# Patient Record
Sex: MALE | Race: WHITE | NOT HISPANIC OR LATINO | Employment: OTHER | URBAN - METROPOLITAN AREA
[De-identification: names, ages, dates, MRNs, and addresses within clinical notes are randomized per-mention and may not be internally consistent; named-entity substitution may affect disease eponyms.]

---

## 2017-01-03 ENCOUNTER — MYC MEDICAL ADVICE (OUTPATIENT)
Dept: OTOLARYNGOLOGY | Facility: CLINIC | Age: 57
End: 2017-01-03

## 2017-01-03 DIAGNOSIS — J32.9 CHRONIC SINUSITIS, UNSPECIFIED LOCATION: Primary | ICD-10-CM

## 2017-01-04 RX ORDER — CLINDAMYCIN HCL 150 MG
CAPSULE ORAL
Qty: 10 CAPSULE | Refills: 3 | Status: SHIPPED | OUTPATIENT
Start: 2017-01-04 | End: 2017-04-19

## 2017-01-06 ENCOUNTER — MYC MEDICAL ADVICE (OUTPATIENT)
Dept: FAMILY MEDICINE | Facility: CLINIC | Age: 57
End: 2017-01-06

## 2017-01-06 NOTE — TELEPHONE ENCOUNTER
Routing to PCP.    Dr. Wegener-Please review and may close encounter.    Thank you!  FLORIN AvalosN, RN

## 2017-01-23 ENCOUNTER — TELEPHONE (OUTPATIENT)
Dept: FAMILY MEDICINE | Facility: CLINIC | Age: 57
End: 2017-01-23

## 2017-01-23 ENCOUNTER — MYC MEDICAL ADVICE (OUTPATIENT)
Dept: FAMILY MEDICINE | Facility: CLINIC | Age: 57
End: 2017-01-23

## 2017-01-23 ENCOUNTER — OFFICE VISIT (OUTPATIENT)
Dept: FAMILY MEDICINE | Facility: CLINIC | Age: 57
End: 2017-01-23
Payer: MEDICARE

## 2017-01-23 VITALS
WEIGHT: 222 LBS | DIASTOLIC BLOOD PRESSURE: 78 MMHG | BODY MASS INDEX: 31.78 KG/M2 | TEMPERATURE: 98.7 F | OXYGEN SATURATION: 98 % | HEART RATE: 59 BPM | HEIGHT: 70 IN | RESPIRATION RATE: 12 BRPM | SYSTOLIC BLOOD PRESSURE: 159 MMHG

## 2017-01-23 DIAGNOSIS — F41.1 GENERALIZED ANXIETY DISORDER: ICD-10-CM

## 2017-01-23 DIAGNOSIS — M62.838 MUSCLE SPASMS OF BOTH LOWER EXTREMITIES: ICD-10-CM

## 2017-01-23 DIAGNOSIS — R11.0 NAUSEA WITHOUT VOMITING: ICD-10-CM

## 2017-01-23 DIAGNOSIS — Z79.4 TYPE 2 DIABETES MELLITUS WITHOUT COMPLICATION, WITH LONG-TERM CURRENT USE OF INSULIN (H): Primary | ICD-10-CM

## 2017-01-23 DIAGNOSIS — I10 HYPERTENSION GOAL BP (BLOOD PRESSURE) < 140/90: Primary | ICD-10-CM

## 2017-01-23 DIAGNOSIS — L89.92 PRESSURE ULCER, STAGE II (H): ICD-10-CM

## 2017-01-23 DIAGNOSIS — E11.9 TYPE 2 DIABETES MELLITUS WITHOUT COMPLICATION, WITH LONG-TERM CURRENT USE OF INSULIN (H): Primary | ICD-10-CM

## 2017-01-23 PROCEDURE — 99214 OFFICE O/P EST MOD 30 MIN: CPT | Performed by: FAMILY MEDICINE

## 2017-01-23 RX ORDER — SERTRALINE HYDROCHLORIDE 100 MG/1
200 TABLET, FILM COATED ORAL DAILY
Qty: 180 TABLET | Refills: 0 | Status: SHIPPED | OUTPATIENT
Start: 2017-01-23 | End: 2017-01-24

## 2017-01-23 RX ORDER — LORAZEPAM 0.5 MG/1
TABLET ORAL
Qty: 30 TABLET | Refills: 5 | Status: SHIPPED | OUTPATIENT
Start: 2017-01-23 | End: 2017-06-14

## 2017-01-23 RX ORDER — PROMETHAZINE HYDROCHLORIDE 25 MG/1
25 TABLET ORAL EVERY 6 HOURS PRN
Qty: 40 TABLET | Refills: 11 | Status: SHIPPED | OUTPATIENT
Start: 2017-01-23 | End: 2017-01-24

## 2017-01-23 NOTE — PATIENT INSTRUCTIONS
ASSESSMENT AND PLAN  1. Type 2 diabetes mellitus without complication, with long-term current use of insulin (H)  Decrease lantus to 28 units daily since a1c very low.       - insulin glargine (LANTUS SOLOSTAR) 100 UNIT/ML injection; Inject 28 units under the skin daily.  Dispense: 15 mL; Refill: 11    2. Muscle spasms of both lower extremities  Add lorazepam 1-2 tablets daily as needed to baclofen.     - LORazepam (ATIVAN) 0.5 MG tablet; 1-2 daily for muscle spasm as needed. #30/month  Dispense: 30 tablet; Refill: 5    3. Pressure ulcer, stage II  Referral given for additional suggestions.  At least stable and does not appear to be infected.     - WOUND CARE REFERRAL        MYCHART SIGNUP FOR E-VISITS AND EASIER COMMUNICATION:  [email or web address]     Zipnosis:  [email or web address]  Sign up for e-visits for common illnesses!     RADIOLOGY:   [facility]:  [phone] to schedule any radiology tests at [facility]: [phone]    Mammograms/colonoscopies:  [phone]    CONSUMER PRICE LINE for estimates of test costs:  [phone]

## 2017-01-23 NOTE — PROGRESS NOTES
SUBJECTIVE:                                                    Erwin Aguilar is a 56 year old male who presents to clinic today for the following health issues:      Rash/Pressure Ulcer      Duration: over a year and a half    Description  Location: edge of the butt crack/anus  Itching: no    Intensity:  Does not feel pain below the waist    Accompanying signs and symptoms: bleeding, sensitive     History (similar episodes/previous evaluation): None    Precipitating or alleviating factors:  New exposures:  None  Recent travel: no      Therapies tried and outcome: dawna honey, effective           Additional history/life update:     Pressure ulcer, stage II:  Located on right buttock near anus, still present. Not painful because patient lacks sensation below the waist 2/2 complication of back surgery in the past. He has noted bleeding and oozing from the ulcer. Cleans ulcer daily with alcohol or saline and applies Medihoney. Has not been able to use a foam donut to protect it due to the location of the ulcer.     Type 2 diabetes mellitus without complication, with long-term use of insulin (H):  Patient reports that blood glucose levels have dropped intermittently with these events being associated with fatigue. He would like to lower his dose of Lantus as his A1C is currently low.     Muscle spasms of both lower extremities: patient endorses worsening muscle spasms despite his large dose of baclofen. He is concerned that he is becoming desensitized to the baclofen and would like alternative treatments to address this. Muscles spasms occur randomly and have been waking him up at night which is significantly impacting his sleep.    Social History: Endorses stress with finances and recent changes in medical insurance. Is also experiencing stress due to medical illness in his family. He reports that he is trying to remain upbeat about these recent life experiences.       Problem list, Medication list, Allergies, and  Medical/Social/Surgical histories reviewed in [facility] and updated as appropriate.  Labs reviewed in EPIC  BP Readings from Last 3 Encounters:   01/23/17 159/78   12/19/16 132/70   10/10/16 130/70    Wt Readings from Last 3 Encounters:   01/23/17 222 lb (100.699 kg)   12/19/16 225 lb (102.059 kg)   10/10/16 226 lb (102.513 kg)                  Patient Active Problem List   Diagnosis     Generalized anxiety disorder     Type 2 diabetes, HbA1c goal < 7% (H)     Hyperlipidemia LDL goal <100     Hypertension goal BP (blood pressure) < 140/90     Major depressive disorder, recurrent episode, mild (H)     Chronic pain syndrome     GERD (gastroesophageal reflux disease)     Abnormal liver function tests     Health Care Home     Abnormal EMG     Hernia     Prolonged QT interval     Diastasis recti     Hypokalemia     Cauda equina syndrome with neurogenic bladder (H)     Wound infection     Cellulitis     Nausea without vomiting     Atopic dermatitis     Muscle spasms of Upper extremity     Edema of left lower extremity     Esophageal varices in cirrhosis (H)     Cirrhosis of liver (H)     Anemia due to blood loss, acute     Skin infection     Superficial thrombophlebitis of arm     Cellulitis of hand     Ganglion cyst     Dry skin dermatitis     Moderate persistent asthma     Open wound of right heel     Fall     Closed fracture of right patella     Right knee pain     Alcoholic cirrhosis of liver without ascites (H)     Lumbosacral radiculopathy     Calculus of gallbladder without cholecystitis without obstruction     Type 2 diabetes mellitus without complication, with long-term current use of insulin (H)     Past Surgical History   Procedure Laterality Date     Surgical history of -   1/02     abcess tooth     Surgical history of -   1999     L4-5 laminectomy, cauda equina syndrome     C appendectomy  1974     Hcl squamous cell carcinoma ag  10/07     left forearm     Surgical history of -   +     herniated disk repair      Neuro stimulator  2010     Herniorrhaphy umbilical  11/8/2012     Procedure: HERNIORRHAPHY UMBILICAL;  Open Umbilical Hernia Repair With Mesh ;  Surgeon: Chase Nicholson MD;  Location: [facility] OR     Endoscopy upper, colonoscopy, combined  10/19/2011     Procedure:COMBINED ENDOSCOPY UPPER, COLONOSCOPY; Upper Endoscopy, Colonoscopy with Polypectomy x4; Surgeon:AMBAR RODRÍGUEZ; Location:[facility] OR     Transposition ulnar nerve (elbow)       right     Esophagoscopy, gastroscopy, duodenoscopy (egd), combined  3/28/2014     Procedure: COMBINED ESOPHAGOSCOPY, GASTROSCOPY, DUODENOSCOPY (EGD);  EGD, Gastric Biopsies, Esophageal Banding;  Surgeon: Reyna Tovar MD;  Location: [facility] OR     Esophagoscopy, gastroscopy, duodenoscopy (egd), combined  6/9/2014     Procedure: COMBINED ESOPHAGOSCOPY, GASTROSCOPY, DUODENOSCOPY (EGD);  Surgeon: Curtis Mendez MD;  Location: [facility] GI     Esophagoscopy, gastroscopy, duodenoscopy (egd), combined  7/24/2014     Procedure: COMBINED ESOPHAGOSCOPY, GASTROSCOPY, DUODENOSCOPY (EGD);  Surgeon: Gerard Samaniego MD;  Location: [facility] OR     Esophagoscopy, gastroscopy, duodenoscopy (egd), combined N/A 10/31/2014     Procedure: COMBINED ESOPHAGOSCOPY, GASTROSCOPY, DUODENOSCOPY (EGD);  Surgeon: Gerard Samaniego MD;  Location: [facility] OR     Colonoscopy N/A 5/12/2016     Procedure: COMBINED COLONOSCOPY, SINGLE OR MULTIPLE BIOPSY/POLYPECTOMY BY BIOPSY;  Surgeon: Ana Paula Urbina MD;  Location: [facility] GI     Esophagoscopy, gastroscopy, duodenoscopy (egd), combined N/A 5/12/2016     Procedure: COMBINED ESOPHAGOSCOPY, GASTROSCOPY, DUODENOSCOPY (EGD);  Surgeon: Ana Paula Urbina MD;  Location: [facility] GI     Tonsillectomy  10 1964     Ent surgery  1-2016     Ongoing since then       Social History   Substance Use Topics     Smoking status: Former Smoker -- 0.50 packs/day for 1 years     Types: Cigarettes     Start date: 10/13/1983     Quit date: 06/09/1984     Smokeless tobacco: Never  Used     Alcohol Use: No      Comment: a pint of alcohol / day - last drink was 3/28/14     Family History   Problem Relation Age of Onset     DIABETES Brother      amputation, Type 1     Cancer - colorectal No family hx of      CANCER Mother      lung     CANCER Father      esophogeal, GERD     DIABETES Brother          Current Outpatient Prescriptions   Medication Sig Dispense Refill     LORazepam (ATIVAN) 0.5 MG tablet 1-2 daily for muscle spasm as needed. #30/month 30 tablet 5     insulin glargine (LANTUS SOLOSTAR) 100 UNIT/ML injection Inject 28 units under the skin daily. 15 mL 11     clindamycin (CLEOCIN) 150 MG capsule Open 1 cap and place in 1 litre of normal saline & mix. Irrigate with 20cc each nostril QID. 10 capsule 3     hydrochlorothiazide (HYDRODIURIL) 25 MG tablet Take 1 tablet (25 mg) by mouth daily 30 tablet 0     order for DME Equipment being ordered: one donut for sitting 1 Device 0     magnesium oxide (MAG-OX) 400 (241.3 MG) MG tablet Take 1 tablet (400 mg) by mouth daily 90 tablet 3     promethazine (PHENERGAN) 25 MG tablet Take 1 tablet (25 mg) by mouth every 6 hours as needed for nausea 40 tablet 3     clindamycin (CLEOCIN) 150 MG capsule TK 1 C PO QID TILL GONE  0     sodium chloride 0.9%, bottle, 0.9 % irrigation IRRIGATE 20 ML  IN EACH NOSTRIL QID  3     clindamycin (CLEOCIN) 150 MG capsule Open one capsule and place in 1 Litre of Normal Saline and mix. Irrigate with 20 cc each nostril QID. 10 capsule 3     sodium chloride 0.9 % SOLN Irrigate 20 cc each nostril QID X 2 month supply 50595 mL 3     propranolol (INDERAL) 20 MG tablet Take 1 tablet (20 mg) by mouth 2 times daily 180 tablet 3     blood glucose monitoring (NO BRAND SPECIFIED) test strip Use to test blood sugars one time daily or as directed    Accucheck Richards plus test strips 1 Box 5     albuterol (PROAIR HFA, PROVENTIL HFA, VENTOLIN HFA) 108 (90 BASE) MCG/ACT inhaler Inhale 2 puffs into the lungs every 6 hours as needed for  shortness of breath / dyspnea or wheezing Ventolin or other covered brand 3 Inhaler 3     insulin pen needle (BD ROSE U/F) 32G X 4 MM Use one daily as directed.    Bd ultra fine pen needles rose 90 each 3     Syringe, Disposable, 25 ML MISC Syringe to be used for nasal irrigation 1 each 3     sodium chloride 0.9 % SOLN Irrigate 20 cc each nostril QID X 2 month supply 26555 mL 3     baclofen (LIORESAL) 10 MG tablet Take 2 tablets (20 mg) by mouth 3 times daily 180 tablet 12     potassium chloride SA (POTASSIUM CHLORIDE) 20 MEQ tablet Take 3 tablets (60 mEq) by mouth daily 180 tablet 3     omeprazole (PRILOSEC) 40 MG capsule Take 1 capsule (40 mg) by mouth daily Take 30-60 minutes before a meal. 90 capsule 3     sertraline (ZOLOFT) 100 MG tablet Take 2 tablets (200 mg) by mouth daily 180 tablet 3     Alcohol Swabs (ALCOHOL PADS) 70 % PADS 1 Box 4 times daily 100 each 3     triamcinolone (KENALOG) 0.1 % ointment Apply sparingly to affected area three times daily for 14 days. 30 g 3     COMPRESSION STOCKINGS Knee high compression socks 30-40-mm 2 each 1     blood glucose monitoring (FREESTYLE) lancets Use to test blood sugars 2 times daily or as directed. 1 Box 3     Blood Glucose Monitoring Suppl (ACURA BLOOD GLUCOSE METER) W/DEVICE KIT 1 Dose 2 times daily 1 kit 1     ORDER FOR DME Equipment being ordered: BP cuff 1 Device 0     ACE/ARB NOT PRESCRIBED, INTENTIONAL, ACE & ARB not prescribed due to Allergy       [DISCONTINUED] insulin glargine (LANTUS SOLOSTAR) 100 UNIT/ML PEN Inject 32 units under the skin daily. 15 mL 11     Allergies   Allergen Reactions     Lisinopril Anaphylaxis     Swollen tongue; inability to swallow; drooling; hives; swollen face, neck, angioedema     Acetaminophen      Hx of cirrhosis      Amlodipine      Swelling, hives, possible angioedema       Morphine      Hypotension     Bactrim [Sulfamethoxazole W/Trimethoprim] Rash     Levaquin Swelling and Rash     Swelling in lip and tongue felt thick  "    Recent Labs   Lab Test  10/10/16   0907  10/10/16   0800  08/23/16   0652   05/10/16   0843   12/11/15   0854   10/05/15   0923  08/04/15   0750   07/03/15   0910   02/06/15   0955   04/26/13   1127   A1C  5.5   --    --    --   6.4*   --    --    --    --   5.7   --    --    --   5.8   < >   --    LDL   --    --    --    --    --    --   123*   --    --    --    --   166*   --   160*   < >   --    HDL   --    --    --    --    --    --   42   --    --    --    --   33*   --   35*   < >   --    TRIG   --    --    --    --    --    --   74   --    --    --    --   105   --   98   < >   --    ALT   --   20  20   --   30   < >  28   < >  24   --    < >  27   < >   --    < >   --    CR  0.75  0.76  0.76   < >  0.67   < >   --    < >  0.78   --    < >  0.67   < >   --    < >   --    GFRESTIMATED  >90  Non  GFR Calc    >90  Non  GFR Calc    >90  Non  GFR Calc     < >  >90  Non  GFR Calc     < >   --    < >  >90  Non  GFR Calc     --    < >  >90  Non  GFR Calc     < >   --    < >   --    GFRESTBLACK  >90   GFR Calc    >90   GFR Calc    >90   GFR Calc     < >  >90   GFR Calc     < >   --    < >  >90   GFR Calc     --    < >  >90   GFR Calc     < >   --    < >   --    POTASSIUM  3.5  3.5  3.1*   < >  3.2*   < >  3.9   < >  3.3*   --    < >  3.2*   < >   --    < >   --    TSH   --    --    --    --    --    --    --    --   1.08   --    --    --    --    --    --   0.69    < > = values in this interval not displayed.        REVIEW OF SYSTEMS FOR GENERAL, RESPIRATORY, CV, GI AND PSYCHIATRIC NEGATIVE EXCEPT AS NOTED IN HPI.         OBJECTIVE:  /78 mmHg  Pulse 59  Temp(Src) 98.7  F (37.1  C) (Oral)  Resp 12  Ht 5' 10\" (1.778 m)  Wt 222 lb (100.699 kg)  BMI 31.85 kg/m2  SpO2 98%    EXAM:  GENERAL APPEARANCE: overweight " gentleman, alert and no distress, talkative and pleasant  RESP: lungs clear to auscultation - No acute breathing difficulty or wheezing noted.  CV: extremities well perfused. No edema noted.   SKIN:  Stage II ulcer present on the right buttock in the intergluteal cleft. Measures about 2x3cm. Well-cleaned with no signs of acute infection. Stable from previously.       ASSESSMENT AND PLAN  Patient Instructions   ASSESSMENT AND PLAN  1. Type 2 diabetes mellitus without complication, with long-term current use of insulin (H)  Decrease lantus from 32 to 28 units daily since a1c very low.       - insulin glargine (LANTUS SOLOSTAR) 100 UNIT/ML injection; Inject 28 units under the skin daily.  Dispense: 15 mL; Refill: 11    2. Muscle spasms of both lower extremities  Add lorazepam 1-2 tablets daily as needed to baclofen.     - LORazepam (ATIVAN) 0.5 MG tablet; 1-2 daily for muscle spasm as needed. #30/month  Dispense: 30 tablet; Refill: 5    3. Pressure ulcer, stage II  Referral given for additional suggestions.  At least stable and does not appear to be infected.     - WOUND CARE REFERRAL        MYCHART SIGNUP FOR E-VISITS AND EASIER COMMUNICATION:  [email or web address]     Zipnosis:  [email or web address].  Sign up for e-visits for common illnesses!     RADIOLOGY:   [facility]:  [phone] to schedule any radiology tests at [facility]: [phone]    Mammograms/colonoscopies:  [phone]    CONSUMER PRICE LINE for estimates of test costs:  [phone]                Scribed by Myron Meyers, Medical Student for Joel Wegener, MD based on the provider s statements to me.        I have reviewed and edited the medical student s documentation acting as scribe and verify that the history, exam and medical decision making reflect the history, exam and decision making performed by myself today.      Joel Wegener,MD

## 2017-01-23 NOTE — TELEPHONE ENCOUNTER
Sent in leftover refill for zoloft.  Sent in this refill for anxiety.  RODERICK Gu       Last PHQ-9 score on record=   PHQ-9 SCORE 10/10/2016   Total Score -   Total Score 1                   hctz     bp is high. Routing to pcp.  RODERICK Gu    Last Written Prescription Date: 12/28/16  Last Fill Quantity: 30, # refills: 0  Last Office Visit with [facility], [facility] or [facility] prescribing provider: 1/23/17       POTASSIUM   Date Value Ref Range Status   10/10/2016 3.5 3.4 - 5.3 mmol/L Final     CREATININE   Date Value Ref Range Status   10/10/2016 0.75 0.66 - 1.25 mg/dL Final     BP Readings from Last 3 Encounters:   01/23/17 159/78   12/19/16 132/70   10/10/16 130/70     phenergen    Prescription approved per [facility] Refill Protocol.  RODERICK Gu    Last Written Prescription Date: 10/20/16  Last Fill Quantity: 40,  # refills: 3   Last Office Visit with [facility], [facility] or [facility] prescribing provider: 1/23/17

## 2017-01-23 NOTE — TELEPHONE ENCOUNTER
Reason for Call:  Medication or medication refill:    Do you use a [facility]?  Name of the pharmacy and phone number for the current request: [facility] at [address] [place] [address]    Name of the medication requested: Zoloft, hydrochlorothiazide (HYDRODIURIL) 25 MG tablet and  promethazine (PHENERGAN) 25 MG tablet .    Other request:    Can we leave a detailed message on this number? YES    Phone number patient can be reached at: Home number on file [phone] (home)    Best Time: any time    Call taken on 1/23/2017 at 5:18 PM by Christine Becerril

## 2017-01-23 NOTE — NURSING NOTE
"Chief Complaint   Patient presents with     Ulcer     follow up       Initial /78 mmHg  Pulse 59  Temp(Src) 98.7  F (37.1  C) (Oral)  Resp 12  Ht 5' 10\" (1.778 m)  Wt 222 lb (100.699 kg)  BMI 31.85 kg/m2  SpO2 98% Estimated body mass index is 31.85 kg/(m^2) as calculated from the following:    Height as of this encounter: 5' 10\" (1.778 m).    Weight as of this encounter: 222 lb (100.699 kg).  BP completed using cuff size: SMA Perla    "

## 2017-01-23 NOTE — MR AVS SNAPSHOT
After Visit Summary   1/23/2017    Erwin Aguilar    MRN: [id number]           Patient Information     Date Of Birth          1960        Visit Information        Provider Department      1/23/2017 2:00 PM Wegener, Joel Daniel Irwin, MD [facility]        Today's Diagnoses     Type 2 diabetes mellitus without complication, with long-term current use of insulin (H)    -  1     Muscle spasms of both lower extremities         Pressure ulcer, stage II           Care Instructions    ASSESSMENT AND PLAN  1. Type 2 diabetes mellitus without complication, with long-term current use of insulin (H)  Decrease lantus to 28 units daily since a1c very low.       - insulin glargine (LANTUS SOLOSTAR) 100 UNIT/ML injection; Inject 28 units under the skin daily.  Dispense: 15 mL; Refill: 11    2. Muscle spasms of both lower extremities  Add lorazepam 1-2 tablets daily as needed to baclofen.     - LORazepam (ATIVAN) 0.5 MG tablet; 1-2 daily for muscle spasm as needed. #30/month  Dispense: 30 tablet; Refill: 5    3. Pressure ulcer, stage II  Referral given for additional suggestions.  At least stable and does not appear to be infected.     - WOUND CARE REFERRAL        MYCHART SIGNUP FOR E-VISITS AND EASIER COMMUNICATION:  [email or web address]     Zipnosis:  [email or web address]  Sign up for e-visits for common illnesses!     RADIOLOGY:   [facility]:  [phone] to schedule any radiology tests at [facility]: [phone]    Mammograms/colonoscopies:  [phone]    CONSUMER PRICE LINE for estimates of test costs:  [phone]               Follow-ups after your visit        Additional Services     WOUND CARE REFERRAL       Your provider has referred you to: [facility] Wound Ostomy - [place] [phone]   [email or web address]    Reason for referral: Wound care      1. WOC Wound Consult appointment is related to  what kind of wound: Pressure Ulcer    2. Location of wound: Buttocks    3. Reason for referral: Assess and treat as indicated    4. Desired treatment if any: Per [facility] nurse     Please be aware that coverage of these services is subject to the terms and limitations of your health insurance plan.  Call member services at your health plan with any benefit or coverage questions.      Please bring the following with you to your appointment:    (1) Any X-Rays, CTs or MRIs which have been performed.  Contact the facility where they were done to arrange for  prior to your scheduled appointment.    (2) List of current medications   (3) This referral request   (4) Any documents/labs given to you for this referral                  Your next 10 appointments already scheduled     Feb 02, 2017  7:45 AM   (Arrive by 7:30 AM)   Return Visit with Antonio Chávez MD   [facility] Ear Nose and Throat ([facility])    [address] [place] [address]  4th [facility] [id number] [phone]            Apr 11, 2017  8:20 AM   (Arrive by 8:05 AM)   Return General Liver with Kimberly Ramirez MD   [facility] Hepatology ([facility])    [address] [place] [id number] [phone]              Who to contact     If you have questions or need follow up information about today's clinic visit or your schedule please contact [facility] directly at [phone].  Normal or non-critical lab and imaging results will be communicated to you by MyChart, letter or phone within 4 business days after the clinic has received the results. If you do not hear from us within 7 days, please contact the clinic through MyChart or phone. If you have a critical or abnormal lab result, we will notify you by phone as soon as possible.  Submit refill requests through [employer] or call your pharmacy and they will forward the refill request to us. Please  "allow 3 business days for your refill to be completed.          Additional Information About Your Visit        [employer] Information     [employer] gives you secure access to your electronic health record. If you see a primary care provider, you can also send messages to your care team and make appointments. If you have questions, please call your primary care clinic.  If you do not have a primary care provider, please call [phone] and they will assist you.        Care EveryWhere ID     This is your Care EveryWhere ID. This could be used by other organizations to access your [place] medical records  [id number]        Your Vitals Were     Pulse Temperature Respirations Height BMI (Body Mass Index) Pulse Oximetry    59 98.7  F (37.1  C) (Oral) 12 5' 10\" (1.778 m) 31.85 kg/m2 98%       Blood Pressure from Last 3 Encounters:   01/23/17 159/78   12/19/16 132/70   10/10/16 130/70    Weight from Last 3 Encounters:   01/23/17 222 lb (100.699 kg)   12/19/16 225 lb (102.059 kg)   10/10/16 226 lb (102.513 kg)              We Performed the Following     WOUND CARE REFERRAL          Today's Medication Changes          These changes are accurate as of: 1/23/17  3:18 PM.  If you have any questions, ask your nurse or doctor.               Start taking these medicines.        Dose/Directions    LORazepam 0.5 MG tablet   Commonly known as:  ATIVAN   Used for:  Muscle spasms of both lower extremities   Started by:  Wegener, Joel Daniel Irwin, MD        1-2 daily for muscle spasm as needed. #30/month   Quantity:  30 tablet   Refills:  5         These medicines have changed or have updated prescriptions.        Dose/Directions    LANTUS SOLOSTAR 100 UNIT/ML injection   This may have changed:  additional instructions   Used for:  Type 2 diabetes mellitus without complication, with long-term current use of insulin (H)   Generic drug:  insulin glargine   Changed by:  Wegener, Joel Daniel Irwin, MD        Inject 28 units under the skin " daily.   Quantity:  15 mL   Refills:  11            Where to get your medicines      Some of these will need a paper prescription and others can be bought over the counter.  Ask your nurse if you have questions.     Bring a paper prescription for each of these medications    - LORazepam 0.5 MG tablet             Primary Care Provider Office Phone # Fax #    Joel Daniel Irwin Wegener, [phone]       [facility] [address] [place] [address]        Thank you!     Thank you for choosing [facility]  for your care. Our goal is always to provide you with excellent care. Hearing back from our patients is one way we can continue to improve our services. Please take a few minutes to complete the written survey that you may receive in the mail after your visit with us. Thank you!             Your Updated Medication List - Protect others around you: Learn how to safely use, store and throw away your medicines at www.disposemymeds.org.          This list is accurate as of: 1/23/17  3:18 PM.  Always use your most recent med list.                   Brand Name Dispense Instructions for use    * ACE/ARB NOT PRESCRIBED (INTENTIONAL)      ACE & ARB not prescribed due to Allergy       ACURA BLOOD GLUCOSE METER W/DEVICE Kit     1 kit    1 Dose 2 times daily       albuterol 108 (90 BASE) MCG/ACT Inhaler    PROAIR HFA/PROVENTIL HFA/VENTOLIN HFA    3 Inhaler    Inhale 2 puffs into the lungs every 6 hours as needed for shortness of breath / dyspnea or wheezing Ventolin or other covered brand       Alcohol Pads 70 % Pads     100 each    1 Box 4 times daily       baclofen 10 MG tablet    LIORESAL    180 tablet    Take 2 tablets (20 mg) by mouth 3 times daily       blood glucose monitoring lancets     1 Box    Use to test blood sugars 2 times daily or as directed.       blood glucose monitoring test strip    no brand specified    1 Box    Use to test blood sugars one time daily or as directed   Accucheck Richards plus test strips       * clindamycin 150 MG capsule    CLEOCIN     TK 1 C PO QID TILL GONE       * clindamycin 150 MG capsule    CLEOCIN    10 capsule    Open one capsule and place in 1 Litre of Normal Saline and mix. Irrigate with 20 cc each nostril QID.       * clindamycin 150 MG capsule    CLEOCIN    10 capsule    Open 1 cap and place in 1 litre of normal saline & mix. Irrigate with 20cc each nostril QID.       COMPRESSION STOCKINGS     2 each    Knee high compression socks 30-40-mm       hydrochlorothiazide 25 MG tablet    HYDRODIURIL    30 tablet    Take 1 tablet (25 mg) by mouth daily       insulin pen needle 32G X 4 MM    BD ROSE U/F    90 each    Use one daily as directed.  Bd ultra fine pen needles rose       LANTUS SOLOSTAR 100 UNIT/ML injection   Generic drug:  insulin glargine     15 mL    Inject 28 units under the skin daily.       LORazepam 0.5 MG tablet    ATIVAN    30 tablet    1-2 daily for muscle spasm as needed. #30/month       magnesium oxide 400 (241.3 MG) MG tablet    MAG-OX    90 tablet    Take 1 tablet (400 mg) by mouth daily       omeprazole 40 MG capsule    priLOSEC    90 capsule    Take 1 capsule (40 mg) by mouth daily Take 30-60 minutes before a meal.       * order for DME     1 Device    Equipment being ordered: BP cuff       order for DME     1 Device    Equipment being ordered: one donut for sitting       potassium chloride SA 20 MEQ CR tablet    potassium chloride    180 tablet    Take 3 tablets (60 mEq) by mouth daily       promethazine 25 MG tablet    PHENERGAN    40 tablet    Take 1 tablet (25 mg) by mouth every 6 hours as needed for nausea       propranolol 20 MG tablet    INDERAL    180 tablet    Take 1 tablet (20 mg) by mouth 2 times daily       sertraline 100 MG tablet    ZOLOFT    180 tablet    Take 2 tablets (200 mg) by mouth daily       * sodium chloride 0.9 % Soln     28636 mL    Irrigate 20 cc each nostril QID X 2 month supply       * sodium chloride 0.9 %  Soln     98706 mL    Irrigate 20 cc each nostril QID X 2 month supply       sodium chloride 0.9% (bottle) 0.9 % irrigation      IRRIGATE 20 ML  IN EACH NOSTRIL QID       Syringe (Disposable) 25 ML Misc     1 each    Syringe to be used for nasal irrigation       triamcinolone 0.1 % ointment    KENALOG    30 g    Apply sparingly to affected area three times daily for 14 days.       * Notice:  This list has 7 medication(s) that are the same as other medications prescribed for you. Read the directions carefully, and ask your doctor or other care provider to review them with you.

## 2017-01-24 RX ORDER — HYDROCHLOROTHIAZIDE 25 MG/1
25 TABLET ORAL DAILY
Qty: 90 TABLET | Refills: 3 | Status: SHIPPED | OUTPATIENT
Start: 2017-01-24 | End: 2017-10-03

## 2017-01-24 RX ORDER — PROMETHAZINE HYDROCHLORIDE 25 MG/1
25 TABLET ORAL EVERY 6 HOURS PRN
Qty: 40 TABLET | Refills: 11 | Status: SHIPPED | OUTPATIENT
Start: 2017-01-24 | End: 2018-01-29

## 2017-01-24 RX ORDER — SERTRALINE HYDROCHLORIDE 100 MG/1
200 TABLET, FILM COATED ORAL DAILY
Qty: 180 TABLET | Refills: 3 | Status: SHIPPED | OUTPATIENT
Start: 2017-01-24 | End: 2017-03-27

## 2017-02-16 ENCOUNTER — OFFICE VISIT (OUTPATIENT)
Dept: OTOLARYNGOLOGY | Facility: CLINIC | Age: 57
End: 2017-02-16

## 2017-02-16 VITALS — WEIGHT: 223 LBS | BODY MASS INDEX: 31.92 KG/M2 | HEIGHT: 70 IN

## 2017-02-16 DIAGNOSIS — J34.89 NASAL LESION: Primary | ICD-10-CM

## 2017-02-16 DIAGNOSIS — L03.211 FACIAL CELLULITIS: ICD-10-CM

## 2017-02-16 DIAGNOSIS — J34.89 NASAL VESTIBULITIS: ICD-10-CM

## 2017-02-16 PROCEDURE — 87186 SC STD MICRODIL/AGAR DIL: CPT | Performed by: OTOLARYNGOLOGY

## 2017-02-16 PROCEDURE — 87077 CULTURE AEROBIC IDENTIFY: CPT | Performed by: OTOLARYNGOLOGY

## 2017-02-16 PROCEDURE — 87070 CULTURE OTHR SPECIMN AEROBIC: CPT | Performed by: OTOLARYNGOLOGY

## 2017-02-16 RX ORDER — MUPIROCIN 20 MG/G
OINTMENT TOPICAL
Qty: 2 G | Refills: 3 | Status: SHIPPED | OUTPATIENT
Start: 2017-02-16 | End: 2020-11-09

## 2017-02-16 ASSESSMENT — PAIN SCALES - GENERAL: PAINLEVEL: NO PAIN (0)

## 2017-02-16 NOTE — PATIENT INSTRUCTIONS
1.  Right nostril culture taken.   2.  Patient to begin Augmentin & Bactroban.  Use as directed.  3.  Patient to follow up in the ENT clinic in 1 week.  4.  Patient to call our office sooner with questions or concerns: [phone], option #3.     The patient presents with a history of chronic sinusitis, oral infections and abscesses and events of cellulitis elsewhere on the body. His sinuses have been doing well with the use of Cleocin Nasal Rinses, but the patient is having difficulty since yesterday with a right nasal vestibulitis and a right facial skin infection.

## 2017-02-16 NOTE — MR AVS SNAPSHOT
After Visit Summary   2/16/2017    Erwin Aguilar    MRN: [id number]           Patient Information     Date Of Birth          1960        Visit Information        Provider Department      2/16/2017 8:45 AM Antonio Chávez MD [facility] Ear Nose and Throat        Today's Diagnoses     Nasal lesion    -  1    Nasal vestibulitis        Facial cellulitis          Care Instructions    1.  Right nostril culture taken.   2.  Patient to begin Augmentin & Bactroban.  Use as directed.  3.  Patient to follow up in the ENT clinic in 1 week.  4.  Patient to call our office sooner with questions or concerns: [phone], option #3.     The patient presents with a history of chronic sinusitis, oral infections and abscesses and events of cellulitis elsewhere on the body. His sinuses have been doing well with the use of Cleocin Nasal Rinses, but the patient is having difficulty since yesterday with a right nasal vestibulitis and a right facial skin infection.         Follow-ups after your visit        Your next 10 appointments already scheduled     Feb 23, 2017  8:45 AM CST   (Arrive by 8:30 AM)   Return Visit with Antonio Chávez MD   [facility] Ear Nose and Throat ([facility])    [address] [address] [phone]            Feb 23, 2017  9:30 AM CST   NEW OSTOMY NURSE VISIT with Breanna Sahni RN   [facility] Wound Ostomy ([facility])    [address] [address] [phone]            Apr 11, 2017  8:20 AM CDT   (Arrive by 8:05 AM)   Return General Liver with Kimberly Ramirez MD   [facility] Hepatology ([facility])    [address] [address] [phone]              Who to contact     Please call your clinic at [phone] to:    Ask questions about your health    Make or cancel  "appointments    Discuss your medicines    Learn about your test results    Speak to your doctor   If you have compliments or concerns about an experience at your clinic, or if you wish to file a complaint, please contact [facility] Patient Relations at [phone] or email us at [email or web address]         Additional Information About Your Visit        [employer] Information     [employer] gives you secure access to your electronic health record. If you see a primary care provider, you can also send messages to your care team and make appointments. If you have questions, please call your primary care clinic.  If you do not have a primary care provider, please call [phone] and they will assist you.      [employer] is an electronic gateway that provides easy, online access to your medical records. With [employer], you can request a clinic appointment, read your test results, renew a prescription or communicate with your care team.     To access your existing account, please contact your [facility] or call [phone] for assistance.        Care EveryWhere ID     This is your Care EveryWhere ID. This could be used by other organizations to access your [place] medical records  [id number]        Your Vitals Were     Height BMI (Body Mass Index)                1.77 m (5' 9.69\") 32.29 kg/m2           Blood Pressure from Last 3 Encounters:   01/23/17 159/78   12/19/16 132/70   10/10/16 130/70    Weight from Last 3 Encounters:   02/16/17 101.2 kg (223 lb)   01/23/17 100.7 kg (222 lb)   12/19/16 102.1 kg (225 lb)              We Performed the Following     Nose Culture Aerobic Bacterial     Nose Culture Aerobic Bacterial          Today's Medication Changes          These changes are accurate as of: 2/16/17  9:13 AM.  If you have any questions, ask your nurse or doctor.               Start taking these medicines.        Dose/Directions    " amoxicillin-clavulanate 875-125 MG per tablet   Commonly known as:  AUGMENTIN   Used for:  Nasal lesion, Nasal vestibulitis, Facial cellulitis   Started by:  Antonio Chávez MD        Dose:  1 tablet   Take 1 tablet by mouth 2 times daily for 10 days   Quantity:  20 tablet   Refills:  0       mupirocin 2 % ointment   Commonly known as:  BACTROBAN   Used for:  Nasal lesion, Nasal vestibulitis   Started by:  Antonio Chávez MD        Apply to anterior nares BID X 2 month supply   Quantity:  2 g   Refills:  3            Where to get your medicines      These medications were sent to [facility]/pharmacy #[id number] - [place], [place] - [address]  [address], [place] [address]     Phone:  [phone]     amoxicillin-clavulanate 875-125 MG per tablet    mupirocin 2 % ointment                Primary Care Provider Office Phone # Fax #    Joel Daniel Irwin Wegener, [phone]       [facility] [address] [place] [address]        Thank you!     Thank you for choosing [facility] EAR NOSE AND THROAT  for your care. Our goal is always to provide you with excellent care. Hearing back from our patients is one way we can continue to improve our services. Please take a few minutes to complete the written survey that you may receive in the mail after your visit with us. Thank you!             Your Updated Medication List - Protect others around you: Learn how to safely use, store and throw away your medicines at www.disposemymeds.org.          This list is accurate as of: 2/16/17  9:13 AM.  Always use your most recent med list.                   Brand Name Dispense Instructions for use    * ACE/ARB NOT PRESCRIBED (INTENTIONAL)      ACE & ARB not prescribed due to Allergy       ACURA BLOOD GLUCOSE METER W/DEVICE Kit     1 kit    1 Dose 2 times daily       albuterol 108 (90 BASE) MCG/ACT Inhaler    PROAIR HFA/PROVENTIL HFA/VENTOLIN HFA    3 Inhaler    Inhale 2 puffs into the lungs  every 6 hours as needed for shortness of breath / dyspnea or wheezing Ventolin or other covered brand       Alcohol Pads 70 % Pads     100 each    1 Box 4 times daily       amoxicillin-clavulanate 875-125 MG per tablet    AUGMENTIN    20 tablet    Take 1 tablet by mouth 2 times daily for 10 days       baclofen 10 MG tablet    LIORESAL    180 tablet    Take 2 tablets (20 mg) by mouth 3 times daily       blood glucose monitoring lancets     1 Box    Use to test blood sugars 2 times daily or as directed.       blood glucose monitoring test strip    no brand specified    1 Box    Use to test blood sugars one time daily or as directed  Accucheck Richards plus test strips       * clindamycin 150 MG capsule    CLEOCIN     TK 1 C PO QID TILL GONE       * clindamycin 150 MG capsule    CLEOCIN    10 capsule    Open one capsule and place in 1 Litre of Normal Saline and mix. Irrigate with 20 cc each nostril QID.       * clindamycin 150 MG capsule    CLEOCIN    10 capsule    Open 1 cap and place in 1 litre of normal saline & mix. Irrigate with 20cc each nostril QID.       COMPRESSION STOCKINGS     2 each    Knee high compression socks 30-40-mm       hydrochlorothiazide 25 MG tablet    HYDRODIURIL    90 tablet    Take 1 tablet (25 mg) by mouth daily       insulin pen needle 32G X 4 MM    BD ROSE U/F    90 each    Use one daily as directed.  Bd ultra fine pen needles rose       LANTUS SOLOSTAR 100 UNIT/ML injection   Generic drug:  insulin glargine     15 mL    Inject 28 units under the skin daily.       LORazepam 0.5 MG tablet    ATIVAN    30 tablet    1-2 daily for muscle spasm as needed. #30/month       magnesium oxide 400 (241.3 MG) MG tablet    MAG-OX    90 tablet    Take 1 tablet (400 mg) by mouth daily       mupirocin 2 % ointment    BACTROBAN    2 g    Apply to anterior nares BID X 2 month supply       omeprazole 40 MG capsule    priLOSEC    90 capsule    Take 1 capsule (40 mg) by mouth daily Take 30-60 minutes before a meal.        * order for DME     1 Device    Equipment being ordered: BP cuff       order for DME     1 Device    Equipment being ordered: one donut for sitting       potassium chloride SA 20 MEQ CR tablet    potassium chloride    180 tablet    Take 3 tablets (60 mEq) by mouth daily       promethazine 25 MG tablet    PHENERGAN    40 tablet    Take 1 tablet (25 mg) by mouth every 6 hours as needed for nausea       propranolol 20 MG tablet    INDERAL    180 tablet    Take 1 tablet (20 mg) by mouth 2 times daily       sertraline 100 MG tablet    ZOLOFT    180 tablet    Take 2 tablets (200 mg) by mouth daily       * sodium chloride 0.9 % Soln     87644 mL    Irrigate 20 cc each nostril QID X 2 month supply       * sodium chloride 0.9 % Soln     09109 mL    Irrigate 20 cc each nostril QID X 2 month supply       sodium chloride 0.9% (bottle) 0.9 % irrigation      IRRIGATE 20 ML  IN EACH NOSTRIL QID       Syringe (Disposable) 25 ML Misc     1 each    Syringe to be used for nasal irrigation       triamcinolone 0.1 % ointment    KENALOG    30 g    Apply sparingly to affected area three times daily for 14 days.       * Notice:  This list has 7 medication(s) that are the same as other medications prescribed for you. Read the directions carefully, and ask your doctor or other care provider to review them with you.

## 2017-02-16 NOTE — PROGRESS NOTES
The patient presents with a history of chronic sinusitis and recurrent staph infections of the nose, mouth and elsewhere on the body. The patient reports significant improvement of his nasal symptoms with the use of Cleocin Nasal Rinses. However, yesterday, he developed a lesion on the right nasal vestibule and another on the right malar area.       All other systems were reviewed and they are either negative or they are not directly pertinent to this Otolaryngology examination.      Past Medical History:    Past Medical History   Diagnosis Date     Actinic keratosis      aldara     Anxiety      Arrhythmia      prolonged QT     Asthma      Bleeding disorder (H) 3-27-16     Cancer (H)      squamous cell skin CA     Cauda equina spinal cord injury (H)      Chronic pain      right leg     Chronic pain syndrome      Chronic sinusitis 5-1-16     Diastasis recti      Esophageal reflux      Esophageal varices in cirrhosis (H) 4/1/2014     Hospitalized for UGI blee 3/28/14, endoscopy revealed bleeding varices.     Essential hypertension, benign      Generalized anxiety disorder      H/O dental abscess      Hernia      Liver disease 3-     MEDICAL HISTORY OF -      right leg numbness due to back injury     MEDICAL HISTORY OF -      alcoholism     MEDICAL HISTORY OF -      squamous cell     Mild depression      Mixed hyperlipidemia      Nasal polyps 5-1-16     Other chronic pain      Seasonal allergic conjunctivitis      Type II or unspecified type diabetes mellitus without mention of complication, not stated as uncontrolled      Ulcer (H)      R foot     Umbilical hernia without mention of obstruction or gangrene 10/2012     Unspecified site of spinal cord injury without evidence of spinal bone injury        Past Surgical History:    Past Surgical History   Procedure Laterality Date     Surgical history of -   1/02     abcess tooth     Surgical history of -   1999     L4-5 laminectomy, cauda equina syndrome      C appendectomy  1974     Hcl squamous cell carcinoma ag  10/07     left forearm     Surgical history of -   +     herniated disk repair     Neuro stimulator  2010     Herniorrhaphy umbilical  11/8/2012     Procedure: HERNIORRHAPHY UMBILICAL;  Open Umbilical Hernia Repair With Mesh ;  Surgeon: Chase Nicholson MD;  [facility]     Endoscopy upper, colonoscopy, combined  10/19/2011     Procedure:COMBINED ENDOSCOPY UPPER, COLONOSCOPY; Upper Endoscopy, Colonoscopy with Polypectomy x4; Surgeon:AMBAR RODRÍGUEZIQ; [facility]     Transposition ulnar nerve (elbow)       right     Esophagoscopy, gastroscopy, duodenoscopy (egd), combined  3/28/2014     Procedure: COMBINED ESOPHAGOSCOPY, GASTROSCOPY, DUODENOSCOPY (EGD);  EGD, Gastric Biopsies, Esophageal Banding;  Surgeon: Reyna Tovar MD;  [facility]     Esophagoscopy, gastroscopy, duodenoscopy (egd), combined  6/9/2014     Procedure: COMBINED ESOPHAGOSCOPY, GASTROSCOPY, DUODENOSCOPY (EGD);  Surgeon: Curtis Mendez MD;  [facility]     Esophagoscopy, gastroscopy, duodenoscopy (egd), combined  7/24/2014     Procedure: COMBINED ESOPHAGOSCOPY, GASTROSCOPY, DUODENOSCOPY (EGD);  Surgeon: Gerard Samaniego MD;  [facility]     Esophagoscopy, gastroscopy, duodenoscopy (egd), combined N/A 10/31/2014     Procedure: COMBINED ESOPHAGOSCOPY, GASTROSCOPY, DUODENOSCOPY (EGD);  Surgeon: Gerard Samaniego MD;  [facility]     Colonoscopy N/A 5/12/2016     Procedure: COMBINED COLONOSCOPY, SINGLE OR MULTIPLE BIOPSY/POLYPECTOMY BY BIOPSY;  Surgeon: Ana Paula Urbina MD;  [facility]     Esophagoscopy, gastroscopy, duodenoscopy (egd), combined N/A 5/12/2016     Procedure: COMBINED ESOPHAGOSCOPY, GASTROSCOPY, DUODENOSCOPY (EGD);  Surgeon: Ana Paula Urbina MD;  [facility]     Tonsillectomy  10 1964     Ent surgery  1-2016     Ongoing since then       Medications:      Current Outpatient Prescriptions:       hydrochlorothiazide (HYDRODIURIL) 25 MG tablet, Take 1 tablet (25 mg) by mouth daily, Disp: 90 tablet, Rfl: 3     promethazine (PHENERGAN) 25 MG tablet, Take 1 tablet (25 mg) by mouth every 6 hours as needed for nausea, Disp: 40 tablet, Rfl: 11     sertraline (ZOLOFT) 100 MG tablet, Take 2 tablets (200 mg) by mouth daily, Disp: 180 tablet, Rfl: 3     LORazepam (ATIVAN) 0.5 MG tablet, 1-2 daily for muscle spasm as needed. #30/month, Disp: 30 tablet, Rfl: 5     insulin glargine (LANTUS SOLOSTAR) 100 UNIT/ML injection, Inject 28 units under the skin daily., Disp: 15 mL, Rfl: 11     clindamycin (CLEOCIN) 150 MG capsule, Open 1 cap and place in 1 litre of normal saline & mix. Irrigate with 20cc each nostril QID., Disp: 10 capsule, Rfl: 3     order for DME, Equipment being ordered: one donut for sitting, Disp: 1 Device, Rfl: 0     magnesium oxide (MAG-OX) 400 (241.3 MG) MG tablet, Take 1 tablet (400 mg) by mouth daily, Disp: 90 tablet, Rfl: 3     clindamycin (CLEOCIN) 150 MG capsule, TK 1 C PO QID TILL GONE, Disp: , Rfl: 0     sodium chloride 0.9%, bottle, 0.9 % irrigation, IRRIGATE 20 ML  IN EACH NOSTRIL QID, Disp: , Rfl: 3     clindamycin (CLEOCIN) 150 MG capsule, Open one capsule and place in 1 Litre of Normal Saline and mix. Irrigate with 20 cc each nostril QID., Disp: 10 capsule, Rfl: 3     sodium chloride 0.9 % SOLN, Irrigate 20 cc each nostril QID X 2 month supply, Disp: 08992 mL, Rfl: 3     propranolol (INDERAL) 20 MG tablet, Take 1 tablet (20 mg) by mouth 2 times daily, Disp: 180 tablet, Rfl: 3     blood glucose monitoring (NO BRAND SPECIFIED) test strip, Use to test blood sugars one time daily or as directed  Accucheck Richards plus test strips, Disp: 1 Box, Rfl: 5     albuterol (PROAIR HFA, PROVENTIL HFA, VENTOLIN HFA) 108 (90 BASE) MCG/ACT inhaler, Inhale 2 puffs into the lungs every 6 hours as needed for shortness of breath / dyspnea or wheezing Ventolin or other covered brand, Disp: 3 Inhaler, Rfl: 3      insulin pen needle (BD ROSE U/F) 32G X 4 MM, Use one daily as directed.  Bd ultra fine pen needles rose, Disp: 90 each, Rfl: 3     Syringe, Disposable, 25 ML MISC, Syringe to be used for nasal irrigation, Disp: 1 each, Rfl: 3     sodium chloride 0.9 % SOLN, Irrigate 20 cc each nostril QID X 2 month supply, Disp: 80000 mL, Rfl: 3     baclofen (LIORESAL) 10 MG tablet, Take 2 tablets (20 mg) by mouth 3 times daily, Disp: 180 tablet, Rfl: 12     potassium chloride SA (POTASSIUM CHLORIDE) 20 MEQ tablet, Take 3 tablets (60 mEq) by mouth daily, Disp: 180 tablet, Rfl: 3     omeprazole (PRILOSEC) 40 MG capsule, Take 1 capsule (40 mg) by mouth daily Take 30-60 minutes before a meal., Disp: 90 capsule, Rfl: 3     Alcohol Swabs (ALCOHOL PADS) 70 % PADS, 1 Box 4 times daily, Disp: 100 each, Rfl: 3     triamcinolone (KENALOG) 0.1 % ointment, Apply sparingly to affected area three times daily for 14 days., Disp: 30 g, Rfl: 3     COMPRESSION STOCKINGS, Knee high compression socks 30-40-mm, Disp: 2 each, Rfl: 1     blood glucose monitoring (FREESTYLE) lancets, Use to test blood sugars 2 times daily or as directed., Disp: 1 Box, Rfl: 3     Blood Glucose Monitoring Suppl (ACURA BLOOD GLUCOSE METER) W/DEVICE KIT, 1 Dose 2 times daily, Disp: 1 kit, Rfl: 1     ORDER FOR DME, Equipment being ordered: BP cuff, Disp: 1 Device, Rfl: 0     ACE/ARB NOT PRESCRIBED, INTENTIONAL,, ACE & ARB not prescribed due to Allergy, Disp: , Rfl:     Allergies:    Lisinopril; Acetaminophen; Amlodipine; Morphine; Bactrim [sulfamethoxazole w/trimethoprim]; and Levaquin    Physical Examination:    The patient is a well developed, well nourished male in no apparent distress.  He is normocephalic, atraumatic with pupils equally round and reactive to light.    Oral Cavity Examination:  Normal mucosa with no masses or lesions. Very poor dentition  Nasal Examination:  normal nasal turbinates and a deviated septum.  There is a vestibulitis on right nasal vestibule.  Cultures are collected from this infection site.  Neurological Examination: Facial nerve function intact and symmetric  Integumentary Examination: cellulitis on the right malar area.     Assessment and Plan:    The patient presents with a history of chronic sinusitis, oral infections and abscesses and events of cellulitis elsewhere on the body. His sinuses have been doing well with the use of Cleocin Nasal Rinses, but the patient is having difficulty since yesterday with a right nasal vestibulitis and a right facial skin infection.

## 2017-02-16 NOTE — NURSING NOTE
Chief Complaint   Patient presents with     RECHECK     follow up facial cellulitis     Jone Schneider LPN

## 2017-02-16 NOTE — LETTER
2/16/2017       RE: Erwin Aguilar  [address] [place] [facility] [address]     Dear Colleague,    Thank you for referring your patient, Erwin Aguilar, to the [facility] EAR NOSE AND THROAT at [facility]. Please see a copy of my visit note below.    The patient presents with a history of chronic sinusitis and recurrent staph infections of the nose, mouth and elsewhere on the body. The patient reports significant improvement of his nasal symptoms with the use of Cleocin Nasal Rinses. However, yesterday, he developed a lesion on the right nasal vestibule and another on the right malar area.       All other systems were reviewed and they are either negative or they are not directly pertinent to this Otolaryngology examination.      Past Medical History:    Past Medical History   Diagnosis Date     Actinic keratosis      aldara     Anxiety      Arrhythmia      prolonged QT     Asthma      Bleeding disorder (H) 3-27-16     Cancer (H)      squamous cell skin CA     Cauda equina spinal cord injury (H)      Chronic pain      right leg     Chronic pain syndrome      Chronic sinusitis 5-1-16     Diastasis recti      Esophageal reflux      Esophageal varices in cirrhosis (H) 4/1/2014     Hospitalized for UGI blee 3/28/14, endoscopy revealed bleeding varices.     Essential hypertension, benign      Generalized anxiety disorder      H/O dental abscess      Hernia      Liver disease 3-     MEDICAL HISTORY OF -      right leg numbness due to back injury     MEDICAL HISTORY OF -      alcoholism     MEDICAL HISTORY OF -      squamous cell     Mild depression      Mixed hyperlipidemia      Nasal polyps 5-1-16     Other chronic pain      Seasonal allergic conjunctivitis      Type II or unspecified type diabetes mellitus without mention of complication, not stated as uncontrolled      Ulcer (H)      R foot     Umbilical hernia without mention of obstruction or gangrene 10/2012      Unspecified site of spinal cord injury without evidence of spinal bone injury        Past Surgical History:    Past Surgical History   Procedure Laterality Date     Surgical history of -   1/02     abcess tooth     Surgical history of -   1999     L4-5 laminectomy, cauda equina syndrome     C appendectomy  1974     Hcl squamous cell carcinoma ag  10/07     left forearm     Surgical history of -   +     herniated disk repair     Neuro stimulator  2010     Herniorrhaphy umbilical  11/8/2012     Procedure: HERNIORRHAPHY UMBILICAL;  Open Umbilical Hernia Repair With Mesh ;  Surgeon: Chase Nicholson MD;  [facility]     Endoscopy upper, colonoscopy, combined  10/19/2011     Procedure:COMBINED ENDOSCOPY UPPER, COLONOSCOPY; Upper Endoscopy, Colonoscopy with Polypectomy x4; Surgeon:AMBAR RODRÍGUEZIQ; [facility]     Transposition ulnar nerve (elbow)       right     Esophagoscopy, gastroscopy, duodenoscopy (egd), combined  3/28/2014     Procedure: COMBINED ESOPHAGOSCOPY, GASTROSCOPY, DUODENOSCOPY (EGD);  EGD, Gastric Biopsies, Esophageal Banding;  Surgeon: Reyna Tovar MD;  [facility]     Esophagoscopy, gastroscopy, duodenoscopy (egd), combined  6/9/2014     Procedure: COMBINED ESOPHAGOSCOPY, GASTROSCOPY, DUODENOSCOPY (EGD);  Surgeon: Curtis Mendez MD;  [facility]     Esophagoscopy, gastroscopy, duodenoscopy (egd), combined  7/24/2014     Procedure: COMBINED ESOPHAGOSCOPY, GASTROSCOPY, DUODENOSCOPY (EGD);  Surgeon: Gerard Samaniego MD;  [facility]     Esophagoscopy, gastroscopy, duodenoscopy (egd), combined N/A 10/31/2014     Procedure: COMBINED ESOPHAGOSCOPY, GASTROSCOPY, DUODENOSCOPY (EGD);  Surgeon: Gerard Samaniego MD;  [facility]     Colonoscopy N/A 5/12/2016     Procedure: COMBINED COLONOSCOPY, SINGLE OR MULTIPLE BIOPSY/POLYPECTOMY BY BIOPSY;  Surgeon: Ana Paula Urbina MD;  [facility]     Esophagoscopy, gastroscopy, duodenoscopy (egd), combined N/A  5/12/2016     Procedure: COMBINED ESOPHAGOSCOPY, GASTROSCOPY, DUODENOSCOPY (EGD);  Surgeon: Ana Paula Urbina MD;  Location:  GI     Tonsillectomy  10 1964     Ent surgery  1-2016     Ongoing since then       Medications:      Current Outpatient Prescriptions:      hydrochlorothiazide (HYDRODIURIL) 25 MG tablet, Take 1 tablet (25 mg) by mouth daily, Disp: 90 tablet, Rfl: 3     promethazine (PHENERGAN) 25 MG tablet, Take 1 tablet (25 mg) by mouth every 6 hours as needed for nausea, Disp: 40 tablet, Rfl: 11     sertraline (ZOLOFT) 100 MG tablet, Take 2 tablets (200 mg) by mouth daily, Disp: 180 tablet, Rfl: 3     LORazepam (ATIVAN) 0.5 MG tablet, 1-2 daily for muscle spasm as needed. #30/month, Disp: 30 tablet, Rfl: 5     insulin glargine (LANTUS SOLOSTAR) 100 UNIT/ML injection, Inject 28 units under the skin daily., Disp: 15 mL, Rfl: 11     clindamycin (CLEOCIN) 150 MG capsule, Open 1 cap and place in 1 litre of normal saline & mix. Irrigate with 20cc each nostril QID., Disp: 10 capsule, Rfl: 3     order for DME, Equipment being ordered: one donut for sitting, Disp: 1 Device, Rfl: 0     magnesium oxide (MAG-OX) 400 (241.3 MG) MG tablet, Take 1 tablet (400 mg) by mouth daily, Disp: 90 tablet, Rfl: 3     clindamycin (CLEOCIN) 150 MG capsule, TK 1 C PO QID TILL GONE, Disp: , Rfl: 0     sodium chloride 0.9%, bottle, 0.9 % irrigation, IRRIGATE 20 ML  IN EACH NOSTRIL QID, Disp: , Rfl: 3     clindamycin (CLEOCIN) 150 MG capsule, Open one capsule and place in 1 Litre of Normal Saline and mix. Irrigate with 20 cc each nostril QID., Disp: 10 capsule, Rfl: 3     sodium chloride 0.9 % SOLN, Irrigate 20 cc each nostril QID X 2 month supply, Disp: 83338 mL, Rfl: 3     propranolol (INDERAL) 20 MG tablet, Take 1 tablet (20 mg) by mouth 2 times daily, Disp: 180 tablet, Rfl: 3     blood glucose monitoring (NO BRAND SPECIFIED) test strip, Use to test blood sugars one time daily or as directed  Accucheck Richards plus test  strips, Disp: 1 Box, Rfl: 5     albuterol (PROAIR HFA, PROVENTIL HFA, VENTOLIN HFA) 108 (90 BASE) MCG/ACT inhaler, Inhale 2 puffs into the lungs every 6 hours as needed for shortness of breath / dyspnea or wheezing Ventolin or other covered brand, Disp: 3 Inhaler, Rfl: 3     insulin pen needle (BD ROSE U/F) 32G X 4 MM, Use one daily as directed.  Bd ultra fine pen needles rose, Disp: 90 each, Rfl: 3     Syringe, Disposable, 25 ML MISC, Syringe to be used for nasal irrigation, Disp: 1 each, Rfl: 3     sodium chloride 0.9 % SOLN, Irrigate 20 cc each nostril QID X 2 month supply, Disp: 86940 mL, Rfl: 3     baclofen (LIORESAL) 10 MG tablet, Take 2 tablets (20 mg) by mouth 3 times daily, Disp: 180 tablet, Rfl: 12     potassium chloride SA (POTASSIUM CHLORIDE) 20 MEQ tablet, Take 3 tablets (60 mEq) by mouth daily, Disp: 180 tablet, Rfl: 3     omeprazole (PRILOSEC) 40 MG capsule, Take 1 capsule (40 mg) by mouth daily Take 30-60 minutes before a meal., Disp: 90 capsule, Rfl: 3     Alcohol Swabs (ALCOHOL PADS) 70 % PADS, 1 Box 4 times daily, Disp: 100 each, Rfl: 3     triamcinolone (KENALOG) 0.1 % ointment, Apply sparingly to affected area three times daily for 14 days., Disp: 30 g, Rfl: 3     COMPRESSION STOCKINGS, Knee high compression socks 30-40-mm, Disp: 2 each, Rfl: 1     blood glucose monitoring (FREESTYLE) lancets, Use to test blood sugars 2 times daily or as directed., Disp: 1 Box, Rfl: 3     Blood Glucose Monitoring Suppl (ACURA BLOOD GLUCOSE METER) W/DEVICE KIT, 1 Dose 2 times daily, Disp: 1 kit, Rfl: 1     ORDER FOR DME, Equipment being ordered: BP cuff, Disp: 1 Device, Rfl: 0     ACE/ARB NOT PRESCRIBED, INTENTIONAL,, ACE & ARB not prescribed due to Allergy, Disp: , Rfl:     Allergies:    Lisinopril; Acetaminophen; Amlodipine; Morphine; Bactrim [sulfamethoxazole w/trimethoprim]; and Levaquin    Physical Examination:    The patient is a well developed, well nourished male in no apparent distress.  He is  normocephalic, atraumatic with pupils equally round and reactive to light.    Oral Cavity Examination:  Normal mucosa with no masses or lesions. Very poor dentition  Nasal Examination:  normal nasal turbinates and a deviated septum.  There is a vestibulitis on right nasal vestibule. Cultures are collected from this infection site.  Neurological Examination: Facial nerve function intact and symmetric  Integumentary Examination: cellulitis on the right malar area.     Assessment and Plan:    The patient presents with a history of chronic sinusitis, oral infections and abscesses and events of cellulitis elsewhere on the body. His sinuses have been doing well with the use of Cleocin Nasal Rinses, but the patient is having difficulty since yesterday with a right nasal vestibulitis and a right facial skin infection.     Again, thank you for allowing me to participate in the care of your patient.      Sincerely,    Antonio Chávez MD

## 2017-02-19 LAB
BACTERIA SPEC CULT: NORMAL
MICRO REPORT STATUS: NORMAL
SPECIMEN SOURCE: NORMAL

## 2017-02-20 RX ORDER — DOXYCYCLINE 100 MG/1
100 CAPSULE ORAL 2 TIMES DAILY
Qty: 20 CAPSULE | Refills: 0 | Status: SHIPPED | OUTPATIENT
Start: 2017-02-20 | End: 2017-03-02

## 2017-02-21 ENCOUNTER — TELEPHONE (OUTPATIENT)
Dept: OTOLARYNGOLOGY | Facility: CLINIC | Age: 57
End: 2017-02-21

## 2017-02-21 LAB
BACTERIA SPEC CULT: ABNORMAL
MICRO REPORT STATUS: ABNORMAL
MICROORGANISM SPEC CULT: ABNORMAL
SPECIMEN SOURCE: ABNORMAL

## 2017-02-21 NOTE — TELEPHONE ENCOUNTER
Patient returned call. He states his symptoms have improved. He has five days left on the Augmentin. He will finish the ABX as directed and let us know if anything changes.    Annette Monsivais, RN Care Coordinator  [facility] Otolaryngology/Head & Neck Surgery  Direct contact: [phone]

## 2017-02-21 NOTE — TELEPHONE ENCOUNTER
Annette,     Based upon his cultures, I change his antibiotic to doxycycline. If he is improving with the Augmentin, then don't switch. But if his clinic condition is not improving, then he should switch.     Dr. Antonio Chávez     Left detailed msg on patient's cell with above Provider's recommendations. My direct line given to return my call.  Annette Monsivais RN Care Coordinator  [facility] Otolaryngology/Head & Neck Surgery  Direct contact: [phone]

## 2017-02-23 ENCOUNTER — OFFICE VISIT (OUTPATIENT)
Dept: WOUND CARE | Facility: CLINIC | Age: 57
End: 2017-02-23

## 2017-02-23 DIAGNOSIS — Z79.4 TYPE 2 DIABETES MELLITUS WITHOUT COMPLICATION, WITH LONG-TERM CURRENT USE OF INSULIN (H): Primary | ICD-10-CM

## 2017-02-23 DIAGNOSIS — S31.819A WOUND, OPEN, BUTTOCK, RIGHT, INITIAL ENCOUNTER: ICD-10-CM

## 2017-02-23 DIAGNOSIS — L84 CALLUS OF FOOT: ICD-10-CM

## 2017-02-23 DIAGNOSIS — E11.9 TYPE 2 DIABETES MELLITUS WITHOUT COMPLICATION, WITH LONG-TERM CURRENT USE OF INSULIN (H): Primary | ICD-10-CM

## 2017-02-23 NOTE — MR AVS SNAPSHOT
After Visit Summary   2/23/2017    Erwin Aguilar    MRN: [id number]           Patient Information     Date Of Birth          1960        Visit Information        Provider Department      2/23/2017 9:30 AM Breanna Sahni RN [facility]        Today's Diagnoses     Type 2 diabetes mellitus without complication, with long-term current use of insulin (H)    -  1    Wound, open, buttock, right, initial encounter        Callus of foot           Follow-ups after your visit        Your next 10 appointments already scheduled     Mar 09, 2017  8:30 AM CST   RETURN WOUND NURSE VISIT with Breanna Sahni RN    [facility] ([facility])    [address]  4th Floor  [facility] [address] [phone]            Apr 11, 2017  8:20 AM CDT   (Arrive by 8:05 AM)   Return General Liver with Kimberly Ramirez MD   [facility] Hepatology ([facility])    [address]  3rd Floor  [facility] [address] [phone]              Who to contact     Please call your clinic at [phone] to:    Ask questions about your health    Make or cancel appointments    Discuss your medicines    Learn about your test results    Speak to your doctor   If you have compliments or concerns about an experience at your clinic, or if you wish to file a complaint, please contact [facility] Patient Relations at [phone] or email us at [email or web address]         Additional Information About Your Visit        MyChart Information     [employer] gives you secure access to your electronic health record. If you see a primary care provider, you can also send messages to your care team and make appointments. If you have questions, please call your primary care clinic.  If you do not have a primary care provider, please call [phone] and they will assist you.      [email or web address] is an electronic gateway  that provides easy, online access to your medical records. With Glide, you can request a clinic appointment, read your test results, renew a prescription or communicate with your care team.     To access your existing account, please contact your [facility] or call [phone] for assistance.        Care EveryWhere ID     This is your Care EveryWhere ID. This could be used by other organizations to access your [place] medical records  [id number]         Blood Pressure from Last 3 Encounters:   01/23/17 159/78   12/19/16 132/70   10/10/16 130/70    Weight from Last 3 Encounters:   02/16/17 101.2 kg (223 lb)   01/23/17 100.7 kg (222 lb)   12/19/16 102.1 kg (225 lb)              Today, you had the following     No orders found for display       Primary Care Provider Office Phone # Fax #    Joel Daniel Irwin Wegener, [phone]       [address] [address]        Thank you!     Thank you for choosing [facility] WOUND OSTOM[facility]  for your care. Our goal is always to provide you with excellent care. Hearing back from our patients is one way we can continue to improve our services. Please take a few minutes to complete the written survey that you may receive in the mail after your visit with us. Thank you!             Your Updated Medication List - Protect others around you: Learn how to safely use, store and throw away your medicines at www.disposemymeds.org.          This list is accurate as of: 2/23/17 10:22 AM.  Always use your most recent med list.                   Brand Name Dispense Instructions for use    * ACE/ARB NOT PRESCRIBED (INTENTIONAL)      ACE & ARB not prescribed due to Allergy       ACURA BLOOD GLUCOSE METER W/DEVICE Kit     1 kit    1 Dose 2 times daily       albuterol 108 (90 BASE) MCG/ACT Inhaler    PROAIR HFA/PROVENTIL HFA/VENTOLIN HFA    3 Inhaler    Inhale 2 puffs into the lungs every 6 hours as needed for shortness  of breath / dyspnea or wheezing Ventolin or other covered brand       Alcohol Pads 70 % Pads     100 each    1 Box 4 times daily       amoxicillin-clavulanate 875-125 MG per tablet    AUGMENTIN    20 tablet    Take 1 tablet by mouth 2 times daily for 10 days       baclofen 10 MG tablet    LIORESAL    180 tablet    Take 2 tablets (20 mg) by mouth 3 times daily       blood glucose monitoring lancets     1 Box    Use to test blood sugars 2 times daily or as directed.       blood glucose monitoring test strip    no brand specified    1 Box    Use to test blood sugars one time daily or as directed  Accucheck Richards plus test strips       * clindamycin 150 MG capsule    CLEOCIN     TK 1 C PO QID TILL GONE       * clindamycin 150 MG capsule    CLEOCIN    10 capsule    Open one capsule and place in 1 Litre of Normal Saline and mix. Irrigate with 20 cc each nostril QID.       * clindamycin 150 MG capsule    CLEOCIN    10 capsule    Open 1 cap and place in 1 litre of normal saline & mix. Irrigate with 20cc each nostril QID.       COMPRESSION STOCKINGS     2 each    Knee high compression socks 30-40-mm       doxycycline Monohydrate 100 MG Caps     20 capsule    Take 1 capsule (100 mg) by mouth 2 times daily for 10 days       hydrochlorothiazide 25 MG tablet    HYDRODIURIL    90 tablet    Take 1 tablet (25 mg) by mouth daily       insulin pen needle 32G X 4 MM    BD ROSE U/F    90 each    Use one daily as directed.  Bd ultra fine pen needles rose       LANTUS SOLOSTAR 100 UNIT/ML injection   Generic drug:  insulin glargine     15 mL    Inject 28 units under the skin daily.       LORazepam 0.5 MG tablet    ATIVAN    30 tablet    1-2 daily for muscle spasm as needed. #30/month       magnesium oxide 400 (241.3 MG) MG tablet    MAG-OX    90 tablet    Take 1 tablet (400 mg) by mouth daily       mupirocin 2 % ointment    BACTROBAN    2 g    Apply to anterior nares BID X 2 month supply       omeprazole 40 MG capsule    priLOSEC    90  capsule    Take 1 capsule (40 mg) by mouth daily Take 30-60 minutes before a meal.       * order for DME     1 Device    Equipment being ordered: BP cuff       order for DME     1 Device    Equipment being ordered: one donut for sitting       potassium chloride SA 20 MEQ CR tablet    potassium chloride    180 tablet    Take 3 tablets (60 mEq) by mouth daily       promethazine 25 MG tablet    PHENERGAN    40 tablet    Take 1 tablet (25 mg) by mouth every 6 hours as needed for nausea       propranolol 20 MG tablet    INDERAL    180 tablet    Take 1 tablet (20 mg) by mouth 2 times daily       sertraline 100 MG tablet    ZOLOFT    180 tablet    Take 2 tablets (200 mg) by mouth daily       * sodium chloride 0.9 % Soln     83980 mL    Irrigate 20 cc each nostril QID X 2 month supply       * sodium chloride 0.9 % Soln     90339 mL    Irrigate 20 cc each nostril QID X 2 month supply       sodium chloride 0.9% (bottle) 0.9 % irrigation      IRRIGATE 20 ML  IN EACH NOSTRIL QID       Syringe (Disposable) 25 ML Misc     1 each    Syringe to be used for nasal irrigation       triamcinolone 0.1 % ointment    KENALOG    30 g    Apply sparingly to affected area three times daily for 14 days.       * Notice:  This list has 7 medication(s) that are the same as other medications prescribed for you. Read the directions carefully, and ask your doctor or other care provider to review them with you.

## 2017-02-23 NOTE — PROGRESS NOTES
Patient comes to wound clinic for wound assessment per request of dr. Licona. He has history of a ulcer on the right fleshy buttock due to Neuropathic Ulcer: Pressure ulcer, stage II:  Located on right buttock near anus, still present. Not painful because patient lacks sensation below the waist 2/2 complication of back surgery in the past. He has noted bleeding and oozing from the ulcer.  Clean gloves are donned and current dressing removed. Previous treatment includes: none  This is treatment week:    Objective findings:      Location: right buttock near anus non-tracking    Wound measured.: L 2.5 cm x, W 1.5 cm x, D 0.2  cm, Fullthickness.    Wound description: no sign of infection    Tenderness:no    Erythema surrounding the wound worrisome: no erythemia    Odor: none     Not inflamed.    Drainage is light and moderate, serosanguinous     Tunneling:  N/A      Undermining: N/A    Wound Base: moist, granulation, non-granulation and slough dermis    Palpation of the wound bed:  normal    Slough appearance:  adherent, dry and tan   15  %    Eschar appearance:  none        Periwound Skin: intact,      Color: normal and consistent with surrounding tissue    Also evalauted right heel wound which wasn't a wound but just a deep callus, Will refer to Dr. Anaya for treatment          Subjective finding:     Pt states: I have had this wound for months    Patient is assessed for discomfort which is: absent    Today's status of the wound: initial assessment    Treatment: Removal of existing dressing  Visual inspection  Cleansing with scrubcare solution  Irrigation with saline solution  Wound packing with Polymem and dry gauze  Application of clean dressing, ,Non-excisional debridement (no cutting was done), however removal of debris was performed with swabs, gauze and/or wet to dry dressings.    Reiterated not to use alcohol for cleaning    Treated and follow-up appointment made    Pt received the following instructions:   Cleansing with PCMX Soap.Irrigate with Normal Saline Primary Dressing polymem. Secondary Dressing: dry gauze  Secure with: tape. Frequency:Once daily.Signs and symptoms of infection taught.  Patient needs to be seen 2 weeks. Dr. Anaya was available for supervision of care if needed or if questions should arise and regarding plan of care. Breanna Sahni RN

## 2017-03-09 ENCOUNTER — OFFICE VISIT (OUTPATIENT)
Dept: WOUND CARE | Facility: CLINIC | Age: 57
End: 2017-03-09

## 2017-03-09 DIAGNOSIS — S31.819A WOUND, OPEN, BUTTOCK, RIGHT, INITIAL ENCOUNTER: Primary | ICD-10-CM

## 2017-03-09 NOTE — MR AVS SNAPSHOT
After Visit Summary   3/9/2017    Erwni Aguilar    MRN: [id number]           Patient Information     Date Of Birth          1960        Visit Information        Provider Department      3/9/2017 8:30 AM Bre[facility]ni RN [facility]        Today's Diagnoses     Wound, open, buttock, right, initial encounter    -  1       Follow-ups after your visit        Follow-up notes from your care team     Return in about 2 weeks (around 3/23/2017).      Your next 10 appointments already scheduled     Mar 23, 2017  8:30 AM CDT   (Arrive by 8:15 AM)   New Patient Visit with Victor M Anaya DPM   [facility] Wound Care ([facility])    [address]  [address] [facility] [address] [phone]            Mar 23, 2017  9:30 AM CDT   RETURN WOUND NURSE VISIT with Breanna Sahni RN   [facility] Wound Ostomy ([facility])    [address]  [address] [facility] [address] [phone]            Apr 11, 2017  8:20 AM CDT   (Arrive by 8:05 AM)   Return General Liver with Kimberly Ramirez MD   [facility] Hepatology ([facility])    [address] [place] [address]  [address] [facility] [address] [phone]              Who to contact     Please call your clinic at [phone] to:    Ask questions about your health    Make or cancel appointments    Discuss your medicines    Learn about your test results    Speak to your doctor   If you have compliments or concerns about an experience at your clinic, or if you wish to file a complaint, please contact [facility] Patient Relations at [phone] or email us at [email or web address]         Additional Information About Your Visit        MyChart Information     "[employer]"hart gives you secure access to your electronic health record. If you see a primary care provider, you can also send messages to your care team  and make appointments. If you have questions, please call your primary care clinic.  If you do not have a primary care provider, please call [phone] and they will assist you.      eCaring is an electronic gateway that provides easy, online access to your medical records. With eCaring, you can request a clinic appointment, read your test results, renew a prescription or communicate with your care team.     To access your existing account, please contact your [facility] or call [phone] for assistance.        Care EveryWhere ID     This is your Care EveryWhere ID. This could be used by other organizations to access your [place] medical records  [id number]         Blood Pressure from Last 3 Encounters:   01/23/17 159/78   12/19/16 132/70   10/10/16 130/70    Weight from Last 3 Encounters:   02/16/17 101.2 kg (223 lb)   01/23/17 100.7 kg (222 lb)   12/19/16 102.1 kg (225 lb)              Today, you had the following     No orders found for display       Primary Care Provider Office Phone # Fax #    Joel Daniel Irwin Wegener, [phone]       [address] [address]        Thank you!     Thank you for choosing [facility] WOUND OSTOMY  for your care. Our goal is always to provide you with excellent care. Hearing back from our patients is one way we can continue to improve our services. Please take a few minutes to complete the written survey that you may receive in the mail after your visit with us. Thank you!             Your Updated Medication List - Protect others around you: Learn how to safely use, store and throw away your medicines at www.disposemymeds.org.          This list is accurate as of: 3/9/17  9:03 AM.  Always use your most recent med list.                   Brand Name Dispense Instructions for use    * ACE/ARB NOT PRESCRIBED (INTENTIONAL)      ACE & ARB not prescribed due to Allergy       ACURA BLOOD GLUCOSE METER  W/DEVICE Kit     1 kit    1 Dose 2 times daily       albuterol 108 (90 BASE) MCG/ACT Inhaler    PROAIR HFA/PROVENTIL HFA/VENTOLIN HFA    3 Inhaler    Inhale 2 puffs into the lungs every 6 hours as needed for shortness of breath / dyspnea or wheezing Ventolin or other covered brand       Alcohol Pads 70 % Pads     100 each    1 Box 4 times daily       baclofen 10 MG tablet    LIORESAL    180 tablet    Take 2 tablets (20 mg) by mouth 3 times daily       blood glucose monitoring lancets     1 Box    Use to test blood sugars 2 times daily or as directed.       blood glucose monitoring test strip    no brand specified    1 Box    Use to test blood sugars one time daily or as directed  Accucheck Richards plus test strips       * clindamycin 150 MG capsule    CLEOCIN     TK 1 C PO QID TILL GONE       * clindamycin 150 MG capsule    CLEOCIN    10 capsule    Open one capsule and place in 1 Litre of Normal Saline and mix. Irrigate with 20 cc each nostril QID.       * clindamycin 150 MG capsule    CLEOCIN    10 capsule    Open 1 cap and place in 1 litre of normal saline & mix. Irrigate with 20cc each nostril QID.       COMPRESSION STOCKINGS     2 each    Knee high compression socks 30-40-mm       hydrochlorothiazide 25 MG tablet    HYDRODIURIL    90 tablet    Take 1 tablet (25 mg) by mouth daily       insulin pen needle 32G X 4 MM    BD ROSE U/F    90 each    Use one daily as directed.  Bd ultra fine pen needles rose       LANTUS SOLOSTAR 100 UNIT/ML injection   Generic drug:  insulin glargine     15 mL    Inject 28 units under the skin daily.       LORazepam 0.5 MG tablet    ATIVAN    30 tablet    1-2 daily for muscle spasm as needed. #30/month       magnesium oxide 400 (241.3 MG) MG tablet    MAG-OX    90 tablet    Take 1 tablet (400 mg) by mouth daily       mupirocin 2 % ointment    BACTROBAN    2 g    Apply to anterior nares BID X 2 month supply       omeprazole 40 MG capsule    priLOSEC    90 capsule    Take 1 capsule (40  mg) by mouth daily Take 30-60 minutes before a meal.       * order for DME     1 Device    Equipment being ordered: BP cuff       order for DME     1 Device    Equipment being ordered: one donut for sitting       potassium chloride SA 20 MEQ CR tablet    potassium chloride    180 tablet    Take 3 tablets (60 mEq) by mouth daily       promethazine 25 MG tablet    PHENERGAN    40 tablet    Take 1 tablet (25 mg) by mouth every 6 hours as needed for nausea       propranolol 20 MG tablet    INDERAL    180 tablet    Take 1 tablet (20 mg) by mouth 2 times daily       sertraline 100 MG tablet    ZOLOFT    180 tablet    Take 2 tablets (200 mg) by mouth daily       * sodium chloride 0.9 % Soln     95734 mL    Irrigate 20 cc each nostril QID X 2 month supply       * sodium chloride 0.9 % Soln     34307 mL    Irrigate 20 cc each nostril QID X 2 month supply       sodium chloride 0.9% (bottle) 0.9 % irrigation      IRRIGATE 20 ML  IN EACH NOSTRIL QID       Syringe (Disposable) 25 ML Misc     1 each    Syringe to be used for nasal irrigation       triamcinolone 0.1 % ointment    KENALOG    30 g    Apply sparingly to affected area three times daily for 14 days.       * Notice:  This list has 7 medication(s) that are the same as other medications prescribed for you. Read the directions carefully, and ask your doctor or other care provider to review them with you.

## 2017-03-20 ENCOUNTER — MYC MEDICAL ADVICE (OUTPATIENT)
Dept: FAMILY MEDICINE | Facility: CLINIC | Age: 57
End: 2017-03-20

## 2017-03-23 ENCOUNTER — MYC MEDICAL ADVICE (OUTPATIENT)
Dept: WOUND CARE | Facility: CLINIC | Age: 57
End: 2017-03-23

## 2017-03-23 ENCOUNTER — OFFICE VISIT (OUTPATIENT)
Dept: WOUND CARE | Facility: CLINIC | Age: 57
End: 2017-03-23

## 2017-03-23 VITALS
HEIGHT: 70 IN | WEIGHT: 225.6 LBS | SYSTOLIC BLOOD PRESSURE: 144 MMHG | OXYGEN SATURATION: 100 % | BODY MASS INDEX: 32.3 KG/M2 | HEART RATE: 74 BPM | TEMPERATURE: 98.3 F | DIASTOLIC BLOOD PRESSURE: 83 MMHG

## 2017-03-23 DIAGNOSIS — M20.42 HAMMER TOES OF BOTH FEET: ICD-10-CM

## 2017-03-23 DIAGNOSIS — L84 TYLOMA: ICD-10-CM

## 2017-03-23 DIAGNOSIS — M21.371 FOOT DROP, RIGHT FOOT: ICD-10-CM

## 2017-03-23 DIAGNOSIS — S31.819A WOUND, OPEN, BUTTOCK, RIGHT, INITIAL ENCOUNTER: Primary | ICD-10-CM

## 2017-03-23 DIAGNOSIS — M20.31 HALLUX HAMMERTOE, RIGHT: ICD-10-CM

## 2017-03-23 DIAGNOSIS — I87.2 VENOUS STASIS DERMATITIS OF BOTH LOWER EXTREMITIES: ICD-10-CM

## 2017-03-23 DIAGNOSIS — I73.9 PVD (PERIPHERAL VASCULAR DISEASE) (H): Primary | ICD-10-CM

## 2017-03-23 DIAGNOSIS — M20.41 HAMMER TOES OF BOTH FEET: ICD-10-CM

## 2017-03-23 DIAGNOSIS — E11.42 TYPE 2 DIABETES MELLITUS WITH DIABETIC POLYNEUROPATHY, WITHOUT LONG-TERM CURRENT USE OF INSULIN (H): ICD-10-CM

## 2017-03-23 RX ORDER — UREA 200 MG/G
CREAM TOPICAL DAILY
Qty: 480 G | Refills: 1 | Status: SHIPPED | OUTPATIENT
Start: 2017-03-23 | End: 2017-10-03

## 2017-03-23 ASSESSMENT — PAIN SCALES - GENERAL: PAINLEVEL: SEVERE PAIN (6)

## 2017-03-23 NOTE — NURSING NOTE
"Chief Complaint   Patient presents with     Wound Check     heel callous consult       Vitals:    03/23/17 0839   BP: 144/83   BP Location: Left arm   Patient Position: Chair   Cuff Size: Adult Regular   Pulse: 74   Temp: 98.3  F (36.8  C)   TempSrc: Oral   SpO2: 100%   Weight: 225 lb 9.6 oz   Height: 5' 10\"       Body mass index is 32.37 kg/(m^2).      Heidi Solorzano"

## 2017-03-23 NOTE — PROGRESS NOTES
Date of Service: 3/23/2017    Chief Complaint:   Chief Complaint   Patient presents with     Wound Check     heel callous consult        HPI: Erwin is a 56 year old male with PMH of DM II who presents today for foot care. Erwin underwent spinal surgery for a L4 - S1 condition in 2009, which unfortunately resulted in the complication of cauda equina syndrome. Since then, he has had chronic pain in his lower back and extremities, especially on the right side. He has a medtronic implanted stimulator devices which helps. He has completely lost sensation in his right leg and foot, while his left has retained some. Because of this, he has drop foot and notices that he drags his toes when he walks. Before his patella fracture in about 2015, he wore a white plastic brace on the right side (foot to knee) which helped correct his gait. He is interested in getting a new brace or inserts made. He is also wondering why his toes are curling and rubbing up against his shoes. He also reports that he has a history of ulcerations and cellulitis x 3-4 on the right leg. He has never seen a vascular specialist. He controls his blood sugars very well with insulin, saying his last A1C was 5.5%. He wears compression stockings 30-40 mmhg daily. Denies open sores, tingling, burning and foot pain at time of visit.    PMH:   Past Medical History:   Diagnosis Date     Actinic keratosis     aldara     Anxiety      Arrhythmia     prolonged QT     Asthma      Bleeding disorder (H) 3-27-16     Cancer (H)     squamous cell skin CA     Cauda equina spinal cord injury (H)      Chronic pain     right leg     Chronic pain syndrome      Chronic sinusitis 5-1-16     Diastasis recti      Esophageal reflux      Esophageal varices in cirrhosis (H) 4/1/2014    Hospitalized for UGI blee 3/28/14, endoscopy revealed bleeding varices.     Essential hypertension, benign      Generalized anxiety disorder      H/O dental abscess      Hernia      Liver disease  3-     MEDICAL HISTORY OF -     right leg numbness due to back injury     MEDICAL HISTORY OF -     alcoholism     MEDICAL HISTORY OF -     squamous cell     Mild depression      Mixed hyperlipidemia      Nasal polyps 5-1-16     Other chronic pain      Seasonal allergic conjunctivitis      Type II or unspecified type diabetes mellitus without mention of complication, not stated as uncontrolled      Ulcer (H)     R foot     Umbilical hernia without mention of obstruction or gangrene 10/2012     Unspecified site of spinal cord injury without evidence of spinal bone injury        PSxH:   Past Surgical History:   Procedure Laterality Date     C APPENDECTOMY  1974     COLONOSCOPY N/A 5/12/2016    Procedure: COMBINED COLONOSCOPY, SINGLE OR MULTIPLE BIOPSY/POLYPECTOMY BY BIOPSY;  Surgeon: Ana Paula Urbina MD;  Location:  GI     ENDOSCOPY UPPER, COLONOSCOPY, COMBINED  10/19/2011    Procedure:COMBINED ENDOSCOPY UPPER, COLONOSCOPY; Upper Endoscopy, Colonoscopy with Polypectomy x4; Surgeon:AMBAR RODRÍGUEZIQ; Location: OR     ENT SURGERY  1-2016    Ongoing since then     ESOPHAGOSCOPY, GASTROSCOPY, DUODENOSCOPY (EGD), COMBINED  3/28/2014    Procedure: COMBINED ESOPHAGOSCOPY, GASTROSCOPY, DUODENOSCOPY (EGD);  EGD, Gastric Biopsies, Esophageal Banding;  Surgeon: Reyna Tovar MD;  Location:  OR     ESOPHAGOSCOPY, GASTROSCOPY, DUODENOSCOPY (EGD), COMBINED  6/9/2014    Procedure: COMBINED ESOPHAGOSCOPY, GASTROSCOPY, DUODENOSCOPY (EGD);  Surgeon: Curtis Mendez MD;  Location:  GI     ESOPHAGOSCOPY, GASTROSCOPY, DUODENOSCOPY (EGD), COMBINED  7/24/2014    Procedure: COMBINED ESOPHAGOSCOPY, GASTROSCOPY, DUODENOSCOPY (EGD);  Surgeon: Gerard Samaniego MD;  Location:  OR     ESOPHAGOSCOPY, GASTROSCOPY, DUODENOSCOPY (EGD), COMBINED N/A 10/31/2014    Procedure: COMBINED ESOPHAGOSCOPY, GASTROSCOPY, DUODENOSCOPY (EGD);  Surgeon: Gerard Samaniego MD;  Location:  OR     ESOPHAGOSCOPY, GASTROSCOPY,  "DUODENOSCOPY (EGD), COMBINED N/A 5/12/2016    Procedure: COMBINED ESOPHAGOSCOPY, GASTROSCOPY, DUODENOSCOPY (EGD);  Surgeon: Ana Paula Uribna MD;  Location: [facility]     HCL SQUAMOUS CELL CARCINOMA AG  10/07    left forearm     HERNIORRHAPHY UMBILICAL  11/8/2012    Procedure: HERNIORRHAPHY UMBILICAL;  Open Umbilical Hernia Repair With Mesh ;  Surgeon: Chase Nicholson MD;  Location: [facility]     neuro stimulator  2010     SURGICAL HISTORY OF -   1/02    abcess tooth     SURGICAL HISTORY OF -   1999    L4-5 laminectomy, cauda equina syndrome     SURGICAL HISTORY OF -   +    herniated disk repair     TONSILLECTOMY  10 1964     TRANSPOSITION ULNAR NERVE (ELBOW)      right       Allergies: Lisinopril; Acetaminophen; Amlodipine; Morphine; Bactrim [sulfamethoxazole w/trimethoprim]; and Levaquin    SH:   Social History     Social History     Marital status: Single     Spouse name: N/A     Number of children: 0     Years of education: N/A     Occupational History     sales Unemployed     Social History Main Topics     Smoking status: Former Smoker     Packs/day: 0.50     Years: 1.00     Types: Cigarettes     Start date: 10/13/1983     Quit date: 6/9/1984     Smokeless tobacco: Never Used     Alcohol use No      Comment: a pint of alcohol / day - last drink was 3/28/14     Drug use: 2.00 per week     Special: Marijuana      Comment: marijuana - occasional     Sexual activity: Not Currently     Partners: Female     Other Topics Concern     Parent/Sibling W/ Cabg, Mi Or Angioplasty Before 65f 55m? No     Social History Narrative       FH:   Family History   Problem Relation Age of Onset     DIABETES Brother      amputation, Type 1     Cancer - colorectal No family hx of      CANCER Mother      lung     CANCER Father      esophogeal, GERD     DIABETES Brother        Objective:  98.3 74 Data Unavailable 144/83 5' 10\" 225 lbs 9.6 oz    PT and DP pulses are 2/4 left and 1/4 right. CRT is <3 seconds. Diminished pedal " hair.   Sharp/dull is absent in Right foot and severely decreased in Left foot with 5.07 Conway-Tacho monofilament.  Equinus present bilaterally, R>L. No pain with active or passive ROM of the ankle, MTJ, 1st ray, or halluces bilaterally. Decreased joint motion to ankle, subtalar, MTJ, first ray and halluces. Strength: dorsiflexion and plantar flexion 3/5 bilaterally. Adduction and abduction 4/5 bilaterally. Dorsally contracted digits 1-5 right foot, 2-5 left foot.   Nails normal bilaterally. Dried blood found at distal nail of third digits No open lesions are noted. Scabbed lesion overlying hard papule/vein on superior lateral right leg. Hyperkeratotic lesion noted to lateral heel with underlying ecchymosis. Pre-ulcerative lesion noted to dorsal surface of right hallux. Venous stasis discoloration to LE bilaterally. Mild edema bilaterally.    Lab Results   Component Value Date    A1C 5.5 10/10/2016    A1C 6.4 05/10/2016    A1C 5.7 08/04/2015     Assessment:   - Drop foot, right 2/2 cauda equina syndrome  - Hammertoes  - Tyloma  - Peripheral vascular disease   - DM type 2, controlled  - Peripheral neuropathy    Plan:  - Pt seen and evaluated. Diagnosis and treatment options discussed.   - Callus debrided x 1 upon consent without complication  - Mepilex border applied to scabbed area. Likely overlying a superficial varicose vein.  - Orders written for ABIs/PVRs and venous competency US BL.   - Referred patient to [facility] lab to be fitted for a R brace and/or diabetic shoes and inserts.  - Suggested digit sleeves to wear on toes during the day to prevent sores on dorsal surfaces  - Prescribed Urea cream to be applied to callused areas   - RTC 3 months or sooner if concerns.

## 2017-03-23 NOTE — MR AVS SNAPSHOT
After Visit Summary   3/23/2017    Erwin Aguilar    MRN: [id number]           Patient Information     Date Of Birth          1960        Visit Information        Provider Department      3/23/2017 8:30 AM Victor M Anaya DPM [facility]        Today's Diagnoses     PVD (peripheral vascular disease) (H)    -  1    Venous stasis dermatitis of both lower extremities        Foot drop, right foot        Type 2 diabetes mellitus with diabetic polyneuropathy, without long-term current use of insulin (H)        Hallux hammertoe, right        Hammer toes of both feet        Tyloma           Follow-ups after your visit        Additional Services     ORTHOTICS REFERRAL (Foot and Ankle)       Please be aware that coverage of these services is subject to the terms and limitations of your health insurance plan.  Call member services at your health plan with any benefit or coverage questions.      Please bring the following to your appointment:    >>   Any x-rays, CTs or MRIs which have been performed.  Contact the facility where they were done to arrange for  prior to your scheduled appointment.  Any new CT, MRI or other procedures ordered by your specialist must be performed at a [facility] facility or coordinated by your clinic's referral office.    >>   List of current medications   >>   This referral request   >>   Any documents/labs given to you for this referral    ==This Referral PRINTS in the [facility] (ORTHOTICS & PROSTHETICS) Central scheduling office ==     The [facility] Central Scheduling staff will contact patient to arrange appointments. Central Scheduling Phone #:  [place], [place]  [phone]     Diabetic shoe on the left and a [place] brace on the right with a shoe for dropfoot. Please evaluate and treat accordingly.      FOOT AND ANKLE ORTHOTIC PRESCRIPTION:  Full Length Foot Orthotic:  Foam: Left  AFO:  Right                  Your next 10  appointments already scheduled     Mar 30, 2017  7:30 AM CDT   US LIZZY DOPPLER NO EXERCISE with UCUSV1   [facility] ([facility])    [address] [id number] [phone]           Please bring a list of your medicines (including vitamins, minerals and over-the-counter drugs). Also, tell your doctor about any allergies you may have. Wear comfortable clothes and leave your valuables at home.  No caffeine or tobacco for 1 hour prior to exam.  Please call the Imaging Department at your exam site with any questions.            Mar 30, 2017  8:00 AM CDT   US LOWER EXTREMITY VENOUS COMPETENCY BILATERAL with UCUSV1   [facility] ([facility])    [address] [place] [id number] [phone]           Please bring a list of your medicines (including vitamins, minerals and over-the-counter drugs). Also, tell your doctor about any allergies you may have. Wear comfortable clothes and leave your valuables at home.  You do not need to do anything special to prepare for your exam.  Please call the Imaging Department at your exam site with any questions.            Mar 31, 2017  8:30 AM CDT   RETURN WOUND NURSE VISIT with Breanna Sahni RN   [facility] ([facility])    [address] [place] [id number] [phone]            Apr 11, 2017  8:20 AM CDT   (Arrive by 8:05 AM)   Return General Liver with Kimberly Ramirez MD   [facility] ([facility])    [address] [place] [id number] [phone]            Jun 22, 2017  8:30 AM CDT   (Arrive by 8:15 AM)   Return Visit with Victor M Anaya DPM   [facility] ([facility])    [address] [place] [id number] [phone]              Future  "tests that were ordered for you today     Open Future Orders        Priority Expected Expires Ordered    US LIZZY Doppler No Exercise Routine  3/23/2018 3/23/2017    US Low Ext Venous Competency Bilateral (vascular lab) Routine  3/23/2018 3/23/2017            Who to contact     Please call your clinic at [phone] to:    Ask questions about your health    Make or cancel appointments    Discuss your medicines    Learn about your test results    Speak to your doctor   If you have compliments or concerns about an experience at your clinic, or if you wish to file a complaint, please contact [facility] Patient Relations at [phone] or email us at [email or web address]         Additional Information About Your Visit        [employer] Information     [employer] gives you secure access to your electronic health record. If you see a primary care provider, you can also send messages to your care team and make appointments. If you have questions, please call your primary care clinic.  If you do not have a primary care provider, please call [phone] and they will assist you.      [employer] is an electronic gateway that provides easy, online access to your medical records. With [employer], you can request a clinic appointment, read your test results, renew a prescription or communicate with your care team.     To access your existing account, please contact your [facility] or call [phone] for assistance.        Care EveryWhere ID     This is your Care EveryWhere ID. This could be used by other organizations to access your [facility] medical records  [id number]        Your Vitals Were     Pulse Temperature Height Pulse Oximetry BMI (Body Mass Index)       74 98.3  F (36.8  C) (Oral) 5' 10\" 100% 32.37 kg/m2        Blood Pressure from Last 3 Encounters:   03/23/17 144/83   01/23/17 159/78   12/19/16 132/70    Weight from Last 3 Encounters:   03/23/17 225 lb 9.6 " oz   02/16/17 223 lb   01/23/17 222 lb              We Performed the Following     ORTHOTICS REFERRAL (Foot and Ankle)     TRIM HYPERKERATOTIC SKIN LESION, ONE          Today's Medication Changes          These changes are accurate as of: 3/23/17 10:16 AM.  If you have any questions, ask your nurse or doctor.               Start taking these medicines.        Dose/Directions    Urea 20 % Crea   Used for:  Tyloma   Started by:  Victor M Anaya DPM        Externally apply topically daily   Quantity:  480 g   Refills:  1         These medicines have changed or have updated prescriptions.        Dose/Directions    * clindamycin 150 MG capsule   Commonly known as:  CLEOCIN   This may have changed:  Another medication with the same name was removed. Continue taking this medication, and follow the directions you see here.   Used for:  Chronic maxillary sinusitis   Changed by:  Antonio Chávez MD        Open one capsule and place in 1 Litre of Normal Saline and mix. Irrigate with 20 cc each nostril QID.   Quantity:  10 capsule   Refills:  3       * clindamycin 150 MG capsule   Commonly known as:  CLEOCIN   This may have changed:  Another medication with the same name was removed. Continue taking this medication, and follow the directions you see here.   Used for:  Chronic sinusitis, unspecified location   Changed by:  Antonio Chávez MD        Open 1 cap and place in 1 litre of normal saline & mix. Irrigate with 20cc each nostril QID.   Quantity:  10 capsule   Refills:  3       * Notice:  This list has 2 medication(s) that are the same as other medications prescribed for you. Read the directions carefully, and ask your doctor or other care provider to review them with you.         Where to get your medicines      These medications were sent to [facility]/pharmacy #[id number] - [place], [place] - [address]  [address], [place] [address]     Phone:  [phone]     Urea 20 % Crea                 Primary Care Provider Office Phone # Fax #    Joel Daniel Irwin Wegener, [phone]       [address] [address]        Thank you!     Thank you for choosing [facility]  for your care. Our goal is always to provide you with excellent care. Hearing back from our patients is one way we can continue to improve our services. Please take a few minutes to complete the written survey that you may receive in the mail after your visit with us. Thank you!             Your Updated Medication List - Protect others around you: Learn how to safely use, store and throw away your medicines at www.disposemymeds.org.          This list is accurate as of: 3/23/17 10:16 AM.  Always use your most recent med list.                   Brand Name Dispense Instructions for use    * ACE/ARB NOT PRESCRIBED (INTENTIONAL)      ACE & ARB not prescribed due to Allergy       ACURA BLOOD GLUCOSE METER W/DEVICE Kit     1 kit    1 Dose 2 times daily       albuterol 108 (90 BASE) MCG/ACT Inhaler    PROAIR HFA/PROVENTIL HFA/VENTOLIN HFA    3 Inhaler    Inhale 2 puffs into the lungs every 6 hours as needed for shortness of breath / dyspnea or wheezing Ventolin or other covered brand       Alcohol Pads 70 % Pads     100 each    1 Box 4 times daily       baclofen 10 MG tablet    LIORESAL    180 tablet    Take 2 tablets (20 mg) by mouth 3 times daily       blood glucose monitoring lancets     1 Box    Use to test blood sugars 2 times daily or as directed.       blood glucose monitoring test strip    no brand specified    1 Box    Use to test blood sugars one time daily or as directed  Accucheck Richards plus test strips       * clindamycin 150 MG capsule    CLEOCIN    10 capsule    Open one capsule and place in 1 Litre of Normal Saline and mix. Irrigate with 20 cc each nostril QID.       * clindamycin 150 MG capsule    CLEOCIN    10 capsule    Open 1 cap and place in 1 litre of normal saline & mix.  Irrigate with 20cc each nostril QID.       COMPRESSION STOCKINGS     2 each    Knee high compression socks 30-40-mm       hydrochlorothiazide 25 MG tablet    HYDRODIURIL    90 tablet    Take 1 tablet (25 mg) by mouth daily       insulin pen needle 32G X 4 MM    BD ROSE U/F    90 each    Use one daily as directed.  Bd ultra fine pen needles rose       LANTUS SOLOSTAR 100 UNIT/ML injection   Generic drug:  insulin glargine     15 mL    Inject 28 units under the skin daily.       LORazepam 0.5 MG tablet    ATIVAN    30 tablet    1-2 daily for muscle spasm as needed. #30/month       mupirocin 2 % ointment    BACTROBAN    2 g    Apply to anterior nares BID X 2 month supply       omeprazole 40 MG capsule    priLOSEC    90 capsule    Take 1 capsule (40 mg) by mouth daily Take 30-60 minutes before a meal.       * order for DME     1 Device    Equipment being ordered: BP cuff       order for DME     1 Device    Equipment being ordered: one donut for sitting       potassium chloride SA 20 MEQ CR tablet    potassium chloride    180 tablet    Take 3 tablets (60 mEq) by mouth daily       promethazine 25 MG tablet    PHENERGAN    40 tablet    Take 1 tablet (25 mg) by mouth every 6 hours as needed for nausea       propranolol 20 MG tablet    INDERAL    180 tablet    Take 1 tablet (20 mg) by mouth 2 times daily       sertraline 100 MG tablet    ZOLOFT    180 tablet    Take 2 tablets (200 mg) by mouth daily       * sodium chloride 0.9 % Soln     21268 mL    Irrigate 20 cc each nostril QID X 2 month supply       * sodium chloride 0.9 % Soln     86091 mL    Irrigate 20 cc each nostril QID X 2 month supply       sodium chloride 0.9% (bottle) 0.9 % irrigation      IRRIGATE 20 ML  IN EACH NOSTRIL QID       Syringe (Disposable) 25 ML Misc     1 each    Syringe to be used for nasal irrigation       triamcinolone 0.1 % ointment    KENALOG    30 g    Apply sparingly to affected area three times daily for 14 days.       Urea 20 % Crea     480  g    Externally apply topically daily       * Notice:  This list has 6 medication(s) that are the same as other medications prescribed for you. Read the directions carefully, and ask your doctor or other care provider to review them with you.

## 2017-03-23 NOTE — MR AVS SNAPSHOT
After Visit Summary   3/23/2017    Erwin Aguilar    MRN: [id number]           Patient Information     Date Of Birth          1960        Visit Information        Provider Department      3/23/2017 9:30 AM Breanna Sahni RN  [facility]        Today's Diagnoses     Wound, open, buttock, right, initial encounter    -  1       Follow-ups after your visit        Your next 10 appointments already scheduled     Mar 31, 2017  8:30 AM CDT   RETURN WOUND NURSE VISIT with Breanna Sahni RN   [facility]omy ([facility])    [address] [place] [address]  4th [facility] [id number] [phone]            Apr 11, 2017  8:20 AM CDT   (Arrive by 8:05 AM)   Return General Liver with Kimberly Ramirez MD   [facility] ([facility])    [address] [place] [address]  3rd [facility] [id number] [phone]            Jun 22, 2017  8:30 AM CDT   (Arrive by 8:15 AM)   Return Visit with Victor M Anaya DPM    [facility] ([facility])    [address] [place] [address]  4th [facility] [id number] [phone]              Future tests that were ordered for you today     Open Future Orders        Priority Expected Expires Ordered    US LIZZY Doppler No Exercise Routine  3/23/2018 3/23/2017    US Low Ext Venous Competency Bilateral (vascular lab) Routine  3/23/2018 3/23/2017            Who to contact     Please call your clinic at [phone] to:    Ask questions about your health    Make or cancel appointments    Discuss your medicines    Learn about your test results    Speak to your doctor   If you have compliments or concerns about an experience at your clinic, or if you wish to file a complaint, please contact [facility] Patient Relations at [phone] or email us at [email or web address]         Additional Information About Your Visit         [employer] Information     [email or web address] gives you secure access to your electronic health record. If you see a primary care provider, you can also send messages to your care team and make appointments. If you have questions, please call your primary care clinic.  If you do not have a primary care provider, please call [phone] and they will assist you.      [email or web address] is an electronic gateway that provides easy, online access to your medical records. With [email or web address], you can request a clinic appointment, read your test results, renew a prescription or communicate with your care team.     To access your existing account, please contact your [facility] or call [phone] for assistance.        Care EveryWhere ID     This is your Care EveryWhere ID. This could be used by other organizations to access your [facility] medical records  [id number]         Blood Pressure from Last 3 Encounters:   03/23/17 144/83   01/23/17 159/78   12/19/16 132/70    Weight from Last 3 Encounters:   03/23/17 102.3 kg (225 lb 9.6 oz)   02/16/17 101.2 kg (223 lb)   01/23/17 100.7 kg (222 lb)              Today, you had the following     No orders found for display         Today's Medication Changes          These changes are accurate as of: 3/23/17  9:46 AM.  If you have any questions, ask your nurse or doctor.               Start taking these medicines.        Dose/Directions    Urea 20 % Crea   Used for:  Tyloma   Started by:  Victor M Anaya DPM        Externally apply topically daily   Quantity:  480 g   Refills:  1         These medicines have changed or have updated prescriptions.        Dose/Directions    * clindamycin 150 MG capsule   Commonly known as:  CLEOCIN   This may have changed:  Another medication with the same name was removed. Continue taking this medication, and follow the directions you see here.   Used for:  Chronic maxillary sinusitis   Changed by:  Antonio Chávez MD         Open one capsule and place in 1 Litre of Normal Saline and mix. Irrigate with 20 cc each nostril QID.   Quantity:  10 capsule   Refills:  3       * clindamycin 150 MG capsule   Commonly known as:  CLEOCIN   This may have changed:  Another medication with the same name was removed. Continue taking this medication, and follow the directions you see here.   Used for:  Chronic sinusitis, unspecified location   Changed by:  Antonio Chávez MD        Open 1 cap and place in 1 litre of normal saline & mix. Irrigate with 20cc each nostril QID.   Quantity:  10 capsule   Refills:  3       * Notice:  This list has 2 medication(s) that are the same as other medications prescribed for you. Read the directions carefully, and ask your doctor or other care provider to review them with you.         Where to get your medicines      These medications were sent to [facility]/pharmacy #[id number] - [place], [place] - [address]  [address], [place] [address]     Phone:  [phone]     Urea 20 % Crea                Primary Care Provider Office Phone # Fax #    Joel Daniel Irwin Wegener, [phone]       [facility] [address] [facility] [address]        Thank you!     Thank you for choosing [facility] WOUND [facility]ST[facility]Y  for your care. Our goal is always to provide you with excellent care. Hearing back from our patients is one way we can continue to improve our services. Please take a few minutes to complete the written survey that you may receive in the mail after your visit with us. Thank you!             Your Updated Medication List - Protect others around you: Learn how to safely use, store and throw away your medicines at www.disposemymeds.org.          This list is accurate as of: 3/23/17  9:46 AM.  Always use your most recent med list.                   Brand Name Dispense Instructions for use    * ACE/ARB NOT PRESCRIBED (INTENTIONAL)      ACE & ARB not prescribed due to Allergy       ACURA BLOOD  GLUCOSE METER W/DEVICE Kit     1 kit    1 Dose 2 times daily       albuterol 108 (90 BASE) MCG/ACT Inhaler    PROAIR HFA/PROVENTIL HFA/VENTOLIN HFA    3 Inhaler    Inhale 2 puffs into the lungs every 6 hours as needed for shortness of breath / dyspnea or wheezing Ventolin or other covered brand       Alcohol Pads 70 % Pads     100 each    1 Box 4 times daily       baclofen 10 MG tablet    LIORESAL    180 tablet    Take 2 tablets (20 mg) by mouth 3 times daily       blood glucose monitoring lancets     1 Box    Use to test blood sugars 2 times daily or as directed.       blood glucose monitoring test strip    no brand specified    1 Box    Use to test blood sugars one time daily or as directed  Accucheck Richards plus test strips       * clindamycin 150 MG capsule    CLEOCIN    10 capsule    Open one capsule and place in 1 Litre of Normal Saline and mix. Irrigate with 20 cc each nostril QID.       * clindamycin 150 MG capsule    CLEOCIN    10 capsule    Open 1 cap and place in 1 litre of normal saline & mix. Irrigate with 20cc each nostril QID.       COMPRESSION STOCKINGS     2 each    Knee high compression socks 30-40-mm       hydrochlorothiazide 25 MG tablet    HYDRODIURIL    90 tablet    Take 1 tablet (25 mg) by mouth daily       insulin pen needle 32G X 4 MM    BD ROSE U/F    90 each    Use one daily as directed.  Bd ultra fine pen needles rose       LANTUS SOLOSTAR 100 UNIT/ML injection   Generic drug:  insulin glargine     15 mL    Inject 28 units under the skin daily.       LORazepam 0.5 MG tablet    ATIVAN    30 tablet    1-2 daily for muscle spasm as needed. #30/month       mupirocin 2 % ointment    BACTROBAN    2 g    Apply to anterior nares BID X 2 month supply       omeprazole 40 MG capsule    priLOSEC    90 capsule    Take 1 capsule (40 mg) by mouth daily Take 30-60 minutes before a meal.       * order for DME     1 Device    Equipment being ordered: BP cuff       order for DME     1 Device    Equipment  being ordered: one donut for sitting       potassium chloride SA 20 MEQ CR tablet    potassium chloride    180 tablet    Take 3 tablets (60 mEq) by mouth daily       promethazine 25 MG tablet    PHENERGAN    40 tablet    Take 1 tablet (25 mg) by mouth every 6 hours as needed for nausea       propranolol 20 MG tablet    INDERAL    180 tablet    Take 1 tablet (20 mg) by mouth 2 times daily       sertraline 100 MG tablet    ZOLOFT    180 tablet    Take 2 tablets (200 mg) by mouth daily       * sodium chloride 0.9 % Soln     81970 mL    Irrigate 20 cc each nostril QID X 2 month supply       * sodium chloride 0.9 % Soln     70225 mL    Irrigate 20 cc each nostril QID X 2 month supply       sodium chloride 0.9% (bottle) 0.9 % irrigation      IRRIGATE 20 ML  IN EACH NOSTRIL QID       Syringe (Disposable) 25 ML Misc     1 each    Syringe to be used for nasal irrigation       triamcinolone 0.1 % ointment    KENALOG    30 g    Apply sparingly to affected area three times daily for 14 days.       Urea 20 % Crea     480 g    Externally apply topically daily       * Notice:  This list has 6 medication(s) that are the same as other medications prescribed for you. Read the directions carefully, and ask your doctor or other care provider to review them with you.

## 2017-03-23 NOTE — PROGRESS NOTES
Patient comes to wound clinic for wound assessment per request of dr. Licona. He has history of a ulcer on the right fleshy buttock due to Neuropathic Ulcer: Pressure ulcer, stage II:  Located on right buttock near anus, still present. Not painful because patient lacks sensation below the waist 2/2 complication of back surgery in the past. He has noted bleeding and oozing from the ulcer.  Clean gloves are donned and current dressing removed. Previous treatment includes: none  This is treatment week:     Objective findings: slight improvement but maceration medially is new       Location: right buttock near anus non-tracking    Wound measured.: L 2 cm x, W 1.0 cm x, D 0.1 cm, Fullthickness.    Wound description: no sign of infection    Tenderness:no    Erythema surrounding the wound worrisome: no erythemia    Odor: none    Not inflamed.    Drainage is light and moderate, serosanguinous     Wound Base: moist, granulation, non-granulation and slough dermis    Palpation of the wound bed: normal    Slough appearance: 0 %    Eschar appearance: none      Periwound Skin: intact, fibrous scarring maceration medially    Color: normal and consistent with surrounding tissue        Subjective finding:     Pt states: much improved    Patient is assessed for discomfort which is: absent     Today's status of the wound: healing     Treatment: Removal of existing dressing, Visual inspection, Wound packing with Polymem and dry gauze, Application of clean dressing, Treated and follow-up appointment made. Continue with current treatment but be carefull to place the dresing near the medial aspect of the wound.    Pt received the following instructions: Cleansing with PCMX Soap.Irrigate with Normal Saline Primary Dressing polymem. Secondary Dressing: dry gauze Secure with: tape. Frequency:Once daily.Signs and symptoms of infection taught. Patient needs to be seen 1 weeks. Dr. Genao was available for supervision of care if needed or if  questions should arise and regarding plan of care. Breanna Sahni RN

## 2017-03-23 NOTE — LETTER
3/23/2017       RE: Erwin Aguilar  [address]  [place] [address]     Dear Colleague,    Thank you for referring your patient, Erwin Aguilar, to the [facility] at [facility]. Please see a copy of my visit note below.    Date of Service: 3/23/2017    Chief Complaint:   Chief Complaint   Patient presents with     Wound Check     heel callous consult        HPI: Erwin is a 56 year old male with PMH of DM II who presents today for foot care. Erwin underwent spinal surgery for a L4 - S1 condition in 2009, which unfortunately resulted in the complication of cauda equina syndrome. Since then, he has had chronic pain in his lower back and extremities, especially on the right side. He has a medtronic implanted stimulator devices which helps. He has completely lost sensation in his right leg and foot, while his left has retained some. Because of this, he has drop foot and notices that he drags his toes when he walks. Before his patella fracture in about 2015, he wore a white plastic brace on the right side (foot to knee) which helped correct his gait. He is interested in getting a new brace or inserts made. He is also wondering why his toes are curling and rubbing up against his shoes. He also reports that he has a history of ulcerations and cellulitis x 3-4 on the right leg. He has never seen a vascular specialist. He controls his blood sugars very well with insulin, saying his last A1C was 5.5%. He wears compression stockings 30-40 mmhg daily. Denies open sores, tingling, burning and foot pain at time of visit.    PMH:   Past Medical History:   Diagnosis Date     Actinic keratosis     aldara     Anxiety      Arrhythmia     prolonged QT     Asthma      Bleeding disorder (H) 3-27-16     Cancer (H)     squamous cell skin CA     Cauda equina spinal cord injury (H)      Chronic pain     right leg     Chronic pain syndrome      Chronic sinusitis 5-1-16      Diastasis recti      Esophageal reflux      Esophageal varices in cirrhosis (H) 4/1/2014    Hospitalized for UGI blee 3/28/14, endoscopy revealed bleeding varices.     Essential hypertension, benign      Generalized anxiety disorder      H/O dental abscess      Hernia      Liver disease 3-     MEDICAL HISTORY OF -     right leg numbness due to back injury     MEDICAL HISTORY OF -     alcoholism     MEDICAL HISTORY OF -     squamous cell     Mild depression      Mixed hyperlipidemia      Nasal polyps 5-1-16     Other chronic pain      Seasonal allergic conjunctivitis      Type II or unspecified type diabetes mellitus without mention of complication, not stated as uncontrolled      Ulcer (H)     R foot     Umbilical hernia without mention of obstruction or gangrene 10/2012     Unspecified site of spinal cord injury without evidence of spinal bone injury        PSxH:   Past Surgical History:   Procedure Laterality Date     C APPENDECTOMY  1974     COLONOSCOPY N/A 5/12/2016    Procedure: COMBINED COLONOSCOPY, SINGLE OR MULTIPLE BIOPSY/POLYPECTOMY BY BIOPSY;  Surgeon: Ana Paula Urbina MD;  Location:  GI     ENDOSCOPY UPPER, COLONOSCOPY, COMBINED  10/19/2011    Procedure:COMBINED ENDOSCOPY UPPER, COLONOSCOPY; Upper Endoscopy, Colonoscopy with Polypectomy x4; Surgeon:AMBAR RODRÍGUEZIQ; Location:[facility] OR     ENT SURGERY  1-2016    Ongoing since then     ESOPHAGOSCOPY, GASTROSCOPY, DUODENOSCOPY (EGD), COMBINED  3/28/2014    Procedure: COMBINED ESOPHAGOSCOPY, GASTROSCOPY, DUODENOSCOPY (EGD);  EGD, Gastric Biopsies, Esophageal Banding;  Surgeon: Reyna Tovar MD;  Location:  OR     ESOPHAGOSCOPY, GASTROSCOPY, DUODENOSCOPY (EGD), COMBINED  6/9/2014    Procedure: COMBINED ESOPHAGOSCOPY, GASTROSCOPY, DUODENOSCOPY (EGD);  Surgeon: Curtis Mendez MD;  Location:  GI     ESOPHAGOSCOPY, GASTROSCOPY, DUODENOSCOPY (EGD), COMBINED  7/24/2014    Procedure: COMBINED ESOPHAGOSCOPY, GASTROSCOPY, DUODENOSCOPY  (EGD);  Surgeon: Gerard Samaniego MD;  [facility]     ESOPHAGOSCOPY, GASTROSCOPY, DUODENOSCOPY (EGD), COMBINED N/A 10/31/2014    Procedure: COMBINED ESOPHAGOSCOPY, GASTROSCOPY, DUODENOSCOPY (EGD);  Surgeon: Gerard Samaniego MD;  [facility]     ESOPHAGOSCOPY, GASTROSCOPY, DUODENOSCOPY (EGD), COMBINED N/A 5/12/2016    Procedure: COMBINED ESOPHAGOSCOPY, GASTROSCOPY, DUODENOSCOPY (EGD);  Surgeon: Ana Paula Urbina MD;  [facility] GI     HCL SQUAMOUS CELL CARCINOMA AG  10/07    left forearm     HERNIORRHAPHY UMBILICAL  11/8/2012    Procedure: HERNIORRHAPHY UMBILICAL;  Open Umbilical Hernia Repair With Mesh ;  Surgeon: Chase Nicholson MD;  Location: [facility] OR     neuro stimulator  2010     SURGICAL HISTORY OF -   1/02    abcess tooth     SURGICAL HISTORY OF -   1999    L4-5 laminectomy, cauda equina syndrome     SURGICAL HISTORY OF -   +    herniated disk repair     TONSILLECTOMY  10 1964     TRANSPOSITION ULNAR NERVE (ELBOW)      right       Allergies: Lisinopril; Acetaminophen; Amlodipine; Morphine; Bactrim [sulfamethoxazole w/trimethoprim]; and Levaquin    SH:   Social History     Social History     Marital status: Single     Spouse name: N/A     Number of children: 0     Years of education: N/A     Occupational History     sales Unemployed     Social History Main Topics     Smoking status: Former Smoker     Packs/day: 0.50     Years: 1.00     Types: Cigarettes     Start date: 10/13/1983     Quit date: 6/9/1984     Smokeless tobacco: Never Used     Alcohol use No      Comment: a pint of alcohol / day - last drink was 3/28/14     Drug use: 2.00 per week     Special: Marijuana      Comment: marijuana - occasional     Sexual activity: Not Currently     Partners: Female     Other Topics Concern     Parent/Sibling W/ Cabg, Mi Or Angioplasty Before 65f 55m? No     Social History Narrative       FH:   Family History   Problem Relation Age of Onset     DIABETES Brother      amputation, Type  "1     Cancer - colorectal No family hx of      CANCER Mother      lung     CANCER Father      wallace, GERD     DIABETES Brother        Objective:  98.3 74 Data Unavailable 144/83 5' 10\" 225 lbs 9.6 oz    PT and DP pulses are 2/4 left and 1/4 right. CRT is <3 seconds. Diminished pedal hair.   Sharp/dull is absent in Right foot and severely decreased in Left foot with 5.07 Mckenna-Tacho monofilament.  Equinus present bilaterally, R>L. No pain with active or passive ROM of the ankle, MTJ, 1st ray, or halluces bilaterally. Decreased joint motion to ankle, subtalar, MTJ, first ray and halluces. Strength: dorsiflexion and plantar flexion 3/5 bilaterally. Adduction and abduction 4/5 bilaterally. Dorsally contracted digits 1-5 right foot, 2-5 left foot.   Nails normal bilaterally. Dried blood found at distal nail of third digits No open lesions are noted. Scabbed lesion overlying hard papule/vein on superior lateral right leg. Hyperkeratotic lesion noted to lateral heel with underlying ecchymosis. Pre-ulcerative lesion noted to dorsal surface of right hallux. Venous stasis discoloration to LE bilaterally. Mild edema bilaterally.    Lab Results   Component Value Date    A1C 5.5 10/10/2016    A1C 6.4 05/10/2016    A1C 5.7 08/04/2015     Assessment:   - Drop foot, right 2/2 cauda equina syndrome  - Hammertoes  - Tyloma  - Peripheral vascular disease   - DM type 2, controlled  - Peripheral neuropathy    Plan:  - Pt seen and evaluated. Diagnosis and treatment options discussed.   - Callus debrided x 1 upon consent without complication  - Mepilex border applied to scabbed area. Likely overlying a superficial varicose vein.  - Orders written for ABIs/PVRs and venous competency US BL.   - Referred patient to [facility] lab to be fitted for a R brace and/or diabetic shoes and inserts.  - Suggested digit sleeves to wear on toes during the day to prevent sores on dorsal surfaces  - Prescribed Urea cream to be applied to " callused areas   - RTC 3 months or sooner if concerns.    Again, thank you for allowing me to participate in the care of your patient.      Sincerely,  Victor M Anaya DPM

## 2017-03-27 DIAGNOSIS — F41.1 GENERALIZED ANXIETY DISORDER: ICD-10-CM

## 2017-03-27 NOTE — TELEPHONE ENCOUNTER
Medication Detail      Disp Refills Start End PAT   sertraline (ZOLOFT) 100 MG tablet 180 tablet 3 1/24/2017  No   Sig: Take 2 tablets (200 mg) by mouth daily       Last Office Visit with [facility] primary care provider:  1/23/17        Last PHQ-9 score on record=   PHQ-9 SCORE 10/10/2016   Total Score -   Total Score 1

## 2017-03-28 RX ORDER — SERTRALINE HYDROCHLORIDE 100 MG/1
200 TABLET, FILM COATED ORAL DAILY
Qty: 180 TABLET | Refills: 1 | Status: SHIPPED | OUTPATIENT
Start: 2017-03-28 | End: 2019-01-04

## 2017-03-30 DIAGNOSIS — K70.30 ALCOHOLIC CIRRHOSIS OF LIVER WITHOUT ASCITES (H): Primary | ICD-10-CM

## 2017-04-04 ENCOUNTER — OFFICE VISIT (OUTPATIENT)
Dept: WOUND CARE | Facility: CLINIC | Age: 57
End: 2017-04-04

## 2017-04-04 DIAGNOSIS — S31.819A WOUND, OPEN, BUTTOCK, RIGHT, INITIAL ENCOUNTER: Primary | ICD-10-CM

## 2017-04-04 NOTE — MR AVS SNAPSHOT
After Visit Summary   4/4/2017    Erwin Aguilar    MRN: [id number]           Patient Information     Date Of Birth          1960        Visit Information        Provider Department      4/4/2017 9:30 AM Breanna Sahni RN [facility]        Today's Diagnoses     Wound, open, buttock, right, initial encounter    -  1       Follow-ups after your visit        Your next 10 appointments already scheduled     Apr 07, 2017 12:30 PM CDT   RETURN WOUND NURSE VISIT with Breanna Sahni RN   [facility] Wound Ostomy ([facility])    [address] [place] [address]  4th [facility] [id number] [phone]            Apr 11, 2017  8:20 AM CDT   (Arrive by 8:05 AM)   Return General Liver with Kimberly Ramirez MD   [facility] Hepatology ([facility])    [address] [place] [address]  3rd [facility] [id number] [phone]            Jun 22, 2017  8:30 AM CDT   (Arrive by 8:15 AM)   Return Visit with Vcitor M Anaya DPM    [facility] ([facility])    [address] [place] [id number] [phone]              Who to contact     Please call your clinic at [phone] to:    Ask questions about your health    Make or cancel appointments    Discuss your medicines    Learn about your test results    Speak to your doctor   If you have compliments or concerns about an experience at your clinic, or if you wish to file a complaint, please contact [facility] Patient Relations at [phone] or email us at [email or web address]         Additional Information About Your Visit        MyChart Information     Plibberhart gives you secure access to your electronic health record. If you see a primary care provider, you can also send messages to your care team and make appointments. If you have questions, please call your primary care clinic.  If you do  not have a primary care provider, please call [phone] and they will assist you.      Voxel (Internap) is an electronic gateway that provides easy, online access to your medical records. With Voxel (Internap), you can request a clinic appointment, read your test results, renew a prescription or communicate with your care team.     To access your existing account, please contact your [facility] or call [phone] for assistance.        Care EveryWhere ID     This is your Care EveryWhere ID. This could be used by other organizations to access your [place] medical records  [id number]         Blood Pressure from Last 3 Encounters:   03/23/17 144/83   01/23/17 159/78   12/19/16 132/70    Weight from Last 3 Encounters:   03/23/17 102.3 kg (225 lb 9.6 oz)   02/16/17 101.2 kg (223 lb)   01/23/17 100.7 kg (222 lb)              Today, you had the following     No orders found for display       Primary Care Provider Office Phone # Fax #    Demarioel Daniel Irwin Wegener, [phone]       [facility] [address] [place] [id number]        Thank you!     Thank you for choosing [facility] O[facility]  for your care. Our goal is always to provide you with excellent care. Hearing back from our patients is one way we can continue to improve our services. Please take a few minutes to complete the written survey that you may receive in the mail after your visit with us. Thank you!             Your Updated Medication List - Protect others around you: Learn how to safely use, store and throw away your medicines at www.disposemymeds.org.          This list is accurate as of: 4/4/17  3:46 PM.  Always use your most recent med list.                   Brand Name Dispense Instructions for use    * ACE/ARB NOT PRESCRIBED (INTENTIONAL)      ACE & ARB not prescribed due to Allergy       ACURA BLOOD GLUCOSE METER W/DEVICE Kit     1 kit    1 Dose 2 times daily       albuterol 108 (90 BASE) MCG/ACT  Inhaler    PROAIR HFA/PROVENTIL HFA/VENTOLIN HFA    3 Inhaler    Inhale 2 puffs into the lungs every 6 hours as needed for shortness of breath / dyspnea or wheezing Ventolin or other covered brand       Alcohol Pads 70 % Pads     100 each    1 Box 4 times daily       baclofen 10 MG tablet    LIORESAL    180 tablet    Take 2 tablets (20 mg) by mouth 3 times daily       blood glucose monitoring lancets     1 Box    Use to test blood sugars 2 times daily or as directed.       blood glucose monitoring test strip    no brand specified    1 Box    Use to test blood sugars one time daily or as directed  Accucheck Richards plus test strips       * clindamycin 150 MG capsule    CLEOCIN    10 capsule    Open one capsule and place in 1 Litre of Normal Saline and mix. Irrigate with 20 cc each nostril QID.       * clindamycin 150 MG capsule    CLEOCIN    10 capsule    Open 1 cap and place in 1 litre of normal saline & mix. Irrigate with 20cc each nostril QID.       COMPRESSION STOCKINGS     2 each    Knee high compression socks 30-40-mm       hydrochlorothiazide 25 MG tablet    HYDRODIURIL    90 tablet    Take 1 tablet (25 mg) by mouth daily       insulin pen needle 32G X 4 MM    BD ROSE U/F    90 each    Use one daily as directed.  Bd ultra fine pen needles rose       LANTUS SOLOSTAR 100 UNIT/ML injection   Generic drug:  insulin glargine     15 mL    Inject 28 units under the skin daily.       LORazepam 0.5 MG tablet    ATIVAN    30 tablet    1-2 daily for muscle spasm as needed. #30/month       mupirocin 2 % ointment    BACTROBAN    2 g    Apply to anterior nares BID X 2 month supply       omeprazole 40 MG capsule    priLOSEC    90 capsule    Take 1 capsule (40 mg) by mouth daily Take 30-60 minutes before a meal.       * order for DME     1 Device    Equipment being ordered: BP cuff       order for DME     1 Device    Equipment being ordered: one donut for sitting       potassium chloride SA 20 MEQ CR tablet    potassium  chloride    180 tablet    Take 3 tablets (60 mEq) by mouth daily       promethazine 25 MG tablet    PHENERGAN    40 tablet    Take 1 tablet (25 mg) by mouth every 6 hours as needed for nausea       propranolol 20 MG tablet    INDERAL    180 tablet    Take 1 tablet (20 mg) by mouth 2 times daily       sertraline 100 MG tablet    ZOLOFT    180 tablet    Take 2 tablets (200 mg) by mouth daily       * sodium chloride 0.9 % Soln     00096 mL    Irrigate 20 cc each nostril QID X 2 month supply       * sodium chloride 0.9 % Soln     70017 mL    Irrigate 20 cc each nostril QID X 2 month supply       sodium chloride 0.9% (bottle) 0.9 % irrigation      IRRIGATE 20 ML  IN EACH NOSTRIL QID       Syringe (Disposable) 25 ML Misc     1 each    Syringe to be used for nasal irrigation       triamcinolone 0.1 % ointment    KENALOG    30 g    Apply sparingly to affected area three times daily for 14 days.       Urea 20 % Crea     480 g    Externally apply topically daily       * Notice:  This list has 6 medication(s) that are the same as other medications prescribed for you. Read the directions carefully, and ask your doctor or other care provider to review them with you.

## 2017-04-04 NOTE — PROGRESS NOTES
Patient comes to wound clinic for wound assessment per request of dr. Licona. He has history of a ulcer on the right fleshy buttock due to Neuropathic Ulcer: Pressure ulcer, stage II:  Located on right buttock near anus, still present. Not painful because patient lacks sensation below the waist 2/2 complication of back surgery in the past. He has noted bleeding and oozing from the ulcer, however it is now minimally draining.  Clean gloves are donned and current dressing removed. Previous treatment includes: moist Polymem  This is treatment week:5    :             Objective findings: slight improvement but maceration medially is new       Location: right buttock near anus non-tracking    Wound measured.: L 2 cm x, W 1.0 cm x, D 0.1 cm, Fullthickness.    Wound description: no sign of infection    Tenderness:no    Erythema surrounding the wound worrisome: no erythemia    Odor: none    Not inflamed.    Drainage is light, serosanguinous     Wound Base: Dry pale wound bed     Slough appearance: 0 %    Eschar appearance: none     Periwound Skin: intact, fibrous scarring maceration medially    Color: normal and consistent with surrounding tissue  Scabbed area noted on Coccyx recommended moisturizer. For now wait, watch and observe        Subjective finding:     Pt states: no improvement since last visit    Patient is assessed for discomfort which is: absent     Today's status of the wound: healing     Treatment: Removal of existing dressing, Visual inspection, Wound cleansed with dermal wound cleanser, Hycolloid dressing applied. Treated and follow-up appointment made for 4 days. Continue with current treatment but be carefull to place the dresing near the medial aspect of the wound.    Pt received the following instructions: Keep dressing on until next apt. Dr. Cunningham was available for supervision of care if needed or if questions should arise and regarding plan of care.Roland Chapin RN, CWON

## 2017-04-07 ENCOUNTER — OFFICE VISIT (OUTPATIENT)
Dept: WOUND CARE | Facility: CLINIC | Age: 57
End: 2017-04-07

## 2017-04-07 DIAGNOSIS — E87.6 HYPOKALEMIA: ICD-10-CM

## 2017-04-07 DIAGNOSIS — K70.30 ALCOHOLIC CIRRHOSIS OF LIVER WITHOUT ASCITES (H): ICD-10-CM

## 2017-04-07 DIAGNOSIS — S31.819A WOUND, OPEN, BUTTOCK, RIGHT, INITIAL ENCOUNTER: Primary | ICD-10-CM

## 2017-04-07 LAB
ALBUMIN SERPL-MCNC: 3.8 G/DL (ref 3.4–5)
ALP SERPL-CCNC: 124 U/L (ref 40–150)
ALT SERPL W P-5'-P-CCNC: 24 U/L (ref 0–70)
ANION GAP SERPL CALCULATED.3IONS-SCNC: 8 MMOL/L (ref 3–14)
AST SERPL W P-5'-P-CCNC: 19 U/L (ref 0–45)
BILIRUB DIRECT SERPL-MCNC: <0.1 MG/DL (ref 0–0.2)
BILIRUB SERPL-MCNC: 0.5 MG/DL (ref 0.2–1.3)
BUN SERPL-MCNC: 6 MG/DL (ref 7–30)
CALCIUM SERPL-MCNC: 8.8 MG/DL (ref 8.5–10.1)
CHLORIDE SERPL-SCNC: 105 MMOL/L (ref 94–109)
CO2 SERPL-SCNC: 28 MMOL/L (ref 20–32)
CREAT SERPL-MCNC: 0.69 MG/DL (ref 0.66–1.25)
ERYTHROCYTE [DISTWIDTH] IN BLOOD BY AUTOMATED COUNT: 15 % (ref 10–15)
GFR SERPL CREATININE-BSD FRML MDRD: ABNORMAL ML/MIN/1.7M2
GLUCOSE SERPL-MCNC: 91 MG/DL (ref 70–99)
HCT VFR BLD AUTO: 39.4 % (ref 40–53)
HGB BLD-MCNC: 12.7 G/DL (ref 13.3–17.7)
INR PPP: 1.11 (ref 0.86–1.14)
MCH RBC QN AUTO: 27.4 PG (ref 26.5–33)
MCHC RBC AUTO-ENTMCNC: 32.2 G/DL (ref 31.5–36.5)
MCV RBC AUTO: 85 FL (ref 78–100)
PLATELET # BLD AUTO: 207 10E9/L (ref 150–450)
POTASSIUM SERPL-SCNC: 3.5 MMOL/L (ref 3.4–5.3)
PROT SERPL-MCNC: 8.2 G/DL (ref 6.8–8.8)
RBC # BLD AUTO: 4.64 10E12/L (ref 4.4–5.9)
SODIUM SERPL-SCNC: 141 MMOL/L (ref 133–144)
WBC # BLD AUTO: 7.6 10E9/L (ref 4–11)

## 2017-04-07 NOTE — MR AVS SNAPSHOT
After Visit Summary   4/7/2017    Erwin Aguilar    MRN: [id number]           Patient Information     Date Of Birth          1960        Visit Information        Provider Department      4/7/2017 12:30 PM [facility] RN [facility]        Today's Diagnoses     Wound, open, buttock, right    -  1       Follow-ups after your visit        Your next 10 appointments already scheduled     Apr 11, 2017  8:20 AM CDT   (Arrive by 8:05 AM)   Return General Liver with Kimberly Ramirez MD    [facility] ([facility])    [address] [place] [address]  3rd [facility] [id number] [phone]            Apr 11, 2017  9:00 AM CDT   RETURN WOUND NURSE VISIT with Breanna Sahni RN   [facility] Wound Ostomy ([facility])    [address] [place] [address]  4th [facility] [id number] [phone]            Jun 22, 2017  8:30 AM CDT   (Arrive by 8:15 AM)   Return Visit with Victor M Anaya DPM   [facility] Wound Care ([facility])    [address] [place] [id number] [phone]              Who to contact     Please call your clinic at [phone] to:    Ask questions about your health    Make or cancel appointments    Discuss your medicines    Learn about your test results    Speak to your doctor   If you have compliments or concerns about an experience at your clinic, or if you wish to file a complaint, please contact [facility] Patient Relations at [phone] or email us at [email or web address]         Additional Information About Your Visit        MyChart Information     SAFCellhart gives you secure access to your electronic health record. If you see a primary care provider, you can also send messages to your care team and make appointments. If you have questions, please call your primary care clinic.  If you do not have a  primary care provider, please call [phone] and they will assist you.      Nutricate is an electronic gateway that provides easy, online access to your medical records. With Nut[employer]ate, you can request a clinic appointment, read your test results, renew a prescription or communicate with your care team.     To access your existing account, please contact your [facility] or call [phone] for assistance.        Care EveryWhere ID     This is your Care EveryWhere ID. This could be used by other organizations to access your [place] medical records  [id number]         Blood Pressure from Last 3 Encounters:   03/23/17 144/83   01/23/17 159/78   12/19/16 132/70    Weight from Last 3 Encounters:   03/23/17 102.3 kg (225 lb 9.6 oz)   02/16/17 101.2 kg (223 lb)   01/23/17 100.7 kg (222 lb)              Today, you had the following     No orders found for display       Primary Care Provider Office Phone # Fax #    Joel Daniel Irwin Wegener, [phone]       [facility] [address] [place] [address]        Thank you!     Thank you for choosing [facility] WOUND OSTOMY  for your care. Our goal is always to provide you with excellent care. Hearing back from our patients is one way we can continue to improve our services. Please take a few minutes to complete the written survey that you may receive in the mail after your visit with us. Thank you!             Your Updated Medication List - Protect others around you: Learn how to safely use, store and throw away your medicines at www.disposemymeds.org.          This list is accurate as of: 4/7/17  1:36 PM.  Always use your most recent med list.                   Brand Name Dispense Instructions for use    * ACE/ARB NOT PRESCRIBED (INTENTIONAL)      ACE & ARB not prescribed due to Allergy       ACURA BLOOD GLUCOSE METER W/DEVICE Kit     1 kit    1 Dose 2 times daily       albuterol 108 (90 BASE) MCG/ACT Inhaler     PROAIR HFA/PROVENTIL HFA/VENTOLIN HFA    3 Inhaler    Inhale 2 puffs into the lungs every 6 hours as needed for shortness of breath / dyspnea or wheezing Ventolin or other covered brand       Alcohol Pads 70 % Pads     100 each    1 Box 4 times daily       baclofen 10 MG tablet    LIORESAL    180 tablet    Take 2 tablets (20 mg) by mouth 3 times daily       blood glucose monitoring lancets     1 Box    Use to test blood sugars 2 times daily or as directed.       blood glucose monitoring test strip    no brand specified    1 Box    Use to test blood sugars one time daily or as directed  Accucheck Richards plus test strips       * clindamycin 150 MG capsule    CLEOCIN    10 capsule    Open one capsule and place in 1 Litre of Normal Saline and mix. Irrigate with 20 cc each nostril QID.       * clindamycin 150 MG capsule    CLEOCIN    10 capsule    Open 1 cap and place in 1 litre of normal saline & mix. Irrigate with 20cc each nostril QID.       COMPRESSION STOCKINGS     2 each    Knee high compression socks 30-40-mm       hydrochlorothiazide 25 MG tablet    HYDRODIURIL    90 tablet    Take 1 tablet (25 mg) by mouth daily       insulin pen needle 32G X 4 MM    BD ROSE U/F    90 each    Use one daily as directed.  Bd ultra fine pen needles rose       LANTUS SOLOSTAR 100 UNIT/ML injection   Generic drug:  insulin glargine     15 mL    Inject 28 units under the skin daily.       LORazepam 0.5 MG tablet    ATIVAN    30 tablet    1-2 daily for muscle spasm as needed. #30/month       mupirocin 2 % ointment    BACTROBAN    2 g    Apply to anterior nares BID X 2 month supply       omeprazole 40 MG capsule    priLOSEC    90 capsule    Take 1 capsule (40 mg) by mouth daily Take 30-60 minutes before a meal.       * order for DME     1 Device    Equipment being ordered: BP cuff       order for DME     1 Device    Equipment being ordered: one donut for sitting       potassium chloride SA 20 MEQ CR tablet    potassium chloride    180  tablet    Take 3 tablets (60 mEq) by mouth daily       promethazine 25 MG tablet    PHENERGAN    40 tablet    Take 1 tablet (25 mg) by mouth every 6 hours as needed for nausea       propranolol 20 MG tablet    INDERAL    180 tablet    Take 1 tablet (20 mg) by mouth 2 times daily       sertraline 100 MG tablet    ZOLOFT    180 tablet    Take 2 tablets (200 mg) by mouth daily       * sodium chloride 0.9 % Soln     82783 mL    Irrigate 20 cc each nostril QID X 2 month supply       * sodium chloride 0.9 % Soln     55345 mL    Irrigate 20 cc each nostril QID X 2 month supply       sodium chloride 0.9% (bottle) 0.9 % irrigation      IRRIGATE 20 ML  IN EACH NOSTRIL QID       Syringe (Disposable) 25 ML Misc     1 each    Syringe to be used for nasal irrigation       triamcinolone 0.1 % ointment    KENALOG    30 g    Apply sparingly to affected area three times daily for 14 days.       Urea 20 % Crea     480 g    Externally apply topically daily       * Notice:  This list has 6 medication(s) that are the same as other medications prescribed for you. Read the directions carefully, and ask your doctor or other care provider to review them with you.

## 2017-04-07 NOTE — PROGRESS NOTES
Patient comes to wound clinic for wound assessment per request of dr. Licona. He has history of a ulcer on the right fleshy buttock due to Neuropathic Ulcer: Pressure ulcer, stage II:Located on right buttock near anus, still present. Not painful because patient lacks sensation below the waist 2/2 complication of back surgery in the past. He has noted bleeding and oozing from the ulcer, however it is now minimally draining.Clean gloves are donned and current dressing removed. Previous treatment includes: moist Polymem    This is treatment week:5            Objective findings: slight improvement but maceration medially is new       Location: right buttock near anus non-tracking    Wound measured.: L 2 cm x, W 1.0 cm x, D 0.1 cm, Fullthickness.    Wound description: no sign of infection    Tenderness:no    Erythema surrounding the wound worrisome: no erythemia    Odor: none    Not inflamed.    Drainage is light, serosanguinous     Wound Base: Dry pale wound bed     Slough appearance: 0 %    Eschar appearance: none     Periwound Skin: intact, fibrous scarring maceration medially    Color: normal and consistent with surrounding tissue  Scabbed area noted on Coccyx recommended moisturizer. For now wait, watch and observe        Subjective finding:     Pt states: dressing came off shortly after getting home from last visit, went back to using former dressing.    Patient is assessed for discomfort which is: absent     Today's status of the wound: slight improvement     Treatment: Removal of existing dressing, Visual inspection, Wound and surround ing skin cleansed with Scrubcare, rinsed with NS, hydrocolloid dressing applied. Treated and follow-up appointment made for 4 days. Continue with current treatment but be carefull the dressing near the medial aspect of the wound.      Pt received the following instructions: Keep dressing on until next apt. Dr. Pressley was available for supervision of care if needed or if questions  should arise and regarding plan of care.Roland Chapin RN, CWON

## 2017-04-11 ENCOUNTER — OFFICE VISIT (OUTPATIENT)
Dept: GASTROENTEROLOGY | Facility: CLINIC | Age: 57
End: 2017-04-11
Attending: INTERNAL MEDICINE
Payer: MEDICARE

## 2017-04-11 VITALS
WEIGHT: 222 LBS | DIASTOLIC BLOOD PRESSURE: 83 MMHG | TEMPERATURE: 98.3 F | OXYGEN SATURATION: 99 % | HEIGHT: 70 IN | RESPIRATION RATE: 18 BRPM | SYSTOLIC BLOOD PRESSURE: 167 MMHG | HEART RATE: 65 BPM | BODY MASS INDEX: 31.78 KG/M2

## 2017-04-11 DIAGNOSIS — K70.30 ALCOHOLIC CIRRHOSIS OF LIVER WITHOUT ASCITES (H): Primary | ICD-10-CM

## 2017-04-11 PROCEDURE — 99213 OFFICE O/P EST LOW 20 MIN: CPT | Mod: ZF

## 2017-04-11 ASSESSMENT — PAIN SCALES - GENERAL: PAINLEVEL: NO PAIN (0)

## 2017-04-11 NOTE — NURSING NOTE
"Chief Complaint   Patient presents with     RECHECK     alcohol induced cirrhosis       Initial /83 (BP Location: Right arm, Patient Position: Chair, Cuff Size: Adult Large)  Pulse 65  Temp 98.3  F (36.8  C) (Oral)  Resp 18  Ht 1.778 m (5' 10\")  Wt 100.7 kg (222 lb)  SpO2 99%  BMI 31.85 kg/m2 Estimated body mass index is 31.85 kg/(m^2) as calculated from the following:    Height as of this encounter: 1.778 m (5' 10\").    Weight as of this encounter: 100.7 kg (222 lb).  Medication Reconciliation: complete    "

## 2017-04-11 NOTE — LETTER
4/11/2017      RE: Erwin Aguilar  [address]  [place] [address]       CHIEF COMPLAINT:  Reason for the visit is alcoholic cirrhosis.      HISTORY OF PRESENT ILLNESS:  Mr. Aguilar is  a 56-year-old  male whom we have seen here for alcoholic liver disease.  He also has problems with cauda equina syndrome and had back surgery.  He also had neurological bladder and bowel problems.  He also has diabetes.  When he was initially seen here for hematemesis and came to the ER, he did get an endoscopy where he was found to have esophageal varices that were banded.  Now as he has abstained from alcoholic beverages, he is doing quite well.  He does not have any significant edema or abdominal distention.  He has no confusion or memory issues.  He is moving his bowels well.  He is very careful with salt. He did not have any GI bleeds.  His weight has remained stable.  Now he is alcohol free over 3 years.  Mr. Aguilar also had in 05/2016 an upper endoscopy, and this did not show any esophageal varices.      Current Outpatient Prescriptions   Medication     sertraline (ZOLOFT) 100 MG tablet     Urea 20 % CREA     mupirocin (BACTROBAN) 2 % ointment     hydrochlorothiazide (HYDRODIURIL) 25 MG tablet     promethazine (PHENERGAN) 25 MG tablet     LORazepam (ATIVAN) 0.5 MG tablet     insulin glargine (LANTUS SOLOSTAR) 100 UNIT/ML injection     clindamycin (CLEOCIN) 150 MG capsule     order for DME     sodium chloride 0.9 % SOLN     propranolol (INDERAL) 20 MG tablet     blood glucose monitoring (NO BRAND SPECIFIED) test strip     albuterol (PROAIR HFA, PROVENTIL HFA, VENTOLIN HFA) 108 (90 BASE) MCG/ACT inhaler     insulin pen needle (BD ROSE U/F) 32G X 4 MM     Syringe, Disposable, 25 ML MISC     baclofen (LIORESAL) 10 MG tablet     potassium chloride SA (POTASSIUM CHLORIDE) 20 MEQ tablet     omeprazole (PRILOSEC) 40 MG capsule     Alcohol Swabs (ALCOHOL PADS) 70 % PADS     COMPRESSION STOCKINGS     blood glucose  monitoring (FREESTYLE) lancets     Blood Glucose Monitoring Suppl (ACURA BLOOD GLUCOSE METER) W/DEVICE KIT     ORDER FOR DME     ACE/ARB NOT PRESCRIBED, INTENTIONAL,     No current facility-administered medications for this visit.        PHYSICAL EXAMINATION:   VITAL SIGNS:  As of today, 04/11/2017, blood pressure is 167/83, temperature is 98.3, pulse is 65, respirations 18, height is 1.778 meters, weight is 100.7 kg.  BMI is 31.92.   HEENT:  Eyes are not icteric.  Mucosa moist.   NECK:  Supple, no lymphadenopathy.   CHEST:  Clear to auscultation.   ABDOMEN:  Obese but no shifting dullness noted.   EXTREMITIES:  No edema.   CENTRAL NERVOUS SYSTEM:  Alert and oriented to place, person and time.  No asterixis noted.      IMPRESSION AND PLAN:  Mr. Aguilar has alcoholic liver disease.  He had previously significant portal hypertension.  In fact, he presented with an esophageal bleed.  He did abstain from alcoholic beverages and did quite well since then.  He has clinically stabilized.  Plan will be to continue doing surveillance for HCC.  He will have also his upper endoscopy done on a yearly basis, and we will get his MELD labs regularly.  He is doing quite well.  His labs are quasi normal, as I have said in my letter to him, and he will be seen here and followed within the coming 6 months at which time he will have another image.      This was a 25-minute visit of which more than 50% was spent in explaining to the patient what our plan of care was.  We answered all his questions.      Kimberly Ramirez MD    cc:   Estrella Bowman NP

## 2017-04-11 NOTE — PROGRESS NOTES
CHIEF COMPLAINT:  Reason for the visit is alcoholic cirrhosis.      HISTORY OF PRESENT ILLNESS:  Mr. Aguilar is  a 56-year-old  male whom we have seen here for alcoholic liver disease.  He also has problems with cauda equina syndrome and had back surgery.  He also had neurological bladder and bowel problems.  He also has diabetes.  When he was initially seen here for hematemesis and came to the ER, he did get an endoscopy where he was found to have esophageal varices that were banded.  Now as he has abstained from alcoholic beverages, he is doing quite well.  He does not have any significant edema or abdominal distention.  He has no confusion or memory issues.  He is moving his bowels well.  He is very careful with salt. He did not have any GI bleeds.  His weight has remained stable.  Now he is alcohol free over 3 years.  Mr. Aguilar also had in 05/2016 an upper endoscopy, and this did not show any esophageal varices.      Current Outpatient Prescriptions   Medication     sertraline (ZOLOFT) 100 MG tablet     Urea 20 % CREA     mupirocin (BACTROBAN) 2 % ointment     hydrochlorothiazide (HYDRODIURIL) 25 MG tablet     promethazine (PHENERGAN) 25 MG tablet     LORazepam (ATIVAN) 0.5 MG tablet     insulin glargine (LANTUS SOLOSTAR) 100 UNIT/ML injection     clindamycin (CLEOCIN) 150 MG capsule     order for DME     sodium chloride 0.9 % SOLN     propranolol (INDERAL) 20 MG tablet     blood glucose monitoring (NO BRAND SPECIFIED) test strip     albuterol (PROAIR HFA, PROVENTIL HFA, VENTOLIN HFA) 108 (90 BASE) MCG/ACT inhaler     insulin pen needle (BD ROSE U/F) 32G X 4 MM     Syringe, Disposable, 25 ML MISC     baclofen (LIORESAL) 10 MG tablet     potassium chloride SA (POTASSIUM CHLORIDE) 20 MEQ tablet     omeprazole (PRILOSEC) 40 MG capsule     Alcohol Swabs (ALCOHOL PADS) 70 % PADS     COMPRESSION STOCKINGS     blood glucose monitoring (FREESTYLE) lancets     Blood Glucose Monitoring Suppl (ACURA BLOOD GLUCOSE  METER) W/DEVICE KIT     ORDER FOR DME     ACE/ARB NOT PRESCRIBED, INTENTIONAL,     No current facility-administered medications for this visit.        PHYSICAL EXAMINATION:   VITAL SIGNS:  As of today, 04/11/2017, blood pressure is 167/83, temperature is 98.3, pulse is 65, respirations 18, height is 1.778 meters, weight is 100.7 kg.  BMI is 31.92.   HEENT:  Eyes are not icteric.  Mucosa moist.   NECK:  Supple, no lymphadenopathy.   CHEST:  Clear to auscultation.   ABDOMEN:  Obese but no shifting dullness noted.   EXTREMITIES:  No edema.   CENTRAL NERVOUS SYSTEM:  Alert and oriented to place, person and time.  No asterixis noted.      IMPRESSION AND PLAN:  Mr. Aguilar has alcoholic liver disease.  He had previously significant portal hypertension.  In fact, he presented with an esophageal bleed.  He did abstain from alcoholic beverages and did quite well since then.  He has clinically stabilized.  Plan will be to continue doing surveillance for HCC.  He will have also his upper endoscopy done on a yearly basis, and we will get his MELD labs regularly.  He is doing quite well.  His labs are quasi normal, as I have said in my letter to him, and he will be seen here and followed within the coming 6 months at which time he will have another image.      This was a 25-minute visit of which more than 50% was spent in explaining to the patient what our plan of care was.  We answered all his questions.      cc:   Estrella Bowman NP

## 2017-04-11 NOTE — MR AVS SNAPSHOT
After Visit Summary   4/11/2017    Erwin Aguilar    MRN: [id number]           Patient Information     Date Of Birth          1960        Visit Information        Provider Department      4/11/2017 8:20 AM Kimberly Ramirez MD [facility] Hepatology        Today's Diagnoses     Alcoholic cirrhosis of liver without ascites (H)    -  1       Follow-ups after your visit        Follow-up notes from your care team     Return in about 6 months (around 10/11/2017).      Your next 10 appointments already scheduled     Apr 19, 2017  8:30 AM CDT   RETURN WOUND NURSE VISIT with  Wound Nurse, RN   [facility] Wound Ostomy ([facility])    [address] [place] [id number] [phone]            Jun 22, 2017  8:30 AM CDT   (Arrive by 8:15 AM)   Return Visit with Victor M Anaya DPM   [facility] Wound Care ([facility])    [address] [place] [id number] [phone]              Who to contact     If you have questions or need follow up information about today's clinic visit or your schedule please contact [facility] HEPATOLOGY directly at [phone].  Normal or non-critical lab and imaging results will be communicated to you by MyChart, letter or phone within 4 business days after the clinic has received the results. If you do not hear from us within 7 days, please contact the clinic through Medityplushart or phone. If you have a critical or abnormal lab result, we will notify you by phone as soon as possible.  Submit refill requests through Ge[employer] or call your pharmacy and they will forward the refill request to us. Please allow 3 business days for your refill to be completed.          Additional Information About Your Visit        Medityplushart Information     [employer] gives you secure access to your electronic health record. If you see a primary care provider, you can also send messages to your care  "team and make appointments. If you have questions, please call your primary care clinic.  If you do not have a primary care provider, please call [phone] and they will assist you.        Care EveryWhere ID     This is your Care EveryWhere ID. This could be used by other organizations to access your [place] medical records  [id number]        Your Vitals Were     Pulse Temperature Respirations Height Pulse Oximetry BMI (Body Mass Index)    65 98.3  F (36.8  C) (Oral) 18 1.778 m (5' 10\") 99% 31.85 kg/m2       Blood Pressure from Last 3 Encounters:   04/11/17 167/83   03/23/17 144/83   01/23/17 159/78    Weight from Last 3 Encounters:   04/11/17 100.7 kg (222 lb)   03/23/17 102.3 kg (225 lb 9.6 oz)   02/16/17 101.2 kg (223 lb)              Today, you had the following     No orders found for display         Today's Medication Changes          These changes are accurate as of: 4/11/17 11:59 PM.  If you have any questions, ask your nurse or doctor.               These medicines have changed or have updated prescriptions.        Dose/Directions    potassium chloride SA 20 MEQ CR tablet   Commonly known as:  potassium chloride   This may have changed:  how much to take   Used for:  Hypokalemia        Dose:  60 mEq   Take 3 tablets (60 mEq) by mouth daily   Quantity:  180 tablet   Refills:  3         Stop taking these medicines if you haven't already. Please contact your care team if you have questions.     triamcinolone 0.1 % ointment   Commonly known as:  KENALOG   Stopped by:  Kimberly Ramirez MD                    Primary Care Provider Office Phone # Fax #    Joel Daniel Irwin Wegener, [phone]       [facility] [address] [place] [id number]        Thank you!     Thank you for choosing [facility] HEPATOLOGY  for your care. Our goal is always to provide you with excellent care. Hearing back from our patients is one way we can continue to improve our services. Please take " a few minutes to complete the written survey that you may receive in the mail after your visit with us. Thank you!             Your Updated Medication List - Protect others around you: Learn how to safely use, store and throw away your medicines at www.disposemymeds.org.          This list is accurate as of: 4/11/17 11:59 PM.  Always use your most recent med list.                   Brand Name Dispense Instructions for use    * ACE/ARB NOT PRESCRIBED (INTENTIONAL)      ACE & ARB not prescribed due to Allergy       ACURA BLOOD GLUCOSE METER W/DEVICE Kit     1 kit    1 Dose 2 times daily       albuterol 108 (90 BASE) MCG/ACT Inhaler    PROAIR HFA/PROVENTIL HFA/VENTOLIN HFA    3 Inhaler    Inhale 2 puffs into the lungs every 6 hours as needed for shortness of breath / dyspnea or wheezing Ventolin or other covered brand       Alcohol Pads 70 % Pads     100 each    1 Box 4 times daily       baclofen 10 MG tablet    LIORESAL    180 tablet    Take 2 tablets (20 mg) by mouth 3 times daily       blood glucose monitoring lancets     1 Box    Use to test blood sugars 2 times daily or as directed.       blood glucose monitoring test strip    no brand specified    1 Box    Use to test blood sugars one time daily or as directed  Accucheck Richards plus test strips       clindamycin 150 MG capsule    CLEOCIN    10 capsule    Open 1 cap and place in 1 litre of normal saline & mix. Irrigate with 20cc each nostril QID.       COMPRESSION STOCKINGS     2 each    Knee high compression socks 30-40-mm       hydrochlorothiazide 25 MG tablet    HYDRODIURIL    90 tablet    Take 1 tablet (25 mg) by mouth daily       insulin pen needle 32G X 4 MM    BD ROSE U/F    90 each    Use one daily as directed.  Bd ultra fine pen needles rose       LANTUS SOLOSTAR 100 UNIT/ML injection   Generic drug:  insulin glargine     15 mL    Inject 28 units under the skin daily.       LORazepam 0.5 MG tablet    ATIVAN    30 tablet    1-2 daily for muscle spasm as  needed. #30/month       mupirocin 2 % ointment    BACTROBAN    2 g    Apply to anterior nares BID X 2 month supply       omeprazole 40 MG capsule    priLOSEC    90 capsule    Take 1 capsule (40 mg) by mouth daily Take 30-60 minutes before a meal.       * order for DME     1 Device    Equipment being ordered: BP cuff       order for DME     1 Device    Equipment being ordered: one donut for sitting       potassium chloride SA 20 MEQ CR tablet    potassium chloride    180 tablet    Take 3 tablets (60 mEq) by mouth daily       promethazine 25 MG tablet    PHENERGAN    40 tablet    Take 1 tablet (25 mg) by mouth every 6 hours as needed for nausea       propranolol 20 MG tablet    INDERAL    180 tablet    Take 1 tablet (20 mg) by mouth 2 times daily       sertraline 100 MG tablet    ZOLOFT    180 tablet    Take 2 tablets (200 mg) by mouth daily       sodium chloride 0.9 % Soln     41068 mL    Irrigate 20 cc each nostril QID X 2 month supply       Syringe (Disposable) 25 ML Misc     1 each    Syringe to be used for nasal irrigation       Urea 20 % Crea     480 g    Externally apply topically daily       * Notice:  This list has 2 medication(s) that are the same as other medications prescribed for you. Read the directions carefully, and ask your doctor or other care provider to review them with you.

## 2017-04-18 ENCOUNTER — TELEPHONE (OUTPATIENT)
Dept: FAMILY MEDICINE | Facility: CLINIC | Age: 57
End: 2017-04-18

## 2017-04-18 NOTE — TELEPHONE ENCOUNTER
I have attempted to contact this patient by phone with the following results: left message to return my call on answering machine.    Please have Pt make an appointment to follow up with diabetes and hypertension.    Per-Dr. Wegener's  Diabetes list     Ginger Epstein MA

## 2017-04-19 DIAGNOSIS — J32.9 CHRONIC SINUSITIS, UNSPECIFIED LOCATION: ICD-10-CM

## 2017-04-20 ENCOUNTER — OFFICE VISIT (OUTPATIENT)
Dept: WOUND CARE | Facility: CLINIC | Age: 57
End: 2017-04-20

## 2017-04-20 DIAGNOSIS — S31.819A WOUND, OPEN, BUTTOCK, RIGHT, INITIAL ENCOUNTER: Primary | ICD-10-CM

## 2017-04-20 NOTE — PROGRESS NOTES
"  Patient comes to wound clinic for wound assessment per request of dr. Licona. He has history of a ulcer on the right fleshy buttock due to Neuropathic Ulcer: Pressure ulcer, stage II:Located on right buttock near anus, still present. Not painful because patient lacks sensation below the waist 2/2 complication of back surgery in the past. He has noted bleeding and oozing from the ulcer, however it is now minimally draining.    Clean gloves are donned and current dressing removed. Previous treatment includes: Hydrocolloid  This is treatment week:7      Objective findings: slight improvement but new wound at coccyx       Location: right buttock near anus non-tracking now just appears to be a fissure    Previous Scabbed area noted on Coccyx recommended moisturizer now a new wound: 1 cm x 1.5 cm x 0.2 cm Appears to be Possible Pressure ulcer stg 2 but pt is not bed bound so I\"m not conviced it is this    Wound: Full thickness,     Wound description: no sign of infection    Tenderness: no    Drainage is light, serosanguinous     Wound Base: Dry pale wound bed     Periwound Skin: intact, fibrous scarring maceration medially    Color: normal and consistent with surrounding tissue                 Subjective finding:     Pt states: Dressing stayed on for two day, then he replaced it.     Patient is assessed for discomfort which is: absent     Today's status of the wound: one is healing the other deteriorating possible Stg to PU vs blister that is unroofed?     Treatment: Removal of existing dressing, Visual inspection, Wound cleansed with dermal wound cleanser, Hycolloid dressing applied to both wounds. Treated and follow-up appointment made for 1 week. Continue with current treatment but be carefull to place the dresing near the medial aspect of the wound.      Pt received the following instructions: Keep dressing on until next apt. Encouraged decreased sitting. Pt should lay on his sides or be up Dr. Anaya was " available for supervision of care if needed or if questions should arise and regarding plan of care. Breanna Sahni   RN, CWON

## 2017-04-20 NOTE — MR AVS SNAPSHOT
After Visit Summary   4/20/2017    Erwin Aguilar    MRN: [id number]           Patient Information     Date Of Birth          1960        Visit Information        Provider Department      4/20/2017 9:00 AM Nurse, Brian Wound, RN [facility] Wound Ostomy        Today's Diagnoses     Wound, open, buttock, right    -  1       Follow-ups after your visit        Your next 10 appointments already scheduled     Apr 27, 2017  9:30 AM CDT   RETURN WOUND NURSE VISIT with Breanna Sahni RN   [facility] Wound Ostomy ([facility])    [address] [place] [id number] [phone]            Jun 22, 2017  8:30 AM CDT   (Arrive by 8:15 AM)   Return Visit with Victor M Anaya DPM   [facility] Wound Care ([facility])    [address] [place] [id number] [phone]            Oct 03, 2017  8:15 AM CDT   Lab with BRIAN LAB    Health Lab ([facility])    [address] [place] [id number] [phone]            Oct 03, 2017  9:20 AM CDT   (Arrive by 9:05 AM)   Return General Liver with Kimberly Ramirez MD   [facility] Hepatology ([facility])    [address] [place] [id number] [phone]              Who to contact     Please call your clinic at [phone] to:    Ask questions about your health    Make or cancel appointments    Discuss your medicines    Learn about your test results    Speak to your doctor   If you have compliments or concerns about an experience at your clinic, or if you wish to file a complaint, please contact [facility] Patient Relations at [phone] or email us at [email or web address]         Additional Information About Your Visit        MyChart Information     MyChart gives you secure access to your electronic health record. If you see  a primary care provider, you can also send messages to your care team and make appointments. If you have questions, please call your primary care clinic.  If you do not have a primary care provider, please call [phone] and they will assist you.      [employer] is an electronic gateway that provides easy, online access to your medical records. With [employer], you can request a clinic appointment, read your test results, renew a prescription or communicate with your care team.     To access your existing account, please contact your [facility] or call [phone] for assistance.        Care EveryWhere ID     This is your Care EveryWhere ID. This could be used by other organizations to access your [place] medical records  [id number]         Blood Pressure from Last 3 Encounters:   04/11/17 167/83   03/23/17 144/83   01/23/17 159/78    Weight from Last 3 Encounters:   04/11/17 100.7 kg (222 lb)   03/23/17 102.3 kg (225 lb 9.6 oz)   02/16/17 101.2 kg (223 lb)              Today, you had the following     No orders found for display         Today's Medication Changes          These changes are accurate as of: 4/20/17  9:24 AM.  If you have any questions, ask your nurse or doctor.               These medicines have changed or have updated prescriptions.        Dose/Directions    potassium chloride SA 20 MEQ CR tablet   Commonly known as:  potassium chloride   This may have changed:  how much to take   Used for:  Hypokalemia        Dose:  60 mEq   Take 3 tablets (60 mEq) by mouth daily   Quantity:  180 tablet   Refills:  3                Primary Care Provider Office Phone # Fax #    Joel Daniel Irwin Wegener, [phone]       [facility] [address] [place] [id number]        Thank you!     Thank you for choosing [facility] WOUND OSTOMY  for your care. Our goal is always to provide you with excellent care. Hearing back from our patients is one way we can continue  to improve our services. Please take a few minutes to complete the written survey that you may receive in the mail after your visit with us. Thank you!             Your Updated Medication List - Protect others around you: Learn how to safely use, store and throw away your medicines at www.disposemymeds.org.          This list is accurate as of: 4/20/17  9:24 AM.  Always use your most recent med list.                   Brand Name Dispense Instructions for use    * ACE/ARB NOT PRESCRIBED (INTENTIONAL)      ACE & ARB not prescribed due to Allergy       ACURA BLOOD GLUCOSE METER W/DEVICE Kit     1 kit    1 Dose 2 times daily       albuterol 108 (90 BASE) MCG/ACT Inhaler    PROAIR HFA/PROVENTIL HFA/VENTOLIN HFA    3 Inhaler    Inhale 2 puffs into the lungs every 6 hours as needed for shortness of breath / dyspnea or wheezing Ventolin or other covered brand       Alcohol Pads 70 % Pads     100 each    1 Box 4 times daily       baclofen 10 MG tablet    LIORESAL    180 tablet    Take 2 tablets (20 mg) by mouth 3 times daily       blood glucose monitoring lancets     1 Box    Use to test blood sugars 2 times daily or as directed.       blood glucose monitoring test strip    no brand specified    1 Box    Use to test blood sugars one time daily or as directed  Accucheck Richards plus test strips       clindamycin 150 MG capsule    CLEOCIN    10 capsule    Open 1 cap and place in 1 litre of normal saline & mix. Irrigate with 20cc each nostril QID.       COMPRESSION STOCKINGS     2 each    Knee high compression socks 30-40-mm       hydrochlorothiazide 25 MG tablet    HYDRODIURIL    90 tablet    Take 1 tablet (25 mg) by mouth daily       insulin pen needle 32G X 4 MM    BD ROSE U/F    90 each    Use one daily as directed.  Bd ultra fine pen needles rose       LANTUS SOLOSTAR 100 UNIT/ML injection   Generic drug:  insulin glargine     15 mL    Inject 28 units under the skin daily.       LORazepam 0.5 MG tablet    ATIVAN    30 tablet     1-2 daily for muscle spasm as needed. #30/month       mupirocin 2 % ointment    BACTROBAN    2 g    Apply to anterior nares BID X 2 month supply       omeprazole 40 MG capsule    priLOSEC    90 capsule    Take 1 capsule (40 mg) by mouth daily Take 30-60 minutes before a meal.       * order for DME     1 Device    Equipment being ordered: BP cuff       order for DME     1 Device    Equipment being ordered: one donut for sitting       potassium chloride SA 20 MEQ CR tablet    potassium chloride    180 tablet    Take 3 tablets (60 mEq) by mouth daily       promethazine 25 MG tablet    PHENERGAN    40 tablet    Take 1 tablet (25 mg) by mouth every 6 hours as needed for nausea       propranolol 20 MG tablet    INDERAL    180 tablet    Take 1 tablet (20 mg) by mouth 2 times daily       sertraline 100 MG tablet    ZOLOFT    180 tablet    Take 2 tablets (200 mg) by mouth daily       sodium chloride 0.9 % Soln     93613 mL    Irrigate 20 cc each nostril QID X 2 month supply       Syringe (Disposable) 25 ML Misc     1 each    Syringe to be used for nasal irrigation       Urea 20 % Crea cream     480 g    Externally apply topically daily       * Notice:  This list has 2 medication(s) that are the same as other medications prescribed for you. Read the directions carefully, and ask your doctor or other care provider to review them with you.

## 2017-04-21 RX ORDER — CLINDAMYCIN HCL 150 MG
CAPSULE ORAL
Qty: 10 CAPSULE | Refills: 2 | Status: SHIPPED | OUTPATIENT
Start: 2017-04-21 | End: 2017-10-03

## 2017-04-27 ENCOUNTER — OFFICE VISIT (OUTPATIENT)
Dept: WOUND CARE | Facility: CLINIC | Age: 57
End: 2017-04-27

## 2017-04-27 DIAGNOSIS — S31.819A WOUND, OPEN, BUTTOCK, RIGHT, INITIAL ENCOUNTER: Primary | ICD-10-CM

## 2017-04-27 NOTE — PROGRESS NOTES
Patient comes to wound clinic for wound assessment per request of dr. Licona. He has history of a ulcer on the right fleshy buttock due to Neuropathic Ulcer: Pressure ulcer, stage II:Located on right buttock near anus, still present. Not painful because patient lacks sensation below the waist 2/2 complication of back surgery in the past. He has noted bleeding and oozing from the ulcer, however it is now minimally draining.    Clean gloves are donned and current dressing removed. Previous treatment includes: Hydrocolloid  This is treatment week:8      Objective findings:        Location: right buttock near anus non-tracking looks like a  fissure    Previous Scabbed area noted on Coccyx recommended moisturizer now a new wound: 1 cm x 1.5 cm x 0.2 cm Unlikely to be a  Pressure ulcer stg 2 but pt is not bed bound so etiology is unknown    Wound: Full thickness,     Wound description: no sign of infection    Tenderness: no    Drainage is light, serosanguinous     Wound Base: Dry pale wound bed     Periwound Skin: intact, fibrous scarring maceration medially    Color: normal and consistent with surrounding tissue             Subjective finding:     Pt states: Comfeel  Falls of in 8 hours    Patient is assessed for discomfort which is: absent     Today's status of the wound: stable but stalled wounds     Treatment: Removal of existing dressing, Visual inspection, Wound cleansed with dermal wound cleanser. Ronny in treatment plan: Polymem with Normogel applied to both wounds. Treated and follow-up appointment made for 2 week. Continue with current treatment but be carefull to place the dresing near the medial aspect of the wound.      Pt received the following instructions: Keep dressing on until next apt. Encouraged decreased sitting. Pt should lay on his sides or be up Dr. Anaya was available for supervision of care if needed or if questions should arise and regarding plan of care. Breanna Sahni RN, CWON

## 2017-04-27 NOTE — MR AVS SNAPSHOT
After Visit Summary   4/27/2017    Erwin Aguilar    MRN: [id number]           Patient Information     Date Of Birth          1960        Visit Information        Provider Department      4/27/2017 9:30 AM Breanna Sahni RN  Health Wound Ostomy        Today's Diagnoses     Wound, open, buttock, right    -  1       Follow-ups after your visit        Your next 10 appointments already scheduled     May 11, 2017  8:30 AM CDT   RETURN WOUND NURSE VISIT with Breanna Sahni RN   [facility] ([facility])    [address] [place] [address]  4th [facility] [id number] [phone]            Jun 22, 2017  8:30 AM CDT   (Arrive by 8:15 AM)   Return Visit with Victor M Anaya DPM   [facility] ([facility])    [address] [place] [id number] [phone]            Oct 03, 2017  8:15 AM CDT   Lab with  LAB   [facility] ([facility])    [address] [place] [id number] [phone]            Oct 03, 2017  9:20 AM CDT   (Arrive by 9:05 AM)   Return General Liver with Kimberly Ramirez MD   [facility] ([facility])    [address] [place] [id number] [phone]              Who to contact     Please call your clinic at [phone] to:    Ask questions about your health    Make or cancel appointments    Discuss your medicines    Learn about your test results    Speak to your doctor   If you have compliments or concerns about an experience at your clinic, or if you wish to file a complaint, please contact [facility] Patient Relations at [phone] or email us at [email or web address]         Additional Information About Your Visit        MyChart Information     MyChart gives you secure access to your electronic health record. If you  see a primary care provider, you can also send messages to your care team and make appointments. If you have questions, please call your primary care clinic.  If you do not have a primary care provider, please call [phone] and they will assist you.      [employer] is an electronic gateway that provides easy, online access to your medical records. With [employer], you can request a clinic appointment, read your test results, renew a prescription or communicate with your care team.     To access your existing account, please contact your [facility] or call [phone] for assistance.        Care EveryWhere ID     This is your Care EveryWhere ID. This could be used by other organizations to access your [place] medical records  [id number]         Blood Pressure from Last 3 Encounters:   04/11/17 167/83   03/23/17 144/83   01/23/17 159/78    Weight from Last 3 Encounters:   04/11/17 100.7 kg (222 lb)   03/23/17 102.3 kg (225 lb 9.6 oz)   02/16/17 101.2 kg (223 lb)              Today, you had the following     No orders found for display         Today's Medication Changes          These changes are accurate as of: 4/27/17  9:55 AM.  If you have any questions, ask your nurse or doctor.               These medicines have changed or have updated prescriptions.        Dose/Directions    potassium chloride SA 20 MEQ CR tablet   Commonly known as:  potassium chloride   This may have changed:  how much to take   Used for:  Hypokalemia        Dose:  60 mEq   Take 3 tablets (60 mEq) by mouth daily   Quantity:  180 tablet   Refills:  3                Primary Care Provider Office Phone # Fax #    Joel Daniel Irwin Wegener, [phone]       [facility] [address] [place] [address]        Thank you!     Thank you for choosing [facility] WOUND OSTOMY  for your care. Our goal is always to provide you with excellent care. Hearing back from our patients is one way we can  continue to improve our services. Please take a few minutes to complete the written survey that you may receive in the mail after your visit with us. Thank you!             Your Updated Medication List - Protect others around you: Learn how to safely use, store and throw away your medicines at www.disposemymeds.org.          This list is accurate as of: 4/27/17  9:55 AM.  Always use your most recent med list.                   Brand Name Dispense Instructions for use    * ACE/ARB NOT PRESCRIBED (INTENTIONAL)      ACE & ARB not prescribed due to Allergy       ACURA BLOOD GLUCOSE METER W/DEVICE Kit     1 kit    1 Dose 2 times daily       albuterol 108 (90 BASE) MCG/ACT Inhaler    PROAIR HFA/PROVENTIL HFA/VENTOLIN HFA    3 Inhaler    Inhale 2 puffs into the lungs every 6 hours as needed for shortness of breath / dyspnea or wheezing Ventolin or other covered brand       Alcohol Pads 70 % Pads     100 each    1 Box 4 times daily       baclofen 10 MG tablet    LIORESAL    180 tablet    Take 2 tablets (20 mg) by mouth 3 times daily       blood glucose monitoring lancets     1 Box    Use to test blood sugars 2 times daily or as directed.       blood glucose monitoring test strip    no brand specified    1 Box    Use to test blood sugars one time daily or as directed  Accucheck Richards plus test strips       clindamycin 150 MG capsule    CLEOCIN    10 capsule    OPEN 1 CAP AND PLACE IN 1 LITER OF NORMAL SALINE & MIX. IRRIGATE WITH 20ML EACH NOSTRIL 4 TIMES/DAY       COMPRESSION STOCKINGS     2 each    Knee high compression socks 30-40-mm       hydrochlorothiazide 25 MG tablet    HYDRODIURIL    90 tablet    Take 1 tablet (25 mg) by mouth daily       insulin pen needle 32G X 4 MM    BD ROSE U/F    90 each    Use one daily as directed.  Bd ultra fine pen needles rose       LANTUS SOLOSTAR 100 UNIT/ML injection   Generic drug:  insulin glargine     15 mL    Inject 28 units under the skin daily.       LORazepam 0.5 MG tablet     ATIVAN    30 tablet    1-2 daily for muscle spasm as needed. #30/month       mupirocin 2 % ointment    BACTROBAN    2 g    Apply to anterior nares BID X 2 month supply       omeprazole 40 MG capsule    priLOSEC    90 capsule    Take 1 capsule (40 mg) by mouth daily Take 30-60 minutes before a meal.       * order for DME     1 Device    Equipment being ordered: BP cuff       order for DME     1 Device    Equipment being ordered: one donut for sitting       potassium chloride SA 20 MEQ CR tablet    potassium chloride    180 tablet    Take 3 tablets (60 mEq) by mouth daily       promethazine 25 MG tablet    PHENERGAN    40 tablet    Take 1 tablet (25 mg) by mouth every 6 hours as needed for nausea       propranolol 20 MG tablet    INDERAL    180 tablet    Take 1 tablet (20 mg) by mouth 2 times daily       sertraline 100 MG tablet    ZOLOFT    180 tablet    Take 2 tablets (200 mg) by mouth daily       sodium chloride 0.9 % Soln     57665 mL    Irrigate 20 cc each nostril QID X 2 month supply       Syringe (Disposable) 25 ML Misc     1 each    Syringe to be used for nasal irrigation       Urea 20 % Crea cream     480 g    Externally apply topically daily       * Notice:  This list has 2 medication(s) that are the same as other medications prescribed for you. Read the directions carefully, and ask your doctor or other care provider to review them with you.

## 2017-05-09 ENCOUNTER — MYC MEDICAL ADVICE (OUTPATIENT)
Dept: FAMILY MEDICINE | Facility: CLINIC | Age: 57
End: 2017-05-09

## 2017-05-09 ENCOUNTER — OFFICE VISIT (OUTPATIENT)
Dept: FAMILY MEDICINE | Facility: CLINIC | Age: 57
End: 2017-05-09
Payer: MEDICARE

## 2017-05-09 VITALS
OXYGEN SATURATION: 97 % | HEART RATE: 69 BPM | TEMPERATURE: 97.3 F | BODY MASS INDEX: 34.15 KG/M2 | SYSTOLIC BLOOD PRESSURE: 136 MMHG | DIASTOLIC BLOOD PRESSURE: 78 MMHG | WEIGHT: 238 LBS

## 2017-05-09 DIAGNOSIS — I85.10 ESOPHAGEAL VARICES IN CIRRHOSIS (H): ICD-10-CM

## 2017-05-09 DIAGNOSIS — Z79.4 TYPE 2 DIABETES MELLITUS WITH DIABETIC POLYNEUROPATHY, WITH LONG-TERM CURRENT USE OF INSULIN (H): ICD-10-CM

## 2017-05-09 DIAGNOSIS — I10 HYPERTENSION GOAL BP (BLOOD PRESSURE) < 140/90: Primary | ICD-10-CM

## 2017-05-09 DIAGNOSIS — E78.5 HYPERLIPIDEMIA LDL GOAL <100: ICD-10-CM

## 2017-05-09 DIAGNOSIS — K74.60 ESOPHAGEAL VARICES IN CIRRHOSIS (H): ICD-10-CM

## 2017-05-09 DIAGNOSIS — E11.42 TYPE 2 DIABETES MELLITUS WITH DIABETIC POLYNEUROPATHY, WITH LONG-TERM CURRENT USE OF INSULIN (H): ICD-10-CM

## 2017-05-09 DIAGNOSIS — G89.4 CHRONIC PAIN SYNDROME: ICD-10-CM

## 2017-05-09 DIAGNOSIS — R79.89 ABNORMAL LIVER FUNCTION TESTS: ICD-10-CM

## 2017-05-09 DIAGNOSIS — S31.819A WOUND, OPEN, BUTTOCK, RIGHT, INITIAL ENCOUNTER: ICD-10-CM

## 2017-05-09 PROCEDURE — 99213 OFFICE O/P EST LOW 20 MIN: CPT | Performed by: FAMILY MEDICINE

## 2017-05-09 RX ORDER — PROPRANOLOL HCL 60 MG
60 CAPSULE, EXTENDED RELEASE 24HR ORAL DAILY
Qty: 90 CAPSULE | Refills: 3 | Status: SHIPPED | OUTPATIENT
Start: 2017-05-09 | End: 2017-05-09

## 2017-05-09 RX ORDER — PROPRANOLOL HCL 60 MG
60 CAPSULE, EXTENDED RELEASE 24HR ORAL DAILY
Qty: 90 CAPSULE | Refills: 3 | Status: SHIPPED | OUTPATIENT
Start: 2017-05-09 | End: 2017-05-10

## 2017-05-09 NOTE — LETTER
[place] [id number] [place] [id number] [phone]        November 15, 2017    Erwin Aguilar  [address]  [place] [address]              Dear Erwin Aguilar    This is to remind you that your non-fasting lab (urine) is due.    You may call our office at [phone] to schedule an appointment.    Please disregard this notice if you have already had your labs drawn or made an appointment.        Sincerely,        Joel Daniel Irwin Wegener MD

## 2017-05-09 NOTE — NURSING NOTE
"Chief Complaint   Patient presents with     Blood Draw     A1C     Hypertension       Initial /82 (BP Location: Left arm, Patient Position: Chair, Cuff Size: Adult Regular)  Pulse 69  Temp 97.3  F (36.3  C) (Tympanic)  Wt 238 lb (108 kg)  SpO2 97%  BMI 34.15 kg/m2 Estimated body mass index is 34.15 kg/(m^2) as calculated from the following:    Height as of 4/11/17: 5' 10\" (1.778 m).    Weight as of this encounter: 238 lb (108 kg).  Medication Reconciliation: complete Ginger Epstein MA      "

## 2017-05-10 RX ORDER — PROPRANOLOL HYDROCHLORIDE 20 MG/1
20 TABLET ORAL 3 TIMES DAILY
Qty: 270 TABLET | Refills: 3 | COMMUNITY
Start: 2017-05-10 | End: 2017-06-08

## 2017-05-10 ASSESSMENT — PATIENT HEALTH QUESTIONNAIRE - PHQ9: SUM OF ALL RESPONSES TO PHQ QUESTIONS 1-9: 0

## 2017-05-10 NOTE — TELEPHONE ENCOUNTER
Routing to provider - Wegener - please review and advise as appropriate  1. Mychart response regarding starting statin  2. FLP is pending at this time    Thank you,  Klaudia Kearney RN

## 2017-05-10 NOTE — TELEPHONE ENCOUNTER
Does the inderal need to be updated on med list?Sounds like LA needs to be changed.  RODERICK Gu

## 2017-05-11 ENCOUNTER — OFFICE VISIT (OUTPATIENT)
Dept: WOUND CARE | Facility: CLINIC | Age: 57
End: 2017-05-11

## 2017-05-11 DIAGNOSIS — S31.819A WOUND, OPEN, BUTTOCK, RIGHT, INITIAL ENCOUNTER: Primary | ICD-10-CM

## 2017-05-11 NOTE — MR AVS SNAPSHOT
After Visit Summary   5/11/2017    Erwin Aguilar    MRN: [id number]           Patient Information     Date Of Birth          1960        Visit Information        Provider Department      5/11/2017 8:30 AM Breanna Sahni RN  Health Wound Ostomy        Today's Diagnoses     Wound, open, buttock, right    -  1       Follow-ups after your visit        Your next 10 appointments already scheduled     May 15, 2017  8:00 AM CDT   RETURN WOUND NURSE VISIT with Breanna Sahni RN   [facility] Wound Ostomy ([facility])    [address] [place] [address]  4th [facility] [id number] [phone]            Jun 22, 2017  8:30 AM CDT   (Arrive by 8:15 AM)   Return Visit with Victor M Anaya DPM   [facility] Wound Care ([facility])    [address] [place] [id number] [phone]            Oct 03, 2017  8:15 AM CDT   Lab with  LAB   [facility] Lab ([facility])    [address] [place] [id number] [phone]            Oct 03, 2017  9:20 AM CDT   (Arrive by 9:05 AM)   Return General Liver with Kimberly Ramirez MD   [facility] Hepatology ([facility])    [address] [place] [id number] [phone]              Who to contact     Please call your clinic at [phone] to:    Ask questions about your health    Make or cancel appointments    Discuss your medicines    Learn about your test results    Speak to your doctor   If you have compliments or concerns about an experience at your clinic, or if you wish to file a complaint, please contact [facility] Patient Relations at [phone] or email us at [email or web address]         Additional Information About Your Visit        MyChart Information     MyChart gives you secure access to your electronic health record. If you  see a primary care provider, you can also send messages to your care team and make appointments. If you have questions, please call your primary care clinic.  If you do not have a primary care provider, please call [phone] and they will assist you.      [employer] is an electronic gateway that provides easy, online access to your medical records. With [employer], you can request a clinic appointment, read your test results, renew a prescription or communicate with your care team.     To access your existing account, please contact your [facility] or call [phone] for assistance.        Care EveryWhere ID     This is your Care EveryWhere ID. This could be used by other organizations to access your [place] medical records  [id number]         Blood Pressure from Last 3 Encounters:   05/09/17 136/78   04/11/17 167/83   03/23/17 144/83    Weight from Last 3 Encounters:   05/09/17 108 kg (238 lb)   04/11/17 100.7 kg (222 lb)   03/23/17 102.3 kg (225 lb 9.6 oz)              Today, you had the following     No orders found for display         Today's Medication Changes          These changes are accurate as of: 5/11/17  9:06 AM.  If you have any questions, ask your nurse or doctor.               These medicines have changed or have updated prescriptions.        Dose/Directions    potassium chloride SA 20 MEQ CR tablet   Commonly known as:  potassium chloride   This may have changed:  how much to take   Used for:  Hypokalemia        Dose:  60 mEq   Take 3 tablets (60 mEq) by mouth daily   Quantity:  180 tablet   Refills:  3                Primary Care Provider Office Phone # Fax #    Joel Daniel Irwin Wegener, [phone]       [facility] [address] [facility] [address]        Thank you!     Thank you for choosing [facility] WOUND OSTOMY  for your care. Our goal is always to provide you with excellent care. Hearing back from our patients is one way we can  continue to improve our services. Please take a few minutes to complete the written survey that you may receive in the mail after your visit with us. Thank you!             Your Updated Medication List - Protect others around you: Learn how to safely use, store and throw away your medicines at www.disposemymeds.org.          This list is accurate as of: 5/11/17  9:06 AM.  Always use your most recent med list.                   Brand Name Dispense Instructions for use    * ACE/ARB NOT PRESCRIBED (INTENTIONAL)      ACE & ARB not prescribed due to Allergy       ACURA BLOOD GLUCOSE METER W/DEVICE Kit     1 kit    1 Dose 2 times daily       albuterol 108 (90 BASE) MCG/ACT Inhaler    PROAIR HFA/PROVENTIL HFA/VENTOLIN HFA    3 Inhaler    Inhale 2 puffs into the lungs every 6 hours as needed for shortness of breath / dyspnea or wheezing Ventolin or other covered brand       Alcohol Pads 70 % Pads     100 each    1 Box 4 times daily       baclofen 10 MG tablet    LIORESAL    180 tablet    Take 2 tablets (20 mg) by mouth 3 times daily       blood glucose monitoring lancets     1 Box    Use to test blood sugars 2 times daily or as directed.       blood glucose monitoring test strip    no brand specified    1 Box    Use to test blood sugars one time daily or as directed  Accucheck Richards plus test strips       clindamycin 150 MG capsule    CLEOCIN    10 capsule    OPEN 1 CAP AND PLACE IN 1 LITER OF NORMAL SALINE & MIX. IRRIGATE WITH 20ML EACH NOSTRIL 4 TIMES/DAY       COMPRESSION STOCKINGS     2 each    Knee high compression socks 30-40-mm       hydrochlorothiazide 25 MG tablet    HYDRODIURIL    90 tablet    Take 1 tablet (25 mg) by mouth daily       insulin pen needle 32G X 4 MM    BD ROSE U/F    90 each    Use one daily as directed.  Bd ultra fine pen needles rose       LANTUS SOLOSTAR 100 UNIT/ML injection   Generic drug:  insulin glargine     15 mL    Inject 28 units under the skin daily.       LORazepam 0.5 MG tablet     ATIVAN    30 tablet    1-2 daily for muscle spasm as needed. #30/month       mupirocin 2 % ointment    BACTROBAN    2 g    Apply to anterior nares BID X 2 month supply       omeprazole 40 MG capsule    priLOSEC    90 capsule    Take 1 capsule (40 mg) by mouth daily Take 30-60 minutes before a meal.       * order for DME     1 Device    Equipment being ordered: BP cuff       order for DME     1 Device    Equipment being ordered: one donut for sitting       potassium chloride SA 20 MEQ CR tablet    potassium chloride    180 tablet    Take 3 tablets (60 mEq) by mouth daily       promethazine 25 MG tablet    PHENERGAN    40 tablet    Take 1 tablet (25 mg) by mouth every 6 hours as needed for nausea       propranolol 20 MG tablet    INDERAL    270 tablet    Take 1 tablet (20 mg) by mouth 3 times daily       sertraline 100 MG tablet    ZOLOFT    180 tablet    Take 2 tablets (200 mg) by mouth daily       sodium chloride 0.9 % Soln     17652 mL    Irrigate 20 cc each nostril QID X 2 month supply       Syringe (Disposable) 25 ML Misc     1 each    Syringe to be used for nasal irrigation       Urea 20 % Crea cream     480 g    Externally apply topically daily       * Notice:  This list has 2 medication(s) that are the same as other medications prescribed for you. Read the directions carefully, and ask your doctor or other care provider to review them with you.

## 2017-05-11 NOTE — PROGRESS NOTES
Patient comes to wound clinic for wound assessment per request of dr. Licona. He has history of a ulcer on the right fleshy buttock due to Neuropathic Ulcer: Pressure ulcer, stage II:Located on right buttock near anus, still present. Not painful because patient lacks sensation below the waist 2/2 complication of back surgery in the past. He has noted bleeding and oozing from the ulcer, however it is now minimally draining.    Clean gloves are donned and current dressing removed. Previous treatment includes: polymem  This is treatment week:9  Objective findings: no change dry wound       Location: right buttock near anus non-tracking looks like a  fissure    Previous Scabbed area noted on Coccyx recommended moisturizer now a new wound: 1 cm x 1.5 cm x 0.2 cm Unlikely to be a  Pressure ulcer stg 2 but pt is not bed bound so etiology is unknown    Wound: Full thickness,     Wound description: no sign of infection    Tenderness: no    Drainage is light, serosanguinous     Wound Base: Dry pale wound bed     Periwound Skin: intact, fibrous scarring maceration medially    Color: normal and consistent with surrounding tissue            Subjective finding:     Pt states: Comfeel  Falls of in 8 hours    Patient is assessed for discomfort which is: absent     Today's status of the wound: stable, dry but stalled wounds     Treatment: Removal of existing dressing, Visual inspection, Wound cleansed with dermal wound cleanser. Change in treatment plan: Jazmin to both wound, aquacel for moisture and Normogel on Jazmin, covered with bandaid, Keep in place until Saturday then just moisturize with petroleum jelly daily. Treated and follow-up appointment made for 2 week. Continue with current treatment but be carefull to place the dresing near the medial aspect of the wound.      Pt received the following instructions: Keep dressing on until next apt. Encouraged decreased sitting. Pt should lay on his sides or be up Dr. Anaya was  available for supervision of care if needed or if questions should arise and regarding plan of care. Breanna Sahni RN, CWON

## 2017-05-15 ENCOUNTER — OFFICE VISIT (OUTPATIENT)
Dept: WOUND CARE | Facility: CLINIC | Age: 57
End: 2017-05-15

## 2017-05-15 ENCOUNTER — MYC MEDICAL ADVICE (OUTPATIENT)
Dept: FAMILY MEDICINE | Facility: CLINIC | Age: 57
End: 2017-05-15

## 2017-05-15 DIAGNOSIS — S31.819A WOUND, OPEN, BUTTOCK, RIGHT, INITIAL ENCOUNTER: Primary | ICD-10-CM

## 2017-05-15 DIAGNOSIS — E11.42 TYPE 2 DIABETES MELLITUS WITH DIABETIC POLYNEUROPATHY, WITH LONG-TERM CURRENT USE OF INSULIN (H): ICD-10-CM

## 2017-05-15 DIAGNOSIS — E78.5 HYPERLIPIDEMIA LDL GOAL <100: ICD-10-CM

## 2017-05-15 DIAGNOSIS — Z79.4 TYPE 2 DIABETES MELLITUS WITH DIABETIC POLYNEUROPATHY, WITH LONG-TERM CURRENT USE OF INSULIN (H): ICD-10-CM

## 2017-05-15 LAB
CHOLEST SERPL-MCNC: 238 MG/DL
HBA1C MFR BLD: 5.8 % (ref 4.3–6)
HDLC SERPL-MCNC: 43 MG/DL
LDLC SERPL CALC-MCNC: 173 MG/DL
NONHDLC SERPL-MCNC: 195 MG/DL
TRIGL SERPL-MCNC: 109 MG/DL

## 2017-05-15 PROCEDURE — 83036 HEMOGLOBIN GLYCOSYLATED A1C: CPT | Performed by: FAMILY MEDICINE

## 2017-05-15 PROCEDURE — 36415 COLL VENOUS BLD VENIPUNCTURE: CPT | Performed by: FAMILY MEDICINE

## 2017-05-15 PROCEDURE — 80061 LIPID PANEL: CPT | Performed by: FAMILY MEDICINE

## 2017-05-15 NOTE — MR AVS SNAPSHOT
After Visit Summary   5/15/2017    Erwin Aguilar    MRN: [id number]           Patient Information     Date Of Birth          1960        Visit Information        Provider Department      5/15/2017 8:00 AM Breanna Sahni RN [facility] Wound Ostomy        Today's Diagnoses     Wound, open, buttock, right    -  1       Follow-ups after your visit        Your next 10 appointments already scheduled     May 23, 2017  8:30 AM CDT   (Arrive by 8:15 AM)   Return Plastic Surgery with Delilah Molina MD   [facility] Plastic and Reconstructive Surgery ([facility])    [address] [place] [id number] [phone]           Do not wear perfume.            May 26, 2017  8:00 AM CDT   RETURN WOUND NURSE VISIT with Roland Chapin RN   [facility] Wound Ostomy ([facility])    [address] [place] [id number] [phone]            Jun 22, 2017  8:30 AM CDT   (Arrive by 8:15 AM)   Return Visit with Victor M Anaya DPM   [facility] Wound Care ([facility])    [address] [place] [id number] [phone]            Oct 03, 2017  8:15 AM CDT   Lab with  LAB   [facility] Lab ([facility])    [address] [place] [id number] [phone]            Oct 03, 2017  9:20 AM CDT   (Arrive by 9:05 AM)   Return General Liver with Kimberly Ramirez MD   [facility] Hepatology ([facility])    [address] [place] [id number] [phone]              Who to contact     Please call your clinic at [phone] to:    Ask questions about your health    Make or cancel appointments    Discuss your medicines    Learn about your test results    Speak to your doctor   If you have compliments or concerns about an experience at your clinic, or if you wish to  file a complaint, please contact [facility] Patient Relations at [phone] or email us at [email or web address]         Additional Information About Your Visit        "[employer]hart Information     [employer] gives you secure access to your electronic health record. If you see a primary care provider, you can also send messages to your care team and make appointments. If you have questions, please call your primary care clinic.  If you do not have a primary care provider, please call [phone] and they will assist you.      [employer] is an electronic gateway that provides easy, online access to your medical records. With [employer], you can request a clinic appointment, read your test results, renew a prescription or communicate with your care team.     To access your existing account, please contact your [facility] Clinic or call [phone] for assistance.        Care EveryWhere ID     This is your Care EveryWhere ID. This could be used by other organizations to access your [facility] medical records  [id number]         Blood Pressure from Last 3 Encounters:   05/09/17 136/78   04/11/17 167/83   03/23/17 144/83    Weight from Last 3 Encounters:   05/09/17 108 kg (238 lb)   04/11/17 100.7 kg (222 lb)   03/23/17 102.3 kg (225 lb 9.6 oz)              Today, you had the following     No orders found for display         Today's Medication Changes          These changes are accurate as of: 5/15/17  9:57 AM.  If you have any questions, ask your nurse or doctor.               These medicines have changed or have updated prescriptions.        Dose/Directions    potassium chloride SA 20 MEQ CR tablet   Commonly known as:  potassium chloride   This may have changed:  how much to take   Used for:  Hypokalemia        Dose:  60 mEq   Take 3 tablets (60 mEq) by mouth daily   Quantity:  180 tablet   Refills:  3                Primary Care Provider Office Phone # Fax #    Demario  Daniel Irwin Wegener, [phone]       [facility] [address] [facility] [address]        Thank you!     Thank you for choosing [facility] WOUND OSTOMY  for your care. Our goal is always to provide you with excellent care. Hearing back from our patients is one way we can continue to improve our services. Please take a few minutes to complete the written survey that you may receive in the mail after your visit with us. Thank you!             Your Updated Medication List - Protect others around you: Learn how to safely use, store and throw away your medicines at www.disposemymeds.org.          This list is accurate as of: 5/15/17  9:57 AM.  Always use your most recent med list.                   Brand Name Dispense Instructions for use    * ACE/ARB NOT PRESCRIBED (INTENTIONAL)      ACE & ARB not prescribed due to Allergy       ACURA BLOOD GLUCOSE METER W/DEVICE Kit     1 kit    1 Dose 2 times daily       albuterol 108 (90 BASE) MCG/ACT Inhaler    PROAIR HFA/PROVENTIL HFA/VENTOLIN HFA    3 Inhaler    Inhale 2 puffs into the lungs every 6 hours as needed for shortness of breath / dyspnea or wheezing Ventolin or other covered brand       Alcohol Pads 70 % Pads     100 each    1 Box 4 times daily       baclofen 10 MG tablet    LIORESAL    180 tablet    Take 2 tablets (20 mg) by mouth 3 times daily       blood glucose monitoring lancets     1 Box    Use to test blood sugars 2 times daily or as directed.       blood glucose monitoring test strip    no brand specified    1 Box    Use to test blood sugars one time daily or as directed  Accucheck Richards plus test strips       clindamycin 150 MG capsule    CLEOCIN    10 capsule    OPEN 1 CAP AND PLACE IN 1 LITER OF NORMAL SALINE & MIX. IRRIGATE WITH 20ML EACH NOSTRIL 4 TIMES/DAY       COMPRESSION STOCKINGS     2 each    Knee high compression socks 30-40-mm       hydrochlorothiazide 25 MG tablet    HYDRODIURIL    90 tablet    Take 1 tablet (25 mg) by  mouth daily       insulin pen needle 32G X 4 MM    BD ROSE U/F    90 each    Use one daily as directed.  Bd ultra fine pen needles rose       LANTUS SOLOSTAR 100 UNIT/ML injection   Generic drug:  insulin glargine     15 mL    Inject 28 units under the skin daily.       LORazepam 0.5 MG tablet    ATIVAN    30 tablet    1-2 daily for muscle spasm as needed. #30/month       mupirocin 2 % ointment    BACTROBAN    2 g    Apply to anterior nares BID X 2 month supply       omeprazole 40 MG capsule    priLOSEC    90 capsule    Take 1 capsule (40 mg) by mouth daily Take 30-60 minutes before a meal.       * order for DME     1 Device    Equipment being ordered: BP cuff       order for DME     1 Device    Equipment being ordered: one donut for sitting       potassium chloride SA 20 MEQ CR tablet    potassium chloride    180 tablet    Take 3 tablets (60 mEq) by mouth daily       promethazine 25 MG tablet    PHENERGAN    40 tablet    Take 1 tablet (25 mg) by mouth every 6 hours as needed for nausea       propranolol 20 MG tablet    INDERAL    270 tablet    Take 1 tablet (20 mg) by mouth 3 times daily       sertraline 100 MG tablet    ZOLOFT    180 tablet    Take 2 tablets (200 mg) by mouth daily       sodium chloride 0.9 % Soln     77146 mL    Irrigate 20 cc each nostril QID X 2 month supply       Syringe (Disposable) 25 ML Misc     1 each    Syringe to be used for nasal irrigation       Urea 20 % Crea cream     480 g    Externally apply topically daily       * Notice:  This list has 2 medication(s) that are the same as other medications prescribed for you. Read the directions carefully, and ask your doctor or other care provider to review them with you.

## 2017-05-15 NOTE — PROGRESS NOTES
Patient comes to wound clinic for wound assessment per request of dr. Licona. He has history of a ulcer on the right fleshy buttock due to Neuropathic Ulcer: Pressure ulcer, stage II:Located on right buttock near anus, still present. Not painful because patient lacks sensation below the waist 2/2 complication of back surgery in the past. He has noted bleeding and oozing from the ulcer, however it is now minimally draining.    Clean gloves are donned and current dressing removed. Previous treatment includes: Jazmin  This is treatment week:10  Objective findings: no change dry wounds       Location: right buttock near anus non-tracking looks like a  fissure    Previous Scabbed area noted near Coccyx recommended moisturizer now a new wound: 1 cm x 1.5 cm x 0.2 cm Unlikely to be a  pressure ulcer stg 2 but pt is not bed bound so etiology is unknown    Wound: Full thickness,     Wound description: no sign of infection    Tenderness: no    Drainage is light, serosanguinous     Wound Base: Dry pale wound bed     Periwound Skin: intact, fibrous scarring maceration near fissure    Color: normal and consistent with surrounding tissue        Subjective finding:     Pt states: Jazmin stayed in place for 3 days.    Patient is assessed for discomfort which is: absent     Today's status of the wound: stable, dry but stalled wounds     Treatment: Removal of existing dressing, Visual inspection, Wound cleansed with dermal wound cleanser. Change in treatment plan: endoform to both wounds, aquacel for moisture and Normogel on Endoform covered with bandaid, Keep in place until 2 days and replace. Treated and follow-up appointment made for 1 week with Dr Molina. Continue with current treatment but be carefull to place the dresing near the medial aspect of the wound.      Pt received the following instructions: Keep dressing on until next apt. Encouraged decreased sitting. Pt should lay on his sides or be up Dr. Nicholson was available  for supervision of care if needed or if questions should arise and regarding plan of care. Breanna Sahni RN, CWON

## 2017-05-15 NOTE — TELEPHONE ENCOUNTER
Routing to provider - Wegener - please review and advise as appropriate  1. Medication update: LORazepam (ATIVAN) 0.5 MG tablet  2. Sent patient triage Risk Ident message  3. Last office visit 5/9/2017  4. Do you want to advise any alternatives at this time?    Thank you,  Klaudia Kearney RN

## 2017-05-30 ENCOUNTER — OFFICE VISIT (OUTPATIENT)
Dept: PLASTIC SURGERY | Facility: CLINIC | Age: 57
End: 2017-05-30

## 2017-05-30 DIAGNOSIS — S31.819A: Primary | ICD-10-CM

## 2017-05-30 NOTE — LETTER
5/30/2017       RE: Erwin Aguilar  [address]  [place] [address]     Dear Colleague,    Thank you for referring your patient, Erwin Aguilar, to the [facility] PLASTIC AND RECONSTRUCTIVE SURGERY at [facility]. Please see a copy of my visit note below.    PLASTICS WOUND   Dictation on: 05/30/2017  2:40 PM by: CHAN RUFF [[id number]]         Again, thank you for allowing me to participate in the care of your patient.      Sincerely,    Delilah Ruff MD

## 2017-05-30 NOTE — PROGRESS NOTES
PLASTICS WOUND  This is a 56-year-old patient of our wound care nurse who has been seeing her for a superficial stage II kind of wound in the right pericoccyx area and also perianal area.  He has a significant history that includes cauda equina syndrome with decreased sensation in the lower truncal area.  He walks with a cane.  They have been trying Jazmin on his wound and he states that he ran out last Thursday.  Otherwise, he has just been leaving it dry after cleaning it.  He does his own dressings.  Of note, his healing ability is probably altered by the fact that he has a history of alcoholic cirrhosis and esophageal aneurysm but reportedly has normalized his liver function tests since he has been sober.  He is on a Medtronic pain pump for chronic pain from his spinal surgeries.  Essentially the wounds today appear to be relatively clean.  There was a little bit of slough on the perianal wound, but otherwise, it looks as though the skin edges are advancing albeit very slowly.  There is a little bit of a callus around the perianal wound as opposed to the coccygeal wound.  I discussed some various options including not doing anything to the wounds, but I felt like he can probably benefit from something to keep things more moist and antimicrobial and a little bit of debriding so we chose Medihoney.  He has used that on other wounds of his and they have responded nicely, so hopefully it will respond in kind for these wounds.  Today that was applied and then covered with a Mepilex border.  Interestingly, because of his cauda equina syndrome, he goes to the bathroom 20-40 times a day to empty his bladder and move his bowels just in an attempt to avoid having any accidents.  He does not wear any Depends.  He does not always know what is going to happen if anything so he usually needs to sit on the toilet for either urinating or defecating.  He does not sit for very long so I do not think any of these wounds are due to  any kind of pressure but rather neuropathic in nature.  We did have a short discussion about the possibility of referring him to someone who could talk about either urinary or stool diversion to perhaps ease his lifestyle a little bit from always being in the bathroom.  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  Also, he went through a very, very serious illness several years ago with his liver and feels like he is actually very blessed and is not to the point where he needs to do some more surgery or have something else done to help him with his toileting.  Certainly, if he is ever interested, we can certainly refer him.  I think he will follow up again with Suma in the next few weeks as needed.  He does not have a scheduled appointment with her as of yet.  He will let us know if there are any other issues or concerns.       Total time was spent with the patient, greater than half of which was spent discussing possible care plan and wound cares.

## 2017-05-30 NOTE — LETTER
5/30/2017      RE: Erwin Aguilar  [address] [place] [address]       PLASTICS WOUND  This is a 56-year-old patient of our wound care nurse who has been seeing her for a superficial stage II kind of wound in the right pericoccyx area and also perianal area.  He has a significant history that includes cauda equina syndrome with decreased sensation in the lower truncal area.  He walks with a cane.  They have been trying Jazmin on his wound and he states that he ran out last Thursday.  Otherwise, he has just been leaving it dry after cleaning it.  He does his own dressings.  Of note, his healing ability is probably altered by the fact that he has a history of alcoholic cirrhosis and esophageal aneurysm but reportedly has normalized his liver function tests since he has been sober.  He is on a Medtronic pain pump for chronic pain from his spinal surgeries.  Essentially the wounds today appear to be relatively clean.  There was a little bit of slough on the perianal wound, but otherwise, it looks as though the skin edges are advancing albeit very slowly.  There is a little bit of a callus around the perianal wound as opposed to the coccygeal wound.  I discussed some various options including not doing anything to the wounds, but I felt like he can probably benefit from something to keep things more moist and antimicrobial and a little bit of debriding so we chose Medihoney.  He has used that on other wounds of his and they have responded nicely, so hopefully it will respond in kind for these wounds.  Today that was applied and then covered with a Mepilex border.  Interestingly, because of his cauda equina syndrome, he goes to the bathroom 20-40 times a day to empty his bladder and move his bowels just in an attempt to avoid having any accidents.  He does not wear any Depends.  He does not always know what is going to happen if anything so he usually needs to sit on the toilet for either urinating or  defecating.  He does not sit for very long so I do not think any of these wounds are due to any kind of pressure but rather neuropathic in nature.  We did have a short discussion about the possibility of referring him to someone who could talk about either urinary or stool diversion to perhaps ease his lifestyle a little bit from always being in the bathroom.  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  Also, he went through a very, very serious illness several years ago with his liver and feels like he is actually very blessed and is not to the point where he needs to do some more surgery or have something else done to help him with his toileting.  Certainly, if he is ever interested, we can certainly refer him.  I think he will follow up again with Suma in the next few weeks as needed.  He does not have a scheduled appointment with her as of yet.  He will let us know if there are any other issues or concerns.       Total time was spent with the patient, greater than half of which was spent discussing possible care plan and wound cares.   Delilah Molina MD

## 2017-06-08 ENCOUNTER — MYC MEDICAL ADVICE (OUTPATIENT)
Dept: FAMILY MEDICINE | Facility: CLINIC | Age: 57
End: 2017-06-08

## 2017-06-08 DIAGNOSIS — K74.60 ESOPHAGEAL VARICES IN CIRRHOSIS (H): ICD-10-CM

## 2017-06-08 DIAGNOSIS — I85.10 ESOPHAGEAL VARICES IN CIRRHOSIS (H): ICD-10-CM

## 2017-06-08 RX ORDER — PROPRANOLOL HYDROCHLORIDE 20 MG/1
20 TABLET ORAL 3 TIMES DAILY
Qty: 270 TABLET | Refills: 3 | Status: SHIPPED | OUTPATIENT
Start: 2017-06-08 | End: 2018-05-27

## 2017-06-14 ENCOUNTER — OFFICE VISIT (OUTPATIENT)
Dept: ORTHOPEDICS | Facility: CLINIC | Age: 57
End: 2017-06-14

## 2017-06-14 DIAGNOSIS — G83.4 CAUDA EQUINA SYNDROME WITH NEUROGENIC BLADDER (H): ICD-10-CM

## 2017-06-14 DIAGNOSIS — Z79.4 TYPE 2 DIABETES MELLITUS WITH DIABETIC POLYNEUROPATHY, WITH LONG-TERM CURRENT USE OF INSULIN (H): ICD-10-CM

## 2017-06-14 DIAGNOSIS — L60.0 INGROWN NAIL: ICD-10-CM

## 2017-06-14 DIAGNOSIS — E11.42 TYPE 2 DIABETES MELLITUS WITH DIABETIC POLYNEUROPATHY, WITH LONG-TERM CURRENT USE OF INSULIN (H): ICD-10-CM

## 2017-06-14 DIAGNOSIS — L84 TYLOMA: ICD-10-CM

## 2017-06-14 DIAGNOSIS — B35.1 ONYCHOMYCOSIS: Primary | ICD-10-CM

## 2017-06-14 NOTE — PROGRESS NOTES
Chief Complaint:   Chief Complaint   Patient presents with     Musculoskeletal Problem     Follow up right foot pain, has ingrown toe nails bilaterally. Got new shows and brace for leg right side which has been effective.           Allergies   Allergen Reactions     Lisinopril Anaphylaxis     Swollen tongue; inability to swallow; drooling; hives; swollen face, neck, angioedema     Acetaminophen      Hx of cirrhosis      Amlodipine      Swelling, hives, possible angioedema       Morphine      Hypotension     Bactrim [Sulfamethoxazole W/Trimethoprim] Rash     Levaquin Swelling and Rash     Swelling in lip and tongue felt thick       Subjective: Erwin is a 56 year old male who presents to the clinic today for diabetic foot care. He would like his toenails clipped and callus trimmed. He has had ingrown toenails on both big toes for years. He usually clips these back himself, because he doesn't have much feeling. He recently did this to both nails (lateral side on L, medial side on R) - they bled a lot, but pain is mostly relieved. Still having pain on the medial side of R big toenail, thinks there is a piece of nail still stuck in there. Reports that he got the leg brace and custom shoes made - they have helped reduce his pain with walking immensely. No other concerns today.     Objective  DP and PT pulses non-palpable on Right, DP and PT pulses 2/4 Left. CRT <3 seconds. Normal pedal hair.   Gross sensation decreased bilaterally.   Nails are thickened and discolored to varying degrees bilaterally. Spicule of nail is ingrown into R medial hallux nail border, without edema, erythema or purulent drainage. Mild pain upon palpation. No open lesions noted. Skin normal except for hyperkeratotic tissue build up on the lateral heel of R foot.    Assessment:   - Onychomycosis  - Tyloma  - Ingrown toenails  - DM type 2, controlled  - Peripheral neuropathy secondary to cauda equina syndrome    Plan:   - Pt seen and evaluated  -  Nails debrided upon consent. Removed spicule of R hallux medial border using a freer and nail aster. Ethyl chloride spray was used for pain relief, as patient is neuropathic. Applied bacitracin to nail bed and covered with a gauze dressing. Patient can remove this dressing tomorrow and clean as normal. Apply bacitracin to area for about 3 more days with a bandaid.  - Callus of R heel trimmed upon consent without complication.  - Teach patient Arai taping technique at next appointment if needed  - Pt to return to clinic next week for check and possible permanent nail avulsions, otherwise in 3 months for diabetic foot care.

## 2017-06-14 NOTE — MR AVS SNAPSHOT
After Visit Summary   6/14/2017    Erwin Aguilar    MRN: [id number]           Patient Information     Date Of Birth          1960        Visit Information        Provider Department      6/14/2017 10:40 AM Victor M Anaya DPM [facility]        Today's Diagnoses     Onychomycosis    -  1    Ingrown nail        Tyloma        Type 2 diabetes mellitus with diabetic polyneuropathy, with long-term current use of insulin (H)        Cauda equina syndrome with neurogenic bladder (H)           Follow-ups after your visit        Your next 10 appointments already scheduled     Jun 21, 2017  7:40 AM CDT   (Arrive by 7:25 AM)   RETURN FOOT/ANKLE with Victor M Anaya DPM   [facility] ([facility])    [address]  4th Floor  [facility] [address] [phone]            Oct 03, 2017  8:15 AM CDT   Lab with  LAB   [facility] ([facility])    [address]  1st Floor  [facility] [id number] [phone]            Oct 03, 2017  9:20 AM CDT   (Arrive by 9:05 AM)   Return General Liver with Kimberly Ramirez MD   [facility] ([facility])    [address]  3rd Floor  [facility] [id number] [phone]              Who to contact     Please call your clinic at [phone] to:    Ask questions about your health    Make or cancel appointments    Discuss your medicines    Learn about your test results    Speak to your doctor   If you have compliments or concerns about an experience at your clinic, or if you wish to file a complaint, please contact [facility] Patient Relations at [phone] or email us at [email or web address]         Additional Information About Your Visit        MyChart Information     [facility] gives you secure access to your electronic health record. If you see a primary care  provider, you can also send messages to your care team and make appointments. If you have questions, please call your primary care clinic.  If you do not have a primary care provider, please call [phone] and they will assist you.      [employer] is an electronic gateway that provides easy, online access to your medical records. With CE[employer], you can request a clinic appointment, read your test results, renew a prescription or communicate with your care team.     To access your existing account, please contact your [facility] or call [phone] for assistance.        Care EveryWhere ID     This is your Care EveryWhere ID. This could be used by other organizations to access your [place] medical records  [id number]         Blood Pressure from Last 3 Encounters:   05/09/17 136/78   04/11/17 167/83   03/23/17 144/83    Weight from Last 3 Encounters:   05/09/17 108 kg (238 lb)   04/11/17 100.7 kg (222 lb)   03/23/17 102.3 kg (225 lb 9.6 oz)              We Performed the Following     DEBRIDEMENT OF NAILS, 6 OR MORE     TRIM HYPERKERATOTIC SKIN LESION, ONE          Today's Medication Changes          These changes are accurate as of: 6/14/17 11:25 AM.  If you have any questions, ask your nurse or doctor.               These medicines have changed or have updated prescriptions.        Dose/Directions    potassium chloride SA 20 MEQ CR tablet   Commonly known as:  potassium chloride   This may have changed:  how much to take   Used for:  Hypokalemia        Dose:  60 mEq   Take 3 tablets (60 mEq) by mouth daily   Quantity:  180 tablet   Refills:  3         Stop taking these medicines if you haven't already. Please contact your care team if you have questions.     LORazepam 0.5 MG tablet   Commonly known as:  ATIVAN                    Primary Care Provider Office Phone # Fax #    Joel Daniel Irwin Wegener, [phone]       [address]  [address]        Thank you!     Thank you for choosing [facility]  for your care. Our goal is always to provide you with excellent care. Hearing back from our patients is one way we can continue to improve our services. Please take a few minutes to complete the written survey that you may receive in the mail after your visit with us. Thank you!             Your Updated Medication List - Protect others around you: Learn how to safely use, store and throw away your medicines at www.disposemymeds.org.          This list is accurate as of: 6/14/17 11:25 AM.  Always use your most recent med list.                   Brand Name Dispense Instructions for use    * ACE/ARB NOT PRESCRIBED (INTENTIONAL)      ACE & ARB not prescribed due to Allergy       ACURA BLOOD GLUCOSE METER W/DEVICE Kit     1 kit    1 Dose 2 times daily       albuterol 108 (90 BASE) MCG/ACT Inhaler    PROAIR HFA/PROVENTIL HFA/VENTOLIN HFA    3 Inhaler    Inhale 2 puffs into the lungs every 6 hours as needed for shortness of breath / dyspnea or wheezing Ventolin or other covered brand       Alcohol Pads 70 % Pads     100 each    1 Box 4 times daily       baclofen 10 MG tablet    LIORESAL    180 tablet    Take 2 tablets (20 mg) by mouth 3 times daily       blood glucose monitoring lancets     1 Box    Use to test blood sugars 2 times daily or as directed.       blood glucose monitoring test strip    no brand specified    1 Box    Use to test blood sugars one time daily or as directed  Accucheck Richards plus test strips       clindamycin 150 MG capsule    CLEOCIN    10 capsule    OPEN 1 CAP AND PLACE IN 1 LITER OF NORMAL SALINE & MIX. IRRIGATE WITH 20ML EACH NOSTRIL 4 TIMES/DAY       COMPRESSION STOCKINGS     2 each    Knee high compression socks 30-40-mm       hydrochlorothiazide 25 MG tablet    HYDRODIURIL    90 tablet    Take 1 tablet (25 mg) by mouth daily       insulin pen needle 32G X 4 MM    BD ROSE U/F    90 each    Use one daily as  directed.  Bd ultra fine pen needles ubaldo       LANTUS SOLOSTAR 100 UNIT/ML injection   Generic drug:  insulin glargine     15 mL    Inject 28 units under the skin daily.       mupirocin 2 % ointment    BACTROBAN    2 g    Apply to anterior nares BID X 2 month supply       omeprazole 40 MG capsule    priLOSEC    90 capsule    Take 1 capsule (40 mg) by mouth daily Take 30-60 minutes before a meal.       * order for DME     1 Device    Equipment being ordered: BP cuff       order for DME     1 Device    Equipment being ordered: one donut for sitting       potassium chloride SA 20 MEQ CR tablet    potassium chloride    180 tablet    Take 3 tablets (60 mEq) by mouth daily       promethazine 25 MG tablet    PHENERGAN    40 tablet    Take 1 tablet (25 mg) by mouth every 6 hours as needed for nausea       propranolol 20 MG tablet    INDERAL    270 tablet    Take 1 tablet (20 mg) by mouth 3 times daily       sertraline 100 MG tablet    ZOLOFT    180 tablet    Take 2 tablets (200 mg) by mouth daily       sodium chloride 0.9 % Soln     31226 mL    Irrigate 20 cc each nostril QID X 2 month supply       Syringe (Disposable) 25 ML Misc     1 each    Syringe to be used for nasal irrigation       Urea 20 % Crea cream     480 g    Externally apply topically daily       * Notice:  This list has 2 medication(s) that are the same as other medications prescribed for you. Read the directions carefully, and ask your doctor or other care provider to review them with you.

## 2017-06-14 NOTE — LETTER
6/14/2017      RE: Erwin Aguilar  [address]  [address]  [place] [address]       Chief Complaint:   Chief Complaint   Patient presents with     Musculoskeletal Problem     Follow up right foot pain, has ingrown toe nails bilaterally. Got new shows and brace for leg right side which has been effective.           Allergies   Allergen Reactions     Lisinopril Anaphylaxis     Swollen tongue; inability to swallow; drooling; hives; swollen face, neck, angioedema     Acetaminophen      Hx of cirrhosis      Amlodipine      Swelling, hives, possible angioedema       Morphine      Hypotension     Bactrim [Sulfamethoxazole W/Trimethoprim] Rash     Levaquin Swelling and Rash     Swelling in lip and tongue felt thick       Subjective: Erwin is a 56 year old male who presents to the clinic today for diabetic foot care. He would like his toenails clipped and callus trimmed. He has had ingrown toenails on both big toes for years. He usually clips these back himself, because he doesn't have much feeling. He recently did this to both nails (lateral side on L, medial side on R) - they bled a lot, but pain is mostly relieved. Still having pain on the medial side of R big toenail, thinks there is a piece of nail still stuck in there. Reports that he got the leg brace and custom shoes made - they have helped reduce his pain with walking immensely. No other concerns today.     Objective  DP and PT pulses non-palpable on Right, DP and PT pulses 2/4 Left. CRT <3 seconds. Normal pedal hair.   Gross sensation decreased bilaterally.   Nails are thickened and discolored to varying degrees bilaterally. Spicule of nail is ingrown into R medial hallux nail border, without edema, erythema or purulent drainage. Mild pain upon palpation. No open lesions noted. Skin normal except for hyperkeratotic tissue build up on the lateral heel of R foot.    Assessment:   - Onychomycosis  - Tyloma  - Ingrown toenails  - DM type 2, controlled  -  Peripheral neuropathy secondary to cauda equina syndrome    Plan:   - Pt seen and evaluated  - Nails debrided upon consent. Removed spicule of R hallux medial border using a freer and nail aster. Ethyl chloride spray was used for pain relief, as patient is neuropathic. Applied bacitracin to nail bed and covered with a gauze dressing. Patient can remove this dressing tomorrow and clean as normal. Apply bacitracin to area for about 3 more days with a bandaid.  - Callus of R heel trimmed upon consent without complication.  - Teach patient Arai taping technique at next appointment if needed  - Pt to return to clinic next week for check and possible permanent nail avulsions, otherwise in 3 months for diabetic foot care.      Victor M Anaya DPM

## 2017-06-14 NOTE — LETTER
6/14/2017       RE: Erwin Aguilar  [address]  [address]  [place] [address]     Dear Colleague,    Thank you for referring your patient, Erwin Aguilar, to the [facility] at [facility]. Please see a copy of my visit note below.    Chief Complaint:   Chief Complaint   Patient presents with     Musculoskeletal Problem     Follow up right foot pain, has ingrown toe nails bilaterally. Got new shows and brace for leg right side which has been effective.           Allergies   Allergen Reactions     Lisinopril Anaphylaxis     Swollen tongue; inability to swallow; drooling; hives; swollen face, neck, angioedema     Acetaminophen      Hx of cirrhosis      Amlodipine      Swelling, hives, possible angioedema       Morphine      Hypotension     Bactrim [Sulfamethoxazole W/Trimethoprim] Rash     Levaquin Swelling and Rash     Swelling in lip and tongue felt thick       Subjective: Erwin is a 56 year old male who presents to the clinic today for diabetic foot care. He would like his toenails clipped and callus trimmed. He has had ingrown toenails on both big toes for years. He usually clips these back himself, because he doesn't have much feeling. He recently did this to both nails (lateral side on L, medial side on R) - they bled a lot, but pain is mostly relieved. Still having pain on the medial side of R big toenail, thinks there is a piece of nail still stuck in there. Reports that he got the leg brace and custom shoes made - they have helped reduce his pain with walking immensely. No other concerns today.     Objective  DP and PT pulses non-palpable on Right, DP and PT pulses 2/4 Left. CRT <3 seconds. Normal pedal hair.   Gross sensation decreased bilaterally.   Nails are thickened and discolored to varying degrees bilaterally. Spicule of nail is ingrown into R medial hallux nail border, without edema, erythema or purulent drainage. Mild pain upon palpation. No  open lesions noted. Skin normal except for hyperkeratotic tissue build up on the lateral heel of R foot.    Assessment:   - Onychomycosis  - Tyloma  - Ingrown toenails  - DM type 2, controlled  - Peripheral neuropathy secondary to cauda equina syndrome    Plan:   - Pt seen and evaluated  - Nails debrided upon consent. Removed spicule of R hallux medial border using a freer and nail aster. Ethyl chloride spray was used for pain relief, as patient is neuropathic. Applied bacitracin to nail bed and covered with a gauze dressing. Patient can remove this dressing tomorrow and clean as normal. Apply bacitracin to area for about 3 more days with a bandaid.  - Callus of R heel trimmed upon consent without complication.  - Teach patient Arai taping technique at next appointment if needed  - Pt to return to clinic next week for check and possible permanent nail avulsions, otherwise in 3 months for diabetic foot care.      Again, thank you for allowing me to participate in the care of your patient.      Sincerely,    Victor M Anaya DPM

## 2017-06-14 NOTE — NURSING NOTE
Reason For Visit:   Chief Complaint   Patient presents with     Musculoskeletal Problem     Follow up right foot pain, has ingrown toe nails bilaterally. Got new shows and brace for leg right side which has been effective.          Pain Assessment  Patient Currently in Pain: Yes  0-10 Pain Scale: 7  Primary Pain Location: Foot  Pain Orientation: Right, Left  Other Pain Locations:  (Right great toe and left great toe. )  Aggravating Factors:  (touching feet. )

## 2017-06-20 ENCOUNTER — TELEPHONE (OUTPATIENT)
Dept: FAMILY MEDICINE | Facility: CLINIC | Age: 57
End: 2017-06-20

## 2017-06-20 ENCOUNTER — E-VISIT (OUTPATIENT)
Dept: FAMILY MEDICINE | Facility: CLINIC | Age: 57
End: 2017-06-20
Payer: MEDICARE

## 2017-06-20 DIAGNOSIS — K04.7 TOOTH ABSCESS: Primary | ICD-10-CM

## 2017-06-20 DIAGNOSIS — E11.9 TYPE 2 DIABETES MELLITUS WITHOUT COMPLICATION, WITH LONG-TERM CURRENT USE OF INSULIN (H): ICD-10-CM

## 2017-06-20 DIAGNOSIS — Z79.4 TYPE 2 DIABETES MELLITUS WITHOUT COMPLICATION, WITH LONG-TERM CURRENT USE OF INSULIN (H): ICD-10-CM

## 2017-06-20 PROCEDURE — 99444 ZZC PHYSICIAN ONLINE EVALUATION & MANAGEMENT SERVICE: CPT | Performed by: FAMILY MEDICINE

## 2017-06-20 RX ORDER — CLINDAMYCIN HCL 300 MG
300 CAPSULE ORAL 4 TIMES DAILY
Qty: 56 CAPSULE | Refills: 0 | Status: SHIPPED | OUTPATIENT
Start: 2017-06-20 | End: 2017-10-03

## 2017-06-20 NOTE — TELEPHONE ENCOUNTER
Tell pt we will fill at 28 units daily so he doesn't run out in case we need to increase but can continue 24 for now if blood sugars good.     Joel Wegener,MD

## 2017-06-20 NOTE — TELEPHONE ENCOUNTER
Reason for Call:  Other prescription    Detailed comments: Patient let pharmacy know that for his insulin glargine (LANTUS SOLOSTAR) 100 UNIT/ML injection he is only doing 24 units under the skin. What they have in their system is 32 units. Please clarify, thank you!  [facility] pharmacy, [place]- [phone]    Phone Number Patient can be reached at: Home number on file [phone] (home)    Best Time: anytime    Can we leave a detailed message on this number? Not Applicable    Call taken on 6/20/2017 at 4:31 PM by Autumn Borja

## 2017-06-20 NOTE — TELEPHONE ENCOUNTER
Patient states he is injecting 24 units of Lantus, medication record we have says 28 units LAntus.  Pharmacy has prescription on record saying 32 units.  Please advise.  Harriett Garcia RN

## 2017-06-21 ENCOUNTER — OFFICE VISIT (OUTPATIENT)
Dept: ORTHOPEDICS | Facility: CLINIC | Age: 57
End: 2017-06-21

## 2017-06-21 DIAGNOSIS — M79.674 PAIN IN TOES OF BOTH FEET: ICD-10-CM

## 2017-06-21 DIAGNOSIS — L60.0 INGROWN NAIL: Primary | ICD-10-CM

## 2017-06-21 DIAGNOSIS — M79.675 PAIN IN TOES OF BOTH FEET: ICD-10-CM

## 2017-06-21 NOTE — PROGRESS NOTES
Chief Complaint:   Chief Complaint   Patient presents with     RECHECK     Pt stated that he has chronic ingrown toenails and that he is here for his toenails.      Allergies   Allergen Reactions     Lisinopril Anaphylaxis     Swollen tongue; inability to swallow; drooling; hives; swollen face, neck, angioedema     Acetaminophen      Hx of cirrhosis      Amlodipine      Swelling, hives, possible angioedema       Morphine      Hypotension     Bactrim [Sulfamethoxazole W/Trimethoprim] Rash     Levaquin Swelling and Rash     Swelling in lip and tongue felt thick     Subjective: Erwin is a 56 year old male who presents to the clinic today for a follow up of bilateral ingrown nails. He relates that his right toe was swollen for a few days after the removal of the medial nail spicule, but has since resolved. He still would like the ingrown nails removed because they have been intermittently painful for many years. The most painful sites are the Left medial hallux nail border and the Right lateral hallux nail border. Denies erythema, edema or purulent drainage to the nail borders currently.     Objective  Right hallux noted to have no s/s of infection. The medial and lateral borders are incurvated and painful with palpation. No purulence noted. Surrounding skin is erythematous from chronic nail irritation.   Left hallux medial border is also ingrown and painful. No s/s of infection to this digit noted.   Palpable DP and PT pulses BL feet.   Decreased protective sensation as demonstrated with the [place] Touch Test.    Assessment: Chronically ingrown nails    Plan:   - Pt seen and evaluated  - After discussion with the patient of the procedure, risks, benefits, complications, and expected outcome, a right total  nail avulsion was decided as the course of treatment. Consent was signed. After skin prep with EtOH, a local block was administered into the right  first toe consisting of  3cc's of  1% lidocaine. After anesthesia  was achieved, a tourniquet was applied for hemostasis. The digit was prepped and draped in sterile technique. A freer was used to separate the nail from the underlying bed and from the eponychium. The offending nail was then removed. The gutter was inspected for any remaining nail spicules, of which none were found. The area was flushed with saline and the tourniquet removed. The digit was covered with Bacitracin, Adaptic and a dry, sterile dressing. Pt tolerated the procedure and anesthesia well with no complications.     A left partial medial border nail avulsion was also performed. Consent was signed. After skin prep with EtOH, a local block was administered into the left  first toe consisting of  3cc's of  1% lidocaine. After anesthesia was achieved, a tourniquet was applied for hemostasis. The digit was prepped and draped in sterile technique. A freer was used to separate the nail from the underlying bed and from the eponychium. An English nail anvil was then used to cut the medial border(s) of the first toe to the eponychium. The offending nail border was then removed. The gutter was inspected for any remaining nail spicules, of which none were found. The area was flushed with saline and the tourniquet removed. The digit was covered with Bacitracin, Adaptic and a dry, sterile dressing. Pt tolerated the procedure and anesthesia well with no complications.   - Educated patient on dressing change instructions - Starting tomorrow, begin soaking the affected foot in a warm Epsom salt bath for 10 minutes. Dry and cover the toe with antibiotic ointment like Bacitracin and a bandaid. Do this for about 1 week or until the nail bed is healed (hardened, scab like skin).  - Pt to return to clinic in 2 weeks

## 2017-06-21 NOTE — NURSING NOTE
[facility]  [address] [place] [id number]  Dept: [phone]  ______________________________________________________________________________    Patient: Erwin Aguilar   : 1960   MRN: [id number]   2017    INVASIVE PROCEDURE SAFETY CHECKLIST    Date: 2017   Procedure: Right hallux temporary total nail avulsion. Left hallux temporary partial nail avulsion.   Patient Name: Erwin Aguilar  MRN: [id number]  YOB: 1960    Action: Complete sections as appropriate. Any discrepancy results in a HARD COPY until resolved.     PRE PROCEDURE:  Patient ID verified with 2 identifiers (name and  or MRN): Yes  Procedure and site verified with patient/designee (when able): Yes  Accurate consent documentation in medical record: Yes  H&P (or appropriate assessment) documented in medical record: Yes  H&P must be up to 20 days prior to procedure and updates within 24 hours of procedure as applicable: NA  Relevant diagnostic and radiology test results appropriately labeled and displayed as applicable: Yes  Procedure site(s) marked with provider initials: NA    TIMEOUT:  Time-Out performed immediately prior to starting procedure, including verbal and active participation of all team members addressing the following:Yes  * Correct patient identify  * Confirmed that the correct side and site are marked  * An accurate procedure consent form  * Agreement on the procedure to be done  * Correct patient position  * Relevant images and results are properly labeled and appropriately displayed  * The need to administer antibiotics or fluids for irrigation purposes during the procedure as applicable   * Safety precautions based on patient history or medication use    DURING PROCEDURE: Verification of correct person, site, and procedures any time the responsibility for care of the patient is transferred to another member of the care team.       The following  medications were given:     MEDICATION:  Lidocaine 1%  ROUTE: SQ  SITE: Right and left hallux.  DOSE: 3ml(left), 3ml(right)  LOT #: [id number]  : [employer]  EXPIRATION DATE: 3/21  NDC#: 27318-907-47   Was there drug waste? Yes  Amount of drug waste (mL): 14.  Reason for waste:  Single use vial        Heidi Kwan LPN  June 21, 2017

## 2017-06-21 NOTE — TELEPHONE ENCOUNTER
New rx was sent to pharmacy for the 28 units. This should inform them.    Pt reports that he takes 26 units daily. He has a stockpile of lantus at home. That is why he has not filled the rx in over one month.  RODERICK Gu

## 2017-06-21 NOTE — MR AVS SNAPSHOT
After Visit Summary   6/21/2017    Erwin Aguilar    MRN: [id number]           Patient Information     Date Of Birth          1960        Visit Information        Provider Department      6/21/2017 7:40 AM Victor M Anaya DPM [facility]        Today's Diagnoses     Ingrown nail    -  1    Pain in toes of both feet          Care Instructions    Dressing change instructions - Starting tomorrow, begin soaking the affected foot in a warm Epsom salt bath for 10 minutes. Dry and cover the toe with antibiotic ointment like Bacitracin and a bandaid. Do this for about 1 week or until the nail bed is healed (hardened, scab like skin).    Follow up in 2 weeks. Send a [employer] message or call our clinic with concerns [phone]          Follow-ups after your visit        Follow-up notes from your care team     Return in about 2 weeks (around 7/5/2017).      Your next 10 appointments already scheduled     Oct 03, 2017  8:15 AM CDT   Lab with  LAB   [facility] ([facility])    [address] [place] [id number] [phone]            Oct 03, 2017  9:20 AM CDT   (Arrive by 9:05 AM)   Return General Liver with Kimberly Ramirez MD   [facility] ([facility])    [address] [place] [address] [phone]              Who to contact     Please call your clinic at [phone] to:    Ask questions about your health    Make or cancel appointments    Discuss your medicines    Learn about your test results    Speak to your doctor   If you have compliments or concerns about an experience at your clinic, or if you wish to file a complaint, please contact [facility] Patient Relations at [phone] or email us at [email or web address]         Additional Information About Your Visit        MyChart Information     [employer] gives you  secure access to your electronic health record. If you see a primary care provider, you can also send messages to your care team and make appointments. If you have questions, please call your primary care clinic.  If you do not have a primary care provider, please call [phone] and they will assist you.      [employer] is an electronic gateway that provides easy, online access to your medical records. With [employer], you can request a clinic appointment, read your test results, renew a prescription or communicate with your care team.     To access your existing account, please contact your [facility] or call [phone] for assistance.        Care EveryWhere ID     This is your Care EveryWhere ID. This could be used by other organizations to access your [facility] medical records  [id number]         Blood Pressure from Last 3 Encounters:   05/09/17 136/78   04/11/17 167/83   03/23/17 144/83    Weight from Last 3 Encounters:   05/09/17 108 kg (238 lb)   04/11/17 100.7 kg (222 lb)   03/23/17 102.3 kg (225 lb 9.6 oz)              We Performed the Following     REMOVAL NAIL/NAIL BED, PARTIAL OR COMPLETE     REMOVAL OF NAIL PLATE EA ADDTL          Today's Medication Changes          These changes are accurate as of: 6/21/17  8:20 AM.  If you have any questions, ask your nurse or doctor.               These medicines have changed or have updated prescriptions.        Dose/Directions    potassium chloride SA 20 MEQ CR tablet   Commonly known as:  potassium chloride   This may have changed:  how much to take   Used for:  Hypokalemia        Dose:  60 mEq   Take 3 tablets (60 mEq) by mouth daily   Quantity:  180 tablet   Refills:  3                Primary Care Provider Office Phone # Fax #    Joel Daniel Irwin Wegener, [phone]       [facility] [address] [address]        Equal Access to Services     LI GENAO AH: sonia Snider  arcadiojair donis davila. So [facility] [phone].    ATENCIÓN: Si mariela fairbanks, tiene a camp disposición servicios gratuitos de asistencia lingüística. Neftaly al [phone].    We comply with applicable federal civil rights laws and [place] laws. We do not discriminate on the basis of race, color, national origin, age, disability sex, sexual orientation or gender identity.            Thank you!     Thank you for choosing [facility]  for your care. Our goal is always to provide you with excellent care. Hearing back from our patients is one way we can continue to improve our services. Please take a few minutes to complete the written survey that you may receive in the mail after your visit with us. Thank you!             Your Updated Medication List - Protect others around you: Learn how to safely use, store and throw away your medicines at www.disposemymeds.org.          This list is accurate as of: 6/21/17  8:20 AM.  Always use your most recent med list.                   Brand Name Dispense Instructions for use Diagnosis    * ACE/ARB NOT PRESCRIBED (INTENTIONAL)      ACE & ARB not prescribed due to Allergy        ACURA BLOOD GLUCOSE METER W/DEVICE Kit     1 kit    1 Dose 2 times daily    Type 2 diabetes, HbA1c goal < 7% (H)       albuterol 108 (90 BASE) MCG/ACT Inhaler    PROAIR HFA/PROVENTIL HFA/VENTOLIN HFA    3 Inhaler    Inhale 2 puffs into the lungs every 6 hours as needed for shortness of breath / dyspnea or wheezing Ventolin or other covered brand    Moderate persistent asthma without complication       Alcohol Pads 70 % Pads     100 each    1 Box 4 times daily    Type 2 diabetes, HbA1c goal < 7% (H)       baclofen 10 MG tablet    LIORESAL    180 tablet    Take 2 tablets (20 mg) by mouth 3 times daily    Muscle spasms of both lower extremities, Back muscle spasm       blood glucose monitoring lancets     1 Box    Use to test blood sugars  2 times daily or as directed.    Type 2 diabetes, HbA1c goal < 7% (H)       blood glucose monitoring test strip    no brand specified    1 Box    Use to test blood sugars one time daily or as directed  Accucheck Richards plus test strips    Type 2 diabetes, HbA1c goal < 7% (H)       * clindamycin 150 MG capsule    CLEOCIN    10 capsule    OPEN 1 CAP AND PLACE IN 1 LITER OF NORMAL SALINE & MIX. IRRIGATE WITH 20ML EACH NOSTRIL 4 TIMES/DAY    Chronic sinusitis, unspecified location       * clindamycin 300 MG capsule    CLEOCIN    56 capsule    Take 1 capsule (300 mg) by mouth 4 times daily    Tooth abscess       COMPRESSION STOCKINGS     2 each    Knee high compression socks 30-40-mm    Lymphedema of both lower extremities       hydrochlorothiazide 25 MG tablet    HYDRODIURIL    90 tablet    Take 1 tablet (25 mg) by mouth daily    Hypertension goal BP (blood pressure) < 140/90       insulin glargine 100 UNIT/ML injection    LANTUS SOLOSTAR    15 mL    Inject 28 units under the skin daily.    Type 2 diabetes mellitus without complication, with long-term current use of insulin (H)       insulin pen needle 32G X 4 MM    BD ROSE U/F    90 each    Use one daily as directed.  Bd ultra fine pen needles rose    Type 2 diabetes mellitus without complication (H)       mupirocin 2 % ointment    BACTROBAN    2 g    Apply to anterior nares BID X 2 month supply    Nasal lesion, Nasal vestibulitis       omeprazole 40 MG capsule    priLOSEC    90 capsule    Take 1 capsule (40 mg) by mouth daily Take 30-60 minutes before a meal.    Gastroesophageal reflux disease without esophagitis       * order for DME     1 Device    Equipment being ordered: BP cuff    Hypertension goal BP (blood pressure) < 140/90       order for DME     1 Device    Equipment being ordered: one donut for sitting    Pressure ulcer, stage II       potassium chloride SA 20 MEQ CR tablet    potassium chloride    180 tablet    Take 3 tablets (60 mEq) by mouth daily     Hypokalemia       promethazine 25 MG tablet    PHENERGAN    40 tablet    Take 1 tablet (25 mg) by mouth every 6 hours as needed for nausea    Nausea without vomiting       propranolol 20 MG tablet    INDERAL    270 tablet    Take 1 tablet (20 mg) by mouth 3 times daily    Esophageal varices in cirrhosis (H)       sertraline 100 MG tablet    ZOLOFT    180 tablet    Take 2 tablets (200 mg) by mouth daily    Generalized anxiety disorder       sodium chloride 0.9 % Soln     99106 mL    Irrigate 20 cc each nostril QID X 2 month supply    Chronic maxillary sinusitis       Syringe (Disposable) 25 ML Misc     1 each    Syringe to be used for nasal irrigation    Chronic maxillary sinusitis       Urea 20 % Crea cream     480 g    Externally apply topically daily    Tyloma       * Notice:  This list has 4 medication(s) that are the same as other medications prescribed for you. Read the directions carefully, and ask your doctor or other care provider to review them with you.

## 2017-06-21 NOTE — PATIENT INSTRUCTIONS
Dressing change instructions - Starting tomorrow, begin soaking the affected foot in a warm Epsom salt bath for 10 minutes. Dry and cover the toe with antibiotic ointment like Bacitracin and a bandaid. Do this for about 1 week or until the nail bed is healed (hardened, scab like skin).    Follow up in 2 weeks. Send a [employer] message or call our clinic with concerns [phone]

## 2017-06-21 NOTE — LETTER
6/21/2017       RE: Erwin Aguilar  [address]  [address]  [place] [address]     Dear Colleague,    Thank you for referring your patient, Erwin Aguilar, to the [facility] at [facility]. Please see a copy of my visit note below.    Chief Complaint:   Chief Complaint   Patient presents with     RECHECK     Pt stated that he has chronic ingrown toenails and that he is here for his toenails.      Allergies   Allergen Reactions     Lisinopril Anaphylaxis     Swollen tongue; inability to swallow; drooling; hives; swollen face, neck, angioedema     Acetaminophen      Hx of cirrhosis      Amlodipine      Swelling, hives, possible angioedema       Morphine      Hypotension     Bactrim [Sulfamethoxazole W/Trimethoprim] Rash     Levaquin Swelling and Rash     Swelling in lip and tongue felt thick     Subjective: Erwin is a 56 year old male who presents to the clinic today for a follow up of bilateral ingrown nails. He relates that his right toe was swollen for a few days after the removal of the medial nail spicule, but has since resolved. He still would like the ingrown nails removed because they have been intermittently painful for many years. The most painful sites are the Left medial hallux nail border and the Right lateral hallux nail border. Denies erythema, edema or purulent drainage to the nail borders currently.     Objective  Right hallux noted to have no s/s of infection. The medial and lateral borders are incurvated and painful with palpation. No purulence noted. Surrounding skin is erythematous from chronic nail irritation.   Left hallux medial border is also ingrown and painful. No s/s of infection to this digit noted.   Palpable DP and PT pulses BL feet.   Decreased protective sensation as demonstrated with the [place] Touch Test.    Assessment: Chronically ingrown nails    Plan:   - Pt seen and evaluated  - After discussion with the patient of the  procedure, risks, benefits, complications, and expected outcome, a right total  nail avulsion was decided as the course of treatment. Consent was signed. After skin prep with EtOH, a local block was administered into the right  first toe consisting of  3cc's of  1% lidocaine. After anesthesia was achieved, a tourniquet was applied for hemostasis. The digit was prepped and draped in sterile technique. A freer was used to separate the nail from the underlying bed and from the eponychium. The offending nail was then removed. The gutter was inspected for any remaining nail spicules, of which none were found. The area was flushed with saline and the tourniquet removed. The digit was covered with Bacitracin, Adaptic and a dry, sterile dressing. Pt tolerated the procedure and anesthesia well with no complications.     A left partial medial border nail avulsion was also performed. Consent was signed. After skin prep with EtOH, a local block was administered into the left  first toe consisting of  3cc's of  1% lidocaine. After anesthesia was achieved, a tourniquet was applied for hemostasis. The digit was prepped and draped in sterile technique. A freer was used to separate the nail from the underlying bed and from the eponychium. An English nail anvil was then used to cut the medial border(s) of the first toe to the eponychium. The offending nail border was then removed. The gutter was inspected for any remaining nail spicules, of which none were found. The area was flushed with saline and the tourniquet removed. The digit was covered with Bacitracin, Adaptic and a dry, sterile dressing. Pt tolerated the procedure and anesthesia well with no complications.   - Educated patient on dressing change instructions - Starting tomorrow, begin soaking the affected foot in a warm Epsom salt bath for 10 minutes. Dry and cover the toe with antibiotic ointment like Bacitracin and a bandaid. Do this for about 1 week or until the nail bed  is healed (hardened, scab like skin).  - Pt to return to clinic in 2 weeks    Again, thank you for allowing me to participate in the care of your patient.      Sincerely,    Victor M Anaya DPM

## 2017-06-21 NOTE — NURSING NOTE
Reason For Visit:   Chief Complaint   Patient presents with     RECHECK     Pt stated that he has chronic ingrown toenails and that he is here for his toenails.        Pain Assessment  Patient Currently in Pain: Denies           Current Outpatient Prescriptions   Medication Sig Dispense Refill     clindamycin (CLEOCIN) 300 MG capsule Take 1 capsule (300 mg) by mouth 4 times daily 56 capsule 0     insulin glargine (LANTUS SOLOSTAR) 100 UNIT/ML injection Inject 28 units under the skin daily. 15 mL 11     propranolol (INDERAL) 20 MG tablet Take 1 tablet (20 mg) by mouth 3 times daily 270 tablet 3     clindamycin (CLEOCIN) 150 MG capsule OPEN 1 CAP AND PLACE IN 1 LITER OF NORMAL SALINE & MIX. IRRIGATE WITH 20ML EACH NOSTRIL 4 TIMES/DAY 10 capsule 2     sertraline (ZOLOFT) 100 MG tablet Take 2 tablets (200 mg) by mouth daily 180 tablet 1     Urea 20 % CREA Externally apply topically daily 480 g 1     mupirocin (BACTROBAN) 2 % ointment Apply to anterior nares BID X 2 month supply 2 g 3     hydrochlorothiazide (HYDRODIURIL) 25 MG tablet Take 1 tablet (25 mg) by mouth daily 90 tablet 3     promethazine (PHENERGAN) 25 MG tablet Take 1 tablet (25 mg) by mouth every 6 hours as needed for nausea 40 tablet 11     order for DME Equipment being ordered: one donut for sitting 1 Device 0     sodium chloride 0.9 % SOLN Irrigate 20 cc each nostril QID X 2 month supply 39847 mL 3     blood glucose monitoring (NO BRAND SPECIFIED) test strip Use to test blood sugars one time daily or as directed    Accucheck Richards plus test strips 1 Box 5     albuterol (PROAIR HFA, PROVENTIL HFA, VENTOLIN HFA) 108 (90 BASE) MCG/ACT inhaler Inhale 2 puffs into the lungs every 6 hours as needed for shortness of breath / dyspnea or wheezing Ventolin or other covered brand 3 Inhaler 3     insulin pen needle (BD ROSE U/F) 32G X 4 MM Use one daily as directed.    Bd ultra fine pen needles rose 90 each 3     Syringe, Disposable, 25 ML MISC Syringe to be used  for nasal irrigation 1 each 3     baclofen (LIORESAL) 10 MG tablet Take 2 tablets (20 mg) by mouth 3 times daily 180 tablet 12     potassium chloride SA (POTASSIUM CHLORIDE) 20 MEQ tablet Take 3 tablets (60 mEq) by mouth daily (Patient taking differently: Take 40 mEq by mouth daily ) 180 tablet 3     omeprazole (PRILOSEC) 40 MG capsule Take 1 capsule (40 mg) by mouth daily Take 30-60 minutes before a meal. 90 capsule 3     Alcohol Swabs (ALCOHOL PADS) 70 % PADS 1 Box 4 times daily 100 each 3     COMPRESSION STOCKINGS Knee high compression socks 30-40-mm 2 each 1     blood glucose monitoring (FREESTYLE) lancets Use to test blood sugars 2 times daily or as directed. 1 Box 3     Blood Glucose Monitoring Suppl (ACURA BLOOD GLUCOSE METER) W/DEVICE KIT 1 Dose 2 times daily 1 kit 1     ORDER FOR DME Equipment being ordered: BP cuff 1 Device 0     ACE/ARB NOT PRESCRIBED, INTENTIONAL, ACE & ARB not prescribed due to Allergy            Allergies   Allergen Reactions     Lisinopril Anaphylaxis     Swollen tongue; inability to swallow; drooling; hives; swollen face, neck, angioedema     Acetaminophen      Hx of cirrhosis      Amlodipine      Swelling, hives, possible angioedema       Morphine      Hypotension     Bactrim [Sulfamethoxazole W/Trimethoprim] Rash     Levaquin Swelling and Rash     Swelling in lip and tongue felt thick

## 2017-07-17 ENCOUNTER — OFFICE VISIT (OUTPATIENT)
Dept: ORTHOPEDICS | Facility: CLINIC | Age: 57
End: 2017-07-17

## 2017-07-17 DIAGNOSIS — M79.675 PAIN OF TOE OF LEFT FOOT: ICD-10-CM

## 2017-07-17 DIAGNOSIS — L60.0 ONYCHOCRYPTOSIS: Primary | ICD-10-CM

## 2017-07-17 NOTE — LETTER
7/17/2017       RE: Erwin Aguilar  [address]  [place] [address]     Dear Colleague,    Thank you for referring your patient, Erwin Aguilar, to the [facility] at [facility]. Please see a copy of my visit note below.    Chief Complaint:   Chief Complaint   Patient presents with     RECHECK     Follow up. Right hallux temporary total nail avulsion. Left hallux temporary partial nail avulison. Pt stated that the left one is still digging into his foot and thinks that it needs to go.           Allergies   Allergen Reactions     Lisinopril Anaphylaxis     Swollen tongue; inability to swallow; drooling; hives; swollen face, neck, angioedema     Acetaminophen      Hx of cirrhosis      Amlodipine      Swelling, hives, possible angioedema       Morphine      Hypotension     Bactrim [Sulfamethoxazole W/Trimethoprim] Rash     Levaquin Swelling and Rash     Swelling in lip and tongue felt thick         Subjective: Erwin is a 56 year old male who presents to the clinic today for a follow up of right total nail avulsion and left partial nail avulsion. He relates to me today that the right foot is feeling well and did not have any signs or symptoms of infection. He is having no pain today in that big toe of the right foot. He tells me that on the left foot where the nail avulsion was, that is pain-free today. However, he relates to me that he is having pain in the lateral border of the left big toe. He did not have this area previously avulsed. He relates to me that he does not think it's infected but it is causing him pain.    Objective    Right nailbed is completely healed with no s/s of infection noted today.  The left digital partial nail avulsion is healed well with no s/s of infection at the medial border. The lateral border is painful to the touch at the eponychium without s/s of infection noted to the area.   Palpable DP and PT pulses BL.        Assessment: Healed right total hallux nail avulsion and medial left hallux nail avulsion sites. New pain in the left lateral border - onychocryptosis.     Plan:   - Pt seen and evaluated  - After discussion with the patient of the procedure, risks, benefits, complications, and expected outcome, a left partial lateral border nail avulsion was decided as the course of treatment. Consent was signed. After skin prep with EtOH, a local block was administered into the left  first toe consisting of  3cc's of  1% lidocaine, without epi. After anesthesia was achieved, a tourniquet was applied for hemostasis. The digit was prepped and draped in sterile technique. A freer was used to separate the nail from the underlying bed and from the eponychium. An English nail anvil was then used to cut the lateral border(s) of the first toe to the eponychium. The offending nail border was then removed. The gutter was inspected for any remaining nail spicules, of which none were found. The area was flushed with saline and the tourniquet removed. The digit was covered with Bacitracin, Adaptic and a dry, sterile dressing. Pt tolerated the procedure and anesthesia well with no complications. Pt to change the dressing tomorrow with antibiotic ointment and a bandaid, after Epsom salt bath. Will return to clinic in 10 to 14 days.     Again, thank you for allowing me to participate in the care of your patient.      Sincerely,    Victor M Anaya DPM

## 2017-07-17 NOTE — PROGRESS NOTES
Chief Complaint:   Chief Complaint   Patient presents with     RECHECK     Follow up. Right hallux temporary total nail avulsion. Left hallux temporary partial nail avulison. Pt stated that the left one is still digging into his foot and thinks that it needs to go.           Allergies   Allergen Reactions     Lisinopril Anaphylaxis     Swollen tongue; inability to swallow; drooling; hives; swollen face, neck, angioedema     Acetaminophen      Hx of cirrhosis      Amlodipine      Swelling, hives, possible angioedema       Morphine      Hypotension     Bactrim [Sulfamethoxazole W/Trimethoprim] Rash     Levaquin Swelling and Rash     Swelling in lip and tongue felt thick         Subjective: Erwin is a 56 year old male who presents to the clinic today for a follow up of right total nail avulsion and left partial nail avulsion. He relates to me today that the right foot is feeling well and did not have any signs or symptoms of infection. He is having no pain today in that big toe of the right foot. He tells me that on the left foot where the nail avulsion was, that is pain-free today. However, he relates to me that he is having pain in the lateral border of the left big toe. He did not have this area previously avulsed. He relates to me that he does not think it's infected but it is causing him pain.    Objective    Right nailbed is completely healed with no s/s of infection noted today.  The left digital partial nail avulsion is healed well with no s/s of infection at the medial border. The lateral border is painful to the touch at the eponychium without s/s of infection noted to the area.   Palpable DP and PT pulses BL.       Assessment: Healed right total hallux nail avulsion and medial left hallux nail avulsion sites. New pain in the left lateral border - onychocryptosis.     Plan:   - Pt seen and evaluated  - After discussion with the patient of the procedure, risks, benefits, complications, and expected outcome, a  left partial lateral border nail avulsion was decided as the course of treatment. Consent was signed. After skin prep with EtOH, a local block was administered into the left  first toe consisting of  3cc's of  1% lidocaine, without epi. After anesthesia was achieved, a tourniquet was applied for hemostasis. The digit was prepped and draped in sterile technique. A freer was used to separate the nail from the underlying bed and from the eponychium. An English nail anvil was then used to cut the lateral border(s) of the first toe to the eponychium. The offending nail border was then removed. The gutter was inspected for any remaining nail spicules, of which none were found. The area was flushed with saline and the tourniquet removed. The digit was covered with Bacitracin, Adaptic and a dry, sterile dressing. Pt tolerated the procedure and anesthesia well with no complications. Pt to change the dressing tomorrow with antibiotic ointment and a bandaid, after Epsom salt bath. Will return to clinic in 10 to 14 days.

## 2017-07-17 NOTE — MR AVS SNAPSHOT
After Visit Summary   7/17/2017    Erwin Aguilar    MRN: [id number]           Patient Information     Date Of Birth          1960        Visit Information        Provider Department      7/17/2017 9:00 AM Victor M Anaya DPM [facility]        Today's Diagnoses     Onychocryptosis    -  1    Pain of toe of left foot           Follow-ups after your visit        Your next 10 appointments already scheduled     Jul 31, 2017  9:40 AM CDT   (Arrive by 9:25 AM)   RETURN FOOT/ANKLE with Victor M Anaya DPM   [facility] ([facility])    [address]  4th Floor  [facility] [id number] [phone]            Oct 03, 2017  8:15 AM CDT   Lab with  LAB   [facility] ([facility])    [address]  1st Floor  [facility] [id number] [phone]            Oct 03, 2017  9:20 AM CDT   (Arrive by 9:05 AM)   Return General Liver with Kimberly Ramirez MD   [facility] ([facility])    [address] [place] [address]  3rd Floor  [facility] [id number] [phone]              Who to contact     Please call your clinic at [phone] to:    Ask questions about your health    Make or cancel appointments    Discuss your medicines    Learn about your test results    Speak to your doctor   If you have compliments or concerns about an experience at your clinic, or if you wish to file a complaint, please contact [facility] Patient Relations at [phone] or email us at [email or web address]         Additional Information About Your Visit        MyChart Information     [employer] gives you secure access to your electronic health record. If you see a primary care provider, you can also send messages to your care team and make appointments. If you have questions, please call your primary care clinic.  If you do not have a  primary care provider, please call [phone] and they will assist you.      [employer] (formerly [employer]) is an electronic gateway that provides easy, online access to your medical records. With [employer] (formerly [employer]), you can request a clinic appointment, read your test results, renew a prescription or communicate with your care team.     To access your existing account, please contact your [facility] or call [phone] for assistance.        Care EveryWhere ID     This is your Care EveryWhere ID. This could be used by other organizations to access your [place] medical records  [id number]         Blood Pressure from Last 3 Encounters:   05/09/17 136/78   04/11/17 167/83   03/23/17 144/83    Weight from Last 3 Encounters:   05/09/17 108 kg (238 lb)   04/11/17 100.7 kg (222 lb)   03/23/17 102.3 kg (225 lb 9.6 oz)              We Performed the Following     REMOVAL NAIL/NAIL BED, PARTIAL OR COMPLETE          Today's Medication Changes          These changes are accurate as of: 7/17/17  9:34 AM.  If you have any questions, ask your nurse or doctor.               These medicines have changed or have updated prescriptions.        Dose/Directions    potassium chloride SA 20 MEQ CR tablet   Commonly known as:  potassium chloride   This may have changed:  how much to take   Used for:  Hypokalemia        Dose:  60 mEq   Take 3 tablets (60 mEq) by mouth daily   Quantity:  180 tablet   Refills:  3                Primary Care Provider Office Phone # Fax #    Joel Daniel Irwin Wegener, [phone]       [place] [address]        Equal Access to Services     LI GENAO : Hadii mary chatterjee Solissett, waaxda luqadaha, qaybta kaalmada donis brock. So [facility] [phone].    ATENCIÓN: Si habla español, tiene a camp disposición servicios gratuitos de asistencia lingüística. Llame al [phone].    We comply with applicable federal civil  rights laws and [place] laws. We do not discriminate on the basis of race, color, national origin, age, disability sex, sexual orientation or gender identity.            Thank you!     Thank you for choosing [facility]  for your care. Our goal is always to provide you with excellent care. Hearing back from our patients is one way we can continue to improve our services. Please take a few minutes to complete the written survey that you may receive in the mail after your visit with us. Thank you!             Your Updated Medication List - Protect others around you: Learn how to safely use, store and throw away your medicines at www.disposemymeds.org.          This list is accurate as of: 7/17/17  9:34 AM.  Always use your most recent med list.                   Brand Name Dispense Instructions for use Diagnosis    * ACE/ARB NOT PRESCRIBED (INTENTIONAL)      ACE & ARB not prescribed due to Allergy        ACURA BLOOD GLUCOSE METER W/DEVICE Kit     1 kit    1 Dose 2 times daily    Type 2 diabetes, HbA1c goal < 7% (H)       albuterol 108 (90 BASE) MCG/ACT Inhaler    PROAIR HFA/PROVENTIL HFA/VENTOLIN HFA    3 Inhaler    Inhale 2 puffs into the lungs every 6 hours as needed for shortness of breath / dyspnea or wheezing Ventolin or other covered brand    Moderate persistent asthma without complication       Alcohol Pads 70 % Pads     100 each    1 Box 4 times daily    Type 2 diabetes, HbA1c goal < 7% (H)       baclofen 10 MG tablet    LIORESAL    180 tablet    Take 2 tablets (20 mg) by mouth 3 times daily    Muscle spasms of both lower extremities, Back muscle spasm       blood glucose monitoring lancets     1 Box    Use to test blood sugars 2 times daily or as directed.    Type 2 diabetes, HbA1c goal < 7% (H)       blood glucose monitoring test strip    no brand specified    1 Box    Use to test blood sugars one time daily or as directed  Accucheck Richards plus test strips    Type 2 diabetes, HbA1c goal <  7% (H)       * clindamycin 150 MG capsule    CLEOCIN    10 capsule    OPEN 1 CAP AND PLACE IN 1 LITER OF NORMAL SALINE & MIX. IRRIGATE WITH 20ML EACH NOSTRIL 4 TIMES/DAY    Chronic sinusitis, unspecified location       * clindamycin 300 MG capsule    CLEOCIN    56 capsule    Take 1 capsule (300 mg) by mouth 4 times daily    Tooth abscess       COMPRESSION STOCKINGS     2 each    Knee high compression socks 30-40-mm    Lymphedema of both lower extremities       hydrochlorothiazide 25 MG tablet    HYDRODIURIL    90 tablet    Take 1 tablet (25 mg) by mouth daily    Hypertension goal BP (blood pressure) < 140/90       insulin glargine 100 UNIT/ML injection    LANTUS SOLOSTAR    15 mL    Inject 28 units under the skin daily.    Type 2 diabetes mellitus without complication, with long-term current use of insulin (H)       insulin pen needle 32G X 4 MM    BD ROSE U/F    90 each    Use one daily as directed.  Bd ultra fine pen needles rose    Type 2 diabetes mellitus without complication (H)       mupirocin 2 % ointment    BACTROBAN    2 g    Apply to anterior nares BID X 2 month supply    Nasal lesion, Nasal vestibulitis       omeprazole 40 MG capsule    priLOSEC    90 capsule    Take 1 capsule (40 mg) by mouth daily Take 30-60 minutes before a meal.    Gastroesophageal reflux disease without esophagitis       * order for DME     1 Device    Equipment being ordered: BP cuff    Hypertension goal BP (blood pressure) < 140/90       order for DME     1 Device    Equipment being ordered: one donut for sitting    Pressure ulcer, stage II       potassium chloride SA 20 MEQ CR tablet    potassium chloride    180 tablet    Take 3 tablets (60 mEq) by mouth daily    Hypokalemia       promethazine 25 MG tablet    PHENERGAN    40 tablet    Take 1 tablet (25 mg) by mouth every 6 hours as needed for nausea    Nausea without vomiting       propranolol 20 MG tablet    INDERAL    270 tablet    Take 1 tablet (20 mg) by mouth 3 times daily     Esophageal varices in cirrhosis (H)       sertraline 100 MG tablet    ZOLOFT    180 tablet    Take 2 tablets (200 mg) by mouth daily    Generalized anxiety disorder       sodium chloride 0.9 % Soln     94171 mL    Irrigate 20 cc each nostril QID X 2 month supply    Chronic maxillary sinusitis       Syringe (Disposable) 25 ML Misc     1 each    Syringe to be used for nasal irrigation    Chronic maxillary sinusitis       Urea 20 % Crea cream     480 g    Externally apply topically daily    Tyloma       * Notice:  This list has 4 medication(s) that are the same as other medications prescribed for you. Read the directions carefully, and ask your doctor or other care provider to review them with you.

## 2017-07-17 NOTE — NURSING NOTE
Reason For Visit:   Chief Complaint   Patient presents with     RECHECK     Follow up. Right hallux temporary total nail avulsion. Left hallux temporary partial nail avulison. Pt stated that the left one is still digging into his foot and thinks that it needs to go.        Pain Assessment  Patient Currently in Pain: Denies               Current Outpatient Prescriptions   Medication Sig Dispense Refill     clindamycin (CLEOCIN) 300 MG capsule Take 1 capsule (300 mg) by mouth 4 times daily 56 capsule 0     insulin glargine (LANTUS SOLOSTAR) 100 UNIT/ML injection Inject 28 units under the skin daily. 15 mL 11     propranolol (INDERAL) 20 MG tablet Take 1 tablet (20 mg) by mouth 3 times daily 270 tablet 3     clindamycin (CLEOCIN) 150 MG capsule OPEN 1 CAP AND PLACE IN 1 LITER OF NORMAL SALINE & MIX. IRRIGATE WITH 20ML EACH NOSTRIL 4 TIMES/DAY 10 capsule 2     sertraline (ZOLOFT) 100 MG tablet Take 2 tablets (200 mg) by mouth daily 180 tablet 1     Urea 20 % CREA Externally apply topically daily 480 g 1     mupirocin (BACTROBAN) 2 % ointment Apply to anterior nares BID X 2 month supply 2 g 3     hydrochlorothiazide (HYDRODIURIL) 25 MG tablet Take 1 tablet (25 mg) by mouth daily 90 tablet 3     promethazine (PHENERGAN) 25 MG tablet Take 1 tablet (25 mg) by mouth every 6 hours as needed for nausea 40 tablet 11     order for DME Equipment being ordered: one donut for sitting 1 Device 0     sodium chloride 0.9 % SOLN Irrigate 20 cc each nostril QID X 2 month supply 18123 mL 3     blood glucose monitoring (NO BRAND SPECIFIED) test strip Use to test blood sugars one time daily or as directed    Accucheck Richards plus test strips 1 Box 5     albuterol (PROAIR HFA, PROVENTIL HFA, VENTOLIN HFA) 108 (90 BASE) MCG/ACT inhaler Inhale 2 puffs into the lungs every 6 hours as needed for shortness of breath / dyspnea or wheezing Ventolin or other covered brand 3 Inhaler 3     insulin pen needle (BD ROSE U/F) 32G X 4 MM Use one daily as  directed.    Bd ultra fine pen needles ubaldo 90 each 3     Syringe, Disposable, 25 ML MISC Syringe to be used for nasal irrigation 1 each 3     baclofen (LIORESAL) 10 MG tablet Take 2 tablets (20 mg) by mouth 3 times daily 180 tablet 12     potassium chloride SA (POTASSIUM CHLORIDE) 20 MEQ tablet Take 3 tablets (60 mEq) by mouth daily (Patient taking differently: Take 40 mEq by mouth daily ) 180 tablet 3     omeprazole (PRILOSEC) 40 MG capsule Take 1 capsule (40 mg) by mouth daily Take 30-60 minutes before a meal. 90 capsule 3     Alcohol Swabs (ALCOHOL PADS) 70 % PADS 1 Box 4 times daily 100 each 3     COMPRESSION STOCKINGS Knee high compression socks 30-40-mm 2 each 1     blood glucose monitoring (FREESTYLE) lancets Use to test blood sugars 2 times daily or as directed. 1 Box 3     Blood Glucose Monitoring Suppl (ACURA BLOOD GLUCOSE METER) W/DEVICE KIT 1 Dose 2 times daily 1 kit 1     ORDER FOR DME Equipment being ordered: BP cuff 1 Device 0     ACE/ARB NOT PRESCRIBED, INTENTIONAL, ACE & ARB not prescribed due to Allergy            Allergies   Allergen Reactions     Lisinopril Anaphylaxis     Swollen tongue; inability to swallow; drooling; hives; swollen face, neck, angioedema     Acetaminophen      Hx of cirrhosis      Amlodipine      Swelling, hives, possible angioedema       Morphine      Hypotension     Bactrim [Sulfamethoxazole W/Trimethoprim] Rash     Levaquin Swelling and Rash     Swelling in lip and tongue felt thick

## 2017-07-17 NOTE — NURSING NOTE
[facility]  [address] [place] [id number]  Dept: [phone]  ______________________________________________________________________________    Patient: Erwin Aguilar   : 1960   MRN: [id number]   2017    INVASIVE PROCEDURE SAFETY CHECKLIST    Date: 2017   Procedure: Left Hallux latral border partial, temporary nail avulsion.   Patient Name: Erwin Aguilar  MRN: [id number]  YOB: 1960    Action: Complete sections as appropriate. Any discrepancy results in a HARD COPY until resolved.     PRE PROCEDURE:  Patient ID verified with 2 identifiers (name and  or MRN): Yes  Procedure and site verified with patient/designee (when able): Yes  Accurate consent documentation in medical record: Yes  H&P (or appropriate assessment) documented in medical record: Yes  H&P must be up to 20 days prior to procedure and updates within 24 hours of procedure as applicable: NA  Relevant diagnostic and radiology test results appropriately labeled and displayed as applicable: Yes  Procedure site(s) marked with provider initials: NA    TIMEOUT:  Time-Out performed immediately prior to starting procedure, including verbal and active participation of all team members addressing the following:Yes  * Correct patient identify  * Confirmed that the correct side and site are marked  * An accurate procedure consent form  * Agreement on the procedure to be done  * Correct patient position  * Relevant images and results are properly labeled and appropriately displayed  * The need to administer antibiotics or fluids for irrigation purposes during the procedure as applicable   * Safety precautions based on patient history or medication use    DURING PROCEDURE: Verification of correct person, site, and procedures any time the responsibility for care of the patient is transferred to another member of the care team.       The following medications were given:      MEDICATION:  Lidocaine 1%  ROUTE: SQ  SITE: Left 1st toe  DOSE: 200 mg per 20 ml (3ml used)  LOT #: [id number]  : [employer]  EXPIRATION DATE: 03/21  NDC#: 32818-383-17   Was there drug waste? Yes  Amount of drug waste (mL): 17.  Reason for waste:  Single use vial          Heidi Kwan LPN  July 17, 2017

## 2017-07-25 DIAGNOSIS — E11.9 TYPE 2 DIABETES MELLITUS WITHOUT COMPLICATION (H): ICD-10-CM

## 2017-07-26 ENCOUNTER — NURSE TRIAGE (OUTPATIENT)
Dept: NURSING | Facility: CLINIC | Age: 57
End: 2017-07-26

## 2017-07-26 NOTE — TELEPHONE ENCOUNTER
Erwin is needing a prescription for pen needles for insulin.  No triage.  FNA warm transferred through to [facility] direct.

## 2017-07-26 NOTE — TELEPHONE ENCOUNTER
Refused Prescriptions:                       Disp   Refills    insulin pen needle (BD UBALDO U/F) 32G X 4 MM0.1 ea*0        Sig: DUPLICATE.  insulin pen needle (BD UBALDO U/F) 32G X 4           MM, 90 each, 3 refills 9/9/2016  Refused By: NAFISA ALLRED  Reason for Refusal: Patient has requested refill too soon  Reason for Refusal Comment: insulin pen ndl (BD UBALDO U/F) 32G X 4 MM, 90 each, 3 refills 9/9/2016    insulin pen needle (BD UBALDO U/F) 32G X 4 MM      Last Written Prescription Date: 9/9/2016  Last Fill Quantity: 90,  # refills: 3   Last Office Visit with [facility], [facility] or [facility] prescribing provider: 5/9/2017     Patient states he transferred all refills to [facility] from [facility] - has 2 pen needles left     Prescription approved per [facility] Refill Protocol.    Signed Prescriptions:                        Disp   Refills    insulin pen needle (BD UBALDO U/F) 32G X 4 MM90 each2        Sig: Use one daily as directed.    Bd ultra fine pen           needles ubaldo  Authorizing Provider: WEGENER, JOEL DANIEL IRWIN  Ordering User: KLAUDIA ARRIAZA    Refused Prescriptions:                       Disp   Refills    insulin pen needle (BD UBALDO U/F) 32G X 4 MM0.1 ea*0        Sig: DUPLICATE.  insulin pen needle (BD UBALDO U/F) 32G X 4           MM, 90 each, 3 refills 9/9/2016  Refused By: NAFISA ALLRED  Reason for Refusal: Patient has requested refill too soon  Reason for Refusal Comment: insulin pen ndl (BD UBALDO U/F) 32G X 4 MM, 90 each, 3 refills 9/9/2016      Closing encounter - no further actions needed at this time    Klaudia Arriaza RN

## 2017-07-27 ENCOUNTER — MYC MEDICAL ADVICE (OUTPATIENT)
Dept: FAMILY MEDICINE | Facility: CLINIC | Age: 57
End: 2017-07-27

## 2017-07-27 DIAGNOSIS — E11.9 TYPE 2 DIABETES MELLITUS WITHOUT COMPLICATION (H): ICD-10-CM

## 2017-07-31 ENCOUNTER — OFFICE VISIT (OUTPATIENT)
Dept: WOUND CARE | Facility: CLINIC | Age: 57
End: 2017-07-31

## 2017-07-31 DIAGNOSIS — S31.819A WOUND, OPEN, BUTTOCK, RIGHT, INITIAL ENCOUNTER: Primary | ICD-10-CM

## 2017-07-31 NOTE — MR AVS SNAPSHOT
After Visit Summary   7/31/2017    Erwin Aguilar    MRN: [id number]           Patient Information     Date Of Birth          1960        Visit Information        Provider Department      7/31/2017 8:30 AM Roland Chapin RN [facility] Wound Ostomy        Today's Diagnoses     Wound, open, buttock, right    -  1       Follow-ups after your visit        Your next 10 appointments already scheduled     Oct 03, 2017  8:15 AM CDT   Lab with  LAB   [facility] Lab ([facility])    [address] [place] [address] [phone]            Oct 03, 2017  9:20 AM CDT   (Arrive by 9:05 AM)   Return General Liver with Kimberly Ramirez MD   [facility] Hepatology ([facility])    [address] [place] [address] [phone]              Who to contact     Please call your clinic at [phone] to:    Ask questions about your health    Make or cancel appointments    Discuss your medicines    Learn about your test results    Speak to your doctor   If you have compliments or concerns about an experience at your clinic, or if you wish to file a complaint, please contact [facility] Patient Relations at [phone] or email us at [email or web address]         Additional Information About Your Visit        MyChart Information     [employer] gives you secure access to your electronic health record. If you see a primary care provider, you can also send messages to your care team and make appointments. If you have questions, please call your primary care clinic.  If you do not have a primary care provider, please call [phone] and they will assist you.      [employer] is an electronic gateway that provides easy, online access to your medical records. With [employer], you can request a clinic appointment, read your test results, renew a prescription or communicate with your  care team.     To access your existing account, please contact your [facility] or call [phone] for assistance.        Care EveryWhere ID     This is your Care EveryWhere ID. This could be used by other organizations to access your [facility] medical records  [id number]         Blood Pressure from Last 3 Encounters:   05/09/17 136/78   04/11/17 167/83   03/23/17 144/83    Weight from Last 3 Encounters:   05/09/17 108 kg (238 lb)   04/11/17 100.7 kg (222 lb)   03/23/17 102.3 kg (225 lb 9.6 oz)              Today, you had the following     No orders found for display         Today's Medication Changes          These changes are accurate as of: 7/31/17  9:04 AM.  If you have any questions, ask your nurse or doctor.               These medicines have changed or have updated prescriptions.        Dose/Directions    potassium chloride SA 20 MEQ CR tablet   Commonly known as:  potassium chloride   This may have changed:  how much to take   Used for:  Hypokalemia        Dose:  60 mEq   Take 3 tablets (60 mEq) by mouth daily   Quantity:  180 tablet   Refills:  3                Primary Care Provider Office Phone # Fax #    Joel Daniel Irwin Wegener, [phone]       [place] [address]        Equal Access to Services     LI GENAO AH: Hadii mary garcia hadasho Soomaali, waaxda luqadaha, qaybta kaalmada adeegyada, donis fallon. So [facility] [phone].    ATENCIÓN: Si habla español, tiene a camp disposición servicios gratuitos de asistencia lingüística. Llame al [phone].    We comply with applicable federal civil rights laws and [place] laws. We do not discriminate on the basis of race, color, national origin, age, disability sex, sexual orientation or gender identity.            Thank you!     Thank you for choosing [facility] WOUND OSTOMY  for your care. Our goal is always to provide you with excellent care.  Hearing back from our patients is one way we can continue to improve our services. Please take a few minutes to complete the written survey that you may receive in the mail after your visit with us. Thank you!             Your Updated Medication List - Protect others around you: Learn how to safely use, store and throw away your medicines at www.disposemymeds.org.          This list is accurate as of: 7/31/17  9:04 AM.  Always use your most recent med list.                   Brand Name Dispense Instructions for use Diagnosis    * ACE/ARB NOT PRESCRIBED (INTENTIONAL)      ACE & ARB not prescribed due to Allergy        ACURA BLOOD GLUCOSE METER W/DEVICE Kit     1 kit    1 Dose 2 times daily    Type 2 diabetes, HbA1c goal < 7% (H)       albuterol 108 (90 BASE) MCG/ACT Inhaler    PROAIR HFA/PROVENTIL HFA/VENTOLIN HFA    3 Inhaler    Inhale 2 puffs into the lungs every 6 hours as needed for shortness of breath / dyspnea or wheezing Ventolin or other covered brand    Moderate persistent asthma without complication       Alcohol Pads 70 % Pads     100 each    1 Box 4 times daily    Type 2 diabetes, HbA1c goal < 7% (H)       baclofen 10 MG tablet    LIORESAL    180 tablet    Take 2 tablets (20 mg) by mouth 3 times daily    Muscle spasms of both lower extremities, Back muscle spasm       blood glucose monitoring lancets     1 Box    Use to test blood sugars 2 times daily or as directed.    Type 2 diabetes, HbA1c goal < 7% (H)       blood glucose monitoring test strip    no brand specified    1 Box    Use to test blood sugars one time daily or as directed  Accucheck Richards plus test strips    Type 2 diabetes, HbA1c goal < 7% (H)       * clindamycin 150 MG capsule    CLEOCIN    10 capsule    OPEN 1 CAP AND PLACE IN 1 LITER OF NORMAL SALINE & MIX. IRRIGATE WITH 20ML EACH NOSTRIL 4 TIMES/DAY    Chronic sinusitis, unspecified location       * clindamycin 300 MG capsule    CLEOCIN    56 capsule    Take 1 capsule (300 mg) by mouth 4  times daily    Tooth abscess       COMPRESSION STOCKINGS     2 each    Knee high compression socks 30-40-mm    Lymphedema of both lower extremities       hydrochlorothiazide 25 MG tablet    HYDRODIURIL    90 tablet    Take 1 tablet (25 mg) by mouth daily    Hypertension goal BP (blood pressure) < 140/90       insulin glargine 100 UNIT/ML injection    LANTUS SOLOSTAR    15 mL    Inject 28 units under the skin daily.    Type 2 diabetes mellitus without complication, with long-term current use of insulin (H)       insulin pen needle 32G X 4 MM    BD ORSE U/F    90 each    Use one daily as directed.    Type 2 diabetes mellitus without complication (H)       mupirocin 2 % ointment    BACTROBAN    2 g    Apply to anterior nares BID X 2 month supply    Nasal lesion, Nasal vestibulitis       omeprazole 40 MG capsule    priLOSEC    90 capsule    Take 1 capsule (40 mg) by mouth daily Take 30-60 minutes before a meal.    Gastroesophageal reflux disease without esophagitis       * order for DME     1 Device    Equipment being ordered: BP cuff    Hypertension goal BP (blood pressure) < 140/90       order for DME     1 Device    Equipment being ordered: one donut for sitting    Pressure ulcer, stage II       potassium chloride SA 20 MEQ CR tablet    potassium chloride    180 tablet    Take 3 tablets (60 mEq) by mouth daily    Hypokalemia       promethazine 25 MG tablet    PHENERGAN    40 tablet    Take 1 tablet (25 mg) by mouth every 6 hours as needed for nausea    Nausea without vomiting       propranolol 20 MG tablet    INDERAL    270 tablet    Take 1 tablet (20 mg) by mouth 3 times daily    Esophageal varices in cirrhosis (H)       sertraline 100 MG tablet    ZOLOFT    180 tablet    Take 2 tablets (200 mg) by mouth daily    Generalized anxiety disorder       sodium chloride 0.9 % Soln     85796 mL    Irrigate 20 cc each nostril QID X 2 month supply    Chronic maxillary sinusitis       Syringe (Disposable) 25 ML Misc     1 each     Syringe to be used for nasal irrigation    Chronic maxillary sinusitis       Urea 20 % Crea cream     480 g    Externally apply topically daily    Tyloma       * Notice:  This list has 4 medication(s) that are the same as other medications prescribed for you. Read the directions carefully, and ask your doctor or other care provider to review them with you.

## 2017-07-31 NOTE — PROGRESS NOTES
Patient comes to wound clinic for wound assessment per request of Dr. Licona. He has history of a ulcer on the right fleshy buttock due to Neuropathic Ulcer: Pressure ulcer, stage II:Located on right buttock near anus, still present. Not painful because patient lacks sensation below the waist 2/2 complication of back surgery in the past. He has noted bleeding and oozing from the ulcer, however it is now minimally draining.    Clean gloves are donned and current dressing removed. Previous treatment includes: Jazmin  This is treatment week:  Objective findings: no change dry wounds       Location: right buttock near anus non-tracking looks like a  fissure    Previous Scabbed area noted near Coccyx recommended moisturizer now a new wound: 2 cm x 1.6 cm x 0.2 cm with area of epithelialization through the middle of it. Unlikely to be a  pressure ulcer but likely due to friction from sliding down in chair while sitting.    Wound: Full thickness     Wound description: no sign of infection    Tenderness: no    Drainage is light, serosanguinous     Wound Base: Dry pale wound bed     Periwound Skin: intact, fibrous scarring with some maceration of tissue    Color of lety-wound skin: pink          Pt also stated that he has a very superficial open area of right leg that he has had for a couple of months that he thinks started out as a bug bit of some sort. This is a very superficial partial thickness open area that is 1.5 x 1 cm with pink wound bed, no drainage noted with flesh tone lety-wound skin. No s/s of infection. Tx: scrubbed with scrubcare, rinsed with NS, covered with hydrocolloid dressing to stay on area for 3 days and patient with change this at home.  Subjective finding:     Patient is assessed for discomfort which is: minimal     Today's status of the wound: stable, dry but stalled wounds     Treatment: There was no dressing covering the wound, Visual inspection, Wound cleansed with scrubcare and rinsed with  NS, Keep in place until 3 days and replace. Treated and follow-up appointment made for 1 week. Continue with current treatment but be carefull to place the dresing near the medial aspect of the wound.      Pt received the following instructions: Treatment as above. Encouraged no sliding down while sitting in chair. Dr. Nicholson was available for supervision of care if needed or if questions should arise and regarding plan of care. Roland Chapin RN, CWON

## 2017-08-17 ENCOUNTER — OFFICE VISIT (OUTPATIENT)
Dept: WOUND CARE | Facility: CLINIC | Age: 57
End: 2017-08-17

## 2017-08-17 DIAGNOSIS — S31.819A WOUND, OPEN, BUTTOCK, RIGHT, INITIAL ENCOUNTER: Primary | ICD-10-CM

## 2017-08-17 NOTE — MR AVS SNAPSHOT
After Visit Summary   8/17/2017    Erwin Aguilar    MRN: [id number]           Patient Information     Date Of Birth          1960        Visit Information        Provider Department      8/17/2017 8:30 AM Breanna Sahni RN [facility] Wound Ostomy        Today's Diagnoses     Wound, open, buttock, right    -  1       Follow-ups after your visit        Your next 10 appointments already scheduled     Aug 31, 2017  9:30 AM CDT   RETURN WOUND NURSE VISIT with Breanna Sahni RN   [facility] Wound Ostomy ([facility])    [address]  4th Floor  [facility] [id number] [phone]            Oct 03, 2017  8:15 AM CDT   Lab with  LAB   [facility] Lab ([facility])    [address]  1st Floor  [facility] [address] [phone]            Oct 03, 2017  9:20 AM CDT   (Arrive by 9:05 AM)   Return General Liver with Kimberly Ramirez MD   [facility] Hepatology ([facility])    [address] [place] [address]  3rd Floor  [facility] [address] [phone]              Who to contact     Please call your clinic at [phone] to:    Ask questions about your health    Make or cancel appointments    Discuss your medicines    Learn about your test results    Speak to your doctor   If you have compliments or concerns about an experience at your clinic, or if you wish to file a complaint, please contact [facility] Patient Relations at [phone] or email us at [email or web address]         Additional Information About Your Visit        MyChart Information     [employer] gives you secure access to your electronic health record. If you see a primary care provider, you can also send messages to your care team and make appointments. If you have questions, please call your primary care clinic.  If you do not have a primary care provider, please call [phone] and they will  assist you.      [employer] is an electronic gateway that provides easy, online access to your medical records. With [employer], you can request a clinic appointment, read your test results, renew a prescription or communicate with your care team.     To access your existing account, please contact your [facility] or call [phone] for assistance.        Care EveryWhere ID     This is your Care EveryWhere ID. This could be used by other organizations to access your [place] medical records  [id number]         Blood Pressure from Last 3 Encounters:   05/09/17 136/78   04/11/17 167/83   03/23/17 144/83    Weight from Last 3 Encounters:   05/09/17 108 kg (238 lb)   04/11/17 100.7 kg (222 lb)   03/23/17 102.3 kg (225 lb 9.6 oz)              Today, you had the following     No orders found for display         Today's Medication Changes          These changes are accurate as of: 8/17/17 11:59 PM.  If you have any questions, ask your nurse or doctor.               These medicines have changed or have updated prescriptions.        Dose/Directions    * order for DME   This may have changed:  Another medication with the same name was added. Make sure you understand how and when to take each.   Used for:  Pressure ulcer, stage II        Equipment being ordered: one donut for sitting   Quantity:  1 Device   Refills:  0       * order for DME   This may have changed:  You were already taking a medication with the same name, and this prescription was added. Make sure you understand how and when to take each.   Used for:  Wound, open, buttock, right, initial encounter        ?Location:sacral ulcer unknown etiology possible sheer ?measurement: 2.5 cm x 2 cm x 0.1 cm Unlikely to be a  pressure ulcer but if it is it's a stg 2,  partial thickness, low eexudate  Wound supplies : 5 each Comfeel? Plus [id number] x 2 3/4''  5x7 cm  [facility]   wound care orders: cleans wound, dry thoroughly  and replace  Comfeel. Leave in place for 7 days for 5 weeks   Quantity:  5 each   Refills:  3       potassium chloride SA 20 MEQ CR tablet   Commonly known as:  potassium chloride   This may have changed:  how much to take   Used for:  Hypokalemia        Dose:  60 mEq   Take 3 tablets (60 mEq) by mouth daily   Quantity:  180 tablet   Refills:  3       * Notice:  This list has 2 medication(s) that are the same as other medications prescribed for you. Read the directions carefully, and ask your doctor or other care provider to review them with you.         Where to get your medicines      Some of these will need a paper prescription and others can be bought over the counter.  Ask your nurse if you have questions.     Bring a paper prescription for each of these medications     order for DME                Primary Care Provider Office Phone # Fax #    Joel Daniel Irwin Wegener, [phone] [address]  [facility] [address]        Equal Access to Services     [facility]: Hadii mary garcia hadasho Soomaali, waaxda luqadaha, qaybta kaalmada adeegyada, donis nicholas hayisidro allison . So [facility] [phone].    ATENCIÓN: Si habla español, tiene a camp disposición servicios gratuitos de asistencia lingüística. Llame al [phone].    We comply with applicable federal civil rights laws and [place] laws. We do not discriminate on the basis of race, color, national origin, age, disability sex, sexual orientation or gender identity.            Thank you!     Thank you for choosing [facility] WOUND OSTOMY  for your care. Our goal is always to provide you with excellent care. Hearing back from our patients is one way we can continue to improve our services. Please take a few minutes to complete the written survey that you may receive in the mail after your visit with us. Thank you!             Your Updated Medication List - Protect others around you: Learn how to safely use, store and throw away your medicines  at www.disposemymeds.org.          This list is accurate as of: 8/17/17 11:59 PM.  Always use your most recent med list.                   Brand Name Dispense Instructions for use Diagnosis    * ACE/ARB NOT PRESCRIBED (INTENTIONAL)      ACE & ARB not prescribed due to Allergy        ACURA BLOOD GLUCOSE METER W/DEVICE Kit     1 kit    1 Dose 2 times daily    Type 2 diabetes, HbA1c goal < 7% (H)       albuterol 108 (90 BASE) MCG/ACT Inhaler    PROAIR HFA/PROVENTIL HFA/VENTOLIN HFA    3 Inhaler    Inhale 2 puffs into the lungs every 6 hours as needed for shortness of breath / dyspnea or wheezing Ventolin or other covered brand    Moderate persistent asthma without complication       Alcohol Pads 70 % Pads     100 each    1 Box 4 times daily    Type 2 diabetes, HbA1c goal < 7% (H)       baclofen 10 MG tablet    LIORESAL    180 tablet    Take 2 tablets (20 mg) by mouth 3 times daily    Muscle spasms of both lower extremities, Back muscle spasm       blood glucose monitoring lancets     1 Box    Use to test blood sugars 2 times daily or as directed.    Type 2 diabetes, HbA1c goal < 7% (H)       blood glucose monitoring test strip    no brand specified    1 Box    Use to test blood sugars one time daily or as directed  Accucheck Richards plus test strips    Type 2 diabetes, HbA1c goal < 7% (H)       * clindamycin 150 MG capsule    CLEOCIN    10 capsule    OPEN 1 CAP AND PLACE IN 1 LITER OF NORMAL SALINE & MIX. IRRIGATE WITH 20ML EACH NOSTRIL 4 TIMES/DAY    Chronic sinusitis, unspecified location       * clindamycin 300 MG capsule    CLEOCIN    56 capsule    Take 1 capsule (300 mg) by mouth 4 times daily    Tooth abscess       COMPRESSION STOCKINGS     2 each    Knee high compression socks 30-40-mm    Lymphedema of both lower extremities       hydrochlorothiazide 25 MG tablet    HYDRODIURIL    90 tablet    Take 1 tablet (25 mg) by mouth daily    Hypertension goal BP (blood pressure) < 140/90       insulin glargine 100 UNIT/ML  injection    LANTUS SOLOSTAR    15 mL    Inject 28 units under the skin daily.    Type 2 diabetes mellitus without complication, with long-term current use of insulin (H)       insulin pen needle 32G X 4 MM    BD ROSE U/F    90 each    Use one daily as directed.    Type 2 diabetes mellitus without complication (H)       mupirocin 2 % ointment    BACTROBAN    2 g    Apply to anterior nares BID X 2 month supply    Nasal lesion, Nasal vestibulitis       omeprazole 40 MG capsule    priLOSEC    90 capsule    Take 1 capsule (40 mg) by mouth daily Take 30-60 minutes before a meal.    Gastroesophageal reflux disease without esophagitis       * order for DME     1 Device    Equipment being ordered: BP cuff    Hypertension goal BP (blood pressure) < 140/90       * order for DME     1 Device    Equipment being ordered: one donut for sitting    Pressure ulcer, stage II       * order for DME     5 each    ?Location:sacral ulcer unknown etiology possible sheer ?measurement: 2.5 cm x 2 cm x 0.1 cm Unlikely to be a  pressure ulcer but if it is it's a stg 2,  partial thickness, low eexudate  Wound supplies : 5 each Comfeel? Plus [id number] x 2 3/4''  5x7 cm  formerly [facility]   wound care orders: cleans wound, dry thoroughly  and replace Comfeel. Leave in place for 7 days for 5 weeks    Wound, open, buttock, right, initial encounter       potassium chloride SA 20 MEQ CR tablet    potassium chloride    180 tablet    Take 3 tablets (60 mEq) by mouth daily    Hypokalemia       promethazine 25 MG tablet    PHENERGAN    40 tablet    Take 1 tablet (25 mg) by mouth every 6 hours as needed for nausea    Nausea without vomiting       propranolol 20 MG tablet    INDERAL    270 tablet    Take 1 tablet (20 mg) by mouth 3 times daily    Esophageal varices in cirrhosis (H)       sertraline 100 MG tablet    ZOLOFT    180 tablet    Take 2 tablets (200 mg) by mouth daily    Generalized anxiety disorder       sodium chloride 0.9 % Soln     83707 mL     Irrigate 20 cc each nostril QID X 2 month supply    Chronic maxillary sinusitis       Syringe (Disposable) 25 ML Misc     1 each    Syringe to be used for nasal irrigation    Chronic maxillary sinusitis       Urea 20 % Crea cream     480 g    Externally apply topically daily    Tyloma       * Notice:  This list has 6 medication(s) that are the same as other medications prescribed for you. Read the directions carefully, and ask your doctor or other care provider to review them with you.

## 2017-08-17 NOTE — PROGRESS NOTES
Patient comes to wound clinic for wound assessment per request of dr. Licona. He has history of a ulcer on the right fleshy buttock due to Neuropathic Ulcer: Pressure ulcer, stage II:Located on right buttock near anus,has resolved,Sacral ulcer not reducing in size, .    Clean gloves are donned and current dressing removed. Previous treatment includes: Jazmin  This is treatment week:10  Objective findings: wound near anus has resolved but sacral wound still present and dry       Location:sacral ulcer unknown etiology possible sheer    measurement: 2.5 cm x 2 cm x 0.1 cm Unlikely to be a  pressure ulcer stg 2 but pt is not bed bound so etiology is unknown    Wound: Full thickness,     Wound description: no sign of infection    Tenderness: yes which is new    Drainage is light, serosanguinous     Wound Base: Dry pale wound bed     Periwound Skin: intact, fibrous scarring     Color: normal and consistent with surrounding tissue        Subjective finding:     Pt states: wound has become sore    Patient is assessed for discomfort which is: moderate     Today's status of the wound: stable, dry but stalled wound. Wound near anus resolved     Treatment: Wound cleansed with Microklenz spray Cover with hydrocolloid and leave in place as long as possible up to 1 week or when pain increases    Pt also stated that he has a very superficial open area of right leg that he has had for a couple of months that he thinks started out as a bug bit of some sort. This is a very superficial partial thickness open area that is 1.5 x 1 cm which is mostuly dry with induration no drainage noted with flesh tone lety-wound skin. No s/s of infection. Tx: covered with hydrocolloid dressing to stay on area for 3 days and patient with change this at home.    Pt received the following instructions:  Encouraged decreased sitting. Pt should lay on his sides or be up Dr. Nicholson was available for supervision of care if needed or if questions should  arise and regarding plan of care. Breanna Sahni RN, CWON

## 2017-08-25 DIAGNOSIS — J32.9 CHRONIC SINUSITIS, UNSPECIFIED LOCATION: Primary | ICD-10-CM

## 2017-08-25 RX ORDER — CLINDAMYCIN HCL 150 MG
CAPSULE ORAL
Qty: 10 CAPSULE | Refills: 3 | Status: SHIPPED | OUTPATIENT
Start: 2017-08-25 | End: 2018-06-18

## 2017-09-01 ENCOUNTER — OFFICE VISIT (OUTPATIENT)
Dept: WOUND CARE | Facility: CLINIC | Age: 57
End: 2017-09-01

## 2017-09-01 DIAGNOSIS — S31.819A WOUND, OPEN, BUTTOCK, RIGHT, INITIAL ENCOUNTER: Primary | ICD-10-CM

## 2017-09-01 NOTE — PROGRESS NOTES
Patient comes to wound clinic for wound assessment per request of dr. Licona. He has history of a ulcer on the right fleshy buttock due to Neuropathic Ulcer: Pressure ulcer, stage II:Located on right buttock near anus,has resolved,Sacral ulcer not reducing in size, .    Clean gloves are donned and current dressing removed. Previous treatment includes: colloid  This is treatment week:10  Objective findings: wound near anus has resolved but sacral wound is deteriorating       Location:sacral ulcer unknown etiology possible sheer    measurement: 3 cm x 3 cm x 0.1 cm Unlikely to be a  pressure ulcer stg 2 but pt is not bed bound so etiology is unknown    Wound: Full thickness,     Wound description: no sign of infection    Tenderness: yes which is new    Drainage is light, serosanguinous     Wound Base: Dry pale wound bed, hypertrophic     Periwound Skin: intact, fibrous scarring   Color: normal and consistent with surrounding tissue          Subjective finding:     Pt states: wound has become sore    Patient is assessed for discomfort which is: moderate     Today's status of the wound: stable, dry but stalled wound. Wound near anus resolved     Treatment: Wound cleansed with Microklenz spray, Endoform ( might have contributed with healing of other ulcer in May)  Cover with hydrocolloid and leave in place for 4 day then replace. RTC next week. Called Derm resident because of the odd appearance of the wound. Should be seen before December. Resident will contact  to get pt on.     Pt received the following instructions:  Encouraged decreased sitting. Pt should lay on his sides or be up Dr. Hansen was available for supervision of care if needed or if questions should arise and regarding plan of care. Breanna Sahni RN, CWON

## 2017-09-01 NOTE — MR AVS SNAPSHOT
After Visit Summary   9/1/2017    Erwin Aguilar    MRN: [id number]           Patient Information     Date Of Birth          1960        Visit Information        Provider Department      9/1/2017 10:30 AM Breanna Sahni RN [facility]        Today's Diagnoses     Wound, open, buttock, right    -  1       Follow-ups after your visit        Additional Services     DERMATOLOGY REFERRAL       Your provider has referred you to: [facility]: Dermatology Clinic [facility] [phone]   [email or web address]    Please be aware that coverage of these services is subject to the terms and limitations of your health insurance plan.  Call member services at your health plan with any benefit or coverage questions.      Please bring the following with you to your appointment:    (1) Any X-Rays, CTs or MRIs which have been performed.  Contact the facility where they were done to arrange for  prior to your scheduled appointment.    (2) List of current medications  (3) This referral request   (4) Any documents/labs given to you for this referral                  Your next 10 appointments already scheduled     Sep 14, 2017  8:30 AM CDT   RETURN WOUND NURSE VISIT with R[facility] ([facility])    [address]  4th Floor  [place] [facility] [address] [phone]            Oct 03, 2017  8:15 AM CDT   Lab with  LAB    [facility] ([facility])    [address]  1st Floor  [facility] [id number] [phone]            Oct 03, 2017  9:20 AM CDT   (Arrive by 9:05 AM)   Return General Liver with Kimberly Ramirez MD   [facility] ([facility])    [address]  3rd Floor  [facility] [place] [id number] [phone]              Who to contact     Please call your clinic at [phone] to:    Ask questions about your  health    Make or cancel appointments    Discuss your medicines    Learn about your test results    Speak to your doctor   If you have compliments or concerns about an experience at your clinic, or if you wish to file a complaint, please contact [facility] Patient Relations at [phone] or email us at [email or web address]         Additional Information About Your Visit        MyChart Information     [employer] gives you secure access to your electronic health record. If you see a primary care provider, you can also send messages to your care team and make appointments. If you have questions, please call your primary care clinic.  If you do not have a primary care provider, please call [phone] and they will assist you.      [employer] is an electronic gateway that provides easy, online access to your medical records. With [employer], you can request a clinic appointment, read your test results, renew a prescription or communicate with your care team.     To access your existing account, please contact your [facility] or call [phone] for assistance.        Care EveryWhere ID     This is your Care EveryWhere ID. This could be used by other organizations to access your [facility] medical records  [id number]         Blood Pressure from Last 3 Encounters:   05/09/17 136/78   04/11/17 167/83   03/23/17 144/83    Weight from Last 3 Encounters:   05/09/17 108 kg (238 lb)   04/11/17 100.7 kg (222 lb)   03/23/17 102.3 kg (225 lb 9.6 oz)              We Performed the Following     DERMATOLOGY REFERRAL          Today's Medication Changes          These changes are accurate as of: 9/1/17  1:59 PM.  If you have any questions, ask your nurse or doctor.               These medicines have changed or have updated prescriptions.        Dose/Directions    potassium chloride SA 20 MEQ CR tablet   Commonly known as:  potassium chloride   This may have changed:  how  much to take   Used for:  Hypokalemia        Dose:  60 mEq   Take 3 tablets (60 mEq) by mouth daily   Quantity:  180 tablet   Refills:  3                Primary Care Provider Office Phone # Fax #    Joel Daniel Irwin Wegener, [phone] [address]  [facility] [address]        Equal Access to Services     RENATO GENAO : Hadii aad ku hadasho Soomaali, waaxda luqadaha, qaybta kaalmada adeegyada, waxay idiin hayaan adeeg kharash la'aan . So [facility] [phone].    ATENCIÓN: Si habla español, tiene a camp disposición servicios gratuitos de asistencia lingüística. Llame al [phone].    We comply with applicable federal civil rights laws and [place] laws. We do not discriminate on the basis of race, color, national origin, age, disability sex, sexual orientation or gender identity.            Thank you!     Thank you for choosing [facility] WOUND OSTOMY  for your care. Our goal is always to provide you with excellent care. Hearing back from our patients is one way we can continue to improve our services. Please take a few minutes to complete the written survey that you may receive in the mail after your visit with us. Thank you!             Your Updated Medication List - Protect others around you: Learn how to safely use, store and throw away your medicines at www.disposemymeds.org.          This list is accurate as of: 9/1/17  1:59 PM.  Always use your most recent med list.                   Brand Name Dispense Instructions for use Diagnosis    * ACE/ARB NOT PRESCRIBED (INTENTIONAL)      ACE & ARB not prescribed due to Allergy        ACURA BLOOD GLUCOSE METER W/DEVICE Kit     1 kit    1 Dose 2 times daily    Type 2 diabetes, HbA1c goal < 7% (H)       albuterol 108 (90 BASE) MCG/ACT Inhaler    PROAIR HFA/PROVENTIL HFA/VENTOLIN HFA    3 Inhaler    Inhale 2 puffs into the lungs every 6 hours as needed for shortness of breath / dyspnea or wheezing Ventolin or other covered brand    Moderate  persistent asthma without complication       Alcohol Pads 70 % Pads     100 each    1 Box 4 times daily    Type 2 diabetes, HbA1c goal < 7% (H)       baclofen 10 MG tablet    LIORESAL    180 tablet    Take 2 tablets (20 mg) by mouth 3 times daily    Muscle spasms of both lower extremities, Back muscle spasm       blood glucose monitoring lancets     1 Box    Use to test blood sugars 2 times daily or as directed.    Type 2 diabetes, HbA1c goal < 7% (H)       blood glucose monitoring test strip    no brand specified    1 Box    Use to test blood sugars one time daily or as directed  Accucheck Richards plus test strips    Type 2 diabetes, HbA1c goal < 7% (H)       * clindamycin 150 MG capsule    CLEOCIN    10 capsule    OPEN 1 CAP AND PLACE IN 1 LITER OF NORMAL SALINE & MIX. IRRIGATE WITH 20ML EACH NOSTRIL 4 TIMES/DAY    Chronic sinusitis, unspecified location       * clindamycin 300 MG capsule    CLEOCIN    56 capsule    Take 1 capsule (300 mg) by mouth 4 times daily    Tooth abscess       * clindamycin 150 MG capsule    CLEOCIN    10 capsule    OPEN 1 CAP AND PLACE IN 1 LITER OF NORMAL SALINE & MIX. IRRIGATE WITH 20 ML EACH NOSTRIL 4 TIMES PER DAY    Chronic sinusitis, unspecified location       COMPRESSION STOCKINGS     2 each    Knee high compression socks 30-40-mm    Lymphedema of both lower extremities       hydrochlorothiazide 25 MG tablet    HYDRODIURIL    90 tablet    Take 1 tablet (25 mg) by mouth daily    Hypertension goal BP (blood pressure) < 140/90       insulin glargine 100 UNIT/ML injection    LANTUS SOLOSTAR    15 mL    Inject 28 units under the skin daily.    Type 2 diabetes mellitus without complication, with long-term current use of insulin (H)       insulin pen needle 32G X 4 MM    BD ROSE U/F    90 each    Use one daily as directed.    Type 2 diabetes mellitus without complication (H)       mupirocin 2 % ointment    BACTROBAN    2 g    Apply to anterior nares BID X 2 month supply    Nasal lesion,  Nasal vestibulitis       omeprazole 40 MG capsule    priLOSEC    90 capsule    Take 1 capsule (40 mg) by mouth daily Take 30-60 minutes before a meal.    Gastroesophageal reflux disease without esophagitis       * order for DME     1 Device    Equipment being ordered: BP cuff    Hypertension goal BP (blood pressure) < 140/90       * order for DME     1 Device    Equipment being ordered: one donut for sitting    Pressure ulcer, stage II       * order for DME     5 each    ?Location:sacral ulcer unknown etiology possible sheer ?measurement: 2.5 cm x 2 cm x 0.1 cm Unlikely to be a  pressure ulcer but if it is it's a stg 2,  partial thickness, low eexudate  Wound supplies : 5 each Comfeel? Plus [id number] x 2 3/4''  5x7 cm  [facility]   wound care orders: cleans wound, dry thoroughly  and replace Comfeel. Leave in place for 7 days for 5 weeks    Wound, open, buttock, right, initial encounter       potassium chloride SA 20 MEQ CR tablet    potassium chloride    180 tablet    Take 3 tablets (60 mEq) by mouth daily    Hypokalemia       promethazine 25 MG tablet    PHENERGAN    40 tablet    Take 1 tablet (25 mg) by mouth every 6 hours as needed for nausea    Nausea without vomiting       propranolol 20 MG tablet    INDERAL    270 tablet    Take 1 tablet (20 mg) by mouth 3 times daily    Esophageal varices in cirrhosis (H)       sertraline 100 MG tablet    ZOLOFT    180 tablet    Take 2 tablets (200 mg) by mouth daily    Generalized anxiety disorder       sodium chloride 0.9 % Soln     77081 mL    Irrigate 20 cc each nostril QID X 2 month supply    Chronic maxillary sinusitis       Syringe (Disposable) 25 ML Misc     1 each    Syringe to be used for nasal irrigation    Chronic maxillary sinusitis       Urea 20 % Crea cream     480 g    Externally apply topically daily    Tyloma       * Notice:  This list has 7 medication(s) that are the same as other medications prescribed for you. Read the directions carefully, and  ask your doctor or other care provider to review them with you.

## 2017-09-07 ENCOUNTER — TELEPHONE (OUTPATIENT)
Dept: FAMILY MEDICINE | Facility: CLINIC | Age: 57
End: 2017-09-07

## 2017-09-07 DIAGNOSIS — S31.819A WOUND, OPEN, BUTTOCK, RIGHT, INITIAL ENCOUNTER: Primary | ICD-10-CM

## 2017-09-07 NOTE — TELEPHONE ENCOUNTER
Reason for Call: Request for an order or referral:    Order or referral being requested: Dermatology    Date needed: as soon as possible    Has the patient been seen by the PCP for this problem? YES    Additional comments: Wound care provider recommended dermatology for wound on buttock.    Phone number Patient can be reached at:  Home number on file [phone] (home)    Best Time:      Can we leave a detailed message on this number?  YES    Call taken on 9/7/2017 at 2:44 PM by Miriam Vang

## 2017-09-07 NOTE — TELEPHONE ENCOUNTER
I placed referral and sent via [employer].  I called Erwin and told him I sent it to him via [employer].    Isela Sánchez RN

## 2017-09-15 DIAGNOSIS — K70.30 ALCOHOLIC CIRRHOSIS OF LIVER WITHOUT ASCITES (H): Primary | ICD-10-CM

## 2017-09-15 DIAGNOSIS — M62.830 BACK MUSCLE SPASM: ICD-10-CM

## 2017-09-15 DIAGNOSIS — M62.838 MUSCLE SPASMS OF BOTH LOWER EXTREMITIES: ICD-10-CM

## 2017-09-15 NOTE — TELEPHONE ENCOUNTER
Medication Detail      Disp Refills Start End PAT   baclofen (LIORESAL) 10 MG tablet 180 tablet 12 6/7/2016  No   Sig: Take 2 tablets (20 mg) by mouth 3 times daily   Class: E-Prescribe   Notes to Pharmacy: Please disregard earlier prescriptions, I forgot to add refills.   Route: Oral   Order: [id number]       [place] with [email or web address] or [facility] prescribing provider: 5/9/2017  Future Office visit:       Routing refill request to provider for review/approval because:  Drug not on the [facility], [email or web address]] or [facility] refill protocol or controlled substance

## 2017-09-19 RX ORDER — BACLOFEN 10 MG/1
TABLET ORAL
Qty: 180 TABLET | Refills: 8 | Status: SHIPPED | OUTPATIENT
Start: 2017-09-19 | End: 2018-05-16

## 2017-09-22 ENCOUNTER — MYC MEDICAL ADVICE (OUTPATIENT)
Dept: FAMILY MEDICINE | Facility: CLINIC | Age: 57
End: 2017-09-22

## 2017-09-22 DIAGNOSIS — M54.17 LUMBOSACRAL RADICULOPATHY: Primary | ICD-10-CM

## 2017-10-03 ENCOUNTER — OFFICE VISIT (OUTPATIENT)
Dept: GASTROENTEROLOGY | Facility: CLINIC | Age: 57
End: 2017-10-03
Attending: INTERNAL MEDICINE
Payer: MEDICARE

## 2017-10-03 VITALS
SYSTOLIC BLOOD PRESSURE: 191 MMHG | RESPIRATION RATE: 18 BRPM | WEIGHT: 223 LBS | HEART RATE: 61 BPM | BODY MASS INDEX: 31.92 KG/M2 | TEMPERATURE: 98.4 F | OXYGEN SATURATION: 98 % | HEIGHT: 70 IN | DIASTOLIC BLOOD PRESSURE: 100 MMHG

## 2017-10-03 DIAGNOSIS — K74.60 ESOPHAGEAL VARICES IN CIRRHOSIS (H): ICD-10-CM

## 2017-10-03 DIAGNOSIS — K70.30 ALCOHOLIC CIRRHOSIS OF LIVER WITHOUT ASCITES (H): ICD-10-CM

## 2017-10-03 DIAGNOSIS — I85.10 ESOPHAGEAL VARICES IN CIRRHOSIS (H): ICD-10-CM

## 2017-10-03 DIAGNOSIS — K70.30 ALCOHOLIC CIRRHOSIS OF LIVER WITHOUT ASCITES (H): Primary | ICD-10-CM

## 2017-10-03 LAB
ALBUMIN SERPL-MCNC: 4 G/DL (ref 3.4–5)
ALP SERPL-CCNC: 136 U/L (ref 40–150)
ALT SERPL W P-5'-P-CCNC: 30 U/L (ref 0–70)
ANION GAP SERPL CALCULATED.3IONS-SCNC: 6 MMOL/L (ref 3–14)
AST SERPL W P-5'-P-CCNC: 19 U/L (ref 0–45)
BILIRUB DIRECT SERPL-MCNC: 0.1 MG/DL (ref 0–0.2)
BILIRUB SERPL-MCNC: 0.7 MG/DL (ref 0.2–1.3)
BUN SERPL-MCNC: 6 MG/DL (ref 7–30)
CALCIUM SERPL-MCNC: 8.2 MG/DL (ref 8.5–10.1)
CHLORIDE SERPL-SCNC: 106 MMOL/L (ref 94–109)
CO2 SERPL-SCNC: 29 MMOL/L (ref 20–32)
CREAT SERPL-MCNC: 0.64 MG/DL (ref 0.66–1.25)
ERYTHROCYTE [DISTWIDTH] IN BLOOD BY AUTOMATED COUNT: 14.6 % (ref 10–15)
GFR SERPL CREATININE-BSD FRML MDRD: >90 ML/MIN/1.7M2
GLUCOSE SERPL-MCNC: 138 MG/DL (ref 70–99)
HCT VFR BLD AUTO: 40.2 % (ref 40–53)
HGB BLD-MCNC: 13.4 G/DL (ref 13.3–17.7)
INR PPP: 1.09 (ref 0.86–1.14)
MCH RBC QN AUTO: 27.8 PG (ref 26.5–33)
MCHC RBC AUTO-ENTMCNC: 33.3 G/DL (ref 31.5–36.5)
MCV RBC AUTO: 83 FL (ref 78–100)
PLATELET # BLD AUTO: 194 10E9/L (ref 150–450)
POTASSIUM SERPL-SCNC: 3.8 MMOL/L (ref 3.4–5.3)
PROT SERPL-MCNC: 8.4 G/DL (ref 6.8–8.8)
RBC # BLD AUTO: 4.82 10E12/L (ref 4.4–5.9)
SODIUM SERPL-SCNC: 141 MMOL/L (ref 133–144)
WBC # BLD AUTO: 8.1 10E9/L (ref 4–11)

## 2017-10-03 PROCEDURE — 85610 PROTHROMBIN TIME: CPT | Performed by: INTERNAL MEDICINE

## 2017-10-03 PROCEDURE — 80076 HEPATIC FUNCTION PANEL: CPT | Performed by: INTERNAL MEDICINE

## 2017-10-03 PROCEDURE — 80048 BASIC METABOLIC PNL TOTAL CA: CPT | Performed by: INTERNAL MEDICINE

## 2017-10-03 PROCEDURE — 99212 OFFICE O/P EST SF 10 MIN: CPT | Mod: ZF

## 2017-10-03 PROCEDURE — 36415 COLL VENOUS BLD VENIPUNCTURE: CPT | Performed by: INTERNAL MEDICINE

## 2017-10-03 PROCEDURE — 85027 COMPLETE CBC AUTOMATED: CPT | Performed by: INTERNAL MEDICINE

## 2017-10-03 ASSESSMENT — PAIN SCALES - GENERAL: PAINLEVEL: NO PAIN (0)

## 2017-10-03 NOTE — LETTER
10/3/2017      RE: Erwin Aguilar  [address]  [place] [address]       [facility]    Hepatology follow-up    Follow-up visit for  alcoholic cirrhosis.    Subjective:  Mr. Aguilar is a 56-year-old  male who we have followed here for alcoholic liver disease.  He also has other medical issues which include cauda equina  syndrome and had back surgeries.  He also had neurogenic bladder and bowel problems.  He carries also a diagnosis of diabetes mellitus.  He did present initially with hemetemesis and came to the emergency room.  He did get an EGD and was found to have esophageal varices that were banded.  Since then, he did not have any further bleed.  He had his last endoscopy in 05/2016.  He did get off, on his own, lactulose as he states he cannot swallow because it bothers him and makes him nauseated.  He is not on rifaximin either.  Denies lethargy and  confusion.  Appetite is good.  No nausea or vomiting as of now.  His MELD score is 7 on his latest labs of 10/03/2017.  Patient denies jaundice, lower extremity edema and  abdominal distension. No  fevers, sweats or chills.  Weight stable.      Medical hx Surgical hx   Past Medical History:   Diagnosis Date     Actinic keratosis      Anxiety      Arrhythmia      Asthma      Bleeding disorder (H) 3-27-16     Cancer (H)      Cauda equina spinal cord injury (H)      Chronic pain      Chronic pain syndrome      Chronic sinusitis 5-1-16     Diastasis recti      Esophageal reflux      Esophageal varices in cirrhosis (H) 4/1/2014     Essential hypertension, benign      Generalized anxiety disorder      H/O dental abscess      Hernia      Liver disease 3-     MEDICAL HISTORY OF -      MEDICAL HISTORY OF -      MEDICAL HISTORY OF -      Mild depression (H)      Mixed hyperlipidemia      Nasal polyps 5-1-16     Other chronic pain      Seasonal allergic conjunctivitis      Type II or unspecified type diabetes  mellitus without mention of complication, not stated as uncontrolled      Ulcer (H)      Umbilical hernia without mention of obstruction or gangrene 10/2012     Unspecified site of spinal cord injury without evidence of spinal bone injury       Past Surgical History:   Procedure Laterality Date     C APPENDECTOMY  1974     COLONOSCOPY N/A 5/12/2016    Procedure: COMBINED COLONOSCOPY, SINGLE OR MULTIPLE BIOPSY/POLYPECTOMY BY BIOPSY;  Surgeon: Ana Paula Urbina MD;  Location:  GI     ENDOSCOPY UPPER, COLONOSCOPY, COMBINED  10/19/2011    Procedure:COMBINED ENDOSCOPY UPPER, COLONOSCOPY; Upper Endoscopy, Colonoscopy with Polypectomy x4; Surgeon:AMBAR RODRÍGUEZ; Location: OR     ENT SURGERY  1-2016    Ongoing since then     ESOPHAGOSCOPY, GASTROSCOPY, DUODENOSCOPY (EGD), COMBINED  3/28/2014    Procedure: COMBINED ESOPHAGOSCOPY, GASTROSCOPY, DUODENOSCOPY (EGD);  EGD, Gastric Biopsies, Esophageal Banding;  Surgeon: Reyna Tovar MD;  Location:  OR     ESOPHAGOSCOPY, GASTROSCOPY, DUODENOSCOPY (EGD), COMBINED  6/9/2014    Procedure: COMBINED ESOPHAGOSCOPY, GASTROSCOPY, DUODENOSCOPY (EGD);  Surgeon: Curtis Mendez MD;  Location:  GI     ESOPHAGOSCOPY, GASTROSCOPY, DUODENOSCOPY (EGD), COMBINED  7/24/2014    Procedure: COMBINED ESOPHAGOSCOPY, GASTROSCOPY, DUODENOSCOPY (EGD);  Surgeon: Gerard Samaniego MD;  Location:  OR     ESOPHAGOSCOPY, GASTROSCOPY, DUODENOSCOPY (EGD), COMBINED N/A 10/31/2014    Procedure: COMBINED ESOPHAGOSCOPY, GASTROSCOPY, DUODENOSCOPY (EGD);  Surgeon: Gerard Samaniego MD;  [facility] OR     ESOPHAGOSCOPY, GASTROSCOPY, DUODENOSCOPY (EGD), COMBINED N/A 5/12/2016    Procedure: COMBINED ESOPHAGOSCOPY, GASTROSCOPY, DUODENOSCOPY (EGD);  Surgeon: Ana Paula Urbina MD;  Location:  GI     HCL SQUAMOUS CELL CARCINOMA AG  10/07    left forearm     HERNIORRHAPHY UMBILICAL  11/8/2012    Procedure: HERNIORRHAPHY UMBILICAL;  Open Umbilical Hernia Repair With Mesh ;   Surgeon: Chase Nicholson MD;  Location: [facility] OR     neuro stimulator  2010     SURGICAL HISTORY OF -   1/02    abcess tooth     SURGICAL HISTORY OF -   1999    L4-5 laminectomy, cauda equina syndrome     SURGICAL HISTORY OF -   +    herniated disk repair     TONSILLECTOMY  10 1964     TRANSPOSITION ULNAR NERVE (ELBOW)      right          Medications  Prior to Admission medications    Medication Sig Start Date End Date Taking? Authorizing Provider   baclofen (LIORESAL) 10 MG tablet TAKE 2 TABLETS BY MOUTH THREE TIMES DAILY 9/19/17  Yes Wegener, Joel Daniel Irwin, MD   clindamycin (CLEOCIN) 150 MG capsule OPEN 1 CAP AND PLACE IN 1 LITER OF NORMAL SALINE & MIX. IRRIGATE WITH 20 ML EACH NOSTRIL 4 TIMES PER DAY  Patient taking differently: 4 times daily as needed OPEN 1 CAP AND PLACE IN 1 LITER OF NORMAL SALINE & MIX. IRRIGATE WITH 20 ML EACH NOSTRIL 4 TIMES PER DAY 8/25/17  Yes Antonio Chávez MD   order for DME   Location:sacral ulcer unknown etiology possible sheer    measurement: 2.5 cm x 2 cm x 0.1 cm Unlikely to be a  pressure ulcer but if it is it's a stg 2,  partial thickness, low eexudate    Wound supplies :  5 each Comfeel  Plus Transparent 3530 2 x 2 3/4''  5x7 cm  [facility]     wound care orders: cleans wound, dry thoroughly  and replace Comfeel. Leave in place for 7 days for 5 weeks 8/17/17  Yes Delilah Molina MD   insulin pen needle (BD ROSE U/F) 32G X 4 MM Use one daily as directed. 7/27/17  Yes Wegener, Joel Daniel Irwin, MD   insulin glargine (LANTUS SOLOSTAR) 100 UNIT/ML injection Inject 28 units under the skin daily.  Patient taking differently: Inject 26 units under the skin daily. 6/20/17  Yes Wegener, Joel Daniel Irwin, MD   propranolol (INDERAL) 20 MG tablet Take 1 tablet (20 mg) by mouth 3 times daily 6/8/17  Yes Wegener, Joel Daniel Irwin, MD   sertraline (ZOLOFT) 100 MG tablet Take 2 tablets (200 mg) by mouth daily 3/28/17  Yes Wegener, Joel Daniel Irwin, MD   mupirocin  (BACTROBAN) 2 % ointment Apply to anterior nares BID X 2 month supply 2/16/17  Yes Antonio Chávez MD   promethazine (PHENERGAN) 25 MG tablet Take 1 tablet (25 mg) by mouth every 6 hours as needed for nausea 1/24/17  Yes Wegener, Joel Daniel Irwin, MD   sodium chloride 0.9 % SOLN Irrigate 20 cc each nostril QID X 2 month supply 10/20/16  Yes Antonio Chávez MD   blood glucose monitoring (NO BRAND SPECIFIED) test strip Use to test blood sugars one time daily or as directed    Accucheck Richards plus test strips 10/6/16  Yes Wegener, Joel Daniel Irwin, MD   albuterol (PROAIR HFA, PROVENTIL HFA, VENTOLIN HFA) 108 (90 BASE) MCG/ACT inhaler Inhale 2 puffs into the lungs every 6 hours as needed for shortness of breath / dyspnea or wheezing Ventolin or other covered brand 9/28/16  Yes Wegener, Joel Daniel Irwin, MD   Syringe, Disposable, 25 ML MISC Syringe to be used for nasal irrigation 8/19/16  Yes Antonio Chávez MD   omeprazole (PRILOSEC) 40 MG capsule Take 1 capsule (40 mg) by mouth daily Take 30-60 minutes before a meal.  Patient taking differently: Take 40 mg by mouth daily as needed Take 30-60 minutes before a meal. 5/19/16  Yes Wegener, Joel Daniel Irwin, MD   Alcohol Swabs (ALCOHOL PADS) 70 % PADS 1 Box 4 times daily 3/1/16  Yes Wegener, Joel Daniel Irwin, MD   COMPRESSION STOCKINGS Knee high compression socks 30-40-mm 11/6/15  Yes Wegener, Joel Daniel Irwin, MD   blood glucose monitoring (FREESTYLE) lancets Use to test blood sugars 2 times daily or as directed. 4/7/15  Yes Curtis Ward MD   Blood Glucose Monitoring Suppl (ACURA BLOOD GLUCOSE METER) W/DEVICE KIT 1 Dose 2 times daily 4/14/14  Yes Estrella Bowman NP   ORDER FOR DME Equipment being ordered: BP cuff 1/13/14  Yes Estrella Bowman NP   order for DME Equipment being ordered: one donut for sitting 12/19/16   Wegener, Joel Daniel Irwin, MD   potassium chloride SA (POTASSIUM CHLORIDE) 20 MEQ tablet Take 3 tablets (60  "mEq) by mouth daily  Patient not taking: Reported on 10/3/2017 5/19/16   Wegener, Joel Daniel Irwin, MD   ACE/ARB NOT PRESCRIBED, INTENTIONAL, ACE & ARB not prescribed due to Allergy 8/2/12   Ksenia Bean APRN CNP       Allergies  Allergies   Allergen Reactions     Lisinopril Anaphylaxis     Swollen tongue; inability to swallow; drooling; hives; swollen face, neck, angioedema     Acetaminophen      Hx of cirrhosis      Amlodipine      Swelling, hives, possible angioedema       Morphine      Hypotension     Bactrim [Sulfamethoxazole W/Trimethoprim] Rash     Levaquin Swelling and Rash     Swelling in lip and tongue felt thick       Review of systems  A 10-point review of systems was negative    Examination  BP (!) 191/100 (BP Location: Left arm, Patient Position: Sitting, Cuff Size: Adult Regular)  Pulse 61  Temp 98.4  F (36.9  C) (Oral)  Resp 18  Ht 1.778 m (5' 10\")  Wt 101.2 kg (223 lb)  SpO2 98%  BMI 32 kg/m2  Body mass index is 32 kg/(m^2).    Gen- well, NAD, A+Ox3, normal color  Lym- no palpable LAD  CVS- RRR  RS- CTA  Abd- mildly obese. No hepatosplenomegaly.  Extr- hands normal, no EVA  Skin- no rash or jaundice  Neuro- no asterixis  Psych- normal mood    Laboratory  Lab Results   Component Value Date     10/03/2017    POTASSIUM 3.8 10/03/2017    CHLORIDE 106 10/03/2017    CO2 29 10/03/2017    BUN 6 10/03/2017    CR 0.64 10/03/2017       Lab Results   Component Value Date    BILITOTAL 0.7 10/03/2017    ALT 30 10/03/2017    AST 19 10/03/2017    ALKPHOS 136 10/03/2017       Lab Results   Component Value Date    ALBUMIN 4.0 10/03/2017    PROTTOTAL 8.4 10/03/2017        Lab Results   Component Value Date    WBC 8.1 10/03/2017    HGB 13.4 10/03/2017    MCV 83 10/03/2017     10/03/2017       Lab Results   Component Value Date    INR 1.09 10/03/2017       Radiology    Assessment and plan:  Alcoholic liver disease.  Mr. Aguilar has alcoholic liver disease.  He did abstain from alcoholic " beverages and he stabilized.  He does not have any significant fluid retention.  He did not show any signs of encephalopathy, although he is on no medications.  His MELD score is, above all, very low at 7.  We did explain to him that to be eligible for transplant, this needs to be above 15.  He understands that.  If he needs to go on lactulose again, he is not tolerating it, so rifaximin might be an option, but he appears to be very lucid and clear.  His last upper endoscopy was done 05/12/2016 and at that time, it was read that he did not have any residual esophageal varices.  We will continue doing surveillance for HCC.  He will need his labs done every 6 months.  He will follow for his other medical issues with his primary care physician.      This was a 25-minute visit, of which more than 50% was spent in explaining to the patient what our plan of care was.  We answered all his questions.      cc:  Primary care physician       Kimberly Ramirez MD  Hepatology  [facility]    Kimberly Ramirez MD

## 2017-10-03 NOTE — PROGRESS NOTES
[facility]    Hepatology follow-up    Follow-up visit for  alcoholic cirrhosis.    Subjective:  Mr. Aguilar is a 56-year-old  male who we have followed here for alcoholic liver disease.  He also has other medical issues which include cauda equina  syndrome and had back surgeries.  He also had neurogenic bladder and bowel problems.  He carries also a diagnosis of diabetes mellitus.  He did present initially with hemetemesis and came to the emergency room.  He did get an EGD and was found to have esophageal varices that were banded.  Since then, he did not have any further bleed.  He had his last endoscopy in 05/2016.  He did get off, on his own, lactulose as he states he cannot swallow because it bothers him and makes him nauseated.  He is not on rifaximin either.  Denies lethargy and  confusion.  Appetite is good.  No nausea or vomiting as of now.  His MELD score is 7 on his latest labs of 10/03/2017.  Patient denies jaundice, lower extremity edema and  abdominal distension. No  fevers, sweats or chills.  Weight stable.      Medical hx Surgical hx   Past Medical History:   Diagnosis Date     Actinic keratosis      Anxiety      Arrhythmia      Asthma      Bleeding disorder (H) 3-27-16     Cancer (H)      Cauda equina spinal cord injury (H)      Chronic pain      Chronic pain syndrome      Chronic sinusitis 5-1-16     Diastasis recti      Esophageal reflux      Esophageal varices in cirrhosis (H) 4/1/2014     Essential hypertension, benign      Generalized anxiety disorder      H/O dental abscess      Hernia      Liver disease 3-     MEDICAL HISTORY OF -      MEDICAL HISTORY OF -      MEDICAL HISTORY OF -      Mild depression (H)      Mixed hyperlipidemia      Nasal polyps 5-1-16     Other chronic pain      Seasonal allergic conjunctivitis      Type II or unspecified type diabetes mellitus without mention of complication, not stated as uncontrolled      Ulcer (H)       Umbilical hernia without mention of obstruction or gangrene 10/2012     Unspecified site of spinal cord injury without evidence of spinal bone injury       Past Surgical History:   Procedure Laterality Date     C APPENDECTOMY  1974     COLONOSCOPY N/A 5/12/2016    Procedure: COMBINED COLONOSCOPY, SINGLE OR MULTIPLE BIOPSY/POLYPECTOMY BY BIOPSY;  Surgeon: Ana Paula Urbina MD;  Location:  GI     ENDOSCOPY UPPER, COLONOSCOPY, COMBINED  10/19/2011    Procedure:COMBINED ENDOSCOPY UPPER, COLONOSCOPY; Upper Endoscopy, Colonoscopy with Polypectomy x4; Surgeon:AMBAR RODRÍGUEZ; Location: OR     ENT SURGERY  1-2016    Ongoing since then     ESOPHAGOSCOPY, GASTROSCOPY, DUODENOSCOPY (EGD), COMBINED  3/28/2014    Procedure: COMBINED ESOPHAGOSCOPY, GASTROSCOPY, DUODENOSCOPY (EGD);  EGD, Gastric Biopsies, Esophageal Banding;  Surgeon: Reyna Tovar MD;  Location:  OR     ESOPHAGOSCOPY, GASTROSCOPY, DUODENOSCOPY (EGD), COMBINED  6/9/2014    Procedure: COMBINED ESOPHAGOSCOPY, GASTROSCOPY, DUODENOSCOPY (EGD);  Surgeon: Curtis Mendez MD;  Location:  GI     ESOPHAGOSCOPY, GASTROSCOPY, DUODENOSCOPY (EGD), COMBINED  7/24/2014    Procedure: COMBINED ESOPHAGOSCOPY, GASTROSCOPY, DUODENOSCOPY (EGD);  Surgeon: Gerard Samaniego MD;  Location:  OR     ESOPHAGOSCOPY, GASTROSCOPY, DUODENOSCOPY (EGD), COMBINED N/A 10/31/2014    Procedure: COMBINED ESOPHAGOSCOPY, GASTROSCOPY, DUODENOSCOPY (EGD);  Surgeon: Gerard Samaniego MD;  Location:  OR     ESOPHAGOSCOPY, GASTROSCOPY, DUODENOSCOPY (EGD), COMBINED N/A 5/12/2016    Procedure: COMBINED ESOPHAGOSCOPY, GASTROSCOPY, DUODENOSCOPY (EGD);  Surgeon: Ana Paula Urbina MD;  Location:  GI     HCL SQUAMOUS CELL CARCINOMA AG  10/07    left forearm     HERNIORRHAPHY UMBILICAL  11/8/2012    Procedure: HERNIORRHAPHY UMBILICAL;  Open Umbilical Hernia Repair With Mesh ;  Surgeon: Chase Nicholson MD;  Location:  OR     neuro stimulator  2010      SURGICAL HISTORY OF -   1/02    abcess tooth     SURGICAL HISTORY OF -   1999    L4-5 laminectomy, cauda equina syndrome     SURGICAL HISTORY OF -   +    herniated disk repair     TONSILLECTOMY  10 1964     TRANSPOSITION ULNAR NERVE (ELBOW)      right          Medications  Prior to Admission medications    Medication Sig Start Date End Date Taking? Authorizing Provider   baclofen (LIORESAL) 10 MG tablet TAKE 2 TABLETS BY MOUTH THREE TIMES DAILY 9/19/17  Yes Wegener, Joel Daniel Irwin, MD   clindamycin (CLEOCIN) 150 MG capsule OPEN 1 CAP AND PLACE IN 1 LITER OF NORMAL SALINE & MIX. IRRIGATE WITH 20 ML EACH NOSTRIL 4 TIMES PER DAY  Patient taking differently: 4 times daily as needed OPEN 1 CAP AND PLACE IN 1 LITER OF NORMAL SALINE & MIX. IRRIGATE WITH 20 ML EACH NOSTRIL 4 TIMES PER DAY 8/25/17  Yes Antonio Chávez MD   order for DME   Location:sacral ulcer unknown etiology possible sheer    measurement: 2.5 cm x 2 cm x 0.1 cm Unlikely to be a  pressure ulcer but if it is it's a stg 2,  partial thickness, low eexudate    Wound supplies :  5 each Comfeel  Plus Transparent 3530 2 x 2 3/4''  5x7 cm  [facility]     wound care orders: cleans wound, dry thoroughly  and replace Comfeel. Leave in place for 7 days for 5 weeks 8/17/17  Yes Delilah Molina MD   insulin pen needle (BD ROSE U/F) 32G X 4 MM Use one daily as directed. 7/27/17  Yes Wegener, Joel Daniel Irwin, MD   insulin glargine (LANTUS SOLOSTAR) 100 UNIT/ML injection Inject 28 units under the skin daily.  Patient taking differently: Inject 26 units under the skin daily. 6/20/17  Yes Wegener, Joel Daniel Irwin, MD   propranolol (INDERAL) 20 MG tablet Take 1 tablet (20 mg) by mouth 3 times daily 6/8/17  Yes Wegener, Joel Daniel Irwin, MD   sertraline (ZOLOFT) 100 MG tablet Take 2 tablets (200 mg) by mouth daily 3/28/17  Yes Wegener, Joel Daniel Irwin, MD   mupirocin (BACTROBAN) 2 % ointment Apply to anterior nares BID X 2 month supply 2/16/17  Yes  Antonio Chávez MD   promethazine (PHENERGAN) 25 MG tablet Take 1 tablet (25 mg) by mouth every 6 hours as needed for nausea 1/24/17  Yes Wegener, Joel Daniel Irwin, MD   sodium chloride 0.9 % SOLN Irrigate 20 cc each nostril QID X 2 month supply 10/20/16  Yes Antonio Chávez MD   blood glucose monitoring (NO BRAND SPECIFIED) test strip Use to test blood sugars one time daily or as directed    Accucheck Richards plus test strips 10/6/16  Yes Wegener, Joel Daniel Irwin, MD   albuterol (PROAIR HFA, PROVENTIL HFA, VENTOLIN HFA) 108 (90 BASE) MCG/ACT inhaler Inhale 2 puffs into the lungs every 6 hours as needed for shortness of breath / dyspnea or wheezing Ventolin or other covered brand 9/28/16  Yes Wegener, Joel Daniel Irwin, MD   Syringe, Disposable, 25 ML MISC Syringe to be used for nasal irrigation 8/19/16  Yes Antonio Chávez MD   omeprazole (PRILOSEC) 40 MG capsule Take 1 capsule (40 mg) by mouth daily Take 30-60 minutes before a meal.  Patient taking differently: Take 40 mg by mouth daily as needed Take 30-60 minutes before a meal. 5/19/16  Yes Wegener, Joel Daniel Irwin, MD   Alcohol Swabs (ALCOHOL PADS) 70 % PADS 1 Box 4 times daily 3/1/16  Yes Wegener, Joel Daniel Irwin, MD   COMPRESSION STOCKINGS Knee high compression socks 30-40-mm 11/6/15  Yes Wegener, Joel Daniel Irwin, MD   blood glucose monitoring (FREESTYLE) lancets Use to test blood sugars 2 times daily or as directed. 4/7/15  Yes Curtis Ward MD   Blood Glucose Monitoring Suppl (ACURA BLOOD GLUCOSE METER) W/DEVICE KIT 1 Dose 2 times daily 4/14/14  Yes Estrella Bowman NP   ORDER FOR DME Equipment being ordered: BP cuff 1/13/14  Yes Estrella Bowman NP   order for DME Equipment being ordered: one donut for sitting 12/19/16   Wegener, Joel Daniel Irwin, MD   potassium chloride SA (POTASSIUM CHLORIDE) 20 MEQ tablet Take 3 tablets (60 mEq) by mouth daily  Patient not taking: Reported on 10/3/2017 5/19/16   Wegener,  "Demario Hammonds MD   ACE/ARB NOT PRESCRIBED, INTENTIONAL, ACE & ARB not prescribed due to Allergy 8/2/12   Ksenia Bean APRN CNP       Allergies  Allergies   Allergen Reactions     Lisinopril Anaphylaxis     Swollen tongue; inability to swallow; drooling; hives; swollen face, neck, angioedema     Acetaminophen      Hx of cirrhosis      Amlodipine      Swelling, hives, possible angioedema       Morphine      Hypotension     Bactrim [Sulfamethoxazole W/Trimethoprim] Rash     Levaquin Swelling and Rash     Swelling in lip and tongue felt thick       Review of systems  A 10-point review of systems was negative    Examination  BP (!) 191/100 (BP Location: Left arm, Patient Position: Sitting, Cuff Size: Adult Regular)  Pulse 61  Temp 98.4  F (36.9  C) (Oral)  Resp 18  Ht 1.778 m (5' 10\")  Wt 101.2 kg (223 lb)  SpO2 98%  BMI 32 kg/m2  Body mass index is 32 kg/(m^2).    Gen- well, NAD, A+Ox3, normal color  Lym- no palpable LAD  CVS- RRR  RS- CTA  Abd- mildly obese. No hepatosplenomegaly.  Extr- hands normal, no EVA  Skin- no rash or jaundice  Neuro- no asterixis  Psych- normal mood    Laboratory  Lab Results   Component Value Date     10/03/2017    POTASSIUM 3.8 10/03/2017    CHLORIDE 106 10/03/2017    CO2 29 10/03/2017    BUN 6 10/03/2017    CR 0.64 10/03/2017       Lab Results   Component Value Date    BILITOTAL 0.7 10/03/2017    ALT 30 10/03/2017    AST 19 10/03/2017    ALKPHOS 136 10/03/2017       Lab Results   Component Value Date    ALBUMIN 4.0 10/03/2017    PROTTOTAL 8.4 10/03/2017        Lab Results   Component Value Date    WBC 8.1 10/03/2017    HGB 13.4 10/03/2017    MCV 83 10/03/2017     10/03/2017       Lab Results   Component Value Date    INR 1.09 10/03/2017       Radiology    Assessment and plan:  Alcoholic liver disease.  Mr. Aguilar has alcoholic liver disease.  He did abstain from alcoholic beverages and he stabilized.  He does not have any significant fluid retention.  He " did not show any signs of encephalopathy, although he is on no medications.  His MELD score is, above all, very low at 7.  We did explain to him that to be eligible for transplant, this needs to be above 15.  He understands that.  If he needs to go on lactulose again, he is not tolerating it, so rifaximin might be an option, but he appears to be very lucid and clear.  His last upper endoscopy was done 05/12/2016 and at that time, it was read that he did not have any residual esophageal varices.  We will continue doing surveillance for HCC.  He will need his labs done every 6 months.  He will follow for his other medical issues with his primary care physician.      This was a 25-minute visit, of which more than 50% was spent in explaining to the patient what our plan of care was.  We answered all his questions.      cc:  Primary care physician       Kimberly Ramirez MD  Hepatology  [facility]

## 2017-10-03 NOTE — NURSING NOTE
"Chief Complaint   Patient presents with     RECHECK     alcohol induced cirrhosis       Initial BP (!) 191/100 (BP Location: Left arm, Patient Position: Sitting, Cuff Size: Adult Regular)  Pulse 61  Temp 98.4  F (36.9  C) (Oral)  Resp 18  Ht 1.778 m (5' 10\")  Wt 101.2 kg (223 lb)  SpO2 98%  BMI 32 kg/m2 Estimated body mass index is 32 kg/(m^2) as calculated from the following:    Height as of this encounter: 1.778 m (5' 10\").    Weight as of this encounter: 101.2 kg (223 lb).  Medication Reconciliation: complete     Mildred Zhao [facility]  10/3/2017 9:24 AM        "

## 2017-10-03 NOTE — MR AVS SNAPSHOT
After Visit Summary   10/3/2017    Erwin Aguilar    MRN: [id number]           Patient Information     Date Of Birth          1960        Visit Information        Provider Department      10/3/2017 9:20 AM Kimberly Ramirez MD [facility] Hepatology        Today's Diagnoses     Alcoholic cirrhosis of liver without ascites (H)    -  1    Esophageal varices in cirrhosis (H)           Follow-ups after your visit        Follow-up notes from your care team     Return in about 6 months (around 4/3/2018).      Your next 10 appointments already scheduled     Oct 09, 2017  2:30 PM CDT   RETURN WOUND NURSE VISIT with Breanna Sahni RN   [facility] Wound Ostomy ([facility] [place])    [address]  [address] [facility] [address] [phone]              Future tests that were ordered for you today     Open Future Orders        Priority Expected Expires Ordered    Basic metabolic panel Routine 4/3/2018 10/3/2018 10/3/2017    INR Routine 4/3/2018 10/3/2018 10/3/2017    US Abdomen Complete Routine 4/3/2018 10/3/2018 10/3/2017    Hepatic panel Routine 4/3/2018 10/3/2018 10/3/2017    CBC with platelets Routine 4/3/2018 10/3/2018 10/3/2017            Who to contact     If you have questions or need follow up information about today's clinic visit or your schedule please contact [facility] directly at [phone].  Normal or non-critical lab and imaging results will be communicated to you by MyChart, letter or phone within 4 business days after the clinic has received the results. If you do not hear from us within 7 days, please contact the clinic through MyChart or phone. If you have a critical or abnormal lab result, we will notify you by phone as soon as possible.  Submit refill requests through [employer] or call your pharmacy and they will forward the refill request to us. Please allow 3 business days for your refill to be completed.          Additional  "Information About Your Visit        MyChart Information     [employer] gives you secure access to your electronic health record. If you see a primary care provider, you can also send messages to your care team and make appointments. If you have questions, please call your primary care clinic.  If you do not have a primary care provider, please call [phone] and they will assist you.        Care EveryWhere ID     This is your Care EveryWhere ID. This could be used by other organizations to access your [place] medical records  [id number]        Your Vitals Were     Pulse Temperature Respirations Height Pulse Oximetry BMI (Body Mass Index)    61 98.4  F (36.9  C) (Oral) 18 1.778 m (5' 10\") 98% 32 kg/m2       Blood Pressure from Last 3 Encounters:   10/03/17 (!) 191/100   05/09/17 136/78   04/11/17 167/83    Weight from Last 3 Encounters:   10/03/17 101.2 kg (223 lb)   05/09/17 108 kg (238 lb)   04/11/17 100.7 kg (222 lb)              Today, you had the following     No orders found for display         Today's Medication Changes          These changes are accurate as of: 10/3/17 11:59 PM.  If you have any questions, ask your nurse or doctor.               These medicines have changed or have updated prescriptions.        Dose/Directions    clindamycin 150 MG capsule   Commonly known as:  CLEOCIN   This may have changed:    - when to take this  - reasons to take this  - additional instructions   Used for:  Chronic sinusitis, unspecified location        OPEN 1 CAP AND PLACE IN 1 LITER OF NORMAL SALINE & MIX. IRRIGATE WITH 20 ML EACH NOSTRIL 4 TIMES PER DAY   Quantity:  10 capsule   Refills:  3       insulin glargine 100 UNIT/ML injection   Commonly known as:  LANTUS SOLOSTAR   This may have changed:  additional instructions   Used for:  Type 2 diabetes mellitus without complication, with long-term current use of insulin (H)        Inject 28 units under the skin daily.   Quantity:  15 mL   Refills:  11       omeprazole " 40 MG capsule   Commonly known as:  priLOSEC   This may have changed:    - when to take this  - reasons to take this  - additional instructions   Used for:  Gastroesophageal reflux disease without esophagitis        Dose:  40 mg   Take 1 capsule (40 mg) by mouth daily Take 30-60 minutes before a meal.   Quantity:  90 capsule   Refills:  3                Primary Care Provider Office Phone # Fax #    Joel Daniel Irwin Wegener, [phone]       [address] [address]        Equal Access to Services     LI GENAO : Hadii aad ku hadasho Soomaali, waaxda luqadaha, qaybta kaalmada adeegyada, waxay idiin hayaan adeeg kholayinka allison . So [facility] [phone].    ATENCIÓN: Si habla español, tiene a camp disposición servicios gratuitos de asistencia lingüística. [facility] [phone].    We comply with applicable federal civil rights laws and [place] laws. We do not discriminate on the basis of race, color, national origin, age, disability, sex, sexual orientation, or gender identity.            Thank you!     Thank you for choosing [facility]  for your care. Our goal is always to provide you with excellent care. Hearing back from our patients is one way we can continue to improve our services. Please take a few minutes to complete the written survey that you may receive in the mail after your visit with us. Thank you!             Your Updated Medication List - Protect others around you: Learn how to safely use, store and throw away your medicines at www.disposemymeds.org.          This list is accurate as of: 10/3/17 11:59 PM.  Always use your most recent med list.                   Brand Name Dispense Instructions for use Diagnosis    * ACE/ARB NOT PRESCRIBED (INTENTIONAL)      ACE & ARB not prescribed due to Allergy        ACURA BLOOD GLUCOSE METER W/DEVICE Kit     1 kit    1 Dose 2 times daily    Type 2 diabetes, HbA1c goal < 7% (H)       albuterol 108 (90 BASE) MCG/ACT Inhaler     PROAIR HFA/PROVENTIL HFA/VENTOLIN HFA    3 Inhaler    Inhale 2 puffs into the lungs every 6 hours as needed for shortness of breath / dyspnea or wheezing Ventolin or other covered brand    Moderate persistent asthma without complication       Alcohol Pads 70 % Pads     100 each    1 Box 4 times daily    Type 2 diabetes, HbA1c goal < 7% (H)       baclofen 10 MG tablet    LIORESAL    180 tablet    TAKE 2 TABLETS BY MOUTH THREE TIMES DAILY    Muscle spasms of both lower extremities, Back muscle spasm       blood glucose monitoring lancets     1 Box    Use to test blood sugars 2 times daily or as directed.    Type 2 diabetes, HbA1c goal < 7% (H)       blood glucose monitoring test strip    no brand specified    1 Box    Use to test blood sugars one time daily or as directed  Accucheck Richards plus test strips    Type 2 diabetes, HbA1c goal < 7% (H)       clindamycin 150 MG capsule    CLEOCIN    10 capsule    OPEN 1 CAP AND PLACE IN 1 LITER OF NORMAL SALINE & MIX. IRRIGATE WITH 20 ML EACH NOSTRIL 4 TIMES PER DAY    Chronic sinusitis, unspecified location       COMPRESSION STOCKINGS     2 each    Knee high compression socks 30-40-mm    Lymphedema of both lower extremities       insulin glargine 100 UNIT/ML injection    LANTUS SOLOSTAR    15 mL    Inject 28 units under the skin daily.    Type 2 diabetes mellitus without complication, with long-term current use of insulin (H)       insulin pen needle 32G X 4 MM    BD ROSE U/F    90 each    Use one daily as directed.    Type 2 diabetes mellitus without complication (H)       mupirocin 2 % ointment    BACTROBAN    2 g    Apply to anterior nares BID X 2 month supply    Nasal lesion, Nasal vestibulitis       omeprazole 40 MG capsule    priLOSEC    90 capsule    Take 1 capsule (40 mg) by mouth daily Take 30-60 minutes before a meal.    Gastroesophageal reflux disease without esophagitis       * order for DME     1 Device    Equipment being ordered: BP cuff    Hypertension goal BP  (blood pressure) < 140/90       * order for DME     1 Device    Equipment being ordered: one donut for sitting    Pressure ulcer, stage II       * order for DME     5 each    ?Location:sacral ulcer unknown etiology possible sheer ?measurement: 2.5 cm x 2 cm x 0.1 cm Unlikely to be a  pressure ulcer but if it is it's a stg 2,  partial thickness, low eexudate  Wound supplies : 5 each Comfeel? Plus [id number] x 2 3/4''  5x7 cm  [facility]   wound care orders: cleans wound, dry thoroughly  and replace Comfeel. Leave in place for 7 days for 5 weeks    Wound, open, buttock, right, initial encounter       potassium chloride SA 20 MEQ CR tablet    KLOR-CON    180 tablet    Take 3 tablets (60 mEq) by mouth daily    Hypokalemia       promethazine 25 MG tablet    PHENERGAN    40 tablet    Take 1 tablet (25 mg) by mouth every 6 hours as needed for nausea    Nausea without vomiting       propranolol 20 MG tablet    INDERAL    270 tablet    Take 1 tablet (20 mg) by mouth 3 times daily    Esophageal varices in cirrhosis (H)       sertraline 100 MG tablet    ZOLOFT    180 tablet    Take 2 tablets (200 mg) by mouth daily    Generalized anxiety disorder       sodium chloride 0.9 % Soln     14087 mL    Irrigate 20 cc each nostril QID X 2 month supply    Chronic maxillary sinusitis       Syringe (Disposable) 25 ML Misc     1 each    Syringe to be used for nasal irrigation    Chronic maxillary sinusitis       * Notice:  This list has 4 medication(s) that are the same as other medications prescribed for you. Read the directions carefully, and ask your doctor or other care provider to review them with you.

## 2017-10-17 DIAGNOSIS — E11.9 TYPE 2 DIABETES MELLITUS WITHOUT COMPLICATION (H): ICD-10-CM

## 2017-10-17 NOTE — TELEPHONE ENCOUNTER
"APPT INFORMATION    Date /Time: 10/20/17 8:20AM    Reason for Appt: Lumbosacral radiculopathy, has device implanted, would like to discuss getting a new device.    Ref Provider/Clinic: Wegener MD    Patient Contact (Y/N) & Call Details: No, pt was referred.    Action: None      RECORDS CLINIC NAME  (\"None\" if no records ) RECEIVED RECS & IMG? Y/N   (may include other helpful notes)   Internal Clinics:  Clinics Hiawatha Wegener MD (referring)  Recs & referral in [facility] Carlo Fontenot MD  Recs are in [facility] Neuro[facility] Johnny [place]  Previous recs from 3/2012    [facility] [place] NP  Recs are in [facility]    External Clinics: [facility] Faxed cover sheet to attempt to obtain outside recs      "

## 2017-10-18 NOTE — TELEPHONE ENCOUNTER
Records Received From:   [facility]     DATE/EXAM/LOCATION  (specify location if different)   Office Notes:  8/30/17, 3/15/11, 12/23/10, 10/20/10, 10/29/10, 8/9/10  Spinal cord stimulator reprogram: 1/27/11   Operative Notes: -Implantation of two thoracolumbar epidural spinal cord stimulation 10/13/10  - lumbar epidural steroid injection 8/17/10

## 2017-10-19 ENCOUNTER — TELEPHONE (OUTPATIENT)
Dept: ANESTHESIOLOGY | Facility: CLINIC | Age: 57
End: 2017-10-19

## 2017-10-19 RX ORDER — PEN NEEDLE, DIABETIC 32GX 5/32"
NEEDLE, DISPOSABLE MISCELLANEOUS
Qty: 90 EACH | Refills: 0 | Status: SHIPPED | OUTPATIENT
Start: 2017-10-19 | End: 2018-01-07

## 2017-10-19 NOTE — TELEPHONE ENCOUNTER
Call back to pt regarding questions about treatment.  Pt advised that treatment recommendations will be discussed during his clinic visit on 10/20/17.  Pt agreeable to this.  Pt encouraged to contact clinic if needed any at time.     Paula Vega RN, BSN

## 2017-10-19 NOTE — TELEPHONE ENCOUNTER
----- Message from Omega Coley sent at 10/16/2017  3:47 PM CDT -----  Regarding: New pt would like to discuss issues with you.  Contact: [phone]  Pt called regarding referral.    Scheduled appt for 10/20/17.     Would like a call from you to discuss the device that he has implanted, and wants to discuss similar devices so that he can continue to manage his pain.    Please call him at [phone].    Thank You.    [facility]  Call Center    Please DO NOT send this message and/or reply back to sender. Call Center Representatives DO NOT respond to messages.

## 2017-10-19 NOTE — TELEPHONE ENCOUNTER
Prescription approved per [facility] Refill Protocol.  FLORIN AvalosN, RN        insulin pen needle (BD ROSE U/F) 32G X 4 MM      Last Written Prescription Date: 7/27/17  Last Fill Quantity: 90,  # refills: 0   Last Office Visit with [facility], [facility] or [facility] prescribing provider: 5/9/17 with Dr. Wegener

## 2017-10-20 ENCOUNTER — OFFICE VISIT (OUTPATIENT)
Dept: ANESTHESIOLOGY | Facility: CLINIC | Age: 57
End: 2017-10-20

## 2017-10-20 ENCOUNTER — PRE VISIT (OUTPATIENT)
Dept: ANESTHESIOLOGY | Facility: CLINIC | Age: 57
End: 2017-10-20

## 2017-10-20 ENCOUNTER — OFFICE VISIT (OUTPATIENT)
Dept: WOUND CARE | Facility: CLINIC | Age: 57
End: 2017-10-20

## 2017-10-20 VITALS
BODY MASS INDEX: 31.36 KG/M2 | WEIGHT: 224 LBS | OXYGEN SATURATION: 98 % | HEART RATE: 71 BPM | SYSTOLIC BLOOD PRESSURE: 174 MMHG | HEIGHT: 71 IN | DIASTOLIC BLOOD PRESSURE: 89 MMHG

## 2017-10-20 DIAGNOSIS — M96.1 FAILED BACK SURGICAL SYNDROME: Primary | ICD-10-CM

## 2017-10-20 DIAGNOSIS — M54.16 LUMBAR RADICULOPATHY: ICD-10-CM

## 2017-10-20 DIAGNOSIS — S31.819A WOUND, OPEN, BUTTOCK, RIGHT, INITIAL ENCOUNTER: Primary | ICD-10-CM

## 2017-10-20 DIAGNOSIS — Z45.42 BATTERY END OF LIFE OF SPINAL CORD STIMULATOR: ICD-10-CM

## 2017-10-20 ASSESSMENT — PAIN SCALES - GENERAL: PAINLEVEL: MODERATE PAIN (5)

## 2017-10-20 NOTE — NURSING NOTE
AVS given and reviewed with pt.  Pt verbalized understanding and declined any questions.     Paula Vega, RN, BSN

## 2017-10-20 NOTE — PATIENT INSTRUCTIONS
1. Spinal cord stimulator generator replacement recommended. We will run a prior authorization through your insurance first.     2. Please call financial services at [phone] to inquire about out of pocket costs.     Follow up: Contact the clinic if you want to move forward with the replacement      To speak with a nurse, schedule/reschedule/cancel a clinic appointment, or request a medication refill call: [phone]     You can also reach us by [email or web address]: [email or web address]    For refills, please call on Monday, 1 week before your medication runs out. The doctors are not always in clinic, so this gives us time to get your prescriptions ready.  Please let us know the name of the medication you are requesting a refill of.

## 2017-10-20 NOTE — MR AVS SNAPSHOT
After Visit Summary   10/20/2017    Erwin Aguilar    MRN: [id number]           Patient Information     Date Of Birth          1960        Visit Information        Provider Department      10/20/2017 10:30 AM Breanna Sahni RN [facility] Wound Ostomy        Today's Diagnoses     Wound, open, buttock, right    -  1       Follow-ups after your visit        Your next 10 appointments already scheduled     Mar 06, 2018  9:00 AM CST   Lab with  LAB   [facility] ([facility])    [address] [place] [address] [phone]            Mar 06, 2018 10:00 AM CST   (Arrive by 9:45 AM)   Return General Liver with Kimberly Ramirez MD   [facility] Hepatology ([facility])    [address] [place] [address] [phone]              Who to contact     Please call your clinic at [phone] to:    Ask questions about your health    Make or cancel appointments    Discuss your medicines    Learn about your test results    Speak to your doctor   If you have compliments or concerns about an experience at your clinic, or if you wish to file a complaint, please contact [facility] Patient Relations at [phone] or email us at [email or web address]         Additional Information About Your Visit        MyChart Information     [employer] gives you secure access to your electronic health record. If you see a primary care provider, you can also send messages to your care team and make appointments. If you have questions, please call your primary care clinic.  If you do not have a primary care provider, please call [phone] and they will assist you.      [employer] is an electronic gateway that provides easy, online access to your medical records. With [employer], you can request a clinic appointment, read your test results, renew a prescription or communicate with  your care team.     To access your existing account, please contact your [facility] or call [phone] for assistance.        Care EveryWhere ID     This is your Care EveryWhere ID. This could be used by other organizations to access your [place] medical records  [id number]         Blood Pressure from Last 3 Encounters:   10/20/17 174/89   10/03/17 (!) 191/100   05/09/17 136/78    Weight from Last 3 Encounters:   10/20/17 101.6 kg (224 lb)   10/03/17 101.2 kg (223 lb)   05/09/17 108 kg (238 lb)              Today, you had the following     No orders found for display         Today's Medication Changes          These changes are accurate as of: 10/20/17  5:31 PM.  If you have any questions, ask your nurse or doctor.               These medicines have changed or have updated prescriptions.        Dose/Directions    clindamycin 150 MG capsule   Commonly known as:  CLEOCIN   This may have changed:    - when to take this  - reasons to take this  - additional instructions   Used for:  Chronic sinusitis, unspecified location        OPEN 1 CAP AND PLACE IN 1 LITER OF NORMAL SALINE & MIX. IRRIGATE WITH 20 ML EACH NOSTRIL 4 TIMES PER DAY   Quantity:  10 capsule   Refills:  3       insulin glargine 100 UNIT/ML injection   Commonly known as:  LANTUS SOLOSTAR   This may have changed:  additional instructions   Used for:  Type 2 diabetes mellitus without complication, with long-term current use of insulin (H)        Inject 28 units under the skin daily.   Quantity:  15 mL   Refills:  11       omeprazole 40 MG capsule   Commonly known as:  priLOSEC   This may have changed:    - when to take this  - reasons to take this  - additional instructions   Used for:  Gastroesophageal reflux disease without esophagitis        Dose:  40 mg   Take 1 capsule (40 mg) by mouth daily Take 30-60 minutes before a meal.   Quantity:  90 capsule   Refills:  3                Primary Care Provider Office Phone # Fax #     Joel Daniel Irwin Wegener, [phone]       [address]  [facility] [address]        Equal Access to Services     DEANGELORENATO BIRGIT : Hadii aad ku yousifo Somigdaliaali, waaxda luqadaha, qaybta kaalmada delmy, donis fallon. So [facility] [phone].    ATENCIÓN: Si habla español, tiene a camp disposición servicios gratuitos de asistencia lingüística. Llame al [phone].    We comply with applicable federal civil rights laws and [place] laws. We do not discriminate on the basis of race, color, national origin, age, disability, sex, sexual orientation, or gender identity.            Thank you!     Thank you for choosing [facility]  for your care. Our goal is always to provide you with excellent care. Hearing back from our patients is one way we can continue to improve our services. Please take a few minutes to complete the written survey that you may receive in the mail after your visit with us. Thank you!             Your Updated Medication List - Protect others around you: Learn how to safely use, store and throw away your medicines at www.disposemymeds.org.          This list is accurate as of: 10/20/17  5:31 PM.  Always use your most recent med list.                   Brand Name Dispense Instructions for use Diagnosis    * ACE/ARB/ARNI NOT PRESCRIBED (INTENTIONAL)      ACE & ARB not prescribed due to Allergy        ACURA BLOOD GLUCOSE METER W/DEVICE Kit     1 kit    1 Dose 2 times daily    Type 2 diabetes, HbA1c goal < 7% (H)       albuterol 108 (90 BASE) MCG/ACT Inhaler    PROAIR HFA/PROVENTIL HFA/VENTOLIN HFA    3 Inhaler    Inhale 2 puffs into the lungs every 6 hours as needed for shortness of breath / dyspnea or wheezing Ventolin or other covered brand    Moderate persistent asthma without complication       Alcohol Pads 70 % Pads     100 each    1 Box 4 times daily    Type 2 diabetes, HbA1c goal < 7% (H)       baclofen 10 MG tablet    LIORESAL    180  tablet    TAKE 2 TABLETS BY MOUTH THREE TIMES DAILY    Muscle spasms of both lower extremities, Back muscle spasm       BD ROSE U/F 32G X 4 MM   Generic drug:  insulin pen needle     90 each    USE ONE DAILY AS DIRECTED.    Type 2 diabetes mellitus without complication (H)       blood glucose monitoring lancets     1 Box    Use to test blood sugars 2 times daily or as directed.    Type 2 diabetes, HbA1c goal < 7% (H)       blood glucose monitoring test strip    no brand specified    1 Box    Use to test blood sugars one time daily or as directed  Accucheck Richards plus test strips    Type 2 diabetes, HbA1c goal < 7% (H)       clindamycin 150 MG capsule    CLEOCIN    10 capsule    OPEN 1 CAP AND PLACE IN 1 LITER OF NORMAL SALINE & MIX. IRRIGATE WITH 20 ML EACH NOSTRIL 4 TIMES PER DAY    Chronic sinusitis, unspecified location       COMPRESSION STOCKINGS     2 each    Knee high compression socks 30-40-mm    Lymphedema of both lower extremities       insulin glargine 100 UNIT/ML injection    LANTUS SOLOSTAR    15 mL    Inject 28 units under the skin daily.    Type 2 diabetes mellitus without complication, with long-term current use of insulin (H)       mupirocin 2 % ointment    BACTROBAN    2 g    Apply to anterior nares BID X 2 month supply    Nasal lesion, Nasal vestibulitis       omeprazole 40 MG capsule    priLOSEC    90 capsule    Take 1 capsule (40 mg) by mouth daily Take 30-60 minutes before a meal.    Gastroesophageal reflux disease without esophagitis       * order for DME     1 Device    Equipment being ordered: BP cuff    Hypertension goal BP (blood pressure) < 140/90       * order for DME     1 Device    Equipment being ordered: one donut for sitting    Pressure ulcer, stage II       * order for DME     5 each    ?Location:sacral ulcer unknown etiology possible sheer ?measurement: 2.5 cm x 2 cm x 0.1 cm Unlikely to be a  pressure ulcer but if it is it's a stg 2,  partial thickness, low eexudate  Wound  supplies : 5 each Comfeel? Plus [id number] x 2 3/4''  5x7 cm  [facility]   wound care orders: cleans wound, dry thoroughly  and replace Comfeel. Leave in place for 7 days for 5 weeks    Wound, open, buttock, right, initial encounter       potassium chloride SA 20 MEQ CR tablet    KLOR-CON    180 tablet    Take 3 tablets (60 mEq) by mouth daily    Hypokalemia       promethazine 25 MG tablet    PHENERGAN    40 tablet    Take 1 tablet (25 mg) by mouth every 6 hours as needed for nausea    Nausea without vomiting       propranolol 20 MG tablet    INDERAL    270 tablet    Take 1 tablet (20 mg) by mouth 3 times daily    Esophageal varices in cirrhosis (H)       sertraline 100 MG tablet    ZOLOFT    180 tablet    Take 2 tablets (200 mg) by mouth daily    Generalized anxiety disorder       sodium chloride 0.9 % Soln     71912 mL    Irrigate 20 cc each nostril QID X 2 month supply    Chronic maxillary sinusitis       Syringe (Disposable) 25 ML Misc     1 each    Syringe to be used for nasal irrigation    Chronic maxillary sinusitis       * Notice:  This list has 4 medication(s) that are the same as other medications prescribed for you. Read the directions carefully, and ask your doctor or other care provider to review them with you.

## 2017-10-20 NOTE — LETTER
10/20/2017       RE: Erwin Aguilar  [address]  [place] [address]     Dear Colleague,    Thank you for referring your patient, Erwin Aguilar, to the [facility]ENSIVE [facility]NAGEMENT at [facility]. Please see a copy of my visit note below.    I had the pleasure of meeting Mr. Erwin Aguilar on 10/20/2017 in the outpatient pain clinic in consult for Dr. Wegener with regards to his lumbar radiculopathy and current spinal cord stimulator placement.   HPI:  Patient presents with past medical history significant for cauda equina syndrome with neurogenic bladder secondary to failed back surgery (discectomy in ), type II diabetes, hypertension, hyperlipidemia, alcoholic cirrhosis of liver (sober x 4 years); he is here today for evaluation of lumbar radiculopathy and new SCS generator. Patient had a [employer]tronic spinal cord stimulator, MyStim, placed seven years ago by [facility]. He was able to increase his functioning and discontinue all opioid use with spinal cord stimulation. Due to a left patellar fracture in , he had device turned off periodically. His generator battery  and was eventually recharged by [employer] representative to restore function. Patient continues to receive stimulation in necessary previous locations, but was told he will soon require a new device due to expectant longevity of MyStim functionality. He is very interested in the new technology of the Medtronic device. Patient consulted with [facility], expressed his concern with his responsibility cost, and was not provided with a clear answer. Therefore, he is here today questioning the process and cost of a new pulse generator.   His pain is located along right posterior thigh, with anterolateral radiation to right lower extremity, along lateral side of right foot. He experiences numbness and tingling of left foot in sock-like pattern. He denies lumbar or SI joint pain; he  states his buttocks are numb bilaterally.          ?  Past Medical History:   Diagnosis Date     Actinic keratosis     aldara     Anxiety      Arrhythmia     prolonged QT     Asthma      Bleeding disorder (H) 3-27-16     Cancer (H)     squamous cell skin CA     Cauda equina spinal cord injury (H)      Chronic pain     right leg     Chronic pain syndrome      Chronic sinusitis 5-1-16     Diastasis recti      Esophageal reflux      Esophageal varices in cirrhosis (H) 4/1/2014    Hospitalized for UGI blee 3/28/14, endoscopy revealed bleeding varices.     Essential hypertension, benign      Generalized anxiety disorder      H/O dental abscess      Hernia      Liver disease 3-     MEDICAL HISTORY OF -     right leg numbness due to back injury     MEDICAL HISTORY OF -     alcoholism     MEDICAL HISTORY OF -     squamous cell     Mild depression (H)      Mixed hyperlipidemia      Nasal polyps 5-1-16     Other chronic pain      Seasonal allergic conjunctivitis      Type II or unspecified type diabetes mellitus without mention of complication, not stated as uncontrolled      Ulcer (H)     R foot     Umbilical hernia without mention of obstruction or gangrene 10/2012     Unspecified site of spinal cord injury without evidence of spinal bone injury     past medical history reviewed with patient.   Past Surgical History:   Procedure Laterality Date     C APPENDECTOMY  1974     COLONOSCOPY N/A 5/12/2016    Procedure: COMBINED COLONOSCOPY, SINGLE OR MULTIPLE BIOPSY/POLYPECTOMY BY BIOPSY;  Surgeon: Ana Paula Urbina MD;  Location:  GI     ENDOSCOPY UPPER, COLONOSCOPY, COMBINED  10/19/2011    Procedure:COMBINED ENDOSCOPY UPPER, COLONOSCOPY; Upper Endoscopy, Colonoscopy with Polypectomy x4; Surgeon:AMBAR RODRÍGUEZ; [facility] OR     ENT SURGERY  1-2016    Ongoing since then     ESOPHAGOSCOPY, GASTROSCOPY, DUODENOSCOPY (EGD), COMBINED  3/28/2014    Procedure: COMBINED ESOPHAGOSCOPY, GASTROSCOPY,  DUODENOSCOPY (EGD);  EGD, Gastric Biopsies, Esophageal Banding;  Surgeon: Reyna Tovar MD;  [facility] OR     ESOPHAGOSCOPY, GASTROSCOPY, DUODENOSCOPY (EGD), COMBINED  6/9/2014    Procedure: COMBINED ESOPHAGOSCOPY, GASTROSCOPY, DUODENOSCOPY (EGD);  Surgeon: Curtis Mendez MD;  Location: [facility] GI     ESOPHAGOSCOPY, GASTROSCOPY, DUODENOSCOPY (EGD), COMBINED  7/24/2014    Procedure: COMBINED ESOPHAGOSCOPY, GASTROSCOPY, DUODENOSCOPY (EGD);  Surgeon: Gerard Samaniego MD;  [facility] OR     ESOPHAGOSCOPY, GASTROSCOPY, DUODENOSCOPY (EGD), COMBINED N/A 10/31/2014    Procedure: COMBINED ESOPHAGOSCOPY, GASTROSCOPY, DUODENOSCOPY (EGD);  Surgeon: Gerard Samaniego MD;  [facility]     ESOPHAGOSCOPY, GASTROSCOPY, DUODENOSCOPY (EGD), COMBINED N/A 5/12/2016    Procedure: COMBINED ESOPHAGOSCOPY, GASTROSCOPY, DUODENOSCOPY (EGD);  Surgeon: Ana Paula Urbina MD;  [facility] GI     HCL SQUAMOUS CELL CARCINOMA AG  10/07    left forearm     HERNIORRHAPHY UMBILICAL  11/8/2012    Procedure: HERNIORRHAPHY UMBILICAL;  Open Umbilical Hernia Repair With Mesh ;  Surgeon: Chase Nicholson MD;  Location: [facility] OR     neuro stimulator  2010     SURGICAL HISTORY OF -   1/02    abcess tooth     SURGICAL HISTORY OF -   1999    L4-5 laminectomy, cauda equina syndrome     SURGICAL HISTORY OF -   +    herniated disk repair     TONSILLECTOMY  10 1964     TRANSPOSITION ULNAR NERVE (ELBOW)      right    past surgical history reviewed with patient.   Medications:  Current Outpatient Prescriptions   Medication     BD ROSE U/F 32G X 4 MM insulin pen needle     baclofen (LIORESAL) 10 MG tablet     clindamycin (CLEOCIN) 150 MG capsule     order for DME     insulin glargine (LANTUS SOLOSTAR) 100 UNIT/ML injection     propranolol (INDERAL) 20 MG tablet     sertraline (ZOLOFT) 100 MG tablet     mupirocin (BACTROBAN) 2 % ointment     promethazine (PHENERGAN) 25 MG tablet     order for DME     sodium chloride 0.9 % SOLN     blood  "glucose monitoring (NO BRAND SPECIFIED) test strip     albuterol (PROAIR HFA, PROVENTIL HFA, VENTOLIN HFA) 108 (90 BASE) MCG/ACT inhaler     Syringe, Disposable, 25 ML MISC     omeprazole (PRILOSEC) 40 MG capsule     Alcohol Swabs (ALCOHOL PADS) 70 % PADS     COMPRESSION STOCKINGS     blood glucose monitoring (FREESTYLE) lancets     Blood Glucose Monitoring Suppl (ACURA BLOOD GLUCOSE METER) W/DEVICE KIT     ORDER FOR DME     ACE/ARB NOT PRESCRIBED, INTENTIONAL,     potassium chloride SA (POTASSIUM CHLORIDE) 20 MEQ tablet     No current facility-administered medications for this visit.      A multi-state Prescription Monitoring Program reviewed and no aberrancies noted.     Allergies:     Allergies   Allergen Reactions     Lisinopril Anaphylaxis     Swollen tongue; inability to swallow; drooling; hives; swollen face, neck, angioedema     Acetaminophen      Hx of cirrhosis      Amlodipine      Swelling, hives, possible angioedema       Morphine      Hypotension     Bactrim [Sulfamethoxazole W/Trimethoprim] Rash     Levaquin Swelling and Rash     Swelling in lip and tongue felt thick     Family History:  family history includes CANCER in his father and mother; DIABETES in his brother and brother. There is no history of Cancer - colorectal.  Social history: he lives in [place] with his roommate. He is not currently working and is on disability.   Smoking: former smoker. Alcohol: former alcoholic; sober since 3/28/14. Street drugs: yes; marijuana.     ROS:  A 14 point review of systems was completed; results are listed at end of note.     Objective:   Pulse 71  Ht 1.803 m (5' 11\")  Wt 101.6 kg (224 lb)  SpO2 98%  BMI 31.24 kg/m2  Body mass index is 31.24 kg/(m^2).  General: In no apparent distress  Mental status: Normal mood and affect, pleasant  Neuro: Alert, oriented. No sensory deficits.   Head: Atraumatic, normocephalic  Eyes: Extra-ocular movements grossly intact, no scleral icterus  Neck: Supple, Range of " motion full  Respiratory: Non-labored breathing; No respiratory distress  Skin: No rashes or lesions noted on exposed areas of skin; lumbar surgical scar x 2   Msk:   Lumbar Spine:    No spinous process tenderness with palpation of L3, L4, L5 vertebrae. No SI joint or trochanteric bursa tenderness.  Unable to complete heel/toe walk without difficulty. Right foot drop; AFO brace worn.   Strength 5/5 left: dorsiflexion, plantarflexion, hamstrings, quadriceps.  Strength 3/5 right: hamstrings, quadriceps. Strength 1/5 right: dorsiflexion, plantarflexion.   +1 dorsalis pedis and posterior tibial pulses; lower extremities justin bilateraly.   Patellar reflexes 2+ bilaterally.   Negative straight leg raise bilaterally.   Gait is slow; antalgic, but symmetrical. Ambulates with cane.     Imaging: CT lumbar spine 1/20/15  CT LUMBAR SPINE WITHOUT CONTRAST 1/20/2015 10:42 AM      HISTORY: Left low back pain extending down the left leg.     TECHNIQUE: Axial scans were performed.     COMPARISON: 3/6/2012 CT scan.     FINDINGS: Sagittal images demonstrate normal vertebral body height and  alignment. There is a spinal cord stimulator that enters at the L1-L2  level and extends up into the lower thoracic spine is seen on the  prior study. Axial scans were performed from T12 to sacrum.     T12-L1: No disc herniation or stenosis.     L1-L2: No disc herniation or stenosis.     L2-L3: No disc herniation or stenosis.     L3-L4: No disc herniation or stenosis.     L4-L5: Broad-based disc bulge and osteophytic ridging lateralized to  the left resulting in moderate left foraminal stenosis with nerve root  contact, but no compression. Right neural foramen is patent. Mild  central stenosis. Moderate facet degenerative changes.     L5-S1: Moderate facet degenerative changes. Broad-based disc bulge and  osteophytic ridging lateralizes to the right. Moderate right foraminal  stenosis. Left neural foramen is patent. No central stenosis.  Mild  right subarticular stenosis affecting the descending right S1 nerve  root.     IMPRESSION  IMPRESSION:  1. At L4-L5, there is moderate left foraminal stenosis. Mild central  stenosis.  2. At L5-S1, there is moderate right foraminal stenosis. Mild right  subarticular stenosis affecting the descending right S1 nerve root.  3. No significant change.     Assessment:  1. Failed back syndrome  2. Lumbar radiculopathy   3. Battery end of life of spinal cord stimulator     Plan:   1. Patient education: I went over the above diagnoses and treatment plan with him and answered all of his questions.  2. After a long discussion with patient, it was determined that patient will not likely require new lead placement; will require a new pulse generator. Nursing staff will submit prior authorization to his payer; patient was referred to call Financial Services (phone number provided) to determine patient responsibility in terms of cost. Patient advised to call clinic if he would like to proceed with new generator placement.   3. Follow up: Call clinic if he would like to proceed with new Medtronic generator placement once cost is determined with Financial Services.     Total time spent was 25 minutes, and more than 50% of face to face time was spent in counseling and/or coordination of care regarding the above plan.    Thank you for the consult.   VANCE Benz, [facility]  Department of Anesthesiology

## 2017-10-20 NOTE — PROGRESS NOTES
I had the pleasure of meeting Mr. Erwin Aguilar on 10/20/2017 in the outpatient pain clinic in consult for Dr. Wegener with regards to his lumbar radiculopathy and current spinal cord stimulator placement.   HPI:  Patient presents with past medical history significant for cauda equina syndrome with neurogenic bladder secondary to failed back surgery (discectomy in ), type II diabetes, hypertension, hyperlipidemia, alcoholic cirrhosis of liver (sober x 4 years); he is here today for evaluation of lumbar radiculopathy and new SCS generator. Patient had a [employer]onic spinal cord stimulator, MyStim, placed seven years ago by [facility] clinic. He was able to increase his functioning and discontinue all opioid use with spinal cord stimulation. Due to a left patellar fracture in , he had device turned off periodically. His generator battery  and was eventually recharged by [employer] representative to restore function. Patient continues to receive stimulation in necessary previous locations, but was told he will soon require a new device due to expectant longevity of MyStim functionality. He is very interested in the new technology of the Medtronic device. Patient consulted with [facility], expressed his concern with his responsibility cost, and was not provided with a clear answer. Therefore, he is here today questioning the process and cost of a new pulse generator.   His pain is located along right posterior thigh, with anterolateral radiation to right lower extremity, along lateral side of right foot. He experiences numbness and tingling of left foot in sock-like pattern. He denies lumbar or SI joint pain; he states his buttocks are numb bilaterally.          ?  Past Medical History:   Diagnosis Date     Actinic keratosis     aldara     Anxiety      Arrhythmia     prolonged QT     Asthma      Bleeding disorder (H) 3-27-16     Cancer (H)     squamous cell skin CA     Cauda equina spinal cord injury (H)       Chronic pain     right leg     Chronic pain syndrome      Chronic sinusitis 5-1-16     Diastasis recti      Esophageal reflux      Esophageal varices in cirrhosis (H) 4/1/2014    Hospitalized for UGI blee 3/28/14, endoscopy revealed bleeding varices.     Essential hypertension, benign      Generalized anxiety disorder      H/O dental abscess      Hernia      Liver disease 3-     MEDICAL HISTORY OF -     right leg numbness due to back injury     MEDICAL HISTORY OF -     alcoholism     MEDICAL HISTORY OF -     squamous cell     Mild depression (H)      Mixed hyperlipidemia      Nasal polyps 5-1-16     Other chronic pain      Seasonal allergic conjunctivitis      Type II or unspecified type diabetes mellitus without mention of complication, not stated as uncontrolled      Ulcer (H)     R foot     Umbilical hernia without mention of obstruction or gangrene 10/2012     Unspecified site of spinal cord injury without evidence of spinal bone injury     past medical history reviewed with patient.   Past Surgical History:   Procedure Laterality Date     C APPENDECTOMY  1974     COLONOSCOPY N/A 5/12/2016    Procedure: COMBINED COLONOSCOPY, SINGLE OR MULTIPLE BIOPSY/POLYPECTOMY BY BIOPSY;  Surgeon: Ana Paula Urbina MD;  Location:  GI     ENDOSCOPY UPPER, COLONOSCOPY, COMBINED  10/19/2011    Procedure:COMBINED ENDOSCOPY UPPER, COLONOSCOPY; Upper Endoscopy, Colonoscopy with Polypectomy x4; Surgeon:AMBAR RODRÍGUEZ; Location: OR     ENT SURGERY  1-2016    Ongoing since then     ESOPHAGOSCOPY, GASTROSCOPY, DUODENOSCOPY (EGD), COMBINED  3/28/2014    Procedure: COMBINED ESOPHAGOSCOPY, GASTROSCOPY, DUODENOSCOPY (EGD);  EGD, Gastric Biopsies, Esophageal Banding;  Surgeon: Reyna Tovar MD;  Location:  OR     ESOPHAGOSCOPY, GASTROSCOPY, DUODENOSCOPY (EGD), COMBINED  6/9/2014    Procedure: COMBINED ESOPHAGOSCOPY, GASTROSCOPY, DUODENOSCOPY (EGD);  Surgeon: Curtis Mendez MD;  Location:  GI      ESOPHAGOSCOPY, GASTROSCOPY, DUODENOSCOPY (EGD), COMBINED  7/24/2014    Procedure: COMBINED ESOPHAGOSCOPY, GASTROSCOPY, DUODENOSCOPY (EGD);  Surgeon: Gerard Samaniego MD;  [facility]     ESOPHAGOSCOPY, GASTROSCOPY, DUODENOSCOPY (EGD), COMBINED N/A 10/31/2014    Procedure: COMBINED ESOPHAGOSCOPY, GASTROSCOPY, DUODENOSCOPY (EGD);  Surgeon: Gerard Samaniego MD;  [facility]     ESOPHAGOSCOPY, GASTROSCOPY, DUODENOSCOPY (EGD), COMBINED N/A 5/12/2016    Procedure: COMBINED ESOPHAGOSCOPY, GASTROSCOPY, DUODENOSCOPY (EGD);  Surgeon: Ana Paula Urbina MD;  [facility] GI     HCL SQUAMOUS CELL CARCINOMA AG  10/07    left forearm     HERNIORRHAPHY UMBILICAL  11/8/2012    Procedure: HERNIORRHAPHY UMBILICAL;  Open Umbilical Hernia Repair With Mesh ;  Surgeon: Chase Nicholson MD;  [facility]     neuro stimulator  2010     SURGICAL HISTORY OF -   1/02    abcess tooth     SURGICAL HISTORY OF -   1999    L4-5 laminectomy, cauda equina syndrome     SURGICAL HISTORY OF -   +    herniated disk repair     TONSILLECTOMY  10 1964     TRANSPOSITION ULNAR NERVE (ELBOW)      right    past surgical history reviewed with patient.   Medications:  Current Outpatient Prescriptions   Medication     BD ROSE U/F 32G X 4 MM insulin pen needle     baclofen (LIORESAL) 10 MG tablet     clindamycin (CLEOCIN) 150 MG capsule     order for DME     insulin glargine (LANTUS SOLOSTAR) 100 UNIT/ML injection     propranolol (INDERAL) 20 MG tablet     sertraline (ZOLOFT) 100 MG tablet     mupirocin (BACTROBAN) 2 % ointment     promethazine (PHENERGAN) 25 MG tablet     order for DME     sodium chloride 0.9 % SOLN     blood glucose monitoring (NO BRAND SPECIFIED) test strip     albuterol (PROAIR HFA, PROVENTIL HFA, VENTOLIN HFA) 108 (90 BASE) MCG/ACT inhaler     Syringe, Disposable, 25 ML MISC     omeprazole (PRILOSEC) 40 MG capsule     Alcohol Swabs (ALCOHOL PADS) 70 % PADS     COMPRESSION STOCKINGS     blood glucose  "monitoring (FREESTYLE) lancets     Blood Glucose Monitoring Suppl (ACURA BLOOD GLUCOSE METER) W/DEVICE KIT     ORDER FOR DME     ACE/ARB NOT PRESCRIBED, INTENTIONAL,     potassium chloride SA (POTASSIUM CHLORIDE) 20 MEQ tablet     No current facility-administered medications for this visit.      A multi-state Prescription Monitoring Program reviewed and no aberrancies noted.     Allergies:     Allergies   Allergen Reactions     Lisinopril Anaphylaxis     Swollen tongue; inability to swallow; drooling; hives; swollen face, neck, angioedema     Acetaminophen      Hx of cirrhosis      Amlodipine      Swelling, hives, possible angioedema       Morphine      Hypotension     Bactrim [Sulfamethoxazole W/Trimethoprim] Rash     Levaquin Swelling and Rash     Swelling in lip and tongue felt thick     Family History:  family history includes CANCER in his father and mother; DIABETES in his brother and brother. There is no history of Cancer - colorectal.  Social history: he lives in [place] with his roommate. He is not currently working and is on disability.   Smoking: former smoker. Alcohol: former alcoholic; sober since 3/28/14. Street drugs: yes; marijuana.     ROS:  A 14 point review of systems was completed; results are listed at end of note.     Objective:   Pulse 71  Ht 1.803 m (5' 11\")  Wt 101.6 kg (224 lb)  SpO2 98%  BMI 31.24 kg/m2  Body mass index is 31.24 kg/(m^2).  General: In no apparent distress  Mental status: Normal mood and affect, pleasant  Neuro: Alert, oriented. No sensory deficits.   Head: Atraumatic, normocephalic  Eyes: Extra-ocular movements grossly intact, no scleral icterus  Neck: Supple, Range of motion full  Respiratory: Non-labored breathing; No respiratory distress  Skin: No rashes or lesions noted on exposed areas of skin; lumbar surgical scar x 2   Msk:   Lumbar Spine:    No spinous process tenderness with palpation of L3, L4, L5 vertebrae. No SI joint or trochanteric bursa " tenderness.  Unable to complete heel/toe walk without difficulty. Right foot drop; AFO brace worn.   Strength 5/5 left: dorsiflexion, plantarflexion, hamstrings, quadriceps.  Strength 3/5 right: hamstrings, quadriceps. Strength 1/5 right: dorsiflexion, plantarflexion.   +1 dorsalis pedis and posterior tibial pulses; lower extremities justin bilateraly.   Patellar reflexes 2+ bilaterally.   Negative straight leg raise bilaterally.   Gait is slow; antalgic, but symmetrical. Ambulates with cane.     Imaging: CT lumbar spine 1/20/15  CT LUMBAR SPINE WITHOUT CONTRAST 1/20/2015 10:42 AM      HISTORY: Left low back pain extending down the left leg.     TECHNIQUE: Axial scans were performed.     COMPARISON: 3/6/2012 CT scan.     FINDINGS: Sagittal images demonstrate normal vertebral body height and  alignment. There is a spinal cord stimulator that enters at the L1-L2  level and extends up into the lower thoracic spine is seen on the  prior study. Axial scans were performed from T12 to sacrum.     T12-L1: No disc herniation or stenosis.     L1-L2: No disc herniation or stenosis.     L2-L3: No disc herniation or stenosis.     L3-L4: No disc herniation or stenosis.     L4-L5: Broad-based disc bulge and osteophytic ridging lateralized to  the left resulting in moderate left foraminal stenosis with nerve root  contact, but no compression. Right neural foramen is patent. Mild  central stenosis. Moderate facet degenerative changes.     L5-S1: Moderate facet degenerative changes. Broad-based disc bulge and  osteophytic ridging lateralizes to the right. Moderate right foraminal  stenosis. Left neural foramen is patent. No central stenosis. Mild  right subarticular stenosis affecting the descending right S1 nerve  root.     IMPRESSION  IMPRESSION:  1. At L4-L5, there is moderate left foraminal stenosis. Mild central  stenosis.  2. At L5-S1, there is moderate right foraminal stenosis. Mild right  subarticular stenosis affecting the  descending right S1 nerve root.  3. No significant change.     Assessment:  1. Failed back syndrome  2. Lumbar radiculopathy   3. Battery end of life of spinal cord stimulator     Plan:   1. Patient education: I went over the above diagnoses and treatment plan with him and answered all of his questions.  2. After a long discussion with patient, it was determined that patient will not likely require new lead placement; will require a new pulse generator. Nursing staff will submit prior authorization to his payer; patient was referred to call Financial Services (phone number provided) to determine patient responsibility in terms of cost. Patient advised to call clinic if he would like to proceed with new generator placement.   3. Follow up: Call clinic if he would like to proceed with new Medtronic generator placement once cost is determined with Financial Services.     Total time spent was 25 minutes, and more than 50% of face to face time was spent in counseling and/or coordination of care regarding the above plan.    Thank you for the consult.   VANCE Benz, [facility]  Department of Anesthesiology           Answers for HPI/ROS submitted by the patient on 10/20/2017   General Symptoms: No  Skin Symptoms: No  HENT Symptoms: No  EYE SYMPTOMS: No  HEART SYMPTOMS: No  LUNG SYMPTOMS: No  INTESTINAL SYMPTOMS: No  URINARY SYMPTOMS: No  REPRODUCTIVE SYMPTOMS: No  SKELETAL SYMPTOMS: No  BLOOD SYMPTOMS: No  NERVOUS SYSTEM SYMPTOMS: No  MENTAL HEALTH SYMPTOMS: No

## 2017-10-20 NOTE — PROGRESS NOTES
Patient comes to wound clinic for wound assessment per request of dr. Licona. He has history of a ulcer on the right fleshy buttock due to Neuropathic Ulcer: Pressure ulcer, stage II:Located on right buttock near anus,has resolved,Sacral ulcer not reducing in size .    Clean gloves are donned and current dressing removed. Previous treatment includes: colloid  This is treatment week:10  Objective findings: wound near anus has resolved Sacral wound dry and improving   See photo taken today in the media tab.      Location:sacral ulcer unknown etiology    measurement: 3 cm x 3 cm x 0.1 cm dry scabby looking     Wound description: no sign of infection    Tenderness: no    Drainage is scant    Wound Base: Dry pale wound bed, hypertrophic     Periwound Skin: intact, fibrous scarring   Color: normal and consistent with surrounding tissue    Subjective finding:     Pt states: wound seems to be improving with hydrocolloid    Patient is assessed for discomfort which is: moderate     Today's status of the wound: stable, dry but stalled wound. Wound near anus resolved     Treatment: Wound cleansed with Microklenz spray, cover with hydrocolloid and leave in place for 7day then replace. RTC PRn.     Pt received the following instructions:  Cover with hydrocolloid and leave in place for 7day then replace. RTC PRN.Marta Lebron was available for supervision of care if needed or if questions should arise and regarding plan of care. Breanna Sahni RN, CWON

## 2017-10-20 NOTE — MR AVS SNAPSHOT
After Visit Summary   10/20/2017    Erwin Aguilar    MRN: [id number]           Patient Information     Date Of Birth          1960        Visit Information        Provider Department      10/20/2017 8:20 AM Estrella Ruelas APRN [facility]        Care Instructions    1. Spinal cord stimulator generator replacement recommended. We will run a prior authorization through your insurance first.     2. Please call financial services at [phone] to inquire about out of pocket costs.     Follow up: Contact the clinic if you want to move forward with the replacement      To speak with a nurse, schedule/reschedule/cancel a clinic appointment, or request a medication refill call: [phone]     You can also reach us by [employer]: [email or web address]    For refills, please call on Monday, 1 week before your medication runs out. The doctors are not always in clinic, so this gives us time to get your prescriptions ready.  Please let us know the name of the medication you are requesting a refill of.                                     Follow-ups after your visit        Your next 10 appointments already scheduled     Oct 20, 2017  9:15 AM CDT   US ABDOMEN COMPLETE with [facility]   [facility] ([facility])    [address] [address] [phone]           Please bring a list of your medicines (including vitamins, minerals and over-the-counter drugs). Also, tell your doctor about any allergies you may have. Wear comfortable clothes and leave your valuables at home.  Adults: No eating or drinking for 8 hours before the exam. You may take medicine with a small sip of water.  Children: - Children 6+ years: No food or drink for 6 hours before exam. - Children 1-5 years: No food or drink for 4 hours before exam. - Infants, breast-fed: may have breast milk up to 2 hours before  exam. - Infants, formula: may have bottle until 4 hours before exam.  Please call the Imaging Department at your exam site with any questions.            Oct 20, 2017 10:30 AM CDT   RETURN WOUND NURSE VISIT with Breanna Sahni RN   [facility] Wound Ostomy ([facility])    [address] [place] [address]  [address] [facility] [place] [id number] [phone]            Mar 06, 2018  9:00 AM CST   Lab with  LAB   [facility] Lab ([facility])    [address] [place] [address]  [address] [facility] [place] [id number] [phone]            Mar 06, 2018 10:00 AM CST   (Arrive by 9:45 AM)   Return General Liver with Kimberly Ramirez MD   [facility] Hepatology ([facility])    [address] [place] [address]  3[address] [facility] [place] [id number] [phone]              Who to contact     Please call your clinic at [phone] to:    Ask questions about your health    Make or cancel appointments    Discuss your medicines    Learn about your test results    Speak to your doctor   If you have compliments or concerns about an experience at your clinic, or if you wish to file a complaint, please contact [facility] Patient Relations at [phone] or email us at [email or web address]         Additional Information About Your Visit        MyChart Information     [employer] gives you secure access to your electronic health record. If you see a primary care provider, you can also send messages to your care team and make appointments. If you have questions, please call your primary care clinic.  If you do not have a primary care provider, please call [phone] and they will assist you.      [employer] is an electronic gateway that provides easy, online access to your medical records. With [employer], you can request a clinic appointment, read your test results, renew a prescription or communicate with your care team.     To access your existing  "account, please contact your [facility] or call [phone] for assistance.        Care EveryWhere ID     This is your Care EveryWhere ID. This could be used by other organizations to access your [facility] medical records  [id number]        Your Vitals Were     Pulse Height Pulse Oximetry BMI (Body Mass Index)          71 1.803 m (5' 11\") 98% 31.24 kg/m2         Blood Pressure from Last 3 Encounters:   10/20/17 174/89   10/03/17 (!) 191/100   05/09/17 136/78    Weight from Last 3 Encounters:   10/20/17 101.6 kg (224 lb)   10/03/17 101.2 kg (223 lb)   05/09/17 108 kg (238 lb)              Today, you had the following     No orders found for display         Today's Medication Changes          These changes are accurate as of: 10/20/17  9:08 AM.  If you have any questions, ask your nurse or doctor.               These medicines have changed or have updated prescriptions.        Dose/Directions    clindamycin 150 MG capsule   Commonly known as:  CLEOCIN   This may have changed:    - when to take this  - reasons to take this  - additional instructions   Used for:  Chronic sinusitis, unspecified location        OPEN 1 CAP AND PLACE IN 1 LITER OF NORMAL SALINE & MIX. IRRIGATE WITH 20 ML EACH NOSTRIL 4 TIMES PER DAY   Quantity:  10 capsule   Refills:  3       insulin glargine 100 UNIT/ML injection   Commonly known as:  LANTUS SOLOSTAR   This may have changed:  additional instructions   Used for:  Type 2 diabetes mellitus without complication, with long-term current use of insulin (H)        Inject 28 units under the skin daily.   Quantity:  15 mL   Refills:  11       omeprazole 40 MG capsule   Commonly known as:  priLOSEC   This may have changed:    - when to take this  - reasons to take this  - additional instructions   Used for:  Gastroesophageal reflux disease without esophagitis        Dose:  40 mg   Take 1 capsule (40 mg) by mouth daily Take 30-60 minutes before a meal.   Quantity:  90 " capsule   Refills:  3                Primary Care Provider Office Phone # Fax #    Demario Rm Irwin Wegener, [phone] [address] [place] [address]        Equal Access to Services     LI GENAO : Hadlisbet hernandez ku yousifo Somigdaliaali, waaxda luqadaha, qaybta kaalmada adeegyada, waxstephanie mathewsn francisco andres laHanyisidro fallon. So [facility] [phone].    ATENCIÓN: Si habla español, tiene a camp disposición servicios gratuitos de asistencia lingüística. Llame al [phone].    We comply with applicable federal civil rights laws and [place] laws. We do not discriminate on the basis of race, color, national origin, age, disability, sex, sexual orientation, or gender identity.            Thank you!     Thank you for choosing [facility]  for your care. Our goal is always to provide you with excellent care. Hearing back from our patients is one way we can continue to improve our services. Please take a few minutes to complete the written survey that you may receive in the mail after your visit with us. Thank you!             Your Updated Medication List - Protect others around you: Learn how to safely use, store and throw away your medicines at www.disposemymeds.org.          This list is accurate as of: 10/20/17  9:08 AM.  Always use your most recent med list.                   Brand Name Dispense Instructions for use Diagnosis    * ACE/ARB NOT PRESCRIBED (INTENTIONAL)      ACE & ARB not prescribed due to Allergy        ACURA BLOOD GLUCOSE METER W/DEVICE Kit     1 kit    1 Dose 2 times daily    Type 2 diabetes, HbA1c goal < 7% (H)       albuterol 108 (90 BASE) MCG/ACT Inhaler    PROAIR HFA/PROVENTIL HFA/VENTOLIN HFA    3 Inhaler    Inhale 2 puffs into the lungs every 6 hours as needed for shortness of breath / dyspnea or wheezing Ventolin or other covered brand    Moderate persistent asthma without complication       Alcohol Pads 70 % Pads     100 each    1 Box 4  times daily    Type 2 diabetes, HbA1c goal < 7% (H)       baclofen 10 MG tablet    LIORESAL    180 tablet    TAKE 2 TABLETS BY MOUTH THREE TIMES DAILY    Muscle spasms of both lower extremities, Back muscle spasm       BD ROSE U/F 32G X 4 MM   Generic drug:  insulin pen needle     90 each    USE ONE DAILY AS DIRECTED.    Type 2 diabetes mellitus without complication (H)       blood glucose monitoring lancets     1 Box    Use to test blood sugars 2 times daily or as directed.    Type 2 diabetes, HbA1c goal < 7% (H)       blood glucose monitoring test strip    no brand specified    1 Box    Use to test blood sugars one time daily or as directed  Accucheck Richards plus test strips    Type 2 diabetes, HbA1c goal < 7% (H)       clindamycin 150 MG capsule    CLEOCIN    10 capsule    OPEN 1 CAP AND PLACE IN 1 LITER OF NORMAL SALINE & MIX. IRRIGATE WITH 20 ML EACH NOSTRIL 4 TIMES PER DAY    Chronic sinusitis, unspecified location       COMPRESSION STOCKINGS     2 each    Knee high compression socks 30-40-mm    Lymphedema of both lower extremities       insulin glargine 100 UNIT/ML injection    LANTUS SOLOSTAR    15 mL    Inject 28 units under the skin daily.    Type 2 diabetes mellitus without complication, with long-term current use of insulin (H)       mupirocin 2 % ointment    BACTROBAN    2 g    Apply to anterior nares BID X 2 month supply    Nasal lesion, Nasal vestibulitis       omeprazole 40 MG capsule    priLOSEC    90 capsule    Take 1 capsule (40 mg) by mouth daily Take 30-60 minutes before a meal.    Gastroesophageal reflux disease without esophagitis       * order for DME     1 Device    Equipment being ordered: BP cuff    Hypertension goal BP (blood pressure) < 140/90       * order for DME     1 Device    Equipment being ordered: one donut for sitting    Pressure ulcer, stage II       * order for DME     5 each    ?Location:sacral ulcer unknown etiology possible sheer ?measurement: 2.5 cm x 2 cm x 0.1 cm Unlikely  to be a  pressure ulcer but if it is it's a stg 2,  partial thickness, low eexudate  Wound supplies : 5 each Comfeel? Plus [id number] x 2 3/4''  5x7 cm  [facility]   wound care orders: cleans wound, dry thoroughly  and replace Comfeel. Leave in place for 7 days for 5 weeks    Wound, open, buttock, right, initial encounter       potassium chloride SA 20 MEQ CR tablet    KLOR-CON    180 tablet    Take 3 tablets (60 mEq) by mouth daily    Hypokalemia       promethazine 25 MG tablet    PHENERGAN    40 tablet    Take 1 tablet (25 mg) by mouth every 6 hours as needed for nausea    Nausea without vomiting       propranolol 20 MG tablet    INDERAL    270 tablet    Take 1 tablet (20 mg) by mouth 3 times daily    Esophageal varices in cirrhosis (H)       sertraline 100 MG tablet    ZOLOFT    180 tablet    Take 2 tablets (200 mg) by mouth daily    Generalized anxiety disorder       sodium chloride 0.9 % Soln     26463 mL    Irrigate 20 cc each nostril QID X 2 month supply    Chronic maxillary sinusitis       Syringe (Disposable) 25 ML Misc     1 each    Syringe to be used for nasal irrigation    Chronic maxillary sinusitis       * Notice:  This list has 4 medication(s) that are the same as other medications prescribed for you. Read the directions carefully, and ask your doctor or other care provider to review them with you.

## 2017-11-01 ENCOUNTER — TELEPHONE (OUTPATIENT)
Dept: ANESTHESIOLOGY | Facility: CLINIC | Age: 57
End: 2017-11-01

## 2017-11-01 ENCOUNTER — OFFICE VISIT (OUTPATIENT)
Dept: ORTHOPEDICS | Facility: CLINIC | Age: 57
End: 2017-11-01

## 2017-11-01 DIAGNOSIS — L84 TYLOMA: ICD-10-CM

## 2017-11-01 DIAGNOSIS — L85.3 XEROSIS CUTIS: Primary | ICD-10-CM

## 2017-11-01 DIAGNOSIS — S91.209D NAIL AVULSION OF TOE, SUBSEQUENT ENCOUNTER: ICD-10-CM

## 2017-11-01 DIAGNOSIS — L85.3 XEROSIS OF SKIN: Primary | ICD-10-CM

## 2017-11-01 DIAGNOSIS — M54.16 LUMBAR RADICULOPATHY: Primary | ICD-10-CM

## 2017-11-01 RX ORDER — UREA 200 MG/G
CREAM TOPICAL PRN
Qty: 85 G | Refills: 6 | Status: SHIPPED | OUTPATIENT
Start: 2017-11-01 | End: 2018-06-18

## 2017-11-01 RX ORDER — UREA 200 MG/G
CREAM TOPICAL DAILY
Qty: 85 G | Refills: 11 | Status: SHIPPED | OUTPATIENT
Start: 2017-11-01 | End: 2018-06-18

## 2017-11-01 RX ORDER — AMMONIUM LACTATE 12 G/100G
CREAM TOPICAL 2 TIMES DAILY PRN
Qty: 385 G | Refills: 3 | Status: SHIPPED | OUTPATIENT
Start: 2017-11-01 | End: 2022-03-31

## 2017-11-01 NOTE — PROGRESS NOTES
Chief Complaint:   Chief Complaint   Patient presents with     RECHECK     Follow up. Ingrown, bilateral great toes. Pt stated that the doctor has operated on him before and they are growing back.           Allergies   Allergen Reactions     Lisinopril Anaphylaxis     Swollen tongue; inability to swallow; drooling; hives; swollen face, neck, angioedema     Acetaminophen      Hx of cirrhosis      Amlodipine      Swelling, hives, possible angioedema       Morphine      Hypotension     Bactrim [Sulfamethoxazole W/Trimethoprim] Rash     Levaquin Swelling and Rash     Swelling in lip and tongue felt thick         Subjective: Erwin is a 57 year old male who presents to the clinic today for a follow up of bilateral ingrown nails. He relates over the last few weeks the areas that were previously operated on by becoming painful again. He relates he is not performed any new treatments to area. He relates it is most painful when he is in shoes.    Objective    Bilateral nails were inspected today on the halluces. There is pain with palpation on the medial and lateral borders bilaterally. Looking at these nails there is very little nail growth in the areas, and the tissue that appears to be offending and painful is hard, hyperkeratotic nail bed. This was debrided today with a scalpel and a tissue nipper. There is no nail to trim back on the right hallux.    Assessment: Hyperkeratosis BL halluces causing pain.     Plan:   - Pt seen and evaluated  - Some of the hyperkeratotic tissue was removed today. He should resume soaking the feet in warm Epsom salts daily.  - Rx for Urea cream 20%. If not covered, will switch to AmLactin.   - Pt to return to clinic in 1 month.

## 2017-11-01 NOTE — LETTER
11/1/2017       RE: Erwin Aguilar  [address]  [place] [address]     Dear Colleague,    Thank you for referring your patient, Erwin Aguilar, to the [facility] at [facility]. Please see a copy of my visit note below.    Chief Complaint:   Chief Complaint   Patient presents with     RECHECK     Follow up. Ingrown, bilateral great toes. Pt stated that the doctor has operated on him before and they are growing back.           Allergies   Allergen Reactions     Lisinopril Anaphylaxis     Swollen tongue; inability to swallow; drooling; hives; swollen face, neck, angioedema     Acetaminophen      Hx of cirrhosis      Amlodipine      Swelling, hives, possible angioedema       Morphine      Hypotension     Bactrim [Sulfamethoxazole W/Trimethoprim] Rash     Levaquin Swelling and Rash     Swelling in lip and tongue felt thick         Subjective: Erwin is a 57 year old male who presents to the clinic today for a follow up of bilateral ingrown nails. He relates over the last few weeks the areas that were previously operated on by becoming painful again. He relates he is not performed any new treatments to area. He relates it is most painful when he is in shoes.    Objective    Bilateral nails were inspected today on the halluces. There is pain with palpation on the medial and lateral borders bilaterally. Looking at these nails there is very little nail growth in the areas, and the tissue that appears to be offending and painful is hard, hyperkeratotic nail bed. This was debrided today with a scalpel and a tissue nipper. There is no nail to trim back on the right hallux.    Assessment: Hyperkeratosis BL halluces causing pain.     Plan:   - Pt seen and evaluated  - Some of the hyperkeratotic tissue was removed today. He should resume soaking the feet in warm Epsom salts daily.  - Rx for Urea cream 20%. If not covered, will switch to AmLactin.   - Pt to return to  clinic in 1 month.       Again, thank you for allowing me to participate in the care of your patient.      Sincerely,    Victor M Anaya DPM

## 2017-11-01 NOTE — NURSING NOTE
Reason For Visit:   Chief Complaint   Patient presents with     RECHECK     Follow up. Ingrown, bilateral great toes. Pt stated that the doctor has operated on him before and they are growing back.        Pain Assessment  Patient Currently in Pain: Yes  0-10 Pain Scale: 6  Primary Pain Location:  (Hallux)  Pain Orientation: Right, Left  Pain Descriptors: Discomfort           Current Outpatient Prescriptions   Medication Sig Dispense Refill     BD ROSE U/F 32G X 4 MM insulin pen needle USE ONE DAILY AS DIRECTED. 90 each 0     baclofen (LIORESAL) 10 MG tablet TAKE 2 TABLETS BY MOUTH THREE TIMES DAILY 180 tablet 8     clindamycin (CLEOCIN) 150 MG capsule OPEN 1 CAP AND PLACE IN 1 LITER OF NORMAL SALINE & MIX. IRRIGATE WITH 20 ML EACH NOSTRIL 4 TIMES PER DAY (Patient taking differently: 4 times daily as needed OPEN 1 CAP AND PLACE IN 1 LITER OF NORMAL SALINE & MIX. IRRIGATE WITH 20 ML EACH NOSTRIL 4 TIMES PER DAY) 10 capsule 3     order for DME   Location:sacral ulcer unknown etiology possible sheer    measurement: 2.5 cm x 2 cm x 0.1 cm Unlikely to be a  pressure ulcer but if it is it's a stg 2,  partial thickness, low eexudate    Wound supplies :  5 each Comfeel  Plus Transparent 3530 2 x 2 3/4''  5x7 cm  [facility]     wound care orders: cleans wound, dry thoroughly  and replace Comfeel. Leave in place for 7 days for 5 weeks 5 each 3     insulin glargine (LANTUS SOLOSTAR) 100 UNIT/ML injection Inject 28 units under the skin daily. (Patient taking differently: Inject 26 units under the skin daily.) 15 mL 11     propranolol (INDERAL) 20 MG tablet Take 1 tablet (20 mg) by mouth 3 times daily 270 tablet 3     sertraline (ZOLOFT) 100 MG tablet Take 2 tablets (200 mg) by mouth daily 180 tablet 1     mupirocin (BACTROBAN) 2 % ointment Apply to anterior nares BID X 2 month supply 2 g 3     promethazine (PHENERGAN) 25 MG tablet Take 1 tablet (25 mg) by mouth every 6 hours as needed for nausea 40 tablet 11     order for DME  Equipment being ordered: one donut for sitting 1 Device 0     sodium chloride 0.9 % SOLN Irrigate 20 cc each nostril QID X 2 month supply 99058 mL 3     blood glucose monitoring (NO BRAND SPECIFIED) test strip Use to test blood sugars one time daily or as directed    Accucheck Richarsd plus test strips 1 Box 5     albuterol (PROAIR HFA, PROVENTIL HFA, VENTOLIN HFA) 108 (90 BASE) MCG/ACT inhaler Inhale 2 puffs into the lungs every 6 hours as needed for shortness of breath / dyspnea or wheezing Ventolin or other covered brand 3 Inhaler 3     Syringe, Disposable, 25 ML MISC Syringe to be used for nasal irrigation 1 each 3     potassium chloride SA (POTASSIUM CHLORIDE) 20 MEQ tablet Take 3 tablets (60 mEq) by mouth daily (Patient not taking: Reported on 10/3/2017) 180 tablet 3     omeprazole (PRILOSEC) 40 MG capsule Take 1 capsule (40 mg) by mouth daily Take 30-60 minutes before a meal. (Patient taking differently: Take 40 mg by mouth daily as needed Take 30-60 minutes before a meal.) 90 capsule 3     Alcohol Swabs (ALCOHOL PADS) 70 % PADS 1 Box 4 times daily 100 each 3     COMPRESSION STOCKINGS Knee high compression socks 30-40-mm 2 each 1     blood glucose monitoring (FREESTYLE) lancets Use to test blood sugars 2 times daily or as directed. 1 Box 3     Blood Glucose Monitoring Suppl (ACURA BLOOD GLUCOSE METER) W/DEVICE KIT 1 Dose 2 times daily 1 kit 1     ORDER FOR DME Equipment being ordered: BP cuff 1 Device 0     ACE/ARB NOT PRESCRIBED, INTENTIONAL, ACE & ARB not prescribed due to Allergy            Allergies   Allergen Reactions     Lisinopril Anaphylaxis     Swollen tongue; inability to swallow; drooling; hives; swollen face, neck, angioedema     Acetaminophen      Hx of cirrhosis      Amlodipine      Swelling, hives, possible angioedema       Morphine      Hypotension     Bactrim [Sulfamethoxazole W/Trimethoprim] Rash     Levaquin Swelling and Rash     Swelling in lip and tongue felt thick           \

## 2017-11-01 NOTE — MR AVS SNAPSHOT
After Visit Summary   11/1/2017    Erwin Aguilar    MRN: [id number]           Patient Information     Date Of Birth          1960        Visit Information        Provider Department      11/1/2017 8:40 AM Victor M Anaya DPM [facility]        Today's Diagnoses     Xerosis of skin    -  1    Tyloma        Nail avulsion of toe, subsequent encounter           Follow-ups after your visit        Your next 10 appointments already scheduled     Mar 06, 2018  9:00 AM CST   Lab with BRIAN LAB   [facility] ([facility])    [address] [place] [id number] [phone]            Mar 06, 2018 10:00 AM CST   (Arrive by 9:45 AM)   Return General Liver with Kimberly Ramirez MD   [facility] ([facility])    [address] [place] [address] [phone]              Who to contact     Please call your clinic at [phone] to:    Ask questions about your health    Make or cancel appointments    Discuss your medicines    Learn about your test results    Speak to your doctor   If you have compliments or concerns about an experience at your clinic, or if you wish to file a complaint, please contact [facility] Patient Relations at [phone] or email us at [email or web address]         Additional Information About Your Visit        MyChart Information     [employer] gives you secure access to your electronic health record. If you see a primary care provider, you can also send messages to your care team and make appointments. If you have questions, please call your primary care clinic.  If you do not have a primary care provider, please call [phone] and they will assist you.      [employer] is an electronic gateway that provides easy, online access to your medical records. With [employer], you can request a clinic appointment, read  your test results, renew a prescription or communicate with your care team.     To access your existing account, please contact your [facility] or call [phone] for assistance.        Care EveryWhere ID     This is your Care EveryWhere ID. This could be used by other organizations to access your [facility] medical records  [id number]         Blood Pressure from Last 3 Encounters:   10/20/17 174/89   10/03/17 (!) 191/100   05/09/17 136/78    Weight from Last 3 Encounters:   10/20/17 101.6 kg (224 lb)   10/03/17 101.2 kg (223 lb)   05/09/17 108 kg (238 lb)              Today, you had the following     No orders found for display         Today's Medication Changes          These changes are accurate as of: 11/1/17  8:57 AM.  If you have any questions, ask your nurse or doctor.               Start taking these medicines.        Dose/Directions    Urea 20 % Crea cream   Used for:  Xerosis of skin   Started by:  Victor M Anaya DPM        Apply topically as needed   Quantity:  85 g   Refills:  6         These medicines have changed or have updated prescriptions.        Dose/Directions    clindamycin 150 MG capsule   Commonly known as:  CLEOCIN   This may have changed:    - when to take this  - reasons to take this  - additional instructions   Used for:  Chronic sinusitis, unspecified location        OPEN 1 CAP AND PLACE IN 1 LITER OF NORMAL SALINE & MIX. IRRIGATE WITH 20 ML EACH NOSTRIL 4 TIMES PER DAY   Quantity:  10 capsule   Refills:  3       insulin glargine 100 UNIT/ML injection   Commonly known as:  LANTUS SOLOSTAR   This may have changed:  additional instructions   Used for:  Type 2 diabetes mellitus without complication, with long-term current use of insulin (H)        Inject 28 units under the skin daily.   Quantity:  15 mL   Refills:  11       omeprazole 40 MG capsule   Commonly known as:  priLOSEC   This may have changed:    - when to take this  - reasons to take  this  - additional instructions   Used for:  Gastroesophageal reflux disease without esophagitis        Dose:  40 mg   Take 1 capsule (40 mg) by mouth daily Take 30-60 minutes before a meal.   Quantity:  90 capsule   Refills:  3            Where to get your medicines      These medications were sent to [id number] - [place], [place] - [address]  [address] [facility], [place] [address]     Phone:  [phone]     Urea 20 % Crea cream                Primary Care Provider Office Phone # Fax #    Joel Daniel Irwin Wegener, [phone] [address]  [facility] [address]        Equal Access to Services     [facility]: Hadii aad ku hadasho Somigdaliaali, waaxda luqadaha, qaybta kaalmada adeegyada, waxay maylinin haygeon francisco allison . So [facility] [phone].    ATENCIÓN: Si habla español, tiene a camp disposición servicios gratuitos de asistencia lingüística. [facility] [phone].    We comply with applicable federal civil rights laws and [place] laws. We do not discriminate on the basis of race, color, national origin, age, disability, sex, sexual orientation, or gender identity.            Thank you!     Thank you for choosing [facility]  for your care. Our goal is always to provide you with excellent care. Hearing back from our patients is one way we can continue to improve our services. Please take a few minutes to complete the written survey that you may receive in the mail after your visit with us. Thank you!             Your Updated Medication List - Protect others around you: Learn how to safely use, store and throw away your medicines at www.disposemymeds.org.          This list is accurate as of: 11/1/17  8:57 AM.  Always use your most recent med list.                   Brand Name Dispense Instructions for use Diagnosis    * ACE/ARB/ARNI NOT PRESCRIBED (INTENTIONAL)      ACE & ARB not prescribed due to Allergy        ACURA BLOOD GLUCOSE METER W/DEVICE Kit      1 kit    1 Dose 2 times daily    Type 2 diabetes, HbA1c goal < 7% (H)       albuterol 108 (90 BASE) MCG/ACT Inhaler    PROAIR HFA/PROVENTIL HFA/VENTOLIN HFA    3 Inhaler    Inhale 2 puffs into the lungs every 6 hours as needed for shortness of breath / dyspnea or wheezing Ventolin or other covered brand    Moderate persistent asthma without complication       Alcohol Pads 70 % Pads     100 each    1 Box 4 times daily    Type 2 diabetes, HbA1c goal < 7% (H)       baclofen 10 MG tablet    LIORESAL    180 tablet    TAKE 2 TABLETS BY MOUTH THREE TIMES DAILY    Muscle spasms of both lower extremities, Back muscle spasm       BD ROSE U/F 32G X 4 MM   Generic drug:  insulin pen needle     90 each    USE ONE DAILY AS DIRECTED.    Type 2 diabetes mellitus without complication (H)       blood glucose monitoring lancets     1 Box    Use to test blood sugars 2 times daily or as directed.    Type 2 diabetes, HbA1c goal < 7% (H)       blood glucose monitoring test strip    no brand specified    1 Box    Use to test blood sugars one time daily or as directed  Accucheck Richards plus test strips    Type 2 diabetes, HbA1c goal < 7% (H)       clindamycin 150 MG capsule    CLEOCIN    10 capsule    OPEN 1 CAP AND PLACE IN 1 LITER OF NORMAL SALINE & MIX. IRRIGATE WITH 20 ML EACH NOSTRIL 4 TIMES PER DAY    Chronic sinusitis, unspecified location       COMPRESSION STOCKINGS     2 each    Knee high compression socks 30-40-mm    Lymphedema of both lower extremities       insulin glargine 100 UNIT/ML injection    LANTUS SOLOSTAR    15 mL    Inject 28 units under the skin daily.    Type 2 diabetes mellitus without complication, with long-term current use of insulin (H)       mupirocin 2 % ointment    BACTROBAN    2 g    Apply to anterior nares BID X 2 month supply    Nasal lesion, Nasal vestibulitis       omeprazole 40 MG capsule    priLOSEC    90 capsule    Take 1 capsule (40 mg) by mouth daily Take 30-60 minutes before a meal.     Gastroesophageal reflux disease without esophagitis       * order for DME     1 Device    Equipment being ordered: BP cuff    Hypertension goal BP (blood pressure) < 140/90       * order for DME     1 Device    Equipment being ordered: one donut for sitting    Pressure ulcer, stage II       * order for DME     5 each    ?Location:sacral ulcer unknown etiology possible sheer ?measurement: 2.5 cm x 2 cm x 0.1 cm Unlikely to be a  pressure ulcer but if it is it's a stg 2,  partial thickness, low eexudate  Wound supplies : 5 each Comfeel? Plus [id number] x 2 3/4''  5x7 cm  [facility]   wound care orders: cleans wound, dry thoroughly  and replace Comfeel. Leave in place for 7 days for 5 weeks    Wound, open, buttock, right, initial encounter       potassium chloride SA 20 MEQ CR tablet    KLOR-CON    180 tablet    Take 3 tablets (60 mEq) by mouth daily    Hypokalemia       promethazine 25 MG tablet    PHENERGAN    40 tablet    Take 1 tablet (25 mg) by mouth every 6 hours as needed for nausea    Nausea without vomiting       propranolol 20 MG tablet    INDERAL    270 tablet    Take 1 tablet (20 mg) by mouth 3 times daily    Esophageal varices in cirrhosis (H)       sertraline 100 MG tablet    ZOLOFT    180 tablet    Take 2 tablets (200 mg) by mouth daily    Generalized anxiety disorder       sodium chloride 0.9 % Soln     91663 mL    Irrigate 20 cc each nostril QID X 2 month supply    Chronic maxillary sinusitis       Syringe (Disposable) 25 ML Misc     1 each    Syringe to be used for nasal irrigation    Chronic maxillary sinusitis       Urea 20 % Crea cream     85 g    Apply topically as needed    Xerosis of skin       * Notice:  This list has 4 medication(s) that are the same as other medications prescribed for you. Read the directions carefully, and ask your doctor or other care provider to review them with you.

## 2017-11-07 ENCOUNTER — TELEPHONE (OUTPATIENT)
Dept: ANESTHESIOLOGY | Facility: CLINIC | Age: 57
End: 2017-11-07

## 2017-11-13 ENCOUNTER — TELEPHONE (OUTPATIENT)
Dept: ANESTHESIOLOGY | Facility: CLINIC | Age: 57
End: 2017-11-13

## 2017-11-16 ENCOUNTER — TELEPHONE (OUTPATIENT)
Dept: ANESTHESIOLOGY | Facility: CLINIC | Age: 57
End: 2017-11-16

## 2017-11-16 NOTE — TELEPHONE ENCOUNTER
Called patient and explained I am working on holding OR time for him on 12/14/17 starting at 7am.  I will continue to monitor.

## 2017-11-17 NOTE — TELEPHONE ENCOUNTER
Phone call to pt regarding information requested via My Chart message communication.  Pt notified that RNCC is working on identifying a contact with [employer] to help with out of pocket cost estimates for spinal cord stimulator replacement.  Pt advised that he can call [facility] to schedule procedure if he wants to move forward with the generator and lead replacement.  Pt confirmed he has phone number for [facility].  RNCC will follow up with pt with updates.     Call back to pt to follow up with spinal cord stimulator information. Pt offered phone number for [employer] representative who can assist pt with finding answers to out of pocket cost for the type of stimulator replacement needed.  Pt requested phone number be sent to him via My Chart.     Paula Vega, RN, BSN

## 2017-11-29 ENCOUNTER — OFFICE VISIT (OUTPATIENT)
Dept: ANESTHESIOLOGY | Facility: CLINIC | Age: 57
End: 2017-11-29

## 2017-11-29 VITALS
HEIGHT: 71 IN | SYSTOLIC BLOOD PRESSURE: 174 MMHG | DIASTOLIC BLOOD PRESSURE: 91 MMHG | WEIGHT: 227 LBS | HEART RATE: 62 BPM | BODY MASS INDEX: 31.78 KG/M2 | RESPIRATION RATE: 16 BRPM

## 2017-11-29 DIAGNOSIS — T85.192A FAILURE OF SPINAL CORD STIMULATOR, INITIAL ENCOUNTER (H): Primary | ICD-10-CM

## 2017-11-29 ASSESSMENT — PAIN SCALES - GENERAL: PAINLEVEL: MODERATE PAIN (5)

## 2017-11-29 NOTE — LETTER
11/29/2017       RE: Erwin Aguilar  [address]  [place] [address]     Dear Colleague,    Thank you for referring your patient, Erwin Aguilar, to the [facility] at [facility]. Please see a copy of my visit note below.    Patient left before I could evaluate him.    Again, thank you for allowing me to participate in the care of your patient.      Sincerely,    Elvis Sauceda MD

## 2017-11-29 NOTE — MR AVS SNAPSHOT
After Visit Summary   11/29/2017    Erwin Aguilar    MRN: [id number]           Patient Information     Date Of Birth          1960        Visit Information        Provider Department      11/29/2017 8:20 AM Elvis Sauceda MD [facility]        Today's Diagnoses     Failure of spinal cord stimulator, initial encounter (H)    -  1       Follow-ups after your visit        Your next 10 appointments already scheduled     Dec 15, 2017  7:20 AM CST   (Arrive by 7:05 AM)   Return Visit with Elvis Sauceda MD   [facility] ([facility])    [address] [place] [address]  4th Floor  [facility] [id number] [phone]            Mar 06, 2018  9:00 AM CST   Lab with  LAB   [facility] ([facility])    [address] [place] [address]  1st Floor  [facility] [id number] [phone]            Mar 06, 2018 10:00 AM CST   (Arrive by 9:45 AM)   Return General Liver with Kimberly Ramirez MD   [facility] ([facility])    [address] [place] [address]  3rd Floor  [facility] [id number] [phone]              Who to contact     Please call your clinic at [phone] to:    Ask questions about your health    Make or cancel appointments    Discuss your medicines    Learn about your test results    Speak to your doctor   If you have compliments or concerns about an experience at your clinic, or if you wish to file a complaint, please contact [facility] Patient Relations at [phone] or email us at [email or web address]         Additional Information About Your Visit        MyChart Information     [employer] gives you secure access to your electronic health record. If you see a primary care provider, you can also send messages to your care team and make appointments. If you have  "questions, please call your primary care clinic.  If you do not have a primary care provider, please call [phone] and they will assist you.      [employer] is an electronic gateway that provides easy, online access to your medical records. With [employer], you can request a clinic appointment, read your test results, renew a prescription or communicate with your care team.     To access your existing account, please contact your [facility] or call [phone] for assistance.        Care EveryWhere ID     This is your Care EveryWhere ID. This could be used by other organizations to access your [place] medical records  [id number]        Your Vitals Were     Pulse Respirations Height BMI (Body Mass Index)          62 16 1.803 m (5' 11\") 31.66 kg/m2         Blood Pressure from Last 3 Encounters:   11/29/17 (!) 174/91   10/20/17 174/89   10/03/17 (!) 191/100    Weight from Last 3 Encounters:   11/29/17 103 kg (227 lb)   10/20/17 101.6 kg (224 lb)   10/03/17 101.2 kg (223 lb)              Today, you had the following     No orders found for display         Today's Medication Changes          These changes are accurate as of: 11/29/17 10:24 AM.  If you have any questions, ask your nurse or doctor.               These medicines have changed or have updated prescriptions.        Dose/Directions    clindamycin 150 MG capsule   Commonly known as:  CLEOCIN   This may have changed:    - when to take this  - reasons to take this  - additional instructions   Used for:  Chronic sinusitis, unspecified location        OPEN 1 CAP AND PLACE IN 1 LITER OF NORMAL SALINE & MIX. IRRIGATE WITH 20 ML EACH NOSTRIL 4 TIMES PER DAY   Quantity:  10 capsule   Refills:  3       insulin glargine 100 UNIT/ML injection   Commonly known as:  LANTUS SOLOSTAR   This may have changed:  additional instructions   Used for:  Type 2 diabetes mellitus without complication, with long-term current use of insulin (H)        " Inject 28 units under the skin daily.   Quantity:  15 mL   Refills:  11       omeprazole 40 MG capsule   Commonly known as:  priLOSEC   This may have changed:    - when to take this  - reasons to take this  - additional instructions   Used for:  Gastroesophageal reflux disease without esophagitis        Dose:  40 mg   Take 1 capsule (40 mg) by mouth daily Take 30-60 minutes before a meal.   Quantity:  90 capsule   Refills:  3                Primary Care Provider Office Phone # Fax #    Joel Daniel Irwin Wegener, [phone] [address]  [facility] [address]        Equal Access to Services     [facility]: Hadii aad ku hadasho Soomaali, waaxda luqadaha, qaybta kaalmada adesolisyabobo, donis wright adesolis allison . So [facility] [phone].    ATENCIÓN: Si habla español, tiene a camp disposición servicios gratuitos de asistencia lingüística. [facility] [phone].    We comply with applicable federal civil rights laws and [place] laws. We do not discriminate on the basis of race, color, national origin, age, disability, sex, sexual orientation, or gender identity.            Thank you!     Thank you for choosing [facility]  for your care. Our goal is always to provide you with excellent care. Hearing back from our patients is one way we can continue to improve our services. Please take a few minutes to complete the written survey that you may receive in the mail after your visit with us. Thank you!             Your Updated Medication List - Protect others around you: Learn how to safely use, store and throw away your medicines at www.disposemymeds.org.          This list is accurate as of: 11/29/17 10:24 AM.  Always use your most recent med list.                   Brand Name Dispense Instructions for use Diagnosis    * ACE/ARB/ARNI NOT PRESCRIBED (INTENTIONAL)      ACE & ARB not prescribed due to Allergy        ACURA BLOOD GLUCOSE METER W/DEVICE Kit      1 kit    1 Dose 2 times daily    Type 2 diabetes, HbA1c goal < 7% (H)       albuterol 108 (90 BASE) MCG/ACT Inhaler    PROAIR HFA/PROVENTIL HFA/VENTOLIN HFA    3 Inhaler    Inhale 2 puffs into the lungs every 6 hours as needed for shortness of breath / dyspnea or wheezing Ventolin or other covered brand    Moderate persistent asthma without complication       Alcohol Pads 70 % Pads     100 each    1 Box 4 times daily    Type 2 diabetes, HbA1c goal < 7% (H)       ammonium lactate 12 % cream    LAC-HYDRIN    385 g    Apply topically 2 times daily as needed for dry skin    Xerosis cutis       baclofen 10 MG tablet    LIORESAL    180 tablet    TAKE 2 TABLETS BY MOUTH THREE TIMES DAILY    Muscle spasms of both lower extremities, Back muscle spasm       BD ROSE U/F 32G X 4 MM   Generic drug:  insulin pen needle     90 each    USE ONE DAILY AS DIRECTED.    Type 2 diabetes mellitus without complication (H)       blood glucose monitoring lancets     1 Box    Use to test blood sugars 2 times daily or as directed.    Type 2 diabetes, HbA1c goal < 7% (H)       blood glucose monitoring test strip    no brand specified    1 Box    Use to test blood sugars one time daily or as directed  Accucheck Richards plus test strips    Type 2 diabetes, HbA1c goal < 7% (H)       clindamycin 150 MG capsule    CLEOCIN    10 capsule    OPEN 1 CAP AND PLACE IN 1 LITER OF NORMAL SALINE & MIX. IRRIGATE WITH 20 ML EACH NOSTRIL 4 TIMES PER DAY    Chronic sinusitis, unspecified location       COMPRESSION STOCKINGS     2 each    Knee high compression socks 30-40-mm    Lymphedema of both lower extremities       insulin glargine 100 UNIT/ML injection    LANTUS SOLOSTAR    15 mL    Inject 28 units under the skin daily.    Type 2 diabetes mellitus without complication, with long-term current use of insulin (H)       mupirocin 2 % ointment    BACTROBAN    2 g    Apply to anterior nares BID X 2 month supply    Nasal lesion, Nasal vestibulitis        omeprazole 40 MG capsule    priLOSEC    90 capsule    Take 1 capsule (40 mg) by mouth daily Take 30-60 minutes before a meal.    Gastroesophageal reflux disease without esophagitis       * order for DME     1 Device    Equipment being ordered: BP cuff    Hypertension goal BP (blood pressure) < 140/90       * order for DME     1 Device    Equipment being ordered: one donut for sitting    Pressure ulcer, stage II       * order for DME     5 each    ?Location:sacral ulcer unknown etiology possible sheer ?measurement: 2.5 cm x 2 cm x 0.1 cm Unlikely to be a  pressure ulcer but if it is it's a stg 2,  partial thickness, low eexudate  Wound supplies : 5 each Comfeel? Plus [id number] x 2 3/4''  5x7 cm  [facility]   wound care orders: cleans wound, dry thoroughly  and replace Comfeel. Leave in place for 7 days for 5 weeks    Wound, open, buttock, right, initial encounter       potassium chloride SA 20 MEQ CR tablet    KLOR-CON    180 tablet    Take 3 tablets (60 mEq) by mouth daily    Hypokalemia       promethazine 25 MG tablet    PHENERGAN    40 tablet    Take 1 tablet (25 mg) by mouth every 6 hours as needed for nausea    Nausea without vomiting       propranolol 20 MG tablet    INDERAL    270 tablet    Take 1 tablet (20 mg) by mouth 3 times daily    Esophageal varices in cirrhosis (H)       sertraline 100 MG tablet    ZOLOFT    180 tablet    Take 2 tablets (200 mg) by mouth daily    Generalized anxiety disorder       sodium chloride 0.9 % Soln     64614 mL    Irrigate 20 cc each nostril QID X 2 month supply    Chronic maxillary sinusitis       Syringe (Disposable) 25 ML Misc     1 each    Syringe to be used for nasal irrigation    Chronic maxillary sinusitis       * Urea 20 % Crea cream     85 g    Apply topically as needed    Xerosis of skin       * Urea 20 % Crea cream     85 g    Apply topically daily To affected toes    Xerosis of skin, Tyloma, Nail avulsion of toe, subsequent encounter       * Notice:  This  list has 6 medication(s) that are the same as other medications prescribed for you. Read the directions carefully, and ask your doctor or other care provider to review them with you.

## 2017-11-30 ENCOUNTER — TELEPHONE (OUTPATIENT)
Dept: ANESTHESIOLOGY | Facility: CLINIC | Age: 57
End: 2017-11-30

## 2017-11-30 NOTE — TELEPHONE ENCOUNTER
Call back to pt regarding questions.  Pt reported he is scheduled for clinic visit with Dr. Sauceda 12/15/17 and verbalized understanding that spinal cord stimulator surgery will be rescheduled after he is seen in clinic.     Paula Vega, RN, BSN

## 2017-12-11 ENCOUNTER — MYC MEDICAL ADVICE (OUTPATIENT)
Dept: FAMILY MEDICINE | Facility: CLINIC | Age: 57
End: 2017-12-11

## 2017-12-11 DIAGNOSIS — Z79.4 TYPE 2 DIABETES MELLITUS WITHOUT COMPLICATION, WITH LONG-TERM CURRENT USE OF INSULIN (H): Primary | ICD-10-CM

## 2017-12-11 DIAGNOSIS — E11.9 TYPE 2 DIABETES MELLITUS WITHOUT COMPLICATION, WITH LONG-TERM CURRENT USE OF INSULIN (H): Primary | ICD-10-CM

## 2017-12-12 NOTE — TELEPHONE ENCOUNTER
Writer pended Basaglar insulin, but two different instructions on current Lantus script, so defer to provider for dosing.    Note made on Basaglar to profile medication.    FLORIN EscobarN, RN

## 2017-12-13 RX ORDER — INSULIN GLARGINE 100 [IU]/ML
26 INJECTION, SOLUTION SUBCUTANEOUS DAILY
Qty: 30 ML | Refills: 11 | Status: SHIPPED | OUTPATIENT
Start: 2017-12-13 | End: 2019-01-22

## 2017-12-15 ENCOUNTER — OFFICE VISIT (OUTPATIENT)
Dept: ANESTHESIOLOGY | Facility: CLINIC | Age: 57
End: 2017-12-15
Payer: MEDICARE

## 2017-12-15 VITALS — WEIGHT: 220 LBS | OXYGEN SATURATION: 100 % | HEIGHT: 71 IN | BODY MASS INDEX: 30.8 KG/M2 | HEART RATE: 63 BPM

## 2017-12-15 DIAGNOSIS — M79.2 NEUROPATHIC PAIN: ICD-10-CM

## 2017-12-15 DIAGNOSIS — T85.192A FAILURE OF SPINAL CORD STIMULATOR, INITIAL ENCOUNTER (H): Primary | ICD-10-CM

## 2017-12-15 PROCEDURE — 87081 CULTURE SCREEN ONLY: CPT | Performed by: ANESTHESIOLOGY

## 2017-12-15 RX ORDER — CEPHALEXIN 500 MG/1
500 CAPSULE ORAL 4 TIMES DAILY
Qty: 68 CAPSULE | Refills: 0 | Status: SHIPPED | OUTPATIENT
Start: 2017-12-15 | End: 2018-06-18

## 2017-12-15 ASSESSMENT — PAIN SCALES - GENERAL: PAINLEVEL: SEVERE PAIN (6)

## 2017-12-15 NOTE — LETTER
12/15/2017       RE: Erwin Aguilar  [address]  [place] [address]     Dear Colleague,    Thank you for referring your patient, Erwin Aguilar, to the [facility]IVE [facility]NA[facility] at [facility]. Please see a copy of my visit note below.    Subjective:    57 year old male with past medical history of cirrhosis, JATIN, GERD, anemia presents for evaluation of SCS failure.     He underwent SCS implant in October 2010 ([employer]tr[employer]) at [facility] and lead revision in 2011, which stopped working in 2015.     He underwent L5-S1 discectomy in 2009, which was not helpful.    He states that his pain is mainly located below his knees and into his buttocks and the SCS works very well to address his pain symptoms.    On the left lead, 5 of the 8 contacts are bad.    Past Medical History:   Diagnosis Date     Actinic keratosis     aldara     Anxiety      Arrhythmia     prolonged QT     Asthma      Bleeding disorder (H) 3-27-16     Cancer (H)     squamous cell skin CA     Cauda equina spinal cord injury (H)      Chronic pain     right leg     Chronic pain syndrome      Chronic sinusitis 5-1-16     Diastasis recti      Esophageal reflux      Esophageal varices in cirrhosis (H) 4/1/2014    Hospitalized for UGI blee 3/28/14, endoscopy revealed bleeding varices.     Essential hypertension, benign      Generalized anxiety disorder      H/O dental abscess      Hernia      Liver disease 3-     MEDICAL HISTORY OF -     right leg numbness due to back injury     MEDICAL HISTORY OF -     alcoholism     MEDICAL HISTORY OF -     squamous cell     Mild depression (H)      Mixed hyperlipidemia      Nasal polyps 5-1-16     Other chronic pain      Seasonal allergic conjunctivitis      Type II or unspecified type diabetes mellitus without mention of complication, not stated as uncontrolled      Ulcer (H)     R foot     Umbilical hernia without mention of obstruction or  gangrene 10/2012     Unspecified site of spinal cord injury without evidence of spinal bone injury     past medical history reviewed with patient.   Past Surgical History:   Procedure Laterality Date     C APPENDECTOMY  1974     COLONOSCOPY N/A 5/12/2016    Procedure: COMBINED COLONOSCOPY, SINGLE OR MULTIPLE BIOPSY/POLYPECTOMY BY BIOPSY;  Surgeon: Ana Paula Urbina MD;  Location:  GI     ENDOSCOPY UPPER, COLONOSCOPY, COMBINED  10/19/2011    Procedure:COMBINED ENDOSCOPY UPPER, COLONOSCOPY; Upper Endoscopy, Colonoscopy with Polypectomy x4; Surgeon:AMBAR RODRÍGUEZ; Location: OR     ENT SURGERY  1-2016    Ongoing since then     ESOPHAGOSCOPY, GASTROSCOPY, DUODENOSCOPY (EGD), COMBINED  3/28/2014    Procedure: COMBINED ESOPHAGOSCOPY, GASTROSCOPY, DUODENOSCOPY (EGD);  EGD, Gastric Biopsies, Esophageal Banding;  Surgeon: Reyna Tovar MD;  Location:  OR     ESOPHAGOSCOPY, GASTROSCOPY, DUODENOSCOPY (EGD), COMBINED  6/9/2014    Procedure: COMBINED ESOPHAGOSCOPY, GASTROSCOPY, DUODENOSCOPY (EGD);  Surgeon: Curtis Mendez MD;  Location:  GI     ESOPHAGOSCOPY, GASTROSCOPY, DUODENOSCOPY (EGD), COMBINED  7/24/2014    Procedure: COMBINED ESOPHAGOSCOPY, GASTROSCOPY, DUODENOSCOPY (EGD);  Surgeon: Gerard Samaniego MD;  Location:  OR     ESOPHAGOSCOPY, GASTROSCOPY, DUODENOSCOPY (EGD), COMBINED N/A 10/31/2014    Procedure: COMBINED ESOPHAGOSCOPY, GASTROSCOPY, DUODENOSCOPY (EGD);  Surgeon: Gerard Samaniego MD;  Location:  OR     ESOPHAGOSCOPY, GASTROSCOPY, DUODENOSCOPY (EGD), COMBINED N/A 5/12/2016    Procedure: COMBINED ESOPHAGOSCOPY, GASTROSCOPY, DUODENOSCOPY (EGD);  Surgeon: Aan Paula Urbina MD;  Location:  GI     HCL SQUAMOUS CELL CARCINOMA AG  10/07    left forearm     HERNIORRHAPHY UMBILICAL  11/8/2012    Procedure: HERNIORRHAPHY UMBILICAL;  Open Umbilical Hernia Repair With Mesh ;  Surgeon: Chase Nicholson MD;  Location:  OR     neuro stimulator  2010     SURGICAL  HISTORY OF -   1/02    abcess tooth     SURGICAL HISTORY OF -   1999    L4-5 laminectomy, cauda equina syndrome     SURGICAL HISTORY OF -   +    herniated disk repair     TONSILLECTOMY  10 1964     TRANSPOSITION ULNAR NERVE (ELBOW)      right    past surgical history reviewed with patient.     Medications:    Current Outpatient Prescriptions   Medication     BASAGLAR 100 UNIT/ML injection     Urea 20 % CREA cream     Urea 20 % CREA cream     ammonium lactate (LAC-HYDRIN) 12 % cream     BD ROSE U/F 32G X 4 MM insulin pen needle     baclofen (LIORESAL) 10 MG tablet     clindamycin (CLEOCIN) 150 MG capsule     order for DME     propranolol (INDERAL) 20 MG tablet     sertraline (ZOLOFT) 100 MG tablet     mupirocin (BACTROBAN) 2 % ointment     promethazine (PHENERGAN) 25 MG tablet     order for DME     sodium chloride 0.9 % SOLN     blood glucose monitoring (NO BRAND SPECIFIED) test strip     albuterol (PROAIR HFA, PROVENTIL HFA, VENTOLIN HFA) 108 (90 BASE) MCG/ACT inhaler     Syringe, Disposable, 25 ML MISC     potassium chloride SA (POTASSIUM CHLORIDE) 20 MEQ tablet     omeprazole (PRILOSEC) 40 MG capsule     Alcohol Swabs (ALCOHOL PADS) 70 % PADS     COMPRESSION STOCKINGS     blood glucose monitoring (FREESTYLE) lancets     Blood Glucose Monitoring Suppl (ACURA BLOOD GLUCOSE METER) W/DEVICE KIT     ORDER FOR DME     ACE/ARB NOT PRESCRIBED, INTENTIONAL,     No current facility-administered medications for this visit.        [place] and [place] Prescription Monitoring Program reviewed    Allergies:     Allergies   Allergen Reactions     Lisinopril Anaphylaxis     Swollen tongue; inability to swallow; drooling; hives; swollen face, neck, angioedema     Acetaminophen      Hx of cirrhosis      Amlodipine      Swelling, hives, possible angioedema       Morphine      Hypotension     Bactrim [Sulfamethoxazole W/Trimethoprim] Rash     Levaquin Swelling and Rash     Swelling in lip and tongue felt thick       Family History:  family  "history includes CANCER in his father and mother; DIABETES in his brother and brother. There is no history of Cancer - colorectal.    Social history: .  Smoking: none. Alcohol: quit in 2004. Street drugs: marijuana occasionally .     Review Of Systems    14 point ROS negative except per HPI    Objective:     BP (P) 173/87  Pulse 63  Ht 1.803 m (5' 11\")  Wt 99.8 kg (220 lb)  SpO2 100%  BMI 30.68 kg/m2  Body mass index is 30.68 kg/(m^2).    General: In no apparent distress  Mental status: Normal affect, pleasant  Head: Atraumatic, normocephalic  Eyes: Extra-ocular movements grossly intact, no scleral icterus  Cardiovascular: Regular rate  Respiratory: No respiratory distress  Abdomen: soft, non-distended  Msk: Bilateral hips, knees have normal range of motion. Pain with extension and rotation of lumbar spine  Neuro: AAOx3.   CN II-XII are grossly intact.   Strength 5/5 for bilateral shoulder abduction, elbow flexion, wrist extension, elbow extension, finger abduction, and grasp. Sensation intact to light touch throughout the C5-T1 dermatomes of the bilateral upper extremities.  Reflexes 2+/4 and symmetric for biceps, triceps, brachioradialis. Negative 's bilaterally. Neg Spurling's and L'hermitte's  Strength 5/5 for bilateral hip flexion, knee extension, dorsiflexion, EHL, and plantarflexion. Sensation intact to light touch throughout the L2-S1 dermatomes of the bilateral lower extremities. Reflexes 2+/4 and symmetric for bilateral patellar, achilles.  Negative babinski bilaterally. No clonus on ankle jerk  Skin: No rashes or lesions noted on exposed areas of skin  Lymph: no supraclavicular lymphadenopathy      Chaperone present during the entire Physical Exam: None    Imaging: Reviewed    Assessment:    Mr. Aguilar is a 57-year-old male who presents to the pain clinic as a new patient, with a malfunctioning spinal cord stimulator system. He currently has a Medtronic spinal cord stimulator system which is " not MRI compatible, that was implanted by [facility] in 2010. Of note, he required 6 months of antibiotics after his initial implant secondary to surgical site infection. His infection was able to be treated successfully with prolonged antibiotic therapy and the spinal cord stimulator system was not removed or explanted. In 2011, he required a lead revision secondary to migration. Of note, his current system has extensions for the leads. Based on history, physical exam, review the medical record, laboratory studies and radiographs, the most likely diagnoses are neuropathic pain, failed spinal cord stim system, IPG failure, lumbar radiculopathy, and postlaminectomy syndrome. Therefore, I will obtain lumbar flexion extension x-rays, as well as, thoracic x-rays. Upon speaking with Rashid Betts, the [employer] representative for [place], his current system has been re-initialized on 2 separate occasions and his IPG is currently failing. Also, he has 5 out of 8 bad electrodes on the left lead. Therefore, I will schedule Mr. Aguilar for spinal cord stimulator explant surgery and spinal cord stimulator reimplant surgery on the same day with intraoperative testing. Because of his previous infection history, I will also obtain in addition to standard preoperative labs, hemoglobin A1c, as well as, I will initiate Keflex 500 mg or times daily starting 3 days prior to surgery and to be continued for 2 weeks following surgery, for a total of 17 days of therapy. A prescription for Keflex 500 mg q.6 hours #68 tablets was given at today's visit.    Plan:     1. Obtain lumbar flexion and extension x-rays  2. Obtain thoracic x-rays   3. Schedule SCS explant surgery and SCS implant surgery on the same day (4-hour case)  WITH INTRA-OPERATIVE MEDTRONIC TESTING  - previous SCS was infected for 6 months and required antibiotics  4. Start Keflex 500mg Q6H starting 3 days prior to the surgery date (total of 17 days of therapy)  5. Check Hgb  A1c  6. Standard pre-operative labs and work-up        Again, thank you for allowing me to participate in the care of your patient.      Sincerely,    Elvis Sauceda MD

## 2017-12-15 NOTE — NURSING NOTE
AVS given and reviewed with pt.  Pre-procedure instructions reviewed, including NPO orders,  needed, medications to hold, pre-op physical, labs, antibiotic, and chlorhexidine body scrub. Pt verbalized understanding and declined any questions.     Paula Vega, RN, BSN

## 2017-12-15 NOTE — PATIENT INSTRUCTIONS
1. Xrays of thoracic and lumbar spine ordered.     IMAGING SERVICES HOURS:    All imaging modalities are available from 7 a.m. - 9 p.m. Monday through Friday  X-ray, CT, MRI, and General Ultrasound appointments are available from 7 a.m. -3:30 p.m. on Saturdays  X-ray, CT and MRI appointments are available from 8 a.m. - 4:30 p.m. on Sundays  Please call [phone] to schedule imaging exams    2. Schedule surgery.     3. Pre-operative requirements:    A. Schedule pre-op physical with your primary care provider within 30 days of your surgery.     B. Complete lab work 2-4 weeks before your surgery.      C. Start using chlorhexidine body scrub on your back and buttocks daily for 5 days before your surgery.     D. Start taking keflex 3 days before surgery and continue taking for 2 weeks after surgery.     Please call Autumn at [phone] to schedule your procedure. She is available Monday - Friday from 9-5:30pm, if she is unavailable to take your call, please leave her a voice message with your name, birth date, and phone number and she will call you back.    Your procedure: Spinal cord stimulator generator and lead replacement    On the day of the procedure  1. Arrive 1 hour earlier than your scheduled time, to the [facility]  Address: [address] [place] [id number]  2. Check in on the 5th floor for your procedure    A nurse will call you within 24 hours after your procedure to follow up and schedule post-op wound check appointment 1 week after your surgery.     If you must reschedule your procedure more than two times, you must follow up in clinic before rescheduling again.      Patient Pre-Procedure Instructions    CAUTION - FAILURE TO FOLLOW THESE PRE-PROCEDURE INSTRUCTIONS WILL RESULT IN YOUR PROCEDURE BEING RESCHEDULED.            You MUST have a  TO TAKE YOU HOME after your procedure. Transportation by Taxi or Para-transit must have a responsible adult accompany you home  (other than the ). Travel by bus or light rail is not acceptable transportation. You must provide your 's full name and contact number at time of check in.   Fasting Protocol You may have NOTHING SOLID TO EAT FOR 8 HOURS prior to arrival at the procedure area.   Broth and candy are considered solid food and require an eight hour fast.   You may have CLEAR LIQUIDS UP TO 2 HOURS prior to arrival at the clinic.   Clear liquids include water, clear fruit juice (no pulp) carbonated beverages, ice, black coffee, black tea (no milk or cream), chewing gum (un-swallowed), and/or clear jello (no fruit or milk). No alcohol containing beverages.   Medications If you take any medications,  DO NOT STOP. Take your medications as usual the morning of your procedure with a sip of water AT LEAST 2 HOURS PRIOR TO ARRIVAL.    Antibiotics If you are currently taking antibiotics, you must complete the entire dose 7 days prior to your scheduled procedure. You must be clear of any signs or symptoms of infection. If you begin antibiotics, please contact our clinic for instructions.   Fever, Chills, or Rash If you experience a fever of higher than 100 degrees, chills, rash, or open wounds during the one week prior to your procedure, please call the clinic.         Medication Hold List  **Patients under Cardiology/Neurology care should consult their provider prior to the pain procedure to verify pre-procedure medication instructions. The information below contains general guidelines.**    Blood Thinners If you are taking daily ASPIRIN, PLAVIX, OR OTHER BLOOD THINNERS SUCH AS COUMADIN/WARFARIN, we will need your prescribing doctor to sign a release permitting you to stop these medications. Once approved by your prescribing doctor - STOP ALL BLOOD THINNERS BASED ON THE TIME TABLE BELOW PRIOR TO YOUR PROCEDURE. If you have been instructed to stop WARFARIN(COUMADIN), you must have an INR lab drawn the day before your  procedure. . Your INR must be within normal limits before we can perform your injection. MEDICATIONS CAN BE RESTARTED AFTER YOUR PROCEDURE.    14 DAY HOLD  Ticlid (ticlopidine)    10 DAY HOLD  Effient (Prasugel)    3 DAY HOLD  Xarelto (rivaroxaban) 7 DAY HOLD  Anacin, Bufferin, Ecotrin, Excedrin, Aggrenox (Aspirin)  Brilinta (ticagrelor)  Coumadin (Warfarin)  Pradexa (Dabigatran)  Elmiron (Pentosan)  Plavix (Clopidogrel Bisulfate)  Pletal (Cilostazol)    24 DAY HOLD  Lovenox (enoxaparin)  Agrylin (Anagrelide)        Non-steroidal Anti-inflammatories (NSAIDs) DO NOT TAKE any non-steroidal anti-inflammatory medications (NSAIDs) listed on the table below. MEDICATIONS CAN BE RESTARTED AFTER YOUR PROCEDURE. Celebrex is OK to take and does not need to be discontinued.     Medications to stop:  3 DAY HOLD  Advil, Motrin (Ibuprofen)  Arthrotec (diciofenac sodium/misoprostol)  Clinoril (Sulindac)  Indocin (Indomethacin)  Lodine (Etodolac)  Toradol (Ketorolac)  Vicoprofen (Hydrocodone and Ibuprofen)  Voltaren (Diclotenac)    14 DAY HOLD  Daypro (Oxaprozin)  Feldene (Piroxicam)   7 DAY HOLD  Aleve (Naproxen sodium)  Darvon compound (contains aspirin)  Naprosyn (Naproxen)  Norgesic Forte (contains aspirin)  Mobic (Meloxicam)  Oruvall (Ketoprofen)  Percodan (contains aspirin)  Relafen (Nabumetone)  Salsalate  Trilisate  Vitamin E (more than 400 mg per day)  Any medication containing aspirin                To speak with a nurse, schedule/reschedule/cancel a clinic appointment, or request a medication refill call: [phone]     You can also reach us by [employer]: [email or web address]

## 2017-12-15 NOTE — MR AVS SNAPSHOT
After Visit Summary   12/15/2017    Erwin Aguilar    MRN: [id number]           Patient Information     Date Of Birth          1960        Visit Information        Provider Department      12/15/2017 7:20 AM Elvis Sauceda MD [facility]        Today's Diagnoses     Failure of spinal cord stimulator, initial encounter (H)    -  1    Neuropathic pain          Care Instructions    1. Xrays of thoracic and lumbar spine ordered.     IMAGING SERVICES HOURS:    All imaging modalities are available from 7 a.m. - 9 p.m. Monday through Friday  X-ray, CT, MRI, and General Ultrasound appointments are available from 7 a.m. -3:30 p.m. on Saturdays  X-ray, CT and MRI appointments are available from 8 a.m. - 4:30 p.m. on Sundays  Please call [phone] to schedule imaging exams    2. Schedule surgery.     3. Pre-operative requirements:    A. Schedule pre-op physical with your primary care provider within 30 days of your surgery.     B. Complete lab work 2-4 weeks before your surgery.      C. Start using chlorhexidine body scrub on your back and buttocks daily for 5 days before your surgery.     D. Start taking keflex 3 days before surgery and continue taking for 2 weeks after surgery.     Please call Autumn at [phone] to schedule your procedure. She is available Monday - Friday from 9-5:30pm, if she is unavailable to take your call, please leave her a voice message with your name, birth date, and phone number and she will call you back.    Your procedure: Spinal cord stimulator generator and lead replacement    On the day of the procedure  1. Arrive 1 hour earlier than your scheduled time, to the [facility]  Address: [address] [place] [id number]  2. Check in on the 5th floor for your procedure    A nurse will call you within 24 hours after your procedure to follow up and schedule post-op wound check appointment 1 week  after your surgery.     If you must reschedule your procedure more than two times, you must follow up in clinic before rescheduling again.      Patient Pre-Procedure Instructions    CAUTION - FAILURE TO FOLLOW THESE PRE-PROCEDURE INSTRUCTIONS WILL RESULT IN YOUR PROCEDURE BEING RESCHEDULED.            You MUST have a  TO TAKE YOU HOME after your procedure. Transportation by Taxi or Para-transit must have a responsible adult accompany you home (other than the ). Travel by bus or light rail is not acceptable transportation. You must provide your 's full name and contact number at time of check in.   Fasting Protocol You may have NOTHING SOLID TO EAT FOR 8 HOURS prior to arrival at the procedure area.   Broth and candy are considered solid food and require an eight hour fast.   You may have CLEAR LIQUIDS UP TO 2 HOURS prior to arrival at the clinic.   Clear liquids include water, clear fruit juice (no pulp) carbonated beverages, ice, black coffee, black tea (no milk or cream), chewing gum (un-swallowed), and/or clear jello (no fruit or milk). No alcohol containing beverages.   Medications If you take any medications,  DO NOT STOP. Take your medications as usual the morning of your procedure with a sip of water AT LEAST 2 HOURS PRIOR TO ARRIVAL.    Antibiotics If you are currently taking antibiotics, you must complete the entire dose 7 days prior to your scheduled procedure. You must be clear of any signs or symptoms of infection. If you begin antibiotics, please contact our clinic for instructions.   Fever, Chills, or Rash If you experience a fever of higher than 100 degrees, chills, rash, or open wounds during the one week prior to your procedure, please call the clinic.         Medication Hold List  **Patients under Cardiology/Neurology care should consult their provider prior to the pain procedure to verify pre-procedure medication instructions. The information below contains general  guidelines.**    Blood Thinners If you are taking daily ASPIRIN, PLAVIX, OR OTHER BLOOD THINNERS SUCH AS COUMADIN/WARFARIN, we will need your prescribing doctor to sign a release permitting you to stop these medications. Once approved by your prescribing doctor - STOP ALL BLOOD THINNERS BASED ON THE TIME TABLE BELOW PRIOR TO YOUR PROCEDURE. If you have been instructed to stop WARFARIN(COUMADIN), you must have an INR lab drawn the day before your procedure. . Your INR must be within normal limits before we can perform your injection. MEDICATIONS CAN BE RESTARTED AFTER YOUR PROCEDURE.    14 DAY HOLD  Ticlid (ticlopidine)    10 DAY HOLD  Effient (Prasugel)    3 DAY HOLD  Xarelto (rivaroxaban) 7 DAY HOLD  Anacin, Bufferin, Ecotrin, Excedrin, Aggrenox (Aspirin)  Brilinta (ticagrelor)  Coumadin (Warfarin)  Pradexa (Dabigatran)  Elmiron (Pentosan)  Plavix (Clopidogrel Bisulfate)  Pletal (Cilostazol)    24 DAY HOLD  Lovenox (enoxaparin)  Agrylin (Anagrelide)        Non-steroidal Anti-inflammatories (NSAIDs) DO NOT TAKE any non-steroidal anti-inflammatory medications (NSAIDs) listed on the table below. MEDICATIONS CAN BE RESTARTED AFTER YOUR PROCEDURE. Celebrex is OK to take and does not need to be discontinued.     Medications to stop:  3 DAY HOLD  Advil, Motrin (Ibuprofen)  Arthrotec (diciofenac sodium/misoprostol)  Clinoril (Sulindac)  Indocin (Indomethacin)  Lodine (Etodolac)  Toradol (Ketorolac)  Vicoprofen (Hydrocodone and Ibuprofen)  Voltaren (Diclotenac)    14 DAY HOLD  Daypro (Oxaprozin)  Feldene (Piroxicam)   7 DAY HOLD  Aleve (Naproxen sodium)  Darvon compound (contains aspirin)  Naprosyn (Naproxen)  Norgesic Forte (contains aspirin)  Mobic (Meloxicam)  Oruvall (Ketoprofen)  Percodan (contains aspirin)  Relafen (Nabumetone)  Salsalate  Trilisate  Vitamin E (more than 400 mg per day)  Any medication containing aspirin                To speak with a nurse, schedule/reschedule/cancel a clinic appointment, or request  a medication refill call: [phone]     You can also reach us by Stalk[employer]: [email or web address]            Follow-ups after your visit        Your next 10 appointments already scheduled     Mar 06, 2018  9:00 AM CST   Lab with  LAB   [facility] ([facility])    [address]  [address] [facility] [address] [phone]            Mar 06, 2018 10:00 AM CST   (Arrive by 9:45 AM)   Return General Liver with Kimberly Ramirez MD   [facility] ([facility])    [address]  [address] [facility] [address] [phone]              Future tests that were ordered for you today     Open Future Orders        Priority Expected Expires Ordered    CBC with platelets differential Routine  12/15/2018 12/15/2017    Methicillin resistant staph aureus cult Routine  12/16/2018 12/15/2017    XR Lumbar Spine G/E 4 Views Routine 12/15/2017 12/15/2018 12/15/2017    XR Thoracic Spine 3 Views Routine 12/15/2017 12/15/2018 12/15/2017    UA with Microscopic Routine  12/16/2018 12/15/2017            Who to contact     Please call your clinic at [phone] to:    Ask questions about your health    Make or cancel appointments    Discuss your medicines    Learn about your test results    Speak to your doctor   If you have compliments or concerns about an experience at your clinic, or if you wish to file a complaint, please contact [facility] Patient Relations at [phone] or email us at [email or web address]         Additional Information About Your Visit        [email or web address] Information     Stalkthis gives you secure access to your electronic health record. If you see a primary care provider, you can also send messages to your care team and make appointments. If you have questions, please call your primary care clinic.  If you do not have a primary care provider, please call [phone] and  "they will assist you.      [employer] is an electronic gateway that provides easy, online access to your medical records. With [email or web address], you can request a clinic appointment, read your test results, renew a prescription or communicate with your care team.     To access your existing account, please contact your [facility] or call [phone] for assistance.        Care EveryWhere ID     This is your Care EveryWhere ID. This could be used by other organizations to access your [place] medical records  [id number]        Your Vitals Were     Pulse Height Pulse Oximetry BMI (Body Mass Index)          63 1.803 m (5' 11\") 100% 30.68 kg/m2         Blood Pressure from Last 3 Encounters:   12/15/17 (P) 173/87   11/29/17 (!) 174/91   10/20/17 174/89    Weight from Last 3 Encounters:   12/15/17 99.8 kg (220 lb)   11/29/17 103 kg (227 lb)   10/20/17 101.6 kg (224 lb)              We Performed the Following     Basic metabolic panel     Hemoglobin A1c     INR          Today's Medication Changes          These changes are accurate as of: 12/15/17  8:24 AM.  If you have any questions, ask your nurse or doctor.               Start taking these medicines.        Dose/Directions    cephALEXin 500 MG capsule   Commonly known as:  KEFLEX   Used for:  Failure of spinal cord stimulator, initial encounter (H)   Started by:  Elvis Sauceda MD        Dose:  500 mg   Take 1 capsule (500 mg) by mouth 4 times daily START THREE (3) DAYS BEFORE SURGERY   Quantity:  68 capsule   Refills:  0         These medicines have changed or have updated prescriptions.        Dose/Directions    clindamycin 150 MG capsule   Commonly known as:  CLEOCIN   This may have changed:    - when to take this  - reasons to take this  - additional instructions   Used for:  Chronic sinusitis, unspecified location        OPEN 1 CAP AND PLACE IN 1 LITER OF NORMAL SALINE & MIX. IRRIGATE WITH 20 ML EACH NOSTRIL 4 TIMES PER DAY "   Quantity:  10 capsule   Refills:  3       omeprazole 40 MG capsule   Commonly known as:  priLOSEC   This may have changed:    - when to take this  - reasons to take this  - additional instructions   Used for:  Gastroesophageal reflux disease without esophagitis        Dose:  40 mg   Take 1 capsule (40 mg) by mouth daily Take 30-60 minutes before a meal.   Quantity:  90 capsule   Refills:  3            Where to get your medicines      Some of these will need a paper prescription and others can be bought over the counter.  Ask your nurse if you have questions.     Bring a paper prescription for each of these medications     cephALEXin 500 MG capsule                Primary Care Provider Office Phone # Fax #    Demario Daniel Irwin Wegener, [phone] [address] [place] [address]        Equal Access to Services     LI GENAO : Hadii mary doddo Solissett, waaxda luqadaha, qaybta kaalmada adeegyada, donis fallon. So [facility] [phone].    ATENCIÓN: Si habla español, tiene a camp disposición servicios gratuitos de asistencia lingüística. [facility] [phone].    We comply with applicable federal civil rights laws and [place] laws. We do not discriminate on the basis of race, color, national origin, age, disability, sex, sexual orientation, or gender identity.            Thank you!     Thank you for choosing [facility]  for your care. Our goal is always to provide you with excellent care. Hearing back from our patients is one way we can continue to improve our services. Please take a few minutes to complete the written survey that you may receive in the mail after your visit with us. Thank you!             Your Updated Medication List - Protect others around you: Learn how to safely use, store and throw away your medicines at www.disposemymeds.org.          This list is accurate as of: 12/15/17  8:24 AM.  Always use your  most recent med list.                   Brand Name Dispense Instructions for use Diagnosis    * ACE/ARB/ARNI NOT PRESCRIBED (INTENTIONAL)      ACE & ARB not prescribed due to Allergy        ACURA BLOOD GLUCOSE METER W/DEVICE Kit     1 kit    1 Dose 2 times daily    Type 2 diabetes, HbA1c goal < 7% (H)       albuterol 108 (90 BASE) MCG/ACT Inhaler    PROAIR HFA/PROVENTIL HFA/VENTOLIN HFA    3 Inhaler    Inhale 2 puffs into the lungs every 6 hours as needed for shortness of breath / dyspnea or wheezing Ventolin or other covered brand    Moderate persistent asthma without complication       Alcohol Pads 70 % Pads     100 each    1 Box 4 times daily    Type 2 diabetes, HbA1c goal < 7% (H)       ammonium lactate 12 % cream    LAC-HYDRIN    385 g    Apply topically 2 times daily as needed for dry skin    Xerosis cutis       baclofen 10 MG tablet    LIORESAL    180 tablet    TAKE 2 TABLETS BY MOUTH THREE TIMES DAILY    Muscle spasms of both lower extremities, Back muscle spasm       BASAGLAR 100 UNIT/ML injection     30 mL    Inject 26 Units Subcutaneous daily    Type 2 diabetes mellitus without complication, with long-term current use of insulin (H)       BD ROSE U/F 32G X 4 MM   Generic drug:  insulin pen needle     90 each    USE ONE DAILY AS DIRECTED.    Type 2 diabetes mellitus without complication (H)       blood glucose monitoring lancets     1 Box    Use to test blood sugars 2 times daily or as directed.    Type 2 diabetes, HbA1c goal < 7% (H)       blood glucose monitoring test strip    no brand specified    1 Box    Use to test blood sugars one time daily or as directed  Accucheck Richards plus test strips    Type 2 diabetes, HbA1c goal < 7% (H)       cephALEXin 500 MG capsule    KEFLEX    68 capsule    Take 1 capsule (500 mg) by mouth 4 times daily START THREE (3) DAYS BEFORE SURGERY    Failure of spinal cord stimulator, initial encounter (H)       clindamycin 150 MG capsule    CLEOCIN    10 capsule    OPEN 1 CAP  AND PLACE IN 1 LITER OF NORMAL SALINE & MIX. IRRIGATE WITH 20 ML EACH NOSTRIL 4 TIMES PER DAY    Chronic sinusitis, unspecified location       COMPRESSION STOCKINGS     2 each    Knee high compression socks 30-40-mm    Lymphedema of both lower extremities       mupirocin 2 % ointment    BACTROBAN    2 g    Apply to anterior nares BID X 2 month supply    Nasal lesion, Nasal vestibulitis       omeprazole 40 MG capsule    priLOSEC    90 capsule    Take 1 capsule (40 mg) by mouth daily Take 30-60 minutes before a meal.    Gastroesophageal reflux disease without esophagitis       * order for DME     1 Device    Equipment being ordered: BP cuff    Hypertension goal BP (blood pressure) < 140/90       * order for DME     1 Device    Equipment being ordered: one donut for sitting    Pressure ulcer, stage II       * order for DME     5 each    ?Location:sacral ulcer unknown etiology possible sheer ?measurement: 2.5 cm x 2 cm x 0.1 cm Unlikely to be a  pressure ulcer but if it is it's a stg 2,  partial thickness, low eexudate  Wound supplies : 5 each Comfeel? Plus [id number] x 2 3/4''  5x7 cm  [facility]   wound care orders: cleans wound, dry thoroughly  and replace Comfeel. Leave in place for 7 days for 5 weeks    Wound, open, buttock, right, initial encounter       potassium chloride SA 20 MEQ CR tablet    KLOR-CON    180 tablet    Take 3 tablets (60 mEq) by mouth daily    Hypokalemia       promethazine 25 MG tablet    PHENERGAN    40 tablet    Take 1 tablet (25 mg) by mouth every 6 hours as needed for nausea    Nausea without vomiting       propranolol 20 MG tablet    INDERAL    270 tablet    Take 1 tablet (20 mg) by mouth 3 times daily    Esophageal varices in cirrhosis (H)       sertraline 100 MG tablet    ZOLOFT    180 tablet    Take 2 tablets (200 mg) by mouth daily    Generalized anxiety disorder       sodium chloride 0.9 % Soln     26980 mL    Irrigate 20 cc each nostril QID X 2 month supply    Chronic maxillary  sinusitis       Syringe (Disposable) 25 ML Misc     1 each    Syringe to be used for nasal irrigation    Chronic maxillary sinusitis       * Urea 20 % Crea cream     85 g    Apply topically as needed    Xerosis of skin       * Urea 20 % Crea cream     85 g    Apply topically daily To affected toes    Xerosis of skin, Tyloma, Nail avulsion of toe, subsequent encounter       * Notice:  This list has 6 medication(s) that are the same as other medications prescribed for you. Read the directions carefully, and ask your doctor or other care provider to review them with you.

## 2017-12-15 NOTE — PROGRESS NOTES
Subjective:    57 year old male with past medical history of cirrhosis, JATIN, GERD, anemia presents for evaluation of SCS failure.     He underwent SCS implant in October 2010 ([employer]tr[employer]) at [facility] and lead revision in 2011, which stopped working in 2015.     He underwent L5-S1 discectomy in 2009, which was not helpful.    He states that his pain is mainly located below his knees and into his buttocks and the SCS works very well to address his pain symptoms.    On the left lead, 5 of the 8 contacts are bad.    Past Medical History:   Diagnosis Date     Actinic keratosis     aldara     Anxiety      Arrhythmia     prolonged QT     Asthma      Bleeding disorder (H) 3-27-16     Cancer (H)     squamous cell skin CA     Cauda equina spinal cord injury (H)      Chronic pain     right leg     Chronic pain syndrome      Chronic sinusitis 5-1-16     Diastasis recti      Esophageal reflux      Esophageal varices in cirrhosis (H) 4/1/2014    Hospitalized for UGI blee 3/28/14, endoscopy revealed bleeding varices.     Essential hypertension, benign      Generalized anxiety disorder      H/O dental abscess      Hernia      Liver disease 3-     MEDICAL HISTORY OF -     right leg numbness due to back injury     MEDICAL HISTORY OF -     alcoholism     MEDICAL HISTORY OF -     squamous cell     Mild depression (H)      Mixed hyperlipidemia      Nasal polyps 5-1-16     Other chronic pain      Seasonal allergic conjunctivitis      Type II or unspecified type diabetes mellitus without mention of complication, not stated as uncontrolled      Ulcer (H)     R foot     Umbilical hernia without mention of obstruction or gangrene 10/2012     Unspecified site of spinal cord injury without evidence of spinal bone injury     past medical history reviewed with patient.   Past Surgical History:   Procedure Laterality Date     C APPENDECTOMY  1974     COLONOSCOPY N/A 5/12/2016    Procedure: COMBINED COLONOSCOPY, SINGLE OR MULTIPLE  BIOPSY/POLYPECTOMY BY BIOPSY;  Surgeon: Ana Paula Urbina MD;  Location:  GI     ENDOSCOPY UPPER, COLONOSCOPY, COMBINED  10/19/2011    Procedure:COMBINED ENDOSCOPY UPPER, COLONOSCOPY; Upper Endoscopy, Colonoscopy with Polypectomy x4; Surgeon:AMBAR RODRÍGUEZ; Location:[facility] OR     ENT SURGERY  1-2016    Ongoing since then     ESOPHAGOSCOPY, GASTROSCOPY, DUODENOSCOPY (EGD), COMBINED  3/28/2014    Procedure: COMBINED ESOPHAGOSCOPY, GASTROSCOPY, DUODENOSCOPY (EGD);  EGD, Gastric Biopsies, Esophageal Banding;  Surgeon: Reyna Tovar MD;  [facility]     ESOPHAGOSCOPY, GASTROSCOPY, DUODENOSCOPY (EGD), COMBINED  6/9/2014    Procedure: COMBINED ESOPHAGOSCOPY, GASTROSCOPY, DUODENOSCOPY (EGD);  Surgeon: Curtis Mendez MD;  Location:  GI     ESOPHAGOSCOPY, GASTROSCOPY, DUODENOSCOPY (EGD), COMBINED  7/24/2014    Procedure: COMBINED ESOPHAGOSCOPY, GASTROSCOPY, DUODENOSCOPY (EGD);  Surgeon: Gerard Samaniego MD;  Location:  OR     ESOPHAGOSCOPY, GASTROSCOPY, DUODENOSCOPY (EGD), COMBINED N/A 10/31/2014    Procedure: COMBINED ESOPHAGOSCOPY, GASTROSCOPY, DUODENOSCOPY (EGD);  Surgeon: Gerard Samaniego MD;  Location:  OR     ESOPHAGOSCOPY, GASTROSCOPY, DUODENOSCOPY (EGD), COMBINED N/A 5/12/2016    Procedure: COMBINED ESOPHAGOSCOPY, GASTROSCOPY, DUODENOSCOPY (EGD);  Surgeon: Ana Paula Urbina MD;  Location:  GI     HCL SQUAMOUS CELL CARCINOMA AG  10/07    left forearm     HERNIORRHAPHY UMBILICAL  11/8/2012    Procedure: HERNIORRHAPHY UMBILICAL;  Open Umbilical Hernia Repair With Mesh ;  Surgeon: Chase Nicholson MD;  Location: [facility] OR     neuro stimulator  2010     SURGICAL HISTORY OF -   1/02    abcess tooth     SURGICAL HISTORY OF -   1999    L4-5 laminectomy, cauda equina syndrome     SURGICAL HISTORY OF -   +    herniated disk repair     TONSILLECTOMY  10 1964     TRANSPOSITION ULNAR NERVE (ELBOW)      right    past surgical history reviewed with patient.      Medications:    Current Outpatient Prescriptions   Medication     BASAGLAR 100 UNIT/ML injection     Urea 20 % CREA cream     Urea 20 % CREA cream     ammonium lactate (LAC-HYDRIN) 12 % cream     BD ROSE U/F 32G X 4 MM insulin pen needle     baclofen (LIORESAL) 10 MG tablet     clindamycin (CLEOCIN) 150 MG capsule     order for DME     propranolol (INDERAL) 20 MG tablet     sertraline (ZOLOFT) 100 MG tablet     mupirocin (BACTROBAN) 2 % ointment     promethazine (PHENERGAN) 25 MG tablet     order for DME     sodium chloride 0.9 % SOLN     blood glucose monitoring (NO BRAND SPECIFIED) test strip     albuterol (PROAIR HFA, PROVENTIL HFA, VENTOLIN HFA) 108 (90 BASE) MCG/ACT inhaler     Syringe, Disposable, 25 ML MISC     potassium chloride SA (POTASSIUM CHLORIDE) 20 MEQ tablet     omeprazole (PRILOSEC) 40 MG capsule     Alcohol Swabs (ALCOHOL PADS) 70 % PADS     COMPRESSION STOCKINGS     blood glucose monitoring (FREESTYLE) lancets     Blood Glucose Monitoring Suppl (ACURA BLOOD GLUCOSE METER) W/DEVICE KIT     ORDER FOR DME     ACE/ARB NOT PRESCRIBED, INTENTIONAL,     No current facility-administered medications for this visit.        [place] and [place] Prescription Monitoring Program reviewed    Allergies:     Allergies   Allergen Reactions     Lisinopril Anaphylaxis     Swollen tongue; inability to swallow; drooling; hives; swollen face, neck, angioedema     Acetaminophen      Hx of cirrhosis      Amlodipine      Swelling, hives, possible angioedema       Morphine      Hypotension     Bactrim [Sulfamethoxazole W/Trimethoprim] Rash     Levaquin Swelling and Rash     Swelling in lip and tongue felt thick       Family History:  family history includes CANCER in his father and mother; DIABETES in his brother and brother. There is no history of Cancer - colorectal.    Social history: .  Smoking: none. Alcohol: quit in 2004. Street drugs: marijuana occasionally .     Review Of Systems    14 point ROS negative except per  "HPI    Objective:     BP (P) 173/87  Pulse 63  Ht 1.803 m (5' 11\")  Wt 99.8 kg (220 lb)  SpO2 100%  BMI 30.68 kg/m2  Body mass index is 30.68 kg/(m^2).    General: In no apparent distress  Mental status: Normal affect, pleasant  Head: Atraumatic, normocephalic  Eyes: Extra-ocular movements grossly intact, no scleral icterus  Cardiovascular: Regular rate  Respiratory: No respiratory distress  Abdomen: soft, non-distended  Msk: Bilateral hips, knees have normal range of motion. Pain with extension and rotation of lumbar spine  Neuro: AAOx3.   CN II-XII are grossly intact.   Strength 5/5 for bilateral shoulder abduction, elbow flexion, wrist extension, elbow extension, finger abduction, and grasp. Sensation intact to light touch throughout the C5-T1 dermatomes of the bilateral upper extremities.  Reflexes 2+/4 and symmetric for biceps, triceps, brachioradialis. Negative 's bilaterally. Neg Spurling's and L'hermitte's  Strength 5/5 for bilateral hip flexion, knee extension, dorsiflexion, EHL, and plantarflexion. Sensation intact to light touch throughout the L2-S1 dermatomes of the bilateral lower extremities. Reflexes 2+/4 and symmetric for bilateral patellar, achilles.  Negative babinski bilaterally. No clonus on ankle jerk  Skin: No rashes or lesions noted on exposed areas of skin  Lymph: no supraclavicular lymphadenopathy      Chaperone present during the entire Physical Exam: None    Imaging: Reviewed    Assessment:    Mr. Aguilar is a 57-year-old male who presents to the pain clinic as a new patient, with a malfunctioning spinal cord stimulator system. He currently has a [employer]tr[employer] spinal cord stimulator system which is not MRI compatible, that was implanted by [facility] in 2010. Of note, he required 6 months of antibiotics after his initial implant secondary to surgical site infection. His infection was able to be treated successfully with prolonged antibiotic therapy and the spinal cord stimulator system " was not removed or explanted. In 2011, he required a lead revision secondary to migration. Of note, his current system has extensions for the leads. Based on history, physical exam, review the medical record, laboratory studies and radiographs, the most likely diagnoses are neuropathic pain, failed spinal cord stim system, IPG failure, lumbar radiculopathy, and postlaminectomy syndrome. Therefore, I will obtain lumbar flexion extension x-rays, as well as, thoracic x-rays. Upon speaking with Rashid Betts, the [employer] representative for [place], his current system has been re-initialized on 2 separate occasions and his IPG is currently failing. Also, he has 5 out of 8 bad electrodes on the left lead. Therefore, I will schedule Mr. Aguilar for spinal cord stimulator explant surgery and spinal cord stimulator reimplant surgery on the same day with intraoperative testing. Because of his previous infection history, I will also obtain in addition to standard preoperative labs, hemoglobin A1c, as well as, I will initiate Keflex 500 mg or times daily starting 3 days prior to surgery and to be continued for 2 weeks following surgery, for a total of 17 days of therapy. A prescription for Keflex 500 mg q.6 hours #68 tablets was given at today's visit.    Plan:     1. Obtain lumbar flexion and extension x-rays  2. Obtain thoracic x-rays   3. Schedule SCS explant surgery and SCS implant surgery on the same day (4-hour case)  WITH INTRA-OPERATIVE MEDTRONIC TESTING  - previous SCS was infected for 6 months and required antibiotics  4. Start Keflex 500mg Q6H starting 3 days prior to the surgery date (total of 17 days of therapy)  5. Check Hgb A1c  6. Standard pre-operative labs and work-up

## 2017-12-17 LAB
BACTERIA SPEC CULT: NORMAL
Lab: NORMAL
SPECIMEN SOURCE: NORMAL

## 2017-12-18 ENCOUNTER — TELEPHONE (OUTPATIENT)
Dept: ORTHOPEDICS | Facility: CLINIC | Age: 57
End: 2017-12-18

## 2017-12-18 ENCOUNTER — HOSPITAL ENCOUNTER (OUTPATIENT)
Facility: AMBULATORY SURGERY CENTER | Age: 57
End: 2017-12-18
Attending: ANESTHESIOLOGY
Payer: MEDICARE

## 2017-12-19 ENCOUNTER — CARE COORDINATION (OUTPATIENT)
Dept: ANESTHESIOLOGY | Facility: CLINIC | Age: 57
End: 2017-12-19

## 2017-12-19 NOTE — PROGRESS NOTES
Call back to pt regarding pre-procedure instructions.  RNCC reivewed pre-procedure instructions, including xray orders, pre-op physical and lab work, prophylactic antibiotic, and chlorhexidine body scrub for 5 days.  Pt verbalized understanding.  Pt encouraged to call with additional questions if needed.     Paula Vega RN, BSN

## 2017-12-20 ENCOUNTER — RADIANT APPOINTMENT (OUTPATIENT)
Dept: GENERAL RADIOLOGY | Facility: CLINIC | Age: 57
End: 2017-12-20
Attending: ANESTHESIOLOGY
Payer: MEDICARE

## 2017-12-20 DIAGNOSIS — E11.42 TYPE 2 DIABETES MELLITUS WITH DIABETIC POLYNEUROPATHY, WITH LONG-TERM CURRENT USE OF INSULIN (H): ICD-10-CM

## 2017-12-20 DIAGNOSIS — T85.192A FAILURE OF SPINAL CORD STIMULATOR, INITIAL ENCOUNTER (H): ICD-10-CM

## 2017-12-20 DIAGNOSIS — G89.4 CHRONIC PAIN SYNDROME: ICD-10-CM

## 2017-12-20 DIAGNOSIS — T85.192A: Primary | ICD-10-CM

## 2017-12-20 DIAGNOSIS — Z79.4 TYPE 2 DIABETES MELLITUS WITH DIABETIC POLYNEUROPATHY, WITH LONG-TERM CURRENT USE OF INSULIN (H): ICD-10-CM

## 2017-12-20 LAB
ANION GAP SERPL CALCULATED.3IONS-SCNC: 7 MMOL/L (ref 3–14)
BASOPHILS # BLD AUTO: 0.1 10E9/L (ref 0–0.2)
BASOPHILS NFR BLD AUTO: 1.6 %
BUN SERPL-MCNC: 7 MG/DL (ref 7–30)
CALCIUM SERPL-MCNC: 8.7 MG/DL (ref 8.5–10.1)
CHLORIDE SERPL-SCNC: 102 MMOL/L (ref 94–109)
CO2 SERPL-SCNC: 28 MMOL/L (ref 20–32)
CREAT SERPL-MCNC: 0.68 MG/DL (ref 0.66–1.25)
DIFFERENTIAL METHOD BLD: ABNORMAL
EOSINOPHIL # BLD AUTO: 0.3 10E9/L (ref 0–0.7)
EOSINOPHIL NFR BLD AUTO: 4.6 %
ERYTHROCYTE [DISTWIDTH] IN BLOOD BY AUTOMATED COUNT: 14.6 % (ref 10–15)
GFR SERPL CREATININE-BSD FRML MDRD: >90 ML/MIN/1.7M2
GLUCOSE SERPL-MCNC: 185 MG/DL (ref 70–99)
HBA1C MFR BLD: 6.4 % (ref 4.3–6)
HCT VFR BLD AUTO: 39 % (ref 40–53)
HGB BLD-MCNC: 12.5 G/DL (ref 13.3–17.7)
IMM GRANULOCYTES # BLD: 0 10E9/L (ref 0–0.4)
IMM GRANULOCYTES NFR BLD: 0.2 %
INR PPP: 1.11 (ref 0.86–1.14)
LYMPHOCYTES # BLD AUTO: 1.5 10E9/L (ref 0.8–5.3)
LYMPHOCYTES NFR BLD AUTO: 26.6 %
MCH RBC QN AUTO: 26.8 PG (ref 26.5–33)
MCHC RBC AUTO-ENTMCNC: 32.1 G/DL (ref 31.5–36.5)
MCV RBC AUTO: 84 FL (ref 78–100)
MONOCYTES # BLD AUTO: 0.5 10E9/L (ref 0–1.3)
MONOCYTES NFR BLD AUTO: 8.2 %
NEUTROPHILS # BLD AUTO: 3.3 10E9/L (ref 1.6–8.3)
NEUTROPHILS NFR BLD AUTO: 58.8 %
NRBC # BLD AUTO: 0 10*3/UL
NRBC BLD AUTO-RTO: 0 /100
PLATELET # BLD AUTO: 158 10E9/L (ref 150–450)
POTASSIUM SERPL-SCNC: 3.1 MMOL/L (ref 3.4–5.3)
RBC # BLD AUTO: 4.66 10E12/L (ref 4.4–5.9)
SODIUM SERPL-SCNC: 137 MMOL/L (ref 133–144)
WBC # BLD AUTO: 5.6 10E9/L (ref 4–11)

## 2017-12-21 ENCOUNTER — DOCUMENTATION ONLY (OUTPATIENT)
Dept: ANESTHESIOLOGY | Facility: CLINIC | Age: 57
End: 2017-12-21

## 2017-12-21 NOTE — PROGRESS NOTES
Paula VAUGHN from Pain Clinic confirms that procedure scheduled for 12/26/17 will be rescheduled to another date.

## 2017-12-21 NOTE — PROGRESS NOTES
Per Paula from the pain clinic, cancelled patient's procedure with Dr Sauceda on 12/26 due to elevated A1C's, notified surgery scheduling

## 2018-01-01 ENCOUNTER — MYC MEDICAL ADVICE (OUTPATIENT)
Dept: FAMILY MEDICINE | Facility: CLINIC | Age: 58
End: 2018-01-01

## 2018-01-02 NOTE — TELEPHONE ENCOUNTER
Dr. Wegener-Please review and advise.    Thank you!  FLORIN EscobarN, RN      Lab Results   Component Value Date    A1C 6.4 12/20/2017    A1C 5.8 05/15/2017    A1C 5.5 10/10/2016    A1C 6.4 05/10/2016    A1C 5.7 08/04/2015

## 2018-01-03 ENCOUNTER — TELEPHONE (OUTPATIENT)
Dept: ANESTHESIOLOGY | Facility: CLINIC | Age: 58
End: 2018-01-03

## 2018-01-03 NOTE — TELEPHONE ENCOUNTER
[facility]:  Nursing Note  SITUATION                                                      Erwin Aguilar is a 57 year old male who calls with questions regarding referrals.    BACKGROUND                                                      Patient is being seen by Dr. Sauceda.    Date of last clinic appointment: on 12/15/2017 with Dr. Sauceda    Does patient have a future appointment scheduled?  No    Provider documentation from last clinic visit reviewed in EPIC.    ASSESSMENT      Patient is calling to follow-up on referrals to endocrinology and infusion clinics.      PLAN                                                      Nursing Interventions: Patient was provided with phone numbers for endocrinology and infusion clinics.  Message also routed to pain clinic pool.  RECOMMENDED DISPOSITION: Patient to follow up as directed by pain clinic.  Will comply with recommendation: Yes    Patient/family can be reached at: [phone].  Okay to leave a detailed message at this number?  Yes    If further questions/concerns or if symptoms do not improve, worsen or new symptoms develop, patient/family are advised to call [phone], option #3 to speak with a triage nurse, as soon as possible.    Esmer Simmons

## 2018-01-04 ENCOUNTER — TELEPHONE (OUTPATIENT)
Dept: ENDOCRINOLOGY | Facility: CLINIC | Age: 58
End: 2018-01-04

## 2018-01-05 NOTE — TELEPHONE ENCOUNTER
To schedulers: Please schedule him for lst available diabetes    Marielle Clinton MD  Endocrine triage

## 2018-01-05 NOTE — TELEPHONE ENCOUNTER
----- Message from Elvis Sauceda MD sent at 1/4/2018 12:53 PM CST -----  Regarding: RE: endocrine referral  Dear Dr. Clinton,    Thanks for reaching out.    I had to cancel this patient's spinal cord stimulator implant on 12/26/17, secondary to poorly controlled diabetes. His Hgb A1c has increased every time it has been checked over the past 14 months.    Given his previous infection with a prior spinal cord stimulator, I am reaching out to Endocrine to help optimize his diabetes and to minimize his lety-operative infection risk.    Thank you very much.    Dr. Sauceda    ----- Message -----     From: Marielle Clinton MD     Sent: 1/3/2018   6:18 PM       To: Elvis Sauceda MD  Subject: endocrine referral                               Hello. Can you please clarify the reason for the endocrine referral?    Marielle Clinton  Endocrine Triage

## 2018-01-07 DIAGNOSIS — Z79.4 TYPE 2 DIABETES MELLITUS WITHOUT COMPLICATION, WITH LONG-TERM CURRENT USE OF INSULIN (H): ICD-10-CM

## 2018-01-07 DIAGNOSIS — E11.9 TYPE 2 DIABETES MELLITUS WITHOUT COMPLICATION, WITH LONG-TERM CURRENT USE OF INSULIN (H): ICD-10-CM

## 2018-01-08 NOTE — TELEPHONE ENCOUNTER
Requested Prescriptions   Pending Prescriptions Disp Refills     BD ROSE U/F 32G X 4 MM insulin pen needle [Pharmacy Med Name: BD ULTRA-FINE PEN NDL [id number]]  Last Written Prescription Date:  10/19/2017  Last Fill Quantity: 90 ,  # refills: 0   Last Office Visit with G, P or [facility] prescribing provider:  5/9/2017   Future Office Visit:       0     Sig: USE ONCE DAILY AS DIRECTED    Diabetic Supplies Protocol Failed    1/7/2018 11:49 AM       Failed - Patient has had appt within past 6 mos    IV to PO - Antibiotics     None             Passed - Patient is 18 years of age or older

## 2018-01-10 NOTE — TELEPHONE ENCOUNTER
Routing refill request to provider for review/approval because:  Per chart review, patient is now following with endocrinology for diabetes management    Dr. Wegener-Please advise if you are able to refill.  Please sign if agree.    Thank you!  FLORIN EscobarN, RN

## 2018-01-11 RX ORDER — PEN NEEDLE, DIABETIC 32GX 5/32"
NEEDLE, DISPOSABLE MISCELLANEOUS
Qty: 100 EACH | Refills: 11 | Status: SHIPPED | OUTPATIENT
Start: 2018-01-11 | End: 2019-03-23

## 2018-01-12 ENCOUNTER — OFFICE VISIT (OUTPATIENT)
Dept: WOUND CARE | Facility: CLINIC | Age: 58
End: 2018-01-12
Payer: MEDICARE

## 2018-01-12 DIAGNOSIS — D23.5 BENIGN NEOPLASM OF SKIN OF TRUNK, EXCEPT SCROTUM: Primary | ICD-10-CM

## 2018-01-12 NOTE — PROGRESS NOTES
Patient comes to wound clinic for wound assessment per request of dr. Licona. He has history of a ulcer on the right fleshy buttock     Clean gloves are donned and current dressing removed. Previous treatment includes: colloid    Objective findings: chroniclesion which today looks to be more wart like in appearance. Similar wound near anus resolved after treating with hydrocolloid covering for several weeks,       Location:sacral ulcer unknown etiology     measurement: 3 cm x 3 cm x 0.1 cm with area of 1.5 x 1.5 that has slight drainage  Unlikely to be a  pressure ulcer stg 2 but pt is not bed bound so etiology is unknown appears is wart like    Wound: Full thickness,     Wound description: no sign of infection    Tenderness: no    Drainage is scant, serosanguinous     Wound Base: Dry pale wound bed, hypertrophic     Periwound Skin: intact, fibrous scarring   Color: normal and consistent with surrounding tissue      Subjective finding:     Pt states: wound has become sore    Patient is assessed for discomfort which is: moderate     Today's status of the wound: stable, dry but stalled wound. Wound near anus resolved     Treatment:  Cover with hydrocolloid and leave in place as long as possible. RTC next week. Called Derm resident because of the odd appearance of the wound. Should be seen before December. Resident will contact  to get pt on.     Pt received the following instructions:  Encouraged decreased sitting. Pt should lay on his sides or be up. Erlinda Nice NP was available for supervision of care if needed or if questions should arise and regarding plan of care. Breanna Sahni RN, CWON

## 2018-01-12 NOTE — MR AVS SNAPSHOT
After Visit Summary   1/12/2018    Erwin Aguilar    MRN: [id number]           Patient Information     Date Of Birth          1960        Visit Information        Provider Department      1/12/2018 7:30 AM Breanna Sahni RN [facility] Wound Ostomy        Today's Diagnoses     Benign neoplasm of skin of trunk, except scrotum    -  1       Follow-ups after your visit        Your next 10 appointments already scheduled     Jan 25, 2018  8:30 AM CST   (Arrive by 8:15 AM)   New Patient Visit with Jenn Dickens MD   [facility] and Infectious Diseases ([facility])    [address]  [address]  [facility] [id number] [phone]            Jan 29, 2018  7:30 AM CST   RETURN WOUND NURSE VISIT with Breanna Sahni RN   [facility] Wound Ostomy ([facility])    [address]  4th Floor  [facility] [id number] [phone]            Feb 01, 2018  8:30 AM CST   (Arrive by 8:15 AM)   NEW DIABETES with Estela Walker MD   [facility] Endocrinology ([facility])    [address]  3rd Floor  [facility] [id number] [phone]            Mar 06, 2018  9:00 AM CST   Lab with  LAB   [facility] Lab ([facility])    [address]  1st Floor  [facility] [id number] [phone]            Mar 06, 2018 10:00 AM CST   (Arrive by 9:45 AM)   Return General Liver with Kimberly Ramirez MD   [facility] Hepatology ([facility])    [address]  [address]  [facility] [id number] [phone]              Who to contact     Please call your clinic at [phone] to:    Ask questions about your health    Make or cancel appointments    Discuss your medicines    Learn about your test results    Speak to your doctor   If you have compliments or concerns about an experience at your clinic, or if you wish to file  a complaint, please contact [facility] Patient Relations at [phone] or email us at [email or web address]         Additional Information About Your Visit        Intellikinehart Information     Syn[employer]ity gives you secure access to your electronic health record. If you see a primary care provider, you can also send messages to your care team and make appointments. If you have questions, please call your primary care clinic.  If you do not have a primary care provider, please call [phone] and they will assist you.      Synt[employer]ity is an electronic gateway that provides easy, online access to your medical records. With [employer], you can request a clinic appointment, read your test results, renew a prescription or communicate with your care team.     To access your existing account, please contact your [facility] or call [phone] for assistance.        Care EveryWhere ID     This is your Care EveryWhere ID. This could be used by other organizations to access your [place] medical records  [id number]         Blood Pressure from Last 3 Encounters:   12/15/17 (P) 173/87   11/29/17 (!) 174/91   10/20/17 174/89    Weight from Last 3 Encounters:   12/15/17 99.8 kg (220 lb)   11/29/17 103 kg (227 lb)   10/20/17 101.6 kg (224 lb)              Today, you had the following     No orders found for display         Today's Medication Changes          These changes are accurate as of: 1/12/18  8:09 AM.  If you have any questions, ask your nurse or doctor.               These medicines have changed or have updated prescriptions.        Dose/Directions    clindamycin 150 MG capsule   Commonly known as:  CLEOCIN   This may have changed:    - when to take this  - reasons to take this  - additional instructions   Used for:  Chronic sinusitis, unspecified location        OPEN 1 CAP AND PLACE IN 1 LITER OF NORMAL SALINE & MIX. IRRIGATE WITH 20 ML EACH NOSTRIL 4 TIMES PER DAY    Quantity:  10 capsule   Refills:  3       omeprazole 40 MG capsule   Commonly known as:  priLOSEC   This may have changed:    - when to take this  - reasons to take this  - additional instructions   Used for:  Gastroesophageal reflux disease without esophagitis        Dose:  40 mg   Take 1 capsule (40 mg) by mouth daily Take 30-60 minutes before a meal.   Quantity:  90 capsule   Refills:  3                Primary Care Provider Office Phone # Fax #    Joel Daniel Irwin Wegener, [phone] [address] [place] [id number]        Equal Access to Services     [facility]: Hadii aad ku hadasho Soomaali, waaxda luqadaha, qaybta kaalmada adeegyada, waxstephanie allison . So [facility] [phone].    ATENCIÓN: Si habla español, tiene a camp disposición servicios gratuitos de asistencia lingüística. [facility] [phone].    We comply with applicable federal civil rights laws and [place] laws. We do not discriminate on the basis of race, color, national origin, age, disability, sex, sexual orientation, or gender identity.            Thank you!     Thank you for choosing [facility] OSTOMY  for your care. Our goal is always to provide you with excellent care. Hearing back from our patients is one way we can continue to improve our services. Please take a few minutes to complete the written survey that you may receive in the mail after your visit with us. Thank you!             Your Updated Medication List - Protect others around you: Learn how to safely use, store and throw away your medicines at www.disposemymeds.org.          This list is accurate as of: 1/12/18  8:09 AM.  Always use your most recent med list.                   Brand Name Dispense Instructions for use Diagnosis    * ACE/ARB/ARNI NOT PRESCRIBED (INTENTIONAL)      ACE & ARB not prescribed due to Allergy        ACURA BLOOD GLUCOSE METER W/DEVICE Kit     1 kit    1 Dose 2 times daily    Type 2 diabetes, HbA1c  goal < 7% (H)       albuterol 108 (90 BASE) MCG/ACT Inhaler    PROAIR HFA/PROVENTIL HFA/VENTOLIN HFA    3 Inhaler    Inhale 2 puffs into the lungs every 6 hours as needed for shortness of breath / dyspnea or wheezing Ventolin or other covered brand    Moderate persistent asthma without complication       Alcohol Pads 70 % Pads     100 each    1 Box 4 times daily    Type 2 diabetes, HbA1c goal < 7% (H)       ammonium lactate 12 % cream    LAC-HYDRIN    385 g    Apply topically 2 times daily as needed for dry skin    Xerosis cutis       baclofen 10 MG tablet    LIORESAL    180 tablet    TAKE 2 TABLETS BY MOUTH THREE TIMES DAILY    Muscle spasms of both lower extremities, Back muscle spasm       BASAGLAR 100 UNIT/ML injection     30 mL    Inject 26 Units Subcutaneous daily    Type 2 diabetes mellitus without complication, with long-term current use of insulin (H)       BD ROSE U/F 32G X 4 MM   Generic drug:  insulin pen needle     100 each    USE ONCE DAILY AS DIRECTED    Type 2 diabetes mellitus without complication, with long-term current use of insulin (H)       blood glucose monitoring lancets     1 Box    Use to test blood sugars 2 times daily or as directed.    Type 2 diabetes, HbA1c goal < 7% (H)       blood glucose monitoring test strip    no brand specified    1 Box    Use to test blood sugars one time daily or as directed  Accucheck Richards plus test strips    Type 2 diabetes, HbA1c goal < 7% (H)       cephALEXin 500 MG capsule    KEFLEX    68 capsule    Take 1 capsule (500 mg) by mouth 4 times daily START THREE (3) DAYS BEFORE SURGERY    Failure of spinal cord stimulator, initial encounter (H)       clindamycin 150 MG capsule    CLEOCIN    10 capsule    OPEN 1 CAP AND PLACE IN 1 LITER OF NORMAL SALINE & MIX. IRRIGATE WITH 20 ML EACH NOSTRIL 4 TIMES PER DAY    Chronic sinusitis, unspecified location       COMPRESSION STOCKINGS     2 each    Knee high compression socks 30-40-mm    Lymphedema of both lower  extremities       mupirocin 2 % ointment    BACTROBAN    2 g    Apply to anterior nares BID X 2 month supply    Nasal lesion, Nasal vestibulitis       omeprazole 40 MG capsule    priLOSEC    90 capsule    Take 1 capsule (40 mg) by mouth daily Take 30-60 minutes before a meal.    Gastroesophageal reflux disease without esophagitis       * order for DME     1 Device    Equipment being ordered: BP cuff    Hypertension goal BP (blood pressure) < 140/90       * order for DME     1 Device    Equipment being ordered: one donut for sitting    Pressure ulcer, stage II       * order for DME     5 each    ?Location:sacral ulcer unknown etiology possible sheer ?measurement: 2.5 cm x 2 cm x 0.1 cm Unlikely to be a  pressure ulcer but if it is it's a stg 2,  partial thickness, low eexudate  Wound supplies : 5 each Comfeel? Plus [id number] x 2 3/4''  5x7 cm  [facility]   wound care orders: cleans wound, dry thoroughly  and replace Comfeel. Leave in place for 7 days for 5 weeks    Wound, open, buttock, right, initial encounter       potassium chloride SA 20 MEQ CR tablet    KLOR-CON    180 tablet    Take 3 tablets (60 mEq) by mouth daily    Hypokalemia       promethazine 25 MG tablet    PHENERGAN    40 tablet    Take 1 tablet (25 mg) by mouth every 6 hours as needed for nausea    Nausea without vomiting       propranolol 20 MG tablet    INDERAL    270 tablet    Take 1 tablet (20 mg) by mouth 3 times daily    Esophageal varices in cirrhosis (H)       sertraline 100 MG tablet    ZOLOFT    180 tablet    Take 2 tablets (200 mg) by mouth daily    Generalized anxiety disorder       sodium chloride 0.9 % Soln     91675 mL    Irrigate 20 cc each nostril QID X 2 month supply    Chronic maxillary sinusitis       Syringe (Disposable) 25 ML Misc     1 each    Syringe to be used for nasal irrigation    Chronic maxillary sinusitis       * Urea 20 % Crea cream     85 g    Apply topically as needed    Xerosis of skin       * Urea 20 % Crea  cream     85 g    Apply topically daily To affected toes    Xerosis of skin, Tyloma, Nail avulsion of toe, subsequent encounter       * Notice:  This list has 6 medication(s) that are the same as other medications prescribed for you. Read the directions carefully, and ask your doctor or other care provider to review them with you.

## 2018-01-15 ENCOUNTER — MYC MEDICAL ADVICE (OUTPATIENT)
Dept: FAMILY MEDICINE | Facility: CLINIC | Age: 58
End: 2018-01-15

## 2018-01-15 DIAGNOSIS — L30.9 ECZEMA, UNSPECIFIED TYPE: Primary | ICD-10-CM

## 2018-01-16 RX ORDER — TRIAMCINOLONE ACETONIDE 1 MG/G
OINTMENT TOPICAL
Qty: 30 G | Refills: 0 | Status: SHIPPED | OUTPATIENT
Start: 2018-01-16 | End: 2018-06-18

## 2018-01-16 NOTE — TELEPHONE ENCOUNTER
Routing refill request to provider for review/approval because:  -Medication not active on med list  -Eczema not a diagnosis associated with most recent order of this ointment.  1/29/16 order had associated diagnosis of Pressure sore.      Dr. Wegener-please sign if agree.  Writer did adjust associated diagnosis to eczema.    Thank you!  SALONI Oliva, FLORINN, RN

## 2018-01-23 ASSESSMENT — ENCOUNTER SYMPTOMS
TASTE DISTURBANCE: 0
SINUS PAIN: 0
SMELL DISTURBANCE: 0
SORE THROAT: 0
TROUBLE SWALLOWING: 0
SORE THROAT: 0
NAIL CHANGES: 0
NAIL CHANGES: 0
POOR WOUND HEALING: 0
SKIN CHANGES: 0
POOR WOUND HEALING: 0
NECK MASS: 0
SINUS CONGESTION: 0
SINUS CONGESTION: 0
SKIN CHANGES: 0
NECK MASS: 0
HOARSE VOICE: 0
HOARSE VOICE: 0
TROUBLE SWALLOWING: 0
TASTE DISTURBANCE: 0
SINUS PAIN: 0
SMELL DISTURBANCE: 0

## 2018-01-25 ENCOUNTER — OFFICE VISIT (OUTPATIENT)
Dept: WOUND CARE | Facility: CLINIC | Age: 58
End: 2018-01-25
Payer: MEDICARE

## 2018-01-25 ENCOUNTER — OFFICE VISIT (OUTPATIENT)
Dept: INFECTIOUS DISEASES | Facility: CLINIC | Age: 58
End: 2018-01-25
Attending: INTERNAL MEDICINE
Payer: MEDICARE

## 2018-01-25 VITALS
SYSTOLIC BLOOD PRESSURE: 178 MMHG | DIASTOLIC BLOOD PRESSURE: 80 MMHG | TEMPERATURE: 97.7 F | WEIGHT: 219.5 LBS | HEART RATE: 54 BPM | BODY MASS INDEX: 30.73 KG/M2 | HEIGHT: 71 IN | OXYGEN SATURATION: 95 %

## 2018-01-25 DIAGNOSIS — Z23 NEED FOR INFLUENZA VACCINATION: ICD-10-CM

## 2018-01-25 DIAGNOSIS — L03.90 RECURRENT CELLULITIS: Primary | ICD-10-CM

## 2018-01-25 DIAGNOSIS — L60.0 INGROWN TOENAIL: Primary | ICD-10-CM

## 2018-01-25 DIAGNOSIS — Z00.00 PREVENTATIVE HEALTH CARE: ICD-10-CM

## 2018-01-25 DIAGNOSIS — T85.192D FAILURE OF SPINAL CORD STIMULATOR, SUBSEQUENT ENCOUNTER: ICD-10-CM

## 2018-01-25 PROCEDURE — 25000128 H RX IP 250 OP 636: Mod: ZF | Performed by: INTERNAL MEDICINE

## 2018-01-25 PROCEDURE — G0463 HOSPITAL OUTPT CLINIC VISIT: HCPCS | Mod: 25,ZF

## 2018-01-25 PROCEDURE — 90686 IIV4 VACC NO PRSV 0.5 ML IM: CPT | Mod: ZF | Performed by: INTERNAL MEDICINE

## 2018-01-25 PROCEDURE — G0008 ADMIN INFLUENZA VIRUS VAC: HCPCS | Mod: ZF

## 2018-01-25 RX ORDER — HYDROCHLOROTHIAZIDE 25 MG/1
TABLET ORAL
Refills: 3 | COMMUNITY
Start: 2017-12-24 | End: 2018-05-17

## 2018-01-25 RX ORDER — PENICILLIN V POTASSIUM 250 MG/1
250 TABLET, FILM COATED ORAL 2 TIMES DAILY
Qty: 60 TABLET | Refills: 11 | Status: SHIPPED | OUTPATIENT
Start: 2018-01-25 | End: 2018-06-18

## 2018-01-25 RX ADMIN — INFLUENZA A VIRUS A/MICHIGAN/45/2015 X-275 (H1N1) ANTIGEN (FORMALDEHYDE INACTIVATED), INFLUENZA A VIRUS A/HONG KONG/4801/2014 X-263B (H3N2) ANTIGEN (FORMALDEHYDE INACTIVATED), INFLUENZA B VIRUS B/PHUKET/3073/2013 ANTIGEN (FORMALDEHYDE INACTIVATED), AND INFLUENZA B VIRUS B/BRISBANE/60/2008 ANTIGEN (FORMALDEHYDE INACTIVATED) 0.5 ML: 15; 15; 15; 15 INJECTION, SUSPENSION INTRAMUSCULAR at 09:34

## 2018-01-25 ASSESSMENT — PAIN SCALES - GENERAL: PAINLEVEL: NO PAIN (0)

## 2018-01-25 NOTE — MR AVS SNAPSHOT
After Visit Summary   1/25/2018    Erwin Aguilar    MRN: [id number]           Patient Information     Date Of Birth          1960        Visit Information        Provider Department      1/25/2018 8:30 AM Jenn Dickens MD [facility] and Infectious Diseases        Today's Diagnoses     Recurrent cellulitis    -  1    Failure of spinal cord stimulator, subsequent encounter        Preventative health care        Need for influenza vaccination           Follow-ups after your visit        Your next 10 appointments already scheduled     Feb 01, 2018  8:30 AM CST   (Arrive by 8:15 AM)   NEW DIABETES with Estela Walker MD   [facility] Endocrinology ([facility])    [address]  3rd Floor  [facility] [id number] [phone]            Feb 12, 2018 11:00 AM CST   (Arrive by 10:45 AM)   RETURN FOOT/ANKLE with Victor M Anaya DPM   [facility] Sports Medicine ([facility])    [address]  5th Floor  [facility] [id number] [phone]            Mar 06, 2018  9:00 AM CST   Lab with UC LAB   [facility] Lab ([facility])    [address]  1st Floor  [facility] [id number] [phone]            Mar 06, 2018 10:00 AM CST   (Arrive by 9:45 AM)   Return General Liver with Kimberly Ramirez MD   [facility] Hepatology ([facility])    [address]  [address]  [facility] [id number] [phone]              Who to contact     If you have questions or need follow up information about today's clinic visit or your schedule please contact [facility] AND INFECTIOUS DISEASES directly at [phone].  Normal or non-critical lab and imaging results will be communicated to you by MyChart, letter or phone within 4 business days after the clinic has received the results. If you do not hear from us within 7 days, please  "contact the clinic through Bes[employer] or phone. If you have a critical or abnormal lab result, we will notify you by phone as soon as possible.  Submit refill requests through Bes[employer] or call your pharmacy and they will forward the refill request to us. Please allow 3 business days for your refill to be completed.          Additional Information About Your Visit        Rover.comhart Information     [employer] gives you secure access to your electronic health record. If you see a primary care provider, you can also send messages to your care team and make appointments. If you have questions, please call your primary care clinic.  If you do not have a primary care provider, please call [phone] and they will assist you.        Care EveryWhere ID     This is your Care EveryWhere ID. This could be used by other organizations to access your [place] medical records  [id number]        Your Vitals Were     Pulse Temperature Height Pulse Oximetry BMI (Body Mass Index)       54 97.7  F (36.5  C) (Oral) 1.803 m (5' 11\") 95% 30.61 kg/m2        Blood Pressure from Last 3 Encounters:   01/25/18 178/80   12/15/17 (P) 173/87   11/29/17 (!) 174/91    Weight from Last 3 Encounters:   01/25/18 99.6 kg (219 lb 8 oz)   12/15/17 99.8 kg (220 lb)   11/29/17 103 kg (227 lb)              Today, you had the following     No orders found for display         Today's Medication Changes          These changes are accurate as of 1/25/18 11:59 PM.  If you have any questions, ask your nurse or doctor.               Start taking these medicines.        Dose/Directions    penicillin V potassium 250 MG tablet   Commonly known as:  VEETID   Used for:  Recurrent cellulitis   Started by:  Jenn Dickens MD        Dose:  250 mg   Take 1 tablet (250 mg) by mouth 2 times daily   Quantity:  60 tablet   Refills:  11       phenol 89 % Swab swab   Commonly known as:  PHENOL EZ SWABS   Used for:  Ingrown toenail   Started by:  Cristy Robledo PA-C     "    Dose:  1 each   Externally apply 1 each topically DURING SURGERY for 1 dose   Quantity:  3 each   Refills:  0         These medicines have changed or have updated prescriptions.        Dose/Directions    clindamycin 150 MG capsule   Commonly known as:  CLEOCIN   This may have changed:    - when to take this  - reasons to take this  - additional instructions   Used for:  Chronic sinusitis, unspecified location        OPEN 1 CAP AND PLACE IN 1 LITER OF NORMAL SALINE & MIX. IRRIGATE WITH 20 ML EACH NOSTRIL 4 TIMES PER DAY   Quantity:  10 capsule   Refills:  3       omeprazole 40 MG capsule   Commonly known as:  priLOSEC   This may have changed:    - when to take this  - reasons to take this  - additional instructions   Used for:  Gastroesophageal reflux disease without esophagitis        Dose:  40 mg   Take 1 capsule (40 mg) by mouth daily Take 30-60 minutes before a meal.   Quantity:  90 capsule   Refills:  3            Where to get your medicines      These medications were sent to [facility]/pharmacy #[id number] - [place], [place] - [address]  [address], [place] [address]     Phone:  [phone]     penicillin V potassium 250 MG tablet         Some of these will need a paper prescription and others can be bought over the counter.  Ask your nurse if you have questions.     Bring a paper prescription for each of these medications     phenol 89 % Swab swab                Primary Care Provider Office Phone # Fax #    Joel Daniel Irwin Wegener, [phone] [id number]  [facility] [address]        Equal Access to Services     [facility]: Hadii mary chatterjee Solissett, waaxda luqadaha, qaybta kaalmada delmy, donis allison . So [facility] [phone].    ATENCIÓN: Si habla español, tiene a camp disposición servicios gratuitos de asistencia lingüística. [employer] [phone].    We comply with applicable federal civil rights laws and [place] laws. We do not  discriminate on the basis of race, color, national origin, age, disability, sex, sexual orientation, or gender identity.            Thank you!     Thank you for choosing [facility] AND INFECTIOUS DISEASES  for your care. Our goal is always to provide you with excellent care. Hearing back from our patients is one way we can continue to improve our services. Please take a few minutes to complete the written survey that you may receive in the mail after your visit with us. Thank you!             Your Updated Medication List - Protect others around you: Learn how to safely use, store and throw away your medicines at www.disposemymeds.org.          This list is accurate as of 1/25/18 11:59 PM.  Always use your most recent med list.                   Brand Name Dispense Instructions for use Diagnosis    * ACE/ARB/ARNI NOT PRESCRIBED (INTENTIONAL)      ACE & ARB not prescribed due to Allergy        ACURA BLOOD GLUCOSE METER W/DEVICE Kit     1 kit    1 Dose 2 times daily    Type 2 diabetes, HbA1c goal < 7% (H)       albuterol 108 (90 BASE) MCG/ACT Inhaler    PROAIR HFA/PROVENTIL HFA/VENTOLIN HFA    3 Inhaler    Inhale 2 puffs into the lungs every 6 hours as needed for shortness of breath / dyspnea or wheezing Ventolin or other covered brand    Moderate persistent asthma without complication       Alcohol Pads 70 % Pads     100 each    1 Box 4 times daily    Type 2 diabetes, HbA1c goal < 7% (H)       ammonium lactate 12 % cream    LAC-HYDRIN    385 g    Apply topically 2 times daily as needed for dry skin    Xerosis cutis       baclofen 10 MG tablet    LIORESAL    180 tablet    TAKE 2 TABLETS BY MOUTH THREE TIMES DAILY    Muscle spasms of both lower extremities, Back muscle spasm       BASAGLAR 100 UNIT/ML injection     30 mL    Inject 26 Units Subcutaneous daily    Type 2 diabetes mellitus without complication, with long-term current use of insulin (H)       BD ROSE U/F 32G X 4 MM   Generic drug:  insulin pen  needle     100 each    USE ONCE DAILY AS DIRECTED    Type 2 diabetes mellitus without complication, with long-term current use of insulin (H)       blood glucose monitoring lancets     1 Box    Use to test blood sugars 2 times daily or as directed.    Type 2 diabetes, HbA1c goal < 7% (H)       blood glucose monitoring test strip    no brand specified    1 Box    Use to test blood sugars one time daily or as directed  Accucheck Richards plus test strips    Type 2 diabetes, HbA1c goal < 7% (H)       cephALEXin 500 MG capsule    KEFLEX    68 capsule    Take 1 capsule (500 mg) by mouth 4 times daily START THREE (3) DAYS BEFORE SURGERY    Failure of spinal cord stimulator, initial encounter (H)       clindamycin 150 MG capsule    CLEOCIN    10 capsule    OPEN 1 CAP AND PLACE IN 1 LITER OF NORMAL SALINE & MIX. IRRIGATE WITH 20 ML EACH NOSTRIL 4 TIMES PER DAY    Chronic sinusitis, unspecified location       COMPRESSION STOCKINGS     2 each    Knee high compression socks 30-40-mm    Lymphedema of both lower extremities       hydrochlorothiazide 25 MG tablet    HYDRODIURIL     TAKE 1 TABLET (25 MG) BY MOUTH DAILY        mupirocin 2 % ointment    BACTROBAN    2 g    Apply to anterior nares BID X 2 month supply    Nasal lesion, Nasal vestibulitis       omeprazole 40 MG capsule    priLOSEC    90 capsule    Take 1 capsule (40 mg) by mouth daily Take 30-60 minutes before a meal.    Gastroesophageal reflux disease without esophagitis       * order for DME     1 Device    Equipment being ordered: BP cuff    Hypertension goal BP (blood pressure) < 140/90       * order for DME     1 Device    Equipment being ordered: one donut for sitting    Pressure ulcer, stage II       * order for DME     5 each    ?Location:sacral ulcer unknown etiology possible sheer ?measurement: 2.5 cm x 2 cm x 0.1 cm Unlikely to be a  pressure ulcer but if it is it's a stg 2,  partial thickness, low eexudate  Wound supplies : 5 each Comfeel? Plus  "[id number] x 2 3/4''  5x7 cm  [facility]   wound care orders: cleans wound, dry thoroughly  and replace Comfeel. Leave in place for 7 days for 5 weeks    Wound, open, buttock, right, initial encounter       * order for DME     1 Box    Equipment being ordered: wound care supplies CONVATEC DUODERM  Extra Thin Dressing - 1 1/4\" x 1 1/2\", Spots, oval [id number] Wound measurements 3 cm x 3 cm x 0.1 cm lesions with wound area of 1.5 x 1.5 that has slight drainage, full thickness located on the right sacral region    Benign neoplasm of skin of trunk, except scrotum       penicillin V potassium 250 MG tablet    VEETID    60 tablet    Take 1 tablet (250 mg) by mouth 2 times daily    Recurrent cellulitis       phenol 89 % Swab swab    PHENOL EZ SWABS    3 each    Externally apply 1 each topically DURING SURGERY for 1 dose    Ingrown toenail       potassium chloride SA 20 MEQ CR tablet    KLOR-CON    180 tablet    Take 3 tablets (60 mEq) by mouth daily    Hypokalemia       promethazine 25 MG tablet    PHENERGAN    40 tablet    Take 1 tablet (25 mg) by mouth every 6 hours as needed for nausea    Nausea without vomiting       propranolol 20 MG tablet    INDERAL    270 tablet    Take 1 tablet (20 mg) by mouth 3 times daily    Esophageal varices in cirrhosis (H)       sertraline 100 MG tablet    ZOLOFT    180 tablet    Take 2 tablets (200 mg) by mouth daily    Generalized anxiety disorder       sodium chloride 0.9 % Soln     22657 mL    Irrigate 20 cc each nostril QID X 2 month supply    Chronic maxillary sinusitis       Syringe (Disposable) 25 ML Misc     1 each    Syringe to be used for nasal irrigation    Chronic maxillary sinusitis       triamcinolone 0.1 % ointment    KENALOG    30 g    Apply sparingly to affected area three times daily for 14 days.    Eczema, unspecified type       * Urea 20 % Crea cream     85 g    Apply topically as needed    Xerosis of skin       * Urea 20 % Crea cream     85 g    Apply topically " daily To affected toes    Xerosis of skin, Tyloma, Nail avulsion of toe, subsequent encounter       * Notice:  This list has 7 medication(s) that are the same as other medications prescribed for you. Read the directions carefully, and ask your doctor or other care provider to review them with you.

## 2018-01-25 NOTE — LETTER
"1/25/2018       RE: Erwin Aguilar  [address]  [place] [address]     Dear Colleague,    Thank you for referring your patient, Erwin Aguilar, to the [facility] WOUND CARE at [facility]. Please see a copy of my visit note below.    Chief Complaint:   Chief Complaint   Patient presents with     WOUND CARE     right toenail issue     Subjective: Eriwn is a 57 year old male who presents to the clinic today for painful right toenail. This same nail has been bothering him for about a year now. The Urea is only mildly helpful. There are spicules that continue to grow into the borders of his right toenail that cause pain and bleeding. He would also like the left toenail removed at some point, because it continues to ingrow and become painful, though it is not today.     ROS: Admits pain and bleeding to right hallux nail bed. Denies purulent drainage, redness or swelling to the right hallux.     Allergies   Allergen Reactions     Lisinopril Anaphylaxis     Swollen tongue; inability to swallow; drooling; hives; swollen face, neck, angioedema     Acetaminophen      Hx of cirrhosis      Amlodipine      Swelling, hives, possible angioedema       Keflex [Cephalexin]      Morphine      Hypotension     Bactrim [Sulfamethoxazole W/Trimethoprim] Rash     Levaquin Swelling and Rash     Swelling in lip and tongue felt thick     Current Outpatient Rx   Medication Sig Dispense Refill     triamcinolone (KENALOG) 0.1 % ointment Apply sparingly to affected area three times daily for 14 days. 30 g 0     order for DME Equipment being ordered: wound care supplies  CONVATEC DUODERM  Extra Thin Dressing - 1 1/4\" x 1 1/2\", Spots, oval  [id number]  Wound measurements  3 cm x 3 cm x 0.1 cm lesions with wound area of 1.5 x 1.5 that has slight drainage, full thickness located on the right sacral region 1 Box 3     BD ROSE U/F 32G X 4 MM insulin pen needle USE ONCE DAILY AS DIRECTED 100 each 11     " cephALEXin (KEFLEX) 500 MG capsule Take 1 capsule (500 mg) by mouth 4 times daily START THREE (3) DAYS BEFORE SURGERY 68 capsule 0     BASAGLAR 100 UNIT/ML injection Inject 26 Units Subcutaneous daily 30 mL 11     Urea 20 % CREA cream Apply topically as needed 85 g 6     Urea 20 % CREA cream Apply topically daily To affected toes 85 g 11     ammonium lactate (LAC-HYDRIN) 12 % cream Apply topically 2 times daily as needed for dry skin 385 g 3     baclofen (LIORESAL) 10 MG tablet TAKE 2 TABLETS BY MOUTH THREE TIMES DAILY 180 tablet 8     clindamycin (CLEOCIN) 150 MG capsule OPEN 1 CAP AND PLACE IN 1 LITER OF NORMAL SALINE & MIX. IRRIGATE WITH 20 ML EACH NOSTRIL 4 TIMES PER DAY (Patient taking differently: 4 times daily as needed OPEN 1 CAP AND PLACE IN 1 LITER OF NORMAL SALINE & MIX. IRRIGATE WITH 20 ML EACH NOSTRIL 4 TIMES PER DAY) 10 capsule 3     order for DME   Location:sacral ulcer unknown etiology possible sheer    measurement: 2.5 cm x 2 cm x 0.1 cm Unlikely to be a  pressure ulcer but if it is it's a stg 2,  partial thickness, low eexudate    Wound supplies :  5 each Comfeel  Plus Transparent 3530 2 x 2 3/4''  5x7 cm  [facility]     wound care orders: cleans wound, dry thoroughly  and replace Comfeel. Leave in place for 7 days for 5 weeks 5 each 3     propranolol (INDERAL) 20 MG tablet Take 1 tablet (20 mg) by mouth 3 times daily 270 tablet 3     sertraline (ZOLOFT) 100 MG tablet Take 2 tablets (200 mg) by mouth daily 180 tablet 1     mupirocin (BACTROBAN) 2 % ointment Apply to anterior nares BID X 2 month supply 2 g 3     promethazine (PHENERGAN) 25 MG tablet Take 1 tablet (25 mg) by mouth every 6 hours as needed for nausea 40 tablet 11     order for DME Equipment being ordered: one donut for sitting 1 Device 0     sodium chloride 0.9 % SOLN Irrigate 20 cc each nostril QID X 2 month supply 78360 mL 3     blood glucose monitoring (NO BRAND SPECIFIED) test strip Use to test blood sugars one time daily or as  directed    Accucheck Richards plus test strips 1 Box 5     albuterol (PROAIR HFA, PROVENTIL HFA, VENTOLIN HFA) 108 (90 BASE) MCG/ACT inhaler Inhale 2 puffs into the lungs every 6 hours as needed for shortness of breath / dyspnea or wheezing Ventolin or other covered brand 3 Inhaler 3     Syringe, Disposable, 25 ML MISC Syringe to be used for nasal irrigation 1 each 3     potassium chloride SA (POTASSIUM CHLORIDE) 20 MEQ tablet Take 3 tablets (60 mEq) by mouth daily 180 tablet 3     omeprazole (PRILOSEC) 40 MG capsule Take 1 capsule (40 mg) by mouth daily Take 30-60 minutes before a meal. (Patient taking differently: Take 40 mg by mouth daily as needed Take 30-60 minutes before a meal.) 90 capsule 3     Alcohol Swabs (ALCOHOL PADS) 70 % PADS 1 Box 4 times daily 100 each 3     COMPRESSION STOCKINGS Knee high compression socks 30-40-mm 2 each 1     blood glucose monitoring (FREESTYLE) lancets Use to test blood sugars 2 times daily or as directed. 1 Box 3     Blood Glucose Monitoring Suppl (ACURA BLOOD GLUCOSE METER) W/DEVICE KIT 1 Dose 2 times daily 1 kit 1     ORDER FOR DME Equipment being ordered: BP cuff 1 Device 0     ACE/ARB NOT PRESCRIBED, INTENTIONAL, ACE & ARB not prescribed due to Allergy         Objective:   There were no vitals taken for this visit.    General: Patient is well groomed, appears stated age, is alert and cooperative, and is in no acute distress.  Skin: LE skin color, texture and turgor are normal. Left hallux nail is mildly ingrown at medial border without signs of infection - no erythema, edema, odor, drainage or pain. Right hallux nail is completely removed, however, there are hyperkeratotic tissue spicules that are growing from the medial and lateral nail borders which are painful and ingrowing into surrounding skin. No erythema, edema, odor, drainage or pain. There is dried blood to the medial border.    Assessment:   - Ingrown toenail of right hallux     Plan:   - After discussion with the  patient of the procedure, risks, benefits, complications, and expected outcome, a right total both border nail avulsion was decided as the course of treatment. Consent was signed. After skin prep with EtOH, a local block was administered into the right  first toe consisting of  3cc's of  2% lidocaine. After anesthesia was achieved, a tourniquet was applied for hemostasis. The digit was prepped and draped in sterile technique. A freer was used to separate the nail from the underlying bed and from the eponychium. The offending nail border was then removed. The gutter was inspected for any remaining nail spicules, of which none were found. Phenol was applied to both nail borders x 3 and then the nail was flushed with alcohol. The tourniquet was removed. The digit was covered with Bacitracin, Adaptic and a dry, sterile dressing. Pt tolerated the procedure and anesthesia well with no complications. Patient experienced sympathetic response and became dizzy, sweated and vomited. Patient was improved by end of visit.  - Pt to change the dressing tomorrow with antibiotic ointment and a bandaid after soaking in epsom salt bath x 10 min.  - Will return to clinic in 10 to 14 days, call sooner with concerns.        Again, thank you for allowing me to participate in the care of your patient.      Sincerely,    Cristy Robledo PA-C

## 2018-01-25 NOTE — MR AVS SNAPSHOT
After Visit Summary   1/25/2018    Erwin Aguilar    MRN: [id number]           Patient Information     Date Of Birth          1960        Visit Information        Provider Department      1/25/2018 7:30 AM Cristy Robledo PA-C [facility] Wound Care        Today's Diagnoses     Ingrown toenail    -  1       Follow-ups after your visit        Your next 10 appointments already scheduled     Jan 25, 2018  8:30 AM CST   (Arrive by 8:15 AM)   New Patient Visit with Jenn Dickens MD   [facility] and Infectious Diseases ([facility])    [address] [place] [address]  [address]  [facility] [address]   [phone]            Jan 29, 2018  7:30 AM CST   RETURN WOUND NURSE VISIT with Breanna Sahni RN   [facility] Wound Ostomy ([facility])    [address] [place] [address]  4th Floor  [facility] [id number] [phone]            Feb 01, 2018  8:30 AM CST   (Arrive by 8:15 AM)   NEW DIABETES with Estela Walker MD   [facility] Endocrinology ([facility])    [address] [facility]  3rd Floor  [facility] [address] [phone]            Mar 06, 2018  9:00 AM CST   Lab with  LAB   [facility] Lab ([facility])    [address] [place] [address]  1st Floor  [facility] [address] [phone]            Mar 06, 2018 10:00 AM CST   (Arrive by 9:45 AM)   Return General Liver with Kimberly Ramirez MD   [facility] Hepatology ([facility])    [address] [place] [address]  [address]  [facility] [address] [phone]              Who to contact     Please call your clinic at [phone] to:    Ask questions about your health    Make or cancel appointments    Discuss your medicines    Learn about your test results    Speak to your doctor   If you have compliments or concerns about an experience at your clinic, or if you wish to file a complaint, please contact  [facility] Patient Relations at [phone] or email us at [email or web address]         Additional Information About Your Visit        Link To [employer] Information     [employer] To [employer] gives you secure access to your electronic health record. If you see a primary care provider, you can also send messages to your care team and make appointments. If you have questions, please call your primary care clinic.  If you do not have a primary care provider, please call [phone] and they will assist you.      [employer] is an electronic gateway that provides easy, online access to your medical records. With [employer], you can request a clinic appointment, read your test results, renew a prescription or communicate with your care team.     To access your existing account, please contact your [facility] or call [phone] for assistance.        Care EveryWhere ID     This is your Care EveryWhere ID. This could be used by other organizations to access your [facility] medical records  [id number]         Blood Pressure from Last 3 Encounters:   12/15/17 (P) 173/87   11/29/17 (!) 174/91   10/20/17 174/89    Weight from Last 3 Encounters:   12/15/17 220 lb   11/29/17 227 lb   10/20/17 224 lb              We Performed the Following     REMOVAL OF NAIL PLATE SIMPLE SINGLE          Today's Medication Changes          These changes are accurate as of 1/25/18  8:29 AM.  If you have any questions, ask your nurse or doctor.               Start taking these medicines.        Dose/Directions    phenol 89 % Swab swab   Commonly known as:  PHENOL EZ SWABS   Used for:  Ingrown toenail   Started by:  Cristy Robledo PA-C        Dose:  1 each   Externally apply 1 each topically DURING SURGERY for 1 dose   Quantity:  3 each   Refills:  0         These medicines have changed or have updated prescriptions.        Dose/Directions    clindamycin 150 MG capsule   Commonly known as:  CLEOCIN   This may  have changed:    - when to take this  - reasons to take this  - additional instructions   Used for:  Chronic sinusitis, unspecified location        OPEN 1 CAP AND PLACE IN 1 LITER OF NORMAL SALINE & MIX. IRRIGATE WITH 20 ML EACH NOSTRIL 4 TIMES PER DAY   Quantity:  10 capsule   Refills:  3       omeprazole 40 MG capsule   Commonly known as:  priLOSEC   This may have changed:    - when to take this  - reasons to take this  - additional instructions   Used for:  Gastroesophageal reflux disease without esophagitis        Dose:  40 mg   Take 1 capsule (40 mg) by mouth daily Take 30-60 minutes before a meal.   Quantity:  90 capsule   Refills:  3            Where to get your medicines      Some of these will need a paper prescription and others can be bought over the counter.  Ask your nurse if you have questions.     Bring a paper prescription for each of these medications     phenol 89 % Swab swab                Primary Care Provider Office Phone # Fax #    Joel Daniel Irwin Wegener, [phone]       [address] [facility] [address]        Equal Access to Services     [facility] AH: Hadii mary ku hadasho Soomaali, waaxda luqadaha, qaybta kaalmada adeegyada, waxay maylinin hayisidro allison . So [facility] [phone].    ATENCIÓN: Si habla español, tiene a camp disposición servicios gratuitos de asistencia lingüística. Llame al [phone].    We comply with applicable federal civil rights laws and [place] laws. We do not discriminate on the basis of race, color, national origin, age, disability, sex, sexual orientation, or gender identity.            Thank you!     Thank you for choosing [facility]  for your care. Our goal is always to provide you with excellent care. Hearing back from our patients is one way we can continue to improve our services. Please take a few minutes to complete the written survey that you may receive in the mail after your visit with us. Thank you!              Your Updated Medication List - Protect others around you: Learn how to safely use, store and throw away your medicines at www.disposemymeds.org.          This list is accurate as of 1/25/18  8:29 AM.  Always use your most recent med list.                   Brand Name Dispense Instructions for use Diagnosis    * ACE/ARB/ARNI NOT PRESCRIBED (INTENTIONAL)      ACE & ARB not prescribed due to Allergy        ACURA BLOOD GLUCOSE METER W/DEVICE Kit     1 kit    1 Dose 2 times daily    Type 2 diabetes, HbA1c goal < 7% (H)       albuterol 108 (90 BASE) MCG/ACT Inhaler    PROAIR HFA/PROVENTIL HFA/VENTOLIN HFA    3 Inhaler    Inhale 2 puffs into the lungs every 6 hours as needed for shortness of breath / dyspnea or wheezing Ventolin or other covered brand    Moderate persistent asthma without complication       Alcohol Pads 70 % Pads     100 each    1 Box 4 times daily    Type 2 diabetes, HbA1c goal < 7% (H)       ammonium lactate 12 % cream    LAC-HYDRIN    385 g    Apply topically 2 times daily as needed for dry skin    Xerosis cutis       baclofen 10 MG tablet    LIORESAL    180 tablet    TAKE 2 TABLETS BY MOUTH THREE TIMES DAILY    Muscle spasms of both lower extremities, Back muscle spasm       BASAGLAR 100 UNIT/ML injection     30 mL    Inject 26 Units Subcutaneous daily    Type 2 diabetes mellitus without complication, with long-term current use of insulin (H)       BD ROSE U/F 32G X 4 MM   Generic drug:  insulin pen needle     100 each    USE ONCE DAILY AS DIRECTED    Type 2 diabetes mellitus without complication, with long-term current use of insulin (H)       blood glucose monitoring lancets     1 Box    Use to test blood sugars 2 times daily or as directed.    Type 2 diabetes, HbA1c goal < 7% (H)       blood glucose monitoring test strip    no brand specified    1 Box    Use to test blood sugars one time daily or as directed  Accucheck Richards plus test strips    Type 2 diabetes, HbA1c goal < 7% (H)       cephALEXin  "500 MG capsule    KEFLEX    68 capsule    Take 1 capsule (500 mg) by mouth 4 times daily START THREE (3) DAYS BEFORE SURGERY    Failure of spinal cord stimulator, initial encounter (H)       clindamycin 150 MG capsule    CLEOCIN    10 capsule    OPEN 1 CAP AND PLACE IN 1 LITER OF NORMAL SALINE & MIX. IRRIGATE WITH 20 ML EACH NOSTRIL 4 TIMES PER DAY    Chronic sinusitis, unspecified location       COMPRESSION STOCKINGS     2 each    Knee high compression socks 30-40-mm    Lymphedema of both lower extremities       hydrochlorothiazide 25 MG tablet    HYDRODIURIL     TAKE 1 TABLET (25 MG) BY MOUTH DAILY        mupirocin 2 % ointment    BACTROBAN    2 g    Apply to anterior nares BID X 2 month supply    Nasal lesion, Nasal vestibulitis       omeprazole 40 MG capsule    priLOSEC    90 capsule    Take 1 capsule (40 mg) by mouth daily Take 30-60 minutes before a meal.    Gastroesophageal reflux disease without esophagitis       * order for DME     1 Device    Equipment being ordered: BP cuff    Hypertension goal BP (blood pressure) < 140/90       * order for DME     1 Device    Equipment being ordered: one donut for sitting    Pressure ulcer, stage II       * order for DME     5 each    ?Location:sacral ulcer unknown etiology possible sheer ?measurement: 2.5 cm x 2 cm x 0.1 cm Unlikely to be a  pressure ulcer but if it is it's a stg 2,  partial thickness, low eexudate  Wound supplies : 5 each Comfeel? Plus [id number] x 2 3/4''  5x7 cm  [facility]   wound care orders: cleans wound, dry thoroughly  and replace Comfeel. Leave in place for 7 days for 5 weeks    Wound, open, buttock, right, initial encounter       * order for DME     1 Box    Equipment being ordered: wound care supplies CONVATEC DUODERM  Extra Thin Dressing - 1 1/4\" x 1 1/2\", Spots, oval [id number] Wound measurements 3 cm x 3 cm x 0.1 cm lesions with wound area of 1.5 x 1.5 that has slight drainage, full thickness located on the right sacral region    " Benign neoplasm of skin of trunk, except scrotum       phenol 89 % Swab swab    PHENOL EZ SWABS    3 each    Externally apply 1 each topically DURING SURGERY for 1 dose    Ingrown toenail       potassium chloride SA 20 MEQ CR tablet    KLOR-CON    180 tablet    Take 3 tablets (60 mEq) by mouth daily    Hypokalemia       promethazine 25 MG tablet    PHENERGAN    40 tablet    Take 1 tablet (25 mg) by mouth every 6 hours as needed for nausea    Nausea without vomiting       propranolol 20 MG tablet    INDERAL    270 tablet    Take 1 tablet (20 mg) by mouth 3 times daily    Esophageal varices in cirrhosis (H)       sertraline 100 MG tablet    ZOLOFT    180 tablet    Take 2 tablets (200 mg) by mouth daily    Generalized anxiety disorder       sodium chloride 0.9 % Soln     50728 mL    Irrigate 20 cc each nostril QID X 2 month supply    Chronic maxillary sinusitis       Syringe (Disposable) 25 ML Misc     1 each    Syringe to be used for nasal irrigation    Chronic maxillary sinusitis       triamcinolone 0.1 % ointment    KENALOG    30 g    Apply sparingly to affected area three times daily for 14 days.    Eczema, unspecified type       * Urea 20 % Crea cream     85 g    Apply topically as needed    Xerosis of skin       * Urea 20 % Crea cream     85 g    Apply topically daily To affected toes    Xerosis of skin, Tyloma, Nail avulsion of toe, subsequent encounter       * Notice:  This list has 7 medication(s) that are the same as other medications prescribed for you. Read the directions carefully, and ask your doctor or other care provider to review them with you.

## 2018-01-25 NOTE — PROGRESS NOTES
"Chief Complaint:   Chief Complaint   Patient presents with     WOUND CARE     right toenail issue     Subjective: Erwin is a 57 year old male who presents to the clinic today for painful right toenail. This same nail has been bothering him for about a year now. The Urea is only mildly helpful. There are spicules that continue to grow into the borders of his right toenail that cause pain and bleeding. He would also like the left toenail removed at some point, because it continues to ingrow and become painful, though it is not today.     ROS: Admits pain and bleeding to right hallux nail bed. Denies purulent drainage, redness or swelling to the right hallux.     Allergies   Allergen Reactions     Lisinopril Anaphylaxis     Swollen tongue; inability to swallow; drooling; hives; swollen face, neck, angioedema     Acetaminophen      Hx of cirrhosis      Amlodipine      Swelling, hives, possible angioedema       Keflex [Cephalexin]      Morphine      Hypotension     Bactrim [Sulfamethoxazole W/Trimethoprim] Rash     Levaquin Swelling and Rash     Swelling in lip and tongue felt thick     Current Outpatient Rx   Medication Sig Dispense Refill     triamcinolone (KENALOG) 0.1 % ointment Apply sparingly to affected area three times daily for 14 days. 30 g 0     order for DME Equipment being ordered: wound care supplies  CONVATEC DUODERM  Extra Thin Dressing - 1 1/4\" x 1 1/2\", Spots, oval  [id number]  Wound measurements  3 cm x 3 cm x 0.1 cm lesions with wound area of 1.5 x 1.5 that has slight drainage, full thickness located on the right sacral region 1 Box 3     BD ROSE U/F 32G X 4 MM insulin pen needle USE ONCE DAILY AS DIRECTED 100 each 11     cephALEXin (KEFLEX) 500 MG capsule Take 1 capsule (500 mg) by mouth 4 times daily START THREE (3) DAYS BEFORE SURGERY 68 capsule 0     BASAGLAR 100 UNIT/ML injection Inject 26 Units Subcutaneous daily 30 mL 11     Urea 20 % CREA cream Apply topically as needed 85 g 6     Urea 20 % " CREA cream Apply topically daily To affected toes 85 g 11     ammonium lactate (LAC-HYDRIN) 12 % cream Apply topically 2 times daily as needed for dry skin 385 g 3     baclofen (LIORESAL) 10 MG tablet TAKE 2 TABLETS BY MOUTH THREE TIMES DAILY 180 tablet 8     clindamycin (CLEOCIN) 150 MG capsule OPEN 1 CAP AND PLACE IN 1 LITER OF NORMAL SALINE & MIX. IRRIGATE WITH 20 ML EACH NOSTRIL 4 TIMES PER DAY (Patient taking differently: 4 times daily as needed OPEN 1 CAP AND PLACE IN 1 LITER OF NORMAL SALINE & MIX. IRRIGATE WITH 20 ML EACH NOSTRIL 4 TIMES PER DAY) 10 capsule 3     order for DME   Location:sacral ulcer unknown etiology possible sheer    measurement: 2.5 cm x 2 cm x 0.1 cm Unlikely to be a  pressure ulcer but if it is it's a stg 2,  partial thickness, low eexudate    Wound supplies :  5 each Comfeel  Plus Transparent 3530 2 x 2 3/4''  5x7 cm  Formerly [facility]     wound care orders: cleans wound, dry thoroughly  and replace Comfeel. Leave in place for 7 days for 5 weeks 5 each 3     propranolol (INDERAL) 20 MG tablet Take 1 tablet (20 mg) by mouth 3 times daily 270 tablet 3     sertraline (ZOLOFT) 100 MG tablet Take 2 tablets (200 mg) by mouth daily 180 tablet 1     mupirocin (BACTROBAN) 2 % ointment Apply to anterior nares BID X 2 month supply 2 g 3     promethazine (PHENERGAN) 25 MG tablet Take 1 tablet (25 mg) by mouth every 6 hours as needed for nausea 40 tablet 11     order for DME Equipment being ordered: one donut for sitting 1 Device 0     sodium chloride 0.9 % SOLN Irrigate 20 cc each nostril QID X 2 month supply 88280 mL 3     blood glucose monitoring (NO BRAND SPECIFIED) test strip Use to test blood sugars one time daily or as directed    Accucheck Richards plus test strips 1 Box 5     albuterol (PROAIR HFA, PROVENTIL HFA, VENTOLIN HFA) 108 (90 BASE) MCG/ACT inhaler Inhale 2 puffs into the lungs every 6 hours as needed for shortness of breath / dyspnea or wheezing Ventolin or other covered brand 3 Inhaler 3      Syringe, Disposable, 25 ML MISC Syringe to be used for nasal irrigation 1 each 3     potassium chloride SA (POTASSIUM CHLORIDE) 20 MEQ tablet Take 3 tablets (60 mEq) by mouth daily 180 tablet 3     omeprazole (PRILOSEC) 40 MG capsule Take 1 capsule (40 mg) by mouth daily Take 30-60 minutes before a meal. (Patient taking differently: Take 40 mg by mouth daily as needed Take 30-60 minutes before a meal.) 90 capsule 3     Alcohol Swabs (ALCOHOL PADS) 70 % PADS 1 Box 4 times daily 100 each 3     COMPRESSION STOCKINGS Knee high compression socks 30-40-mm 2 each 1     blood glucose monitoring (FREESTYLE) lancets Use to test blood sugars 2 times daily or as directed. 1 Box 3     Blood Glucose Monitoring Suppl (ACURA BLOOD GLUCOSE METER) W/DEVICE KIT 1 Dose 2 times daily 1 kit 1     ORDER FOR DME Equipment being ordered: BP cuff 1 Device 0     ACE/ARB NOT PRESCRIBED, INTENTIONAL, ACE & ARB not prescribed due to Allergy         Objective:   There were no vitals taken for this visit.    General: Patient is well groomed, appears stated age, is alert and cooperative, and is in no acute distress.  Skin: LE skin color, texture and turgor are normal. Left hallux nail is mildly ingrown at medial border without signs of infection - no erythema, edema, odor, drainage or pain. Right hallux nail is completely removed, however, there are hyperkeratotic tissue spicules that are growing from the medial and lateral nail borders which are painful and ingrowing into surrounding skin. No erythema, edema, odor, drainage or pain. There is dried blood to the medial border.    Assessment:   - Ingrown toenail of right hallux     Plan:   - After discussion with the patient of the procedure, risks, benefits, complications, and expected outcome, a right total both border nail avulsion was decided as the course of treatment. Consent was signed. After skin prep with EtOH, a local block was administered into the right  first toe consisting of  3cc's  of  2% lidocaine. After anesthesia was achieved, a tourniquet was applied for hemostasis. The digit was prepped and draped in sterile technique. A freer was used to separate the nail from the underlying bed and from the eponychium. The offending nail border was then removed. The gutter was inspected for any remaining nail spicules, of which none were found. Phenol was applied to both nail borders x 3 and then the nail was flushed with alcohol. The tourniquet was removed. The digit was covered with Bacitracin, Adaptic and a dry, sterile dressing. Pt tolerated the procedure and anesthesia well with no complications. Patient experienced sympathetic response and became dizzy, sweated and vomited. Patient was improved by end of visit.  - Pt to change the dressing tomorrow with antibiotic ointment and a bandaid after soaking in epsom salt bath x 10 min.  - Will return to clinic in 10 to 14 days, call sooner with concerns.

## 2018-01-25 NOTE — NURSING NOTE
Erwin Aguilar      1.  Has the patient received the information for the influenza vaccine? YES    2.  Does the patient have any of the following contraindications?     Allergy to eggs? No     Allergic reaction to previous influenza vaccines? No     Any other problems to previous influenza vaccines? No     Paralyzed by Guillain-[place] syndrome? No     Currently pregnant? NO     Current moderate or severe illness? No     Allergy to contact lens solution? No    3.  The vaccine has been administered in the usual fashion and the patient was instructed to wait 20 minutes before leaving the building in the event of an allergic reaction: YES    Vaccination given by sol lim.  Recorded by Daisy Waddell

## 2018-01-25 NOTE — NURSING NOTE
"Chief Complaint   Patient presents with     Consult     consult skin infection       Initial /80 (BP Location: Right arm, Patient Position: Sitting, Cuff Size: Adult Large)  Pulse 54  Temp 97.7  F (36.5  C) (Oral)  Ht 1.803 m (5' 11\")  Wt 99.6 kg (219 lb 8 oz)  SpO2 95%  BMI 30.61 kg/m2 Estimated body mass index is 30.61 kg/(m^2) as calculated from the following:    Height as of this encounter: 1.803 m (5' 11\").    Weight as of this encounter: 99.6 kg (219 lb 8 oz).  Medication Reconciliation: complete     Velasquez Harris MA    "

## 2018-01-25 NOTE — LETTER
1/25/2018      RE: Erwin Aguilar  [address]  [place] [address]        [facility]  Dr. Jenn Dickens, [facility], Floor 3  [address] [place] [address]   Patient:  Erwin Aguilar, Date of birth 1960, Medical record number [id number]  Date of Visit:  01/25/2018         Assessment and Recommendations:     No problem-specific Assessment & Plan notes found for this encounter.      Jenn Dickens MD  Division of Infectious Diseases and International Medicine  [phone]         History of Infectious Disease Illness:         Past Medical and Surgical History:     Past Medical History:   Diagnosis Date     Actinic keratosis     aldara     Anxiety      Arrhythmia     prolonged QT     Asthma      Bleeding disorder (H) 3-27-16     Cancer (H)     squamous cell skin CA     Cauda equina spinal cord injury (H)      Chronic pain     right leg     Chronic pain syndrome      Chronic sinusitis 5-1-16     Diastasis recti      Esophageal reflux      Esophageal varices in cirrhosis (H) 4/1/2014    Hospitalized for UGI blee 3/28/14, endoscopy revealed bleeding varices.     Essential hypertension, benign      Generalized anxiety disorder      H/O dental abscess      Hernia      Liver disease 3-     MEDICAL HISTORY OF -     right leg numbness due to back injury     MEDICAL HISTORY OF -     alcoholism     MEDICAL HISTORY OF -     squamous cell     Mild depression (H)      Mixed hyperlipidemia      Nasal polyps 5-1-16     Other chronic pain      Seasonal allergic conjunctivitis      Type II or unspecified type diabetes mellitus without mention of complication, not stated as uncontrolled      Ulcer (H)     R foot     Umbilical hernia without mention of obstruction or gangrene 10/2012     Unspecified site of spinal cord injury without evidence of spinal bone injury        Past Surgical History:   Procedure Laterality Date     C APPENDECTOMY   1974     COLONOSCOPY N/A 5/12/2016    Procedure: COMBINED COLONOSCOPY, SINGLE OR MULTIPLE BIOPSY/POLYPECTOMY BY BIOPSY;  Surgeon: Ana Paula Urbina MD;  Location:  GI     ENDOSCOPY UPPER, COLONOSCOPY, COMBINED  10/19/2011    Procedure:COMBINED ENDOSCOPY UPPER, COLONOSCOPY; Upper Endoscopy, Colonoscopy with Polypectomy x4; Surgeon:AMBAR RODRÍGUEZ; Location:[facility] OR     ENT SURGERY  1-2016    Ongoing since then     ESOPHAGOSCOPY, GASTROSCOPY, DUODENOSCOPY (EGD), COMBINED  3/28/2014    Procedure: COMBINED ESOPHAGOSCOPY, GASTROSCOPY, DUODENOSCOPY (EGD);  EGD, Gastric Biopsies, Esophageal Banding;  Surgeon: Reyna Tovar MD;  [facility]     ESOPHAGOSCOPY, GASTROSCOPY, DUODENOSCOPY (EGD), COMBINED  6/9/2014    Procedure: COMBINED ESOPHAGOSCOPY, GASTROSCOPY, DUODENOSCOPY (EGD);  Surgeon: Curtis Mendez MD;  Location:  GI     ESOPHAGOSCOPY, GASTROSCOPY, DUODENOSCOPY (EGD), COMBINED  7/24/2014    Procedure: COMBINED ESOPHAGOSCOPY, GASTROSCOPY, DUODENOSCOPY (EGD);  Surgeon: Gerard Samaniego MD;  [facility]     ESOPHAGOSCOPY, GASTROSCOPY, DUODENOSCOPY (EGD), COMBINED N/A 10/31/2014    Procedure: COMBINED ESOPHAGOSCOPY, GASTROSCOPY, DUODENOSCOPY (EGD);  Surgeon: Gerard Samaniego MD;  [facility]     ESOPHAGOSCOPY, GASTROSCOPY, DUODENOSCOPY (EGD), COMBINED N/A 5/12/2016    Procedure: COMBINED ESOPHAGOSCOPY, GASTROSCOPY, DUODENOSCOPY (EGD);  Surgeon: Ana Paula Urbina MD;  [facility] GI     HCL SQUAMOUS CELL CARCINOMA AG  10/07    left forearm     HERNIORRHAPHY UMBILICAL  11/8/2012    Procedure: HERNIORRHAPHY UMBILICAL;  Open Umbilical Hernia Repair With Mesh ;  Surgeon: Chase Nicholson MD;  Location: [facility] OR     neuro stimulator  2010     SURGICAL HISTORY OF -   1/02    abcess tooth     SURGICAL HISTORY OF -   1999    L4-5 laminectomy, cauda equina syndrome     SURGICAL HISTORY OF -   +    herniated disk repair     TONSILLECTOMY  10 1964     TRANSPOSITION ULNAR  "NERVE (ELBOW)      right           Family History:     Family History   Problem Relation Age of Onset     CANCER Mother      lung     CANCER Father      esophogeal, GERD     DIABETES Brother      amputation, Type 1     DIABETES Brother      Cancer - colorectal No family hx of            Social History:     Social History   Substance Use Topics     Smoking status: Former Smoker     Packs/day: 0.50     Years: 1.00     Types: Cigarettes     Start date: 10/13/1983     Quit date: 6/9/1984     Smokeless tobacco: Never Used     Alcohol use No      Comment: a pint of alcohol / day - last drink was 3/28/14     Social History     Social History Narrative            Review of Systems:   CONSTITUTIONAL:  No fevers or chills  EYES: negative for icterus  ENT:  negative for hearing loss, tinnitus, sore throat  RESPIRATORY:  negative for cough, sputum or dyspnea  CARDIOVASCULAR:  negative for chest pain, palpitations  GASTROINTESTINAL:  negative for nausea, vomiting, diarrhea or constipation  GENITOURINARY:  negative for dysuria  HEME:  No easy bruising  INTEGUMENT:  negative for rash or pruritus  NEURO:  Negative for headache         Current Medications:     Current Outpatient Prescriptions   Medication Sig Dispense Refill     hydrochlorothiazide (HYDRODIURIL) 25 MG tablet TAKE 1 TABLET (25 MG) BY MOUTH DAILY  3     phenol (PHENOL EZ SWABS) 89 % SWAB swab Externally apply 1 each topically DURING SURGERY for 1 dose 3 each 0     triamcinolone (KENALOG) 0.1 % ointment Apply sparingly to affected area three times daily for 14 days. 30 g 0     order for DME Equipment being ordered: wound care supplies  CONVATEC DUODERM  Extra Thin Dressing - 1 1/4\" x 1 1/2\", Spots, oval  [id number]  Wound measurements  3 cm x 3 cm x 0.1 cm lesions with wound area of 1.5 x 1.5 that has slight drainage, full thickness located on the right sacral region 1 Box 3     BD ROSE U/F 32G X 4 MM insulin pen needle USE ONCE DAILY AS DIRECTED 100 each 11     " cephALEXin (KEFLEX) 500 MG capsule Take 1 capsule (500 mg) by mouth 4 times daily START THREE (3) DAYS BEFORE SURGERY 68 capsule 0     BASAGLAR 100 UNIT/ML injection Inject 26 Units Subcutaneous daily 30 mL 11     Urea 20 % CREA cream Apply topically as needed 85 g 6     Urea 20 % CREA cream Apply topically daily To affected toes 85 g 11     ammonium lactate (LAC-HYDRIN) 12 % cream Apply topically 2 times daily as needed for dry skin 385 g 3     baclofen (LIORESAL) 10 MG tablet TAKE 2 TABLETS BY MOUTH THREE TIMES DAILY 180 tablet 8     clindamycin (CLEOCIN) 150 MG capsule OPEN 1 CAP AND PLACE IN 1 LITER OF NORMAL SALINE & MIX. IRRIGATE WITH 20 ML EACH NOSTRIL 4 TIMES PER DAY (Patient taking differently: 4 times daily as needed OPEN 1 CAP AND PLACE IN 1 LITER OF NORMAL SALINE & MIX. IRRIGATE WITH 20 ML EACH NOSTRIL 4 TIMES PER DAY) 10 capsule 3     order for DME   Location:sacral ulcer unknown etiology possible sheer    measurement: 2.5 cm x 2 cm x 0.1 cm Unlikely to be a  pressure ulcer but if it is it's a stg 2,  partial thickness, low eexudate    Wound supplies :  5 each Comfeel  Plus Transparent 3530 2 x 2 3/4''  5x7 cm  [facility]     wound care orders: cleans wound, dry thoroughly  and replace Comfeel. Leave in place for 7 days for 5 weeks 5 each 3     propranolol (INDERAL) 20 MG tablet Take 1 tablet (20 mg) by mouth 3 times daily 270 tablet 3     sertraline (ZOLOFT) 100 MG tablet Take 2 tablets (200 mg) by mouth daily 180 tablet 1     mupirocin (BACTROBAN) 2 % ointment Apply to anterior nares BID X 2 month supply 2 g 3     promethazine (PHENERGAN) 25 MG tablet Take 1 tablet (25 mg) by mouth every 6 hours as needed for nausea 40 tablet 11     order for DME Equipment being ordered: one donut for sitting 1 Device 0     sodium chloride 0.9 % SOLN Irrigate 20 cc each nostril QID X 2 month supply 87143 mL 3     blood glucose monitoring (NO BRAND SPECIFIED) test strip Use to test blood sugars one time daily or as  directed    Accucheck Richards plus test strips 1 Box 5     albuterol (PROAIR HFA, PROVENTIL HFA, VENTOLIN HFA) 108 (90 BASE) MCG/ACT inhaler Inhale 2 puffs into the lungs every 6 hours as needed for shortness of breath / dyspnea or wheezing Ventolin or other covered brand 3 Inhaler 3     Syringe, Disposable, 25 ML MISC Syringe to be used for nasal irrigation 1 each 3     potassium chloride SA (POTASSIUM CHLORIDE) 20 MEQ tablet Take 3 tablets (60 mEq) by mouth daily 180 tablet 3     omeprazole (PRILOSEC) 40 MG capsule Take 1 capsule (40 mg) by mouth daily Take 30-60 minutes before a meal. (Patient taking differently: Take 40 mg by mouth daily as needed Take 30-60 minutes before a meal.) 90 capsule 3     Alcohol Swabs (ALCOHOL PADS) 70 % PADS 1 Box 4 times daily 100 each 3     COMPRESSION STOCKINGS Knee high compression socks 30-40-mm 2 each 1     blood glucose monitoring (FREESTYLE) lancets Use to test blood sugars 2 times daily or as directed. 1 Box 3     Blood Glucose Monitoring Suppl (ACURA BLOOD GLUCOSE METER) W/DEVICE KIT 1 Dose 2 times daily 1 kit 1     ORDER FOR DME Equipment being ordered: BP cuff 1 Device 0     ACE/ARB NOT PRESCRIBED, INTENTIONAL, ACE & ARB not prescribed due to Allergy              Immunization History:     Immunization History   Administered Date(s) Administered     Influenza (IIV3) PF 09/19/2011, 10/10/2016     Influenza Vaccine IM 3yrs+ 4 Valent IIV4 10/05/2015     Pneumo Conj 13-V (2010&after) 06/26/2015     Pneumococcal 23 valent 03/31/2014     TDAP Vaccine (Adacel) 06/01/2007            Allergies:     Allergies   Allergen Reactions     Lisinopril Anaphylaxis     Swollen tongue; inability to swallow; drooling; hives; swollen face, neck, angioedema     Acetaminophen      Hx of cirrhosis      Amlodipine      Swelling, hives, possible angioedema       Keflex [Cephalexin]      Morphine      Hypotension     Bactrim [Sulfamethoxazole W/Trimethoprim] Rash     Levaquin Swelling and Rash      "Swelling in lip and tongue felt thick            Physical Exam:   Vital signs:  /80 (BP Location: Right arm, Patient Position: Sitting, Cuff Size: Adult Large)  Pulse 54  Temp 97.7  F (36.5  C) (Oral)  Ht 1.803 m (5' 11\")  Wt 99.6 kg (219 lb 8 oz)  SpO2 95%  BMI 30.61 kg/m2    Physical Examination:  GENERAL:  well-developed, well-nourished, seated in no acute distress.  HEENT:  Head is normocephalic, atraumatic   EYES:  Eyes have anicteric sclerae without conjunctival injection   ENT:  Oropharynx is moist without exudates or ulcers. Tongue is midline  NECK:  Supple. No cervical lymphadenopathy  LUNGS:  Clear to auscultation bilateral.   CARDIOVASCULAR:  Regular rate and rhythm with no murmurs, gallops or rubs.  ABDOMEN:  Normal bowel sounds, soft, nontender. No appreciable hepatosplenomegaly.  SKIN:  No acute rashes.    NEUROLOGIC:  Grossly nonfocal. Active x4 extremities         Laboratory Data:     Metabolic Studies   Sodium   Date Value Ref Range Status   12/20/2017 137 133 - 144 mmol/L Final   10/03/2017 141 133 - 144 mmol/L Final     Potassium   Date Value Ref Range Status   12/20/2017 3.1 (L) 3.4 - 5.3 mmol/L Final   10/03/2017 3.8 3.4 - 5.3 mmol/L Final     Chloride   Date Value Ref Range Status   12/20/2017 102 94 - 109 mmol/L Final   10/03/2017 106 94 - 109 mmol/L Final     Carbon Dioxide   Date Value Ref Range Status   12/20/2017 28 20 - 32 mmol/L Final   10/03/2017 29 20 - 32 mmol/L Final     Anion Gap   Date Value Ref Range Status   12/20/2017 7 3 - 14 mmol/L Final   10/03/2017 6 3 - 14 mmol/L Final     Urea Nitrogen   Date Value Ref Range Status   12/20/2017 7 7 - 30 mg/dL Final   10/03/2017 6 (L) 7 - 30 mg/dL Final     Creatinine   Date Value Ref Range Status   12/20/2017 0.68 0.66 - 1.25 mg/dL Final   10/03/2017 0.64 (L) 0.66 - 1.25 mg/dL Final     GFR Estimate   Date Value Ref Range Status   12/20/2017 >90 >60 mL/min/1.7m2 Final     Comment:     Non  GFR Calc   10/03/2017 " >90 >60 mL/min/1.7m2 Final     Comment:     Non  GFR Calc     Glucose   Date Value Ref Range Status   12/20/2017 185 (H) 70 - 99 mg/dL Final   10/03/2017 138 (H) 70 - 99 mg/dL Final     Hemoglobin A1C   Date Value Ref Range Status   12/20/2017 6.4 (H) 4.3 - 6.0 % Final     Calcium   Date Value Ref Range Status   12/20/2017 8.7 8.5 - 10.1 mg/dL Final   10/03/2017 8.2 (L) 8.5 - 10.1 mg/dL Final     Phosphorus   Date Value Ref Range Status   05/21/2013 2.9 2.5 - 4.5 mg/dL Final   08/26/2009 3.4 2.5 - 4.5 mg/dL Final     Magnesium   Date Value Ref Range Status   07/11/2015 1.9 1.6 - 2.3 mg/dL Final   10/06/2014 1.6 1.6 - 2.3 mg/dL Final     Lactic Acid   Date Value Ref Range Status   11/29/2014 2.0 0.7 - 2.1 mmol/L Final     CRP Inflammation   Date Value Ref Range Status   08/10/2015 6.4 0.0 - 8.0 mg/L Final   11/29/2014 21.9 (H) 0.0 - 8.0 mg/L Final   10/06/2014 9.9 (H) 0.0 - 8.0 mg/L Final   10/01/2014 13.9 (H) 0.0 - 8.0 mg/L Final   09/28/2014 16.4 (H) 0.0 - 8.0 mg/L Final   07/29/2014 19.6 (H) 0.0 - 8.0 mg/L Final       Inflammatory Markers   CRP Inflammation   Date Value Ref Range Status   08/10/2015 6.4 0.0 - 8.0 mg/L Final   11/29/2014 21.9 (H) 0.0 - 8.0 mg/L Final   10/06/2014 9.9 (H) 0.0 - 8.0 mg/L Final   10/01/2014 13.9 (H) 0.0 - 8.0 mg/L Final   09/28/2014 16.4 (H) 0.0 - 8.0 mg/L Final   07/29/2014 19.6 (H) 0.0 - 8.0 mg/L Final       Hepatic Studies    Bilirubin Total   Date Value Ref Range Status   10/03/2017 0.7 0.2 - 1.3 mg/dL Final   04/07/2017 0.5 0.2 - 1.3 mg/dL Final     Alkaline Phosphatase   Date Value Ref Range Status   10/03/2017 136 40 - 150 U/L Final   04/07/2017 124 40 - 150 U/L Final     Albumin   Date Value Ref Range Status   10/03/2017 4.0 3.4 - 5.0 g/dL Final   04/07/2017 3.8 3.4 - 5.0 g/dL Final     AST   Date Value Ref Range Status   10/03/2017 19 0 - 45 U/L Final   04/07/2017 19 0 - 45 U/L Final     ALT   Date Value Ref Range Status   10/03/2017 30 0 - 70 U/L Final    04/07/2017 24 0 - 70 U/L Final       Hematology Studies      WBC   Date Value Ref Range Status   12/20/2017 5.6 4.0 - 11.0 10e9/L Final   10/03/2017 8.1 4.0 - 11.0 10e9/L Final     Absolute Neutrophil   Date Value Ref Range Status   12/20/2017 3.3 1.6 - 8.3 10e9/L Final   08/23/2016 4.1 1.6 - 8.3 10e9/L Final     Absolute Lymphocytes   Date Value Ref Range Status   12/20/2017 1.5 0.8 - 5.3 10e9/L Final   08/23/2016 2.4 0.8 - 5.3 10e9/L Final     Absolute Monocytes   Date Value Ref Range Status   12/20/2017 0.5 0.0 - 1.3 10e9/L Final   08/23/2016 0.7 0.0 - 1.3 10e9/L Final     Absolute Eosinophils   Date Value Ref Range Status   12/20/2017 0.3 0.0 - 0.7 10e9/L Final   08/23/2016 0.4 0.0 - 0.7 10e9/L Final     Hemoglobin   Date Value Ref Range Status   12/20/2017 12.5 (L) 13.3 - 17.7 g/dL Final   10/03/2017 13.4 13.3 - 17.7 g/dL Final     Hematocrit   Date Value Ref Range Status   12/20/2017 39.0 (L) 40.0 - 53.0 % Final   10/03/2017 40.2 40.0 - 53.0 % Final     Platelet Count   Date Value Ref Range Status   12/20/2017 158 150 - 450 10e9/L Final   10/03/2017 194 150 - 450 10e9/L Final       Imaging:  No results found for this or any previous visit (from the past 744 hour(s)).           [facility]  Dr. Jenn Dickens, [facility], Floor 3  [address] [place] [address]   Patient:  Erwin Aguilar, Date of birth 1960, Medical record number [id number]  Date of Visit:  01/25/2018         Assessment and Recommendations:     Recurrent Cellulitis  Mr. Aguilar has had a history of cellulitis at several sites including his lower extremities and his face.  We discussed at some length today the importance of close management of underlying conditions that increase his risk of cellulitis.  This includes DM management and evaluation for underlying vascular pathology. His primary care physician has been proactive with this.  We discussed that in patients with recurrent  cellulitis that penicillin prophylaxis has been studied and is effective in reducing episodes.  He was interested in this and I prescribed him this today.  He has a Keflex allergy that he cannot recall, but tolerated Amoxicillin in the past. I also counseled him today on what to do in the event of an allergic response to the penicillin.  I advised that he have diphenhydramine at home which he should take if he develops an urticarial rash or breathing issues.  I advise with either of these reactions that he stop the medication and contact us.  In the case of difficult breathing or throat/mouth swelling, he should seek emergency care.     History of a overlying cellulitis at the site of a stimulator device  Routine perioperative antibiotics are indicated for this procedure. He does not have a history of colonization with drug resistant organisms.  Luckily he had no device infection in the past. If Mr. Aguilar does develop any surgical site issues, I am happy to help with management.    Need for Influenza Vaccination  We will do this today.     Jenn Dickens MD  Division of Infectious Diseases and International Medicine  [phone]    I spent more than 30 minutes today in face to face time managing the care of Erwin Aguilar.  Over 50% of my time on the unit was spent counseling the patient and/or coordinating care regarding services listed in this note.           History of Infectious Disease Illness:   Mr. Aguilar is a 57 year old man who presents for evaluation of recurrent soft tissue infections.  This includes both cellulitis is the lower extremity.  He has also occasionally gotten episodes on his face.  He was referred to me today because he previously developed cellulitis over the site of a stimulator device and he is due to have this replaced.  Of note, he did not have an actual device infection so if it was indeed an infectious wound issues, it was very superficial.  He has been prescribed Keflex by  his surgical team.  However, he has a charted Keflex allergy.  Mr. Aguilar is a very poor historian with reference to this allergy.  He cannot recall getting the drug, a reaction to it and how it got in the chart.  I do see that he received Augmentin in the past for a sinus infection which he tolerated without any issues. He reports that currently, he has not been having significant health issues.  He is currently being managed by podiatry for an ingrown toenail.         Past Medical and Surgical History:     Past Medical History:   Diagnosis Date     Actinic keratosis     aldara     Anxiety      Arrhythmia     prolonged QT     Asthma      Bleeding disorder (H) 3-27-16     Cancer (H)     squamous cell skin CA     Cauda equina spinal cord injury (H)      Chronic pain     right leg     Chronic pain syndrome      Chronic sinusitis 5-1-16     Diastasis recti      Esophageal reflux      Esophageal varices in cirrhosis (H) 4/1/2014    Hospitalized for UGI blee 3/28/14, endoscopy revealed bleeding varices.     Essential hypertension, benign      Generalized anxiety disorder      H/O dental abscess      Hernia      Liver disease 3-     MEDICAL HISTORY OF -     right leg numbness due to back injury     MEDICAL HISTORY OF -     alcoholism     MEDICAL HISTORY OF -     squamous cell     Mild depression (H)      Mixed hyperlipidemia      Nasal polyps 5-1-16     Other chronic pain      Seasonal allergic conjunctivitis      Type II or unspecified type diabetes mellitus without mention of complication, not stated as uncontrolled      Ulcer (H)     R foot     Umbilical hernia without mention of obstruction or gangrene 10/2012     Unspecified site of spinal cord injury without evidence of spinal bone injury        Past Surgical History:   Procedure Laterality Date     C APPENDECTOMY  1974     COLONOSCOPY N/A 5/12/2016    Procedure: COMBINED COLONOSCOPY, SINGLE OR MULTIPLE BIOPSY/POLYPECTOMY BY BIOPSY;  Surgeon: Ciara  Ana Paula Reyes MD;  Location:  GI     ENDOSCOPY UPPER, COLONOSCOPY, COMBINED  10/19/2011    Procedure:COMBINED ENDOSCOPY UPPER, COLONOSCOPY; Upper Endoscopy, Colonoscopy with Polypectomy x4; Surgeon:AMBAR RODRÍGUEZ; Location: OR     ENT SURGERY  1-2016    Ongoing since then     ESOPHAGOSCOPY, GASTROSCOPY, DUODENOSCOPY (EGD), COMBINED  3/28/2014    Procedure: COMBINED ESOPHAGOSCOPY, GASTROSCOPY, DUODENOSCOPY (EGD);  EGD, Gastric Biopsies, Esophageal Banding;  Surgeon: Reyna Tovar MD;  Location:  OR     ESOPHAGOSCOPY, GASTROSCOPY, DUODENOSCOPY (EGD), COMBINED  6/9/2014    Procedure: COMBINED ESOPHAGOSCOPY, GASTROSCOPY, DUODENOSCOPY (EGD);  Surgeon: Curtis Mendez MD;  Location:  GI     ESOPHAGOSCOPY, GASTROSCOPY, DUODENOSCOPY (EGD), COMBINED  7/24/2014    Procedure: COMBINED ESOPHAGOSCOPY, GASTROSCOPY, DUODENOSCOPY (EGD);  Surgeon: Gerard Samaniego MD;  Location:  OR     ESOPHAGOSCOPY, GASTROSCOPY, DUODENOSCOPY (EGD), COMBINED N/A 10/31/2014    Procedure: COMBINED ESOPHAGOSCOPY, GASTROSCOPY, DUODENOSCOPY (EGD);  Surgeon: Gerard Samaniego MD;  Location:  OR     ESOPHAGOSCOPY, GASTROSCOPY, DUODENOSCOPY (EGD), COMBINED N/A 5/12/2016    Procedure: COMBINED ESOPHAGOSCOPY, GASTROSCOPY, DUODENOSCOPY (EGD);  Surgeon: Ana Paula Urbina MD;  Location:  GI     HCL SQUAMOUS CELL CARCINOMA AG  10/07    left forearm     HERNIORRHAPHY UMBILICAL  11/8/2012    Procedure: HERNIORRHAPHY UMBILICAL;  Open Umbilical Hernia Repair With Mesh ;  Surgeon: Chase Nicholson MD;  Location:  OR     neuro stimulator  2010     SURGICAL HISTORY OF -   1/02    abcess tooth     SURGICAL HISTORY OF -   1999    L4-5 laminectomy, cauda equina syndrome     SURGICAL HISTORY OF -   +    herniated disk repair     TONSILLECTOMY  10 1964     TRANSPOSITION ULNAR NERVE (ELBOW)      right           Family History:     Family History   Problem Relation Age of Onset     CANCER Mother      lung     CANCER  Father      esophogeal, GERD     DIABETES Brother      amputation, Type 1     DIABETES Brother      Cancer - colorectal No family hx of            Social History:     Social History   Substance Use Topics     Smoking status: Former Smoker     Packs/day: 0.50     Years: 1.00     Types: Cigarettes     Start date: 10/13/1983     Quit date: 6/9/1984     Smokeless tobacco: Never Used     Alcohol use No      Comment: a pint of alcohol / day - last drink was 3/28/14     Social History     Social History Narrative            Review of Systems:   Answers for HPI/ROS submitted by the patient on 1/23/2018   General Symptoms: No  Skin Symptoms: Yes  HENT Symptoms: Yes  EYE SYMPTOMS: No  HEART SYMPTOMS: No  LUNG SYMPTOMS: No  INTESTINAL SYMPTOMS: No  URINARY SYMPTOMS: No  REPRODUCTIVE SYMPTOMS: No  SKELETAL SYMPTOMS: No  BLOOD SYMPTOMS: No  NERVOUS SYSTEM SYMPTOMS: No  MENTAL HEALTH SYMPTOMS: No  Changes in hair: No  Changes in moles/birth marks: No  Itching: No  Changes in nails: No  Acne: No  Change in facial hair: No  Warts: Yes  Non-healing sores: No  Scarring: Yes  Color changes of hands/feet in cold : Yes  Sun sensitivity: No  Skin thickening: No  Ear pain: No  Ear discharge: No  Hearing loss: No  Tinnitus: No  Nosebleeds: No  Congestion: No  Sinus pain: No  Trouble swallowing: No   Voice hoarseness: No  Mouth sores: No  Sore throat: No  Tooth pain: Yes  Gum tenderness: Yes  Bleeding gums: Yes  Change in taste: No  Change in sense of smell: No  Dry mouth: No  Hearing aid used: No  Neck lump: No           Current Medications:     Current Outpatient Prescriptions   Medication Sig Dispense Refill     hydrochlorothiazide (HYDRODIURIL) 25 MG tablet TAKE 1 TABLET (25 MG) BY MOUTH DAILY  3     phenol (PHENOL EZ SWABS) 89 % SWAB swab Externally apply 1 each topically DURING SURGERY for 1 dose 3 each 0     triamcinolone (KENALOG) 0.1 % ointment Apply sparingly to affected area three times daily for 14 days. 30 g 0     order for DME  "Equipment being ordered: wound care supplies  [facility] DUODERM  Extra Thin Dressing - 1 1/4\" x 1 1/2\", Spots, oval  [id number]  Wound measurements  3 cm x 3 cm x 0.1 cm lesions with wound area of 1.5 x 1.5 that has slight drainage, full thickness located on the right sacral region 1 Box 3     BD ROSE U/F 32G X 4 MM insulin pen needle USE ONCE DAILY AS DIRECTED 100 each 11     cephALEXin (KEFLEX) 500 MG capsule Take 1 capsule (500 mg) by mouth 4 times daily START THREE (3) DAYS BEFORE SURGERY 68 capsule 0     BASAGLAR 100 UNIT/ML injection Inject 26 Units Subcutaneous daily 30 mL 11     Urea 20 % CREA cream Apply topically as needed 85 g 6     Urea 20 % CREA cream Apply topically daily To affected toes 85 g 11     ammonium lactate (LAC-HYDRIN) 12 % cream Apply topically 2 times daily as needed for dry skin 385 g 3     baclofen (LIORESAL) 10 MG tablet TAKE 2 TABLETS BY MOUTH THREE TIMES DAILY 180 tablet 8     clindamycin (CLEOCIN) 150 MG capsule OPEN 1 CAP AND PLACE IN 1 LITER OF NORMAL SALINE & MIX. IRRIGATE WITH 20 ML EACH NOSTRIL 4 TIMES PER DAY (Patient taking differently: 4 times daily as needed OPEN 1 CAP AND PLACE IN 1 LITER OF NORMAL SALINE & MIX. IRRIGATE WITH 20 ML EACH NOSTRIL 4 TIMES PER DAY) 10 capsule 3     order for DME   Location:sacral ulcer unknown etiology possible sheer    measurement: 2.5 cm x 2 cm x 0.1 cm Unlikely to be a  pressure ulcer but if it is it's a stg 2,  partial thickness, low eexudate    Wound supplies :  5 each Comfeel  Plus Transparent 3530 2 x 2 3/4''  5x7 cm  [facility]     wound care orders: cleans wound, dry thoroughly  and replace Comfeel. Leave in place for 7 days for 5 weeks 5 each 3     propranolol (INDERAL) 20 MG tablet Take 1 tablet (20 mg) by mouth 3 times daily 270 tablet 3     sertraline (ZOLOFT) 100 MG tablet Take 2 tablets (200 mg) by mouth daily 180 tablet 1     mupirocin (BACTROBAN) 2 % ointment Apply to anterior nares BID X 2 month supply 2 g 3     promethazine " (PHENERGAN) 25 MG tablet Take 1 tablet (25 mg) by mouth every 6 hours as needed for nausea 40 tablet 11     order for DME Equipment being ordered: one donut for sitting 1 Device 0     sodium chloride 0.9 % SOLN Irrigate 20 cc each nostril QID X 2 month supply 24064 mL 3     blood glucose monitoring (NO BRAND SPECIFIED) test strip Use to test blood sugars one time daily or as directed    Accucheck Richards plus test strips 1 Box 5     albuterol (PROAIR HFA, PROVENTIL HFA, VENTOLIN HFA) 108 (90 BASE) MCG/ACT inhaler Inhale 2 puffs into the lungs every 6 hours as needed for shortness of breath / dyspnea or wheezing Ventolin or other covered brand 3 Inhaler 3     Syringe, Disposable, 25 ML MISC Syringe to be used for nasal irrigation 1 each 3     potassium chloride SA (POTASSIUM CHLORIDE) 20 MEQ tablet Take 3 tablets (60 mEq) by mouth daily 180 tablet 3     omeprazole (PRILOSEC) 40 MG capsule Take 1 capsule (40 mg) by mouth daily Take 30-60 minutes before a meal. (Patient taking differently: Take 40 mg by mouth daily as needed Take 30-60 minutes before a meal.) 90 capsule 3     Alcohol Swabs (ALCOHOL PADS) 70 % PADS 1 Box 4 times daily 100 each 3     COMPRESSION STOCKINGS Knee high compression socks 30-40-mm 2 each 1     blood glucose monitoring (FREESTYLE) lancets Use to test blood sugars 2 times daily or as directed. 1 Box 3     Blood Glucose Monitoring Suppl (ACURA BLOOD GLUCOSE METER) W/DEVICE KIT 1 Dose 2 times daily 1 kit 1     ORDER FOR DME Equipment being ordered: BP cuff 1 Device 0     ACE/ARB NOT PRESCRIBED, INTENTIONAL, ACE & ARB not prescribed due to Allergy              Immunization History:     Immunization History   Administered Date(s) Administered     Influenza (IIV3) PF 09/19/2011, 10/10/2016     Influenza Vaccine IM 3yrs+ 4 Valent IIV4 10/05/2015     Pneumo Conj 13-V (2010&after) 06/26/2015     Pneumococcal 23 valent 03/31/2014     TDAP Vaccine (Adacel) 06/01/2007            Allergies:     Allergies  "  Allergen Reactions     Lisinopril Anaphylaxis     Swollen tongue; inability to swallow; drooling; hives; swollen face, neck, angioedema     Acetaminophen      Hx of cirrhosis      Amlodipine      Swelling, hives, possible angioedema       Keflex [Cephalexin]      Morphine      Hypotension     Bactrim [Sulfamethoxazole W/Trimethoprim] Rash     Levaquin Swelling and Rash     Swelling in lip and tongue felt thick            Physical Exam:   Vital signs:  /80 (BP Location: Right arm, Patient Position: Sitting, Cuff Size: Adult Large)  Pulse 54  Temp 97.7  F (36.5  C) (Oral)  Ht 1.803 m (5' 11\")  Wt 99.6 kg (219 lb 8 oz)  SpO2 95%  BMI 30.61 kg/m2    Physical Examination:  GENERAL:  well-developed, well-nourished, seated in no acute distress.  HEENT:  Head is normocephalic, atraumatic   EYES:  Eyes have anicteric sclerae without conjunctival injection   NECK:  Supple. No cervical lymphadenopathy  SKIN:  No acute rashes.    NEUROLOGIC:  Grossly nonfocal. Active x4 extremities  MS: Foot in a brace, did not unwrap - patient reported that it was fine and just needed nail management.         Laboratory Data:     Metabolic Studies   Sodium   Date Value Ref Range Status   12/20/2017 137 133 - 144 mmol/L Final   10/03/2017 141 133 - 144 mmol/L Final     Potassium   Date Value Ref Range Status   12/20/2017 3.1 (L) 3.4 - 5.3 mmol/L Final   10/03/2017 3.8 3.4 - 5.3 mmol/L Final     Chloride   Date Value Ref Range Status   12/20/2017 102 94 - 109 mmol/L Final   10/03/2017 106 94 - 109 mmol/L Final     Carbon Dioxide   Date Value Ref Range Status   12/20/2017 28 20 - 32 mmol/L Final   10/03/2017 29 20 - 32 mmol/L Final     Anion Gap   Date Value Ref Range Status   12/20/2017 7 3 - 14 mmol/L Final   10/03/2017 6 3 - 14 mmol/L Final     Urea Nitrogen   Date Value Ref Range Status   12/20/2017 7 7 - 30 mg/dL Final   10/03/2017 6 (L) 7 - 30 mg/dL Final     Creatinine   Date Value Ref Range Status   12/20/2017 0.68 0.66 - " 1.25 mg/dL Final   10/03/2017 0.64 (L) 0.66 - 1.25 mg/dL Final     GFR Estimate   Date Value Ref Range Status   12/20/2017 >90 >60 mL/min/1.7m2 Final     Comment:     Non  GFR Calc   10/03/2017 >90 >60 mL/min/1.7m2 Final     Comment:     Non  GFR Calc     Glucose   Date Value Ref Range Status   12/20/2017 185 (H) 70 - 99 mg/dL Final   10/03/2017 138 (H) 70 - 99 mg/dL Final     Hemoglobin A1C   Date Value Ref Range Status   12/20/2017 6.4 (H) 4.3 - 6.0 % Final     Calcium   Date Value Ref Range Status   12/20/2017 8.7 8.5 - 10.1 mg/dL Final   10/03/2017 8.2 (L) 8.5 - 10.1 mg/dL Final     Phosphorus   Date Value Ref Range Status   05/21/2013 2.9 2.5 - 4.5 mg/dL Final   08/26/2009 3.4 2.5 - 4.5 mg/dL Final     Magnesium   Date Value Ref Range Status   07/11/2015 1.9 1.6 - 2.3 mg/dL Final   10/06/2014 1.6 1.6 - 2.3 mg/dL Final     Lactic Acid   Date Value Ref Range Status   11/29/2014 2.0 0.7 - 2.1 mmol/L Final     CRP Inflammation   Date Value Ref Range Status   08/10/2015 6.4 0.0 - 8.0 mg/L Final   11/29/2014 21.9 (H) 0.0 - 8.0 mg/L Final   10/06/2014 9.9 (H) 0.0 - 8.0 mg/L Final   10/01/2014 13.9 (H) 0.0 - 8.0 mg/L Final   09/28/2014 16.4 (H) 0.0 - 8.0 mg/L Final   07/29/2014 19.6 (H) 0.0 - 8.0 mg/L Final       Inflammatory Markers   CRP Inflammation   Date Value Ref Range Status   08/10/2015 6.4 0.0 - 8.0 mg/L Final   11/29/2014 21.9 (H) 0.0 - 8.0 mg/L Final   10/06/2014 9.9 (H) 0.0 - 8.0 mg/L Final   10/01/2014 13.9 (H) 0.0 - 8.0 mg/L Final   09/28/2014 16.4 (H) 0.0 - 8.0 mg/L Final   07/29/2014 19.6 (H) 0.0 - 8.0 mg/L Final       Hepatic Studies    Bilirubin Total   Date Value Ref Range Status   10/03/2017 0.7 0.2 - 1.3 mg/dL Final   04/07/2017 0.5 0.2 - 1.3 mg/dL Final     Alkaline Phosphatase   Date Value Ref Range Status   10/03/2017 136 40 - 150 U/L Final   04/07/2017 124 40 - 150 U/L Final     Albumin   Date Value Ref Range Status   10/03/2017 4.0 3.4 - 5.0 g/dL Final    04/07/2017 3.8 3.4 - 5.0 g/dL Final     AST   Date Value Ref Range Status   10/03/2017 19 0 - 45 U/L Final   04/07/2017 19 0 - 45 U/L Final     ALT   Date Value Ref Range Status   10/03/2017 30 0 - 70 U/L Final   04/07/2017 24 0 - 70 U/L Final       Hematology Studies      WBC   Date Value Ref Range Status   12/20/2017 5.6 4.0 - 11.0 10e9/L Final   10/03/2017 8.1 4.0 - 11.0 10e9/L Final     Absolute Neutrophil   Date Value Ref Range Status   12/20/2017 3.3 1.6 - 8.3 10e9/L Final   08/23/2016 4.1 1.6 - 8.3 10e9/L Final     Absolute Lymphocytes   Date Value Ref Range Status   12/20/2017 1.5 0.8 - 5.3 10e9/L Final   08/23/2016 2.4 0.8 - 5.3 10e9/L Final     Absolute Monocytes   Date Value Ref Range Status   12/20/2017 0.5 0.0 - 1.3 10e9/L Final   08/23/2016 0.7 0.0 - 1.3 10e9/L Final     Absolute Eosinophils   Date Value Ref Range Status   12/20/2017 0.3 0.0 - 0.7 10e9/L Final   08/23/2016 0.4 0.0 - 0.7 10e9/L Final     Hemoglobin   Date Value Ref Range Status   12/20/2017 12.5 (L) 13.3 - 17.7 g/dL Final   10/03/2017 13.4 13.3 - 17.7 g/dL Final     Hematocrit   Date Value Ref Range Status   12/20/2017 39.0 (L) 40.0 - 53.0 % Final   10/03/2017 40.2 40.0 - 53.0 % Final     Platelet Count   Date Value Ref Range Status   12/20/2017 158 150 - 450 10e9/L Final   10/03/2017 194 150 - 450 10e9/L Final     Imaging:  No results found for this or any previous visit (from the past 744 hour(s)).          Jenn Dickens MD

## 2018-01-25 NOTE — PROGRESS NOTES
[facility]  Dr. Jenn Dickens, [facility], [address]  [address] [place] [address]   Patient:  Erwin Aguilar, Date of birth 1960, Medical record number [id number]  Date of Visit:  01/25/2018         Assessment and Recommendations:     No problem-specific Assessment & Plan notes found for this encounter.      Jenn Dickens MD  Division of Infectious Diseases and International Medicine  [phone]         History of Infectious Disease Illness:         Past Medical and Surgical History:     Past Medical History:   Diagnosis Date     Actinic keratosis     aldara     Anxiety      Arrhythmia     prolonged QT     Asthma      Bleeding disorder (H) 3-27-16     Cancer (H)     squamous cell skin CA     Cauda equina spinal cord injury (H)      Chronic pain     right leg     Chronic pain syndrome      Chronic sinusitis 5-1-16     Diastasis recti      Esophageal reflux      Esophageal varices in cirrhosis (H) 4/1/2014    Hospitalized for UGI blee 3/28/14, endoscopy revealed bleeding varices.     Essential hypertension, benign      Generalized anxiety disorder      H/O dental abscess      Hernia      Liver disease 3-     MEDICAL HISTORY OF -     right leg numbness due to back injury     MEDICAL HISTORY OF -     alcoholism     MEDICAL HISTORY OF -     squamous cell     Mild depression (H)      Mixed hyperlipidemia      Nasal polyps 5-1-16     Other chronic pain      Seasonal allergic conjunctivitis      Type II or unspecified type diabetes mellitus without mention of complication, not stated as uncontrolled      Ulcer (H)     R foot     Umbilical hernia without mention of obstruction or gangrene 10/2012     Unspecified site of spinal cord injury without evidence of spinal bone injury        Past Surgical History:   Procedure Laterality Date     C APPENDECTOMY  1974     COLONOSCOPY N/A 5/12/2016    Procedure: COMBINED COLONOSCOPY, SINGLE OR  MULTIPLE BIOPSY/POLYPECTOMY BY BIOPSY;  Surgeon: Ana Paula Urbina MD;  Location:  GI     ENDOSCOPY UPPER, COLONOSCOPY, COMBINED  10/19/2011    Procedure:COMBINED ENDOSCOPY UPPER, COLONOSCOPY; Upper Endoscopy, Colonoscopy with Polypectomy x4; Surgeon:AMBAR RODRÍGUEZ; Location:[facility] OR     ENT SURGERY  1-2016    Ongoing since then     ESOPHAGOSCOPY, GASTROSCOPY, DUODENOSCOPY (EGD), COMBINED  3/28/2014    Procedure: COMBINED ESOPHAGOSCOPY, GASTROSCOPY, DUODENOSCOPY (EGD);  EGD, Gastric Biopsies, Esophageal Banding;  Surgeon: Reyna Tovar MD;  [facility]     ESOPHAGOSCOPY, GASTROSCOPY, DUODENOSCOPY (EGD), COMBINED  6/9/2014    Procedure: COMBINED ESOPHAGOSCOPY, GASTROSCOPY, DUODENOSCOPY (EGD);  Surgeon: Curtis Mendez MD;  Location:  GI     ESOPHAGOSCOPY, GASTROSCOPY, DUODENOSCOPY (EGD), COMBINED  7/24/2014    Procedure: COMBINED ESOPHAGOSCOPY, GASTROSCOPY, DUODENOSCOPY (EGD);  Surgeon: Gerard Samaniego MD;  Location:  OR     ESOPHAGOSCOPY, GASTROSCOPY, DUODENOSCOPY (EGD), COMBINED N/A 10/31/2014    Procedure: COMBINED ESOPHAGOSCOPY, GASTROSCOPY, DUODENOSCOPY (EGD);  Surgeon: Gerard Samaniego MD;  Location:  OR     ESOPHAGOSCOPY, GASTROSCOPY, DUODENOSCOPY (EGD), COMBINED N/A 5/12/2016    Procedure: COMBINED ESOPHAGOSCOPY, GASTROSCOPY, DUODENOSCOPY (EGD);  Surgeon: Ana Paula Urbina MD;  Location:  GI     HCL SQUAMOUS CELL CARCINOMA AG  10/07    left forearm     HERNIORRHAPHY UMBILICAL  11/8/2012    Procedure: HERNIORRHAPHY UMBILICAL;  Open Umbilical Hernia Repair With Mesh ;  Surgeon: Chase Nicholson MD;  Location: [facility] OR     neuro stimulator  2010     SURGICAL HISTORY OF -   1/02    abcess tooth     SURGICAL HISTORY OF -   1999    L4-5 laminectomy, cauda equina syndrome     SURGICAL HISTORY OF -   +    herniated disk repair     TONSILLECTOMY  10 1964     TRANSPOSITION ULNAR NERVE (ELBOW)      right           Family History:     Family History   Problem  "Relation Age of Onset     CANCER Mother      lung     CANCER Father      esophogeal, GERD     DIABETES Brother      amputation, Type 1     DIABETES Brother      Cancer - colorectal No family hx of            Social History:     Social History   Substance Use Topics     Smoking status: Former Smoker     Packs/day: 0.50     Years: 1.00     Types: Cigarettes     Start date: 10/13/1983     Quit date: 6/9/1984     Smokeless tobacco: Never Used     Alcohol use No      Comment: a pint of alcohol / day - last drink was 3/28/14     Social History     Social History Narrative            Review of Systems:   CONSTITUTIONAL:  No fevers or chills  EYES: negative for icterus  ENT:  negative for hearing loss, tinnitus, sore throat  RESPIRATORY:  negative for cough, sputum or dyspnea  CARDIOVASCULAR:  negative for chest pain, palpitations  GASTROINTESTINAL:  negative for nausea, vomiting, diarrhea or constipation  GENITOURINARY:  negative for dysuria  HEME:  No easy bruising  INTEGUMENT:  negative for rash or pruritus  NEURO:  Negative for headache         Current Medications:     Current Outpatient Prescriptions   Medication Sig Dispense Refill     hydrochlorothiazide (HYDRODIURIL) 25 MG tablet TAKE 1 TABLET (25 MG) BY MOUTH DAILY  3     phenol (PHENOL EZ SWABS) 89 % SWAB swab Externally apply 1 each topically DURING SURGERY for 1 dose 3 each 0     triamcinolone (KENALOG) 0.1 % ointment Apply sparingly to affected area three times daily for 14 days. 30 g 0     order for DME Equipment being ordered: wound care supplies  CONVATEC DUODERM  Extra Thin Dressing - 1 1/4\" x 1 1/2\", Spots, oval  [id number]  Wound measurements  3 cm x 3 cm x 0.1 cm lesions with wound area of 1.5 x 1.5 that has slight drainage, full thickness located on the right sacral region 1 Box 3     BD ROSE U/F 32G X 4 MM insulin pen needle USE ONCE DAILY AS DIRECTED 100 each 11     cephALEXin (KEFLEX) 500 MG capsule Take 1 capsule (500 mg) by mouth 4 times daily START " THREE (3) DAYS BEFORE SURGERY 68 capsule 0     BASAGLAR 100 UNIT/ML injection Inject 26 Units Subcutaneous daily 30 mL 11     Urea 20 % CREA cream Apply topically as needed 85 g 6     Urea 20 % CREA cream Apply topically daily To affected toes 85 g 11     ammonium lactate (LAC-HYDRIN) 12 % cream Apply topically 2 times daily as needed for dry skin 385 g 3     baclofen (LIORESAL) 10 MG tablet TAKE 2 TABLETS BY MOUTH THREE TIMES DAILY 180 tablet 8     clindamycin (CLEOCIN) 150 MG capsule OPEN 1 CAP AND PLACE IN 1 LITER OF NORMAL SALINE & MIX. IRRIGATE WITH 20 ML EACH NOSTRIL 4 TIMES PER DAY (Patient taking differently: 4 times daily as needed OPEN 1 CAP AND PLACE IN 1 LITER OF NORMAL SALINE & MIX. IRRIGATE WITH 20 ML EACH NOSTRIL 4 TIMES PER DAY) 10 capsule 3     order for DME   Location:sacral ulcer unknown etiology possible sheer    measurement: 2.5 cm x 2 cm x 0.1 cm Unlikely to be a  pressure ulcer but if it is it's a stg 2,  partial thickness, low eexudate    Wound supplies :  5 each Comfeel  Plus Transparent 3530 2 x 2 3/4''  5x7 cm  [facility]     wound care orders: cleans wound, dry thoroughly  and replace Comfeel. Leave in place for 7 days for 5 weeks 5 each 3     propranolol (INDERAL) 20 MG tablet Take 1 tablet (20 mg) by mouth 3 times daily 270 tablet 3     sertraline (ZOLOFT) 100 MG tablet Take 2 tablets (200 mg) by mouth daily 180 tablet 1     mupirocin (BACTROBAN) 2 % ointment Apply to anterior nares BID X 2 month supply 2 g 3     promethazine (PHENERGAN) 25 MG tablet Take 1 tablet (25 mg) by mouth every 6 hours as needed for nausea 40 tablet 11     order for DME Equipment being ordered: one donut for sitting 1 Device 0     sodium chloride 0.9 % SOLN Irrigate 20 cc each nostril QID X 2 month supply 28444 mL 3     blood glucose monitoring (NO BRAND SPECIFIED) test strip Use to test blood sugars one time daily or as directed    Accucheck Richards plus test strips 1 Box 5     albuterol (PROAIR HFA, PROVENTIL  HFA, VENTOLIN HFA) 108 (90 BASE) MCG/ACT inhaler Inhale 2 puffs into the lungs every 6 hours as needed for shortness of breath / dyspnea or wheezing Ventolin or other covered brand 3 Inhaler 3     Syringe, Disposable, 25 ML MISC Syringe to be used for nasal irrigation 1 each 3     potassium chloride SA (POTASSIUM CHLORIDE) 20 MEQ tablet Take 3 tablets (60 mEq) by mouth daily 180 tablet 3     omeprazole (PRILOSEC) 40 MG capsule Take 1 capsule (40 mg) by mouth daily Take 30-60 minutes before a meal. (Patient taking differently: Take 40 mg by mouth daily as needed Take 30-60 minutes before a meal.) 90 capsule 3     Alcohol Swabs (ALCOHOL PADS) 70 % PADS 1 Box 4 times daily 100 each 3     COMPRESSION STOCKINGS Knee high compression socks 30-40-mm 2 each 1     blood glucose monitoring (FREESTYLE) lancets Use to test blood sugars 2 times daily or as directed. 1 Box 3     Blood Glucose Monitoring Suppl (ACURA BLOOD GLUCOSE METER) W/DEVICE KIT 1 Dose 2 times daily 1 kit 1     ORDER FOR DME Equipment being ordered: BP cuff 1 Device 0     ACE/ARB NOT PRESCRIBED, INTENTIONAL, ACE & ARB not prescribed due to Allergy              Immunization History:     Immunization History   Administered Date(s) Administered     Influenza (IIV3) PF 09/19/2011, 10/10/2016     Influenza Vaccine IM 3yrs+ 4 Valent IIV4 10/05/2015     Pneumo Conj 13-V (2010&after) 06/26/2015     Pneumococcal 23 valent 03/31/2014     TDAP Vaccine (Adacel) 06/01/2007            Allergies:     Allergies   Allergen Reactions     Lisinopril Anaphylaxis     Swollen tongue; inability to swallow; drooling; hives; swollen face, neck, angioedema     Acetaminophen      Hx of cirrhosis      Amlodipine      Swelling, hives, possible angioedema       Keflex [Cephalexin]      Morphine      Hypotension     Bactrim [Sulfamethoxazole W/Trimethoprim] Rash     Levaquin Swelling and Rash     Swelling in lip and tongue felt thick            Physical Exam:   Vital signs:  /80 (BP  "Location: Right arm, Patient Position: Sitting, Cuff Size: Adult Large)  Pulse 54  Temp 97.7  F (36.5  C) (Oral)  Ht 1.803 m (5' 11\")  Wt 99.6 kg (219 lb 8 oz)  SpO2 95%  BMI 30.61 kg/m2    Physical Examination:  GENERAL:  well-developed, well-nourished, seated in no acute distress.  HEENT:  Head is normocephalic, atraumatic   EYES:  Eyes have anicteric sclerae without conjunctival injection   ENT:  Oropharynx is moist without exudates or ulcers. Tongue is midline  NECK:  Supple. No cervical lymphadenopathy  LUNGS:  Clear to auscultation bilateral.   CARDIOVASCULAR:  Regular rate and rhythm with no murmurs, gallops or rubs.  ABDOMEN:  Normal bowel sounds, soft, nontender. No appreciable hepatosplenomegaly.  SKIN:  No acute rashes.    NEUROLOGIC:  Grossly nonfocal. Active x4 extremities         Laboratory Data:     Metabolic Studies   Sodium   Date Value Ref Range Status   12/20/2017 137 133 - 144 mmol/L Final   10/03/2017 141 133 - 144 mmol/L Final     Potassium   Date Value Ref Range Status   12/20/2017 3.1 (L) 3.4 - 5.3 mmol/L Final   10/03/2017 3.8 3.4 - 5.3 mmol/L Final     Chloride   Date Value Ref Range Status   12/20/2017 102 94 - 109 mmol/L Final   10/03/2017 106 94 - 109 mmol/L Final     Carbon Dioxide   Date Value Ref Range Status   12/20/2017 28 20 - 32 mmol/L Final   10/03/2017 29 20 - 32 mmol/L Final     Anion Gap   Date Value Ref Range Status   12/20/2017 7 3 - 14 mmol/L Final   10/03/2017 6 3 - 14 mmol/L Final     Urea Nitrogen   Date Value Ref Range Status   12/20/2017 7 7 - 30 mg/dL Final   10/03/2017 6 (L) 7 - 30 mg/dL Final     Creatinine   Date Value Ref Range Status   12/20/2017 0.68 0.66 - 1.25 mg/dL Final   10/03/2017 0.64 (L) 0.66 - 1.25 mg/dL Final     GFR Estimate   Date Value Ref Range Status   12/20/2017 >90 >60 mL/min/1.7m2 Final     Comment:     Non  GFR Calc   10/03/2017 >90 >60 mL/min/1.7m2 Final     Comment:     Non  GFR Calc     Glucose   Date " Value Ref Range Status   12/20/2017 185 (H) 70 - 99 mg/dL Final   10/03/2017 138 (H) 70 - 99 mg/dL Final     Hemoglobin A1C   Date Value Ref Range Status   12/20/2017 6.4 (H) 4.3 - 6.0 % Final     Calcium   Date Value Ref Range Status   12/20/2017 8.7 8.5 - 10.1 mg/dL Final   10/03/2017 8.2 (L) 8.5 - 10.1 mg/dL Final     Phosphorus   Date Value Ref Range Status   05/21/2013 2.9 2.5 - 4.5 mg/dL Final   08/26/2009 3.4 2.5 - 4.5 mg/dL Final     Magnesium   Date Value Ref Range Status   07/11/2015 1.9 1.6 - 2.3 mg/dL Final   10/06/2014 1.6 1.6 - 2.3 mg/dL Final     Lactic Acid   Date Value Ref Range Status   11/29/2014 2.0 0.7 - 2.1 mmol/L Final     CRP Inflammation   Date Value Ref Range Status   08/10/2015 6.4 0.0 - 8.0 mg/L Final   11/29/2014 21.9 (H) 0.0 - 8.0 mg/L Final   10/06/2014 9.9 (H) 0.0 - 8.0 mg/L Final   10/01/2014 13.9 (H) 0.0 - 8.0 mg/L Final   09/28/2014 16.4 (H) 0.0 - 8.0 mg/L Final   07/29/2014 19.6 (H) 0.0 - 8.0 mg/L Final       Inflammatory Markers   CRP Inflammation   Date Value Ref Range Status   08/10/2015 6.4 0.0 - 8.0 mg/L Final   11/29/2014 21.9 (H) 0.0 - 8.0 mg/L Final   10/06/2014 9.9 (H) 0.0 - 8.0 mg/L Final   10/01/2014 13.9 (H) 0.0 - 8.0 mg/L Final   09/28/2014 16.4 (H) 0.0 - 8.0 mg/L Final   07/29/2014 19.6 (H) 0.0 - 8.0 mg/L Final       Hepatic Studies    Bilirubin Total   Date Value Ref Range Status   10/03/2017 0.7 0.2 - 1.3 mg/dL Final   04/07/2017 0.5 0.2 - 1.3 mg/dL Final     Alkaline Phosphatase   Date Value Ref Range Status   10/03/2017 136 40 - 150 U/L Final   04/07/2017 124 40 - 150 U/L Final     Albumin   Date Value Ref Range Status   10/03/2017 4.0 3.4 - 5.0 g/dL Final   04/07/2017 3.8 3.4 - 5.0 g/dL Final     AST   Date Value Ref Range Status   10/03/2017 19 0 - 45 U/L Final   04/07/2017 19 0 - 45 U/L Final     ALT   Date Value Ref Range Status   10/03/2017 30 0 - 70 U/L Final   04/07/2017 24 0 - 70 U/L Final       Hematology Studies      WBC   Date Value Ref Range Status    12/20/2017 5.6 4.0 - 11.0 10e9/L Final   10/03/2017 8.1 4.0 - 11.0 10e9/L Final     Absolute Neutrophil   Date Value Ref Range Status   12/20/2017 3.3 1.6 - 8.3 10e9/L Final   08/23/2016 4.1 1.6 - 8.3 10e9/L Final     Absolute Lymphocytes   Date Value Ref Range Status   12/20/2017 1.5 0.8 - 5.3 10e9/L Final   08/23/2016 2.4 0.8 - 5.3 10e9/L Final     Absolute Monocytes   Date Value Ref Range Status   12/20/2017 0.5 0.0 - 1.3 10e9/L Final   08/23/2016 0.7 0.0 - 1.3 10e9/L Final     Absolute Eosinophils   Date Value Ref Range Status   12/20/2017 0.3 0.0 - 0.7 10e9/L Final   08/23/2016 0.4 0.0 - 0.7 10e9/L Final     Hemoglobin   Date Value Ref Range Status   12/20/2017 12.5 (L) 13.3 - 17.7 g/dL Final   10/03/2017 13.4 13.3 - 17.7 g/dL Final     Hematocrit   Date Value Ref Range Status   12/20/2017 39.0 (L) 40.0 - 53.0 % Final   10/03/2017 40.2 40.0 - 53.0 % Final     Platelet Count   Date Value Ref Range Status   12/20/2017 158 150 - 450 10e9/L Final   10/03/2017 194 150 - 450 10e9/L Final       Imaging:  No results found for this or any previous visit (from the past 744 hour(s)).

## 2018-01-25 NOTE — PROGRESS NOTES
[facility]  Dr. Jenn Dickens, [facility], Floor 3  [address] [place] [address]   Patient:  Erwin Aguilar, Date of birth 1960, Medical record number [id number]  Date of Visit:  01/25/2018         Assessment and Recommendations:     Recurrent Cellulitis  Mr. Aguilar has had a history of cellulitis at several sites including his lower extremities and his face.  We discussed at some length today the importance of close management of underlying conditions that increase his risk of cellulitis.  This includes DM management and evaluation for underlying vascular pathology. His primary care physician has been proactive with this.  We discussed that in patients with recurrent cellulitis that penicillin prophylaxis has been studied and is effective in reducing episodes.  He was interested in this and I prescribed him this today.  He has a Keflex allergy that he cannot recall, but tolerated Amoxicillin in the past. I also counseled him today on what to do in the event of an allergic response to the penicillin.  I advised that he have diphenhydramine at home which he should take if he develops an urticarial rash or breathing issues.  I advise with either of these reactions that he stop the medication and contact us.  In the case of difficult breathing or throat/mouth swelling, he should seek emergency care.     History of a overlying cellulitis at the site of a stimulator device  Routine perioperative antibiotics are indicated for this procedure. He does not have a history of colonization with drug resistant organisms.  Luckily he had no device infection in the past. If Mr. Aguilar does develop any surgical site issues, I am happy to help with management.    Need for Influenza Vaccination  We will do this today.     Jenn Dickens MD  Division of Infectious Diseases and International Medicine  [phone]    I spent more than 30 minutes today in face  to face time managing the care of Erwin Aguilar.  Over 50% of my time on the unit was spent counseling the patient and/or coordinating care regarding services listed in this note.           History of Infectious Disease Illness:   Mr. Aguilar is a 57 year old man who presents for evaluation of recurrent soft tissue infections.  This includes both cellulitis is the lower extremity.  He has also occasionally gotten episodes on his face.  He was referred to me today because he previously developed cellulitis over the site of a stimulator device and he is due to have this replaced.  Of note, he did not have an actual device infection so if it was indeed an infectious wound issues, it was very superficial.  He has been prescribed Keflex by his surgical team.  However, he has a charted Keflex allergy.  Mr. Aguilar is a very poor historian with reference to this allergy.  He cannot recall getting the drug, a reaction to it and how it got in the chart.  I do see that he received Augmentin in the past for a sinus infection which he tolerated without any issues. He reports that currently, he has not been having significant health issues.  He is currently being managed by podiatry for an ingrown toenail.         Past Medical and Surgical History:     Past Medical History:   Diagnosis Date     Actinic keratosis     aldara     Anxiety      Arrhythmia     prolonged QT     Asthma      Bleeding disorder (H) 3-27-16     Cancer (H)     squamous cell skin CA     Cauda equina spinal cord injury (H)      Chronic pain     right leg     Chronic pain syndrome      Chronic sinusitis 5-1-16     Diastasis recti      Esophageal reflux      Esophageal varices in cirrhosis (H) 4/1/2014    Hospitalized for UGI blee 3/28/14, endoscopy revealed bleeding varices.     Essential hypertension, benign      Generalized anxiety disorder      H/O dental abscess      Hernia      Liver disease 3-     MEDICAL HISTORY OF -     right leg numbness  due to back injury     MEDICAL HISTORY OF -     alcoholism     MEDICAL HISTORY OF -     squamous cell     Mild depression (H)      Mixed hyperlipidemia      Nasal polyps 5-1-16     Other chronic pain      Seasonal allergic conjunctivitis      Type II or unspecified type diabetes mellitus without mention of complication, not stated as uncontrolled      Ulcer (H)     R foot     Umbilical hernia without mention of obstruction or gangrene 10/2012     Unspecified site of spinal cord injury without evidence of spinal bone injury        Past Surgical History:   Procedure Laterality Date     C APPENDECTOMY  1974     COLONOSCOPY N/A 5/12/2016    Procedure: COMBINED COLONOSCOPY, SINGLE OR MULTIPLE BIOPSY/POLYPECTOMY BY BIOPSY;  Surgeon: Ana Paula Urbina MD;  Location:  GI     ENDOSCOPY UPPER, COLONOSCOPY, COMBINED  10/19/2011    Procedure:COMBINED ENDOSCOPY UPPER, COLONOSCOPY; Upper Endoscopy, Colonoscopy with Polypectomy x4; Surgeon:AMBAR RODRÍGUEZ; Location: OR     ENT SURGERY  1-2016    Ongoing since then     ESOPHAGOSCOPY, GASTROSCOPY, DUODENOSCOPY (EGD), COMBINED  3/28/2014    Procedure: COMBINED ESOPHAGOSCOPY, GASTROSCOPY, DUODENOSCOPY (EGD);  EGD, Gastric Biopsies, Esophageal Banding;  Surgeon: Reyna Tovar MD;  Location:  OR     ESOPHAGOSCOPY, GASTROSCOPY, DUODENOSCOPY (EGD), COMBINED  6/9/2014    Procedure: COMBINED ESOPHAGOSCOPY, GASTROSCOPY, DUODENOSCOPY (EGD);  Surgeon: Curtis Mendez MD;  Location:  GI     ESOPHAGOSCOPY, GASTROSCOPY, DUODENOSCOPY (EGD), COMBINED  7/24/2014    Procedure: COMBINED ESOPHAGOSCOPY, GASTROSCOPY, DUODENOSCOPY (EGD);  Surgeon: Gerard Samaniego MD;  Location:  OR     ESOPHAGOSCOPY, GASTROSCOPY, DUODENOSCOPY (EGD), COMBINED N/A 10/31/2014    Procedure: COMBINED ESOPHAGOSCOPY, GASTROSCOPY, DUODENOSCOPY (EGD);  Surgeon: Gerard Samaniego MD;  Location:  OR     ESOPHAGOSCOPY, GASTROSCOPY, DUODENOSCOPY (EGD), COMBINED N/A 5/12/2016    Procedure:  COMBINED ESOPHAGOSCOPY, GASTROSCOPY, DUODENOSCOPY (EGD);  Surgeon: Ana Paula Urbina MD;  Location: [facility] GI     HCL SQUAMOUS CELL CARCINOMA AG  10/07    left forearm     HERNIORRHAPHY UMBILICAL  11/8/2012    Procedure: HERNIORRHAPHY UMBILICAL;  Open Umbilical Hernia Repair With Mesh ;  Surgeon: Chase Nicholson MD;  Location: [facility] OR     neuro stimulator  2010     SURGICAL HISTORY OF -   1/02    abcess tooth     SURGICAL HISTORY OF -   1999    L4-5 laminectomy, cauda equina syndrome     SURGICAL HISTORY OF -   +    herniated disk repair     TONSILLECTOMY  10 1964     TRANSPOSITION ULNAR NERVE (ELBOW)      right           Family History:     Family History   Problem Relation Age of Onset     CANCER Mother      lung     CANCER Father      esophogeal, GERD     DIABETES Brother      amputation, Type 1     DIABETES Brother      Cancer - colorectal No family hx of            Social History:     Social History   Substance Use Topics     Smoking status: Former Smoker     Packs/day: 0.50     Years: 1.00     Types: Cigarettes     Start date: 10/13/1983     Quit date: 6/9/1984     Smokeless tobacco: Never Used     Alcohol use No      Comment: a pint of alcohol / day - last drink was 3/28/14     Social History     Social History Narrative            Review of Systems:   Answers for HPI/ROS submitted by the patient on 1/23/2018   General Symptoms: No  Skin Symptoms: Yes  HENT Symptoms: Yes  EYE SYMPTOMS: No  HEART SYMPTOMS: No  LUNG SYMPTOMS: No  INTESTINAL SYMPTOMS: No  URINARY SYMPTOMS: No  REPRODUCTIVE SYMPTOMS: No  SKELETAL SYMPTOMS: No  BLOOD SYMPTOMS: No  NERVOUS SYSTEM SYMPTOMS: No  MENTAL HEALTH SYMPTOMS: No  Changes in hair: No  Changes in moles/birth marks: No  Itching: No  Changes in nails: No  Acne: No  Change in facial hair: No  Warts: Yes  Non-healing sores: No  Scarring: Yes  Color changes of hands/feet in cold : Yes  Sun sensitivity: No  Skin thickening: No  Ear pain: No  Ear discharge: No  Hearing  "loss: No  Tinnitus: No  Nosebleeds: No  Congestion: No  Sinus pain: No  Trouble swallowing: No   Voice hoarseness: No  Mouth sores: No  Sore throat: No  Tooth pain: Yes  Gum tenderness: Yes  Bleeding gums: Yes  Change in taste: No  Change in sense of smell: No  Dry mouth: No  Hearing aid used: No  Neck lump: No           Current Medications:     Current Outpatient Prescriptions   Medication Sig Dispense Refill     hydrochlorothiazide (HYDRODIURIL) 25 MG tablet TAKE 1 TABLET (25 MG) BY MOUTH DAILY  3     phenol (PHENOL EZ SWABS) 89 % SWAB swab Externally apply 1 each topically DURING SURGERY for 1 dose 3 each 0     triamcinolone (KENALOG) 0.1 % ointment Apply sparingly to affected area three times daily for 14 days. 30 g 0     order for DME Equipment being ordered: wound care supplies  CONVATEC DUODERM  Extra Thin Dressing - 1 1/4\" x 1 1/2\", Spots, oval  [id number]  Wound measurements  3 cm x 3 cm x 0.1 cm lesions with wound area of 1.5 x 1.5 that has slight drainage, full thickness located on the right sacral region 1 Box 3     BD ROSE U/F 32G X 4 MM insulin pen needle USE ONCE DAILY AS DIRECTED 100 each 11     cephALEXin (KEFLEX) 500 MG capsule Take 1 capsule (500 mg) by mouth 4 times daily START THREE (3) DAYS BEFORE SURGERY 68 capsule 0     BASAGLAR 100 UNIT/ML injection Inject 26 Units Subcutaneous daily 30 mL 11     Urea 20 % CREA cream Apply topically as needed 85 g 6     Urea 20 % CREA cream Apply topically daily To affected toes 85 g 11     ammonium lactate (LAC-HYDRIN) 12 % cream Apply topically 2 times daily as needed for dry skin 385 g 3     baclofen (LIORESAL) 10 MG tablet TAKE 2 TABLETS BY MOUTH THREE TIMES DAILY 180 tablet 8     clindamycin (CLEOCIN) 150 MG capsule OPEN 1 CAP AND PLACE IN 1 LITER OF NORMAL SALINE & MIX. IRRIGATE WITH 20 ML EACH NOSTRIL 4 TIMES PER DAY (Patient taking differently: 4 times daily as needed OPEN 1 CAP AND PLACE IN 1 LITER OF NORMAL SALINE & MIX. IRRIGATE WITH 20 ML EACH " NOSTRIL 4 TIMES PER DAY) 10 capsule 3     order for DME   Location:sacral ulcer unknown etiology possible sheer    measurement: 2.5 cm x 2 cm x 0.1 cm Unlikely to be a  pressure ulcer but if it is it's a stg 2,  partial thickness, low eexudate    Wound supplies :  5 each Comfeel  Plus Transparent 3530 2 x 2 3/4''  5x7 cm  [facility]     wound care orders: cleans wound, dry thoroughly  and replace Comfeel. Leave in place for 7 days for 5 weeks 5 each 3     propranolol (INDERAL) 20 MG tablet Take 1 tablet (20 mg) by mouth 3 times daily 270 tablet 3     sertraline (ZOLOFT) 100 MG tablet Take 2 tablets (200 mg) by mouth daily 180 tablet 1     mupirocin (BACTROBAN) 2 % ointment Apply to anterior nares BID X 2 month supply 2 g 3     promethazine (PHENERGAN) 25 MG tablet Take 1 tablet (25 mg) by mouth every 6 hours as needed for nausea 40 tablet 11     order for DME Equipment being ordered: one donut for sitting 1 Device 0     sodium chloride 0.9 % SOLN Irrigate 20 cc each nostril QID X 2 month supply 35200 mL 3     blood glucose monitoring (NO BRAND SPECIFIED) test strip Use to test blood sugars one time daily or as directed    Accucheck Richards plus test strips 1 Box 5     albuterol (PROAIR HFA, PROVENTIL HFA, VENTOLIN HFA) 108 (90 BASE) MCG/ACT inhaler Inhale 2 puffs into the lungs every 6 hours as needed for shortness of breath / dyspnea or wheezing Ventolin or other covered brand 3 Inhaler 3     Syringe, Disposable, 25 ML MISC Syringe to be used for nasal irrigation 1 each 3     potassium chloride SA (POTASSIUM CHLORIDE) 20 MEQ tablet Take 3 tablets (60 mEq) by mouth daily 180 tablet 3     omeprazole (PRILOSEC) 40 MG capsule Take 1 capsule (40 mg) by mouth daily Take 30-60 minutes before a meal. (Patient taking differently: Take 40 mg by mouth daily as needed Take 30-60 minutes before a meal.) 90 capsule 3     Alcohol Swabs (ALCOHOL PADS) 70 % PADS 1 Box 4 times daily 100 each 3     COMPRESSION STOCKINGS Knee high  "compression socks 30-40-mm 2 each 1     blood glucose monitoring (FREESTYLE) lancets Use to test blood sugars 2 times daily or as directed. 1 Box 3     Blood Glucose Monitoring Suppl (ACURA BLOOD GLUCOSE METER) W/DEVICE KIT 1 Dose 2 times daily 1 kit 1     ORDER FOR DME Equipment being ordered: BP cuff 1 Device 0     ACE/ARB NOT PRESCRIBED, INTENTIONAL, ACE & ARB not prescribed due to Allergy              Immunization History:     Immunization History   Administered Date(s) Administered     Influenza (IIV3) PF 09/19/2011, 10/10/2016     Influenza Vaccine IM 3yrs+ 4 Valent IIV4 10/05/2015     Pneumo Conj 13-V (2010&after) 06/26/2015     Pneumococcal 23 valent 03/31/2014     TDAP Vaccine (Adacel) 06/01/2007            Allergies:     Allergies   Allergen Reactions     Lisinopril Anaphylaxis     Swollen tongue; inability to swallow; drooling; hives; swollen face, neck, angioedema     Acetaminophen      Hx of cirrhosis      Amlodipine      Swelling, hives, possible angioedema       Keflex [Cephalexin]      Morphine      Hypotension     Bactrim [Sulfamethoxazole W/Trimethoprim] Rash     Levaquin Swelling and Rash     Swelling in lip and tongue felt thick            Physical Exam:   Vital signs:  /80 (BP Location: Right arm, Patient Position: Sitting, Cuff Size: Adult Large)  Pulse 54  Temp 97.7  F (36.5  C) (Oral)  Ht 1.803 m (5' 11\")  Wt 99.6 kg (219 lb 8 oz)  SpO2 95%  BMI 30.61 kg/m2    Physical Examination:  GENERAL:  well-developed, well-nourished, seated in no acute distress.  HEENT:  Head is normocephalic, atraumatic   EYES:  Eyes have anicteric sclerae without conjunctival injection   NECK:  Supple. No cervical lymphadenopathy  SKIN:  No acute rashes.    NEUROLOGIC:  Grossly nonfocal. Active x4 extremities  MS: Foot in a brace, did not unwrap - patient reported that it was fine and just needed nail management.         Laboratory Data:     Metabolic Studies   Sodium   Date Value Ref Range Status "   12/20/2017 137 133 - 144 mmol/L Final   10/03/2017 141 133 - 144 mmol/L Final     Potassium   Date Value Ref Range Status   12/20/2017 3.1 (L) 3.4 - 5.3 mmol/L Final   10/03/2017 3.8 3.4 - 5.3 mmol/L Final     Chloride   Date Value Ref Range Status   12/20/2017 102 94 - 109 mmol/L Final   10/03/2017 106 94 - 109 mmol/L Final     Carbon Dioxide   Date Value Ref Range Status   12/20/2017 28 20 - 32 mmol/L Final   10/03/2017 29 20 - 32 mmol/L Final     Anion Gap   Date Value Ref Range Status   12/20/2017 7 3 - 14 mmol/L Final   10/03/2017 6 3 - 14 mmol/L Final     Urea Nitrogen   Date Value Ref Range Status   12/20/2017 7 7 - 30 mg/dL Final   10/03/2017 6 (L) 7 - 30 mg/dL Final     Creatinine   Date Value Ref Range Status   12/20/2017 0.68 0.66 - 1.25 mg/dL Final   10/03/2017 0.64 (L) 0.66 - 1.25 mg/dL Final     GFR Estimate   Date Value Ref Range Status   12/20/2017 >90 >60 mL/min/1.7m2 Final     Comment:     Non  GFR Calc   10/03/2017 >90 >60 mL/min/1.7m2 Final     Comment:     Non  GFR Calc     Glucose   Date Value Ref Range Status   12/20/2017 185 (H) 70 - 99 mg/dL Final   10/03/2017 138 (H) 70 - 99 mg/dL Final     Hemoglobin A1C   Date Value Ref Range Status   12/20/2017 6.4 (H) 4.3 - 6.0 % Final     Calcium   Date Value Ref Range Status   12/20/2017 8.7 8.5 - 10.1 mg/dL Final   10/03/2017 8.2 (L) 8.5 - 10.1 mg/dL Final     Phosphorus   Date Value Ref Range Status   05/21/2013 2.9 2.5 - 4.5 mg/dL Final   08/26/2009 3.4 2.5 - 4.5 mg/dL Final     Magnesium   Date Value Ref Range Status   07/11/2015 1.9 1.6 - 2.3 mg/dL Final   10/06/2014 1.6 1.6 - 2.3 mg/dL Final     Lactic Acid   Date Value Ref Range Status   11/29/2014 2.0 0.7 - 2.1 mmol/L Final     CRP Inflammation   Date Value Ref Range Status   08/10/2015 6.4 0.0 - 8.0 mg/L Final   11/29/2014 21.9 (H) 0.0 - 8.0 mg/L Final   10/06/2014 9.9 (H) 0.0 - 8.0 mg/L Final   10/01/2014 13.9 (H) 0.0 - 8.0 mg/L Final   09/28/2014 16.4 (H)  0.0 - 8.0 mg/L Final   07/29/2014 19.6 (H) 0.0 - 8.0 mg/L Final       Inflammatory Markers   CRP Inflammation   Date Value Ref Range Status   08/10/2015 6.4 0.0 - 8.0 mg/L Final   11/29/2014 21.9 (H) 0.0 - 8.0 mg/L Final   10/06/2014 9.9 (H) 0.0 - 8.0 mg/L Final   10/01/2014 13.9 (H) 0.0 - 8.0 mg/L Final   09/28/2014 16.4 (H) 0.0 - 8.0 mg/L Final   07/29/2014 19.6 (H) 0.0 - 8.0 mg/L Final       Hepatic Studies    Bilirubin Total   Date Value Ref Range Status   10/03/2017 0.7 0.2 - 1.3 mg/dL Final   04/07/2017 0.5 0.2 - 1.3 mg/dL Final     Alkaline Phosphatase   Date Value Ref Range Status   10/03/2017 136 40 - 150 U/L Final   04/07/2017 124 40 - 150 U/L Final     Albumin   Date Value Ref Range Status   10/03/2017 4.0 3.4 - 5.0 g/dL Final   04/07/2017 3.8 3.4 - 5.0 g/dL Final     AST   Date Value Ref Range Status   10/03/2017 19 0 - 45 U/L Final   04/07/2017 19 0 - 45 U/L Final     ALT   Date Value Ref Range Status   10/03/2017 30 0 - 70 U/L Final   04/07/2017 24 0 - 70 U/L Final       Hematology Studies      WBC   Date Value Ref Range Status   12/20/2017 5.6 4.0 - 11.0 10e9/L Final   10/03/2017 8.1 4.0 - 11.0 10e9/L Final     Absolute Neutrophil   Date Value Ref Range Status   12/20/2017 3.3 1.6 - 8.3 10e9/L Final   08/23/2016 4.1 1.6 - 8.3 10e9/L Final     Absolute Lymphocytes   Date Value Ref Range Status   12/20/2017 1.5 0.8 - 5.3 10e9/L Final   08/23/2016 2.4 0.8 - 5.3 10e9/L Final     Absolute Monocytes   Date Value Ref Range Status   12/20/2017 0.5 0.0 - 1.3 10e9/L Final   08/23/2016 0.7 0.0 - 1.3 10e9/L Final     Absolute Eosinophils   Date Value Ref Range Status   12/20/2017 0.3 0.0 - 0.7 10e9/L Final   08/23/2016 0.4 0.0 - 0.7 10e9/L Final     Hemoglobin   Date Value Ref Range Status   12/20/2017 12.5 (L) 13.3 - 17.7 g/dL Final   10/03/2017 13.4 13.3 - 17.7 g/dL Final     Hematocrit   Date Value Ref Range Status   12/20/2017 39.0 (L) 40.0 - 53.0 % Final   10/03/2017 40.2 40.0 - 53.0 % Final     Platelet  Count   Date Value Ref Range Status   12/20/2017 158 150 - 450 10e9/L Final   10/03/2017 194 150 - 450 10e9/L Final     Imaging:  No results found for this or any previous visit (from the past 744 hour(s)).

## 2018-01-29 DIAGNOSIS — R11.0 NAUSEA WITHOUT VOMITING: ICD-10-CM

## 2018-01-30 NOTE — TELEPHONE ENCOUNTER
"Requested Prescriptions   Pending Prescriptions Disp Refills     promethazine (PHENERGAN) 25 MG tablet [Pharmacy Med Name: PROMETHAZINE 25 MG TABLET]    Last Written Prescription Date:  1/24/2017  Last Fill Quantity: 40 tablet,  # refills: 11   Last Office Visit: 5/9/2017   Future Office Visit:      40 tablet 11     Sig: TAKE 1 TABLET (25 MG) BY MOUTH EVERY 6 HOURS AS NEEDED FOR NAUSEA     Antivertigo/Antiemetic Agents Passed    1/29/2018 10:07 PM       Passed - Recent or future visit with authorizing provider's specialty    Patient had office visit in the last year or has a visit in the next 30 days with authorizing provider.  See \"Patient Info\" tab in inbasket, or \"Choose Columns\" in Meds & Orders section of the refill encounter.          Passed - Patient is 18 years of age or older          "

## 2018-02-01 ENCOUNTER — OFFICE VISIT (OUTPATIENT)
Dept: ENDOCRINOLOGY | Facility: CLINIC | Age: 58
End: 2018-02-01
Payer: MEDICARE

## 2018-02-01 VITALS
BODY MASS INDEX: 31.3 KG/M2 | HEIGHT: 71 IN | DIASTOLIC BLOOD PRESSURE: 89 MMHG | WEIGHT: 223.6 LBS | SYSTOLIC BLOOD PRESSURE: 162 MMHG | HEART RATE: 60 BPM

## 2018-02-01 DIAGNOSIS — K08.9 POOR DENTITION: ICD-10-CM

## 2018-02-01 DIAGNOSIS — E11.42 TYPE 2 DIABETES MELLITUS WITH DIABETIC POLYNEUROPATHY, WITH LONG-TERM CURRENT USE OF INSULIN (H): Primary | ICD-10-CM

## 2018-02-01 DIAGNOSIS — Z79.4 TYPE 2 DIABETES MELLITUS WITH DIABETIC POLYNEUROPATHY, WITH LONG-TERM CURRENT USE OF INSULIN (H): Primary | ICD-10-CM

## 2018-02-01 LAB — HBA1C MFR BLD: 5.6 % (ref 4.3–6)

## 2018-02-01 RX ORDER — PROMETHAZINE HYDROCHLORIDE 25 MG/1
TABLET ORAL
Qty: 40 TABLET | Refills: 11 | Status: SHIPPED | OUTPATIENT
Start: 2018-02-01 | End: 2018-07-04

## 2018-02-01 ASSESSMENT — PAIN SCALES - GENERAL: PAINLEVEL: EXTREME PAIN (8)

## 2018-02-01 NOTE — LETTER
2/1/2018       RE: Erwin Aguilar  [address]  [place] [address]     Dear Colleague,    Thank you for referring your patient, Erwin Aguilar, to the [facility] ENDOCRINOLOGY at [facility]. Please see a copy of my visit note below.    Endocrinology Clinic Visit    Chief Complaint: Consult (DIABETES TYPE 2 CONSULTATION )       Information obtained from:Patient    HPI: Erwin Aguilar is a 57 year old male with history of liver cirrhosis due to alcoholism, spinal cord injury and chronic pain syndrome who is seen in consultation at [facility] request for diabetes control prior to spinal cord stimulator device placement.     Patient was diagnosed with DM2 in 2004. It is unclear if he has any complications of diabetes so far. Last eye exam was many years ago. Proteinuria was not checked recently, creatinine was 0.68 in 12/2017. He has loss of sensation in both lower extremities and right foot drop but that is most likely related to his spinal cord injury and following back surgeries. For diabetes, he was initially prescribed oral agents but he never used them. Now he has been on glargine for many years. He periodically checks his sugars at home. He did not have meter with him today but he showed his sugars in his phone from the last few days. All sugars were <150, BG was 124 this morning. He mentioned that he does not remember having a BG >200 for a long time. Rare hypoglycemia which usually happens when he has not eaten for a long time. HbA1c was 6.4% in 12/2017 and is 5.6% today. He was evaluated for spinal cord device removal and replacement last month. A1C was 6.4% which was an increase from 5.8% about 7 months ago. There was a concern of infection in the past and he was referred to endocrinology for optimization of his diabetes before surgery.    Medications:   Allergies   Allergen Reactions     Lisinopril Anaphylaxis     Swollen tongue; inability to  "swallow; drooling; hives; swollen face, neck, angioedema     Acetaminophen      Hx of cirrhosis      Amlodipine      Swelling, hives, possible angioedema       Keflex [Cephalexin]      Patient reports that he has taken augmentin and tolerated in in 2/2017     Morphine      Hypotension     Bactrim [Sulfamethoxazole W/Trimethoprim] Rash     Levaquin Swelling and Rash     Swelling in lip and tongue felt thick       Current Outpatient Prescriptions   Medication Sig Dispense Refill     hydrochlorothiazide (HYDRODIURIL) 25 MG tablet TAKE 1 TABLET (25 MG) BY MOUTH DAILY  3     phenol (PHENOL EZ SWABS) 89 % SWAB swab Externally apply 1 each topically DURING SURGERY for 1 dose 3 each 0     penicillin V potassium (VEETID) 250 MG tablet Take 1 tablet (250 mg) by mouth 2 times daily 60 tablet 11     triamcinolone (KENALOG) 0.1 % ointment Apply sparingly to affected area three times daily for 14 days. 30 g 0     order for DME Equipment being ordered: wound care supplies  CONVATEC DUODERM  Extra Thin Dressing - 1 1/4\" x 1 1/2\", Spots, oval  [id number]  Wound measurements  3 cm x 3 cm x 0.1 cm lesions with wound area of 1.5 x 1.5 that has slight drainage, full thickness located on the right sacral region 1 Box 3     BD ROSE U/F 32G X 4 MM insulin pen needle USE ONCE DAILY AS DIRECTED 100 each 11     cephALEXin (KEFLEX) 500 MG capsule Take 1 capsule (500 mg) by mouth 4 times daily START THREE (3) DAYS BEFORE SURGERY 68 capsule 0     BASAGLAR 100 UNIT/ML injection Inject 26 Units Subcutaneous daily 30 mL 11     Urea 20 % CREA cream Apply topically as needed 85 g 6     Urea 20 % CREA cream Apply topically daily To affected toes 85 g 11     ammonium lactate (LAC-HYDRIN) 12 % cream Apply topically 2 times daily as needed for dry skin 385 g 3     baclofen (LIORESAL) 10 MG tablet TAKE 2 TABLETS BY MOUTH THREE TIMES DAILY 180 tablet 8     clindamycin (CLEOCIN) 150 MG capsule OPEN 1 CAP AND PLACE IN 1 LITER OF NORMAL SALINE & MIX. IRRIGATE " WITH 20 ML EACH NOSTRIL 4 TIMES PER DAY (Patient taking differently: 4 times daily as needed OPEN 1 CAP AND PLACE IN 1 LITER OF NORMAL SALINE & MIX. IRRIGATE WITH 20 ML EACH NOSTRIL 4 TIMES PER DAY) 10 capsule 3     order for DME   Location:sacral ulcer unknown etiology possible sheer    measurement: 2.5 cm x 2 cm x 0.1 cm Unlikely to be a  pressure ulcer but if it is it's a stg 2,  partial thickness, low eexudate    Wound supplies :  5 each Comfeel  Plus Transparent 3530 2 x 2 3/4''  5x7 cm  [facility]     wound care orders: cleans wound, dry thoroughly  and replace Comfeel. Leave in place for 7 days for 5 weeks 5 each 3     propranolol (INDERAL) 20 MG tablet Take 1 tablet (20 mg) by mouth 3 times daily 270 tablet 3     sertraline (ZOLOFT) 100 MG tablet Take 2 tablets (200 mg) by mouth daily 180 tablet 1     mupirocin (BACTROBAN) 2 % ointment Apply to anterior nares BID X 2 month supply 2 g 3     promethazine (PHENERGAN) 25 MG tablet Take 1 tablet (25 mg) by mouth every 6 hours as needed for nausea 40 tablet 11     order for DME Equipment being ordered: one donut for sitting 1 Device 0     sodium chloride 0.9 % SOLN Irrigate 20 cc each nostril QID X 2 month supply 17077 mL 3     blood glucose monitoring (NO BRAND SPECIFIED) test strip Use to test blood sugars one time daily or as directed    Accucheck Richards plus test strips 1 Box 5     albuterol (PROAIR HFA, PROVENTIL HFA, VENTOLIN HFA) 108 (90 BASE) MCG/ACT inhaler Inhale 2 puffs into the lungs every 6 hours as needed for shortness of breath / dyspnea or wheezing Ventolin or other covered brand 3 Inhaler 3     Syringe, Disposable, 25 ML MISC Syringe to be used for nasal irrigation 1 each 3     potassium chloride SA (POTASSIUM CHLORIDE) 20 MEQ tablet Take 3 tablets (60 mEq) by mouth daily 180 tablet 3     omeprazole (PRILOSEC) 40 MG capsule Take 1 capsule (40 mg) by mouth daily Take 30-60 minutes before a meal. (Patient taking differently: Take 40 mg by mouth daily  as needed Take 30-60 minutes before a meal.) 90 capsule 3     Alcohol Swabs (ALCOHOL PADS) 70 % PADS 1 Box 4 times daily 100 each 3     COMPRESSION STOCKINGS Knee high compression socks 30-40-mm 2 each 1     blood glucose monitoring (FREESTYLE) lancets Use to test blood sugars 2 times daily or as directed. 1 Box 3     Blood Glucose Monitoring Suppl (ACURA BLOOD GLUCOSE METER) W/DEVICE KIT 1 Dose 2 times daily 1 kit 1     ORDER FOR DME Equipment being ordered: BP cuff 1 Device 0     ACE/ARB NOT PRESCRIBED, INTENTIONAL, ACE & ARB not prescribed due to Allergy         Review of Systems:  - Chronic pain in both lower extremities since spinal cord injury and surgery  - Loss of sensation in both LE up to knees  - No nausea/vomiting  - Poor oral intake and weight loss due to loss of multiple teeth/dental issues  - No polyuria or polydipsia  - Rest of ROS negative or as stated above    Past Medical History:   Diagnosis Date     Actinic keratosis     aldara     Anxiety      Arrhythmia     prolonged QT     Asthma      Bleeding disorder (H) 3-27-16     Cancer (H)     squamous cell skin CA     Cauda equina spinal cord injury (H)      Chronic pain     right leg     Chronic pain syndrome      Chronic sinusitis 5-1-16     Diastasis recti      Esophageal reflux      Esophageal varices in cirrhosis (H) 4/1/2014    Hospitalized for UGI blee 3/28/14, endoscopy revealed bleeding varices.     Essential hypertension, benign      Generalized anxiety disorder      H/O dental abscess      Hernia      Liver disease 3-     MEDICAL HISTORY OF -     right leg numbness due to back injury     MEDICAL HISTORY OF -     alcoholism     MEDICAL HISTORY OF -     squamous cell     Mild depression (H)      Mixed hyperlipidemia      Nasal polyps 5-1-16     Other chronic pain      Seasonal allergic conjunctivitis      Type II or unspecified type diabetes mellitus without mention of complication, not stated as uncontrolled      Ulcer (H)     R  foot     Umbilical hernia without mention of obstruction or gangrene 10/2012     Unspecified site of spinal cord injury without evidence of spinal bone injury        Past Surgical History:   Procedure Laterality Date     C APPENDECTOMY  1974     COLONOSCOPY N/A 5/12/2016    Procedure: COMBINED COLONOSCOPY, SINGLE OR MULTIPLE BIOPSY/POLYPECTOMY BY BIOPSY;  Surgeon: Ana Paula Urbina MD;  Location:  GI     ENDOSCOPY UPPER, COLONOSCOPY, COMBINED  10/19/2011    Procedure:COMBINED ENDOSCOPY UPPER, COLONOSCOPY; Upper Endoscopy, Colonoscopy with Polypectomy x4; Surgeon:AMBAR RODRÍGUEZ; Location: OR     ENT SURGERY  1-2016    Ongoing since then     ESOPHAGOSCOPY, GASTROSCOPY, DUODENOSCOPY (EGD), COMBINED  3/28/2014    Procedure: COMBINED ESOPHAGOSCOPY, GASTROSCOPY, DUODENOSCOPY (EGD);  EGD, Gastric Biopsies, Esophageal Banding;  Surgeon: Reyna Tovar MD;  Location:  OR     ESOPHAGOSCOPY, GASTROSCOPY, DUODENOSCOPY (EGD), COMBINED  6/9/2014    Procedure: COMBINED ESOPHAGOSCOPY, GASTROSCOPY, DUODENOSCOPY (EGD);  Surgeon: Curtis Mendez MD;  Location:  GI     ESOPHAGOSCOPY, GASTROSCOPY, DUODENOSCOPY (EGD), COMBINED  7/24/2014    Procedure: COMBINED ESOPHAGOSCOPY, GASTROSCOPY, DUODENOSCOPY (EGD);  Surgeon: Gerard Samaniego MD;  Location:  OR     ESOPHAGOSCOPY, GASTROSCOPY, DUODENOSCOPY (EGD), COMBINED N/A 10/31/2014    Procedure: COMBINED ESOPHAGOSCOPY, GASTROSCOPY, DUODENOSCOPY (EGD);  Surgeon: Gerard Samaniego MD;  Location:  OR     ESOPHAGOSCOPY, GASTROSCOPY, DUODENOSCOPY (EGD), COMBINED N/A 5/12/2016    Procedure: COMBINED ESOPHAGOSCOPY, GASTROSCOPY, DUODENOSCOPY (EGD);  Surgeon: Ana Paula Urbina MD;  Location:  GI     HCL SQUAMOUS CELL CARCINOMA AG  10/07    left forearm     HERNIORRHAPHY UMBILICAL  11/8/2012    Procedure: HERNIORRHAPHY UMBILICAL;  Open Umbilical Hernia Repair With Mesh ;  Surgeon: Chase Nicholson MD;  Location:  OR     neuro stimulator   "2010     SURGICAL HISTORY OF -   1/02    abcess tooth     SURGICAL HISTORY OF -   1999    L4-5 laminectomy, cauda equina syndrome     SURGICAL HISTORY OF -   +    herniated disk repair     TONSILLECTOMY  10 1964     TRANSPOSITION ULNAR NERVE (ELBOW)      right       Family History   Problem Relation Age of Onset     CANCER Mother      lung     CANCER Father      esophogeal, GERD     DIABETES Brother      amputation, Type 1     DIABETES Brother      Cancer - colorectal No family hx of        Social History     Social History     Marital status: Single     Spouse name: N/A     Number of children: 0     Years of education: N/A     Occupational History     sales Unemployed     Social History Main Topics     Smoking status: Former Smoker     Packs/day: 0.50     Years: 1.00     Types: Cigarettes     Start date: 10/13/1983     Quit date: 6/9/1984     Smokeless tobacco: Never Used     Alcohol use No      Comment: a pint of alcohol / day - last drink was 3/28/14     Drug use: 2.00 per week     Special: Marijuana      Comment: marijuana - occasional     Sexual activity: Not Currently     Partners: Female     Other Topics Concern     Parent/Sibling W/ Cabg, Mi Or Angioplasty Before 65f 55m? No     Social History Narrative       Physical Examination:  /89  Pulse 60  Ht 1.803 m (5' 11\")  Wt 101.4 kg (223 lb 9.6 oz)  BMI 31.19 kg/m2  Constitutional: Appears well,  NAD   EYES: Anicteric, PERRL, normal extra-ocular movements, no lid lag or retraction   HENT: AT/NC, Mucous membranes moist. Oropharynx is clear, multiple missing teeth/poor dental hygiene   Neck: No adenopathy. No thyromegaly   Cardiovascular: RRR, S1 and S2 normal  Pulmonary/Chest: CTAB. No wheezing or rales   Abdominal: +BS. Soft, Non tender to palpation  Neurological: Alert. CNII-XII intact. Muscle strength 5/5. 2+ DTR  Extremities: No clubbing, cyanosis or edema. Absent touch sensation up to knees in both LE  Skin: normal texture, color and " temp  Lymphatic: no cervical lymphadenopathy.  Psychological: appropriate mood and affect     Labs/Imaging:   Lab Results   Component Value Date    A1C 6.4 12/20/2017    A1C 5.8 05/15/2017    A1C 5.5 10/10/2016    A1C 6.4 05/10/2016    A1C 5.7 08/04/2015        A1C  5.6      02/01/2018    TSH   Date Value Ref Range Status   10/05/2015 1.08 0.40 - 4.00 mU/L Final   04/26/2013 0.69 0.4 - 5.0 mU/L Final   12/13/2010 0.80 0.4 - 5.0 mU/L Final   11/03/2006 0.60 0.4 - 5.0 mU/L Final       Creatinine   Date Value Ref Range Status   12/20/2017 0.68 0.66 - 1.25 mg/dL Final   ]    Recent Labs   Lab Test  05/15/17   0905  12/11/15   0854  07/03/15   0910  02/06/15   0955   CHOL  238*  180  220*  215*   HDL  43  42  33*  35*   LDL  173*  123*  166*  160*   TRIG  109  74  105  98   CHOLHDLRATIO   --    --   6.7*  6.1*       Assessment/Treatment Plan:      1. Type 2 Diabetes Mellitus: Patient is a known diabetic for almost 15 years now. He is on basal insulin only for diabetes control. He showed us his BG values from the last week which were all below 150,  this morning. His A1C was 6.4% about a month ago and is 5.6% today. This suggests that his diabetes is very well controlled. From diabetes standpoint, he should be okay to proceed with the surgery.    - Continue blood glucose monitoring.  - Continue current dose of glargine.  - His diabetes is well controlled and he does not need any further optimization prior to surgery at this point.    2. Hypertension: Patient takes propranolol and HCTZ for control. He showed us his home BP readings which were mostly in the reasonable range. His BP readings are usually high during clinic visits. He reports severe pain, contributing towards higher BP values. We advised him to closely monitor his BP at home and if remains persistently elevated to contact his PCP for adjustment of therapy.     3. Dental issues: As detailed below.      - RTC as needed.      Patient was seen and discussed  with Dr Mccabe.    Estela Walker  Endocrinology Fellow      Attending addendum:    Pt was seen & evaluated with endocrinology fellow & plan is as outlined in this note, with the following additional recommendations:    Patient had very poor dentition/dental caries/missing teeth.    Referral of patient to [facility] for dental care was made since he has no resources for dental care. He was given the following information:  [facility] address:  [address] [place] [address], [place], [place]  Phone: [phone]  Patient was told that Dr. Mccabe is referring him for much needed dental care to prevent infectious complications pre-operatively. He actually refused to get his teeth addressed until after he has the back surgery. Risks and benefits of potential non-compliance with the recommended plan of care were explained to patient, including infection, sepsis, death, the need for re-operation. Our recommendation included: coverage of gram negative anaerobes with expanded antibiotic coverage besides PCN - to include changing to Augmentin or Clindamycin. Patient refused our recommendations and said he would address his teeth post-operatively. We told him his care for his teeth could be virtually free, in terms of the extractions, which would lower his risk of infection pre-operatively, should he need oral intubation for general anesthesia.    ESME MCCABE MD, MPH  Endocrinologist      Again, thank you for allowing me to participate in the care of your patient.      Sincerely,    Estela Walker MD

## 2018-02-01 NOTE — NURSING NOTE
"Chief Complaint   Patient presents with     Consult     DIABETES TYPE 2 CONSULTATION        Initial /89  Pulse 60  Ht 1.803 m (5' 11\")  Wt 101.4 kg (223 lb 9.6 oz)  BMI 31.19 kg/m2 Estimated body mass index is 31.19 kg/(m^2) as calculated from the following:    Height as of this encounter: 1.803 m (5' 11\").    Weight as of this encounter: 101.4 kg (223 lb 9.6 oz).  Medication Reconciliation: completecomplete    Performed A1C test - patient tolerated well.    Cindi Galaviz, [facility]       "

## 2018-02-01 NOTE — MR AVS SNAPSHOT
After Visit Summary   2/1/2018    Erwin Aguilar    MRN: [id number]           Patient Information     Date Of Birth          1960        Visit Information        Provider Department      2/1/2018 8:30 AM Estela Walker MD [facility]         Follow-ups after your visit        Your next 10 appointments already scheduled     Feb 12, 2018 11:00 AM CST   (Arrive by 10:45 AM)   RETURN FOOT/ANKLE with Victor M Anaya DPM   [facility] ([facility])    [address]  5th Floor  [place] [id number] [phone]            Mar 06, 2018  9:00 AM CST   Lab with  LAB   [facility] Lab ([facility])    [address]  1st Floor  [place] [id number] [phone]            Mar 06, 2018 10:00 AM CST   (Arrive by 9:45 AM)   Return General Liver with MD ARLIN Mina [facility] Hepatology ([facility])    [address]  [address]  [place] [address] [phone]              Who to contact     Please call your clinic at [phone] to:    Ask questions about your health    Make or cancel appointments    Discuss your medicines    Learn about your test results    Speak to your doctor   If you have compliments or concerns about an experience at your clinic, or if you wish to file a complaint, please contact [facility] Patient Relations at [phone] or email us at [email or web address]         Additional Information About Your Visit        MyChart Information     [employer] gives you secure access to your electronic health record. If you see a primary care provider, you can also send messages to your care team and make appointments. If you have questions, please call your primary care clinic.  If you do not have a primary care provider, please call [phone] and they will assist you.      [employer] is an electronic  "gateway that provides easy, online access to your medical records. With [employer], you can request a clinic appointment, read your test results, renew a prescription or communicate with your care team.     To access your existing account, please contact your [facility] or call [phone] for assistance.        Care EveryWhere ID     This is your Care EveryWhere ID. This could be used by other organizations to access your [facility] medical records  [id number]        Your Vitals Were     Pulse Height BMI (Body Mass Index)             60 1.803 m (5' 11\") 31.19 kg/m2          Blood Pressure from Last 3 Encounters:   02/01/18 162/89   01/25/18 178/80   12/15/17 (P) 173/87    Weight from Last 3 Encounters:   02/01/18 101.4 kg (223 lb 9.6 oz)   01/25/18 99.6 kg (219 lb 8 oz)   12/15/17 99.8 kg (220 lb)              Today, you had the following     No orders found for display         Today's Medication Changes          These changes are accurate as of 2/1/18 10:02 AM.  If you have any questions, ask your nurse or doctor.               These medicines have changed or have updated prescriptions.        Dose/Directions    clindamycin 150 MG capsule   Commonly known as:  CLEOCIN   This may have changed:    - when to take this  - reasons to take this  - additional instructions   Used for:  Chronic sinusitis, unspecified location        OPEN 1 CAP AND PLACE IN 1 LITER OF NORMAL SALINE & MIX. IRRIGATE WITH 20 ML EACH NOSTRIL 4 TIMES PER DAY   Quantity:  10 capsule   Refills:  3       omeprazole 40 MG capsule   Commonly known as:  priLOSEC   This may have changed:    - when to take this  - reasons to take this  - additional instructions   Used for:  Gastroesophageal reflux disease without esophagitis        Dose:  40 mg   Take 1 capsule (40 mg) by mouth daily Take 30-60 minutes before a meal.   Quantity:  90 capsule   Refills:  3                Primary Care Provider Office Phone # Fax #    Demario " Daniel Irwin Wegener, [phone]       [address]  [facility] [address]        Equal Access to Services     LI GENAO : Marco mary garcia sen Alvarez, waninada luqjair, qawiltonta kamarissada delmy, donis suhnay leeanne. So [facility] [phone].    ATENCIÓN: Si habla español, tiene a camp disposición servicios gratuitos de asistencia lingüística. Llame al [phone].    We comply with applicable federal civil rights laws and [place] laws. We do not discriminate on the basis of race, color, national origin, age, disability, sex, sexual orientation, or gender identity.            Thank you!     Thank you for choosing [facility]  for your care. Our goal is always to provide you with excellent care. Hearing back from our patients is one way we can continue to improve our services. Please take a few minutes to complete the written survey that you may receive in the mail after your visit with us. Thank you!             Your Updated Medication List - Protect others around you: Learn how to safely use, store and throw away your medicines at www.disposemymeds.org.          This list is accurate as of 2/1/18 10:02 AM.  Always use your most recent med list.                   Brand Name Dispense Instructions for use Diagnosis    * ACE/ARB/ARNI NOT PRESCRIBED (INTENTIONAL)      ACE & ARB not prescribed due to Allergy        ACURA BLOOD GLUCOSE METER W/DEVICE Kit     1 kit    1 Dose 2 times daily    Type 2 diabetes, HbA1c goal < 7% (H)       albuterol 108 (90 BASE) MCG/ACT Inhaler    PROAIR HFA/PROVENTIL HFA/VENTOLIN HFA    3 Inhaler    Inhale 2 puffs into the lungs every 6 hours as needed for shortness of breath / dyspnea or wheezing Ventolin or other covered brand    Moderate persistent asthma without complication       Alcohol Pads 70 % Pads     100 each    1 Box 4 times daily    Type 2 diabetes, HbA1c goal < 7% (H)       ammonium lactate 12 % cream    LAC-HYDRIN    385 g     Apply topically 2 times daily as needed for dry skin    Xerosis cutis       baclofen 10 MG tablet    LIORESAL    180 tablet    TAKE 2 TABLETS BY MOUTH THREE TIMES DAILY    Muscle spasms of both lower extremities, Back muscle spasm       BASAGLAR 100 UNIT/ML injection     30 mL    Inject 26 Units Subcutaneous daily    Type 2 diabetes mellitus without complication, with long-term current use of insulin (H)       BD ROSE U/F 32G X 4 MM   Generic drug:  insulin pen needle     100 each    USE ONCE DAILY AS DIRECTED    Type 2 diabetes mellitus without complication, with long-term current use of insulin (H)       blood glucose monitoring lancets     1 Box    Use to test blood sugars 2 times daily or as directed.    Type 2 diabetes, HbA1c goal < 7% (H)       blood glucose monitoring test strip    no brand specified    1 Box    Use to test blood sugars one time daily or as directed  Accucheck Richards plus test strips    Type 2 diabetes, HbA1c goal < 7% (H)       cephALEXin 500 MG capsule    KEFLEX    68 capsule    Take 1 capsule (500 mg) by mouth 4 times daily START THREE (3) DAYS BEFORE SURGERY    Failure of spinal cord stimulator, initial encounter (H)       clindamycin 150 MG capsule    CLEOCIN    10 capsule    OPEN 1 CAP AND PLACE IN 1 LITER OF NORMAL SALINE & MIX. IRRIGATE WITH 20 ML EACH NOSTRIL 4 TIMES PER DAY    Chronic sinusitis, unspecified location       COMPRESSION STOCKINGS     2 each    Knee high compression socks 30-40-mm    Lymphedema of both lower extremities       hydrochlorothiazide 25 MG tablet    HYDRODIURIL     TAKE 1 TABLET (25 MG) BY MOUTH DAILY        mupirocin 2 % ointment    BACTROBAN    2 g    Apply to anterior nares BID X 2 month supply    Nasal lesion, Nasal vestibulitis       omeprazole 40 MG capsule    priLOSEC    90 capsule    Take 1 capsule (40 mg) by mouth daily Take 30-60 minutes before a meal.    Gastroesophageal reflux disease without esophagitis       * order for DME     1 Device     "Equipment being ordered: BP cuff    Hypertension goal BP (blood pressure) < 140/90       * order for DME     1 Device    Equipment being ordered: one donut for sitting    Pressure ulcer, stage II       * order for DME     5 each    ?Location:sacral ulcer unknown etiology possible sheer ?measurement: 2.5 cm x 2 cm x 0.1 cm Unlikely to be a  pressure ulcer but if it is it's a stg 2,  partial thickness, low eexudate  Wound supplies : 5 each Comfeel? Plus [id number] x 2 3/4''  5x7 cm  [facility]   wound care orders: cleans wound, dry thoroughly  and replace Comfeel. Leave in place for 7 days for 5 weeks    Wound, open, buttock, right, initial encounter       * order for DME     1 Box    Equipment being ordered: wound care supplies CONVATEC DUODERM  Extra Thin Dressing - 1 1/4\" x 1 1/2\", Spots, oval [id number] Wound measurements 3 cm x 3 cm x 0.1 cm lesions with wound area of 1.5 x 1.5 that has slight drainage, full thickness located on the right sacral region    Benign neoplasm of skin of trunk, except scrotum       penicillin V potassium 250 MG tablet    VEETID    60 tablet    Take 1 tablet (250 mg) by mouth 2 times daily    Recurrent cellulitis       phenol 89 % Swab swab    PHENOL EZ SWABS    3 each    Externally apply 1 each topically DURING SURGERY for 1 dose    Ingrown toenail       potassium chloride SA 20 MEQ CR tablet    KLOR-CON    180 tablet    Take 3 tablets (60 mEq) by mouth daily    Hypokalemia       promethazine 25 MG tablet    PHENERGAN    40 tablet    Take 1 tablet (25 mg) by mouth every 6 hours as needed for nausea    Nausea without vomiting       propranolol 20 MG tablet    INDERAL    270 tablet    Take 1 tablet (20 mg) by mouth 3 times daily    Esophageal varices in cirrhosis (H)       sertraline 100 MG tablet    ZOLOFT    180 tablet    Take 2 tablets (200 mg) by mouth daily    Generalized anxiety disorder       sodium chloride 0.9 % Soln     29725 mL    Irrigate 20 cc each nostril QID X 2 month " supply    Chronic maxillary sinusitis       Syringe (Disposable) 25 ML Misc     1 each    Syringe to be used for nasal irrigation    Chronic maxillary sinusitis       triamcinolone 0.1 % ointment    KENALOG    30 g    Apply sparingly to affected area three times daily for 14 days.    Eczema, unspecified type       * Urea 20 % Crea cream     85 g    Apply topically as needed    Xerosis of skin       * Urea 20 % Crea cream     85 g    Apply topically daily To affected toes    Xerosis of skin, Tyloma, Nail avulsion of toe, subsequent encounter       * Notice:  This list has 7 medication(s) that are the same as other medications prescribed for you. Read the directions carefully, and ask your doctor or other care provider to review them with you.

## 2018-02-01 NOTE — PROGRESS NOTES
Endocrinology Clinic Visit    Chief Complaint: Consult (DIABETES TYPE 2 CONSULTATION )       Information obtained from:Patient    HPI: Erwin Aguilar is a 57 year old male with history of liver cirrhosis due to alcoholism, spinal cord injury and chronic pain syndrome who is seen in consultation at Elvis Ramos's request for diabetes control prior to spinal cord stimulator device placement.     Patient was diagnosed with DM2 in 2004. It is unclear if he has any complications of diabetes so far. Last eye exam was many years ago. Proteinuria was not checked recently, creatinine was 0.68 in 12/2017. He has loss of sensation in both lower extremities and right foot drop but that is most likely related to his spinal cord injury and following back surgeries. For diabetes, he was initially prescribed oral agents but he never used them. Now he has been on glargine for many years. He periodically checks his sugars at home. He did not have meter with him today but he showed his sugars in his phone from the last few days. All sugars were <150, BG was 124 this morning. He mentioned that he does not remember having a BG >200 for a long time. Rare hypoglycemia which usually happens when he has not eaten for a long time. HbA1c was 6.4% in 12/2017 and is 5.6% today. He was evaluated for spinal cord device removal and replacement last month. A1C was 6.4% which was an increase from 5.8% about 7 months ago. There was a concern of infection in the past and he was referred to endocrinology for optimization of his diabetes before surgery.    Medications:   Allergies   Allergen Reactions     Lisinopril Anaphylaxis     Swollen tongue; inability to swallow; drooling; hives; swollen face, neck, angioedema     Acetaminophen      Hx of cirrhosis      Amlodipine      Swelling, hives, possible angioedema       Keflex [Cephalexin]      Patient reports that he has taken augmentin and tolerated in in 2/2017     Morphine      Hypotension  "    Bactrim [Sulfamethoxazole W/Trimethoprim] Rash     Levaquin Swelling and Rash     Swelling in lip and tongue felt thick       Current Outpatient Prescriptions   Medication Sig Dispense Refill     hydrochlorothiazide (HYDRODIURIL) 25 MG tablet TAKE 1 TABLET (25 MG) BY MOUTH DAILY  3     phenol (PHENOL EZ SWABS) 89 % SWAB swab Externally apply 1 each topically DURING SURGERY for 1 dose 3 each 0     penicillin V potassium (VEETID) 250 MG tablet Take 1 tablet (250 mg) by mouth 2 times daily 60 tablet 11     triamcinolone (KENALOG) 0.1 % ointment Apply sparingly to affected area three times daily for 14 days. 30 g 0     order for DME Equipment being ordered: wound care supplies  CONVATEC DUODERM  Extra Thin Dressing - 1 1/4\" x 1 1/2\", Spots, oval  [id number]  Wound measurements  3 cm x 3 cm x 0.1 cm lesions with wound area of 1.5 x 1.5 that has slight drainage, full thickness located on the right sacral region 1 Box 3     BD ROSE U/F 32G X 4 MM insulin pen needle USE ONCE DAILY AS DIRECTED 100 each 11     cephALEXin (KEFLEX) 500 MG capsule Take 1 capsule (500 mg) by mouth 4 times daily START THREE (3) DAYS BEFORE SURGERY 68 capsule 0     BASAGLAR 100 UNIT/ML injection Inject 26 Units Subcutaneous daily 30 mL 11     Urea 20 % CREA cream Apply topically as needed 85 g 6     Urea 20 % CREA cream Apply topically daily To affected toes 85 g 11     ammonium lactate (LAC-HYDRIN) 12 % cream Apply topically 2 times daily as needed for dry skin 385 g 3     baclofen (LIORESAL) 10 MG tablet TAKE 2 TABLETS BY MOUTH THREE TIMES DAILY 180 tablet 8     clindamycin (CLEOCIN) 150 MG capsule OPEN 1 CAP AND PLACE IN 1 LITER OF NORMAL SALINE & MIX. IRRIGATE WITH 20 ML EACH NOSTRIL 4 TIMES PER DAY (Patient taking differently: 4 times daily as needed OPEN 1 CAP AND PLACE IN 1 LITER OF NORMAL SALINE & MIX. IRRIGATE WITH 20 ML EACH NOSTRIL 4 TIMES PER DAY) 10 capsule 3     order for DME   Location:sacral ulcer unknown etiology possible " sheer    measurement: 2.5 cm x 2 cm x 0.1 cm Unlikely to be a  pressure ulcer but if it is it's a stg 2,  partial thickness, low eexudate    Wound supplies :  5 each Comfeel  Plus Transparent 3530 2 x 2 3/4''  5x7 cm  [facility]     wound care orders: cleans wound, dry thoroughly  and replace Comfeel. Leave in place for 7 days for 5 weeks 5 each 3     propranolol (INDERAL) 20 MG tablet Take 1 tablet (20 mg) by mouth 3 times daily 270 tablet 3     sertraline (ZOLOFT) 100 MG tablet Take 2 tablets (200 mg) by mouth daily 180 tablet 1     mupirocin (BACTROBAN) 2 % ointment Apply to anterior nares BID X 2 month supply 2 g 3     promethazine (PHENERGAN) 25 MG tablet Take 1 tablet (25 mg) by mouth every 6 hours as needed for nausea 40 tablet 11     order for DME Equipment being ordered: one donut for sitting 1 Device 0     sodium chloride 0.9 % SOLN Irrigate 20 cc each nostril QID X 2 month supply 03250 mL 3     blood glucose monitoring (NO BRAND SPECIFIED) test strip Use to test blood sugars one time daily or as directed    Accucheck Richards plus test strips 1 Box 5     albuterol (PROAIR HFA, PROVENTIL HFA, VENTOLIN HFA) 108 (90 BASE) MCG/ACT inhaler Inhale 2 puffs into the lungs every 6 hours as needed for shortness of breath / dyspnea or wheezing Ventolin or other covered brand 3 Inhaler 3     Syringe, Disposable, 25 ML MISC Syringe to be used for nasal irrigation 1 each 3     potassium chloride SA (POTASSIUM CHLORIDE) 20 MEQ tablet Take 3 tablets (60 mEq) by mouth daily 180 tablet 3     omeprazole (PRILOSEC) 40 MG capsule Take 1 capsule (40 mg) by mouth daily Take 30-60 minutes before a meal. (Patient taking differently: Take 40 mg by mouth daily as needed Take 30-60 minutes before a meal.) 90 capsule 3     Alcohol Swabs (ALCOHOL PADS) 70 % PADS 1 Box 4 times daily 100 each 3     COMPRESSION STOCKINGS Knee high compression socks 30-40-mm 2 each 1     blood glucose monitoring (FREESTYLE) lancets Use to test blood sugars 2  times daily or as directed. 1 Box 3     Blood Glucose Monitoring Suppl (ACURA BLOOD GLUCOSE METER) W/DEVICE KIT 1 Dose 2 times daily 1 kit 1     ORDER FOR DME Equipment being ordered: BP cuff 1 Device 0     ACE/ARB NOT PRESCRIBED, INTENTIONAL, ACE & ARB not prescribed due to Allergy         Review of Systems:  - Chronic pain in both lower extremities since spinal cord injury and surgery  - Loss of sensation in both LE up to knees  - No nausea/vomiting  - Poor oral intake and weight loss due to loss of multiple teeth/dental issues  - No polyuria or polydipsia  - Rest of ROS negative or as stated above    Past Medical History:   Diagnosis Date     Actinic keratosis     aldara     Anxiety      Arrhythmia     prolonged QT     Asthma      Bleeding disorder (H) 3-27-16     Cancer (H)     squamous cell skin CA     Cauda equina spinal cord injury (H)      Chronic pain     right leg     Chronic pain syndrome      Chronic sinusitis 5-1-16     Diastasis recti      Esophageal reflux      Esophageal varices in cirrhosis (H) 4/1/2014    Hospitalized for UGI blee 3/28/14, endoscopy revealed bleeding varices.     Essential hypertension, benign      Generalized anxiety disorder      H/O dental abscess      Hernia      Liver disease 3-     MEDICAL HISTORY OF -     right leg numbness due to back injury     MEDICAL HISTORY OF -     alcoholism     MEDICAL HISTORY OF -     squamous cell     Mild depression (H)      Mixed hyperlipidemia      Nasal polyps 5-1-16     Other chronic pain      Seasonal allergic conjunctivitis      Type II or unspecified type diabetes mellitus without mention of complication, not stated as uncontrolled      Ulcer (H)     R foot     Umbilical hernia without mention of obstruction or gangrene 10/2012     Unspecified site of spinal cord injury without evidence of spinal bone injury        Past Surgical History:   Procedure Laterality Date     C APPENDECTOMY  1974     COLONOSCOPY N/A 5/12/2016     Procedure: COMBINED COLONOSCOPY, SINGLE OR MULTIPLE BIOPSY/POLYPECTOMY BY BIOPSY;  Surgeon: Ana Paula Urbina MD;  Location:  GI     ENDOSCOPY UPPER, COLONOSCOPY, COMBINED  10/19/2011    Procedure:COMBINED ENDOSCOPY UPPER, COLONOSCOPY; Upper Endoscopy, Colonoscopy with Polypectomy x4; Surgeon:AMBAR RODRÍGUEZ; Location:[facility] OR     ENT SURGERY  1-2016    Ongoing since then     ESOPHAGOSCOPY, GASTROSCOPY, DUODENOSCOPY (EGD), COMBINED  3/28/2014    Procedure: COMBINED ESOPHAGOSCOPY, GASTROSCOPY, DUODENOSCOPY (EGD);  EGD, Gastric Biopsies, Esophageal Banding;  Surgeon: Reyna Tovra MD;  [facility]     ESOPHAGOSCOPY, GASTROSCOPY, DUODENOSCOPY (EGD), COMBINED  6/9/2014    Procedure: COMBINED ESOPHAGOSCOPY, GASTROSCOPY, DUODENOSCOPY (EGD);  Surgeon: Curtis Mendez MD;  Location:  GI     ESOPHAGOSCOPY, GASTROSCOPY, DUODENOSCOPY (EGD), COMBINED  7/24/2014    Procedure: COMBINED ESOPHAGOSCOPY, GASTROSCOPY, DUODENOSCOPY (EGD);  Surgeon: Gerard Samaniego MD;  Location:  OR     ESOPHAGOSCOPY, GASTROSCOPY, DUODENOSCOPY (EGD), COMBINED N/A 10/31/2014    Procedure: COMBINED ESOPHAGOSCOPY, GASTROSCOPY, DUODENOSCOPY (EGD);  Surgeon: Gerard Samaniego MD;  Location:  OR     ESOPHAGOSCOPY, GASTROSCOPY, DUODENOSCOPY (EGD), COMBINED N/A 5/12/2016    Procedure: COMBINED ESOPHAGOSCOPY, GASTROSCOPY, DUODENOSCOPY (EGD);  Surgeon: Ana Paula Ubrina MD;  [facility]     HCL SQUAMOUS CELL CARCINOMA AG  10/07    left forearm     HERNIORRHAPHY UMBILICAL  11/8/2012    Procedure: HERNIORRHAPHY UMBILICAL;  Open Umbilical Hernia Repair With Mesh ;  Surgeon: Chase Nicholson MD;  Location: [facility] OR     neuro stimulator  2010     SURGICAL HISTORY OF -   1/02    abcess tooth     SURGICAL HISTORY OF -   1999    L4-5 laminectomy, cauda equina syndrome     SURGICAL HISTORY OF -   +    herniated disk repair     TONSILLECTOMY  10 1964     TRANSPOSITION ULNAR NERVE (ELBOW)      right       Family  "History   Problem Relation Age of Onset     CANCER Mother      lung     CANCER Father      esyisselogeal, GERD     DIABETES Brother      amputation, Type 1     DIABETES Brother      Cancer - colorectal No family hx of        Social History     Social History     Marital status: Single     Spouse name: N/A     Number of children: 0     Years of education: N/A     Occupational History     sales Unemployed     Social History Main Topics     Smoking status: Former Smoker     Packs/day: 0.50     Years: 1.00     Types: Cigarettes     Start date: 10/13/1983     Quit date: 6/9/1984     Smokeless tobacco: Never Used     Alcohol use No      Comment: a pint of alcohol / day - last drink was 3/28/14     Drug use: 2.00 per week     Special: Marijuana      Comment: marijuana - occasional     Sexual activity: Not Currently     Partners: Female     Other Topics Concern     Parent/Sibling W/ Cabg, Mi Or Angioplasty Before 65f 55m? No     Social History Narrative       Physical Examination:  /89  Pulse 60  Ht 1.803 m (5' 11\")  Wt 101.4 kg (223 lb 9.6 oz)  BMI 31.19 kg/m2  Constitutional: Appears well,  NAD   EYES: Anicteric, PERRL, normal extra-ocular movements, no lid lag or retraction   HENT: AT/NC, Mucous membranes moist. Oropharynx is clear, multiple missing teeth/poor dental hygiene   Neck: No adenopathy. No thyromegaly   Cardiovascular: RRR, S1 and S2 normal  Pulmonary/Chest: CTAB. No wheezing or rales   Abdominal: +BS. Soft, Non tender to palpation  Neurological: Alert. CNII-XII intact. Muscle strength 5/5. 2+ DTR  Extremities: No clubbing, cyanosis or edema. Absent touch sensation up to knees in both LE  Skin: normal texture, color and temp  Lymphatic: no cervical lymphadenopathy.  Psychological: appropriate mood and affect     Labs/Imaging:   Lab Results   Component Value Date    A1C 6.4 12/20/2017    A1C 5.8 05/15/2017    A1C 5.5 10/10/2016    A1C 6.4 05/10/2016    A1C 5.7 08/04/2015        " A1C  5.6      02/01/2018    TSH   Date Value Ref Range Status   10/05/2015 1.08 0.40 - 4.00 mU/L Final   04/26/2013 0.69 0.4 - 5.0 mU/L Final   12/13/2010 0.80 0.4 - 5.0 mU/L Final   11/03/2006 0.60 0.4 - 5.0 mU/L Final       Creatinine   Date Value Ref Range Status   12/20/2017 0.68 0.66 - 1.25 mg/dL Final   ]    Recent Labs   Lab Test  05/15/17   0905  12/11/15   0854  07/03/15   0910  02/06/15   0955   CHOL  238*  180  220*  215*   HDL  43  42  33*  35*   LDL  173*  123*  166*  160*   TRIG  109  74  105  98   CHOLHDLRATIO   --    --   6.7*  6.1*       Assessment/Treatment Plan:      1. Type 2 Diabetes Mellitus: Patient is a known diabetic for almost 15 years now. He is on basal insulin only for diabetes control. He showed us his BG values from the last week which were all below 150,  this morning. His A1C was 6.4% about a month ago and is 5.6% today. This suggests that his diabetes is very well controlled. From diabetes standpoint, he should be okay to proceed with the surgery.    - Continue blood glucose monitoring.  - Continue current dose of glargine.  - His diabetes is well controlled and he does not need any further optimization prior to surgery at this point.    2. Hypertension: Patient takes propranolol and HCTZ for control. He showed us his home BP readings which were mostly in the reasonable range. His BP readings are usually high during clinic visits. He reports severe pain, contributing towards higher BP values. We advised him to closely monitor his BP at home and if remains persistently elevated to contact his PCP for adjustment of therapy.     3. Dental issues: As detailed below.      - RTC as needed.      Patient was seen and discussed with Dr Mccabe.    Estela Walker  Endocrinology Fellow      Attending addendum:    Pt was seen & evaluated with endocrinology fellow & plan is as outlined in this note, with the following additional recommendations:    Patient had very poor dentition/dental  caries/missing teeth.    Referral of patient to [facility] for dental care was made since he has no resources for dental care. He was given the following information:  [facility] address:  [address] [place], [place]  Phone: [phone]  Patient was told that Dr. Mccabe is referring him for much needed dental care to prevent infectious complications pre-operatively. He actually refused to get his teeth addressed until after he has the back surgery. Risks and benefits of potential non-compliance with the recommended plan of care were explained to patient, including infection, sepsis, death, the need for re-operation. Our recommendation included: coverage of gram negative anaerobes with expanded antibiotic coverage besides PCN - to include changing to Augmentin or Clindamycin. Patient refused our recommendations and said he would address his teeth post-operatively. We told him his care for his teeth could be virtually free, in terms of the extractions, which would lower his risk of infection pre-operatively, should he need oral intubation for general anesthesia.    ESME MCCABE MD, MPH  Endocrinologist

## 2018-02-02 ENCOUNTER — TELEPHONE (OUTPATIENT)
Dept: ANESTHESIOLOGY | Facility: CLINIC | Age: 58
End: 2018-02-02

## 2018-02-02 NOTE — TELEPHONE ENCOUNTER
Spoke with patient regarding scheduling a spinal cord stimulator.  I explained I have no time in February but we will be able to get this on the schedule with Dr Sauceda on March.  Once I have the time secured in March, I will be calling him.  Patient was not happy and wanted February but I explained with Dr Sauceda schedule, March is when I will be able to do this.

## 2018-02-03 ENCOUNTER — MYC MEDICAL ADVICE (OUTPATIENT)
Dept: FAMILY MEDICINE | Facility: CLINIC | Age: 58
End: 2018-02-03

## 2018-02-03 DIAGNOSIS — Z79.4 TYPE 2 DIABETES MELLITUS WITH DIABETIC POLYNEUROPATHY, WITH LONG-TERM CURRENT USE OF INSULIN (H): Primary | ICD-10-CM

## 2018-02-03 DIAGNOSIS — E11.42 TYPE 2 DIABETES MELLITUS WITH DIABETIC POLYNEUROPATHY, WITH LONG-TERM CURRENT USE OF INSULIN (H): Primary | ICD-10-CM

## 2018-02-05 ENCOUNTER — MYC MEDICAL ADVICE (OUTPATIENT)
Dept: FAMILY MEDICINE | Facility: CLINIC | Age: 58
End: 2018-02-05

## 2018-02-05 RX ORDER — LANCETS
EACH MISCELLANEOUS
Qty: 102 EACH | Refills: 3 | Status: SHIPPED | OUTPATIENT
Start: 2018-02-05 | End: 2018-02-12

## 2018-02-05 NOTE — TELEPHONE ENCOUNTER
Test strips and lancets pended.    Dr. Wegener-Please review and sign if agree.    Thank you!  FLORIN EscobarN, RN

## 2018-02-06 ENCOUNTER — HOSPITAL ENCOUNTER (OUTPATIENT)
Facility: AMBULATORY SURGERY CENTER | Age: 58
End: 2018-02-06
Attending: ANESTHESIOLOGY
Payer: MEDICARE

## 2018-02-09 DIAGNOSIS — T85.192A: Primary | ICD-10-CM

## 2018-02-09 RX ORDER — CLINDAMYCIN HCL 300 MG
300 CAPSULE ORAL 3 TIMES DAILY
Qty: 21 CAPSULE | Refills: 0 | Status: SHIPPED | OUTPATIENT
Start: 2018-02-09 | End: 2018-06-18

## 2018-02-09 RX ORDER — CEPHALEXIN 500 MG/1
500 CAPSULE ORAL 4 TIMES DAILY
Qty: 28 CAPSULE | Refills: 0 | Status: SHIPPED | OUTPATIENT
Start: 2018-02-09 | End: 2018-06-18

## 2018-02-12 ENCOUNTER — OFFICE VISIT (OUTPATIENT)
Dept: ORTHOPEDICS | Facility: CLINIC | Age: 58
End: 2018-02-12
Payer: MEDICARE

## 2018-02-12 ENCOUNTER — TELEPHONE (OUTPATIENT)
Dept: FAMILY MEDICINE | Facility: CLINIC | Age: 58
End: 2018-02-12

## 2018-02-12 VITALS — RESPIRATION RATE: 16 BRPM | BODY MASS INDEX: 31.22 KG/M2 | HEIGHT: 71 IN | WEIGHT: 223 LBS

## 2018-02-12 DIAGNOSIS — L60.0 ONYCHOCRYPTOSIS: Primary | ICD-10-CM

## 2018-02-12 DIAGNOSIS — E11.42 TYPE 2 DIABETES MELLITUS WITH DIABETIC POLYNEUROPATHY, WITH LONG-TERM CURRENT USE OF INSULIN (H): Primary | ICD-10-CM

## 2018-02-12 DIAGNOSIS — Z79.4 TYPE 2 DIABETES MELLITUS WITH DIABETIC POLYNEUROPATHY, WITH LONG-TERM CURRENT USE OF INSULIN (H): Primary | ICD-10-CM

## 2018-02-12 RX ORDER — LANCETS
EACH MISCELLANEOUS
Qty: 102 EACH | Refills: 3 | Status: SHIPPED | OUTPATIENT
Start: 2018-02-12 | End: 2018-02-22

## 2018-02-12 NOTE — PROGRESS NOTES
Subjective:   Erwin Aguilar is a 57 year old male who is f/u for left toenail that is painful and avulsed right toenail. The right side was avulsed 2 weeks ago by Cristy Robledo PA-C. He relates that he does have some pain in the right, however this has significantly improved since the avulsion.   He would like the left nail avulsed and a matrixectomy performed on the left. He is having back surgery in a week to replace his stimulator.     Date of injury: N/A  Date last seen: 11/1/18  Following Therapeutic Plan: Yes   Pain: Unchanged  Function: Unchanged  Interval History: Urea cream, epsom salts    PAST MEDICAL, SOCIAL, SURGICAL AND FAMILY HISTORY: He  has a past medical history of Actinic keratosis; Anxiety (); Arrhythmia; Asthma; Bleeding disorder (H) (3-27-16); Cancer (H); Cauda equina spinal cord injury (H); Chronic pain; Chronic pain syndrome; Chronic sinusitis (5-1-16); Diastasis recti; Esophageal reflux; Esophageal varices in cirrhosis (H) (4/1/2014); Essential hypertension, benign; Generalized anxiety disorder; H/O dental abscess; Hernia; Liver disease (3-); MEDICAL HISTORY OF -; MEDICAL HISTORY OF -; MEDICAL HISTORY OF -; Mild depression (H); Mixed hyperlipidemia; Nasal polyps (5-1-16); Other chronic pain; Seasonal allergic conjunctivitis; Type II or unspecified type diabetes mellitus without mention of complication, not stated as uncontrolled; Ulcer (H); Umbilical hernia without mention of obstruction or gangrene (10/2012); and Unspecified site of spinal cord injury without evidence of spinal bone injury. He also has no past medical history of Chronic infection or PONV (postoperative nausea and vomiting).  He  has a past surgical history that includes surgical history of -  (1/02); surgical history of -  (1999); APPENDECTOMY (1974); SQUAMOUS CELL CARCINOMA AG (10/07); surgical history of -  (+); neuro stimulator (2010); Herniorrhaphy umbilical (11/8/2012); Endoscopy upper, colonoscopy,  "combined (10/19/2011); Transposition ulnar nerve (elbow); Esophagoscopy, gastroscopy, duodenoscopy (EGD), combined (3/28/2014); Esophagoscopy, gastroscopy, duodenoscopy (EGD), combined (6/9/2014); Esophagoscopy, gastroscopy, duodenoscopy (EGD), combined (7/24/2014); Esophagoscopy, gastroscopy, duodenoscopy (EGD), combined (N/A, 10/31/2014); Colonoscopy (N/A, 5/12/2016); Esophagoscopy, gastroscopy, duodenoscopy (EGD), combined (N/A, 5/12/2016); Tonsillectomy (10 1964); and ENT surgery (1-2016).  His family history includes CANCER in his father and mother; DIABETES in his brother and brother. There is no history of Cancer - colorectal.  He reports that he quit smoking about 33 years ago. His smoking use included Cigarettes. He started smoking about 34 years ago. He has a 0.50 pack-year smoking history. He has never used smokeless tobacco. He reports that he uses illicit drugs, including Marijuana, about twice per week. He reports that he does not drink alcohol.    ALLERGIES: He is allergic to lisinopril; acetaminophen; amlodipine; keflex [cephalexin]; morphine; bactrim [sulfamethoxazole w/trimethoprim]; and levaquin.    CURRENT MEDICATIONS: He has a current medication list which includes the following prescription(s): cephalexin, clindamycin, blood glucose monitoring, blood glucose monitoring, promethazine, hydrochlorothiazide, phenol, penicillin v potassium, triamcinolone, order for dme, bd ubaldo u/f, cephalexin, basaglar, urea, urea, ammonium lactate, baclofen, clindamycin, order for dme, propranolol, sertraline, mupirocin, order for dme, sodium chloride, blood glucose monitoring, albuterol, syringe (disposable), potassium chloride sa, omeprazole, alcohol pads, COMPRESSION STOCKINGS, acura blood glucose meter, order for dme, and ACE/ARB NOT PRESCRIBED, INTENTIONAL,.     REVIEW OF SYSTEMS: 9 point review of systems is negative except as noted above.     Exam:   Resp 16  Ht 5' 11\" (1.803 m)  Wt 223 lb (101.2 kg)  " BMI 31.1 kg/m2           CONSTITUTIONAL: healthy, alert and no distress  HEAD: Normocephalic. No masses, lesions, tenderness or abnormalities  SKIN: no suspicious lesions or rashes and incurvated medial and lateral edges of the left hallux. Right hallux is healing well with no s/s of infection and no crusting noted tot the toe.   GAIT: normal  NEUROLOGIC: Non-focal  PSYCHIATRIC: affect normal/bright and mentation appears normal.    MUSCULOSKELETAL: pes planus with hammertoes BL.        Assessment/Plan:   Healing right hallux nail bed s/p avulsion.   Onychocryptosis of the eft hallux without s/s of infection.     - Pt seen and evaluated.   - No further tx necessary to the right hallux.   - He is having back surgery in a week's time. I will defer avulsing the nail until 2 weeks after that. I trimmed the nail back today with slantback's on both sides.   - See again in 3 weeks in ortho clinic.

## 2018-02-12 NOTE — TELEPHONE ENCOUNTER
Pended this order.  Routing to pcp.  This is a really common instruction after the standard order of how often to test.    Routing to pcp as fyi.  Please sign. Ok to close.  RODERICK Gu

## 2018-02-12 NOTE — LETTER
2/12/2018      RE: Erwin Aguilar  [address]  [place] [address]        Subjective:   Erwin Aguilar is a 57 year old male who is f/u for left toenail that is painful and avulsed right toenail. The right side was avulsed 2 weeks ago by Crisyt Robledo PA-C. He relates that he does have some pain in the right, however this has significantly improved since the avulsion.   He would like the left nail avulsed and a matrixectomy performed on the left. He is having back surgery in a week to replace his stimulator.     Date of injury: N/A  Date last seen: 11/1/18  Following Therapeutic Plan: Yes   Pain: Unchanged  Function: Unchanged  Interval History: Urea cream, epsom salts    PAST MEDICAL, SOCIAL, SURGICAL AND FAMILY HISTORY: He  has a past medical history of Actinic keratosis; Anxiety (); Arrhythmia; Asthma; Bleeding disorder (H) (3-27-16); Cancer (H); Cauda equina spinal cord injury (H); Chronic pain; Chronic pain syndrome; Chronic sinusitis (5-1-16); Diastasis recti; Esophageal reflux; Esophageal varices in cirrhosis (H) (4/1/2014); Essential hypertension, benign; Generalized anxiety disorder; H/O dental abscess; Hernia; Liver disease (3-); MEDICAL HISTORY OF -; MEDICAL HISTORY OF -; MEDICAL HISTORY OF -; Mild depression (H); Mixed hyperlipidemia; Nasal polyps (5-1-16); Other chronic pain; Seasonal allergic conjunctivitis; Type II or unspecified type diabetes mellitus without mention of complication, not stated as uncontrolled; Ulcer (H); Umbilical hernia without mention of obstruction or gangrene (10/2012); and Unspecified site of spinal cord injury without evidence of spinal bone injury. He also has no past medical history of Chronic infection or PONV (postoperative nausea and vomiting).  He  has a past surgical history that includes surgical history of -  (1/02); surgical history of -  (1999); APPENDECTOMY (1974); SQUAMOUS CELL CARCINOMA AG (10/07); surgical history of -  (+); neuro  stimulator (2010); Herniorrhaphy umbilical (11/8/2012); Endoscopy upper, colonoscopy, combined (10/19/2011); Transposition ulnar nerve (elbow); Esophagoscopy, gastroscopy, duodenoscopy (EGD), combined (3/28/2014); Esophagoscopy, gastroscopy, duodenoscopy (EGD), combined (6/9/2014); Esophagoscopy, gastroscopy, duodenoscopy (EGD), combined (7/24/2014); Esophagoscopy, gastroscopy, duodenoscopy (EGD), combined (N/A, 10/31/2014); Colonoscopy (N/A, 5/12/2016); Esophagoscopy, gastroscopy, duodenoscopy (EGD), combined (N/A, 5/12/2016); Tonsillectomy (10 1964); and ENT surgery (1-2016).  His family history includes CANCER in his father and mother; DIABETES in his brother and brother. There is no history of Cancer - colorectal.  He reports that he quit smoking about 33 years ago. His smoking use included Cigarettes. He started smoking about 34 years ago. He has a 0.50 pack-year smoking history. He has never used smokeless tobacco. He reports that he uses illicit drugs, including Marijuana, about twice per week. He reports that he does not drink alcohol.    ALLERGIES: He is allergic to lisinopril; acetaminophen; amlodipine; keflex [cephalexin]; morphine; bactrim [sulfamethoxazole w/trimethoprim]; and levaquin.    CURRENT MEDICATIONS: He has a current medication list which includes the following prescription(s): cephalexin, clindamycin, blood glucose monitoring, blood glucose monitoring, promethazine, hydrochlorothiazide, phenol, penicillin v potassium, triamcinolone, order for dme, bd ubaldo u/f, cephalexin, basaglar, urea, urea, ammonium lactate, baclofen, clindamycin, order for dme, propranolol, sertraline, mupirocin, order for dme, sodium chloride, blood glucose monitoring, albuterol, syringe (disposable), potassium chloride sa, omeprazole, alcohol pads, COMPRESSION STOCKINGS, acura blood glucose meter, order for dme, and ACE/ARB NOT PRESCRIBED, INTENTIONAL,.     REVIEW OF SYSTEMS: 9 point review of systems is negative except  "as noted above.     Exam:   Resp 16  Ht 5' 11\" (1.803 m)  Wt 223 lb (101.2 kg)  BMI 31.1 kg/m2           CONSTITUTIONAL: healthy, alert and no distress  HEAD: Normocephalic. No masses, lesions, tenderness or abnormalities  SKIN: no suspicious lesions or rashes and incurvated medial and lateral edges of the left hallux. Right hallux is healing well with no s/s of infection and no crusting noted tot the toe.   GAIT: normal  NEUROLOGIC: Non-focal  PSYCHIATRIC: affect normal/bright and mentation appears normal.    MUSCULOSKELETAL: pes planus with hammertoes BL.        Assessment/Plan:   Healing right hallux nail bed s/p avulsion.   Onychocryptosis of the eft hallux without s/s of infection.     - Pt seen and evaluated.   - No further tx necessary to the right hallux.   - He is having back surgery in a week's time. I will defer avulsing the nail until 2 weeks after that. I trimmed the nail back today with slantback's on both sides.   - See again in 3 weeks in ortho clinic.         Victor M Anaya, GERONIMO    "

## 2018-02-12 NOTE — MR AVS SNAPSHOT
After Visit Summary   2/12/2018    Erwin Aguilar    MRN: [id number]           Patient Information     Date Of Birth          1960        Visit Information        Provider Department      2/12/2018 11:00 AM Victor M Anaya DPM [facility]        Today's Diagnoses     Onychocryptosis    -  1       Follow-ups after your visit        Your next 10 appointments already scheduled     Feb 13, 2018  8:20 AM CST   Pre-Op physical with Joel Daniel Irwin Wegener, MD   [facility] ([facility])    [address] [place] [id number] [phone]            Feb 16, 2018  8:40 AM CST   (Arrive by 8:25 AM)   Return Visit with VANCE Benz [facility] ([facility])    [address] [place] [address]  4th [facility] [id number] [phone]            Feb 19, 2018   Procedure with Elvis Sauceda MD   [facility] ([facility])    [address] [place] [address]  5th Floor  [facility] [id number] [phone]           Located in the [facility] at [address] [place] [address].   parking is very convenient and highly recommended.  is a $6 flat rate fee.  Both  and self parkers should enter the main arrival plaza from [facility]; parking attendants will direct you based on your parking preference.            Mar 06, 2018  9:00 AM CST   Lab with  LAB   [facility] Lab ([facility])    [address] [place] [address]  1st Floor  [facility] [id number] [phone]            Mar 06, 2018 10:00 AM CST   (Arrive by 9:45 AM)   Return General Liver with Kimberly Ramirez MD   [facility] Hepatology ([facility])    [address] [place] [address]  [address]  [facility] [id number] [phone]            Mar 14, 2018  8:40 AM CDT  "  (Arrive by 8:25 AM)   Return Visit with Elvis Sauceda MD   [facility] ([facility])    [address] [address] [phone]            Mar 19, 2018  7:20 AM CDT   (Arrive by 7:05 AM)   RETURN FOOT/ANKLE with Victor M Anyaa DPM   [facility] ([facility])    [address] [place] [id number] [phone]              Who to contact     Please call your clinic at [phone] to:    Ask questions about your health    Make or cancel appointments    Discuss your medicines    Learn about your test results    Speak to your doctor            Additional Information About Your Visit        [employer] Access Information     [employer] gives you secure access to your electronic health record. If you see a primary care provider, you can also send messages to your care team and make appointments. If you have questions, please call your primary care clinic.  If you do not have a primary care provider, please call [phone] and they will assist you.      [employer] is an electronic gateway that provides easy, online access to your medical records. With [employer], you can request a clinic appointment, read your test results, renew a prescription or communicate with your care team.     To access your existing account, please contact your [facility] or call [phone] for assistance.        Care EveryWhere ID     This is your Care EveryWhere ID. This could be used by other organizations to access your [facility] medical records  [id number]        Your Vitals Were     Respirations Height BMI (Body Mass Index)             16 5' 11\" (1.803 m) 31.1 kg/m2          Blood Pressure from Last 3 Encounters:   02/01/18 162/89   01/25/18 178/80   12/15/17 (P) 173/87    Weight from Last 3 Encounters:   02/12/18 223 lb (101.2 kg) "   02/01/18 223 lb 9.6 oz (101.4 kg)   01/25/18 219 lb 8 oz (99.6 kg)              Today, you had the following     No orders found for display         Today's Medication Changes          These changes are accurate as of 2/12/18 11:19 AM.  If you have any questions, ask your nurse or doctor.               These medicines have changed or have updated prescriptions.        Dose/Directions    * clindamycin 150 MG capsule   Commonly known as:  CLEOCIN   This may have changed:    - when to take this  - reasons to take this  - additional instructions   Used for:  Chronic sinusitis, unspecified location        OPEN 1 CAP AND PLACE IN 1 LITER OF NORMAL SALINE & MIX. IRRIGATE WITH 20 ML EACH NOSTRIL 4 TIMES PER DAY   Quantity:  10 capsule   Refills:  3       * clindamycin 300 MG capsule   Commonly known as:  CLEOCIN   This may have changed:  Another medication with the same name was changed. Make sure you understand how and when to take each.   Used for:  Failure of spinal cord stimulator (H)        Dose:  300 mg   Take 1 capsule (300 mg) by mouth 3 times daily   Quantity:  21 capsule   Refills:  0       omeprazole 40 MG capsule   Commonly known as:  priLOSEC   This may have changed:    - when to take this  - reasons to take this  - additional instructions   Used for:  Gastroesophageal reflux disease without esophagitis        Dose:  40 mg   Take 1 capsule (40 mg) by mouth daily Take 30-60 minutes before a meal.   Quantity:  90 capsule   Refills:  3       * Notice:  This list has 2 medication(s) that are the same as other medications prescribed for you. Read the directions carefully, and ask your doctor or other care provider to review them with you.             Primary Care Provider Office Phone # Fax #    Joel Daniel Irwin Wegener, [phone] [address] [facility] [address]        Equal Access to Services     LI GENAO AH: sonia Snider qaybta kaalmada  donis brocksolis ramirezaan ah. So [facility] [phone].    ATENCIÓN: Si gabriellela magdi, tiene a camp disposición servicios gratuitos de asistencia lingüística. Neftaly al [phone].    We comply with applicable federal civil rights laws and [place] laws. We do not discriminate on the basis of race, color, national origin, age, disability, sex, sexual orientation, or gender identity.            Thank you!     Thank you for choosing [facility]  for your care. Our goal is always to provide you with excellent care. Hearing back from our patients is one way we can continue to improve our services. Please take a few minutes to complete the written survey that you may receive in the mail after your visit with us. Thank you!             Your Updated Medication List - Protect others around you: Learn how to safely use, store and throw away your medicines at www.disposemymeds.org.          This list is accurate as of 2/12/18 11:19 AM.  Always use your most recent med list.                   Brand Name Dispense Instructions for use Diagnosis    * ACE/ARB/ARNI NOT PRESCRIBED (INTENTIONAL)      ACE & ARB not prescribed due to Allergy        ACURA BLOOD GLUCOSE METER W/DEVICE Kit     1 kit    1 Dose 2 times daily    Type 2 diabetes, HbA1c goal < 7% (H)       albuterol 108 (90 BASE) MCG/ACT Inhaler    PROAIR HFA/PROVENTIL HFA/VENTOLIN HFA    3 Inhaler    Inhale 2 puffs into the lungs every 6 hours as needed for shortness of breath / dyspnea or wheezing Ventolin or other covered brand    Moderate persistent asthma without complication       Alcohol Pads 70 % Pads     100 each    1 Box 4 times daily    Type 2 diabetes, HbA1c goal < 7% (H)       ammonium lactate 12 % cream    LAC-HYDRIN    385 g    Apply topically 2 times daily as needed for dry skin    Xerosis cutis       baclofen 10 MG tablet    LIORESAL    180 tablet    TAKE 2 TABLETS BY MOUTH THREE TIMES DAILY    Muscle spasms of both lower  extremities, Back muscle spasm       BASAGLAR 100 UNIT/ML injection     30 mL    Inject 26 Units Subcutaneous daily    Type 2 diabetes mellitus without complication, with long-term current use of insulin (H)       BD ROSE U/F 32G X 4 MM   Generic drug:  insulin pen needle     100 each    USE ONCE DAILY AS DIRECTED    Type 2 diabetes mellitus without complication, with long-term current use of insulin (H)       blood glucose monitoring lancets     102 each    Use to test blood sugar 1 time daily or as directed.    Type 2 diabetes mellitus with diabetic polyneuropathy, with long-term current use of insulin (H)       * blood glucose monitoring test strip    no brand specified    1 Box    Use to test blood sugars one time daily or as directed  Accucheck Richards plus test strips    Type 2 diabetes, HbA1c goal < 7% (H)       * blood glucose monitoring test strip    no brand specified    100 strip    Use to test blood sugars 1 time daily or as directed. ACCU-CHEK KARISHMA BRAND PLEASE.    Type 2 diabetes mellitus with diabetic polyneuropathy, with long-term current use of insulin (H)       * cephALEXin 500 MG capsule    KEFLEX    68 capsule    Take 1 capsule (500 mg) by mouth 4 times daily START THREE (3) DAYS BEFORE SURGERY    Failure of spinal cord stimulator, initial encounter (H)       * cephALEXin 500 MG capsule    KEFLEX    28 capsule    Take 1 capsule (500 mg) by mouth 4 times daily    Failure of spinal cord stimulator (H)       * clindamycin 150 MG capsule    CLEOCIN    10 capsule    OPEN 1 CAP AND PLACE IN 1 LITER OF NORMAL SALINE & MIX. IRRIGATE WITH 20 ML EACH NOSTRIL 4 TIMES PER DAY    Chronic sinusitis, unspecified location       * clindamycin 300 MG capsule    CLEOCIN    21 capsule    Take 1 capsule (300 mg) by mouth 3 times daily    Failure of spinal cord stimulator (H)       COMPRESSION STOCKINGS     2 each    Knee high compression socks 30-40-mm    Lymphedema of both lower extremities       hydrochlorothiazide  "25 MG tablet    HYDRODIURIL     TAKE 1 TABLET (25 MG) BY MOUTH DAILY        mupirocin 2 % ointment    BACTROBAN    2 g    Apply to anterior nares BID X 2 month supply    Nasal lesion, Nasal vestibulitis       omeprazole 40 MG capsule    priLOSEC    90 capsule    Take 1 capsule (40 mg) by mouth daily Take 30-60 minutes before a meal.    Gastroesophageal reflux disease without esophagitis       * order for DME     1 Device    Equipment being ordered: BP cuff    Hypertension goal BP (blood pressure) < 140/90       * order for DME     1 Device    Equipment being ordered: one donut for sitting    Pressure ulcer, stage II       * order for DME     5 each    ?Location:sacral ulcer unknown etiology possible sheer ?measurement: 2.5 cm x 2 cm x 0.1 cm Unlikely to be a  pressure ulcer but if it is it's a stg 2,  partial thickness, low eexudate  Wound supplies : 5 each Comfeel? Plus [id number] x 2 3/4''  5x7 cm  [facility]   wound care orders: cleans wound, dry thoroughly  and replace Comfeel. Leave in place for 7 days for 5 weeks    Wound, open, buttock, right, initial encounter       * order for DME     1 Box    Equipment being ordered: wound care supplies CONVATEC DUODERM  Extra Thin Dressing - 1 1/4\" x 1 1/2\", Spots, oval [id number] Wound measurements 3 cm x 3 cm x 0.1 cm lesions with wound area of 1.5 x 1.5 that has slight drainage, full thickness located on the right sacral region    Benign neoplasm of skin of trunk, except scrotum       penicillin V potassium 250 MG tablet    VEETID    60 tablet    Take 1 tablet (250 mg) by mouth 2 times daily    Recurrent cellulitis       phenol 89 % Swab swab    PHENOL EZ SWABS    3 each    Externally apply 1 each topically DURING SURGERY for 1 dose    Ingrown toenail       potassium chloride SA 20 MEQ CR tablet    KLOR-CON    180 tablet    Take 3 tablets (60 mEq) by mouth daily    Hypokalemia       promethazine 25 MG tablet    PHENERGAN    40 tablet    TAKE 1 TABLET (25 MG) BY " MOUTH EVERY 6 HOURS AS NEEDED FOR NAUSEA    Nausea without vomiting       propranolol 20 MG tablet    INDERAL    270 tablet    Take 1 tablet (20 mg) by mouth 3 times daily    Esophageal varices in cirrhosis (H)       sertraline 100 MG tablet    ZOLOFT    180 tablet    Take 2 tablets (200 mg) by mouth daily    Generalized anxiety disorder       sodium chloride 0.9 % Soln     09229 mL    Irrigate 20 cc each nostril QID X 2 month supply    Chronic maxillary sinusitis       Syringe (Disposable) 25 ML Misc     1 each    Syringe to be used for nasal irrigation    Chronic maxillary sinusitis       triamcinolone 0.1 % ointment    KENALOG    30 g    Apply sparingly to affected area three times daily for 14 days.    Eczema, unspecified type       * Urea 20 % Crea cream     85 g    Apply topically as needed    Xerosis of skin       * Urea 20 % Crea cream     85 g    Apply topically daily To affected toes    Xerosis of skin, Tyloma, Nail avulsion of toe, subsequent encounter       * Notice:  This list has 13 medication(s) that are the same as other medications prescribed for you. Read the directions carefully, and ask your doctor or other care provider to review them with you.

## 2018-02-12 NOTE — TELEPHONE ENCOUNTER
"Reason for Call:  Medication or medication refill:    Do you use a [facility]?  Name of the pharmacy and phone number for the current request:  [facility] ([phone])    Name of the medication requested: Accuchek Lynette test strips and lancets    Other request: Pharmacy states they need a new hard copy script for these with directions.  The directions can NOT say \"or as directed\".  Medicare Part B will not cover it with those directions.    Can we leave a detailed message on this number? YES    Phone number patient can be reached at: Other phone number:  [phone]    Call taken on 2/12/2018 at 9:03 AM by Venice Jackson      "

## 2018-02-22 ENCOUNTER — TELEPHONE (OUTPATIENT)
Dept: FAMILY MEDICINE | Facility: CLINIC | Age: 58
End: 2018-02-22

## 2018-02-22 DIAGNOSIS — E11.9 TYPE 2 DIABETES, HBA1C GOAL < 7% (H): Primary | ICD-10-CM

## 2018-02-22 RX ORDER — LANCETS
EACH MISCELLANEOUS
Qty: 100 EACH | Refills: 3 | Status: SHIPPED | OUTPATIENT
Start: 2018-02-22 | End: 2020-10-09

## 2018-02-22 NOTE — TELEPHONE ENCOUNTER
Reason for Call:  Other prescription- blood glucose monitoring (ACCU-CHEK FASTCLIX) lancets    Detailed comments: Patient let pharmacy know that this was the incorrect lancets. What he uses is  blood glucose monitoring (ACCU-CHEK Soft click) lancets. Please advise, thank you!    Please send new script to : [facility]/pharmacy #[id number] - [place], [place] - [address]    Phone Number Patient can be reached at: Home number on file [phone] (home)    Best Time: anytime    Can we leave a detailed message on this number? Not Applicable    Call taken on 2/22/2018 at 1:56 PM by Autumn Borja

## 2018-03-02 ENCOUNTER — TELEPHONE (OUTPATIENT)
Dept: ANESTHESIOLOGY | Facility: CLINIC | Age: 58
End: 2018-03-02

## 2018-03-07 ENCOUNTER — TELEPHONE (OUTPATIENT)
Dept: ANESTHESIOLOGY | Facility: CLINIC | Age: 58
End: 2018-03-07

## 2018-03-07 ENCOUNTER — TELEPHONE (OUTPATIENT)
Dept: GASTROENTEROLOGY | Facility: CLINIC | Age: 58
End: 2018-03-07

## 2018-03-07 NOTE — TELEPHONE ENCOUNTER
----- Message from Vira Mayer sent at 3/7/2018  8:57 AM CST -----  Regarding: Patient call  Contact: [phone]  Patient called to schedule an appointment with the pain clinic. He can be reached at the number above. Thanks!

## 2018-03-07 NOTE — TELEPHONE ENCOUNTER
I called and scheduled Erwin for his surgery with Dr. Sauceda.     These procedures are not done at the [facility], so this is scheduled on 4/13/18 at the [facility].     He is aware that he will need a  and someone to be with him after the procedure. Dr. Sauceda will do his pre-op the morning of the procedure.     His post-op is scheduled for 4/23/2018.

## 2018-03-07 NOTE — TELEPHONE ENCOUNTER
Patient had follow up appt with Dr. Ramirez 3/6 but missed because wasn't feeling well as he had just had surgery 3/2/18. Patient rescheduled appointment for 7/11/16 but wanted to know will he need blood work beforehand.  Let patient know that he should get blood work this March or April as it will be the six month ramiro.  Lab orders are in and he can come in at any time.  Patient had no further questions.

## 2018-03-13 DIAGNOSIS — K70.30 ALCOHOLIC CIRRHOSIS OF LIVER WITHOUT ASCITES (H): Primary | ICD-10-CM

## 2018-03-14 ENCOUNTER — RADIANT APPOINTMENT (OUTPATIENT)
Dept: ULTRASOUND IMAGING | Facility: CLINIC | Age: 58
End: 2018-03-14
Attending: INTERNAL MEDICINE
Payer: MEDICARE

## 2018-03-14 DIAGNOSIS — G89.4 CHRONIC PAIN SYNDROME: ICD-10-CM

## 2018-03-14 DIAGNOSIS — K70.30 ALCOHOLIC CIRRHOSIS OF LIVER WITHOUT ASCITES (H): ICD-10-CM

## 2018-03-14 DIAGNOSIS — Z79.4 TYPE 2 DIABETES MELLITUS WITH DIABETIC POLYNEUROPATHY, WITH LONG-TERM CURRENT USE OF INSULIN (H): ICD-10-CM

## 2018-03-14 DIAGNOSIS — T85.192A FAILURE OF SPINAL CORD STIMULATOR, INITIAL ENCOUNTER (H): ICD-10-CM

## 2018-03-14 DIAGNOSIS — E11.42 TYPE 2 DIABETES MELLITUS WITH DIABETIC POLYNEUROPATHY, WITH LONG-TERM CURRENT USE OF INSULIN (H): ICD-10-CM

## 2018-03-14 LAB
ALBUMIN SERPL-MCNC: 3.6 G/DL (ref 3.4–5)
ALP SERPL-CCNC: 127 U/L (ref 40–150)
ALT SERPL W P-5'-P-CCNC: 19 U/L (ref 0–70)
ANION GAP SERPL CALCULATED.3IONS-SCNC: 4 MMOL/L (ref 3–14)
AST SERPL W P-5'-P-CCNC: 16 U/L (ref 0–45)
BILIRUB DIRECT SERPL-MCNC: <0.1 MG/DL (ref 0–0.2)
BILIRUB SERPL-MCNC: 0.3 MG/DL (ref 0.2–1.3)
BUN SERPL-MCNC: 6 MG/DL (ref 7–30)
CALCIUM SERPL-MCNC: 8.8 MG/DL (ref 8.5–10.1)
CHLORIDE SERPL-SCNC: 105 MMOL/L (ref 94–109)
CO2 SERPL-SCNC: 32 MMOL/L (ref 20–32)
CREAT SERPL-MCNC: 0.73 MG/DL (ref 0.66–1.25)
ERYTHROCYTE [DISTWIDTH] IN BLOOD BY AUTOMATED COUNT: 15.1 % (ref 10–15)
GFR SERPL CREATININE-BSD FRML MDRD: >90 ML/MIN/1.7M2
GLUCOSE SERPL-MCNC: 115 MG/DL (ref 70–99)
HCT VFR BLD AUTO: 38.3 % (ref 40–53)
HGB BLD-MCNC: 12.3 G/DL (ref 13.3–17.7)
INR PPP: 1.08 (ref 0.86–1.14)
MCH RBC QN AUTO: 26.5 PG (ref 26.5–33)
MCHC RBC AUTO-ENTMCNC: 32.1 G/DL (ref 31.5–36.5)
MCV RBC AUTO: 83 FL (ref 78–100)
PLATELET # BLD AUTO: 196 10E9/L (ref 150–450)
POTASSIUM SERPL-SCNC: 3.5 MMOL/L (ref 3.4–5.3)
PROT SERPL-MCNC: 7.5 G/DL (ref 6.8–8.8)
RBC # BLD AUTO: 4.64 10E12/L (ref 4.4–5.9)
SODIUM SERPL-SCNC: 140 MMOL/L (ref 133–144)
WBC # BLD AUTO: 6 10E9/L (ref 4–11)

## 2018-03-19 ENCOUNTER — OFFICE VISIT (OUTPATIENT)
Dept: ORTHOPEDICS | Facility: CLINIC | Age: 58
End: 2018-03-19
Payer: MEDICARE

## 2018-03-19 DIAGNOSIS — L60.0 ONYCHOCRYPTOSIS: Primary | ICD-10-CM

## 2018-03-19 NOTE — NURSING NOTE
"Reason For Visit:   Chief Complaint   Patient presents with     RECHECK     Follow up. Left great toe nail. Pt stated that he is here today to have his toenail removed. Pt stated that he made his own attempt to remove it. Pt stated that his spinal stimulator surgery was post poned. Pt stated that this is the 3rd timed the surgery has been post poned.         Pain Assessment  Patient Currently in Pain: Yes  0-10 Pain Scale: 8  Primary Pain Location:  (Great toe)  Pain Orientation: Left            Current Outpatient Prescriptions   Medication Sig Dispense Refill     blood glucose monitoring (SOFTCLIX) lancets Use to test blood sugar 1 time daily or as directed. 100 each 3     blood glucose monitoring (NO BRAND SPECIFIED) test strip Use to test blood sugars 1 time daily. ACCU-CHEK KARISHMA BRAND PLEASE. 100 strip 3     cephALEXin (KEFLEX) 500 MG capsule Take 1 capsule (500 mg) by mouth 4 times daily 28 capsule 0     clindamycin (CLEOCIN) 300 MG capsule Take 1 capsule (300 mg) by mouth 3 times daily 21 capsule 0     promethazine (PHENERGAN) 25 MG tablet TAKE 1 TABLET (25 MG) BY MOUTH EVERY 6 HOURS AS NEEDED FOR NAUSEA 40 tablet 11     hydrochlorothiazide (HYDRODIURIL) 25 MG tablet TAKE 1 TABLET (25 MG) BY MOUTH DAILY  3     phenol (PHENOL EZ SWABS) 89 % SWAB swab Externally apply 1 each topically DURING SURGERY for 1 dose 3 each 0     penicillin V potassium (VEETID) 250 MG tablet Take 1 tablet (250 mg) by mouth 2 times daily 60 tablet 11     triamcinolone (KENALOG) 0.1 % ointment Apply sparingly to affected area three times daily for 14 days. 30 g 0     order for DME Equipment being ordered: wound care supplies  CONVATEC DUODERM  Extra Thin Dressing - 1 1/4\" x 1 1/2\", Spots, oval  [id number]  Wound measurements  3 cm x 3 cm x 0.1 cm lesions with wound area of 1.5 x 1.5 that has slight drainage, full thickness located on the right sacral region 1 Box 3     BD ROSE U/F 32G X 4 MM insulin pen needle USE ONCE DAILY AS DIRECTED " 100 each 11     cephALEXin (KEFLEX) 500 MG capsule Take 1 capsule (500 mg) by mouth 4 times daily START THREE (3) DAYS BEFORE SURGERY 68 capsule 0     BASAGLAR 100 UNIT/ML injection Inject 26 Units Subcutaneous daily 30 mL 11     Urea 20 % CREA cream Apply topically as needed 85 g 6     Urea 20 % CREA cream Apply topically daily To affected toes 85 g 11     ammonium lactate (LAC-HYDRIN) 12 % cream Apply topically 2 times daily as needed for dry skin 385 g 3     baclofen (LIORESAL) 10 MG tablet TAKE 2 TABLETS BY MOUTH THREE TIMES DAILY 180 tablet 8     clindamycin (CLEOCIN) 150 MG capsule OPEN 1 CAP AND PLACE IN 1 LITER OF NORMAL SALINE & MIX. IRRIGATE WITH 20 ML EACH NOSTRIL 4 TIMES PER DAY (Patient taking differently: 4 times daily as needed OPEN 1 CAP AND PLACE IN 1 LITER OF NORMAL SALINE & MIX. IRRIGATE WITH 20 ML EACH NOSTRIL 4 TIMES PER DAY) 10 capsule 3     order for DME   Location:sacral ulcer unknown etiology possible sheer    measurement: 2.5 cm x 2 cm x 0.1 cm Unlikely to be a  pressure ulcer but if it is it's a stg 2,  partial thickness, low eexudate    Wound supplies :  5 each Comfeel  Plus Transparent 3530 2 x 2 3/4''  5x7 cm  [facility]     wound care orders: cleans wound, dry thoroughly  and replace Comfeel. Leave in place for 7 days for 5 weeks 5 each 3     propranolol (INDERAL) 20 MG tablet Take 1 tablet (20 mg) by mouth 3 times daily 270 tablet 3     sertraline (ZOLOFT) 100 MG tablet Take 2 tablets (200 mg) by mouth daily 180 tablet 1     mupirocin (BACTROBAN) 2 % ointment Apply to anterior nares BID X 2 month supply 2 g 3     order for DME Equipment being ordered: one donut for sitting 1 Device 0     sodium chloride 0.9 % SOLN Irrigate 20 cc each nostril QID X 2 month supply 45086 mL 3     blood glucose monitoring (NO BRAND SPECIFIED) test strip Use to test blood sugars one time daily or as directed    Accucheck Richards plus test strips 1 Box 5     albuterol (PROAIR HFA, PROVENTIL HFA, VENTOLIN HFA)  108 (90 BASE) MCG/ACT inhaler Inhale 2 puffs into the lungs every 6 hours as needed for shortness of breath / dyspnea or wheezing Ventolin or other covered brand 3 Inhaler 3     Syringe, Disposable, 25 ML MISC Syringe to be used for nasal irrigation 1 each 3     potassium chloride SA (POTASSIUM CHLORIDE) 20 MEQ tablet Take 3 tablets (60 mEq) by mouth daily 180 tablet 3     omeprazole (PRILOSEC) 40 MG capsule Take 1 capsule (40 mg) by mouth daily Take 30-60 minutes before a meal. (Patient taking differently: Take 40 mg by mouth daily as needed Take 30-60 minutes before a meal.) 90 capsule 3     Alcohol Swabs (ALCOHOL PADS) 70 % PADS 1 Box 4 times daily 100 each 3     COMPRESSION STOCKINGS Knee high compression socks 30-40-mm 2 each 1     Blood Glucose Monitoring Suppl (ACURA BLOOD GLUCOSE METER) W/DEVICE KIT 1 Dose 2 times daily 1 kit 1     ORDER FOR DME Equipment being ordered: BP cuff 1 Device 0     ACE/ARB NOT PRESCRIBED, INTENTIONAL, ACE & ARB not prescribed due to Allergy            Allergies   Allergen Reactions     Lisinopril Anaphylaxis     Swollen tongue; inability to swallow; drooling; hives; swollen face, neck, angioedema     Acetaminophen      Hx of cirrhosis      Amlodipine      Swelling, hives, possible angioedema       Keflex [Cephalexin]      Patient reports that he has taken augmentin and tolerated in in 2/2017     Morphine      b/p dropped and arms went numb  Hypotension     Bactrim [Sulfamethoxazole W/Trimethoprim] Rash     Levaquin Swelling and Rash     Swelling in lip and tongue felt thick

## 2018-03-19 NOTE — MR AVS SNAPSHOT
After Visit Summary   3/19/2018    Erwin Aguilar    MRN: [id number]           Patient Information     Date Of Birth          1960        Visit Information        Provider Department      3/19/2018 7:20 AM Victor M Anaya DPM [facility]        Today's Diagnoses     Onychocryptosis    -  1       Follow-ups after your visit        Your next 10 appointments already scheduled     Apr 13, 2018   Procedure with Elvis Sauceda MD   [facility], [place], Same Day Surgery (--)    [address]  [facility] [address] [phone]            Apr 23, 2018 10:40 AM CDT   (Arrive by 10:25 AM)   Return Visit with Elvis Sauceda MD   [facility] ([facility])    [address]  4th Floor  [facility] [address] [phone]            Jul 11, 2018  7:00 AM CDT   Lab with  LAB   [facility] ([facility])    [address]  1st Floor  [facility] [id number] [phone]            Jul 11, 2018  8:00 AM CDT   (Arrive by 7:45 AM)   Return General Liver with Kimberly Ramirez MD   [facility] ([facility])    [address]  [address]  [facility] [id number] [phone]              Who to contact     Please call your clinic at [phone] to:    Ask questions about your health    Make or cancel appointments    Discuss your medicines    Learn about your test results    Speak to your doctor            Additional Information About Your Visit        MyChart Information     [employer] gives you secure access to your electronic health record. If you see a primary care provider, you can also send messages to your care team and make appointments. If you have questions, please call your primary care clinic.  If you do not have a primary care provider, please call [phone] and they will assist you.       [employer] is an electronic gateway that provides easy, online access to your medical records. With Get Together, you can request a clinic appointment, read your test results, renew a prescription or communicate with your care team.     To access your existing account, please contact your [facility] or call [phone] for assistance.        Care EveryWhere ID     This is your Care EveryWhere ID. This could be used by other organizations to access your [facility] medical records  [id number]         Blood Pressure from Last 3 Encounters:   02/01/18 162/89   01/25/18 178/80   12/15/17 (P) 173/87    Weight from Last 3 Encounters:   02/12/18 101.2 kg (223 lb)   02/01/18 101.4 kg (223 lb 9.6 oz)   01/25/18 99.6 kg (219 lb 8 oz)              Today, you had the following     No orders found for display         Today's Medication Changes          These changes are accurate as of 3/19/18  7:37 AM.  If you have any questions, ask your nurse or doctor.               These medicines have changed or have updated prescriptions.        Dose/Directions    * clindamycin 150 MG capsule   Commonly known as:  CLEOCIN   This may have changed:    - when to take this  - reasons to take this  - additional instructions   Used for:  Chronic sinusitis, unspecified location        OPEN 1 CAP AND PLACE IN 1 LITER OF NORMAL SALINE & MIX. IRRIGATE WITH 20 ML EACH NOSTRIL 4 TIMES PER DAY   Quantity:  10 capsule   Refills:  3       * clindamycin 300 MG capsule   Commonly known as:  CLEOCIN   This may have changed:  Another medication with the same name was changed. Make sure you understand how and when to take each.   Used for:  Failure of spinal cord stimulator (H)        Dose:  300 mg   Take 1 capsule (300 mg) by mouth 3 times daily   Quantity:  21 capsule   Refills:  0       omeprazole 40 MG capsule   Commonly known as:  priLOSEC   This may have changed:    - when to take this  - reasons to take this  - additional  instructions   Used for:  Gastroesophageal reflux disease without esophagitis        Dose:  40 mg   Take 1 capsule (40 mg) by mouth daily Take 30-60 minutes before a meal.   Quantity:  90 capsule   Refills:  3       * Notice:  This list has 2 medication(s) that are the same as other medications prescribed for you. Read the directions carefully, and ask your doctor or other care provider to review them with you.             Primary Care Provider Office Phone # Fax #    Joel Daniel Irwin Wegener, [phone] [address] [address]        Equal Access to Services     [facility]: Hadii aad ku hadasho Soomaali, waaxda luqadaha, qaybta kaalmada adeegyada, waxstephanie nicholas hayaanay allison . So [facility] [phone].    ATENCIÓN: Si habla español, tiene a camp disposición servicios gratuitos de asistencia lingüística. [facility] [phone].    We comply with applicable federal civil rights laws and [place] laws. We do not discriminate on the basis of race, color, national origin, age, disability, sex, sexual orientation, or gender identity.            Thank you!     Thank you for choosing [facility]  for your care. Our goal is always to provide you with excellent care. Hearing back from our patients is one way we can continue to improve our services. Please take a few minutes to complete the written survey that you may receive in the mail after your visit with us. Thank you!             Your Updated Medication List - Protect others around you: Learn how to safely use, store and throw away your medicines at www.disposemymeds.org.          This list is accurate as of 3/19/18  7:37 AM.  Always use your most recent med list.                   Brand Name Dispense Instructions for use Diagnosis    * ACE/ARB/ARNI NOT PRESCRIBED (INTENTIONAL)      ACE & ARB not prescribed due to Allergy        ACURA BLOOD GLUCOSE METER W/DEVICE Kit     1 kit    1 Dose 2 times daily    Type  2 diabetes, HbA1c goal < 7% (H)       albuterol 108 (90 BASE) MCG/ACT Inhaler    PROAIR HFA/PROVENTIL HFA/VENTOLIN HFA    3 Inhaler    Inhale 2 puffs into the lungs every 6 hours as needed for shortness of breath / dyspnea or wheezing Ventolin or other covered brand    Moderate persistent asthma without complication       Alcohol Pads 70 % Pads     100 each    1 Box 4 times daily    Type 2 diabetes, HbA1c goal < 7% (H)       ammonium lactate 12 % cream    LAC-HYDRIN    385 g    Apply topically 2 times daily as needed for dry skin    Xerosis cutis       baclofen 10 MG tablet    LIORESAL    180 tablet    TAKE 2 TABLETS BY MOUTH THREE TIMES DAILY    Muscle spasms of both lower extremities, Back muscle spasm       BASAGLAR 100 UNIT/ML injection     30 mL    Inject 26 Units Subcutaneous daily    Type 2 diabetes mellitus without complication, with long-term current use of insulin (H)       BD ORSE U/F 32G X 4 MM   Generic drug:  insulin pen needle     100 each    USE ONCE DAILY AS DIRECTED    Type 2 diabetes mellitus without complication, with long-term current use of insulin (H)       blood glucose monitoring lancets     100 each    Use to test blood sugar 1 time daily or as directed.    Type 2 diabetes, HbA1c goal < 7% (H)       * blood glucose monitoring test strip    no brand specified    1 Box    Use to test blood sugars one time daily or as directed  Accucheck Richards plus test strips    Type 2 diabetes, HbA1c goal < 7% (H)       * blood glucose monitoring test strip    no brand specified    100 strip    Use to test blood sugars 1 time daily. ACCU-CHEK KARISHMA BRAND PLEASE.    Type 2 diabetes mellitus with diabetic polyneuropathy, with long-term current use of insulin (H)       * cephALEXin 500 MG capsule    KEFLEX    68 capsule    Take 1 capsule (500 mg) by mouth 4 times daily START THREE (3) DAYS BEFORE SURGERY    Failure of spinal cord stimulator, initial encounter (H)       * cephALEXin 500 MG capsule    KEFLEX     "28 capsule    Take 1 capsule (500 mg) by mouth 4 times daily    Failure of spinal cord stimulator (H)       * clindamycin 150 MG capsule    CLEOCIN    10 capsule    OPEN 1 CAP AND PLACE IN 1 LITER OF NORMAL SALINE & MIX. IRRIGATE WITH 20 ML EACH NOSTRIL 4 TIMES PER DAY    Chronic sinusitis, unspecified location       * clindamycin 300 MG capsule    CLEOCIN    21 capsule    Take 1 capsule (300 mg) by mouth 3 times daily    Failure of spinal cord stimulator (H)       COMPRESSION STOCKINGS     2 each    Knee high compression socks 30-40-mm    Lymphedema of both lower extremities       hydrochlorothiazide 25 MG tablet    HYDRODIURIL     TAKE 1 TABLET (25 MG) BY MOUTH DAILY        mupirocin 2 % ointment    BACTROBAN    2 g    Apply to anterior nares BID X 2 month supply    Nasal lesion, Nasal vestibulitis       omeprazole 40 MG capsule    priLOSEC    90 capsule    Take 1 capsule (40 mg) by mouth daily Take 30-60 minutes before a meal.    Gastroesophageal reflux disease without esophagitis       * order for DME     1 Device    Equipment being ordered: BP cuff    Hypertension goal BP (blood pressure) < 140/90       * order for DME     1 Device    Equipment being ordered: one donut for sitting    Pressure ulcer, stage II       * order for DME     5 each    ?Location:sacral ulcer unknown etiology possible sheer ?measurement: 2.5 cm x 2 cm x 0.1 cm Unlikely to be a  pressure ulcer but if it is it's a stg 2,  partial thickness, low eexudate  Wound supplies : 5 each Comfeel? Plus [id number] x 2 3/4''  5x7 cm  [facility]   wound care orders: cleans wound, dry thoroughly  and replace Comfeel. Leave in place for 7 days for 5 weeks    Wound, open, buttock, right, initial encounter       * order for DME     1 Box    Equipment being ordered: wound care supplies CONVATEC DUODERM  Extra Thin Dressing - 1 1/4\" x 1 1/2\", Spots, oval [id number] Wound measurements 3 cm x 3 cm x 0.1 cm lesions with wound area of 1.5 x 1.5 that has " slight drainage, full thickness located on the right sacral region    Benign neoplasm of skin of trunk, except scrotum       penicillin V potassium 250 MG tablet    VEETID    60 tablet    Take 1 tablet (250 mg) by mouth 2 times daily    Recurrent cellulitis       phenol 89 % Swab swab    PHENOL EZ SWABS    3 each    Externally apply 1 each topically DURING SURGERY for 1 dose    Ingrown toenail       potassium chloride SA 20 MEQ CR tablet    KLOR-CON    180 tablet    Take 3 tablets (60 mEq) by mouth daily    Hypokalemia       promethazine 25 MG tablet    PHENERGAN    40 tablet    TAKE 1 TABLET (25 MG) BY MOUTH EVERY 6 HOURS AS NEEDED FOR NAUSEA    Nausea without vomiting       propranolol 20 MG tablet    INDERAL    270 tablet    Take 1 tablet (20 mg) by mouth 3 times daily    Esophageal varices in cirrhosis (H)       sertraline 100 MG tablet    ZOLOFT    180 tablet    Take 2 tablets (200 mg) by mouth daily    Generalized anxiety disorder       sodium chloride 0.9 % Soln     67139 mL    Irrigate 20 cc each nostril QID X 2 month supply    Chronic maxillary sinusitis       Syringe (Disposable) 25 ML Misc     1 each    Syringe to be used for nasal irrigation    Chronic maxillary sinusitis       triamcinolone 0.1 % ointment    KENALOG    30 g    Apply sparingly to affected area three times daily for 14 days.    Eczema, unspecified type       * Urea 20 % Crea cream     85 g    Apply topically as needed    Xerosis of skin       * Urea 20 % Crea cream     85 g    Apply topically daily To affected toes    Xerosis of skin, Tyloma, Nail avulsion of toe, subsequent encounter       * Notice:  This list has 13 medication(s) that are the same as other medications prescribed for you. Read the directions carefully, and ask your doctor or other care provider to review them with you.

## 2018-03-19 NOTE — PROGRESS NOTES
Chief Complaint:   Chief Complaint   Patient presents with     RECHECK          Allergies   Allergen Reactions     Lisinopril Anaphylaxis     Swollen tongue; inability to swallow; drooling; hives; swollen face, neck, angioedema     Acetaminophen      Hx of cirrhosis      Amlodipine      Swelling, hives, possible angioedema       Keflex [Cephalexin]      Patient reports that he has taken augmentin and tolerated in in 2/2017     Morphine      Hypotension     Bactrim [Sulfamethoxazole W/Trimethoprim] Rash     Levaquin Swelling and Rash     Swelling in lip and tongue felt thick         Subjective: Erwin is a 57 year old male who presents to the clinic today for a follow up of left hallux nail. He returns for supposed left hallux nail matrixectomy, however this was scheduled under the assumption that he would have had the dorsal column stimulator changed by Dr. Sauceda. However, this was rescheduled and he is currently scheduled for April 13th. I spoke to Dr. Sauceda who advises to wait until 2 weeks after the procedure to have the nail avulsed.     Objective    Left hallux is slightly incurvated at the medial and lateral borders with some dried blood from self debridement. No s/s of infection noted to the area. No drainage noted to the area. No erythema.     Assessment: Onychocryptosis to the left hallux w/o infection.     Plan:   - Pt seen and evaluated  - I taught him the Arai taping technique to keep the skin away from the nail for the next few weeks. This should help with the pain.   - Pt to return to clinic 2-3 weeks s/p back sx.

## 2018-03-19 NOTE — LETTER
3/19/2018       RE: Erwin Aguilar  [address]  [place] [address]     Dear Colleague,    Thank you for referring your patient, Erwin Aguilar, to the [facility] at [facility] Please see a copy of my visit note below.    Chief Complaint:   Chief Complaint   Patient presents with     RECHECK          Allergies   Allergen Reactions     Lisinopril Anaphylaxis     Swollen tongue; inability to swallow; drooling; hives; swollen face, neck, angioedema     Acetaminophen      Hx of cirrhosis      Amlodipine      Swelling, hives, possible angioedema       Keflex [Cephalexin]      Patient reports that he has taken augmentin and tolerated in in 2/2017     Morphine      Hypotension     Bactrim [Sulfamethoxazole W/Trimethoprim] Rash     Levaquin Swelling and Rash     Swelling in lip and tongue felt thick         Subjective: Erwin is a 57 year old male who presents to the clinic today for a follow up of left hallux nail. He returns for supposed left hallux nail matrixectomy, however this was scheduled under the assumption that he would have had the dorsal column stimulator changed by Dr. Sauceda. However, this was rescheduled and he is currently scheduled for April 13th. I spoke to Dr. Sauceda who advises to wait until 2 weeks after the procedure to have the nail avulsed.     Objective    Left hallux is slightly incurvated at the medial and lateral borders with some dried blood from self debridement. No s/s of infection noted to the area. No drainage noted to the area. No erythema.     Assessment: Onychocryptosis to the left hallux w/o infection.     Plan:   - Pt seen and evaluated  - I taught him the Arai taping technique to keep the skin away from the nail for the next few weeks. This should help with the pain.   - Pt to return to clinic 2-3 weeks s/p back sx.       Again, thank you for allowing me to participate in the care of your patient.       Sincerely,    Victor M Anaya DPM

## 2018-03-20 DIAGNOSIS — I10 HYPERTENSION GOAL BP (BLOOD PRESSURE) < 140/90: ICD-10-CM

## 2018-03-22 NOTE — TELEPHONE ENCOUNTER
BP Readings from Last 3 Encounters:   02/01/18 162/89   01/25/18 178/80   12/15/17 (P) 173/87     Routing refill request to provider for review/approval because:  Labs out of range:  BP's    LOV: 5/9/2017      Thanks! Miriam Cole RN

## 2018-03-23 DIAGNOSIS — E11.42 TYPE 2 DIABETES MELLITUS WITH POLYNEUROPATHY (H): ICD-10-CM

## 2018-03-23 DIAGNOSIS — M20.41 ACQUIRED BILATERAL HAMMER TOES: ICD-10-CM

## 2018-03-23 DIAGNOSIS — M20.42 ACQUIRED BILATERAL HAMMER TOES: ICD-10-CM

## 2018-03-23 DIAGNOSIS — L84 TYLOMA: ICD-10-CM

## 2018-03-23 DIAGNOSIS — M21.371 RIGHT FOOT DROP: Primary | ICD-10-CM

## 2018-03-23 RX ORDER — HYDROCHLOROTHIAZIDE 25 MG/1
TABLET ORAL
Qty: 90 TABLET | Refills: 3 | Status: SHIPPED | OUTPATIENT
Start: 2018-03-23 | End: 2019-04-04

## 2018-03-23 NOTE — TELEPHONE ENCOUNTER
Telephone note from 3/14/18:  I have multiple upcoming appointments in which my BP will be taken. I also removed all of my teeth in order to get dentures I had an ongoing infection in my mouth. That pain and my tooth abscess  probably caused my blood pressure up tick. I The infection started in May of 2017. I had to come up with $3900 for my teeth removal. My blood pressure was 136/72  when I was at the dentist yesterday the same.   Thanks Erwin       I will renew medications for one year.  ASSESSMENT / PLAN:  (I10) Hypertension goal BP (blood pressure) < 140/90  Comment:   Plan: hydrochlorothiazide (HYDRODIURIL) 25 MG tablet        Sincerely,  Dr. Ksenia Drummond MD  3/23/2018

## 2018-03-28 ENCOUNTER — MYC MEDICAL ADVICE (OUTPATIENT)
Dept: FAMILY MEDICINE | Facility: CLINIC | Age: 58
End: 2018-03-28

## 2018-04-04 DIAGNOSIS — T85.192A: Primary | ICD-10-CM

## 2018-04-06 DIAGNOSIS — F41.1 GENERALIZED ANXIETY DISORDER: ICD-10-CM

## 2018-04-06 NOTE — TELEPHONE ENCOUNTER
"Requested Prescriptions   Pending Prescriptions Disp Refills     sertraline (ZOLOFT) 100 MG tablet [Pharmacy Med Name: SERTRALINE  MG TABLET]  Last Written Prescription Date:  3/28/2017  Last Fill Quantity: 180 tabs,  # refills: 1   Last office visit: 5/9/2017 with prescribing provider:  3/28/2017   Future Office Visit:    PHQ-9 SCORE 1/19/2016 10/10/2016 5/9/2017   Total Score - - -   Total Score 0 1 0     JATIN-7 SCORE 9/21/2011 6/5/2012 1/19/2016   Total Score 1 0 -   Total Score - - 0        180 tablet 3     Sig: TAKE 2 TABLETS (200 MG) BY MOUTH DAILY    SSRIs Protocol Passed    4/6/2018 12:17 PM       Passed - Recent (12 mo) or future (30 days) visit within the authorizing provider's specialty    Patient had office visit in the last 12 months or has a visit in the next 30 days with authorizing provider or within the authorizing provider's specialty.  See \"Patient Info\" tab in inbasket, or \"Choose Columns\" in Meds & Orders section of the refill encounter.           Passed - Patient is age 18 or older          "

## 2018-04-11 ENCOUNTER — TELEPHONE (OUTPATIENT)
Dept: ANESTHESIOLOGY | Facility: CLINIC | Age: 58
End: 2018-04-11

## 2018-04-11 RX ORDER — SERTRALINE HYDROCHLORIDE 100 MG/1
TABLET, FILM COATED ORAL
Qty: 180 TABLET | Refills: 0 | Status: SHIPPED | OUTPATIENT
Start: 2018-04-11 | End: 2018-06-18

## 2018-04-11 NOTE — TELEPHONE ENCOUNTER
Patient calling to get back on the schedule for his cancelled procedure that was suppose to be done this coming Friday 4/13/17 with Dr Sauceda. Can be reached at his home number.

## 2018-04-11 NOTE — TELEPHONE ENCOUNTER
Medication prescribed for anxiety.    One refill only as patient due for annual office visit as of 5/9/18.    Team coordinators-Please contact patient to schedule.    Thank you!  FLORIN EscobarN, RN

## 2018-04-12 NOTE — TELEPHONE ENCOUNTER
Patient calling again wanting to know when he is going to be rescheduled with Dr Sauceda again, patient is very frustrated with him. I asked him if I could put him on hold and I would message the , patient was fine with that. Got back to him and stated that unfortunately right now she didn't know when that would be, she is going to a Pain meeting [facility] and will be bringing it up and hopefully getting an answer. I told patient I would have her call him back tomorrow with any kind of update. Patient stated again he is just so frustrated with as he has been doing everything he has been told and Dr Sauceda has cancelled it twice, and would like to go over Dr Sauceda head and talk to someone about this. He wants this by the end of April as his wife will be going out of the country in May and wouldn't be able to do it again until July probably, said there might be one week in May that she will be back home but unsure of the exact days yet. Also he is worried about the fact that its taken so long to get that he might have to start the process all over again. Put patient on hold again and to see what I could find out about somebody else talking to him. Came back and told him that Matty the supervisor for the Pain [facility] will be calling him about his concerns. Also told patient again that hopefully at the meeting [facility] they can figure something out as well. Patient was good with this plan, he was very civil the whole conversation and was reasonable with me.

## 2018-04-13 ENCOUNTER — TELEPHONE (OUTPATIENT)
Dept: ENDOCRINOLOGY | Facility: CLINIC | Age: 58
End: 2018-04-13

## 2018-04-13 ENCOUNTER — TELEPHONE (OUTPATIENT)
Dept: ANESTHESIOLOGY | Facility: CLINIC | Age: 58
End: 2018-04-13

## 2018-04-13 ENCOUNTER — TELEPHONE (OUTPATIENT)
Dept: FAMILY MEDICINE | Facility: CLINIC | Age: 58
End: 2018-04-13

## 2018-04-13 DIAGNOSIS — M20.42 ACQUIRED HAMMER TOE OF LEFT FOOT: ICD-10-CM

## 2018-04-13 DIAGNOSIS — L84 CORNS AND CALLOSITIES: ICD-10-CM

## 2018-04-13 DIAGNOSIS — M20.41 ACQUIRED HAMMER TOE OF RIGHT FOOT: ICD-10-CM

## 2018-04-13 DIAGNOSIS — E11.42 TYPE 2 DIABETES MELLITUS WITH POLYNEUROPATHY (H): Primary | ICD-10-CM

## 2018-04-13 NOTE — TELEPHONE ENCOUNTER
I called Erwin to update him on the scheduling. I let him know that we are looking at additional dates to open for procedures. I will call him as soon as dates open up.

## 2018-04-13 NOTE — TELEPHONE ENCOUNTER
[facility] called stating they are providing ortho diabetic shoes submit most recent clinic visit notes 02/01/2018

## 2018-04-18 NOTE — TELEPHONE ENCOUNTER
Patient called and left a message this morning asking if there were any updates, patient stated that he would only be available for the next couple of weeks, otherwise he won't have his significant other around for awhile and than will have to be out probably into June. Patient doesn't want you to waste your time looking for dates if he isn't going to be available. He can be reached at the listed number.

## 2018-04-19 NOTE — TELEPHONE ENCOUNTER
I spoke with Erwin this morning to give him dates that I have available for his implant. Dr. Sauceda is available on 5/23/18 and 6/28/2018. He is unable to come on 5/23 due to not having any help. I offered to see if I could get him in on 6/4 and that does not work for him either. He is upset that he would have to wait until 6/28. I let him know that I would express his frustration to Dr. Sauceda.     In the meantime, I will schedule him for 6/28/18.

## 2018-04-23 DIAGNOSIS — M62.830 BACK MUSCLE SPASM: ICD-10-CM

## 2018-04-23 DIAGNOSIS — M62.838 MUSCLE SPASMS OF BOTH LOWER EXTREMITIES: ICD-10-CM

## 2018-04-23 RX ORDER — BACLOFEN 10 MG/1
TABLET ORAL
Qty: 180 TABLET | Refills: 8 | OUTPATIENT
Start: 2018-04-23

## 2018-04-23 NOTE — TELEPHONE ENCOUNTER
baclofen (LIORESAL) 10 MG tablet      9 refills written 9/19/17 - patient shouldn't be out of refills     Refused Prescriptions:                       Disp   Refills    baclofen (LIORESAL) 10 MG tablet [Pharmacy*180 ta*8        Sig: TAKE 2 TABLETS BY MOUTH THREE TIMES DAILY  Refused By: BRENDAN ARRIAZA  Reason for Refusal: Duplicate      Closing encounter - no further actions needed at this time    Brendan Arriaza RN

## 2018-04-24 DIAGNOSIS — M62.830 BACK MUSCLE SPASM: ICD-10-CM

## 2018-04-24 DIAGNOSIS — M62.838 MUSCLE SPASMS OF BOTH LOWER EXTREMITIES: ICD-10-CM

## 2018-04-24 RX ORDER — BACLOFEN 10 MG/1
TABLET ORAL
Qty: 0.01 TABLET | Refills: 0 | OUTPATIENT
Start: 2018-04-24

## 2018-04-24 NOTE — TELEPHONE ENCOUNTER
Duplicate    Refused Prescriptions:                       Disp   Refills    baclofen (LIORESAL) 10 MG tablet [Pharmacy*0.01 t*0        Sig: TAKE 2 TABLETS BY MOUTH THREE TIMES DAILY  Refused By: BRENDAN ARRIAZA  Reason for Refusal: Duplicate      Closing encounter - no further actions needed at this time    Brendan Arriaza RN

## 2018-04-25 RX ORDER — BACLOFEN 10 MG/1
TABLET ORAL
Qty: 0.01 TABLET | Refills: 0 | OUTPATIENT
Start: 2018-04-25

## 2018-04-25 NOTE — TELEPHONE ENCOUNTER
Called pharmacy to clarify on refills    10/910642 3 month  12/24/2018 3 month supply  Last refill: 3/5/4/2018 - 2 month supply    Maximum daily recommended use: 80 mg    Patient refilling early - should have 2 month supply left refills through 6/19 - if using differently needs office visit or to discuss with clinic - pharmacist verbalized understanding - writer sent denial    Refused Prescriptions:                       Disp   Refills    baclofen (LIORESAL) 10 MG tablet [Pharmacy*0.01 t*0        Sig: TAKE 2 TABLETS BY MOUTH THREE TIMES DAILY  Refused By: BRENDAN ARRIAZA  Reason for Refusal: Inappropriate, get more info      Closing encounter - no further actions needed at this time    Brendan Arriaza RN

## 2018-04-25 NOTE — TELEPHONE ENCOUNTER
BACLOFEN 10 MG TABLET        Last Written Prescription Date:  9/19/2017  Last Fill Quantity: 180 tablet,   # refills: 8  Last Office Visit: 5/9/2017  Future Office visit:       Routing refill request to provider for review/approval because:  Drug not on the [facility], [facility] or [facility] refill protocol or controlled substance

## 2018-05-02 ENCOUNTER — MYC MEDICAL ADVICE (OUTPATIENT)
Dept: FAMILY MEDICINE | Facility: CLINIC | Age: 58
End: 2018-05-02

## 2018-05-12 DIAGNOSIS — I10 HYPERTENSION GOAL BP (BLOOD PRESSURE) < 140/90: ICD-10-CM

## 2018-05-14 NOTE — TELEPHONE ENCOUNTER
"Requested Prescriptions   Pending Prescriptions Disp Refills     hydrochlorothiazide (HYDRODIURIL) 25 MG tablet [Pharmacy Med Name: HYDROCHLOROTHIAZIDE 25 MG TAB]  Last Written Prescription Date:  3/23/2018  Last Fill Quantity: 90 tablet,  # refills: 3   Last Office Visit: 5/9/2017   Future Office Visit:      90 tablet 2     Sig: TAKE 1 TABLET (25 MG) BY MOUTH DAILY    Diuretics (Including Combos) Protocol Failed    5/12/2018  5:03 PM       Failed - Blood pressure under 140/90 in past 12 months    BP Readings from Last 3 Encounters:   02/01/18 162/89   01/25/18 178/80   12/15/17 (P) 173/87          Failed - Recent (12 mo) or future (30 days) visit within the authorizing provider's specialty    Patient had office visit in the last 12 months or has a visit in the next 30 days with authorizing provider or within the authorizing provider's specialty.  See \"Patient Info\" tab in inbasket, or \"Choose Columns\" in Meds & Orders section of the refill encounter.           Passed - Patient is age 18 or older       Passed - Normal serum creatinine on file in past 12 months    Recent Labs   Lab Test  03/14/18   0749   CR  0.73           Passed - Normal serum potassium on file in past 12 months    Recent Labs   Lab Test  03/14/18   0749   POTASSIUM  3.5           Passed - Normal serum sodium on file in past 12 months    Recent Labs   Lab Test  03/14/18   0749   NA  140                "

## 2018-05-16 DIAGNOSIS — M62.838 MUSCLE SPASMS OF BOTH LOWER EXTREMITIES: ICD-10-CM

## 2018-05-16 DIAGNOSIS — M62.830 BACK MUSCLE SPASM: ICD-10-CM

## 2018-05-16 NOTE — TELEPHONE ENCOUNTER
BACLOFEN 10 MG TABLET        Last Written Prescription Date:  9/19/2017  Last Fill Quantity: 180 TABLET,   # refills: 8  Last Office Visit: 5/9/2017  Future Office visit:       Routing refill request to provider for review/approval because:  Drug not on the [facility], [facility] or [facility] refill protocol or controlled substance

## 2018-05-17 RX ORDER — BACLOFEN 10 MG/1
TABLET ORAL
Qty: 180 TABLET | Refills: 0 | Status: SHIPPED | OUTPATIENT
Start: 2018-05-17 | End: 2018-06-15

## 2018-05-17 RX ORDER — HYDROCHLOROTHIAZIDE 25 MG/1
TABLET ORAL
Qty: 90 TABLET | Refills: 2 | OUTPATIENT
Start: 2018-05-17

## 2018-05-24 ENCOUNTER — TELEPHONE (OUTPATIENT)
Dept: ANESTHESIOLOGY | Facility: CLINIC | Age: 58
End: 2018-05-24

## 2018-05-24 NOTE — TELEPHONE ENCOUNTER
Patient left a message  wanting to just confirm that his procedure is still a go with Dr Sauceda on 6/28. Stated that there have been many hiccups and wants to make sure it is still a go for that day. Can be reached at the [phone] number.

## 2018-05-24 NOTE — TELEPHONE ENCOUNTER
I called to let him know that he is still on for 6/28. He did question whether or not he is covered in the [facility].      He will call back if he decides hat he wants to have hid pre op done in PAC

## 2018-05-27 DIAGNOSIS — K74.60 ESOPHAGEAL VARICES IN CIRRHOSIS (H): ICD-10-CM

## 2018-05-27 DIAGNOSIS — I85.10 ESOPHAGEAL VARICES IN CIRRHOSIS (H): ICD-10-CM

## 2018-05-28 NOTE — TELEPHONE ENCOUNTER
"Requested Prescriptions   Pending Prescriptions Disp Refills     propranolol (INDERAL) 20 MG tablet [Pharmacy Med Name: PROPRANOLOL 20 MG TABLET]  Last Written Prescription Date:  6/8/17  Last Fill Quantity: 270,  # refills: 3   Last office visit: 5/9/2017 with prescribing provider:     Future Office Visit:   270 tablet 3     Sig: TAKE 1 TABLET (20 MG) BY MOUTH 3 TIMES DAILY    Beta-Blockers Protocol Failed    5/27/2018  1:19 AM       Failed - Blood pressure under 140/90 in past 12 months    BP Readings from Last 3 Encounters:   02/01/18 162/89   01/25/18 178/80   12/15/17 (P) 173/87          Failed - Recent (12 mo) or future (30 days) visit within the authorizing provider's specialty    Patient had office visit in the last 12 months or has a visit in the next 30 days with authorizing provider or within the authorizing provider's specialty.  See \"Patient Info\" tab in inbasket, or \"Choose Columns\" in Meds & Orders section of the refill encounter.        Passed - Patient is age 6 or older          "

## 2018-05-30 ENCOUNTER — TELEPHONE (OUTPATIENT)
Dept: ANESTHESIOLOGY | Facility: CLINIC | Age: 58
End: 2018-05-30

## 2018-05-30 NOTE — TELEPHONE ENCOUNTER
Received voicemail from patient requesting to speak with April regarding scheduling his pre-op and lab appointments. Patient would like to try and coordinate them all together if possible.

## 2018-06-01 RX ORDER — PROPRANOLOL HYDROCHLORIDE 20 MG/1
TABLET ORAL
Qty: 90 TABLET | Refills: 0 | Status: SHIPPED | OUTPATIENT
Start: 2018-06-01 | End: 2018-10-09

## 2018-06-01 NOTE — TELEPHONE ENCOUNTER
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

## 2018-06-15 DIAGNOSIS — M62.838 MUSCLE SPASMS OF BOTH LOWER EXTREMITIES: ICD-10-CM

## 2018-06-15 DIAGNOSIS — M62.830 BACK MUSCLE SPASM: ICD-10-CM

## 2018-06-15 NOTE — TELEPHONE ENCOUNTER
Requested Prescriptions   Pending Prescriptions Disp Refills     baclofen (LIORESAL) 10 MG tablet [Pharmacy Med Name: BACLOFEN 10 MG TABLET] 180 tablet 0     Sig: TAKE 2 TABLETS BY MOUTH THREE TIMES DAILY    There is no refill protocol information for this order            Last Written Prescription Date:  5/17/18  Last Fill Quantity: 180,   # refills: 0  Last Office Visit: 5/9/17  Future Office visit:       Routing refill request to provider for review/approval because:  Drug not on the [facility], [facility] or [facility] refill protocol or controlled substance

## 2018-06-17 RX ORDER — BACLOFEN 10 MG/1
TABLET ORAL
Qty: 180 TABLET | Refills: 11 | Status: SHIPPED | OUTPATIENT
Start: 2018-06-17 | End: 2019-05-06

## 2018-06-18 ENCOUNTER — ANESTHESIA EVENT (OUTPATIENT)
Dept: SURGERY | Facility: AMBULATORY SURGERY CENTER | Age: 58
End: 2018-06-18

## 2018-06-18 ENCOUNTER — OFFICE VISIT (OUTPATIENT)
Dept: SURGERY | Facility: CLINIC | Age: 58
End: 2018-06-18
Payer: MEDICARE

## 2018-06-18 ENCOUNTER — APPOINTMENT (OUTPATIENT)
Dept: SURGERY | Facility: CLINIC | Age: 58
End: 2018-06-18
Payer: MEDICARE

## 2018-06-18 ENCOUNTER — ALLIED HEALTH/NURSE VISIT (OUTPATIENT)
Dept: SURGERY | Facility: CLINIC | Age: 58
End: 2018-06-18
Payer: MEDICARE

## 2018-06-18 VITALS
OXYGEN SATURATION: 98 % | RESPIRATION RATE: 18 BRPM | HEIGHT: 71 IN | HEART RATE: 59 BPM | TEMPERATURE: 98 F | DIASTOLIC BLOOD PRESSURE: 80 MMHG | SYSTOLIC BLOOD PRESSURE: 163 MMHG | WEIGHT: 224.7 LBS | BODY MASS INDEX: 31.46 KG/M2

## 2018-06-18 DIAGNOSIS — Z01.818 PREOP GENERAL PHYSICAL EXAM: Primary | ICD-10-CM

## 2018-06-18 DIAGNOSIS — Z01.818 PREOP GENERAL PHYSICAL EXAM: ICD-10-CM

## 2018-06-18 LAB
ALBUMIN SERPL-MCNC: 3.8 G/DL (ref 3.4–5)
ALP SERPL-CCNC: 110 U/L (ref 40–150)
ALT SERPL W P-5'-P-CCNC: 20 U/L (ref 0–70)
ANION GAP SERPL CALCULATED.3IONS-SCNC: 8 MMOL/L (ref 3–14)
AST SERPL W P-5'-P-CCNC: 15 U/L (ref 0–45)
BILIRUB SERPL-MCNC: 0.4 MG/DL (ref 0.2–1.3)
BUN SERPL-MCNC: 13 MG/DL (ref 7–30)
CALCIUM SERPL-MCNC: 8.8 MG/DL (ref 8.5–10.1)
CHLORIDE SERPL-SCNC: 104 MMOL/L (ref 94–109)
CO2 SERPL-SCNC: 27 MMOL/L (ref 20–32)
CREAT SERPL-MCNC: 0.65 MG/DL (ref 0.66–1.25)
ERYTHROCYTE [DISTWIDTH] IN BLOOD BY AUTOMATED COUNT: 15.1 % (ref 10–15)
GFR SERPL CREATININE-BSD FRML MDRD: >90 ML/MIN/1.7M2
GLUCOSE SERPL-MCNC: 100 MG/DL (ref 70–99)
HBA1C MFR BLD: 6.2 % (ref 0–5.6)
HCT VFR BLD AUTO: 36.6 % (ref 40–53)
HGB BLD-MCNC: 12 G/DL (ref 13.3–17.7)
INR PPP: 1.08 (ref 0.86–1.14)
MCH RBC QN AUTO: 26.7 PG (ref 26.5–33)
MCHC RBC AUTO-ENTMCNC: 32.8 G/DL (ref 31.5–36.5)
MCV RBC AUTO: 82 FL (ref 78–100)
PLATELET # BLD AUTO: 204 10E9/L (ref 150–450)
POTASSIUM SERPL-SCNC: 3.2 MMOL/L (ref 3.4–5.3)
PROT SERPL-MCNC: 8 G/DL (ref 6.8–8.8)
RBC # BLD AUTO: 4.49 10E12/L (ref 4.4–5.9)
SODIUM SERPL-SCNC: 140 MMOL/L (ref 133–144)
WBC # BLD AUTO: 7.5 10E9/L (ref 4–11)

## 2018-06-18 ASSESSMENT — LIFESTYLE VARIABLES: TOBACCO_USE: 1

## 2018-06-18 NOTE — PATIENT INSTRUCTIONS
Preparing for Your Surgery      Name:  Erwin Aguilar   MRN:  [id number]   :  1960   Today's Date:  2018     Arriving for surgery: Spinal Cord Stimulator Explant/ and Re-implant   Surgery date:  2018  Arrival time:  5:45 am  Please come to:     [facility]  [address] [place] [id number]     Parking is available in front of the [facility] building from 5:30AM to 8:00PM.  -  Proceed to the 5th floor to check into the Ambulatory Surgery Center.              >> There will be patient concierges on the 1st and 5th floor, for assistance or an escort, if you would like.              >> Please call [phone] with any questions.    What can I eat or drink?  -  You may have solid food or milk products until 8 hours prior to your surgery. Nothing after 11 pm on 18  -  You may have water, apple juice or 7up/Sprite until 2 hours prior to your surgery.  Until 5 am     Which medicines can I take?        Stop Aspirin, vitamins and supplements one week prior to surgery.      Hold Ibuprofen and Naproxen for 24 hours prior to surgery.     -  Do NOT take these medications in the morning, the day of surgery:     Hydrochlorothiazide                            Please take 80% of usual insulin dose (22 units) the morning of surgery         -  Please take these medications the day of surgery:     Inhalers as usual and bring to surgery center      Baclofen      Omeprazole      Propranolol     Sertraline         How do I prepare myself?  -  Take two showers: one the night before surgery; and one the morning of surgery.         Use Scrubcare or Hibiclens to wash from neck down.  You may use your own shampoo and conditioner. No other hair products.   -  Do NOT use lotion, powder, deodorant, or antiperspirant the day of your surgery.  -  Do NOT wear any makeup, fingernail polish or jewelry.  - Do not bring your own medications to the hospital, except for  inhalers and eye   drops.  -  Bring your ID and insurance card.    Questions or Concerns:    -If you are scheduled at the Ambulatory Surgery Center please call [phone].    -For questions after surgery please call your surgeons office.

## 2018-06-18 NOTE — H&P
Pre-Operative H & P     CC:  Preoperative exam to assess for increased cardiopulmonary risk while undergoing surgery and anesthesia.    Date of Encounter: 6/18/2018  Primary Care Physician:  Wegener, Joel Daniel Irwin    Reason for visit:  Preop general physical exam  Chronic pain      HPI  Erwin Aguilar is a 57 year old male who presents for pre-operative H & P in preparation for Spinal Cord Stimulator and IPG Explant and Re-Implant of SCS System on 6/28/2018 by Dr. Sauceda in treatment of chronic pain  at [facility].     History is obtained from the patient.   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 He has numbness down legs and sciatica pain down right leg. Right foot drop. His original stimulator was placed in 2009 and now has no battery life. Uses cane, able to walk 2 blocks. DM II with surgery postponed due to need for better glycemic control. He is now followed by [facility] endocrinologist       Past Medical History  Past Medical History:   Diagnosis Date     Actinic keratosis     aldara     Anxiety      Cancer (H)     squamous cell skin CA     Cauda equina spinal cord injury (H)      Chronic sinusitis 5-1-16     Diastasis recti      Esophageal reflux      Esophageal varices in cirrhosis (H) 4/1/2014    Hospitalized for UGI blee 3/28/14, endoscopy revealed bleeding varices.     Essential hypertension, benign      Intermittent asthma      Mild depression (H)      Mixed hyperlipidemia      Nasal polyps 5-1-16     Other chronic pain      SCCA (squamous cell carcinoma) of skin      Seasonal allergic conjunctivitis      Type II or unspecified type diabetes mellitus without mention of complication, not stated as uncontrolled      Unspecified site of spinal cord injury without evidence of spinal bone injury     due to back surgery       Past Surgical History  Past Surgical History:   Procedure Laterality Date     BACK SURGERY  August 2009     C APPENDECTOMY  1974     COLONOSCOPY  N/A 5/12/2016    Procedure: COMBINED COLONOSCOPY, SINGLE OR MULTIPLE BIOPSY/POLYPECTOMY BY BIOPSY;  Surgeon: Ana Paula Urbina MD;  Location: [facility]     ENDOSCOPY UPPER, COLONOSCOPY, COMBINED  10/19/2011    Procedure:COMBINED ENDOSCOPY UPPER, COLONOSCOPY; Upper Endoscopy, Colonoscopy with Polypectomy x4; Surgeon:AMBAR RODRÍGUEZ; Location:[facility] OR     ENT SURGERY  1-2016    Ongoing since then     ESOPHAGOSCOPY, GASTROSCOPY, DUODENOSCOPY (EGD), COMBINED  3/28/2014    Procedure: COMBINED ESOPHAGOSCOPY, GASTROSCOPY, DUODENOSCOPY (EGD);  EGD, Gastric Biopsies, Esophageal Banding;  Surgeon: Reyna Tovar MD;  Location: [facility] OR     ESOPHAGOSCOPY, GASTROSCOPY, DUODENOSCOPY (EGD), COMBINED  6/9/2014    Procedure: COMBINED ESOPHAGOSCOPY, GASTROSCOPY, DUODENOSCOPY (EGD);  Surgeon: Curtis Mendez MD;  Location: [facility]     ESOPHAGOSCOPY, GASTROSCOPY, DUODENOSCOPY (EGD), COMBINED  7/24/2014    Procedure: COMBINED ESOPHAGOSCOPY, GASTROSCOPY, DUODENOSCOPY (EGD);  Surgeon: Gerard Samaniego MD;  Location: [facility]     ESOPHAGOSCOPY, GASTROSCOPY, DUODENOSCOPY (EGD), COMBINED N/A 10/31/2014    Procedure: COMBINED ESOPHAGOSCOPY, GASTROSCOPY, DUODENOSCOPY (EGD);  Surgeon: Gerard Samaniego MD;  Location: [facility]     ESOPHAGOSCOPY, GASTROSCOPY, DUODENOSCOPY (EGD), COMBINED N/A 5/12/2016    Procedure: COMBINED ESOPHAGOSCOPY, GASTROSCOPY, DUODENOSCOPY (EGD);  Surgeon: Ana Paula Urbina MD;  Location: [facility]     HCL SQUAMOUS CELL CARCINOMA AG  10/07    left forearm     HERNIORRHAPHY UMBILICAL  11/8/2012    Procedure: HERNIORRHAPHY UMBILICAL;  Open Umbilical Hernia Repair With Mesh ;  Surgeon: Chase Nicholson MD;  Location: [facility] OR     neuro stimulator  2010     SURGICAL HISTORY OF -   1/02    abcess tooth     SURGICAL HISTORY OF -   1999    L4-5 laminectomy, cauda equina syndrome     SURGICAL HISTORY OF -   +    herniated disk repair     TONSILLECTOMY  10 1964     TRANSPOSITION ULNAR NERVE (ELBOW)      right        Hx of Blood transfusions/reactions: none    Hx of abnormal bleeding or anti-platelet use: none    Menstrual history: No LMP for male patient.:     Steroid use in the last year: none    Personal or FH with difficulty with Anesthesia:  None        Prior to Admission Medications  Current Outpatient Prescriptions   Medication Sig Dispense Refill     albuterol (PROAIR HFA, PROVENTIL HFA, VENTOLIN HFA) 108 (90 BASE) MCG/ACT inhaler Inhale 2 puffs into the lungs every 6 hours as needed for shortness of breath / dyspnea or wheezing Ventolin or other covered brand (Patient taking differently: Inhale 2 puffs into the lungs as needed for shortness of breath / dyspnea or wheezing Ventolin or other covered brand) 3 Inhaler 3     baclofen (LIORESAL) 10 MG tablet TAKE 2 TABLETS BY MOUTH THREE TIMES DAILY 180 tablet 11     BASAGLAR 100 UNIT/ML injection Inject 26 Units Subcutaneous daily (Patient taking differently: Inject 26 Units Subcutaneous every morning ) 30 mL 11     hydrochlorothiazide (HYDRODIURIL) 25 MG tablet TAKE 1 TABLET (25 MG) BY MOUTH DAILY (Patient taking differently: TAKE 1 TABLET (25 MG) BY MOUTH DAILY IN THE MORNING) 90 tablet 3     omeprazole (PRILOSEC) 40 MG capsule Take 1 capsule (40 mg) by mouth daily Take 30-60 minutes before a meal. (Patient taking differently: Take 40 mg by mouth daily as needed Take 30-60 minutes before a meal.) 90 capsule 3     propranolol (INDERAL) 20 MG tablet TAKE 1 TABLET (20 MG) BY MOUTH 3 TIMES DAILY 90 tablet 0     sertraline (ZOLOFT) 100 MG tablet Take 2 tablets (200 mg) by mouth daily (Patient taking differently: Take 200 mg by mouth every morning ) 180 tablet 1     ACE/ARB NOT PRESCRIBED, INTENTIONAL, ACE & ARB not prescribed due to Allergy       Alcohol Swabs (ALCOHOL PADS) 70 % PADS 1 Box 4 times daily 100 each 3     ammonium lactate (LAC-HYDRIN) 12 % cream Apply topically 2 times daily as needed for dry skin (Patient taking differently: Apply topically as needed for  "dry skin ) 385 g 3     BD ROSE U/F 32G X 4 MM insulin pen needle USE ONCE DAILY AS DIRECTED 100 each 11     blood glucose monitoring (NO BRAND SPECIFIED) test strip Use to test blood sugars 1 time daily. ACCU-CHEK KARISHMA BRAND PLEASE. 100 strip 3     blood glucose monitoring (NO BRAND SPECIFIED) test strip Use to test blood sugars one time daily or as directed    Accucheck Richards plus test strips 1 Box 5     blood glucose monitoring (SOFTCLIX) lancets Use to test blood sugar 1 time daily or as directed. 100 each 3     Blood Glucose Monitoring Suppl (ACURA BLOOD GLUCOSE METER) W/DEVICE KIT 1 Dose 2 times daily 1 kit 1     COMPRESSION STOCKINGS Knee high compression socks 30-40-mm 2 each 1     mupirocin (BACTROBAN) 2 % ointment Apply to anterior nares BID X 2 month supply (Patient taking differently: Apply topically as needed Apply to anterior nares BID X 2 month supply) 2 g 3     order for DME Equipment being ordered: wound care supplies  [facility] DUODERM  Extra Thin Dressing - 1 1/4\" x 1 1/2\", Spots, oval  [id number]  Wound measurements  3 cm x 3 cm x 0.1 cm lesions with wound area of 1.5 x 1.5 that has slight drainage, full thickness located on the right sacral region 1 Box 3     order for DME   Location:sacral ulcer unknown etiology possible sheer    measurement: 2.5 cm x 2 cm x 0.1 cm Unlikely to be a  pressure ulcer but if it is it's a stg 2,  partial thickness, low eexudate    Wound supplies :  5 each Comfeel  Plus Transparent 3530 2 x 2 3/4''  5x7 cm  [facility]     wound care orders: cleans wound, dry thoroughly  and replace Comfeel. Leave in place for 7 days for 5 weeks 5 each 3     order for DME Equipment being ordered: one donut for sitting 1 Device 0     ORDER FOR DME Equipment being ordered: BP cuff 1 Device 0     promethazine (PHENERGAN) 25 MG tablet TAKE 1 TABLET (25 MG) BY MOUTH EVERY 6 HOURS AS NEEDED FOR NAUSEA 40 tablet 11     Syringe, Disposable, 25 ML MISC Syringe to be used for nasal irrigation 1 " each 3     [DISCONTINUED] sertraline (ZOLOFT) 100 MG tablet TAKE 2 TABLETS (200 MG) BY MOUTH DAILY 180 tablet 0       Allergies  Allergies   Allergen Reactions     Lisinopril Anaphylaxis     Swollen tongue; inability to swallow; drooling; hives; swollen face, neck, angioedema     Acetaminophen      Hx of cirrhosis      Amlodipine      Swelling, hives, possible angioedema       Keflex [Cephalexin]      Patient reports that he has taken augmentin and tolerated in in 2/2017     Morphine      b/p dropped and arms went numb  Hypotension     Quinolones Hives     Bactrim [Sulfamethoxazole W/Trimethoprim] Rash     Levaquin Swelling and Rash     Swelling in lip and tongue felt thick       Social History  Social History     Social History     Marital status: Single     Spouse name: N/A     Number of children: 0     Years of education: N/A     Occupational History     sales Unemployed     Social History Main Topics     Smoking status: Former Smoker     Packs/day: 0.50     Years: 1.00     Types: Cigarettes     Start date: 10/13/1983     Quit date: 6/9/1984     Smokeless tobacco: Never Used     Alcohol use No      Comment: a pint of alcohol / day - last drink was 3/28/14     Drug use: 2.00 per week     Special: Marijuana      Comment: marijuana - occasional     Sexual activity: Not Currently     Partners: Female, Male     Other Topics Concern     Parent/Sibling W/ Cabg, Mi Or Angioplasty Before 65f 55m? No     Social History Narrative       Family History  Family History   Problem Relation Age of Onset     CANCER Mother      lung     Breast Cancer Mother      Other Cancer Mother      CANCER Father      esophogeal, GERD     DIABETES Brother      amputation, Type 1     DIABETES Brother      DIABETES Brother      Cancer - colorectal No family hx of          Anesthesia Evaluation     . Pt has had prior anesthetic. Type: General and MAC           ROS/MED HX    ENT/Pulmonary:     (+)tobacco use, Past use 0.5 PPD for 1 year, quit 1884  "packs/day  Intermittent asthma Treatment: Inhaler prn,  , . .    Neurologic:     (+)Spinal cord injury year sustained: 2009 level of injury: L5-S1 other neuro nerve injury after back surgery, numbness down both legs and pain in right leg    Cardiovascular:     (+) Dyslipidemia, hypertension----. : . . . :. . Previous cardiac testing date:results:date: results:ECG reviewed date:6/18/2018 results:Sinus dieudonne date: results:          METS/Exercise Tolerance:  3 - Able to walk 1-2 blocks without stopping   Hematologic:  - neg hematologic  ROS       Musculoskeletal:   (+) , , other musculoskeletal- uses cane      GI/Hepatic: Comment: Acute episode of alcoholic hepatitis in 2014, liver enzymes have been normal recently    (+) GERD Asymptomatic on medication, hepatitis type A and Alcoholic,       Renal/Genitourinary:     (+) Other Renal/ Genitourinary, urgency      Endo:     (+) type II DM date: 6/18/2018 Using insulin - not using insulin pump .      Psychiatric:     (+) psychiatric history anxiety and depression      Infectious Disease:  - neg infectious disease ROS       Malignancy:   (+) Malignancy History of Skin  Skin CA status post Surgery,         Other:    (+) No chance of pregnancy C-spine cleared: N/A, H/O Chronic Pain,no other significant disability              Physical Exam  Normal systems: cardiovascular and pulmonary    Airway   Mallampati: II  TM distance: >3 FB  Neck ROM: full    Dental   Comment: Does not have any teeth    Cardiovascular   Rhythm and rate: regular and normal      Pulmonary    breath sounds clear to auscultation          The complete review of systems is negative other than noted in the HPI or here.   Temp: 98  F (36.7  C) Temp src: Oral BP: 163/80 Pulse: 59   Resp: 18 SpO2: 98 %         224 lbs 11.2 oz  5' 11\"   Body mass index is 31.34 kg/(m^2).       Physical Exam  Constitutional: Awake, alert, cooperative, no apparent distress, and appears stated age.  Eyes: Pupils equal, round and " reactive to light, extra ocular muscles intact, sclera clear, conjunctiva normal.  HENT: Normocephalic, oral pharynx with moist mucus membranes, no teeth. No goiter appreciated.   Respiratory: Clear to auscultation bilaterally, no crackles or wheezing.  Cardiovascular: Regular rate and rhythm, normal S1 and S2, and no murmur noted.  Carotids +2, no bruits. No edema. Palpable pulses to radial  DP and PT arteries.   GI: Normal bowel sounds, soft, non-distended, non-tender, no masses palpated, no hepatosplenomegaly.  Surgical scars: back  Lymph/Hematologic: No cervical lymphadenopathy and no supraclavicular lymphadenopathy.  Genitourinary:  deferred   Skin: Warm and dry.  No rashes at anticipated surgical site.   Musculoskeletal: Full ROM of neck. There is no redness, warmth, or swelling of the joints. Gross motor strength is normal.    Neurologic: Awake, alert, oriented to name, place and time. Reports numbness of buttock and both legs, sciatic pain down right leg. Right foot drop  Neuropsychiatric: Calm, cooperative. Normal affect.     Labs: (personally reviewed)  Results for SUSAN CHENG (MRN [id number]) as of 6/18/2018 11:32   Ref. Range 6/18/2018 09:11   Sodium Latest Ref Range: 133 - 144 mmol/L 140   Potassium Latest Ref Range: 3.4 - 5.3 mmol/L 3.2 (L)   Chloride Latest Ref Range: 94 - 109 mmol/L 104   Carbon Dioxide Latest Ref Range: 20 - 32 mmol/L 27   Urea Nitrogen Latest Ref Range: 7 - 30 mg/dL 13   Creatinine Latest Ref Range: 0.66 - 1.25 mg/dL 0.65 (L)   GFR Estimate Latest Ref Range: >60 mL/min/1.7m2 >90   GFR Estimate If Black Latest Ref Range: >60 mL/min/1.7m2 >90   Calcium Latest Ref Range: 8.5 - 10.1 mg/dL 8.8   Anion Gap Latest Ref Range: 3 - 14 mmol/L 8   Albumin Latest Ref Range: 3.4 - 5.0 g/dL 3.8   Protein Total Latest Ref Range: 6.8 - 8.8 g/dL 8.0   Bilirubin Total Latest Ref Range: 0.2 - 1.3 mg/dL 0.4   Alkaline Phosphatase Latest Ref Range: 40 - 150 U/L 110   ALT Latest Ref Range: 0 - 70 U/L  20   AST Latest Ref Range: 0 - 45 U/L 15   Hemoglobin A1C Latest Ref Range: 0 - 5.6 % 6.2 (H)   Glucose Latest Ref Range: 70 - 99 mg/dL 100 (H)   WBC Latest Ref Range: 4.0 - 11.0 10e9/L 7.5   Hemoglobin Latest Ref Range: 13.3 - 17.7 g/dL 12.0 (L)   Hematocrit Latest Ref Range: 40.0 - 53.0 % 36.6 (L)   Platelet Count Latest Ref Range: 150 - 450 10e9/L 204   RBC Count Latest Ref Range: 4.4 - 5.9 10e12/L 4.49   MCV Latest Ref Range: 78 - 100 fl 82   MCH Latest Ref Range: 26.5 - 33.0 pg 26.7   MCHC Latest Ref Range: 31.5 - 36.5 g/dL 32.8   RDW Latest Ref Range: 10.0 - 15.0 % 15.1 (H)   INR Latest Ref Range: 0.86 - 1.14  1.08     Will recheck K prior to surgery      EKG: Personally reviewed but formal cardiology read pendin2018 sinus dieudonne      Outside records reviewed from: care everywhere        ASSESSMENT and PLAN  Erwin Aguilar is a 57 year old male scheduled to undergo Spinal Cord Stimulator and IPG Explant and Re-Implant of SCS System on 2018 by Dr. Sauceda in treatment of spinal cord stimulator placement. He has the following specific operative considerations:   - RCRI : Low serious cardiac risks.  0.4% risk of major adverse cardiac event.   - Anesthesia considerations:  Refer to PAC assessment in anesthesia records  - VTE risk: low risk  - ALISIA # of risks 2/8 = low risk  - Post-op delirium risk: low risk  - Risk of PONV score = 2.  If > 2, anti-emetic intervention recommended.      Previous anesthesia without complications  1) Cardiac: HTN, well managed. EKG today sinus dieudonne. Denies chest pain, PND and orthopnea.  2) Pulmonary: Intermittent asthma, uses albuterol about 3 times a year. Smoked for 1 year, quitting in . Denies pulmonary symptoms  3) GI: GERD, well managed with omeprazole. History of alcohol cirrhosis with acute episode in . He has quit drinking alcohol and has not had further issues.  4) Neuro/MSK: spinal cord injury from back surgery in . He has numbness down legs and  sciatica pain down right leg. Right foot drop. His original stimulator was placed in 2009 and now has no battery life. Uses cane, able to walk 2 blocks.  5) Endo: DM II with surgery postponed due to need for better glycemic control. He is now followed by [facility] endocrinologist     Currently has no teeth  Feasible airway         I spent 30 minutes with patient, greater than 50% educating on preop meds, counseling on anesthesia and coordinating care for spinal cord stimulator placement   Pt optimized for surgery. AVS with information on surgery time/arrival time, meds and NPO status given by nursing staff  Pt is appropriate for ASC      Patient was discussed with Dr Rivas.    VANCE Malik CNS  Preoperative Assessment Center  [facility]  Phone: [phone]  Fax: [phone]

## 2018-06-18 NOTE — MR AVS SNAPSHOT
After Visit Summary   2018    Erwin Aguilar    MRN: [id number]           Patient Information     Date Of Birth          1960        Visit Information        Provider Department      2018 9:00 AM Rn, [facility]        Care Instructions    Preparing for Your Surgery      Name:  Erwin Aguilar   MRN:  [id number]   :  1960   Today's Date:  2018     Arriving for surgery: Spinal Cord Stimulator Explant/ and Re-implant   Surgery date:  2018  Arrival time:  5:45 am  Please come to:     [facility]  [address] [place] [id number]     Parking is available in front of the [facility] building from 5:30AM to 8:00PM.  -  Proceed to the 5th floor to check into the Ambulatory Surgery Center.              >> There will be patient concierges on the 1st and 5th floor, for assistance or an escort, if you would like.              >> Please call [phone] with any questions.    What can I eat or drink?  -  You may have solid food or milk products until 8 hours prior to your surgery. Nothing after 11 pm on 18  -  You may have water, apple juice or 7up/Sprite until 2 hours prior to your surgery.  Until 5 am     Which medicines can I take?        Stop Aspirin, vitamins and supplements one week prior to surgery.      Hold Ibuprofen and Naproxen for 24 hours prior to surgery.     -  Do NOT take these medications in the morning, the day of surgery:     Hydrochlorothiazide                            Please take 80% of usual insulin dose (22 units) the morning of surgery         -  Please take these medications the day of surgery:     Inhalers as usual and bring to surgery center      Baclofen      Omeprazole      Propranolol     Sertraline         How do I prepare myself?  -  Take two showers: one the night before surgery; and one the morning of surgery.         Use Scrubcare or Hibiclens to  wash from neck down.  You may use your own shampoo and conditioner. No other hair products.   -  Do NOT use lotion, powder, deodorant, or antiperspirant the day of your surgery.  -  Do NOT wear any makeup, fingernail polish or jewelry.  - Do not bring your own medications to the hospital, except for inhalers and eye   drops.  -  Bring your ID and insurance card.    Questions or Concerns:    -If you are scheduled at the Ambulatory Surgery Center please call [phone].    -For questions after surgery please call your surgeons office.                     Follow-ups after your visit        Your next 10 appointments already scheduled     Jun 18, 2018  8:00 AM CDT   (Arrive by 7:45 AM)   PAC EVALUATION with  Pac Marck 7   [facility] [place] ([facility])    [address] [place] [address]   [phone]            Jun 18, 2018  9:00 AM CDT   (Arrive by 8:45 AM)   PAC RN ASSESSMENT with  Pac Rn   [facility] [place] ([facility])    [address] [place] [id number]   [phone]            Jun 18, 2018  9:30 AM CDT   (Arrive by 9:15 AM)   PAC Anesthesia Consult with  Pac Anesthesiologist   [facility] [place] ([facility])    [address] [place] [id number]   [phone]            Jun 28, 2018   Procedure with Elvis Sauceda MD   [facility] ([facility])    [address] [place] [address]  5th [facility] [id number] [phone]           Located in the Clinics and Surgery Center at [address] [place] [address].   parking is very convenient and highly recommended.  is a $6 flat rate fee.  Both  and self parkers should enter the main arrival plaza from [facility]; parking attendants will direct you based on  your parking preference.            Jul 11, 2018  7:00 AM CDT   Lab with  LAB   [facility] ([facility])    [address]  1st Floor  [facility] [id number] [phone]            Jul 11, 2018  8:00 AM CDT   (Arrive by 7:45 AM)   Return General Liver with Kimberly Ramirez MD   [facility]atology ([facility])    [address]  [address]  [facility] [address] [phone]              Future tests that were ordered for you today     Open Future Orders        Priority Expected Expires Ordered    EKG 12-lead complete w/read - Clinics Routine 6/18/2018 7/18/2018 6/18/2018            Who to contact     Please call your clinic at [phone] to:    Ask questions about your health    Make or cancel appointments    Discuss your medicines    Learn about your test results    Speak to your doctor            Additional Information About Your Visit        Data Elite Information     Data Elite gives you secure access to your electronic health record. If you see a primary care provider, you can also send messages to your care team and make appointments. If you have questions, please call your primary care clinic.  If you do not have a primary care provider, please call [phone] and they will assist you.      Data Elite is an electronic gateway that provides easy, online access to your medical records. With [employer] Elite, you can request a clinic appointment, read your test results, renew a prescription or communicate with your care team.     To access your existing account, please contact your [facility] or call [phone] for assistance.        Care EveryWhere ID     This is your Care EveryWhere ID. This could be used by other organizations to access your [facility] medical records  [id number]         Blood Pressure from Last 3 Encounters:   06/18/18 163/80   02/01/18 162/89   01/25/18 178/80    Weight from Last 3  Encounters:   06/18/18 101.9 kg (224 lb 11.2 oz)   02/12/18 101.2 kg (223 lb)   02/01/18 101.4 kg (223 lb 9.6 oz)              Today, you had the following     No orders found for display         Today's Medication Changes          These changes are accurate as of 6/18/18  7:58 AM.  If you have any questions, ask your nurse or doctor.               These medicines have changed or have updated prescriptions.        Dose/Directions    albuterol 108 (90 Base) MCG/ACT Inhaler   Commonly known as:  PROAIR HFA/PROVENTIL HFA/VENTOLIN HFA   This may have changed:    - when to take this  - additional instructions   Used for:  Moderate persistent asthma without complication        Dose:  2 puff   Inhale 2 puffs into the lungs every 6 hours as needed for shortness of breath / dyspnea or wheezing Ventolin or other covered brand   Quantity:  3 Inhaler   Refills:  3       ammonium lactate 12 % cream   Commonly known as:  LAC-HYDRIN   This may have changed:  when to take this   Used for:  Xerosis cutis        Apply topically 2 times daily as needed for dry skin   Quantity:  385 g   Refills:  3       BASAGLAR 100 UNIT/ML injection   This may have changed:  when to take this   Used for:  Type 2 diabetes mellitus without complication, with long-term current use of insulin (H)        Dose:  26 Units   Inject 26 Units Subcutaneous daily   Quantity:  30 mL   Refills:  11       hydrochlorothiazide 25 MG tablet   Commonly known as:  HYDRODIURIL   This may have changed:  See the new instructions.   Used for:  Hypertension goal BP (blood pressure) < 140/90        TAKE 1 TABLET (25 MG) BY MOUTH DAILY   Quantity:  90 tablet   Refills:  3       mupirocin 2 % ointment   Commonly known as:  BACTROBAN   This may have changed:    - how to take this  - when to take this  - reasons to take this  - additional instructions   Used for:  Nasal lesion, Nasal vestibulitis        Apply to anterior nares BID X 2 month supply   Quantity:  2 g   Refills:  3        omeprazole 40 MG capsule   Commonly known as:  priLOSEC   This may have changed:    - when to take this  - reasons to take this  - additional instructions   Used for:  Gastroesophageal reflux disease without esophagitis        Dose:  40 mg   Take 1 capsule (40 mg) by mouth daily Take 30-60 minutes before a meal.   Quantity:  90 capsule   Refills:  3       sertraline 100 MG tablet   Commonly known as:  ZOLOFT   This may have changed:  when to take this   Used for:  Generalized anxiety disorder        Dose:  200 mg   Take 2 tablets (200 mg) by mouth daily   Quantity:  180 tablet   Refills:  1                Primary Care Provider Office Phone # Fax #    Demario Rm Irwin Wegener, [id number]  [facility] [address]        Equal Access to Services     LI GENAO : Marco doddo Solissett, waaxda luqadaha, qaybta kaalmada adeegyada, donis allison . So [facility] [phone].    ATENCIÓN: Si habla español, tiene a camp disposición servicios gratuitos de asistencia lingüística. [facility] [phone].    We comply with applicable federal civil rights laws and [place] laws. We do not discriminate on the basis of race, color, national origin, age, disability, sex, sexual orientation, or gender identity.            Thank you!     Thank you for choosing [facility]  for your care. Our goal is always to provide you with excellent care. Hearing back from our patients is one way we can continue to improve our services. Please take a few minutes to complete the written survey that you may receive in the mail after your visit with us. Thank you!             Your Updated Medication List - Protect others around you: Learn how to safely use, store and throw away your medicines at www.disposemymeds.org.          This list is accurate as of 6/18/18  7:58 AM.  Always use your most recent med list.                   Brand Name Dispense  Instructions for use Diagnosis    * ACE/ARB/ARNI NOT PRESCRIBED (INTENTIONAL)      ACE & ARB not prescribed due to Allergy        ACURA BLOOD GLUCOSE METER w/Device Kit     1 kit    1 Dose 2 times daily    Type 2 diabetes, HbA1c goal < 7% (H)       albuterol 108 (90 Base) MCG/ACT Inhaler    PROAIR HFA/PROVENTIL HFA/VENTOLIN HFA    3 Inhaler    Inhale 2 puffs into the lungs every 6 hours as needed for shortness of breath / dyspnea or wheezing Ventolin or other covered brand    Moderate persistent asthma without complication       Alcohol Pads 70 % Pads     100 each    1 Box 4 times daily    Type 2 diabetes, HbA1c goal < 7% (H)       ammonium lactate 12 % cream    LAC-HYDRIN    385 g    Apply topically 2 times daily as needed for dry skin    Xerosis cutis       baclofen 10 MG tablet    LIORESAL    180 tablet    TAKE 2 TABLETS BY MOUTH THREE TIMES DAILY    Muscle spasms of both lower extremities, Back muscle spasm       BASAGLAR 100 UNIT/ML injection     30 mL    Inject 26 Units Subcutaneous daily    Type 2 diabetes mellitus without complication, with long-term current use of insulin (H)       BD ROSE U/F 32G X 4 MM   Generic drug:  insulin pen needle     100 each    USE ONCE DAILY AS DIRECTED    Type 2 diabetes mellitus without complication, with long-term current use of insulin (H)       blood glucose monitoring lancets     100 each    Use to test blood sugar 1 time daily or as directed.    Type 2 diabetes, HbA1c goal < 7% (H)       * blood glucose monitoring test strip    no brand specified    1 Box    Use to test blood sugars one time daily or as directed  Accucheck Richards plus test strips    Type 2 diabetes, HbA1c goal < 7% (H)       * blood glucose monitoring test strip    no brand specified    100 strip    Use to test blood sugars 1 time daily. ACCU-CHEK KARISHMA BRAND PLEASE.    Type 2 diabetes mellitus with diabetic polyneuropathy, with long-term current use of insulin (H)       COMPRESSION STOCKINGS     2 each     "Knee high compression socks 30-40-mm    Lymphedema of both lower extremities       hydrochlorothiazide 25 MG tablet    HYDRODIURIL    90 tablet    TAKE 1 TABLET (25 MG) BY MOUTH DAILY    Hypertension goal BP (blood pressure) < 140/90       mupirocin 2 % ointment    BACTROBAN    2 g    Apply to anterior nares BID X 2 month supply    Nasal lesion, Nasal vestibulitis       omeprazole 40 MG capsule    priLOSEC    90 capsule    Take 1 capsule (40 mg) by mouth daily Take 30-60 minutes before a meal.    Gastroesophageal reflux disease without esophagitis       * order for DME     1 Device    Equipment being ordered: BP cuff    Hypertension goal BP (blood pressure) < 140/90       * order for DME     1 Device    Equipment being ordered: one donut for sitting    Pressure ulcer, stage II       * order for DME     5 each    ?Location:sacral ulcer unknown etiology possible sheer ?measurement: 2.5 cm x 2 cm x 0.1 cm Unlikely to be a  pressure ulcer but if it is it's a stg 2,  partial thickness, low eexudate  Wound supplies : 5 each Comfeel? Plus [id number] x 2 3/4''  5x7 cm  [facility]   wound care orders: cleans wound, dry thoroughly  and replace Comfeel. Leave in place for 7 days for 5 weeks    Wound, open, buttock, right, initial encounter       * order for DME     1 Box    Equipment being ordered: wound care supplies CONVATEC DUODERM  Extra Thin Dressing - 1 1/4\" x 1 1/2\", Spots, oval [id number] Wound measurements 3 cm x 3 cm x 0.1 cm lesions with wound area of 1.5 x 1.5 that has slight drainage, full thickness located on the right sacral region    Benign neoplasm of skin of trunk, except scrotum       promethazine 25 MG tablet    PHENERGAN    40 tablet    TAKE 1 TABLET (25 MG) BY MOUTH EVERY 6 HOURS AS NEEDED FOR NAUSEA    Nausea without vomiting       propranolol 20 MG tablet    INDERAL    90 tablet    TAKE 1 TABLET (20 MG) BY MOUTH 3 TIMES DAILY    Esophageal varices in cirrhosis (H)       sertraline 100 MG tablet    " ZOLOFT    180 tablet    Take 2 tablets (200 mg) by mouth daily    Generalized anxiety disorder       Syringe (Disposable) 25 ML Misc     1 each    Syringe to be used for nasal irrigation    Chronic maxillary sinusitis       * Notice:  This list has 7 medication(s) that are the same as other medications prescribed for you. Read the directions carefully, and ask your doctor or other care provider to review them with you.

## 2018-06-18 NOTE — ANESTHESIA PREPROCEDURE EVALUATION
Anesthesia Evaluation     . Pt has had prior anesthetic. Type: General and MAC           ROS/MED HX    ENT/Pulmonary:     (+)tobacco use, Past use 0.5 PPD for 1 year, quit 1884 packs/day  Intermittent asthma Treatment: Inhaler prn,  , . .    Neurologic:     (+)Spinal cord injury year sustained: 2009 level of injury: L5-S1 other neuro nerve injury after back surgery, numbness down both legs and pain in right leg    Cardiovascular:     (+) Dyslipidemia, hypertension----. : . . . :. . Previous cardiac testing date:results:date: results:ECG reviewed date:6/18/2018 results:Sinus dieudonne date: results:          METS/Exercise Tolerance:  3 - Able to walk 1-2 blocks without stopping   Hematologic:  - neg hematologic  ROS       Musculoskeletal:   (+) , , other musculoskeletal- uses cane      GI/Hepatic: Comment: Acute episode of alcoholic hepatitis in 2014, liver enzymes have been normal recently    (+) GERD Asymptomatic on medication, hepatitis type A and Alcoholic,       Renal/Genitourinary:     (+) Other Renal/ Genitourinary, urgency      Endo:     (+) type II DM Last HgA1c: 6.2 date: 6/18/2018 Using insulin - not using insulin pump .      Psychiatric:     (+) psychiatric history anxiety and depression      Infectious Disease:  - neg infectious disease ROS       Malignancy:   (+) Malignancy History of Skin  Skin CA status post Surgery,         Other:    (+) No chance of pregnancy C-spine cleared: N/A, H/O Chronic Pain,no other significant disability                    Physical Exam  Normal systems: cardiovascular and pulmonary    Airway   Mallampati: II  TM distance: >3 FB  Neck ROM: full    Dental   Comment: Does not have any teeth    Cardiovascular   Rhythm and rate: regular and normal      Pulmonary    breath sounds clear to auscultation    Other findings:   Results for SUSAN CHENG (MRN [id number]) as of 6/18/2018 11:32    6/18/2018 09:11  Sodium: 140  Potassium: 3.2 (L)  Chloride: 104  Carbon Dioxide: 27  Urea  Nitrogen: 13  Creatinine: 0.65 (L)  GFR Estimate: >90  GFR Estimate If Black: >90  Calcium: 8.8  Anion Gap: 8  Albumin: 3.8  Protein Total: 8.0  Bilirubin Total: 0.4  Alkaline Phosphatase: 110  ALT: 20  AST: 15  Hemoglobin A1C: 6.2 (H)  Glucose: 100 (H)  WBC: 7.5  Hemoglobin: 12.0 (L)  Hematocrit: 36.6 (L)  Platelet Count: 204  RBC Count: 4.49  MCV: 82  MCH: 26.7  MCHC: 32.8  RDW: 15.1 (H)  INR: 1.08           PAC Discussion and Assessment    ASA Classification: 3  Case is suitable for: [place]  Anesthetic techniques and relevant risks discussed: MAC with GA as backup  Invasive monitoring and risk discussed: Yes  Types:   Possibility and Risk of blood transfusion discussed: Yes  NPO instructions given:   Additional anesthetic preparation and risks discussed:   Needs early admission to pre-op area:   Other:     PAC Resident/NP Anesthesia Assessment:  Jeff Hood is a 58 yo male scheduled for Spinal Cord Stimulator and IPG Explant and Re-Implant of SCS System on 6/28/2018 by Dr. Sauceda in treatment of spinal cord stimulator placement     Previous anesthesia without complications    1) Cardiac: HTN, well managed. EKG today sinus dieudonne. Denies chest pain, PND and orthopnea.  2) Pulmonary: Intermittent asthma, uses albuterol about 3 times a year. Smoked for 1 year, quitting in 1984. Denies pulmonary symptoms  3) GI: GERD, well managed with omeprazole. History of alcohol cirrhosis with acute episode in 2014. He has quit drinking alcohol and has not had further issues.  4) Neuro/MSK: spinal cord injury from back surgery in 2009. He has numbness down legs and sciatica pain down right leg. Right foot drop. His original stimulator was placed in 2009 and now has no battery life. Uses cane, able to walk 2 blocks.  5) Endo: DM II with surgery postponed due to need for better glycemic control. He is now followed by [facility] endocrinologist     Currently has no teeth  Feasible airway                   Reviewed and Signed by PAC  Mid-Level Provider/Resident  Mid-Level Provider/Resident: VANCE Carreno  Date: 6/18/2018  Time: 0800    Attending Anesthesiologist Anesthesia Assessment:        Anesthesiologist:   Date:   Time:   Pass/Fail:   Disposition:     PAC Pharmacist Assessment:        Pharmacist:   Date:   Time:      Anesthesia Plan      History & Physical Review  History and physical reviewed and following examination; no interval change.    ASA Status:  3 .        Plan for MAC (with GA backup) with Intravenous induction. Maintenance will be TIVA.    PONV prophylaxis:  Ondansetron       Postoperative Care  Postoperative pain management:  IV analgesics.      Consents  Anesthetic plan, risks, benefits and alternatives discussed with:  Patient (Including possibility of intraoperative awareness or recall.)..                  History and physical reviewed; no interval change.     Patient seen, examined, reviewed. See above for details from PAC. Note updated.      Jacinto Ríos  Staff Anesthesiologist  7:03 AM June 28, 2018            .

## 2018-06-26 ENCOUNTER — TELEPHONE (OUTPATIENT)
Dept: ANESTHESIOLOGY | Facility: CLINIC | Age: 58
End: 2018-06-26

## 2018-06-26 DIAGNOSIS — T85.192A: Primary | ICD-10-CM

## 2018-06-26 RX ORDER — CEPHALEXIN 500 MG/1
500 CAPSULE ORAL 4 TIMES DAILY
Qty: 56 CAPSULE | Refills: 0 | Status: SHIPPED | OUTPATIENT
Start: 2018-06-26 | End: 2018-07-24

## 2018-06-26 RX ORDER — CLINDAMYCIN HCL 300 MG
300 CAPSULE ORAL 4 TIMES DAILY
Qty: 56 CAPSULE | Refills: 0 | Status: SHIPPED | OUTPATIENT
Start: 2018-06-26 | End: 2018-07-24

## 2018-06-26 NOTE — TELEPHONE ENCOUNTER
Voice message left for pt to remind him to start prophylactic antibiotic if he did not start it yesterday.

## 2018-06-26 NOTE — TELEPHONE ENCOUNTER
Pt called back to report that he does not have antibiotics and is unsure if he has filled the prescriptions previously before his surgery was rescheduled.  Dr. Sauceda updated.  Verbal orders given by MD for:    Keflex 500mg QID for 14 days  Clindamycin 300mg QID for 14 days.      Dr. Sauceda updated that pt has keflex allergy listed; however, pt has taken Augmentin in 2017 without any issues, and the pt does not specifically remember taking keflex in the past or any adverse reaction.     Pt called back and updated with recommendations.  Pt advised to start keflex only this evening and to watch for symptoms of anaphylaxis, swelling of the tongue, hives, and/or rash.  Pt advised to call 911 if he has anaphylactic symptoms.  Pt advised that if no adverse reaction occurs with keflex dose this evening, then he should start taking clindamycin tomorrow morning along with the keflex QID for 14 days. Pt verbalized understanding of this.

## 2018-06-27 DIAGNOSIS — Z01.818 PREOP GENERAL PHYSICAL EXAM: ICD-10-CM

## 2018-06-27 DIAGNOSIS — K70.30 ALCOHOLIC CIRRHOSIS OF LIVER WITHOUT ASCITES (H): Primary | ICD-10-CM

## 2018-06-27 LAB — POTASSIUM SERPL-SCNC: 3.8 MMOL/L (ref 3.4–5.3)

## 2018-06-27 PROCEDURE — 36415 COLL VENOUS BLD VENIPUNCTURE: CPT | Performed by: NURSE PRACTITIONER

## 2018-06-27 PROCEDURE — 84132 ASSAY OF SERUM POTASSIUM: CPT | Performed by: NURSE PRACTITIONER

## 2018-06-28 ENCOUNTER — SURGERY (OUTPATIENT)
Age: 58
End: 2018-06-28

## 2018-06-28 ENCOUNTER — ANESTHESIA (OUTPATIENT)
Dept: SURGERY | Facility: AMBULATORY SURGERY CENTER | Age: 58
End: 2018-06-28

## 2018-06-28 ENCOUNTER — RADIANT APPOINTMENT (OUTPATIENT)
Dept: RADIOLOGY | Facility: AMBULATORY SURGERY CENTER | Age: 58
End: 2018-06-28
Attending: ANESTHESIOLOGY

## 2018-06-28 ENCOUNTER — HOSPITAL ENCOUNTER (OUTPATIENT)
Facility: AMBULATORY SURGERY CENTER | Age: 58
End: 2018-06-28
Attending: ANESTHESIOLOGY
Payer: MEDICARE

## 2018-06-28 VITALS
HEART RATE: 76 BPM | DIASTOLIC BLOOD PRESSURE: 62 MMHG | TEMPERATURE: 97.4 F | BODY MASS INDEX: 30.96 KG/M2 | RESPIRATION RATE: 18 BRPM | SYSTOLIC BLOOD PRESSURE: 132 MMHG | WEIGHT: 222 LBS | OXYGEN SATURATION: 94 %

## 2018-06-28 DIAGNOSIS — T85.192A: ICD-10-CM

## 2018-06-28 DIAGNOSIS — M96.1 POST LAMINECTOMY SYNDROME: Primary | ICD-10-CM

## 2018-06-28 LAB
GLUCOSE BLDC GLUCOMTR-MCNC: 132 MG/DL (ref 70–99)
GLUCOSE BLDC GLUCOMTR-MCNC: 171 MG/DL (ref 70–99)

## 2018-06-28 PROCEDURE — 88300 SURGICAL PATH GROSS: CPT | Performed by: ANESTHESIOLOGY

## 2018-06-28 RX ORDER — PROPOFOL 10 MG/ML
INJECTION, EMULSION INTRAVENOUS CONTINUOUS PRN
Status: DISCONTINUED | OUTPATIENT
Start: 2018-06-28 | End: 2018-06-28

## 2018-06-28 RX ORDER — GABAPENTIN 300 MG/1
300 CAPSULE ORAL ONCE
Status: COMPLETED | OUTPATIENT
Start: 2018-06-28 | End: 2018-06-28

## 2018-06-28 RX ORDER — ONDANSETRON 2 MG/ML
INJECTION INTRAMUSCULAR; INTRAVENOUS PRN
Status: DISCONTINUED | OUTPATIENT
Start: 2018-06-28 | End: 2018-06-28

## 2018-06-28 RX ORDER — SODIUM CHLORIDE, SODIUM LACTATE, POTASSIUM CHLORIDE, CALCIUM CHLORIDE 600; 310; 30; 20 MG/100ML; MG/100ML; MG/100ML; MG/100ML
INJECTION, SOLUTION INTRAVENOUS CONTINUOUS
Status: DISCONTINUED | OUTPATIENT
Start: 2018-06-28 | End: 2018-06-29 | Stop reason: HOSPADM

## 2018-06-28 RX ORDER — FENTANYL CITRATE 50 UG/ML
25-50 INJECTION, SOLUTION INTRAMUSCULAR; INTRAVENOUS
Status: DISCONTINUED | OUTPATIENT
Start: 2018-06-28 | End: 2018-06-29 | Stop reason: HOSPADM

## 2018-06-28 RX ORDER — LIDOCAINE 40 MG/G
CREAM TOPICAL
Status: DISCONTINUED | OUTPATIENT
Start: 2018-06-28 | End: 2018-06-29 | Stop reason: HOSPADM

## 2018-06-28 RX ORDER — NALOXONE HYDROCHLORIDE 0.4 MG/ML
.1-.4 INJECTION, SOLUTION INTRAMUSCULAR; INTRAVENOUS; SUBCUTANEOUS
Status: DISCONTINUED | OUTPATIENT
Start: 2018-06-28 | End: 2018-06-29 | Stop reason: HOSPADM

## 2018-06-28 RX ORDER — LIDOCAINE HYDROCHLORIDE 20 MG/ML
INJECTION, SOLUTION INFILTRATION; PERINEURAL PRN
Status: DISCONTINUED | OUTPATIENT
Start: 2018-06-28 | End: 2018-06-28

## 2018-06-28 RX ORDER — ONDANSETRON 2 MG/ML
4 INJECTION INTRAMUSCULAR; INTRAVENOUS EVERY 30 MIN PRN
Status: DISCONTINUED | OUTPATIENT
Start: 2018-06-28 | End: 2018-06-29 | Stop reason: HOSPADM

## 2018-06-28 RX ORDER — KETAMINE HYDROCHLORIDE 10 MG/ML
INJECTION INTRAMUSCULAR; INTRAVENOUS PRN
Status: DISCONTINUED | OUTPATIENT
Start: 2018-06-28 | End: 2018-06-28

## 2018-06-28 RX ORDER — MEPERIDINE HYDROCHLORIDE 25 MG/ML
12.5 INJECTION INTRAMUSCULAR; INTRAVENOUS; SUBCUTANEOUS
Status: DISCONTINUED | OUTPATIENT
Start: 2018-06-28 | End: 2018-06-29 | Stop reason: HOSPADM

## 2018-06-28 RX ORDER — DEXAMETHASONE SODIUM PHOSPHATE 4 MG/ML
INJECTION, SOLUTION INTRA-ARTICULAR; INTRALESIONAL; INTRAMUSCULAR; INTRAVENOUS; SOFT TISSUE PRN
Status: DISCONTINUED | OUTPATIENT
Start: 2018-06-28 | End: 2018-06-28

## 2018-06-28 RX ORDER — OXYCODONE HYDROCHLORIDE 5 MG/1
5 TABLET ORAL EVERY 4 HOURS PRN
Status: DISCONTINUED | OUTPATIENT
Start: 2018-06-28 | End: 2018-06-29 | Stop reason: HOSPADM

## 2018-06-28 RX ORDER — OXYCODONE HYDROCHLORIDE 10 MG/1
10 TABLET ORAL EVERY 6 HOURS PRN
Qty: 40 TABLET | Refills: 0 | Status: SHIPPED | OUTPATIENT
Start: 2018-06-28 | End: 2019-04-02

## 2018-06-28 RX ORDER — FENTANYL CITRATE 50 UG/ML
INJECTION, SOLUTION INTRAMUSCULAR; INTRAVENOUS PRN
Status: DISCONTINUED | OUTPATIENT
Start: 2018-06-28 | End: 2018-06-28

## 2018-06-28 RX ORDER — PROPOFOL 10 MG/ML
INJECTION, EMULSION INTRAVENOUS PRN
Status: DISCONTINUED | OUTPATIENT
Start: 2018-06-28 | End: 2018-06-28

## 2018-06-28 RX ORDER — BUPIVACAINE HYDROCHLORIDE 5 MG/ML
INJECTION, SOLUTION PERINEURAL PRN
Status: DISCONTINUED | OUTPATIENT
Start: 2018-06-28 | End: 2018-06-28 | Stop reason: HOSPADM

## 2018-06-28 RX ORDER — GLYCOPYRROLATE 0.2 MG/ML
INJECTION, SOLUTION INTRAMUSCULAR; INTRAVENOUS PRN
Status: DISCONTINUED | OUTPATIENT
Start: 2018-06-28 | End: 2018-06-28

## 2018-06-28 RX ORDER — LABETALOL HYDROCHLORIDE 5 MG/ML
INJECTION, SOLUTION INTRAVENOUS PRN
Status: DISCONTINUED | OUTPATIENT
Start: 2018-06-28 | End: 2018-06-28

## 2018-06-28 RX ORDER — KETOROLAC TROMETHAMINE 30 MG/ML
30 INJECTION, SOLUTION INTRAMUSCULAR; INTRAVENOUS EVERY 6 HOURS PRN
Status: DISCONTINUED | OUTPATIENT
Start: 2018-06-28 | End: 2018-06-29 | Stop reason: HOSPADM

## 2018-06-28 RX ORDER — ONDANSETRON 4 MG/1
4 TABLET, ORALLY DISINTEGRATING ORAL EVERY 30 MIN PRN
Status: DISCONTINUED | OUTPATIENT
Start: 2018-06-28 | End: 2018-06-29 | Stop reason: HOSPADM

## 2018-06-28 RX ORDER — KETOROLAC TROMETHAMINE 30 MG/ML
INJECTION, SOLUTION INTRAMUSCULAR; INTRAVENOUS PRN
Status: DISCONTINUED | OUTPATIENT
Start: 2018-06-28 | End: 2018-06-28

## 2018-06-28 RX ORDER — HYDRALAZINE HYDROCHLORIDE 20 MG/ML
INJECTION INTRAMUSCULAR; INTRAVENOUS PRN
Status: DISCONTINUED | OUTPATIENT
Start: 2018-06-28 | End: 2018-06-28

## 2018-06-28 RX ADMIN — KETOROLAC TROMETHAMINE 30 MG: 30 INJECTION, SOLUTION INTRAMUSCULAR; INTRAVENOUS at 09:41

## 2018-06-28 RX ADMIN — LABETALOL HYDROCHLORIDE 10 MG: 5 INJECTION, SOLUTION INTRAVENOUS at 10:43

## 2018-06-28 RX ADMIN — FENTANYL CITRATE 25 MCG: 50 INJECTION, SOLUTION INTRAMUSCULAR; INTRAVENOUS at 10:31

## 2018-06-28 RX ADMIN — KETAMINE HYDROCHLORIDE 10 MG: 10 INJECTION INTRAMUSCULAR; INTRAVENOUS at 08:07

## 2018-06-28 RX ADMIN — ONDANSETRON 4 MG: 2 INJECTION INTRAMUSCULAR; INTRAVENOUS at 07:50

## 2018-06-28 RX ADMIN — KETAMINE HYDROCHLORIDE 10 MG: 10 INJECTION INTRAMUSCULAR; INTRAVENOUS at 09:17

## 2018-06-28 RX ADMIN — DEXAMETHASONE SODIUM PHOSPHATE 4 MG: 4 INJECTION, SOLUTION INTRA-ARTICULAR; INTRALESIONAL; INTRAMUSCULAR; INTRAVENOUS; SOFT TISSUE at 07:50

## 2018-06-28 RX ADMIN — SODIUM CHLORIDE, SODIUM LACTATE, POTASSIUM CHLORIDE, CALCIUM CHLORIDE: 600; 310; 30; 20 INJECTION, SOLUTION INTRAVENOUS at 07:43

## 2018-06-28 RX ADMIN — FENTANYL CITRATE 25 MCG: 50 INJECTION, SOLUTION INTRAMUSCULAR; INTRAVENOUS at 09:41

## 2018-06-28 RX ADMIN — PROPOFOL 20 MG: 10 INJECTION, EMULSION INTRAVENOUS at 08:07

## 2018-06-28 RX ADMIN — HYDRALAZINE HYDROCHLORIDE 2 MG: 20 INJECTION INTRAMUSCULAR; INTRAVENOUS at 09:24

## 2018-06-28 RX ADMIN — PROPOFOL 100 MCG/KG/MIN: 10 INJECTION, EMULSION INTRAVENOUS at 07:50

## 2018-06-28 RX ADMIN — LIDOCAINE HYDROCHLORIDE 60 MG: 20 INJECTION, SOLUTION INFILTRATION; PERINEURAL at 07:50

## 2018-06-28 RX ADMIN — GLYCOPYRROLATE 0.2 MG: 0.2 INJECTION, SOLUTION INTRAMUSCULAR; INTRAVENOUS at 08:21

## 2018-06-28 RX ADMIN — HYDRALAZINE HYDROCHLORIDE 2 MG: 20 INJECTION INTRAMUSCULAR; INTRAVENOUS at 10:23

## 2018-06-28 RX ADMIN — LABETALOL HYDROCHLORIDE 10 MG: 5 INJECTION, SOLUTION INTRAVENOUS at 11:34

## 2018-06-28 RX ADMIN — FENTANYL CITRATE 25 MCG: 50 INJECTION, SOLUTION INTRAMUSCULAR; INTRAVENOUS at 08:48

## 2018-06-28 RX ADMIN — FENTANYL CITRATE 25 MCG: 50 INJECTION, SOLUTION INTRAMUSCULAR; INTRAVENOUS at 08:19

## 2018-06-28 RX ADMIN — BUPIVACAINE HYDROCHLORIDE 17 ML: 5 INJECTION, SOLUTION PERINEURAL at 08:16

## 2018-06-28 RX ADMIN — ONDANSETRON 4 MG: 2 INJECTION INTRAMUSCULAR; INTRAVENOUS at 11:51

## 2018-06-28 RX ADMIN — PROPOFOL 20 MG: 10 INJECTION, EMULSION INTRAVENOUS at 12:20

## 2018-06-28 RX ADMIN — HYDRALAZINE HYDROCHLORIDE 2 MG: 20 INJECTION INTRAMUSCULAR; INTRAVENOUS at 09:48

## 2018-06-28 RX ADMIN — LABETALOL HYDROCHLORIDE 10 MG: 5 INJECTION, SOLUTION INTRAVENOUS at 11:01

## 2018-06-28 RX ADMIN — GABAPENTIN 300 MG: 300 CAPSULE ORAL at 06:33

## 2018-06-28 RX ADMIN — HYDRALAZINE HYDROCHLORIDE 2 MG: 20 INJECTION INTRAMUSCULAR; INTRAVENOUS at 10:09

## 2018-06-28 RX ADMIN — FENTANYL CITRATE 25 MCG: 50 INJECTION, SOLUTION INTRAMUSCULAR; INTRAVENOUS at 10:58

## 2018-06-28 RX ADMIN — HYDRALAZINE HYDROCHLORIDE 4 MG: 20 INJECTION INTRAMUSCULAR; INTRAVENOUS at 12:11

## 2018-06-28 RX ADMIN — FENTANYL CITRATE 25 MCG: 50 INJECTION, SOLUTION INTRAMUSCULAR; INTRAVENOUS at 08:01

## 2018-06-28 RX ADMIN — PROPOFOL 20 MG: 10 INJECTION, EMULSION INTRAVENOUS at 12:09

## 2018-06-28 RX ADMIN — KETAMINE HYDROCHLORIDE 20 MG: 10 INJECTION INTRAMUSCULAR; INTRAVENOUS at 07:55

## 2018-06-28 RX ADMIN — HYDRALAZINE HYDROCHLORIDE 4 MG: 20 INJECTION INTRAMUSCULAR; INTRAVENOUS at 12:24

## 2018-06-28 RX ADMIN — SODIUM CHLORIDE, SODIUM LACTATE, POTASSIUM CHLORIDE, CALCIUM CHLORIDE: 600; 310; 30; 20 INJECTION, SOLUTION INTRAVENOUS at 12:17

## 2018-06-28 RX ADMIN — PROPOFOL 20 MG: 10 INJECTION, EMULSION INTRAVENOUS at 08:19

## 2018-06-28 RX ADMIN — HYDRALAZINE HYDROCHLORIDE 2 MG: 20 INJECTION INTRAMUSCULAR; INTRAVENOUS at 09:36

## 2018-06-28 RX ADMIN — HYDRALAZINE HYDROCHLORIDE 2 MG: 20 INJECTION INTRAMUSCULAR; INTRAVENOUS at 10:38

## 2018-06-28 NOTE — DISCHARGE INSTRUCTIONS
"Dr Sauceda would like you to come to clinic Monday June 2 at 7:25 for GABRIELA drain removal.  If you note increased drainage in the bulb (more than 20 ml in an hour), call 911 and come to the [facility]. Do not \"strip\" drain or change dressing around GABRIELA drain. Leave all dressings intact until follow up in clinic Monday.    PAIN MANAGEMENT PATIENT  DISCHARGE INSTRUCTIONS FOR PATIENTS WITH SPINAL CORD STIMULATORS    Activities:    Rest for the first 24 hours.    Raise the head of your bed about 20 degrees for the firt post-operative night to stabilize your spine.  The Day After Surgery    Walk for brief periods of time keeping your back as straight as possible to prevent lead movement.    No swimming/hot tubs/lakes for 45 days after implant.    WEAR THE ABDOMINAL BINDER CONTINUOUSLY FOR 7 DAYS, UNTIL POST OPERATIVE APPOINTMENT    Diet:    Start with clear liquids or a light meal.  If you do not become nauseated, gradually progress to a normal diet.    Drink 8-6 oz. glasses of water a day.    NO ALCOHOLIC BEVERAGES.    No smoking, this will delay healing.    Incision Site:    Sutures will be removed on your first post-operative appointment (7-10 days following surgery.  Do not  change dressing.  Keep dressing dry.    YOU WILL EXPERIENCE SOME REDNESS, TENDERNESS AND POSSIBLY SOME SWELLING IN THE AREA OF THE INCISIONS.  THIS IS NORMAL AND SHOULD SUBSIDE AFTER THE FIRST 10-14 DAYS.      Notify the office should there be any concern related to the incisions.    Fever greater than 101 degrees.    Heavy bleeding at incision site.    Take ALL of the oral antibiotics prescribed for you following surgery.    FOR 6-8 WEEKS FOLLOWING SURGERY    Position your bed to maintain body alignment.    Sleep with firm mattress, which equally supposrt your legs and back.    Resume showering and bathing.    Follow your physicians's recommendations regarding sexual activity.    Obtain approvalfrom your physician before having your spine " "manipulated by physical therapy or another physician (the manipulation may disrupt the position of the lead).    Build up you physical strength by walking for brief periods of time each day or engaging in a physical therapy program according to your physicians instruction.    [facility]  Home Care Following Anesthesia  For 24 hours after surgery:  1. Get plenty of rest.  A responsible adult must stay with you for at least 24 hours after you leave the surgery center.  2. Do not drive or use heavy equipment.  If you have weakness or tingling, don't drive or use heavy equipment until this feeling goes away.   3. Do not drink alcohol.   4. Avoid strenuous or risky activities.  Ask for help when climbing stairs.  5. You may feel lightheaded.  IF so, sit for a few minutes before standing.  Have someone help you get up.   6. If you have nausea (feel sick to your stomach): Drink only clear liquids such as apple juice, ginger ale, broth or 7-Up.  Rest may also help.  Be sure to drink enough fluids.  Move to a regular diet as you feel able.   7. You may have a slight fever.  Call the doctor if your fever is over 100 F (37.7 C) (taken under the tongue) or lasts longer than 24 hours.  8. You may have a dry mouth, a sore throat, muscle aches or trouble sleeping. These should go away after 24 hours.  9. Do not make important or legal decisions.        Today you received a Marcaine or bupivacaine block to numb the nerves near your surgery site.  This is a block using local anesthetic or \"numbing\" medication injected around the nerves to anesthetize or \"numb\" the area supplied by those nerves.  This block is injected into the muscle layer near your surgical site.  The medication may numb the location where you had surgery for 6-18 hours, but may last up to 24 hours.  If your surgical site is an arm or leg you should be careful with your affected limb, since it is possible to injure your limb " without being aware of it due to the numbing.  Until full feeling returns, you should guard against bumping or hitting your limb, and avoid extreme hot or cold temperatures on the skin.  As the block wears off, the feeling will return as a tingling or prickly sensation near your surgical site.  You will experience more discomfort from your incision as the feeling returns.  You may want to take a pain pill (a narcotic or Tylenol if this was prescribed by your surgeon) when you start to experience mild pain before the pain beccomes more severe.  If your pain medications do not control your pain you should notifiy your surgeon.    Tips for taking pain medications  To get the best pain relief possible, remember these points:    Take pain medications as directed, before pain becomes severe.    Pain medication can upset your stomach: taking it with food may help.    Constipation is a common side effect of pain medication. Drink plenty of  fluids.    Eat foods high in fiber. Take a stool softener if recommended by your doctor or pharmacist.    Do not drink alcohol, drive or operate machinery while taking pain medications.    Ask about other ways to control pain, such as with heat, ice or relaxation.    Tylenol/Acetaminophen Consumption  To help encourage the safe use of acetaminophen, the makers of TYLENOL  have lowered the maximum daily dose for single-ingredient Extra Strength TYLENOL  (acetaminophen) products sold in the [place] from 8 pills per day (4,000 mg) to 6 pills per day (3,000 mg). The dosing interval has also changed from 2 pills every 4-6 hours to 2 pills every 6 hours.    If you feel your pain relief is insufficient, you may take Tylenol/Acetaminophen in addition to your narcotic pain medication.     Be careful not to exceed 3,000 mg of Tylenol/Acetaminophen in a 24 hour period from all sources.    If you are taking extra strength Tylenol/acetaminophen (500 mg), the maximum dose is 6 tablets in 24 hours.    If  you are taking regular strength acetaminophen (325 mg), the maximum dose is 9 tablets in 24 hours.    Call a doctor for any of the followin. Signs of infection (fever, growing tenderness at the surgery site, a large amount of drainage or bleeding, severe pain, foul-smelling drainage, redness, swelling).  2. It has been over 8 to 10 hours since surgery and you are still not able to urinate (pass water).  3. Headache for over 24 hours.    Your doctor is:  Dr. Elvis Sauceda, Anesthesia Pain Service: [phone]                  Or dial [phone] and ask for the resident on call for:  Anesthesia Pain Service  For emergency care, call the:  [facility]:  [phone] (TTY for hearing impaired: [phone])    Caring for Your Geovani-Browne Drain    You have been discharged with a Geovani-Browne drainage tube. This tube drains fluid from your incision, helping prevent swelling and reducing the risk for infection. The tube is held in place by a few stitches. The drain will be removed when your doctor determines you no longer need it and when the amount of drainage decreases. The color and amount of fluid varies. Right after surgery, the fluid may be bright red and may become clearer over time.   Dressing:    Keep the skin around the tube dry.    If you have a dressing, change it every day.   o Wash your hands.  o Remove the old bandage. Do not use scissors-you may accidentally cut the tube.  o Check for any redness, swelling, drainage, or broken stitches. (Call your doctor with any of these findings).  o Wash your hands again.  o Wet a cotton swab (Q-tip) and clean around the incision and the tube site. Use normal saline solution (salt and water) or soap and water. Start at the tube site and move outward in a circular motion.   o Pat dry.  o Put a new bandage on the incision and tube site. Make the bandage large enough to cover the whole incision area.   o Tape the bandage in place.  o Throw  out old materials and wash your hands.   Home Care:    Tape the tube to the skin below the bandage. Make sure to keep some slack in the tube to keep it from pulling out.     DO NOT sleep on the same side as the tube.    Secure the tube and bulb inside your clothing with a safety pin. This helps keep the tube from being pulled out.     Keep the bulb compressed at all times, except when you empty it.    Empty your drain at least twice a day. Empty it more often if the drain is full.   o Lift the opening of the drain.  o Drain the fluid into a measuring cup.  o Record the amount of fluid each time you empty. Share the information with your doctor at your follow-up visit.   o Squeeze the bulb with your hands until you hear air coming out of the bulb.  o Close the opening.     Tape plastic wrap over the bandage and tube site when you shower.      Stripping  the tube helps keep blood clots from blocking the tube.                         ONLY DO THIS IF YOUR DOCTOR INSTRUCTED YOU TO DO SO!  o Hold the tubing where it leaves the skin with one hand. This keeps it from pulling on the skin.  o Pinch the tubing with the thumb and first finger of your other hand.   o Slowly and firmly pull your thumb and first finger down the tube (squeezing the tube between your fingers). Keep squeezing the tube as you run your fingers towards the bulb. If the pulling hurts or feels like it is coming out of the skin, STOP. Begin again more gently.  o Let go of the tubing with both hands. If the tube is still blocked, repeat these steps three or four times. Make sure that the bulb is compressed so it creates suction.  When to call your doctor:    New or increased pain around the tube    Redness, warmth, or swelling around the incision or tube    Drainage that is foul smelling    Vomiting    Fever over 101 F degrees    Fluid leaking around the tube    Incision seems not to be healing    Stitches become loose    The tube falls out    Drainage that  changes from light pick to dark red    A sudden increase or decrease in the amount of drainage (over 30 ml).  Your drainage record:  Date Time Bulb 1: Amount of drainage (ml or cc) Bulb 2: Amount of drainage (ml or cc) Notes

## 2018-06-28 NOTE — ANESTHESIA CARE TRANSFER NOTE
Patient: Erwin Aguilar    Procedure(s):  Spinal Cord Stimulator and IPG Explant and Re-Implant of SCS System (Leads and IPG) - Wound Class: I-Clean   - Wound Class: I-Clean    Diagnosis: Pain  Diagnosis Additional Information: No value filed.    Anesthesia Type:   MAC     Note:  Airway :Nasal Cannula  Patient transferred to:Phase II  Comments: Report to RN    126/55, 16, 70, 95%Handoff Report: Identifed the Patient, Identified the Reponsible Provider, Reviewed the pertinent medical history, Discussed the surgical course, Reviewed Intra-OP anesthesia mangement and issues during anesthesia, Set expectations for post-procedure period and Allowed opportunity for questions and acknowledgement of understanding      Vitals: (Last set prior to Anesthesia Care Transfer)    CRNA VITALS  6/28/2018 1207 - 6/28/2018 1241      6/28/2018             Resp Rate (set): 10                Electronically Signed By: VANCE Brown CRNA  June 28, 2018  12:41 PM

## 2018-06-28 NOTE — OR NURSING
Pt nauseated on arrival to PACU; meds administered per orders; aromatherapy provided. Pt denies pain. GABRIELA drain to bulb suction per orders; 15 ml out in first hour post op. Instructions given to pt and wife Isabella. Verbalized and demonstrated understanding of cares of drain and dressings. Abdominal binder in place per orders securing dressing and drain. Pt has follow up appt Monday for drain removal. VSS. Nausea improved.

## 2018-06-28 NOTE — IP AVS SNAPSHOT
[facility]    [address] [place] [id number]    Phone:  [phone]    Fax:  [phone]                                       After Visit Summary   6/28/2018    Erwin Aguilar    MRN: [id number]           After Visit Summary Signature Page     I have received my discharge instructions, and my questions have been answered. I have discussed any challenges I see with this plan with the nurse or doctor.    ..........................................................................................................................................  Patient/Patient Representative Signature      ..........................................................................................................................................  Patient Representative Print Name and Relationship to Patient    ..................................................               ................................................  Date                                            Time    ..........................................................................................................................................  Reviewed by Signature/Title    ...................................................              ..............................................  Date                                                            Time

## 2018-06-28 NOTE — IP AVS SNAPSHOT
MRN:[id number]                      After Visit Summary   6/28/2018    Erwin Aguilar    MRN: [id number]           Thank you!     Thank you for choosing [facility] for your care. Our goal is always to provide you with excellent care. Hearing back from our patients is one way we can continue to improve our services. Please take a few minutes to complete the written survey that you may receive in the mail after you visit with us. Thank you!        Patient Information     Date Of Birth          1960        About your hospital stay     You were admitted on:  June 28, 2018 You last received care in the:  [facility]    You were discharged on:  June 28, 2018       Who to Call     For medical emergencies, please call 911.  For non-urgent questions about your medical care, please call your primary care provider or clinic, [phone]  For questions related to your surgery, please call your surgery clinic        Attending Provider     Provider Elvis Mixon MD Anesthesiology       Primary Care Provider Office Phone # Fax #    Demario Daniel Irwin Wegener, [phone]      Your next 10 appointments already scheduled     Jul 02, 2018  7:40 AM CDT   (Arrive by 7:25 AM)   Return Visit with VANCE Benz CNP   [facility] ([facility])    [address] [place] [id number] [phone]            Jul 06, 2018  9:00 AM CDT   (Arrive by 8:45 AM)   Return Visit with VANCE Benz CNP   [facility] ([facility])    [address] [place] [id number]   [phone]            Jul 11, 2018  7:00 AM CDT   Lab with  LAB   [facility] ([facility])    [address] [place] [id number] [phone]        "     Jul 11, 2018  8:00 AM CDT   (Arrive by 7:45 AM)   Return General Liver with Kimberly Ramirez MD   [facility] ([facility])    [address]  [address]  [facility] [id number] [phone]            Jul 27, 2018  8:00 AM CDT   (Arrive by 7:45 AM)   Return Visit with VANCE Benz CNP   [facility] ([facility])    [address]  [address]  [facility] [id number] [phone]              Future tests that were ordered for you     Abdominal dressing duffy/binder                 Further instructions from your care team       Dr Sauceda would like you to come to clinic Monday June 2 at 7:25 for GABRIELA drain removal.  If you note increased drainage in the bulb (more than 20 ml in an hour), call 911 and come to the [facility] Emergency Dept. Do not \"strip\" drain or change dressing around GABRIELA drain. Leave all dressings intact until follow up in clinic Monday.    PAIN MANAGEMENT PATIENT  DISCHARGE INSTRUCTIONS FOR PATIENTS WITH SPINAL CORD STIMULATORS    Activities:    Rest for the first 24 hours.    Raise the head of your bed about 20 degrees for the firt post-operative night to stabilize your spine.  The Day After Surgery    Walk for brief periods of time keeping your back as straight as possible to prevent lead movement.    No swimming/hot tubs/lakes for 45 days after implant.    WEAR THE ABDOMINAL BINDER CONTINUOUSLY FOR 7 DAYS, UNTIL POST OPERATIVE APPOINTMENT    Diet:    Start with clear liquids or a light meal.  If you do not become nauseated, gradually progress to a normal diet.    Drink 8-6 oz. glasses of water a day.    NO ALCOHOLIC BEVERAGES.    No smoking, this will delay healing.    Incision Site:    Sutures will be removed on your first post-operative appointment (7-10 days following surgery.  Do not  change dressing.  Keep dressing dry.    YOU WILL EXPERIENCE SOME REDNESS, " TENDERNESS AND POSSIBLY SOME SWELLING IN THE AREA OF THE INCISIONS.  THIS IS NORMAL AND SHOULD SUBSIDE AFTER THE FIRST 10-14 DAYS.      Notify the office should there be any concern related to the incisions.    Fever greater than 101 degrees.    Heavy bleeding at incision site.    Take ALL of the oral antibiotics prescribed for you following surgery.    FOR 6-8 WEEKS FOLLOWING SURGERY    Position your bed to maintain body alignment.    Sleep with firm mattress, which equally supposrt your legs and back.    Resume showering and bathing.    Follow your physicians's recommendations regarding sexual activity.    Obtain approvalfrom your physician before having your spine manipulated by physical therapy or another physician (the manipulation may disrupt the position of the lead).    Build up you physical strength by walking for brief periods of time each day or engaging in a physical therapy program according to your physicians instruction.    [facility]  Home Care Following Anesthesia  For 24 hours after surgery:  1. Get plenty of rest.  A responsible adult must stay with you for at least 24 hours after you leave the surgery center.  2. Do not drive or use heavy equipment.  If you have weakness or tingling, don't drive or use heavy equipment until this feeling goes away.   3. Do not drink alcohol.   4. Avoid strenuous or risky activities.  Ask for help when climbing stairs.  5. You may feel lightheaded.  IF so, sit for a few minutes before standing.  Have someone help you get up.   6. If you have nausea (feel sick to your stomach): Drink only clear liquids such as apple juice, ginger ale, broth or 7-Up.  Rest may also help.  Be sure to drink enough fluids.  Move to a regular diet as you feel able.   7. You may have a slight fever.  Call the doctor if your fever is over 100 F (37.7 C) (taken under the tongue) or lasts longer than 24 hours.  8. You may have a dry mouth, a sore throat,  "muscle aches or trouble sleeping. These should go away after 24 hours.  9. Do not make important or legal decisions.        Today you received a Marcaine or bupivacaine block to numb the nerves near your surgery site.  This is a block using local anesthetic or \"numbing\" medication injected around the nerves to anesthetize or \"numb\" the area supplied by those nerves.  This block is injected into the muscle layer near your surgical site.  The medication may numb the location where you had surgery for 6-18 hours, but may last up to 24 hours.  If your surgical site is an arm or leg you should be careful with your affected limb, since it is possible to injure your limb without being aware of it due to the numbing.  Until full feeling returns, you should guard against bumping or hitting your limb, and avoid extreme hot or cold temperatures on the skin.  As the block wears off, the feeling will return as a tingling or prickly sensation near your surgical site.  You will experience more discomfort from your incision as the feeling returns.  You may want to take a pain pill (a narcotic or Tylenol if this was prescribed by your surgeon) when you start to experience mild pain before the pain beccomes more severe.  If your pain medications do not control your pain you should notifiy your surgeon.    Tips for taking pain medications  To get the best pain relief possible, remember these points:    Take pain medications as directed, before pain becomes severe.    Pain medication can upset your stomach: taking it with food may help.    Constipation is a common side effect of pain medication. Drink plenty of  fluids.    Eat foods high in fiber. Take a stool softener if recommended by your doctor or pharmacist.    Do not drink alcohol, drive or operate machinery while taking pain medications.    Ask about other ways to control pain, such as with heat, ice or relaxation.    Tylenol/Acetaminophen Consumption  To help encourage the safe " use of acetaminophen, the makers of TYLENOL  have lowered the maximum daily dose for single-ingredient Extra Strength TYLENOL  (acetaminophen) products sold in the [place] from 8 pills per day (4,000 mg) to 6 pills per day (3,000 mg). The dosing interval has also changed from 2 pills every 4-6 hours to 2 pills every 6 hours.    If you feel your pain relief is insufficient, you may take Tylenol/Acetaminophen in addition to your narcotic pain medication.     Be careful not to exceed 3,000 mg of Tylenol/Acetaminophen in a 24 hour period from all sources.    If you are taking extra strength Tylenol/acetaminophen (500 mg), the maximum dose is 6 tablets in 24 hours.    If you are taking regular strength acetaminophen (325 mg), the maximum dose is 9 tablets in 24 hours.    Call a doctor for any of the followin. Signs of infection (fever, growing tenderness at the surgery site, a large amount of drainage or bleeding, severe pain, foul-smelling drainage, redness, swelling).  2. It has been over 8 to 10 hours since surgery and you are still not able to urinate (pass water).  3. Headache for over 24 hours.    Your doctor is:  Dr. Elvis Sauceda, Anesthesia Pain Service: [phone]                  Or dial [phone] and ask for the resident on call for:  Anesthesia Pain Service  For emergency care, call the:  [facility]:  [phone] (TTY for hearing impaired: [phone])    Caring for Your Geovani-Browne Drain    You have been discharged with a Geovani-Browne drainage tube. This tube drains fluid from your incision, helping prevent swelling and reducing the risk for infection. The tube is held in place by a few stitches. The drain will be removed when your doctor determines you no longer need it and when the amount of drainage decreases. The color and amount of fluid varies. Right after surgery, the fluid may be bright red and may become clearer over time.   Dressing:    Keep the skin around the  tube dry.    If you have a dressing, change it every day.   o Wash your hands.  o Remove the old bandage. Do not use scissors-you may accidentally cut the tube.  o Check for any redness, swelling, drainage, or broken stitches. (Call your doctor with any of these findings).  o Wash your hands again.  o Wet a cotton swab (Q-tip) and clean around the incision and the tube site. Use normal saline solution (salt and water) or soap and water. Start at the tube site and move outward in a circular motion.   o Pat dry.  o Put a new bandage on the incision and tube site. Make the bandage large enough to cover the whole incision area.   o Tape the bandage in place.  o Throw out old materials and wash your hands.   Home Care:    Tape the tube to the skin below the bandage. Make sure to keep some slack in the tube to keep it from pulling out.     DO NOT sleep on the same side as the tube.    Secure the tube and bulb inside your clothing with a safety pin. This helps keep the tube from being pulled out.     Keep the bulb compressed at all times, except when you empty it.    Empty your drain at least twice a day. Empty it more often if the drain is full.   o Lift the opening of the drain.  o Drain the fluid into a measuring cup.  o Record the amount of fluid each time you empty. Share the information with your doctor at your follow-up visit.   o Squeeze the bulb with your hands until you hear air coming out of the bulb.  o Close the opening.     Tape plastic wrap over the bandage and tube site when you shower.      Stripping  the tube helps keep blood clots from blocking the tube.                         ONLY DO THIS IF YOUR DOCTOR INSTRUCTED YOU TO DO SO!  o Hold the tubing where it leaves the skin with one hand. This keeps it from pulling on the skin.  o Pinch the tubing with the thumb and first finger of your other hand.   o Slowly and firmly pull your thumb and first finger down the tube (squeezing the tube between your  fingers). Keep squeezing the tube as you run your fingers towards the bulb. If the pulling hurts or feels like it is coming out of the skin, STOP. Begin again more gently.  o Let go of the tubing with both hands. If the tube is still blocked, repeat these steps three or four times. Make sure that the bulb is compressed so it creates suction.  When to call your doctor:    New or increased pain around the tube    Redness, warmth, or swelling around the incision or tube    Drainage that is foul smelling    Vomiting    Fever over 101 F degrees    Fluid leaking around the tube    Incision seems not to be healing    Stitches become loose    The tube falls out    Drainage that changes from light pick to dark red    A sudden increase or decrease in the amount of drainage (over 30 ml).  Your drainage record:  Date Time Bulb 1: Amount of drainage (ml or cc) Bulb 2: Amount of drainage (ml or cc) Notes                                                                                                              Pending Results     No orders found from 6/26/2018 to 6/29/2018.            Admission Information     Date & Time Provider Department Dept. Phone    6/28/2018 Elvis Sauceda MD [facility] [phone]      Your Vitals Were     Blood Pressure Pulse Temperature Respirations Weight Pulse Oximetry    130/68 58 97.3  F (36.3  C) (Oral) 14 100.7 kg (222 lb) 94%    BMI (Body Mass Index)                   30.96 kg/m2           [facility] Information     [employer] gives you secure access to your electronic health record. If you see a primary care provider, you can also send messages to your care team and make appointments. If you have questions, please call your primary care clinic.  If you do not have a primary care provider, please call [phone] and they will assist you.      [employer] is an electronic gateway that provides easy, online access to your medical records. With [employer], you  can request a clinic appointment, read your test results, renew a prescription or communicate with your care team.     To access your existing account, please contact your [facility] or call [phone] for assistance.        Care EveryWhere ID     This is your Care EveryWhere ID. This could be used by other organizations to access your [facility] medical records  [id number]        Equal Access to Services     [facility] : Hadii mary garcia hadsaul Solissett, waaxda luqadaha, qaybta kaalmada adesolisricardoda, donis murosangeetamiguel allison . So [facility] [phone].    ATENCIÓN: Si habla español, tiene a acmp disposición servicios gratuitos de asistencia lingüística. Neftaly al [phone].    We comply with applicable federal civil rights laws and [place] laws. We do not discriminate on the basis of race, color, national origin, age, disability, sex, sexual orientation, or gender identity.               Review of your medicines      START taking        Dose / Directions    oxyCODONE IR 10 MG tablet   Commonly known as:  ROXICODONE   Used for:  Post laminectomy syndrome        Dose:  10 mg   Take 1 tablet (10 mg) by mouth every 6 hours as needed for moderate to severe pain   Quantity:  40 tablet   Refills:  0         CONTINUE these medicines which may have CHANGED, or have new prescriptions. If we are uncertain of the size of tablets/capsules you have at home, strength may be listed as something that might have changed.        Dose / Directions    albuterol 108 (90 Base) MCG/ACT Inhaler   Commonly known as:  PROAIR HFA/PROVENTIL HFA/VENTOLIN HFA   This may have changed:    - when to take this  - additional instructions   Used for:  Moderate persistent asthma without complication        Dose:  2 puff   Inhale 2 puffs into the lungs every 6 hours as needed for shortness of breath / dyspnea or wheezing Ventolin or other covered brand   Quantity:  3 Inhaler   Refills:  3       ammonium lactate  12 % cream   Commonly known as:  LAC-HYDRIN   This may have changed:  when to take this   Used for:  Xerosis cutis        Apply topically 2 times daily as needed for dry skin   Quantity:  385 g   Refills:  3       BASAGLAR 100 UNIT/ML injection   This may have changed:  when to take this   Used for:  Type 2 diabetes mellitus without complication, with long-term current use of insulin (H)        Dose:  26 Units   Inject 26 Units Subcutaneous daily   Quantity:  30 mL   Refills:  11       hydrochlorothiazide 25 MG tablet   Commonly known as:  HYDRODIURIL   This may have changed:  See the new instructions.   Used for:  Hypertension goal BP (blood pressure) < 140/90        TAKE 1 TABLET (25 MG) BY MOUTH DAILY   Quantity:  90 tablet   Refills:  3       mupirocin 2 % ointment   Commonly known as:  BACTROBAN   This may have changed:    - how to take this  - when to take this  - reasons to take this  - additional instructions   Used for:  Nasal lesion, Nasal vestibulitis        Apply to anterior nares BID X 2 month supply   Quantity:  2 g   Refills:  3       omeprazole 40 MG capsule   Commonly known as:  priLOSEC   This may have changed:    - when to take this  - reasons to take this  - additional instructions   Used for:  Gastroesophageal reflux disease without esophagitis        Dose:  40 mg   Take 1 capsule (40 mg) by mouth daily Take 30-60 minutes before a meal.   Quantity:  90 capsule   Refills:  3       sertraline 100 MG tablet   Commonly known as:  ZOLOFT   This may have changed:  when to take this   Used for:  Generalized anxiety disorder        Dose:  200 mg   Take 2 tablets (200 mg) by mouth daily   Quantity:  180 tablet   Refills:  1         CONTINUE these medicines which have NOT CHANGED        Dose / Directions    * ACE/ARB/ARNI NOT PRESCRIBED (INTENTIONAL)        ACE & ARB not prescribed due to Allergy   Refills:  0       ACURA BLOOD GLUCOSE METER w/Device Kit   Used for:  Type 2 diabetes, HbA1c goal < 7% (H)         Dose:  1 Dose   1 Dose 2 times daily   Quantity:  1 kit   Refills:  1       Alcohol Pads 70 % Pads   Used for:  Type 2 diabetes, HbA1c goal < 7% (H)        Dose:  1 Box   1 Box 4 times daily   Quantity:  100 each   Refills:  3       baclofen 10 MG tablet   Commonly known as:  LIORESAL   Used for:  Muscle spasms of both lower extremities, Back muscle spasm        TAKE 2 TABLETS BY MOUTH THREE TIMES DAILY   Quantity:  180 tablet   Refills:  11       BD ROSE U/F 32G X 4 MM   Used for:  Type 2 diabetes mellitus without complication, with long-term current use of insulin (H)   Generic drug:  insulin pen needle        USE ONCE DAILY AS DIRECTED   Quantity:  100 each   Refills:  11       blood glucose monitoring lancets   Used for:  Type 2 diabetes, HbA1c goal < 7% (H)        Use to test blood sugar 1 time daily or as directed.   Quantity:  100 each   Refills:  3       * blood glucose monitoring test strip   Commonly known as:  no brand specified   Used for:  Type 2 diabetes, HbA1c goal < 7% (H)        Use to test blood sugars one time daily or as directed  Accucheck Richards plus test strips   Quantity:  1 Box   Refills:  5       * blood glucose monitoring test strip   Commonly known as:  no brand specified   Used for:  Type 2 diabetes mellitus with diabetic polyneuropathy, with long-term current use of insulin (H)        Use to test blood sugars 1 time daily. ACCU-CHEK KARISHMA BRAND PLEASE.   Quantity:  100 strip   Refills:  3       cephALEXin 500 MG capsule   Commonly known as:  KEFLEX   Used for:  Failure of spinal cord stimulator (H)        Dose:  500 mg   Take 1 capsule (500 mg) by mouth 4 times daily   Quantity:  56 capsule   Refills:  0       clindamycin 300 MG capsule   Commonly known as:  CLEOCIN   Used for:  Failure of spinal cord stimulator (H)        Dose:  300 mg   Take 1 capsule (300 mg) by mouth 4 times daily   Quantity:  56 capsule   Refills:  0       COMPRESSION STOCKINGS   Used for:  Lymphedema of both  "lower extremities        Knee high compression socks 30-40-mm   Quantity:  2 each   Refills:  1       * order for DME   Used for:  Hypertension goal BP (blood pressure) < 140/90        Equipment being ordered: BP cuff   Quantity:  1 Device   Refills:  0       * order for DME   Used for:  Pressure ulcer, stage II        Equipment being ordered: one donut for sitting   Quantity:  1 Device   Refills:  0       * order for DME   Used for:  Wound, open, buttock, right, initial encounter        ?Location:sacral ulcer unknown etiology possible sheer ?measurement: 2.5 cm x 2 cm x 0.1 cm Unlikely to be a  pressure ulcer but if it is it's a stg 2,  partial thickness, low eexudate  Wound supplies : 5 each Comfeel? Plus [id number] x 2 3/4''  5x7 cm  [facility]   wound care orders: cleans wound, dry thoroughly  and replace Comfeel. Leave in place for 7 days for 5 weeks   Quantity:  5 each   Refills:  3       * order for DME   Used for:  Benign neoplasm of skin of trunk, except scrotum        Equipment being ordered: wound care supplies CONVATEC DUODERM  Extra Thin Dressing - 1 1/4\" x 1 1/2\", Spots, oval [id number] Wound measurements 3 cm x 3 cm x 0.1 cm lesions with wound area of 1.5 x 1.5 that has slight drainage, full thickness located on the right sacral region   Quantity:  1 Box   Refills:  3       promethazine 25 MG tablet   Commonly known as:  PHENERGAN   Used for:  Nausea without vomiting        TAKE 1 TABLET (25 MG) BY MOUTH EVERY 6 HOURS AS NEEDED FOR NAUSEA   Quantity:  40 tablet   Refills:  11       propranolol 20 MG tablet   Commonly known as:  INDERAL   Used for:  Esophageal varices in cirrhosis (H)        TAKE 1 TABLET (20 MG) BY MOUTH 3 TIMES DAILY   Quantity:  90 tablet   Refills:  0       Syringe (Disposable) 25 ML Misc   Used for:  Chronic maxillary sinusitis        Syringe to be used for nasal irrigation   Quantity:  1 each   Refills:  3       * Notice:  This list has 7 medication(s) that are the same as " other medications prescribed for you. Read the directions carefully, and ask your doctor or other care provider to review them with you.         Where to get your medicines      Some of these will need a paper prescription and others can be bought over the counter. Ask your nurse if you have questions.     Bring a paper prescription for each of these medications     oxyCODONE IR 10 MG tablet                Protect others around you: Learn how to safely use, store and throw away your medicines at www.disposemymeds.org.        Information about OPIOIDS     PRESCRIPTION OPIOIDS: WHAT YOU NEED TO KNOW   We gave you an opioid (narcotic) pain medicine. It is important to manage your pain, but opioids are not always the best choice. You should first try all the other options your care team gave you. Take this medicine for as short a time (and as few doses) as possible.     These medicines have risks:    DO NOT drive when on new or higher doses of pain medicine. These medicines can affect your alertness and reaction times, and you could be arrested for driving under the influence (DUI). If you need to use opioids long-term, talk to your care team about driving.    DO NOT operate heave machinery    DO NOT do any other dangerous activities while taking these medicines.     DO NOT drink any alcohol while taking these medicines.      If the opioid prescribed includes acetaminophen, DO NOT take with any other medicines that contain acetaminophen. Read all labels carefully. Look for the word  acetaminophen  or  Tylenol.  Ask your pharmacist if you have questions or are unsure.    You can get addicted to pain medicines, especially if you have a history of addiction (chemical, alcohol or substance dependence). Talk to your care team about ways to reduce this risk.    Store your pills in a secure place, locked if possible. We will not replace any lost or stolen medicine. If you don t finish your medicine, please throw away (dispose)  as directed by your pharmacist. The [place] Pollution Control Agency has more information about safe disposal: https://www.pca.[facility].[place].us/living-green/managing-unwanted-medications.     All opioids tend to cause constipation. Drink plenty of water and eat foods that have a lot of fiber, such as fruits, vegetables, prune juice, apple juice and high-fiber cereal. Take a laxative (Miralax, milk of magnesia, Colace, Senna) if you don t move your bowels at least every other day.              Medication List: This is a list of all your medications and when to take them. Check marks below indicate your daily home schedule. Keep this list as a reference.      Medications           Morning Afternoon Evening Bedtime As Needed    * ACE/ARB/ARNI NOT PRESCRIBED (INTENTIONAL)   ACE & ARB not prescribed due to Allergy                                ACURA BLOOD GLUCOSE METER w/Device Kit   1 Dose 2 times daily                                albuterol 108 (90 Base) MCG/ACT Inhaler   Commonly known as:  PROAIR HFA/PROVENTIL HFA/VENTOLIN HFA   Inhale 2 puffs into the lungs every 6 hours as needed for shortness of breath / dyspnea or wheezing Ventolin or other covered brand                                Alcohol Pads 70 % Pads   1 Box 4 times daily                                ammonium lactate 12 % cream   Commonly known as:  LAC-HYDRIN   Apply topically 2 times daily as needed for dry skin                                baclofen 10 MG tablet   Commonly known as:  LIORESAL   TAKE 2 TABLETS BY MOUTH THREE TIMES DAILY                                BASAGLAR 100 UNIT/ML injection   Inject 26 Units Subcutaneous daily                                BD ROSE U/F 32G X 4 MM   USE ONCE DAILY AS DIRECTED   Generic drug:  insulin pen needle                                blood glucose monitoring lancets   Use to test blood sugar 1 time daily or as directed.                                * blood glucose monitoring test strip   Commonly known  "as:  no brand specified   Use to test blood sugars one time daily or as directed  Accucheck Richards plus test strips                                * blood glucose monitoring test strip   Commonly known as:  no brand specified   Use to test blood sugars 1 time daily. ACCU-CHEK KARISHMA BRAND PLEASE.                                cephALEXin 500 MG capsule   Commonly known as:  KEFLEX   Take 1 capsule (500 mg) by mouth 4 times daily                                clindamycin 300 MG capsule   Commonly known as:  CLEOCIN   Take 1 capsule (300 mg) by mouth 4 times daily                                COMPRESSION STOCKINGS   Knee high compression socks 30-40-mm                                hydrochlorothiazide 25 MG tablet   Commonly known as:  HYDRODIURIL   TAKE 1 TABLET (25 MG) BY MOUTH DAILY                                mupirocin 2 % ointment   Commonly known as:  BACTROBAN   Apply to anterior nares BID X 2 month supply                                omeprazole 40 MG capsule   Commonly known as:  priLOSEC   Take 1 capsule (40 mg) by mouth daily Take 30-60 minutes before a meal.                                * order for DME   Equipment being ordered: BP cuff                                * order for DME   Equipment being ordered: one donut for sitting                                * order for DME   ?Location:sacral ulcer unknown etiology possible sheer ?measurement: 2.5 cm x 2 cm x 0.1 cm Unlikely to be a  pressure ulcer but if it is it's a stg 2,  partial thickness, low eexudate  Wound supplies : 5 each Comfeel? Plus [id number] x 2 3/4''  5x7 cm  [facility]   wound care orders: cleans wound, dry thoroughly  and replace Comfeel. Leave in place for 7 days for 5 weeks                                * order for DME   Equipment being ordered: wound care supplies CONVATEC DUODERM  Extra Thin Dressing - 1 1/4\" x 1 1/2\", Spots, oval [id number] Wound measurements 3 cm x 3 cm x 0.1 cm lesions with wound area of 1.5 x 1.5 " that has slight drainage, full thickness located on the right sacral region                                oxyCODONE IR 10 MG tablet   Commonly known as:  ROXICODONE   Take 1 tablet (10 mg) by mouth every 6 hours as needed for moderate to severe pain                                promethazine 25 MG tablet   Commonly known as:  PHENERGAN   TAKE 1 TABLET (25 MG) BY MOUTH EVERY 6 HOURS AS NEEDED FOR NAUSEA                                propranolol 20 MG tablet   Commonly known as:  INDERAL   TAKE 1 TABLET (20 MG) BY MOUTH 3 TIMES DAILY                                sertraline 100 MG tablet   Commonly known as:  ZOLOFT   Take 2 tablets (200 mg) by mouth daily                                Syringe (Disposable) 25 ML Misc   Syringe to be used for nasal irrigation                                * Notice:  This list has 7 medication(s) that are the same as other medications prescribed for you. Read the directions carefully, and ask your doctor or other care provider to review them with you.

## 2018-06-29 ENCOUNTER — TELEPHONE (OUTPATIENT)
Dept: ANESTHESIOLOGY | Facility: CLINIC | Age: 58
End: 2018-06-29

## 2018-06-29 NOTE — TELEPHONE ENCOUNTER
Pt rescheduled with Dr. Sauceda at 1000 fpr 1 week post op f/u due to APRN not being in clinic.  Pt advised that he is being added onto his schedule and may have an extended wait time to see Dr. Sauceda; however, he can meet with [employer] rep first. Pt verbalized understanding.     Paula Vega, RN, BSN

## 2018-06-29 NOTE — OP NOTE
PROCEDURE: SPINAL CORD STIMULATOR EXPLANT AND RE-IMPLANTATION ([employer])    OPERATION: Percutaneous SCS leads, extension mechanisms and IPG explantation followed by Percutaneous SCS lead implantation x 2 and [employer] Intellis system implantable pulse generator.    ANESTHESIA: MAC    PREOPERATIVE DIAGNOSIS: Failed Spinal Cord Stimulator System, Lead Damage    POSTOPERATIVE DIAGNOSIS: Failed Spinal Cord Stimulator System, Lead Damage    PREOPERATIVE ANTIBIOTICS: 2g Ancef IV given 30 minutes prior to incision, see anesthesia record for time.    OPERATIVE PROCEDURE: The patient was brought to the operating room and was placed in the prone position. The patient was prepped and draped in the usual sterile fashion with DuraPrep two times. With fluoroscopic guidance the location of the desired site for placement of the percutaneous leads was identified and local anesthetic consisting of 2% Lidocaine and 0.5% Bupivacaine with 1:100,000 epinephrine was injected subcutaneously over the area. An approximately 9cm incision was made in the midline. Fluoroscopy was used to identify the location of the extension mechanisms and looped extension leads in the subcutaneous tissue, above Manuelito's Fascia. The incision was dissected down to Manuelito's Fascia and the in-situ silk sutures were cut with a 15-blade. Afterwards, blunt dissection with curved Metzenbaum scissors was carefully performed to expose the extension leads looped in the subcutaneous tissue, above the right lumbar paraspinous musculature. During this dissection, the surgical field began to ooze diffusely from the bluntly dissected scar tissue. The leads and extensions were able to be successfully removed. Hemostasis was achieved after approximately 30 minutes with a combination of Surgicel, Gelfoam and Manual Pressure. Although hemostasis was achieved, the tissue was extremely friable.     At this point, an incision was made over the existing IPG and it was immediately  exposed after incision with a 10-blade. The measured depth of the current indwelling IPG was 6mm beneath the skin. The IPG was not sutured and was easily removed with Livia forceps. I then proceed to dissect deeper with blunt dissection to Manuelito's Fascia. Dull curved Metzenbaum scissors were used. Hemostasis was established and a self-retaining retractor was used to allow adequate visualization. A pocket for the new, Intellis IPG was created, deep to the adipose tissue, above Manuelito's Fascia.    A 14-gauge Tuohy was then introduced with the paraspinous approach under fluoroscopic guidance into the L2-L3 interlaminar space. The entry of the Tuohy into the epidural space was verified by using loss of resistance to saline with a glass 5 mL syringe. The LEFT lead was passed through the needle and advanced up to top T11 with fluoroscopic guidance. The lead encountered resistance within the epidural space upon attempt to advance it more cephalad. A second lead was then placed in identical fashion on the RIGHT and placed at top T12. The lead also encountered resistance within the epidural space upon attempt to advance it more cephalad. The leads were then anchored to the supraspinous ligaments with #2-0 Ethibond suture using the anchoring devices. The leads were then reconnected to the screening cable and retested for position and placement. The tunneling was then done between the lead and the pocket site after the administration of additional local anesthetic subcutaneously. The tunneling tool with a wedged tip attachment was introduced subcutaneously from the lead incision and guided to the pocket. The leads were inserted into the tunneling catheter and advanced to the pocket site. The leads were then inserted into the implantable pulse generator and the screws were tightened with the hexwrench. The generator was then introduced into the pocket and remote tested to ensure adequate placement of the leads into the IPG.  Both surgical wounds were then irrigated with Vancomycin solution. At this point, minimal oozing was still appreciated from the midline incision. The decision was made to place a GABRIELA drain. A 15-Tajik Geovani-Browne drain was placed in the midline incision and hooked up to bulb suction. Thewounds were then closed with #2-0 Vicryl suture. This was followed-up with a subcuticular #3-0 Vicryl suture and a #4-0 Monocryl running stitch on the IPG site. The midline incision was closed with a interrupted, baseball #2-0 Nylon stitch. Sterile dressing were applied.     Specimens collected: Previous IPG sent for Pathology.  Drains: 15-Tajik GABRIELA Drain to bulb suction  EBL: 200mL    The patient was discharged to PACU in stable condition.    Plan:    1. Discharge home with GABRIELA Drain to bulb suction  2. Abdominal binder to be worn continuously  3. Follow-up in Clinic on Monday July 2 at 7:40am for GABRIELA Drain removal  4. Follow-up in Clinic on Thursday July 6 or Friday July 7 for post-op wound check and suture removal appointment ([employer] Representative to be present)

## 2018-06-30 ENCOUNTER — APPOINTMENT (OUTPATIENT)
Dept: GENERAL RADIOLOGY | Facility: CLINIC | Age: 58
DRG: 369 | End: 2018-06-30
Attending: EMERGENCY MEDICINE
Payer: MEDICARE

## 2018-06-30 ENCOUNTER — HOSPITAL ENCOUNTER (INPATIENT)
Facility: CLINIC | Age: 58
LOS: 2 days | Discharge: HOME | DRG: 369 | End: 2018-07-02
Attending: EMERGENCY MEDICINE | Admitting: PEDIATRICS
Payer: MEDICARE

## 2018-06-30 DIAGNOSIS — K92.0 HEMATEMESIS WITH NAUSEA: ICD-10-CM

## 2018-06-30 LAB
ABO + RH BLD: NORMAL
ABO + RH BLD: NORMAL
ALBUMIN SERPL-MCNC: 3.8 G/DL (ref 3.4–5)
ALP SERPL-CCNC: 97 U/L (ref 40–150)
ALT SERPL W P-5'-P-CCNC: 21 U/L (ref 0–70)
ANION GAP SERPL CALCULATED.3IONS-SCNC: 7 MMOL/L (ref 3–14)
AST SERPL W P-5'-P-CCNC: 18 U/L (ref 0–45)
BASOPHILS # BLD AUTO: 0.1 10E9/L (ref 0–0.2)
BASOPHILS NFR BLD AUTO: 0.6 %
BILIRUB SERPL-MCNC: 0.6 MG/DL (ref 0.2–1.3)
BLD GP AB SCN SERPL QL: NORMAL
BLOOD BANK CMNT PATIENT-IMP: NORMAL
BUN SERPL-MCNC: 14 MG/DL (ref 7–30)
CALCIUM SERPL-MCNC: 8.7 MG/DL (ref 8.5–10.1)
CHLORIDE SERPL-SCNC: 104 MMOL/L (ref 94–109)
CO2 SERPL-SCNC: 28 MMOL/L (ref 20–32)
CREAT SERPL-MCNC: 0.63 MG/DL (ref 0.66–1.25)
DIFFERENTIAL METHOD BLD: ABNORMAL
EOSINOPHIL # BLD AUTO: 0.4 10E9/L (ref 0–0.7)
EOSINOPHIL NFR BLD AUTO: 3.2 %
ERYTHROCYTE [DISTWIDTH] IN BLOOD BY AUTOMATED COUNT: 15.8 % (ref 10–15)
GASTROCULT GAST QL: POSITIVE
GFR SERPL CREATININE-BSD FRML MDRD: >90 ML/MIN/1.7M2
GLUCOSE BLDC GLUCOMTR-MCNC: 122 MG/DL (ref 70–99)
GLUCOSE SERPL-MCNC: 120 MG/DL (ref 70–99)
HCT VFR BLD AUTO: 35 % (ref 40–53)
HGB BLD-MCNC: 11.7 G/DL (ref 13.3–17.7)
IMM GRANULOCYTES # BLD: 0 10E9/L (ref 0–0.4)
IMM GRANULOCYTES NFR BLD: 0.2 %
INR PPP: 1.13 (ref 0.86–1.14)
LIPASE SERPL-CCNC: 102 U/L (ref 73–393)
LYMPHOCYTES # BLD AUTO: 1.9 10E9/L (ref 0.8–5.3)
LYMPHOCYTES NFR BLD AUTO: 14.9 %
MCH RBC QN AUTO: 27 PG (ref 26.5–33)
MCHC RBC AUTO-ENTMCNC: 33.4 G/DL (ref 31.5–36.5)
MCV RBC AUTO: 81 FL (ref 78–100)
MONOCYTES # BLD AUTO: 1.4 10E9/L (ref 0–1.3)
MONOCYTES NFR BLD AUTO: 11.4 %
NEUTROPHILS # BLD AUTO: 8.8 10E9/L (ref 1.6–8.3)
NEUTROPHILS NFR BLD AUTO: 69.7 %
NRBC # BLD AUTO: 0 10*3/UL
NRBC BLD AUTO-RTO: 0 /100
PH GAST: 1 PH
PLATELET # BLD AUTO: 228 10E9/L (ref 150–450)
POTASSIUM SERPL-SCNC: 3.3 MMOL/L (ref 3.4–5.3)
PROT SERPL-MCNC: 7.8 G/DL (ref 6.8–8.8)
RBC # BLD AUTO: 4.33 10E12/L (ref 4.4–5.9)
SODIUM SERPL-SCNC: 140 MMOL/L (ref 133–144)
SPECIMEN EXP DATE BLD: NORMAL
WBC # BLD AUTO: 12.6 10E9/L (ref 4–11)

## 2018-06-30 PROCEDURE — 80053 COMPREHEN METABOLIC PANEL: CPT | Performed by: EMERGENCY MEDICINE

## 2018-06-30 PROCEDURE — 86850 RBC ANTIBODY SCREEN: CPT | Performed by: EMERGENCY MEDICINE

## 2018-06-30 PROCEDURE — 86901 BLOOD TYPING SEROLOGIC RH(D): CPT | Performed by: EMERGENCY MEDICINE

## 2018-06-30 PROCEDURE — 99285 EMERGENCY DEPT VISIT HI MDM: CPT | Mod: Z6 | Performed by: EMERGENCY MEDICINE

## 2018-06-30 PROCEDURE — 85610 PROTHROMBIN TIME: CPT | Performed by: EMERGENCY MEDICINE

## 2018-06-30 PROCEDURE — 83690 ASSAY OF LIPASE: CPT | Performed by: PEDIATRICS

## 2018-06-30 PROCEDURE — 12000001 ZZH R&B MED SURG/OB UMMC

## 2018-06-30 PROCEDURE — 99221 1ST HOSP IP/OBS SF/LOW 40: CPT | Mod: AI | Performed by: PEDIATRICS

## 2018-06-30 PROCEDURE — 86900 BLOOD TYPING SEROLOGIC ABO: CPT | Performed by: EMERGENCY MEDICINE

## 2018-06-30 PROCEDURE — 85025 COMPLETE CBC W/AUTO DIFF WBC: CPT | Performed by: EMERGENCY MEDICINE

## 2018-06-30 PROCEDURE — 25000131 ZZH RX MED GY IP 250 OP 636 PS 637: Mod: GY | Performed by: PEDIATRICS

## 2018-06-30 PROCEDURE — 25000128 H RX IP 250 OP 636: Performed by: EMERGENCY MEDICINE

## 2018-06-30 PROCEDURE — 25000128 H RX IP 250 OP 636: Performed by: PEDIATRICS

## 2018-06-30 PROCEDURE — 82271 OCCULT BLOOD OTHER SOURCES: CPT | Performed by: EMERGENCY MEDICINE

## 2018-06-30 PROCEDURE — 99285 EMERGENCY DEPT VISIT HI MDM: CPT | Mod: 25

## 2018-06-30 PROCEDURE — 96361 HYDRATE IV INFUSION ADD-ON: CPT

## 2018-06-30 PROCEDURE — 36415 COLL VENOUS BLD VENIPUNCTURE: CPT | Performed by: PEDIATRICS

## 2018-06-30 PROCEDURE — 25000125 ZZHC RX 250: Performed by: EMERGENCY MEDICINE

## 2018-06-30 PROCEDURE — 71046 X-RAY EXAM CHEST 2 VIEWS: CPT

## 2018-06-30 PROCEDURE — 96375 TX/PRO/DX INJ NEW DRUG ADDON: CPT

## 2018-06-30 PROCEDURE — 96376 TX/PRO/DX INJ SAME DRUG ADON: CPT

## 2018-06-30 PROCEDURE — 00000146 ZZHCL STATISTIC GLUCOSE BY METER IP

## 2018-06-30 PROCEDURE — 96374 THER/PROPH/DIAG INJ IV PUSH: CPT

## 2018-06-30 RX ORDER — NICOTINE POLACRILEX 4 MG
15-30 LOZENGE BUCCAL
Status: DISCONTINUED | OUTPATIENT
Start: 2018-06-30 | End: 2018-07-02 | Stop reason: HOSPADM

## 2018-06-30 RX ORDER — POTASSIUM CL/LIDO/0.9 % NACL 10MEQ/0.1L
10 INTRAVENOUS SOLUTION, PIGGYBACK (ML) INTRAVENOUS
Status: DISCONTINUED | OUTPATIENT
Start: 2018-06-30 | End: 2018-07-02 | Stop reason: HOSPADM

## 2018-06-30 RX ORDER — CEFTRIAXONE 1 G/1
1 INJECTION, POWDER, FOR SOLUTION INTRAMUSCULAR; INTRAVENOUS EVERY 24 HOURS
Status: DISCONTINUED | OUTPATIENT
Start: 2018-06-30 | End: 2018-07-02

## 2018-06-30 RX ORDER — POTASSIUM CHLORIDE 750 MG/1
20-40 TABLET, EXTENDED RELEASE ORAL
Status: DISCONTINUED | OUTPATIENT
Start: 2018-06-30 | End: 2018-07-02 | Stop reason: HOSPADM

## 2018-06-30 RX ORDER — BACLOFEN 10 MG/1
10 TABLET ORAL 3 TIMES DAILY
Status: DISCONTINUED | OUTPATIENT
Start: 2018-06-30 | End: 2018-07-02 | Stop reason: HOSPADM

## 2018-06-30 RX ORDER — POTASSIUM CHLORIDE 29.8 MG/ML
20 INJECTION INTRAVENOUS
Status: DISCONTINUED | OUTPATIENT
Start: 2018-06-30 | End: 2018-07-02 | Stop reason: HOSPADM

## 2018-06-30 RX ORDER — METOCLOPRAMIDE HYDROCHLORIDE 5 MG/ML
10 INJECTION INTRAMUSCULAR; INTRAVENOUS ONCE
Status: COMPLETED | OUTPATIENT
Start: 2018-06-30 | End: 2018-06-30

## 2018-06-30 RX ORDER — POTASSIUM CHLORIDE 7.45 MG/ML
10 INJECTION INTRAVENOUS
Status: DISCONTINUED | OUTPATIENT
Start: 2018-06-30 | End: 2018-07-02 | Stop reason: HOSPADM

## 2018-06-30 RX ORDER — PROCHLORPERAZINE 25 MG
25 SUPPOSITORY, RECTAL RECTAL EVERY 12 HOURS PRN
Status: DISCONTINUED | OUTPATIENT
Start: 2018-06-30 | End: 2018-07-02 | Stop reason: HOSPADM

## 2018-06-30 RX ORDER — ACETAMINOPHEN 325 MG/1
325 TABLET ORAL EVERY 4 HOURS PRN
Status: DISCONTINUED | OUTPATIENT
Start: 2018-06-30 | End: 2018-07-02 | Stop reason: HOSPADM

## 2018-06-30 RX ORDER — PROCHLORPERAZINE MALEATE 5 MG
10 TABLET ORAL EVERY 6 HOURS PRN
Status: DISCONTINUED | OUTPATIENT
Start: 2018-06-30 | End: 2018-07-02 | Stop reason: HOSPADM

## 2018-06-30 RX ORDER — DEXTROSE MONOHYDRATE 25 G/50ML
25-50 INJECTION, SOLUTION INTRAVENOUS
Status: DISCONTINUED | OUTPATIENT
Start: 2018-06-30 | End: 2018-07-02 | Stop reason: HOSPADM

## 2018-06-30 RX ORDER — POTASSIUM CHLORIDE 1.5 G/1.58G
20-40 POWDER, FOR SOLUTION ORAL
Status: DISCONTINUED | OUTPATIENT
Start: 2018-06-30 | End: 2018-07-02 | Stop reason: HOSPADM

## 2018-06-30 RX ORDER — METOCLOPRAMIDE HYDROCHLORIDE 5 MG/ML
10 INJECTION INTRAMUSCULAR; INTRAVENOUS EVERY 6 HOURS PRN
Status: DISCONTINUED | OUTPATIENT
Start: 2018-06-30 | End: 2018-07-02 | Stop reason: HOSPADM

## 2018-06-30 RX ORDER — ONDANSETRON 2 MG/ML
4 INJECTION INTRAMUSCULAR; INTRAVENOUS EVERY 30 MIN PRN
Status: DISCONTINUED | OUTPATIENT
Start: 2018-06-30 | End: 2018-07-02 | Stop reason: HOSPADM

## 2018-06-30 RX ORDER — ONDANSETRON 2 MG/ML
4 INJECTION INTRAMUSCULAR; INTRAVENOUS EVERY 6 HOURS PRN
Status: DISCONTINUED | OUTPATIENT
Start: 2018-06-30 | End: 2018-07-02 | Stop reason: HOSPADM

## 2018-06-30 RX ORDER — METOCLOPRAMIDE 5 MG/1
10 TABLET ORAL EVERY 6 HOURS PRN
Status: DISCONTINUED | OUTPATIENT
Start: 2018-06-30 | End: 2018-07-02 | Stop reason: HOSPADM

## 2018-06-30 RX ORDER — SODIUM CHLORIDE AND POTASSIUM CHLORIDE 150; 900 MG/100ML; MG/100ML
INJECTION, SOLUTION INTRAVENOUS CONTINUOUS
Status: DISCONTINUED | OUTPATIENT
Start: 2018-06-30 | End: 2018-06-30

## 2018-06-30 RX ORDER — SODIUM CHLORIDE 9 MG/ML
1000 INJECTION, SOLUTION INTRAVENOUS CONTINUOUS
Status: DISCONTINUED | OUTPATIENT
Start: 2018-06-30 | End: 2018-07-02

## 2018-06-30 RX ORDER — SERTRALINE HYDROCHLORIDE 100 MG/1
200 TABLET, FILM COATED ORAL DAILY
Status: DISCONTINUED | OUTPATIENT
Start: 2018-07-01 | End: 2018-07-02 | Stop reason: HOSPADM

## 2018-06-30 RX ORDER — NALOXONE HYDROCHLORIDE 0.4 MG/ML
.1-.4 INJECTION, SOLUTION INTRAMUSCULAR; INTRAVENOUS; SUBCUTANEOUS
Status: DISCONTINUED | OUTPATIENT
Start: 2018-06-30 | End: 2018-07-02 | Stop reason: HOSPADM

## 2018-06-30 RX ORDER — ONDANSETRON 4 MG/1
4 TABLET, ORALLY DISINTEGRATING ORAL EVERY 6 HOURS PRN
Status: DISCONTINUED | OUTPATIENT
Start: 2018-06-30 | End: 2018-07-02 | Stop reason: HOSPADM

## 2018-06-30 RX ADMIN — ONDANSETRON 4 MG: 2 INJECTION INTRAMUSCULAR; INTRAVENOUS at 17:10

## 2018-06-30 RX ADMIN — SODIUM CHLORIDE 1000 ML: 9 INJECTION, SOLUTION INTRAVENOUS at 17:37

## 2018-06-30 RX ADMIN — SODIUM CHLORIDE 1000 ML: 9 INJECTION, SOLUTION INTRAVENOUS at 16:30

## 2018-06-30 RX ADMIN — PANTOPRAZOLE SODIUM 40 MG: 40 INJECTION, POWDER, FOR SOLUTION INTRAVENOUS at 17:00

## 2018-06-30 RX ADMIN — SODIUM CHLORIDE 8 MG/HR: 9 INJECTION, SOLUTION INTRAVENOUS at 17:03

## 2018-06-30 RX ADMIN — CEFTRIAXONE 1 G: 1 INJECTION, POWDER, FOR SOLUTION INTRAMUSCULAR; INTRAVENOUS at 20:43

## 2018-06-30 RX ADMIN — METOCLOPRAMIDE 10 MG: 5 INJECTION, SOLUTION INTRAMUSCULAR; INTRAVENOUS at 18:28

## 2018-06-30 RX ADMIN — SODIUM CHLORIDE 8 MG/HR: 9 INJECTION, SOLUTION INTRAVENOUS at 23:02

## 2018-06-30 RX ADMIN — PROCHLORPERAZINE EDISYLATE 10 MG: 5 INJECTION INTRAMUSCULAR; INTRAVENOUS at 20:42

## 2018-06-30 RX ADMIN — ONDANSETRON 4 MG: 2 INJECTION INTRAMUSCULAR; INTRAVENOUS at 16:29

## 2018-06-30 RX ADMIN — OCTREOTIDE ACETATE 50 MCG/HR: 100 INJECTION, SOLUTION INTRAVENOUS; SUBCUTANEOUS at 23:06

## 2018-06-30 ASSESSMENT — ENCOUNTER SYMPTOMS
NAUSEA: 1
DIARRHEA: 0
VOMITING: 1

## 2018-06-30 NOTE — ED PROVIDER NOTES
History     Chief Complaint   Patient presents with     Vomiting     BIBA from home with c/o sudden onset of bloody emesis.  Pt reports that he is POD#2 of Neuro Stimulator exchange.     The history is provided by the patient and medical records.     Erwin Aguilar is a 57 year old male with a history of neuropathy in diabetes II , neurogenic bladder, alcoholic cirrhosis, GERD, HTN, and lumbar radiculopathy who presents to the Emergency Department with complaints of hematemesis. The patient reports nausea 6/28 and 6/29 and today he vomited blood after drinking a protein drink. He vomited in a garbage bag and brought it to the ED. The patient is s/p spinal cord stimulatory and IPG explant and re-implant of SCS system on 6/28/18 for which he was discharged with clindamycin. He believes that the antibiotic triggered the nausea and vomiting. He states he is never nauseous.He reports a low potassium during the surgery so we was given a potassium supplement that made his nausated. He reports no bowel movements today, and he has not eaten today. He denies being on blood thinners. It is noted that the patient had a spinal cord injury from back surgery in 2009 for which he now has numbness down legs and sciatic pain pain down his right leg since.     I have reviewed the Medications, Allergies, Past Medical and Surgical History, and Social History in the [employer] system.  Past Medical History:   Diagnosis Date     Actinic keratosis     aldara     Anxiety      Cancer (H)     squamous cell skin CA     Cauda equina spinal cord injury (H)      Chronic sinusitis 5-1-16     Diastasis recti      Esophageal reflux      Esophageal varices in cirrhosis (H) 4/1/2014    Hospitalized for UGI blee 3/28/14, endoscopy revealed bleeding varices.     Essential hypertension, benign      Intermittent asthma      Mild depression (H)      Mixed hyperlipidemia      Nasal polyps 5-1-16     Other chronic pain      SCCA (squamous cell carcinoma)  of skin      Seasonal allergic conjunctivitis      Type II or unspecified type diabetes mellitus without mention of complication, not stated as uncontrolled      Unspecified site of spinal cord injury without evidence of spinal bone injury     due to back surgery       Past Surgical History:   Procedure Laterality Date     BACK SURGERY  August 2009     C APPENDECTOMY  1974     COLONOSCOPY N/A 5/12/2016    Procedure: COMBINED COLONOSCOPY, SINGLE OR MULTIPLE BIOPSY/POLYPECTOMY BY BIOPSY;  Surgeon: Ana Paula Urbina MD;  Location:  GI     ENDOSCOPY UPPER, COLONOSCOPY, COMBINED  10/19/2011    Procedure:COMBINED ENDOSCOPY UPPER, COLONOSCOPY; Upper Endoscopy, Colonoscopy with Polypectomy x4; Surgeon:AMBAR RODRÍGUEZ; Location: OR     ENT SURGERY  1-2016    Ongoing since then     ESOPHAGOSCOPY, GASTROSCOPY, DUODENOSCOPY (EGD), COMBINED  3/28/2014    Procedure: COMBINED ESOPHAGOSCOPY, GASTROSCOPY, DUODENOSCOPY (EGD);  EGD, Gastric Biopsies, Esophageal Banding;  Surgeon: Reyna Tovar MD;  Location:  OR     ESOPHAGOSCOPY, GASTROSCOPY, DUODENOSCOPY (EGD), COMBINED  6/9/2014    Procedure: COMBINED ESOPHAGOSCOPY, GASTROSCOPY, DUODENOSCOPY (EGD);  Surgeon: Curtis Mendez MD;  Location:  GI     ESOPHAGOSCOPY, GASTROSCOPY, DUODENOSCOPY (EGD), COMBINED  7/24/2014    Procedure: COMBINED ESOPHAGOSCOPY, GASTROSCOPY, DUODENOSCOPY (EGD);  Surgeon: Gerard Samaniego MD;  Location:  OR     ESOPHAGOSCOPY, GASTROSCOPY, DUODENOSCOPY (EGD), COMBINED N/A 10/31/2014    Procedure: COMBINED ESOPHAGOSCOPY, GASTROSCOPY, DUODENOSCOPY (EGD);  Surgeon: Gerard Samaniego MD;  Location:  OR     ESOPHAGOSCOPY, GASTROSCOPY, DUODENOSCOPY (EGD), COMBINED N/A 5/12/2016    Procedure: COMBINED ESOPHAGOSCOPY, GASTROSCOPY, DUODENOSCOPY (EGD);  Surgeon: Ana Paula Urbina MD;  Location:  GI     HCL SQUAMOUS CELL CARCINOMA AG  10/07    left forearm     HERNIORRHAPHY UMBILICAL  11/8/2012    Procedure:  HERNIORRHAPHY UMBILICAL;  Open Umbilical Hernia Repair With Mesh ;  Surgeon: Chase Nicholson MD;  Location: [facility] OR     INSERT STIMULATOR DORSAL COLUMN N/A 6/28/2018    Procedure: INSERT STIMULATOR DORSAL COLUMN;;  Surgeon: Elvis Sauceda MD;  Location: [facility] OR     neuro stimulator  2010     REMOVE GENERATOR STIMULATOR (LOCATION) N/A 6/28/2018    Procedure: REMOVE GENERATOR STIMULATOR (LOCATION);  Spinal Cord Stimulator and IPG Explant and Re-Implant of SCS System (Leads and IPG);  Surgeon: Elvis Sauceda MD;  Location: [facility] OR     SURGICAL HISTORY OF -   1/02    abcess tooth     SURGICAL HISTORY OF -   1999    L4-5 laminectomy, cauda equina syndrome     SURGICAL HISTORY OF -   +    herniated disk repair     TONSILLECTOMY  10 1964     TRANSPOSITION ULNAR NERVE (ELBOW)      right       Family History   Problem Relation Age of Onset     Cancer Mother      lung     Breast Cancer Mother      Other Cancer Mother      Cancer Father      esophogeal, GERD     Diabetes Brother      amputation, Type 1     Diabetes Brother      Diabetes Brother      Cancer - colorectal No family hx of        Social History   Substance Use Topics     Smoking status: Former Smoker     Packs/day: 0.50     Years: 1.00     Types: Cigarettes     Start date: 10/13/1983     Quit date: 6/9/1984     Smokeless tobacco: Never Used     Alcohol use No      Comment: a pint of alcohol / day - last drink was 3/28/14       Current Facility-Administered Medications   Medication     0.9% sodium chloride BOLUS    Followed by     sodium chloride 0.9% infusion     ondansetron (ZOFRAN) injection 4 mg     [START ON 7/1/2018] pantoprazole (PROTONIX) 40 mg IV push injection     pantoprazole (PROTONIX) 80 mg in sodium chloride 0.9 % 100 mL infusion     Current Outpatient Prescriptions   Medication     ACE/ARB NOT PRESCRIBED, INTENTIONAL,     albuterol (PROAIR HFA, PROVENTIL HFA, VENTOLIN HFA) 108 (90 BASE) MCG/ACT inhaler      "Alcohol Swabs (ALCOHOL PADS) 70 % PADS     ammonium lactate (LAC-HYDRIN) 12 % cream     baclofen (LIORESAL) 10 MG tablet     BASAGLAR 100 UNIT/ML injection     BD ROSE U/F 32G X 4 MM insulin pen needle     blood glucose monitoring (NO BRAND SPECIFIED) test strip     blood glucose monitoring (NO BRAND SPECIFIED) test strip     blood glucose monitoring (SOFTCLIX) lancets     Blood Glucose Monitoring Suppl (ACURA BLOOD GLUCOSE METER) W/DEVICE KIT     cephALEXin (KEFLEX) 500 MG capsule     clindamycin (CLEOCIN) 300 MG capsule     COMPRESSION STOCKINGS     hydrochlorothiazide (HYDRODIURIL) 25 MG tablet     mupirocin (BACTROBAN) 2 % ointment     omeprazole (PRILOSEC) 40 MG capsule     order for DME     order for DME     order for DME     ORDER FOR DME     oxyCODONE IR (ROXICODONE) 10 MG tablet     promethazine (PHENERGAN) 25 MG tablet     propranolol (INDERAL) 20 MG tablet     sertraline (ZOLOFT) 100 MG tablet     Syringe, Disposable, 25 ML MISC        Allergies   Allergen Reactions     Lisinopril Anaphylaxis     Swollen tongue; inability to swallow; drooling; hives; swollen face, neck, angioedema     Acetaminophen      Hx of cirrhosis      Amlodipine      Swelling, hives, possible angioedema       Keflex [Cephalexin]      Patient reports that he has taken augmentin and tolerated in in 2/2017     Morphine      b/p dropped and arms went numb  Hypotension     Quinolones Hives     Bactrim [Sulfamethoxazole W/Trimethoprim] Rash     Levaquin Swelling and Rash     Swelling in lip and tongue felt thick       Review of Systems   Gastrointestinal: Positive for nausea and vomiting (w/ blood). Negative for diarrhea.   All other systems reviewed and are negative.      Physical Exam   BP: 167/87  Pulse: 64  Heart Rate: 64  Temp: 97.5  F (36.4  C)  Resp: 18  Height: 180.3 cm (5' 11\")  Weight: 100.7 kg (222 lb)  SpO2: 97 %      Physical Exam   Constitutional: He is oriented to person, place, and time. He appears well-developed and " well-nourished.   abd binder in place;    HENT:   Head: Normocephalic and atraumatic.   Neck: Normal range of motion. Neck supple.   Cardiovascular: Normal rate, regular rhythm and normal heart sounds.    Pulmonary/Chest: Effort normal. No respiratory distress. He has no wheezes.   Abdominal: Soft. He exhibits no distension. There is no tenderness. There is no rebound.   Musculoskeletal: He exhibits edema (1+ edema).   Mid back dressing in place; GABRIELA drain with pink liquid; no surrounding back erythema, or edema   Neurological: He is alert and oriented to person, place, and time. No cranial nerve deficit. Coordination normal.   Skin: Skin is warm.   Psychiatric: He has a normal mood and affect. His behavior is normal. Thought content normal.       ED Course     ED Course     Procedures             Critical Care time:  none             Labs Ordered and Resulted from Time of ED Arrival Up to the Time of Departure from the ED - No data to display         Assessments & Plan (with Medical Decision Making)   The patient is a 57-year-old male who recently had surgery 2 days ago to remove a pain stimulator in his back by Pain Anesthesia who presents to the ER due to vomiting blood. The patient says that since the surgery he has been feeling nauseous and had a couple of episodes of vomiting on Thursday, Friday, as well as today. Today, the last episode of vomiting turned bloody. The patient came to the ER with a bag with about 200 mL of bright red blood. The patient has had no further vomiting since being in the ER for the past 3 hours. The patient is hemodynamically stable. The patient's hemoglobin is stable at 11. The patient was seen by the Anesthesia team. They have no acute interventions or recommendations due to their recent surgery. The patient was started on a Protonix drip and given a Protonix bolus. The patient will be admitted to the Internal Medicine service for further care.    This part of the document was  transcribed by Addi Barbosa for Brooklyn Hurtado MD.    I have reviewed the nursing notes.    I have reviewed the findings, diagnosis, plan and need for follow up with the patient.    New Prescriptions    No medications on file       Final diagnoses:   Hematemesis with nausea     ISaloni, am serving as a trained medical scribe to document services personally performed by Brooklyn Hurtado MD, based on the provider's statements to me.   IBrooklyn MD, was physically present and have reviewed and verified the accuracy of this note documented by Saloni Dimas.    6/30/2018   [facility], [place], EMERGENCY DEPARTMENT     Brooklyn Hurtado MD  06/30/18 [id number]

## 2018-06-30 NOTE — ED NOTES
[facility], [place]   ED Nurse to Floor Handoff     Erwin Aguilar is a 57 year old male who speaks English and lives with a spouse,  in a home  They arrived in the ED by ambulance from home    ED Chief Complaint: Vomiting (BIBA from home with c/o sudden onset of bloody emesis.  Pt reports that he is POD#2 of Neuro Stimulator exchange.)    ED Dx;   Final diagnoses:   Hematemesis with nausea         Needed?: No    Allergies:   Allergies   Allergen Reactions     Lisinopril Anaphylaxis     Swollen tongue; inability to swallow; drooling; hives; swollen face, neck, angioedema     Acetaminophen      Hx of cirrhosis      Amlodipine      Swelling, hives, possible angioedema       Keflex [Cephalexin]      Patient reports that he has taken augmentin and tolerated in in 2/2017     Morphine      b/p dropped and arms went numb  Hypotension     Quinolones Hives     Bactrim [Sulfamethoxazole W/Trimethoprim] Rash     Levaquin Swelling and Rash     Swelling in lip and tongue felt thick   .  Past Medical Hx:   Past Medical History:   Diagnosis Date     Actinic keratosis     aldara     Anxiety      Cancer (H)     squamous cell skin CA     Cauda equina spinal cord injury (H)      Chronic sinusitis 5-1-16     Diastasis recti      Esophageal reflux      Esophageal varices in cirrhosis (H) 4/1/2014    Hospitalized for UGI blee 3/28/14, endoscopy revealed bleeding varices.     Essential hypertension, benign      Intermittent asthma      Mild depression (H)      Mixed hyperlipidemia      Nasal polyps 5-1-16     Other chronic pain      SCCA (squamous cell carcinoma) of skin      Seasonal allergic conjunctivitis      Type II or unspecified type diabetes mellitus without mention of complication, not stated as uncontrolled      Unspecified site of spinal cord injury without evidence of spinal bone injury     due to back surgery      Baseline Mental status: WDL  Current Mental Status changes: at  basesline    Infection present or suspected this encounter: no  Sepsis suspected: No  Isolation type: No active isolations     Activity level - Baseline/Home:  Independent  Activity Level - Current:   Independent    Bariatric equipment needed?: No    In the ED these meds were given:   Medications   0.9% sodium chloride BOLUS (0 mLs Intravenous Stopped 6/30/18 1737)     Followed by   sodium chloride 0.9% infusion (1,000 mLs Intravenous New Bag 6/30/18 1737)   ondansetron (ZOFRAN) injection 4 mg (4 mg Intravenous Given 6/30/18 1710)   pantoprazole (PROTONIX) 80 mg in sodium chloride 0.9 % 100 mL infusion (8 mg/hr Intravenous New Bag 6/30/18 1703)   pantoprazole (PROTONIX) 40 mg IV push injection (40 mg Intravenous Given 6/30/18 1700)   metoclopramide (REGLAN) injection 10 mg (10 mg Intravenous Given 6/30/18 1828)       Drips running?  Yes Protonix gtt      Home pump  No    Current LDAs  Peripheral IV 06/30/18 Left (Active)   Site Assessment [facility] 6/30/2018  3:43 PM   Phlebitis Scale 0-->no symptoms 6/30/2018  3:43 PM   Infiltration Scale 0 6/30/2018  3:43 PM   Number of days:0       Peripheral IV 06/30/18 Left Lower forearm (Active)   Site Assessment [facility] 6/30/2018  4:21 PM   Phlebitis Scale 0-->no symptoms 6/30/2018  4:21 PM   Number of days:0       Closed/Suction Drain 1 Posterior Back Bulb 15 Panamanian (Active)   Number of days:2       Pressure Ulcer 09/28/14 Right Heel silver dollar size ulcure on R heel.  Red, warm, excoriated (Active)   Number of days:1371       Incision/Surgical Site 11/08/12 Mid;Anterior Abdomen (Active)   Number of days:2060       Incision/Surgical Site 06/28/18 Bilateral Back (Active)   Number of days:2       Incision/Surgical Site 06/28/18 Right Buttocks (Active)   Number of days:2       Labs results:   Labs Ordered and Resulted from Time of ED Arrival Up to the Time of Departure from the ED   CBC WITH PLATELETS DIFFERENTIAL - Abnormal; Notable for the following:        Result Value    WBC 12.6  "(*)     RBC Count 4.33 (*)     Hemoglobin 11.7 (*)     Hematocrit 35.0 (*)     RDW 15.8 (*)     Absolute Neutrophil 8.8 (*)     Absolute Monocytes 1.4 (*)     All other components within normal limits   COMPREHENSIVE METABOLIC PANEL - Abnormal; Notable for the following:     Potassium 3.3 (*)     Glucose 120 (*)     Creatinine 0.63 (*)     All other components within normal limits   OCCULT BLOOD GASTRIC - Abnormal; Notable for the following:     Gastric Occult Positive (*)     All other components within normal limits   INR   CARDIAC CONTINUOUS MONITORING   PERIPHERAL IV CATHETER   ABO/RH TYPE AND SCREEN       Imaging Studies: No results found for this or any previous visit (from the past 24 hour(s)).    Recent vital signs:   /66  Pulse 64  Temp 97.5  F (36.4  C) (Oral)  Resp 15  Ht 1.803 m (5' 11\")  Wt 100.7 kg (222 lb)  SpO2 92%  BMI 30.96 kg/m2    Cardiac Rhythm: Normal Sinus  Pt needs tele? No  Skin/wound Issues: Surgical Incision    Code Status: Full Code    Pain control: pt had none    Nausea control: good        Family present during ED course? No       Tasks needing completion: None    Darlyn Cano, RN    [phone] [facility]  [phone] [facility]    "

## 2018-06-30 NOTE — IP AVS SNAPSHOT
[address] [address]    Phone:  [phone]                                       After Visit Summary   6/30/2018    Erwin Aguilar    MRN: [id number]           After Visit Summary Signature Page     I have received my discharge instructions, and my questions have been answered. I have discussed any challenges I see with this plan with the nurse or doctor.    ..........................................................................................................................................  Patient/Patient Representative Signature      ..........................................................................................................................................  Patient Representative Print Name and Relationship to Patient    ..................................................               ................................................  Date                                            Time    ..........................................................................................................................................  Reviewed by Signature/Title    ...................................................              ..............................................  Date                                                            Time

## 2018-06-30 NOTE — ED TRIAGE NOTES
BIBA from home with c/o sudden onset of bloody emesis. Breathing regular and non-labored.  Monitor shows HR in the 50s and Sinus.  VSS. Pt A&Ox4.  Pt reports that he is POD#2 of Neuro Stimulator exchange.

## 2018-06-30 NOTE — IP AVS SNAPSHOT
MRN:[id number]                      After Visit Summary   6/30/2018    Erwin Aguilar    MRN: [id number]           Thank you!     Thank you for choosing [facility] for your care. Our goal is always to provide you with excellent care. Hearing back from our patients is one way we can continue to improve our services. Please take a few minutes to complete the written survey that you may receive in the mail after you visit with us. Thank you!        Patient Information     Date Of Birth          1960        Designated Caregiver       Most Recent Value    Caregiver    Will someone help with your care after discharge? yes    Name of designated caregiver omar Jolley    Phone number of caregiver [phone]    Caregiver address [address]      About your hospital stay     You were admitted on:  June 30, 2018 You last received care in the:  [address] [facility]    You were discharged on:  July 2, 2018        Reason for your hospital stay       You were hospitalized for vomiting of blood. This was likely due to irritation of your esophagus/stomach. You are tolerating food & without recurrence of bleeding. Please follow up with GI as previously scheduled.                  Who to Call     For medical emergencies, please call 911.  For non-urgent questions about your medical care, please call your primary care provider or clinic, [phone]          Attending Provider     Provider Specialty    Brooklyn Hutrado MD Emergency Medicine    [facility], Antonio Watkins MD Internal Medicine    Patrizia Duffy MD Internal Medicine       Primary Care Provider Office Phone # Fax #    Demario Daniel Irwin Wegener, [phone]      After Care Instructions     Activity       Your activity upon discharge: activity as tolerated            Diet       Follow this diet upon discharge: Regular Diet                  Follow-up Appointments     Follow Up and recommended labs and  "tests       As previously scheduled with GI on 7/11/18                  Your next 10 appointments already scheduled     Jul 06, 2018 10:00 AM CDT   (Arrive by 9:45 AM)   Return Visit with Elvis Sauceda MD   [facility] ([facility])    [address]  4th Floor  [place] [address]   [phone]            Jul 11, 2018  7:00 AM CDT   Lab with  LAB   [facility] ([facility])    [address]  1st Floor  [place] [address]   [phone]            Jul 11, 2018  8:00 AM CDT   (Arrive by 7:45 AM)   Return General Liver with Kimberly Ramirez MD   [facility] ([facility])    [address]  [address]  [place] [address]   [phone]            Jul 27, 2018  8:00 AM CDT   (Arrive by 7:45 AM)   Return Visit with VANCE Benz [facility] ([facility])    [address]  [address]th [facility] [address]   [phone]              Pending Results     Date and Time Order Name Status Description    6/28/2018 0835 Surgical pathology exam In process             Statement of Approval     Ordered          07/02/18 1239  I have reviewed and agree with all the recommendations and orders detailed in this document.  EFFECTIVE NOW     Approved and electronically signed by:  Patrizia Duffy MD             Admission Information     Date & Time Provider Department Dept. Phone    6/30/2018 Patrizia Duffy MD [address] [facility] [phone]      Your Vitals Were     Blood Pressure Pulse Temperature Respirations Height Weight    168/78 (BP Location: Left arm) 80 98.4  F (36.9  C) (Oral) 18 1.803 m (5' 11\") 103.4 kg (227 lb 14.4 oz)    Pulse Oximetry BMI (Body Mass Index)                94% 31.79 kg/m2          MyChart Information     MyChart " gives you secure access to your electronic health record. If you see a primary care provider, you can also send messages to your care team and make appointments. If you have questions, please call your primary care clinic.  If you do not have a primary care provider, please call [phone] and they will assist you.        Care EveryWhere ID     This is your Care EveryWhere ID. This could be used by other organizations to access your [place] medical records  [id number]        Equal Access to Services     LI GENAO : Hadii mary garcia hadasho Soomaali, waaxda luqadaha, qaybta kaalmada adeegyada, donis allison . So [facility] [phone].    ATENCIÓN: Si habla español, tiene a camp disposición servicios gratuitos de asistencia lingüística. [facility] [phone].    We comply with applicable federal civil rights laws and [place] laws. We do not discriminate on the basis of race, color, national origin, age, disability, sex, sexual orientation, or gender identity.               Review of your medicines      START taking        Dose / Directions    metoclopramide 10 MG tablet   Commonly known as:  REGLAN        Dose:  10 mg   Take 1 tablet (10 mg) by mouth every 6 hours as needed (nausea and vomiting)   Quantity:  30 tablet   Refills:  0       ondansetron 4 MG ODT tab   Commonly known as:  ZOFRAN-ODT        Dose:  4 mg   Take 1 tablet (4 mg) by mouth every 6 hours as needed for nausea or vomiting   Quantity:  30 tablet   Refills:  1         CONTINUE these medicines which may have CHANGED, or have new prescriptions. If we are uncertain of the size of tablets/capsules you have at home, strength may be listed as something that might have changed.        Dose / Directions    albuterol 108 (90 Base) MCG/ACT Inhaler   Commonly known as:  PROAIR HFA/PROVENTIL HFA/VENTOLIN HFA   This may have changed:    - when to take this  - additional instructions   Used for:  Moderate persistent asthma without  complication        Dose:  2 puff   Inhale 2 puffs into the lungs every 6 hours as needed for shortness of breath / dyspnea or wheezing Ventolin or other covered brand   Quantity:  3 Inhaler   Refills:  3       hydrochlorothiazide 25 MG tablet   Commonly known as:  HYDRODIURIL   This may have changed:  See the new instructions.   Used for:  Hypertension goal BP (blood pressure) < 140/90        TAKE 1 TABLET (25 MG) BY MOUTH DAILY   Quantity:  90 tablet   Refills:  3       mupirocin 2 % ointment   Commonly known as:  BACTROBAN   This may have changed:    - how to take this  - when to take this  - reasons to take this  - additional instructions   Used for:  Nasal lesion, Nasal vestibulitis        Apply to anterior nares BID X 2 month supply   Quantity:  2 g   Refills:  3       omeprazole 40 MG capsule   Commonly known as:  priLOSEC   This may have changed:    - when to take this  - reasons to take this  - additional instructions   Used for:  Gastroesophageal reflux disease without esophagitis        Dose:  40 mg   Take 1 capsule (40 mg) by mouth daily Take 30-60 minutes before a meal.   Quantity:  90 capsule   Refills:  3       sertraline 100 MG tablet   Commonly known as:  ZOLOFT   This may have changed:  when to take this   Used for:  Generalized anxiety disorder        Dose:  200 mg   Take 2 tablets (200 mg) by mouth daily   Quantity:  180 tablet   Refills:  1         CONTINUE these medicines which have NOT CHANGED        Dose / Directions    * ACE/ARB/ARNI NOT PRESCRIBED (INTENTIONAL)        ACE & ARB not prescribed due to Allergy   Refills:  0       ACURA BLOOD GLUCOSE METER w/Device Kit   Used for:  Type 2 diabetes, HbA1c goal < 7% (H)        Dose:  1 Dose   1 Dose 2 times daily   Quantity:  1 kit   Refills:  1       Alcohol Pads 70 % Pads   Used for:  Type 2 diabetes, HbA1c goal < 7% (H)        Dose:  1 Box   1 Box 4 times daily   Quantity:  100 each   Refills:  3       ammonium lactate 12 % cream   Commonly  known as:  LAC-HYDRIN   Used for:  Xerosis cutis        Apply topically 2 times daily as needed for dry skin   Quantity:  385 g   Refills:  3       baclofen 10 MG tablet   Commonly known as:  LIORESAL   Used for:  Muscle spasms of both lower extremities, Back muscle spasm        TAKE 2 TABLETS BY MOUTH THREE TIMES DAILY   Quantity:  180 tablet   Refills:  11       BASAGLAR 100 UNIT/ML injection   Used for:  Type 2 diabetes mellitus without complication, with long-term current use of insulin (H)        Dose:  26 Units   Inject 26 Units Subcutaneous daily   Quantity:  30 mL   Refills:  11       BD ROSE U/F 32G X 4 MM   Used for:  Type 2 diabetes mellitus without complication, with long-term current use of insulin (H)   Generic drug:  insulin pen needle        USE ONCE DAILY AS DIRECTED   Quantity:  100 each   Refills:  11       blood glucose monitoring lancets   Used for:  Type 2 diabetes, HbA1c goal < 7% (H)        Use to test blood sugar 1 time daily or as directed.   Quantity:  100 each   Refills:  3       * blood glucose monitoring test strip   Commonly known as:  no brand specified   Used for:  Type 2 diabetes, HbA1c goal < 7% (H)        Use to test blood sugars one time daily or as directed  Accucheck Richards plus test strips   Quantity:  1 Box   Refills:  5       * blood glucose monitoring test strip   Commonly known as:  no brand specified   Used for:  Type 2 diabetes mellitus with diabetic polyneuropathy, with long-term current use of insulin (H)        Use to test blood sugars 1 time daily. ACCU-CHEK KARISHMA BRAND PLEASE.   Quantity:  100 strip   Refills:  3       cephALEXin 500 MG capsule   Commonly known as:  KEFLEX   Used for:  Failure of spinal cord stimulator (H)        Dose:  500 mg   Take 1 capsule (500 mg) by mouth 4 times daily   Quantity:  56 capsule   Refills:  0       clindamycin 300 MG capsule   Commonly known as:  CLEOCIN   Used for:  Failure of spinal cord stimulator (H)        Dose:  300 mg  "  Take 1 capsule (300 mg) by mouth 4 times daily   Quantity:  56 capsule   Refills:  0       COMPRESSION STOCKINGS   Used for:  Lymphedema of both lower extremities        Knee high compression socks 30-40-mm   Quantity:  2 each   Refills:  1       * order for DME   Used for:  Hypertension goal BP (blood pressure) < 140/90        Equipment being ordered: BP cuff   Quantity:  1 Device   Refills:  0       * order for DME   Used for:  Pressure ulcer, stage II        Equipment being ordered: one donut for sitting   Quantity:  1 Device   Refills:  0       * order for DME   Used for:  Wound, open, buttock, right, initial encounter        ?Location:sacral ulcer unknown etiology possible sheer ?measurement: 2.5 cm x 2 cm x 0.1 cm Unlikely to be a  pressure ulcer but if it is it's a stg 2,  partial thickness, low eexudate  Wound supplies : 5 each Comfeel? Plus [id number] x 2 3/4''  5x7 cm  [facility]   wound care orders: cleans wound, dry thoroughly  and replace Comfeel. Leave in place for 7 days for 5 weeks   Quantity:  5 each   Refills:  3       * order for DME   Used for:  Benign neoplasm of skin of trunk, except scrotum        Equipment being ordered: wound care supplies CONVATEC DUODERM  Extra Thin Dressing - 1 1/4\" x 1 1/2\", Spots, oval [id number] Wound measurements 3 cm x 3 cm x 0.1 cm lesions with wound area of 1.5 x 1.5 that has slight drainage, full thickness located on the right sacral region   Quantity:  1 Box   Refills:  3       oxyCODONE IR 10 MG tablet   Commonly known as:  ROXICODONE   Used for:  Post laminectomy syndrome        Dose:  10 mg   Take 1 tablet (10 mg) by mouth every 6 hours as needed for moderate to severe pain   Quantity:  40 tablet   Refills:  0       promethazine 25 MG tablet   Commonly known as:  PHENERGAN   Used for:  Nausea without vomiting        TAKE 1 TABLET (25 MG) BY MOUTH EVERY 6 HOURS AS NEEDED FOR NAUSEA   Quantity:  40 tablet   Refills:  11       propranolol 20 MG tablet "   Commonly known as:  INDERAL   Used for:  Esophageal varices in cirrhosis (H)        TAKE 1 TABLET (20 MG) BY MOUTH 3 TIMES DAILY   Quantity:  90 tablet   Refills:  0       Syringe (Disposable) 25 ML Misc   Used for:  Chronic maxillary sinusitis        Syringe to be used for nasal irrigation   Quantity:  1 each   Refills:  3       * Notice:  This list has 7 medication(s) that are the same as other medications prescribed for you. Read the directions carefully, and ask your doctor or other care provider to review them with you.         Where to get your medicines      Some of these will need a paper prescription and others can be bought over the counter. Ask your nurse if you have questions.     Bring a paper prescription for each of these medications     metoclopramide 10 MG tablet    ondansetron 4 MG ODT tab                Protect others around you: Learn how to safely use, store and throw away your medicines at www.disposemymeds.org.             Medication List: This is a list of all your medications and when to take them. Check marks below indicate your daily home schedule. Keep this list as a reference.      Medications           Morning Afternoon Evening Bedtime As Needed    * ACE/ARB/ARNI NOT PRESCRIBED (INTENTIONAL)   ACE & ARB not prescribed due to Allergy                                ACURA BLOOD GLUCOSE METER w/Device Kit   1 Dose 2 times daily                                albuterol 108 (90 Base) MCG/ACT Inhaler   Commonly known as:  PROAIR HFA/PROVENTIL HFA/VENTOLIN HFA   Inhale 2 puffs into the lungs every 6 hours as needed for shortness of breath / dyspnea or wheezing Ventolin or other covered brand                                Alcohol Pads 70 % Pads   1 Box 4 times daily                                ammonium lactate 12 % cream   Commonly known as:  LAC-HYDRIN   Apply topically 2 times daily as needed for dry skin                                baclofen 10 MG tablet   Commonly known as:  LIORESAL    TAKE 2 TABLETS BY MOUTH THREE TIMES DAILY   Last time this was given:  10 mg on 7/2/2018  6:54 AM                                BASAGLAR 100 UNIT/ML injection   Inject 26 Units Subcutaneous daily                                BD ROSE U/F 32G X 4 MM   USE ONCE DAILY AS DIRECTED   Generic drug:  insulin pen needle                                blood glucose monitoring lancets   Use to test blood sugar 1 time daily or as directed.                                * blood glucose monitoring test strip   Commonly known as:  no brand specified   Use to test blood sugars one time daily or as directed  Accucheck Richards plus test strips                                * blood glucose monitoring test strip   Commonly known as:  no brand specified   Use to test blood sugars 1 time daily. ACCU-CHEK KARISHMA BRAND PLEASE.                                cephALEXin 500 MG capsule   Commonly known as:  KEFLEX   Take 1 capsule (500 mg) by mouth 4 times daily                                clindamycin 300 MG capsule   Commonly known as:  CLEOCIN   Take 1 capsule (300 mg) by mouth 4 times daily                                COMPRESSION STOCKINGS   Knee high compression socks 30-40-mm                                hydrochlorothiazide 25 MG tablet   Commonly known as:  HYDRODIURIL   TAKE 1 TABLET (25 MG) BY MOUTH DAILY   Last time this was given:  25 mg on 7/2/2018  8:17 AM                                metoclopramide 10 MG tablet   Commonly known as:  REGLAN   Take 1 tablet (10 mg) by mouth every 6 hours as needed (nausea and vomiting)   Last time this was given:  10 mg on 7/2/2018  9:44 AM                                mupirocin 2 % ointment   Commonly known as:  BACTROBAN   Apply to anterior nares BID X 2 month supply                                omeprazole 40 MG capsule   Commonly known as:  priLOSEC   Take 1 capsule (40 mg) by mouth daily Take 30-60 minutes before a meal.   Last time this was given:  40 mg on 7/2/2018  8:16 AM  "                               ondansetron 4 MG ODT tab   Commonly known as:  ZOFRAN-ODT   Take 1 tablet (4 mg) by mouth every 6 hours as needed for nausea or vomiting   Last time this was given:  4 mg on 7/2/2018  8:15 AM                                * order for DME   Equipment being ordered: BP cuff                                * order for DME   Equipment being ordered: one donut for sitting                                * order for DME   ?Location:sacral ulcer unknown etiology possible sheer ?measurement: 2.5 cm x 2 cm x 0.1 cm Unlikely to be a  pressure ulcer but if it is it's a stg 2,  partial thickness, low eexudate  Wound supplies : 5 each Comfeel? Plus [id number] x 2 3/4''  5x7 cm  [facility]   wound care orders: cleans wound, dry thoroughly  and replace Comfeel. Leave in place for 7 days for 5 weeks                                * order for DME   Equipment being ordered: wound care supplies CONVATEC DUODERM  Extra Thin Dressing - 1 1/4\" x 1 1/2\", Spots, oval [id number] Wound measurements 3 cm x 3 cm x 0.1 cm lesions with wound area of 1.5 x 1.5 that has slight drainage, full thickness located on the right sacral region                                oxyCODONE IR 10 MG tablet   Commonly known as:  ROXICODONE   Take 1 tablet (10 mg) by mouth every 6 hours as needed for moderate to severe pain                                promethazine 25 MG tablet   Commonly known as:  PHENERGAN   TAKE 1 TABLET (25 MG) BY MOUTH EVERY 6 HOURS AS NEEDED FOR NAUSEA                                propranolol 20 MG tablet   Commonly known as:  INDERAL   TAKE 1 TABLET (20 MG) BY MOUTH 3 TIMES DAILY   Last time this was given:  20 mg on 7/2/2018  8:17 AM                                sertraline 100 MG tablet   Commonly known as:  ZOLOFT   Take 2 tablets (200 mg) by mouth daily   Last time this was given:  200 mg on 7/2/2018  8:16 AM                                Syringe (Disposable) 25 ML Misc   Syringe to be used for " nasal irrigation                                * Notice:  This list has 7 medication(s) that are the same as other medications prescribed for you. Read the directions carefully, and ask your doctor or other care provider to review them with you.

## 2018-06-30 NOTE — ED NOTES
Bed: ED18  Expected date: 6/30/18  Expected time:   Means of arrival: Ambulance  Comments:  STP 14  55 y M  Nausea and Vomiting, New onset a-fib, BP 94/56  Yellow

## 2018-07-01 LAB
ALBUMIN SERPL-MCNC: 3.4 G/DL (ref 3.4–5)
ALP SERPL-CCNC: 89 U/L (ref 40–150)
ALT SERPL W P-5'-P-CCNC: 20 U/L (ref 0–70)
ANION GAP SERPL CALCULATED.3IONS-SCNC: 8 MMOL/L (ref 3–14)
AST SERPL W P-5'-P-CCNC: 18 U/L (ref 0–45)
BILIRUB SERPL-MCNC: 0.9 MG/DL (ref 0.2–1.3)
BUN SERPL-MCNC: 8 MG/DL (ref 7–30)
CALCIUM SERPL-MCNC: 8.4 MG/DL (ref 8.5–10.1)
CHLORIDE SERPL-SCNC: 112 MMOL/L (ref 94–109)
CO2 SERPL-SCNC: 23 MMOL/L (ref 20–32)
CREAT SERPL-MCNC: 0.59 MG/DL (ref 0.66–1.25)
ERYTHROCYTE [DISTWIDTH] IN BLOOD BY AUTOMATED COUNT: 15.5 % (ref 10–15)
GFR SERPL CREATININE-BSD FRML MDRD: >90 ML/MIN/1.7M2
GLUCOSE BLDC GLUCOMTR-MCNC: 103 MG/DL (ref 70–99)
GLUCOSE BLDC GLUCOMTR-MCNC: 113 MG/DL (ref 70–99)
GLUCOSE BLDC GLUCOMTR-MCNC: 128 MG/DL (ref 70–99)
GLUCOSE BLDC GLUCOMTR-MCNC: 145 MG/DL (ref 70–99)
GLUCOSE BLDC GLUCOMTR-MCNC: 152 MG/DL (ref 70–99)
GLUCOSE SERPL-MCNC: 117 MG/DL (ref 70–99)
HCT VFR BLD AUTO: 35.5 % (ref 40–53)
HGB BLD-MCNC: 10.8 G/DL (ref 13.3–17.7)
HGB BLD-MCNC: 11.3 G/DL (ref 13.3–17.7)
INR PPP: 1.12 (ref 0.86–1.14)
MCH RBC QN AUTO: 26.8 PG (ref 26.5–33)
MCHC RBC AUTO-ENTMCNC: 31.8 G/DL (ref 31.5–36.5)
MCV RBC AUTO: 84 FL (ref 78–100)
PLATELET # BLD AUTO: 152 10E9/L (ref 150–450)
POTASSIUM SERPL-SCNC: 3.5 MMOL/L (ref 3.4–5.3)
PROT SERPL-MCNC: 7.1 G/DL (ref 6.8–8.8)
RBC # BLD AUTO: 4.22 10E12/L (ref 4.4–5.9)
SODIUM SERPL-SCNC: 142 MMOL/L (ref 133–144)
WBC # BLD AUTO: 7.4 10E9/L (ref 4–11)

## 2018-07-01 PROCEDURE — A9270 NON-COVERED ITEM OR SERVICE: HCPCS | Mod: GY | Performed by: STUDENT IN AN ORGANIZED HEALTH CARE EDUCATION/TRAINING PROGRAM

## 2018-07-01 PROCEDURE — 40000556 ZZH STATISTIC PERIPHERAL IV START W US GUIDANCE

## 2018-07-01 PROCEDURE — 25000128 H RX IP 250 OP 636: Performed by: EMERGENCY MEDICINE

## 2018-07-01 PROCEDURE — 25000132 ZZH RX MED GY IP 250 OP 250 PS 637: Mod: GY | Performed by: STUDENT IN AN ORGANIZED HEALTH CARE EDUCATION/TRAINING PROGRAM

## 2018-07-01 PROCEDURE — 25000125 ZZHC RX 250: Performed by: EMERGENCY MEDICINE

## 2018-07-01 PROCEDURE — A9270 NON-COVERED ITEM OR SERVICE: HCPCS | Mod: GY | Performed by: PEDIATRICS

## 2018-07-01 PROCEDURE — 85018 HEMOGLOBIN: CPT | Performed by: PEDIATRICS

## 2018-07-01 PROCEDURE — 99233 SBSQ HOSP IP/OBS HIGH 50: CPT | Mod: GC | Performed by: INTERNAL MEDICINE

## 2018-07-01 PROCEDURE — 85027 COMPLETE CBC AUTOMATED: CPT | Performed by: PEDIATRICS

## 2018-07-01 PROCEDURE — 36415 COLL VENOUS BLD VENIPUNCTURE: CPT | Performed by: PEDIATRICS

## 2018-07-01 PROCEDURE — 12000001 ZZH R&B MED SURG/OB UMMC

## 2018-07-01 PROCEDURE — 85610 PROTHROMBIN TIME: CPT | Performed by: PEDIATRICS

## 2018-07-01 PROCEDURE — 25000132 ZZH RX MED GY IP 250 OP 250 PS 637: Mod: GY | Performed by: PEDIATRICS

## 2018-07-01 PROCEDURE — 80053 COMPREHEN METABOLIC PANEL: CPT | Performed by: PEDIATRICS

## 2018-07-01 PROCEDURE — 00000146 ZZHCL STATISTIC GLUCOSE BY METER IP

## 2018-07-01 PROCEDURE — 25000128 H RX IP 250 OP 636: Performed by: PEDIATRICS

## 2018-07-01 RX ORDER — HYDROCHLOROTHIAZIDE 25 MG/1
25 TABLET ORAL EVERY MORNING
Status: DISCONTINUED | OUTPATIENT
Start: 2018-07-01 | End: 2018-07-02 | Stop reason: HOSPADM

## 2018-07-01 RX ORDER — PROPRANOLOL HYDROCHLORIDE 20 MG/1
20 TABLET ORAL 3 TIMES DAILY
Status: DISCONTINUED | OUTPATIENT
Start: 2018-07-01 | End: 2018-07-02 | Stop reason: HOSPADM

## 2018-07-01 RX ADMIN — PROCHLORPERAZINE EDISYLATE 10 MG: 5 INJECTION INTRAMUSCULAR; INTRAVENOUS at 02:55

## 2018-07-01 RX ADMIN — METOCLOPRAMIDE 10 MG: 5 INJECTION, SOLUTION INTRAMUSCULAR; INTRAVENOUS at 13:18

## 2018-07-01 RX ADMIN — POTASSIUM CHLORIDE 20 MEQ: 750 TABLET, EXTENDED RELEASE ORAL at 03:39

## 2018-07-01 RX ADMIN — PROCHLORPERAZINE EDISYLATE 10 MG: 5 INJECTION INTRAMUSCULAR; INTRAVENOUS at 15:16

## 2018-07-01 RX ADMIN — PROPRANOLOL HYDROCHLORIDE 20 MG: 20 TABLET ORAL at 23:46

## 2018-07-01 RX ADMIN — BACLOFEN 10 MG: 10 TABLET ORAL at 20:40

## 2018-07-01 RX ADMIN — BACLOFEN 10 MG: 10 TABLET ORAL at 13:18

## 2018-07-01 RX ADMIN — CEFTRIAXONE 1 G: 1 INJECTION, POWDER, FOR SOLUTION INTRAMUSCULAR; INTRAVENOUS at 20:41

## 2018-07-01 RX ADMIN — METOCLOPRAMIDE 10 MG: 5 INJECTION, SOLUTION INTRAMUSCULAR; INTRAVENOUS at 00:16

## 2018-07-01 RX ADMIN — HYDROCHLOROTHIAZIDE 25 MG: 25 TABLET ORAL at 11:47

## 2018-07-01 RX ADMIN — SODIUM CHLORIDE 1000 ML: 9 INJECTION, SOLUTION INTRAVENOUS at 08:05

## 2018-07-01 RX ADMIN — SODIUM CHLORIDE 8 MG/HR: 9 INJECTION, SOLUTION INTRAVENOUS at 08:02

## 2018-07-01 RX ADMIN — ONDANSETRON 4 MG: 2 INJECTION INTRAMUSCULAR; INTRAVENOUS at 05:45

## 2018-07-01 RX ADMIN — METOCLOPRAMIDE 10 MG: 5 INJECTION, SOLUTION INTRAMUSCULAR; INTRAVENOUS at 20:41

## 2018-07-01 RX ADMIN — PROCHLORPERAZINE EDISYLATE 10 MG: 5 INJECTION INTRAMUSCULAR; INTRAVENOUS at 09:12

## 2018-07-01 RX ADMIN — METOCLOPRAMIDE 10 MG: 5 INJECTION, SOLUTION INTRAMUSCULAR; INTRAVENOUS at 07:05

## 2018-07-01 RX ADMIN — SODIUM CHLORIDE 1000 ML: 9 INJECTION, SOLUTION INTRAVENOUS at 16:16

## 2018-07-01 RX ADMIN — POTASSIUM CHLORIDE 20 MEQ: 750 TABLET, EXTENDED RELEASE ORAL at 07:05

## 2018-07-01 RX ADMIN — ONDANSETRON 4 MG: 2 INJECTION INTRAMUSCULAR; INTRAVENOUS at 18:18

## 2018-07-01 RX ADMIN — PROCHLORPERAZINE EDISYLATE 10 MG: 5 INJECTION INTRAMUSCULAR; INTRAVENOUS at 22:33

## 2018-07-01 RX ADMIN — ONDANSETRON 4 MG: 2 INJECTION INTRAMUSCULAR; INTRAVENOUS at 11:57

## 2018-07-01 ASSESSMENT — ACTIVITIES OF DAILY LIVING (ADL)
TOILETING: 0-->INDEPENDENT
COGNITION: 0 - NO COGNITION ISSUES REPORTED
BATHING: 0-->INDEPENDENT
AMBULATION: 0-->INDEPENDENT
SWALLOWING: 0-->SWALLOWS FOODS/LIQUIDS WITHOUT DIFFICULTY
DRESS: 0 - INDEPENDENT
TRANSFERRING: 0 - INDEPENDENT
TRANSFERRING: 0-->INDEPENDENT
EATING: 0 - INDEPENDENT
RETIRED_COMMUNICATION: 0-->UNDERSTANDS/COMMUNICATES WITHOUT DIFFICULTY
DRESS: 0-->INDEPENDENT
COMMUNICATION: 0 - UNDERSTANDS/COMMUNICATES WITHOUT DIFFICULTY
FALL_HISTORY_WITHIN_LAST_SIX_MONTHS: NO
RETIRED_EATING: 0-->INDEPENDENT
BATHING: 0 - INDEPENDENT
AMBULATION: 0 - INDEPENDENT
TOILETING: 0 - INDEPENDENT
SWALLOWING: 0 - SWALLOWS FOODS/LIQUIDS WITHOUT DIFFICULTY

## 2018-07-01 NOTE — PLAN OF CARE
"Problem: Patient Care Overview  Goal: Plan of Care/Patient Progress Review  Outcome: No Change  Provided care for patient from 1900-0730.  /56 (BP Location: Right arm)  Pulse 65  Temp 98.7  F (37.1  C) (Oral)  Resp 16  Ht 1.803 m (5' 11\")  Wt 103.4 kg (227 lb 14.4 oz)  SpO2 95%  BMI 31.79 kg/m2  Vitally stable on RA.  Patient  alert and oriented to person, place, and time.  Patient is a 57 year old male who who has experienced nausea and vomiting for a week with one episode of hematesis which brought him to the hospital.    Patient had surgery to remove spinal cord stimulator  And replacement with new one 2 days ago. Anticipate new stimulator to be activated next week  In f/u in clinic.  Patient endorses quitting drinking alcohol 4 years ago. He describes his challenges of his health spiraling out of control as it declined and took his health, job, livelihood, along with  That journey and the physical pain, medical issues, and financial burden that he has been living with since that time.  PMH includes:   alcoholic cirrhosis, DM II, JATIN, MDD,  large > 5mm esophageal varice s/p banding in 7/2014,    Pt continued to have nausea during the entire shift with minimal relief with PRN Reglan, Compazine, and Zofran spaced out. He reports the most relief from Compazine and sleep. Pt had continuous infusion of Protonix infusion at  8ml/hr as well as Continuous infusion of Octreotide at 50 mcg/hr .  NS at 125/hr. Pt endorses significant frustration with lack of sleep due to the noise from roommate's bipap and activity all night. He would like room change. Ear plugs didn't help. Pt has abdominal binder in place with  GABRIELA drain attached with scant pink output).  He did decide to try to take PO Potassium in the pill form stating he felt that taking it in pill form would be less amount of liquid. He took total of 40 mEQ spaced out over 4 hours as he could tolerate with nausea after medications for  Nausea. Labs came " back and initial potassium was 3.3 and then was 3.5. Pt scheduled for EDG this date, but no time. Hourly rounding complete, call light within reach.  Plan:  Notify MD of changes.

## 2018-07-01 NOTE — CONSULTS
GASTROENTEROLOGY CONSULTATION      Date of Admission:  6/30/2018          ASSESSMENT AND RECOMMENDATIONS:   Assessment:  57 year old male with a history of compensated alcoholic cirrhosis c/b EV (banded 2014), GERD, HTN, DMII, who presented with nausea, vomiting and hematemesis. Patient sober for 4.5 years and normal synthetic function of the liver with no further signs of portal hypertension. Last US in 2018 with no HCC. We suspect his liver disease might improved significantly from his sobriety. On admission hemoglobin drop of 1g with no melena and no other signs of bleeding. Unlikely this is a variceal bleed. Given nausea and vomiting potentially due to the antibiotics (clyndamicin and cephalexin) for his spinal cord stimulator reimplantation this could certainly represent a Colleen wan tear. No signs of infection or encephalopathy. At this point we will hold on EGD.     Recommendations  --Continue ceftriaxone daily   --Continue daily MELD-Na labs  --Continue PPI BID PO  --Ok to ADAT  --Discontinue octreotide drip  --Please obtain Abdominal US with dopplers  --We will continue to follow    Gastroenterology follow up recommendations: TBD    Thank you for involving us in this patient's care. Please do not hesitate to contact the GI service with any questions or concerns.     Pt care plan discussed with Dr. Britton, GI staff physician.    Cr Medley  GI Fellow    Physician Attestation  I, Alice Britton, saw and examined this patient, and in agreement with Dr. Medley and their findings as well as plan of care as documented in the above note.    I personally reviewed vital signs, medications, labs and imaging.    Alice Britton MD  Hepatology staff  Patient seen on: 7/1/2018    -------------------------------------------------------------------------------------------------------------------          Chief Complaint:   We were asked by Dr. Duffy of [facility] to evaluate this patient with  Hematemesis    History is obtained from the patient and the medical record.          History of Present Illness:   Erwin Aguilar is a 57 year old male with a history of compensated alcoholic cirrhosis c/b EV (banded 2014), GERD, HTN, DMII, who presented with nausea, vomiting and hematemesis. On admission hemoglobin drop of 1g with no melena and no other signs of bleeding. He reports he started vomiting and having nausea for a week given the antibiotics they started him for his surgery on 6/28. Patient denies any fevers, chills or other signs of infection. Most recent EGD with no EV or gastric varices in 2016. He reports sobriety for the last 4.5 years.            Past Medical History:   Reviewed and edited as appropriate  Past Medical History:   Diagnosis Date     Actinic keratosis     aldara     Anxiety      Cancer (H)     squamous cell skin CA     Cauda equina spinal cord injury (H)      Chronic sinusitis 5-1-16     Diastasis recti      Esophageal reflux      Esophageal varices in cirrhosis (H) 4/1/2014    Hospitalized for UGI blee 3/28/14, endoscopy revealed bleeding varices.     Essential hypertension, benign      Intermittent asthma      Mild depression (H)      Mixed hyperlipidemia      Nasal polyps 5-1-16     Other chronic pain      SCCA (squamous cell carcinoma) of skin      Seasonal allergic conjunctivitis      Type II or unspecified type diabetes mellitus without mention of complication, not stated as uncontrolled      Unspecified site of spinal cord injury without evidence of spinal bone injury     due to back surgery            Past Surgical History:   Reviewed and edited as appropriate   Past Surgical History:   Procedure Laterality Date     BACK SURGERY  August 2009     C APPENDECTOMY  1974     COLONOSCOPY N/A 5/12/2016    Procedure: COMBINED COLONOSCOPY, SINGLE OR MULTIPLE BIOPSY/POLYPECTOMY BY BIOPSY;  Surgeon: Ana Paula Urbina MD;  Location:  GI     ENDOSCOPY UPPER,  COLONOSCOPY, COMBINED  10/19/2011    Procedure:COMBINED ENDOSCOPY UPPER, COLONOSCOPY; Upper Endoscopy, Colonoscopy with Polypectomy x4; Surgeon:AMBAR RODRÍGUEZ; Location: OR     ENT SURGERY  1-2016    Ongoing since then     ESOPHAGOSCOPY, GASTROSCOPY, DUODENOSCOPY (EGD), COMBINED  3/28/2014    Procedure: COMBINED ESOPHAGOSCOPY, GASTROSCOPY, DUODENOSCOPY (EGD);  EGD, Gastric Biopsies, Esophageal Banding;  Surgeon: Reyna Tovar MD;  Location: [facility] OR     ESOPHAGOSCOPY, GASTROSCOPY, DUODENOSCOPY (EGD), COMBINED  6/9/2014    Procedure: COMBINED ESOPHAGOSCOPY, GASTROSCOPY, DUODENOSCOPY (EGD);  Surgeon: Curtis Mendez MD;  Location:  GI     ESOPHAGOSCOPY, GASTROSCOPY, DUODENOSCOPY (EGD), COMBINED  7/24/2014    Procedure: COMBINED ESOPHAGOSCOPY, GASTROSCOPY, DUODENOSCOPY (EGD);  Surgeon: Gerard Samaniego MD;  Location:  OR     ESOPHAGOSCOPY, GASTROSCOPY, DUODENOSCOPY (EGD), COMBINED N/A 10/31/2014    Procedure: COMBINED ESOPHAGOSCOPY, GASTROSCOPY, DUODENOSCOPY (EGD);  Surgeon: Gerard Samaniego MD;  Location:  OR     ESOPHAGOSCOPY, GASTROSCOPY, DUODENOSCOPY (EGD), COMBINED N/A 5/12/2016    Procedure: COMBINED ESOPHAGOSCOPY, GASTROSCOPY, DUODENOSCOPY (EGD);  Surgeon: Ana Paula Urbina MD;  Location:  GI     HCL SQUAMOUS CELL CARCINOMA AG  10/07    left forearm     HERNIORRHAPHY UMBILICAL  11/8/2012    Procedure: HERNIORRHAPHY UMBILICAL;  Open Umbilical Hernia Repair With Mesh ;  Surgeon: Chase Nicholson MD;  Location: [facility] OR     INSERT STIMULATOR DORSAL COLUMN N/A 6/28/2018    Procedure: INSERT STIMULATOR DORSAL COLUMN;;  Surgeon: Elvis Sauceda MD;  Location:  OR     neuro stimulator  2010     REMOVE GENERATOR STIMULATOR (LOCATION) N/A 6/28/2018    Procedure: REMOVE GENERATOR STIMULATOR (LOCATION);  Spinal Cord Stimulator and IPG Explant and Re-Implant of SCS System (Leads and IPG);  Surgeon: Elvis Sauceda MD;  Location:  OR      SURGICAL HISTORY OF -   1/02    abcess tooth     SURGICAL HISTORY OF -   1999    L4-5 laminectomy, cauda equina syndrome     SURGICAL HISTORY OF -   +    herniated disk repair     TONSILLECTOMY  10 1964     TRANSPOSITION ULNAR NERVE (ELBOW)      right            Previous Endoscopy:     Results for orders placed or performed during the hospital encounter of 05/12/16   COLONOSCOPY   Result Value Ref Range    COLONOSCOPY       [facility], [address] [place] [address] [phone]     Endoscopy Department  _______________________________________________________________________________  Patient Name: Erwin Aguilar            Procedure Date: 5/12/2016 7:23 AM  MRN: [id number]                       Account Number: [id number]  YOB: 1960             Admit Type: Outpatient  Age: 55                                Gender: Male  Note Status: Finalized                Attending MD: Ana Paula Urbina MD  _______________________________________________________________________________     Procedure:           Colonoscopy  Indications:         High risk colon cancer surveillance: Personal history of                        non-advanced adenoma  Providers:           Ana Paula Urbina MD, Kurtis Aparicio RN  Patient Profile:     Mr. Aguilar is a 55 year old male with alcoholic                        cirrhosis, prior esophageal variceal bleed s/p banding                        to  eradication,Â GERD, MDD, JATIN, HTN, HL, DMT2, and cauda                        equina who presents for surveillance colonoscopy.                        Tubular adenomas were found on his last exam 5 years ago.  Referring MD:        Kimberly Ramirez MD  Medicines:           Monitored Anesthesia Care  Complications:       No immediate complications.  _______________________________________________________________________________  Procedure:           Pre-Anesthesia Assessment:                       - See the other  procedure note for documentation of the                        pre-procedure assessment.                       After obtaining informed consent, the colonoscope was                        passed under direct vision. Throughout the procedure,                        the patient's blood pressure, pulse, and oxygen                        saturations were monitored continuously. The Colonoscope                        was introduced through the anus and advanced to the                         cecum, identified by appendiceal orifice and ileocecal                        valve. The colonoscopy was performed without difficulty.                        The patient tolerated the procedure well. The quality of                        the bowel preparation was adequate to identify polyps 6                        mm and larger in size.                                                                                   Findings:       The perianal and digital rectal examinations were normal.       A moderate amount of semi-liquid stool was found in the entire colon,        interfering with visualization. Lavage of the area was performed using        copious amounts, resulting in clearance with adequate visualization.       A sessile polyp was found in the sigmoid colon. The polyp was 4 mm in        size. The polyp was removed with a jumbo cold forceps. Resection and        retrieval were complete.       The retroflexed view of the distal rectum and anal verge was normal and         showed no anal or rectal abnormalities.                                                                                   Impression:          - Stool in the entire examined colon.                       - One 4 mm polyp in the sigmoid colon. Resected and                        retrieved.                       - The distal rectum and anal verge are normal on                        retroflexion view.  Recommendation:      - Await pathology results.                        - Return to previous diet.                       - Discharge patient to home.                       - Repeat colonoscopy in 3 years due to quality of prep.                        Recommend additional Go-lytely prep prior to next                        colonoscopy (2 day prep).                       - Return to referring physician.                                                                                     Ana Paula Urbina MD  _______________________  Ana Paula Urbina MD  5/12/2016 9:37 AM  Number of A ddenda: 0    Note Initiated On: 5/12/2016 7:23 AM              Social History:   Reviewed and edited as appropriate  Social History     Social History     Marital status: Single     Spouse name: N/A     Number of children: 0     Years of education: N/A     Occupational History     sales Unemployed     Social History Main Topics     Smoking status: Former Smoker     Packs/day: 0.50     Years: 1.00     Types: Cigarettes     Start date: 10/13/1983     Quit date: 6/9/1984     Smokeless tobacco: Never Used     Alcohol use No      Comment: a pint of alcohol / day - last drink was 3/28/14     Drug use: 2.00 per week     Special: Marijuana      Comment: marijuana - occasional     Sexual activity: Not Currently     Partners: Female, Male     Other Topics Concern     Parent/Sibling W/ Cabg, Mi Or Angioplasty Before 65f 55m? No     Social History Narrative            Family History:   Reviewed and edited as appropriate  No known history of gastrointestinal/liver disease or  gastrointestinal malignancies       Allergies:   Reviewed and edited as appropriate     Allergies   Allergen Reactions     Lisinopril Anaphylaxis     Swollen tongue; inability to swallow; drooling; hives; swollen face, neck, angioedema     Acetaminophen      Hx of cirrhosis      Amlodipine      Swelling, hives, possible angioedema       Keflex [Cephalexin]      Patient reports that he has taken augmentin and tolerated in in 2/2017  (  "Pt was on Keflex in June 2018- no allergic symptom experienced)      Morphine      b/p dropped and arms went numb  Hypotension     Quinolones Hives     Bactrim [Sulfamethoxazole W/Trimethoprim] Rash     Levaquin Swelling and Rash     Swelling in lip and tongue felt thick            Medications:     Current Facility-Administered Medications   Medication     acetaminophen (TYLENOL) tablet 325 mg     baclofen (LIORESAL) tablet 10 mg     cefTRIAXone (ROCEPHIN) 1 g vial to attach to  mL bag for ADULTS or NS 50 mL bag for PEDS     glucose gel 15-30 g    Or     dextrose 50 % injection 25-50 mL    Or     glucagon injection 1 mg     hydrochlorothiazide (HYDRODIURIL) tablet 25 mg     melatonin tablet 1 mg     metoclopramide (REGLAN) tablet 10 mg    Or     metoclopramide (REGLAN) injection 10 mg     naloxone (NARCAN) injection 0.1-0.4 mg     [START ON 7/2/2018] omeprazole (priLOSEC) CR capsule 40 mg     ondansetron (ZOFRAN) injection 4 mg     ondansetron (ZOFRAN-ODT) ODT tab 4 mg    Or     ondansetron (ZOFRAN) injection 4 mg     potassium chloride (KLOR-CON) Packet 20-40 mEq     potassium chloride 10 mEq in 100 mL intermittent infusion with 10 mg lidocaine     potassium chloride 10 mEq in 100 mL sterile water intermittent infusion (premix)     potassium chloride 20 mEq in 50 mL intermittent infusion     potassium chloride SA (K-DUR/KLOR-CON M) CR tablet 20-40 mEq     prochlorperazine (COMPAZINE) injection 10 mg    Or     prochlorperazine (COMPAZINE) tablet 10 mg    Or     prochlorperazine (COMPAZINE) Suppository 25 mg     sertraline (ZOLOFT) tablet 200 mg     sodium chloride 0.9% infusion             Review of Systems:   A complete review of systems was performed and is negative except as noted in the HPI           Physical Exam:   /86 (BP Location: Left arm)  Pulse 113  Temp 98.4  F (36.9  C) (Oral)  Resp 16  Ht 1.803 m (5' 11\")  Wt 103.4 kg (227 lb 14.4 oz)  SpO2 100%  BMI 31.79 kg/m2  Wt:   Wt Readings " from Last 2 Encounters:   06/30/18 103.4 kg (227 lb 14.4 oz)   06/28/18 100.7 kg (222 lb)      Constitutional: cooperative, pleasant, not dyspneic/diaphoretic, no acute distress  Eyes: Sclera anicteric/injected  Ears/nose/mouth/throat: Normal oropharynx without ulcers or exudate, mucus membranes moist, hearing intact  Neck: supple, thyroid normal size  CV: No edema  Respiratory: Unlabored breathing  Lymph: No axillary, submandibular, supraclavicular or inguinal lymphadenopathy  Abd:  Nondistended, +bs, no hepatosplenomegaly, nontender, no peritoneal signs  Skin: warm, perfused, no jaundice  Neuro: AAO x 3, No asterixis  Psych: Normal affect  MSK: No gross deformities         Data:   Labs and imaging below were independently reviewed and interpreted    BMP  Recent Labs  Lab 07/01/18  0541 06/30/18  1605 06/27/18  1022    140  --    POTASSIUM 3.5 3.3* 3.8   CHLORIDE 112* 104  --    [place] 8.4* 8.7  --    CO2 23 28  --    BUN 8 14  --    CR 0.59* 0.63*  --    * 120*  --      CBC  Recent Labs  Lab 07/01/18  0541  06/30/18  1605   WBC 7.4  --  12.6*   RBC 4.22*  --  4.33*   HGB 11.3*  < > 11.7*   HCT 35.5*  --  35.0*   MCV 84  --  81   MCH 26.8  --  27.0   MCHC 31.8  --  33.4   RDW 15.5*  --  15.8*     --  228   < > = values in this interval not displayed.  INR  Recent Labs  Lab 07/01/18  0541 06/30/18  1605   INR 1.12 1.13     LFTs  Recent Labs  Lab 07/01/18  0541 06/30/18  1605   ALKPHOS 89 97   AST 18 18   ALT 20 21   BILITOTAL 0.9 0.6   PROTTOTAL 7.1 7.8   ALBUMIN 3.4 3.8      PANC  Recent Labs  Lab 06/30/18  2059   LIPASE 102       Imaging:  3/14/18  Impression:   Cirrhotic appearance of the liver with evidence of portal hypertension  including splenomegaly. No focal hepatic lesion.

## 2018-07-01 NOTE — SUMMARY OF CARE
Pt arrived to  [facility] 1930 had along w/ him shorts, shoes, shirt, cell phone, car keys and bag.  Iza Amaro on 6/30/2018 at 7:48 PM

## 2018-07-01 NOTE — PLAN OF CARE
Problem: Gastrointestinal Bleeding (Adult)  Goal: Signs and Symptoms of Listed Potential Problems Will be Absent, Minimized or Managed (Gastrointestinal Bleeding)  Signs and symptoms of listed potential problems will be absent, minimized or managed by discharge/transition of care (reference Gastrointestinal Bleeding (Adult) CPG).   Outcome: No Change  Patient arrived about 1945 from the ER. He is alert and oriented and gets up and walks by himself. Still very nauseated. He received some compazine and slept for a bit after that. On protonix and octreotide drips and IV fluids. Would like to try oral potassium instead of IV as has had in the past and it burned. 2 PIV's are in place. He is frustrated by noises coming form his room mates machine. Continue to monitor for bloody emesis. Has sor on buttocks and a GABRIELA drain coming from his lower back with serosanguinous output.

## 2018-07-01 NOTE — PROGRESS NOTES
Perioperative Pain Service Cross Cover Note    Called to ED for evaluation of chronic pain patient known to Dr Sauceda.  In brief, patient is a 58 yo M with a complex medical history who is s/p a spinal cord stimulator replacement 2 days ago.  Stimulator will be activated next week once he follows up in clinic.  He presented to the ED today with hematemesis, estimated ~200mL per ED staff.  Was called to evaluate if this was connected in any way to this procedure.  He is not on any anticoagulants.  Prior to the procedure, he had been vomiting about once daily for about one week after starting potassium supplements.  I suspect that this was likely a Colleen-Penn tear given his multiple episodes of emesis on top of the stress of surgery, but do not believe that this was a complication of the procedure.      This case was discussed with Dr Grimm, attending anesthesiologist of the chronic pain service who will follow up with this patient in the AM.      Pako Skelton MD  Anesthesiology, PGY2

## 2018-07-01 NOTE — PLAN OF CARE
Problem: Patient Care Overview  Goal: Plan of Care/Patient Progress Review  /87.  MD aware.  Home BP med started.  Other vitals stable.  Up to bathroom with stand by assist of one.  Nausea improving with Compazine and Zofran.  Octreotide and Protonix drips continue as ordered.  GABRIELA and back incision sites intact.  Voiding adequately.  Latest HGB 11.3.  No signs of bleeding.  Plan for possible EGD tomorrow under general.

## 2018-07-01 NOTE — PHARMACY-ADMISSION MEDICATION HISTORY
Admission medication history interview status for the 6/30/2018 admission is complete. See Epic admission navigator for allergy information, pharmacy, prior to admission medications and immunization status.     Medication history interview sources:  Patient and Med list     Changes made to PTA medication list (reason)  Added: None    Deleted: None   Changed: none     Additional medication history information (including reliability of information, actions taken by pharmacist):  -Reliability: Patient understands his med well  -Pharmacy: [facility]/pharmacy #[id number] - [place], [place] ( phone: [phone])  -Allergies: Confirmed   - Pt confirmed didn't take any other OTCs, herbals, topical, or supplements  - Promethazine 25mg, Pt reported that it wasn't helpful to treat his nausea.   - Hydrochlorothiazide 25mg- Pt reported the side effect of hypokaliemia and not want to take the med  - Omeprazole 40mg- Pt stopped taking 1 years ago-because he stopped drinking so he didn't experienced any heart burn symptom.        Prior to Admission medications    Medication Sig Last Dose Taking? Auth Provider   baclofen (LIORESAL) 10 MG tablet TAKE 2 TABLETS BY MOUTH THREE TIMES DAILY 6/30/2018 at Unknown time Yes Wegener, Joel Daniel Irwin, MD   BASAGLAR 100 UNIT/ML injection Inject 26 Units Subcutaneous daily 6/30/2018 at Unknown time Yes Wegener, Joel Daniel Irwin, MD   cephALEXin (KEFLEX) 500 MG capsule Take 1 capsule (500 mg) by mouth 4 times daily 6/30/2018 at Unknown  Yes Elvis Sauceda MD   clindamycin (CLEOCIN) 300 MG capsule Take 1 capsule (300 mg) by mouth 4 times daily 6/30/2018 at Unknown Yes Elvis Sauceda MD   hydrochlorothiazide (HYDRODIURIL) 25 MG tablet TAKE 1 TABLET (25 MG) BY MOUTH DAILY  Patient taking differently: AM Past Week at Unknown time Yes Ksenia Drummond MD   oxyCODONE IR (ROXICODONE) 10 MG tablet Take 1 tablet (10 mg) by mouth every 6 hours as needed for moderate to  severe pain 6/30/2018 at 1400 Yes Elvis Sauceda MD   promethazine (PHENERGAN) 25 MG tablet TAKE 1 TABLET (25 MG) BY MOUTH EVERY 6 HOURS AS NEEDED FOR NAUSEA Past Month at Unknown time Yes Wegener, Joel Daniel Irwin, MD   propranolol (INDERAL) 20 MG tablet TAKE 1 TABLET (20 MG) BY MOUTH 3 TIMES DAILY 6/30/2018 at Unknown time Yes Wegener, Joel Daniel Irwin, MD   sertraline (ZOLOFT) 100 MG tablet Take 2 tablets (200 mg) by mouth daily  Patient taking differently: Take 200 mg by mouth every morning  6/30/2018 at Unknown time Yes Wegener, Joel Daniel Irwin, MD   Syringe, Disposable, 25 ML MISC Syringe to be used for nasal irrigation  at   Yes Antonio Chávez MD   ACE/ARB NOT PRESCRIBED, INTENTIONAL, ACE & ARB not prescribed due to Allergy More than a month at Unknown time  Ksenia Bean APRN CNP   albuterol (PROAIR HFA, PROVENTIL HFA, VENTOLIN HFA) 108 (90 BASE) MCG/ACT inhaler Inhale 2 puffs into the lungs every 6 hours as needed for shortness of breath / dyspnea or wheezing Ventolin or other covered brand  Patient taking differently: Inhale 2 puffs into the lungs as needed for shortness of breath / dyspnea or wheezing Ventolin or other covered brand More than a month at Unknown time  Wegener, Joel Daniel Irwin, MD   Alcohol Swabs (ALCOHOL PADS) 70 % PADS 1 Box 4 times daily   Wegener, Joel Daniel Irwin, MD   ammonium lactate (LAC-HYDRIN) 12 % cream Apply topically 2 times daily as needed for dry skin More than a month at Unknown time  Victor M Anaya DPM   BD ROSE U/F 32G X 4 MM insulin pen needle USE ONCE DAILY AS DIRECTED   Wegener, Joel Daniel Irwin, MD   blood glucose monitoring (NO BRAND SPECIFIED) test strip Use to test blood sugars 1 time daily. ACCU-CHEK KARISHMA BRAND PLEASE.   Wegener, Joel Daniel Irwin, MD   blood glucose monitoring (NO BRAND SPECIFIED) test strip Use to test blood sugars one time daily or as directed    Accucheck Richards plus test strips    "Wegener, Joel Daniel Irwin, MD   blood glucose monitoring (SOFTCLIX) lancets Use to test blood sugar 1 time daily or as directed.   Wegener, Joel Daniel Irwin, MD   Blood Glucose Monitoring Suppl (ACURA BLOOD GLUCOSE METER) W/DEVICE KIT 1 Dose 2 times daily   Estrella Bowman, NP   COMPRESSION STOCKINGS Knee high compression socks 30-40-mm   Wegener, Joel Daniel Irwin, MD   mupirocin (BACTROBAN) 2 % ointment Apply to anterior nares BID X 2 month supply  Patient taking differently: Apply topically as needed Apply to anterior nares BID X 2 month supply More than a month at Unknown time  Antonio Chávez MD   omeprazole (PRILOSEC) 40 MG capsule Take 1 capsule (40 mg) by mouth daily Take 30-60 minutes before a meal.  Patient taking differently: Take 40 mg by mouth daily as needed Take 30-60 minutes before a meal. More than a month at Unknown time  Wegener, Joel Daniel Irwin, MD   order for DME Equipment being ordered: wound care supplies  CONVATEC DUODERM  Extra Thin Dressing - 1 1/4\" x 1 1/2\", Spots, oval  [id number]  Wound measurements  3 cm x 3 cm x 0.1 cm lesions with wound area of 1.5 x 1.5 that has slight drainage, full thickness located on the right sacral region   Delilah Molina MD   order for DME   Location:sacral ulcer unknown etiology possible sheer    measurement: 2.5 cm x 2 cm x 0.1 cm Unlikely to be a  pressure ulcer but if it is it's a stg 2,  partial thickness, low eexudate    Wound supplies :  5 each Comfeel  Plus Transparent 3530 2 x 2 3/4''  5x7 cm  [facility]     wound care orders: cleans wound, dry thoroughly  and replace Comfeel. Leave in place for 7 days for 5 weeks   Delilah Molina MD   order for DME Equipment being ordered: one donut for sitting   Wegener, Joel Daniel Irwin, MD   ORDER FOR DME Equipment being ordered: BP cuff   Estrella Bowman NP         Medication history completed by: Renea Hauser, Student Pharmacist, PD1    "

## 2018-07-01 NOTE — PROGRESS NOTES
[facility], [place]    Internal Medicine Progress Note - [facility]    Main Plans for Today   -pending EGD with MAC per EGD    Assessment & Plan   Erwin Aguilar is a 57 year old male admitted on 6/30/2018. Erwin Aguilar is a 57 year old male admitted on 6/30/2018. He has a history of alcoholic cirrhosis, large > 5mm esophageal varice s/p banding in 7/2014, GERD, HTN, and DM2, and is admitted for 1 week history of nausea and vomiting with 1 episode of hematemesis concerning for UGIB.      # Hematemesis  Differential includes Colleen Penn tear, esophageal varices (hx of), GERD, PUD. 1 episode of hematemesis yesterday afternoon, none since then. Today, Hgb 11.3. Patient is typed and screened.  MELD-Na score: 7 at 7/1/2018  5:41 AM  MELD score: 7 at 7/1/2018  5:41 AM  Calculated from:  Serum Creatinine: 0.59 mg/dL (Rounded to 1) at 7/1/2018  5:41 AM  Serum Sodium: 142 mmol/L (Rounded to 137) at 7/1/2018  5:41 AM  Total Bilirubin: 0.9 mg/dL (Rounded to 1) at 7/1/2018  5:41 AM  INR(ratio): 1.12 at 7/1/2018  5:41 AM  Age: 57 years  -GI consulted, recommendations:             -pending EGD                                   -protonix gtt                        -octreotide IV                        -ceftriaxone IV                        -NPO for procedure  -NS mIVF while NPO  -2 large bore IV, 18 gauge     # Nausea, ongoing  Nausea started after taking K replacement tablets. Differential includes: PONV. Zofran did not help. Reglan helped patient in the ED.   -zofran, reglan, compazine prn     # Abdominal pain,   Mild by history and mostly described as discomfort associated with nausea in the setting of a reassuring abdominal exam with minimal tenderness. Differential includes medication side effect (hx of n/v with K replacement tablet), GERD, or PUD. Lipase was wnl. Abdominal pain has improved.   -Pain control with tylenol      Chronic/Resolved Problems:  # Hypokalemia, resolved  K 3.5.  Likely 2/2 to GI loss from vomiting.   -K replacement protocol     # Leukocytosis  POD#2 spinal cord stimulator explant and reimplantation. Afebrile. Pt has cough, CXR negative. No urinary or bowel symptoms. Today, WBC 7.4.  -Monitor with CBC  -Consult neurosurgery regarding patient's recent surgery    #DM2  Hemoglobin A1C 6.2.   -Hypoglycemia protocol  -Hold PTA basaglar 26 units     #HTN  #HLD  -190s/80s  -Restart PTA HCTZ. Hold home propanolol     #MDD  #JATIN  -Sertraline 200 mg daily     #GERD  -Hold PTA omeprazole  -Protonix gtt    # Pain Assessment:  Current Pain Score 7/1/2018   Patient currently in pain? denies   Pain score (0-10) -   Pain location -   Pain descriptors -   - Erwin is experiencing pain due to nausea/vomiting vs GERD vs PUD. Pain management was discussed and the plan was created in a collaborative fashion.  Erwin's response to the current recommendations: compliant  - Please see the plan for pain management as documented above    Diet: NPO per Anesthesia Guidelines for Procedure/Surgery Except for: Meds  Fluids: NS mIVF  DVT Prophylaxis: Pneumatic Compression Devices  Code Status: Full Code    Disposition Plan   Expected discharge: Tomorrow, recommended to prior living arrangement once adequate pain management/ tolerating PO medications, hemoglobin stable and safe disposition plan/ TCU bed available.     Entered: Digna MORFIN Her 07/01/2018, 12:11 PM   Information in the above section will display in the discharge planner report.      The patient's care was discussed with the Attending Physician, Dr. Duffy.    Digna MORFIN Her  [facility]: 1  Pager: [phone]  Please see sticky note for cross cover information    Interval History   Nursing note reviewed. Continues to have nausea, controlled with reglan and compazine. Abdominal started a week ago with the nausea and vomiting. Abdominal pain has improved. 4 point ROS must be included: hematemesis, melena,  hematochezia    Physical Exam   Vital Signs: Temp: 96.9  F (36.1  C) Temp src: Oral BP: 188/87 Pulse: 84 Heart Rate: 64 Resp: 16 SpO2: 94 % O2 Device: None (Room air)    Weight: 227 lbs 14.4 oz  General: Alert, cooperative, no acute distress, appears nontoxic, sitting up at side of bed  Eyes:  scleral icterus  HEENT: NC/AT  CV: RRR, no m/r/g; intact & symmetric radial pulses  Lungs: CTAB, no wheezes, or crackles. Normal respiratory effort  Abd: Soft, tenderness to palpation of epigastric region and suprapubic area, + bowel sounds, abdominal binder in place to cover surgical wounds in the back  Ext: WWP, no BLE edema  Skin: No rash or jaundice  Neuro: AOx4, moves upper extremities equally    Data   Data     Recent Labs  Lab 07/01/18  0541 07/01/18  0016 06/30/18  2059 06/30/18  1605 06/27/18  1022   WBC 7.4  --   --  12.6*  --    HGB 11.3* 10.8*  --  11.7*  --    MCV 84  --   --  81  --      --   --  228  --    INR 1.12  --   --  1.13  --      --   --  140  --    POTASSIUM 3.5  --   --  3.3* 3.8   CHLORIDE 112*  --   --  104  --    CO2 23  --   --  28  --    BUN 8  --   --  14  --    CR 0.59*  --   --  0.63*  --    ANIONGAP 8  --   --  7  --    AFRICA 8.4*  --   --  8.7  --    *  --   --  120*  --    ALBUMIN 3.4  --   --  3.8  --    PROTTOTAL 7.1  --   --  7.8  --    BILITOTAL 0.9  --   --  0.6  --    ALKPHOS 89  --   --  97  --    ALT 20  --   --  21  --    AST 18  --   --  18  --    LIPASE  --   --  102  --   --      Recent Results (from the past 24 hour(s))   XR Chest 2 Views    Narrative    XR CHEST 2 VW 6/30/2018 7:30 PM    History: abd pain, chest pain;     Comparison: 11/9/2012    Findings: PA and lateral views of the chest. Heart size within normal  limits. No focal airspace opacities. Spinal stimulator device in the  epidural space. No focal airspace opacities. No significant pleural  effusion or pneumothorax. No acute bony abnormalities.      Impression    Impression:   No focal airspace  opacities.    I have personally reviewed the examination and initial interpretation  and I agree with the findings.    JAISON BRAGA MD

## 2018-07-01 NOTE — PLAN OF CARE
"Problem: Patient Care Overview  Goal: Plan of Care/Patient Progress Review  5B PT: PT orders acknowledged and appreciated. Attempted to evaluate patient in AM. Patient declined any therapy evaluations second to vomiting blood and therapies are \"not a priority at this time.\" Patient stated he had a previous bad experience with PT mobilizing him too soon in a hospital stay. Advised patient PT and OT to check back with him tomorrow. Cx.       "

## 2018-07-01 NOTE — H&P
"[facility]    Internal Medicine History and Physical - [facility] Service       Date of Admission:  6/30/2018    Chief Complaint   Nausea and vomiting, hematemesis x1    History is obtained from the patient and electronic health record    History of Present Illness   Erwin Aguilar is a 57 year old male  who has a history of alcoholic cirrhosis, large > 5mm esophageal varice s/p banding in 7/2014, GERD, HTN, DM2, MDD, and JATIN and is admitted for 1 week history of nausea and vomiting with 1 episode of hematemesis.     A week ago, patient was taking K replacement medication for low K levels. He developed nausea and vomiting for the past week. He was also taking clindamycin and cephalexin a few days before his surgery on 6/28 for a spinal cord stimulator explant and reimplantation. Following surgery, he has not slept well and the nausea and vomiting persists. This afternoon, patient has one episode of hematemesis described as \"bright, red clots\". He denies epistaxis, melena, or hematochezia. Pt has a history of alcoholic cirrhosis with large > 5mm esophageal varice s/p banding in 7/2014. His EGD on 5/2016 showed no esophageal or gastric varices. He stopped drinking 4.5 years ago.     The patient reports poor oral intake. He has not had teeth or dentures for 2.5 years. He eats yogurt, protein shakes, fruit, and ice cream. He doesn't check his blood sugars. He has low blood sugars more often because he doesn't eat often.     Review of Systems   CONSTITUTIONAL:  No fever, chills, has sweats  RESPIRATORY:  No SOB  CARDIOVASCULAR:  No chest pain  GASTROINTESTINAL:  Abdominal pain, nausea, vomiting  GENITOURINARY: urinary incontinence, unchanged, secondary to cauda equina syndrome  HEMATOLOGIC/LYMPHATIC:  No melena, hematochezia, or epistaxis  MUSCULOSKELETAL: has back pain at the chronic stabbing pain in right leg, unchanged  NEUROLOGICAL: no sensation in the legs bilaterally attributed to " cauda equina syndrome    Past Medical History    I have reviewed this patient's medical history and updated it with pertinent information if needed.   Past Medical History:   Diagnosis Date     Actinic keratosis     aldara     Anxiety      Cancer (H)     squamous cell skin CA     Cauda equina spinal cord injury (H)      Chronic sinusitis 5-1-16     Diastasis recti      Esophageal reflux      Esophageal varices in cirrhosis (H) 4/1/2014    Hospitalized for UGI blee 3/28/14, endoscopy revealed bleeding varices.     Essential hypertension, benign      Intermittent asthma      Mild depression (H)      Mixed hyperlipidemia      Nasal polyps 5-1-16     Other chronic pain      SCCA (squamous cell carcinoma) of skin      Seasonal allergic conjunctivitis      Type II or unspecified type diabetes mellitus without mention of complication, not stated as uncontrolled      Unspecified site of spinal cord injury without evidence of spinal bone injury     due to back surgery     Past Surgical History   I have reviewed this patient's surgical history and updated it with pertinent information if needed.  Past Surgical History:   Procedure Laterality Date     BACK SURGERY  August 2009     C APPENDECTOMY  1974     COLONOSCOPY N/A 5/12/2016    Procedure: COMBINED COLONOSCOPY, SINGLE OR MULTIPLE BIOPSY/POLYPECTOMY BY BIOPSY;  Surgeon: Ana Paula Urbina MD;  Location:  GI     ENDOSCOPY UPPER, COLONOSCOPY, COMBINED  10/19/2011    Procedure:COMBINED ENDOSCOPY UPPER, COLONOSCOPY; Upper Endoscopy, Colonoscopy with Polypectomy x4; Surgeon:AMBAR RODRÍGUEZ; Location: OR     ENT SURGERY  1-2016    Ongoing since then     ESOPHAGOSCOPY, GASTROSCOPY, DUODENOSCOPY (EGD), COMBINED  3/28/2014    Procedure: COMBINED ESOPHAGOSCOPY, GASTROSCOPY, DUODENOSCOPY (EGD);  EGD, Gastric Biopsies, Esophageal Banding;  Surgeon: Reyna Tovar MD;  Location:  OR     ESOPHAGOSCOPY, GASTROSCOPY, DUODENOSCOPY (EGD), COMBINED  6/9/2014     Procedure: COMBINED ESOPHAGOSCOPY, GASTROSCOPY, DUODENOSCOPY (EGD);  Surgeon: Curtis Mendez MD;  Location: [facility] GI     ESOPHAGOSCOPY, GASTROSCOPY, DUODENOSCOPY (EGD), COMBINED  7/24/2014    Procedure: COMBINED ESOPHAGOSCOPY, GASTROSCOPY, DUODENOSCOPY (EGD);  Surgeon: Gerard Samaniego MD;  Location: [facility] OR     ESOPHAGOSCOPY, GASTROSCOPY, DUODENOSCOPY (EGD), COMBINED N/A 10/31/2014    Procedure: COMBINED ESOPHAGOSCOPY, GASTROSCOPY, DUODENOSCOPY (EGD);  Surgeon: Gerard Samaniego MD;  Location: [facility] OR     ESOPHAGOSCOPY, GASTROSCOPY, DUODENOSCOPY (EGD), COMBINED N/A 5/12/2016    Procedure: COMBINED ESOPHAGOSCOPY, GASTROSCOPY, DUODENOSCOPY (EGD);  Surgeon: Ana Paula Urbina MD;  Location: [facility] GI     HCL SQUAMOUS CELL CARCINOMA AG  10/07    left forearm     HERNIORRHAPHY UMBILICAL  11/8/2012    Procedure: HERNIORRHAPHY UMBILICAL;  Open Umbilical Hernia Repair With Mesh ;  Surgeon: Chase Nicholson MD;  Location: [facility] OR     INSERT STIMULATOR DORSAL COLUMN N/A 6/28/2018    Procedure: INSERT STIMULATOR DORSAL COLUMN;;  Surgeon: Elvis Sauceda MD;  Location: [facility] OR     neuro stimulator  2010     REMOVE GENERATOR STIMULATOR (LOCATION) N/A 6/28/2018    Procedure: REMOVE GENERATOR STIMULATOR (LOCATION);  Spinal Cord Stimulator and IPG Explant and Re-Implant of SCS System (Leads and IPG);  Surgeon: Elvis Sauceda MD;  Location:  OR     SURGICAL HISTORY OF -   1/02    abcess tooth     SURGICAL HISTORY OF -   1999    L4-5 laminectomy, cauda equina syndrome     SURGICAL HISTORY OF -   +    herniated disk repair     TONSILLECTOMY  10 1964     TRANSPOSITION ULNAR NERVE (ELBOW)      right       Social History   Social History   Substance Use Topics     Smoking status: Former Smoker     Packs/day: 0.50     Years: 1.00     Types: Cigarettes     Start date: 10/13/1983     Quit date: 6/9/1984     Smokeless tobacco: Never Used     Alcohol use No      Comment: a pint of alcohol /  day - last drink was 3/28/14   Last drink was 4.5 years ago. He stopped drinking after esophageal varices. He lives with his significant other. He is disabled.    Family History   I have reviewed this patient's family history and updated it with pertinent information if needed.   Family History   Problem Relation Age of Onset     Cancer Mother      lung     Breast Cancer Mother      Other Cancer Mother      Cancer Father      esophogeal, GERD     Diabetes Brother      amputation, Type 1     Diabetes Brother      Diabetes Brother      Cancer - colorectal No family hx of        Prior to Admission Medications   Prior to Admission Medications   Prescriptions Last Dose Informant Patient Reported? Taking?   ACE/ARB NOT PRESCRIBED, INTENTIONAL, More than a month at Unknown time Self Yes No   Sig: ACE & ARB not prescribed due to Allergy   Alcohol Swabs (ALCOHOL PADS) 70 % PADS   No No   Si Box 4 times daily   BASAGLAR 100 UNIT/ML injection 2018 at Unknown time  No Yes   Sig: Inject 26 Units Subcutaneous daily   BD ROSE U/F 32G X 4 MM insulin pen needle   No No   Sig: USE ONCE DAILY AS DIRECTED   Blood Glucose Monitoring Suppl (ACPowered BLOOD GLUCOSE METER) W/DEVICE KIT   No No   Si Dose 2 times daily   COMPRESSION STOCKINGS   No No   Sig: Knee high compression socks 30-40-mm   ORDER FOR DME  Self No No   Sig: Equipment being ordered: BP cuff   Syringe, Disposable, 25 ML MISC  at    No Yes   Sig: Syringe to be used for nasal irrigation   albuterol (PROAIR HFA, PROVENTIL HFA, VENTOLIN HFA) 108 (90 BASE) MCG/ACT inhaler More than a month at Unknown time  No No   Sig: Inhale 2 puffs into the lungs every 6 hours as needed for shortness of breath / dyspnea or wheezing Ventolin or other covered brand   Patient taking differently: Inhale 2 puffs into the lungs as needed for shortness of breath / dyspnea or wheezing Ventolin or other covered brand   ammonium lactate (LAC-HYDRIN) 12 % cream More than a month at Unknown time   No No   Sig: Apply topically 2 times daily as needed for dry skin   baclofen (LIORESAL) 10 MG tablet 6/30/2018 at Unknown time  No Yes   Sig: TAKE 2 TABLETS BY MOUTH THREE TIMES DAILY   blood glucose monitoring (NO BRAND SPECIFIED) test strip   No No   Sig: Use to test blood sugars one time daily or as directed    Accucheck Richards plus test strips   blood glucose monitoring (NO BRAND SPECIFIED) test strip   No No   Sig: Use to test blood sugars 1 time daily. ACCU-CHEK KARISHMA BRAND PLEASE.   blood glucose monitoring (SOFTCLIX) lancets   No No   Sig: Use to test blood sugar 1 time daily or as directed.   cephALEXin (KEFLEX) 500 MG capsule 6/30/2018 at Unknown   No Yes   Sig: Take 1 capsule (500 mg) by mouth 4 times daily   clindamycin (CLEOCIN) 300 MG capsule 6/30/2018 at Unknown  No Yes   Sig: Take 1 capsule (300 mg) by mouth 4 times daily   hydrochlorothiazide (HYDRODIURIL) 25 MG tablet Past Week at Unknown time  No Yes   Sig: TAKE 1 TABLET (25 MG) BY MOUTH DAILY   Patient taking differently: AM   mupirocin (BACTROBAN) 2 % ointment More than a month at Unknown time  No No   Sig: Apply to anterior nares BID X 2 month supply   Patient taking differently: Apply topically as needed Apply to anterior nares BID X 2 month supply   omeprazole (PRILOSEC) 40 MG capsule More than a month at Unknown time  No No   Sig: Take 1 capsule (40 mg) by mouth daily Take 30-60 minutes before a meal.   Patient taking differently: Take 40 mg by mouth daily as needed Take 30-60 minutes before a meal.   order for DME   No No   Sig: Equipment being ordered: one donut for sitting   order for DME   No No   Sig:   Location:sacral ulcer unknown etiology possible sheer    measurement: 2.5 cm x 2 cm x 0.1 cm Unlikely to be a  pressure ulcer but if it is it's a stg 2,  partial thickness, low eexudate    Wound supplies :  5 each Comfeel  Plus Transparent 3530 2 x 2 3/4''  5x7 cm  [facility]     wound care orders: cleans wound, dry thoroughly  and  "replace Comfeel. Leave in place for 7 days for 5 weeks   order for DME   No No   Sig: Equipment being ordered: wound care supplies  [facility]  Extra Thin Dressing - 1 1/4\" x 1 1/2\", Spots, oval  [id number]  Wound measurements  3 cm x 3 cm x 0.1 cm lesions with wound area of 1.5 x 1.5 that has slight drainage, full thickness located on the right sacral region   oxyCODONE IR (ROXICODONE) 10 MG tablet 6/30/2018 at 1400  No Yes   Sig: Take 1 tablet (10 mg) by mouth every 6 hours as needed for moderate to severe pain   promethazine (PHENERGAN) 25 MG tablet Past Month at Unknown time  No Yes   Sig: TAKE 1 TABLET (25 MG) BY MOUTH EVERY 6 HOURS AS NEEDED FOR NAUSEA   propranolol (INDERAL) 20 MG tablet 6/30/2018 at Unknown time  No Yes   Sig: TAKE 1 TABLET (20 MG) BY MOUTH 3 TIMES DAILY   sertraline (ZOLOFT) 100 MG tablet 6/30/2018 at Unknown time  No Yes   Sig: Take 2 tablets (200 mg) by mouth daily   Patient taking differently: Take 200 mg by mouth every morning       Facility-Administered Medications: None     Allergies   Allergies   Allergen Reactions     Lisinopril Anaphylaxis     Swollen tongue; inability to swallow; drooling; hives; swollen face, neck, angioedema     Acetaminophen      Hx of cirrhosis      Amlodipine      Swelling, hives, possible angioedema       Keflex [Cephalexin]      Patient reports that he has taken augmentin and tolerated in in 2/2017  ( Pt was on Keflex in June 2018- no allergic symptom experienced)      Morphine      b/p dropped and arms went numb  Hypotension     Quinolones Hives     Bactrim [Sulfamethoxazole W/Trimethoprim] Rash     Levaquin Swelling and Rash     Swelling in lip and tongue felt thick       Physical Exam   Vital Signs: Temp: 98.3  F (36.8  C) Temp src: Oral BP: 163/71 Pulse: 66 Heart Rate: 58 Resp: 15 SpO2: 93 % O2 Device: None (Room air)    Weight: 227 lbs 14.4 oz    General: Alert, cooperative, no acute distress, appears nontoxic  Eyes:  EOMI, no scleral " icterus  HEENT: NC/AT, OP clear and MMM, neck is supple  CV: RRR, no m/r/g; intact & symmetric radial pulses  Lungs: CTAB, no wheezes, or crackles. Normal respiratory effort  Abd: Soft, diffuse tenderness to palpation, + bowel sounds, abdominal binder in place to cover surgical wounds in the back  Ext: WWP, no BLE edema  Skin: No rash or jaundice  Neuro: AOx4, CN 2-12 grossly intact b/l, strength is 5/5 in UE b/l, normal sensation to light touch in upper extremities. No sensation to light touch in the lower extremities bilaterally, at baseline attributed to cauda equina syndrome. Strength 4/5 in left LE,     Assessment & Plan   Erwin Aguilar is a 57 year old male admitted on 6/30/2018. He has a history of alcoholic cirrhosis, large > 5mm esophageal varice s/p banding in 7/2014, GERD, HTN, and DM2, and is admitted for 1 week history of nausea and vomiting with 1 episode of hematemesis concerning for UGIB.    # Hematemesis  Differential includes Colleen Penn tear, esophageal varices (hx of), GERD, PUD. Hgb 11.7. Patient is typed and screened.  -GI consulted, recommendations:   -protonix gtt   -octreotide IV   -ceftriaxone IV   -trend hgb q6h   -NPO at midnight for procedure   -EGD tomorrow AM   -MELD labs tomorrow AM  -NS mIVF while NPO  -2 large bore IV, 18 gauge      # Nausea  Nausea started after taking K replacement tablets. Differential includes: PONV. Zofran did not help. Reglan helped patient in the ED.   -zofran, reglan, compazine prn    # Abdominal pain  Mild by history and mostly described as discomfort associated with nausea in the setting of a reassuring abdominal exam with minimal tenderness. Differential includes medication side effect (hx of n/v with K replacement tablet), constipation, gastroenteritis, or pancreatitis.   -lipase  -Monitor abdominal exam, consider further evaluation with imaging depending on clinical course    # Hypokalemia  K 3.3. Likely 2/2 to GI loss from vomiting.   -K replacement  protocol    # Leukocytosis  POD#2 spinal cord stimulator explant and reimplantation. Afebrile. Pt has cough, CXR negative. No urinary or bowel symptoms.   -Monitor with CBC  -Consult neurosurgery regarding patient's recent surgery    Chronic/Resolved Problems:  #DM2  Hemoglobin A1C 6.2.   -Hypoglycemia protocol  -Hold PTA basaglar 26 units    #HTN  #HLD  -Hold PTA HCTZ and propanolol. Treat pain/nausea and consider prn if significant persistent HTN with SBP >160. Likely resume hypertension medications when bleeding is resolved.    #MDD  #JATIN  -Sertraline 200 mg daily    #GERD  -Hold PTA omeprazole  -Protonix gtt    # Pain Assessment:  Current Pain Score 6/30/2018   Patient currently in pain? yes   Pain score (0-10) -   Pain location Abdomen   Pain descriptors -   - Erwin is experiencing pain due to medication side effect. Pain management was discussed and the plan was created in a collaborative fashion.  Patient has a hx of cirrhosis, sober for 4.5 years with unremarkable liver panel. Okay to give tylenol. Erwin's response to the current recommendations: compliant  - Please see the plan for pain management as documented above  -tylenol prn, maximum dose 2 g/day      Diet: NPO per Anesthesia Guidelines for Procedure/Surgery Except for: Meds  Fluids: NS mIVF  DVT Prophylaxis: Pneumatic Compression Devices  Code Status: Full Code    Disposition Plan   Expected discharge: 2 - 3 days; recommended to prior living arrangement once adequate pain management/ tolerating PO medications, safe disposition plan/ TCU bed available and pending EGD procedure.     Entered: Digna Garcia 06/30/2018, 8:28 PM   Information in the above section will display in the discharge planner report.    The patient was discussed with Dr. Lilia Sawyer S Her  [facility] [place] [facility]  [facility]   Pager: [phone]  Please see sticky note for cross cover information      Data   Data     Recent Labs  Lab 06/30/18  1605 06/27/18  1022   WBC  12.6*  --    HGB 11.7*  --    MCV 81  --      --    INR 1.13  --      --    POTASSIUM 3.3* 3.8   CHLORIDE 104  --    CO2 28  --    BUN 14  --    CR 0.63*  --    ANIONGAP 7  --    AFRICA 8.7  --    *  --    ALBUMIN 3.8  --    PROTTOTAL 7.8  --    BILITOTAL 0.6  --    ALKPHOS 97  --    ALT 21  --    AST 18  --      Recent Results (from the past 24 hour(s))   XR Chest 2 Views    Narrative    XR CHEST 2 VW 6/30/2018 7:30 PM    History: abd pain, chest pain;     Comparison: 11/9/2012    Findings: PA and lateral views of the chest. Heart size within normal  limits. No focal airspace opacities. Spinal stimulator device in the  epidural space. No focal airspace opacities. No significant pleural  effusion or pneumothorax. No acute bony abnormalities.      Impression    Impression:   No focal airspace opacities.         Physician Attestation   I, Antonio Rodrigues, saw this patient with the resident and agree with the resident and/or medical student's findings and plan of care as documented in the note.      I personally reviewed vital signs, medications, labs and imaging.    Antonio Rodrigues MD  Date of Service (when I saw the patient): 6/30/18

## 2018-07-01 NOTE — PLAN OF CARE
Problem: Patient Care Overview  Goal: Plan of Care/Patient Progress Review  OT/5B: OT orders received, per PT note and consult, pt declining OT services this date. Rescheduled to tomorrow.

## 2018-07-02 VITALS
WEIGHT: 227.9 LBS | HEIGHT: 71 IN | DIASTOLIC BLOOD PRESSURE: 78 MMHG | TEMPERATURE: 98.4 F | SYSTOLIC BLOOD PRESSURE: 168 MMHG | OXYGEN SATURATION: 94 % | RESPIRATION RATE: 18 BRPM | HEART RATE: 80 BPM | BODY MASS INDEX: 31.9 KG/M2

## 2018-07-02 LAB
ALBUMIN SERPL-MCNC: 3.8 G/DL (ref 3.4–5)
ALP SERPL-CCNC: 93 U/L (ref 40–150)
ALT SERPL W P-5'-P-CCNC: 21 U/L (ref 0–70)
ANION GAP SERPL CALCULATED.3IONS-SCNC: 10 MMOL/L (ref 3–14)
AST SERPL W P-5'-P-CCNC: 16 U/L (ref 0–45)
BILIRUB SERPL-MCNC: 0.7 MG/DL (ref 0.2–1.3)
BUN SERPL-MCNC: 6 MG/DL (ref 7–30)
CALCIUM SERPL-MCNC: 8.5 MG/DL (ref 8.5–10.1)
CHLORIDE SERPL-SCNC: 107 MMOL/L (ref 94–109)
CO2 SERPL-SCNC: 22 MMOL/L (ref 20–32)
CREAT SERPL-MCNC: 0.58 MG/DL (ref 0.66–1.25)
GFR SERPL CREATININE-BSD FRML MDRD: >90 ML/MIN/1.7M2
GLUCOSE BLDC GLUCOMTR-MCNC: 120 MG/DL (ref 70–99)
GLUCOSE SERPL-MCNC: 114 MG/DL (ref 70–99)
INR PPP: 1.1 (ref 0.86–1.14)
POTASSIUM SERPL-SCNC: 3.2 MMOL/L (ref 3.4–5.3)
PROT SERPL-MCNC: 8 G/DL (ref 6.8–8.8)
SODIUM SERPL-SCNC: 139 MMOL/L (ref 133–144)

## 2018-07-02 PROCEDURE — 25000131 ZZH RX MED GY IP 250 OP 636 PS 637: Mod: GY | Performed by: PEDIATRICS

## 2018-07-02 PROCEDURE — 36415 COLL VENOUS BLD VENIPUNCTURE: CPT | Performed by: INTERNAL MEDICINE

## 2018-07-02 PROCEDURE — A9270 NON-COVERED ITEM OR SERVICE: HCPCS | Mod: GY | Performed by: PEDIATRICS

## 2018-07-02 PROCEDURE — 25000125 ZZHC RX 250: Performed by: PEDIATRICS

## 2018-07-02 PROCEDURE — 80053 COMPREHEN METABOLIC PANEL: CPT | Performed by: INTERNAL MEDICINE

## 2018-07-02 PROCEDURE — A9270 NON-COVERED ITEM OR SERVICE: HCPCS | Mod: GY | Performed by: INTERNAL MEDICINE

## 2018-07-02 PROCEDURE — 00000146 ZZHCL STATISTIC GLUCOSE BY METER IP

## 2018-07-02 PROCEDURE — 25000132 ZZH RX MED GY IP 250 OP 250 PS 637: Mod: GY | Performed by: INTERNAL MEDICINE

## 2018-07-02 PROCEDURE — 99239 HOSP IP/OBS DSCHRG MGMT >30: CPT | Mod: GC | Performed by: INTERNAL MEDICINE

## 2018-07-02 PROCEDURE — 25000128 H RX IP 250 OP 636: Performed by: STUDENT IN AN ORGANIZED HEALTH CARE EDUCATION/TRAINING PROGRAM

## 2018-07-02 PROCEDURE — 25000132 ZZH RX MED GY IP 250 OP 250 PS 637: Mod: GY | Performed by: PEDIATRICS

## 2018-07-02 PROCEDURE — 25000128 H RX IP 250 OP 636: Performed by: PEDIATRICS

## 2018-07-02 PROCEDURE — 25000128 H RX IP 250 OP 636: Performed by: EMERGENCY MEDICINE

## 2018-07-02 PROCEDURE — A9270 NON-COVERED ITEM OR SERVICE: HCPCS | Mod: GY | Performed by: STUDENT IN AN ORGANIZED HEALTH CARE EDUCATION/TRAINING PROGRAM

## 2018-07-02 PROCEDURE — 85610 PROTHROMBIN TIME: CPT | Performed by: INTERNAL MEDICINE

## 2018-07-02 PROCEDURE — 25000132 ZZH RX MED GY IP 250 OP 250 PS 637: Mod: GY | Performed by: STUDENT IN AN ORGANIZED HEALTH CARE EDUCATION/TRAINING PROGRAM

## 2018-07-02 RX ORDER — METOCLOPRAMIDE 10 MG/1
10 TABLET ORAL EVERY 6 HOURS PRN
Qty: 30 TABLET | Refills: 0 | Status: SHIPPED | OUTPATIENT
Start: 2018-07-02 | End: 2018-07-11

## 2018-07-02 RX ORDER — OXYCODONE HYDROCHLORIDE 5 MG/1
5-10 TABLET ORAL EVERY 6 HOURS PRN
Status: DISCONTINUED | OUTPATIENT
Start: 2018-07-02 | End: 2018-07-02 | Stop reason: HOSPADM

## 2018-07-02 RX ORDER — ONDANSETRON 4 MG/1
4 TABLET, ORALLY DISINTEGRATING ORAL EVERY 6 HOURS PRN
Qty: 30 TABLET | Refills: 1 | Status: SHIPPED | OUTPATIENT
Start: 2018-07-02 | End: 2018-07-11

## 2018-07-02 RX ORDER — HYDRALAZINE HYDROCHLORIDE 20 MG/ML
10 INJECTION INTRAMUSCULAR; INTRAVENOUS EVERY 6 HOURS PRN
Status: DISCONTINUED | OUTPATIENT
Start: 2018-07-02 | End: 2018-07-02 | Stop reason: HOSPADM

## 2018-07-02 RX ADMIN — PROCHLORPERAZINE EDISYLATE 10 MG: 5 INJECTION INTRAMUSCULAR; INTRAVENOUS at 06:05

## 2018-07-02 RX ADMIN — BACLOFEN 10 MG: 10 TABLET ORAL at 06:54

## 2018-07-02 RX ADMIN — OMEPRAZOLE 40 MG: 20 CAPSULE, DELAYED RELEASE ORAL at 08:16

## 2018-07-02 RX ADMIN — PROPRANOLOL HYDROCHLORIDE 20 MG: 20 TABLET ORAL at 08:17

## 2018-07-02 RX ADMIN — SODIUM CHLORIDE 1000 ML: 9 INJECTION, SOLUTION INTRAVENOUS at 00:55

## 2018-07-02 RX ADMIN — HYDRALAZINE HYDROCHLORIDE 10 MG: 20 INJECTION INTRAMUSCULAR; INTRAVENOUS at 03:05

## 2018-07-02 RX ADMIN — SERTRALINE HYDROCHLORIDE 200 MG: 100 TABLET ORAL at 08:16

## 2018-07-02 RX ADMIN — HYDROCHLOROTHIAZIDE 25 MG: 25 TABLET ORAL at 08:17

## 2018-07-02 RX ADMIN — PROPRANOLOL HYDROCHLORIDE 20 MG: 20 TABLET ORAL at 14:36

## 2018-07-02 RX ADMIN — BACLOFEN 10 MG: 10 TABLET ORAL at 14:36

## 2018-07-02 RX ADMIN — SODIUM CHLORIDE 1000 ML: 9 INJECTION, SOLUTION INTRAVENOUS at 08:23

## 2018-07-02 RX ADMIN — METOCLOPRAMIDE 10 MG: 5 INJECTION, SOLUTION INTRAMUSCULAR; INTRAVENOUS at 03:40

## 2018-07-02 RX ADMIN — METOCLOPRAMIDE HYDROCHLORIDE 10 MG: 5 TABLET ORAL at 09:44

## 2018-07-02 RX ADMIN — ONDANSETRON 4 MG: 4 TABLET, ORALLY DISINTEGRATING ORAL at 08:15

## 2018-07-02 NOTE — PROVIDER NOTIFICATION
Hypertension:  Patient has been comfortable all shift. Denies pain and nausea. BP has been running high. 189/93 with pulse of 76 at HS. MD on call notified, Dr. Karla Pillaionsin notified at pager  [phone]. Order to resume propranolol which patient takes at home.

## 2018-07-02 NOTE — PLAN OF CARE
Problem: Nausea/Vomiting (Adult)  Goal: Identify Related Risk Factors and Signs and Symptoms  Related risk factors and signs and symptoms are identified upon initiation of Human Response Clinical Practice Guideline (CPG).   Outcome: No Change      Pt alert/ anxious overnight re: moving around in bed and getting up to go to bathroom.  .  His nausea was still bothering him and had one dose prn Reglan. Pt relieved to have some rest without all the chaos from previous roommates. Pt's BP was 208/96  Prior to receiving Propanol, and only  Got to 190/88  Afterwards. Notified MD and orders for PRN hydralazine were given and BP went to 181/79.  Pt restless stating he cannot lay on his back flat and that his back hurt. GABRIELA and back incision sites intact. Pt states he postponed his post op follow up while hospitalized.  Output increased with moving around. Pt   Voiding adequately. Hourly rounding complete, call light within reach. Plan:  Continue to monitor and follow POC. Monitor for signs  of bleeding, hypertension, and pain.   Plan for possible EGD tomorrow under general.

## 2018-07-02 NOTE — PLAN OF CARE
Problem: Gastrointestinal Bleeding (Adult)  Goal: Signs and Symptoms of Listed Potential Problems Will be Absent, Minimized or Managed (Gastrointestinal Bleeding)  Signs and symptoms of listed potential problems will be absent, minimized or managed by discharge/transition of care (reference Gastrointestinal Bleeding (Adult) CPG).   Outcome: Improving  No signs of GI bleed.  No bloody BM's. No emesis noted. Protonix and octreotide drips are discontinued. Patient is sipping on water. Am hemoglobin was 11.3. Patient denies pain and nausea and wants antinausea medication when it is available. He received Zofran around 6 pm, reglan around 8:45pm, and compazine about 10:30 pm. To restart on propranolol tonight as BP is elevated to 189/93.

## 2018-07-02 NOTE — PROVIDER NOTIFICATION
Notified  [facility] Night cross cover team re:  Patient's /96 at time of administration of PO  Propanol when it arrived from pharmacy.  Will recheck.

## 2018-07-02 NOTE — PROGRESS NOTES
Patient ready to discharge to home.  Dressing changed by Dr. Sauceda and GABRIELA also removed by Dr. Sauceda.  New ABD applied before discharge.  IVs removed.  All belongings sent home with patient.  Discharge orders discussed with, and understood by patient.  Patient left for home at 1450.

## 2018-07-02 NOTE — PROGRESS NOTES
Bleeding resolved  High BP all day  No discomfort per RN report  No new recurrent EV or HE that may contraindicate benefit of propanolol     - resume home propanolol 20 mg po tid  Karla Bryant MD  PGY3 Internal Medicine  [phone]

## 2018-07-02 NOTE — PROVIDER NOTIFICATION
Notified  [facility] cross cover team re:  Patient's BP  Still elevated at 189/86, 199/86 after administration of PO Propanol at

## 2018-07-03 ENCOUNTER — TELEPHONE (OUTPATIENT)
Dept: FAMILY MEDICINE | Facility: CLINIC | Age: 58
End: 2018-07-03

## 2018-07-03 ENCOUNTER — CARE COORDINATION (OUTPATIENT)
Dept: CARE COORDINATION | Facility: CLINIC | Age: 58
End: 2018-07-03

## 2018-07-03 LAB — COPATH REPORT: NORMAL

## 2018-07-03 RX ORDER — ONDANSETRON 4 MG/1
4 TABLET, FILM COATED ORAL EVERY 8 HOURS PRN
Qty: 30 TABLET | Refills: 0 | Status: SHIPPED | OUTPATIENT
Start: 2018-07-03 | End: 2018-07-11

## 2018-07-04 NOTE — DISCHARGE SUMMARY
[facility], [place]    Internal Medicine Discharge Summary- Da Service    Date of Admission:  6/30/2018  Date of Discharge:  7/2/2018  2:45 PM  Discharging Attending Provider: Dr. Patrizia Duffy  Discharge Team: Da 1    Discharge Diagnoses   Hematemesis  Nausea  Abdominal Pain  Hypokalemia  DM2  HTN  MDD  GERD    Follow-ups Needed After Discharge   Follow up with GI as previously scheduled on 7/11/18 for continued liver surveillance & electrolyte monitoring  Follow up with Pain as previously scheduled on 7/6/18 for post-op monitoring    Hospital Course   Erwin Aguilar was admitted on 6/30/2018 for evaluation of hematemesis. Please see the HPI dated 6/30 by Dr. Rodrigues for detailed presentation. In brief, patient with recent surgery for spinal cord stimulator explant & replacement who presented with one episode of hematemesis following nausea & vomiting.  The following problems were addressed during his hospitalization:    Hematemesis: Patient with history of alcoholic cirrhosis & previous EV banding (7/2014), but has abstained from alcohol. Hematemesis following one week of nausea & vomiting. GI consulted due to liver disease & concern for Colleen Penn tear. Patient started on PPI drip, IV octreotide, & IV Ceftriaxone. Patient was closely monitored & hemoglobin was stable between 11 & 12. He had no recurrent episodes & diet was advanced without issue. Recent US in March 2018 was stable from previous. No EGD per GI recommendations. Patient will have close Hepatology follow up as previously scheduled on 7/11/18. Patient discharged on home PPI.    Nausea  Vomiting  Hypokalemia  Patient with hypokalemia on admission, likely due to chronic hypokalemia & in the setting of nausea & vomiting. Patient was given K supplementation & diet was advanced. Patient with improved nausea. Requested ondansetron & metoclopramide for discharge. Scripts provided. Patient encouraged to eat potassium  rich foods & to continue to be monitored by GI as previously scheduled.    HTN: Given possible GI bleed, anti-hypertensives were initially held. Home HCTZ & propranolol were restarted before discharge with adequate BP control.     MDD/JATIN: Patient continued on home Sertraline    Recent Spinal cord Stimulator explant & replacement: Done prior to admission. Patient continued on home medications. Home antibiotics held while on Ceftriaxone. Will resume on discharge. Dr. Sauceda removed GABRIELA drain for the patient will inpatient prior to discharge & patient will follow up as previously scheduled on 7/6/18.     Discharge Pain Plan:  - During his hospitalization, Erwin experienced pain due to recent back surgery.  The pain plan for discharge was discussed with Erwin and the plan was created in a collaborative fashion.    - Chronic/continued opioids: Patient discharged to continue previously established opioid plan    Consultations This Hospital Stay   MEDICATION HISTORY IP PHARMACY CONSULT  GI HEPATOLOGY ADULT IP CONSULT  PHYSICAL THERAPY ADULT IP CONSULT  OCCUPATIONAL THERAPY ADULT IP CONSULT  VASCULAR ACCESS CARE ADULT IP CONSULT     Code Status   Full Code     The patient was discussed with Dr. Tati Diane [facility]  Pager: [phone]  ______________________________________________________________________    Physical Exam   Vital Signs:                    Weight: 227 lbs 14.4 oz    General Appearance: Alert, cooperative, in NAD  Respiratory: CTAB, no wheezing/crackles  Cardiovascular: RRR, no murmurs, symmetric distal pulses  GI: Soft, non-tender, non-distended. GABRIELA drain in right side of abdomen with minimal output  Skin: No concerning rashes/lesions  Neuro: Alert & oriented, moves all extremities spontaneously & equally    Significant Results and Procedures   Most Recent 3 CBC's:  Recent Labs   Lab Test  07/01/18   0541  07/01/18   0016  06/30/18   1605  06/18/18   0911   WBC  7.4   --    12.6*  7.5   HGB  11.3*  10.8*  11.7*  12.0*   MCV  84   --   81  82   PLT  152   --   228  204   ,   Results for orders placed or performed during the hospital encounter of 06/30/18   XR Chest 2 Views    Narrative    XR CHEST 2 VW 6/30/2018 7:30 PM    History: abd pain, chest pain;     Comparison: 11/9/2012    Findings: PA and lateral views of the chest. Heart size within normal  limits. No focal airspace opacities. Spinal stimulator device in the  epidural space. No focal airspace opacities. No significant pleural  effusion or pneumothorax. No acute bony abnormalities.      Impression    Impression:   No focal airspace opacities.    I have personally reviewed the examination and initial interpretation  and I agree with the findings.    JAISON BARGA MD     *Note: Due to a large number of results and/or encounters for the requested time period, some results have not been displayed. A complete set of results can be found in Results Review.     Pending Results   These results will be followed up by N/A  Unresulted Labs Ordered in the Past 30 Days of this Admission     No orders found from 5/1/2018 to 7/1/2018.        Primary Care Physician   Joel Daniel Wegener    Discharge Disposition   Discharged to home  Condition at discharge: Stable    Discharge Orders     Reason for your hospital stay   You were hospitalized for vomiting of blood. This was likely due to irritation of your esophagus/stomach. You are tolerating food & without recurrence of bleeding. Please follow up with GI as previously scheduled.     Follow Up and recommended labs and tests   As previously scheduled with GI on 7/11/18     Activity   Your activity upon discharge: activity as tolerated     Full Code     Diet   Follow this diet upon discharge: Regular Diet       Discharge Medications   Discharge Medication List as of 7/2/2018  1:45 PM      START taking these medications    Details   metoclopramide (REGLAN) 10 MG tablet Take 1 tablet (10 mg) by  mouth every 6 hours as needed (nausea and vomiting), Disp-30 tablet, R-0, Local Print      ondansetron (ZOFRAN-ODT) 4 MG ODT tab Take 1 tablet (4 mg) by mouth every 6 hours as needed for nausea or vomiting, Disp-30 tablet, R-1, Local Print         CONTINUE these medications which have NOT CHANGED    Details   ACE/ARB NOT PRESCRIBED, INTENTIONAL, ACE & ARB not prescribed due to Allergy, Historical      albuterol (PROAIR HFA, PROVENTIL HFA, VENTOLIN HFA) 108 (90 BASE) MCG/ACT inhaler Inhale 2 puffs into the lungs every 6 hours as needed for shortness of breath / dyspnea or wheezing Ventolin or other covered brand, Disp-3 Inhaler, R-3, E-Prescribe      Alcohol Swabs (ALCOHOL PADS) 70 % PADS 1 Box 4 times daily, Disp-100 each, R-3, E-Prescribe      ammonium lactate (LAC-HYDRIN) 12 % cream Apply topically 2 times daily as needed for dry skinDisp-385 g, [id number]      baclofen (LIORESAL) 10 MG tablet TAKE 2 TABLETS BY MOUTH THREE TIMES DAILY, Disp-180 tablet, R-11, E-Prescribe      BASAGLAR 100 UNIT/ML injection Inject 26 Units Subcutaneous daily, Disp-30 mL, R-11, E-PrescribeProfile Rx: patient will contact pharmacy when needed      BD ROSE U/F 32G X 4 MM insulin pen needle USE ONCE DAILY AS DIRECTEDDisp-100 each, [id number]      !! blood glucose monitoring (NO BRAND SPECIFIED) test strip Use to test blood sugars 1 time daily. ACCU-CHEK KARISHMA BRAND PLEASE., Disp-100 strip, R-3, E-Prescribe      !! blood glucose monitoring (NO BRAND SPECIFIED) test strip Use to test blood sugars one time daily or as directed    Accucheck Richards plus test stripsDisp-1 Box, R-5Fax      blood glucose monitoring (SOFTCLIX) lancets Use to test blood sugar 1 time daily or as directed., Disp-100 each, R-3, E-Prescribe      Blood Glucose Monitoring Suppl (ACURA BLOOD GLUCOSE METER) W/DEVICE KIT 1 Dose 2 times daily, Disp-1 kit, R-1, E-Prescribe      cephALEXin (KEFLEX) 500 MG capsule Take 1 capsule (500 mg) by mouth 4 times daily,  "Disp-56 capsule, R-0, E-Prescribe      clindamycin (CLEOCIN) 300 MG capsule Take 1 capsule (300 mg) by mouth 4 times daily, Disp-56 capsule, R-0, E-Prescribe      COMPRESSION STOCKINGS Knee high compression socks 30-40-mm, Disp-2 each, R-1, Fax      hydrochlorothiazide (HYDRODIURIL) 25 MG tablet TAKE 1 TABLET (25 MG) BY MOUTH DAILY, Disp-90 tablet, R-3, E-Prescribe      mupirocin (BACTROBAN) 2 % ointment Apply to anterior nares BID X 2 month supplyDisp-2 g, [id number]      omeprazole (PRILOSEC) 40 MG capsule Take 1 capsule (40 mg) by mouth daily Take 30-60 minutes before a meal., Disp-90 capsule, R-3, E-Prescribe      !! order for DME Equipment being ordered: wound care supplies  CONVATEC DUODERM  Extra Thin Dressing - 1 1/4\" x 1 1/2\", Spots, oval  [id number]  Wound measurements  3 cm x 3 cm x 0.1 cm lesions with wound area of 1.5 x 1.5 that has slight drainage, full thickness located  on the right sacral regionDisp-1 Box, R-3, Local Print      !! order for DME   Location:sacral ulcer unknown etiology possible sheer    measurement: 2.5 cm x 2 cm x 0.1 cm Unlikely to be a  pressure ulcer but if it is it's a stg 2,  partial thickness, low eexudate    Wound supplies :  5 each Comfeel  Plus Transparent 3530 2 x 2 3 /4''  5x7 cm  [facility]     wound care orders: cleans wound, dry thoroughly  and replace Comfeel. Leave in place for 7 days for 5 weeksDisp-5 each, R-3, Local Print      !! order for DME Equipment being ordered: one donut for sittingDisp-1 Device, R-0, Local Print      !! ORDER FOR DME Equipment being ordered: BP cuffDisp-1 Device, R-0, Local PrintPlease send a monitoring system that can check blood pressures on the arm.NOT the wrist type.  He needs both the cuff and the monitoring system      oxyCODONE IR (ROXICODONE) 10 MG tablet Take 1 tablet (10 mg) by mouth every 6 hours as needed for moderate to severe pain, Disp-40 tablet, R-0, Local Print      promethazine (PHENERGAN) 25 MG tablet TAKE 1 TABLET " (25 MG) BY MOUTH EVERY 6 HOURS AS NEEDED FOR NAUSEA, Disp-40 tablet, R-11, E-Prescribe      propranolol (INDERAL) 20 MG tablet TAKE 1 TABLET (20 MG) BY MOUTH 3 TIMES DAILY, Disp-90 tablet, R-0, E-Prescribe++ONE MONTH REFILL - PATIENT MUST SCHEDULE APPOINTMENT TO RECEIVE FUTURE REFILLS++      sertraline (ZOLOFT) 100 MG tablet Take 2 tablets (200 mg) by mouth daily, Disp-180 tablet, R-1, E-Prescribe      Syringe, Disposable, 25 ML MISC Syringe to be used for nasal irrigation, Disp-1 each, R-3, Fax       !! - Potential duplicate medications found. Please discuss with provider.        Allergies   Allergies   Allergen Reactions     Lisinopril Anaphylaxis     Swollen tongue; inability to swallow; drooling; hives; swollen face, neck, angioedema     Acetaminophen      Hx of cirrhosis      Amlodipine      Swelling, hives, possible angioedema       Keflex [Cephalexin]      Patient reports that he has taken augmentin and tolerated in in 2/2017  ( Pt was on Keflex in June 2018- no allergic symptom experienced)      Morphine      b/p dropped and arms went numb  Hypotension     Quinolones Hives     Bactrim [Sulfamethoxazole W/Trimethoprim] Rash     Levaquin Swelling and Rash     Swelling in lip and tongue felt thick

## 2018-07-05 NOTE — TELEPHONE ENCOUNTER
In hosp ED / 6/30-7/2 for hematemesis. Has f/u with pain clinic tomorrow and hepatologist next week.     Discharge Outreach Protocol    Patient Contact    Attempt # 1    Was call answered?  No.  Left message on voicemail with information to call me back.

## 2018-07-06 ENCOUNTER — OFFICE VISIT (OUTPATIENT)
Dept: ANESTHESIOLOGY | Facility: CLINIC | Age: 58
End: 2018-07-06
Payer: MEDICARE

## 2018-07-06 VITALS — HEART RATE: 63 BPM | DIASTOLIC BLOOD PRESSURE: 83 MMHG | OXYGEN SATURATION: 98 % | SYSTOLIC BLOOD PRESSURE: 178 MMHG

## 2018-07-06 DIAGNOSIS — T85.192D FAILURE OF SPINAL CORD STIMULATOR, SUBSEQUENT ENCOUNTER: Primary | ICD-10-CM

## 2018-07-06 ASSESSMENT — PAIN SCALES - GENERAL: PAINLEVEL: MILD PAIN (3)

## 2018-07-06 NOTE — PROGRESS NOTES
Subjective:    57 year old male presents for 7-day post-operative visit.    He underwent SPINAL CORD STIMULATOR EXPLANT AND RE-IMPLANTATION ([employer]) on 6/28/18.    His GABRIELA drain was pulled by myself on Monday July 2, while he was inpatient at [facility].    Past Medical History:   Diagnosis Date     Actinic keratosis     aldara     Anxiety      Cancer (H)     squamous cell skin CA     Cauda equina spinal cord injury (H)      Chronic sinusitis 5-1-16     Diastasis recti      Esophageal reflux      Esophageal varices in cirrhosis (H) 4/1/2014    Hospitalized for UGI blee 3/28/14, endoscopy revealed bleeding varices.     Essential hypertension, benign      Intermittent asthma      Mild depression (H)      Mixed hyperlipidemia      Nasal polyps 5-1-16     Other chronic pain      SCCA (squamous cell carcinoma) of skin      Seasonal allergic conjunctivitis      Type II or unspecified type diabetes mellitus without mention of complication, not stated as uncontrolled      Unspecified site of spinal cord injury without evidence of spinal bone injury     due to back surgery    past medical history reviewed with patient.   Past Surgical History:   Procedure Laterality Date     BACK SURGERY  August 2009     C APPENDECTOMY  1974     COLONOSCOPY N/A 5/12/2016    Procedure: COMBINED COLONOSCOPY, SINGLE OR MULTIPLE BIOPSY/POLYPECTOMY BY BIOPSY;  Surgeon: Ana Paula Urbina MD;  Location:  GI     ENDOSCOPY UPPER, COLONOSCOPY, COMBINED  10/19/2011    Procedure:COMBINED ENDOSCOPY UPPER, COLONOSCOPY; Upper Endoscopy, Colonoscopy with Polypectomy x4; Surgeon:AMBAR RODRÍGUEZ; Location: OR     ENT SURGERY  1-2016    Ongoing since then     ESOPHAGOSCOPY, GASTROSCOPY, DUODENOSCOPY (EGD), COMBINED  3/28/2014    Procedure: COMBINED ESOPHAGOSCOPY, GASTROSCOPY, DUODENOSCOPY (EGD);  EGD, Gastric Biopsies, Esophageal Banding;  Surgeon: Reyna Tovar MD;  Location:  OR     ESOPHAGOSCOPY, GASTROSCOPY,  DUODENOSCOPY (EGD), COMBINED  6/9/2014    Procedure: COMBINED ESOPHAGOSCOPY, GASTROSCOPY, DUODENOSCOPY (EGD);  Surgeon: Curtis Mendez MD;  Location: [facility] GI     ESOPHAGOSCOPY, GASTROSCOPY, DUODENOSCOPY (EGD), COMBINED  7/24/2014    Procedure: COMBINED ESOPHAGOSCOPY, GASTROSCOPY, DUODENOSCOPY (EGD);  Surgeon: Gerard Samaniego MD;  Location: [facility] OR     ESOPHAGOSCOPY, GASTROSCOPY, DUODENOSCOPY (EGD), COMBINED N/A 10/31/2014    Procedure: COMBINED ESOPHAGOSCOPY, GASTROSCOPY, DUODENOSCOPY (EGD);  Surgeon: Gerard Samaniego MD;  Location: [facility] OR     ESOPHAGOSCOPY, GASTROSCOPY, DUODENOSCOPY (EGD), COMBINED N/A 5/12/2016    Procedure: COMBINED ESOPHAGOSCOPY, GASTROSCOPY, DUODENOSCOPY (EGD);  Surgeon: Ana Paula Urbina MD;  Location: [facility] GI     HCL SQUAMOUS CELL CARCINOMA AG  10/07    left forearm     HERNIORRHAPHY UMBILICAL  11/8/2012    Procedure: HERNIORRHAPHY UMBILICAL;  Open Umbilical Hernia Repair With Mesh ;  Surgeon: Chase Nicholson MD;  Location: [facility]     INSERT STIMULATOR DORSAL COLUMN N/A 6/28/2018    Procedure: INSERT STIMULATOR DORSAL COLUMN;;  Surgeon: Elvis Sauceda MD;  Location:  OR     neuro stimulator  2010     REMOVE GENERATOR STIMULATOR (LOCATION) N/A 6/28/2018    Procedure: REMOVE GENERATOR STIMULATOR (LOCATION);  Spinal Cord Stimulator and IPG Explant and Re-Implant of SCS System (Leads and IPG);  Surgeon: Elvis Sauceda MD;  Location:  OR     SURGICAL HISTORY OF -   1/02    abcess tooth     SURGICAL HISTORY OF -   1999    L4-5 laminectomy, cauda equina syndrome     SURGICAL HISTORY OF -   +    herniated disk repair     TONSILLECTOMY  10 1964     TRANSPOSITION ULNAR NERVE (ELBOW)      right    past surgical history reviewed with patient.     Medications:    Current Outpatient Prescriptions   Medication     ACE/ARB NOT PRESCRIBED, INTENTIONAL,     albuterol (PROAIR HFA, PROVENTIL HFA, VENTOLIN HFA) 108 (90 BASE) MCG/ACT inhaler      Alcohol Swabs (ALCOHOL PADS) 70 % PADS     ammonium lactate (LAC-HYDRIN) 12 % cream     baclofen (LIORESAL) 10 MG tablet     BASAGLAR 100 UNIT/ML injection     BD ROSE U/F 32G X 4 MM insulin pen needle     blood glucose monitoring (NO BRAND SPECIFIED) test strip     blood glucose monitoring (NO BRAND SPECIFIED) test strip     blood glucose monitoring (SOFTCLIX) lancets     Blood Glucose Monitoring Suppl (ACURA BLOOD GLUCOSE METER) W/DEVICE KIT     cephALEXin (KEFLEX) 500 MG capsule     clindamycin (CLEOCIN) 300 MG capsule     COMPRESSION STOCKINGS     hydrochlorothiazide (HYDRODIURIL) 25 MG tablet     metoclopramide (REGLAN) 10 MG tablet     mupirocin (BACTROBAN) 2 % ointment     omeprazole (PRILOSEC) 40 MG capsule     ondansetron (ZOFRAN) 4 MG tablet     ondansetron (ZOFRAN-ODT) 4 MG ODT tab     order for DME     order for DME     order for DME     ORDER FOR DME     oxyCODONE IR (ROXICODONE) 10 MG tablet     promethazine (PHENERGAN) 25 MG tablet     propranolol (INDERAL) 20 MG tablet     sertraline (ZOLOFT) 100 MG tablet     Syringe, Disposable, 25 ML MISC     No current facility-administered medications for this visit.        [place] and [place] Prescription Monitoring Program reviewed    Allergies:     Allergies   Allergen Reactions     Lisinopril Anaphylaxis     Swollen tongue; inability to swallow; drooling; hives; swollen face, neck, angioedema     Acetaminophen      Hx of cirrhosis      Amlodipine      Swelling, hives, possible angioedema       Keflex [Cephalexin]      Patient reports that he has taken augmentin and tolerated in in 2/2017  ( Pt was on Keflex in June 2018- no allergic symptom experienced)      Morphine      b/p dropped and arms went numb  Hypotension     Quinolones Hives     Bactrim [Sulfamethoxazole W/Trimethoprim] Rash     Levaquin Swelling and Rash     Swelling in lip and tongue felt thick       Family History:  family history includes Breast Cancer in his mother; Cancer in his father and mother;  Diabetes in his brother, brother, and brother; Other Cancer in his mother. There is no history of Cancer - colorectal.    Review Of Systems    14 point ROS negative except per HPI    Objective:     There were no vitals taken for this visit.  There is no height or weight on file to calculate BMI.    General: In no apparent distress  Mental status: Normal affect, pleasant  Head: Atraumatic, normocephalic  Eyes: Extra-ocular movements grossly intact, no scleral icterus  Cardiovascular: RRR  Respiratory: CTAB  Abdomen: soft, non-distended  Msk: Bilateral hips, knees have normal range of motion. Pain with extension and rotation of lumbar spine  Neuro: AAOx3.   CN II-XII are grossly intact.   Back: well healed surgical incisions, dressings clean, dry, intact, GABRIELA site partially epithelialized  Skin: No rashes or lesions noted on exposed areas of skin  Lymph: no supraclavicular lymphadenopathy      Chaperone present during the entire Physical Exam: None    Imaging: Reviewed    Assessment:    Erwin is a 57-year-old male who presents to the pain clinic as a follow-up patient after his spinal cord stimulator explant and reimplantation surgery with [employer]. He underwent the surgery on June 28 and his GABRIELA drain was pulled on July 2, 2018, while he was an inpatient at the [facility]. At today's visit, he reports that he is feeling well and that his previous testicular pain and nerve pain has subsided. I do not have a definitive explanation for this, however, it may be due to the fact that the previous damage leads have been removed from his spine. At today's visit, the wounds are partially epithelialized and I removed the sutures and examined the GABRIELA site which was closed with Dermabond on July 2, 2018. The device will be turned on at today's visit by the [employer] representative.    Plan:     1. RTC in 3 weeks for 1-month post-op visit with [employer] Representative present  2. Continue PO antibiotics for another  week

## 2018-07-06 NOTE — MR AVS SNAPSHOT
After Visit Summary   7/6/2018    Erwin Aguilar    MRN: [id number]           Patient Information     Date Of Birth          1960        Visit Information        Provider Department      7/6/2018 10:00 AM Elvis Sauceda MD [facility]        Care Instructions    1. Spinal cord stimulator turned on today.      Follow up: 4 week post op follow up with VANCE Benz 7/27/18 at 8am.       To speak with a nurse, schedule/reschedule/cancel a clinic appointment, or request a medication refill call: [phone]     You can also reach us by Inmoo: [email or web address]    For refills, please call on Monday, 1 week before your medication runs out. The doctors are not always in clinic, so this gives us time to get your prescriptions ready.  Please let us know the name of the medication you are requesting a refill of.                                     Follow-ups after your visit        Your next 10 appointments already scheduled     Jul 11, 2018  7:00 AM CDT   Lab with  LAB   [facility] ([facility])    [address] [place] [id number] [phone]            Jul 11, 2018  8:00 AM CDT   (Arrive by 7:45 AM)   Return General Liver with Kimberly Ramirez MD   [facility] ([facility])    [address] [place] [address]  [address] [place] [id number] [phone]            Jul 24, 2018  9:20 AM CDT   Office Visit with Joel Daniel Irwin Wegener, MD   [facility] ([facility])    [address] [place] [address] [phone]           Bring a current list of meds and any records pertaining to this visit. For Physicals, please bring immunization records and any forms needing to be filled out. Please arrive 10 minutes early to complete paperwork.            Jul 27, 2018  8:00 AM CDT    (Arrive by 7:45 AM)   Return Visit with VANCE Benz CNP   [facility] ([facility])    [address] [facility]  [address] [facility] [address] [phone]              Who to contact     Please call your clinic at [phone] to:    Ask questions about your health    Make or cancel appointments    Discuss your medicines    Learn about your test results    Speak to your doctor            Additional Information About Your Visit        More[employer] Information     CrowdTangle gives you secure access to your electronic health record. If you see a primary care provider, you can also send messages to your care team and make appointments. If you have questions, please call your primary care clinic.  If you do not have a primary care provider, please call [phone] and they will assist you.      CrowdTangle is an electronic gateway that provides easy, online access to your medical records. With CrowdTangle, you can request a clinic appointment, read your test results, renew a prescription or communicate with your care team.     To access your existing account, please contact your [facility] or call [phone] for assistance.        Care EveryWhere ID     This is your Care EveryWhere ID. This could be used by other organizations to access your [facility] medical records  [id number]        Your Vitals Were     Pulse Pulse Oximetry                63 98%           Blood Pressure from Last 3 Encounters:   07/06/18 178/83   07/02/18 168/78   06/28/18 132/62    Weight from Last 3 Encounters:   06/30/18 103.4 kg (227 lb 14.4 oz)   06/28/18 100.7 kg (222 lb)   06/18/18 101.9 kg (224 lb 11.2 oz)              Today, you had the following     No orders found for display         Today's Medication Changes          These changes are accurate as of 7/6/18 10:24 AM.  If you have any questions, ask your nurse or doctor.               These  medicines have changed or have updated prescriptions.        Dose/Directions    albuterol 108 (90 Base) MCG/ACT Inhaler   Commonly known as:  PROAIR HFA/PROVENTIL HFA/VENTOLIN HFA   This may have changed:    - when to take this  - additional instructions   Used for:  Moderate persistent asthma without complication        Dose:  2 puff   Inhale 2 puffs into the lungs every 6 hours as needed for shortness of breath / dyspnea or wheezing Ventolin or other covered brand   Quantity:  3 Inhaler   Refills:  3       hydrochlorothiazide 25 MG tablet   Commonly known as:  HYDRODIURIL   This may have changed:  See the new instructions.   Used for:  Hypertension goal BP (blood pressure) < 140/90        TAKE 1 TABLET (25 MG) BY MOUTH DAILY   Quantity:  90 tablet   Refills:  3       mupirocin 2 % ointment   Commonly known as:  BACTROBAN   This may have changed:    - how to take this  - when to take this  - reasons to take this  - additional instructions   Used for:  Nasal lesion, Nasal vestibulitis        Apply to anterior nares BID X 2 month supply   Quantity:  2 g   Refills:  3       omeprazole 40 MG capsule   Commonly known as:  priLOSEC   This may have changed:    - when to take this  - reasons to take this  - additional instructions   Used for:  Gastroesophageal reflux disease without esophagitis        Dose:  40 mg   Take 1 capsule (40 mg) by mouth daily Take 30-60 minutes before a meal.   Quantity:  90 capsule   Refills:  3       sertraline 100 MG tablet   Commonly known as:  ZOLOFT   This may have changed:  when to take this   Used for:  Generalized anxiety disorder        Dose:  200 mg   Take 2 tablets (200 mg) by mouth daily   Quantity:  180 tablet   Refills:  1                Primary Care Provider Office Phone # Fax #    Joel Daniel Irwin Wegener, [phone]  [facility] [address]        Equal Access to Services     LI GENAO AH: sonia Snider,  donis davila ah. So [facility] [phone].    ATENCIÓN: Si mariela fairbanks, tiene a camp disposición servicios gratuitos de asistencia lingüística. Neftaly al [phone].    We comply with applicable federal civil rights laws and [place] laws. We do not discriminate on the basis of race, color, national origin, age, disability, sex, sexual orientation, or gender identity.            Thank you!     Thank you for choosing [facility]  for your care. Our goal is always to provide you with excellent care. Hearing back from our patients is one way we can continue to improve our services. Please take a few minutes to complete the written survey that you may receive in the mail after your visit with us. Thank you!             Your Updated Medication List - Protect others around you: Learn how to safely use, store and throw away your medicines at www.disposemymeds.org.          This list is accurate as of 7/6/18 10:24 AM.  Always use your most recent med list.                   Brand Name Dispense Instructions for use Diagnosis    * ACE/ARB/ARNI NOT PRESCRIBED (INTENTIONAL)      ACE & ARB not prescribed due to Allergy        ACURA BLOOD GLUCOSE METER w/Device Kit     1 kit    1 Dose 2 times daily    Type 2 diabetes, HbA1c goal < 7% (H)       albuterol 108 (90 Base) MCG/ACT Inhaler    PROAIR HFA/PROVENTIL HFA/VENTOLIN HFA    3 Inhaler    Inhale 2 puffs into the lungs every 6 hours as needed for shortness of breath / dyspnea or wheezing Ventolin or other covered brand    Moderate persistent asthma without complication       Alcohol Pads 70 % Pads     100 each    1 Box 4 times daily    Type 2 diabetes, HbA1c goal < 7% (H)       ammonium lactate 12 % cream    LAC-HYDRIN    385 g    Apply topically 2 times daily as needed for dry skin    Xerosis cutis       baclofen 10 MG tablet    LIORESAL    180 tablet    TAKE 2 TABLETS BY MOUTH THREE TIMES  DAILY    Muscle spasms of both lower extremities, Back muscle spasm       BASAGLAR 100 UNIT/ML injection     30 mL    Inject 26 Units Subcutaneous daily    Type 2 diabetes mellitus without complication, with long-term current use of insulin (H)       BD ROSE U/F 32G X 4 MM   Generic drug:  insulin pen needle     100 each    USE ONCE DAILY AS DIRECTED    Type 2 diabetes mellitus without complication, with long-term current use of insulin (H)       blood glucose monitoring lancets     100 each    Use to test blood sugar 1 time daily or as directed.    Type 2 diabetes, HbA1c goal < 7% (H)       * blood glucose monitoring test strip    no brand specified    1 Box    Use to test blood sugars one time daily or as directed  Accucheck Richards plus test strips    Type 2 diabetes, HbA1c goal < 7% (H)       * blood glucose monitoring test strip    no brand specified    100 strip    Use to test blood sugars 1 time daily. ACCU-CHEK KARISHMA BRAND PLEASE.    Type 2 diabetes mellitus with diabetic polyneuropathy, with long-term current use of insulin (H)       cephALEXin 500 MG capsule    KEFLEX    56 capsule    Take 1 capsule (500 mg) by mouth 4 times daily    Failure of spinal cord stimulator (H)       clindamycin 300 MG capsule    CLEOCIN    56 capsule    Take 1 capsule (300 mg) by mouth 4 times daily    Failure of spinal cord stimulator (H)       COMPRESSION STOCKINGS     2 each    Knee high compression socks 30-40-mm    Lymphedema of both lower extremities       hydrochlorothiazide 25 MG tablet    HYDRODIURIL    90 tablet    TAKE 1 TABLET (25 MG) BY MOUTH DAILY    Hypertension goal BP (blood pressure) < 140/90       metoclopramide 10 MG tablet    REGLAN    30 tablet    Take 1 tablet (10 mg) by mouth every 6 hours as needed (nausea and vomiting)    Hematemesis with nausea       mupirocin 2 % ointment    BACTROBAN    2 g    Apply to anterior nares BID X 2 month supply    Nasal lesion, Nasal vestibulitis       omeprazole 40 MG  "capsule    priLOSEC    90 capsule    Take 1 capsule (40 mg) by mouth daily Take 30-60 minutes before a meal.    Gastroesophageal reflux disease without esophagitis       ondansetron 4 MG ODT tab    ZOFRAN-ODT    30 tablet    Take 1 tablet (4 mg) by mouth every 6 hours as needed for nausea or vomiting    Hematemesis with nausea       ondansetron 4 MG tablet    ZOFRAN    30 tablet    Take 1 tablet (4 mg) by mouth every 8 hours as needed for nausea    Hematemesis with nausea       * order for DME     1 Device    Equipment being ordered: BP cuff    Hypertension goal BP (blood pressure) < 140/90       * order for DME     1 Device    Equipment being ordered: one donut for sitting    Pressure ulcer, stage II       * order for DME     5 each    ?Location:sacral ulcer unknown etiology possible sheer ?measurement: 2.5 cm x 2 cm x 0.1 cm Unlikely to be a  pressure ulcer but if it is it's a stg 2,  partial thickness, low eexudate  Wound supplies : 5 each Comfeel? Plus [id number] x 2 3/4''  5x7 cm  [facility]   wound care orders: cleans wound, dry thoroughly  and replace Comfeel. Leave in place for 7 days for 5 weeks    Wound, open, buttock, right, initial encounter       * order for DME     1 Box    Equipment being ordered: wound care supplies CONVATEC DUODERM  Extra Thin Dressing - 1 1/4\" x 1 1/2\", Spots, oval [id number] Wound measurements 3 cm x 3 cm x 0.1 cm lesions with wound area of 1.5 x 1.5 that has slight drainage, full thickness located on the right sacral region    Benign neoplasm of skin of trunk, except scrotum       oxyCODONE IR 10 MG tablet    ROXICODONE    40 tablet    Take 1 tablet (10 mg) by mouth every 6 hours as needed for moderate to severe pain    Post laminectomy syndrome       promethazine 25 MG tablet    PHENERGAN    40 tablet    TAKE 1 TABLET (25 MG) BY MOUTH EVERY 6 HOURS AS NEEDED FOR NAUSEA    Nausea without vomiting       propranolol 20 MG tablet    INDERAL    90 tablet    TAKE 1 TABLET (20 MG) " BY MOUTH 3 TIMES DAILY    Esophageal varices in cirrhosis (H)       sertraline 100 MG tablet    ZOLOFT    180 tablet    Take 2 tablets (200 mg) by mouth daily    Generalized anxiety disorder       Syringe (Disposable) 25 ML Misc     1 each    Syringe to be used for nasal irrigation    Chronic maxillary sinusitis       * Notice:  This list has 7 medication(s) that are the same as other medications prescribed for you. Read the directions carefully, and ask your doctor or other care provider to review them with you.

## 2018-07-06 NOTE — LETTER
7/6/2018       RE: Erwin Aguilar  [address]  [place] [address]     Dear Colleague,    Thank you for referring your patient, Erwin Aguilar, to the [facility] at [facility]. Please see a copy of my visit note below.    Subjective:    57 year old male presents for 7-day post-operative visit.    He underwent SPINAL CORD STIMULATOR EXPLANT AND RE-IMPLANTATION ([employer]) on 6/28/18.    His GABRIELA drain was pulled by myself on Monday July 2, while he was inpatient at [facility].    Past Medical History:   Diagnosis Date     Actinic keratosis     aldara     Anxiety      Cancer (H)     squamous cell skin CA     Cauda equina spinal cord injury (H)      Chronic sinusitis 5-1-16     Diastasis recti      Esophageal reflux      Esophageal varices in cirrhosis (H) 4/1/2014    Hospitalized for UGI blee 3/28/14, endoscopy revealed bleeding varices.     Essential hypertension, benign      Intermittent asthma      Mild depression (H)      Mixed hyperlipidemia      Nasal polyps 5-1-16     Other chronic pain      SCCA (squamous cell carcinoma) of skin      Seasonal allergic conjunctivitis      Type II or unspecified type diabetes mellitus without mention of complication, not stated as uncontrolled      Unspecified site of spinal cord injury without evidence of spinal bone injury     due to back surgery    past medical history reviewed with patient.   Past Surgical History:   Procedure Laterality Date     BACK SURGERY  August 2009     C APPENDECTOMY  1974     COLONOSCOPY N/A 5/12/2016    Procedure: COMBINED COLONOSCOPY, SINGLE OR MULTIPLE BIOPSY/POLYPECTOMY BY BIOPSY;  Surgeon: Ana Paula Urbina MD;  [facility]: [facility]     ENDOSCOPY UPPER, COLONOSCOPY, COMBINED  10/19/2011    Procedure:COMBINED ENDOSCOPY UPPER, COLONOSCOPY; Upper Endoscopy, Colonoscopy with Polypectomy x4; Surgeon:AMBAR RODRÍGUEZ; Location:[facility] OR     ENT  SURGERY  1-2016    Ongoing since then     ESOPHAGOSCOPY, GASTROSCOPY, DUODENOSCOPY (EGD), COMBINED  3/28/2014    Procedure: COMBINED ESOPHAGOSCOPY, GASTROSCOPY, DUODENOSCOPY (EGD);  EGD, Gastric Biopsies, Esophageal Banding;  Surgeon: Reyna Tovar MD;  Location: [facility] OR     ESOPHAGOSCOPY, GASTROSCOPY, DUODENOSCOPY (EGD), COMBINED  6/9/2014    Procedure: COMBINED ESOPHAGOSCOPY, GASTROSCOPY, DUODENOSCOPY (EGD);  Surgeon: Curtis Mendez MD;  Location: [facility]     ESOPHAGOSCOPY, GASTROSCOPY, DUODENOSCOPY (EGD), COMBINED  7/24/2014    Procedure: COMBINED ESOPHAGOSCOPY, GASTROSCOPY, DUODENOSCOPY (EGD);  Surgeon: Gerard Samaniego MD;  [facility]     ESOPHAGOSCOPY, GASTROSCOPY, DUODENOSCOPY (EGD), COMBINED N/A 10/31/2014    Procedure: COMBINED ESOPHAGOSCOPY, GASTROSCOPY, DUODENOSCOPY (EGD);  Surgeon: Gerard Samaniego MD;  [facility]     ESOPHAGOSCOPY, GASTROSCOPY, DUODENOSCOPY (EGD), COMBINED N/A 5/12/2016    Procedure: COMBINED ESOPHAGOSCOPY, GASTROSCOPY, DUODENOSCOPY (EGD);  Surgeon: Ana Paula Urbina MD;  Location: [facility]     HCL SQUAMOUS CELL CARCINOMA AG  10/07    left forearm     HERNIORRHAPHY UMBILICAL  11/8/2012    Procedure: HERNIORRHAPHY UMBILICAL;  Open Umbilical Hernia Repair With Mesh ;  Surgeon: Chase Nicholson MD;  Location: [facility] OR     INSERT STIMULATOR DORSAL COLUMN N/A 6/28/2018    Procedure: INSERT STIMULATOR DORSAL COLUMN;;  Surgeon: Elvis Sauceda MD;  [facility]     neuro stimulator  2010     REMOVE GENERATOR STIMULATOR (LOCATION) N/A 6/28/2018    Procedure: REMOVE GENERATOR STIMULATOR (LOCATION);  Spinal Cord Stimulator and IPG Explant and Re-Implant of SCS System (Leads and IPG);  Surgeon: Elvis Sauceda MD;  Location[facility]     SURGICAL HISTORY OF -   1/02    abcess tooth     SURGICAL HISTORY OF -   1999    L4-5 laminectomy, cauda equina syndrome     SURGICAL HISTORY OF -   +    herniated disk repair     TONSILLECTOMY   10 1964     TRANSPOSITION ULNAR NERVE (ELBOW)      right    past surgical history reviewed with patient.     Medications:    Current Outpatient Prescriptions   Medication     ACE/ARB NOT PRESCRIBED, INTENTIONAL,     albuterol (PROAIR HFA, PROVENTIL HFA, VENTOLIN HFA) 108 (90 BASE) MCG/ACT inhaler     Alcohol Swabs (ALCOHOL PADS) 70 % PADS     ammonium lactate (LAC-HYDRIN) 12 % cream     baclofen (LIORESAL) 10 MG tablet     BASAGLAR 100 UNIT/ML injection     BD ROSE U/F 32G X 4 MM insulin pen needle     blood glucose monitoring (NO BRAND SPECIFIED) test strip     blood glucose monitoring (NO BRAND SPECIFIED) test strip     blood glucose monitoring (SOFTCLIX) lancets     Blood Glucose Monitoring Suppl (ACURA BLOOD GLUCOSE METER) W/DEVICE KIT     cephALEXin (KEFLEX) 500 MG capsule     clindamycin (CLEOCIN) 300 MG capsule     COMPRESSION STOCKINGS     hydrochlorothiazide (HYDRODIURIL) 25 MG tablet     metoclopramide (REGLAN) 10 MG tablet     mupirocin (BACTROBAN) 2 % ointment     omeprazole (PRILOSEC) 40 MG capsule     ondansetron (ZOFRAN) 4 MG tablet     ondansetron (ZOFRAN-ODT) 4 MG ODT tab     order for DME     order for DME     order for DME     ORDER FOR DME     oxyCODONE IR (ROXICODONE) 10 MG tablet     promethazine (PHENERGAN) 25 MG tablet     propranolol (INDERAL) 20 MG tablet     sertraline (ZOLOFT) 100 MG tablet     Syringe, Disposable, 25 ML MISC     No current facility-administered medications for this visit.        [place] and [place] Prescription Monitoring Program reviewed    Allergies:     Allergies   Allergen Reactions     Lisinopril Anaphylaxis     Swollen tongue; inability to swallow; drooling; hives; swollen face, neck, angioedema     Acetaminophen      Hx of cirrhosis      Amlodipine      Swelling, hives, possible angioedema       Keflex [Cephalexin]      Patient reports that he has taken augmentin and tolerated in in 2/2017  ( Pt was on Keflex in June 2018- no allergic symptom experienced)      Morphine       b/p dropped and arms went numb  Hypotension     Quinolones Hives     Bactrim [Sulfamethoxazole W/Trimethoprim] Rash     Levaquin Swelling and Rash     Swelling in lip and tongue felt thick       Family History:  family history includes Breast Cancer in his mother; Cancer in his father and mother; Diabetes in his brother, brother, and brother; Other Cancer in his mother. There is no history of Cancer - colorectal.    Review Of Systems    14 point ROS negative except per HPI    Objective:     There were no vitals taken for this visit.  There is no height or weight on file to calculate BMI.    General: In no apparent distress  Mental status: Normal affect, pleasant  Head: Atraumatic, normocephalic  Eyes: Extra-ocular movements grossly intact, no scleral icterus  Cardiovascular: RRR  Respiratory: CTAB  Abdomen: soft, non-distended  Msk: Bilateral hips, knees have normal range of motion. Pain with extension and rotation of lumbar spine  Neuro: AAOx3.   CN II-XII are grossly intact.   Back: well healed surgical incisions, dressings clean, dry, intact, GABRILEA site partially epithelialized  Skin: No rashes or lesions noted on exposed areas of skin  Lymph: no supraclavicular lymphadenopathy      Chaperone present during the entire Physical Exam: None    Imaging: Reviewed    Assessment:    Erwin is a 57-year-old male who presents to the pain clinic as a follow-up patient after his spinal cord stimulator explant and reimplantation surgery with [employer]. He underwent the surgery on June 28 and his GABRIELA drain was pulled on July 2, 2018, while he was an inpatient at the [facility]. At today's visit, he reports that he is feeling well and that his previous testicular pain and nerve pain has subsided. I do not have a definitive explanation for this, however, it may be due to the fact that the previous damage leads have been removed from his spine. At today's visit, the wounds are partially epithelialized and I removed the  sutures and examined the GABRIELA site which was closed with Dermabond on July 2, 2018. The device will be turned on at today's visit by the [employer] representative.    Plan:     1. RTC in 3 weeks for 1-month post-op visit with [employer] Representative present  2. Continue PO antibiotics for another week        Again, thank you for allowing me to participate in the care of your patient.      Sincerely,    Elvis Sauceda MD

## 2018-07-06 NOTE — PATIENT INSTRUCTIONS
1. Spinal cord stimulator turned on today.      Follow up: 4 week post op follow up with VANCE Benz 7/27/18 at 8am.       To speak with a nurse, schedule/reschedule/cancel a clinic appointment, or request a medication refill call: [phone]     You can also reach us by [employer]: [email or web address]    For refills, please call on Monday, 1 week before your medication runs out. The doctors are not always in clinic, so this gives us time to get your prescriptions ready.  Please let us know the name of the medication you are requesting a refill of.

## 2018-07-09 ENCOUNTER — DOCUMENTATION ONLY (OUTPATIENT)
Dept: PHARMACY | Facility: CLINIC | Age: 58
End: 2018-07-09

## 2018-07-09 NOTE — PROGRESS NOTES
Prior Authorization Approval    ondansetron 4 mg tabs  Date Initiated: 07/09/2018  Date Completed: 07/09/2018  Prior Auth Type: B vs D     Status: Approved    Effective Date: 04/10/2018 - 07/09/2019  Copay: 2.19  [place] Filled: Yes    Insurance: [employer]  Ph: [phone]  ID: [id number]  Case Number: [id number]  Submitted Via: Jay Sarkar  Pharmacy Liaison  Ph: [phone] Page: [phone]

## 2018-07-10 ENCOUNTER — TELEPHONE (OUTPATIENT)
Dept: GASTROENTEROLOGY | Facility: CLINIC | Age: 58
End: 2018-07-10

## 2018-07-10 NOTE — TELEPHONE ENCOUNTER
Patient contacted and reminded of upcoming appointment.  Patient confirmed they will be attending.  Patient instructed to bring updated medications list to appointment.  Joshua Armenta CMA

## 2018-07-10 NOTE — TELEPHONE ENCOUNTER
(Pt has been seen in pain clinic since discharge from hosp)    ED / Discharge Outreach Protocol    Patient Contact    Attempt # 2    Was call answered?  No.  Left message on voicemail with information to call me back.    Ailin Galeano RN, BSN

## 2018-07-11 ENCOUNTER — OFFICE VISIT (OUTPATIENT)
Dept: GASTROENTEROLOGY | Facility: CLINIC | Age: 58
End: 2018-07-11
Attending: INTERNAL MEDICINE
Payer: MEDICARE

## 2018-07-11 ENCOUNTER — HOSPITAL ENCOUNTER (OUTPATIENT)
Facility: AMBULATORY SURGERY CENTER | Age: 58
End: 2018-07-11
Attending: INTERNAL MEDICINE
Payer: MEDICARE

## 2018-07-11 VITALS
TEMPERATURE: 98.4 F | HEIGHT: 71 IN | OXYGEN SATURATION: 100 % | WEIGHT: 223.6 LBS | DIASTOLIC BLOOD PRESSURE: 88 MMHG | BODY MASS INDEX: 31.3 KG/M2 | HEART RATE: 60 BPM | SYSTOLIC BLOOD PRESSURE: 157 MMHG

## 2018-07-11 DIAGNOSIS — K70.30 ALCOHOLIC CIRRHOSIS OF LIVER WITHOUT ASCITES (H): ICD-10-CM

## 2018-07-11 DIAGNOSIS — K92.0 GASTROINTESTINAL HEMORRHAGE WITH HEMATEMESIS: ICD-10-CM

## 2018-07-11 DIAGNOSIS — K70.30 ALCOHOLIC CIRRHOSIS OF LIVER WITHOUT ASCITES (H): Primary | ICD-10-CM

## 2018-07-11 LAB
ALBUMIN SERPL-MCNC: 3.5 G/DL (ref 3.4–5)
ALP SERPL-CCNC: 103 U/L (ref 40–150)
ALT SERPL W P-5'-P-CCNC: 19 U/L (ref 0–70)
ANION GAP SERPL CALCULATED.3IONS-SCNC: 8 MMOL/L (ref 3–14)
AST SERPL W P-5'-P-CCNC: 18 U/L (ref 0–45)
BILIRUB DIRECT SERPL-MCNC: <0.1 MG/DL (ref 0–0.2)
BILIRUB SERPL-MCNC: 0.3 MG/DL (ref 0.2–1.3)
BUN SERPL-MCNC: 10 MG/DL (ref 7–30)
CALCIUM SERPL-MCNC: 8.5 MG/DL (ref 8.5–10.1)
CHLORIDE SERPL-SCNC: 106 MMOL/L (ref 94–109)
CO2 SERPL-SCNC: 26 MMOL/L (ref 20–32)
CREAT SERPL-MCNC: 0.65 MG/DL (ref 0.66–1.25)
ERYTHROCYTE [DISTWIDTH] IN BLOOD BY AUTOMATED COUNT: 15 % (ref 10–15)
GFR SERPL CREATININE-BSD FRML MDRD: >90 ML/MIN/1.7M2
GLUCOSE SERPL-MCNC: 126 MG/DL (ref 70–99)
HCT VFR BLD AUTO: 34 % (ref 40–53)
HGB BLD-MCNC: 11.1 G/DL (ref 13.3–17.7)
INR PPP: 1.03 (ref 0.86–1.14)
MCH RBC QN AUTO: 26.9 PG (ref 26.5–33)
MCHC RBC AUTO-ENTMCNC: 32.6 G/DL (ref 31.5–36.5)
MCV RBC AUTO: 83 FL (ref 78–100)
PLATELET # BLD AUTO: 188 10E9/L (ref 150–450)
POTASSIUM SERPL-SCNC: 3.6 MMOL/L (ref 3.4–5.3)
PROT SERPL-MCNC: 7.2 G/DL (ref 6.8–8.8)
RBC # BLD AUTO: 4.12 10E12/L (ref 4.4–5.9)
SODIUM SERPL-SCNC: 140 MMOL/L (ref 133–144)
WBC # BLD AUTO: 6.4 10E9/L (ref 4–11)

## 2018-07-11 PROCEDURE — G0463 HOSPITAL OUTPT CLINIC VISIT: HCPCS | Mod: ZF

## 2018-07-11 PROCEDURE — 36415 COLL VENOUS BLD VENIPUNCTURE: CPT | Performed by: INTERNAL MEDICINE

## 2018-07-11 PROCEDURE — 80048 BASIC METABOLIC PNL TOTAL CA: CPT | Performed by: INTERNAL MEDICINE

## 2018-07-11 PROCEDURE — 85610 PROTHROMBIN TIME: CPT | Performed by: INTERNAL MEDICINE

## 2018-07-11 PROCEDURE — 80076 HEPATIC FUNCTION PANEL: CPT | Performed by: INTERNAL MEDICINE

## 2018-07-11 PROCEDURE — 85027 COMPLETE CBC AUTOMATED: CPT | Performed by: INTERNAL MEDICINE

## 2018-07-11 NOTE — NURSING NOTE
Chief Complaint   Patient presents with     RECHECK     Alcoholic cirrhosis of liver without ascites    Pt roomed, vitals, meds, and allergies reviewed with pt. Pt ready for provider.  Joshua Armenta, CMA

## 2018-07-11 NOTE — LETTER
7/11/2018      RE: Erwin Aguilar  [address]  [place] [address]       [facility]    Hepatology follow-up    HISTORY OF PRESENT ILLNESS:  Mr. Aguilar is a 57-year-old  male we have seen and followed here for alcoholic cirrhosis.  When we initially saw him, he had presented to the emergency room with hematemesis.  He did get an EGD and was found to have esophageal varices that were banded.  Mr. Aguilar also has problems with cauda equinus syndrome and had back surgeries where the patient had a spinal cord stimulator explanted and then reimplanted with another stimulator from [employer] and the surgery was done on 06/28/2018.  He did have antibiotics the week prior and the week after, and he is supposed to finish within the coming 4 days.  After he started the antibiotic,  he developed  nausea and vomiting twice and the second one had blood in it and there was some drop in his hemoglobin.  He did get admitted and was monitored and it was thought that it could be a Colleen-Penn tear  as he did not have any further bleeding and no melena was identified, so he did not have EGD as in-patient.      Today, he denies any nausea or vomiting.  His native MELD sodium is 6.  He denied also Patient  jaundice, lower extremity edema, abdominal distension, lethargy or confusion. Denies melena, hematemesis or hematochezia.  He has no fevers, sweats or chills and his wound is healing.     Medical hx Surgical hx   Past Medical History:   Diagnosis Date     Actinic keratosis      Anxiety      Cancer (H)      Cauda equina spinal cord injury (H)      Chronic sinusitis 5-1-16     Diastasis recti      Esophageal reflux      Esophageal varices in cirrhosis (H) 4/1/2014     Essential hypertension, benign      Intermittent asthma      Mild depression (H)      Mixed hyperlipidemia      Nasal polyps 5-1-16     Other chronic pain      SCCA (squamous cell carcinoma) of skin      Seasonal allergic  conjunctivitis      Type II or unspecified type diabetes mellitus without mention of complication, not stated as uncontrolled      Unspecified site of spinal cord injury without evidence of spinal bone injury       Past Surgical History:   Procedure Laterality Date     BACK SURGERY  August 2009     C APPENDECTOMY  1974     COLONOSCOPY N/A 5/12/2016    Procedure: COMBINED COLONOSCOPY, SINGLE OR MULTIPLE BIOPSY/POLYPECTOMY BY BIOPSY;  Surgeon: Ana Paula Urbina MD;  Location:  GI     ENDOSCOPY UPPER, COLONOSCOPY, COMBINED  10/19/2011    Procedure:COMBINED ENDOSCOPY UPPER, COLONOSCOPY; Upper Endoscopy, Colonoscopy with Polypectomy x4; Surgeon:AMBAR RODRÍGUEZIQ; Location: OR     ENT SURGERY  1-2016    Ongoing since then     ESOPHAGOSCOPY, GASTROSCOPY, DUODENOSCOPY (EGD), COMBINED  3/28/2014    Procedure: COMBINED ESOPHAGOSCOPY, GASTROSCOPY, DUODENOSCOPY (EGD);  EGD, Gastric Biopsies, Esophageal Banding;  Surgeon: Reyna Tovar MD;  Location:  OR     ESOPHAGOSCOPY, GASTROSCOPY, DUODENOSCOPY (EGD), COMBINED  6/9/2014    Procedure: COMBINED ESOPHAGOSCOPY, GASTROSCOPY, DUODENOSCOPY (EGD);  Surgeon: Curtis Mendez MD;  Location: [facility]     ESOPHAGOSCOPY, GASTROSCOPY, DUODENOSCOPY (EGD), COMBINED  7/24/2014    Procedure: COMBINED ESOPHAGOSCOPY, GASTROSCOPY, DUODENOSCOPY (EGD);  Surgeon: Gerard Samaniego MD;  Location:  OR     ESOPHAGOSCOPY, GASTROSCOPY, DUODENOSCOPY (EGD), COMBINED N/A 10/31/2014    Procedure: COMBINED ESOPHAGOSCOPY, GASTROSCOPY, DUODENOSCOPY (EGD);  Surgeon: Gerard Samaniego MD;  Location:  OR     ESOPHAGOSCOPY, GASTROSCOPY, DUODENOSCOPY (EGD), COMBINED N/A 5/12/2016    Procedure: COMBINED ESOPHAGOSCOPY, GASTROSCOPY, DUODENOSCOPY (EGD);  Surgeon: Ana Paula Urbina MD;  Location:  GI     HCL SQUAMOUS CELL CARCINOMA AG  10/07    left forearm     HERNIORRHAPHY UMBILICAL  11/8/2012    Procedure: HERNIORRHAPHY UMBILICAL;  Open Umbilical Hernia Repair With Mesh ;   Surgeon: Chase Nicholson MD;  Location: [facility] OR     INSERT STIMULATOR DORSAL COLUMN N/A 6/28/2018    Procedure: INSERT STIMULATOR DORSAL COLUMN;;  Surgeon: Elvis Sauceda MD;  Location: [facility] OR     neuro stimulator  2010     REMOVE GENERATOR STIMULATOR (LOCATION) N/A 6/28/2018    Procedure: REMOVE GENERATOR STIMULATOR (LOCATION);  Spinal Cord Stimulator and IPG Explant and Re-Implant of SCS System (Leads and IPG);  Surgeon: Elvis Sauceda MD;  Location:  OR     SURGICAL HISTORY OF -   1/02    abcess tooth     SURGICAL HISTORY OF -   1999    L4-5 laminectomy, cauda equina syndrome     SURGICAL HISTORY OF -   +    herniated disk repair     TONSILLECTOMY  10 1964     TRANSPOSITION ULNAR NERVE (ELBOW)      right          Medications  Prior to Admission medications    Medication Sig Start Date End Date Taking? Authorizing Provider   ACE/ARB NOT PRESCRIBED, INTENTIONAL, ACE & ARB not prescribed due to Allergy 8/2/12  Yes Ksenia Bean APRN CNP   albuterol (PROAIR HFA, PROVENTIL HFA, VENTOLIN HFA) 108 (90 BASE) MCG/ACT inhaler Inhale 2 puffs into the lungs every 6 hours as needed for shortness of breath / dyspnea or wheezing Ventolin or other covered brand  Patient taking differently: Inhale 2 puffs into the lungs as needed for shortness of breath / dyspnea or wheezing Ventolin or other covered brand 9/28/16  Yes Wegener, Joel Daniel Irwin, MD   Alcohol Swabs (ALCOHOL PADS) 70 % PADS 1 Box 4 times daily 3/1/16  Yes Wegener, Joel Daniel Irwin, MD   ammonium lactate (LAC-HYDRIN) 12 % cream Apply topically 2 times daily as needed for dry skin 11/1/17  Yes Victor M Anaya DPM   baclofen (LIORESAL) 10 MG tablet TAKE 2 TABLETS BY MOUTH THREE TIMES DAILY 6/17/18  Yes Wegener, Joel Daniel Irwin, MD   BASAGLAR 100 UNIT/ML injection Inject 26 Units Subcutaneous daily 12/13/17  Yes Wegener, Joel Daniel Irwin, MD   BD ROSE U/F 32G X 4 MM insulin pen needle USE ONCE DAILY  "AS DIRECTED 1/11/18  Yes Wegener, Joel Daniel Irwin, MD   blood glucose monitoring (NO BRAND SPECIFIED) test strip Use to test blood sugars 1 time daily. ACCU-CHEK KARISHMA BRAND PLEASE. 2/12/18  Yes Wegener, Joel Daniel Irwin, MD   blood glucose monitoring (NO BRAND SPECIFIED) test strip Use to test blood sugars one time daily or as directed    Accucheck Richards plus test strips 10/6/16  Yes Wegener, Joel Daniel Irwin, MD   blood glucose monitoring (SOFTCLIX) lancets Use to test blood sugar 1 time daily or as directed. 2/22/18  Yes Wegener, Joel Daniel Irwin, MD   Blood Glucose Monitoring Suppl (ACURA BLOOD GLUCOSE METER) W/DEVICE KIT 1 Dose 2 times daily 4/14/14  Yes Estrella Bowman NP   cephALEXin (KEFLEX) 500 MG capsule Take 1 capsule (500 mg) by mouth 4 times daily 6/26/18  Yes Elvis Sauceda MD   clindamycin (CLEOCIN) 300 MG capsule Take 1 capsule (300 mg) by mouth 4 times daily 6/26/18  Yes Elvis Sauceda MD   COMPRESSION STOCKINGS Knee high compression socks 30-40-mm 11/6/15  Yes Wegener, Joel Daniel Irwin, MD   hydrochlorothiazide (HYDRODIURIL) 25 MG tablet TAKE 1 TABLET (25 MG) BY MOUTH DAILY  Patient taking differently: AM 3/23/18  Yes Ksenia Drummond MD   mupirocin (BACTROBAN) 2 % ointment Apply to anterior nares BID X 2 month supply  Patient taking differently: Apply topically as needed Apply to anterior nares BID X 2 month supply 2/16/17  Yes Antonio Chávez MD   omeprazole (PRILOSEC) 40 MG capsule Take 1 capsule (40 mg) by mouth daily Take 30-60 minutes before a meal.  Patient taking differently: Take 40 mg by mouth daily as needed Take 30-60 minutes before a meal. 5/19/16  Yes Wegener, Joel Daniel Irwin, MD   order for DME Equipment being ordered: wound care supplies  CONVATEC DUODERM  Extra Thin Dressing - 1 1/4\" x 1 1/2\", Spots, oval  [id number]  Wound measurements  3 cm x 3 cm x 0.1 cm lesions with wound area of 1.5 x 1.5 that has slight " drainage, full thickness located on the right sacral region 1/12/18  Yes Delilah Molina MD   order for DME   Location:sacral ulcer unknown etiology possible sheer    measurement: 2.5 cm x 2 cm x 0.1 cm Unlikely to be a  pressure ulcer but if it is it's a stg 2,  partial thickness, low eexudate    Wound supplies :  5 each Comfeel  Plus Transparent 3530 2 x 2 3/4''  5x7 cm  [facility]     wound care orders: cleans wound, dry thoroughly  and replace Comfeel. Leave in place for 7 days for 5 weeks 8/17/17  Yes Delilah Molina MD   order for DME Equipment being ordered: one donut for sitting 12/19/16  Yes Wegener, Joel Daniel Irwin, MD   ORDER FOR DME Equipment being ordered: BP cuff 1/13/14  Yes Estrella Bowman NP   oxyCODONE IR (ROXICODONE) 10 MG tablet Take 1 tablet (10 mg) by mouth every 6 hours as needed for moderate to severe pain 6/28/18  Yes Elvis Sauceda MD   promethazine (PHENERGAN) 25 MG tablet TAKE 1 TABLET (25 MG) BY MOUTH EVERY 6 HOURS AS NEEDED FOR NAUSEA 7/6/18  Yes Ksenia Drummond MD   propranolol (INDERAL) 20 MG tablet TAKE 1 TABLET (20 MG) BY MOUTH 3 TIMES DAILY 6/1/18  Yes Wegener, Joel Daniel Irwin, MD   sertraline (ZOLOFT) 100 MG tablet Take 2 tablets (200 mg) by mouth daily  Patient taking differently: Take 200 mg by mouth every morning  3/28/17  Yes Wegener, Joel Daniel Irwin, MD   Syringe, Disposable, 25 ML MISC Syringe to be used for nasal irrigation 8/19/16  Yes Antonio Chávez MD       Allergies  Allergies   Allergen Reactions     Lisinopril Anaphylaxis     Swollen tongue; inability to swallow; drooling; hives; swollen face, neck, angioedema     Acetaminophen      Hx of cirrhosis      Amlodipine      Swelling, hives, possible angioedema       Keflex [Cephalexin]      Patient reports that he has taken augmentin and tolerated in in 2/2017  ( Pt was on Keflex in June 2018- no allergic symptom experienced)      Morphine      b/p dropped and  "arms went numb  Hypotension     Quinolones Hives     Bactrim [Sulfamethoxazole W/Trimethoprim] Rash     Levaquin Swelling and Rash     Swelling in lip and tongue felt thick       Review of systems  A 10-point review of systems was negative    Examination  /88  Pulse 60  Temp 98.4  F (36.9  C) (Oral)  Ht 1.803 m (5' 11\")  Wt 101.4 kg (223 lb 9.6 oz)  SpO2 100%  BMI 31.19 kg/m2  Body mass index is 31.19 kg/(m^2).    Gen- well, NAD, A+Ox3, normal color  Lym- no palpable LAD  CVS- RRR  RS- CTA  Abd-   Extr- hands normal, no EVA  Skin- no rash or jaundice  Neuro- no asterixis  Psych- normal mood    Laboratory  Lab Results   Component Value Date     07/11/2018    POTASSIUM 3.6 07/11/2018    CHLORIDE 106 07/11/2018    CO2 26 07/11/2018    BUN 10 07/11/2018    CR 0.65 07/11/2018       Lab Results   Component Value Date    BILITOTAL 0.3 07/11/2018    ALT 19 07/11/2018    AST 18 07/11/2018    ALKPHOS 103 07/11/2018       Lab Results   Component Value Date    ALBUMIN 3.5 07/11/2018    PROTTOTAL 7.2 07/11/2018        Lab Results   Component Value Date    WBC 6.4 07/11/2018    HGB 11.1 07/11/2018    MCV 83 07/11/2018     07/11/2018       Lab Results   Component Value Date    INR 1.03 07/11/2018       Radiology    ASSESSMENT AND PLAN:  Mr. Aguilar is a 57-year-old  male with cauda equina and chronic pain syndrome.  He did have, historically when we saw him, cirrhosis of the liver.  He abstained from alcohol for quite a long time now and he did very well.  He does not show any signs of decompensation.  He previously had fluid retention and also he had esophageal varices and variceal bleed.  He did get banding.  Since then, he did really well.  His native MELD sodium is now 6.  We did the following plan with him since he had that episode of bleeding which could have been Colleen-Penn  tear.  We might need to repeat the upper endoscopy soon.  He will have also his ultrasound repeated in September.  " Please note that his March ultrasound did not show any liver lesions.  It showed cirrhosis with portal hypertension including the splenomegaly.  Anyway that will be ordered and he will do it in October.  Otherwise, he will be seen here in 6 months.  We did explain that to the patient and he is comfortable with that.  For his recent reimplantation of stimulator, he will [facility] with [facility] and he will be also following with his primary care physician.  He will be seen here in 6 months.       This was a 25 min  visit, which more than 50% was spent in explaining to the patient what our plan of care was.  We answered all his questions.      Kimberly Ramirez MD  Hepatology  [facility]    cc:     Joel Wegener, MD   [address] [address]

## 2018-07-11 NOTE — PROGRESS NOTES
[facility]    Hepatology follow-up    HISTORY OF PRESENT ILLNESS:  Mr. Aguilar is a 57-year-old  male we have seen and followed here for alcoholic cirrhosis.  When we initially saw him, he had presented to the emergency room with hematemesis.  He did get an EGD and was found to have esophageal varices that were banded.  Mr. Aguilar also has problems with cauda equinus syndrome and had back surgeries where the patient had a spinal cord stimulator explanted and then reimplanted with another stimulator from [employer] and the surgery was done on 06/28/2018.  He did have antibiotics the week prior and the week after, and he is supposed to finish within the coming 4 days.  After he started the antibiotic,  he developed  nausea and vomiting twice and the second one had blood in it and there was some drop in his hemoglobin.  He did get admitted and was monitored and it was thought that it could be a Colleen-Penn tear  as he did not have any further bleeding and no melena was identified, so he did not have EGD as in-patient.      Today, he denies any nausea or vomiting.  His native MELD sodium is 6.  He denied also Patient  jaundice, lower extremity edema, abdominal distension, lethargy or confusion. Denies melena, hematemesis or hematochezia.  He has no fevers, sweats or chills and his wound is healing.     Medical hx Surgical hx   Past Medical History:   Diagnosis Date     Actinic keratosis      Anxiety      Cancer (H)      Cauda equina spinal cord injury (H)      Chronic sinusitis 5-1-16     Diastasis recti      Esophageal reflux      Esophageal varices in cirrhosis (H) 4/1/2014     Essential hypertension, benign      Intermittent asthma      Mild depression (H)      Mixed hyperlipidemia      Nasal polyps 5-1-16     Other chronic pain      SCCA (squamous cell carcinoma) of skin      Seasonal allergic conjunctivitis      Type II or unspecified type diabetes mellitus without mention of  complication, not stated as uncontrolled      Unspecified site of spinal cord injury without evidence of spinal bone injury       Past Surgical History:   Procedure Laterality Date     BACK SURGERY  August 2009     C APPENDECTOMY  1974     COLONOSCOPY N/A 5/12/2016    Procedure: COMBINED COLONOSCOPY, SINGLE OR MULTIPLE BIOPSY/POLYPECTOMY BY BIOPSY;  Surgeon: Ana Paula Urbina MD;  Location:  GI     ENDOSCOPY UPPER, COLONOSCOPY, COMBINED  10/19/2011    Procedure:COMBINED ENDOSCOPY UPPER, COLONOSCOPY; Upper Endoscopy, Colonoscopy with Polypectomy x4; Surgeon:AMBAR RODRÍGUEZ; Location: OR     ENT SURGERY  1-2016    Ongoing since then     ESOPHAGOSCOPY, GASTROSCOPY, DUODENOSCOPY (EGD), COMBINED  3/28/2014    Procedure: COMBINED ESOPHAGOSCOPY, GASTROSCOPY, DUODENOSCOPY (EGD);  EGD, Gastric Biopsies, Esophageal Banding;  Surgeon: Reyna Tovar MD;  Location:  OR     ESOPHAGOSCOPY, GASTROSCOPY, DUODENOSCOPY (EGD), COMBINED  6/9/2014    Procedure: COMBINED ESOPHAGOSCOPY, GASTROSCOPY, DUODENOSCOPY (EGD);  Surgeon: Curtis Mendez MD;  Location:  GI     ESOPHAGOSCOPY, GASTROSCOPY, DUODENOSCOPY (EGD), COMBINED  7/24/2014    Procedure: COMBINED ESOPHAGOSCOPY, GASTROSCOPY, DUODENOSCOPY (EGD);  Surgeon: Gerard Samaniego MD;  Location:  OR     ESOPHAGOSCOPY, GASTROSCOPY, DUODENOSCOPY (EGD), COMBINED N/A 10/31/2014    Procedure: COMBINED ESOPHAGOSCOPY, GASTROSCOPY, DUODENOSCOPY (EGD);  Surgeon: Gerard Samaniego MD;  Location:  OR     ESOPHAGOSCOPY, GASTROSCOPY, DUODENOSCOPY (EGD), COMBINED N/A 5/12/2016    Procedure: COMBINED ESOPHAGOSCOPY, GASTROSCOPY, DUODENOSCOPY (EGD);  Surgeon: Ana Paula Urbina MD;  Location:  GI     HCL SQUAMOUS CELL CARCINOMA AG  10/07    left forearm     HERNIORRHAPHY UMBILICAL  11/8/2012    Procedure: HERNIORRHAPHY UMBILICAL;  Open Umbilical Hernia Repair With Mesh ;  Surgeon: Chase Nicholson MD;  Location: [facility] OR     INSERT STIMULATOR DORSAL  COLUMN N/A 6/28/2018    Procedure: INSERT STIMULATOR DORSAL COLUMN;;  Surgeon: Elvis Sauceda MD;  Location: [facility] OR     neuro stimulator  2010     REMOVE GENERATOR STIMULATOR (LOCATION) N/A 6/28/2018    Procedure: REMOVE GENERATOR STIMULATOR (LOCATION);  Spinal Cord Stimulator and IPG Explant and Re-Implant of SCS System (Leads and IPG);  Surgeon: Elvis Sauceda MD;  Location:  OR     SURGICAL HISTORY OF -   1/02    abcess tooth     SURGICAL HISTORY OF -   1999    L4-5 laminectomy, cauda equina syndrome     SURGICAL HISTORY OF -   +    herniated disk repair     TONSILLECTOMY  10 1964     TRANSPOSITION ULNAR NERVE (ELBOW)      right          Medications  Prior to Admission medications    Medication Sig Start Date End Date Taking? Authorizing Provider   ACE/ARB NOT PRESCRIBED, INTENTIONAL, ACE & ARB not prescribed due to Allergy 8/2/12  Yes Ksenia Bean APRN CNP   albuterol (PROAIR HFA, PROVENTIL HFA, VENTOLIN HFA) 108 (90 BASE) MCG/ACT inhaler Inhale 2 puffs into the lungs every 6 hours as needed for shortness of breath / dyspnea or wheezing Ventolin or other covered brand  Patient taking differently: Inhale 2 puffs into the lungs as needed for shortness of breath / dyspnea or wheezing Ventolin or other covered brand 9/28/16  Yes Wegener, Joel Daniel Irwin, MD   Alcohol Swabs (ALCOHOL PADS) 70 % PADS 1 Box 4 times daily 3/1/16  Yes Wegener, Joel Daniel Irwin, MD   ammonium lactate (LAC-HYDRIN) 12 % cream Apply topically 2 times daily as needed for dry skin 11/1/17  Yes Victor M Anaya DPM   baclofen (LIORESAL) 10 MG tablet TAKE 2 TABLETS BY MOUTH THREE TIMES DAILY 6/17/18  Yes Wegener, Joel Daniel Irwin, MD   BASAGLAR 100 UNIT/ML injection Inject 26 Units Subcutaneous daily 12/13/17  Yes Wegener, Joel Daniel Irwin, MD   BD ROSE U/F 32G X 4 MM insulin pen needle USE ONCE DAILY AS DIRECTED 1/11/18  Yes Wegener, Joel Daniel Irwin, MD   blood glucose monitoring  "(NO BRAND SPECIFIED) test strip Use to test blood sugars 1 time daily. ACCU-CHEK KARISHMA BRAND PLEASE. 2/12/18  Yes Wegener, Joel Daniel Irwin, MD   blood glucose monitoring (NO BRAND SPECIFIED) test strip Use to test blood sugars one time daily or as directed    Accucheck Richards plus test strips 10/6/16  Yes Wegener, Joel Daniel Irwin, MD   blood glucose monitoring (SOFTCLIX) lancets Use to test blood sugar 1 time daily or as directed. 2/22/18  Yes Wegener, Joel Daniel Irwin, MD   Blood Glucose Monitoring Suppl (ACURA BLOOD GLUCOSE METER) W/DEVICE KIT 1 Dose 2 times daily 4/14/14  Yes Estrella Bowman NP   cephALEXin (KEFLEX) 500 MG capsule Take 1 capsule (500 mg) by mouth 4 times daily 6/26/18  Yes Elvis Sauceda MD   clindamycin (CLEOCIN) 300 MG capsule Take 1 capsule (300 mg) by mouth 4 times daily 6/26/18  Yes Elvis Sauceda MD   COMPRESSION STOCKINGS Knee high compression socks 30-40-mm 11/6/15  Yes Wegener, Joel Daniel Irwin, MD   hydrochlorothiazide (HYDRODIURIL) 25 MG tablet TAKE 1 TABLET (25 MG) BY MOUTH DAILY  Patient taking differently: AM 3/23/18  Yes Ksenia Drummond MD   mupirocin (BACTROBAN) 2 % ointment Apply to anterior nares BID X 2 month supply  Patient taking differently: Apply topically as needed Apply to anterior nares BID X 2 month supply 2/16/17  Yes Antonio Chávez MD   omeprazole (PRILOSEC) 40 MG capsule Take 1 capsule (40 mg) by mouth daily Take 30-60 minutes before a meal.  Patient taking differently: Take 40 mg by mouth daily as needed Take 30-60 minutes before a meal. 5/19/16  Yes Wegener, Joel Daniel Irwin, MD   order for DME Equipment being ordered: wound care supplies  CONVATEC DUODERM  Extra Thin Dressing - 1 1/4\" x 1 1/2\", Spots, oval  [id number]  Wound measurements  3 cm x 3 cm x 0.1 cm lesions with wound area of 1.5 x 1.5 that has slight drainage, full thickness located on the right sacral region 1/12/18  Yes Delilah Molina " MD Sharonda   order for DME   Location:sacral ulcer unknown etiology possible sheer    measurement: 2.5 cm x 2 cm x 0.1 cm Unlikely to be a  pressure ulcer but if it is it's a stg 2,  partial thickness, low eexudate    Wound supplies :  5 each Comfeel  Plus Transparent 3530 2 x 2 3/4''  5x7 cm  [facility]     wound care orders: cleans wound, dry thoroughly  and replace Comfeel. Leave in place for 7 days for 5 weeks 8/17/17  Yes Delilah Molina MD   order for DME Equipment being ordered: one donut for sitting 12/19/16  Yes Wegener, Joel Daniel Irwin, MD   ORDER FOR DME Equipment being ordered: BP cuff 1/13/14  Yes Estrella Bowman NP   oxyCODONE IR (ROXICODONE) 10 MG tablet Take 1 tablet (10 mg) by mouth every 6 hours as needed for moderate to severe pain 6/28/18  Yes Elvis Sauceda MD   promethazine (PHENERGAN) 25 MG tablet TAKE 1 TABLET (25 MG) BY MOUTH EVERY 6 HOURS AS NEEDED FOR NAUSEA 7/6/18  Yes Ksenia Drummond MD   propranolol (INDERAL) 20 MG tablet TAKE 1 TABLET (20 MG) BY MOUTH 3 TIMES DAILY 6/1/18  Yes Wegener, Joel Daniel Irwin, MD   sertraline (ZOLOFT) 100 MG tablet Take 2 tablets (200 mg) by mouth daily  Patient taking differently: Take 200 mg by mouth every morning  3/28/17  Yes Wegener, Joel Daniel Irwin, MD   Syringe, Disposable, 25 ML MISC Syringe to be used for nasal irrigation 8/19/16  Yes Antonio Chávez MD       Allergies  Allergies   Allergen Reactions     Lisinopril Anaphylaxis     Swollen tongue; inability to swallow; drooling; hives; swollen face, neck, angioedema     Acetaminophen      Hx of cirrhosis      Amlodipine      Swelling, hives, possible angioedema       Keflex [Cephalexin]      Patient reports that he has taken augmentin and tolerated in in 2/2017  ( Pt was on Keflex in June 2018- no allergic symptom experienced)      Morphine      b/p dropped and arms went numb  Hypotension     Quinolones Hives     Bactrim [Sulfamethoxazole  "W/Trimethoprim] Rash     Levaquin Swelling and Rash     Swelling in lip and tongue felt thick       Review of systems  A 10-point review of systems was negative    Examination  /88  Pulse 60  Temp 98.4  F (36.9  C) (Oral)  Ht 1.803 m (5' 11\")  Wt 101.4 kg (223 lb 9.6 oz)  SpO2 100%  BMI 31.19 kg/m2  Body mass index is 31.19 kg/(m^2).    Gen- well, NAD, A+Ox3, normal color  Lym- no palpable LAD  CVS- RRR  RS- CTA  Abd-   Extr- hands normal, no EVA  Skin- no rash or jaundice  Neuro- no asterixis  Psych- normal mood    Laboratory  Lab Results   Component Value Date     07/11/2018    POTASSIUM 3.6 07/11/2018    CHLORIDE 106 07/11/2018    CO2 26 07/11/2018    BUN 10 07/11/2018    CR 0.65 07/11/2018       Lab Results   Component Value Date    BILITOTAL 0.3 07/11/2018    ALT 19 07/11/2018    AST 18 07/11/2018    ALKPHOS 103 07/11/2018       Lab Results   Component Value Date    ALBUMIN 3.5 07/11/2018    PROTTOTAL 7.2 07/11/2018        Lab Results   Component Value Date    WBC 6.4 07/11/2018    HGB 11.1 07/11/2018    MCV 83 07/11/2018     07/11/2018       Lab Results   Component Value Date    INR 1.03 07/11/2018       Radiology    ASSESSMENT AND PLAN:  Mr. Aguilar is a 57-year-old  male with cauda equina and chronic pain syndrome.  He did have, historically when we saw him, cirrhosis of the liver.  He abstained from alcohol for quite a long time now and he did very well.  He does not show any signs of decompensation.  He previously had fluid retention and also he had esophageal varices and variceal bleed.  He did get banding.  Since then, he did really well.  His native MELD sodium is now 6.  We did the following plan with him since he had that episode of bleeding which could have been Colleen-Penn  tear.  We might need to repeat the upper endoscopy soon.  He will have also his ultrasound repeated in September.  Please note that his March ultrasound did not show any liver lesions.  It " showed cirrhosis with portal hypertension including the splenomegaly.  Anyway that will be ordered and he will do it in October.  Otherwise, he will be seen here in 6 months.  We did explain that to the patient and he is comfortable with that.  For his recent reimplantation of stimulator, he will follow with the [facility] and he will be also following with his primary care physician.  He will be seen here in 6 months.       This was a 25 min  visit, which more than 50% was spent in explaining to the patient what our plan of care was.  We answered all his questions.      cc:     Joel Wegener, MD   [facility]   [address] [place] [id number]       Kimberly Ramirez MD  Hepatology  [facility]

## 2018-07-11 NOTE — MR AVS SNAPSHOT
After Visit Summary   7/11/2018    Erwin Aguilar    MRN: [id number]           Patient Information     Date Of Birth          1960        Visit Information        Provider Department      7/11/2018 8:00 AM Kimberly Ramirez MD [facility] Hepatology        Today's Diagnoses     Alcoholic cirrhosis of liver without ascites (H)    -  1    Gastrointestinal hemorrhage with hematemesis           Follow-ups after your visit        Your next 10 appointments already scheduled     Jul 24, 2018  9:20 AM CDT   Office Visit with Joel Daniel Irwin Wegener, MD   [facility]    [address] [place] [id number] [phone]           Bring a current list of meds and any records pertaining to this visit. For Physicals, please bring immunization records and any forms needing to be filled out. Please arrive 10 minutes early to complete paperwork.            Jul 27, 2018  8:00 AM CDT   (Arrive by 7:45 AM)   Return Visit with VANCE Benz [facility] ([facility])    [address] [address] [phone]              Future tests that were ordered for you today     Open Future Orders        Priority Expected Expires Ordered    UPPER GI ENDOSCOPY Routine  10/25/2018 7/11/2018    US abdomen complete Routine  7/11/2019 7/11/2018            Who to contact     If you have questions or need follow up information about today's clinic visit or your schedule please contact [facility] HEPATOLOGY directly at [phone].  Normal or non-critical lab and imaging results will be communicated to you by MyChart, letter or phone within 4 business days after the clinic has received the results. If you do not hear from us within 7 days, please contact the clinic through MyChart or phone. If you have a critical or abnormal lab result, we will notify you by  "phone as soon as possible.  Submit refill requests through [employer] or call your pharmacy and they will forward the refill request to us. Please allow 3 business days for your refill to be completed.          Additional Information About Your Visit        [employer] Information     [employer] gives you secure access to your electronic health record. If you see a primary care provider, you can also send messages to your care team and make appointments. If you have questions, please call your primary care clinic.  If you do not have a primary care provider, please call [phone] and they will assist you.        Care EveryWhere ID     This is your Care EveryWhere ID. This could be used by other organizations to access your [place] medical records  [id number]        Your Vitals Were     Pulse Temperature Height Pulse Oximetry BMI (Body Mass Index)       60 98.4  F (36.9  C) (Oral) 1.803 m (5' 11\") 100% 31.19 kg/m2        Blood Pressure from Last 3 Encounters:   07/11/18 157/88   07/06/18 178/83   07/02/18 168/78    Weight from Last 3 Encounters:   07/11/18 101.4 kg (223 lb 9.6 oz)   06/30/18 103.4 kg (227 lb 14.4 oz)   06/28/18 100.7 kg (222 lb)                 Today's Medication Changes          These changes are accurate as of 7/11/18  8:33 AM.  If you have any questions, ask your nurse or doctor.               These medicines have changed or have updated prescriptions.        Dose/Directions    albuterol 108 (90 Base) MCG/ACT Inhaler   Commonly known as:  PROAIR HFA/PROVENTIL HFA/VENTOLIN HFA   This may have changed:    - when to take this  - additional instructions   Used for:  Moderate persistent asthma without complication        Dose:  2 puff   Inhale 2 puffs into the lungs every 6 hours as needed for shortness of breath / dyspnea or wheezing Ventolin or other covered brand   Quantity:  3 Inhaler   Refills:  3       hydrochlorothiazide 25 MG tablet   Commonly known as:  HYDRODIURIL   This may have changed:  See " the new instructions.   Used for:  Hypertension goal BP (blood pressure) < 140/90        TAKE 1 TABLET (25 MG) BY MOUTH DAILY   Quantity:  90 tablet   Refills:  3       mupirocin 2 % ointment   Commonly known as:  BACTROBAN   This may have changed:    - how to take this  - when to take this  - reasons to take this  - additional instructions   Used for:  Nasal lesion, Nasal vestibulitis        Apply to anterior nares BID X 2 month supply   Quantity:  2 g   Refills:  3       omeprazole 40 MG capsule   Commonly known as:  priLOSEC   This may have changed:    - when to take this  - reasons to take this  - additional instructions   Used for:  Gastroesophageal reflux disease without esophagitis        Dose:  40 mg   Take 1 capsule (40 mg) by mouth daily Take 30-60 minutes before a meal.   Quantity:  90 capsule   Refills:  3       sertraline 100 MG tablet   Commonly known as:  ZOLOFT   This may have changed:  when to take this   Used for:  Generalized anxiety disorder        Dose:  200 mg   Take 2 tablets (200 mg) by mouth daily   Quantity:  180 tablet   Refills:  1                Primary Care Provider Office Phone # Fax #    Joel Daniel Irwin Wegener, [phone] [address] [place] [address]        Equal Access to Services     [facility] : Hadii mary garcia hadasho Somigdaliaali, waaxda luqadaha, qaybta kaalmada adeegyada, donis fallon. So [facility] [phone].    ATENCIÓN: Si habla español, tiene a camp disposición servicios gratuitos de asistencia lingüística. [facility] [phone].    We comply with applicable federal civil rights laws and [place] laws. We do not discriminate on the basis of race, color, national origin, age, disability, sex, sexual orientation, or gender identity.            Thank you!     Thank you for choosing [facility] HEPATOLOGY  for your care. Our goal is always to provide you with excellent care. Hearing back from our patients is one way we can  continue to improve our services. Please take a few minutes to complete the written survey that you may receive in the mail after your visit with us. Thank you!             Your Updated Medication List - Protect others around you: Learn how to safely use, store and throw away your medicines at www.disposemymeds.org.          This list is accurate as of 7/11/18  8:33 AM.  Always use your most recent med list.                   Brand Name Dispense Instructions for use Diagnosis    * ACE/ARB/ARNI NOT PRESCRIBED (INTENTIONAL)      ACE & ARB not prescribed due to Allergy        ACURA BLOOD GLUCOSE METER w/Device Kit     1 kit    1 Dose 2 times daily    Type 2 diabetes, HbA1c goal < 7% (H)       albuterol 108 (90 Base) MCG/ACT Inhaler    PROAIR HFA/PROVENTIL HFA/VENTOLIN HFA    3 Inhaler    Inhale 2 puffs into the lungs every 6 hours as needed for shortness of breath / dyspnea or wheezing Ventolin or other covered brand    Moderate persistent asthma without complication       Alcohol Pads 70 % Pads     100 each    1 Box 4 times daily    Type 2 diabetes, HbA1c goal < 7% (H)       ammonium lactate 12 % cream    LAC-HYDRIN    385 g    Apply topically 2 times daily as needed for dry skin    Xerosis cutis       baclofen 10 MG tablet    LIORESAL    180 tablet    TAKE 2 TABLETS BY MOUTH THREE TIMES DAILY    Muscle spasms of both lower extremities, Back muscle spasm       BASAGLAR 100 UNIT/ML injection     30 mL    Inject 26 Units Subcutaneous daily    Type 2 diabetes mellitus without complication, with long-term current use of insulin (H)       BD ROSE U/F 32G X 4 MM   Generic drug:  insulin pen needle     100 each    USE ONCE DAILY AS DIRECTED    Type 2 diabetes mellitus without complication, with long-term current use of insulin (H)       blood glucose monitoring lancets     100 each    Use to test blood sugar 1 time daily or as directed.    Type 2 diabetes, HbA1c goal < 7% (H)       * blood glucose monitoring test strip    no  brand specified    1 Box    Use to test blood sugars one time daily or as directed  Accucheck Richards plus test strips    Type 2 diabetes, HbA1c goal < 7% (H)       * blood glucose monitoring test strip    no brand specified    100 strip    Use to test blood sugars 1 time daily. ACCU-CHEK KARISHMA BRAND PLEASE.    Type 2 diabetes mellitus with diabetic polyneuropathy, with long-term current use of insulin (H)       cephALEXin 500 MG capsule    KEFLEX    56 capsule    Take 1 capsule (500 mg) by mouth 4 times daily    Failure of spinal cord stimulator (H)       clindamycin 300 MG capsule    CLEOCIN    56 capsule    Take 1 capsule (300 mg) by mouth 4 times daily    Failure of spinal cord stimulator (H)       COMPRESSION STOCKINGS     2 each    Knee high compression socks 30-40-mm    Lymphedema of both lower extremities       hydrochlorothiazide 25 MG tablet    HYDRODIURIL    90 tablet    TAKE 1 TABLET (25 MG) BY MOUTH DAILY    Hypertension goal BP (blood pressure) < 140/90       mupirocin 2 % ointment    BACTROBAN    2 g    Apply to anterior nares BID X 2 month supply    Nasal lesion, Nasal vestibulitis       omeprazole 40 MG capsule    priLOSEC    90 capsule    Take 1 capsule (40 mg) by mouth daily Take 30-60 minutes before a meal.    Gastroesophageal reflux disease without esophagitis       * order for DME     1 Device    Equipment being ordered: BP cuff    Hypertension goal BP (blood pressure) < 140/90       * order for DME     1 Device    Equipment being ordered: one donut for sitting    Pressure ulcer, stage II       * order for DME     5 each    ?Location:sacral ulcer unknown etiology possible sheer ?measurement: 2.5 cm x 2 cm x 0.1 cm Unlikely to be a  pressure ulcer but if it is it's a stg 2,  partial thickness, low eexudate  Wound supplies : 5 each Comfeel? Plus [id number] x 2 3/4''  5x7 cm  [facility]   wound care orders: cleans wound, dry thoroughly  and replace Comfeel. Leave in place for 7 days for 5  "weeks    Wound, open, buttock, right, initial encounter       * order for DME     1 Box    Equipment being ordered: wound care supplies CONVATEC DUODERM  Extra Thin Dressing - 1 1/4\" x 1 1/2\", Spots, oval [id number] Wound measurements 3 cm x 3 cm x 0.1 cm lesions with wound area of 1.5 x 1.5 that has slight drainage, full thickness located on the right sacral region    Benign neoplasm of skin of trunk, except scrotum       oxyCODONE IR 10 MG tablet    ROXICODONE    40 tablet    Take 1 tablet (10 mg) by mouth every 6 hours as needed for moderate to severe pain    Post laminectomy syndrome       promethazine 25 MG tablet    PHENERGAN    40 tablet    TAKE 1 TABLET (25 MG) BY MOUTH EVERY 6 HOURS AS NEEDED FOR NAUSEA    Nausea without vomiting       propranolol 20 MG tablet    INDERAL    90 tablet    TAKE 1 TABLET (20 MG) BY MOUTH 3 TIMES DAILY    Esophageal varices in cirrhosis (H)       sertraline 100 MG tablet    ZOLOFT    180 tablet    Take 2 tablets (200 mg) by mouth daily    Generalized anxiety disorder       Syringe (Disposable) 25 ML Misc     1 each    Syringe to be used for nasal irrigation    Chronic maxillary sinusitis       * Notice:  This list has 7 medication(s) that are the same as other medications prescribed for you. Read the directions carefully, and ask your doctor or other care provider to review them with you.      "

## 2018-07-24 ENCOUNTER — OFFICE VISIT (OUTPATIENT)
Dept: FAMILY MEDICINE | Facility: CLINIC | Age: 58
End: 2018-07-24
Payer: MEDICARE

## 2018-07-24 VITALS
OXYGEN SATURATION: 98 % | BODY MASS INDEX: 29.43 KG/M2 | SYSTOLIC BLOOD PRESSURE: 158 MMHG | HEART RATE: 71 BPM | RESPIRATION RATE: 16 BRPM | WEIGHT: 211 LBS | DIASTOLIC BLOOD PRESSURE: 84 MMHG | TEMPERATURE: 97.9 F

## 2018-07-24 DIAGNOSIS — E11.9 TYPE 2 DIABETES MELLITUS WITHOUT COMPLICATION, WITH LONG-TERM CURRENT USE OF INSULIN (H): ICD-10-CM

## 2018-07-24 DIAGNOSIS — E78.5 HYPERLIPIDEMIA LDL GOAL <100: ICD-10-CM

## 2018-07-24 DIAGNOSIS — J45.40 MODERATE PERSISTENT ASTHMA WITHOUT COMPLICATION: ICD-10-CM

## 2018-07-24 DIAGNOSIS — Z79.4 TYPE 2 DIABETES MELLITUS WITHOUT COMPLICATION, WITH LONG-TERM CURRENT USE OF INSULIN (H): ICD-10-CM

## 2018-07-24 DIAGNOSIS — K70.30 ALCOHOLIC CIRRHOSIS OF LIVER WITHOUT ASCITES (H): ICD-10-CM

## 2018-07-24 DIAGNOSIS — I10 HYPERTENSION GOAL BP (BLOOD PRESSURE) < 140/90: ICD-10-CM

## 2018-07-24 DIAGNOSIS — K92.0 HEMATEMESIS WITH NAUSEA: ICD-10-CM

## 2018-07-24 DIAGNOSIS — G89.4 CHRONIC PAIN SYNDROME: Primary | ICD-10-CM

## 2018-07-24 PROCEDURE — 99214 OFFICE O/P EST MOD 30 MIN: CPT | Performed by: FAMILY MEDICINE

## 2018-07-24 RX ORDER — ALBUTEROL SULFATE 90 UG/1
2 AEROSOL, METERED RESPIRATORY (INHALATION) EVERY 6 HOURS PRN
Qty: 3 INHALER | Refills: 3 | Status: SHIPPED | OUTPATIENT
Start: 2018-07-24 | End: 2019-08-25

## 2018-07-24 RX ORDER — PROPRANOLOL HYDROCHLORIDE 40 MG/1
40 TABLET ORAL 2 TIMES DAILY
Qty: 180 TABLET | Refills: 1 | Status: SHIPPED | OUTPATIENT
Start: 2018-07-24 | End: 2018-10-09

## 2018-07-24 NOTE — PATIENT INSTRUCTIONS
The 10-year ASCVD risk score (Nuno SCHOFIELD Jr et al., 2013) is: 28.7%    Values used to calculate the score:      Age: 57 years      Sex: Male      Is Non- : No      Diabetic: Yes      Tobacco smoker: No      Systolic Blood Pressure: 158 mmHg      Is BP treated: Yes      HDL Cholesterol: 43 mg/dL      Total Cholesterol: 238 mg/dL

## 2018-07-24 NOTE — PROGRESS NOTES
SUBJECTIVE:   Erwin Aguilar is a 57 year old male who presents to clinic today for the following health issues:      Pt in to follow-up with Dr. Wegener and to touch base. Not hospital follow-up.          Hospital Follow-up Visit:    Hospital/Nursing Home/IP Rehab Facility: [facility]  Date of Admission: 06/30/18  Date of Discharge: 07/02/18  Reason(s) for Admission: hematemesis.             Problems taking medications regularly:  None       Medication changes since discharge: None       Problems adhering to non-medication therapy:  None    Summary of hospitalization:  [facility] discharge summary reviewed  Diagnostic Tests/Treatments reviewed.  Follow up needed: none  Other Healthcare Providers Involved in Patient s Care:         None  Update since discharge: improved.     Post Discharge Medication Reconciliation: discharge medications reconciled, continue medications without change.  Plan of care communicated with patient     Coding guidelines for this visit:  Type of Medical   Decision Making Face-to-Face Visit       within 7 Days of discharge Face-to-Face Visit        within 14 days of discharge   Moderate Complexity [id number]   High Complexity [id number]                   Chronic pain syndrome: had spinal cord stimulator replaced, was scarred in /twisted.  Went miraculously well!  Since then has had zero leg nerve pain, much improved back pain.  No longer using/needing opiods.  Very happy with this.   Hematemesis with nausea: after hospitalization had nausea which led to hematemesis ane re-admission.  egd without apparent cause or signs of esophageal varices which he has had in past.  Thought to be ino wan, has not recurred.  Aspirin temporarily stopped due to this.  Has repead egd scheduled in several weeks.   Alcoholic cirrhosis of liver without ascites (H): sober since 2014.  States at last visit with Dr. Ramirez told cirrhosis had resolved - liver healthy.    Hypertension goal BP (blood pressure) < 140/90: stopped meds in hospital, thought blood pressue due to pain, re-start outpatient.  Using hydrochlorothiazide and propranolol.   Hyperlipidemia LDL goal <100: reviewed risk score today.  Very concerned about liver toxicity, risk to liver and muscle aches/pain with statins.  Doesn't feel cholesterol has been high.   Type 2 diabetes mellitus without complication, with long-term current use of insulin (H): he feels going well.  Last a1c at goal.    Moderate persistent asthma without complication :just needs proair refill.  Not using often but likes to have on hand.         Problem list, Medication list, Allergies, and Medical/Social/Surgical histories reviewed in [facility] and updated as appropriate.  Labs reviewed in [facility]  BP Readings from Last 3 Encounters:   07/24/18 158/84   07/11/18 157/88   07/06/18 178/83    Wt Readings from Last 3 Encounters:   07/24/18 211 lb (95.7 kg)   07/11/18 223 lb 9.6 oz (101.4 kg)   06/30/18 227 lb 14.4 oz (103.4 kg)                  Patient Active Problem List   Diagnosis     Generalized anxiety disorder     Type 2 diabetes, HbA1c goal < 7% (H)     Hyperlipidemia LDL goal <100     Hypertension goal BP (blood pressure) < 140/90     Major depressive disorder, recurrent episode, mild (H)     Chronic pain syndrome     GERD (gastroesophageal reflux disease)     Abnormal liver function tests     Health Care Home     Abnormal EMG     Hernia     Prolonged QT interval     Diastasis recti     Hypokalemia     Cauda equina syndrome with neurogenic bladder (H)     Wound infection     Cellulitis     Nausea without vomiting     Atopic dermatitis     Muscle spasms of Upper extremity     Edema of left lower extremity     Esophageal varices in cirrhosis (H)     Cirrhosis of liver (H)     Anemia due to blood loss, acute     Skin infection     Superficial thrombophlebitis of arm     Cellulitis of hand     Ganglion cyst     Dry skin dermatitis     Moderate  persistent asthma     Open wound of right heel     Fall     Closed fracture of right patella     Right knee pain     Alcoholic cirrhosis of liver without ascites (H)     Lumbosacral radiculopathy     Calculus of gallbladder without cholecystitis without obstruction     Type 2 diabetes mellitus without complication, with long-term current use of insulin (H)     Wound, open, buttock, right     Type 2 diabetes mellitus with diabetic polyneuropathy, with long-term current use of insulin (H)     Benign neoplasm of skin of trunk, except scrotum     Hematemesis with nausea     Past Surgical History:   Procedure Laterality Date     BACK SURGERY  August 2009     C APPENDECTOMY  1974     COLONOSCOPY N/A 5/12/2016    Procedure: COMBINED COLONOSCOPY, SINGLE OR MULTIPLE BIOPSY/POLYPECTOMY BY BIOPSY;  Surgeon: Ana Paula Urbina MD;  Location:  GI     ENDOSCOPY UPPER, COLONOSCOPY, COMBINED  10/19/2011    Procedure:COMBINED ENDOSCOPY UPPER, COLONOSCOPY; Upper Endoscopy, Colonoscopy with Polypectomy x4; Surgeon:AMBAR RODRÍGUEZ; Location:[facility]     ENT SURGERY  1-2016    Ongoing since then     ESOPHAGOSCOPY, GASTROSCOPY, DUODENOSCOPY (EGD), COMBINED  3/28/2014    Procedure: COMBINED ESOPHAGOSCOPY, GASTROSCOPY, DUODENOSCOPY (EGD);  EGD, Gastric Biopsies, Esophageal Banding;  Surgeon: Reyna Tovar MD;  Location:  OR     ESOPHAGOSCOPY, GASTROSCOPY, DUODENOSCOPY (EGD), COMBINED  6/9/2014    Procedure: COMBINED ESOPHAGOSCOPY, GASTROSCOPY, DUODENOSCOPY (EGD);  Surgeon: Curtis Mendez MD;  Location:  GI     ESOPHAGOSCOPY, GASTROSCOPY, DUODENOSCOPY (EGD), COMBINED  7/24/2014    Procedure: COMBINED ESOPHAGOSCOPY, GASTROSCOPY, DUODENOSCOPY (EGD);  Surgeon: Gerard Samaniego MD;  Location:  OR     ESOPHAGOSCOPY, GASTROSCOPY, DUODENOSCOPY (EGD), COMBINED N/A 10/31/2014    Procedure: COMBINED ESOPHAGOSCOPY, GASTROSCOPY, DUODENOSCOPY (EGD);  Surgeon: Gerard Samaniego MD;  Location:  OR     ESOPHAGOSCOPY,  GASTROSCOPY, DUODENOSCOPY (EGD), COMBINED N/A 5/12/2016    Procedure: COMBINED ESOPHAGOSCOPY, GASTROSCOPY, DUODENOSCOPY (EGD);  Surgeon: Ana Paula Urbina MD;  [facility]     HCL SQUAMOUS CELL CARCINOMA AG  10/07    left forearm     HERNIORRHAPHY UMBILICAL  11/8/2012    Procedure: HERNIORRHAPHY UMBILICAL;  Open Umbilical Hernia Repair With Mesh ;  Surgeon: Chase Nicholson MD;  Location: [facility] OR     INSERT STIMULATOR DORSAL COLUMN N/A 6/28/2018    Procedure: INSERT STIMULATOR DORSAL COLUMN;;  Surgeon: Elvis Sauceda MD;  Location: [facility] OR     neuro stimulator  2010     REMOVE GENERATOR STIMULATOR (LOCATION) N/A 6/28/2018    Procedure: REMOVE GENERATOR STIMULATOR (LOCATION);  Spinal Cord Stimulator and IPG Explant and Re-Implant of SCS System (Leads and IPG);  Surgeon: Elvis Sauceda MD;  Location:  OR     SURGICAL HISTORY OF -   1/02    abcess tooth     SURGICAL HISTORY OF -   1999    L4-5 laminectomy, cauda equina syndrome     SURGICAL HISTORY OF -   +    herniated disk repair     TONSILLECTOMY  10 1964     TRANSPOSITION ULNAR NERVE (ELBOW)      right       Social History   Substance Use Topics     Smoking status: Former Smoker     Packs/day: 0.50     Years: 1.00     Types: Cigarettes     Start date: 10/13/1983     Quit date: 6/9/1984     Smokeless tobacco: Never Used     Alcohol use No      Comment: a pint of alcohol / day - last drink was 3/28/14     Family History   Problem Relation Age of Onset     Cancer Mother      lung     Breast Cancer Mother      Other Cancer Mother      Cancer Father      esophogeal, GERD     Diabetes Brother      amputation, Type 1     Diabetes Brother      Diabetes Brother      Cancer - colorectal No family hx of          Current Outpatient Prescriptions   Medication Sig Dispense Refill     albuterol (PROAIR HFA/PROVENTIL HFA/VENTOLIN HFA) 108 (90 Base) MCG/ACT Inhaler Inhale 2 puffs into the lungs every 6 hours as needed for  shortness of breath / dyspnea or wheezing Ventolin or other covered brand 3 Inhaler 3     Alcohol Swabs (ALCOHOL PADS) 70 % PADS 1 Box 4 times daily 100 each 3     ammonium lactate (LAC-HYDRIN) 12 % cream Apply topically 2 times daily as needed for dry skin 385 g 3     BASAGLAR 100 UNIT/ML injection Inject 26 Units Subcutaneous daily 30 mL 11     BD ROSE U/F 32G X 4 MM insulin pen needle USE ONCE DAILY AS DIRECTED 100 each 11     blood glucose monitoring (NO BRAND SPECIFIED) test strip Use to test blood sugars 1 time daily. ACCU-CHEK KARISHMA BRAND PLEASE. 100 strip 3     blood glucose monitoring (NO BRAND SPECIFIED) test strip Use to test blood sugars one time daily or as directed    Accucheck Richards plus test strips 1 Box 5     blood glucose monitoring (SOFTCLIX) lancets Use to test blood sugar 1 time daily or as directed. 100 each 3     Blood Glucose Monitoring Suppl (ACURA BLOOD GLUCOSE METER) W/DEVICE KIT 1 Dose 2 times daily 1 kit 1     hydrochlorothiazide (HYDRODIURIL) 25 MG tablet TAKE 1 TABLET (25 MG) BY MOUTH DAILY (Patient taking differently: AM) 90 tablet 3     mupirocin (BACTROBAN) 2 % ointment Apply to anterior nares BID X 2 month supply (Patient taking differently: Apply topically as needed Apply to anterior nares BID X 2 month supply) 2 g 3     promethazine (PHENERGAN) 25 MG tablet TAKE 1 TABLET (25 MG) BY MOUTH EVERY 6 HOURS AS NEEDED FOR NAUSEA 40 tablet 11     propranolol (INDERAL) 20 MG tablet TAKE 1 TABLET (20 MG) BY MOUTH 3 TIMES DAILY 90 tablet 0     propranolol (INDERAL) 40 MG tablet Take 1 tablet (40 mg) by mouth 2 times daily 180 tablet 1     sertraline (ZOLOFT) 100 MG tablet Take 2 tablets (200 mg) by mouth daily (Patient taking differently: Take 200 mg by mouth every morning ) 180 tablet 1     Syringe, Disposable, 25 ML MISC Syringe to be used for nasal irrigation 1 each 3     ACE/ARB NOT PRESCRIBED, INTENTIONAL, ACE & ARB not prescribed due to Allergy       baclofen (LIORESAL) 10 MG tablet  "TAKE 2 TABLETS BY MOUTH THREE TIMES DAILY (Patient not taking: Reported on 7/24/2018) 180 tablet 11     omeprazole (PRILOSEC) 40 MG capsule Take 1 capsule (40 mg) by mouth daily Take 30-60 minutes before a meal. (Patient not taking: Reported on 7/24/2018) 90 capsule 3     order for DME Equipment being ordered: wound care supplies  [facility]  Extra Thin Dressing - 1 1/4\" x 1 1/2\", Spots, oval  [id number]  Wound measurements  3 cm x 3 cm x 0.1 cm lesions with wound area of 1.5 x 1.5 that has slight drainage, full thickness located on the right sacral region 1 Box 3     order for DME   Location:sacral ulcer unknown etiology possible sheer    measurement: 2.5 cm x 2 cm x 0.1 cm Unlikely to be a  pressure ulcer but if it is it's a stg 2,  partial thickness, low eexudate    Wound supplies :  5 each Comfeel  Plus Transparent 3530 2 x 2 3/4''  5x7 cm  [facility]     wound care orders: cleans wound, dry thoroughly  and replace Comfeel. Leave in place for 7 days for 5 weeks 5 each 3     order for DME Equipment being ordered: one donut for sitting (Patient not taking: Reported on 7/24/2018) 1 Device 0     ORDER FOR DME Equipment being ordered: BP cuff 1 Device 0     oxyCODONE IR (ROXICODONE) 10 MG tablet Take 1 tablet (10 mg) by mouth every 6 hours as needed for moderate to severe pain 40 tablet 0     [DISCONTINUED] albuterol (PROAIR HFA, PROVENTIL HFA, VENTOLIN HFA) 108 (90 BASE) MCG/ACT inhaler Inhale 2 puffs into the lungs every 6 hours as needed for shortness of breath / dyspnea or wheezing Ventolin or other covered brand (Patient not taking: Reported on 7/24/2018) 3 Inhaler 3     Allergies   Allergen Reactions     Lisinopril Anaphylaxis     Swollen tongue; inability to swallow; drooling; hives; swollen face, neck, angioedema     Acetaminophen      Hx of cirrhosis      Amlodipine      Swelling, hives, possible angioedema       Keflex [Cephalexin]      Patient reports that he has taken augmentin and tolerated in in " 2/2017  ( Pt was on Keflex in June 2018- no allergic symptom experienced)      Morphine      b/p dropped and arms went numb  Hypotension     Quinolones Hives     Bactrim [Sulfamethoxazole W/Trimethoprim] Rash     Levaquin Swelling and Rash     Swelling in lip and tongue felt thick     Recent Labs   Lab Test  07/11/18   0707  07/02/18   1040  07/01/18   0541   06/18/18   0911   12/20/17   1049   05/15/17   0905   12/11/15   0854   10/05/15   0923   07/03/15   0910   04/26/13   1127   A1C   --    --    --    --   6.2*   --   6.4*   --   5.8   < >   --    --    --    < >   --    < >   --    LDL   --    --    --    --    --    --    --    --   173*   --   123*   --    --    --   166*   < >   --    HDL   --    --    --    --    --    --    --    --   43   --   42   --    --    --   33*   < >   --    TRIG   --    --    --    --    --    --    --    --   109   --   74   --    --    --   105   < >   --    ALT  19  21  20   < >  20   < >   --    < >   --    < >  28   < >  24   < >  27   < >   --    CR  0.65*  0.58*  0.59*   < >  0.65*   < >  0.68   < >   --    < >   --    < >  0.78   < >  0.67   < >   --    GFRESTIMATED  >90  >90  >90   < >  >90   < >  >90   < >   --    < >   --    < >  >90  Non  GFR Calc     < >  >90  Non  GFR Calc     < >   --    GFRESTBLACK  >90  >90  >90   < >  >90   < >  >90   < >   --    < >   --    < >  >90   GFR Calc     < >  >90   GFR Calc     < >   --    POTASSIUM  3.6  3.2*  3.5   < >  3.2*   < >  3.1*   < >   --    < >  3.9   < >  3.3*   < >  3.2*   < >   --    TSH   --    --    --    --    --    --    --    --    --    --    --    --   1.08   --    --    --   0.69    < > = values in this interval not displayed.        ROS:  Constitutional, HEENT, cardiovascular, pulmonary, GI, , musculoskeletal, neuro, skin, endocrine and psych systems are negative, except as otherwise noted.        OBJECTIVE:  /84 (BP Location: Left arm,  Patient Position: Sitting, Cuff Size: Adult Regular)  Pulse 71  Temp 97.9  F (36.6  C) (Oral)  Resp 16  Wt 211 lb (95.7 kg)  SpO2 98%  BMI 29.43 kg/m2    EXAM:  GENERAL APPEARANCE: healthy, alert and no distress  No m/g/r  lctab     Vertical lumbar incision healing well, c/d/i, slight scab over suture line.   ASSESSMENT AND PLAN  Patient Instructions   The 10-year ASCVD risk score (Daytonjaja SCHOFIELD Jr, et al., 2013) is: 28.7%    Values used to calculate the score:      Age: 57 years      Sex: Male      Is Non- : No      Diabetic: Yes      Tobacco smoker: No      Systolic Blood Pressure: 158 mmHg      Is BP treated: Yes      HDL Cholesterol: 43 mg/dL      Total Cholesterol: 238 mg/dL        ASSESSMENT AND PLAN  1. Chronic pain syndrome  Excellent response to spinal stimulator, great job! Off opiods now!     2. Hematemesis with nausea  Resolved.  Colleen wan suspect.     Will check with Dr. Ramirez  (GI) after next EGD if can resume aspirin.     3. Alcoholic cirrhosis of liver without ascites (H)  Per his report resolved.     4. Hypertension goal BP (blood pressure) < 140/90  Uncontrolled.     Continue hydrochlorothiazide 25mg daily.     Increase propranolol to 40mg twice daily.     Follow up one month nurse check blood pressue scheduled.   - propranolol (INDERAL) 40 MG tablet; Take 1 tablet (40 mg) by mouth 2 times daily  Dispense: 180 tablet; Refill: 1    5. Hyperlipidemia LDL goal <100  Reviewed ascvd risk as above.  Discussed/declined statin use.  He will discuss with Dr. Ramirez (GI) if safe.  If we start it will start low.     6. Type 2 diabetes mellitus without complication, with long-term current use of insulin (H)  Controlled.  Follow up October with me for re-check.     7. Moderate persistent asthma without complication  Refilled.   - albuterol (PROAIR HFA/PROVENTIL HFA/VENTOLIN HFA) 108 (90 Base) MCG/ACT Inhaler; Inhale 2 puffs into the lungs every 6 hours as needed for shortness of  breath / dyspnea or wheezing Ventolin or other covered brand  Dispense: 3 Inhaler; Refill: 3      I spent greater than 1/2 of a 35 minute face to face encounter counseling regarding the rational for the assessment and plan noted above.     Joel Wegener,MD

## 2018-07-24 NOTE — MR AVS SNAPSHOT
After Visit Summary   7/24/2018    Erwin Aguilar    MRN: [id number]           Patient Information     Date Of Birth          1960        Visit Information        Provider Department      7/24/2018 9:20 AM Wegener, Joel Daniel Irwin, MD [facility]        Today's Diagnoses     Chronic pain syndrome    -  1    Hematemesis with nausea        Alcoholic cirrhosis of liver without ascites (H)        Hypertension goal BP (blood pressure) < 140/90        Hyperlipidemia LDL goal <100        Type 2 diabetes mellitus without complication, with long-term current use of insulin (H)        Moderate persistent asthma without complication          Care Instructions    The 10-year ASCVD risk score (Nunojaja SCHOFIELD Jr, et al., 2013) is: 28.7%    Values used to calculate the score:      Age: 57 years      Sex: Male      Is Non- : No      Diabetic: Yes      Tobacco smoker: No      Systolic Blood Pressure: 158 mmHg      Is BP treated: Yes      HDL Cholesterol: 43 mg/dL      Total Cholesterol: 238 mg/dL            Follow-ups after your visit        Your next 10 appointments already scheduled     Jul 27, 2018  8:00 AM CDT   (Arrive by 7:45 AM)   Return Visit with VANCE Benz [facility] for Comprehensive Pain Management ([facility])    [address] [place] [address]  [address] [place] [address]   [phone]            Aug 02, 2018   Procedure with Kimberly Ramirez MD   [facility] ([facility])    [address] [place] [address]  5th [facility] [id number] [phone]           Located in the Clinics and Surgery Center at [address] [place] [address].   parking is very convenient and highly recommended.  is a $6 flat rate fee.  Both  and self parkers should enter the main arrival plaza from [facility]; parking attendants will direct you based on your  parking preference.              Who to contact     If you have questions or need follow up information about today's clinic visit or your schedule please contact [facility] directly at [phone].  Normal or non-critical lab and imaging results will be communicated to you by MyChart, letter or phone within 4 business days after the clinic has received the results. If you do not hear from us within 7 days, please contact the clinic through General Compressionhart or phone. If you have a critical or abnormal lab result, we will notify you by phone as soon as possible.  Submit refill requests through Shop pirate or call your pharmacy and they will forward the refill request to us. Please allow 3 business days for your refill to be completed.          Additional Information About Your Visit        General CompressionharKu Information     Shop pirate gives you secure access to your electronic health record. If you see a primary care provider, you can also send messages to your care team and make appointments. If you have questions, please call your primary care clinic.  If you do not have a primary care provider, please call [phone] and they will assist you.        Care EveryWhere ID     This is your Care EveryWhere ID. This could be used by other organizations to access your [facility] medical records  [id number]        Your Vitals Were     Pulse Temperature Respirations Pulse Oximetry BMI (Body Mass Index)       71 97.9  F (36.6  C) (Oral) 16 98% 29.43 kg/m2        Blood Pressure from Last 3 Encounters:   07/24/18 158/84   07/11/18 157/88   07/06/18 178/83    Weight from Last 3 Encounters:   07/24/18 211 lb (95.7 kg)   07/11/18 223 lb 9.6 oz (101.4 kg)   06/30/18 227 lb 14.4 oz (103.4 kg)              Today, you had the following     No orders found for display         Today's Medication Changes          These changes are accurate as of 7/24/18  3:21 PM.  If you have any questions, ask your nurse or doctor.               These medicines  have changed or have updated prescriptions.        Dose/Directions    hydrochlorothiazide 25 MG tablet   Commonly known as:  HYDRODIURIL   This may have changed:  See the new instructions.   Used for:  Hypertension goal BP (blood pressure) < 140/90        TAKE 1 TABLET (25 MG) BY MOUTH DAILY   Quantity:  90 tablet   Refills:  3       mupirocin 2 % ointment   Commonly known as:  BACTROBAN   This may have changed:    - how to take this  - when to take this  - reasons to take this  - additional instructions   Used for:  Nasal lesion, Nasal vestibulitis        Apply to anterior nares BID X 2 month supply   Quantity:  2 g   Refills:  3       * propranolol 20 MG tablet   Commonly known as:  INDERAL   This may have changed:  Another medication with the same name was added. Make sure you understand how and when to take each.   Used for:  Esophageal varices in cirrhosis (H)   Changed by:  Wegener, Joel Daniel Irwin, MD        TAKE 1 TABLET (20 MG) BY MOUTH 3 TIMES DAILY   Quantity:  90 tablet   Refills:  0       * propranolol 40 MG tablet   Commonly known as:  INDERAL   This may have changed:  You were already taking a medication with the same name, and this prescription was added. Make sure you understand how and when to take each.   Used for:  Hypertension goal BP (blood pressure) < 140/90   Changed by:  Wegener, Joel Daniel Irwin, MD        Dose:  40 mg   Take 1 tablet (40 mg) by mouth 2 times daily   Quantity:  180 tablet   Refills:  1       sertraline 100 MG tablet   Commonly known as:  ZOLOFT   This may have changed:  when to take this   Used for:  Generalized anxiety disorder        Dose:  200 mg   Take 2 tablets (200 mg) by mouth daily   Quantity:  180 tablet   Refills:  1       * Notice:  This list has 2 medication(s) that are the same as other medications prescribed for you. Read the directions carefully, and ask your doctor or other care provider to review them with you.         Where to get your medicines       These medications were sent to [facility]/pharmacy #[id number] - [place], [place] - [address]  [address], [place] [address]     Phone:  [phone]     albuterol 108 (90 Base) MCG/ACT Inhaler    propranolol 40 MG tablet                Primary Care Provider Office Phone # Fax #    Joel Daniel Irwin Wegener, [phone]       [address]  [facility] [address]        Equal Access to Services     LI GENAO : Hadii aad ku hadasho Soomaali, waaxda luqadaha, qaybta kaalmada adeegyada, waxay idiin hayaan adeeg kharash la'aan leeanne. So [facility] [phone].    ATENCIÓN: Si habla español, tiene a camp disposición servicios gratuitos de asistencia lingüística. [facility] [phone].    We comply with applicable federal civil rights laws and [place] laws. We do not discriminate on the basis of race, color, national origin, age, disability, sex, sexual orientation, or gender identity.            Thank you!     Thank you for choosing [facility]  for your care. Our goal is always to provide you with excellent care. Hearing back from our patients is one way we can continue to improve our services. Please take a few minutes to complete the written survey that you may receive in the mail after your visit with us. Thank you!             Your Updated Medication List - Protect others around you: Learn how to safely use, store and throw away your medicines at www.disposemymeds.org.          This list is accurate as of 7/24/18  3:21 PM.  Always use your most recent med list.                   Brand Name Dispense Instructions for use Diagnosis    * ACE/ARB/ARNI NOT PRESCRIBED (INTENTIONAL)      ACE & ARB not prescribed due to Allergy        ACURA BLOOD GLUCOSE METER w/Device Kit     1 kit    1 Dose 2 times daily    Type 2 diabetes, HbA1c goal < 7% (H)       albuterol 108 (90 Base) MCG/ACT Inhaler    PROAIR HFA/PROVENTIL HFA/VENTOLIN HFA    3 Inhaler    Inhale 2 puffs into the lungs every 6 hours as  needed for shortness of breath / dyspnea or wheezing Ventolin or other covered brand    Moderate persistent asthma without complication       Alcohol Pads 70 % Pads     100 each    1 Box 4 times daily    Type 2 diabetes, HbA1c goal < 7% (H)       ammonium lactate 12 % cream    LAC-HYDRIN    385 g    Apply topically 2 times daily as needed for dry skin    Xerosis cutis       baclofen 10 MG tablet    LIORESAL    180 tablet    TAKE 2 TABLETS BY MOUTH THREE TIMES DAILY    Muscle spasms of both lower extremities, Back muscle spasm       BASAGLAR 100 UNIT/ML injection     30 mL    Inject 26 Units Subcutaneous daily    Type 2 diabetes mellitus without complication, with long-term current use of insulin (H)       BD ROSE U/F 32G X 4 MM   Generic drug:  insulin pen needle     100 each    USE ONCE DAILY AS DIRECTED    Type 2 diabetes mellitus without complication, with long-term current use of insulin (H)       blood glucose monitoring lancets     100 each    Use to test blood sugar 1 time daily or as directed.    Type 2 diabetes, HbA1c goal < 7% (H)       * blood glucose monitoring test strip    no brand specified    1 Box    Use to test blood sugars one time daily or as directed  Accucheck Richards plus test strips    Type 2 diabetes, HbA1c goal < 7% (H)       * blood glucose monitoring test strip    no brand specified    100 strip    Use to test blood sugars 1 time daily. ACCU-CHEK KARISHMA BRAND PLEASE.    Type 2 diabetes mellitus with diabetic polyneuropathy, with long-term current use of insulin (H)       hydrochlorothiazide 25 MG tablet    HYDRODIURIL    90 tablet    TAKE 1 TABLET (25 MG) BY MOUTH DAILY    Hypertension goal BP (blood pressure) < 140/90       mupirocin 2 % ointment    BACTROBAN    2 g    Apply to anterior nares BID X 2 month supply    Nasal lesion, Nasal vestibulitis       omeprazole 40 MG capsule    priLOSEC    90 capsule    Take 1 capsule (40 mg) by mouth daily Take 30-60 minutes before a meal.     "Gastroesophageal reflux disease without esophagitis       * order for DME     1 Device    Equipment being ordered: BP cuff    Hypertension goal BP (blood pressure) < 140/90       * order for DME     1 Device    Equipment being ordered: one donut for sitting    Pressure ulcer, stage II       * order for DME     5 each    ?Location:sacral ulcer unknown etiology possible sheer ?measurement: 2.5 cm x 2 cm x 0.1 cm Unlikely to be a  pressure ulcer but if it is it's a stg 2,  partial thickness, low eexudate  Wound supplies : 5 each Comfeel? Plus [id number] x 2 3/4''  5x7 cm  [facility]   wound care orders: cleans wound, dry thoroughly  and replace Comfeel. Leave in place for 7 days for 5 weeks    Wound, open, buttock, right, initial encounter       * order for DME     1 Box    Equipment being ordered: wound care supplies CONVATEC DUODERM  Extra Thin Dressing - 1 1/4\" x 1 1/2\", Spots, oval [id number] Wound measurements 3 cm x 3 cm x 0.1 cm lesions with wound area of 1.5 x 1.5 that has slight drainage, full thickness located on the right sacral region    Benign neoplasm of skin of trunk, except scrotum       oxyCODONE IR 10 MG tablet    ROXICODONE    40 tablet    Take 1 tablet (10 mg) by mouth every 6 hours as needed for moderate to severe pain    Post laminectomy syndrome       promethazine 25 MG tablet    PHENERGAN    40 tablet    TAKE 1 TABLET (25 MG) BY MOUTH EVERY 6 HOURS AS NEEDED FOR NAUSEA    Nausea without vomiting       * propranolol 20 MG tablet    INDERAL    90 tablet    TAKE 1 TABLET (20 MG) BY MOUTH 3 TIMES DAILY    Esophageal varices in cirrhosis (H)       * propranolol 40 MG tablet    INDERAL    180 tablet    Take 1 tablet (40 mg) by mouth 2 times daily    Hypertension goal BP (blood pressure) < 140/90       sertraline 100 MG tablet    ZOLOFT    180 tablet    Take 2 tablets (200 mg) by mouth daily    Generalized anxiety disorder       Syringe (Disposable) 25 ML Misc     1 each    Syringe to be used for " nasal irrigation    Chronic maxillary sinusitis       * Notice:  This list has 9 medication(s) that are the same as other medications prescribed for you. Read the directions carefully, and ask your doctor or other care provider to review them with you.

## 2018-07-27 ENCOUNTER — OFFICE VISIT (OUTPATIENT)
Dept: ANESTHESIOLOGY | Facility: CLINIC | Age: 58
End: 2018-07-27
Payer: MEDICARE

## 2018-07-27 ENCOUNTER — TELEPHONE (OUTPATIENT)
Dept: GASTROENTEROLOGY | Facility: CLINIC | Age: 58
End: 2018-07-27

## 2018-07-27 VITALS
HEART RATE: 56 BPM | DIASTOLIC BLOOD PRESSURE: 78 MMHG | HEIGHT: 71 IN | SYSTOLIC BLOOD PRESSURE: 153 MMHG | WEIGHT: 211 LBS | RESPIRATION RATE: 16 BRPM | BODY MASS INDEX: 29.54 KG/M2

## 2018-07-27 DIAGNOSIS — M54.17 LUMBOSACRAL RADICULOPATHY: ICD-10-CM

## 2018-07-27 DIAGNOSIS — Z96.89 SPINAL CORD STIMULATOR STATUS: Primary | ICD-10-CM

## 2018-07-27 ASSESSMENT — ANXIETY QUESTIONNAIRES
2. NOT BEING ABLE TO STOP OR CONTROL WORRYING: NOT AT ALL
GAD7 TOTAL SCORE: 0
1. FEELING NERVOUS, ANXIOUS, OR ON EDGE: NOT AT ALL
4. TROUBLE RELAXING: NOT AT ALL
5. BEING SO RESTLESS THAT IT IS HARD TO SIT STILL: NOT AT ALL
7. FEELING AFRAID AS IF SOMETHING AWFUL MIGHT HAPPEN: NOT AT ALL
3. WORRYING TOO MUCH ABOUT DIFFERENT THINGS: NOT AT ALL
GAD7 TOTAL SCORE: 0
GAD7 TOTAL SCORE: 0
6. BECOMING EASILY ANNOYED OR IRRITABLE: NOT AT ALL
7. FEELING AFRAID AS IF SOMETHING AWFUL MIGHT HAPPEN: NOT AT ALL

## 2018-07-27 ASSESSMENT — PAIN SCALES - GENERAL: PAINLEVEL: MILD PAIN (3)

## 2018-07-27 NOTE — PROGRESS NOTES
"Date of visit: 7/27/2018    Chief complaint:   Chief Complaint   Patient presents with     Pain Management     Follow up        Interval history:  Erwin Aguilar is a 57 year old male last seen by my colleague, Dr. Elvis Sauceda, on 7/6/18 for 7 day post-operative visit.  Erwin is a 57-year-old male who presents to the pain clinic as a follow-up patient after his spinal cord stimulator explant and reimplantation surgery with [employer]. He underwent the surgery on June 28 and his GABRIELA drain was pulled on July 2, 2018, while he was an inpatient at the [facility].    He was doing well at one-week postoperative visit and continues to be doing well today. He states he has no \"nerve pain\" whatsoever and is obtaining complete coverage. He reports he was able to walk four blocks to [facility] this week and wakens each morning without pain. He has been adherent to activity restrictions. He does find that using both leads drains his battery more quickly and is anxious to have more programming options. [employer] representative is present today.     Medications:  Current Outpatient Prescriptions   Medication Sig Dispense Refill     ACE/ARB NOT PRESCRIBED, INTENTIONAL, ACE & ARB not prescribed due to Allergy       albuterol (PROAIR HFA/PROVENTIL HFA/VENTOLIN HFA) 108 (90 Base) MCG/ACT Inhaler Inhale 2 puffs into the lungs every 6 hours as needed for shortness of breath / dyspnea or wheezing Ventolin or other covered brand 3 Inhaler 3     Alcohol Swabs (ALCOHOL PADS) 70 % PADS 1 Box 4 times daily 100 each 3     ammonium lactate (LAC-HYDRIN) 12 % cream Apply topically 2 times daily as needed for dry skin 385 g 3     BASAGLAR 100 UNIT/ML injection Inject 26 Units Subcutaneous daily 30 mL 11     BD ROSE U/F 32G X 4 MM insulin pen needle USE ONCE DAILY AS DIRECTED 100 each 11     blood glucose monitoring (NO BRAND SPECIFIED) test strip Use to test blood sugars 1 time daily. ACCU-CHEK KARISHMA BRAND PLEASE. 100 strip 3     blood " "glucose monitoring (NO BRAND SPECIFIED) test strip Use to test blood sugars one time daily or as directed    Accucheck Richards plus test strips 1 Box 5     blood glucose monitoring (SOFTCLIX) lancets Use to test blood sugar 1 time daily or as directed. 100 each 3     Blood Glucose Monitoring Suppl (ACURA BLOOD GLUCOSE METER) W/DEVICE KIT 1 Dose 2 times daily 1 kit 1     hydrochlorothiazide (HYDRODIURIL) 25 MG tablet TAKE 1 TABLET (25 MG) BY MOUTH DAILY (Patient taking differently: AM) 90 tablet 3     mupirocin (BACTROBAN) 2 % ointment Apply to anterior nares BID X 2 month supply (Patient taking differently: Apply topically as needed Apply to anterior nares BID X 2 month supply) 2 g 3     order for DME Equipment being ordered: wound care supplies  [facility] DUODERM  Extra Thin Dressing - 1 1/4\" x 1 1/2\", Spots, oval  [id number]  Wound measurements  3 cm x 3 cm x 0.1 cm lesions with wound area of 1.5 x 1.5 that has slight drainage, full thickness located on the right sacral region 1 Box 3     order for DME   Location:sacral ulcer unknown etiology possible sheer    measurement: 2.5 cm x 2 cm x 0.1 cm Unlikely to be a  pressure ulcer but if it is it's a stg 2,  partial thickness, low eexudate    Wound supplies :  5 each Comfeel  Plus Transparent 3530 2 x 2 3/4''  5x7 cm  formerly [facility]     wound care orders: cleans wound, dry thoroughly  and replace Comfeel. Leave in place for 7 days for 5 weeks 5 each 3     ORDER FOR DME Equipment being ordered: BP cuff 1 Device 0     oxyCODONE IR (ROXICODONE) 10 MG tablet Take 1 tablet (10 mg) by mouth every 6 hours as needed for moderate to severe pain 40 tablet 0     promethazine (PHENERGAN) 25 MG tablet TAKE 1 TABLET (25 MG) BY MOUTH EVERY 6 HOURS AS NEEDED FOR NAUSEA 40 tablet 11     propranolol (INDERAL) 20 MG tablet TAKE 1 TABLET (20 MG) BY MOUTH 3 TIMES DAILY 90 tablet 0     propranolol (INDERAL) 40 MG tablet Take 1 tablet (40 mg) by mouth 2 times daily 180 tablet 1     sertraline " "(ZOLOFT) 100 MG tablet Take 2 tablets (200 mg) by mouth daily (Patient taking differently: Take 200 mg by mouth every morning ) 180 tablet 1     Syringe, Disposable, 25 ML MISC Syringe to be used for nasal irrigation 1 each 3     baclofen (LIORESAL) 10 MG tablet TAKE 2 TABLETS BY MOUTH THREE TIMES DAILY (Patient not taking: Reported on 7/24/2018) 180 tablet 11     omeprazole (PRILOSEC) 40 MG capsule Take 1 capsule (40 mg) by mouth daily Take 30-60 minutes before a meal. (Patient not taking: Reported on 7/24/2018) 90 capsule 3     order for DME Equipment being ordered: one donut for sitting (Patient not taking: Reported on 7/24/2018) 1 Device 0       Medical History: any changes in medical history since they were last seen? No    Review of Systems:  The 14 system ROS was reviewed from the intake questionnaire; results listed at end of note.     Physical Exam:  Blood pressure 153/78, pulse 56, resp. rate 16, height 1.803 m (5' 11\"), weight 95.7 kg (211 lb).  General: NAD  Gait: Stable; use of cane.   Skin: Surgical and GABRIELA sites well healed and fully epithelialized. No signs of infection: no redness, swelling, drainage.      Assessment:   Erwin Aguilar is a 57 year old male who is seen at the pain clinic for 4-week post-operative spinal cord stimulator reimplantation.    Plan:  Discussed with patient that he should maintain activity restrictions for an additional 2-4 weeks. Return in six months, or as needed, for SCS optimization.     Total time spent was 10 minutes, and more than 50% of face to face time was spent in counseling and/or coordination of care regarding plan of care.    VANCE Benz, [facility]  Pain Management  Department of Interventional Pain Management and Anesthesiology   ealth        Answers for HPI/ROS submitted by the patient on 7/27/2018   JATIN 7 TOTAL SCORE: 0  PHQ-2 Score: 0    "

## 2018-07-27 NOTE — MR AVS SNAPSHOT
After Visit Summary   7/27/2018    Erwin Aguilar    MRN: [id number]           Patient Information     Date Of Birth          1960        Visit Information        Provider Department      7/27/2018 8:00 AM Estrella Ruelas APRN [facility]        Care Instructions    1. Restrict your activities for 2 to 4 weeks, including no bending, twisting, or lifting over 5 pounds.      Follow up: 6 months or spinal cord stimulator program optimization       To speak with a nurse, schedule/reschedule/cancel a clinic appointment, or request a medication refill call: [phone]     You can also reach us by Z[employer]: [email or web address]    For refills, please call on Monday, 1 week before your medication runs out. The doctors are not always in clinic, so this gives us time to get your prescriptions ready.  Please let us know the name of the medication you are requesting a refill of.                                     Follow-ups after your visit        Your next 10 appointments already scheduled     Aug 02, 2018   Procedure with Kimberly Ramirez MD   [facility] ([facility])    [address] [place], [place] [id number]   [phone]           Located in the Clinics and Surgery Center at [address] [place] [address].   parking is very convenient and highly recommended.  is a $6 flat rate fee.  Both  and self parkers should enter the main arrival plaza from [facility]; parking attendants will direct you based on your parking preference.            Sep 04, 2018  9:20 AM LEOPOLDOT   Pancho Crain with Joel Daniel Irwin Wegener, MD   [facility]    [address] [place] [address] [phone]            Oct 09, 2018  8:40 AM CDT   Pancho Dickinson with Joel Daniel Irwin Wegener, MD   [facility]  "[facility] ([facility])    [address] [place] [address] [phone]              Who to contact     Please call your clinic at [phone] to:    Ask questions about your health    Make or cancel appointments    Discuss your medicines    Learn about your test results    Speak to your doctor            Additional Information About Your Visit        Ingeniatricshargifted2you Information     Selleroutlet gives you secure access to your electronic health record. If you see a primary care provider, you can also send messages to your care team and make appointments. If you have questions, please call your primary care clinic.  If you do not have a primary care provider, please call [phone] and they will assist you.      Selleroutlet is an electronic gateway that provides easy, online access to your medical records. With Selleroutlet, you can request a clinic appointment, read your test results, renew a prescription or communicate with your care team.     To access your existing account, please contact your [facility] or call [phone] for assistance.        Care EveryWhere ID     This is your Care EveryWhere ID. This could be used by other organizations to access your [facility] medical records  [id number]        Your Vitals Were     Pulse Respirations Height BMI (Body Mass Index)          56 16 1.803 m (5' 11\") 29.43 kg/m2         Blood Pressure from Last 3 Encounters:   07/27/18 153/78   07/24/18 158/84   07/11/18 157/88    Weight from Last 3 Encounters:   07/27/18 95.7 kg (211 lb)   07/24/18 95.7 kg (211 lb)   07/11/18 101.4 kg (223 lb 9.6 oz)              Today, you had the following     No orders found for display         Today's Medication Changes          These changes are accurate as of 7/27/18  8:13 AM.  If you have any questions, ask your nurse or doctor.               These medicines have changed or have updated prescriptions.        Dose/Directions    " hydrochlorothiazide 25 MG tablet   Commonly known as:  HYDRODIURIL   This may have changed:  See the new instructions.   Used for:  Hypertension goal BP (blood pressure) < 140/90        TAKE 1 TABLET (25 MG) BY MOUTH DAILY   Quantity:  90 tablet   Refills:  3       mupirocin 2 % ointment   Commonly known as:  BACTROBAN   This may have changed:    - how to take this  - when to take this  - reasons to take this  - additional instructions   Used for:  Nasal lesion, Nasal vestibulitis        Apply to anterior nares BID X 2 month supply   Quantity:  2 g   Refills:  3       sertraline 100 MG tablet   Commonly known as:  ZOLOFT   This may have changed:  when to take this   Used for:  Generalized anxiety disorder        Dose:  200 mg   Take 2 tablets (200 mg) by mouth daily   Quantity:  180 tablet   Refills:  1                Primary Care Provider Office Phone # Fax #    Demario Daniel Irwin Wegener, [phone] [address] [address]        Equal Access to Services     [facility]: Hadii mary ku hadasho Soomaali, waaxda luqadaha, qaybta kaalmada adeegyada, waxay idiin haygeon francisco allison . So [facility] [phone].    ATENCIÓN: Si habla español, tiene a camp disposición servicios gratuitos de asistencia lingüística. Llame al [phone].    We comply with applicable federal civil rights laws and [place] laws. We do not discriminate on the basis of race, color, national origin, age, disability, sex, sexual orientation, or gender identity.            Thank you!     Thank you for choosing [facility]  for your care. Our goal is always to provide you with excellent care. Hearing back from our patients is one way we can continue to improve our services. Please take a few minutes to complete the written survey that you may receive in the mail after your visit with us. Thank you!             Your Updated Medication List - Protect others around you:  Learn how to safely use, store and throw away your medicines at www.disposemymeds.org.          This list is accurate as of 7/27/18  8:13 AM.  Always use your most recent med list.                   Brand Name Dispense Instructions for use Diagnosis    * ACE/ARB/ARNI NOT PRESCRIBED (INTENTIONAL)      ACE & ARB not prescribed due to Allergy        ACURA BLOOD GLUCOSE METER w/Device Kit     1 kit    1 Dose 2 times daily    Type 2 diabetes, HbA1c goal < 7% (H)       albuterol 108 (90 Base) MCG/ACT Inhaler    PROAIR HFA/PROVENTIL HFA/VENTOLIN HFA    3 Inhaler    Inhale 2 puffs into the lungs every 6 hours as needed for shortness of breath / dyspnea or wheezing Ventolin or other covered brand    Moderate persistent asthma without complication       Alcohol Pads 70 % Pads     100 each    1 Box 4 times daily    Type 2 diabetes, HbA1c goal < 7% (H)       ammonium lactate 12 % cream    LAC-HYDRIN    385 g    Apply topically 2 times daily as needed for dry skin    Xerosis cutis       baclofen 10 MG tablet    LIORESAL    180 tablet    TAKE 2 TABLETS BY MOUTH THREE TIMES DAILY    Muscle spasms of both lower extremities, Back muscle spasm       BASAGLAR 100 UNIT/ML injection     30 mL    Inject 26 Units Subcutaneous daily    Type 2 diabetes mellitus without complication, with long-term current use of insulin (H)       BD ROSE U/F 32G X 4 MM   Generic drug:  insulin pen needle     100 each    USE ONCE DAILY AS DIRECTED    Type 2 diabetes mellitus without complication, with long-term current use of insulin (H)       blood glucose monitoring lancets     100 each    Use to test blood sugar 1 time daily or as directed.    Type 2 diabetes, HbA1c goal < 7% (H)       * blood glucose monitoring test strip    no brand specified    1 Box    Use to test blood sugars one time daily or as directed  Accucheck Richards plus test strips    Type 2 diabetes, HbA1c goal < 7% (H)       * blood glucose monitoring test strip    no brand specified    100  "strip    Use to test blood sugars 1 time daily. ACCU-CHEK KARISHMA BRAND PLEASE.    Type 2 diabetes mellitus with diabetic polyneuropathy, with long-term current use of insulin (H)       hydrochlorothiazide 25 MG tablet    HYDRODIURIL    90 tablet    TAKE 1 TABLET (25 MG) BY MOUTH DAILY    Hypertension goal BP (blood pressure) < 140/90       mupirocin 2 % ointment    BACTROBAN    2 g    Apply to anterior nares BID X 2 month supply    Nasal lesion, Nasal vestibulitis       omeprazole 40 MG capsule    priLOSEC    90 capsule    Take 1 capsule (40 mg) by mouth daily Take 30-60 minutes before a meal.    Gastroesophageal reflux disease without esophagitis       * order for DME     1 Device    Equipment being ordered: BP cuff    Hypertension goal BP (blood pressure) < 140/90       * order for DME     1 Device    Equipment being ordered: one donut for sitting    Pressure ulcer, stage II       * order for DME     5 each    ?Location:sacral ulcer unknown etiology possible sheer ?measurement: 2.5 cm x 2 cm x 0.1 cm Unlikely to be a  pressure ulcer but if it is it's a stg 2,  partial thickness, low eexudate  Wound supplies : 5 each Comfeel? Plus [id number] x 2 3/4''  5x7 cm  [facility]   wound care orders: cleans wound, dry thoroughly  and replace Comfeel. Leave in place for 7 days for 5 weeks    Wound, open, buttock, right, initial encounter       * order for DME     1 Box    Equipment being ordered: wound care supplies CONVATEC DUODERM  Extra Thin Dressing - 1 1/4\" x 1 1/2\", Spots, oval [id number] Wound measurements 3 cm x 3 cm x 0.1 cm lesions with wound area of 1.5 x 1.5 that has slight drainage, full thickness located on the right sacral region    Benign neoplasm of skin of trunk, except scrotum       oxyCODONE IR 10 MG tablet    ROXICODONE    40 tablet    Take 1 tablet (10 mg) by mouth every 6 hours as needed for moderate to severe pain    Post laminectomy syndrome       promethazine 25 MG tablet    PHENERGAN    40 " tablet    TAKE 1 TABLET (25 MG) BY MOUTH EVERY 6 HOURS AS NEEDED FOR NAUSEA    Nausea without vomiting       * propranolol 20 MG tablet    INDERAL    90 tablet    TAKE 1 TABLET (20 MG) BY MOUTH 3 TIMES DAILY    Esophageal varices in cirrhosis (H)       * propranolol 40 MG tablet    INDERAL    180 tablet    Take 1 tablet (40 mg) by mouth 2 times daily    Hypertension goal BP (blood pressure) < 140/90       sertraline 100 MG tablet    ZOLOFT    180 tablet    Take 2 tablets (200 mg) by mouth daily    Generalized anxiety disorder       Syringe (Disposable) 25 ML Misc     1 each    Syringe to be used for nasal irrigation    Chronic maxillary sinusitis       * Notice:  This list has 9 medication(s) that are the same as other medications prescribed for you. Read the directions carefully, and ask your doctor or other care provider to review them with you.

## 2018-07-27 NOTE — PATIENT INSTRUCTIONS
1. Restrict your activities for 2 to 4 weeks, including no bending, twisting, or lifting over 5 pounds.      Follow up: 6 months or spinal cord stimulator program optimization       To speak with a nurse, schedule/reschedule/cancel a clinic appointment, or request a medication refill call: [phone]     You can also reach us by [facility]: [email or web address]    For refills, please call on Monday, 1 week before your medication runs out. The doctors are not always in clinic, so this gives us time to get your prescriptions ready.  Please let us know the name of the medication you are requesting a refill of.

## 2018-07-27 NOTE — LETTER
"7/27/2018       RE: Erwin Aguilar  [address]  [place] [address]     Dear Colleague,    Thank you for referring your patient, Erwin Aguilar, to the [facility] at [facility]. Please see a copy of my visit note below.    Date of visit: 7/27/2018    Chief complaint:   Chief Complaint   Patient presents with     Pain Management     Follow up        Interval history:  Erwin Aguilar is a 57 year old male last seen by my colleague, Dr. Elvis Sauceda, on 7/6/18 for 7 day post-operative visit.  Erwin is a 57-year-old male who presents to the pain clinic as a follow-up patient after his spinal cord stimulator explant and reimplantation surgery with [employer]. He underwent the surgery on June 28 and his GABRIELA drain was pulled on July 2, 2018, while he was an inpatient at the [facility].    He was doing well at one-week postoperative visit and continues to be doing well today. He states he has no \"nerve pain\" whatsoever and is obtaining complete coverage. He reports he was able to walk four blocks to [facility] this week and wakens each morning without pain. He has been adherent to activity restrictions. He does find that using both leads drains his battery more quickly and is anxious to have more programming options. [employer] representative is present today.     Medications:  Current Outpatient Prescriptions   Medication Sig Dispense Refill     ACE/ARB NOT PRESCRIBED, INTENTIONAL, ACE & ARB not prescribed due to Allergy       albuterol (PROAIR HFA/PROVENTIL HFA/VENTOLIN HFA) 108 (90 Base) MCG/ACT Inhaler Inhale 2 puffs into the lungs every 6 hours as needed for shortness of breath / dyspnea or wheezing Ventolin or other covered brand 3 Inhaler 3     Alcohol Swabs (ALCOHOL PADS) 70 % PADS 1 Box 4 times daily 100 each 3     ammonium lactate (LAC-HYDRIN) 12 % cream Apply topically 2 times daily as needed for dry skin 385 g 3     " "BASAGLAR 100 UNIT/ML injection Inject 26 Units Subcutaneous daily 30 mL 11     BD ROSE U/F 32G X 4 MM insulin pen needle USE ONCE DAILY AS DIRECTED 100 each 11     blood glucose monitoring (NO BRAND SPECIFIED) test strip Use to test blood sugars 1 time daily. ACCU-CHEK KARISHMA BRAND PLEASE. 100 strip 3     blood glucose monitoring (NO BRAND SPECIFIED) test strip Use to test blood sugars one time daily or as directed    Accucheck Richards plus test strips 1 Box 5     blood glucose monitoring (SOFTCLIX) lancets Use to test blood sugar 1 time daily or as directed. 100 each 3     Blood Glucose Monitoring Suppl (ACURA BLOOD GLUCOSE METER) W/DEVICE KIT 1 Dose 2 times daily 1 kit 1     hydrochlorothiazide (HYDRODIURIL) 25 MG tablet TAKE 1 TABLET (25 MG) BY MOUTH DAILY (Patient taking differently: AM) 90 tablet 3     mupirocin (BACTROBAN) 2 % ointment Apply to anterior nares BID X 2 month supply (Patient taking differently: Apply topically as needed Apply to anterior nares BID X 2 month supply) 2 g 3     order for DME Equipment being ordered: wound care supplies  [facility] DUODERM  Extra Thin Dressing - 1 1/4\" x 1 1/2\", Spots, oval  [id number]  Wound measurements  3 cm x 3 cm x 0.1 cm lesions with wound area of 1.5 x 1.5 that has slight drainage, full thickness located on the right sacral region 1 Box 3     order for DME   Location:sacral ulcer unknown etiology possible sheer    measurement: 2.5 cm x 2 cm x 0.1 cm Unlikely to be a  pressure ulcer but if it is it's a stg 2,  partial thickness, low eexudate    Wound supplies :  5 each Comfeel  Plus Transparent 3530 2 x 2 3/4''  5x7 cm  [facility]     wound care orders: cleans wound, dry thoroughly  and replace Comfeel. Leave in place for 7 days for 5 weeks 5 each 3     ORDER FOR DME Equipment being ordered: BP cuff 1 Device 0     oxyCODONE IR (ROXICODONE) 10 MG tablet Take 1 tablet (10 mg) by mouth every 6 hours as needed for moderate to severe pain 40 tablet 0     promethazine " "(PHENERGAN) 25 MG tablet TAKE 1 TABLET (25 MG) BY MOUTH EVERY 6 HOURS AS NEEDED FOR NAUSEA 40 tablet 11     propranolol (INDERAL) 20 MG tablet TAKE 1 TABLET (20 MG) BY MOUTH 3 TIMES DAILY 90 tablet 0     propranolol (INDERAL) 40 MG tablet Take 1 tablet (40 mg) by mouth 2 times daily 180 tablet 1     sertraline (ZOLOFT) 100 MG tablet Take 2 tablets (200 mg) by mouth daily (Patient taking differently: Take 200 mg by mouth every morning ) 180 tablet 1     Syringe, Disposable, 25 ML MISC Syringe to be used for nasal irrigation 1 each 3     baclofen (LIORESAL) 10 MG tablet TAKE 2 TABLETS BY MOUTH THREE TIMES DAILY (Patient not taking: Reported on 7/24/2018) 180 tablet 11     omeprazole (PRILOSEC) 40 MG capsule Take 1 capsule (40 mg) by mouth daily Take 30-60 minutes before a meal. (Patient not taking: Reported on 7/24/2018) 90 capsule 3     order for DME Equipment being ordered: one donut for sitting (Patient not taking: Reported on 7/24/2018) 1 Device 0       Medical History: any changes in medical history since they were last seen? No    Review of Systems:  The 14 system ROS was reviewed from the intake questionnaire; results listed at end of note.     Physical Exam:  Blood pressure 153/78, pulse 56, resp. rate 16, height 1.803 m (5' 11\"), weight 95.7 kg (211 lb).  General: NAD  Gait: Stable; use of cane.   Skin: Surgical and GABRIELA sites well healed and fully epithelialized. No signs of infection: no redness, swelling, drainage.      Assessment:   Erwin Aguilar is a 57 year old male who is seen at the pain clinic for 4-week post-operative spinal cord stimulator reimplantation.    Plan:  Discussed with patient that he should maintain activity restrictions for an additional 2-4 weeks. Return in six months, or as needed, for SCS optimization.     Total time spent was 10 minutes, and more than 50% of face to face time was spent in counseling and/or coordination of care regarding plan of care.    VANCE Benz, " AGSANIA-BC  Pain Management  Department of Interventional Pain Management and Anesthesiology   [facility]

## 2018-07-28 ASSESSMENT — ANXIETY QUESTIONNAIRES: GAD7 TOTAL SCORE: 0

## 2018-07-31 ENCOUNTER — TELEPHONE (OUTPATIENT)
Dept: GASTROENTEROLOGY | Facility: CLINIC | Age: 58
End: 2018-07-31

## 2018-07-31 NOTE — TELEPHONE ENCOUNTER
Patient scheduled for EGD     Indication for procedure. Alcoholic cirrhosis of liver without ascites, Gastrointestinal hemorrhage with hematemesis     Referring Provider. Kimberly Ramirez MD    ? No     Arrival time verified? Patient to arrive at 920 am     Facility location verified? [address]     Instructions given regarding prep and procedure., transportation policy reviewed and verbalized understanding     Prep Type NPO     Are you taking any anticoagulants or blood thinners? Denies     Instructions given? Yes     Electronic implanted devices? Denies     Pre procedure teaching completed? Yes    Transportation from procedure? Yes     H&P / Pre op physical completed? With Ashley 7/11     Shorty Cross RN

## 2018-08-02 ENCOUNTER — HOSPITAL ENCOUNTER (OUTPATIENT)
Facility: CLINIC | Age: 58
Discharge: HOME | End: 2018-08-02
Attending: INTERNAL MEDICINE | Admitting: INTERNAL MEDICINE
Payer: MEDICARE

## 2018-08-02 ENCOUNTER — ANESTHESIA EVENT (OUTPATIENT)
Dept: GASTROENTEROLOGY | Facility: CLINIC | Age: 58
End: 2018-08-02
Payer: MEDICARE

## 2018-08-02 ENCOUNTER — SURGERY (OUTPATIENT)
Age: 58
End: 2018-08-02

## 2018-08-02 ENCOUNTER — ANESTHESIA (OUTPATIENT)
Dept: GASTROENTEROLOGY | Facility: CLINIC | Age: 58
End: 2018-08-02
Payer: MEDICARE

## 2018-08-02 VITALS
HEART RATE: 55 BPM | SYSTOLIC BLOOD PRESSURE: 133 MMHG | BODY MASS INDEX: 30.68 KG/M2 | DIASTOLIC BLOOD PRESSURE: 73 MMHG | WEIGHT: 220 LBS | TEMPERATURE: 98.1 F | OXYGEN SATURATION: 97 % | RESPIRATION RATE: 9 BRPM

## 2018-08-02 LAB
GLUCOSE BLDC GLUCOMTR-MCNC: 140 MG/DL (ref 70–99)
UPPER GI ENDOSCOPY: NORMAL

## 2018-08-02 PROCEDURE — 37000008 ZZH ANESTHESIA TECHNICAL FEE, 1ST 30 MIN: Performed by: INTERNAL MEDICINE

## 2018-08-02 PROCEDURE — 25000132 ZZH RX MED GY IP 250 OP 250 PS 637: Mod: GY | Performed by: INTERNAL MEDICINE

## 2018-08-02 PROCEDURE — 82962 GLUCOSE BLOOD TEST: CPT

## 2018-08-02 PROCEDURE — 25000125 ZZHC RX 250: Performed by: NURSE ANESTHETIST, CERTIFIED REGISTERED

## 2018-08-02 PROCEDURE — 37000009 ZZH ANESTHESIA TECHNICAL FEE, EACH ADDTL 15 MIN: Performed by: INTERNAL MEDICINE

## 2018-08-02 PROCEDURE — 25000128 H RX IP 250 OP 636: Performed by: NURSE ANESTHETIST, CERTIFIED REGISTERED

## 2018-08-02 PROCEDURE — 43235 EGD DIAGNOSTIC BRUSH WASH: CPT | Performed by: INTERNAL MEDICINE

## 2018-08-02 RX ORDER — EPHEDRINE SULFATE 50 MG/ML
INJECTION, SOLUTION INTRAMUSCULAR; INTRAVENOUS; SUBCUTANEOUS PRN
Status: DISCONTINUED | OUTPATIENT
Start: 2018-08-02 | End: 2018-08-02

## 2018-08-02 RX ORDER — ONDANSETRON 4 MG/1
4 TABLET, ORALLY DISINTEGRATING ORAL EVERY 30 MIN PRN
Status: CANCELLED | OUTPATIENT
Start: 2018-08-02

## 2018-08-02 RX ORDER — GLYCOPYRROLATE 0.2 MG/ML
INJECTION, SOLUTION INTRAMUSCULAR; INTRAVENOUS PRN
Status: DISCONTINUED | OUTPATIENT
Start: 2018-08-02 | End: 2018-08-02

## 2018-08-02 RX ORDER — LIDOCAINE HYDROCHLORIDE 20 MG/ML
INJECTION, SOLUTION INFILTRATION; PERINEURAL PRN
Status: DISCONTINUED | OUTPATIENT
Start: 2018-08-02 | End: 2018-08-02

## 2018-08-02 RX ORDER — SODIUM CHLORIDE, SODIUM LACTATE, POTASSIUM CHLORIDE, CALCIUM CHLORIDE 600; 310; 30; 20 MG/100ML; MG/100ML; MG/100ML; MG/100ML
INJECTION, SOLUTION INTRAVENOUS CONTINUOUS PRN
Status: DISCONTINUED | OUTPATIENT
Start: 2018-08-02 | End: 2018-08-02

## 2018-08-02 RX ORDER — FENTANYL CITRATE 50 UG/ML
25-50 INJECTION, SOLUTION INTRAMUSCULAR; INTRAVENOUS
Status: CANCELLED | OUTPATIENT
Start: 2018-08-02

## 2018-08-02 RX ORDER — SODIUM CHLORIDE, SODIUM LACTATE, POTASSIUM CHLORIDE, CALCIUM CHLORIDE 600; 310; 30; 20 MG/100ML; MG/100ML; MG/100ML; MG/100ML
INJECTION, SOLUTION INTRAVENOUS CONTINUOUS
Status: CANCELLED | OUTPATIENT
Start: 2018-08-02

## 2018-08-02 RX ORDER — PROPOFOL 10 MG/ML
INJECTION, EMULSION INTRAVENOUS CONTINUOUS PRN
Status: DISCONTINUED | OUTPATIENT
Start: 2018-08-02 | End: 2018-08-02

## 2018-08-02 RX ORDER — ONDANSETRON 2 MG/ML
INJECTION INTRAMUSCULAR; INTRAVENOUS PRN
Status: DISCONTINUED | OUTPATIENT
Start: 2018-08-02 | End: 2018-08-02

## 2018-08-02 RX ORDER — NALOXONE HYDROCHLORIDE 0.4 MG/ML
.1-.4 INJECTION, SOLUTION INTRAMUSCULAR; INTRAVENOUS; SUBCUTANEOUS
Status: CANCELLED | OUTPATIENT
Start: 2018-08-02 | End: 2018-08-03

## 2018-08-02 RX ORDER — ONDANSETRON 2 MG/ML
4 INJECTION INTRAMUSCULAR; INTRAVENOUS EVERY 30 MIN PRN
Status: CANCELLED | OUTPATIENT
Start: 2018-08-02

## 2018-08-02 RX ORDER — SIMETHICONE
LIQUID (ML) MISCELLANEOUS PRN
Status: DISCONTINUED | OUTPATIENT
Start: 2018-08-02 | End: 2018-08-02 | Stop reason: HOSPADM

## 2018-08-02 RX ADMIN — Medication 2 ML: at 10:09

## 2018-08-02 RX ADMIN — PROPOFOL 150 MCG/KG/MIN: 10 INJECTION, EMULSION INTRAVENOUS at 10:11

## 2018-08-02 RX ADMIN — MIDAZOLAM 2 MG: 1 INJECTION INTRAMUSCULAR; INTRAVENOUS at 09:58

## 2018-08-02 RX ADMIN — ONDANSETRON 4 MG: 2 INJECTION INTRAMUSCULAR; INTRAVENOUS at 10:10

## 2018-08-02 RX ADMIN — LIDOCAINE HYDROCHLORIDE 80 MG: 20 INJECTION, SOLUTION INFILTRATION; PERINEURAL at 10:10

## 2018-08-02 RX ADMIN — SODIUM CHLORIDE, POTASSIUM CHLORIDE, SODIUM LACTATE AND CALCIUM CHLORIDE: 600; 310; 30; 20 INJECTION, SOLUTION INTRAVENOUS at 09:58

## 2018-08-02 RX ADMIN — Medication 5 MG: at 10:25

## 2018-08-02 RX ADMIN — GLYCOPYRROLATE 0.2 MG: 0.2 INJECTION, SOLUTION INTRAMUSCULAR; INTRAVENOUS at 10:17

## 2018-08-02 ASSESSMENT — LIFESTYLE VARIABLES: TOBACCO_USE: 1

## 2018-08-02 NOTE — ANESTHESIA PREPROCEDURE EVALUATION
Anesthesia Evaluation     .             ROS/MED HX    ENT/Pulmonary:     (+)tobacco use, Intermittent asthma , . .    Neurologic: Comment: Previous spinal cord stimulator    (+)Spinal cord injury year sustained: 2009 level of injury: L5-S1     Cardiovascular:     (+) Dyslipidemia, hypertension----. : . . . :. . Previous cardiac testing date:results:date: results:ECG reviewed date:06/18/18 results:Sinus dieudonne date: results:          METS/Exercise Tolerance:  3 - Able to walk 1-2 blocks without stopping   Hematologic:         Musculoskeletal:         GI/Hepatic: Comment: Esophageal varices    (+) GERD type Alcoholic, liver disease,       Renal/Genitourinary:         Endo:     (+) type II DM .      Psychiatric:     (+) psychiatric history anxiety      Infectious Disease:         Malignancy:   (+) Malignancy History of Skin          Other:    (+) H/O Chronic Pain,                   Physical Exam  Normal systems: cardiovascular and pulmonary    Airway   Mallampati: II  TM distance: >3 FB  Neck ROM: full    Dental   (+) upper dentures and lower dentures    Cardiovascular       Pulmonary                     Anesthesia Plan      History & Physical Review  History and physical reviewed and following examination; no interval change.    ASA Status:  3 .    NPO Status:  > 8 hours    Plan for MAC with Intravenous induction. Maintenance will be TIVA.  Reason for MAC:  Deep or markedly invasive procedure (G8)  PONV prophylaxis:  Ondansetron (or other 5HT-3)       Postoperative Care  Postoperative pain management:  IV analgesics.      Consents  Anesthetic plan, risks, benefits and alternatives discussed with:  Patient..        Procedure: Procedure(s):  EGD - Wound Class: II-Clean Contaminated    HPI: Erwin Aguilar is a 57 year old male scheduled for EGD for h/o esophageal varices. PMH also significant for asthma, HTN, HPL, GERD, alcholic cirrhosis, SCC of skin, DMII, and JATIN.  Denies any history of anesthesia related  complications.  Plan for MAC, standard ASA monitors, single IV.    PMHx/PSHx:  Past Medical History:   Diagnosis Date     Actinic keratosis     aldara     Anxiety      Cancer (H)     squamous cell skin CA     Cauda equina spinal cord injury (H)      Chronic sinusitis 5-1-16     Diastasis recti      Esophageal reflux      Esophageal varices in cirrhosis (H) 4/1/2014    Hospitalized for UGI blee 3/28/14, endoscopy revealed bleeding varices.     Essential hypertension, benign      Intermittent asthma      Mild depression (H)      Mixed hyperlipidemia      Nasal polyps 5-1-16     Other chronic pain      SCCA (squamous cell carcinoma) of skin      Seasonal allergic conjunctivitis      Type II or unspecified type diabetes mellitus without mention of complication, not stated as uncontrolled      Unspecified site of spinal cord injury without evidence of spinal bone injury     due to back surgery       Past Surgical History:   Procedure Laterality Date     BACK SURGERY  August 2009     C APPENDECTOMY  1974     COLONOSCOPY N/A 5/12/2016    Procedure: COMBINED COLONOSCOPY, SINGLE OR MULTIPLE BIOPSY/POLYPECTOMY BY BIOPSY;  Surgeon: Ana Paula Urbina MD;  Location:  GI     ENDOSCOPY UPPER, COLONOSCOPY, COMBINED  10/19/2011    Procedure:COMBINED ENDOSCOPY UPPER, COLONOSCOPY; Upper Endoscopy, Colonoscopy with Polypectomy x4; Surgeon:AMBAR RODRÍGUEZ; Location:[facility] OR     ENT SURGERY  1-2016    Ongoing since then     ESOPHAGOSCOPY, GASTROSCOPY, DUODENOSCOPY (EGD), COMBINED  3/28/2014    Procedure: COMBINED ESOPHAGOSCOPY, GASTROSCOPY, DUODENOSCOPY (EGD);  EGD, Gastric Biopsies, Esophageal Banding;  Surgeon: Reyna Tovar MD;  Location:  OR     ESOPHAGOSCOPY, GASTROSCOPY, DUODENOSCOPY (EGD), COMBINED  6/9/2014    Procedure: COMBINED ESOPHAGOSCOPY, GASTROSCOPY, DUODENOSCOPY (EGD);  Surgeon: Curtis Mendez MD;  Location:  GI     ESOPHAGOSCOPY, GASTROSCOPY, DUODENOSCOPY (EGD), COMBINED  7/24/2014     Procedure: COMBINED ESOPHAGOSCOPY, GASTROSCOPY, DUODENOSCOPY (EGD);  Surgeon: Gerard Samaniego MD;  Location: [facility] OR     ESOPHAGOSCOPY, GASTROSCOPY, DUODENOSCOPY (EGD), COMBINED N/A 10/31/2014    Procedure: COMBINED ESOPHAGOSCOPY, GASTROSCOPY, DUODENOSCOPY (EGD);  Surgeon: Gerard Samaniego MD;  Location: [facility] OR     ESOPHAGOSCOPY, GASTROSCOPY, DUODENOSCOPY (EGD), COMBINED N/A 5/12/2016    Procedure: COMBINED ESOPHAGOSCOPY, GASTROSCOPY, DUODENOSCOPY (EGD);  Surgeon: Ana Paula Urbina MD;  Location: [facility] GI     HCL SQUAMOUS CELL CARCINOMA AG  10/07    left forearm     HERNIORRHAPHY UMBILICAL  11/8/2012    Procedure: HERNIORRHAPHY UMBILICAL;  Open Umbilical Hernia Repair With Mesh ;  Surgeon: Chase Nicholson MD;  Location: [facility] OR     INSERT STIMULATOR DORSAL COLUMN N/A 6/28/2018    Procedure: INSERT STIMULATOR DORSAL COLUMN;;  Surgeon: Elvis Sauceda MD;  Location:  OR     neuro stimulator  2010     REMOVE GENERATOR STIMULATOR (LOCATION) N/A 6/28/2018    Procedure: REMOVE GENERATOR STIMULATOR (LOCATION);  Spinal Cord Stimulator and IPG Explant and Re-Implant of SCS System (Leads and IPG);  Surgeon: Elvis Sauceda MD;  Location:  OR     SURGICAL HISTORY OF -   1/02    abcess tooth     SURGICAL HISTORY OF -   1999    L4-5 laminectomy, cauda equina syndrome     SURGICAL HISTORY OF -   +    herniated disk repair     TONSILLECTOMY  10 1964     TRANSPOSITION ULNAR NERVE (ELBOW)      right         No current facility-administered medications on file prior to encounter.   Current Outpatient Prescriptions on File Prior to Encounter:  albuterol (PROAIR HFA/PROVENTIL HFA/VENTOLIN HFA) 108 (90 Base) MCG/ACT Inhaler Inhale 2 puffs into the lungs every 6 hours as needed for shortness of breath / dyspnea or wheezing Ventolin or other covered brand   baclofen (LIORESAL) 10 MG tablet TAKE 2 TABLETS BY MOUTH THREE TIMES DAILY   BASAGLAR 100 UNIT/ML injection Inject 26  "Units Subcutaneous daily   hydrochlorothiazide (HYDRODIURIL) 25 MG tablet TAKE 1 TABLET (25 MG) BY MOUTH DAILY (Patient taking differently: AM)   promethazine (PHENERGAN) 25 MG tablet TAKE 1 TABLET (25 MG) BY MOUTH EVERY 6 HOURS AS NEEDED FOR NAUSEA   propranolol (INDERAL) 20 MG tablet TAKE 1 TABLET (20 MG) BY MOUTH 3 TIMES DAILY   propranolol (INDERAL) 40 MG tablet Take 1 tablet (40 mg) by mouth 2 times daily   sertraline (ZOLOFT) 100 MG tablet Take 2 tablets (200 mg) by mouth daily (Patient taking differently: Take 200 mg by mouth every morning )   ACE/ARB NOT PRESCRIBED, INTENTIONAL, ACE & ARB not prescribed due to Allergy   Alcohol Swabs (ALCOHOL PADS) 70 % PADS 1 Box 4 times daily   ammonium lactate (LAC-HYDRIN) 12 % cream Apply topically 2 times daily as needed for dry skin   BD ROSE U/F 32G X 4 MM insulin pen needle USE ONCE DAILY AS DIRECTED   blood glucose monitoring (NO BRAND SPECIFIED) test strip Use to test blood sugars 1 time daily. ACCU-CHEK KARISHMA BRAND PLEASE.   blood glucose monitoring (NO BRAND SPECIFIED) test strip Use to test blood sugars one time daily or as directedAccucheck Richards plus test strips   blood glucose monitoring (SOFTCLIX) lancets Use to test blood sugar 1 time daily or as directed.   Blood Glucose Monitoring Suppl (ACURA BLOOD GLUCOSE METER) W/DEVICE KIT 1 Dose 2 times daily   mupirocin (BACTROBAN) 2 % ointment Apply to anterior nares BID X 2 month supply (Patient taking differently: Apply topically as needed Apply to anterior nares BID X 2 month supply)   omeprazole (PRILOSEC) 40 MG capsule Take 1 capsule (40 mg) by mouth daily Take 30-60 minutes before a meal.   order for DME Equipment being ordered: wound care suppliesCONVATEC DUODERM  Extra Thin Dressing - 1 1/4\" x 1 1/2\", Spots, [id number] measurements3 cm x 3 cm x 0.1 cm lesions with wound area of 1.5 x 1.5 that has slight drainage, full thickness located on the right sacral region   order for DME   Location:sacral " ulcer unknown etiology possible sheer  measurement: 2.5 cm x 2 cm x 0.1 cm Unlikely to be a  pressure ulcer but if it is it's a stg 2,  partial thickness, low eexudateWound supplies :5 each Comfeel  Plus Transparent 3530 2 x 2 3/4''  5x7 cm  [facility] [id number] care orders: cleans wound, dry thoroughly  and replace Comfeel. Leave in place for 7 days for 5 weeks   order for DME Equipment being ordered: one donut for sitting (Patient not taking: Reported on 7/24/2018)   ORDER FOR DME Equipment being ordered: BP cuff   oxyCODONE IR (ROXICODONE) 10 MG tablet Take 1 tablet (10 mg) by mouth every 6 hours as needed for moderate to severe pain   Syringe, Disposable, 25 ML MISC Syringe to be used for nasal irrigation       Social Hx:   Social History   Substance Use Topics     Smoking status: Former Smoker     Packs/day: 0.50     Years: 1.00     Types: Cigarettes     Start date: 10/13/1983     Quit date: 6/9/1984     Smokeless tobacco: Never Used     Alcohol use No      Comment: a pint of alcohol / day - last drink was 3/28/14       Allergies:   Allergies   Allergen Reactions     Lisinopril Anaphylaxis     Swollen tongue; inability to swallow; drooling; hives; swollen face, neck, angioedema     Acetaminophen      Hx of cirrhosis      Amlodipine      Swelling, hives, possible angioedema       Morphine      b/p dropped and arms went numb  Hypotension     Quinolones Hives     Bactrim [Sulfamethoxazole W/Trimethoprim] Rash     Levaquin Swelling and Rash     Swelling in lip and tongue felt thick         NPO Status: Per ASA Guidelines    Labs:    Blood Bank:  Lab Results   Component Value Date    ABO A 06/30/2018    RH Pos 06/30/2018    AS Neg 06/30/2018     BMP:  Recent Labs   Lab Test  07/11/18   0707   NA  140   POTASSIUM  3.6   CHLORIDE  106   CO2  26   BUN  10   CR  0.65*   GLC  126*   [place]  8.5     CBC:   Recent Labs   Lab Test  07/11/18   0707   WBC  6.4   RBC  4.12*   HGB  11.1*   HCT  34.0*   MCV  83   MCH  26.9   MCHC  32.6    RDW  15.0   PLT  188     Coags:  Recent Labs   Lab Test  07/11/18   0707   04/22/14 2037   INR  1.03   < >  1.21*   PTT   --    --   36    < > = values in this interval not displayed.       Gustavo Odonnell MD  Staff Anesthesiologist  [phone]

## 2018-08-02 NOTE — ANESTHESIA POSTPROCEDURE EVALUATION
Patient: Erwin Aguilar    Procedure(s):  EGD - Wound Class: II-Clean Contaminated    Diagnosis:Alcoholic cirrhosis of liver without ascites ,Gastrointestinal hemorrhage with hematemesis -(Per Marjorie in Transplant Dr Ramirez wanted this EGD w/ him @ the [facility] [place])- Prep mailed  Diagnosis Additional Information: No value filed.    Anesthesia Type:  MAC    Note:  Anesthesia Post Evaluation    Patient location during evaluation: PACU  Patient participation: Able to fully participate in evaluation  Level of consciousness: awake and alert  Pain management: adequate  Airway patency: patent  Cardiovascular status: acceptable  Respiratory status: acceptable  Hydration status: acceptable  PONV: none             Last vitals:  Vitals:    08/02/18 0840 08/02/18 1046   BP: 125/73    Pulse: 55    Resp: 20    Temp: 36.8  C (98.2  F) 36.7  C (98.1  F)   SpO2: 98%          Electronically Signed By: Gustavo Odonnell MD  August 2, 2018  10:50 AM

## 2018-08-02 NOTE — OR NURSING
Procedure: EGD /s intervention.  Sedation: Anesthesia Assist Sedation (MAC)  Specimens: None.  O2: See Anesthesia Record.   Note: Pt tolerated procedure well.   Transport: Pt transferred to Endo Post-procedure via cart. Siderails elevated westley. Accompanied by CRNA.  Report:  Bedside report given upon completion of transport to post-procedure room.  Total sedation time: See Anesthesia Record.     Additional Notes: Additional pillows used for support owing pt's recent back surgery et chronic back pain.     Vanessa Munoz RN  [facility]

## 2018-08-02 NOTE — ANESTHESIA CARE TRANSFER NOTE
Patient: Erwin Aguilar    Procedure(s):  EGD - Wound Class: II-Clean Contaminated    Diagnosis: Alcoholic cirrhosis of liver without ascites ,Gastrointestinal hemorrhage with hematemesis -(Per Marjorie in Transplant Dr Ramirez wanted this EGD w/ him @ the [facility] [place])- Prep mailed  Diagnosis Additional Information: No value filed.    Anesthesia Type:   MAC     Note:  Airway :Room Air  Patient transferred to:Phase II  Comments: Pt recovering in phase II from his mac anesthetic. Pt VSS. Pt has no c/o pain/N/V. Pt care report given to receiving RN.       Vitals: (Last set prior to Anesthesia Care Transfer)    CRNA VITALS  8/2/2018 1002 - 8/2/2018 1036      8/2/2018             Ht Rate: 58                Electronically Signed By: VANCE Lieberman CRNA  August 2, 2018  10:36 AM

## 2018-08-02 NOTE — DISCHARGE INSTRUCTIONS
[facility] Upper Endoscopy (EGD) with Monitored Anesthesia Care  For 24 hours after your procedure  Sedation:  1. Get plenty of rest. A responsible adult must stay with you for at least 24 hours after you leave the hospital.   2. Do not drive or use heavy equipment. If you have weakness or tingling, don't drive or use heavy equipment until this feeling goes away.  3. Do not drink alcohol.  4. Avoid strenuous or risky activities. Ask for help when climbing stairs.   5. You may feel lightheaded. IF so, sit for a few minutes before standing. Have someone help you get up.   6. If you have nausea (feel sick to your stomach): Drink only clear liquids such as apple juice, ginger ale, broth or 7-Up. Rest may also help. Be sure to drink enough fluids. Move to a regular diet as you feel able.  7. You may have a slight fever. Call the doctor if your fever is over 100 F (37.7 C) (taken under the tongue) or lasts longer than 24 hours.  8. You may have a dry mouth, a sore throat, muscle aches or trouble sleeping. These should go away after 24 hours.  9. Do not make important or legal decisions.   Procedural:  1. Wait one hour before eating or drinking. Start with sips of water. When your gag reflex has returned, you may return to your normal diet, medicines, and light exercise.  2. Some bloating is normal. You may have large burps or pass air.  3. You may have a sore throat for 2 to 3 days. If so, it may help to:    Avoid hot liquids for 24 hours.    Use sore throat lozenges.    Gargle as need with salt water up to 4 times a day. Mix 1 cup of warm water with 1 teaspoon of salt. Do not swallow.    Call right away if you have:  1. Unusual pain in belly or chest pain not relieved with passing air.  2. Severe throat pain or trouble swallowing.  3. Black stools (tar-like looking bowel movement).  4. Signs of infection (fever, growing tenderness at the surgery site, a large amount of drainage or bleeding, severe pain, foul-smelling drainage,  redness, swelling).  5. It has been over 8 to 10 hours since surgery and you are still not able to urinate (pass water).  6. Headache for over 24 hours.  7. Numbness, tingling or weakness the day after surgery (if you had spinal anesthesia).  Follow-up:  Per your physician.    If you have severe pain, bleeding, vomit blood, or shortness of breath, go to an emergency room.  If you have questions, call:  Endoscopy: Monday to Friday, 7 a.m. to 4:30 p.m. [phone] (We may have to call you back)  After hours: Hospital  [phone] (Ask for the GI fellow on call)

## 2018-08-02 NOTE — IP AVS SNAPSHOT
[facility], [place], Endoscopy    [address] [address]    Phone:  [phone]                                       After Visit Summary   8/2/2018    Erwin Aguilar    MRN: [id number]           After Visit Summary Signature Page     I have received my discharge instructions, and my questions have been answered. I have discussed any challenges I see with this plan with the nurse or doctor.    ..........................................................................................................................................  Patient/Patient Representative Signature      ..........................................................................................................................................  Patient Representative Print Name and Relationship to Patient    ..................................................               ................................................  Date                                            Time    ..........................................................................................................................................  Reviewed by Signature/Title    ...................................................              ..............................................  Date                                                            Time

## 2018-08-02 NOTE — IP AVS SNAPSHOT
MRN:[id number]                      After Visit Summary   8/2/2018    Erwin Aguilar    MRN: [id number]           Thank you!     Thank you for choosing [facility] for your care. Our goal is always to provide you with excellent care. Hearing back from our patients is one way we can continue to improve our services. Please take a few minutes to complete the written survey that you may receive in the mail after you visit with us. Thank you!        Patient Information     Date Of Birth          1960        About your hospital stay     You were admitted on:  August 2, 2018 You last received care in the:  [facility], Endoscopy    You were discharged on:  August 2, 2018       Who to Call     For medical emergencies, please call 911.  For non-urgent questions about your medical care, please call your primary care provider or clinic, [phone]  For questions related to your surgery, please call your surgery clinic        Attending Provider     Provider Yu Staples MD Internal Medicine       Primary Care Provider Office Phone # Fax #    Joel Daniel Irwin Wegener, [phone]      Your next 10 appointments already scheduled     Sep 04, 2018  9:20 AM CDT   MyChart Long with Joel Daniel Irwin Wegener, MD   [facility]    [address] [place] [id number] [phone]            Oct 09, 2018  8:40 AM CDT   MyChart Short with Joel Daniel Irwin Wegener, MD   [facility]    [address] [place] [id number] [phone]              Further instructions from your care team       [facility] Upper Endoscopy (EGD) with Monitored Anesthesia Care  For 24 hours after your procedure  Sedation:  1. Get plenty of rest. A responsible adult must stay with you for at least 24 hours after you leave the hospital.   2. Do not drive or use heavy equipment. If  you have weakness or tingling, don't drive or use heavy equipment until this feeling goes away.  3. Do not drink alcohol.  4. Avoid strenuous or risky activities. Ask for help when climbing stairs.   5. You may feel lightheaded. IF so, sit for a few minutes before standing. Have someone help you get up.   6. If you have nausea (feel sick to your stomach): Drink only clear liquids such as apple juice, ginger ale, broth or 7-Up. Rest may also help. Be sure to drink enough fluids. Move to a regular diet as you feel able.  7. You may have a slight fever. Call the doctor if your fever is over 100 F (37.7 C) (taken under the tongue) or lasts longer than 24 hours.  8. You may have a dry mouth, a sore throat, muscle aches or trouble sleeping. These should go away after 24 hours.  9. Do not make important or legal decisions.   Procedural:  1. Wait one hour before eating or drinking. Start with sips of water. When your gag reflex has returned, you may return to your normal diet, medicines, and light exercise.  2. Some bloating is normal. You may have large burps or pass air.  3. You may have a sore throat for 2 to 3 days. If so, it may help to:    Avoid hot liquids for 24 hours.    Use sore throat lozenges.    Gargle as need with salt water up to 4 times a day. Mix 1 cup of warm water with 1 teaspoon of salt. Do not swallow.    Call right away if you have:  1. Unusual pain in belly or chest pain not relieved with passing air.  2. Severe throat pain or trouble swallowing.  3. Black stools (tar-like looking bowel movement).  4. Signs of infection (fever, growing tenderness at the surgery site, a large amount of drainage or bleeding, severe pain, foul-smelling drainage, redness, swelling).  5. It has been over 8 to 10 hours since surgery and you are still not able to urinate (pass water).  6. Headache for over 24 hours.  7. Numbness, tingling or weakness the day after surgery (if you had spinal anesthesia).  Follow-up:  Per your  physician.    If you have severe pain, bleeding, vomit blood, or shortness of breath, go to an emergency room.  If you have questions, call:  Endoscopy: Monday to Friday, 7 a.m. to 4:30 p.m. [phone] (We may have to call you back)  After hours: Hospital  [phone] (Ask for the GI fellow on call)              Pending Results     No orders found from 7/31/2018 to 8/3/2018.            Admission Information     Date & Time Provider Department Dept. Phone    8/2/2018 Yu Wagner MD [facility] [place], Endoscopy [phone]      Your Vitals Were     Blood Pressure Pulse Temperature Respirations Weight Pulse Oximetry    125/73 55 98.1  F (36.7  C) (Oral) 20 99.8 kg (220 lb) 98%    BMI (Body Mass Index)                   30.68 kg/m2           MyChart Information     "[employer] gives you secure access to your electronic health record. If you see a primary care provider, you can also send messages to your care team and make appointments. If you have questions, please call your primary care clinic.  If you do not have a primary care provider, please call [phone] and they will assist you.        Care EveryWhere ID     This is your Care EveryWhere ID. This could be used by other organizations to access your [facility] medical records  [id number]        Equal Access to Services     LI GENAO : Hadii aad ku hadasho Somigdaliaali, waaxda luqadaha, qaybta kaalmada delmy, donis fallon. So [facility] [phone].    ATENCIÓN: Si habla español, tiene a camp disposición servicios gratuitos de asistencia lingüística. Llame al [phone].    We comply with applicable federal civil rights laws and [place] laws. We do not discriminate on the basis of race, color, national origin, age, disability, sex, sexual orientation, or gender identity.               Review of your medicines      UNREVIEWED medicines. Ask your doctor about these medicines        Dose / Directions    *  ACE/ARB/ARNI NOT PRESCRIBED (INTENTIONAL)        ACE & ARB not prescribed due to Allergy   Refills:  0       albuterol 108 (90 Base) MCG/ACT Inhaler   Commonly known as:  PROAIR HFA/PROVENTIL HFA/VENTOLIN HFA   Used for:  Moderate persistent asthma without complication        Dose:  2 puff   Inhale 2 puffs into the lungs every 6 hours as needed for shortness of breath / dyspnea or wheezing Ventolin or other covered brand   Quantity:  3 Inhaler   Refills:  3       ammonium lactate 12 % cream   Commonly known as:  LAC-HYDRIN   Used for:  Xerosis cutis        Apply topically 2 times daily as needed for dry skin   Quantity:  385 g   Refills:  3       baclofen 10 MG tablet   Commonly known as:  LIORESAL   Used for:  Muscle spasms of both lower extremities, Back muscle spasm        TAKE 2 TABLETS BY MOUTH THREE TIMES DAILY   Quantity:  180 tablet   Refills:  11       BASAGLAR 100 UNIT/ML injection   Used for:  Type 2 diabetes mellitus without complication, with long-term current use of insulin (H)        Dose:  26 Units   Inject 26 Units Subcutaneous daily   Quantity:  30 mL   Refills:  11       hydrochlorothiazide 25 MG tablet   Commonly known as:  HYDRODIURIL   Used for:  Hypertension goal BP (blood pressure) < 140/90        TAKE 1 TABLET (25 MG) BY MOUTH DAILY   Quantity:  90 tablet   Refills:  3       mupirocin 2 % ointment   Commonly known as:  BACTROBAN   Used for:  Nasal lesion, Nasal vestibulitis        Apply to anterior nares BID X 2 month supply   Quantity:  2 g   Refills:  3       omeprazole 40 MG capsule   Commonly known as:  priLOSEC   Used for:  Gastroesophageal reflux disease without esophagitis        Dose:  40 mg   Take 1 capsule (40 mg) by mouth daily Take 30-60 minutes before a meal.   Quantity:  90 capsule   Refills:  3       oxyCODONE IR 10 MG tablet   Commonly known as:  ROXICODONE   Used for:  Post laminectomy syndrome        Dose:  10 mg   Take 1 tablet (10 mg) by mouth every 6 hours as needed for  moderate to severe pain   Quantity:  40 tablet   Refills:  0       promethazine 25 MG tablet   Commonly known as:  PHENERGAN   Used for:  Nausea without vomiting        TAKE 1 TABLET (25 MG) BY MOUTH EVERY 6 HOURS AS NEEDED FOR NAUSEA   Quantity:  40 tablet   Refills:  11       * propranolol 20 MG tablet   Commonly known as:  INDERAL   Used for:  Esophageal varices in cirrhosis (H)        TAKE 1 TABLET (20 MG) BY MOUTH 3 TIMES DAILY   Quantity:  90 tablet   Refills:  0       * propranolol 40 MG tablet   Commonly known as:  INDERAL   Used for:  Hypertension goal BP (blood pressure) < 140/90        Dose:  40 mg   Take 1 tablet (40 mg) by mouth 2 times daily   Quantity:  180 tablet   Refills:  1       sertraline 100 MG tablet   Commonly known as:  ZOLOFT   Used for:  Generalized anxiety disorder        Dose:  200 mg   Take 2 tablets (200 mg) by mouth daily   Quantity:  180 tablet   Refills:  1       * Notice:  This list has 3 medication(s) that are the same as other medications prescribed for you. Read the directions carefully, and ask your doctor or other care provider to review them with you.      CONTINUE these medicines which have NOT CHANGED        Dose / Directions    ACURA BLOOD GLUCOSE METER w/Device Kit   Used for:  Type 2 diabetes, HbA1c goal < 7% (H)        Dose:  1 Dose   1 Dose 2 times daily   Quantity:  1 kit   Refills:  1       Alcohol Pads 70 % Pads   Used for:  Type 2 diabetes, HbA1c goal < 7% (H)        Dose:  1 Box   1 Box 4 times daily   Quantity:  100 each   Refills:  3       BD ROSE U/F 32G X 4 MM   Used for:  Type 2 diabetes mellitus without complication, with long-term current use of insulin (H)   Generic drug:  insulin pen needle        USE ONCE DAILY AS DIRECTED   Quantity:  100 each   Refills:  11       blood glucose monitoring lancets   Used for:  Type 2 diabetes, HbA1c goal < 7% (H)        Use to test blood sugar 1 time daily or as directed.   Quantity:  100 each   Refills:  3       * blood  "glucose monitoring test strip   Commonly known as:  no brand specified   Used for:  Type 2 diabetes, HbA1c goal < 7% (H)        Use to test blood sugars one time daily or as directed  Accucheck Richards plus test strips   Quantity:  1 Box   Refills:  5       * blood glucose monitoring test strip   Commonly known as:  no brand specified   Used for:  Type 2 diabetes mellitus with diabetic polyneuropathy, with long-term current use of insulin (H)        Use to test blood sugars 1 time daily. ACCU-CHEK KARISHMA BRAND PLEASE.   Quantity:  100 strip   Refills:  3       * order for DME   Used for:  Hypertension goal BP (blood pressure) < 140/90        Equipment being ordered: BP cuff   Quantity:  1 Device   Refills:  0       * order for DME   Used for:  Pressure ulcer, stage II        Equipment being ordered: one donut for sitting   Quantity:  1 Device   Refills:  0       * order for DME   Used for:  Wound, open, buttock, right, initial encounter        ?Location:sacral ulcer unknown etiology possible sheer ?measurement: 2.5 cm x 2 cm x 0.1 cm Unlikely to be a  pressure ulcer but if it is it's a stg 2,  partial thickness, low eexudate  Wound supplies : 5 each Comfeel? Plus [id number] x 2 3/4''  5x7 cm  [facility]   wound care orders: cleans wound, dry thoroughly  and replace Comfeel. Leave in place for 7 days for 5 weeks   Quantity:  5 each   Refills:  3       * order for DME   Used for:  Benign neoplasm of skin of trunk, except scrotum        Equipment being ordered: wound care supplies CONVATEC DUODERM  Extra Thin Dressing - 1 1/4\" x 1 1/2\", Spots, oval [id number] Wound measurements 3 cm x 3 cm x 0.1 cm lesions with wound area of 1.5 x 1.5 that has slight drainage, full thickness located on the right sacral region   Quantity:  1 Box   Refills:  3       Syringe (Disposable) 25 ML Misc   Used for:  Chronic maxillary sinusitis        Syringe to be used for nasal irrigation   Quantity:  1 each   Refills:  3       * Notice:  " This list has 6 medication(s) that are the same as other medications prescribed for you. Read the directions carefully, and ask your doctor or other care provider to review them with you.             Protect others around you: Learn how to safely use, store and throw away your medicines at www.disposemymeds.org.             Medication List: This is a list of all your medications and when to take them. Check marks below indicate your daily home schedule. Keep this list as a reference.      Medications           Morning Afternoon Evening Bedtime As Needed    * ACE/ARB/ARNI NOT PRESCRIBED (INTENTIONAL)   ACE & ARB not prescribed due to Allergy                                ACURA BLOOD GLUCOSE METER w/Device Kit   1 Dose 2 times daily                                albuterol 108 (90 Base) MCG/ACT Inhaler   Commonly known as:  PROAIR HFA/PROVENTIL HFA/VENTOLIN HFA   Inhale 2 puffs into the lungs every 6 hours as needed for shortness of breath / dyspnea or wheezing Ventolin or other covered brand                                Alcohol Pads 70 % Pads   1 Box 4 times daily                                ammonium lactate 12 % cream   Commonly known as:  LAC-HYDRIN   Apply topically 2 times daily as needed for dry skin                                baclofen 10 MG tablet   Commonly known as:  LIORESAL   TAKE 2 TABLETS BY MOUTH THREE TIMES DAILY                                BASAGLAR 100 UNIT/ML injection   Inject 26 Units Subcutaneous daily                                BD ROSE U/F 32G X 4 MM   USE ONCE DAILY AS DIRECTED   Generic drug:  insulin pen needle                                blood glucose monitoring lancets   Use to test blood sugar 1 time daily or as directed.                                * blood glucose monitoring test strip   Commonly known as:  no brand specified   Use to test blood sugars one time daily or as directed  Accucheck Richards plus test strips                                * blood glucose  "monitoring test strip   Commonly known as:  no brand specified   Use to test blood sugars 1 time daily. ACCU-CHEK KARISHMA BRAND PLEASE.                                hydrochlorothiazide 25 MG tablet   Commonly known as:  HYDRODIURIL   TAKE 1 TABLET (25 MG) BY MOUTH DAILY                                mupirocin 2 % ointment   Commonly known as:  BACTROBAN   Apply to anterior nares BID X 2 month supply                                omeprazole 40 MG capsule   Commonly known as:  priLOSEC   Take 1 capsule (40 mg) by mouth daily Take 30-60 minutes before a meal.                                * order for DME   Equipment being ordered: BP cuff                                * order for DME   Equipment being ordered: one donut for sitting                                * order for DME   ?Location:sacral ulcer unknown etiology possible sheer ?measurement: 2.5 cm x 2 cm x 0.1 cm Unlikely to be a  pressure ulcer but if it is it's a stg 2,  partial thickness, low eexudate  Wound supplies : 5 each Comfeel? Plus [id number] x 2 3/4''  5x7 cm  formerly [facility]   wound care orders: cleans wound, dry thoroughly  and replace Comfeel. Leave in place for 7 days for 5 weeks                                * order for DME   Equipment being ordered: wound care supplies CONVATEC DUODERM  Extra Thin Dressing - 1 1/4\" x 1 1/2\", Spots, oval [id number] Wound measurements 3 cm x 3 cm x 0.1 cm lesions with wound area of 1.5 x 1.5 that has slight drainage, full thickness located on the right sacral region                                oxyCODONE IR 10 MG tablet   Commonly known as:  ROXICODONE   Take 1 tablet (10 mg) by mouth every 6 hours as needed for moderate to severe pain                                promethazine 25 MG tablet   Commonly known as:  PHENERGAN   TAKE 1 TABLET (25 MG) BY MOUTH EVERY 6 HOURS AS NEEDED FOR NAUSEA                                * propranolol 20 MG tablet   Commonly known as:  INDERAL   TAKE 1 TABLET (20 MG) BY " MOUTH 3 TIMES DAILY                                * propranolol 40 MG tablet   Commonly known as:  INDERAL   Take 1 tablet (40 mg) by mouth 2 times daily                                sertraline 100 MG tablet   Commonly known as:  ZOLOFT   Take 2 tablets (200 mg) by mouth daily                                Syringe (Disposable) 25 ML Misc   Syringe to be used for nasal irrigation                                * Notice:  This list has 9 medication(s) that are the same as other medications prescribed for you. Read the directions carefully, and ask your doctor or other care provider to review them with you.

## 2018-09-10 ENCOUNTER — RADIANT APPOINTMENT (OUTPATIENT)
Dept: ULTRASOUND IMAGING | Facility: CLINIC | Age: 58
End: 2018-09-10
Attending: INTERNAL MEDICINE
Payer: MEDICARE

## 2018-09-10 DIAGNOSIS — K92.0 GASTROINTESTINAL HEMORRHAGE WITH HEMATEMESIS: ICD-10-CM

## 2018-09-10 DIAGNOSIS — K70.30 ALCOHOLIC CIRRHOSIS OF LIVER WITHOUT ASCITES (H): ICD-10-CM

## 2018-09-17 ENCOUNTER — OFFICE VISIT (OUTPATIENT)
Dept: FAMILY MEDICINE | Facility: CLINIC | Age: 58
End: 2018-09-17
Payer: MEDICARE

## 2018-09-17 VITALS
TEMPERATURE: 97.7 F | WEIGHT: 224.5 LBS | SYSTOLIC BLOOD PRESSURE: 135 MMHG | BODY MASS INDEX: 31.31 KG/M2 | RESPIRATION RATE: 16 BRPM | HEART RATE: 50 BPM | DIASTOLIC BLOOD PRESSURE: 82 MMHG | OXYGEN SATURATION: 98 %

## 2018-09-17 DIAGNOSIS — E11.9 TYPE 2 DIABETES MELLITUS WITHOUT COMPLICATION, WITH LONG-TERM CURRENT USE OF INSULIN (H): ICD-10-CM

## 2018-09-17 DIAGNOSIS — Z96.89 S/P INSERTION OF SPINAL CORD STIMULATOR: ICD-10-CM

## 2018-09-17 DIAGNOSIS — K70.30 ALCOHOLIC CIRRHOSIS OF LIVER WITHOUT ASCITES (H): ICD-10-CM

## 2018-09-17 DIAGNOSIS — Z23 NEED FOR PROPHYLACTIC VACCINATION AND INOCULATION AGAINST INFLUENZA: Primary | ICD-10-CM

## 2018-09-17 DIAGNOSIS — M47.816 OSTEOARTHRITIS OF LUMBAR SPINE, UNSPECIFIED SPINAL OSTEOARTHRITIS COMPLICATION STATUS: ICD-10-CM

## 2018-09-17 DIAGNOSIS — I10 HYPERTENSION GOAL BP (BLOOD PRESSURE) < 140/90: ICD-10-CM

## 2018-09-17 DIAGNOSIS — Z79.4 TYPE 2 DIABETES MELLITUS WITHOUT COMPLICATION, WITH LONG-TERM CURRENT USE OF INSULIN (H): ICD-10-CM

## 2018-09-17 PROCEDURE — 99213 OFFICE O/P EST LOW 20 MIN: CPT | Mod: 25 | Performed by: FAMILY MEDICINE

## 2018-09-17 PROCEDURE — G0008 ADMIN INFLUENZA VIRUS VAC: HCPCS | Performed by: FAMILY MEDICINE

## 2018-09-17 PROCEDURE — 90682 RIV4 VACC RECOMBINANT DNA IM: CPT | Performed by: FAMILY MEDICINE

## 2018-09-17 RX ORDER — PROPRANOLOL HYDROCHLORIDE 60 MG/1
60 TABLET ORAL 2 TIMES DAILY
Qty: 180 TABLET | Refills: 3 | Status: SHIPPED | OUTPATIENT
Start: 2018-09-17 | End: 2018-10-09

## 2018-09-17 ASSESSMENT — ANXIETY QUESTIONNAIRES
7. FEELING AFRAID AS IF SOMETHING AWFUL MIGHT HAPPEN: NOT AT ALL
6. BECOMING EASILY ANNOYED OR IRRITABLE: NOT AT ALL
2. NOT BEING ABLE TO STOP OR CONTROL WORRYING: NOT AT ALL
5. BEING SO RESTLESS THAT IT IS HARD TO SIT STILL: NOT AT ALL
1. FEELING NERVOUS, ANXIOUS, OR ON EDGE: NOT AT ALL
GAD7 TOTAL SCORE: 0
3. WORRYING TOO MUCH ABOUT DIFFERENT THINGS: NOT AT ALL
IF YOU CHECKED OFF ANY PROBLEMS ON THIS QUESTIONNAIRE, HOW DIFFICULT HAVE THESE PROBLEMS MADE IT FOR YOU TO DO YOUR WORK, TAKE CARE OF THINGS AT HOME, OR GET ALONG WITH OTHER PEOPLE: NOT DIFFICULT AT ALL

## 2018-09-17 ASSESSMENT — PATIENT HEALTH QUESTIONNAIRE - PHQ9: 5. POOR APPETITE OR OVEREATING: NOT AT ALL

## 2018-09-17 NOTE — PROGRESS NOTES
SUBJECTIVE:   Erwin Aguilar is a 57 year old male who presents to clinic today for the following health issues:      Hyperlipidemia Follow-Up      Rate your low fat/cholesterol diet?: not monitoring fat    Taking statin?  No    Other lipid medications/supplements?:  none    Hypertension Follow-up      Outpatient blood pressures are being checked at home couple weeks  Last time checked 116/65    Low Salt Diet: not monitoring salt      Amount of exercise or physical activity: walking     Problems taking medications regularly: No    Medication side effects: none    Diet: regular (no restrictions)             Hypertension goal BP (blood pressure) < 140/90: on propranolol for this and varices.  Going well.  No wheezing from it.   Type 2 diabetes mellitus without complication, with long-term current use of insulin (H): on basaglar now.  No hypoglycemia.  Recent a1c 6.2!   Alcoholic cirrhosis of liver without ascites (H); continues sobriety, MELD score/cirrhosis improving per his report.  Having q 6 month ultrasounds.    Osteoarthritis of lumbar spine, unspecified spinal osteoarthritis complication status: with failed spinal stimulator and sequelae over past 10 years.  Removed/replaced and drastically improved!  Pain nearly gone.  No longer using opiods.  Urine function returning and almost back to normal!   S/P insertion of spinal cord stimulator: as above.   Need for prophylactic vaccination and inoculation against influenza :today.         Problem list, Medication list, Allergies, and Medical/Social/Surgical histories reviewed in [facility] and updated as appropriate.  Labs reviewed in [facility]  BP Readings from Last 3 Encounters:   09/17/18 135/82   08/02/18 133/73   07/27/18 153/78    Wt Readings from Last 3 Encounters:   09/17/18 224 lb 8 oz (101.8 kg)   08/02/18 220 lb (99.8 kg)   07/27/18 211 lb (95.7 kg)                  Patient Active Problem List   Diagnosis     Generalized anxiety disorder     Type 2 diabetes, HbA1c  goal < 7% (H)     Hyperlipidemia LDL goal <100     Hypertension goal BP (blood pressure) < 140/90     Major depressive disorder, recurrent episode, mild (H)     Chronic pain syndrome     GERD (gastroesophageal reflux disease)     Abnormal liver function tests     Health Care Home     Abnormal EMG     Hernia     Prolonged QT interval     Diastasis recti     Hypokalemia     Cauda equina syndrome with neurogenic bladder (H)     Wound infection     Cellulitis     Nausea without vomiting     Atopic dermatitis     Muscle spasms of Upper extremity     Edema of left lower extremity     Esophageal varices in cirrhosis (H)     Cirrhosis of liver (H)     Anemia due to blood loss, acute     Skin infection     Superficial thrombophlebitis of arm     Cellulitis of hand     Ganglion cyst     Dry skin dermatitis     Moderate persistent asthma     Open wound of right heel     Fall     Closed fracture of right patella     Right knee pain     Alcoholic cirrhosis of liver without ascites (H)     Lumbosacral radiculopathy     Calculus of gallbladder without cholecystitis without obstruction     Type 2 diabetes mellitus without complication, with long-term current use of insulin (H)     Wound, open, buttock, right     Type 2 diabetes mellitus with diabetic polyneuropathy, with long-term current use of insulin (H)     Benign neoplasm of skin of trunk, except scrotum     Hematemesis with nausea     Osteoarthritis of lumbar spine, unspecified spinal osteoarthritis complication status     S/P insertion of spinal cord stimulator     Past Surgical History:   Procedure Laterality Date     BACK SURGERY  August 2009     C APPENDECTOMY  1974     COLONOSCOPY N/A 5/12/2016    Procedure: COMBINED COLONOSCOPY, SINGLE OR MULTIPLE BIOPSY/POLYPECTOMY BY BIOPSY;  Surgeon: Ana Paula Urbina MD;  Location:  GI     ENDOSCOPY UPPER, COLONOSCOPY, COMBINED  10/19/2011    Procedure:COMBINED ENDOSCOPY UPPER, COLONOSCOPY; Upper Endoscopy, Colonoscopy  with Polypectomy x4; Surgeon:AMBAR RODRÍGUEZ; Location:[facility] OR     ENT SURGERY  1-2016    Ongoing since then     ESOPHAGOSCOPY, GASTROSCOPY, DUODENOSCOPY (EGD), COMBINED  3/28/2014    Procedure: COMBINED ESOPHAGOSCOPY, GASTROSCOPY, DUODENOSCOPY (EGD);  EGD, Gastric Biopsies, Esophageal Banding;  Surgeon: Reyna Tovar MD;  Location: [facility]     ESOPHAGOSCOPY, GASTROSCOPY, DUODENOSCOPY (EGD), COMBINED  6/9/2014    Procedure: COMBINED ESOPHAGOSCOPY, GASTROSCOPY, DUODENOSCOPY (EGD);  Surgeon: Curtis Mendez MD;  Location: [facility] GI     ESOPHAGOSCOPY, GASTROSCOPY, DUODENOSCOPY (EGD), COMBINED  7/24/2014    Procedure: COMBINED ESOPHAGOSCOPY, GASTROSCOPY, DUODENOSCOPY (EGD);  Surgeon: Gerard Samaniego MD;  Location: [facility]     ESOPHAGOSCOPY, GASTROSCOPY, DUODENOSCOPY (EGD), COMBINED N/A 10/31/2014    Procedure: COMBINED ESOPHAGOSCOPY, GASTROSCOPY, DUODENOSCOPY (EGD);  Surgeon: Gerard Samaniego MD;  Location: [facility]     ESOPHAGOSCOPY, GASTROSCOPY, DUODENOSCOPY (EGD), COMBINED N/A 5/12/2016    Procedure: COMBINED ESOPHAGOSCOPY, GASTROSCOPY, DUODENOSCOPY (EGD);  Surgeon: Ana Paula Urbina MD;  Location: [facility]     ESOPHAGOSCOPY, GASTROSCOPY, DUODENOSCOPY (EGD), COMBINED N/A 8/2/2018    Procedure: COMBINED ESOPHAGOSCOPY, GASTROSCOPY, DUODENOSCOPY (EGD);  EGD;  Surgeon: Yu Wagner MD;  Location: [facility]     HCL SQUAMOUS CELL CARCINOMA AG  10/07    left forearm     HERNIORRHAPHY UMBILICAL  11/8/2012    Procedure: HERNIORRHAPHY UMBILICAL;  Open Umbilical Hernia Repair With Mesh ;  Surgeon: Chase Nicholson MD;  Location: [facility]     INSERT STIMULATOR DORSAL COLUMN N/A 6/28/2018    Procedure: INSERT STIMULATOR DORSAL COLUMN;;  Surgeon: Elvis Sauceda MD;  [facility]     neuro stimulator  2010     REMOVE GENERATOR STIMULATOR (LOCATION) N/A 6/28/2018    Procedure: REMOVE GENERATOR STIMULATOR (LOCATION);  Spinal Cord Stimulator and IPG Explant and Re-Implant of SCS  System (Leads and IPG);  Surgeon: Elvis Sauceda MD;  Location: [facility]     SURGICAL HISTORY OF -   1/02    abcess tooth     SURGICAL HISTORY OF -   1999    L4-5 laminectomy, cauda equina syndrome     SURGICAL HISTORY OF -   +    herniated disk repair     TONSILLECTOMY  10 1964     TRANSPOSITION ULNAR NERVE (ELBOW)      right       Social History   Substance Use Topics     Smoking status: Former Smoker     Packs/day: 0.50     Years: 1.00     Types: Cigarettes     Start date: 10/13/1983     Quit date: 6/9/1984     Smokeless tobacco: Never Used     Alcohol use No      Comment: a pint of alcohol / day - last drink was 3/28/14     Family History   Problem Relation Age of Onset     Cancer Mother      lung     Breast Cancer Mother      Other Cancer Mother      Cancer Father      esophogeal, GERD     Diabetes Brother      amputation, Type 1     Diabetes Brother      Diabetes Brother      Cancer - colorectal No family hx of          Current Outpatient Prescriptions   Medication Sig Dispense Refill     ACE/ARB NOT PRESCRIBED, INTENTIONAL, ACE & ARB not prescribed due to Allergy       albuterol (PROAIR HFA/PROVENTIL HFA/VENTOLIN HFA) 108 (90 Base) MCG/ACT Inhaler Inhale 2 puffs into the lungs every 6 hours as needed for shortness of breath / dyspnea or wheezing Ventolin or other covered brand 3 Inhaler 3     Alcohol Swabs (ALCOHOL PADS) 70 % PADS 1 Box 4 times daily 100 each 3     ammonium lactate (LAC-HYDRIN) 12 % cream Apply topically 2 times daily as needed for dry skin 385 g 3     baclofen (LIORESAL) 10 MG tablet TAKE 2 TABLETS BY MOUTH THREE TIMES DAILY 180 tablet 11     BASAGLAR 100 UNIT/ML injection Inject 26 Units Subcutaneous daily 30 mL 11     BD ROSE U/F 32G X 4 MM insulin pen needle USE ONCE DAILY AS DIRECTED 100 each 11     blood glucose monitoring (NO BRAND SPECIFIED) test strip Use to test blood sugars 1 time daily. ACCU-CHEK KARISHMA BRAND PLEASE. 100 strip 3     blood glucose monitoring (NO  "BRAND SPECIFIED) test strip Use to test blood sugars one time daily or as directed    Accucheck Richards plus test strips 1 Box 5     blood glucose monitoring (SOFTCLIX) lancets Use to test blood sugar 1 time daily or as directed. 100 each 3     Blood Glucose Monitoring Suppl (ACURA BLOOD GLUCOSE METER) W/DEVICE KIT 1 Dose 2 times daily 1 kit 1     hydrochlorothiazide (HYDRODIURIL) 25 MG tablet TAKE 1 TABLET (25 MG) BY MOUTH DAILY (Patient taking differently: AM) 90 tablet 3     mupirocin (BACTROBAN) 2 % ointment Apply to anterior nares BID X 2 month supply (Patient taking differently: Apply topically as needed Apply to anterior nares BID X 2 month supply) 2 g 3     omeprazole (PRILOSEC) 40 MG capsule Take 1 capsule (40 mg) by mouth daily Take 30-60 minutes before a meal. 90 capsule 3     ORDER FOR DME Equipment being ordered: BP cuff 1 Device 0     promethazine (PHENERGAN) 25 MG tablet TAKE 1 TABLET (25 MG) BY MOUTH EVERY 6 HOURS AS NEEDED FOR NAUSEA 40 tablet 11     propranolol (INDERAL) 40 MG tablet Take 1 tablet (40 mg) by mouth 2 times daily 180 tablet 1     propranolol HCl 60 MG TABS Take 1 tablet (60 mg) by mouth 2 times daily 180 tablet 3     sertraline (ZOLOFT) 100 MG tablet Take 2 tablets (200 mg) by mouth daily (Patient taking differently: Take 200 mg by mouth every morning ) 180 tablet 1     STATIN NOT PRESCRIBED, INTENTIONAL, Please choose reason not prescribed, below       Syringe, Disposable, 25 ML MISC Syringe to be used for nasal irrigation 1 each 3     order for DME Equipment being ordered: wound care supplies  CONVATEC DUODERM  Extra Thin Dressing - 1 1/4\" x 1 1/2\", Spots, oval  [id number]  Wound measurements  3 cm x 3 cm x 0.1 cm lesions with wound area of 1.5 x 1.5 that has slight drainage, full thickness located on the right sacral region (Patient not taking: Reported on 9/17/2018) 1 Box 3     order for DME   Location:sacral ulcer unknown etiology possible sheer    measurement: 2.5 cm x 2 cm x 0.1 " cm Unlikely to be a  pressure ulcer but if it is it's a stg 2,  partial thickness, low eexudate    Wound supplies :  5 each Comfeel  Plus Transparent 3530 2 x 2 3/4''  5x7 cm  [facility]     wound care orders: cleans wound, dry thoroughly  and replace Comfeel. Leave in place for 7 days for 5 weeks (Patient not taking: Reported on 9/17/2018) 5 each 3     order for DME Equipment being ordered: one donut for sitting (Patient not taking: Reported on 9/17/2018) 1 Device 0     oxyCODONE IR (ROXICODONE) 10 MG tablet Take 1 tablet (10 mg) by mouth every 6 hours as needed for moderate to severe pain (Patient not taking: Reported on 9/17/2018) 40 tablet 0     propranolol (INDERAL) 20 MG tablet TAKE 1 TABLET (20 MG) BY MOUTH 3 TIMES DAILY (Patient not taking: Reported on 9/17/2018) 90 tablet 0     Allergies   Allergen Reactions     Lisinopril Anaphylaxis     Swollen tongue; inability to swallow; drooling; hives; swollen face, neck, angioedema     Acetaminophen      Hx of cirrhosis      Amlodipine      Swelling, hives, possible angioedema       Morphine      b/p dropped and arms went numb  Hypotension     Quinolones Hives     Bactrim [Sulfamethoxazole W/Trimethoprim] Rash     Levaquin Swelling and Rash     Swelling in lip and tongue felt thick     Recent Labs   Lab Test  07/11/18   0707  07/02/18   1040  07/01/18   0541   06/18/18   0911   12/20/17   1049   05/15/17   0905   12/11/15   0854   10/05/15   0923   07/03/15   0910   04/26/13   1127   A1C   --    --    --    --   6.2*   --   6.4*   --   5.8   < >   --    --    --    < >   --    < >   --    LDL   --    --    --    --    --    --    --    --   173*   --   123*   --    --    --   166*   < >   --    HDL   --    --    --    --    --    --    --    --   43   --   42   --    --    --   33*   < >   --    TRIG   --    --    --    --    --    --    --    --   109   --   74   --    --    --   105   < >   --    ALT  19  21  20   < >  20   < >   --    < >   --    < >  28   < >   24   < >  27   < >   --    CR  0.65*  0.58*  0.59*   < >  0.65*   < >  0.68   < >   --    < >   --    < >  0.78   < >  0.67   < >   --    GFRESTIMATED  >90  >90  >90   < >  >90   < >  >90   < >   --    < >   --    < >  >90  Non  GFR Calc     < >  >90  Non  GFR Calc     < >   --    GFRESTBLACK  >90  >90  >90   < >  >90   < >  >90   < >   --    < >   --    < >  >90   GFR Calc     < >  >90   GFR Calc     < >   --    POTASSIUM  3.6  3.2*  3.5   < >  3.2*   < >  3.1*   < >   --    < >  3.9   < >  3.3*   < >  3.2*   < >   --    TSH   --    --    --    --    --    --    --    --    --    --    --    --   1.08   --    --    --   0.69    < > = values in this interval not displayed.        ROS:  Constitutional, HEENT, cardiovascular, pulmonary, GI, , musculoskeletal, neuro, skin, endocrine and psych systems are negative, except as otherwise noted.        OBJECTIVE:  /82  Pulse 50  Temp 97.7  F (36.5  C) (Oral)  Resp 16  Wt 224 lb 8 oz (101.8 kg)  SpO2 98%  BMI 31.31 kg/m2    EXAM:  GENERAL APPEARANCE: healthy, alert and no distress  Clear speech, smiling today, walks with cane.     ASSESSMENT AND PLAN  1. Hypertension goal BP (blood pressure) < 140/90  Increase to 60mg twice daily.  Follow up October as planned.   - propranolol HCl 60 MG TABS; Take 1 tablet (60 mg) by mouth 2 times daily  Dispense: 180 tablet; Refill: 3    2. Type 2 diabetes mellitus without complication, with long-term current use of insulin (H)  Controlled.  Will discuss statin with his gastroenterologist.     3. Alcoholic cirrhosis of liver without ascites (H)  Great job on recovery1!     4. Osteoarthritis of lumbar spine, unspecified spinal osteoarthritis complication status  Did handicap parking form today.     5. S/P insertion of spinal cord stimulator      6. Need for prophylactic vaccination and inoculation against influenza    - Vaccine Administration, Initial [31054]  -  "FLU VACCINE, (RIV4) RECOMBINANT HA  , IM (FluBlok, egg free) [86220]- >18 YRS (FM[facility] recommended  50-64 YRS)        MYCHART FOR ON-LINE CARE(VISITS), LABS, REFILLS, MESSAGING, ETC [email or web address] , [phone]    E-VISIT: click \"on-line care, then request e-visit\".  E-visits work well for following up on issues we have discussed in clinic previously which may need new prescriptions, new prescriptions or substantial discussion. These are always done by me (Dr. Wegener).     ONCARE VISIT:  [email or web address]  - we treat nearly 50 common conditions through on-care.  These are done in an hour by on-call staff.     RADIOLOGY:  [facility]:  [phone]   [facility]: [phone]    Mammogram and Colonoscopy [phone]    Smoking Cessation: [email or web address], 4-612-614-PLAN (9100)      CONSUMER PRICE LINE for estimates of test costs:  [phone]       Joel Wegener,MD      Injectable Influenza Immunization Documentation    1.  Is the person to be vaccinated sick today?   No    2. Does the person to be vaccinated have an allergy to a component   of the vaccine?   No  Egg Allergy Algorithm Link    3. Has the person to be vaccinated ever had a serious reaction   to influenza vaccine in the past?   No    4. Has the person to be vaccinated ever had Guillain-Barré syndrome?   No    Form completed by Debbi Dooley CMA           "

## 2018-09-17 NOTE — LETTER
My Depression Action Plan  Name: Erwin Aguilar   Date of Birth 1960  Date: 9/17/2018    My doctor: Wegener, Joel Daniel Irwin   My clinic: [address] [address] [phone]          GREEN    ZONE   Good Control    What it looks like:     Things are going generally well. You have normal up s and down s. You may even feel depressed from time to time, but bad moods usually last less than a day.   What you need to do:  1. Continue to care for yourself (see self care plan)  2. Check your depression survival kit and update it as needed  3. Follow your physician s recommendations including any medication.  4. Do not stop taking medication unless you consult with your physician first.           YELLOW         ZONE Getting Worse    What it looks like:     Depression is starting to interfere with your life.     It may be hard to get out of bed; you may be starting to isolate yourself from others.    Symptoms of depression are starting to last most all day and this has happened for several days.     You may have suicidal thoughts but they are not constant.   What you need to do:     1. Call your care team, your response to treatment will improve if you keep your care team informed of your progress. Yellow periods are signs an adjustment may need to be made.     2. Continue your self-care, even if you have to fake it!    3. Talk to someone in your support network    4. Open up your depression survival kit           RED    ZONE Medical Alert - Get Help    What it looks like:     Depression is seriously interfering with your life.     You may experience these or other symptoms: You can t get out of bed most days, can t work or engage in other necessary activities, you have trouble taking care of basic hygiene, or basic responsibilities, thoughts of suicide or death that will not go away, self-injurious behavior.     What you need to do:  1. Call your  care team and request a same-day appointment. If they are not available (weekends or after hours) call your local crisis line, emergency room or 911.            Depression Self Care Plan / Survival Kit    Self-Care for Depression  Here s the deal. Your body and mind are really not as separate as most people think.  What you do and think affects how you feel and how you feel influences what you do and think. This means if you do things that people who feel good do, it will help you feel better.  Sometimes this is all it takes.  There is also a place for medication and therapy depending on how severe your depression is, so be sure to consult with your medical provider and/ or Behavioral Health Consultant if your symptoms are worsening or not improving.     In order to better manage my stress, I will:    Exercise  Get some form of exercise, every day. This will help reduce pain and release endorphins, the  feel good  chemicals in your brain. This is almost as good as taking antidepressants!  This is not the same as joining a gym and then never going! (they count on that by the way ) It can be as simple as just going for a walk or doing some gardening, anything that will get you moving.      Hygiene   Maintain good hygiene (Get out of bed in the morning, Make your bed, Brush your teeth, Take a shower, and Get dressed like you were going to work, even if you are unemployed).  If your clothes don't fit try to get ones that do.    Diet  I will strive to eat foods that are good for me, drink plenty of water, and avoid excessive sugar, caffeine, alcohol, and other mood-altering substances.  Some foods that are helpful in depression are: complex carbohydrates, B vitamins, flaxseed, fish or fish oil, fresh fruits and vegetables.    Psychotherapy  I agree to participate in Individual Therapy (if recommended).    Medication  If prescribed medications, I agree to take them.  Missing doses can result in serious side effects.  I  understand that drinking alcohol, or other illicit drug use, may cause potential side effects.  I will not stop my medication abruptly without first discussing it with my provider.    Staying Connected With Others  I will stay in touch with my friends, family members, and my primary care provider/team.    Use your imagination  Be creative.  We all have a creative side; it doesn t matter if it s oil painting, sand castles, or mud pies! This will also kick up the endorphins.    Witness Beauty  (AKA stop and smell the roses) Take a look outside, even in mid-winter. Notice colors, textures. Watch the squirrels and birds.     Service to others  Be of service to others.  There is always someone else in need.  By helping others we can  get out of ourselves  and remember the really important things.  This also provides opportunities for practicing all the other parts of the program.    Humor  Laugh and be silly!  Adjust your TV habits for less news and crime-drama and more comedy.    Control your stress  Try breathing deep, massage therapy, biofeedback, and meditation. Find time to relax each day.     My support system    Clinic Contact:  Phone number:    Contact 1:  Phone number:    Contact 2:  Phone number:    Mandaeism/:  Phone number:    Therapist:  Phone number:    Local crisis center:    Phone number:    Other community support:  Phone number:

## 2018-09-17 NOTE — MR AVS SNAPSHOT
After Visit Summary   9/17/2018    Erwin Aguilar    MRN: [id number]           Patient Information     Date Of Birth          1960        Visit Information        Provider Department      9/17/2018 8:00 AM Wegener, Joel Daniel Irwin, MD [facility]        Today's Diagnoses     Need for prophylactic vaccination and inoculation against influenza    -  1    Hypertension goal BP (blood pressure) < 140/90        Type 2 diabetes mellitus without complication, with long-term current use of insulin (H)        Alcoholic cirrhosis of liver without ascites (H)        Osteoarthritis of lumbar spine, unspecified spinal osteoarthritis complication status        S/P insertion of spinal cord stimulator           Follow-ups after your visit        Your next 10 appointments already scheduled     Oct 09, 2018  8:40 AM CDT   MyChart Short with Joel Daniel Irwin Wegener, MD   [facility] ([facility])    [address] [place] [id number] [phone]            Jan 14, 2019  8:30 AM CST   (Arrive by 8:15 AM)   Return General Liver with Kimberly Ramirez MD   [facility] ([facility])    [address] [place] [id number] [phone]              Who to contact     If you have questions or need follow up information about today's clinic visit or your schedule please contact [facility] directly at [phone].  Normal or non-critical lab and imaging results will be communicated to you by MyChart, letter or phone within 4 business days after the clinic has received the results. If you do not hear from us within 7 days, please contact the clinic through [employer] or phone. If you have a critical or abnormal lab result, we will notify you by phone as soon as possible.  Submit refill requests through [employer] or call your pharmacy and they will forward the refill request to us.  Please allow 3 business days for your refill to be completed.          Additional Information About Your Visit        [employer] Information     [employer] gives you secure access to your electronic health record. If you see a primary care provider, you can also send messages to your care team and make appointments. If you have questions, please call your primary care clinic.  If you do not have a primary care provider, please call [phone] and they will assist you.        Care EveryWhere ID     This is your Care EveryWhere ID. This could be used by other organizations to access your [facility] medical records  [id number]        Your Vitals Were     Pulse Temperature Respirations Pulse Oximetry BMI (Body Mass Index)       50 97.7  F (36.5  C) (Oral) 16 98% 31.31 kg/m2        Blood Pressure from Last 3 Encounters:   09/17/18 140/76   08/02/18 133/73   07/27/18 153/78    Weight from Last 3 Encounters:   09/17/18 224 lb 8 oz (101.8 kg)   08/02/18 220 lb (99.8 kg)   07/27/18 211 lb (95.7 kg)              We Performed the Following     Asthma Action Plan (AAP)     DEPRESSION ACTION PLAN (DAP)     FLU VACCINE, (RIV4) RECOMBINANT HA  , IM (FluBlok, egg free) [45513]- >18 YRS (FMG recommended  50-64 YRS)     Vaccine Administration, Initial [71491]          Today's Medication Changes          These changes are accurate as of 9/17/18  8:39 AM.  If you have any questions, ask your nurse or doctor.               These medicines have changed or have updated prescriptions.        Dose/Directions    hydrochlorothiazide 25 MG tablet   Commonly known as:  HYDRODIURIL   This may have changed:  See the new instructions.   Used for:  Hypertension goal BP (blood pressure) < 140/90        TAKE 1 TABLET (25 MG) BY MOUTH DAILY   Quantity:  90 tablet   Refills:  3       mupirocin 2 % ointment   Commonly known as:  BACTROBAN   This may have changed:    - how to take this  - when to take this  - reasons to take this  - additional instructions    Used for:  Nasal lesion, Nasal vestibulitis        Apply to anterior nares BID X 2 month supply   Quantity:  2 g   Refills:  3       * propranolol 20 MG tablet   Commonly known as:  INDERAL   This may have changed:  Another medication with the same name was added. Make sure you understand how and when to take each.   Used for:  Esophageal varices in cirrhosis (H)   Changed by:  Wegener, Joel Daniel Irwin, MD        TAKE 1 TABLET (20 MG) BY MOUTH 3 TIMES DAILY   Quantity:  90 tablet   Refills:  0       * propranolol 40 MG tablet   Commonly known as:  INDERAL   This may have changed:  Another medication with the same name was added. Make sure you understand how and when to take each.   Used for:  Hypertension goal BP (blood pressure) < 140/90   Changed by:  Wegener, Joel Daniel Irwin, MD        Dose:  40 mg   Take 1 tablet (40 mg) by mouth 2 times daily   Quantity:  180 tablet   Refills:  1       * propranolol HCl 60 MG Tabs   This may have changed:  You were already taking a medication with the same name, and this prescription was added. Make sure you understand how and when to take each.   Used for:  Hypertension goal BP (blood pressure) < 140/90   Changed by:  Wegener, Joel Daniel Irwin, MD        Dose:  60 mg   Take 1 tablet (60 mg) by mouth 2 times daily   Quantity:  180 tablet   Refills:  3       sertraline 100 MG tablet   Commonly known as:  ZOLOFT   This may have changed:  when to take this   Used for:  Generalized anxiety disorder        Dose:  200 mg   Take 2 tablets (200 mg) by mouth daily   Quantity:  180 tablet   Refills:  1       * Notice:  This list has 3 medication(s) that are the same as other medications prescribed for you. Read the directions carefully, and ask your doctor or other care provider to review them with you.         Where to get your medicines      These medications were sent to [facility]/pharmacy #[id number] - [place], [place] - [address]  [address], [place] [address]     Phone:   [phone]     propranolol HCl 60 MG Tabs                Primary Care Provider Office Phone # Fax #    Demario Daniel Irwin Wegener, [phone] [address] [place] [address]        Equal Access to Services     DEANGELORENATO BECERRILMICK : Hadii aad ku hadasho Soomaali, waaxda luqadaha, qaybta kaalmada adeegyada, waxay idiin hayaan adeeg kholayinka laheenan ah. So [facility] [phone].    ATENCIÓN: Si habla español, tiene a camp disposición servicios gratuitos de asistencia lingüística. Llame al [phone].    We comply with applicable federal civil rights laws and [place] laws. We do not discriminate on the basis of race, color, national origin, age, disability, sex, sexual orientation, or gender identity.            Thank you!     Thank you for choosing [facility]  for your care. Our goal is always to provide you with excellent care. Hearing back from our patients is one way we can continue to improve our services. Please take a few minutes to complete the written survey that you may receive in the mail after your visit with us. Thank you!             Your Updated Medication List - Protect others around you: Learn how to safely use, store and throw away your medicines at www.disposemymeds.org.          This list is accurate as of 9/17/18  8:39 AM.  Always use your most recent med list.                   Brand Name Dispense Instructions for use Diagnosis    * ACE/ARB/ARNI NOT PRESCRIBED (INTENTIONAL)      ACE & ARB not prescribed due to Allergy        ACURA BLOOD GLUCOSE METER w/Device Kit     1 kit    1 Dose 2 times daily    Type 2 diabetes, HbA1c goal < 7% (H)       albuterol 108 (90 Base) MCG/ACT inhaler    PROAIR HFA/PROVENTIL HFA/VENTOLIN HFA    3 Inhaler    Inhale 2 puffs into the lungs every 6 hours as needed for shortness of breath / dyspnea or wheezing Ventolin or other covered brand    Moderate persistent asthma without complication       Alcohol Pads 70 % Pads     100 each    1 Box  4 times daily    Type 2 diabetes, HbA1c goal < 7% (H)       ammonium lactate 12 % cream    LAC-HYDRIN    385 g    Apply topically 2 times daily as needed for dry skin    Xerosis cutis       baclofen 10 MG tablet    LIORESAL    180 tablet    TAKE 2 TABLETS BY MOUTH THREE TIMES DAILY    Muscle spasms of both lower extremities, Back muscle spasm       BASAGLAR 100 UNIT/ML injection     30 mL    Inject 26 Units Subcutaneous daily    Type 2 diabetes mellitus without complication, with long-term current use of insulin (H)       BD ROSE U/F 32G X 4 MM   Generic drug:  insulin pen needle     100 each    USE ONCE DAILY AS DIRECTED    Type 2 diabetes mellitus without complication, with long-term current use of insulin (H)       blood glucose monitoring lancets     100 each    Use to test blood sugar 1 time daily or as directed.    Type 2 diabetes, HbA1c goal < 7% (H)       * blood glucose monitoring test strip    no brand specified    1 Box    Use to test blood sugars one time daily or as directed  Accucheck Richards plus test strips    Type 2 diabetes, HbA1c goal < 7% (H)       * blood glucose monitoring test strip    no brand specified    100 strip    Use to test blood sugars 1 time daily. ACCU-CHEK KARISHMA BRAND PLEASE.    Type 2 diabetes mellitus with diabetic polyneuropathy, with long-term current use of insulin (H)       hydrochlorothiazide 25 MG tablet    HYDRODIURIL    90 tablet    TAKE 1 TABLET (25 MG) BY MOUTH DAILY    Hypertension goal BP (blood pressure) < 140/90       mupirocin 2 % ointment    BACTROBAN    2 g    Apply to anterior nares BID X 2 month supply    Nasal lesion, Nasal vestibulitis       omeprazole 40 MG capsule    priLOSEC    90 capsule    Take 1 capsule (40 mg) by mouth daily Take 30-60 minutes before a meal.    Gastroesophageal reflux disease without esophagitis       * order for DME     1 Device    Equipment being ordered: BP cuff    Hypertension goal BP (blood pressure) < 140/90       * order for DME  "    1 Device    Equipment being ordered: one donut for sitting    Pressure ulcer, stage II       * order for DME     5 each    ?Location:sacral ulcer unknown etiology possible sheer ?measurement: 2.5 cm x 2 cm x 0.1 cm Unlikely to be a  pressure ulcer but if it is it's a stg 2,  partial thickness, low eexudate  Wound supplies : 5 each Comfeel? Plus [id number] x 2 3/4''  5x7 cm  [facility]   wound care orders: cleans wound, dry thoroughly  and replace Comfeel. Leave in place for 7 days for 5 weeks    Wound, open, buttock, right, initial encounter       * order for DME     1 Box    Equipment being ordered: wound care supplies CONVATEC DUODERM  Extra Thin Dressing - 1 1/4\" x 1 1/2\", Spots, oval [id number] Wound measurements 3 cm x 3 cm x 0.1 cm lesions with wound area of 1.5 x 1.5 that has slight drainage, full thickness located on the right sacral region    Benign neoplasm of skin of trunk, except scrotum       oxyCODONE IR 10 MG tablet    ROXICODONE    40 tablet    Take 1 tablet (10 mg) by mouth every 6 hours as needed for moderate to severe pain    Post laminectomy syndrome       promethazine 25 MG tablet    PHENERGAN    40 tablet    TAKE 1 TABLET (25 MG) BY MOUTH EVERY 6 HOURS AS NEEDED FOR NAUSEA    Nausea without vomiting       * propranolol 20 MG tablet    INDERAL    90 tablet    TAKE 1 TABLET (20 MG) BY MOUTH 3 TIMES DAILY    Esophageal varices in cirrhosis (H)       * propranolol 40 MG tablet    INDERAL    180 tablet    Take 1 tablet (40 mg) by mouth 2 times daily    Hypertension goal BP (blood pressure) < 140/90       * propranolol HCl 60 MG Tabs     180 tablet    Take 1 tablet (60 mg) by mouth 2 times daily    Hypertension goal BP (blood pressure) < 140/90       sertraline 100 MG tablet    ZOLOFT    180 tablet    Take 2 tablets (200 mg) by mouth daily    Generalized anxiety disorder       Syringe (Disposable) 25 ML Misc     1 each    Syringe to be used for nasal irrigation    Chronic maxillary sinusitis "       * Notice:  This list has 10 medication(s) that are the same as other medications prescribed for you. Read the directions carefully, and ask your doctor or other care provider to review them with you.

## 2018-09-18 ASSESSMENT — ANXIETY QUESTIONNAIRES: GAD7 TOTAL SCORE: 0

## 2018-09-18 ASSESSMENT — PATIENT HEALTH QUESTIONNAIRE - PHQ9: SUM OF ALL RESPONSES TO PHQ QUESTIONS 1-9: 0

## 2018-10-09 ENCOUNTER — OFFICE VISIT (OUTPATIENT)
Dept: FAMILY MEDICINE | Facility: CLINIC | Age: 58
End: 2018-10-09
Payer: MEDICARE

## 2018-10-09 VITALS
HEIGHT: 71 IN | SYSTOLIC BLOOD PRESSURE: 140 MMHG | BODY MASS INDEX: 31.64 KG/M2 | HEART RATE: 53 BPM | TEMPERATURE: 97.5 F | WEIGHT: 226 LBS | RESPIRATION RATE: 20 BRPM | DIASTOLIC BLOOD PRESSURE: 90 MMHG | OXYGEN SATURATION: 99 %

## 2018-10-09 DIAGNOSIS — I10 HYPERTENSION GOAL BP (BLOOD PRESSURE) < 140/90: Primary | ICD-10-CM

## 2018-10-09 DIAGNOSIS — E78.5 HYPERLIPIDEMIA LDL GOAL <100: ICD-10-CM

## 2018-10-09 DIAGNOSIS — Z79.4 TYPE 2 DIABETES MELLITUS WITHOUT COMPLICATION, WITH LONG-TERM CURRENT USE OF INSULIN (H): ICD-10-CM

## 2018-10-09 DIAGNOSIS — E11.9 TYPE 2 DIABETES MELLITUS WITHOUT COMPLICATION, WITH LONG-TERM CURRENT USE OF INSULIN (H): ICD-10-CM

## 2018-10-09 LAB
CHOLEST SERPL-MCNC: 258 MG/DL
HBA1C MFR BLD: 6.1 % (ref 0–5.6)
HDLC SERPL-MCNC: 44 MG/DL
LDLC SERPL CALC-MCNC: 170 MG/DL
NONHDLC SERPL-MCNC: 214 MG/DL
TRIGL SERPL-MCNC: 221 MG/DL
TSH SERPL DL<=0.005 MIU/L-ACNC: 1.36 MU/L (ref 0.4–4)

## 2018-10-09 PROCEDURE — 84443 ASSAY THYROID STIM HORMONE: CPT | Performed by: FAMILY MEDICINE

## 2018-10-09 PROCEDURE — 99214 OFFICE O/P EST MOD 30 MIN: CPT | Performed by: FAMILY MEDICINE

## 2018-10-09 PROCEDURE — 36415 COLL VENOUS BLD VENIPUNCTURE: CPT | Performed by: FAMILY MEDICINE

## 2018-10-09 PROCEDURE — 80061 LIPID PANEL: CPT | Performed by: FAMILY MEDICINE

## 2018-10-09 PROCEDURE — 83036 HEMOGLOBIN GLYCOSYLATED A1C: CPT | Performed by: FAMILY MEDICINE

## 2018-10-09 RX ORDER — PROPRANOLOL HYDROCHLORIDE 60 MG/1
60 TABLET ORAL 2 TIMES DAILY
Qty: 180 TABLET | Refills: 3 | Status: SHIPPED | OUTPATIENT
Start: 2018-10-09 | End: 2019-01-16 | Stop reason: DRUGHIGH

## 2018-10-09 NOTE — PATIENT INSTRUCTIONS
"ASSESSMENT AND PLAN  1. Hypertension goal BP (blood pressure) < 140/90  Increase to 60mg twice daily.     Follow up one month nurse blood pressue check.     Diabetes labs today, follow up depending on results.     Had flu shot already.       - propranolol HCl 60 MG TABS; Take 1 tablet (60 mg) by mouth 2 times daily  Dispense: 180 tablet; Refill: 3        MYCHART FOR ON-LINE CARE(VISITS), LABS, REFILLS, MESSAGING, ETC [email or web address] [phone]    E-VISIT: click \"on-line care, then request e-visit\".  E-visits work well for following up on issues we have discussed in clinic previously which may need new prescriptions, new prescriptions or substantial discussion. These are always done by me (Dr. Wegener).     ONCARE VISIT:  [email or web address]  - we treat nearly 50 common conditions through on-care.  These are done in an hour by on-call staff.     RADIOLOGY:  [facility]:  [phone]   [facility]: [phone]    Mammogram and Colonoscopy [phone]    Smoking Cessation: [email or web address], 6-605-925-PLAN (9090)      CONSUMER PRICE LINE for estimates of test costs:  [phone]       "

## 2018-10-09 NOTE — MR AVS SNAPSHOT
"              After Visit Summary   10/9/2018    Erwin Aguilar    MRN: [id number]           Patient Information     Date Of Birth          1960        Visit Information        Provider Department      10/9/2018 8:40 AM Wegener, Joel Daniel Irwin, MD [facility]        Today's Diagnoses     Hypertension goal BP (blood pressure) < 140/90    -  1      Care Instructions    ASSESSMENT AND PLAN  1. Hypertension goal BP (blood pressure) < 140/90  Increase to 60mg twice daily.     Follow up one month nurse blood pressue check.     Diabetes labs today, follow up depending on results.     Had flu shot already.       - propranolol HCl 60 MG TABS; Take 1 tablet (60 mg) by mouth 2 times daily  Dispense: 180 tablet; Refill: 3        MYCHART FOR ON-LINE CARE(VISITS), LABS, REFILLS, MESSAGING, ETC [email or web address] [phone]    E-VISIT: click \"on-line care, then request e-visit\".  E-visits work well for following up on issues we have discussed in clinic previously which may need new prescriptions, new prescriptions or substantial discussion. These are always done by me (Dr. Wegener).     ONCARE VISIT:  [email or web address]  - we treat nearly 50 common conditions through on-care.  These are done in an hour by on-call staff.     RADIOLOGY:  [facility]:  [phone]   [facility]: [phone]    Mammogram and Colonoscopy [phone]    Smoking Cessation: [email or web address], 3-741-528-PLAN ([phone])      CONSUMER PRICE LINE for estimates of test costs:  [phone]               Follow-ups after your visit        Your next 10 appointments already scheduled     2019  8:30 AM CST   (Arrive by 8:15 AM)   Return General Liver with Kimberly Ramirez MD   [facility] ([facility])    [address] [place] [address]  [address] [place] [id number] [phone]              Who to contact     If you have questions or need follow up " "information about today's clinic visit or your schedule please contact [facility] directly at [phone].  Normal or non-critical lab and imaging results will be communicated to you by [employer], letter or phone within 4 business days after the clinic has received the results. If you do not hear from us within 7 days, please contact the clinic through FlowPlayt or phone. If you have a critical or abnormal lab result, we will notify you by phone as soon as possible.  Submit refill requests through [employer] or call your pharmacy and they will forward the refill request to us. Please allow 3 business days for your refill to be completed.          Additional Information About Your Visit        [employer] Information     [employer] gives you secure access to your electronic health record. If you see a primary care provider, you can also send messages to your care team and make appointments. If you have questions, please call your primary care clinic.  If you do not have a primary care provider, please call [phone] and they will assist you.        Care EveryWhere ID     This is your Care EveryWhere ID. This could be used by other organizations to access your [facility] medical records  [id number]        Your Vitals Were     Pulse Temperature Respirations Height Pulse Oximetry BMI (Body Mass Index)    53 97.5  F (36.4  C) (Oral) 20 5' 11\" (1.803 m) 99% 31.52 kg/m2       Blood Pressure from Last 3 Encounters:   10/09/18 140/90   09/17/18 135/82   08/02/18 133/73    Weight from Last 3 Encounters:   10/09/18 226 lb (102.5 kg)   09/17/18 224 lb 8 oz (101.8 kg)   08/02/18 220 lb (99.8 kg)              Today, you had the following     No orders found for display         Today's Medication Changes          These changes are accurate as of 10/9/18  9:19 AM.  If you have any questions, ask your nurse or doctor.               These medicines have changed or have updated prescriptions.        Dose/Directions    " hydrochlorothiazide 25 MG tablet   Commonly known as:  HYDRODIURIL   This may have changed:  See the new instructions.   Used for:  Hypertension goal BP (blood pressure) < 140/90        TAKE 1 TABLET (25 MG) BY MOUTH DAILY   Quantity:  90 tablet   Refills:  3       mupirocin 2 % ointment   Commonly known as:  BACTROBAN   This may have changed:    - how to take this  - when to take this  - reasons to take this  - additional instructions   Used for:  Nasal lesion, Nasal vestibulitis        Apply to anterior nares BID X 2 month supply   Quantity:  2 g   Refills:  3       propranolol HCl 60 MG Tabs   This may have changed:  Another medication with the same name was removed. Continue taking this medication, and follow the directions you see here.   Used for:  Hypertension goal BP (blood pressure) < 140/90   Changed by:  Wegener, Joel Daniel Irwin, MD        Dose:  60 mg   Take 1 tablet (60 mg) by mouth 2 times daily   Quantity:  180 tablet   Refills:  3       sertraline 100 MG tablet   Commonly known as:  ZOLOFT   This may have changed:  when to take this   Used for:  Generalized anxiety disorder        Dose:  200 mg   Take 2 tablets (200 mg) by mouth daily   Quantity:  180 tablet   Refills:  1            Where to get your medicines      These medications were sent to [facility]/pharmacy #[id number] - [place], [place] - [address]  [address], [place] [address]     Phone:  [phone]     propranolol HCl 60 MG Tabs                Primary Care Provider Office Phone # Fax #    Joel Daniel Irwin Wegener, [phone] [address] [place] [address]        Equal Access to Services     [facility]: Hadii mary garcia hadasho Soomaali, waaxda luqadaha, qaybta kaalmada adeegyada, donis fallon. So [facility] [phone].    ATENCIÓN: Si habla español, tiene a camp disposición servicios gratuitos de asistencia lingüística. Llame al [phone].    We comply with applicable federal  civil rights laws and [place] laws. We do not discriminate on the basis of race, color, national origin, age, disability, sex, sexual orientation, or gender identity.            Thank you!     Thank you for choosing [facility]  for your care. Our goal is always to provide you with excellent care. Hearing back from our patients is one way we can continue to improve our services. Please take a few minutes to complete the written survey that you may receive in the mail after your visit with us. Thank you!             Your Updated Medication List - Protect others around you: Learn how to safely use, store and throw away your medicines at www.disposemymeds.org.          This list is accurate as of 10/9/18  9:19 AM.  Always use your most recent med list.                   Brand Name Dispense Instructions for use Diagnosis    * ACE/ARB/ARNI NOT PRESCRIBED (INTENTIONAL)      ACE & ARB not prescribed due to Allergy        ACURA BLOOD GLUCOSE METER w/Device Kit     1 kit    1 Dose 2 times daily    Type 2 diabetes, HbA1c goal < 7% (H)       albuterol 108 (90 Base) MCG/ACT inhaler    PROAIR HFA/PROVENTIL HFA/VENTOLIN HFA    3 Inhaler    Inhale 2 puffs into the lungs every 6 hours as needed for shortness of breath / dyspnea or wheezing Ventolin or other covered brand    Moderate persistent asthma without complication       Alcohol Pads 70 % Pads     100 each    1 Box 4 times daily    Type 2 diabetes, HbA1c goal < 7% (H)       ammonium lactate 12 % cream    LAC-HYDRIN    385 g    Apply topically 2 times daily as needed for dry skin    Xerosis cutis       baclofen 10 MG tablet    LIORESAL    180 tablet    TAKE 2 TABLETS BY MOUTH THREE TIMES DAILY    Muscle spasms of both lower extremities, Back muscle spasm       BASAGLAR 100 UNIT/ML injection     30 mL    Inject 26 Units Subcutaneous daily    Type 2 diabetes mellitus without complication, with long-term current use of insulin (H)       BD ROSE U/F 32G X 4 MM    Generic drug:  insulin pen needle     100 each    USE ONCE DAILY AS DIRECTED    Type 2 diabetes mellitus without complication, with long-term current use of insulin (H)       blood glucose monitoring lancets     100 each    Use to test blood sugar 1 time daily or as directed.    Type 2 diabetes, HbA1c goal < 7% (H)       * blood glucose monitoring test strip    no brand specified    1 Box    Use to test blood sugars one time daily or as directed  Accucheck Richards plus test strips    Type 2 diabetes, HbA1c goal < 7% (H)       * blood glucose monitoring test strip    no brand specified    100 strip    Use to test blood sugars 1 time daily. ACCU-CHEK KARISHMA BRAND PLEASE.    Type 2 diabetes mellitus with diabetic polyneuropathy, with long-term current use of insulin (H)       hydrochlorothiazide 25 MG tablet    HYDRODIURIL    90 tablet    TAKE 1 TABLET (25 MG) BY MOUTH DAILY    Hypertension goal BP (blood pressure) < 140/90       mupirocin 2 % ointment    BACTROBAN    2 g    Apply to anterior nares BID X 2 month supply    Nasal lesion, Nasal vestibulitis       omeprazole 40 MG capsule    priLOSEC    90 capsule    Take 1 capsule (40 mg) by mouth daily Take 30-60 minutes before a meal.    Gastroesophageal reflux disease without esophagitis       * order for DME     1 Device    Equipment being ordered: BP cuff    Hypertension goal BP (blood pressure) < 140/90       * order for DME     1 Device    Equipment being ordered: one donut for sitting    Pressure ulcer, stage II       * order for DME     5 each    ?Location:sacral ulcer unknown etiology possible sheer ?measurement: 2.5 cm x 2 cm x 0.1 cm Unlikely to be a  pressure ulcer but if it is it's a stg 2,  partial thickness, low eexudate  Wound supplies : 5 each Comfeel? Plus [id number] x 2 3/4''  5x7 cm  [facility]   wound care orders: cleans wound, dry thoroughly  and replace Comfeel. Leave in place for 7 days for 5 weeks    Wound, open, buttock, right, initial  "encounter       * order for DME     1 Box    Equipment being ordered: wound care supplies CONVATEC DUODERM  Extra Thin Dressing - 1 1/4\" x 1 1/2\", Spots, oval [id number] Wound measurements 3 cm x 3 cm x 0.1 cm lesions with wound area of 1.5 x 1.5 that has slight drainage, full thickness located on the right sacral region    Benign neoplasm of skin of trunk, except scrotum       oxyCODONE IR 10 MG tablet    ROXICODONE    40 tablet    Take 1 tablet (10 mg) by mouth every 6 hours as needed for moderate to severe pain    Post laminectomy syndrome       promethazine 25 MG tablet    PHENERGAN    40 tablet    TAKE 1 TABLET (25 MG) BY MOUTH EVERY 6 HOURS AS NEEDED FOR NAUSEA    Nausea without vomiting       propranolol HCl 60 MG Tabs     180 tablet    Take 1 tablet (60 mg) by mouth 2 times daily    Hypertension goal BP (blood pressure) < 140/90       sertraline 100 MG tablet    ZOLOFT    180 tablet    Take 2 tablets (200 mg) by mouth daily    Generalized anxiety disorder       STATIN NOT PRESCRIBED (INTENTIONAL)      Please choose reason not prescribed, below    Type 2 diabetes mellitus without complication, with long-term current use of insulin (H), Alcoholic cirrhosis of liver without ascites (H)       Syringe (Disposable) 25 ML Misc     1 each    Syringe to be used for nasal irrigation    Chronic maxillary sinusitis       * Notice:  This list has 7 medication(s) that are the same as other medications prescribed for you. Read the directions carefully, and ask your doctor or other care provider to review them with you.      "

## 2018-10-09 NOTE — PROGRESS NOTES
SUBJECTIVE:   Erwin Aguilar is a 57 year old male who presents to clinic today for the following health issues:      Hyperlipidemia Follow-Up      Rate your low fat/cholesterol diet?: poor    Taking statin?  No     Other lipid medications/supplements?:  None        Hypertension Follow-up      Outpatient blood pressures sometimes     Low Salt Diet: low salt    BP Readings from Last 2 Encounters:   09/17/18 135/82   08/02/18 133/73     Hemoglobin A1C (%)   Date Value   06/18/2018 6.2 (H)   12/20/2017 6.4 (H)     LDL Cholesterol Calculated (mg/dL)   Date Value   05/15/2017 173 (H)   12/11/2015 123 (H)       Amount of exercise or physical activity: 6-7 days/week for an average of less than 15 minutes    Problems taking medications regularly: No    Medication side effects: none    Diet: regular (no restrictions)    Reports that he continues to experience remarkable improvement in his chronic pain syndrome. Still does not require any opioids for pain relief.     Since last clinic visit underwent repeat upper GI 8/2 as follow up for history of alcoholic cirrhosis and to rule out variceal surveillance.     Concerns about eating after getting new dentures he received in August 2018. Happy about cosmetic results, but unable to chew solid foods well now. Not eating meat or much protein. Mostly bananas and yogurt. Apparently will cost $4,000 to have dentures anchored in which he cannot afford.        Hypertension goal BP (blood pressure) < 140/90  Type 2 diabetes mellitus without complication, with long-term current use of insulin (H)  Hyperlipidemia LDL goal <100 : repeat lipid panel today.         Problem list, Medication list, Allergies, and Medical/Social/Surgical histories reviewed in [facility] and updated as appropriate.  Labs reviewed in [facility]  BP Readings from Last 3 Encounters:   10/09/18 140/90   09/17/18 135/82   08/02/18 133/73    Wt Readings from Last 3 Encounters:   10/09/18 226 lb (102.5 kg)   09/17/18 224 lb 8 oz  (101.8 kg)   08/02/18 220 lb (99.8 kg)                  Patient Active Problem List   Diagnosis     Generalized anxiety disorder     Type 2 diabetes, HbA1c goal < 7% (H)     Hyperlipidemia LDL goal <100     Hypertension goal BP (blood pressure) < 140/90     Major depressive disorder, recurrent episode, mild (H)     Chronic pain syndrome     GERD (gastroesophageal reflux disease)     Abnormal liver function tests     Health Care Home     Abnormal EMG     Hernia     Prolonged QT interval     Diastasis recti     Hypokalemia     Cauda equina syndrome with neurogenic bladder (H)     Wound infection     Cellulitis     Nausea without vomiting     Atopic dermatitis     Muscle spasms of Upper extremity     Edema of left lower extremity     Esophageal varices in cirrhosis (H)     Cirrhosis of liver (H)     Anemia due to blood loss, acute     Skin infection     Superficial thrombophlebitis of arm     Cellulitis of hand     Ganglion cyst     Dry skin dermatitis     Moderate persistent asthma     Open wound of right heel     Fall     Closed fracture of right patella     Right knee pain     Alcoholic cirrhosis of liver without ascites (H)     Lumbosacral radiculopathy     Calculus of gallbladder without cholecystitis without obstruction     Type 2 diabetes mellitus without complication, with long-term current use of insulin (H)     Wound, open, buttock, right     Type 2 diabetes mellitus with diabetic polyneuropathy, with long-term current use of insulin (H)     Benign neoplasm of skin of trunk, except scrotum     Hematemesis with nausea     Osteoarthritis of lumbar spine, unspecified spinal osteoarthritis complication status     S/P insertion of spinal cord stimulator     Past Surgical History:   Procedure Laterality Date     BACK SURGERY  August 2009     C APPENDECTOMY  1974     COLONOSCOPY N/A 5/12/2016    Procedure: COMBINED COLONOSCOPY, SINGLE OR MULTIPLE BIOPSY/POLYPECTOMY BY BIOPSY;  Surgeon: Ana Paula Urbina,  MD;  Location:  GI     ENDOSCOPY UPPER, COLONOSCOPY, COMBINED  10/19/2011    Procedure:COMBINED ENDOSCOPY UPPER, COLONOSCOPY; Upper Endoscopy, Colonoscopy with Polypectomy x4; Surgeon:AMBAR RODRÍGUEZ; Location: OR     ENT SURGERY  1-2016    Ongoing since then     ESOPHAGOSCOPY, GASTROSCOPY, DUODENOSCOPY (EGD), COMBINED  3/28/2014    Procedure: COMBINED ESOPHAGOSCOPY, GASTROSCOPY, DUODENOSCOPY (EGD);  EGD, Gastric Biopsies, Esophageal Banding;  Surgeon: Reyna Tovar MD;  [facility]     ESOPHAGOSCOPY, GASTROSCOPY, DUODENOSCOPY (EGD), COMBINED  6/9/2014    Procedure: COMBINED ESOPHAGOSCOPY, GASTROSCOPY, DUODENOSCOPY (EGD);  Surgeon: Curtis Mendez MD;  Location:  GI     ESOPHAGOSCOPY, GASTROSCOPY, DUODENOSCOPY (EGD), COMBINED  7/24/2014    Procedure: COMBINED ESOPHAGOSCOPY, GASTROSCOPY, DUODENOSCOPY (EGD);  Surgeon: Gerard Samaniego MD;  Location:  OR     ESOPHAGOSCOPY, GASTROSCOPY, DUODENOSCOPY (EGD), COMBINED N/A 10/31/2014    Procedure: COMBINED ESOPHAGOSCOPY, GASTROSCOPY, DUODENOSCOPY (EGD);  Surgeon: Gerard Samaniego MD;  Location:  OR     ESOPHAGOSCOPY, GASTROSCOPY, DUODENOSCOPY (EGD), COMBINED N/A 5/12/2016    Procedure: COMBINED ESOPHAGOSCOPY, GASTROSCOPY, DUODENOSCOPY (EGD);  Surgeon: Ana Paula Urbina MD;  Location:  GI     ESOPHAGOSCOPY, GASTROSCOPY, DUODENOSCOPY (EGD), COMBINED N/A 8/2/2018    Procedure: COMBINED ESOPHAGOSCOPY, GASTROSCOPY, DUODENOSCOPY (EGD);  EGD;  Surgeon: Yu Wagner MD;  Location:  GI     HCL SQUAMOUS CELL CARCINOMA AG  10/07    left forearm     HERNIORRHAPHY UMBILICAL  11/8/2012    Procedure: HERNIORRHAPHY UMBILICAL;  Open Umbilical Hernia Repair With Mesh ;  Surgeon: Chase Nicholson MD;  Location: [facility] OR     INSERT STIMULATOR DORSAL COLUMN N/A 6/28/2018    Procedure: INSERT STIMULATOR DORSAL COLUMN;;  Surgeon: Elvis Sauceda MD;  Location: [facility] OR     neuro stimulator  2010     REMOVE GENERATOR  STIMULATOR (LOCATION) N/A 6/28/2018    Procedure: REMOVE GENERATOR STIMULATOR (LOCATION);  Spinal Cord Stimulator and IPG Explant and Re-Implant of SCS System (Leads and IPG);  Surgeon: Elvis Sauceda MD;  Location: [facility] OR     SURGICAL HISTORY OF -   1/02    abcess tooth     SURGICAL HISTORY OF -   1999    L4-5 laminectomy, cauda equina syndrome     SURGICAL HISTORY OF -   +    herniated disk repair     TONSILLECTOMY  10 1964     TRANSPOSITION ULNAR NERVE (ELBOW)      right       Social History   Substance Use Topics     Smoking status: Former Smoker     Packs/day: 0.50     Years: 1.00     Types: Cigarettes     Start date: 10/13/1983     Quit date: 6/9/1984     Smokeless tobacco: Never Used     Alcohol use No      Comment: a pint of alcohol / day - last drink was 3/28/14     Family History   Problem Relation Age of Onset     Cancer Mother      lung     Breast Cancer Mother      Other Cancer Mother      Cancer Father      esophogeal, GERD     Diabetes Brother      amputation, Type 1     Diabetes Brother      Diabetes Brother      Cancer - colorectal No family hx of          Current Outpatient Prescriptions   Medication Sig Dispense Refill     ACE/ARB NOT PRESCRIBED, INTENTIONAL, ACE & ARB not prescribed due to Allergy       albuterol (PROAIR HFA/PROVENTIL HFA/VENTOLIN HFA) 108 (90 Base) MCG/ACT Inhaler Inhale 2 puffs into the lungs every 6 hours as needed for shortness of breath / dyspnea or wheezing Ventolin or other covered brand 3 Inhaler 3     Alcohol Swabs (ALCOHOL PADS) 70 % PADS 1 Box 4 times daily 100 each 3     ammonium lactate (LAC-HYDRIN) 12 % cream Apply topically 2 times daily as needed for dry skin 385 g 3     baclofen (LIORESAL) 10 MG tablet TAKE 2 TABLETS BY MOUTH THREE TIMES DAILY 180 tablet 11     BASAGLAR 100 UNIT/ML injection Inject 26 Units Subcutaneous daily 30 mL 11     BD ROSE U/F 32G X 4 MM insulin pen needle USE ONCE DAILY AS DIRECTED 100 each 11     blood glucose  "monitoring (NO BRAND SPECIFIED) test strip Use to test blood sugars 1 time daily. ACCU-CHEK KARISHMA BRAND PLEASE. 100 strip 3     blood glucose monitoring (NO BRAND SPECIFIED) test strip Use to test blood sugars one time daily or as directed    Accucheck Richards plus test strips 1 Box 5     blood glucose monitoring (SOFTCLIX) lancets Use to test blood sugar 1 time daily or as directed. 100 each 3     Blood Glucose Monitoring Suppl (ACURA BLOOD GLUCOSE METER) W/DEVICE KIT 1 Dose 2 times daily 1 kit 1     hydrochlorothiazide (HYDRODIURIL) 25 MG tablet TAKE 1 TABLET (25 MG) BY MOUTH DAILY (Patient taking differently: AM) 90 tablet 3     mupirocin (BACTROBAN) 2 % ointment Apply to anterior nares BID X 2 month supply (Patient taking differently: Apply topically as needed Apply to anterior nares BID X 2 month supply) 2 g 3     omeprazole (PRILOSEC) 40 MG capsule Take 1 capsule (40 mg) by mouth daily Take 30-60 minutes before a meal. 90 capsule 3     order for DME Equipment being ordered: wound care supplies  CONVATEC DUODERM  Extra Thin Dressing - 1 1/4\" x 1 1/2\", Spots, oval  [id number]  Wound measurements  3 cm x 3 cm x 0.1 cm lesions with wound area of 1.5 x 1.5 that has slight drainage, full thickness located on the right sacral region 1 Box 3     order for DME   Location:sacral ulcer unknown etiology possible sheer    measurement: 2.5 cm x 2 cm x 0.1 cm Unlikely to be a  pressure ulcer but if it is it's a stg 2,  partial thickness, low eexudate    Wound supplies :  5 each Comfeel  Plus Transparent 3530 2 x 2 3/4''  5x7 cm  [facility]     wound care orders: cleans wound, dry thoroughly  and replace Comfeel. Leave in place for 7 days for 5 weeks 5 each 3     order for DME Equipment being ordered: one donut for sitting 1 Device 0     ORDER FOR DME Equipment being ordered: BP cuff 1 Device 0     oxyCODONE IR (ROXICODONE) 10 MG tablet Take 1 tablet (10 mg) by mouth every 6 hours as needed for moderate to severe pain 40 tablet " 0     promethazine (PHENERGAN) 25 MG tablet TAKE 1 TABLET (25 MG) BY MOUTH EVERY 6 HOURS AS NEEDED FOR NAUSEA 40 tablet 11     propranolol HCl 60 MG TABS Take 1 tablet (60 mg) by mouth 2 times daily 180 tablet 3     sertraline (ZOLOFT) 100 MG tablet Take 2 tablets (200 mg) by mouth daily (Patient taking differently: Take 200 mg by mouth every morning ) 180 tablet 1     STATIN NOT PRESCRIBED, INTENTIONAL, Please choose reason not prescribed, below       Syringe, Disposable, 25 ML MISC Syringe to be used for nasal irrigation 1 each 3     [DISCONTINUED] propranolol (INDERAL) 20 MG tablet TAKE 1 TABLET (20 MG) BY MOUTH 3 TIMES DAILY 90 tablet 0     [DISCONTINUED] propranolol (INDERAL) 40 MG tablet Take 1 tablet (40 mg) by mouth 2 times daily 180 tablet 1     [DISCONTINUED] propranolol HCl 60 MG TABS Take 1 tablet (60 mg) by mouth 2 times daily 180 tablet 3     Allergies   Allergen Reactions     Lisinopril Anaphylaxis     Swollen tongue; inability to swallow; drooling; hives; swollen face, neck, angioedema     Acetaminophen      Hx of cirrhosis      Amlodipine      Swelling, hives, possible angioedema       Morphine      b/p dropped and arms went numb  Hypotension     Quinolones Hives     Bactrim [Sulfamethoxazole W/Trimethoprim] Rash     Levaquin Swelling and Rash     Swelling in lip and tongue felt thick     Recent Labs   Lab Test  10/09/18   0923  07/11/18   0707  07/02/18   1040  07/01/18   0541   06/18/18   0911   12/20/17   1049   05/15/17   0905   12/11/15   0854   10/05/15   0923   07/03/15   0910   04/26/13   1127   A1C  6.1*   --    --    --    --   6.2*   --   6.4*   --   5.8   < >   --    --    --    < >   --    < >   --    LDL   --    --    --    --    --    --    --    --    --   173*   --   123*   --    --    --   166*   < >   --    HDL   --    --    --    --    --    --    --    --    --   43   --   42   --    --    --   33*   < >   --    TRIG   --    --    --    --    --    --    --    --    --   109    "--   74   --    --    --   105   < >   --    ALT   --   19  21  20   < >  20   < >   --    < >   --    < >  28   < >  24   < >  27   < >   --    CR   --   0.65*  0.58*  0.59*   < >  0.65*   < >  0.68   < >   --    < >   --    < >  0.78   < >  0.67   < >   --    GFRESTIMATED   --   >90  >90  >90   < >  >90   < >  >90   < >   --    < >   --    < >  >90  Non  GFR Calc     < >  >90  Non  GFR Calc     < >   --    GFRESTBLACK   --   >90  >90  >90   < >  >90   < >  >90   < >   --    < >   --    < >  >90   GFR Calc     < >  >90   GFR Calc     < >   --    POTASSIUM   --   3.6  3.2*  3.5   < >  3.2*   < >  3.1*   < >   --    < >  3.9   < >  3.3*   < >  3.2*   < >   --    TSH   --    --    --    --    --    --    --    --    --    --    --    --    --   1.08   --    --    --   0.69    < > = values in this interval not displayed.        ROS:  Constitutional, HEENT, cardiovascular, pulmonary, GI, , musculoskeletal, neuro, skin, endocrine and psych systems are negative, except as otherwise noted.      OBJECTIVE:  /90  Pulse 53  Temp 97.5  F (36.4  C) (Oral)  Resp 20  Ht 5' 11\" (1.803 m)  Wt 226 lb (102.5 kg)  SpO2 99%  BMI 31.52 kg/m2    EXAM:  GENERAL APPEARANCE: healthy, alert and no distress      ASSESSMENT AND PLAN    1. Hypertension goal BP (blood pressure) < 140/90  - Above goal.  - Increase propranolol to 60mg twice daily.   - Follow up one month nurse blood pressue check.     2. Hyperlipidemia Follow-up  - Will do lipid panel today, continue to follow and discuss with his GI team if safe to attempt statin given h/o improving alcoholic cirrhosis.     3. Diabetes Follow-up  - Will do labs today, follow-up as appropriate.     -no ace I due to previous reaction.         I was present with the medical student Dejon Beckman who participated in the service and in the documentation of this note. I have verified the history and personally performed " the physical exam and medical decision making, and have verified the content of the note, which accurately reflects my assessment of the patient and the plan of care.      I spent greater than 1/2 of a 25 minute face to face encounter counseling regarding the rational for the assessment and plan noted above.     Joel Wegener,MD

## 2018-10-11 ENCOUNTER — MYC MEDICAL ADVICE (OUTPATIENT)
Dept: FAMILY MEDICINE | Facility: CLINIC | Age: 58
End: 2018-10-11

## 2018-10-11 DIAGNOSIS — E11.42 TYPE 2 DIABETES MELLITUS WITH DIABETIC POLYNEUROPATHY, WITH LONG-TERM CURRENT USE OF INSULIN (H): Primary | ICD-10-CM

## 2018-10-11 DIAGNOSIS — Z79.4 TYPE 2 DIABETES MELLITUS WITH DIABETIC POLYNEUROPATHY, WITH LONG-TERM CURRENT USE OF INSULIN (H): Primary | ICD-10-CM

## 2018-10-11 DIAGNOSIS — E78.5 HYPERLIPIDEMIA LDL GOAL <100: ICD-10-CM

## 2018-10-11 RX ORDER — ROSUVASTATIN CALCIUM 10 MG/1
10 TABLET, COATED ORAL DAILY
Qty: 90 TABLET | Refills: 3 | Status: SHIPPED | OUTPATIENT
Start: 2018-10-11 | End: 2019-01-17

## 2018-11-23 ENCOUNTER — OFFICE VISIT (OUTPATIENT)
Dept: ANESTHESIOLOGY | Facility: CLINIC | Age: 58
End: 2018-11-23
Payer: MEDICARE

## 2018-11-23 VITALS
DIASTOLIC BLOOD PRESSURE: 82 MMHG | SYSTOLIC BLOOD PRESSURE: 158 MMHG | HEIGHT: 71 IN | RESPIRATION RATE: 16 BRPM | HEART RATE: 53 BPM | BODY MASS INDEX: 32.2 KG/M2 | WEIGHT: 230 LBS

## 2018-11-23 DIAGNOSIS — Z96.89 S/P INSERTION OF SPINAL CORD STIMULATOR: Primary | ICD-10-CM

## 2018-11-23 DIAGNOSIS — M54.17 LUMBOSACRAL RADICULOPATHY: ICD-10-CM

## 2018-11-23 ASSESSMENT — ANXIETY QUESTIONNAIRES
GAD7 TOTAL SCORE: 0
1. FEELING NERVOUS, ANXIOUS, OR ON EDGE: NOT AT ALL
GAD7 TOTAL SCORE: 0
7. FEELING AFRAID AS IF SOMETHING AWFUL MIGHT HAPPEN: NOT AT ALL
2. NOT BEING ABLE TO STOP OR CONTROL WORRYING: NOT AT ALL
6. BECOMING EASILY ANNOYED OR IRRITABLE: NOT AT ALL
GAD7 TOTAL SCORE: 0
4. TROUBLE RELAXING: NOT AT ALL
3. WORRYING TOO MUCH ABOUT DIFFERENT THINGS: NOT AT ALL
5. BEING SO RESTLESS THAT IT IS HARD TO SIT STILL: NOT AT ALL
7. FEELING AFRAID AS IF SOMETHING AWFUL MIGHT HAPPEN: NOT AT ALL

## 2018-11-23 ASSESSMENT — PAIN SCALES - GENERAL: PAINLEVEL: MODERATE PAIN (4)

## 2018-11-23 NOTE — MR AVS SNAPSHOT
After Visit Summary   11/23/2018    Erwin Aguilar    MRN: [id number]           Patient Information     Date Of Birth          1960        Visit Information        Provider Department      11/23/2018 9:00 AM Estrella Ruelas APRN [facility]        Care Instructions    1. Medication recommendations will be written in the Providers Clinical note from today's visit, for your Care Team to review.     2. We will contact [employer] about you meeting with one of their Representatives so that they can evaluate and re-program your device as needed.     Follow up: As indicated.       To speak with a nurse, schedule/reschedule/cancel a clinic appointment, or request a medication refill call: [phone]     You can also reach us by [employer]: [email or web address]    For refills, please call on Monday, 1 week before your medication runs out. The doctors are not always in clinic, so this gives us time to get your prescriptions ready.  Please let us know the name of the medication you are requesting a refill of.                                     Follow-ups after your visit        Your next 10 appointments already scheduled     Jan 14, 2019  8:30 AM CST   (Arrive by 8:15 AM)   Return General Liver with MD ARLIN Mina [facility] ([facility])    [address] [place] [address]  [address] [place] [id number] [phone]              Who to contact     Please call your clinic at [phone] to:    Ask questions about your health    Make or cancel appointments    Discuss your medicines    Learn about your test results    Speak to your doctor            Additional Information About Your Visit        MyChart Information     [employer] gives you secure access to your electronic health record. If you see a primary care provider, you can also send messages to your care team and make appointments. If you have  "questions, please call your primary care clinic.  If you do not have a primary care provider, please call [phone] and they will assist you.      [employer] is an electronic gateway that provides easy, online access to your medical records. With [employer], you can request a clinic appointment, read your test results, renew a prescription or communicate with your care team.     To access your existing account, please contact your [facility] or call [phone] for assistance.        Care EveryWhere ID     This is your Care EveryWhere ID. This could be used by other organizations to access your [place] medical records  [id number]        Your Vitals Were     Pulse Respirations Height BMI (Body Mass Index)          53 16 1.803 m (5' 11\") 32.08 kg/m2         Blood Pressure from Last 3 Encounters:   11/23/18 158/82   10/09/18 140/90   09/17/18 135/82    Weight from Last 3 Encounters:   11/23/18 104.3 kg (230 lb)   10/09/18 102.5 kg (226 lb)   09/17/18 101.8 kg (224 lb 8 oz)              Today, you had the following     No orders found for display         Today's Medication Changes          These changes are accurate as of 11/23/18  9:22 AM.  If you have any questions, ask your nurse or doctor.               These medicines have changed or have updated prescriptions.        Dose/Directions    hydrochlorothiazide 25 MG tablet   Commonly known as:  HYDRODIURIL   This may have changed:  See the new instructions.   Used for:  Hypertension goal BP (blood pressure) < 140/90        TAKE 1 TABLET (25 MG) BY MOUTH DAILY   Quantity:  90 tablet   Refills:  3       mupirocin 2 % ointment   Commonly known as:  BACTROBAN   This may have changed:    - how to take this  - when to take this  - reasons to take this  - additional instructions   Used for:  Nasal lesion, Nasal vestibulitis        Apply to anterior nares BID X 2 month supply   Quantity:  2 g   Refills:  3       sertraline 100 MG tablet "   Commonly known as:  ZOLOFT   This may have changed:  when to take this   Used for:  Generalized anxiety disorder        Dose:  200 mg   Take 2 tablets (200 mg) by mouth daily   Quantity:  180 tablet   Refills:  1                Primary Care Provider Office Phone # Fax #    Demario Daniel Irwin Wegener, [phone] [id number]  [facility] [id number]        Equal Access to Services     DEANGELORENATO BIRGIT : Hadii aad ku hadasho Soomaali, waaxda luqadaha, qaybta kaalmada adeegyada, waxay idiin hayaan adeeg kharash la'aan ah. So [facility] [phone].    ATENCIÓN: Si habla espswapnil, tiene a camp disposición servicios gratuitos de asistencia lingüística. Llame al [phone].    We comply with applicable federal civil rights laws and [place] laws. We do not discriminate on the basis of race, color, national origin, age, disability, sex, sexual orientation, or gender identity.            Thank you!     Thank you for choosing [facility]  for your care. Our goal is always to provide you with excellent care. Hearing back from our patients is one way we can continue to improve our services. Please take a few minutes to complete the written survey that you may receive in the mail after your visit with us. Thank you!             Your Updated Medication List - Protect others around you: Learn how to safely use, store and throw away your medicines at www.disposemymeds.org.          This list is accurate as of 11/23/18  9:22 AM.  Always use your most recent med list.                   Brand Name Dispense Instructions for use Diagnosis    * ACE/ARB/ARNI NOT PRESCRIBED (INTENTIONAL)      ACE & ARB not prescribed due to Allergy        ACURA BLOOD GLUCOSE METER w/Device Kit     1 kit    1 Dose 2 times daily    Type 2 diabetes, HbA1c goal < 7% (H)       albuterol 108 (90 Base) MCG/ACT inhaler    PROAIR HFA/PROVENTIL HFA/VENTOLIN HFA    3 Inhaler    Inhale 2 puffs into the lungs every 6  hours as needed for shortness of breath / dyspnea or wheezing Ventolin or other covered brand    Moderate persistent asthma without complication       Alcohol Pads 70 % Pads     100 each    1 Box 4 times daily    Type 2 diabetes, HbA1c goal < 7% (H)       ammonium lactate 12 % cream    LAC-HYDRIN    385 g    Apply topically 2 times daily as needed for dry skin    Xerosis cutis       baclofen 10 MG tablet    LIORESAL    180 tablet    TAKE 2 TABLETS BY MOUTH THREE TIMES DAILY    Muscle spasms of both lower extremities, Back muscle spasm       BD ROSE U/F 32G X 4 MM miscellaneous   Generic drug:  insulin pen needle     100 each    USE ONCE DAILY AS DIRECTED    Type 2 diabetes mellitus without complication, with long-term current use of insulin (H)       blood glucose monitoring lancets     100 each    Use to test blood sugar 1 time daily or as directed.    Type 2 diabetes, HbA1c goal < 7% (H)       * blood glucose monitoring test strip    no brand specified    1 Box    Use to test blood sugars one time daily or as directed  Accucheck Richards plus test strips    Type 2 diabetes, HbA1c goal < 7% (H)       * blood glucose monitoring test strip    no brand specified    100 strip    Use to test blood sugars 1 time daily. ACCU-CHEK KARISHMA BRAND PLEASE.    Type 2 diabetes mellitus with diabetic polyneuropathy, with long-term current use of insulin (H)       hydrochlorothiazide 25 MG tablet    HYDRODIURIL    90 tablet    TAKE 1 TABLET (25 MG) BY MOUTH DAILY    Hypertension goal BP (blood pressure) < 140/90       insulin glargine 100 UNIT/ML pen     30 mL    Inject 26 Units Subcutaneous daily    Type 2 diabetes mellitus without complication, with long-term current use of insulin (H)       mupirocin 2 % ointment    BACTROBAN    2 g    Apply to anterior nares BID X 2 month supply    Nasal lesion, Nasal vestibulitis       omeprazole 40 MG capsule    priLOSEC    90 capsule    Take 1 capsule (40 mg) by mouth daily Take 30-60 minutes  "before a meal.    Gastroesophageal reflux disease without esophagitis       * order for DME     1 Device    Equipment being ordered: BP cuff    Hypertension goal BP (blood pressure) < 140/90       * order for DME     1 Device    Equipment being ordered: one donut for sitting    Pressure ulcer, stage II       * order for DME     5 each    ?Location:sacral ulcer unknown etiology possible sheer ?measurement: 2.5 cm x 2 cm x 0.1 cm Unlikely to be a  pressure ulcer but if it is it's a stg 2,  partial thickness, low eexudate  Wound supplies : 5 each Comfeel? Plus [id number] x 2 3/4''  5x7 cm  [facility]   wound care orders: cleans wound, dry thoroughly  and replace Comfeel. Leave in place for 7 days for 5 weeks    Wound, open, buttock, right, initial encounter       * order for DME     1 Box    Equipment being ordered: wound care supplies CONVATEC DUODERM  Extra Thin Dressing - 1 1/4\" x 1 1/2\", Spots, oval [id number] Wound measurements 3 cm x 3 cm x 0.1 cm lesions with wound area of 1.5 x 1.5 that has slight drainage, full thickness located on the right sacral region    Benign neoplasm of skin of trunk, except scrotum       oxyCODONE IR 10 MG tablet    ROXICODONE    40 tablet    Take 1 tablet (10 mg) by mouth every 6 hours as needed for moderate to severe pain    Post laminectomy syndrome       promethazine 25 MG tablet    PHENERGAN    40 tablet    TAKE 1 TABLET (25 MG) BY MOUTH EVERY 6 HOURS AS NEEDED FOR NAUSEA    Nausea without vomiting       propranolol HCl 60 MG Tabs     180 tablet    Take 1 tablet (60 mg) by mouth 2 times daily    Hypertension goal BP (blood pressure) < 140/90       rosuvastatin 10 MG tablet    CRESTOR    90 tablet    Take 1 tablet (10 mg) by mouth daily    Type 2 diabetes mellitus with diabetic polyneuropathy, with long-term current use of insulin (H), Hyperlipidemia LDL goal <100       sertraline 100 MG tablet    ZOLOFT    180 tablet    Take 2 tablets (200 mg) by mouth daily    Generalized " anxiety disorder       STATIN NOT PRESCRIBED (INTENTIONAL)      Please choose reason not prescribed, below    Type 2 diabetes mellitus without complication, with long-term current use of insulin (H), Alcoholic cirrhosis of liver without ascites (H)       Syringe (Disposable) 25 ML Misc     1 each    Syringe to be used for nasal irrigation    Chronic maxillary sinusitis       * Notice:  This list has 7 medication(s) that are the same as other medications prescribed for you. Read the directions carefully, and ask your doctor or other care provider to review them with you.

## 2018-11-23 NOTE — LETTER
"11/23/2018       RE: Erwin Aguilar  [address]  [place] [address]     Dear Colleague,    Thank you for referring your patient, Erwin Aguilar, to the [facility] at [facility]. Please see a copy of my visit note below.    Date of visit: 11/23/2018     Chief complaint:        Chief Complaint   Patient presents with     Pain Management       Follow up          Interval history:  Erwin Aguilar is a 57 year old male last seen by me on 7/27/18.  Erwin is a 57-year-old male who presents to the pain clinic as a follow-up patient after his spinal cord stimulator explant and reimplantation surgery with [employer]. He underwent the surgery on June 28 and his GABRIELA drain was pulled on July 2, 2018, while he was an inpatient at the [facility].     He was doing well at one-week postoperative visit and continues to be doing well today. He is extremely pleased with the SCS overall. When last seen, he had stated he has no \"nerve pain\" whatsoever and was obtaining complete coverage. Today, he reports that he has to charge his battery daily and is concerned that he is 'overusing' his stimulator. He does have some persistent shooting symptoms to his lateral right foot, Achilles area, and right groin. He notes these pains have been persistent and chronic; he does not feel they are paraesthesias 2/2 overstimulation from his device. He also mentions that he is taking baclofen 60 mg/day and does not feel it is helping with these pains. He has been on gabapentin in the past; does not recall result with use.      Medications:   Current Outpatient Prescriptions           Current Outpatient Prescriptions   Medication Sig Dispense Refill     ACE/ARB NOT PRESCRIBED, INTENTIONAL, ACE & ARB not prescribed due to Allergy         albuterol (PROAIR HFA/PROVENTIL HFA/VENTOLIN HFA) 108 (90 Base) MCG/ACT Inhaler Inhale 2 puffs into the lungs every 6 " "hours as needed for shortness of breath / dyspnea or wheezing Ventolin or other covered brand 3 Inhaler 3     Alcohol Swabs (ALCOHOL PADS) 70 % PADS 1 Box 4 times daily 100 each 3     ammonium lactate (LAC-HYDRIN) 12 % cream Apply topically 2 times daily as needed for dry skin 385 g 3     BASAGLAR 100 UNIT/ML injection Inject 26 Units Subcutaneous daily 30 mL 11     BD ROSE U/F 32G X 4 MM insulin pen needle USE ONCE DAILY AS DIRECTED 100 each 11     blood glucose monitoring (NO BRAND SPECIFIED) test strip Use to test blood sugars 1 time daily. ACCU-CHEK KARISHMA BRAND PLEASE. 100 strip 3     blood glucose monitoring (NO BRAND SPECIFIED) test strip Use to test blood sugars one time daily or as directed     Accucheck Richards plus test strips 1 Box 5     blood glucose monitoring (SOFTCLIX) lancets Use to test blood sugar 1 time daily or as directed. 100 each 3     Blood Glucose Monitoring Suppl (ACURA BLOOD GLUCOSE METER) W/DEVICE KIT 1 Dose 2 times daily 1 kit 1     hydrochlorothiazide (HYDRODIURIL) 25 MG tablet TAKE 1 TABLET (25 MG) BY MOUTH DAILY (Patient taking differently: AM) 90 tablet 3     mupirocin (BACTROBAN) 2 % ointment Apply to anterior nares BID X 2 month supply (Patient taking differently: Apply topically as needed Apply to anterior nares BID X 2 month supply) 2 g 3     order for DME Equipment being ordered: wound care supplies  CONVATEC DUODERM  Extra Thin Dressing - 1 1/4\" x 1 1/2\", Spots, oval  [id number]  Wound measurements  3 cm x 3 cm x 0.1 cm lesions with wound area of 1.5 x 1.5 that has slight drainage, full thickness located on the right sacral region 1 Box 3     order for DME                        Location:sacral ulcer unknown etiology possible sheer                         measurement: 2.5 cm x 2 cm x 0.1 cm Unlikely to be a  pressure ulcer but if it is it's a stg 2,  partial thickness, low eexudate     Wound supplies :  5 each Comfeel  Plus Transparent                      3530                2 x " "2 3/4''  5x7 cm  [facility]      wound care orders: cleans wound, dry thoroughly  and replace Comfeel. Leave in place for 7 days for 5 weeks 5 each 3     ORDER FOR DME Equipment being ordered: BP cuff 1 Device 0     oxyCODONE IR (ROXICODONE) 10 MG tablet Take 1 tablet (10 mg) by mouth every 6 hours as needed for moderate to severe pain 40 tablet 0     promethazine (PHENERGAN) 25 MG tablet TAKE 1 TABLET (25 MG) BY MOUTH EVERY 6 HOURS AS NEEDED FOR NAUSEA 40 tablet 11     propranolol (INDERAL) 20 MG tablet TAKE 1 TABLET (20 MG) BY MOUTH 3 TIMES DAILY 90 tablet 0     propranolol (INDERAL) 40 MG tablet Take 1 tablet (40 mg) by mouth 2 times daily 180 tablet 1     sertraline (ZOLOFT) 100 MG tablet Take 2 tablets (200 mg) by mouth daily (Patient taking differently: Take 200 mg by mouth every morning ) 180 tablet 1     Syringe, Disposable, 25 ML MISC Syringe to be used for nasal irrigation 1 each 3     baclofen (LIORESAL) 10 MG tablet TAKE 2 TABLETS BY MOUTH THREE TIMES DAILY (Patient not taking: Reported on 7/24/2018) 180 tablet 11     omeprazole (PRILOSEC) 40 MG capsule Take 1 capsule (40 mg) by mouth daily Take 30-60 minutes before a meal. (Patient not taking: Reported on 7/24/2018) 90 capsule 3     order for DME Equipment being ordered: one donut for sitting (Patient not taking: Reported on 7/24/2018) 1 Device 0            Medical History: any changes in medical history since they were last seen? No     Review of Systems:  The 14 system ROS was reviewed from the intake questionnaire; results listed at end of note.      Physical Exam:  Blood pressure 153/78, pulse 56, resp. rate 16, height 1.803 m (5' 11\"), weight 95.7 kg (211 lb).  General: NAD  Gait:                Stable; use of cane.   Skin: Surgical and GABRIELA sites well healed and fully epithelialized. No signs of infection: no redness, swelling, drainage.       Assessment:   Erwin Aguilar is a 57 year old male who is seen at the pain clinic for 5-month " post-operative spinal cord stimulator reimplantation.     Plan:  Will assist in coordinating reprogramming with [employer] for SCS optimization. If this is not successful for alleviation of right LE radicular symptoms, would consider initiating gabapentin 300 mg with titration to t.i.d. As tolerated. Informed patient that I am happy to manage this for patient, his primary care provider may also prescribe if of greater convenience for patient.       Total time spent was 10 minutes, and more than 50% of face to face time was spent in counseling and/or coordination of care regarding plan of care.      Again, thank you for allowing me to participate in the care of your patient.      Sincerely,    VANCE Benz CNP

## 2018-11-23 NOTE — NURSING NOTE
LPN reviewed AVS with Pt.  Pt verbalized an understanding of information, and was asked to contact clinic with questions.    Kathleen Ryan LPN

## 2018-11-23 NOTE — PROGRESS NOTES
"Date of visit: 11/23/2018     Chief complaint:        Chief Complaint   Patient presents with     Pain Management       Follow up          Interval history:  Erwin Aguilar is a 57 year old male last seen by me on 7/27/18.  Erwin is a 57-year-old male who presents to the pain clinic as a follow-up patient after his spinal cord stimulator explant and reimplantation surgery with [employer]. He underwent the surgery on June 28 and his GABRIELA drain was pulled on July 2, 2018, while he was an inpatient at the [facility].     He was doing well at one-week postoperative visit and continues to be doing well today. He is extremely pleased with the SCS overall. When last seen, he had stated he has no \"nerve pain\" whatsoever and was obtaining complete coverage. Today, he reports that he has to charge his battery daily and is concerned that he is 'overusing' his stimulator. He does have some persistent shooting symptoms to his lateral right foot, Achilles area, and right groin. He notes these pains have been persistent and chronic; he does not feel they are paraesthesias 2/2 overstimulation from his device. He also mentions that he is taking baclofen 60 mg/day and does not feel it is helping with these pains. He has been on gabapentin in the past; does not recall result with use.      Medications:   Current Outpatient Prescriptions           Current Outpatient Prescriptions   Medication Sig Dispense Refill     ACE/ARB NOT PRESCRIBED, INTENTIONAL, ACE & ARB not prescribed due to Allergy         albuterol (PROAIR HFA/PROVENTIL HFA/VENTOLIN HFA) 108 (90 Base) MCG/ACT Inhaler Inhale 2 puffs into the lungs every 6 hours as needed for shortness of breath / dyspnea or wheezing Ventolin or other covered brand 3 Inhaler 3     Alcohol Swabs (ALCOHOL PADS) 70 % PADS 1 Box 4 times daily 100 each 3     ammonium lactate (LAC-HYDRIN) 12 % cream Apply topically 2 times daily as needed for dry skin 385 g 3     BASAGLAR 100 UNIT/ML " "injection Inject 26 Units Subcutaneous daily 30 mL 11     BD ROSE U/F 32G X 4 MM insulin pen needle USE ONCE DAILY AS DIRECTED 100 each 11     blood glucose monitoring (NO BRAND SPECIFIED) test strip Use to test blood sugars 1 time daily. ACCU-CHEK KARISHMA BRAND PLEASE. 100 strip 3     blood glucose monitoring (NO BRAND SPECIFIED) test strip Use to test blood sugars one time daily or as directed     Accucheck Richards plus test strips 1 Box 5     blood glucose monitoring (SOFTCLIX) lancets Use to test blood sugar 1 time daily or as directed. 100 each 3     Blood Glucose Monitoring Suppl (ACURA BLOOD GLUCOSE METER) W/DEVICE KIT 1 Dose 2 times daily 1 kit 1     hydrochlorothiazide (HYDRODIURIL) 25 MG tablet TAKE 1 TABLET (25 MG) BY MOUTH DAILY (Patient taking differently: AM) 90 tablet 3     mupirocin (BACTROBAN) 2 % ointment Apply to anterior nares BID X 2 month supply (Patient taking differently: Apply topically as needed Apply to anterior nares BID X 2 month supply) 2 g 3     order for DME Equipment being ordered: wound care supplies  CONVATEC DUODERM  Extra Thin Dressing - 1 1/4\" x 1 1/2\", Spots, oval  [id number]  Wound measurements  3 cm x 3 cm x 0.1 cm lesions with wound area of 1.5 x 1.5 that has slight drainage, full thickness located on the right sacral region 1 Box 3     order for DME                        Location:sacral ulcer unknown etiology possible sheer                         measurement: 2.5 cm x 2 cm x 0.1 cm Unlikely to be a  pressure ulcer but if it is it's a stg 2,  partial thickness, low eexudate     Wound supplies :  5 each Comfeel  Plus Transparent                      3530                2 x 2 3/4''  5x7 cm  [facility]      wound care orders: cleans wound, dry thoroughly  and replace Comfeel. Leave in place for 7 days for 5 weeks 5 each 3     ORDER FOR DME Equipment being ordered: BP cuff 1 Device 0     oxyCODONE IR (ROXICODONE) 10 MG tablet Take 1 tablet (10 mg) by mouth every 6 hours as needed " "for moderate to severe pain 40 tablet 0     promethazine (PHENERGAN) 25 MG tablet TAKE 1 TABLET (25 MG) BY MOUTH EVERY 6 HOURS AS NEEDED FOR NAUSEA 40 tablet 11     propranolol (INDERAL) 20 MG tablet TAKE 1 TABLET (20 MG) BY MOUTH 3 TIMES DAILY 90 tablet 0     propranolol (INDERAL) 40 MG tablet Take 1 tablet (40 mg) by mouth 2 times daily 180 tablet 1     sertraline (ZOLOFT) 100 MG tablet Take 2 tablets (200 mg) by mouth daily (Patient taking differently: Take 200 mg by mouth every morning ) 180 tablet 1     Syringe, Disposable, 25 ML MISC Syringe to be used for nasal irrigation 1 each 3     baclofen (LIORESAL) 10 MG tablet TAKE 2 TABLETS BY MOUTH THREE TIMES DAILY (Patient not taking: Reported on 7/24/2018) 180 tablet 11     omeprazole (PRILOSEC) 40 MG capsule Take 1 capsule (40 mg) by mouth daily Take 30-60 minutes before a meal. (Patient not taking: Reported on 7/24/2018) 90 capsule 3     order for DME Equipment being ordered: one donut for sitting (Patient not taking: Reported on 7/24/2018) 1 Device 0            Medical History: any changes in medical history since they were last seen? No     Review of Systems:  The 14 system ROS was reviewed from the intake questionnaire; results listed at end of note.      Physical Exam:  Blood pressure 153/78, pulse 56, resp. rate 16, height 1.803 m (5' 11\"), weight 95.7 kg (211 lb).  General: NAD  Gait:                Stable; use of cane.   Skin: Surgical and GABRIELA sites well healed and fully epithelialized. No signs of infection: no redness, swelling, drainage.       Assessment:   Erwin Aguilar is a 57 year old male who is seen at the pain clinic for 5-month post-operative spinal cord stimulator reimplantation.     Plan:  Will assist in coordinating reprogramming with [employer] for SCS optimization. If this is not successful for alleviation of right LE radicular symptoms, would consider initiating gabapentin 300 mg with titration to t.i.d. As tolerated. Informed patient that " I am happy to manage this for patient, his primary care provider may also prescribe if of greater convenience for patient.       Total time spent was 10 minutes, and more than 50% of face to face time was spent in counseling and/or coordination of care regarding plan of care.     VANCE Benz, [facility]-BC  Pain Management  Department of Interventional Pain Management and Anesthesiology   [facility]       Answers for HPI/ROS submitted by the patient on 11/23/2018   JATIN 7 TOTAL SCORE: 0  PHQ-2 Score: 0

## 2018-11-23 NOTE — PATIENT INSTRUCTIONS
1. Medication recommendations will be written in the Providers Clinical note from today's visit, for your Care Team to review.     2. We will contact [employer] about you meeting with one of their Representatives so that they can evaluate and re-program your device as needed.     Follow up: As indicated.       To speak with a nurse, schedule/reschedule/cancel a clinic appointment, or request a medication refill call: [phone]     You can also reach us by [employer]: [email or web address]    For refills, please call on Monday, 1 week before your medication runs out. The doctors are not always in clinic, so this gives us time to get your prescriptions ready.  Please let us know the name of the medication you are requesting a refill of.

## 2018-11-24 ASSESSMENT — ANXIETY QUESTIONNAIRES: GAD7 TOTAL SCORE: 0

## 2018-12-04 ENCOUNTER — APPOINTMENT (OUTPATIENT)
Dept: ANESTHESIOLOGY | Facility: CLINIC | Age: 58
End: 2018-12-04
Payer: MEDICARE

## 2018-12-05 DIAGNOSIS — F41.1 GENERALIZED ANXIETY DISORDER: ICD-10-CM

## 2018-12-05 DIAGNOSIS — R11.0 NAUSEA WITHOUT VOMITING: ICD-10-CM

## 2018-12-05 NOTE — TELEPHONE ENCOUNTER
"Requested Prescriptions   Pending Prescriptions Disp Refills     promethazine (PHENERGAN) 25 MG tablet [Pharmacy Med Name: PROMETHAZINE 25 MG TABLET]  Last Written Prescription Date:  7/6/2018  Last Fill Quantity: 40 tablet,  # refills: 11   Last Office Visit: 10/9/2018   Future Office Visit:      40 tablet 11     Sig: TAKE 1 TABLET (25 MG) BY MOUTH EVERY 6 HOURS AS NEEDED FOR NAUSEA     Antivertigo/Antiemetic Agents Passed    12/5/2018 12:03 PM       Passed - Recent (12 mo) or future (30 days) visit within the authorizing provider's specialty    Patient had office visit in the last 12 months or has a visit in the next 30 days with authorizing provider or within the authorizing provider's specialty.  See \"Patient Info\" tab in inbasket, or \"Choose Columns\" in Meds & Orders section of the refill encounter.           Passed - Patient is 18 years of age or older     _________________________________________________________________________________________________________________________       sertraline (ZOLOFT) 100 MG tablet [Pharmacy Med Name: SERTRALINE  MG TABLET]  Last Written Prescription Date:  3/28/2017  Last Fill Quantity: 180 tablet,  # refills: 1   Last Office Visit: 10/9/2018   Future Office Visit:      180 tablet 1     Sig: TAKE 2 TABLETS BY MOUTH EVERY DAY    SSRIs Protocol Passed    12/5/2018 12:03 PM       Passed - Recent (12 mo) or future (30 days) visit within the authorizing provider's specialty    Patient had office visit in the last 12 months or has a visit in the next 30 days with authorizing provider or within the authorizing provider's specialty.  See \"Patient Info\" tab in inbasket, or \"Choose Columns\" in Meds & Orders section of the refill encounter.           Passed - Patient is age 18 or older          "

## 2018-12-06 DIAGNOSIS — R11.0 NAUSEA WITHOUT VOMITING: ICD-10-CM

## 2018-12-06 NOTE — TELEPHONE ENCOUNTER
"Requested Prescriptions   Pending Prescriptions Disp Refills     promethazine (PHENERGAN) 25 MG tablet [Pharmacy Med Name: PROMETHAZINE 25 MG TABLET]  Last Written Prescription Date:  7/6/2018  Last Fill Quantity: 40 tabs,  # refills: 11   Last office visit: 10/9/2018 with prescribing provider:  Wegener   Future Office Visit:     40 tablet 11     Sig: TAKE 1 TABLET (25 MG) BY MOUTH EVERY 6 HOURS AS NEEDED FOR NAUSEA     Antivertigo/Antiemetic Agents Passed    12/6/2018 12:36 PM       Passed - Recent (12 mo) or future (30 days) visit within the authorizing provider's specialty    Patient had office visit in the last 12 months or has a visit in the next 30 days with authorizing provider or within the authorizing provider's specialty.  See \"Patient Info\" tab in inbasket, or \"Choose Columns\" in Meds & Orders section of the refill encounter.             Passed - Patient is 18 years of age or older          "

## 2018-12-07 ENCOUNTER — MYC MEDICAL ADVICE (OUTPATIENT)
Dept: FAMILY MEDICINE | Facility: CLINIC | Age: 58
End: 2018-12-07

## 2018-12-07 DIAGNOSIS — K70.30 ALCOHOLIC CIRRHOSIS OF LIVER WITHOUT ASCITES (H): Primary | ICD-10-CM

## 2018-12-07 RX ORDER — SERTRALINE HYDROCHLORIDE 100 MG/1
TABLET, FILM COATED ORAL
Qty: 60 TABLET | Refills: 0 | Status: SHIPPED | OUTPATIENT
Start: 2018-12-07 | End: 2019-01-03

## 2018-12-07 RX ORDER — PROMETHAZINE HYDROCHLORIDE 25 MG/1
TABLET ORAL
Qty: 40 TABLET | Refills: 0 | Status: SHIPPED | OUTPATIENT
Start: 2018-12-07 | End: 2019-06-07

## 2018-12-07 NOTE — TELEPHONE ENCOUNTER
Dr Wegener  Per the med rec-   this pt has filled this medication, phenergan, 27 times in 12 months  He received 11 refills 7/18 for #40 and is out of refills.     Please advise    Thanks!     Alice Baca RN

## 2018-12-07 NOTE — TELEPHONE ENCOUNTER
Pt was suppose to f/u In October with PCP . This has not been done. Refilled for 30 days only.   [email or web address] sent to pt to make appt  Ailin Galeano RN, BSN

## 2018-12-10 ENCOUNTER — MYC MEDICAL ADVICE (OUTPATIENT)
Dept: FAMILY MEDICINE | Facility: CLINIC | Age: 58
End: 2018-12-10

## 2018-12-10 NOTE — TELEPHONE ENCOUNTER
Please call pt to get more info.     Mr. Aguilar - looks like needing a lot of phenergan 27 fills in 12 months.  Are you using for nausea?  Is there anything else we can do to help nausea?      I just don't want to overlook something.     We could increase the dispense amount if needed to #120 so he doesn't have to fill so often.     Joel Wegener,MD

## 2018-12-11 RX ORDER — PROMETHAZINE HYDROCHLORIDE 25 MG/1
TABLET ORAL
Qty: 120 TABLET | Refills: 11 | Status: SHIPPED | OUTPATIENT
Start: 2018-12-11 | End: 2019-06-12

## 2018-12-11 NOTE — TELEPHONE ENCOUNTER
Phone call to patient     Discussed promethazine - quantity of refills     Patient states he is using almost every day 1-2 x per day for nausea   No reported emesis   Patient is NOT taking omeprazole that is listed on his med list     Patient has alcoholic cirrhosis and quit drinking approx 4 years ago but follows closely with GI - ultrasound every 6 months     Patient states he had back surgery this past summer and some teeth removed and has had continued nausea     Patient follows up with gastro and has an appt. Coming up with gastro and labs/imaging     Patient has an appt. with Wegener, Joel Daniel Irwin 12/21/18    Pended increase in quantity per pcp note below to   # 120     Sugar Saleem Registered Nurse   [facility]

## 2018-12-21 ENCOUNTER — OFFICE VISIT (OUTPATIENT)
Dept: FAMILY MEDICINE | Facility: CLINIC | Age: 58
End: 2018-12-21
Payer: MEDICARE

## 2018-12-21 VITALS
OXYGEN SATURATION: 98 % | SYSTOLIC BLOOD PRESSURE: 161 MMHG | BODY MASS INDEX: 31.64 KG/M2 | RESPIRATION RATE: 18 BRPM | TEMPERATURE: 97.9 F | DIASTOLIC BLOOD PRESSURE: 82 MMHG | HEART RATE: 54 BPM | HEIGHT: 71 IN | WEIGHT: 226 LBS

## 2018-12-21 DIAGNOSIS — J01.90 ACUTE SINUSITIS WITH SYMPTOMS > 10 DAYS: Primary | ICD-10-CM

## 2018-12-21 DIAGNOSIS — Z79.4 TYPE 2 DIABETES MELLITUS WITH DIABETIC POLYNEUROPATHY, WITH LONG-TERM CURRENT USE OF INSULIN (H): ICD-10-CM

## 2018-12-21 DIAGNOSIS — E11.42 TYPE 2 DIABETES MELLITUS WITH DIABETIC POLYNEUROPATHY, WITH LONG-TERM CURRENT USE OF INSULIN (H): ICD-10-CM

## 2018-12-21 DIAGNOSIS — I10 HYPERTENSION GOAL BP (BLOOD PRESSURE) < 140/90: ICD-10-CM

## 2018-12-21 DIAGNOSIS — I85.10 ESOPHAGEAL VARICES IN CIRRHOSIS (H): ICD-10-CM

## 2018-12-21 DIAGNOSIS — R11.0 NAUSEA: ICD-10-CM

## 2018-12-21 DIAGNOSIS — K74.60 ESOPHAGEAL VARICES IN CIRRHOSIS (H): ICD-10-CM

## 2018-12-21 PROCEDURE — 99214 OFFICE O/P EST MOD 30 MIN: CPT | Performed by: FAMILY MEDICINE

## 2018-12-21 RX ORDER — MUPIROCIN 20 MG/G
OINTMENT TOPICAL 3 TIMES DAILY
Qty: 30 G | Refills: 1 | Status: SHIPPED | OUTPATIENT
Start: 2018-12-21 | End: 2019-06-24

## 2018-12-21 RX ORDER — CLINDAMYCIN HCL 150 MG
150 CAPSULE ORAL 3 TIMES DAILY
Qty: 21 CAPSULE | Refills: 0 | Status: SHIPPED | OUTPATIENT
Start: 2018-12-21 | End: 2019-04-02

## 2018-12-21 RX ORDER — SPIRONOLACTONE 50 MG/1
50 TABLET, FILM COATED ORAL DAILY
Qty: 90 TABLET | Refills: 3 | Status: SHIPPED | OUTPATIENT
Start: 2018-12-21 | End: 2019-10-08

## 2018-12-21 ASSESSMENT — MIFFLIN-ST. JEOR: SCORE: 1862.26

## 2018-12-21 NOTE — PATIENT INSTRUCTIONS
"ASSESSMENT AND PLAN  1. Acute sinusitis with symptoms > 10 days  Treat with clindamycin and mupirocin for nose.     - clindamycin (CLEOCIN) 150 MG capsule; Take 1 capsule (150 mg) by mouth 3 times daily for 7 days  Dispense: 21 capsule; Refill: 0  - mupirocin (BACTROBAN) 2 % external ointment; Apply topically 3 times daily for 7 days  Dispense: 30 g; Refill: 1    2. Esophageal varices in cirrhosis (H)    - spironolactone (ALDACTONE) 50 MG tablet; Take 1 tablet (50 mg) by mouth daily  Dispense: 90 tablet; Refill: 3    3. Hypertension goal BP (blood pressure) < 140/90  Add spironolactone, continue propranolol 40mg twice daily for now.   - spironolactone (ALDACTONE) 50 MG tablet; Take 1 tablet (50 mg) by mouth daily  Dispense: 90 tablet; Refill: 3    4. Type 2 diabetes mellitus with diabetic polyneuropathy, with long-term current use of insulin (H)  Follow up end of January.  I placed a1c order to do with GI specialist labs if desired .     5. Nausea  Please be sure to discuss further with Dr. Horn.  Consider gastroparesis.  Discuss if ct scan needed (has had recent ultrasond and upper endoscopy) although I do not feel needed.             [employer] FOR ON-LINE CARE(VISITS), LABS, REFILLS, MESSAGING, ETC [email or web address] [phone]    E-VISIT: click \"on-line care, then request e-visit\".  E-visits work well for following up on issues we have discussed in clinic previously which may need new prescriptions, new prescriptions or substantial discussion. These are always done by me (Dr. Wegener).     ONCARE VISIT:  [email or web address]  - we treat nearly 50 common conditions through on-care.  These are done in an hour by on-call staff.     RADIOLOGY:  [facility]:  [phone]   [facility]: [phone]    Mammogram and Colonoscopy [phone]    Smoking Cessation: [email or web address], [phone] ([phone])      CONSUMER PRICE LINE for estimates of test costs:  [phone] "

## 2018-12-21 NOTE — PROGRESS NOTES
SUBJECTIVE:   Erwin Aguilar is a 58 year old male who presents to clinic today for the following health issues:      Medication Followup of   all    Taking Medication as prescribed: yes    Side Effects:  Tiredness     Medication Helping Symptoms:  yes            Acute sinusitis with symptoms > 10 days: with significant nasal crusting.  Uses clindamycin nose washes (mixes with saline) and bactroban.  Desires refills today.   Esophageal varices in cirrhosis (H) and   Hypertension goal BP (blood pressure) < 140/90: using propranolol 40mg twice daily.  Significant ace inhibitor and amlodipne allergy . Pulse in 40s at night, 50s rest during day but can get up to 90-100s per his apple watch.   Type 2 diabetes mellitus with diabetic polyneuropathy, with long-term current use of insulin (H): last a1c good.   Nausea :he has attributed to liver disease.  Using fair amount of compazione which does help.         Problem list, Medication list, Allergies, and Medical/Social/Surgical histories reviewed in [facility] and updated as appropriate.  Labs reviewed in EPIC  BP Readings from Last 3 Encounters:   12/21/18 161/82   11/23/18 158/82   10/09/18 140/90    Wt Readings from Last 3 Encounters:   12/21/18 102.5 kg (226 lb)   11/23/18 104.3 kg (230 lb)   10/09/18 102.5 kg (226 lb)                  Patient Active Problem List   Diagnosis     Generalized anxiety disorder     Type 2 diabetes, HbA1c goal < 7% (H)     Hyperlipidemia LDL goal <100     Hypertension goal BP (blood pressure) < 140/90     Major depressive disorder, recurrent episode, mild (H)     Chronic pain syndrome     GERD (gastroesophageal reflux disease)     Abnormal liver function tests     Health Care Home     Abnormal EMG     Hernia     Prolonged QT interval     Diastasis recti     Hypokalemia     Cauda equina syndrome with neurogenic bladder (H)     Wound infection     Cellulitis     Nausea without vomiting     Atopic dermatitis     Muscle spasms of Upper extremity      Edema of left lower extremity     Esophageal varices in cirrhosis (H)     Cirrhosis of liver (H)     Anemia due to blood loss, acute     Skin infection     Superficial thrombophlebitis of arm     Cellulitis of hand     Ganglion cyst     Dry skin dermatitis     Moderate persistent asthma     Open wound of right heel     Fall     Closed fracture of right patella     Right knee pain     Alcoholic cirrhosis of liver without ascites (H)     Lumbosacral radiculopathy     Calculus of gallbladder without cholecystitis without obstruction     Type 2 diabetes mellitus without complication, with long-term current use of insulin (H)     Wound, open, buttock, right     Type 2 diabetes mellitus with diabetic polyneuropathy, with long-term current use of insulin (H)     Benign neoplasm of skin of trunk, except scrotum     Hematemesis with nausea     Osteoarthritis of lumbar spine, unspecified spinal osteoarthritis complication status     S/P insertion of spinal cord stimulator     Past Surgical History:   Procedure Laterality Date     BACK SURGERY  August 2009     C APPENDECTOMY  1974     COLONOSCOPY N/A 5/12/2016    Procedure: COMBINED COLONOSCOPY, SINGLE OR MULTIPLE BIOPSY/POLYPECTOMY BY BIOPSY;  Surgeon: Ana Paula Urbina MD;  Location:  GI     ENDOSCOPY UPPER, COLONOSCOPY, COMBINED  10/19/2011    Procedure:COMBINED ENDOSCOPY UPPER, COLONOSCOPY; Upper Endoscopy, Colonoscopy with Polypectomy x4; Surgeon:AMBAR RODRÍGUEZ; Location: OR     ENT SURGERY  1-2016    Ongoing since then     ESOPHAGOSCOPY, GASTROSCOPY, DUODENOSCOPY (EGD), COMBINED  3/28/2014    Procedure: COMBINED ESOPHAGOSCOPY, GASTROSCOPY, DUODENOSCOPY (EGD);  EGD, Gastric Biopsies, Esophageal Banding;  Surgeon: Reyna Tovar MD;  Location:  OR     ESOPHAGOSCOPY, GASTROSCOPY, DUODENOSCOPY (EGD), COMBINED  6/9/2014    Procedure: COMBINED ESOPHAGOSCOPY, GASTROSCOPY, DUODENOSCOPY (EGD);  Surgeon: Curtis Mendez MD;  Location:  GI      ESOPHAGOSCOPY, GASTROSCOPY, DUODENOSCOPY (EGD), COMBINED  7/24/2014    Procedure: COMBINED ESOPHAGOSCOPY, GASTROSCOPY, DUODENOSCOPY (EGD);  Surgeon: Gerard Samaniego MD;  Location: [facility] OR     ESOPHAGOSCOPY, GASTROSCOPY, DUODENOSCOPY (EGD), COMBINED N/A 10/31/2014    Procedure: COMBINED ESOPHAGOSCOPY, GASTROSCOPY, DUODENOSCOPY (EGD);  Surgeon: Gerard Samaniego MD;  Location: [facility] OR     ESOPHAGOSCOPY, GASTROSCOPY, DUODENOSCOPY (EGD), COMBINED N/A 5/12/2016    Procedure: COMBINED ESOPHAGOSCOPY, GASTROSCOPY, DUODENOSCOPY (EGD);  Surgeon: Ana Paula Urbina MD;  Location: [facility] GI     ESOPHAGOSCOPY, GASTROSCOPY, DUODENOSCOPY (EGD), COMBINED N/A 8/2/2018    Procedure: COMBINED ESOPHAGOSCOPY, GASTROSCOPY, DUODENOSCOPY (EGD);  EGD;  Surgeon: Yu Wagner MD;  Location: [facility] GI     HCL SQUAMOUS CELL CARCINOMA AG  10/07    left forearm     HERNIORRHAPHY UMBILICAL  11/8/2012    Procedure: HERNIORRHAPHY UMBILICAL;  Open Umbilical Hernia Repair With Mesh ;  Surgeon: Chase Nicholson MD;  Location: [facility] OR     INSERT STIMULATOR DORSAL COLUMN N/A 6/28/2018    Procedure: INSERT STIMULATOR DORSAL COLUMN;;  Surgeon: Elvis Sauceda MD;  Location: [facility] OR     neuro stimulator  2010     REMOVE GENERATOR STIMULATOR (LOCATION) N/A 6/28/2018    Procedure: REMOVE GENERATOR STIMULATOR (LOCATION);  Spinal Cord Stimulator and IPG Explant and Re-Implant of SCS System (Leads and IPG);  Surgeon: Elvis Sauceda MD;  Location: [facility] OR     SURGICAL HISTORY OF -   1/02    abcess tooth     SURGICAL HISTORY OF -   1999    L4-5 laminectomy, cauda equina syndrome     SURGICAL HISTORY OF -   +    herniated disk repair     TONSILLECTOMY  10 1964     TRANSPOSITION ULNAR NERVE (ELBOW)      right       Social History     Tobacco Use     Smoking status: Former Smoker     Packs/day: 0.50     Years: 1.00     Pack years: 0.50     Types: Cigarettes     Start date: 10/13/1983     Last attempt to  quit: 1984     Years since quittin.5     Smokeless tobacco: Never Used   Substance Use Topics     Alcohol use: No     Alcohol/week: 0.0 oz     Comment: a pint of alcohol / day - last drink was 3/28/14     Family History   Problem Relation Age of Onset     Cancer Mother         lung     Breast Cancer Mother      Other Cancer Mother      Cancer Father         esophogeal, GERD     Diabetes Brother         amputation, Type 1     Diabetes Brother      Diabetes Brother      Cancer - colorectal No family hx of          Current Outpatient Medications   Medication Sig Dispense Refill     ACE/ARB NOT PRESCRIBED, INTENTIONAL, ACE & ARB not prescribed due to Allergy       albuterol (PROAIR HFA/PROVENTIL HFA/VENTOLIN HFA) 108 (90 Base) MCG/ACT Inhaler Inhale 2 puffs into the lungs every 6 hours as needed for shortness of breath / dyspnea or wheezing Ventolin or other covered brand 3 Inhaler 3     Alcohol Swabs (ALCOHOL PADS) 70 % PADS 1 Box 4 times daily 100 each 3     ammonium lactate (LAC-HYDRIN) 12 % cream Apply topically 2 times daily as needed for dry skin 385 g 3     baclofen (LIORESAL) 10 MG tablet TAKE 2 TABLETS BY MOUTH THREE TIMES DAILY 180 tablet 11     BASAGLAR 100 UNIT/ML injection Inject 26 Units Subcutaneous daily 30 mL 11     BD ROSE U/F 32G X 4 MM insulin pen needle USE ONCE DAILY AS DIRECTED 100 each 11     blood glucose monitoring (NO BRAND SPECIFIED) test strip Use to test blood sugars 1 time daily. ACCU-CHEK KARISHMA BRAND PLEASE. 100 strip 3     blood glucose monitoring (NO BRAND SPECIFIED) test strip Use to test blood sugars one time daily or as directed    Accucheck Richards plus test strips 1 Box 5     blood glucose monitoring (SOFTCLIX) lancets Use to test blood sugar 1 time daily or as directed. 100 each 3     Blood Glucose Monitoring Suppl (ACURA BLOOD GLUCOSE METER) W/DEVICE KIT 1 Dose 2 times daily 1 kit 1     clindamycin (CLEOCIN) 150 MG capsule Take 1 capsule (150 mg) by mouth 3 times daily for  "7 days 21 capsule 0     hydrochlorothiazide (HYDRODIURIL) 25 MG tablet TAKE 1 TABLET (25 MG) BY MOUTH DAILY (Patient taking differently: AM) 90 tablet 3     mupirocin (BACTROBAN) 2 % external ointment Apply topically 3 times daily for 7 days 30 g 1     mupirocin (BACTROBAN) 2 % ointment Apply to anterior nares BID X 2 month supply (Patient taking differently: Apply topically as needed Apply to anterior nares BID X 2 month supply) 2 g 3     omeprazole (PRILOSEC) 40 MG capsule Take 1 capsule (40 mg) by mouth daily Take 30-60 minutes before a meal. 90 capsule 3     order for DME Equipment being ordered: wound care supplies  CONVATEC DUODERM  Extra Thin Dressing - 1 1/4\" x 1 1/2\", Spots, oval  [id number]  Wound measurements  3 cm x 3 cm x 0.1 cm lesions with wound area of 1.5 x 1.5 that has slight drainage, full thickness located on the right sacral region 1 Box 3     order for DME   Location:sacral ulcer unknown etiology possible sheer    measurement: 2.5 cm x 2 cm x 0.1 cm Unlikely to be a  pressure ulcer but if it is it's a stg 2,  partial thickness, low eexudate    Wound supplies :  5 each Comfeel  Plus Transparent 3530 2 x 2 3/4''  5x7 cm  [facility]     wound care orders: cleans wound, dry thoroughly  and replace Comfeel. Leave in place for 7 days for 5 weeks 5 each 3     order for DME Equipment being ordered: one donut for sitting 1 Device 0     ORDER FOR DME Equipment being ordered: BP cuff 1 Device 0     promethazine (PHENERGAN) 25 MG tablet TAKE 1 TABLET (25 MG) BY MOUTH EVERY 6 HOURS AS NEEDED FOR NAUSEA 120 tablet 11     promethazine (PHENERGAN) 25 MG tablet TAKE 1 TABLET (25 MG) BY MOUTH EVERY 6 HOURS AS NEEDED FOR NAUSEA 40 tablet 0     propranolol HCl 60 MG TABS Take 1 tablet (60 mg) by mouth 2 times daily (Patient taking differently: Take 40 mg by mouth 2 times daily ) 180 tablet 3     rosuvastatin (CRESTOR) 10 MG tablet Take 1 tablet (10 mg) by mouth daily 90 tablet 3     sertraline (ZOLOFT) 100 MG " tablet TAKE 2 TABLETS BY MOUTH EVERY DAY 60 tablet 0     sertraline (ZOLOFT) 100 MG tablet Take 2 tablets (200 mg) by mouth daily (Patient taking differently: Take 200 mg by mouth every morning ) 180 tablet 1     spironolactone (ALDACTONE) 50 MG tablet Take 1 tablet (50 mg) by mouth daily 90 tablet 3     STATIN NOT PRESCRIBED, INTENTIONAL, Please choose reason not prescribed, below       Syringe, Disposable, 25 ML MISC Syringe to be used for nasal irrigation 1 each 3     oxyCODONE IR (ROXICODONE) 10 MG tablet Take 1 tablet (10 mg) by mouth every 6 hours as needed for moderate to severe pain (Patient not taking: Reported on 12/21/2018) 40 tablet 0     Allergies   Allergen Reactions     Lisinopril Anaphylaxis     Swollen tongue; inability to swallow; drooling; hives; swollen face, neck, angioedema     Acetaminophen      Hx of cirrhosis      Amlodipine      Swelling, hives, possible angioedema       Morphine      b/p dropped and arms went numb  Hypotension     Quinolones Hives     Bactrim [Sulfamethoxazole W/Trimethoprim] Rash     Levaquin Swelling and Rash     Swelling in lip and tongue felt thick     Recent Labs   Lab Test 10/09/18  0923 07/11/18  0707 07/02/18  1040 07/01/18  0541  06/18/18  0911  12/20/17  1049  05/15/17  0905  12/11/15  0854  10/05/15  0923   A1C 6.1*  --   --   --   --  6.2*  --  6.4*  --  5.8   < >  --   --   --    *  --   --   --   --   --   --   --   --  173*  --  123*  --   --    HDL 44  --   --   --   --   --   --   --   --  43  --  42  --   --    TRIG 221*  --   --   --   --   --   --   --   --  109  --  74  --   --    ALT  --  19 21 20   < > 20   < >  --    < >  --    < > 28   < > 24   CR  --  0.65* 0.58* 0.59*   < > 0.65*   < > 0.68   < >  --    < >  --    < > 0.78   GFRESTIMATED  --  >90 >90 >90   < > >90   < > >90   < >  --    < >  --    < > >90  Non  GFR Calc     GFRESTBLACK  --  >90 >90 >90   < > >90   < > >90   < >  --    < >  --    < > >90    "GFR Calc     POTASSIUM  --  3.6 3.2* 3.5   < > 3.2*   < > 3.1*   < >  --    < > 3.9   < > 3.3*   TSH 1.36  --   --   --   --   --   --   --   --   --   --   --   --  1.08    < > = values in this interval not displayed.        ROS:  Constitutional, HEENT, cardiovascular, pulmonary, GI, , musculoskeletal, neuro, skin, endocrine and psych systems are negative, except as otherwise noted.        OBJECTIVE:  /82 (BP Location: Left arm, Patient Position: Sitting, Cuff Size: Adult Regular)   Pulse 54   Temp 97.9  F (36.6  C) (Oral)   Resp 18   Ht 1.803 m (5' 11\")   Wt 102.5 kg (226 lb)   SpO2 98%   BMI 31.52 kg/m      EXAM:  GENERAL APPEARANCE: healthy, alert and no distress  Clear speech.     ASSESSMENT AND PLAN  Patient Instructions   ASSESSMENT AND PLAN  1. Acute sinusitis with symptoms > 10 days  Treat with clindamycin and mupirocin for nose.     - clindamycin (CLEOCIN) 150 MG capsule; Take 1 capsule (150 mg) by mouth 3 times daily for 7 days  Dispense: 21 capsule; Refill: 0  - mupirocin (BACTROBAN) 2 % external ointment; Apply topically 3 times daily for 7 days  Dispense: 30 g; Refill: 1    2. Esophageal varices in cirrhosis (H)    - spironolactone (ALDACTONE) 50 MG tablet; Take 1 tablet (50 mg) by mouth daily  Dispense: 90 tablet; Refill: 3    3. Hypertension goal BP (blood pressure) < 140/90  Add spironolactone, continue propranolol 40mg twice daily for now.   - spironolactone (ALDACTONE) 50 MG tablet; Take 1 tablet (50 mg) by mouth daily  Dispense: 90 tablet; Refill: 3    4. Type 2 diabetes mellitus with diabetic polyneuropathy, with long-term current use of insulin (H)  Follow up end of January.  I placed a1c order to do with GI specialist labs if desired Jan 14.     5. Nausea  Please be sure to discuss further with Dr. Horn.  Consider gastroparesis.  Discuss if ct scan needed (has had recent ultrasond and upper endoscopy) although I do not feel needed.     6.  Chronic pain/failed back " "stimulator/chronic sciatica:  Continues on baclofen.  Will discuss gabapentin further next visit.         MYCHART FOR ON-LINE CARE(VISITS), LABS, REFILLS, MESSAGING, ETC [email or web address] [phone]    E-VISIT: click \"on-line care, then request e-visit\".  E-visits work well for following up on issues we have discussed in clinic previously which may need new prescriptions, new prescriptions or substantial discussion. These are always done by me (Dr. Wegener).     ONCARE VISIT:  [email or web address]  - we treat nearly 50 common conditions through on-care.  These are done in an hour by on-call staff.     RADIOLOGY:  [facility]:  [phone]   [facility]: [phone]    Mammogram and Colonoscopy [phone]    Smoking Cessation: [email or web address], [phone] ([phone])      CONSUMER PRICE LINE for estimates of test costs:  [phone]             Joel Wegener, MD        "

## 2018-12-22 ASSESSMENT — ASTHMA QUESTIONNAIRES: ACT_TOTALSCORE: 20

## 2019-01-03 DIAGNOSIS — F41.1 GENERALIZED ANXIETY DISORDER: ICD-10-CM

## 2019-01-03 DIAGNOSIS — R11.0 NAUSEA WITHOUT VOMITING: ICD-10-CM

## 2019-01-03 NOTE — TELEPHONE ENCOUNTER
"Requested Prescriptions   Pending Prescriptions Disp Refills     promethazine (PHENERGAN) 25 MG tablet [Pharmacy Med Name: PROMETHAZINE 25 MG TABLET]  Last Written Prescription Date:  12/11/2018  Last Fill Quantity: 120 tablet,  # refills: 11   Last Office Visit: 12/21/2018   Future Office Visit:      40 tablet 0     Sig: TAKE 1 TABLET (25 MG) BY MOUTH EVERY 6 HOURS AS NEEDED FOR NAUSEA     Antivertigo/Antiemetic Agents Passed - 1/3/2019  2:33 PM       Passed - Recent (12 mo) or future (30 days) visit within the authorizing provider's specialty    Patient had office visit in the last 12 months or has a visit in the next 30 days with authorizing provider or within the authorizing provider's specialty.  See \"Patient Info\" tab in inbasket, or \"Choose Columns\" in Meds & Orders section of the refill encounter.           Passed - Patient is 18 years of age or older          "

## 2019-01-04 RX ORDER — SERTRALINE HYDROCHLORIDE 100 MG/1
TABLET, FILM COATED ORAL
Qty: 180 TABLET | Refills: 1 | Status: SHIPPED | OUTPATIENT
Start: 2019-01-04 | End: 2019-06-14

## 2019-01-04 RX ORDER — PROMETHAZINE HYDROCHLORIDE 25 MG/1
TABLET ORAL
Qty: 40 TABLET | Refills: 0 | OUTPATIENT
Start: 2019-01-04

## 2019-01-04 NOTE — TELEPHONE ENCOUNTER
"Requested Prescriptions   Pending Prescriptions Disp Refills     sertraline (ZOLOFT) 100 MG tablet [Pharmacy Med Name: SERTRALINE  MG TABLET]  Last Written Prescription Date:  12/7/2018  Last Fill Quantity: 60 tabs,  # refills: 0   Last office visit: 12/21/2018 with prescribing provider:  Bhanu   Future Office Visit:     60 tablet 0     Sig: TAKE 2 TABLETS BY MOUTH EVERY DAY    SSRIs Protocol Passed - 1/3/2019  9:08 PM       Passed - Recent (12 mo) or future (30 days) visit within the authorizing provider's specialty    Patient had office visit in the last 12 months or has a visit in the next 30 days with authorizing provider or within the authorizing provider's specialty.  See \"Patient Info\" tab in inbasket, or \"Choose Columns\" in Meds & Orders section of the refill encounter.             Passed - Patient is age 18 or older          "

## 2019-01-05 NOTE — TELEPHONE ENCOUNTER
Prescription approved per [facility] [place].      SALONI Oliva, BSN, RN

## 2019-01-14 ENCOUNTER — OFFICE VISIT (OUTPATIENT)
Dept: GASTROENTEROLOGY | Facility: CLINIC | Age: 59
End: 2019-01-14
Attending: INTERNAL MEDICINE
Payer: MEDICARE

## 2019-01-14 VITALS
SYSTOLIC BLOOD PRESSURE: 157 MMHG | OXYGEN SATURATION: 99 % | HEART RATE: 59 BPM | WEIGHT: 219.6 LBS | BODY MASS INDEX: 30.63 KG/M2 | DIASTOLIC BLOOD PRESSURE: 82 MMHG | TEMPERATURE: 97.4 F

## 2019-01-14 DIAGNOSIS — K70.30 ALCOHOLIC CIRRHOSIS OF LIVER WITHOUT ASCITES (H): Primary | ICD-10-CM

## 2019-01-14 DIAGNOSIS — T85.192A: ICD-10-CM

## 2019-01-14 DIAGNOSIS — Z79.4 TYPE 2 DIABETES MELLITUS WITH DIABETIC POLYNEUROPATHY, WITH LONG-TERM CURRENT USE OF INSULIN (H): ICD-10-CM

## 2019-01-14 DIAGNOSIS — E11.42 TYPE 2 DIABETES MELLITUS WITH DIABETIC POLYNEUROPATHY, WITH LONG-TERM CURRENT USE OF INSULIN (H): ICD-10-CM

## 2019-01-14 DIAGNOSIS — I10 HYPERTENSION GOAL BP (BLOOD PRESSURE) < 140/90: ICD-10-CM

## 2019-01-14 DIAGNOSIS — E78.5 HYPERLIPIDEMIA LDL GOAL <100: ICD-10-CM

## 2019-01-14 DIAGNOSIS — K70.30 ALCOHOLIC CIRRHOSIS OF LIVER WITHOUT ASCITES (H): ICD-10-CM

## 2019-01-14 LAB
ALBUMIN SERPL-MCNC: 4.1 G/DL (ref 3.4–5)
ALP SERPL-CCNC: 115 U/L (ref 40–150)
ALT SERPL W P-5'-P-CCNC: 40 U/L (ref 0–70)
ANION GAP SERPL CALCULATED.3IONS-SCNC: 8 MMOL/L (ref 3–14)
AST SERPL W P-5'-P-CCNC: 29 U/L (ref 0–45)
BASOPHILS # BLD AUTO: 0.1 10E9/L (ref 0–0.2)
BASOPHILS NFR BLD AUTO: 1.3 %
BILIRUB DIRECT SERPL-MCNC: 0.2 MG/DL (ref 0–0.2)
BILIRUB SERPL-MCNC: 0.6 MG/DL (ref 0.2–1.3)
BUN SERPL-MCNC: 9 MG/DL (ref 7–30)
CALCIUM SERPL-MCNC: 9.1 MG/DL (ref 8.5–10.1)
CHLORIDE SERPL-SCNC: 104 MMOL/L (ref 94–109)
CHOLEST SERPL-MCNC: 180 MG/DL
CO2 SERPL-SCNC: 24 MMOL/L (ref 20–32)
CREAT SERPL-MCNC: 0.66 MG/DL (ref 0.66–1.25)
DIFFERENTIAL METHOD BLD: NORMAL
EOSINOPHIL # BLD AUTO: 0.4 10E9/L (ref 0–0.7)
EOSINOPHIL NFR BLD AUTO: 5.2 %
ERYTHROCYTE [DISTWIDTH] IN BLOOD BY AUTOMATED COUNT: 14.4 % (ref 10–15)
GFR SERPL CREATININE-BSD FRML MDRD: >90 ML/MIN/{1.73_M2}
GLUCOSE SERPL-MCNC: 152 MG/DL (ref 70–99)
HBA1C MFR BLD: 7.2 % (ref 0–5.6)
HCT VFR BLD AUTO: 44.2 % (ref 40–53)
HDLC SERPL-MCNC: 38 MG/DL
HGB BLD-MCNC: 14.6 G/DL (ref 13.3–17.7)
IMM GRANULOCYTES # BLD: 0 10E9/L (ref 0–0.4)
IMM GRANULOCYTES NFR BLD: 0.4 %
INR PPP: 1.11 (ref 0.86–1.14)
LDLC SERPL CALC-MCNC: 120 MG/DL
LYMPHOCYTES # BLD AUTO: 1.6 10E9/L (ref 0.8–5.3)
LYMPHOCYTES NFR BLD AUTO: 18.4 %
MCH RBC QN AUTO: 27.7 PG (ref 26.5–33)
MCHC RBC AUTO-ENTMCNC: 33 G/DL (ref 31.5–36.5)
MCV RBC AUTO: 84 FL (ref 78–100)
MONOCYTES # BLD AUTO: 0.6 10E9/L (ref 0–1.3)
MONOCYTES NFR BLD AUTO: 7.2 %
NEUTROPHILS # BLD AUTO: 5.7 10E9/L (ref 1.6–8.3)
NEUTROPHILS NFR BLD AUTO: 67.5 %
NONHDLC SERPL-MCNC: 142 MG/DL
NRBC # BLD AUTO: 0 10*3/UL
NRBC BLD AUTO-RTO: 0 /100
PLATELET # BLD AUTO: 202 10E9/L (ref 150–450)
POTASSIUM SERPL-SCNC: 3.4 MMOL/L (ref 3.4–5.3)
PROT SERPL-MCNC: 8.6 G/DL (ref 6.8–8.8)
RBC # BLD AUTO: 5.28 10E12/L (ref 4.4–5.9)
SODIUM SERPL-SCNC: 137 MMOL/L (ref 133–144)
TRIGL SERPL-MCNC: 108 MG/DL
WBC # BLD AUTO: 8.4 10E9/L (ref 4–11)

## 2019-01-14 PROCEDURE — 85610 PROTHROMBIN TIME: CPT

## 2019-01-14 PROCEDURE — G0463 HOSPITAL OUTPT CLINIC VISIT: HCPCS | Mod: ZF

## 2019-01-14 ASSESSMENT — PAIN SCALES - GENERAL: PAINLEVEL: NO PAIN (0)

## 2019-01-14 NOTE — LETTER
1/14/2019      RE: Erwin Aguilar  [address]  [place] [address]       [facility]    Hepatology follow-up    CHIEF COMPLAINT/REASON FOR THE VISIT:  Cirrhosis of the liver.      SUBJECTIVE:  Mr. Aguilar is a 58-year-old white male whom we have followed here for alcoholic cirrhosis.  He did present initially when he was diagnosed with the hematemesis to the emergency room.  EGD showed that he had esophageal varices and these were banded.  Mr. Aguilar then abstained from alcoholic beverages, and he improved and in fact his MELD sodium runs between 6-7.  Please note that Mr. Aguilar has other comorbidities and this includes cauda equina syndrome and had back surgery and has a spinal cord stimulator; he had 1 taken out and then another one put in and this was 06/28/2018.  He did have some antibiotics at that time and he is doing quite well now.  He otherwise did very well, no edema, weight is stable and appetite is low according to him.  He has no abdominal pain, some nausea but no vomiting.  He is moving his bowels every day with no blood in it and he has not started any new medications.  He attributes this to not eating enough because of bad teeth and in fact, he has an appointment with the dentist.     Medical hx Surgical hx   Past Medical History:   Diagnosis Date     Actinic keratosis      Anxiety      Cancer (H)      Cauda equina spinal cord injury (H)      Chronic sinusitis 5-1-16     Diastasis recti      Esophageal reflux      Esophageal varices in cirrhosis (H) 4/1/2014     Essential hypertension, benign      Intermittent asthma      Mild depression (H)      Mixed hyperlipidemia      Nasal polyps 5-1-16     Other chronic pain      SCCA (squamous cell carcinoma) of skin      Seasonal allergic conjunctivitis      Type II or unspecified type diabetes mellitus without mention of complication, not stated as uncontrolled      Unspecified site of spinal cord injury  without evidence of spinal bone injury       Past Surgical History:   Procedure Laterality Date     BACK SURGERY  August 2009     C APPENDECTOMY  1974     COLONOSCOPY N/A 5/12/2016    Procedure: COMBINED COLONOSCOPY, SINGLE OR MULTIPLE BIOPSY/POLYPECTOMY BY BIOPSY;  Surgeon: Ana Paula Urbina MD;  Location:  GI     ENDOSCOPY UPPER, COLONOSCOPY, COMBINED  10/19/2011    Procedure:COMBINED ENDOSCOPY UPPER, COLONOSCOPY; Upper Endoscopy, Colonoscopy with Polypectomy x4; Surgeon:AMBAR RODRÍGUEZ; Location: OR     ENT SURGERY  1-2016    Ongoing since then     ESOPHAGOSCOPY, GASTROSCOPY, DUODENOSCOPY (EGD), COMBINED  3/28/2014    Procedure: COMBINED ESOPHAGOSCOPY, GASTROSCOPY, DUODENOSCOPY (EGD);  EGD, Gastric Biopsies, Esophageal Banding;  Surgeon: Reyna Tovar MD;  Location:  OR     ESOPHAGOSCOPY, GASTROSCOPY, DUODENOSCOPY (EGD), COMBINED  6/9/2014    Procedure: COMBINED ESOPHAGOSCOPY, GASTROSCOPY, DUODENOSCOPY (EGD);  Surgeon: Curtis Mendez MD;  Location:  GI     ESOPHAGOSCOPY, GASTROSCOPY, DUODENOSCOPY (EGD), COMBINED  7/24/2014    Procedure: COMBINED ESOPHAGOSCOPY, GASTROSCOPY, DUODENOSCOPY (EGD);  Surgeon: Gerard Samaniego MD;  Location:  OR     ESOPHAGOSCOPY, GASTROSCOPY, DUODENOSCOPY (EGD), COMBINED N/A 10/31/2014    Procedure: COMBINED ESOPHAGOSCOPY, GASTROSCOPY, DUODENOSCOPY (EGD);  Surgeon: Gerard Samaniego MD;  Location:  OR     ESOPHAGOSCOPY, GASTROSCOPY, DUODENOSCOPY (EGD), COMBINED N/A 5/12/2016    Procedure: COMBINED ESOPHAGOSCOPY, GASTROSCOPY, DUODENOSCOPY (EGD);  Surgeon: Ana Paula Urbina MD;  Location:  GI     ESOPHAGOSCOPY, GASTROSCOPY, DUODENOSCOPY (EGD), COMBINED N/A 8/2/2018    Procedure: COMBINED ESOPHAGOSCOPY, GASTROSCOPY, DUODENOSCOPY (EGD);  EGD;  Surgeon: Yu Wagner MD;  Location:  GI     HCL SQUAMOUS CELL CARCINOMA AG  10/07    left forearm     HERNIORRHAPHY UMBILICAL  11/8/2012    Procedure: HERNIORRHAPHY UMBILICAL;   Open Umbilical Hernia Repair With Mesh ;  Surgeon: Chase Nicholson MD;  [facility]     INSERT STIMULATOR DORSAL COLUMN N/A 6/28/2018    Procedure: INSERT STIMULATOR DORSAL COLUMN;;  Surgeon: Elvis Sauceda MD;  [facility]     neuro stimulator  2010     REMOVE GENERATOR STIMULATOR (LOCATION) N/A 6/28/2018    Procedure: REMOVE GENERATOR STIMULATOR (LOCATION);  Spinal Cord Stimulator and IPG Explant and Re-Implant of SCS System (Leads and IPG);  Surgeon: Elvis Sauceda MD;  Location:  OR     SURGICAL HISTORY OF -   1/02    abcess tooth     SURGICAL HISTORY OF -   1999    L4-5 laminectomy, cauda equina syndrome     SURGICAL HISTORY OF -   +    herniated disk repair     TONSILLECTOMY  10 1964     TRANSPOSITION ULNAR NERVE (ELBOW)      right          Medications  Prior to Admission medications    Medication Sig Start Date End Date Taking? Authorizing Provider   ACE/ARB NOT PRESCRIBED, INTENTIONAL, ACE & ARB not prescribed due to Allergy 8/2/12  Yes Ksenia Bean APRN CNP   albuterol (PROAIR HFA/PROVENTIL HFA/VENTOLIN HFA) 108 (90 Base) MCG/ACT Inhaler Inhale 2 puffs into the lungs every 6 hours as needed for shortness of breath / dyspnea or wheezing Ventolin or other covered brand 7/24/18  Yes Wegener, Joel Daniel Irwin, MD   Alcohol Swabs (ALCOHOL PADS) 70 % PADS 1 Box 4 times daily 3/1/16  Yes Wegener, Joel Daniel Irwin, MD   ammonium lactate (LAC-HYDRIN) 12 % cream Apply topically 2 times daily as needed for dry skin 11/1/17  Yes Victor M Anaya DPM   baclofen (LIORESAL) 10 MG tablet TAKE 2 TABLETS BY MOUTH THREE TIMES DAILY 6/17/18  Yes Wegener, Joel Daniel Irwin, MD   BASAGLAR 100 UNIT/ML injection Inject 26 Units Subcutaneous daily 12/13/17  Yes Wegener, Joel Daniel Irwin, MD   BD ROSE U/F 32G X 4 MM insulin pen needle USE ONCE DAILY AS DIRECTED 1/11/18  Yes Wegener, Joel Daniel Irwin, MD   blood glucose monitoring (NO BRAND SPECIFIED) test  "strip Use to test blood sugars 1 time daily. ACCU-CHEK KARISHMA BRAND PLEASE. 2/12/18  Yes Wegener, Joel Daniel Irwin, MD   blood glucose monitoring (NO BRAND SPECIFIED) test strip Use to test blood sugars one time daily or as directed    Accucheck Richards plus test strips 10/6/16  Yes Wegener, Joel Daniel Irwin, MD   blood glucose monitoring (SOFTCLIX) lancets Use to test blood sugar 1 time daily or as directed. 2/22/18  Yes Wegener, Joel Daniel Irwin, MD   Blood Glucose Monitoring Suppl (ACURA BLOOD GLUCOSE METER) W/DEVICE KIT 1 Dose 2 times daily 4/14/14  Yes Estrella Bowman NP   hydrochlorothiazide (HYDRODIURIL) 25 MG tablet TAKE 1 TABLET (25 MG) BY MOUTH DAILY  Patient taking differently: AM 3/23/18  Yes Ksenia Drummond MD   mupirocin (BACTROBAN) 2 % ointment Apply to anterior nares BID X 2 month supply  Patient taking differently: Apply topically as needed Apply to anterior nares BID X 2 month supply 2/16/17  Yes Antonio Chávez MD   omeprazole (PRILOSEC) 40 MG capsule Take 1 capsule (40 mg) by mouth daily Take 30-60 minutes before a meal. 5/19/16  Yes Wegener, Joel Daniel Irwin, MD   order for DME Equipment being ordered: wound care supplies  CONVATEC DUODERM  Extra Thin Dressing - 1 1/4\" x 1 1/2\", Spots, oval  [id number]  Wound measurements  3 cm x 3 cm x 0.1 cm lesions with wound area of 1.5 x 1.5 that has slight drainage, full thickness located on the right sacral region 1/12/18  Yes Delilah Molina MD   order for DME   Location:sacral ulcer unknown etiology possible sheer    measurement: 2.5 cm x 2 cm x 0.1 cm Unlikely to be a  pressure ulcer but if it is it's a stg 2,  partial thickness, low eexudate    Wound supplies :  5 each Comfeel  Plus Transparent 3530 2 x 2 3/4''  5x7 cm  [facility]     wound care orders: cleans wound, dry thoroughly  and replace Comfeel. Leave in place for 7 days for 5 weeks 8/17/17  Yes Delilah Molina MD   order for DME Equipment being ordered: one " donut for sitting 12/19/16  Yes Wegener, Joel Daniel Irwin, MD   ORDER FOR DME Equipment being ordered: BP cuff 1/13/14  Yes Estrella Bowman NP   oxyCODONE IR (ROXICODONE) 10 MG tablet Take 1 tablet (10 mg) by mouth every 6 hours as needed for moderate to severe pain 6/28/18  Yes Elvis Sauceda MD   promethazine (PHENERGAN) 25 MG tablet TAKE 1 TABLET (25 MG) BY MOUTH EVERY 6 HOURS AS NEEDED FOR NAUSEA 12/11/18  Yes Wegener, Joel Daniel Irwin, MD   promethazine (PHENERGAN) 25 MG tablet TAKE 1 TABLET (25 MG) BY MOUTH EVERY 6 HOURS AS NEEDED FOR NAUSEA 12/7/18  Yes Ksenia Drummond MD   propranolol HCl 60 MG TABS Take 1 tablet (60 mg) by mouth 2 times daily  Patient taking differently: Take 40 mg by mouth 2 times daily  10/9/18  Yes Wegener, Joel Daniel Irwin, MD   rosuvastatin (CRESTOR) 10 MG tablet Take 1 tablet (10 mg) by mouth daily 10/11/18  Yes Wegener, Joel Daniel Irwin, MD   sertraline (ZOLOFT) 100 MG tablet TAKE 2 TABLETS BY MOUTH EVERY DAY 1/4/19  Yes Ksenia Drummond MD   spironolactone (ALDACTONE) 50 MG tablet Take 1 tablet (50 mg) by mouth daily 12/21/18 12/21/19 Yes Wegener, Joel Daniel Irwin, MD   STATIN NOT PRESCRIBED, INTENTIONAL, Please choose reason not prescribed, below 9/17/18  Yes Wegener, Joel Daniel Irwin, MD   Syringe, Disposable, 25 ML MISC Syringe to be used for nasal irrigation 8/19/16  Yes Antonio Chávez MD       Allergies  Allergies   Allergen Reactions     Lisinopril Anaphylaxis     Swollen tongue; inability to swallow; drooling; hives; swollen face, neck, angioedema     Acetaminophen      Hx of cirrhosis      Amlodipine      Swelling, hives, possible angioedema       Morphine      b/p dropped and arms went numb  Hypotension     Quinolones Hives     Bactrim [Sulfamethoxazole W/Trimethoprim] Rash     Levaquin Swelling and Rash     Swelling in lip and tongue felt thick     Examination  /82   Pulse 59   Temp 97.4  F (36.3  C)   Wt 99.6  kg (219 lb 9.6 oz)   SpO2 99%   BMI 30.63 kg/m     Body mass index is 30.63 kg/m .    Gen- well, NAD, A+Ox3, normal color  Lym- no palpable LAD  CVS- RRR  RS- CTA  Abd- Not distended. No hepatosplenomegaly.  Extr- hands normal, no EVA  Skin- no rash or jaundice  Neuro- no asterixis  Psych- normal mood    Laboratory  Lab Results   Component Value Date     01/14/2019    POTASSIUM 3.4 01/14/2019    CHLORIDE 104 01/14/2019    CO2 24 01/14/2019    BUN 9 01/14/2019    CR 0.66 01/14/2019       Lab Results   Component Value Date    BILITOTAL 0.6 01/14/2019    ALT 40 01/14/2019    AST 29 01/14/2019    ALKPHOS 115 01/14/2019       Lab Results   Component Value Date    ALBUMIN 4.1 01/14/2019    PROTTOTAL 8.6 01/14/2019        Lab Results   Component Value Date    WBC 8.4 01/14/2019    HGB 14.6 01/14/2019    MCV 84 01/14/2019     01/14/2019       Lab Results   Component Value Date    INR 1.11 01/14/2019       Radiology    ASSESSMENT AND PLAN:  Mr. Aguilar is a 58-year-old male with alcoholic liver disease.  He initially presented with esophageal variceal bleed which were banded, but since then he has abstained from alcoholic beverages and did relatively well.  His MELD score is very low as 67, so he will continue doing surveillance for HCC and he has not done an ultrasound now as his last one was on 09/10/2018.  We ordered another one to do it at 6 months from that.  He also had some gallstones without any cholecystitis, but the last time he had an upper endoscopy was in 08/2018 at which time the esophagus appeared normal, although there was some hematin in the stomach.  Otherwise, nothing to intervene.  Our plan now is to see him every 6-12 months for surveillance for HCC and worsening.  He is very aware that he should not be going back on alcohol and he is very comfortable with that.  For his other medical issues, he will follow with his primary care physician.      This was a 25-minute visit of which more than  50% was spent in explaining to the patient what our plan of care was.  We answered all of his questions.      cc:  Primary care physician     Kimberly Ramirez MD  Hepatology  [facility]

## 2019-01-14 NOTE — PROGRESS NOTES
[facility]    Hepatology follow-up    CHIEF COMPLAINT/REASON FOR THE VISIT:  Cirrhosis of the liver.      SUBJECTIVE:  Mr. Aguilar is a 58-year-old white male whom we have followed here for alcoholic cirrhosis.  He did present initially when he was diagnosed with the hematemesis to the emergency room.  EGD showed that he had esophageal varices and these were banded.  Mr. Aguilar then abstained from alcoholic beverages, and he improved and in fact his MELD sodium runs between 6-7.  Please note that Mr. Aguilar has other comorbidities and this includes cauda equina syndrome and had back surgery and has a spinal cord stimulator; he had 1 taken out and then another one put in and this was 06/28/2018.  He did have some antibiotics at that time and he is doing quite well now.  He otherwise did very well, no edema, weight is stable and appetite is low according to him.  He has no abdominal pain, some nausea but no vomiting.  He is moving his bowels every day with no blood in it and he has not started any new medications.  He attributes this to not eating enough because of bad teeth and in fact, he has an appointment with the dentist.     Medical hx Surgical hx   Past Medical History:   Diagnosis Date     Actinic keratosis      Anxiety      Cancer (H)      Cauda equina spinal cord injury (H)      Chronic sinusitis 5-1-16     Diastasis recti      Esophageal reflux      Esophageal varices in cirrhosis (H) 4/1/2014     Essential hypertension, benign      Intermittent asthma      Mild depression (H)      Mixed hyperlipidemia      Nasal polyps 5-1-16     Other chronic pain      SCCA (squamous cell carcinoma) of skin      Seasonal allergic conjunctivitis      Type II or unspecified type diabetes mellitus without mention of complication, not stated as uncontrolled      Unspecified site of spinal cord injury without evidence of spinal bone injury       Past Surgical History:   Procedure Laterality  Date     BACK SURGERY  August 2009     C APPENDECTOMY  1974     COLONOSCOPY N/A 5/12/2016    Procedure: COMBINED COLONOSCOPY, SINGLE OR MULTIPLE BIOPSY/POLYPECTOMY BY BIOPSY;  Surgeon: Ana Paula Urbina MD;  Location:  GI     ENDOSCOPY UPPER, COLONOSCOPY, COMBINED  10/19/2011    Procedure:COMBINED ENDOSCOPY UPPER, COLONOSCOPY; Upper Endoscopy, Colonoscopy with Polypectomy x4; Surgeon:AMBAR RODRÍGUEZ; [facility]     ENT SURGERY  1-2016    Ongoing since then     ESOPHAGOSCOPY, GASTROSCOPY, DUODENOSCOPY (EGD), COMBINED  3/28/2014    Procedure: COMBINED ESOPHAGOSCOPY, GASTROSCOPY, DUODENOSCOPY (EGD);  EGD, Gastric Biopsies, Esophageal Banding;  Surgeon: Reyna Tovar MD;  [facility]     ESOPHAGOSCOPY, GASTROSCOPY, DUODENOSCOPY (EGD), COMBINED  6/9/2014    Procedure: COMBINED ESOPHAGOSCOPY, GASTROSCOPY, DUODENOSCOPY (EGD);  Surgeon: Curtis Mendez MD;  Location:  GI     ESOPHAGOSCOPY, GASTROSCOPY, DUODENOSCOPY (EGD), COMBINED  7/24/2014    Procedure: COMBINED ESOPHAGOSCOPY, GASTROSCOPY, DUODENOSCOPY (EGD);  Surgeon: Gerard Samaniego MD;  Location:  OR     ESOPHAGOSCOPY, GASTROSCOPY, DUODENOSCOPY (EGD), COMBINED N/A 10/31/2014    Procedure: COMBINED ESOPHAGOSCOPY, GASTROSCOPY, DUODENOSCOPY (EGD);  Surgeon: Gerard Samaniego MD;  [facility]     ESOPHAGOSCOPY, GASTROSCOPY, DUODENOSCOPY (EGD), COMBINED N/A 5/12/2016    Procedure: COMBINED ESOPHAGOSCOPY, GASTROSCOPY, DUODENOSCOPY (EGD);  Surgeon: Ana Paula Urbina MD;  [facility]     ESOPHAGOSCOPY, GASTROSCOPY, DUODENOSCOPY (EGD), COMBINED N/A 8/2/2018    Procedure: COMBINED ESOPHAGOSCOPY, GASTROSCOPY, DUODENOSCOPY (EGD);  EGD;  Surgeon: Yu Wagner MD;  Location:  GI     HCL SQUAMOUS CELL CARCINOMA AG  10/07    left forearm     HERNIORRHAPHY UMBILICAL  11/8/2012    Procedure: HERNIORRHAPHY UMBILICAL;  Open Umbilical Hernia Repair With Mesh ;  Surgeon: Chase Nicholson MD;  [facility]     INSERT STIMULATOR DORSAL COLUMN N/A 6/28/2018    Procedure: INSERT STIMULATOR DORSAL COLUMN;;  Surgeon: Elvis Sauceda MD;  [facility]     neuro stimulator  2010     REMOVE GENERATOR STIMULATOR (LOCATION) N/A 6/28/2018    Procedure: REMOVE GENERATOR STIMULATOR (LOCATION);  Spinal Cord Stimulator and IPG Explant and Re-Implant of SCS System (Leads and IPG);  Surgeon: Elvis Sauceda MD;  [facility]     SURGICAL HISTORY OF -   1/02    abcess tooth     SURGICAL HISTORY OF -   1999    L4-5 laminectomy, cauda equina syndrome     SURGICAL HISTORY OF -   +    herniated disk repair     TONSILLECTOMY  10 1964     TRANSPOSITION ULNAR NERVE (ELBOW)      right          Medications  Prior to Admission medications    Medication Sig Start Date End Date Taking? Authorizing Provider   ACE/ARB NOT PRESCRIBED, INTENTIONAL, ACE & ARB not prescribed due to Allergy 8/2/12  Yes Ksenia Bean APRN CNP   albuterol (PROAIR HFA/PROVENTIL HFA/VENTOLIN HFA) 108 (90 Base) MCG/ACT Inhaler Inhale 2 puffs into the lungs every 6 hours as needed for shortness of breath / dyspnea or wheezing Ventolin or other covered brand 7/24/18  Yes Wegener, Joel Daniel Irwin, MD   Alcohol Swabs (ALCOHOL PADS) 70 % PADS 1 Box 4 times daily 3/1/16  Yes Wegener, Joel Daniel Irwin, MD   ammonium lactate (LAC-HYDRIN) 12 % cream Apply topically 2 times daily as needed for dry skin 11/1/17  Yes Victor M Anaya DPM   baclofen (LIORESAL) 10 MG tablet TAKE 2 TABLETS BY MOUTH THREE TIMES DAILY 6/17/18  Yes Wegener, Joel Daniel Irwin, MD   BASAGLAR 100 UNIT/ML injection Inject 26 Units Subcutaneous daily 12/13/17  Yes Wegener, Joel Daniel Irwin, MD   BD ROSE U/F 32G X 4 MM insulin pen needle USE ONCE DAILY AS DIRECTED 1/11/18  Yes Wegener, Joel Daniel Irwin, MD   blood glucose monitoring (NO BRAND SPECIFIED) test strip Use to test blood sugars 1 time daily. ACCU-CHEK KARISHMA BRAND PLEASE. 2/12/18  Yes  "Wegener, Joel Daniel Irwin, MD   blood glucose monitoring (NO BRAND SPECIFIED) test strip Use to test blood sugars one time daily or as directed    Accucheck Richards plus test strips 10/6/16  Yes Wegener, Joel Daniel Irwin, MD   blood glucose monitoring (SOFTCLIX) lancets Use to test blood sugar 1 time daily or as directed. 2/22/18  Yes Wegener, Joel Daniel Irwin, MD   Blood Glucose Monitoring Suppl (ACURA BLOOD GLUCOSE METER) W/DEVICE KIT 1 Dose 2 times daily 4/14/14  Yes Estrella Bowman NP   hydrochlorothiazide (HYDRODIURIL) 25 MG tablet TAKE 1 TABLET (25 MG) BY MOUTH DAILY  Patient taking differently: AM 3/23/18  Yes Ksenia Drummond MD   mupirocin (BACTROBAN) 2 % ointment Apply to anterior nares BID X 2 month supply  Patient taking differently: Apply topically as needed Apply to anterior nares BID X 2 month supply 2/16/17  Yes Antonio Chávez MD   omeprazole (PRILOSEC) 40 MG capsule Take 1 capsule (40 mg) by mouth daily Take 30-60 minutes before a meal. 5/19/16  Yes Wegener, Joel Daniel Irwin, MD   order for DME Equipment being ordered: wound care supplies  CONVATEC DUODERM  Extra Thin Dressing - 1 1/4\" x 1 1/2\", Spots, oval  [id number]  Wound measurements  3 cm x 3 cm x 0.1 cm lesions with wound area of 1.5 x 1.5 that has slight drainage, full thickness located on the right sacral region 1/12/18  Yes Delilah Molina MD   order for DME   Location:sacral ulcer unknown etiology possible sheer    measurement: 2.5 cm x 2 cm x 0.1 cm Unlikely to be a  pressure ulcer but if it is it's a stg 2,  partial thickness, low eexudate    Wound supplies :  5 each Comfeel  Plus Transparent 3530 2 x 2 3/4''  5x7 cm  [facility]     wound care orders: cleans wound, dry thoroughly  and replace Comfeel. Leave in place for 7 days for 5 weeks 8/17/17  Yes Delilah Molina MD   order for DME Equipment being ordered: one donut for sitting 12/19/16  Yes Wegener, Joel Daniel Irwin, MD   ORDER FOR DME Equipment " being ordered: BP cuff 1/13/14  Yes Estrella Bowman NP   oxyCODONE IR (ROXICODONE) 10 MG tablet Take 1 tablet (10 mg) by mouth every 6 hours as needed for moderate to severe pain 6/28/18  Yes Elvis Sauceda MD   promethazine (PHENERGAN) 25 MG tablet TAKE 1 TABLET (25 MG) BY MOUTH EVERY 6 HOURS AS NEEDED FOR NAUSEA 12/11/18  Yes Wegener, Joel Daniel Irwin, MD   promethazine (PHENERGAN) 25 MG tablet TAKE 1 TABLET (25 MG) BY MOUTH EVERY 6 HOURS AS NEEDED FOR NAUSEA 12/7/18  Yes Ksenia Drummond MD   propranolol HCl 60 MG TABS Take 1 tablet (60 mg) by mouth 2 times daily  Patient taking differently: Take 40 mg by mouth 2 times daily  10/9/18  Yes Wegener, Joel Daniel Irwin, MD   rosuvastatin (CRESTOR) 10 MG tablet Take 1 tablet (10 mg) by mouth daily 10/11/18  Yes Wegener, Joel Daniel Irwin, MD   sertraline (ZOLOFT) 100 MG tablet TAKE 2 TABLETS BY MOUTH EVERY DAY 1/4/19  Yes Ksenia Drummond MD   spironolactone (ALDACTONE) 50 MG tablet Take 1 tablet (50 mg) by mouth daily 12/21/18 12/21/19 Yes Wegener, Joel Daniel Irwin, MD   STATIN NOT PRESCRIBED, INTENTIONAL, Please choose reason not prescribed, below 9/17/18  Yes Wegener, Joel Daniel Irwin, MD   Syringe, Disposable, 25 ML MISC Syringe to be used for nasal irrigation 8/19/16  Yes Antonio Chávez MD       Allergies  Allergies   Allergen Reactions     Lisinopril Anaphylaxis     Swollen tongue; inability to swallow; drooling; hives; swollen face, neck, angioedema     Acetaminophen      Hx of cirrhosis      Amlodipine      Swelling, hives, possible angioedema       Morphine      b/p dropped and arms went numb  Hypotension     Quinolones Hives     Bactrim [Sulfamethoxazole W/Trimethoprim] Rash     Levaquin Swelling and Rash     Swelling in lip and tongue felt thick       Review of systems  A 10-point review of systems was negative    Examination  /82   Pulse 59   Temp 97.4  F (36.3  C)   Wt 99.6 kg (219 lb 9.6 oz)    SpO2 99%   BMI 30.63 kg/m    Body mass index is 30.63 kg/m .    Gen- well, NAD, A+Ox3, normal color  Lym- no palpable LAD  CVS- RRR  RS- CTA  Abd- Not distended. No hepatosplenomegaly.  Extr- hands normal, no EVA  Skin- no rash or jaundice  Neuro- no asterixis  Psych- normal mood    Laboratory  Lab Results   Component Value Date     01/14/2019    POTASSIUM 3.4 01/14/2019    CHLORIDE 104 01/14/2019    CO2 24 01/14/2019    BUN 9 01/14/2019    CR 0.66 01/14/2019       Lab Results   Component Value Date    BILITOTAL 0.6 01/14/2019    ALT 40 01/14/2019    AST 29 01/14/2019    ALKPHOS 115 01/14/2019       Lab Results   Component Value Date    ALBUMIN 4.1 01/14/2019    PROTTOTAL 8.6 01/14/2019        Lab Results   Component Value Date    WBC 8.4 01/14/2019    HGB 14.6 01/14/2019    MCV 84 01/14/2019     01/14/2019       Lab Results   Component Value Date    INR 1.11 01/14/2019       Radiology    ASSESSMENT AND PLAN:  Mr. Aguilar is a 58-year-old male with alcoholic liver disease.  He initially presented with esophageal variceal bleed which were banded, but since then he has abstained from alcoholic beverages and did relatively well.  His MELD score is very low as 67, so he will continue doing surveillance for HCC and he has not done an ultrasound now as his last one was on 09/10/2018.  We ordered another one to do it at 6 months from that.  He also had some gallstones without any cholecystitis, but the last time he had an upper endoscopy was in 08/2018 at which time the esophagus appeared normal, although there was some hematin in the stomach.  Otherwise, nothing to intervene.  Our plan now is to see him every 6-12 months for surveillance for HCC and worsening.  He is very aware that he should not be going back on alcohol and he is very comfortable with that.  For his other medical issues, he will follow with his primary care physician.      This was a 25-minute visit of which more than 50% was spent in  explaining to the patient what our plan of care was.  We answered all of his questions.      cc:  Primary care physician       Kimberly Ramirez MD  Hepatology  [facility]

## 2019-01-14 NOTE — TELEPHONE ENCOUNTER
"Requested Prescriptions   Pending Prescriptions Disp Refills     propranolol (INDERAL) 40 MG tablet [Pharmacy Med Name: PROPRANOLOL 40 MG TABLET]  DISCONTINUED 10/9/18  Last Written Prescription Date:  7/24/18  Last Fill Quantity: 180 TABLET,  # refills: 1   Last office visit: 12/21/2018 with prescribing provider:  WEGENER   Future Office Visit:     180 tablet 1     Sig: TAKE 1 TABLET (40 MG) BY MOUTH 2 TIMES DAILY    Beta-Blockers Protocol Failed - 1/14/2019  5:05 PM       Failed - Blood pressure under 140/90 in past 12 months    BP Readings from Last 3 Encounters:   01/14/19 157/82   12/21/18 161/82   11/23/18 158/82                Passed - Patient is age 6 or older       Passed - Recent (12 mo) or future (30 days) visit within the authorizing provider's specialty    Patient had office visit in the last 12 months or has a visit in the next 30 days with authorizing provider or within the authorizing provider's specialty.  See \"Patient Info\" tab in inbasket, or \"Choose Columns\" in Meds & Orders section of the refill encounter.             Passed - Medication is active on med list          "

## 2019-01-16 ENCOUNTER — MYC MEDICAL ADVICE (OUTPATIENT)
Dept: FAMILY MEDICINE | Facility: CLINIC | Age: 59
End: 2019-01-16

## 2019-01-16 DIAGNOSIS — I10 HYPERTENSION GOAL BP (BLOOD PRESSURE) < 140/90: Primary | ICD-10-CM

## 2019-01-16 DIAGNOSIS — E78.5 HYPERLIPIDEMIA LDL GOAL <100: ICD-10-CM

## 2019-01-16 DIAGNOSIS — E11.42 TYPE 2 DIABETES MELLITUS WITH DIABETIC POLYNEUROPATHY, WITH LONG-TERM CURRENT USE OF INSULIN (H): ICD-10-CM

## 2019-01-16 DIAGNOSIS — Z79.4 TYPE 2 DIABETES MELLITUS WITH DIABETIC POLYNEUROPATHY, WITH LONG-TERM CURRENT USE OF INSULIN (H): ICD-10-CM

## 2019-01-16 RX ORDER — PROPRANOLOL HYDROCHLORIDE 40 MG/1
40 TABLET ORAL 2 TIMES DAILY
Qty: 180 TABLET | Refills: 3 | Status: SHIPPED | OUTPATIENT
Start: 2019-01-16 | End: 2019-08-02

## 2019-01-17 RX ORDER — ROSUVASTATIN CALCIUM 20 MG/1
20 TABLET, COATED ORAL DAILY
Qty: 90 TABLET | Refills: 3 | Status: SHIPPED | OUTPATIENT
Start: 2019-01-17 | End: 2020-02-27

## 2019-01-17 RX ORDER — PROPRANOLOL HYDROCHLORIDE 40 MG/1
40 TABLET ORAL 2 TIMES DAILY
Qty: 180 TABLET | Refills: 1 | OUTPATIENT
Start: 2019-01-17

## 2019-01-21 ENCOUNTER — ANCILLARY PROCEDURE (OUTPATIENT)
Dept: ULTRASOUND IMAGING | Facility: CLINIC | Age: 59
End: 2019-01-21
Payer: MEDICARE

## 2019-01-21 DIAGNOSIS — K70.30 ALCOHOLIC CIRRHOSIS OF LIVER WITHOUT ASCITES (H): ICD-10-CM

## 2019-01-22 DIAGNOSIS — E11.9 TYPE 2 DIABETES MELLITUS WITHOUT COMPLICATION, WITH LONG-TERM CURRENT USE OF INSULIN (H): ICD-10-CM

## 2019-01-22 DIAGNOSIS — Z79.4 TYPE 2 DIABETES MELLITUS WITHOUT COMPLICATION, WITH LONG-TERM CURRENT USE OF INSULIN (H): ICD-10-CM

## 2019-01-22 NOTE — TELEPHONE ENCOUNTER
"Requested Prescriptions   Pending Prescriptions Disp Refills     insulin glargine (BASAGLAR KWIKPEN) 100 UNIT/ML pen [Pharmacy Med Name: BASAGLAR 100 UNIT/ML KWIKPEN]  Last Written Prescription Date:  12/13/2017  Last Fill Quantity: 30ml,  # refills: 11   Last Office Visit: 12/21/2018   Future Office Visit:       7     Sig: INJECT 26 UNITS SUBCUTANEOUS DAILY    Long Acting Insulin Protocol Failed - 1/22/2019 11:29 AM       Failed - Blood pressure less than 140/90 in past 6 months    BP Readings from Last 3 Encounters:   01/14/19 157/82   12/21/18 161/82   11/23/18 158/82          Failed - Microalbumin on file in past 12 months    Recent Labs   Lab Test 07/24/13  1130   MICROL <5 Urine Microalbumin lowest reportable value has been changed from 2 mg/L to 5  mg/L due to a methodology change on April 16,2012.   UMALCR Unable to calculate          Passed - LDL on file in past 12 months    Recent Labs   Lab Test 01/14/19  0903   *          Passed - Serum creatinine on file in past 12 months    Recent Labs   Lab Test 01/14/19  0903  12/22/12  1838   CR 0.66   < >  --    CREAT  --   --  0.9    < > = values in this interval not displayed.          Passed - HgbA1C in past 3 or 6 months    If HgbA1C is 8 or greater, it needs to be on file within the past 3 months.  If less than 8, must be on file within the past 6 months.     Recent Labs   Lab Test 01/14/19  0903  02/01/18   A1C 7.2*   < >  --    HEMOGLOBINA1  --   --  5.6    < > = values in this interval not displayed.          Passed - Medication is active on med list       Passed - Patient is age 18 or older       Passed - Recent (6 mo) or future (30 days) visit within the authorizing provider's specialty    Patient had office visit in the last 6 months or has a visit in the next 30 days with authorizing provider or within the authorizing provider's specialty.  See \"Patient Info\" tab in inbasket, or \"Choose Columns\" in Meds & Orders section of the refill encounter.  "

## 2019-01-25 RX ORDER — INSULIN GLARGINE 100 [IU]/ML
26 INJECTION, SOLUTION SUBCUTANEOUS DAILY
Qty: 3 ML | Refills: 11 | Status: SHIPPED | OUTPATIENT
Start: 2019-01-25 | End: 2020-02-04

## 2019-02-07 DIAGNOSIS — K70.30 ALCOHOLIC CIRRHOSIS OF LIVER WITHOUT ASCITES (H): Primary | ICD-10-CM

## 2019-03-23 DIAGNOSIS — Z79.4 TYPE 2 DIABETES MELLITUS WITHOUT COMPLICATION, WITH LONG-TERM CURRENT USE OF INSULIN (H): ICD-10-CM

## 2019-03-23 DIAGNOSIS — E11.9 TYPE 2 DIABETES MELLITUS WITHOUT COMPLICATION, WITH LONG-TERM CURRENT USE OF INSULIN (H): ICD-10-CM

## 2019-03-25 RX ORDER — PEN NEEDLE, DIABETIC 32GX 5/32"
NEEDLE, DISPOSABLE MISCELLANEOUS
Qty: 30 EACH | Refills: 0 | Status: SHIPPED | OUTPATIENT
Start: 2019-03-25 | End: 2019-06-28

## 2019-03-25 NOTE — TELEPHONE ENCOUNTER
"Requested Prescriptions   Pending Prescriptions Disp Refills     BD ROSE U/F 32G X 4 MM insulin pen needle [Pharmacy Med Name: BD UF ROSE PEN NEEDLE [id number]]  4     Sig: USE ONCE DAILY AS DIRECTED    Diabetic Supplies Protocol Passed - 3/23/2019  9:07 AM       Passed - Medication is active on med list       Passed - Patient is 18 years of age or older       Passed - Recent (6 mo) or future (30 days) visit within the authorizing provider's specialty    Patient had office visit in the last 6 months or has a visit in the next 30 days with authorizing provider.  See \"Patient Info\" tab in inbasket, or \"Choose Columns\" in Meds & Orders section of the refill encounter.            Office visit 12/21/18    Type 2 diabetes mellitus with diabetic polyneuropathy, with long-term current use of insulin (H)  Follow up end of January.  I placed a1c order to do with GI specialist labs if desired Jan 14.      Medication is being filled for 1 time refill only due to:  Patient needs to be seen because due for diabetes follow up.     Signed Prescriptions:                        Disp   Refills    BD ROSE U/F 32G X 4 MM insulin pen needle  30 each0        Sig: USE ONCE DAILY AS DIRECTED  Authorizing Provider: WEGENER, JOEL DANIEL IRWIN  Ordering User: BRENDAN ARRIAZA      Routing to  Reception - one month refill due for f/u office visit for diabetes please    Thank you,  Brendan Arriaza RN      "

## 2019-04-02 ENCOUNTER — OFFICE VISIT (OUTPATIENT)
Dept: FAMILY MEDICINE | Facility: CLINIC | Age: 59
End: 2019-04-02
Payer: MEDICARE

## 2019-04-02 VITALS
HEART RATE: 52 BPM | TEMPERATURE: 98.5 F | RESPIRATION RATE: 16 BRPM | WEIGHT: 221.5 LBS | SYSTOLIC BLOOD PRESSURE: 136 MMHG | HEIGHT: 71 IN | DIASTOLIC BLOOD PRESSURE: 81 MMHG | BODY MASS INDEX: 31.01 KG/M2 | OXYGEN SATURATION: 99 %

## 2019-04-02 DIAGNOSIS — I10 HYPERTENSION GOAL BP (BLOOD PRESSURE) < 140/90: ICD-10-CM

## 2019-04-02 DIAGNOSIS — K70.30 ALCOHOLIC CIRRHOSIS OF LIVER WITHOUT ASCITES (H): ICD-10-CM

## 2019-04-02 DIAGNOSIS — Z79.4 TYPE 2 DIABETES MELLITUS WITHOUT COMPLICATION, WITH LONG-TERM CURRENT USE OF INSULIN (H): Primary | ICD-10-CM

## 2019-04-02 DIAGNOSIS — E11.9 TYPE 2 DIABETES MELLITUS WITHOUT COMPLICATION, WITH LONG-TERM CURRENT USE OF INSULIN (H): Primary | ICD-10-CM

## 2019-04-02 DIAGNOSIS — E78.5 HYPERLIPIDEMIA LDL GOAL <100: ICD-10-CM

## 2019-04-02 LAB
ALBUMIN SERPL-MCNC: 4.1 G/DL (ref 3.4–5)
ALP SERPL-CCNC: 110 U/L (ref 40–150)
ALT SERPL W P-5'-P-CCNC: 32 U/L (ref 0–70)
ANION GAP SERPL CALCULATED.3IONS-SCNC: 6 MMOL/L (ref 3–14)
AST SERPL W P-5'-P-CCNC: 24 U/L (ref 0–45)
BILIRUB SERPL-MCNC: 0.6 MG/DL (ref 0.2–1.3)
BUN SERPL-MCNC: 8 MG/DL (ref 7–30)
CALCIUM SERPL-MCNC: 9.6 MG/DL (ref 8.5–10.1)
CHLORIDE SERPL-SCNC: 104 MMOL/L (ref 94–109)
CHOLEST SERPL-MCNC: 214 MG/DL
CO2 SERPL-SCNC: 29 MMOL/L (ref 20–32)
CREAT SERPL-MCNC: 0.74 MG/DL (ref 0.66–1.25)
GFR SERPL CREATININE-BSD FRML MDRD: >90 ML/MIN/{1.73_M2}
GLUCOSE SERPL-MCNC: 146 MG/DL (ref 70–99)
HBA1C MFR BLD: 6.8 % (ref 0–5.6)
HDLC SERPL-MCNC: 41 MG/DL
LDLC SERPL CALC-MCNC: 147 MG/DL
NONHDLC SERPL-MCNC: 173 MG/DL
POTASSIUM SERPL-SCNC: 3.4 MMOL/L (ref 3.4–5.3)
PROT SERPL-MCNC: 8.6 G/DL (ref 6.8–8.8)
SODIUM SERPL-SCNC: 139 MMOL/L (ref 133–144)
TRIGL SERPL-MCNC: 132 MG/DL

## 2019-04-02 PROCEDURE — 80053 COMPREHEN METABOLIC PANEL: CPT | Performed by: FAMILY MEDICINE

## 2019-04-02 PROCEDURE — 80061 LIPID PANEL: CPT | Performed by: FAMILY MEDICINE

## 2019-04-02 PROCEDURE — 83036 HEMOGLOBIN GLYCOSYLATED A1C: CPT | Performed by: FAMILY MEDICINE

## 2019-04-02 PROCEDURE — 99213 OFFICE O/P EST LOW 20 MIN: CPT | Performed by: FAMILY MEDICINE

## 2019-04-02 PROCEDURE — 36415 COLL VENOUS BLD VENIPUNCTURE: CPT | Performed by: FAMILY MEDICINE

## 2019-04-02 ASSESSMENT — ANXIETY QUESTIONNAIRES
1. FEELING NERVOUS, ANXIOUS, OR ON EDGE: NOT AT ALL
IF YOU CHECKED OFF ANY PROBLEMS ON THIS QUESTIONNAIRE, HOW DIFFICULT HAVE THESE PROBLEMS MADE IT FOR YOU TO DO YOUR WORK, TAKE CARE OF THINGS AT HOME, OR GET ALONG WITH OTHER PEOPLE: NOT DIFFICULT AT ALL
3. WORRYING TOO MUCH ABOUT DIFFERENT THINGS: SEVERAL DAYS
5. BEING SO RESTLESS THAT IT IS HARD TO SIT STILL: NOT AT ALL
2. NOT BEING ABLE TO STOP OR CONTROL WORRYING: SEVERAL DAYS
6. BECOMING EASILY ANNOYED OR IRRITABLE: NOT AT ALL
7. FEELING AFRAID AS IF SOMETHING AWFUL MIGHT HAPPEN: NOT AT ALL
GAD7 TOTAL SCORE: 2

## 2019-04-02 ASSESSMENT — MIFFLIN-ST. JEOR: SCORE: 1841.85

## 2019-04-02 ASSESSMENT — PATIENT HEALTH QUESTIONNAIRE - PHQ9
SUM OF ALL RESPONSES TO PHQ QUESTIONS 1-9: 2
5. POOR APPETITE OR OVEREATING: NOT AT ALL

## 2019-04-02 NOTE — PROGRESS NOTES
"  SUBJECTIVE:   Erwin Aguilar is a 58 year old male who presents to clinic today for the following health issues:    Diabetes Follow-up      Patient is checking blood sugars: not at all    Diabetic concerns: None     Symptoms of hypoglycemia (low blood sugar): none     Paresthesias (numbness or burning in feet) or sores: No     Date of last diabetic eye exam:     BP Readings from Last 2 Encounters:   01/14/19 157/82   12/21/18 161/82     Hemoglobin A1C (%)   Date Value   04/02/2019 6.8 (H)   01/14/2019 7.2 (H)     LDL Cholesterol Calculated (mg/dL)   Date Value   01/14/2019 120 (H)   10/09/2018 170 (H)       Diabetes Management Resources    Amount of exercise or physical activity: walking    Problems taking medications regularly: No    Medication side effects: none    Diet: regular (no restrictions)           Type 2 diabetes mellitus without complication, with long-term current use of insulin (H)  Hypertension goal BP (blood pressure) < 140/90  Hyperlipidemia LDL goal <100  Alcoholic cirrhosis of liver without ascites (H) :overall feels health going quite well.  No alcohol for 5 years.  Liver improving.  No longer on chronic opiods.  Pain tolerable after spinal stimulator removed/replaced.      Is \"able to pee standing up again! \"      Losing weight.      Walking more.      Blood sugars excellent.         Problem list, Medication list, Allergies, and Medical/Social/Surgical histories reviewed in [facility] and updated as appropriate.  Labs reviewed in [facility]  BP Readings from Last 3 Encounters:   04/02/19 136/81   01/14/19 157/82   12/21/18 161/82    Wt Readings from Last 3 Encounters:   04/02/19 100.5 kg (221 lb 8 oz)   01/14/19 99.6 kg (219 lb 9.6 oz)   12/21/18 102.5 kg (226 lb)                  Patient Active Problem List   Diagnosis     Generalized anxiety disorder     Type 2 diabetes, HbA1c goal < 7% (H)     Hyperlipidemia LDL goal <100     Hypertension goal BP (blood pressure) < 140/90     Major depressive disorder, " recurrent episode, mild (H)     Chronic pain syndrome     GERD (gastroesophageal reflux disease)     Abnormal liver function tests     Health Care Home     Abnormal EMG     Hernia     Prolonged QT interval     Diastasis recti     Hypokalemia     Cauda equina syndrome with neurogenic bladder (H)     Wound infection     Cellulitis     Nausea without vomiting     Atopic dermatitis     Muscle spasms of Upper extremity     Edema of left lower extremity     Esophageal varices in cirrhosis (H)     Cirrhosis of liver (H)     Anemia due to blood loss, acute     Skin infection     Superficial thrombophlebitis of arm     Cellulitis of hand     Ganglion cyst     Dry skin dermatitis     Moderate persistent asthma     Open wound of right heel     Fall     Closed fracture of right patella     Right knee pain     Alcoholic cirrhosis of liver without ascites (H)     Lumbosacral radiculopathy     Calculus of gallbladder without cholecystitis without obstruction     Type 2 diabetes mellitus without complication, with long-term current use of insulin (H)     Wound, open, buttock, right     Type 2 diabetes mellitus with diabetic polyneuropathy, with long-term current use of insulin (H)     Benign neoplasm of skin of trunk, except scrotum     Hematemesis with nausea     Osteoarthritis of lumbar spine, unspecified spinal osteoarthritis complication status     S/P insertion of spinal cord stimulator     Past Surgical History:   Procedure Laterality Date     BACK SURGERY  August 2009     C APPENDECTOMY  1974     COLONOSCOPY N/A 5/12/2016    Procedure: COMBINED COLONOSCOPY, SINGLE OR MULTIPLE BIOPSY/POLYPECTOMY BY BIOPSY;  Surgeon: Ana Paula Urbina MD;  [facility]     ENDOSCOPY UPPER, COLONOSCOPY, COMBINED  10/19/2011    Procedure:COMBINED ENDOSCOPY UPPER, COLONOSCOPY; Upper Endoscopy, Colonoscopy with Polypectomy x4; Surgeon:AMBAR RODRÍGUEZ; Location:[facility]     ENT SURGERY  1-2016    Ongoing since then      ESOPHAGOSCOPY, GASTROSCOPY, DUODENOSCOPY (EGD), COMBINED  3/28/2014    Procedure: COMBINED ESOPHAGOSCOPY, GASTROSCOPY, DUODENOSCOPY (EGD);  EGD, Gastric Biopsies, Esophageal Banding;  Surgeon: Reyna Tovar MD;  [facility]     ESOPHAGOSCOPY, GASTROSCOPY, DUODENOSCOPY (EGD), COMBINED  6/9/2014    Procedure: COMBINED ESOPHAGOSCOPY, GASTROSCOPY, DUODENOSCOPY (EGD);  Surgeon: Curtis Mendez MD;  [facility]     ESOPHAGOSCOPY, GASTROSCOPY, DUODENOSCOPY (EGD), COMBINED  7/24/2014    Procedure: COMBINED ESOPHAGOSCOPY, GASTROSCOPY, DUODENOSCOPY (EGD);  Surgeon: Gerard Samaniego MD;  [facility]     ESOPHAGOSCOPY, GASTROSCOPY, DUODENOSCOPY (EGD), COMBINED N/A 10/31/2014    Procedure: COMBINED ESOPHAGOSCOPY, GASTROSCOPY, DUODENOSCOPY (EGD);  Surgeon: Gerard Samaniego MD;  [facility]     ESOPHAGOSCOPY, GASTROSCOPY, DUODENOSCOPY (EGD), COMBINED N/A 5/12/2016    Procedure: COMBINED ESOPHAGOSCOPY, GASTROSCOPY, DUODENOSCOPY (EGD);  Surgeon: Ana Paula Urbina MD;  [facility]     ESOPHAGOSCOPY, GASTROSCOPY, DUODENOSCOPY (EGD), COMBINED N/A 8/2/2018    Procedure: COMBINED ESOPHAGOSCOPY, GASTROSCOPY, DUODENOSCOPY (EGD);  EGD;  Surgeon: Yu Wagner MD;  [facility]     HCL SQUAMOUS CELL CARCINOMA AG  10/07    left forearm     HERNIORRHAPHY UMBILICAL  11/8/2012    Procedure: HERNIORRHAPHY UMBILICAL;  Open Umbilical Hernia Repair With Mesh ;  Surgeon: Chase Nicholson MD;  [facility]     INSERT STIMULATOR DORSAL COLUMN N/A 6/28/2018    Procedure: INSERT STIMULATOR DORSAL COLUMN;;  Surgeon: Elvis Sauceda MD;  [facility]     neuro stimulator  2010     REMOVE GENERATOR STIMULATOR (LOCATION) N/A 6/28/2018    Procedure: REMOVE GENERATOR STIMULATOR (LOCATION);  Spinal Cord Stimulator and IPG Explant and Re-Implant of SCS System (Leads and IPG);  Surgeon: Elvis Sauceda MD;  [facility]     SURGICAL HISTORY OF -        abcess tooth     SURGICAL HISTORY OF -       L4-5 laminectomy, cauda equina syndrome     SURGICAL HISTORY OF -   +    herniated disk repair     TONSILLECTOMY  10 1964     TRANSPOSITION ULNAR NERVE (ELBOW)      right       Social History     Tobacco Use     Smoking status: Former Smoker     Packs/day: 0.50     Years: 1.00     Pack years: 0.50     Types: Cigarettes     Start date: 10/13/1983     Last attempt to quit: 1984     Years since quittin.8     Smokeless tobacco: Never Used   Substance Use Topics     Alcohol use: No     Alcohol/week: 0.0 oz     Comment: a pint of alcohol / day - last drink was 3/28/14     Family History   Problem Relation Age of Onset     Cancer Mother         lung     Breast Cancer Mother      Other Cancer Mother      Cancer Father         esophogeal, GERD     Diabetes Brother         amputation, Type 1     Diabetes Brother      Diabetes Brother      Cancer - colorectal No family hx of          Current Outpatient Medications   Medication Sig Dispense Refill     ACE/ARB NOT PRESCRIBED, INTENTIONAL, ACE & ARB not prescribed due to Allergy       albuterol (PROAIR HFA/PROVENTIL HFA/VENTOLIN HFA) 108 (90 Base) MCG/ACT Inhaler Inhale 2 puffs into the lungs every 6 hours as needed for shortness of breath / dyspnea or wheezing Ventolin or other covered brand 3 Inhaler 3     ammonium lactate (LAC-HYDRIN) 12 % cream Apply topically 2 times daily as needed for dry skin 385 g 3     baclofen (LIORESAL) 10 MG tablet TAKE 2 TABLETS BY MOUTH THREE TIMES DAILY 180 tablet 11     BD ROSE U/F 32G X 4 MM insulin pen needle USE ONCE DAILY AS DIRECTED 30 each 0     blood glucose monitoring (NO BRAND SPECIFIED) test strip Use to test blood sugars 1 time daily. ACCU-CHEK KARISHMA BRAND PLEASE. 100 strip 3     blood glucose monitoring (NO BRAND SPECIFIED) test strip Use to test blood sugars one time daily or as directed    Accucheck Richards plus test strips 1 Box 5     blood glucose monitoring  (SOFTCLIX) lancets Use to test blood sugar 1 time daily or as directed. 100 each 3     Blood Glucose Monitoring Suppl (ACURA BLOOD GLUCOSE METER) W/DEVICE KIT 1 Dose 2 times daily 1 kit 1     insulin glargine (BASAGLAR KWIKPEN) 100 UNIT/ML pen INJECT 26 UNITS SUBCUTANEOUS DAILY 3 mL 11     mupirocin (BACTROBAN) 2 % ointment Apply to anterior nares BID X 2 month supply (Patient taking differently: Apply topically as needed Apply to anterior nares BID X 2 month supply) 2 g 3     promethazine (PHENERGAN) 25 MG tablet TAKE 1 TABLET (25 MG) BY MOUTH EVERY 6 HOURS AS NEEDED FOR NAUSEA 120 tablet 11     promethazine (PHENERGAN) 25 MG tablet TAKE 1 TABLET (25 MG) BY MOUTH EVERY 6 HOURS AS NEEDED FOR NAUSEA 40 tablet 0     propranolol (INDERAL) 40 MG tablet Take 1 tablet (40 mg) by mouth 2 times daily 180 tablet 3     rosuvastatin (CRESTOR) 20 MG tablet Take 1 tablet (20 mg) by mouth daily 90 tablet 3     sertraline (ZOLOFT) 100 MG tablet TAKE 2 TABLETS BY MOUTH EVERY  tablet 1     spironolactone (ALDACTONE) 50 MG tablet Take 1 tablet (50 mg) by mouth daily 90 tablet 3     STATIN NOT PRESCRIBED, INTENTIONAL, Please choose reason not prescribed, below       Syringe, Disposable, 25 ML MISC Syringe to be used for nasal irrigation 1 each 3     hydrochlorothiazide (HYDRODIURIL) 25 MG tablet TAKE 1 TABLET (25 MG) BY MOUTH DAILY 90 tablet 3     Allergies   Allergen Reactions     Lisinopril Anaphylaxis     Swollen tongue; inability to swallow; drooling; hives; swollen face, neck, angioedema     Acetaminophen      Hx of cirrhosis      Amlodipine      Swelling, hives, possible angioedema       Morphine      b/p dropped and arms went numb  Hypotension     Quinolones Hives     Bactrim [Sulfamethoxazole W/Trimethoprim] Rash     Levaquin Swelling and Rash     Swelling in lip and tongue felt thick     Recent Labs   Lab Test 04/02/19  0927 01/14/19  0903 10/09/18  0923 07/11/18  0707  10/05/15  0923   A1C 6.8* 7.2* 6.1*  --    < >   "--    * 120* 170*  --    < >  --    HDL 41 38* 44  --    < >  --    TRIG 132 108 221*  --    < >  --    ALT 32 40  --  19   < > 24   CR 0.74 0.66  --  0.65*   < > 0.78   GFRESTIMATED >90 >90  --  >90   < > >90  Non  GFR Calc     GFRESTBLACK >90 >90  --  >90   < > >90  African American GFR Calc     POTASSIUM 3.4 3.4  --  3.6   < > 3.3*   TSH  --   --  1.36  --   --  1.08    < > = values in this interval not displayed.        ROS:  Constitutional, HEENT, cardiovascular, pulmonary, GI, , musculoskeletal, neuro, skin, endocrine and psych systems are negative, except as otherwise noted.        OBJECTIVE:  /81 (BP Location: Right arm, Patient Position: Sitting, Cuff Size: Adult Large)   Pulse 52   Temp 98.5  F (36.9  C) (Oral)   Resp 16   Ht 1.803 m (5' 11\")   Wt 100.5 kg (221 lb 8 oz)   SpO2 99%   BMI 30.89 kg/m      EXAM:  GENERAL APPEARANCE: healthy, alert and no distress    Lab Results   Component Value Date    A1C 6.8 04/02/2019    A1C 7.2 01/14/2019    A1C 6.1 10/09/2018    A1C 6.2 06/18/2018    A1C 6.4 12/20/2017         ASSESSMENT AND PLAN  Patient Instructions   A1c, blood pressue at goal. No changes.     Check cmp, lipids due to increased statin dose, addition of spironolactone (check for increased lfts and potassium).     Follow up nurse visit for pcv 23, shingrix, tdap.      Follow up 6 months    ASSESSMENT AND PLAN  1. Type 2 diabetes mellitus without complication, with long-term current use of insulin (H)    - Albumin Random Urine Quantitative with Creat Ratio; Future  - Hemoglobin A1c; Future  - Hemoglobin A1c  - Albumin Random Urine Quantitative with Creat Ratio; Future    2. Hypertension goal BP (blood pressure) < 140/90    - Albumin Random Urine Quantitative with Creat Ratio; Future  - Comprehensive metabolic panel    3. Hyperlipidemia LDL goal <100    - Lipid panel reflex to direct LDL Fasting    4. Alcoholic cirrhosis of liver without ascites (H)    - Comprehensive " "metabolic panel        MYCHART FOR ON-LINE CARE(VISITS), LABS, REFILLS, MESSAGING, ETC [email or web address]    E-VISIT: click \"on-line care, then request e-visit\".  E-visits work well for following up on issues we have discussed in clinic previously which may need new prescriptions, new prescriptions or substantial discussion. These are always done by me (Dr. Wegener).     ONCARE VISIT:  [email or web address]  - we treat nearly 50 common conditions through on-care.  These are done in an hour by on-call staff.     RADIOLOGY:  [facility]:  [phone]   [facility]: [phone]    Mammogram and Colonoscopy [phone]    Smoking Cessation: [email or web address], [phone]-[phone] ([phone])      CONSUMER PRICE LINE for estimates of test costs:  [phone]         Joel Wegener, MD        "

## 2019-04-02 NOTE — PATIENT INSTRUCTIONS
A1c, blood pressue at goal. No changes.     Check cmp, lipids due to increased statin dose, addition of spironolactone (check for increased lfts and potassium).     Follow up nurse visit for pcv 23, shingrix, tdap.      Follow up 6 months

## 2019-04-03 ASSESSMENT — ANXIETY QUESTIONNAIRES: GAD7 TOTAL SCORE: 2

## 2019-04-04 ENCOUNTER — MYC MEDICAL ADVICE (OUTPATIENT)
Dept: FAMILY MEDICINE | Facility: CLINIC | Age: 59
End: 2019-04-04

## 2019-04-04 DIAGNOSIS — I10 HYPERTENSION GOAL BP (BLOOD PRESSURE) < 140/90: ICD-10-CM

## 2019-04-04 RX ORDER — HYDROCHLOROTHIAZIDE 25 MG/1
TABLET ORAL
Qty: 90 TABLET | Refills: 3 | Status: SHIPPED | OUTPATIENT
Start: 2019-04-04 | End: 2020-05-27

## 2019-04-04 NOTE — TELEPHONE ENCOUNTER
"Requested Prescriptions   Pending Prescriptions Disp Refills     hydrochlorothiazide (HYDRODIURIL) 25 MG tablet [Pharmacy Med Name: HYDROCHLOROTHIAZIDE 25 MG TAB]  Last Written Prescription Date:  3/23/2018  Last Fill Quantity: 90 tablet,  # refills: 3   Last Office Visit: 4/2/2019   Future Office Visit:      90 tablet 3     Sig: TAKE 1 TABLET (25 MG) BY MOUTH DAILY    Diuretics (Including Combos) Protocol Passed - 4/4/2019  1:45 AM       Passed - Blood pressure under 140/90 in past 12 months    BP Readings from Last 3 Encounters:   04/02/19 136/81   01/14/19 157/82   12/21/18 161/82          Passed - Recent (12 mo) or future (30 days) visit within the authorizing provider's specialty    Patient had office visit in the last 12 months or has a visit in the next 30 days with authorizing provider or within the authorizing provider's specialty.  See \"Patient Info\" tab in inbasket, or \"Choose Columns\" in Meds & Orders section of the refill encounter.           Passed - Medication is active on med list       Passed - Patient is age 18 or older       Passed - Normal serum creatinine on file in past 12 months    Recent Labs   Lab Test 04/02/19 0927   CR 0.74           Passed - Normal serum potassium on file in past 12 months    Recent Labs   Lab Test 04/02/19 0927   POTASSIUM 3.4           Passed - Normal serum sodium on file in past 12 months    Recent Labs   Lab Test 04/02/19 0927                   "

## 2019-04-05 NOTE — TELEPHONE ENCOUNTER
Prescription approved per [facility] Refill Protocol.    Signed Prescriptions:                        Disp   Refills    hydrochlorothiazide (HYDRODIURIL) 25 MG ta*90 tab*3        Sig: TAKE 1 TABLET (25 MG) BY MOUTH DAILY  Authorizing Provider: MARIA R FRANCIS  Ordering User: BRENDAN ARRIAZA      Closing encounter - no further actions needed at this time    Brendan Arriaza RN

## 2019-04-29 ENCOUNTER — OFFICE VISIT (OUTPATIENT)
Dept: ORTHOPEDICS | Facility: CLINIC | Age: 59
End: 2019-04-29
Payer: MEDICARE

## 2019-04-29 DIAGNOSIS — L60.0 ONYCHOCRYPTOSIS: Primary | ICD-10-CM

## 2019-04-29 NOTE — NURSING NOTE
Reason For Visit:   Chief Complaint   Patient presents with     RECHECK     Left great toe ingrown nail       There were no vitals taken for this visit.    Pain Assessment  Patient Currently in Pain: Yeison Mitchell, [facility]  [address] [address] [phone]  Dept: [phone]  ______________________________________________________________________________    Patient: Erwin Aguilar   : 1960   MRN: [id number]   2019    INVASIVE PROCEDURE SAFETY CHECKLIST    Date: 2019   Procedure: left great toe nail removal  Patient Name: Erwin Aguilar  MRN: [id number]  YOB: 1960    Action: Complete sections as appropriate. Any discrepancy results in a HARD COPY until resolved.     PRE PROCEDURE:  Patient ID verified with 2 identifiers (name and  or MRN): Yes  Procedure and site verified with patient/designee (when able): Yes  Accurate consent documentation in medical record: Yes  H&P (or appropriate assessment) documented in medical record: NA  H&P must be up to 20 days prior to procedure and updates within 24 hours of procedure as applicable: NA  Relevant diagnostic and radiology test results appropriately labeled and displayed as applicable: Yes  Procedure site(s) marked with provider initials: NA    TIMEOUT:  Time-Out performed immediately prior to starting procedure, including verbal and active participation of all team members addressing the following:Yes  * Correct patient identify  * Confirmed that the correct side and site are marked  * An accurate procedure consent form  * Agreement on the procedure to be done  * Correct patient position  * Relevant images and results are properly labeled and appropriately displayed  * The need to administer antibiotics or fluids for irrigation purposes during the procedure as applicable   * Safety precautions based on patient history or medication use    DURING  PROCEDURE: Verification of correct person, site, and procedures any time the responsibility for care of the patient is transferred to another member of the care team.       The following medications were given:         Prior to injection, verified patient identity using patient's name and date of birth.  Due to injection administration, patient instructed to remain in clinic for 15 minutes  afterwards, and to report any adverse reaction to me immediately.    Left great toenail removal  Medication Name: Lidocaine NDC - 31642-338-36  Lot: [id number]  Drug Amount Wasted:  Yes: 2 mg/ml   Vial/Syringe: Single dose vial  Expiration Date:  09/2022        Dulce Mitchell, ATC

## 2019-04-29 NOTE — LETTER
4/29/2019       RE: Erwin Aguilar  [address]  [place] [address]     Dear Colleague,    Thank you for referring your patient, Erwin Aguilar, to the [facility]. Please see a copy of my visit note below.    Removal of Nail Bed  Date/Time: 4/29/2019 11:41 AM  Performed by: Victor M Anaya DPM  Authorized by: Victor M Anaya DPM     Consent:     Consent obtained:  Verbal    Consent given by:  Patient    Risks discussed:  Bleeding, incomplete removal, infection, pain and permanent nail deformity  Location:     Foot:  L big toe  Pre-procedure details:     Preparation: Patient was prepped and draped in the usual sterile fashion    Anesthesia (see MAR for exact dosages):     Anesthesia method:  Local infiltration    Local anesthetic:  Lidocaine 1% w/o epi  Nail Removal:     Nail removed:  Partial    Nail bed repaired: no      Removed nail replaced and anchored: no    Ingrown nail:     Nail matrix removed or ablated:  Partial      Chief Complaint:   Chief Complaint   Patient presents with     RECHECK     Left great toe ingrown nail          Allergies   Allergen Reactions     Lisinopril Anaphylaxis     Swollen tongue; inability to swallow; drooling; hives; swollen face, neck, angioedema     Acetaminophen      Hx of cirrhosis      Amlodipine      Swelling, hives, possible angioedema       Morphine      b/p dropped and arms went numb  Hypotension     Quinolones Hives     Bactrim [Sulfamethoxazole W/Trimethoprim] Rash     Levaquin Swelling and Rash     Swelling in lip and tongue felt thick         Subjective: Erwin is a 58 year old male who presents to the clinic today for a follow up of left hallux onychocryptosis. Relates that the toe is causing him some pain. He was scheduled to have the nail partially removed, but this was deferred 2/2 back sx. He would like to have the nail removed with a matrixectomy performed today.      Objective  Data Unavailable Data Unavailable Data Unavailable Data Unavailable Data Unavailable 0 lbs 0 oz  Nail of the left hallux is incurvated medially and laterally. No s/s of infection are noted. No erythema noted to the nail borders. Pain with palpation of the nail borders.     Assessment: Onychocryptosis to the left medial and lateral nail folds on the hallux.     Plan:   - Pt seen and evaluated  - Procedure, potential risks, expected benefits and outcomes, and follow up were discussed with the patient. The consent was signed. The left first toe was anesthetized with 3 cc's of 1% lidocaine, without epi using a digital block. left first toe was prepped and draped in the usual aseptic manner. After a timeout, the anesthesia was checked and was achieved. The leftfirst toe was painted with Betadine and a tourniquet was placed. The offending nail border was freed from the underlying bed and eponychium, and an English nail anvil was used to cut the nail back to under the eponychium. A hemostat was used to remove the nail. The nail borders were curetted for debris. Phenol was then applied to the both matrix using cotton tipped applicators x 3 for 30 seconds each. The both border(s) were copiously irrigated with alcohol. The tourniquet was then released. CFT returned to normal. Bacitracin and a sterile dressing were applied to the left first toe. Pt tolerated the anesthesia and procedure well with no complications. They were given post operative care instructions.     - Pt to return to clinic in 2 weeks.        Again, thank you for allowing me to participate in the care of your patient.      Sincerely,    Victor M Anaya DPM

## 2019-04-29 NOTE — PROGRESS NOTES
Removal of Nail Bed  Date/Time: 4/29/2019 11:41 AM  Performed by: Victor M Anaya DPM  Authorized by: Victor M Anaya DPM     Consent:     Consent obtained:  Verbal    Consent given by:  Patient    Risks discussed:  Bleeding, incomplete removal, infection, pain and permanent nail deformity  Location:     Foot:  L big toe  Pre-procedure details:     Preparation: Patient was prepped and draped in the usual sterile fashion    Anesthesia (see MAR for exact dosages):     Anesthesia method:  Local infiltration    Local anesthetic:  Lidocaine 1% w/o epi  Nail Removal:     Nail removed:  Partial    Nail bed repaired: no      Removed nail replaced and anchored: no    Ingrown nail:     Nail matrix removed or ablated:  Partial      Chief Complaint:   Chief Complaint   Patient presents with     RECHECK     Left great toe ingrown nail          Allergies   Allergen Reactions     Lisinopril Anaphylaxis     Swollen tongue; inability to swallow; drooling; hives; swollen face, neck, angioedema     Acetaminophen      Hx of cirrhosis      Amlodipine      Swelling, hives, possible angioedema       Morphine      b/p dropped and arms went numb  Hypotension     Quinolones Hives     Bactrim [Sulfamethoxazole W/Trimethoprim] Rash     Levaquin Swelling and Rash     Swelling in lip and tongue felt thick         Subjective: Erwin is a 58 year old male who presents to the clinic today for a follow up of left hallux onychocryptosis. Relates that the toe is causing him some pain. He was scheduled to have the nail partially removed, but this was deferred 2/2 back sx. He would like to have the nail removed with a matrixectomy performed today.     Objective  Data Unavailable Data Unavailable Data Unavailable Data Unavailable Data Unavailable 0 lbs 0 oz  Nail of the left hallux is incurvated medially and laterally. No s/s of infection are noted. No erythema noted to the nail borders. Pain with palpation of the nail borders.      Assessment: Onychocryptosis to the left medial and lateral nail folds on the hallux.     Plan:   - Pt seen and evaluated  - Procedure, potential risks, expected benefits and outcomes, and follow up were discussed with the patient. The consent was signed. The left first toe was anesthetized with 3 cc's of 1% lidocaine, without epi using a digital block. left first toe was prepped and draped in the usual aseptic manner. After a timeout, the anesthesia was checked and was achieved. The leftfirst toe was painted with Betadine and a tourniquet was placed. The offending nail border was freed from the underlying bed and eponychium, and an English nail anvil was used to cut the nail back to under the eponychium. A hemostat was used to remove the nail. The nail borders were curetted for debris. Phenol was then applied to the both matrix using cotton tipped applicators x 3 for 30 seconds each. The both border(s) were copiously irrigated with alcohol. The tourniquet was then released. CFT returned to normal. Bacitracin and a sterile dressing were applied to the left first toe. Pt tolerated the anesthesia and procedure well with no complications. They were given post operative care instructions.     - Pt to return to clinic in 2 weeks.

## 2019-05-06 DIAGNOSIS — M62.830 BACK MUSCLE SPASM: ICD-10-CM

## 2019-05-06 DIAGNOSIS — M62.838 MUSCLE SPASMS OF BOTH LOWER EXTREMITIES: ICD-10-CM

## 2019-05-06 RX ORDER — BACLOFEN 10 MG/1
TABLET ORAL
Qty: 180 TABLET | Refills: 11 | Status: SHIPPED | OUTPATIENT
Start: 2019-05-06 | End: 2020-05-26

## 2019-05-06 NOTE — TELEPHONE ENCOUNTER
Requested Prescriptions   Pending Prescriptions Disp Refills     baclofen (LIORESAL) 10 MG tablet [Pharmacy Med Name: BACLOFEN 10 MG TABLET] 180 tablet 11     Sig: TAKE 2 TABLETS BY MOUTH THREE TIMES DAILY       There is no refill protocol information for this order              Last Written Prescription Date:  6/17/18  Last Fill Quantity: 180,   # refills: 11  Last Office Visit: 4/2/19  Future Office visit:       Routing refill request to provider for review/approval because:  Drug not on the G, P or [facility] refill protocol or controlled substance

## 2019-05-14 ENCOUNTER — OFFICE VISIT (OUTPATIENT)
Dept: ORTHOPEDICS | Facility: CLINIC | Age: 59
End: 2019-05-14
Payer: MEDICARE

## 2019-05-14 DIAGNOSIS — L60.0 ONYCHOCRYPTOSIS: Primary | ICD-10-CM

## 2019-05-14 NOTE — LETTER
5/14/2019       RE: Erwin Aguilar  [address]  [place] [address]     Dear Colleague,    Thank you for referring your patient, Erwin Aguilar, to the [facility] at [facility]. Please see a copy of my visit note below.    Chief Complaint   Patient presents with     Follow Up     Letf hallux.        Allergies   Allergen Reactions     Lisinopril Anaphylaxis     Swollen tongue; inability to swallow; drooling; hives; swollen face, neck, angioedema     Acetaminophen      Hx of cirrhosis      Amlodipine      Swelling, hives, possible angioedema       Morphine      b/p dropped and arms went numb  Hypotension     Quinolones Hives     Bactrim [Sulfamethoxazole W/Trimethoprim] Rash     Levaquin Swelling and Rash     Swelling in lip and tongue felt thick       Subjective: Erwin is a 58 year old male who presents to the clinic today for a follow up of left hallux onychocryptosis. Relates that the toe is healing well. No s/s of infection. No drainage noted to the area. No pain to the digit.  He has been soaking and covering the digit.     Objective  Data Unavailable Data Unavailable Data Unavailable Data Unavailable Data Unavailable 0 lbs 0 oz  Left hallux nailbed is healed without drainage. No s/s of infection noted. No pain noted with palpation of the digit.     Assessment: Healed left hallux PNA.     Plan:   - Pt seen and evaluated  - No further treatment needed on the nail.   - Pt to return to clinic PRN.     Again, thank you for allowing me to participate in the care of your patient.      Sincerely,    Victor M Anaya DPM

## 2019-05-14 NOTE — NURSING NOTE
Reason For Visit:   Chief Complaint   Patient presents with     Follow Up     Tamir murray.        Pain Assessment  Patient Currently in Pain: Denies        Allergies   Allergen Reactions     Lisinopril Anaphylaxis     Swollen tongue; inability to swallow; drooling; hives; swollen face, neck, angioedema     Acetaminophen      Hx of cirrhosis      Amlodipine      Swelling, hives, possible angioedema       Morphine      b/p dropped and arms went numb  Hypotension     Quinolones Hives     Bactrim [Sulfamethoxazole W/Trimethoprim] Rash     Levaquin Swelling and Rash     Swelling in lip and tongue felt thick           Heidi Aquiles, [place]

## 2019-05-14 NOTE — PROGRESS NOTES
Chief Complaint   Patient presents with     Follow Up     Letf hallux.             Allergies   Allergen Reactions     Lisinopril Anaphylaxis     Swollen tongue; inability to swallow; drooling; hives; swollen face, neck, angioedema     Acetaminophen      Hx of cirrhosis      Amlodipine      Swelling, hives, possible angioedema       Morphine      b/p dropped and arms went numb  Hypotension     Quinolones Hives     Bactrim [Sulfamethoxazole W/Trimethoprim] Rash     Levaquin Swelling and Rash     Swelling in lip and tongue felt thick         Subjective: Erwin is a 58 year old male who presents to the clinic today for a follow up of left hallux onychocryptosis. Relates that the toe is healing well. No s/s of infection. No drainage noted to the area. No pain to the digit.  He has been soaking and covering the digit.     Objective  Data Unavailable Data Unavailable Data Unavailable Data Unavailable Data Unavailable 0 lbs 0 oz  Left hallux nailbed is healed without drainage. No s/s of infection noted. No pain noted with palpation of the digit.     Assessment: Healed left hallux PNA.     Plan:   - Pt seen and evaluated  - No further treatment needed on the nail.   - Pt to return to clinic PRN.

## 2019-05-17 DIAGNOSIS — E11.42 TYPE 2 DIABETES MELLITUS WITH DIABETIC POLYNEUROPATHY, WITH LONG-TERM CURRENT USE OF INSULIN (H): Primary | ICD-10-CM

## 2019-05-17 DIAGNOSIS — Z79.4 TYPE 2 DIABETES MELLITUS WITH DIABETIC POLYNEUROPATHY, WITH LONG-TERM CURRENT USE OF INSULIN (H): Primary | ICD-10-CM

## 2019-05-17 DIAGNOSIS — M21.42 PES PLANUS OF BOTH FEET: ICD-10-CM

## 2019-05-17 DIAGNOSIS — M21.41 PES PLANUS OF BOTH FEET: ICD-10-CM

## 2019-06-07 ENCOUNTER — E-VISIT (OUTPATIENT)
Dept: FAMILY MEDICINE | Facility: CLINIC | Age: 59
End: 2019-06-07
Payer: MEDICARE

## 2019-06-07 DIAGNOSIS — E87.6 HYPOKALEMIA: ICD-10-CM

## 2019-06-07 DIAGNOSIS — R94.31 PROLONGED QT INTERVAL: ICD-10-CM

## 2019-06-07 DIAGNOSIS — R11.0 NAUSEA: Primary | ICD-10-CM

## 2019-06-07 PROCEDURE — 99444 ZZC PHYSICIAN ONLINE EVALUATION & MANAGEMENT SERVICE: CPT | Performed by: FAMILY MEDICINE

## 2019-06-07 RX ORDER — POTASSIUM CHLORIDE 1500 MG/1
20 TABLET, EXTENDED RELEASE ORAL DAILY
Qty: 90 TABLET | Refills: 3 | Status: SHIPPED | OUTPATIENT
Start: 2019-06-07 | End: 2020-06-16

## 2019-06-07 RX ORDER — ONDANSETRON 4 MG/1
4 TABLET, FILM COATED ORAL EVERY 8 HOURS PRN
Qty: 90 TABLET | Refills: 1 | Status: SHIPPED | OUTPATIENT
Start: 2019-06-07 | End: 2019-06-24

## 2019-06-11 ENCOUNTER — MYC MEDICAL ADVICE (OUTPATIENT)
Dept: FAMILY MEDICINE | Facility: CLINIC | Age: 59
End: 2019-06-11

## 2019-06-11 NOTE — TELEPHONE ENCOUNTER
See 6/9/19 MyChart encounter.    This is duplicate.    Writer responded as per below.  FLORIN EscobarN, RN

## 2019-06-13 ENCOUNTER — OFFICE VISIT (OUTPATIENT)
Dept: OPHTHALMOLOGY | Facility: CLINIC | Age: 59
End: 2019-06-13
Payer: MEDICARE

## 2019-06-13 DIAGNOSIS — Z79.4 TYPE 2 DIABETES MELLITUS WITHOUT COMPLICATION, WITH LONG-TERM CURRENT USE OF INSULIN (H): Primary | ICD-10-CM

## 2019-06-13 DIAGNOSIS — H52.4 MYOPIA OF BOTH EYES WITH ASTIGMATISM AND PRESBYOPIA: ICD-10-CM

## 2019-06-13 DIAGNOSIS — H52.203 MYOPIA OF BOTH EYES WITH ASTIGMATISM AND PRESBYOPIA: ICD-10-CM

## 2019-06-13 DIAGNOSIS — E11.9 TYPE 2 DIABETES MELLITUS WITHOUT COMPLICATION, WITH LONG-TERM CURRENT USE OF INSULIN (H): Primary | ICD-10-CM

## 2019-06-13 DIAGNOSIS — H52.13 MYOPIA OF BOTH EYES WITH ASTIGMATISM AND PRESBYOPIA: ICD-10-CM

## 2019-06-13 ASSESSMENT — VISUAL ACUITY
OS_PH_SC: 20/25
METHOD: SNELLEN - LINEAR
OD_SC+: -2
OD_SC: 20/40
OS_PH_SC+: -1
OD_SC: J1
OS_SC: J1
OD_PH_SC: 20/20
OD_PH_SC+: -3
OS_SC: 20/40

## 2019-06-13 ASSESSMENT — REFRACTION_MANIFEST
OD_CYLINDER: +0.50
OS_AXIS: 155
OD_SPHERE: -1.25
OS_CYLINDER: +1.00
OS_SPHERE: -1.50
OD_AXIS: 023
OD_ADD: +2.00
OS_ADD: +2.00

## 2019-06-13 ASSESSMENT — CUP TO DISC RATIO
OD_RATIO: 0.30
OS_RATIO: 0.30

## 2019-06-13 ASSESSMENT — CONF VISUAL FIELD
OD_NORMAL: 1
METHOD: COUNTING FINGERS
OS_NORMAL: 1

## 2019-06-13 ASSESSMENT — TONOMETRY
IOP_METHOD: ICARE
OD_IOP_MMHG: 8
OS_IOP_MMHG: 8

## 2019-06-13 ASSESSMENT — SLIT LAMP EXAM - LIDS
COMMENTS: NORMAL
COMMENTS: NORMAL

## 2019-06-13 NOTE — NURSING NOTE
Chief Complaints and History of Present Illnesses   Patient presents with     Diabetic Eye Exam     Chief Complaint(s) and History of Present Illness(es)     Diabetic Eye Exam     Vision: is blurred for near, is blurred for distance and is worsening    Associated symptoms: blurred vision    Diabetes Type: Type 2 and on insulin    Duration: 13 years    Blood Sugars: is controlled              Comments     Pt states has not had an eye exam in 10 years. Pt has noticed more trouble with distance and near vision. No pain. No drops.  Lab Results       Component                Value               Date                       A1C                      6.8                 04/02/2019                 A1C                      7.2                 01/14/2019                 A1C                      6.1                 10/09/2018                 A1C                      6.2                 06/18/2018                 A1C                      6.4                 12/20/2017              Liyah Epstein COT 9:02 AM June 13, 2019

## 2019-06-13 NOTE — PROGRESS NOTES
A/P  1.) Type 2 Diabetes without ophthalmic manifestation OU  -Last A1c 6.7, insulin controlled  -No prev h/o retinopathy per pt  -Reviewed effects of DM on the eyes and importance of good blood sugar control  -Monitor annually with dilation    2.) Myopia/Astig/Presbyopia each eye  -Spec Rx given, previously wore them but many years ago    Monitor 1 year diabetic eye exam    I have confirmed the patient's CC, HPI and reviewed Past Medical History, Past Surgical History, Social History, Family History, Problem List, Medication List and agree with Tech note.     Autumn Ortiz, OD MAIAO NEETUS

## 2019-06-13 NOTE — LETTER
June 13, 2019       DIABETIC EYE EXAMINATION REPORT:    Wegener, Joel Daniel Irwin , MD  No address on file       Name: Erwin Aguilar   MRN: [id number]   Date of Service: 6/13/2019       Dear Dr. Wegener,    Thank you for referring your patient Erwin for diabetic eye exam. He has type 2 diabetes with a recent A1c of 6.7. He has no known history of diabetic retinopathy.     VISUAL ACUITY:    Visual Acuity (Snellen - Linear)       Right Left    Dist sc 20/40 -2 20/40    Dist ph sc 20/20 -3 20/25 -1    Near sc J1 J1           Detailed Retinal Examination:  250.01 IDDM without Eye Complications - No diabetic retinopathy either eye    Follow up recommendations: in 1 year for reevaluation.    Thank you again for the kind referral.  If I can provide you with any additional information or be of further assistance in the future, please feel free to contact me at any time.    Sincerely,    Autumn Ortiz OD FAAO  Adjunct   Dept of Ophthalmology & Visual Neuroscience  [facility]  P: [phone]  F: [phone]

## 2019-06-14 DIAGNOSIS — F41.1 GENERALIZED ANXIETY DISORDER: ICD-10-CM

## 2019-06-14 NOTE — TELEPHONE ENCOUNTER
"Requested Prescriptions   Pending Prescriptions Disp Refills     sertraline (ZOLOFT) 100 MG tablet [Pharmacy Med Name: SERTRALINE  MG TABLET]  Last Written Prescription Date:  1/4/2019  Last Fill Quantity: 180 tabs,  # refills: 1   Last office visit: 4/2/2019 with prescribing provider:  Wegener   Future Office Visit:     180 tablet 1     Sig: TAKE 2 TABLETS BY MOUTH EVERY DAY       SSRIs Protocol Passed - 6/14/2019 11:42 AM        Passed - Recent (12 mo) or future (30 days) visit within the authorizing provider's specialty     Patient had office visit in the last 12 months or has a visit in the next 30 days with authorizing provider or within the authorizing provider's specialty.  See \"Patient Info\" tab in inbasket, or \"Choose Columns\" in Meds & Orders section of the refill encounter.              Passed - Medication is active on med list        Passed - Patient is age 18 or older          "

## 2019-06-17 ENCOUNTER — MYC MEDICAL ADVICE (OUTPATIENT)
Dept: FAMILY MEDICINE | Facility: CLINIC | Age: 59
End: 2019-06-17

## 2019-06-17 DIAGNOSIS — L30.9 ECZEMA, UNSPECIFIED TYPE: ICD-10-CM

## 2019-06-17 RX ORDER — SERTRALINE HYDROCHLORIDE 100 MG/1
TABLET, FILM COATED ORAL
Qty: 180 TABLET | Refills: 0 | Status: SHIPPED | OUTPATIENT
Start: 2019-06-17 | End: 2019-09-01

## 2019-06-17 RX ORDER — TRIAMCINOLONE ACETONIDE 1 MG/G
OINTMENT TOPICAL
Qty: 30 G | Refills: 0 | Status: SHIPPED | OUTPATIENT
Start: 2019-06-17 | End: 2020-06-16

## 2019-06-17 NOTE — TELEPHONE ENCOUNTER
"Routing refill request to provider for review/approval because:  --A medical assistant discontinued sertraline on 7/11/18 for reason \"stopped by patient.\"  --RN have reordered since but needs provider to authorize this time.  --I called patient and he says he never stopped taking sertraline at this dose.    PHQ-9 SCORE 5/9/2017 9/17/2018 4/2/2019   PHQ-9 Total Score - - -   PHQ-9 Total Score 0 0 2               "

## 2019-06-17 NOTE — TELEPHONE ENCOUNTER
Covering providers-Please review and sign if agree.    Medication was discontinued from patient's medication list by a medical assistant at office visit with [facility] on 6/18/18.  No comment found that patient was not using medication at time of discontinuation.    Thank you!  SALONI Oliva BSN, RN

## 2019-06-28 ENCOUNTER — TELEPHONE (OUTPATIENT)
Dept: GASTROENTEROLOGY | Facility: CLINIC | Age: 59
End: 2019-06-28

## 2019-06-28 DIAGNOSIS — K70.30 ALCOHOLIC CIRRHOSIS OF LIVER WITHOUT ASCITES (H): Primary | ICD-10-CM

## 2019-06-28 DIAGNOSIS — Z79.4 TYPE 2 DIABETES MELLITUS WITHOUT COMPLICATION, WITH LONG-TERM CURRENT USE OF INSULIN (H): ICD-10-CM

## 2019-06-28 DIAGNOSIS — E11.9 TYPE 2 DIABETES MELLITUS WITHOUT COMPLICATION, WITH LONG-TERM CURRENT USE OF INSULIN (H): ICD-10-CM

## 2019-06-28 RX ORDER — PEN NEEDLE, DIABETIC 32GX 5/32"
NEEDLE, DISPOSABLE MISCELLANEOUS
Qty: 100 EACH | Refills: 3 | Status: SHIPPED | OUTPATIENT
Start: 2019-06-28 | End: 2020-06-16

## 2019-06-28 NOTE — TELEPHONE ENCOUNTER
Patient contacted and reminded of upcoming appointment.  Patient confirmed they will be attending.  Patient instructed to bring updated medications list to appointment.    Sophie Hernandez CMA  6/28/2019 10:49 AM

## 2019-06-28 NOTE — TELEPHONE ENCOUNTER
"Requested Prescriptions   Pending Prescriptions Disp Refills     BD ROSE U/F 32G X 4 MM insulin pen needle [Pharmacy Med Name: BD UF ROSE PEN NEEDLE [id number]]  Last Written Prescription Date:  3/25/2019  Last Fill Quantity: 30 each,  # refills: 0   Last office visit: 4/2/2019 with prescribing provider:  Wegener   Future Office Visit:      0     Sig: USE ONCE DAILY AS DIRECTED. **PLEASE SCHEDULE AN APPOINTMENT**       Diabetic Supplies Protocol Passed - 6/28/2019  9:42 AM        Passed - Medication is active on med list        Passed - Patient is 18 years of age or older        Passed - Recent (6 mo) or future (30 days) visit within the authorizing provider's specialty     Patient had office visit in the last 6 months or has a visit in the next 30 days with authorizing provider.  See \"Patient Info\" tab in inbasket, or \"Choose Columns\" in Meds & Orders section of the refill encounter.              "

## 2019-07-01 ENCOUNTER — DOCUMENTATION ONLY (OUTPATIENT)
Dept: CARE COORDINATION | Facility: CLINIC | Age: 59
End: 2019-07-01

## 2019-07-02 ENCOUNTER — ANCILLARY PROCEDURE (OUTPATIENT)
Dept: ULTRASOUND IMAGING | Facility: CLINIC | Age: 59
End: 2019-07-02
Attending: INTERNAL MEDICINE
Payer: MEDICARE

## 2019-07-02 ENCOUNTER — OFFICE VISIT (OUTPATIENT)
Dept: GASTROENTEROLOGY | Facility: CLINIC | Age: 59
End: 2019-07-02
Attending: INTERNAL MEDICINE
Payer: MEDICARE

## 2019-07-02 ENCOUNTER — MYC MEDICAL ADVICE (OUTPATIENT)
Dept: FAMILY MEDICINE | Facility: CLINIC | Age: 59
End: 2019-07-02

## 2019-07-02 VITALS
SYSTOLIC BLOOD PRESSURE: 180 MMHG | OXYGEN SATURATION: 97 % | RESPIRATION RATE: 18 BRPM | HEIGHT: 71 IN | TEMPERATURE: 98.7 F | DIASTOLIC BLOOD PRESSURE: 82 MMHG | HEART RATE: 61 BPM | WEIGHT: 216.2 LBS | BODY MASS INDEX: 30.27 KG/M2

## 2019-07-02 DIAGNOSIS — K70.30 ALCOHOLIC CIRRHOSIS OF LIVER WITHOUT ASCITES (H): ICD-10-CM

## 2019-07-02 DIAGNOSIS — R11.0 NAUSEA: Primary | ICD-10-CM

## 2019-07-02 LAB
ALBUMIN SERPL-MCNC: 4 G/DL (ref 3.4–5)
ALP SERPL-CCNC: 105 U/L (ref 40–150)
ALT SERPL W P-5'-P-CCNC: 33 U/L (ref 0–70)
ANION GAP SERPL CALCULATED.3IONS-SCNC: 5 MMOL/L (ref 3–14)
AST SERPL W P-5'-P-CCNC: 28 U/L (ref 0–45)
BILIRUB DIRECT SERPL-MCNC: 0.1 MG/DL (ref 0–0.2)
BILIRUB SERPL-MCNC: 0.5 MG/DL (ref 0.2–1.3)
BUN SERPL-MCNC: 8 MG/DL (ref 7–30)
CALCIUM SERPL-MCNC: 9 MG/DL (ref 8.5–10.1)
CHLORIDE SERPL-SCNC: 105 MMOL/L (ref 94–109)
CO2 SERPL-SCNC: 28 MMOL/L (ref 20–32)
CREAT SERPL-MCNC: 0.74 MG/DL (ref 0.66–1.25)
ERYTHROCYTE [DISTWIDTH] IN BLOOD BY AUTOMATED COUNT: 12.7 % (ref 10–15)
GFR SERPL CREATININE-BSD FRML MDRD: >90 ML/MIN/{1.73_M2}
GLUCOSE SERPL-MCNC: 133 MG/DL (ref 70–99)
HCT VFR BLD AUTO: 44.7 % (ref 40–53)
HGB BLD-MCNC: 15.1 G/DL (ref 13.3–17.7)
INR PPP: 1.14 (ref 0.86–1.14)
MCH RBC QN AUTO: 30.3 PG (ref 26.5–33)
MCHC RBC AUTO-ENTMCNC: 33.8 G/DL (ref 31.5–36.5)
MCV RBC AUTO: 90 FL (ref 78–100)
PLATELET # BLD AUTO: 191 10E9/L (ref 150–450)
POTASSIUM SERPL-SCNC: 4 MMOL/L (ref 3.4–5.3)
PROT SERPL-MCNC: 8.2 G/DL (ref 6.8–8.8)
RBC # BLD AUTO: 4.98 10E12/L (ref 4.4–5.9)
SODIUM SERPL-SCNC: 139 MMOL/L (ref 133–144)
WBC # BLD AUTO: 7.7 10E9/L (ref 4–11)

## 2019-07-02 PROCEDURE — 36415 COLL VENOUS BLD VENIPUNCTURE: CPT | Performed by: INTERNAL MEDICINE

## 2019-07-02 PROCEDURE — 85610 PROTHROMBIN TIME: CPT | Performed by: INTERNAL MEDICINE

## 2019-07-02 PROCEDURE — 80076 HEPATIC FUNCTION PANEL: CPT | Performed by: INTERNAL MEDICINE

## 2019-07-02 PROCEDURE — 85027 COMPLETE CBC AUTOMATED: CPT | Performed by: INTERNAL MEDICINE

## 2019-07-02 PROCEDURE — 80048 BASIC METABOLIC PNL TOTAL CA: CPT | Performed by: INTERNAL MEDICINE

## 2019-07-02 PROCEDURE — G0463 HOSPITAL OUTPT CLINIC VISIT: HCPCS | Mod: ZF

## 2019-07-02 ASSESSMENT — MIFFLIN-ST. JEOR: SCORE: 1817.81

## 2019-07-02 ASSESSMENT — PAIN SCALES - GENERAL: PAINLEVEL: NO PAIN (0)

## 2019-07-02 NOTE — LETTER
7/2/2019      RE: Erwin Aguilar  [address]  [address]  [place] [address]       [facility]    Hepatology follow-up    CHIEF COMPLAINT/REASON FOR THE VISIT:  Alcoholic cirrhosis.      SUBJECTIVE:  Mr. Aguilar is a 58-year-old white male whom I have seen here for alcoholic cirrhosis.  He had initially presented with hematemesis to the emergency room and he had esophageal varices.  He got banded, but since then he did abstain from alcohol and he clinically improved.  His MELD score is going down very low in the 6-7 range as I have said before in my dictations.  He has other issues also which includes cauda equina syndrome for which he had back surgery.  He had a stimulator that was there, and was taken out and he had another one placed in 06/28/2018.  He otherwise today is doing quite well, denies any abdominal pain.  He has not been in any hospital and he is on no medications.  He has chronic nausea and he takes promethazine, he has not vomited.  He has bowel movements, like once or twice a day.  He has otherwise no confusion or memory issues.  He did not have any GI bleed and his weight has remained stable.     Medical hx Surgical hx   Past Medical History:   Diagnosis Date     Actinic keratosis     aldara     Anxiety      Cancer (H)     squamous cell skin CA     Cauda equina spinal cord injury (H)      Chronic sinusitis 5-1-16     Diastasis recti      Esophageal reflux      Esophageal varices in cirrhosis (H) 4/1/2014    Hospitalized for UGI blee 3/28/14, endoscopy revealed bleeding varices.     Essential hypertension, benign      Intermittent asthma      Mild depression (H)      Mixed hyperlipidemia      Nasal polyps 5-1-16     Other chronic pain      SCCA (squamous cell carcinoma) of skin      Seasonal allergic conjunctivitis      Type II or unspecified type diabetes mellitus without mention of complication, not stated as uncontrolled      Unspecified site of spinal cord  injury without evidence of spinal bone injury     due to back surgery      Past Surgical History:   Procedure Laterality Date     BACK SURGERY  August 2009     C APPENDECTOMY  1974     COLONOSCOPY N/A 5/12/2016    Procedure: COMBINED COLONOSCOPY, SINGLE OR MULTIPLE BIOPSY/POLYPECTOMY BY BIOPSY;  Surgeon: Ana Paula Urbina MD;  Location:  GI     ENDOSCOPY UPPER, COLONOSCOPY, COMBINED  10/19/2011    Procedure:COMBINED ENDOSCOPY UPPER, COLONOSCOPY; Upper Endoscopy, Colonoscopy with Polypectomy x4; Surgeon:AMBAR RODRÍGUEZ; Location:[facility]     ENT SURGERY  1-2016    Ongoing since then     ESOPHAGOSCOPY, GASTROSCOPY, DUODENOSCOPY (EGD), COMBINED  3/28/2014    Procedure: COMBINED ESOPHAGOSCOPY, GASTROSCOPY, DUODENOSCOPY (EGD);  EGD, Gastric Biopsies, Esophageal Banding;  Surgeon: Reyna Tovar MD;  Location:  OR     ESOPHAGOSCOPY, GASTROSCOPY, DUODENOSCOPY (EGD), COMBINED  6/9/2014    Procedure: COMBINED ESOPHAGOSCOPY, GASTROSCOPY, DUODENOSCOPY (EGD);  Surgeon: Curtis Mendez MD;  Location:  GI     ESOPHAGOSCOPY, GASTROSCOPY, DUODENOSCOPY (EGD), COMBINED  7/24/2014    Procedure: COMBINED ESOPHAGOSCOPY, GASTROSCOPY, DUODENOSCOPY (EGD);  Surgeon: Gerard Samaniego MD;  Location:  OR     ESOPHAGOSCOPY, GASTROSCOPY, DUODENOSCOPY (EGD), COMBINED N/A 10/31/2014    Procedure: COMBINED ESOPHAGOSCOPY, GASTROSCOPY, DUODENOSCOPY (EGD);  Surgeon: Gerard Samaniego MD;  Location:  OR     ESOPHAGOSCOPY, GASTROSCOPY, DUODENOSCOPY (EGD), COMBINED N/A 5/12/2016    Procedure: COMBINED ESOPHAGOSCOPY, GASTROSCOPY, DUODENOSCOPY (EGD);  Surgeon: Ana Paula Urbina MD;  Location:  GI     ESOPHAGOSCOPY, GASTROSCOPY, DUODENOSCOPY (EGD), COMBINED N/A 8/2/2018    Procedure: COMBINED ESOPHAGOSCOPY, GASTROSCOPY, DUODENOSCOPY (EGD);  EGD;  Surgeon: Yu Wagner MD;  Location:  GI     HCL SQUAMOUS CELL CARCINOMA AG  10/07    left forearm     HERNIORRHAPHY UMBILICAL  11/8/2012     Procedure: HERNIORRHAPHY UMBILICAL;  Open Umbilical Hernia Repair With Mesh ;  Surgeon: Chase Nicholson MD;  [facility]: [facility]     INSERT STIMULATOR DORSAL COLUMN N/A 6/28/2018    Procedure: INSERT STIMULATOR DORSAL COLUMN;;  Surgeon: Elvis Sauceda MD;  [facility]     neuro stimulator  2010     REMOVE GENERATOR STIMULATOR (LOCATION) N/A 6/28/2018    Procedure: REMOVE GENERATOR STIMULATOR (LOCATION);  Spinal Cord Stimulator and IPG Explant and Re-Implant of SCS System (Leads and IPG);  Surgeon: Elvis Sauceda MD;  [facility]     SURGICAL HISTORY OF -   1/02    abcess tooth     SURGICAL HISTORY OF -   1999    L4-5 laminectomy, cauda equina syndrome     SURGICAL HISTORY OF -   +    herniated disk repair     TONSILLECTOMY  10 1964     TRANSPOSITION ULNAR NERVE (ELBOW)      right          Medications  Prior to Admission medications    Medication Sig Start Date End Date Taking? Authorizing Provider   albuterol (PROAIR HFA/PROVENTIL HFA/VENTOLIN HFA) 108 (90 Base) MCG/ACT Inhaler Inhale 2 puffs into the lungs every 6 hours as needed for shortness of breath / dyspnea or wheezing Ventolin or other covered brand 7/24/18  Yes Wegener, Joel Daniel Irwin, MD   ammonium lactate (LAC-HYDRIN) 12 % cream Apply topically 2 times daily as needed for dry skin 11/1/17  Yes Victor M Anaya DPM   baclofen (LIORESAL) 10 MG tablet TAKE 2 TABLETS BY MOUTH THREE TIMES DAILY 5/6/19  Yes Wegener, Joel Daniel Irwin, MD   BD ROSE U/F 32G X 4 MM insulin pen needle USE ONCE DAILY AS DIRECTED. **PLEASE SCHEDULE AN APPOINTMENT** 6/28/19  Yes Wegener, Joel Daniel Irwin, MD   blood glucose monitoring (NO BRAND SPECIFIED) test strip Use to test blood sugars 1 time daily. ACCU-CHEK KARISHMA BRAND PLEASE. 2/12/18  Yes Wegener, Joel Daniel Irwin, MD   blood glucose monitoring (NO BRAND SPECIFIED) test strip Use to test blood sugars one time daily or as directed    Accucheck Richards plus test strips  10/6/16  Yes Wegener, Joel Daniel Irwin, MD   blood glucose monitoring (SOFTCLIX) lancets Use to test blood sugar 1 time daily or as directed. 2/22/18  Yes Wegener, Joel Daniel Irwin, MD   Blood Glucose Monitoring Suppl (ACURA BLOOD GLUCOSE METER) W/DEVICE KIT 1 Dose 2 times daily 4/14/14  Yes Estrella Bowman NP   hydrochlorothiazide (HYDRODIURIL) 25 MG tablet TAKE 1 TABLET (25 MG) BY MOUTH DAILY 4/4/19  Yes Ksenia Drummond MD   insulin glargine (BASAGLAR KWIKPEN) 100 UNIT/ML pen INJECT 26 UNITS SUBCUTANEOUS DAILY 1/25/19  Yes Wegener, Joel Daniel Irwin, MD   mupirocin (BACTROBAN) 2 % ointment Apply to anterior nares BID X 2 month supply  Patient taking differently: Apply topically as needed Apply to anterior nares BID X 2 month supply 2/16/17  Yes Antonio Chávez MD   potassium chloride ER (K-TAB) 20 MEQ CR tablet Take 1 tablet (20 mEq) by mouth daily 6/7/19  Yes Wegener, Joel Daniel Irwin, MD   PROMETHAZINE HCL PO Take 1 tablet by mouth as needed (nausea)   Yes Reported, Patient   propranolol (INDERAL) 40 MG tablet Take 1 tablet (40 mg) by mouth 2 times daily 1/16/19 1/16/20 Yes Wegener, Joel Daniel Irwin, MD   rosuvastatin (CRESTOR) 20 MG tablet Take 1 tablet (20 mg) by mouth daily 1/17/19 1/17/20 Yes Wegener, Joel Daniel Irwin, MD   sertraline (ZOLOFT) 100 MG tablet TAKE 2 TABLETS BY MOUTH EVERY DAY 6/17/19  Yes Sonia Garcia MD   spironolactone (ALDACTONE) 50 MG tablet Take 1 tablet (50 mg) by mouth daily 12/21/18 12/21/19 Yes Wegener, Joel Daniel Irwin, MD   Syringe, Disposable, 25 ML MISC Syringe to be used for nasal irrigation 8/19/16  Yes Antonio Chávez MD   triamcinolone (KENALOG) 0.1 % external ointment Apply sparingly to affected area three times daily for 14 days. 6/17/19  Yes Sonia Garcia MD   ACE/ARB NOT PRESCRIBED, INTENTIONAL, ACE & ARB not prescribed due to Allergy 8/2/12   Ksenia Bean APRN CNP   STATIN NOT PRESCRIBED, INTENTIONAL, Please choose  "reason not prescribed, below 9/17/18   Wegener, Joel Daniel Irwin, MD       Allergies  Allergies   Allergen Reactions     Lisinopril Anaphylaxis     Swollen tongue; inability to swallow; drooling; hives; swollen face, neck, angioedema     Acetaminophen      Hx of cirrhosis      Amlodipine      Swelling, hives, possible angioedema       Morphine      b/p dropped and arms went numb  Hypotension     Quinolones Hives     Bactrim [Sulfamethoxazole W/Trimethoprim] Rash     Levaquin Swelling and Rash     Swelling in lip and tongue felt thick       Review of systems  A 10-point review of systems was negative    Examination  /82 (BP Location: Right arm, Patient Position: Sitting, Cuff Size: Adult Regular)   Pulse 61   Temp 98.7  F (37.1  C) (Oral)   Resp 18   Ht 1.803 m (5' 11\")   Wt 98.1 kg (216 lb 3.2 oz)   SpO2 97%   BMI 30.15 kg/m     Body mass index is 30.15 kg/m .    Gen- well, NAD, A+Ox3, normal color  Lym- no palpable LAD  CVS- RRR  RS- CTA  Abd- Not distended. No hepatosplenomegaly.  Extr- hands normal, no EVA  Skin- no rash or jaundice  Neuro- no asterixis  Psych- normal mood    Laboratory  Lab Results   Component Value Date     07/02/2019    POTASSIUM 4.0 07/02/2019    CHLORIDE 105 07/02/2019    CO2 28 07/02/2019    BUN 8 07/02/2019    CR 0.74 07/02/2019       Lab Results   Component Value Date    BILITOTAL 0.5 07/02/2019    ALT 33 07/02/2019    AST 28 07/02/2019    ALKPHOS 105 07/02/2019       Lab Results   Component Value Date    ALBUMIN 4.0 07/02/2019    PROTTOTAL 8.2 07/02/2019        Lab Results   Component Value Date    WBC 7.7 07/02/2019    HGB 15.1 07/02/2019    MCV 90 07/02/2019     07/02/2019       Lab Results   Component Value Date    INR 1.14 07/02/2019     MELD-Na score: 7 at 7/2/2019  7:15 AM  MELD score: 7 at 7/2/2019  7:15 AM  Calculated from:  Serum Creatinine: 0.74 mg/dL (Rounded to 1 mg/dL) at 7/2/2019  7:15 AM  Serum Sodium: 139 mmol/L (Rounded to 137 mmol/L) at " 7/2/2019  7:15 AM  Total Bilirubin: 0.5 mg/dL (Rounded to 1 mg/dL) at 7/2/2019  7:15 AM  INR(ratio): 1.14 at 7/2/2019  7:15 AM  Age: 58 years    Radiology    ASSESSMENT AND PLAN:  Mr. Aguilar is a 58-year-old white male who has a past history of alcohol abuse.  He initially presented with complications related with portal hypertension.  In fact, he had GI bleed attributed to esophageal varices.  These were banded and he did well.  He did not have any further bleed, but he also abstained from alcoholic beverages and his MELD score dropped very low, in fact to 7.  His MELD sodium and his MELD score are both 7.  We continued to follow him here both for surveillance for HCC and also for worsening and he is very compliant with the clinic visits and also with his medications.  We did explain that to him and he will be seen here every 6-12 months.  His main complaint is now nausea, which he is required to take antinausea medications.  He has diabetes.  He might have a motility issues, so we will get the gastric emptying studies and see if he has gastroparesis, so we will put in that order.  Otherwise, he will be seen here as I have said before every 6-12 months.      This was a 25-minute visit of which more than 50% was spent in explaining to the patient what our plan of care was.  We answered all his questions.      cc:  Primary care physician         Kimberly Ramirez MD  Hepatology  [facility]    Kimberly Ramirez MD

## 2019-07-02 NOTE — LETTER
7/2/2019       RE: Erwin Aguilar  [address]  [address]  [place] [address]     Dear Colleague,    Thank you for referring your patient, Erwin Aguilar, to the [facility]PATOLOGY at [facility]. Please see a copy of my visit note below.    [facility]    Hepatology follow-up    CHIEF COMPLAINT/REASON FOR THE VISIT:  Alcoholic cirrhosis.      SUBJECTIVE:  Mr. Aguilar is a 58-year-old white male whom I have seen here for alcoholic cirrhosis.  He had initially presented with hematemesis to the emergency room and he had esophageal varices.  He got banded, but since then he did abstain from alcohol and he clinically improved.  His MELD score is going down very low in the 6-7 range as I have said before in my dictations.  He has other issues also which includes cauda equina syndrome for which he had back surgery.  He had a stimulator that was there, and was taken out and he had another one placed in 06/28/2018.  He otherwise today is doing quite well, denies any abdominal pain.  He has not been in any hospital and he is on no medications.  He has chronic nausea and he takes promethazine, he has not vomited.  He has bowel movements, like once or twice a day.  He has otherwise no confusion or memory issues.  He did not have any GI bleed and his weight has remained stable.     Medical hx Surgical hx   Past Medical History:   Diagnosis Date     Actinic keratosis     aldara     Anxiety      Cancer (H)     squamous cell skin CA     Cauda equina spinal cord injury (H)      Chronic sinusitis 5-1-16     Diastasis recti      Esophageal reflux      Esophageal varices in cirrhosis (H) 4/1/2014    Hospitalized for UGI blee 3/28/14, endoscopy revealed bleeding varices.     Essential hypertension, benign      Intermittent asthma      Mild depression (H)      Mixed hyperlipidemia      Nasal polyps 5-1-16     Other chronic pain      SCCA (squamous cell  carcinoma) of skin      Seasonal allergic conjunctivitis      Type II or unspecified type diabetes mellitus without mention of complication, not stated as uncontrolled      Unspecified site of spinal cord injury without evidence of spinal bone injury     due to back surgery      Past Surgical History:   Procedure Laterality Date     BACK SURGERY  August 2009     C APPENDECTOMY  1974     COLONOSCOPY N/A 5/12/2016    Procedure: COMBINED COLONOSCOPY, SINGLE OR MULTIPLE BIOPSY/POLYPECTOMY BY BIOPSY;  Surgeon: Ana Paula Urbina MD;  Location:  GI     ENDOSCOPY UPPER, COLONOSCOPY, COMBINED  10/19/2011    Procedure:COMBINED ENDOSCOPY UPPER, COLONOSCOPY; Upper Endoscopy, Colonoscopy with Polypectomy x4; Surgeon:AMBAR RODRÍGUEZ; Location:[facility] OR     ENT SURGERY  1-2016    Ongoing since then     ESOPHAGOSCOPY, GASTROSCOPY, DUODENOSCOPY (EGD), COMBINED  3/28/2014    Procedure: COMBINED ESOPHAGOSCOPY, GASTROSCOPY, DUODENOSCOPY (EGD);  EGD, Gastric Biopsies, Esophageal Banding;  Surgeon: Reyna Tovar MD;  [facility]     ESOPHAGOSCOPY, GASTROSCOPY, DUODENOSCOPY (EGD), COMBINED  6/9/2014    Procedure: COMBINED ESOPHAGOSCOPY, GASTROSCOPY, DUODENOSCOPY (EGD);  Surgeon: Curtis Mendez MD;  Location:  GI     ESOPHAGOSCOPY, GASTROSCOPY, DUODENOSCOPY (EGD), COMBINED  7/24/2014    Procedure: COMBINED ESOPHAGOSCOPY, GASTROSCOPY, DUODENOSCOPY (EGD);  Surgeon: Gerard Samaniego MD;  Location:  OR     ESOPHAGOSCOPY, GASTROSCOPY, DUODENOSCOPY (EGD), COMBINED N/A 10/31/2014    Procedure: COMBINED ESOPHAGOSCOPY, GASTROSCOPY, DUODENOSCOPY (EGD);  Surgeon: Gerard Samaniego MD;  [facility]     ESOPHAGOSCOPY, GASTROSCOPY, DUODENOSCOPY (EGD), COMBINED N/A 5/12/2016    Procedure: COMBINED ESOPHAGOSCOPY, GASTROSCOPY, DUODENOSCOPY (EGD);  Surgeon: Ana Paula Urbina MD;  Location:  GI     ESOPHAGOSCOPY, GASTROSCOPY, DUODENOSCOPY (EGD), COMBINED N/A 8/2/2018    Procedure: COMBINED ESOPHAGOSCOPY,  GASTROSCOPY, DUODENOSCOPY (EGD);  EGD;  Surgeon: Yu Wagner MD;  [facility]     HCL SQUAMOUS CELL CARCINOMA AG  10/07    left forearm     HERNIORRHAPHY UMBILICAL  11/8/2012    Procedure: HERNIORRHAPHY UMBILICAL;  Open Umbilical Hernia Repair With Mesh ;  Surgeon: Chase Nicholson MD;  [facility]     INSERT STIMULATOR DORSAL COLUMN N/A 6/28/2018    Procedure: INSERT STIMULATOR DORSAL COLUMN;;  Surgeon: Elvis Sauceda MD;  [facility]     neuro stimulator  2010     REMOVE GENERATOR STIMULATOR (LOCATION) N/A 6/28/2018    Procedure: REMOVE GENERATOR STIMULATOR (LOCATION);  Spinal Cord Stimulator and IPG Explant and Re-Implant of SCS System (Leads and IPG);  Surgeon: Elvis Sauceda MD;  [facility]     SURGICAL HISTORY OF -   1/02    abcess tooth     SURGICAL HISTORY OF -   1999    L4-5 laminectomy, cauda equina syndrome     SURGICAL HISTORY OF -   +    herniated disk repair     TONSILLECTOMY  10 1964     TRANSPOSITION ULNAR NERVE (ELBOW)      right          Medications  Prior to Admission medications    Medication Sig Start Date End Date Taking? Authorizing Provider   albuterol (PROAIR HFA/PROVENTIL HFA/VENTOLIN HFA) 108 (90 Base) MCG/ACT Inhaler Inhale 2 puffs into the lungs every 6 hours as needed for shortness of breath / dyspnea or wheezing Ventolin or other covered brand 7/24/18  Yes Wegener, Joel Daniel Irwin, MD   ammonium lactate (LAC-HYDRIN) 12 % cream Apply topically 2 times daily as needed for dry skin 11/1/17  Yes Victor M Anaya DPM   baclofen (LIORESAL) 10 MG tablet TAKE 2 TABLETS BY MOUTH THREE TIMES DAILY 5/6/19  Yes Wegener, Joel Daniel Irwin, MD   BD ROSE U/F 32G X 4 MM insulin pen needle USE ONCE DAILY AS DIRECTED. **PLEASE SCHEDULE AN APPOINTMENT** 6/28/19  Yes Wegener, Joel Daniel Irwin, MD   blood glucose monitoring (NO BRAND SPECIFIED) test strip Use to test blood sugars 1 time daily. ACCU-CHEK KARISHMA BRAND  PLEASE. 2/12/18  Yes Wegener, Joel Daniel Irwin, MD   blood glucose monitoring (NO BRAND SPECIFIED) test strip Use to test blood sugars one time daily or as directed    Accucheck Richards plus test strips 10/6/16  Yes Wegener, Joel Daniel Irwin, MD   blood glucose monitoring (SOFTCLIX) lancets Use to test blood sugar 1 time daily or as directed. 2/22/18  Yes Wegener, Joel Daniel Irwin, MD   Blood Glucose Monitoring Suppl (ACURA BLOOD GLUCOSE METER) W/DEVICE KIT 1 Dose 2 times daily 4/14/14  Yes Estrella Bowman NP   hydrochlorothiazide (HYDRODIURIL) 25 MG tablet TAKE 1 TABLET (25 MG) BY MOUTH DAILY 4/4/19  Yes Ksenia Drummond MD   insulin glargine (BASAGLAR KWIKPEN) 100 UNIT/ML pen INJECT 26 UNITS SUBCUTANEOUS DAILY 1/25/19  Yes Wegener, Joel Daniel Irwin, MD   mupirocin (BACTROBAN) 2 % ointment Apply to anterior nares BID X 2 month supply  Patient taking differently: Apply topically as needed Apply to anterior nares BID X 2 month supply 2/16/17  Yes Antonio Chávez MD   potassium chloride ER (K-TAB) 20 MEQ CR tablet Take 1 tablet (20 mEq) by mouth daily 6/7/19  Yes Wegener, Joel Daniel Irwin, MD   PROMETHAZINE HCL PO Take 1 tablet by mouth as needed (nausea)   Yes Reported, Patient   propranolol (INDERAL) 40 MG tablet Take 1 tablet (40 mg) by mouth 2 times daily 1/16/19 1/16/20 Yes Wegener, Joel Daniel Irwin, MD   rosuvastatin (CRESTOR) 20 MG tablet Take 1 tablet (20 mg) by mouth daily 1/17/19 1/17/20 Yes Wegener, Joel Daniel Irwin, MD   sertraline (ZOLOFT) 100 MG tablet TAKE 2 TABLETS BY MOUTH EVERY DAY 6/17/19  Yes Sonia Garcia MD   spironolactone (ALDACTONE) 50 MG tablet Take 1 tablet (50 mg) by mouth daily 12/21/18 12/21/19 Yes Wegener, Joel Daniel Irwin, MD   Syringe, Disposable, 25 ML MISC Syringe to be used for nasal irrigation 8/19/16  Yes Antonio Chávez MD   triamcinolone (KENALOG) 0.1 % external ointment Apply sparingly to affected area three times daily for 14 days. 6/17/19  " Yes Sonia Garcia MD   ACE/ARB NOT PRESCRIBED, INTENTIONAL, ACE & ARB not prescribed due to Allergy 8/2/12   Ksenia Bean APRN CNP   STATIN NOT PRESCRIBED, INTENTIONAL, Please choose reason not prescribed, below 9/17/18   Wegener, Joel Daniel Irwin, MD       Allergies  Allergies   Allergen Reactions     Lisinopril Anaphylaxis     Swollen tongue; inability to swallow; drooling; hives; swollen face, neck, angioedema     Acetaminophen      Hx of cirrhosis      Amlodipine      Swelling, hives, possible angioedema       Morphine      b/p dropped and arms went numb  Hypotension     Quinolones Hives     Bactrim [Sulfamethoxazole W/Trimethoprim] Rash     Levaquin Swelling and Rash     Swelling in lip and tongue felt thick       Review of systems  A 10-point review of systems was negative    Examination  /82 (BP Location: Right arm, Patient Position: Sitting, Cuff Size: Adult Regular)   Pulse 61   Temp 98.7  F (37.1  C) (Oral)   Resp 18   Ht 1.803 m (5' 11\")   Wt 98.1 kg (216 lb 3.2 oz)   SpO2 97%   BMI 30.15 kg/m     Body mass index is 30.15 kg/m .    Gen- well, NAD, A+Ox3, normal color  Lym- no palpable LAD  CVS- RRR  RS- CTA  Abd- Not distended. No hepatosplenomegaly.  Extr- hands normal, no EVA  Skin- no rash or jaundice  Neuro- no asterixis  Psych- normal mood    Laboratory  Lab Results   Component Value Date     07/02/2019    POTASSIUM 4.0 07/02/2019    CHLORIDE 105 07/02/2019    CO2 28 07/02/2019    BUN 8 07/02/2019    CR 0.74 07/02/2019       Lab Results   Component Value Date    BILITOTAL 0.5 07/02/2019    ALT 33 07/02/2019    AST 28 07/02/2019    ALKPHOS 105 07/02/2019       Lab Results   Component Value Date    ALBUMIN 4.0 07/02/2019    PROTTOTAL 8.2 07/02/2019        Lab Results   Component Value Date    WBC 7.7 07/02/2019    HGB 15.1 07/02/2019    MCV 90 07/02/2019     07/02/2019       Lab Results   Component Value Date    INR 1.14 07/02/2019     MELD-Na score: 7 at " 7/2/2019  7:15 AM  MELD score: 7 at 7/2/2019  7:15 AM  Calculated from:  Serum Creatinine: 0.74 mg/dL (Rounded to 1 mg/dL) at 7/2/2019  7:15 AM  Serum Sodium: 139 mmol/L (Rounded to 137 mmol/L) at 7/2/2019  7:15 AM  Total Bilirubin: 0.5 mg/dL (Rounded to 1 mg/dL) at 7/2/2019  7:15 AM  INR(ratio): 1.14 at 7/2/2019  7:15 AM  Age: 58 years    Radiology    ASSESSMENT AND PLAN:  Mr. Aguilar is a 58-year-old white male who has a past history of alcohol abuse.  He initially presented with complications related with portal hypertension.  In fact, he had GI bleed attributed to esophageal varices.  These were banded and he did well.  He did not have any further bleed, but he also abstained from alcoholic beverages and his MELD score dropped very low, in fact to 7.  His MELD sodium and his MELD score are both 7.  We continued to follow him here both for surveillance for HCC and also for worsening and he is very compliant with the clinic visits and also with his medications.  We did explain that to him and he will be seen here every 6-12 months.  His main complaint is now nausea, which he is required to take antinausea medications.  He has diabetes.  He might have a motility issues, so we will get the gastric emptying studies and see if he has gastroparesis, so we will put in that order.  Otherwise, he will be seen here as I have said before every 6-12 months.      This was a 25-minute visit of which more than 50% was spent in explaining to the patient what our plan of care was.  We answered all his questions.      cc:  Primary care physician         Kimberly Ramirez MD  Hepatology  [facility]    Again, thank you for allowing me to participate in the care of your patient.      Sincerely,    Kimberly Ramirez MD

## 2019-07-02 NOTE — NURSING NOTE
"Chief Complaint   Patient presents with     RECHECK     alcohol induced cirrhosis       Vital signs:  Temp: 98.7  F (37.1  C) Temp src: Oral BP: 180/82 Pulse: 61   Resp: 18 SpO2: 97 %     Height: 180.3 cm (5' 11\") Weight: 98.1 kg (216 lb 3.2 oz)  Estimated body mass index is 30.15 kg/m  as calculated from the following:    Height as of this encounter: 1.803 m (5' 11\").    Weight as of this encounter: 98.1 kg (216 lb 3.2 oz).          Mildred Zhao [facility]  7/2/2019 7:55 AM      "

## 2019-07-02 NOTE — PROGRESS NOTES
[facility]    Hepatology follow-up    CHIEF COMPLAINT/REASON FOR THE VISIT:  Alcoholic cirrhosis.      SUBJECTIVE:  Mr. Aguilar is a 58-year-old white male whom I have seen here for alcoholic cirrhosis.  He had initially presented with hematemesis to the emergency room and he had esophageal varices.  He got banded, but since then he did abstain from alcohol and he clinically improved.  His MELD score is going down very low in the 6-7 range as I have said before in my dictations.  He has other issues also which includes cauda equina syndrome for which he had back surgery.  He had a stimulator that was there, and was taken out and he had another one placed in 06/28/2018.  He otherwise today is doing quite well, denies any abdominal pain.  He has not been in any hospital and he is on no medications.  He has chronic nausea and he takes promethazine, he has not vomited.  He has bowel movements, like once or twice a day.  He has otherwise no confusion or memory issues.  He did not have any GI bleed and his weight has remained stable.     Medical hx Surgical hx   Past Medical History:   Diagnosis Date     Actinic keratosis     aldara     Anxiety      Cancer (H)     squamous cell skin CA     Cauda equina spinal cord injury (H)      Chronic sinusitis 5-1-16     Diastasis recti      Esophageal reflux      Esophageal varices in cirrhosis (H) 4/1/2014    Hospitalized for UGI blee 3/28/14, endoscopy revealed bleeding varices.     Essential hypertension, benign      Intermittent asthma      Mild depression (H)      Mixed hyperlipidemia      Nasal polyps 5-1-16     Other chronic pain      SCCA (squamous cell carcinoma) of skin      Seasonal allergic conjunctivitis      Type II or unspecified type diabetes mellitus without mention of complication, not stated as uncontrolled      Unspecified site of spinal cord injury without evidence of spinal bone injury     due to back surgery      Past Surgical  History:   Procedure Laterality Date     BACK SURGERY  August 2009     C APPENDECTOMY  1974     COLONOSCOPY N/A 5/12/2016    Procedure: COMBINED COLONOSCOPY, SINGLE OR MULTIPLE BIOPSY/POLYPECTOMY BY BIOPSY;  Surgeon: Ana Paula Urbina MD;  Location:  GI     ENDOSCOPY UPPER, COLONOSCOPY, COMBINED  10/19/2011    Procedure:COMBINED ENDOSCOPY UPPER, COLONOSCOPY; Upper Endoscopy, Colonoscopy with Polypectomy x4; Surgeon:AMBAR RODRÍGUEZ; Location:[facility] OR     ENT SURGERY  1-2016    Ongoing since then     ESOPHAGOSCOPY, GASTROSCOPY, DUODENOSCOPY (EGD), COMBINED  3/28/2014    Procedure: COMBINED ESOPHAGOSCOPY, GASTROSCOPY, DUODENOSCOPY (EGD);  EGD, Gastric Biopsies, Esophageal Banding;  Surgeon: Reyna Tovar MD;  Location:  OR     ESOPHAGOSCOPY, GASTROSCOPY, DUODENOSCOPY (EGD), COMBINED  6/9/2014    Procedure: COMBINED ESOPHAGOSCOPY, GASTROSCOPY, DUODENOSCOPY (EGD);  Surgeon: Curtis Mendez MD;  Location:  GI     ESOPHAGOSCOPY, GASTROSCOPY, DUODENOSCOPY (EGD), COMBINED  7/24/2014    Procedure: COMBINED ESOPHAGOSCOPY, GASTROSCOPY, DUODENOSCOPY (EGD);  Surgeon: Gerard Samaniego MD;  Location:  OR     ESOPHAGOSCOPY, GASTROSCOPY, DUODENOSCOPY (EGD), COMBINED N/A 10/31/2014    Procedure: COMBINED ESOPHAGOSCOPY, GASTROSCOPY, DUODENOSCOPY (EGD);  Surgeon: Gerard Samaniego MD;  Location:  OR     ESOPHAGOSCOPY, GASTROSCOPY, DUODENOSCOPY (EGD), COMBINED N/A 5/12/2016    Procedure: COMBINED ESOPHAGOSCOPY, GASTROSCOPY, DUODENOSCOPY (EGD);  Surgeon: Ana Paula Urbina MD;  Location:  GI     ESOPHAGOSCOPY, GASTROSCOPY, DUODENOSCOPY (EGD), COMBINED N/A 8/2/2018    Procedure: COMBINED ESOPHAGOSCOPY, GASTROSCOPY, DUODENOSCOPY (EGD);  EGD;  Surgeon: Yu Wagner MD;  Location:  GI     HCL SQUAMOUS CELL CARCINOMA AG  10/07    left forearm     HERNIORRHAPHY UMBILICAL  11/8/2012    Procedure: HERNIORRHAPHY UMBILICAL;  Open Umbilical Hernia Repair With Mesh ;  Surgeon: Lyn  Chase Lundberg MD;  Location: [facility] OR     INSERT STIMULATOR DORSAL COLUMN N/A 6/28/2018    Procedure: INSERT STIMULATOR DORSAL COLUMN;;  Surgeon: Elvis Sauceda MD;  Location: [facility] OR     neuro stimulator  2010     REMOVE GENERATOR STIMULATOR (LOCATION) N/A 6/28/2018    Procedure: REMOVE GENERATOR STIMULATOR (LOCATION);  Spinal Cord Stimulator and IPG Explant and Re-Implant of SCS System (Leads and IPG);  Surgeon: Elvis Sauceda MD;  Location:  OR     SURGICAL HISTORY OF -   1/02    abcess tooth     SURGICAL HISTORY OF -   1999    L4-5 laminectomy, cauda equina syndrome     SURGICAL HISTORY OF -   +    herniated disk repair     TONSILLECTOMY  10 1964     TRANSPOSITION ULNAR NERVE (ELBOW)      right          Medications  Prior to Admission medications    Medication Sig Start Date End Date Taking? Authorizing Provider   albuterol (PROAIR HFA/PROVENTIL HFA/VENTOLIN HFA) 108 (90 Base) MCG/ACT Inhaler Inhale 2 puffs into the lungs every 6 hours as needed for shortness of breath / dyspnea or wheezing Ventolin or other covered brand 7/24/18  Yes Wegener, Joel Daniel Irwin, MD   ammonium lactate (LAC-HYDRIN) 12 % cream Apply topically 2 times daily as needed for dry skin 11/1/17  Yes Victor M Anaya DPM   baclofen (LIORESAL) 10 MG tablet TAKE 2 TABLETS BY MOUTH THREE TIMES DAILY 5/6/19  Yes Wegener, Joel Daniel Irwin, MD   BD ROSE U/F 32G X 4 MM insulin pen needle USE ONCE DAILY AS DIRECTED. **PLEASE SCHEDULE AN APPOINTMENT** 6/28/19  Yes Wegener, Joel Daniel Irwin, MD   blood glucose monitoring (NO BRAND SPECIFIED) test strip Use to test blood sugars 1 time daily. ACCU-CHEK KARISHMA BRAND PLEASE. 2/12/18  Yes Wegener, Joel Daniel Irwin, MD   blood glucose monitoring (NO BRAND SPECIFIED) test strip Use to test blood sugars one time daily or as directed    Accucheck Richards plus test strips 10/6/16  Yes Wegener, Joel Daniel Irwin, MD   blood glucose monitoring (SOFTCLIX) lancets Use to  test blood sugar 1 time daily or as directed. 2/22/18  Yes Wegener, Joel Daniel Irwin, MD   Blood Glucose Monitoring Suppl (ACURA BLOOD GLUCOSE METER) W/DEVICE KIT 1 Dose 2 times daily 4/14/14  Yes Estrella Bowman NP   hydrochlorothiazide (HYDRODIURIL) 25 MG tablet TAKE 1 TABLET (25 MG) BY MOUTH DAILY 4/4/19  Yes Ksenia Drummond MD   insulin glargine (BASAGLAR KWIKPEN) 100 UNIT/ML pen INJECT 26 UNITS SUBCUTANEOUS DAILY 1/25/19  Yes Wegener, Joel Daniel Irwin, MD   mupirocin (BACTROBAN) 2 % ointment Apply to anterior nares BID X 2 month supply  Patient taking differently: Apply topically as needed Apply to anterior nares BID X 2 month supply 2/16/17  Yes Antonio Chávez MD   potassium chloride ER (K-TAB) 20 MEQ CR tablet Take 1 tablet (20 mEq) by mouth daily 6/7/19  Yes Wegener, Joel Daniel Irwin, MD   PROMETHAZINE HCL PO Take 1 tablet by mouth as needed (nausea)   Yes Reported, Patient   propranolol (INDERAL) 40 MG tablet Take 1 tablet (40 mg) by mouth 2 times daily 1/16/19 1/16/20 Yes Wegener, Joel Daniel Irwin, MD   rosuvastatin (CRESTOR) 20 MG tablet Take 1 tablet (20 mg) by mouth daily 1/17/19 1/17/20 Yes Wegener, Joel Daniel Irwin, MD   sertraline (ZOLOFT) 100 MG tablet TAKE 2 TABLETS BY MOUTH EVERY DAY 6/17/19  Yes Sonia Garcia MD   spironolactone (ALDACTONE) 50 MG tablet Take 1 tablet (50 mg) by mouth daily 12/21/18 12/21/19 Yes Wegener, Joel Daniel Irwin, MD   Syringe, Disposable, 25 ML MISC Syringe to be used for nasal irrigation 8/19/16  Yes Antonio Chávez MD   triamcinolone (KENALOG) 0.1 % external ointment Apply sparingly to affected area three times daily for 14 days. 6/17/19  Yes Sonia Garcia MD   ACE/ARB NOT PRESCRIBED, INTENTIONAL, ACE & ARB not prescribed due to Allergy 8/2/12   Ksenia Bean APRN CNP   STATIN NOT PRESCRIBED, INTENTIONAL, Please choose reason not prescribed, below 9/17/18   Wegener, Joel Daniel Irwin, MD       Allergies  Allergies  "  Allergen Reactions     Lisinopril Anaphylaxis     Swollen tongue; inability to swallow; drooling; hives; swollen face, neck, angioedema     Acetaminophen      Hx of cirrhosis      Amlodipine      Swelling, hives, possible angioedema       Morphine      b/p dropped and arms went numb  Hypotension     Quinolones Hives     Bactrim [Sulfamethoxazole W/Trimethoprim] Rash     Levaquin Swelling and Rash     Swelling in lip and tongue felt thick       Review of systems  A 10-point review of systems was negative    Examination  /82 (BP Location: Right arm, Patient Position: Sitting, Cuff Size: Adult Regular)   Pulse 61   Temp 98.7  F (37.1  C) (Oral)   Resp 18   Ht 1.803 m (5' 11\")   Wt 98.1 kg (216 lb 3.2 oz)   SpO2 97%   BMI 30.15 kg/m    Body mass index is 30.15 kg/m .    Gen- well, NAD, A+Ox3, normal color  Lym- no palpable LAD  CVS- RRR  RS- CTA  Abd- Not distended. No hepatosplenomegaly.  Extr- hands normal, no EVA  Skin- no rash or jaundice  Neuro- no asterixis  Psych- normal mood    Laboratory  Lab Results   Component Value Date     07/02/2019    POTASSIUM 4.0 07/02/2019    CHLORIDE 105 07/02/2019    CO2 28 07/02/2019    BUN 8 07/02/2019    CR 0.74 07/02/2019       Lab Results   Component Value Date    BILITOTAL 0.5 07/02/2019    ALT 33 07/02/2019    AST 28 07/02/2019    ALKPHOS 105 07/02/2019       Lab Results   Component Value Date    ALBUMIN 4.0 07/02/2019    PROTTOTAL 8.2 07/02/2019        Lab Results   Component Value Date    WBC 7.7 07/02/2019    HGB 15.1 07/02/2019    MCV 90 07/02/2019     07/02/2019       Lab Results   Component Value Date    INR 1.14 07/02/2019     MELD-Na score: 7 at 7/2/2019  7:15 AM  MELD score: 7 at 7/2/2019  7:15 AM  Calculated from:  Serum Creatinine: 0.74 mg/dL (Rounded to 1 mg/dL) at 7/2/2019  7:15 AM  Serum Sodium: 139 mmol/L (Rounded to 137 mmol/L) at 7/2/2019  7:15 AM  Total Bilirubin: 0.5 mg/dL (Rounded to 1 mg/dL) at 7/2/2019  7:15 AM  INR(ratio): " 1.14 at 7/2/2019  7:15 AM  Age: 58 years    Radiology    ASSESSMENT AND PLAN:  Mr. Aguilar is a 58-year-old white male who has a past history of alcohol abuse.  He initially presented with complications related with portal hypertension.  In fact, he had GI bleed attributed to esophageal varices.  These were banded and he did well.  He did not have any further bleed, but he also abstained from alcoholic beverages and his MELD score dropped very low, in fact to 7.  His MELD sodium and his MELD score are both 7.  We continued to follow him here both for surveillance for HCC and also for worsening and he is very compliant with the clinic visits and also with his medications.  We did explain that to him and he will be seen here every 6-12 months.  His main complaint is now nausea, which he is required to take antinausea medications.  He has diabetes.  He might have a motility issues, so we will get the gastric emptying studies and see if he has gastroparesis, so we will put in that order.  Otherwise, he will be seen here as I have said before every 6-12 months.      This was a 25-minute visit of which more than 50% was spent in explaining to the patient what our plan of care was.  We answered all his questions.      cc:  Primary care physician         Kimberly Ramirez MD  Hepatology  [facility]

## 2019-07-18 ENCOUNTER — TELEPHONE (OUTPATIENT)
Dept: GASTROENTEROLOGY | Facility: CLINIC | Age: 59
End: 2019-07-18

## 2019-07-23 DIAGNOSIS — I10 HYPERTENSION GOAL BP (BLOOD PRESSURE) < 140/90: ICD-10-CM

## 2019-07-23 RX ORDER — HYDROCHLOROTHIAZIDE 25 MG/1
TABLET ORAL
Qty: 0.1 TABLET | Refills: 0 | OUTPATIENT
Start: 2019-07-23

## 2019-07-23 NOTE — TELEPHONE ENCOUNTER
Refused Prescriptions:                       Disp   Refills    hydrochlorothiazide (HYDRODIURIL) 25 MG ta*0.1 ta*0        Sig: TAKE 1 TABLET (25 MG) BY MOUTH DAILY  Refused By: BRENDAN ARRIAZA  Reason for Refusal: Duplicate

## 2019-07-23 NOTE — TELEPHONE ENCOUNTER
"Requested Prescriptions   Pending Prescriptions Disp Refills     hydrochlorothiazide (HYDRODIURIL) 25 MG tablet [Pharmacy Med Name: HYDROCHLOROTHIAZIDE 25 MG TAB] 90 tablet 3     Sig: TAKE 1 TABLET (25 MG) BY MOUTH DAILY  Last Written Prescription Date:  4/4/2019  Last Fill Quantity: 90 tablet,  # refills: 3   Last Office Visit: 4/2/2019   Future Office Visit:            Diuretics (Including Combos) Protocol Failed - 7/23/2019 10:34 AM        Failed - Blood pressure under 140/90 in past 12 months     BP Readings from Last 3 Encounters:   07/02/19 180/82   04/02/19 136/81   01/14/19 157/82           Passed - Recent (12 mo) or future (30 days) visit within the authorizing provider's specialty     Patient had office visit in the last 12 months or has a visit in the next 30 days with authorizing provider or within the authorizing provider's specialty.  See \"Patient Info\" tab in inbasket, or \"Choose Columns\" in Meds & Orders section of the refill encounter.            Passed - Medication is active on med list        Passed - Patient is age 18 or older        Passed - Normal serum creatinine on file in past 12 months     Recent Labs   Lab Test 07/02/19  0715   CR 0.74            Passed - Normal serum potassium on file in past 12 months     Recent Labs   Lab Test 07/02/19  0715   POTASSIUM 4.0            Passed - Normal serum sodium on file in past 12 months     Recent Labs   Lab Test 07/02/19  0715                   "

## 2019-08-01 ENCOUNTER — TELEPHONE (OUTPATIENT)
Dept: FAMILY MEDICINE | Facility: CLINIC | Age: 59
End: 2019-08-01

## 2019-08-01 NOTE — TELEPHONE ENCOUNTER
Reason for Call:  Other call back    Detailed comments: pt states should get in to see provider asap per Dr, Wegener, no openings on schedule , pt thought appointment should be with Wegener . Please advise    Phone Number Patient can be reached at: Home number on file [phone] (home)    Best Time: asap    Can we leave a detailed message on this number? YES    Call taken on 8/1/2019 at 10:02 AM by Ana Christina

## 2019-08-25 DIAGNOSIS — J45.40 MODERATE PERSISTENT ASTHMA WITHOUT COMPLICATION: ICD-10-CM

## 2019-08-26 NOTE — TELEPHONE ENCOUNTER
"Requested Prescriptions   Pending Prescriptions Disp Refills     VENTOLIN  (90 Base) MCG/ACT inhaler [Pharmacy Med Name: VENTOLIN HFA 90 MCG INHALER] 18 Inhaler 11     Sig: INHALE 2 PUFFS BY MOUTH EVERY 6 HOURS AS NEEDED FOR SHORTNESS OF BREATH/DYSPNEA OR WHEEZING  Last Written Prescription Date:  7/24/2018  Last Fill Quantity: 3inhailer,  # refills: 3   Last Office Visit: 4/2/2019 Wegener  Future Office Visit:            Asthma Maintenance Inhalers - Anticholinergics Failed - 8/25/2019  9:26 AM        Failed - Asthma control assessment score within normal limits in last 6 months     Please review ACT score.   ACT Total Scores 1/19/2016 10/10/2016 12/21/2018   ACT TOTAL SCORE - - -   ASTHMA ER VISITS - - -   ASTHMA HOSPITALIZATIONS - - -   ACT TOTAL SCORE (Goal Greater than or Equal to 20) 21 17 20   In the past 12 months, how many times did you visit the emergency room for your asthma without being admitted to the hospital? 0 0 0   In the past 12 months, how many times were you hospitalized overnight because of your asthma? 0 0 0             Passed - Patient is age 12 years or older        Passed - Medication is active on med list        Passed - Recent (6 mo) or future (30 days) visit within the authorizing provider's specialty     Patient had office visit in the last 6 months or has a visit in the next 30 days with authorizing provider or within the authorizing provider's specialty.  See \"Patient Info\" tab in inbasket, or \"Choose Columns\" in Meds & Orders section of the refill encounter.              "

## 2019-08-27 RX ORDER — ALBUTEROL SULFATE 90 UG/1
AEROSOL, METERED RESPIRATORY (INHALATION)
Qty: 18 INHALER | Refills: 0 | Status: SHIPPED | OUTPATIENT
Start: 2019-08-27 | End: 2019-09-19

## 2019-08-27 NOTE — TELEPHONE ENCOUNTER
MA-Please contact patient to update ACT.    Prescription approved per [facility].    Warnings Override History for albuterol (PROAIR HFA/PROVENTIL HFA/VENTOLIN HFA) 108 (90 Base) MCG/ACT Inhaler [[id number]]     Overridden by Wegener, Joel Daniel Irwin, MD on Aug 2, 2019 5:11 PM   Drug-Drug   1. BETA-ADRENERGIC BLOCKERS / SYMPATHOMIMETICS [Level: Major] [Reason: Tolerated medication/side effects in past]   Other Orders: propranolol (INDERAL) 40 MG tablet          SALONI Oliva, FLORINN, RN

## 2019-09-01 DIAGNOSIS — F41.1 GENERALIZED ANXIETY DISORDER: ICD-10-CM

## 2019-09-02 NOTE — TELEPHONE ENCOUNTER
"Requested Prescriptions   Pending Prescriptions Disp Refills     sertraline (ZOLOFT) 100 MG tablet [Pharmacy Med Name: SERTRALINE   Last Written Prescription Date:  6/17/19  Last Fill Quantity: 180,  # refills: 0   Last office visit: 4/2/2019 with prescribing provider:     Future Office Visit:     MG TABLET] 180 tablet 0     Sig: TAKE 2 TABLETS BY MOUTH EVERY DAY       SSRIs Protocol Passed - 9/1/2019  9:10 AM        Passed - Recent (12 mo) or future (30 days) visit within the authorizing provider's specialty     Patient had office visit in the last 12 months or has a visit in the next 30 days with authorizing provider or within the authorizing provider's specialty.  See \"Patient Info\" tab in inbasket, or \"Choose Columns\" in Meds & Orders section of the refill encounter.            Passed - Medication is active on med list        Passed - Patient is age 18 or older          "

## 2019-09-03 DIAGNOSIS — E11.42 TYPE 2 DIABETES MELLITUS WITH DIABETIC POLYNEUROPATHY, WITH LONG-TERM CURRENT USE OF INSULIN (H): ICD-10-CM

## 2019-09-03 DIAGNOSIS — Z79.4 TYPE 2 DIABETES MELLITUS WITH DIABETIC POLYNEUROPATHY, WITH LONG-TERM CURRENT USE OF INSULIN (H): ICD-10-CM

## 2019-09-03 NOTE — TELEPHONE ENCOUNTER
"Requested Prescriptions   Pending Prescriptions Disp Refills     blood glucose monitoring (NO BRAND SPECIFIED) test strip 100 strip 3     Sig: USE TO TEST BLOOD SUGARS ONCE DAILY  Last Written Prescription Date:  2/12/2018  Last Fill Quantity: 100,  # refills: 3   Last Office Visit: 4/2/2019   Future Office Visit:            Diabetic Supplies Protocol Passed - 9/3/2019 10:13 AM        Passed - Medication is active on med list        Passed - Patient is 18 years of age or older        Passed - Recent (6 mo) or future (30 days) visit within the authorizing provider's specialty     Patient had office visit in the last 6 months or has a visit in the next 30 days with authorizing provider.  See \"Patient Info\" tab in inbasket, or \"Choose Columns\" in Meds & Orders section of the refill encounter.              "

## 2019-09-04 RX ORDER — SERTRALINE HYDROCHLORIDE 100 MG/1
TABLET, FILM COATED ORAL
Qty: 180 TABLET | Refills: 0 | Status: SHIPPED | OUTPATIENT
Start: 2019-09-04 | End: 2019-10-08

## 2019-09-05 NOTE — TELEPHONE ENCOUNTER
Panel Management Review      Patient has the following on his problem list:     Asthma review     ACT Total Scores 12/21/2018   ACT TOTAL SCORE -   ASTHMA ER VISITS -   ASTHMA HOSPITALIZATIONS -   ACT TOTAL SCORE (Goal Greater than or Equal to 20) 20   In the past 12 months, how many times did you visit the emergency room for your asthma without being admitted to the hospital? 0   In the past 12 months, how many times were you hospitalized overnight because of your asthma? 0      1. Is Asthma diagnosis on the Problem List? Yes    2. Is Asthma listed on Health Maintenance? Yes    3. Patient is due for:  ACT      Composite cancer screening  Chart review shows that this patient is due/due soon for the following None  Summary:    Patient is due/failing the following:   ACT and PHYSICAL    Action needed:   Patient needs office visit for physical. and Patient needs to do ACT.    Type of outreach:    Phone, spoke to patient.  ACT completed, pt will call back to schedule physical at a later time.    Questions for provider review:    None                                                                                                                                    Dariana Costa, [facility]       Chart routed to Care Team .

## 2019-09-06 ASSESSMENT — ASTHMA QUESTIONNAIRES: ACT_TOTALSCORE: 22

## 2019-09-19 DIAGNOSIS — J45.40 MODERATE PERSISTENT ASTHMA WITHOUT COMPLICATION: ICD-10-CM

## 2019-09-21 RX ORDER — ALBUTEROL SULFATE 90 UG/1
AEROSOL, METERED RESPIRATORY (INHALATION)
Qty: 18 INHALER | Refills: 0 | Status: SHIPPED | OUTPATIENT
Start: 2019-09-21 | End: 2020-04-17

## 2019-09-21 NOTE — TELEPHONE ENCOUNTER
Medication is being filled for 1 time refill only due to:  Patient needs to be seen because needs DM recheck in Oct.  Pt notified on my chart to schedule DM recheck in Oct.  Soraya Molina RN

## 2019-09-28 ENCOUNTER — HEALTH MAINTENANCE LETTER (OUTPATIENT)
Age: 59
End: 2019-09-28

## 2019-10-08 ENCOUNTER — OFFICE VISIT (OUTPATIENT)
Dept: FAMILY MEDICINE | Facility: CLINIC | Age: 59
End: 2019-10-08
Payer: MEDICARE

## 2019-10-08 VITALS
HEIGHT: 71 IN | RESPIRATION RATE: 18 BRPM | HEART RATE: 50 BPM | OXYGEN SATURATION: 99 % | TEMPERATURE: 98.2 F | WEIGHT: 223 LBS | DIASTOLIC BLOOD PRESSURE: 80 MMHG | SYSTOLIC BLOOD PRESSURE: 130 MMHG | BODY MASS INDEX: 31.22 KG/M2

## 2019-10-08 DIAGNOSIS — Z79.4 TYPE 2 DIABETES MELLITUS WITH DIABETIC POLYNEUROPATHY, WITH LONG-TERM CURRENT USE OF INSULIN (H): Primary | ICD-10-CM

## 2019-10-08 DIAGNOSIS — F41.1 GENERALIZED ANXIETY DISORDER: ICD-10-CM

## 2019-10-08 DIAGNOSIS — Z96.89 S/P INSERTION OF SPINAL CORD STIMULATOR: ICD-10-CM

## 2019-10-08 DIAGNOSIS — F10.21 ALCOHOLISM IN REMISSION (H): ICD-10-CM

## 2019-10-08 DIAGNOSIS — Z23 NEED FOR DIPHTHERIA-TETANUS-PERTUSSIS (TDAP) VACCINE: ICD-10-CM

## 2019-10-08 DIAGNOSIS — I10 HYPERTENSION GOAL BP (BLOOD PRESSURE) < 140/90: ICD-10-CM

## 2019-10-08 DIAGNOSIS — Z51.81 ENCOUNTER FOR THERAPEUTIC DRUG MONITORING: ICD-10-CM

## 2019-10-08 DIAGNOSIS — K31.84 GASTROPARESIS: ICD-10-CM

## 2019-10-08 DIAGNOSIS — Z23 NEED FOR PROPHYLACTIC VACCINATION AND INOCULATION AGAINST INFLUENZA: ICD-10-CM

## 2019-10-08 DIAGNOSIS — E11.42 TYPE 2 DIABETES MELLITUS WITH DIABETIC POLYNEUROPATHY, WITH LONG-TERM CURRENT USE OF INSULIN (H): Primary | ICD-10-CM

## 2019-10-08 DIAGNOSIS — M47.816 OSTEOARTHRITIS OF LUMBAR SPINE, UNSPECIFIED SPINAL OSTEOARTHRITIS COMPLICATION STATUS: ICD-10-CM

## 2019-10-08 DIAGNOSIS — G89.4 CHRONIC PAIN SYNDROME: ICD-10-CM

## 2019-10-08 LAB
CREAT UR-MCNC: 60 MG/DL
HBA1C MFR BLD: 6.4 % (ref 0–5.6)
MICROALBUMIN UR-MCNC: <5 MG/L
MICROALBUMIN/CREAT UR: NORMAL MG/G CR (ref 0–17)

## 2019-10-08 PROCEDURE — 90471 IMMUNIZATION ADMIN: CPT | Performed by: FAMILY MEDICINE

## 2019-10-08 PROCEDURE — G0008 ADMIN INFLUENZA VIRUS VAC: HCPCS | Mod: 59 | Performed by: FAMILY MEDICINE

## 2019-10-08 PROCEDURE — 90715 TDAP VACCINE 7 YRS/> IM: CPT | Performed by: FAMILY MEDICINE

## 2019-10-08 PROCEDURE — 36415 COLL VENOUS BLD VENIPUNCTURE: CPT | Performed by: FAMILY MEDICINE

## 2019-10-08 PROCEDURE — 99207 C FOOT EXAM  NO CHARGE: CPT | Performed by: FAMILY MEDICINE

## 2019-10-08 PROCEDURE — 82043 UR ALBUMIN QUANTITATIVE: CPT | Performed by: FAMILY MEDICINE

## 2019-10-08 PROCEDURE — 90682 RIV4 VACC RECOMBINANT DNA IM: CPT | Performed by: FAMILY MEDICINE

## 2019-10-08 PROCEDURE — 83036 HEMOGLOBIN GLYCOSYLATED A1C: CPT | Performed by: FAMILY MEDICINE

## 2019-10-08 PROCEDURE — 99214 OFFICE O/P EST MOD 30 MIN: CPT | Mod: 25 | Performed by: FAMILY MEDICINE

## 2019-10-08 RX ORDER — SERTRALINE HYDROCHLORIDE 100 MG/1
TABLET, FILM COATED ORAL
Qty: 180 TABLET | Refills: 3 | Status: SHIPPED | OUTPATIENT
Start: 2019-10-08 | End: 2021-09-28

## 2019-10-08 ASSESSMENT — MIFFLIN-ST. JEOR: SCORE: 1848.65

## 2019-10-08 NOTE — NURSING NOTE
Prior to immunization administration, verified patients identity using patient s name and date of birth. Please see Immunization Activity for additional information.     Screening Questionnaire for Adult Immunization    Are you sick today?   No   Do you have allergies to medications, food, a vaccine component or latex?   No   Have you ever had a serious reaction after receiving a vaccination?   No   Do you have a long-term health problem with heart disease, lung disease, asthma, kidney disease, metabolic disease (e.g. diabetes), anemia, or other blood disorder?   Yes   Do you have cancer, leukemia, HIV/AIDS, or any other immune system problem?   No   In the past 3 months, have you taken medications that affect  your immune system, such as prednisone, other steroids, or anticancer drugs; drugs for the treatment of rheumatoid arthritis, Crohn s disease, or psoriasis; or have you had radiation treatments?   No   Have you had a seizure, or a brain or other nervous system problem?   No   During the past year, have you received a transfusion of blood or blood     products, or been given immune (gamma) globulin or antiviral drug?   No   For women: Are you pregnant or is there a chance you could become        pregnant during the next month?   No   Have you received any vaccinations in the past 4 weeks?   No     Immunization questionnaire was positive for at least one answer.  Notified Wegener.        Per orders of Dr. Wegener injection of Adacel given by Rhoda Ramirez CMA. Patient instructed to remain in clinic for 15 minutes afterwards, and to report any adverse reaction to me immediately.       Screening performed by Rhoda Ramirez CMA on 10/8/2019 at 2:50 PM.

## 2019-10-08 NOTE — PROGRESS NOTES
Subjective     Erwin Aguilar is a 58 year old male who presents to clinic today for the following health issues:    HPI   Diabetes Follow-up      How often are you checking your blood sugar? Not at all    What time of day are you checking your blood sugars (select all that apply)?      Have you had any blood sugars above 200?  No    Have you had any blood sugars below 70?  No    What symptoms do you notice when your blood sugar is low?  None    What concerns do you have today about your diabetes? None     Do you have any of these symptoms? (Select all that apply)  Numbness in feet     Have you had a diabetic eye exam in the last 12 months? Yes- Date of last eye exam: 06/13/2019    BP Readings from Last 2 Encounters:   07/02/19 180/82   04/02/19 136/81     Hemoglobin A1C (%)   Date Value   04/02/2019 6.8 (H)   01/14/2019 7.2 (H)     LDL Cholesterol Calculated (mg/dL)   Date Value   04/02/2019 147 (H)   01/14/2019 120 (H)       Diabetes Management Resources      How many servings of fruits and vegetables do you eat daily?  2-3    On average, how many sweetened beverages do you drink each day (soda, juice, sweet tea, etc)?   2    How many days per week do you miss taking your medication? 0         Type 2 diabetes mellitus with diabetic polyneuropathy, with long-term current use of insulin (H)  Generalized anxiety disorder  Encounter for therapeutic drug monitoring :increased stress due to girlfriend with neurodegenerative disorder.  Otherwise feels health is good.     Sober x 6 years now!  Energy improving.  Still working with dr. page for gastroparesis.     Spinal stimulator for chronic back pain working decently.     Continued numbness in feet.     No longer taking spironolactone.         Problem list, Medication list, Allergies, and Medical/Social/Surgical histories reviewed in [facility] and updated as appropriate.  Labs reviewed in [facility]  BP Readings from Last 3 Encounters:   10/08/19 130/80   07/02/19 180/82    04/02/19 136/81    Wt Readings from Last 3 Encounters:   10/08/19 101.2 kg (223 lb)   07/02/19 98.1 kg (216 lb 3.2 oz)   04/02/19 100.5 kg (221 lb 8 oz)                  Patient Active Problem List   Diagnosis     Generalized anxiety disorder     Type 2 diabetes, HbA1c goal < 7% (H)     Hyperlipidemia LDL goal <100     Hypertension goal BP (blood pressure) < 140/90     Major depressive disorder, recurrent episode, mild (H)     Chronic pain syndrome     GERD (gastroesophageal reflux disease)     Abnormal liver function tests     Health Care Home     Abnormal EMG     Hernia     Prolonged QT interval     Diastasis recti     Hypokalemia     Cauda equina syndrome with neurogenic bladder (H)     Wound infection     Cellulitis     Nausea without vomiting     Atopic dermatitis     Muscle spasms of Upper extremity     Edema of left lower extremity     Esophageal varices in cirrhosis (H)     Cirrhosis of liver (H)     Anemia due to blood loss, acute     Skin infection     Superficial thrombophlebitis of arm     Cellulitis of hand     Ganglion cyst     Dry skin dermatitis     Moderate persistent asthma     Open wound of right heel     Fall     Closed fracture of right patella     Right knee pain     Alcoholic cirrhosis of liver without ascites (H)     Lumbosacral radiculopathy     Calculus of gallbladder without cholecystitis without obstruction     Type 2 diabetes mellitus without complication, with long-term current use of insulin (H)     Wound, open, buttock, right     Type 2 diabetes mellitus with diabetic polyneuropathy, with long-term current use of insulin (H)     Benign neoplasm of skin of trunk, except scrotum     Hematemesis with nausea     Osteoarthritis of lumbar spine, unspecified spinal osteoarthritis complication status     S/P insertion of spinal cord stimulator     Past Surgical History:   Procedure Laterality Date     BACK SURGERY  August 2009     C APPENDECTOMY  1974     COLONOSCOPY N/A 5/12/2016     Procedure: COMBINED COLONOSCOPY, SINGLE OR MULTIPLE BIOPSY/POLYPECTOMY BY BIOPSY;  Surgeon: Ana Paula Urbina MD;  Location:  GI     ENDOSCOPY UPPER, COLONOSCOPY, COMBINED  10/19/2011    Procedure:COMBINED ENDOSCOPY UPPER, COLONOSCOPY; Upper Endoscopy, Colonoscopy with Polypectomy x4; Surgeon:AMBAR RODRÍGUEZ; Location: OR     ENT SURGERY  1-2016    Ongoing since then     ESOPHAGOSCOPY, GASTROSCOPY, DUODENOSCOPY (EGD), COMBINED  3/28/2014    Procedure: COMBINED ESOPHAGOSCOPY, GASTROSCOPY, DUODENOSCOPY (EGD);  EGD, Gastric Biopsies, Esophageal Banding;  Surgeon: Reyna Tovar MD;  Location:  OR     ESOPHAGOSCOPY, GASTROSCOPY, DUODENOSCOPY (EGD), COMBINED  6/9/2014    Procedure: COMBINED ESOPHAGOSCOPY, GASTROSCOPY, DUODENOSCOPY (EGD);  Surgeon: Curtis Mendez MD;  Location:  GI     ESOPHAGOSCOPY, GASTROSCOPY, DUODENOSCOPY (EGD), COMBINED  7/24/2014    Procedure: COMBINED ESOPHAGOSCOPY, GASTROSCOPY, DUODENOSCOPY (EGD);  Surgeon: Gerard Samaniego MD;  Location:  OR     ESOPHAGOSCOPY, GASTROSCOPY, DUODENOSCOPY (EGD), COMBINED N/A 10/31/2014    Procedure: COMBINED ESOPHAGOSCOPY, GASTROSCOPY, DUODENOSCOPY (EGD);  Surgeon: Gerard Samaniego MD;  Location:  OR     ESOPHAGOSCOPY, GASTROSCOPY, DUODENOSCOPY (EGD), COMBINED N/A 5/12/2016    Procedure: COMBINED ESOPHAGOSCOPY, GASTROSCOPY, DUODENOSCOPY (EGD);  Surgeon: Ana Paula Urbina MD;  Location:  GI     ESOPHAGOSCOPY, GASTROSCOPY, DUODENOSCOPY (EGD), COMBINED N/A 8/2/2018    Procedure: COMBINED ESOPHAGOSCOPY, GASTROSCOPY, DUODENOSCOPY (EGD);  EGD;  Surgeon: Yu Wagner MD;  Location:  GI     HCL SQUAMOUS CELL CARCINOMA AG  10/07    left forearm     HERNIORRHAPHY UMBILICAL  11/8/2012    Procedure: HERNIORRHAPHY UMBILICAL;  Open Umbilical Hernia Repair With Mesh ;  Surgeon: Chase Nicholson MD;  Location: [facility] OR     INSERT STIMULATOR DORSAL COLUMN N/A 6/28/2018    Procedure: INSERT STIMULATOR DORSAL  COLUMN;;  Surgeon: Elvis Sauceda MD;  Location: [facility] OR     neuro stimulator       REMOVE GENERATOR STIMULATOR (LOCATION) N/A 2018    Procedure: REMOVE GENERATOR STIMULATOR (LOCATION);  Spinal Cord Stimulator and IPG Explant and Re-Implant of SCS System (Leads and IPG);  Surgeon: Elvis Sauceda MD;  Location: [facility] OR     SURGICAL HISTORY OF -       abcess tooth     SURGICAL HISTORY OF -       L4-5 laminectomy, cauda equina syndrome     SURGICAL HISTORY OF -   +    herniated disk repair     TONSILLECTOMY  10 1964     TRANSPOSITION ULNAR NERVE (ELBOW)      right       Social History     Tobacco Use     Smoking status: Former Smoker     Packs/day: 0.50     Years: 1.00     Pack years: 0.50     Types: Cigarettes     Start date: 10/13/1983     Last attempt to quit: 1984     Years since quittin.3     Smokeless tobacco: Never Used   Substance Use Topics     Alcohol use: No     Alcohol/week: 0.0 standard drinks     Comment: a pint of alcohol / day - last drink was 3/28/14     Family History   Problem Relation Age of Onset     Cancer Mother         lung     Breast Cancer Mother      Other Cancer Mother      Cancer Father         esophogeal, GERD     Diabetes Brother         amputation, Type 1     Diabetes Brother      Diabetes Brother      Cancer - colorectal No family hx of          Current Outpatient Medications   Medication Sig Dispense Refill     ACE/ARB NOT PRESCRIBED, INTENTIONAL, ACE & ARB not prescribed due to Allergy       albuterol (PROAIR HFA/PROVENTIL HFA/VENTOLIN HFA) 108 (90 Base) MCG/ACT inhaler INHALE 2 PUFFS BY MOUTH EVERY 6 HOURS AS NEEDED FOR SHORTNESS OF BREATH/DYSPNEA OR WHEEZING 18 Inhaler 0     ammonium lactate (LAC-HYDRIN) 12 % cream Apply topically 2 times daily as needed for dry skin 385 g 3     baclofen (LIORESAL) 10 MG tablet TAKE 2 TABLETS BY MOUTH THREE TIMES DAILY 180 tablet 11     BD ROSE U/F 32G X 4 MM insulin pen needle USE ONCE  DAILY AS DIRECTED. **PLEASE SCHEDULE AN APPOINTMENT** 100 each 3     blood glucose (ACCU-CHEK KARISHMA PLUS) test strip USE TO TEST BLOOD SUGARS ONCE DAILY 100 strip 1     hydrochlorothiazide (HYDRODIURIL) 25 MG tablet TAKE 1 TABLET (25 MG) BY MOUTH DAILY 90 tablet 3     insulin glargine (BASAGLAR KWIKPEN) 100 UNIT/ML pen INJECT 26 UNITS SUBCUTANEOUS DAILY 3 mL 11     mupirocin (BACTROBAN) 2 % ointment Apply to anterior nares BID X 2 month supply (Patient taking differently: Apply topically as needed Apply to anterior nares BID X 2 month supply) 2 g 3     potassium chloride ER (K-TAB) 20 MEQ CR tablet Take 1 tablet (20 mEq) by mouth daily 90 tablet 3     PROMETHAZINE HCL PO Take 1 tablet by mouth as needed (nausea)       propranolol (INDERAL) 40 MG tablet Take 2 tablets (80 mg) by mouth 2 times daily 360 tablet 3     rosuvastatin (CRESTOR) 20 MG tablet Take 1 tablet (20 mg) by mouth daily 90 tablet 3     sertraline (ZOLOFT) 100 MG tablet TAKE 2 TABLETS BY MOUTH EVERY  tablet 3     STATIN NOT PRESCRIBED, INTENTIONAL, Please choose reason not prescribed, below       triamcinolone (KENALOG) 0.1 % external ointment Apply sparingly to affected area three times daily for 14 days. 30 g 0     blood glucose monitoring (NO BRAND SPECIFIED) test strip Use to test blood sugars one time daily or as directed    Accucheck Richards plus test strips (Patient not taking: Reported on 10/8/2019) 1 Box 5     blood glucose monitoring (SOFTCLIX) lancets Use to test blood sugar 1 time daily or as directed. (Patient not taking: Reported on 10/8/2019) 100 each 3     Syringe, Disposable, 25 ML MISC Syringe to be used for nasal irrigation (Patient not taking: Reported on 10/8/2019) 1 each 3     Allergies   Allergen Reactions     Lisinopril Anaphylaxis     Swollen tongue; inability to swallow; drooling; hives; swollen face, neck, angioedema     Acetaminophen      Hx of cirrhosis      Amlodipine      Swelling, hives, possible angioedema        "Morphine      b/p dropped and arms went numb  Hypotension     Quinolones Hives     Bactrim [Sulfamethoxazole W/Trimethoprim] Rash     Levaquin Swelling and Rash     Swelling in lip and tongue felt thick     Recent Labs   Lab Test 10/08/19  0842 07/02/19  0715 04/02/19  0927 01/14/19  0903 10/09/18  0923  10/05/15  0923   A1C 6.4*  --  6.8* 7.2* 6.1*   < >  --    LDL  --   --  147* 120* 170*   < >  --    HDL  --   --  41 38* 44   < >  --    TRIG  --   --  132 108 221*   < >  --    ALT  --  33 32 40  --    < > 24   CR  --  0.74 0.74 0.66  --    < > 0.78   GFRESTIMATED  --  >90 >90 >90  --    < > >90  Non  GFR Calc     GFRESTBLACK  --  >90 >90 >90  --    < > >90   GFR Calc     POTASSIUM  --  4.0 3.4 3.4  --    < > 3.3*   TSH  --   --   --   --  1.36  --  1.08    < > = values in this interval not displayed.        ROS:  Constitutional, HEENT, cardiovascular, pulmonary, GI, , musculoskeletal, neuro, skin, endocrine and psych systems are negative, except as otherwise noted.        OBJECTIVE:  /80 (BP Location: Left arm, Patient Position: Sitting, Cuff Size: Adult Regular)   Pulse 50   Temp 98.2  F (36.8  C) (Oral)   Resp 18   Ht 1.803 m (5' 11\")   Wt 101.2 kg (223 lb)   SpO2 99%   BMI 31.10 kg/m      EXAM:  GENERAL APPEARANCE: healthy, alert and no distress  Dp/pt pulses palpable. No  Hair on top of feet. 1+ edema.   No sensation to monofilament to upper tibia.     No open sores on feet.     Stigmata of toenail fungus.     Callous lateral great toes and heels.     ASSESSMENT AND PLAN  1. Type 2 diabetes mellitus with diabetic polyneuropathy, with long-term current use of insulin (H)  Controlled.      Continue medication as in medication list, appropriate.  No ace due to previous intolerance.     Discussed doing daily foot exams (he is) due to neuropathy.     Flu and tdap vaccines today.     Follow up April for yearly physical, lipids, diabetes check.     - Hemoglobin A1c; " "Future  - Hemoglobin A1c  - Albumin Random Urine Quantitative with Creat Ratio  - FOOT EXAM    2. GENERALIZED ANXIETY DIS  About due for refills     Due for ekg for qt monitoring however has spinal stimulator running today and did not bring control to turn off.  Will follow up for nurse visit to have this done.     - sertraline (ZOLOFT) 100 MG tablet; TAKE 2 TABLETS BY MOUTH EVERY DAY  Dispense: 180 tablet; Refill: 3    3. Encounter for therapeutic drug monitoring  As above.   - EKG 12-lead complete w/read - Clinics; Future    4. Hypertension goal BP (blood pressure) < 140/90  At goal, no changes.     5. Gastroparesis  Continues to work with dr. Ramirez. No changes from my perspective.     6. Chronic pain syndrome  Overall doing decently with spinal stimulator - no longer using/needing opiods.       7. Osteoarthritis of lumbar spine, unspecified spinal osteoarthritis complication status      8. S/P insertion of spinal cord stimulator      9. Alcoholism in remission (H)  Keep up the great work!            MYCHART FOR ON-LINE CARE(VISITS), LABS, REFILLS, MESSAGING, ETC [email or web address] , [phone]    E-VISIT: click \"on-line care, then request e-visit\".  E-visits work well for following up on issues we have discussed in clinic previously which may need new prescriptions, new prescriptions or substantial discussion. These are always done by me (Dr. Wegener).     ONCARE VISIT:  [email or web address]  - we treat nearly 50 common conditions through on-care.  These are done in an hour by on-call staff.     RADIOLOGY:  [facility]:  [phone]   [facility]: [phone]    Mammogram and Colonoscopy [phone]    Smoking Cessation: www.[email or web address].org, 1-983-609-PLAN ([phone])      CONSUMER PRICE LINE for estimates of test costs:  [phone]         Joel Wegener, MD        "

## 2019-10-09 DIAGNOSIS — E78.5 HYPERLIPIDEMIA LDL GOAL <100: ICD-10-CM

## 2019-10-09 DIAGNOSIS — Z79.4 TYPE 2 DIABETES MELLITUS WITH DIABETIC POLYNEUROPATHY, WITH LONG-TERM CURRENT USE OF INSULIN (H): ICD-10-CM

## 2019-10-09 DIAGNOSIS — E11.42 TYPE 2 DIABETES MELLITUS WITH DIABETIC POLYNEUROPATHY, WITH LONG-TERM CURRENT USE OF INSULIN (H): ICD-10-CM

## 2019-10-13 RX ORDER — ROSUVASTATIN CALCIUM 10 MG/1
TABLET, COATED ORAL
Qty: 0.1 TABLET | Refills: 0 | OUTPATIENT
Start: 2019-10-13

## 2019-10-20 DIAGNOSIS — E11.42 TYPE 2 DIABETES MELLITUS WITH DIABETIC POLYNEUROPATHY, WITH LONG-TERM CURRENT USE OF INSULIN (H): ICD-10-CM

## 2019-10-20 DIAGNOSIS — Z79.4 TYPE 2 DIABETES MELLITUS WITH DIABETIC POLYNEUROPATHY, WITH LONG-TERM CURRENT USE OF INSULIN (H): ICD-10-CM

## 2019-10-20 DIAGNOSIS — E78.5 HYPERLIPIDEMIA LDL GOAL <100: ICD-10-CM

## 2019-10-20 RX ORDER — ROSUVASTATIN CALCIUM 10 MG/1
TABLET, COATED ORAL
Qty: 0.1 TABLET | Refills: 0 | OUTPATIENT
Start: 2019-10-20

## 2019-10-20 NOTE — TELEPHONE ENCOUNTER
"Requested Prescriptions   Pending Prescriptions Disp Refills     rosuvastatin (CRESTOR) 10 MG tablet [Pharmacy Med Name: ROSUVASTATIN CALCIUM 10 MG TAB] 90 tablet 3     Sig: TAKE 1 TABLET BY MOUTH EVERY DAY       Statins Protocol Passed - 10/20/2019 10:41 AM        Passed - LDL on file in past 12 months     Recent Labs   Lab Test 04/02/19  0927   *             Passed - No abnormal creatine kinase in past 12 months     No lab results found.             Passed - Recent (12 mo) or future (30 days) visit within the authorizing provider's specialty     Patient has had an office visit with the authorizing provider or a provider within the authorizing providers department within the previous 12 mos or has a future within next 30 days. See \"Patient Info\" tab in inbasket, or \"Choose Columns\" in Meds & Orders section of the refill encounter.              Passed - Medication is active on med list        Passed - Patient is age 18 or older        Refused Prescriptions:                       Disp   Refills    rosuvastatin (CRESTOR) 10 MG tablet [Pharm*0.1 ta*0        Sig: TAKE 1 TABLET BY MOUTH EVERY DAY  Refused By: BRENDAN ARRIAZA  Reason for Refusal: Duplicate      "

## 2019-10-27 ENCOUNTER — HEALTH MAINTENANCE LETTER (OUTPATIENT)
Age: 59
End: 2019-10-27

## 2019-11-07 DIAGNOSIS — E78.5 HYPERLIPIDEMIA LDL GOAL <100: ICD-10-CM

## 2019-11-07 DIAGNOSIS — Z79.4 TYPE 2 DIABETES MELLITUS WITH DIABETIC POLYNEUROPATHY, WITH LONG-TERM CURRENT USE OF INSULIN (H): ICD-10-CM

## 2019-11-07 DIAGNOSIS — E11.42 TYPE 2 DIABETES MELLITUS WITH DIABETIC POLYNEUROPATHY, WITH LONG-TERM CURRENT USE OF INSULIN (H): ICD-10-CM

## 2019-11-07 NOTE — TELEPHONE ENCOUNTER
"Requested Prescriptions   Pending Prescriptions Disp Refills     rosuvastatin (CRESTOR) 10 MG tablet [Pharmacy Med Name: ROSUVASTATIN CALCIUM 10 MG TAB] 90 tablet 3     Sig: TAKE 1 TABLET BY MOUTH EVERY DAY  Last Written Prescription Date:  1/17/2019  Last Fill Quantity: 90 tablet,  # refills: 3   Last Office Visit: 10/8/2019   Future Office Visit:            Statins Protocol Passed - 11/7/2019  7:30 AM        Passed - LDL on file in past 12 months     Recent Labs   Lab Test 04/02/19  0927   *           Passed - No abnormal creatine kinase in past 12 months     No lab results found.           Passed - Recent (12 mo) or future (30 days) visit within the authorizing provider's specialty     Patient has had an office visit with the authorizing provider or a provider within the authorizing providers department within the previous 12 mos or has a future within next 30 days. See \"Patient Info\" tab in inbasket, or \"Choose Columns\" in Meds & Orders section of the refill encounter.            Passed - Medication is active on med list        Passed - Patient is age 18 or older          "

## 2019-11-09 RX ORDER — ROSUVASTATIN CALCIUM 10 MG/1
TABLET, COATED ORAL
Qty: 90 TABLET | Refills: 3 | OUTPATIENT
Start: 2019-11-09

## 2019-11-09 NOTE — TELEPHONE ENCOUNTER
Pharmacy requesting old/incorrect dose (10 mg) .    Message sent to pharmacy - Refusal reason: Incorrect Dose (SEE ORDER SENT 1/17/19.).  Brown VAUGHN

## 2019-12-18 DIAGNOSIS — R11.0 NAUSEA WITHOUT VOMITING: ICD-10-CM

## 2019-12-18 NOTE — TELEPHONE ENCOUNTER
"Requested Prescriptions   Pending Prescriptions Disp Refills     promethazine (PHENERGAN) 25 MG tablet [Pharmacy Med Name: PROMETHAZINE 25 MG TABLET] 120 tablet 11     Sig: TAKE 1 TABLET BY MOUTH EVERY 6 HOURS AS NEEDED FOR NAUSEA  Historical  Last Office Visit: 10/8/2019   Future Office Visit:             Antivertigo/Antiemetic Agents Passed - 12/18/2019 10:02 AM        Passed - Recent (12 mo) or future (30 days) visit within the authorizing provider's specialty     Patient has had an office visit with the authorizing provider or a provider within the authorizing providers department within the previous 12 mos or has a future within next 30 days. See \"Patient Info\" tab in inbasket, or \"Choose Columns\" in Meds & Orders section of the refill encounter.            Passed - Medication is active on med list        Passed - Patient is 18 years of age or older          "

## 2019-12-23 RX ORDER — PROMETHAZINE HYDROCHLORIDE 25 MG/1
TABLET ORAL
Qty: 0.1 TABLET | Refills: 0 | OUTPATIENT
Start: 2019-12-23

## 2019-12-24 NOTE — TELEPHONE ENCOUNTER
Refused Prescriptions:                       Disp   Refills    promethazine (PHENERGAN) 25 MG tablet [Pha*0.1 ta*0        Sig: TAKE 1 TABLET BY MOUTH EVERY 6 HOURS AS NEEDED FOR           NAUSEA  Refused By: BRENDAN ARRIAZA  Reason for Refusal: Duplicate

## 2020-01-06 DIAGNOSIS — K70.30 ALCOHOLIC CIRRHOSIS OF LIVER WITHOUT ASCITES (H): Primary | ICD-10-CM

## 2020-01-20 ENCOUNTER — PRE VISIT (OUTPATIENT)
Dept: GASTROENTEROLOGY | Facility: CLINIC | Age: 60
End: 2020-01-20

## 2020-01-20 NOTE — PROGRESS NOTES
Was the patient contacted by phone and reminded of the upcoming visit? Yes    Was the patient instructed to bring a current list of all medications to the appointment or instructed to bring in all medication bottles? Yes, patient verbalized understanding    Was the patient instructed to arrive prior to the appointment time to have ordered labs drawn? Yes, patient verbalized understanding    Were the needed lab orders placed? Yes    Was lab appointment made 1 hour or more prior to visit? Yes    Patient instructed to arrive early for check-in    Mildred Zhao CMA [facility]  1/20/2020 9:02 AM

## 2020-01-21 ENCOUNTER — OFFICE VISIT (OUTPATIENT)
Dept: GASTROENTEROLOGY | Facility: CLINIC | Age: 60
End: 2020-01-21
Attending: INTERNAL MEDICINE
Payer: MEDICARE

## 2020-01-21 VITALS
DIASTOLIC BLOOD PRESSURE: 78 MMHG | BODY MASS INDEX: 31.74 KG/M2 | SYSTOLIC BLOOD PRESSURE: 174 MMHG | HEIGHT: 71 IN | WEIGHT: 226.7 LBS | HEART RATE: 57 BPM | OXYGEN SATURATION: 98 %

## 2020-01-21 DIAGNOSIS — K70.30 ALCOHOLIC CIRRHOSIS OF LIVER WITHOUT ASCITES (H): ICD-10-CM

## 2020-01-21 DIAGNOSIS — K70.30 ALCOHOLIC CIRRHOSIS OF LIVER WITHOUT ASCITES (H): Primary | ICD-10-CM

## 2020-01-21 LAB
ALBUMIN SERPL-MCNC: 4 G/DL (ref 3.4–5)
ALP SERPL-CCNC: 107 U/L (ref 40–150)
ALT SERPL W P-5'-P-CCNC: 31 U/L (ref 0–70)
ANION GAP SERPL CALCULATED.3IONS-SCNC: 3 MMOL/L (ref 3–14)
AST SERPL W P-5'-P-CCNC: 14 U/L (ref 0–45)
BILIRUB DIRECT SERPL-MCNC: 0.2 MG/DL (ref 0–0.2)
BILIRUB SERPL-MCNC: 0.7 MG/DL (ref 0.2–1.3)
BUN SERPL-MCNC: 10 MG/DL (ref 7–30)
CALCIUM SERPL-MCNC: 9.2 MG/DL (ref 8.5–10.1)
CHLORIDE SERPL-SCNC: 104 MMOL/L (ref 94–109)
CO2 SERPL-SCNC: 31 MMOL/L (ref 20–32)
CREAT SERPL-MCNC: 0.75 MG/DL (ref 0.66–1.25)
ERYTHROCYTE [DISTWIDTH] IN BLOOD BY AUTOMATED COUNT: 12.5 % (ref 10–15)
GFR SERPL CREATININE-BSD FRML MDRD: >90 ML/MIN/{1.73_M2}
GLUCOSE SERPL-MCNC: 124 MG/DL (ref 70–99)
HCT VFR BLD AUTO: 44.7 % (ref 40–53)
HGB BLD-MCNC: 15 G/DL (ref 13.3–17.7)
INR PPP: 1.12 (ref 0.86–1.14)
MCH RBC QN AUTO: 30.4 PG (ref 26.5–33)
MCHC RBC AUTO-ENTMCNC: 33.6 G/DL (ref 31.5–36.5)
MCV RBC AUTO: 91 FL (ref 78–100)
PLATELET # BLD AUTO: 177 10E9/L (ref 150–450)
POTASSIUM SERPL-SCNC: 3.6 MMOL/L (ref 3.4–5.3)
PROT SERPL-MCNC: 8 G/DL (ref 6.8–8.8)
RBC # BLD AUTO: 4.94 10E12/L (ref 4.4–5.9)
SODIUM SERPL-SCNC: 138 MMOL/L (ref 133–144)
WBC # BLD AUTO: 8 10E9/L (ref 4–11)

## 2020-01-21 PROCEDURE — G0463 HOSPITAL OUTPT CLINIC VISIT: HCPCS | Mod: ZF

## 2020-01-21 PROCEDURE — 85610 PROTHROMBIN TIME: CPT | Performed by: INTERNAL MEDICINE

## 2020-01-21 PROCEDURE — 80076 HEPATIC FUNCTION PANEL: CPT | Performed by: INTERNAL MEDICINE

## 2020-01-21 PROCEDURE — 91200 LIVER ELASTOGRAPHY: CPT | Mod: ZF

## 2020-01-21 PROCEDURE — 36415 COLL VENOUS BLD VENIPUNCTURE: CPT | Performed by: INTERNAL MEDICINE

## 2020-01-21 PROCEDURE — 80048 BASIC METABOLIC PNL TOTAL CA: CPT | Performed by: INTERNAL MEDICINE

## 2020-01-21 PROCEDURE — 85027 COMPLETE CBC AUTOMATED: CPT | Performed by: INTERNAL MEDICINE

## 2020-01-21 ASSESSMENT — MIFFLIN-ST. JEOR: SCORE: 1860.43

## 2020-01-21 ASSESSMENT — PAIN SCALES - GENERAL: PAINLEVEL: NO PAIN (0)

## 2020-01-21 NOTE — PROGRESS NOTES
[facility]    Hepatology follow-up    CHIEF COMPLAINT AND REASON FOR THE VISIT:  Alcoholic liver disease.      SUBJECTIVE:  Mr. Aguilar is a 59-year-old white male whom I have followed here for alcoholic cirrhosis.  When he presented initially, he had hematemesis, came to the emergency room and he had at that time esophageal varices.  These were banded.  Since then, he did abstain from alcoholic beverages and, in fact, this is for quite a long time, maybe 4 or 5 years.  He had always a very low MELD score.  His platelet is quasi normal and liver function test is also normal.  He did not have any - now and last time - significant fluid retention, no edema, no abdominal distension.  He also did not have any signs of jaundice, lethargy or confusion.  He denied since I last saw him having any gastrointestinal bleeding including melena, hematemesis or hematochezia.  As far as other GI symptoms were concerned, he denied having significant abdominal pain.  He had nausea and for that reason, we sent him before for a gastric emptying study, which he has not done.  Please note that the patient is diabetic.  He does not have any nausea.  He is moving his bowels also regularly and weight has remained stable.     He has other issues also which includes cauda equina syndrome for which he had back surgery.  He had a stimulator that was there, and was taken out and he had another one placed in 06/28/2018.  For details see my previous notes.    Medical hx Surgical hx   Past Medical History:   Diagnosis Date     Actinic keratosis     aldara     Anxiety      Cancer (H)     squamous cell skin CA     Cauda equina spinal cord injury (H)      Chronic sinusitis 5-1-16     Diastasis recti      Esophageal reflux      Esophageal varices in cirrhosis (H) 4/1/2014    Hospitalized for UGI blee 3/28/14, endoscopy revealed bleeding varices.     Essential hypertension, benign      Intermittent asthma      Mild  depression (H)      Mixed hyperlipidemia      Nasal polyps 5-1-16     Other chronic pain      SCCA (squamous cell carcinoma) of skin      Seasonal allergic conjunctivitis      Type II or unspecified type diabetes mellitus without mention of complication, not stated as uncontrolled      Unspecified site of spinal cord injury without evidence of spinal bone injury     due to back surgery      Past Surgical History:   Procedure Laterality Date     BACK SURGERY  August 2009     C APPENDECTOMY  1974     COLONOSCOPY N/A 5/12/2016    Procedure: COMBINED COLONOSCOPY, SINGLE OR MULTIPLE BIOPSY/POLYPECTOMY BY BIOPSY;  Surgeon: Ana Paula Urbina MD;  Location:  GI     ENDOSCOPY UPPER, COLONOSCOPY, COMBINED  10/19/2011    Procedure:COMBINED ENDOSCOPY UPPER, COLONOSCOPY; Upper Endoscopy, Colonoscopy with Polypectomy x4; Surgeon:AMBAR RODRÍGUEZIQ; Location: OR     ENT SURGERY  1-2016    Ongoing since then     ESOPHAGOSCOPY, GASTROSCOPY, DUODENOSCOPY (EGD), COMBINED  3/28/2014    Procedure: COMBINED ESOPHAGOSCOPY, GASTROSCOPY, DUODENOSCOPY (EGD);  EGD, Gastric Biopsies, Esophageal Banding;  Surgeon: Reyna Tovar MD;  Location:  OR     ESOPHAGOSCOPY, GASTROSCOPY, DUODENOSCOPY (EGD), COMBINED  6/9/2014    Procedure: COMBINED ESOPHAGOSCOPY, GASTROSCOPY, DUODENOSCOPY (EGD);  Surgeon: Curtis Mendez MD;  Location: [facility]     ESOPHAGOSCOPY, GASTROSCOPY, DUODENOSCOPY (EGD), COMBINED  7/24/2014    Procedure: COMBINED ESOPHAGOSCOPY, GASTROSCOPY, DUODENOSCOPY (EGD);  Surgeon: Gerard Samaniego MD;  Location:  OR     ESOPHAGOSCOPY, GASTROSCOPY, DUODENOSCOPY (EGD), COMBINED N/A 10/31/2014    Procedure: COMBINED ESOPHAGOSCOPY, GASTROSCOPY, DUODENOSCOPY (EGD);  Surgeon: Gerard Samaniego MD;  Location:  OR     ESOPHAGOSCOPY, GASTROSCOPY, DUODENOSCOPY (EGD), COMBINED N/A 5/12/2016    Procedure: COMBINED ESOPHAGOSCOPY, GASTROSCOPY, DUODENOSCOPY (EGD);  Surgeon: Ana Paula Urbina MD;  Location:   GI     ESOPHAGOSCOPY, GASTROSCOPY, DUODENOSCOPY (EGD), COMBINED N/A 8/2/2018    Procedure: COMBINED ESOPHAGOSCOPY, GASTROSCOPY, DUODENOSCOPY (EGD);  EGD;  Surgeon: Yu Wagner MD;  Location: [facility]     HCL SQUAMOUS CELL CARCINOMA AG  10/07    left forearm     HERNIORRHAPHY UMBILICAL  11/8/2012    Procedure: HERNIORRHAPHY UMBILICAL;  Open Umbilical Hernia Repair With Mesh ;  Surgeon: Chase Nicholson MD;  [facility]     INSERT STIMULATOR DORSAL COLUMN N/A 6/28/2018    Procedure: INSERT STIMULATOR DORSAL COLUMN;;  Surgeon: Elvis Sauceda MD;  [facility]     neuro stimulator  2010     REMOVE GENERATOR STIMULATOR (LOCATION) N/A 6/28/2018    Procedure: REMOVE GENERATOR STIMULATOR (LOCATION);  Spinal Cord Stimulator and IPG Explant and Re-Implant of SCS System (Leads and IPG);  Surgeon: Elvis Sauceda MD;  Location:  OR     SURGICAL HISTORY OF -   1/02    abcess tooth     SURGICAL HISTORY OF -   1999    L4-5 laminectomy, cauda equina syndrome     SURGICAL HISTORY OF -   +    herniated disk repair     TONSILLECTOMY  10 1964     TRANSPOSITION ULNAR NERVE (ELBOW)      right          Medications  Prior to Admission medications    Medication Sig Start Date End Date Taking? Authorizing Provider   ACE/ARB NOT PRESCRIBED, INTENTIONAL, ACE & ARB not prescribed due to Allergy 8/2/12  Yes Ksenia Bean APRN CNP   albuterol (PROAIR HFA/PROVENTIL HFA/VENTOLIN HFA) 108 (90 Base) MCG/ACT inhaler INHALE 2 PUFFS BY MOUTH EVERY 6 HOURS AS NEEDED FOR SHORTNESS OF BREATH/DYSPNEA OR WHEEZING 9/21/19  Yes Wegener, Joel Daniel Irwin, MD   ammonium lactate (LAC-HYDRIN) 12 % cream Apply topically 2 times daily as needed for dry skin 11/1/17  Yes Victor M Anaya DPM   baclofen (LIORESAL) 10 MG tablet TAKE 2 TABLETS BY MOUTH THREE TIMES DAILY 5/6/19  Yes Wegener, Joel Daniel Irwin, MD   BD ROSE U/F 32G X 4 MM insulin pen needle USE ONCE DAILY AS DIRECTED.  **PLEASE SCHEDULE AN APPOINTMENT** 6/28/19  Yes Wegener, Joel Daniel Irwin, MD   blood glucose (ACCU-CHEK KARISHMA PLUS) test strip USE TO TEST BLOOD SUGARS ONCE DAILY 9/5/19  Yes Wegener, Joel Daniel Irwin, MD   blood glucose monitoring (NO BRAND SPECIFIED) test strip Use to test blood sugars one time daily or as directed    Accucheck Richards plus test strips 10/6/16  Yes Wegener, Joel Daniel Irwin, MD   blood glucose monitoring (SOFTCLIX) lancets Use to test blood sugar 1 time daily or as directed. 2/22/18  Yes Wegener, Joel Daniel Irwin, MD   hydrochlorothiazide (HYDRODIURIL) 25 MG tablet TAKE 1 TABLET (25 MG) BY MOUTH DAILY 4/4/19  Yes Ksenia Drummond MD   insulin glargine (BASAGLAR KWIKPEN) 100 UNIT/ML pen INJECT 26 UNITS SUBCUTANEOUS DAILY 1/25/19  Yes Wegener, Joel Daniel Irwin, MD   mupirocin (BACTROBAN) 2 % ointment Apply to anterior nares BID X 2 month supply  Patient taking differently: Apply topically as needed Apply to anterior nares BID X 2 month supply 2/16/17  Yes Antonio Chávez MD   potassium chloride ER (K-TAB) 20 MEQ CR tablet Take 1 tablet (20 mEq) by mouth daily 6/7/19  Yes Wegener, Joel Daniel Irwin, MD   promethazine (PHENERGAN) 25 MG tablet Take 1 tablet (25 mg) by mouth 3 times daily as needed (nausea) 12/23/19  Yes Wegener, Joel Daniel Irwin, MD   propranolol (INDERAL) 40 MG tablet Take 2 tablets (80 mg) by mouth 2 times daily 8/2/19 1/21/20 Yes Wegener, Joel Daniel Irwin, MD   rosuvastatin (CRESTOR) 20 MG tablet Take 1 tablet (20 mg) by mouth daily 1/17/19 1/21/20 Yes Wegener, Joel Daniel Irwin, MD   sertraline (ZOLOFT) 100 MG tablet TAKE 2 TABLETS BY MOUTH EVERY DAY 10/8/19  Yes Wegener, Joel Daniel Irwin, MD   STATIN NOT PRESCRIBED, INTENTIONAL, Please choose reason not prescribed, below 9/17/18  Yes Wegener, Joel Daniel Irwin, MD   Syringe, Disposable, 25 ML MISC Syringe to be used for nasal irrigation 8/19/16  Yes Antonio Chávez MD   triamcinolone (KENALOG) 0.1  "% external ointment Apply sparingly to affected area three times daily for 14 days. 6/17/19  Yes Sonia Garcia MD       Allergies  Allergies   Allergen Reactions     Lisinopril Anaphylaxis     Swollen tongue; inability to swallow; drooling; hives; swollen face, neck, angioedema     Acetaminophen      Hx of cirrhosis      Amlodipine      Swelling, hives, possible angioedema       Morphine      b/p dropped and arms went numb  Hypotension     Quinolones Hives     Bactrim [Sulfamethoxazole W/Trimethoprim] Rash     Levaquin Swelling and Rash     Swelling in lip and tongue felt thick       Review of systems  A 10-point review of systems was negative    Examination  BP (!) 174/78   Pulse 57   Ht 1.803 m (5' 11\")   Wt 102.8 kg (226 lb 11.2 oz)   SpO2 98%   BMI 31.62 kg/m    Body mass index is 31.62 kg/m .    Gen- well, NAD, A+Ox3, normal color  Lym- no palpable LAD  CVS- RRR  RS- CTA  Abd- Obese. No shifting dullness.  Extr- hands normal, no EVA  Skin- no rash or jaundice  Neuro- no asterixis  Psych- normal mood    Laboratory  Lab Results   Component Value Date     01/21/2020    POTASSIUM 3.6 01/21/2020    CHLORIDE 104 01/21/2020    CO2 31 01/21/2020    BUN 10 01/21/2020    CR 0.75 01/21/2020       Lab Results   Component Value Date    BILITOTAL 0.7 01/21/2020    ALT 31 01/21/2020    AST 14 01/21/2020    ALKPHOS 107 01/21/2020       Lab Results   Component Value Date    ALBUMIN 4.0 01/21/2020    PROTTOTAL 8.0 01/21/2020        Lab Results   Component Value Date    WBC 8.0 01/21/2020    HGB 15.0 01/21/2020    MCV 91 01/21/2020     01/21/2020       Lab Results   Component Value Date    INR 1.12 01/21/2020     MELD-Na score: 7 at 1/21/2020  7:39 AM  MELD score: 7 at 1/21/2020  7:39 AM  Calculated from:  Serum Creatinine: 0.75 mg/dL (Rounded to 1 mg/dL) at 1/21/2020  7:39 AM  Serum Sodium: 138 mmol/L (Rounded to 137 mmol/L) at 1/21/2020  7:39 AM  Total Bilirubin: 0.7 mg/dL (Rounded to 1 mg/dL) at 1/21/2020  " 7:39 AM  INR(ratio): 1.12 at 1/21/2020  7:39 AM  Age: 59 years    Radiology    ASSESSMENT AND PLAN:  Cirrhosis of the liver.  Mr. Aguilar has cirrhosis of the liver.  When we initially diagnosed him, there were esophageal varices.  He had gastrointestinal bleeding.  Since then he has abstained from alcoholic beverages and it is over 5 years now. Last drink was 03/2014. He has no fluid retention.  Last endoscopy did not have any esophageal varices.  His platelets are normal and liver function tests have continued to be normal.  Plan will be to get a fibrosis scan and see if there is any regression as this is possible when the patient initially presented with acute on chronic liver disease.  We congratulated him on staying away from alcohol.  Besides that, his MELD score from today is 7, which is where it was last time.  Plan will be to continue doing surveillance for HCC and for any worsening, expecting that the patient will not go back on alcohol.  Please note that the patient had nausea which we wanted to investigate and, in fact, we ordered since he had diabetes, gastric emptying study, which he has not done.  He will do that.  We will also order an imaging of his liver.  Otherwise, he will be seen here in 6-12 months.      This was a 25-minute visit, of which more than 50% was spent in explaining to the patient what our plan of care was.  We answered all his questions.      cc:   Joel Wegener, MD   [facility]   [address] [place] [phone]       Kimberly Ramirez MD  Hepatology  [facility]

## 2020-01-21 NOTE — LETTER
1/21/2020      RE: Erwin Aguilar  [address]  [address]  [place] [address]       [facility]    Hepatology follow-up    CHIEF COMPLAINT AND REASON FOR THE VISIT:  Alcoholic liver disease.      SUBJECTIVE:  Mr. Aguilar is a 59-year-old white male whom I have followed here for alcoholic cirrhosis.  When he presented initially, he had hematemesis, came to the emergency room and he had at that time esophageal varices.  These were banded.  Since then, he did abstain from alcoholic beverages and, in fact, this is for quite a long time, maybe 4 or 5 years.  He had always a very low MELD score.  His platelet is quasi normal and liver function test is also normal.  He did not have any - now and last time - significant fluid retention, no edema, no abdominal distension.  He also did not have any signs of jaundice, lethargy or confusion.  He denied since I last saw him having any gastrointestinal bleeding including melena, hematemesis or hematochezia.  As far as other GI symptoms were concerned, he denied having significant abdominal pain.  He had nausea and for that reason, we sent him before for a gastric emptying study, which he has not done.  Please note that the patient is diabetic.  He does not have any nausea.  He is moving his bowels also regularly and weight has remained stable.     He has other issues also which includes cauda equina syndrome for which he had back surgery.  He had a stimulator that was there, and was taken out and he had another one placed in 06/28/2018.  For details see my previous notes.    Medical hx Surgical hx   Past Medical History:   Diagnosis Date     Actinic keratosis     aldara     Anxiety      Cancer (H)     squamous cell skin CA     Cauda equina spinal cord injury (H)      Chronic sinusitis 5-1-16     Diastasis recti      Esophageal reflux      Esophageal varices in cirrhosis (H) 4/1/2014    Hospitalized for UGI blee 3/28/14, endoscopy revealed  bleeding varices.     Essential hypertension, benign      Intermittent asthma      Mild depression (H)      Mixed hyperlipidemia      Nasal polyps 5-1-16     Other chronic pain      SCCA (squamous cell carcinoma) of skin      Seasonal allergic conjunctivitis      Type II or unspecified type diabetes mellitus without mention of complication, not stated as uncontrolled      Unspecified site of spinal cord injury without evidence of spinal bone injury     due to back surgery      Past Surgical History:   Procedure Laterality Date     BACK SURGERY  August 2009     C APPENDECTOMY  1974     COLONOSCOPY N/A 5/12/2016    Procedure: COMBINED COLONOSCOPY, SINGLE OR MULTIPLE BIOPSY/POLYPECTOMY BY BIOPSY;  Surgeon: Ana Paula Urbina MD;  Location:  GI     ENDOSCOPY UPPER, COLONOSCOPY, COMBINED  10/19/2011    Procedure:COMBINED ENDOSCOPY UPPER, COLONOSCOPY; Upper Endoscopy, Colonoscopy with Polypectomy x4; Surgeon:AMBAR RODRÍGUEZIQ; Location: OR     ENT SURGERY  1-2016    Ongoing since then     ESOPHAGOSCOPY, GASTROSCOPY, DUODENOSCOPY (EGD), COMBINED  3/28/2014    Procedure: COMBINED ESOPHAGOSCOPY, GASTROSCOPY, DUODENOSCOPY (EGD);  EGD, Gastric Biopsies, Esophageal Banding;  Surgeon: Reyna Tovar MD;  Location:  OR     ESOPHAGOSCOPY, GASTROSCOPY, DUODENOSCOPY (EGD), COMBINED  6/9/2014    Procedure: COMBINED ESOPHAGOSCOPY, GASTROSCOPY, DUODENOSCOPY (EGD);  Surgeon: Curtis Mendez MD;  Location:  GI     ESOPHAGOSCOPY, GASTROSCOPY, DUODENOSCOPY (EGD), COMBINED  7/24/2014    Procedure: COMBINED ESOPHAGOSCOPY, GASTROSCOPY, DUODENOSCOPY (EGD);  Surgeon: Gerard Samaniego MD;  Location:  OR     ESOPHAGOSCOPY, GASTROSCOPY, DUODENOSCOPY (EGD), COMBINED N/A 10/31/2014    Procedure: COMBINED ESOPHAGOSCOPY, GASTROSCOPY, DUODENOSCOPY (EGD);  Surgeon: Gerard Samaniego MD;  Location:  OR     ESOPHAGOSCOPY, GASTROSCOPY, DUODENOSCOPY (EGD), COMBINED N/A 5/12/2016    Procedure: COMBINED ESOPHAGOSCOPY,  GASTROSCOPY, DUODENOSCOPY (EGD);  Surgeon: Ana Paula Urbina MD;  [facility]: [facility]     ESOPHAGOSCOPY, GASTROSCOPY, DUODENOSCOPY (EGD), COMBINED N/A 8/2/2018    Procedure: COMBINED ESOPHAGOSCOPY, GASTROSCOPY, DUODENOSCOPY (EGD);  EGD;  Surgeon: Yu Wagner MD;  Location: [facility]     HCL SQUAMOUS CELL CARCINOMA AG  10/07    left forearm     HERNIORRHAPHY UMBILICAL  11/8/2012    Procedure: HERNIORRHAPHY UMBILICAL;  Open Umbilical Hernia Repair With Mesh ;  Surgeon: Chase Nicholson MD;  [facility]     INSERT STIMULATOR DORSAL COLUMN N/A 6/28/2018    Procedure: INSERT STIMULATOR DORSAL COLUMN;;  Surgeon: Elvis Sauceda MD;  [facility]     neuro stimulator  2010     REMOVE GENERATOR STIMULATOR (LOCATION) N/A 6/28/2018    Procedure: REMOVE GENERATOR STIMULATOR (LOCATION);  Spinal Cord Stimulator and IPG Explant and Re-Implant of SCS System (Leads and IPG);  Surgeon: Elvis Sauceda MD;  [facility]     SURGICAL HISTORY OF -   1/02    abcess tooth     SURGICAL HISTORY OF -   1999    L4-5 laminectomy, cauda equina syndrome     SURGICAL HISTORY OF -   +    herniated disk repair     TONSILLECTOMY  10 1964     TRANSPOSITION ULNAR NERVE (ELBOW)      right          Medications  Prior to Admission medications    Medication Sig Start Date End Date Taking? Authorizing Provider   ACE/ARB NOT PRESCRIBED, INTENTIONAL, ACE & ARB not prescribed due to Allergy 8/2/12  Yes Ksenia Bean APRN CNP   albuterol (PROAIR HFA/PROVENTIL HFA/VENTOLIN HFA) 108 (90 Base) MCG/ACT inhaler INHALE 2 PUFFS BY MOUTH EVERY 6 HOURS AS NEEDED FOR SHORTNESS OF BREATH/DYSPNEA OR WHEEZING 9/21/19  Yes Wegener, Joel Daniel Irwin, MD   ammonium lactate (LAC-HYDRIN) 12 % cream Apply topically 2 times daily as needed for dry skin 11/1/17  Yes Victor M Anaya DPM   baclofen (LIORESAL) 10 MG tablet TAKE 2 TABLETS BY MOUTH THREE TIMES DAILY 5/6/19  Yes Wegener, Joel  Rm Hammonds MD   BD ROSE U/F 32G X 4 MM insulin pen needle USE ONCE DAILY AS DIRECTED. **PLEASE SCHEDULE AN APPOINTMENT** 6/28/19  Yes Wegener, Joel Daniel Irwin, MD   blood glucose (ACCU-CHEK KARISHMA PLUS) test strip USE TO TEST BLOOD SUGARS ONCE DAILY 9/5/19  Yes Wegener, Joel Daniel Irwin, MD   blood glucose monitoring (NO BRAND SPECIFIED) test strip Use to test blood sugars one time daily or as directed    Accucheck Richards plus test strips 10/6/16  Yes Wegener, Joel Daniel Irwin, MD   blood glucose monitoring (SOFTCLIX) lancets Use to test blood sugar 1 time daily or as directed. 2/22/18  Yes Wegener, Joel Daniel Irwin, MD   hydrochlorothiazide (HYDRODIURIL) 25 MG tablet TAKE 1 TABLET (25 MG) BY MOUTH DAILY 4/4/19  Yes Ksenia Drummond MD   insulin glargine (BASAGLAR KWIKPEN) 100 UNIT/ML pen INJECT 26 UNITS SUBCUTANEOUS DAILY 1/25/19  Yes Wegener, Joel Daniel Irwin, MD   mupirocin (BACTROBAN) 2 % ointment Apply to anterior nares BID X 2 month supply  Patient taking differently: Apply topically as needed Apply to anterior nares BID X 2 month supply 2/16/17  Yes Antonio Chávez MD   potassium chloride ER (K-TAB) 20 MEQ CR tablet Take 1 tablet (20 mEq) by mouth daily 6/7/19  Yes Wegener, Joel Daniel Irwin, MD   promethazine (PHENERGAN) 25 MG tablet Take 1 tablet (25 mg) by mouth 3 times daily as needed (nausea) 12/23/19  Yes Wegener, Joel Daniel Irwin, MD   propranolol (INDERAL) 40 MG tablet Take 2 tablets (80 mg) by mouth 2 times daily 8/2/19 1/21/20 Yes Wegener, Joel Daniel Irwin, MD   rosuvastatin (CRESTOR) 20 MG tablet Take 1 tablet (20 mg) by mouth daily 1/17/19 1/21/20 Yes Wegener, Joel Daniel Irwin, MD   sertraline (ZOLOFT) 100 MG tablet TAKE 2 TABLETS BY MOUTH EVERY DAY 10/8/19  Yes Wegener, Joel Daniel Irwin, MD   STATIN NOT PRESCRIBED, INTENTIONAL, Please choose reason not prescribed, below 9/17/18  Yes Wegener, Joel Daniel Irwin, MD   Syringe, Disposable, 25 ML MISC Syringe to be used for  "nasal irrigation 8/19/16  Yes Antonio Chávez MD   triamcinolone (KENALOG) 0.1 % external ointment Apply sparingly to affected area three times daily for 14 days. 6/17/19  Yes Sonia Garcia MD       Allergies  Allergies   Allergen Reactions     Lisinopril Anaphylaxis     Swollen tongue; inability to swallow; drooling; hives; swollen face, neck, angioedema     Acetaminophen      Hx of cirrhosis      Amlodipine      Swelling, hives, possible angioedema       Morphine      b/p dropped and arms went numb  Hypotension     Quinolones Hives     Bactrim [Sulfamethoxazole W/Trimethoprim] Rash     Levaquin Swelling and Rash     Swelling in lip and tongue felt thick       Review of systems  A 10-point review of systems was negative    Examination  BP (!) 174/78   Pulse 57   Ht 1.803 m (5' 11\")   Wt 102.8 kg (226 lb 11.2 oz)   SpO2 98%   BMI 31.62 kg/m     Body mass index is 31.62 kg/m .    Gen- well, NAD, A+Ox3, normal color  Lym- no palpable LAD  CVS- RRR  RS- CTA  Abd- Obese. No shifting dullness.  Extr- hands normal, no EVA  Skin- no rash or jaundice  Neuro- no asterixis  Psych- normal mood    Laboratory  Lab Results   Component Value Date     01/21/2020    POTASSIUM 3.6 01/21/2020    CHLORIDE 104 01/21/2020    CO2 31 01/21/2020    BUN 10 01/21/2020    CR 0.75 01/21/2020       Lab Results   Component Value Date    BILITOTAL 0.7 01/21/2020    ALT 31 01/21/2020    AST 14 01/21/2020    ALKPHOS 107 01/21/2020       Lab Results   Component Value Date    ALBUMIN 4.0 01/21/2020    PROTTOTAL 8.0 01/21/2020        Lab Results   Component Value Date    WBC 8.0 01/21/2020    HGB 15.0 01/21/2020    MCV 91 01/21/2020     01/21/2020       Lab Results   Component Value Date    INR 1.12 01/21/2020     MELD-Na score: 7 at 1/21/2020  7:39 AM  MELD score: 7 at 1/21/2020  7:39 AM  Calculated from:  Serum Creatinine: 0.75 mg/dL (Rounded to 1 mg/dL) at 1/21/2020  7:39 AM  Serum Sodium: 138 mmol/L (Rounded to 137 " mmol/L) at 1/21/2020  7:39 AM  Total Bilirubin: 0.7 mg/dL (Rounded to 1 mg/dL) at 1/21/2020  7:39 AM  INR(ratio): 1.12 at 1/21/2020  7:39 AM  Age: 59 years    Radiology    ASSESSMENT AND PLAN:  Cirrhosis of the liver.  Mr. Aguilar has cirrhosis of the liver.  When we initially diagnosed him, there were esophageal varices.  He had gastrointestinal bleeding.  Since then he has abstained from alcoholic beverages and it is over 5 years now. Last drink was 03/2014. He has no fluid retention.  Last endoscopy did not have any esophageal varices.  His platelets are normal and liver function tests have continued to be normal.  Plan will be to get a fibrosis scan and see if there is any regression as this is possible when the patient initially presented with acute on chronic liver disease.  We congratulated him on staying away from alcohol.  Besides that, his MELD score from today is 7, which is where it was last time.  Plan will be to continue doing surveillance for HCC and for any worsening, expecting that the patient will not go back on alcohol.  Please note that the patient had nausea which we wanted to investigate and, in fact, we ordered since he had diabetes, gastric emptying study, which he has not done.  He will do that.  We will also order an imaging of his liver.  Otherwise, he will be seen here in 6-12 months.      This was a 25-minute visit, of which more than 50% was spent in explaining to the patient what our plan of care was.  We answered all his questions.      cc:   Joel Wegener, MD   [facility]   [address] [place] [phone]       Kimberly Ramirez MD  Hepatology  [facility]

## 2020-01-21 NOTE — LETTER
1/21/2020       RE: Erwin Aguilar  [address]  [address]  [place] [address]     Dear Colleague,    Thank you for referring your patient, Erwin Aguilar, to the [facility] HEPATOLOGY at [facility]. Please see a copy of my visit note below.    [facility]    Hepatology follow-up    CHIEF COMPLAINT AND REASON FOR THE VISIT:  Alcoholic liver disease.      SUBJECTIVE:  Mr. Aguilar is a 59-year-old white male whom I have followed here for alcoholic cirrhosis.  When he presented initially, he had hematemesis, came to the emergency room and he had at that time esophageal varices.  These were banded.  Since then, he did abstain from alcoholic beverages and, in fact, this is for quite a long time, maybe 4 or 5 years.  He had always a very low MELD score.  His platelet is quasi normal and liver function test is also normal.  He did not have any - now and last time - significant fluid retention, no edema, no abdominal distension.  He also did not have any signs of jaundice, lethargy or confusion.  He denied since I last saw him having any gastrointestinal bleeding including melena, hematemesis or hematochezia.  As far as other GI symptoms were concerned, he denied having significant abdominal pain.  He had nausea and for that reason, we sent him before for a gastric emptying study, which he has not done.  Please note that the patient is diabetic.  He does not have any nausea.  He is moving his bowels also regularly and weight has remained stable.     He has other issues also which includes cauda equina syndrome for which he had back surgery.  He had a stimulator that was there, and was taken out and he had another one placed in 06/28/2018.  For details see my previous notes.    Medical hx Surgical hx   Past Medical History:   Diagnosis Date     Actinic keratosis     aldara     Anxiety      Cancer (H)     squamous cell skin CA     Cauda equina spinal  cord injury (H)      Chronic sinusitis 5-1-16     Diastasis recti      Esophageal reflux      Esophageal varices in cirrhosis (H) 4/1/2014    Hospitalized for UGI blee 3/28/14, endoscopy revealed bleeding varices.     Essential hypertension, benign      Intermittent asthma      Mild depression (H)      Mixed hyperlipidemia      Nasal polyps 5-1-16     Other chronic pain      SCCA (squamous cell carcinoma) of skin      Seasonal allergic conjunctivitis      Type II or unspecified type diabetes mellitus without mention of complication, not stated as uncontrolled      Unspecified site of spinal cord injury without evidence of spinal bone injury     due to back surgery      Past Surgical History:   Procedure Laterality Date     BACK SURGERY  August 2009     C APPENDECTOMY  1974     COLONOSCOPY N/A 5/12/2016    Procedure: COMBINED COLONOSCOPY, SINGLE OR MULTIPLE BIOPSY/POLYPECTOMY BY BIOPSY;  Surgeon: Ana Paula Urbina MD;  Location:  GI     ENDOSCOPY UPPER, COLONOSCOPY, COMBINED  10/19/2011    Procedure:COMBINED ENDOSCOPY UPPER, COLONOSCOPY; Upper Endoscopy, Colonoscopy with Polypectomy x4; Surgeon:AMBAR RODRÍGUEZ; Location:[facility] OR     ENT SURGERY  1-2016    Ongoing since then     ESOPHAGOSCOPY, GASTROSCOPY, DUODENOSCOPY (EGD), COMBINED  3/28/2014    Procedure: COMBINED ESOPHAGOSCOPY, GASTROSCOPY, DUODENOSCOPY (EGD);  EGD, Gastric Biopsies, Esophageal Banding;  Surgeon: Reyna Tovar MD;  [facility]     ESOPHAGOSCOPY, GASTROSCOPY, DUODENOSCOPY (EGD), COMBINED  6/9/2014    Procedure: COMBINED ESOPHAGOSCOPY, GASTROSCOPY, DUODENOSCOPY (EGD);  Surgeon: Curtis Mendez MD;  Location:  GI     ESOPHAGOSCOPY, GASTROSCOPY, DUODENOSCOPY (EGD), COMBINED  7/24/2014    Procedure: COMBINED ESOPHAGOSCOPY, GASTROSCOPY, DUODENOSCOPY (EGD);  Surgeon: Gerard Samaniego MD;  [facility]     ESOPHAGOSCOPY, GASTROSCOPY, DUODENOSCOPY (EGD), COMBINED N/A 10/31/2014    Procedure: COMBINED ESOPHAGOSCOPY,  GASTROSCOPY, DUODENOSCOPY (EGD);  Surgeon: Gerard Samaniego MD;  [facility]     ESOPHAGOSCOPY, GASTROSCOPY, DUODENOSCOPY (EGD), COMBINED N/A 5/12/2016    Procedure: COMBINED ESOPHAGOSCOPY, GASTROSCOPY, DUODENOSCOPY (EGD);  Surgeon: Ana Paula Urbina MD;  [facility] GI     ESOPHAGOSCOPY, GASTROSCOPY, DUODENOSCOPY (EGD), COMBINED N/A 8/2/2018    Procedure: COMBINED ESOPHAGOSCOPY, GASTROSCOPY, DUODENOSCOPY (EGD);  EGD;  Surgeon: Yu Wagner MD;  [facility] GI     HCL SQUAMOUS CELL CARCINOMA AG  10/07    left forearm     HERNIORRHAPHY UMBILICAL  11/8/2012    Procedure: HERNIORRHAPHY UMBILICAL;  Open Umbilical Hernia Repair With Mesh ;  Surgeon: Chase Nicholson MD;  Location: [facility] OR     INSERT STIMULATOR DORSAL COLUMN N/A 6/28/2018    Procedure: INSERT STIMULATOR DORSAL COLUMN;;  Surgeon: Elvis Sauceda MD;  [facility] OR     neuro stimulator  2010     REMOVE GENERATOR STIMULATOR (LOCATION) N/A 6/28/2018    Procedure: REMOVE GENERATOR STIMULATOR (LOCATION);  Spinal Cord Stimulator and IPG Explant and Re-Implant of SCS System (Leads and IPG);  Surgeon: Elvis Sauceda MD;  Location:  OR     SURGICAL HISTORY OF -   1/02    abcess tooth     SURGICAL HISTORY OF -   1999    L4-5 laminectomy, cauda equina syndrome     SURGICAL HISTORY OF -   +    herniated disk repair     TONSILLECTOMY  10 1964     TRANSPOSITION ULNAR NERVE (ELBOW)      right          Medications  Prior to Admission medications    Medication Sig Start Date End Date Taking? Authorizing Provider   ACE/ARB NOT PRESCRIBED, INTENTIONAL, ACE & ARB not prescribed due to Allergy 8/2/12  Yes Ksenia Bean APRN CNP   albuterol (PROAIR HFA/PROVENTIL HFA/VENTOLIN HFA) 108 (90 Base) MCG/ACT inhaler INHALE 2 PUFFS BY MOUTH EVERY 6 HOURS AS NEEDED FOR SHORTNESS OF BREATH/DYSPNEA OR WHEEZING 9/21/19  Yes Wegener, Joel Daniel Irwin, MD   ammonium lactate (LAC-HYDRIN) 12 % cream  Apply topically 2 times daily as needed for dry skin 11/1/17  Yes Victor M Anaya DPM   baclofen (LIORESAL) 10 MG tablet TAKE 2 TABLETS BY MOUTH THREE TIMES DAILY 5/6/19  Yes Wegener, Joel Daniel Irwin, MD   BD ROSE U/F 32G X 4 MM insulin pen needle USE ONCE DAILY AS DIRECTED. **PLEASE SCHEDULE AN APPOINTMENT** 6/28/19  Yes Wegener, Joel Daniel Irwin, MD   blood glucose (ACCU-CHEK KARISHMA PLUS) test strip USE TO TEST BLOOD SUGARS ONCE DAILY 9/5/19  Yes Wegener, Joel Daniel Irwin, MD   blood glucose monitoring (NO BRAND SPECIFIED) test strip Use to test blood sugars one time daily or as directed    Accucheck Richards plus test strips 10/6/16  Yes Wegener, Joel Daniel Irwin, MD   blood glucose monitoring (SOFTCLIX) lancets Use to test blood sugar 1 time daily or as directed. 2/22/18  Yes Wegener, Joel Daniel Irwin, MD   hydrochlorothiazide (HYDRODIURIL) 25 MG tablet TAKE 1 TABLET (25 MG) BY MOUTH DAILY 4/4/19  Yes Ksenia Drummond MD   insulin glargine (BASAGLAR KWIKPEN) 100 UNIT/ML pen INJECT 26 UNITS SUBCUTANEOUS DAILY 1/25/19  Yes Wegener, Joel Daniel Irwin, MD   mupirocin (BACTROBAN) 2 % ointment Apply to anterior nares BID X 2 month supply  Patient taking differently: Apply topically as needed Apply to anterior nares BID X 2 month supply 2/16/17  Yes Antonio Chávez MD   potassium chloride ER (K-TAB) 20 MEQ CR tablet Take 1 tablet (20 mEq) by mouth daily 6/7/19  Yes Wegener, Joel Daniel Irwin, MD   promethazine (PHENERGAN) 25 MG tablet Take 1 tablet (25 mg) by mouth 3 times daily as needed (nausea) 12/23/19  Yes Wegener, Joel Daniel Irwin, MD   propranolol (INDERAL) 40 MG tablet Take 2 tablets (80 mg) by mouth 2 times daily 8/2/19 1/21/20 Yes Wegener, Joel Daniel Irwin, MD   rosuvastatin (CRESTOR) 20 MG tablet Take 1 tablet (20 mg) by mouth daily 1/17/19 1/21/20 Yes Wegener, Joel Daniel Irwin, MD   sertraline (ZOLOFT) 100 MG tablet TAKE 2 TABLETS BY MOUTH EVERY DAY 10/8/19  Yes Wegener, Joel  "Rm Hammonds MD   STATIN NOT PRESCRIBED, INTENTIONAL, Please choose reason not prescribed, below 9/17/18  Yes Wegener, Joel Daniel Irwin, MD   Syringe, Disposable, 25 ML MISC Syringe to be used for nasal irrigation 8/19/16  Yes Antonio Chávez MD   triamcinolone (KENALOG) 0.1 % external ointment Apply sparingly to affected area three times daily for 14 days. 6/17/19  Yes Sonia Garcia MD       Allergies  Allergies   Allergen Reactions     Lisinopril Anaphylaxis     Swollen tongue; inability to swallow; drooling; hives; swollen face, neck, angioedema     Acetaminophen      Hx of cirrhosis      Amlodipine      Swelling, hives, possible angioedema       Morphine      b/p dropped and arms went numb  Hypotension     Quinolones Hives     Bactrim [Sulfamethoxazole W/Trimethoprim] Rash     Levaquin Swelling and Rash     Swelling in lip and tongue felt thick       Review of systems  A 10-point review of systems was negative    Examination  BP (!) 174/78   Pulse 57   Ht 1.803 m (5' 11\")   Wt 102.8 kg (226 lb 11.2 oz)   SpO2 98%   BMI 31.62 kg/m     Body mass index is 31.62 kg/m .    Gen- well, NAD, A+Ox3, normal color  Lym- no palpable LAD  CVS- RRR  RS- CTA  Abd- Obese. No shifting dullness.  Extr- hands normal, no EVA  Skin- no rash or jaundice  Neuro- no asterixis  Psych- normal mood    Laboratory  Lab Results   Component Value Date     01/21/2020    POTASSIUM 3.6 01/21/2020    CHLORIDE 104 01/21/2020    CO2 31 01/21/2020    BUN 10 01/21/2020    CR 0.75 01/21/2020       Lab Results   Component Value Date    BILITOTAL 0.7 01/21/2020    ALT 31 01/21/2020    AST 14 01/21/2020    ALKPHOS 107 01/21/2020       Lab Results   Component Value Date    ALBUMIN 4.0 01/21/2020    PROTTOTAL 8.0 01/21/2020        Lab Results   Component Value Date    WBC 8.0 01/21/2020    HGB 15.0 01/21/2020    MCV 91 01/21/2020     01/21/2020       Lab Results   Component Value Date    INR 1.12 01/21/2020     MELD-Na " score: 7 at 1/21/2020  7:39 AM  MELD score: 7 at 1/21/2020  7:39 AM  Calculated from:  Serum Creatinine: 0.75 mg/dL (Rounded to 1 mg/dL) at 1/21/2020  7:39 AM  Serum Sodium: 138 mmol/L (Rounded to 137 mmol/L) at 1/21/2020  7:39 AM  Total Bilirubin: 0.7 mg/dL (Rounded to 1 mg/dL) at 1/21/2020  7:39 AM  INR(ratio): 1.12 at 1/21/2020  7:39 AM  Age: 59 years    Radiology    ASSESSMENT AND PLAN:  Cirrhosis of the liver.  Mr. Aguilar has cirrhosis of the liver.  When we initially diagnosed him, there were esophageal varices.  He had gastrointestinal bleeding.  Since then he has abstained from alcoholic beverages and it is over 5 years now. Last drink was 03/2014. He has no fluid retention.  Last endoscopy did not have any esophageal varices.  His platelets are normal and liver function tests have continued to be normal.  Plan will be to get a fibrosis scan and see if there is any regression as this is possible when the patient initially presented with acute on chronic liver disease.  We congratulated him on staying away from alcohol.  Besides that, his MELD score from today is 7, which is where it was last time.  Plan will be to continue doing surveillance for HCC and for any worsening, expecting that the patient will not go back on alcohol.  Please note that the patient had nausea which we wanted to investigate and, in fact, we ordered since he had diabetes, gastric emptying study, which he has not done.  He will do that.  We will also order an imaging of his liver.  Otherwise, he will be seen here in 6-12 months.      This was a 25-minute visit, of which more than 50% was spent in explaining to the patient what our plan of care was.  We answered all his questions.      cc:   Joel Wegener, MD   [address] [address]       Kimberly Ramirez MD  Hepatology  [facility]    Again, thank you for allowing me to participate in the care of your  patient.      Sincerely,    Kimberly Ramirez MD

## 2020-01-21 NOTE — NURSING NOTE
Chief Complaint   Patient presents with     RECHECK     Cirrhosis of liver      Velasquez Harris MA

## 2020-02-04 DIAGNOSIS — Z79.4 TYPE 2 DIABETES MELLITUS WITHOUT COMPLICATION, WITH LONG-TERM CURRENT USE OF INSULIN (H): ICD-10-CM

## 2020-02-04 DIAGNOSIS — E11.9 TYPE 2 DIABETES MELLITUS WITHOUT COMPLICATION, WITH LONG-TERM CURRENT USE OF INSULIN (H): ICD-10-CM

## 2020-02-04 DIAGNOSIS — E78.5 HYPERLIPIDEMIA LDL GOAL <100: ICD-10-CM

## 2020-02-04 RX ORDER — INSULIN GLARGINE 100 [IU]/ML
26 INJECTION, SOLUTION SUBCUTANEOUS DAILY
Qty: 30 ML | Refills: 0 | Status: SHIPPED | OUTPATIENT
Start: 2020-02-04 | End: 2020-06-17

## 2020-02-04 RX ORDER — ROSUVASTATIN CALCIUM 20 MG/1
TABLET, COATED ORAL
Qty: 0.1 TABLET | Refills: 0 | OUTPATIENT
Start: 2020-02-04

## 2020-02-04 NOTE — TELEPHONE ENCOUNTER
"Requested Prescriptions   Pending Prescriptions Disp Refills     rosuvastatin (CRESTOR) 20 MG tablet [Pharmacy Med Name: ROSUVASTATIN CALCIUM 20 MG TAB] 90 tablet 3     Sig: TAKE 1 TABLET BY MOUTH EVERY DAY  Last Written Prescription Date:  1/21/2020  Last Fill Quantity: 90 tablet,  # refills: 3   Last Office Visit: 10/8/2019   Future Office Visit:    Next 5 appointments (look out 90 days)    Feb 06, 2020 10:00 AM CST  Nurse Only with  MEDICAL ASSISTANT  [address] [id number] [phone]              Statins Protocol Passed - 2/4/2020  6:55 AM        Passed - LDL on file in past 12 months     Recent Labs   Lab Test 04/02/19  0927   *           Passed - No abnormal creatine kinase in past 12 months     No lab results found.           Passed - Recent (12 mo) or future (30 days) visit within the authorizing provider's specialty     Patient has had an office visit with the authorizing provider or a provider within the authorizing providers department within the previous 12 mos or has a future within next 30 days. See \"Patient Info\" tab in inbasket, or \"Choose Columns\" in Meds & Orders section of the refill encounter.            Passed - Medication is active on med list        Passed - Patient is age 18 or older     _________________________________________________________________       insulin glargine (BASAGLAR KWIKPEN) 100 UNIT/ML pen [Pharmacy Med Name: BASAGLAR 100 UNIT/ML KWIKPEN] 15 mL 11     Sig: INJECT 26 UNITS SUBCUTANEOUS DAILY  Last Written Prescription Date:  1/25/2019  Last Fill Quantity: 3ml,  # refills: 11   Last Office Visit: 10/8/2019   Future Office Visit:    Next 5 appointments (look out 90 days)    Feb 06, 2020 10:00 AM CST  Nurse Only with  MEDICAL ASSISTANT  [facility] ([facility]) [address] [place] [id number] [phone]              Long Acting " "Insulin Protocol Failed - 2/4/2020  6:55 AM        Failed - Blood pressure less than 140/90 in past 6 months     BP Readings from Last 3 Encounters:   01/21/20 (!) 174/78   10/08/19 130/80   07/02/19 180/82           Passed - LDL on file in past 12 months     Recent Labs   Lab Test 04/02/19  0927   *           Passed - Microalbumin on file in past 12 months     Recent Labs   Lab Test 10/08/19  0843   MICROL <5   UMALCR Unable to calculate due to low value           Passed - Serum creatinine on file in past 12 months     Recent Labs   Lab Test 01/21/20  0739  12/22/12  1838   CR 0.75   < >  --    CREAT  --   --  0.9    < > = values in this interval not displayed.           Passed - HgbA1C in past 3 or 6 months     If HgbA1C is 8 or greater, it needs to be on file within the past 3 months.  If less than 8, must be on file within the past 6 months.     Recent Labs   Lab Test 10/08/19  0842  02/01/18   A1C 6.4*   < >  --    HEMOGLOBINA1  --   --  5.6    < > = values in this interval not displayed.           Passed - Medication is active on med list        Passed - Patient is age 18 or older        Passed - Recent (6 mo) or future (30 days) visit within the authorizing provider's specialty     Patient had office visit in the last 6 months or has a visit in the next 30 days with authorizing provider or within the authorizing provider's specialty.  See \"Patient Info\" tab in inbasket, or \"Choose Columns\" in Meds & Orders section of the refill encounter.              "

## 2020-02-04 NOTE — TELEPHONE ENCOUNTER
Signed Prescriptions:                        Disp   Refills    insulin glargine (BASAGLAR KWIKPEN) 100 UN*30 mL  0        Sig: INJECT 26 UNITS SUBCUTANEOUS DAILY  Authorizing Provider: WEGENER, JOEL DANIEL IRWIN  Ordering User: BRENDAN ARRIAZA    Refused Prescriptions:                       Disp   Refills    rosuvastatin (CRESTOR) 20 MG tablet [Pharm*0.1 ta*0        Sig: TAKE 1 TABLET BY MOUTH EVERY DAY  Refused By: BRENDAN ARRIAZA  Reason for Refusal: Duplicate

## 2020-02-06 ENCOUNTER — ANCILLARY PROCEDURE (OUTPATIENT)
Dept: ULTRASOUND IMAGING | Facility: CLINIC | Age: 60
End: 2020-02-06
Attending: INTERNAL MEDICINE
Payer: MEDICARE

## 2020-02-06 DIAGNOSIS — K70.30 ALCOHOLIC CIRRHOSIS OF LIVER WITHOUT ASCITES (H): ICD-10-CM

## 2020-02-07 ENCOUNTER — TELEPHONE (OUTPATIENT)
Dept: FAMILY MEDICINE | Facility: CLINIC | Age: 60
End: 2020-02-07

## 2020-02-07 ENCOUNTER — MYC MEDICAL ADVICE (OUTPATIENT)
Dept: FAMILY MEDICINE | Facility: CLINIC | Age: 60
End: 2020-02-07

## 2020-02-07 NOTE — TELEPHONE ENCOUNTER
Panel Management Review      Patient has the following on his problem list:     Asthma review     ACT Total Scores 9/5/2019   ACT TOTAL SCORE -   ASTHMA ER VISITS -   ASTHMA HOSPITALIZATIONS -   ACT TOTAL SCORE (Goal Greater than or Equal to 20) 22   In the past 12 months, how many times did you visit the emergency room for your asthma without being admitted to the hospital? 0   In the past 12 months, how many times were you hospitalized overnight because of your asthma? 0      1. Is Asthma diagnosis on the Problem List? Yes    2. Is Asthma listed on Health Maintenance? Yes    3. Patient is due for:  ACT and AAP      Composite cancer screening  Chart review shows that this patient is due/due soon for the following None  Summary:    Patient is due/failing the following:   AAP, ACT, BP CHECK, PHQ9 and PHYSICAL    Action needed:   Patient needs office visit for    Health Maintenance Due   Topic Date Due     ADVANCE CARE PLANNING  1960     HIV SCREENING  10/13/1974     ZOSTER IMMUNIZATION (1 of 2) 10/13/2009     MEDICARE ANNUAL WELLNESS VISIT  04/26/2014     ASTHMA ACTION PLAN  09/17/2019     PHQ-9  10/02/2019     ASTHMA CONTROL TEST  02/25/2020       Type of outreach:    Phone, spoke to patient.  under alot of stress have to reschedule     Questions for provider review:    None                                                                                                                                    Ginger Epstein MA       Chart routed to Care Team .

## 2020-02-25 ENCOUNTER — MYC MEDICAL ADVICE (OUTPATIENT)
Dept: FAMILY MEDICINE | Facility: CLINIC | Age: 60
End: 2020-02-25

## 2020-02-25 DIAGNOSIS — R94.31 PROLONGED QT INTERVAL: Primary | ICD-10-CM

## 2020-02-25 DIAGNOSIS — R11.0 NAUSEA WITHOUT VOMITING: ICD-10-CM

## 2020-02-25 DIAGNOSIS — K31.84 GASTROPARESIS: ICD-10-CM

## 2020-02-25 DIAGNOSIS — E78.5 HYPERLIPIDEMIA LDL GOAL <100: ICD-10-CM

## 2020-02-25 NOTE — TELEPHONE ENCOUNTER
"Requested Prescriptions   Pending Prescriptions Disp Refills     rosuvastatin (CRESTOR) 20 MG tablet [Pharmacy Med Name: ROSUVASTATIN CALCIUM 20 MG TAB] 90 tablet 3     Sig: TAKE 1 TABLET BY MOUTH EVERY DAY  Last Written Prescription Date:  1/17/2019  Last Fill Quantity: 90tablet,  # refills: 3   Last Office Visit: 10/8/2019 Wegener  Future Office Visit:    Next 5 appointments (look out 90 days)    Mar 03, 2020  9:00 AM CST  Nurse Only with  MEDICAL ASSISTANT  [facility] [address] [place] [id number] [phone]                Statins Protocol Passed - 2/25/2020 10:49 AM        Passed - LDL on file in past 12 months     Recent Labs   Lab Test 04/02/19  0927   *             Passed - No abnormal creatine kinase in past 12 months     No lab results found.             Passed - Recent (12 mo) or future (30 days) visit within the authorizing provider's specialty     Patient has had an office visit with the authorizing provider or a provider within the authorizing providers department within the previous 12 mos or has a future within next 30 days. See \"Patient Info\" tab in inbasket, or \"Choose Columns\" in Meds & Orders section of the refill encounter.              Passed - Medication is active on med list        Passed - Patient is age 18 or older        promethazine (PHENERGAN) 25 MG tablet [Pharmacy Med Name: PROMETHAZINE 25 MG TABLET] 90 tablet 1     Sig: TAKE 1 TABLET (25 MG) BY MOUTH 3 TIMES DAILY AS NEEDED FOR NAUSEA  Last Written Prescription Date:  12/23/2019  Last Fill Quantity: 90tablet,  # refills: 1   Last Office Visit: 10/8/2019   Future Office Visit:    Next 5 appointments (look out 90 days)    Mar 03, 2020  9:00 AM CST  Nurse Only with  MEDICAL ASSISTANT  [facility]) [address] [place] [id number] [phone]                 Antivertigo/Antiemetic Agents Passed - " "2/25/2020 10:49 AM        Passed - Recent (12 mo) or future (30 days) visit within the authorizing provider's specialty     Patient has had an office visit with the authorizing provider or a provider within the authorizing providers department within the previous 12 mos or has a future within next 30 days. See \"Patient Info\" tab in inbasket, or \"Choose Columns\" in Meds & Orders section of the refill encounter.              Passed - Medication is active on med list        Passed - Patient is 18 years of age or older          "

## 2020-02-27 ENCOUNTER — MYC MEDICAL ADVICE (OUTPATIENT)
Dept: FAMILY MEDICINE | Facility: CLINIC | Age: 60
End: 2020-02-27

## 2020-02-27 RX ORDER — ROSUVASTATIN CALCIUM 20 MG/1
TABLET, COATED ORAL
Qty: 90 TABLET | Refills: 0 | Status: SHIPPED | OUTPATIENT
Start: 2020-02-27 | End: 2020-05-23

## 2020-02-27 RX ORDER — PROMETHAZINE HYDROCHLORIDE 25 MG/1
TABLET ORAL
Qty: 90 TABLET | Refills: 1 | Status: SHIPPED | OUTPATIENT
Start: 2020-02-27 | End: 2020-04-23

## 2020-02-28 NOTE — TELEPHONE ENCOUNTER
Patient meds were sent into pharmacy yesterday 2/27. Patient updated.      Thanks! Miriam Cole RN

## 2020-03-02 ENCOUNTER — MYC MEDICAL ADVICE (OUTPATIENT)
Dept: FAMILY MEDICINE | Facility: CLINIC | Age: 60
End: 2020-03-02

## 2020-03-02 NOTE — LETTER
[facility]  [address] [place] [id number] [phone]          March 3, 2020    Re: Erwin Aguilar                                                                                                                     [address]  [address]  [place] [address]            To Whom It May Concern:    Erwin Aguilar is a patient of mine who has a long-term disability.        Sincerely,         Joel Daniel Irwin Wegener MD/at

## 2020-03-02 NOTE — TELEPHONE ENCOUNTER
Dr. Wegener-Please review and sign if agree.    Letter pended.    Thank you!  FLORIN EscobarN, RN

## 2020-03-04 NOTE — TELEPHONE ENCOUNTER
Spoke with patient regarding letter and he said to mail to him so that is what I will do  Mailing letter

## 2020-03-06 ENCOUNTER — MYC MEDICAL ADVICE (OUTPATIENT)
Dept: FAMILY MEDICINE | Facility: CLINIC | Age: 60
End: 2020-03-06

## 2020-03-06 NOTE — TELEPHONE ENCOUNTER
Please see pt mychart. Pt has appt. On 3/9. Please advise if there anything else pt should be doing in the meantime.     Miriam Cole RN

## 2020-03-10 ENCOUNTER — OFFICE VISIT (OUTPATIENT)
Dept: ORTHOPEDICS | Facility: CLINIC | Age: 60
End: 2020-03-10
Payer: MEDICARE

## 2020-03-10 DIAGNOSIS — L97.512 DIABETIC ULCER OF TOE OF RIGHT FOOT ASSOCIATED WITH TYPE 2 DIABETES MELLITUS, WITH FAT LAYER EXPOSED (H): ICD-10-CM

## 2020-03-10 DIAGNOSIS — E11.42 TYPE 2 DIABETES MELLITUS WITH POLYNEUROPATHY (H): ICD-10-CM

## 2020-03-10 DIAGNOSIS — E11.621 DIABETIC ULCER OF TOE OF RIGHT FOOT ASSOCIATED WITH TYPE 2 DIABETES MELLITUS, WITH FAT LAYER EXPOSED (H): ICD-10-CM

## 2020-03-10 DIAGNOSIS — M76.61 ACHILLES TENDINITIS OF RIGHT LOWER EXTREMITY: Primary | ICD-10-CM

## 2020-03-10 NOTE — NURSING NOTE
Reason For Visit:   Chief Complaint   Patient presents with     Follow Up     Wound, right 5th toe. Redness, right great toe. Dry cracks, right heel.  Patient stated that the wound has been present for 5 weeks. Patient also stated that he believes his right drop foot has increased. Increased pain, right achilles.  Patient stated that his shoes and brace are several years old.        Pain Assessment  Patient Currently in Pain: Yes  Primary Pain Location: (Right achilles)        Allergies   Allergen Reactions     Lisinopril Anaphylaxis     Swollen tongue; inability to swallow; drooling; hives; swollen face, neck, angioedema     Acetaminophen      Hx of cirrhosis      Amlodipine      Swelling, hives, possible angioedema       Morphine      b/p dropped and arms went numb  Hypotension     Quinolones Hives     Bactrim [Sulfamethoxazole W/Trimethoprim] Rash     Levaquin Swelling and Rash     Swelling in lip and tongue felt thick           Heidi ZELDA wKan

## 2020-03-10 NOTE — LETTER
3/10/2020       RE: Erwin Aguilar  [address]  [address]  [place] [address]     Dear Colleague,    Thank you for referring your patient, Erwin Aguilar, to the [facility] at [facility]. Please see a copy of my visit note below.    Chief Complaint:   Chief Complaint   Patient presents with     Follow Up     Wound, right 5th toe. Redness, right great toe. Dry cracks, right heel.  Patient stated that the wound has been present for 5 weeks. Patient also stated that he believes his right drop foot has increased. Increased pain, right achilles.  Patient stated that his shoes and brace are several years old.           Allergies   Allergen Reactions     Lisinopril Anaphylaxis     Swollen tongue; inability to swallow; drooling; hives; swollen face, neck, angioedema     Acetaminophen      Hx of cirrhosis      Amlodipine      Swelling, hives, possible angioedema       Morphine      b/p dropped and arms went numb  Hypotension     Quinolones Hives     Bactrim [Sulfamethoxazole W/Trimethoprim] Rash     Levaquin Swelling and Rash     Swelling in lip and tongue felt thick         Subjective: Erwin is a 59 year old male who presents to the clinic today for a follow up of right 5th digit ulceration. He has had this for about 5 weeks. Relates that it was ribbing in the shoe. He has been putting Medihoney on the area. No pain to the foot.     Objective          A diabetic pressure wound is noted at right  5th dorsolateral digit measuring 0.2cm x 0.2cm x 0.1cm    Mix Classification: 2    Wound base: Pink/Granulation    Edges: crusty    Drainage: slight/serous    Odor: no    Undermining: no    Bone Exposure: No    Clinical Signs of Infection: No    After obtaining patient consent, the wound was irrigated with copious amounts of saline. A scalpel was then used to debride the wound into the dermal tissue. The wound edges were debrided to healthy, bleeding tissue. Given the  patient's lack of sensation, no anesthesia was necessary for the procedure.   Pain in the right Achilles tendon with decreased plantar and dorsiflexion of the right ankle. Does have known dropfoot on the right. Abnormal Wagner's test. No tendon voids noted.     Assessment: Erwin is a 58 YO type 2 diabetic with new right 5th digit ulceration and pain in the right Achilles tendon with known dropfoot. He does have a pain pump, and although he can get MRIs, will start with an US on the right Achilles tendon.       Plan:   - Pt seen and evaluated  - Wound debrided as described.   - Start daily dressing changes on the ulcer Cleanse and pat dry. Apply Iodofoam and Optifoam. Cover with  DSD.  - US for the right Achilles tendon.   - Pt to return to clinic in 2 weeks.     Victor M Anaya DPM

## 2020-03-11 NOTE — TELEPHONE ENCOUNTER
Yes, Pt can be referred because Dr. Victor M Anaya DPM is with [facility] at [address] [place] [address] Podiatry department.    Christine Galarza [facility] Referral Rep

## 2020-03-11 NOTE — PROGRESS NOTES
Chief Complaint:   Chief Complaint   Patient presents with     Follow Up     Wound, right 5th toe. Redness, right great toe. Dry cracks, right heel.  Patient stated that the wound has been present for 5 weeks. Patient also stated that he believes his right drop foot has increased. Increased pain, right achilles.  Patient stated that his shoes and brace are several years old.           Allergies   Allergen Reactions     Lisinopril Anaphylaxis     Swollen tongue; inability to swallow; drooling; hives; swollen face, neck, angioedema     Acetaminophen      Hx of cirrhosis      Amlodipine      Swelling, hives, possible angioedema       Morphine      b/p dropped and arms went numb  Hypotension     Quinolones Hives     Bactrim [Sulfamethoxazole W/Trimethoprim] Rash     Levaquin Swelling and Rash     Swelling in lip and tongue felt thick         Subjective: Erwin is a 59 year old male who presents to the clinic today for a follow up of right 5th digit ulceration. He has had this for about 5 weeks. Relates that it was ribbing in the shoe. He has been putting Medihoney on the area. No pain to the foot.     Objective          A diabetic pressure wound is noted at right  5th dorsolateral digit measuring 0.2cm x 0.2cm x 0.1cm    Mix Classification: 2    Wound base: Pink/Granulation    Edges: crusty    Drainage: slight/serous    Odor: no    Undermining: no    Bone Exposure: No    Clinical Signs of Infection: No    After obtaining patient consent, the wound was irrigated with copious amounts of saline. A scalpel was then used to debride the wound into the dermal tissue. The wound edges were debrided to healthy, bleeding tissue. Given the patient's lack of sensation, no anesthesia was necessary for the procedure.   Pain in the right Achilles tendon with decreased plantar and dorsiflexion of the right ankle. Does have known dropfoot on the right. Abnormal Wagner's test. No tendon voids noted.     Assessment: Erwin is a 60 YO  type 2 diabetic with new right 5th digit ulceration and pain in the right Achilles tendon with known dropfoot. He does have a pain pump, and although he can get MRIs, will start with an US on the right Achilles tendon.       Plan:   - Pt seen and evaluated  - Wound debrided as described.   - Start daily dressing changes on the ulcer Cleanse and pat dry. Apply Iodofoam and Optifoam. Cover with  DSD.  - US for the right Achilles tendon.   - Pt to return to clinic in 2 weeks.

## 2020-03-14 NOTE — TELEPHONE ENCOUNTER
"Requested Prescriptions   Pending Prescriptions Disp Refills     albuterol (PROAIR HFA/PROVENTIL HFA/VENTOLIN HFA) 108 (90 Base) MCG/ACT inhaler [Pharmacy Med Name: ALBUTEROL HFA (VENTOLIN) INH] 18 Inhaler 0     Sig: INHALE 2 PUFFS BY MOUTH EVERY 6 HOURS AS NEEDED FOR SHORTNESS OF BREATH/DYSPNEA OR WHEEZING  Last Written Prescription Date:  8/27/2019  Last Fill Quantity: 18,  # refills: 0   Last Office Visit: 4/2/2019   Future Office Visit:            Asthma Maintenance Inhalers - Anticholinergics Passed - 9/19/2019  1:52 AM        Passed - Patient is age 12 years or older        Passed - Asthma control assessment score within normal limits in last 6 months     Please review ACT score.   ACT Total Scores 10/10/2016 12/21/2018 9/5/2019   ACT TOTAL SCORE - - -   ASTHMA ER VISITS - - -   ASTHMA HOSPITALIZATIONS - - -   ACT TOTAL SCORE (Goal Greater than or Equal to 20) 17 20 22   In the past 12 months, how many times did you visit the emergency room for your asthma without being admitted to the hospital? 0 0 0   In the past 12 months, how many times were you hospitalized overnight because of your asthma? 0 0 0             Passed - Medication is active on med list        Passed - Recent (6 mo) or future (30 days) visit within the authorizing provider's specialty     Patient had office visit in the last 6 months or has a visit in the next 30 days with authorizing provider or within the authorizing provider's specialty.  See \"Patient Info\" tab in inbasket, or \"Choose Columns\" in Meds & Orders section of the refill encounter.              " no

## 2020-03-15 ENCOUNTER — HEALTH MAINTENANCE LETTER (OUTPATIENT)
Age: 60
End: 2020-03-15

## 2020-03-27 ENCOUNTER — VIRTUAL VISIT (OUTPATIENT)
Dept: ORTHOPEDICS | Facility: CLINIC | Age: 60
End: 2020-03-27
Attending: FAMILY MEDICINE
Payer: MEDICARE

## 2020-03-27 DIAGNOSIS — E11.42 TYPE 2 DIABETES MELLITUS WITH POLYNEUROPATHY (H): ICD-10-CM

## 2020-03-27 DIAGNOSIS — L97.512 DIABETIC ULCER OF TOE OF RIGHT FOOT ASSOCIATED WITH TYPE 2 DIABETES MELLITUS, WITH FAT LAYER EXPOSED (H): ICD-10-CM

## 2020-03-27 DIAGNOSIS — M76.61 ACHILLES TENDINITIS OF RIGHT LOWER EXTREMITY: Primary | ICD-10-CM

## 2020-03-27 DIAGNOSIS — E11.621 DIABETIC ULCER OF TOE OF RIGHT FOOT ASSOCIATED WITH TYPE 2 DIABETES MELLITUS, WITH FAT LAYER EXPOSED (H): ICD-10-CM

## 2020-03-27 ASSESSMENT — PATIENT HEALTH QUESTIONNAIRE - PHQ9: SUM OF ALL RESPONSES TO PHQ QUESTIONS 1-9: 0

## 2020-03-27 NOTE — NURSING NOTE
Reason For Visit:   Chief Complaint   Patient presents with     RECHECK     2 week follow up. Right 5th toe       There were no vitals taken for this visit.     pain in tendon 10/10  Pain in toe 6/10    Krys Egnlish LPN

## 2020-03-27 NOTE — PROGRESS NOTES
"Erwin Aguilar is a 59 year old male who is being evaluated via a billable telephone visit.      The patient has been notified of following:     \"This telephone visit will be conducted via a call between you and your physician/provider. We have found that certain health care needs can be provided without the need for a physical exam.  This service lets us provide the care you need with a short phone conversation.  If a prescription is necessary we can send it directly to your pharmacy.  If lab work is needed we can place an order for that and you can then stop by our lab to have the test done at a later time.    If during the course of the call the physician/provider feels a telephone visit is not appropriate, you will not be charged for this service.\"     Physician has received verbal consent for a Telephone Visit from the patient? Yes    Erwin Aguilar complains of    Chief Complaint   Patient presents with     RECHECK     2 week follow up. Right 5th toe       I have reviewed and updated the patient's Past Medical History, Social History, Family History and Medication List.    ALLERGIES  Lisinopril; Acetaminophen; Amlodipine; Morphine; Quinolones; Bactrim [sulfamethoxazole w/trimethoprim]; and Levaquin    Additional provider notes: Erwin relates that the right 5th toe is OK. He sent pictures via [employer]. He relates that the area does still drain some. His Iodofoam has dried out. Relates no pain to the toe, however he does still have Achilles tendonitis pain. He was not able to get the US 2/2 COVID precautions. Has not done any PT for the area.     Assessment/Plan:  1. Achilles tendinitis of right lower extremity  Will send him some stretching exercises via [employer]. US and PT after lifting of COVID precautions.   - diclofenac (VOLTAREN) 1 % topical gel; Place 2 g onto the skin 4 times daily  Dispense: 100 g; Refill: 4    2. Type 2 diabetes mellitus with polyneuropathy (H)  Cont diabetic cares.    3. Diabetic ulcer " of toe of right foot associated with type 2 diabetes mellitus, with fat layer exposed (H)  He can rehydrate the Iodofoam with NSS, which he has and then he can continue to use the foams and change these daily.       Phone call duration: 5 minutes    Victor M Anaya DPM

## 2020-04-16 ENCOUNTER — MYC MEDICAL ADVICE (OUTPATIENT)
Dept: FAMILY MEDICINE | Facility: CLINIC | Age: 60
End: 2020-04-16

## 2020-04-16 DIAGNOSIS — J45.40 MODERATE PERSISTENT ASTHMA WITHOUT COMPLICATION: ICD-10-CM

## 2020-04-17 RX ORDER — ALBUTEROL SULFATE 90 UG/1
AEROSOL, METERED RESPIRATORY (INHALATION)
Qty: 18 INHALER | Refills: 0 | Status: SHIPPED | OUTPATIENT
Start: 2020-04-17 | End: 2020-06-16

## 2020-04-17 NOTE — TELEPHONE ENCOUNTER
[employer]. Med refilled.    Please call and complete an ACT.    Thanks! Miriam Cole RN

## 2020-05-21 DIAGNOSIS — E78.5 HYPERLIPIDEMIA LDL GOAL <100: ICD-10-CM

## 2020-05-22 DIAGNOSIS — M62.830 BACK MUSCLE SPASM: ICD-10-CM

## 2020-05-22 DIAGNOSIS — I10 HYPERTENSION GOAL BP (BLOOD PRESSURE) < 140/90: ICD-10-CM

## 2020-05-22 DIAGNOSIS — M62.838 MUSCLE SPASMS OF BOTH LOWER EXTREMITIES: ICD-10-CM

## 2020-05-23 ENCOUNTER — MYC MEDICAL ADVICE (OUTPATIENT)
Dept: NURSING | Facility: CLINIC | Age: 60
End: 2020-05-23

## 2020-05-23 ENCOUNTER — MYC MEDICAL ADVICE (OUTPATIENT)
Dept: FAMILY MEDICINE | Facility: CLINIC | Age: 60
End: 2020-05-23

## 2020-05-23 RX ORDER — ROSUVASTATIN CALCIUM 20 MG/1
TABLET, COATED ORAL
Qty: 90 TABLET | Refills: 0 | Status: SHIPPED | OUTPATIENT
Start: 2020-05-23 | End: 2020-08-19

## 2020-05-23 NOTE — TELEPHONE ENCOUNTER
Medication is being filled for 1 time refill only due to:  Patient needs to be seen because needs diabetic recheck.   My chart sent.  Soraya Molina RN

## 2020-05-23 NOTE — TELEPHONE ENCOUNTER
Routing refill request to provider for review/approval because:  Drug not on the [facility] refill protocol       Last Written Prescription Date:  5/6/19  Last Fill Quantity: 180,  # refills: 11   Last office visit: 10/8/2019 with prescribing provider:     Future Office Visit:    Harriett Garcia RN

## 2020-05-23 NOTE — TELEPHONE ENCOUNTER
Dr Wegener - Please advise.  I did send the refill encounter to you as well.  Can Home care be ordered for lab draw in this instance?  Would insurance cover?  Harriett Garcia RN

## 2020-05-26 RX ORDER — BACLOFEN 10 MG/1
TABLET ORAL
Qty: 180 TABLET | Refills: 11 | Status: SHIPPED | OUTPATIENT
Start: 2020-05-26 | End: 2021-06-03

## 2020-05-26 NOTE — TELEPHONE ENCOUNTER
Sending high priority to PCP.    Would you want virtual appt. Please advise on lab and refill.  RODERICK Gu

## 2020-05-27 RX ORDER — HYDROCHLOROTHIAZIDE 25 MG/1
TABLET ORAL
Qty: 90 TABLET | Refills: 3 | Status: SHIPPED | OUTPATIENT
Start: 2020-05-27 | End: 2021-09-20

## 2020-05-27 NOTE — TELEPHONE ENCOUNTER
Routing refill request to provider for review/approval because:  Blood pressure above protocol range      Last Written Prescription Date:  4/4/19  Last Fill Quantity: 90,  # refills: 3   Last office visit: 10/8/2019 with prescribing provider:     Future Office Visit:

## 2020-05-27 NOTE — TELEPHONE ENCOUNTER
Called the patient and gave him the arrival time for his procedure on 1/17/18 with Dr Sauceda. Spinal Cord Stim.   Fluconazole Counseling:  Patient counseled regarding adverse effects of fluconazole including but not limited to headache, diarrhea, nausea, upset stomach, liver function test abnormalities, taste disturbance, and stomach pain.  There is a rare possibility of liver failure that can occur when taking fluconazole.  The patient understands that monitoring of LFTs and kidney function test may be required, especially at baseline. The patient verbalized understanding of the proper use and possible adverse effects of fluconazole.  All of the patient's questions and concerns were addressed.

## 2020-06-12 DIAGNOSIS — R11.0 NAUSEA: ICD-10-CM

## 2020-06-12 DIAGNOSIS — L30.9 ECZEMA, UNSPECIFIED TYPE: ICD-10-CM

## 2020-06-12 DIAGNOSIS — Z79.4 TYPE 2 DIABETES MELLITUS WITHOUT COMPLICATION, WITH LONG-TERM CURRENT USE OF INSULIN (H): ICD-10-CM

## 2020-06-12 DIAGNOSIS — E11.9 TYPE 2 DIABETES MELLITUS WITHOUT COMPLICATION, WITH LONG-TERM CURRENT USE OF INSULIN (H): ICD-10-CM

## 2020-06-12 DIAGNOSIS — E87.6 HYPOKALEMIA: ICD-10-CM

## 2020-06-12 DIAGNOSIS — J45.40 MODERATE PERSISTENT ASTHMA WITHOUT COMPLICATION: ICD-10-CM

## 2020-06-16 ENCOUNTER — MYC MEDICAL ADVICE (OUTPATIENT)
Dept: FAMILY MEDICINE | Facility: CLINIC | Age: 60
End: 2020-06-16

## 2020-06-16 DIAGNOSIS — I10 HYPERTENSION GOAL BP (BLOOD PRESSURE) < 140/90: ICD-10-CM

## 2020-06-16 DIAGNOSIS — E11.9 TYPE 2 DIABETES, HBA1C GOAL < 7% (H): Primary | ICD-10-CM

## 2020-06-16 DIAGNOSIS — E78.5 HYPERLIPIDEMIA LDL GOAL <100: ICD-10-CM

## 2020-06-16 RX ORDER — POTASSIUM CHLORIDE 1500 MG/1
20 TABLET, EXTENDED RELEASE ORAL DAILY
Qty: 90 TABLET | Refills: 3 | Status: SHIPPED | OUTPATIENT
Start: 2020-06-16 | End: 2021-09-14

## 2020-06-16 RX ORDER — ALBUTEROL SULFATE 90 UG/1
AEROSOL, METERED RESPIRATORY (INHALATION)
Qty: 3 INHALER | Refills: 3 | Status: SHIPPED | OUTPATIENT
Start: 2020-06-16 | End: 2021-06-09

## 2020-06-16 RX ORDER — TRIAMCINOLONE ACETONIDE 1 MG/G
OINTMENT TOPICAL
Qty: 30 G | Refills: 0 | Status: SHIPPED | OUTPATIENT
Start: 2020-06-16 | End: 2020-11-09

## 2020-06-16 RX ORDER — ONDANSETRON 4 MG/1
4 TABLET, FILM COATED ORAL EVERY 8 HOURS PRN
Qty: 90 TABLET | Refills: 1 | Status: SHIPPED | OUTPATIENT
Start: 2020-06-16 | End: 2020-06-22

## 2020-06-16 RX ORDER — PEN NEEDLE, DIABETIC 32GX 5/32"
NEEDLE, DISPOSABLE MISCELLANEOUS
Qty: 100 EACH | Refills: 3 | Status: SHIPPED | OUTPATIENT
Start: 2020-06-16 | End: 2020-10-19

## 2020-06-16 NOTE — TELEPHONE ENCOUNTER
Medication is being filled for 1 time refill only due to:  Patient needs to be seen because needs appt.   Sent pt my chart to schedule.  Soraya Molina RN

## 2020-06-17 ENCOUNTER — MYC MEDICAL ADVICE (OUTPATIENT)
Dept: FAMILY MEDICINE | Facility: CLINIC | Age: 60
End: 2020-06-17

## 2020-06-17 DIAGNOSIS — E11.9 TYPE 2 DIABETES MELLITUS WITHOUT COMPLICATION, WITH LONG-TERM CURRENT USE OF INSULIN (H): ICD-10-CM

## 2020-06-17 DIAGNOSIS — I10 HYPERTENSION GOAL BP (BLOOD PRESSURE) < 140/90: ICD-10-CM

## 2020-06-17 DIAGNOSIS — Z79.4 TYPE 2 DIABETES MELLITUS WITHOUT COMPLICATION, WITH LONG-TERM CURRENT USE OF INSULIN (H): ICD-10-CM

## 2020-06-17 RX ORDER — PROPRANOLOL HYDROCHLORIDE 40 MG/1
80 TABLET ORAL 2 TIMES DAILY
Qty: 360 TABLET | Refills: 3 | Status: SHIPPED | OUTPATIENT
Start: 2020-06-17 | End: 2020-08-31

## 2020-06-17 RX ORDER — INSULIN GLARGINE 100 [IU]/ML
26 INJECTION, SOLUTION SUBCUTANEOUS DAILY
Qty: 23.4 ML | Refills: 3 | Status: SHIPPED | OUTPATIENT
Start: 2020-06-17 | End: 2021-01-11

## 2020-06-18 NOTE — TELEPHONE ENCOUNTER
Hydrochlorothiazide refilled on 5/27/20 for #90, 3 refills.    Ondansetron refilled on 6/16/20 for #90, 1 refill.    Writer responded as per below.  FLORIN EscobarN, RN

## 2020-06-22 DIAGNOSIS — R11.0 NAUSEA: ICD-10-CM

## 2020-06-22 RX ORDER — ONDANSETRON 4 MG/1
4 TABLET, FILM COATED ORAL EVERY 8 HOURS PRN
Qty: 90 TABLET | Refills: 1 | Status: SHIPPED | OUTPATIENT
Start: 2020-06-22 | End: 2020-06-26

## 2020-06-23 ENCOUNTER — TELEPHONE (OUTPATIENT)
Dept: FAMILY MEDICINE | Facility: CLINIC | Age: 60
End: 2020-06-23

## 2020-06-23 DIAGNOSIS — R11.0 NAUSEA: ICD-10-CM

## 2020-06-23 DIAGNOSIS — K31.84 GASTROPARESIS: Primary | ICD-10-CM

## 2020-06-23 NOTE — TELEPHONE ENCOUNTER
PRIOR AUTHORIZATION DENIED    Medication: ondansetron (ZOFRAN) 4 MG tablet - DENIED    Denial Date: 6/23/2020    Denial Rational: DIAGNOSIS IS NOT COVERED BY INS - INS ONLY COVERS FOR CHEMO/NAUSEA      Appeal Information:

## 2020-06-25 ENCOUNTER — TELEPHONE (OUTPATIENT)
Dept: WOUND CARE | Facility: CLINIC | Age: 60
End: 2020-06-25

## 2020-06-25 NOTE — TELEPHONE ENCOUNTER
Phoned patient to offer an appointment with Breanna our wound nurse next week per Dr. Anaya. Unable to leave message.     NEO Stroud NREMT

## 2020-06-26 RX ORDER — ONDANSETRON 4 MG/1
4 TABLET, FILM COATED ORAL EVERY 8 HOURS PRN
Qty: 90 TABLET | Refills: 1 | Status: SHIPPED | OUTPATIENT
Start: 2020-06-26 | End: 2020-10-09

## 2020-07-06 ENCOUNTER — MYC MEDICAL ADVICE (OUTPATIENT)
Dept: ORTHOPEDICS | Facility: CLINIC | Age: 60
End: 2020-07-06

## 2020-07-15 ENCOUNTER — MYC MEDICAL ADVICE (OUTPATIENT)
Dept: GASTROENTEROLOGY | Facility: CLINIC | Age: 60
End: 2020-07-15

## 2020-07-15 DIAGNOSIS — K70.30 ALCOHOLIC CIRRHOSIS OF LIVER WITHOUT ASCITES (H): Primary | ICD-10-CM

## 2020-07-20 DIAGNOSIS — I10 HYPERTENSION GOAL BP (BLOOD PRESSURE) < 140/90: ICD-10-CM

## 2020-07-20 DIAGNOSIS — K70.30 ALCOHOLIC CIRRHOSIS OF LIVER WITHOUT ASCITES (H): ICD-10-CM

## 2020-07-20 DIAGNOSIS — E78.5 HYPERLIPIDEMIA LDL GOAL <100: ICD-10-CM

## 2020-07-20 DIAGNOSIS — E11.9 TYPE 2 DIABETES, HBA1C GOAL < 7% (H): ICD-10-CM

## 2020-07-20 LAB
ALBUMIN SERPL-MCNC: 3.5 G/DL (ref 3.4–5)
ALP SERPL-CCNC: 100 U/L (ref 40–150)
ALT SERPL W P-5'-P-CCNC: 25 U/L (ref 0–70)
ANION GAP SERPL CALCULATED.3IONS-SCNC: 6 MMOL/L (ref 3–14)
AST SERPL W P-5'-P-CCNC: 14 U/L (ref 0–45)
BILIRUB SERPL-MCNC: 0.4 MG/DL (ref 0.2–1.3)
BUN SERPL-MCNC: 11 MG/DL (ref 7–30)
CALCIUM SERPL-MCNC: 8.6 MG/DL (ref 8.5–10.1)
CHLORIDE SERPL-SCNC: 105 MMOL/L (ref 94–109)
CHOLEST SERPL-MCNC: 242 MG/DL
CO2 SERPL-SCNC: 27 MMOL/L (ref 20–32)
CREAT SERPL-MCNC: 0.64 MG/DL (ref 0.66–1.25)
CREAT UR-MCNC: 83 MG/DL
ERYTHROCYTE [DISTWIDTH] IN BLOOD BY AUTOMATED COUNT: 12.2 % (ref 10–15)
GFR SERPL CREATININE-BSD FRML MDRD: >90 ML/MIN/{1.73_M2}
GLUCOSE SERPL-MCNC: 151 MG/DL (ref 70–99)
HBA1C MFR BLD: 5.8 % (ref 0–5.6)
HCT VFR BLD AUTO: 39.2 % (ref 40–53)
HDLC SERPL-MCNC: 41 MG/DL
HGB BLD-MCNC: 13.4 G/DL (ref 13.3–17.7)
INR PPP: 1.26 (ref 0.86–1.14)
LDLC SERPL CALC-MCNC: 172 MG/DL
MCH RBC QN AUTO: 30.9 PG (ref 26.5–33)
MCHC RBC AUTO-ENTMCNC: 34.2 G/DL (ref 31.5–36.5)
MCV RBC AUTO: 90 FL (ref 78–100)
MICROALBUMIN UR-MCNC: <5 MG/L
MICROALBUMIN/CREAT UR: NORMAL MG/G CR (ref 0–17)
NONHDLC SERPL-MCNC: 201 MG/DL
PLATELET # BLD AUTO: 181 10E9/L (ref 150–450)
POTASSIUM SERPL-SCNC: 3.5 MMOL/L (ref 3.4–5.3)
PROT SERPL-MCNC: 7.6 G/DL (ref 6.8–8.8)
RBC # BLD AUTO: 4.34 10E12/L (ref 4.4–5.9)
SODIUM SERPL-SCNC: 138 MMOL/L (ref 133–144)
TRIGL SERPL-MCNC: 143 MG/DL
WBC # BLD AUTO: 7 10E9/L (ref 4–11)

## 2020-07-20 PROCEDURE — 83036 HEMOGLOBIN GLYCOSYLATED A1C: CPT | Performed by: FAMILY MEDICINE

## 2020-07-20 PROCEDURE — 85610 PROTHROMBIN TIME: CPT | Performed by: FAMILY MEDICINE

## 2020-07-20 PROCEDURE — 82043 UR ALBUMIN QUANTITATIVE: CPT | Performed by: FAMILY MEDICINE

## 2020-07-20 PROCEDURE — 36415 COLL VENOUS BLD VENIPUNCTURE: CPT | Performed by: INTERNAL MEDICINE

## 2020-07-20 PROCEDURE — 80053 COMPREHEN METABOLIC PANEL: CPT | Performed by: FAMILY MEDICINE

## 2020-07-20 PROCEDURE — 80061 LIPID PANEL: CPT | Performed by: INTERNAL MEDICINE

## 2020-07-20 PROCEDURE — 85027 COMPLETE CBC AUTOMATED: CPT | Performed by: FAMILY MEDICINE

## 2020-07-21 ENCOUNTER — DOCUMENTATION ONLY (OUTPATIENT)
Dept: CARE COORDINATION | Facility: CLINIC | Age: 60
End: 2020-07-21

## 2020-07-21 ENCOUNTER — VIRTUAL VISIT (OUTPATIENT)
Dept: GASTROENTEROLOGY | Facility: CLINIC | Age: 60
End: 2020-07-21
Attending: INTERNAL MEDICINE
Payer: MEDICARE

## 2020-07-21 DIAGNOSIS — K70.30 ALCOHOLIC CIRRHOSIS OF LIVER WITHOUT ASCITES (H): Primary | ICD-10-CM

## 2020-07-21 NOTE — PROGRESS NOTES
"Erwin Aguilar is a 59 year old male who is being evaluated via a billable telephone visit.      The patient has been notified of following:     \"This telephone visit will be conducted via a call between you and your physician/provider. We have found that certain health care needs can be provided without the need for a physical exam.  This service lets us provide the care you need with a short phone conversation.  If a prescription is necessary we can send it directly to your pharmacy.  If lab work is needed we can place an order for that and you can then stop by our lab to have the test done at a later time.    Telephone visits are billed at different rates depending on your insurance coverage. During this emergency period, for some insurers they may be billed the same as an in-person visit.  Please reach out to your insurance provider with any questions.    If during the course of the call the physician/provider feels a telephone visit is not appropriate, you will not be charged for this service.\"    Patient has given verbal consent for Telephone visit?  Yes    What phone number would you like to be contacted at? [phone]    How would you like to obtain your AVS? Pancho    Phone call duration:  18  minutes    Kimberly Ramirez MD    [facility]    Hepatology follow-up    SUBJECTIVE:  Alcoholic liver disease.  Mr. Aguilar is a 59-year-old male whom I have followed here for a number of years.  He carries a diagnosis of alcoholic cirrhosis.  When he initially presented with his liver disease, he did have hematemesis and was in the emergency room and at that time esophageal varices were seen and banded.  He did not have any further bleeding and he abstained from alcoholic beverages since then.  He is over 5 years now that he is alcohol free.  He did have a very low MELD score.  His platelets were quasi normal and liver function tests were normal the last time we repeated it here.  " He also denies any edema, does not have any ascites.  Denies any gastrointestinal bleeding like melena, hematemesis or hematochezia.  He denies any easy bruising.  He does not have any confusion or memory issues.  He has no sleep cycle disturbances and is not currently on lactulose.  He did lose intentionally weight, like 10 pounds lately.  He does not have any fever.  He does not have any shortness of breath, cough or chest pain.  He is on no new medications and has been admitted to any hospital recently.     Medical hx Surgical hx   Past Medical History:   Diagnosis Date     Actinic keratosis     aldara     Anxiety      Cancer (H)     squamous cell skin CA     Cauda equina spinal cord injury (H)      Chronic sinusitis 5-1-16     Diastasis recti      Esophageal reflux      Esophageal varices in cirrhosis (H) 4/1/2014    Hospitalized for UGI blee 3/28/14, endoscopy revealed bleeding varices.     Essential hypertension, benign      Intermittent asthma      Mild depression (H)      Mixed hyperlipidemia      Nasal polyps 5-1-16     Other chronic pain      SCCA (squamous cell carcinoma) of skin      Seasonal allergic conjunctivitis      Type II or unspecified type diabetes mellitus without mention of complication, not stated as uncontrolled      Unspecified site of spinal cord injury without evidence of spinal bone injury     due to back surgery      Past Surgical History:   Procedure Laterality Date     BACK SURGERY  August 2009     C APPENDECTOMY  1974     COLONOSCOPY N/A 5/12/2016    Procedure: COMBINED COLONOSCOPY, SINGLE OR MULTIPLE BIOPSY/POLYPECTOMY BY BIOPSY;  Surgeon: Ana Paula Urbina MD;  Location:  GI     ENDOSCOPY UPPER, COLONOSCOPY, COMBINED  10/19/2011    Procedure:COMBINED ENDOSCOPY UPPER, COLONOSCOPY; Upper Endoscopy, Colonoscopy with Polypectomy x4; Surgeon:AMBAR RODRÍGUEZ; [facility]     ENT SURGERY  1-2016    Ongoing since then     ESOPHAGOSCOPY, GASTROSCOPY, DUODENOSCOPY  (EGD), COMBINED  3/28/2014    Procedure: COMBINED ESOPHAGOSCOPY, GASTROSCOPY, DUODENOSCOPY (EGD);  EGD, Gastric Biopsies, Esophageal Banding;  Surgeon: Reyna Tovar MD;  [facility]     ESOPHAGOSCOPY, GASTROSCOPY, DUODENOSCOPY (EGD), COMBINED  6/9/2014    Procedure: COMBINED ESOPHAGOSCOPY, GASTROSCOPY, DUODENOSCOPY (EGD);  Surgeon: Curtis Mendez MD;  [facility]: [facility] GI     ESOPHAGOSCOPY, GASTROSCOPY, DUODENOSCOPY (EGD), COMBINED  7/24/2014    Procedure: COMBINED ESOPHAGOSCOPY, GASTROSCOPY, DUODENOSCOPY (EGD);  Surgeon: Gerard Samaniego MD;  [facility]     ESOPHAGOSCOPY, GASTROSCOPY, DUODENOSCOPY (EGD), COMBINED N/A 10/31/2014    Procedure: COMBINED ESOPHAGOSCOPY, GASTROSCOPY, DUODENOSCOPY (EGD);  Surgeon: Gerard Samaniego MD;  [facility]     ESOPHAGOSCOPY, GASTROSCOPY, DUODENOSCOPY (EGD), COMBINED N/A 5/12/2016    Procedure: COMBINED ESOPHAGOSCOPY, GASTROSCOPY, DUODENOSCOPY (EGD);  Surgeon: Ana Palua Urbina MD;  [facility]     ESOPHAGOSCOPY, GASTROSCOPY, DUODENOSCOPY (EGD), COMBINED N/A 8/2/2018    Procedure: COMBINED ESOPHAGOSCOPY, GASTROSCOPY, DUODENOSCOPY (EGD);  EGD;  Surgeon: Yu Wagner MD;  [facility]     HCL SQUAMOUS CELL CARCINOMA AG  10/07    left forearm     HERNIORRHAPHY UMBILICAL  11/8/2012    Procedure: HERNIORRHAPHY UMBILICAL;  Open Umbilical Hernia Repair With Mesh ;  Surgeon: Chase Nicholson MD;  [facility]     INSERT STIMULATOR DORSAL COLUMN N/A 6/28/2018    Procedure: INSERT STIMULATOR DORSAL COLUMN;;  Surgeon: Elvis Sauceda MD;  [facility]     neuro stimulator  2010     REMOVE GENERATOR STIMULATOR (LOCATION) N/A 6/28/2018    Procedure: REMOVE GENERATOR STIMULATOR (LOCATION);  Spinal Cord Stimulator and IPG Explant and Re-Implant of SCS System (Leads and IPG);  Surgeon: Elvis Sauceda MD;  [facility]     SURGICAL HISTORY OF -   1/02    abcess tooth     SURGICAL HISTORY  OF -   1999    L4-5 laminectomy, cauda equina syndrome     SURGICAL HISTORY OF -   +    herniated disk repair     TONSILLECTOMY  10 1964     TRANSPOSITION ULNAR NERVE (ELBOW)      right          Medications  Prior to Admission medications    Medication Sig Start Date End Date Taking? Authorizing Provider   ACE/ARB NOT PRESCRIBED, INTENTIONAL, ACE & ARB not prescribed due to Allergy 8/2/12  Yes Ksenia Bean APRN CNP   albuterol (PROAIR HFA/PROVENTIL HFA/VENTOLIN HFA) 108 (90 Base) MCG/ACT inhaler INHALE 2 PUFFS BY MOUTH EVERY 6 HOURS AS NEEDED FOR SHORTNESS OF BREATH/DYSPNEA OR WHEEZING 6/16/20  Yes Wegener, Joel Daniel Irwin, MD   ammonium lactate (LAC-HYDRIN) 12 % cream Apply topically 2 times daily as needed for dry skin 11/1/17  Yes Victor M Anaya DPM   baclofen (LIORESAL) 10 MG tablet TAKE 2 TABLETS BY MOUTH THREE TIMES DAILY 5/26/20  Yes Wegener, Joel Daniel Irwin, MD   blood glucose (ACCU-CHEK KARISHMA PLUS) test strip USE TO TEST BLOOD SUGARS ONCE DAILY 9/5/19  Yes Wegener, Joel Daniel Irwin, MD   blood glucose monitoring (NO BRAND SPECIFIED) test strip Use to test blood sugars one time daily or as directed    Accucheck Richards plus test strips 10/6/16  Yes Wegener, Joel Daniel Irwin, MD   blood glucose monitoring (SOFTCLIX) lancets Use to test blood sugar 1 time daily or as directed. 2/22/18  Yes Wegener, Joel Daniel Irwin, MD   diclofenac (VOLTAREN) 1 % topical gel Place 2 g onto the skin 4 times daily 3/27/20  Yes Victor M Anaya DPM   hydrochlorothiazide (HYDRODIURIL) 25 MG tablet TAKE 1 TABLET (25 MG) BY MOUTH DAILY 5/27/20  Yes Wegener, Joel Daniel Irwin, MD   insulin glargine (BASAGLAR KWIKPEN) 100 UNIT/ML pen Inject 26 Units Subcutaneous daily 6/17/20 9/15/20 Yes Wegener, Joel Daniel Irwin, MD   insulin pen needle (BD ROSE U/F) 32G X 4 MM miscellaneous Use daily and as directed. 6/16/20  Yes Wegener, Joel Daniel Irwin, MD   mupirocin (BACTROBAN) 2 % ointment Apply to anterior  nares BID X 2 month supply  Patient taking differently: Apply topically as needed Apply to anterior nares BID X 2 month supply 2/16/17  Yes Antonio Chávez MD   potassium chloride ER (K-TAB) 20 MEQ CR tablet TAKE 1 TABLET (20 MEQ) BY MOUTH DAILY 6/16/20  Yes Wegener, Joel Daniel Irwin, MD   promethazine (PHENERGAN) 25 MG tablet TAKE 1 TABLET (25 MG) BY MOUTH 3 TIMES DAILY AS NEEDED FOR NAUSEA 7/6/20  Yes Wegener, Joel Daniel Irwin, MD   propranolol (INDERAL) 40 MG tablet Take 2 tablets (80 mg) by mouth 2 times daily 6/17/20  Yes Wegener, Joel Daniel Irwin, MD   rosuvastatin (CRESTOR) 20 MG tablet TAKE 1 TABLET BY MOUTH EVERY DAY 5/23/20  Yes Wegener, Joel Daniel Irwin, MD   sertraline (ZOLOFT) 100 MG tablet TAKE 2 TABLETS BY MOUTH EVERY DAY 10/8/19  Yes Wegener, Joel Daniel Irwin, MD   STATIN NOT PRESCRIBED, INTENTIONAL, Please choose reason not prescribed, below 9/17/18  Yes Wegener, Joel Daniel Irwin, MD   Syringe, Disposable, 25 ML MISC Syringe to be used for nasal irrigation 8/19/16  Yes Antonio Chávez MD   triamcinolone (KENALOG) 0.1 % external ointment APPLY SPARINGLY TO AFFECTED AREA THREE TIMES DAILY FOR 14 DAYS. 6/16/20  Yes Sonia Garcia MD   ondansetron (ZOFRAN) 4 MG tablet Take 1 tablet (4 mg) by mouth every 8 hours as needed for nausea  Patient not taking: Reported on 7/21/2020 6/26/20   Wegener, Joel Daniel Irwin, MD       Allergies  Allergies   Allergen Reactions     Lisinopril Anaphylaxis     Swollen tongue; inability to swallow; drooling; hives; swollen face, neck, angioedema     Acetaminophen      Hx of cirrhosis      Amlodipine      Swelling, hives, possible angioedema       Morphine      b/p dropped and arms went numb  Hypotension     Quinolones Hives     Bactrim [Sulfamethoxazole W/Trimethoprim] Rash     Levaquin Swelling and Rash     Swelling in lip and tongue felt thick       Review of systems  A 10-point review of systems was negative    Examination  There were no  vitals taken for this visit.  There is no height or weight on file to calculate BMI.    Gen- well, NAD, A+Ox3, normal color  Extr- hands normal, no EVA  Skin- no rash or jaundice  Psych- normal mood    Laboratory  Lab Results   Component Value Date     07/20/2020    POTASSIUM 3.5 07/20/2020    CHLORIDE 105 07/20/2020    CO2 27 07/20/2020    BUN 11 07/20/2020    CR 0.64 07/20/2020       Lab Results   Component Value Date    BILITOTAL 0.4 07/20/2020    ALT 25 07/20/2020    AST 14 07/20/2020    ALKPHOS 100 07/20/2020       Lab Results   Component Value Date    ALBUMIN 3.5 07/20/2020    PROTTOTAL 7.6 07/20/2020        Lab Results   Component Value Date    WBC 7.0 07/20/2020    HGB 13.4 07/20/2020    MCV 90 07/20/2020     07/20/2020       Lab Results   Component Value Date    INR 1.26 07/20/2020     MELD-Na score: 9 at 7/20/2020  8:10 AM  MELD score: 9 at 7/20/2020  8:10 AM  Calculated from:  Serum Creatinine: 0.64 mg/dL (Rounded to 1 mg/dL) at 7/20/2020  8:10 AM  Serum Sodium: 138 mmol/L (Rounded to 137 mmol/L) at 7/20/2020  8:10 AM  Total Bilirubin: 0.4 mg/dL (Rounded to 1 mg/dL) at 7/20/2020  8:10 AM  INR(ratio): 1.26 at 7/20/2020  8:10 AM  Age: 59 years 9 months    Radiology    ASSESSMENT AND PLAN:  Mr. Aguilar is a 59-year-old male with alcohol-related chronic liver disease.  As he abstained from alcoholic beverages he did improve and he does not have any more ascites nor did he have any gastrointestinal bleeding.  His last endoscopy was done here in 08/2018 and at that time, there were no esophageal varices.  Please note that his first presentation when he was initially diagnosed with liver disease was gastrointestinal bleed related with esophageal varices, so we advised him to continue abstaining from alcoholic beverages.  He will continue having surveillance for HCC and he will be seen here every 6 months.  For his other medical issues, he will follow with his PCP.     Kimberly Ramirez,  MD  Hepatology  [facility]

## 2020-07-21 NOTE — LETTER
7/21/2020         RE: Erwin Aguilar  [address]  [address]  [place] [address]        Dear Colleague,    Thank you for referring your patient, Erwin Aguilar, to the [facility] HEPAT[facility]. Please see a copy of my visit note below.    Erwin Aguilar is a 59 year old male who is being evaluated via a billable telephone visit.      Phone call duration:  18  minutes    Kimberly Ramirez MD    [facility]    Hepatology follow-up    SUBJECTIVE:  Alcoholic liver disease.  Mr. Aguilar is a 59-year-old male whom I have followed here for a number of years.  He carries a diagnosis of alcoholic cirrhosis.  When he initially presented with his liver disease, he did have hematemesis and was in the emergency room and at that time esophageal varices were seen and banded.  He did not have any further bleeding and he abstained from alcoholic beverages since then.  He is over 5 years now that he is alcohol free.  He did have a very low MELD score.  His platelets were quasi normal and liver function tests were normal the last time we repeated it here.  He also denies any edema, does not have any ascites.  Denies any gastrointestinal bleeding like melena, hematemesis or hematochezia.  He denies any easy bruising.  He does not have any confusion or memory issues.  He has no sleep cycle disturbances and is not currently on lactulose.  He did lose intentionally weight, like 10 pounds lately.  He does not have any fever.  He does not have any shortness of breath, cough or chest pain.  He is on no new medications and has been admitted to any hospital recently.     Medical hx Surgical hx   Past Medical History:   Diagnosis Date     Actinic keratosis     aldara     Anxiety      Cancer (H)     squamous cell skin CA     Cauda equina spinal cord injury (H)      Chronic sinusitis 5-1-16     Diastasis recti      Esophageal reflux      Esophageal varices in cirrhosis (H) 4/1/2014    Hospitalized  for UGI blee 3/28/14, endoscopy revealed bleeding varices.     Essential hypertension, benign      Intermittent asthma      Mild depression (H)      Mixed hyperlipidemia      Nasal polyps 5-1-16     Other chronic pain      SCCA (squamous cell carcinoma) of skin      Seasonal allergic conjunctivitis      Type II or unspecified type diabetes mellitus without mention of complication, not stated as uncontrolled      Unspecified site of spinal cord injury without evidence of spinal bone injury     due to back surgery      Past Surgical History:   Procedure Laterality Date     BACK SURGERY  August 2009     C APPENDECTOMY  1974     COLONOSCOPY N/A 5/12/2016    Procedure: COMBINED COLONOSCOPY, SINGLE OR MULTIPLE BIOPSY/POLYPECTOMY BY BIOPSY;  Surgeon: Ana Paula Urbina MD;  Location:  GI     ENDOSCOPY UPPER, COLONOSCOPY, COMBINED  10/19/2011    Procedure:COMBINED ENDOSCOPY UPPER, COLONOSCOPY; Upper Endoscopy, Colonoscopy with Polypectomy x4; Surgeon:AMBAR RODRÍGUEZIQ; Location: OR     ENT SURGERY  1-2016    Ongoing since then     ESOPHAGOSCOPY, GASTROSCOPY, DUODENOSCOPY (EGD), COMBINED  3/28/2014    Procedure: COMBINED ESOPHAGOSCOPY, GASTROSCOPY, DUODENOSCOPY (EGD);  EGD, Gastric Biopsies, Esophageal Banding;  Surgeon: Reyna Tovar MD;  Location:  OR     ESOPHAGOSCOPY, GASTROSCOPY, DUODENOSCOPY (EGD), COMBINED  6/9/2014    Procedure: COMBINED ESOPHAGOSCOPY, GASTROSCOPY, DUODENOSCOPY (EGD);  Surgeon: Curtis Mendez MD;  Location:  GI     ESOPHAGOSCOPY, GASTROSCOPY, DUODENOSCOPY (EGD), COMBINED  7/24/2014    Procedure: COMBINED ESOPHAGOSCOPY, GASTROSCOPY, DUODENOSCOPY (EGD);  Surgeon: Gerard Samaniego MD;  Location:  OR     ESOPHAGOSCOPY, GASTROSCOPY, DUODENOSCOPY (EGD), COMBINED N/A 10/31/2014    Procedure: COMBINED ESOPHAGOSCOPY, GASTROSCOPY, DUODENOSCOPY (EGD);  Surgeon: Gerard Samaniego MD;  Location:  OR     ESOPHAGOSCOPY, GASTROSCOPY, DUODENOSCOPY (EGD), COMBINED N/A 5/12/2016     Procedure: COMBINED ESOPHAGOSCOPY, GASTROSCOPY, DUODENOSCOPY (EGD);  Surgeon: Ana Paula Urbina MD;  [facility]     ESOPHAGOSCOPY, GASTROSCOPY, DUODENOSCOPY (EGD), COMBINED N/A 8/2/2018    Procedure: COMBINED ESOPHAGOSCOPY, GASTROSCOPY, DUODENOSCOPY (EGD);  EGD;  Surgeon: Yu Wagner MD;  [facility]     HCL SQUAMOUS CELL CARCINOMA AG  10/07    left forearm     HERNIORRHAPHY UMBILICAL  11/8/2012    Procedure: HERNIORRHAPHY UMBILICAL;  Open Umbilical Hernia Repair With Mesh ;  Surgeon: Chase Nicholson MD;  [facility]     INSERT STIMULATOR DORSAL COLUMN N/A 6/28/2018    Procedure: INSERT STIMULATOR DORSAL COLUMN;;  Surgeon: Elvis Sauceda MD;  [facility]     neuro stimulator  2010     REMOVE GENERATOR STIMULATOR (LOCATION) N/A 6/28/2018    Procedure: REMOVE GENERATOR STIMULATOR (LOCATION);  Spinal Cord Stimulator and IPG Explant and Re-Implant of SCS System (Leads and IPG);  Surgeon: Elvis Sauceda MD;  [facility]     SURGICAL HISTORY OF -   1/02    abcess tooth     SURGICAL HISTORY OF -   1999    L4-5 laminectomy, cauda equina syndrome     SURGICAL HISTORY OF -   +    herniated disk repair     TONSILLECTOMY  10 1964     TRANSPOSITION ULNAR NERVE (ELBOW)      right          Medications  Prior to Admission medications    Medication Sig Start Date End Date Taking? Authorizing Provider   ACE/ARB NOT PRESCRIBED, INTENTIONAL, ACE & ARB not prescribed due to Allergy 8/2/12  Yes Ksenia Bean APRN CNP   albuterol (PROAIR HFA/PROVENTIL HFA/VENTOLIN HFA) 108 (90 Base) MCG/ACT inhaler INHALE 2 PUFFS BY MOUTH EVERY 6 HOURS AS NEEDED FOR SHORTNESS OF BREATH/DYSPNEA OR WHEEZING 6/16/20  Yes Wegener, Joel Daniel Irwin, MD   ammonium lactate (LAC-HYDRIN) 12 % cream Apply topically 2 times daily as needed for dry skin 11/1/17  Yes Victor M Aanya DPM   baclofen (LIORESAL) 10 MG tablet TAKE 2 TABLETS BY MOUTH THREE  TIMES DAILY 5/26/20  Yes Wegener, Joel Daniel Irwin, MD   blood glucose (ACCU-CHEK KARISHMA PLUS) test strip USE TO TEST BLOOD SUGARS ONCE DAILY 9/5/19  Yes Wegener, Joel Daniel Irwin, MD   blood glucose monitoring (NO BRAND SPECIFIED) test strip Use to test blood sugars one time daily or as directed    Accucheck Richards plus test strips 10/6/16  Yes Wegener, Joel Daniel Irwin, MD   blood glucose monitoring (SOFTCLIX) lancets Use to test blood sugar 1 time daily or as directed. 2/22/18  Yes Wegener, Joel Daniel Irwin, MD   diclofenac (VOLTAREN) 1 % topical gel Place 2 g onto the skin 4 times daily 3/27/20  Yes Victor M Anaya DPM   hydrochlorothiazide (HYDRODIURIL) 25 MG tablet TAKE 1 TABLET (25 MG) BY MOUTH DAILY 5/27/20  Yes Wegener, Joel Daniel Irwin, MD   insulin glargine (BASAGLAR KWIKPEN) 100 UNIT/ML pen Inject 26 Units Subcutaneous daily 6/17/20 9/15/20 Yes Wegener, Joel Daniel Irwin, MD   insulin pen needle (BD ROSE U/F) 32G X 4 MM miscellaneous Use daily and as directed. 6/16/20  Yes Wegener, Joel Daniel Irwin, MD   mupirocin (BACTROBAN) 2 % ointment Apply to anterior nares BID X 2 month supply  Patient taking differently: Apply topically as needed Apply to anterior nares BID X 2 month supply 2/16/17  Yes Antonio Chávez MD   potassium chloride ER (K-TAB) 20 MEQ CR tablet TAKE 1 TABLET (20 MEQ) BY MOUTH DAILY 6/16/20  Yes Wegener, Joel Daniel Irwin, MD   promethazine (PHENERGAN) 25 MG tablet TAKE 1 TABLET (25 MG) BY MOUTH 3 TIMES DAILY AS NEEDED FOR NAUSEA 7/6/20  Yes Wegener, Joel Daniel Irwin, MD   propranolol (INDERAL) 40 MG tablet Take 2 tablets (80 mg) by mouth 2 times daily 6/17/20  Yes Wegener, Joel Daniel Irwin, MD   rosuvastatin (CRESTOR) 20 MG tablet TAKE 1 TABLET BY MOUTH EVERY DAY 5/23/20  Yes Wegener, Joel Daniel Irwin, MD   sertraline (ZOLOFT) 100 MG tablet TAKE 2 TABLETS BY MOUTH EVERY DAY 10/8/19  Yes Wegener, Joel Daniel Irwin, MD   STATIN NOT PRESCRIBED, INTENTIONAL, Please  choose reason not prescribed, below 9/17/18  Yes Wegener, Joel Daniel Irwin, MD   Syringe, Disposable, 25 ML MISC Syringe to be used for nasal irrigation 8/19/16  Yes Antonio Chávez MD   triamcinolone (KENALOG) 0.1 % external ointment APPLY SPARINGLY TO AFFECTED AREA THREE TIMES DAILY FOR 14 DAYS. 6/16/20  Yes Sonia Garcia MD   ondansetron (ZOFRAN) 4 MG tablet Take 1 tablet (4 mg) by mouth every 8 hours as needed for nausea  Patient not taking: Reported on 7/21/2020 6/26/20   Wegener, Joel Daniel Irwin, MD       Allergies  Allergies   Allergen Reactions     Lisinopril Anaphylaxis     Swollen tongue; inability to swallow; drooling; hives; swollen face, neck, angioedema     Acetaminophen      Hx of cirrhosis      Amlodipine      Swelling, hives, possible angioedema       Morphine      b/p dropped and arms went numb  Hypotension     Quinolones Hives     Bactrim [Sulfamethoxazole W/Trimethoprim] Rash     Levaquin Swelling and Rash     Swelling in lip and tongue felt thick       Review of systems  A 10-point review of systems was negative    Examination  There were no vitals taken for this visit.  There is no height or weight on file to calculate BMI.    Gen- well, NAD, A+Ox3, normal color  Extr- hands normal, no EVA  Skin- no rash or jaundice  Psych- normal mood    Laboratory  Lab Results   Component Value Date     07/20/2020    POTASSIUM 3.5 07/20/2020    CHLORIDE 105 07/20/2020    CO2 27 07/20/2020    BUN 11 07/20/2020    CR 0.64 07/20/2020       Lab Results   Component Value Date    BILITOTAL 0.4 07/20/2020    ALT 25 07/20/2020    AST 14 07/20/2020    ALKPHOS 100 07/20/2020       Lab Results   Component Value Date    ALBUMIN 3.5 07/20/2020    PROTTOTAL 7.6 07/20/2020        Lab Results   Component Value Date    WBC 7.0 07/20/2020    HGB 13.4 07/20/2020    MCV 90 07/20/2020     07/20/2020       Lab Results   Component Value Date    INR 1.26 07/20/2020     MELD-Na score: 9 at 7/20/2020  8:10  AM  MELD score: 9 at 7/20/2020  8:10 AM  Calculated from:  Serum Creatinine: 0.64 mg/dL (Rounded to 1 mg/dL) at 7/20/2020  8:10 AM  Serum Sodium: 138 mmol/L (Rounded to 137 mmol/L) at 7/20/2020  8:10 AM  Total Bilirubin: 0.4 mg/dL (Rounded to 1 mg/dL) at 7/20/2020  8:10 AM  INR(ratio): 1.26 at 7/20/2020  8:10 AM  Age: 59 years 9 months    Radiology    ASSESSMENT AND PLAN:  Mr. Aguilar is a 59-year-old male with alcohol-related chronic liver disease.  As he abstained from alcoholic beverages he did improve and he does not have any more ascites nor did he have any gastrointestinal bleeding.  His last endoscopy was done here in 08/2018 and at that time, there were no esophageal varices.  Please note that his first presentation when he was initially diagnosed with liver disease was gastrointestinal bleed related with esophageal varices, so we advised him to continue abstaining from alcoholic beverages.  He will continue having surveillance for HCC and he will be seen here every 6 months.  For his other medical issues, he will follow with his PCP.     Kimberly Ramirez MD  Hepatology  [facility]

## 2020-07-24 DIAGNOSIS — Z79.4 TYPE 2 DIABETES MELLITUS WITH DIABETIC POLYNEUROPATHY, WITH LONG-TERM CURRENT USE OF INSULIN (H): ICD-10-CM

## 2020-07-24 DIAGNOSIS — E11.42 TYPE 2 DIABETES MELLITUS WITH DIABETIC POLYNEUROPATHY, WITH LONG-TERM CURRENT USE OF INSULIN (H): ICD-10-CM

## 2020-07-24 DIAGNOSIS — E78.5 HYPERLIPIDEMIA LDL GOAL <100: ICD-10-CM

## 2020-07-29 RX ORDER — ROSUVASTATIN CALCIUM 10 MG/1
TABLET, COATED ORAL
Qty: 90 TABLET | Refills: 3 | Status: SHIPPED | OUTPATIENT
Start: 2020-07-29 | End: 2021-10-26

## 2020-08-04 DIAGNOSIS — K70.30 ALCOHOLIC CIRRHOSIS OF LIVER WITHOUT ASCITES (H): Primary | ICD-10-CM

## 2020-08-04 DIAGNOSIS — R11.0 NAUSEA: ICD-10-CM

## 2020-08-07 ENCOUNTER — ANCILLARY PROCEDURE (OUTPATIENT)
Dept: GENERAL RADIOLOGY | Facility: CLINIC | Age: 60
End: 2020-08-07
Attending: PODIATRIST
Payer: MEDICARE

## 2020-08-07 ENCOUNTER — OFFICE VISIT (OUTPATIENT)
Dept: ORTHOPEDICS | Facility: CLINIC | Age: 60
End: 2020-08-07
Payer: MEDICARE

## 2020-08-07 DIAGNOSIS — M76.61 ACHILLES TENDINITIS OF RIGHT LOWER EXTREMITY: ICD-10-CM

## 2020-08-07 DIAGNOSIS — M76.61 ACHILLES TENDINITIS OF RIGHT LOWER EXTREMITY: Primary | ICD-10-CM

## 2020-08-07 NOTE — PROGRESS NOTES
Chief Complaint:   Chief Complaint   Patient presents with     RECHECK     right great toe pain s/p nail removal and right achilles pain right fifth toe wound has healed per patient           Allergies   Allergen Reactions     Lisinopril Anaphylaxis     Swollen tongue; inability to swallow; drooling; hives; swollen face, neck, angioedema     Acetaminophen      Hx of cirrhosis      Amlodipine      Swelling, hives, possible angioedema       Morphine      b/p dropped and arms went numb  Hypotension     Quinolones Hives     Bactrim [Sulfamethoxazole W/Trimethoprim] Rash     Levaquin Swelling and Rash     Swelling in lip and tongue felt thick         Subjective: Erwin is a 59 year old male who presents to the clinic today for a follow up of right heel pain.  He relates that he is continued to have pain in the heel.  He has tried to do some stretching, and this is not very helpful.  He does have significant low back pain.  He relates that the pain is across the entire heel and he has it when he is weightbearing and nonweightbearing.  He also relates that he is getting pain in the right hallux nail bed, which was removed.    Objective  Data Unavailable Data Unavailable Data Unavailable Data Unavailable Data Unavailable 0 lbs 0 oz  DP and PT pulses 2/4.  Capillary refill time is within normal limits.  Gross sensation is diminished bilaterally with right less than left.  Equinus is noted to the right foot.  The foot is in a cavus position with extensor substitution hammertoes noted.  Pain noted with palpation of the Achilles tendon, lateral calcaneal squeeze, and medial attachment of the plantar fascia on the calcaneus.  Pain noted in the medial band of the plantar fascia on the right foot.  No pain along the courses of the PT or peroneal tendons on the right.  Nail on the right hallux has some dry skin in the nail gutters.    right calcaneal xrays indicated in 2 weightbearing views.    Retrocalcaneal and plantar calcaneal  spurring noted.  No acute processes noted.      Assessment: Erwin is a 59-year-old male with right heel pain globally across entire heel.  He also does have significant lower back pain and I am wondering how much of this pain comes from his back.  We will get him an MRI to take a look at the integrity of the Achilles tendon.  I also like to see if there is any inflammation within the calcaneus.  He agrees to this plan.    Plan:   - Pt seen and evaluated  -X-rays taken and discussed with him.  -MRI was ordered for him.  -I recommended that he use a walking boot on the right side.  This was dispensed to him.  - Pt to return to clinic status post MRI.

## 2020-08-07 NOTE — LETTER
8/7/2020         RE: Erwin Aguilar  [address]  [address]  [place] [address]        Dear Colleague,    Thank you for referring your patient, Erwin Aguilar, to the [facility]. Please see a copy of my visit note below.    Chief Complaint:   Chief Complaint   Patient presents with     RECHECK     right great toe pain s/p nail removal and right achilles pain right fifth toe wound has healed per patient           Allergies   Allergen Reactions     Lisinopril Anaphylaxis     Swollen tongue; inability to swallow; drooling; hives; swollen face, neck, angioedema     Acetaminophen      Hx of cirrhosis      Amlodipine      Swelling, hives, possible angioedema       Morphine      b/p dropped and arms went numb  Hypotension     Quinolones Hives     Bactrim [Sulfamethoxazole W/Trimethoprim] Rash     Levaquin Swelling and Rash     Swelling in lip and tongue felt thick         Subjective: Erwin is a 59 year old male who presents to the clinic today for a follow up of right heel pain.  He relates that he is continued to have pain in the heel.  He has tried to do some stretching, and this is not very helpful.  He does have significant low back pain.  He relates that the pain is across the entire heel and he has it when he is weightbearing and nonweightbearing.  He also relates that he is getting pain in the right hallux nail bed, which was removed.    Objective  Data Unavailable Data Unavailable Data Unavailable Data Unavailable Data Unavailable 0 lbs 0 oz  DP and PT pulses 2/4.  Capillary refill time is within normal limits.  Gross sensation is diminished bilaterally with right less than left.  Equinus is noted to the right foot.  The foot is in a cavus position with extensor substitution hammertoes noted.  Pain noted with palpation of the Achilles tendon, lateral calcaneal squeeze, and medial attachment of the plantar fascia on the calcaneus.  Pain noted in the medial band of the plantar fascia on  the right foot.  No pain along the courses of the PT or peroneal tendons on the right.  Nail on the right hallux has some dry skin in the nail gutters.    right calcaneal xrays indicated in 2 weightbearing views.    Retrocalcaneal and plantar calcaneal spurring noted.  No acute processes noted.      Assessment: Erwin is a 59-year-old male with right heel pain globally across entire heel.  He also does have significant lower back pain and I am wondering how much of this pain comes from his back.  We will get him an MRI to take a look at the integrity of the Achilles tendon.  I also like to see if there is any inflammation within the calcaneus.  He agrees to this plan.    Plan:   - Pt seen and evaluated  -X-rays taken and discussed with him.  -MRI was ordered for him.  -I recommended that he use a walking boot on the right side.  This was dispensed to him.  - Pt to return to clinic status post MRI.      Again, thank you for allowing me to participate in the care of your patient.        Sincerely,        Victor M Anaya DPM

## 2020-08-07 NOTE — NURSING NOTE
Reason For Visit:   Chief Complaint   Patient presents with     RECHECK     right great toe pain s/p nail removal and right achilles pain right fifth toe wound has healed per patient        There were no vitals taken for this visit.    Pain Assessment  Patient Currently in Pain: Yes  Primary Pain Location: Foot    Pao Ramires ATC

## 2020-08-28 ENCOUNTER — TELEPHONE (OUTPATIENT)
Dept: FAMILY MEDICINE | Facility: CLINIC | Age: 60
End: 2020-08-28

## 2020-08-28 NOTE — TELEPHONE ENCOUNTER
Central Prior Authorization Team   Phone: [phone]      PA Initiation    Medication: ondansetron (ZOFRAN) 4 MG tablet, new diagnosis  Insurance Company: Medicare Blue - Phone [phone] Fax [phone]  Pharmacy Filling the Rx: [id number] - [place], [place] -  [address]  Filling Pharmacy Phone: [phone]  Filling Pharmacy Fax:    Start Date: 8/28/2020

## 2020-08-28 NOTE — TELEPHONE ENCOUNTER
Prior Authorization Retail Medication Request    Medication/Dose: ondansetron (ZOFRAN) 4 MG tablet   ICD code (if different than what is on RX):  Previously Tried and Failed:  Rationale:    Insurance Name:    Insurance ID: [id number]     Pharmacy Information (if different than what is on RX)  Name:  Phone:    See PCP's new diagnosis.  Please include previous medications tried and failed.  Please ask insurance for medications on formulary.

## 2020-08-30 DIAGNOSIS — I10 HYPERTENSION GOAL BP (BLOOD PRESSURE) < 140/90: ICD-10-CM

## 2020-08-31 RX ORDER — PROPRANOLOL HYDROCHLORIDE 40 MG/1
80 TABLET ORAL 2 TIMES DAILY
Qty: 360 TABLET | Refills: 3 | Status: SHIPPED | OUTPATIENT
Start: 2020-08-31 | End: 2021-09-20

## 2020-08-31 NOTE — TELEPHONE ENCOUNTER
Prior Authorization Approval    Authorization Effective Date: 5/30/2020  Authorization Expiration Date: 8/28/2021  Medication: ondansetron (ZOFRAN) 4 MG tablet-APPROVED  Approved Dose/Quantity:  Reference #:     Insurance Company: Medicare Blue [employer] Phone [phone] Fax [phone]  Expected CoPay:       CoPay Card Available:      Foundation Assistance Needed:    Which Pharmacy is filling the prescription (Not needed for infusion/clinic administered): [id number] - [place], [place] - [address] [facility]  Pharmacy Notified: Yes-Left message with approval, process, fill, and notify patient when ready  Patient Notified: No

## 2020-09-01 ENCOUNTER — TELEPHONE (OUTPATIENT)
Dept: FAMILY MEDICINE | Facility: CLINIC | Age: 60
End: 2020-09-01

## 2020-09-01 NOTE — TELEPHONE ENCOUNTER
Central Prior Authorization Team   Phone: [phone]      PA Initiation    Medication: ondansetron (ZOFRAN) 4 MG tablet, rec 9-1-20  Insurance Company: [facility] [employer] - Phone [phone] Fax [phone]  Pharmacy Filling the Rx: [id number] - [place], [place] - [address] [place] [address] AT [facility]  Filling Pharmacy Phone: [phone]  Filling Pharmacy Fax:    Start Date: 9/1/2020

## 2020-09-02 NOTE — TELEPHONE ENCOUNTER
Prior Authorization Not Needed per Insurance    Medication: ondansetron (ZOFRAN) 4 MG tablet, rec 9-1-20  Insurance Company: [facility] [employer] - Phone [phone] Fax [phone]  Expected CoPay:      Pharmacy Filling the Rx: [id number] - [place], [place] - [address] [place] [address] & [facility]  Pharmacy Notified: Yes  Patient Notified: Yes  **Instructed pharmacy to notify patient when script is ready to /ship.**

## 2020-09-08 ENCOUNTER — ANCILLARY PROCEDURE (OUTPATIENT)
Dept: MRI IMAGING | Facility: CLINIC | Age: 60
End: 2020-09-08
Attending: PODIATRIST
Payer: MEDICARE

## 2020-09-08 DIAGNOSIS — M76.61 ACHILLES TENDINITIS OF RIGHT LOWER EXTREMITY: ICD-10-CM

## 2020-09-22 ENCOUNTER — MYC MEDICAL ADVICE (OUTPATIENT)
Dept: FAMILY MEDICINE | Facility: CLINIC | Age: 60
End: 2020-09-22

## 2020-10-08 ENCOUNTER — E-VISIT (OUTPATIENT)
Dept: FAMILY MEDICINE | Facility: CLINIC | Age: 60
End: 2020-10-08
Payer: MEDICARE

## 2020-10-08 DIAGNOSIS — M25.571 PAIN IN JOINT INVOLVING ANKLE AND FOOT, RIGHT: Primary | ICD-10-CM

## 2020-10-08 DIAGNOSIS — M77.31 CALCANEAL SPUR OF RIGHT FOOT: ICD-10-CM

## 2020-10-08 DIAGNOSIS — M76.61 ACHILLES TENDINITIS OF RIGHT LOWER EXTREMITY: ICD-10-CM

## 2020-10-08 PROCEDURE — 99422 OL DIG E/M SVC 11-20 MIN: CPT | Performed by: FAMILY MEDICINE

## 2020-10-08 NOTE — TELEPHONE ENCOUNTER
Provider E-Visit time total (minutes): 15    No suspicious activity on [place] rx monitoring database .    Joel Wegener,MD

## 2020-10-09 ENCOUNTER — MYC MEDICAL ADVICE (OUTPATIENT)
Dept: FAMILY MEDICINE | Facility: CLINIC | Age: 60
End: 2020-10-09

## 2020-10-09 RX ORDER — OXYCODONE HYDROCHLORIDE 5 MG/1
5 TABLET ORAL 2 TIMES DAILY PRN
Qty: 60 TABLET | Refills: 0 | Status: SHIPPED | OUTPATIENT
Start: 2020-10-09 | End: 2020-10-21

## 2020-10-12 ENCOUNTER — TELEPHONE (OUTPATIENT)
Dept: ORTHOPEDICS | Facility: CLINIC | Age: 60
End: 2020-10-12

## 2020-10-12 NOTE — TELEPHONE ENCOUNTER
Erwin phoned and left voicemail with surgery scheduler asking how far out Dr Sandoval is scheduling surgeries.  Pt has a meeting with Dr Sandoval on 10/27/2020 for Achilles pain.  It's not clear what kind of surgery will be offered, and looking at pt's health history, it's not clear if Erwin is a candidate for outpatient surgery setting.  Pt had added that his ankle pain has been going on for over one year, he has been using a walking cast and now a knee scooter.  He takes opioids for pain, and cares for his wife who suffers from dementia.  Pt was asked to call back so we can discuss his health history, and what he is planning for ankle surgery.  Carolina Morris RN    Erwin was contacted, he stated his surgery must take place in a hospital due to his past problems with bleeding postop.  Pt states he has waited almost two years with pain and needs opioids to help control the pain.  He wants to know how soon Dr Sandoval will be able to do surgery and fix his foot/ankle, stating he had back surgery that went bad, causing drop foot and Achilles problems, numbness on his foot and severe pain.  Pt was advised to have his conversation with Dr Sandoval as scheduled on 10/27/20, and if surgery is indicated, Dr Sandoval is at [facility] doing surgery in late November.  Pt understood we can not hold surgery time, but will await his clinic visit for a plan.  Carolina Morris RN

## 2020-10-15 ENCOUNTER — MYC MEDICAL ADVICE (OUTPATIENT)
Dept: FAMILY MEDICINE | Facility: CLINIC | Age: 60
End: 2020-10-15

## 2020-10-15 NOTE — TELEPHONE ENCOUNTER
"See Mychart    Patient asking if any communication with Dr. Sandoval--      Joel Wegener MD note from 10/12:  \"2.  I will communicate with Dr. aSndoval situation and ask if willing to put on surgery schedule tentatively at the hospital as a place duffy until seen.      I will update Giuliano Rodríguez when I have more information and encouraged him to send UpTo message to check in with me as needed. \"    Please advise    Thanks! Miriam Cole RN    "

## 2020-10-19 ENCOUNTER — MYC MEDICAL ADVICE (OUTPATIENT)
Dept: FAMILY MEDICINE | Facility: CLINIC | Age: 60
End: 2020-10-19

## 2020-10-19 DIAGNOSIS — K31.84 GASTROPARESIS: ICD-10-CM

## 2020-10-19 DIAGNOSIS — R11.0 NAUSEA WITHOUT VOMITING: ICD-10-CM

## 2020-10-19 DIAGNOSIS — E11.9 TYPE 2 DIABETES MELLITUS WITHOUT COMPLICATION, WITH LONG-TERM CURRENT USE OF INSULIN (H): ICD-10-CM

## 2020-10-19 DIAGNOSIS — Z79.4 TYPE 2 DIABETES MELLITUS WITHOUT COMPLICATION, WITH LONG-TERM CURRENT USE OF INSULIN (H): ICD-10-CM

## 2020-10-19 RX ORDER — PEN NEEDLE, DIABETIC 32GX 5/32"
NEEDLE, DISPOSABLE MISCELLANEOUS
Qty: 100 EACH | Refills: 3 | Status: SHIPPED | OUTPATIENT
Start: 2020-10-19 | End: 2021-09-29

## 2020-10-19 RX ORDER — PROMETHAZINE HYDROCHLORIDE 25 MG/1
TABLET ORAL
Qty: 90 TABLET | Refills: 1 | Status: SHIPPED | OUTPATIENT
Start: 2020-10-19 | End: 2020-12-30

## 2020-10-20 ENCOUNTER — PREP FOR PROCEDURE (OUTPATIENT)
Dept: ORTHOPEDICS | Facility: CLINIC | Age: 60
End: 2020-10-20

## 2020-10-20 ENCOUNTER — OFFICE VISIT (OUTPATIENT)
Dept: ORTHOPEDICS | Facility: CLINIC | Age: 60
End: 2020-10-20
Payer: MEDICARE

## 2020-10-20 VITALS — WEIGHT: 216 LBS | BODY MASS INDEX: 30.24 KG/M2 | HEIGHT: 71 IN

## 2020-10-20 DIAGNOSIS — M25.571 PAIN IN JOINT, ANKLE AND FOOT, RIGHT: Primary | ICD-10-CM

## 2020-10-20 DIAGNOSIS — M25.571 RIGHT ANKLE PAIN: Primary | ICD-10-CM

## 2020-10-20 DIAGNOSIS — Z11.59 ENCOUNTER FOR SCREENING FOR OTHER VIRAL DISEASES: Primary | ICD-10-CM

## 2020-10-20 PROCEDURE — 99203 OFFICE O/P NEW LOW 30 MIN: CPT | Performed by: ORTHOPAEDIC SURGERY

## 2020-10-20 ASSESSMENT — ENCOUNTER SYMPTOMS
POOR WOUND HEALING: 1
SKIN CHANGES: 0
VOMITING: 0
RECTAL PAIN: 0
NAIL CHANGES: 0
HEARTBURN: 0
DIARRHEA: 0
NAUSEA: 1
JAUNDICE: 0
BLOATING: 0
BOWEL INCONTINENCE: 0
ABDOMINAL PAIN: 0
BLOOD IN STOOL: 0
CONSTIPATION: 0

## 2020-10-20 ASSESSMENT — PATIENT HEALTH QUESTIONNAIRE - PHQ9: SUM OF ALL RESPONSES TO PHQ QUESTIONS 1-9: 8

## 2020-10-20 ASSESSMENT — MIFFLIN-ST. JEOR: SCORE: 1806.9

## 2020-10-20 NOTE — PROGRESS NOTES
CHIEF COMPLAINT:  Right heel pain.      HISTORY OF PRESENT ILLNESS:  Mr. Aguilar is a 60-year-old male who presents today for evaluation of his right foot.  The patient has been referred to us by his primary care doctor.      He presented with a fairly complex past medical history.  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  The patient carries a nerve stimulator for pain control of his lower extremities.  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  He reports not to be able to drive as well, and therefore, he will have to walk to the grocery store.  More recently in 06/2020 they have moved to a senior living facility and that also put a fair amount of physical stress on his leg.  At that point, he developed enough pain and discomfort in the heel that it has become extremely unmanageable.  He also developed a wound on the posterior aspect of the heel by wearing a CAM Walker.      On today's visit, he presents with a CAM Walker and Roll-A-Bout.  Reports to be unable to do any significant walking secondary to pain.      PAST MEDICAL AND SURGICAL HISTORY:  Reviewed today which were extremely lengthy.      CURRENT MEDICATIONS:  Reviewed today.        DRUG ALLERGIES:  Multiple and reviewed today.      PHYSICAL EXAMINATION:  On today's visit, he presents as a pleasant male in no apparent distress with a height of 5 feet 11 inches and a weight of 216 pounds.  Denies to have any constitutional symptoms.      On today's visit, he presents with full range of motion of the right ankle, hindfoot and midfoot joints, although this is limited by pain at extreme plantar and dorsiflexion.   The pain is located along the posterior aspect of the heel.  Presents with a very small callus formation along the posterior aspect of the heel as well.  Forefoot exam is unremarkable.  He presented with minimum pain with palpation of the Achilles tendon and a large amount of pain with palpation of the insertion of the Achilles tendon into the calcaneus tuberosity.      RADIOGRAPHIC EVALUATION:  Plain x-rays were reviewed today which were significant for showing a large osteophyte formation across the Achilles attachment into the calcaneus tuberosity.  Otherwise, they are fairly unremarkable.      An MRI was obtained which was significant for showing similar findings with some Achilles mild tendinosis, especially anteriorly.      ASSESSMENT:  Right Achilles insertional calcific tendinitis.      PLAN:  I discussed with the patient that at this point the option will be to proceed with an Achilles tendon debridement with partial calcaneus excision and subsequent reattachment.  Given the fact that he presents with such a large osteophyte, I do not believe that a conservative approach will be of benefit to him.      We discussed with him the most likely postoperative course and complications from undergoing such intervention, which include but are not limited to infection, bleeding, nerve damage, residual pain and stiffness.      I also emphasized to the patient the importance of keeping realistic expectations as I do not see any anticipated benefit to the rest of the leg as the surgery's aim is to improve the pain along the heel area.  We also discussed that given the fact that he has been so immobile for such a long period of time that he will require quite a bit of physical therapy in order to resume regular activities.  We have prescribed him for 6 months until he becomes close to pain free.      We also talked about his disposition on today's visit and the fact that he would likely have the surgery done in the  hospital just in case he runs into any complications but he does understand and appreciate the surgery will be planned to be outpatient.  He does not have any concerns with ability of getting help once he makes it home.      All questions were answered.  The patient was pleased with the discussion.  The patient will follow up accordingly.      TT 30 minutes, CT 20 minutes.

## 2020-10-20 NOTE — LETTER
10/20/2020       RE: Erwin Aguilar  [address]  [place] [address]    Dear Colleague,    Thank you for referring your patient, Erwin Aguilar, to the [facility] [place]. Please see a copy of my visit note below.    CHIEF COMPLAINT:  Right heel pain.      HISTORY OF PRESENT ILLNESS:  Mr. Aguilar is a 60-year-old male who presents today for evaluation of his right foot.  The patient has been referred to us by his primary care doctor.      He presented with a fairly complex past medical history.  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  The patient carries a nerve stimulator for pain control of his lower extremities.  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  He reports not to be able to drive as well, and therefore, he will have to walk to the grocery store.  More recently in 06/2020 they have moved to a senior living facility and that also put a fair amount of physical stress on his leg.  At that point, he developed enough pain and discomfort in the heel that it has become extremely unmanageable.  He also developed a wound on the posterior aspect of the heel by wearing a CAM Walker.      On today's visit, he presents with a CAM Walker and Roll-A-Bout.  Reports to be unable to do any significant walking secondary to pain.      PAST MEDICAL AND SURGICAL HISTORY:  Reviewed today which were extremely lengthy.      CURRENT MEDICATIONS:  Reviewed today.        DRUG ALLERGIES:  Multiple and reviewed today.      PHYSICAL EXAMINATION:  On today's visit, he presents as a pleasant male in no apparent distress with a height of 5 feet 11 inches and  a weight of 216 pounds.  Denies to have any constitutional symptoms.      On today's visit, he presents with full range of motion of the right ankle, hindfoot and midfoot joints, although this is limited by pain at extreme plantar and dorsiflexion.  The pain is located along the posterior aspect of the heel.  Presents with a very small callus formation along the posterior aspect of the heel as well.  Forefoot exam is unremarkable.  He presented with minimum pain with palpation of the Achilles tendon and a large amount of pain with palpation of the insertion of the Achilles tendon into the calcaneus tuberosity.      RADIOGRAPHIC EVALUATION:  Plain x-rays were reviewed today which were significant for showing a large osteophyte formation across the Achilles attachment into the calcaneus tuberosity.  Otherwise, they are fairly unremarkable.      An MRI was obtained which was significant for showing similar findings with some Achilles mild tendinosis, especially anteriorly.      ASSESSMENT:  Right Achilles insertional calcific tendinitis.      PLAN:  I discussed with the patient that at this point the option will be to proceed with an Achilles tendon debridement with partial calcaneus excision and subsequent reattachment.  Given the fact that he presents with such a large osteophyte, I do not believe that a conservative approach will be of benefit to him.      We discussed with him the most likely postoperative course and complications from undergoing such intervention, which include but are not limited to infection, bleeding, nerve damage, residual pain and stiffness.      I also emphasized to the patient the importance of keeping realistic expectations as I do not see any anticipated benefit to the rest of the leg as the surgery's aim is to improve the pain along the heel area.  We also discussed that given the fact that he has been so immobile for such a long period of time that he will require quite a bit of  physical therapy in order to resume regular activities.  We have prescribed him for 6 months until he becomes close to pain free.      We also talked about his disposition on today's visit and the fact that he would likely have the surgery done in the hospital just in case he runs into any complications but he does understand and appreciate the surgery will be planned to be outpatient.  He does not have any concerns with ability of getting help once he makes it home.      All questions were answered.  The patient was pleased with the discussion.  The patient will follow up accordingly.      TT 30 minutes, CT 20 minutes.       Eh Sandoval MD

## 2020-10-20 NOTE — NURSING NOTE
"Reason For Visit:   Chief Complaint   Patient presents with     Consult     right ankle pain        Ht 1.803 m (5' 11\")   Wt 98 kg (216 lb)   BMI 30.13 kg/m      Pain Assessment  Patient Currently in Pain: Yes  0-10 Pain Scale: 10  Primary Pain Location: Ankle    Pao Ramires ATC    "

## 2020-10-20 NOTE — NURSING NOTE
Teaching Flowsheet   Relevant Diagnosis: Right insertional calcific tendonitis  Teaching Topic: preop Right Achilles debridement and partial calcaneus excision.    Erwin lives with his wife who suffers from dementia, at a senior assisted living facility.  Health history includes diabetes, chronic back pain with implanted spinal cord stimulator, takes occasional oxycodone about 2 tabs max daily, liver damage avoids tylenol.  Pt states he will have a friend take him back after surgery, and he will check availability for person to be with him for the mandatory 24 hr postop time, and we will also inquire with  for possible services that may be available for him.  Planning surgery 11/11/20.  Verifed with Dr Sandoval that pt having dental implants on 11/4/20 is fine.     Person(s) involved in teaching:   Patient     Motivation Level:  Asks Questions: Yes  Eager to Learn: Yes  Cooperative: Yes  Receptive (willing/able to accept information): Yes  Any cultural factors/Sikh beliefs that may influence understanding or compliance? No  Comments:      Patient demonstrates understanding of the following:  Reason for the appointment, diagnosis and treatment plan: Yes  Knowledge of proper use of medications and conditions for which they are ordered (with special attention to potential side effects or drug interactions): Yes  Which situations necessitate calling provider and whom to contact: Yes       Teaching Concerns Addressed:   Comments:      Proper use and care of medications (medical equip, care aids, etc.): Yes  Nutritional needs and diet plan: Yes  Pain management techniques: Yes  Wound Care: Yes  How and/when to access community resources: Yes     Instructional Materials Used/Given: preop pkt, antiseptic soap     Time spent with patient: 30 minutes.

## 2020-10-21 ENCOUNTER — MYC MEDICAL ADVICE (OUTPATIENT)
Dept: FAMILY MEDICINE | Facility: CLINIC | Age: 60
End: 2020-10-21

## 2020-10-21 DIAGNOSIS — M76.61 ACHILLES TENDINITIS OF RIGHT LOWER EXTREMITY: ICD-10-CM

## 2020-10-21 DIAGNOSIS — M77.31 CALCANEAL SPUR OF RIGHT FOOT: ICD-10-CM

## 2020-10-21 DIAGNOSIS — M25.571 PAIN IN JOINT INVOLVING ANKLE AND FOOT, RIGHT: ICD-10-CM

## 2020-10-21 RX ORDER — OXYCODONE HYDROCHLORIDE 5 MG/1
5 TABLET ORAL 2 TIMES DAILY
Qty: 14 TABLET | Refills: 0 | Status: SHIPPED | OUTPATIENT
Start: 2020-10-21 | End: 2020-10-21

## 2020-10-21 RX ORDER — OXYCODONE HYDROCHLORIDE 5 MG/1
5 TABLET ORAL 2 TIMES DAILY
Qty: 60 TABLET | Refills: 0 | Status: SHIPPED | OUTPATIENT
Start: 2020-10-21 | End: 2020-11-20

## 2020-10-22 ENCOUNTER — TELEPHONE (OUTPATIENT)
Dept: FAMILY MEDICINE | Facility: CLINIC | Age: 60
End: 2020-10-22

## 2020-10-22 NOTE — TELEPHONE ENCOUNTER
Prior Authorization Retail Medication Request    Medication/Dose: oxyCODONE (ROXICODONE) 5 MG tablet  ICD code (if different than what is on RX):  Previously Tried and Failed:  Rationale:    Insurance Name:    Insurance ID: [id number]    Pharmacy Information (if different than what is on RX)  Name:  Phone:    Please include previous medications tried and failed.  Please ask insurance for medications on formulary.

## 2020-10-27 ENCOUNTER — PRE VISIT (OUTPATIENT)
Dept: ORTHOPEDICS | Facility: CLINIC | Age: 60
End: 2020-10-27

## 2020-11-06 ENCOUNTER — TELEPHONE (OUTPATIENT)
Dept: ORTHOPEDICS | Facility: CLINIC | Age: 60
End: 2020-11-06

## 2020-11-06 NOTE — TELEPHONE ENCOUNTER
[facility] Call Center    Phone Message:  Pt is setting up transportation for his upcoming Surgery appt and post surgery.  Pt has questions regarding his surgery schedule and the plan for post surgery.  Please call pt back.    May a detailed message be left on voicemail: Yes     Reason for Call: Other: Surgery Schedule and Post Op Questions      Action Taken: Patient transferred to: TEAM

## 2020-11-06 NOTE — TELEPHONE ENCOUNTER
Erwin was phoned back by RN and we reviewed H&P and COVID test, scheduled Monday, NPO and showers.  Pt states he has a friend who will drive him and others to fill in for the 24 hours postop as required.  Pt states he feels like everything is in order.  He was told that his arrival time will be given to him when the preadmission nurse calls to review the plan.  Carolina Morris RN

## 2020-11-09 ENCOUNTER — OFFICE VISIT (OUTPATIENT)
Dept: FAMILY MEDICINE | Facility: CLINIC | Age: 60
End: 2020-11-09
Payer: MEDICARE

## 2020-11-09 VITALS
SYSTOLIC BLOOD PRESSURE: 123 MMHG | RESPIRATION RATE: 18 BRPM | WEIGHT: 215 LBS | DIASTOLIC BLOOD PRESSURE: 75 MMHG | HEART RATE: 49 BPM | BODY MASS INDEX: 29.99 KG/M2 | OXYGEN SATURATION: 96 % | TEMPERATURE: 97.9 F

## 2020-11-09 DIAGNOSIS — I10 HYPERTENSION GOAL BP (BLOOD PRESSURE) < 140/90: ICD-10-CM

## 2020-11-09 DIAGNOSIS — J45.40 MODERATE PERSISTENT ASTHMA WITHOUT COMPLICATION: ICD-10-CM

## 2020-11-09 DIAGNOSIS — Z79.4 TYPE 2 DIABETES MELLITUS WITH DIABETIC POLYNEUROPATHY, WITH LONG-TERM CURRENT USE OF INSULIN (H): ICD-10-CM

## 2020-11-09 DIAGNOSIS — E78.5 HYPERLIPIDEMIA LDL GOAL <100: ICD-10-CM

## 2020-11-09 DIAGNOSIS — Z01.818 PREOP GENERAL PHYSICAL EXAM: Primary | ICD-10-CM

## 2020-11-09 DIAGNOSIS — M65.28 CALCIFIC ACHILLES TENDINITIS: ICD-10-CM

## 2020-11-09 DIAGNOSIS — E11.42 TYPE 2 DIABETES MELLITUS WITH DIABETIC POLYNEUROPATHY, WITH LONG-TERM CURRENT USE OF INSULIN (H): ICD-10-CM

## 2020-11-09 DIAGNOSIS — R94.31 PROLONGED QT INTERVAL: ICD-10-CM

## 2020-11-09 DIAGNOSIS — Z11.59 ENCOUNTER FOR SCREENING FOR OTHER VIRAL DISEASES: ICD-10-CM

## 2020-11-09 DIAGNOSIS — K70.30 ALCOHOLIC CIRRHOSIS OF LIVER WITHOUT ASCITES (H): ICD-10-CM

## 2020-11-09 LAB
ERYTHROCYTE [DISTWIDTH] IN BLOOD BY AUTOMATED COUNT: 12 % (ref 10–15)
HCT VFR BLD AUTO: 39.2 % (ref 40–53)
HGB BLD-MCNC: 13.5 G/DL (ref 13.3–17.7)
MCH RBC QN AUTO: 30.5 PG (ref 26.5–33)
MCHC RBC AUTO-ENTMCNC: 34.4 G/DL (ref 31.5–36.5)
MCV RBC AUTO: 89 FL (ref 78–100)
PLATELET # BLD AUTO: 191 10E9/L (ref 150–450)
RBC # BLD AUTO: 4.43 10E12/L (ref 4.4–5.9)
WBC # BLD AUTO: 7.2 10E9/L (ref 4–11)

## 2020-11-09 PROCEDURE — 99215 OFFICE O/P EST HI 40 MIN: CPT | Performed by: FAMILY MEDICINE

## 2020-11-09 PROCEDURE — 80053 COMPREHEN METABOLIC PANEL: CPT | Performed by: FAMILY MEDICINE

## 2020-11-09 PROCEDURE — 93000 ELECTROCARDIOGRAM COMPLETE: CPT | Performed by: FAMILY MEDICINE

## 2020-11-09 PROCEDURE — 36415 COLL VENOUS BLD VENIPUNCTURE: CPT | Performed by: FAMILY MEDICINE

## 2020-11-09 PROCEDURE — U0003 INFECTIOUS AGENT DETECTION BY NUCLEIC ACID (DNA OR RNA); SEVERE ACUTE RESPIRATORY SYNDROME CORONAVIRUS 2 (SARS-COV-2) (CORONAVIRUS DISEASE [COVID-19]), AMPLIFIED PROBE TECHNIQUE, MAKING USE OF HIGH THROUGHPUT TECHNOLOGIES AS DESCRIBED BY CMS-2020-01-R: HCPCS | Performed by: ORTHOPAEDIC SURGERY

## 2020-11-09 PROCEDURE — 85027 COMPLETE CBC AUTOMATED: CPT | Performed by: FAMILY MEDICINE

## 2020-11-09 NOTE — PATIENT INSTRUCTIONS

## 2020-11-09 NOTE — PROGRESS NOTES
[facility] [address] [place] [address]  [phone]  Dept: [phone]    PRE-OP EVALUATION:  Today's date: 2020    Erwin Aguilar (: 1960) presents for pre-operative evaluation assessment as requested by Eh Faria   He requires evaluation and anesthesia risk assessment prior to undergoing surgery/procedure for treatment of  Calcaneal bone spur.  Outpatient surgery.     Hx of excessive bleeding due to prolonged surgery. 2018 during surgery to replace spinal stimulator, surgery was prolonged with blood loss. Had anemia post-op. No blood transfusion required.     Hx of esophageal varices in , had bleeding. Has quit drinking in .    Has had general anesthesia in past without issue.     Proposed Surgery/ Procedure: Right achilles debridement and partial calcaneus excision  Date of Surgery/ Procedure: 2020  Time of Surgery/ Procedure: 10:10 AM  Hospital/Surgical Facility: [facility] [place]  Surgery Fax Number: Note does not need to be faxed, will be available electronically in Epic.  Primary Physician: Wegener, Joel Daniel Irwin  Type of Anesthesia Anticipated: Choice    Preoperative Questionnaire:   No - Have you ever had a heart attack or stroke?  No - Have you ever had surgery on your heart or blood vessels, such as a stent, coronary (heart) bypass, or surgery on an artery in the head, neck, heart, or legs?  No - Do you have chest pain when you are physically active?  No - Do you have a history of heart failure?  No - DO YOU HAVE A HISTORY OF HEART FAILURE?   No - Do you currently have a cold, bronchitis, or symptoms of other respiratory (head and chest) infections?  No - Do you have a cough, shortness of breath, or wheezing?  No - Do you or anyone in your family have a history of blood clots?  Yes - Do you or anyone in your family have a serious bleeding problem, such as long-lasting bleeding after surgeries or cuts?  No - Have you  ever had anemia or been told to take iron pills?  No - Have you had any abnormal blood loss such as black, tarry or bloody stools, or abnormal vaginal bleeding?  No - Have you ever had a blood transfusion?  Yes - Are you willing to have a blood transfusion if it is medically needed before, during, or after your surgery?  No - Have you or anyone in your family ever had problems with anesthesia (sedation for surgery)?  No - Do you have sleep apnea, excessive snoring, or daytime drowsiness?   No - Do you have any artifical heart valves or other implanted medical devices, such as a pacemaker, defibrillator, or continuous glucose monitor?  No - Do you have any artifical joints?  No - Are you allergic to latex?  No - Is there any chance that you may be pregnant?    Patient has a Health Care Directive or Living Will:  NO    HPI:     HPI related to upcoming procedure: Hx of low back pain s/p spinal surgery with neurological complications to lower legs, bowels and bladder. Currently has spinal stimulator for pain control. Right insertional calcific tendonitis since 2019. Worse with walking. No recent illness.     Other conditions:    ASTHMA - Patient has a longstanding history of moderate-severe Asthma . Patient has been doing well overall noting NO SYMPTOMS and continues on medication regimen consisting of albuterol without adverse reactions or side effects.     DIABETES - Patient has a longstanding history of DiabetesType Type II . Patient is being treated with insulin injections and denies significant side effects. Control has been good. Complicating factors include but are not limited to: hypertension and hyperlipidemia.     HYPERLIPIDEMIA - Patient has a long history of significant Hyperlipidemia requiring medication for treatment with recent poor control. Patient reports no problems or side effects with the medication.     HYPERTENSION - Patient has longstanding history of HTN , currently denies any symptoms referable  to elevated blood pressure. Specifically denies chest pain, palpitations, dyspnea, orthopnea, PND or peripheral edema. Blood pressure readings have been in normal range. Current medication regimen is as listed below. Patient denies any side effects of medication.   Hx of alcohol use in remission.  Hx of liver cirrhosis, followed by GI.  History of QT prolongation. Avoids QT prolonging meds.     MEDICAL HISTORY:     Patient Active Problem List    Diagnosis Date Noted     Right ankle pain 10/20/2020     Priority: Medium     Added automatically from request for surgery [id number]       Gastroparesis 10/08/2019     Priority: Medium     Alcoholism in remission (H) 10/08/2019     Priority: Medium     Osteoarthritis of lumbar spine, unspecified spinal osteoarthritis complication status 09/17/2018     Priority: Medium     S/P insertion of spinal cord stimulator 09/17/2018     Priority: Medium     Hematemesis with nausea 06/30/2018     Priority: Medium     Benign neoplasm of skin of trunk, except scrotum 01/12/2018     Priority: Medium     Type 2 diabetes mellitus with diabetic polyneuropathy, with long-term current use of insulin (H) 05/09/2017     Priority: Medium     Wound, open, buttock, right 04/20/2017     Priority: Medium     Type 2 diabetes mellitus without complication, with long-term current use of insulin (H) 10/06/2016     Priority: Medium     Calculus of gallbladder without cholecystitis without obstruction 05/10/2016     Priority: Medium     Lumbosacral radiculopathy 02/10/2016     Priority: Medium     S/p SCS implant 2010- [employer]       Alcoholic cirrhosis of liver without ascites (H) 10/13/2015     Priority: Medium     Right knee pain 09/20/2015     Priority: Medium     Fall 07/11/2015     Priority: Medium     Closed fracture of right patella 07/11/2015     Priority: Medium     Diagnosis updated by automated process. Provider to review and confirm.       Open wound of right heel 12/23/2014     Priority: Medium      Moderate persistent asthma 09/04/2014     Priority: Medium     Ganglion cyst 06/04/2014     Priority: Medium     Dry skin dermatitis 06/04/2014     Priority: Medium     Superficial thrombophlebitis of arm 04/03/2014     Priority: Medium     Cellulitis of hand 04/03/2014     Priority: Medium     Skin infection 04/02/2014     Priority: Medium     Esophageal varices in cirrhosis (H) 04/01/2014     Priority: Medium     Hospitalized for UGI bleed 3/28/14, endoscopy revealed bleeding varices.       Cirrhosis of liver (H) 04/01/2014     Priority: Medium     Not biopsy-proven as of 4/1/14.       Anemia due to blood loss, acute 04/01/2014     Priority: Medium     Edema of left lower extremity 03/31/2014     Priority: Medium     Muscle spasms of Upper extremity 01/20/2014     Priority: Medium     Atopic dermatitis 01/17/2014     Priority: Medium     Nausea without vomiting 11/27/2013     Priority: Medium     Problem list name updated by automated process. Provider to review       Cellulitis 11/01/2013     Priority: Medium     Wound infection 08/05/2013     Priority: Medium     Cauda equina syndrome with neurogenic bladder (H) 07/06/2013     Priority: Medium     Hypokalemia 11/06/2012     Priority: Medium     Prolonged QT interval 11/05/2012     Priority: Medium     Diastasis recti 11/05/2012     Priority: Medium     Hernia 10/25/2012     Priority: Medium     Abnormal EMG 05/01/2012     Priority: Medium     Health Care Home 10/28/2011     Priority: Medium     EMERGENCY CARE PLAN  Presenting Problem Signs and Symptoms Treatment Plan    Questions or conerns during clinic hours    I will call the clinic directly [phone]     Questions or conerns outside clinic hours    I will call the 24 hour nurse line at [phone]    Patient needs to schedule an appointment    I will call the 24 hour scheduling team at [phone] or clinic directly    Same day treatment     I will call the clinic first, nurse line if after hours,  urgent care and express care if needed                          DX V65.8 REPLACED WITH [id number] HEALTH CARE HOME (04/08/2013)       Abnormal liver function tests 06/14/2011     Priority: Medium     GERD (gastroesophageal reflux disease) 06/13/2011     Priority: Medium     Chronic pain syndrome 03/14/2011     Priority: Medium     Failed back surgery L5-S1   August 2009  Post-op complications: right dropped foot, bowel and bladder involvement    Patient is followed by WEGENER, JOEL DANIEL IRWIN for ongoing prescription of pain medication.  All refills should be approved by this provider, or covering partner.    Medication(s): oxycodone.   Maximum quantity per month: see sig  Clinic visit frequency required: Q 3 months     Controlled substance agreement on file: Yes       Date(s):  March 1, 2016      Pain Clinic evaluation in the past:      Date/Location:  non[facility], spinal stimulator,  [facility] x one 2015.     DIRE Total Score(s):  No flowsheet data found.    Last [facility] website verification: March 1, 2016     [email or web address]         Major depressive disorder, recurrent episode, mild (H) 12/13/2010     Priority: Medium     Hypertension goal BP (blood pressure) < 140/90 12/06/2010     Priority: Medium     Type 2 diabetes, HbA1c goal < 7% (H) 10/31/2010     Priority: Medium     Per provider         Hyperlipidemia LDL goal <100 10/31/2010     Priority: Medium     Per provider         Generalized anxiety disorder      Priority: Medium      Past Medical History:   Diagnosis Date     Actinic keratosis     aldara     Anxiety      Cancer (H)     squamous cell skin CA     Cauda equina spinal cord injury (H)      Chronic sinusitis 5-1-16     Diastasis recti      Esophageal reflux      Esophageal varices in cirrhosis (H) 4/1/2014    Hospitalized for UGI blee 3/28/14, endoscopy revealed bleeding varices.     Essential hypertension, benign      Intermittent asthma      Mild depression (H)      Mixed  hyperlipidemia      Nasal polyps 5-1-16     Other chronic pain      SCCA (squamous cell carcinoma) of skin      Seasonal allergic conjunctivitis      Type II or unspecified type diabetes mellitus without mention of complication, not stated as uncontrolled      Unspecified site of spinal cord injury without evidence of spinal bone injury     due to back surgery     Past Surgical History:   Procedure Laterality Date     BACK SURGERY  August 2009     C APPENDECTOMY  1974     COLONOSCOPY N/A 5/12/2016    Procedure: COMBINED COLONOSCOPY, SINGLE OR MULTIPLE BIOPSY/POLYPECTOMY BY BIOPSY;  Surgeon: Ana Paula Urbina MD;  Location:  GI     ENDOSCOPY UPPER, COLONOSCOPY, COMBINED  10/19/2011    Procedure:COMBINED ENDOSCOPY UPPER, COLONOSCOPY; Upper Endoscopy, Colonoscopy with Polypectomy x4; Surgeon:AMBAR RODRÍGUEZIQ; Location: OR     ENT SURGERY  1-2016    Ongoing since then     ESOPHAGOSCOPY, GASTROSCOPY, DUODENOSCOPY (EGD), COMBINED  3/28/2014    Procedure: COMBINED ESOPHAGOSCOPY, GASTROSCOPY, DUODENOSCOPY (EGD);  EGD, Gastric Biopsies, Esophageal Banding;  Surgeon: Reyna Tovar MD;  Location:  OR     ESOPHAGOSCOPY, GASTROSCOPY, DUODENOSCOPY (EGD), COMBINED  6/9/2014    Procedure: COMBINED ESOPHAGOSCOPY, GASTROSCOPY, DUODENOSCOPY (EGD);  Surgeon: Curtis Mendez MD;  Location:  GI     ESOPHAGOSCOPY, GASTROSCOPY, DUODENOSCOPY (EGD), COMBINED  7/24/2014    Procedure: COMBINED ESOPHAGOSCOPY, GASTROSCOPY, DUODENOSCOPY (EGD);  Surgeon: Gerard Samaniego MD;  Location:  OR     ESOPHAGOSCOPY, GASTROSCOPY, DUODENOSCOPY (EGD), COMBINED N/A 10/31/2014    Procedure: COMBINED ESOPHAGOSCOPY, GASTROSCOPY, DUODENOSCOPY (EGD);  Surgeon: Gerard Samaniego MD;  [facility]     ESOPHAGOSCOPY, GASTROSCOPY, DUODENOSCOPY (EGD), COMBINED N/A 5/12/2016    Procedure: COMBINED ESOPHAGOSCOPY, GASTROSCOPY, DUODENOSCOPY (EGD);  Surgeon: Ana Paula Urbina MD;  Location:  GI     ESOPHAGOSCOPY,  GASTROSCOPY, DUODENOSCOPY (EGD), COMBINED N/A 8/2/2018    Procedure: COMBINED ESOPHAGOSCOPY, GASTROSCOPY, DUODENOSCOPY (EGD);  EGD;  Surgeon: Yu Wagner MD;  [facility]     HCL SQUAMOUS CELL CARCINOMA AG  10/07    left forearm     HERNIORRHAPHY UMBILICAL  11/8/2012    Procedure: HERNIORRHAPHY UMBILICAL;  Open Umbilical Hernia Repair With Mesh ;  Surgeon: Chase Nicholson MD;  Location: [facility] OR     INSERT STIMULATOR DORSAL COLUMN N/A 6/28/2018    Procedure: INSERT STIMULATOR DORSAL COLUMN;;  Surgeon: Elvis Sauceda MD;  Location: [facility] OR     neuro stimulator  2010     REMOVE GENERATOR STIMULATOR (LOCATION) N/A 6/28/2018    Procedure: REMOVE GENERATOR STIMULATOR (LOCATION);  Spinal Cord Stimulator and IPG Explant and Re-Implant of SCS System (Leads and IPG);  Surgeon: Elvis Sauceda MD;  Location:  OR     SURGICAL HISTORY OF -   1/02    abcess tooth     SURGICAL HISTORY OF -   1999    L4-5 laminectomy, cauda equina syndrome     SURGICAL HISTORY OF -   +    herniated disk repair     TONSILLECTOMY  10 1964     TRANSPOSITION ULNAR NERVE (ELBOW)      right     Current Outpatient Medications   Medication Sig Dispense Refill     albuterol (PROAIR HFA/PROVENTIL HFA/VENTOLIN HFA) 108 (90 Base) MCG/ACT inhaler INHALE 2 PUFFS BY MOUTH EVERY 6 HOURS AS NEEDED FOR SHORTNESS OF BREATH/DYSPNEA OR WHEEZING 3 Inhaler 3     ammonium lactate (LAC-HYDRIN) 12 % cream Apply topically 2 times daily as needed for dry skin 385 g 3     baclofen (LIORESAL) 10 MG tablet TAKE 2 TABLETS BY MOUTH THREE TIMES DAILY 180 tablet 11     hydrochlorothiazide (HYDRODIURIL) 25 MG tablet TAKE 1 TABLET (25 MG) BY MOUTH DAILY 90 tablet 3     insulin pen needle (BD ROSE U/F) 32G X 4 MM miscellaneous Use daily and as directed. 100 each 3     oxyCODONE (ROXICODONE) 5 MG tablet Take 1 tablet (5 mg) by mouth 2 times daily (please start prior auth process if unable to fill then fill the 7 day  prescription. ) 60 tablet 0     potassium chloride ER (K-TAB) 20 MEQ CR tablet TAKE 1 TABLET (20 MEQ) BY MOUTH DAILY 90 tablet 3     promethazine (PHENERGAN) 25 MG tablet TAKE 1 TABLET (25 MG) BY MOUTH 3 TIMES DAILY AS NEEDED FOR NAUSEA 90 tablet 1     propranolol (INDERAL) 40 MG tablet TAKE 2 TABLETS (80 MG) BY MOUTH 2 TIMES DAILY 360 tablet 3     rosuvastatin (CRESTOR) 10 MG tablet TAKE 1 TABLET BY MOUTH EVERY DAY 90 tablet 3     rosuvastatin (CRESTOR) 20 MG tablet TAKE 1 TABLET BY MOUTH EVERY DAY 90 tablet 1     sertraline (ZOLOFT) 100 MG tablet TAKE 2 TABLETS BY MOUTH EVERY  tablet 3     ACE/ARB NOT PRESCRIBED, INTENTIONAL, ACE & ARB not prescribed due to Allergy       blood glucose (ACCU-CHEK KARISHMA PLUS) test strip USE TO TEST BLOOD SUGARS ONCE DAILY 100 strip 1     insulin glargine (BASAGLAR KWIKPEN) 100 UNIT/ML pen Inject 26 Units Subcutaneous daily 23.4 mL 3     OTC products: None, except as noted above    Allergies   Allergen Reactions     Lisinopril Anaphylaxis     Swollen tongue; inability to swallow; drooling; hives; swollen face, neck, angioedema     Acetaminophen      Hx of cirrhosis      Amlodipine      Swelling, hives, possible angioedema       Morphine      b/p dropped and arms went numb  Hypotension     Quinolones Hives     Bactrim [Sulfamethoxazole W/Trimethoprim] Rash     Levaquin Swelling and Rash     Swelling in lip and tongue felt thick      Latex Allergy: NO    Social History     Tobacco Use     Smoking status: Former Smoker     Packs/day: 0.50     Years: 1.00     Pack years: 0.50     Types: Cigarettes     Start date: 10/13/1983     Quit date: 1984     Years since quittin.4     Smokeless tobacco: Never Used   Substance Use Topics     Alcohol use: No     Alcohol/week: 0.0 standard drinks     Comment: a pint of alcohol / day - last drink was 3/28/14     History   Drug Use     Frequency: 2.0 times per week     Types: Marijuana     Comment: marijuana - occasional       REVIEW OF  SYSTEMS:   CONSTITUTIONAL: NEGATIVE for fever, chills, change in weight  INTEGUMENTARY/SKIN: NEGATIVE for worrisome rashes, moles or lesions  EYES: NEGATIVE for vision changes or irritation  ENT/MOUTH: NEGATIVE for ear, mouth and throat problems  RESP: NEGATIVE for significant cough or SOB  BREAST: NEGATIVE for masses, tenderness or discharge  CV: NEGATIVE for chest pain, palpitations or peripheral edema  GI: NEGATIVE for nausea, abdominal pain, heartburn, or change in bowel habits  : NEGATIVE for frequency, dysuria, or hematuria  MUSCULOSKELETAL: NEGATIVE for significant arthralgias or myalgia  NEURO: NEGATIVE for weakness, dizziness or paresthesias  ENDOCRINE: NEGATIVE for temperature intolerance, skin/hair changes  HEME: NEGATIVE for bleeding problems  PSYCHIATRIC: NEGATIVE for changes in mood or affect    EXAM:   /75 (BP Location: Left arm, Patient Position: Sitting, Cuff Size: Adult Regular)   Pulse (!) 49   Temp 97.9  F (36.6  C) (Tympanic)   Resp 18   Wt 97.5 kg (215 lb)   SpO2 96%   BMI 29.99 kg/m      GENERAL APPEARANCE: healthy, alert and no distress, obese     EYES: EOMI,  PERRL     HENT: ear canals and TM's normal and nose and mouth without ulcers or lesions     NECK: no adenopathy, no asymmetry, masses, or scars and thyroid normal to palpation     RESP: lungs clear to auscultation - no rales, rhonchi or wheezes     CV: regular rates and rhythm, normal S1 S2, no S3 or S4 and no murmur, click or rub     ABDOMEN:  soft, nontender, no HSM or masses and bowel sounds normal     MS: extremities normal- no gross deformities noted other than lesion over right achilles, no evidence of inflammation in joints, FROM in all extremities.     SKIN: no suspicious lesions or rashes     NEURO: Normal strength and tone, sensory exam grossly normal, mentation intact and speech normal     PSYCH: mentation appears normal. and affect normal/bright     LYMPHATICS: No cervical adenopathy    DIAGNOSTICS:     EKG:  appears normal, NSR, sinus bradycardia, , unchanged from previous tracings.  Labs Drawn and in Process: CBC and BMP  Unresulted Labs Ordered in the Past 30 Days of this Admission     Date and Time Order Name Status Description    11/9/2020 1403 CBC WITH PLATELETS In process     11/9/2020 1403 COMPREHENSIVE METABOLIC PANEL In process     11/9/2020 1235 COVID-19 VIRUS (CORONAVIRUS) BY PCR In process           Recent Labs   Lab Test 07/20/20  0810 01/21/20  0739 10/08/19  0842   HGB 13.4 15.0  --     177  --    INR 1.26* 1.12  --     138  --    POTASSIUM 3.5 3.6  --    CR 0.64* 0.75  --    A1C 5.8*  --  6.4*        IMPRESSION:   Reason for surgery/procedure: Right insertional calcific tendonitis to have right achilles debridement and partial calcaneus excision.    The proposed surgical procedure is considered INTERMEDIATE risk.    REVISED CARDIAC RISK INDEX  The patient has the following serious cardiovascular risks for perioperative complications such as (MI, PE, VFib and 3  AV Block):  Diabetes Mellitus (on Insulin)  INTERPRETATION: 1 risks: Class II (low risk - 0.9% complication rate)    The patient has the following additional risks for perioperative complications:  No identified additional risks      ICD-10-CM    1. Preop general physical exam  Z01.818 EKG 12-lead complete w/read - Clinics     Comprehensive metabolic panel (BMP + Alb, Alk Phos, ALT, AST, Total. Bili, TP)     CBC with platelets   2. Calcific Achilles tendinitis  M65.28    3. Type 2 diabetes mellitus with diabetic polyneuropathy, with long-term current use of insulin (H)  E11.42 A1c 5.8    Z79.4    4. Moderate persistent asthma without complication  J45.40 Controlled   5. Alcoholic cirrhosis of liver without ascites (H)  K70.30 Followed by GI. Quit drinking 2014.   6. Hyperlipidemia LDL goal <100  E78.5 On statin   7. Hypertension goal BP (blood pressure) < 140/90  I10 Controlled    8. Prolonged QT interval  R94.31 Avoid QT  prolonging medications       RECOMMENDATIONS:       Cardiovascular Risk  Performs 4 METs exercise without symptoms (Light housework (dusting, washing dishes)) .   Patient is already on a Beta Blocker. Continue Betablocker therapy after surgery, using Beta blocker order set as necessary for NPO status.    --Patient is to take all scheduled medications on the day of surgery EXCEPT for modifications listed below.    Diabetes Medication Use  -Normally takes insulin 3 pm daily,may continue to do so as surgery scheduled for am.    APPROVAL GIVEN to proceed with proposed procedure, without further diagnostic evaluation       Signed Electronically by: Murali Ramsay DO    Copy of this evaluation report is provided to requesting physician.    [place] Preop Guidelines    Revised Cardiac Risk Index

## 2020-11-10 ENCOUNTER — ANESTHESIA EVENT (OUTPATIENT)
Dept: SURGERY | Facility: AMBULATORY SURGERY CENTER | Age: 60
End: 2020-11-10

## 2020-11-10 LAB
ALBUMIN SERPL-MCNC: 3.8 G/DL (ref 3.4–5)
ALP SERPL-CCNC: 110 U/L (ref 40–150)
ALT SERPL W P-5'-P-CCNC: 25 U/L (ref 0–70)
ANION GAP SERPL CALCULATED.3IONS-SCNC: 3 MMOL/L (ref 3–14)
AST SERPL W P-5'-P-CCNC: 16 U/L (ref 0–45)
BILIRUB SERPL-MCNC: 0.4 MG/DL (ref 0.2–1.3)
BUN SERPL-MCNC: 14 MG/DL (ref 7–30)
CALCIUM SERPL-MCNC: 9.5 MG/DL (ref 8.5–10.1)
CHLORIDE SERPL-SCNC: 105 MMOL/L (ref 94–109)
CO2 SERPL-SCNC: 31 MMOL/L (ref 20–32)
CREAT SERPL-MCNC: 0.85 MG/DL (ref 0.66–1.25)
GFR SERPL CREATININE-BSD FRML MDRD: >90 ML/MIN/{1.73_M2}
GLUCOSE SERPL-MCNC: 112 MG/DL (ref 70–99)
POTASSIUM SERPL-SCNC: 4 MMOL/L (ref 3.4–5.3)
PROT SERPL-MCNC: 7.5 G/DL (ref 6.8–8.8)
SARS-COV-2 RNA SPEC QL NAA+PROBE: NOT DETECTED
SODIUM SERPL-SCNC: 139 MMOL/L (ref 133–144)
SPECIMEN SOURCE: NORMAL

## 2020-11-11 ENCOUNTER — ANESTHESIA (OUTPATIENT)
Dept: SURGERY | Facility: AMBULATORY SURGERY CENTER | Age: 60
End: 2020-11-11

## 2020-11-11 ENCOUNTER — HOSPITAL ENCOUNTER (OUTPATIENT)
Facility: AMBULATORY SURGERY CENTER | Age: 60
Discharge: HOME | End: 2020-11-11
Attending: ORTHOPAEDIC SURGERY | Admitting: ORTHOPAEDIC SURGERY
Payer: MEDICARE

## 2020-11-11 VITALS
HEIGHT: 71 IN | BODY MASS INDEX: 30.24 KG/M2 | SYSTOLIC BLOOD PRESSURE: 138 MMHG | OXYGEN SATURATION: 99 % | RESPIRATION RATE: 15 BRPM | TEMPERATURE: 98.1 F | HEART RATE: 68 BPM | DIASTOLIC BLOOD PRESSURE: 90 MMHG | WEIGHT: 216 LBS

## 2020-11-11 DIAGNOSIS — M25.571 RIGHT ANKLE PAIN: ICD-10-CM

## 2020-11-11 DIAGNOSIS — G89.29 CHRONIC PAIN OF RIGHT ANKLE: Primary | ICD-10-CM

## 2020-11-11 DIAGNOSIS — M25.571 CHRONIC PAIN OF RIGHT ANKLE: Primary | ICD-10-CM

## 2020-11-11 DIAGNOSIS — M25.571 ACUTE RIGHT ANKLE PAIN: ICD-10-CM

## 2020-11-11 LAB — GLUCOSE BLDC GLUCOMTR-MCNC: 128 MG/DL (ref 70–99)

## 2020-11-11 PROCEDURE — 28120 PART REMOVAL OF ANKLE/HEEL: CPT | Mod: RT

## 2020-11-11 PROCEDURE — 27630 REMOVAL OF TENDON LESION: CPT | Mod: RT

## 2020-11-11 DEVICE — IMPLANTABLE DEVICE: Type: IMPLANTABLE DEVICE | Site: HEEL | Status: FUNCTIONAL

## 2020-11-11 RX ORDER — NALOXONE HYDROCHLORIDE 0.4 MG/ML
.1-.4 INJECTION, SOLUTION INTRAMUSCULAR; INTRAVENOUS; SUBCUTANEOUS
Status: DISCONTINUED | OUTPATIENT
Start: 2020-11-11 | End: 2020-11-11 | Stop reason: HOSPADM

## 2020-11-11 RX ORDER — ONDANSETRON 4 MG/1
4 TABLET, ORALLY DISINTEGRATING ORAL EVERY 30 MIN PRN
Status: DISCONTINUED | OUTPATIENT
Start: 2020-11-11 | End: 2020-11-12 | Stop reason: HOSPADM

## 2020-11-11 RX ORDER — CEFAZOLIN SODIUM 2 G/50ML
2 SOLUTION INTRAVENOUS
Status: COMPLETED | OUTPATIENT
Start: 2020-11-11 | End: 2020-11-11

## 2020-11-11 RX ORDER — BUPIVACAINE HYDROCHLORIDE 2.5 MG/ML
INJECTION, SOLUTION EPIDURAL; INFILTRATION; INTRACAUDAL PRN
Status: DISCONTINUED | OUTPATIENT
Start: 2020-11-11 | End: 2020-11-11

## 2020-11-11 RX ORDER — PROPOFOL 10 MG/ML
INJECTION, EMULSION INTRAVENOUS CONTINUOUS PRN
Status: DISCONTINUED | OUTPATIENT
Start: 2020-11-11 | End: 2020-11-11

## 2020-11-11 RX ORDER — ACETAMINOPHEN 325 MG/1
975 TABLET ORAL ONCE
Status: DISCONTINUED | OUTPATIENT
Start: 2020-11-11 | End: 2020-11-11 | Stop reason: HOSPADM

## 2020-11-11 RX ORDER — FENTANYL CITRATE 50 UG/ML
25-50 INJECTION, SOLUTION INTRAMUSCULAR; INTRAVENOUS
Status: DISCONTINUED | OUTPATIENT
Start: 2020-11-11 | End: 2020-11-12 | Stop reason: HOSPADM

## 2020-11-11 RX ORDER — DEXAMETHASONE SODIUM PHOSPHATE 4 MG/ML
INJECTION, SOLUTION INTRA-ARTICULAR; INTRALESIONAL; INTRAMUSCULAR; INTRAVENOUS; SOFT TISSUE PRN
Status: DISCONTINUED | OUTPATIENT
Start: 2020-11-11 | End: 2020-11-11

## 2020-11-11 RX ORDER — LIDOCAINE 40 MG/G
CREAM TOPICAL
Status: DISCONTINUED | OUTPATIENT
Start: 2020-11-11 | End: 2020-11-11 | Stop reason: HOSPADM

## 2020-11-11 RX ORDER — GLYCOPYRROLATE 0.2 MG/ML
INJECTION, SOLUTION INTRAMUSCULAR; INTRAVENOUS PRN
Status: DISCONTINUED | OUTPATIENT
Start: 2020-11-11 | End: 2020-11-11

## 2020-11-11 RX ORDER — OXYCODONE HYDROCHLORIDE 5 MG/1
5 TABLET ORAL EVERY 4 HOURS PRN
Status: DISCONTINUED | OUTPATIENT
Start: 2020-11-11 | End: 2020-11-12 | Stop reason: HOSPADM

## 2020-11-11 RX ORDER — NALOXONE HYDROCHLORIDE 0.4 MG/ML
.1-.4 INJECTION, SOLUTION INTRAMUSCULAR; INTRAVENOUS; SUBCUTANEOUS
Status: DISCONTINUED | OUTPATIENT
Start: 2020-11-11 | End: 2020-11-12 | Stop reason: HOSPADM

## 2020-11-11 RX ORDER — LIDOCAINE HYDROCHLORIDE 20 MG/ML
INJECTION, SOLUTION INFILTRATION; PERINEURAL PRN
Status: DISCONTINUED | OUTPATIENT
Start: 2020-11-11 | End: 2020-11-11

## 2020-11-11 RX ORDER — FENTANYL CITRATE 50 UG/ML
25-50 INJECTION, SOLUTION INTRAMUSCULAR; INTRAVENOUS
Status: DISCONTINUED | OUTPATIENT
Start: 2020-11-11 | End: 2020-11-11 | Stop reason: HOSPADM

## 2020-11-11 RX ORDER — CEFAZOLIN SODIUM 1 G/50ML
1 SOLUTION INTRAVENOUS SEE ADMIN INSTRUCTIONS
Status: DISCONTINUED | OUTPATIENT
Start: 2020-11-11 | End: 2020-11-11 | Stop reason: HOSPADM

## 2020-11-11 RX ORDER — GABAPENTIN 300 MG/1
300 CAPSULE ORAL ONCE
Status: COMPLETED | OUTPATIENT
Start: 2020-11-11 | End: 2020-11-11

## 2020-11-11 RX ORDER — KETOROLAC TROMETHAMINE 30 MG/ML
INJECTION, SOLUTION INTRAMUSCULAR; INTRAVENOUS PRN
Status: DISCONTINUED | OUTPATIENT
Start: 2020-11-11 | End: 2020-11-11

## 2020-11-11 RX ORDER — ONDANSETRON 2 MG/ML
INJECTION INTRAMUSCULAR; INTRAVENOUS PRN
Status: DISCONTINUED | OUTPATIENT
Start: 2020-11-11 | End: 2020-11-11

## 2020-11-11 RX ORDER — OXYCODONE HYDROCHLORIDE 5 MG/1
5 TABLET ORAL
Status: DISCONTINUED | OUTPATIENT
Start: 2020-11-11 | End: 2020-11-12 | Stop reason: HOSPADM

## 2020-11-11 RX ORDER — OXYCODONE HYDROCHLORIDE 5 MG/1
5-10 TABLET ORAL EVERY 4 HOURS PRN
Qty: 26 TABLET | Refills: 0 | Status: SHIPPED | OUTPATIENT
Start: 2020-11-11 | End: 2020-12-31

## 2020-11-11 RX ORDER — ONDANSETRON 2 MG/ML
4 INJECTION INTRAMUSCULAR; INTRAVENOUS EVERY 30 MIN PRN
Status: DISCONTINUED | OUTPATIENT
Start: 2020-11-11 | End: 2020-11-12 | Stop reason: HOSPADM

## 2020-11-11 RX ORDER — SODIUM CHLORIDE, SODIUM LACTATE, POTASSIUM CHLORIDE, CALCIUM CHLORIDE 600; 310; 30; 20 MG/100ML; MG/100ML; MG/100ML; MG/100ML
INJECTION, SOLUTION INTRAVENOUS CONTINUOUS
Status: DISCONTINUED | OUTPATIENT
Start: 2020-11-11 | End: 2020-11-11 | Stop reason: HOSPADM

## 2020-11-11 RX ORDER — FENTANYL CITRATE 50 UG/ML
INJECTION, SOLUTION INTRAMUSCULAR; INTRAVENOUS PRN
Status: DISCONTINUED | OUTPATIENT
Start: 2020-11-11 | End: 2020-11-11

## 2020-11-11 RX ORDER — HYDROXYZINE HYDROCHLORIDE 25 MG/1
25 TABLET, FILM COATED ORAL
Status: DISCONTINUED | OUTPATIENT
Start: 2020-11-11 | End: 2020-11-12 | Stop reason: HOSPADM

## 2020-11-11 RX ORDER — FLUMAZENIL 0.1 MG/ML
0.2 INJECTION, SOLUTION INTRAVENOUS
Status: DISCONTINUED | OUTPATIENT
Start: 2020-11-11 | End: 2020-11-11 | Stop reason: HOSPADM

## 2020-11-11 RX ORDER — MORPHINE SULFATE 15 MG/1
15 TABLET, FILM COATED, EXTENDED RELEASE ORAL EVERY 12 HOURS
Qty: 10 TABLET | Refills: 0 | Status: SHIPPED | OUTPATIENT
Start: 2020-11-11 | End: 2020-11-16

## 2020-11-11 RX ORDER — MEPERIDINE HYDROCHLORIDE 25 MG/ML
12.5 INJECTION INTRAMUSCULAR; INTRAVENOUS; SUBCUTANEOUS
Status: DISCONTINUED | OUTPATIENT
Start: 2020-11-11 | End: 2020-11-12 | Stop reason: HOSPADM

## 2020-11-11 RX ORDER — SODIUM CHLORIDE, SODIUM LACTATE, POTASSIUM CHLORIDE, CALCIUM CHLORIDE 600; 310; 30; 20 MG/100ML; MG/100ML; MG/100ML; MG/100ML
INJECTION, SOLUTION INTRAVENOUS CONTINUOUS
Status: DISCONTINUED | OUTPATIENT
Start: 2020-11-11 | End: 2020-11-12 | Stop reason: HOSPADM

## 2020-11-11 RX ORDER — PROPOFOL 10 MG/ML
INJECTION, EMULSION INTRAVENOUS PRN
Status: DISCONTINUED | OUTPATIENT
Start: 2020-11-11 | End: 2020-11-11

## 2020-11-11 RX ADMIN — KETOROLAC TROMETHAMINE 30 MG: 30 INJECTION, SOLUTION INTRAMUSCULAR; INTRAVENOUS at 11:14

## 2020-11-11 RX ADMIN — GLYCOPYRROLATE 0.2 MG: 0.2 INJECTION, SOLUTION INTRAMUSCULAR; INTRAVENOUS at 11:06

## 2020-11-11 RX ADMIN — PROPOFOL 50 MG: 10 INJECTION, EMULSION INTRAVENOUS at 11:12

## 2020-11-11 RX ADMIN — SODIUM CHLORIDE, SODIUM LACTATE, POTASSIUM CHLORIDE, CALCIUM CHLORIDE: 600; 310; 30; 20 INJECTION, SOLUTION INTRAVENOUS at 09:44

## 2020-11-11 RX ADMIN — ONDANSETRON 4 MG: 2 INJECTION INTRAMUSCULAR; INTRAVENOUS at 11:06

## 2020-11-11 RX ADMIN — PROPOFOL 50 MG: 10 INJECTION, EMULSION INTRAVENOUS at 11:55

## 2020-11-11 RX ADMIN — FENTANYL CITRATE 100 MCG: 50 INJECTION, SOLUTION INTRAMUSCULAR; INTRAVENOUS at 11:06

## 2020-11-11 RX ADMIN — CEFAZOLIN SODIUM 2 G: 2 SOLUTION INTRAVENOUS at 11:20

## 2020-11-11 RX ADMIN — PROPOFOL 50 MG: 10 INJECTION, EMULSION INTRAVENOUS at 11:15

## 2020-11-11 RX ADMIN — FENTANYL CITRATE 50 MCG: 50 INJECTION, SOLUTION INTRAMUSCULAR; INTRAVENOUS at 10:23

## 2020-11-11 RX ADMIN — GABAPENTIN 300 MG: 300 CAPSULE ORAL at 09:26

## 2020-11-11 RX ADMIN — Medication 100 MCG: at 11:18

## 2020-11-11 RX ADMIN — DEXAMETHASONE SODIUM PHOSPHATE 4 MG: 4 INJECTION, SOLUTION INTRA-ARTICULAR; INTRALESIONAL; INTRAMUSCULAR; INTRAVENOUS; SOFT TISSUE at 11:06

## 2020-11-11 RX ADMIN — BUPIVACAINE HYDROCHLORIDE 10 ML: 2.5 INJECTION, SOLUTION EPIDURAL; INFILTRATION; INTRACAUDAL at 10:25

## 2020-11-11 RX ADMIN — Medication 200 MCG: at 11:44

## 2020-11-11 RX ADMIN — PROPOFOL 50 MG: 10 INJECTION, EMULSION INTRAVENOUS at 11:24

## 2020-11-11 RX ADMIN — LIDOCAINE HYDROCHLORIDE 50 MG: 20 INJECTION, SOLUTION INFILTRATION; PERINEURAL at 11:11

## 2020-11-11 RX ADMIN — PROPOFOL 200 MCG/KG/MIN: 10 INJECTION, EMULSION INTRAVENOUS at 11:16

## 2020-11-11 RX ADMIN — PROPOFOL 50 MG: 10 INJECTION, EMULSION INTRAVENOUS at 11:14

## 2020-11-11 RX ADMIN — PROPOFOL 50 MG: 10 INJECTION, EMULSION INTRAVENOUS at 11:13

## 2020-11-11 ASSESSMENT — MIFFLIN-ST. JEOR: SCORE: 1806.9

## 2020-11-11 NOTE — DISCHARGE INSTRUCTIONS
"[facility]  Home Care Following Anesthesia  For 24 hours after surgery:  1. Get plenty of rest.  A responsible adult must stay with you for at least 24 hours after you leave the surgery center.  2. Do not drive or use heavy equipment.  If you have weakness or tingling, don't drive or use heavy equipment until this feeling goes away.   3. Do not drink alcohol.   4. Avoid strenuous or risky activities.  Ask for help when climbing stairs.  5. You may feel lightheaded.  IF so, sit for a few minutes before standing.  Have someone help you get up.   6. If you have nausea (feel sick to your stomach): Drink only clear liquids such as apple juice, ginger ale, broth or 7-Up.  Rest may also help.  Be sure to drink enough fluids.  Move to a regular diet as you feel able.   7. You may have a slight fever.  Call the doctor if your fever is over 100 F (37.7 C) (taken under the tongue) or lasts longer than 24 hours.  8. You may have a dry mouth, a sore throat, muscle aches or trouble sleeping. These should go away after 24 hours.  9. Do not make important or legal decisions.     Today you received an Exparel block to numb the nerves near your surgery site.  This is a block using local anesthetic or \"numbing\" medication injected around the nerves to anesthetize or \"numb\" the area supplied by those nerves.  This block is injected into the muscle layer near your surgical site.  This medication may numb the location where you had surgery up to 72 hours.  If your surgical site is an arm or leg you should be careful with your affected limb, since it is possible to injure your limb without being aware of it due to the numbing.  Until full feeling returns, you should guard against bumping or hitting your limb, and avoid extreme hot or cold temperatures on the skin.  As the block wears off, the feeling will return as a tingling or prickly sensation near your surgical site.  You will experince more " discomfort from your incision as the feeling returns.  You may want to take a pain pill (a narcotic or Tylenol if this was prescribed by your surgeon) when you start to experience mild pain before the pain beomes more severe.  If your pain medications do not control your pain, you should notify your surgeon.    Tips for taking pain medications  To get the best pain relief possible, remember these points:    Take pain medications as directed, before pain becomes severe.    Pain medication can upset your stomach: taking it with food may help.    Constipation is a common side effect of pain medication. Drink plenty of  fluids.    Eat foods high in fiber. Take a stool softener if recommended by your doctor or pharmacist.    Do not drink alcohol, drive or operate machinery while taking pain medications.    Ask about other ways to control pain, such as with heat, ice or relaxation.    Tylenol/Acetaminophen Consumption  To help encourage the safe use of acetaminophen, the makers of TYLENOL  have lowered the maximum daily dose for single-ingredient Extra Strength TYLENOL  (acetaminophen) products sold in the [place] from 8 pills per day (4,000 mg) to 6 pills per day (3,000 mg). The dosing interval has also changed from 2 pills every 4-6 hours to 2 pills every 6 hours.    If you feel your pain relief is insufficient, you may take Tylenol/Acetaminophen in addition to your narcotic pain medication.     Be careful not to exceed 3,000 mg of Tylenol/Acetaminophen in a 24 hour period from all sources.    If you are taking extra strength Tylenol/acetaminophen (500 mg), the maximum dose is 6 tablets in 24 hours.    If you are taking regular strength acetaminophen (325 mg), the maximum dose is 9 tablets in 24 hours.    Call a doctor for any of the followin. Signs of infection (fever, growing tenderness at the surgery site, a large amount of drainage or bleeding, severe pain, foul-smelling drainage, redness, swelling).  2. It has  been over 8 to 10 hours since surgery and you are still not able to urinate (pass water).  3. Headache for over 24 hours.  4. Numbness, tingling or weakness the day after surgery (if you had spinal anesthesia).  5. Signs of Covid-19 infection (temperature over 100 degrees, shortness of breath, cough, loss of taste/smell, generalized body aches, persistent headache, chills, sore throat, nausea/vomiting/diarrhea)  Your doctor is:  ***  Dr. hE Sandoval, Orthopaedics: [phone]               Or dial [phone] and ask for the resident on call for:    For emergency care, call the:  [facility]: [phone] (TTY for hearing impaired: [phone])

## 2020-11-11 NOTE — ANESTHESIA POSTPROCEDURE EVALUATION
Anesthesia POST Procedure Evaluation    Patient: Erwin Aguilar   MRN:     [id number] Gender:   male   Age:    60 year old :      1960        Preoperative Diagnosis: Right ankle pain [M25.571]   Procedure(s):  Right achilles debridement and partial calcaneus excision   Postop Comments: No value filed.     Anesthesia Type: General, Peripheral Nerve Block, For Post-op pain in coordination with surgeon       Disposition: Outpatient   Postop Pain Control: Uneventful            Sign Out: Well controlled pain   PONV: No   Neuro/Psych: Uneventful            Sign Out: Acceptable/Baseline neuro status   Airway/Respiratory: Uneventful            Sign Out: Acceptable/Baseline resp. status   CV/Hemodynamics: Uneventful            Sign Out: Acceptable CV status   Other NRE: NONE   DID A NON-ROUTINE EVENT OCCUR? No         Last Anesthesia Record Vitals:  CRNA VITALS  2020 1134 - 2020 1234      2020             Pulse:  73    SpO2:  92 %    Resp Rate (set):  10          Last PACU Vitals:  Vitals Value Taken Time   /72 20 1223   Temp 36.7  C (98.1  F) 20 1223   Pulse 64 20 1224   Resp 9 20 1224   SpO2 95 % 20 1224   Temp src     NIBP     Pulse     SpO2     Resp     Temp     Ht Rate     Temp 2     Vitals shown include unvalidated device data.      Electronically Signed By: Gustavo Odonnell MD, 2020, 1:27 PM

## 2020-11-11 NOTE — BRIEF OP NOTE
[facility] [place]    Brief Operative Note    Pre-operative diagnosis: Right ankle pain [M25.571]  Post-operative diagnosis insertional calcific achilles tendonitis     Procedure: Procedure(s):  Right achilles debridement and partial calcaneus excision  Surgeon: Surgeon(s) and Role:     * Eh Sandoval MD - Primary     * Lorrie Gomez PA-C - Assisting  Anesthesia: General   Estimated blood loss: 20 ml  Drains: None  Specimens: * No specimens in log *  Findings:   None.  Complications: None.  Implants:   Implant Name Type Inv. Item Serial No.  Lot No. LRB No. Used Action   Suture [employer], BioComposite Corkscrew FT with #1 FiberWire and #1 TigerWire, 4.5 x 14mm    [employer] [id number] Right 2 Implanted       Plan:  Same Day surgery discharge to home once criteria met.  Cast to remain on right  lower extremity and  NWB at all times.  Oxycodone and MScontin for pain.  No dressing change on own.  Leave dressing on until 2 weeks follow up appointment.  F/U in clinic in 2 weeks    I was asked by Dr. Sandoval to assist with surgery. I positioned and prepped the patient. I retracted soft tissue.   I suctioned fluids when needed. I provided traction for dissection. I helped to ligate blood vessels. I helped Dr. Sandoval identify and protect important structures. The procedure was medically necessary for an assistant because Dr. Sandoval needed the operative exposure and assistance that I provided. This allowed him to safely and efficiently operate. It was also important that I help ligate blood vessels to maintain hemostasis and reduce the bleeding risk. I helped with the closure of the operative incisions as well as helping with the boot/cast/splint.  The assistance that I provided reduced operative time which meant less general anesthetic for the patient. No qualified residents were available to assist.    Lorrie Gomez PA-C PA-C

## 2020-11-11 NOTE — ANESTHESIA PREPROCEDURE EVALUATION
Anesthesia Pre-Procedure Evaluation    Patient: Erwin Aguilar   MRN:     [id number] Gender:   male   Age:    60 year old :      1960        Preoperative Diagnosis: Right ankle pain [M25.571]   Procedure(s):  Right achilles debridement and partial calcaneus excision     LABS:  CBC:   Lab Results   Component Value Date    WBC 7.2 2020    WBC 7.0 2020    HGB 13.5 2020    HGB 13.4 2020    HCT 39.2 (L) 2020    HCT 39.2 (L) 2020     2020     2020     BMP:   Lab Results   Component Value Date     2020     2020    POTASSIUM 4.0 2020    POTASSIUM 3.5 2020    CHLORIDE 105 2020    CHLORIDE 105 2020    CO2 31 2020    CO2 27 2020    BUN 14 2020    BUN 11 2020    CR 0.85 2020    CR 0.64 (L) 2020     (H) 2020     (H) 2020     COAGS:   Lab Results   Component Value Date    PTT 36 2014    INR 1.26 (H) 2020     POC:   Lab Results   Component Value Date     (H) 2020     OTHER:   Lab Results   Component Value Date    LACT 2.0 2014    A1C 5.8 (H) 2020    AFR[place] 9.5 2020    PHOS 2.9 2013    MAG 1.9 2015    ALBUMIN 3.8 2020    PROTTOTAL 7.5 2020    ALT 25 2020    AST 16 2020    ALKPHOS 110 2020    BILITOTAL 0.4 2020    LIPASE 102 2018    TSH 1.36 10/09/2018    CRP 6.4 08/10/2015    SED 34 (H) 08/10/2015        Preop Vitals    BP Readings from Last 3 Encounters:   20 (!) 143/84   20 123/75   20 (!) 174/78    Pulse Readings from Last 3 Encounters:   20 (!) 6   20 (!) 49   20 57      Resp Readings from Last 3 Encounters:   20 10   20 18   10/08/19 18    SpO2 Readings from Last 3 Encounters:   20 97%   20 96%   20 98%      Temp Readings from Last 1 Encounters:   20 35.4  C (95.7  F) (Temporal)    Ht  "Readings from Last 1 Encounters:   11/11/20 1.803 m (5' 11\")      Wt Readings from Last 1 Encounters:   11/11/20 98 kg (216 lb)    Estimated body mass index is 30.13 kg/m  as calculated from the following:    Height as of this encounter: 1.803 m (5' 11\").    Weight as of this encounter: 98 kg (216 lb).     LDA:  Peripheral IV 11/11/20 Right Hand (Active)   Site Assessment WDL 11/11/20 0944   Line Status Infusing 11/11/20 0944   Dressing Intervention New dressing  11/11/20 0944   Phlebitis Scale 0-->no symptoms 11/11/20 0944   Number of days: 0       Peripheral IV 08/02/18 Right Hand (Active)   Number of days: 832       Supraglottic Airway (Active)   Number of days: 0        Past Medical History:   Diagnosis Date     Actinic keratosis     aldara     Anxiety      Cancer (H)     squamous cell skin CA     Cauda equina spinal cord injury (H)      Chronic sinusitis 5-1-16     Diastasis recti      Esophageal reflux      Esophageal varices in cirrhosis (H) 4/1/2014    Hospitalized for UGI blee 3/28/14, endoscopy revealed bleeding varices.     Essential hypertension, benign      Intermittent asthma      Mild depression (H)      Mixed hyperlipidemia      Nasal polyps 5-1-16     Other chronic pain      SCCA (squamous cell carcinoma) of skin      Seasonal allergic conjunctivitis      Type II or unspecified type diabetes mellitus without mention of complication, not stated as uncontrolled      Unspecified site of spinal cord injury without evidence of spinal bone injury     due to back surgery      Past Surgical History:   Procedure Laterality Date     BACK SURGERY  August 2009     C APPENDECTOMY  1974     COLONOSCOPY N/A 5/12/2016    Procedure: COMBINED COLONOSCOPY, SINGLE OR MULTIPLE BIOPSY/POLYPECTOMY BY BIOPSY;  Surgeon: Ana Paula Urbina MD;  Location:  GI     ENDOSCOPY UPPER, COLONOSCOPY, COMBINED  10/19/2011    Procedure:COMBINED ENDOSCOPY UPPER, COLONOSCOPY; Upper Endoscopy, Colonoscopy with " Polypectomy x4; Surgeon:AMBAR RODRÍGUEZ; Location:[facility] OR     ENT SURGERY  1-2016    Ongoing since then     ESOPHAGOSCOPY, GASTROSCOPY, DUODENOSCOPY (EGD), COMBINED  3/28/2014    Procedure: COMBINED ESOPHAGOSCOPY, GASTROSCOPY, DUODENOSCOPY (EGD);  EGD, Gastric Biopsies, Esophageal Banding;  Surgeon: Reyna Tovar MD;  Location: [facility] OR     ESOPHAGOSCOPY, GASTROSCOPY, DUODENOSCOPY (EGD), COMBINED  6/9/2014    Procedure: COMBINED ESOPHAGOSCOPY, GASTROSCOPY, DUODENOSCOPY (EGD);  Surgeon: Curtis Mendez MD;  Location: [facility] GI     ESOPHAGOSCOPY, GASTROSCOPY, DUODENOSCOPY (EGD), COMBINED  7/24/2014    Procedure: COMBINED ESOPHAGOSCOPY, GASTROSCOPY, DUODENOSCOPY (EGD);  Surgeon: eGrard Samaniego MD;  Location: [facility] OR     ESOPHAGOSCOPY, GASTROSCOPY, DUODENOSCOPY (EGD), COMBINED N/A 10/31/2014    Procedure: COMBINED ESOPHAGOSCOPY, GASTROSCOPY, DUODENOSCOPY (EGD);  Surgeon: Gerard Samaniego MD;  Location: [facility] OR     ESOPHAGOSCOPY, GASTROSCOPY, DUODENOSCOPY (EGD), COMBINED N/A 5/12/2016    Procedure: COMBINED ESOPHAGOSCOPY, GASTROSCOPY, DUODENOSCOPY (EGD);  Surgeon: Ana Paula Urbina MD;  Location: [facility] GI     ESOPHAGOSCOPY, GASTROSCOPY, DUODENOSCOPY (EGD), COMBINED N/A 8/2/2018    Procedure: COMBINED ESOPHAGOSCOPY, GASTROSCOPY, DUODENOSCOPY (EGD);  EGD;  Surgeon: Yu Wagner MD;  Location: [facility] GI     HCL SQUAMOUS CELL CARCINOMA AG  10/07    left forearm     HERNIORRHAPHY UMBILICAL  11/8/2012    Procedure: HERNIORRHAPHY UMBILICAL;  Open Umbilical Hernia Repair With Mesh ;  Surgeon: Chase Nicholson MD;  Location: [facility] OR     INSERT STIMULATOR DORSAL COLUMN N/A 6/28/2018    Procedure: INSERT STIMULATOR DORSAL COLUMN;;  Surgeon: Elvis Sauceda MD;  Location: [facility] OR     neuro stimulator  2010     REMOVE GENERATOR STIMULATOR (LOCATION) N/A 6/28/2018    Procedure: REMOVE GENERATOR STIMULATOR (LOCATION);  Spinal Cord Stimulator and IPG Explant and Re-Implant of SCS System  (Leads and IPG);  Surgeon: Elvis Sauceda MD;  Location: [facility] OR     SURGICAL HISTORY OF -   1/02    abcess tooth     SURGICAL HISTORY OF -   1999    L4-5 laminectomy, cauda equina syndrome     SURGICAL HISTORY OF -   +    herniated disk repair     TONSILLECTOMY  10 1964     TRANSPOSITION ULNAR NERVE (ELBOW)      right      Allergies   Allergen Reactions     Levaquin Anaphylaxis and Rash     Swelling in lip and tongue felt thick     Lisinopril Anaphylaxis     Swollen tongue; inability to swallow; drooling; hives; swollen face, neck, angioedema     Acetaminophen      Hx of cirrhosis      Amlodipine      Swelling, hives, possible angioedema       Morphine      b/p dropped and arms went numb  Hypotension     Quinolones Hives     Bactrim [Sulfamethoxazole W/Trimethoprim] Rash        Anesthesia Evaluation     .             ROS/MED HX    ENT/Pulmonary:     (+)Moderate Persistent asthma , . .    Neurologic: Comment: Cauda equina syndrome with neurogenic bladder      Cardiovascular:     (+) Dyslipidemia, hypertension----. : . . . :. . Previous cardiac testing date:results:date: results:ECG reviewed date:11/09/20 results:Sinus dieudonne date: results:          METS/Exercise Tolerance:     Hematologic:  - neg hematologic  ROS       Musculoskeletal: Comment: S/p spinal cord stimulator        GI/Hepatic: Comment: Cirrhosis  Esophageal varices    (+) GERD liver disease,       Renal/Genitourinary:         Endo:     (+) type II DM .      Psychiatric:  - neg psychiatric ROS       Infectious Disease:  - neg infectious disease ROS       Malignancy:      - no malignancy   Other: Comment: H/o EtOH abuse   (+) H/O Chronic Pain,                       PHYSICAL EXAM:   Mental Status/Neuro: A/A/O   Airway:   Mallampati: I  Mouth/Opening: Full  TM distance: > 6 cm  Neck ROM: Full   Respiratory: Auscultation: CTAB     Resp. Rate: Normal     Resp. Effort: Normal      CV: Rhythm: Regular  Rate: Age appropriate  Heart: Normal  Sounds  Edema: None   Comments:      Dental: Normal Dentition                Assessment:   ASA SCORE: 3    H&P: History and physical reviewed and following examination; no interval change.   Smoking Status:  Non-Smoker/Unknown   NPO Status: NPO Appropriate     Plan:   Anes. Type:  General; Peripheral Nerve Block; For Post-op pain in coordination with surgeon     Block Details: Exparel; Single Shot; Popliteal   Pre-Medication: None   Induction:  IV (Standard)   Airway: LMA   Access/Monitoring: PIV   Maintenance: Balanced     Postop Plan:   Postop Pain: Opioids; Regional  Postop Sedation/Airway: Not planned  Disposition: Outpatient     PONV Management:   Adult Risk Factors:, Non-Smoker, Postop Opioids   Prevention: Ondansetron     CONSENT: Direct conversation   Plan and risks discussed with: Patient   Blood Products: Consent Deferred (Minimal Blood Loss)                   Gustavo Odonnell MD

## 2020-11-11 NOTE — PROGRESS NOTES
Patient received right side Popliteal nerve block  with Exparel.  Fentanyl 50mcg and Versed 1mg given. Tolerated procedure well.

## 2020-11-11 NOTE — ANESTHESIA CARE TRANSFER NOTE
Patient: Erwin Aguilar    Procedure(s):  Right achilles debridement and partial calcaneus excision    Diagnosis: Right ankle pain [M25.571]  Diagnosis Additional Information: No value filed.    Anesthesia Type:   General, Peripheral Nerve Block, For Post-op pain in coordination with surgeon     Note:  Airway :Room Air  Patient transferred to:PACU  Comments: Uneventful transport to PACU; VSS; Report given to RN; Pt comfortable; IV patent: pt exchanging wellHandoff Report: Identifed the Patient, Identified the Reponsible Provider, Reviewed the pertinent medical history, Discussed the surgical course, Reviewed Intra-OP anesthesia mangement and issues during anesthesia, Set expectations for post-procedure period and Allowed opportunity for questions and acknowledgement of understanding      Vitals: (Last set prior to Anesthesia Care Transfer)    CRNA VITALS  11/11/2020 1134 - 11/11/2020 1210      11/11/2020             Pulse:  73    SpO2:  92 %    Resp Rate (set):  10                Electronically Signed By: VANCE Ceja CRNA  November 11, 2020  12:10 PM

## 2020-11-11 NOTE — ANESTHESIA PROCEDURE NOTES
Peripheral Nerve Block Procedure Note      Staff -   Anesthesiologist:  Gustavo Odonnell MD  Performed By: anesthesiologist  Location: Pre-op  Procedure Start/Stop TImes:      11/11/2020 10:20 AM     11/11/2020 10:30 AM    patient identified, IV checked, site marked, risks and benefits discussed, informed consent, monitors and equipment checked, pre-op evaluation, at physician/surgeon's request and post-op pain management      Correct Patient: Yes      Correct Position: Yes      Correct Site: Yes      Correct Procedure: Yes      Correct Laterality:  Yes    Site Marked:  Yes  Procedure details:     Procedure:  Popliteal    ASA:  2    Laterality:  Right    Position:  Supine    Sterile Prep: chloraprep, mask and sterile gloves      Local skin infiltration:  None    Needle:  Insulated    Needle gauge:  21    Needle length (mm):  110    Ultrasound: Yes      Ultrasound used to identify targeted nerve, plexus, or vascular structure and placed a needle adjacent to it      Permanent Image entered into patiient's record      Abnormal pain on injection: No      Blood Aspirated: No      Paresthesias:  No    Bleeding at site: No      Bolus via:  Needle    Infusion Method:  Single Shot    Complications:  None

## 2020-11-11 NOTE — OP NOTE
Procedure Date: 11/11/2020      PREOPERATIVE DIAGNOSIS:  Right Achilles insertional calcific tendonitis.      POSTOPERATIVE DIAGNOSIS:  Right Achilles insertional calcific tendonitis.      PROCEDURES:   1.  Right Achilles tendon debridement.   2.  Right calcaneus partial excision.   3.  Right Achilles tendon reattachment.      SURGEON:  Eh Sandoval MD      ASSISTANT:  Lorrie Gomez PA-C  Mrs. Gomez's assistance was required in order to provide help with positioning of the patient, the surgery itself, holding retractors, closure of the wound and application of immobilization devices.  At the time of surgery, there was no available help from an orthopedic trainee.       COMPLICATIONS:  None.      DRAINS:  None.      ESTIMATED BLOOD LOSS:  Less than 20 mL.      ANESTHESIA:  General endotracheal.      INDICATIONS:  Please refer to clinic notes for further details regarding the indications for Mr. Aguilar's case.       PROCEDURE NOTE:  On 11/11/2020, the patient was taken to surgery.  Preoperative antibiotics were administered to the patient prior to arrival to the OR.      After successful induction of general endotracheal anesthesia, he was placed supine on the operating table.  The right lower extremity was prepped and draped in a sterile fashion.  After exsanguination by gravity, the tourniquet cuff was inflated to 300 mmHg on the proximal third of the right thigh.      The pause for the cause was performed according to our institution's policy, which confirmed laterality of the procedure.        An incision was made along the posterior midline of the right heel.  Subcutaneous tissues were dissected.  The Achilles tendon was identified, and it was detached from the calcaneus tuberosity.  A number of ossicles were identified within the Achilles tendon, which were resected individually as well as the most anterior portion of the Achilles tendon in order to avoid any diseased fibers.      With an  oscillating saw, we proceeded with resection of the most posterior and superior aspect of the calcaneus tuberosity.  Special attention was placed to file down the edges in order to avoid any sharp edges.  This was done with a file.      We proceeded with placement of 2 anchors, a 4.5 mm bioabsorbable corkscrew from [employer] with a total of 80 strands of suture material.  The patient presented with tremendously soft and osteopenic bone, as we did not require to drill into the bone in order to provide attachments to the anchors.  However, they were felt to be well secured by tugging on it without any pullback.      Eventually, the tendon was reattached.  Two of the sutures failed during the reattachment; therefore, he was left with 6 strands of suture for reattachment of the calcaneus tuberosity.      The tourniquet cuff was deflated.  Satisfactory hemostasis was accomplished.  The wound was closed in layers.  Sterile dressings were applied.  The patient was placed in a short leg cast in a resting position, which was approximately 10 degrees of plantar flexion.  The patient was transferred in stable condition to PACU.      PLAN:  The patient will remain nonweightbearing x6 weeks.  He will return to clinic in 2 weeks for a wound check, and sutures will be removed if indicated.  The patient then will be placed in a second short leg cast in a slightly more dorsiflexed position, which will be exchanged at 4 weeks from surgery.  The patient will err into too much plantarflexion than dorsiflexion during the early postoperative course.  The patient will be reevaluated at 6 weeks from surgery, and at that time no x-rays will be obtained.        IT IS OF CRITICAL IMPORTANCE TO AVOID ANY ACTIVITY, MOTION OR PUSH OF THE RIGHT LOWER LEG OR EVEN ANY CONTRACTIONS OR STRETCHING OF THE LEG, AS THE ATTACHMENT OF THE TENDON INTO THE CALCANEOUS TUBEROSITY IS RELATIVELY FRAGILE AND IS VERY EASY FOR THE PATIENT TO PULL IT OFF.          KELLI JUAREZ MD             D: 2020   T: 2020   MT: latonya      Name:     SUSAN CHENG   MRN:      [id number]        Account:        [id number]   :      1960           Procedure Date: 2020      Document: [id number]

## 2020-11-15 ENCOUNTER — HOSPITAL ENCOUNTER (EMERGENCY)
Facility: CLINIC | Age: 60
Discharge: HOME | End: 2020-11-15
Attending: INTERNAL MEDICINE | Admitting: INTERNAL MEDICINE
Payer: MEDICARE

## 2020-11-15 VITALS
HEART RATE: 78 BPM | DIASTOLIC BLOOD PRESSURE: 75 MMHG | WEIGHT: 215 LBS | OXYGEN SATURATION: 98 % | RESPIRATION RATE: 18 BRPM | SYSTOLIC BLOOD PRESSURE: 144 MMHG | BODY MASS INDEX: 29.99 KG/M2 | TEMPERATURE: 98.3 F

## 2020-11-15 DIAGNOSIS — S81.011A KNEE LACERATION, RIGHT, INITIAL ENCOUNTER: ICD-10-CM

## 2020-11-15 PROCEDURE — 99283 EMERGENCY DEPT VISIT LOW MDM: CPT | Performed by: INTERNAL MEDICINE

## 2020-11-15 PROCEDURE — 12002 RPR S/N/AX/GEN/TRNK2.6-7.5CM: CPT | Performed by: INTERNAL MEDICINE

## 2020-11-15 PROCEDURE — 99283 EMERGENCY DEPT VISIT LOW MDM: CPT | Mod: 25 | Performed by: INTERNAL MEDICINE

## 2020-11-15 RX ORDER — LIDOCAINE HYDROCHLORIDE AND EPINEPHRINE 10; 10 MG/ML; UG/ML
30 INJECTION, SOLUTION INFILTRATION; PERINEURAL ONCE
Status: DISCONTINUED | OUTPATIENT
Start: 2020-11-15 | End: 2020-11-15 | Stop reason: HOSPADM

## 2020-11-15 ASSESSMENT — ENCOUNTER SYMPTOMS
SHORTNESS OF BREATH: 0
COLOR CHANGE: 0
FEVER: 0
EYE REDNESS: 0
HEADACHES: 0
NECK STIFFNESS: 0
DIFFICULTY URINATING: 0
ABDOMINAL PAIN: 0
ARTHRALGIAS: 0
CONFUSION: 0

## 2020-11-15 NOTE — ED NOTES
Bed: ED23  Expected date: 11/15/20  Expected time: 10:29 AM  Means of arrival: Ambulance  Comments:  [facility]    59 y/o male    Leg Injury

## 2020-11-15 NOTE — ED TRIAGE NOTES
PER SPF patient transferring with knee roll about and fell. Patinet reports laceration to right knee. Last tetanus shot unknown Denies hitting head or LOC.

## 2020-11-15 NOTE — DISCHARGE INSTRUCTIONS
Extremity Laceration: Stitches, Staples, or Tape  A laceration is a cut through the skin. If it is deep, it may require stitches or staples to close so it can heal. Minor cuts may be treated with surgical tape closures, or skin glue.  X-rays may be done if something may have entered the skin through the cut. You may also need a tetanus shot if you are not up to date on this vaccine.  Home care    Follow the healthcare provider s instructions on how to care for the cut.    Wash your hands with soap and warm water before and after caring for your wound. This is to help prevent infection.    Keep the wound clean and dry. If a bandage was applied and it becomes wet or dirty, replace it. Otherwise, leave it in place for the first 24 hours, then change it once a day or as directed.    If stitches or staples were used, clean the wound daily:  ? After removing the bandage, wash the area with soap and water. Use a wet cotton swab to loosen and remove any blood or crust that forms.  ? After cleaning, keep the wound clean and dry. Talk with your healthcare provider before putting any antibiotic ointment on the wound. Reapply the bandage.    You may remove the bandage to shower as usual after the first 24 hours, but don't soak the area in water (no swimming) until the stitches or staples are removed.    If surgical tape closures were used, keep the area clean and dry. If it becomes wet, blot it dry with a towel. Let the surgical tape fall off on its own.    The healthcare provider may prescribe an antibiotic cream or ointment to prevent infection. He or she may also prescribe an antibiotic pill. Don't stop taking this medicine until you have finished it all or the provider tells you to stop.    The provider may also prescribe medicine for pain. Follow the instructions for taking these medicines.    Don't do activities that may reopen your wound.  Follow-up care  Follow up with your healthcare provider, or as advised. Most  skin wounds heal within 10 days. But an infection may sometimes occur even with proper treatment. Check the wound daily for the signs of infection listed below. Stitches and staples should be removed within 7 to14 days. If surgical tape closures were used, you may remove them after 10 days if they have not fallen off by then.   When to seek medical advice  Call your healthcare provider right away if any of these occur:    Wound bleeding not controlled by direct pressure    Signs of infection, including increasing pain in the wound, increasing wound redness or swelling, or pus or bad odor coming from the wound    Fever of 100.4 F (38 C) or higher, or as directed by your healthcare provider    Stitches or staples come apart or fall out or surgical tape falls off before 7 days    Wound edges reopen    Wound changes colors    Numbness occurs around the wound     Decreased movement around the injured area  Jyothi last reviewed this educational content on 7/1/2017 2000-2020 [employer]. [address] [place] [address]. All rights reserved. This information is not intended as a substitute for professional medical care. Always follow your healthcare professional's instructions.      Please make an appointment to follow up with Your Primary Care Provider for suture removal in 10-20 days even if entirely better.

## 2020-11-15 NOTE — ED AVS SNAPSHOT
[facility] Emergency Department  [address] [address]  Phone: [phone]                                    Erwin Aguilar   MRN: [id number]    Department: [facility] Emergency Department   Date of Visit: 11/15/2020           After Visit Summary Signature Page    I have received my discharge instructions, and my questions have been answered. I have discussed any challenges I see with this plan with the nurse or doctor.    ..........................................................................................................................................  Patient/Patient Representative Signature      ..........................................................................................................................................  Patient Representative Print Name and Relationship to Patient    ..................................................               ................................................  Date                                   Time    ..........................................................................................................................................  Reviewed by Signature/Title    ...................................................              ..............................................  Date                                               Time          22EPIC Rev 08/18

## 2020-11-16 ENCOUNTER — TELEPHONE (OUTPATIENT)
Dept: ORTHOPEDICS | Facility: CLINIC | Age: 60
End: 2020-11-16

## 2020-11-16 NOTE — TELEPHONE ENCOUNTER
I called the patient this morning as he sent a MyChart requesting a phone call. I called the patient and he restated that he fell off his scooter and had to get stiches on his knee. He related that the foot never hit the ground and there is no increase in pain in that area. He further tells me that he will not be able to use the scooter for a while as he now has stiches in the knee so he does not need a new cast until he comes in to clinic for his 2 week POP appointment.     Pao Ramires, ATC

## 2020-11-23 ENCOUNTER — HOSPITAL ENCOUNTER (EMERGENCY)
Facility: CLINIC | Age: 60
Discharge: HOME | End: 2020-11-23
Attending: EMERGENCY MEDICINE | Admitting: EMERGENCY MEDICINE
Payer: MEDICARE

## 2020-11-23 ENCOUNTER — NURSE TRIAGE (OUTPATIENT)
Dept: NURSING | Facility: CLINIC | Age: 60
End: 2020-11-23

## 2020-11-23 VITALS
DIASTOLIC BLOOD PRESSURE: 82 MMHG | BODY MASS INDEX: 28.17 KG/M2 | RESPIRATION RATE: 16 BRPM | WEIGHT: 208 LBS | SYSTOLIC BLOOD PRESSURE: 191 MMHG | OXYGEN SATURATION: 98 % | HEART RATE: 67 BPM | HEIGHT: 72 IN | TEMPERATURE: 97.9 F

## 2020-11-23 DIAGNOSIS — R11.0 NAUSEA: ICD-10-CM

## 2020-11-23 DIAGNOSIS — Z51.89 VISIT FOR WOUND CHECK: ICD-10-CM

## 2020-11-23 LAB
ALBUMIN SERPL-MCNC: 3.8 G/DL (ref 3.4–5)
ALP SERPL-CCNC: 109 U/L (ref 40–150)
ALT SERPL W P-5'-P-CCNC: 24 U/L (ref 0–70)
ANION GAP SERPL CALCULATED.3IONS-SCNC: 7 MMOL/L (ref 3–14)
AST SERPL W P-5'-P-CCNC: 14 U/L (ref 0–45)
BASOPHILS # BLD AUTO: 0.1 10E9/L (ref 0–0.2)
BASOPHILS NFR BLD AUTO: 0.9 %
BILIRUB SERPL-MCNC: 0.8 MG/DL (ref 0.2–1.3)
BUN SERPL-MCNC: 9 MG/DL (ref 7–30)
CALCIUM SERPL-MCNC: 9.7 MG/DL (ref 8.5–10.1)
CHLORIDE SERPL-SCNC: 109 MMOL/L (ref 94–109)
CO2 SERPL-SCNC: 25 MMOL/L (ref 20–32)
CREAT SERPL-MCNC: 0.65 MG/DL (ref 0.66–1.25)
DIFFERENTIAL METHOD BLD: ABNORMAL
EOSINOPHIL # BLD AUTO: 0.3 10E9/L (ref 0–0.7)
EOSINOPHIL NFR BLD AUTO: 2.6 %
ERYTHROCYTE [DISTWIDTH] IN BLOOD BY AUTOMATED COUNT: 12.1 % (ref 10–15)
GFR SERPL CREATININE-BSD FRML MDRD: >90 ML/MIN/{1.73_M2}
GLUCOSE SERPL-MCNC: 119 MG/DL (ref 70–99)
HCT VFR BLD AUTO: 42.2 % (ref 40–53)
HGB BLD-MCNC: 14.3 G/DL (ref 13.3–17.7)
LIPASE SERPL-CCNC: 134 U/L (ref 73–393)
LYMPHOCYTES # BLD AUTO: 2.2 10E9/L (ref 0.8–5.3)
LYMPHOCYTES NFR BLD AUTO: 21.1 %
MCH RBC QN AUTO: 29.8 PG (ref 26.5–33)
MCHC RBC AUTO-ENTMCNC: 33.9 G/DL (ref 31.5–36.5)
MCV RBC AUTO: 88 FL (ref 78–100)
MONOCYTES # BLD AUTO: 0.5 10E9/L (ref 0–1.3)
MONOCYTES NFR BLD AUTO: 4.4 %
MYELOCYTES # BLD: 0.1 10E9/L
MYELOCYTES NFR BLD MANUAL: 0.9 %
NEUTROPHILS # BLD AUTO: 7.4 10E9/L (ref 1.6–8.3)
NEUTROPHILS NFR BLD AUTO: 70.1 %
PLATELET # BLD AUTO: 295 10E9/L (ref 150–450)
PLATELET # BLD EST: ABNORMAL 10*3/UL
POTASSIUM SERPL-SCNC: 3.6 MMOL/L (ref 3.4–5.3)
PROT SERPL-MCNC: 8.5 G/DL (ref 6.8–8.8)
RBC # BLD AUTO: 4.8 10E12/L (ref 4.4–5.9)
SODIUM SERPL-SCNC: 141 MMOL/L (ref 133–144)
VARIANT LYMPHS BLD QL SMEAR: PRESENT
WBC # BLD AUTO: 10.6 10E9/L (ref 4–11)

## 2020-11-23 PROCEDURE — 250N000011 HC RX IP 250 OP 636: Performed by: EMERGENCY MEDICINE

## 2020-11-23 PROCEDURE — 96361 HYDRATE IV INFUSION ADD-ON: CPT

## 2020-11-23 PROCEDURE — 96374 THER/PROPH/DIAG INJ IV PUSH: CPT

## 2020-11-23 PROCEDURE — 80053 COMPREHEN METABOLIC PANEL: CPT | Performed by: EMERGENCY MEDICINE

## 2020-11-23 PROCEDURE — 258N000003 HC RX IP 258 OP 636: Performed by: EMERGENCY MEDICINE

## 2020-11-23 PROCEDURE — 93005 ELECTROCARDIOGRAM TRACING: CPT

## 2020-11-23 PROCEDURE — 85025 COMPLETE CBC W/AUTO DIFF WBC: CPT | Performed by: EMERGENCY MEDICINE

## 2020-11-23 PROCEDURE — 83690 ASSAY OF LIPASE: CPT | Performed by: EMERGENCY MEDICINE

## 2020-11-23 PROCEDURE — 99284 EMERGENCY DEPT VISIT MOD MDM: CPT | Mod: 25

## 2020-11-23 RX ORDER — ONDANSETRON 2 MG/ML
4 INJECTION INTRAMUSCULAR; INTRAVENOUS ONCE
Status: COMPLETED | OUTPATIENT
Start: 2020-11-23 | End: 2020-11-23

## 2020-11-23 RX ORDER — SODIUM CHLORIDE 9 MG/ML
INJECTION, SOLUTION INTRAVENOUS CONTINUOUS
Status: DISCONTINUED | OUTPATIENT
Start: 2020-11-23 | End: 2020-11-23 | Stop reason: HOSPADM

## 2020-11-23 RX ADMIN — ONDANSETRON 4 MG: 2 INJECTION INTRAMUSCULAR; INTRAVENOUS at 17:59

## 2020-11-23 RX ADMIN — SODIUM CHLORIDE 1000 ML: 900 INJECTION, SOLUTION INTRAVENOUS at 17:14

## 2020-11-23 ASSESSMENT — MIFFLIN-ST. JEOR: SCORE: 1786.48

## 2020-11-23 NOTE — CONSULTS
[facility], Orthopaedic Surgery Consultation    Erwin Aguilar MRN# [id number]   Age: 60 year old YOB: 1960     Date of Admission:  11/23/2020    Reason for consult: Right short leg cast soiled        Requesting physician: Dr. Park         Assessment and Plan:   Assessment:  59 yo male s/p right achilles debridement, partial calcaneous excision and achilles reattachment on 11/11/2020. Wound dressing saturated with blood, blood leaking through cast.     Plan:  - remove cast, change dressing  - placed in anterior tibial slab to prevent right foot dorsiflexion  - NWB RLE  - No ROM of RLE  - patient will follow up in clinic tomorrow at 9 am for suture removal and cast placement.           History of Present Illness:   This is a 60 year old year old male with complex past medical history. He presents with a soiled cast after right achilles repair with Dr. Sandoval on 11/11/2020. He noted bleeding at the heel of his cast. He also noted a foul smell from his cast. He states that he has been nauseous since his surgery and unable to tolerate meals. He notes several episodes of vomiting. He has a history of frequent episodes of nausea. At baseline he has no sensation of his right foot from previous back injury.     No trauma. No LOC.    Patient was seen and examined by me. History, PMH, Meds, SH, complete ROS (10 organ systems) and PE reviewed with patient and prior medical records.              Past Medical History:     Past Medical History:   Diagnosis Date     Actinic keratosis     aldara     Anxiety      Cancer (H)     squamous cell skin CA     Cauda equina spinal cord injury (H)      Chronic sinusitis 5-1-16     Diastasis recti      Esophageal reflux      Esophageal varices in cirrhosis (H) 4/1/2014    Hospitalized for UGI blee 3/28/14, endoscopy revealed bleeding varices.     Essential hypertension, benign      Intermittent asthma      Mild depression (H)      Mixed hyperlipidemia      Nasal  polyps 5-1-16     Other chronic pain      SCCA (squamous cell carcinoma) of skin      Seasonal allergic conjunctivitis      Type II or unspecified type diabetes mellitus without mention of complication, not stated as uncontrolled      Unspecified site of spinal cord injury without evidence of spinal bone injury     due to back surgery             Past Surgical History:     Past Surgical History:   Procedure Laterality Date     BACK SURGERY  August 2009     C APPENDECTOMY  1974     COLONOSCOPY N/A 5/12/2016    Procedure: COMBINED COLONOSCOPY, SINGLE OR MULTIPLE BIOPSY/POLYPECTOMY BY BIOPSY;  Surgeon: Ana Paula Urbina MD;  Location:  GI     ENDOSCOPY UPPER, COLONOSCOPY, COMBINED  10/19/2011    Procedure:COMBINED ENDOSCOPY UPPER, COLONOSCOPY; Upper Endoscopy, Colonoscopy with Polypectomy x4; Surgeon:AMBAR RODRÍGUEZIQ; Location: OR     ENT SURGERY  1-2016    Ongoing since then     ESOPHAGOSCOPY, GASTROSCOPY, DUODENOSCOPY (EGD), COMBINED  3/28/2014    Procedure: COMBINED ESOPHAGOSCOPY, GASTROSCOPY, DUODENOSCOPY (EGD);  EGD, Gastric Biopsies, Esophageal Banding;  Surgeon: Reyna Tovar MD;  Location:  OR     ESOPHAGOSCOPY, GASTROSCOPY, DUODENOSCOPY (EGD), COMBINED  6/9/2014    Procedure: COMBINED ESOPHAGOSCOPY, GASTROSCOPY, DUODENOSCOPY (EGD);  Surgeon: Curtis Mendez MD;  Location: [facility]     ESOPHAGOSCOPY, GASTROSCOPY, DUODENOSCOPY (EGD), COMBINED  7/24/2014    Procedure: COMBINED ESOPHAGOSCOPY, GASTROSCOPY, DUODENOSCOPY (EGD);  Surgeon: Gerard Samaniego MD;  Location:  OR     ESOPHAGOSCOPY, GASTROSCOPY, DUODENOSCOPY (EGD), COMBINED N/A 10/31/2014    Procedure: COMBINED ESOPHAGOSCOPY, GASTROSCOPY, DUODENOSCOPY (EGD);  Surgeon: Gerard Samaniego MD;  Location:  OR     ESOPHAGOSCOPY, GASTROSCOPY, DUODENOSCOPY (EGD), COMBINED N/A 5/12/2016    Procedure: COMBINED ESOPHAGOSCOPY, GASTROSCOPY, DUODENOSCOPY (EGD);  Surgeon: Ana Paula Urbina MD;  Location: [facility]      ESOPHAGOSCOPY, GASTROSCOPY, DUODENOSCOPY (EGD), COMBINED N/A 8/2/2018    Procedure: COMBINED ESOPHAGOSCOPY, GASTROSCOPY, DUODENOSCOPY (EGD);  EGD;  Surgeon: Yu Wagner MD;  Location: [facility]     HCL SQUAMOUS CELL CARCINOMA AG  10/07    left forearm     HERNIORRHAPHY UMBILICAL  11/8/2012    Procedure: HERNIORRHAPHY UMBILICAL;  Open Umbilical Hernia Repair With Mesh ;  Surgeon: Chase Nicholson MD;  Location: [facility] OR     INSERT STIMULATOR DORSAL COLUMN N/A 6/28/2018    Procedure: INSERT STIMULATOR DORSAL COLUMN;;  Surgeon: Elvis Sauceda MD;  Location: [facility] OR     neuro stimulator  2010     REMOVE GENERATOR STIMULATOR (LOCATION) N/A 6/28/2018    Procedure: REMOVE GENERATOR STIMULATOR (LOCATION);  Spinal Cord Stimulator and IPG Explant and Re-Implant of SCS System (Leads and IPG);  Surgeon: Elvis Sauceda MD;  Location: [facility] OR     REPAIR TENDON ACHILLES Right 11/11/2020    Procedure: Right achilles debridement and partial calcaneus excision;  Surgeon: Eh Sandoval MD;  Location: [facility] OR     SURGICAL HISTORY OF -   1/02    abcess tooth     SURGICAL HISTORY OF -   1999    L4-5 laminectomy, cauda equina syndrome     SURGICAL HISTORY OF -   +    herniated disk repair     TONSILLECTOMY  10 1964     TRANSPOSITION ULNAR NERVE (ELBOW)      right             Social History:     Social History     Socioeconomic History     Marital status: Single     Spouse name: None     Number of children: 0     Years of education: None     Highest education level: None   Occupational History     Occupation: sales     Employer: UNEMPLOYED   Social Needs     Financial resource strain: None     Food insecurity     Worry: None     Inability: None     Transportation needs     Medical: None     Non-medical: None   Tobacco Use     Smoking status: Former Smoker     Packs/day: 0.50     Years: 1.00     Pack years: 0.50     Types: Cigarettes     Start date: 10/13/1983     Quit date:  1984     Years since quittin.4     Smokeless tobacco: Never Used   Substance and Sexual Activity     Alcohol use: No     Alcohol/week: 0.0 standard drinks     Comment: a pint of alcohol / day - last drink was 3/28/14     Drug use: Yes     Frequency: 2.0 times per week     Types: Marijuana     Comment: marijuana - occasional     Sexual activity: Yes     Partners: Female, Male   Lifestyle     Physical activity     Days per week: None     Minutes per session: None     Stress: None   Relationships     Social connections     Talks on phone: None     Gets together: None     Attends Restoration service: None     Active member of club or organization: None     Attends meetings of clubs or organizations: None     Relationship status: None     Intimate partner violence     Fear of current or ex partner: None     Emotionally abused: None     Physically abused: None     Forced sexual activity: None   Other Topics Concern     Parent/sibling w/ CABG, MI or angioplasty before 65F 55M? No   Social History Narrative     None             Family History:     Family History   Problem Relation Age of Onset     Cancer Mother         lung     Breast Cancer Mother      Other Cancer Mother      Cancer Father         esophogeal, GERD     Diabetes Brother         amputation, Type 1     Diabetes Brother      Diabetes Brother      Cancer - colorectal No family hx of               Medications:     Current Facility-Administered Medications   Medication     0.9% sodium chloride BOLUS    Followed by     sodium chloride 0.9% infusion     Current Outpatient Medications   Medication Sig     ACE/ARB NOT PRESCRIBED, INTENTIONAL, ACE & ARB not prescribed due to Allergy     albuterol (PROAIR HFA/PROVENTIL HFA/VENTOLIN HFA) 108 (90 Base) MCG/ACT inhaler INHALE 2 PUFFS BY MOUTH EVERY 6 HOURS AS NEEDED FOR SHORTNESS OF BREATH/DYSPNEA OR WHEEZING     ammonium lactate (LAC-HYDRIN) 12 % cream Apply topically 2 times daily as needed for dry skin      baclofen (LIORESAL) 10 MG tablet TAKE 2 TABLETS BY MOUTH THREE TIMES DAILY     blood glucose (ACCU-CHEK KARISHMA PLUS) test strip USE TO TEST BLOOD SUGARS ONCE DAILY     hydrochlorothiazide (HYDRODIURIL) 25 MG tablet TAKE 1 TABLET (25 MG) BY MOUTH DAILY     insulin glargine (BASAGLAR KWIKPEN) 100 UNIT/ML pen Inject 26 Units Subcutaneous daily     insulin pen needle (BD ROSE U/F) 32G X 4 MM miscellaneous Use daily and as directed.     oxyCODONE (ROXICODONE) 5 MG tablet Take 1-2 tablets (5-10 mg) by mouth every 4 hours as needed for moderate to severe pain     potassium chloride ER (K-TAB) 20 MEQ CR tablet TAKE 1 TABLET (20 MEQ) BY MOUTH DAILY     promethazine (PHENERGAN) 25 MG tablet TAKE 1 TABLET (25 MG) BY MOUTH 3 TIMES DAILY AS NEEDED FOR NAUSEA     propranolol (INDERAL) 40 MG tablet TAKE 2 TABLETS (80 MG) BY MOUTH 2 TIMES DAILY     rosuvastatin (CRESTOR) 10 MG tablet TAKE 1 TABLET BY MOUTH EVERY DAY     rosuvastatin (CRESTOR) 20 MG tablet TAKE 1 TABLET BY MOUTH EVERY DAY     sertraline (ZOLOFT) 100 MG tablet TAKE 2 TABLETS BY MOUTH EVERY DAY             Allergies:      Allergies   Allergen Reactions     Levaquin Anaphylaxis and Rash     Swelling in lip and tongue felt thick     Lisinopril Anaphylaxis     Swollen tongue; inability to swallow; drooling; hives; swollen face, neck, angioedema     Acetaminophen      Hx of cirrhosis      Amlodipine      Swelling, hives, possible angioedema       Morphine      b/p dropped and arms went numb  Hypotension     Quinolones Hives     Bactrim [Sulfamethoxazole W/Trimethoprim] Rash            Review of Systems:   A comprehensive 10 point review of systems (constitutional, ENT, cardiac, peripheral vascular, respiratory, GI, , Musculoskeletal, skin, Neurological) was performed and found to be negative except as described in this note.     CONSTITUTIONAL:  negative for  fevers, chills, sweats, fatigue, malaise and weight loss  HEENT:  negative for  tinnitus, earaches, nasal  congestion, epistaxis, snoring, sore throat and voice change  RESPIRATORY:  negative for  dyspnea, wheezing, hemoptysis, chest pain and cough  CARDIOVASCULAR:  negative for  chest pain, palpitations, orthopnea, edema, syncope  GASTROINTESTINAL:  + nausea, + vomiting negative for diarrhea, constipation, abdominal pain, dysphagia, reflux, hematemesis and hemtochezia  GENITOURINARY:  negative for frequency, dysuria, nocturia, urinary incontinence and hematuria  INTEGUMENT/BREAST:  negative for rash and skin lesion(s)  HEMATOLOGIC/LYMPHATIC:  negative for easy bruising, bleeding, lymphadenopathy and swelling/edema  ALLERGIC/IMMUNOLOGIC:  negative for recurrent infections and drug reactions  ENDOCRINE:  negative for heat intolerance, cold intolerance and diabetic symptoms including polyuria and polydipsia  MUSCULOSKELETAL:  negative for  myalgias, arthralgias, pain, joint swelling and muscle weakness  NEUROLOGICAL:  negative for headaches, dizziness, seizures, gait problems, tremor, weakness, numbness and tingling            Physical Exam:   COMPLETE EXAMINATION:   VITAL SIGNS: BP (!) 179/93   Pulse 66   Temp 98.8  F (37.1  C) (Oral)   Resp 16   Ht 1.829 m (6')   Wt 94.3 kg (208 lb)   SpO2 100%   BMI 28.21 kg/m    RESP: Non labored breathing  ABD: Benign  SKIN: Grossly normal   LYMPHATIC:  Grossly normal  NEURO:  Grossly normal   VASCULAR: Satisfactory perfusion of all extremities  MUSCULOSKELETAL:     Focused exam of RLE:  Short leg cast in place, scant bloody drainage over the heel.  Cast taken down revealing dressing saturated with blood.  Incision macerated, sutures in place. No active purulent discharge.   No sensation of right lower extremity (at patient's baseline)  Unable to wiggle toes (at patient's baseline)  Did not test ankle ROM secondary to recent surgery  2+ DP and PT pulses  Toes warm and well perfused, <2s cap refill            Data:   All pertinent laboratory data reviewed  All imaging studies  reviewed by me.    Recent Labs   Lab Test 11/23/20  1710 11/09/20  1426 07/20/20  0810 08/10/15  0911 08/10/15  0911 11/29/14  0627 11/29/14  0627 10/06/14  0852 10/06/14  0852 09/28/14  0422 09/28/14  0422 05/01/14  0801 05/01/14  0801   HGB 14.3 13.5 13.4   < > 13.2*   < > 12.4*   < > 13.3   < > 12.1*   < > 8.2*   SED  --   --   --   --  34*  --   --   --   --   --  42*  --  87*   CRP  --   --   --   --  6.4  --  21.9*  --  9.9*   < > 16.4*   < >  --    WBC 10.6 7.2 7.0   < > 8.4   < > 8.8   < > 7.3   < > 10.8   < > 5.3    < > = values in this interval not displayed.     No results for input(s): FTYP, FNEU, FOTH, FCOL, FAPR, FWBC in the last 36998 hours.    Xray no new imaging       Signed:    This consultation has been discussed with Dr. Sandoval, Attending Physician.    Ernesto Webber MD 11/23/2020  Orthopedic Surgery, PGY1  Pager: [phone]

## 2020-11-23 NOTE — ED NOTES
Bed: ED25  Expected date: 11/23/20  Expected time: 4:07 PM  Means of arrival: Ambulance  Comments:  [facility]    59 y/o male    Post op problems with a catheter

## 2020-11-23 NOTE — ED TRIAGE NOTES
Bone spur removal from achilles tendon on 11/11/20. Here for drainage noted to posterior ankle cast site. Also, pt complains of nausea and inability to keep down solid food.

## 2020-11-23 NOTE — ED AVS SNAPSHOT
[facility] Emergency Department  [address] [address]  Phone: [phone]                                    Erwin Aguilar   MRN: [id number]    Department: [facility] Emergency Department   Date of Visit: 11/23/2020           After Visit Summary Signature Page    I have received my discharge instructions, and my questions have been answered. I have discussed any challenges I see with this plan with the nurse or doctor.    ..........................................................................................................................................  Patient/Patient Representative Signature      ..........................................................................................................................................  Patient Representative Print Name and Relationship to Patient    ..................................................               ................................................  Date                                   Time    ..........................................................................................................................................  Reviewed by Signature/Title    ...................................................              ..............................................  Date                                               Time          22EPIC Rev 08/18

## 2020-11-23 NOTE — TELEPHONE ENCOUNTER
[facility]: Nurse Triage Note  SITUATION/BACKGROUND                                                      Erwin Aguilar is a 60 year old male who calls because he is bleeding through his cast  Allergies:   Allergies   Allergen Reactions     Levaquin Anaphylaxis and Rash     Swelling in lip and tongue felt thick     Lisinopril Anaphylaxis     Swollen tongue; inability to swallow; drooling; hives; swollen face, neck, angioedema     Acetaminophen      Hx of cirrhosis      Amlodipine      Swelling, hives, possible angioedema       Morphine      b/p dropped and arms went numb  Hypotension     Quinolones Hives     Bactrim [Sulfamethoxazole W/Trimethoprim] Rash       ASSESSMENT      60 year old male s/p Right Achilles tendon debridement,Right calcaneus partial excision and Right Achilles tendon reattachment.  He calls today because he started bleeding through his cast this morning   He states his cast has had a terrible odor and he is able to put his hand inside the cast now and it is coming from inside the cast   He states he has the chills -unable to check temperature but concerned he may have a fever.  He has been nauseated and has vomited 8 out of the last 14 days. Unable to eat  Getting down protein shakes. He states the nausea may be from his oxycodone which he has backed off on.   He took 1/2 tab this morning.  He has no pain in his foot or ankle but continues to have nerve pain which is chronic.      Recommended he go to the ED.  He states he would like me to call Dr. Sandoval's team to double check as he is concerned with COVID and also is not up to sitting around for hours      Spoke to Ortho and they will review and get back to Erwin        RECOMMENDATION/PLAN                                                      RECOMMENDED DISPOSITION:  See above  Will comply with recommendation: Yes    If further questions/concerns or if symptoms do not improve, worsen or new symptoms develop, call your PCP or  [phone] to talk with the Resident on call, as soon as possible.    Guideline used: post op problem  Telephone Triage Protocols for Nurses, Fifth Edition, Yu Ontiveros, RN, RN

## 2020-11-23 NOTE — TELEPHONE ENCOUNTER
"I called the patient back to discuss I told him that he should go to the [facility] to get all of his concerns addressed including his cast. He should go to the [facility] so that our ortho staff can take care of him. He said that he has already called 911 to come and get him because he has no one to take  Him there. I asked him if he knew what hospital they would take him to. He said they were going to the [facility]. I told him that it would be better if he went to [place]. He said no he always goes to this hospital. I explained to him to be sure that they address his cast and if the remove it they should place a new cast. He said he will not leave there until this is done. I explained to him that unfortunately this has happened quite a few times where patients have their casts removed to check for infection but they do not place a cast back in place. I told him that I would leave his appointment scheduled with us tomorrow in place so that we can see and address. He said no if he is going to the hospital now he does not want to come in tomorrow. I told him that it is very important that the cast is place and placed properly. He is s/p achilles tendon repair which means his foot can not be moved into a neutral alignment yet and can only be moved so far at a time to ensure that no damage happens to the achilles. He relayed to me that if he goes to the [facility] he will not come to his appointment tomorrow that it is one or the other. I told him that he needed to go to the [facility] to have the addressed but he does need to come in and see us. He again told me that he either \"wait to come [facility]\" he then told me that he has no time to argue and the EMS had arrived and he hung up.      [place] or I will follow up with Erwin tomorrow or later in the week.     Pao Ramires, ATC    "

## 2020-11-24 ENCOUNTER — MYC MEDICAL ADVICE (OUTPATIENT)
Dept: FAMILY MEDICINE | Facility: CLINIC | Age: 60
End: 2020-11-24

## 2020-11-24 DIAGNOSIS — Z71.89 OTHER SPECIFIED COUNSELING: ICD-10-CM

## 2020-11-24 LAB — INTERPRETATION ECG - MUSE: NORMAL

## 2020-11-24 NOTE — ED PROVIDER NOTES
ED Provider Note  [facility]      History     Chief Complaint   Patient presents with     Post-op Problem     Bone spur removal from achilles tendon on 11/11/20. Here for drainage noted to posterior ankle cast site. Also, pt complains of nausea and inability to keep down solid food.     HPI  Erwin Aguilar is a 60 year old male with a history of s/p right tendon achilles repair (11/11/2020), type 2 diabetes mellitus, cauda equina syndrome with neurogenic bladder, cirrhosis c/b esophageal varices, HTN, and HLD who presents to the Emergency Department via EMS with drainage to posterior ankle cast site.  Patient states that last night he had bloody drainage on the heel through his cast.  He also reports that its foul-smelling as well but has not noticed any purulent discharge.  He also reports decreased p.o. intake and nausea and vomiting.  He has Zofran at home as he does have chronic nausea and vomiting issues.  He states that he has not eaten much since his surgery several weeks prior.  He came in for evaluation of this and along with a check for his lower extremity drainage as he is in a circumferential cast.    Past Medical History  Past Medical History:   Diagnosis Date     Actinic keratosis     aldara     Anxiety      Cancer (H)     squamous cell skin CA     Cauda equina spinal cord injury (H)      Chronic sinusitis 5-1-16     Diastasis recti      Esophageal reflux      Esophageal varices in cirrhosis (H) 4/1/2014    Hospitalized for UGI blee 3/28/14, endoscopy revealed bleeding varices.     Essential hypertension, benign      Intermittent asthma      Mild depression (H)      Mixed hyperlipidemia      Nasal polyps 5-1-16     Other chronic pain      SCCA (squamous cell carcinoma) of skin      Seasonal allergic conjunctivitis      Type II or unspecified type diabetes mellitus without mention of complication, not stated as uncontrolled      Unspecified site of spinal cord injury  without evidence of spinal bone injury     due to back surgery     Past Surgical History:   Procedure Laterality Date     BACK SURGERY  August 2009     C APPENDECTOMY  1974     COLONOSCOPY N/A 5/12/2016    Procedure: COMBINED COLONOSCOPY, SINGLE OR MULTIPLE BIOPSY/POLYPECTOMY BY BIOPSY;  Surgeon: Ana Paula Urbina MD;  Location:  GI     ENDOSCOPY UPPER, COLONOSCOPY, COMBINED  10/19/2011    Procedure:COMBINED ENDOSCOPY UPPER, COLONOSCOPY; Upper Endoscopy, Colonoscopy with Polypectomy x4; Surgeon:AMBAR RODRÍGUEZIQ; Location: OR     ENT SURGERY  1-2016    Ongoing since then     ESOPHAGOSCOPY, GASTROSCOPY, DUODENOSCOPY (EGD), COMBINED  3/28/2014    Procedure: COMBINED ESOPHAGOSCOPY, GASTROSCOPY, DUODENOSCOPY (EGD);  EGD, Gastric Biopsies, Esophageal Banding;  Surgeon: Reyna Tovar MD;  Location:  OR     ESOPHAGOSCOPY, GASTROSCOPY, DUODENOSCOPY (EGD), COMBINED  6/9/2014    Procedure: COMBINED ESOPHAGOSCOPY, GASTROSCOPY, DUODENOSCOPY (EGD);  Surgeon: Curtis Mendez MD;  Location:  GI     ESOPHAGOSCOPY, GASTROSCOPY, DUODENOSCOPY (EGD), COMBINED  7/24/2014    Procedure: COMBINED ESOPHAGOSCOPY, GASTROSCOPY, DUODENOSCOPY (EGD);  Surgeon: Gerard Samaniego MD;  Location:  OR     ESOPHAGOSCOPY, GASTROSCOPY, DUODENOSCOPY (EGD), COMBINED N/A 10/31/2014    Procedure: COMBINED ESOPHAGOSCOPY, GASTROSCOPY, DUODENOSCOPY (EGD);  Surgeon: Gerard Samaniego MD;  Location:  OR     ESOPHAGOSCOPY, GASTROSCOPY, DUODENOSCOPY (EGD), COMBINED N/A 5/12/2016    Procedure: COMBINED ESOPHAGOSCOPY, GASTROSCOPY, DUODENOSCOPY (EGD);  Surgeon: Ana Paula Urbina MD;  Location:  GI     ESOPHAGOSCOPY, GASTROSCOPY, DUODENOSCOPY (EGD), COMBINED N/A 8/2/2018    Procedure: COMBINED ESOPHAGOSCOPY, GASTROSCOPY, DUODENOSCOPY (EGD);  EGD;  Surgeon: Yu Wagner MD;  Location:  GI     HCL SQUAMOUS CELL CARCINOMA AG  10/07    left forearm     HERNIORRHAPHY UMBILICAL  11/8/2012    Procedure:  HERNIORRHAPHY UMBILICAL;  Open Umbilical Hernia Repair With Mesh ;  Surgeon: Chase Nicholson MD;  Location: [facility] OR     INSERT STIMULATOR DORSAL COLUMN N/A 6/28/2018    Procedure: INSERT STIMULATOR DORSAL COLUMN;;  Surgeon: Elvis Sauceda MD;  Location: [facility] OR     neuro stimulator  2010     REMOVE GENERATOR STIMULATOR (LOCATION) N/A 6/28/2018    Procedure: REMOVE GENERATOR STIMULATOR (LOCATION);  Spinal Cord Stimulator and IPG Explant and Re-Implant of SCS System (Leads and IPG);  Surgeon: Elvis Sauceda MD;  Location: [facility] OR     REPAIR TENDON ACHILLES Right 11/11/2020    Procedure: Right achilles debridement and partial calcaneus excision;  Surgeon: Eh Sandoval MD;  Location: [facility] OR     SURGICAL HISTORY OF -   1/02    abcess tooth     SURGICAL HISTORY OF -   1999    L4-5 laminectomy, cauda equina syndrome     SURGICAL HISTORY OF -   +    herniated disk repair     TONSILLECTOMY  10 1964     TRANSPOSITION ULNAR NERVE (ELBOW)      right          ACE/ARB NOT PRESCRIBED, INTENTIONAL,       albuterol (PROAIR HFA/PROVENTIL HFA/VENTOLIN HFA) 108 (90 Base) MCG/ACT inhaler       ammonium lactate (LAC-HYDRIN) 12 % cream       baclofen (LIORESAL) 10 MG tablet       blood glucose (ACCU-CHEK KARISHMA PLUS) test strip       hydrochlorothiazide (HYDRODIURIL) 25 MG tablet       insulin glargine (BASAGLAR KWIKPEN) 100 UNIT/ML pen       insulin pen needle (BD ROSE U/F) 32G X 4 MM miscellaneous       oxyCODONE (ROXICODONE) 5 MG tablet       potassium chloride ER (K-TAB) 20 MEQ CR tablet       promethazine (PHENERGAN) 25 MG tablet       propranolol (INDERAL) 40 MG tablet       rosuvastatin (CRESTOR) 10 MG tablet       rosuvastatin (CRESTOR) 20 MG tablet       sertraline (ZOLOFT) 100 MG tablet      Allergies   Allergen Reactions     Levaquin Anaphylaxis and Rash     Swelling in lip and tongue felt thick     Lisinopril Anaphylaxis     Swollen tongue; inability to swallow; drooling;  hives; swollen face, neck, angioedema     Acetaminophen      Hx of cirrhosis      Amlodipine      Swelling, hives, possible angioedema       Morphine      b/p dropped and arms went numb  Hypotension     Quinolones Hives     Bactrim [Sulfamethoxazole W/Trimethoprim] Rash     Family History  Family History   Problem Relation Age of Onset     Cancer Mother         lung     Breast Cancer Mother      Other Cancer Mother      Cancer Father         esophogeal, GERD     Diabetes Brother         amputation, Type 1     Diabetes Brother      Diabetes Brother      Cancer - colorectal No family hx of      Social History   Social History     Tobacco Use     Smoking status: Former Smoker     Packs/day: 0.50     Years: 1.00     Pack years: 0.50     Types: Cigarettes     Start date: 10/13/1983     Quit date: 1984     Years since quittin.4     Smokeless tobacco: Never Used   Substance Use Topics     Alcohol use: No     Alcohol/week: 0.0 standard drinks     Comment: a pint of alcohol / day - last drink was 3/28/14     Drug use: Yes     Frequency: 2.0 times per week     Types: Marijuana     Comment: marijuana - occasional      Past medical history, past surgical history, medications, allergies, family history, and social history were reviewed with the patient. No additional pertinent items.       Review of Systems  A complete review of systems was performed with pertinent positives and negatives noted in the HPI, and all other systems negative.    Physical Exam   BP: (!) 179/93  Pulse: 66  Temp: 98.8  F (37.1  C)  Resp: 16  Height: 182.9 cm (6')  Weight: 94.3 kg (208 lb)  SpO2: 100 %  Physical Exam  Constitutional:       General: He is not in acute distress.     Appearance: Normal appearance.   HENT:      Head: Normocephalic and atraumatic.      Mouth/Throat:      Mouth: Mucous membranes are moist.   Eyes:      Extraocular Movements: Extraocular movements intact.   Neck:      Musculoskeletal: Neck supple.   Cardiovascular:       Rate and Rhythm: Normal rate and regular rhythm.      Pulses: Normal pulses.   Pulmonary:      Effort: Pulmonary effort is normal.      Breath sounds: Normal breath sounds.   Abdominal:      General: Abdomen is flat.      Palpations: Abdomen is soft.      Tenderness: There is no abdominal tenderness.   Musculoskeletal:      Comments: Patient's right lower extremity has a circumferential calf to below the knee.  Capillary refill intact to the toes.  He does not have sensation but this is old and chronic due to his previous neurologic disease and injury.  There is no redness of the toes along with no redness above the cast or any signs for lymphangitic streaking.  He has an old wound and sutures in place in the knee from a previous fall.   Neurological:      General: No focal deficit present.      Mental Status: He is alert and oriented to person, place, and time.       ED Course      Procedures          EKG Interpretation:      Interpreted by Myron Park MD  Time reviewed: 1752  Symptoms at time of EKG: None   Rhythm: Normal sinus  and Sinus bradycardia  Rate: 59  Axis: Normal  Ectopy: None  Conduction: Normal  ST Segments/ T Waves: No acute ischemic changes and Non-specific ST-T wave changes  Q Waves: None  Comparison to prior: No old EKG available    Clinical Impression: non-specific EKG,              Results for orders placed or performed during the hospital encounter of 11/23/20   CBC with platelets differential     Status: Abnormal   Result Value Ref Range    WBC 10.6 4.0 - 11.0 10e9/L    RBC Count 4.80 4.4 - 5.9 10e12/L    Hemoglobin 14.3 13.3 - 17.7 g/dL    Hematocrit 42.2 40.0 - 53.0 %    MCV 88 78 - 100 fl    MCH 29.8 26.5 - 33.0 pg    MCHC 33.9 31.5 - 36.5 g/dL    RDW 12.1 10.0 - 15.0 %    Platelet Count 295 150 - 450 10e9/L    Diff Method Manual Differential     % Neutrophils 70.1 %    % Lymphocytes 21.1 %    % Monocytes 4.4 %    % Eosinophils 2.6 %    % Basophils 0.9 %    % Myelocytes 0.9 %     Absolute Neutrophil 7.4 1.6 - 8.3 10e9/L    Absolute Lymphocytes 2.2 0.8 - 5.3 10e9/L    Absolute Monocytes 0.5 0.0 - 1.3 10e9/L    Absolute Eosinophils 0.3 0.0 - 0.7 10e9/L    Absolute Basophils 0.1 0.0 - 0.2 10e9/L    Absolute Myelocytes 0.1 (H) 0 10e9/L    Reactive Lymphs Present     Platelet Estimate Confirming automated cell count    Comprehensive metabolic panel     Status: Abnormal   Result Value Ref Range    Sodium 141 133 - 144 mmol/L    Potassium 3.6 3.4 - 5.3 mmol/L    Chloride 109 94 - 109 mmol/L    Carbon Dioxide 25 20 - 32 mmol/L    Anion Gap 7 3 - 14 mmol/L    Glucose 119 (H) 70 - 99 mg/dL    Urea Nitrogen 9 7 - 30 mg/dL    Creatinine 0.65 (L) 0.66 - 1.25 mg/dL    GFR Estimate >90 >60 mL/min/[1.73_m2]    GFR Estimate If Black >90 >60 mL/min/[1.73_m2]    Calcium 9.7 8.5 - 10.1 mg/dL    Bilirubin Total 0.8 0.2 - 1.3 mg/dL    Albumin 3.8 3.4 - 5.0 g/dL    Protein Total 8.5 6.8 - 8.8 g/dL    Alkaline Phosphatase 109 40 - 150 U/L    ALT 24 0 - 70 U/L    AST 14 0 - 45 U/L   Lipase     Status: None   Result Value Ref Range    Lipase 134 73 - 393 U/L   EKG 12-lead, tracing only     Status: None (Preliminary result)   Result Value Ref Range    Interpretation ECG Click View Image link to view waveform and result    Care Management / Social Work IP Consult     Status: None ()    Joshua Cox, MSW     11/23/2020  8:27 PM  Care Management Initial Consult    General Information  Assessment completed with: Patient,    Type of CM/SW Visit: Denies Needs at Discharge  Primary Care Provider verified and updated as needed:     Readmission within the last 30 days:        Reason for Consult: community resources  Advance Care Planning:     Did not assess.       Communication Assessment  Patient's communication style: spoken language (English or   Bilingual)             Cognitive  Cognitive/Neuro/Behavioral:                        Living Environment:   People in home: spouse     Current living Arrangements:       Pt states he lives in a senior   facility/assisted living but has no services.  He states that he   lives with his wife who has memory care issues but she too has no   services.  He states they have access to more support but did not   give details.  Able to return to prior arrangements: yes       Family/Social Support:  Care provided by:  Self  Provides care for:  Uncertain how much care pt provides to his   wife.  He states that he is disabled and his wife used to care   for him before she became very sick and hospitalized for 4   months.                  Description of Support System: Supportive;Involved         Current Resources:   Skilled Home Care Services:  No.  Community Resources:  Pt states there are staff available at his   building, but writer uncertain to what capacity.  Equipment currently used at home:  Pt has a wheelchair at home.    Employment/Financial:  Employment Status: disabled        Financial Concerns:   Pt states he lives off $1600/month.            Lifestyle & Psychosocial Needs:        Socioeconomic History     Marital status: Single     Spouse name: Not on file     Number of children: 0     Years of education: Not on file     Highest education level: Not on file   Occupational History     Occupation: sales     Employer: UNEMPLOYED     Tobacco Use     Smoking status: Former Smoker     Packs/day: 0.50     Years: 1.00     Pack years: 0.50     Types: Cigarettes     Start date: 10/13/1983     Quit date: 1984     Years since quittin.4     Smokeless tobacco: Never Used   Substance and Sexual Activity     Alcohol use: No     Alcohol/week: 0.0 standard drinks     Comment: a pint of alcohol / day - last drink was 3/28/14     Drug use: Yes     Frequency: 2.0 times per week     Types: Marijuana     Comment: marijuana - occasional     Sexual activity: Yes     Partners: Female, Male         Additional Information:  ED SW consulted for transportation resources to get home tonight   and then  "come back into clinic tomorrow morning.    SW reviewed chart.  Met with pt, introduced self, role and reason for the visit. Pt   states he is calling and talking with his wife to help him   arrange care he needs at home and a ride to get him home tonight   and then another ride in the morning.  Pt states he is very upset and if the plan is to discharge he   asks that his IV be removed immediately and that he discharge.    He states he will figure out a way to get home.  SW attempted to offer transportation options but pt declined and   states he knows that \"all of your options will cost more money\"   which is true.  Transportation would be private pay to patient.    Pt voices frustration to writer stating that he and his wife used   to be employed in illustrious careers, however due to disability   and illness they are now retired and need more care.  SW offered attentive listening and supportive counseling.  Pt asks for SW to get his nurse to remove his IV immediately and   to be discharged.    KALEIGH updated RODERICK Castillo, and Dr. Park.    RAMIREZ HAGEN, PATTI Keating  She/her/hers  Social Work Services  Emergency Department & Obs  [phone] phone  [phone] pager  8:00am-8:30pm Mon-Sat    On-call pager, [phone], 4:00pm to midnight               Medications   0.9% sodium chloride BOLUS (0 mLs Intravenous Stopped 11/23/20 1840)   ondansetron (ZOFRAN) injection 4 mg (4 mg Intravenous Given 11/23/20 1759)        Assessments & Plan (with Medical Decision Making)   Patient nontoxic in no acute distress presents for nausea and vomiting along with a wound check.  Hemodynamically stable.  He was treated symptomatically with fluids and Zofran.  Laboratory work is unremarkable no signs for dehydration or any electrolyte abnormalities.  Orthopedics was consulted for cast removal as this will need to be either recasted or splinted and wound check.  Following orthopedic evaluation no signs for infection they " resplinted the patient and he is stable for discharge and outpatient follow-up tomorrow in the clinic.  I discussed with the patient his laboratory work and no current need for admission for medical reasons.  He was questioning about social issues including transportation home and to his appointment tomorrow.  I discussed with him that I will have social work evaluate for any further needs along with patient being nonweightbearing due to the surgery of the lower extremity.  He does have a wheelchair at home.  Following social work evaluation patient states that he is ready to be discharged he does not want any further resources from them.  Patient is stable for discharge and outpatient follow-up.    I have reviewed the nursing notes. I have reviewed the findings, diagnosis, plan and need for follow up with the patient.    Discharge Medication List as of 11/23/2020  8:19 PM          Final diagnoses:   Visit for wound check   Nausea       --  Myron Park MD  [facility] EMERGENCY DEPARTMENT  11/23/2020     Myron Park MD  11/23/20 2240       Myron Park MD  11/23/20 2248

## 2020-11-24 NOTE — CONSULTS
Care Management Initial Consult    General Information  Assessment completed with: Patient,    Type of CM/SW Visit: Denies Needs at Discharge  Primary Care Provider verified and updated as needed:     Readmission within the last 30 days:        Reason for Consult: community resources  Advance Care Planning:     Did not assess.       Communication Assessment  Patient's communication style: spoken language (English or Bilingual)             Cognitive  Cognitive/Neuro/Behavioral:                        Living Environment:   People in home: spouse     Current living Arrangements:      Pt states he lives in a senior facility/assisted living but has no services.  He states that he lives with his wife who has memory care issues but she too has no services.  He states they have access to more support but did not give details.  Able to return to prior arrangements: yes       Family/Social Support:  Care provided by:  Self  Provides care for:  Uncertain how much care pt provides to his wife.  He states that he is disabled and his wife used to care for him before she became very sick and hospitalized for 4 months.                  Description of Support System: Supportive;Involved         Current Resources:   Skilled Home Care Services:  No.  Community Resources:  Pt states there are staff available at his building, but writer uncertain to what capacity.  Equipment currently used at home:  Pt has a wheelchair at home.    Employment/Financial:  Employment Status: disabled        Financial Concerns:   Pt states he lives off $1600/month.            Lifestyle & Psychosocial Needs:        Socioeconomic History     Marital status: Single     Spouse name: Not on file     Number of children: 0     Years of education: Not on file     Highest education level: Not on file   Occupational History     Occupation: sales     Employer: UNEMPLOYED     Tobacco Use     Smoking status: Former Smoker     Packs/day: 0.50     Years: 1.00     Pack  "years: 0.50     Types: Cigarettes     Start date: 10/13/1983     Quit date: 1984     Years since quittin.4     Smokeless tobacco: Never Used   Substance and Sexual Activity     Alcohol use: No     Alcohol/week: 0.0 standard drinks     Comment: a pint of alcohol / day - last drink was 3/28/14     Drug use: Yes     Frequency: 2.0 times per week     Types: Marijuana     Comment: marijuana - occasional     Sexual activity: Yes     Partners: Female, Male         Additional Information:  ED SW consulted for transportation resources to get home tonight and then come back into clinic tomorrow morning.    SW reviewed chart.  Met with pt, introduced self, role and reason for the visit. Pt states he is calling and talking with his wife to help him arrange care he needs at home and a ride to get him home tonight and then another ride in the morning.  Pt states he is very upset and if the plan is to discharge he asks that his IV be removed immediately and that he discharge.  He states he will figure out a way to get home.  SW attempted to offer transportation options but pt declined and states he knows that \"all of your options will cost more money\" which is true.  Transportation would be private pay to patient.    Pt voices frustration to writer stating that he and his wife used to be employed in illustrious careers, however due to disability and illness they are now retired and need more care.  SW offered attentive listening and supportive counseling.  Pt asks for SW to get his nurse to remove his IV immediately and to be discharged.    KALEIGH updated RODERICK Castillo, and Dr. Park.    RAMIREZ HAGEN, PATTI Keating  She/her/hers  Social Work Services  Emergency Department & Obs  [phone] phone  [phone] pager  8:00am-8:30pm Mon-Sat    On-call pager, [phone], 4:00pm to midnight            "

## 2020-11-24 NOTE — DISCHARGE INSTRUCTIONS
Please make an appointment to follow up with Your Primary Care Provider follow-up with orthopedics tomorrow

## 2020-11-25 ENCOUNTER — OFFICE VISIT (OUTPATIENT)
Dept: ORTHOPEDICS | Facility: CLINIC | Age: 60
End: 2020-11-25
Payer: MEDICARE

## 2020-11-25 ENCOUNTER — PATIENT OUTREACH (OUTPATIENT)
Dept: CARE COORDINATION | Facility: CLINIC | Age: 60
End: 2020-11-25

## 2020-11-25 DIAGNOSIS — M76.61 ACHILLES TENDINITIS OF RIGHT LOWER EXTREMITY: Primary | ICD-10-CM

## 2020-11-25 DIAGNOSIS — M25.571 PAIN IN JOINT, ANKLE AND FOOT, RIGHT: ICD-10-CM

## 2020-11-25 PROCEDURE — 99207 PR NO CHARGE LOS: CPT

## 2020-11-25 NOTE — PROGRESS NOTES
Clinic Care Coordination Contact  [facility]/Voicemail       Clinical Data: Care Coordinator Outreach  Outreach attempted x 1.  Left message on patient's voicemail with call back information and requested return call.  Plan: Care Coordinator will try to reach patient again in 1-2 business days.

## 2020-11-25 NOTE — PROGRESS NOTES
Reason For Visit:   Chief Complaint   Patient presents with     Surgical Followup     2 wk pop DOS 11/11/2020 Right achilles debridement and partial calcaneus excision       There were no vitals taken for this visit.    Pain Assessment  Patient Currently in Pain: Yes  0-10 Pain Scale: 5    Shweta Galeano ATC    Reason for visit:    Erwin Aguilar came in to the clinic for a two week post op check.    His surgery was done 11/11/2020 by Dr Sandoval.  He had Right achilles debridement and partial calcaneus excision.    Assessment:    Erwin came into the clinic in anterior slab splint placed by the ED on monday Non-WB.    The Surgical wounds were exposed and found to be not sufficiently healed; so the sutures were not removed. I did remove the sutures in his right knee placed by the ED after surgery when he cut it on his knee scooter.     Plan:    I had Ju Rios come and take a look at patients surgical incision. She recommended a short leg cast with a window. His incision was very moist and mascerated. She advised to let it air out with the window and do dressing changes. Patient refused plan of care and would only allow us to place a anterior slab splint. He would not allow a posterior slab, even when Ju and PILLO both explained that this is safer for his procedure. Patient demanded that he did not want to have to care for his wound through a window and did not want anything to touch his right heel. He refused to listen to us and demanded that he wanted to follow-up with  and would only agree to a cast if  advised it. I got him set up for Tuesday 12/1/20 to see  (his next clinic day). I cleaned his surgical area with cloroprep. I then dressed his wound with Adaptic, guauze, ABD pad, cast padding, anterior slab plaster splint and ace wraps. He knows to not move ankle and to be very careful .      He was placed in another anterior slab splint.  He was told to Non-WB.     He has an appointment  to see Dr. Sandoval at that time Dr. Sandoval will determine further restrictions.    He has our phone number and will call with questions or problems.      Shweta Galeano, ATC

## 2020-12-01 ENCOUNTER — TELEPHONE (OUTPATIENT)
Dept: ORTHOPEDICS | Facility: CLINIC | Age: 60
End: 2020-12-01

## 2020-12-01 ENCOUNTER — OFFICE VISIT (OUTPATIENT)
Dept: ORTHOPEDICS | Facility: CLINIC | Age: 60
End: 2020-12-01
Payer: MEDICARE

## 2020-12-01 VITALS — BODY MASS INDEX: 28.17 KG/M2 | HEIGHT: 72 IN | WEIGHT: 208 LBS

## 2020-12-01 DIAGNOSIS — M25.571 PAIN IN JOINT, ANKLE AND FOOT, RIGHT: Primary | ICD-10-CM

## 2020-12-01 PROCEDURE — 99024 POSTOP FOLLOW-UP VISIT: CPT | Performed by: ORTHOPAEDIC SURGERY

## 2020-12-01 ASSESSMENT — MIFFLIN-ST. JEOR: SCORE: 1786.48

## 2020-12-01 NOTE — NURSING NOTE
Reason For Visit:   Chief Complaint   Patient presents with     RECHECK     3 week POP Right achilles debridement and partial calcaneus excision DOS: 11/11/2020       I

## 2020-12-01 NOTE — LETTER
12/1/2020         RE: Erwin Aguilar  [address]  [place] [address]        Dear Colleague,    Thank you for referring your patient, Erwin Aguilar, to the [facility] [place]. Please see a copy of my visit note below.    CHIEF COMPLAINT:  Status post right Achilles tendon debridement with partial calcaneus excision and subsequent reattachment performed on 11/11/2020.      HISTORY OF PRESENT ILLNESS:  Mr. Aguilar presents today for further followup.  The patient reports to be compliant with our restrictions.      PHYSICAL EXAMINATION:  On today's visit, he presented with a fair amount of maceration across the wound.  There is some dehiscence measuring approximately 1.5 cm x 1 cm along the midportion of the incision.  There is no odor.  There is no active drainage.  There is some maceration specifically to the wound.  Range of motion is approximately from 5 degrees of plantar flexion to 20 degrees of plantar flexion.  CMS is intact.      ASSESSMENT:  Status post right Achilles tendon debridement with partial calcaneus excision and subsequent reattachment with current wound dehiscence.      PLAN:  I discussed with the patient that at this point I would like him to be evaluated by Dr. Anaya to proceed with wound management, most likely with application of a VAC dressing.      The patient presented the option of requesting a below-the-knee amputation with his right lower extremity.  I discussed with him that at this point we do not have any new features that it will make us propose such intervention.  The patient advocated that he has had problems with the right leg for many years and he does not believe that it is going to work properly.  I also discussed with him that those features were present prior to this most recent surgery and still we did not think that that would be a good option.      I also discussed with him the importance of having a proper attitude and approach to his  health, as if he is convinced that the right foot will not work for him, it does not really matter what we will do for him, that eventually he will be disappointed and frustrated enough that he would require a below-the-knee amputation.      For the time being, he is going to continue his care with Dr. Anaya.  He was placed in a CAM Walker with a wedge in order to allow some lack of tension to the Achilles tendon while still having access to the wound.  He will proceed with dry-to-dry dressing changes between b.i.d. and t.i.d. until further notice.      Once the wound is healed, the patient will bounce back to me from Dr. Anaya to manage the Achilles tendon and the rest of his recovery.        The patient will continue to remain nonweightbearing until further notice as well.      All questions were answered.  The patient was pleased with the discussion.       cc:   Joel Wegener, MD    [facility]    [address] [place] [address]         Eh Sandoval MD

## 2020-12-01 NOTE — TELEPHONE ENCOUNTER
I called the patient back this afternoon to answer his questions and per Dr. Sandoval set him up for an appointment with Dr. Anaya. Patient refused to st up an appointment before speaking with Dr. Anaya. I was able to schedule him for a phone appointment tomorrow to discuss.    Pao Ramires, ATC

## 2020-12-01 NOTE — PROGRESS NOTES
CHIEF COMPLAINT:  Status post right Achilles tendon debridement with partial calcaneus excision and subsequent reattachment performed on 11/11/2020.      HISTORY OF PRESENT ILLNESS:  Mr. Aguilar presents today for further followup.  The patient reports to be compliant with our restrictions.      PHYSICAL EXAMINATION:  On today's visit, he presented with a fair amount of maceration across the wound.  There is some dehiscence measuring approximately 1.5 cm x 1 cm along the midportion of the incision.  There is no odor.  There is no active drainage.  There is some maceration specifically to the wound.  Range of motion is approximately from 5 degrees of plantar flexion to 20 degrees of plantar flexion.  CMS is intact.      ASSESSMENT:  Status post right Achilles tendon debridement with partial calcaneus excision and subsequent reattachment with current wound dehiscence.      PLAN:  I discussed with the patient that at this point I would like him to be evaluated by Dr. Anaya to proceed with wound management, most likely with application of a VAC dressing.      The patient presented the option of requesting a below-the-knee amputation with his right lower extremity.  I discussed with him that at this point we do not have any new features that it will make us propose such intervention.  The patient advocated that he has had problems with the right leg for many years and he does not believe that it is going to work properly.  I also discussed with him that those features were present prior to this most recent surgery and still we did not think that that would be a good option.      I also discussed with him the importance of having a proper attitude and approach to his health, as if he is convinced that the right foot will not work for him, it does not really matter what we will do for him, that eventually he will be disappointed and frustrated enough that he would require a below-the-knee amputation.      For the time  being, he is going to continue his care with Dr. Anaya.  He was placed in a CAM Walker with a wedge in order to allow some lack of tension to the Achilles tendon while still having access to the wound.  He will proceed with dry-to-dry dressing changes between b.i.d. and t.i.d. until further notice.      Once the wound is healed, the patient will bounce back to me from Dr. Anaya to manage the Achilles tendon and the rest of his recovery.        The patient will continue to remain nonweightbearing until further notice as well.      All questions were answered.  The patient was pleased with the discussion.       cc:   Joel Wegener, MD    [facility]    [address] [place], [place] [id number]

## 2020-12-01 NOTE — TELEPHONE ENCOUNTER
[facility] Call Center    Phone Message    May a detailed message be left on voicemail: yes     Reason for Call: Other: This pt of Dr. Anaya would like a call back from Dr. Anaya or someone on his care team regarding his care. The pt had an appt today with Dr. Sandoval. The pt is confused about why he has been transitioned to another doctor and where his care is going. Please have someone on Dr. Anaya's care team reach out to the pt regarding these concers,     Action Taken: Message routed to:  [facility] Ortho    Travel Screening: Not Applicable

## 2020-12-02 ENCOUNTER — VIRTUAL VISIT (OUTPATIENT)
Dept: ORTHOPEDICS | Facility: CLINIC | Age: 60
End: 2020-12-02
Payer: MEDICARE

## 2020-12-02 DIAGNOSIS — M76.61 ACHILLES TENDINITIS OF RIGHT LOWER EXTREMITY: Primary | ICD-10-CM

## 2020-12-02 PROCEDURE — 99207 PR NO CHARGE LOS: CPT | Mod: TEL | Performed by: PODIATRIST

## 2020-12-02 NOTE — PROGRESS NOTES
"Erwin Aguilar is a 60 year old male who is being evaluated via a billable telephone visit.      The patient has been notified of following:     \"This telephone visit will be conducted via a call between you and your physician/provider. We have found that certain health care needs can be provided without the need for a physical exam.  This service lets us provide the care you need with a short phone conversation.  If a prescription is necessary we can send it directly to your pharmacy.  If lab work is needed we can place an order for that and you can then stop by our lab to have the test done at a later time.    Telephone visits are billed at different rates depending on your insurance coverage. During this emergency period, for some insurers they may be billed the same as an in-person visit.  Please reach out to your insurance provider with any questions.    If during the course of the call the physician/provider feels a telephone visit is not appropriate, you will not be charged for this service.\"    Patient has given verbal consent for Telephone visit?  Yes    What phone number would you like to be contacted at? [phone]    How would you like to obtain your AVS? [facility]    Phone call duration: 2 minutes    Victor M Anaya DPM    I called Erwin today regarding his ulcer. He was initially on the schedule for next Monday, but did not want to come in until we spoke on the phone. He wanted to know the goal of treatment, which I reiterated is to heal the ulcer. He wanted to know prognosis and plan. I have not seen this ulcer, and will need to see him in clinic. He acknowledges this and will come in.   "

## 2020-12-02 NOTE — LETTER
"    12/2/2020         RE: Erwin Aguilar  [address] [place] [address]        Dear Colleague,    Thank you for referring your patient, Erwin Aguilar, to the [facility] [place]. Please see a copy of my visit note below.    Erwin Aguilar is a 60 year old male who is being evaluated via a billable telephone visit.      The patient has been notified of following:     \"This telephone visit will be conducted via a call between you and your physician/provider. We have found that certain health care needs can be provided without the need for a physical exam.  This service lets us provide the care you need with a short phone conversation.  If a prescription is necessary we can send it directly to your pharmacy.  If lab work is needed we can place an order for that and you can then stop by our lab to have the test done at a later time.    Telephone visits are billed at different rates depending on your insurance coverage. During this emergency period, for some insurers they may be billed the same as an in-person visit.  Please reach out to your insurance provider with any questions.    If during the course of the call the physician/provider feels a telephone visit is not appropriate, you will not be charged for this service.\"    Patient has given verbal consent for Telephone visit?  Yes    What phone number would you like to be contacted at? [phone]    How would you like to obtain your AVS? [facility]    Phone call duration: 2 minutes    Victor M Anaya DPM    I called Erwin today regarding his ulcer. He was initially on the schedule for next Monday, but did not want to come in until we spoke on the phone. He wanted to know the goal of treatment, which I reiterated is to heal the ulcer. He wanted to know prognosis and plan. I have not seen this ulcer, and will need to see him in clinic. He acknowledges this and will come in.       Victor M Anaya DPM    "

## 2020-12-07 ENCOUNTER — OFFICE VISIT (OUTPATIENT)
Dept: ORTHOPEDICS | Facility: CLINIC | Age: 60
End: 2020-12-07
Payer: MEDICARE

## 2020-12-07 DIAGNOSIS — E11.42 TYPE 2 DIABETES MELLITUS WITH DIABETIC POLYNEUROPATHY, WITH LONG-TERM CURRENT USE OF INSULIN (H): Primary | ICD-10-CM

## 2020-12-07 DIAGNOSIS — L97.313 SKIN ULCER OF RIGHT ANKLE WITH NECROSIS OF MUSCLE (H): ICD-10-CM

## 2020-12-07 DIAGNOSIS — Z79.4 TYPE 2 DIABETES MELLITUS WITH DIABETIC POLYNEUROPATHY, WITH LONG-TERM CURRENT USE OF INSULIN (H): Primary | ICD-10-CM

## 2020-12-07 PROCEDURE — 99024 POSTOP FOLLOW-UP VISIT: CPT | Performed by: PODIATRIST

## 2020-12-07 NOTE — NURSING NOTE
Reason For Visit:   Chief Complaint   Patient presents with     RECHECK     ankle wound      There were no vitals taken for this visit.    Pain Assessment  Patient Currently in Pain: Yeison Ward ATC

## 2020-12-07 NOTE — LETTER
12/7/2020         RE: Erwin Aguilar  [address]  [place] [address]        Dear Colleague,    Thank you for referring your patient, Erwin Aguilar, to the [facility] [place]. Please see a copy of my visit note below.    Chief Complaint:   Chief Complaint   Patient presents with     RECHECK     ankle wound           Allergies   Allergen Reactions     Levaquin Anaphylaxis and Rash     Swelling in lip and tongue felt thick     Lisinopril Anaphylaxis     Swollen tongue; inability to swallow; drooling; hives; swollen face, neck, angioedema     Acetaminophen      Hx of cirrhosis      Amlodipine      Swelling, hives, possible angioedema       Morphine      b/p dropped and arms went numb  Hypotension     Quinolones Hives     Bactrim [Sulfamethoxazole W/Trimethoprim] Rash         Subjective: Erwin is a 60 year old male who presents to the clinic today for a follow up of right Achilles ulceration. He recently had a right Achilles debridement and partial calc excision. He now has a dehiscence at the incision site. The area does drain a significant amount of fluid.He currently lives in an AL building. Nursing is helping him with the dressings.     Objective  Data Unavailable Data Unavailable Data Unavailable Data Unavailable Data Unavailable 0 lbs 0 oz        A dehiscence wound is noted at right  posterior heel measuring 2cm x 1cm x 3cm.    Mix Classification: 2    Wound base: Yellow/Achilles    Edges: maceration    Drainage: copious/serosanguinous    Odor: no    Undermining: no    Bone Exposure: No    Clinical Signs of Infection: No    After obtaining patient consent, the wound was irrigated with copious amounts of saline.     Barriers to Healing: Erwin is a type 2 diabetic, but has controlled A1c. Wound is deep and more difficult to heal.     Treatment Plan: DSD for now. Will order VAC.     Pt Ability to Follow Plan: Good.     Assessment: Erwin is a 61 YO with right posterior heel  dehiscence s/p surgery in November.     Plan:   - Pt seen and evaluated  - Will cont with dressing changes for now. I ordered him a VAC.  - Pt to return to clinic in 10 days for VAC placement.       Victor M Anaya DPM

## 2020-12-07 NOTE — PATIENT INSTRUCTIONS
Change the dressing daily M-F. Cleanse with Microklenz. Pat dry. Apply a piece of IoPlex to the ulcer, followed by gauze, ABD, and rolled gauze.

## 2020-12-08 NOTE — PROGRESS NOTES
Chief Complaint:   Chief Complaint   Patient presents with     RECHECK     ankle wound           Allergies   Allergen Reactions     Levaquin Anaphylaxis and Rash     Swelling in lip and tongue felt thick     Lisinopril Anaphylaxis     Swollen tongue; inability to swallow; drooling; hives; swollen face, neck, angioedema     Acetaminophen      Hx of cirrhosis      Amlodipine      Swelling, hives, possible angioedema       Morphine      b/p dropped and arms went numb  Hypotension     Quinolones Hives     Bactrim [Sulfamethoxazole W/Trimethoprim] Rash         Subjective: Erwin is a 60 year old male who presents to the clinic today for a follow up of right Achilles ulceration. He recently had a right Achilles debridement and partial calc excision. He now has a dehiscence at the incision site. The area does drain a significant amount of fluid.He currently lives in an AL building. Nursing is helping him with the dressings.     Objective  Data Unavailable Data Unavailable Data Unavailable Data Unavailable Data Unavailable 0 lbs 0 oz        A dehiscence wound is noted at right  posterior heel measuring 2cm x 1cm x 3cm.    Mix Classification: 2    Wound base: Yellow/Achilles    Edges: maceration    Drainage: copious/serosanguinous    Odor: no    Undermining: no    Bone Exposure: No    Clinical Signs of Infection: No    After obtaining patient consent, the wound was irrigated with copious amounts of saline.     Barriers to Healing: Erwin is a type 2 diabetic, but has controlled A1c. Wound is deep and more difficult to heal.     Treatment Plan: DSD for now. Will order VAC.     Pt Ability to Follow Plan: Good.     Assessment: Erwin is a 59 YO with right posterior heel dehiscence s/p surgery in November.     Plan:   - Pt seen and evaluated  - Will cont with dressing changes for now. I ordered him a VAC.  - Pt to return to clinic in 10 days for VAC placement.

## 2020-12-10 DIAGNOSIS — L97.313 SKIN ULCER OF RIGHT ANKLE WITH NECROSIS OF MUSCLE (H): ICD-10-CM

## 2020-12-10 DIAGNOSIS — Z79.4 TYPE 2 DIABETES MELLITUS WITH DIABETIC POLYNEUROPATHY, WITH LONG-TERM CURRENT USE OF INSULIN (H): Primary | ICD-10-CM

## 2020-12-10 DIAGNOSIS — E11.42 TYPE 2 DIABETES MELLITUS WITH DIABETIC POLYNEUROPATHY, WITH LONG-TERM CURRENT USE OF INSULIN (H): Primary | ICD-10-CM

## 2020-12-11 ENCOUNTER — HOME CARE/HOSPICE - HEALTHEAST (OUTPATIENT)
Dept: HOME HEALTH SERVICES | Facility: HOME HEALTH | Age: 60
End: 2020-12-11

## 2020-12-18 ENCOUNTER — TELEPHONE (OUTPATIENT)
Dept: ORTHOPEDICS | Facility: CLINIC | Age: 60
End: 2020-12-18

## 2020-12-18 ENCOUNTER — MEDICAL CORRESPONDENCE (OUTPATIENT)
Dept: HEALTH INFORMATION MANAGEMENT | Facility: CLINIC | Age: 60
End: 2020-12-18

## 2020-12-18 NOTE — TELEPHONE ENCOUNTER
Rhina was calling wondering what we want them to do. I told her that we are tyring to coordinate an appointment for Erwin to get it placed but that is difficult as he has trouble getting a ride and does not want to have it placed until he knows that he has follow up home care. Rhina will call him back to let him know that they will be able to care for him and will schedule for two days after the vac is placed.    Once Vac is place please send a clinic note and order from 12/2/20 to [facility] at [phone] (fax)

## 2020-12-18 NOTE — TELEPHONE ENCOUNTER
[facility] Call Center    Phone Message    May a detailed message be left on voicemail: yes     Reason for Call: Other: Rhina is calling because patient does not have wound vac applied. So they want to know how  wants them to proceed.      Action Taken: Other: ORTHO    Travel Screening: Not Applicable

## 2020-12-22 ENCOUNTER — MYC MEDICAL ADVICE (OUTPATIENT)
Dept: FAMILY MEDICINE | Facility: CLINIC | Age: 60
End: 2020-12-22

## 2020-12-22 ENCOUNTER — TELEPHONE (OUTPATIENT)
Dept: ORTHOPEDICS | Facility: CLINIC | Age: 60
End: 2020-12-22

## 2020-12-22 NOTE — TELEPHONE ENCOUNTER
Attempt to call 9:54am.  LVM, I will call back in 30 min  Atempt to call 10:39am  LVM, I will call back in 1 hour  Attempt to call 11:40am LVM, I will call back in 1 hour    Reached patient at home number at 12:53 am    20 minute discussion with patient centered around his care concerns.  He voiced frustration that we are not on the same page for treatment plan.  We are encouraging healing and patient seeking amputation of foot. Patient expresses he is concerned pain will remain even after the wound heals, desires to have foot amputated.  He is adamant he will follow through with wound vac placement after Dr. Sandoval and Jaclyn discuss with Patient the plan for after wound healing.  He is not interested in placing the wound vac until he has this conversation with the Providers so he can decide if he will seek another opinion.    JOSE DaleT

## 2020-12-23 ENCOUNTER — TELEPHONE (OUTPATIENT)
Dept: ORTHOPEDICS | Facility: CLINIC | Age: 60
End: 2020-12-23

## 2020-12-23 ENCOUNTER — TELEPHONE (OUTPATIENT)
Dept: OTHER | Facility: CLINIC | Age: 60
End: 2020-12-23

## 2020-12-23 ENCOUNTER — MYC MEDICAL ADVICE (OUTPATIENT)
Dept: FAMILY MEDICINE | Facility: CLINIC | Age: 60
End: 2020-12-23

## 2020-12-23 DIAGNOSIS — S91.301D OPEN WOUND OF RIGHT HEEL, SUBSEQUENT ENCOUNTER: Primary | ICD-10-CM

## 2020-12-23 NOTE — TELEPHONE ENCOUNTER
[facility] Call Center    Phone Message    May a detailed message be left on voicemail: yes     Reason for Call: Other: Request a call back today from Swathi to talk about patient's situation.     Action Taken: Message routed to:  [facility] Orthopedic    Travel Screening: Not Applicable

## 2020-12-24 ENCOUNTER — PATIENT OUTREACH (OUTPATIENT)
Dept: NURSING | Facility: CLINIC | Age: 60
End: 2020-12-24
Payer: MEDICARE

## 2020-12-24 ENCOUNTER — TELEPHONE (OUTPATIENT)
Dept: ORTHOPEDICS | Facility: CLINIC | Age: 60
End: 2020-12-24

## 2020-12-24 ENCOUNTER — TELEPHONE (OUTPATIENT)
Dept: CARE COORDINATION | Facility: CLINIC | Age: 60
End: 2020-12-24

## 2020-12-24 DIAGNOSIS — L97.313 SKIN ULCER OF RIGHT ANKLE WITH NECROSIS OF MUSCLE (H): Primary | ICD-10-CM

## 2020-12-24 NOTE — TELEPHONE ENCOUNTER
Brief orthopedic telephone note:    The patient reached out to the resident on-call line this evening to report that he had no dressings on his Achilles heel wound.  He is approximately 6 weeks status post right Achilles tendon debridement with partial calcaneus excision and subsequent reattachment performed on November 11 with Dr. Sandoval.    He expresses frustration over the lack of communication from the Orthopedics team in efforts to get him dressing supplies since his surgery.  He has a wound VAC that is currently in the box and not being utilized.  He was supposed to see Dr. Anaya this week to put the Vac dressing on, but unfortunately Dr. Tello was out of clinic and his clinic appointment needed to be cancelled.  In addition, the ride which he previously held to bring him to his appointment is no longer able to do so, so he is unable to come to clinic for wound cares or to  any dressing supplies.    I validated the patient's concerns, as he does have an open wound that needs to be cared for.  With a chronically draining wound which is poorly managed, he is at high risk for developing an infection.    I discussed with Mr. Aguilar that there is not any good options at the moment for me to get him dressing supplies, as it is currently 11:30 PM.      However, I did tell the patient that I can call [facility] medical supply when they open in the morning to see if a verbal order can be placed for dressings and if they would be able to deliver them to him.  I will call him in the morning to update him on any progress that is able to be made thru [facility].    I also placed a Care Coordinator referral for the patient this evening, to address his multiple needs (financial, transportation, etc.).  I will discuss this referral to him tomorrow when I speak with him regarding any progress with the dressing supplies.    Myron Duron MD  Orthopaedic Surgery PGY-4  #: [phone]      ADDENDUM:    [facility] this morning (12/24, 0845) to place an order for dressings for Mr. Aguilar's wound.  This include MicroKlenz spray, Xeroform, 4x4 gauze, kerlix, and an ACE wrap.   They were able to accept a verbal order and reported they should be able to fulfill the order today and get it shipped this afternoon.     I called Mr. Aguilar to update him on the order that had been placed and he was thankful for the efforts and returned call.

## 2020-12-24 NOTE — TELEPHONE ENCOUNTER
Thank you.  I believe this was addressed now by the orthopedic on-call team and then followed up on by their care coordinator/providers.     If not the home care orders, wound care, other items for care of his achilles surgical wound should be handled by the orthopedic team.     Joel Wegener,MD

## 2020-12-24 NOTE — TELEPHONE ENCOUNTER
Care Coordination received referral to reach out to patient to offer care coordination services. Upon my call we discussed home care to help with wound care supplies and dressing as both patient and partner are disabled and can't drive to wound care clinic to  supplies. However, Erwin states that a home referral was placed, but he has not heard from Home Care. Requesting for new home care to be placed for wound care services.    Thank you,    Krystian Zheng, DAVION  Clinic Care Coordination  [facility] [place], [place], and [place]  Phone: [phone]

## 2020-12-24 NOTE — TELEPHONE ENCOUNTER
Pt has refused the wound vac treatments which were ordered to encourage wound healing by wound specialist podiatrist Dr Anaya, who knows the pt well.  Pt has a deep open wound, copious/serosanguinous drainage, right Achilles ulceration, on 12/7/20  right  posterior heel measuring 2cm x 1cm x 3cm.    Pt stated he is unable to afford dressings for daily dressing changes or [employer] to get him to clinic for dressing pickup, and he has chosen to also avoid possible covid exposure coming to clinic.     The patient is requesting the following:            An order for nursing support at home.            Supplies delivered to him through [employer] or other delivery service.     Order for dressings that were used prior to the wound vac were faxed to [facility] [phone] per pt's request, and enter Home Care Referral for nurse visits.  Pt was called with this message.  Pt states some supplies has been ordered already, but he is looking forward to getting additional supplies as needed.    [facility] phone is [phone].  [facility] phone is [phone].    Carolina Morris RN

## 2020-12-24 NOTE — PROGRESS NOTES
Clinic Care Coordination Contact  Community Health Worker Initial Outreach    CHW Initial Information Gathering:  Referral Source: Care Team  Preferred Hospital: [facility]  [phone]  Preferred Urgent Care: [facility], [phone]  Current living arrangement:: I live in assisted living  Type of residence:: Assisted living  Community Resources: None  Supplies used at home:: Wound Care Supplies, Diabetic Supplies  Equipment Currently Used at Home: cane, straight, wheelchair, manual  Informal Support system:: Family, Friends, Partner, Other  No PCP office visit in Past Year: No  Transportation means:: Medical transport  CHW Additional Questions  If ED/Hospital discharge, follow-up appointment scheduled as recommended?: N/A  Medication changes made following ED/Hospital discharge?: N/A  MyChart active?: Yes  Patient sent Social Determinants of Health questionnaire?: Yes    Patient accepts CC: Yes. Patient scheduled for assessment with CHRISTEN Mckeon, on 12/28/20 at 9AM. Patient noted desire to discuss CC.     Spoke to Erwin  CHW introduced self and intent of call regarding the following.  Financial Support: Medication Affordability  and dressing cares, Patient/Caregiver Support: Resources for Support, Resources for Transportation   Additional pertinent details:  Patient is a 60M with a history of a SCI and subsequent disability. Limited ability to attend clinic appointments, also partially responsible for caring for his wife, who is also recently disabled.  He has an open, draining wound on his foot and has been unable to get dressing supplies.  Appreciate any assistance you can provide for transportation, financial counseling, and community resources to assist him in his care.    Erwin acknowledged stating that he had surgery done a while ago and has an open wound that has not healed.  Patient contacted the clinic yesterday as he was  "supposed to get a wound vac, but the appointment was canceled since the provider had a family emergency.  The wound clinic then instructed the patient that he would need to come to the clinic to  his wound care supplies.  However, since the patient is unable to drive as both he and his wife are disabled he was not able to come to the clinic, which frustrated the patient.  Patient was finally able to get through to an on call doctor late last night who put in an order for wound supplies to be sent to his home from [facility].    CHW inquired if patient ever heard from Home Care.  Which patient states that an order was places, but \"[facility] dropped the ball\" he has not heard from home care.  CHW will send a request to PCP and/or orthopedics team to enter new home care referral.    We discussed reason for referral regarding financial support.  Patient states that he makes $1600 a month off disability and because he's disabled has no way to get anywhere.    CHW introduced self and roles with CC.  CHW offered follow up with CC to assess for additional support and resources.  Patient agreed.    Krystian Zheng DAVION  Clinic Care Coordination  [facility] : [place], [place], and [place]  Phone: [phone]        "

## 2020-12-24 NOTE — TELEPHONE ENCOUNTER
[facility] Call Center    Phone Message    May a detailed message be left on voicemail: no     Reason for Call: Other: patient left voicemail saying he doesn't have any bandages     Action Taken: Message routed to:  [facility]    Travel Screening: Not Applicable

## 2020-12-28 NOTE — TELEPHONE ENCOUNTER
Carolina GARNER RN for Dr. Sandoval reached out to this patient and handled all concerns with bandages and home care orders.     JOSE DaleT

## 2020-12-31 ENCOUNTER — MYC MEDICAL ADVICE (OUTPATIENT)
Dept: FAMILY MEDICINE | Facility: CLINIC | Age: 60
End: 2020-12-31

## 2020-12-31 ENCOUNTER — TELEPHONE (OUTPATIENT)
Dept: ORTHOPEDICS | Facility: CLINIC | Age: 60
End: 2020-12-31

## 2020-12-31 ENCOUNTER — PATIENT OUTREACH (OUTPATIENT)
Dept: NURSING | Facility: CLINIC | Age: 60
End: 2020-12-31
Payer: MEDICARE

## 2020-12-31 DIAGNOSIS — S91.301A OPEN WOUND OF RIGHT HEEL: Primary | ICD-10-CM

## 2020-12-31 SDOH — HEALTH STABILITY: PHYSICAL HEALTH: ON AVERAGE, HOW MANY DAYS PER WEEK DO YOU ENGAGE IN MODERATE TO STRENUOUS EXERCISE (LIKE A BRISK WALK)?: 0 DAYS

## 2020-12-31 SDOH — SOCIAL STABILITY: SOCIAL INSECURITY: ARE YOU MARRIED, WIDOWED, DIVORCED, SEPARATED, NEVER MARRIED, OR LIVING WITH A PARTNER?: MARRIED

## 2020-12-31 SDOH — HEALTH STABILITY: PHYSICAL HEALTH: ON AVERAGE, HOW MANY MINUTES DO YOU ENGAGE IN EXERCISE AT THIS LEVEL?: 0 MIN

## 2020-12-31 SDOH — ECONOMIC STABILITY: TRANSPORTATION INSECURITY: IN THE PAST 12 MONTHS, HAS LACK OF TRANSPORTATION KEPT YOU FROM MEDICAL APPOINTMENTS OR FROM GETTING MEDICATIONS?: NO

## 2020-12-31 ASSESSMENT — ACTIVITIES OF DAILY LIVING (ADL)
LACK_OF_TRANSPORTATION: NO
DEPENDENT_IADLS:: TRANSPORTATION

## 2020-12-31 NOTE — TELEPHONE ENCOUNTER
[facility] Call Center    Phone Message    May a detailed message be left on voicemail: yes     Reason for Call: Other: Roland has been asked by  to get more info on patients care. They would like to discuss about home care orders and dressings for the patient. If you could reach back out to Roland to further discuss this.      Action Taken: Other: ORTHO    Travel Screening: Not Applicable                                                                           .

## 2020-12-31 NOTE — PROGRESS NOTES
Clinic Care Coordination Contact    Follow Up Progress Note   11/23/2020  Post op problem   Bone spur removal from achilles tendon on 11/11/20. Here for drainage noted to posterior ankle cast site. Also, pt complains of nausea and inability to keep down solid food.     Assessment:   PCP requests CC RN's assistance for home care services and dressing supplies   CC spoke to Katie at [facility] ( [phone]) and she states the patient was to call after wound vac was placed.  No return call from patient and several attempts on their end to contact patient .  Katie states they will be able to see the patient Saturday and request Dr Sandoval (Podiatrist )Home care order be faxed to her at Fax# [phone].  [facility] and last PCP and Podiatrist notes .    Patient returned the wound vac due to Jaclyn Wallis family emergency he will be back 1/4   Patient is requesting a new wound vac order.  CC will send a message to Dr Tello  Patient will take an Uber to [facility] to get some dressings     Patient informed and agrees with the plan      Goals addressed this encounter:   Goals Addressed                 This Visit's Progress      Home care (pt-stated)   50%     Goal Statement: I would like getting help with home care, wound care in the next month.   Date Goal set: 12/28/2020  Barriers: lack of openings for home care   Strengths: asking for help   Date to Achieve By: 1/28/2021  Patient expressed understanding of goal: yes   Action steps to achieve this goal:  1. I will wait for another home care to call out to me.   2. I will talk with my orthopedic clinic            Medical (pt-stated)   50%     Goal Statement: I would like help getting nursing supplies in the next month.   Date Goal set: 12/28/2020  Barriers: transportation concerns.   Strengths: advocating for self   Date to Achieve By: 1/28/2021  Patient expressed understanding of goal: yes  Action steps to achieve  this goal:  1. I will wait to see if [employer] delivers any supplies.   2. I will contact CC if I am in need of getting further help                  Intervention/Education provided during outreach: CC introduced self as the new clinic C RN      Outreach Frequency: 2 weeks    Plan:  CC spoke to Carolina Sandoval  RN  ([phone]) and she will fax wound vac and dressing orders to [facility]   [phone])    Care Coordinator will follow up in 3-5 business days     [facility]   Echo Varghese RN, Care Coordinator   [facility] and [place]   E-mail [email or web address]   [phone]

## 2020-12-31 NOTE — TELEPHONE ENCOUNTER
Roland was called back by RN and voicemail was left that we would appreciate any help she can provide for getting Erwin sterile dry dressings for daily changes for now since he has been refusing wound vac which was recommended by podiatrist Dr Anaya.  Roland was asked to call back directly today to make a plan.  Carolina Morris RN  [phone]    Roland phoned back and asked that we fax dressing change order and wound vac order to [facility] fax [phone], their phone number [phone].  We will send the orders and referral as requested.  Pt had returned the wound vac previously, so we will send again.  Roland informed us that they may make a home visit in two days.  Carolina Morris RN

## 2021-01-07 ENCOUNTER — MYC MEDICAL ADVICE (OUTPATIENT)
Dept: ORTHOPEDICS | Facility: CLINIC | Age: 61
End: 2021-01-07

## 2021-01-08 ENCOUNTER — TELEPHONE (OUTPATIENT)
Dept: FAMILY MEDICINE | Facility: CLINIC | Age: 61
End: 2021-01-08

## 2021-01-08 ENCOUNTER — DOCUMENTATION ONLY (OUTPATIENT)
Dept: ORTHOPEDICS | Facility: CLINIC | Age: 61
End: 2021-01-08

## 2021-01-08 ENCOUNTER — OFFICE VISIT (OUTPATIENT)
Dept: ORTHOPEDICS | Facility: CLINIC | Age: 61
End: 2021-01-08
Payer: MEDICARE

## 2021-01-08 ENCOUNTER — PATIENT OUTREACH (OUTPATIENT)
Dept: NURSING | Facility: CLINIC | Age: 61
End: 2021-01-08
Payer: MEDICARE

## 2021-01-08 ENCOUNTER — MEDICAL CORRESPONDENCE (OUTPATIENT)
Dept: HEALTH INFORMATION MANAGEMENT | Facility: CLINIC | Age: 61
End: 2021-01-08

## 2021-01-08 VITALS — BODY MASS INDEX: 28.17 KG/M2 | WEIGHT: 208 LBS | HEIGHT: 72 IN

## 2021-01-08 DIAGNOSIS — E11.42 TYPE 2 DIABETES MELLITUS WITH DIABETIC POLYNEUROPATHY, WITH LONG-TERM CURRENT USE OF INSULIN (H): ICD-10-CM

## 2021-01-08 DIAGNOSIS — L97.313 SKIN ULCER OF RIGHT ANKLE WITH NECROSIS OF MUSCLE (H): Primary | ICD-10-CM

## 2021-01-08 DIAGNOSIS — Z79.4 TYPE 2 DIABETES MELLITUS WITH DIABETIC POLYNEUROPATHY, WITH LONG-TERM CURRENT USE OF INSULIN (H): ICD-10-CM

## 2021-01-08 PROCEDURE — 99024 POSTOP FOLLOW-UP VISIT: CPT | Performed by: PODIATRIST

## 2021-01-08 ASSESSMENT — MIFFLIN-ST. JEOR: SCORE: 1786.48

## 2021-01-08 ASSESSMENT — ACTIVITIES OF DAILY LIVING (ADL): DEPENDENT_IADLS:: TRANSPORTATION

## 2021-01-08 NOTE — PROGRESS NOTES
After patients visit with Dr. Anaya today 1/8/21 I went in to make a follow up appointment with Dr. Sandoval and then a 2 week follow up with Dr. Anaya per Dr. Cash plan. I offered the appoinment with Dr. Sandoval which was at 7:30 am on the 12th of January. Only slots available were in the early morning and Dr. Sandoval's team was not available for consult on scheduling. Patient declined because he would like his Significant other to come with and didn't want that early appointment. I offered an appointment the following week around at 7:20 am which was the only available open appointment and he declined that as well did not want to follow up with Dr. Sandoval anymore. Patient also declined the 2 week follow up with Dr. Anaya.

## 2021-01-08 NOTE — PROGRESS NOTES
Chief Complaint:   Chief Complaint   Patient presents with     RECHECK     wound check follow up.           Allergies   Allergen Reactions     Levaquin Anaphylaxis and Rash     Swelling in lip and tongue felt thick     Lisinopril Anaphylaxis     Swollen tongue; inability to swallow; drooling; hives; swollen face, neck, angioedema     Acetaminophen      Hx of cirrhosis      Amlodipine      Swelling, hives, possible angioedema       Morphine      b/p dropped and arms went numb  Hypotension     Quinolones Hives     Bactrim [Sulfamethoxazole W/Trimethoprim] Rash         Subjective: Erwin is a 60 year old male who presents to the clinic today for a follow up of right Achilles ulceration. He recently had a right Achilles debridement and partial calc excision. He now has a dehiscence at the incision site.  Since his last visit here, he has had contact with multiple staff members here in our clinic.  In the interim since his last visit, I did order a wound VAC for him.  He had issues getting home nursing to come to him.  We did work on getting home nursing.  At that time, the home nursing agency had let us know that they try to get in contact with him but could not.  I personally called senior home care today and he does have an order with them and they saw him on January 2.  All of the previous notes were reviewed today.  Today he relates that somebody has told him that he will never walk on this foot again.  He relates that he has had prior back surgeries that have complications.  He is quite upset and frustrated that he is not have this VAC placed.  The previous VAC that was ordered was sent back to [facility].  He relates that he wants answers about what is going to happen to his foot in the future.  He relates that he has a neurosurgeon friend in [place] who has told him that his back issues are impeding his wound healing in his leg.  He relates that his brother was a diabetic and he saw his brother have multiple  "complications with his feet.  He relates that his brothers condition improved drastically when he had bilateral amputations.  He has not seen Dr. Fritz in some time.  He relates that he wants a plan about what is going to happen with his weightbearing status of this leg.  He relates that he is resigned that he might need an amputation of this leg.  This has not been a consideration here in our clinic. He relaets that he has had other opinions on this leg.     Objective  Data Unavailable Data Unavailable Data Unavailable Data Unavailable 6' 0\" 208 lbs 0 oz            A dehiscence wound is noted at right  posterior heel measuring 2.9cm x 1.6cm x 2cm.    Mix Classification: 2    Wound base: Yellow/Achilles    Edges: epibole    Drainage: copious/serosanguinous    Odor: no    Undermining: no    Bone Exposure: No    Clinical Signs of Infection: No    After obtaining patient consent, the wound was irrigated with copious amounts of saline.     Barriers to Healing: Erwin is a type 2 diabetic, but has controlled A1c. Wound is deep and more difficult to heal.  I did discuss with him that he should have her back on this.  He relates that he thinks that he might make him claustrophobic and he might take this off.  If he is noncompliant with a dressing, this is likely not going to heal.  He is fatalistic and is already planning on having his foot amputated.  He has been hard to get a hold of by home nursing in order to establish care at the beginning of when I saw him for this wound.    Treatment Plan: VAC.  He would like to have further treatment options for this foot and walking.  I related to him that he needs to speak to Dr. Bennett about this.  He told me that the foot is never going to be walk up again.  I told him that I did not know this, and it is possible that he could be braced for ambulation.  He does not agree with this.    Pt Ability to Follow Plan: Poor.    Assessment: Erwin is a 59 YO with right posterior heel " dehiscence s/p surgery in November.  I spent 65 minutes with Erwin and his care today.  I reviewed his previous notes.  I consulted with [facility] and with [facility] to make sure that his new wound orders were appropriately faxed.  I coordinated with [employer] to get him the ready VAC today. Documentation was included in this time.   I did perform wound care with him.  I had a significant amount of discussion with him today about the potential for this wound to heal.  I discussed with him that if he were to take off his wound VAC, which he thinks he might have to do, the wound is likely not going to heal.  I discussed with him the prognosis for getting this wound healed.  He would like to know more information about future care for this foot.  I related to him that I was most concerned about getting his wound healed in order to prevent infection.  I have asked him to make an appointment with Dr. Fritz in order to discuss future mechanics of this foot.  We did offer him an appointment for this coming Tuesday morning, however he refused.  Our staff tried to get him back onto my schedule for 2 weeks, however he refused to go back onto the schedule.  Unfortunately because of this, if he calls in for an appointment, it may be a wait for him.  We would like him to continue home care if this is the case.  At this juncture, it is unclear whether he will be following back up with this clinic.  Unfortunately, he has refused to make a follow-up with us while here today.  I did discuss with him today that the clinic staff is trying to be very attentive to him and his needs.    Plan:   - Pt seen and evaluated  -As noted above, a VAC was ordered for him.  I did send a [facility] wound orders to [facility].  I discussed with him the VAC, and he was at first not willing to have it placed.  I asked him why, and he relates that it could make him feel claustrophobic.  I related to him that he did not know what  that I felt like and I would recommend that he try it first.  I let him know that he could take it off if he really needed to, however the wound would not heal.  It was placed today, and I hope that he is able to keep this on.  The VAC will be changed Monday, Wednesday, & Friday by home nursing.  -As noted above both appointments for Dr. Fritz and myself were offered.  He refused both of these.   -I discussed with him in detail today the risks of noncompliance and not having this wound healed.  This could include infection, and loss of limb.  It could also lead to something such as sepsis and death.  I related to him that this is the primary focus of our visit today was to get the wound plan in place.  He wanted more information about what Dr. Fritz would like to do, however I told him he needed to make an appointment Lam for this.  - Pt was to return to clinic in 14 days for VAC. He can call to schedule appt.

## 2021-01-08 NOTE — PROGRESS NOTES
Clinic Care Coordination Contact    Follow Up Progress Note   11/23/2020  Post op problem   Bone spur removal from achilles tendon on 11/11/20. Here for drainage noted to posterior ankle cast site. Also, pt complains of nausea and inability to keep down solid food.     Assessment:   PCP requests CC RN's assistance for home care services and dressing supplies      Assessment: Patient reports the wound dressing supplies arrived yesterday .  PatienthHad a home care RN visit ([facility] ([phone]) Saturday-Tuesday and yesterday.  Patient is at Dr Anaya/Orthopedic  office visit right now and is getting a wound vac applied to his foot.     Goals addressed this encounter:   Goals Addressed                 This Visit's Progress      Home care (pt-stated)   70%     Goal Statement: I would like getting help with home care, wound care in the next month.   Date Goal set: 12/28/2020  Barriers: lack of openings for home care   Strengths: asking for help   Date to Achieve By: 1/28/2021  Patient expressed understanding of goal: yes   Action steps to achieve this goal:  1. I will wait for another home care to call out to me.   2. I will talk with my orthopedic clinic            Medical (pt-stated)   70%     Goal Statement: I would like help getting wound dressing supplies in the next month.   Date Goal set: 12/28/2020  Barriers: transportation concerns.   Strengths: advocating for self   Date to Achieve By: 1/28/2021  Patient expressed understanding of goal: yes  Action steps to achieve this goal:  1. I will wait to see if [employer] delivers any supplies.   2. I will contact CC if I am in need of getting further help                  Intervention/Education provided during outreach:Not discussed today do to he is at a provider visit      Outreach Frequency: 2 weeks    Plan:   Patient agrees to a CC RN follow up in 3-5 business days to get an update on his progress     [facility]   Echo Varghese RN, Care  Coordinator   [facility] and [place]   E-mail [email or web address]   [phone]

## 2021-01-08 NOTE — TELEPHONE ENCOUNTER
Spoke with patient yesterday to offer him an appointment with Dr. Anaya for today(1/8/21). Patent accepted an appointment at 8:40am.     NEO [place] NREMT

## 2021-01-08 NOTE — LETTER
1/8/2021         RE: Erwin Aguilar  [address]  [place] [address]        Dear Colleague,    Thank you for referring your patient, Erwin Aguilar, to the [facility] [place]. Please see a copy of my visit note below.    Chief Complaint:   Chief Complaint   Patient presents with     RECHECK     wound check follow up.           Allergies   Allergen Reactions     Levaquin Anaphylaxis and Rash     Swelling in lip and tongue felt thick     Lisinopril Anaphylaxis     Swollen tongue; inability to swallow; drooling; hives; swollen face, neck, angioedema     Acetaminophen      Hx of cirrhosis      Amlodipine      Swelling, hives, possible angioedema       Morphine      b/p dropped and arms went numb  Hypotension     Quinolones Hives     Bactrim [Sulfamethoxazole W/Trimethoprim] Rash         Subjective: Erwin is a 60 year old male who presents to the clinic today for a follow up of right Achilles ulceration. He recently had a right Achilles debridement and partial calc excision. He now has a dehiscence at the incision site.  Since his last visit here, he has had contact with multiple staff members here in our clinic.  In the interim since his last visit, I did order a wound VAC for him.  He had issues getting home nursing to come to him.  We did work on getting home nursing.  At that time, the home nursing agency had let us know that they try to get in contact with him but could not.  I personally called senior home care today and he does have an order with them and they saw him on January 2.  All of the previous notes were reviewed today.  Today he relates that somebody has told him that he will never walk on this foot again.  He relates that he has had prior back surgeries that have complications.  He is quite upset and frustrated that he is not have this VAC placed.  The previous VAC that was ordered was sent back to [facility].  He relates that he wants answers about what is going to happen  "to his foot in the future.  He relates that he has a neurosurgeon friend in [place] who has told him that his back issues are impeding his wound healing in his leg.  He relates that his brother was a diabetic and he saw his brother have multiple complications with his feet.  He relates that his brothers condition improved drastically when he had bilateral amputations.  He has not seen Dr. Fritz in some time.  He relates that he wants a plan about what is going to happen with his weightbearing status of this leg.  He relates that he is resigned that he might need an amputation of this leg.  This has not been a consideration here in our clinic. He relaets that he has had other opinions on this leg.     Objective  Data Unavailable Data Unavailable Data Unavailable Data Unavailable 6' 0\" 208 lbs 0 oz            A dehiscence wound is noted at right  posterior heel measuring 2.9cm x 1.6cm x 2cm.    Mix Classification: 2    Wound base: Yellow/Achilles    Edges: epibole    Drainage: copious/serosanguinous    Odor: no    Undermining: no    Bone Exposure: No    Clinical Signs of Infection: No    After obtaining patient consent, the wound was irrigated with copious amounts of saline.     Barriers to Healing: Erwin is a type 2 diabetic, but has controlled A1c. Wound is deep and more difficult to heal.  I did discuss with him that he should have her back on this.  He relates that he thinks that he might make him claustrophobic and he might take this off.  If he is noncompliant with a dressing, this is likely not going to heal.  He is fatalistic and is already planning on having his foot amputated.  He has been hard to get a hold of by home nursing in order to establish care at the beginning of when I saw him for this wound.    Treatment Plan: VAC.  He would like to have further treatment options for this foot and walking.  I related to him that he needs to speak to Dr. Bennett about this.  He told me that the foot is never " going to be walk up again.  I told him that I did not know this, and it is possible that he could be braced for ambulation.  He does not agree with this.    Pt Ability to Follow Plan: Poor.    Assessment: Erwin is a 59 YO with right posterior heel dehiscence s/p surgery in November.  I spent 65 minutes with Erwin and his care today.  I reviewed his previous notes.  I consulted with both [facility] and with [facility] care to make sure that his new wound orders were appropriately faxed.  I coordinated with [facility] to get him the ready VAC today. Documentation was included in this time.   I did perform wound care with him.  I had a significant amount of discussion with him today about the potential for this wound to heal.  I discussed with him that if he were to take off his wound VAC, which he thinks he might have to do, the wound is likely not going to heal.  I discussed with him the prognosis for getting this wound healed.  He would like to know more information about future care for this foot.  I related to him that I was most concerned about getting his wound healed in order to prevent infection.  I have asked him to make an appointment with Dr. Fritz in order to discuss future mechanics of this foot.  We did offer him an appointment for this coming Tuesday morning, however he refused.  Our staff tried to get him back onto my schedule for 2 weeks, however he refused to go back onto the schedule.  Unfortunately because of this, if he calls in for an appointment, it may be a wait for him.  We would like him to continue home care if this is the case.  At this juncture, it is unclear whether he will be following back up with this clinic.  Unfortunately, he has refused to make a follow-up with us while here today.  I did discuss with him today that the clinic staff is trying to be very attentive to him and his needs.    Plan:   - Pt seen and evaluated  -As noted above, a VAC was ordered for him.  I did  send a new wound orders to [facility].  I discussed with him the VAC, and he was at first not willing to have it placed.  I asked him why, and he relates that it could make him feel claustrophobic.  I related to him that he did not know what that I felt like and I would recommend that he try it first.  I let him know that he could take it off if he really needed to, however the wound would not heal.  It was placed today, and I hope that he is able to keep this on.  The VAC will be changed Monday, Wednesday, & Friday by home nursing.  -As noted above both appointments for Dr. Fritz and myself were offered.  He refused both of these.   -I discussed with him in detail today the risks of noncompliance and not having this wound healed.  This could include infection, and loss of limb.  It could also lead to something such as sepsis and death.  I related to him that this is the primary focus of our visit today was to get the wound plan in place.  He wanted more information about what Dr. Fritz would like to do, however I told him he needed to make an appointment [facility] for this.  - Pt was to return to clinic in 14 days for VAC. He can call to schedule appt.         Victor M Anaya DPM

## 2021-01-08 NOTE — TELEPHONE ENCOUNTER
Reason for Call:  Form, our goal is to have forms completed with 72 hours, however, some forms may require a visit or additional information.    Type of letter, form or note:  order medication alert/plan of care    Who is the form from?: [facility] (if other please explain)    Where did the form come from: interoffice    What clinic location was the form placed at?: [facility]    Where the form was placed: Given to physician    What number is listed as a contact on the form?:   [phone] fax [phone]       Additional comments:     Call taken on 1/8/2021 at 2:34 PM by Richard Alcantar

## 2021-01-10 ENCOUNTER — HEALTH MAINTENANCE LETTER (OUTPATIENT)
Age: 61
End: 2021-01-10

## 2021-01-11 ENCOUNTER — MYC MEDICAL ADVICE (OUTPATIENT)
Dept: FAMILY MEDICINE | Facility: CLINIC | Age: 61
End: 2021-01-11

## 2021-01-11 ENCOUNTER — TELEPHONE (OUTPATIENT)
Dept: FAMILY MEDICINE | Facility: CLINIC | Age: 61
End: 2021-01-11

## 2021-01-11 DIAGNOSIS — M79.671 PAIN OF RIGHT HEEL: ICD-10-CM

## 2021-01-11 DIAGNOSIS — Z98.890 H/O FOOT SURGERY: Primary | ICD-10-CM

## 2021-01-11 RX ORDER — OXYCODONE HYDROCHLORIDE 5 MG/1
5 TABLET ORAL 2 TIMES DAILY
Qty: 60 TABLET | Refills: 0 | Status: SHIPPED | OUTPATIENT
Start: 2021-01-11 | End: 2021-01-14

## 2021-01-11 NOTE — TELEPHONE ENCOUNTER
Reason for Call:  Form, our goal is to have forms completed with 72 hours, however, some forms may require a visit or additional information.    Type of letter, form or note:  physician order    Who is the form from?: [facility]     Where did the form come from: form was faxed in    What clinic location was the form placed at?: [facility]    Where the form was placed: Given to physician    What number is listed as a contact on the form?:  [phone] fax [phone]       Additional comments:     Call taken on 1/11/2021 at 8:56 AM by Richard Alcantar

## 2021-01-11 NOTE — TELEPHONE ENCOUNTER
JW,    Please see [employer] messages below    Basaglar refill sent in today.  Roxicodone is not on current medication list.  Isela Villar RN

## 2021-01-13 ENCOUNTER — MYC MEDICAL ADVICE (OUTPATIENT)
Dept: FAMILY MEDICINE | Facility: CLINIC | Age: 61
End: 2021-01-13

## 2021-01-13 DIAGNOSIS — G89.29 OTHER CHRONIC PAIN: Primary | ICD-10-CM

## 2021-01-13 DIAGNOSIS — Z98.890 H/O FOOT SURGERY: ICD-10-CM

## 2021-01-13 DIAGNOSIS — M79.671 PAIN OF RIGHT HEEL: ICD-10-CM

## 2021-01-13 NOTE — TELEPHONE ENCOUNTER
Team please start prior auth and route to prior auth team.  We may need to get the original prior auth notice from his pharmacy.     Joel Wegener,MD

## 2021-01-13 NOTE — TELEPHONE ENCOUNTER
JW,  Please see below [employer] message and advise.  Pharmacy filled 7 days since acute pain and not chronic   Do you want change dx on Rx and see if 30 day ok then?  Thanks,  Randi BARRIOS RN

## 2021-01-14 RX ORDER — OXYCODONE HYDROCHLORIDE 5 MG/1
5 TABLET ORAL 2 TIMES DAILY
Qty: 60 TABLET | Refills: 0 | Status: SHIPPED | OUTPATIENT
Start: 2021-01-14 | End: 2021-03-01

## 2021-01-14 NOTE — TELEPHONE ENCOUNTER
Spoke with [facility], who states Oxycodone does not need a PA but they could only dispense a 7 day supply because  the rx did not indicate if this was for chronic or acute pain.  Pt is new to pharmacy and they don't have a usage history on him.  Pharmacy will need another rx.  Ph# [phone].

## 2021-01-15 NOTE — TELEPHONE ENCOUNTER
Spoke with patient and informed him of the updated Rx.    Betty Harding [facility]

## 2021-01-17 ENCOUNTER — MYC MEDICAL ADVICE (OUTPATIENT)
Dept: FAMILY MEDICINE | Facility: CLINIC | Age: 61
End: 2021-01-17

## 2021-01-18 ENCOUNTER — TELEPHONE (OUTPATIENT)
Dept: FAMILY MEDICINE | Facility: CLINIC | Age: 61
End: 2021-01-18

## 2021-01-18 DIAGNOSIS — G89.29 OTHER CHRONIC PAIN: ICD-10-CM

## 2021-01-18 DIAGNOSIS — Z98.890 H/O FOOT SURGERY: ICD-10-CM

## 2021-01-18 DIAGNOSIS — M79.671 PAIN OF RIGHT HEEL: ICD-10-CM

## 2021-01-18 NOTE — TELEPHONE ENCOUNTER
Chronic pain.  I had re-sent prescription with this diagnosis attached as well.  Let me know if they need me to re-send again.     Joel Wegener,MD

## 2021-01-18 NOTE — TELEPHONE ENCOUNTER
FLORINDA,    [facility] called    Need to know if Oxycodone is for acute or chronic pain?    Thanks,    Isela Villar RN      Triage note:  Pharmacy phone number: [phone]., press 0.

## 2021-01-19 ENCOUNTER — OFFICE VISIT (OUTPATIENT)
Dept: ORTHOPEDICS | Facility: CLINIC | Age: 61
End: 2021-01-19
Payer: MEDICARE

## 2021-01-19 VITALS — BODY MASS INDEX: 28.17 KG/M2 | WEIGHT: 208 LBS | HEIGHT: 72 IN

## 2021-01-19 DIAGNOSIS — M25.571 PAIN IN JOINT, ANKLE AND FOOT, RIGHT: Primary | ICD-10-CM

## 2021-01-19 PROCEDURE — 99024 POSTOP FOLLOW-UP VISIT: CPT | Performed by: ORTHOPAEDIC SURGERY

## 2021-01-19 ASSESSMENT — MIFFLIN-ST. JEOR: SCORE: 1786.48

## 2021-01-19 NOTE — PROGRESS NOTES
CHIEF COMPLAINT:  Status post right Achilles tendon debridement with partial calcaneus excision and subsequent reattachment performed on 11/11/2020.      HISTORY OF PRESENT ILLNESS:  Mr. Aguilar presents today for further followup.  Reports to be doing well.  Presents today in very good spirits and very calm.      PHYSICAL EXAMINATION:  On today's visit, he presents with a wound that is clearly making progress.  Presents approximately at 7 mm x 2 cm x 1 cm.  There is no odor.  There are no signs of active infection.      ASSESSMENT:   1.  Status post right Achilles tendon debridement with partial calcaneus excision and subsequent reattachment.   2.  Right wound dehiscence.      PLAN:  I discussed with the patient that at this point I would like him to continue working under the direction and guidance of Dr. Anaya.      Once the wound is completely healed, he will return to our clinic for further management and rehab of his Achilles tendon.  In the meantime, he will avoid any physical therapy or weightbearing on the left heel until the wound is completely healed.      All questions were answered.  The patient was pleased with the discussion.

## 2021-01-19 NOTE — LETTER
1/19/2021         RE: Erwin Aguilar  [address]  [place] [address]        Dear Colleague,    Thank you for referring your patient, Erwin Aguilar, to the [facility] [place]. Please see a copy of my visit note below.    CHIEF COMPLAINT:  Status post right Achilles tendon debridement with partial calcaneus excision and subsequent reattachment performed on 11/11/2020.      HISTORY OF PRESENT ILLNESS:  Mr. Aguilar presents today for further followup.  Reports to be doing well.  Presents today in very good spirits and very calm.      PHYSICAL EXAMINATION:  On today's visit, he presents with a wound that is clearly making progress.  Presents approximately at 7 mm x 2 cm x 1 cm.  There is no odor.  There are no signs of active infection.      ASSESSMENT:   1.  Status post right Achilles tendon debridement with partial calcaneus excision and subsequent reattachment.   2.  Right wound dehiscence.      PLAN:  I discussed with the patient that at this point I would like him to continue working under the direction and guidance of Dr. Anaya.      Once the wound is completely healed, he will return to our clinic for further management and rehab of his Achilles tendon.  In the meantime, he will avoid any physical therapy or weightbearing on the left heel until the wound is completely healed.      All questions were answered.  The patient was pleased with the discussion.       Eh Sandoval MD

## 2021-01-19 NOTE — NURSING NOTE
Reason For Visit:   Chief Complaint   Patient presents with     RECHECK     Discuss right ankle patient stated he is feeling better and his wound looks like its getting better stated he sent a picture in [employer] yesterday.        Ht 1.829 m (6')   Wt 94.3 kg (208 lb)   BMI 28.21 kg/m           Faisal Andre ATC

## 2021-01-21 ENCOUNTER — PATIENT OUTREACH (OUTPATIENT)
Dept: CARE COORDINATION | Facility: CLINIC | Age: 61
End: 2021-01-21

## 2021-01-21 ENCOUNTER — PATIENT OUTREACH (OUTPATIENT)
Dept: NURSING | Facility: CLINIC | Age: 61
End: 2021-01-21
Payer: MEDICARE

## 2021-01-21 DIAGNOSIS — S91.301A OPEN WOUND OF RIGHT HEEL: Primary | ICD-10-CM

## 2021-01-21 ASSESSMENT — ACTIVITIES OF DAILY LIVING (ADL): DEPENDENT_IADLS:: TRANSPORTATION

## 2021-01-21 NOTE — PROGRESS NOTES
Clinic Care Coordination Contact    Follow Up Progress Note      Assessment: Patient has his dressing changed 3 times a week by home care RN  The heel is slowly healing .  Patient will see Dr Sandoval in the future to discuss possible tendon surgery.  Patient has a future appointment with PING Foster and Dr Anaya /Podiatrist 1/28 /2021  Goals addressed this encounter:   Goals Addressed                 This Visit's Progress      COMPLETED: Home care (pt-stated)   90%     Goal Statement: I would like getting help with home care, wound care in the next month.   Date Goal set: 12/28/2020  Barriers: lack of openings for home care   Strengths: asking for help   Date to Achieve By: 1/28/2021  Patient expressed understanding of goal: yes   Action steps to achieve this goal:  1. I will wait for another home care to call out to me.   2. I will talk with my orthopedic clinic            COMPLETED: Medical (pt-stated)   90%     Goal Statement: I would like help getting wound dressing supplies in the next month.   Date Goal set: 12/28/2020  Barriers: transportation concerns.   Strengths: advocating for self   Date to Achieve By: 1/28/2021  Patient expressed understanding of goal: yes  Action steps to achieve this goal:  1. I will wait to see if [employer] delivers any supplies.   2. I will contact CC if I am in need of getting further help             Medical (pt-stated)   50%     Goal Statement: I will keep [facility] home care home visits to change heel dressing to promote healing   Date Goal set: 1/21/2021  Barriers: slow healing open wound  Strengths: Positive attitude   Date to Achieve By: 2.21.2021  Patient expressed understanding of goal: Yes  Action steps to achieve this goal:  1. I will future appointments with Dr Sandoval and Dr Anaya /Podiatrist /Surgeon   2. I will keep wound dressing home care appointment 3 times a week  3. Care coordinator will follow up in 2-4 weeks              Intervention/Education provided  during outreach: CC available for questions or concerns      Outreach Frequency: 2 weeks    Plan:   Patient will keep future home care visit,Dr Sandoval/surgeon,PING Foster   and Dr Anaya Podiatrist 1/28/2021  CC will follow up in 1-2 weeks     [facility]   Echo Varghese RN, Care Coordinator   [facility] and [place]   E-mail [email or web address]   [phone]

## 2021-01-22 NOTE — PROGRESS NOTES
Clinic Care Coordination Contact  [facility]/Voicemail       Clinical Data: Care Coordinator Outreach  Outreach attempted x 1.  Left message on patient's voicemail with call back information and requested return call.  Plan: . Care Coordinator will try to reach patient again in 3-5 business days.  [facility]   Echo Varghese RN, Care Coordinator   [facility] and [place]   E-mail [email or web address]   [phone]

## 2021-01-26 ENCOUNTER — VIRTUAL VISIT (OUTPATIENT)
Dept: GASTROENTEROLOGY | Facility: CLINIC | Age: 61
End: 2021-01-26
Attending: INTERNAL MEDICINE
Payer: MEDICARE

## 2021-01-26 DIAGNOSIS — K70.30 ALCOHOLIC CIRRHOSIS OF LIVER WITHOUT ASCITES (H): Primary | ICD-10-CM

## 2021-01-26 PROCEDURE — 99442 PR PHYSICIAN TELEPHONE EVALUATION 11-20 MIN: CPT | Mod: 95 | Performed by: INTERNAL MEDICINE

## 2021-01-26 ASSESSMENT — PAIN SCALES - GENERAL: PAINLEVEL: EXTREME PAIN (8)

## 2021-01-26 NOTE — PROGRESS NOTES
Erwin is a 60 year old who is being evaluated via a billable telephone visit.      What phone number would you like to be contacted at? [phone]  How would you like to obtain your AVS? Mail a copy  Phone call duration: 18 minutes    [facility]    Hepatology follow-up    CHIEF COMPLAINT AND REASON FOR VISIT:  Alcoholic cirrhosis.      SUBJECTIVE:  Mr. Aguilar is a 60-year-old male which we have followed for alcoholic cirrhosis.  He did present initially with hematemesis and melena, and in fact, he was diagnosed with esophageal variceal bleed and got banded.  At that time, he did abstain from alcoholic beverages and he improved clinically.  Hence, he is now almost 6 years sober.  His MELD score dropped and he is very low now.  He also has normal liver function tests.      Today he is telling me that he has minimal edema.  He might have lost 10 pounds.  No abdominal pain, nausea or vomiting.  He has also not jaundice.  Denies any lethargy, confusion or memory issues and did not have any gastrointestinal bleeding like melena, hematemesis or hematochezia.       Mr. Aguilar had recently a right Achilles tendon debridement with partial calcaneus excision and  reattachment. He  is currently followed by the [facility] and by Dr. Sandoval from orthopedics.     Medical hx Surgical hx   Past Medical History:   Diagnosis Date     Actinic keratosis     aldara     Anxiety      Cancer (H)     squamous cell skin CA     Cauda equina spinal cord injury (H)      Chronic sinusitis 5-1-16     Depressive disorder      Diastasis recti      Esophageal reflux      Esophageal varices in cirrhosis (H) 4/1/2014    Hospitalized for UGI blee 3/28/14, endoscopy revealed bleeding varices.     Essential hypertension, benign      Intermittent asthma      Mild depression (H)      Mixed hyperlipidemia      Nasal polyps 5-1-16     Other chronic pain      SCCA (squamous cell carcinoma) of skin      Seasonal  allergic conjunctivitis      Type II or unspecified type diabetes mellitus without mention of complication, not stated as uncontrolled      Unspecified site of spinal cord injury without evidence of spinal bone injury     due to back surgery      Past Surgical History:   Procedure Laterality Date     BACK SURGERY  August 2009     C APPENDECTOMY  1974     COLONOSCOPY N/A 5/12/2016    Procedure: COMBINED COLONOSCOPY, SINGLE OR MULTIPLE BIOPSY/POLYPECTOMY BY BIOPSY;  Surgeon: Ana Paula Urbina MD;  Location:  GI     ENDOSCOPY UPPER, COLONOSCOPY, COMBINED  10/19/2011    Procedure:COMBINED ENDOSCOPY UPPER, COLONOSCOPY; Upper Endoscopy, Colonoscopy with Polypectomy x4; Surgeon:AMBAR RODRÍGUEZIQ; [facility]     ENT SURGERY  1-2016    Ongoing since then     ESOPHAGOSCOPY, GASTROSCOPY, DUODENOSCOPY (EGD), COMBINED  3/28/2014    Procedure: COMBINED ESOPHAGOSCOPY, GASTROSCOPY, DUODENOSCOPY (EGD);  EGD, Gastric Biopsies, Esophageal Banding;  Surgeon: Reyna Tovar MD;  Location:  OR     ESOPHAGOSCOPY, GASTROSCOPY, DUODENOSCOPY (EGD), COMBINED  6/9/2014    Procedure: COMBINED ESOPHAGOSCOPY, GASTROSCOPY, DUODENOSCOPY (EGD);  Surgeon: Curtis Mendez MD;  Location:  GI     ESOPHAGOSCOPY, GASTROSCOPY, DUODENOSCOPY (EGD), COMBINED  7/24/2014    Procedure: COMBINED ESOPHAGOSCOPY, GASTROSCOPY, DUODENOSCOPY (EGD);  Surgeon: Gerard Samaniego MD;  Location:  OR     ESOPHAGOSCOPY, GASTROSCOPY, DUODENOSCOPY (EGD), COMBINED N/A 10/31/2014    Procedure: COMBINED ESOPHAGOSCOPY, GASTROSCOPY, DUODENOSCOPY (EGD);  Surgeon: Gerard Samaniego MD;  [facility]     ESOPHAGOSCOPY, GASTROSCOPY, DUODENOSCOPY (EGD), COMBINED N/A 5/12/2016    Procedure: COMBINED ESOPHAGOSCOPY, GASTROSCOPY, DUODENOSCOPY (EGD);  Surgeon: Ana Paula Urbina MD;  [facility] GI     ESOPHAGOSCOPY, GASTROSCOPY, DUODENOSCOPY (EGD), COMBINED N/A 8/2/2018    Procedure: COMBINED ESOPHAGOSCOPY, GASTROSCOPY, DUODENOSCOPY (EGD);   EGD;  Surgeon: Yu Wagner MD;  [facility] GI     HCL SQUAMOUS CELL CARCINOMA AG  10/07    left forearm     HERNIORRHAPHY UMBILICAL  11/8/2012    Procedure: HERNIORRHAPHY UMBILICAL;  Open Umbilical Hernia Repair With Mesh ;  Surgeon: Chase Nicholson MD;  [facility]     INSERT STIMULATOR DORSAL COLUMN N/A 6/28/2018    Procedure: INSERT STIMULATOR DORSAL COLUMN;;  Surgeon: Elvis Sauceda MD;  [facility]     neuro stimulator  2010     REMOVE GENERATOR STIMULATOR (LOCATION) N/A 6/28/2018    Procedure: REMOVE GENERATOR STIMULATOR (LOCATION);  Spinal Cord Stimulator and IPG Explant and Re-Implant of SCS System (Leads and IPG);  Surgeon: Elvis Sauceda MD;  [facility]     REPAIR TENDON ACHILLES Right 11/11/2020    Procedure: Right achilles debridement and partial calcaneus excision;  Surgeon: Eh Sandoval MD;  [place] [facility] [place]     SURGICAL HISTORY OF -   1/02    abcess tooth     SURGICAL HISTORY OF -   1999    L4-5 laminectomy, cauda equina syndrome     SURGICAL HISTORY OF -   +    herniated disk repair     TONSILLECTOMY  10 1964     TRANSPOSITION ULNAR NERVE (ELBOW)      right          Medications  Prior to Admission medications    Medication Sig Start Date End Date Taking? Authorizing Provider   ACE/ARB NOT PRESCRIBED, INTENTIONAL, ACE & ARB not prescribed due to Allergy 8/2/12  Yes Ksenia Bean APRN CNP   albuterol (PROAIR HFA/PROVENTIL HFA/VENTOLIN HFA) 108 (90 Base) MCG/ACT inhaler INHALE 2 PUFFS BY MOUTH EVERY 6 HOURS AS NEEDED FOR SHORTNESS OF BREATH/DYSPNEA OR WHEEZING 6/16/20  Yes Wegener, Joel Daniel Irwin, MD   ammonium lactate (LAC-HYDRIN) 12 % cream Apply topically 2 times daily as needed for dry skin 11/1/17  Yes Victor M Anaya DPM   baclofen (LIORESAL) 10 MG tablet TAKE 2 TABLETS BY MOUTH THREE TIMES DAILY 5/26/20  Yes Wegener, Joel Daniel Irwin, MD   blood glucose (ACCU-CHEK KARISHMA PLUS) test strip  USE TO TEST BLOOD SUGARS ONCE DAILY 9/5/19  Yes Wegener, Joel Daniel Irwin, MD   hydrochlorothiazide (HYDRODIURIL) 25 MG tablet TAKE 1 TABLET (25 MG) BY MOUTH DAILY 5/27/20  Yes Wegener, Joel Daniel Irwin, MD   insulin glargine (BASAGLAR KWIKPEN) 100 UNIT/ML pen Inject 26 Units Subcutaneous daily 1/11/21  Yes Wegener, Joel Daniel Irwin, MD   insulin pen needle (BD ROSE U/F) 32G X 4 MM miscellaneous Use daily and as directed. 10/19/20  Yes Wegener, Joel Daniel Irwin, MD   oxyCODONE (ROXICODONE) 5 MG tablet Take 1 tablet (5 mg) by mouth 2 times daily 1/14/21  Yes Wegener, Joel Daniel Irwin, MD   potassium chloride ER (K-TAB) 20 MEQ CR tablet TAKE 1 TABLET (20 MEQ) BY MOUTH DAILY 6/16/20  Yes Wegener, Joel Daniel Irwin, MD   promethazine (PHENERGAN) 25 MG tablet TAKE 1 TABLET (25MG) BY MOUTH THREE TIMES DAILY AS NEEDED FOR NAUSEA 12/30/20  Yes Wegener, Joel Daniel Irwin, MD   propranolol (INDERAL) 40 MG tablet TAKE 2 TABLETS (80 MG) BY MOUTH 2 TIMES DAILY 8/31/20  Yes Wegener, Joel Daniel Irwin, MD   rosuvastatin (CRESTOR) 10 MG tablet TAKE 1 TABLET BY MOUTH EVERY DAY 7/29/20  Yes Wegener, Joel Daniel Irwin, MD   rosuvastatin (CRESTOR) 20 MG tablet TAKE 1 TABLET BY MOUTH EVERY DAY 8/19/20  Yes Wegener, Joel Daniel Irwin, MD   sertraline (ZOLOFT) 100 MG tablet TAKE 2 TABLETS BY MOUTH EVERY DAY 10/8/19  Yes Wegener, Joel Daniel Irwin, MD       Allergies  Allergies   Allergen Reactions     Levaquin Anaphylaxis and Rash     Swelling in lip and tongue felt thick     Lisinopril Anaphylaxis     Swollen tongue; inability to swallow; drooling; hives; swollen face, neck, angioedema     Acetaminophen      Hx of cirrhosis      Amlodipine      Swelling, hives, possible angioedema       Morphine      b/p dropped and arms went numb  Hypotension     Quinolones Hives     Bactrim [Sulfamethoxazole W/Trimethoprim] Rash       Review of systems  A 10-point review of systems was negative    Examination  There were no vitals taken for this  visit.  There is no height or weight on file to calculate BMI.    Gen- well, NAD, A+Ox3, normal color.  Psych- normal mood    Laboratory  Lab Results   Component Value Date     11/23/2020    POTASSIUM 3.6 11/23/2020    CHLORIDE 109 11/23/2020    CO2 25 11/23/2020    BUN 9 11/23/2020    CR 0.65 11/23/2020       Lab Results   Component Value Date    BILITOTAL 0.8 11/23/2020    ALT 24 11/23/2020    AST 14 11/23/2020    ALKPHOS 109 11/23/2020       Lab Results   Component Value Date    ALBUMIN 3.8 11/23/2020    PROTTOTAL 8.5 11/23/2020        Lab Results   Component Value Date    WBC 10.6 11/23/2020    HGB 14.3 11/23/2020    MCV 88 11/23/2020     11/23/2020       Lab Results   Component Value Date    INR 1.26 07/20/2020       Radiology    ASSESSMENT AND PLAN:  Alcoholic liver disease.  Mr. Aguilar has alcoholic liver disease.  He has abstained from alcoholic beverages and did improve quite a lot.  He had initial diagnosis esophageal varices that required banding, but later on repeated he did not have any esophageal varices, which leads that he might have had a significant alcoholic hepatitis on the top of advanced fibrosis.  Now, his platelets are normal and his APPRI score is:0.105 and no signs of decompensation all go against cirrhosis.  anyway  we will just continue doing surveillance for HCC and we congratulated him regarding abstaining from alcoholic beverages.  For his other medical issues, he will follow with his orthopedic surgeon and his PCP, and we will see him here in a year.       Kimberly Ramirez MD  Hepatology  [facility]

## 2021-01-26 NOTE — LETTER
1/26/2021         RE: Erwin Aguilar  [address]  [place] [address]        Dear Colleague,    Thank you for referring your patient, Erwin Aguilar, to the [facility]PATOLOG[facility] [place]. Please see a copy of my visit note below.        Erwin is a 60 year old who is being evaluated via a billable telephone visit.      What phone number would you like to be contacted at? [phone]  How would you like to obtain your AVS? Mail a copy  Phone call duration: 18 minutes    [facility]    Hepatology follow-up    CHIEF COMPLAINT AND REASON FOR VISIT:  Alcoholic cirrhosis.      SUBJECTIVE:  Mr. Aguilar is a 60-year-old male which we have followed for alcoholic cirrhosis.  He did present initially with hematemesis and melena, and in fact, he was diagnosed with esophageal variceal bleed and got banded.  At that time, he did abstain from alcoholic beverages and he improved clinically.  Hence, he is now almost 6 years sober.  His MELD score dropped and he is very low now.  He also has normal liver function tests.      Today he is telling me that he has minimal edema.  He might have lost 10 pounds.  No abdominal pain, nausea or vomiting.  He has also not jaundice.  Denies any lethargy, confusion or memory issues and did not have any gastrointestinal bleeding like melena, hematemesis or hematochezia.       Mr. Aguilar had recently a right Achilles tendon debridement with partial calcaneus excision and  reattachment. He  is currently followed by the [facility]ound Clinic group and by Dr. Sandoval from orthopedics.     Medical hx Surgical hx   Past Medical History:   Diagnosis Date     Actinic keratosis     aldara     Anxiety      Cancer (H)     squamous cell skin CA     Cauda equina spinal cord injury (H)      Chronic sinusitis 5-1-16     Depressive disorder      Diastasis recti      Esophageal reflux      Esophageal varices in cirrhosis (H) 4/1/2014    Hospitalized for UGI blee  3/28/14, endoscopy revealed bleeding varices.     Essential hypertension, benign      Intermittent asthma      Mild depression (H)      Mixed hyperlipidemia      Nasal polyps 5-1-16     Other chronic pain      SCCA (squamous cell carcinoma) of skin      Seasonal allergic conjunctivitis      Type II or unspecified type diabetes mellitus without mention of complication, not stated as uncontrolled      Unspecified site of spinal cord injury without evidence of spinal bone injury     due to back surgery      Past Surgical History:   Procedure Laterality Date     BACK SURGERY  August 2009     C APPENDECTOMY  1974     COLONOSCOPY N/A 5/12/2016    Procedure: COMBINED COLONOSCOPY, SINGLE OR MULTIPLE BIOPSY/POLYPECTOMY BY BIOPSY;  Surgeon: Ana Paula Urbina MD;  Location:  GI     ENDOSCOPY UPPER, COLONOSCOPY, COMBINED  10/19/2011    Procedure:COMBINED ENDOSCOPY UPPER, COLONOSCOPY; Upper Endoscopy, Colonoscopy with Polypectomy x4; Surgeon:AMBAR RODRÍGUEZ; Location: OR     ENT SURGERY  1-2016    Ongoing since then     ESOPHAGOSCOPY, GASTROSCOPY, DUODENOSCOPY (EGD), COMBINED  3/28/2014    Procedure: COMBINED ESOPHAGOSCOPY, GASTROSCOPY, DUODENOSCOPY (EGD);  EGD, Gastric Biopsies, Esophageal Banding;  Surgeon: Reyna Tovar MD;  Location:  OR     ESOPHAGOSCOPY, GASTROSCOPY, DUODENOSCOPY (EGD), COMBINED  6/9/2014    Procedure: COMBINED ESOPHAGOSCOPY, GASTROSCOPY, DUODENOSCOPY (EGD);  Surgeon: Curtis Mendez MD;  Location:  GI     ESOPHAGOSCOPY, GASTROSCOPY, DUODENOSCOPY (EGD), COMBINED  7/24/2014    Procedure: COMBINED ESOPHAGOSCOPY, GASTROSCOPY, DUODENOSCOPY (EGD);  Surgeon: Gerard Samaniego MD;  Location:  OR     ESOPHAGOSCOPY, GASTROSCOPY, DUODENOSCOPY (EGD), COMBINED N/A 10/31/2014    Procedure: COMBINED ESOPHAGOSCOPY, GASTROSCOPY, DUODENOSCOPY (EGD);  Surgeon: Gerard Samaniego MD;  Location:  OR     ESOPHAGOSCOPY, GASTROSCOPY, DUODENOSCOPY (EGD), COMBINED N/A 5/12/2016    Procedure:  COMBINED ESOPHAGOSCOPY, GASTROSCOPY, DUODENOSCOPY (EGD);  Surgeon: Ana Paula Urbina MD;  [facility]     ESOPHAGOSCOPY, GASTROSCOPY, DUODENOSCOPY (EGD), COMBINED N/A 8/2/2018    Procedure: COMBINED ESOPHAGOSCOPY, GASTROSCOPY, DUODENOSCOPY (EGD);  EGD;  Surgeon: Yu Wagner MD;  [facility] GI     HCL SQUAMOUS CELL CARCINOMA AG  10/07    left forearm     HERNIORRHAPHY UMBILICAL  11/8/2012    Procedure: HERNIORRHAPHY UMBILICAL;  Open Umbilical Hernia Repair With Mesh ;  Surgeon: Chase Nicholson MD;  [facility]     INSERT STIMULATOR DORSAL COLUMN N/A 6/28/2018    Procedure: INSERT STIMULATOR DORSAL COLUMN;;  Surgeon: Elvis Sauceda MD;  [facility]     neuro stimulator  2010     REMOVE GENERATOR STIMULATOR (LOCATION) N/A 6/28/2018    Procedure: REMOVE GENERATOR STIMULATOR (LOCATION);  Spinal Cord Stimulator and IPG Explant and Re-Implant of SCS System (Leads and IPG);  Surgeon: Elvis Sauceda MD;  [facility]     REPAIR TENDON ACHILLES Right 11/11/2020    Procedure: Right achilles debridement and partial calcaneus excision;  Surgeon: Eh Sandoval MD;  [place] [facility] [place]     SURGICAL HISTORY OF -   1/02    abcess tooth     SURGICAL HISTORY OF -   1999    L4-5 laminectomy, cauda equina syndrome     SURGICAL HISTORY OF -   +    herniated disk repair     TONSILLECTOMY  10 1964     TRANSPOSITION ULNAR NERVE (ELBOW)      right          Medications  Prior to Admission medications    Medication Sig Start Date End Date Taking? Authorizing Provider   ACE/ARB NOT PRESCRIBED, INTENTIONAL, ACE & ARB not prescribed due to Allergy 8/2/12  Yes Ksenia Bean APRN CNP   albuterol (PROAIR HFA/PROVENTIL HFA/VENTOLIN HFA) 108 (90 Base) MCG/ACT inhaler INHALE 2 PUFFS BY MOUTH EVERY 6 HOURS AS NEEDED FOR SHORTNESS OF BREATH/DYSPNEA OR WHEEZING 6/16/20  Yes Wegener, Joel Daniel Irwin, MD   ammonium lactate (LAC-HYDRIN) 12 % cream  Apply topically 2 times daily as needed for dry skin 11/1/17  Yes Victor M Anaya DPM   baclofen (LIORESAL) 10 MG tablet TAKE 2 TABLETS BY MOUTH THREE TIMES DAILY 5/26/20  Yes Wegener, Joel Daniel Irwin, MD   blood glucose (ACCU-CHEK KARISHMA PLUS) test strip USE TO TEST BLOOD SUGARS ONCE DAILY 9/5/19  Yes Wegener, Joel Daniel Irwin, MD   hydrochlorothiazide (HYDRODIURIL) 25 MG tablet TAKE 1 TABLET (25 MG) BY MOUTH DAILY 5/27/20  Yes Wegener, Joel Daniel Irwin, MD   insulin glargine (BASAGLAR KWIKPEN) 100 UNIT/ML pen Inject 26 Units Subcutaneous daily 1/11/21  Yes Wegener, Joel Daniel Irwin, MD   insulin pen needle (BD ROSE U/F) 32G X 4 MM miscellaneous Use daily and as directed. 10/19/20  Yes Wegener, Joel Daniel Irwin, MD   oxyCODONE (ROXICODONE) 5 MG tablet Take 1 tablet (5 mg) by mouth 2 times daily 1/14/21  Yes Wegener, Joel Daniel Irwin, MD   potassium chloride ER (K-TAB) 20 MEQ CR tablet TAKE 1 TABLET (20 MEQ) BY MOUTH DAILY 6/16/20  Yes Wegener, Joel Daniel Irwin, MD   promethazine (PHENERGAN) 25 MG tablet TAKE 1 TABLET (25MG) BY MOUTH THREE TIMES DAILY AS NEEDED FOR NAUSEA 12/30/20  Yes Wegener, Joel Daniel Irwin, MD   propranolol (INDERAL) 40 MG tablet TAKE 2 TABLETS (80 MG) BY MOUTH 2 TIMES DAILY 8/31/20  Yes Wegener, Joel Daniel Irwin, MD   rosuvastatin (CRESTOR) 10 MG tablet TAKE 1 TABLET BY MOUTH EVERY DAY 7/29/20  Yes Wegener, Joel Daniel Irwin, MD   rosuvastatin (CRESTOR) 20 MG tablet TAKE 1 TABLET BY MOUTH EVERY DAY 8/19/20  Yes Wegener, Joel Daniel Irwin, MD   sertraline (ZOLOFT) 100 MG tablet TAKE 2 TABLETS BY MOUTH EVERY DAY 10/8/19  Yes Wegener, Joel Daniel Irwin, MD       Allergies  Allergies   Allergen Reactions     Levaquin Anaphylaxis and Rash     Swelling in lip and tongue felt thick     Lisinopril Anaphylaxis     Swollen tongue; inability to swallow; drooling; hives; swollen face, neck, angioedema     Acetaminophen      Hx of cirrhosis      Amlodipine      Swelling, hives, possible  angioedema       Morphine      b/p dropped and arms went numb  Hypotension     Quinolones Hives     Bactrim [Sulfamethoxazole W/Trimethoprim] Rash       Review of systems  A 10-point review of systems was negative    Examination  There were no vitals taken for this visit.  There is no height or weight on file to calculate BMI.    Gen- well, NAD, A+Ox3, normal color.  Psych- normal mood    Laboratory  Lab Results   Component Value Date     11/23/2020    POTASSIUM 3.6 11/23/2020    CHLORIDE 109 11/23/2020    CO2 25 11/23/2020    BUN 9 11/23/2020    CR 0.65 11/23/2020       Lab Results   Component Value Date    BILITOTAL 0.8 11/23/2020    ALT 24 11/23/2020    AST 14 11/23/2020    ALKPHOS 109 11/23/2020       Lab Results   Component Value Date    ALBUMIN 3.8 11/23/2020    PROTTOTAL 8.5 11/23/2020        Lab Results   Component Value Date    WBC 10.6 11/23/2020    HGB 14.3 11/23/2020    MCV 88 11/23/2020     11/23/2020       Lab Results   Component Value Date    INR 1.26 07/20/2020       Radiology    ASSESSMENT AND PLAN:  Alcoholic liver disease.  Mr. Aguilar has alcoholic liver disease.  He has abstained from alcoholic beverages and did improve quite a lot.  He had initial diagnosis esophageal varices that required banding, but later on repeated he did not have any esophageal varices, which leads that he might have had a significant alcoholic hepatitis on the top of advanced fibrosis.  Now, his platelets are normal and his APPRI score is:0.105 and no signs of decompensation all go against cirrhosis.  anyway  we will just continue doing surveillance for HCC and we congratulated him regarding abstaining from alcoholic beverages.  For his other medical issues, he will follow with his orthopedic surgeon and his PCP, and we will see him here in a year.       Kimberly Ramirez MD  Hepatology  [facility]

## 2021-02-01 ENCOUNTER — TELEPHONE (OUTPATIENT)
Dept: WOUND CARE | Facility: CLINIC | Age: 61
End: 2021-02-01

## 2021-02-01 NOTE — TELEPHONE ENCOUNTER
Spoke with Hope to confirm wet to dry dressing are to continue. Patient is returning to clinic on 2/4/2021.    NEO Stroud NREMT

## 2021-02-01 NOTE — TELEPHONE ENCOUNTER
[facility] Call Center    Phone Message    May a detailed message be left on voicemail: yes     Reason for Call: Other: Hope is calling to let team know Pt took his wound vac off on Saturday and is not wanting to put it back on. She states Pt told her the tape was uncomfortable. She did a wet to dry dressing today and is wanting to know what else she should do for the Pt's wound. Please advise. Thank you!     Action Taken: Message routed to:  [facility] Wound     Travel Screening: Not Applicable

## 2021-02-04 ENCOUNTER — OFFICE VISIT (OUTPATIENT)
Dept: WOUND CARE | Facility: CLINIC | Age: 61
End: 2021-02-04
Payer: MEDICARE

## 2021-02-04 DIAGNOSIS — L97.312 SKIN ULCER OF RIGHT ANKLE WITH FAT LAYER EXPOSED (H): Primary | ICD-10-CM

## 2021-02-04 DIAGNOSIS — Z79.4 TYPE 2 DIABETES MELLITUS WITH DIABETIC POLYNEUROPATHY, WITH LONG-TERM CURRENT USE OF INSULIN (H): ICD-10-CM

## 2021-02-04 DIAGNOSIS — E11.42 TYPE 2 DIABETES MELLITUS WITH DIABETIC POLYNEUROPATHY, WITH LONG-TERM CURRENT USE OF INSULIN (H): ICD-10-CM

## 2021-02-04 PROCEDURE — 99024 POSTOP FOLLOW-UP VISIT: CPT | Performed by: PODIATRIST

## 2021-02-04 ASSESSMENT — PAIN SCALES - GENERAL: PAINLEVEL: EXTREME PAIN (8)

## 2021-02-04 NOTE — NURSING NOTE
Chief Complaint   Patient presents with     WOUND CARE     Pt here for wound care on right foot       There were no vitals filed for this visit.    There is no height or weight on file to calculate BMI.      NEO Stroud NREMT                    No vitals taken per provider

## 2021-02-04 NOTE — LETTER
2/4/2021       RE: Erwin Aguilar  [address]  [place] [address]     Dear Colleague,    Thank you for referring your patient, Erwin Aguilar, to the [facility] W[facility] [place] [facility]. Please see a copy of my visit note below.    Chief Complaint:   Chief Complaint   Patient presents with     WOUND CARE     Pt here for wound care on right foot       Allergies   Allergen Reactions     Levaquin Anaphylaxis and Rash     Swelling in lip and tongue felt thick     Lisinopril Anaphylaxis     Swollen tongue; inability to swallow; drooling; hives; swollen face, neck, angioedema     Acetaminophen      Hx of cirrhosis      Amlodipine      Swelling, hives, possible angioedema       Morphine      b/p dropped and arms went numb  Hypotension     Quinolones Hives     Bactrim [Sulfamethoxazole W/Trimethoprim] Rash       Subjective: Erwin is a 60 year old male who presents to the clinic today for a follow up of right Achilles ulceration. He was last seen a month ago. Relates that, for the most part, the wound has been vac'ed. Relates that the area around the wound was inflamed for a few days, and so the VAC was stopped for about a week. Relates that this helped with the pain.    Objective  Data Unavailable Data Unavailable Data Unavailable Data Unavailable Data Unavailable 0 lbs 0 oz            A dehiscence wound is noted at right  posterior heel measuring 2.7cm x 0.9cm x 1cm.    Mix Classification: 2    Wound base: Yellow/Achilles. [place] is loose in the wound.     Edges: epibole    Drainage: copious/serosanguinous    Odor: no    Undermining: no    Bone Exposure: No    Clinical Signs of Infection: No      After obtaining patient consent, the wound was irrigated with copious amounts of saline. A scalpel was then used to debride the wound into subcutaneous tissue. The wound edges were debrided back to healthy, bleeding tissue. The wound base exhibited healthy  bleeding. Given the patient's lack of sensation, no anesthesia was necessary for the procedure.      Barriers to Healing: Erwin is a type 2 diabetic, but has controlled A1c. Wound is deep and more difficult to heal.     Treatment Plan: VAC.      Pt Ability to Follow Plan: Reasonably good.    Assessment: Erwin is a 61 YO with right posterior heel dehiscence s/p surgery in November.  The wound has improved over the last month. He did have some redness to the area, but I query as to whether this was coming from the anchor working its way out of the calc. The anchor is providing no structural support today. I discussed this with him and discussed that the wound would likely not heal over the loose anchor. I recommend removing this in clinic and he agrees.     Plan:   - Pt seen and evaluated  - The anchor was cut from its suture attachment and removed from the wound.   - Wound was then debrided as described.   - VAC was then placed on the ulcer and was restarted at 125mm Hg. This can be changed tomorrow with home nursing. [place] to the wound margins.   - See again in 1 month.     Again, thank you for allowing me to participate in the care of your patient.      Sincerely,    Victor M Anaya DPM

## 2021-02-04 NOTE — PROGRESS NOTES
Chief Complaint:   Chief Complaint   Patient presents with     WOUND CARE     Pt here for wound care on right foot          Allergies   Allergen Reactions     Levaquin Anaphylaxis and Rash     Swelling in lip and tongue felt thick     Lisinopril Anaphylaxis     Swollen tongue; inability to swallow; drooling; hives; swollen face, neck, angioedema     Acetaminophen      Hx of cirrhosis      Amlodipine      Swelling, hives, possible angioedema       Morphine      b/p dropped and arms went numb  Hypotension     Quinolones Hives     Bactrim [Sulfamethoxazole W/Trimethoprim] Rash         Subjective: Erwin is a 60 year old male who presents to the clinic today for a follow up of right Achilles ulceration. He was last seen a month ago. Relates that, for the most part, the wound has been vac'ed. Relates that the area around the wound was inflamed for a few days, and so the VAC was stopped for about a week. Relates that this helped with the pain.    Objective  Data Unavailable Data Unavailable Data Unavailable Data Unavailable Data Unavailable 0 lbs 0 oz                    A dehiscence wound is noted at right  posterior heel measuring 2.7cm x 0.9cm x 1cm.    Mix Classification: 2    Wound base: Yellow/Achilles. [place] is loose in the wound.     Edges: epibole    Drainage: copious/serosanguinous    Odor: no    Undermining: no    Bone Exposure: No    Clinical Signs of Infection: No      After obtaining patient consent, the wound was irrigated with copious amounts of saline. A scalpel was then used to debride the wound into subcutaneous tissue. The wound edges were debrided back to healthy, bleeding tissue. The wound base exhibited healthy bleeding. Given the patient's lack of sensation, no anesthesia was necessary for the procedure.        Barriers to Healing: Erwin is a type 2 diabetic, but has controlled A1c. Wound is deep and more difficult to heal.     Treatment Plan: VAC.      Pt Ability to Follow Plan: Reasonably  good.    Assessment: Erwin is a 59 YO with right posterior heel dehiscence s/p surgery in November.  The wound has improved over the last month. He did have some redness to the area, but I query as to whether this was coming from the anchor working its way out of the calc. The anchor is providing no structural support today. I discussed this with him and discussed that the wound would likely not heal over the loose anchor. I recommend removing this in clinic and he agrees.     Plan:   - Pt seen and evaluated  - The anchor was cut from its suture attachment and removed from the wound.   - Wound was then debrided as described.   - VAC was then placed on the ulcer and was restarted at 125mm Hg. This can be changed tomorrow with home nursing. [place] to the wound margins.   - See again in 1 month.

## 2021-02-15 ENCOUNTER — PATIENT OUTREACH (OUTPATIENT)
Dept: CARE COORDINATION | Facility: CLINIC | Age: 61
End: 2021-02-15

## 2021-02-15 NOTE — PROGRESS NOTES
Clinic Care Coordination Contact  [facility]/Voicemail       Clinical Data: CHW Outreach  Outreach attempted x 1. Left message on patient's voicemail with call back information and requested return call.    Plan: CHW will try to reach patient again in 10 business days.    Dulce Galan  Community Health Worker   [facility]  Care Coordination  [facility] and [facility]  [email or web address]   Office: [phone]  Fax: [phone]

## 2021-02-19 ENCOUNTER — PATIENT OUTREACH (OUTPATIENT)
Dept: CARE COORDINATION | Facility: CLINIC | Age: 61
End: 2021-02-19

## 2021-02-19 DIAGNOSIS — M25.571 RIGHT ANKLE PAIN: Primary | ICD-10-CM

## 2021-02-19 ASSESSMENT — ACTIVITIES OF DAILY LIVING (ADL): DEPENDENT_IADLS:: TRANSPORTATION

## 2021-02-19 NOTE — PROGRESS NOTES
Clinic Care Coordination Contact  [facility]/Voicemail    Referral Source: Care Team  11/23/2020  Post op problem   Bone spur removal from achilles tendon on 11/11/20. Here for drainage noted to posterior ankle cast site. Also, pt complains of nausea and inability to keep down solid food.  Clinical Data: Care Coordinator Outreach  Outreach attempted x 1.  Left message on patient's voicemail with call back information and requested return call.  Plan: . Care Coordinator will try to reach patient again in 3-5 business days.  [facility]   Echo Varghese RN, Care Coordinator   [facility] and [place]   E-mail [email or web address]   [phone]

## 2021-02-22 NOTE — PROGRESS NOTES
Chief Complaint   Patient presents with     RECHECK     followup wound check on right ankle             Allergies   Allergen Reactions     Levaquin Anaphylaxis and Rash     Swelling in lip and tongue felt thick     Lisinopril Anaphylaxis     Swollen tongue; inability to swallow; drooling; hives; swollen face, neck, angioedema     Acetaminophen      Hx of cirrhosis      Amlodipine      Swelling, hives, possible angioedema       Morphine      b/p dropped and arms went numb  Hypotension     Quinolones Hives     Bactrim [Sulfamethoxazole W/Trimethoprim] Rash         Subjective: Erwin is a 60 year old male who presents to the clinic today for a follow up of right Achilles ulceration. He was last seen a month ago. Relates that, for the most part, the wound has been vac'ed. Relates that the area around the wound was inflamed for a few days, and so the VAC was stopped for about a week. Relates that this helped with the pain.    Objective            A dehiscence wound is noted at right  posterior heel measuring 3cm x 0.5cm x 0.5cm.    Mix Classification: 2    Wound base: Yellow/Achilles. Red/granulation    Edges: epibole    Drainage: copious/serosanguinous    Odor: no    Undermining: no    Bone Exposure: No    Clinical Signs of Infection: No      After obtaining patient consent, the wound was irrigated with copious amounts of saline. A scalpel was then used to debride the wound into subcutaneous tissue. The wound edges were debrided back to healthy, bleeding tissue. The wound base exhibited healthy bleeding. Given the patient's lack of sensation, no anesthesia was necessary for the procedure.      Barriers to Healing: Erwin is a type 2 diabetic, but has controlled A1c. Wound is deep and more difficult to heal.     Treatment Plan: Medihoney and Primapore.       Pt Ability to Follow Plan: Good.    Assessment: Erwin is a 61 YO with right posterior heel dehiscence s/p surgery in November.  The wound has improved over the last  month. He did have some redness to the area, but I query as to whether this was coming from the anchor working its way out of the calc. The anchor is providing no structural support today. I discussed this with him and discussed that the wound would likely not heal over the loose anchor. I recommend removing this in clinic and he agrees.     Plan:   - Pt seen and evaluated  - Wound was debrided as described.   - Start Medihoney and Primapore.   - NWB to the foot.   - See again in 1 month.

## 2021-02-24 ENCOUNTER — PATIENT OUTREACH (OUTPATIENT)
Dept: CARE COORDINATION | Facility: CLINIC | Age: 61
End: 2021-02-24

## 2021-02-24 DIAGNOSIS — S91.301A OPEN WOUND OF RIGHT HEEL: Primary | ICD-10-CM

## 2021-02-24 ASSESSMENT — ACTIVITIES OF DAILY LIVING (ADL): DEPENDENT_IADLS:: TRANSPORTATION

## 2021-02-24 NOTE — PROGRESS NOTES
Incoming call from the patient/   Left a message to call him back on March 1    [facility]   Echo Varghese RN, Care Coordinator   [facility] and [place]   E-mail [email or web address]   [phone]

## 2021-02-24 NOTE — PROGRESS NOTES
Clinic Care Coordination Contact  [facility]/Voicemail      Referral Source: Care Team  11/23/2020  Post op problem   Bone spur removal from achilles tendon on 11/11/20. Here for drainage noted to posterior ankle cast site. Also, pt complains of nausea and inability to keep down solid food.  Clinical Data: Care Coordinator Outreach  Outreach attempted x 2.  Left message on patient's voicemail with call back information and requested return call.  Plan: C. Care Coordinator will try to reach patient again in 3-5 business days.  [facility]   Echo Varghese RN, Care Coordinator   [facility] and [place]   E-mail [email or web address]   [phone]

## 2021-02-26 ENCOUNTER — OFFICE VISIT (OUTPATIENT)
Dept: ORTHOPEDICS | Facility: CLINIC | Age: 61
End: 2021-02-26
Payer: MEDICARE

## 2021-02-26 DIAGNOSIS — Z79.4 TYPE 2 DIABETES MELLITUS WITH DIABETIC POLYNEUROPATHY, WITH LONG-TERM CURRENT USE OF INSULIN (H): ICD-10-CM

## 2021-02-26 DIAGNOSIS — M25.571 PAIN IN JOINT, ANKLE AND FOOT, RIGHT: Primary | ICD-10-CM

## 2021-02-26 DIAGNOSIS — L97.313 SKIN ULCER OF RIGHT ANKLE WITH NECROSIS OF MUSCLE (H): ICD-10-CM

## 2021-02-26 DIAGNOSIS — E11.42 TYPE 2 DIABETES MELLITUS WITH DIABETIC POLYNEUROPATHY, WITH LONG-TERM CURRENT USE OF INSULIN (H): ICD-10-CM

## 2021-02-26 PROCEDURE — 99213 OFFICE O/P EST LOW 20 MIN: CPT | Performed by: PODIATRIST

## 2021-02-26 NOTE — LETTER
2/26/2021         RE: Erwin Aguilar  [address]  [place] [address]        Dear Colleague,    Thank you for referring your patient, Erwin Aguilar, to the [facility] [place]. Please see a copy of my visit note below.    Chief Complaint   Patient presents with     RECHECK     followup wound check on right ankle             Allergies   Allergen Reactions     Levaquin Anaphylaxis and Rash     Swelling in lip and tongue felt thick     Lisinopril Anaphylaxis     Swollen tongue; inability to swallow; drooling; hives; swollen face, neck, angioedema     Acetaminophen      Hx of cirrhosis      Amlodipine      Swelling, hives, possible angioedema       Morphine      b/p dropped and arms went numb  Hypotension     Quinolones Hives     Bactrim [Sulfamethoxazole W/Trimethoprim] Rash         Subjective: Erwin is a 60 year old male who presents to the clinic today for a follow up of right Achilles ulceration. He was last seen a month ago. Relates that, for the most part, the wound has been vac'ed. Relates that the area around the wound was inflamed for a few days, and so the VAC was stopped for about a week. Relates that this helped with the pain.    Objective            A dehiscence wound is noted at right  posterior heel measuring 3cm x 0.5cm x 0.5cm.    Mix Classification: 2    Wound base: Yellow/Achilles. Red/granulation    Edges: epibole    Drainage: copious/serosanguinous    Odor: no    Undermining: no    Bone Exposure: No    Clinical Signs of Infection: No      After obtaining patient consent, the wound was irrigated with copious amounts of saline. A scalpel was then used to debride the wound into subcutaneous tissue. The wound edges were debrided back to healthy, bleeding tissue. The wound base exhibited healthy bleeding. Given the patient's lack of sensation, no anesthesia was necessary for the procedure.      Barriers to Healing: Erwin is a type 2 diabetic, but has controlled A1c.  Wound is deep and more difficult to heal.     Treatment Plan: Medihoney and Primapore.       Pt Ability to Follow Plan: Good.    Assessment: Erwin is a 59 YO with right posterior heel dehiscence s/p surgery in November.  The wound has improved over the last month. He did have some redness to the area, but I query as to whether this was coming from the anchor working its way out of the calc. The anchor is providing no structural support today. I discussed this with him and discussed that the wound would likely not heal over the loose anchor. I recommend removing this in clinic and he agrees.     Plan:   - Pt seen and evaluated  - Wound was debrided as described.   - Start Medihoney and Primapore.   - NWB to the foot.   - See again in 1 month.     Victor M Anaya DPM

## 2021-02-26 NOTE — NURSING NOTE
Reason For Visit:   Chief Complaint   Patient presents with     RECHECK     followup wound check on right ankle        There were no vitals taken for this visit.    Pain Assessment  Patient Currently in Pain: Yes  0-10 Pain Scale: 8  Primary Pain Location: Foot(right foot)    Rohini Eddy ATC

## 2021-02-28 ENCOUNTER — MYC MEDICAL ADVICE (OUTPATIENT)
Dept: FAMILY MEDICINE | Facility: CLINIC | Age: 61
End: 2021-02-28

## 2021-02-28 DIAGNOSIS — Z98.890 H/O FOOT SURGERY: ICD-10-CM

## 2021-02-28 DIAGNOSIS — G89.29 OTHER CHRONIC PAIN: ICD-10-CM

## 2021-02-28 DIAGNOSIS — M79.671 PAIN OF RIGHT HEEL: ICD-10-CM

## 2021-03-01 ENCOUNTER — E-VISIT (OUTPATIENT)
Dept: FAMILY MEDICINE | Facility: CLINIC | Age: 61
End: 2021-03-01
Payer: MEDICARE

## 2021-03-01 ENCOUNTER — PATIENT OUTREACH (OUTPATIENT)
Dept: NURSING | Facility: CLINIC | Age: 61
End: 2021-03-01
Payer: MEDICARE

## 2021-03-01 ENCOUNTER — TELEPHONE (OUTPATIENT)
Dept: ORTHOPEDICS | Facility: CLINIC | Age: 61
End: 2021-03-01

## 2021-03-01 DIAGNOSIS — M79.671 PAIN OF RIGHT HEEL: ICD-10-CM

## 2021-03-01 DIAGNOSIS — S91.301A OPEN WOUND OF RIGHT HEEL: Primary | ICD-10-CM

## 2021-03-01 DIAGNOSIS — G89.29 OTHER CHRONIC PAIN: ICD-10-CM

## 2021-03-01 DIAGNOSIS — Z98.890 H/O FOOT SURGERY: ICD-10-CM

## 2021-03-01 PROCEDURE — 99422 OL DIG E/M SVC 11-20 MIN: CPT | Performed by: FAMILY MEDICINE

## 2021-03-01 RX ORDER — OXYCODONE HYDROCHLORIDE 5 MG/1
5 TABLET ORAL 2 TIMES DAILY
Qty: 60 TABLET | Refills: 0 | Status: SHIPPED | OUTPATIENT
Start: 2021-03-01 | End: 2021-04-12

## 2021-03-01 ASSESSMENT — ACTIVITIES OF DAILY LIVING (ADL): DEPENDENT_IADLS:: TRANSPORTATION

## 2021-03-01 NOTE — TELEPHONE ENCOUNTER
Spoke with Frances Per dr Jaclyn Martinez [facility] and [place] daily Home care only goes out  Twice weekly. Pt can be taught to do himself . Unsure if he should be dc'd from home care. Not at this time         [facility] Call Center    Phone Message    May a detailed message be left on voicemail: yes     Reason for Call: Other: .  Other: Home Care never received orders so they are wondering what plan of care is for this patient.  Wound vac not needed anymore.  Please call to advise.     Action Taken: Routed to Wound     Travel Screening: Not Applicable

## 2021-03-01 NOTE — PROGRESS NOTES
Clinic Care Coordination Contact    Follow Up Progress Note      Assessment: Patient just had a visit with Dr Anaya and open area on heel is gradually healing .     Patient has a follow up March 26 to discuss possible surgery.  Patient will then make an appointment with Dr Fay/surgeon.  Patient continues home care services for dressing changes Home care RN is checking with the wound provider to see if patient can be re certified for home care extension of services   Patient states the heel pain is relieved somewhat with elevation but is not able to bear weight on his foot   Goals addressed this encounter:   Goals Addressed                 This Visit's Progress      Medical (pt-stated)   70%     Goal Statement: I will keep [facility] home care home visits to change heel dressing to promote healing   Date Goal set: 1/21/2021  Barriers: slow healing open wound  Strengths: Positive attitude   Date to Achieve By: 4/21.021  Patient expressed understanding of goal: Yes  Action steps to achieve this goal:  1. I will future appointments with Dr Sandoval and Dr Anaya /Podiatrist /Surgeon   2. I will keep wound dressing home care appointment 3 times a week  3. Care coordinator will follow up in 2-4 weeks              Intervention/Education provided during outreach: CC available as needed      Outreach Frequency: 2 weeks    Plan:   1. Patient will continue home care services for heel dressing care and dressing change   2, Care Coordinator will follow up after Dr Anaya/Podiatrist  appointment 3/26 to get an update     [facility]   Echo Varghese RN, Care Coordinator   [facility] and [place]   E-mail [email or web address]   [phone]

## 2021-03-01 NOTE — TELEPHONE ENCOUNTER
Provider E-Visit time total (minutes): 11    No suspicious activity on [place] rx monitoring database     Last filled 01/18/21 #60 tablets.     Joel Wegener,MD

## 2021-03-04 DIAGNOSIS — K31.84 GASTROPARESIS: ICD-10-CM

## 2021-03-04 DIAGNOSIS — Z79.899 MEDICATION MANAGEMENT: ICD-10-CM

## 2021-03-04 DIAGNOSIS — G89.4 CHRONIC PAIN SYNDROME: ICD-10-CM

## 2021-03-04 DIAGNOSIS — I10 HYPERTENSION GOAL BP (BLOOD PRESSURE) < 140/90: ICD-10-CM

## 2021-03-04 DIAGNOSIS — Z79.4 TYPE 2 DIABETES MELLITUS WITH DIABETIC POLYNEUROPATHY, WITH LONG-TERM CURRENT USE OF INSULIN (H): Primary | ICD-10-CM

## 2021-03-04 DIAGNOSIS — E11.42 TYPE 2 DIABETES MELLITUS WITH DIABETIC POLYNEUROPATHY, WITH LONG-TERM CURRENT USE OF INSULIN (H): Primary | ICD-10-CM

## 2021-03-04 DIAGNOSIS — E78.5 HYPERLIPIDEMIA LDL GOAL <100: ICD-10-CM

## 2021-03-04 DIAGNOSIS — R11.0 NAUSEA WITHOUT VOMITING: ICD-10-CM

## 2021-03-04 RX ORDER — PROMETHAZINE HYDROCHLORIDE 25 MG/1
TABLET ORAL
Qty: 90 TABLET | Refills: 1 | Status: SHIPPED | OUTPATIENT
Start: 2021-03-04 | End: 2021-04-26

## 2021-03-04 NOTE — TELEPHONE ENCOUNTER
JW  Spoke with patient and informed due for physical. Last seen 10/8/19    Due to his wound, says it is very difficult to get around. Would like to consolidate travel as much as possible.    Would like to see you in April for a physical. He is expected to have surgery again in April for his foot, says they may have to amputate.    Has labs ordered from Dr. Ramirez for INR, cbc and cmp, pt was wondering if you can order any additional labs for his physical and have these all done at once.     Requesting refills until then.    Thank you,  Genesis Claros, RN

## 2021-03-04 NOTE — TELEPHONE ENCOUNTER
Done, excellent plan.     Added a1c, lipids, urine albumin and yearly urine drug screen.     Joel Wegener,MD

## 2021-03-04 NOTE — TELEPHONE ENCOUNTER
LVM for patient to schedule physical with Wegener, is overdue.  Also sent mychart reminder to schedule.  Will wait for patient response.    Genesis Claros RN

## 2021-03-05 ENCOUNTER — MYC MEDICAL ADVICE (OUTPATIENT)
Dept: FAMILY MEDICINE | Facility: CLINIC | Age: 61
End: 2021-03-05

## 2021-03-08 ENCOUNTER — MEDICAL CORRESPONDENCE (OUTPATIENT)
Dept: HEALTH INFORMATION MANAGEMENT | Facility: CLINIC | Age: 61
End: 2021-03-08

## 2021-03-08 ENCOUNTER — TELEPHONE (OUTPATIENT)
Dept: FAMILY MEDICINE | Facility: CLINIC | Age: 61
End: 2021-03-08

## 2021-03-08 NOTE — TELEPHONE ENCOUNTER
Reason for Call:  Form, our goal is to have forms completed with 72 hours, however, some forms may require a visit or additional information.    Type of letter, form or note:  homecare order    Who is the form from?: [facility] (if other please explain)    Where did the form come from: form was faxed in    What clinic location was the form placed at?: [facility]    Where the form was placed: [facility] Box/Folder    What number is listed as a contact on the form?:  Fax [phone]       Additional comments:     Call taken on 3/8/2021 at 12:03 PM by Richard Alcantar

## 2021-03-13 ENCOUNTER — HEALTH MAINTENANCE LETTER (OUTPATIENT)
Age: 61
End: 2021-03-13

## 2021-03-16 ENCOUNTER — MEDICAL CORRESPONDENCE (OUTPATIENT)
Dept: HEALTH INFORMATION MANAGEMENT | Facility: CLINIC | Age: 61
End: 2021-03-16

## 2021-03-17 ENCOUNTER — TELEPHONE (OUTPATIENT)
Dept: FAMILY MEDICINE | Facility: CLINIC | Age: 61
End: 2021-03-17

## 2021-03-17 NOTE — TELEPHONE ENCOUNTER
Reason for Call:  Form, our goal is to have forms completed with 72 hours, however, some forms may require a visit or additional information.    Type of letter, form or note:  SK nursing order 3/1/2021    Who is the form from?: [facility] (if other please explain)    Where did the form come from: form was faxed in    What clinic location was the form placed at?: [facility]    Where the form was placed: [facility]    What number is listed as a contact on the form?:  Fax [phone]       Additional comments:     Call taken on 3/17/2021 at 10:43 AM by Richard Alcantar

## 2021-03-23 ENCOUNTER — MEDICAL CORRESPONDENCE (OUTPATIENT)
Dept: HEALTH INFORMATION MANAGEMENT | Facility: CLINIC | Age: 61
End: 2021-03-23

## 2021-03-25 ENCOUNTER — OFFICE VISIT (OUTPATIENT)
Dept: WOUND CARE | Facility: CLINIC | Age: 61
End: 2021-03-25
Payer: MEDICARE

## 2021-03-25 ENCOUNTER — ANCILLARY PROCEDURE (OUTPATIENT)
Dept: ULTRASOUND IMAGING | Facility: CLINIC | Age: 61
End: 2021-03-25
Attending: INTERNAL MEDICINE
Payer: MEDICARE

## 2021-03-25 DIAGNOSIS — E11.42 TYPE 2 DIABETES MELLITUS WITH DIABETIC POLYNEUROPATHY, WITH LONG-TERM CURRENT USE OF INSULIN (H): ICD-10-CM

## 2021-03-25 DIAGNOSIS — L97.312 SKIN ULCER OF RIGHT ANKLE WITH FAT LAYER EXPOSED (H): Primary | ICD-10-CM

## 2021-03-25 DIAGNOSIS — K70.30 ALCOHOLIC CIRRHOSIS OF LIVER WITHOUT ASCITES (H): ICD-10-CM

## 2021-03-25 DIAGNOSIS — Z79.4 TYPE 2 DIABETES MELLITUS WITH DIABETIC POLYNEUROPATHY, WITH LONG-TERM CURRENT USE OF INSULIN (H): ICD-10-CM

## 2021-03-25 PROCEDURE — 76700 US EXAM ABDOM COMPLETE: CPT | Mod: GC | Performed by: RADIOLOGY

## 2021-03-25 PROCEDURE — 11042 DBRDMT SUBQ TIS 1ST 20SQCM/<: CPT | Performed by: PODIATRIST

## 2021-03-25 ASSESSMENT — PAIN SCALES - GENERAL: PAINLEVEL: EXTREME PAIN (8)

## 2021-03-25 NOTE — PROGRESS NOTES
Chief Complaint   Patient presents with     WOUND CARE     Pt here for wound care            Allergies   Allergen Reactions     Levaquin Anaphylaxis and Rash     Swelling in lip and tongue felt thick     Lisinopril Anaphylaxis     Swollen tongue; inability to swallow; drooling; hives; swollen face, neck, angioedema     Acetaminophen      Hx of cirrhosis      Amlodipine      Swelling, hives, possible angioedema       Morphine      b/p dropped and arms went numb  Hypotension     Quinolones Hives     Bactrim [Sulfamethoxazole W/Trimethoprim] Rash         Subjective: Erwin is a 60 year old male who presents to the clinic today for a follow up of right Achilles ulceration. He was last seen a month ago. He has been using the Medihoney on the ulceration. It does not have drainage. He would like to see Dr. Sandoval again to discuss further surgical options.     Objective                A dehiscence wound is noted at right  posterior heel measuring 1cm x 0.5cm x 0.5cm at the proximal edge, but in    Mix Classification: 2    Wound base: Yellow/Achilles. Red/granulation    Edges: epibole    Drainage: copious/serosanguinous    Odor: no    Undermining: no    Bone Exposure: No    Clinical Signs of Infection: No      After obtaining patient consent, the wound was irrigated with copious amounts of saline. A curette was then used to debride the wound into subcutaneous tissue. The wound edges were debrided back to healthy, bleeding tissue. The wound base exhibited healthy bleeding. Given the patient's lack of sensation, no anesthesia was necessary for the procedure.      Barriers to Healing: Erwin is a type 2 diabetic, but has controlled A1c. Wound is deep and more difficult to heal.     Treatment Plan: Medihoney and Primapore.       Pt Ability to Follow Plan: Good.    Assessment: Erwin is a 59 YO with right posterior heel dehiscence s/p surgery in November.  The wound has improved over the last month. He would like to see Dr. Sandoval  re: further sx, including BKA.     Plan:   - Pt seen and evaluated  - Wound was debrided as described.   - Start MeSalt and DSD.   - NWB to the foot.   - See again in 1 month. Seeing Dr. Sandoval on Tuesday.

## 2021-03-25 NOTE — NURSING NOTE
Chief Complaint   Patient presents with     WOUND CARE     Pt here for wound care       There were no vitals filed for this visit.    There is no height or weight on file to calculate BMI.      NEO Stroud NREMT                    No vitals take per provider

## 2021-03-25 NOTE — LETTER
3/25/2021       RE: Erwin Aguilar  [address] [place] [address]     Dear Colleague,    Thank you for referring your patient, Erwin Aguilar, to the [facility] [place] [facility]. Please see a copy of my visit note below.    Chief Complaint   Patient presents with     WOUND CARE     Pt here for wound care        Allergies   Allergen Reactions     Levaquin Anaphylaxis and Rash     Swelling in lip and tongue felt thick     Lisinopril Anaphylaxis     Swollen tongue; inability to swallow; drooling; hives; swollen face, neck, angioedema     Acetaminophen      Hx of cirrhosis      Amlodipine      Swelling, hives, possible angioedema       Morphine      b/p dropped and arms went numb  Hypotension     Quinolones Hives     Bactrim [Sulfamethoxazole W/Trimethoprim] Rash         Subjective: Erwin is a 60 year old male who presents to the clinic today for a follow up of right Achilles ulceration. He was last seen a month ago. He has been using the Medihoney on the ulceration. It does not have drainage. He would like to see Dr. Sandoval again to discuss further surgical options.     Objective        A dehiscence wound is noted at right  posterior heel measuring 1cm x 0.5cm x 0.5cm at the proximal edge, but in    Mix Classification: 2    Wound base: Yellow/Achilles. Red/granulation    Edges: epibole    Drainage: copious/serosanguinous    Odor: no    Undermining: no    Bone Exposure: No    Clinical Signs of Infection: No      After obtaining patient consent, the wound was irrigated with copious amounts of saline. A curette was then used to debride the wound into subcutaneous tissue. The wound edges were debrided back to healthy, bleeding tissue. The wound base exhibited healthy bleeding. Given the patient's lack of sensation, no anesthesia was necessary for the procedure.      Barriers to Healing: Erwin is a type 2 diabetic, but has controlled A1c. Wound is  deep and more difficult to heal.     Treatment Plan: Medihoney and Primapore.       Pt Ability to Follow Plan: Good.    Assessment: Erwin is a 59 YO with right posterior heel dehiscence s/p surgery in November.  The wound has improved over the last month. He would like to see Dr. Sandoval re: further sx, including BKA.     Plan:   - Pt seen and evaluated  - Wound was debrided as described.   - Start MeSalt and DSD.   - NWB to the foot.   - See again in 1 month. Seeing Dr. Sandoval on Tuesday.     Again, thank you for allowing me to participate in the care of your patient.      Sincerely,    Victor M Anaya DPM

## 2021-03-30 ENCOUNTER — OFFICE VISIT (OUTPATIENT)
Dept: ORTHOPEDICS | Facility: CLINIC | Age: 61
End: 2021-03-30
Payer: MEDICARE

## 2021-03-30 DIAGNOSIS — K31.84 GASTROPARESIS: ICD-10-CM

## 2021-03-30 DIAGNOSIS — R11.0 NAUSEA WITHOUT VOMITING: ICD-10-CM

## 2021-03-30 DIAGNOSIS — M25.571 PAIN IN JOINT, ANKLE AND FOOT, RIGHT: Primary | ICD-10-CM

## 2021-03-30 PROCEDURE — 99213 OFFICE O/P EST LOW 20 MIN: CPT | Performed by: ORTHOPAEDIC SURGERY

## 2021-03-30 ASSESSMENT — PAIN SCALES - GENERAL: PAINLEVEL: EXTREME PAIN (9)

## 2021-03-30 NOTE — NURSING NOTE
Reason For Visit:   Chief Complaint   Patient presents with     Right Ankle - Surgical Followup     Surgical Followup     right achilles tendon debridment nov 2020       There were no vitals taken for this visit.         Brcue Galeano CMA

## 2021-03-30 NOTE — LETTER
3/30/2021       RE: Erwin Aguilar  [address]  [place] [address]    Dear Colleague,    Thank you for referring your patient, Erwin Aguilar, to the [facility] [place]. Please see a copy of my visit note below.    CHIEF COMPLAINT:  Status post right Achilles tendon debridement with partial calcaneus excision and subsequent reattachment performed on 11/11/2020.      HISTORY OF PRESENT ILLNESS:  Mr. Aguilar presents today for further followup.  Reports to be doing well.  He is continuing to be under the care of Dr. Anaya and they seem to be making progress.      PHYSICAL EXAMINATION:  On today's visit, he continues having a wound that is actually fairly small.  There is no drainage.  There are no signs of infection or inflammation.  The wound is approximately 4 cm in length x 4-5 mm in width.      The patient proceeded with range of motion from a few degrees of dorsiflexion down to 30 degrees of plantar flexion.      ASSESSMENT:  Status post right Achilles tendon debridement with partial calcaneus excision and subsequent reattachment.      PLAN:  I discussed with the patient the fact that he is making progress with the healing of the wound.  My recommendation is to continue working in that direction.  He will maintain further followup with Dr. Anaya.      He also illustrated to us how much he is struggling with the right leg pain and nerve stimulator.  A referral for the Pain Clinic has been placed for him to have his nerve stimulator evaluated and possibly adjusted.      All questions were answered.  Patient was pleased with the discussion.  The patient will follow up accordingly.      TT 20 minutes.  CT 15 minutes.   Eh Sandoval MD

## 2021-03-30 NOTE — PROGRESS NOTES
CHIEF COMPLAINT:  Status post right Achilles tendon debridement with partial calcaneus excision and subsequent reattachment performed on 11/11/2020.      HISTORY OF PRESENT ILLNESS:  Mr. Aguilar presents today for further followup.  Reports to be doing well.  He is continuing to be under the care of Dr. Anaya and they seem to be making progress.      PHYSICAL EXAMINATION:  On today's visit, he continues having a wound that is actually fairly small.  There is no drainage.  There are no signs of infection or inflammation.  The wound is approximately 4 cm in length x 4-5 mm in width.      The patient proceeded with range of motion from a few degrees of dorsiflexion down to 30 degrees of plantar flexion.      ASSESSMENT:  Status post right Achilles tendon debridement with partial calcaneus excision and subsequent reattachment.      PLAN:  I discussed with the patient the fact that he is making progress with the healing of the wound.  My recommendation is to continue working in that direction.  He will maintain further followup with Dr. Anaya.      He also illustrated to us how much he is struggling with the right leg pain and nerve stimulator.  A referral for the Pain Clinic has been placed for him to have his nerve stimulator evaluated and possibly adjusted.      All questions were answered.  Patient was pleased with the discussion.  The patient will follow up accordingly.      TT 20 minutes.  CT 15 minutes.

## 2021-03-31 ENCOUNTER — MYC MEDICAL ADVICE (OUTPATIENT)
Dept: FAMILY MEDICINE | Facility: CLINIC | Age: 61
End: 2021-03-31

## 2021-03-31 DIAGNOSIS — E11.9 TYPE 2 DIABETES MELLITUS WITHOUT COMPLICATION, WITH LONG-TERM CURRENT USE OF INSULIN (H): ICD-10-CM

## 2021-03-31 DIAGNOSIS — Z79.4 TYPE 2 DIABETES MELLITUS WITHOUT COMPLICATION, WITH LONG-TERM CURRENT USE OF INSULIN (H): ICD-10-CM

## 2021-03-31 RX ORDER — PROMETHAZINE HYDROCHLORIDE 25 MG/1
TABLET ORAL
Start: 2021-03-31

## 2021-04-01 ENCOUNTER — MYC MEDICAL ADVICE (OUTPATIENT)
Dept: FAMILY MEDICINE | Facility: CLINIC | Age: 61
End: 2021-04-01

## 2021-04-01 RX ORDER — INSULIN GLARGINE 100 [IU]/ML
26 INJECTION, SOLUTION SUBCUTANEOUS DAILY
Qty: 24 ML | Refills: 3 | Status: SHIPPED | OUTPATIENT
Start: 2021-04-01 | End: 2021-08-23

## 2021-04-01 NOTE — TELEPHONE ENCOUNTER
JW,    Routing refill request to provider for review/approval because:  Overdue for A1C and OV.  Has future lab appointment on 4/9    Thanks,  Isela Villar RN

## 2021-04-03 ENCOUNTER — MYC MEDICAL ADVICE (OUTPATIENT)
Dept: FAMILY MEDICINE | Facility: CLINIC | Age: 61
End: 2021-04-03

## 2021-04-05 ENCOUNTER — PATIENT OUTREACH (OUTPATIENT)
Dept: NURSING | Facility: CLINIC | Age: 61
End: 2021-04-05
Payer: MEDICARE

## 2021-04-05 DIAGNOSIS — S91.301A OPEN WOUND OF RIGHT HEEL: Primary | ICD-10-CM

## 2021-04-05 ASSESSMENT — ACTIVITIES OF DAILY LIVING (ADL): DEPENDENT_IADLS:: TRANSPORTATION

## 2021-04-05 NOTE — PROGRESS NOTES
Clinic Care Coordination Contact    Follow Up Progress Note      Assessment: Patient reports his open wound on heel is completely closed.  Pain continues to be as high as a #10 taking Oxycodone and no weight bearing.  Patient states there is a disagreement between Dr Sandoval and Jaclyn as to whether he should start PT for weight bearing.  Patient has a second opinion 4/14 with Dr Sweet at the [facility] in [place].  Patient purchased an electric  Bike and it should be delivered any day.   Patient is hoping an amputation might be the plan.  Patient watched his brother delay treatment for a long time and now both of his legs  amputated due to diabetes and he is now going on with his life   Patient now has a bone spur on his left foot .  Goals addressed this encounter:   Goals Addressed                 This Visit's Progress      Pain management (pt-stated)   30%     Goal Statement: I will decrease my heel pain in the next 6 months   Date Goal set: 4/5/2021  Barriers: slow healing open wound  Strengths: Positive attitude   Date to Achieve By: 10/5/2021  Patient expressed understanding of goal: Yes  Action steps to achieve this goal:  1. I will future appointments with Dr Sandoval and Dr Anaya /Podiatrist /Surgeon   2. I will keep my appointment with Dr Sweet /[facility] 4/14/2021  3. I will use my electric anaid to increase mobility outside   4. I will take my pain medication as directed   3. Care coordinator will follow up in 2-4 weeks              Intervention/Education provided during outreach: Not discussed      Outreach Frequency: 2 weeks    Plan:   C RN will follow up after 4/14/2021 visit with new provider at [facility] Dr Sweet   [facility]   Echo Varghese RN, Care Coordinator   [facility] and [place]   E-mail [email or web address]   [phone]

## 2021-04-09 ENCOUNTER — MYC MEDICAL ADVICE (OUTPATIENT)
Dept: FAMILY MEDICINE | Facility: CLINIC | Age: 61
End: 2021-04-09

## 2021-04-09 DIAGNOSIS — E78.5 HYPERLIPIDEMIA LDL GOAL <100: ICD-10-CM

## 2021-04-09 DIAGNOSIS — K70.30 ALCOHOLIC CIRRHOSIS OF LIVER WITHOUT ASCITES (H): ICD-10-CM

## 2021-04-09 DIAGNOSIS — E11.42 TYPE 2 DIABETES MELLITUS WITH DIABETIC POLYNEUROPATHY, WITH LONG-TERM CURRENT USE OF INSULIN (H): ICD-10-CM

## 2021-04-09 DIAGNOSIS — G89.4 CHRONIC PAIN SYNDROME: ICD-10-CM

## 2021-04-09 DIAGNOSIS — Z79.4 TYPE 2 DIABETES MELLITUS WITH DIABETIC POLYNEUROPATHY, WITH LONG-TERM CURRENT USE OF INSULIN (H): ICD-10-CM

## 2021-04-09 DIAGNOSIS — Z79.899 MEDICATION MANAGEMENT: ICD-10-CM

## 2021-04-09 LAB
ALBUMIN SERPL-MCNC: 4.1 G/DL (ref 3.4–5)
ALP SERPL-CCNC: 115 U/L (ref 40–150)
ALT SERPL W P-5'-P-CCNC: 27 U/L (ref 0–70)
ANION GAP SERPL CALCULATED.3IONS-SCNC: 6 MMOL/L (ref 3–14)
AST SERPL W P-5'-P-CCNC: 14 U/L (ref 0–45)
BILIRUB SERPL-MCNC: 0.7 MG/DL (ref 0.2–1.3)
BUN SERPL-MCNC: 7 MG/DL (ref 7–30)
CALCIUM SERPL-MCNC: 8.9 MG/DL (ref 8.5–10.1)
CHLORIDE SERPL-SCNC: 109 MMOL/L (ref 94–109)
CHOLEST SERPL-MCNC: 145 MG/DL
CO2 SERPL-SCNC: 27 MMOL/L (ref 20–32)
CREAT SERPL-MCNC: 0.68 MG/DL (ref 0.66–1.25)
ERYTHROCYTE [DISTWIDTH] IN BLOOD BY AUTOMATED COUNT: 12.6 % (ref 10–15)
GFR SERPL CREATININE-BSD FRML MDRD: >90 ML/MIN/{1.73_M2}
GLUCOSE SERPL-MCNC: 121 MG/DL (ref 70–99)
HBA1C MFR BLD: 6 % (ref 0–5.6)
HCT VFR BLD AUTO: 40.6 % (ref 40–53)
HDLC SERPL-MCNC: 48 MG/DL
HGB BLD-MCNC: 14.1 G/DL (ref 13.3–17.7)
INR PPP: 1.01 (ref 0.86–1.14)
LDLC SERPL CALC-MCNC: 78 MG/DL
MCH RBC QN AUTO: 29.4 PG (ref 26.5–33)
MCHC RBC AUTO-ENTMCNC: 34.7 G/DL (ref 31.5–36.5)
MCV RBC AUTO: 85 FL (ref 78–100)
NONHDLC SERPL-MCNC: 97 MG/DL
PLATELET # BLD AUTO: 200 10E9/L (ref 150–450)
POTASSIUM SERPL-SCNC: 3.9 MMOL/L (ref 3.4–5.3)
PROT SERPL-MCNC: 8 G/DL (ref 6.8–8.8)
RBC # BLD AUTO: 4.8 10E12/L (ref 4.4–5.9)
SODIUM SERPL-SCNC: 142 MMOL/L (ref 133–144)
TRIGL SERPL-MCNC: 97 MG/DL
WBC # BLD AUTO: 6.5 10E9/L (ref 4–11)

## 2021-04-09 PROCEDURE — 83036 HEMOGLOBIN GLYCOSYLATED A1C: CPT | Performed by: FAMILY MEDICINE

## 2021-04-09 PROCEDURE — 85610 PROTHROMBIN TIME: CPT | Performed by: INTERNAL MEDICINE

## 2021-04-09 PROCEDURE — 80061 LIPID PANEL: CPT | Performed by: FAMILY MEDICINE

## 2021-04-09 PROCEDURE — 85027 COMPLETE CBC AUTOMATED: CPT | Performed by: INTERNAL MEDICINE

## 2021-04-09 PROCEDURE — 80053 COMPREHEN METABOLIC PANEL: CPT | Performed by: INTERNAL MEDICINE

## 2021-04-09 PROCEDURE — 36415 COLL VENOUS BLD VENIPUNCTURE: CPT | Performed by: FAMILY MEDICINE

## 2021-04-13 ENCOUNTER — PATIENT OUTREACH (OUTPATIENT)
Dept: CARE COORDINATION | Facility: CLINIC | Age: 61
End: 2021-04-13

## 2021-04-13 DIAGNOSIS — S91.301A OPEN WOUND OF RIGHT HEEL: Primary | ICD-10-CM

## 2021-04-13 ASSESSMENT — ACTIVITIES OF DAILY LIVING (ADL): DEPENDENT_IADLS:: TRANSPORTATION

## 2021-04-13 NOTE — PROGRESS NOTES
Clinic Care Coordination Contact  Patient left a update on CC RN VM yesterday 4/12/2021 7pm after appointment with [facility] provider   Patient states he was told he has Complex Regional Pain Syndrome from previous surgery.  Foot is red and so would not consider surgery until the heel is healed.  Healing process could be months to years   An operation could cause more pain .  Patient feels he is in a bad spot and has to forget about surgery for awhile   Patient might need someone else to talk to .     Clinic Care Coordination Contact  [facility]/Voicemail       Clinical Data: Care Coordinator Outreach  Outreach attempted x 1.  Left message on patient's voicemail with call back information and requested return call. Left a message to call CC RN back if any additional assitance is needed .  Might need to set up a telephone visit with PCP to discuss further   Plan: Care Coordinator {will await a return call from the patient  [facility]   Echo Varghese RN, Care Coordinator   [facility] and [place]   E-mail [email or web address]   [phone]

## 2021-04-15 ENCOUNTER — MYC MEDICAL ADVICE (OUTPATIENT)
Dept: FAMILY MEDICINE | Facility: CLINIC | Age: 61
End: 2021-04-15

## 2021-04-15 ENCOUNTER — TELEPHONE (OUTPATIENT)
Dept: ORTHOPEDICS | Facility: CLINIC | Age: 61
End: 2021-04-15

## 2021-04-16 ENCOUNTER — MYC MEDICAL ADVICE (OUTPATIENT)
Dept: FAMILY MEDICINE | Facility: CLINIC | Age: 61
End: 2021-04-16

## 2021-04-21 ENCOUNTER — TELEPHONE (OUTPATIENT)
Dept: WOUND CARE | Facility: CLINIC | Age: 61
End: 2021-04-21

## 2021-04-21 NOTE — TELEPHONE ENCOUNTER
Needed extension to complete call    ----- Message from Breanna Sahni RN sent at 4/21/2021  7:19 AM CDT -----  Janey daniel 2/25/2021 discontinue measurements   [phone]

## 2021-04-27 ENCOUNTER — VIRTUAL VISIT (OUTPATIENT)
Dept: FAMILY MEDICINE | Facility: CLINIC | Age: 61
End: 2021-04-27
Payer: MEDICARE

## 2021-04-27 DIAGNOSIS — M21.6X1 ACQUIRED CALCANEUS DEFORMITY OF RIGHT ANKLE: ICD-10-CM

## 2021-04-27 DIAGNOSIS — G89.4 CHRONIC PAIN SYNDROME: ICD-10-CM

## 2021-04-27 DIAGNOSIS — S91.301D OPEN WOUND OF RIGHT HEEL, SUBSEQUENT ENCOUNTER: Primary | ICD-10-CM

## 2021-04-27 DIAGNOSIS — Z79.4 TYPE 2 DIABETES MELLITUS WITH DIABETIC POLYNEUROPATHY, WITH LONG-TERM CURRENT USE OF INSULIN (H): ICD-10-CM

## 2021-04-27 DIAGNOSIS — E11.42 TYPE 2 DIABETES MELLITUS WITH DIABETIC POLYNEUROPATHY, WITH LONG-TERM CURRENT USE OF INSULIN (H): ICD-10-CM

## 2021-04-27 DIAGNOSIS — M65.28 CALCIFIC ACHILLES TENDINITIS: ICD-10-CM

## 2021-04-27 DIAGNOSIS — Z98.890 H/O FOOT SURGERY: ICD-10-CM

## 2021-04-27 DIAGNOSIS — M54.17 LUMBOSACRAL RADICULOPATHY: ICD-10-CM

## 2021-04-27 PROCEDURE — 99214 OFFICE O/P EST MOD 30 MIN: CPT | Mod: 95 | Performed by: FAMILY MEDICINE

## 2021-04-27 ASSESSMENT — ANXIETY QUESTIONNAIRES
7. FEELING AFRAID AS IF SOMETHING AWFUL MIGHT HAPPEN: NOT AT ALL
GAD7 TOTAL SCORE: 8
4. TROUBLE RELAXING: MORE THAN HALF THE DAYS
5. BEING SO RESTLESS THAT IT IS HARD TO SIT STILL: MORE THAN HALF THE DAYS
2. NOT BEING ABLE TO STOP OR CONTROL WORRYING: NOT AT ALL
GAD7 TOTAL SCORE: 8
1. FEELING NERVOUS, ANXIOUS, OR ON EDGE: MORE THAN HALF THE DAYS
3. WORRYING TOO MUCH ABOUT DIFFERENT THINGS: NOT AT ALL
7. FEELING AFRAID AS IF SOMETHING AWFUL MIGHT HAPPEN: NOT AT ALL
6. BECOMING EASILY ANNOYED OR IRRITABLE: MORE THAN HALF THE DAYS
GAD7 TOTAL SCORE: 8

## 2021-04-27 ASSESSMENT — PATIENT HEALTH QUESTIONNAIRE - PHQ9
SUM OF ALL RESPONSES TO PHQ QUESTIONS 1-9: 9
SUM OF ALL RESPONSES TO PHQ QUESTIONS 1-9: 9

## 2021-04-27 NOTE — PATIENT INSTRUCTIONS
Erwin is a 60 year old who is being evaluated via a billable video visit.      How would you like to obtain your AVS? MyChart  If the video visit is dropped, the invitation should be resent by: Text to cell phone:    Will anyone else be joining your video visit? No      Video Start Time: 1:11    ASSESSMENT AND PLAN  1. Open wound of right heel, subsequent encounter  1:11-1:33    Very good mtg.     Pain much improved with medical cannabis.   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   Having appointment with wound nurse he has worked with in past next week.     Has pain managemnet appt for CRPS and also back pump management may 4th (had to be re-scheduled).     Current issues:  1.  Unclear how much weight can put on foot with wound still.   2.  Even if wound heals foot anatomy not stable due to bones/ligaments, heel essentially not intact (his understanding is that calcaneous is destroyed) Unsure how much weight can put on.   3.  Multiple surgeons would not want to operate with CRPS  4.  transportation barriers being homebound now.       Plan:  1.  I will get second opinion note from [facility]  2.  I will order home physical therapy assessment  3.  I will disucss with previous providers (dr. Sandoval/inez) re: once wound heals how much weight bearing?  4.  I will make orthotics referral to help determine if he could start to be weight bearing with a lower leg prosthetic.       2. Lumbosacral radiculopathy      3. Calcific Achilles tendinitis      4. Type 2 diabetes mellitus with diabetic polyneuropathy, with long-term current use of insulin (H)      5. Chronic pain syndrome  No longer needing opiods.     6. H/O foot surgery      7. Acquired calcaneus deformity of right ankle                   BMI:   Estimated body mass index is 28.21 kg/m  as calculated from the following:    Height as of 1/19/21: 1.829 m (6').    Weight as of 1/19/21: 94.3 kg (208 lb).           No follow-ups on file.    Joel Daniel Wegener, MD  M  [facility]    JOHANN Hood is a 60 year old who presents for the following health issues:  Foot disability with failed achilles surgery and chronic wound.     providsional diagnosis crps well treated with medical cannabis at this point.      Frustrated unable to put together a clear plan to be able to use foot/walk again.            [unfilled]     [unfilled]   [unfilled]          Vitals:  No vitals were obtained today due to virtual visit.    [unfilled]   GENERAL: Healthy, alert and no distress  EYES: Eyes grossly normal to inspection.  No discharge or erythema, or obvious scleral/conjunctival abnormalities.  RESP: No audible wheeze, cough, or visible cyanosis.  No visible retractions or increased work of breathing.    SKIN: Visible skin clear. No significant rash, abnormal pigmentation or lesions.  NEURO: Cranial nerves grossly intact.  Mentation and speech appropriate for age.  PSYCH: Mentation appears normal, affect normal/bright, judgement and insight intact, normal speech and appearance well-groomed.          [unfilled]     Video-Visit Details    Type of service:  Video Visit    Video End Time:1:33    Originating Location (pt. Location): Home    Distant Location (provider location):  [facility]     Platform used for Video Visit: [employer]

## 2021-04-28 ASSESSMENT — PATIENT HEALTH QUESTIONNAIRE - PHQ9: SUM OF ALL RESPONSES TO PHQ QUESTIONS 1-9: 9

## 2021-04-28 ASSESSMENT — ANXIETY QUESTIONNAIRES: GAD7 TOTAL SCORE: 8

## 2021-04-29 ENCOUNTER — OFFICE VISIT (OUTPATIENT)
Dept: WOUND CARE | Facility: CLINIC | Age: 61
End: 2021-04-29
Payer: MEDICARE

## 2021-04-29 ENCOUNTER — MYC MEDICAL ADVICE (OUTPATIENT)
Dept: FAMILY MEDICINE | Facility: CLINIC | Age: 61
End: 2021-04-29

## 2021-04-29 DIAGNOSIS — L97.312 SKIN ULCER OF RIGHT ANKLE WITH FAT LAYER EXPOSED (H): Primary | ICD-10-CM

## 2021-04-29 DIAGNOSIS — E11.42 TYPE 2 DIABETES MELLITUS WITH DIABETIC POLYNEUROPATHY, WITH LONG-TERM CURRENT USE OF INSULIN (H): ICD-10-CM

## 2021-04-29 DIAGNOSIS — Z79.4 TYPE 2 DIABETES MELLITUS WITH DIABETIC POLYNEUROPATHY, WITH LONG-TERM CURRENT USE OF INSULIN (H): ICD-10-CM

## 2021-04-29 PROCEDURE — 99211 OFF/OP EST MAY X REQ PHY/QHP: CPT

## 2021-04-29 NOTE — PROGRESS NOTES
Patient comes to wound clinic for wound assessment per request of dr. Sandoval. He has history of a Open surgical wound due to :   PROCEDURES:   1.  Right Achilles tendon debridement.   2.  Right calcaneus partial excision.   3.  Right Achilles tendon reattachment on 11/11/2021  Clean gloves are donned and current dressing removed.     Objective findings:    Skin ulcer of right ankle with fat layer exposed (H)  Type 2 diabetes mellitus with diabetic polyneuropathy, with long-term current use of insulin (H)    Number of wounds: 1    Location: right heel       Type of wound:   Post-operative wound: Yes,     Wound measured: Length 3 cm x Width 0.2 cm x Depth 0.3 cm ,     Thickness: Full    Drainage: scant, serosanguinous     Tunneling:  N/A      Undermining: N/A    Wound specifics:    Wound Base:  , not visible      Devitalized Tissue or Slough appearance:  none         Eschar appearance:  none        Tenderness: Fully effective control    Odor: none    Periwound Skin: intact,       Add dotphrase SILVERNITRATE         Subjective finding:     Pt states: frustrated about slow healing    Patient is assessed for discomfort which is: unable to assess, patietn has chronic pain and c/o pain in fott leg and wound. However handeling the wound does not appear to cause pain.     Today's status of the wound: stalled    Treatment:  Removal of existing dressing  Cleansing with technicare soap solution  Irrigation with saline solution  Wound packing with HydroferaBlue dressing  Application of clean dressing    Plan of care:  As above every other day    Signs and symptoms of infection taught.  Patient needs to be seen 2 weeks. Treated and follow-up appointment made. Dr. Anaya was available for supervision of care if needed or if questions should arise and regarding plan of care.  Breanna Sahni RN, CWON

## 2021-05-03 ENCOUNTER — PATIENT OUTREACH (OUTPATIENT)
Dept: NURSING | Facility: CLINIC | Age: 61
End: 2021-05-03
Payer: MEDICARE

## 2021-05-03 DIAGNOSIS — S91.301A OPEN WOUND OF RIGHT HEEL: Primary | ICD-10-CM

## 2021-05-03 ASSESSMENT — ACTIVITIES OF DAILY LIVING (ADL): DEPENDENT_IADLS:: TRANSPORTATION

## 2021-05-04 ENCOUNTER — PATIENT OUTREACH (OUTPATIENT)
Dept: CARE COORDINATION | Facility: CLINIC | Age: 61
End: 2021-05-04

## 2021-05-04 ENCOUNTER — MYC MEDICAL ADVICE (OUTPATIENT)
Dept: ANESTHESIOLOGY | Facility: CLINIC | Age: 61
End: 2021-05-04

## 2021-05-04 ENCOUNTER — OFFICE VISIT (OUTPATIENT)
Dept: ANESTHESIOLOGY | Facility: CLINIC | Age: 61
End: 2021-05-04
Payer: MEDICARE

## 2021-05-04 VITALS — DIASTOLIC BLOOD PRESSURE: 110 MMHG | SYSTOLIC BLOOD PRESSURE: 188 MMHG | RESPIRATION RATE: 16 BRPM | HEART RATE: 76 BPM

## 2021-05-04 DIAGNOSIS — S91.301A OPEN WOUND OF RIGHT HEEL: Primary | ICD-10-CM

## 2021-05-04 DIAGNOSIS — M79.2 NEUROPATHIC PAIN: Primary | ICD-10-CM

## 2021-05-04 PROCEDURE — 99213 OFFICE O/P EST LOW 20 MIN: CPT | Performed by: ANESTHESIOLOGY

## 2021-05-04 ASSESSMENT — PAIN SCALES - GENERAL: PAINLEVEL: SEVERE PAIN (7)

## 2021-05-04 ASSESSMENT — ACTIVITIES OF DAILY LIVING (ADL): DEPENDENT_IADLS:: TRANSPORTATION

## 2021-05-04 NOTE — PATIENT INSTRUCTIONS
Treatment planning:    Recommendations will be written in the providers note for your Primary Care provider (OR other providers in your care team) to review and make changes to your therapies based on their discretion.        Recommended Follow up:      Follow up as needed.       Please call [phone], option #1 to schedule your clinic appointment if you don't already have an appointment scheduled.        To speak with a nurse, schedule/reschedule/cancel a clinic appointment, or request a medication refill call: [phone], option #1.    You can also reach us by [employer]: [email or web address]

## 2021-05-04 NOTE — PROGRESS NOTES
Clinic Care Coordination RN :  Incoming call from the patient with an update after Pain Clinic appointment   Patient left a message that he doesn't have the diagnosis of Chronic regional disease   Report will be sent to Dr Wegener  .  Wound clinic provider states his wound is not looking any better .      Dr Ramirez informed patient he will contact Podiatry providers and check on weight bearing status and a possible boot to wear in the future     CC will follow up in 3-5 business days   [facility]   Echo Varghese RN, Care Coordinator   [facility] and [place]   E-mail [email or web address]   [phone]

## 2021-05-04 NOTE — LETTER
5/4/2021       RE: Erwin Aguilar  [address]  [place] [address]     Dear Colleague,    Thank you for referring your patient, Erwin Aguilar, to the [facility] CLIN[facility] FOR COMPREHENSIVE PAIN MANAGEMENT [place] at [facility]. Please see a copy of my visit note below.    Pain Clinic New Patient Consult Note:    Referring Provider: No ref. provider found   Primary care provider: Wegener, Joel Daniel Irwin.    Erwin Aguilar is a 60 year old y.o. old male who presents to the pain clinic with with discomfort in the right foot    HPI:  Patient Supplied Answers To the [facility] Pain Questionnaire  [facility] -  Patient Entered Questionnaire/Answers 5/4/2021   What number best describes your pain right now:  0 = No pain  to  10 = Worst pain imaginable 7   How would you describe the pain? other   Which of the following worsen your pain? walking, exercise   Which of the following improve or reduce your pain?  lying down, standing, sitting   What number best describes your average pain for the past week:  0 = No pain  to  10 = Worst pain imaginable 7   What number best describes your LOWEST pain in past 24 hours:  0 = No pain  to  10 = Worst pain imaginable 5   What number best describes your WORST pain in past 24 hours:  0 = No pain  to  10 = Worst pain imaginable 10   When is your pain worst? Constant   What non-medicine treatments have you already had for your pain? pain clinic, physical therapy, spinal cord stimulator   Have you tried treating your pain with medication?  No   Are you currently taking medications for your pain? No     Mr. Aguilar is a pleasant 60 years old presenting to the clinic. He has a specific question whether the right foot symptoms are consistent with CRPS. He reports that he feels further orthopedic interventions are being denied as some physicians have diagnosed him with CRPS.     The patient has a long h/o pain. In the last 1990s he underwent back  "surgery. Subsequently he had back surgery in 2009 with Dr. Coy Stein which was complicated by cauda equina. In 2010 he underwent a scs trial and implantation with Dr. Roland Canela at [facility] at the time ( currently [facility]). He states much of the cauda equina symptoms resolved by the time. He was able to walk with a cane. He struggled with sleeping at night as the stimulations were uncomfortable in the supine position.     His Medtronic spinal cord stimulator was replaced with \"restroe\" function and made neuromodulation comfortable for him throughout the day.     As a sequela of the spine surgeries the patient reports developing a right foot drop. He was unable to use the right foot. He had a bone spur that was extremely bothersome. He experienced some difficult times with his own health, wife health problems and moving homes in June 2020. He finally underwent surgery for the bone spur in Nov 2020 and had extreme bleeding and \" bled through the cast\". He has been diagnosed with cirrhosis and was told by his physicians that liver is stable. He reports his recent INRs have been normal. His cast was removed and he was sent home with a bandaid over the surgical wound.     He reports pain in the right foot which resolved with initiation of cannabis. He has swelling in the right foot below the ankle and states that the foot has been swollen to this extent for years. He states that this is not new. He states that he does not have pain or allodynia in the right foot. He also states that his right foot has been cooler than the left for many years. He said the non healing wound is healing well. He does not believe that the color change, temperature change and swelling are new.     His goal is to be able to have further surgery to fix his foot so he is able to walk. He states that Dr. Naranjo recently at [facility] told him that he may have CRPS. He is asking if he has CRPS.      Significant Medical History:   Past Medical " History:   Diagnosis Date     Actinic keratosis     aldara     Anxiety      Cancer (H)     squamous cell skin CA     Cauda equina spinal cord injury (H)      Chronic sinusitis 5-1-16     Depressive disorder      Diastasis recti      Esophageal reflux      Esophageal varices in cirrhosis (H) 4/1/2014    Hospitalized for UGI blee 3/28/14, endoscopy revealed bleeding varices.     Essential hypertension, benign      Intermittent asthma      Mild depression (H)      Mixed hyperlipidemia      Nasal polyps 5-1-16     Other chronic pain      SCCA (squamous cell carcinoma) of skin      Seasonal allergic conjunctivitis      Type II or unspecified type diabetes mellitus without mention of complication, not stated as uncontrolled      Unspecified site of spinal cord injury without evidence of spinal bone injury     due to back surgery          Past Surgical History:  Past Surgical History:   Procedure Laterality Date     BACK SURGERY  August 2009     C APPENDECTOMY  1974     COLONOSCOPY N/A 5/12/2016    Procedure: COMBINED COLONOSCOPY, SINGLE OR MULTIPLE BIOPSY/POLYPECTOMY BY BIOPSY;  Surgeon: Ana Paula Urbina MD;  Location:  GI     ENDOSCOPY UPPER, COLONOSCOPY, COMBINED  10/19/2011    Procedure:COMBINED ENDOSCOPY UPPER, COLONOSCOPY; Upper Endoscopy, Colonoscopy with Polypectomy x4; Surgeon:AMBAR RODRÍGUEZIQ; Location: OR     ENT SURGERY  1-2016    Ongoing since then     ESOPHAGOSCOPY, GASTROSCOPY, DUODENOSCOPY (EGD), COMBINED  3/28/2014    Procedure: COMBINED ESOPHAGOSCOPY, GASTROSCOPY, DUODENOSCOPY (EGD);  EGD, Gastric Biopsies, Esophageal Banding;  Surgeon: Reyna Tovar MD;  Location:  OR     ESOPHAGOSCOPY, GASTROSCOPY, DUODENOSCOPY (EGD), COMBINED  6/9/2014    Procedure: COMBINED ESOPHAGOSCOPY, GASTROSCOPY, DUODENOSCOPY (EGD);  Surgeon: Curtis Mendez MD;  Location:  GI     ESOPHAGOSCOPY, GASTROSCOPY, DUODENOSCOPY (EGD), COMBINED  7/24/2014    Procedure: COMBINED ESOPHAGOSCOPY,  GASTROSCOPY, DUODENOSCOPY (EGD);  Surgeon: Gerard Samaniego MD;  Location: [facility] OR     ESOPHAGOSCOPY, GASTROSCOPY, DUODENOSCOPY (EGD), COMBINED N/A 10/31/2014    Procedure: COMBINED ESOPHAGOSCOPY, GASTROSCOPY, DUODENOSCOPY (EGD);  Surgeon: Gerard Samaniego MD;  Location: [facility] OR     ESOPHAGOSCOPY, GASTROSCOPY, DUODENOSCOPY (EGD), COMBINED N/A 5/12/2016    Procedure: COMBINED ESOPHAGOSCOPY, GASTROSCOPY, DUODENOSCOPY (EGD);  Surgeon: Ana Paula Urbina MD;  Location: [facility] GI     ESOPHAGOSCOPY, GASTROSCOPY, DUODENOSCOPY (EGD), COMBINED N/A 8/2/2018    Procedure: COMBINED ESOPHAGOSCOPY, GASTROSCOPY, DUODENOSCOPY (EGD);  EGD;  Surgeon: Yu Wagner MD;  Location: [facility] GI     HCL SQUAMOUS CELL CARCINOMA AG  10/07    left forearm     HERNIORRHAPHY UMBILICAL  11/8/2012    Procedure: HERNIORRHAPHY UMBILICAL;  Open Umbilical Hernia Repair With Mesh ;  Surgeon: Chase Nicholson MD;  Location: [facility] OR     INSERT STIMULATOR DORSAL COLUMN N/A 6/28/2018    Procedure: INSERT STIMULATOR DORSAL COLUMN;;  Surgeon: Elvis Sauceda MD;  [facility]     neuro stimulator  2010     REMOVE GENERATOR STIMULATOR (LOCATION) N/A 6/28/2018    Procedure: REMOVE GENERATOR STIMULATOR (LOCATION);  Spinal Cord Stimulator and IPG Explant and Re-Implant of SCS System (Leads and IPG);  Surgeon: Elvis Sauceda MD;  [facility]     REPAIR TENDON ACHILLES Right 11/11/2020    Procedure: Right achilles debridement and partial calcaneus excision;  Surgeon: Eh Sandoval MD;  Location: [facility] [place]     SURGICAL HISTORY OF -   1/02    abcess tooth     SURGICAL HISTORY OF -   1999    L4-5 laminectomy, cauda equina syndrome     SURGICAL HISTORY OF -   +    herniated disk repair     TONSILLECTOMY  10 1964     TRANSPOSITION ULNAR NERVE (ELBOW)      right          Family History:  Family History   Problem Relation Age of Onset     Cancer Mother         lung     Breast Cancer Mother       Other Cancer Mother      Cancer Father         esophogeal, GERD     Diabetes Brother         amputation, Type 1     Diabetes Brother      Diabetes Brother      Cancer - colorectal No family hx of        Social History:  Social History     Socioeconomic History     Marital status: Single     Spouse name: Not on file     Number of children: 0     Years of education: Not on file     Highest education level: Not on file   Occupational History     Occupation: sales     Employer: UNEMPLOYED   Social Needs     Financial resource strain: Not on file     Food insecurity     Worry: Not on file     Inability: Not on file     Transportation needs     Medical: No     Non-medical: No   Tobacco Use     Smoking status: Former Smoker     Packs/day: 0.50     Years: 1.00     Pack years: 0.50     Types: Cigarettes     Start date: 10/13/1983     Quit date: 1984     Years since quittin.9     Smokeless tobacco: Never Used   Substance and Sexual Activity     Alcohol use: No     Alcohol/week: 0.0 standard drinks     Comment: a pint of alcohol / day - last drink was 3/28/14     Drug use: Yes     Frequency: 2.0 times per week     Types: Marijuana     Comment: marijuana - occasional     Sexual activity: Not Currently     Partners: Female     Birth control/protection: Abstinence   Lifestyle     Physical activity     Days per week: 0 days     Minutes per session: 0 min     Stress: Not on file   Relationships     Social connections     Talks on phone: Not on file     Gets together: Not on file     Attends Nondenominational service: Not on file     Active member of club or organization: Not on file     Attends meetings of clubs or organizations: Not on file     Relationship status:      Intimate partner violence     Fear of current or ex partner: Not on file     Emotionally abused: Not on file     Physically abused: Not on file     Forced sexual activity: Not on file   Other Topics Concern     Parent/sibling w/ CABG, MI or angioplasty  before 65F 55M? No   Social History Narrative     Not on file     Social History     Social History Narrative     Not on file          Allergies:  Allergies   Allergen Reactions     Levaquin Anaphylaxis and Rash     Swelling in lip and tongue felt thick     Lisinopril Anaphylaxis     Swollen tongue; inability to swallow; drooling; hives; swollen face, neck, angioedema     Acetaminophen      Hx of cirrhosis      Amlodipine      Swelling, hives, possible angioedema       Morphine      b/p dropped and arms went numb  Hypotension     Quinolones Hives     Bactrim [Sulfamethoxazole W/Trimethoprim] Rash       Current Medications:   Current Outpatient Medications   Medication Sig Dispense Refill     ACE/ARB NOT PRESCRIBED, INTENTIONAL, ACE & ARB not prescribed due to Allergy       albuterol (PROAIR HFA/PROVENTIL HFA/VENTOLIN HFA) 108 (90 Base) MCG/ACT inhaler INHALE 2 PUFFS BY MOUTH EVERY 6 HOURS AS NEEDED FOR SHORTNESS OF BREATH/DYSPNEA OR WHEEZING 3 Inhaler 3     ammonium lactate (LAC-HYDRIN) 12 % cream Apply topically 2 times daily as needed for dry skin 385 g 3     baclofen (LIORESAL) 10 MG tablet TAKE 2 TABLETS BY MOUTH THREE TIMES DAILY 180 tablet 11     blood glucose (ACCU-CHEK KARISHMA PLUS) test strip USE TO TEST BLOOD SUGARS ONCE DAILY 100 strip 1     hydrochlorothiazide (HYDRODIURIL) 25 MG tablet TAKE 1 TABLET (25 MG) BY MOUTH DAILY 90 tablet 3     insulin glargine (BASAGLAR KWIKPEN) 100 UNIT/ML pen Inject 26 Units Subcutaneous daily 24 mL 3     insulin pen needle (BD ROSE U/F) 32G X 4 MM miscellaneous Use daily and as directed. 100 each 3     potassium chloride ER (K-TAB) 20 MEQ CR tablet TAKE 1 TABLET (20 MEQ) BY MOUTH DAILY 90 tablet 3     promethazine (PHENERGAN) 25 MG tablet TAKE 1 TABLET (25MG) BY MOUTH THREE TIMES DAILY AS NEEDED FOR NAUSEA. 90 tablet 1     propranolol (INDERAL) 40 MG tablet TAKE 2 TABLETS (80 MG) BY MOUTH 2 TIMES DAILY 360 tablet 3     rosuvastatin (CRESTOR) 10 MG tablet TAKE 1 TABLET BY  MOUTH EVERY DAY 90 tablet 3     rosuvastatin (CRESTOR) 20 MG tablet TAKE 1 TABLET BY MOUTH EVERY DAY 90 tablet 1     sertraline (ZOLOFT) 100 MG tablet TAKE 2 TABLETS BY MOUTH EVERY  tablet 3          Current Pain Medications:  Medications related to Pain Management (From now, onward)    None         Review of Systems:  Review of Systems   All other systems reviewed and are negative.    Physical Exam:     Vitals:    05/04/21 1045   BP: (!) 188/110   Pulse: 76   Resp: 16     General Appearance: No distress, seated comfortably, prefers to keep the right foot elevated over the walker  Mood: Euthymic  HE ENT: Non constricted pupils  Respiratory: Non labored breathing  Skin: No rashes over exposed skin, LE skin is stretched and shinny bilaterally  MS: moves extremities freely against gravity.   There is some erythema around the surgical site in the right foot  The right foot is cooler  Dorsalis pedis pulse +  Neuro: allodynia absent  Gait: ambulates with scooter assistance    Laboratory results:  Recent Labs   Lab Test 04/09/21  0816 11/23/20  1710    141   POTASSIUM 3.9 3.6   CHLORIDE 109 109   CO2 27 25   ANIONGAP 6 7   * 119*   BUN 7 9   CR 0.68 0.65*   [place] 8.9 9.7       CBC RESULTS:   Recent Labs   Lab Test 04/09/21  0816   WBC 6.5   RBC 4.80   HGB 14.1   HCT 40.6   MCV 85   MCH 29.4   MCHC 34.7   RDW 12.6            Imaging:         ASSESSMENT AND PLAN:     Encounter Diagnosis:    Right foot pain  Post surgical changes    Erwin Aguilar is a 60 year old y.o. old male who presents to the pain clinic with right foot discomfort asking if he has CRPS.     I d.w with the patient that CRPS is a diagnosis of exclusion. I looked through his chart and was unable to find any other images of the right leg or comparisons with the left leg.   In todays picture one can see there is some color changes distal to the ankle. There is some swelling on the right foot. He does not have allodynia on todays  examination. The patient denies pain today and says his pain is managed with the scs and medical marijuana. He states there was a period when the scs was turned off. During that time he experienced return of severe chronic right leg pain which was treated by the scs.     CRPS is diagnosed by the [place] criteria:   1.Sensory: Allodynia and hyperalgesia are absent today  2. Vasomotor: There is a differences in temperature and color between the right and left foot.   3. Sudomotor: there is asymmetry in swelling of the right foot. Asymmetry in sweating cannot be commented on today.   4. Motor: I did not perceive significant reduced ROM that would not be expected after his surgery. He is noted to be moving the right foot freely during the appointment.   There are changes in hair, skin and nails which seems to be similar bilateral legs    Overall in today's assessment that patient does not meet criteria for CRPS. CRPS is a diagnosis of exclusion and the swelling changes in the right foot could be related to the surgeries and non healing surgical wound. His hair and skin changes appear on both lower extremities and could partly be due to his long standing diabetes. He does not reports significant pain at this visit. The patient states that he had difficulties with his right foot because of the foot drop. He reports chronically the foot was swollen and cooler compared to the left side.     I have discussed with the patient that he should consider consultation with neurology and see if further test is recommended by my neurology colleagues.     RECOMMENDATIONS:     1. Referral to neurology  2. The patient also had reprogramming session today with medtronic.   All questions and concerns were answered and the patient was satisfied with the visit.     Follow up: as needed    Again, thank you for allowing me to participate in the care of your patient.      Sincerely,    Delphine Garcia MD

## 2021-05-05 ENCOUNTER — TELEPHONE (OUTPATIENT)
Dept: ANESTHESIOLOGY | Facility: CLINIC | Age: 61
End: 2021-05-05

## 2021-05-05 DIAGNOSIS — M79.2 NEUROPATHIC PAIN: Primary | ICD-10-CM

## 2021-05-05 NOTE — TELEPHONE ENCOUNTER
----- Message from Delphine Garcia MD sent at 5/5/2021  2:25 PM CDT -----  Please place a consult to neurology.   VG

## 2021-05-05 NOTE — PROGRESS NOTES
Pain Clinic New Patient Consult Note:    Referring Provider: No ref. provider found   Primary care provider: Wegener, Joel Daniel Irwin.    Erwin Aguilar is a 60 year old y.o. old male who presents to the pain clinic with with discomfort in the right foot    HPI:  Patient Supplied Answers To the  Pain Questionnaire  [facility] -  Patient Entered Questionnaire/Answers 5/4/2021   What number best describes your pain right now:  0 = No pain  to  10 = Worst pain imaginable 7   How would you describe the pain? other   Which of the following worsen your pain? walking, exercise   Which of the following improve or reduce your pain?  lying down, standing, sitting   What number best describes your average pain for the past week:  0 = No pain  to  10 = Worst pain imaginable 7   What number best describes your LOWEST pain in past 24 hours:  0 = No pain  to  10 = Worst pain imaginable 5   What number best describes your WORST pain in past 24 hours:  0 = No pain  to  10 = Worst pain imaginable 10   When is your pain worst? Constant   What non-medicine treatments have you already had for your pain? pain clinic, physical therapy, spinal cord stimulator   Have you tried treating your pain with medication?  No   Are you currently taking medications for your pain? No     Mr. Aguilar is a pleasant 60 years old presenting to the clinic. He has a specific question whether the right foot symptoms are consistent with CRPS. He reports that he feels further orthopedic interventions are being denied as some physicians have diagnosed him with CRPS.     The patient has a long h/o pain. In the last 1990s he underwent back surgery. Subsequently he had back surgery in 2009 with Dr. Coy Stein which was complicated by cauda equina. In 2010 he underwent a scs trial and implantation with Dr. Roland Canela at [facility] at the time ( currently [facility]). He states much of the cauda equina symptoms resolved by the time. He was able to walk with a cane.  "He struggled with sleeping at night as the stimulations were uncomfortable in the supine position.     His Medtronic spinal cord stimulator was replaced with \"restroe\" function and made neuromodulation comfortable for him throughout the day.     As a sequela of the spine surgeries the patient reports developing a right foot drop. He was unable to use the right foot. He had a bone spur that was extremely bothersome. He experienced some difficult times with his own health, wife health problems and moving homes in June 2020. He finally underwent surgery for the bone spur in Nov 2020 and had extreme bleeding and \" bled through the cast\". He has been diagnosed with cirrhosis and was told by his physicians that liver is stable. He reports his recent INRs have been normal. His cast was removed and he was sent home with a bandaid over the surgical wound.     He reports pain in the right foot which resolved with initiation of cannabis. He has swelling in the right foot below the ankle and states that the foot has been swollen to this extent for years. He states that this is not new. He states that he does not have pain or allodynia in the right foot. He also states that his right foot has been cooler than the left for many years. He said the non healing wound is healing well. He does not believe that the color change, temperature change and swelling are new.     His goal is to be able to have further surgery to fix his foot so he is able to walk. He states that Dr. Naranjo recently at [facility] told him that he may have CRPS. He is asking if he has CRPS.      Significant Medical History:   Past Medical History:   Diagnosis Date     Actinic keratosis     aldara     Anxiety      Cancer (H)     squamous cell skin CA     Cauda equina spinal cord injury (H)      Chronic sinusitis 5-1-16     Depressive disorder      Diastasis recti      Esophageal reflux      Esophageal varices in cirrhosis (H) 4/1/2014    Hospitalized for UGI " blee 3/28/14, endoscopy revealed bleeding varices.     Essential hypertension, benign      Intermittent asthma      Mild depression (H)      Mixed hyperlipidemia      Nasal polyps 5-1-16     Other chronic pain      SCCA (squamous cell carcinoma) of skin      Seasonal allergic conjunctivitis      Type II or unspecified type diabetes mellitus without mention of complication, not stated as uncontrolled      Unspecified site of spinal cord injury without evidence of spinal bone injury     due to back surgery          Past Surgical History:  Past Surgical History:   Procedure Laterality Date     BACK SURGERY  August 2009     C APPENDECTOMY  1974     COLONOSCOPY N/A 5/12/2016    Procedure: COMBINED COLONOSCOPY, SINGLE OR MULTIPLE BIOPSY/POLYPECTOMY BY BIOPSY;  Surgeon: Ana Paula Urbina MD;  Location:  GI     ENDOSCOPY UPPER, COLONOSCOPY, COMBINED  10/19/2011    Procedure:COMBINED ENDOSCOPY UPPER, COLONOSCOPY; Upper Endoscopy, Colonoscopy with Polypectomy x4; Surgeon:AMBAR RODRÍGUEZIQ; Location: OR     ENT SURGERY  1-2016    Ongoing since then     ESOPHAGOSCOPY, GASTROSCOPY, DUODENOSCOPY (EGD), COMBINED  3/28/2014    Procedure: COMBINED ESOPHAGOSCOPY, GASTROSCOPY, DUODENOSCOPY (EGD);  EGD, Gastric Biopsies, Esophageal Banding;  Surgeon: Reyna Tovar MD;  Location:  OR     ESOPHAGOSCOPY, GASTROSCOPY, DUODENOSCOPY (EGD), COMBINED  6/9/2014    Procedure: COMBINED ESOPHAGOSCOPY, GASTROSCOPY, DUODENOSCOPY (EGD);  Surgeon: Curtis Mendez MD;  Location:  GI     ESOPHAGOSCOPY, GASTROSCOPY, DUODENOSCOPY (EGD), COMBINED  7/24/2014    Procedure: COMBINED ESOPHAGOSCOPY, GASTROSCOPY, DUODENOSCOPY (EGD);  Surgeon: Gerard Samaniego MD;  Location:  OR     ESOPHAGOSCOPY, GASTROSCOPY, DUODENOSCOPY (EGD), COMBINED N/A 10/31/2014    Procedure: COMBINED ESOPHAGOSCOPY, GASTROSCOPY, DUODENOSCOPY (EGD);  Surgeon: Gerard Samaniego MD;  Location:  OR     ESOPHAGOSCOPY, GASTROSCOPY, DUODENOSCOPY (EGD),  COMBINED N/A 5/12/2016    Procedure: COMBINED ESOPHAGOSCOPY, GASTROSCOPY, DUODENOSCOPY (EGD);  Surgeon: Ana Paula Urbina MD;  [facility] GI     ESOPHAGOSCOPY, GASTROSCOPY, DUODENOSCOPY (EGD), COMBINED N/A 8/2/2018    Procedure: COMBINED ESOPHAGOSCOPY, GASTROSCOPY, DUODENOSCOPY (EGD);  EGD;  Surgeon: Yu Wagner MD;  Location: [facility] GI     HCL SQUAMOUS CELL CARCINOMA AG  10/07    left forearm     HERNIORRHAPHY UMBILICAL  11/8/2012    Procedure: HERNIORRHAPHY UMBILICAL;  Open Umbilical Hernia Repair With Mesh ;  Surgeon: Chase Nicholson MD;  Location: [facility] OR     INSERT STIMULATOR DORSAL COLUMN N/A 6/28/2018    Procedure: INSERT STIMULATOR DORSAL COLUMN;;  Surgeon: Elvis Sauceda MD;  [facility]     neuro stimulator  2010     REMOVE GENERATOR STIMULATOR (LOCATION) N/A 6/28/2018    Procedure: REMOVE GENERATOR STIMULATOR (LOCATION);  Spinal Cord Stimulator and IPG Explant and Re-Implant of SCS System (Leads and IPG);  Surgeon: Elvis Sauceda MD;  [facility]     REPAIR TENDON ACHILLES Right 11/11/2020    Procedure: Right achilles debridement and partial calcaneus excision;  Surgeon: Eh Sandoval MD;  Location: [facility] OR     SURGICAL HISTORY OF -   1/02    abcess tooth     SURGICAL HISTORY OF -   1999    L4-5 laminectomy, cauda equina syndrome     SURGICAL HISTORY OF -   +    herniated disk repair     TONSILLECTOMY  10 1964     TRANSPOSITION ULNAR NERVE (ELBOW)      right          Family History:  Family History   Problem Relation Age of Onset     Cancer Mother         lung     Breast Cancer Mother      Other Cancer Mother      Cancer Father         esophogeal, GERD     Diabetes Brother         amputation, Type 1     Diabetes Brother      Diabetes Brother      Cancer - colorectal No family hx of           Social History:  Social History     Socioeconomic History     Marital status: Single     Spouse name: Not on file     Number of  children: 0     Years of education: Not on file     Highest education level: Not on file   Occupational History     Occupation: sales     Employer: UNEMPLOYED   Social Needs     Financial resource strain: Not on file     Food insecurity     Worry: Not on file     Inability: Not on file     Transportation needs     Medical: No     Non-medical: No   Tobacco Use     Smoking status: Former Smoker     Packs/day: 0.50     Years: 1.00     Pack years: 0.50     Types: Cigarettes     Start date: 10/13/1983     Quit date: 1984     Years since quittin.9     Smokeless tobacco: Never Used   Substance and Sexual Activity     Alcohol use: No     Alcohol/week: 0.0 standard drinks     Comment: a pint of alcohol / day - last drink was 3/28/14     Drug use: Yes     Frequency: 2.0 times per week     Types: Marijuana     Comment: marijuana - occasional     Sexual activity: Not Currently     Partners: Female     Birth control/protection: Abstinence   Lifestyle     Physical activity     Days per week: 0 days     Minutes per session: 0 min     Stress: Not on file   Relationships     Social connections     Talks on phone: Not on file     Gets together: Not on file     Attends Jain service: Not on file     Active member of club or organization: Not on file     Attends meetings of clubs or organizations: Not on file     Relationship status:      Intimate partner violence     Fear of current or ex partner: Not on file     Emotionally abused: Not on file     Physically abused: Not on file     Forced sexual activity: Not on file   Other Topics Concern     Parent/sibling w/ CABG, MI or angioplasty before 65F 55M? No   Social History Narrative     Not on file     Social History     Social History Narrative     Not on file          Allergies:  Allergies   Allergen Reactions     Levaquin Anaphylaxis and Rash     Swelling in lip and tongue felt thick     Lisinopril Anaphylaxis     Swollen tongue; inability to swallow; drooling;  hives; swollen face, neck, angioedema     Acetaminophen      Hx of cirrhosis      Amlodipine      Swelling, hives, possible angioedema       Morphine      b/p dropped and arms went numb  Hypotension     Quinolones Hives     Bactrim [Sulfamethoxazole W/Trimethoprim] Rash       Current Medications:   Current Outpatient Medications   Medication Sig Dispense Refill     ACE/ARB NOT PRESCRIBED, INTENTIONAL, ACE & ARB not prescribed due to Allergy       albuterol (PROAIR HFA/PROVENTIL HFA/VENTOLIN HFA) 108 (90 Base) MCG/ACT inhaler INHALE 2 PUFFS BY MOUTH EVERY 6 HOURS AS NEEDED FOR SHORTNESS OF BREATH/DYSPNEA OR WHEEZING 3 Inhaler 3     ammonium lactate (LAC-HYDRIN) 12 % cream Apply topically 2 times daily as needed for dry skin 385 g 3     baclofen (LIORESAL) 10 MG tablet TAKE 2 TABLETS BY MOUTH THREE TIMES DAILY 180 tablet 11     blood glucose (ACCU-CHEK KARISHMA PLUS) test strip USE TO TEST BLOOD SUGARS ONCE DAILY 100 strip 1     hydrochlorothiazide (HYDRODIURIL) 25 MG tablet TAKE 1 TABLET (25 MG) BY MOUTH DAILY 90 tablet 3     insulin glargine (BASAGLAR KWIKPEN) 100 UNIT/ML pen Inject 26 Units Subcutaneous daily 24 mL 3     insulin pen needle (BD ROSE U/F) 32G X 4 MM miscellaneous Use daily and as directed. 100 each 3     potassium chloride ER (K-TAB) 20 MEQ CR tablet TAKE 1 TABLET (20 MEQ) BY MOUTH DAILY 90 tablet 3     promethazine (PHENERGAN) 25 MG tablet TAKE 1 TABLET (25MG) BY MOUTH THREE TIMES DAILY AS NEEDED FOR NAUSEA. 90 tablet 1     propranolol (INDERAL) 40 MG tablet TAKE 2 TABLETS (80 MG) BY MOUTH 2 TIMES DAILY 360 tablet 3     rosuvastatin (CRESTOR) 10 MG tablet TAKE 1 TABLET BY MOUTH EVERY DAY 90 tablet 3     rosuvastatin (CRESTOR) 20 MG tablet TAKE 1 TABLET BY MOUTH EVERY DAY 90 tablet 1     sertraline (ZOLOFT) 100 MG tablet TAKE 2 TABLETS BY MOUTH EVERY  tablet 3          Current Pain Medications:  Medications related to Pain Management (From now, onward)    None         Review of Systems:  Review  of Systems   All other systems reviewed and are negative.    Physical Exam:     Vitals:    05/04/21 1045   BP: (!) 188/110   Pulse: 76   Resp: 16     General Appearance: No distress, seated comfortably, prefers to keep the right foot elevated over the walker  Mood: Euthymic  HE ENT: Non constricted pupils  Respiratory: Non labored breathing  Skin: No rashes over exposed skin, LE skin is stretched and shinny bilaterally  MS: moves extremities freely against gravity.   There is some erythema around the surgical site in the right foot  The right foot is cooler  Dorsalis pedis pulse +  Neuro: allodynia absent  Gait: ambulates with scooter assistance    Laboratory results:  Recent Labs   Lab Test 04/09/21  0816 11/23/20  1710    141   POTASSIUM 3.9 3.6   CHLORIDE 109 109   CO2 27 25   ANIONGAP 6 7   * 119*   BUN 7 9   CR 0.68 0.65*   [place] 8.9 9.7       CBC RESULTS:   Recent Labs   Lab Test 04/09/21  0816   WBC 6.5   RBC 4.80   HGB 14.1   HCT 40.6   MCV 85   MCH 29.4   MCHC 34.7   RDW 12.6            Imaging:         ASSESSMENT AND PLAN:     Encounter Diagnosis:    Right foot pain  Post surgical changes    Erwin Aguilar is a 60 year old y.o. old male who presents to the pain clinic with right foot discomfort asking if he has CRPS.     I d.w with the patient that CRPS is a diagnosis of exclusion. I looked through his chart and was unable to find any other images of the right leg or comparisons with the left leg.   In todays picture one can see there is some color changes distal to the ankle. There is some swelling on the right foot. He does not have allodynia on todays examination. The patient denies pain today and says his pain is managed with the scs and medical marijuana. He states there was a period when the scs was turned off. During that time he experienced return of severe chronic right leg pain which was treated by the scs.     CRPS is diagnosed by the budapest criteria:   1.Sensory: Allodynia  and hyperalgesia are absent today  2. Vasomotor: There is a differences in temperature and color between the right and left foot.   3. Sudomotor: there is asymmetry in swelling of the right foot. Asymmetry in sweating cannot be commented on today.   4. Motor: I did not perceive significant reduced ROM that would not be expected after his surgery. He is noted to be moving the right foot freely during the appointment.   There are changes in hair, skin and nails which seems to be similar bilateral legs    Overall in today's assessment that patient does not meet criteria for CRPS. CRPS is a diagnosis of exclusion and the swelling changes in the right foot could be related to the surgeries and non healing surgical wound. His hair and skin changes appear on both lower extremities and could partly be due to his long standing diabetes. He does not reports significant pain at this visit. The patient states that he had difficulties with his right foot because of the foot drop. He reports chronically the foot was swollen and cooler compared to the left side.     I have discussed with the patient that he should consider consultation with neurology and see if further test is recommended by my neurology colleagues.     RECOMMENDATIONS:     1. Referral to neurology  2. The patient also had reprogramming session today with [employer].   All questions and concerns were answered and the patient was satisfied with the visit.     Follow up: as needed                                                                                              Answers for HPI/ROS submitted by the patient on 5/4/2021   General Symptoms: No  Skin Symptoms: No  HENT Symptoms: No  EYE SYMPTOMS: No  HEART SYMPTOMS: No  LUNG SYMPTOMS: No  INTESTINAL SYMPTOMS: No  URINARY SYMPTOMS: No  REPRODUCTIVE SYMPTOMS: No  SKELETAL SYMPTOMS: No  BLOOD SYMPTOMS: No  NERVOUS SYSTEM SYMPTOMS: No  MENTAL HEALTH SYMPTOMS: No

## 2021-05-06 PROBLEM — Z98.890 H/O FOOT SURGERY: Status: ACTIVE | Noted: 2021-05-06

## 2021-05-06 PROBLEM — M21.6X1: Status: ACTIVE | Noted: 2021-05-06

## 2021-05-06 PROBLEM — M65.28 CALCIFIC ACHILLES TENDINITIS: Status: ACTIVE | Noted: 2021-05-06

## 2021-05-06 PROBLEM — M76.60 CALCIFIC ACHILLES TENDINITIS: Status: ACTIVE | Noted: 2021-05-06

## 2021-05-07 ENCOUNTER — MYC MEDICAL ADVICE (OUTPATIENT)
Dept: FAMILY MEDICINE | Facility: CLINIC | Age: 61
End: 2021-05-07

## 2021-05-08 ENCOUNTER — HEALTH MAINTENANCE LETTER (OUTPATIENT)
Age: 61
End: 2021-05-08

## 2021-05-10 ENCOUNTER — MYC MEDICAL ADVICE (OUTPATIENT)
Dept: FAMILY MEDICINE | Facility: CLINIC | Age: 61
End: 2021-05-10

## 2021-05-14 ENCOUNTER — PATIENT OUTREACH (OUTPATIENT)
Dept: CARE COORDINATION | Facility: CLINIC | Age: 61
End: 2021-05-14

## 2021-05-14 DIAGNOSIS — M25.561 RIGHT KNEE PAIN: Primary | ICD-10-CM

## 2021-05-14 ASSESSMENT — ACTIVITIES OF DAILY LIVING (ADL): DEPENDENT_IADLS:: TRANSPORTATION

## 2021-05-14 NOTE — PROGRESS NOTES
Clinic Care Coordination Contact  [facility]/Voicemail    Referral Source: Care Team  Clinical Data: Care Coordinator Outreach  Outreach attempted x 1.  Left message on patient's voicemail with call back information and requested return call.  Plan:. Care Coordinator will try to reach patient again in 10 business days.  [facility]   Echo Varghese RN, Care Coordinator   [facility] and [place]   E-mail [email or web address]   [phone]

## 2021-05-14 NOTE — LETTER
[facility] CARE COORDINATION  [address]  [facility] [address]  May 18, 2021    Erwin Aguilar  [address]  [place] [address]      Dear Zoya Hood is an updated Complex Care Plan for your continued enrollment in Care Coordination. Please let us know if you have additional questions, concerns, or goals that we can assist with.    Sincerely,    [facility]   Echo Varghese RN, Care Coordinator   [facility] and [place]   E-mail [email or web address]   [phone]     [facility]  Complex Care Plan  About Me:    Patient Name:  Erwin Aguilar    YOB: 1960  Age:         60 year old   [place] MRN:    [id number] Telephone Information:  Home Phone [phone]   Mobile [phone]       Address:  [address] [place] [address] Email address:  [email or web address]      Emergency Contact(s)    Name Relationship Lgl Grd Work Phone Home Phone Mobile Phone   1. LUISBOOMBRETT Roommate   [phone]           Primary language:  English     needed? No   [place] [facility]:  [phone] op. 1  Other communication barriers: Physical impairment  Preferred Method of Communication:  Pancho  Current living arrangement: I live in assisted living  Mobility Status/ Medical Equipment: Dependent/Assisted by Another    Health Maintenance  Health Maintenance Reviewed: Not assessed    My Access Plan  Medical Emergency 911   Primary Clinic Line [facility] [phone]   24 Hour Appointment Line [phone] or  [phone] ([phone]) (toll-free)   24 Hour Nurse Line [phone] (toll-free)   Preferred Urgent Care Other   Preferred Hospital [facility]  [phone]   Preferred Pharmacy [facility] [place], [place] - [address]     Behavioral Health Crisis Line The National Suicide Prevention  [facility] at 1-919.640.2222 or 911       My Care Team Members  Patient Care Team       Relationship Specialty Notifications Start End    Wegener, Joel Daniel Irwin, MD PCP - General Family Practice  8/4/15     Phone: [phone] Fax: [phone]         [address] [place] [address]    Kathleen Sen MD MD Family Medicine - Sports Medicine  7/14/15     Phone: [phone] Fax: [phone]         [address] [place] [address]    Elvis Hamilton MD MD Family [facility] - Sports [facility]  10/15/15     Phone: [phone] Fax: [phone]         [address] [address]    Kimberly Ramirez MD MD Gastroenterology  1/21/16     Phone: [phone] Fax: [phone]         [address] [place] [address]    Victor M Anaya DPM MD Podiatry  10/26/17     Phone: [phone] Fax: [phone]         [address] [place] [address]    Elvis Sauceda MD MD Anesthesiology  11/29/17     Pager: [phone]         Kimberly Ramirez MD Assigned Gastroenterology Provider   10/23/20     Phone: [phone] Fax: [phone]         [address] [place] [address]    Victor M Anaya DPM Assigned Musculoskeletal Provider   10/23/20     Phone: [phone] Fax: [phone]         [address] [place] [address]    Echo Varghese, RN Lead Care Coordinator Primary Care - CC Admissions 12/28/20     Phone: [phone]         Wegener, Joel Daniel Irwin, MD Assigned PCP   3/7/21     Phone: [phone] Fax: [phone]         [facility] [address]    Breanna Sahni, RN Registered Nurse Wound Care  4/27/21     Phone: [phone] Pager: [phone]         [address] [facility] [address]            My Care Plans  Self Management and Treatment Plan  Goals and (Comments)  Goals        General    Pain management (pt-stated)      Notes - Note edited  5/3/2021 11:47 AM by Echo Varghese, RN    Goal Statement: I will decrease my heel pain in the next 6 months   Date Goal set: 4/5/2021  Barriers: slow healing open wound  Strengths: Positive attitude   Date to Achieve By: 10/5/2021  Patient expressed understanding of goal: Yes  Action steps to achieve this goal:  1. I will future appointments with Dr Sandoval and Dr Anaya /Podiatrist /Surgeon   2. I will keep my appointment with Dr Sweet /[facility] 4/14/2021  3. I will use my electric bike to increase mobility outside   4. I will take my CBD oil  medication as directed   3. Care coordinator will follow up in 2-4 weeks              Action Plans on File:    Depression        Advance Care Plans/Directives Type: None       My Medical and Care Information  Problem List   Patient Active Problem List   Diagnosis     Generalized anxiety disorder     Type 2 diabetes, HbA1c goal < 7% (H)     Hyperlipidemia LDL goal <100     Hypertension goal BP (blood pressure) < 140/90     Major depressive disorder, recurrent episode, mild (H)     Chronic pain syndrome     GERD (gastroesophageal reflux disease)     Abnormal liver function tests     Health Care Home     Abnormal EMG     Hernia     Prolonged QT interval     Diastasis recti     Hypokalemia     Cauda equina syndrome with neurogenic bladder (H)     Wound infection     Cellulitis     Nausea without vomiting     Atopic dermatitis     Muscle spasms of Upper extremity     Edema of left lower extremity     Esophageal varices in cirrhosis (H)     Cirrhosis of liver (H)     Anemia due to blood loss, acute     Skin infection     Superficial thrombophlebitis of arm     Cellulitis of hand     Ganglion cyst     Dry skin dermatitis     Moderate persistent asthma     Open wound of right heel     Fall     Closed fracture of right patella     Right knee pain     Alcoholic cirrhosis of liver without ascites (H)     Lumbosacral radiculopathy     Calculus of gallbladder  without cholecystitis without obstruction     Type 2 diabetes mellitus without complication, with long-term current use of insulin (H)     Wound, open, buttock, right     Type 2 diabetes mellitus with diabetic polyneuropathy, with long-term current use of insulin (H)     Benign neoplasm of skin of trunk, except scrotum     Hematemesis with nausea     Osteoarthritis of lumbar spine, unspecified spinal osteoarthritis complication status     S/P insertion of spinal cord stimulator     Gastroparesis     Alcoholism in remission (H)     Right ankle pain     Acquired calcaneus deformity of right ankle     H/O foot surgery     Calcific Achilles tendinitis      Current Medications and Allergies:    Current Outpatient Medications   Medication     ACE/ARB NOT PRESCRIBED, INTENTIONAL,     albuterol (PROAIR HFA/PROVENTIL HFA/VENTOLIN HFA) 108 (90 Base) MCG/ACT inhaler     ammonium lactate (LAC-HYDRIN) 12 % cream     baclofen (LIORESAL) 10 MG tablet     blood glucose (ACCU-CHEK KARISHMA PLUS) test strip     hydrochlorothiazide (HYDRODIURIL) 25 MG tablet     insulin glargine (BASAGLAR KWIKPEN) 100 UNIT/ML pen     insulin pen needle (BD ROSE U/F) 32G X 4 MM miscellaneous     potassium chloride ER (K-TAB) 20 MEQ CR tablet     promethazine (PHENERGAN) 25 MG tablet     propranolol (INDERAL) 40 MG tablet     rosuvastatin (CRESTOR) 10 MG tablet     rosuvastatin (CRESTOR) 20 MG tablet     sertraline (ZOLOFT) 100 MG tablet     No current facility-administered medications for this visit.        Care Coordination Start Date: 12/28/2020   Frequency of Care Coordination: 2 weeks   Form Last Updated: 05/18/2021

## 2021-05-16 NOTE — PROGRESS NOTES
Erwin is a 60 year old who is being evaluated via a billable video visit.      How would you like to obtain your AVS? MyChart  If the video visit is dropped, the invitation should be resent by: Text to cell phone:    Will anyone else be joining your video visit? No      Video Start Time: 1:11    ASSESSMENT AND PLAN  1. Open wound of right heel, subsequent encounter  1:11-1:33    Very good mtg.     Pain much improved with medical cannabis.   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   Having appointment with wound nurse he has worked with in past next week.     Has pain managemnet appt for CRPS and also back pump management may 4th (had to be re-scheduled).     Current issues:  1.  Unclear how much weight can put on foot with wound still.   2.  Even if wound heals foot anatomy not stable due to bones/ligaments, heel essentially not intact (his understanding is that calcaneous is destroyed) Unsure how much weight can put on.   3.  Multiple surgeons would not want to operate with CRPS  4.  transportation barriers being homebound now.       Plan:  1.  I will get second opinion note from [facility]  2.  I will order home physical therapy assessment  3.  I will disucss with previous providers (dr. Sandoval/inez) re: once wound heals how much weight bearing?  4.  I will make orthotics referral to help determine if he could start to be weight bearing with a lower leg prosthetic.       2. Lumbosacral radiculopathy      3. Calcific Achilles tendinitis      4. Type 2 diabetes mellitus with diabetic polyneuropathy, with long-term current use of insulin (H)      5. Chronic pain syndrome  No longer needing opiods.     6. H/O foot surgery      7. Acquired calcaneus deformity of right ankle                   BMI:   Estimated body mass index is 28.21 kg/m  as calculated from the following:    Height as of 1/19/21: 1.829 m (6').    Weight as of 1/19/21: 94.3 kg (208 lb).           No follow-ups on file.    Joel Daniel Wegener, MD  M  [facility]    JOHANN Hood is a 60 year old who presents for the following health issues:  Foot disability with failed achilles surgery and chronic wound.     providsional diagnosis crps well treated with medical cannabis at this point.      Frustrated unable to put together a clear plan to be able to use foot/walk again.            [unfilled]     [unfilled]   [unfilled]          Vitals:  No vitals were obtained today due to virtual visit.    [unfilled]   GENERAL: Healthy, alert and no distress  EYES: Eyes grossly normal to inspection.  No discharge or erythema, or obvious scleral/conjunctival abnormalities.  RESP: No audible wheeze, cough, or visible cyanosis.  No visible retractions or increased work of breathing.    SKIN: Visible skin clear. No significant rash, abnormal pigmentation or lesions.  NEURO: Cranial nerves grossly intact.  Mentation and speech appropriate for age.  PSYCH: Mentation appears normal, affect normal/bright, judgement and insight intact, normal speech and appearance well-groomed.          [unfilled]     Video-Visit Details    Type of service:  Video Visit    Video End Time:1:33    Originating Location (pt. Location): Home    Distant Location (provider location):  [facility]     Platform used for Video Visit: AmWell Joel Wegener,MD

## 2021-05-18 ENCOUNTER — PATIENT OUTREACH (OUTPATIENT)
Dept: NURSING | Facility: CLINIC | Age: 61
End: 2021-05-18
Payer: MEDICARE

## 2021-05-18 DIAGNOSIS — M25.561 RIGHT KNEE PAIN: Primary | ICD-10-CM

## 2021-05-18 ASSESSMENT — ACTIVITIES OF DAILY LIVING (ADL): DEPENDENT_IADLS:: TRANSPORTATION

## 2021-05-18 NOTE — PROGRESS NOTES
"Clinic Care Coordination Contact    Follow Up Progress Note   12/23/2021 PCP referral   Reason for Referral: Financial Support: Medication Affordability  and dressing cares, Patient/Caregiver Support: Resources for Support, Resources for Transportation      Additional pertinent details:  Patient is a 60M with a history of a SCI and subsequent disability. Limited ability to attend clinic appointments, also partially responsible for caring for his wife, who is also recently disabled.  He has an open, draining wound on his foot and has been unable to get dressing supplies.  Appreciate any assistance you can provide for transportation, financial counseling, and community resources to assist him in his care.       Assessment:   1. Patient is at his \"wits end \"  2. Patient has a MRI 6/1/2021 of his right foot  Dr Rosalio JAY will give the patient the final MRI results  3. Patient is discouraged due to he has been told he is not a surgical candidate and will walk again in the future  4. Patient has been told his leg needs to be so infected or bloody to have any surgery  Patient wants his foot amputated so he can get on with his life.  5. Patient uses a wheelchair in the home and a knee scooter and electric bike to get around  6. Just sent in the [employer] application  7. Patient states he has been using CBD oil and it has improved pain and the heel is healed  8. Patient is requesting a letter from PCP stating what is the expectation for level of activity and needs a plan to get to that level    9. Patient has fired Dr Sandoval and Dr Anaya [facility]   10. Patient has a bone spur on the other food and was told to wear opened back shoes   11. Patient has foot drop and needs to manually  his foot     Goals addressed this encounter:   Goals Addressed                 This Visit's Progress      Pain management (pt-stated)   50%     Goal Statement: I will decrease my heel pain in the next 6 months   Date Goal set: " 4/5/2021  Barriers: slow healing open wound  Strengths: Positive attitude   Date to Achieve By: 10/5/2021  Patient expressed understanding of goal: Yes  Action steps to achieve this goal:  1. I will future appointments with Dr Sandoval and Dr Anaya /Podiatrist /Surgeon   2. I will keep my appointment with Dr Sweet /[facility] 4/14/2021  3. I will use my electric bike to increase mobility outside   4. I will take my CBD oil  medication as directed   3. Care coordinator will follow up in 2-4 weeks            Intervention/Education provided during outreach: CC RN  available for support ,questions or concerns      Outreach Frequency: 2 weeks    Plan:   Patient will call CC RN with an update after he gets results from the MRI scheduled 6/1/2021    [facility]   Echo Varghese RN, Care Coordinator   [facility] and [place]   E-mail [email or web address]   [phone]

## 2021-06-01 ENCOUNTER — MYC MEDICAL ADVICE (OUTPATIENT)
Dept: FAMILY MEDICINE | Facility: CLINIC | Age: 61
End: 2021-06-01

## 2021-06-01 DIAGNOSIS — J45.40 MODERATE PERSISTENT ASTHMA WITHOUT COMPLICATION: ICD-10-CM

## 2021-06-01 DIAGNOSIS — M21.6X1 ACQUIRED CALCANEUS DEFORMITY OF RIGHT ANKLE: Primary | ICD-10-CM

## 2021-06-01 DIAGNOSIS — S91.301D OPEN WOUND OF RIGHT HEEL, SUBSEQUENT ENCOUNTER: ICD-10-CM

## 2021-06-02 ENCOUNTER — RECORDS - HEALTHEAST (OUTPATIENT)
Dept: ADMINISTRATIVE | Facility: CLINIC | Age: 61
End: 2021-06-02

## 2021-06-02 DIAGNOSIS — R11.0 NAUSEA WITHOUT VOMITING: ICD-10-CM

## 2021-06-02 DIAGNOSIS — M62.838 MUSCLE SPASMS OF BOTH LOWER EXTREMITIES: ICD-10-CM

## 2021-06-02 DIAGNOSIS — M62.830 BACK MUSCLE SPASM: ICD-10-CM

## 2021-06-02 DIAGNOSIS — K31.84 GASTROPARESIS: ICD-10-CM

## 2021-06-03 RX ORDER — BACLOFEN 10 MG/1
TABLET ORAL
Qty: 180 TABLET | Refills: 8 | Status: SHIPPED | OUTPATIENT
Start: 2021-06-03 | End: 2022-04-22

## 2021-06-03 RX ORDER — PROMETHAZINE HYDROCHLORIDE 25 MG/1
TABLET ORAL
Qty: 90 TABLET | Refills: 1 | Status: SHIPPED | OUTPATIENT
Start: 2021-06-03 | End: 2021-07-29

## 2021-06-03 NOTE — TELEPHONE ENCOUNTER
Baclofen:  Routing refill request to provider for review/approval because:  Drug not on the [facility] refill protocol     Phenergan:  Prescription approved per [facility] Refill Protocol.    Randi BARRIOS RN

## 2021-06-07 ENCOUNTER — MYC MEDICAL ADVICE (OUTPATIENT)
Dept: FAMILY MEDICINE | Facility: CLINIC | Age: 61
End: 2021-06-07

## 2021-06-09 RX ORDER — ALBUTEROL SULFATE 90 UG/1
AEROSOL, METERED RESPIRATORY (INHALATION)
Qty: 54 G | Refills: 3 | Status: SHIPPED | OUTPATIENT
Start: 2021-06-09 | End: 2022-06-20

## 2021-06-11 ENCOUNTER — MYC MEDICAL ADVICE (OUTPATIENT)
Dept: FAMILY MEDICINE | Facility: CLINIC | Age: 61
End: 2021-06-11

## 2021-06-11 ENCOUNTER — PATIENT OUTREACH (OUTPATIENT)
Dept: NURSING | Facility: CLINIC | Age: 61
End: 2021-06-11
Payer: MEDICARE

## 2021-06-11 ENCOUNTER — DOCUMENTATION ONLY (OUTPATIENT)
Dept: ORTHOPEDICS | Facility: CLINIC | Age: 61
End: 2021-06-11

## 2021-06-11 DIAGNOSIS — S91.301A OPEN WOUND OF RIGHT HEEL: Primary | ICD-10-CM

## 2021-06-11 ASSESSMENT — ACTIVITIES OF DAILY LIVING (ADL): DEPENDENT_IADLS:: TRANSPORTATION

## 2021-06-11 NOTE — PROGRESS NOTES
S: Pt seen at Main lab with reports of wanting a devise to lend stability to his RT Ankle since he has no Achilles tendon on that side and a scar near the calcaneal tuberosity that is open. O: I see his EPIC order for FOs or prosthetic for Achilles tendon. I hear his unclear description and his frustration and pain and the instability at his RT ankle. I see the scar/scab at the calcaneal tuberosity. I see he rode his electric bicycle today to his appt. A: We talked and then tried a Blue Rocker AFO ( Old style) and he loved it and said it reduced his fear of walking and made it hurt much less when ambulating. We talked about appropriate shoes and how the brace works and use and care instructions. I also supplied the manufacturers instructions for him to take with him. He was cautioned that this brace would only restrict motion but would not eliminate motion in the RT ankle. He seemed to understand. We tried to fit a heel wedge in the shoe to allow more plantarflexion when ambulating but he rejected the attempt. P: FU PRN. I also left open the ability to try a heel wedge in the future if he will allow it. G: The goal is to reduce pain and lend some stability to his RT ankle with the compromised Achilles tendon.  Electronically signed Momo Hunt CPO, LPO.

## 2021-06-11 NOTE — PROGRESS NOTES
Clinic Care Coordination Contact    Follow Up Progress Note   PCP referral 12/23/2020  Reason for Referral: Financial Support: Medication Affordability  and dressing cares, Patient/Caregiver Support: Resources for Support, Resources for Transportation     Additional pertinent details:  Patient is a 60M with a history of a SCI and subsequent disability. Limited ability to attend clinic appointments, also partially responsible for caring for his wife, who is also recently disabled.  He has an open, draining wound on his foot and has been unable to get dressing supplies.  Appreciate any assistance you can provide for transportation, financial counseling, and community resources to assist him in his care.      Assessment: Open area on heal has resolved.  Patient uses an electric bike and knee scooter.  Patient just had a brace made in Orthodics to support leg and heel.  Patient has been using the brace for only 4 hours and is getting to know how he can walk  Light weight bearing on foot and is using a cane     Goals addressed this encounter:   Goals Addressed                 This Visit's Progress      COMPLETED: Pain management (pt-stated)   90%     Goal Statement: I will decrease my heel pain in the next 6 months   Date Goal set: 4/5/2021  Barriers: slow healing open wound  Strengths: Positive attitude   Date to Achieve By: 10/5/2021  Patient expressed understanding of goal: Yes  Action steps to achieve this goal:  1. I will future appointments with Dr Sandoval and Dr Anaya /Podiatrist /Surgeon   2. I will keep my appointment with Dr Sweet /[facility] 4/14/2021  3. I will use my electric bike to increase mobility outside   4. I will take my CBD oil  medication as directed   3. Care coordinator will follow up in 2-4 weeks              Intervention/Education provided during outreach: CC RN informed patient Genoveva Williamson is the new CC RN and to call in the future for any needs.  Patient agrees with the paln           Plan:   No unmet needs, no further care coordination needed at this time   Graduation letter sent via My Chart   [facility]   Echo Varghese RN, Care Coordinator   [facility] and [place]   E-mail [email or web address]   [phone]

## 2021-06-11 NOTE — LETTER
[facility] CARE COORDINATION  [address]  [facility] [address]  June 11, 2021    Erwin Aguilar  [address] [place] [address]    Dear Erwin,  Your Care Team congratulates you on your journey to maintain wellness. This document will help guide you on your journey to maintain a healthy lifestyle.  You can use this to help you overcome any barriers you may encounter.  If you should have any questions or concerns, you can contact the members of your Care Team or contact your Primary Care Clinic for assistance.     Health Maintenance  Health Maintenance Reviewed: Due/Overdue   Health Maintenance Due   Topic Date Due     ADVANCE CARE PLANNING  Never done     HIV SCREENING  Never done     ZOSTER IMMUNIZATION (1 of 2) Never done     MEDICARE ANNUAL WELLNESS VISIT  04/26/2014     ASTHMA ACTION PLAN  09/17/2019     ASTHMA CONTROL TEST  02/25/2020     EYE EXAM  06/13/2020     DIABETIC FOOT EXAM  10/08/2020       My Access Plan  Medical Emergency 911   Primary Clinic Line [facility] - [phone]   24 Hour Appointment Line [phone] or  [phone]) (toll-free)   24 Hour Nurse Line [phone] (toll-free)   Preferred Urgent Care Other   [facility]  [phone]   Preferred Pharmacy [facility] [place], [place] - [address]     Behavioral Health Crisis Line The National Suicide Prevention Lifeline at 1-438.249.9080 or 911     My Care Team Members  Patient Care Team       Relationship Specialty Notifications Start End    Wegener, Joel Daniel Irwin, MD PCP - General Family Practice  8/4/15     Phone: [phone] Fax: [phone]         [address] [facility] [address]    Kathleen Sen MD MD Family Medicine - Sports Medicine  7/14/15     Phone: [phone] Fax: [phone]         [address] [facility] [address]    Elvis Hamilton MD MD  Family Medicine - Sports Medicine  10/15/15     Phone: [phone] Fax: [phone]         [facility] [address] [facility] [address]    Kimberly Ramirez MD MD Gastroenterology  1/21/16     Phone: [phone] Fax: [phone]         [address] [place] [address]    Victor M Anaya DPM MD Podiatry  10/26/17     Phone: [phone] Fax: [phone]         [address] [place] [address]    Elvis Sauceda MD MD Anesthesiology  11/29/17     Pager: [phone]         Kimberly Ramirez MD Assigned Gastroenterology Provider   10/23/20     Phone: [phone] Fax: [phone]         [address] [place] [address]    Victor M Anaya DPM Assigned Musculoskeletal Provider   10/23/20     Phone: [phone] Fax: [phone]         [address] [place] [address]    Wegener, Joel Daniel Irwin, MD Assigned PCP   3/7/21     Phone: [phone] Fax: [phone]         [facility] [address]    Breanna Sahni, RN Registered Nurse Wound Care  4/27/21     Phone: [phone] Pager: [phone]         [address] [place] [address]              Goals       COMPLETED: Home care (pt-stated)      Goal Statement: I would like getting help with home care, wound care in the next month.   Date Goal set: 12/28/2020  Barriers: lack of openings for home care   Strengths: asking for help   Date to Achieve By: 1/28/2021  Patient expressed understanding of goal: yes   Action steps to achieve this goal:  1. I will wait for another home care to call out to me.   2. I will talk with my orthopedic clinic            COMPLETED: Medical (pt-stated)      Goal Statement: I would like help getting wound dressing supplies in the next month.   Date Goal set: 12/28/2020  Barriers: transportation concerns.   Strengths: advocating for self   Date to Achieve By: 1/28/2021  Patient expressed  understanding of goal: yes  Action steps to achieve this goal:  1. I will wait to see if [facility] delivers any supplies.   2. I will contact CC if I am in need of getting further help             COMPLETED: Pain management (pt-stated)      Goal Statement: I will decrease my heel pain in the next 6 months   Date Goal set: 4/5/2021  Barriers: slow healing open wound  Strengths: Positive attitude   Date to Achieve By: 10/5/2021  Patient expressed understanding of goal: Yes  Action steps to achieve this goal:  1. I will future appointments with Dr Sandoval and Dr Anaya /Podiatrist /Surgeon   2. I will keep my appointment with Dr Sweet /[facility] 4/14/2021  3. I will use my electric bike to increase mobility outside   4. I will take my CBD oil  medication as directed   3. Care coordinator will follow up in 2-4 weeks              Advance Care Plans/Directives Type:      We notice that you do not have an Advance Directive on file. Upon completion of your Health Care Directive, please bring a copy with you to your next office visit.    It has been your Clinic Care Team's pleasure to work with you on your goals.    Regards,  [facility]   Echo Varghese RN, Care Coordinator   [facility] and [place]   E-mail [email or web address]   [phone]   Your Clinic Care Team

## 2021-06-22 ENCOUNTER — MYC MEDICAL ADVICE (OUTPATIENT)
Dept: FAMILY MEDICINE | Facility: CLINIC | Age: 61
End: 2021-06-22

## 2021-06-25 ENCOUNTER — TELEPHONE (OUTPATIENT)
Dept: FAMILY MEDICINE | Facility: CLINIC | Age: 61
End: 2021-06-25

## 2021-06-25 NOTE — TELEPHONE ENCOUNTER
Reason for Call:  Form, our goal is to have forms completed with 72 hours, however, some forms may require a visit or additional information.    Type of letter, form or note:  diabetics supplies     Who is the form from?: [facility] [employer] [facility] (if other please explain)    Where did the form come from: form was faxed in    What clinic location was the form placed at?: [facility]    Where the form was placed: wegener  Box/Folder    What number is listed as a contact on the form?:  Fax [phone]       Additional comments:     Call taken on 6/25/2021 at 9:30 AM by Richard Alcantar

## 2021-06-29 ENCOUNTER — TRANSFERRED RECORDS (OUTPATIENT)
Dept: HEALTH INFORMATION MANAGEMENT | Facility: CLINIC | Age: 61
End: 2021-06-29

## 2021-06-29 ENCOUNTER — MEDICAL CORRESPONDENCE (OUTPATIENT)
Dept: HEALTH INFORMATION MANAGEMENT | Facility: CLINIC | Age: 61
End: 2021-06-29

## 2021-08-22 ENCOUNTER — MYC MEDICAL ADVICE (OUTPATIENT)
Dept: FAMILY MEDICINE | Facility: CLINIC | Age: 61
End: 2021-08-22

## 2021-08-22 DIAGNOSIS — E11.9 TYPE 2 DIABETES MELLITUS WITHOUT COMPLICATION, WITH LONG-TERM CURRENT USE OF INSULIN (H): ICD-10-CM

## 2021-08-22 DIAGNOSIS — R11.0 NAUSEA WITHOUT VOMITING: ICD-10-CM

## 2021-08-22 DIAGNOSIS — K31.84 GASTROPARESIS: ICD-10-CM

## 2021-08-22 DIAGNOSIS — Z79.4 TYPE 2 DIABETES MELLITUS WITHOUT COMPLICATION, WITH LONG-TERM CURRENT USE OF INSULIN (H): ICD-10-CM

## 2021-08-23 ENCOUNTER — VIRTUAL VISIT (OUTPATIENT)
Dept: GASTROENTEROLOGY | Facility: CLINIC | Age: 61
End: 2021-08-23
Payer: MEDICARE

## 2021-08-23 VITALS
WEIGHT: 212 LBS | DIASTOLIC BLOOD PRESSURE: 76 MMHG | HEIGHT: 72 IN | BODY MASS INDEX: 28.71 KG/M2 | SYSTOLIC BLOOD PRESSURE: 137 MMHG

## 2021-08-23 DIAGNOSIS — K70.30 ALCOHOLIC CIRRHOSIS OF LIVER WITHOUT ASCITES (H): Primary | ICD-10-CM

## 2021-08-23 DIAGNOSIS — R11.0 NAUSEA: ICD-10-CM

## 2021-08-23 PROCEDURE — 99215 OFFICE O/P EST HI 40 MIN: CPT | Mod: 95 | Performed by: INTERNAL MEDICINE

## 2021-08-23 RX ORDER — PROMETHAZINE HYDROCHLORIDE 25 MG/1
TABLET ORAL
Qty: 90 TABLET | Refills: 0 | Status: SHIPPED | OUTPATIENT
Start: 2021-08-23 | End: 2021-09-23

## 2021-08-23 RX ORDER — INSULIN GLARGINE 100 [IU]/ML
26 INJECTION, SOLUTION SUBCUTANEOUS DAILY
Qty: 24 ML | Refills: 0 | Status: SHIPPED | OUTPATIENT
Start: 2021-08-23 | End: 2021-12-10

## 2021-08-23 ASSESSMENT — MIFFLIN-ST. JEOR: SCORE: 1804.63

## 2021-08-23 ASSESSMENT — PAIN SCALES - GENERAL: PAINLEVEL: WORST PAIN (10)

## 2021-08-23 NOTE — TELEPHONE ENCOUNTER
FLORINDA,    Phenergan:    Routing refill request to provider for review/approval because:  Drug not on the [facility] refill protocol   Virtual visit 4/27/21.    Thanks,  Isela Villar RN

## 2021-08-23 NOTE — TELEPHONE ENCOUNTER
DE,    Zofran was prescribed by Dr. Ramirez.  Two Rx orders pending were prescribed by FLORINDA.    Needs refills for those.  Thanks,  Isela Villar RN

## 2021-08-23 NOTE — PROGRESS NOTES
Erwin is a 60 year old who is being evaluated via a billable video visit.      How would you like to obtain your AVS? MyChart  If the video visit is dropped, the invitation should be resent by: Text to cell phone: [phone]   Will anyone else be joining your video visit? No      Video Start Time: 11:13 AM.    [facility].    Hepatology follow-up    Chief complaint and reason for the visit is: alcoholic cirrhosis    Subjective:  Mr. Aguilar is 60 year old male with history of alcoholic cirrhosis.  He initially presented with hematemesis and melena and at that time he was found to have on  endoscopy esophageal varices as the cause of his bleeding.  He has now abstain it almost close to 7 years and in fact his MELD score dropped now to 6.  He has normal liver function tests    Patient denies jaundice, lower extremity edema, abdominal distension, lethargy or confusion. Patient denies melena, hematemesis or hematochezia.    His main issue today is that he has significant nausea he tried Zofran which partially helped him.  We ordered gastric emptying study which he did not do.  He is now also telling me that he eats  at least 2 meals and his last meal is usually around 8 PM.  He wakes up at night because of his right lower foot pain which he had for a time now.  He had  Achilles tendon debridement with partial calcaneus excision and still has the wound. He has  started cannabis for the chronic pain he suffers from.    He also tells me that he does not have any fever or chills. He has no signs of any respiratory or urinary tract infections.  He has lost around 15 pounds according to him since February.  He has tested negative in the past for Covid and in fact had been has been vaccinated with modern and has done both doses.    Medical hx Surgical hx   Past Medical History:   Diagnosis Date     Actinic keratosis     aldara     Anxiety      Cancer (H)     squamous cell skin CA     Cauda  equina spinal cord injury (H)      Chronic sinusitis 5-1-16     Depressive disorder      Diastasis recti      Esophageal reflux      Esophageal varices in cirrhosis (H) 4/1/2014    Hospitalized for UGI blee 3/28/14, endoscopy revealed bleeding varices.     Essential hypertension, benign      Intermittent asthma      Mild depression (H)      Mixed hyperlipidemia      Nasal polyps 5-1-16     Other chronic pain      SCCA (squamous cell carcinoma) of skin      Seasonal allergic conjunctivitis      Type II or unspecified type diabetes mellitus without mention of complication, not stated as uncontrolled      Unspecified site of spinal cord injury without evidence of spinal bone injury     due to back surgery      Past Surgical History:   Procedure Laterality Date     BACK SURGERY  August 2009     C APPENDECTOMY  1974     COLONOSCOPY N/A 5/12/2016    Procedure: COMBINED COLONOSCOPY, SINGLE OR MULTIPLE BIOPSY/POLYPECTOMY BY BIOPSY;  Surgeon: Ana Paula Urbina MD;  Location:  GI     ENDOSCOPY UPPER, COLONOSCOPY, COMBINED  10/19/2011    Procedure:COMBINED ENDOSCOPY UPPER, COLONOSCOPY; Upper Endoscopy, Colonoscopy with Polypectomy x4; Surgeon:AMBAR RODRÍGUEZIQ; Location: OR     ENT SURGERY  1-2016    Ongoing since then     ESOPHAGOSCOPY, GASTROSCOPY, DUODENOSCOPY (EGD), COMBINED  3/28/2014    Procedure: COMBINED ESOPHAGOSCOPY, GASTROSCOPY, DUODENOSCOPY (EGD);  EGD, Gastric Biopsies, Esophageal Banding;  Surgeon: Reyna Tovar MD;  Location:  OR     ESOPHAGOSCOPY, GASTROSCOPY, DUODENOSCOPY (EGD), COMBINED  6/9/2014    Procedure: COMBINED ESOPHAGOSCOPY, GASTROSCOPY, DUODENOSCOPY (EGD);  Surgeon: Curtis Mendez MD;  Location:  GI     ESOPHAGOSCOPY, GASTROSCOPY, DUODENOSCOPY (EGD), COMBINED  7/24/2014    Procedure: COMBINED ESOPHAGOSCOPY, GASTROSCOPY, DUODENOSCOPY (EGD);  Surgeon: Gerard Samaniego MD;  Location:  OR     ESOPHAGOSCOPY, GASTROSCOPY, DUODENOSCOPY (EGD), COMBINED N/A 10/31/2014     Procedure: COMBINED ESOPHAGOSCOPY, GASTROSCOPY, DUODENOSCOPY (EGD);  Surgeon: Gerard Samaniego MD;  Location: [facility] OR     ESOPHAGOSCOPY, GASTROSCOPY, DUODENOSCOPY (EGD), COMBINED N/A 5/12/2016    Procedure: COMBINED ESOPHAGOSCOPY, GASTROSCOPY, DUODENOSCOPY (EGD);  Surgeon: Ana Paula Urbina MD;  Location: [facility] GI     ESOPHAGOSCOPY, GASTROSCOPY, DUODENOSCOPY (EGD), COMBINED N/A 8/2/2018    Procedure: COMBINED ESOPHAGOSCOPY, GASTROSCOPY, DUODENOSCOPY (EGD);  EGD;  Surgeon: Yu Wagner MD;  Location: [facility] GI     HCL SQUAMOUS CELL CARCINOMA AG  10/07    left forearm     HERNIORRHAPHY UMBILICAL  11/8/2012    Procedure: HERNIORRHAPHY UMBILICAL;  Open Umbilical Hernia Repair With Mesh ;  Surgeon: Chase Nicholson MD;  Location: [facility] OR     INSERT STIMULATOR DORSAL COLUMN N/A 6/28/2018    Procedure: INSERT STIMULATOR DORSAL COLUMN;;  Surgeon: Elvis Sauceda MD;  Location:  OR     neuro stimulator  2010     REMOVE GENERATOR STIMULATOR (LOCATION) N/A 6/28/2018    Procedure: REMOVE GENERATOR STIMULATOR (LOCATION);  Spinal Cord Stimulator and IPG Explant and Re-Implant of SCS System (Leads and IPG);  Surgeon: Elvis Sauceda MD;  Location: [facility] OR     REPAIR TENDON ACHILLES Right 11/11/2020    Procedure: Right achilles debridement and partial calcaneus excision;  Surgeon: Eh Sandoval MD;  Location: [facility] OR     SURGICAL HISTORY OF -   1/02    abcess tooth     SURGICAL HISTORY OF -   1999    L4-5 laminectomy, cauda equina syndrome     SURGICAL HISTORY OF -   +    herniated disk repair     TONSILLECTOMY  10 1964     TRANSPOSITION ULNAR NERVE (ELBOW)      right          Medications  Prior to Admission medications    Medication Sig Start Date End Date Taking? Authorizing Provider   ACE/ARB NOT PRESCRIBED, INTENTIONAL, ACE & ARB not prescribed due to Allergy 8/2/12  Yes Ksenia Bean APRN CNP   albuterol (PROAIR HFA/PROVENTIL HFA/VENTOLIN HFA)  108 (90 Base) MCG/ACT inhaler INHALE 2 PUFFS BY MOUTH EVERY 6 HOURS AS NEEDED FOR SHORTNESS OF BREATH/DYSPNEA OR WHEEZING 6/9/21  Yes Wegener, Joel Daniel Irwin, MD   ammonium lactate (LAC-HYDRIN) 12 % cream Apply topically 2 times daily as needed for dry skin 11/1/17  Yes Victor M Anaya DPM   baclofen (LIORESAL) 10 MG tablet TAKE 2 TABLETS BY MOUTH THREE TIMES DAILY 6/3/21  Yes Wegener, Joel Daniel Irwin, MD   blood glucose (ACCU-CHEK KARISHMA PLUS) test strip USE TO TEST BLOOD SUGARS ONCE DAILY 9/5/19  Yes Wegener, Joel Daniel Irwin, MD   hydrochlorothiazide (HYDRODIURIL) 25 MG tablet TAKE 1 TABLET (25 MG) BY MOUTH DAILY 5/27/20  Yes Wegener, Joel Daniel Irwin, MD   insulin pen needle (BD ROSE U/F) 32G X 4 MM miscellaneous Use daily and as directed. 10/19/20  Yes Wegener, Joel Daniel Irwin, MD   potassium chloride ER (K-TAB) 20 MEQ CR tablet TAKE 1 TABLET (20 MEQ) BY MOUTH DAILY 6/16/20  Yes Wegener, Joel Daniel Irwin, MD   promethazine (PHENERGAN) 25 MG tablet TAKE 1 TABLET (25MG) BY MOUTH THREE TIMES DAILY AS NEEDED FOR NAUSEA. 7/29/21  Yes Wegener, Joel Daniel Irwin, MD   propranolol (INDERAL) 40 MG tablet TAKE 2 TABLETS (80 MG) BY MOUTH 2 TIMES DAILY 8/31/20  Yes Wegener, Joel Daniel Irwin, MD   rosuvastatin (CRESTOR) 10 MG tablet TAKE 1 TABLET BY MOUTH EVERY DAY 7/29/20  Yes Wegener, Joel Daniel Irwin, MD   rosuvastatin (CRESTOR) 20 MG tablet TAKE 1 TABLET BY MOUTH EVERY DAY 8/19/20  Yes Wegener, Joel Daniel Irwin, MD   sertraline (ZOLOFT) 100 MG tablet TAKE 2 TABLETS BY MOUTH EVERY DAY 10/8/19  Yes Wegener, Joel Daniel Irwin, MD   insulin glargine (BASAGLAR KWIKPEN) 100 UNIT/ML pen Inject 26 Units Subcutaneous daily 4/1/21 6/30/21  Wegener, Joel Daniel Irwin, MD       Allergies  Allergies   Allergen Reactions     Levaquin Anaphylaxis and Rash     Swelling in lip and tongue felt thick     Lisinopril Anaphylaxis     Swollen tongue; inability to swallow; drooling; hives; swollen face, neck, angioedema      Acetaminophen      Hx of cirrhosis      Amlodipine      Swelling, hives, possible angioedema       Morphine      b/p dropped and arms went numb  Hypotension     Quinolones Hives     Bactrim [Sulfamethoxazole W/Trimethoprim] Rash       Review of systems  A 10-point review of systems was negative    Examination  /76   Ht 1.829 m (6')   Wt 96.2 kg (212 lb)   BMI 28.75 kg/m    Body mass index is 28.75 kg/m .    Gen- well, NAD, A+Ox3, normal color.  Psych- normal mood    Laboratory  Lab Results   Component Value Date     04/09/2021    POTASSIUM 3.9 04/09/2021    CHLORIDE 109 04/09/2021    CO2 27 04/09/2021    BUN 7 04/09/2021    CR 0.68 04/09/2021       Lab Results   Component Value Date    BILITOTAL 0.7 04/09/2021    ALT 27 04/09/2021    AST 14 04/09/2021    ALKPHOS 115 04/09/2021       Lab Results   Component Value Date    ALBUMIN 4.1 04/09/2021    PROTTOTAL 8.0 04/09/2021        Lab Results   Component Value Date    WBC 6.5 04/09/2021    HGB 14.1 04/09/2021    MCV 85 04/09/2021     04/09/2021       Lab Results   Component Value Date    INR 1.01 04/09/2021     MELD-Na score: 6 at 4/9/2021  8:16 AM  MELD score: 6 at 4/9/2021  8:16 AM  Calculated from:  Serum Creatinine: 0.68 mg/dL (Using min of 1 mg/dL) at 4/9/2021  8:16 AM  Serum Sodium: 142 mmol/L (Using max of 137 mmol/L) at 4/9/2021  8:16 AM  Total Bilirubin: 0.7 mg/dL (Using min of 1 mg/dL) at 4/9/2021  8:16 AM  INR(ratio): 1.01 at 4/9/2021  8:16 AM  Age: 60 years      Radiology    Assessment and plan  Mr. Aguilar is 60 year old male with male with alcoholic liver disease.  He did abstain from alcohol and in fact has improved quite a lot that his MELD  score now is 6.  He does not show now any signs of chronic liver disease.  Denies any signs of fluid retention, hepatic encephalopathy or GI bleed.  Please note that his initial presentation was esophageal variceal bleed.  We did an abdominal ultrasound and this showed slightly increase it  echogenicity and a fibrosis scan done on January 24 of 2020 showed that he has liver fibrosis  stage F3.  Plan is to continue monitoring him.    For his chronic nausea we did order gastric emptying study before he will have it that now and at the same time he will follow with one of our luminal gastroenterologists here at the [place].    This was this was a 40-minute visit of which more than 50% was spent in explaining to the patient what our plan of care was and we answered all his questions.      Kimberly Ramirez MD  Hepatology  [facility]    Video-Visit Details    Type of service:  Video Visit    Video End Time:11:38 AM    Originating Location (pt. Location): Home.    Distant Location (provider location):  [facility]     Platform used for Video Visit: [email or web address].

## 2021-08-24 ENCOUNTER — TELEPHONE (OUTPATIENT)
Dept: GASTROENTEROLOGY | Facility: CLINIC | Age: 61
End: 2021-08-24

## 2021-08-24 NOTE — TELEPHONE ENCOUNTER
Health Call Center    Phone Message    May a detailed message be left on voicemail: yes     Reason for Call: Other: Per pt was told to schedule an appointment with Dr. Mathis but per pt wpoild like to speak with one of  nurses first. Per pt does not want to waste anyones time and would like totalk to a nurse first. Per pt has seen  in [facility] and was asked to reach out to . Please call pt back.       Action Taken: Message routed to:  [facility] Gastro    Travel Screening: Not Applicable

## 2021-08-25 ENCOUNTER — MYC MEDICAL ADVICE (OUTPATIENT)
Dept: FAMILY MEDICINE | Facility: CLINIC | Age: 61
End: 2021-08-25

## 2021-08-27 ENCOUNTER — MYC MEDICAL ADVICE (OUTPATIENT)
Dept: FAMILY MEDICINE | Facility: CLINIC | Age: 61
End: 2021-08-27

## 2021-08-27 ENCOUNTER — PATIENT OUTREACH (OUTPATIENT)
Dept: GASTROENTEROLOGY | Facility: CLINIC | Age: 61
End: 2021-08-27

## 2021-08-27 NOTE — TELEPHONE ENCOUNTER
Reached out to pt per referral by Dr Ramirez to Dr Mathis.  Dr Ramirez also ordered a NM GES, but pt is not opting to do this.  Pt reports constant nausea.   Pt is not able to complete GES due to inability to eat at all in the am.  (pt has attempted and failed to complete this test previously).  Pt would like to not schedule with GI at this time as he is not confident that he has the capacity for more healthcare or that his nausea will be alleviated through GI care.  Pt not scheduled at this time and will call back as needed to follow up on referral.

## 2021-09-07 ENCOUNTER — HOSPITAL ENCOUNTER (OUTPATIENT)
Dept: CARDIOLOGY | Facility: CLINIC | Age: 61
Discharge: HOME | End: 2021-09-07
Payer: MEDICARE

## 2021-09-07 DIAGNOSIS — I48.91 ATRIAL FIBRILLATION, UNSPECIFIED TYPE (H): ICD-10-CM

## 2021-09-07 LAB — LVEF ECHO: NORMAL

## 2021-09-07 PROCEDURE — 93306 TTE W/DOPPLER COMPLETE: CPT | Mod: 26 | Performed by: INTERNAL MEDICINE

## 2021-09-07 PROCEDURE — 93306 TTE W/DOPPLER COMPLETE: CPT

## 2021-09-08 ENCOUNTER — MYC MEDICAL ADVICE (OUTPATIENT)
Dept: FAMILY MEDICINE | Facility: CLINIC | Age: 61
End: 2021-09-08

## 2021-09-09 ENCOUNTER — MYC MEDICAL ADVICE (OUTPATIENT)
Dept: FAMILY MEDICINE | Facility: CLINIC | Age: 61
End: 2021-09-09

## 2021-09-09 NOTE — PROGRESS NOTES
"Erwin is a 60 year old who is being evaluated via a billable video visit.      How would you like to obtain your AVS? MyChart  If the video visit is dropped, the invitation should be resent by: Text to cell phone: [phone]  Will anyone else be joining your video visit? No      Video Start Time: 9:06    Assessment & Plan     Recent events -     08/26 - A-fib found on apple watch - started eliquis.  chads 2 vascular score 2 so this is appropriate. Was not taking daily aspirin and is not now.     Has not had a-fib since according to symptoms or apple watch.  Had no other med changes. Echo completed without vavlular disease or evidence of chf (see below) .  Has cardiology team follow up for a-fib next week.     09/07 EMERGENCY ROOM /hospitalization after rode bike to echocardiogram. (of note has biked over 1,000miles this year! Trying to stay active)  He describes feeling \"fainty\", lightheaded and had slurred speech.  He is very confident did not have stroke and has had similar episodes in past.  Lasted about 5 minuts.  Blood sugar 146 in EMS.  Potassium 3.0 in hospital.  He feels very much due to anxiety/poor sleep/poor appetite due to year's medical problems with leg, etc.     Anxiety - not hopeless/depressed but very anxious/revved up.  Poor sleep with ruminating thoughts.  Continues on sertraline.      Has used lorazepam in past quite effectively and would also like referral for psychotherapy which I agree would be very beneficial.     Given multiple potential medication interactions (if we used say buspar, trazodone or tricyclic for sleep) I feel a short course of lorazepam would be beneficial.  If he can re-gain sleep will help immensely with anxiety.   Discussed risks of increased sedation/dizziness/off balance with benzos to be particularly aware of given recent events.  Prescription lorazepam 1 mg three times daily as needed and I would recommend using fairly regularly for sleep.      Possible tia:  Overall " we both agree this is quite unlikely.  None the less will start daily aspirin as well for ischemic stroke prevention which is reasonable. Stroke is clinical diagnosis so MRI of little benefit unless it confirmed evidence of stroke in appropriate area.   Although complete brain vascular imaging would be ideal given substantial barriers to MRI (pain, anxiety, need for full anesthesia) he does not want to do this now.  We will at least do neck ultrasound to evaluate for carotid stenosis.  Depending on results can decide on neurology specialty follow up. We can re-evaluate need for MRI/MRA in future.     Radiology scheduling for ultrasound:  RADIOLOGY:  [facility]:  [phone]   [facility]: [phone]      Possible sleep apnea:  Sleep study recommended we will continue to discuss.     Will have team help schedule follow up in about two weeks.       Near syncope      Anxiety    - LORazepam (ATIVAN) 1 MG tablet  Dispense: 42 tablet; Refill: 0  - MENTAL HEALTH REFERRAL  - Adult; Outpatient Treatment; Individual/Couples/Family/Group Therapy/Health Psychology; [facility] [phone]; We will contact you to schedule the appointment or please call with any questions    Adjustment disorder with anxious mood    - LORazepam (ATIVAN) 1 MG tablet  Dispense: 42 tablet; Refill: 0  - MENTAL HEALTH REFERRAL  - Adult; Outpatient Treatment; Individual/Couples/Family/Group Therapy/Health Psychology; [facility] [phone]; We will contact you to schedule the appointment or please call with any questions    Psychophysiological insomnia    - LORazepam (ATIVAN) 1 MG tablet  Dispense: 42 tablet; Refill: 0  - MENTAL HEALTH REFERRAL  - Adult; Outpatient Treatment; Individual/Couples/Family/Group Therapy/Health Psychology; [facility] [phone]; We will contact you to schedule the appointment or please call with any questions    Paroxysmal atrial fibrillation (H)    -  aspirin (ASA) 81 MG chewable tablet  Dispense: 90 tablet; Refill: 3    Type 2 diabetes mellitus with diabetic polyneuropathy, with long-term current use of insulin (H)    - aspirin (ASA) 81 MG chewable tablet  Dispense: 90 tablet; Refill: 3               BMI:   Estimated body mass index is 28.75 kg/m  as calculated from the following:    Height as of 8/23/21: 1.829 m (6').    Weight as of 8/23/21: 96.2 kg (212 lb).           Return in about 2 weeks (around 9/24/2021).    Joel Daniel Wegener, MD  [facility] Hood is a 60 year old who presents for the following health issues  accompanied by his spouse on video    HPI       Hospital Follow-up Visit:  Pt states he feels okay but anxious   Hospital/Nursing Home/IP Rehab Facility: [facility]   Date of Admission: 9/07/2021  Date of Discharge: 09/08/2021  Reason(s) for Admission: TIA      Was your hospitalization related to COVID-19? No   Problems taking medications regularly:  None  Medication changes since discharge: None  Problems adhering to non-medication therapy:  None    Summary of hospitalization:  CareEverywhere information obtained and reviewed  Diagnostic Tests/Treatments reviewed.  Follow up needed: myself, cardiology next week.   Other Healthcare Providers Involved in Patient s Care:         None  Update since discharge: improved.       Post Discharge Medication Reconciliation: discharge medications reconciled and changed, per note/orders.  Plan of care communicated with patient and family              Reviewed history as above .  He is relieved to be meeting with me.  Feeling more settled with a-fib.  High level of anxiety from last year, covid and not being able to resolve issues around foot.  (see previous notes.)  Ruminating thoughts at night and not sleeping well.     Very much feels the root of main problems now is anxiety, insomnia leading to poor appetite , brain fatigue.  Poor appetite leading to  potential dehydration/low potassium, etc. He is very confident/feels strongly did not have a tia since felt so similar to other times when almost fainted     Feeling anxious/not depressed/hopeless.  Wants to live but struggling.  Biked over 1,000 miles this year with electric bike!         Review of Systems         Objective           Vitals:  No vitals were obtained today due to virtual visit.    Physical Exam   GENERAL: Healthy, alert and no distress  EYES: Eyes grossly normal to inspection.  No discharge or erythema, or obvious scleral/conjunctival abnormalities.  RESP: No audible wheeze, cough, or visible cyanosis.  No visible retractions or increased work of breathing.    SKIN: Visible skin clear. No significant rash, abnormal pigmentation or lesions.  NEURO: Cranial nerves grossly intact.  Mentation and speech appropriate for age.  PSYCH: Mentation appears normal, affect anxious, judgement and insight intact, normal speech and appearance well-groomed. Not suicidal      09/07/21 - echo ef 55-60%, no valve abnormalities, no evidence of helder chf.    Mild concentric wall thickening consistent with lv hypertrophy and lv diastolic dysfunction.     Reviewed 09/08 absolutely caer everywehre gfr nl, blood sugars 142, last potassium 3.0, covid negative, hgb 13.2 plts 233, troponin negative, tsh normal.                 Video-Visit Details    Type of service:  Video Visit    Video End Time:9:27    Originating Location (pt. Location): Home    Distant Location (provider location):  [facility]     Platform used for Video Visit: [employer] My [employer]

## 2021-09-10 ENCOUNTER — VIRTUAL VISIT (OUTPATIENT)
Dept: FAMILY MEDICINE | Facility: CLINIC | Age: 61
End: 2021-09-10
Payer: MEDICARE

## 2021-09-10 DIAGNOSIS — R55 NEAR SYNCOPE: ICD-10-CM

## 2021-09-10 DIAGNOSIS — F41.9 ANXIETY: Primary | ICD-10-CM

## 2021-09-10 DIAGNOSIS — E11.42 TYPE 2 DIABETES MELLITUS WITH DIABETIC POLYNEUROPATHY, WITH LONG-TERM CURRENT USE OF INSULIN (H): ICD-10-CM

## 2021-09-10 DIAGNOSIS — F43.22 ADJUSTMENT DISORDER WITH ANXIOUS MOOD: ICD-10-CM

## 2021-09-10 DIAGNOSIS — Z79.4 TYPE 2 DIABETES MELLITUS WITH DIABETIC POLYNEUROPATHY, WITH LONG-TERM CURRENT USE OF INSULIN (H): ICD-10-CM

## 2021-09-10 DIAGNOSIS — F51.04 PSYCHOPHYSIOLOGICAL INSOMNIA: ICD-10-CM

## 2021-09-10 DIAGNOSIS — I48.0 PAROXYSMAL ATRIAL FIBRILLATION (H): ICD-10-CM

## 2021-09-10 DIAGNOSIS — R47.81 SLURRED SPEECH: ICD-10-CM

## 2021-09-10 PROCEDURE — 99495 TRANSJ CARE MGMT MOD F2F 14D: CPT | Mod: 95 | Performed by: FAMILY MEDICINE

## 2021-09-10 RX ORDER — ASPIRIN 81 MG/1
81 TABLET, CHEWABLE ORAL DAILY
Qty: 90 TABLET | Refills: 3 | Status: SHIPPED | OUTPATIENT
Start: 2021-09-10 | End: 2021-10-13

## 2021-09-10 RX ORDER — LORAZEPAM 1 MG/1
1 TABLET ORAL 3 TIMES DAILY PRN
Qty: 42 TABLET | Refills: 0 | Status: SHIPPED | OUTPATIENT
Start: 2021-09-10 | End: 2021-09-30

## 2021-09-10 ASSESSMENT — ASTHMA QUESTIONNAIRES: ACT_TOTALSCORE: 25

## 2021-09-10 NOTE — PATIENT INSTRUCTIONS
"Recent events -     A-fib found on apple watch - started eliquis.  chads 2 vascular score 2 so this is appropriate. Was not taking daily aspirin and is not now.     Has not had a-fib since according to symptoms or apple watch.  Had no other med changes. Echo completed without vavlular disease or evidence of chf (see below) .  Has cardiology team follow up for a-fib next week.     09/07 EMERGENCY ROOM /hospitalization after rode bike to echocardiogram. (of note has biked over 1,000miles this year! Trying to stay active)  He describes feeling \"fainty\", lightheaded and had slurred speech.  He is very confident did not have stroke and has had similar episodes in past.  Lasted about 5 minuts.  Blood sugar 146 in EMS.  Potassium 3.0 in hospital.  He feels very much due to anxiety/poor sleep/poor appetite due to year's medical problems with leg, etc.     Anxiety - not hopeless/depressed but very anxious/revved up.  Poor sleep with ruminating thoughts.  Continues on sertraline.      Has used lorazepam in past quite effectively and would also like referral for psychotherapy which I agree would be very beneficial.     Given multiple potential medication interactions (if we used say buspar, trazodone or tricyclic for sleep) I feel a short course of lorazepam would be beneficial.  If he can re-gain sleep will help immensely with anxiety.   Discussed risks of increased sedation/dizziness/off balance with benzos to be particularly aware of given recent events.  Prescription lorazepam 1 mg three times daily as needed and I would recommend using fairly regularly for sleep.      Possible tia:  Overall we both agree this is quite unlikely.  None the less will start daily aspirin as well for ischemic stroke prevention which is reasonable. Stroke is clinical diagnosis so MRI of little benefit unless it confirmed evidence of stroke in appropriate area.   Although complete brain vascular imaging would be ideal given substantial barriers " to MRI (pain, anxiety, need for full anesthesia) he does not want to do this now.  We will at least do neck ultrasound to evaluate for carotid stenosis.  Depending on results can decide on neurology specialty follow up. We can re-evaluate need for MRI/MRA in future.     Radiology scheduling for ultrasound:  RADIOLOGY:  [facility]:  [phone]   [facility]: [phone]      Possible sleep apnea:  Sleep study recommended we will continue to discuss.     Will have team help schedule follow up in about two weeks.

## 2021-09-13 ENCOUNTER — MYC MEDICAL ADVICE (OUTPATIENT)
Dept: FAMILY MEDICINE | Facility: CLINIC | Age: 61
End: 2021-09-13

## 2021-09-14 ENCOUNTER — TELEPHONE (OUTPATIENT)
Dept: CARDIOLOGY | Facility: CLINIC | Age: 61
End: 2021-09-14

## 2021-09-14 ENCOUNTER — MYC MEDICAL ADVICE (OUTPATIENT)
Dept: CARDIOLOGY | Facility: CLINIC | Age: 61
End: 2021-09-14

## 2021-09-14 ENCOUNTER — MYC MEDICAL ADVICE (OUTPATIENT)
Dept: FAMILY MEDICINE | Facility: CLINIC | Age: 61
End: 2021-09-14

## 2021-09-14 ENCOUNTER — OFFICE VISIT (OUTPATIENT)
Dept: CARDIOLOGY | Facility: CLINIC | Age: 61
End: 2021-09-14
Payer: MEDICARE

## 2021-09-14 VITALS
DIASTOLIC BLOOD PRESSURE: 72 MMHG | HEART RATE: 60 BPM | RESPIRATION RATE: 16 BRPM | WEIGHT: 214 LBS | BODY MASS INDEX: 29.02 KG/M2 | OXYGEN SATURATION: 99 % | SYSTOLIC BLOOD PRESSURE: 160 MMHG

## 2021-09-14 DIAGNOSIS — I10 HYPERTENSION GOAL BP (BLOOD PRESSURE) < 140/90: Primary | ICD-10-CM

## 2021-09-14 DIAGNOSIS — I48.0 PAROXYSMAL ATRIAL FIBRILLATION (H): ICD-10-CM

## 2021-09-14 PROCEDURE — 99204 OFFICE O/P NEW MOD 45 MIN: CPT | Performed by: INTERNAL MEDICINE

## 2021-09-14 RX ORDER — SPIRONOLACTONE 25 MG/1
25 TABLET ORAL DAILY
Qty: 30 TABLET | Refills: 3 | Status: SHIPPED | OUTPATIENT
Start: 2021-09-14 | End: 2021-09-15 | Stop reason: ALTCHOICE

## 2021-09-14 NOTE — PROGRESS NOTES
Thank you, Dr. Wegener, for asking [facility] to evaluate Mr. Erwin Aguilar.      Assessment/Recommendations   Assessment:    Paroxysmal atrial fibrillation, asymptomatic, first documented episode, resolved  Hypertension, suboptimal control  Alcoholic liver disease with history of portal hypertension and esophageal bleeding, well controlled  Diabetes mellitus      Plan:  We had a lengthy discussion about treatment of paroxysmal atrial fibrillation.  We went over the rate and rhythm control strategies.  He appears to have either no symptoms of mild symptoms when he is in atrial fibrillation.  I think the rate control strategy may be effective way of managing his A. fib.  The time will tell us about the frequency and severity of symptoms.  We can adjust our approach depending on his symptomatic status.  He needs to be protected from cardioembolic event.  He is happy to take Eliquis.  Because of his alcoholic liver disease and history of bleeding there is increased risk of complications from chronic anticoagulation.  We discussed the option of mechanical occlusion of left atrial appendage.    I think that elevated blood pressure contributes to development of atrial fibrillation.  He appears to have troubles with potassium homeostasis.  He is unable to take lisinopril because of angioedema.  I will put him on spironolactone with the goal of improving blood pressure and avoiding hypokalemia.  He he was instructed to stop taking potassium replacement.  He will have electrolytes checked by his liver specialist next week.  I requested him to ask to forward results to me.    Follow-up in 1 month       History of Present Illness    Mr. Erwin Aguilar is a 60 year old male who comes in for initial cardiac evaluation.  About 2 weeks ago he  noticed elevated heart rate on his apple watch.  He did not have a sensation of heart palpitations.  He did not feel any different than normal.  He was concerned  about elevated heart rate and came to the emergency room at [facility].  EKG showed atrial fibrillation.  He was started on Eliquis.  The episode of atrial fibrillation resolved spontaneously after about 18 hours.  He denies to me history of documented atrial fibrillation's in the past.  He has a longstanding history of hypertension.  He states it is not well controlled.  He is not known to have coronary artery disease or valvular heart disease.    He has a longstanding history of alcoholic liver disease with cirrhosis and portal hypertension.  He has been abstinent for over 5 years now.  He states that liver indices improved significantly.  He has been taking propranolol to for prevention of recurrent esophageal bleeding.    ECG: Personally reviewed.  Atrial fibrillation    Echocardiogram: September 2021  Global and regional left ventricular function is normal with an EF of 55-60%.  Right ventricular function, chamber size, wall motion, and thickness are  normal.  No hemodynamcially signifcant valvular abnormalities.  Estimated mean RA pressure is mildly elevated at 8 mmHg.  No pericardial effusion is present.  :      Physical Examination Review of Systems   BP (!) 160/72 (BP Location: Right arm, Patient Position: Sitting, Cuff Size: Adult Regular)   Pulse 60   Resp 16   Wt 97.1 kg (214 lb)   SpO2 99%   BMI 29.02 kg/m    Body mass index is 29.02 kg/m .  Wt Readings from Last 3 Encounters:   09/14/21 97.1 kg (214 lb)   08/23/21 96.2 kg (212 lb)   01/19/21 94.3 kg (208 lb)     General Appearance:   Alert, cooperative, no distress, appears stated age   Head/ENT: Normocephalic, without obvious abnormality. Membranes moist      EYES:  no scleral icterus, normal conjunctivae   Neck: Supple, symmetrical, trachea midline, no adenopathy, thyroid: not enlarged, symmetric, no carotid bruit or JVD   Chest/Lungs:   Lungs are clear to auscultation, respirations unlabored. No tenderness or deformity    Cardiovascular:   Regular  rhythm, S1, S2 normal, no murmur, rub or gallop.   Abdomen:  Soft, non-tender, bowel sounds active all four quadrants,  no masses, no organomegaly   Extremities: no cyanosis or clubbing. No edema   Skin: Skin color, texture, turgor normal, no rashes or lesions.    Psychiatric: Normal affect, calm   Neurologic: Alert and oriented x 3, moving all four extremities.     Enc Vitals  BP: (!) 160/72  Pulse: 60  Resp: 16  SpO2: 99 %  Weight: 97.1 kg (214 lb)                                          Lab Results    Chemistry/lipid CBC Cardiac Enzymes/BNP/TSH/INR   Recent Labs   Lab Test 04/09/21  0813 07/03/15  0910   CHOL 145 220*   HDL 48 33*   LDL 78 166*   TRIG 97 105   CHOLHDLRATIO  --  6.7*     Recent Labs   Lab Test 04/09/21  0813 07/20/20  0810 04/02/19  0927   LDL 78 172* 147*     Recent Labs   Lab Test 04/09/21  0816      POTASSIUM 3.9   CHLORIDE 109   CO2 27   *   BUN 7   CR 0.68   GFRESTIMATED >90   [place] 8.9     Recent Labs   Lab Test 04/09/21  0816 11/23/20  1710 11/09/20  1426   CR 0.68 0.65* 0.85     Recent Labs   Lab Test 04/09/21  0813 07/20/20  0810 10/08/19  0842   A1C 6.0* 5.8* 6.4*          Recent Labs   Lab Test 04/09/21  0816   WBC 6.5   HGB 14.1   HCT 40.6   MCV 85        Recent Labs   Lab Test 04/09/21  0816 11/23/20  1710 11/09/20  1426   HGB 14.1 14.3 13.5    No results for input(s): TROPONINI in the last 09012 hours.  No results for input(s): BNP, NTBNPI, NTBNP in the last 82075 hours.  Recent Labs   Lab Test 10/09/18  0923   TSH 1.36     Recent Labs   Lab Test 04/09/21  0816 07/20/20  0810 01/21/20  0739   INR 1.01 1.26* 1.12        Medical History  Surgical History Family History Social History   Past Medical History:   Diagnosis Date     Actinic keratosis     aldara     Anxiety      Cancer (H)     squamous cell skin CA     Cauda equina spinal cord injury (H)      Chronic sinusitis 5-1-16     Depressive disorder      Diastasis recti      Esophageal reflux      Esophageal  varices in cirrhosis (H) 4/1/2014    Hospitalized for UGI blee 3/28/14, endoscopy revealed bleeding varices.     Essential hypertension, benign      Intermittent asthma      Mild depression (H)      Mixed hyperlipidemia      Nasal polyps 5-1-16     Other chronic pain      SCCA (squamous cell carcinoma) of skin      Seasonal allergic conjunctivitis      Type II or unspecified type diabetes mellitus without mention of complication, not stated as uncontrolled      Unspecified site of spinal cord injury without evidence of spinal bone injury     due to back surgery     Past Surgical History:   Procedure Laterality Date     ABDOMEN SURGERY  2014     BACK SURGERY  August 2009     C APPENDECTOMY  1974     COLONOSCOPY N/A 5/12/2016    Procedure: COMBINED COLONOSCOPY, SINGLE OR MULTIPLE BIOPSY/POLYPECTOMY BY BIOPSY;  Surgeon: Ana Paula Urbina MD;  Location:  GI     ENDOSCOPY UPPER, COLONOSCOPY, COMBINED  10/19/2011    Procedure:COMBINED ENDOSCOPY UPPER, COLONOSCOPY; Upper Endoscopy, Colonoscopy with Polypectomy x4; Surgeon:AMBAR RODRÍGUEZ; Location: OR     ENT SURGERY  1-2016    Ongoing since then     ESOPHAGOSCOPY, GASTROSCOPY, DUODENOSCOPY (EGD), COMBINED  3/28/2014    Procedure: COMBINED ESOPHAGOSCOPY, GASTROSCOPY, DUODENOSCOPY (EGD);  EGD, Gastric Biopsies, Esophageal Banding;  Surgeon: Reyna Tovar MD;  Location:  OR     ESOPHAGOSCOPY, GASTROSCOPY, DUODENOSCOPY (EGD), COMBINED  6/9/2014    Procedure: COMBINED ESOPHAGOSCOPY, GASTROSCOPY, DUODENOSCOPY (EGD);  Surgeon: Curtis Mendez MD;  Location:  GI     ESOPHAGOSCOPY, GASTROSCOPY, DUODENOSCOPY (EGD), COMBINED  7/24/2014    Procedure: COMBINED ESOPHAGOSCOPY, GASTROSCOPY, DUODENOSCOPY (EGD);  Surgeon: Gerard Samaniego MD;  Location:  OR     ESOPHAGOSCOPY, GASTROSCOPY, DUODENOSCOPY (EGD), COMBINED N/A 10/31/2014    Procedure: COMBINED ESOPHAGOSCOPY, GASTROSCOPY, DUODENOSCOPY (EGD);  Surgeon: Gerard Samaniego MD;  Location:  OR      ESOPHAGOSCOPY, GASTROSCOPY, DUODENOSCOPY (EGD), COMBINED N/A 5/12/2016    Procedure: COMBINED ESOPHAGOSCOPY, GASTROSCOPY, DUODENOSCOPY (EGD);  Surgeon: Ana Paula Urbina MD;  Location:  GI     ESOPHAGOSCOPY, GASTROSCOPY, DUODENOSCOPY (EGD), COMBINED N/A 8/2/2018    Procedure: COMBINED ESOPHAGOSCOPY, GASTROSCOPY, DUODENOSCOPY (EGD);  EGD;  Surgeon: Yu Wagner MD;  Location: [facility] GI     HCL SQUAMOUS CELL CARCINOMA AG  10/07    left forearm     HERNIORRHAPHY UMBILICAL  11/8/2012    Procedure: HERNIORRHAPHY UMBILICAL;  Open Umbilical Hernia Repair With Mesh ;  Surgeon: Chase Nicholson MD;  Location: [facility] OR     INSERT STIMULATOR DORSAL COLUMN N/A 6/28/2018    Procedure: INSERT STIMULATOR DORSAL COLUMN;;  Surgeon: Elvis Sauceda MD;  Location: [facility] OR     neuro stimulator  2010     REMOVE GENERATOR STIMULATOR (LOCATION) N/A 6/28/2018    Procedure: REMOVE GENERATOR STIMULATOR (LOCATION);  Spinal Cord Stimulator and IPG Explant and Re-Implant of SCS System (Leads and IPG);  Surgeon: Elvis Sauceda MD;  Location: [facility] OR     REPAIR TENDON ACHILLES Right 11/11/2020    Procedure: Right achilles debridement and partial calcaneus excision;  Surgeon: Eh Sandoval MD;  Location: [facility] OR     SURGICAL HISTORY OF -   1/02    abcess tooth     SURGICAL HISTORY OF -   1999    L4-5 laminectomy, cauda equina syndrome     SURGICAL HISTORY OF -   +    herniated disk repair     TONSILLECTOMY  10 1964     TRANSPOSITION ULNAR NERVE (ELBOW)      right     No premature CAD, SCD,cardiomyopathy   Social History     Socioeconomic History     Marital status: Single     Spouse name: Not on file     Number of children: 0     Years of education: Not on file     Highest education level: Not on file   Occupational History     Occupation: sales     Employer: UNEMPLOYED   Tobacco Use     Smoking status: Former Smoker     Packs/day: 0.00     Years: 1.00     Pack years: 0.00      Types: Cigarettes     Start date: 10/13/1983     Quit date: 1984     Years since quittin.2     Smokeless tobacco: Never Used   Substance and Sexual Activity     Alcohol use: No     Alcohol/week: 0.0 standard drinks     Comment: a pint of alcohol / day - last drink was 3/28/14     Drug use: Yes     Frequency: 2.0 times per week     Types: Marijuana     Comment: marijuana - occasional     Sexual activity: Not Currently     Partners: Female     Birth control/protection: Abstinence   Other Topics Concern     Parent/sibling w/ CABG, MI or angioplasty before 65F 55M? No   Social History Narrative     Not on file     Social Determinants of Health     Financial Resource Strain:      Difficulty of Paying Living Expenses:    Food Insecurity:      Worried About Running Out of Food in the Last Year:      Ran Out of Food in the Last Year:    Transportation Needs: No Transportation Needs     Lack of Transportation (Medical): No     Lack of Transportation (Non-Medical): No   Physical Activity: Inactive     Days of Exercise per Week: 0 days     Minutes of Exercise per Session: 0 min   Stress:      Feeling of Stress :    Social Connections: Unknown     Frequency of Communication with Friends and Family: Not on file     Frequency of Social Gatherings with Friends and Family: Not on file     Attends Jewish Services: Not on file     Active Member of Clubs or Organizations: Not on file     Attends Club or Organization Meetings: Not on file     Marital Status:    Intimate Partner Violence:      Fear of Current or Ex-Partner:      Emotionally Abused:      Physically Abused:      Sexually Abused:          Medications  Allergies   Current Outpatient Medications   Medication Sig Dispense Refill     ACE/ARB NOT PRESCRIBED, INTENTIONAL, ACE & ARB not prescribed due to Allergy       albuterol (PROAIR HFA/PROVENTIL HFA/VENTOLIN HFA) 108 (90 Base) MCG/ACT inhaler INHALE 2 PUFFS BY MOUTH EVERY 6 HOURS AS NEEDED FOR SHORTNESS  OF BREATH/DYSPNEA OR WHEEZING 54 g 3     ammonium lactate (LAC-HYDRIN) 12 % cream Apply topically 2 times daily as needed for dry skin 385 g 3     apixaban ANTICOAGULANT (ELIQUIS) 5 MG tablet Take 5 mg by mouth 2 times daily       aspirin (ASA) 81 MG chewable tablet Take 1 tablet (81 mg) by mouth daily 90 tablet 3     baclofen (LIORESAL) 10 MG tablet TAKE 2 TABLETS BY MOUTH THREE TIMES DAILY 180 tablet 8     blood glucose (ACCU-CHEK KARISHMA PLUS) test strip USE TO TEST BLOOD SUGARS ONCE DAILY 100 strip 1     hydrochlorothiazide (HYDRODIURIL) 25 MG tablet TAKE 1 TABLET (25 MG) BY MOUTH DAILY 90 tablet 3     insulin glargine (BASAGLAR KWIKPEN) 100 UNIT/ML pen Inject 26 Units Subcutaneous daily 24 mL 0     insulin pen needle (BD ROSE U/F) 32G X 4 MM miscellaneous Use daily and as directed. 100 each 3     LORazepam (ATIVAN) 1 MG tablet Take 1 tablet (1 mg) by mouth 3 times daily as needed for anxiety or sleep 42 tablet 0     promethazine (PHENERGAN) 25 MG tablet TAKE 1 TABLET (25MG) BY MOUTH THREE TIMES DAILY AS NEEDED FOR NAUSEA. 90 tablet 0     propranolol (INDERAL) 40 MG tablet TAKE 2 TABLETS (80 MG) BY MOUTH 2 TIMES DAILY 360 tablet 3     rosuvastatin (CRESTOR) 10 MG tablet TAKE 1 TABLET BY MOUTH EVERY DAY 90 tablet 3     rosuvastatin (CRESTOR) 20 MG tablet TAKE 1 TABLET BY MOUTH EVERY DAY 90 tablet 1     spironolactone (ALDACTONE) 25 MG tablet Take 1 tablet (25 mg) by mouth daily 30 tablet 3     sertraline (ZOLOFT) 100 MG tablet TAKE 2 TABLETS BY MOUTH EVERY  tablet 3        Allergies   Allergen Reactions     Levaquin Anaphylaxis and Rash     Swelling in lip and tongue felt thick     Lisinopril Anaphylaxis     Swollen tongue; inability to swallow; drooling; hives; swollen face, neck, angioedema     Acetaminophen      Hx of cirrhosis      Amlodipine      Swelling, hives, possible angioedema       Morphine      b/p dropped and arms went numb  Hypotension     Quinolones Hives     Bactrim [Sulfamethoxazole  W/Trimethoprim] Rash

## 2021-09-14 NOTE — TELEPHONE ENCOUNTER
"----- Message from Vicki Calhoun sent at 9/14/2021  1:05 PM CDT -----  Regarding: LORRAINE pt  General phone call:    Caller: Erwin  Primary cardiologist: LORRAINE john   Detailed reason for call: He was prescribed Spironolactone today by LORRAINE and he's pretty sure he is allergic to it from 10 years ago     Best phone number: [phone]   Best time to contact: any  Ok to leave a detailedmessage? yes  Device? no    Additional Info:     Called back Erwin to address his concerns. See [employer] message to [facility] as well dated 9/14 where question was addressed. Pt believes he may have been on Spironolactone in the past and it falls \"under a whole class of drugs he cannot take\". Writer reviewed allergy list with patient and it has \"quinolones\" which writer explained is a class of antibiotics of which spironolactone does not fall into. He is still very unsure and convinced that he cannot take it. He is concerned about his uncontrolled blood pressure. Writer explained that [facility] would not prescribe something that he was allergic to, so if he is unsure if he has an allergy, he should contact his primary care doctor to see if they have record of this. He states it was over a decade ago. Writer explained that they should be able to find records of this if it is the same office/clinic. He will decide what to do and respond back to [facility] on studdex. Update to be sent to [facility].  -[facility]  "

## 2021-09-14 NOTE — LETTER
9/14/2021    Joel Daniel Wegener, MD  [address] [facility] [address]  [facility] [address]    RE: Erwin Aguilar       Dear Colleague,    I had the pleasure of seeing Erwin Aguilar in the [facility].          Thank you, Dr. Wegener, for asking [facility] to evaluate Mr. Erwin Aguilar.      Assessment/Recommendations   Assessment:    Paroxysmal atrial fibrillation, asymptomatic, first documented episode, resolved  Hypertension, suboptimal control  Alcoholic liver disease with history of portal hypertension and esophageal bleeding, well controlled  Diabetes mellitus      Plan:  We had a lengthy discussion about treatment of paroxysmal atrial fibrillation.  We went over the rate and rhythm control strategies.  He appears to have either no symptoms of mild symptoms when he is in atrial fibrillation.  I think the rate control strategy may be effective way of managing his A. fib.  The time will tell us about the frequency and severity of symptoms.  We can adjust our approach depending on his symptomatic status.  He needs to be protected from cardioembolic event.  He is happy to take Eliquis.  Because of his alcoholic liver disease and history of bleeding there is increased risk of complications from chronic anticoagulation.  We discussed the option of mechanical occlusion of left atrial appendage.    I think that elevated blood pressure contributes to development of atrial fibrillation.  He appears to have troubles with potassium homeostasis.  He is unable to take lisinopril because of angioedema.  I will put him on spironolactone with the goal of improving blood pressure and avoiding hypokalemia.  He he was instructed to stop taking potassium replacement.  He will have electrolytes checked by his liver specialist next week.  I requested him to ask to forward results to me.    Follow-up in 1 month       History of Present Illness    Mr. Erwin DIAZ  Jeff is a 60 year old male who comes in for initial cardiac evaluation.  About 2 weeks ago he  noticed elevated heart rate on his apple watch.  He did not have a sensation of heart palpitations.  He did not feel any different than normal.  He was concerned about elevated heart rate and came to the emergency room at [facility].  EKG showed atrial fibrillation.  He was started on Eliquis.  The episode of atrial fibrillation resolved spontaneously after about 18 hours.  He denies to me history of documented atrial fibrillation's in the past.  He has a longstanding history of hypertension.  He states it is not well controlled.  He is not known to have coronary artery disease or valvular heart disease.    He has a longstanding history of alcoholic liver disease with cirrhosis and portal hypertension.  He has been abstinent for over 5 years now.  He states that liver indices improved significantly.  He has been taking propranolol to for prevention of recurrent esophageal bleeding.    ECG: Personally reviewed.  Atrial fibrillation    Echocardiogram: September 2021  Global and regional left ventricular function is normal with an EF of 55-60%.  Right ventricular function, chamber size, wall motion, and thickness are  normal.  No hemodynamcially signifcant valvular abnormalities.  Estimated mean RA pressure is mildly elevated at 8 mmHg.  No pericardial effusion is present.  :      Physical Examination Review of Systems   BP (!) 160/72 (BP Location: Right arm, Patient Position: Sitting, Cuff Size: Adult Regular)   Pulse 60   Resp 16   Wt 97.1 kg (214 lb)   SpO2 99%   BMI 29.02 kg/m    Body mass index is 29.02 kg/m .  Wt Readings from Last 3 Encounters:   09/14/21 97.1 kg (214 lb)   08/23/21 96.2 kg (212 lb)   01/19/21 94.3 kg (208 lb)     General Appearance:   Alert, cooperative, no distress, appears stated age   Head/ENT: Normocephalic, without obvious abnormality. Membranes moist      EYES:  no scleral icterus, normal  conjunctivae   Neck: Supple, symmetrical, trachea midline, no adenopathy, thyroid: not enlarged, symmetric, no carotid bruit or JVD   Chest/Lungs:   Lungs are clear to auscultation, respirations unlabored. No tenderness or deformity    Cardiovascular:   Regular rhythm, S1, S2 normal, no murmur, rub or gallop.   Abdomen:  Soft, non-tender, bowel sounds active all four quadrants,  no masses, no organomegaly   Extremities: no cyanosis or clubbing. No edema   Skin: Skin color, texture, turgor normal, no rashes or lesions.    Psychiatric: Normal affect, calm   Neurologic: Alert and oriented x 3, moving all four extremities.     Enc Vitals  BP: (!) 160/72  Pulse: 60  Resp: 16  SpO2: 99 %  Weight: 97.1 kg (214 lb)                                          Lab Results    Chemistry/lipid CBC Cardiac Enzymes/BNP/TSH/INR   Recent Labs   Lab Test 04/09/21  0813 07/03/15  0910   CHOL 145 220*   HDL 48 33*   LDL 78 166*   TRIG 97 105   CHOLHDLRATIO  --  6.7*     Recent Labs   Lab Test 04/09/21  0813 07/20/20  0810 04/02/19  0927   LDL 78 172* 147*     Recent Labs   Lab Test 04/09/21  0816      POTASSIUM 3.9   CHLORIDE 109   CO2 27   *   BUN 7   CR 0.68   GFRESTIMATED >90   [place] 8.9     Recent Labs   Lab Test 04/09/21  0816 11/23/20  1710 11/09/20  1426   CR 0.68 0.65* 0.85     Recent Labs   Lab Test 04/09/21  0813 07/20/20  0810 10/08/19  0842   A1C 6.0* 5.8* 6.4*          Recent Labs   Lab Test 04/09/21  0816   WBC 6.5   HGB 14.1   HCT 40.6   MCV 85        Recent Labs   Lab Test 04/09/21  0816 11/23/20  1710 11/09/20  1426   HGB 14.1 14.3 13.5    No results for input(s): TROPONINI in the last 02210 hours.  No results for input(s): BNP, NTBNPI, NTBNP in the last 08937 hours.  Recent Labs   Lab Test 10/09/18  0923   TSH 1.36     Recent Labs   Lab Test 04/09/21  0816 07/20/20  0810 01/21/20  0739   INR 1.01 1.26* 1.12        Medical History  Surgical History Family History Social History   Past Medical History:    Diagnosis Date     Actinic keratosis     aldara     Anxiety      Cancer (H)     squamous cell skin CA     Cauda equina spinal cord injury (H)      Chronic sinusitis 5-1-16     Depressive disorder      Diastasis recti      Esophageal reflux      Esophageal varices in cirrhosis (H) 4/1/2014    Hospitalized for UGI blee 3/28/14, endoscopy revealed bleeding varices.     Essential hypertension, benign      Intermittent asthma      Mild depression (H)      Mixed hyperlipidemia      Nasal polyps 5-1-16     Other chronic pain      SCCA (squamous cell carcinoma) of skin      Seasonal allergic conjunctivitis      Type II or unspecified type diabetes mellitus without mention of complication, not stated as uncontrolled      Unspecified site of spinal cord injury without evidence of spinal bone injury     due to back surgery     Past Surgical History:   Procedure Laterality Date     ABDOMEN SURGERY  2014     BACK SURGERY  August 2009     C APPENDECTOMY  1974     COLONOSCOPY N/A 5/12/2016    Procedure: COMBINED COLONOSCOPY, SINGLE OR MULTIPLE BIOPSY/POLYPECTOMY BY BIOPSY;  Surgeon: Ana Paula Urbina MD;  Location:  GI     ENDOSCOPY UPPER, COLONOSCOPY, COMBINED  10/19/2011    Procedure:COMBINED ENDOSCOPY UPPER, COLONOSCOPY; Upper Endoscopy, Colonoscopy with Polypectomy x4; Surgeon:AMBAR RODRÍGUEZIQ; Location:[facility] OR     ENT SURGERY  1-2016    Ongoing since then     ESOPHAGOSCOPY, GASTROSCOPY, DUODENOSCOPY (EGD), COMBINED  3/28/2014    Procedure: COMBINED ESOPHAGOSCOPY, GASTROSCOPY, DUODENOSCOPY (EGD);  EGD, Gastric Biopsies, Esophageal Banding;  Surgeon: Reyna Tovar MD;  Location:  OR     ESOPHAGOSCOPY, GASTROSCOPY, DUODENOSCOPY (EGD), COMBINED  6/9/2014    Procedure: COMBINED ESOPHAGOSCOPY, GASTROSCOPY, DUODENOSCOPY (EGD);  Surgeon: Curtis Mendez MD;  Location:  GI     ESOPHAGOSCOPY, GASTROSCOPY, DUODENOSCOPY (EGD), COMBINED  7/24/2014    Procedure: COMBINED ESOPHAGOSCOPY, GASTROSCOPY,  DUODENOSCOPY (EGD);  Surgeon: Gerard Samaniego MD;  [facility]     ESOPHAGOSCOPY, GASTROSCOPY, DUODENOSCOPY (EGD), COMBINED N/A 10/31/2014    Procedure: COMBINED ESOPHAGOSCOPY, GASTROSCOPY, DUODENOSCOPY (EGD);  Surgeon: Geradr Samaniego MD;  [facility]     ESOPHAGOSCOPY, GASTROSCOPY, DUODENOSCOPY (EGD), COMBINED N/A 5/12/2016    Procedure: COMBINED ESOPHAGOSCOPY, GASTROSCOPY, DUODENOSCOPY (EGD);  Surgeon: Ana Paula Urbina MD;  [facility]     ESOPHAGOSCOPY, GASTROSCOPY, DUODENOSCOPY (EGD), COMBINED N/A 8/2/2018    Procedure: COMBINED ESOPHAGOSCOPY, GASTROSCOPY, DUODENOSCOPY (EGD);  EGD;  Surgeon: Yu Wagner MD;  [facility]     HCL SQUAMOUS CELL CARCINOMA AG  10/07    left forearm     HERNIORRHAPHY UMBILICAL  11/8/2012    Procedure: HERNIORRHAPHY UMBILICAL;  Open Umbilical Hernia Repair With Mesh ;  Surgeon: Chase Nicholson MD;  Location: [facility] OR     INSERT STIMULATOR DORSAL COLUMN N/A 6/28/2018    Procedure: INSERT STIMULATOR DORSAL COLUMN;;  Surgeon: Elvis Sauceda MD;  [facility] OR     neuro stimulator  2010     REMOVE GENERATOR STIMULATOR (LOCATION) N/A 6/28/2018    Procedure: REMOVE GENERATOR STIMULATOR (LOCATION);  Spinal Cord Stimulator and IPG Explant and Re-Implant of SCS System (Leads and IPG);  Surgeon: Elvis Sauceda MD;  [facility]     REPAIR TENDON ACHILLES Right 11/11/2020    Procedure: Right achilles debridement and partial calcaneus excision;  Surgeon: Eh Sandoval MD;  Location: [facility] OR     SURGICAL HISTORY OF -   1/02    abcess tooth     SURGICAL HISTORY OF -   1999    L4-5 laminectomy, cauda equina syndrome     SURGICAL HISTORY OF -   +    herniated disk repair     TONSILLECTOMY  10 1964     TRANSPOSITION ULNAR NERVE (ELBOW)      right     No premature CAD, SCD,cardiomyopathy   Social History     Socioeconomic History     Marital status: Single     Spouse name: Not on file      Number of children: 0     Years of education: Not on file     Highest education level: Not on file   Occupational History     Occupation: sales     Employer: UNEMPLOYED   Tobacco Use     Smoking status: Former Smoker     Packs/day: 0.00     Years: 1.00     Pack years: 0.00     Types: Cigarettes     Start date: 10/13/1983     Quit date: 1984     Years since quittin.2     Smokeless tobacco: Never Used   Substance and Sexual Activity     Alcohol use: No     Alcohol/week: 0.0 standard drinks     Comment: a pint of alcohol / day - last drink was 3/28/14     Drug use: Yes     Frequency: 2.0 times per week     Types: Marijuana     Comment: marijuana - occasional     Sexual activity: Not Currently     Partners: Female     Birth control/protection: Abstinence   Other Topics Concern     Parent/sibling w/ CABG, MI or angioplasty before 65F 55M? No   Social History Narrative     Not on file     Social Determinants of Health     Financial Resource Strain:      Difficulty of Paying Living Expenses:    Food Insecurity:      Worried About Running Out of Food in the Last Year:      Ran Out of Food in the Last Year:    Transportation Needs: No Transportation Needs     Lack of Transportation (Medical): No     Lack of Transportation (Non-Medical): No   Physical Activity: Inactive     Days of Exercise per Week: 0 days     Minutes of Exercise per Session: 0 min   Stress:      Feeling of Stress :    Social Connections: Unknown     Frequency of Communication with Friends and Family: Not on file     Frequency of Social Gatherings with Friends and Family: Not on file     Attends Druze Services: Not on file     Active Member of Clubs or Organizations: Not on file     Attends Club or Organization Meetings: Not on file     Marital Status:    Intimate Partner Violence:      Fear of Current or Ex-Partner:      Emotionally Abused:      Physically Abused:      Sexually Abused:          Medications  Allergies   Current Outpatient  Medications   Medication Sig Dispense Refill     ACE/ARB NOT PRESCRIBED, INTENTIONAL, ACE & ARB not prescribed due to Allergy       albuterol (PROAIR HFA/PROVENTIL HFA/VENTOLIN HFA) 108 (90 Base) MCG/ACT inhaler INHALE 2 PUFFS BY MOUTH EVERY 6 HOURS AS NEEDED FOR SHORTNESS OF BREATH/DYSPNEA OR WHEEZING 54 g 3     ammonium lactate (LAC-HYDRIN) 12 % cream Apply topically 2 times daily as needed for dry skin 385 g 3     apixaban ANTICOAGULANT (ELIQUIS) 5 MG tablet Take 5 mg by mouth 2 times daily       aspirin (ASA) 81 MG chewable tablet Take 1 tablet (81 mg) by mouth daily 90 tablet 3     baclofen (LIORESAL) 10 MG tablet TAKE 2 TABLETS BY MOUTH THREE TIMES DAILY 180 tablet 8     blood glucose (ACCU-CHEK KARISHMA PLUS) test strip USE TO TEST BLOOD SUGARS ONCE DAILY 100 strip 1     hydrochlorothiazide (HYDRODIURIL) 25 MG tablet TAKE 1 TABLET (25 MG) BY MOUTH DAILY 90 tablet 3     insulin glargine (BASAGLAR KWIKPEN) 100 UNIT/ML pen Inject 26 Units Subcutaneous daily 24 mL 0     insulin pen needle (BD ROSE U/F) 32G X 4 MM miscellaneous Use daily and as directed. 100 each 3     LORazepam (ATIVAN) 1 MG tablet Take 1 tablet (1 mg) by mouth 3 times daily as needed for anxiety or sleep 42 tablet 0     promethazine (PHENERGAN) 25 MG tablet TAKE 1 TABLET (25MG) BY MOUTH THREE TIMES DAILY AS NEEDED FOR NAUSEA. 90 tablet 0     propranolol (INDERAL) 40 MG tablet TAKE 2 TABLETS (80 MG) BY MOUTH 2 TIMES DAILY 360 tablet 3     rosuvastatin (CRESTOR) 10 MG tablet TAKE 1 TABLET BY MOUTH EVERY DAY 90 tablet 3     rosuvastatin (CRESTOR) 20 MG tablet TAKE 1 TABLET BY MOUTH EVERY DAY 90 tablet 1     spironolactone (ALDACTONE) 25 MG tablet Take 1 tablet (25 mg) by mouth daily 30 tablet 3     sertraline (ZOLOFT) 100 MG tablet TAKE 2 TABLETS BY MOUTH EVERY  tablet 3        Allergies   Allergen Reactions     Levaquin Anaphylaxis and Rash     Swelling in lip and tongue felt thick     Lisinopril Anaphylaxis     Swollen tongue; inability to  swallow; drooling; hives; swollen face, neck, angioedema     Acetaminophen      Hx of cirrhosis      Amlodipine      Swelling, hives, possible angioedema       Morphine      b/p dropped and arms went numb  Hypotension     Quinolones Hives     Bactrim [Sulfamethoxazole W/Trimethoprim] Rash                       Thank you for allowing me to participate in the care of your patient.      Sincerely,     Markus Fox MD     [facility]  cc:   Joel Daniel Irwin Wegener, MD  [address] [address]

## 2021-09-15 ENCOUNTER — MYC MEDICAL ADVICE (OUTPATIENT)
Dept: FAMILY MEDICINE | Facility: CLINIC | Age: 61
End: 2021-09-15

## 2021-09-15 ENCOUNTER — PATIENT OUTREACH (OUTPATIENT)
Dept: CARE COORDINATION | Facility: CLINIC | Age: 61
End: 2021-09-15

## 2021-09-15 DIAGNOSIS — I10 HYPERTENSION GOAL BP (BLOOD PRESSURE) < 140/90: ICD-10-CM

## 2021-09-15 DIAGNOSIS — Z71.89 OTHER SPECIFIED COUNSELING: Primary | Chronic | ICD-10-CM

## 2021-09-15 RX ORDER — DOXAZOSIN 1 MG/1
1 TABLET ORAL AT BEDTIME
Qty: 30 TABLET | Refills: 3 | Status: SHIPPED | OUTPATIENT
Start: 2021-09-15 | End: 2021-11-30

## 2021-09-15 NOTE — TELEPHONE ENCOUNTER
Markus Fox MD Caswell, Mallory J, RN  Phone Number: [phone]     He can start on Cardura 1 mg nightly       Rx sent in; pt updated via [employer]. Spironolactone was discontinued and removed from list. -[facility]

## 2021-09-16 ENCOUNTER — HOSPITAL ENCOUNTER (OUTPATIENT)
Dept: ULTRASOUND IMAGING | Facility: CLINIC | Age: 61
Discharge: HOME | End: 2021-09-16
Attending: FAMILY MEDICINE | Admitting: FAMILY MEDICINE
Payer: MEDICARE

## 2021-09-16 ENCOUNTER — MYC MEDICAL ADVICE (OUTPATIENT)
Dept: FAMILY MEDICINE | Facility: CLINIC | Age: 61
End: 2021-09-16

## 2021-09-16 ENCOUNTER — PATIENT OUTREACH (OUTPATIENT)
Dept: NURSING | Facility: CLINIC | Age: 61
End: 2021-09-16
Payer: MEDICARE

## 2021-09-16 DIAGNOSIS — R55 NEAR SYNCOPE: ICD-10-CM

## 2021-09-16 DIAGNOSIS — R47.81 SLURRED SPEECH: ICD-10-CM

## 2021-09-16 PROCEDURE — 93880 EXTRACRANIAL BILAT STUDY: CPT

## 2021-09-16 PROCEDURE — 93880 EXTRACRANIAL BILAT STUDY: CPT | Mod: 26 | Performed by: RADIOLOGY

## 2021-09-16 ASSESSMENT — ACTIVITIES OF DAILY LIVING (ADL): DEPENDENT_IADLS:: CLEANING;COOKING;LAUNDRY;SHOPPING;MEAL PREPARATION;MEDICATION MANAGEMENT;MONEY MANAGEMENT

## 2021-09-16 NOTE — PROGRESS NOTES
Clinic Care Coordination Contact    Clinic Care Coordination Contact  OUTREACH    Referral Information:  Referral Source: PCP         Chief Complaint   Patient presents with     Clinic Care Coordination - Initial     Social work        Universal Utilization:      Utilization    Hospital Admissions  0             ED Visits  2             No Show Count (past year)  1                Current as of: 9/15/2021  7:18 PM              Clinical Concerns:  Current Medical Concerns:    Patient Active Problem List   Diagnosis     Generalized anxiety disorder     Type 2 diabetes, HbA1c goal < 7% (H)     Hyperlipidemia LDL goal <100     Hypertension goal BP (blood pressure) < 140/90     Major depressive disorder, recurrent episode, mild (H)     Chronic pain syndrome     GERD (gastroesophageal reflux disease)     Abnormal liver function tests     Health Care Home     Abnormal EMG     Hernia     Prolonged QT interval     Diastasis recti     Hypokalemia     Cauda equina syndrome with neurogenic bladder (H)     Wound infection     Cellulitis     Nausea without vomiting     Atopic dermatitis     Muscle spasms of Upper extremity     Edema of left lower extremity     Esophageal varices in cirrhosis (H)     Cirrhosis of liver (H)     Anemia due to blood loss, acute     Skin infection     Superficial thrombophlebitis of arm     Cellulitis of hand     Ganglion cyst     Dry skin dermatitis     Moderate persistent asthma     Open wound of right heel     Fall     Closed fracture of right patella     Right knee pain     Alcoholic cirrhosis of liver without ascites (H)     Lumbosacral radiculopathy     Calculus of gallbladder without cholecystitis without obstruction     Type 2 diabetes mellitus without complication, with long-term current use of insulin (H)     Wound, open, buttock, right     Type 2 diabetes mellitus with diabetic polyneuropathy, with long-term current use of insulin (H)     Benign neoplasm of skin of trunk, except scrotum      Hematemesis with nausea     Osteoarthritis of lumbar spine, unspecified spinal osteoarthritis complication status     S/P insertion of spinal cord stimulator     Gastroparesis     Alcoholism in remission (H)     Right ankle pain     Acquired calcaneus deformity of right ankle     H/O foot surgery     Calcific Achilles tendinitis         Current Behavioral Concerns: Patient has been identified as needing counseling      Education Provided to patient: Role of care coordination; offered outside psychology support but patient is on [facility] care through [facility] so he has politely declined to go anywhere else.      Health Maintenance Reviewed:    Clinical Pathway: None    Medication Management:  Medication review status: Medications reviewed and no changes reported per patient.             Functional Status:  Dependent ADLs:: Ambulation-walker  Dependent IADLs:: Cleaning, Cooking, Laundry, Shopping, Meal Preparation, Medication Management, Money Management  Mobility Status: Independent  Fallen 2 or more times in the past year?: No  Any fall with injury in the past year?: No    Living Situation:  Current living arrangement:: I live in a private home  Type of residence:: Apartment    Lifestyle & Psychosocial Needs:    Social Determinants of Health     Tobacco Use: Medium Risk     Smoking Tobacco Use: Former Smoker     Smokeless Tobacco Use: Never Used   Alcohol Use:      Frequency of Alcohol Consumption:      Average Number of Drinks:      Frequency of Binge Drinking:    Financial Resource Strain:      Difficulty of Paying Living Expenses:    Food Insecurity:      Worried About Running Out of Food in the Last Year:      Ran Out of Food in the Last Year:    Transportation Needs: No Transportation Needs     Lack of Transportation (Medical): No     Lack of Transportation (Non-Medical): No   Physical Activity: Inactive     Days of Exercise per Week: 0 days     Minutes of Exercise per Session: 0 min   Stress:      Feeling of  Stress :    Social Connections: Unknown     Frequency of Communication with Friends and Family: Not on file     Frequency of Social Gatherings with Friends and Family: Not on file     Attends Rastafarian Services: Not on file     Active Member of Clubs or Organizations: Not on file     Attends Club or Organization Meetings: Not on file     Marital Status:    Intimate Partner Violence:      Fear of Current or Ex-Partner:      Emotionally Abused:      Physically Abused:      Sexually Abused:    Depression: Not at risk     PHQ-2 Score: 0   Housing Stability:      Unable to Pay for Housing in the Last Year:      Number of Places Lived in the Last Year:      Unstable Housing in the Last Year:               Rastafarian or spiritual beliefs that impact treatment:: No  Mental health DX:: Yes  Mental health DX how managed:: Medication  Mental health management concern (GOAL):: No  Chemical Dependency Status: No Current Concerns  Informal Support system:: Friends        Patient returned call to [facility]. He shares that he needs to get in to see a counselor. He is not able to be seen until mid-December. Patient shares that transportation is also a major barrier for him and he is unable to walk without a scooter. Patient unable to finish assessment today as he had appt.     [facility] will put in referral to Behavioral Health Clinician to bridge the gap between now and when he is able to get an appt.    [facility] will finish assessment during next phone call and set goals at that time.       Resources and Interventions:  Current Resources:      Community Resources: None  Supplies used at home:: None  Equipment Currently Used at Home: none            Advance Care Plan/Directive  Advanced Care Plans/Directives on file:: No  Advanced Care Plan/Directive Status: Declined Further Information    Referrals Placed: Behavioral Health Providers     Goals:       Patient/Caregiver understanding: Patient verbalized understanding of the role of the  [facility].    Outreach Frequency: monthly  Future Appointments              Today [facility] M [facility], [facility]    Today HP CCC SW [facility],     In 1 week Wegener, Joel Daniel Irwin, MD [facility]    In 3 weeks Markus Fox MD [facility] [place], [facility]    In 2 months Sinyigaricardo, Speciose, LICSW [facility], [facility]    In 3 months Sinyigaya, Speciose, LICSW [facility], [facility]    In 5 months Kimberly Ramirez MD [facility]          Plan:[facility] will follow up with patient in one week. [facility] will put in referral for patient to see C as well to bridge the gap between now and when he is able to get counseling.    WILMER Coppola   [facility] Care Coordination  Tel: [phone]

## 2021-09-17 ENCOUNTER — MYC MEDICAL ADVICE (OUTPATIENT)
Dept: FAMILY MEDICINE | Facility: CLINIC | Age: 61
End: 2021-09-17

## 2021-09-17 DIAGNOSIS — I10 HYPERTENSION GOAL BP (BLOOD PRESSURE) < 140/90: ICD-10-CM

## 2021-09-18 ENCOUNTER — MYC MEDICAL ADVICE (OUTPATIENT)
Dept: FAMILY MEDICINE | Facility: CLINIC | Age: 61
End: 2021-09-18

## 2021-09-18 ENCOUNTER — OFFICE VISIT (OUTPATIENT)
Dept: URGENT CARE | Facility: URGENT CARE | Age: 61
End: 2021-09-18
Payer: MEDICARE

## 2021-09-18 ENCOUNTER — NURSE TRIAGE (OUTPATIENT)
Dept: NURSING | Facility: CLINIC | Age: 61
End: 2021-09-18

## 2021-09-18 VITALS
BODY MASS INDEX: 29.02 KG/M2 | HEART RATE: 67 BPM | TEMPERATURE: 97.7 F | WEIGHT: 214 LBS | OXYGEN SATURATION: 98 % | DIASTOLIC BLOOD PRESSURE: 95 MMHG | SYSTOLIC BLOOD PRESSURE: 156 MMHG

## 2021-09-18 DIAGNOSIS — T14.8XXA BLISTER OF SKIN: Primary | ICD-10-CM

## 2021-09-18 DIAGNOSIS — I10 HYPERTENSION GOAL BP (BLOOD PRESSURE) < 140/90: ICD-10-CM

## 2021-09-18 PROCEDURE — 99213 OFFICE O/P EST LOW 20 MIN: CPT | Performed by: FAMILY MEDICINE

## 2021-09-18 NOTE — PROGRESS NOTES
Assessment & Plan     Blister of skin    Early blistering of the skin -- coban and gauze square bandages provided. Okay to apply medihoney before wrapping. Caution with use of AFO brace. Patient has f/u appt with PCP in 6 days. Return PRN if symptoms worsen    Tom Case MD   [facility]    Madeleine Hood is a 60 year old male who presents to clinic today for the following health issues:  Chief Complaint   Patient presents with     Urgent Care     Ulcer     c/o ulcer on foot     HPI    Absent of fever or drainage. No skin injury seen    Complicated hx of cauda equina, dysfunction and achilles tendon detachment to this affected foot. Baseline has limited function. Wears an AFO brace on this foot believes the device aggravated his skin on this lateral side of midfoot causing the blister/ulcer    Type II diabetic. No hx of amputation.       Lab Results   Component Value Date    A1C 6.0 04/09/2021    A1C 5.8 07/20/2020    A1C 6.4 10/08/2019    A1C 6.8 04/02/2019    A1C 7.2 01/14/2019         Patient Active Problem List    Diagnosis Date Noted     Acquired calcaneus deformity of right ankle 05/06/2021     Priority: Medium     H/O foot surgery 05/06/2021     Priority: Medium     Calcific Achilles tendinitis 05/06/2021     Priority: Medium     Right ankle pain 10/20/2020     Priority: Medium     Added automatically from request for surgery [id number]       Gastroparesis 10/08/2019     Priority: Medium     Alcoholism in remission (H) 10/08/2019     Priority: Medium     Osteoarthritis of lumbar spine, unspecified spinal osteoarthritis complication status 09/17/2018     Priority: Medium     S/P insertion of spinal cord stimulator 09/17/2018     Priority: Medium     Hematemesis with nausea 06/30/2018     Priority: Medium     Benign neoplasm of skin of trunk, except scrotum 01/12/2018     Priority: Medium     Type 2 diabetes mellitus with diabetic polyneuropathy, with long-term current use of insulin (H)  05/09/2017     Priority: Medium     Wound, open, buttock, right 04/20/2017     Priority: Medium     Type 2 diabetes mellitus without complication, with long-term current use of insulin (H) 10/06/2016     Priority: Medium     Calculus of gallbladder without cholecystitis without obstruction 05/10/2016     Priority: Medium     Lumbosacral radiculopathy 02/10/2016     Priority: Medium     S/p SCS implant 2010- [employer]       Alcoholic cirrhosis of liver without ascites (H) 10/13/2015     Priority: Medium     Right knee pain 09/20/2015     Priority: Medium     Fall 07/11/2015     Priority: Medium     Closed fracture of right patella 07/11/2015     Priority: Medium     Diagnosis updated by automated process. Provider to review and confirm.       Open wound of right heel 12/23/2014     Priority: Medium     Moderate persistent asthma 09/04/2014     Priority: Medium     Ganglion cyst 06/04/2014     Priority: Medium     Dry skin dermatitis 06/04/2014     Priority: Medium     Superficial thrombophlebitis of arm 04/03/2014     Priority: Medium     Cellulitis of hand 04/03/2014     Priority: Medium     Skin infection 04/02/2014     Priority: Medium     Esophageal varices in cirrhosis (H) 04/01/2014     Priority: Medium     Hospitalized for UGI bleed 3/28/14, endoscopy revealed bleeding varices.       Cirrhosis of liver (H) 04/01/2014     Priority: Medium     Not biopsy-proven as of 4/1/14.       Anemia due to blood loss, acute 04/01/2014     Priority: Medium     Edema of left lower extremity 03/31/2014     Priority: Medium     Muscle spasms of Upper extremity 01/20/2014     Priority: Medium     Atopic dermatitis 01/17/2014     Priority: Medium     Nausea without vomiting 11/27/2013     Priority: Medium     Problem list name updated by automated process. Provider to review       Cellulitis 11/01/2013     Priority: Medium     Wound infection 08/05/2013     Priority: Medium     Cauda equina syndrome with neurogenic bladder (H)  07/06/2013     Priority: Medium     Hypokalemia 11/06/2012     Priority: Medium     Prolonged QT interval 11/05/2012     Priority: Medium     Diastasis recti 11/05/2012     Priority: Medium     Hernia 10/25/2012     Priority: Medium     Abnormal EMG 05/01/2012     Priority: Medium     Health Care Home 10/28/2011     Priority: Medium     EMERGENCY CARE PLAN  Presenting Problem Signs and Symptoms Treatment Plan    Questions or conerns during clinic hours    I will call the clinic directly [phone]     Questions or conerns outside clinic hours    I will call the 24 hour nurse line at [phone]    Patient needs to schedule an appointment    I will call the 24 hour scheduling team at [phone] or clinic directly    Same day treatment     I will call the clinic first, nurse line if after hours, urgent care and express care if needed                          DX V65.8 REPLACED WITH [id number] HEALTH CARE HOME (04/08/2013)       Abnormal liver function tests 06/14/2011     Priority: Medium     GERD (gastroesophageal reflux disease) 06/13/2011     Priority: Medium     Chronic pain syndrome 03/14/2011     Priority: Medium     Failed back surgery L5-S1   August 2009  Post-op complications: right dropped foot, bowel and bladder involvement    Patient is followed by WEGENER, JOEL DANIEL IRWIN for ongoing prescription of pain medication.  All refills should be approved by this provider, or covering partner.    Medication(s): oxycodone.   Maximum quantity per month: see sig  Clinic visit frequency required: Q 3 months     Controlled substance agreement on file: Yes       Date(s):  March 1, 2016      Pain Clinic evaluation in the past:      Date/Location:  non-[facility], spinal stimulator,  [facility] x one 2015.     DIRE Total Score(s):  No flowsheet data found.    Last [facility] website verification: March 1, 2016     [email or web address]         Major depressive disorder, recurrent episode, mild (H) 12/13/2010     Priority:  Medium     Hypertension goal BP (blood pressure) < 140/90 12/06/2010     Priority: Medium     Type 2 diabetes, HbA1c goal < 7% (H) 10/31/2010     Priority: Medium     Per provider         Hyperlipidemia LDL goal <100 10/31/2010     Priority: Medium     Per provider         Generalized anxiety disorder      Priority: Medium       Current Outpatient Medications   Medication     ACE/ARB NOT PRESCRIBED, INTENTIONAL,     albuterol (PROAIR HFA/PROVENTIL HFA/VENTOLIN HFA) 108 (90 Base) MCG/ACT inhaler     ammonium lactate (LAC-HYDRIN) 12 % cream     apixaban ANTICOAGULANT (ELIQUIS) 5 MG tablet     aspirin (ASA) 81 MG chewable tablet     baclofen (LIORESAL) 10 MG tablet     blood glucose (ACCU-CHEK KARISHMA PLUS) test strip     doxazosin (CARDURA) 1 MG tablet     hydrochlorothiazide (HYDRODIURIL) 25 MG tablet     insulin glargine (BASAGLAR KWIKPEN) 100 UNIT/ML pen     insulin pen needle (BD ROSE U/F) 32G X 4 MM miscellaneous     LORazepam (ATIVAN) 1 MG tablet     promethazine (PHENERGAN) 25 MG tablet     propranolol (INDERAL) 40 MG tablet     rosuvastatin (CRESTOR) 10 MG tablet     rosuvastatin (CRESTOR) 20 MG tablet     sertraline (ZOLOFT) 100 MG tablet     No current facility-administered medications for this visit.           Objective    BP (!) 156/95   Pulse 67   Temp 97.7  F (36.5  C) (Tympanic)   Wt 97.1 kg (214 lb)   SpO2 98%   BMI 29.02 kg/m    Physical Exam   Right lateral foot : at mid foot area a blistered area of skin no deep ulcer seen. No active drainage. Approximately size of a dime              No results found for any visits on 09/18/21.                  The use of Dragon/yourdelivery dictation services may have been used to construct the content in this note; any grammatical or spelling errors are non-intentional. Please contact the author of this note directly if you are in need of any clarification.

## 2021-09-18 NOTE — PATIENT INSTRUCTIONS
Follow-up appointment with wound care team or primary care in the next 1-2 weeks      If symptoms worsen at any point please return to be evaluated      Try medihoney to the area and cover with gauze and coban as provided to you

## 2021-09-18 NOTE — TELEPHONE ENCOUNTER
"Pt reports a new bright red ulcer on the side of his foot.  Pt describes a \"hole inside\" and the whole area being about 2.5 inches in size.  He wears an AFO brace for foot drop and when he took the brace off last night, he noticed this ulcer.  He believes it's been present for a few days.      He denies a fever.  He is unable to feel pain in his LE as his baseline.      Triage disposition:  See a provider within 4 hours, UC advised.  Patient verbalized understanding and had no further questions.      COVID 19 Nurse Triage Plan/Patient Instructions    Please be aware that novel coronavirus (COVID-19) may be circulating in the community. If you develop symptoms such as fever, cough, or SOB or if you have concerns about the presence of another infection including coronavirus (COVID-19), please contact your health care provider or visit [email or web address].     Disposition/Instructions    In-Person Visit with provider recommended. Reference Visit Selection Guide.    Thank you for taking steps to prevent the spread of this virus.  o Limit your contact with others.  o Wear a simple mask to cover your cough.  o Wash your hands well and often.    Resources    [facility]: About COVID-19: [email or web address]    CDC: What to Do If You're Sick: www.cdc.gov/coronavirus/2019-ncov/about/steps-when-sick.html    CDC: Ending Home Isolation: www.cdc.gov/coronavirus/2019-ncov/hcp/disposition-in-home-patients.html     CDC: Caring for Someone: www.cdc.gov/coronavirus/2019-ncov/if-you-are-sick/care-for-someone.html     [facility]: Interim Guidance for Hospital Discharge to Home: www.health.[facility].[place]/diseases/coronavirus/hcp/hospdischarge.pdf    [facility] clinical trials (COVID-19 research studies): [email or web address].[email or web address]/umn-clinical-trials     Below are the COVID-19 hotlines at the [place] Department of Health ([facility]). Interpreters are available.   o For health questions: Call 024-161-8725 or " 1-297.111.1769 (7 a.m. to 7 p.m.)  o For questions about schools and childcare: Call 084-119-0723 or 1-328.554.8514 (7 a.m. to 7 p.m.)               Maryann Landeros RN/HIEN        Reason for Disposition    [1] Looks infected (spreading redness, pus) AND [2] large red area (> 2 in. or 5 cm)    Additional Information    Negative: Sounds like a life-threatening emergency to the triager    Negative: Patient sounds very sick or weak to the triager    Negative: [1] Red area or streak AND [2] fever    Protocols used: SORES-A-INGE

## 2021-09-20 ENCOUNTER — MYC MEDICAL ADVICE (OUTPATIENT)
Dept: FAMILY MEDICINE | Facility: CLINIC | Age: 61
End: 2021-09-20

## 2021-09-20 DIAGNOSIS — I10 HYPERTENSION GOAL BP (BLOOD PRESSURE) < 140/90: ICD-10-CM

## 2021-09-20 RX ORDER — PROPRANOLOL HYDROCHLORIDE 40 MG/1
80 TABLET ORAL 2 TIMES DAILY
Qty: 360 TABLET | Refills: 3 | Status: SHIPPED | OUTPATIENT
Start: 2021-09-20 | End: 2021-11-01

## 2021-09-20 NOTE — TELEPHONE ENCOUNTER
JW,    Routing refill request to provider for review/approval because:  Labs out of range:  BP out of range.  Please authorize if appropriate.  Thanks,  Rama Morataya RN    Due for apt for anxiety- added in pharm comments.    Return in about 2 weeks (around 9/24/2021).Return in about 2 weeks (around 9/24/2021).

## 2021-09-21 ENCOUNTER — TELEPHONE (OUTPATIENT)
Dept: BEHAVIORAL HEALTH | Facility: CLINIC | Age: 61
End: 2021-09-21

## 2021-09-21 ENCOUNTER — MYC MEDICAL ADVICE (OUTPATIENT)
Dept: FAMILY MEDICINE | Facility: CLINIC | Age: 61
End: 2021-09-21

## 2021-09-21 RX ORDER — HYDROCHLOROTHIAZIDE 25 MG/1
TABLET ORAL
Qty: 90 TABLET | Refills: 0 | Status: SHIPPED | OUTPATIENT
Start: 2021-09-21 | End: 2021-10-07

## 2021-09-21 RX ORDER — PROPRANOLOL HYDROCHLORIDE 40 MG/1
TABLET ORAL
Refills: 0
Start: 2021-09-21

## 2021-09-21 NOTE — TELEPHONE ENCOUNTER
Medication is being filled for 1 time refill only due to:  Patient needs to be seen because due for a physical with Dr. Wegener.

## 2021-09-22 ENCOUNTER — MYC MEDICAL ADVICE (OUTPATIENT)
Dept: FAMILY MEDICINE | Facility: CLINIC | Age: 61
End: 2021-09-22

## 2021-09-22 DIAGNOSIS — I48.0 PAROXYSMAL ATRIAL FIBRILLATION (H): Primary | ICD-10-CM

## 2021-09-22 NOTE — TELEPHONE ENCOUNTER
Clinic care coordinator reached out to [facility] to assist in bridging patient next 2 months until connect with [facility] therapist.  Patient is with [facility] and not with [facility] but agreed to follow-up in [place].    [facility] reach out to patient explained the role.  Patient provided brief history.  Patient reports in 2009 he had back surgery which accidentally punctured his spinal cord.  Patient reports he has experienced chronic back pain since this time.  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  Patient reports he was adjusting to his new quality of life.  Patient reports in 2018 he received pain management implant device.  Patient adds in 2019 his partner of 15 years became seriously ill which led to her being hospitalized for 8 months.  Patient adds this was during COVID.  Patient reports he had to sell their house and moved to assisted living due to both her limited mobility.  Patient reports he has a bone spur in his right leg.  Patient reports he is consulted with several different surgeons who state it is not operable.  Patient reports he wears a brace but this causes additional sores and barriers to his mobility.  Patient reports he feels stuck medically.  Patient shared that his brother has had diabetes for most of his life and it is a double amputee but has been able to move on with his life.  Patient reports he is not eligible for amputation.  Patient reports for much of his life is no athletic person but now his mobility is significantly limited.    Patient reports he takes medical cannabis which he feels manages 20% of his overall pain.  Patient has his PCP prescribed Ativan to him which he feels reduces some of his anxiety.    Patient reports he is seeking a therapist to help adjust his expectations of quality of life.  Provided validation and support to patient as above history.    Plan   initial intake was scheduled in  1 week

## 2021-09-22 NOTE — TELEPHONE ENCOUNTER
Routing refill request to provider for review/approval because:  Medication is reported/historical  Randi BARRIOS RN

## 2021-09-23 DIAGNOSIS — R11.0 NAUSEA WITHOUT VOMITING: ICD-10-CM

## 2021-09-23 DIAGNOSIS — K31.84 GASTROPARESIS: ICD-10-CM

## 2021-09-23 RX ORDER — PROMETHAZINE HYDROCHLORIDE 25 MG/1
TABLET ORAL
Qty: 90 TABLET | Refills: 0 | Status: SHIPPED | OUTPATIENT
Start: 2021-09-23 | End: 2021-10-20

## 2021-09-24 ENCOUNTER — PATIENT OUTREACH (OUTPATIENT)
Dept: CARE COORDINATION | Facility: CLINIC | Age: 61
End: 2021-09-24

## 2021-09-24 NOTE — PROGRESS NOTES
Clinic Care Coordination Contact    Situation: Patient chart reviewed by care coordinator.    Background: Patient is enrolled in care coordination    Assessment: [facility] and patient set goal for patient to work with [facility] until able to get into therapy in December. Patient will not go anywhere but [facility]. According to chart, patient is meeting goal & working with [facility]    Plan/Recommendations: [facility] to contact in 1 month.    WILMER Coppola   [facility] Care Coordination  Tel: [phone]

## 2021-09-27 ENCOUNTER — MYC MEDICAL ADVICE (OUTPATIENT)
Dept: FAMILY MEDICINE | Facility: CLINIC | Age: 61
End: 2021-09-27

## 2021-09-27 ENCOUNTER — VIRTUAL VISIT (OUTPATIENT)
Dept: BEHAVIORAL HEALTH | Facility: CLINIC | Age: 61
End: 2021-09-27
Payer: MEDICARE

## 2021-09-27 DIAGNOSIS — F06.4 ANXIETY DISORDER DUE TO MEDICAL CONDITION: Primary | ICD-10-CM

## 2021-09-27 DIAGNOSIS — F51.04 PSYCHOPHYSIOLOGICAL INSOMNIA: ICD-10-CM

## 2021-09-27 DIAGNOSIS — F41.9 ANXIETY: ICD-10-CM

## 2021-09-27 DIAGNOSIS — F43.22 ADJUSTMENT DISORDER WITH ANXIOUS MOOD: ICD-10-CM

## 2021-09-27 PROCEDURE — 90834 PSYTX W PT 45 MINUTES: CPT | Mod: 95 | Performed by: SOCIAL WORKER

## 2021-09-27 ASSESSMENT — ANXIETY QUESTIONNAIRES
2. NOT BEING ABLE TO STOP OR CONTROL WORRYING: NEARLY EVERY DAY
8. IF YOU CHECKED OFF ANY PROBLEMS, HOW DIFFICULT HAVE THESE MADE IT FOR YOU TO DO YOUR WORK, TAKE CARE OF THINGS AT HOME, OR GET ALONG WITH OTHER PEOPLE?: EXTREMELY DIFFICULT
4. TROUBLE RELAXING: NEARLY EVERY DAY
6. BECOMING EASILY ANNOYED OR IRRITABLE: SEVERAL DAYS
7. FEELING AFRAID AS IF SOMETHING AWFUL MIGHT HAPPEN: NEARLY EVERY DAY
1. FEELING NERVOUS, ANXIOUS, OR ON EDGE: NEARLY EVERY DAY
GAD7 TOTAL SCORE: 17
7. FEELING AFRAID AS IF SOMETHING AWFUL MIGHT HAPPEN: NEARLY EVERY DAY
5. BEING SO RESTLESS THAT IT IS HARD TO SIT STILL: SEVERAL DAYS
GAD7 TOTAL SCORE: 17
3. WORRYING TOO MUCH ABOUT DIFFERENT THINGS: NEARLY EVERY DAY
GAD7 TOTAL SCORE: 17

## 2021-09-27 ASSESSMENT — PATIENT HEALTH QUESTIONNAIRE - PHQ9
10. IF YOU CHECKED OFF ANY PROBLEMS, HOW DIFFICULT HAVE THESE PROBLEMS MADE IT FOR YOU TO DO YOUR WORK, TAKE CARE OF THINGS AT HOME, OR GET ALONG WITH OTHER PEOPLE: SOMEWHAT DIFFICULT
SUM OF ALL RESPONSES TO PHQ QUESTIONS 1-9: 8
SUM OF ALL RESPONSES TO PHQ QUESTIONS 1-9: 8

## 2021-09-27 NOTE — Clinical Note
Good morning Demario,    I had the pleasure of meeting with Erwin on Monday.  He does have a complex medical history of chronic pain. He really appreciates your ongoing support.  I attempted to discuss different behavioral interventions that can help with reducing his anticipatory anxiety but he was focused on a medical intervention of Ativan.  He asked that I forward my note on to you.  I believe he was going to reach out to you.    I had recommended a pain psychologist referral but he declined.    Thank you for the referral.     All the best,    Favian.    TINO Ugarte, [facility]  [address] [place] [id number]  Clinic # [phone]  Direct [phone]  Gender pronouns: he/him

## 2021-09-28 DIAGNOSIS — I10 HYPERTENSION GOAL BP (BLOOD PRESSURE) < 140/90: ICD-10-CM

## 2021-09-28 DIAGNOSIS — K70.30 ALCOHOLIC CIRRHOSIS OF LIVER WITHOUT ASCITES (H): Primary | ICD-10-CM

## 2021-09-28 ASSESSMENT — PATIENT HEALTH QUESTIONNAIRE - PHQ9: SUM OF ALL RESPONSES TO PHQ QUESTIONS 1-9: 8

## 2021-09-28 ASSESSMENT — ANXIETY QUESTIONNAIRES: GAD7 TOTAL SCORE: 17

## 2021-09-29 DIAGNOSIS — Z79.4 TYPE 2 DIABETES MELLITUS WITHOUT COMPLICATION, WITH LONG-TERM CURRENT USE OF INSULIN (H): ICD-10-CM

## 2021-09-29 DIAGNOSIS — E11.9 TYPE 2 DIABETES MELLITUS WITHOUT COMPLICATION, WITH LONG-TERM CURRENT USE OF INSULIN (H): ICD-10-CM

## 2021-09-29 RX ORDER — PROPRANOLOL HYDROCHLORIDE 40 MG/1
TABLET ORAL
Start: 2021-09-29

## 2021-09-29 RX ORDER — PEN NEEDLE, DIABETIC 32GX 5/32"
NEEDLE, DISPOSABLE MISCELLANEOUS
Qty: 100 EACH | Refills: 2 | Status: SHIPPED | OUTPATIENT
Start: 2021-09-29 | End: 2022-08-16

## 2021-09-29 ASSESSMENT — COLUMBIA-SUICIDE SEVERITY RATING SCALE - C-SSRS
2. HAVE YOU ACTUALLY HAD ANY THOUGHTS OF KILLING YOURSELF?: NO
1. IN THE PAST MONTH, HAVE YOU WISHED YOU WERE DEAD OR WISHED YOU COULD GO TO SLEEP AND NOT WAKE UP?: NO
3. HAVE YOU BEEN THINKING ABOUT HOW YOU MIGHT KILL YOURSELF?: NO
4. HAVE YOU HAD THESE THOUGHTS AND HAD SOME INTENTION OF ACTING ON THEM?: NO
ATTEMPT LIFETIME: NO
ATTEMPT PAST THREE MONTHS: NO
4. HAVE YOU HAD THESE THOUGHTS AND HAD SOME INTENTION OF ACTING ON THEM?: NO
1. IN THE PAST MONTH, HAVE YOU WISHED YOU WERE DEAD OR WISHED YOU COULD GO TO SLEEP AND NOT WAKE UP?: NO
2. HAVE YOU ACTUALLY HAD ANY THOUGHTS OF KILLING YOURSELF LIFETIME?: NO

## 2021-09-29 NOTE — TELEPHONE ENCOUNTER
Pen needles:    Prescription approved per [facility] Refill Protocol.  Isela Villar RN

## 2021-09-29 NOTE — PROGRESS NOTES
[facility]  2021    Behavioral Health Clinician Progress Note    Voice recognition technology may have been utilized for some of the information in this medical record.      Patient Name: Ty Aguilar         Service Type: Individual           Service Location:  Face to Face in Home / Community      Session Start Time:  100pm  Session End Time: 150pm      Session Length: 38 - 52      Attendees: Client    Visit Activities (Refresh list every visit): NEW    Video Visit:      Provider verified identity through the following two step process.  Patient provided:  Patient photo and Patient     Telemedicine Visit: The patient's condition can be safely assessed and treated via synchronous audio and visual telemedicine encounter.      Reason for Telemedicine Visit: Services only offered telehealth    Originating Site (Patient Location): Patient's home    Distant Site (Provider Location): Provider Remote Setting- Home Office    Consent:  The patient/guardian has verbally consented to: the potential risks and benefits of telemedicine (video visit) versus in person care; bill my insurance or make self-payment for services provided; and responsibility for payment of non-covered services.     Patient would like the video invitation sent by:  Send to e-mail at: [email or web address]    Mode of Communication:  Video Conference via CX    As the provider I attest to compliance with applicable laws and regulations related to telemedicine.    Diagnostic Assessment Date: To be completed  Treatment Plan Review Date: To be completed      Depression and Anxiety Follow-Up    PHQ-9 ([employer]) 10/20/2020 2021 2021   No Interest In Doing Things - - -   Feeling Depressed - - -   Trouble Sleeping - - -   Tired / No Energy - - -   No appetite or Over-Eating - - -   Feeling Bad about Self - - -   Trouble Concentrating - - -   Moving Slow or Restless - - -   Suicidal Thoughts  - - -   Total Score - - -   1.  Little interest or pleasure in doing things 0 3 2   2.  Feeling down, depressed, or hopeless 2 0 0   3.  Trouble falling or staying asleep, or sleeping too much 2 3 3   4.  Feeling tired or having little energy 3 3 1   5.  Poor appetite or overeating 0 0 2   6.  Feeling bad about yourself 0 0 0   7.  Trouble concentrating 1 0 0   8.  Moving slowly or restless 0 0 0   9.  Suicidal or self-harm thoughts 0 0 0   PHQ-9 Total Score 8 9 8   Difficulty at work, home, or with people - - -   1.  Little interest or pleasure in doing things - Nearly every day More than half the days   2.  Feeling down, depressed, or hopeless - Not at all Not at all   3.  Trouble falling or staying asleep, or sleeping too much - Nearly every day Nearly every day   4.  Feeling tired or having little energy - Nearly every day Several days   5.  Poor appetite or overeating - Not at all More than half the days   6.  Feeling bad about yourself - Not at all Not at all   7.  Trouble concentrating - Not at all Not at all   8.  Moving slowly or restless - Not at all Not at all   9.  Suicidal or self-harm thoughts - Not at all Not at all   PHQ-9 via [employer] TOTAL SCORE-----> - 9 (Mild depression) 8 (Mild depression)   Difficulty at work, home, or with people - - Somewhat difficult     JATIN-7   [employer], 2002; Used with Permission) 4/2/2019 4/27/2021 9/27/2021   Over the last 2 weeks, how often have you been bothered by feeling nervous, anxious or on edge? - - -   Over the last 2 weeks, how often have you been bothered by not being able to stop or control worrying? - - -   Over the last 2 weeks, how often have you been bothered by worrying too much about different things? - - -   Over the last 2 weeks, how often have you been bothered by trouble relaxing? - - -   Over the last 2 weeks, how often have you been bothered by being so restless that it is hard to sit still? - - -   Over the last 2 weeks, how often have you been  bothered by becoming easily annoyed or irritable? - - -   Over the last 2 weeks, how often have you been bothered by feeling afraid as if something awful might happen? - - -   JATIN-7 Total Score =  - - -   1. Feeling nervous, anxious, or on edge - More than half the days Nearly every day   2. Not being able to stop or control worrying - Not at all Nearly every day   3. Worrying too much about different things - Not at all Nearly every day   4. Trouble relaxing - More than half the days Nearly every day   5. Being so restless that it is hard to sit still - More than half the days Several days   6. Becoming easily annoyed or irritable - More than half the days Several days   7. Feeling afraid, as if something awful might happen - Not at all Nearly every day   JATIN 7 TOTAL SCORE - 8 (mild anxiety) 17 (severe anxiety)   1. Feeling nervous, anxious, or on edge 0 2 3   2. Not being able to stop or control worrying 1 0 3   3. Worrying too much about different things 1 0 3   4. Trouble relaxing 0 2 3   5. Being so restless that it is hard to sit still 0 2 1   6. Becoming easily annoyed or irritable 0 2 1   7. Feeling afraid, as if something awful might happen 0 0 3   JATIN-7 Total Score 2 8 17   If you checked any problems, how difficult have they made it for you to do your work, take care of things at home, or get along with other people? Not difficult at all - -       YANIQUE LEVEL:  YANIQUE Score (Last Two) 3/14/2011   YANIQUE Raw Score 49   Activation Score 82.8   YANIQUE Level 4       DATA  Extended Session (60+ minutes): No  Interactive Complexity: No  Crisis: No  [facility] Patient No    Treatment Objective(s) Addressed in This Session:  Target Behavior(s):  Anxiety: will experience a reduction in anxiety, will develop more effective coping skills to manage anxiety symptoms, will develop healthy cognitive patterns and beliefs and will increase ability to function adaptively      Current Stressors / Issues:    Patient is a 60-year-old male with  "significant history of chronic pain that was referred to the [facility] by his primary care physician.  Please see initial telephone consultation for further information.  Patient was ambivalent about counseling so initial diagnostic assessment was not completed at this time.  Rather, exploring patient's current symptoms and different treatment options were discussed.    Patient identified 2 primary medical issues that are impacting his quality of life.  Patient reports he has uncontrolled nausea due to underlying GI issues.  Patient reports he wakes up each morning in the with nausea.  Patient reports he was prescribed medical marijuana in April 2021 which has improved the symptoms.  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  Please see epic for further information regarding patient's medical issues.  Patient reports any pressure on his foot causes tremendous pain.  Patient reports he spoke with a different surgeons and has been told no one can operate on it.  Patient reports because his foot is \"viable,\" he is unable to have it amputated.  Patient feels that the medical marijuana takes away \"20%\" of the pain.  Patient reports he has a foot brace but now has developed an open wound on the bottom of his foot.      Patient reports he experiences severe anxiety to the anticipation of pain when he is resting or when he gets up to move.  Patient completed PHQ 9 with a overall score of 8 and JATIN 7 with overall score of 18.  Patient denies current or past suicidal thoughts.  Patient has a history of alcohol abuse but is now sober.  Patient denies a history depression or anxiety prior to the above medical concerns.  Patient reports that he is wanting to reduce the anticipatory anxiety that he is experiencing all day.  Counseled patient about anxiety and different interventions including mindfulness that is shown some " improvement with chronic pain.  In addition, discussed with patient a pain psychologist referral at the [facility] pain clinic.  Patient states he is not interested in biofeedback or mindfulness and was seeking a prescription of Ativan.  Patient ports he received Ativan for 2 weeks in the past which provided him some relief.  Patient reports he is unable to be prescribed Ativan due to his prescription for medical cannabis.  Patient reports he plans to follow-up with his PCP for a temporary prescription of Ativan.  Counseled patient that as a [facility], unable to prescribe medications.  Patient declined to schedule a follow-up appointment.  [facility] offered to forward note to his primary care physician, Dr. Joell Wegener at the [facility].    Patient reports that his partner experience significant medical issues in June 2021 which required them to move into assisted living.  Patient reports both he and his partner are financially stable.  Patient ports he has an Ebike that he is ridden this summer over 1200 miles.    Progress on Treatment Objective(s) / Homework:  Minimal progress - CONTEMPLATION (Considering change and yet undecided); Intervened by assessing the negative and positive thinking (ambivalence) about behavior change    Motivational Interviewing    MI Intervention: Supported Autonomy, Collaboration, Evocation, Permission to raise concern or advise and Open-ended questions     Change Talk Expressed by the Patient: Reasons to change    Provider Response to Change Talk: E - Evoked more info from patient about behavior change, A - Affirmed patient's thoughts, decisions, or attempts at behavior change, R - Reflected patient's change talk and S - Summarized patient's change talk statements      PSYCHODYMANIC PSYCHOTHERAPY: Discussed patient's emotional dynamics and issues and how they impact behaviors,Explored patient's history of relationships and how they impact present behaviors, Explored  how to work with and make changes in these schemas and patterns    Care Plan review completed: No    Medication Review:  No changes to current psychiatric medication(s)    Medication Compliance:  Yes    Changes in Health Issues:   None reported    Chemical Use Review:   Substance Use: Chemical use reviewed, no active concerns identified      Tobacco Use: No current tobacco use.      Assessment: Current Emotional / Mental Status (status of significant symptoms):  Risk status (Self / Other harm or suicidal ideation)  Patient denies a history of suicidal ideation, suicide attempts, self-injurious behavior, homicidal ideation, homicidal behavior and and other safety concerns  Patient denies current fears or concerns for personal safety.  Patient denies current or recent suicidal ideation or behaviors.  Patient denies current or recent homicidal ideation or behaviors.  Patient denies current or recent self injurious behavior or ideation.  Patient denies other safety concerns.  A safety and risk management plan has not been developed at this time, however patient was encouraged to call [place] [phone] should there be a change in any of these risk factors.    [place] Suicide Severity Rating Scale (Lifetime/Recent)  [place] Suicide Severity Rating (Lifetime/Recent) 9/29/2021   1. Wish to be Dead (Lifetime) No   1. Wish to be Dead (Recent) No   2. Non-Specific Active Suicidal Thoughts (Lifetime) No   2. Non-Specific Active Suicidal Thoughts (Recent) No   3. Active Suicidal Ideation with any Methods (Not Plan) Without Intent to Act (Lifetime) No   3. Active Suicidal Ideation with any Methods (Not Plan) Without Intent to Act (Recent) No   4. Active Suicidal Ideation with Some Intent to Act, Without Specific Plan (Lifetime) No   4. Active Suicidal Ideation with Some Intent to Act, Without Specific Plan (Recent) No   Actual Attempt (Lifetime) No   Actual Attempt (Past 3 Months) No         Appearance:   Appropriate   Eye  Contact:   Fair   Psychomotor Behavior: Normal   Attitude:   Cooperative   Orientation:   All  Speech   Rate / Production: Normal    Volume:  Normal   Mood:    Anxious  Normal Agitated  Affect:    Appropriate  Flat   Thought Content:  Clear   Thought Form:  Coherent  Logical   Insight:    Fair     Diagnoses:  1. Anxiety disorder due to medical condition        Collateral Reports Completed:  Routed note to PCP    Plan: (Homework, other):  Patient was given information about behavioral services and encouraged to schedule a follow up appointment with the clinic [facility] [place] as needed.  He was also given [email or web address]: information about mental health symptoms and treatment options  CD Recommendations: Maintain Sobriety.  YAMEL Anderson, [facility] [place]

## 2021-09-30 RX ORDER — LORAZEPAM 1 MG/1
1 TABLET ORAL 2 TIMES DAILY PRN
Qty: 60 TABLET | Refills: 0 | Status: SHIPPED | OUTPATIENT
Start: 2021-09-30 | End: 2021-10-26

## 2021-10-04 ENCOUNTER — LAB (OUTPATIENT)
Dept: LAB | Facility: CLINIC | Age: 61
End: 2021-10-04
Payer: MEDICARE

## 2021-10-04 ENCOUNTER — ANCILLARY PROCEDURE (OUTPATIENT)
Dept: ULTRASOUND IMAGING | Facility: CLINIC | Age: 61
End: 2021-10-04
Attending: INTERNAL MEDICINE
Payer: MEDICARE

## 2021-10-04 DIAGNOSIS — K70.30 ALCOHOLIC CIRRHOSIS OF LIVER WITHOUT ASCITES (H): ICD-10-CM

## 2021-10-04 DIAGNOSIS — R11.0 NAUSEA: ICD-10-CM

## 2021-10-04 DIAGNOSIS — Z79.4 TYPE 2 DIABETES MELLITUS WITHOUT COMPLICATION, WITH LONG-TERM CURRENT USE OF INSULIN (H): ICD-10-CM

## 2021-10-04 DIAGNOSIS — E11.9 TYPE 2 DIABETES MELLITUS WITHOUT COMPLICATION, WITH LONG-TERM CURRENT USE OF INSULIN (H): ICD-10-CM

## 2021-10-04 LAB
ERYTHROCYTE [DISTWIDTH] IN BLOOD BY AUTOMATED COUNT: 12.6 % (ref 10–15)
HBA1C MFR BLD: 5.6 % (ref 0–5.6)
HCT VFR BLD AUTO: 37.9 % (ref 40–53)
HGB BLD-MCNC: 13.6 G/DL (ref 13.3–17.7)
INR PPP: 1.23 (ref 0.85–1.15)
MCH RBC QN AUTO: 30.6 PG (ref 26.5–33)
MCHC RBC AUTO-ENTMCNC: 35.9 G/DL (ref 31.5–36.5)
MCV RBC AUTO: 85 FL (ref 78–100)
PLATELET # BLD AUTO: 276 10E3/UL (ref 150–450)
RBC # BLD AUTO: 4.44 10E6/UL (ref 4.4–5.9)
WBC # BLD AUTO: 8.5 10E3/UL (ref 4–11)

## 2021-10-04 PROCEDURE — 36415 COLL VENOUS BLD VENIPUNCTURE: CPT

## 2021-10-04 PROCEDURE — 85027 COMPLETE CBC AUTOMATED: CPT

## 2021-10-04 PROCEDURE — 82248 BILIRUBIN DIRECT: CPT

## 2021-10-04 PROCEDURE — 83036 HEMOGLOBIN GLYCOSYLATED A1C: CPT

## 2021-10-04 PROCEDURE — 80053 COMPREHEN METABOLIC PANEL: CPT

## 2021-10-04 PROCEDURE — 85610 PROTHROMBIN TIME: CPT

## 2021-10-04 PROCEDURE — 76700 US EXAM ABDOM COMPLETE: CPT | Mod: GC | Performed by: STUDENT IN AN ORGANIZED HEALTH CARE EDUCATION/TRAINING PROGRAM

## 2021-10-05 ENCOUNTER — MYC MEDICAL ADVICE (OUTPATIENT)
Dept: FAMILY MEDICINE | Facility: CLINIC | Age: 61
End: 2021-10-05

## 2021-10-05 DIAGNOSIS — I10 HYPERTENSION GOAL BP (BLOOD PRESSURE) < 140/90: ICD-10-CM

## 2021-10-05 DIAGNOSIS — E87.6 HYPOKALEMIA: Primary | ICD-10-CM

## 2021-10-05 LAB
ALBUMIN SERPL-MCNC: 4 G/DL (ref 3.4–5)
ALP SERPL-CCNC: 130 U/L (ref 40–150)
ALT SERPL W P-5'-P-CCNC: 22 U/L (ref 0–70)
ANION GAP SERPL CALCULATED.3IONS-SCNC: 6 MMOL/L (ref 3–14)
AST SERPL W P-5'-P-CCNC: 13 U/L (ref 0–45)
BILIRUB DIRECT SERPL-MCNC: 0.2 MG/DL (ref 0–0.2)
BILIRUB SERPL-MCNC: 0.7 MG/DL (ref 0.2–1.3)
BUN SERPL-MCNC: 8 MG/DL (ref 7–30)
CALCIUM SERPL-MCNC: 9.5 MG/DL (ref 8.5–10.1)
CHLORIDE BLD-SCNC: 104 MMOL/L (ref 94–109)
CO2 SERPL-SCNC: 26 MMOL/L (ref 20–32)
CREAT SERPL-MCNC: 0.72 MG/DL (ref 0.66–1.25)
GFR SERPL CREATININE-BSD FRML MDRD: >90 ML/MIN/1.73M2
GLUCOSE BLD-MCNC: 90 MG/DL (ref 70–99)
POTASSIUM BLD-SCNC: 3.1 MMOL/L (ref 3.4–5.3)
PROT SERPL-MCNC: 7.7 G/DL (ref 6.8–8.8)
SODIUM SERPL-SCNC: 136 MMOL/L (ref 133–144)

## 2021-10-06 RX ORDER — POTASSIUM CHLORIDE 1500 MG/1
20 TABLET, EXTENDED RELEASE ORAL DAILY
Qty: 90 TABLET | Refills: 3 | Status: SHIPPED | OUTPATIENT
Start: 2021-10-06 | End: 2021-10-13

## 2021-10-07 RX ORDER — HYDROCHLOROTHIAZIDE 25 MG/1
TABLET ORAL
Qty: 90 TABLET | Refills: 0 | Status: SHIPPED | OUTPATIENT
Start: 2021-10-07 | End: 2021-10-13

## 2021-10-13 ENCOUNTER — OFFICE VISIT (OUTPATIENT)
Dept: CARDIOLOGY | Facility: CLINIC | Age: 61
End: 2021-10-13
Attending: INTERNAL MEDICINE
Payer: MEDICARE

## 2021-10-13 VITALS
WEIGHT: 210 LBS | DIASTOLIC BLOOD PRESSURE: 72 MMHG | SYSTOLIC BLOOD PRESSURE: 182 MMHG | RESPIRATION RATE: 18 BRPM | OXYGEN SATURATION: 99 % | HEART RATE: 67 BPM | BODY MASS INDEX: 28.48 KG/M2

## 2021-10-13 DIAGNOSIS — I48.0 PAROXYSMAL ATRIAL FIBRILLATION (H): ICD-10-CM

## 2021-10-13 DIAGNOSIS — I10 HYPERTENSION GOAL BP (BLOOD PRESSURE) < 140/90: ICD-10-CM

## 2021-10-13 PROCEDURE — 99214 OFFICE O/P EST MOD 30 MIN: CPT | Performed by: INTERNAL MEDICINE

## 2021-10-13 RX ORDER — TRIAMTERENE AND HYDROCHLOROTHIAZIDE 37.5; 25 MG/1; MG/1
1 CAPSULE ORAL EVERY MORNING
Qty: 30 CAPSULE | Refills: 1 | Status: SHIPPED | OUTPATIENT
Start: 2021-10-13 | End: 2021-11-09

## 2021-10-13 NOTE — PROGRESS NOTES
Cardiology Progress Note     Assessment:  Paroxysmal atrial fibrillation, asymptomatic, first documented episode, resolved  Hypertension, suboptimal control, labile  Alcoholic liver disease with history of portal hypertension and esophageal bleeding, well controlled  Diabetes mellitus  Nocturnal bradycardia, asymptomatic    Plan:  Holter to assess the severity of bradycardia.  I told him that as long as he has only nocturnal bradycardia it should not be of much of the issues.  He has been taking propranolol for prevention of esophageal bleeding.  I advised him to contact his liver specialist to weigh in on importance of staying on propranolol    We will use Dyazide for blood pressure control and to prevent hypokalemia    He was instructed to check blood pressure after he is resting for at least 10 to 15 minutes.    We will obtain sleep evaluation because of nocturnal bradycardia, paroxysmal atrial fibrillation and difficult to control hypertension.    Follow-up with the results of tests available    Subjective:   This is 61 year old male who comes in today for follow-up visit.  He reports the blood pressure continues to be elevated at times.  It does go down when he sits down to rest for about 10 to 15 minutes.  He has seen systolic blood pressure reading anywhere from 160 to 120 mmHg.  He has been alarmed by low heart rate at night.  He denies syncope or near syncope.  He states that heart rate  is in 60s when he is awake    Review of Systems:   Negative other than history of present illness    Objective:   BP (!) 182/72 (BP Location: Left arm, Patient Position: Sitting, Cuff Size: Adult Regular)   Pulse 67   Resp 18   Wt 95.3 kg (210 lb)   SpO2 99%   BMI 28.48 kg/m    Physical Exam:  GENERAL: no distress  NECK: No JVD  LUNGS: Clear to auscultation.  CARDIAC: regular rhythm, S1 & S2 normal.  No heaves, thrills, gallops or murmurs.  ABDOMEN: flat, negative hepatosplenomegaly, soft and  non-tender.  EXTREMITIES: No evidence of cyanosis, clubbing or edema.    Current Outpatient Medications   Medication Sig Dispense Refill     ACE/ARB NOT PRESCRIBED, INTENTIONAL, ACE & ARB not prescribed due to Allergy       albuterol (PROAIR HFA/PROVENTIL HFA/VENTOLIN HFA) 108 (90 Base) MCG/ACT inhaler INHALE 2 PUFFS BY MOUTH EVERY 6 HOURS AS NEEDED FOR SHORTNESS OF BREATH/DYSPNEA OR WHEEZING 54 g 3     ammonium lactate (LAC-HYDRIN) 12 % cream Apply topically 2 times daily as needed for dry skin 385 g 3     apixaban ANTICOAGULANT (ELIQUIS) 5 MG tablet Take 1 tablet (5 mg) by mouth 2 times daily 180 tablet 3     baclofen (LIORESAL) 10 MG tablet TAKE 2 TABLETS BY MOUTH THREE TIMES DAILY 180 tablet 8     blood glucose (ACCU-CHEK KARISHMA PLUS) test strip USE TO TEST BLOOD SUGARS ONCE DAILY 100 strip 1     doxazosin (CARDURA) 1 MG tablet Take 1 tablet (1 mg) by mouth At Bedtime 30 tablet 3     GLOBAL EASE INJECT PEN NEEDLES 32G X 4 MM miscellaneous USE AS DIRECTED EVERY  each 2     insulin glargine (BASAGLAR KWIKPEN) 100 UNIT/ML pen Inject 26 Units Subcutaneous daily 24 mL 0     LORazepam (ATIVAN) 1 MG tablet Take 1 tablet (1 mg) by mouth 2 times daily as needed for anxiety or sleep 60 tablet 0     promethazine (PHENERGAN) 25 MG tablet TAKE 1 TABLET (25MG) BY MOUTH THREE TIMES DAILY AS NEEDED FOR NAUSEA. 90 tablet 0     propranolol (INDERAL) 40 MG tablet Take 2 tablets (80 mg) by mouth 2 times daily 360 tablet 3     rosuvastatin (CRESTOR) 10 MG tablet TAKE 1 TABLET BY MOUTH EVERY DAY 90 tablet 3     rosuvastatin (CRESTOR) 20 MG tablet TAKE 1 TABLET BY MOUTH EVERY DAY 90 tablet 1     sertraline (ZOLOFT) 100 MG tablet TAKE 2 TABLETS BY MOUTH EVERY  tablet 1     triamterene-HCTZ (DYAZIDE) 37.5-25 MG capsule Take 1 capsule by mouth every morning 30 capsule 1       Cardiographics:    ECG: Personally reviewed.  Atrial fibrillation     Echocardiogram: September 2021  Global and regional left ventricular function is  normal with an EF of 55-60%.  Right ventricular function, chamber size, wall motion, and thickness are  normal.  No hemodynamcially signifcant valvular abnormalities.  Estimated mean RA pressure is mildly elevated at 8 mmHg.  No pericardial effusion is present.     Lab Results    Chemistry/lipid CBC Cardiac Enzymes/BNP/TSH/INR   Recent Labs   Lab Test 04/09/21  0813 12/11/15  0854 07/03/15  0910   CHOL 145   < > 220*   HDL 48   < > 33*   LDL 78   < > 166*   TRIG 97   < > 105   CHOLHDLRATIO  --   --  6.7*    < > = values in this interval not displayed.     Recent Labs   Lab Test 04/09/21  0813 07/20/20  0810 04/02/19  0927   LDL 78 172* 147*     Recent Labs   Lab Test 10/04/21  1230      POTASSIUM 3.1*   CHLORIDE 104   CO2 26   GLC 90   BUN 8   CR 0.72   GFRESTIMATED >90   [place] 9.5     Recent Labs   Lab Test 10/04/21  1230 04/09/21  0816 11/23/20  1710   CR 0.72 0.68 0.65*     Recent Labs   Lab Test 10/04/21  1230 04/09/21  0813 07/20/20  0810   A1C 5.6 6.0* 5.8*          Recent Labs   Lab Test 10/04/21  1230   WBC 8.5   HGB 13.6   HCT 37.9*   MCV 85        Recent Labs   Lab Test 10/04/21  1230 04/09/21  0816 11/23/20  1710   HGB 13.6 14.1 14.3    No results for input(s): TROPONINI in the last 18705 hours.  No results for input(s): BNP, NTBNPI, NTBNP in the last 07136 hours.  Recent Labs   Lab Test 10/09/18  0923   TSH 1.36     Recent Labs   Lab Test 10/04/21  1229 04/09/21  0816 07/20/20  0810   INR 1.23* 1.01 1.26*

## 2021-10-13 NOTE — PATIENT INSTRUCTIONS
Erwin Aguilar,    It was a pleasure to see you today at the [facility].     My recommendations after this visit include:    Sleep evaluation  holter monitor  Start dyazide for BP  Sop potassium    W. Markus Fox MD, FACC, [facility]

## 2021-10-13 NOTE — LETTER
10/13/2021    Joel Daniel Wegener, MD  [address] [facility] [address]  [facility] [address]    RE: Erwin Aguilar       Dear Colleague,    I had the pleasure of seeing Erwin Aguilar in the [facility].        Cardiology Progress Note     Assessment:  Paroxysmal atrial fibrillation, asymptomatic, first documented episode, resolved  Hypertension, suboptimal control, labile  Alcoholic liver disease with history of portal hypertension and esophageal bleeding, well controlled  Diabetes mellitus  Nocturnal bradycardia, asymptomatic    Plan:  Holter to assess the severity of bradycardia.  I told him that as long as he has only nocturnal bradycardia it should not be of much of the issues.  He has been taking propranolol for prevention of esophageal bleeding.  I advised him to contact his liver specialist to weigh in on importance of staying on propranolol    We will use Dyazide for blood pressure control and to prevent hypokalemia    He was instructed to check blood pressure after he is resting for at least 10 to 15 minutes.    We will obtain sleep evaluation because of nocturnal bradycardia, paroxysmal atrial fibrillation and difficult to control hypertension.    Follow-up with the results of tests available    Subjective:   This is 61 year old male who comes in today for follow-up visit.  He reports the blood pressure continues to be elevated at times.  It does go down when he sits down to rest for about 10 to 15 minutes.  He has seen systolic blood pressure reading anywhere from 160 to 120 mmHg.  He has been alarmed by low heart rate at night.  He denies syncope or near syncope.  He states that heart rate  is in 60s when he is awake    Review of Systems:   Negative other than history of present illness    Objective:   BP (!) 182/72 (BP Location: Left arm, Patient Position: Sitting, Cuff Size: Adult Regular)   Pulse 67   Resp 18   Wt 95.3 kg (210 lb)   SpO2 99%    BMI 28.48 kg/m    Physical Exam:  GENERAL: no distress  NECK: No JVD  LUNGS: Clear to auscultation.  CARDIAC: regular rhythm, S1 & S2 normal.  No heaves, thrills, gallops or murmurs.  ABDOMEN: flat, negative hepatosplenomegaly, soft and non-tender.  EXTREMITIES: No evidence of cyanosis, clubbing or edema.    Current Outpatient Medications   Medication Sig Dispense Refill     ACE/ARB NOT PRESCRIBED, INTENTIONAL, ACE & ARB not prescribed due to Allergy       albuterol (PROAIR HFA/PROVENTIL HFA/VENTOLIN HFA) 108 (90 Base) MCG/ACT inhaler INHALE 2 PUFFS BY MOUTH EVERY 6 HOURS AS NEEDED FOR SHORTNESS OF BREATH/DYSPNEA OR WHEEZING 54 g 3     ammonium lactate (LAC-HYDRIN) 12 % cream Apply topically 2 times daily as needed for dry skin 385 g 3     apixaban ANTICOAGULANT (ELIQUIS) 5 MG tablet Take 1 tablet (5 mg) by mouth 2 times daily 180 tablet 3     baclofen (LIORESAL) 10 MG tablet TAKE 2 TABLETS BY MOUTH THREE TIMES DAILY 180 tablet 8     blood glucose (ACCU-CHEK KARISHMA PLUS) test strip USE TO TEST BLOOD SUGARS ONCE DAILY 100 strip 1     doxazosin (CARDURA) 1 MG tablet Take 1 tablet (1 mg) by mouth At Bedtime 30 tablet 3     GLOBAL EASE INJECT PEN NEEDLES 32G X 4 MM miscellaneous USE AS DIRECTED EVERY  each 2     insulin glargine (BASAGLAR KWIKPEN) 100 UNIT/ML pen Inject 26 Units Subcutaneous daily 24 mL 0     LORazepam (ATIVAN) 1 MG tablet Take 1 tablet (1 mg) by mouth 2 times daily as needed for anxiety or sleep 60 tablet 0     promethazine (PHENERGAN) 25 MG tablet TAKE 1 TABLET (25MG) BY MOUTH THREE TIMES DAILY AS NEEDED FOR NAUSEA. 90 tablet 0     propranolol (INDERAL) 40 MG tablet Take 2 tablets (80 mg) by mouth 2 times daily 360 tablet 3     rosuvastatin (CRESTOR) 10 MG tablet TAKE 1 TABLET BY MOUTH EVERY DAY 90 tablet 3     rosuvastatin (CRESTOR) 20 MG tablet TAKE 1 TABLET BY MOUTH EVERY DAY 90 tablet 1     sertraline (ZOLOFT) 100 MG tablet TAKE 2 TABLETS BY MOUTH EVERY  tablet 1     triamterene-HCTZ  (DYAZIDE) 37.5-25 MG capsule Take 1 capsule by mouth every morning 30 capsule 1       Cardiographics:    ECG: Personally reviewed.  Atrial fibrillation     Echocardiogram: September 2021  Global and regional left ventricular function is normal with an EF of 55-60%.  Right ventricular function, chamber size, wall motion, and thickness are  normal.  No hemodynamcially signifcant valvular abnormalities.  Estimated mean RA pressure is mildly elevated at 8 mmHg.  No pericardial effusion is present.     Lab Results    Chemistry/lipid CBC Cardiac Enzymes/BNP/TSH/INR   Recent Labs   Lab Test 04/09/21  0813 12/11/15  0854 07/03/15  0910   CHOL 145   < > 220*   HDL 48   < > 33*   LDL 78   < > 166*   TRIG 97   < > 105   CHOLHDLRATIO  --   --  6.7*    < > = values in this interval not displayed.     Recent Labs   Lab Test 04/09/21  0813 07/20/20  0810 04/02/19  0927   LDL 78 172* 147*     Recent Labs   Lab Test 10/04/21  1230      POTASSIUM 3.1*   CHLORIDE 104   CO2 26   GLC 90   BUN 8   CR 0.72   GFRESTIMATED >90   [place] 9.5     Recent Labs   Lab Test 10/04/21  1230 04/09/21  0816 11/23/20  1710   CR 0.72 0.68 0.65*     Recent Labs   Lab Test 10/04/21  1230 04/09/21  0813 07/20/20  0810   A1C 5.6 6.0* 5.8*          Recent Labs   Lab Test 10/04/21  1230   WBC 8.5   HGB 13.6   HCT 37.9*   MCV 85        Recent Labs   Lab Test 10/04/21  1230 04/09/21  0816 11/23/20  1710   HGB 13.6 14.1 14.3    No results for input(s): TROPONINI in the last 01828 hours.  No results for input(s): BNP, NTBNPI, NTBNP in the last 99325 hours.  Recent Labs   Lab Test 10/09/18  0923   TSH 1.36     Recent Labs   Lab Test 10/04/21  1229 04/09/21  0816 07/20/20  0810   INR 1.23* 1.01 1.26*                        Thank you for allowing me to participate in the care of your patient.      Sincerely,     Markus Dominique, MD     [facility]  cc:   Markus Fox MD  [address]  [place] [address]

## 2021-10-15 ENCOUNTER — MYC MEDICAL ADVICE (OUTPATIENT)
Dept: FAMILY MEDICINE | Facility: CLINIC | Age: 61
End: 2021-10-15

## 2021-10-15 NOTE — TELEPHONE ENCOUNTER
JW,    Please see [employer] message below.  Med list already updated.  Future BMP order already in his chart.    Thanks,  Isela Villar RN

## 2021-10-18 ENCOUNTER — LAB (OUTPATIENT)
Dept: LAB | Facility: CLINIC | Age: 61
End: 2021-10-18
Payer: MEDICARE

## 2021-10-18 DIAGNOSIS — E87.6 HYPOKALEMIA: ICD-10-CM

## 2021-10-18 PROCEDURE — 80048 BASIC METABOLIC PNL TOTAL CA: CPT

## 2021-10-18 PROCEDURE — 36415 COLL VENOUS BLD VENIPUNCTURE: CPT

## 2021-10-19 ENCOUNTER — MYC MEDICAL ADVICE (OUTPATIENT)
Dept: CARDIOLOGY | Facility: CLINIC | Age: 61
End: 2021-10-19

## 2021-10-19 ENCOUNTER — HOSPITAL ENCOUNTER (OUTPATIENT)
Dept: CARDIOLOGY | Facility: HOSPITAL | Age: 61
Discharge: HOME | End: 2021-10-19
Attending: INTERNAL MEDICINE | Admitting: INTERNAL MEDICINE
Payer: MEDICARE

## 2021-10-19 ENCOUNTER — APPOINTMENT (OUTPATIENT)
Dept: GENERAL RADIOLOGY | Facility: CLINIC | Age: 61
End: 2021-10-19
Attending: EMERGENCY MEDICINE
Payer: MEDICARE

## 2021-10-19 ENCOUNTER — HOSPITAL ENCOUNTER (EMERGENCY)
Facility: CLINIC | Age: 61
Discharge: HOME | End: 2021-10-20
Attending: EMERGENCY MEDICINE | Admitting: EMERGENCY MEDICINE
Payer: MEDICARE

## 2021-10-19 ENCOUNTER — MYC MEDICAL ADVICE (OUTPATIENT)
Dept: FAMILY MEDICINE | Facility: CLINIC | Age: 61
End: 2021-10-19

## 2021-10-19 DIAGNOSIS — I10 HYPERTENSION, UNSPECIFIED TYPE: ICD-10-CM

## 2021-10-19 DIAGNOSIS — R51.9 NONINTRACTABLE HEADACHE, UNSPECIFIED CHRONICITY PATTERN, UNSPECIFIED HEADACHE TYPE: ICD-10-CM

## 2021-10-19 DIAGNOSIS — F41.9 ANXIETY: ICD-10-CM

## 2021-10-19 DIAGNOSIS — E87.6 HYPOKALEMIA: ICD-10-CM

## 2021-10-19 DIAGNOSIS — I48.0 PAROXYSMAL ATRIAL FIBRILLATION (H): ICD-10-CM

## 2021-10-19 DIAGNOSIS — I10 HYPERTENSION GOAL BP (BLOOD PRESSURE) < 140/90: Primary | ICD-10-CM

## 2021-10-19 LAB
ALBUMIN SERPL-MCNC: 4.4 G/DL (ref 3.4–5)
ALP SERPL-CCNC: 155 U/L (ref 40–150)
ALT SERPL W P-5'-P-CCNC: 42 U/L (ref 0–70)
ANION GAP SERPL CALCULATED.3IONS-SCNC: 11 MMOL/L (ref 3–14)
ANION GAP SERPL CALCULATED.3IONS-SCNC: 5 MMOL/L (ref 3–14)
AST SERPL W P-5'-P-CCNC: 28 U/L (ref 0–45)
BASOPHILS # BLD AUTO: 0.1 10E3/UL (ref 0–0.2)
BASOPHILS NFR BLD AUTO: 1 %
BILIRUB SERPL-MCNC: 1 MG/DL (ref 0.2–1.3)
BUN SERPL-MCNC: 18 MG/DL (ref 7–30)
BUN SERPL-MCNC: 20 MG/DL (ref 7–30)
CALCIUM SERPL-MCNC: 9.7 MG/DL (ref 8.5–10.1)
CALCIUM SERPL-MCNC: 9.9 MG/DL (ref 8.5–10.1)
CHLORIDE BLD-SCNC: 102 MMOL/L (ref 94–109)
CHLORIDE BLD-SCNC: 106 MMOL/L (ref 94–109)
CO2 SERPL-SCNC: 20 MMOL/L (ref 20–32)
CO2 SERPL-SCNC: 26 MMOL/L (ref 20–32)
CREAT SERPL-MCNC: 0.66 MG/DL (ref 0.66–1.25)
CREAT SERPL-MCNC: 0.76 MG/DL (ref 0.66–1.25)
EOSINOPHIL # BLD AUTO: 0.2 10E3/UL (ref 0–0.7)
EOSINOPHIL NFR BLD AUTO: 1 %
ERYTHROCYTE [DISTWIDTH] IN BLOOD BY AUTOMATED COUNT: 12.8 % (ref 10–15)
GFR SERPL CREATININE-BSD FRML MDRD: >90 ML/MIN/1.73M2
GFR SERPL CREATININE-BSD FRML MDRD: >90 ML/MIN/1.73M2
GLUCOSE BLD-MCNC: 142 MG/DL (ref 70–99)
GLUCOSE BLD-MCNC: 151 MG/DL (ref 70–99)
HCT VFR BLD AUTO: 45.9 % (ref 40–53)
HGB BLD-MCNC: 16.6 G/DL (ref 13.3–17.7)
HOLD SPECIMEN: NORMAL
IMM GRANULOCYTES # BLD: 0 10E3/UL
IMM GRANULOCYTES NFR BLD: 0 %
LYMPHOCYTES # BLD AUTO: 3.2 10E3/UL (ref 0.8–5.3)
LYMPHOCYTES NFR BLD AUTO: 26 %
MCH RBC QN AUTO: 31.4 PG (ref 26.5–33)
MCHC RBC AUTO-ENTMCNC: 36.2 G/DL (ref 31.5–36.5)
MCV RBC AUTO: 87 FL (ref 78–100)
MONOCYTES # BLD AUTO: 1.3 10E3/UL (ref 0–1.3)
MONOCYTES NFR BLD AUTO: 10 %
NEUTROPHILS # BLD AUTO: 7.7 10E3/UL (ref 1.6–8.3)
NEUTROPHILS NFR BLD AUTO: 62 %
NRBC # BLD AUTO: 0 10E3/UL
NRBC BLD AUTO-RTO: 0 /100
PLATELET # BLD AUTO: 315 10E3/UL (ref 150–450)
POTASSIUM BLD-SCNC: 3.2 MMOL/L (ref 3.4–5.3)
POTASSIUM BLD-SCNC: 3.4 MMOL/L (ref 3.4–5.3)
PROT SERPL-MCNC: 9.1 G/DL (ref 6.8–8.8)
RBC # BLD AUTO: 5.29 10E6/UL (ref 4.4–5.9)
SODIUM SERPL-SCNC: 133 MMOL/L (ref 133–144)
SODIUM SERPL-SCNC: 137 MMOL/L (ref 133–144)
TROPONIN I SERPL-MCNC: <0.015 UG/L (ref 0–0.04)
WBC # BLD AUTO: 12.5 10E3/UL (ref 4–11)

## 2021-10-19 PROCEDURE — 71045 X-RAY EXAM CHEST 1 VIEW: CPT | Mod: 26 | Performed by: RADIOLOGY

## 2021-10-19 PROCEDURE — 96374 THER/PROPH/DIAG INJ IV PUSH: CPT | Performed by: EMERGENCY MEDICINE

## 2021-10-19 PROCEDURE — 99285 EMERGENCY DEPT VISIT HI MDM: CPT | Mod: 25 | Performed by: EMERGENCY MEDICINE

## 2021-10-19 PROCEDURE — 84484 ASSAY OF TROPONIN QUANT: CPT | Performed by: EMERGENCY MEDICINE

## 2021-10-19 PROCEDURE — 93005 ELECTROCARDIOGRAM TRACING: CPT | Performed by: EMERGENCY MEDICINE

## 2021-10-19 PROCEDURE — 36415 COLL VENOUS BLD VENIPUNCTURE: CPT | Performed by: EMERGENCY MEDICINE

## 2021-10-19 PROCEDURE — 93225 XTRNL ECG REC<48 HRS REC: CPT

## 2021-10-19 PROCEDURE — 71045 X-RAY EXAM CHEST 1 VIEW: CPT

## 2021-10-19 PROCEDURE — 93010 ELECTROCARDIOGRAM REPORT: CPT | Performed by: EMERGENCY MEDICINE

## 2021-10-19 PROCEDURE — 85025 COMPLETE CBC W/AUTO DIFF WBC: CPT | Performed by: EMERGENCY MEDICINE

## 2021-10-19 PROCEDURE — 96361 HYDRATE IV INFUSION ADD-ON: CPT | Performed by: EMERGENCY MEDICINE

## 2021-10-19 PROCEDURE — 258N000003 HC RX IP 258 OP 636: Performed by: EMERGENCY MEDICINE

## 2021-10-19 PROCEDURE — 250N000011 HC RX IP 250 OP 636: Performed by: EMERGENCY MEDICINE

## 2021-10-19 PROCEDURE — 80053 COMPREHEN METABOLIC PANEL: CPT | Performed by: EMERGENCY MEDICINE

## 2021-10-19 RX ORDER — SODIUM CHLORIDE 9 MG/ML
INJECTION, SOLUTION INTRAVENOUS CONTINUOUS
Status: DISCONTINUED | OUTPATIENT
Start: 2021-10-19 | End: 2021-10-20 | Stop reason: HOSPADM

## 2021-10-19 RX ORDER — ONDANSETRON 2 MG/ML
4 INJECTION INTRAMUSCULAR; INTRAVENOUS ONCE
Status: COMPLETED | OUTPATIENT
Start: 2021-10-19 | End: 2021-10-19

## 2021-10-19 RX ADMIN — SODIUM CHLORIDE 1000 ML: 9 INJECTION, SOLUTION INTRAVENOUS at 22:49

## 2021-10-19 RX ADMIN — ONDANSETRON 4 MG: 2 INJECTION INTRAMUSCULAR; INTRAVENOUS at 22:48

## 2021-10-19 ASSESSMENT — MIFFLIN-ST. JEOR: SCORE: 1724.79

## 2021-10-19 NOTE — TELEPHONE ENCOUNTER
FW: Updates about my health  Received: Today  Markus Fox MD Caswell, Mallory J, RN  Phone Number: [phone]     He should get basic metabolic panel in 1 week             Order placed. msg sent to pt via [employer]. -[facility]

## 2021-10-20 ENCOUNTER — TELEPHONE (OUTPATIENT)
Dept: CARDIOLOGY | Facility: CLINIC | Age: 61
End: 2021-10-20

## 2021-10-20 ENCOUNTER — MYC MEDICAL ADVICE (OUTPATIENT)
Dept: FAMILY MEDICINE | Facility: CLINIC | Age: 61
End: 2021-10-20

## 2021-10-20 VITALS
WEIGHT: 199 LBS | SYSTOLIC BLOOD PRESSURE: 156 MMHG | TEMPERATURE: 98.2 F | BODY MASS INDEX: 27.86 KG/M2 | DIASTOLIC BLOOD PRESSURE: 87 MMHG | OXYGEN SATURATION: 99 % | HEART RATE: 51 BPM | HEIGHT: 71 IN | RESPIRATION RATE: 18 BRPM

## 2021-10-20 DIAGNOSIS — I48.0 PAROXYSMAL ATRIAL FIBRILLATION (H): Primary | ICD-10-CM

## 2021-10-20 LAB
ATRIAL RATE - MUSE: 54 BPM
DIASTOLIC BLOOD PRESSURE - MUSE: NORMAL MMHG
INTERPRETATION ECG - MUSE: NORMAL
P AXIS - MUSE: 6 DEGREES
PR INTERVAL - MUSE: 170 MS
QRS DURATION - MUSE: 84 MS
QT - MUSE: 474 MS
QTC - MUSE: 449 MS
R AXIS - MUSE: 6 DEGREES
SYSTOLIC BLOOD PRESSURE - MUSE: NORMAL MMHG
T AXIS - MUSE: 16 DEGREES
VENTRICULAR RATE- MUSE: 54 BPM

## 2021-10-20 PROCEDURE — 96375 TX/PRO/DX INJ NEW DRUG ADDON: CPT | Performed by: EMERGENCY MEDICINE

## 2021-10-20 PROCEDURE — 250N000011 HC RX IP 250 OP 636: Performed by: EMERGENCY MEDICINE

## 2021-10-20 RX ORDER — KETOROLAC TROMETHAMINE 30 MG/ML
30 INJECTION, SOLUTION INTRAMUSCULAR; INTRAVENOUS ONCE
Status: COMPLETED | OUTPATIENT
Start: 2021-10-20 | End: 2021-10-20

## 2021-10-20 RX ADMIN — KETOROLAC TROMETHAMINE 30 MG: 30 INJECTION, SOLUTION INTRAMUSCULAR; INTRAVENOUS at 01:00

## 2021-10-20 NOTE — ED TRIAGE NOTES
Pt presents to ED via EMS c/o headache related to HTN. Pt states his PCP adjusted his blood pressure medication, but claims his blood pressure is still not under control. Pt also states he's concerned he is withdrawing from ativan. Pt states he previously took ativan for 2 weeks and stopped taking the medication Saturday.

## 2021-10-20 NOTE — TELEPHONE ENCOUNTER
===View-only below this line===  ----- Message -----  From: Markus Fox MD  Sent: 10/20/2021  10:23 AM CDT  To: Colleen King RN    He should have 24 hour holter      Noted message. Holter monitor order placed again as pt's was removed. Attempted to call patient and go over this information but his phone keeps ringing twice and going to voicemail. Left message that schedulers will call him to arrange. -[facility]

## 2021-10-20 NOTE — TELEPHONE ENCOUNTER
----- Message from Vicki Calhoun sent at 10/20/2021  8:12 AM CDT -----  Regarding: TZ pt  General phone call:    Caller: Erwin  Primary cardiologist: LORRAINE  Detailed reason for call: 10:00 am yesterday he had a holter put on and taken off at 800 pm when he went to [facility] ER because of high BP - does he have to have another monitor as they took it off or did you get enough  Best phone number: [phone]  Best time to contact: any  Ok to leave a detailedmessage? yes  Device? no    Additional Info:          Called patient and updated him that [facility] is out of the office this morning but will route for review to see if additional monitor is needed. He wore it for approx. 10 hours. Unsure as to why it was removed and not replaced but writer is agreeable to new appt if need be. -[facility]        Dr. Fox,  Holter removed after only wearing 10 hrs in  ER yesterday. Pt wonders if this is sufficient or if we should place another order for him to redo.   Thanks,  Omi

## 2021-10-20 NOTE — ED PROVIDER NOTES
[place] EMERGENCY DEPARTMENT ([facility])  10/19/21    History     Chief Complaint   Patient presents with     Headache     Hypertension     The history is provided by the patient and medical records.     Erwin Aguilar is a 61 year old male with a history of atrial fibrillation (on Eliquis), cauda equina syndrome with neurogenic bladder, cirrhosis c/b esophageal varices, HTN, and HLD who presents to the Emergency Department with headache and hypertension. Patient reports he was on ativan for about 2-3 weeks for anxiety and difficulty with sleep but states it made him feel weird so he tapered on his own over about 4 days and it back to 1 pill a day and half a pill a day off over several days. He states he stopped taking it on Saturday, 10/16. Patient reports he now feels like he is in withdrawal reporting symptoms of headache, nausea, and sweating since Freddy, 10/17. Patient reports he has also noted a cough and has been testing his BP. Patient reports BP has been really high. He states about 6 weeks ago he had an episode of atrial fibrillation. He reports 2 weeks after that he was found to have hypertension up to 180-200 systolic. Patient reports he had follow-up for this and was started on triamterene hydrochlorothiazide last Wednesday, 10/13. He states with addition of this medication his BP was running around 140-165 systolic. Patient reports today his propanolol was increased from 2 tabs BID to 2 tabs TID due to higher readings. Patient reports he has also lost 13 pounds since Wednesday, 10/13.  Patient denies chest pain or shortness of breath. No falls or head injury. Patient denies tobacco, alcohol, or other drug use. He does note use of medical cannabis.    Past Medical History  Past Medical History:   Diagnosis Date     Actinic keratosis     aldara     Anxiety      Cancer (H)     squamous cell skin CA     Cauda equina spinal cord injury (H)      Chronic sinusitis 5-1-16     Depressive  disorder      Diastasis recti      Esophageal reflux      Esophageal varices in cirrhosis (H) 4/1/2014    Hospitalized for UGI blee 3/28/14, endoscopy revealed bleeding varices.     Essential hypertension, benign      Intermittent asthma      Mild depression (H)      Mixed hyperlipidemia      Nasal polyps 5-1-16     Osteoarthritis of lumbar spine, unspecified spinal osteoarthritis complication status      Other chronic pain      Paroxysmal atrial fibrillation (H) 9/22/2021     SCCA (squamous cell carcinoma) of skin      Seasonal allergic conjunctivitis      Type II or unspecified type diabetes mellitus without mention of complication, not stated as uncontrolled      Unspecified site of spinal cord injury without evidence of spinal bone injury     due to back surgery     Past Surgical History:   Procedure Laterality Date     ABDOMEN SURGERY  2014     BACK SURGERY  August 2009     C APPENDECTOMY  1974     COLONOSCOPY N/A 5/12/2016    Procedure: COMBINED COLONOSCOPY, SINGLE OR MULTIPLE BIOPSY/POLYPECTOMY BY BIOPSY;  Surgeon: Ana Paula Urbina MD;  Location:  GI     ENDOSCOPY UPPER, COLONOSCOPY, COMBINED  10/19/2011    Procedure:COMBINED ENDOSCOPY UPPER, COLONOSCOPY; Upper Endoscopy, Colonoscopy with Polypectomy x4; Surgeon:AMBAR RODRÍGUEZ; Location: OR     ENT SURGERY  1-2016    Ongoing since then     ESOPHAGOSCOPY, GASTROSCOPY, DUODENOSCOPY (EGD), COMBINED  3/28/2014    Procedure: COMBINED ESOPHAGOSCOPY, GASTROSCOPY, DUODENOSCOPY (EGD);  EGD, Gastric Biopsies, Esophageal Banding;  Surgeon: Reyna Tovar MD;  Location:  OR     ESOPHAGOSCOPY, GASTROSCOPY, DUODENOSCOPY (EGD), COMBINED  6/9/2014    Procedure: COMBINED ESOPHAGOSCOPY, GASTROSCOPY, DUODENOSCOPY (EGD);  Surgeon: Curtis Mendez MD;  Location:  GI     ESOPHAGOSCOPY, GASTROSCOPY, DUODENOSCOPY (EGD), COMBINED  7/24/2014    Procedure: COMBINED ESOPHAGOSCOPY, GASTROSCOPY, DUODENOSCOPY (EGD);  Surgeon: Gerard Samaniego MD;   Location: [facility] OR     ESOPHAGOSCOPY, GASTROSCOPY, DUODENOSCOPY (EGD), COMBINED N/A 10/31/2014    Procedure: COMBINED ESOPHAGOSCOPY, GASTROSCOPY, DUODENOSCOPY (EGD);  Surgeon: Gerard Samaniego MD;  Location: [facility] OR     ESOPHAGOSCOPY, GASTROSCOPY, DUODENOSCOPY (EGD), COMBINED N/A 5/12/2016    Procedure: COMBINED ESOPHAGOSCOPY, GASTROSCOPY, DUODENOSCOPY (EGD);  Surgeon: Ana Paula Urbina MD;  Location: [facility] GI     ESOPHAGOSCOPY, GASTROSCOPY, DUODENOSCOPY (EGD), COMBINED N/A 8/2/2018    Procedure: COMBINED ESOPHAGOSCOPY, GASTROSCOPY, DUODENOSCOPY (EGD);  EGD;  Surgeon: Yu Wagner MD;  Location: [facility] GI     HCL SQUAMOUS CELL CARCINOMA AG  10/07    left forearm     HERNIORRHAPHY UMBILICAL  11/8/2012    Procedure: HERNIORRHAPHY UMBILICAL;  Open Umbilical Hernia Repair With Mesh ;  Surgeon: Chase Nicholson MD;  Location: [facility] OR     INSERT STIMULATOR DORSAL COLUMN N/A 6/28/2018    Procedure: INSERT STIMULATOR DORSAL COLUMN;;  Surgeon: Elvis Sauceda MD;  Location: [facility] OR     neuro stimulator  2010     REMOVE GENERATOR STIMULATOR (LOCATION) N/A 6/28/2018    Procedure: REMOVE GENERATOR STIMULATOR (LOCATION);  Spinal Cord Stimulator and IPG Explant and Re-Implant of SCS System (Leads and IPG);  Surgeon: Elvis Sauceda MD;  Location: [facility] OR     REPAIR TENDON ACHILLES Right 11/11/2020    Procedure: Right achilles debridement and partial calcaneus excision;  Surgeon: Eh Sandoval MD;  Location: [facility] OR     SURGICAL HISTORY OF -   1/02    abcess tooth     SURGICAL HISTORY OF -   1999    L4-5 laminectomy, cauda equina syndrome     SURGICAL HISTORY OF -   +    herniated disk repair     TONSILLECTOMY  10 1964     TRANSPOSITION ULNAR NERVE (ELBOW)      right     ACE/ARB NOT PRESCRIBED, INTENTIONAL,  albuterol (PROAIR HFA/PROVENTIL HFA/VENTOLIN HFA) 108 (90 Base) MCG/ACT inhaler  ammonium lactate (LAC-HYDRIN) 12 % cream  apixaban ANTICOAGULANT (ELIQUIS) 5  MG tablet  baclofen (LIORESAL) 10 MG tablet  blood glucose (ACCU-CHEK KARISHMA PLUS) test strip  doxazosin (CARDURA) 1 MG tablet  GLOBAL EASE INJECT PEN NEEDLES 32G X 4 MM miscellaneous  insulin glargine (BASAGLAR KWIKPEN) 100 UNIT/ML pen  LORazepam (ATIVAN) 1 MG tablet  promethazine (PHENERGAN) 25 MG tablet  propranolol (INDERAL) 40 MG tablet  rosuvastatin (CRESTOR) 10 MG tablet  rosuvastatin (CRESTOR) 20 MG tablet  sertraline (ZOLOFT) 100 MG tablet  triamterene-HCTZ (DYAZIDE) 37.5-25 MG capsule      Allergies   Allergen Reactions     Levaquin Anaphylaxis and Rash     Swelling in lip and tongue felt thick     Lisinopril Anaphylaxis     Swollen tongue; inability to swallow; drooling; hives; swollen face, neck, angioedema     Acetaminophen      Hx of cirrhosis      Amlodipine      Swelling, hives, possible angioedema       Morphine      b/p dropped and arms went numb  Hypotension     Quinolones Hives     Spironolactone Unknown     Pt believes himself to be allergic to it; cannot find his old records of this.-mjc      Bactrim [Sulfamethoxazole W/Trimethoprim] Rash     Family History  Family History   Problem Relation Age of Onset     Cancer Mother         lung     Breast Cancer Mother      Other Cancer Mother      Cancer Father         esophogeal, GERD     Diabetes Brother         amputation, Type 1     Diabetes Brother      Diabetes Brother      Cancer - colorectal No family hx of      Social History   Social History     Tobacco Use     Smoking status: Former Smoker     Packs/day: 0.00     Years: 1.00     Pack years: 0.00     Types: Cigarettes     Start date: 10/13/1983     Quit date: 1984     Years since quittin.3     Smokeless tobacco: Never Used   Substance Use Topics     Alcohol use: No     Alcohol/week: 0.0 standard drinks     Comment: a pint of alcohol / day - last drink was 3/28/14     Drug use: Yes     Frequency: 2.0 times per week     Types: Marijuana     Comment: marijuana - occasional      Past  "medical history, past surgical history, medications, allergies, family history, and social history were reviewed with the patient. No additional pertinent items.       Review of Systems  A complete review of systems was performed with pertinent positives and negatives noted in the HPI, and all other systems negative.    Physical Exam   BP: (!) 190/93  Pulse: 60  Temp: 98.2  F (36.8  C)  Resp: 18  Height: 180.3 cm (5' 11\")  Weight: 90.3 kg (199 lb)  SpO2: 97 %  Physical Exam  Constitutional:       General: He is not in acute distress.     Appearance: Normal appearance.   HENT:      Head: Normocephalic and atraumatic.      Right Ear: Tympanic membrane normal.      Left Ear: Tympanic membrane normal.      Nose: Nose normal.      Mouth/Throat:      Mouth: Mucous membranes are moist.   Eyes:      Extraocular Movements: Extraocular movements intact.      Pupils: Pupils are equal, round, and reactive to light.   Cardiovascular:      Rate and Rhythm: Normal rate and regular rhythm.      Pulses: Normal pulses.   Pulmonary:      Effort: Pulmonary effort is normal.      Breath sounds: Normal breath sounds.   Abdominal:      General: Abdomen is flat.      Palpations: Abdomen is soft.      Tenderness: There is no abdominal tenderness. There is no guarding or rebound.   Musculoskeletal:         General: No tenderness. Normal range of motion.      Cervical back: Normal range of motion and neck supple.   Skin:     General: Skin is warm.   Neurological:      Mental Status: He is alert and oriented to person, place, and time. Mental status is at baseline.      Cranial Nerves: No cranial nerve deficit.      Motor: No weakness.      Comments: Baseline lower extremity sensory deficits from previous spinal injury    Motor function intact to lower extremities         ED Course   10:26 PM  The patient was seen and examined by Myron Park MD in Room ED20.      Procedures            EKG Interpretation:      Interpreted by Myron Prak, " MD  Time reviewed: 2241  Symptoms at time of EKG: None   Rhythm: sinus bradycardia  Rate: 54  Axis: Normal  Ectopy: none  Conduction: normal  ST Segments/ T Waves: No acute ischemic changes  Q Waves: none  Comparison to prior: Unchanged    Clinical Impression: no acute changes     Results for orders placed or performed during the hospital encounter of 10/19/21   XR Chest Port 1 View     Status: None    Narrative    EXAM: XR CHEST PORT 1 VIEW  LOCATION: [facility]  DATE/TIME: 10/19/2021 10:58 PM    INDICATION: chest pain  COMPARISON: 6/30/2018.      Impression    IMPRESSION: Negative chest.   Extra Blue Top Tube     Status: None   Result Value Ref Range    Hold Specimen JIC    Extra Red Top Tube     Status: None   Result Value Ref Range    Hold Specimen JIC    Extra Green Top (Lithium Heparin) Tube     Status: None   Result Value Ref Range    Hold Specimen JIC    Extra Purple Top Tube     Status: None   Result Value Ref Range    Hold Specimen JIC    Troponin I (second draw)     Status: Normal   Result Value Ref Range    Troponin I <0.015 0.000 - 0.045 ug/L   Comprehensive metabolic panel     Status: Abnormal   Result Value Ref Range    Sodium 137 133 - 144 mmol/L    Potassium 3.4 3.4 - 5.3 mmol/L    Chloride 106 94 - 109 mmol/L    Carbon Dioxide (CO2) 20 20 - 32 mmol/L    Anion Gap 11 3 - 14 mmol/L    Urea Nitrogen 20 7 - 30 mg/dL    Creatinine 0.66 0.66 - 1.25 mg/dL    Calcium 9.9 8.5 - 10.1 mg/dL    Glucose 142 (H) 70 - 99 mg/dL    Alkaline Phosphatase 155 (H) 40 - 150 U/L    AST 28 0 - 45 U/L    ALT 42 0 - 70 U/L    Protein Total 9.1 (H) 6.8 - 8.8 g/dL    Albumin 4.4 3.4 - 5.0 g/dL    Bilirubin Total 1.0 0.2 - 1.3 mg/dL    GFR Estimate >90 >60 mL/min/1.73m2   CBC with platelets and differential     Status: Abnormal   Result Value Ref Range    WBC Count 12.5 (H) 4.0 - 11.0 10e3/uL    RBC Count 5.29 4.40 - 5.90 10e6/uL    Hemoglobin 16.6 13.3 - 17.7 g/dL    Hematocrit  45.9 40.0 - 53.0 %    MCV 87 78 - 100 fL    MCH 31.4 26.5 - 33.0 pg    MCHC 36.2 31.5 - 36.5 g/dL    RDW 12.8 10.0 - 15.0 %    Platelet Count 315 150 - 450 10e3/uL    % Neutrophils 62 %    % Lymphocytes 26 %    % Monocytes 10 %    % Eosinophils 1 %    % Basophils 1 %    % Immature Granulocytes 0 %    NRBCs per 100 WBC 0 <1 /100    Absolute Neutrophils 7.7 1.6 - 8.3 10e3/uL    Absolute Lymphocytes 3.2 0.8 - 5.3 10e3/uL    Absolute Monocytes 1.3 0.0 - 1.3 10e3/uL    Absolute Eosinophils 0.2 0.0 - 0.7 10e3/uL    Absolute Basophils 0.1 0.0 - 0.2 10e3/uL    Absolute Immature Granulocytes 0.0 <=0.0 10e3/uL    Absolute NRBCs 0.0 10e3/uL   Extra Blue Top Tube     Status: None   Result Value Ref Range    Hold Specimen JIC    Extra Red Top Tube     Status: None   Result Value Ref Range    Hold Specimen JIC    Extra Green Top (Lithium Heparin) Tube     Status: None   Result Value Ref Range    Hold Specimen JIC    Extra Purple Top Tube     Status: None   Result Value Ref Range    Hold Specimen JIC    EKG 12 lead     Status: None (Preliminary result)   Result Value Ref Range    Systolic Blood Pressure  mmHg    Diastolic Blood Pressure  mmHg    Ventricular Rate 54 BPM    Atrial Rate 54 BPM    NC Interval 170 ms    QRS Duration 84 ms     ms    QTc 449 ms    P Axis 6 degrees    R AXIS 6 degrees    T Axis 16 degrees    Interpretation ECG Sinus bradycardia  Otherwise normal ECG      [place] Draw     Status: None    Narrative    The following orders were created for panel order [place] Draw.  Procedure                               Abnormality         Status                     ---------                               -----------         ------                     Extra Blue Top Tube[[id number]]                              Final result               Extra Red Top Tube[[id number]]                               Final result               Extra Green Top (Lithium...[[id number]]                      Final result               Extra  Purple Top Tube[[id number]]                            Final result                 Please view results for these tests on the individual orders.   CBC with platelets differential     Status: Abnormal    Narrative    The following orders were created for panel order CBC with platelets differential.  Procedure                               Abnormality         Status                     ---------                               -----------         ------                     CBC with platelets and d...[[id number]]  Abnormal            Final result                 Please view results for these tests on the individual orders.   Extra Tube ([place] Draw)     Status: None    Narrative    The following orders were created for panel order Extra Tube ([place] Draw).  Procedure                               Abnormality         Status                     ---------                               -----------         ------                     Extra Blood Culture Bottle[[id number]]                                                  Extra Blue Top Tube[[id number]]                              Final result               Extra Red Top Tube[[id number]]                               Final result               Extra Green Top (Lithium...[[id number]]                      Final result               Extra Purple Top Tube[[id number]]                            Final result               Extra Urine Collection[[id number]]                                                      Extra Body Fluid Collection[[id number]]                                                   Please view results for these tests on the individual orders.     Medications   0.9% sodium chloride BOLUS (0 mLs Intravenous Stopped 10/19/21 4641)     Followed by   sodium chloride 0.9% infusion (has no administration in time range)   ondansetron (ZOFRAN) injection 4 mg (4 mg Intravenous Given 10/19/21 9957)   ketorolac (TORADOL) injection 30 mg (30 mg Intravenous Given 10/20/21 0100)         Assessments & Plan (with Medical Decision Making)   Patient presents for high blood pressure readings and headache.  He does appear slightly anxious however he is not tachycardic blood pressure was initially elevated but did come down without intervention.  His exam is otherwise benign with no head injury or new neurologic deficit he is at his baseline from previous spinal cord injury and sensory deficits.  He still is able to move his lower extremities and has no drift no cranial nerve deficit.  EKG is sinus bradycardia similar to his previous.  Laboratory work including LFTs along with troponin and remainder of labs are unremarkable.  Chest x-ray does not show an acute process.  Discussed with the patient did not feel given the dosing and short duration of his benzos that this is necessarily related to withdrawal.  I do think his anxiety is playing a factor as he also reports he is not sleeping well but this is been going on for months to years.  With no trauma or new neurologic deficits I do not feel he needs any imaging.  He was treated with Toradol and mild improvement of his pain due to his allergies he cannot have Tylenol.  He also does not want to be on any addictive substances, so he was tapering off of the benzos.  He started to feel slightly better.  He has benign exam and is at his baseline and his hypertension has started to improve without treatment.  Discussed with the patient that I would not change his blood pressure medication or need to intervene at this time.  This can be managed as an outpatient.  Advised to follow-up with his cardiologist for continued blood pressure management, along with also following up with his primary care physician for this as well and to discuss any further medication treatment for anxiety and sleep.  Patient reports he is going to see pain psychology soon for further evaluation given his chronic pain symptoms.  Discussed signs and symptoms of blood pressure and  neurologic deficits or other symptoms to return to the emergency department.  Patient understands the plan will follow up with his outpatient specialist.  Patient discharged in stable condition    I have reviewed the nursing notes. I have reviewed the findings, diagnosis, plan and need for follow up with the patient.    New Prescriptions    No medications on file       Final diagnoses:   Hypertension, unspecified type   Nonintractable headache, unspecified chronicity pattern, unspecified headache type   Anxiety     I, Pao Baxter, am serving as a trained medical scribe to document services personally performed by Myron Park MD, based on the provider's statements to me.      I, Myron Park MD, was physically present and have reviewed and verified the accuracy of this note documented by Pao Baxter.     --  Myron Park MD  [facility] EMERGENCY DEPARTMENT  10/19/2021     Myron Park MD  10/20/21 0209

## 2021-10-20 NOTE — TELEPHONE ENCOUNTER
JW,    Please see [employer] message.  Looks like patient also called Cardiology about this today..    Thanks,  Isela Villar RN

## 2021-10-22 ENCOUNTER — MYC MEDICAL ADVICE (OUTPATIENT)
Dept: FAMILY MEDICINE | Facility: CLINIC | Age: 61
End: 2021-10-22

## 2021-10-22 NOTE — TELEPHONE ENCOUNTER
JW,  Please see below Madrone message and advise.  Ok to do physical virtually?  Thanks,  Randi BARRIOS RN

## 2021-10-23 ENCOUNTER — HEALTH MAINTENANCE LETTER (OUTPATIENT)
Age: 61
End: 2021-10-23

## 2021-10-25 ENCOUNTER — HOSPITAL ENCOUNTER (OUTPATIENT)
Dept: CARDIOLOGY | Facility: HOSPITAL | Age: 61
Discharge: HOME | End: 2021-10-25
Attending: INTERNAL MEDICINE | Admitting: INTERNAL MEDICINE
Payer: MEDICARE

## 2021-10-25 DIAGNOSIS — I48.0 PAROXYSMAL ATRIAL FIBRILLATION (H): ICD-10-CM

## 2021-10-25 PROCEDURE — 93225 XTRNL ECG REC<48 HRS REC: CPT

## 2021-10-25 PROCEDURE — 93227 XTRNL ECG REC<48 HR R&I: CPT | Performed by: INTERNAL MEDICINE

## 2021-10-26 ENCOUNTER — VIRTUAL VISIT (OUTPATIENT)
Dept: FAMILY MEDICINE | Facility: CLINIC | Age: 61
End: 2021-10-26
Payer: MEDICARE

## 2021-10-26 DIAGNOSIS — Z79.4 TYPE 2 DIABETES MELLITUS WITH DIABETIC POLYNEUROPATHY, WITH LONG-TERM CURRENT USE OF INSULIN (H): ICD-10-CM

## 2021-10-26 DIAGNOSIS — Z00.00 ENCOUNTER FOR MEDICARE ANNUAL WELLNESS EXAM: Primary | ICD-10-CM

## 2021-10-26 DIAGNOSIS — Z98.890 H/O FOOT SURGERY: ICD-10-CM

## 2021-10-26 DIAGNOSIS — E11.42 TYPE 2 DIABETES MELLITUS WITH DIABETIC POLYNEUROPATHY, WITH LONG-TERM CURRENT USE OF INSULIN (H): ICD-10-CM

## 2021-10-26 DIAGNOSIS — E87.6 HYPOKALEMIA: ICD-10-CM

## 2021-10-26 DIAGNOSIS — F43.22 ADJUSTMENT DISORDER WITH ANXIOUS MOOD: ICD-10-CM

## 2021-10-26 DIAGNOSIS — M65.28 CALCIFIC ACHILLES TENDINITIS: ICD-10-CM

## 2021-10-26 DIAGNOSIS — S91.301D OPEN WOUND OF RIGHT HEEL, SUBSEQUENT ENCOUNTER: ICD-10-CM

## 2021-10-26 DIAGNOSIS — I48.0 PAROXYSMAL ATRIAL FIBRILLATION (H): ICD-10-CM

## 2021-10-26 DIAGNOSIS — G89.4 CHRONIC PAIN SYNDROME: ICD-10-CM

## 2021-10-26 DIAGNOSIS — Z91.89 AT RISK FOR PROLONGED QT INTERVAL SYNDROME: ICD-10-CM

## 2021-10-26 DIAGNOSIS — I10 HYPERTENSION GOAL BP (BLOOD PRESSURE) < 140/90: ICD-10-CM

## 2021-10-26 DIAGNOSIS — F51.04 PSYCHOPHYSIOLOGICAL INSOMNIA: ICD-10-CM

## 2021-10-26 DIAGNOSIS — K70.30 ALCOHOLIC CIRRHOSIS OF LIVER WITHOUT ASCITES (H): ICD-10-CM

## 2021-10-26 PROCEDURE — G0438 PPPS, INITIAL VISIT: HCPCS | Mod: 95 | Performed by: FAMILY MEDICINE

## 2021-10-26 NOTE — PROGRESS NOTES
Erwin is a 61 year old who is being evaluated via a billable video visit.      How would you like to obtain your AVS? Pancho  If the video visit is dropped, the invitation should be resent by: Pancho or else Text to cell phone: [phone]  Will anyone else be joining your video visit? No      Video Start Time: 10:07    Assessment & Plan     Has lab appt for potassium tomorrow 10:00 am at [facility].     I will see if can get flu shot appointment at same time otherwise can get from [facility]. Can get shingles vaccine as well if desired.     Feeling much better from a-fib standpoint/working with cardiology and just completed zio patch.     Blood pressue improved with increased propranolol 137/66 today. Pulse during days high 40s-60s, his cardiologist awre.     Managing foot disability with let scooter, dealing mentally better than was although still extremely frustrating.  No longer feeling hopeless.     Starting psychotherapy December with goal to help the brain learn to decrease pain.     Medical cannabis helping with chronic pain and sleep.     No longer using lorazepam.     Aware due for eye exam and is planning that.     Follow up 5-6 months for next a1c/diabetes visit and will let me know if needs/issues in the meantime.     See below for additional:    Encounter for Medicare annual wellness exam  Reviewed preventive health maintenance items, colon screening.     Hypokalemia      Paroxysmal atrial fibrillation (H)      Type 2 diabetes mellitus with diabetic polyneuropathy, with long-term current use of insulin (H)      Open wound of right heel, subsequent encounter      H/O foot surgery      Calcific Achilles tendinitis      Hypertension goal BP (blood pressure) < 140/90      Adjustment disorder with anxious mood      Psychophysiological insomnia      Chronic pain syndrome      Alcoholic cirrhosis of liver without ascites (H)      At risk for prolonged QT interval syndrome  Upper normal per last ekg.   "Will notify his cardiology team. (done/staff message)  Per my knowledge promethazine would be main medication contributor although uses this sparingly.              BMI:   Estimated body mass index is 27.75 kg/m  as calculated from the following:    Height as of 10/19/21: 1.803 m (5' 11\").    Weight as of 10/19/21: 90.3 kg (199 lb).   Weight management plan: Discussed healthy diet and exercise guidelines        Return in about 5 months (around 3/26/2022) for  diabetes.    Joel Daniel Wegener, MD  [facility] is a 61 year old who presents for the following health issues     HPI     Annual Wellness Visit    Patient has been advised of split billing requirements and indicates understanding: Yes     Are you in the first 12 months of your Medicare Part B coverage?  No    Physical Health:    In general, how would you rate your overall physical health? fair    Outside of work, how many days during the week do you exercise?none    Outside of work, approximately how many minutes a day do you exercise?not applicable    If you drink alcohol do you typically have >3 drinks per day or >7 drinks per week? No    Do you usually eat at least 4 servings of fruit and vegetables a day, include whole grains & fiber and avoid regularly eating high fat or \"junk\" foods? NO    Do you have any problems taking medications regularly? No    Do you have any side effects from medications? none    Needs assistance for the following daily activities: transportation and housework    Which of the following safety concerns are present in your home?  none identified     Hearing impairment: No    In the past 6 months, have you been bothered by leaking of urine? no    There were no vitals taken for this visit.  Weight: Unable to obtain due to video visit  Height: Unable to obtain due to video visit  BMI: Unable to obtain due to video visit  Blood Pressure: Provided by patient    Mental Health:    In general, how " would you rate your overall mental or emotional health? fair  PHQ-2 Score:      Do you feel safe in your environment? Yes    Have you ever done Advance Care Planning? (For example, a Health Directive, POLST, or a discussion with a medical provider or your loved ones about your wishes)? No, advance care planning information given to patient to review.  Patient declined advance care planning discussion at this time.    Fall risk:  Fallen 2 or more times in the past year?: Yes  Any fall with injury in the past year?: Yes    Cognitive Screening: Unable to complete due to virtual visit; need for additional assessment in future face-to-face visit    Do you have sleep apnea, excessive snoring or daytime drowsiness?: no    Current providers sharing in care for this patient include:   Patient Care Team:  Wegener, Joel Daniel Irwin, MD as PCP - General (Family Practice)  Kathleen Sen MD as MD (Family Medicine - Sports Medicine)  Elvis Hamilton MD as MD (Family Medicine - Sports Medicine)  Kimberly Ramirez MD as MD (Gastroenterology)  Victor M Anaya DPM as MD (Podiatry)  Elvis Sauceda MD as MD (Anesthesiology)  Kimberly Ramirez MD as Assigned Gastroenterology Provider  Victor M Anaya DPM as Assigned Musculoskeletal Provider  Wegener, Joel Daniel Irwin, MD as Assigned PCP  Breanna Sahni, RN as Registered Nurse (Wound Care)  Karolyn Parra LSW as Lead Care Coordinator (Primary Care - CC)  Markus Fox MD as Assigned Heart and Vascular Provider  Michell Del Cid as Community Health Worker    Patient has been advised of split billing requirements and indicates understanding: Yes    See plan for additional hpi      Review of Systems         Objective           Vitals:  No vitals were obtained today due to virtual visit.    Physical Exam   GENERAL: Healthy, alert and no distress  EYES: Eyes grossly normal to inspection.  No discharge or  erythema, or obvious scleral/conjunctival abnormalities.  RESP: No audible wheeze, cough, or visible cyanosis.  No visible retractions or increased work of breathing.    SKIN: Visible skin clear. No significant rash, abnormal pigmentation or lesions.  NEURO: Cranial nerves grossly intact.  Mentation and speech appropriate for age.  PSYCH: Mentation appears normal, affect normal/bright, judgement and insight intact, normal speech and appearance well-groomed.    Lab Results   Component Value Date    A1C 5.6 10/04/2021    A1C 6.0 04/09/2021    A1C 5.8 07/20/2020    A1C 6.4 10/08/2019    A1C 6.8 04/02/2019    A1C 7.2 01/14/2019   .  Reviewed 10/19 labs: alk phos 155, glucose 142, hgb 16.6, gfr > 90,     Last a1c 10/4 as above.     Last lipids 04/09 ldl 78             Video-Visit Details    Type of service:  Video Visit    Video End Time:10:21    Originating Location (pt. Location): Home    Distant Location (provider location):  [facility]     Platform used for Video Visit: Kameron    He is at risk for falling and has been provided with information to reduce the risk of falling at home.

## 2021-10-26 NOTE — PATIENT INSTRUCTIONS
Has lab appt for potassium tomorrow 10:00 am at [facility].     I will see if can get flu shot appointment at same time otherwise can get from [facility]. Can get shingles vaccine as well if desired.     Feeling much better from a-fib standpoint/working with cardiology and just completed zio patch.     Blood pressue improved with increased propranolol 137/66 today. Pulse during days high 40s-60s, his cardiologist awre.     Managing foot disability with let scooter, dealing mentally better than was although still extremely frustrating.  No longer feeling hopeless.     Starting psychotherapy December with goal to help the brain learn to decrease pain.     Medical cannabis helping with chronic pain and sleep.     No longer using lorazepam.     Aware due for eye exam and is planning that.     Follow up 5-6 months for next a1c/diabetes visit and will let me know if needs/issues in the meantime.       Patient Education   Personalized Prevention Plan  You are due for the preventive services outlined below.  Your care team is available to assist you in scheduling these services.  If you have already completed any of these items, please share that information with your care team to update in your medical record.  Health Maintenance Due   Topic Date Due     ANNUAL REVIEW OF HM ORDERS  Never done     Discuss Advance Care Planning  Never done     HIV Screening  Never done     Zoster (Shingles) Vaccine (1 of 2) Never done     Annual Wellness Visit  04/26/2014     Asthma Action Plan - yearly  09/17/2019     Eye Exam  06/13/2020     Diabetic Foot Exam  10/08/2020     Kidney Microalbumin Urine Test  07/20/2021     Flu Vaccine (1) 09/01/2021       Preventing Falls at Home  A person can fall for many reasons. Older adults may fall because reaction time slows down as we age. Your muscles and joints may get stiff, weak, or less flexible because of illness, medicines, or a physical condition.   Other health problems that make falls  more likely include:     Arthritis    Dizziness or lightheadedness when you stand up (orthostatic hypotension)    History of a stroke    Dizziness    Anemia    Certain medicines taken for mental illness or to control blood pressure.    Problems with balance or gait    Bladder or urinary problems    History of falling    Changes in vision (vision impairment)    Changes in thinking skills and memory (cognitive impairment)  Injuries from a fall can include serious injuries such as broken bones, dislocated joints, internal bleeding and cuts. Injuries like these can limit your independence.   Prevention tips  To help prevent falls and fall-related injuries, follow the tips below.    Floors  To make floors safer:     Put nonskid pads under area rugs.    Remove small rugs.    Replace worn floor coverings.    Tack carpets firmly to each step on carpeted stairs. Put nonskid strips on the edges of uncarpeted stairs.    Keep floors and stairs free of clutter and cords.    Arrange furniture so there are clear pathways.    Clean up any spills right away.  Bathrooms    To make bathrooms safer:     Install grab bars in the tub or shower.    Apply nonskid strips or put a nonskid rubber mat in the tub or shower.    Sit on a bath chair to bathe.    Use bathmats with nonskid backing.  Lighting  To improve visibility in your home:      Keep a flashlight in each room. Or put a lamp next to the bed within easy reach.    Put nightlights in the bedrooms, hallways, kitchen, and bathrooms.    Make sure all stairways have good lighting.    Take your time when going up and down stairs.    Put handrails on both sides of stairs and in walkways for more support. To prevent injury to your wrist or arm, don t use handrails to pull yourself up.    Install grab bars to pull yourself up.    Move or rearrange items that you use often. This will make them easier to find or reach.    Look at your home to find any safety hazards. Especially look at  doorways, walkways, and the driveway. Remove or repair any safety problems that you find.  Other changes to make    Look around to find any safety hazards. Look closely at doorways, walkways, and the driveway. Remove or repair any safety problems that you find.    Wear shoes that fit well.    Take your time when going up and down stairs.    Put handrails on both sides of stairs and in walkways for more support. To prevent injury to your wrist or arm, don t use handrails to pull yourself up.    Install grab bars wherever needed to pull yourself up.    Arrange items that you use often. This will make them easier to find or reach.    [employer] last reviewed this educational content on 3/1/2020    3160-3409 [employer]. All rights reserved. This information is not intended as a substitute for professional medical care. Always follow your healthcare professional's instructions.

## 2021-10-27 ENCOUNTER — PATIENT OUTREACH (OUTPATIENT)
Dept: NURSING | Facility: CLINIC | Age: 61
End: 2021-10-27
Payer: MEDICARE

## 2021-10-27 ENCOUNTER — MYC MEDICAL ADVICE (OUTPATIENT)
Dept: CARDIOLOGY | Facility: CLINIC | Age: 61
End: 2021-10-27

## 2021-10-27 NOTE — PROGRESS NOTES
Clinic Care Coordination Contact    Community Health Worker Follow Up    Spoke with pt this morning.    Care Gaps:     Health Maintenance Due   Topic Date Due     ZOSTER IMMUNIZATION (1 of 2) Never done     ASTHMA ACTION PLAN  09/17/2019     EYE EXAM  06/13/2020     DIABETIC FOOT EXAM  10/08/2020     MICROALBUMIN  07/20/2021     INFLUENZA VACCINE (1) 09/01/2021       Postponed to next visit due.     Goals:   Goals Addressed as of 10/27/2021 at 12:04 PM                    Today       Functional (pt-stated)   50%    Added 9/24/21 by Karolyn Parra LSW      Goal Statement: I will improve my mental health by working with LICSW and then therapy in the next 6 months   Date Goal set: 9/24/21  Barriers: psycho social barriers  Strengths: positive outlook  Date to Achieve By: 03/24/21     Patient expressed understanding of goal:yes  Action steps to achieve this goal:  1. I will do assessment with Favian Fitzgerald (completed)  2. I will work with Favian until I am able to get into therapy through [facility] (ongoing)  3. I will begin therapy at my scheduled appt in Dec            Intervention and Education during outreach: Pt completed assessment with YAMEL Ugarte, at [facility] on 9/27/21. Pt has not made another appointment with Roland. Pt asked about the benefit of doing this. CHW was able to explain that his first scheduled MH visit will be on 12/10/21. [facility] is the bridge between MH services today and MH visits scheduled months in the future. Pt states he does want to make another visit with Roland. Pt was given [facility] phone number, [phone]. CHW offered to transfer call to [facility], however, pt prefers to make the appointment himself.    CHW asks if pt has any further concerns or need for resources as this time. Pt states he does not. CHW made sure pt has CHW contact information. Pt will contact CHW with questions or updates before next outreach.     CHW Plan: CHW will follow up with pt in one month.    [facility]  [facility]  [phone]    _____________________________________________________________    Next Outreach:  11/23/21  Planned Outreach Frequency: monthly  Preferred Phone Number: [phone]    Enrollment Date:  9/16/21  Last Care Plan Assessment:  6/11/21

## 2021-10-29 ENCOUNTER — LAB (OUTPATIENT)
Dept: LAB | Facility: CLINIC | Age: 61
End: 2021-10-29
Payer: MEDICARE

## 2021-10-29 DIAGNOSIS — E87.6 HYPOKALEMIA: ICD-10-CM

## 2021-10-29 LAB
ANION GAP SERPL CALCULATED.3IONS-SCNC: 7 MMOL/L (ref 3–14)
BUN SERPL-MCNC: 14 MG/DL (ref 7–30)
CALCIUM SERPL-MCNC: 9.9 MG/DL (ref 8.5–10.1)
CHLORIDE BLD-SCNC: 105 MMOL/L (ref 94–109)
CO2 SERPL-SCNC: 25 MMOL/L (ref 20–32)
CREAT SERPL-MCNC: 0.76 MG/DL (ref 0.66–1.25)
GFR SERPL CREATININE-BSD FRML MDRD: >90 ML/MIN/1.73M2
GLUCOSE BLD-MCNC: 99 MG/DL (ref 70–99)
POTASSIUM BLD-SCNC: 3.3 MMOL/L (ref 3.4–5.3)
SODIUM SERPL-SCNC: 137 MMOL/L (ref 133–144)

## 2021-10-29 PROCEDURE — 36415 COLL VENOUS BLD VENIPUNCTURE: CPT

## 2021-10-29 PROCEDURE — 80048 BASIC METABOLIC PNL TOTAL CA: CPT

## 2021-11-01 DIAGNOSIS — I10 HYPERTENSION GOAL BP (BLOOD PRESSURE) < 140/90: ICD-10-CM

## 2021-11-01 DIAGNOSIS — I48.0 PAROXYSMAL ATRIAL FIBRILLATION (H): Primary | ICD-10-CM

## 2021-11-01 DIAGNOSIS — E87.6 HYPOKALEMIA: ICD-10-CM

## 2021-11-01 RX ORDER — PROPRANOLOL HYDROCHLORIDE 40 MG/1
40 TABLET ORAL 3 TIMES DAILY
Qty: 360 TABLET | Refills: 3
Start: 2021-11-01 | End: 2022-09-29

## 2021-11-01 RX ORDER — POTASSIUM CHLORIDE 1500 MG/1
20 TABLET, EXTENDED RELEASE ORAL DAILY
Start: 2021-11-01 | End: 2021-12-06

## 2021-11-01 NOTE — PROGRESS NOTES
Markus Fox MD   10/31/2021  7:14 PM CDT Back to Top        Borderline low K  Should take kcl 20 meq qday,repeat bmp in 1 weeek     Patient requests that labs be drawn at [facility]. Lab order faxed. Patient will call and schedule an appointment.

## 2021-11-08 ENCOUNTER — LAB (OUTPATIENT)
Dept: LAB | Facility: CLINIC | Age: 61
End: 2021-11-08
Payer: MEDICARE

## 2021-11-08 DIAGNOSIS — I48.0 PAROXYSMAL ATRIAL FIBRILLATION (H): ICD-10-CM

## 2021-11-08 LAB
ANION GAP SERPL CALCULATED.3IONS-SCNC: 9 MMOL/L (ref 3–14)
BUN SERPL-MCNC: 12 MG/DL (ref 7–30)
CALCIUM SERPL-MCNC: 8.9 MG/DL (ref 8.5–10.1)
CHLORIDE BLD-SCNC: 105 MMOL/L (ref 94–109)
CO2 SERPL-SCNC: 23 MMOL/L (ref 20–32)
CREAT SERPL-MCNC: 0.74 MG/DL (ref 0.66–1.25)
GFR SERPL CREATININE-BSD FRML MDRD: >90 ML/MIN/1.73M2
GLUCOSE BLD-MCNC: 110 MG/DL (ref 70–99)
POTASSIUM BLD-SCNC: 3.5 MMOL/L (ref 3.4–5.3)
SODIUM SERPL-SCNC: 137 MMOL/L (ref 133–144)

## 2021-11-08 PROCEDURE — 36415 COLL VENOUS BLD VENIPUNCTURE: CPT

## 2021-11-08 PROCEDURE — 80048 BASIC METABOLIC PNL TOTAL CA: CPT

## 2021-11-09 ENCOUNTER — MYC MEDICAL ADVICE (OUTPATIENT)
Dept: CARDIOLOGY | Facility: CLINIC | Age: 61
End: 2021-11-09
Payer: MEDICARE

## 2021-11-09 DIAGNOSIS — I10 HYPERTENSION GOAL BP (BLOOD PRESSURE) < 140/90: Primary | ICD-10-CM

## 2021-11-09 DIAGNOSIS — I10 HYPERTENSION GOAL BP (BLOOD PRESSURE) < 140/90: ICD-10-CM

## 2021-11-09 RX ORDER — TRIAMTERENE AND HYDROCHLOROTHIAZIDE 37.5; 25 MG/1; MG/1
1 CAPSULE ORAL EVERY MORNING
Qty: 90 CAPSULE | Refills: 1 | Status: SHIPPED | OUTPATIENT
Start: 2021-11-09 | End: 2022-03-01

## 2021-11-09 NOTE — PROGRESS NOTES
Markus Fox MD   11/8/2021  4:30 PM CST Back to Top        Normal, repeat basic metabolic panel in 1 month     BMP ordered for in 1 month. -[facility] [place], [place]

## 2021-11-15 ENCOUNTER — PATIENT OUTREACH (OUTPATIENT)
Dept: CARE COORDINATION | Facility: CLINIC | Age: 61
End: 2021-11-15
Payer: MEDICARE

## 2021-11-15 ENCOUNTER — MYC MEDICAL ADVICE (OUTPATIENT)
Dept: FAMILY MEDICINE | Facility: CLINIC | Age: 61
End: 2021-11-15
Payer: MEDICARE

## 2021-11-15 NOTE — PROGRESS NOTES
Care Coordination Clinician Chart Review  Situation: Patient chart reviewed by care coordinator.       Background: Care Coordination initial assessment and enrollment to Care Coordination was successful.   Patient centered goals were developed with participation from patient.  [facility] handed patient off to CHW for continued outreach every 30 days.        Assessment: Per chart review, patient outreach completed by CC CHW on 10/27/21.  Patient is actively working to accomplish goals.  Patient's goals remain appropriate and relevant at this time.   Patient is not yet due for updated Plan of Care.  Annual assessment will be due 8/22.      Goals        Functional (pt-stated)       Goal Statement: I will improve my mental health by working with LICSW and then therapy in the next 6 months   Date Goal set: 9/24/21  Barriers: psycho social barriers  Strengths: positive outlook  Date to Achieve By: 03/24/21     Patient expressed understanding of goal:yes  Action steps to achieve this goal:  1. I will do assessment with Favian Fitzgerald (completed)  2. I will work with Favian until I am able to get into therapy through [facility] (ongoing)  3. I will begin therapy at my scheduled appt in Dec                Plan/Recommendations: The patient will continue working with Care Coordination to achieve goals as above.  CHW will involve [facility] VELÁSQUEZ as needed or if patient is ready to move to maintenance.  [facility] will continue to monitor progress to goals and CHW outreaches every 6 weeks.   Plan of Care updated and mailed to patient: Nicolasa Parra DAVION   [facility] Care Coordination  Tel: [phone]

## 2021-11-16 ENCOUNTER — MYC MEDICAL ADVICE (OUTPATIENT)
Dept: FAMILY MEDICINE | Facility: CLINIC | Age: 61
End: 2021-11-16
Payer: MEDICARE

## 2021-11-18 ENCOUNTER — VIRTUAL VISIT (OUTPATIENT)
Dept: BEHAVIORAL HEALTH | Facility: CLINIC | Age: 61
End: 2021-11-18
Payer: MEDICARE

## 2021-11-18 DIAGNOSIS — F06.4 ANXIETY DISORDER DUE TO MEDICAL CONDITION: Primary | ICD-10-CM

## 2021-11-18 PROCEDURE — 90832 PSYTX W PT 30 MINUTES: CPT | Mod: 95 | Performed by: SOCIAL WORKER

## 2021-11-18 ASSESSMENT — PATIENT HEALTH QUESTIONNAIRE - PHQ9
SUM OF ALL RESPONSES TO PHQ QUESTIONS 1-9: 2
10. IF YOU CHECKED OFF ANY PROBLEMS, HOW DIFFICULT HAVE THESE PROBLEMS MADE IT FOR YOU TO DO YOUR WORK, TAKE CARE OF THINGS AT HOME, OR GET ALONG WITH OTHER PEOPLE: SOMEWHAT DIFFICULT
SUM OF ALL RESPONSES TO PHQ QUESTIONS 1-9: 2

## 2021-11-18 ASSESSMENT — ANXIETY QUESTIONNAIRES
2. NOT BEING ABLE TO STOP OR CONTROL WORRYING: NOT AT ALL
3. WORRYING TOO MUCH ABOUT DIFFERENT THINGS: SEVERAL DAYS
6. BECOMING EASILY ANNOYED OR IRRITABLE: NOT AT ALL
GAD7 TOTAL SCORE: 2
1. FEELING NERVOUS, ANXIOUS, OR ON EDGE: NOT AT ALL
8. IF YOU CHECKED OFF ANY PROBLEMS, HOW DIFFICULT HAVE THESE MADE IT FOR YOU TO DO YOUR WORK, TAKE CARE OF THINGS AT HOME, OR GET ALONG WITH OTHER PEOPLE?: SOMEWHAT DIFFICULT
4. TROUBLE RELAXING: SEVERAL DAYS
GAD7 TOTAL SCORE: 2
7. FEELING AFRAID AS IF SOMETHING AWFUL MIGHT HAPPEN: NOT AT ALL
5. BEING SO RESTLESS THAT IT IS HARD TO SIT STILL: NOT AT ALL
7. FEELING AFRAID AS IF SOMETHING AWFUL MIGHT HAPPEN: NOT AT ALL
GAD7 TOTAL SCORE: 2

## 2021-11-18 NOTE — PROGRESS NOTES
[facility]  2021    Behavioral Health Clinician Progress Note    Voice recognition technology may have been utilized for some of the information in this medical record.      Patient Name: Erwin Aguilar         Service Type: Individual           Service Location:  Face to Face in Home / Community      Session Start Time:  730am  Session End Time: 800am      Session Length: 16 - 37      Attendees: Client    Visit Activities (Refresh list every visit): NEW    Video Visit:      Provider verified identity through the following two step process.  Patient provided:  Patient photo and Patient     Telemedicine Visit: The patient's condition can be safely assessed and treated via synchronous audio and visual telemedicine encounter.      Reason for Telemedicine Visit: Services only offered telehealth    Originating Site (Patient Location): Patient's home    Distant Site (Provider Location): Provider Remote Setting- Home Office    Consent:  The patient/guardian has verbally consented to: the potential risks and benefits of telemedicine (video visit) versus in person care; bill my insurance or make self-payment for services provided; and responsibility for payment of non-covered services.     Patient would like the video invitation sent by:  Send to e-mail at: [email or web address]    Mode of Communication:  Video Conference via [facility]    As the provider I attest to compliance with applicable laws and regulations related to telemedicine.    Diagnostic Assessment Date: To be completed  Treatment Plan Review Date: To be completed      Depression and Anxiety Follow-Up    PHQ-9 ([employer]) 2021   No Interest In Doing Things - - -   Feeling Depressed - - -   Trouble Sleeping - - -   Tired / No Energy - - -   No appetite or Over-Eating - - -   Feeling Bad about Self - - -   Trouble Concentrating - - -   Moving Slow or Restless - - -   Suicidal Thoughts -  normal gait and station , no tenderness or deformities present - -   Total Score - - -   1.  Little interest or pleasure in doing things 3 2 0   2.  Feeling down, depressed, or hopeless 0 0 0   3.  Trouble falling or staying asleep, or sleeping too much 3 3 1   4.  Feeling tired or having little energy 3 1 1   5.  Poor appetite or overeating 0 2 0   6.  Feeling bad about yourself 0 0 0   7.  Trouble concentrating 0 0 0   8.  Moving slowly or restless 0 0 0   9.  Suicidal or self-harm thoughts 0 0 0   PHQ-9 Total Score 9 8 2   Difficulty at work, home, or with people - - -   1.  Little interest or pleasure in doing things Nearly every day More than half the days Not at all   2.  Feeling down, depressed, or hopeless Not at all Not at all Not at all   3.  Trouble falling or staying asleep, or sleeping too much Nearly every day Nearly every day Several days   4.  Feeling tired or having little energy Nearly every day Several days Several days   5.  Poor appetite or overeating Not at all More than half the days Not at all   6.  Feeling bad about yourself Not at all Not at all Not at all   7.  Trouble concentrating Not at all Not at all Not at all   8.  Moving slowly or restless Not at all Not at all Not at all   9.  Suicidal or self-harm thoughts Not at all Not at all Not at all   PHQ-9 via [facility] TOTAL SCORE-----> 9 (Mild depression) 8 (Mild depression) 2 (Minimal depression)   Difficulty at work, home, or with people - Somewhat difficult Somewhat difficult     JATIN-7   [employer], 2002; Used with Permission) 4/27/2021 9/27/2021 11/18/2021   Over the last 2 weeks, how often have you been bothered by feeling nervous, anxious or on edge? - - -   Over the last 2 weeks, how often have you been bothered by not being able to stop or control worrying? - - -   Over the last 2 weeks, how often have you been bothered by worrying too much about different things? - - -   Over the last 2 weeks, how often have you been bothered by trouble relaxing? - - -   Over the last 2 weeks, how often have  you been bothered by being so restless that it is hard to sit still? - - -   Over the last 2 weeks, how often have you been bothered by becoming easily annoyed or irritable? - - -   Over the last 2 weeks, how often have you been bothered by feeling afraid as if something awful might happen? - - -   JATIN-7 Total Score =  - - -   1. Feeling nervous, anxious, or on edge More than half the days Nearly every day Not at all   2. Not being able to stop or control worrying Not at all Nearly every day Not at all   3. Worrying too much about different things Not at all Nearly every day Several days   4. Trouble relaxing More than half the days Nearly every day Several days   5. Being so restless that it is hard to sit still More than half the days Several days Not at all   6. Becoming easily annoyed or irritable More than half the days Several days Not at all   7. Feeling afraid, as if something awful might happen Not at all Nearly every day Not at all   JATIN 7 TOTAL SCORE 8 (mild anxiety) 17 (severe anxiety) 2 (minimal anxiety)   1. Feeling nervous, anxious, or on edge 2 3 0   2. Not being able to stop or control worrying 0 3 0   3. Worrying too much about different things 0 3 1   4. Trouble relaxing 2 3 1   5. Being so restless that it is hard to sit still 2 1 0   6. Becoming easily annoyed or irritable 2 1 0   7. Feeling afraid, as if something awful might happen 0 3 0   JATIN-7 Total Score 8 17 2   If you checked any problems, how difficult have they made it for you to do your work, take care of things at home, or get along with other people? - - -       YANIQUE LEVEL:  YANIQUE Score (Last Two) 3/14/2011   YANIQUE Raw Score 49   Activation Score 82.8   YANIQUE Level 4       DATA  Extended Session (60+ minutes): No  Interactive Complexity: No  Crisis: No  [facility] Patient No    Treatment Objective(s) Addressed in This Session:  Target Behavior(s):  Anxiety: will experience a reduction in anxiety, will develop more effective coping skills to manage  "anxiety symptoms, will develop healthy cognitive patterns and beliefs and will increase ability to function adaptively      Current Stressors / Issues:    Patient is a 60-year-old male with significant history of chronic pain that was initially referred to the [facility] by his primary care physician on 9/27/21.  Please see progress notes.  At that time, patient declined therapy as an intervention and was seeking Ativan to manage his chronic pain.    Patient reports that he has had several medical work-ups which have reassured him.  Patient reports \"my heart is okay\".  Patient reports  for period time he was experiencing severe anxiety and increased blood pressure.  Patient reports his blood pressure medications have adjusted and his anxiety is now at baseline.    Patient reports he has watched different shows on pain management and is now excepting the mind does contribute to individuals interpretation of pain.  Patient recalls the [facility] had  discussed a referral to the pain clinic and meeting with the pain psychologist.    Plan    [facility] will reach out to patient's primary care physician to place referral the pain management center to follow-up with the pain psychologist.      Progress on Treatment Objective(s) / Homework:  Minimal progress - CONTEMPLATION (Considering change and yet undecided); Intervened by assessing the negative and positive thinking (ambivalence) about behavior change    Motivational Interviewing    MI Intervention: Supported Autonomy, Collaboration, Evocation, Permission to raise concern or advise and Open-ended questions     Change Talk Expressed by the Patient: Reasons to change    Provider Response to Change Talk: E - Evoked more info from patient about behavior change, A - Affirmed patient's thoughts, decisions, or attempts at behavior change, R - Reflected patient's change talk and S - Summarized patient's change talk statements      PSYCHODYMANIC PSYCHOTHERAPY: Discussed patient's emotional " dynamics and issues and how they impact behaviors,Explored patient's history of relationships and how they impact present behaviors, Explored how to work with and make changes in these schemas and patterns    Care Plan review completed: No    Medication Review:  No changes to current psychiatric medication(s)    Medication Compliance:  Yes    Changes in Health Issues:   None reported    Chemical Use Review:   Substance Use: Chemical use reviewed, no active concerns identified      Tobacco Use: No current tobacco use.      Assessment: Current Emotional / Mental Status (status of significant symptoms):  Risk status (Self / Other harm or suicidal ideation)  Patient denies a history of suicidal ideation, suicide attempts, self-injurious behavior, homicidal ideation, homicidal behavior and and other safety concerns  Patient denies current fears or concerns for personal safety.  Patient denies current or recent suicidal ideation or behaviors.  Patient denies current or recent homicidal ideation or behaviors.  Patient denies current or recent self injurious behavior or ideation.  Patient denies other safety concerns.  A safety and risk management plan has not been developed at this time, however patient was encouraged to call [place] [phone] should there be a change in any of these risk factors.    [place] Suicide Severity Rating Scale (Lifetime/Recent)  [place] Suicide Severity Rating (Lifetime/Recent) 9/29/2021   1. Wish to be Dead (Lifetime) No   1. Wish to be Dead (Recent) No   2. Non-Specific Active Suicidal Thoughts (Lifetime) No   2. Non-Specific Active Suicidal Thoughts (Recent) No   3. Active Suicidal Ideation with any Methods (Not Plan) Without Intent to Act (Lifetime) No   3. Active Suicidal Ideation with any Methods (Not Plan) Without Intent to Act (Recent) No   4. Active Suicidal Ideation with Some Intent to Act, Without Specific Plan (Lifetime) No   4. Active Suicidal Ideation with Some Intent to Act,  Without Specific Plan (Recent) No   Actual Attempt (Lifetime) No   Actual Attempt (Past 3 Months) No         Appearance:   Appropriate   Eye Contact:   Fair   Psychomotor Behavior: Normal   Attitude:   Cooperative   Orientation:   All  Speech   Rate / Production: Normal    Volume:  Normal   Mood:    Normal  Affect:    Appropriate   Thought Content:  Clear   Thought Form:  Coherent  Logical   Insight:    Fair     Diagnoses:  1. Anxiety disorder due to medical condition        Collateral Reports Completed:  Routed note to PCP    Plan: (Homework, other):  Patient was given information about behavioral services and encouraged to schedule a follow up appointment with the clinic [facility] as needed.  He was also given [email or web address]: information about mental health symptoms and treatment options  CD Recommendations: Maintain Sobriety.  Favian Fitzgerald, YAMEL, [facility]

## 2021-11-18 NOTE — Clinical Note
Erwin Christensen reached out to me and requested to follow up with a pain psychologist. Something must have clicked from our initial session 2 months ago.    Could you place the order to the pain clinic so he can meet with a pain psychologist.  I am unable to to that.    Thank you,    Favian

## 2021-11-19 ENCOUNTER — PATIENT OUTREACH (OUTPATIENT)
Dept: NURSING | Facility: CLINIC | Age: 61
End: 2021-11-19
Payer: MEDICARE

## 2021-11-19 ASSESSMENT — ANXIETY QUESTIONNAIRES: GAD7 TOTAL SCORE: 2

## 2021-11-19 ASSESSMENT — PATIENT HEALTH QUESTIONNAIRE - PHQ9: SUM OF ALL RESPONSES TO PHQ QUESTIONS 1-9: 2

## 2021-11-19 NOTE — PROGRESS NOTES
Clinic Care Coordination Contact    Community Health Worker Follow Up    Care Gaps: Did not address today - forgot    Health Maintenance Due   Topic Date Due     ZOSTER IMMUNIZATION (1 of 2) Never done     ASTHMA ACTION PLAN  09/17/2019     EYE EXAM  06/13/2020     DIABETIC FOOT EXAM  10/08/2020     MICROALBUMIN  07/20/2021     COVID-19 Vaccine (3 - Booster) 08/23/2021     INFLUENZA VACCINE (1) 09/01/2021       Postponed to next scheduled CHW visit     Goals:   Goals Addressed as of 11/19/2021 at 9:39 AM                    Today    10/27/21       Functional (pt-stated)   60%  50%    Added 9/24/21 by Karolyn Parra LSW      Goal Statement: I will improve my mental health by working with YAMEL and then therapy in the next 6 months   Date Goal set: 9/24/21  Barriers: psycho social barriers  Strengths: positive outlook  Date to Achieve By: 03/24/21     Patient expressed understanding of goal:yes  Action steps to achieve this goal:  1. I will do assessment with Favian Fitzgerald (completed)  2. I will work with Favian until I am able to get into therapy through [place] (ongoing)  3. I will begin therapy at my scheduled appt in Dec          Intervention and Education during outreach: Pt states he is doing really well. His blood pressure is managed and he is feeling well. Pt had therapy visit with YAMEL Ugarte, yesterday and they discussed pt seeing the pain psychologist through the pain clinic. Pt feels this is a good option. Roland will place a referral for pain psychologist, however, that appointment might not be available til Feb 2022. Pt is scheduled to see Harper Parker  provider, on 12/10/21 and 12/21/21 for mental health visits and will continue to see that provider until able to be scheduled with pain psychologist.     CHW asks if pt has any further concerns or need for resources as this time. Pt states he does not. CHW made sure pt has CHW contact information. Pt will contact CHW with questions or  updates before next outreach.     CHW Plan: CHW will follow up with pt in one month.    Michell Del Cid  Community Health Worker  [facility]  [phone]    ____________________________________________________________    Next Outreach:  12/16/21  Planned Outreach Frequency: monthly  Preferred Phone Number:[phone]     Enrollment Date: 9/16/21  Last Care Plan Assessment: 6/11/21

## 2021-11-23 ENCOUNTER — MYC MEDICAL ADVICE (OUTPATIENT)
Dept: FAMILY MEDICINE | Facility: CLINIC | Age: 61
End: 2021-11-23
Payer: MEDICARE

## 2021-12-03 DIAGNOSIS — E87.6 HYPOKALEMIA: ICD-10-CM

## 2021-12-03 NOTE — PROGRESS NOTES
Erwin is a 61 year old who is being evaluated via a billable video visit.      How would you like to obtain your AVS? MyChart  If the video visit is dropped, the invitation should be resent by: Text to cell phone: [phone]  Will anyone else be joining your video visit? No    Video Start Time: 7:30 AM  Video-Visit Details    Type of service:  Video Visit    Video End Time:7:52 AM    Originating Location (pt. Location): Home    Distant Location (provider location):  [facility]     Platform used for Video Visit: Packback     CC: Markus Fox    Additional 15 minutes on the date of service was spent performing the following:    -Preparing to see the patient  -Obtaining and/or reviewing separately obtained history   -Ordering medications, tests, or procedures   -Documenting clinical information in the electronic or other health record     Thank you for the opportunity to participate in the care of  Erwin Aguilar.    Assessment and Plan:    In summary Erwin Aguilar is a 61 year old year old male here for sleep disturbance.  1. Hypersomnia/Snoring/Frequent nocturnal awakening/Paroxysmal atrial fibrillation   Erwin Aguilar has high risk for obstructive sleep apnea based on the history of hypersomnia, snoring and a crowded airway. I educated the patient on the underlying pathophysiology of obstructive sleep apnea. We reviewed the risks associated with sleep apnea, including increased cardiovascular risk and overall death. We talked about treatments briefly. I recommend getting an baseline nocturnal polysomnography. The patient should return to the clinic to discuss results and treatment option in a patient-centered approach.    History of present illness:    He is a 61 year old male who comes to the [facility] clinic with a chief complaint of excessive daytime sleepiness that been going on for more than a year.  While the patient denies any episodes of witnessed apnea he has occasional  snoring.  The patient was recently diagnosed with paroxysmal atrial fibrillation.  Getting ruled out for sleep apnea as part of the work-up.  Complicating matters further is the fact that the patient suffers from chronic low back pain as well as Achilles tendon pain.  The patient sometimes uses a wheelchair to move around.     Ideal Sleep-Wake Cycle(devoid of societal pressure):    Patient would try to initiate sleep at around 9 PM with a sleep latency of less than 20 minutes. The patient would have 15-20 awakenings. Final wake up time is around 3:30-4:30 AM.    FADI:  FADI Total Score: 20  Total score - [place]: 3 (12/8/2021  5:43 AM)    Patient told to return in one week after the sleep study is interpreted.    Patient Active Problem List   Diagnosis     Generalized anxiety disorder     Type 2 diabetes, HbA1c goal < 7% (H)     Hyperlipidemia LDL goal <100     Hypertension goal BP (blood pressure) < 140/90     Major depressive disorder, recurrent episode, mild (H)     Chronic pain syndrome     GERD (gastroesophageal reflux disease)     Abnormal liver function tests     Health Care Home     Abnormal EMG     Hernia     Diastasis recti     Hypokalemia     Cauda equina syndrome with neurogenic bladder (H)     Wound infection     Cellulitis     Nausea without vomiting     Atopic dermatitis     Muscle spasms of Upper extremity     Edema of left lower extremity     Esophageal varices in cirrhosis (H)     Cirrhosis of liver (H)     Anemia due to blood loss, acute     Skin infection     Superficial thrombophlebitis of arm     Cellulitis of hand     Ganglion cyst     Dry skin dermatitis     Moderate persistent asthma     Open wound of right heel     Fall     Closed fracture of right patella     Right knee pain     Alcoholic cirrhosis of liver without ascites (H)     Lumbosacral radiculopathy     Calculus of gallbladder without cholecystitis without obstruction     Type 2 diabetes mellitus without complication, with long-term  current use of insulin (H)     Wound, open, buttock, right     Type 2 diabetes mellitus with diabetic polyneuropathy, with long-term current use of insulin (H)     Benign neoplasm of skin of trunk, except scrotum     Hematemesis with nausea     Osteoarthritis of lumbar spine, unspecified spinal osteoarthritis complication status     S/P insertion of spinal cord stimulator     Gastroparesis     Alcoholism in remission (H)     Right ankle pain     Acquired calcaneus deformity of right ankle     H/O foot surgery     Calcific Achilles tendinitis     Paroxysmal atrial fibrillation (H)     Adjustment disorder with anxious mood     Psychophysiological insomnia     At risk for prolonged QT interval syndrome     Past Medical History:   Diagnosis Date     Actinic keratosis     aldara     Anxiety      Cancer (H)     squamous cell skin CA     Cauda equina spinal cord injury (H)      Chronic sinusitis 5-1-16     Depressive disorder      Diastasis recti      Esophageal reflux      Esophageal varices in cirrhosis (H) 4/1/2014    Hospitalized for UGI blee 3/28/14, endoscopy revealed bleeding varices.     Essential hypertension, benign      Intermittent asthma      Mild depression (H)      Mixed hyperlipidemia      Nasal polyps 5-1-16     Osteoarthritis of lumbar spine, unspecified spinal osteoarthritis complication status      Other chronic pain      Paroxysmal atrial fibrillation (H) 9/22/2021     SCCA (squamous cell carcinoma) of skin      Seasonal allergic conjunctivitis      Type II or unspecified type diabetes mellitus without mention of complication, not stated as uncontrolled      Unspecified site of spinal cord injury without evidence of spinal bone injury     due to back surgery     Past Surgical History:   Procedure Laterality Date     ABDOMEN SURGERY  2014     BACK SURGERY  August 2009     C APPENDECTOMY  1974     COLONOSCOPY N/A 5/12/2016    Procedure: COMBINED COLONOSCOPY, SINGLE OR MULTIPLE BIOPSY/POLYPECTOMY  BY BIOPSY;  Surgeon: Ana Paula Urbina MD;  Location:  GI     ENDOSCOPY UPPER, COLONOSCOPY, COMBINED  10/19/2011    Procedure:COMBINED ENDOSCOPY UPPER, COLONOSCOPY; Upper Endoscopy, Colonoscopy with Polypectomy x4; Surgeon:AMBAR RODRÍGUEZ; Location:[facility] OR     ENT SURGERY  1-2016    Ongoing since then     ESOPHAGOSCOPY, GASTROSCOPY, DUODENOSCOPY (EGD), COMBINED  3/28/2014    Procedure: COMBINED ESOPHAGOSCOPY, GASTROSCOPY, DUODENOSCOPY (EGD);  EGD, Gastric Biopsies, Esophageal Banding;  Surgeon: Reyna Tovar MD;  Location:  OR     ESOPHAGOSCOPY, GASTROSCOPY, DUODENOSCOPY (EGD), COMBINED  6/9/2014    Procedure: COMBINED ESOPHAGOSCOPY, GASTROSCOPY, DUODENOSCOPY (EGD);  Surgeon: Curtis Mendez MD;  Location:  GI     ESOPHAGOSCOPY, GASTROSCOPY, DUODENOSCOPY (EGD), COMBINED  7/24/2014    Procedure: COMBINED ESOPHAGOSCOPY, GASTROSCOPY, DUODENOSCOPY (EGD);  Surgeon: Gerard Samaniego MD;  Location:  OR     ESOPHAGOSCOPY, GASTROSCOPY, DUODENOSCOPY (EGD), COMBINED N/A 10/31/2014    Procedure: COMBINED ESOPHAGOSCOPY, GASTROSCOPY, DUODENOSCOPY (EGD);  Surgeon: Gerard Samaniego MD;  Location:  OR     ESOPHAGOSCOPY, GASTROSCOPY, DUODENOSCOPY (EGD), COMBINED N/A 5/12/2016    Procedure: COMBINED ESOPHAGOSCOPY, GASTROSCOPY, DUODENOSCOPY (EGD);  Surgeon: Ana Paula Urbina MD;  Location:  GI     ESOPHAGOSCOPY, GASTROSCOPY, DUODENOSCOPY (EGD), COMBINED N/A 8/2/2018    Procedure: COMBINED ESOPHAGOSCOPY, GASTROSCOPY, DUODENOSCOPY (EGD);  EGD;  Surgeon: Yu Wagner MD;  Location:  GI     HCL SQUAMOUS CELL CARCINOMA AG  10/07    left forearm     HERNIORRHAPHY UMBILICAL  11/8/2012    Procedure: HERNIORRHAPHY UMBILICAL;  Open Umbilical Hernia Repair With Mesh ;  Surgeon: Chase Nicholson MD;  Location: [facility] OR     INSERT STIMULATOR DORSAL COLUMN N/A 6/28/2018    Procedure: INSERT STIMULATOR DORSAL COLUMN;;  Surgeon: Elvis Sauceda MD;  Location:   OR     neuro stimulator  2010     REMOVE GENERATOR STIMULATOR (LOCATION) N/A 6/28/2018    Procedure: REMOVE GENERATOR STIMULATOR (LOCATION);  Spinal Cord Stimulator and IPG Explant and Re-Implant of SCS System (Leads and IPG);  Surgeon: Elvis Sauceda MD;  Location: [facility] OR     REPAIR TENDON ACHILLES Right 11/11/2020    Procedure: Right achilles debridement and partial calcaneus excision;  Surgeon: Eh Sandoval MD;  Location: [facility] [place] [facility] OR     SURGICAL HISTORY OF -   1/02    abcess tooth     SURGICAL HISTORY OF -   1999    L4-5 laminectomy, cauda equina syndrome     SURGICAL HISTORY OF -   +    herniated disk repair     TONSILLECTOMY  10 1964     TRANSPOSITION ULNAR NERVE (ELBOW)      right     Current Outpatient Medications   Medication Sig Dispense Refill     ACE/ARB NOT PRESCRIBED, INTENTIONAL, ACE & ARB not prescribed due to Allergy       albuterol (PROAIR HFA/PROVENTIL HFA/VENTOLIN HFA) 108 (90 Base) MCG/ACT inhaler INHALE 2 PUFFS BY MOUTH EVERY 6 HOURS AS NEEDED FOR SHORTNESS OF BREATH/DYSPNEA OR WHEEZING 54 g 3     ammonium lactate (LAC-HYDRIN) 12 % cream Apply topically 2 times daily as needed for dry skin 385 g 3     apixaban ANTICOAGULANT (ELIQUIS) 5 MG tablet Take 1 tablet (5 mg) by mouth 2 times daily 180 tablet 3     baclofen (LIORESAL) 10 MG tablet TAKE 2 TABLETS BY MOUTH THREE TIMES DAILY 180 tablet 8     blood glucose (ACCU-CHEK KARISHMA PLUS) test strip USE TO TEST BLOOD SUGARS ONCE DAILY 100 strip 1     GLOBAL EASE INJECT PEN NEEDLES 32G X 4 MM miscellaneous USE AS DIRECTED EVERY  each 2     insulin glargine (BASAGLAR KWIKPEN) 100 UNIT/ML pen Inject 26 Units Subcutaneous daily 24 mL 0     potassium chloride ER (KLOR-CON M) 20 MEQ CR tablet TAKE 1 TABLET BY MOUTH DAILY 90 tablet 2     promethazine (PHENERGAN) 25 MG tablet TAKE 1 TABLET(25 MG) BY MOUTH THREE TIMES DAILY AS NEEDED FOR NAUSEA 90 tablet 1     propranolol (INDERAL) 40 MG tablet Take 1 tablet (40 mg) by mouth 3  times daily 360 tablet 3     rosuvastatin (CRESTOR) 20 MG tablet TAKE 1 TABLET BY MOUTH EVERY DAY 90 tablet 1     sertraline (ZOLOFT) 100 MG tablet TAKE 2 TABLETS BY MOUTH EVERY  tablet 1     triamterene-HCTZ (DYAZIDE) 37.5-25 MG capsule Take 1 capsule by mouth every morning 90 capsule 1     Levaquin, Lisinopril, Acetaminophen, Amlodipine, Morphine, Quinolones, Spironolactone, and Bactrim [sulfamethoxazole w/trimethoprim]  Social History     Socioeconomic History     Marital status: Single     Spouse name: Not on file     Number of children: 0     Years of education: Not on file     Highest education level: Not on file   Occupational History     Occupation: sales     Employer: UNEMPLOYED   Tobacco Use     Smoking status: Former Smoker     Packs/day: 0.00     Years: 1.00     Pack years: 0.00     Types: Cigarettes     Start date: 10/13/1983     Quit date: 1984     Years since quittin.5     Smokeless tobacco: Never Used   Substance and Sexual Activity     Alcohol use: No     Alcohol/week: 0.0 standard drinks     Comment: a pint of alcohol / day - last drink was 3/28/14     Drug use: Yes     Frequency: 2.0 times per week     Types: Marijuana     Comment: marijuana - occasional     Sexual activity: Not Currently     Partners: Female     Birth control/protection: Abstinence   Other Topics Concern     Parent/sibling w/ CABG, MI or angioplasty before 65F 55M? No   Social History Narrative     Not on file     Social Determinants of Health     Financial Resource Strain: Not on file   Food Insecurity: Not on file   Transportation Needs: No Transportation Needs     Lack of Transportation (Medical): No     Lack of Transportation (Non-Medical): No   Physical Activity: Inactive     Days of Exercise per Week: 0 days     Minutes of Exercise per Session: 0 min   Stress: Not on file   Social Connections: Unknown     Frequency of Communication with Friends and Family: Not on file     Frequency of Social Gatherings with  Friends and Family: Not on file     Attends Yarsanism Services: Not on file     Active Member of Clubs or Organizations: Not on file     Attends Club or Organization Meetings: Not on file     Marital Status:    Intimate Partner Violence: Not on file   Housing Stability: Not on file     Family History   Problem Relation Age of Onset     Cancer Mother         lung     Breast Cancer Mother      Other Cancer Mother      Cancer Father         esophogeal, GERD     Diabetes Brother         amputation, Type 1     Diabetes Brother      Diabetes Brother      Cancer - colorectal No family hx of         Physical Exam:  GEN: NAD,   Head: Normocephalic.  EYES: EOMI  ENT: Oropharynx is clear, Marcelo class 4+ airway.   Psych: normal mood, normal affect  Snore test:( +)     Labs/Studies:     No results found for: PH, PHARTERIAL, PO2, [id number], SAT, PCO2, HCO3, BASEEXCESS, RADHA, BEB  Lab Results   Component Value Date    TSH 1.36 10/09/2018    TSH 1.08 10/05/2015     Lab Results   Component Value Date     (H) 11/08/2021    GLC 99 10/29/2021     Lab Results   Component Value Date    HGB 16.6 10/19/2021    HGB 13.6 10/04/2021     Lab Results   Component Value Date    BUN 12 11/08/2021    BUN 14 10/29/2021    CR 0.74 11/08/2021    CR 0.76 10/29/2021     Lab Results   Component Value Date    AST 28 10/19/2021    AST 13 10/04/2021    ALT 42 10/19/2021    ALT 22 10/04/2021    ALKPHOS 155 (H) 10/19/2021    ALKPHOS 130 10/04/2021    BILITOTAL 1.0 10/19/2021    BILITOTAL 0.7 10/04/2021    BILICONJ 0.0 07/29/2014    BILICONJ 0.0 06/12/2009     Lab Results   Component Value Date    UAMP  07/11/2015     Negative   Cutoff for a negative amphetamine is 500 ng/mL or less.      UBARB  07/11/2015     Negative   Cutoff for a negative barbiturate is 200 ng/mL or less.      BENZODIAZEUR  07/11/2015     Negative   Cutoff for a negative benzodiazepine is 200 ng/mL or less.      UCANN  07/11/2015     Positive   Cutoff for a positive  cannabinoid is greater than 50 ng/mL. This is an   unconfirmed screening result to be used for medical purposes only.      UCOC  07/11/2015     Negative   Cutoff for a negative cocaine is 300 ng/mL or less.      OPIT  07/11/2015     Positive   Cutoff for a positive opiate is greater than 300 ng/mL. This is an unconfirmed   screening result to be used for medical purposes only.         Recent Labs   Lab Test 11/08/21  0942 10/29/21  0946    137   POTASSIUM 3.5 3.3*   CHLORIDE 105 105   CO2 23 25   ANIONGAP 9 7   * 99   BUN 12 14   CR 0.74 0.76   [place] 8.9 9.9       Ferritin   Date Value Ref Range Status   07/29/2014 53 20 - 300 ng/mL Final       Gilbert Rodriguez DO  Board Certified in Internal Medicine and Sleep Medicine    (Note created with Dragon voice recognition and unintended spelling errors and word substitutions may occur)

## 2021-12-06 RX ORDER — POTASSIUM CHLORIDE 1500 MG/1
TABLET, EXTENDED RELEASE ORAL
Qty: 90 TABLET | Refills: 2 | Status: SHIPPED | OUTPATIENT
Start: 2021-12-06 | End: 2022-08-16

## 2021-12-08 ENCOUNTER — VIRTUAL VISIT (OUTPATIENT)
Dept: SLEEP MEDICINE | Facility: CLINIC | Age: 61
End: 2021-12-08
Attending: INTERNAL MEDICINE
Payer: MEDICARE

## 2021-12-08 DIAGNOSIS — I10 HYPERTENSION GOAL BP (BLOOD PRESSURE) < 140/90: ICD-10-CM

## 2021-12-08 DIAGNOSIS — I48.0 PAROXYSMAL ATRIAL FIBRILLATION (H): ICD-10-CM

## 2021-12-08 DIAGNOSIS — R06.83 SNORING: ICD-10-CM

## 2021-12-08 DIAGNOSIS — G47.00 FREQUENT NOCTURNAL AWAKENING: ICD-10-CM

## 2021-12-08 DIAGNOSIS — G47.10 HYPERSOMNIA: Primary | ICD-10-CM

## 2021-12-08 PROCEDURE — 99204 OFFICE O/P NEW MOD 45 MIN: CPT | Mod: 95 | Performed by: INTERNAL MEDICINE

## 2021-12-08 ASSESSMENT — SLEEP AND FATIGUE QUESTIONNAIRES
HOW LIKELY ARE YOU TO NOD OFF OR FALL ASLEEP WHILE SITTING QUIETLY AFTER LUNCH WITHOUT ALCOHOL: WOULD NEVER DOZE
HOW LIKELY ARE YOU TO NOD OFF OR FALL ASLEEP IN A CAR, WHILE STOPPED FOR A FEW MINUTES IN TRAFFIC: WOULD NEVER DOZE
HOW LIKELY ARE YOU TO NOD OFF OR FALL ASLEEP WHILE SITTING INACTIVE IN A PUBLIC PLACE: WOULD NEVER DOZE
HOW LIKELY ARE YOU TO NOD OFF OR FALL ASLEEP WHILE WATCHING TV: WOULD NEVER DOZE
HOW LIKELY ARE YOU TO NOD OFF OR FALL ASLEEP WHILE LYING DOWN TO REST IN THE AFTERNOON WHEN CIRCUMSTANCES PERMIT: HIGH CHANCE OF DOZING
HOW LIKELY ARE YOU TO NOD OFF OR FALL ASLEEP WHILE SITTING AND TALKING TO SOMEONE: WOULD NEVER DOZE
HOW LIKELY ARE YOU TO NOD OFF OR FALL ASLEEP WHEN YOU ARE A PASSENGER IN A CAR FOR AN HOUR WITHOUT A BREAK: WOULD NEVER DOZE
HOW LIKELY ARE YOU TO NOD OFF OR FALL ASLEEP WHILE SITTING AND READING: WOULD NEVER DOZE

## 2021-12-08 NOTE — PATIENT INSTRUCTIONS
Patient education: What is a sleep study?     What is a sleep study? -- A sleep study is a test that measures how well you sleep and checks for sleep problems. For some sleep studies, you stay overnight in a sleep lab at a hospital or sleep center.     What happens during a sleep study? -- Before you go to sleep, a technician attaches small, sticky patches called  electrodes  to your head, chest, and legs. He or she will also place a small tube beneath your nose and might wrap 1 or 2 belts around your chest.   Each of these items has wires that connect to monitors. The monitors record your movement, brain activity, breathing, and other body functions while you sleep.  If you have a history of trouble falling asleep, your doctor might prescribe a medicine to help you fall asleep in the lab. If you have never taken the medicine before, your doctor might ask you take it on a night before your sleep study to see how it affects you.   Why might my doctor order a sleep study? -- Your doctor will order a sleep study if he or she thinks you have sleep apnea or a different condition that makes you:   ?Have sudden jerking leg movements while you sleep, called  periodic limb movements.    ?Feel very sleepy during the day and fall asleep all of a sudden, called  narcolepsy.    ?Have trouble falling asleep or staying asleep over a long period of time, called  chronic insomnia.    ?Do odd things while you sleep, such as walking.  How should I prepare for a sleep study? -- On the day of your sleep study, you should:   ?Avoid alcohol   ?Avoid drinking coffee, tea, sodas, and other drinks that have caffeine in the afternoon and evening   ?Take all of your regular medicines     The cost of care estimate line is [phone]. They are able to give the patient an estimate of the charges and also an estimate of their insurance coverage/patient responsibility.   After your sleep study is performed, please call us at [phone] or  [phone]  to schedule for a follow up to review the results of the sleep study.    Please bring one tab of low dose melatonin 3 mg or less to the night of the study.    Melatonin intake is completely voluntary.    You may take own melatonin after arrival to sleep center. Do not drive or operate machinery after intake of melatonin.

## 2021-12-10 ENCOUNTER — MYC MEDICAL ADVICE (OUTPATIENT)
Dept: BEHAVIORAL HEALTH | Facility: HOSPITAL | Age: 61
End: 2021-12-10
Payer: MEDICARE

## 2021-12-10 ENCOUNTER — VIRTUAL VISIT (OUTPATIENT)
Dept: BEHAVIORAL HEALTH | Facility: HOSPITAL | Age: 61
End: 2021-12-10
Attending: FAMILY MEDICINE
Payer: MEDICARE

## 2021-12-10 DIAGNOSIS — F06.4 ANXIETY DISORDER DUE TO MEDICAL CONDITION: ICD-10-CM

## 2021-12-10 DIAGNOSIS — F41.9 ANXIETY: Primary | ICD-10-CM

## 2021-12-10 PROCEDURE — 90834 PSYTX W PT 45 MINUTES: CPT | Mod: PO | Performed by: SOCIAL WORKER

## 2021-12-10 NOTE — PROGRESS NOTES
[facility]   Mental Health & Addiction Services     Progress Note - Initial Visit    Patient  Name:  Erwin Aguilar Date:12/10/2021         Service Type: Individual     Visit Start Time:8:03  Visit End Time:8:54    Visit #: 1    Attendees: Client    Service Modality:  Video Visit:      Provider verified identity through the following two step process.  Patient provided:  Patient photo and full name    Telemedicine Visit: The patient's condition can be safely assessed and treated via synchronous audio and visual telemedicine encounter.      Reason for Telemedicine Visit: Services only offered telehealth    Originating Site (Patient Location): Patient's home    Distant Site (Provider Location): Provider Remote Setting    Consent:  The patient/guardian has verbally consented to: the potential risks and benefits of telemedicine (video visit) versus in person care; bill my insurance or make self-payment for services provided; and responsibility for payment of non-covered services.     Patient would like the video invitation sent by:  Text to cell phone: [phone]    Mode of Communication:  Video Conference via [employer]- There is some issue with the sound that has been preventing the patient from hearing; whenever he gets a video via [email or web address]. This is an ongoing issue. A phone number for assistance with [email or web address] was provided: [phone].     As the provider I attest to compliance with applicable laws and regulations related to telemedicine.    DATA:   Interactive Complexity: No   Crisis: No      Presenting Concerns/  Current Stressors: Patient has been experiencing physical pain referred to as Chronic Pain syndrome- see chart. He was injured in 2009. Since then, he has been struggling to recover. He had some surgery which did not go well. Instead, it affected his mobility. He  provided details around procedures done to repair his foot, his back and where he is at in life. He is needing either a  wheel chair, or a scooter, or a walker to move around his own home. He is told his foot can not be repaired anymore. 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 His injury has forced him to move to [facility]. He was not  Able to offer support to his partner of 25 years when she was very sick in 2019 due to his own limited mobility. She too is retired from her job as a . They had to move an an assisted living apt. He lived with his partner and their new 5 month old puppy. He notes his anxiety medication( Sertraline, and cannabis medication) are helping in some way. Indeed today his score on JATIN was 2 . He does not feel depressed and his PHQ-9 was 2. However, he has noted sleep disturbance, every night waking up many times due to the pain. He has a sleep study on 1/31/2022 to rule out sleep apnea.. He never had any MH issues before his injury. He notes his medical issues induce his anxiety. Per chart review, he has been experiencing quiet few other medical issues that developed over time following his injury. He sees providers for these issues.  He is very hopeful he can complete his level of acceptance. He notes he is doing a good job of not being stuck on things he can not control. Feels working with someone from pain clinic specifically specialized in pain modalities can help him reach and/or strengthen that acceptance level.    ASSESSMENT:  Mental Status Assessment:  Appearance:   Appropriate   Eye Contact:   Good   Psychomotor Behavior: Normal   Attitude:   Cooperative   Orientation:   Person Place Time Situation  Speech   Rate / Production: Normal/ Responsive   Volume:  Normal   Mood:    Anxious -though reports better managed now  Affect:    Appropriate   Thought Content:  Clear   Thought Form:  Coherent  Logical   Insight:    Good       Safety Issues and Plan for Safety and Risk Management:     [place] Suicide Severity Rating Scale (Short  Version)  [place] Suicide Severity Rating (Short Version) 11/11/2020 11/15/2020 11/23/2020 10/19/2021 12/10/2021   Over the past 2 weeks have you felt down, depressed, or hopeless? no no no no no   Over the past 2 weeks have you had thoughts of killing yourself? no no no no no   Have you ever attempted to kill yourself? no no no no no     Patient denies current fears or concerns for personal safety.  Patient denies current or recent suicidal ideation or behaviors.  Patient denies current or recent homicidal ideation or behaviors.  Patient denies current or recent self injurious behavior or ideation.  Patient denies other safety concerns.  Recommended that patient call 911 or go to the local ED should there be a change in any of these risk factors.  Patient reports there are no firearms in the house.     Diagnostic Criteria: JATIN-7:2  Anxiety:   - Being easily fatigued.    - Sleep disturbance (difficulty staying asleep due to pain).     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 Limited mobility. Sometimes experiencing anxiety.    chronic pain and limited mobility has forced him to SSDI. Home bound and anxiety. Currently sertraline is helping controlling anxiety. Feels he is ready to take the road of acceptance so he does not have to keep focusing on pain.     WHODAS 2.0 (12 item):   WHODAS 2.0 Total Score 11/18/2021 12/9/2021   Total Score 32 34   Total Score MyChart 32 34     Intervention: AIDET was performed.  Active listening, empathy, validation and reinforcement were done. Aleksey supported the patient's option to see a pain therapist at Pain Clinic. Offered to help in the process of finding a provider sooner. Was encouraged to continue using his positivity. Offfered hope for recovery/acceptance.   Collateral Reports Completed:  Routed note to PCP    PLAN:  (Homework, other):  1. Patient discussed options to move to a different provider at pain Clinic. Writer has been in contact with the team to facilitate the transition.     2. Provider recommended the following referrals: keeping up with positive self talk and hope for future.    3.  Suicide Risk and Safety Concerns were assessed for Erwin Aguilar: No history of SI/SA, per chart review and patient's own report.    YAMEL Wesley,[facility] December 10, 2021

## 2021-12-16 ENCOUNTER — PATIENT OUTREACH (OUTPATIENT)
Dept: CARE COORDINATION | Facility: CLINIC | Age: 61
End: 2021-12-16
Payer: MEDICARE

## 2021-12-16 ASSESSMENT — ACTIVITIES OF DAILY LIVING (ADL): DEPENDENT_IADLS:: CLEANING;COOKING;LAUNDRY;SHOPPING;MEAL PREPARATION;MEDICATION MANAGEMENT;MONEY MANAGEMENT

## 2021-12-16 NOTE — PROGRESS NOTES
Care Coordination Clinician Chart Review  Situation: Patient chart reviewed by care coordinator.       Background: Care Coordination initial assessment and enrollment to Care Coordination was successful.   Patient centered goals were developed with participation from patient.  [facility] handed patient off to CHW for continued outreach every 30 days.        Assessment: Per chart review, patient outreach completed by CC CHW on 11/19/21.  Patient is actively working to accomplish goals.  Patient's goals remain appropriate and relevant at this time.   Patient is not yet due for updated Plan of Care.  Annual assessment will be due 10/22.      Goals        Functional (pt-stated)       Goal Statement: I will improve my mental health by working with LICSW and then therapy in the next 6 months   Date Goal set: 9/24/21  Barriers: psycho social barriers  Strengths: positive outlook  Date to Achieve By: 03/24/21     Patient expressed understanding of goal:yes  Action steps to achieve this goal:  1. I will do assessment with Favian Fitzgerald (completed)  2. I will work with Favian until I am able to get into therapy through [place] (ongoing)  3. I will begin therapy at my scheduled appt in Dec                Plan/Recommendations: The patient will continue working with Care Coordination to achieve goals as above.  CHW will involve [facility] VELÁSQUEZ as needed or if patient is ready to move to maintenance.  [facility] will continue to monitor progress to goals and CHW outreaches every 6 weeks.   Plan of Care updated and mailed to patient: Nicolasa Parra [facility] Care Coordination  Tel: [phone]

## 2021-12-21 ENCOUNTER — MYC MEDICAL ADVICE (OUTPATIENT)
Dept: FAMILY MEDICINE | Facility: CLINIC | Age: 61
End: 2021-12-21
Payer: MEDICARE

## 2021-12-21 DIAGNOSIS — J01.90 ACUTE SINUSITIS WITH SYMPTOMS > 10 DAYS: Primary | ICD-10-CM

## 2021-12-21 DIAGNOSIS — J32.9 CHRONIC SINUSITIS, UNSPECIFIED LOCATION: ICD-10-CM

## 2021-12-21 DIAGNOSIS — J32.0 CHRONIC MAXILLARY SINUSITIS: ICD-10-CM

## 2021-12-21 NOTE — TELEPHONE ENCOUNTER
JW,  Please see below [employer] message and advise.  Pended possible order  Thanks,  Randi BARRIOS RN

## 2021-12-23 ENCOUNTER — PATIENT OUTREACH (OUTPATIENT)
Dept: NURSING | Facility: CLINIC | Age: 61
End: 2021-12-23
Payer: MEDICARE

## 2021-12-23 ENCOUNTER — TELEPHONE (OUTPATIENT)
Dept: CARE COORDINATION | Facility: CLINIC | Age: 61
End: 2021-12-23

## 2021-12-23 RX ORDER — CLINDAMYCIN HCL 150 MG
CAPSULE ORAL
Qty: 40 CAPSULE | Refills: 2 | Status: SHIPPED | OUTPATIENT
Start: 2021-12-23 | End: 2022-08-16

## 2021-12-23 NOTE — TELEPHONE ENCOUNTER
[facility] Nurse Triage Team,    I spoke with Erwin this morning and he states he has been corresponding with you about getting some clindamycin nasal medication to be sent to his pharmacy. He has not heard back from the clinic if this has been completed.    Might you be able to discuss this situation with Erwin?    Thanks so much!    Michell Del Cid  Community Health Worker  [facility]  [phone]

## 2021-12-23 NOTE — PROGRESS NOTES
Clinic Care Coordination Contact    Community Health Worker Follow Up    Care Gaps: Discussed today.    Health Maintenance Due   Topic Date Due     ZOSTER IMMUNIZATION (1 of 2) Never done     ASTHMA ACTION PLAN  09/17/2019     EYE EXAM  06/13/2020     DIABETIC FOOT EXAM  10/08/2020     MICROALBUMIN  07/20/2021     INFLUENZA VACCINE (1) 09/01/2021       Care Gap Goal set: Yes    Goals:   Goals Addressed as of 12/23/2021 at 11:09 AM                    Today    11/19/21       2. Medical (pt-stated)         Added 12/23/21 by Michell Del Cid      Goal Statement: I will complete my overdue health maintenance.   Date Goal set: 12/23/21  Barriers: transportation  Strengths: motivated, enrolled in CC  Date to Achieve By: 3/23/21  Patient expressed understanding of goal: Yes  Action steps to achieve this goal:  1. [facility] to [facility] work and flu shot to be completed at the same time.  2. I will notify my care team once health maintenance item(s) are completed.   3. I will contact my Care Coordination staff or care team with any questions or concerns I may have.  4. I will make an eye appointment the beginning of 2022            Functional (pt-stated)   60%  60%    Added 9/24/21 by Karolyn Parra LSW      Goal Statement: I will improve my mental health by working with [facility] and then therapy in the next 6 months   Date Goal set: 9/24/21  Barriers: psycho social barriers  Strengths: positive outlook  Date to Achieve By: 03/24/21     Patient expressed understanding of goal:yes  Action steps to achieve this goal:  1. I will do assessment with Favian Fitzgerald (completed)  2. I will work with Favian until I am able to get into therapy through [facility] (completted)  3. I will begin therapy at my scheduled appt in Dec (completed)  4. I will wait to hear from psychologist through the pain clinic   5. I will make an appointment with the psychologist through the pain clinic  6. I will make ongoing therapy appointments  through the pain clinic    Goal updated 12/23/21            Intervention and Education during outreach: Pt is working with pain clinic to schedule with one of their psychologists. CHW notes in EPIC MyChart communication between pt and YAMEL Roland, regarding this matter. Pt is no longer seeing Roland Fitzgerald, counselor, at the  Clinic. CHW asks how pt's mental health is and he states he is doing really well.    Discussed care gaps with pt. Discussed being overdue for eye exam - pt will make an eye exam in the new year. CHW asked about getting a flu shot. Pt is needing [facility] and would like to get his flu shot at the same time. Pt declined transferring call today to scheduling line to schedule appointment. He will arrange for transportation and then call to schedule this appointment.     CHW asks if pt has any further concerns or need for resources as this time. Pt states he does. Pt has sinusitis, he gets it every year, and has been [facility] with [facility] staff about getting some clindamycin nasal wash to ease some of his pain symptoms. CHW will contact [facility] Triage team to see if they could arrange for this.      CHW made sure pt has CHW contact information. Pt will contact CHW with questions or updates before next outreach.       CHW Plan: CHW will follow up with pt in one month. CHW will contact [facility] RN Triage team to see if they could arrange for this.       Michell Del Cid  Community Health Worker  [facility]  [phone]    _______________________________________________________________    Next Outreach:  1/20/22  Planned Outreach Frequency: monthly  Preferred Phone Number:[phone]      Enrollment Date: 9/16/21  Last Care Plan Assessment: 6/11/21

## 2021-12-30 ENCOUNTER — PATIENT OUTREACH (OUTPATIENT)
Dept: CARE COORDINATION | Facility: CLINIC | Age: 61
End: 2021-12-30
Payer: MEDICARE

## 2021-12-30 NOTE — LETTER
[facility]  Patient Centered Plan of Care  About Me:        Patient Name:  Erwin Aguilar    YOB: 1960  Age:         61 year old   Dontae MRN:    [id number] Telephone Information:  Home Phone [phone]   Mobile [phone]       Address:  [address]  [place] [address] Email address:  [email or web address]      Emergency Contact(s)    Name Relationship Lgl Grd Work Phone Home Phone Mobile Phone   1. BRETT SMITH Roommate   [phone]           Primary language:  English     needed? No   [employer]:  [phone] op. 1  Other communication barriers:Physical impairment    Preferred Method of Communication:  Pancho  Current living arrangement: I live in a private home    Mobility Status/ Medical Equipment: Independent        Health Maintenance  Health Maintenance Reviewed: Due/Overdue   Health Maintenance Due   Topic Date Due     ZOSTER IMMUNIZATION (1 of 2) Never done     ASTHMA ACTION PLAN  09/17/2019     EYE EXAM  06/13/2020     DIABETIC FOOT EXAM  10/08/2020     MICROALBUMIN  07/20/2021     INFLUENZA VACCINE (1) 09/01/2021           My Access Plan  Medical Emergency 911   Primary Clinic Line [facility] [phone]   24 Hour Appointment Line [phone] or  [phone]) (toll-free)   24 Hour Nurse Line [phone] (toll-free)   Preferred Urgent Care Other     [facility]  [phone]     Preferred Pharmacy [id number] - [place], [place] - [address] [place], [place] [address]E AT [facility]     Behavioral Health Crisis Line The National Suicide Prevention Lifeline at 1-304.133.7754 or 911             My Care Team Members  Patient Care Team       Relationship Specialty Notifications Start End    Wegener, Joel Daniel Irwin, MD PCP - General Family Practice  8/4/15     Phone: [phone] Fax: [phone]          [address] [address]    Kathleen Sen MD MD Family Medicine - Sports Medicine  7/14/15     Phone: [phone] Fax: [phone]         [address] [place] [address]    Elvis Hamilton MD MD Family [facility] - Sports [facility]  10/15/15     Phone: [phone] Fax: [phone]         [address] [address]    Kimberly Ramirez MD MD Gastroenterology  1/21/16     Phone: [phone] Fax: [phone]         [address] [place] [address]    Victor M Anaya DPM MD Podiatry  10/26/17     Phone: [phone] Fax: [phone]         [address] [place] [address]    Elvis Sauceda MD MD Anesthesiology  11/29/17     Pager: [phone]         Kimberly Ramirez MD Assigned Gastroenterology Provider   10/23/20     Phone: [phone] Fax: [phone]         [address] [place] [address]    Victor M Anaya DPM Assigned Musculoskeletal Provider   10/23/20     Phone: [phone] Fax: [phone]         [address] [place] [address]    Wegener, Joel Daniel Irwin, MD Assigned PCP   3/7/21     Phone: [phone] Fax: [phone]         [address] [address]    Breanna Sahni, RN Registered Nurse Wound Care  4/27/21     Phone: [phone] Pager: [phone]         [address] [place] [address]    Karolyn Parra LSW Lead Care Coordinator Primary Care - CC  9/15/21     Phone: [phone]         Markus Fox MD Assigned Heart and Vascular Provider   9/19/21     Phone: [phone] Fax: [phone] [address] [facility] [address] [facility] [address]    Michell Del Cid Community Health Worker   9/24/21     Gilbert Rordiguez,  Assigned Sleep Provider   12/12/21     Phone: [phone] Fax: [phone]         [address] [facility] [address] [facility] [address]            My Care Plans  Self  Management and Treatment Plan  Goals and (Comments)  Goals        General     2. Medical (pt-stated)      Notes - Note created  12/23/2021 11:08 AM by Michell Del Cid     Goal Statement: I will complete my overdue health maintenance.   Date Goal set: 12/23/21  Barriers: transportation  Strengths: motivated, enrolled in CC  Date to Achieve By: 3/23/21  Patient expressed understanding of goal: Yes  Action steps to achieve this goal:  1. [facility] to [facility] work and flu shot to be completed at the same time.  2. I will notify my care team once health maintenance item(s) are completed.   3. I will contact my Care Coordination staff or care team with any questions or concerns I may have.  4. I will make an eye appointment the beginning of 2022          Functional (pt-stated)      Notes - Note edited  12/23/2021 11:06 AM by Michell Del Cid     Goal Statement: I will improve my mental health by working with LICSW and then therapy in the next 6 months   Date Goal set: 9/24/21  Barriers: psycho social barriers  Strengths: positive outlook  Date to Achieve By: 03/24/21     Patient expressed understanding of goal:yes  Action steps to achieve this goal:  1. I will do assessment with Favian Fitzgerald (completed)  2. I will work with Favian until I am able to get into therapy through [facility] (completted)  3. I will begin therapy at my scheduled appt in Dec (completed)  4. I will wait to hear from psychologist through the pain clinic   5. I will make an appointment with the psychologist through the pain clinic  6. I will make ongoing therapy appointments through the pain clinic    Goal updated 12/23/21             Action Plans on File:            Depression          Advance Care Plans/Directives Type:   No data recorded    My Medical and Care Information  Problem List   Patient Active Problem List   Diagnosis     Generalized anxiety disorder     Type 2 diabetes, HbA1c goal < 7% (H)     Hyperlipidemia LDL goal <100      Hypertension goal BP (blood pressure) < 140/90     Major depressive disorder, recurrent episode, mild (H)     Chronic pain syndrome     GERD (gastroesophageal reflux disease)     Abnormal liver function tests     Health Care Home     Abnormal EMG     Hernia     Diastasis recti     Hypokalemia     Cauda equina syndrome with neurogenic bladder (H)     Wound infection     Cellulitis     Nausea without vomiting     Atopic dermatitis     Muscle spasms of Upper extremity     Edema of left lower extremity     Esophageal varices in cirrhosis (H)     Cirrhosis of liver (H)     Anemia due to blood loss, acute     Skin infection     Superficial thrombophlebitis of arm     Cellulitis of hand     Ganglion cyst     Dry skin dermatitis     Moderate persistent asthma     Open wound of right heel     Fall     Closed fracture of right patella     Right knee pain     Alcoholic cirrhosis of liver without ascites (H)     Lumbosacral radiculopathy     Calculus of gallbladder without cholecystitis without obstruction     Type 2 diabetes mellitus without complication, with long-term current use of insulin (H)     Wound, open, buttock, right     Type 2 diabetes mellitus with diabetic polyneuropathy, with long-term current use of insulin (H)     Benign neoplasm of skin of trunk, except scrotum     Hematemesis with nausea     Osteoarthritis of lumbar spine, unspecified spinal osteoarthritis complication status     S/P insertion of spinal cord stimulator     Gastroparesis     Alcoholism in remission (H)     Right ankle pain     Acquired calcaneus deformity of right ankle     H/O foot surgery     Calcific Achilles tendinitis     Paroxysmal atrial fibrillation (H)     Adjustment disorder with anxious mood     Psychophysiological insomnia     At risk for prolonged QT interval syndrome      Current Medications and Allergies:  See printed Medication Report.    Care Coordination Start Date: No linked episodes   Frequency of Care Coordination:  monthly     Form Last Updated: 12/30/2021

## 2021-12-30 NOTE — PROGRESS NOTES
Care Coordination Clinician Chart Review  Situation: Patient chart reviewed by care coordinator.       Background: Care Coordination initial assessment and enrollment to Care Coordination was successful.   Patient centered goals were developed with participation from patient.  [facility] handed patient off to CHW for continued outreach every 30 days.        Assessment: Per chart review, patient outreach completed by CC CHW on 12/23/21.  Patient is actively working to accomplish goals.  Patient's goals remain appropriate and relevant at this time.   Patient is not yet due for updated Plan of Care.  Annual assessment will be due 6/11/21.      Goals        2. Medical (pt-stated)       Goal Statement: I will complete my overdue health maintenance.   Date Goal set: 12/23/21  Barriers: transportation  Strengths: motivated, enrolled in   Date to Achieve By: 3/23/21  Patient expressed understanding of goal: Yes  Action steps to achieve this goal:  1. [facility] to [facility] work and flu shot to be completed at the same time.  2. I will notify my care team once health maintenance item(s) are completed.   3. I will contact my Care Coordination staff or care team with any questions or concerns I may have.  4. I will make an eye appointment the beginning of 2022            Functional (pt-stated)       Goal Statement: I will improve my mental health by working with LICSW and then therapy in the next 6 months   Date Goal set: 9/24/21  Barriers: psycho social barriers  Strengths: positive outlook  Date to Achieve By: 03/24/21     Patient expressed understanding of goal:yes  Action steps to achieve this goal:  1. I will do assessment with Favian Fitzgerald (completed)  2. I will work with Favian until I am able to get into therapy through [facility] (completted)  3. I will begin therapy at my scheduled appt in Dec (completed)  4. I will wait to hear from psychologist through the pain clinic   5. I will make an appointment with  the psychologist through the pain clinic  6. I will make ongoing therapy appointments through the pain clinic    Goal updated 12/23/21                Plan/Recommendations: The patient will continue working with Care Coordination to achieve goals as above.  CHW will involve SW CC as needed or if patient is ready to move to maintenance.  SW CC will continue to monitor progress to goals and CHW outreaches every 6 weeks.   Plan of Care updated and mailed to patient: Yes via WILMER Murray   [facility] Care Coordination  Tel: [phone]

## 2022-01-12 ENCOUNTER — MYC MEDICAL ADVICE (OUTPATIENT)
Dept: FAMILY MEDICINE | Facility: CLINIC | Age: 62
End: 2022-01-12
Payer: MEDICARE

## 2022-01-12 DIAGNOSIS — E78.5 HYPERLIPIDEMIA LDL GOAL <100: ICD-10-CM

## 2022-01-14 RX ORDER — ROSUVASTATIN CALCIUM 20 MG/1
20 TABLET, COATED ORAL DAILY
Qty: 30 TABLET | Refills: 2 | Status: SHIPPED | OUTPATIENT
Start: 2022-01-14 | End: 2022-05-12

## 2022-01-16 ENCOUNTER — LAB (OUTPATIENT)
Dept: LAB | Facility: CLINIC | Age: 62
End: 2022-01-16
Payer: MEDICARE

## 2022-01-16 DIAGNOSIS — G89.4 CHRONIC PAIN SYNDROME: ICD-10-CM

## 2022-01-16 DIAGNOSIS — I10 HYPERTENSION GOAL BP (BLOOD PRESSURE) < 140/90: ICD-10-CM

## 2022-01-16 DIAGNOSIS — Z79.899 MEDICATION MANAGEMENT: ICD-10-CM

## 2022-01-16 LAB
ANION GAP SERPL CALCULATED.3IONS-SCNC: 3 MMOL/L (ref 3–14)
BUN SERPL-MCNC: 13 MG/DL (ref 7–30)
CALCIUM SERPL-MCNC: 8.9 MG/DL (ref 8.5–10.1)
CHLORIDE BLD-SCNC: 106 MMOL/L (ref 94–109)
CO2 SERPL-SCNC: 30 MMOL/L (ref 20–32)
CREAT SERPL-MCNC: 0.72 MG/DL (ref 0.66–1.25)
GFR SERPL CREATININE-BSD FRML MDRD: >90 ML/MIN/1.73M2
GLUCOSE BLD-MCNC: 117 MG/DL (ref 70–99)
POTASSIUM BLD-SCNC: 3.6 MMOL/L (ref 3.4–5.3)
SODIUM SERPL-SCNC: 139 MMOL/L (ref 133–144)

## 2022-01-16 PROCEDURE — 80048 BASIC METABOLIC PNL TOTAL CA: CPT

## 2022-01-16 PROCEDURE — 36415 COLL VENOUS BLD VENIPUNCTURE: CPT

## 2022-01-21 ENCOUNTER — VIRTUAL VISIT (OUTPATIENT)
Dept: ANESTHESIOLOGY | Facility: CLINIC | Age: 62
End: 2022-01-21
Payer: MEDICARE

## 2022-01-21 DIAGNOSIS — M79.671 CHRONIC HEEL PAIN, RIGHT: Primary | ICD-10-CM

## 2022-01-21 DIAGNOSIS — M96.1 FAILED BACK SYNDROME: ICD-10-CM

## 2022-01-21 DIAGNOSIS — Z96.89 SPINAL CORD STIMULATOR STATUS: ICD-10-CM

## 2022-01-21 DIAGNOSIS — G89.29 CHRONIC HEEL PAIN, RIGHT: Primary | ICD-10-CM

## 2022-01-21 PROCEDURE — 99214 OFFICE O/P EST MOD 30 MIN: CPT | Mod: 95 | Performed by: NURSE PRACTITIONER

## 2022-01-21 NOTE — PATIENT INSTRUCTIONS
Pain psychology order placed today. Hopefully we will be able to get you in sooner rather than later to see Itzel Faye PsyD, LP.   Make a follow up appointment with me in 8-12 weeks to check in and follow up.

## 2022-01-21 NOTE — PROGRESS NOTES
Erwin is a 61 year old who is being evaluated via a billable video visit.      How would you like to obtain your AVS? MyChart  If the video visit is dropped, the invitation should be resent by: Text to cell phone: [phone]  Will anyone else be joining your video visit? No      Video Start Time: 1:49 PM    [facility] Pain Management     Date of visit: 1/21/2022      Assessment:   Erwin Aguilar is a 61 year old y.o. old male with past medical hx significant for cirrhosis, JATIN, GERD, anemia, and failed back syndrome who presents to the pain clinic with right foot/heel pain.       Visit Diagnoses:  Chronic heel pain, right  (primary encounter diagnosis)  Failed back syndrome  Spinal cord stimulator status      Plan:    1. Erwin is very interested and motivated to try pain psychology. We discussed that biofeedback is not something she would offer but strategies to manage pain better, mindfulness and meditation may be concepts utilized. He is interested.  He seems like he would be a good candidate. Order placed. I will speak with Itzel Faye PsyD, LP and see if we are able to get him in sooner rather than later.  2. Make a follow up appointment with me in 8-12 weeks to check in and follow up.      Nishi WOODARD [facility] Pain Management     -------------------------------------------------    Subjective:    Chief complaint:   Chief Complaint   Patient presents with     Pain follow-up       Interval history:  Erwin Aguilar is a 61 year old male last seen on 5/4/2021.  He is a patient of Dr. Garcia seen in follow up.     Recommendations/plan at the last visit included:  1. Referral to neurology  2. The patient also had reprogramming session today with [employer].   All questions and concerns were answered and the patient was satisfied with the visit.      Follow up: as needed    Since his last visit, Erwin Aguilar reports:  -His pain is worse than it was at last visit.   -He had surgery in  "December of 2020 to repair his achilles tendon, he had a complicated recovery and needed a wound vac for 3 months post operatively. He continues to struggle with this pain, poor function/mobility, and remaining wound. He has continued to struggle with pain since that time, most severe for the last few days.   -He had a Medtronic SCS placed by Dr. Sauceda in 2018. Unfortunately he hasn't been able to have this on since his surgery as he finds that it aggravates his right heel pain. He states he is able to manage his baseline chronic pain on his own from previous back surgery. He had follow up with with Dr. Garcia in May of 2021 and had his SCS reprogrammed.   -He established with a psychologist and has had benefit with this. He is very interested in establishing with pain psychologist.   -He has been certified for medical cannabis and uses on a consistent basis with some benefit but not enough.   -He reports frustration with limited mobility. He was able to purchase an electronic bike and biked 1500 miles last year which he really enjoyed.     HPI per Dr. Garcia:  Mr. Aguilar is a pleasant 60 years old presenting to the clinic. He has a specific question whether the right foot symptoms are consistent with CRPS. He reports that he feels further orthopedic interventions are being denied as some physicians have diagnosed him with CRPS.      The patient has a long h/o pain. In the last 1990s he underwent back surgery. Subsequently he had back surgery in 2009 with Dr. Coy Stein which was complicated by cauda equina. In 2010 he underwent a scs trial and implantation with Dr. Roalnd Canela at [facility] at the time ( currently [facility]). He states much of the cauda equina symptoms resolved by the time. He was able to walk with a cane. He struggled with sleeping at night as the stimulations were uncomfortable in the supine position.      His Medtronic spinal cord stimulator was replaced with \"restroe\" function and made " "neuromodulation comfortable for him throughout the day.      As a sequela of the spine surgeries the patient reports developing a right foot drop. He was unable to use the right foot. He had a bone spur that was extremely bothersome. He experienced some difficult times with his own health, wife health problems and moving homes in June 2020. He finally underwent surgery for the bone spur in Nov 2020 and had extreme bleeding and \" bled through the cast\". He has been diagnosed with cirrhosis and was told by his physicians that liver is stable. He reports his recent INRs have been normal. His cast was removed and he was sent home with a bandaid over the surgical wound.      He reports pain in the right foot which resolved with initiation of cannabis. He has swelling in the right foot below the ankle and states that the foot has been swollen to this extent for years. He states that this is not new. He states that he does not have pain or allodynia in the right foot. He also states that his right foot has been cooler than the left for many years. He said the non healing wound is healing well. He does not believe that the color change, temperature change and swelling are new.      His goal is to be able to have further surgery to fix his foot so he is able to walk. He states that Dr. Naranjo recently at [facility] told him that he may have CRPS. He is asking if he has CRPS.     Current Pain Medication:   Medical cannabis- [facility]    Pain Information:   Pain rating: averages 10/10 on a 0-10 scale.    Current MME: 0    Review of [place] Prescription Monitoring Program (): No concern for abuse or misuse of controlled medications based on this report.     Annual Controlled Substance Agreement/UDS due date: NA    Medications:  Current Outpatient Medications   Medication Sig Dispense Refill     ACE/ARB NOT PRESCRIBED, INTENTIONAL, ACE & ARB not prescribed due to Allergy       albuterol (PROAIR HFA/PROVENTIL HFA/VENTOLIN HFA) 108 (90 " Base) MCG/ACT inhaler INHALE 2 PUFFS BY MOUTH EVERY 6 HOURS AS NEEDED FOR SHORTNESS OF BREATH/DYSPNEA OR WHEEZING 54 g 3     ammonium lactate (LAC-HYDRIN) 12 % cream Apply topically 2 times daily as needed for dry skin 385 g 3     apixaban ANTICOAGULANT (ELIQUIS) 5 MG tablet Take 1 tablet (5 mg) by mouth 2 times daily 180 tablet 3     baclofen (LIORESAL) 10 MG tablet TAKE 2 TABLETS BY MOUTH THREE TIMES DAILY 180 tablet 8     blood glucose (ACCU-CHEK KARISHMA PLUS) test strip USE TO TEST BLOOD SUGARS ONCE DAILY 100 strip 1     clindamycin (CLEOCIN) 150 MG capsule OPEN 1 CAP AND PLACE IN 1 LITER OF NORMAL SALINE & MIX. IRRIGATE WITH 20ML EACH NOSTRIL 4 TIMES/DAY 40 capsule 2     GLOBAL EASE INJECT PEN NEEDLES 32G X 4 MM miscellaneous USE AS DIRECTED EVERY  each 2     insulin glargine (BASAGLAR KWIKPEN) 100 UNIT/ML pen Inject 26 Units Subcutaneous daily 24 mL 0     potassium chloride ER (KLOR-CON M) 20 MEQ CR tablet TAKE 1 TABLET BY MOUTH DAILY 90 tablet 2     promethazine (PHENERGAN) 25 MG tablet TAKE 1 TABLET(25 MG) BY MOUTH THREE TIMES DAILY AS NEEDED FOR NAUSEA 90 tablet 2     propranolol (INDERAL) 40 MG tablet Take 1 tablet (40 mg) by mouth 3 times daily 360 tablet 3     rosuvastatin (CRESTOR) 20 MG tablet Take 1 tablet (20 mg) by mouth daily 30 tablet 2     sertraline (ZOLOFT) 100 MG tablet TAKE 2 TABLETS BY MOUTH EVERY  tablet 1     triamterene-HCTZ (DYAZIDE) 37.5-25 MG capsule Take 1 capsule by mouth every morning 90 capsule 1       Medical History: any changes in medical history since they were last seen? No    Review of Systems: A 10-point review of systems was negative, with the exception of chronic pain issues.      Objective:    Physical Exam:  There were no vitals taken for this visit.  Constitutional: Well developed, well nourished, appears stated age.  HEENT: Head atraumatic, normocephalic. Eyes without conjunctival injection or jaundice. Oropharynx clear. Neck supple. No obvious neck  masses.  Skin: No rash, lesions, or petechiae of exposed skin.   Psychiatric/mental status: Alert, without lethargy or stupor. Speech fluent. Appropriate affect. Mood normal. Able to follow commands without difficulty.     Imaging:  MRI of right ankle was completed on 9/8/2020 and shows:  Impression:  1. Insertional tendinosis of the Achilles tendon with mild  paratendinitis.  2. Fatty infiltration of several of the plantar muscles, with edema in  the flexor digitorum brevis, abductor hallucis, and flexor hallucis  brevis. Overall, favor polyneuropathy and microangiopathy.  3. No marrow signal changes to suggest fracture, stress changes, or  marrow infiltration. No osteochondral lesion.  4. Mild subcutaneous soft tissue edema about the ankle.         BILLING TIME DOCUMENTATION:   The total TIME spent on this patient on the date of the encounter/appointment was 32 minutes.      TOTAL TIME includes:   Time spent preparing to see the patient (reviewing records and tests)   Time spent face to face (or over the phone) with the patient   Time spent ordering tests, medications, procedures and referrals   Time spent Referring and communicating with other healthcare professionals   Time spent documenting clinical information in Epic           Video-Visit Details    Type of service:  Video Visit    Video End Time:2:06 PM    Originating Location (pt. Location): Home    Distant Location (provider location):  [facility] [place]     Platform used for Video Visit: [employer]

## 2022-01-21 NOTE — LETTER
1/21/2022       RE: Erwin Aguilar  [address]  [place] [address]     Dear Colleague,    Thank you for referring your patient, Erwin Aguilar, to the [facility] [place] at [facility]. Please see a copy of my visit note below.    Erwin is a 61 year old who is being evaluated via a billable video visit.      How would you like to obtain your AVS? MyChart  If the video visit is dropped, the invitation should be resent by: Text to cell phone: [phone]  Will anyone else be joining your video visit? No      Video Start Time: 1:49 PM    [facility] Pain Management     Date of visit: 1/21/2022      Assessment:   Erwin Aguilar is a 61 year old y.o. old male with past medical hx significant for cirrhosis, JATIN, GERD, anemia, and failed back syndrome who presents to the pain clinic with right foot/heel pain.       Visit Diagnoses:  Chronic heel pain, right  (primary encounter diagnosis)  Failed back syndrome  Spinal cord stimulator status      Plan:    1. Erwin is very interested and motivated to try pain psychology. We discussed that biofeedback is not something she would offer but strategies to manage pain better, mindfulness and meditation may be concepts utilized. He is interested.  He seems like he would be a good candidate. Order placed. I will speak with Itzel Faye PsyD, LP and see if we are able to get him in sooner rather than later.  2. Make a follow up appointment with me in 8-12 weeks to check in and follow up.      Nishi WOODARD CNP  [facility] Pain Management     -------------------------------------------------    Subjective:    Chief complaint:   Chief Complaint   Patient presents with     Pain follow-up       Interval history:  Erwin Aguilar is a 61 year old male last seen on 5/4/2021.  He is a patient of Dr. Garcia seen in follow up.     Recommendations/plan at the last visit  included:  1. Referral to neurology  2. The patient also had reprogramming session today with [employer].   All questions and concerns were answered and the patient was satisfied with the visit.      Follow up: as needed    Since his last visit, Erwin Aguilar reports:  -His pain is worse than it was at last visit.   -He had surgery in December of 2020 to repair his achilles tendon, he had a complicated recovery and needed a wound vac for 3 months post operatively. He continues to struggle with this pain, poor function/mobility, and remaining wound. He has continued to struggle with pain since that time, most severe for the last few days.   -He had a Medtronic SCS placed by Dr. Sauceda in 2018. Unfortunately he hasn't been able to have this on since his surgery as he finds that it aggravates his right heel pain. He states he is able to manage his baseline chronic pain on his own from previous back surgery. He had follow up with with Dr. Garcia in May of 2021 and had his SCS reprogrammed.   -He established with a psychologist and has had benefit with this. He is very interested in establishing with pain psychologist.   -He has been certified for medical cannabis and uses on a consistent basis with some benefit but not enough.   -He reports frustration with limited mobility. He was able to purchase an electronic bike and biked 1500 miles last year which he really enjoyed.     HPI per Dr. Garcia:  Mr. Aguilar is a pleasant 60 years old presenting to the clinic. He has a specific question whether the right foot symptoms are consistent with CRPS. He reports that he feels further orthopedic interventions are being denied as some physicians have diagnosed him with CRPS.      The patient has a long h/o pain. In the last 1990s he underwent back surgery. Subsequently he had back surgery in 2009 with Dr. Coy Stein which was complicated by cauda equina. In 2010 he underwent a scs trial and implantation with Dr. Roland Canela at  "[facility] at the time ( currently [facility]). He states much of the cauda equina symptoms resolved by the time. He was able to walk with a cane. He struggled with sleeping at night as the stimulations were uncomfortable in the supine position.      His Medtronic spinal cord stimulator was replaced with \"restroe\" function and made neuromodulation comfortable for him throughout the day.      As a sequela of the spine surgeries the patient reports developing a right foot drop. He was unable to use the right foot. He had a bone spur that was extremely bothersome. He experienced some difficult times with his own health, wife health problems and moving homes in June 2020. He finally underwent surgery for the bone spur in Nov 2020 and had extreme bleeding and \" bled through the cast\". He has been diagnosed with cirrhosis and was told by his physicians that liver is stable. He reports his recent INRs have been normal. His cast was removed and he was sent home with a bandaid over the surgical wound.      He reports pain in the right foot which resolved with initiation of cannabis. He has swelling in the right foot below the ankle and states that the foot has been swollen to this extent for years. He states that this is not new. He states that he does not have pain or allodynia in the right foot. He also states that his right foot has been cooler than the left for many years. He said the non healing wound is healing well. He does not believe that the color change, temperature change and swelling are new.      His goal is to be able to have further surgery to fix his foot so he is able to walk. He states that Dr. Naranjo recently at [facility] told him that he may have CRPS. He is asking if he has CRPS.     Current Pain Medication:   Medical cannabis- [facility]    Pain Information:   Pain rating: averages 10/10 on a 0-10 scale.    Current MME: 0    Review of [place] Prescription Monitoring Program (): No concern for abuse or misuse of " controlled medications based on this report.     Annual Controlled Substance Agreement/UDS due date: NA    Medications:  Current Outpatient Medications   Medication Sig Dispense Refill     ACE/ARB NOT PRESCRIBED, INTENTIONAL, ACE & ARB not prescribed due to Allergy       albuterol (PROAIR HFA/PROVENTIL HFA/VENTOLIN HFA) 108 (90 Base) MCG/ACT inhaler INHALE 2 PUFFS BY MOUTH EVERY 6 HOURS AS NEEDED FOR SHORTNESS OF BREATH/DYSPNEA OR WHEEZING 54 g 3     ammonium lactate (LAC-HYDRIN) 12 % cream Apply topically 2 times daily as needed for dry skin 385 g 3     apixaban ANTICOAGULANT (ELIQUIS) 5 MG tablet Take 1 tablet (5 mg) by mouth 2 times daily 180 tablet 3     baclofen (LIORESAL) 10 MG tablet TAKE 2 TABLETS BY MOUTH THREE TIMES DAILY 180 tablet 8     blood glucose (ACCU-CHEK KARISHMA PLUS) test strip USE TO TEST BLOOD SUGARS ONCE DAILY 100 strip 1     clindamycin (CLEOCIN) 150 MG capsule OPEN 1 CAP AND PLACE IN 1 LITER OF NORMAL SALINE & MIX. IRRIGATE WITH 20ML EACH NOSTRIL 4 TIMES/DAY 40 capsule 2     GLOBAL EASE INJECT PEN NEEDLES 32G X 4 MM miscellaneous USE AS DIRECTED EVERY  each 2     insulin glargine (BASAGLAR KWIKPEN) 100 UNIT/ML pen Inject 26 Units Subcutaneous daily 24 mL 0     potassium chloride ER (KLOR-CON M) 20 MEQ CR tablet TAKE 1 TABLET BY MOUTH DAILY 90 tablet 2     promethazine (PHENERGAN) 25 MG tablet TAKE 1 TABLET(25 MG) BY MOUTH THREE TIMES DAILY AS NEEDED FOR NAUSEA 90 tablet 2     propranolol (INDERAL) 40 MG tablet Take 1 tablet (40 mg) by mouth 3 times daily 360 tablet 3     rosuvastatin (CRESTOR) 20 MG tablet Take 1 tablet (20 mg) by mouth daily 30 tablet 2     sertraline (ZOLOFT) 100 MG tablet TAKE 2 TABLETS BY MOUTH EVERY  tablet 1     triamterene-HCTZ (DYAZIDE) 37.5-25 MG capsule Take 1 capsule by mouth every morning 90 capsule 1       Medical History: any changes in medical history since they were last seen? No    Review of Systems: A 10-point review of systems was negative,  with the exception of chronic pain issues.      Objective:    Physical Exam:  There were no vitals taken for this visit.  Constitutional: Well developed, well nourished, appears stated age.  HEENT: Head atraumatic, normocephalic. Eyes without conjunctival injection or jaundice. Oropharynx clear. Neck supple. No obvious neck masses.  Skin: No rash, lesions, or petechiae of exposed skin.   Psychiatric/mental status: Alert, without lethargy or stupor. Speech fluent. Appropriate affect. Mood normal. Able to follow commands without difficulty.     Imaging:  MRI of right ankle was completed on 9/8/2020 and shows:  Impression:  1. Insertional tendinosis of the Achilles tendon with mild  paratendinitis.  2. Fatty infiltration of several of the plantar muscles, with edema in  the flexor digitorum brevis, abductor hallucis, and flexor hallucis  brevis. Overall, favor polyneuropathy and microangiopathy.  3. No marrow signal changes to suggest fracture, stress changes, or  marrow infiltration. No osteochondral lesion.  4. Mild subcutaneous soft tissue edema about the ankle.         BILLING TIME DOCUMENTATION:   The total TIME spent on this patient on the date of the encounter/appointment was 32 minutes.      TOTAL TIME includes:   Time spent preparing to see the patient (reviewing records and tests)   Time spent face to face (or over the phone) with the patient   Time spent ordering tests, medications, procedures and referrals   Time spent Referring and communicating with other healthcare professionals   Time spent documenting clinical information in Epic           Video-Visit Details    Type of service:  Video Visit          Again, thank you for allowing me to participate in the care of your patient.      Sincerely,    VANCE Velasco CNP

## 2022-01-25 ENCOUNTER — PATIENT OUTREACH (OUTPATIENT)
Dept: NURSING | Facility: CLINIC | Age: 62
End: 2022-01-25
Payer: MEDICARE

## 2022-01-25 NOTE — PROGRESS NOTES
"Clinic Care Coordination Contact    Community Health Worker Follow Up    Care Gaps: Pt will schedule eye exam and flu shot in the near future; potassium checked at lab 1/16/22; other blood work to be done at an upcoming GI appointment on 2/23/22.     Health Maintenance Due   Topic Date Due     ZOSTER IMMUNIZATION (1 of 2) Never done     ASTHMA ACTION PLAN  09/17/2019     EYE EXAM  06/13/2020     DIABETIC FOOT EXAM  10/08/2020     MICROALBUMIN  07/20/2021     INFLUENZA VACCINE (1) 09/01/2021       Care gap goal completed.     Goals: All goals completed.    Intervention and Education during outreach: Pt shared at length his health issues, his spouse's health issues over the last decade. Pt's foot is \"useless\" to him due to pain - pt uses WC at home for mobility and knee scooter in public. Pt reports he is not happy with the way he was treated by the foot/ankle surgeon and their department. CHW asks if he has talked to pt relations about this and he states he has. Pt reports getting foot drop for \"failed back surgery\" a decade ago. Pt's spouse may be a COVID long hauler which has led to liver failure, she was in rehab for 6 months, being followed by [facility].     Pt is happy to be scheduled with pain psychologist on 2/10/22 to help him deal with the pain issues he has with his foot and looking to the future with this issues. Pt reports he is using medical cannabis and meds for spasm. Pt reports, \"I need to know how to deal with my pain on a daily basis.\" CHW encouraged pt to work closely with the pain clinic staff and psychologist to help him process and know how to approach his pain moving forward.     Pt scheduled for sleep study 1/31/22 which he reports is the final appointment related to new a-fib dx in Fall 2021.     CHW asks if pt has any further concerns or need for resources as this time. Pt states he does not. CHW has completed all goals with patient and has reviewed no new needs from patient. Maintenance " has been discussed with patient and he is in agreement with moving to maintenance.     CHW made sure pt has CHW contact information. Pt will contact CHW with questions or updates before next outreach.     CHW Plan: CHW will follow up with pt in two months. CHW will route to CHRISTEN Parr, to review for maintenance.     Michell Del Cid  Community Health Worker  [facility]  [phone]    _____________________________________________________________    Next Outreach:  3/15/22  Planned Outreach Frequency: monthly  Preferred Phone Number:[phone]      Enrollment Date: 9/16/21  Last Care Plan Assessment: 6/11/21

## 2022-01-31 ENCOUNTER — PATIENT OUTREACH (OUTPATIENT)
Dept: CARE COORDINATION | Facility: CLINIC | Age: 62
End: 2022-01-31

## 2022-01-31 NOTE — PROGRESS NOTES
Care Coordination Clinician Chart Review  Situation: Patient chart reviewed by care coordinator.       Background: Care Coordination initial assessment and enrollment to Care Coordination was successful.   Patient centered goals were developed with participation from patient.  KALEIGH VELÁSQUEZ handed patient off to CHW for continued outreach every 30 days.        Assessment: Per chart review, patient outreach completed by CC CHW on 1/25/22.  Patient is actively working to accomplish goals.  Patient's goals remain appropriate and relevant at this time.   Patient is not yet due for updated Plan of Care.  Annual assessment will be due 6/11/22.      Goals    None             Plan/Recommendations: Patient approved for maintenance. Next check in- 2months.    Plan of Care updated and mailed to patient: WILMER Skelton   [facility] Care Coordination  Tel: [phone]

## 2022-02-03 ENCOUNTER — MYC MEDICAL ADVICE (OUTPATIENT)
Dept: FAMILY MEDICINE | Facility: CLINIC | Age: 62
End: 2022-02-03
Payer: MEDICARE

## 2022-02-09 ENCOUNTER — MYC MEDICAL ADVICE (OUTPATIENT)
Dept: FAMILY MEDICINE | Facility: CLINIC | Age: 62
End: 2022-02-09
Payer: MEDICARE

## 2022-02-09 DIAGNOSIS — Z79.4 TYPE 2 DIABETES MELLITUS WITHOUT COMPLICATION, WITH LONG-TERM CURRENT USE OF INSULIN (H): ICD-10-CM

## 2022-02-09 DIAGNOSIS — E78.5 HYPERLIPIDEMIA LDL GOAL <100: Primary | ICD-10-CM

## 2022-02-09 DIAGNOSIS — K70.30 ALCOHOLIC CIRRHOSIS OF LIVER WITHOUT ASCITES (H): Primary | ICD-10-CM

## 2022-02-09 DIAGNOSIS — E11.9 TYPE 2 DIABETES MELLITUS WITHOUT COMPLICATION, WITH LONG-TERM CURRENT USE OF INSULIN (H): ICD-10-CM

## 2022-02-09 NOTE — TELEPHONE ENCOUNTER
JW  Please see mychart message  Has lab appointment on 2/12  Pended lipids and A1c  Last A1c on 10/4/21 was at goal  Last lipids done on 4/2021 and WNL.    Please approve if appropriate  Genesis Claros RN

## 2022-02-10 ENCOUNTER — VIRTUAL VISIT (OUTPATIENT)
Dept: PALLIATIVE MEDICINE | Facility: CLINIC | Age: 62
End: 2022-02-10
Attending: NURSE PRACTITIONER
Payer: MEDICARE

## 2022-02-10 DIAGNOSIS — G89.29 CHRONIC HEEL PAIN, RIGHT: ICD-10-CM

## 2022-02-10 DIAGNOSIS — M79.671 CHRONIC HEEL PAIN, RIGHT: ICD-10-CM

## 2022-02-10 PROCEDURE — 96156 HLTH BHV ASSMT/REASSESSMENT: CPT | Mod: 95 | Performed by: PSYCHOLOGIST

## 2022-02-10 NOTE — PROGRESS NOTES
"Erwin Aguilar is a 61 year old male who is being evaluated via a billable video visit.      The patient has been notified of following:     \"This video visit will be conducted via a call between you and your physician/provider. We have found that certain health care needs can be provided without the need for an in-person exam.  This service lets us provide the care you need with a video conversation.    Video visits are billed at different rates depending on your insurance coverage.  Please reach out to your insurance provider with any questions.    If during the course of the call the physician/provider feels a video visit is not appropriate, you will not be charged for this service.\"    Patient has given verbal consent for Video visit? Yes    Video Start Time: 1:00 PM    PATIENT'S TREATMENT GOALS: \"A methodology to reduce pain and anxiety.\"    Treatment goals: Pain psychology can help reduce physical and psychosocial triggers or reinforcers of pain by adapting thoughts, feelings and behaviors to reduce symptoms and increase quality of life. Evidence indicates that the practice of relaxation, meditation, and mindfulness techniques can significantly affect pain levels and overall well being. We discussed today that based upon your preferences and current pain treatment plan, you would likely benefit from adding the following treatment goals and strategies to your visits with pain psychology:     - sleep hygiene  - exploration of the concepts of radical acceptance and window of tolerance  - psychoeducation on sympathetic nervous system response to pain  - review of or development of self-soothing and self-comfort strategies  - development of a regular pain management regimen to include pain flare plan  - strategies to manage anxiety about the pain  - basic psychoeducation on the interplay between mood and pain     IDENTIFYING INFORMATION: The patient is a 61 year old, single individual who was seen today for a " behavioral assessment as part of the evaluation process at the [facility].        PAIN DIAGNOSES per pain provider:        Chronic heel pain, right      Patient's primary complaint today is chronic pain, and they report difficulty with function in relationship to their pain. This patient is referred for consultation by VANCE Huggins, CNP; please see their notes for more details of their pain symptoms. Per chart review of initial visit with Delphine Garcia MD on 5/21/2021:     Kaushik Aguilar is a 60 year old y.o. old male who presents to the pain clinic with with discomfort in the right foot     HPI:  Patient Supplied Answers To the [facility] Pain Questionnaire  [facility] -  Patient Entered Questionnaire/Answers 5/4/2021   What number best describes your pain right now:  0 = No pain  to  10 = Worst pain imaginable 7   How would you describe the pain? other   Which of the following worsen your pain? walking, exercise   Which of the following improve or reduce your pain?  lying down, standing, sitting   What number best describes your average pain for the past week:  0 = No pain  to  10 = Worst pain imaginable 7   What number best describes your LOWEST pain in past 24 hours:  0 = No pain  to  10 = Worst pain imaginable 5   What number best describes your WORST pain in past 24 hours:  0 = No pain  to  10 = Worst pain imaginable 10   When is your pain worst? Constant   What non-medicine treatments have you already had for your pain? pain clinic, physical therapy, spinal cord stimulator   Have you tried treating your pain with medication?  No   Are you currently taking medications for your pain? No      Mr. Aguilar is a pleasant 60 years old presenting to the clinic. He has a specific question whether the right foot symptoms are consistent with CRPS. He reports that he feels further orthopedic interventions are being denied as some physicians have diagnosed him with CRPS.      The patient has a long h/o  "pain. In the last 1990s he underwent back surgery. Subsequently he had back surgery in 2009 with Dr. Coy Stein which was complicated by cauda equina. In 2010 he underwent a scs trial and implantation with Dr. Roland Canela at [facility] at the time ( currently [facility]). He states much of the cauda equina symptoms resolved by the time. He was able to walk with a cane. He struggled with sleeping at night as the stimulations were uncomfortable in the supine position.      His Medtr[employer] spinal cord stimulator was replaced with \"restroe\" function and made neuromodulation comfortable for him throughout the day.      As a sequela of the spine surgeries the patient reports developing a right foot drop. He was unable to use the right foot. He had a bone spur that was extremely bothersome. He experienced some difficult times with his own health, wife health problems and moving homes in June 2020. He finally underwent surgery for the bone spur in Nov 2020 and had extreme bleeding and \" bled through the cast\". He has been diagnosed with cirrhosis and was told by his physicians that liver is stable. He reports his recent INRs have been normal. His cast was removed and he was sent home with a bandaid over the surgical wound.      He reports pain in the right foot which resolved with initiation of cannabis. He has swelling in the right foot below the ankle and states that the foot has been swollen to this extent for years. He states that this is not new. He states that he does not have pain or allodynia in the right foot. He also states that his right foot has been cooler than the left for many years. He said the non healing wound is healing well. He does not believe that the color change, temperature change and swelling are new.      His goal is to be able to have further surgery to fix his foot so he is able to walk. He states that Dr. Naranjo recently at [facility] told him that he may have CRPS. He is asking if he has " CRPS.'    Patient has worked with a pain clinic in the past: [facility].    Pain interferes with the following:    Social: has done very few things socially due to pain and covid  Occupational/Volunteer:  Retired due to pain  Ability to complete ADLS: pain can interfere - awareness he only has so much energy on daily basis and budgets his time; uses wheelchair and knee scooter  Overall Quality of Life:  Significant impact - socially, occupationally, ADLS     Frequency of discussing their pain with others: limited with one friend but even this is limited - doesn't like to burden others with his pain  Frequency of thinking about their pain: pain is almost constantly present  Ability to pace activity or obey limitations: able to take breaks   Ability to relax: 'it's impossible to relax' due to pain  Current stress level: low  Current stressors include: pain    Patient reports the following as it relates to how their pain impacts their sleep hygiene (endorsed in BOLD):  Difficulty falling asleep   Problems mid-awakenings   Poor quality of sleep  Daytime sleepiness or fatigue  Napping    Patient reports obtaining approximately 4-5 hours of sleep per night. This sleep is generally quite disrupted by pain on average 10-15 times per night.   Current exercise regimen/impact of pain on ability to exercise: uses electric bike, walking is very painful - previously very athletic and not being able to move is very frustated    SOCIAL HISTORY:  Patient currently resides: in assisted living with partner in [place]   Patient child/lana: none  Patient's social support network includes: friends and partner  Patient was raised in [place] and has 1 brother (+2 years).   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  Highest level of education completed: BA in psychology and business  History of  service: none  Disability benefits: currently receives SSDI since 2015      MENTAL HEALTH HISTORY:        Mental health diagnosis/es current/past: previously panic attacks  Current symptoms include: feeling stressed out this fall  History of hospitalization for mental health reasons: none    Current psychotropic medications prescribed: Zoloft    Side effects from current psychotropic medications: none  Previous psychotropic medications: Prozac  Patient s mental health history and support includes individual therapy.  Pain's impact on mood or other symptoms: frustrated     Safety Concerns:   Suicidal ideation: none  Homicidal ideation: none  History of childhood abuse/trauma: none   History of adult abuse/trauma: none     History of Head Trauma or evidence of cognitive impairment:  History of concussion a few times in high school - once playing hockey, once playing football. No cognitive impairment reported.    STRENGTHS/LIMITATIONS INCLUDE:   Patient identified the following strengths or resources that will help them succeed in treatment:   Taking care of partner and animals well, , good with people, pretty intuitive.   Patient identified potential barriers to wellness and self-care: pain      CHEMICAL HEALTH BEHAVIORS:    Any illicit drug use: marijuana since age 15 - medical cannabis in past year  Alcohol use: report he previously was quite a heavy drinker due to work and then increased after injury - 3-4 glasses of wine and a cocktail at happy hour, occasionally more. Quit drinking 8 years ago - had esophageal bleed and quit after that.  Caffeine use: none currently - stopped due to cardiac  issues  Nicotine use: none  Any use of prescriptions other than how they were prescribed: none    Previous chemical dependency or other addictive behavior treatment: none          CAGE/ AID QUESTIONNAIRE:   The CAGE screening questions (asking whether patients felt they should cut down on drinking, were annoyed by others criticizing his drinking, felt guilty about use, or ever had an eye opener) were asked of the patient to determine possible ETOH or chemical abuse issues.   His positive answers are as follows.    Have you ever:  None of the patient's responses to the CAGE screening were positive / Negative CAGE score SOBER 8 YEARS    CURRENT MEDICAL CONCERNS:     Past Medical History:   Diagnosis Date     Actinic keratosis     aldara     Anxiety      Cancer (H)     squamous cell skin CA     Cauda equina spinal cord injury (H)      Chronic sinusitis 5-1-16     Depressive disorder      Diastasis recti      Esophageal reflux      Esophageal varices in cirrhosis (H) 4/1/2014    Hospitalized for UGI blee 3/28/14, endoscopy revealed bleeding varices.     Essential hypertension, benign      Intermittent asthma      Mild depression (H)      Mixed hyperlipidemia      Nasal polyps 5-1-16     Osteoarthritis of lumbar spine, unspecified spinal osteoarthritis complication status      Other chronic pain      Paroxysmal atrial fibrillation (H) 9/22/2021     SCCA (squamous cell carcinoma) of skin      Seasonal allergic conjunctivitis      Type II or unspecified type diabetes mellitus without mention of complication, not stated as uncontrolled      Unspecified site of spinal cord injury without evidence of spinal bone injury     due to back surgery                  ASSESSMENT:   Patient is here today to determine whether pain psychology could be of benefit to their pain management services. Patient reports longer standing chronic pain related to failed back surgery which resulted in chronic heel pain from foot drop as a  result of back surgery. He reports subsequent attempts to fix the issue surgically have not been successful. He reports pain is fairly disruptive to his sleep, ability to complete ADLS due to fatigue from pain, impacts ability to socialize, and caused early prison. He presents with a good awareness of triggers and seems open to exploration of ways to manage his pain. He seems to have a good sense of interplay between mood and pain in general, and would like to address anxiety which results from pain or anticipation of pain.     The patient participated in a virtual health and behavioral evaluation (billed 20719).  The limits of confidentiality and mandated reporting requirements were discussed. Time spent with patient: 40 minutes in virtual patient contact for a psychological diagnostic assessment and pain evaluation.     Video-Visit Details    Type of service:  Video Visit    Video End Time (time video stopped): 1:40 PM    Originating Location (pt. Location): Home    Distant Location (provider location):  [facility]     Mode of Communication:  Video Conference via Imer Faye PsyD LP  Licensed Psychologist  Outpatient Clinic Therapist  [facility]

## 2022-02-12 ENCOUNTER — LAB (OUTPATIENT)
Dept: LAB | Facility: CLINIC | Age: 62
End: 2022-02-12
Payer: MEDICARE

## 2022-02-12 DIAGNOSIS — E11.9 TYPE 2 DIABETES MELLITUS WITHOUT COMPLICATION, WITH LONG-TERM CURRENT USE OF INSULIN (H): ICD-10-CM

## 2022-02-12 DIAGNOSIS — Z79.4 TYPE 2 DIABETES MELLITUS WITH DIABETIC POLYNEUROPATHY, WITH LONG-TERM CURRENT USE OF INSULIN (H): Primary | ICD-10-CM

## 2022-02-12 DIAGNOSIS — E78.5 HYPERLIPIDEMIA LDL GOAL <100: ICD-10-CM

## 2022-02-12 DIAGNOSIS — E11.42 TYPE 2 DIABETES MELLITUS WITH DIABETIC POLYNEUROPATHY, WITH LONG-TERM CURRENT USE OF INSULIN (H): Primary | ICD-10-CM

## 2022-02-12 DIAGNOSIS — Z79.4 TYPE 2 DIABETES MELLITUS WITHOUT COMPLICATION, WITH LONG-TERM CURRENT USE OF INSULIN (H): ICD-10-CM

## 2022-02-12 DIAGNOSIS — K70.30 ALCOHOLIC CIRRHOSIS OF LIVER WITHOUT ASCITES (H): ICD-10-CM

## 2022-02-12 LAB
ERYTHROCYTE [DISTWIDTH] IN BLOOD BY AUTOMATED COUNT: 12 % (ref 10–15)
HBA1C MFR BLD: 5.9 % (ref 0–5.6)
HCT VFR BLD AUTO: 40.8 % (ref 40–53)
HGB BLD-MCNC: 14.1 G/DL (ref 13.3–17.7)
MCH RBC QN AUTO: 29.7 PG (ref 26.5–33)
MCHC RBC AUTO-ENTMCNC: 34.6 G/DL (ref 31.5–36.5)
MCV RBC AUTO: 86 FL (ref 78–100)
PLATELET # BLD AUTO: 221 10E3/UL (ref 150–450)
RBC # BLD AUTO: 4.75 10E6/UL (ref 4.4–5.9)
WBC # BLD AUTO: 7.2 10E3/UL (ref 4–11)

## 2022-02-12 PROCEDURE — 80053 COMPREHEN METABOLIC PANEL: CPT

## 2022-02-12 PROCEDURE — 83036 HEMOGLOBIN GLYCOSYLATED A1C: CPT

## 2022-02-12 PROCEDURE — 36415 COLL VENOUS BLD VENIPUNCTURE: CPT

## 2022-02-12 PROCEDURE — 82043 UR ALBUMIN QUANTITATIVE: CPT

## 2022-02-12 PROCEDURE — 80061 LIPID PANEL: CPT

## 2022-02-12 PROCEDURE — 85027 COMPLETE CBC AUTOMATED: CPT

## 2022-02-12 PROCEDURE — 85610 PROTHROMBIN TIME: CPT

## 2022-02-12 PROCEDURE — 82248 BILIRUBIN DIRECT: CPT

## 2022-02-13 LAB
ALBUMIN SERPL-MCNC: 3.7 G/DL (ref 3.4–5)
ALP SERPL-CCNC: 145 U/L (ref 40–150)
ALT SERPL W P-5'-P-CCNC: 25 U/L (ref 0–70)
ANION GAP SERPL CALCULATED.3IONS-SCNC: 5 MMOL/L (ref 3–14)
AST SERPL W P-5'-P-CCNC: 13 U/L (ref 0–45)
BILIRUB DIRECT SERPL-MCNC: 0.1 MG/DL (ref 0–0.2)
BILIRUB SERPL-MCNC: 0.4 MG/DL (ref 0.2–1.3)
BUN SERPL-MCNC: 10 MG/DL (ref 7–30)
CALCIUM SERPL-MCNC: 8.6 MG/DL (ref 8.5–10.1)
CHLORIDE BLD-SCNC: 104 MMOL/L (ref 94–109)
CHOLEST SERPL-MCNC: 161 MG/DL
CO2 SERPL-SCNC: 29 MMOL/L (ref 20–32)
CREAT SERPL-MCNC: 0.81 MG/DL (ref 0.66–1.25)
CREAT UR-MCNC: 102 MG/DL
FASTING STATUS PATIENT QL REPORTED: NO
GFR SERPL CREATININE-BSD FRML MDRD: >90 ML/MIN/1.73M2
GLUCOSE BLD-MCNC: 150 MG/DL (ref 70–99)
HDLC SERPL-MCNC: 36 MG/DL
INR PPP: 1.26 (ref 0.85–1.15)
LDLC SERPL CALC-MCNC: 86 MG/DL
MICROALBUMIN UR-MCNC: 6 MG/L
MICROALBUMIN/CREAT UR: 5.88 MG/G CR (ref 0–17)
NONHDLC SERPL-MCNC: 125 MG/DL
POTASSIUM BLD-SCNC: 3.4 MMOL/L (ref 3.4–5.3)
PROT SERPL-MCNC: 7.5 G/DL (ref 6.8–8.8)
SODIUM SERPL-SCNC: 138 MMOL/L (ref 133–144)
TRIGL SERPL-MCNC: 194 MG/DL

## 2022-02-17 ENCOUNTER — MYC MEDICAL ADVICE (OUTPATIENT)
Dept: FAMILY MEDICINE | Facility: CLINIC | Age: 62
End: 2022-02-17
Payer: MEDICARE

## 2022-02-23 ENCOUNTER — VIRTUAL VISIT (OUTPATIENT)
Dept: GASTROENTEROLOGY | Facility: CLINIC | Age: 62
End: 2022-02-23
Attending: INTERNAL MEDICINE
Payer: MEDICARE

## 2022-02-23 DIAGNOSIS — Z86.0100 HISTORY OF COLONIC POLYPS: Primary | ICD-10-CM

## 2022-02-23 PROCEDURE — 99214 OFFICE O/P EST MOD 30 MIN: CPT | Mod: 95 | Performed by: INTERNAL MEDICINE

## 2022-02-23 PROCEDURE — G0463 HOSPITAL OUTPT CLINIC VISIT: HCPCS | Mod: PN,RTG | Performed by: INTERNAL MEDICINE

## 2022-02-23 ASSESSMENT — PAIN SCALES - GENERAL: PAINLEVEL: EXTREME PAIN (9)

## 2022-02-23 NOTE — PROGRESS NOTES
Erwin is a 61 year old who is being evaluated via a billable video visit.      How would you like to obtain your AVS? MyChart  If the video visit is dropped, the invitation should be resent by: Send to e-mail at: [email or web address]  Will anyone else be joining your video visit? No      Video Start Time: 8:59 AM.    [facility] Hepatology    Follow-up Visit    CONSULTING HEALTHCARE PROVIDER:  Joel Daniel Wegener, MD    CHIEF COMPLAINT AND REASON FOR VISIT:  Alcoholic cirrhosis.    SUBJECTIVE:  Mr. Aguilar is a 61-year-old male with alcoholic cirrhosis.  His initial presentation was gastrointestinal bleeding with hematemesis and melena.  He did have an endoscopy and was found to have bleeding esophageal varices.  He since then abstained from alcoholic beverages and it is, according to him, now almost 8 years that he did not have any alcoholic beverages.  In fact, his MELD score dropped to as low as 6.      He also has other comorbidities which include hypertension, which required for him to visit the emergency room.  This has stabilized with medications.  He also had in 08/2021 atrial fibrillation, which according to him lasted 18 hours and this also resolved and he is on Eliquis for that.      He denies any jaundice.  No lower extremity edema, abdominal distention.  He does not have any lethargy or confusion and denies also any melena, hematemesis or hematochezia.  Mr. Aguilar also does not have problems with abdominal pain or bloating.  He moves his bowels twice a day.  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  He did not have any infections recently and has done his COVID vaccination with Moderna, all 3 doses.      Medical hx Surgical hx   Past Medical History:   Diagnosis Date     Actinic keratosis     aldara     Anxiety      Cancer (H)     squamous cell skin CA     Cauda equina spinal cord  injury (H)      Chronic sinusitis 5-1-16     Depressive disorder      Diastasis recti      Esophageal reflux      Esophageal varices in cirrhosis (H) 4/1/2014    Hospitalized for UGI blee 3/28/14, endoscopy revealed bleeding varices.     Essential hypertension, benign      Intermittent asthma      Mild depression (H)      Mixed hyperlipidemia      Nasal polyps 5-1-16     Osteoarthritis of lumbar spine, unspecified spinal osteoarthritis complication status      Other chronic pain      Paroxysmal atrial fibrillation (H) 9/22/2021     SCCA (squamous cell carcinoma) of skin      Seasonal allergic conjunctivitis      Type II or unspecified type diabetes mellitus without mention of complication, not stated as uncontrolled      Unspecified site of spinal cord injury without evidence of spinal bone injury     due to back surgery      Past Surgical History:   Procedure Laterality Date     ABDOMEN SURGERY  2014     BACK SURGERY  August 2009     COLONOSCOPY N/A 5/12/2016    Procedure: COMBINED COLONOSCOPY, SINGLE OR MULTIPLE BIOPSY/POLYPECTOMY BY BIOPSY;  Surgeon: Ana Paula Urbina MD;  Location:  GI     ENDOSCOPY UPPER, COLONOSCOPY, COMBINED  10/19/2011    Procedure:COMBINED ENDOSCOPY UPPER, COLONOSCOPY; Upper Endoscopy, Colonoscopy with Polypectomy x4; Surgeon:AMBAR RODRÍGUEZ; Location: OR     ENT SURGERY  1-2016    Ongoing since then     ESOPHAGOSCOPY, GASTROSCOPY, DUODENOSCOPY (EGD), COMBINED  3/28/2014    Procedure: COMBINED ESOPHAGOSCOPY, GASTROSCOPY, DUODENOSCOPY (EGD);  EGD, Gastric Biopsies, Esophageal Banding;  Surgeon: Reyna Tovar MD;  Location:  OR     ESOPHAGOSCOPY, GASTROSCOPY, DUODENOSCOPY (EGD), COMBINED  6/9/2014    Procedure: COMBINED ESOPHAGOSCOPY, GASTROSCOPY, DUODENOSCOPY (EGD);  Surgeon: Curtis Mendez MD;  Location:  GI     ESOPHAGOSCOPY, GASTROSCOPY, DUODENOSCOPY (EGD), COMBINED  7/24/2014    Procedure: COMBINED ESOPHAGOSCOPY, GASTROSCOPY, DUODENOSCOPY (EGD);  Surgeon:  Gerard Samaniego MD;  [facility]     ESOPHAGOSCOPY, GASTROSCOPY, DUODENOSCOPY (EGD), COMBINED N/A 10/31/2014    Procedure: COMBINED ESOPHAGOSCOPY, GASTROSCOPY, DUODENOSCOPY (EGD);  Surgeon: Gerard Samaniego MD;  [facility]     ESOPHAGOSCOPY, GASTROSCOPY, DUODENOSCOPY (EGD), COMBINED N/A 5/12/2016    Procedure: COMBINED ESOPHAGOSCOPY, GASTROSCOPY, DUODENOSCOPY (EGD);  Surgeon: Ana Paula Urbina MD;  [facility] GI     ESOPHAGOSCOPY, GASTROSCOPY, DUODENOSCOPY (EGD), COMBINED N/A 8/2/2018    Procedure: COMBINED ESOPHAGOSCOPY, GASTROSCOPY, DUODENOSCOPY (EGD);  EGD;  Surgeon: Yu Wagner MD;  [facility] GI     HCL SQUAMOUS CELL CARCINOMA AG  10/07    left forearm     HERNIORRHAPHY UMBILICAL  11/8/2012    Procedure: HERNIORRHAPHY UMBILICAL;  Open Umbilical Hernia Repair With Mesh ;  Surgeon: Chase Nicholson MD;  [facility]     INSERT STIMULATOR DORSAL COLUMN N/A 6/28/2018    Procedure: INSERT STIMULATOR DORSAL COLUMN;;  Surgeon: Elvis Sauceda MD;  Location:  OR     neuro stimulator  2010     REMOVE GENERATOR STIMULATOR (LOCATION) N/A 6/28/2018    Procedure: REMOVE GENERATOR STIMULATOR (LOCATION);  Spinal Cord Stimulator and IPG Explant and Re-Implant of SCS System (Leads and IPG);  Surgeon: Elvis Sauceda MD;  Location:  OR     REPAIR TENDON ACHILLES Right 11/11/2020    Procedure: Right achilles debridement and partial calcaneus excision;  Surgeon: Eh Sandoval MD;  Location: [facility] OR     SURGICAL HISTORY OF -   1/02    abcess tooth     SURGICAL HISTORY OF -   1999    L4-5 laminectomy, cauda equina syndrome     SURGICAL HISTORY OF -   +    herniated disk repair     TONSILLECTOMY  10 1964     TRANSPOSITION ULNAR NERVE (ELBOW)      right     ZZC APPENDECTOMY  1974          Medications  Prior to Admission medications    Medication Sig Start Date End Date Taking? Authorizing Provider   ACE/ARB NOT PRESCRIBED,  INTENTIONAL, ACE & ARB not prescribed due to Allergy 8/2/12  Yes Ksenia Bean APRN CNP   albuterol (PROAIR HFA/PROVENTIL HFA/VENTOLIN HFA) 108 (90 Base) MCG/ACT inhaler INHALE 2 PUFFS BY MOUTH EVERY 6 HOURS AS NEEDED FOR SHORTNESS OF BREATH/DYSPNEA OR WHEEZING 6/9/21  Yes Wegener, Joel Daniel Irwin, MD   ammonium lactate (LAC-HYDRIN) 12 % cream Apply topically 2 times daily as needed for dry skin 11/1/17  Yes Victor M Anaya DPM   apixaban ANTICOAGULANT (ELIQUIS) 5 MG tablet Take 1 tablet (5 mg) by mouth 2 times daily 9/22/21  Yes Wegener, Joel Daniel Irwin, MD   baclofen (LIORESAL) 10 MG tablet TAKE 2 TABLETS BY MOUTH THREE TIMES DAILY 6/3/21  Yes Wegener, Joel Daniel Irwin, MD   blood glucose (ACCU-CHEK KARISHMA PLUS) test strip USE TO TEST BLOOD SUGARS ONCE DAILY 9/5/19  Yes Wegener, Joel Daniel Irwin, MD   clindamycin (CLEOCIN) 150 MG capsule OPEN 1 CAP AND PLACE IN 1 LITER OF NORMAL SALINE & MIX. IRRIGATE WITH 20ML EACH NOSTRIL 4 TIMES/DAY 12/23/21  Yes Wegener, Joel Daniel Irwin, MD   GLOBAL EASE INJECT PEN NEEDLES 32G X 4 MM miscellaneous USE AS DIRECTED EVERY DAY 9/29/21  Yes Wegener, Joel Daniel Irwin, MD   insulin glargine (BASAGLAR KWIKPEN) 100 UNIT/ML pen Inject 26 Units Subcutaneous daily 12/10/21  Yes Wegener, Joel Daniel Irwin, MD   medical cannabis (Patient's own supply) See Admin Instructions (The purpose of this order is to document that the patient reports taking medical cannabis.  This is not a prescription, and is not used to certify that the patient has a qualifying medical condition.)   Yes Reported, Patient   potassium chloride ER (KLOR-CON M) 20 MEQ CR tablet TAKE 1 TABLET BY MOUTH DAILY 12/6/21  Yes Wegener, Joel Daniel Irwin, MD   promethazine (PHENERGAN) 25 MG tablet TAKE 1 TABLET(25 MG) BY MOUTH THREE TIMES DAILY AS NEEDED FOR NAUSEA 1/3/22  Yes Wegener, Joel Daniel Irwin, MD   propranolol (INDERAL) 40 MG tablet Take 1 tablet (40 mg) by mouth 3 times daily 11/1/21  Yes  Markus Fox MD   rosuvastatin (CRESTOR) 20 MG tablet Take 1 tablet (20 mg) by mouth daily 1/14/22  Yes Wegener, Joel Daniel Irwin, MD   sertraline (ZOLOFT) 100 MG tablet TAKE 2 TABLETS BY MOUTH EVERY DAY 9/28/21  Yes Wegener, Joel Daniel Irwin, MD   triamterene-HCTZ (DYAZIDE) 37.5-25 MG capsule Take 1 capsule by mouth every morning 11/9/21  Yes Markus Fox MD       Allergies  Allergies   Allergen Reactions     Levaquin Anaphylaxis and Rash     Swelling in lip and tongue felt thick     Lisinopril Anaphylaxis     Swollen tongue; inability to swallow; drooling; hives; swollen face, neck, angioedema     Acetaminophen      Hx of cirrhosis      Amlodipine      Swelling, hives, possible angioedema       Morphine      b/p dropped and arms went numb  Hypotension     Quinolones Hives     Spironolactone Unknown     Pt believes himself to be allergic to it; cannot find his old records of this.-mjc      Bactrim [Sulfamethoxazole W/Trimethoprim] Rash       Review of systems  A 10-point review of systems was negative    Examination  There were no vitals taken for this visit.  There is no height or weight on file to calculate BMI.    Gen- well, NAD, A+Ox3, normal color  Psych- normal mood    Laboratory  Lab Results   Component Value Date     02/12/2022     04/09/2021    POTASSIUM 3.4 02/12/2022    POTASSIUM 3.9 04/09/2021    CHLORIDE 104 02/12/2022    CHLORIDE 109 04/09/2021    CO2 29 02/12/2022    CO2 27 04/09/2021    BUN 10 02/12/2022    BUN 7 04/09/2021    CR 0.81 02/12/2022    CR 0.68 04/09/2021       Lab Results   Component Value Date    BILITOTAL 0.4 02/12/2022    BILITOTAL 0.7 04/09/2021    ALT 25 02/12/2022    ALT 27 04/09/2021    AST 13 02/12/2022    AST 14 04/09/2021    ALKPHOS 145 02/12/2022    ALKPHOS 115 04/09/2021       Lab Results   Component Value Date    ALBUMIN 3.7 02/12/2022    ALBUMIN 4.1 04/09/2021    PROTTOTAL 7.5 02/12/2022    PROTTOTAL 8.0 04/09/2021        Lab Results   Component Value  Date    WBC 7.2 02/12/2022    WBC 6.5 04/09/2021    HGB 14.1 02/12/2022    HGB 14.1 04/09/2021    MCV 86 02/12/2022    MCV 85 04/09/2021     02/12/2022     04/09/2021       Lab Results   Component Value Date    INR 1.26 02/12/2022    INR 1.01 04/09/2021     MELD-Na score: 9 at 2/12/2022 11:13 AM  MELD score: 9 at 2/12/2022 11:13 AM  Calculated from:  Serum Creatinine: 0.81 mg/dL (Using min of 1 mg/dL) at 2/12/2022 11:13 AM  Serum Sodium: 138 mmol/L (Using max of 137 mmol/L) at 2/12/2022 11:13 AM  Total Bilirubin: 0.4 mg/dL (Using min of 1 mg/dL) at 2/12/2022 11:13 AM  INR(ratio): 1.26 at 2/12/2022 11:13 AM  Age: 61 years    Radiology      ASSESSMENT AND PLAN:  Cirrhosis of the liver.  Mr. Aguilar has cirrhosis of the liver.  He is relatively doing well as he does not have any fluid retention.  His last endoscopy was normal.  He had his last ultrasound, which did not show any liver lesions, and plan will be to do his labs and his ultrasound every 6 months.  He will need a repeat colonoscopy.      For all his other medical issues, he will follow with his primary care physician.  We plan a 6-month followup, labs every 6 months, ultrasound every 6 months and a colonoscopy, as his last one was not clean, to be scheduled.    I spent 30 minutes on this encounter of 02/23/2022 in chart reviewing, history taking, and documentation.  Some of the time was also spent in counseling and coordination of care.        Kimberly Ramirez MD  Hepatology  [facility]    Video-Visit Details    Type of service:  Video Visit    Video End Time:9:23 AM.    Originating Location (pt. Location): Home    Distant Location (provider location):  [facility] [place]     Platform used for Video Visit: [employer]

## 2022-02-23 NOTE — LETTER
2/23/2022     RE: Erwin Aguilar  [address]  [place] [address]    Dear Colleague,    Thank you for referring your patient, Erwin Aguilar, to the [facility] [place]. Please see a copy of my visit note below.    Erwin is a 61 year old who is being evaluated via a billable video visit.      How would you like to obtain your AVS? MyChart  If the video visit is dropped, the invitation should be resent by: Send to e-mail at: [email or web address]  Will anyone else be joining your video visit? No    Video Start Time: 8:59 AM.    [facility] Hepatology    Follow-up Visit    CONSULTING HEALTHCARE PROVIDER:  Joel Daniel Wegener, MD    CHIEF COMPLAINT AND REASON FOR VISIT:  Alcoholic cirrhosis.    SUBJECTIVE:  Mr. Aguilar is a 61-year-old male with alcoholic cirrhosis.  His initial presentation was gastrointestinal bleeding with hematemesis and melena.  He did have an endoscopy and was found to have bleeding esophageal varices.  He since then abstained from alcoholic beverages and it is, according to him, now almost 8 years that he did not have any alcoholic beverages.  In fact, his MELD score dropped to as low as 6.      He also has other comorbidities which include hypertension, which required for him to visit the emergency room.  This has stabilized with medications.  He also had in 08/2021 atrial fibrillation, which according to him lasted 18 hours and this also resolved and he is on Eliquis for that.      He denies any jaundice.  No lower extremity edema, abdominal distention.  He does not have any lethargy or confusion and denies also any melena, hematemesis or hematochezia.  Mr. Aguilar also does not have problems with abdominal pain or bloating.  He moves his bowels twice a day.  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  He did not have any infections recently  and has done his COVID vaccination with Moderna, all 3 doses.      Medical hx Surgical hx   Past Medical History:   Diagnosis Date     Actinic keratosis     aldara     Anxiety      Cancer (H)     squamous cell skin CA     Cauda equina spinal cord injury (H)      Chronic sinusitis 5-1-16     Depressive disorder      Diastasis recti      Esophageal reflux      Esophageal varices in cirrhosis (H) 4/1/2014    Hospitalized for UGI blee 3/28/14, endoscopy revealed bleeding varices.     Essential hypertension, benign      Intermittent asthma      Mild depression (H)      Mixed hyperlipidemia      Nasal polyps 5-1-16     Osteoarthritis of lumbar spine, unspecified spinal osteoarthritis complication status      Other chronic pain      Paroxysmal atrial fibrillation (H) 9/22/2021     SCCA (squamous cell carcinoma) of skin      Seasonal allergic conjunctivitis      Type II or unspecified type diabetes mellitus without mention of complication, not stated as uncontrolled      Unspecified site of spinal cord injury without evidence of spinal bone injury     due to back surgery      Past Surgical History:   Procedure Laterality Date     ABDOMEN SURGERY  2014     BACK SURGERY  August 2009     COLONOSCOPY N/A 5/12/2016    Procedure: COMBINED COLONOSCOPY, SINGLE OR MULTIPLE BIOPSY/POLYPECTOMY BY BIOPSY;  Surgeon: Ana Paula Urbina MD;  Location:  GI     ENDOSCOPY UPPER, COLONOSCOPY, COMBINED  10/19/2011    Procedure:COMBINED ENDOSCOPY UPPER, COLONOSCOPY; Upper Endoscopy, Colonoscopy with Polypectomy x4; Surgeon:AMBAR RODRÍGUEZ; Location: OR     ENT SURGERY  1-2016    Ongoing since then     ESOPHAGOSCOPY, GASTROSCOPY, DUODENOSCOPY (EGD), COMBINED  3/28/2014    Procedure: COMBINED ESOPHAGOSCOPY, GASTROSCOPY, DUODENOSCOPY (EGD);  EGD, Gastric Biopsies, Esophageal Banding;  Surgeon: Reyna Tovar MD;  Location:  OR     ESOPHAGOSCOPY, GASTROSCOPY, DUODENOSCOPY (EGD), COMBINED  6/9/2014    Procedure:  COMBINED ESOPHAGOSCOPY, GASTROSCOPY, DUODENOSCOPY (EGD);  Surgeon: Curtis Mednez MD;  [facility]     ESOPHAGOSCOPY, GASTROSCOPY, DUODENOSCOPY (EGD), COMBINED  7/24/2014    Procedure: COMBINED ESOPHAGOSCOPY, GASTROSCOPY, DUODENOSCOPY (EGD);  Surgeon: Gerard Samaniego MD;  [facility]     ESOPHAGOSCOPY, GASTROSCOPY, DUODENOSCOPY (EGD), COMBINED N/A 10/31/2014    Procedure: COMBINED ESOPHAGOSCOPY, GASTROSCOPY, DUODENOSCOPY (EGD);  Surgeon: Gerard Samaniego MD;  [facility]     ESOPHAGOSCOPY, GASTROSCOPY, DUODENOSCOPY (EGD), COMBINED N/A 5/12/2016    Procedure: COMBINED ESOPHAGOSCOPY, GASTROSCOPY, DUODENOSCOPY (EGD);  Surgeon: Ana Paula Urbina MD;  [facility] GI     ESOPHAGOSCOPY, GASTROSCOPY, DUODENOSCOPY (EGD), COMBINED N/A 8/2/2018    Procedure: COMBINED ESOPHAGOSCOPY, GASTROSCOPY, DUODENOSCOPY (EGD);  EGD;  Surgeon: Yu Wagner MD;  [facility]     HCL SQUAMOUS CELL CARCINOMA AG  10/07    left forearm     HERNIORRHAPHY UMBILICAL  11/8/2012    Procedure: HERNIORRHAPHY UMBILICAL;  Open Umbilical Hernia Repair With Mesh ;  Surgeon: Chase Nicholson MD;  [facility]     INSERT STIMULATOR DORSAL COLUMN N/A 6/28/2018    Procedure: INSERT STIMULATOR DORSAL COLUMN;;  Surgeon: Elvis Sauceda MD;  [facility] OR     neuro stimulator  2010     REMOVE GENERATOR STIMULATOR (LOCATION) N/A 6/28/2018    Procedure: REMOVE GENERATOR STIMULATOR (LOCATION);  Spinal Cord Stimulator and IPG Explant and Re-Implant of SCS System (Leads and IPG);  Surgeon: Elvis Sauceda MD;  [facility]     REPAIR TENDON ACHILLES Right 11/11/2020    Procedure: Right achilles debridement and partial calcaneus excision;  Surgeon: Eh Sandoval MD;  Location: [facility] OR     SURGICAL HISTORY OF   1/02    abcess tooth     SURGICAL HISTORY OF -   1999    L4-5 laminectomy, cauda equina syndrome     SURGICAL HISTORY OF -   +    herniated disk  repair     TONSILLECTOMY  10 1964     TRANSPOSITION ULNAR NERVE (ELBOW)      right     ZZC APPENDECTOMY  1974        Medications  Prior to Admission medications    Medication Sig Start Date End Date Taking? Authorizing Provider   ACE/ARB NOT PRESCRIBED, INTENTIONAL, ACE & ARB not prescribed due to Allergy 8/2/12  Yes Ksenia Bean APRN CNP   albuterol (PROAIR HFA/PROVENTIL HFA/VENTOLIN HFA) 108 (90 Base) MCG/ACT inhaler INHALE 2 PUFFS BY MOUTH EVERY 6 HOURS AS NEEDED FOR SHORTNESS OF BREATH/DYSPNEA OR WHEEZING 6/9/21  Yes Wegener, Joel Daniel Irwin, MD   ammonium lactate (LAC-HYDRIN) 12 % cream Apply topically 2 times daily as needed for dry skin 11/1/17  Yes Victor M Anaya DPM   apixaban ANTICOAGULANT (ELIQUIS) 5 MG tablet Take 1 tablet (5 mg) by mouth 2 times daily 9/22/21  Yes Wegener, Joel Daniel Irwin, MD   baclofen (LIORESAL) 10 MG tablet TAKE 2 TABLETS BY MOUTH THREE TIMES DAILY 6/3/21  Yes Wegener, Joel Daniel Irwin, MD   blood glucose (ACCU-CHEK KARISHMA PLUS) test strip USE TO TEST BLOOD SUGARS ONCE DAILY 9/5/19  Yes Wegener, Joel Daniel Irwin, MD   clindamycin (CLEOCIN) 150 MG capsule OPEN 1 CAP AND PLACE IN 1 LITER OF NORMAL SALINE & MIX. IRRIGATE WITH 20ML EACH NOSTRIL 4 TIMES/DAY 12/23/21  Yes Wegener, Joel Daniel Irwin, MD   GLOBAL EASE INJECT PEN NEEDLES 32G X 4 MM miscellaneous USE AS DIRECTED EVERY DAY 9/29/21  Yes Wegener, Joel Daniel Irwin, MD   insulin glargine (BASAGLAR KWIKPEN) 100 UNIT/ML pen Inject 26 Units Subcutaneous daily 12/10/21  Yes Wegener, Joel Daniel Irwin, MD   medical cannabis (Patient's own supply) See Admin Instructions (The purpose of this order is to document that the patient reports taking medical cannabis.  This is not a prescription, and is not used to certify that the patient has a qualifying medical condition.)   Yes Reported, Patient   potassium chloride ER (KLOR-CON M) 20 MEQ CR tablet TAKE 1 TABLET BY MOUTH DAILY 12/6/21  Yes Wegener, Joel Daniel  MD Cassius   promethazine (PHENERGAN) 25 MG tablet TAKE 1 TABLET(25 MG) BY MOUTH THREE TIMES DAILY AS NEEDED FOR NAUSEA 1/3/22  Yes Wegener, Joel Daniel Irwin, MD   propranolol (INDERAL) 40 MG tablet Take 1 tablet (40 mg) by mouth 3 times daily 11/1/21  Yes Markus Fox MD   rosuvastatin (CRESTOR) 20 MG tablet Take 1 tablet (20 mg) by mouth daily 1/14/22  Yes Wegener, Joel Daniel Irwin, MD   sertraline (ZOLOFT) 100 MG tablet TAKE 2 TABLETS BY MOUTH EVERY DAY 9/28/21  Yes Wegener, Joel Daniel Irwin, MD   triamterene-HCTZ (DYAZIDE) 37.5-25 MG capsule Take 1 capsule by mouth every morning 11/9/21  Yes Markus Fox MD       Allergies  Allergies   Allergen Reactions     Levaquin Anaphylaxis and Rash     Swelling in lip and tongue felt thick     Lisinopril Anaphylaxis     Swollen tongue; inability to swallow; drooling; hives; swollen face, neck, angioedema     Acetaminophen      Hx of cirrhosis      Amlodipine      Swelling, hives, possible angioedema       Morphine      b/p dropped and arms went numb  Hypotension     Quinolones Hives     Spironolactone Unknown     Pt believes himself to be allergic to it; cannot find his old records of this.-mjc      Bactrim [Sulfamethoxazole W/Trimethoprim] Rash       Review of systems  A 10-point review of systems was negative    Examination  There were no vitals taken for this visit.  There is no height or weight on file to calculate BMI.    Gen- well, NAD, A+Ox3, normal color  Psych- normal mood    Laboratory  Lab Results   Component Value Date     02/12/2022     04/09/2021    POTASSIUM 3.4 02/12/2022    POTASSIUM 3.9 04/09/2021    CHLORIDE 104 02/12/2022    CHLORIDE 109 04/09/2021    CO2 29 02/12/2022    CO2 27 04/09/2021    BUN 10 02/12/2022    BUN 7 04/09/2021    CR 0.81 02/12/2022    CR 0.68 04/09/2021       Lab Results   Component Value Date    BILITOTAL 0.4 02/12/2022    BILITOTAL 0.7 04/09/2021    ALT 25 02/12/2022    ALT 27 04/09/2021    AST 13 02/12/2022     AST 14 04/09/2021    ALKPHOS 145 02/12/2022    ALKPHOS 115 04/09/2021       Lab Results   Component Value Date    ALBUMIN 3.7 02/12/2022    ALBUMIN 4.1 04/09/2021    PROTTOTAL 7.5 02/12/2022    PROTTOTAL 8.0 04/09/2021        Lab Results   Component Value Date    WBC 7.2 02/12/2022    WBC 6.5 04/09/2021    HGB 14.1 02/12/2022    HGB 14.1 04/09/2021    MCV 86 02/12/2022    MCV 85 04/09/2021     02/12/2022     04/09/2021       Lab Results   Component Value Date    INR 1.26 02/12/2022    INR 1.01 04/09/2021     MELD-Na score: 9 at 2/12/2022 11:13 AM  MELD score: 9 at 2/12/2022 11:13 AM  Calculated from:  Serum Creatinine: 0.81 mg/dL (Using min of 1 mg/dL) at 2/12/2022 11:13 AM  Serum Sodium: 138 mmol/L (Using max of 137 mmol/L) at 2/12/2022 11:13 AM  Total Bilirubin: 0.4 mg/dL (Using min of 1 mg/dL) at 2/12/2022 11:13 AM  INR(ratio): 1.26 at 2/12/2022 11:13 AM  Age: 61 years    Radiology    ASSESSMENT AND PLAN:  Cirrhosis of the liver.  Mr. Aguilar has cirrhosis of the liver.  He is relatively doing well as he does not have any fluid retention.  His last endoscopy was normal.  He had his last ultrasound, which did not show any liver lesions, and plan will be to do his labs and his ultrasound every 6 months.  He will need a repeat colonoscopy.      For all his other medical issues, he will follow with his primary care physician.  We plan a 6-month followup, labs every 6 months, ultrasound every 6 months and a colonoscopy, as his last one was not clean, to be scheduled.    I spent 30 minutes on this encounter of 02/23/2022 in chart reviewing, history taking, and documentation.  Some of the time was also spent in counseling and coordination of care.        Kimberly Ramirez MD  Hepatology  [facility]    Video-Visit Details  Type of service:  Video Visit  Video End Time:9:23 AM.  Originating Location (pt. Location): Home  Distant Location (provider [facility]  [employer]   Platform used for Video Visit: [employer]    Again, thank you for allowing me to participate in the care of your patient.      Sincerely,    Kimberly Ramirez MD

## 2022-02-25 ENCOUNTER — TELEPHONE (OUTPATIENT)
Dept: GASTROENTEROLOGY | Facility: CLINIC | Age: 62
End: 2022-02-25
Payer: MEDICARE

## 2022-03-03 ENCOUNTER — VIRTUAL VISIT (OUTPATIENT)
Dept: PALLIATIVE MEDICINE | Facility: CLINIC | Age: 62
End: 2022-03-03
Payer: MEDICARE

## 2022-03-03 DIAGNOSIS — M79.671 CHRONIC HEEL PAIN, RIGHT: Primary | ICD-10-CM

## 2022-03-03 DIAGNOSIS — G89.29 CHRONIC HEEL PAIN, RIGHT: Primary | ICD-10-CM

## 2022-03-03 PROCEDURE — 96158 HLTH BHV IVNTJ INDIV 1ST 30: CPT | Mod: 95 | Performed by: PSYCHOLOGIST

## 2022-03-03 PROCEDURE — 96159 HLTH BHV IVNTJ INDIV EA ADDL: CPT | Mod: 95 | Performed by: PSYCHOLOGIST

## 2022-03-03 NOTE — PROGRESS NOTES
"Erwin Aguilar is a 61 year old male who is being evaluated via a billable video visit.      The patient has been notified of following:     \"This video visit will be conducted via a call between you and your physician/provider. We have found that certain health care needs can be provided without the need for an in-person physical exam.  This service lets us provide the care you need with a video conversation.     Video visits are billed at different rates depending on your insurance coverage.  Please reach out to your insurance provider with any questions.    If during the course of the call the physician/provider feels a video visit is not appropriate, you will not be charged for this service.\"    Patient has given verbal consent for Video visit? Yes    Patient would like the video invitation sent by: Text to cell phone: .[phone]}    Video Start Time: 8:00 AM    Additional provider notes:      Pain Diagnoses per pain provider:   Chronic heel pain, right        DATA: During today's visit you reported the following: Your heel pain is much improved over the past few days since being able to purchase 'flower' from medical cannabis dispensary. Your mood is improved - feeling more hopeful with improved pain. Your activity level is unchanged. Your stress level is manageable. Your sleep is improved the past few days with improved pain control. You reported engaging in self-care for your pain 2-3 times daily.    You identified that you would like to focus on the following or had questions regarding the following issues or concerns, and we discussed the following:   - oriented to follow up  - psychoeducation on sympathetic nervous system response to pain  - having electric bike serviced, looking forward to biking when weather is warmer  - frustrated with not being able to engage in ADLS as you'd like  - not feeling the same level of stress you were in the fall, feel overall things are more manageable  - would like to be able to " find way to bring dog in bike in a carrier    ASSESSMENT: Oriented to follow up - presents as open to exploring new ways to manage his pain.    PLAN:   Your next appointment is scheduled for 3/17 at 2:00 PM.  Assignment/Objectives /interventions for next session:   - continue to focus on self-care    We believe regular attendance is key to your success in our program!      Any time you are unable to keep your appointment we ask that you call us at [phone] at least 24 hours in advance to cancel.This will allow us to offer the appointment time to another patient.     Multiple missed appointments may lead to dismissal from the clinic.    Video-Visit Details    Type of service:  Video Visit    Video End Time (time video stopped): 8:56 AM    Originating Location (pt. Location): Home    Distant Location (provider location):  [facility]     Mode of Communication:  Video Conference via Iemr Faye PsyD LP  Licensed Psychologist  Outpatient Clinic Therapist  [facility]

## 2022-03-09 ENCOUNTER — MYC MEDICAL ADVICE (OUTPATIENT)
Dept: FAMILY MEDICINE | Facility: CLINIC | Age: 62
End: 2022-03-09
Payer: MEDICARE

## 2022-03-09 ASSESSMENT — ASTHMA QUESTIONNAIRES: ACT_TOTALSCORE: 23

## 2022-03-09 NOTE — PROGRESS NOTES
Erwin is a 61 year old who is being evaluated via a billable video visit.      How would you like to obtain your AVS? MyChart  If the video visit is dropped, the invitation should be resent by: Text to cell phone: [phone]   Will anyone else be joining your video visit? 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 Knows due for eye exam when able. Has follow up dr. pacheco (gi ) this summer, 8 years sober!     Follow up October for physical and can repeat diabetes labs in 3-4 months with gi labs.     I will do medical marijuana re-cert for April 2022  Type 2 diabetes mellitus with diabetic polyneuropathy, with long-term current use of insulin (H)      Alcoholism in remission (H)  Continues.     Major depressive disorder, recurrent episode, mild (H)  Controlled.     Paroxysmal atrial fibrillation (H)      Chronic pain of right ankle      Calcific Achilles tendinitis      Acquired calcaneus deformity of right ankle                     Return in about 7 months (around 10/18/2022).    Joel Daniel Wegener, MD  [facility]   Erwin is a 61 year old who presents for the following health issues     History of Present Illness       Reason for visit:  Certification  Symptom onset:  More than a month  Symptoms include:  Pain nausea spasms lack of appetite  Symptom intensity:  Severe  Symptom progression:  Staying the same  Had these symptoms before:  Yes  What makes it worse:  Activity  What makes it better:  Medical cannabis and rest    : Incomplete.He consumes 0 (Incomplete) sweetened beverage(s) daily.        Medical Cannabis Recertification    Pain History:  When did you first notice your pain? - More than 6 weeks   Have you seen this  provider for your pain in the past?   Yes   Where in your body do you have pain? Right leg and back  Are you seeing anyone else for your pain? Yes - Pain clinic    PHQ-9 SCORE 4/27/2021 9/27/2021 11/18/2021   PHQ-9 Total Score - - -   PHQ-9 Total Score MyChart 9 (Mild depression) 8 (Mild depression) 2 (Minimal depression)   PHQ-9 Total Score 9 8 2       JATIN-7 SCORE 4/27/2021 9/27/2021 11/18/2021   Total Score - - -   Total Score 8 (mild anxiety) 17 (severe anxiety) 2 (minimal anxiety)   Total Score 8 17 2           PEG Score 3/10/2022   PEG Total Score 8.67       Chronic Pain Follow Up:    Foot    Failed achilles repair, now chronic foot/ankle pain.  Medical marijuana has essentially solved this.  Much much happier than last we discussed!     PDMP Review       Value Time User    State PDMP site checked  Yes 1/21/2022  1:50 PM Vicky Montgomery, APRN CNP        Last CSA Agreement:   CSA -- Patient Level:    CSA: None found at the patient level.       Last UDS: 7/11/2015        PEG Pain Assessment 3/10/2022   What number best describes your pain on average in the past week? 8   What number best describes how, during the past week, pain has interfered with your enjoyment of life? 9   What number best describes how, during the past week, pain has interfered with your general activity? 9   PEG Total Score 8.67         Review of Systems         Objective           Vitals:  No vitals were obtained today due to virtual visit.    Physical Exam   GENERAL: Healthy, alert and no distress  EYES: Eyes grossly normal to inspection.  No discharge or erythema, or obvious scleral/conjunctival abnormalities.  RESP: No audible wheeze, cough, or visible cyanosis.  No visible retractions or increased work of breathing.    SKIN: Visible skin clear. No significant rash, abnormal pigmentation or lesions.  NEURO: Cranial nerves grossly intact.  Mentation and speech appropriate for age.  PSYCH: Mentation appears normal, affect  normal/bright, judgement and insight intact, normal speech and appearance well-groomed.                Video-Visit Details    Type of service:  Video Visit    Video End Time:11:10    Originating Location (pt. Location): Home    Distant Location (provider location):  [facility]     Platform used for Video Visit: Metrilo

## 2022-03-10 ENCOUNTER — VIRTUAL VISIT (OUTPATIENT)
Dept: FAMILY MEDICINE | Facility: CLINIC | Age: 62
End: 2022-03-10
Payer: MEDICARE

## 2022-03-10 DIAGNOSIS — I48.0 PAROXYSMAL ATRIAL FIBRILLATION (H): ICD-10-CM

## 2022-03-10 DIAGNOSIS — M65.28 CALCIFIC ACHILLES TENDINITIS: ICD-10-CM

## 2022-03-10 DIAGNOSIS — F10.21 ALCOHOLISM IN REMISSION (H): ICD-10-CM

## 2022-03-10 DIAGNOSIS — M25.571 CHRONIC PAIN OF RIGHT ANKLE: ICD-10-CM

## 2022-03-10 DIAGNOSIS — E11.42 TYPE 2 DIABETES MELLITUS WITH DIABETIC POLYNEUROPATHY, WITH LONG-TERM CURRENT USE OF INSULIN (H): Primary | ICD-10-CM

## 2022-03-10 DIAGNOSIS — Z79.4 TYPE 2 DIABETES MELLITUS WITH DIABETIC POLYNEUROPATHY, WITH LONG-TERM CURRENT USE OF INSULIN (H): Primary | ICD-10-CM

## 2022-03-10 DIAGNOSIS — M21.6X1 ACQUIRED CALCANEUS DEFORMITY OF RIGHT ANKLE: ICD-10-CM

## 2022-03-10 DIAGNOSIS — F33.0 MAJOR DEPRESSIVE DISORDER, RECURRENT EPISODE, MILD (H): ICD-10-CM

## 2022-03-10 DIAGNOSIS — G89.29 CHRONIC PAIN OF RIGHT ANKLE: ICD-10-CM

## 2022-03-10 PROCEDURE — 99214 OFFICE O/P EST MOD 30 MIN: CPT | Mod: 95 | Performed by: FAMILY MEDICINE

## 2022-03-10 NOTE — PATIENT INSTRUCTIONS
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 Knows due for eye exam when able. Has follow up dr. pacheco (gi ) this summer, 8 years sober!     Follow up October for physical and can repeat diabetes labs in 3-4 months with gi labs.

## 2022-03-14 ENCOUNTER — MYC MEDICAL ADVICE (OUTPATIENT)
Dept: FAMILY MEDICINE | Facility: CLINIC | Age: 62
End: 2022-03-14
Payer: MEDICARE

## 2022-03-17 ENCOUNTER — MYC MEDICAL ADVICE (OUTPATIENT)
Dept: FAMILY MEDICINE | Facility: CLINIC | Age: 62
End: 2022-03-17

## 2022-03-17 ENCOUNTER — VIRTUAL VISIT (OUTPATIENT)
Dept: PALLIATIVE MEDICINE | Facility: CLINIC | Age: 62
End: 2022-03-17
Payer: MEDICARE

## 2022-03-17 DIAGNOSIS — M79.671 CHRONIC HEEL PAIN, RIGHT: Primary | ICD-10-CM

## 2022-03-17 DIAGNOSIS — G89.29 CHRONIC HEEL PAIN, RIGHT: Primary | ICD-10-CM

## 2022-03-17 PROCEDURE — 96158 HLTH BHV IVNTJ INDIV 1ST 30: CPT | Mod: 95 | Performed by: PSYCHOLOGIST

## 2022-03-17 NOTE — PROGRESS NOTES
"Erwin Aguilar is a 61 year old male who is being evaluated via a billable video visit.      The patient has been notified of following:     \"This video visit will be conducted via a call between you and your physician/provider. We have found that certain health care needs can be provided without the need for an in-person physical exam.  This service lets us provide the care you need with a video conversation.     Video visits are billed at different rates depending on your insurance coverage.  Please reach out to your insurance provider with any questions.    If during the course of the call the physician/provider feels a video visit is not appropriate, you will not be charged for this service.\"    Patient has given verbal consent for Video visit? Yes    Patient would like the video invitation sent by: Text to cell phone: .[phone]}    Video Start Time: 2:00 PM    Additional provider notes:      Pain Diagnoses per pain provider:   Chronic heel pain, right            DATA: During today's visit you reported the following: Your heel pain is improved - cannabis is working. Your mood is stable. Your activity level is stable. Your stress level is manageable. Your sleep is improved. You reported engaging in self-care for your pain 2-3 times daily.    You identified that you would like to focus on the following or had questions regarding the following issues or concerns, and we discussed the following:   - had dentures re-adjusted  - feeling better about idea of joining other residents at meals once dentures are completely refitted  - able to get bike out  - scared by heart issues last fall - feel better with this under control  - 'don't want to drop idea of going forward with life' - further clarification you mean pain and health issues will get worse, feel eventually amputation will happen but will make it easier to walk again but know this is a very long time from occurring because you've been told your foot is still " 'viable'  - radical acceptance of current situation    ASSESSMENT: Pain and mood are fairly stable since starting 'flower' version of medical cannabis.    PLAN:   Your next appointment is scheduled for 4/14 at 9:00 AM.  Assignment/Objectives /interventions for next session:   - continue to engage in self-care  - get out on your bike as you're able weather depending    We believe regular attendance is key to your success in our program!      Any time you are unable to keep your appointment we ask that you call us at [phone] at least 24 hours in advance to cancel.This will allow us to offer the appointment time to another patient.     Multiple missed appointments may lead to dismissal from the clinic.    Video-Visit Details    Type of service:  Video Visit    Video End Time (time video stopped): 1:22 PM    Originating Location (pt. Location): Home    Distant Location (provider location):  [facility]     Mode of Communication:  Video Conference via Imer Faye PsyD LP  Licensed Psychologist  Outpatient Clinic Therapist  [facility]

## 2022-03-21 DIAGNOSIS — K31.84 GASTROPARESIS: ICD-10-CM

## 2022-03-21 DIAGNOSIS — R11.0 NAUSEA WITHOUT VOMITING: ICD-10-CM

## 2022-03-22 RX ORDER — PROMETHAZINE HYDROCHLORIDE 25 MG/1
TABLET ORAL
Qty: 90 TABLET | Refills: 2 | Status: SHIPPED | OUTPATIENT
Start: 2022-03-22 | End: 2022-06-13

## 2022-03-28 ENCOUNTER — PATIENT OUTREACH (OUTPATIENT)
Dept: CARE COORDINATION | Facility: CLINIC | Age: 62
End: 2022-03-28
Payer: MEDICARE

## 2022-03-28 NOTE — PROGRESS NOTES
Clinic Care Coordination Contact    Assessment: Care Coordinator contacted patient for 2 month follow up.  Patient has continued to follow the plan of care and assessment is negative for any new needs or concerns.    Enrollment status: Graduated.      Plan: No further outreaches at this time.  Patient will continue to follow the plan of care.  If new needs arise a new Care Coordination referral may be placed.  FYI to PCP    Karolyn Parra [facility] Care Coordination  Tel: [phone]

## 2022-03-30 ENCOUNTER — MYC MEDICAL ADVICE (OUTPATIENT)
Dept: FAMILY MEDICINE | Facility: CLINIC | Age: 62
End: 2022-03-30
Payer: MEDICARE

## 2022-03-30 DIAGNOSIS — L85.3 XEROSIS CUTIS: ICD-10-CM

## 2022-03-31 RX ORDER — AMMONIUM LACTATE 12 G/100G
CREAM TOPICAL 2 TIMES DAILY PRN
Qty: 385 G | Refills: 3 | Status: SHIPPED | OUTPATIENT
Start: 2022-03-31 | End: 2023-01-02

## 2022-03-31 NOTE — TELEPHONE ENCOUNTER
Routing refill request to provider for review/approval because:  Last Rx from outside provider  Randi BARRIOS RN

## 2022-04-07 ENCOUNTER — MYC MEDICAL ADVICE (OUTPATIENT)
Dept: FAMILY MEDICINE | Facility: CLINIC | Age: 62
End: 2022-04-07
Payer: MEDICARE

## 2022-04-14 ENCOUNTER — VIRTUAL VISIT (OUTPATIENT)
Dept: PALLIATIVE MEDICINE | Facility: CLINIC | Age: 62
End: 2022-04-14
Payer: MEDICARE

## 2022-04-14 DIAGNOSIS — G89.29 CHRONIC HEEL PAIN, RIGHT: Primary | ICD-10-CM

## 2022-04-14 DIAGNOSIS — M79.671 CHRONIC HEEL PAIN, RIGHT: Primary | ICD-10-CM

## 2022-04-14 PROCEDURE — 96158 HLTH BHV IVNTJ INDIV 1ST 30: CPT | Mod: 95 | Performed by: PSYCHOLOGIST

## 2022-04-14 NOTE — PROGRESS NOTES
"Erwin Aguilar is a 61 year old male who is being evaluated via a billable video visit.      The patient has been notified of following:     \"This video visit will be conducted via a call between you and your physician/provider. We have found that certain health care needs can be provided without the need for an in-person physical exam.  This service lets us provide the care you need with a video conversation.     Video visits are billed at different rates depending on your insurance coverage.  Please reach out to your insurance provider with any questions.    If during the course of the call the physician/provider feels a video visit is not appropriate, you will not be charged for this service.\"    Patient has given verbal consent for Video visit? Yes    Patient would like the video invitation sent by: Text to cell phone: .[phone]}    Video Start Time: 9:00 AM    Additional provider notes:      Pain Diagnoses per pain provider:   Chronic heel pain, right            DATA: During today's visit you reported the following: Your heel pain is 'alright'. Your mood is stable. Your activity level is mildly increased - riding bike regularly as weather permits. Your stress level is mildly increased with issues in senior living facility but generally not a major stressor; more stressful to have to get medical cannabis on weekly basis rather than monthly as you'd prefer. Your sleep is still improved in general. Awakening with startle response, no recollection of dreams. You reported engaging in self-care for your pain 2-3 times daily.    You identified that you would like to focus on the following or had questions regarding the following issues or concerns, and we discussed the following:   - still finding benefit with medical cannabis in 'flower' form - dispensary is running out  - using motorized bike regularly  -  new dentures tomorrow  - fire doors in building are not handicap accessible, other issues related to " accessibility in general    ASSESSMENT: Pain and mood remain fairly stable. Discussed after next appointment we may drop down to as needed appointments if things remain stable.    PLAN:   Your next appointment is scheduled for 5/23 at 8:00 AM.  Assignment/Objectives /interventions for next session:   - continue to focus on self-care  - continue to get out on bike as often as you're able    We believe regular attendance is key to your success in our program!      Any time you are unable to keep your appointment we ask that you call us at [phone] at least 24 hours in advance to cancel.This will allow us to offer the appointment time to another patient.     Multiple missed appointments may lead to dismissal from the clinic.    Video-Visit Details    Type of service:  Video Visit    Video End Time (time video stopped): 9:28 AM      Originating Location (pt. Location): Home    Distant Location (provider location):  [facility]     Mode of Communication:  Video Conference via Imer Faye PsyD LP  Licensed Psychologist  Outpatient Clinic Therapist  [facility]

## 2022-04-22 ENCOUNTER — MYC MEDICAL ADVICE (OUTPATIENT)
Dept: FAMILY MEDICINE | Facility: CLINIC | Age: 62
End: 2022-04-22
Payer: MEDICARE

## 2022-04-22 DIAGNOSIS — Z98.890 H/O FOOT SURGERY: Primary | ICD-10-CM

## 2022-04-22 DIAGNOSIS — M65.28 CALCIFIC ACHILLES TENDINITIS: ICD-10-CM

## 2022-04-22 DIAGNOSIS — M62.830 BACK MUSCLE SPASM: ICD-10-CM

## 2022-04-22 DIAGNOSIS — M62.838 MUSCLE SPASMS OF BOTH LOWER EXTREMITIES: ICD-10-CM

## 2022-04-22 RX ORDER — BACLOFEN 10 MG/1
TABLET ORAL
Qty: 180 TABLET | Refills: 3 | Status: SHIPPED | OUTPATIENT
Start: 2022-04-22 | End: 2022-09-20

## 2022-04-22 NOTE — TELEPHONE ENCOUNTER
Routing refill request to provider for review/approval because:  Drug not on the [facility] refill protocol   Randi BARRIOS RN

## 2022-04-27 ENCOUNTER — TELEPHONE (OUTPATIENT)
Dept: GASTROENTEROLOGY | Facility: CLINIC | Age: 62
End: 2022-04-27
Payer: MEDICARE

## 2022-04-27 NOTE — TELEPHONE ENCOUNTER
Screening Questions    BlueKIND OF PREP RedLOCATION [review exclusion criteria] GreenSEDATION TYPE      1. Are you active on mychart? Y    2. What insurance is in the chart? MEDICARE      3.   Ordering/Referring Provider: Kimberly Ramirez MD     4. BMI   (If greater than 40 review exclusion criteria [PAC APPT IF [MAC] @ [facility])  28.6  [If yes, BMI OVER 40-EXTENDED PREP]      **(Sedation review/consideration needed)**  Do you have a legal guardian or Medical Power of    and/or are you able to give consent for your medical care?     SELF     5. Have you had a positive covid test in the last 90 days?   N     6.  Are you currently on dialysis?   N [ If yes, G-PREP & HOSPITAL setting ONLY]     7.  Do you have chronic kidney disease?  N [ If yes, G-PREP ]    8.   Do you have a diagnosis of diabetes?   Y - DIABETIC/INSULINDEPENDENT    [ If yes, G-PREP ]    9.  On a regular basis do you go 3-5 days between bowel movements?   N   [ If yes, EXTENDED PREP]    10.  Are you taking any prescription pain medications on a routine schedule?    N  [ If yes, EXTENDED PREP] [If yes, MAC]      11.   Do you have any chemical dependencies such as alcohol, street drugs, or methadone?    N [If yes, MAC]    12.   Do you have any history of post-traumatic stress syndrome, severe anxiety or history of psychosis?    N  [If yes, MAC]    13.  [FEMALES] Are you currently pregnant?     If yes, how many weeks?       Respiratory/Heart Screening:  [If yes to any of the following HOSPITAL setting only]     14. Do you have Pulmonary Hypertension [Lungs]?   N       15. Do you have UNCONTROLLED asthma?   N     16.  Do you use daily home oxygen?  N      17. Do you have mod to severe Obstructive Sleep Apnea?         (OKAY @ [facility]  [facility]  SH  PH  RI  MG - if pt is not on OXYGEN)  N      18.   Have you had a heart or lung transplant?   N      19.   Have you had a stroke or Transient ischemic attack (TIA - aka  mini stroke ) within 6  months?  (If yes, please review exclusion criteria)  N     20.   In the past 6 months, have you had any heart related issues including cardiomyopathy or heart attack?   N           If yes, did it require cardiac stenting or other implantable device?   N      21.   Do you have any implantable devices in your body (pacemaker, defib, LVAD)? (If yes, please review exclusion criteria)  N     22. Do you take nitroglycerin?   N           If yes, how often? N  (if yes, HOSPITAL setting ONLY)    23.  Are you currently taking any blood thinners?    [If yes, INFORM patient to follw up w/ ORDERING PROVIDER FOR BRIDGING INSTRUCTIONS]     Y - BLOOD THINNER     24.   Do you transfer independently?                (If NO, please HOSPITAL setting ONLY)  Y    25.   Preferred LOCAL Pharmacy for Pre Prescription:      [facility] - [place], [place] - [address]    Scheduling Details  (Please ask for phone number if not scheduled by patient)      Caller : Erwin Aguilar    Date of Procedure: NEEDS COLONOSCOPY   Additional comments:     [READY TO SCHEDULE]     ALL SCREENING COMPLETE.     PT WILL CALL BACK WHEN HE HAS A DESIGNATED .     NEEDS:  PROC  COVID  GOLYTELY PREP/DIABETIC     NH

## 2022-05-06 ENCOUNTER — OFFICE VISIT (OUTPATIENT)
Dept: PODIATRY | Facility: CLINIC | Age: 62
End: 2022-05-06
Payer: MEDICARE

## 2022-05-06 VITALS
SYSTOLIC BLOOD PRESSURE: 150 MMHG | DIASTOLIC BLOOD PRESSURE: 78 MMHG | BODY MASS INDEX: 27.86 KG/M2 | WEIGHT: 199 LBS | HEIGHT: 71 IN

## 2022-05-06 DIAGNOSIS — G89.29 OTHER CHRONIC PAIN: ICD-10-CM

## 2022-05-06 DIAGNOSIS — M67.88 ACHILLES TENDINOSIS OF RIGHT LOWER EXTREMITY: Primary | ICD-10-CM

## 2022-05-06 PROCEDURE — 99204 OFFICE O/P NEW MOD 45 MIN: CPT | Performed by: PODIATRIST

## 2022-05-06 NOTE — LETTER
5/6/2022         RE: Erwin Aguilar  [address]  [place] [address]        Dear Colleague,    Thank you for referring your patient, Erwin Aguilar, to the [facility]. Please see a copy of my visit note below.    Assessment:      ICD-10-CM    1. Achilles tendinosis of right lower extremity  M67.88    2. Other chronic pain  G89.29         Plan:  No orders of the defined types were placed in this encounter.        Discussed the etiology and treatment of the condition with the patient.  Imaging studies reviewed and discussed with the patient.  Discussed surgical and conservative options.    Chronic pain in R foot/ankle after R Achilles insertional debridement.  No structural reason for this, Achilles is intact and skin is healed.  Encouraged PT to progress WB, ROM.  Pre-existing R LE weakness from Cauda Equina symdrome per pt; this is not new since Achilles surgery    Pain is main symptom remaining.  Encouraged repeat Pain Management consult      Return:  No follow-ups on file.    Obdulia Arzate DPM                Chief Complaint:     Patient presents with:  Foot Problems     right heel pain    HPI:  Erwin Aguilar is a 61 year old year old male who presents for evaluation of heel pain.    Pain location - R heel    He states he has so much pain, can't bear weight on the R foot/ankle  He has little motion on this side - but per pt this was his baseline before Achilles Sx due to prior spine surgery.  He has not really attempted to bear weight on the foot/ankle due to pain.  States he was on prior nerve stimulator that helped the weakness on his R side; he has been unable to use since Achilles Sx due to it producing increasing pain in this area.  CRPS was suggested as diagnosis  Has seen multiple pain management docs per pt    He had prolonged skin incision healing & wound VAC; this is now fully healed    He is on knee scooter today      Hemoglobin A1C POCT   Date Value Ref Range  Status   04/09/2021 6.0 (H) 0 - 5.6 % Final     Comment:     Normal <5.7% Prediabetes 5.7-6.4%  Diabetes 6.5% or higher - adopted from ADA   consensus guidelines.     07/20/2020 5.8 (H) 0 - 5.6 % Final     Comment:     Normal <5.7% Prediabetes 5.7-6.4%  Diabetes 6.5% or higher - adopted from ADA   consensus guidelines.     10/08/2019 6.4 (H) 0 - 5.6 % Final     Comment:     Normal <5.7% Prediabetes 5.7-6.4%  Diabetes 6.5% or higher - adopted from ADA   consensus guidelines.     02/01/2018 5.6 4.3 - 6.0 % Final     Hemoglobin A1C   Date Value Ref Range Status   02/12/2022 5.9 (H) 0.0 - 5.6 % Final     Comment:     Normal <5.7%   Prediabetes 5.7-6.4%    Diabetes 6.5% or higher     Note: Adopted from ADA consensus guidelines.   10/04/2021 5.6 0.0 - 5.6 % Final     Comment:     Normal <5.7%   Prediabetes 5.7-6.4%    Diabetes 6.5% or higher     Note: Adopted from ADA consensus guidelines.             Past Medical & Surgical History:  Past Medical History:   Diagnosis Date     Actinic keratosis     aldara     Anxiety      Cancer (H)     squamous cell skin CA     Cauda equina spinal cord injury (H)      Chronic sinusitis 5-1-16     Depressive disorder      Diastasis recti      Esophageal reflux      Esophageal varices in cirrhosis (H) 4/1/2014    Hospitalized for UGI blee 3/28/14, endoscopy revealed bleeding varices.     Essential hypertension, benign      Intermittent asthma      Mild depression (H)      Mixed hyperlipidemia      Nasal polyps 5-1-16     Osteoarthritis of lumbar spine, unspecified spinal osteoarthritis complication status      Other chronic pain      Paroxysmal atrial fibrillation (H) 9/22/2021     SCCA (squamous cell carcinoma) of skin      Seasonal allergic conjunctivitis      Type II or unspecified type diabetes mellitus without mention of complication, not stated as uncontrolled      Unspecified site of spinal cord injury without evidence of spinal bone injury     due to back surgery      Past  Surgical History:   Procedure Laterality Date     ABDOMEN SURGERY  2014     BACK SURGERY  August 2009     COLONOSCOPY N/A 5/12/2016    Procedure: COMBINED COLONOSCOPY, SINGLE OR MULTIPLE BIOPSY/POLYPECTOMY BY BIOPSY;  Surgeon: Ana Paula Urbina MD;  Location:  GI     ENDOSCOPY UPPER, COLONOSCOPY, COMBINED  10/19/2011    Procedure:COMBINED ENDOSCOPY UPPER, COLONOSCOPY; Upper Endoscopy, Colonoscopy with Polypectomy x4; Surgeon:AMBAR RODRÍGUEZ; Location:[facility]     ENT SURGERY  1-2016    Ongoing since then     ESOPHAGOSCOPY, GASTROSCOPY, DUODENOSCOPY (EGD), COMBINED  3/28/2014    Procedure: COMBINED ESOPHAGOSCOPY, GASTROSCOPY, DUODENOSCOPY (EGD);  EGD, Gastric Biopsies, Esophageal Banding;  Surgeon: Reyna Tovar MD;  [facility]     ESOPHAGOSCOPY, GASTROSCOPY, DUODENOSCOPY (EGD), COMBINED  6/9/2014    Procedure: COMBINED ESOPHAGOSCOPY, GASTROSCOPY, DUODENOSCOPY (EGD);  Surgeon: Curtis Mendez MD;  Location:  GI     ESOPHAGOSCOPY, GASTROSCOPY, DUODENOSCOPY (EGD), COMBINED  7/24/2014    Procedure: COMBINED ESOPHAGOSCOPY, GASTROSCOPY, DUODENOSCOPY (EGD);  Surgeon: Gerard Samaniego MD;  Location:  OR     ESOPHAGOSCOPY, GASTROSCOPY, DUODENOSCOPY (EGD), COMBINED N/A 10/31/2014    Procedure: COMBINED ESOPHAGOSCOPY, GASTROSCOPY, DUODENOSCOPY (EGD);  Surgeon: Gerard Samaniego MD;  [facility]     ESOPHAGOSCOPY, GASTROSCOPY, DUODENOSCOPY (EGD), COMBINED N/A 5/12/2016    Procedure: COMBINED ESOPHAGOSCOPY, GASTROSCOPY, DUODENOSCOPY (EGD);  Surgeon: Ana Paula Urbina MD;  [facility]     ESOPHAGOSCOPY, GASTROSCOPY, DUODENOSCOPY (EGD), COMBINED N/A 8/2/2018    Procedure: COMBINED ESOPHAGOSCOPY, GASTROSCOPY, DUODENOSCOPY (EGD);  EGD;  Surgeon: Yu Wagner MD;  [facility]     HCL SQUAMOUS CELL CARCINOMA AG  10/07    left forearm     HERNIORRHAPHY UMBILICAL  11/8/2012    Procedure: HERNIORRHAPHY UMBILICAL;  Open Umbilical Hernia Repair With Mesh ;   Surgeon: Chase Nicholson MD;  [facility]     INSERT STIMULATOR DORSAL COLUMN N/A 6/28/2018    Procedure: INSERT STIMULATOR DORSAL COLUMN;;  Surgeon: Elvis Sauceda MD;  [facility]     neuro stimulator  2010     REMOVE GENERATOR STIMULATOR (LOCATION) N/A 6/28/2018    Procedure: REMOVE GENERATOR STIMULATOR (LOCATION);  Spinal Cord Stimulator and IPG Explant and Re-Implant of SCS System (Leads and IPG);  Surgeon: Elvis Sauceda MD;  [facility]     REPAIR TENDON ACHILLES Right 11/11/2020    Procedure: Right achilles debridement and partial calcaneus excision;  Surgeon: Eh Sandoval MD;  [place] [facility] [place]     SURGICAL HISTORY OF -   1/02    abcess tooth     SURGICAL HISTORY OF -   1999    L4-5 laminectomy, cauda equina syndrome     SURGICAL HISTORY OF -   +    herniated disk repair     TONSILLECTOMY  10 1964     TRANSPOSITION ULNAR NERVE (ELBOW)      right     ZZC APPENDECTOMY  1974      Family History   Problem Relation Age of Onset     Cancer Mother         lung     Breast Cancer Mother      Other Cancer Mother      Cancer Father         esophogeal, GERD     Snoring Father      Diabetes Brother         amputation, Type 1     Diabetes Brother      Diabetes Brother      Cancer - colorectal No family hx of         Social History:  ?  History   Smoking Status     Former Smoker     Packs/day: 0.00     Years: 1.00     Types: Cigarettes     Start date: 10/13/1983     Quit date: 6/9/1984   Smokeless Tobacco     Never Used     History   Drug Use     Frequency: 2.0 times per week     Types: Marijuana     Comment: medical marijuana - occasional     Social History    Substance and Sexual Activity      Alcohol use: No        Alcohol/week: 0.0 standard drinks        Comment: a pint of alcohol / day - last drink was 3/28/14      Allergies:  ?   Allergies   Allergen Reactions     Levaquin Anaphylaxis and Rash     Swelling in lip and tongue felt thick     Lisinopril  "Anaphylaxis     Swollen tongue; inability to swallow; drooling; hives; swollen face, neck, angioedema     Acetaminophen      Hx of cirrhosis      Amlodipine      Swelling, hives, possible angioedema       Morphine      b/p dropped and arms went numb  Hypotension     Quinolones Hives     Spironolactone Unknown     Pt believes himself to be allergic to it; [place] find [place] [place]      Bactrim [Sulfamethoxazole W/Trimethoprim] Rash        Medications:    Current Outpatient Medications   Medication     ACE/ARB NOT PRESCRIBED, INTENTIONAL,     albuterol (PROAIR HFA/PROVENTIL HFA/VENTOLIN HFA) 108 (90 Base) MCG/ACT inhaler     ammonium lactate (LAC-HYDRIN) 12 % external cream     apixaban ANTICOAGULANT (ELIQUIS) 5 MG tablet     baclofen (LIORESAL) 10 MG tablet     blood glucose (ACCU-CHEK KARISHMA PLUS) test strip     clindamycin (CLEOCIN) 150 MG capsule     GLOBAL EASE INJECT PEN NEEDLES 32G X 4 MM miscellaneous     insulin glargine (BASAGLAR KWIKPEN) 100 UNIT/ML pen     medical cannabis (Patient's own supply)     potassium chloride ER (KLOR-CON M) 20 MEQ CR tablet     promethazine (PHENERGAN) 25 MG tablet     propranolol (INDERAL) 40 MG tablet     rosuvastatin (CRESTOR) 20 MG tablet     sertraline (ZOLOFT) 100 MG tablet     triamterene-HCTZ (DYAZIDE) 37.5-25 MG capsule     No current facility-administered medications for this visit.       Physical Exam:  ?  Vitals:  BP (!) 150/78   Ht 1.803 m (5' 11\")   Wt 90.3 kg (199 lb)   BMI 27.75 kg/m     General:  WD/WN, in NAD.  A&O x3.  Dermatologic:    Skin is intact, open lesions absent.   Skin texture, turgor is abnormal - venous stasis R > L.  Posterior incision healed w/ central tissue defect.  Vascular:  Pulses palpable right.  Skin temperature is cool R.  Generalized edema- trace bilateral.  Focal edema- mild F&A right.  Neurologic:    Gross sensation abnormal, decreased R LE.  Gait and balance abnormal, weak R.  Musculoskeletal:  Pain to palpation entire " R foot/ankle.  Achilles tendon continuity intact b/l.  Achilles resting tension is present, but decreased R vs L  Wagner test negative with calf squeeze b/l  Manual Muscle Testing of foot/ankle 3/5  Right, 5/5 L.                Again, thank you for allowing me to participate in the care of your patient.        Sincerely,        Obdulia Arzate DPM

## 2022-05-06 NOTE — PROGRESS NOTES
Assessment:      ICD-10-CM    1. Achilles tendinosis of right lower extremity  M67.88    2. Other chronic pain  G89.29         Plan:  No orders of the defined types were placed in this encounter.        Discussed the etiology and treatment of the condition with the patient.  Imaging studies reviewed and discussed with the patient.  Discussed surgical and conservative options.    Chronic pain in R foot/ankle after R Achilles insertional debridement.  No structural reason for this, Achilles is intact and skin is healed.  Encouraged PT to progress WB, ROM.  Pre-existing R LE weakness from Cauda Equina symdrome per pt; this is not new since Achilles surgery    Pain is main symptom remaining.  Encouraged repeat Pain Management consult      Return:  No follow-ups on file.    Obdulia Arzate DPM                Chief Complaint:     Patient presents with:  Foot Problems     right heel pain    HPI:  Erwin Aguilar is a 61 year old year old male who presents for evaluation of heel pain.    Pain location - R heel    He states he has so much pain, can't bear weight on the R foot/ankle  He has little motion on this side - but per pt this was his baseline before Achilles Sx due to prior spine surgery.  He has not really attempted to bear weight on the foot/ankle due to pain.  States he was on prior nerve stimulator that helped the weakness on his R side; he has been unable to use since Achilles Sx due to it producing increasing pain in this area.  CRPS was suggested as diagnosis  Has seen multiple pain management docs per pt    He had prolonged skin incision healing & wound VAC; this is now fully healed    He is on knee scooter today      Hemoglobin A1C POCT   Date Value Ref Range Status   04/09/2021 6.0 (H) 0 - 5.6 % Final     Comment:     Normal <5.7% Prediabetes 5.7-6.4%  Diabetes 6.5% or higher - adopted from ADA   consensus guidelines.     07/20/2020 5.8 (H) 0 - 5.6 % Final     Comment:     Normal <5.7% Prediabetes  5.7-6.4%  Diabetes 6.5% or higher - adopted from ADA   consensus guidelines.     10/08/2019 6.4 (H) 0 - 5.6 % Final     Comment:     Normal <5.7% Prediabetes 5.7-6.4%  Diabetes 6.5% or higher - adopted from ADA   consensus guidelines.     02/01/2018 5.6 4.3 - 6.0 % Final     Hemoglobin A1C   Date Value Ref Range Status   02/12/2022 5.9 (H) 0.0 - 5.6 % Final     Comment:     Normal <5.7%   Prediabetes 5.7-6.4%    Diabetes 6.5% or higher     Note: Adopted from ADA consensus guidelines.   10/04/2021 5.6 0.0 - 5.6 % Final     Comment:     Normal <5.7%   Prediabetes 5.7-6.4%    Diabetes 6.5% or higher     Note: Adopted from ADA consensus guidelines.             Past Medical & Surgical History:  Past Medical History:   Diagnosis Date     Actinic keratosis     aldara     Anxiety      Cancer (H)     squamous cell skin CA     Cauda equina spinal cord injury (H)      Chronic sinusitis 5-1-16     Depressive disorder      Diastasis recti      Esophageal reflux      Esophageal varices in cirrhosis (H) 4/1/2014    Hospitalized for UGI blee 3/28/14, endoscopy revealed bleeding varices.     Essential hypertension, benign      Intermittent asthma      Mild depression (H)      Mixed hyperlipidemia      Nasal polyps 5-1-16     Osteoarthritis of lumbar spine, unspecified spinal osteoarthritis complication status      Other chronic pain      Paroxysmal atrial fibrillation (H) 9/22/2021     SCCA (squamous cell carcinoma) of skin      Seasonal allergic conjunctivitis      Type II or unspecified type diabetes mellitus without mention of complication, not stated as uncontrolled      Unspecified site of spinal cord injury without evidence of spinal bone injury     due to back surgery      Past Surgical History:   Procedure Laterality Date     ABDOMEN SURGERY  2014     BACK SURGERY  August 2009     COLONOSCOPY N/A 5/12/2016    Procedure: COMBINED COLONOSCOPY, SINGLE OR MULTIPLE BIOPSY/POLYPECTOMY BY BIOPSY;  Surgeon: Ana Paula Urbina  MD Amy;  Location:  GI     ENDOSCOPY UPPER, COLONOSCOPY, COMBINED  10/19/2011    Procedure:COMBINED ENDOSCOPY UPPER, COLONOSCOPY; Upper Endoscopy, Colonoscopy with Polypectomy x4; Surgeon:AMBAR RODRÍGUEZ; Location: OR     ENT SURGERY  1-2016    Ongoing since then     ESOPHAGOSCOPY, GASTROSCOPY, DUODENOSCOPY (EGD), COMBINED  3/28/2014    Procedure: COMBINED ESOPHAGOSCOPY, GASTROSCOPY, DUODENOSCOPY (EGD);  EGD, Gastric Biopsies, Esophageal Banding;  Surgeon: Reyna Tovar MD;  Location:  OR     ESOPHAGOSCOPY, GASTROSCOPY, DUODENOSCOPY (EGD), COMBINED  6/9/2014    Procedure: COMBINED ESOPHAGOSCOPY, GASTROSCOPY, DUODENOSCOPY (EGD);  Surgeon: Curtis Mendez MD;  Location:  GI     ESOPHAGOSCOPY, GASTROSCOPY, DUODENOSCOPY (EGD), COMBINED  7/24/2014    Procedure: COMBINED ESOPHAGOSCOPY, GASTROSCOPY, DUODENOSCOPY (EGD);  Surgeon: Gerard Samaniego MD;  Location:  OR     ESOPHAGOSCOPY, GASTROSCOPY, DUODENOSCOPY (EGD), COMBINED N/A 10/31/2014    Procedure: COMBINED ESOPHAGOSCOPY, GASTROSCOPY, DUODENOSCOPY (EGD);  Surgeon: Gerard Samaniego MD;  Location:  OR     ESOPHAGOSCOPY, GASTROSCOPY, DUODENOSCOPY (EGD), COMBINED N/A 5/12/2016    Procedure: COMBINED ESOPHAGOSCOPY, GASTROSCOPY, DUODENOSCOPY (EGD);  Surgeon: Ana Paula Urbina MD;  Location:  GI     ESOPHAGOSCOPY, GASTROSCOPY, DUODENOSCOPY (EGD), COMBINED N/A 8/2/2018    Procedure: COMBINED ESOPHAGOSCOPY, GASTROSCOPY, DUODENOSCOPY (EGD);  EGD;  Surgeon: Yu Wagner MD;  Location:  GI     HCL SQUAMOUS CELL CARCINOMA AG  10/07    left forearm     HERNIORRHAPHY UMBILICAL  11/8/2012    Procedure: HERNIORRHAPHY UMBILICAL;  Open Umbilical Hernia Repair With Mesh ;  Surgeon: Chase Nicholson MD;  Location:  OR     INSERT STIMULATOR DORSAL COLUMN N/A 6/28/2018    Procedure: INSERT STIMULATOR DORSAL COLUMN;;  Surgeon: Elvis Sauceda MD;  [facility]     neuro stimulator  2010      REMOVE GENERATOR STIMULATOR (LOCATION) N/A 6/28/2018    Procedure: REMOVE GENERATOR STIMULATOR (LOCATION);  Spinal Cord Stimulator and IPG Explant and Re-Implant of SCS System (Leads and IPG);  Surgeon: Elvis Sauceda MD;  [facility]     REPAIR TENDON ACHILLES Right 11/11/2020    Procedure: Right achilles debridement and partial calcaneus excision;  Surgeon: Eh Sandoval MD;  [facility]     SURGICAL HISTORY OF -   1/02    abcess tooth     SURGICAL HISTORY OF -   1999    L4-5 laminectomy, cauda equina syndrome     SURGICAL HISTORY OF -   +    herniated disk repair     TONSILLECTOMY  10 1964     TRANSPOSITION ULNAR NERVE (ELBOW)      right     ZZC APPENDECTOMY  1974      Family History   Problem Relation Age of Onset     Cancer Mother         lung     Breast Cancer Mother      Other Cancer Mother      Cancer Father         esophogeal, GERD     Snoring Father      Diabetes Brother         amputation, Type 1     Diabetes Brother      Diabetes Brother      Cancer - colorectal No family hx of         Social History:  ?  History   Smoking Status     Former Smoker     Packs/day: 0.00     Years: 1.00     Types: Cigarettes     Start date: 10/13/1983     Quit date: 6/9/1984   Smokeless Tobacco     Never Used     History   Drug Use     Frequency: 2.0 times per week     Types: Marijuana     Comment: medical marijuana - occasional     Social History    Substance and Sexual Activity      Alcohol use: No        Alcohol/week: 0.0 standard drinks        Comment: a pint of alcohol / day - last drink was 3/28/14      Allergies:  ?   Allergies   Allergen Reactions     Levaquin Anaphylaxis and Rash     Swelling in lip and tongue felt thick     Lisinopril Anaphylaxis     Swollen tongue; inability to swallow; drooling; hives; swollen face, neck, angioedema     Acetaminophen      Hx of cirrhosis      Amlodipine      Swelling, hives, possible angioedema       Morphine      b/p dropped and arms went  "numb  Hypotension     Quinolones Hives     Spironolactone Unknown     Pt believes himself to be allergic to it; cannot find his old records of this[facility] [place]      Bactrim [Sulfamethoxazole W/Trimethoprim] Rash        Medications:    Current Outpatient Medications   Medication     ACE/ARB NOT PRESCRIBED, INTENTIONAL,     albuterol (PROAIR HFA/PROVENTIL HFA/VENTOLIN HFA) 108 (90 Base) MCG/ACT inhaler     ammonium lactate (LAC-HYDRIN) 12 % external cream     apixaban ANTICOAGULANT (ELIQUIS) 5 MG tablet     baclofen (LIORESAL) 10 MG tablet     blood glucose (ACCU-CHEK KARISHMA PLUS) test strip     clindamycin (CLEOCIN) 150 MG capsule     GLOBAL EASE INJECT PEN NEEDLES 32G X 4 MM miscellaneous     insulin glargine (BASAGLAR KWIKPEN) 100 UNIT/ML pen     medical cannabis (Patient's own supply)     potassium chloride ER (KLOR-CON M) 20 MEQ CR tablet     promethazine (PHENERGAN) 25 MG tablet     propranolol (INDERAL) 40 MG tablet     rosuvastatin (CRESTOR) 20 MG tablet     sertraline (ZOLOFT) 100 MG tablet     triamterene-HCTZ (DYAZIDE) 37.5-25 MG capsule     No current facility-administered medications for this visit.       Physical Exam:  ?  Vitals:  BP (!) 150/78   Ht 1.803 m (5' 11\")   Wt 90.3 kg (199 lb)   BMI 27.75 kg/m     General:  WD/WN, in NAD.  A&O x3.  Dermatologic:    Skin is intact, open lesions absent.   Skin texture, turgor is abnormal - venous stasis R > L.  Posterior incision healed w/ central tissue defect.  Vascular:  Pulses palpable right.  Skin temperature is cool R.  Generalized edema- trace bilateral.  Focal edema- mild F&A right.  Neurologic:    Gross sensation abnormal, decreased R LE.  Gait and balance abnormal, weak R.  Musculoskeletal:  Pain to palpation entire R foot/ankle.  Achilles tendon continuity intact b/l.  Achilles resting tension is present, but decreased R vs L  Wagner test negative with calf squeeze b/l  Manual Muscle Testing of foot/ankle 3/5  Right, 5/5 L.            "

## 2022-05-06 NOTE — PATIENT INSTRUCTIONS
PATIENT INSTRUCTIONS - Podiatry / Foot & Ankle Surgery    Achilles itself is intact, incision is healed (with a defect)    Physical Therapy referral.  Can be home health PT if they will cover it.    Pain management  referral recommended again

## 2022-05-12 DIAGNOSIS — E78.5 HYPERLIPIDEMIA LDL GOAL <100: ICD-10-CM

## 2022-05-12 RX ORDER — ROSUVASTATIN CALCIUM 20 MG/1
20 TABLET, COATED ORAL DAILY
Qty: 30 TABLET | Refills: 5 | Status: SHIPPED | OUTPATIENT
Start: 2022-05-12 | End: 2023-01-26

## 2022-05-12 NOTE — TELEPHONE ENCOUNTER
Prescription approved per [facility] Refill Protocol.  Due for physical in October.    Isela Villar RN

## 2022-05-18 ENCOUNTER — MYC MEDICAL ADVICE (OUTPATIENT)
Dept: FAMILY MEDICINE | Facility: CLINIC | Age: 62
End: 2022-05-18
Payer: MEDICARE

## 2022-05-18 DIAGNOSIS — G56.22 ULNAR NEURITIS, LEFT: Primary | ICD-10-CM

## 2022-05-23 ENCOUNTER — VIRTUAL VISIT (OUTPATIENT)
Dept: PALLIATIVE MEDICINE | Facility: CLINIC | Age: 62
End: 2022-05-23
Payer: MEDICARE

## 2022-05-23 DIAGNOSIS — G89.29 CHRONIC HEEL PAIN, RIGHT: Primary | ICD-10-CM

## 2022-05-23 DIAGNOSIS — M79.671 CHRONIC HEEL PAIN, RIGHT: Primary | ICD-10-CM

## 2022-05-23 PROCEDURE — 96159 HLTH BHV IVNTJ INDIV EA ADDL: CPT | Mod: 95 | Performed by: PSYCHOLOGIST

## 2022-05-23 PROCEDURE — 96158 HLTH BHV IVNTJ INDIV 1ST 30: CPT | Mod: 95 | Performed by: PSYCHOLOGIST

## 2022-05-23 NOTE — PROGRESS NOTES
"Erwin Aguilar is a 61 year old male who is being evaluated via a billable video visit.      The patient has been notified of following:     \"This video visit will be conducted via a call between you and your physician/provider. We have found that certain health care needs can be provided without the need for an in-person physical exam.  This service lets us provide the care you need with a video conversation.     Video visits are billed at different rates depending on your insurance coverage.  Please reach out to your insurance provider with any questions.    If during the course of the call the physician/provider feels a video visit is not appropriate, you will not be charged for this service.\"    Patient has given verbal consent for Video visit? Yes    Patient would like the video invitation sent by: Text to cell phone: .[phone]}    Video Start Time: 8:00 AM    Additional provider notes:      Pain Diagnoses per pain provider:   Chronic heel pain, right                DATA: During today's visit you reported the following: Your heel pain is unchanged. Your mood is stable despite stress. Your activity level is stable - riding bike as weather permits. Your stress level is worse as below. Your sleep is mildly improved - no longer having startle response and waking up but not in pain. You reported engaging in self-care for your pain 2-3 times daily.    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 Stress level is increased significantly, " however he seems to be managing this well and it is not impacting his sleep, pain or mood.    PLAN:   Your next appointment is scheduled for 6/21 at 9:00 AM.  Assignment/Objectives /interventions for next session:   - continue to focus on self-care  - ride bike as often as you're able    We believe regular attendance is key to your success in our program!      Any time you are unable to keep your appointment we ask that you call us at [phone] at least 24 hours in advance to cancel.This will allow us to offer the appointment time to another patient.     Multiple missed appointments may lead to dismissal from the clinic.    Video-Visit Details    Type of service:  Video Visit    Video End Time (time video stopped): 8:44 AM    Originating Location (pt. Location): Home    Distant Location (provider location):  [facility]     Mode of Communication:  Video Conference via Imer Faye PsyD LP  Licensed Psychologist  Outpatient Clinic Therapist  [facility]

## 2022-05-23 NOTE — TELEPHONE ENCOUNTER
DIAGNOSIS: left ulnar neuritis vs impingement/ Wegener, Joel Daniel Irwin, MD in [facility]/ no imaging, no prior surgery   APPOINTMENT DATE: 6.1.22   NOTES STATUS DETAILS   OFFICE NOTE from referring provider Internal 5.18.22 [facility] Medical Advice   OPERATIVE REPORT Internal R elbow 1983   MEDICATION LIST Internal

## 2022-06-01 ENCOUNTER — OFFICE VISIT (OUTPATIENT)
Dept: ORTHOPEDICS | Facility: CLINIC | Age: 62
End: 2022-06-01
Payer: MEDICARE

## 2022-06-01 ENCOUNTER — PRE VISIT (OUTPATIENT)
Dept: ORTHOPEDICS | Facility: CLINIC | Age: 62
End: 2022-06-01
Payer: MEDICARE

## 2022-06-01 VITALS — WEIGHT: 199 LBS | HEIGHT: 71 IN | BODY MASS INDEX: 27.86 KG/M2

## 2022-06-01 DIAGNOSIS — G56.22 ULNAR NEURITIS, LEFT: ICD-10-CM

## 2022-06-01 PROCEDURE — 99203 OFFICE O/P NEW LOW 30 MIN: CPT | Performed by: FAMILY MEDICINE

## 2022-06-01 NOTE — LETTER
6/1/2022    RE: Erwin Aguilar  [address]  [place] [address]     Dear Colleague,    Thank you for referring your patient, Erwin Aguilar, to the [facility] [place]. Please see a copy of my visit note below.    Today, the patient reports that he noticed his left arm pain for 8-9 months. Symptoms have worsened over time. He has a history of right sided ulnar surgery ~40 years ago and reports his left sided symptoms are the same as the right prior to surgery. He has noticed hand weakness, dropping his iPad and unable to turn the key on his electric bike. He has not had an EMG for the left sided symptoms. He is not currently taking any medication for the pain. He has not attempted hand therapy. He has a theragun that he has tried using. He plays guitar and is having difficulties with this due to his symptoms. He is right hand dominant.   He uses a left sided cane.    He is on Eliquis.        PMH:  Past Medical History:   Diagnosis Date     Actinic keratosis     aldara     Anxiety      Cancer (H)     squamous cell skin CA     Cauda equina spinal cord injury (H)      Chronic sinusitis 5-1-16     Depressive disorder      Diastasis recti      Esophageal reflux      Esophageal varices in cirrhosis (H) 4/1/2014    Hospitalized for UGI blee 3/28/14, endoscopy revealed bleeding varices.     Essential hypertension, benign      Intermittent asthma      Mild depression (H)      Mixed hyperlipidemia      Nasal polyps 5-1-16     Osteoarthritis of lumbar spine, unspecified spinal osteoarthritis complication status      Other chronic pain      Paroxysmal atrial fibrillation (H) 9/22/2021     SCCA (squamous cell carcinoma) of skin      Seasonal allergic conjunctivitis      Type II or unspecified type diabetes mellitus without mention of complication, not stated as uncontrolled      Unspecified site of spinal cord injury without evidence of spinal bone injury     due to back surgery        Active problem list:  Patient Active Problem List   Diagnosis     Generalized anxiety disorder     Type 2 diabetes, HbA1c goal < 7% (H)     Hyperlipidemia LDL goal <100     Hypertension goal BP (blood pressure) < 140/90     Major depressive disorder, recurrent episode, mild (H)     Chronic pain syndrome     GERD (gastroesophageal reflux disease)     Abnormal liver function tests     Health Care Home     Abnormal EMG     Hernia     Diastasis recti     Hypokalemia     Wound infection     Cellulitis     Nausea without vomiting     Atopic dermatitis     Muscle spasms of Upper extremity     Edema of left lower extremity     Cirrhosis of liver (H)     Anemia due to blood loss, acute     Skin infection     Superficial thrombophlebitis of arm     Cellulitis of hand     Ganglion cyst     Dry skin dermatitis     Moderate persistent asthma     Open wound of right heel     Fall     Closed fracture of right patella     Right knee pain     Alcoholic cirrhosis of liver without ascites (H)     Lumbosacral radiculopathy     Calculus of gallbladder without cholecystitis without obstruction     Type 2 diabetes mellitus without complication, with long-term current use of insulin (H)     Wound, open, buttock, right     Type 2 diabetes mellitus with diabetic polyneuropathy, with long-term current use of insulin (H)     Benign neoplasm of skin of trunk, except scrotum     Hematemesis with nausea     Osteoarthritis of lumbar spine, unspecified spinal osteoarthritis complication status     S/P insertion of spinal cord stimulator     Gastroparesis     Alcoholism in remission (H)     Right ankle pain     Acquired calcaneus deformity of right ankle     H/O foot surgery     Calcific Achilles tendinitis     Paroxysmal atrial fibrillation (H)     Adjustment disorder with anxious mood     Psychophysiological insomnia     At risk for prolonged QT interval syndrome       FH:  Family History   Problem Relation Age of Onset     Cancer Mother          lung     Breast Cancer Mother      Other Cancer Mother      Cancer Father         esophogeal, GERD     Snoring Father      Diabetes Brother         amputation, Type 1     Diabetes Brother      Diabetes Brother      Cancer - colorectal No family hx of        SH:  Social History     Socioeconomic History     Marital status: Single     Spouse name: Not on file     Number of children: 0     Years of education: Not on file     Highest education level: Not on file   Occupational History     Occupation: sales     Employer: UNEMPLOYED   Tobacco Use     Smoking status: Former Smoker     Packs/day: 0.00     Years: 1.00     Pack years: 0.00     Types: Cigarettes     Start date: 10/13/1983     Quit date: 1984     Years since quittin.0     Smokeless tobacco: Never Used   Substance and Sexual Activity     Alcohol use: No     Alcohol/week: 0.0 standard drinks     Comment: a pint of alcohol / day - last drink was 3/28/14     Drug use: Yes     Frequency: 2.0 times per week     Types: Marijuana     Comment: medical marijuana - occasional     Sexual activity: Not Currently     Partners: Female     Birth control/protection: Abstinence   Other Topics Concern     Parent/sibling w/ CABG, MI or angioplasty before 65F 55M? No   Social History Narrative     Not on file     Social Determinants of Health     Financial Resource Strain: Not on file   Food Insecurity: Not on file   Transportation Needs: No Transportation Needs     Lack of Transportation (Medical): No     Lack of Transportation (Non-Medical): No   Physical Activity: Not on file   Stress: Not on file   Social Connections: Not on file   Intimate Partner Violence: Not on file   Housing Stability: Not on file       MEDS:  See EMR, reviewed  ALL:  See EMR, reviewed    REVIEW OF SYSTEMS:  CONSTITUTIONAL:NEGATIVE for fever, chills, change in weight  INTEGUMENTARY/SKIN: NEGATIVE for worrisome rashes, moles or lesions  EYES: NEGATIVE for vision changes or  irritation  ENT/MOUTH: NEGATIVE for ear, mouth and throat problems  RESP:NEGATIVE for significant cough or SOB  BREAST: NEGATIVE for masses, tenderness or discharge  CV: NEGATIVE for chest pain, palpitations or peripheral edema  GI: NEGATIVE for nausea, abdominal pain, heartburn, or change in bowel habits  :NEGATIVE for frequency, dysuria, or hematuria  :NEGATIVE for frequency, dysuria, or hematuria  NEURO: NEGATIVE for weakness, dizziness or paresthesias  ENDOCRINE: NEGATIVE for temperature intolerance, skin/hair changes  HEME/ALLERGY/IMMUNE: NEGATIVE for bleeding problems  PSYCHIATRIC: NEGATIVE for changes in mood or affect        Objective: He is tender in the ulnar groove on the left.  He describes numbness and tingling this been consistent in the fourth and fifth fingers of the left hand.  Digit he minimi strength is normal.  I do not see atrophy in the first intertriginous space.  Claw hand and thumb strength is normal.  Full range of motion at the elbow.  No effusion at the elbow.  Appropriate in conversation affect.    We reviewed x-rays of the elbow that show no significant intra-articular DJD.  Chronic calcification near the olecranon.  No bony abnormality.    Assessment left-sided ulnar neuritis, chronic.  History of previous right-sided ulnar nerve transposition surgery    Plan: EMG of the left upper extremity is pending.  EMG clinic indicated that Eliquis does not have to be stopped to do the procedure.  Referral to hand therapy for a night splint and some nerve glide exercises.  Referral to hand surgery regarding chronic left ulnar neuritis.    Again, thank you for allowing me to participate in the care of your patient.      Sincerely,    Kurtis Washington MD

## 2022-06-01 NOTE — PROGRESS NOTES
Today, the patient reports that he noticed his left arm pain for 8-9 months. Symptoms have worsened over time. He has a history of right sided ulnar surgery ~40 years ago and reports his left sided symptoms are the same as the right prior to surgery. He has noticed hand weakness, dropping his iPad and unable to turn the key on his electric bike. He has not had an EMG for the left sided symptoms. He is not currently taking any medication for the pain. He has not attempted hand therapy. He has a theragun that he has tried using. He plays guitar and is having difficulties with this due to his symptoms. He is right hand dominant.   He uses a left sided cane.    He is on Eliquis.        PMH:  Past Medical History:   Diagnosis Date     Actinic keratosis     aldara     Anxiety      Cancer (H)     squamous cell skin CA     Cauda equina spinal cord injury (H)      Chronic sinusitis 5-1-16     Depressive disorder      Diastasis recti      Esophageal reflux      Esophageal varices in cirrhosis (H) 4/1/2014    Hospitalized for UGI blee 3/28/14, endoscopy revealed bleeding varices.     Essential hypertension, benign      Intermittent asthma      Mild depression (H)      Mixed hyperlipidemia      Nasal polyps 5-1-16     Osteoarthritis of lumbar spine, unspecified spinal osteoarthritis complication status      Other chronic pain      Paroxysmal atrial fibrillation (H) 9/22/2021     SCCA (squamous cell carcinoma) of skin      Seasonal allergic conjunctivitis      Type II or unspecified type diabetes mellitus without mention of complication, not stated as uncontrolled      Unspecified site of spinal cord injury without evidence of spinal bone injury     due to back surgery       Active problem list:  Patient Active Problem List   Diagnosis     Generalized anxiety disorder     Type 2 diabetes, HbA1c goal < 7% (H)     Hyperlipidemia LDL goal <100     Hypertension goal BP (blood pressure) < 140/90     Major depressive disorder,  recurrent episode, mild (H)     Chronic pain syndrome     GERD (gastroesophageal reflux disease)     Abnormal liver function tests     Health Care Home     Abnormal EMG     Hernia     Diastasis recti     Hypokalemia     Wound infection     Cellulitis     Nausea without vomiting     Atopic dermatitis     Muscle spasms of Upper extremity     Edema of left lower extremity     Cirrhosis of liver (H)     Anemia due to blood loss, acute     Skin infection     Superficial thrombophlebitis of arm     Cellulitis of hand     Ganglion cyst     Dry skin dermatitis     Moderate persistent asthma     Open wound of right heel     Fall     Closed fracture of right patella     Right knee pain     Alcoholic cirrhosis of liver without ascites (H)     Lumbosacral radiculopathy     Calculus of gallbladder without cholecystitis without obstruction     Type 2 diabetes mellitus without complication, with long-term current use of insulin (H)     Wound, open, buttock, right     Type 2 diabetes mellitus with diabetic polyneuropathy, with long-term current use of insulin (H)     Benign neoplasm of skin of trunk, except scrotum     Hematemesis with nausea     Osteoarthritis of lumbar spine, unspecified spinal osteoarthritis complication status     S/P insertion of spinal cord stimulator     Gastroparesis     Alcoholism in remission (H)     Right ankle pain     Acquired calcaneus deformity of right ankle     H/O foot surgery     Calcific Achilles tendinitis     Paroxysmal atrial fibrillation (H)     Adjustment disorder with anxious mood     Psychophysiological insomnia     At risk for prolonged QT interval syndrome       FH:  Family History   Problem Relation Age of Onset     Cancer Mother         lung     Breast Cancer Mother      Other Cancer Mother      Cancer Father         esophogeal, GERD     Snoring Father      Diabetes Brother         amputation, Type 1     Diabetes Brother      Diabetes Brother      Cancer - colorectal No family hx of         SH:  Social History     Socioeconomic History     Marital status: Single     Spouse name: Not on file     Number of children: 0     Years of education: Not on file     Highest education level: Not on file   Occupational History     Occupation: sales     Employer: UNEMPLOYED   Tobacco Use     Smoking status: Former Smoker     Packs/day: 0.00     Years: 1.00     Pack years: 0.00     Types: Cigarettes     Start date: 10/13/1983     Quit date: 1984     Years since quittin.0     Smokeless tobacco: Never Used   Substance and Sexual Activity     Alcohol use: No     Alcohol/week: 0.0 standard drinks     Comment: a pint of alcohol / day - last drink was 3/28/14     Drug use: Yes     Frequency: 2.0 times per week     Types: Marijuana     Comment: medical marijuana - occasional     Sexual activity: Not Currently     Partners: Female     Birth control/protection: Abstinence   Other Topics Concern     Parent/sibling w/ CABG, MI or angioplasty before 65F 55M? No   Social History Narrative     Not on file     Social Determinants of Health     Financial Resource Strain: Not on file   Food Insecurity: Not on file   Transportation Needs: No Transportation Needs     Lack of Transportation (Medical): No     Lack of Transportation (Non-Medical): No   Physical Activity: Not on file   Stress: Not on file   Social Connections: Not on file   Intimate Partner Violence: Not on file   Housing Stability: Not on file       MEDS:  See EMR, reviewed  ALL:  See EMR, reviewed    REVIEW OF SYSTEMS:  CONSTITUTIONAL:NEGATIVE for fever, chills, change in weight  INTEGUMENTARY/SKIN: NEGATIVE for worrisome rashes, moles or lesions  EYES: NEGATIVE for vision changes or irritation  ENT/MOUTH: NEGATIVE for ear, mouth and throat problems  RESP:NEGATIVE for significant cough or SOB  BREAST: NEGATIVE for masses, tenderness or discharge  CV: NEGATIVE for chest pain, palpitations or peripheral edema  GI: NEGATIVE for nausea, abdominal pain,  heartburn, or change in bowel habits  :NEGATIVE for frequency, dysuria, or hematuria  :NEGATIVE for frequency, dysuria, or hematuria  NEURO: NEGATIVE for weakness, dizziness or paresthesias  ENDOCRINE: NEGATIVE for temperature intolerance, skin/hair changes  HEME/ALLERGY/IMMUNE: NEGATIVE for bleeding problems  PSYCHIATRIC: NEGATIVE for changes in mood or affect        Objective: He is tender in the ulnar groove on the left.  He describes numbness and tingling this been consistent in the fourth and fifth fingers of the left hand.  Digit he minimi strength is normal.  I do not see atrophy in the first intertriginous space.  Claw hand and thumb strength is normal.  Full range of motion at the elbow.  No effusion at the elbow.  Appropriate in conversation affect.    We reviewed x-rays of the elbow that show no significant intra-articular DJD.  Chronic calcification near the olecranon.  No bony abnormality.    Assessment left-sided ulnar neuritis, chronic.  History of previous right-sided ulnar nerve transposition surgery    Plan: EMG of the left upper extremity is pending.  EMG clinic indicated that Eliquis does not have to be stopped to do the procedure.  Referral to hand therapy for a night splint and some nerve glide exercises.  Referral to hand surgery regarding chronic left ulnar neuritis.

## 2022-06-06 NOTE — PROGRESS NOTES
Hand Therapy Initial Evaluation    Current Date:  6/10/2022    Diagnosis: Left Ulnar Neuritis   DOI: 6/1/22 MD order, has been noticing for the past 8-9 months  MD: Kurtis Washington MD  DOS: NA    Precautions: None stated    Subjective:  Erwin Aguilar is a 61 year old male.    Patient reports symptoms of the left arm from upper arm to digits which occurred due to unknown cause. Since onset symptoms are Gradually getting worse. PMH of R ulnar nerve transposition >20 years prior. Pt also report history of BLE numbness, multiple spine surgeries, cauda equina syndrome, issues with R drop foot and R achilles with prior surgery.     Answers for HPI/ROS submitted by the patient on 6/10/2022  Reason for Visit:: Ulnar nerve entrapment  How problem occurred:: Not sure  Number scale: 8/10  General health as reported by patient: good  Please check all that apply to your current or past medical history: asthma, depression, diabetes, heart problems, high blood pressure, implanted device, numbness/tingling, pain at night/rest, weakness  Medical allergies: other  Other Allergies Detail: See list  Surgeries: orthopedic surgery  Medications you are currently taking: anti-depressants, cardiac medication, high blood pressure medication, muscle relaxants  Occupation:: Retired  What are your primary job tasks: computer work, prolonged sitting, pushing/pulling, repetitive tasks    Occupational Profile Information:  Right hand dominant  Prior functional level:  independent-have help in some areas  Patient reports symptoms of pain, stiffness/loss of motion, weakness/loss of strength, numbness and tingling   Special tests:  x-ray. Plan to obtain EMG.   3 views left elbow radiographs 6/1/2022    Impression:       1. No acute osseous abnormality.   2. Mild degenerative changes.    Previous treatment: None, feels theragun is helpful   Barriers include:lives with wife who requires assistance. Both live in assisted living home. He and his  "wife use wheelchairs.   Mobility: Ambulates with aid of SPC in left side. Some mild balance issues noted.   Transportation: Pt cannot drive d/t health issues, rides electric bike as transport (7 miles to this location per pt report).   Currently not working d/t health reasons   Leisure activities/hobbies: Biking, guitar  Other: Pt bikes as transport. B leg issues/numbness, R achilles tendon issue. R drop foot. Multiple spine surgeries. Cauda equina syndrome. He and wife live in assisted living facility. She is also with health issues.     Functional Outcome Measure:   Upper Extremity Functional Index Score:  SCORE:   Column Totals: /80: (P) 40   (A lower score indicates greater disability.)    Objective:  Pain Level (Scale 0-10):   6/10/2022   At Rest \"Always there\"   With Use 10   Current  8     Pain Description:  Date 6/10/2022   Location L ulnar arm, FA and posterior arm    Pain Quality Aching, Dull, Nagging, Numb, Tender, Throbbing and Tingling   Frequency constant     Pain is worst  daytime or nighttime   Exacerbated by  Pressure, use,    Relieved by rest   Progression Getting worse      Edema  None    Sensation  Decreased Ulnar Nerve distribution per pt report, all the way up to shoulder.     Appearance  Observation:  - none  + mild    ++ moderate    +++ severe    6/10/2022   Clawing of L RF, SF  Mild at times    Hypothenar Atrophy Mild    Intrinsic Atrophy Mild      Special Tests  Pain Report: - none  + mild    ++ moderate    +++ severe    6/10/2022   Tinels at [place] +++   Tinel's at [place] +++   Elbow Flexion Test ++   Ulnar nerve subluxation at elbow -   Froment's Test +     Neural Tension Testing  UNT: Ulnar Neurodynamic Test (based on DS Doris's ULNT)   6/10/2022   0-5 Scale 0 S2   Position:   0/5: Arm across abdomen in coronal plane  1/5: ER to neutral ABD shoulder to 45 degrees  2/5: ER shoulder to 90 degrees  3/5: Block shoulder and ABD shoulder to 110 degrees  4/5: Fully pronate " forearm, extend wrist and ring and small fingers  5/5: Flex elbow and bring hand to side of face  Notes:    (+) indicates beyond grade level but less than FPC to next level    (-) indicates over FPC to level    S1  onset/change of patient's symptoms    S2 definite stop point based on patient's discomfort level    MMT Ulnar Nerve  Scale 0-5/5   6/10/2022   FCU 3+   FDP Ring and Small 2   ADM 2   ODM 3+   FDM 2   Palmar Interossei 3+   Dorsal Interossei 2   Lumbricals  3   Adductor Pollicis 4   FPB 2     Strength   (Measured in pounds)  Pain Report: - none  + mild    ++ moderate    +++ severe    6/10/2022 6/10/2022   Trials R L   1  2  3 80 15+   Average       Lat Pinch 6/10/2022 6/10/2022   Trials R L   1  2  3 23 5   Average       3 Pt Pinch 6/10/2022 6/10/2022   Trials R L   1  2  3 23 4   Average       Palpation  Pain Report: - none  + mild    ++ moderate    +++ severe    3/5/2020   MEP +++   [place]  +++   Digits 4-5 +++   Ulnar FA  +++   Upper, medial arm  +++     Assessment:  Patient presents with symptoms consistent with diagnosis noted above, with conservative intervention.     Patient's limitations or Problem List includes:  Pain, Decreased ROM/motion, Weakness, Sensory disturbance, Hypomobility, Decreased , Decreased pinch, Decreased coordination and Decreased dexterity of the left elbow, wrist and hand which interferes with the patient's ability to perform Self Care Tasks (dressing), Work Tasks, Sleep Patterns, Recreational Activities and Household Chores as compared to previous level of function.    Rehab Potential:  Good - Return to full activity, some limitations    Patient will benefit from skilled Occupational Therapy to increase ROM, flexibility, motion, overall strength,  strength, pinch strength, forearm strength, coordination, dexterity and sensation and decrease pain and edema to return to previous activity level and resume normal daily tasks and to reach their rehab  potential.    Barriers to Learning:  No barrier    Communication Issues:  Patient appears to be able to clearly communicate and understand verbal and written communication and follow directions correctly.    Chart Review: Chart Review and Simple history review with patient    Identified Performance Deficits: dressing, functional mobility, home establishment and management, meal preparation and cleanup, shopping, sleep, work and leisure activities    Assessment of Occupational Performance:  5 or more Performance Deficits    Clinical Decision Making (Complexity): Low complexity    Treatment Explanation:  The following has been discussed with the patient:  RX ordered/plan of care  Anticipated outcomes  Possible risks and side effects    Plan:  Frequency:  1 X week, once daily  Duration:  for 8 weeks    Treatment Plan:    Modalities:    US, Iontophoresis, Fluidotherapy and Paraffin   Therapeutic Exercise:    AROM, AAROM, PROM, Tendon Gliding, Blocking, Extensor Tracking, Isotonics, Isometrics and Stabilization  Neuromuscular re-ed:   Nerve Gliding, Coordination/Dexterity, Sensory re-education, Desensitization, Kinesiotaping, Isometrics and Stabilization  Manual Techniques:   Coordination/Dexterity, Joint mobilization, Friction massage, Myofascial release and Manual edema mobilization  Orthotic Fabrication:    Static, Static progressive and Dynamic  Self Care:    Self Care Tasks, Ergonomic Considerations, Community Transportation and Work Tasks    Discharge Plan:  Achieve all LTG.  Independent in home treatment program.  Reach maximal therapeutic benefit.    Home Exercise Program:  L elbow muff for night use    PTRX Report  The following values have been sent to [employer].  Cubital Tunnel Prevention  EMR Notes  HEP - Sets  Reps  Sessions per day  Notes  Orthosis Wear and Care  EMR Notes  HEP - Sets  Reps  Sessions per day  Notes Wear at night. Renmove if causes added pressure.  Median Nerve Mobility  EMR Notes  HEP  - Sets  Reps 5-10  Sessions per day 2-3  Notes Gentle.  Nerve Flossing Highly Irritable Ulnar Nerve - [place]  EMR Notes  HEP - Sets  Reps  Sessions per day  Notes Ignore head motions.  Shoulder Rolls  EMR Notes  HEP - Sets  Reps 5-10  Sessions per day 2-3  Notes      Next Visit:  Assess HEP  Assess elbow muff  Progress as tolerated

## 2022-06-10 ENCOUNTER — THERAPY VISIT (OUTPATIENT)
Dept: OCCUPATIONAL THERAPY | Facility: CLINIC | Age: 62
End: 2022-06-10
Attending: FAMILY MEDICINE
Payer: MEDICARE

## 2022-06-10 DIAGNOSIS — M79.622 PAIN OF LEFT UPPER ARM: ICD-10-CM

## 2022-06-10 DIAGNOSIS — M79.642 PAIN OF LEFT HAND: ICD-10-CM

## 2022-06-10 DIAGNOSIS — M79.645 PAIN OF FINGER OF LEFT HAND: ICD-10-CM

## 2022-06-10 DIAGNOSIS — R20.2 PARESTHESIAS: ICD-10-CM

## 2022-06-10 DIAGNOSIS — M79.632 PAIN OF LEFT FOREARM: ICD-10-CM

## 2022-06-10 DIAGNOSIS — G56.22 ULNAR NEURITIS, LEFT: ICD-10-CM

## 2022-06-10 DIAGNOSIS — M62.81 GENERALIZED MUSCLE WEAKNESS: Primary | ICD-10-CM

## 2022-06-10 PROCEDURE — 97165 OT EVAL LOW COMPLEX 30 MIN: CPT | Mod: GO | Performed by: OCCUPATIONAL THERAPIST

## 2022-06-10 PROCEDURE — 97110 THERAPEUTIC EXERCISES: CPT | Mod: GO | Performed by: OCCUPATIONAL THERAPIST

## 2022-06-11 PROBLEM — M79.645 PAIN OF FINGER OF LEFT HAND: Status: ACTIVE | Noted: 2022-06-11

## 2022-06-11 PROBLEM — M79.632 PAIN OF LEFT FOREARM: Status: ACTIVE | Noted: 2022-06-11

## 2022-06-11 PROBLEM — R20.2 PARESTHESIAS: Status: ACTIVE | Noted: 2022-06-11

## 2022-06-11 PROBLEM — M79.622 PAIN OF LEFT UPPER ARM: Status: ACTIVE | Noted: 2022-06-11

## 2022-06-11 PROBLEM — M79.642 PAIN OF LEFT HAND: Status: ACTIVE | Noted: 2022-06-11

## 2022-06-11 NOTE — PROGRESS NOTES
[facility]    OUTPATIENT Occupational Therapy ORTHOPEDIC EVALUATION  PLAN OF TREATMENT FOR OUTPATIENT REHABILITATION  (COMPLETE FOR INITIAL CLAIMS ONLY)  Patient's Last Name, First Name, M.I.  YOB: 1960  Erwin Aguilar    Provider s Name:  [facility]   Medical Record No.  [id number]   Start of Care Date:  06/10/22   Onset Date:   06/01/22 (MD order)   Type:     __PT   _x__OT Medical Diagnosis:    Encounter Diagnosis   Name Primary?    Ulnar neuritis, left         Treatment Diagnosis:  Left Ulnar Neuritis        Goals:     06/10/22 0500   Goal #1   Goal #1 household chores   Previous Performance Level Independent   Current Functional Task    Current Performance Level 10/10 pain   STG Target Perfomance Open a tight or new jar   STG Target Perform Level 7/10 pain   Due Date 07/10/22   LTG Target Task/Performance Pain free household chores   Due Date 08/09/22       Therapy Frequency:  1x per week  Predicted Duration of Therapy Intervention:  8 weeks    MATEO GUNDERSON OT                 I CERTIFY THE NEED FOR THESE SERVICES FURNISHED UNDER        THIS PLAN OF TREATMENT AND WHILE UNDER MY CARE     (Physician attestation of this document indicates review and certification of the therapy plan).                     Certification Date From:  06/10/22   Certification Date To:  08/05/22    Referring Provider:  Kurtis Washington    Initial Assessment        See Epic Evaluation SOC Date: 06/10/22

## 2022-06-17 ENCOUNTER — THERAPY VISIT (OUTPATIENT)
Dept: OCCUPATIONAL THERAPY | Facility: CLINIC | Age: 62
End: 2022-06-17
Payer: MEDICARE

## 2022-06-17 DIAGNOSIS — M79.642 PAIN OF LEFT HAND: ICD-10-CM

## 2022-06-17 DIAGNOSIS — M79.622 PAIN OF LEFT UPPER ARM: ICD-10-CM

## 2022-06-17 DIAGNOSIS — R20.2 PARESTHESIAS: ICD-10-CM

## 2022-06-17 DIAGNOSIS — M79.632 PAIN OF LEFT FOREARM: Primary | ICD-10-CM

## 2022-06-17 DIAGNOSIS — M79.645 PAIN OF FINGER OF LEFT HAND: ICD-10-CM

## 2022-06-17 PROCEDURE — 97112 NEUROMUSCULAR REEDUCATION: CPT | Mod: GO | Performed by: OCCUPATIONAL THERAPIST

## 2022-06-21 ENCOUNTER — VIRTUAL VISIT (OUTPATIENT)
Dept: PALLIATIVE MEDICINE | Facility: CLINIC | Age: 62
End: 2022-06-21
Payer: MEDICARE

## 2022-06-21 DIAGNOSIS — G89.29 CHRONIC HEEL PAIN, RIGHT: Primary | ICD-10-CM

## 2022-06-21 DIAGNOSIS — M79.671 CHRONIC HEEL PAIN, RIGHT: Primary | ICD-10-CM

## 2022-06-21 PROCEDURE — 96158 HLTH BHV IVNTJ INDIV 1ST 30: CPT | Mod: 95 | Performed by: PSYCHOLOGIST

## 2022-06-21 NOTE — PROGRESS NOTES
"Erwin Aguilar is a 61 year old male who is being evaluated via a billable video visit.      The patient has been notified of following:     \"This video visit will be conducted via a call between you and your physician/provider. We have found that certain health care needs can be provided without the need for an in-person physical exam.  This service lets us provide the care you need with a video conversation.     Video visits are billed at different rates depending on your insurance coverage.  Please reach out to your insurance provider with any questions.    If during the course of the call the physician/provider feels a video visit is not appropriate, you will not be charged for this service.\"    Patient has given verbal consent for Video visit? Yes    Patient would like the video invitation sent by: Text to cell phone: .[phone]}    Video Start Time: 9:00 AM    Additional provider notes:     Pain Diagnoses per pain provider:   Chronic heel pain, right        DATA: During today's visit you reported the following: Your heel pain is well-managed due to medical cannabis; known triggers of increased physical activity and not paying attention to movement limits. Your mood is mildly improved. Your activity level is increased with nicer weather - getting out on bike more. Your stress level is manageable. Your sleep is improving due to medical cannabis. You reported engaging in self-care for your pain 2-3 times daily.    You identified that you would like to focus on the following or had questions regarding the following issues or concerns, and we discussed the following:   - started hand PT - worry it made it worse  - have further assessments scheduled for EMG, surgeon  - enjoying summer being here finally  - ongoing bleeding from heel surgery wound  - girlfriend doing better    ASSESSMENT: Pain overall is very well managed with current regimen of medical cannabis. As a result, stress is more manageable and mood is improved. "     PLAN:   Your next appointment is scheduled for 8/15 at 8:00 AM.  Assignment/Objectives /interventions for next session:   - continue to pace activity   - continue to focus on self-care    We believe regular attendance is key to your success in our program!      Any time you are unable to keep your appointment we ask that you call us at [phone] at least 24 hours in advance to cancel.This will allow us to offer the appointment time to another patient.     Multiple missed appointments may lead to dismissal from the clinic.    Video-Visit Details    Type of service:  Video Visit    Video End Time (time video stopped): 9:27 AM    Originating Location (pt. Location): Home    Distant Location (provider location):  [facility]     Mode of Communication:  Video Conference via Imer Faye PsyD LP  Licensed Psychologist  Outpatient Clinic Therapist  [facility]

## 2022-06-23 ENCOUNTER — OFFICE VISIT (OUTPATIENT)
Dept: OPHTHALMOLOGY | Facility: CLINIC | Age: 62
End: 2022-06-23
Payer: MEDICARE

## 2022-06-23 DIAGNOSIS — H52.13 MYOPIA OF BOTH EYES WITH ASTIGMATISM AND PRESBYOPIA: ICD-10-CM

## 2022-06-23 DIAGNOSIS — H25.13 NUCLEAR AGE-RELATED CATARACT, BOTH EYES: ICD-10-CM

## 2022-06-23 DIAGNOSIS — Z79.4 TYPE 2 DIABETES MELLITUS WITHOUT COMPLICATION, WITH LONG-TERM CURRENT USE OF INSULIN (H): Primary | ICD-10-CM

## 2022-06-23 DIAGNOSIS — H52.203 MYOPIA OF BOTH EYES WITH ASTIGMATISM AND PRESBYOPIA: ICD-10-CM

## 2022-06-23 DIAGNOSIS — E11.9 TYPE 2 DIABETES MELLITUS WITHOUT COMPLICATION, WITH LONG-TERM CURRENT USE OF INSULIN (H): Primary | ICD-10-CM

## 2022-06-23 DIAGNOSIS — H52.4 MYOPIA OF BOTH EYES WITH ASTIGMATISM AND PRESBYOPIA: ICD-10-CM

## 2022-06-23 PROCEDURE — 92015 DETERMINE REFRACTIVE STATE: CPT | Performed by: OPTOMETRIST

## 2022-06-23 PROCEDURE — 92004 COMPRE OPH EXAM NEW PT 1/>: CPT | Performed by: OPTOMETRIST

## 2022-06-23 ASSESSMENT — EXTERNAL EXAM - RIGHT EYE: OD_EXAM: NORMAL

## 2022-06-23 ASSESSMENT — REFRACTION_MANIFEST
OD_AXIS: 025
OS_AXIS: 155
OS_SPHERE: -1.25
OS_CYLINDER: +0.50
OS_ADD: +2.25
OD_SPHERE: -1.25
OD_ADD: +2.25
OD_CYLINDER: +0.50

## 2022-06-23 ASSESSMENT — CONF VISUAL FIELD
METHOD: COUNTING FINGERS
OD_NORMAL: 1
OS_NORMAL: 1

## 2022-06-23 ASSESSMENT — CUP TO DISC RATIO
OS_RATIO: 0.30
OD_RATIO: 0.30

## 2022-06-23 ASSESSMENT — VISUAL ACUITY
OD_SC+: -1
METHOD: SNELLEN - LINEAR
OD_SC: 20/40
OS_SC: 20/30

## 2022-06-23 ASSESSMENT — TONOMETRY
OD_IOP_MMHG: 10
OS_IOP_MMHG: 10
IOP_METHOD: ICARE

## 2022-06-23 ASSESSMENT — SLIT LAMP EXAM - LIDS
COMMENTS: NORMAL, MILD DERMATOCHALASIS
COMMENTS: NORMAL, MILD DERMATOCHALASIS

## 2022-06-23 ASSESSMENT — EXTERNAL EXAM - LEFT EYE: OS_EXAM: NORMAL

## 2022-06-23 NOTE — NURSING NOTE
Chief Complaints and History of Present Illnesses   Patient presents with     Diabetic Eye Exam     Chief Complaint(s) and History of Present Illness(es)     Diabetic Eye Exam     Associated symptoms: blurred vision (doesn't wear gls for distance or near).  Negative for flashes and floaters    Diabetes Type: Type 2 and on insulin    Blood Sugars: is controlled    Treatments tried: no treatments    Pain scale: 0/10              Comments     Pt notes that he needs new gls RX.   Last BGL: doesn't check; uses 25 mg of insulin daily  Lab Results       Component                Value               Date                       A1C                      5.9                 02/12/2022                 A1C                      5.6                 10/04/2021                 A1C                      6.0                 04/09/2021                 A1C                      5.8                 07/20/2020                 A1C                      6.4                 10/08/2019                 A1C                      6.8                 04/02/2019                 A1C                      7.2                 01/14/2019            Shannon ISRAEL June 23, 2022 9:12 AM

## 2022-06-23 NOTE — PROGRESS NOTES
A/P  1.) Type 2 Diabetes without ophthalmic manifestation OU  -Last A1c 5.9, insulin controlled. Excellent BS control over past few years  -Reviewed effects of DM on the eyes and importance of good blood sugar control  -Monitor annually with dilation    2.) Myopia/Astig/Presbyopia each eye  -Currently without glasses, Rx similar to previous    3.) Cataracts OU  -Mild, not visually significant  -Reviewed findings with pt    Monitor 1 year diabetic eye exam    I have confirmed the patient's CC, HPI and reviewed Past Medical History, Past Surgical History, Social History, Family History, Problem List, Medication List and agree with Tech note.     Autumn Ortiz, BRANDON CARVALHOO FSLS

## 2022-06-24 ENCOUNTER — THERAPY VISIT (OUTPATIENT)
Dept: OCCUPATIONAL THERAPY | Facility: CLINIC | Age: 62
End: 2022-06-24
Payer: MEDICARE

## 2022-06-24 DIAGNOSIS — M79.642 PAIN OF LEFT HAND: ICD-10-CM

## 2022-06-24 DIAGNOSIS — M79.645 PAIN OF FINGER OF LEFT HAND: ICD-10-CM

## 2022-06-24 DIAGNOSIS — M79.632 PAIN OF LEFT FOREARM: Primary | ICD-10-CM

## 2022-06-24 DIAGNOSIS — M79.622 PAIN OF LEFT UPPER ARM: ICD-10-CM

## 2022-06-24 DIAGNOSIS — R20.2 PARESTHESIAS: ICD-10-CM

## 2022-06-24 PROCEDURE — 97112 NEUROMUSCULAR REEDUCATION: CPT | Mod: GO | Performed by: OCCUPATIONAL THERAPIST

## 2022-07-11 ENCOUNTER — THERAPY VISIT (OUTPATIENT)
Dept: OCCUPATIONAL THERAPY | Facility: CLINIC | Age: 62
End: 2022-07-11
Payer: MEDICARE

## 2022-07-11 DIAGNOSIS — M79.632 PAIN OF LEFT FOREARM: Primary | ICD-10-CM

## 2022-07-11 DIAGNOSIS — K70.30 ALCOHOLIC CIRRHOSIS OF LIVER WITHOUT ASCITES (H): Primary | ICD-10-CM

## 2022-07-11 DIAGNOSIS — R20.2 PARESTHESIAS: ICD-10-CM

## 2022-07-11 DIAGNOSIS — M79.622 PAIN OF LEFT UPPER ARM: ICD-10-CM

## 2022-07-11 DIAGNOSIS — M79.645 PAIN OF FINGER OF LEFT HAND: ICD-10-CM

## 2022-07-11 DIAGNOSIS — M79.642 PAIN OF LEFT HAND: ICD-10-CM

## 2022-07-11 PROCEDURE — 97140 MANUAL THERAPY 1/> REGIONS: CPT | Mod: GO | Performed by: OCCUPATIONAL THERAPIST

## 2022-07-11 PROCEDURE — 97112 NEUROMUSCULAR REEDUCATION: CPT | Mod: GO | Performed by: OCCUPATIONAL THERAPIST

## 2022-07-12 DIAGNOSIS — R11.0 NAUSEA WITHOUT VOMITING: ICD-10-CM

## 2022-07-12 DIAGNOSIS — K31.84 GASTROPARESIS: ICD-10-CM

## 2022-07-13 RX ORDER — PROMETHAZINE HYDROCHLORIDE 25 MG/1
TABLET ORAL
Qty: 90 TABLET | Refills: 4 | Status: SHIPPED | OUTPATIENT
Start: 2022-07-13 | End: 2022-11-22

## 2022-07-13 NOTE — TELEPHONE ENCOUNTER
FLORINDA,    Routing refill request to provider for review/approval because:  Drug not on the [facility] refill protocol   VV 3/10/22    Thank you,  Isela Villar RN

## 2022-07-14 ENCOUNTER — MYC MEDICAL ADVICE (OUTPATIENT)
Dept: FAMILY MEDICINE | Facility: CLINIC | Age: 62
End: 2022-07-14

## 2022-07-14 DIAGNOSIS — E11.42 TYPE 2 DIABETES MELLITUS WITH DIABETIC POLYNEUROPATHY, WITH LONG-TERM CURRENT USE OF INSULIN (H): Primary | ICD-10-CM

## 2022-07-14 DIAGNOSIS — Z79.4 TYPE 2 DIABETES MELLITUS WITH DIABETIC POLYNEUROPATHY, WITH LONG-TERM CURRENT USE OF INSULIN (H): Primary | ICD-10-CM

## 2022-07-15 ENCOUNTER — ANCILLARY PROCEDURE (OUTPATIENT)
Dept: GENERAL RADIOLOGY | Facility: CLINIC | Age: 62
End: 2022-07-15
Attending: NURSE PRACTITIONER
Payer: MEDICARE

## 2022-07-15 ENCOUNTER — OFFICE VISIT (OUTPATIENT)
Dept: URGENT CARE | Facility: URGENT CARE | Age: 62
End: 2022-07-15
Payer: MEDICARE

## 2022-07-15 ENCOUNTER — LAB (OUTPATIENT)
Dept: LAB | Facility: CLINIC | Age: 62
End: 2022-07-15
Payer: MEDICARE

## 2022-07-15 VITALS
SYSTOLIC BLOOD PRESSURE: 150 MMHG | HEART RATE: 59 BPM | BODY MASS INDEX: 27.75 KG/M2 | DIASTOLIC BLOOD PRESSURE: 72 MMHG | RESPIRATION RATE: 12 BRPM | OXYGEN SATURATION: 97 % | TEMPERATURE: 98.4 F | WEIGHT: 199 LBS
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
## 2022-07-15 DIAGNOSIS — M25.562 ACUTE PAIN OF LEFT KNEE: ICD-10-CM

## 2022-07-15 DIAGNOSIS — S80.812A LEG ABRASION, INFECTED, LEFT, INITIAL ENCOUNTER: ICD-10-CM

## 2022-07-15 DIAGNOSIS — L08.9 LEG ABRASION, INFECTED, LEFT, INITIAL ENCOUNTER: ICD-10-CM
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
## 2022-07-15 DIAGNOSIS — K70.30 ALCOHOLIC CIRRHOSIS OF LIVER WITHOUT ASCITES (H): ICD-10-CM

## 2022-07-15 DIAGNOSIS — M79.675 PAIN OF TOE OF LEFT FOOT: ICD-10-CM

## 2022-07-15 DIAGNOSIS — L03.116 LEFT LEG CELLULITIS: ICD-10-CM

## 2022-07-15 LAB
ERYTHROCYTE [DISTWIDTH] IN BLOOD BY AUTOMATED COUNT: 12.4 % (ref 10–15)
HCT VFR BLD AUTO: 41.1 % (ref 40–53)
HGB BLD-MCNC: 14.3 G/DL (ref 13.3–17.7)
INR PPP: 1.04 (ref 0.85–1.15)
MCH RBC QN AUTO: 30.8 PG (ref 26.5–33)
MCHC RBC AUTO-ENTMCNC: 34.8 G/DL (ref 31.5–36.5)
MCV RBC AUTO: 88 FL (ref 78–100)
PLATELET # BLD AUTO: 191 10E3/UL (ref 150–450)
RBC # BLD AUTO: 4.65 10E6/UL (ref 4.4–5.9)
WBC # BLD AUTO: 9.3 10E3/UL (ref 4–11)

## 2022-07-15 PROCEDURE — 85610 PROTHROMBIN TIME: CPT

## 2022-07-15 PROCEDURE — 96372 THER/PROPH/DIAG INJ SC/IM: CPT | Performed by: NURSE PRACTITIONER

## 2022-07-15 PROCEDURE — 99215 OFFICE O/P EST HI 40 MIN: CPT | Mod: 25 | Performed by: NURSE PRACTITIONER

## 2022-07-15 PROCEDURE — 36415 COLL VENOUS BLD VENIPUNCTURE: CPT

## 2022-07-15 PROCEDURE — 73562 X-RAY EXAM OF KNEE 3: CPT | Mod: TC | Performed by: RADIOLOGY

## 2022-07-15 PROCEDURE — 85027 COMPLETE CBC AUTOMATED: CPT

## 2022-07-15 PROCEDURE — 82248 BILIRUBIN DIRECT: CPT

## 2022-07-15 PROCEDURE — 80053 COMPREHEN METABOLIC PANEL: CPT

## 2022-07-15 RX ORDER — BLOOD-GLUCOSE METER
KIT MISCELLANEOUS
Qty: 100 STRIP | Refills: 6 | Status: CANCELLED | OUTPATIENT
Start: 2022-07-15

## 2022-07-15 RX ORDER — BLOOD-GLUCOSE CONTROL, NORMAL
EACH MISCELLANEOUS
Qty: 1 EACH | Refills: 1 | Status: SHIPPED | OUTPATIENT
Start: 2022-07-15 | End: 2022-08-16

## 2022-07-15 RX ORDER — LANCETS 28 GAUGE
EACH MISCELLANEOUS
Qty: 180 EACH | Refills: 3 | Status: SHIPPED | OUTPATIENT
Start: 2022-07-15 | End: 2022-08-16

## 2022-07-15 RX ORDER — GLUCOSAMINE HCL/CHONDROITIN SU 500-400 MG
CAPSULE ORAL
Qty: 100 EACH | Refills: 3 | Status: CANCELLED | OUTPATIENT
Start: 2022-07-15

## 2022-07-15 RX ORDER — CEFTRIAXONE SODIUM 1 G
1 VIAL (EA) INJECTION ONCE
Status: COMPLETED | OUTPATIENT
Start: 2022-07-15 | End: 2022-07-15

## 2022-07-15 RX ORDER — BLOOD-GLUCOSE METER
KIT MISCELLANEOUS
Qty: 1 KIT | Refills: 0 | Status: CANCELLED | OUTPATIENT
Start: 2022-07-15

## 2022-07-15 RX ADMIN — Medication 1 G: at 11:55

## 2022-07-15 NOTE — PATIENT INSTRUCTIONS
Change the dressings once a day for the next week.  Do not submerge the leg in any type of water so no swimming or sitting in pools spas or lake water.    After 1 week he may continue to change the dressings daily if needed but would not use antibiotic ointment at that point.  The wound will need to start drying out.    Edgard tape the toes together for comfort.    If symptoms worsen please go to the emergency room immediately.    Start the oral antibiotic tomorrow morning.
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
## 2022-07-16 ENCOUNTER — APPOINTMENT (OUTPATIENT)
Dept: GENERAL RADIOLOGY | Facility: CLINIC | Age: 62
End: 2022-07-16
Attending: EMERGENCY MEDICINE
Payer: MEDICARE

## 2022-07-16 ENCOUNTER — HOSPITAL ENCOUNTER (EMERGENCY)
Facility: CLINIC | Age: 62
Discharge: HOME | End: 2022-07-16
Attending: EMERGENCY MEDICINE | Admitting: EMERGENCY MEDICINE
Payer: MEDICARE

## 2022-07-16 VITALS
OXYGEN SATURATION: 97 % | BODY MASS INDEX: 28.7 KG/M2 | RESPIRATION RATE: 18 BRPM | DIASTOLIC BLOOD PRESSURE: 102 MMHG | HEIGHT: 71 IN | SYSTOLIC BLOOD PRESSURE: 200 MMHG | WEIGHT: 205 LBS | TEMPERATURE: 98.2 F | HEART RATE: 61 BPM

## 2022-07-16 DIAGNOSIS — L03.116 CELLULITIS OF LEFT LOWER EXTREMITY: ICD-10-CM

## 2022-07-16 LAB
ABO/RH(D): NORMAL
ALBUMIN SERPL-MCNC: 4 G/DL (ref 3.4–5)
ALP SERPL-CCNC: 100 U/L (ref 40–150)
ALT SERPL W P-5'-P-CCNC: 23 U/L (ref 0–70)
ANION GAP SERPL CALCULATED.3IONS-SCNC: 12 MMOL/L (ref 7–15)
ANION GAP SERPL CALCULATED.3IONS-SCNC: 8 MMOL/L (ref 3–14)
ANTIBODY SCREEN: NEGATIVE
APTT PPP: 63 SECONDS (ref 22–38)
AST SERPL W P-5'-P-CCNC: 15 U/L (ref 0–45)
BASOPHILS # BLD AUTO: 0.1 10E3/UL (ref 0–0.2)
BASOPHILS NFR BLD AUTO: 1 %
BILIRUB DIRECT SERPL-MCNC: 0.1 MG/DL (ref 0–0.2)
BILIRUB SERPL-MCNC: 0.5 MG/DL (ref 0.2–1.3)
BUN SERPL-MCNC: 11.2 MG/DL (ref 8–23)
BUN SERPL-MCNC: 12 MG/DL (ref 7–30)
CALCIUM SERPL-MCNC: 9.2 MG/DL (ref 8.5–10.1)
CALCIUM SERPL-MCNC: 9.2 MG/DL (ref 8.8–10.2)
CHLORIDE BLD-SCNC: 108 MMOL/L (ref 94–109)
CHLORIDE SERPL-SCNC: 105 MMOL/L (ref 98–107)
CO2 SERPL-SCNC: 24 MMOL/L (ref 20–32)
CREAT SERPL-MCNC: 0.71 MG/DL (ref 0.66–1.25)
CREAT SERPL-MCNC: 0.83 MG/DL (ref 0.67–1.17)
CRP SERPL-MCNC: 18.8 MG/L
DEPRECATED HCO3 PLAS-SCNC: 24 MMOL/L (ref 22–29)
EOSINOPHIL # BLD AUTO: 0.3 10E3/UL (ref 0–0.7)
EOSINOPHIL NFR BLD AUTO: 4 %
ERYTHROCYTE [DISTWIDTH] IN BLOOD BY AUTOMATED COUNT: 12.6 % (ref 10–15)
ERYTHROCYTE [SEDIMENTATION RATE] IN BLOOD BY WESTERGREN METHOD: 15 MM/HR (ref 0–20)
GFR SERPL CREATININE-BSD FRML MDRD: >90 ML/MIN/1.73M2
GFR SERPL CREATININE-BSD FRML MDRD: >90 ML/MIN/1.73M2
GLUCOSE BLD-MCNC: 130 MG/DL (ref 70–99)
GLUCOSE SERPL-MCNC: 99 MG/DL (ref 70–99)
HCT VFR BLD AUTO: 39.4 % (ref 40–53)
HGB BLD-MCNC: 14 G/DL (ref 13.3–17.7)
HOLD SPECIMEN: NORMAL
IMM GRANULOCYTES # BLD: 0 10E3/UL
IMM GRANULOCYTES NFR BLD: 0 %
INR PPP: 1.08 (ref 0.85–1.15)
LACTATE SERPL-SCNC: 1 MMOL/L (ref 0.7–2)
LYMPHOCYTES # BLD AUTO: 2.1 10E3/UL (ref 0.8–5.3)
LYMPHOCYTES NFR BLD AUTO: 24 %
MCH RBC QN AUTO: 31.1 PG (ref 26.5–33)
MCHC RBC AUTO-ENTMCNC: 35.5 G/DL (ref 31.5–36.5)
MCV RBC AUTO: 88 FL (ref 78–100)
MONOCYTES # BLD AUTO: 0.8 10E3/UL (ref 0–1.3)
MONOCYTES NFR BLD AUTO: 9 %
NEUTROPHILS # BLD AUTO: 5.6 10E3/UL (ref 1.6–8.3)
NEUTROPHILS NFR BLD AUTO: 62 %
NRBC # BLD AUTO: 0 10E3/UL
NRBC BLD AUTO-RTO: 0 /100
PLATELET # BLD AUTO: 187 10E3/UL (ref 150–450)
POTASSIUM BLD-SCNC: 3.7 MMOL/L (ref 3.4–5.3)
POTASSIUM SERPL-SCNC: 3.6 MMOL/L (ref 3.4–5.3)
PROT SERPL-MCNC: 7.4 G/DL (ref 6.8–8.8)
RBC # BLD AUTO: 4.5 10E6/UL (ref 4.4–5.9)
SODIUM SERPL-SCNC: 140 MMOL/L (ref 133–144)
SODIUM SERPL-SCNC: 141 MMOL/L (ref 136–145)
SPECIMEN EXPIRATION DATE: NORMAL
WBC # BLD AUTO: 9 10E3/UL (ref 4–11)

## 2022-07-16 PROCEDURE — 85610 PROTHROMBIN TIME: CPT | Performed by: EMERGENCY MEDICINE

## 2022-07-16 PROCEDURE — 85730 THROMBOPLASTIN TIME PARTIAL: CPT | Performed by: EMERGENCY MEDICINE

## 2022-07-16 PROCEDURE — 73560 X-RAY EXAM OF KNEE 1 OR 2: CPT | Mod: LT

## 2022-07-16 PROCEDURE — 86850 RBC ANTIBODY SCREEN: CPT | Performed by: EMERGENCY MEDICINE

## 2022-07-16 PROCEDURE — 85652 RBC SED RATE AUTOMATED: CPT | Performed by: EMERGENCY MEDICINE

## 2022-07-16 PROCEDURE — 99282 EMERGENCY DEPT VISIT SF MDM: CPT | Performed by: EMERGENCY MEDICINE

## 2022-07-16 PROCEDURE — 86140 C-REACTIVE PROTEIN: CPT | Performed by: EMERGENCY MEDICINE

## 2022-07-16 PROCEDURE — 83605 ASSAY OF LACTIC ACID: CPT | Performed by: EMERGENCY MEDICINE

## 2022-07-16 PROCEDURE — 99284 EMERGENCY DEPT VISIT MOD MDM: CPT | Performed by: EMERGENCY MEDICINE

## 2022-07-16 PROCEDURE — 36415 COLL VENOUS BLD VENIPUNCTURE: CPT | Performed by: EMERGENCY MEDICINE

## 2022-07-16 PROCEDURE — 82310 ASSAY OF CALCIUM: CPT | Performed by: EMERGENCY MEDICINE

## 2022-07-16 PROCEDURE — 85014 HEMATOCRIT: CPT | Performed by: EMERGENCY MEDICINE

## 2022-07-16 PROCEDURE — 73560 X-RAY EXAM OF KNEE 1 OR 2: CPT | Mod: 26 | Performed by: RADIOLOGY

## 2022-07-16 ASSESSMENT — ENCOUNTER SYMPTOMS
MYALGIAS: 0
HEADACHES: 1
ARTHRALGIAS: 1
FEVER: 0
CHILLS: 1
COLOR CHANGE: 1
FATIGUE: 0

## 2022-07-16 NOTE — DISCHARGE INSTRUCTIONS
Please make an appointment to follow up with Your Primary Care Provider in 2 days. If you develop any new symptoms or a fever or notice increasing diameter of your cellulitic rash, then please return to the ER immediately.
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
## 2022-07-18 ENCOUNTER — MYC MEDICAL ADVICE (OUTPATIENT)
Dept: OPHTHALMOLOGY | Facility: CLINIC | Age: 62
End: 2022-07-18

## 2022-07-19 NOTE — TELEPHONE ENCOUNTER
Called and made an appointment for Erwni for a tech appointment on 8/2 @ 2 pm for glasses anabela Wylie Communication Facilitator on 7/19/2022 at 4:01 PM

## 2022-07-21 ENCOUNTER — MYC MEDICAL ADVICE (OUTPATIENT)
Dept: FAMILY MEDICINE | Facility: CLINIC | Age: 62
End: 2022-07-21

## 2022-07-21 DIAGNOSIS — L03.116 LEFT LEG CELLULITIS: ICD-10-CM

## 2022-07-21 NOTE — TELEPHONE ENCOUNTER
JW,  Please see below.  Pt called.  I advised Evisit.  Pt requesting message be sent to you instead of Evisit.  Was seen in Ed 7/16.  Pharm pended.  Please advise.   Thanks,  Rama Morataya RN

## 2022-07-26 ENCOUNTER — TELEPHONE (OUTPATIENT)
Dept: NEUROLOGY | Facility: CLINIC | Age: 62
End: 2022-07-26
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
## 2022-07-26 NOTE — TELEPHONE ENCOUNTER
DIAGNOSIS: EMG results/ Consult    APPOINTMENT DATE: 8/1/2022, 9:20 AM    NOTES STATUS DETAILS   OFFICE NOTE from referring provider N/A    OFFICE NOTE from other specialist Internal 7/11/2022, 6/24/2022, 6/17/2022, 6/10/2022 Therapy visit with Yasmin Pat OT ([facility])    6/1/2022 Office visit with Dr. Kurtis Washington ([facility])      MEDICATION LIST Internal    LABS     CBC/DIFF Internal 7/15/2022   XRAYS (IMAGES & REPORTS) PACS XR Elbow Left: 6/1/2022

## 2022-07-26 NOTE — TELEPHONE ENCOUNTER
Spoke with EMG tech who indicated that it would be no concern for the EMG.    Returned call to patient and gave him this information  No further questions.    Holden Michele RN

## 2022-07-27 ENCOUNTER — OFFICE VISIT (OUTPATIENT)
Dept: NEUROLOGY | Facility: CLINIC | Age: 62
End: 2022-07-27
Attending: FAMILY MEDICINE
Payer: MEDICARE

## 2022-07-27 DIAGNOSIS — G56.22 ULNAR NEUROPATHY AT ELBOW OF LEFT UPPER EXTREMITY: Primary | ICD-10-CM

## 2022-07-27 DIAGNOSIS — G56.22 ULNAR NEURITIS, LEFT: ICD-10-CM

## 2022-07-27 PROCEDURE — 95886 MUSC TEST DONE W/N TEST COMP: CPT | Performed by: PSYCHIATRY & NEUROLOGY

## 2022-07-27 PROCEDURE — 95908 NRV CNDJ TST 3-4 STUDIES: CPT | Performed by: PSYCHIATRY & NEUROLOGY

## 2022-07-27 NOTE — PROGRESS NOTES
[facility]  Electrodiagnostic Laboratory                 Department of Neurology                                                                                                         Test Date:  2022    Patient: Erwin Aguilar : 1960 Physician: Roland Bass MD   Sex: Male AGE: 61 year Ref Phys:    ID#: [id number]   Technician: Kristy Behling     History and Examination:  Erwin Aguilar is a 61 year old man with numbness in the left ulnar distribution and a past history of right ulnar transposition, after presenting with similar symptoms in the past. He is referred for evaluation of suspected ulnar neuropathy at the elbow.    Techniques:  Motor conduction studies were done with surface recording electrodes. Sensory conduction studies were performed with surface electrodes, unless indicated otherwise by (n), designating the use of subdermal recording electrodes. Temperature was monitored and recorded throughout the study. Upper extremities were maintained at a temperature of 32 degrees Centigrade or higher.  EMG was done with a concentric needle electrode. Paraspinal electromyography was deferred because the patient is on an anticoagulant and radiculopathy was not suspected clinically.    Results:  Left median and ulnar sensory, mixed nerve, and motor conduction studies demonstrated moderate ulnar motor conduction slowing across the elbow as well as mild slowing of median palmar conduction and marginal relative prolongation of median distal latencies. A left radial sensory conduction study was normal.     Interpretation:  This is an abnormal study, demonstrating electrophysiologic evidence of the followin. Mild to moderate left ulnar neuropathy at the elbow.  2. 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      _____________________________  Roland Bass MD  Board Certified in Clinical Neurophysiology and Neuromuscular Medicine        Nerve Conduction Studies  Motor  Sites      Latency Amplitude Neg. Amp Diff Segment Distance Velocity Neg. Dur Neg Area Diff Temperature Comment   Site (ms) Norm (mV) Norm %  cm m/s Norm ms %  C    Left Median (APB) Motor   Wrist 4.2  < 4.4 5.5  > 5.0  Wrist-APB 8   4.5  32.6    Elbow 9.1 - 4.3 - -21.8 Elbow-Wrist 25 51  > 48 5.2 -10.9 32.6    Left Median/Ulnar (Lumb-Dors Int II) Motor        Median (Lumb I)   Wrist 3.9 - 0.49 -      8.9  34.1         Ulnar (Dorsal Int (manus))   Wrist 3.3 - 1.94 -      5.2  34    Left Ulnar (ADM) Motor   Wrist 2.7  < 3.5 9.0  > 5.0  Wrist-ADM 8   6.6  32.4    Bel Elbow 6.4 - 7.9 - -12.2 Bel Elbow-Wrist 23 62  > 48 6.7 -4.7 32.4    Abv Elbow 9.5 - 6.6 - -16.5 Abv Elbow-Bel Elbow 10 32  > 48 7.2 -5.4 32.4    Erb's Pt. 11.1 - 6.8 - 3.0 Erb's Pt.-Abv Elbow 10 63 - 7.1 -3.1 32.4    Left Ulnar (FDI) Motor   Wrist 3.6 - 9.6 -      4.5  32.3    Bel Elbow 7.8 - 8.1 - -15.6 Bel Elbow-Wrist 20 48  > 48 5.0 -4.4 32.3    Abv Elbow 10.7 - 7.2 - -11.1 Abv Elbow-Bel Elbow 10 34  > 48 5.3 -9.7 32.3    Erb's Pt. 12.0 - 7.0 - -2.8 Erb's Pt.-Abv Elbow 10 77 - 5.7 -1.14 32.3      Sensory Sites      Onset Lat Peak Lat Amp (O-P) Amp (P-P) Segment Distance Velocity Temperature   Site ms ms  V Norm  V  cm m/s Norm  C   Left Median Sensory   Wrist-Dig II 2.9 3.9 24  > 10 67 Wrist-Dig II 14 48  > 48 34.5   Left Median-Ulnar Palmar Sensory        Median   Palm-Wrist 1.95 2.6 37 - 79 Palm-Wrist 8 41 - 33.9        Ulnar   Palm-Wrist 1.25 1.88 16 - 20 Palm-Wrist 8 64 - 33.3   Left Radial Sensory   Forearm-Wrist 1.90 2.5 19  > 15 23 Forearm-Wrist 10 53 - 34.3   Left Ulnar Sensory   Wrist-Dig V 2.4 3.1 17  > 8 12 Wrist-Dig V 12.5 52  > 48 34.2     Inter-Nerve Comparisons     Nerve 1 Value 1 Nerve 2 Value 2 Parameter Result Normal   Sensory Sites   L Median Palm-Wrist 2.6 ms L Ulnar Palm-Wrist 1.9 ms Peak Lat Diff 0.72 ms <0.30       Electromyography     Side Muscle Ins Act Fibs/PSW Fasc HF Amp Dur Poly Recrt Int Pat   Left FCU Nml None Nml 0 Nml  Nml 0 Nml Nml   Left FCR Nml None Nml 0 Nml Nml 0 Nml Nml   Left FDI Nml None Nml 0 Nml Nml 0 Nml Nml   Left FPL Nml None Nml 0 Nml Nml 0 Nml Nml   Left EDC Nml None Nml 0 Nml Nml 0 Nml Nml         NCS Waveforms:    Motor                Sensory

## 2022-07-27 NOTE — LETTER
2022       RE: Erwin Aguilar  [address]  [place] [address]     Dear Colleague,    Thank you for referring your patient, Erwin Agiular, to the [facility] [place] [facility]. Please see a copy of my visit note below.                        [facility]  Electrodiagnostic Laboratory                 Department of Neurology                                                                                                         Test Date:  2022    Patient: Erwin Aguilar : 1960 Physician: Roland Bass MD   Sex: Male AGE: 61 year Ref Phys:    ID#: [id number]   Technician: Kristy Behling     History and Examination:  Erwin Agiular is a 61 year old man with numbness in the left ulnar distribution and a past history of right ulnar transposition, after presenting with similar symptoms in the past. He is referred for evaluation of suspected ulnar neuropathy at the elbow.    Techniques:  Motor conduction studies were done with surface recording electrodes. Sensory conduction studies were performed with surface electrodes, unless indicated otherwise by (n), designating the use of subdermal recording electrodes. Temperature was monitored and recorded throughout the study. Upper extremities were maintained at a temperature of 32 degrees Centigrade or higher.  EMG was done with a concentric needle electrode. Paraspinal electromyography was deferred because the patient is on an anticoagulant and radiculopathy was not suspected clinically.    Results:  Left median and ulnar sensory, mixed nerve, and motor conduction studies demonstrated moderate ulnar motor conduction slowing across the elbow as well as mild slowing of median palmar conduction and marginal relative prolongation of median distal latencies. A left radial sensory conduction study was normal.     Interpretation:  This is an abnormal study, demonstrating  electrophysiologic evidence of the followin. Mild to moderate left ulnar neuropathy at the elbow.  2. 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      _____________________________  Roland Bass MD  Board Certified in Clinical Neurophysiology and Neuromuscular Medicine        Nerve Conduction Studies  Motor Sites      Latency Amplitude Neg. Amp Diff Segment Distance Velocity Neg. Dur Neg Area Diff Temperature Comment   Site (ms) Norm (mV) Norm %  cm m/s Norm ms %  C    Left Median (APB) Motor   Wrist 4.2  < 4.4 5.5  > 5.0  Wrist-APB 8   4.5  32.6    Elbow 9.1 - 4.3 - -21.8 Elbow-Wrist 25 51  > 48 5.2 -10.9 32.6    Left Median/Ulnar (Lumb-Dors Int II) Motor        Median (Lumb I)   Wrist 3.9 - 0.49 -      8.9  34.1         Ulnar (Dorsal Int (manus))   Wrist 3.3 - 1.94 -      5.2  34    Left Ulnar (ADM) Motor   Wrist 2.7  < 3.5 9.0  > 5.0  Wrist-ADM 8   6.6  32.4    Bel Elbow 6.4 - 7.9 - -12.2 Bel Elbow-Wrist 23 62  > 48 6.7 -4.7 32.4    Abv Elbow 9.5 - 6.6 - -16.5 Abv Elbow-Bel Elbow 10 32  > 48 7.2 -5.4 32.4    Erb's Pt. 11.1 - 6.8 - 3.0 Erb's Pt.-Abv Elbow 10 63 - 7.1 -3.1 32.4    Left Ulnar (FDI) Motor   Wrist 3.6 - 9.6 -      4.5  32.3    Bel Elbow 7.8 - 8.1 - -15.6 Bel Elbow-Wrist 20 48  > 48 5.0 -4.4 32.3    Abv Elbow 10.7 - 7.2 - -11.1 Abv Elbow-Bel Elbow 10 34  > 48 5.3 -9.7 32.3    Erb's Pt. 12.0 - 7.0 - -2.8 Erb's Pt.-Abv Elbow 10 77 - 5.7 -1.14 32.3      Sensory Sites      Onset Lat Peak Lat Amp (O-P) Amp (P-P) Segment Distance Velocity Temperature   Site ms ms  V Norm  V  cm m/s Norm  C   Left Median Sensory   Wrist-Dig II 2.9 3.9 24  > 10 67 Wrist-Dig II 14 48  > 48 34.5   Left Median-Ulnar Palmar Sensory        Median   Palm-Wrist 1.95 2.6 37 - 79 Palm-Wrist 8 41 - 33.9        Ulnar   Palm-Wrist 1.25 1.88 16 - 20 Palm-Wrist 8 64 - 33.3   Left Radial Sensory   Forearm-Wrist 1.90 2.5 19  > 15 23 Forearm-Wrist 10 53 - 34.3   Left Ulnar Sensory   Wrist-Dig V 2.4 3.1 17  > 8 12 Wrist-Dig V 12.5 52  > 48  34.2     Inter-Nerve Comparisons     Nerve 1 Value 1 Nerve 2 Value 2 Parameter Result Normal   Sensory Sites   L Median Palm-Wrist 2.6 ms L Ulnar Palm-Wrist 1.9 ms Peak Lat Diff 0.72 ms <0.30       Electromyography     Side Muscle Ins Act Fibs/PSW Fasc HF Amp Dur Poly Recrt Int Pat   Left FCU Nml None Nml 0 Nml Nml 0 Nml Nml   Left FCR Nml None Nml 0 Nml Nml 0 Nml Nml   Left FDI Nml None Nml 0 Nml Nml 0 Nml Nml   Left FPL Nml None Nml 0 Nml Nml 0 Nml Nml   Left EDC Nml None Nml 0 Nml Nml 0 Nml Nml         NCS Waveforms:    Motor                Sensory                      Sincerely,    Roland Bass MD

## 2022-08-01 ENCOUNTER — OFFICE VISIT (OUTPATIENT)
Dept: ORTHOPEDICS | Facility: CLINIC | Age: 62
End: 2022-08-01
Payer: MEDICARE

## 2022-08-01 ENCOUNTER — PRE VISIT (OUTPATIENT)
Dept: ORTHOPEDICS | Facility: CLINIC | Age: 62
End: 2022-08-01

## 2022-08-01 DIAGNOSIS — G56.02 CARPAL TUNNEL SYNDROME OF LEFT WRIST: Primary | ICD-10-CM

## 2022-08-01 DIAGNOSIS — G56.22 ULNAR NEURITIS, LEFT: ICD-10-CM

## 2022-08-01 PROCEDURE — 99214 OFFICE O/P EST MOD 30 MIN: CPT | Performed by: PHYSICIAN ASSISTANT

## 2022-08-01 NOTE — PROGRESS NOTES
DME FITTING    Relevant Diagnosis: Left wrist Carpal Tunnel  Quick Fit wrist brace was fit on patient's Left Wrist.     Person(s) involved in teaching:   Patient    Brace was applied in standard Manner:  Yes  Brace fit well:  Yes  Patient reports brace to fit comfortably:  Yes    Education:   Patient shown self application and removal of brace: Yes  Patient shown how to adjust brace fit, if necessary: Yes  Patient educated on billing and return policy: Yes  Patient confirmed understanding when and how to contact clinic with concerns: Yes

## 2022-08-01 NOTE — NURSING NOTE
Reason For Visit:   Chief Complaint   Patient presents with     Consult     Left Ulnar neuritis review EMG, discuss surgery options.       Primary MD: Wegener, Joel Daniel Irwin  Ref. MD: Wegener    Age: 61 year old    ?  No      There were no vitals taken for this visit.      Pain Assessment  Patient Currently in Pain: Yes (dull Pain in L elbow, heavy feeling and loss of strength)  0-10 Pain Scale: 8 (more of a dicomfort)  Primary Pain Location: Elbow (left)  Pain Descriptors: Dull    Hand Dominance Evaluation  Hand Dominance: Right          QuickDASH Assessment  No flowsheet data found.       Current Outpatient Medications   Medication Sig Dispense Refill     ACE/ARB NOT PRESCRIBED, INTENTIONAL, ACE & ARB not prescribed due to Allergy       albuterol (PROAIR HFA/PROVENTIL HFA/VENTOLIN HFA) 108 (90 Base) MCG/ACT inhaler INHALE 2 PUFFS BY MOUTH EVERY 6 HOURS AS NEEDED FOR SHORTNESS OF BREATH/DYSPNEA OR WHEEZING 18 g 1     ammonium lactate (LAC-HYDRIN) 12 % external cream Apply topically 2 times daily as needed for dry skin 385 g 3     amoxicillin-clavulanate (AUGMENTIN) 875-125 MG tablet Take 1 tablet by mouth 2 times daily 20 tablet 0     apixaban ANTICOAGULANT (ELIQUIS) 5 MG tablet Take 1 tablet (5 mg) by mouth 2 times daily 180 tablet 3     baclofen (LIORESAL) 10 MG tablet TAKE 2 TABLETS BY MOUTH THREE TIMES DAILY 180 tablet 3     blood glucose (ACCU-CHEK KARISHMA PLUS) test strip USE TO TEST BLOOD SUGARS ONCE DAILY (Patient not taking: Reported on 7/15/2022) 100 strip 1     blood glucose (NO BRAND SPECIFIED) test strip Use to test blood sugar 2 times daily or as directed.(freestyle kevin) 100 strip 3     blood glucose calibration (FREESTYLE CONTROL) solution Use to calibrate blood glucose monitor as directed. 1 each 1     blood glucose monitoring (FREESTYLE) lancets Use to test blood sugar two times daily. 180 each 3     blood glucose monitoring (NO BRAND SPECIFIED) meter device kit Use to test blood  sugar 2 times daily or as directed.(freestyle kevin) 1 kit 0     clindamycin (CLEOCIN) 150 MG capsule OPEN 1 CAP AND PLACE IN 1 LITER OF NORMAL SALINE & MIX. IRRIGATE WITH 20ML EACH NOSTRIL 4 TIMES/DAY (Patient not taking: Reported on 7/15/2022) 40 capsule 2     GLOBAL EASE INJECT PEN NEEDLES 32G X 4 MM miscellaneous USE AS DIRECTED EVERY DAY (Patient not taking: Reported on 7/15/2022) 100 each 2     insulin glargine (BASAGLAR KWIKPEN) 100 UNIT/ML pen ADMINISTER 26 UNITS UNDER THE SKIN DAILY 30 mL 2     medical cannabis (Patient's own supply) See Admin Instructions (The purpose of this order is to document that the patient reports taking medical cannabis.  This is not a prescription, and is not used to certify that the patient has a qualifying medical condition.)       potassium chloride ER (KLOR-CON M) 20 MEQ CR tablet TAKE 1 TABLET BY MOUTH DAILY (Patient not taking: Reported on 7/15/2022) 90 tablet 2     promethazine (PHENERGAN) 25 MG tablet TAKE 1 TABLET(25 MG) BY MOUTH THREE TIMES DAILY AS NEEDED FOR NAUSEA 90 tablet 4     propranolol (INDERAL) 40 MG tablet Take 1 tablet (40 mg) by mouth 3 times daily 360 tablet 3     rosuvastatin (CRESTOR) 20 MG tablet TAKE 1 TABLET (20 MG) BY MOUTH DAILY 30 tablet 5     sertraline (ZOLOFT) 100 MG tablet TAKE 2 TABLETS BY MOUTH EVERY  tablet 1     triamterene-HCTZ (DYAZIDE) 37.5-25 MG capsule TAKE 1 CAPSULE BY MOUTH EVERY MORNING 90 capsule 1       Allergies   Allergen Reactions     Levaquin Anaphylaxis and Rash     Swelling in lip and tongue felt thick     Lisinopril Anaphylaxis     Swollen tongue; inability to swallow; drooling; hives; swollen face, neck, angioedema     Acetaminophen      Hx of cirrhosis      Amlodipine      Swelling, hives, possible angioedema       Morphine      b/p dropped and arms went numb  Hypotension     Quinolones Hives     Spironolactone Unknown     Pt believes himself to be allergic to it; cannot find his old records of this.-[facility]       Bactrim [Sulfamethoxazole W/Trimethoprim] Rash       LUANA CHANDLER, ATC

## 2022-08-01 NOTE — PROGRESS NOTES
Date of Service: Aug 1, 2022    Chief Complaint:   Chief Complaint   Patient presents with     Consult     Left Ulnar neuritis review EMG, discuss surgery options.     History of Present Illness: Erwin Aguilar is a 61 year old, right handed male who presents today for further evaluation of left hand numbness and tingling. Numbness and tingling effects the thumb, index, long (middle), ring and small.  He notices some mild intermittent tingling of the thumb index and middle finger particularly when he wakes up in the morning.  The small and ring finger numbness and tingling is more present when riding his bike or playing his guitar.  Symptoms first began 6-12 months ago. There was not an inciting event. Symptoms DO wake the patient up at night. Symptoms made worse during the following activities: riding his electric bike, playing guitar. He notices that he sometimes drops in guitar, and has difficulty holding on to a key to start his electric bike. Symptoms are present intermittently. The following modalities have been tried: hand therapy for nerve gliding, intermittent night extension splinting at the elbow.     Patient has had multiple orthopedic injuries and procedures of the left years including cauda equina resulting in bilateral lower extremity weakness/numbness. He uses a wheelchair for ambulation at home and a cane when out and about due to a right heel injury and chronic pain.  He rides an electric bike most days. Patient notes that he would like to avoid any surgery until the fall so he can continue riding his bike. He does have a history of right ulnar nerve transposition in his 20's.     Review of Systems: A 14-point review of systems was obtained on intake and reviewed.      Past Medical History:   Diagnosis Date     Actinic keratosis     aldara     Anxiety      Cancer (H)     squamous cell skin CA     Cauda equina spinal cord injury (H)      Chronic sinusitis 5-1-16     Depressive disorder       Diastasis recti      Esophageal reflux      Esophageal varices in cirrhosis (H) 4/1/2014    Hospitalized for UGI blee 3/28/14, endoscopy revealed bleeding varices.     Essential hypertension, benign      Intermittent asthma      Mild depression (H)      Mixed hyperlipidemia      Nasal polyps 5-1-16     Osteoarthritis of lumbar spine, unspecified spinal osteoarthritis complication status      Other chronic pain      Paroxysmal atrial fibrillation (H) 9/22/2021     SCCA (squamous cell carcinoma) of skin      Seasonal allergic conjunctivitis      Type II or unspecified type diabetes mellitus without mention of complication, not stated as uncontrolled      Unspecified site of spinal cord injury without evidence of spinal bone injury     due to back surgery       Past Surgical History:   Procedure Laterality Date     ABDOMEN SURGERY  2014     BACK SURGERY  August 2009     COLONOSCOPY N/A 5/12/2016    Procedure: COMBINED COLONOSCOPY, SINGLE OR MULTIPLE BIOPSY/POLYPECTOMY BY BIOPSY;  Surgeon: Ana Paula Urbina MD;  Location:  GI     ENDOSCOPY UPPER, COLONOSCOPY, COMBINED  10/19/2011    Procedure:COMBINED ENDOSCOPY UPPER, COLONOSCOPY; Upper Endoscopy, Colonoscopy with Polypectomy x4; Surgeon:AMBAR RODRÍGUEZ; Location: OR     ENT SURGERY  1-2016    Ongoing since then     ESOPHAGOSCOPY, GASTROSCOPY, DUODENOSCOPY (EGD), COMBINED  3/28/2014    Procedure: COMBINED ESOPHAGOSCOPY, GASTROSCOPY, DUODENOSCOPY (EGD);  EGD, Gastric Biopsies, Esophageal Banding;  Surgeon: Reyna Tovar MD;  Location:  OR     ESOPHAGOSCOPY, GASTROSCOPY, DUODENOSCOPY (EGD), COMBINED  6/9/2014    Procedure: COMBINED ESOPHAGOSCOPY, GASTROSCOPY, DUODENOSCOPY (EGD);  Surgeon: Curtis Mendez MD;  Location:  GI     ESOPHAGOSCOPY, GASTROSCOPY, DUODENOSCOPY (EGD), COMBINED  7/24/2014    Procedure: COMBINED ESOPHAGOSCOPY, GASTROSCOPY, DUODENOSCOPY (EGD);  Surgeon: Gerard Samaniego MD;  Location:  OR     ESOPHAGOSCOPY,  GASTROSCOPY, DUODENOSCOPY (EGD), COMBINED N/A 10/31/2014    Procedure: COMBINED ESOPHAGOSCOPY, GASTROSCOPY, DUODENOSCOPY (EGD);  Surgeon: Gerard Samaniego MD;  Location: [facility] OR     ESOPHAGOSCOPY, GASTROSCOPY, DUODENOSCOPY (EGD), COMBINED N/A 5/12/2016    Procedure: COMBINED ESOPHAGOSCOPY, GASTROSCOPY, DUODENOSCOPY (EGD);  Surgeon: Ana Paula Urbina MD;  Location: [facility] GI     ESOPHAGOSCOPY, GASTROSCOPY, DUODENOSCOPY (EGD), COMBINED N/A 8/2/2018    Procedure: COMBINED ESOPHAGOSCOPY, GASTROSCOPY, DUODENOSCOPY (EGD);  EGD;  Surgeon: Yu Wagner MD;  Location: [facility] GI     HCL SQUAMOUS CELL CARCINOMA AG  10/07    left forearm     HERNIORRHAPHY UMBILICAL  11/8/2012    Procedure: HERNIORRHAPHY UMBILICAL;  Open Umbilical Hernia Repair With Mesh ;  Surgeon: Chase Nicholson MD;  Location: [facility] OR     INSERT STIMULATOR DORSAL COLUMN N/A 6/28/2018    Procedure: INSERT STIMULATOR DORSAL COLUMN;;  Surgeon: Elvis Sauceda MD;  Location: [facility] OR     neuro stimulator  2010     REMOVE GENERATOR STIMULATOR (LOCATION) N/A 6/28/2018    Procedure: REMOVE GENERATOR STIMULATOR (LOCATION);  Spinal Cord Stimulator and IPG Explant and Re-Implant of SCS System (Leads and IPG);  Surgeon: Elvis Sauceda MD;  Location: [facility] OR     REPAIR TENDON ACHILLES Right 11/11/2020    Procedure: Right achilles debridement and partial calcaneus excision;  Surgeon: Eh Sandoval MD;  Location: [facility] OR     SURGICAL HISTORY OF -   1/02    abcess tooth     SURGICAL HISTORY OF -   1999    L4-5 laminectomy, cauda equina syndrome     SURGICAL HISTORY OF -   +    herniated disk repair     TONSILLECTOMY  10 1964     TRANSPOSITION ULNAR NERVE (ELBOW)      right     ZZC APPENDECTOMY  1974         Current Outpatient Medications:      ACE/ARB NOT PRESCRIBED, INTENTIONAL,, ACE & ARB not prescribed due to Allergy, Disp: , Rfl:      albuterol (PROAIR HFA/PROVENTIL HFA/VENTOLIN HFA) 108 (90 Base)  MCG/ACT inhaler, INHALE 2 PUFFS BY MOUTH EVERY 6 HOURS AS NEEDED FOR SHORTNESS OF BREATH/DYSPNEA OR WHEEZING, Disp: 18 g, Rfl: 1     ammonium lactate (LAC-HYDRIN) 12 % external cream, Apply topically 2 times daily as needed for dry skin, Disp: 385 g, Rfl: 3     amoxicillin-clavulanate (AUGMENTIN) 875-125 MG tablet, Take 1 tablet by mouth 2 times daily, Disp: 20 tablet, Rfl: 0     apixaban ANTICOAGULANT (ELIQUIS) 5 MG tablet, Take 1 tablet (5 mg) by mouth 2 times daily, Disp: 180 tablet, Rfl: 3     baclofen (LIORESAL) 10 MG tablet, TAKE 2 TABLETS BY MOUTH THREE TIMES DAILY, Disp: 180 tablet, Rfl: 3     blood glucose (ACCU-CHEK KARISHMA PLUS) test strip, USE TO TEST BLOOD SUGARS ONCE DAILY (Patient not taking: Reported on 7/15/2022), Disp: 100 strip, Rfl: 1     blood glucose (NO BRAND SPECIFIED) test strip, Use to test blood sugar 2 times daily or as directed.(freestyle kevin), Disp: 100 strip, Rfl: 3     blood glucose calibration (FREESTYLE CONTROL) solution, Use to calibrate blood glucose monitor as directed., Disp: 1 each, Rfl: 1     blood glucose monitoring (FREESTYLE) lancets, Use to test blood sugar two times daily., Disp: 180 each, Rfl: 3     blood glucose monitoring (NO BRAND SPECIFIED) meter device kit, Use to test blood sugar 2 times daily or as directed.(freestyle kevin), Disp: 1 kit, Rfl: 0     clindamycin (CLEOCIN) 150 MG capsule, OPEN 1 CAP AND PLACE IN 1 LITER OF NORMAL SALINE & MIX. IRRIGATE WITH 20ML EACH NOSTRIL 4 TIMES/DAY (Patient not taking: Reported on 7/15/2022), Disp: 40 capsule, Rfl: 2     GLOBAL EASE INJECT PEN NEEDLES 32G X 4 MM miscellaneous, USE AS DIRECTED EVERY DAY (Patient not taking: Reported on 7/15/2022), Disp: 100 each, Rfl: 2     insulin glargine (BASAGLAR KWIKPEN) 100 UNIT/ML pen, ADMINISTER 26 UNITS UNDER THE SKIN DAILY, Disp: 30 mL, Rfl: 2     medical cannabis (Patient's own supply), See Admin Instructions (The purpose of this order is to document that the patient reports taking  medical cannabis.  This is not a prescription, and is not used to certify that the patient has a qualifying medical condition.), Disp: , Rfl:      potassium chloride ER (KLOR-CON M) 20 MEQ CR tablet, TAKE 1 TABLET BY MOUTH DAILY (Patient not taking: Reported on 7/15/2022), Disp: 90 tablet, Rfl: 2     promethazine (PHENERGAN) 25 MG tablet, TAKE 1 TABLET(25 MG) BY MOUTH THREE TIMES DAILY AS NEEDED FOR NAUSEA, Disp: 90 tablet, Rfl: 4     propranolol (INDERAL) 40 MG tablet, Take 1 tablet (40 mg) by mouth 3 times daily, Disp: 360 tablet, Rfl: 3     rosuvastatin (CRESTOR) 20 MG tablet, TAKE 1 TABLET (20 MG) BY MOUTH DAILY, Disp: 30 tablet, Rfl: 5     sertraline (ZOLOFT) 100 MG tablet, TAKE 2 TABLETS BY MOUTH EVERY DAY, Disp: 180 tablet, Rfl: 1     triamterene-HCTZ (DYAZIDE) 37.5-25 MG capsule, TAKE 1 CAPSULE BY MOUTH EVERY MORNING, Disp: 90 capsule, Rfl: 1    Allergies   Allergen Reactions     Levaquin Anaphylaxis and Rash     Swelling in lip and tongue felt thick     Lisinopril Anaphylaxis     Swollen tongue; inability to swallow; drooling; hives; swollen face, neck, angioedema     Acetaminophen      Hx of cirrhosis      Amlodipine      Swelling, hives, possible angioedema       Morphine      b/p dropped and arms went numb  Hypotension     Quinolones Hives     Spironolactone Unknown     Pt believes himself to be allergic to it; cannot find his old records of this.-mjc      Bactrim [Sulfamethoxazole W/Trimethoprim] Rash       Social History     Tobacco Use     Smoking status: Former Smoker     Packs/day: 0.00     Years: 1.00     Pack years: 0.00     Types: Cigarettes     Start date: 10/13/1983     Quit date: 1984     Years since quittin.1     Smokeless tobacco: Never Used   Substance Use Topics     Alcohol use: No     Alcohol/week: 0.0 standard drinks     Comment: a pint of alcohol / day - last drink was 3/28/14     Drug use: Yes     Frequency: 2.0 times per week     Types: Marijuana     Comment: medical  marijuana - occasional       Family History   Problem Relation Age of Onset     Cancer Mother         lung     Breast Cancer Mother      Other Cancer Mother      Cancer Father         esophogeal, GERD     Snoring Father      Diabetes Brother         amputation, Type 1     Diabetes Brother      Diabetes Brother      Cancer - colorectal No family hx of      Glaucoma No family hx of      Macular Degeneration No family hx of        Physical examination:  Well-developed, well-nourished and in no acute distress.  Alert and oriented to surroundings.  On examination of the left upper extremity:     Skin clean, dry and intact  Sensation:  Median: diminished  Radial: intact  Ulnar: diminished  Thumb opposition strength: intact  Interosseous strength: diminished  Thenar atrophy: Not Present  Interosseous atrophy: Not Present  Tinel's over carpal tunnel: Present  Tinel's over cubital tunnel: Present  Phalen's: Negative  Carpal tunnel compression: Positive  Elbow flexion compression: Positive  Negative fromets    Two point discrimination (mm):   Median: 5 mm  Ulnar: 6 mm    EMG: EMG obtained on 22 demonstrated the following:  Results:  Left median and ulnar sensory, mixed nerve, and motor conduction studies demonstrated moderate ulnar motor conduction slowing across the elbow as well as mild slowing of median palmar conduction and marginal relative prolongation of median distal latencies. A left radial sensory conduction study was normal.      Interpretation:  This is an abnormal study, demonstrating electrophysiologic evidence of the followin. Mild to moderate left ulnar neuropathy at the elbow.  2. 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    Assessment: 61 year old male with:  1. Mild left carpal tunnel syndrome. Has not tried night splinting.   2. Mild to moderated left cubital tunnel syndrome.     Plan: We discussed the possible treatment options. The first would be to continue consistnent night splinting for both  the wrist and elbow. and to observe the symptoms for any change or progression. The second would be to perform a corticosteroid injection for the carpal and cubital tunnel. The third is to consider surgery, which would be an cubital tunnel release. I briefly described the procedure, post-operative protocol, and expected outcomes. We discussed that given his symptoms are intermittent and he has not consistently trailed night splinting, recommended a trial of consistent conservative management. Patient in agreement as he would like to avoid any surgery until after biking season. HE is given an off the shelf carpal tunnel night splint. He has a home elbow extension splint. Recommended antiinflammatories and avoiding activities with the elbow bent. Patient agreeable. He will following up in 6 weeks for recheck.      MICHAEL LEO PA-C  8/1/2022 9:49 AM   Orthopaedic Surgery

## 2022-08-01 NOTE — LETTER
8/1/2022         RE: Erwin Aguilar  [address]  [place] [address]        Dear Colleague,    Thank you for referring your patient, Erwin Aguilar, to the [facility] [place]. Please see a copy of my visit note below.    Date of Service: Aug 1, 2022    Chief Complaint:   Chief Complaint   Patient presents with     Consult     Left Ulnar neuritis review EMG, discuss surgery options.     History of Present Illness: Erwin Aguilar is a 61 year old, right handed male who presents today for further evaluation of left hand numbness and tingling. Numbness and tingling effects the thumb, index, long (middle), ring and small.  He notices some mild intermittent tingling of the thumb index and middle finger particularly when he wakes up in the morning.  The small and ring finger numbness and tingling is more present when riding his bike or playing his guitar.  Symptoms first began 6-12 months ago. There was not an inciting event. Symptoms DO wake the patient up at night. Symptoms made worse during the following activities: riding his electric bike, playing guitar. He notices that he sometimes drops in guitar, and has difficulty holding on to a key to start his electric bike. Symptoms are present intermittently. The following modalities have been tried: hand therapy for nerve gliding, intermittent night extension splinting at the elbow.     Patient has had multiple orthopedic injuries and procedures of the left years including cauda equina resulting in bilateral lower extremity weakness/numbness. He uses a wheelchair for ambulation at home and a cane when out and about due to a right heel injury and chronic pain.  He rides an electric bike most days. Patient notes that he would like to avoid any surgery until the fall so he can continue riding his bike. He does have a history of right ulnar nerve transposition in his 20's.     Review of Systems: A 14-point review of systems was  obtained on intake and reviewed.      Past Medical History:   Diagnosis Date     Actinic keratosis     aldara     Anxiety      Cancer (H)     squamous cell skin CA     Cauda equina spinal cord injury (H)      Chronic sinusitis 5-1-16     Depressive disorder      Diastasis recti      Esophageal reflux      Esophageal varices in cirrhosis (H) 4/1/2014    Hospitalized for UGI blee 3/28/14, endoscopy revealed bleeding varices.     Essential hypertension, benign      Intermittent asthma      Mild depression (H)      Mixed hyperlipidemia      Nasal polyps 5-1-16     Osteoarthritis of lumbar spine, unspecified spinal osteoarthritis complication status      Other chronic pain      Paroxysmal atrial fibrillation (H) 9/22/2021     SCCA (squamous cell carcinoma) of skin      Seasonal allergic conjunctivitis      Type II or unspecified type diabetes mellitus without mention of complication, not stated as uncontrolled      Unspecified site of spinal cord injury without evidence of spinal bone injury     due to back surgery       Past Surgical History:   Procedure Laterality Date     ABDOMEN SURGERY  2014     BACK SURGERY  August 2009     COLONOSCOPY N/A 5/12/2016    Procedure: COMBINED COLONOSCOPY, SINGLE OR MULTIPLE BIOPSY/POLYPECTOMY BY BIOPSY;  Surgeon: Ana Paula Urbina MD;  Location:  GI     ENDOSCOPY UPPER, COLONOSCOPY, COMBINED  10/19/2011    Procedure:COMBINED ENDOSCOPY UPPER, COLONOSCOPY; Upper Endoscopy, Colonoscopy with Polypectomy x4; Surgeon:AMBAR RODRÍGUEZ; Location: OR     ENT SURGERY  1-2016    Ongoing since then     ESOPHAGOSCOPY, GASTROSCOPY, DUODENOSCOPY (EGD), COMBINED  3/28/2014    Procedure: COMBINED ESOPHAGOSCOPY, GASTROSCOPY, DUODENOSCOPY (EGD);  EGD, Gastric Biopsies, Esophageal Banding;  Surgeon: Reyna Tovar MD;  Location:  OR     ESOPHAGOSCOPY, GASTROSCOPY, DUODENOSCOPY (EGD), COMBINED  6/9/2014    Procedure: COMBINED ESOPHAGOSCOPY, GASTROSCOPY, DUODENOSCOPY (EGD);   Surgeon: Curtis Mendez MD;  Location: [facility] GI     ESOPHAGOSCOPY, GASTROSCOPY, DUODENOSCOPY (EGD), COMBINED  7/24/2014    Procedure: COMBINED ESOPHAGOSCOPY, GASTROSCOPY, DUODENOSCOPY (EGD);  Surgeon: Gerard Samaniego MD;  Location: [facility] OR     ESOPHAGOSCOPY, GASTROSCOPY, DUODENOSCOPY (EGD), COMBINED N/A 10/31/2014    Procedure: COMBINED ESOPHAGOSCOPY, GASTROSCOPY, DUODENOSCOPY (EGD);  Surgeon: Gerard Samaniego MD;  Location: [facility] OR     ESOPHAGOSCOPY, GASTROSCOPY, DUODENOSCOPY (EGD), COMBINED N/A 5/12/2016    Procedure: COMBINED ESOPHAGOSCOPY, GASTROSCOPY, DUODENOSCOPY (EGD);  Surgeon: Ana Paula Urbina MD;  Location: [facility] GI     ESOPHAGOSCOPY, GASTROSCOPY, DUODENOSCOPY (EGD), COMBINED N/A 8/2/2018    Procedure: COMBINED ESOPHAGOSCOPY, GASTROSCOPY, DUODENOSCOPY (EGD);  EGD;  Surgeon: Yu Wagner MD;  Location: [facility]     HCL SQUAMOUS CELL CARCINOMA AG  10/07    left forearm     HERNIORRHAPHY UMBILICAL  11/8/2012    Procedure: HERNIORRHAPHY UMBILICAL;  Open Umbilical Hernia Repair With Mesh ;  Surgeon: Chase Nicholson MD;  Location: [facility] OR     INSERT STIMULATOR DORSAL COLUMN N/A 6/28/2018    Procedure: INSERT STIMULATOR DORSAL COLUMN;;  Surgeon: Elvis Sauceda MD;  Location: [facility] OR     neuro stimulator  2010     REMOVE GENERATOR STIMULATOR (LOCATION) N/A 6/28/2018    Procedure: REMOVE GENERATOR STIMULATOR (LOCATION);  Spinal Cord Stimulator and IPG Explant and Re-Implant of SCS System (Leads and IPG);  Surgeon: Elvis Sauceda MD;  Location: [facility] OR     REPAIR TENDON ACHILLES Right 11/11/2020    Procedure: Right achilles debridement and partial calcaneus excision;  Surgeon: Eh Sandoval MD;  [place] [facility] [place]     SURGICAL HISTORY OF -   1/02    abcess tooth     SURGICAL HISTORY OF -   1999    L4-5 laminectomy, cauda equina syndrome     SURGICAL HISTORY OF -   +    herniated disk repair     TONSILLECTOMY  10 1964     TRANSPOSITION ULNAR  NERVE (ELBOW)      right     ZZC APPENDECTOMY  1974         Current Outpatient Medications:      ACE/ARB NOT PRESCRIBED, INTENTIONAL,, ACE & ARB not prescribed due to Allergy, Disp: , Rfl:      albuterol (PROAIR HFA/PROVENTIL HFA/VENTOLIN HFA) 108 (90 Base) MCG/ACT inhaler, INHALE 2 PUFFS BY MOUTH EVERY 6 HOURS AS NEEDED FOR SHORTNESS OF BREATH/DYSPNEA OR WHEEZING, Disp: 18 g, Rfl: 1     ammonium lactate (LAC-HYDRIN) 12 % external cream, Apply topically 2 times daily as needed for dry skin, Disp: 385 g, Rfl: 3     amoxicillin-clavulanate (AUGMENTIN) 875-125 MG tablet, Take 1 tablet by mouth 2 times daily, Disp: 20 tablet, Rfl: 0     apixaban ANTICOAGULANT (ELIQUIS) 5 MG tablet, Take 1 tablet (5 mg) by mouth 2 times daily, Disp: 180 tablet, Rfl: 3     baclofen (LIORESAL) 10 MG tablet, TAKE 2 TABLETS BY MOUTH THREE TIMES DAILY, Disp: 180 tablet, Rfl: 3     blood glucose (ACCU-CHEK KARISHMA PLUS) test strip, USE TO TEST BLOOD SUGARS ONCE DAILY (Patient not taking: Reported on 7/15/2022), Disp: 100 strip, Rfl: 1     blood glucose (NO BRAND SPECIFIED) test strip, Use to test blood sugar 2 times daily or as directed.(freestyle kevin), Disp: 100 strip, Rfl: 3     blood glucose calibration (FREESTYLE CONTROL) solution, Use to calibrate blood glucose monitor as directed., Disp: 1 each, Rfl: 1     blood glucose monitoring (FREESTYLE) lancets, Use to test blood sugar two times daily., Disp: 180 each, Rfl: 3     blood glucose monitoring (NO BRAND SPECIFIED) meter device kit, Use to test blood sugar 2 times daily or as directed.(freestyle kevin), Disp: 1 kit, Rfl: 0     clindamycin (CLEOCIN) 150 MG capsule, OPEN 1 CAP AND PLACE IN 1 LITER OF NORMAL SALINE & MIX. IRRIGATE WITH 20ML EACH NOSTRIL 4 TIMES/DAY (Patient not taking: Reported on 7/15/2022), Disp: 40 capsule, Rfl: 2     GLOBAL EASE INJECT PEN NEEDLES 32G X 4 MM miscellaneous, USE AS DIRECTED EVERY DAY (Patient not taking: Reported on 7/15/2022), Disp: 100 each, Rfl: 2      insulin glargine (BASAGLAR KWIKPEN) 100 UNIT/ML pen, ADMINISTER 26 UNITS UNDER THE SKIN DAILY, Disp: 30 mL, Rfl: 2     medical cannabis (Patient's own supply), See Admin Instructions (The purpose of this order is to document that the patient reports taking medical cannabis.  This is not a prescription, and is not used to certify that the patient has a qualifying medical condition.), Disp: , Rfl:      potassium chloride ER (KLOR-CON M) 20 MEQ CR tablet, TAKE 1 TABLET BY MOUTH DAILY (Patient not taking: Reported on 7/15/2022), Disp: 90 tablet, Rfl: 2     promethazine (PHENERGAN) 25 MG tablet, TAKE 1 TABLET(25 MG) BY MOUTH THREE TIMES DAILY AS NEEDED FOR NAUSEA, Disp: 90 tablet, Rfl: 4     propranolol (INDERAL) 40 MG tablet, Take 1 tablet (40 mg) by mouth 3 times daily, Disp: 360 tablet, Rfl: 3     rosuvastatin (CRESTOR) 20 MG tablet, TAKE 1 TABLET (20 MG) BY MOUTH DAILY, Disp: 30 tablet, Rfl: 5     sertraline (ZOLOFT) 100 MG tablet, TAKE 2 TABLETS BY MOUTH EVERY DAY, Disp: 180 tablet, Rfl: 1     triamterene-HCTZ (DYAZIDE) 37.5-25 MG capsule, TAKE 1 CAPSULE BY MOUTH EVERY MORNING, Disp: 90 capsule, Rfl: 1    Allergies   Allergen Reactions     Levaquin Anaphylaxis and Rash     Swelling in lip and tongue felt thick     Lisinopril Anaphylaxis     Swollen tongue; inability to swallow; drooling; hives; swollen face, neck, angioedema     Acetaminophen      Hx of cirrhosis      Amlodipine      Swelling, hives, possible angioedema       Morphine      b/p dropped and arms went numb  Hypotension     Quinolones Hives     Spironolactone Unknown     Pt believes himself to be allergic to it; cannot find his old records of this.-mjc      Bactrim [Sulfamethoxazole W/Trimethoprim] Rash       Social History     Tobacco Use     Smoking status: Former Smoker     Packs/day: 0.00     Years: 1.00     Pack years: 0.00     Types: Cigarettes     Start date: 10/13/1983     Quit date: 1984     Years since quittin.1     Smokeless tobacco:  Never Used   Substance Use Topics     Alcohol use: No     Alcohol/week: 0.0 standard drinks     Comment: a pint of alcohol / day - last drink was 3/28/14     Drug use: Yes     Frequency: 2.0 times per week     Types: Marijuana     Comment: medical marijuana - occasional       Family History   Problem Relation Age of Onset     Cancer Mother         lung     Breast Cancer Mother      Other Cancer Mother      Cancer Father         esophogeal, GERD     Snoring Father      Diabetes Brother         amputation, Type 1     Diabetes Brother      Diabetes Brother      Cancer - colorectal No family hx of      Glaucoma No family hx of      Macular Degeneration No family hx of        Physical examination:  Well-developed, well-nourished and in no acute distress.  Alert and oriented to surroundings.  On examination of the left upper extremity:     Skin clean, dry and intact  Sensation:  Median: diminished  Radial: intact  Ulnar: diminished  Thumb opposition strength: intact  Interosseous strength: diminished  Thenar atrophy: Not Present  Interosseous atrophy: Not Present  Tinel's over carpal tunnel: Present  Tinel's over cubital tunnel: Present  Phalen's: Negative  Carpal tunnel compression: Positive  Elbow flexion compression: Positive  Negative fromets    Two point discrimination (mm):   Median: 5 mm  Ulnar: 6 mm    EMG: EMG obtained on 22 demonstrated the following:  Results:  Left median and ulnar sensory, mixed nerve, and motor conduction studies demonstrated moderate ulnar motor conduction slowing across the elbow as well as mild slowing of median palmar conduction and marginal relative prolongation of median distal latencies. A left radial sensory conduction study was normal.      Interpretation:  This is an abnormal study, demonstrating electrophysiologic evidence of the followin. Mild to moderate left ulnar neuropathy at the elbow.  2. 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    Assessment: 61 year old male  with:  1. Mild left carpal tunnel syndrome. Has not tried night splinting.   2. Mild to moderated left cubital tunnel syndrome.     Plan: We discussed the possible treatment options. The first would be to continue consistnent night splinting for both the wrist and elbow. and to observe the symptoms for any change or progression. The second would be to perform a corticosteroid injection for the carpal and cubital tunnel. The third is to consider surgery, which would be an cubital tunnel release. I briefly described the procedure, post-operative protocol, and expected outcomes. We discussed that given his symptoms are intermittent and he has not consistently trailed night splinting, recommended a trial of consistent conservative management. Patient in agreement as he would like to avoid any surgery until after biking season. HE is given an off the shelf carpal tunnel night splint. He has a home elbow extension splint. Recommended antiinflammatories and avoiding activities with the elbow bent. Patient agreeable. He will following up in 6 weeks for recheck.      MICHAEL LEO PA-C  8/1/2022 9:49 AM   Orthopaedic Surgery     DME FITTING    Relevant Diagnosis: Left wrist Carpal Tunnel  Quick Fit wrist brace was fit on patient's Left Wrist.     Person(s) involved in teaching:   Patient    Brace was applied in standard Manner:  Yes  Brace fit well:  Yes  Patient reports brace to fit comfortably:  Yes    Education:   Patient shown self application and removal of brace: Yes  Patient shown how to adjust brace fit, if necessary: Yes  Patient educated on billing and return policy: Yes  Patient confirmed understanding when and how to contact clinic with concerns: Yes

## 2022-08-02 ENCOUNTER — MYC MEDICAL ADVICE (OUTPATIENT)
Dept: FAMILY MEDICINE | Facility: CLINIC | Age: 62
End: 2022-08-02

## 2022-08-02 ENCOUNTER — ALLIED HEALTH/NURSE VISIT (OUTPATIENT)
Dept: OPHTHALMOLOGY | Facility: CLINIC | Age: 62
End: 2022-08-02
Payer: MEDICARE

## 2022-08-02 ENCOUNTER — OFFICE VISIT (OUTPATIENT)
Dept: GASTROENTEROLOGY | Facility: CLINIC | Age: 62
End: 2022-08-02
Attending: INTERNAL MEDICINE
Payer: MEDICARE

## 2022-08-02 VITALS
BODY MASS INDEX: 29.75 KG/M2 | WEIGHT: 213.3 LBS | SYSTOLIC BLOOD PRESSURE: 163 MMHG | HEART RATE: 61 BPM | TEMPERATURE: 97.6 F | OXYGEN SATURATION: 99 % | DIASTOLIC BLOOD PRESSURE: 105 MMHG

## 2022-08-02 DIAGNOSIS — H52.13 MYOPIA OF BOTH EYES WITH ASTIGMATISM AND PRESBYOPIA: Primary | ICD-10-CM

## 2022-08-02 DIAGNOSIS — K70.30 ALCOHOLIC CIRRHOSIS OF LIVER WITHOUT ASCITES (H): Primary | ICD-10-CM

## 2022-08-02 DIAGNOSIS — I10 HYPERTENSION GOAL BP (BLOOD PRESSURE) < 130/80: ICD-10-CM

## 2022-08-02 DIAGNOSIS — H52.4 MYOPIA OF BOTH EYES WITH ASTIGMATISM AND PRESBYOPIA: Primary | ICD-10-CM

## 2022-08-02 DIAGNOSIS — H52.203 MYOPIA OF BOTH EYES WITH ASTIGMATISM AND PRESBYOPIA: Primary | ICD-10-CM

## 2022-08-02 PROCEDURE — 99213 OFFICE O/P EST LOW 20 MIN: CPT | Performed by: INTERNAL MEDICINE

## 2022-08-02 PROCEDURE — 999N000103 HC STATISTIC NO CHARGE FACILITY FEE

## 2022-08-02 PROCEDURE — G0463 HOSPITAL OUTPT CLINIC VISIT: HCPCS

## 2022-08-02 PROCEDURE — 99207 PR NO CHARGE LOS: CPT

## 2022-08-02 ASSESSMENT — PAIN SCALES - GENERAL: PAINLEVEL: NO PAIN (0)

## 2022-08-02 ASSESSMENT — REFRACTION_WEARINGRX
OD_SPHERE: -1.25
OD_CYLINDER: +0.50
OS_AXIS: 024
OD_AXIS: 024
OS_ADD: +2.25
OS_CYLINDER: +0.50
OS_SPHERE: -1.25
OD_ADD: +2.25

## 2022-08-02 NOTE — PROGRESS NOTES
[facility] Hepatology    Follow-up Visit    CHIEF COMPLAINT AND REASON FOR VISIT:  Alcoholic liver disease.    CONSULTING HEALTHCARE PROVIDER:  Joel Wegener, MD     SUBJECTIVE:  Mr. Aguilar is a 61-year-old male with alcoholic liver disease.  His ultrasound was read as cirrhotic, but it appears that his platelets are normal, and his APRI score is not compatible with that (0.178).  His initial presentation was gastrointestinal bleeding with hematemesis and melena.  He did have an upper scope and was found to have esophageal varices.  Mr. Aguilar now is alcohol free for 8 years.  His liver improved, and his MELD score dropped to as low as 6 and is currently 7.      Today he is telling me that his main issues are his other comorbidities, which include hypertension, and he is followed by a cardiologist.  He also had atrial fibrillation, and this has resolved, and he is on Eliquis for that.  He also had a fall and had injured his knee.  He developed a wound with cellulitis, and he is now on antibiotics for the last 2 weeks.  Otherwise, he does not have any jaundice, no edema.  He did intentionally lose weight around 50 pounds.  He eats salt free.  He does not show any signs of encephalopathy, like lethargy or confusion, and did not have any further bleeding after his initial presentation almost 8 years ago.  He also does not have any abdominal pain, nausea or vomiting now, but 2 weeks ago, he had a couple of episodes of vomiting, which he attributed to starting the antibiotics.  He is also moving his bowels like twice a day with no blood in it and has no issues on that side.  Please note that the patient was vaccinated with the Moderna and has done so far 3 doses.      Medical hx Surgical hx   Past Medical History:   Diagnosis Date     Actinic keratosis     aldara     Anxiety      Cancer (H)     squamous cell skin CA     Cauda equina spinal cord injury (H)      Chronic sinusitis 5-1-16     Depressive disorder       Diastasis recti      Esophageal reflux      Esophageal varices in cirrhosis (H) 4/1/2014    Hospitalized for UGI blee 3/28/14, endoscopy revealed bleeding varices.     Essential hypertension, benign      Intermittent asthma      Mild depression (H)      Mixed hyperlipidemia      Nasal polyps 5-1-16     Osteoarthritis of lumbar spine, unspecified spinal osteoarthritis complication status      Other chronic pain      Paroxysmal atrial fibrillation (H) 9/22/2021     SCCA (squamous cell carcinoma) of skin      Seasonal allergic conjunctivitis      Type II or unspecified type diabetes mellitus without mention of complication, not stated as uncontrolled      Unspecified site of spinal cord injury without evidence of spinal bone injury     due to back surgery      Past Surgical History:   Procedure Laterality Date     ABDOMEN SURGERY  2014     BACK SURGERY  August 2009     COLONOSCOPY N/A 5/12/2016    Procedure: COMBINED COLONOSCOPY, SINGLE OR MULTIPLE BIOPSY/POLYPECTOMY BY BIOPSY;  Surgeon: Ana Paula Urbina MD;  Location:  GI     ENDOSCOPY UPPER, COLONOSCOPY, COMBINED  10/19/2011    Procedure:COMBINED ENDOSCOPY UPPER, COLONOSCOPY; Upper Endoscopy, Colonoscopy with Polypectomy x4; Surgeon:AMBAR RODRÍGUEZ; Location: OR     ENT SURGERY  1-2016    Ongoing since then     ESOPHAGOSCOPY, GASTROSCOPY, DUODENOSCOPY (EGD), COMBINED  3/28/2014    Procedure: COMBINED ESOPHAGOSCOPY, GASTROSCOPY, DUODENOSCOPY (EGD);  EGD, Gastric Biopsies, Esophageal Banding;  Surgeon: Reyna Tovar MD;  Location:  OR     ESOPHAGOSCOPY, GASTROSCOPY, DUODENOSCOPY (EGD), COMBINED  6/9/2014    Procedure: COMBINED ESOPHAGOSCOPY, GASTROSCOPY, DUODENOSCOPY (EGD);  Surgeon: Curtis Mendez MD;  Location:  GI     ESOPHAGOSCOPY, GASTROSCOPY, DUODENOSCOPY (EGD), COMBINED  7/24/2014    Procedure: COMBINED ESOPHAGOSCOPY, GASTROSCOPY, DUODENOSCOPY (EGD);  Surgeon: Gerard Samaniego MD;  Location:  OR     ESOPHAGOSCOPY,  GASTROSCOPY, DUODENOSCOPY (EGD), COMBINED N/A 10/31/2014    Procedure: COMBINED ESOPHAGOSCOPY, GASTROSCOPY, DUODENOSCOPY (EGD);  Surgeon: Gerard Samaniego MD;  Location: [facility] OR     ESOPHAGOSCOPY, GASTROSCOPY, DUODENOSCOPY (EGD), COMBINED N/A 5/12/2016    Procedure: COMBINED ESOPHAGOSCOPY, GASTROSCOPY, DUODENOSCOPY (EGD);  Surgeon: Ana Paula Urbina MD;  Location: [facility] GI     ESOPHAGOSCOPY, GASTROSCOPY, DUODENOSCOPY (EGD), COMBINED N/A 8/2/2018    Procedure: COMBINED ESOPHAGOSCOPY, GASTROSCOPY, DUODENOSCOPY (EGD);  EGD;  Surgeon: Yu Wagner MD;  Location: [facility] GI     HCL SQUAMOUS CELL CARCINOMA AG  10/07    left forearm     HERNIORRHAPHY UMBILICAL  11/8/2012    Procedure: HERNIORRHAPHY UMBILICAL;  Open Umbilical Hernia Repair With Mesh ;  Surgeon: Chase Nicholson MD;  Location: [facility] OR     INSERT STIMULATOR DORSAL COLUMN N/A 6/28/2018    Procedure: INSERT STIMULATOR DORSAL COLUMN;;  Surgeon: Elvis Sauceda MD;  Location: [facility] OR     neuro stimulator  2010     REMOVE GENERATOR STIMULATOR (LOCATION) N/A 6/28/2018    Procedure: REMOVE GENERATOR STIMULATOR (LOCATION);  Spinal Cord Stimulator and IPG Explant and Re-Implant of SCS System (Leads and IPG);  Surgeon: Elvis Sauceda MD;  Location: [facility] OR     REPAIR TENDON ACHILLES Right 11/11/2020    Procedure: Right achilles debridement and partial calcaneus excision;  Surgeon: Eh Sandoval MD;  Location: [facility] OR     SURGICAL HISTORY OF -   1/02    abcess tooth     SURGICAL HISTORY OF -   1999    L4-5 laminectomy, cauda equina syndrome     SURGICAL HISTORY OF -   +    herniated disk repair     TONSILLECTOMY  10 1964     TRANSPOSITION ULNAR NERVE (ELBOW)      right     ZZC APPENDECTOMY  1974          Medications  Prior to Admission medications    Medication Sig Start Date End Date Taking? Authorizing Provider   ACE/ARB NOT PRESCRIBED, INTENTIONAL, ACE & ARB not prescribed due to Allergy 8/2/12   Yes Ksenia Bean APRN CNP   albuterol (PROAIR HFA/PROVENTIL HFA/VENTOLIN HFA) 108 (90 Base) MCG/ACT inhaler INHALE 2 PUFFS BY MOUTH EVERY 6 HOURS AS NEEDED FOR SHORTNESS OF BREATH/DYSPNEA OR WHEEZING 8/1/22  Yes Wegener, Joel Daniel Irwin, MD   ammonium lactate (LAC-HYDRIN) 12 % external cream Apply topically 2 times daily as needed for dry skin 3/31/22  Yes Wegener, Joel Daniel Irwin, MD   amoxicillin-clavulanate (AUGMENTIN) 875-125 MG tablet Take 1 tablet by mouth 2 times daily 7/22/22  Yes Wegener, Joel Daniel Irwin, MD   apixaban ANTICOAGULANT (ELIQUIS) 5 MG tablet Take 1 tablet (5 mg) by mouth 2 times daily 9/22/21  Yes Wegener, Joel Daniel Irwin, MD   baclofen (LIORESAL) 10 MG tablet TAKE 2 TABLETS BY MOUTH THREE TIMES DAILY 4/22/22  Yes Wegener, Joel Daniel Irwin, MD   blood glucose (NO BRAND SPECIFIED) test strip Use to test blood sugar 2 times daily or as directed.(freestyle kevin) 7/15/22  Yes Wegener, Joel Daniel Irwin, MD   blood glucose calibration (FREESTYLE CONTROL) solution Use to calibrate blood glucose monitor as directed. 7/15/22  Yes Wegener, Joel Daniel Irwin, MD   blood glucose monitoring (FREESTYLE) lancets Use to test blood sugar two times daily. 7/15/22  Yes Wegener, Joel Daniel Irwin, MD   blood glucose monitoring (NO BRAND SPECIFIED) meter device kit Use to test blood sugar 2 times daily or as directed.(freestyle kevin) 7/15/22  Yes Wegener, Joel Daniel Irwin, MD   insulin glargine (BASAGLAR KWIKPEN) 100 UNIT/ML pen ADMINISTER 26 UNITS UNDER THE SKIN DAILY 3/14/22  Yes Wegener, Joel Daniel Irwin, MD   medical cannabis (Patient's own supply) See Admin Instructions (The purpose of this order is to document that the patient reports taking medical cannabis.  This is not a prescription, and is not used to certify that the patient has a qualifying medical condition.)   Yes Reported, Patient   potassium chloride ER (KLOR-CON M) 20 MEQ CR tablet TAKE 1 TABLET BY MOUTH DAILY 12/6/21   Yes Wegener, Joel Daniel Irwin, MD   promethazine (PHENERGAN) 25 MG tablet TAKE 1 TABLET(25 MG) BY MOUTH THREE TIMES DAILY AS NEEDED FOR NAUSEA 7/13/22  Yes Wegener, Joel Daniel Irwin, MD   propranolol (INDERAL) 40 MG tablet Take 1 tablet (40 mg) by mouth 3 times daily 11/1/21  Yes Markus Fox MD   rosuvastatin (CRESTOR) 20 MG tablet TAKE 1 TABLET (20 MG) BY MOUTH DAILY 5/12/22  Yes Wegener, Joel Daniel Irwin, MD   sertraline (ZOLOFT) 100 MG tablet TAKE 2 TABLETS BY MOUTH EVERY DAY 4/6/22  Yes Wegener, Joel Daniel Irwin, MD   triamterene-HCTZ (DYAZIDE) 37.5-25 MG capsule TAKE 1 CAPSULE BY MOUTH EVERY MORNING 3/1/22  Yes Markus Fox MD   blood glucose (ACCU-CHEK KARISHMA PLUS) test strip USE TO TEST BLOOD SUGARS ONCE DAILY  Patient not taking: No sig reported 9/5/19   Wegener, Joel Daniel Irwin, MD   clindamycin (CLEOCIN) 150 MG capsule OPEN 1 CAP AND PLACE IN 1 LITER OF NORMAL SALINE & MIX. IRRIGATE WITH 20ML EACH NOSTRIL 4 TIMES/DAY  Patient not taking: No sig reported 12/23/21   Wegener, Joel Daniel Irwin, MD   GLOBAL EASE INJECT PEN NEEDLES 32G X 4 MM miscellaneous USE AS DIRECTED EVERY DAY  Patient not taking: No sig reported 9/29/21   Wegener, Joel Daniel Irwin, MD       Allergies  Allergies   Allergen Reactions     Levaquin Anaphylaxis and Rash     Swelling in lip and tongue felt thick     Lisinopril Anaphylaxis     Swollen tongue; inability to swallow; drooling; hives; swollen face, neck, angioedema     Acetaminophen      Hx of cirrhosis      Amlodipine      Swelling, hives, possible angioedema       Morphine      b/p dropped and arms went numb  Hypotension     Quinolones Hives     Spironolactone Unknown     Pt believes himself to be allergic to it; cannot find his old records of this.-[facility]      Bactrim [Sulfamethoxazole W/Trimethoprim] Rash       Review of systems  A 10-point review of systems was negative    Examination  BP (!) 163/105   Pulse 61   Temp 97.6  F (36.4  C) (Oral)   Wt 96.8 kg (213 lb 4.8  oz)   SpO2 99%   BMI 29.75 kg/m    Body mass index is 29.75 kg/m .    Gen- well, NAD, A+Ox3, normal color  Lym- no palpable LAD  CVS- RRR  RS- CTA  Abd- Not distended and no shifting dullness.  Extr- hands normal, no EVA  Skin- no rash or jaundice  Neuro- no asterixis  Psych- normal mood    Laboratory  Lab Results   Component Value Date     07/16/2022     04/09/2021    POTASSIUM 3.6 07/16/2022    POTASSIUM 3.7 07/15/2022    POTASSIUM 3.9 04/09/2021    CHLORIDE 105 07/16/2022    CHLORIDE 108 07/15/2022    CHLORIDE 109 04/09/2021    CO2 24 07/16/2022    CO2 24 07/15/2022    CO2 27 04/09/2021    BUN 11.2 07/16/2022    BUN 12 07/15/2022    BUN 7 04/09/2021    CR 0.83 07/16/2022    CR 0.68 04/09/2021       Lab Results   Component Value Date    BILITOTAL 0.5 07/15/2022    BILITOTAL 0.7 04/09/2021    ALT 23 07/15/2022    ALT 27 04/09/2021    AST 15 07/15/2022    AST 14 04/09/2021    ALKPHOS 100 07/15/2022    ALKPHOS 115 04/09/2021       Lab Results   Component Value Date    ALBUMIN 4.0 07/15/2022    ALBUMIN 4.1 04/09/2021    PROTTOTAL 7.4 07/15/2022    PROTTOTAL 8.0 04/09/2021        Lab Results   Component Value Date    WBC 9.0 07/16/2022    WBC 6.5 04/09/2021    HGB 14.0 07/16/2022    HGB 14.1 04/09/2021    MCV 88 07/16/2022    MCV 85 04/09/2021     07/16/2022     04/09/2021       Lab Results   Component Value Date    INR 1.08 07/16/2022    INR 1.01 04/09/2021       Radiology    ASSESSMENT AND PLAN:  Alcohol-related chronic liver disease.  Mr. Aguilar has alcohol-related liver disease.  His initial presentation was the gastrointestinal bleeding, which was over 8 years ago when he was drinking.  Otherwise, he remained very stable and his liver function tests completely normalized and his platelets are also normal.  We did increase the gap between his visits here and we will see him in a year.  We will order an abdominal ultrasound to check the liver.    For his other medical issues, he needs to  follow up with his cardiologist and his primary care physician.  We advised him the importance of controlling his blood pressures, and he is aware of that. He will need also to do his endoscopy and we will advise that.      I spent 30 minutes on this encounter of 08/02/2022 in chart reviewing, history taking, physical examination and documentation.  I spent some of the time also in coordination of care and counseling.      Kimberly Ramirez MD  Hepatology  [facility]

## 2022-08-02 NOTE — LETTER
8/2/2022         RE: Erwin Aguilar  [address]  [place] [address]        Dear Colleague,    Thank you for referring your patient, Erwin Aguilar, to the [facility] [place]. Please see a copy of my visit note below.    [facility] Hepatology    Follow-up Visit    CHIEF COMPLAINT AND REASON FOR VISIT:  Alcoholic liver disease.    CONSULTING HEALTHCARE PROVIDER:  Joel Wegener, MD     SUBJECTIVE:  Mr. Aguilar is a 61-year-old male with alcoholic liver disease.  His ultrasound was read as cirrhotic, but it appears that his platelets are normal, and his APRI score is not compatible with that (0.178).  His initial presentation was gastrointestinal bleeding with hematemesis and melena.  He did have an upper scope and was found to have esophageal varices.  Mr. Aguilar now is alcohol free for 8 years.  His liver improved, and his MELD score dropped to as low as 6 and is currently 7.      Today he is telling me that his main issues are his other comorbidities, which include hypertension, and he is followed by a cardiologist.  He also had atrial fibrillation, and this has resolved, and he is on Eliquis for that.  He also had a fall and had injured his knee.  He developed a wound with cellulitis, and he is now on antibiotics for the last 2 weeks.  Otherwise, he does not have any jaundice, no edema.  He did intentionally lose weight around 50 pounds.  He eats salt free.  He does not show any signs of encephalopathy, like lethargy or confusion, and did not have any further bleeding after his initial presentation almost 8 years ago.  He also does not have any abdominal pain, nausea or vomiting now, but 2 weeks ago, he had a couple of episodes of vomiting, which he attributed to starting the antibiotics.  He is also moving his bowels like twice a day with no blood in it and has no issues on that side.  Please note that the patient was vaccinated with the Moderna and has done so far  3 doses.      Medical hx Surgical hx   Past Medical History:   Diagnosis Date     Actinic keratosis     aldara     Anxiety      Cancer (H)     squamous cell skin CA     Cauda equina spinal cord injury (H)      Chronic sinusitis 5-1-16     Depressive disorder      Diastasis recti      Esophageal reflux      Esophageal varices in cirrhosis (H) 4/1/2014    Hospitalized for UGI blee 3/28/14, endoscopy revealed bleeding varices.     Essential hypertension, benign      Intermittent asthma      Mild depression (H)      Mixed hyperlipidemia      Nasal polyps 5-1-16     Osteoarthritis of lumbar spine, unspecified spinal osteoarthritis complication status      Other chronic pain      Paroxysmal atrial fibrillation (H) 9/22/2021     SCCA (squamous cell carcinoma) of skin      Seasonal allergic conjunctivitis      Type II or unspecified type diabetes mellitus without mention of complication, not stated as uncontrolled      Unspecified site of spinal cord injury without evidence of spinal bone injury     due to back surgery      Past Surgical History:   Procedure Laterality Date     ABDOMEN SURGERY  2014     BACK SURGERY  August 2009     COLONOSCOPY N/A 5/12/2016    Procedure: COMBINED COLONOSCOPY, SINGLE OR MULTIPLE BIOPSY/POLYPECTOMY BY BIOPSY;  Surgeon: Ana Paula Urbina MD;  Location:  GI     ENDOSCOPY UPPER, COLONOSCOPY, COMBINED  10/19/2011    Procedure:COMBINED ENDOSCOPY UPPER, COLONOSCOPY; Upper Endoscopy, Colonoscopy with Polypectomy x4; Surgeon:AMBAR RODRÍGUEZ; Location: OR     ENT SURGERY  1-2016    Ongoing since then     ESOPHAGOSCOPY, GASTROSCOPY, DUODENOSCOPY (EGD), COMBINED  3/28/2014    Procedure: COMBINED ESOPHAGOSCOPY, GASTROSCOPY, DUODENOSCOPY (EGD);  EGD, Gastric Biopsies, Esophageal Banding;  Surgeon: Reyna Tovar MD;  Location:  OR     ESOPHAGOSCOPY, GASTROSCOPY, DUODENOSCOPY (EGD), COMBINED  6/9/2014    Procedure: COMBINED ESOPHAGOSCOPY, GASTROSCOPY, DUODENOSCOPY (EGD);   Surgeon: Curtis Mendez MD;  Location: [facility] GI     ESOPHAGOSCOPY, GASTROSCOPY, DUODENOSCOPY (EGD), COMBINED  7/24/2014    Procedure: COMBINED ESOPHAGOSCOPY, GASTROSCOPY, DUODENOSCOPY (EGD);  Surgeon: Gerard Samaniego MD;  Location: [facility] OR     ESOPHAGOSCOPY, GASTROSCOPY, DUODENOSCOPY (EGD), COMBINED N/A 10/31/2014    Procedure: COMBINED ESOPHAGOSCOPY, GASTROSCOPY, DUODENOSCOPY (EGD);  Surgeon: Gerard Samaniego MD;  Location: [facility] OR     ESOPHAGOSCOPY, GASTROSCOPY, DUODENOSCOPY (EGD), COMBINED N/A 5/12/2016    Procedure: COMBINED ESOPHAGOSCOPY, GASTROSCOPY, DUODENOSCOPY (EGD);  Surgeon: Ana Paula Urbina MD;  Location: [facility] GI     ESOPHAGOSCOPY, GASTROSCOPY, DUODENOSCOPY (EGD), COMBINED N/A 8/2/2018    Procedure: COMBINED ESOPHAGOSCOPY, GASTROSCOPY, DUODENOSCOPY (EGD);  EGD;  Surgeon: Yu Wagner MD;  Location: [facility] GI     HCL SQUAMOUS CELL CARCINOMA AG  10/07    left forearm     HERNIORRHAPHY UMBILICAL  11/8/2012    Procedure: HERNIORRHAPHY UMBILICAL;  Open Umbilical Hernia Repair With Mesh ;  Surgeon: Chase Nicholson MD;  Location: [facility] OR     INSERT STIMULATOR DORSAL COLUMN N/A 6/28/2018    Procedure: INSERT STIMULATOR DORSAL COLUMN;;  Surgeon: Elvis Sauceda MD;  Location: [facility] OR     neuro stimulator  2010     REMOVE GENERATOR STIMULATOR (LOCATION) N/A 6/28/2018    Procedure: REMOVE GENERATOR STIMULATOR (LOCATION);  Spinal Cord Stimulator and IPG Explant and Re-Implant of SCS System (Leads and IPG);  Surgeon: Elvis Sauceda MD;  Location: [facility] OR     REPAIR TENDON ACHILLES Right 11/11/2020    Procedure: Right achilles debridement and partial calcaneus excision;  Surgeon: Eh Sandoval MD;  Location: [facility] [place]     SURGICAL HISTORY OF -   1/02    abcess tooth     SURGICAL HISTORY OF -   1999    L4-5 laminectomy, cauda equina syndrome     SURGICAL HISTORY OF -   +    herniated disk repair     TONSILLECTOMY  10 1964     TRANSPOSITION ULNAR  NERVE (ELBOW)      right     ZC APPENDECTOMY  1974          Medications  Prior to Admission medications    Medication Sig Start Date End Date Taking? Authorizing Provider   ACE/ARB NOT PRESCRIBED, INTENTIONAL, ACE & ARB not prescribed due to Allergy 8/2/12  Yes Ksenia Bean APRN CNP   albuterol (PROAIR HFA/PROVENTIL HFA/VENTOLIN HFA) 108 (90 Base) MCG/ACT inhaler INHALE 2 PUFFS BY MOUTH EVERY 6 HOURS AS NEEDED FOR SHORTNESS OF BREATH/DYSPNEA OR WHEEZING 8/1/22  Yes Wegener, Joel Daniel Irwin, MD   ammonium lactate (LAC-HYDRIN) 12 % external cream Apply topically 2 times daily as needed for dry skin 3/31/22  Yes Wegener, Joel Daniel Irwin, MD   amoxicillin-clavulanate (AUGMENTIN) 875-125 MG tablet Take 1 tablet by mouth 2 times daily 7/22/22  Yes Wegener, Joel Daniel Irwin, MD   apixaban ANTICOAGULANT (ELIQUIS) 5 MG tablet Take 1 tablet (5 mg) by mouth 2 times daily 9/22/21  Yes Wegener, Joel Daniel Irwin, MD   baclofen (LIORESAL) 10 MG tablet TAKE 2 TABLETS BY MOUTH THREE TIMES DAILY 4/22/22  Yes Wegener, Joel Daniel Irwin, MD   blood glucose (NO BRAND SPECIFIED) test strip Use to test blood sugar 2 times daily or as directed.(freestyle kevin) 7/15/22  Yes Wegener, Joel Daniel Irwin, MD   blood glucose calibration (FREESTYLE CONTROL) solution Use to calibrate blood glucose monitor as directed. 7/15/22  Yes Wegener, Joel Daniel Irwin, MD   blood glucose monitoring (FREESTYLE) lancets Use to test blood sugar two times daily. 7/15/22  Yes Wegener, Joel Daniel Irwin, MD   blood glucose monitoring (NO BRAND SPECIFIED) meter device kit Use to test blood sugar 2 times daily or as directed.(freestyle kevin) 7/15/22  Yes Wegener, Joel Daniel Irwin, MD   insulin glargine (BASAGLAR KWIKPEN) 100 UNIT/ML pen ADMINISTER 26 UNITS UNDER THE SKIN DAILY 3/14/22  Yes Wegener, Joel Daniel Irwin, MD   medical cannabis (Patient's own supply) See Admin Instructions (The purpose of this order is to document that the patient  reports taking medical cannabis.  This is not a prescription, and is not used to certify that the patient has a qualifying medical condition.)   Yes Reported, Patient   potassium chloride ER (KLOR-CON M) 20 MEQ CR tablet TAKE 1 TABLET BY MOUTH DAILY 12/6/21  Yes Wegener, Joel Daniel Irwin, MD   promethazine (PHENERGAN) 25 MG tablet TAKE 1 TABLET(25 MG) BY MOUTH THREE TIMES DAILY AS NEEDED FOR NAUSEA 7/13/22  Yes Wegener, Joel Daniel Irwin, MD   propranolol (INDERAL) 40 MG tablet Take 1 tablet (40 mg) by mouth 3 times daily 11/1/21  Yes Markus Fox MD   rosuvastatin (CRESTOR) 20 MG tablet TAKE 1 TABLET (20 MG) BY MOUTH DAILY 5/12/22  Yes Wegener, Joel Daniel Irwin, MD   sertraline (ZOLOFT) 100 MG tablet TAKE 2 TABLETS BY MOUTH EVERY DAY 4/6/22  Yes Wegener, Joel Daniel Irwin, MD   triamterene-HCTZ (DYAZIDE) 37.5-25 MG capsule TAKE 1 CAPSULE BY MOUTH EVERY MORNING 3/1/22  Yes Markus Fox MD   blood glucose (ACCU-CHEK KARISHMA PLUS) test strip USE TO TEST BLOOD SUGARS ONCE DAILY  Patient not taking: No sig reported 9/5/19   Wegener, Joel Daniel Irwin, MD   clindamycin (CLEOCIN) 150 MG capsule OPEN 1 CAP AND PLACE IN 1 LITER OF NORMAL SALINE & MIX. IRRIGATE WITH 20ML EACH NOSTRIL 4 TIMES/DAY  Patient not taking: No sig reported 12/23/21   Wegener, Joel Daniel Irwin, MD   GLOBAL EASE INJECT PEN NEEDLES 32G X 4 MM miscellaneous USE AS DIRECTED EVERY DAY  Patient not taking: No sig reported 9/29/21   Wegener, Joel Daniel Irwin, MD       Allergies  Allergies   Allergen Reactions     Levaquin Anaphylaxis and Rash     Swelling in lip and tongue felt thick     Lisinopril Anaphylaxis     Swollen tongue; inability to swallow; drooling; hives; swollen face, neck, angioedema     Acetaminophen      Hx of cirrhosis      Amlodipine      Swelling, hives, possible angioedema       Morphine      b/p dropped and arms went numb  Hypotension     Quinolones Hives     Spironolactone Unknown     Pt believes himself to be allergic to it;  cannot find his old records of this[facility]      Bactrim [Sulfamethoxazole W/Trimethoprim] Rash       Review of systems  A 10-point review of systems was negative    Examination  BP (!) 163/105   Pulse 61   Temp 97.6  F (36.4  C) (Oral)   Wt 96.8 kg (213 lb 4.8 oz)   SpO2 99%   BMI 29.75 kg/m    Body mass index is 29.75 kg/m .    Gen- well, NAD, A+Ox3, normal color  Lym- no palpable LAD  CVS- RRR  RS- CTA  Abd- Not distended and no shifting dullness.  Extr- hands normal, no EVA  Skin- no rash or jaundice  Neuro- no asterixis  Psych- normal mood    Laboratory  Lab Results   Component Value Date     07/16/2022     04/09/2021    POTASSIUM 3.6 07/16/2022    POTASSIUM 3.7 07/15/2022    POTASSIUM 3.9 04/09/2021    CHLORIDE 105 07/16/2022    CHLORIDE 108 07/15/2022    CHLORIDE 109 04/09/2021    CO2 24 07/16/2022    CO2 24 07/15/2022    CO2 27 04/09/2021    BUN 11.2 07/16/2022    BUN 12 07/15/2022    BUN 7 04/09/2021    CR 0.83 07/16/2022    CR 0.68 04/09/2021       Lab Results   Component Value Date    BILITOTAL 0.5 07/15/2022    BILITOTAL 0.7 04/09/2021    ALT 23 07/15/2022    ALT 27 04/09/2021    AST 15 07/15/2022    AST 14 04/09/2021    ALKPHOS 100 07/15/2022    ALKPHOS 115 04/09/2021       Lab Results   Component Value Date    ALBUMIN 4.0 07/15/2022    ALBUMIN 4.1 04/09/2021    PROTTOTAL 7.4 07/15/2022    PROTTOTAL 8.0 04/09/2021        Lab Results   Component Value Date    WBC 9.0 07/16/2022    WBC 6.5 04/09/2021    HGB 14.0 07/16/2022    HGB 14.1 04/09/2021    MCV 88 07/16/2022    MCV 85 04/09/2021     07/16/2022     04/09/2021       Lab Results   Component Value Date    INR 1.08 07/16/2022    INR 1.01 04/09/2021       Radiology    ASSESSMENT AND PLAN:  Alcohol-related chronic liver disease.  Mr. Aguilar has alcohol-related liver disease.  His initial presentation was the gastrointestinal bleeding, which was over 8 years ago when he was drinking.  Otherwise, he remained very stable and his  liver function tests completely normalized and his platelets are also normal.  We did increase the gap between his visits here and we will see him in a year.  We will order an abdominal ultrasound to check the liver.    For his other medical issues, he needs to follow up with his cardiologist and his primary care physician.  We advised him the importance of controlling his blood pressures, and he is aware of that. He will need also to do his endoscopy and we will advise that.      I spent 30 minutes on this encounter of 08/02/2022 in chart reviewing, history taking, physical examination and documentation.  I spent some of the time also in coordination of care and counseling.      Kimberly Ramirez MD  Hepatology  [facility]

## 2022-08-02 NOTE — NURSING NOTE
Chief Complaint   Patient presents with     RECHECK       BP (!) 163/105   Pulse 61   Temp 97.6  F (36.4  C) (Oral)   Wt 96.8 kg (213 lb 4.8 oz)   SpO2 99%   BMI 29.75 kg/m      Reji Antonio on 8/2/2022 at 8:21 AM

## 2022-08-03 RX ORDER — TRIAMTERENE AND HYDROCHLOROTHIAZIDE 37.5; 25 MG/1; MG/1
2 CAPSULE ORAL EVERY MORNING
Qty: 180 CAPSULE | Refills: 3 | Status: SHIPPED | OUTPATIENT
Start: 2022-08-03 | End: 2023-01-26

## 2022-08-03 NOTE — TELEPHONE ENCOUNTER
I think it would be okay this patient to take an additional dose of the blood pressure medication triamterene-hydrochlorothiazide (2 tablets daily) for now until he hears back from FLORINDA.  It will be a good idea to see if he can get on JW's schedule as he may prefer to add a different medication instead of increasing this dose long-term.  Thanks, DE

## 2022-08-03 NOTE — TELEPHONE ENCOUNTER
JW,  Please see below.  Sent pt mychart.  If you would like apt please advise where to fit pt in too.  Thanks,  Rama Morataya RN

## 2022-08-03 NOTE — TELEPHONE ENCOUNTER
DE (Dod),    Please see [employer] messages below.    Ok to wait for JW to address when he checks messages later today?    Has been having high bps and possible symptoms with last message from pt.    Please advise.  Thanks,  Rama Morataya RN

## 2022-08-08 ENCOUNTER — MYC MEDICAL ADVICE (OUTPATIENT)
Dept: FAMILY MEDICINE | Facility: CLINIC | Age: 62
End: 2022-08-08

## 2022-08-08 DIAGNOSIS — L03.116 LEFT LEG CELLULITIS: ICD-10-CM

## 2022-08-15 ENCOUNTER — OFFICE VISIT (OUTPATIENT)
Dept: ORTHOPEDICS | Facility: CLINIC | Age: 62
End: 2022-08-15

## 2022-08-15 ENCOUNTER — VIRTUAL VISIT (OUTPATIENT)
Dept: PALLIATIVE MEDICINE | Facility: CLINIC | Age: 62
End: 2022-08-15
Payer: MEDICARE

## 2022-08-15 DIAGNOSIS — G56.22 ULNAR NEURITIS, LEFT: Primary | ICD-10-CM

## 2022-08-15 DIAGNOSIS — M79.671 CHRONIC HEEL PAIN, RIGHT: Primary | ICD-10-CM

## 2022-08-15 DIAGNOSIS — G89.29 CHRONIC HEEL PAIN, RIGHT: Primary | ICD-10-CM

## 2022-08-15 PROCEDURE — 99214 OFFICE O/P EST MOD 30 MIN: CPT | Performed by: STUDENT IN AN ORGANIZED HEALTH CARE EDUCATION/TRAINING PROGRAM

## 2022-08-15 PROCEDURE — 96158 HLTH BHV IVNTJ INDIV 1ST 30: CPT | Mod: 95 | Performed by: PSYCHOLOGIST

## 2022-08-15 NOTE — LETTER
8/15/2022         RE: Erwin Aguilar  [address]  [place] [address]        Dear Colleague,    Thank you for referring your patient, Erwin Aguliar, to the [facility] [place]. Please see a copy of my visit note below.    Date of Service: Aug 15, 2022    Chief Complaint:   Chief Complaint   Patient presents with     RECHECK     Left wrist, forearm, and elbow.  Dropping things all the time.  Not seeing any improvements.       Interval: The patient returns today to further discuss left forearm achiness, heaviness and hand weakness.  He endorses aching along the ulnar nerve distribution of the medial forearm, ulnar wrist and hypothenar eminence.  He states numbness predominantly affects his small finger.  He describes his arm is feeling heavy.  He feels his hand  is weak and he recently dropped his bike.  Patient also describes pain in a radicular pattern along his posterior arm dorsal lateral/radial forearm into his first webspace.  He has been using a wrist brace and elbow extension splint at night since we last saw him 2 weeks ago.  He feels that it is preventing his symptoms from getting worse.  He continues to wake up at night but a lot of this is due to chronic pain from his low back.      History of Present Illness: Erwin Aguilar is a 61 year old, right handed male who presents today for further evaluation of left hand numbness and tingling. Numbness and tingling effects the thumb, index, long (middle), ring and small.  He notices some mild intermittent tingling of the thumb index and middle finger particularly when he wakes up in the morning.  The small and ring finger numbness and tingling is more present when riding his bike or playing his guitar.  Symptoms first began 6-12 months ago. There was not an inciting event. Symptoms DO wake the patient up at night. Symptoms made worse during the following activities: riding his electric bike, playing guitar. He  notices that he sometimes drops in guitar, and has difficulty holding on to a key to start his electric bike. Symptoms are present intermittently. The following modalities have been tried: hand therapy for nerve gliding, intermittent night extension splinting at the elbow.     Patient has had multiple orthopedic injuries and procedures of the left years including cauda equina resulting in bilateral lower extremity weakness/numbness. He uses a wheelchair for ambulation at home and a cane when out and about due to a right heel injury and chronic pain.  He rides an electric bike most days. Patient notes that he would like to avoid any surgery until the fall so he can continue riding his bike. He does have a history of right ulnar nerve transposition in his 20's.     Physical examination:  Well-developed, well-nourished and in no acute distress.  Alert and oriented to surroundings.  On examination of the left upper extremity:     Skin clean, dry and intact  Sensation:  Median: diminished  Radial: Diminished dorsal radial sensory nerve distribution  Ulnar: diminished -dorsal ulnar sensory nerve distribution is intact  Thumb opposition strength: intact  Interosseous strength: diminished  Thenar atrophy: Not Present  Interosseous atrophy: Not Present  Tinel's over carpal tunnel: Present  Tinel's over cubital tunnel: Present  Phalen's: Negative  Carpal tunnel compression: Positive  Elbow flexion compression: Positive  Negative fromets    5 out of 5 strength first dorsal interossei hand intrinsics, 5 out of 5 EPL, FPL    Two point discrimination (mm):   Median: 5 mm  Ulnar: 6 mm    EMG: EMG obtained on 7/27/22 demonstrated the following:  Results:  Left median and ulnar sensory, mixed nerve, and motor conduction studies demonstrated moderate ulnar motor conduction slowing across the elbow as well as mild slowing of median palmar conduction and marginal relative prolongation of median distal latencies. A left radial sensory  conduction study was normal.      Interpretation:  This is an abnormal study, demonstrating electrophysiologic evidence of the followin. Mild to moderate left ulnar neuropathy at the elbow.  2. 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    Assessment: 61 year old male with:  1. Mild left carpal tunnel syndrome.  2. Mild to moderated left cubital tunnel syndrome.     Plan:   I reassured the patient that his symptoms related to his peripheral nerve compression will not become catastrophically or suddenly worse.  I discussed that 2 weeks of splinting is not sufficient time to note an improvement.  If he feels that his symptoms are too aggravating, he would be a candidate for a left cubital tunnel release and possible nerve transposition.  He states that they are tolerable and would like to avoid surgery if possible.  I did discuss the possibility of an ultrasound-guided ulnar nerve injection at the cubital tunnel as well.  He also would like to hold off on that at this time and continue with splinting.    He should continue with the carpal tunnel and cubital tunnel extension splints.  Recommend anti-inflammatories as needed.     The patient does appear to have radiculopathy in the C6 nerve distribution.  As far as I can tell from our records and imaging, he has not had his cervical spine evaluated.  I suggested that he pursue this with his spine surgeon if his symptoms warrant.    The patient voices understanding and agreement.  All of his questions were answered to his satisfaction.  He will follow-up with me as needed.    Deny Dixon MD    Hand, Upper Extremity & Microvascular Surgery  Department of Orthopedic Surgery  [facility]

## 2022-08-15 NOTE — PROGRESS NOTES
"Erwin Aguilar is a 61 year old male who is being evaluated via a billable video visit.      The patient has been notified of following:     \"This video visit will be conducted via a call between you and your physician/provider. We have found that certain health care needs can be provided without the need for an in-person physical exam.  This service lets us provide the care you need with a video conversation.     Video visits are billed at different rates depending on your insurance coverage.  Please reach out to your insurance provider with any questions.    If during the course of the call the physician/provider feels a video visit is not appropriate, you will not be charged for this service.\"    Patient has given verbal consent for Video visit? Yes    Patient would like the video invitation sent by: Text to cell phone: .[phone]}    Video Start Time: 8:00 AM    Additional provider notes:     Pain Diagnoses per pain provider:   Chronic heel pain, right        DATA: During today's visit you reported the following: Your heel pain is managed - cannabis helps a lot, also depends on how much you use it. No longer having back spasms. Your mood is stable. Your activity level is stable - using e-bike. Your stress level is fairly stable - water damage from apartment leaking above, partner's health is back on track. Your sleep is stable. You reported engaging in self-care for your pain 2-3 times daily.    You identified that you would like to focus on the following or had questions regarding the following issues or concerns, and we discussed the following:   - water leak from above, no damage to property, however will require repair  - go to surgeon today after completing hand PT - carpal tunnel and ulnar nerve entrapment  - blood pressure out of whack - seeing cardiologist tomorrow, allergic to most medications that manage BP  - worry thoughts about surgery and sedation/anaesthesia     ASSESSMENT: Overall Erwin continues to " have good pain management and good awareness of triggers and self-care strategies. He seems to still benefit from periodic check-ins due to some variability in his stress level in the past year, which is a known pain trigger.    PLAN:   Your next appointment is scheduled for 10/3 at 8:00 AM. May schedule earlier if needed.  Assignment/Objectives /interventions for next session:   - continue to focus on present moment to prevent worry thoughts from becoming overwhelming  - continue to ride e-bike as often as you are able  - continue to engage in self-care as often as needed    We believe regular attendance is key to your success in our program!      Any time you are unable to keep your appointment we ask that you call us at [phone] at least 24 hours in advance to cancel.This will allow us to offer the appointment time to another patient.     Multiple missed appointments may lead to dismissal from the clinic.    Video-Visit Details    Type of service:  Video Visit    Video End Time (time video stopped): 8:22 AM    Originating Location (pt. Location): Home    Distant Location (provider location):  [facility]     Mode of Communication:  Video Conference via Imer Faye PsyD LP  Licensed Psychologist  Outpatient Clinic Therapist  [facility]

## 2022-08-15 NOTE — NURSING NOTE
Reason For Visit:   Chief Complaint   Patient presents with     RECHECK     Left wrist, forearm, and elbow.  Dropping things all the time.  Not seeing any improvements.       Primary MD: Wegener, Joel Daniel Irwin  Ref. MD: Nisha    Age: 61 year old    ?  No      There were no vitals taken for this visit.      Pain Assessment  Patient Currently in Pain: Yes  0-10 Pain Scale: 8  Primary Pain Location: Wrist (left wrist)  Pain Descriptors: Aching, Dull    Hand Dominance Evaluation  Hand Dominance: Right          QuickDASH Assessment  No flowsheet data found.       Current Outpatient Medications   Medication Sig Dispense Refill     ACE/ARB NOT PRESCRIBED, INTENTIONAL, ACE & ARB not prescribed due to Allergy       albuterol (PROAIR HFA/PROVENTIL HFA/VENTOLIN HFA) 108 (90 Base) MCG/ACT inhaler INHALE 2 PUFFS BY MOUTH EVERY 6 HOURS AS NEEDED FOR SHORTNESS OF BREATH/DYSPNEA OR WHEEZING 18 g 1     ammonium lactate (LAC-HYDRIN) 12 % external cream Apply topically 2 times daily as needed for dry skin 385 g 3     amoxicillin-clavulanate (AUGMENTIN) 875-125 MG tablet Take 1 tablet by mouth 2 times daily 20 tablet 0     apixaban ANTICOAGULANT (ELIQUIS) 5 MG tablet Take 1 tablet (5 mg) by mouth 2 times daily 180 tablet 3     baclofen (LIORESAL) 10 MG tablet TAKE 2 TABLETS BY MOUTH THREE TIMES DAILY 180 tablet 3     blood glucose (ACCU-CHEK KARISHMA PLUS) test strip USE TO TEST BLOOD SUGARS ONCE DAILY (Patient not taking: No sig reported) 100 strip 1     blood glucose (NO BRAND SPECIFIED) test strip Use to test blood sugar 2 times daily or as directed.(freestyle kevin) 100 strip 3     blood glucose calibration (FREESTYLE CONTROL) solution Use to calibrate blood glucose monitor as directed. 1 each 1     blood glucose monitoring (FREESTYLE) lancets Use to test blood sugar two times daily. 180 each 3     blood glucose monitoring (NO BRAND SPECIFIED) meter device kit Use to test blood sugar 2 times daily or as  directed.(freestyle kevin) 1 kit 0     clindamycin (CLEOCIN) 150 MG capsule OPEN 1 CAP AND PLACE IN 1 LITER OF NORMAL SALINE & MIX. IRRIGATE WITH 20ML EACH NOSTRIL 4 TIMES/DAY (Patient not taking: No sig reported) 40 capsule 2     GLOBAL EASE INJECT PEN NEEDLES 32G X 4 MM miscellaneous USE AS DIRECTED EVERY DAY (Patient not taking: No sig reported) 100 each 2     insulin glargine (BASAGLAR KWIKPEN) 100 UNIT/ML pen ADMINISTER 26 UNITS UNDER THE SKIN DAILY 30 mL 2     medical cannabis (Patient's own supply) See Admin Instructions (The purpose of this order is to document that the patient reports taking medical cannabis.  This is not a prescription, and is not used to certify that the patient has a qualifying medical condition.)       potassium chloride ER (KLOR-CON M) 20 MEQ CR tablet TAKE 1 TABLET BY MOUTH DAILY 90 tablet 2     promethazine (PHENERGAN) 25 MG tablet TAKE 1 TABLET(25 MG) BY MOUTH THREE TIMES DAILY AS NEEDED FOR NAUSEA 90 tablet 4     propranolol (INDERAL) 40 MG tablet Take 1 tablet (40 mg) by mouth 3 times daily 360 tablet 3     rosuvastatin (CRESTOR) 20 MG tablet TAKE 1 TABLET (20 MG) BY MOUTH DAILY 30 tablet 5     sertraline (ZOLOFT) 100 MG tablet TAKE 2 TABLETS BY MOUTH EVERY  tablet 1     triamterene-HCTZ (DYAZIDE) 37.5-25 MG capsule Take 2 capsules by mouth every morning 180 capsule 3       Allergies   Allergen Reactions     Levaquin Anaphylaxis and Rash     Swelling in lip and tongue felt thick     Lisinopril Anaphylaxis     Swollen tongue; inability to swallow; drooling; hives; swollen face, neck, angioedema     Acetaminophen      Hx of cirrhosis      Amlodipine      Swelling, hives, possible angioedema       Morphine      b/p dropped and arms went numb  Hypotension     Quinolones Hives     Spironolactone Unknown     Pt believes himself to be allergic to it; cannot find his old records of this.-mjc      Bactrim [Sulfamethoxazole W/Trimethoprim] ULANA Hathaway, ATC
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
## 2022-08-16 ENCOUNTER — OFFICE VISIT (OUTPATIENT)
Dept: CARDIOLOGY | Facility: CLINIC | Age: 62
End: 2022-08-16
Attending: INTERNAL MEDICINE
Payer: MEDICARE

## 2022-08-16 VITALS
DIASTOLIC BLOOD PRESSURE: 82 MMHG | WEIGHT: 212.8 LBS | HEART RATE: 68 BPM | BODY MASS INDEX: 29.79 KG/M2 | SYSTOLIC BLOOD PRESSURE: 158 MMHG | RESPIRATION RATE: 16 BRPM | HEIGHT: 71 IN

## 2022-08-16 DIAGNOSIS — I10 HYPERTENSION GOAL BP (BLOOD PRESSURE) < 140/90: Primary | ICD-10-CM

## 2022-08-16 PROCEDURE — 99214 OFFICE O/P EST MOD 30 MIN: CPT | Performed by: INTERNAL MEDICINE

## 2022-08-16 RX ORDER — DOXAZOSIN 1 MG/1
1 TABLET ORAL AT BEDTIME
Qty: 90 TABLET | Refills: 3 | Status: SHIPPED | OUTPATIENT
Start: 2022-08-16 | End: 2022-08-31
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
## 2022-08-16 NOTE — PATIENT INSTRUCTIONS
Erwin Aguilar,    It was a pleasure to see you today at the [facility].     My recommendations after this visit include:    Start cardura for BP   Call me in 2 weeks with BP readings    CARITO Fox MD, FACC, [facility]

## 2022-08-31 ENCOUNTER — MYC MEDICAL ADVICE (OUTPATIENT)
Dept: CARDIOLOGY | Facility: CLINIC | Age: 62
End: 2022-08-31

## 2022-08-31 DIAGNOSIS — I10 HYPERTENSION GOAL BP (BLOOD PRESSURE) < 140/90: ICD-10-CM

## 2022-08-31 RX ORDER — DOXAZOSIN 8 MG/1
8 TABLET ORAL AT BEDTIME
Qty: 90 TABLET | Refills: 0 | Status: SHIPPED | OUTPATIENT
Start: 2022-08-31 | End: 2022-12-06

## 2022-08-31 NOTE — TELEPHONE ENCOUNTER
Markus Fox MD Caswell, Mallory J, RN  We should increase dose of doxazosin to 8 mg a day         ==noted. Chart reviewed and mychart messages. He is up to 4 mg on doxazosin currently. He will go up to 8 mg. He has 1 mg tablets and just refilled a 90 day supply. He would prefer to use these up and writer cautioned him on this to make sure he takes the correct dosage and then to not  and accidentally double up with the 8 mg tablets. He repeated back instruction.

## 2022-09-07 ENCOUNTER — ANCILLARY PROCEDURE (OUTPATIENT)
Dept: ULTRASOUND IMAGING | Facility: CLINIC | Age: 62
End: 2022-09-07
Attending: INTERNAL MEDICINE
Payer: MEDICARE

## 2022-09-07 DIAGNOSIS — K70.30 ALCOHOLIC CIRRHOSIS OF LIVER WITHOUT ASCITES (H): ICD-10-CM

## 2022-09-07 PROBLEM — M79.642 PAIN OF LEFT HAND: Status: RESOLVED | Noted: 2022-06-11 | Resolved: 2022-09-07

## 2022-09-07 PROBLEM — R20.2 PARESTHESIAS: Status: RESOLVED | Noted: 2022-06-11 | Resolved: 2022-09-07

## 2022-09-07 PROBLEM — M79.622 PAIN OF LEFT UPPER ARM: Status: RESOLVED | Noted: 2022-06-11 | Resolved: 2022-09-07

## 2022-09-07 PROBLEM — M79.632 PAIN OF LEFT FOREARM: Status: RESOLVED | Noted: 2022-06-11 | Resolved: 2022-09-07

## 2022-09-07 PROBLEM — M79.645 PAIN OF FINGER OF LEFT HAND: Status: RESOLVED | Noted: 2022-06-11 | Resolved: 2022-09-07

## 2022-09-07 PROCEDURE — 76700 US EXAM ABDOM COMPLETE: CPT | Performed by: RADIOLOGY

## 2022-09-20 ENCOUNTER — MYC MEDICAL ADVICE (OUTPATIENT)
Dept: FAMILY MEDICINE | Facility: CLINIC | Age: 62
End: 2022-09-20

## 2022-09-20 DIAGNOSIS — I48.0 PAROXYSMAL ATRIAL FIBRILLATION (H): ICD-10-CM

## 2022-09-20 DIAGNOSIS — M62.830 BACK MUSCLE SPASM: ICD-10-CM

## 2022-09-20 DIAGNOSIS — M62.838 MUSCLE SPASMS OF BOTH LOWER EXTREMITIES: ICD-10-CM

## 2022-09-20 RX ORDER — BACLOFEN 10 MG/1
TABLET ORAL
Qty: 180 TABLET | Refills: 3 | Status: SHIPPED | OUTPATIENT
Start: 2022-09-20 | End: 2023-03-05

## 2022-09-20 RX ORDER — APIXABAN 5 MG/1
TABLET, FILM COATED ORAL
Qty: 60 TABLET | Refills: 0 | Status: SHIPPED | OUTPATIENT
Start: 2022-09-20 | End: 2022-11-22

## 2022-09-20 NOTE — TELEPHONE ENCOUNTER
Baclofen:  Routing refill request to provider for review/approval because:  Drug not on the [facility] refill protocol   Randi BARRIOS RN

## 2022-09-20 NOTE — TELEPHONE ENCOUNTER
Eliquis:  Prescription approved per [facility] Refill Protocol.  1 month refill only; patient due for appointment (followup)    Randi BARRIOS RN

## 2022-09-27 ENCOUNTER — MYC MEDICAL ADVICE (OUTPATIENT)
Dept: FAMILY MEDICINE | Facility: CLINIC | Age: 62
End: 2022-09-27

## 2022-09-28 DIAGNOSIS — I10 HYPERTENSION GOAL BP (BLOOD PRESSURE) < 140/90: ICD-10-CM

## 2022-09-28 DIAGNOSIS — F41.1 GENERALIZED ANXIETY DISORDER: ICD-10-CM

## 2022-09-29 ENCOUNTER — MYC MEDICAL ADVICE (OUTPATIENT)
Dept: FAMILY MEDICINE | Facility: CLINIC | Age: 62
End: 2022-09-29

## 2022-09-29 RX ORDER — SERTRALINE HYDROCHLORIDE 100 MG/1
TABLET, FILM COATED ORAL
Qty: 180 TABLET | Refills: 3 | Status: SHIPPED | OUTPATIENT
Start: 2022-09-29 | End: 2023-01-26

## 2022-09-29 RX ORDER — PROPRANOLOL HYDROCHLORIDE 40 MG/1
TABLET ORAL
Qty: 360 TABLET | Refills: 1 | Status: SHIPPED | OUTPATIENT
Start: 2022-09-29 | End: 2023-01-26

## 2022-09-29 ASSESSMENT — PATIENT HEALTH QUESTIONNAIRE - PHQ9
SUM OF ALL RESPONSES TO PHQ QUESTIONS 1-9: 2
SUM OF ALL RESPONSES TO PHQ QUESTIONS 1-9: 2
10. IF YOU CHECKED OFF ANY PROBLEMS, HOW DIFFICULT HAVE THESE PROBLEMS MADE IT FOR YOU TO DO YOUR WORK, TAKE CARE OF THINGS AT HOME, OR GET ALONG WITH OTHER PEOPLE: NOT DIFFICULT AT ALL

## 2022-09-29 NOTE — TELEPHONE ENCOUNTER
Sertraline:  Routing refill request to provider for review/approval because:  Due for PHQ9 - sent to pt via MyChart    Propranolol:  Routing refill request to provider for review/approval because:  Drug not on the [facility] refill protocol   Last Rx from outside provider    Randi BARRIOS RN

## 2022-10-03 ENCOUNTER — VIRTUAL VISIT (OUTPATIENT)
Dept: PALLIATIVE MEDICINE | Facility: CLINIC | Age: 62
End: 2022-10-03
Payer: MEDICARE

## 2022-10-03 DIAGNOSIS — G89.29 CHRONIC HEEL PAIN, RIGHT: Primary | ICD-10-CM

## 2022-10-03 DIAGNOSIS — M79.671 CHRONIC HEEL PAIN, RIGHT: Primary | ICD-10-CM

## 2022-10-03 PROCEDURE — 96158 HLTH BHV IVNTJ INDIV 1ST 30: CPT | Mod: 95 | Performed by: PSYCHOLOGIST

## 2022-10-03 NOTE — PROGRESS NOTES
"Erwin Aguilar is a 61 year old male who is being evaluated via a billable video visit.      The patient has been notified of following:     \"This video visit will be conducted via a call between you and your physician/provider. We have found that certain health care needs can be provided without the need for an in-person physical exam.  This service lets us provide the care you need with a video conversation.     Video visits are billed at different rates depending on your insurance coverage.  Please reach out to your insurance provider with any questions.    If during the course of the call the physician/provider feels a video visit is not appropriate, you will not be charged for this service.\"    Patient has given verbal consent for Video visit? Yes    Patient is currently in the [facility]? yes    Patient would like the video invitation sent by: Text to cell phone: [phone]}    Video Start Time: 8:01 AM    Additional provider notes:     Pain Diagnoses per pain provider:   Chronic heel pain, right            DATA: During today's visit you reported the following: Your heel pain remains well-managed. Your mood is overall stable. Your activity level is stable. Your stress level is mildly worse - your health and partner's health concerns. Your sleep is unchanged, still struggling with waking up 4-5 times per night - not related to pain. You reported engaging in self-care for your pain 2-3 times daily.    You identified that you would like to focus on the following or had questions regarding the following issues or concerns, and we discussed the following:   - elbow surgery is in 'limbo' - not 'bad' enough for surgery - brace immobilizes your arm too much while riding bike  - worried about brace causing atrophy   - blood pressure is 'out of whack', Apple Watch informed you were in a-fib  - partner having several health issues on top of long covid which are concerning  - overall heel pain is " well-managed    ASSESSMENT: Heel pain remains well managed and mood remains overall stable as well.    PLAN:   Your next appointment is scheduled for 1/2 at 8:00 AM.  Assignment/Objectives /interventions for next session:   - continue to focus on self-care    We believe regular attendance is key to your success in our program!      Any time you are unable to keep your appointment we ask that you call us at [phone] at least 24 hours in advance to cancel.This will allow us to offer the appointment time to another patient.     Multiple missed appointments may lead to dismissal from the clinic.    Video-Visit Details    Type of service:  Video Visit    Video End Time (time video stopped): 8:32 AM    Originating Location (pt. Location): Home    Distant Location (provider location):  [facility]     Mode of Communication:  Video Conference via Imer Faye PsyD LP  Licensed Psychologist  Outpatient Clinic Therapist  [facility]

## 2022-10-09 ENCOUNTER — HEALTH MAINTENANCE LETTER (OUTPATIENT)
Age: 62
End: 2022-10-09

## 2022-10-17 ENCOUNTER — MYC MEDICAL ADVICE (OUTPATIENT)
Dept: FAMILY MEDICINE | Facility: CLINIC | Age: 62
End: 2022-10-17

## 2022-10-17 DIAGNOSIS — Z79.4 TYPE 2 DIABETES MELLITUS WITHOUT COMPLICATION, WITH LONG-TERM CURRENT USE OF INSULIN (H): ICD-10-CM

## 2022-10-17 DIAGNOSIS — E11.9 TYPE 2 DIABETES MELLITUS WITHOUT COMPLICATION, WITH LONG-TERM CURRENT USE OF INSULIN (H): ICD-10-CM

## 2022-10-21 ENCOUNTER — LAB (OUTPATIENT)
Dept: LAB | Facility: CLINIC | Age: 62
End: 2022-10-21
Payer: MEDICARE

## 2022-10-21 DIAGNOSIS — I10 HYPERTENSION GOAL BP (BLOOD PRESSURE) < 140/90: ICD-10-CM

## 2022-10-21 DIAGNOSIS — E11.9 TYPE 2 DIABETES MELLITUS WITHOUT COMPLICATION, WITH LONG-TERM CURRENT USE OF INSULIN (H): ICD-10-CM

## 2022-10-21 DIAGNOSIS — E78.5 HYPERLIPIDEMIA LDL GOAL <100: ICD-10-CM

## 2022-10-21 DIAGNOSIS — Z79.4 TYPE 2 DIABETES MELLITUS WITHOUT COMPLICATION, WITH LONG-TERM CURRENT USE OF INSULIN (H): ICD-10-CM

## 2022-10-21 LAB
ALBUMIN SERPL BCG-MCNC: 4.4 G/DL (ref 3.5–5.2)
ALP SERPL-CCNC: 99 U/L (ref 40–129)
ALT SERPL W P-5'-P-CCNC: 15 U/L (ref 10–50)
ANION GAP SERPL CALCULATED.3IONS-SCNC: 9 MMOL/L (ref 7–15)
AST SERPL W P-5'-P-CCNC: 22 U/L (ref 10–50)
BILIRUB SERPL-MCNC: 0.4 MG/DL
BUN SERPL-MCNC: 11.8 MG/DL (ref 8–23)
CALCIUM SERPL-MCNC: 9.5 MG/DL (ref 8.8–10.2)
CHLORIDE SERPL-SCNC: 99 MMOL/L (ref 98–107)
CHOLEST SERPL-MCNC: 176 MG/DL
CREAT SERPL-MCNC: 0.75 MG/DL (ref 0.67–1.17)
DEPRECATED HCO3 PLAS-SCNC: 27 MMOL/L (ref 22–29)
GFR SERPL CREATININE-BSD FRML MDRD: >90 ML/MIN/1.73M2
GLUCOSE SERPL-MCNC: 123 MG/DL (ref 70–99)
HBA1C MFR BLD: 5.9 % (ref 0–5.6)
HDLC SERPL-MCNC: 47 MG/DL
LDLC SERPL CALC-MCNC: 105 MG/DL
NONHDLC SERPL-MCNC: 129 MG/DL
POTASSIUM SERPL-SCNC: 3.6 MMOL/L (ref 3.4–5.3)
PROT SERPL-MCNC: 7.2 G/DL (ref 6.4–8.3)
SODIUM SERPL-SCNC: 135 MMOL/L (ref 136–145)
TRIGL SERPL-MCNC: 120 MG/DL

## 2022-10-21 PROCEDURE — 83036 HEMOGLOBIN GLYCOSYLATED A1C: CPT

## 2022-10-21 PROCEDURE — 36415 COLL VENOUS BLD VENIPUNCTURE: CPT

## 2022-10-21 PROCEDURE — 80053 COMPREHEN METABOLIC PANEL: CPT

## 2022-10-21 PROCEDURE — 80061 LIPID PANEL: CPT

## 2022-11-08 ENCOUNTER — MYC MEDICAL ADVICE (OUTPATIENT)
Dept: FAMILY MEDICINE | Facility: CLINIC | Age: 62
End: 2022-11-08

## 2022-11-26 ENCOUNTER — HEALTH MAINTENANCE LETTER (OUTPATIENT)
Age: 62
End: 2022-11-26

## 2022-11-29 ENCOUNTER — MYC MEDICAL ADVICE (OUTPATIENT)
Dept: FAMILY MEDICINE | Facility: CLINIC | Age: 62
End: 2022-11-29

## 2022-12-22 ENCOUNTER — MYC MEDICAL ADVICE (OUTPATIENT)
Dept: FAMILY MEDICINE | Facility: CLINIC | Age: 62
End: 2022-12-22

## 2022-12-27 DIAGNOSIS — I48.0 PAROXYSMAL ATRIAL FIBRILLATION (H): ICD-10-CM

## 2022-12-27 NOTE — TELEPHONE ENCOUNTER
Dr. Wegener-Please review and advise.    Writer responded as per below for clarification on Propranolol dosing as two different instructions on active med list.    Thank you!  FLORIN EscobarN, RN    
Propranolol pended per patient's response.    Thank you!  SALONI Oliva BSN, RN    
Personal collateral

## 2022-12-28 RX ORDER — APIXABAN 5 MG/1
TABLET, FILM COATED ORAL
Qty: 60 TABLET | Refills: 0 | Status: SHIPPED | OUTPATIENT
Start: 2022-12-28 | End: 2023-01-26

## 2022-12-28 ASSESSMENT — ANXIETY QUESTIONNAIRES
7. FEELING AFRAID AS IF SOMETHING AWFUL MIGHT HAPPEN: NOT AT ALL
GAD7 TOTAL SCORE: 4
6. BECOMING EASILY ANNOYED OR IRRITABLE: NOT AT ALL
1. FEELING NERVOUS, ANXIOUS, OR ON EDGE: SEVERAL DAYS
8. IF YOU CHECKED OFF ANY PROBLEMS, HOW DIFFICULT HAVE THESE MADE IT FOR YOU TO DO YOUR WORK, TAKE CARE OF THINGS AT HOME, OR GET ALONG WITH OTHER PEOPLE?: SOMEWHAT DIFFICULT
2. NOT BEING ABLE TO STOP OR CONTROL WORRYING: SEVERAL DAYS
IF YOU CHECKED OFF ANY PROBLEMS ON THIS QUESTIONNAIRE, HOW DIFFICULT HAVE THESE PROBLEMS MADE IT FOR YOU TO DO YOUR WORK, TAKE CARE OF THINGS AT HOME, OR GET ALONG WITH OTHER PEOPLE: SOMEWHAT DIFFICULT
3. WORRYING TOO MUCH ABOUT DIFFERENT THINGS: SEVERAL DAYS
5. BEING SO RESTLESS THAT IT IS HARD TO SIT STILL: NOT AT ALL
7. FEELING AFRAID AS IF SOMETHING AWFUL MIGHT HAPPEN: NOT AT ALL
4. TROUBLE RELAXING: SEVERAL DAYS
GAD7 TOTAL SCORE: 4

## 2022-12-28 NOTE — TELEPHONE ENCOUNTER
Prescription approved per [facility] Refill Protocol.  Future Appointments 12/28/2022 - 6/26/2023      Date Visit Type Length Department Provider     1/2/2023  8:00 AM RETURN PAIN BEHAVIORAL 60 min [facility] Itzel Faye, PhD              1/2/2023 11:00 AM ANNUAL WELLNESS 30 min [facility] Wegener, Joel Daniel Irwin, MD    Location Instructions:     [facility] is in [address] of the [facility] at [address] in [place]. The building is at the intersection with [facility] and along [facility]. This is the large, blue, glass building with a sculpture on the roof; please note there is no [facility] signage on the exterior. Free lot parking is available.                 Romelia KLEIN RN

## 2023-01-02 ENCOUNTER — VIRTUAL VISIT (OUTPATIENT)
Dept: PALLIATIVE MEDICINE | Facility: CLINIC | Age: 63
End: 2023-01-02
Payer: MEDICARE

## 2023-01-02 ENCOUNTER — OFFICE VISIT (OUTPATIENT)
Dept: FAMILY MEDICINE | Facility: CLINIC | Age: 63
End: 2023-01-02
Payer: MEDICARE

## 2023-01-02 VITALS
SYSTOLIC BLOOD PRESSURE: 152 MMHG | OXYGEN SATURATION: 98 % | HEIGHT: 69 IN | TEMPERATURE: 97.9 F | DIASTOLIC BLOOD PRESSURE: 82 MMHG | BODY MASS INDEX: 31.37 KG/M2 | WEIGHT: 211.8 LBS | HEART RATE: 65 BPM | RESPIRATION RATE: 16 BRPM

## 2023-01-02 DIAGNOSIS — E78.5 HYPERLIPIDEMIA LDL GOAL <100: ICD-10-CM

## 2023-01-02 DIAGNOSIS — E11.42 TYPE 2 DIABETES MELLITUS WITH DIABETIC POLYNEUROPATHY, WITH LONG-TERM CURRENT USE OF INSULIN (H): ICD-10-CM

## 2023-01-02 DIAGNOSIS — F10.21 ALCOHOLISM IN REMISSION (H): ICD-10-CM

## 2023-01-02 DIAGNOSIS — Z00.00 ENCOUNTER FOR MEDICARE ANNUAL WELLNESS EXAM: Primary | ICD-10-CM

## 2023-01-02 DIAGNOSIS — Z79.4 TYPE 2 DIABETES MELLITUS WITH DIABETIC POLYNEUROPATHY, WITH LONG-TERM CURRENT USE OF INSULIN (H): ICD-10-CM

## 2023-01-02 DIAGNOSIS — M65.28 CALCIFIC ACHILLES TENDINITIS: ICD-10-CM

## 2023-01-02 DIAGNOSIS — G89.29 CHRONIC HEEL PAIN, RIGHT: Primary | ICD-10-CM

## 2023-01-02 DIAGNOSIS — M79.671 CHRONIC HEEL PAIN, RIGHT: Primary | ICD-10-CM

## 2023-01-02 DIAGNOSIS — I48.0 PAROXYSMAL ATRIAL FIBRILLATION (H): ICD-10-CM

## 2023-01-02 DIAGNOSIS — F41.1 GENERALIZED ANXIETY DISORDER: ICD-10-CM

## 2023-01-02 DIAGNOSIS — K70.30 ALCOHOLIC CIRRHOSIS OF LIVER WITHOUT ASCITES (H): ICD-10-CM

## 2023-01-02 DIAGNOSIS — I10 HYPERTENSION GOAL BP (BLOOD PRESSURE) < 130/80: ICD-10-CM

## 2023-01-02 DIAGNOSIS — I10 HYPERTENSION GOAL BP (BLOOD PRESSURE) < 140/90: ICD-10-CM

## 2023-01-02 DIAGNOSIS — M79.671 INTRACTABLE RIGHT HEEL PAIN: ICD-10-CM

## 2023-01-02 DIAGNOSIS — M21.6X1 ACQUIRED CALCANEUS DEFORMITY OF RIGHT ANKLE: ICD-10-CM

## 2023-01-02 DIAGNOSIS — F33.0 MAJOR DEPRESSIVE DISORDER, RECURRENT EPISODE, MILD (H): ICD-10-CM

## 2023-01-02 DIAGNOSIS — Z12.11 SCREEN FOR COLON CANCER: ICD-10-CM

## 2023-01-02 PROCEDURE — 96158 HLTH BHV IVNTJ INDIV 1ST 30: CPT | Mod: 95 | Performed by: PSYCHOLOGIST

## 2023-01-02 PROCEDURE — 99214 OFFICE O/P EST MOD 30 MIN: CPT | Mod: 25 | Performed by: FAMILY MEDICINE

## 2023-01-02 PROCEDURE — G0439 PPPS, SUBSEQ VISIT: HCPCS | Performed by: FAMILY MEDICINE

## 2023-01-02 PROCEDURE — 96159 HLTH BHV IVNTJ INDIV EA ADDL: CPT | Mod: 95 | Performed by: PSYCHOLOGIST

## 2023-01-02 ASSESSMENT — ASTHMA QUESTIONNAIRES
ACT_TOTALSCORE: 15
QUESTION_5 LAST FOUR WEEKS HOW WOULD YOU RATE YOUR ASTHMA CONTROL: SOMEWHAT CONTROLLED
QUESTION_4 LAST FOUR WEEKS HOW OFTEN HAVE YOU USED YOUR RESCUE INHALER OR NEBULIZER MEDICATION (SUCH AS ALBUTEROL): THREE OR MORE TIMES PER DAY
ACT_TOTALSCORE: 15
QUESTION_2 LAST FOUR WEEKS HOW OFTEN HAVE YOU HAD SHORTNESS OF BREATH: ONCE A DAY
QUESTION_1 LAST FOUR WEEKS HOW MUCH OF THE TIME DID YOUR ASTHMA KEEP YOU FROM GETTING AS MUCH DONE AT WORK, SCHOOL OR AT HOME: NONE OF THE TIME
EMERGENCY_ROOM_LAST_YEAR_TOTAL: THREE OR MORE
QUESTION_3 LAST FOUR WEEKS HOW OFTEN DID YOUR ASTHMA SYMPTOMS (WHEEZING, COUGHING, SHORTNESS OF BREATH, CHEST TIGHTNESS OR PAIN) WAKE YOU UP AT NIGHT OR EARLIER THAN USUAL IN THE MORNING: ONCE OR TWICE

## 2023-01-02 ASSESSMENT — ENCOUNTER SYMPTOMS
NAUSEA: 1
MYALGIAS: 1
DYSURIA: 0
HEMATURIA: 0
CHILLS: 0
SORE THROAT: 0
HEMATOCHEZIA: 0
EYE PAIN: 0
WEAKNESS: 0
PALPITATIONS: 0
DIZZINESS: 0
JOINT SWELLING: 1
FEVER: 0
ABDOMINAL PAIN: 0
HEADACHES: 0
SHORTNESS OF BREATH: 0
DIARRHEA: 0
ARTHRALGIAS: 1
COUGH: 0
PARESTHESIAS: 1
NERVOUS/ANXIOUS: 0
FREQUENCY: 1

## 2023-01-02 ASSESSMENT — ACTIVITIES OF DAILY LIVING (ADL)
CURRENT_FUNCTION: TRANSPORTATION REQUIRES ASSISTANCE
CURRENT_FUNCTION: BATHING REQUIRES ASSISTANCE
CURRENT_FUNCTION: HOUSEWORK REQUIRES ASSISTANCE
CURRENT_FUNCTION: LAUNDRY REQUIRES ASSISTANCE

## 2023-01-02 ASSESSMENT — PAIN SCALES - GENERAL: PAINLEVEL: EXTREME PAIN (9)

## 2023-01-02 ASSESSMENT — PATIENT HEALTH QUESTIONNAIRE - PHQ9
SUM OF ALL RESPONSES TO PHQ QUESTIONS 1-9: 4
10. IF YOU CHECKED OFF ANY PROBLEMS, HOW DIFFICULT HAVE THESE PROBLEMS MADE IT FOR YOU TO DO YOUR WORK, TAKE CARE OF THINGS AT HOME, OR GET ALONG WITH OTHER PEOPLE: NOT DIFFICULT AT ALL
SUM OF ALL RESPONSES TO PHQ QUESTIONS 1-9: 4

## 2023-01-02 NOTE — PATIENT INSTRUCTIONS
Ask referral rep about castillo referral.    Plan shingrix from pharmacy.     Planning colonoscopy in spring.     I will update medical marijuana cert when needed.      Follow up labs in 5-6 months.            Patient Education   Personalized Prevention Plan  You are due for the preventive services outlined below.  Your care team is available to assist you in scheduling these services.  If you have already completed any of these items, please share that information with your care team to update in your medical record.  Health Maintenance Due   Topic Date Due    Colorectal Cancer Screening  05/12/2019    Asthma Action Plan - yearly  09/17/2019    Hepatitis B Vaccine (1 of 3 - Risk 3-dose series) Never done    Diabetic Foot Exam  10/08/2020    ANNUAL REVIEW OF HM ORDERS  10/26/2022    Zoster (Shingles) Vaccine (2 of 2) 11/16/2022       Exercise for a Healthier Heart  You may wonder how you can improve the health of your heart. If you re thinking about exercise, you re on the right track. You don t need to become an athlete. But you do need a certain amount of brisk exercise to help strengthen your heart. If you have been diagnosed with a heart condition, your healthcare provider may advise exercise to help stabilize your condition. To help make exercise a habit, choose safe, fun activities.      Exercise with a friend. When activity is fun, you're more likely to stick with it.   Before you start  Check with your healthcare provider before starting an exercise program. This is especially important if you have not been active for a while. It's also important if you have a long-term (chronic) health problem such as heart disease, diabetes, or obesity. Or if you are at high risk for having these problems.   Why exercise?  Exercising regularly offers many healthy rewards. It can help you do all of the following:   Improve your blood cholesterol level to help prevent further heart trouble  Lower your blood pressure to help  prevent a stroke or heart attack  Control diabetes, or reduce your risk of getting this disease  Improve your heart and lung function  Reach and stay at a healthy weight  Make your muscles stronger so you can stay active  Prevent falls and fractures by slowing the loss of bone mass (osteoporosis)  Manage stress better  Reduce your blood pressure  Improve your sense of self and your body image  Exercise tips    Ease into your routine. Set small goals. Then build on them. If you are not sure what your activity level should be, talk with your healthcare provider first before starting an exercise routine.  Exercise on most days. Aim for a total of 150 minutes (2 hours and 30 minutes) or more of moderate-intensity aerobic activity each week. Or 75 minutes (1 hour and 15 minutes) or more of vigorous-intensity aerobic activity each week. Or try for a combination of both. Moderate activity means that you breathe heavier and your heart rate increases but you can still talk. Think about doing 40 minutes of moderate exercise, 3 to 4 times a week. For best results, activity should last for about 40 minutes to lower blood pressure and cholesterol. It's OK to work up to the 40-minute period over time. Examples of moderate-intensity activity are walking 1 mile in 15 minutes. Or doing 30 to 45 minutes of yard work.  Step up your daily activity level.  Along with your exercise program, try being more active the whole day. Walk instead of drive. Or park further away so that you take more steps each day. Do more household tasks or yard work. You may not be able to meet the advised mount of physical activity. But doing some moderate- or vigorous-intensity aerobic activity can help reduce your risk for heart disease. Your healthcare provider can help you figure out what is best for you.  Choose 1 or more activities you enjoy.  Walking is one of the easiest things you can do. You can also try swimming, riding a bike, dancing, or taking  an exercise class.    When to call your healthcare provider  Call your healthcare provider if you have any of these:   Chest pain or feel dizzy or lightheaded  Burning, tightness, pressure, or heaviness in your chest, neck, shoulders, back, or arms  Abnormal shortness of breath  More joint or muscle pain  A very fast or irregular heartbeat (palpitations)  [employer] last reviewed this educational content on 7/1/2019 2000-2021 [employer]. All rights reserved. This information is not intended as a substitute for professional medical care. Always follow your healthcare professional's instructions.        Activities of Daily Living    Your Health Risk Assessment indicates you have difficulties with activities of daily living such as housework, bathing, preparing meals, taking medication, etc. Please make a follow up appointment for us to address this issue in more detail.    Signs of Hearing Loss      Hearing much better with one ear can be a sign of hearing loss.   Hearing loss is a problem shared by many people. In fact, it is one of the most common health problems, particularly as people age. Most people age 65 and older have some hearing loss. By age 80, almost everyone does. Hearing loss often occurs slowly over the years. So you may not realize your hearing has gotten worse.  Have your hearing checked  Call your healthcare provider if you:  Have to strain to hear normal conversation  Have to watch other people s faces very carefully to follow what they re saying  Need to ask people to repeat what they ve said  Often misunderstand what people are saying  Turn the volume of the television or radio up so high that others complain  Feel that people are mumbling when they re talking to you  Find that the effort to hear leaves you feeling tired and irritated  Notice, when using the phone, that you hear better with one ear than the other  [employer] last reviewed this educational content on 1/1/2020     8599-8689 [employer]. All rights reserved. This information is not intended as a substitute for professional medical care. Always follow your healthcare professional's instructions.          Preventing Falls at Home  A person can fall for many reasons. Older adults may fall because reaction time slows down as we age. Your muscles and joints may get stiff, weak, or less flexible because of illness, medicines, or a physical condition.   Other health problems that make falls more likely include:   Arthritis  Dizziness or lightheadedness when you stand up (orthostatic hypotension)  History of a stroke  Dizziness  Anemia  Certain medicines taken for mental illness or to control blood pressure.  Problems with balance or gait  Bladder or urinary problems  History of falling  Changes in vision (vision impairment)  Changes in thinking skills and memory (cognitive impairment)  Injuries from a fall can include serious injuries such as broken bones, dislocated joints, internal bleeding and cuts. Injuries like these can limit your independence.   Prevention tips  To help prevent falls and fall-related injuries, follow the tips below.    Floors  To make floors safer:   Put nonskid pads under area rugs.  Remove small rugs.  Replace worn floor coverings.  Tack carpets firmly to each step on carpeted stairs. Put nonskid strips on the edges of uncarpeted stairs.  Keep floors and stairs free of clutter and cords.  Arrange furniture so there are clear pathways.  Clean up any spills right away.  Bathrooms    To make bathrooms safer:   Install grab bars in the tub or shower.  Apply nonskid strips or put a nonskid rubber mat in the tub or shower.  Sit on a bath chair to bathe.  Use bathmats with nonskid backing.  Lighting  To improve visibility in your home:    Keep a flashlight in each room. Or put a lamp next to the bed within easy reach.  Put nightlights in the bedrooms, hallways, kitchen, and bathrooms.  Make sure all  stairways have good lighting.  Take your time when going up and down stairs.  Put handrails on both sides of stairs and in walkways for more support. To prevent injury to your wrist or arm, don t use handrails to pull yourself up.  Install grab bars to pull yourself up.  Move or rearrange items that you use often. This will make them easier to find or reach.  Look at your home to find any safety hazards. Especially look at doorways, walkways, and the driveway. Remove or repair any safety problems that you find.  Other changes to make  Look around to find any safety hazards. Look closely at doorways, walkways, and the driveway. Remove or repair any safety problems that you find.  Wear shoes that fit well.  Take your time when going up and down stairs.  Put handrails on both sides of stairs and in walkways for more support. To prevent injury to your wrist or arm, don t use handrails to pull yourself up.  Install grab bars wherever needed to pull yourself up.  Arrange items that you use often. This will make them easier to find or reach.    [employer] last reviewed this educational content on 3/1/2020    0078-3711 [employer]. All rights reserved. This information is not intended as a substitute for professional medical care. Always follow your healthcare professional's instructions.

## 2023-01-02 NOTE — PROGRESS NOTES
"SUBJECTIVE:   Erwin is a 62 year old who presents for Preventive Visit.    Patient has been advised of split billing requirements and indicates understanding: Yes  Are you in the first 12 months of your Medicare coverage?  No  Answers for HPI/ROS submitted by the patient on 1/2/2023  If you checked off any problems, how difficult have these problems made it for you to do your work, take care of things at home, or get along with other people?: Not difficult at all  PHQ9 TOTAL SCORE: 4      Healthy Habits:     In general, how would you rate your overall health?  Good    Frequency of exercise:  None    Do you usually eat at least 4 servings of fruit and vegetables a day, include whole grains    & fiber and avoid regularly eating high fat or \"junk\" foods?  Yes    Taking medications regularly:  Yes    Ability to successfully perform activities of daily living:  Transportation requires assistance, housework requires assistance, bathing requires assistance and laundry requires assistance    Home Safety:  Lack of grab bars in the bathroom    Hearing Impairment:  Difficulty following a conversation in a noisy restaurant or crowded room, feel that people are mumbling or not speaking clearly, difficulty following dialogue in the theater, need to ask people to speak up or repeat themselves and difficulty understanding soft or whispered speech    In the past 6 months, have you been bothered by leaking of urine?  No    In general, how would you rate your overall mental or emotional health?  Good      PHQ-2 Total Score: 1    Additional concerns today:  No      Have you ever done Advance Care Planning? (For example, a Health Directive, POLST, or a discussion with a medical provider or your loved ones about your wishes): No, advance care planning information given to patient to review.  Patient plans to discuss their wishes with loved ones or provider.         Fall risk  Fallen 2 or more times in the past year?: Yes  Any fall with " injury in the past year?: Yes  Timed Up and Go Test (>13.5 is fall risk; contact physician) : 32    Cognitive Screening   1) Repeat 3 items (Leader, Season, Table)    2) Clock draw: NORMAL  3) 3 item recall: Recalls 1 object   Results: NORMAL clock, 1-2 items recalled: COGNITIVE IMPAIRMENT LESS LIKELY    Mini-CogTM Copyright [employer]. Licensed by the author for use in [facility]; reprinted with permission (soob@[facility]). All rights reserved.      Do you have sleep apnea, excessive snoring or daytime drowsiness?: no    Reviewed and updated as needed this visit by clinical staff   Tobacco  Allergies  Meds              Reviewed and updated as needed this visit by Provider                 Social History     Tobacco Use     Smoking status: Former     Packs/day: 0.00     Years: 1.00     Pack years: 0.00     Types: Cigarettes     Start date: 10/13/1983     Quit date: 1984     Years since quittin.6     Smokeless tobacco: Never   Substance Use Topics     Alcohol use: No     Alcohol/week: 0.0 standard drinks     Comment: a pint of alcohol / day - last drink was 3/28/14     If you drink alcohol do you typically have >3 drinks per day or >7 drinks per week? Not applicable    Alcohol Use 2023   Prescreen: >3 drinks/day or >7 drinks/week? Not Applicable   Prescreen: >3 drinks/day or >7 drinks/week? -   No flowsheet data found.        PROBLEMS TO ADD ON...  -------------------------------------  Main problem is right foot/heel.  No strength.  Extremely painful and sensitive to touch.  Feels out of options.  Has tried to rehab it but unable.  Affecting life.  Still feels amputation would be appropriate and improve quality of life.     Current providers sharing in care for this patient include:   Patient Care Team:  Wegener, Joel Daniel Irwin, MD as PCP - General (Family Practice)  Kathleen Sen MD as MD (Family Medicine - Sports Medicine)  Elvis Hamilton MD as MD (Family Medicine  - Sports Medicine)  Kimberly Ramirez MD as MD (Gastroenterology)  Victor M Anaya DPM as MD (Podiatry)  Elvis Sauceda MD as MD (Anesthesiology)  Kimberly Ramirez MD as Assigned Gastroenterology Provider  Wegener, Joel Daniel Irwin, MD as Assigned PCP  Breanna Sahni, RN as Registered Nurse (Wound Care)  Markus Fox MD as Assigned Heart and Vascular Provider  Gilbert Rodriguez DO as Assigned Sleep Provider  Itzel Faye, PhD as Assigned Behavioral Health Provider  Autumn Ortiz OD as Assigned Surgical Provider  Roland Bass MD as Assigned Neuroscience Provider  Deny Dixon MD as Assigned Musculoskeletal Provider    The following health maintenance items are reviewed in Epic and correct as of today:  Health Maintenance   Topic Date Due     PSA  Never done     COLORECTAL CANCER SCREENING  05/12/2019     ASTHMA ACTION PLAN  09/17/2019     HEPATITIS B IMMUNIZATION (1 of 3 - Risk 3-dose series) Never done     DIABETIC FOOT EXAM  10/08/2020     ZOSTER IMMUNIZATION (2 of 2) 11/16/2022     MICROALBUMIN  02/12/2023     A1C  04/21/2023     BMP  04/21/2023     EYE EXAM  06/23/2023     ASTHMA CONTROL TEST  07/02/2023     PHQ-9  07/02/2023     CBC  07/16/2023     CMP  10/21/2023     LIPID  10/21/2023     MEDICARE ANNUAL WELLNESS VISIT  01/02/2024     ANNUAL REVIEW OF HM ORDERS  01/02/2024     Pneumococcal Vaccine: Pediatrics (0 to 5 Years) and At-Risk Patients (6 to 64 Years) (3 - PPSV23 if available, else PCV20) 10/13/2024     ADVANCE CARE PLANNING  01/02/2028     DTAP/TDAP/TD IMMUNIZATION (3 - Td or Tdap) 10/08/2029     HEPATITIS C SCREENING  Completed     DEPRESSION ACTION PLAN  Completed     INFLUENZA VACCINE  Completed     COVID-19 Vaccine  Completed     IPV IMMUNIZATION  Aged Out     MENINGITIS IMMUNIZATION  Aged Out     HIV SCREENING  Discontinued               Review of Systems   Constitutional: Negative for chills and fever.   HENT: Negative for  "congestion, ear pain, hearing loss and sore throat.    Eyes: Negative for pain and visual disturbance.   Respiratory: Negative for cough and shortness of breath.    Cardiovascular: Positive for peripheral edema. Negative for chest pain and palpitations.   Gastrointestinal: Positive for nausea. Negative for abdominal pain, diarrhea and hematochezia.   Genitourinary: Positive for frequency and impotence. Negative for dysuria, genital sores, hematuria, penile discharge and urgency.   Musculoskeletal: Positive for arthralgias, joint swelling and myalgias.   Skin: Negative for rash.   Neurological: Positive for paresthesias. Negative for dizziness, weakness and headaches.   Psychiatric/Behavioral: Negative for mood changes. The patient is not nervous/anxious.          OBJECTIVE:   BP (!) 152/82   Pulse 65   Temp 97.9  F (36.6  C) (Temporal)   Resp 16   Ht 1.759 m (5' 9.25\")   Wt 96.1 kg (211 lb 12.8 oz)   SpO2 98%   BMI 31.05 kg/m   Estimated body mass index is 31.05 kg/m  as calculated from the following:    Height as of this encounter: 1.759 m (5' 9.25\").    Weight as of this encounter: 96.1 kg (211 lb 12.8 oz).  Physical Exam  GENERAL: healthy, alert and no distress  EYES: Eyes grossly normal to inspection, PERRL and conjunctivae and sclerae normal  HENT: ear canals and TM's normal, nose and mouth without ulcers or lesions  NECK: no adenopathy, no asymmetry, masses, or scars and thyroid normal to palpation  RESP: lungs clear to auscultation - no rales, rhonchi or wheezes  CV: regular rate and rhythm, normal S1 S2, no S3 or S4, no murmur, click or rub, no peripheral edema and peripheral pulses strong  ABDOMEN: soft, nontender, no hepatosplenomegaly, no masses and bowel sounds normal  MS: no gross musculoskeletal defects noted, no edema  SKIN: no suspicious lesions or rashes  NEURO: Normal strength and tone, mentation intact and speech normal  PSYCH: mentation appears normal, affect normal/bright  Diabetic foot " exam: reduced sensation at toes and hyperesthesia right heel        ASSESSMENT / PLAN:   (Z00.00) Encounter for Medicare annual wellness exam  (primary encounter diagnosis)  Comment:   Plan: PRIMARY CARE FOLLOW-UP SCHEDULING            (L20.956) Intractable right heel pain  Comment: discussed options.  In this case considering situation for this, calcaneus deformity, poor outcome after achilles tendinitis surgery I would recommend [facility] consultation.  We will look into possibilities with his insurance I will discuss with our referral rep and get back to him.   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   Plan:     (K70.30) Alcoholic cirrhosis of liver without ascites (H)  Comment: last ultrasond sept 2022.  Sober many years.  Follows with dr. page  Plan:     (F10.21) Alcoholism in remission (H)  Comment: as above.  Plan:     (F33.0) Major depressive disorder, recurrent episode, mild (H)  PHQ 11/18/2021 9/29/2022 1/2/2023   PHQ-9 Total Score 2 2 4   Q9: Thoughts of better off dead/self-harm past 2 weeks Not at all Not at all Not at all     Despite all these troubles states not feeling depressed/is coping.  Concordant with phq9 scores.     (E11.42,  Z79.4) Type 2 diabetes mellitus with diabetic polyneuropathy, with long-term current use of insulin (H)  Hemoglobin A1C   Date Value Ref Range Status   10/21/2022 5.9 (H) 0.0 - 5.6 % Final     Comment:     Normal <5.7%   Prediabetes 5.7-6.4%    Diabetes 6.5% or higher     Note: Adopted from ADA consensus guidelines.   02/12/2022 5.9 (H) 0.0 - 5.6 % Final     Comment:     Normal <5.7%   Prediabetes 5.7-6.4%    Diabetes 6.5% or higher     Note: Adopted from ADA consensus guidelines.   10/04/2021 5.6 0.0 - 5.6 % Final     Comment:     Normal <5.7%   Prediabetes 5.7-6.4%    Diabetes 6.5% or  higher     Note: Adopted from ADA consensus guidelines.   04/09/2021 6.0 (H) 0 - 5.6 % Final     Comment:     Normal <5.7% Prediabetes 5.7-6.4%  Diabetes 6.5% or higher - adopted from ADA   consensus guidelines.     07/20/2020 5.8 (H) 0 - 5.6 % Final     Comment:     Normal <5.7% Prediabetes 5.7-6.4%  Diabetes 6.5% or higher - adopted from ADA   consensus guidelines.     10/08/2019 6.4 (H) 0 - 5.6 % Final     Comment:     Normal <5.7% Prediabetes 5.7-6.4%  Diabetes 6.5% or higher - adopted from ADA   consensus guidelines.       Hemoglobin A1C POCT   Date Value Ref Range Status   02/01/2018 5.6 4.3 - 6.0 % Final     At goal, no changes.     (I48.0) Paroxysmal atrial fibrillation (H)  Comment: refill given.   Plan: apixaban ANTICOAGULANT (ELIQUIS ANTICOAGULANT)         5 MG tablet            (I10) Hypertension goal BP (blood pressure) < 140/90      (E78.5) Hyperlipidemia LDL goal <100  Comment:   Plan: rosuvastatin (CRESTOR) 20 MG tablet            (F41.1) GENERALIZED ANXIETY DIS  Comment:   Plan: sertraline (ZOLOFT) 100 MG tablet            (I10) Hypertension goal BP (blood pressure) < 130/80  Comment: Home bps he states are at goal.  Pain contributing.   Continue to monitor closely.   Plan: continue propranolol and triamterene-HCTZ (DYAZIDE) 37.5-25 MG capsule              Patient has been advised of split billing requirements and indicates understanding: Yes    In addition to preventive health exam and counseling 25 minutes spent on the date of the encounter doing chart review, history and exam, negotiating and explaining the plan with the patient, documentation and further activities as noted above.           COUNSELING:  Reviewed preventive health counseling, as reflected in patient instructions       Regular exercise       Healthy diet/nutrition       Colon cancer screening       The 10-year ASCVD risk score (Shea VANEGAS, et al., 2019) is: 28.5%    Values used to calculate the score:      Age: 62 years      Sex:  "Male      Is Non- : No      Diabetic: Yes      Tobacco smoker: No      Systolic Blood Pressure: 152 mmHg      Is BP treated: Yes      HDL Cholesterol: 47 mg/dL      Total Cholesterol: 176 mg/dL      BMI:   Estimated body mass index is 31.05 kg/m  as calculated from the following:    Height as of this encounter: 1.759 m (5' 9.25\").    Weight as of this encounter: 96.1 kg (211 lb 12.8 oz).   Weight management plan: Discussed healthy diet and exercise guidelines      He reports that he quit smoking about 38 years ago. His smoking use included cigarettes. He started smoking about 39 years ago. He has never used smokeless tobacco.      Appropriate preventive services were discussed with this patient, including applicable screening as appropriate for cardiovascular disease, diabetes, osteopenia/osteoporosis, and glaucoma.  As appropriate for age/gender, discussed screening for colorectal cancer, prostate cancer, breast cancer, and cervical cancer. Checklist reviewing preventive services available has been given to the patient.    Reviewed patients plan of care and provided an AVS. The Intermediate Care Plan ( asthma action plan, low back pain action plan, and migraine action plan) for Erwin meets the Care Plan requirement. This Care Plan has been established and reviewed with the Patient.          Joel Daniel Wegener, MD  [facility]    Identified Health Risks:    He is at risk for lack of exercise and has been provided with information to increase physical activity for the benefit of his well-being.  The patient reports that he has difficulty with activities of daily living.  The patient was provided with written information regarding signs of hearing loss.  He is at risk for falling and has been provided with information to reduce the risk of falling at home.  "

## 2023-01-02 NOTE — PROGRESS NOTES
Erwin Aguilar is a 62 year old male who is being evaluated via a billable video visit.      Patient is currently in the [facility]? yes    Patient would like the video invitation sent by: Text to cell phone: [phone]}    Video Start Time: 8:01 AM  Video Stop Time: 8:45 AM    Additional provider notes:     Pain Diagnoses per pain provider:   Chronic heel pain, right        DATA: During today's visit you reported the following: Your heel pain is significantly worse. Your mood is variable. Your activity level is reduced. Your stress level is worse - partner hospitalized, brother's health. Your sleep is still poor - related to stress, pain. You reported engaging in self-care for your pain 2-3 times daily.    You identified that you would like to focus on the following or had questions regarding the following issues or concerns, and we discussed the following:   - partner hospitalized for much of December  - heel pain increased - seeing PCP today and will discuss  - discussed need to re-connect with a pain provider as you have not been seen by a pain provider since seeing VANCE Huggins CNP January 2022  - not using SCS, wonder about having it removed  - not sure if you'd like to re-connect with pain clinic, frustrated with providers leaving  - not sure if you have medical cabs through insurance    ASSESSMENT: Christianas pain has increased, no known trigger, although recent stress likely contributed to this. At this time, he reports he does not want to re-establish with the pain clinic following VANCE Huggins CNP's departure, as he does not believe there is anything further they could provide. He will likely need a medical cannabis re-certification appointment with pain clinic unless his PCP can take that over.     PLAN:   Your next appointment is scheduled N/A - patient is choosing to not re-establish with pain clinic/pain MD/VANCE at this time, acknowledges this means losing access to pain  psychology at this time.  Assignment/Objectives /interventions for next session:   - reconnect with pain clinic in order to continue to have access to pain psychology  - continue to focus on taking care of yourself    We believe regular attendance is key to your success in our program!      Any time you are unable to keep your appointment we ask that you call us at [phone] at least 24 hours in advance to cancel.This will allow us to offer the appointment time to another patient.     Multiple missed appointments may lead to dismissal from the clinic.    Telemedicine Visit: The patient's condition can be safely assessed and treated via synchronous audio and visual telemedicine encounter.      Reason for Telemedicine Visit: Services only offered telehealth    Originating Site (Patient Location): Patient's home    Distant Site (Provider Location): Provider Remote Setting- Home Office    Consent:  The patient/guardian has verbally consented to: the potential risks and benefits of telemedicine (video visit) versus in person care; bill my insurance or make self-payment for services provided; and responsibility for payment of non-covered services.     Mode of Communication:  Video Conference via [place]    As the provider I attest to compliance with applicable laws and regulations related to telemedicine.      Itzel Faye PsyD LP  Licensed Psychologist  Outpatient Clinic Therapist  [facility]

## 2023-01-10 ENCOUNTER — MYC MEDICAL ADVICE (OUTPATIENT)
Dept: FAMILY MEDICINE | Facility: CLINIC | Age: 63
End: 2023-01-10

## 2023-01-10 DIAGNOSIS — M67.971 DISORDER OF RIGHT ACHILLES TENDON: ICD-10-CM

## 2023-01-10 DIAGNOSIS — M21.6X1 ACQUIRED CALCANEUS DEFORMITY OF RIGHT ANKLE: ICD-10-CM

## 2023-01-10 DIAGNOSIS — S91.301D OPEN WOUND OF RIGHT HEEL, SUBSEQUENT ENCOUNTER: ICD-10-CM

## 2023-01-10 DIAGNOSIS — M25.571 CHRONIC PAIN OF RIGHT ANKLE: Primary | ICD-10-CM

## 2023-01-10 DIAGNOSIS — G89.29 CHRONIC PAIN OF RIGHT ANKLE: Primary | ICD-10-CM

## 2023-01-11 NOTE — TELEPHONE ENCOUNTER
Referral team      I would like to coordinate having Mr. Aguilar being seen at [facility].     Can you clarify if this is possible for him and the process?     Best, Joel Wegener,MD

## 2023-01-11 NOTE — TELEPHONE ENCOUNTER
JW,    Please see [employer] message.    What is the reason for [place] referral?  Need to let referral team know.    Isela Villar RN

## 2023-01-25 ENCOUNTER — MYC MEDICAL ADVICE (OUTPATIENT)
Dept: FAMILY MEDICINE | Facility: CLINIC | Age: 63
End: 2023-01-25
Payer: MEDICARE

## 2023-01-26 RX ORDER — SERTRALINE HYDROCHLORIDE 100 MG/1
TABLET, FILM COATED ORAL
Qty: 180 TABLET | Refills: 3 | Status: SHIPPED | OUTPATIENT
Start: 2023-01-26 | End: 2024-01-09

## 2023-01-26 RX ORDER — PROPRANOLOL HYDROCHLORIDE 40 MG/1
80 TABLET ORAL 2 TIMES DAILY
Qty: 360 TABLET | Refills: 3 | Status: SHIPPED | OUTPATIENT
Start: 2023-01-26 | End: 2023-12-01

## 2023-01-26 RX ORDER — TRIAMTERENE AND HYDROCHLOROTHIAZIDE 37.5; 25 MG/1; MG/1
2 CAPSULE ORAL EVERY MORNING
Qty: 180 CAPSULE | Refills: 3 | Status: SHIPPED | OUTPATIENT
Start: 2023-01-26 | End: 2023-09-20

## 2023-01-26 RX ORDER — ROSUVASTATIN CALCIUM 20 MG/1
20 TABLET, COATED ORAL DAILY
Qty: 90 TABLET | Refills: 3 | Status: SHIPPED | OUTPATIENT
Start: 2023-01-26 | End: 2024-01-09

## 2023-02-03 ENCOUNTER — MYC MEDICAL ADVICE (OUTPATIENT)
Dept: FAMILY MEDICINE | Facility: CLINIC | Age: 63
End: 2023-02-03
Payer: MEDICARE

## 2023-02-03 DIAGNOSIS — M79.671 RIGHT FOOT PAIN: Primary | ICD-10-CM

## 2023-02-03 NOTE — TELEPHONE ENCOUNTER
JW,  Please see below [employer] message and advise.  Pended order? Not sure how to specify standing  Thanks,  Roslyn MORFIN RN

## 2023-02-15 ENCOUNTER — MYC MEDICAL ADVICE (OUTPATIENT)
Dept: FAMILY MEDICINE | Facility: CLINIC | Age: 63
End: 2023-02-15

## 2023-02-17 ENCOUNTER — ANCILLARY PROCEDURE (OUTPATIENT)
Dept: GENERAL RADIOLOGY | Facility: CLINIC | Age: 63
End: 2023-02-17
Attending: FAMILY MEDICINE
Payer: MEDICARE

## 2023-02-17 DIAGNOSIS — M79.671 RIGHT FOOT PAIN: ICD-10-CM

## 2023-02-17 PROCEDURE — 73610 X-RAY EXAM OF ANKLE: CPT | Mod: RT | Performed by: RADIOLOGY

## 2023-02-21 ENCOUNTER — MYC MEDICAL ADVICE (OUTPATIENT)
Dept: FAMILY MEDICINE | Facility: CLINIC | Age: 63
End: 2023-02-21
Payer: MEDICARE

## 2023-02-21 NOTE — TELEPHONE ENCOUNTER
JW,  Please see below [employer] message and advise.  Not sure if this needs to go through medical records?  Thanks,  Roslyn MORFIN RN

## 2023-02-27 ENCOUNTER — MYC MEDICAL ADVICE (OUTPATIENT)
Dept: FAMILY MEDICINE | Facility: CLINIC | Age: 63
End: 2023-02-27
Payer: MEDICARE

## 2023-03-03 DIAGNOSIS — M62.830 BACK MUSCLE SPASM: ICD-10-CM

## 2023-03-03 DIAGNOSIS — I10 HYPERTENSION GOAL BP (BLOOD PRESSURE) < 140/90: ICD-10-CM

## 2023-03-03 DIAGNOSIS — M62.838 MUSCLE SPASMS OF BOTH LOWER EXTREMITIES: ICD-10-CM

## 2023-03-03 RX ORDER — DOXAZOSIN 8 MG/1
8 TABLET ORAL AT BEDTIME
Qty: 90 TABLET | Refills: 1 | Status: SHIPPED | OUTPATIENT
Start: 2023-03-03 | End: 2023-10-02

## 2023-03-03 NOTE — TELEPHONE ENCOUNTER
Routing refill request to provider for review/approval because:  Drug not on the [facility] refill protocol   Roslyn HUERTAS RN

## 2023-03-05 RX ORDER — BACLOFEN 10 MG/1
TABLET ORAL
Qty: 180 TABLET | Refills: 3 | Status: SHIPPED | OUTPATIENT
Start: 2023-03-05 | End: 2023-09-20

## 2023-03-10 ENCOUNTER — MYC MEDICAL ADVICE (OUTPATIENT)
Dept: FAMILY MEDICINE | Facility: CLINIC | Age: 63
End: 2023-03-10
Payer: MEDICARE

## 2023-03-13 ENCOUNTER — TELEPHONE (OUTPATIENT)
Dept: FAMILY MEDICINE | Facility: CLINIC | Age: 63
End: 2023-03-13
Payer: MEDICARE

## 2023-03-13 NOTE — TELEPHONE ENCOUNTER
Triage,   Patient called.   States his [facility] referral for R ankle was denied.   Was told they are unable to operate on his R ankle.   Patient is wondering next steps of care.   Please advise.   Thanks!  Livia CROCKER

## 2023-03-16 ENCOUNTER — TELEPHONE (OUTPATIENT)
Dept: ANESTHESIOLOGY | Facility: CLINIC | Age: 63
End: 2023-03-16
Payer: MEDICARE

## 2023-03-16 ENCOUNTER — MYC MEDICAL ADVICE (OUTPATIENT)
Dept: FAMILY MEDICINE | Facility: CLINIC | Age: 63
End: 2023-03-16
Payer: MEDICARE

## 2023-03-16 DIAGNOSIS — M65.28 CALCIFIC ACHILLES TENDINITIS: ICD-10-CM

## 2023-03-16 DIAGNOSIS — M79.671 INTRACTABLE RIGHT HEEL PAIN: ICD-10-CM

## 2023-03-16 DIAGNOSIS — Z98.890 H/O FOOT SURGERY: ICD-10-CM

## 2023-03-16 DIAGNOSIS — M21.6X1 ACQUIRED CALCANEUS DEFORMITY OF RIGHT ANKLE: Primary | ICD-10-CM

## 2023-03-16 DIAGNOSIS — M25.571 CHRONIC PAIN OF RIGHT ANKLE: ICD-10-CM

## 2023-03-16 DIAGNOSIS — G89.29 CHRONIC PAIN OF RIGHT ANKLE: ICD-10-CM

## 2023-03-16 NOTE — TELEPHONE ENCOUNTER
[facility] Call Center    Phone Message    May a detailed message be left on voicemail: yes     Reason for Call: Other: Patient is returning phone call to schedule referral appointment. The patient has seen Dr. Montgomery in the past and has a referral for a one time consult with additional notes stating eval ankle pain/need for amputation, discuss with Dr. Wegener. Please review and advise.    Action Taken: Message routed to:  Other: [facility]    Travel Screening: Not Applicable

## 2023-03-20 NOTE — TELEPHONE ENCOUNTER
[facility] Call Center    Phone Message    May a detailed message be left on voicemail: yes     Reason for Call: Other: Pt is calling and wanting to talk with someone. Pt is calling back and would like a call back. Please call Pt back.  Pt is in pain everyday all day. Pain level is 10/10 pt stated.     Action Taken: Message routed to:  Other: [facility]    Travel Screening: Not Applicable

## 2023-03-21 NOTE — TELEPHONE ENCOUNTER
Pt saw Nishi WOODARD CNP at the [facility].    Will have their team review.    RODERICK Mcginnis-BSN  [facility] [place]

## 2023-03-22 ENCOUNTER — HOSPITAL ENCOUNTER (EMERGENCY)
Facility: CLINIC | Age: 63
Discharge: HOME | End: 2023-03-22
Attending: EMERGENCY MEDICINE | Admitting: EMERGENCY MEDICINE
Payer: MEDICARE

## 2023-03-22 VITALS
HEART RATE: 65 BPM | TEMPERATURE: 98.5 F | BODY MASS INDEX: 28.7 KG/M2 | HEIGHT: 71 IN | WEIGHT: 205 LBS | OXYGEN SATURATION: 98 % | SYSTOLIC BLOOD PRESSURE: 158 MMHG | DIASTOLIC BLOOD PRESSURE: 78 MMHG | RESPIRATION RATE: 18 BRPM

## 2023-03-22 DIAGNOSIS — M79.671 HEEL PAIN, CHRONIC, RIGHT: ICD-10-CM

## 2023-03-22 DIAGNOSIS — G89.29 HEEL PAIN, CHRONIC, RIGHT: ICD-10-CM

## 2023-03-22 PROCEDURE — 99282 EMERGENCY DEPT VISIT SF MDM: CPT | Performed by: EMERGENCY MEDICINE

## 2023-03-22 PROCEDURE — 99282 EMERGENCY DEPT VISIT SF MDM: CPT

## 2023-03-22 ASSESSMENT — ACTIVITIES OF DAILY LIVING (ADL)
ADLS_ACUITY_SCORE: 35

## 2023-03-22 NOTE — DISCHARGE INSTRUCTIONS
Please make an appointment to follow up with Your Primary Care Provider and Orthopedics Clinic (phone: [phone]) as soon as possible for another opinion regarding the pain. I would also encourage you to go back to the pain clinic to explore other options for pain control, possibly a nerve block.   Please return to the emergency department if you have fever, redness or warmth of the heel or foot, any change in your ability to move the foot or toes, increased swelling, any other concerns.

## 2023-03-22 NOTE — ED TRIAGE NOTES
Patient rupture his right achilles.  Report N/T  Pain 10/10.     Triage Assessment     Row Name 03/22/23 0935       Triage Assessment (Adult)    Airway WDL WDL       Respiratory WDL    Respiratory WDL WDL       Skin Circulation/Temperature WDL    Skin Circulation/Temperature WDL WDL       Cardiac WDL    Cardiac WDL WDL       Peripheral/Neurovascular WDL    Peripheral Neurovascular WDL WDL       Cognitive/Neuro/Behavioral WDL    Cognitive/Neuro/Behavioral WDL WDL

## 2023-03-22 NOTE — ED PROVIDER NOTES
[place] EMERGENCY DEPARTMENT ([facility])    3/22/23       ED PROVIDER NOTE      History     Chief Complaint   Patient presents with     reruputured right achilles     HPI  Erwin Aguilar is a 62 year old male with past medical history significant for DM2, HTN, HLD, paroxysmal atrial fibrillation anticoagulated on Eliquis, alcoholic cirrhosis of the liver, esophageal varices, cauda equina spinal cord injury, right Achilles insertional calcific tendinitis s/p debridement, reattachment, and partial calcaneus excision 11/11/2020 who presents to the ED for pain in the right Achilles tendon area.  Patient tells me this pain has been there for months.  He said he had an x-ray on February 17 that shows a rerupture of the Achilles tendon.  He also told me that he went to see orthopedic surgery at [place] a month ago and they told him they could not do anything for him.  Patient is somewhat inconsistent.  He told me that he has too much pain that he cannot put any weight on it, later told me he has been walking on it anyway.  Is on medical marijuana for pain, states helps with muscle relaxation, but does not do anything for his musculoskeletal pain.  He denies any new injury and denies any sensation of feeling the Achilles tendon pop; has no memory of a certain time where it might have reruptured.  Tells me he has been to see for different orthopedic providers and has been told they cannot do anything for him.  Has an appointment to follow-up with his primary care provider next month.  States he has been going to a pain clinic, but they have told him they cannot do anything more for him.    Past Medical History  Past Medical History:   Diagnosis Date     Actinic keratosis     aldara     Anxiety      Cancer (H)     squamous cell skin CA     Cauda equina spinal cord injury (H)      Chronic sinusitis 5-1-16     Depressive disorder      Diastasis recti      Esophageal reflux      Esophageal varices in cirrhosis  (H) 4/1/2014    Hospitalized for UGI blee 3/28/14, endoscopy revealed bleeding varices.     Essential hypertension, benign      Intermittent asthma      Mild depression      Mixed hyperlipidemia      Nasal polyps 5-1-16     Osteoarthritis of lumbar spine, unspecified spinal osteoarthritis complication status      Other chronic pain      Paroxysmal atrial fibrillation (H) 9/22/2021     SCCA (squamous cell carcinoma) of skin      Seasonal allergic conjunctivitis      Type II or unspecified type diabetes mellitus without mention of complication, not stated as uncontrolled      Unspecified site of spinal cord injury without evidence of spinal bone injury     due to back surgery     Past Surgical History:   Procedure Laterality Date     ABDOMEN SURGERY  2014     BACK SURGERY  August 2009     COLONOSCOPY N/A 5/12/2016    Procedure: COMBINED COLONOSCOPY, SINGLE OR MULTIPLE BIOPSY/POLYPECTOMY BY BIOPSY;  Surgeon: Ana Paula Urbina MD;  Location:  GI     ENDOSCOPY UPPER, COLONOSCOPY, COMBINED  10/19/2011    Procedure:COMBINED ENDOSCOPY UPPER, COLONOSCOPY; Upper Endoscopy, Colonoscopy with Polypectomy x4; Surgeon:AMBAR RODRÍGUEZ; Location:[facility] OR     ENT SURGERY  1-2016    Ongoing since then     ESOPHAGOSCOPY, GASTROSCOPY, DUODENOSCOPY (EGD), COMBINED  3/28/2014    Procedure: COMBINED ESOPHAGOSCOPY, GASTROSCOPY, DUODENOSCOPY (EGD);  EGD, Gastric Biopsies, Esophageal Banding;  Surgeon: Reyna Tovar MD;  Location:  OR     ESOPHAGOSCOPY, GASTROSCOPY, DUODENOSCOPY (EGD), COMBINED  6/9/2014    Procedure: COMBINED ESOPHAGOSCOPY, GASTROSCOPY, DUODENOSCOPY (EGD);  Surgeon: Curtis Mendez MD;  Location:  GI     ESOPHAGOSCOPY, GASTROSCOPY, DUODENOSCOPY (EGD), COMBINED  7/24/2014    Procedure: COMBINED ESOPHAGOSCOPY, GASTROSCOPY, DUODENOSCOPY (EGD);  Surgeon: Gerard Samaniego MD;  Location: [facility] OR     ESOPHAGOSCOPY, GASTROSCOPY, DUODENOSCOPY (EGD), COMBINED N/A 10/31/2014    Procedure: COMBINED  ESOPHAGOSCOPY, GASTROSCOPY, DUODENOSCOPY (EGD);  Surgeon: Gerard Samaniego MD;  Location: [facility] OR     ESOPHAGOSCOPY, GASTROSCOPY, DUODENOSCOPY (EGD), COMBINED N/A 5/12/2016    Procedure: COMBINED ESOPHAGOSCOPY, GASTROSCOPY, DUODENOSCOPY (EGD);  Surgeon: Ana Paula Urbina MD;  Location: [facility] GI     ESOPHAGOSCOPY, GASTROSCOPY, DUODENOSCOPY (EGD), COMBINED N/A 8/2/2018    Procedure: COMBINED ESOPHAGOSCOPY, GASTROSCOPY, DUODENOSCOPY (EGD);  EGD;  Surgeon: Yu Wagner MD;  Location: [facility] GI     HCL SQUAMOUS CELL CARCINOMA AG  10/07    left forearm     HERNIORRHAPHY UMBILICAL  11/8/2012    Procedure: HERNIORRHAPHY UMBILICAL;  Open Umbilical Hernia Repair With Mesh ;  Surgeon: Chase Nicholson MD;  Location: [facility] OR     INSERT STIMULATOR DORSAL COLUMN N/A 6/28/2018    Procedure: INSERT STIMULATOR DORSAL COLUMN;;  Surgeon: Elvis Sauceda MD;  Location: [facility] OR     neuro stimulator  2010     REMOVE GENERATOR STIMULATOR (LOCATION) N/A 6/28/2018    Procedure: REMOVE GENERATOR STIMULATOR (LOCATION);  Spinal Cord Stimulator and IPG Explant and Re-Implant of SCS System (Leads and IPG);  Surgeon: Elvis Sauceda MD;  Location: [facility] OR     REPAIR TENDON ACHILLES Right 11/11/2020    Procedure: Right achilles debridement and partial calcaneus excision;  Surgeon: Eh Sandoval MD;  Location: [facility] OR     SURGICAL HISTORY OF -   1/02    abcess tooth     SURGICAL HISTORY OF -   1999    L4-5 laminectomy, cauda equina syndrome     SURGICAL HISTORY OF -   +    herniated disk repair     TONSILLECTOMY  10 1964     TRANSPOSITION ULNAR NERVE (ELBOW)      right     ZZC APPENDECTOMY  1974     albuterol (PROAIR HFA/PROVENTIL HFA/VENTOLIN HFA) 108 (90 Base) MCG/ACT inhaler  apixaban ANTICOAGULANT (ELIQUIS ANTICOAGULANT) 5 MG tablet  baclofen (LIORESAL) 10 MG tablet  doxazosin (CARDURA) 8 MG tablet  insulin glargine (LANTUS PEN) 100 UNIT/ML pen  medical cannabis (Patient's  own supply)  promethazine (PHENERGAN) 25 MG tablet  propranolol (INDERAL) 40 MG tablet  rosuvastatin (CRESTOR) 20 MG tablet  sertraline (ZOLOFT) 100 MG tablet  triamterene-HCTZ (DYAZIDE) 37.5-25 MG capsule  ACE/ARB NOT PRESCRIBED, INTENTIONAL,  GLOBAL EASE INJECT PEN NEEDLES 32G X 4 MM miscellaneous      Allergies   Allergen Reactions     Levaquin Anaphylaxis and Rash     Swelling in lip and tongue felt thick     Lisinopril Anaphylaxis     Swollen tongue; inability to swallow; drooling; hives; swollen face, neck, angioedema     Acetaminophen      Hx of cirrhosis      Amlodipine      Swelling, hives, possible angioedema       Morphine      b/p dropped and arms went numb  Hypotension     Quinolones Hives     Spironolactone Unknown     Pt believes himself to be allergic to it; cannot find his old records of this.-mjc      Bactrim [Sulfamethoxazole W/Trimethoprim] Rash     Family History  Family History   Problem Relation Age of Onset     Cancer Mother         lung     Breast Cancer Mother      Other Cancer Mother      Cancer Father         esophogeal, GERD     Snoring Father      Diabetes Brother         amputation, Type 1     Diabetes Brother      Diabetes Brother      Cancer - colorectal No family hx of      Glaucoma No family hx of      Macular Degeneration No family hx of      Social History   Social History     Tobacco Use     Smoking status: Former     Packs/day: 0.00     Years: 1.00     Pack years: 0.00     Types: Cigarettes     Start date: 10/13/1983     Quit date: 1984     Years since quittin.8     Smokeless tobacco: Never   Vaping Use     Vaping Use: Never used   Substance Use Topics     Alcohol use: No     Alcohol/week: 0.0 standard drinks     Comment: a pint of alcohol / day - last drink was 3/28/14     Drug use: Yes     Frequency: 2.0 times per week     Types: Marijuana     Comment: medical marijuana - occasional      Past medical history, past surgical history, medications, allergies, family  "history, and social history were reviewed with the patient. No additional pertinent items.      A medically appropriate review of systems was performed with pertinent positives and negatives noted in the HPI, and all other systems negative.    Physical Exam   BP: (!) 158/78  Pulse: 65  Temp: 98.5  F (36.9  C)  Resp: 18  Height: 180.3 cm (5' 11\")  Weight: 93 kg (205 lb)  SpO2: 98 %  Physical Exam    GEN:  Well developed, no acute distress  HEENT:  EOMI, Mucous membranes are moist.   Musculoskeletal: The right heel area has in ulcer with scar tissue on the posterior heel.  There is no notable redness of the foot or ankle, no notable swelling, I am not finding any area of tenderness.  There is a normal dorsalis pedis pulse in the right foot.  He is unable to move his toes, cannot dorsiflex or plantarflex, and states that that has been unchanged for 9 years.  No calf tenderness or tenderness over the Achilles tendon area.  Neuro:  Alert and oriented X3, unchanged weakness/inability to move the toes in the right foot.  Skin:  Warm, dry    ED Course, Procedures, & Data      Procedures       We discussed options for today.  I told him I could call orthopedics to ask for consult, however, since the symptoms are constant, there is no new neurovascular deficit, no sign of acute infection, I suspect orthopedics will advise me to have him make an appointment in clinic.  There has been no acute injury, no sign of acute infection, symptoms are chronic, not emergent.  I offered the patient a prescription for pain medicine and he initially sounded interested, but later declined.         No results found for any visits on 03/22/23.  Medications - No data to display  Labs Ordered and Resulted from Time of ED Arrival to Time of ED Departure - No data to display  No orders to display          Critical care was not performed.     Medical Decision Making  The patient's presentation was of low complexity (a stable chronic illness).    The " patient's evaluation involved:  history and exam without other MDM data elements    The patient's management necessitated only low risk treatment.      Assessment & Plan    Patient presents with chronic right heel pain that seems that it did not fully heal after previous injury.  He states he has been to see orthopedics and they have told him they cannot do anything for him.  He is quite frustrated with having this chronic pain, is not sure what to do.  I advised him to follow-up with orthopedics in clinic and I offered pain medicine prescription, however, he declined the prescription.  Was given the phone number to call and schedule an appointment with orthopedics.  We also discussed possible nerve block to control pain, patient did not sound interested in this either.  There is no sign of acute infection, acute injury, acute neurovascular compromise.  No calf tenderness, no swelling, doubt DVT.    I have reviewed the nursing notes. I have reviewed the findings, diagnosis, plan and need for follow up with the patient.    New Prescriptions    No medications on file       Final diagnoses:   Heel pain, chronic, right         [facility] EMERGENCY DEPARTMENT  3/22/2023     Coco Hankins MD  03/22/23 1803

## 2023-03-23 ENCOUNTER — MYC MEDICAL ADVICE (OUTPATIENT)
Dept: FAMILY MEDICINE | Facility: CLINIC | Age: 63
End: 2023-03-23
Payer: MEDICARE

## 2023-03-24 ENCOUNTER — VIRTUAL VISIT (OUTPATIENT)
Dept: FAMILY MEDICINE | Facility: CLINIC | Age: 63
End: 2023-03-24
Payer: MEDICARE

## 2023-03-24 DIAGNOSIS — M21.6X1 ACQUIRED CALCANEUS DEFORMITY OF RIGHT ANKLE: ICD-10-CM

## 2023-03-24 DIAGNOSIS — M79.671 INTRACTABLE RIGHT HEEL PAIN: ICD-10-CM

## 2023-03-24 DIAGNOSIS — G89.4 CHRONIC PAIN SYNDROME: Primary | ICD-10-CM

## 2023-03-24 DIAGNOSIS — Z98.890 H/O FOOT SURGERY: ICD-10-CM

## 2023-03-24 DIAGNOSIS — S86.011A RUPTURE OF RIGHT ACHILLES TENDON, INITIAL ENCOUNTER: ICD-10-CM

## 2023-03-24 DIAGNOSIS — G89.29 CHRONIC PAIN OF RIGHT ANKLE: ICD-10-CM

## 2023-03-24 DIAGNOSIS — M65.28 CALCIFIC ACHILLES TENDINITIS: ICD-10-CM

## 2023-03-24 DIAGNOSIS — M25.571 CHRONIC PAIN OF RIGHT ANKLE: ICD-10-CM

## 2023-03-24 PROCEDURE — 99442 PR PHYSICIAN TELEPHONE EVALUATION 11-20 MIN: CPT | Mod: 95 | Performed by: FAMILY MEDICINE

## 2023-03-24 RX ORDER — FENTANYL 12.5 UG/1
1 PATCH TRANSDERMAL
Qty: 5 PATCH | Refills: 0 | Status: SHIPPED | OUTPATIENT
Start: 2023-03-24 | End: 2023-03-24

## 2023-03-24 RX ORDER — OXYCODONE HCL 10 MG/1
10 TABLET, FILM COATED, EXTENDED RELEASE ORAL EVERY 12 HOURS
Qty: 28 TABLET | Refills: 0 | Status: SHIPPED | OUTPATIENT
Start: 2023-03-24 | End: 2023-03-25

## 2023-03-24 ASSESSMENT — ASTHMA QUESTIONNAIRES
QUESTION_3 LAST FOUR WEEKS HOW OFTEN DID YOUR ASTHMA SYMPTOMS (WHEEZING, COUGHING, SHORTNESS OF BREATH, CHEST TIGHTNESS OR PAIN) WAKE YOU UP AT NIGHT OR EARLIER THAN USUAL IN THE MORNING: ONCE OR TWICE
QUESTION_5 LAST FOUR WEEKS HOW WOULD YOU RATE YOUR ASTHMA CONTROL: WELL CONTROLLED
QUESTION_4 LAST FOUR WEEKS HOW OFTEN HAVE YOU USED YOUR RESCUE INHALER OR NEBULIZER MEDICATION (SUCH AS ALBUTEROL): NOT AT ALL
QUESTION_1 LAST FOUR WEEKS HOW MUCH OF THE TIME DID YOUR ASTHMA KEEP YOU FROM GETTING AS MUCH DONE AT WORK, SCHOOL OR AT HOME: NONE OF THE TIME
ACT_TOTALSCORE: 23
QUESTION_2 LAST FOUR WEEKS HOW OFTEN HAVE YOU HAD SHORTNESS OF BREATH: NOT AT ALL
ACT_TOTALSCORE: 23

## 2023-03-24 NOTE — PROGRESS NOTES
"12 minute total phone visit.       Erwin is a 62 year old who is being evaluated via a billable telephone visit.      What phone number would you like to be contacted at?   How would you like to obtain your AVS? Pancho    Distant Location (provider location):  On-site    Assessment & Plan     Chronic pain syndrome  In context of \"threadbare\" achilles after failed surgical intervention recently heard /felt a \"pop\" while transferring and feels ruptured achilles fully.  Although slowly improving still quite painful.  Acute on chronic pain.     Discussed short term options given high level of pain, cannot walk, difficulty sleeping.  Will continue elevation, ice, compression.   Hoping for short term treatment with opiods since can't take substantial acetaminophen nor ibuprofen due to fatty liver.     Hoping for 24 hour coverate without having to take multiple pills.     Initially prescription for low dose fentanyl patch given.  Aware if becoming sedating to remove patch.  This was denied/not covered by insurance so sent in long-acting oxycontin.  Also expensive/not covered so in the end prescription for oxycodone given.     No suspicious activity on [place] rx monitoring database     Reviewed concepts of pain management contract which he is familiar with due to previous use. No previous h/o addiction or misuse.     Chronic pain of right ankle  Discussed usefullness of repeat MRI (he will schedule now) and pain management consultation. I will reach out and discuss history with pain  to give background and facilitate productive meeting.     After this is completed will likely need updated orthopedic consultation to re-visit the idea of amputation as solution for pain and mobility.l     Calcific Achilles tendinitis  Underlying root cause.     H/O foot surgery  As above.     Acquired calcaneus deformity of right ankle  Due to the surgery.     Intractable right heel pain  Previous diagnosis of reflex " sympathetic dystrophy.     Rupture of right Achilles tendon, initial encounter  Presumed new problem.                    Joel Daniel Wegener, MD  [facility]    Madeleine Hood is a 62 year old, presenting for the following health issues:  No chief complaint on file.         View : No data to display.              HPI             Review of Systems         Objective           Vitals:  No vitals were obtained today due to virtual visit.    Physical Exam   healthy, alert and no distress  PSYCH: Alert and oriented times 3; coherent speech, normal   rate and volume, able to articulate logical thoughts, able   to abstract reason, no tangential thoughts, no hallucinations   or delusions  His affect is normal  RESP: No cough, no audible wheezing, able to talk in full sentences  Remainder of exam unable to be completed due to telephone visits                Phone call duration: 12 minutes

## 2023-03-24 NOTE — TELEPHONE ENCOUNTER
Would he want to double book virtual visit to discuss pain medications at 4:00 today?  Or could use hold spots Monday/tuesday afternoon in-person or virtual his choice.     Joel Wegener,MD w

## 2023-03-24 NOTE — TELEPHONE ENCOUNTER
RN reviewed chart. Referral for consult received. Patient has been scheduled for a new consult. Last appointment was 1/21/22 with VANCE Huggins CNP.    Ruchi Cherry RN

## 2023-03-25 ENCOUNTER — MYC MEDICAL ADVICE (OUTPATIENT)
Dept: FAMILY MEDICINE | Facility: CLINIC | Age: 63
End: 2023-03-25
Payer: MEDICARE

## 2023-03-25 DIAGNOSIS — S86.011A RUPTURE OF RIGHT ACHILLES TENDON, INITIAL ENCOUNTER: ICD-10-CM

## 2023-03-25 DIAGNOSIS — G89.4 CHRONIC PAIN SYNDROME: ICD-10-CM

## 2023-03-25 DIAGNOSIS — G89.29 CHRONIC PAIN OF RIGHT ANKLE: ICD-10-CM

## 2023-03-25 DIAGNOSIS — M79.671 INTRACTABLE RIGHT HEEL PAIN: ICD-10-CM

## 2023-03-25 DIAGNOSIS — Z98.890 H/O FOOT SURGERY: ICD-10-CM

## 2023-03-25 DIAGNOSIS — M65.28 CALCIFIC ACHILLES TENDINITIS: ICD-10-CM

## 2023-03-25 DIAGNOSIS — M25.571 CHRONIC PAIN OF RIGHT ANKLE: ICD-10-CM

## 2023-03-25 DIAGNOSIS — M21.6X1 ACQUIRED CALCANEUS DEFORMITY OF RIGHT ANKLE: ICD-10-CM

## 2023-03-25 RX ORDER — OXYCODONE HYDROCHLORIDE 5 MG/1
5 TABLET ORAL 3 TIMES DAILY
Qty: 21 TABLET | Refills: 0 | Status: SHIPPED | OUTPATIENT
Start: 2023-03-25 | End: 2023-03-30

## 2023-03-26 ENCOUNTER — MYC MEDICAL ADVICE (OUTPATIENT)
Dept: FAMILY MEDICINE | Facility: CLINIC | Age: 63
End: 2023-03-26
Payer: MEDICARE

## 2023-03-27 ENCOUNTER — TELEPHONE (OUTPATIENT)
Dept: FAMILY MEDICINE | Facility: CLINIC | Age: 63
End: 2023-03-27

## 2023-03-27 NOTE — TELEPHONE ENCOUNTER
Prior Authorization Retail Medication Request    Medication/Dose: oxyCODONE (OXYCONTIN) 10 MG 12 hr tablet  ICD code (if different than what is on RX):   Chronic pain syndrome [G89.4]  - Primary       Chronic pain of right ankle [M25.571, G89.29]       Calcific Achilles tendinitis [M65.28]       H/O foot surgery [Z98.890]       Acquired calcaneus deformity of right ankle [M21.6X1]       Intractable right heel pain [M79.671]       Rupture of right Achilles tendon, initial encounter [S86.011A]         Previously Tried and Failed:  unknown  Rationale:  unknown    Insurance Name:  medicare  Insurance ID:  [id number]

## 2023-03-29 NOTE — TELEPHONE ENCOUNTER
Prior Authorization Not Needed per Pharmacy this RX was cancelled and a different medication was sent in its place.    Medication: oxyCODONE (OXYCONTIN) 10 MG 12 hr tablet PA NOT NEEDED  Insurance Company:    Expected CoPay:      [id number]  Pharmacy Notified: Yes  Patient Notified:        Central Prior Authorization Team   Phone: [phone]

## 2023-04-11 ENCOUNTER — HOSPITAL ENCOUNTER (OUTPATIENT)
Facility: CLINIC | Age: 63
End: 2023-04-11
Payer: MEDICARE

## 2023-04-25 ENCOUNTER — MYC MEDICAL ADVICE (OUTPATIENT)
Dept: FAMILY MEDICINE | Facility: CLINIC | Age: 63
End: 2023-04-25
Payer: MEDICARE

## 2023-05-01 ENCOUNTER — OFFICE VISIT (OUTPATIENT)
Dept: FAMILY MEDICINE | Facility: CLINIC | Age: 63
End: 2023-05-01
Payer: MEDICARE

## 2023-05-01 VITALS
BODY MASS INDEX: 30.36 KG/M2 | HEART RATE: 49 BPM | SYSTOLIC BLOOD PRESSURE: 138 MMHG | WEIGHT: 217.7 LBS | TEMPERATURE: 97.3 F | DIASTOLIC BLOOD PRESSURE: 78 MMHG | OXYGEN SATURATION: 98 % | RESPIRATION RATE: 18 BRPM

## 2023-05-01 DIAGNOSIS — G89.4 CHRONIC PAIN SYNDROME: ICD-10-CM

## 2023-05-01 DIAGNOSIS — G89.29 CHRONIC PAIN OF RIGHT ANKLE: ICD-10-CM

## 2023-05-01 DIAGNOSIS — S86.011A RUPTURE OF RIGHT ACHILLES TENDON, INITIAL ENCOUNTER: ICD-10-CM

## 2023-05-01 DIAGNOSIS — Z79.4 TYPE 2 DIABETES MELLITUS WITHOUT COMPLICATION, WITH LONG-TERM CURRENT USE OF INSULIN (H): ICD-10-CM

## 2023-05-01 DIAGNOSIS — Z01.818 PREOP GENERAL PHYSICAL EXAM: Primary | ICD-10-CM

## 2023-05-01 DIAGNOSIS — M21.6X1 ACQUIRED CALCANEUS DEFORMITY OF RIGHT ANKLE: ICD-10-CM

## 2023-05-01 DIAGNOSIS — M65.28 CALCIFIC ACHILLES TENDINITIS: ICD-10-CM

## 2023-05-01 DIAGNOSIS — M79.671 INTRACTABLE RIGHT HEEL PAIN: ICD-10-CM

## 2023-05-01 DIAGNOSIS — Z98.890 H/O FOOT SURGERY: ICD-10-CM

## 2023-05-01 DIAGNOSIS — M25.571 CHRONIC PAIN OF RIGHT ANKLE: ICD-10-CM

## 2023-05-01 DIAGNOSIS — E11.9 TYPE 2 DIABETES MELLITUS WITHOUT COMPLICATION, WITH LONG-TERM CURRENT USE OF INSULIN (H): ICD-10-CM

## 2023-05-01 LAB
ALBUMIN SERPL BCG-MCNC: 4.5 G/DL (ref 3.5–5.2)
ALP SERPL-CCNC: 85 U/L (ref 40–129)
ALT SERPL W P-5'-P-CCNC: 21 U/L (ref 10–50)
ANION GAP SERPL CALCULATED.3IONS-SCNC: 12 MMOL/L (ref 7–15)
AST SERPL W P-5'-P-CCNC: 22 U/L (ref 10–50)
BILIRUB SERPL-MCNC: 0.4 MG/DL
BUN SERPL-MCNC: 13.2 MG/DL (ref 8–23)
CALCIUM SERPL-MCNC: 9.5 MG/DL (ref 8.8–10.2)
CHLORIDE SERPL-SCNC: 102 MMOL/L (ref 98–107)
CREAT SERPL-MCNC: 0.78 MG/DL (ref 0.67–1.17)
CREAT UR-MCNC: 32.3 MG/DL
DEPRECATED HCO3 PLAS-SCNC: 25 MMOL/L (ref 22–29)
GFR SERPL CREATININE-BSD FRML MDRD: >90 ML/MIN/1.73M2
GLUCOSE SERPL-MCNC: 85 MG/DL (ref 70–99)
HBA1C MFR BLD: 5.8 % (ref 0–5.6)
MICROALBUMIN UR-MCNC: <12 MG/L
MICROALBUMIN/CREAT UR: NORMAL MG/G{CREAT}
POTASSIUM SERPL-SCNC: 3.9 MMOL/L (ref 3.4–5.3)
PROT SERPL-MCNC: 7.4 G/DL (ref 6.4–8.3)
SODIUM SERPL-SCNC: 139 MMOL/L (ref 136–145)

## 2023-05-01 PROCEDURE — 82570 ASSAY OF URINE CREATININE: CPT | Performed by: FAMILY MEDICINE

## 2023-05-01 PROCEDURE — 99214 OFFICE O/P EST MOD 30 MIN: CPT | Performed by: FAMILY MEDICINE

## 2023-05-01 PROCEDURE — 80053 COMPREHEN METABOLIC PANEL: CPT | Performed by: FAMILY MEDICINE

## 2023-05-01 PROCEDURE — 82043 UR ALBUMIN QUANTITATIVE: CPT | Performed by: FAMILY MEDICINE

## 2023-05-01 PROCEDURE — 83036 HEMOGLOBIN GLYCOSYLATED A1C: CPT | Performed by: FAMILY MEDICINE

## 2023-05-01 PROCEDURE — 36415 COLL VENOUS BLD VENIPUNCTURE: CPT | Performed by: FAMILY MEDICINE

## 2023-05-01 RX ORDER — OXYCODONE HYDROCHLORIDE 5 MG/1
5 TABLET ORAL 3 TIMES DAILY
Qty: 21 TABLET | Refills: 0 | Status: SHIPPED | OUTPATIENT
Start: 2023-05-01 | End: 2023-05-08

## 2023-05-01 ASSESSMENT — ANXIETY QUESTIONNAIRES
7. FEELING AFRAID AS IF SOMETHING AWFUL MIGHT HAPPEN: NOT AT ALL
7. FEELING AFRAID AS IF SOMETHING AWFUL MIGHT HAPPEN: NOT AT ALL
4. TROUBLE RELAXING: NOT AT ALL
4. TROUBLE RELAXING: NOT AT ALL
1. FEELING NERVOUS, ANXIOUS, OR ON EDGE: NOT AT ALL
GAD7 TOTAL SCORE: 0
3. WORRYING TOO MUCH ABOUT DIFFERENT THINGS: NOT AT ALL
6. BECOMING EASILY ANNOYED OR IRRITABLE: NOT AT ALL
GAD7 TOTAL SCORE: 0
5. BEING SO RESTLESS THAT IT IS HARD TO SIT STILL: NOT AT ALL
6. BECOMING EASILY ANNOYED OR IRRITABLE: NOT AT ALL
5. BEING SO RESTLESS THAT IT IS HARD TO SIT STILL: NOT AT ALL
1. FEELING NERVOUS, ANXIOUS, OR ON EDGE: NOT AT ALL
3. WORRYING TOO MUCH ABOUT DIFFERENT THINGS: NOT AT ALL
GAD7 TOTAL SCORE: 0
2. NOT BEING ABLE TO STOP OR CONTROL WORRYING: NOT AT ALL
7. FEELING AFRAID AS IF SOMETHING AWFUL MIGHT HAPPEN: NOT AT ALL
GAD7 TOTAL SCORE: 0
7. FEELING AFRAID AS IF SOMETHING AWFUL MIGHT HAPPEN: NOT AT ALL
2. NOT BEING ABLE TO STOP OR CONTROL WORRYING: NOT AT ALL

## 2023-05-01 ASSESSMENT — PATIENT HEALTH QUESTIONNAIRE - PHQ9
SUM OF ALL RESPONSES TO PHQ QUESTIONS 1-9: 4
SUM OF ALL RESPONSES TO PHQ QUESTIONS 1-9: 4
10. IF YOU CHECKED OFF ANY PROBLEMS, HOW DIFFICULT HAVE THESE PROBLEMS MADE IT FOR YOU TO DO YOUR WORK, TAKE CARE OF THINGS AT HOME, OR GET ALONG WITH OTHER PEOPLE: VERY DIFFICULT

## 2023-05-01 ASSESSMENT — PAIN SCALES - GENERAL: PAINLEVEL: WORST PAIN (10)

## 2023-05-01 NOTE — PATIENT INSTRUCTIONS
Cleared for procedure.     Skip all medications on the morning of the procedure.     Decrease lantus to 10 units the day before the MRI/procedure.     Sent in one week of oxycodone to last until after visit with Dr. Esteban( pain specialty) and will discuss changing to buprenorphine product and/or increase oxycodone since the oxycodone is helping so much.       For informational purposes only. Not to replace the advice of your health care provider. Copyright   2003,  [place] [employer] [facility]. All rights reserved. Clinically reviewed by Jessica Rader MD. [employer]P [id number] - REV .  Preparing for Your Surgery  Getting started  A nurse will call you to review your health history and instructions. They will give you an arrival time based on your scheduled surgery time. Please be ready to share:  Your doctor's clinic name and phone number  Your medical, surgical, and anesthesia history  A list of allergies and sensitivities  A list of medicines, including herbal treatments and over-the-counter drugs  Whether the patient has a legal guardian (ask how to send us the papers in advance)  Please tell us if you're pregnant--or if there's any chance you might be pregnant. Some surgeries may injure a fetus (unborn baby), so they require a pregnancy test. Surgeries that are safe for a fetus don't always need a test, and you can choose whether to have one.   If you have a child who's having surgery, please ask for a copy of Preparing for Your Child's Surgery.    Preparing for surgery  Within 10 to 30 days of surgery: Have a pre-op exam (sometimes called an H&P, or History and Physical). This can be done at a clinic or pre-operative center.  If you're having a , you may not need this exam. Talk to your care team.  At your pre-op exam, talk to your care team about all medicines you take. If you need to stop any medicines before surgery, ask when to start taking them again.  We do this for your safety. Many medicines can  make you bleed too much during surgery. Some change how well surgery (anesthesia) drugs work.  Call your insurance company to let them know you're having surgery. (If you don't have insurance, call [phone].)  Call your clinic if there's any change in your health. This includes signs of a cold or flu (sore throat, runny nose, cough, rash, fever). It also includes a scrape or scratch near the surgery site.  If you have questions on the day of surgery, call your hospital or surgery center.  Eating and drinking guidelines  For your safety: Unless your surgeon tells you otherwise, follow the guidelines below.  Eat and drink as usual until 8 hours before you arrive for surgery. After that, no food or milk.  Drink clear liquids until 2 hours before you arrive. These are liquids you can see through, like water, Gatorade, and Propel Water. They also include plain black coffee and tea (no cream or milk), candy, and breath mints. You can spit out gum when you arrive.  If you drink alcohol: Stop drinking it the night before surgery.  If your care team tells you to take medicine on the morning of surgery, it's okay to take it with a sip of water.  Preventing infection  Shower or bathe the night before and morning of your surgery. Follow the instructions your clinic gave you. (If no instructions, use regular soap.)  Don't shave or clip hair near your surgery site. We'll remove the hair if needed.  Don't smoke or vape the morning of surgery. You may chew nicotine gum up to 2 hours before surgery. A nicotine patch is okay.  Note: Some surgeries require you to completely quit smoking and nicotine. Check with your surgeon.  Your care team will make every effort to keep you safe from infection. We will:  Clean our hands often with soap and water (or an alcohol-based hand rub).  Clean the skin at your surgery site with a special soap that kills germs.  Give you a special gown to keep you warm. (Cold raises the risk of  infection.)  Wear special hair covers, masks, gowns and gloves during surgery.  Give antibiotic medicine, if prescribed. Not all surgeries need antibiotics.  What to bring on the day of surgery  Photo ID and insurance card  Copy of your health care directive, if you have one  Glasses and hearing aids (bring cases)  You can't wear contacts during surgery  Inhaler and eye drops, if you use them (tell us about these when you arrive)  CPAP machine or breathing device, if you use them  A few personal items, if spending the night  If you have . . .  A pacemaker, ICD (cardiac defibrillator) or other implant: Bring the ID card.  An implanted stimulator: Bring the remote control.  A legal guardian: Bring a copy of the certified (court-stamped) guardianship papers.  Please remove any jewelry, including body piercings. Leave jewelry and other valuables at home.  If you're going home the day of surgery  You must have a responsible adult drive you home. They should stay with you overnight as well.  If you don't have someone to stay with you, and you aren't safe to go home alone, we may keep you overnight. Insurance often won't pay for this.  After surgery  If it's hard to control your pain or you need more pain medicine, please call your surgeon's office.  Questions?   If you have any questions for your care team, list them here: _________________________________________________________________________________________________________________________________________________________________________ ____________________________________ ____________________________________ ____________________________________    How to Take Your Medication Before Surgery

## 2023-05-01 NOTE — PROGRESS NOTES
[facility]  [address], [address]  [facility] [address]  Phone: [phone]  Primary Provider: Wegener, Joel Daniel Irwin  Pre-op Performing Provider: WEGENER, JOEL DANIEL IRWIN      PREOPERATIVE EVALUATION:  Today's date: 5/1/2023    Erwin Aguilar is a 62 year old male who presents for a preoperative evaluation.       View : No data to display.              Surgical Information:  Surgery/Procedure: Sedated MRI  Surgery Location: [facility] OR  Surgeon: TBD  Surgery Date: 5/9/2023  Time of Surgery: 12:30 PM  Where patient plans to recover: At home with family  Fax number for surgical facility: Note does not need to be faxed, will be available electronically in Epic.    Assessment & Plan     ADDENDUM 05/26/23:  Mr. Aguilar has had no changes in health and is still cleared for his upcoming procedure/MRI with anesthesia if done within the next 30 days.             The proposed surgical procedure is considered LOW risk.    Preop general physical exam  Cleared for procedure.     Skip all medications on the morning of the procedure.     Decrease lantus to 10 units the day before the MRI/procedure.     Sent in one week of oxycodone to last until after visit with Dr. Esteban( pain specialty) and will discuss changing to buprenorphine product and/or increase oxycodone since the oxycodone is helping so much.     Calcific Achilles tendinitis    - oxyCODONE (ROXICODONE) 5 MG tablet; Take 1 tablet (5 mg) by mouth 3 times daily for 7 days    Rupture of right Achilles tendon, initial encounter    - oxyCODONE (ROXICODONE) 5 MG tablet; Take 1 tablet (5 mg) by mouth 3 times daily for 7 days    Type 2 diabetes mellitus without complication, with long-term current use of insulin (H)  Lab Results   Component Value Date    A1C 5.8 05/01/2023    A1C 5.9 10/21/2022    A1C 5.9 02/12/2022    A1C 5.6 10/04/2021    A1C 6.0 04/09/2021    A1C 5.8 07/20/2020    A1C 6.4 10/08/2019    A1C 6.8 04/02/2019    A1C 7.2  "01/14/2019     At goal, no changes.  Labs due as below.   - Hemoglobin A1c; Future  - Albumin Random Urine Quantitative with Creat Ratio; Future  - Comprehensive metabolic panel (BMP + Alb, Alk Phos, ALT, AST, Total. Bili, TP); Future  - Hemoglobin A1c  - Albumin Random Urine Quantitative with Creat Ratio  - Comprehensive metabolic panel (BMP + Alb, Alk Phos, ALT, AST, Total. Bili, TP)    Chronic pain syndrome  Due to reasons as below.  Reviewed/signed csa.  No suspicious activity on [place] rx monitoring database   Upcoming pain management appointment as above.   - oxyCODONE (ROXICODONE) 5 MG tablet; Take 1 tablet (5 mg) by mouth 3 times daily for 7 days    Chronic pain of right ankle    - oxyCODONE (ROXICODONE) 5 MG tablet; Take 1 tablet (5 mg) by mouth 3 times daily for 7 days    H/O foot surgery    - oxyCODONE (ROXICODONE) 5 MG tablet; Take 1 tablet (5 mg) by mouth 3 times daily for 7 days    Acquired calcaneus deformity of right ankle    - oxyCODONE (ROXICODONE) 5 MG tablet; Take 1 tablet (5 mg) by mouth 3 times daily for 7 days    Intractable right heel pain    - oxyCODONE (ROXICODONE) 5 MG tablet; Take 1 tablet (5 mg) by mouth 3 times daily for 7 days       Implanted Device:   - Type of device: spinal stimulator Patient advised to bring device information on day of surgery.    Obesity:  Additional risk for pulmonary complications from anesthesia.      - No identified additional risk factors other than previously addressed        RECOMMENDATION:  APPROVAL GIVEN to proceed with proposed procedure, without further diagnostic evaluation.            Subjective     HPI related to upcoming procedure: planning MRI right ankle/foot to re-evaluate after distant surgery for achilles tendonitis.  Continues to have high level of pain in that foot and lower leg.  Highly sensitive to even light touch. Has never felt that the surgical wound has fully healed always breaks open.  About one month ago felt a \"pop\" and assumed " "remainder of achilles tendon had ruptured.     Although he used with hesitation he has found that the oxycodone pain medication has been extremely helpful.  \"only thing that  He helped me feel back to normal so far.\" has been able to bike again.  Walking still limited due to pain.  Able to participate more in life.  Spouse notes used to go to bed very early due to mounting pain and  Now able to stay up with her to what is usual normal bedtime.  Sleeping better as well.         5/1/2023     1:44 PM   Preop Questions   1. Have you ever had a heart attack or stroke? No   2. Have you ever had surgery on your heart or blood vessels, such as a stent placement, a coronary artery bypass, or surgery on an artery in your head, neck, heart, or legs? No   3. Do you have chest pain with activity? No   4. Do you have a history of  heart failure? No   5. Do you currently have a cold, bronchitis or symptoms of other infection? No   6. Do you have a cough, shortness of breath, or wheezing? No   7. Do you or anyone in your family have previous history of blood clots? No   8. Do you or does anyone in your family have a serious bleeding problem such as prolonged bleeding following surgeries or cuts? YES -    9. Have you ever had problems with anemia or been told to take iron pills? YES -    10. Have you had any abnormal blood loss such as black, tarry or bloody stools? YES - not currently   11. Have you ever had a blood transfusion? YES -    11a. Have you ever had a transfusion reaction? No   12. Are you willing to have a blood transfusion if it is medically needed before, during, or after your surgery? Yes   13. Have you or any of your relatives ever had problems with anesthesia? No   14. Do you have sleep apnea, excessive snoring or daytime drowsiness? No   15. Do you have any artifical heart valves or other implanted medical devices like a pacemaker, defibrillator, or continuous glucose monitor? YES - spinal cord stimulator   15a. " What type of device do you have? spinal cord stimulator   15b. Name of the clinic that manages your device:  [facility]   16. Do you have artificial joints? No   17. Are you allergic to latex? No       Health Care Directive:  Patient does not have a Health Care Directive or Living Will: Discussed advance care planning with patient; information given to patient to review.    Preoperative Review of :   reviewed - oxycodone 5 mg #21 on 05/2023          Review of Systems  CONSTITUTIONAL: NEGATIVE for fever, chills, change in weight  ENT/MOUTH: NEGATIVE for ear, mouth and throat problems  RESP: NEGATIVE for significant cough or SOB  CV: NEGATIVE for chest pain, palpitations or peripheral edema    Patient Active Problem List    Diagnosis Date Noted     Adjustment disorder with anxious mood 10/26/2021     Priority: Medium     Psychophysiological insomnia 10/26/2021     Priority: Medium     At risk for prolonged QT interval syndrome 10/26/2021     Priority: Medium     Paroxysmal atrial fibrillation (H) 09/22/2021     Priority: Medium     Acquired calcaneus deformity of right ankle 05/06/2021     Priority: Medium     H/O foot surgery 05/06/2021     Priority: Medium     Calcific Achilles tendinitis 05/06/2021     Priority: Medium     Right ankle pain 10/20/2020     Priority: Medium     Added automatically from request for surgery [id number]       Gastroparesis 10/08/2019     Priority: Medium     Alcoholism in remission (H) 10/08/2019     Priority: Medium     Osteoarthritis of lumbar spine, unspecified spinal osteoarthritis complication status 09/17/2018     Priority: Medium     S/P insertion of spinal cord stimulator 09/17/2018     Priority: Medium     Hematemesis with nausea 06/30/2018     Priority: Medium     Benign neoplasm of skin of trunk, except scrotum 01/12/2018     Priority: Medium     Type 2 diabetes mellitus with diabetic polyneuropathy, with long-term current use of insulin (H) 05/09/2017     Priority:  Medium     Wound, open, buttock, right 04/20/2017     Priority: Medium     Type 2 diabetes mellitus without complication, with long-term current use of insulin (H) 10/06/2016     Priority: Medium     Calculus of gallbladder without cholecystitis without obstruction 05/10/2016     Priority: Medium     Lumbosacral radiculopathy 02/10/2016     Priority: Medium     S/p SCS implant 2010- [employer]       Alcoholic cirrhosis of liver without ascites (H) 10/13/2015     Priority: Medium     Right knee pain 09/20/2015     Priority: Medium     Fall 07/11/2015     Priority: Medium     Closed fracture of right patella 07/11/2015     Priority: Medium     Diagnosis updated by automated process. Provider to review and confirm.       Open wound of right heel 12/23/2014     Priority: Medium     Moderate persistent asthma 09/04/2014     Priority: Medium     Ganglion cyst 06/04/2014     Priority: Medium     Dry skin dermatitis 06/04/2014     Priority: Medium     Superficial thrombophlebitis of arm 04/03/2014     Priority: Medium     Cellulitis of hand 04/03/2014     Priority: Medium     Skin infection 04/02/2014     Priority: Medium     Cirrhosis of liver (H) 04/01/2014     Priority: Medium     Not biopsy-proven as of 4/1/14.       Anemia due to blood loss, acute 04/01/2014     Priority: Medium     Edema of left lower extremity 03/31/2014     Priority: Medium     Muscle spasms of Upper extremity 01/20/2014     Priority: Medium     Atopic dermatitis 01/17/2014     Priority: Medium     Nausea without vomiting 11/27/2013     Priority: Medium     Problem list name updated by automated process. Provider to review       Cellulitis 11/01/2013     Priority: Medium     Wound infection 08/05/2013     Priority: Medium     Hypokalemia 11/06/2012     Priority: Medium     Diastasis recti 11/05/2012     Priority: Medium     Hernia 10/25/2012     Priority: Medium     Abnormal EMG 05/01/2012     Priority: Medium     Health Care Home 10/28/2011      Priority: Medium     EMERGENCY CARE PLAN  Presenting Problem Signs and Symptoms Treatment Plan    Questions or conerns during clinic hours    I will call the clinic directly [phone]     Questions or conerns outside clinic hours    I will call the 24 hour nurse line at [phone]    Patient needs to schedule an appointment    I will call the 24 hour scheduling team at [phone] or clinic directly    Same day treatment     I will call the clinic first, nurse line if after hours, urgent care and express care if needed                          DX V65.8 REPLACED WITH [id number] [facility] HOME (04/08/2013)       Abnormal liver function tests 06/14/2011     Priority: Medium     GERD (gastroesophageal reflux disease) 06/13/2011     Priority: Medium     Chronic pain syndrome 03/14/2011     Priority: Medium     Failed back surgery L5-S1   August 2009  Post-op complications: right dropped foot, bowel and bladder involvement    Patient is followed by WEGENER, JOEL DANIEL IRWIN for ongoing prescription of pain medication.  All refills should be approved by this provider, or covering partner.    Medication(s): oxycodone.   Maximum quantity per month: see sig  Clinic visit frequency required: Q 3 months     Controlled substance agreement on file: Yes       Date(s):  March 1, 2016      Pain Clinic evaluation in the past:      Date/Location:  non-[facility], spinal stimulator,  [facility] x one 2015.     DIRE Total Score(s):  No flowsheet data found.    Last [facility] website verification: March 1, 2016     [email or web address]         Major depressive disorder, recurrent episode, mild (H) 12/13/2010     Priority: Medium     Hypertension goal BP (blood pressure) < 140/90 12/06/2010     Priority: Medium     Type 2 diabetes, HbA1c goal < 7% (H) 10/31/2010     Priority: Medium     Per provider         Hyperlipidemia LDL goal <100 10/31/2010     Priority: Medium     Per provider         Generalized muscle weakness 06/19/2009      Priority: Medium     Generalized anxiety disorder      Priority: Medium      Past Medical History:   Diagnosis Date     Actinic keratosis     aldara     Anxiety      Cancer (H)     squamous cell skin CA     Cauda equina spinal cord injury (H)      Chronic sinusitis 5-1-16     Depressive disorder      Diastasis recti      Esophageal reflux      Esophageal varices in cirrhosis (H) 4/1/2014    Hospitalized for UGI blee 3/28/14, endoscopy revealed bleeding varices.     Essential hypertension, benign      Intermittent asthma      Mild depression      Mixed hyperlipidemia      Nasal polyps 5-1-16     Osteoarthritis of lumbar spine, unspecified spinal osteoarthritis complication status      Other chronic pain      Paroxysmal atrial fibrillation (H) 9/22/2021     SCCA (squamous cell carcinoma) of skin      Seasonal allergic conjunctivitis      Type II or unspecified type diabetes mellitus without mention of complication, not stated as uncontrolled      Unspecified site of spinal cord injury without evidence of spinal bone injury     due to back surgery     Past Surgical History:   Procedure Laterality Date     ABDOMEN SURGERY  2014     BACK SURGERY  August 2009     COLONOSCOPY N/A 5/12/2016    Procedure: COMBINED COLONOSCOPY, SINGLE OR MULTIPLE BIOPSY/POLYPECTOMY BY BIOPSY;  Surgeon: Ana Paula Urbina MD;  Location:  GI     ENDOSCOPY UPPER, COLONOSCOPY, COMBINED  10/19/2011    Procedure:COMBINED ENDOSCOPY UPPER, COLONOSCOPY; Upper Endoscopy, Colonoscopy with Polypectomy x4; Surgeon:AMBAR RODRÍGUEZ; Location: OR     ENT SURGERY  1-2016    Ongoing since then     ESOPHAGOSCOPY, GASTROSCOPY, DUODENOSCOPY (EGD), COMBINED  3/28/2014    Procedure: COMBINED ESOPHAGOSCOPY, GASTROSCOPY, DUODENOSCOPY (EGD);  EGD, Gastric Biopsies, Esophageal Banding;  Surgeon: Reyna Tovar MD;  Location:  OR     ESOPHAGOSCOPY, GASTROSCOPY, DUODENOSCOPY (EGD), COMBINED  6/9/2014    Procedure: COMBINED ESOPHAGOSCOPY,  GASTROSCOPY, DUODENOSCOPY (EGD);  Surgeon: Curtis Mendez MD;  Location: [facility] GI     ESOPHAGOSCOPY, GASTROSCOPY, DUODENOSCOPY (EGD), COMBINED  7/24/2014    Procedure: COMBINED ESOPHAGOSCOPY, GASTROSCOPY, DUODENOSCOPY (EGD);  Surgeon: Gerard Samaniego MD;  Location: [facility] OR     ESOPHAGOSCOPY, GASTROSCOPY, DUODENOSCOPY (EGD), COMBINED N/A 10/31/2014    Procedure: COMBINED ESOPHAGOSCOPY, GASTROSCOPY, DUODENOSCOPY (EGD);  Surgeon: Gerard Samaniego MD;  Location: [facility] OR     ESOPHAGOSCOPY, GASTROSCOPY, DUODENOSCOPY (EGD), COMBINED N/A 5/12/2016    Procedure: COMBINED ESOPHAGOSCOPY, GASTROSCOPY, DUODENOSCOPY (EGD);  Surgeon: Ana Paula Urbina MD;  Location: [facility] GI     ESOPHAGOSCOPY, GASTROSCOPY, DUODENOSCOPY (EGD), COMBINED N/A 8/2/2018    Procedure: COMBINED ESOPHAGOSCOPY, GASTROSCOPY, DUODENOSCOPY (EGD);  EGD;  Surgeon: Yu Wagner MD;  Location: [facility] GI     HCL SQUAMOUS CELL CARCINOMA AG  10/07    left forearm     HERNIORRHAPHY UMBILICAL  11/8/2012    Procedure: HERNIORRHAPHY UMBILICAL;  Open Umbilical Hernia Repair With Mesh ;  Surgeon: Chase Nicholson MD;  Location: [facility] OR     INSERT STIMULATOR DORSAL COLUMN N/A 6/28/2018    Procedure: INSERT STIMULATOR DORSAL COLUMN;;  Surgeon: Elvis Sauceda MD;  [facility] OR     neuro stimulator  2010     REMOVE GENERATOR STIMULATOR (LOCATION) N/A 6/28/2018    Procedure: REMOVE GENERATOR STIMULATOR (LOCATION);  Spinal Cord Stimulator and IPG Explant and Re-Implant of SCS System (Leads and IPG);  Surgeon: Elvis Sauceda MD;  [facility]     REPAIR TENDON ACHILLES Right 11/11/2020    Procedure: Right achilles debridement and partial calcaneus excision;  Surgeon: Eh Sandoval MD;  [place] [facility]     SURGICAL HISTORY OF -   1/02    abcess tooth     SURGICAL HISTORY OF -   1999    L4-5 laminectomy, cauda equina syndrome     SURGICAL HISTORY OF -   +    herniated disk repair     TONSILLECTOMY   10 1964     TRANSPOSITION ULNAR NERVE (ELBOW)      right     ZZC APPENDECTOMY  1974     Current Outpatient Medications   Medication Sig Dispense Refill     ACE/ARB NOT PRESCRIBED, INTENTIONAL, ACE & ARB not prescribed due to Allergy       albuterol (PROAIR HFA/PROVENTIL HFA/VENTOLIN HFA) 108 (90 Base) MCG/ACT inhaler INHALE 2 PUFFS BY MOUTH EVERY 6 HOURS AS NEEDED FOR SHORTNESS OF BREATH/DYSPNEA OR WHEEZING 18 g 1     apixaban ANTICOAGULANT (ELIQUIS ANTICOAGULANT) 5 MG tablet Take 1 tablet (5 mg) by mouth 2 times daily 180 tablet 3     baclofen (LIORESAL) 10 MG tablet TAKE 2 TABLETS BY MOUTH THREE TIMES DAILY 180 tablet 3     doxazosin (CARDURA) 8 MG tablet TAKE 1 TABLET (8 MG) BY MOUTH AT BEDTIME 90 tablet 1     GLOBAL EASE INJECT PEN NEEDLES 32G X 4 MM miscellaneous USE AS DIRECTED EVERY  each 1     insulin glargine (LANTUS PEN) 100 UNIT/ML pen Inject 26 Units Subcutaneous At Bedtime 30 mL 3     medical cannabis (Patient's own supply) See Admin Instructions (The purpose of this order is to document that the patient reports taking medical cannabis.  This is not a prescription, and is not used to certify that the patient has a qualifying medical condition.)       oxyCODONE (ROXICODONE) 5 MG tablet Take 1 tablet (5 mg) by mouth 3 times daily for 7 days 21 tablet 0     promethazine (PHENERGAN) 25 MG tablet TAKE 1 TABLET(25 MG) BY MOUTH THREE TIMES DAILY AS NEEDED FOR NAUSEA 90 tablet 4     propranolol (INDERAL) 40 MG tablet Take 2 tablets (80 mg) by mouth 2 times daily 360 tablet 3     rosuvastatin (CRESTOR) 20 MG tablet Take 1 tablet (20 mg) by mouth daily 90 tablet 3     sertraline (ZOLOFT) 100 MG tablet TAKE 2 TABLETS BY MOUTH DAILY 180 tablet 3     triamterene-HCTZ (DYAZIDE) 37.5-25 MG capsule Take 2 capsules by mouth every morning 180 capsule 3     gabapentin (NEURONTIN) 300 MG capsule 300 mg at bedtime x 3 days 600 mg at bedtime X 3 days 300 mg afternoon and 600 mg at bedtime 90 capsule 0       Allergies    Allergen Reactions     Levaquin Anaphylaxis and Rash     Swelling in lip and tongue felt thick     Lisinopril Anaphylaxis     Swollen tongue; inability to swallow; drooling; hives; swollen face, neck, angioedema     Acetaminophen      Hx of cirrhosis      Amlodipine      Swelling, hives, possible angioedema       Morphine      b/p dropped and arms went numb  Hypotension     Quinolones Hives     Spironolactone Unknown     Pt believes himself to be allergic to it; cannot find his old records of this.-mjc      Bactrim [Sulfamethoxazole-Trimethoprim] Rash        Social History     Tobacco Use     Smoking status: Former     Packs/day: 0.00     Years: 1.00     Pack years: 0.00     Types: Cigarettes     Start date: 10/13/1983     Quit date: 1984     Years since quittin.9     Smokeless tobacco: Never   Vaping Use     Vaping status: Never Used   Substance Use Topics     Alcohol use: No     Alcohol/week: 0.0 standard drinks of alcohol     Comment: a pint of alcohol / day - last drink was 3/28/14     Family History   Problem Relation Age of Onset     Cancer Mother         lung     Breast Cancer Mother      Other Cancer Mother      Cancer Father         esophogeal, GERD     Snoring Father      Diabetes Brother         amputation, Type 1     Diabetes Brother      Diabetes Brother      Cancer - colorectal No family hx of      Glaucoma No family hx of      Macular Degeneration No family hx of      History   Drug Use     Frequency: 2.0 times per week     Types: Marijuana     Comment: medical marijuana - occasional         Objective     /78   Pulse (!) 49   Temp 97.3  F (36.3  C) (Temporal)   Resp 18   Wt 98.7 kg (217 lb 11.2 oz)   SpO2 98%   BMI 30.36 kg/m      Physical Exam  GENERAL APPEARANCE: healthy, alert and no distress  RESP: lungs clear to auscultation - no rales, rhonchi or wheezes  CV: regular rate and rhythm, normal S1 S2, no S3 or S4 and no murmur, click or rub   ABDOMEN: soft, nontender, no HSM  or masses and bowel sounds normal  NEURO: Normal strength and tone, sensory exam grossly normal, mentation intact and speech normal  1cm deep scar right posterior heel.  Unable to plantar/dorsiflex heel.  Can flex/extend great toe.  Slight movement with calf compression.         Recent Labs   Lab Test 10/21/22  1006 07/16/22  1342 07/15/22  0724 02/12/22  1113   HGB  --  14.0 14.3 14.1   PLT  --  187 191 221   INR  --  1.08 1.04 1.26*   * 141 140 138   POTASSIUM 3.6 3.6 3.7 3.4   CR 0.75 0.83 0.71 0.81   A1C 5.9*  --   --  5.9*        Diagnostics:  Recent Results (from the past 168 hour(s))   Hemoglobin A1c    Collection Time: 05/01/23  1:51 PM   Result Value Ref Range    Hemoglobin A1C 5.8 (H) 0.0 - 5.6 %   Comprehensive metabolic panel (BMP + Alb, Alk Phos, ALT, AST, Total. Bili, TP)    Collection Time: 05/01/23  1:51 PM   Result Value Ref Range    Sodium 139 136 - 145 mmol/L    Potassium 3.9 3.4 - 5.3 mmol/L    Chloride 102 98 - 107 mmol/L    Carbon Dioxide (CO2) 25 22 - 29 mmol/L    Anion Gap 12 7 - 15 mmol/L    Urea Nitrogen 13.2 8.0 - 23.0 mg/dL    Creatinine 0.78 0.67 - 1.17 mg/dL    Calcium 9.5 8.8 - 10.2 mg/dL    Glucose 85 70 - 99 mg/dL    Alkaline Phosphatase 85 40 - 129 U/L    AST 22 10 - 50 U/L    ALT 21 10 - 50 U/L    Protein Total 7.4 6.4 - 8.3 g/dL    Albumin 4.5 3.5 - 5.2 g/dL    Bilirubin Total 0.4 <=1.2 mg/dL    GFR Estimate >90 >60 mL/min/1.73m2   Albumin Random Urine Quantitative with Creat Ratio    Collection Time: 05/01/23  3:12 PM   Result Value Ref Range    Creatinine Urine mg/dL 32.3 mg/dL    Albumin Urine mg/L <12.0 mg/L    Albumin Urine mg/g Cr        No EKG required for low risk surgery (cataract, skin procedure, breast biopsy, etc).  No EKG required, no history of coronary heart disease, significant arrhythmia, peripheral arterial disease or other structural heart disease.    Revised Cardiac Risk Index (RCRI):  The patient has the following serious cardiovascular risks for  perioperative complications:   - Diabetes Mellitus (on Insulin) = 1 point     RCRI Interpretation: 1 point: Class II (low risk - 0.9% complication rate)           Signed Electronically by: Joel Daniel Wegener, MD  Copy of this evaluation report is provided to requesting physician.      Answers for HPI/ROS submitted by the patient on 5/1/2023  If you checked off any problems, how difficult have these problems made it for you to do your work, take care of things at home, or get along with other people?: Very difficult  PHQ9 TOTAL SCORE: 4  JATIN 7 TOTAL SCORE: 0

## 2023-05-01 NOTE — LETTER
Opioid / Opioid Plus Controlled Substance Agreement    This is an agreement between you and your provider about the safe and appropriate use of controlled substance/opioids prescribed by your care team. Controlled substances are medicines that can cause physical and mental dependence (abuse).    There are strict laws about having and using these medicines. We here at [facility] are committing to working with you in your efforts to get better. To support you in this work, we ll help you schedule regular office appointments for medicine refills. If we must cancel or change your appointment for any reason, we ll make sure you have enough medicine to last until your next appointment.     As a Provider, I will:  Listen carefully to your concerns and treat you with respect.   Recommend a treatment plan that I believe is in your best interest. This plan may involve therapies other than opioid pain medication.   Talk with you often about the possible benefits, and the risk of harm of any medicine that we prescribe for you.   Provide a plan on how to taper (discontinue or go off) using this medicine if the decision is made to stop its use.    As a Patient, I understand that opioid(s):   Are a controlled substance prescribed by my care team to help me function or work and manage my condition(s).   Are strong medicines and can cause serious side effects such as:  Drowsiness, which can seriously affect my driving ability  A lower breathing rate, enough to cause death  Harm to my thinking ability   Depression   Abuse of and addiction to this medicine  Need to be taken exactly as prescribed. Combining opioids with certain medicines or chemicals (such as illegal drugs, sedatives, sleeping pills, and benzodiazepines) can be dangerous or even fatal. If I stop opioids suddenly, I may have severe withdrawal symptoms.  Do not work for all types of pain nor for all patients. If they re not helpful, I may be asked to stop  Weeks 32 to 34 of Your Pregnancy: Care Instructions  Your Care Instructions    During the last few weeks of your pregnancy, you may have more aches and pains. It's important to rest when you can. Your growing baby is putting more pressure on your bladder. So you may need to urinate more often. Hemorrhoids are also common. These are painful, itchy veins in the rectal area. In the 36th week, most women have a test for group B streptococcus (GBS). GBS is a common bacteria that can live in the vagina and rectum. It can make your baby sick after birth. If you test positive, you will get antibiotics during labor. These will keep your baby from getting the bacteria. You may want to talk with your doctor about banking your baby's umbilical cord blood. This is the blood left in the cord after birth. If you want to save this blood, you must arrange it ahead of time. You can't decide at the last minute. If you haven't already had the Tdap shot during this pregnancy, talk to your doctor about getting it. It will help protect your  against pertussis infection. Follow-up care is a key part of your treatment and safety. Be sure to make and go to all appointments, and call your doctor if you are having problems. It's also a good idea to know your test results and keep a list of the medicines you take. How can you care for yourself at home? Ease hemorrhoids  · Get more liquids, fruits, vegetables, and fiber in your diet. This will help keep your stools soft. · Avoid sitting for too long. Lie on your left side several times a day. · Clean yourself with soft, moist toilet paper. Or you can use witch hazel pads or personal hygiene pads. · If you are uncomfortable, try ice packs. Or you can sit in a warm sitz bath. Do these for 20 minutes at a time, as needed. · Use hydrocortisone cream for pain and itching. Two examples are Anusol and Preparation H Hydrocortisone.   · Ask your doctor about taking an over-the-counter stool softener. Consider breastfeeding  · Experts recommend that women breastfeed for 1 year or longer. Breast milk is the perfect food for babies. · Breast milk is easier for babies to digest than formula. And it is always available, just the right temperature, and free. · In general, babies who are  are healthier than formula-fed babies. ¨  babies are less likely to get ear infections, colds, diarrhea, and pneumonia. ¨  babies who are fed only breast milk are less likely to get asthma and allergies. ¨  babies are less likely to be obese. ¨  babies are less likely to get diabetes or heart disease. · Women who breastfeed have less bleeding after the birth. Their uteruses also shrink back faster. · Some women who breastfeed lose weight faster. Making milk burns calories. · Breastfeeding can lower your risk of breast cancer, ovarian cancer, and osteoporosis. Decide about circumcision for boys  · As you make this decision, it may help to think about your personal, Sabianist, and family traditions. You get to decide if you will keep your son's penis natural or if he will be circumcised. · If you decide that you would like to have your baby circumcised, talk with your doctor. You can share your concerns about pain. And you can discuss your preferences for anesthesia. Where can you learn more? Go to http://sowmya-marisol.info/. Enter T986 in the search box to learn more about \"Weeks 32 to 34 of Your Pregnancy: Care Instructions. \"  Current as of: March 16, 2017  Content Version: 11.3  © 7601-5769 [employer]. Care instructions adapted under license by [employer] (which disclaims liability or warranty for this information).  If you have questions about a medical condition or this instruction, always ask your healthcare professional. [place] [address] any warranty or liability for your use of them.        The risks, benefits and side effects of these medicine(s) were explained to me. I agree that:  I will take part in other treatments as advised by my care team. This may be psychiatry or counseling, physical therapy, behavioral therapy, group treatment or a referral to a specialist.     I will keep all my appointments. I understand that this is part of the monitoring of opioids. My care team may require an office visit for EVERY opioid/controlled substance refill. If I miss appointments or don t follow instructions, my care team may stop my medicine.    I will take my medicines as prescribed. I will not change the dose or schedule unless my care team tells me to. There will be no refills if I run out early.     I may be asked to come to the clinic and complete a urine drug test or complete a pill count at any time. If I don t give a urine sample or participate in a pill count, the care team may stop my medicine.    I will only receive prescriptions from this clinic for chronic pain. If I am treated by another provider for acute pain issues, I will tell them that I am taking opioid pain medication for chronic pain and that I have a treatment agreement with this provider. I will inform my [facility] care team within one business day if I am given a prescription for any pain medication by another healthcare provider. My [facility] care team can contact other providers and pharmacists about my use of any medicines.    It is up to me to make sure that I don t run out of my medicines on weekends or holidays. If my care team is willing to refill my opioid prescription without a visit, I must request refills only during office hours. Refills may take up to 3 business days to process. I will use one pharmacy to fill all my opioid and other controlled substance prescriptions. I will notify the clinic about any changes to my insurance or medication availability.    I am responsible for my  prescriptions. If the medicine/prescription is lost, stolen or destroyed, it will not be replaced. I also agree not to share controlled substance medicines with anyone.    I am aware I should not use any illegal or recreational drugs. I agree not to drink alcohol unless my care team says I can.       If I enroll in the [place] [facility] program, I will tell my care team prior to my next refill.     I will tell my care team right away if I become pregnant, have a new medical problem treated outside of my regular clinic, or have a change in my medications.    I understand that this medicine can affect my thinking, judgment and reaction time. Alcohol and drugs affect the brain and body, which can affect the safety of my driving. Being under the influence of alcohol or drugs can affect my decision-making, behaviors, personal safety, and the safety of others. Driving while impaired (DWI) can occur if a person is driving, operating, or in physical control of a car, motorcycle, boat, snowmobile, ATV, motorbike, off-road vehicle, or any other motor vehicle ([place] Statute 169A.20). I understand the risk if I choose to drive or operate any vehicle or machinery.    I understand that if I do not follow any of the conditions above, my prescriptions or treatment may be stopped or changed.          Opioids  What You Need to Know    What are opioids?   Opioids are pain medicines that must be prescribed by a doctor. They are also known as narcotics.     Examples are:   morphine (MS Contin, Parvin)  oxycodone (Oxycontin)  oxycodone and acetaminophen (Percocet)  hydrocodone and acetaminophen (Vicodin, Norco)   fentanyl patch (Duragesic)   hydromorphone (Dilaudid)   methadone  codeine (Tylenol #3)     What do opioids do well?   Opioids are best for severe short-term pain such as after a surgery or injury. They may work well for cancer pain. They may help some people with long-lasting (chronic) pain.     What do opioids NOT do  this information. well?   Opioids never get rid of pain entirely, and they don t work well for most patients with chronic pain. Opioids don t reduce swelling, one of the causes of pain.                                    Other ways to manage chronic pain and improve function include:     Treat the health problem that may be causing pain  Anti-inflammation medicines, which reduce swelling and tenderness, such as ibuprofen (Advil, Motrin) or naproxen (Aleve)  Acetaminophen (Tylenol)  Antidepressants and anti-seizure medicines, especially for nerve pain  Topical treatments such as patches or creams  Injections or nerve blocks  Chiropractic or osteopathic treatment  Acupuncture, massage, deep breathing, meditation, visual imagery, aromatherapy  Use heat or ice at the pain site  Physical therapy   Exercise  Stop smoking  Take part in therapy       Risks and side effects     Talk to your doctor before you start or decide to keep taking opioids. Possible side effects include:    Lowering your breathing rate enough to cause death  Overdose, including death, especially if taking higher than prescribed doses  Worse depression symptoms; less pleasure in things you usually enjoy  Feeling tired or sluggish  Slower thoughts or cloudy thinking  Being more sensitive to pain over time; pain is harder to control  Trouble sleeping or restless sleep  Changes in hormone levels (for example, less testosterone)  Changes in sex drive or ability to have sex  Constipation  Unsafe driving  Itching and sweating  Dizziness  Nausea, throwing up and dry mouth    What else should I know about opioids?    Opioids may lead to dependence, tolerance, or addiction.    Dependence means that if you stop or reduce the medicine too quickly, you will have withdrawal symptoms. These include loose poop (diarrhea), jitters, flu-like symptoms, nervousness and tremors. Dependence is not the same as addiction.                     Tolerance means needing higher doses over time to  get the same effect. This may increase the chance of serious side effects.    Addiction is when people improperly use a substance that harms their body, their mind or their relations with others. Use of opiates can cause a relapse of addiction if you have a history of drug or alcohol abuse.    People who have used opioids for a long time may have a lower quality of life, worse depression, higher levels of pain and more visits to doctors.    You can overdose on opioids. Take these steps to lower your risk of overdose:    Recognize the signs:  Signs of overdose include decrease or loss of consciousness (blackout), slowed breathing, trouble waking up and blue lips. If someone is worried about overdose, they should call 911.    Talk to your doctor about Narcan (naloxone).   If you are at risk for overdose, you may be given a prescription for Narcan. This medicine very quickly reverses the effects of opioids.   If you overdose, a friend or family member can give you Narcan while waiting for the ambulance. They need to know the signs of overdose and how to give Narcan.     Don't use alcohol or street drugs.   Taking them with opioids can cause death.    Do not take any of these medicines unless your doctor says it s OK. Taking these with opioids can cause death:  Benzodiazepines, such as lorazepam (Ativan), alprazolam (Xanax) or diazepam (Valium)  Muscle relaxers, such as cyclobenzaprine (Flexeril)  Sleeping pills like zolpidem (Ambien)   Other opioids      How to keep you and other people safe while taking opioids:    Never share your opioids with others.  Opioid medicines are regulated by the Drug Enforcement Agency (SHAWNA). Selling or sharing medications is a criminal act.    2. Be sure to store opioids in a secure place, locked up if possible. Young children can easily swallow them and overdose.    3. When you are traveling with your medicines, keep them in the original bottles. If you use a pill box, be sure you also  carry a copy of your medicine list from your clinic or pharmacy.    4. Safe disposal of opioids    Most pharmacies have places to get rid of medicine, called disposal kiosks. Medicine disposal options are also available in every [facility]. Search your county and  medication disposal  to find more options. You can find more details at:  [email or web address]://[email or web address]-[email or web address]-unwanted-medications     I agree that my provider, clinic care team, and pharmacy may work with any city, state or federal law enforcement agency that investigates the misuse, sale, or other diversion of my controlled medicine. I will allow my provider to discuss my care with, or share a copy of, this agreement with any other treating provider, pharmacy or emergency room where I receive care.    I have read this agreement and have asked questions about anything I did not understand.    _______________________________________________________  Patient Signature - Erwin Aguilar _____________________                   Date     _______________________________________________________  Provider Signature - Joel Daniel Wegener, MD   _____________________                   Date     _______________________________________________________  Witness Signature (required if provider not present while patient signing)   _____________________                   Date

## 2023-05-04 ENCOUNTER — OFFICE VISIT (OUTPATIENT)
Dept: ANESTHESIOLOGY | Facility: CLINIC | Age: 63
End: 2023-05-04
Attending: FAMILY MEDICINE
Payer: MEDICARE

## 2023-05-04 ENCOUNTER — MYC MEDICAL ADVICE (OUTPATIENT)
Dept: FAMILY MEDICINE | Facility: CLINIC | Age: 63
End: 2023-05-04

## 2023-05-04 VITALS
BODY MASS INDEX: 30.38 KG/M2 | SYSTOLIC BLOOD PRESSURE: 151 MMHG | OXYGEN SATURATION: 100 % | WEIGHT: 217 LBS | HEART RATE: 68 BPM | DIASTOLIC BLOOD PRESSURE: 78 MMHG | HEIGHT: 71 IN

## 2023-05-04 DIAGNOSIS — M54.50 CHRONIC LOW BACK PAIN, UNSPECIFIED BACK PAIN LATERALITY, UNSPECIFIED WHETHER SCIATICA PRESENT: ICD-10-CM

## 2023-05-04 DIAGNOSIS — Z96.89 S/P INSERTION OF SPINAL CORD STIMULATOR: ICD-10-CM

## 2023-05-04 DIAGNOSIS — M79.671 INTRACTABLE RIGHT HEEL PAIN: ICD-10-CM

## 2023-05-04 DIAGNOSIS — S34.3XXS CAUDA EQUINA SPINAL CORD INJURY, SEQUELA: ICD-10-CM

## 2023-05-04 DIAGNOSIS — M47.26 OSTEOARTHRITIS OF SPINE WITH RADICULOPATHY, LUMBAR REGION: Primary | ICD-10-CM

## 2023-05-04 DIAGNOSIS — F43.22 ADJUSTMENT DISORDER WITH ANXIOUS MOOD: ICD-10-CM

## 2023-05-04 DIAGNOSIS — G89.29 CHRONIC PAIN OF RIGHT ANKLE: ICD-10-CM

## 2023-05-04 DIAGNOSIS — G89.29 CHRONIC LOW BACK PAIN, UNSPECIFIED BACK PAIN LATERALITY, UNSPECIFIED WHETHER SCIATICA PRESENT: ICD-10-CM

## 2023-05-04 DIAGNOSIS — F10.21 ALCOHOLISM IN REMISSION (H): ICD-10-CM

## 2023-05-04 DIAGNOSIS — M54.17 LUMBOSACRAL RADICULOPATHY AT S1: Primary | ICD-10-CM

## 2023-05-04 DIAGNOSIS — M65.28 CALCIFIC ACHILLES TENDINITIS: ICD-10-CM

## 2023-05-04 DIAGNOSIS — M25.571 CHRONIC PAIN OF RIGHT ANKLE: ICD-10-CM

## 2023-05-04 DIAGNOSIS — M21.6X1 ACQUIRED CALCANEUS DEFORMITY OF RIGHT ANKLE: ICD-10-CM

## 2023-05-04 DIAGNOSIS — E11.42 TYPE 2 DIABETES MELLITUS WITH DIABETIC POLYNEUROPATHY, WITH LONG-TERM CURRENT USE OF INSULIN (H): ICD-10-CM

## 2023-05-04 DIAGNOSIS — Z98.890 H/O FOOT SURGERY: ICD-10-CM

## 2023-05-04 DIAGNOSIS — Z79.4 TYPE 2 DIABETES MELLITUS WITH DIABETIC POLYNEUROPATHY, WITH LONG-TERM CURRENT USE OF INSULIN (H): ICD-10-CM

## 2023-05-04 PROCEDURE — 99204 OFFICE O/P NEW MOD 45 MIN: CPT | Performed by: ANESTHESIOLOGY

## 2023-05-04 RX ORDER — GABAPENTIN 300 MG/1
CAPSULE ORAL
Qty: 90 CAPSULE | Refills: 0 | Status: SHIPPED | OUTPATIENT
Start: 2023-05-04 | End: 2023-06-05

## 2023-05-04 ASSESSMENT — PAIN SCALES - GENERAL: PAINLEVEL: WORST PAIN (10)

## 2023-05-04 NOTE — LETTER
5/4/2023       RE: Erwin Aguilar  [address]  [place] [address]       Dear Colleague,    Thank you for referring your patient, Erwin Aguilar, to the [facility] [place] at [facility]. Please see a copy of my visit note below.    [facility] : Consultation Note    Patient: Erwin Aguilar Age: 62 year old   MRN: [id number] Referred by:  Wegener     Date of Visit: May 4, 2023    Reason for consultation:    Erwin Aguilar is a 62 year old male who is seen in consultation today at the request of his provider,Dr. Wegener for a comprehensive evaluation and management of pain.  Primary Care Provider is Wegener, Joel Daniel Irwin.      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   He has a long history of chronic low back pain.  In early 1990s, he had lower back surgery subsequently in 2009 he had another surgery by Dr. Stein due to cauda equina syndrome.  In 2010, he underwent SCS by Dr. Garcia at [facility] (currently [facility]), which afforded significant pain relief.  He had revision of SCS  by Dr. Sauceda in 2018.  Unfortunately he has not been getting good coverage of the SCS now.  He has seen Dr. Garcia in May 2021 and had SCS reprogrammed.  The top of the left SCS lead is at the T11 and top of the right SCS lead a at T12.  He also established with a pain psychologist for his underlying anxiety and depression.  He has been certified for medical cannabis and has been using consistently with some benefit.     As a sequela of the previous spine surgeries, he developed right foot drop.  He  also had Achilles tendon repair in December 2020 and had a complicated recovery and needed wound VAC for 3 months postop. At some point, he was diagnosed with CRPS.  Then he had bony spur on the right ankle for which he had surgery.  Since then he developed right ankle/foot pain.  Currently this  pain is most bothersome.  However, he noted new onset of radicular pain starting in the right hamstring, right side of the leg down to the dorsum of the foot.      [place] Prescription Monitoring Program:   Reviewed. No concerns  He coded again he is on bypass  Review of Systems:  ROS    Patient Supplied Answers To the  Pain Questionnaire      5/4/2021    10:49 AM    Pain -  Patient Entered Questionnaire/Answers   What number best describes your pain right now:  0 = No pain  to  10 = Worst pain imaginable 7   How would you describe the pain other   Which of the following worsen your pain walking    exercise   Which of the following improve or reduce your pain lying down    standing    sitting   What number best describes your average pain for the past week:  0 = No pain  to  10 = Worst pain imaginable 7   What number best describes your LOWEST pain in past 24 hours:  0 = No pain  to  10 = Worst pain imaginable 5   What number best describes your WORST pain in past 24 hours:  0 = No pain  to  10 = Worst pain imaginable 10   When is your pain worst Constant   What non-medicine treatments have you already had for your pain pain clinic    physical therapy    spinal cord stimulator   Have you tried treating your pain with medication?  No   Are you currently taking medications for your pain? No                11/18/2021     6:13 AM 12/28/2022     6:55 PM 5/1/2023     1:45 PM   JATIN-7 SCORE   Total Score 2 (minimal anxiety) 4 (minimal anxiety) 0 (minimal anxiety)   Total Score 2 4 0    0            1/2/2023    10:29 AM 3/10/2022    10:43 AM   PHQ-2 ( 1999 [employer])   Q1: Little interest or pleasure in doing things 1 1   Q2:  Feeling down, depressed or hopeless 0 0   PHQ-2 Score 1 1   Q1: Little interest or pleasure in doing things Several days Several days   Q2: Feeling down, depressed or hopeless Not at all Not at all   PHQ-2 Score 1 1            9/29/2022    10:43 AM 1/2/2023    10:24 AM 5/1/2023     1:41 PM   [facility] Follow-up to PHQ   PHQ-9 9. Suicide Ideation past 2 weeks Not at all Not at all Not at all          Past Medical History:  Past Medical History:   Diagnosis Date    Actinic keratosis     aldara    Anxiety     Cancer (H)     squamous cell skin CA    Cauda equina spinal cord injury (H)     Chronic sinusitis 5-1-16    Depressive disorder     Diastasis recti     Esophageal reflux     Esophageal varices in cirrhosis (H) 4/1/2014    Hospitalized for UGI blee 3/28/14, endoscopy revealed bleeding varices.    Essential hypertension, benign     Intermittent asthma     Mild depression     Mixed hyperlipidemia     Nasal polyps 5-1-16    Osteoarthritis of lumbar spine, unspecified spinal osteoarthritis complication status     Other chronic pain     Paroxysmal atrial fibrillation (H) 9/22/2021    SCCA (squamous cell carcinoma) of skin     Seasonal allergic conjunctivitis     Type II or unspecified type diabetes mellitus without mention of complication, not stated as uncontrolled     Unspecified site of spinal cord injury without evidence of spinal bone injury     due to back surgery       Past Surgical History:  Past Surgical History:   Procedure Laterality Date    ABDOMEN SURGERY  2014    BACK SURGERY  August 2009    COLONOSCOPY N/A 5/12/2016    Procedure: COMBINED COLONOSCOPY, SINGLE OR MULTIPLE BIOPSY/POLYPECTOMY BY BIOPSY;  Surgeon: Ana Paula Urbina MD;  Location:  GI    ENDOSCOPY UPPER, COLONOSCOPY, COMBINED  10/19/2011    Procedure:COMBINED ENDOSCOPY UPPER, COLONOSCOPY; Upper Endoscopy, Colonoscopy with Polypectomy x4; Surgeon:AMBAR RODRÍGUEZ; Location:[facility] OR    ENT SURGERY  1-2016    Ongoing since then     ESOPHAGOSCOPY, GASTROSCOPY, DUODENOSCOPY (EGD), COMBINED  3/28/2014    Procedure: COMBINED ESOPHAGOSCOPY, GASTROSCOPY, DUODENOSCOPY (EGD);  EGD, Gastric Biopsies, Esophageal Banding;  Surgeon: Reyna Tovar MD;  Location: [facility] OR    ESOPHAGOSCOPY, GASTROSCOPY, DUODENOSCOPY (EGD), COMBINED  6/9/2014    Procedure: COMBINED ESOPHAGOSCOPY, GASTROSCOPY, DUODENOSCOPY (EGD);  Surgeon: Curtis Mendez MD;  Location: [facility] GI    ESOPHAGOSCOPY, GASTROSCOPY, DUODENOSCOPY (EGD), COMBINED  7/24/2014    Procedure: COMBINED ESOPHAGOSCOPY, GASTROSCOPY, DUODENOSCOPY (EGD);  Surgeon: Gerard Samaniego MD;  Location: [facility] OR    ESOPHAGOSCOPY, GASTROSCOPY, DUODENOSCOPY (EGD), COMBINED N/A 10/31/2014    Procedure: COMBINED ESOPHAGOSCOPY, GASTROSCOPY, DUODENOSCOPY (EGD);  Surgeon: Gerard Samaniego MD;  Location: [facility] OR    ESOPHAGOSCOPY, GASTROSCOPY, DUODENOSCOPY (EGD), COMBINED N/A 5/12/2016    Procedure: COMBINED ESOPHAGOSCOPY, GASTROSCOPY, DUODENOSCOPY (EGD);  Surgeon: Ana Paula Urbina MD;  Location: [facility] GI    ESOPHAGOSCOPY, GASTROSCOPY, DUODENOSCOPY (EGD), COMBINED N/A 8/2/2018    Procedure: COMBINED ESOPHAGOSCOPY, GASTROSCOPY, DUODENOSCOPY (EGD);  EGD;  Surgeon: Yu Wagner MD;  Location: [facility] GI    HCL SQUAMOUS CELL CARCINOMA AG  10/07    left forearm    HERNIORRHAPHY UMBILICAL  11/8/2012    Procedure: HERNIORRHAPHY UMBILICAL;  Open Umbilical Hernia Repair With Mesh ;  Surgeon: Chase Nicholson MD;  Location: [facility] OR    INSERT STIMULATOR DORSAL COLUMN N/A 6/28/2018    Procedure: INSERT STIMULATOR DORSAL COLUMN;;  Surgeon: Elvis Sauceda MD;  [facility] OR    neuro stimulator  2010    REMOVE GENERATOR STIMULATOR (LOCATION) N/A 6/28/2018    Procedure: REMOVE GENERATOR STIMULATOR (LOCATION);  Spinal Cord Stimulator and IPG Explant and Re-Implant of SCS System (Leads and IPG);  Surgeon: Elvis Sauceda MD;  [facility]    REPAIR TENDON ACHILLES Right  11/11/2020    Procedure: Right achilles debridement and partial calcaneus excision;  Surgeon: Eh Sandoval MD;  Location: [facility] OR    SURGICAL HISTORY OF -   1/02    abcess tooth    SURGICAL HISTORY OF -   1999    L4-5 laminectomy, cauda equina syndrome    SURGICAL HISTORY OF -   +    herniated disk repair    TONSILLECTOMY  10 1964    TRANSPOSITION ULNAR NERVE (ELBOW)      right    ZZC APPENDECTOMY  1974       Medications:  Current Outpatient Medications   Medication Sig Dispense Refill    ACE/ARB NOT PRESCRIBED, INTENTIONAL, ACE & ARB not prescribed due to Allergy      albuterol (PROAIR HFA/PROVENTIL HFA/VENTOLIN HFA) 108 (90 Base) MCG/ACT inhaler INHALE 2 PUFFS BY MOUTH EVERY 6 HOURS AS NEEDED FOR SHORTNESS OF BREATH/DYSPNEA OR WHEEZING 18 g 1    apixaban ANTICOAGULANT (ELIQUIS ANTICOAGULANT) 5 MG tablet Take 1 tablet (5 mg) by mouth 2 times daily 180 tablet 3    baclofen (LIORESAL) 10 MG tablet TAKE 2 TABLETS BY MOUTH THREE TIMES DAILY 180 tablet 3    doxazosin (CARDURA) 8 MG tablet TAKE 1 TABLET (8 MG) BY MOUTH AT BEDTIME 90 tablet 1    GLOBAL EASE INJECT PEN NEEDLES 32G X 4 MM miscellaneous USE AS DIRECTED EVERY  each 1    insulin glargine (LANTUS PEN) 100 UNIT/ML pen Inject 26 Units Subcutaneous At Bedtime 30 mL 3    medical cannabis (Patient's own supply) See Admin Instructions (The purpose of this order is to document that the patient reports taking medical cannabis.  This is not a prescription, and is not used to certify that the patient has a qualifying medical condition.)      oxyCODONE (ROXICODONE) 5 MG tablet Take 1 tablet (5 mg) by mouth 3 times daily for 7 days 21 tablet 0    promethazine (PHENERGAN) 25 MG tablet TAKE 1 TABLET(25 MG) BY MOUTH THREE TIMES DAILY AS NEEDED FOR NAUSEA 90 tablet 4    propranolol (INDERAL) 40 MG tablet Take 2 tablets (80 mg) by mouth 2 times daily 360 tablet 3    rosuvastatin (CRESTOR) 20 MG tablet Take 1 tablet (20 mg) by mouth daily 90 tablet 3     sertraline (ZOLOFT) 100 MG tablet TAKE 2 TABLETS BY MOUTH DAILY 180 tablet 3    triamterene-HCTZ (DYAZIDE) 37.5-25 MG capsule Take 2 capsules by mouth every morning 180 capsule 3         Medications related to Pain Management:   Medications related to Pain Management (From now, onward)      None            Allergies:       Allergies   Allergen Reactions    Levaquin Anaphylaxis and Rash     Swelling in lip and tongue felt thick    Lisinopril Anaphylaxis     Swollen tongue; inability to swallow; drooling; hives; swollen face, neck, angioedema    Acetaminophen      Hx of cirrhosis     Amlodipine      Swelling, hives, possible angioedema      Morphine      b/p dropped and arms went numb  Hypotension    Quinolones Hives    Spironolactone Unknown     Pt believes himself to be allergic to it; cannot find his old records of this.-mjc     Bactrim [Sulfamethoxazole-Trimethoprim] Rash       Social History:    Social History     Socioeconomic History    Marital status: Single     Spouse name: Not on file    Number of children: 0    Years of education: Not on file    Highest education level: Not on file   Occupational History    Occupation: sales     Employer: UNEMPLOYED   Tobacco Use    Smoking status: Former     Packs/day: 0.00     Years: 1.00     Pack years: 0.00     Types: Cigarettes     Start date: 10/13/1983     Quit date: 1984     Years since quittin.9    Smokeless tobacco: Never   Vaping Use    Vaping status: Never Used   Substance and Sexual Activity    Alcohol use: No     Alcohol/week: 0.0 standard drinks of alcohol     Comment: a pint of alcohol / day - last drink was 3/28/14    Drug use: Yes     Frequency: 2.0 times per week     Types: Marijuana     Comment: medical marijuana - occasional    Sexual activity: Not Currently     Partners: Female     Birth control/protection: Abstinence   Other Topics Concern    Parent/sibling w/ CABG, MI or angioplasty before 65F 55M? No   Social History Narrative    Not on  "file     Social Determinants of Health     Financial Resource Strain: Not on file   Food Insecurity: Not on file   Transportation Needs: No Transportation Needs (12/31/2020)    PRAPARE - Transportation     Lack of Transportation (Medical): No     Lack of Transportation (Non-Medical): No   Physical Activity: Inactive (12/31/2020)    Exercise Vital Sign     Days of Exercise per Week: 0 days     Minutes of Exercise per Session: 0 min   Stress: Not on file   Social Connections: Unknown (12/31/2020)    Social Connection and Isolation Panel [NHANES]     Frequency of Communication with Friends and Family: Not on file     Frequency of Social Gatherings with Friends and Family: Not on file     Attends [employer] Services: Not on file     Active Member of Clubs or Organizations: Not on file     Attends Club or Organization Meetings: Not on file     Marital Status:    Intimate Partner Violence: Not on file   Housing Stability: Not on file     Social History     Social History Narrative    Not on file         Family history:  Family History   Problem Relation Age of Onset    Cancer Mother         lung    Breast Cancer Mother     Other Cancer Mother     Cancer Father         esophogeal, GERD    Snoring Father     Diabetes Brother         amputation, Type 1    Diabetes Brother     Diabetes Brother     Cancer - colorectal No family hx of     Glaucoma No family hx of     Macular Degeneration No family hx of          Physical Exam:  Vitals:    05/04/23 0912   BP: (!) 151/78   BP Location: Right arm   Patient Position: Chair   Cuff Size: Adult Large   Pulse: 68   SpO2: 100%   Weight: 98.4 kg (217 lb)   Height: 1.803 m (5' 11\")       General: Awake in no apparent distress.   Eyes: Sclerae are anicteric. PERRLA, EOMI   Neck: supple, no masses.   Lungs: unlabored.   Heart: regular rate and rhythm   Abdomen: soft non tender.  Extremities: Pulses are well palpable, no peripheral edema.   Musculoskeletal: No tenderness to palpation " noted on the lower back.  Sacroiliac joints are nontender.  Kurtis's test is negative.  Leg raise test limited to 30 degrees on the right.  Leg raise test is negative on the left.  Patient is unable to perform heel or toe walking on the right foot.  Hip flexion and knee extension 3 out of 5 on the right lower extremity and 5 out of 5 in the left lower extremity..   Neurologic exam: Sensation to light touch diminished on the right lower extremity.  Psychiatric; Normal affect.   Skin: Warm and Dry.       LABORATORY VALUES:   Recent Labs   Lab Test 05/01/23  1351 10/21/22  1006    135*   POTASSIUM 3.9 3.6   CHLORIDE 102 99   CO2 25 27   ANIONGAP 12 9   GLC 85 123*   BUN 13.2 11.8   CR 0.78 0.75   [place] 9.5 9.5       CBC RESULTS:   Recent Labs   Lab Test 07/16/22  1342   WBC 9.0   RBC 4.50   HGB 14.0   HCT 39.4*   MCV 88   MCH 31.1   MCHC 35.5   RDW 12.6          Most Recent 3 INR's:  Recent Labs   Lab Test 07/16/22  1342 07/15/22  0724 02/12/22  1113   INR 1.08 1.04 1.26*           ASSESSMENT/PLAN:                             ASSESSMENT:    Diagnoses         Codes Comments    Lumbosacral radiculopathy at S1    -  Primary M54.17     Acquired calcaneus deformity of right ankle     M21.6X1     Chronic pain of right ankle     M25.571, G89.29     Calcific Achilles tendinitis     M65.28     H/O foot surgery     Z98.890     Intractable right heel pain     M79.671     Type 2 diabetes mellitus with diabetic polyneuropathy, with long-term current use of insulin (H)     E11.42, Z79.4     Adjustment disorder with anxious mood     F43.22     S/P insertion of spinal cord stimulator     Z96.89     Alcoholism in remission (H)     F10.21              PLAN:    - Medications.     Continue medical cannabis  Baclofen 20 mg 3 times daily  Start gabapentin 300 mg 3 times daily    - Interventional procedures:    Ordered right L5-S1 epidural steroid injection.  Risk of the procedure including bleeding, infection, failed procedure,  nerve injury and paralysis discussed with the patient.    - Labs and imaging: Ordered x-ray of the thoracolumbar spine standing to evaluate lead migration.  Ordered  MRI of the lumbosacral spine for his new onset of right S1 radiculopathy pain..     - Rehab: Discussed about physical therapy but he is not interested.     - Psychology: No current needs.  He has been taking Zoloft     - Integrated medicine: We will refer him to acupuncture therapy    - Disposition: We will see him for the above-mentioned procedure.  Clinic follow-up as needed.      Assessment will be ongoing with changes in treatment as indicated.  Benefits/risks/alternatives to treatment have been reviewed and the patient has been instructed to contact this office if they have any questions or concerns.  This plan of care has been discussed with the patient and the patient is in agreement.                 Again, thank you for allowing me to participate in the care of your patient.      Sincerely,    Carlito Grimm MD

## 2023-05-04 NOTE — PATIENT INSTRUCTIONS
Medications:    gabapentin (NEURONTIN) 300 MG capsule. 300 mg at bedtime x 3 days 600 mg at bedtime X 3 days 300 mg afternoon and 600 mg at bedtime.       *Please provide the clinic with a minium of 1 week notice, on all prescription refills.       Referrals:    Acupuncture Referral.  -Please call your insurance provider to find out about acupuncture coverage, being that not all policies cover acupuncture services.       Imaging:      XR Thoracic Lumbar Standing 2 Views    MR LUMBAR SPINE W/O & W CONTRAST      IMAGING SERVICES HOURS:    All imaging modalities are available from 7 a.m. - 9 p.m. Monday through Friday  X-ray, CT, MRI, and General Ultrasound appointments are available from 7 a.m. -3:30 p.m. on Saturdays  X-ray, CT and MRI appointments are available from 8 a.m. - 4:30 p.m. on Sundays  Please call [phone] to schedule imaging exams         Recommended Follow up:      Follow up after imaging is completed.           To speak with a nurse, schedule/reschedule/cancel a clinic appointment, or request a medication refill call: [phone]    You can also reach us by [email or web address]: [email or web address]

## 2023-05-04 NOTE — PROGRESS NOTES
[facility] : Consultation Note    Patient: Erwin Aguilar Age: 62 year old   MRN: [id number] Referred by:  Wegener     Date of Visit: May 4, 2023    Reason for consultation:    Erwin Aguilar is a 62 year old male who is seen in consultation today at the request of his provider,Dr. Wegener for a comprehensive evaluation and management of pain.  Primary Care Provider is Wegener, Joel Daniel Irwin.      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   He has a long history of chronic low back pain.  In early 1990s, he had lower back surgery subsequently in 2009 he had another surgery by Dr. Stein due to cauda equina syndrome.  In 2010, he underwent SCS by Dr. Garcia at [facility] (currently [facility]), which afforded significant pain relief.  He had revision of SCS  by Dr. Sauceda in 2018.  Unfortunately he has not been getting good coverage of the SCS now.  He has seen Dr. Garcia in May 2021 and had SCS reprogrammed.  The top of the left SCS lead is at the T11 and top of the right SCS lead a at T12.  He also established with a pain psychologist for his underlying anxiety and depression.  He has been certified for medical cannabis and has been using consistently with some benefit.     As a sequela of the previous spine surgeries, he developed right foot drop.  He also had Achilles tendon repair in December 2020 and had a complicated recovery and needed wound VAC for 3 months postop. At some point, he was diagnosed with CRPS.  Then he had bony spur on the right ankle for which he had surgery.  Since then he developed right ankle/foot pain.  Currently this  pain is most bothersome.  However, he noted  new onset of radicular pain starting in the right hamstring, right side of the leg down to the dorsum of the foot.      [place] Prescription Monitoring Program:   Reviewed. No concerns  He coded again he is on bypass  Review of Systems:  ROS    Patient Supplied Answers To the  Pain Questionnaire      5/4/2021    10:49 AM    Pain -  Patient Entered Questionnaire/Answers   What number best describes your pain right now:  0 = No pain  to  10 = Worst pain imaginable 7   How would you describe the pain other   Which of the following worsen your pain walking    exercise   Which of the following improve or reduce your pain lying down    standing    sitting   What number best describes your average pain for the past week:  0 = No pain  to  10 = Worst pain imaginable 7   What number best describes your LOWEST pain in past 24 hours:  0 = No pain  to  10 = Worst pain imaginable 5   What number best describes your WORST pain in past 24 hours:  0 = No pain  to  10 = Worst pain imaginable 10   When is your pain worst Constant   What non-medicine treatments have you already had for your pain pain clinic    physical therapy    spinal cord stimulator   Have you tried treating your pain with medication?  No   Are you currently taking medications for your pain? No                11/18/2021     6:13 AM 12/28/2022     6:55 PM 5/1/2023     1:45 PM   JATIN-7 SCORE   Total Score 2 (minimal anxiety) 4 (minimal anxiety) 0 (minimal anxiety)   Total Score 2 4 0    0            1/2/2023    10:29 AM 3/10/2022    10:43 AM   PHQ-2 ( 1999 [employer])   Q1: Little interest or pleasure in doing things 1 1   Q2: Feeling down, depressed or hopeless 0 0   PHQ-2 Score 1 1   Q1: Little interest or pleasure in doing things Several days Several days   Q2: Feeling down, depressed or hopeless Not at all Not at all   PHQ-2 Score 1 1            9/29/2022    10:43 AM 1/2/2023    10:24 AM 5/1/2023     1:41 PM   [facility] Follow-up to PHQ   PHQ-9 9. Suicide Ideation  past 2 weeks Not at all Not at all Not at all          Past Medical History:  Past Medical History:   Diagnosis Date     Actinic keratosis     aldara     Anxiety      Cancer (H)     squamous cell skin CA     Cauda equina spinal cord injury (H)      Chronic sinusitis 5-1-16     Depressive disorder      Diastasis recti      Esophageal reflux      Esophageal varices in cirrhosis (H) 4/1/2014    Hospitalized for UGI blee 3/28/14, endoscopy revealed bleeding varices.     Essential hypertension, benign      Intermittent asthma      Mild depression      Mixed hyperlipidemia      Nasal polyps 5-1-16     Osteoarthritis of lumbar spine, unspecified spinal osteoarthritis complication status      Other chronic pain      Paroxysmal atrial fibrillation (H) 9/22/2021     SCCA (squamous cell carcinoma) of skin      Seasonal allergic conjunctivitis      Type II or unspecified type diabetes mellitus without mention of complication, not stated as uncontrolled      Unspecified site of spinal cord injury without evidence of spinal bone injury     due to back surgery       Past Surgical History:  Past Surgical History:   Procedure Laterality Date     ABDOMEN SURGERY  2014     BACK SURGERY  August 2009     COLONOSCOPY N/A 5/12/2016    Procedure: COMBINED COLONOSCOPY, SINGLE OR MULTIPLE BIOPSY/POLYPECTOMY BY BIOPSY;  Surgeon: Ana Paula Urbina MD;  Location:  GI     ENDOSCOPY UPPER, COLONOSCOPY, COMBINED  10/19/2011    Procedure:COMBINED ENDOSCOPY UPPER, COLONOSCOPY; Upper Endoscopy, Colonoscopy with Polypectomy x4; Surgeon:AMBAR RODRÍGUEZ; Location: OR     ENT SURGERY  1-2016    Ongoing since then     ESOPHAGOSCOPY, GASTROSCOPY, DUODENOSCOPY (EGD), COMBINED  3/28/2014    Procedure: COMBINED ESOPHAGOSCOPY, GASTROSCOPY, DUODENOSCOPY (EGD);  EGD, Gastric Biopsies, Esophageal Banding;  Surgeon: Reyna Tovar MD;  Location:  OR     ESOPHAGOSCOPY, GASTROSCOPY, DUODENOSCOPY (EGD), COMBINED  6/9/2014     Procedure: COMBINED ESOPHAGOSCOPY, GASTROSCOPY, DUODENOSCOPY (EGD);  Surgeon: Curtis Mendez MD;  Location: [facility]     ESOPHAGOSCOPY, GASTROSCOPY, DUODENOSCOPY (EGD), COMBINED  7/24/2014    Procedure: COMBINED ESOPHAGOSCOPY, GASTROSCOPY, DUODENOSCOPY (EGD);  Surgeon: Gerard Samaniego MD;  Location: [facility] OR     ESOPHAGOSCOPY, GASTROSCOPY, DUODENOSCOPY (EGD), COMBINED N/A 10/31/2014    Procedure: COMBINED ESOPHAGOSCOPY, GASTROSCOPY, DUODENOSCOPY (EGD);  Surgeon: Gerard Samaniego MD;  Location: [facility] OR     ESOPHAGOSCOPY, GASTROSCOPY, DUODENOSCOPY (EGD), COMBINED N/A 5/12/2016    Procedure: COMBINED ESOPHAGOSCOPY, GASTROSCOPY, DUODENOSCOPY (EGD);  Surgeon: Ana Paula Urbina MD;  Location: [facility] GI     ESOPHAGOSCOPY, GASTROSCOPY, DUODENOSCOPY (EGD), COMBINED N/A 8/2/2018    Procedure: COMBINED ESOPHAGOSCOPY, GASTROSCOPY, DUODENOSCOPY (EGD);  EGD;  Surgeon: Yu Wagner MD;  Location: [facility] GI     HCL SQUAMOUS CELL CARCINOMA AG  10/07    left forearm     HERNIORRHAPHY UMBILICAL  11/8/2012    Procedure: HERNIORRHAPHY UMBILICAL;  Open Umbilical Hernia Repair With Mesh ;  Surgeon: Chase Nicholson MD;  Location: [facility] OR     INSERT STIMULATOR DORSAL COLUMN N/A 6/28/2018    Procedure: INSERT STIMULATOR DORSAL COLUMN;;  Surgeon: Elvis Sauceda MD;  Location: [facility] OR     neuro stimulator  2010     REMOVE GENERATOR STIMULATOR (LOCATION) N/A 6/28/2018    Procedure: REMOVE GENERATOR STIMULATOR (LOCATION);  Spinal Cord Stimulator and IPG Explant and Re-Implant of SCS System (Leads and IPG);  Surgeon: Elvis Sauceda MD;  [facility]     REPAIR TENDON ACHILLES Right 11/11/2020    Procedure: Right achilles debridement and partial calcaneus excision;  Surgeon: Eh Sandoval MD;  Location: [facility] OR     SURGICAL HISTORY OF -   1/02    abcess tooth     SURGICAL HISTORY OF -   1999    L4-5 laminectomy, cauda equina syndrome     SURGICAL HISTORY OF -   +     herniated disk repair     TONSILLECTOMY  10 1964     TRANSPOSITION ULNAR NERVE (ELBOW)      right     ZZC APPENDECTOMY  1974       Medications:  Current Outpatient Medications   Medication Sig Dispense Refill     ACE/ARB NOT PRESCRIBED, INTENTIONAL, ACE & ARB not prescribed due to Allergy       albuterol (PROAIR HFA/PROVENTIL HFA/VENTOLIN HFA) 108 (90 Base) MCG/ACT inhaler INHALE 2 PUFFS BY MOUTH EVERY 6 HOURS AS NEEDED FOR SHORTNESS OF BREATH/DYSPNEA OR WHEEZING 18 g 1     apixaban ANTICOAGULANT (ELIQUIS ANTICOAGULANT) 5 MG tablet Take 1 tablet (5 mg) by mouth 2 times daily 180 tablet 3     baclofen (LIORESAL) 10 MG tablet TAKE 2 TABLETS BY MOUTH THREE TIMES DAILY 180 tablet 3     doxazosin (CARDURA) 8 MG tablet TAKE 1 TABLET (8 MG) BY MOUTH AT BEDTIME 90 tablet 1     GLOBAL EASE INJECT PEN NEEDLES 32G X 4 MM miscellaneous USE AS DIRECTED EVERY  each 1     insulin glargine (LANTUS PEN) 100 UNIT/ML pen Inject 26 Units Subcutaneous At Bedtime 30 mL 3     medical cannabis (Patient's own supply) See Admin Instructions (The purpose of this order is to document that the patient reports taking medical cannabis.  This is not a prescription, and is not used to certify that the patient has a qualifying medical condition.)       oxyCODONE (ROXICODONE) 5 MG tablet Take 1 tablet (5 mg) by mouth 3 times daily for 7 days 21 tablet 0     promethazine (PHENERGAN) 25 MG tablet TAKE 1 TABLET(25 MG) BY MOUTH THREE TIMES DAILY AS NEEDED FOR NAUSEA 90 tablet 4     propranolol (INDERAL) 40 MG tablet Take 2 tablets (80 mg) by mouth 2 times daily 360 tablet 3     rosuvastatin (CRESTOR) 20 MG tablet Take 1 tablet (20 mg) by mouth daily 90 tablet 3     sertraline (ZOLOFT) 100 MG tablet TAKE 2 TABLETS BY MOUTH DAILY 180 tablet 3     triamterene-HCTZ (DYAZIDE) 37.5-25 MG capsule Take 2 capsules by mouth every morning 180 capsule 3         Medications related to Pain Management:   Medications related to Pain Management (From now, onward)     None          Allergies:       Allergies   Allergen Reactions     Levaquin Anaphylaxis and Rash     Swelling in lip and tongue felt thick     Lisinopril Anaphylaxis     Swollen tongue; inability to swallow; drooling; hives; swollen face, neck, angioedema     Acetaminophen      Hx of cirrhosis      Amlodipine      Swelling, hives, possible angioedema       Morphine      b/p dropped and arms went numb  Hypotension     Quinolones Hives     Spironolactone Unknown     Pt believes himself to be allergic to it; cannot find his old records of this.-mjc      Bactrim [Sulfamethoxazole-Trimethoprim] Rash       Social History:    Social History     Socioeconomic History     Marital status: Single     Spouse name: Not on file     Number of children: 0     Years of education: Not on file     Highest education level: Not on file   Occupational History     Occupation: sales     Employer: UNEMPLOYED   Tobacco Use     Smoking status: Former     Packs/day: 0.00     Years: 1.00     Pack years: 0.00     Types: Cigarettes     Start date: 10/13/1983     Quit date: 1984     Years since quittin.9     Smokeless tobacco: Never   Vaping Use     Vaping status: Never Used   Substance and Sexual Activity     Alcohol use: No     Alcohol/week: 0.0 standard drinks of alcohol     Comment: a pint of alcohol / day - last drink was 3/28/14     Drug use: Yes     Frequency: 2.0 times per week     Types: Marijuana     Comment: medical marijuana - occasional     Sexual activity: Not Currently     Partners: Female     Birth control/protection: Abstinence   Other Topics Concern     Parent/sibling w/ CABG, MI or angioplasty before 65F 55M? No   Social History Narrative     Not on file     Social Determinants of Health     Financial Resource Strain: Not on file   Food Insecurity: Not on file   Transportation Needs: No Transportation Needs (2020)    PRAPARE - Transportation      Lack of Transportation (Medical): No      Lack of Transportation  "(Non-Medical): No   Physical Activity: Inactive (12/31/2020)    Exercise Vital Sign      Days of Exercise per Week: 0 days      Minutes of Exercise per Session: 0 min   Stress: Not on file   Social Connections: Unknown (12/31/2020)    Social Connection and Isolation Panel [NHANES]      Frequency of Communication with Friends and Family: Not on file      Frequency of Social Gatherings with Friends and Family: Not on file      Attends Episcopal Services: Not on file      Active Member of Clubs or Organizations: Not on file      Attends Club or Organization Meetings: Not on file      Marital Status:    Intimate Partner Violence: Not on file   Housing Stability: Not on file     Social History     Social History Narrative     Not on file         Family history:  Family History   Problem Relation Age of Onset     Cancer Mother         lung     Breast Cancer Mother      Other Cancer Mother      Cancer Father         esophogeal, GERD     Snoring Father      Diabetes Brother         amputation, Type 1     Diabetes Brother      Diabetes Brother      Cancer - colorectal No family hx of      Glaucoma No family hx of      Macular Degeneration No family hx of          Physical Exam:  Vitals:    05/04/23 0912   BP: (!) 151/78   BP Location: Right arm   Patient Position: Chair   Cuff Size: Adult Large   Pulse: 68   SpO2: 100%   Weight: 98.4 kg (217 lb)   Height: 1.803 m (5' 11\")       General: Awake in no apparent distress.   Eyes: Sclerae are anicteric. PERRLA, EOMI   Neck: supple, no masses.   Lungs: unlabored.   Heart: regular rate and rhythm   Abdomen: soft non tender.  Extremities: Pulses are well palpable, no peripheral edema.   Musculoskeletal: No tenderness to palpation noted on the lower back.  Sacroiliac joints are nontender.  Kurtis's test is negative.  Leg raise test limited to 30 degrees on the right.  Leg raise test is negative on the left.  Patient is unable to perform heel or toe walking on the right foot.  " Hip flexion and knee extension 3 out of 5 on the right lower extremity and 5 out of 5 in the left lower extremity..   Neurologic exam: Sensation to light touch diminished on the right lower extremity.  Psychiatric; Normal affect.   Skin: Warm and Dry.       LABORATORY VALUES:   Recent Labs   Lab Test 05/01/23  1351 10/21/22  1006    135*   POTASSIUM 3.9 3.6   CHLORIDE 102 99   CO2 25 27   ANIONGAP 12 9   GLC 85 123*   BUN 13.2 11.8   CR 0.78 0.75   [place] 9.5 9.5       CBC RESULTS:   Recent Labs   Lab Test 07/16/22  1342   WBC 9.0   RBC 4.50   HGB 14.0   HCT 39.4*   MCV 88   MCH 31.1   MCHC 35.5   RDW 12.6          Most Recent 3 INR's:  Recent Labs   Lab Test 07/16/22  1342 07/15/22  0724 02/12/22  1113   INR 1.08 1.04 1.26*           ASSESSMENT/PLAN:                             ASSESSMENT:    Diagnoses       Codes Comments    Lumbosacral radiculopathy at S1    -  Primary M54.17     Acquired calcaneus deformity of right ankle     M21.6X1     Chronic pain of right ankle     M25.571, G89.29     Calcific Achilles tendinitis     M65.28     H/O foot surgery     Z98.890     Intractable right heel pain     M79.671     Type 2 diabetes mellitus with diabetic polyneuropathy, with long-term current use of insulin (H)     E11.42, Z79.4     Adjustment disorder with anxious mood     F43.22     S/P insertion of spinal cord stimulator     Z96.89     Alcoholism in remission (H)     F10.21            PLAN:    - Medications.     Continue medical cannabis  Baclofen 20 mg 3 times daily  Start gabapentin 300 mg 3 times daily    - Interventional procedures:    Ordered right L5-S1 epidural steroid injection.  Risk of the procedure including bleeding, infection, failed procedure, nerve injury and paralysis discussed with the patient.    - Labs and imaging: Ordered x-ray of the thoracolumbar spine standing to evaluate lead migration.  Ordered  MRI of the lumbosacral spine for his new onset of right S1 radiculopathy pain..     -  Rehab: Discussed about physical therapy but he is not interested.     - Psychology: No current needs.  He has been taking Zoloft     - Integrated medicine: We will refer him to acupuncture therapy    - Disposition: We will see him for the above-mentioned procedure.  Clinic follow-up as needed.        Assessment will be ongoing with changes in treatment as indicated.  Benefits/risks/alternatives to treatment have been reviewed and the patient has been instructed to contact this office if they have any questions or concerns.  This plan of care has been discussed with the patient and the patient is in agreement.     Carlito Grimm MD, PHD

## 2023-05-04 NOTE — NURSING NOTE
RN reviewed AVS with patient. Patient to contact clinic if any questions/concerns. Patient verbalized understanding.    Ruchi Cherry RN

## 2023-05-05 NOTE — TELEPHONE ENCOUNTER
FLORINDA,    HECTOR    Please see [employer] message  Patient is just updating you on certain things      Thanks,  RODERICK Heath

## 2023-05-08 ENCOUNTER — MYC MEDICAL ADVICE (OUTPATIENT)
Dept: FAMILY MEDICINE | Facility: CLINIC | Age: 63
End: 2023-05-08
Payer: MEDICARE

## 2023-05-08 DIAGNOSIS — R11.0 NAUSEA WITHOUT VOMITING: ICD-10-CM

## 2023-05-08 DIAGNOSIS — Z98.890 H/O FOOT SURGERY: ICD-10-CM

## 2023-05-08 DIAGNOSIS — M79.671 INTRACTABLE RIGHT HEEL PAIN: ICD-10-CM

## 2023-05-08 DIAGNOSIS — M65.28 CALCIFIC ACHILLES TENDINITIS: ICD-10-CM

## 2023-05-08 DIAGNOSIS — M25.571 CHRONIC PAIN OF RIGHT ANKLE: ICD-10-CM

## 2023-05-08 DIAGNOSIS — K31.84 GASTROPARESIS: ICD-10-CM

## 2023-05-08 DIAGNOSIS — G89.4 CHRONIC PAIN SYNDROME: ICD-10-CM

## 2023-05-08 DIAGNOSIS — G89.29 CHRONIC PAIN OF RIGHT ANKLE: ICD-10-CM

## 2023-05-08 DIAGNOSIS — S86.011A RUPTURE OF RIGHT ACHILLES TENDON, INITIAL ENCOUNTER: ICD-10-CM

## 2023-05-08 DIAGNOSIS — M21.6X1 ACQUIRED CALCANEUS DEFORMITY OF RIGHT ANKLE: ICD-10-CM

## 2023-05-08 RX ORDER — OXYCODONE HYDROCHLORIDE 5 MG/1
5 TABLET ORAL 3 TIMES DAILY
Qty: 90 TABLET | Refills: 0 | Status: SHIPPED | OUTPATIENT
Start: 2023-05-08 | End: 2023-06-07

## 2023-05-10 ENCOUNTER — ANCILLARY PROCEDURE (OUTPATIENT)
Dept: GENERAL RADIOLOGY | Facility: CLINIC | Age: 63
End: 2023-05-10
Attending: ANESTHESIOLOGY
Payer: MEDICARE

## 2023-05-10 DIAGNOSIS — M79.671 INTRACTABLE RIGHT HEEL PAIN: ICD-10-CM

## 2023-05-10 PROCEDURE — 72080 X-RAY EXAM THORACOLMB 2/> VW: CPT | Performed by: STUDENT IN AN ORGANIZED HEALTH CARE EDUCATION/TRAINING PROGRAM

## 2023-05-13 ENCOUNTER — MYC MEDICAL ADVICE (OUTPATIENT)
Dept: FAMILY MEDICINE | Facility: CLINIC | Age: 63
End: 2023-05-13
Payer: MEDICARE

## 2023-05-13 DIAGNOSIS — I48.0 PAROXYSMAL ATRIAL FIBRILLATION (H): ICD-10-CM

## 2023-05-15 NOTE — TELEPHONE ENCOUNTER
Asked patient to call [facility], ask them to call [facility] to transfer Rx.  Isela Villar RN

## 2023-05-26 ENCOUNTER — MYC MEDICAL ADVICE (OUTPATIENT)
Dept: FAMILY MEDICINE | Facility: CLINIC | Age: 63
End: 2023-05-26
Payer: MEDICARE

## 2023-06-05 ENCOUNTER — MYC MEDICAL ADVICE (OUTPATIENT)
Dept: FAMILY MEDICINE | Facility: CLINIC | Age: 63
End: 2023-06-05

## 2023-06-05 ENCOUNTER — HOSPITAL ENCOUNTER (OUTPATIENT)
Facility: AMBULATORY SURGERY CENTER | Age: 63
End: 2023-06-05
Attending: STUDENT IN AN ORGANIZED HEALTH CARE EDUCATION/TRAINING PROGRAM | Admitting: STUDENT IN AN ORGANIZED HEALTH CARE EDUCATION/TRAINING PROGRAM
Payer: MEDICARE

## 2023-06-05 ENCOUNTER — OFFICE VISIT (OUTPATIENT)
Dept: ORTHOPEDICS | Facility: CLINIC | Age: 63
End: 2023-06-05
Payer: MEDICARE

## 2023-06-05 DIAGNOSIS — G56.22 CUBITAL TUNNEL SYNDROME ON LEFT: ICD-10-CM

## 2023-06-05 DIAGNOSIS — G56.22 CUBITAL TUNNEL SYNDROME ON LEFT: Primary | ICD-10-CM

## 2023-06-05 DIAGNOSIS — G56.02 LEFT CARPAL TUNNEL SYNDROME: ICD-10-CM

## 2023-06-05 PROCEDURE — 99214 OFFICE O/P EST MOD 30 MIN: CPT | Performed by: STUDENT IN AN ORGANIZED HEALTH CARE EDUCATION/TRAINING PROGRAM

## 2023-06-05 NOTE — NURSING NOTE
Teaching Flowsheet   Relevant Diagnosis: Cubital tunnel syndrome on left. Carpal tunnel syndrome on left.  Teaching Topic: Left cubital tunnel release.  Left open carpal tunnel release.    CSC under [facility] with Dr Deny Dixon.    Patient has thumb fracture on left sustained yesterday 6-4-23. He will have surgery in a couple of months so can heal.  Will recheck in clinic in 6 weeks.    Person(s) involved in teaching:   Patient     Motivation Level:  Asks Questions: Yes  Eager to Learn: Yes  Cooperative: Yes  Receptive (willing/able to accept information): Yes  Any cultural factors/Jainism beliefs that may influence understanding or compliance? No    Patient demonstrates understanding of the following:  Reason for the appointment, diagnosis and treatment plan: Yes  Knowledge of proper use of medications and conditions for which they are ordered (with special attention to potential side effects or drug interactions): Yes  Which situations necessitate calling provider and whom to contact: Yes    Teaching Concerns Addressed:   Proper use and care of  (medical equip, care aids, etc.): Yes  Nutritional needs and diet plan: Yes  Pain management techniques: Yes  Wound Care: Yes  How and/when to access community resources: Yes     Instructional Materials Used/Given:   Preoperative surgery packet, antibacterial Chlorhexidine soap. Stop Light Tool reviewed, after-hours number provided, patient verbalized understanding, had no immediate questions. Liz Sánchez RN

## 2023-06-05 NOTE — NURSING NOTE
Reason For Visit:   Chief Complaint   Patient presents with     RECHECK     Left carpal/cubital tunnel follow-up   Left thumb fracture DOI: 6/4/23       Primary MD: Wegener, Joel Daniel Irwin  Ref. MD: GAGE    Age: 62 year old    ?  No      There were no vitals taken for this visit.      Pain Assessment  Patient Currently in Pain: Yes  0-10 Pain Scale: 10  Primary Pain Location: Finger (Comment which one) (Left thumb)    Hand Dominance Evaluation  Hand Dominance: Right          [facility] Assessment       View : No data to display.                   Current Outpatient Medications   Medication Sig Dispense Refill     ACE/ARB NOT PRESCRIBED, INTENTIONAL, ACE & ARB not prescribed due to Allergy       albuterol (PROAIR HFA/PROVENTIL HFA/VENTOLIN HFA) 108 (90 Base) MCG/ACT inhaler INHALE 2 PUFFS BY MOUTH EVERY 6 HOURS AS NEEDED FOR SHORTNESS OF BREATH/DYSPNEA OR WHEEZING 18 g 1     apixaban ANTICOAGULANT (ELIQUIS ANTICOAGULANT) 5 MG tablet Take 1 tablet (5 mg) by mouth 2 times daily 180 tablet 1     baclofen (LIORESAL) 10 MG tablet TAKE 2 TABLETS BY MOUTH THREE TIMES DAILY 180 tablet 3     doxazosin (CARDURA) 8 MG tablet TAKE 1 TABLET (8 MG) BY MOUTH AT BEDTIME 90 tablet 1     gabapentin (NEURONTIN) 300 MG capsule 300 mg at bedtime x 3 days 600 mg at bedtime X 3 days 300 mg afternoon and 600 mg at bedtime 90 capsule 0     GLOBAL EASE INJECT PEN NEEDLES 32G X 4 MM miscellaneous USE AS DIRECTED EVERY  each 1     insulin glargine (LANTUS PEN) 100 UNIT/ML pen Inject 26 Units Subcutaneous At Bedtime 30 mL 3     medical cannabis (Patient's own supply) See Admin Instructions (The purpose of this order is to document that the patient reports taking medical cannabis.  This is not a prescription, and is not used to certify that the patient has a qualifying medical condition.)       oxyCODONE (ROXICODONE) 5 MG tablet Take 1 tablet (5 mg) by mouth 3 times daily 90 tablet 0     promethazine (PHENERGAN) 25 MG tablet  TAKE 1 TABLET(25 MG) BY MOUTH THREE TIMES DAILY AS NEEDED FOR NAUSEA 90 tablet 4     propranolol (INDERAL) 40 MG tablet Take 2 tablets (80 mg) by mouth 2 times daily 360 tablet 3     rosuvastatin (CRESTOR) 20 MG tablet Take 1 tablet (20 mg) by mouth daily 90 tablet 3     sertraline (ZOLOFT) 100 MG tablet TAKE 2 TABLETS BY MOUTH DAILY 180 tablet 3     triamterene-HCTZ (DYAZIDE) 37.5-25 MG capsule Take 2 capsules by mouth every morning 180 capsule 3       Allergies   Allergen Reactions     Levaquin Anaphylaxis and Rash     Swelling in lip and tongue felt thick     Lisinopril Anaphylaxis     Swollen tongue; inability to swallow; drooling; hives; swollen face, neck, angioedema     Acetaminophen      Hx of cirrhosis      Amlodipine      Swelling, hives, possible angioedema       Morphine      b/p dropped and arms went numb  Hypotension     Quinolones Hives     Spironolactone Unknown     Pt believes himself to be allergic to it; cannot find his old records of this.-mjc      Bactrim [Sulfamethoxazole-Trimethoprim] Rash       Darci Hayes
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
## 2023-06-06 ENCOUNTER — MYC MEDICAL ADVICE (OUTPATIENT)
Dept: FAMILY MEDICINE | Facility: CLINIC | Age: 63
End: 2023-06-06
Payer: MEDICARE

## 2023-06-06 ENCOUNTER — TELEPHONE (OUTPATIENT)
Dept: ORTHOPEDICS | Facility: CLINIC | Age: 63
End: 2023-06-06
Payer: MEDICARE

## 2023-06-06 DIAGNOSIS — G89.4 CHRONIC PAIN SYNDROME: ICD-10-CM

## 2023-06-06 DIAGNOSIS — Z98.890 H/O FOOT SURGERY: ICD-10-CM

## 2023-06-06 DIAGNOSIS — M79.671 INTRACTABLE RIGHT HEEL PAIN: ICD-10-CM

## 2023-06-06 DIAGNOSIS — M65.28 CALCIFIC ACHILLES TENDINITIS: ICD-10-CM

## 2023-06-06 DIAGNOSIS — M21.6X1 ACQUIRED CALCANEUS DEFORMITY OF RIGHT ANKLE: ICD-10-CM

## 2023-06-06 DIAGNOSIS — G89.29 CHRONIC PAIN OF RIGHT ANKLE: ICD-10-CM

## 2023-06-06 DIAGNOSIS — S86.011A RUPTURE OF RIGHT ACHILLES TENDON, INITIAL ENCOUNTER: ICD-10-CM

## 2023-06-06 DIAGNOSIS — M25.571 CHRONIC PAIN OF RIGHT ANKLE: ICD-10-CM

## 2023-06-06 NOTE — TELEPHONE ENCOUNTER
Patient has been scheduled for surgery. Details are below.    Date of Surgery: 07/07/2023    Approximate Arrival Time: 1055am, cristin 12:55pm    Surgeon: Dr. Dixon    Procedure: RELEASE, LEFT CUBITAL TUNNEL  Location: [address] [address]    PreOp Physical: 06/29/23  PostOp: 07/20/23   Packet Mailed/MyChart Sent: yes  Added to [place]: yes

## 2023-06-07 RX ORDER — OXYCODONE HYDROCHLORIDE 5 MG/1
5 TABLET ORAL 3 TIMES DAILY
Qty: 90 TABLET | Refills: 0 | Status: SHIPPED | OUTPATIENT
Start: 2023-06-07 | End: 2023-07-06

## 2023-06-07 RX ORDER — OXYCODONE HYDROCHLORIDE 5 MG/1
5 TABLET ORAL 3 TIMES DAILY
Qty: 90 TABLET | Refills: 0 | Status: SHIPPED | OUTPATIENT
Start: 2023-06-07 | End: 2023-06-07

## 2023-06-07 NOTE — TELEPHONE ENCOUNTER
JW,        Please see [employer] message.  Routing refill request to provider for review/approval because:  Drug not on the [facility] refill protocol   Last OV 5/1/23 - preop    Seen in ED 6/4    Thank you,  Isela Villar RN

## 2023-06-07 NOTE — TELEPHONE ENCOUNTER
JW,  Please see below [employer] message and advise.  Patent also called [employer] is out stock  Wants it sent back to [facility]    Thanks,  RODERICK Heath

## 2023-06-13 ENCOUNTER — TELEPHONE (OUTPATIENT)
Dept: GASTROENTEROLOGY | Facility: CLINIC | Age: 63
End: 2023-06-13
Payer: MEDICARE

## 2023-06-20 NOTE — TELEPHONE ENCOUNTER
FUTURE VISIT INFORMATION      SURGERY INFORMATION:    Date: 7/7/23    [facility]    Surgeon:  Deny Dixon MD    Anesthesia Type:  MAC with Block    Procedure: RELEASE, LEFT CUBITAL TUNNEL RELEASE, LEFT CARPAL TUNNEL    Consult: ov 6/5/23    RECORDS REQUESTED FROM:       Primary Care Provider: Wegener, Joel [facility]    Pertinent Medical History: hypertension, paroxysmal atrial fibrillation     Most recent EKG+ Tracing: 10/19/21    Most recent ECHO: 9/7/21

## 2023-06-29 NOTE — PROGRESS NOTES
Preoperative Assessment Center Medication History Note  Medication history completed on June 29, 2023 by this writer prior to patient's PAC appointment. See Epic admission navigator for prior to admission medications. Operating room staff will still need to confirm medications and last dose information on day of surgery.     Medication history interview sources  Patient and CareEverywhere/SureScripts via phone    Changes made to PTA medication list    Added: none    Deleted: none    Changed: oxycodone, dyazide dose/sig (changed to 1 capsule daily about 6 months ago).     Allergies reviewed with patient and updates made in EHR: yes    -- call cut short and unable to do allergies as patient had bad reception. Did complete medications.   -- Reports being on blood thinning medications - see other note.    -- Declines being on any other prescription or over-the-counter medications    Prior to Admission medications    Medication Sig Last Dose Taking? Auth Provider Long Term End Date   albuterol (PROAIR HFA/PROVENTIL HFA/VENTOLIN HFA) 108 (90 Base) MCG/ACT inhaler INHALE 2 PUFFS BY MOUTH EVERY 6 HOURS AS NEEDED FOR SHORTNESS OF BREATH/DYSPNEA OR WHEEZING Taking Yes Wegener, Joel Daniel Irwin, MD Yes    apixaban ANTICOAGULANT (ELIQUIS ANTICOAGULANT) 5 MG tablet Take 1 tablet (5 mg) by mouth 2 times daily Taking Yes Wegener, Joel Daniel Irwin, MD     baclofen (LIORESAL) 10 MG tablet TAKE 2 TABLETS BY MOUTH THREE TIMES DAILY Taking Yes Wegener, Joel Daniel Irwin, MD     doxazosin (CARDURA) 8 MG tablet TAKE 1 TABLET (8 MG) BY MOUTH AT BEDTIME Taking Yes Markus Fox MD Yes    gabapentin (NEURONTIN) 300 MG capsule 300 mg afternoon and 600 mg at bedtime Taking Yes Carlito Grimm MD Yes    insulin glargine (LANTUS PEN) 100 UNIT/ML pen Inject 26 Units Subcutaneous At Bedtime Taking Yes Wegener, Joel Daniel Irwin, MD Yes    medical cannabis (Patient's own supply) See Admin Instructions (The purpose of this order is to document  that the patient reports taking medical cannabis.  This is not a prescription, and is not used to certify that the patient has a qualifying medical condition.)   Flower - PRN Taking Yes Reported, Patient     oxyCODONE (ROXICODONE) 5 MG tablet Take 1 tablet (5 mg) by mouth 3 times daily (visit with Dr. Wegener every three months)  Patient taking differently: Take 5 mg by mouth 3 times daily as needed for pain (visit with Dr. Wegener every three months) Taking Yes Wegener, Joel Daniel Irwin, MD     promethazine (PHENERGAN) 25 MG tablet TAKE 1 TABLET(25 MG) BY MOUTH THREE TIMES DAILY AS NEEDED FOR NAUSEA Taking Yes Wegener, Joel Daniel Irwin, MD No    propranolol (INDERAL) 40 MG tablet Take 2 tablets (80 mg) by mouth 2 times daily Taking Yes Wegener, Joel Daniel Irwin, MD Yes    rosuvastatin (CRESTOR) 20 MG tablet Take 1 tablet (20 mg) by mouth daily Taking Yes Wegener, Joel Daniel Irwin, MD Yes    sertraline (ZOLOFT) 100 MG tablet TAKE 2 TABLETS BY MOUTH DAILY Taking Yes Wegener, Joel Daniel Irwin, MD Yes    triamterene-HCTZ (DYAZIDE) 37.5-25 MG capsule Take 2 capsules by mouth every morning  Patient taking differently: Take 1 capsule by mouth every morning Taking Yes Wegener, Joel Daniel Irwin, MD Yes    ACE/ARB NOT PRESCRIBED, INTENTIONAL, ACE & ARB not prescribed due to Allergy   Ksenia Bean APRN CNP     GLOBAL EASE INJECT PEN NEEDLES 32G X 4 MM miscellaneous USE AS DIRECTED EVERY DAY   Wegener, Joel Daniel Irwin, MD            Medication History Completed By: Mukesh [facility] located [facility] 6/29/2023 1:59 PM

## 2023-06-30 NOTE — PHARMACY - PREOPERATIVE ASSESSMENT CENTER
Anticoagulation Note - Preoperative Assessment Center (PAC) Pharmacist     Patient was interviewed on June 29, 2023 prior to scheduled PAC clinic appointment. The purpose of this note is to document the perioperative anticoagulation plan outlined by the providers caring for Erwin Aguilar.     Current Regimen  Anticoagulation Regimen as of June 29, 2023: apixaban (ELIQUIS) 5 mg by mouth twice daily     Indication: afib (no h/o stroke or stroke,   Prescriber:  Dr. Wegener  Expected Duration of therapy: indefinite   Current medications that may interact with this include: none    Creatinine   Date Value Ref Range Status   05/01/2023 0.78 0.67 - 1.17 mg/dL Final   04/09/2021 0.68 0.66 - 1.25 mg/dL Final       Perioperative plan  Erwin Aguilar is scheduled for cubital tunnel release on 7/7/23 with Dr. Dixon and the perioperative anticoagulation plan outlined by Dr. Dixon is to hold x1 day pre op for this low-bleed risk surgery. Last dose of Eliquis to be on 7/5/23 PM.   Resumption of anticoagulation after procedure will be based on surgery team assessment of bleeding risks and complications.  This plan may require re-assessment and modification by his primary team in the perioperative setting depending on patients clinical situation.        Mukesh Choi RPH  June 29, 2023  1:59 PM    --------------------  RE: eliquis for upcoming OR  Received: 2 days ago  Deny Dixon MD Leinum, Corey J [facility]; Miya Cummings PA-C [place]     That would suffice. It is a low bleed risk procedure.     Thank you!     Ed           Previous Messages       ----- Message -----   From: Mukesh Choi [facility]   Sent: 6/27/2023  11:09 AM CDT   To: Deny Dixon MD; Miya Cummings PA-C   Subject: eliquis for upcoming OR                           Hello Dr. Dixon,   We are seeing Erwin on Monday in PAC clinic in anticipation for upcoming cubital tunnel release on 7/7/23.  He is on Eliquis 5 mg BID for h/o afib.     Typically for low bleed risk surgery  we would hold this x1 day pre op (last dose 7/5/23 PM). Would that suffice for this upcoming procedure?     Thanks in advance,   Mukesh

## 2023-07-02 RX ORDER — FENTANYL CITRATE 50 UG/ML
25 INJECTION, SOLUTION INTRAMUSCULAR; INTRAVENOUS EVERY 5 MIN PRN
Status: CANCELLED | OUTPATIENT
Start: 2023-07-02

## 2023-07-02 RX ORDER — HYDRALAZINE HYDROCHLORIDE 20 MG/ML
2.5-5 INJECTION INTRAMUSCULAR; INTRAVENOUS EVERY 10 MIN PRN
Status: CANCELLED | OUTPATIENT
Start: 2023-07-02

## 2023-07-02 RX ORDER — ONDANSETRON 2 MG/ML
4 INJECTION INTRAMUSCULAR; INTRAVENOUS EVERY 30 MIN PRN
Status: CANCELLED | OUTPATIENT
Start: 2023-07-02

## 2023-07-02 RX ORDER — FENTANYL CITRATE 50 UG/ML
25 INJECTION, SOLUTION INTRAMUSCULAR; INTRAVENOUS
Status: CANCELLED | OUTPATIENT
Start: 2023-07-02

## 2023-07-02 RX ORDER — FENTANYL CITRATE 50 UG/ML
50 INJECTION, SOLUTION INTRAMUSCULAR; INTRAVENOUS EVERY 5 MIN PRN
Status: CANCELLED | OUTPATIENT
Start: 2023-07-02

## 2023-07-02 RX ORDER — ACETAMINOPHEN 325 MG/1
975 TABLET ORAL ONCE
Status: CANCELLED | OUTPATIENT
Start: 2023-07-02 | End: 2023-07-02

## 2023-07-02 RX ORDER — OXYCODONE HYDROCHLORIDE 5 MG/1
5 TABLET ORAL EVERY 4 HOURS PRN
Status: CANCELLED | OUTPATIENT
Start: 2023-07-02

## 2023-07-02 RX ORDER — HYDROMORPHONE HYDROCHLORIDE 1 MG/ML
0.2 INJECTION, SOLUTION INTRAMUSCULAR; INTRAVENOUS; SUBCUTANEOUS EVERY 5 MIN PRN
Status: CANCELLED | OUTPATIENT
Start: 2023-07-02

## 2023-07-02 RX ORDER — SODIUM CHLORIDE, SODIUM LACTATE, POTASSIUM CHLORIDE, CALCIUM CHLORIDE 600; 310; 30; 20 MG/100ML; MG/100ML; MG/100ML; MG/100ML
INJECTION, SOLUTION INTRAVENOUS CONTINUOUS
Status: CANCELLED | OUTPATIENT
Start: 2023-07-02

## 2023-07-02 RX ORDER — ONDANSETRON 4 MG/1
4 TABLET, ORALLY DISINTEGRATING ORAL EVERY 30 MIN PRN
Status: CANCELLED | OUTPATIENT
Start: 2023-07-02

## 2023-07-02 RX ORDER — LIDOCAINE 40 MG/G
CREAM TOPICAL
Status: CANCELLED | OUTPATIENT
Start: 2023-07-02

## 2023-07-02 RX ORDER — METOPROLOL TARTRATE 1 MG/ML
1-2 INJECTION, SOLUTION INTRAVENOUS EVERY 5 MIN PRN
Status: CANCELLED | OUTPATIENT
Start: 2023-07-02

## 2023-07-02 RX ORDER — HYDROMORPHONE HYDROCHLORIDE 1 MG/ML
0.4 INJECTION, SOLUTION INTRAMUSCULAR; INTRAVENOUS; SUBCUTANEOUS EVERY 5 MIN PRN
Status: CANCELLED | OUTPATIENT
Start: 2023-07-02

## 2023-07-02 RX ORDER — OXYCODONE HYDROCHLORIDE 5 MG/1
10 TABLET ORAL EVERY 4 HOURS PRN
Status: CANCELLED | OUTPATIENT
Start: 2023-07-02

## 2023-07-03 ENCOUNTER — OFFICE VISIT (OUTPATIENT)
Dept: SURGERY | Facility: CLINIC | Age: 63
End: 2023-07-03
Payer: MEDICARE

## 2023-07-03 ENCOUNTER — PRE VISIT (OUTPATIENT)
Dept: SURGERY | Facility: CLINIC | Age: 63
End: 2023-07-03

## 2023-07-03 VITALS
WEIGHT: 215 LBS | HEIGHT: 71 IN | HEART RATE: 56 BPM | DIASTOLIC BLOOD PRESSURE: 92 MMHG | SYSTOLIC BLOOD PRESSURE: 167 MMHG | BODY MASS INDEX: 30.1 KG/M2 | TEMPERATURE: 98.4 F | OXYGEN SATURATION: 97 %

## 2023-07-03 DIAGNOSIS — G56.22 CUBITAL TUNNEL SYNDROME ON LEFT: ICD-10-CM

## 2023-07-03 DIAGNOSIS — G56.02 CARPAL TUNNEL SYNDROME OF LEFT WRIST: ICD-10-CM

## 2023-07-03 DIAGNOSIS — Z01.818 PRE-OP EVALUATION: Primary | ICD-10-CM

## 2023-07-03 PROCEDURE — 99215 OFFICE O/P EST HI 40 MIN: CPT | Performed by: PHYSICIAN ASSISTANT

## 2023-07-03 ASSESSMENT — COPD QUESTIONNAIRES: COPD: 0

## 2023-07-03 ASSESSMENT — PAIN SCALES - GENERAL: PAINLEVEL: EXTREME PAIN (9)

## 2023-07-03 ASSESSMENT — ENCOUNTER SYMPTOMS: DYSRHYTHMIAS: 1

## 2023-07-03 ASSESSMENT — LIFESTYLE VARIABLES: TOBACCO_USE: 1

## 2023-07-03 NOTE — ED PROVIDER NOTES
[place] EMERGENCY DEPARTMENT ([facility])  November 15, 2020  ED 23 10:44 AM   History     Chief Complaint   Patient presents with     Laceration     The history is provided by the patient and medical records.     Erwin Aguilar is a 60 year old male who presents with laceration to right knee. Patient has a history of recent orthopedic surgery. His right lower extremity remains in a hard cast at this time and is non-weightbearing, mobilizing on a scooter. Today he was trying to clear his room as he is getting a new bed and was resting his right lower extremity on his scooter. The scooter rolled and he fell forward, bashing his right knee over the edge of an empty box and tearing the skin in the process. He has a large v shaped wound on his right knee, bleeding is controlled. He denies any pain in the knee joint, doesn't think the knee joint is affected at all. No pain in distal leg. His only concern is the large skin tear on his right knee.     Per [facility] records, last Tdap was in 2019.     PAST MEDICAL HISTORY:   Past Medical History:   Diagnosis Date     Actinic keratosis     aldara     Anxiety      Cancer (H)     squamous cell skin CA     Cauda equina spinal cord injury (H)      Chronic sinusitis 5-1-16     Diastasis recti      Esophageal reflux      Esophageal varices in cirrhosis (H) 4/1/2014    Hospitalized for UGI blee 3/28/14, endoscopy revealed bleeding varices.     Essential hypertension, benign      Intermittent asthma      Mild depression (H)      Mixed hyperlipidemia      Nasal polyps 5-1-16     Other chronic pain      SCCA (squamous cell carcinoma) of skin      Seasonal allergic conjunctivitis      Type II or unspecified type diabetes mellitus without mention of complication, not stated as uncontrolled      Unspecified site of spinal cord injury without evidence of spinal bone injury     due to back surgery       PAST SURGICAL HISTORY:   Past Surgical History:   Procedure Laterality  Pt states that he finished his abx for the UTI. He wanting to know if he needs to come back in and get tested again. States that he isn't having any symptoms, but he didn't have any the first time.            Date     BACK SURGERY  August 2009     C APPENDECTOMY  1974     COLONOSCOPY N/A 5/12/2016    Procedure: COMBINED COLONOSCOPY, SINGLE OR MULTIPLE BIOPSY/POLYPECTOMY BY BIOPSY;  Surgeon: Ana Paula Urbina MD;  Location:  GI     ENDOSCOPY UPPER, COLONOSCOPY, COMBINED  10/19/2011    Procedure:COMBINED ENDOSCOPY UPPER, COLONOSCOPY; Upper Endoscopy, Colonoscopy with Polypectomy x4; Surgeon:AMBAR RODRÍGUEZ; Location:[facility] OR     ENT SURGERY  1-2016    Ongoing since then     ESOPHAGOSCOPY, GASTROSCOPY, DUODENOSCOPY (EGD), COMBINED  3/28/2014    Procedure: COMBINED ESOPHAGOSCOPY, GASTROSCOPY, DUODENOSCOPY (EGD);  EGD, Gastric Biopsies, Esophageal Banding;  Surgeon: Reyna Tovar MD;  [facility]: [facility] OR     ESOPHAGOSCOPY, GASTROSCOPY, DUODENOSCOPY (EGD), COMBINED  6/9/2014    Procedure: COMBINED ESOPHAGOSCOPY, GASTROSCOPY, DUODENOSCOPY (EGD);  Surgeon: Curtis Mendez MD;  Location:  GI     ESOPHAGOSCOPY, GASTROSCOPY, DUODENOSCOPY (EGD), COMBINED  7/24/2014    Procedure: COMBINED ESOPHAGOSCOPY, GASTROSCOPY, DUODENOSCOPY (EGD);  Surgeon: Gerard Samaniego MD;  Location:  OR     ESOPHAGOSCOPY, GASTROSCOPY, DUODENOSCOPY (EGD), COMBINED N/A 10/31/2014    Procedure: COMBINED ESOPHAGOSCOPY, GASTROSCOPY, DUODENOSCOPY (EGD);  Surgeon: Gerard Samaniego MD;  [facility]: [facility] OR     ESOPHAGOSCOPY, GASTROSCOPY, DUODENOSCOPY (EGD), COMBINED N/A 5/12/2016    Procedure: COMBINED ESOPHAGOSCOPY, GASTROSCOPY, DUODENOSCOPY (EGD);  Surgeon: Ana Paula Urbina MD;  [facility]     ESOPHAGOSCOPY, GASTROSCOPY, DUODENOSCOPY (EGD), COMBINED N/A 8/2/2018    Procedure: COMBINED ESOPHAGOSCOPY, GASTROSCOPY, DUODENOSCOPY (EGD);  EGD;  Surgeon: Yu Wagner MD;  Location:  GI     HCL SQUAMOUS CELL CARCINOMA AG  10/07    left forearm     HERNIORRHAPHY UMBILICAL  11/8/2012    Procedure: HERNIORRHAPHY UMBILICAL;  Open Umbilical Hernia Repair With Mesh ;  Surgeon: Chase Nicholson MD;  Location:  [facility] OR     INSERT STIMULATOR DORSAL COLUMN N/A 2018    Procedure: INSERT STIMULATOR DORSAL COLUMN;;  Surgeon: Elvis Sauceda MD;  Location:  OR     neuro stimulator       REMOVE GENERATOR STIMULATOR (LOCATION) N/A 2018    Procedure: REMOVE GENERATOR STIMULATOR (LOCATION);  Spinal Cord Stimulator and IPG Explant and Re-Implant of SCS System (Leads and IPG);  Surgeon: Elvis Sauceda MD;  Location:  OR     REPAIR TENDON ACHILLES Right 2020    Procedure: Right achilles debridement and partial calcaneus excision;  Surgeon: Eh Sandoval MD;  Location: [facility] OR     SURGICAL HISTORY OF -       abcess tooth     SURGICAL HISTORY OF -       L4-5 laminectomy, cauda equina syndrome     SURGICAL HISTORY OF -   +    herniated disk repair     TONSILLECTOMY  10 1964     TRANSPOSITION ULNAR NERVE (ELBOW)      right       Past medical history, past surgical history, medications, and allergies were reviewed with the patient. Additional pertinent items: None    FAMILY HISTORY:   Family History   Problem Relation Age of Onset     Cancer Mother         lung     Breast Cancer Mother      Other Cancer Mother      Cancer Father         esophogeal, GERD     Diabetes Brother         amputation, Type 1     Diabetes Brother      Diabetes Brother      Cancer - colorectal No family hx of        SOCIAL HISTORY:   Social History     Tobacco Use     Smoking status: Former Smoker     Packs/day: 0.50     Years: 1.00     Pack years: 0.50     Types: Cigarettes     Start date: 10/13/1983     Quit date: 1984     Years since quittin.4     Smokeless tobacco: Never Used   Substance Use Topics     Alcohol use: No     Alcohol/week: 0.0 standard drinks     Comment: a pint of alcohol / day - last drink was 3/28/14     Social history was reviewed with the patient. Additional pertinent items: None      Discharge Medication List as of 11/15/2020  1:12 PM      CONTINUE these  medications which have NOT CHANGED    Details   ACE/ARB NOT PRESCRIBED, INTENTIONAL, ACE & ARB not prescribed due to Allergy, Historical      albuterol (PROAIR HFA/PROVENTIL HFA/VENTOLIN HFA) 108 (90 Base) MCG/ACT inhaler INHALE 2 PUFFS BY MOUTH EVERY 6 HOURS AS NEEDED FOR SHORTNESS OF BREATH/DYSPNEA OR WHEEZING, Disp-3 Inhaler,R-3, E-Prescribe      ammonium lactate (LAC-HYDRIN) 12 % cream Apply topically 2 times daily as needed for dry skinDisp-385 g, [id number]      baclofen (LIORESAL) 10 MG tablet TAKE 2 TABLETS BY MOUTH THREE TIMES DAILY, Disp-180 tablet,R-11, E-Prescribe      blood glucose (ACCU-CHEK KARISHMA PLUS) test strip USE TO TEST BLOOD SUGARS ONCE DAILY, Disp-100 strip, R-1, E-Prescribe      hydrochlorothiazide (HYDRODIURIL) 25 MG tablet TAKE 1 TABLET (25 MG) BY MOUTH DAILY, Disp-90 tablet,R-3, E-Prescribe      insulin glargine (BASAGLAR KWIKPEN) 100 UNIT/ML pen Inject 26 Units Subcutaneous daily, Disp-23.4 mL,R-3, E-PrescribeIf Basaglar is not covered by insurance, may substitute Lantus at same dose and frequency.        insulin pen needle (BD ROSE U/F) 32G X 4 MM miscellaneous Use daily and as directed.Disp-100 each, [id number]      morphine (MS CONTIN) 15 MG CR tablet Take 1 tablet (15 mg) by mouth every 12 hours for 5 days maximum 2 tablet(s) per day, Disp-10 tablet, R-0, E-Prescribe      !! oxyCODONE (ROXICODONE) 5 MG tablet Take 1-2 tablets (5-10 mg) by mouth every 4 hours as needed for moderate to severe pain, Disp-26 tablet, R-0, E-PrescribeInsurance limits as they apply      !! oxyCODONE (ROXICODONE) 5 MG tablet Take 1 tablet (5 mg) by mouth 2 times daily (please start prior auth process if unable to fill then fill the 7 day prescription. ), Disp-60 tablet, R-0, E-Prescribe      potassium chloride ER (K-TAB) 20 MEQ CR tablet TAKE 1 TABLET (20 MEQ) BY MOUTH DAILY, Disp-90 tablet,R-3, E-Prescribe      promethazine (PHENERGAN) 25 MG tablet TAKE 1 TABLET (25 MG) BY MOUTH 3 TIMES DAILY AS  NEEDED FOR NAUSEA, Disp-90 tablet, R-1, E-Prescribe      propranolol (INDERAL) 40 MG tablet TAKE 2 TABLETS (80 MG) BY MOUTH 2 TIMES DAILY, Disp-360 tablet,R-3, E-Prescribe      !! rosuvastatin (CRESTOR) 10 MG tablet TAKE 1 TABLET BY MOUTH EVERY DAY, Disp-90 tablet,R-3, E-Prescribe      !! rosuvastatin (CRESTOR) 20 MG tablet TAKE 1 TABLET BY MOUTH EVERY DAY, Disp-90 tablet,R-1, E-Prescribe      sertraline (ZOLOFT) 100 MG tablet TAKE 2 TABLETS BY MOUTH EVERY DAY, Disp-180 tablet, R-3, E-Prescribe       !! - Potential duplicate medications found. Please discuss with provider.             Allergies   Allergen Reactions     Levaquin Anaphylaxis and Rash     Swelling in lip and tongue felt thick     Lisinopril Anaphylaxis     Swollen tongue; inability to swallow; drooling; hives; swollen face, neck, angioedema     Acetaminophen      Hx of cirrhosis      Amlodipine      Swelling, hives, possible angioedema       Morphine      b/p dropped and arms went numb  Hypotension     Quinolones Hives     Bactrim [Sulfamethoxazole W/Trimethoprim] Rash        Review of Systems   Constitutional: Negative for fever.   HENT: Negative for congestion.    Eyes: Negative for redness.   Respiratory: Negative for shortness of breath.    Cardiovascular: Negative for chest pain.   Gastrointestinal: Negative for abdominal pain.   Genitourinary: Negative for difficulty urinating.   Musculoskeletal: Negative for arthralgias and neck stiffness.   Skin: Negative for color change. Wound: right knee.        Cut to right knee   Neurological: Negative for headaches.   Psychiatric/Behavioral: Negative for confusion.     A complete review of systems was performed with pertinent positives and negatives noted in the HPI, and all other systems negative.    Physical Exam   BP: (!) 140/55  Pulse: 58  Temp: 98.1  F (36.7  C)  Resp: 18  Weight: 97.5 kg (215 lb)  SpO2: 97 %      Physical Exam  HENT:      Head: Atraumatic.   Eyes:      Pupils: Pupils are equal,  round, and reactive to light.   Cardiovascular:      Rate and Rhythm: Regular rhythm.      Heart sounds: Normal heart sounds.   Pulmonary:      Effort: No respiratory distress.      Breath sounds: Normal breath sounds.   Chest:      Chest wall: No tenderness.   Abdominal:      Tenderness: There is no abdominal tenderness.   Musculoskeletal:      Right knee: He exhibits laceration. He exhibits normal range of motion, no swelling, no effusion, no ecchymosis, no deformity, no erythema, normal alignment, no LCL laxity, normal patellar mobility, no bony tenderness, normal meniscus and no MCL laxity. No tenderness found.      Cervical back: He exhibits no tenderness.      Thoracic back: He exhibits no tenderness.      Lumbar back: He exhibits no tenderness.        Legs:    Neurological:      Mental Status: He is oriented to person, place, and time.         ED [facility]     -Laceration Repair    Date/Time: 11/15/2020 2:18 PM  Performed by: Gonzalez Alvares MD  Authorized by: Gonzalez Alvares MD       ANESTHESIA (see MAR for exact dosages):     Anesthesia method:  Local infiltration    Local anesthetic:  Lidocaine 1% WITH epi  LACERATION DETAILS     Location:  Leg    Leg location:  R knee    Length (cm):  5    REPAIR TYPE:     Repair type:  Simple      EXPLORATION:     Wound exploration: wound explored through full range of motion and entire depth of wound probed and visualized      Contaminated: no      TREATMENT:     Area cleansed with:  Walker    Amount of cleaning:  Standard    Irrigation solution:  Sterile saline    Irrigation method:  Pressure wash    Visualized foreign bodies/material removed: no      SKIN REPAIR     Repair method:  Sutures    Suture size:  4-0    Suture material:  Nylon    Suture technique:  Simple interrupted    Number of sutures:  8    APPROXIMATION     Approximation:  Close    POST-PROCEDURE DETAILS     Dressing:  Antibiotic ointment  and bulky dressing      PROCEDURE   Patient Tolerance:  Patient tolerated the procedure well with no immediate complications                     No results found. However, due to the size of the patient record, not all encounters were searched. Please check Results Review for a complete set of results.  Medications   lidocaine 1% with EPINEPHrine 1:100,000 injection 30 mL (has no administration in time range)             Assessments & Plan (with Medical Decision Making)    Right knee laceration, s/p irrigation and repair with ethilon 4-0 #8, flap is damaged-possibly not viable but approximated and hopefully do ok, will discharge and follow up with SR in 14 days.         I have reviewed the nursing notes.    I have reviewed the findings, diagnosis, plan and need for follow up with the patient.    Discharge Medication List as of 11/15/2020  1:12 PM          Final diagnoses:   Knee laceration, right, initial encounter     I, Gema Alberto, am serving as a trained medical scribe to document services personally performed by Gonzalez Alvares MD based on the provider's statements to me on November 15, 2020.  This document has been checked and approved by the attending provider.    IGonzalez MD, was physically present and have reviewed and verified the accuracy of this note documented by Gema Alberto medical scribe.     11/15/2020   [facility] EMERGENCY DEPARTMENT     Gonzalez Alvares MD  11/15/20 [id number]

## 2023-07-03 NOTE — PATIENT INSTRUCTIONS
Preparing for Your Surgery      Name:  Erwin Aguilar   MRN:  [id number]   :  1960   Today's Date:  7/3/2023         Arriving for surgery:  Surgery date:  23  Arrival time:  10.20AM    Restrictions due to COVID 19:    Please maintain social distance.  Masking is optional.      parking is available for anyone with mobility limitations or disabilities. (Monday- Friday 7 am- 5 pm)    Please come to:    [facility]  [address] [place] [id number]    Please check in on the 5th floor at the [facility].      What can I eat or drink?    -  You may eat and drink normally until 8 hours prior to arrival  time. (Until 2.20AM)  -  You may have clear liquids until 2 hours prior to arrival  time. (Until 8.20AM)    Examples of clear liquids:  Water  Clear broth  Juices (apple, white grape, white cranberry  and cider) without pulp  Noncarbonated, powder based beverages  (lemonade and Ronnell-Aid)  Sodas (Sprite, 7-Up, ginger ale and seltzer)  Coffee or tea (without milk or cream)  Gatorade      Which medicines can I take?    Hold Aspirin for 7 days before surgery.   Hold Multivitamins for 7 days before surgery.  Hold Supplements for 7 days before surgery.  Hold Ibuprofen (Advil, Motrin) for 1 day before surgery--unless otherwise directed by surgeon.  Hold Naproxen (Aleve) for 4 days before surgery.  Take your evening/bedtime medications per usual    Hold Eliquis for one day before surgery. Last dose is on 23 PM per pharmacist.  Take only 20 units of Lantus at bedtime.    No alcohol or cannabis products for 24 hours prior to procedure.      -  DO NOT take the following medications the day of surgery:  Triamterene-hydrochlorothiazide (Dyazide).    -  PLEASE TAKE the following medications the day of surgery:   Albuterol (as needed and you can bring them with you to the hospital or surgery center),  Baclofen (as needed), Oxycodone (as needed), Promethazine  (Phenergan as needed), Propananol (Inderal),  Rosuvastatin (Crestor), Sertraline (Zoloft),     How do I prepare myself?  - Please take 2 showers (one the night prior to surgery and one the morning of surgery) using Scrubcare or Hibiclens soap.    Use this soap only from the neck to your toes.     Leave the soap on your skin for one minute--then rinse thoroughly.      You may use your own shampoo and conditioner. No other hair products.   - Please remove all jewelry and body piercings.  - No lotions, deodorants or fragrance.  - No makeup or fingernail polish.   - Bring your ID and insurance card.    -If you have a Deep Brain Stimulator, a Spinal Cord Stimulator, or any implanted Neuro Device, you must bring the remote to the Surgery Center.         ALL PATIENTS ARE REQUIRED TO HAVE A RESPONSIBLE ADULT TO DRIVE AND BE IN ATTENDANCE WITH THEM FOR 24 HOURS FOLLOWING SURGERY.     Covid testing policy as of 12/06/2022  Your surgeon will notify and schedule you for a COVID test if one is needed before surgery--please direct any questions or COVID symptoms to your surgeon      Questions or Concerns:    -For questions regarding the day of surgery, please contact the Ambulatory Surgery Center at [phone].    -If you have health changes between today and your surgery, please contact your surgeon.     - For questions after surgery, please contact your surgeon's office.

## 2023-07-05 ENCOUNTER — TELEPHONE (OUTPATIENT)
Dept: ORTHOPEDICS | Facility: CLINIC | Age: 63
End: 2023-07-05
Payer: MEDICARE

## 2023-07-05 NOTE — TELEPHONE ENCOUNTER
[facility] Call Center    Phone Message    May a detailed message be left on voicemail: no     Reason for Call: Other: Requesting c/b- need to know about what time surgery will be done    Action Taken: Message routed to:  [facility] ([facility]    Travel Screening: Not Applicable

## 2023-07-05 NOTE — TELEPHONE ENCOUNTER
Spoke with patient.  Right now he is set for 12 noon on 6-7-23, arrival of 10:30 AM, but the nurses from the OR will be calling within next 1-2 days with confirmation of correct time.  They will be the ones that have the final say, in case they bump his time for another case.  Patient verbalized understanding. Liz Sánchez, RN

## 2023-07-06 ENCOUNTER — MYC MEDICAL ADVICE (OUTPATIENT)
Dept: FAMILY MEDICINE | Facility: CLINIC | Age: 63
End: 2023-07-06
Payer: MEDICARE

## 2023-07-06 ENCOUNTER — TELEPHONE (OUTPATIENT)
Dept: ORTHOPEDICS | Facility: CLINIC | Age: 63
End: 2023-07-06
Payer: MEDICARE

## 2023-07-06 DIAGNOSIS — M65.28 CALCIFIC ACHILLES TENDINITIS: ICD-10-CM

## 2023-07-06 DIAGNOSIS — S86.011A RUPTURE OF RIGHT ACHILLES TENDON, INITIAL ENCOUNTER: ICD-10-CM

## 2023-07-06 DIAGNOSIS — M21.6X1 ACQUIRED CALCANEUS DEFORMITY OF RIGHT ANKLE: ICD-10-CM

## 2023-07-06 DIAGNOSIS — M79.671 INTRACTABLE RIGHT HEEL PAIN: ICD-10-CM

## 2023-07-06 DIAGNOSIS — Z98.890 H/O FOOT SURGERY: ICD-10-CM

## 2023-07-06 DIAGNOSIS — G89.29 CHRONIC PAIN OF RIGHT ANKLE: ICD-10-CM

## 2023-07-06 DIAGNOSIS — M25.571 CHRONIC PAIN OF RIGHT ANKLE: ICD-10-CM

## 2023-07-06 DIAGNOSIS — G89.4 CHRONIC PAIN SYNDROME: ICD-10-CM

## 2023-07-06 RX ORDER — OXYCODONE HYDROCHLORIDE 5 MG/1
5 TABLET ORAL 3 TIMES DAILY
Qty: 90 TABLET | Refills: 0 | Status: SHIPPED | OUTPATIENT
Start: 2023-07-06 | End: 2023-08-14

## 2023-07-06 RX ORDER — FENTANYL CITRATE 50 UG/ML
25-100 INJECTION, SOLUTION INTRAMUSCULAR; INTRAVENOUS
Status: CANCELLED | OUTPATIENT
Start: 2023-07-06

## 2023-07-06 NOTE — TELEPHONE ENCOUNTER
Dr Dixon informed.  He will try to speak with OR tomorrow morning and then see if his  can reach out to patient in the morning, but no guarantees for time.      This information was left on a voice mail for patient just now by RN.     Also added that If this was the first time that the OR called with his time then this was the intended time for his surgery and they did not change anything at all.      If they had already given him a locked-in surgery time and they changed it afterwards then I offered our apology for that, it is unusual for that to happen.     Message forwarded to  and Dr Dixon as HECTOR. Liz Sánchez RN

## 2023-07-06 NOTE — TELEPHONE ENCOUNTER
Routing refill request to provider for review/approval because:  Drug not on the [facility] refill protocol     Romelia KLEIN RN

## 2023-07-06 NOTE — TELEPHONE ENCOUNTER
4:50 pm Liz VAUGHN took a call from Heidi NDIAYE in the Ortho in-basket.  Patient just called to say he got the call from the OR nurses with his arrival time and that will not work.  He had pre-arranged rides.  He is scheduled for left cubital tunnel at 1:20 pm tomorrow.  RN left message for Dr Dixon via text as HECTOR and also his surgery scheduler to inform them that patient is calling. Liz Sánchez RN

## 2023-07-07 ENCOUNTER — HOSPITAL ENCOUNTER (OUTPATIENT)
Facility: AMBULATORY SURGERY CENTER | Age: 63
Discharge: HOME | End: 2023-07-07
Attending: STUDENT IN AN ORGANIZED HEALTH CARE EDUCATION/TRAINING PROGRAM | Admitting: STUDENT IN AN ORGANIZED HEALTH CARE EDUCATION/TRAINING PROGRAM
Payer: MEDICARE

## 2023-07-07 DIAGNOSIS — G56.02 LEFT CARPAL TUNNEL SYNDROME: ICD-10-CM

## 2023-07-07 DIAGNOSIS — G56.22 CUBITAL TUNNEL SYNDROME ON LEFT: Primary | ICD-10-CM

## 2023-07-11 ENCOUNTER — MYC MEDICAL ADVICE (OUTPATIENT)
Dept: FAMILY MEDICINE | Facility: CLINIC | Age: 63
End: 2023-07-11
Payer: MEDICARE

## 2023-07-11 DIAGNOSIS — L03.116 CELLULITIS OF LEFT LOWER EXTREMITY: Primary | ICD-10-CM

## 2023-07-12 RX ORDER — CEPHALEXIN 500 MG/1
500 CAPSULE ORAL 3 TIMES DAILY
Qty: 30 CAPSULE | Refills: 0 | Status: SHIPPED | OUTPATIENT
Start: 2023-07-12 | End: 2023-07-13

## 2023-07-12 NOTE — TELEPHONE ENCOUNTER
JW,   Please see [employer] message below w/ attached photo.   Do you want a visit?  Thanks!  Livia CROCKER

## 2023-07-13 DIAGNOSIS — G56.22 CUBITAL TUNNEL SYNDROME ON LEFT: Primary | ICD-10-CM

## 2023-07-13 DIAGNOSIS — G56.02 CARPAL TUNNEL SYNDROME OF LEFT WRIST: ICD-10-CM

## 2023-07-13 RX ORDER — CEPHALEXIN 500 MG/1
500 CAPSULE ORAL 3 TIMES DAILY
Qty: 30 CAPSULE | Refills: 0 | Status: SHIPPED | OUTPATIENT
Start: 2023-07-13 | End: 2023-08-09

## 2023-07-24 PROBLEM — G45.9 TRANSIENT ISCHEMIC ATTACK (TIA): Status: ACTIVE | Noted: 2021-09-07

## 2023-07-24 PROBLEM — Z20.822 CONTACT WITH AND (SUSPECTED) EXPOSURE TO COVID-19: Status: ACTIVE | Noted: 2021-01-25

## 2023-07-27 ENCOUNTER — MYC MEDICAL ADVICE (OUTPATIENT)
Dept: FAMILY MEDICINE | Facility: CLINIC | Age: 63
End: 2023-07-27
Payer: MEDICARE

## 2023-07-27 DIAGNOSIS — I48.0 PAROXYSMAL ATRIAL FIBRILLATION (H): ICD-10-CM

## 2023-07-31 DIAGNOSIS — M54.17 LUMBOSACRAL RADICULOPATHY AT S1: ICD-10-CM

## 2023-08-01 RX ORDER — GABAPENTIN 300 MG/1
CAPSULE ORAL
Qty: 90 CAPSULE | Refills: 1 | Status: SHIPPED | OUTPATIENT
Start: 2023-08-01 | End: 2023-10-30

## 2023-08-01 NOTE — TELEPHONE ENCOUNTER
FUTURE VISIT INFORMATION      SURGERY INFORMATION:  Date: 8/31/2023   Location[place]  [facility]   Surgeon:  Deny Dixon MD   Anesthesia Type:  MAC with Block   Procedure: LEFT CUBITAL TUNNEL RELEASE,   Consult: 6/5/23     RECORDS REQUESTED FROM:       Primary Care Provider: Wegener, Joel Daniel Irwin, MD- [facility]     Pertinent Medical History: hypertension, paroxysmal atrial fibrillation      Most recent EKG+ Tracing: 10/19/21 - [facility]      Most recent ECHO: 9/7/21 - [facility]

## 2023-08-01 NOTE — TELEPHONE ENCOUNTER
Refill request    Medication: gabapentin (NEURONTIN) 300 MG capsule     Si mg afternoon and 600 mg at bedtime     Dispensed: 90  Refills:1  Last prescribed on 23    Last clinic appointment: 23  Next clinic appointment: Not scheduled.       Preferred pharmacy:    [id number] - [place], [place] - [address]     Medication pended and routed to the provider.     Kathleen Ryan LPN

## 2023-08-03 DIAGNOSIS — K70.30 ALCOHOLIC CIRRHOSIS OF LIVER WITHOUT ASCITES (H): Primary | ICD-10-CM

## 2023-08-09 ENCOUNTER — MYC MEDICAL ADVICE (OUTPATIENT)
Dept: FAMILY MEDICINE | Facility: CLINIC | Age: 63
End: 2023-08-09
Payer: MEDICARE

## 2023-08-09 NOTE — PHARMACY - PREOPERATIVE ASSESSMENT CENTER
Anticoagulation Note - Preoperative Assessment Center (PAC) Pharmacist     Patient was interviewed on August 9, 2023 prior to scheduled PAC clinic appointment. The purpose of this note is to document the perioperative anticoagulation plan outlined by the providers caring for Erwin Aguilar.     Current Regimen  Anticoagulation Regimen as of August 9, 2023: apixaban (ELIQUIS) 5 mg by mouth twice daily     Indication: afib (no h/o stroke or DVT)  Prescriber:  Dr. Wegener  Expected Duration of therapy: indefinite  Current medications that may interact with this include: none    Creatinine   Date Value Ref Range Status   05/01/2023 0.78 0.67 - 1.17 mg/dL Final   04/09/2021 0.68 0.66 - 1.25 mg/dL Final       Perioperative plan  Erwin Aguilar is scheduled for L cubital tunnel release and L open carpel tunnel release on 8/31/23 with Dr. Dixon and the perioperative anticoagulation plan outlined by Dr. Dixon is hold x1 day pre op.  Last dose of Eliquis pre op to be on 8/29/23 PM.  Note this is a rescheduled case due to transportation issues (see notes from 7/3/23).      Resumption of anticoagulation after procedure will be based on surgery team assessment of bleeding risks and complications.  This plan may require re-assessment and modification by his primary team in the perioperative setting depending on patients clinical situation.        Mukesh Choi RPH  August 9, 2023  11:15 AM

## 2023-08-09 NOTE — PROGRESS NOTES
[facility] Medication History Note  Medication history completed on August 9, 2023 by this writer prior to patient's PAC appointment. See Epic admission navigator for prior to admission medications. Operating room staff will still need to confirm medications and last dose information on day of surgery.     Medication history interview sources  Patient and CareEverywhere/SureScripts via phone    Changes made to PTA medication list  Added: none  Deleted: cephalexin completed.  Changed: gabapentin dose/sig, oxycodone doses/sig,     Allergies reviewed with patient and updates made in EHR: yes    -- pt had cephalexin for LE cellulitis w/in past month (completed course, improved per pt).   -- No recent (within 30 days) course of systemic steroids  -- Reports being on blood thinning medications -  Eliquis - see other note.   -- Declines being on any other prescription or over-the-counter medications    Prior to Admission medications    Medication Sig Last Dose Taking? Auth Provider Long Term End Date   albuterol (PROAIR HFA/PROVENTIL HFA/VENTOLIN HFA) 108 (90 Base) MCG/ACT inhaler INHALE 2 PUFFS BY MOUTH EVERY 6 HOURS AS NEEDED FOR SHORTNESS OF BREATH/DYSPNEA OR WHEEZING Taking Yes Wegener, Joel Daniel Irwin, MD Yes    apixaban ANTICOAGULANT (ELIQUIS ANTICOAGULANT) 5 MG tablet Take 1 tablet (5 mg) by mouth 2 times daily Taking Yes Wegener, Joel Daniel Irwin, MD     baclofen (LIORESAL) 10 MG tablet TAKE 2 TABLETS BY MOUTH THREE TIMES DAILY Taking Yes Wegener, Joel Daniel Irwin, MD     doxazosin (CARDURA) 8 MG tablet TAKE 1 TABLET (8 MG) BY MOUTH AT BEDTIME Taking Yes Markus Fox MD Yes    gabapentin (NEURONTIN) 300 MG capsule TAKE 1 CAPSULE BY MOUTH IN THE AFTERNOON AND 2 CAPSULES AT BEDTIME  Patient taking differently: TAKE 2 CAPSULE BY MOUTH IN THE MORNING AND 1 CAPSULES AT BEDTIME Taking Yes Carlito Grimm MD Yes    insulin glargine (LANTUS PEN) 100 UNIT/ML pen Inject 26 Units Subcutaneous At  Bedtime Taking Yes Wegener, Joel Daniel Irwin, MD Yes    medical cannabis (Patient's own supply) See Admin Instructions (The purpose of this order is to document that the patient reports taking medical cannabis.  This is not a prescription, and is not used to certify that the patient has a qualifying medical condition.)   Flower - PRN Taking Yes Reported, Patient     oxyCODONE (ROXICODONE) 5 MG tablet Take 1 tablet (5 mg) by mouth 3 times daily (visit with Dr. Wegener every three months)  Patient taking differently: Take 5 mg by mouth 3 times daily as needed for pain (visit with Dr. Wegener every three months) Taking Yes Wegener, Joel Daniel Irwin, MD     ACE/ARB NOT PRESCRIBED, INTENTIONAL, ACE & ARB not prescribed due to Allergy   Ksenia Bean APRN CNP     cephALEXin (KEFLEX) 500 MG capsule Take 1 capsule (500 mg) by mouth 3 times daily   Wegener, Joel Daniel Irwin, MD     GLOBAL EASE INJECT PEN NEEDLES 32G X 4 MM miscellaneous USE AS DIRECTED EVERY DAY   Wegener, Joel Daniel Irwin, MD     promethazine (PHENERGAN) 25 MG tablet TAKE 1 TABLET(25 MG) BY MOUTH THREE TIMES DAILY AS NEEDED FOR NAUSEA   Wegener, Joel Daniel Irwin, MD No    propranolol (INDERAL) 40 MG tablet Take 2 tablets (80 mg) by mouth 2 times daily   Wegener, Joel Daniel Irwin, MD Yes    rosuvastatin (CRESTOR) 20 MG tablet Take 1 tablet (20 mg) by mouth daily   Wegener, Joel Daniel Irwin, MD Yes    sertraline (ZOLOFT) 100 MG tablet TAKE 2 TABLETS BY MOUTH DAILY   Wegener, Joel Daniel Irwin, MD Yes    triamterene-HCTZ (DYAZIDE) 37.5-25 MG capsule Take 2 capsules by mouth every morning  Patient taking differently: Take 1 capsule by mouth every morning   Wegener, Joel Daniel Irwin, MD Yes         Medication History Completed By: Mukesh Choi [facility] 8/9/2023 11:15 AM

## 2023-08-10 ENCOUNTER — LAB (OUTPATIENT)
Dept: LAB | Facility: CLINIC | Age: 63
End: 2023-08-10
Attending: INTERNAL MEDICINE
Payer: MEDICARE

## 2023-08-10 ENCOUNTER — ANESTHESIA EVENT (OUTPATIENT)
Dept: SURGERY | Facility: AMBULATORY SURGERY CENTER | Age: 63
End: 2023-08-10
Payer: MEDICARE

## 2023-08-10 ENCOUNTER — PRE VISIT (OUTPATIENT)
Dept: SURGERY | Facility: CLINIC | Age: 63
End: 2023-08-10

## 2023-08-10 ENCOUNTER — OFFICE VISIT (OUTPATIENT)
Dept: SURGERY | Facility: CLINIC | Age: 63
End: 2023-08-10
Payer: MEDICARE

## 2023-08-10 ENCOUNTER — OFFICE VISIT (OUTPATIENT)
Dept: GASTROENTEROLOGY | Facility: CLINIC | Age: 63
End: 2023-08-10
Attending: INTERNAL MEDICINE
Payer: MEDICARE

## 2023-08-10 VITALS
OXYGEN SATURATION: 100 % | DIASTOLIC BLOOD PRESSURE: 96 MMHG | HEART RATE: 78 BPM | SYSTOLIC BLOOD PRESSURE: 199 MMHG | RESPIRATION RATE: 16 BRPM | BODY MASS INDEX: 30.68 KG/M2 | HEIGHT: 71 IN | TEMPERATURE: 98.7 F | WEIGHT: 219.14 LBS

## 2023-08-10 VITALS
SYSTOLIC BLOOD PRESSURE: 177 MMHG | WEIGHT: 219.2 LBS | DIASTOLIC BLOOD PRESSURE: 77 MMHG | TEMPERATURE: 97.6 F | HEART RATE: 64 BPM | OXYGEN SATURATION: 98 % | BODY MASS INDEX: 30.57 KG/M2

## 2023-08-10 DIAGNOSIS — Z79.4 TYPE 2 DIABETES MELLITUS WITH DIABETIC POLYNEUROPATHY, WITH LONG-TERM CURRENT USE OF INSULIN (H): ICD-10-CM

## 2023-08-10 DIAGNOSIS — Z01.818 PRE-OP EVALUATION: Primary | ICD-10-CM

## 2023-08-10 DIAGNOSIS — Z01.818 PRE-OP EVALUATION: ICD-10-CM

## 2023-08-10 DIAGNOSIS — G56.22 CUBITAL TUNNEL SYNDROME ON LEFT: ICD-10-CM

## 2023-08-10 DIAGNOSIS — G56.02 CARPAL TUNNEL SYNDROME OF LEFT WRIST: ICD-10-CM

## 2023-08-10 DIAGNOSIS — K70.30 ALCOHOLIC CIRRHOSIS OF LIVER WITHOUT ASCITES (H): ICD-10-CM

## 2023-08-10 DIAGNOSIS — K70.30 ALCOHOLIC CIRRHOSIS OF LIVER WITHOUT ASCITES (H): Primary | ICD-10-CM

## 2023-08-10 DIAGNOSIS — E11.42 TYPE 2 DIABETES MELLITUS WITH DIABETIC POLYNEUROPATHY, WITH LONG-TERM CURRENT USE OF INSULIN (H): ICD-10-CM

## 2023-08-10 LAB
ALBUMIN SERPL BCG-MCNC: 4.7 G/DL (ref 3.5–5.2)
ALP SERPL-CCNC: 91 U/L (ref 40–129)
ALT SERPL W P-5'-P-CCNC: 26 U/L (ref 0–70)
ANION GAP SERPL CALCULATED.3IONS-SCNC: 11 MMOL/L (ref 7–15)
AST SERPL W P-5'-P-CCNC: 26 U/L (ref 0–45)
BILIRUB SERPL-MCNC: 0.5 MG/DL
BUN SERPL-MCNC: 9.6 MG/DL (ref 8–23)
CALCIUM SERPL-MCNC: 9.6 MG/DL (ref 8.8–10.2)
CHLORIDE SERPL-SCNC: 103 MMOL/L (ref 98–107)
CREAT SERPL-MCNC: 0.79 MG/DL (ref 0.67–1.17)
DEPRECATED HCO3 PLAS-SCNC: 27 MMOL/L (ref 22–29)
ERYTHROCYTE [DISTWIDTH] IN BLOOD BY AUTOMATED COUNT: 12.2 % (ref 10–15)
GFR SERPL CREATININE-BSD FRML MDRD: >90 ML/MIN/1.73M2
GLUCOSE SERPL-MCNC: 167 MG/DL (ref 70–99)
HBA1C MFR BLD: 6.7 %
HCT VFR BLD AUTO: 42.5 % (ref 40–53)
HGB BLD-MCNC: 14.5 G/DL (ref 13.3–17.7)
INR PPP: 1.11 (ref 0.85–1.15)
MCH RBC QN AUTO: 30 PG (ref 26.5–33)
MCHC RBC AUTO-ENTMCNC: 34.1 G/DL (ref 31.5–36.5)
MCV RBC AUTO: 88 FL (ref 78–100)
PLATELET # BLD AUTO: 151 10E3/UL (ref 150–450)
POTASSIUM SERPL-SCNC: 3.8 MMOL/L (ref 3.4–5.3)
PROT SERPL-MCNC: 7.6 G/DL (ref 6.4–8.3)
RBC # BLD AUTO: 4.83 10E6/UL (ref 4.4–5.9)
SODIUM SERPL-SCNC: 141 MMOL/L (ref 136–145)
WBC # BLD AUTO: 5.8 10E3/UL (ref 4–11)

## 2023-08-10 PROCEDURE — 99214 OFFICE O/P EST MOD 30 MIN: CPT | Mod: GC | Performed by: INTERNAL MEDICINE

## 2023-08-10 PROCEDURE — 83036 HEMOGLOBIN GLYCOSYLATED A1C: CPT | Performed by: PHYSICIAN ASSISTANT

## 2023-08-10 PROCEDURE — 80053 COMPREHEN METABOLIC PANEL: CPT | Performed by: PATHOLOGY

## 2023-08-10 PROCEDURE — 99214 OFFICE O/P EST MOD 30 MIN: CPT | Performed by: PHYSICIAN ASSISTANT

## 2023-08-10 PROCEDURE — 36415 COLL VENOUS BLD VENIPUNCTURE: CPT | Performed by: PATHOLOGY

## 2023-08-10 PROCEDURE — 85027 COMPLETE CBC AUTOMATED: CPT | Performed by: PATHOLOGY

## 2023-08-10 PROCEDURE — 85610 PROTHROMBIN TIME: CPT | Performed by: PATHOLOGY

## 2023-08-10 PROCEDURE — 99000 SPECIMEN HANDLING OFFICE-LAB: CPT | Performed by: PATHOLOGY

## 2023-08-10 PROCEDURE — G0463 HOSPITAL OUTPT CLINIC VISIT: HCPCS | Performed by: INTERNAL MEDICINE

## 2023-08-10 ASSESSMENT — ENCOUNTER SYMPTOMS
DYSRHYTHMIAS: 1
ORTHOPNEA: 0

## 2023-08-10 ASSESSMENT — LIFESTYLE VARIABLES: TOBACCO_USE: 1

## 2023-08-10 ASSESSMENT — PAIN SCALES - GENERAL
PAINLEVEL: EXTREME PAIN (9)
PAINLEVEL: EXTREME PAIN (9)

## 2023-08-10 ASSESSMENT — COPD QUESTIONNAIRES: COPD: 0

## 2023-08-10 NOTE — PATIENT INSTRUCTIONS
EGD and colonoscopy now    Abd Ultrasound now    Abd ultrasound and labs in 6 months    Clinic in 1 year      Cirrhosis Education    We recommend you go to the website: [email or web address] to review valuable information about your liver disease, good dietary choices, exercise options, and symptom management strategies    Nutrition  - For patients with cirrhosis, it is very important to eat the right types and amounts of foods.  We recommend a diet low in carbohydrates/sugars and high in fresh fruits/vegetables, with the right amount of protein.  We typically recommend 1.5gm/kg/day of protein, or  grams of protein every day.  - In regard to protein intake, you can focus on: All meats, cooked fish and seafood, vegetable-based protein meat products, tofu, eggs, legumes, dairy products (including milk and yogurt and cheese), unsalted nuts, etc...  - You should eat at least three meals a day and three to four snacks between meals  - Bedtime snacks are especially important (preferrably something with some protein)    - Patients with malnutrition and/or loss of muscular mass can improve their nutrition and muscular mass by drinking two cans of dietary supplements daily, particularly at bedtime.  These would include: Ensure, Boost, Magnolia Instant Milk, Glucerna, or powdered whey protein (or similar supplements)  - Please avoid eating raw seafood, especially shellfish, because of risk of serious illness  - We recommend all patients with cirrhosis limit their daily sodium intake to less than 2,000mg      General Liver Health  - Avoid the use of all non-steroid anti-inflammatory medicines (ibuprofen, Aleve, naproxen, aspirin, etc.) as this can cause serious injury to your kidneys in the setting of liver disease  - If you see a doctor and they prescribe you a new medication, please contact our clinic to let us know what changes are being made  - If you become acutely ill and present to an ER or are admitted to a  hospital, please let us know as soon as you are able.  - Patients with chronic liver disease be vaccinated against hepatitis A and B.  Please discuss with your primary provider the need for these vaccines  - As patients with liver disease are at an increased risk of developing osteoporosis, we recommend that you have a DEXA scan with appropriate follow up and treatment.   - We recommend screening for Vitamin D deficiency (at least yearly) and aggressive replacement/supplementation as warranted.    Sleep Troubles:  - Sleep issues are very common in patients with chronic liver disease  - Recommend strict avoidance of medications such as narcotics, sedatives and sleep aids.    - Low dose benadryl or melatonin can be used for insomnia, if needed.

## 2023-08-10 NOTE — PROGRESS NOTES
GI CLINIC VISIT    CC/REFERRING MD:  Referred Self  REASON FOR CONSULTATION:   Referred Self for   Chief Complaint   Patient presents with    RECHECK       ASSESSMENT/PLAN:    #Cirrhosis 2/2 Alcohol Use Disorder   #Portal Hypertensive Gastropathy  #Hx of GIB  MELD 3.0: 7 at 8/10/2023  7:10 AM  MELD-Na: 7 at 8/10/2023  7:10 AM  Calculated from:  Serum Creatinine: 0.79 mg/dL (Using min of 1 mg/dL) at 8/10/2023  7:10 AM  Serum Sodium: 141 mmol/L (Using max of 137 mmol/L) at 8/10/2023  7:10 AM  Total Bilirubin: 0.5 mg/dL (Using min of 1 mg/dL) at 8/10/2023  7:10 AM  Serum Albumin: 4.7 g/dL (Using max of 3.5 g/dL) at 8/10/2023  7:10 AM  INR(ratio): 1.11 at 8/10/2023  7:10 AM  Age at listing (hypothetical): 62 years  Sex: Male at 8/10/2023  7:10 AM    VS normal except high blood pressure in the clinic today(177/77). Has maintained abstinence from alcohol and there is currently low concern for relapse. Had presented with GIB initially and his last EGD was 8/2018 which showed portal hypertensive gastropathy. Currently taking Eliquis for his AF. His labs today in the clinic were reviewed and the liver function test was within normal limits. His platelet count today is 151 and the INR is 1.11 with low concern for any synthetic dysfunction of the liver. Currently low concern for HE and his sxs of nausea seem unlikely 2/2 to his liver dysfunction. He underwent last colonoscopy in 2016 and was supposed to repeat in 3 years.   Pt had some reservations regarding the EGD and colonoscopy today which were adequately addressed by Dr Leventhal and a referral was placed for the imaging.   - Future labs BMP, CBC, hepatic function tests, INR, AFP tumor marker ordered  - EGD, Colonoscopy, USG abdomen complete and elastography ordered   - RTC in 1 year   - Maintain abstinence from alcohol  - Avoid NSAIDs for chronic pain     The pt was seen and the pt care was discussed with Dr Leventhal Gurmandeep Singh [facility]  Internal Medicine  Resident PGY2  [facility]         HPI    Mr Erwin Aguilar is 62 year old M with pmh of chronic pain syndrome, CTS, T2DM, lumbosacral radiculopathy, cirrhosis 2/2 alcohol use disorder c/b GI bleeding and portal hypertension who presents for annual evaluation visit.    He does not report any significant complains from his GI perspective. He had  azn achillis tendon rupture and lumber radiculopathy that has been causing him significant pain and disability. Report that he is due to undergo surgery for his carpel tunnel syndrome which was postponed recently.   States that his appetite is decreased because of his chronic nausea (He was told that he has gastroparesis in the past but has not undergone any diagnostic work up for that). He is currently taking medical marijuana for his chronic nausea. He has following low fat diet and is taking frequent meals. Does not report any episodes of blood in his vomit or stools. No concern for recent color changes of his stools and no concerns for recent diarrhea or constipation. Does not repot any lethargy, resting tremors or confusion concerning for HE.his sleep has been disturbed lately but he attributes it to his chronic pain.  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   He does get some edema on his  R LE which he thinks is likely 2/2 to his tendon rupture and is not a/w resting the leg for prolonged duration.   Patient denies any chest pain, SOB, exertional dyspnea/orthopnea/PND, dizziness or lightheadedness.        ROS:    10 point ROS neg other than the symptoms noted above in the HPI.    PREVIOUS ENDOSCOPY:  EGD 8/2018  Impression:                          - Normal esophagus.                        - Hematin (altered blood/coffee-ground-like material) in                        the stomach.                        - Normal examined duodenum.                        - Portal  hypertensive gastropathy.                        - No specimens collected.     Colonoscopy 2016  Impression:                                - Stool in the entire examined colon.                        - One 4 mm polyp in the sigmoid colon. Resected and                        retrieved.                        - The distal rectum and anal verge are normal on                        retroflexion view         PERTINENT RELEVANT IMAGING OR LABS:    USG abdomen 2022  IMPRESSION:  1. Cirrhotic hepatic morphology. No focal hepatic lesion.  a. LI-RADS US Category: US-1 Negative: No US evidence of HCC  b. Recommend continued surveillance US.  2. Mild splenomegaly suggests portal hypertension.  3. Cholelithiasis.    ALLERGIES:     Allergies   Allergen Reactions    Levaquin Anaphylaxis and Rash     Swelling in lip and tongue felt thick    Lisinopril Anaphylaxis     Swollen tongue; inability to swallow; drooling; hives; swollen face, neck, angioedema    Acetaminophen      Hx of cirrhosis     Amlodipine      Swelling, hives, possible angioedema      Morphine      b/p dropped and arms went numb  Hypotension    Quinolones Hives    Spironolactone Unknown     Pt believes himself to be allergic to it; cannot find his old records of this.-[facility] [place]     Bactrim [Sulfamethoxazole-Trimethoprim] Rash       PERTINENT MEDICATIONS:    Current Outpatient Medications:     ACE/ARB NOT PRESCRIBED, INTENTIONAL,, ACE & ARB not prescribed due to Allergy, Disp: , Rfl:     albuterol (PROAIR HFA/PROVENTIL HFA/VENTOLIN HFA) 108 (90 Base) MCG/ACT inhaler, INHALE 2 PUFFS BY MOUTH EVERY 6 HOURS AS NEEDED FOR SHORTNESS OF BREATH/DYSPNEA OR WHEEZING, Disp: 18 g, Rfl: 1    apixaban ANTICOAGULANT (ELIQUIS ANTICOAGULANT) 5 MG tablet, Take 1 tablet (5 mg) by mouth 2 times daily, Disp: 180 tablet, Rfl: 1    baclofen (LIORESAL) 10 MG tablet, TAKE 2 TABLETS BY MOUTH THREE TIMES DAILY, Disp: 180 tablet, Rfl: 3    doxazosin (CARDURA) 8 MG tablet, TAKE 1 TABLET (8 MG) BY  MOUTH AT BEDTIME, Disp: 90 tablet, Rfl: 1    gabapentin (NEURONTIN) 300 MG capsule, TAKE 1 CAPSULE BY MOUTH IN THE AFTERNOON AND 2 CAPSULES AT BEDTIME (Patient taking differently: TAKE 2 CAPSULE BY MOUTH IN THE MORNING AND 1 CAPSULES AT BEDTIME), Disp: 90 capsule, Rfl: 1    GLOBAL EASE INJECT PEN NEEDLES 32G X 4 MM miscellaneous, USE AS DIRECTED EVERY DAY, Disp: 100 each, Rfl: 1    insulin glargine (LANTUS PEN) 100 UNIT/ML pen, Inject 26 Units Subcutaneous At Bedtime, Disp: 30 mL, Rfl: 3    medical cannabis (Patient's own supply), See Admin Instructions (The purpose of this order is to document that the patient reports taking medical cannabis.  This is not a prescription, and is not used to certify that the patient has a qualifying medical condition.)  Flower - PRN, Disp: , Rfl:     oxyCODONE (ROXICODONE) 5 MG tablet, Take 1 tablet (5 mg) by mouth 3 times daily (visit with Dr. Wegener every three months) (Patient taking differently: Take 5 mg by mouth 3 times daily as needed for pain (visit with Dr. Wegener every three months)), Disp: 90 tablet, Rfl: 0    promethazine (PHENERGAN) 25 MG tablet, TAKE 1 TABLET(25 MG) BY MOUTH THREE TIMES DAILY AS NEEDED FOR NAUSEA, Disp: 90 tablet, Rfl: 4    propranolol (INDERAL) 40 MG tablet, Take 2 tablets (80 mg) by mouth 2 times daily, Disp: 360 tablet, Rfl: 3    rosuvastatin (CRESTOR) 20 MG tablet, Take 1 tablet (20 mg) by mouth daily, Disp: 90 tablet, Rfl: 3    sertraline (ZOLOFT) 100 MG tablet, TAKE 2 TABLETS BY MOUTH DAILY (Patient taking differently: Take 200 mg by mouth daily TAKE 2 TABLETS BY MOUTH DAILY), Disp: 180 tablet, Rfl: 3    triamterene-HCTZ (DYAZIDE) 37.5-25 MG capsule, Take 2 capsules by mouth every morning (Patient taking differently: Take 1 capsule by mouth every morning), Disp: 180 capsule, Rfl: 3    PROBLEM LIST  Patient Active Problem List    Diagnosis Date Noted    Carpal tunnel syndrome of left wrist 07/13/2023     Priority: Medium    Cubital tunnel  syndrome on left 06/05/2023     Priority: Medium     Added automatically from request for surgery [id number]      Left carpal tunnel syndrome 06/05/2023     Priority: Medium     Added automatically from request for surgery [id number]      Adjustment disorder with anxious mood 10/26/2021     Priority: Medium    Psychophysiological insomnia 10/26/2021     Priority: Medium    At risk for prolonged QT interval syndrome 10/26/2021     Priority: Medium    Paroxysmal atrial fibrillation (H) 09/22/2021     Priority: Medium    Transient ischemic attack (TIA) 09/07/2021     Priority: Medium    Acquired calcaneus deformity of right ankle 05/06/2021     Priority: Medium    H/O foot surgery 05/06/2021     Priority: Medium    Calcific Achilles tendinitis 05/06/2021     Priority: Medium    Contact with and (suspected) exposure to covid-19 01/25/2021     Priority: Medium    Right ankle pain 10/20/2020     Priority: Medium     Added automatically from request for surgery [id number]      Gastroparesis 10/08/2019     Priority: Medium    Alcoholism in remission (H) 10/08/2019     Priority: Medium    Osteoarthritis of lumbar spine, unspecified spinal osteoarthritis complication status 09/17/2018     Priority: Medium    S/P insertion of spinal cord stimulator 09/17/2018     Priority: Medium    Hematemesis with nausea 06/30/2018     Priority: Medium    Benign neoplasm of skin of trunk, except scrotum 01/12/2018     Priority: Medium    Type 2 diabetes mellitus with diabetic polyneuropathy, with long-term current use of insulin (H) 05/09/2017     Priority: Medium    Wound, open, buttock, right 04/20/2017     Priority: Medium    Type 2 diabetes mellitus without complication, with long-term current use of insulin (H) 10/06/2016     Priority: Medium    Calculus of gallbladder without cholecystitis without obstruction 05/10/2016     Priority: Medium    Lumbosacral radiculopathy 02/10/2016     Priority: Medium     S/p SCS implant 2010- [employer]       Alcoholic cirrhosis of liver without ascites (H) 10/13/2015     Priority: Medium    Right knee pain 09/20/2015     Priority: Medium    Fall 07/11/2015     Priority: Medium    Closed fracture of right patella 07/11/2015     Priority: Medium     Diagnosis updated by automated process. Provider to review and confirm.      Open wound of right heel 12/23/2014     Priority: Medium    Moderate persistent asthma 09/04/2014     Priority: Medium    Ganglion cyst 06/04/2014     Priority: Medium    Dry skin dermatitis 06/04/2014     Priority: Medium    Superficial thrombophlebitis of arm 04/03/2014     Priority: Medium    Cellulitis of hand 04/03/2014     Priority: Medium    Skin infection 04/02/2014     Priority: Medium    Cirrhosis of liver (H) 04/01/2014     Priority: Medium     Not biopsy-proven as of 4/1/14.      Anemia due to blood loss, acute 04/01/2014     Priority: Medium    Edema of left lower extremity 03/31/2014     Priority: Medium    Muscle spasms of Upper extremity 01/20/2014     Priority: Medium    Atopic dermatitis 01/17/2014     Priority: Medium    Nausea without vomiting 11/27/2013     Priority: Medium     Problem list name updated by automated process. Provider to review      Cellulitis 11/01/2013     Priority: Medium    Wound infection 08/05/2013     Priority: Medium    Hypokalemia 11/06/2012     Priority: Medium    Diastasis recti 11/05/2012     Priority: Medium    Hernia 10/25/2012     Priority: Medium    Abnormal EMG 05/01/2012     Priority: Medium    Neuropathy in diabetes (H) 12/06/2011     Priority: Medium    Health Care Home 10/28/2011     Priority: Medium     EMERGENCY CARE PLAN  Presenting Problem Signs and Symptoms Treatment Plan    Questions or conerns during clinic hours    I will call the clinic directly [phone]     Questions or conerns outside clinic hours    I will call the 24 hour nurse line at [phone]    Patient needs to schedule an appointment    I will call the 24 hour  scheduling team at [phone] or clinic directly    Same day treatment     I will call the clinic first, nurse line if after hours, urgent care and express care if needed                              DX V65.8 REPLACED WITH [id number] HEALTH CARE HOME (04/08/2013)      Abnormal liver function tests 06/14/2011     Priority: Medium    GERD (gastroesophageal reflux disease) 06/13/2011     Priority: Medium    Chronic pain syndrome 03/14/2011     Priority: Medium     Failed back surgery L5-S1   August 2009  Post-op complications: right dropped foot, bowel and bladder involvement    Patient is followed by WEGENER, JOEL DANIEL IRWIN for ongoing prescription of pain medication.  All refills should be approved by this provider, or covering partner.    Medication(s): oxycodone.   Maximum quantity per month: see sig  Clinic visit frequency required: Q 3 months     Controlled substance agreement on file: Yes       Date(s):  March 1, 2016      Pain Clinic evaluation in the past:      Date/Location:  non-[facility], spinal stimulator,  [facility] x one 2015.     DIRE Total Score(s):  No flowsheet data found.    Last [facility] website verification: March 1, 2016     [email or web address]        Major depressive disorder, recurrent episode, mild (H) 12/13/2010     Priority: Medium    Hypertension goal BP (blood pressure) < 140/90 12/06/2010     Priority: Medium    Type 2 diabetes, HbA1c goal < 7% (H) 10/31/2010     Priority: Medium     Per provider        Hyperlipidemia LDL goal <100 10/31/2010     Priority: Medium     Per provider        Stress 09/30/2010     Priority: Medium    Physical deconditioning 09/30/2010     Priority: Medium    Neuropathic pain syndrome (non-herpetic) 09/30/2010     Priority: Medium    Neurogenic bowel 09/30/2010     Priority: Medium    Neurogenic bladder 09/30/2010     Priority: Medium    Gait abnormality 09/30/2010     Priority: Medium    Foot-drop 09/30/2010     Priority: Medium    CRPS (complex regional  pain syndrome type I) 09/30/2010     Priority: Medium    Generalized muscle weakness 06/19/2009     Priority: Medium    Generalized anxiety disorder      Priority: Medium    Noninfectious gastroenteritis and colitis 07/27/2000     Priority: Medium     Formatting of this note might be different from the original. Other and unspecified noninfectious gastroenteritis and colitis(558.9)         PERTINENT PAST MEDICAL HISTORY:  Past Medical History:   Diagnosis Date    Actinic keratosis     aldara    Anxiety     Cancer (H)     squamous cell skin CA    Cauda equina spinal cord injury (H)     Chronic sinusitis 5-1-16    Depressive disorder     Diastasis recti     Esophageal reflux     Esophageal varices in cirrhosis (H) 4/1/2014    Hospitalized for UGI blee 3/28/14, endoscopy revealed bleeding varices.    Essential hypertension, benign     Intermittent asthma     Mild depression     Mixed hyperlipidemia     Nasal polyps 5-1-16    Osteoarthritis of lumbar spine, unspecified spinal osteoarthritis complication status     Other chronic pain     Paroxysmal atrial fibrillation (H) 9/22/2021    SCCA (squamous cell carcinoma) of skin     Seasonal allergic conjunctivitis     Type II or unspecified type diabetes mellitus without mention of complication, not stated as uncontrolled     Unspecified site of spinal cord injury without evidence of spinal bone injury     due to back surgery       PREVIOUS SURGERIES:  Past Surgical History:   Procedure Laterality Date    ABDOMEN SURGERY  2014    BACK SURGERY  August 2009    COLONOSCOPY N/A 5/12/2016    Procedure: COMBINED COLONOSCOPY, SINGLE OR MULTIPLE BIOPSY/POLYPECTOMY BY BIOPSY;  Surgeon: Ana Paula Urbina MD;  Location:  GI    ENDOSCOPY UPPER, COLONOSCOPY, COMBINED  10/19/2011    Procedure:COMBINED ENDOSCOPY UPPER, COLONOSCOPY; Upper Endoscopy, Colonoscopy with Polypectomy x4; Surgeon:AMBAR RODRÍGUEZ; Location:[facility] OR    ENT SURGERY  1-2016    Ongoing since then     ESOPHAGOSCOPY, GASTROSCOPY, DUODENOSCOPY (EGD), COMBINED  3/28/2014    Procedure: COMBINED ESOPHAGOSCOPY, GASTROSCOPY, DUODENOSCOPY (EGD);  EGD, Gastric Biopsies, Esophageal Banding;  Surgeon: Reyna Tovar MD;  Location: [facility] OR    ESOPHAGOSCOPY, GASTROSCOPY, DUODENOSCOPY (EGD), COMBINED  6/9/2014    Procedure: COMBINED ESOPHAGOSCOPY, GASTROSCOPY, DUODENOSCOPY (EGD);  Surgeon: Curtis Mendez MD;  Location: [facility] GI    ESOPHAGOSCOPY, GASTROSCOPY, DUODENOSCOPY (EGD), COMBINED  7/24/2014    Procedure: COMBINED ESOPHAGOSCOPY, GASTROSCOPY, DUODENOSCOPY (EGD);  Surgeon: Gerard Samaniego MD;  Location: [facility] OR    ESOPHAGOSCOPY, GASTROSCOPY, DUODENOSCOPY (EGD), COMBINED N/A 10/31/2014    Procedure: COMBINED ESOPHAGOSCOPY, GASTROSCOPY, DUODENOSCOPY (EGD);  Surgeon: Gerard Samanigeo MD;  [facility]: [facility] OR    ESOPHAGOSCOPY, GASTROSCOPY, DUODENOSCOPY (EGD), COMBINED N/A 5/12/2016    Procedure: COMBINED ESOPHAGOSCOPY, GASTROSCOPY, DUODENOSCOPY (EGD);  Surgeon: Ana Paula Urbina MD;  Location: [facility] GI    ESOPHAGOSCOPY, GASTROSCOPY, DUODENOSCOPY (EGD), COMBINED N/A 8/2/2018    Procedure: COMBINED ESOPHAGOSCOPY, GASTROSCOPY, DUODENOSCOPY (EGD);  EGD;  Surgeon: Yu Wagner MD;  Location: [facility] GI    HCL SQUAMOUS CELL CARCINOMA AG  10/07    left forearm    HERNIORRHAPHY UMBILICAL  11/8/2012    Procedure: HERNIORRHAPHY UMBILICAL;  Open Umbilical Hernia Repair With Mesh ;  Surgeon: Chase Nicholson MD;  Location: [facility] OR    INSERT STIMULATOR DORSAL COLUMN N/A 6/28/2018    Procedure: INSERT STIMULATOR DORSAL COLUMN;;  Surgeon: Elvis Sauceda MD;  [facility] OR    neuro stimulator  2010    REMOVE GENERATOR STIMULATOR (LOCATION) N/A 6/28/2018    Procedure: REMOVE GENERATOR STIMULATOR (LOCATION);  Spinal Cord Stimulator and IPG Explant and Re-Implant of SCS System (Leads and IPG);  Surgeon: Elvis Sauceda MD;  [facility]    REPAIR TENDON ACHILLES Right  2020    Procedure: Right achilles debridement and partial calcaneus excision;  Surgeon: Eh Sandoval MD;  [facility]    SURGICAL HISTORY OF -       abcess tooth    SURGICAL HISTORY OF -       L4-5 laminectomy, cauda equina syndrome    SURGICAL HISTORY OF -   +    herniated disk repair    TONSILLECTOMY  10 1964    TRANSPOSITION ULNAR NERVE (ELBOW)      right    ZZC APPENDECTOMY  1974       SOCIAL HISTORY:  Social History     Socioeconomic History    Marital status: Single     Spouse name: Not on file    Number of children: 0    Years of education: Not on file    Highest education level: Not on file   Occupational History    Occupation: sales     Employer: UNEMPLOYED   Tobacco Use    Smoking status: Former     Packs/day: 0.00     Years: 1.00     Pack years: 0.00     Types: Cigarettes     Start date: 10/13/1983     Quit date: 1984     Years since quittin.1     Passive exposure: Past    Smokeless tobacco: Never   Vaping Use    Vaping Use: Never used   Substance and Sexual Activity    Alcohol use: Not Currently     Comment: a pint of alcohol / day - last drink was 3/28/14    Drug use: Yes     Frequency: 2.0 times per week     Types: Marijuana     Comment: medical marijuana - vape daily    Sexual activity: Not Currently     Partners: Female     Birth control/protection: Abstinence   Other Topics Concern    Parent/sibling w/ CABG, MI or angioplasty before 65F 55M? No   Social History Narrative    Not on file     Social Determinants of Health     Financial Resource Strain: Not on file   Food Insecurity: Not on file   Transportation Needs: No Transportation Needs (2020)    PRAPARE - Transportation     Lack of Transportation (Medical): No     Lack of Transportation (Non-Medical): No   Physical Activity: Inactive (2020)    Exercise Vital Sign     Days of Exercise per Week: 0 days     Minutes of Exercise per Session: 0 min   Stress: Not on file   Social Connections: Unknown  (12/31/2020)    Social Connection and Isolation Panel [NHANES]     Frequency of Communication with Friends and Family: Not on file     Frequency of Social Gatherings with Friends and Family: Not on file     Attends Gnosticism Services: Not on file     Active Member of Clubs or Organizations: Not on file     Attends Club or Organization Meetings: Not on file     Marital Status:    Intimate Partner Violence: Not on file   Housing Stability: Not on file       FAMILY HISTORY:  Family History   Problem Relation Age of Onset    Cancer Mother         lung    Breast Cancer Mother     Other Cancer Mother     Cancer Father         esophogeal, GERD    Snoring Father     Diabetes Brother         amputation, Type 1    Diabetes Brother     Diabetes Brother     Cancer - colorectal No family hx of     Glaucoma No family hx of     Macular Degeneration No family hx of     Anesthesia Reaction No family hx of     Venous thrombosis No family hx of        Past/family/social history reviewed and no changes    PHYSICAL EXAMINATION:  Constitutional: aaox3, cooperative, pleasant, not dyspneic/diaphoretic, no acute distress  Vitals reviewed: BP (!) 177/77 (BP Location: Right arm, Patient Position: Sitting, Cuff Size: Adult Large)   Pulse 64   Temp 97.6  F (36.4  C) (Oral)   Wt 99.4 kg (219 lb 3.2 oz)   SpO2 98%   BMI 30.57 kg/m    Wt:   Wt Readings from Last 2 Encounters:   08/10/23 99.4 kg (219 lb 3.2 oz)   07/03/23 97.5 kg (215 lb)      Eyes: Sclera anicteric/injected  Ears/nose/mouth/throat: Normal oropharynx without ulcers or exudate, mucus membranes moist, hearing intact  Neck: supple, thyroid normal size  CV: No edema  Respiratory: Unlabored breathing  Lymph: No axillary, submandibular, supraclavicular or inguinal lymphadenopathy  Abd: Nondistended, +bs, no hepatosplenomegaly, nontender, no peritoneal signs  Skin: warm, perfused, no jaundice  Psych: Normal affect  MSK: Slight limp 2/2 to his tendon rupture on R LE

## 2023-08-10 NOTE — NURSING NOTE
Chief Complaint   Patient presents with    RECHECK   BP (!) 194/84 (BP Location: Right arm, Patient Position: Sitting, Cuff Size: Adult Large)   Pulse 64   Temp 97.6  F (36.4  C) (Oral)   Wt 99.4 kg (219 lb 3.2 oz)   SpO2 98%   BMI 30.57 kg/m  Renata Redd on 8/10/2023 at 8:09 AM

## 2023-08-10 NOTE — H&P
Pre-Operative H & P     CC:  Preoperative exam to assess for increased cardiopulmonary risk while undergoing surgery and anesthesia.    Date of Encounter: 8/10/2023  Primary Care Physician:  Wegener, Joel Daniel Irwin     Reason for visit:   Encounter Diagnoses   Name Primary?    Pre-op evaluation Yes    Cubital tunnel syndrome on left     Carpal tunnel syndrome of left wrist     Type 2 diabetes mellitus with diabetic polyneuropathy, with long-term current use of insulin (H)        HPI  Erwin Aguilar is a 62 year old male who presents for pre-operative H & P in preparation for  Procedure Information       Case: [id number] Date/Time: 08/31/23 1250    Procedures:       LEFT CUBITAL TUNNEL RELEASE, (Left: Elbow)      LEFT OPEN CARPAL TUNNEL RELEASE, (Left: Wrist)    Anesthesia type: MAC with Block    Diagnosis:       Cubital tunnel syndrome on left [G56.22]      Carpal tunnel syndrome of left wrist [G56.02]    Pre-op diagnosis:       Cubital tunnel syndrome on left [G56.22]      Carpal tunnel syndrome of left wrist [G56.02]    Location: [place] [address] / [facility]    Providers: Deny Dixon MD            Patient is being evaluated for comorbid conditions of hypertension, hyperlipidemia, paroxysmal atrial fibrillation, asthma, chronic sinusitis, anemia, osteoarthritis, type 2 diabetes, GERD, cirrhosis, gastroparesis, anxiety, depression, and history of alcohol abuse.     He has a history of left cubital tunnel syndrome for which she follows with Dr. Dixon orthopedic surgery.  He was last seen in follow-up on 6/5/2023 where he noted ongoing symptoms.  Treatment options were discussed at that visit and a plan was made to proceed with surgery as scheduled above.     History is obtained from the patient and chart review     Hx of abnormal bleeding, anticoagulation, or anti-platelet use: Eliquis      Past Medical History  Past Medical History:   Diagnosis Date    Actinic keratosis      aldara    Anxiety     Cancer (H)     squamous cell skin CA    Cauda equina spinal cord injury (H)     Chronic sinusitis 5-1-16    Depressive disorder     Diastasis recti     Esophageal reflux     Esophageal varices in cirrhosis (H) 4/1/2014    Hospitalized for UGI blee 3/28/14, endoscopy revealed bleeding varices.    Essential hypertension, benign     Intermittent asthma     Mild depression     Mixed hyperlipidemia     Nasal polyps 5-1-16    Osteoarthritis of lumbar spine, unspecified spinal osteoarthritis complication status     Other chronic pain     Paroxysmal atrial fibrillation (H) 9/22/2021    SCCA (squamous cell carcinoma) of skin     Seasonal allergic conjunctivitis     Type II or unspecified type diabetes mellitus without mention of complication, not stated as uncontrolled     Unspecified site of spinal cord injury without evidence of spinal bone injury     due to back surgery       Past Surgical History  Past Surgical History:   Procedure Laterality Date    ABDOMEN SURGERY  2014    BACK SURGERY  August 2009    COLONOSCOPY N/A 5/12/2016    Procedure: COMBINED COLONOSCOPY, SINGLE OR MULTIPLE BIOPSY/POLYPECTOMY BY BIOPSY;  Surgeon: Ana Paula Urbina MD;  Location:  GI    ENDOSCOPY UPPER, COLONOSCOPY, COMBINED  10/19/2011    Procedure:COMBINED ENDOSCOPY UPPER, COLONOSCOPY; Upper Endoscopy, Colonoscopy with Polypectomy x4; Surgeon:AMBAR ORDRÍGUEZ; Location: OR    ENT SURGERY  1-2016    Ongoing since then    ESOPHAGOSCOPY, GASTROSCOPY, DUODENOSCOPY (EGD), COMBINED  3/28/2014    Procedure: COMBINED ESOPHAGOSCOPY, GASTROSCOPY, DUODENOSCOPY (EGD);  EGD, Gastric Biopsies, Esophageal Banding;  Surgeon: Reyna Tovar MD;  Location:  OR    ESOPHAGOSCOPY, GASTROSCOPY, DUODENOSCOPY (EGD), COMBINED  6/9/2014    Procedure: COMBINED ESOPHAGOSCOPY, GASTROSCOPY, DUODENOSCOPY (EGD);  Surgeon: Curtis Mendez MD;  Location:  GI    ESOPHAGOSCOPY, GASTROSCOPY, DUODENOSCOPY (EGD), COMBINED   7/24/2014    Procedure: COMBINED ESOPHAGOSCOPY, GASTROSCOPY, DUODENOSCOPY (EGD);  Surgeon: Gerard Samaniego MD;  Location: [facility] OR    ESOPHAGOSCOPY, GASTROSCOPY, DUODENOSCOPY (EGD), COMBINED N/A 10/31/2014    Procedure: COMBINED ESOPHAGOSCOPY, GASTROSCOPY, DUODENOSCOPY (EGD);  Surgeon: Gerard Samaniego MD;  Location: [facility] OR    ESOPHAGOSCOPY, GASTROSCOPY, DUODENOSCOPY (EGD), COMBINED N/A 5/12/2016    Procedure: COMBINED ESOPHAGOSCOPY, GASTROSCOPY, DUODENOSCOPY (EGD);  Surgeon: Ana Paula Urbina MD;  Location: [facility] GI    ESOPHAGOSCOPY, GASTROSCOPY, DUODENOSCOPY (EGD), COMBINED N/A 8/2/2018    Procedure: COMBINED ESOPHAGOSCOPY, GASTROSCOPY, DUODENOSCOPY (EGD);  EGD;  Surgeon: Yu Wagner MD;  Location: [facility] GI    HCL SQUAMOUS CELL CARCINOMA AG  10/07    left forearm    HERNIORRHAPHY UMBILICAL  11/8/2012    Procedure: HERNIORRHAPHY UMBILICAL;  Open Umbilical Hernia Repair With Mesh ;  Surgeon: Chase Nicholson MD;  Location: [facility] OR    INSERT STIMULATOR DORSAL COLUMN N/A 6/28/2018    Procedure: INSERT STIMULATOR DORSAL COLUMN;;  Surgeon: Elvis Sauceda MD;  Location: [facility] OR    neuro stimulator  2010    REMOVE GENERATOR STIMULATOR (LOCATION) N/A 6/28/2018    Procedure: REMOVE GENERATOR STIMULATOR (LOCATION);  Spinal Cord Stimulator and IPG Explant and Re-Implant of SCS System (Leads and IPG);  Surgeon: Elvis Sauceda MD;  Location: [facility] OR    REPAIR TENDON ACHILLES Right 11/11/2020    Procedure: Right achilles debridement and partial calcaneus excision;  Surgeon: Eh Sandoval MD;  Location: [facility] OR    SURGICAL HISTORY OF -   1/02    abcess tooth    SURGICAL HISTORY OF -   1999    L4-5 laminectomy, cauda equina syndrome    SURGICAL HISTORY OF -   +    herniated disk repair    TONSILLECTOMY  10 1964    TRANSPOSITION ULNAR NERVE (ELBOW)      right    ZZC APPENDECTOMY  1974       Prior to Admission Medications  Current Outpatient Medications    Medication Sig Dispense Refill    albuterol (PROAIR HFA/PROVENTIL HFA/VENTOLIN HFA) 108 (90 Base) MCG/ACT inhaler INHALE 2 PUFFS BY MOUTH EVERY 6 HOURS AS NEEDED FOR SHORTNESS OF BREATH/DYSPNEA OR WHEEZING 18 g 1    apixaban ANTICOAGULANT (ELIQUIS ANTICOAGULANT) 5 MG tablet Take 1 tablet (5 mg) by mouth 2 times daily 180 tablet 1    baclofen (LIORESAL) 10 MG tablet TAKE 2 TABLETS BY MOUTH THREE TIMES DAILY 180 tablet 3    doxazosin (CARDURA) 8 MG tablet TAKE 1 TABLET (8 MG) BY MOUTH AT BEDTIME 90 tablet 1    gabapentin (NEURONTIN) 300 MG capsule TAKE 1 CAPSULE BY MOUTH IN THE AFTERNOON AND 2 CAPSULES AT BEDTIME (Patient taking differently: TAKE 2 CAPSULE BY MOUTH IN THE MORNING AND 1 CAPSULES AT BEDTIME) 90 capsule 1    insulin glargine (LANTUS PEN) 100 UNIT/ML pen Inject 26 Units Subcutaneous At Bedtime 30 mL 3    medical cannabis (Patient's own supply) See Admin Instructions (The purpose of this order is to document that the patient reports taking medical cannabis.  This is not a prescription, and is not used to certify that the patient has a qualifying medical condition.)   Flower - PRN      oxyCODONE (ROXICODONE) 5 MG tablet Take 1 tablet (5 mg) by mouth 3 times daily (visit with Dr. Wegener every three months) (Patient taking differently: Take 5 mg by mouth 3 times daily as needed for pain (visit with Dr. Wegener every three months)) 90 tablet 0    promethazine (PHENERGAN) 25 MG tablet TAKE 1 TABLET(25 MG) BY MOUTH THREE TIMES DAILY AS NEEDED FOR NAUSEA 90 tablet 4    propranolol (INDERAL) 40 MG tablet Take 2 tablets (80 mg) by mouth 2 times daily 360 tablet 3    rosuvastatin (CRESTOR) 20 MG tablet Take 1 tablet (20 mg) by mouth daily 90 tablet 3    sertraline (ZOLOFT) 100 MG tablet TAKE 2 TABLETS BY MOUTH DAILY (Patient taking differently: Take 200 mg by mouth daily TAKE 2 TABLETS BY MOUTH DAILY) 180 tablet 3    triamterene-HCTZ (DYAZIDE) 37.5-25 MG capsule Take 2 capsules by mouth every morning (Patient  taking differently: Take 1 capsule by mouth every morning) 180 capsule 3    ACE/ARB NOT PRESCRIBED, INTENTIONAL, ACE & ARB not prescribed due to Allergy      GLOBAL EASE INJECT PEN NEEDLES 32G X 4 MM miscellaneous USE AS DIRECTED EVERY  each 1       Allergies  Allergies   Allergen Reactions    Levaquin Anaphylaxis and Rash     Swelling in lip and tongue felt thick    Lisinopril Anaphylaxis     Swollen tongue; inability to swallow; drooling; hives; swollen face, neck, angioedema    Acetaminophen      Hx of cirrhosis     Amlodipine      Swelling, hives, possible angioedema      Morphine      b/p dropped and arms went numb  Hypotension    Quinolones Hives    Spironolactone Unknown     Pt believes himself to be allergic to it; cannot find his old records of this.-mjc     Bactrim [Sulfamethoxazole-Trimethoprim] Rash       Social History  Social History     Socioeconomic History    Marital status: Single     Spouse name: Not on file    Number of children: 0    Years of education: Not on file    Highest education level: Not on file   Occupational History    Occupation: sales     Employer: UNEMPLOYED   Tobacco Use    Smoking status: Former     Packs/day: 0.00     Years: 1.00     Pack years: 0.00     Types: Cigarettes     Start date: 10/13/1983     Quit date: 1984     Years since quittin.1     Passive exposure: Past    Smokeless tobacco: Never   Vaping Use    Vaping Use: Never used   Substance and Sexual Activity    Alcohol use: Not Currently     Comment: a pint of alcohol / day - last drink was 3/28/14    Drug use: Yes     Frequency: 2.0 times per week     Types: Marijuana     Comment: medical marijuana - vape daily    Sexual activity: Not Currently     Partners: Female     Birth control/protection: Abstinence   Other Topics Concern    Parent/sibling w/ CABG, MI or angioplasty before 65F 55M? No   Social History Narrative    Not on file     Social Determinants of Health     Financial Resource Strain: Not on  file   Food Insecurity: Not on file   Transportation Needs: No Transportation Needs (12/31/2020)    PRAPARE - Transportation     Lack of Transportation (Medical): No     Lack of Transportation (Non-Medical): No   Physical Activity: Inactive (12/31/2020)    Exercise Vital Sign     Days of Exercise per Week: 0 days     Minutes of Exercise per Session: 0 min   Stress: Not on file   Social Connections: Unknown (12/31/2020)    Social Connection and Isolation Panel [NHANES]     Frequency of Communication with Friends and Family: Not on file     Frequency of Social Gatherings with Friends and Family: Not on file     Attends [employer] Services: Not on file     Active Member of Clubs or Organizations: Not on file     Attends Club or Organization Meetings: Not on file     Marital Status:    Intimate Partner Violence: Not on file   Housing Stability: Not on file       Family History  Family History   Problem Relation Age of Onset    Cancer Mother         lung    Breast Cancer Mother     Other Cancer Mother     Cancer Father         esophogeal, GERD    Snoring Father     Diabetes Brother         amputation, Type 1    Diabetes Brother     Diabetes Brother     Cancer - colorectal No family hx of     Glaucoma No family hx of     Macular Degeneration No family hx of     Anesthesia Reaction No family hx of     Venous thrombosis No family hx of        Review of Systems  The complete review of systems is negative other than noted in the HPI or here.   Anesthesia Evaluation   Pt has had prior anesthetic.     No history of anesthetic complications       ROS/MED HX  ENT/Pulmonary: Comment: Chronic sinusitis    (+)     ALISIA risk factors,  hypertension,         tobacco use, Past use,   Intermittent, asthma  Treatment: Inhaler prn,              (-) COPD   Neurologic: Comment: History of cauda equina    (+)                     Spinal cord injury (surgical injury), year sustained: 2009,          Cardiovascular:     (+) Dyslipidemia  hypertension- -   -  - -                        dysrhythmias, a-fib,        Previous cardiac testing   Echo: Date: 9/7/2021 Results:  Interpretation Summary   Global and regional left ventricular function is normal with an EF of 55-60%.   Right ventricular function, chamber size, wall motion, and thickness are normal.   No hemodynamcially signifcant valvular abnormalities.   Estimated mean RA pressure is mildly elevated at 8 mmHg.   No pericardial effusion is present.   There is no prior study for direct comparison.  Stress Test:  Date: Results:    ECG Reviewed:  Date: 10/25/2021 Results:  Holter  Conclusion: A tendency toward sinus bradycardia was noted throughout the monitoring time with average resting heart rate approximately 45 bpm and average heart rate less than 65 bpm which could indicate some chronotropic incompetence in an active patient.  These findings could be consistent with sinus node dysfunction.  No atrial fibrillation was noted.   Cath:  Date: Results:   (-) CAD, CHAVEZ, orthopnea/PND and irregular heartbeat/palpitations   METS/Exercise Tolerance: 3 - Able to walk 1-2 blocks without stopping Comment: Limited due to chronic pain, occasionally walks with a cane. Denies cardiac symptoms such as chest pain/pressure, dizziness, CHAVEZ, orthopnea.    Riding electric bike, but only uses left foot to pedal - rides 18 miles per day.    Hematologic:     (+)      anemia, history of blood transfusion, no previous transfusion reaction, Known PRBC Anitbodies:No    (-) history of blood clots   Musculoskeletal: Comment: History of drop foot, right  Ruptured achilles tendon, right  (+)  arthritis,             GI/Hepatic: Comment: Gastroparesis  History of esophageal varices with bleed  Chronic nausea on phenergan    (+)             liver disease (Cirrhosis),    (-) GERD   Renal/Genitourinary:  - neg Renal ROS     Endo:     (+)  type II DM, Last HgA1c: 5.8, date: 5/1/2023, Using insulin, - not using insulin pump.  not  "previously admitted for DM/DKA.           (-) thyroid disease and chronic steroid usage   Psychiatric/Substance Use:     (+) psychiatric history anxiety and depression alcohol abuse (sober since 2014)  Recreational drug usage: Cannabis.    Infectious Disease:  - neg infectious disease ROS     Malignancy:   (+) Malignancy, History of Skin.Skin CA Remission status post Surgery.      Other:  - neg other ROS    (+)  , H/O Chronic Pain,         BP (!) 199/96 (BP Location: Right arm, Patient Position: Chair, Cuff Size: Adult Large)   Pulse 78   Temp 98.7  F (37.1  C) (Oral)   Resp 16   Ht 1.803 m (5' 11\")   Wt 99.4 kg (219 lb 2.2 oz)   SpO2 100%   BMI 30.56 kg/m      Physical Exam   Constitutional: Pleasant, well-appearing male, no apparent distress, and appears stated age.  Eyes: Pupils equal, round and reactive to light, extra ocular muscles intact, sclera clear, conjunctiva normal.  HENT: Normocephalic and atraumatic, oral pharynx with moist mucus membranes, upper and lower dentures. No goiter appreciated.   Respiratory: Clear to auscultation bilaterally, no crackles or wheezing.  Cardiovascular: Regular rate and rhythm, normal S1 and S2, and no murmur noted.  Carotids +2, no bruits. No edema. Palpable pulses to radial  DP and PT arteries.   GI: Normal bowel sounds, soft, non-distended, non-tender, no masses palpated, no hepatosplenomegaly.  Lymph/Hematologic: No cervical lymphadenopathy and no supraclavicular lymphadenopathy.  Genitourinary:  Deferred  Skin: Warm and dry.  No rashes on exposed skin   Musculoskeletal: Full ROM of neck. There is no redness, warmth, or swelling of the visible joints. Gross motor strength is normal.    Neurologic: Awake, alert, oriented to name, place and time. Cranial nerves II-XII are grossly intact. Gait is normal.   Neuropsychiatric: Calm, cooperative. Normal affect.       Prior Labs/Diagnostic Studies   All labs and imaging personally reviewed     EKG/ stress test - if " available please see in ROS above   Echo result w/o MOPS: Interpretation SummaryGlobal and regional left ventricular function is normal with an EF of 55-60%.Right ventricular function, chamber size, wall motion, and thickness arenormal.No hemodynamcially signifcant valvular abnormalities.Estimated mean RA pressure is mildly elevated at 8 mmHg.No pericardial effusion is present. There is no prior study for direct comparison.    The patient's records and results personally reviewed by this provider.     Outside records reviewed from: Care Everywhere    LAB/DIAGNOSTIC STUDIES TODAY:  A1c    Assessment    Erwin Aguilar is a 62 year old male seen as a PAC referral for risk assessment and optimization for anesthesia.    Plan/Recommendations  Pt will be optimized for the proposed procedure.  See below for details on the assessment, risk, and preoperative recommendations    NEUROLOGY  - No history of TIA, CVA or seizure  - History of intra-operative spinal cord injury 2009 complicated by cauda equina syndrome. [employer] spinal cord stimulator in place.    - Chronic Pain  On chronic opiates, morphine equivilant = 22.5 MME/Day. Please see PAC pharmacist note for details.     -Post Op delirium risk factors:  High co-morbid index    ENT  - No current airway concerns.  Will need to be reassessed day of surgery.  Mallampati: III  TM: > 3  - Upper and lower dentures    CARDIAC  - No history of CAD    - Afib, anticoagulated on Eliquis. Patient reports only having one episode a couple years ago, denies recurrence.  [id number] score:  2    - Hypertension  With history of allergies to multiple antihypertensives.  Patient is currently medically managed on doxazosin, propanolol, triamterene-HCTZ.  His blood pressures have remained uncontrolled despite this regimen.  He reports that his BP is even elevated at home ranging around 150/80.  He has stopped checking his blood pressures because this is stressing him out.  Patient was  discussed with Dr. Ojeda, he will communicate this with anesthesia providers DOS.     Blood pressure elevated at today's exam.  Recommended checking in the days leading up to surgery and continuing antihypertensive medications as prescribed.    - Hyperlipidemia  Well controlled on home regimen    - Patient reports a history of bradycardia, HR drops into the 40s while he is asleep. He would like his anesthesia team to be aware of this.     - METS (Metabolic Equivalents)  Patient CANNOT perform 4 METS exercise due to chronic pain. No cardiac symptoms.           Total Score: 1    Functional Capacity: Unable to complete 4 METS      RCRI-Low risk: Class 2 0.9% complication rate            Total Score: 1    RCRI: Diabetes        PULMONARY    ALISIA Medium Risk            Total Score: 3    ALISIA: Hypertension    ALISIA: Over 50 ys old    ALISIA: Male      - Asthma  Well controlled. Intermittent with as needed albuterol inhaler. Patient denies recent inhaler use or respiratory concerns today. Lungs clear to auscultation on exam.   - Tobacco History    History   Smoking Status    Former    Packs/day: 0.00    Years: 1.00    Types: Cigarettes    Start date: 10/13/1983    Quit date: 6/9/1984   Smokeless Tobacco    Never       GI  - GERD  History of GERD, resolved when patient stopped drinking alcohol almost 10 years ago.   - History of alcohol abuse/cirrhosis, sober since 2014. LFTs completed this morning within normal limits. Follows with GI annually.   - History of esophageal varices with GI bleed. No recurrence. Last EGD 2018 with normal esophagus. Planning for repeat EGD in the near future.  - Chronic nausea on phenergan    PONV Medium Risk  Total Score: 2           1 AN PONV: Patient is not a current smoker    1 AN PONV: Intended Post Op Opioids        /RENAL  - Baseline Creatinine  0.79    ENDOCRINE    - BMI: Estimated body mass index is 30.56 kg/m  as calculated from the following:    Height as of this encounter: 1.803 m (5'  "11\").    Weight as of this encounter: 99.4 kg (219 lb 2.2 oz).  Obesity (BMI >30)  - Diabetes  Hemoglobin A1C (%)   Date Value   05/01/2023 5.8 (H)   04/09/2021 6.0 (H)     Diabetes Type 2, insulin dependent. Hold morning oral hypoglycemic medications and short acting insulin DOS. Take 80% of last scheduled dose of long-acting insulin prior to procedure.  Recommend close monitoring of the patient's blood glucose levels throughout the perioperative period.    HEME  VTE Low Risk 0.5%            Total Score: 3    VTE: Greater than 59 yrs old    VTE: Male      - Coagulopathy secondary to Apixaban (Eliquis). Please see PAC pharmacist note for hold recommendations.   - Chronic anemia. Last hgb completed today was 14.5  Recommend perioperative use of blood conservation techniques intraoperatively and close monitoring for postoperative bleeding.  - History of blood transfusion, denies transfusion reaction or known antibodies.     MSK  - Carpal tunnel and cubital tunnel syndrome; surgery as scheduled above  - History of multiple orthopedic surgeries  - History of drop foot and achilles tendon rupture, right  - Uses a cane to walk, consider fall risk precautions.  - Recommend consideration for careful positioning to limit patient discomfort.      PSYCH  - Anxiety, depression; well controlled on current medication regimen    Different anesthesia methods/types have been discussed with the patient, but they are aware that the final plan will be decided by the assigned anesthesia provider on the date of service.  Patient was discussed with Dr. Ojeda    The patient is optimized for their procedure. AVS with information on surgery time/arrival time, meds and NPO status given by nursing staff. No further diagnostic testing indicated.      On the day of service:     Prep time: 5 minutes  Visit time: 15 minutes  Documentation time: 15 minutes  ------------------------------------------  Total time: 35 minutes      Miya Cummings " GENEVIEVE  Preoperative Assessment Center  [facility]  Phone: [phone]  Fax: [phone]

## 2023-08-10 NOTE — LETTER
8/10/2023         RE: Erwin Aguilar  [address]  [place] [address]        Dear Colleague,    Thank you for referring your patient, Erwin Aguilar, to the [facility] HEPATOLOG[facility] [place]. Please see a copy of my visit note below.    GI CLINIC VISIT    CC/REFERRING MD:  Referred Self  REASON FOR CONSULTATION:   Referred Self for   Chief Complaint   Patient presents with    RECHECK       ASSESSMENT/PLAN:    #Cirrhosis 2/2 Alcohol Use Disorder   #Portal Hypertensive Gastropathy  #Hx of GIB  MELD 3.0: 7 at 8/10/2023  7:10 AM  MELD-Na: 7 at 8/10/2023  7:10 AM  Calculated from:  Serum Creatinine: 0.79 mg/dL (Using min of 1 mg/dL) at 8/10/2023  7:10 AM  Serum Sodium: 141 mmol/L (Using max of 137 mmol/L) at 8/10/2023  7:10 AM  Total Bilirubin: 0.5 mg/dL (Using min of 1 mg/dL) at 8/10/2023  7:10 AM  Serum Albumin: 4.7 g/dL (Using max of 3.5 g/dL) at 8/10/2023  7:10 AM  INR(ratio): 1.11 at 8/10/2023  7:10 AM  Age at listing (hypothetical): 62 years  Sex: Male at 8/10/2023  7:10 AM    VS normal except high blood pressure in the clinic today(177/77). Has maintained abstinence from alcohol and there is currently low concern for relapse. Had presented with GIB initially and his last EGD was 8/2018 which showed portal hypertensive gastropathy. Currently taking Eliquis for his AF. His labs today in the clinic were reviewed and the liver function test was within normal limits. His platelet count today is 151 and the INR is 1.11 with low concern for any synthetic dysfunction of the liver. Currently low concern for HE and his sxs of nausea seem unlikely 2/2 to his liver dysfunction. He underwent last colonoscopy in 2016 and was supposed to repeat in 3 years.   Pt had some reservations regarding the EGD and colonoscopy today which were adequately addressed by Dr Leventhal and a referral was placed for the imaging.   - Future labs BMP, CBC, hepatic function tests, INR, AFP tumor marker ordered  - EGD,  Colonoscopy, USG abdomen complete and elastography ordered   - RTC in 1 year   - Maintain abstinence from alcohol  - Avoid NSAIDs for chronic pain     The pt was seen and the pt care was discussed with Dr Leventhal Gurmandeep Singh Sandhu  Internal Medicine Resident PGY2  [facility]         HPI    Mr Erwin Aguilar is 62 year old M with pmh of chronic pain syndrome, CTS, T2DM, lumbosacral radiculopathy, cirrhosis 2/2 alcohol use disorder c/b GI bleeding and portal hypertension who presents for annual evaluation visit.    He does not report any significant complains from his GI perspective. He had  azn achillis tendon rupture and lumber radiculopathy that has been causing him significant pain and disability. Report that he is due to undergo surgery for his carpel tunnel syndrome which was postponed recently.   States that his appetite is decreased because of his chronic nausea (He was told that he has gastroparesis in the past but has not undergone any diagnostic work up for that). He is currently taking medical marijuana for his chronic nausea. He has following low fat diet and is taking frequent meals. Does not report any episodes of blood in his vomit or stools. No concern for recent color changes of his stools and no concerns for recent diarrhea or constipation. Does not repot any lethargy, resting tremors or confusion concerning for HE.his sleep has been disturbed lately but he attributes it to his chronic pain.  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   He does get some edema on his  R LE which he thinks is likely 2/2 to his tendon rupture and is not a/w resting the leg for prolonged duration.   Patient denies any chest pain, SOB, exertional dyspnea/orthopnea/PND, dizziness or lightheadedness.        ROS:    10 point ROS neg other than the symptoms noted above in the HPI.    PREVIOUS ENDOSCOPY:  EGD  8/2018  Impression:                          - Normal esophagus.                        - Hematin (altered blood/coffee-ground-like material) in                        the stomach.                        - Normal examined duodenum.                        - Portal hypertensive gastropathy.                        - No specimens collected.     Colonoscopy 2016  Impression:                                - Stool in the entire examined colon.                        - One 4 mm polyp in the sigmoid colon. Resected and                        retrieved.                        - The distal rectum and anal verge are normal on                        retroflexion view         PERTINENT RELEVANT IMAGING OR LABS:    USG abdomen 2022  IMPRESSION:  1. Cirrhotic hepatic morphology. No focal hepatic lesion.  a. LI-RADS US Category: US-1 Negative: No US evidence of HCC  b. Recommend continued surveillance US.  2. Mild splenomegaly suggests portal hypertension.  3. Cholelithiasis.    ALLERGIES:     Allergies   Allergen Reactions    Levaquin Anaphylaxis and Rash     Swelling in lip and tongue felt thick    Lisinopril Anaphylaxis     Swollen tongue; inability to swallow; drooling; hives; swollen face, neck, angioedema    Acetaminophen      Hx of cirrhosis     Amlodipine      Swelling, hives, possible angioedema      Morphine      b/p dropped and arms went numb  Hypotension    Quinolones Hives    Spironolactone Unknown     Pt believes himself to be allergic to it; cannot find his old records of this.-[facility]     Bactrim [Sulfamethoxazole-Trimethoprim] Rash       PERTINENT MEDICATIONS:    Current Outpatient Medications:     ACE/ARB NOT PRESCRIBED, INTENTIONAL,, ACE & ARB not prescribed due to Allergy, Disp: , Rfl:     albuterol (PROAIR HFA/PROVENTIL HFA/VENTOLIN HFA) 108 (90 Base) MCG/ACT inhaler, INHALE 2 PUFFS BY MOUTH EVERY 6 HOURS AS NEEDED FOR SHORTNESS OF BREATH/DYSPNEA OR WHEEZING, Disp: 18 g, Rfl: 1    apixaban ANTICOAGULANT (ELIQUIS  ANTICOAGULANT) 5 MG tablet, Take 1 tablet (5 mg) by mouth 2 times daily, Disp: 180 tablet, Rfl: 1    baclofen (LIORESAL) 10 MG tablet, TAKE 2 TABLETS BY MOUTH THREE TIMES DAILY, Disp: 180 tablet, Rfl: 3    doxazosin (CARDURA) 8 MG tablet, TAKE 1 TABLET (8 MG) BY MOUTH AT BEDTIME, Disp: 90 tablet, Rfl: 1    gabapentin (NEURONTIN) 300 MG capsule, TAKE 1 CAPSULE BY MOUTH IN THE AFTERNOON AND 2 CAPSULES AT BEDTIME (Patient taking differently: TAKE 2 CAPSULE BY MOUTH IN THE MORNING AND 1 CAPSULES AT BEDTIME), Disp: 90 capsule, Rfl: 1    GLOBAL EASE INJECT PEN NEEDLES 32G X 4 MM miscellaneous, USE AS DIRECTED EVERY DAY, Disp: 100 each, Rfl: 1    insulin glargine (LANTUS PEN) 100 UNIT/ML pen, Inject 26 Units Subcutaneous At Bedtime, Disp: 30 mL, Rfl: 3    medical cannabis (Patient's own supply), See Admin Instructions (The purpose of this order is to document that the patient reports taking medical cannabis.  This is not a prescription, and is not used to certify that the patient has a qualifying medical condition.)  Flower - PRN, Disp: , Rfl:     oxyCODONE (ROXICODONE) 5 MG tablet, Take 1 tablet (5 mg) by mouth 3 times daily (visit with Dr. Wegener every three months) (Patient taking differently: Take 5 mg by mouth 3 times daily as needed for pain (visit with Dr. Wegener every three months)), Disp: 90 tablet, Rfl: 0    promethazine (PHENERGAN) 25 MG tablet, TAKE 1 TABLET(25 MG) BY MOUTH THREE TIMES DAILY AS NEEDED FOR NAUSEA, Disp: 90 tablet, Rfl: 4    propranolol (INDERAL) 40 MG tablet, Take 2 tablets (80 mg) by mouth 2 times daily, Disp: 360 tablet, Rfl: 3    rosuvastatin (CRESTOR) 20 MG tablet, Take 1 tablet (20 mg) by mouth daily, Disp: 90 tablet, Rfl: 3    sertraline (ZOLOFT) 100 MG tablet, TAKE 2 TABLETS BY MOUTH DAILY (Patient taking differently: Take 200 mg by mouth daily TAKE 2 TABLETS BY MOUTH DAILY), Disp: 180 tablet, Rfl: 3    triamterene-HCTZ (DYAZIDE) 37.5-25 MG capsule, Take 2 capsules by mouth every  morning (Patient taking differently: Take 1 capsule by mouth every morning), Disp: 180 capsule, Rfl: 3    PROBLEM LIST  Patient Active Problem List    Diagnosis Date Noted    Carpal tunnel syndrome of left wrist 07/13/2023     Priority: Medium    Cubital tunnel syndrome on left 06/05/2023     Priority: Medium     Added automatically from request for surgery [id number]      Left carpal tunnel syndrome 06/05/2023     Priority: Medium     Added automatically from request for surgery [id number]      Adjustment disorder with anxious mood 10/26/2021     Priority: Medium    Psychophysiological insomnia 10/26/2021     Priority: Medium    At risk for prolonged QT interval syndrome 10/26/2021     Priority: Medium    Paroxysmal atrial fibrillation (H) 09/22/2021     Priority: Medium    Transient ischemic attack (TIA) 09/07/2021     Priority: Medium    Acquired calcaneus deformity of right ankle 05/06/2021     Priority: Medium    H/O foot surgery 05/06/2021     Priority: Medium    Calcific Achilles tendinitis 05/06/2021     Priority: Medium    Contact with and (suspected) exposure to covid-19 01/25/2021     Priority: Medium    Right ankle pain 10/20/2020     Priority: Medium     Added automatically from request for surgery [id number]      Gastroparesis 10/08/2019     Priority: Medium    Alcoholism in remission (H) 10/08/2019     Priority: Medium    Osteoarthritis of lumbar spine, unspecified spinal osteoarthritis complication status 09/17/2018     Priority: Medium    S/P insertion of spinal cord stimulator 09/17/2018     Priority: Medium    Hematemesis with nausea 06/30/2018     Priority: Medium    Benign neoplasm of skin of trunk, except scrotum 01/12/2018     Priority: Medium    Type 2 diabetes mellitus with diabetic polyneuropathy, with long-term current use of insulin (H) 05/09/2017     Priority: Medium    Wound, open, buttock, right 04/20/2017     Priority: Medium    Type 2 diabetes mellitus without complication, with long-term  current use of insulin (H) 10/06/2016     Priority: Medium    Calculus of gallbladder without cholecystitis without obstruction 05/10/2016     Priority: Medium    Lumbosacral radiculopathy 02/10/2016     Priority: Medium     S/p SCS implant 2010- [employer]      Alcoholic cirrhosis of liver without ascites (H) 10/13/2015     Priority: Medium    Right knee pain 09/20/2015     Priority: Medium    Fall 07/11/2015     Priority: Medium    Closed fracture of right patella 07/11/2015     Priority: Medium     Diagnosis updated by automated process. Provider to review and confirm.      Open wound of right heel 12/23/2014     Priority: Medium    Moderate persistent asthma 09/04/2014     Priority: Medium    Ganglion cyst 06/04/2014     Priority: Medium    Dry skin dermatitis 06/04/2014     Priority: Medium    Superficial thrombophlebitis of arm 04/03/2014     Priority: Medium    Cellulitis of hand 04/03/2014     Priority: Medium    Skin infection 04/02/2014     Priority: Medium    Cirrhosis of liver (H) 04/01/2014     Priority: Medium     Not biopsy-proven as of 4/1/14.      Anemia due to blood loss, acute 04/01/2014     Priority: Medium    Edema of left lower extremity 03/31/2014     Priority: Medium    Muscle spasms of Upper extremity 01/20/2014     Priority: Medium    Atopic dermatitis 01/17/2014     Priority: Medium    Nausea without vomiting 11/27/2013     Priority: Medium     Problem list name updated by automated process. Provider to review      Cellulitis 11/01/2013     Priority: Medium    Wound infection 08/05/2013     Priority: Medium    Hypokalemia 11/06/2012     Priority: Medium    Diastasis recti 11/05/2012     Priority: Medium    Hernia 10/25/2012     Priority: Medium    Abnormal EMG 05/01/2012     Priority: Medium    Neuropathy in diabetes (H) 12/06/2011     Priority: Medium    Health Care Home 10/28/2011     Priority: Medium     EMERGENCY CARE PLAN  Presenting Problem Signs and Symptoms Treatment Plan     Questions or conerns during clinic hours    I will call the clinic directly [phone]     Questions or conerns outside clinic hours    I will call the 24 hour nurse line at [phone]    Patient needs to schedule an appointment    I will call the 24 hour scheduling team at [phone] or clinic directly    Same day treatment     I will call the clinic first, nurse line if after hours, urgent care and express care if needed                              DX V65.8 REPLACED WITH [id number] [facility] CARE HOME (04/08/2013)      Abnormal liver function tests 06/14/2011     Priority: Medium    GERD (gastroesophageal reflux disease) 06/13/2011     Priority: Medium    Chronic pain syndrome 03/14/2011     Priority: Medium     Failed back surgery L5-S1   August 2009  Post-op complications: right dropped foot, bowel and bladder involvement    Patient is followed by WEGENER, JOEL DANIEL IRWIN for ongoing prescription of pain medication.  All refills should be approved by this provider, or covering partner.    Medication(s): oxycodone.   Maximum quantity per month: see sig  Clinic visit frequency required: Q 3 months     Controlled substance agreement on file: Yes       Date(s):  March 1, 2016      Pain Clinic evaluation in the past:      Date/Location:  non-[facility], spinal stimulator,  [facility] x one 2015.     DIRE Total Score(s):  No flowsheet data found.    Last [facility] website verification: March 1, 2016     [email or web address]        Major depressive disorder, recurrent episode, mild (H) 12/13/2010     Priority: Medium    Hypertension goal BP (blood pressure) < 140/90 12/06/2010     Priority: Medium    Type 2 diabetes, HbA1c goal < 7% (H) 10/31/2010     Priority: Medium     Per provider        Hyperlipidemia LDL goal <100 10/31/2010     Priority: Medium     Per provider        Stress 09/30/2010     Priority: Medium    Physical deconditioning 09/30/2010     Priority: Medium    Neuropathic pain syndrome (non-herpetic)  09/30/2010     Priority: Medium    Neurogenic bowel 09/30/2010     Priority: Medium    Neurogenic bladder 09/30/2010     Priority: Medium    Gait abnormality 09/30/2010     Priority: Medium    Foot-drop 09/30/2010     Priority: Medium    CRPS (complex regional pain syndrome type I) 09/30/2010     Priority: Medium    Generalized muscle weakness 06/19/2009     Priority: Medium    Generalized anxiety disorder      Priority: Medium    Noninfectious gastroenteritis and colitis 07/27/2000     Priority: Medium     Formatting of this note might be different from the original. Other and unspecified noninfectious gastroenteritis and colitis(558.9)         PERTINENT PAST MEDICAL HISTORY:  Past Medical History:   Diagnosis Date    Actinic keratosis     aldara    Anxiety     Cancer (H)     squamous cell skin CA    Cauda equina spinal cord injury (H)     Chronic sinusitis 5-1-16    Depressive disorder     Diastasis recti     Esophageal reflux     Esophageal varices in cirrhosis (H) 4/1/2014    Hospitalized for UGI blee 3/28/14, endoscopy revealed bleeding varices.    Essential hypertension, benign     Intermittent asthma     Mild depression     Mixed hyperlipidemia     Nasal polyps 5-1-16    Osteoarthritis of lumbar spine, unspecified spinal osteoarthritis complication status     Other chronic pain     Paroxysmal atrial fibrillation (H) 9/22/2021    SCCA (squamous cell carcinoma) of skin     Seasonal allergic conjunctivitis     Type II or unspecified type diabetes mellitus without mention of complication, not stated as uncontrolled     Unspecified site of spinal cord injury without evidence of spinal bone injury     due to back surgery       PREVIOUS SURGERIES:  Past Surgical History:   Procedure Laterality Date    ABDOMEN SURGERY  2014    BACK SURGERY  August 2009    COLONOSCOPY N/A 5/12/2016    Procedure: COMBINED COLONOSCOPY, SINGLE OR MULTIPLE BIOPSY/POLYPECTOMY BY BIOPSY;  Surgeon: Ana Paula Urbina MD;   Location:  GI    ENDOSCOPY UPPER, COLONOSCOPY, COMBINED  10/19/2011    Procedure:COMBINED ENDOSCOPY UPPER, COLONOSCOPY; Upper Endoscopy, Colonoscopy with Polypectomy x4; Surgeon:MABAR RODRÍGUEZ; Location: OR    ENT SURGERY  1-2016    Ongoing since then    ESOPHAGOSCOPY, GASTROSCOPY, DUODENOSCOPY (EGD), COMBINED  3/28/2014    Procedure: COMBINED ESOPHAGOSCOPY, GASTROSCOPY, DUODENOSCOPY (EGD);  EGD, Gastric Biopsies, Esophageal Banding;  Surgeon: Reyna Tovar MD;  [facility] OR    ESOPHAGOSCOPY, GASTROSCOPY, DUODENOSCOPY (EGD), COMBINED  6/9/2014    Procedure: COMBINED ESOPHAGOSCOPY, GASTROSCOPY, DUODENOSCOPY (EGD);  Surgeon: Curtis Mendez MD;  Location:  GI    ESOPHAGOSCOPY, GASTROSCOPY, DUODENOSCOPY (EGD), COMBINED  7/24/2014    Procedure: COMBINED ESOPHAGOSCOPY, GASTROSCOPY, DUODENOSCOPY (EGD);  Surgeon: Gerard Samaniego MD;  Location:  OR    ESOPHAGOSCOPY, GASTROSCOPY, DUODENOSCOPY (EGD), COMBINED N/A 10/31/2014    Procedure: COMBINED ESOPHAGOSCOPY, GASTROSCOPY, DUODENOSCOPY (EGD);  Surgeon: Gerard Samaniego MD;  Location:  OR    ESOPHAGOSCOPY, GASTROSCOPY, DUODENOSCOPY (EGD), COMBINED N/A 5/12/2016    Procedure: COMBINED ESOPHAGOSCOPY, GASTROSCOPY, DUODENOSCOPY (EGD);  Surgeon: Ana Paula Urbina MD;  Location:  GI    ESOPHAGOSCOPY, GASTROSCOPY, DUODENOSCOPY (EGD), COMBINED N/A 8/2/2018    Procedure: COMBINED ESOPHAGOSCOPY, GASTROSCOPY, DUODENOSCOPY (EGD);  EGD;  Surgeon: Yu Wagner MD;  Location:  GI    HCL SQUAMOUS CELL CARCINOMA AG  10/07    left forearm    HERNIORRHAPHY UMBILICAL  11/8/2012    Procedure: HERNIORRHAPHY UMBILICAL;  Open Umbilical Hernia Repair With Mesh ;  Surgeon: Chase Nicholson MD;  Location: [facility] OR    INSERT STIMULATOR DORSAL COLUMN N/A 6/28/2018    Procedure: INSERT STIMULATOR DORSAL COLUMN;;  Surgeon: Elvis Sauceda MD;  Location: [facility] OR    neuro stimulator  2010    REMOVE GENERATOR STIMULATOR  (LOCATION) N/A 2018    Procedure: REMOVE GENERATOR STIMULATOR (LOCATION);  Spinal Cord Stimulator and IPG Explant and Re-Implant of SCS System (Leads and IPG);  Surgeon: Elvis Sauceda MD;  [facility] OR    REPAIR TENDON ACHILLES Right 2020    Procedure: Right achilles debridement and partial calcaneus excision;  Surgeon: Eh Sandoval MD;  [facility]    SURGICAL HISTORY OF -       abcess tooth    SURGICAL HISTORY OF -       L4-5 laminectomy, cauda equina syndrome    SURGICAL HISTORY OF -   +    herniated disk repair    TONSILLECTOMY  10 1964    TRANSPOSITION ULNAR NERVE (ELBOW)      right    ZZC APPENDECTOMY  1974       SOCIAL HISTORY:  Social History     Socioeconomic History    Marital status: Single     Spouse name: Not on file    Number of children: 0    Years of education: Not on file    Highest education level: Not on file   Occupational History    Occupation: sales     Employer: UNEMPLOYED   Tobacco Use    Smoking status: Former     Packs/day: 0.00     Years: 1.00     Pack years: 0.00     Types: Cigarettes     Start date: 10/13/1983     Quit date: 1984     Years since quittin.1     Passive exposure: Past    Smokeless tobacco: Never   Vaping Use    Vaping Use: Never used   Substance and Sexual Activity    Alcohol use: Not Currently     Comment: a pint of alcohol / day - last drink was 3/28/14    Drug use: Yes     Frequency: 2.0 times per week     Types: Marijuana     Comment: medical marijuana - vape daily    Sexual activity: Not Currently     Partners: Female     Birth control/protection: Abstinence   Other Topics Concern    Parent/sibling w/ CABG, MI or angioplasty before 65F 55M? No   Social History Narrative    Not on file     Social Determinants of Health     Financial Resource Strain: Not on file   Food Insecurity: Not on file   Transportation Needs: No Transportation Needs (2020)    PRAPARE - Transportation     Lack of  Transportation (Medical): No     Lack of Transportation (Non-Medical): No   Physical Activity: Inactive (12/31/2020)    Exercise Vital Sign     Days of Exercise per Week: 0 days     Minutes of Exercise per Session: 0 min   Stress: Not on file   Social Connections: Unknown (12/31/2020)    Social Connection and Isolation Panel [NHANES]     Frequency of Communication with Friends and Family: Not on file     Frequency of Social Gatherings with Friends and Family: Not on file     Attends Worship Services: Not on file     Active Member of Clubs or Organizations: Not on file     Attends Club or Organization Meetings: Not on file     Marital Status:    Intimate Partner Violence: Not on file   Housing Stability: Not on file       FAMILY HISTORY:  Family History   Problem Relation Age of Onset    Cancer Mother         lung    Breast Cancer Mother     Other Cancer Mother     Cancer Father         esophogeal, GERD    Snoring Father     Diabetes Brother         amputation, Type 1    Diabetes Brother     Diabetes Brother     Cancer - colorectal No family hx of     Glaucoma No family hx of     Macular Degeneration No family hx of     Anesthesia Reaction No family hx of     Venous thrombosis No family hx of        Past/family/social history reviewed and no changes    PHYSICAL EXAMINATION:  Constitutional: aaox3, cooperative, pleasant, not dyspneic/diaphoretic, no acute distress  Vitals reviewed: BP (!) 177/77 (BP Location: Right arm, Patient Position: Sitting, Cuff Size: Adult Large)   Pulse 64   Temp 97.6  F (36.4  C) (Oral)   Wt 99.4 kg (219 lb 3.2 oz)   SpO2 98%   BMI 30.57 kg/m    Wt:   Wt Readings from Last 2 Encounters:   08/10/23 99.4 kg (219 lb 3.2 oz)   07/03/23 97.5 kg (215 lb)      Eyes: Sclera anicteric/injected  Ears/nose/mouth/throat: Normal oropharynx without ulcers or exudate, mucus membranes moist, hearing intact  Neck: supple, thyroid normal size  CV: No edema  Respiratory: Unlabored  breathing  Lymph: No axillary, submandibular, supraclavicular or inguinal lymphadenopathy  Abd: Nondistended, +bs, no hepatosplenomegaly, nontender, no peritoneal signs  Skin: warm, perfused, no jaundice  Psych: Normal affect  MSK: Slight limp 2/2 to his tendon rupture on R LE      Attestation signed by Leventhal, Thomas Michael, MD at 8/10/2023 10:53 AM:  Staff Physician Attestation  I, Thomas M. Leventhal, M.D., discussed and examined this patient with the fellow and agree with the fellow s findings and plan of care as documented in the fellow s note.  I personally reviewed vital signs, medications, labs, and imaging.       62y M with PMHx of AUD (now sober ~ 10 years) with cirrhosis complicated by bleeding esophageal varices, who presents in follow up  - Discussed need to further risk stratify for advanced fibrosis      - US elastography ordered  - Issues with N/V, as well as overdue for variceal screening   - Ordered EGD  - Overdue for colonoscopy; due 2019   - Ordered colonoscopy  - Labs ordered for Q 6 months  - Has multiple chronic pain issues (LBP, achilles, DJD) and works with his primary in management  - Reports limits on transport as neither he nor wife drive    Thomas M. Leventhal, M.D.  Associate Professor of Medicine  Advanced & Transplant Hepatology  [facility]  Date of Service: 08/10/23      Again, thank you for allowing me to participate in the care of your patient.        Sincerely,        Thomas M. Leventhal, MD

## 2023-08-10 NOTE — PATIENT INSTRUCTIONS
Preparing for Your Surgery      Name:  Erwin Aguilar   MRN:  [id number]   :  1960   Today's Date:  8/10/2023         Arriving for surgery:  Surgery date:  23  Arrival time:  11:20AM    Restrictions due to COVID 19:    Please maintain social distance.  Masking is optional.      parking is available for anyone with mobility limitations or disabilities. (Monday- Friday 7 am- 5 pm)    Please come to:    [facility]  [address] [place] [id number]    Please check in on the 5th floor at the Ambulatory Surgery Center.      What can I eat or drink?    -  You may eat and drink normally until 8 hours prior to arrival  time. (Until 23, 3:20AM)  -  You may have clear liquids until 2 hours prior to arrival  time. (Until 23, 9:20AM)    Examples of clear liquids:  Water  Clear broth  Juices (apple, white grape, white cranberry  and cider) without pulp  Noncarbonated, powder based beverages  (lemonade and Ronnell-Aid)  Sodas (Sprite, 7-Up, ginger ale and seltzer)  Coffee or tea (without milk or cream)  Gatorade      Which medicines can I take?    Hold Apixaban(Eliquis) for 1 day prior to surgery, last dose 23.   Hold Aspirin for 7 days before surgery.   Hold Multivitamins for 7 days before surgery.  Hold Supplements for 7 days before surgery.  Hold Ibuprofen (Advil, Motrin) for 1 day before surgery--unless otherwise directed by surgeon.  Hold Naproxen (Aleve) for 4 days before surgery.    No alcohol or cannabis products for 24 hours prior to procedure.      -  PLEASE TAKE the following medications the day of surgery:     Please take 21 Units of Lantus Insulin the evening before surgery.    Albuterol Inhaler as needed and bring the day of surgery    Baclofen    Gabapentin(Neurontin)    Oxycodone(Roxicodone) as needed    Promethazine(Phenergan) as needed    Propranolol(Inderal)    Rosuvastatin(Crestor)    Sertraline(Zoloft)    Triamterene-Hydrochlorothiazide    How  do I prepare myself?  - Please take 2 showers (one the night prior to surgery and one the morning of surgery) using Scrubcare or Hibiclens soap.    Use this soap only from the neck to your toes.     Leave the soap on your skin for one minute--then rinse thoroughly.      You may use your own shampoo and conditioner. No other hair products.   - Please remove all jewelry and body piercings.  - No lotions, deodorants or fragrance.   - Bring your ID and insurance card.    -If you have a Deep Brain Stimulator, a Spinal Cord Stimulator, or any implanted Neuro Device, you must bring the remote to the Surgery Center.         ALL PATIENTS ARE REQUIRED TO HAVE A RESPONSIBLE ADULT TO DRIVE AND BE IN ATTENDANCE WITH THEM FOR 24 HOURS FOLLOWING SURGERY.     Covid testing policy as of 12/06/2022  Your surgeon will notify and schedule you for a COVID test if one is needed before surgery--please direct any questions or COVID symptoms to your surgeon      Questions or Concerns:    -For questions regarding the day of surgery, please contact the Ambulatory Surgery Center at [phone].    -If you have health changes between today and your surgery, please contact your surgeon.     - For questions after surgery, please contact your surgeon's office.

## 2023-08-11 ENCOUNTER — TELEPHONE (OUTPATIENT)
Dept: GASTROENTEROLOGY | Facility: CLINIC | Age: 63
End: 2023-08-11
Payer: MEDICARE

## 2023-08-11 ENCOUNTER — MYC MEDICAL ADVICE (OUTPATIENT)
Dept: FAMILY MEDICINE | Facility: CLINIC | Age: 63
End: 2023-08-11
Payer: MEDICARE

## 2023-08-11 DIAGNOSIS — E11.42 TYPE 2 DIABETES MELLITUS WITH DIABETIC POLYNEUROPATHY, WITH LONG-TERM CURRENT USE OF INSULIN (H): Primary | ICD-10-CM

## 2023-08-11 DIAGNOSIS — Z79.4 TYPE 2 DIABETES MELLITUS WITH DIABETIC POLYNEUROPATHY, WITH LONG-TERM CURRENT USE OF INSULIN (H): Primary | ICD-10-CM

## 2023-08-11 NOTE — TELEPHONE ENCOUNTER
JW,  Please see below [employer] message and advise.  Pended order if approved.  Thanks,  Roslyn MORFIN RN

## 2023-08-11 NOTE — TELEPHONE ENCOUNTER
"Endoscopy Scheduling Screen    Have you had a positive Covid test in the last 14 days?  No    Are you active on MyChart?   Yes    What insurance is in the chart?  Other:  MEDICARE    Ordering/Referring Provider: Leventhal, Thomas Michael, MD     (If ordering provider performs procedure, schedule with ordering provider unless otherwise instructed. )    BMI: Estimated body mass index is 30.56 kg/m  as calculated from the following:    Height as of 8/10/23: 1.803 m (5' 11\").    Weight as of 8/10/23: 99.4 kg (219 lb 2.2 oz).     Sedation Ordered  MAC/deep sedation.   BMI<= 45 45 < BMI <= 48 48 < BMI < = 50  BMI > 50   No Restrictions No [facility]  No [facility]  [facility] with exceptions Hospital Only OR Only       Do you have a history of malignant hyperthermia or adverse reaction to anesthesia?  No    (Females) Are you currently pregnant?   No     Have you been diagnosed or told you have pulmonary hypertension?   No    Do you have an LVAD?  No    Have you been told you have moderate to severe sleep apnea?  No    Have you been told you have COPD, asthma, or any other lung disease?  Yes     What breathing problems do you have?  Asthma     Do you use home oxygen?  No    Have your breathing problems required an ED visit or hospitalization in the last year?  No    Do you have any heart conditions?  No     Have you ever had or are you awaiting a heart or lung transplant?   No    Have you had a stroke or transient ischemic attack (TIA aka \"mini stroke\" in the last 6 months?   No    Have you been diagnosed with or been told you have cirrhosis of the liver?   Yes (RN Review required for scheduling unless scheduling in Hospital.)    Are you currently on dialysis?   No    Do you need assistance transferring?   No    BMI: Estimated body mass index is 30.56 kg/m  as calculated from the following:    Height as of 8/10/23: 1.803 m (5' 11\").    Weight as of 8/10/23: 99.4 kg (219 lb 2.2 oz).     Is patients BMI > 40 and scheduling location " [facility]?  No    Do you take the medication Phentermine, Ozempic or Wegovy?  No    Do you take the medication Naltrexone?  No    Do you take blood thinners?  Yes     Are you taking Effient/Prasugrel?  No, you must contact your prescribing provider for direction on holding or bridging with a different medication.      Prep   Are you currently on dialysis or do you have chronic kidney disease?  No    Do you have a diagnosis of diabetes?  Yes (Golytely Prep)    Do you have a diagnosis of cystic fibrosis (CF)?  No    On a regular basis do you go 3 -5 days between bowel movements?  No    BMI > 40?  No    Preferred Pharmacy:      [facility]/pharmacy #[id number] - [place], [place] - [address]  [address] [place] [address]  Phone: [phone] Fax: [phone]      Final Scheduling Details   Colonoscopy prep sent?  Standard Golytely    Procedure scheduled  Colonoscopy / Upper endoscopy (EGD)    Surgeon:  JUANIS    Date of procedure:  12/28/23    Schedule PAC:   NO    Location  [facility]    Sedation   MAC    Patient Reminders:   You will receive a call from a Nurse to review instructions and health history.  This assessment must be completed prior to your procedure.  Failure to complete the Nurse assessment may result in the procedure being cancelled.      On the day of your procedure, please designate an adult(s) who can drive you home stay with you for the next 24 hours. The medicines used in the exam will make you sleepy. You will not be able to drive.      You cannot take public transportation, ride share services, or non-medical taxi service without a responsible caregiver.  Medical transport services are allowed with the requirement that a responsible caregiver will receive you at your destination.  We require that drivers and caregivers are confirmed prior to your procedure.      ADDITIONAL NOTES: Patient declined services stating he is not interested in scheduling but was willing to complete screening questions. States he  will call to schedule when ready. CTL with ordering provider.

## 2023-08-12 ENCOUNTER — MYC MEDICAL ADVICE (OUTPATIENT)
Dept: FAMILY MEDICINE | Facility: CLINIC | Age: 63
End: 2023-08-12
Payer: MEDICARE

## 2023-08-12 DIAGNOSIS — G89.29 CHRONIC PAIN OF RIGHT ANKLE: ICD-10-CM

## 2023-08-12 DIAGNOSIS — G89.4 CHRONIC PAIN SYNDROME: ICD-10-CM

## 2023-08-12 DIAGNOSIS — M65.28 CALCIFIC ACHILLES TENDINITIS: ICD-10-CM

## 2023-08-12 DIAGNOSIS — M79.671 INTRACTABLE RIGHT HEEL PAIN: ICD-10-CM

## 2023-08-12 DIAGNOSIS — Z79.4 TYPE 2 DIABETES MELLITUS WITH DIABETIC POLYNEUROPATHY, WITH LONG-TERM CURRENT USE OF INSULIN (H): ICD-10-CM

## 2023-08-12 DIAGNOSIS — M21.6X1 ACQUIRED CALCANEUS DEFORMITY OF RIGHT ANKLE: ICD-10-CM

## 2023-08-12 DIAGNOSIS — S86.011A RUPTURE OF RIGHT ACHILLES TENDON, INITIAL ENCOUNTER: ICD-10-CM

## 2023-08-12 DIAGNOSIS — E11.42 TYPE 2 DIABETES MELLITUS WITH DIABETIC POLYNEUROPATHY, WITH LONG-TERM CURRENT USE OF INSULIN (H): ICD-10-CM

## 2023-08-12 DIAGNOSIS — M25.571 CHRONIC PAIN OF RIGHT ANKLE: ICD-10-CM

## 2023-08-12 DIAGNOSIS — Z98.890 H/O FOOT SURGERY: ICD-10-CM

## 2023-08-14 RX ORDER — OXYCODONE HYDROCHLORIDE 5 MG/1
5 TABLET ORAL 3 TIMES DAILY PRN
Qty: 90 TABLET | Refills: 0 | Status: SHIPPED | OUTPATIENT
Start: 2023-08-14 | End: 2023-09-14

## 2023-08-15 DIAGNOSIS — E11.42 TYPE 2 DIABETES MELLITUS WITH DIABETIC POLYNEUROPATHY, WITH LONG-TERM CURRENT USE OF INSULIN (H): ICD-10-CM

## 2023-08-15 DIAGNOSIS — Z79.4 TYPE 2 DIABETES MELLITUS WITH DIABETIC POLYNEUROPATHY, WITH LONG-TERM CURRENT USE OF INSULIN (H): ICD-10-CM

## 2023-08-18 ENCOUNTER — VIRTUAL VISIT (OUTPATIENT)
Dept: FAMILY MEDICINE | Facility: CLINIC | Age: 63
End: 2023-08-18
Payer: MEDICARE

## 2023-08-18 DIAGNOSIS — I10 HYPERTENSION GOAL BP (BLOOD PRESSURE) < 130/80: Primary | ICD-10-CM

## 2023-08-18 DIAGNOSIS — M79.671 INTRACTABLE RIGHT HEEL PAIN: ICD-10-CM

## 2023-08-18 DIAGNOSIS — M21.6X1 ACQUIRED CALCANEUS DEFORMITY OF RIGHT ANKLE: ICD-10-CM

## 2023-08-18 DIAGNOSIS — M25.571 CHRONIC PAIN OF RIGHT ANKLE: ICD-10-CM

## 2023-08-18 DIAGNOSIS — G89.29 CHRONIC PAIN OF RIGHT ANKLE: ICD-10-CM

## 2023-08-18 DIAGNOSIS — S86.011A RUPTURE OF RIGHT ACHILLES TENDON, INITIAL ENCOUNTER: ICD-10-CM

## 2023-08-18 DIAGNOSIS — M65.28 CALCIFIC ACHILLES TENDINITIS: ICD-10-CM

## 2023-08-18 DIAGNOSIS — G89.4 CHRONIC PAIN SYNDROME: ICD-10-CM

## 2023-08-18 DIAGNOSIS — Z98.890 H/O FOOT SURGERY: ICD-10-CM

## 2023-08-18 PROCEDURE — 99214 OFFICE O/P EST MOD 30 MIN: CPT | Mod: VID | Performed by: FAMILY MEDICINE

## 2023-08-18 NOTE — PROGRESS NOTES
"Erwin is a 62 year old who is being evaluated via a billable video visit.      How would you like to obtain your AVS? MyChart  If the video visit is dropped, the invitation should be resent by:   Will anyone else be joining your video visit? No          Assessment & Plan   Chronic foot pain/oxycodone:  trying to avoid increasing dose/opiod induced hyperalgesia so long-term wanting to decrease/wean off.     Has upcoming cubital tunnel surgery. Staying active put 500 miles on electric bike last month despite foot pain.     Not using oxy three times daily. Has upcoming pain management appointment.     Will let me know after the surgery when getting to end of the oxycodone will let me know how much feels able to reduce for next prescription.     Elevated blood pressue - recently had doxazosin started but not sure if helping (via cardiology team) . I will do chart review and get back to him with suggestions potentially.     No suspicious activity on [place] rx monitoring database   Chronic pain syndrome      H/O foot surgery      Intractable right heel pain      Rupture of right Achilles tendon, initial encounter      Acquired calcaneus deformity of right ankle      Chronic pain of right ankle      Calcific Achilles tendinitis      Hypertension goal BP (blood pressure) < 130/80                 BMI:   Estimated body mass index is 30.56 kg/m  as calculated from the following:    Height as of 8/10/23: 1.803 m (5' 11\").    Weight as of 8/10/23: 99.4 kg (219 lb 2.2 oz).           Joel Daniel Wegener, MD  [facility]    Subjective   Erwin is a 62 year old, presenting for the following health issues:  No chief complaint on file.      History of Present Illness       Hypertension: He presents for follow up of hypertension.  He does not check blood pressure  regularly outside of the clinic. Outside blood pressures have been over 140/90. He follows a low salt diet.     Reason for visit:  Opioid screeningHe consumes " 0 sweetened beverage(s) daily. He exercises with enough effort to increase his heart rate 7 days per week.   He is taking medications regularly.               Review of Systems         Objective           Vitals:  No vitals were obtained today due to virtual visit.    Physical Exam   GENERAL: Healthy, alert and no distress  EYES: Eyes grossly normal to inspection.  No discharge or erythema, or obvious scleral/conjunctival abnormalities.  RESP: No audible wheeze, cough, or visible cyanosis.  No visible retractions or increased work of breathing.    SKIN: Visible skin clear. No significant rash, abnormal pigmentation or lesions.  NEURO: Cranial nerves grossly intact.  Mentation and speech appropriate for age.  PSYCH: Mentation appears normal, affect normal/bright, judgement and insight intact, normal speech and appearance well-groomed.                Video-Visit Details    Type of service:  Video Visit   Video Start Time:  3:13  Video End Time:3:33 PM    Originating Location (pt. Location): Home    Distant Location (provider location):  On-site  Platform used for Video Visit: Kameron

## 2023-08-18 NOTE — PATIENT INSTRUCTIONS
Chronic foot pain/oxycodone:  trying to avoid increasing dose/opiod induced hyperalgesia so long-term wanting to decrease/wean off.     Has upcoming cubital tunnel surgery. Staying active put 500 miles on electric bike last month despite foot pain.     Not using oxy three times daily. Has upcoming pain management appointment.     Will let me know after the surgery when getting to end of the oxycodone will let me know how much feels able to reduce for next prescription.     Elevated blood pressue - recently had doxazosin started but not sure if helping (via cardiology team) . I will do chart review and get back to him with suggestions potentially.     Has follow up scheduled for January already

## 2023-08-23 ENCOUNTER — ANCILLARY PROCEDURE (OUTPATIENT)
Dept: ULTRASOUND IMAGING | Facility: CLINIC | Age: 63
End: 2023-08-23
Attending: INTERNAL MEDICINE
Payer: MEDICARE

## 2023-08-23 DIAGNOSIS — K70.30 ALCOHOLIC CIRRHOSIS OF LIVER WITHOUT ASCITES (H): ICD-10-CM

## 2023-08-23 PROCEDURE — 76981 USE PARENCHYMA: CPT | Performed by: RADIOLOGY

## 2023-08-28 ENCOUNTER — TELEPHONE (OUTPATIENT)
Dept: FAMILY MEDICINE | Facility: CLINIC | Age: 63
End: 2023-08-28

## 2023-08-28 DIAGNOSIS — Z79.4 TYPE 2 DIABETES MELLITUS WITH DIABETIC POLYNEUROPATHY, WITH LONG-TERM CURRENT USE OF INSULIN (H): ICD-10-CM

## 2023-08-28 DIAGNOSIS — E11.42 TYPE 2 DIABETES MELLITUS WITH DIABETIC POLYNEUROPATHY, WITH LONG-TERM CURRENT USE OF INSULIN (H): ICD-10-CM

## 2023-08-28 NOTE — TELEPHONE ENCOUNTER
Prior Authorization Retail Medication Request    Medication/Dose: Continuous Blood Gluc  (FREESTYLE CACHORRO 2 READER) GASTON 1 each 0 8/15/2023  No  Sig: USE TO READ BLOOD SUGARS AS PER 'S INSTRUCTIONS.      ICD code (if different than what is on RX):  E11.42, Z79.4  Previously Tried and Failed:  Unknown  Rationale:  Unknown    Insurance Name:  Medicare  Insurance ID:  [id number]      Pharmacy Information (if different than what is on RX)  Name:  [facility]/PHARMACY #[id number] - [place], [place] - [address] [place] [address]    Phone:  [phone]

## 2023-08-30 ENCOUNTER — TELEPHONE (OUTPATIENT)
Dept: GASTROENTEROLOGY | Facility: CLINIC | Age: 63
End: 2023-08-30

## 2023-08-30 NOTE — TELEPHONE ENCOUNTER
Called [employer] and they stated they have no auth request on file.  Performed PA over the phone.       Clinical intake team - Administration code [id number] - notification auth #[id number] is valid from 05/08/20-08/05/20    Medication intake team - [employer]  - Form was completed and faxed 04/16.  Will refax today.  Could not complete over the phone, 30 wait time.  Will check back if no response received in 72 hours.         PRIOR AUTHORIZATION DENIED    Medication: Continuous Blood Gluc  (FREESTYLE CACHORRO 2 READER) GASTON    Denial Date: 8/30/2023    Denial Rational:  Medicare Part D Insurance does not cover diabetic supplies. This will have to be billed through medical benefits (Medicare Part B).  This may be covered under Medicare Part A or Part B. Clinic will need to fill out the CMN form from the Pharmacy.  Patient can call 1-800-MEDICARE for more information. Central PA team does not process medical benefit PA's.                Appeal Information:  N/A

## 2023-08-30 NOTE — TELEPHONE ENCOUNTER
Central Prior Authorization Team   Phone: [phone]    PA Initiation    Medication: Continuous Blood Gluc  (FREESTYLE CACHORRO 2 READER) GASTON  Insurance Company: [employer] - Phone [phone] Fax [phone]  Pharmacy Filling the Rx: [facility]/PHARMACY #[id number] - [place], [place] - [address]  Filling Pharmacy Phone: [phone]  Filling Pharmacy Fax:    Start Date: 8/30/2023      Manually faxed in PA request.

## 2023-08-31 ENCOUNTER — ANESTHESIA (OUTPATIENT)
Dept: SURGERY | Facility: AMBULATORY SURGERY CENTER | Age: 63
End: 2023-08-31
Payer: MEDICARE

## 2023-08-31 ENCOUNTER — HOSPITAL ENCOUNTER (OUTPATIENT)
Facility: AMBULATORY SURGERY CENTER | Age: 63
Discharge: HOME | End: 2023-08-31
Attending: STUDENT IN AN ORGANIZED HEALTH CARE EDUCATION/TRAINING PROGRAM | Admitting: STUDENT IN AN ORGANIZED HEALTH CARE EDUCATION/TRAINING PROGRAM
Payer: MEDICARE

## 2023-08-31 VITALS
BODY MASS INDEX: 34.37 KG/M2 | RESPIRATION RATE: 14 BRPM | WEIGHT: 219 LBS | HEART RATE: 52 BPM | OXYGEN SATURATION: 96 % | SYSTOLIC BLOOD PRESSURE: 124 MMHG | TEMPERATURE: 97.3 F | DIASTOLIC BLOOD PRESSURE: 60 MMHG | HEIGHT: 67 IN

## 2023-08-31 DIAGNOSIS — G56.02 CARPAL TUNNEL SYNDROME OF LEFT WRIST: Primary | ICD-10-CM

## 2023-08-31 DIAGNOSIS — I10 HYPERTENSION GOAL BP (BLOOD PRESSURE) < 130/80: ICD-10-CM

## 2023-08-31 DIAGNOSIS — G56.22 CUBITAL TUNNEL SYNDROME ON LEFT: ICD-10-CM

## 2023-08-31 DIAGNOSIS — G56.02 LEFT CARPAL TUNNEL SYNDROME: ICD-10-CM

## 2023-08-31 LAB — GLUCOSE BLDC GLUCOMTR-MCNC: 116 MG/DL (ref 70–99)

## 2023-08-31 PROCEDURE — 26055 INCISE FINGER TENDON SHEATH: CPT | Mod: F5

## 2023-08-31 PROCEDURE — 64721 CARPAL TUNNEL SURGERY: CPT | Mod: RT

## 2023-08-31 PROCEDURE — 999N000128 HC STATISTIC PERIPHERAL IV START W/O US GUIDANCE

## 2023-08-31 PROCEDURE — 82962 GLUCOSE BLOOD TEST: CPT | Performed by: PATHOLOGY

## 2023-08-31 PROCEDURE — 64718 REVISE ULNAR NERVE AT ELBOW: CPT | Mod: RT

## 2023-08-31 RX ORDER — OXYCODONE HYDROCHLORIDE 5 MG/1
5 TABLET ORAL EVERY 6 HOURS PRN
Qty: 8 TABLET | Refills: 0 | Status: SHIPPED | OUTPATIENT
Start: 2023-08-31 | End: 2023-09-20

## 2023-08-31 RX ORDER — PROPOFOL 10 MG/ML
INJECTION, EMULSION INTRAVENOUS CONTINUOUS PRN
Status: DISCONTINUED | OUTPATIENT
Start: 2023-08-31 | End: 2023-08-31

## 2023-08-31 RX ORDER — FENTANYL CITRATE 50 UG/ML
INJECTION, SOLUTION INTRAMUSCULAR; INTRAVENOUS PRN
Status: DISCONTINUED | OUTPATIENT
Start: 2023-08-31 | End: 2023-08-31

## 2023-08-31 RX ORDER — EPHEDRINE SULFATE 50 MG/ML
INJECTION, SOLUTION INTRAMUSCULAR; INTRAVENOUS; SUBCUTANEOUS PRN
Status: DISCONTINUED | OUTPATIENT
Start: 2023-08-31 | End: 2023-08-31

## 2023-08-31 RX ORDER — OXYCODONE HYDROCHLORIDE 5 MG/1
5 TABLET ORAL
Status: DISCONTINUED | OUTPATIENT
Start: 2023-08-31 | End: 2023-09-01 | Stop reason: HOSPADM

## 2023-08-31 RX ORDER — LIDOCAINE 40 MG/G
CREAM TOPICAL
Status: DISCONTINUED | OUTPATIENT
Start: 2023-08-31 | End: 2023-08-31 | Stop reason: HOSPADM

## 2023-08-31 RX ORDER — ONDANSETRON 4 MG/1
4 TABLET, ORALLY DISINTEGRATING ORAL EVERY 30 MIN PRN
Status: DISCONTINUED | OUTPATIENT
Start: 2023-08-31 | End: 2023-09-01 | Stop reason: HOSPADM

## 2023-08-31 RX ORDER — LIDOCAINE HYDROCHLORIDE AND EPINEPHRINE 10; 10 MG/ML; UG/ML
INJECTION, SOLUTION INFILTRATION; PERINEURAL PRN
Status: DISCONTINUED | OUTPATIENT
Start: 2023-08-31 | End: 2023-08-31 | Stop reason: HOSPADM

## 2023-08-31 RX ORDER — NALOXONE HYDROCHLORIDE 0.4 MG/ML
0.4 INJECTION, SOLUTION INTRAMUSCULAR; INTRAVENOUS; SUBCUTANEOUS
Status: DISCONTINUED | OUTPATIENT
Start: 2023-08-31 | End: 2023-08-31 | Stop reason: HOSPADM

## 2023-08-31 RX ORDER — BUPIVACAINE HYDROCHLORIDE 5 MG/ML
INJECTION, SOLUTION EPIDURAL; INTRACAUDAL
Status: COMPLETED | OUTPATIENT
Start: 2023-08-31 | End: 2023-08-31

## 2023-08-31 RX ORDER — FLUMAZENIL 0.1 MG/ML
0.2 INJECTION, SOLUTION INTRAVENOUS
Status: DISCONTINUED | OUTPATIENT
Start: 2023-08-31 | End: 2023-08-31 | Stop reason: HOSPADM

## 2023-08-31 RX ORDER — FENTANYL CITRATE 50 UG/ML
50 INJECTION, SOLUTION INTRAMUSCULAR; INTRAVENOUS
Status: DISCONTINUED | OUTPATIENT
Start: 2023-08-31 | End: 2023-09-01 | Stop reason: HOSPADM

## 2023-08-31 RX ORDER — NALOXONE HYDROCHLORIDE 0.4 MG/ML
0.2 INJECTION, SOLUTION INTRAMUSCULAR; INTRAVENOUS; SUBCUTANEOUS
Status: DISCONTINUED | OUTPATIENT
Start: 2023-08-31 | End: 2023-08-31 | Stop reason: HOSPADM

## 2023-08-31 RX ORDER — LIDOCAINE HYDROCHLORIDE 20 MG/ML
INJECTION, SOLUTION INFILTRATION; PERINEURAL PRN
Status: DISCONTINUED | OUTPATIENT
Start: 2023-08-31 | End: 2023-08-31

## 2023-08-31 RX ORDER — ONDANSETRON 2 MG/ML
4 INJECTION INTRAMUSCULAR; INTRAVENOUS EVERY 30 MIN PRN
Status: DISCONTINUED | OUTPATIENT
Start: 2023-08-31 | End: 2023-09-01 | Stop reason: HOSPADM

## 2023-08-31 RX ORDER — PROPOFOL 10 MG/ML
INJECTION, EMULSION INTRAVENOUS PRN
Status: DISCONTINUED | OUTPATIENT
Start: 2023-08-31 | End: 2023-08-31

## 2023-08-31 RX ORDER — SODIUM CHLORIDE, SODIUM LACTATE, POTASSIUM CHLORIDE, CALCIUM CHLORIDE 600; 310; 30; 20 MG/100ML; MG/100ML; MG/100ML; MG/100ML
INJECTION, SOLUTION INTRAVENOUS CONTINUOUS
Status: DISCONTINUED | OUTPATIENT
Start: 2023-08-31 | End: 2023-08-31 | Stop reason: HOSPADM

## 2023-08-31 RX ORDER — ACETAMINOPHEN 325 MG/1
975 TABLET ORAL ONCE
Status: DISCONTINUED | OUTPATIENT
Start: 2023-08-31 | End: 2023-09-01 | Stop reason: HOSPADM

## 2023-08-31 RX ORDER — ACETAMINOPHEN 325 MG/1
975 TABLET ORAL ONCE
Status: COMPLETED | OUTPATIENT
Start: 2023-08-31 | End: 2023-08-31

## 2023-08-31 RX ORDER — OXYCODONE HYDROCHLORIDE 5 MG/1
10 TABLET ORAL
Status: DISCONTINUED | OUTPATIENT
Start: 2023-08-31 | End: 2023-09-01 | Stop reason: HOSPADM

## 2023-08-31 RX ORDER — FENTANYL CITRATE 50 UG/ML
25-50 INJECTION, SOLUTION INTRAMUSCULAR; INTRAVENOUS
Status: DISCONTINUED | OUTPATIENT
Start: 2023-08-31 | End: 2023-08-31 | Stop reason: HOSPADM

## 2023-08-31 RX ADMIN — EPHEDRINE SULFATE 5 MG: 50 INJECTION, SOLUTION INTRAMUSCULAR; INTRAVENOUS; SUBCUTANEOUS at 14:06

## 2023-08-31 RX ADMIN — BUPIVACAINE HYDROCHLORIDE 30 ML: 5 INJECTION, SOLUTION EPIDURAL; INTRACAUDAL at 13:00

## 2023-08-31 RX ADMIN — FENTANYL CITRATE 25 MCG: 50 INJECTION, SOLUTION INTRAMUSCULAR; INTRAVENOUS at 13:53

## 2023-08-31 RX ADMIN — LIDOCAINE HYDROCHLORIDE 40 MG: 20 INJECTION, SOLUTION INFILTRATION; PERINEURAL at 13:23

## 2023-08-31 RX ADMIN — PROPOFOL 50 MG: 10 INJECTION, EMULSION INTRAVENOUS at 13:23

## 2023-08-31 RX ADMIN — EPHEDRINE SULFATE 5 MG: 50 INJECTION, SOLUTION INTRAMUSCULAR; INTRAVENOUS; SUBCUTANEOUS at 14:03

## 2023-08-31 RX ADMIN — EPHEDRINE SULFATE 5 MG: 50 INJECTION, SOLUTION INTRAMUSCULAR; INTRAVENOUS; SUBCUTANEOUS at 13:30

## 2023-08-31 RX ADMIN — FENTANYL CITRATE 50 MCG: 50 INJECTION, SOLUTION INTRAMUSCULAR; INTRAVENOUS at 13:02

## 2023-08-31 RX ADMIN — SODIUM CHLORIDE, SODIUM LACTATE, POTASSIUM CHLORIDE, CALCIUM CHLORIDE: 600; 310; 30; 20 INJECTION, SOLUTION INTRAVENOUS at 12:55

## 2023-08-31 RX ADMIN — PROPOFOL 100 MCG/KG/MIN: 10 INJECTION, EMULSION INTRAVENOUS at 13:21

## 2023-08-31 RX ADMIN — EPHEDRINE SULFATE 5 MG: 50 INJECTION, SOLUTION INTRAMUSCULAR; INTRAVENOUS; SUBCUTANEOUS at 13:39

## 2023-08-31 RX ADMIN — EPHEDRINE SULFATE 5 MG: 50 INJECTION, SOLUTION INTRAMUSCULAR; INTRAVENOUS; SUBCUTANEOUS at 13:33

## 2023-08-31 NOTE — ANESTHESIA CARE TRANSFER NOTE
Patient: Erwin Aguilar    Procedure: Procedure(s):  LEFT CUBITAL TUNNEL RELEASE,  LEFT OPEN CARPAL TUNNEL RELEASE,  Release left trigger finger thumb       Diagnosis: Cubital tunnel syndrome on left [G56.22]  Carpal tunnel syndrome of left wrist [G56.02]  Diagnosis Additional Information: No value filed.    Anesthesia Type:   Peripheral Nerve Block, MAC     Note:    Oropharynx: oropharynx clear of all foreign objects and spontaneously breathing  Level of Consciousness: drowsy  Oxygen Supplementation: room air    Independent Airway: airway patency satisfactory and stable  Dentition: dentition unchanged  Vital Signs Stable: post-procedure vital signs reviewed and stable  Report to RN Given: handoff report given  Patient transferred to: Phase II    Handoff Report: Identifed the Patient, Identified the Reponsible Provider, Reviewed the pertinent medical history, Discussed the surgical course, Reviewed Intra-OP anesthesia mangement and issues during anesthesia, Set expectations for post-procedure period and Allowed opportunity for questions and acknowledgement of understanding      Vitals:  Vitals Value Taken Time   BP 90/48 08/31/23 1435   Temp 36.2  C (97.2  F) 08/31/23 1435   Pulse 47    Resp 14 08/31/23 1435   SpO2 93% 08/31/23 1435       Electronically Signed By: TONIA FRANKS  August 31, 2023  2:41 PM

## 2023-08-31 NOTE — ANESTHESIA POSTPROCEDURE EVALUATION
Patient: Erwin Aguilar    Procedure: Procedure(s):  LEFT CUBITAL TUNNEL RELEASE,  LEFT OPEN CARPAL TUNNEL RELEASE,  Release left trigger finger thumb       Anesthesia Type:  Peripheral Nerve Block, MAC    Note:  Disposition: Outpatient   Postop Pain Control: Uneventful            Sign Out: Well controlled pain   PONV: No   Neuro/Psych: Uneventful            Sign Out: Acceptable/Baseline neuro status   Airway/Respiratory: Uneventful            Sign Out: Acceptable/Baseline resp. status   CV/Hemodynamics: Uneventful            Sign Out: Acceptable CV status; No obvious hypovolemia; No obvious fluid overload   Other NRE: NONE   DID A NON-ROUTINE EVENT OCCUR? No           Last vitals:  Vitals Value Taken Time   /60 08/31/23 1500   Temp 36.3  C (97.3  F) 08/31/23 1500   Pulse     Resp 14 08/31/23 1500   SpO2 96 % 08/31/23 1500       Electronically Signed By: Bryan Castillo MD  August 31, 2023  4:17 PM

## 2023-08-31 NOTE — DISCHARGE INSTRUCTIONS
Procedure Performed: Left carpal tunnel and cubital tunnel release  Attending Surgeon: Deny Dixon MD  Date: 8/31/2023    DIAGNOSIS  No diagnosis found.    MEDICATIONS   Resume all home medications as directed unless otherwise instructed during this hospitalization. If there is any question, double check with your primary care provider.  Start new discharge medications as directed.    Take 1 tablet of 650 mg Tylenol (acetaminophen) Arthritis Strength (extended release) and 1 tablet of Aleve (naproxen) 220 mg in the morning with breakfast and in the evening with dinner.     For breakthrough pain use narcotic pain medication as prescribed.    Do not drive or operate machinery while taking narcotic pain medications.   If you are taking other Tylenol containing medicines at home, be sure NOT to exceed 4 gram's (4000 milligrams) of Tylenol per day.   If you are taking pain medications, be sure to take Colace (docusate sodium) as well to prevent constipation. If constipated, try adding another cathartic or enema.  If nausea and vomiting, call the hospital or seek medical attention.    ACTIVITY   Weight bearing: Non-weight bearing to arm.    DIET  Resume same diet prior to your hospital admission.    WOUND   Leave dressing on until you are seen in clinic for your follow up visit.   Watch for signs and symptoms of infection of your wounds including; pain, redness, swelling, drainage or fever.  If you notice any of these symptoms please call or seek medical attention.    Keep wound clean, dry, and intact.  Do not submerge wounds in water until they are healed. No baths, soaking, swimming, or prolonged water exposure for 4 weeks after surgery.    RETURN   Follow-up with Orthopedic Clinic as directed.     Future Appointments   Date Time Provider Department [place]   9/11/2023 10:40 AM Deny Dixon MD [facility]   9/19/2023  8:00 AM Randi Martinez [facility]   [facility]2023 10:[facility]    10/3/2023 10:00 AM Randi Martinez [facility]   10/10/2023 10:00 AM Randi Martinez [facility]   10/20/2023  4:20 PM Markus Fox MD [facility]   1/9/2024 10:00 AM Wegener[facility]   8/5/2024  7:15 AM [facility]   8/5/2024  8:15 AM Miguel A Thomas PA-C [facility]       Call the [facility] at [phone] during business hours for any symptoms such as:    * Fevers with Temperature greater than 101.5 degrees.   * Pus drainage from wound site.   * Severe pain, not controlled by medication.   * Persistent nausea, vomiting and inablility to tolerate fluids.    If you are receiving care in [place], you may call the Orthopedic clinic at [phone].    FOR URGENT PROBLEMS ONLY, after hours or on weekends call the hospital  at [phone] and ask to speak with the orthopedic resident on call.    [facility]  Home Care Following Anesthesia  For 24 hours after surgery:  Get plenty of rest.  A responsible adult must stay with you for at least 24 hours after you leave the surgery center.  Do not drive or use heavy equipment.  If you have weakness or tingling, don't drive or use heavy equipment until this feeling goes away.   Do not drink alcohol.   Avoid strenuous or risky activities.  Ask for help when climbing stairs.  You may feel lightheaded.  IF so, sit for a few minutes before standing.  Have someone help you get up.   If you have nausea (feel sick to your stomach): Drink only clear liquids such as apple juice, ginger ale, broth or 7-Up.  Rest may also help.  Be sure to drink enough fluids.  Move to a regular diet as you feel able.   You may have a slight fever.  Call the doctor if your fever is over 100 F (37.7 C) (taken under the tongue) or lasts longer than 24 hours.  You may have a dry mouth, a sore throat, muscle aches or trouble sleeping. These should go away after 24 hours.  Do not make  "important or legal decisions.   It is recommended to avoid smoking.        Today you received a Marcaine or bupivacaine block to numb the nerves near your surgery site.  This is a block using local anesthetic or \"numbing\" medication injected around the nerves to anesthetize or \"numb\" the area supplied by those nerves.  This block is injected into the muscle layer near your surgical site.  The medication may numb the location where you had surgery for 6-18 hours, but may last up to 24 hours.  If your surgical site is an arm or leg you should be careful with your affected limb, since it is possible to injure your limb without being aware of it due to the numbing.  Until full feeling returns, you should guard against bumping or hitting your limb, and avoid extreme hot or cold temperatures on the skin.  As the block wears off, the feeling will return as a tingling or prickly sensation near your surgical site.  You will experience more discomfort from your incision as the feeling returns.  You may want to take a pain pill (a narcotic or Tylenol if this was prescribed by your surgeon) when you start to experience mild pain before the pain beccomes more severe.  If your pain medications do not control your pain you should notifiy your surgeon.    Tips for taking pain medications  To get the best pain relief possible, remember these points:  Take pain medications as directed, before pain becomes severe.  Pain medication can upset your stomach: taking it with food may help.  Constipation is a common side effect of pain medication. Drink plenty of  fluids.  Eat foods high in fiber. Take a stool softener if recommended by your doctor or pharmacist.  Do not drink alcohol, drive or operate machinery while taking pain medications.  Ask about other ways to control pain, such as with heat, ice or relaxation.    Tylenol/Acetaminophen Consumption    If you feel your pain relief is insufficient, you may take Tylenol/Acetaminophen in " addition to your narcotic pain medication.   Be careful not to exceed 4,000 mg of Tylenol/Acetaminophen in a 24 hour period from all sources.  If you are taking extra strength Tylenol/acetaminophen (500 mg), the maximum dose is 8 tablets in 24 hours.  If you are taking regular strength acetaminophen (325 mg), the maximum dose is 12 tablets in 24 hours.    Call a doctor for any of the following:  Signs of infection (fever, growing tenderness at the surgery site, a large amount of drainage or bleeding, severe pain, foul-smelling drainage, redness, swelling).  It has been over 8 to 10 hours since surgery and you are still not able to urinate (pass water).  Headache for over 24 hours.  Signs of Covid-19 infection (temperature over 100 degrees, shortness of breath, cough, loss of taste/smell, generalized body aches, persistent headache, chills, sore throat, nausea/vomiting/diarrhea)  Post Operative Instructions: Regional Anesthetic for Upper Extremity    General Information:   Regional anesthesia is when local anesthetic or  numbing  medication is injected around the nerves to anesthetize or  numb  the area supplied by that set of nerves.      Types of Regional Blocks:  Interscalene: A block injected into the neck on the operative shoulder/arm of a patient having shoulder surgery  Supraclavicular: A block injected near the clavicle on the operative shoulder of a patient having elbow, forearm, or hand surgery    Procedure:  The type of anesthesia your doctor used to numb your shoulder or arm will usually not wear off for 6-18 hours, but may last as long as 24 hours. You should be careful during that period, since it is possible to injure your arm without being aware of the injury. While your arm is numb, you should:  Avoid striking or bumping your arm  Avoid extreme hot or cold    Diet:  There are no restrictions on your diet. You should drink plenty of fluids.     Discomfort:  You will have a tingling and prickly  sensation in your arm as the feeling begins to return. You can also expect some discomfort. The amount of discomfort is unpredictable, but if you have more pain than can be controlled with pain medication you should notify your physician.     Pain Medicine:   Begin taking your oral pain pills (if you have not already done so) before bedtime and during the night to avoid a sudden onset of pain as the block wears off.  Do not engage in drinking, driving, or hazardous occupations while taking pain medication.     Stitches:   You may have stitches or special skin closures. You doctor will inform you when to return to the office to have them removed.     Activity:  On the day of surgery you should try to stay in bed with your hand elevated on pillows. You may resume your normal activity after that, wearing a sling for comfort. Contact your physician if you have any of the problems:   Continued numbness or tingling in the arm or hand after 24 hours  Swelling of the fingers or fingers that are cold to the touch  Excessive bleeding or drainage  Severe pain   Your doctor is:       Dr. Deny Dixon, Orthopaedics: [phone]               Or dial [phone] and ask for the resident on call for:  Orthopaedics  For emergency care, call the:  [facility] Emergency Department: [phone] (TTY for hearing impaired: [phone])

## 2023-08-31 NOTE — ANESTHESIA PREPROCEDURE EVALUATION
Anesthesia Pre-Procedure Evaluation    Patient: Erwin Aguilar   MRN: [id number] : 1960        Procedure : Procedure(s):  LEFT CUBITAL TUNNEL RELEASE,  LEFT OPEN CARPAL TUNNEL RELEASE,          Past Medical History:   Diagnosis Date    Actinic keratosis     aldara    Anxiety     Cancer (H)     squamous cell skin CA    Cauda equina spinal cord injury (H)     Chronic sinusitis 16    Depressive disorder     Diastasis recti     Esophageal reflux     Esophageal varices in cirrhosis (H) 2014    Hospitalized for UGI blee 3/28/14, endoscopy revealed bleeding varices.    Essential hypertension, benign     Intermittent asthma     Mild depression     Mixed hyperlipidemia     Nasal polyps 16    Osteoarthritis of lumbar spine, unspecified spinal osteoarthritis complication status     Other chronic pain     Paroxysmal atrial fibrillation (H) 2021    SCCA (squamous cell carcinoma) of skin     Seasonal allergic conjunctivitis     Type II or unspecified type diabetes mellitus without mention of complication, not stated as uncontrolled     Unspecified site of spinal cord injury without evidence of spinal bone injury     due to back surgery      Past Surgical History:   Procedure Laterality Date    ABDOMEN SURGERY      BACK SURGERY  2009    COLONOSCOPY N/A 2016    Procedure: COMBINED COLONOSCOPY, SINGLE OR MULTIPLE BIOPSY/POLYPECTOMY BY BIOPSY;  Surgeon: Ana Paula Urbina MD;  Location:  GI    ENDOSCOPY UPPER, COLONOSCOPY, COMBINED  10/19/2011    Procedure:COMBINED ENDOSCOPY UPPER, COLONOSCOPY; Upper Endoscopy, Colonoscopy with Polypectomy x4; Surgeon:AMBAR RODRÍGUEZ; Location: OR    ENT SURGERY      Ongoing since then    ESOPHAGOSCOPY, GASTROSCOPY, DUODENOSCOPY (EGD), COMBINED  3/28/2014    Procedure: COMBINED ESOPHAGOSCOPY, GASTROSCOPY, DUODENOSCOPY (EGD);  EGD, Gastric Biopsies, Esophageal Banding;  Surgeon: Reyna Tovar MD;  Location:  OR     ESOPHAGOSCOPY, GASTROSCOPY, DUODENOSCOPY (EGD), COMBINED  6/9/2014    Procedure: COMBINED ESOPHAGOSCOPY, GASTROSCOPY, DUODENOSCOPY (EGD);  Surgeon: Curtis Mendez MD;  [facility]    ESOPHAGOSCOPY, GASTROSCOPY, DUODENOSCOPY (EGD), COMBINED  7/24/2014    Procedure: COMBINED ESOPHAGOSCOPY, GASTROSCOPY, DUODENOSCOPY (EGD);  Surgeon: Gerard Samaniego MD;  [facility] OR    ESOPHAGOSCOPY, GASTROSCOPY, DUODENOSCOPY (EGD), COMBINED N/A 10/31/2014    Procedure: COMBINED ESOPHAGOSCOPY, GASTROSCOPY, DUODENOSCOPY (EGD);  Surgeon: Gerard Samaniego MD;  [facility] OR    ESOPHAGOSCOPY, GASTROSCOPY, DUODENOSCOPY (EGD), COMBINED N/A 5/12/2016    Procedure: COMBINED ESOPHAGOSCOPY, GASTROSCOPY, DUODENOSCOPY (EGD);  Surgeon: Ana Paula Urbina MD;  [facility] GI    ESOPHAGOSCOPY, GASTROSCOPY, DUODENOSCOPY (EGD), COMBINED N/A 8/2/2018    Procedure: COMBINED ESOPHAGOSCOPY, GASTROSCOPY, DUODENOSCOPY (EGD);  EGD;  Surgeon: Yu Wagner MD;  Location: [facility] GI    HCL SQUAMOUS CELL CARCINOMA AG  10/07    left forearm    HERNIORRHAPHY UMBILICAL  11/8/2012    Procedure: HERNIORRHAPHY UMBILICAL;  Open Umbilical Hernia Repair With Mesh ;  Surgeon: Chase Nicholson MD;  Location[facility]    INSERT STIMULATOR DORSAL COLUMN N/A 6/28/2018    Procedure: INSERT STIMULATOR DORSAL COLUMN;;  Surgeon: Elvis Sauceda MD;  [facility]    neuro stimulator  2010    REMOVE GENERATOR STIMULATOR (LOCATION) N/A 6/28/2018    Procedure: REMOVE GENERATOR STIMULATOR (LOCATION);  Spinal Cord Stimulator and IPG Explant and Re-Implant of SCS System (Leads and IPG);  Surgeon: Elvis Sauceda MD;  [facility]    REPAIR TENDON ACHILLES Right 11/11/2020    Procedure: Right achilles debridement and partial calcaneus excision;  Surgeon: Eh Sandoval MD;  [facility]    SURGICAL HISTORY OF -   1/02    abcess tooth    SURGICAL HISTORY OF -   1999    L4-5 laminectomy,  cauda equina syndrome    SURGICAL HISTORY OF -   +    herniated disk repair    TONSILLECTOMY  10     TRANSPOSITION ULNAR NERVE (ELBOW)      right    ZZC APPENDECTOMY        Allergies   Allergen Reactions    Levaquin Anaphylaxis and Rash     Swelling in lip and tongue felt thick    Lisinopril Anaphylaxis     Swollen tongue; inability to swallow; drooling; hives; swollen face, neck, angioedema    Acetaminophen      Hx of cirrhosis     Amlodipine      Swelling, hives, possible angioedema      Morphine      b/p dropped and arms went numb  Hypotension    Quinolones Hives    Spironolactone Unknown     Pt believes himself to be allergic to it; cannot find his old records of this.-mjc     Bactrim [Sulfamethoxazole-Trimethoprim] Rash      Social History     Tobacco Use    Smoking status: Former     Packs/day: 0.00     Years: 1.00     Pack years: 0.00     Types: Cigarettes     Start date: 10/13/1983     Quit date: 1984     Years since quittin.2     Passive exposure: Past    Smokeless tobacco: Never   Substance Use Topics    Alcohol use: Not Currently     Comment: a pint of alcohol / day - last drink was 3/28/14      Wt Readings from Last 1 Encounters:   23 99.3 kg (219 lb)        Anesthesia Evaluation   Pt has had prior anesthetic.         ROS/MED HX  ENT/Pulmonary:     (+)                     asthma                  Neurologic:     (+)                     Spinal cord injury,           Cardiovascular:     (+)  hypertension- -   -  - -                                      METS/Exercise Tolerance:     Hematologic:  - neg hematologic  ROS     Musculoskeletal:       GI/Hepatic:     (+)             liver disease,       Renal/Genitourinary:       Endo:     (+)  type II DM,                    Psychiatric/Substance Use:       Infectious Disease:  - neg infectious disease ROS     Malignancy:       Other:      (+)  , H/O Chronic Pain,         Physical Exam    Airway        Mallampati: I   TM distance: > 3 FB    Neck ROM: full   Mouth opening: > 3 cm    Respiratory Devices and Support         Dental       (+) Edentulous      Cardiovascular   cardiovascular exam normal          Pulmonary   pulmonary exam normal                OUTSIDE LABS:  CBC:   Lab Results   Component Value Date    WBC 5.8 08/10/2023    WBC 9.0 07/16/2022    HGB 14.5 08/10/2023    HGB 14.0 07/16/2022    HCT 42.5 08/10/2023    HCT 39.4 (L) 07/16/2022     08/10/2023     07/16/2022     BMP:   Lab Results   Component Value Date     08/10/2023     05/01/2023    POTASSIUM 3.8 08/10/2023    POTASSIUM 3.9 05/01/2023    CHLORIDE 103 08/10/2023    CHLORIDE 102 05/01/2023    CO2 27 08/10/2023    CO2 25 05/01/2023    BUN 9.6 08/10/2023    BUN 13.2 05/01/2023    CR 0.79 08/10/2023    CR 0.78 05/01/2023     (H) 08/10/2023    GLC 85 05/01/2023     COAGS:   Lab Results   Component Value Date    PTT 63 (H) 07/16/2022    INR 1.11 08/10/2023     POC:   Lab Results   Component Value Date     (H) 11/11/2020     HEPATIC:   Lab Results   Component Value Date    ALBUMIN 4.7 08/10/2023    PROTTOTAL 7.6 08/10/2023    ALT 26 08/10/2023    AST 26 08/10/2023    ALKPHOS 91 08/10/2023    BILITOTAL 0.5 08/10/2023     OTHER:   Lab Results   Component Value Date    LACT 1.0 07/16/2022    A1C 6.7 (H) 08/10/2023    AFRICA 9.6 08/10/2023    PHOS 2.9 05/21/2013    MAG 1.9 07/11/2015    LIPASE 134 11/23/2020    TSH 1.36 10/09/2018    CRP 6.4 08/10/2015    SED 15 07/16/2022       Anesthesia Plan    ASA Status:  2       Anesthesia Type: Peripheral Nerve Block.   Induction: Propofol.   Maintenance: Balanced.        Consents    Anesthesia Plan(s) and associated risks, benefits, and realistic alternatives discussed. Questions answered and patient/representative(s) expressed understanding.     - Discussed: Risks, Benefits and Alternatives for BOTH SEDATION and the PROCEDURE were discussed     - Discussed with:  Patient      - Extended Intubation/Ventilatory  Support Discussed: No.      - Patient is DNR/DNI Status: No     Use of blood products discussed: No .     Postoperative Care    Pain management: IV analgesics.   PONV prophylaxis: Ondansetron (or other 5HT-3), Dexamethasone or Solumedrol, Background Propofol Infusion     Comments:                Bryan Castillo MD

## 2023-08-31 NOTE — OR NURSING
Patient received left side Brachial Plexus nerve block  without Exparel.  Fentanyl 50mcg and Versed 1mg given. Tolerated procedure well.     Giovana Palacio RN

## 2023-08-31 NOTE — ANESTHESIA PROCEDURE NOTES
Brachial plexus Procedure Note    Pre-Procedure   Staff -        Anesthesiologist:  Bryan Castillo MD       Resident/Fellow: Charles Thomas MD       Performed By: resident and with residents       Procedure performed by resident/fellow/CRNA in presence of a teaching physician.         Location: pre-op       Pre-Anesthestic Checklist: patient identified, IV checked, site marked, risks and benefits discussed, informed consent, monitors and equipment checked, pre-op evaluation, at physician/surgeon's request and post-op pain management  Timeout:       Correct Patient: Yes        Correct Procedure: Yes        Correct Site: Yes        Correct Position: Yes        Correct Laterality: Yes        Site Marked: Yes  Procedure Documentation  Procedure: Brachial plexus       Diagnosis: POSTOP PAIN       Laterality: left       Patient Position: sitting       Patient Prep/Sterile Barriers: sterile gloves, mask       Skin prep: Chloraprep (supraclavicular approach).       Needle Gauge: 21.        Needle Length (millimeters): 110        Ultrasound guided       1. Ultrasound was used to identify targeted nerve, plexus, vascular marker, or fascial plane and place a needle adjacent to it in real-time.       2. Ultrasound was used to visualize the spread of anesthetic in close proximity to the above referenced structure.       3. A permanent image is entered into the patient's record.    Assessment/Narrative         The placement was negative for: blood aspirated, painful injection and site bleeding       Paresthesias: No.       Bolus given via needle. no blood aspirated via catheter.        Secured via.        Insertion/Infusion Method: Single Shot       Complications: none    Medication(s) Administered   Bupivacaine 0.5% PF (Infiltration) - Infiltration   30 mL - 8/31/2023 1:00:00 PM   Comments:  Routine left supraclavicular brachial plexus nerve block without complications. Patient tolerated well.      FOR [facility]  "Bank) ONLY:   Pain Team Contact information: please page the Pain Team Via [facility]. Search \"Pain\". During daytime hours, please page the attending first. At night please page the resident first.      "

## 2023-08-31 NOTE — BRIEF OP NOTE
[facility]    Brief Operative Note    Pre-operative diagnosis: Cubital tunnel syndrome on left [G56.22]  Carpal tunnel syndrome of left wrist [G56.02]  Post-operative diagnosis Same as pre-operative diagnosis    Procedure: Procedure(s):  LEFT CUBITAL TUNNEL RELEASE,  LEFT OPEN CARPAL TUNNEL RELEASE,  Release left trigger finger thumb  Surgeon: Surgeon(s) and Role:     * Deny Dixon MD - Primary     * Jt Jeong MD - Resident - Assisting  Anesthesia: MAC with Block   Estimated Blood Loss: Minimal    Drains: None  Specimens: * No specimens in log *  Findings:   See full op note .  Complications: None.  Implants: * No implants in log *    Jt Jeong MD  Orthopedic Surgery, PGY-4
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
## 2023-09-01 ENCOUNTER — MYC MEDICAL ADVICE (OUTPATIENT)
Dept: FAMILY MEDICINE | Facility: CLINIC | Age: 63
End: 2023-09-01
Payer: MEDICARE

## 2023-09-01 DIAGNOSIS — M25.571 CHRONIC PAIN OF RIGHT ANKLE: ICD-10-CM

## 2023-09-01 DIAGNOSIS — S86.011A RUPTURE OF RIGHT ACHILLES TENDON, INITIAL ENCOUNTER: ICD-10-CM

## 2023-09-01 DIAGNOSIS — G89.4 CHRONIC PAIN SYNDROME: ICD-10-CM

## 2023-09-01 DIAGNOSIS — M21.6X1 ACQUIRED CALCANEUS DEFORMITY OF RIGHT ANKLE: ICD-10-CM

## 2023-09-01 DIAGNOSIS — G89.29 CHRONIC PAIN OF RIGHT ANKLE: ICD-10-CM

## 2023-09-01 DIAGNOSIS — M65.28 CALCIFIC ACHILLES TENDINITIS: ICD-10-CM

## 2023-09-01 DIAGNOSIS — M79.671 INTRACTABLE RIGHT HEEL PAIN: ICD-10-CM

## 2023-09-01 DIAGNOSIS — Z98.890 H/O FOOT SURGERY: ICD-10-CM

## 2023-09-04 ENCOUNTER — HOSPITAL ENCOUNTER (EMERGENCY)
Facility: CLINIC | Age: 63
Discharge: HOME | End: 2023-09-05
Admitting: EMERGENCY MEDICINE
Payer: MEDICARE

## 2023-09-04 VITALS
TEMPERATURE: 97.9 F | DIASTOLIC BLOOD PRESSURE: 79 MMHG | RESPIRATION RATE: 16 BRPM | OXYGEN SATURATION: 96 % | SYSTOLIC BLOOD PRESSURE: 145 MMHG | HEART RATE: 54 BPM

## 2023-09-04 PROCEDURE — 99281 EMR DPT VST MAYX REQ PHY/QHP: CPT

## 2023-09-04 ASSESSMENT — ACTIVITIES OF DAILY LIVING (ADL)
ADLS_ACUITY_SCORE: 35

## 2023-09-04 NOTE — ED TRIAGE NOTES
Needed to have about 6 inches of wrapping on R wrist reapplied     Triage Assessment       Row Name 09/04/23 9410       Triage Assessment (Adult)    Airway WDL WDL       Respiratory WDL    Respiratory WDL WDL       Skin Circulation/Temperature WDL    Skin Circulation/Temperature WDL WDL       Cardiac WDL    Cardiac WDL WDL       Peripheral/Neurovascular WDL    Peripheral Neurovascular WDL WDL       Cognitive/Neuro/Behavioral WDL    Cognitive/Neuro/Behavioral WDL WDL       Marry Coma Scale    Best Eye Response 4-->(E4) spontaneous    Best Motor Response 6-->(M6) obeys commands    Best Verbal Response 5-->(V5) oriented    [place] Coma Scale Score 15

## 2023-09-05 RX ORDER — OXYCODONE HYDROCHLORIDE 10 MG/1
10 TABLET ORAL 3 TIMES DAILY
Qty: 21 TABLET | Refills: 0 | Status: SHIPPED | OUTPATIENT
Start: 2023-09-05 | End: 2023-09-12

## 2023-09-05 ASSESSMENT — ACTIVITIES OF DAILY LIVING (ADL)
ADLS_ACUITY_SCORE: 35

## 2023-09-11 ENCOUNTER — OFFICE VISIT (OUTPATIENT)
Dept: ORTHOPEDICS | Facility: CLINIC | Age: 63
End: 2023-09-11
Payer: MEDICARE

## 2023-09-11 DIAGNOSIS — M65.312 TRIGGER THUMB OF LEFT HAND: Primary | ICD-10-CM

## 2023-09-11 PROCEDURE — 99024 POSTOP FOLLOW-UP VISIT: CPT | Performed by: STUDENT IN AN ORGANIZED HEALTH CARE EDUCATION/TRAINING PROGRAM
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
## 2023-09-11 NOTE — NURSING NOTE
Reason For Visit:   Chief Complaint   Patient presents with    Surgical Followup     Follow up on left cubital tunnel release, left open carpal tunnel release, and left thumb trigger release DOS 8/31/23       Primary MD: Wegener, Joel Daniel Irwin  Ref. MD: real    Age: 62 year old    ?  No      There were no vitals taken for this visit.      Pain Assessment  Patient Currently in Pain: Yes (hand strength and ROM is normal)  0-10 Pain Scale: 9  Primary Pain Location: Elbow (left)  Pain Descriptors: Aching, Intermittent, Tender  Alleviating Factors: Pain medication    Hand Dominance Evaluation  Hand Dominance: Right          QuickDASH Assessment      9/11/2023     9:55 AM   QuickDASH Main   1. Open a tight or new jar Unable to answer   2. Do heavy household chores (e.g., wash walls, floors) Unable to answer   3. Carry a shopping bag or briefcase Unable to answer   4. Wash your back Unable to answer   5. Use a knife to cut food Unable to answer   6. Recreational activities in which you take some force or impact through your arm, shoulder or hand (e.g., golf, hammering, tennis, etc.) Unable to answer   7. During the past week, to what extent has your arm, shoulder or hand problem interfered with your normal social activities with family, friends, neighbours or groups Unable to answer   8. During the past week, were you limited in your work or other regular daily activities as a result of your arm, shoulder or hand problem Unable to answer   9. Arm, shoulder or hand pain Severe   10.Tingling (pins and needles) in your arm,shoulder or hand Severe   11. During the past week, how much difficulty have you had sleeping because of the pain in your arm, shoulder or hand Severe difficulty   Quickdash Ability Score 2.27          Current Outpatient Medications   Medication Sig Dispense Refill    ACE/ARB NOT PRESCRIBED, INTENTIONAL, ACE & ARB not prescribed due to Allergy      albuterol (PROAIR HFA/PROVENTIL HFA/VENTOLIN  HFA) 108 (90 Base) MCG/ACT inhaler INHALE 2 PUFFS BY MOUTH EVERY 6 HOURS AS NEEDED FOR SHORTNESS OF BREATH/DYSPNEA OR WHEEZING 18 g 1    apixaban ANTICOAGULANT (ELIQUIS ANTICOAGULANT) 5 MG tablet Take 1 tablet (5 mg) by mouth 2 times daily 180 tablet 1    baclofen (LIORESAL) 10 MG tablet TAKE 2 TABLETS BY MOUTH THREE TIMES DAILY 180 tablet 3    Continuous Blood Gluc  (FREESTYLE CACHORRO 2 READER) GASTON USE TO READ BLOOD SUGARS AS PER 'S INSTRUCTIONS. 1 each 0    Continuous Blood Gluc Sensor (FREESTYLE CACHORRO 2 SENSOR) MISC CHANGE EVERY 14 DAYS 1 each 5    doxazosin (CARDURA) 8 MG tablet TAKE 1 TABLET (8 MG) BY MOUTH AT BEDTIME 90 tablet 1    gabapentin (NEURONTIN) 300 MG capsule TAKE 1 CAPSULE BY MOUTH IN THE AFTERNOON AND 2 CAPSULES AT BEDTIME (Patient taking differently: TAKE 2 CAPSULE BY MOUTH IN THE MORNING AND 1 CAPSULES AT BEDTIME) 90 capsule 1    GLOBAL EASE INJECT PEN NEEDLES 32G X 4 MM miscellaneous USE AS DIRECTED EVERY  each 1    insulin glargine (LANTUS PEN) 100 UNIT/ML pen Inject 26 Units Subcutaneous At Bedtime 30 mL 3    medical cannabis (Patient's own supply) See Admin Instructions (The purpose of this order is to document that the patient reports taking medical cannabis.  This is not a prescription, and is not used to certify that the patient has a qualifying medical condition.)   Flower - PRN      oxyCODONE (ROXICODONE) 5 MG tablet Take 1 tablet (5 mg) by mouth every 6 hours as needed for moderate to severe pain 8 tablet 0    oxyCODONE (ROXICODONE) 5 MG tablet Take 1 tablet (5 mg) by mouth 3 times daily as needed for pain (visit with Dr. Wegener every three months) 90 tablet 0    oxyCODONE IR (ROXICODONE) 10 MG tablet Take 1 tablet (10 mg) by mouth 3 times daily for 7 days (Ok to fill now due to increased post op pain med needs) 21 tablet 0    promethazine (PHENERGAN) 25 MG tablet TAKE 1 TABLET(25 MG) BY MOUTH THREE TIMES DAILY AS NEEDED FOR NAUSEA 90 tablet 4    propranolol  (INDERAL) 40 MG tablet Take 2 tablets (80 mg) by mouth 2 times daily 360 tablet 3    rosuvastatin (CRESTOR) 20 MG tablet Take 1 tablet (20 mg) by mouth daily 90 tablet 3    sertraline (ZOLOFT) 100 MG tablet TAKE 2 TABLETS BY MOUTH DAILY (Patient taking differently: Take 200 mg by mouth daily TAKE 2 TABLETS BY MOUTH DAILY) 180 tablet 3    triamterene-HCTZ (DYAZIDE) 37.5-25 MG capsule Take 2 capsules by mouth every morning (Patient taking differently: Take 1 capsule by mouth every morning) 180 capsule 3       Allergies   Allergen Reactions    Levaquin Anaphylaxis and Rash     Swelling in lip and tongue felt thick    Lisinopril Anaphylaxis     Swollen tongue; inability to swallow; drooling; hives; swollen face, neck, angioedema    Acetaminophen      Hx of cirrhosis     Amlodipine      Swelling, hives, possible angioedema      Morphine      b/p dropped and arms went numb  Hypotension    Quinolones Hives    Spironolactone Unknown     Pt believes himself to be allergic to it; cannot find his old records of this.-mjc     Bactrim [Sulfamethoxazole-Trimethoprim] Rash       Chasidy Trujillo, ATC
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
## 2023-09-13 ENCOUNTER — MYC MEDICAL ADVICE (OUTPATIENT)
Dept: FAMILY MEDICINE | Facility: CLINIC | Age: 63
End: 2023-09-13
Payer: MEDICARE

## 2023-09-13 DIAGNOSIS — G89.29 CHRONIC PAIN OF RIGHT ANKLE: ICD-10-CM

## 2023-09-13 DIAGNOSIS — M21.6X1 ACQUIRED CALCANEUS DEFORMITY OF RIGHT ANKLE: ICD-10-CM

## 2023-09-13 DIAGNOSIS — M65.28 CALCIFIC ACHILLES TENDINITIS: ICD-10-CM

## 2023-09-13 DIAGNOSIS — G89.18 ACUTE POST-OPERATIVE PAIN: Primary | ICD-10-CM

## 2023-09-13 DIAGNOSIS — M79.671 INTRACTABLE RIGHT HEEL PAIN: ICD-10-CM

## 2023-09-13 DIAGNOSIS — S86.011A RUPTURE OF RIGHT ACHILLES TENDON, INITIAL ENCOUNTER: ICD-10-CM

## 2023-09-13 DIAGNOSIS — M25.571 CHRONIC PAIN OF RIGHT ANKLE: ICD-10-CM

## 2023-09-13 DIAGNOSIS — G89.4 CHRONIC PAIN SYNDROME: ICD-10-CM

## 2023-09-13 DIAGNOSIS — Z98.890 H/O FOOT SURGERY: ICD-10-CM

## 2023-09-14 DIAGNOSIS — R11.0 NAUSEA WITHOUT VOMITING: ICD-10-CM

## 2023-09-14 DIAGNOSIS — K31.84 GASTROPARESIS: ICD-10-CM

## 2023-09-14 RX ORDER — OXYCODONE HYDROCHLORIDE 5 MG/1
5 TABLET ORAL
Qty: 35 TABLET | Refills: 0 | Status: SHIPPED | OUTPATIENT
Start: 2023-09-14 | End: 2023-09-20

## 2023-09-14 RX ORDER — PROMETHAZINE HYDROCHLORIDE 25 MG/1
TABLET ORAL
Qty: 270 TABLET | Refills: 1 | Status: SHIPPED | OUTPATIENT
Start: 2023-09-14 | End: 2024-03-08

## 2023-09-18 NOTE — PROGRESS NOTES
ACUPUNCTURIST TREATMENT NOTE      Erwin Aguilar, a 62 year old male, is here today for Initial exam. Patient is referred by Sirisha.    HPI  Main Complaint: Chronic right lumbar radiculopathy pain, right ankle/foot pain     Patient with long history of chronic low back pain.  In early  he had lower back surgery,  he had another surgery due to cauda equina syndrome.  In , had SCS implanted which initially helped, but then pain began to worsen again. He had revision of SCS in 2018. Reports SCS battery has  so it is no longer working and having more pain now. Reports more lack of strength in low back vs pain. Most pain is in lateral and posterior RLE, also with cramping in right foot.    Very poor sleep due to pain. Reports nothing has been helpful, does not like to take opioids, is currently weaning off. Reports he is very limited in what he can do physically as far as exercises. Reports medical cannabis has been somewhat helpful.    Also with cauda equina syndrome - he is continent but reports he has sensation of always needing to go to the bathroom. Very little sensation below waist line.    From pain management note on 23:  As a sequela of the previous spine surgeries, he developed right foot drop.  He also had Achilles tendon repair in 2020 and had a complicated recovery and needed wound VAC for 3 months postop. At some point, he was diagnosed with CRPS.  Then he had bony spur on the right ankle for which he had surgery.  Since then he developed right ankle/foot pain.  Currently this pain is most bothersome.  However, he noted new onset of radicular pain starting in the right hamstring, right side of the leg down to the dorsum of the foot.        Acupuncture Intake Form    Have you had acupuncture before? no  For what conditions?      What are you seeking treatment for today? lower back and leg pain weakness numbness    Health History    What are your most important health concerns?   Please list in order of importance:     Health Concern 1: pain spasms weakness  Date of Onset: october 1999    Health Concern 2: liver damage  Date of Onset: August 2014    Health Concern 3:    Date of Onset:      Is there anything that improves or aggravates these conditions? weather activity or no reason@ all    Do you have a pacemaker? No    Do you have any other active implanted medical devices? (such as: spinal cord stimulator, deep brain stimulator, cochlear implant etc.) If yes, please list: spinal cord stimulator    Lifestyle History    How much caffeine do you consume? 7  (Drinks per week)         What type? Coffee/espresso drinks     How much soda pop do you consume? 0  (Drinks per week)       What type?      How much water do you drink per day? 72 ounces    # hours you sleep per night: 5    Do you sleep well? no  Do you awake feeling rested? no    At what time of day is your energy typically at its best? morning  At its worst? at any point during the day    How much change are you willing to/able to make at this time to improve your health?        Past Medical History  Past Medical History Reviewed: Yes   has a past medical history of Actinic keratosis, Anxiety (), Cancer (H), Cauda equina spinal cord injury (H), Chronic sinusitis (5-1-16), Depressive disorder, Diastasis recti, Esophageal reflux, Esophageal varices in cirrhosis (H) (4/1/2014), Essential hypertension, benign, Intermittent asthma, Mild depression, Mixed hyperlipidemia, Nasal polyps (5-1-16), Osteoarthritis of lumbar spine, unspecified spinal osteoarthritis complication status, Other chronic pain, Paroxysmal atrial fibrillation (H) (9/22/2021), SCCA (squamous cell carcinoma) of skin, Seasonal allergic conjunctivitis, Type II or unspecified type diabetes mellitus without mention of complication, not stated as uncontrolled, and Unspecified site of spinal cord injury without evidence of spinal bone injury.    Objective    TCM Exam  Completed: Yes  Energy: Low  Sleep: restless due to pain  Limbs/Back: Right Lower Extremity and Lower Back  Neurological:cauda equina syndrome    Tongue/Pulse Exam Completed: No    Patient Assessment  Patient Type: Pain  Patient Complaint: Chronic low back and RLE pain        9/19/2023     8:00 AM   Acupuncture   Intervention Reason Pain   Pain Location low back, RLE   Pre-session Pain Rating 9       TCM Diagnosis: Qi Deficiency;Blood Deficiency;Qi Stagnation;Blood Stagnation;Other channel obstruction    Treatment Principle: Nourish qi and blood, course qi and blood, unblock channels, stop pain    TCM / Acupuncture Treatment  Acupuncture Points:       Initial insertions: Baihui, HTJJ L4-L5, Shiqizhuixia       Second insertions: Ub23, Ub31, Ub32 (R): [id number], Yaoyan, Ub53, Ub54, Piriformis MP       Additional insertions: (R): Gb29, Gb30, Gb31, Gb32, Gb33, Gb34, Gb41, Ub37, Ub40, Ub57                   Assessment and Plan  Treatment Observations: Patient side lying on left side. Was able to relax well during treatment, stated his leg felt relaxed during treatment.  Acupuncture Treatment Recommendations: Acupuncture 1/wk for 12 weeks for diagnoses 1.6X1 (ICD-10-CM) - Acquired calcaneus deformity of right ankle  M25.571, G89.29 (ICD-10-CM) - Chronic pain of right ankle  M65.28 (ICD-10-CM) - Calcific Achilles tendinitis  Z98.890 (ICD-10-CM) - H/O foot surgery  M79.671 (ICD-10-CM) - Intractable right heel pain  M54.50, G89.29 (ICD-10-CM) - Chronic low back pain       It is my recommendation that this patient seek advice from their Primary Care Provider about active symptoms not addressed during this visit. The risks and benefits of acupuncture were reviewed and the patient stated understanding. The patient's questions were answered to their satisfaction. Consent was provided for treatment. We thank you for the referral and opportunity to treat this patient.    Time Spent with Patient:   I spent a total of 40 minutes  face-to-face with Erwin Aguilar during today's office visit.     Randi Martinez L.Ac.  09/19/23        Answers submitted by the patient for this visit:  Symptoms you have experienced in the last 30 days (Submitted on 9/19/2023)  General Symptoms: No  Skin Symptoms: No  HENT Symptoms: No  EYE SYMPTOMS: No  HEART SYMPTOMS: No  LUNG SYMPTOMS: No  INTESTINAL SYMPTOMS: No  URINARY SYMPTOMS: No  REPRODUCTIVE SYMPTOMS: No  SKELETAL SYMPTOMS: Yes  BLOOD SYMPTOMS: No  NERVOUS SYSTEM SYMPTOMS: No  MENTAL HEALTH SYMPTOMS: No  Please answer the questions below to tell us what condition you are experiencing: (Submitted on 9/19/2023)  Back pain: Yes  Muscle aches: Yes  Neck pain: No  Swollen joints: No  Joint pain: Yes  Bone pain: Yes  Muscle cramps: Yes  Muscle weakness: Yes  Joint stiffness: No  Bone fracture: Yes

## 2023-09-19 ENCOUNTER — OFFICE VISIT (OUTPATIENT)
Dept: PALLIATIVE MEDICINE | Facility: OTHER | Age: 63
End: 2023-09-19
Attending: ANESTHESIOLOGY
Payer: MEDICARE

## 2023-09-19 DIAGNOSIS — Z98.890 H/O FOOT SURGERY: ICD-10-CM

## 2023-09-19 DIAGNOSIS — M21.6X1 ACQUIRED CALCANEUS DEFORMITY OF RIGHT ANKLE: ICD-10-CM

## 2023-09-19 DIAGNOSIS — G89.29 CHRONIC LOW BACK PAIN: ICD-10-CM

## 2023-09-19 DIAGNOSIS — M79.671 INTRACTABLE RIGHT HEEL PAIN: ICD-10-CM

## 2023-09-19 DIAGNOSIS — G89.29 CHRONIC PAIN OF RIGHT ANKLE: ICD-10-CM

## 2023-09-19 DIAGNOSIS — M54.50 CHRONIC LOW BACK PAIN: ICD-10-CM

## 2023-09-19 DIAGNOSIS — M62.830 BACK MUSCLE SPASM: ICD-10-CM

## 2023-09-19 DIAGNOSIS — M54.59 OTHER LOW BACK PAIN: Primary | ICD-10-CM

## 2023-09-19 DIAGNOSIS — I10 HYPERTENSION GOAL BP (BLOOD PRESSURE) < 130/80: ICD-10-CM

## 2023-09-19 DIAGNOSIS — M65.28 CALCIFIC ACHILLES TENDINITIS: ICD-10-CM

## 2023-09-19 DIAGNOSIS — M62.838 MUSCLE SPASMS OF BOTH LOWER EXTREMITIES: ICD-10-CM

## 2023-09-19 DIAGNOSIS — M25.571 CHRONIC PAIN OF RIGHT ANKLE: ICD-10-CM

## 2023-09-19 PROCEDURE — 97810 ACUP 1/> WO ESTIM 1ST 15 MIN: CPT | Performed by: ACUPUNCTURIST

## 2023-09-19 PROCEDURE — 97811 ACUP 1/> W/O ESTIM EA ADD 15: CPT | Performed by: ACUPUNCTURIST

## 2023-09-19 ASSESSMENT — ENCOUNTER SYMPTOMS
MUSCLE CRAMPS: 1
NECK PAIN: 0
JOINT SWELLING: 0
ARTHRALGIAS: 1
BACK PAIN: 1
MUSCLE WEAKNESS: 1
MYALGIAS: 1
STIFFNESS: 0

## 2023-09-19 NOTE — TELEPHONE ENCOUNTER
JW,      Baclofen:  Routing refill request to provider for review/approval because:  Drug not on the [facility] refill protocol     Triamterene/hydrochlorothiazide:  Routing refill request to provider for review/approval because:  BP Readings from Last 3 Encounters:   09/04/23 (!) 145/79   08/31/23 124/60   08/10/23 (!) 199/96     VV 8/18/23    Thank you,  Isela Villar RN

## 2023-09-20 ENCOUNTER — MYC MEDICAL ADVICE (OUTPATIENT)
Dept: FAMILY MEDICINE | Facility: CLINIC | Age: 63
End: 2023-09-20
Payer: MEDICARE

## 2023-09-20 DIAGNOSIS — Z98.890 H/O FOOT SURGERY: ICD-10-CM

## 2023-09-20 DIAGNOSIS — M79.671 INTRACTABLE RIGHT HEEL PAIN: ICD-10-CM

## 2023-09-20 DIAGNOSIS — M21.6X1 ACQUIRED CALCANEUS DEFORMITY OF RIGHT ANKLE: ICD-10-CM

## 2023-09-20 DIAGNOSIS — M25.571 CHRONIC PAIN OF RIGHT ANKLE: ICD-10-CM

## 2023-09-20 DIAGNOSIS — S86.011A RUPTURE OF RIGHT ACHILLES TENDON, INITIAL ENCOUNTER: ICD-10-CM

## 2023-09-20 DIAGNOSIS — G89.18 ACUTE POST-OPERATIVE PAIN: ICD-10-CM

## 2023-09-20 DIAGNOSIS — G89.29 CHRONIC PAIN OF RIGHT ANKLE: ICD-10-CM

## 2023-09-20 DIAGNOSIS — M65.28 CALCIFIC ACHILLES TENDINITIS: ICD-10-CM

## 2023-09-20 DIAGNOSIS — G89.4 CHRONIC PAIN SYNDROME: ICD-10-CM

## 2023-09-20 RX ORDER — BACLOFEN 10 MG/1
20 TABLET ORAL 3 TIMES DAILY
Qty: 360 TABLET | Refills: 3 | Status: SHIPPED | OUTPATIENT
Start: 2023-09-20 | End: 2024-07-28

## 2023-09-20 RX ORDER — OXYCODONE HYDROCHLORIDE 5 MG/1
5 TABLET ORAL
Qty: 35 TABLET | Refills: 0 | Status: SHIPPED | OUTPATIENT
Start: 2023-09-21 | End: 2023-09-28

## 2023-09-20 RX ORDER — TRIAMTERENE AND HYDROCHLOROTHIAZIDE 37.5; 25 MG/1; MG/1
1 CAPSULE ORAL EVERY MORNING
Qty: 90 CAPSULE | Refills: 3 | Status: SHIPPED | OUTPATIENT
Start: 2023-09-20 | End: 2024-01-09

## 2023-09-20 NOTE — TELEPHONE ENCOUNTER
JW,      Please see Trly [employer] message.  Last Rx 9/14 for #35  VV 8/18/23.      Thank you,  Isela Villar RN

## 2023-09-27 ENCOUNTER — MYC MEDICAL ADVICE (OUTPATIENT)
Dept: FAMILY MEDICINE | Facility: CLINIC | Age: 63
End: 2023-09-27
Payer: MEDICARE

## 2023-09-27 DIAGNOSIS — G89.4 CHRONIC PAIN SYNDROME: ICD-10-CM

## 2023-09-27 DIAGNOSIS — G89.29 CHRONIC PAIN OF RIGHT ANKLE: ICD-10-CM

## 2023-09-27 DIAGNOSIS — Z98.890 H/O FOOT SURGERY: ICD-10-CM

## 2023-09-27 DIAGNOSIS — S86.011A RUPTURE OF RIGHT ACHILLES TENDON, INITIAL ENCOUNTER: ICD-10-CM

## 2023-09-27 DIAGNOSIS — M79.671 INTRACTABLE RIGHT HEEL PAIN: ICD-10-CM

## 2023-09-27 DIAGNOSIS — G89.18 ACUTE POST-OPERATIVE PAIN: ICD-10-CM

## 2023-09-27 DIAGNOSIS — M65.28 CALCIFIC ACHILLES TENDINITIS: ICD-10-CM

## 2023-09-27 DIAGNOSIS — M25.571 CHRONIC PAIN OF RIGHT ANKLE: ICD-10-CM

## 2023-09-27 DIAGNOSIS — M21.6X1 ACQUIRED CALCANEUS DEFORMITY OF RIGHT ANKLE: ICD-10-CM

## 2023-09-28 RX ORDER — OXYCODONE HYDROCHLORIDE 5 MG/1
5 TABLET ORAL 4 TIMES DAILY
Qty: 28 TABLET | Refills: 0 | Status: SHIPPED | OUTPATIENT
Start: 2023-09-28 | End: 2023-10-06

## 2023-10-01 ENCOUNTER — MYC MEDICAL ADVICE (OUTPATIENT)
Dept: CARDIOLOGY | Facility: CLINIC | Age: 63
End: 2023-10-01
Payer: MEDICARE

## 2023-10-01 DIAGNOSIS — I10 HYPERTENSION GOAL BP (BLOOD PRESSURE) < 140/90: ICD-10-CM

## 2023-10-02 RX ORDER — DOXAZOSIN 8 MG/1
8 TABLET ORAL AT BEDTIME
Qty: 90 TABLET | Refills: 1 | Status: SHIPPED | OUTPATIENT
Start: 2023-10-02 | End: 2024-02-12

## 2023-10-05 ENCOUNTER — MYC MEDICAL ADVICE (OUTPATIENT)
Dept: FAMILY MEDICINE | Facility: CLINIC | Age: 63
End: 2023-10-05
Payer: MEDICARE

## 2023-10-05 DIAGNOSIS — S86.011A RUPTURE OF RIGHT ACHILLES TENDON, INITIAL ENCOUNTER: ICD-10-CM

## 2023-10-05 DIAGNOSIS — M79.671 INTRACTABLE RIGHT HEEL PAIN: ICD-10-CM

## 2023-10-05 DIAGNOSIS — G89.4 CHRONIC PAIN SYNDROME: ICD-10-CM

## 2023-10-05 DIAGNOSIS — M65.28 CALCIFIC ACHILLES TENDINITIS: ICD-10-CM

## 2023-10-05 DIAGNOSIS — M25.571 CHRONIC PAIN OF RIGHT ANKLE: ICD-10-CM

## 2023-10-05 DIAGNOSIS — M21.6X1 ACQUIRED CALCANEUS DEFORMITY OF RIGHT ANKLE: ICD-10-CM

## 2023-10-05 DIAGNOSIS — Z98.890 H/O FOOT SURGERY: ICD-10-CM

## 2023-10-05 DIAGNOSIS — G89.29 CHRONIC PAIN OF RIGHT ANKLE: ICD-10-CM

## 2023-10-05 DIAGNOSIS — G89.18 ACUTE POST-OPERATIVE PAIN: ICD-10-CM

## 2023-10-06 DIAGNOSIS — E11.42 TYPE 2 DIABETES MELLITUS WITH DIABETIC POLYNEUROPATHY, WITH LONG-TERM CURRENT USE OF INSULIN (H): ICD-10-CM

## 2023-10-06 DIAGNOSIS — Z79.4 TYPE 2 DIABETES MELLITUS WITH DIABETIC POLYNEUROPATHY, WITH LONG-TERM CURRENT USE OF INSULIN (H): ICD-10-CM

## 2023-10-06 RX ORDER — OXYCODONE HYDROCHLORIDE 5 MG/1
5 TABLET ORAL 3 TIMES DAILY
Qty: 21 TABLET | Refills: 0 | Status: SHIPPED | OUTPATIENT
Start: 2023-10-06 | End: 2023-10-12

## 2023-10-11 ENCOUNTER — MYC MEDICAL ADVICE (OUTPATIENT)
Dept: FAMILY MEDICINE | Facility: CLINIC | Age: 63
End: 2023-10-11
Payer: MEDICARE

## 2023-10-11 DIAGNOSIS — G89.4 CHRONIC PAIN SYNDROME: ICD-10-CM

## 2023-10-11 DIAGNOSIS — M65.28 CALCIFIC ACHILLES TENDINITIS: ICD-10-CM

## 2023-10-11 DIAGNOSIS — M79.671 INTRACTABLE RIGHT HEEL PAIN: ICD-10-CM

## 2023-10-11 DIAGNOSIS — G89.29 CHRONIC PAIN OF RIGHT ANKLE: ICD-10-CM

## 2023-10-11 DIAGNOSIS — M25.571 CHRONIC PAIN OF RIGHT ANKLE: ICD-10-CM

## 2023-10-11 DIAGNOSIS — M21.6X1 ACQUIRED CALCANEUS DEFORMITY OF RIGHT ANKLE: ICD-10-CM

## 2023-10-11 DIAGNOSIS — S86.011A RUPTURE OF RIGHT ACHILLES TENDON, INITIAL ENCOUNTER: ICD-10-CM

## 2023-10-11 DIAGNOSIS — G89.18 ACUTE POST-OPERATIVE PAIN: ICD-10-CM

## 2023-10-11 DIAGNOSIS — Z98.890 H/O FOOT SURGERY: ICD-10-CM

## 2023-10-12 RX ORDER — OXYCODONE HYDROCHLORIDE 5 MG/1
5 TABLET ORAL 3 TIMES DAILY
Qty: 42 TABLET | Refills: 0 | Status: SHIPPED | OUTPATIENT
Start: 2023-10-12 | End: 2023-10-25

## 2023-10-25 ENCOUNTER — MYC MEDICAL ADVICE (OUTPATIENT)
Dept: FAMILY MEDICINE | Facility: CLINIC | Age: 63
End: 2023-10-25
Payer: MEDICARE

## 2023-10-25 DIAGNOSIS — M21.6X1 ACQUIRED CALCANEUS DEFORMITY OF RIGHT ANKLE: ICD-10-CM

## 2023-10-25 DIAGNOSIS — M65.28 CALCIFIC ACHILLES TENDINITIS: ICD-10-CM

## 2023-10-25 DIAGNOSIS — M79.671 INTRACTABLE RIGHT HEEL PAIN: ICD-10-CM

## 2023-10-25 DIAGNOSIS — G89.4 CHRONIC PAIN SYNDROME: ICD-10-CM

## 2023-10-25 DIAGNOSIS — S86.011A RUPTURE OF RIGHT ACHILLES TENDON, INITIAL ENCOUNTER: ICD-10-CM

## 2023-10-25 DIAGNOSIS — Z98.890 H/O FOOT SURGERY: ICD-10-CM

## 2023-10-25 DIAGNOSIS — G89.18 ACUTE POST-OPERATIVE PAIN: ICD-10-CM

## 2023-10-25 DIAGNOSIS — M25.571 CHRONIC PAIN OF RIGHT ANKLE: ICD-10-CM

## 2023-10-25 DIAGNOSIS — G89.29 CHRONIC PAIN OF RIGHT ANKLE: ICD-10-CM

## 2023-10-25 RX ORDER — OXYCODONE HYDROCHLORIDE 5 MG/1
5 TABLET ORAL 3 TIMES DAILY
Qty: 42 TABLET | Refills: 0 | Status: SHIPPED | OUTPATIENT
Start: 2023-10-25 | End: 2023-11-07

## 2023-10-25 NOTE — TELEPHONE ENCOUNTER
Team:     1.  See mychart please coordinate visit for pain management. Ok to use candy, jw use only just not on wed, virtual spots or other green spots next 2-3 weeks.     2.  Can you call his [facility] pharmacy and ask them to send me the certificate of medical necessity form (I believe that is the name) for his continuous blood sugar monitor that I need to do for a prior auth?     Joel Wegener,MD

## 2023-10-25 NOTE — TELEPHONE ENCOUNTER
RN called [facility].  [facility] states that they do not have any forms.  RN asked where it is supposed to come from or if they have a Medicare department?  [facility] representative states that they do not have forms to send nor do they have a Medicare department.  All documentation comes from clinics.    Roslyn HUERTAS RN

## 2023-10-26 NOTE — TELEPHONE ENCOUNTER
MTM team, are you able to provide guidance on what information I need to submit with [facility] prescription ?     Regards, Joel Wegener,MD

## 2023-10-28 DIAGNOSIS — M54.17 LUMBOSACRAL RADICULOPATHY AT S1: ICD-10-CM

## 2023-10-28 DIAGNOSIS — J45.40 MODERATE PERSISTENT ASTHMA WITHOUT COMPLICATION: ICD-10-CM

## 2023-10-30 RX ORDER — GABAPENTIN 300 MG/1
CAPSULE ORAL
Qty: 90 CAPSULE | Refills: 1 | Status: SHIPPED | OUTPATIENT
Start: 2023-10-30 | End: 2024-02-05

## 2023-10-30 RX ORDER — ALBUTEROL SULFATE 90 UG/1
AEROSOL, METERED RESPIRATORY (INHALATION)
Qty: 18 G | Refills: 1 | Status: SHIPPED | OUTPATIENT
Start: 2023-10-30 | End: 2023-12-19

## 2023-10-30 NOTE — TELEPHONE ENCOUNTER
Refill request    Medication: gabapentin (NEURONTIN) 300 MG capsule     Sig: TAKE 1 CAPSULE BY MOUTH IN THE AFTERNOON AND 2 CAPSULES AT BEDTIME     Dispensed: 90  Refills: 1  SOLD to the pt on: 9/8/23     Last clinic appointment: 5/4/23  Next clinic appointment: not scheduled    Last Drug Screen Collected: not on file  Controlled Substance Agreement signed: not on file      Preferred pharmacy:    [id number] - [place], [place] - [address]       Refill request routed to the provider to review.

## 2023-11-06 ENCOUNTER — OFFICE VISIT (OUTPATIENT)
Dept: ORTHOPEDICS | Facility: CLINIC | Age: 63
End: 2023-11-06
Payer: MEDICARE

## 2023-11-06 DIAGNOSIS — G89.29 CHRONIC PAIN OF LEFT THUMB: Primary | ICD-10-CM

## 2023-11-06 DIAGNOSIS — M79.645 CHRONIC PAIN OF LEFT THUMB: Primary | ICD-10-CM

## 2023-11-06 PROCEDURE — 99024 POSTOP FOLLOW-UP VISIT: CPT | Performed by: STUDENT IN AN ORGANIZED HEALTH CARE EDUCATION/TRAINING PROGRAM

## 2023-11-06 NOTE — PROGRESS NOTES
Orthopaedic Surgery Hand Clinic Progress Note    Patient Name: Erwin Aguilar  MRN: [id number]  : 1960  Date: 2023    Date of Surgery: 23    Surgery Performed:   1) Left in-situ cubital tunnel release  2) Left open carpal tunnel release  3) Left open trigger thumb release       Subjective:  Mr. Erwin Aguilar is a 63 year old male who returns just over 2 months s/p above. Numbness/tingling has completely resolved. He continues to have firmness and tenderness adjacent to the carpal tunnel incision. This is tender with pressure as well as with gripping and playing his guitar. He continues to have pain along the thenar eminence, at the MCP of the thumb, and at the volar A1 pulley despite trigger thumb release. His thumb has been problematic since his fall in .  He does endorse a history of a remote injury of his left thumb.    Objective:  There were no vitals taken for this visit.    General: alert, appropriate, NAD  HEENT: NC/AT  CV: RRR by pulse  Pulm: Unlabored on RA  MSK:  Fullness and palpable scarring at the carpal tunnel and trigger thumb incisions  Tenderness over the pillars.  Tenderness at the A1 pulley of the thumb.  Diminished active flexion at the IP joint  Mild tenderness over the MCP joint, no instability, chronic deformity  Intact EPL, FPL, hand intrinsics  Sensation is intact to light touch median, radial, ulnar nerve distributions  Warm well-perfused, capillary fill less than 2 seconds      Imaging:  None new.  X-rays of the left hand 2023 demonstrates mild volar subluxation of the proximal phalanx.  There is a well-corticated ossific body over the metacarpal head concerning for old fracture.    Assessment/Plan:  63-year-old male status post left in situ cubital tunnel release, left open carpal tunnel release, left trigger thumb release 2023.    Patient has developed pillar pain at the carpal tunnel.  I advised that this is normal and happens in a minority of patients  but will always resolve within the year.    I have recommended referral to hand therapy to work on scar massage, desensitization of the incisions and tendon gliding of the left thumb.  Referral has been placed    Given his persistent thumb issues after his fall in June, I have recommended an MRI of the left thumb to evaluate the flexor tendons and metacarpal phalangeal joint.    Follow-up with me after MRI to discuss results.  The patient voiced understanding and agreement.  All questions answered.    Deny Dixon MD    Hand & Upper Extremity Surgery  Department of Orthopedic Surgery  [facility]

## 2023-11-06 NOTE — LETTER
2023         RE: Erwin Aguilar  [address]  [place] [address]        Dear Colleague,    Thank you for referring your patient, Erwin Aguilar, to the [facility] [place]. Please see a copy of my visit note below.    Orthopaedic Surgery Hand Clinic Progress Note    Patient Name: Erwin Aguilar  MRN: [id number]  : 1960  Date: 2023    Date of Surgery: 23    Surgery Performed:   1) Left in-situ cubital tunnel release  2) Left open carpal tunnel release  3) Left open trigger thumb release       Subjective:  Mr. Erwin Aguilar is a 63 year old male who returns just over 2 months s/p above. Numbness/tingling has completely resolved. He continues to have firmness and tenderness adjacent to the carpal tunnel incision. This is tender with pressure as well as with gripping and playing his guitar. He continues to have pain along the thenar eminence, at the MCP of the thumb, and at the volar A1 pulley despite trigger thumb release. His thumb has been problematic since his fall in .  He does endorse a history of a remote injury of his left thumb.    Objective:  There were no vitals taken for this visit.    General: alert, appropriate, NAD  HEENT: NC/AT  CV: RRR by pulse  Pulm: Unlabored on RA  MSK:  Fullness and palpable scarring at the carpal tunnel and trigger thumb incisions  Tenderness over the pillars.  Tenderness at the A1 pulley of the thumb.  Diminished active flexion at the IP joint  Mild tenderness over the MCP joint, no instability, chronic deformity  Intact EPL, FPL, hand intrinsics  Sensation is intact to light touch median, radial, ulnar nerve distributions  Warm well-perfused, capillary fill less than 2 seconds      Imaging:  None new.  X-rays of the left hand 2023 demonstrates mild volar subluxation of the proximal phalanx.  There is a well-corticated ossific body over the metacarpal head concerning for old  fracture.    Assessment/Plan:  63-year-old male status post left in situ cubital tunnel release, left open carpal tunnel release, left trigger thumb release 8/31/2023.    Patient has developed pillar pain at the carpal tunnel.  I advised that this is normal and happens in a minority of patients but will always resolve within the year.    I have recommended referral to hand therapy to work on scar massage, desensitization of the incisions and tendon gliding of the left thumb.  Referral has been placed    Given his persistent thumb issues after his fall in June, I have recommended an MRI of the left thumb to evaluate the flexor tendons and metacarpal phalangeal joint.    Follow-up with me after MRI to discuss results.  The patient voiced understanding and agreement.  All questions answered.    Deny Dixon MD    Hand & Upper Extremity Surgery  Department of Orthopedic Surgery  [facility]

## 2023-11-06 NOTE — NURSING NOTE
Reason For Visit:   Chief Complaint   Patient presents with    Surgical Followup     Follow up on left in-situ cubital tunnel release, left open carpal tunnel release, and left thumb open trigger finger release DOS 8/31/23       Primary MD: Wegener, Joel Daniel Irwin  Ref. MD: real    Age: 63 year old    ?  No      There were no vitals taken for this visit.      Pain Assessment  Patient Currently in Pain: Yes  0-10 Pain Scale: 7  Primary Pain Location: Wrist (left elbow, wrist, and thumb)  Pain Descriptors: Constant, Sore, Aching, Discomfort, Other (comment) (weakness)    Hand Dominance Evaluation  Hand Dominance: Right          QuickDASH Assessment      11/5/2023     5:34 PM   QuickDASH Main   1. Open a tight or new jar Severe difficulty   2. Do heavy household chores (e.g., wash walls, floors) Severe difficulty   3. Carry a shopping bag or briefcase Severe difficulty   4. Wash your back Unable   5. Use a knife to cut food Moderate difficulty   6. Recreational activities in which you take some force or impact through your arm, shoulder or hand (e.g., golf, hammering, tennis, etc.) Unable to answer   7. During the past week, to what extent has your arm, shoulder or hand problem interfered with your normal social activities with family, friends, neighbours or groups Moderately   8. During the past week, were you limited in your work or other regular daily activities as a result of your arm, shoulder or hand problem Moderately limited   9. Arm, shoulder or hand pain Severe   10.Tingling (pins and needles) in your arm,shoulder or hand Mild   11. During the past week, how much difficulty have you had sleeping because of the pain in your arm, shoulder or hand Moderate difficulty   Quickdash Ability Score 54.55          Current Outpatient Medications   Medication Sig Dispense Refill    ACE/ARB NOT PRESCRIBED, INTENTIONAL, ACE & ARB not prescribed due to Allergy      albuterol (PROAIR HFA/PROVENTIL HFA/VENTOLIN  HFA) 108 (90 Base) MCG/ACT inhaler INHALE 2 PUFFS BY MOUTH EVERY 6 HOURS AS NEEDED FOR SHORTNESS OF BREATH OR WHEEZING 18 g 1    apixaban ANTICOAGULANT (ELIQUIS ANTICOAGULANT) 5 MG tablet Take 1 tablet (5 mg) by mouth 2 times daily 180 tablet 1    baclofen (LIORESAL) 10 MG tablet TAKE 2 TABLETS BY MOUTH 3 TIMES A  tablet 3    Continuous Blood Gluc  (FREESTYLE CACHORRO 2 READER) GASTON USE TO READ BLOOD SUGARS AS PER 'S INSTRUCTIONS 1 each 0    Continuous Blood Gluc Sensor (FREESTYLE CACHORRO 2 SENSOR) MISC CHANGE EVERY 14 DAYS 1 each 5    doxazosin (CARDURA) 8 MG tablet Take 1 tablet (8 mg) by mouth At Bedtime 90 tablet 1    gabapentin (NEURONTIN) 300 MG capsule TAKE 1 CAPSULE BY MOUTH IN THE AFTERNOON AND 2 CAPSULES AT BEDTIME 90 capsule 1    GLOBAL EASE INJECT PEN NEEDLES 32G X 4 MM miscellaneous USE AS DIRECTED EVERY  each 1    insulin glargine (LANTUS PEN) 100 UNIT/ML pen Inject 26 Units Subcutaneous At Bedtime 30 mL 3    medical cannabis (Patient's own supply) See Admin Instructions (The purpose of this order is to document that the patient reports taking medical cannabis.  This is not a prescription, and is not used to certify that the patient has a qualifying medical condition.)   Flower - PRN      oxyCODONE (ROXICODONE) 5 MG tablet Take 1 tablet (5 mg) by mouth 3 times daily for 14 days 42 tablet 0    promethazine (PHENERGAN) 25 MG tablet TAKE 1 TABLET BY MOUTH 3 TIMES A DAY AS NEEDED FOR NAUSEA 270 tablet 1    propranolol (INDERAL) 40 MG tablet Take 2 tablets (80 mg) by mouth 2 times daily 360 tablet 3    rosuvastatin (CRESTOR) 20 MG tablet Take 1 tablet (20 mg) by mouth daily 90 tablet 3    sertraline (ZOLOFT) 100 MG tablet TAKE 2 TABLETS BY MOUTH DAILY (Patient taking differently: Take 200 mg by mouth daily TAKE 2 TABLETS BY MOUTH DAILY) 180 tablet 3    triamterene-HCTZ (DYAZIDE) 37.5-25 MG capsule Take 1 capsule by mouth every morning 90 capsule 3       Allergies   Allergen  Reactions    Levaquin Anaphylaxis and Rash     Swelling in lip and tongue felt thick    Lisinopril Anaphylaxis     Swollen tongue; inability to swallow; drooling; hives; swollen face, neck, angioedema    Acetaminophen      Hx of cirrhosis     Amlodipine      Swelling, hives, possible angioedema      Morphine      b/p dropped and arms went numb  Hypotension    Quinolones Hives    Spironolactone Unknown     Pt believes himself to be allergic to it; cannot find his old records of this.-c     Bactrim [Sulfamethoxazole-Trimethoprim] Rash       Chasidy Trujillo, ATC

## 2023-11-07 ENCOUNTER — VIRTUAL VISIT (OUTPATIENT)
Dept: FAMILY MEDICINE | Facility: CLINIC | Age: 63
End: 2023-11-07
Payer: MEDICARE

## 2023-11-07 DIAGNOSIS — M65.28 CALCIFIC ACHILLES TENDINITIS: ICD-10-CM

## 2023-11-07 DIAGNOSIS — S86.011A RUPTURE OF RIGHT ACHILLES TENDON, INITIAL ENCOUNTER: ICD-10-CM

## 2023-11-07 DIAGNOSIS — L97.313 SKIN ULCER OF RIGHT ANKLE WITH NECROSIS OF MUSCLE (H): Primary | ICD-10-CM

## 2023-11-07 DIAGNOSIS — M25.571 CHRONIC PAIN OF RIGHT ANKLE: ICD-10-CM

## 2023-11-07 DIAGNOSIS — G89.18 ACUTE POST-OPERATIVE PAIN: ICD-10-CM

## 2023-11-07 DIAGNOSIS — Z98.890 H/O FOOT SURGERY: ICD-10-CM

## 2023-11-07 DIAGNOSIS — G89.29 CHRONIC PAIN OF RIGHT ANKLE: ICD-10-CM

## 2023-11-07 DIAGNOSIS — M21.6X1 ACQUIRED CALCANEUS DEFORMITY OF RIGHT ANKLE: ICD-10-CM

## 2023-11-07 DIAGNOSIS — M79.671 INTRACTABLE RIGHT HEEL PAIN: ICD-10-CM

## 2023-11-07 DIAGNOSIS — G89.4 CHRONIC PAIN SYNDROME: ICD-10-CM

## 2023-11-07 PROCEDURE — 99214 OFFICE O/P EST MOD 30 MIN: CPT | Mod: 95 | Performed by: FAMILY MEDICINE

## 2023-11-07 RX ORDER — OXYCODONE HYDROCHLORIDE 5 MG/1
5 TABLET ORAL 4 TIMES DAILY PRN
Qty: 120 TABLET | Refills: 0 | Status: SHIPPED | OUTPATIENT
Start: 2024-01-07 | End: 2023-11-10

## 2023-11-07 RX ORDER — OXYCODONE HYDROCHLORIDE 5 MG/1
5 TABLET ORAL 4 TIMES DAILY PRN
Qty: 120 TABLET | Refills: 0 | Status: SHIPPED | OUTPATIENT
Start: 2023-12-07 | End: 2023-11-07

## 2023-11-07 RX ORDER — OXYCODONE HYDROCHLORIDE 5 MG/1
5 TABLET ORAL 4 TIMES DAILY PRN
Qty: 120 TABLET | Refills: 0 | Status: SHIPPED | OUTPATIENT
Start: 2023-11-07 | End: 2023-11-07

## 2023-11-07 ASSESSMENT — ASTHMA QUESTIONNAIRES: ACT_TOTALSCORE: 22

## 2023-11-07 ASSESSMENT — PATIENT HEALTH QUESTIONNAIRE - PHQ9
10. IF YOU CHECKED OFF ANY PROBLEMS, HOW DIFFICULT HAVE THESE PROBLEMS MADE IT FOR YOU TO DO YOUR WORK, TAKE CARE OF THINGS AT HOME, OR GET ALONG WITH OTHER PEOPLE: SOMEWHAT DIFFICULT
SUM OF ALL RESPONSES TO PHQ QUESTIONS 1-9: 2
SUM OF ALL RESPONSES TO PHQ QUESTIONS 1-9: 2

## 2023-11-07 ASSESSMENT — PAIN SCALES - PAIN ENJOYMENT GENERAL ACTIVITY SCALE (PEG)
AVG_PAIN_PASTWEEK: 8
PEG_TOTALSCORE: 8.67
INTERFERED_ENJOYMENT_LIFE: 9
INTERFERED_GENERAL_ACTIVITY: 9

## 2023-11-07 NOTE — PROGRESS NOTES
Erwin is a 63 year old who is being evaluated via a billable video visit.      How would you like to obtain your AVS? MyChart  If the video visit is dropped, the invitation should be resent by: Text to cell phone: [phone]  Will anyone else be joining your video visit? No          Assessment & Plan   Continues to struggle with severe ankle pain.  Has been able to decrease oxycodone to 10mg total many days however depending on activing sometimes needs more.  Does continue to find it immensely helpful especially after being more active riding electric bike, etc.     Looked into suboxone /buprenorphine in past but very expensive.  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  opiod doses have not escalated.  He is concerned that pain may increase over time and/or losing effectiveness of oxycodone.     Discussed alternatives such as tramadol which tend to have higher side effects in seniors.     Given all this I feel if we keep oxycodone in reasonable range I.e. 10-20 mg/day it would be unlikely for it to lose it's effectiveness and my opinion is that although the pain may increase over time I expect this to happen very slowly if at all.     Given this contracted to up to four total 5 mg oxycodone/day. Will send in three one month prescriptions.     Follow up scheduled for January already.  Reviewed concepts of csa today and can formally sign at that time.     No suspicious activity on [place] rx monitoring database     Reasons for pain as below.     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                BMI:   Estimated body  "mass index is 34.3 kg/m  as calculated from the following:    Height as of 8/31/23: 1.702 m (5' 7\").    Weight as of 8/31/23: 99.3 kg (219 lb).           Joel Daniel Wegener, MD  [facility]PF[facility]UCHE Hood is a 63 year old, presenting for the following health issues:  Pain      Pain    History of Present Illness       Reason for visit:  Medications    He eats 2-3 servings of fruits and vegetables daily.He consumes 0 sweetened beverage(s) daily.He exercises with enough effort to increase his heart rate 10 to 19 minutes per day.  He exercises with enough effort to increase his heart rate 4 days per week.   He is taking medications regularly.               Review of Systems         Objective    Vitals - Patient Reported  Weight (Patient Reported): 94.3 kg (208 lb)        Physical Exam   GENERAL: Healthy, alert and no distress  EYES: Eyes grossly normal to inspection.  No discharge or erythema, or obvious scleral/conjunctival abnormalities.  RESP: No audible wheeze, cough, or visible cyanosis.  No visible retractions or increased work of breathing.    SKIN: Visible skin clear. No significant rash, abnormal pigmentation or lesions.  NEURO: Cranial nerves grossly intact.  Mentation and speech appropriate for age.  PSYCH: Mentation appears normal, affect normal/bright, judgement and insight intact, normal speech and appearance well-groomed.                Video-Visit Details    Type of service:  Video Visit   Video Start Time:  11:46  Video End Time:12:01 PM    Originating Location (pt. Location): Home    Distant Location (provider location):  On-site  Platform used for Video Visit: Kameron"

## 2023-11-07 NOTE — PATIENT INSTRUCTIONS
Prescription 20mg/day oxycodone in 5 mg tabs one month supply x three months, follow up three months

## 2023-11-07 NOTE — COMMUNITY RESOURCES LIST (ENGLISH)
11/07/2023   [facility]  N/A  For questions about this resource list or additional care needs, please contact your primary care clinic or care manager.  Phone: [phone]   Email: N/A   Address: [address] [place] [id number]   Hours: N/A        Transportation       Free or low-cost transportation  1  [employer] Distance: 3.46 miles      In-Person   [address] [place] [address]  Language: English, Angolan  Hours: Mon 9:00 AM - 5:00 PM , Tue 9:30 AM - 7:00 PM , Wed 9:00 AM - 5:00 PM , Thu 9:30 AM - 7:00 PM , Fri 9:00 AM - 5:00 PM  Fees: Free   Phone: (785) 422-5331 Email: contactus@[employer] Website: http://www.[employer]     2  [employer]  [facility] Circulator Bus Distance: 4.38 miles      In-Person   [address] [place] [address]  Language: English  Hours: Tue 9:00 AM - 2:00 PM  Fees: Self Pay   Phone: (335) 625-7859 Email: info@[employer] Website: http://www.[employer].org/     Transportation to medical appointments  3  [employer] [facility] [employer] - Non-Emergency Medical Transportation Distance: 1.18 miles      In-Person   [address] [place] [address]  Language: English  Hours: Mon - Fri 8:00 AM - 4:00 PM Appt. Only  Fees: Self Pay   Phone: (597) 796-4404 Website: http://www.[facility].org/Medical-Services/Emergency-medical-services/Non-emergency-transportation/     4  [employer] Ride Distance: 4.64 miles      In-Person   [address] [address]  Language: English  Hours: Mon - Thu 6:00 AM - 6:00 PM , Fri 6:00 AM - 5:00 PM  Fees: Insurance, Self Pay   Phone: (635) 826-2458 Email: office@[employer] Website: https://www.[employer]/          Important Numbers & Websites       Emergency Services   911  City Services   311  Poison Control   (524) 105-9795  Suicide Prevention Lifeline   (430) 706-3715 (TALK)  Child Abuse Hotline   (998) 983-6778 (4-A-Child)  Sexual Assault Hotline   (845) 460-7936  (HOPE)  National Runaway Safeline   (239) 165-2827 (RUNAWAY)  All-Options Talkline   (165) 109-9424  Substance Abuse Referral   (859) 938-3487 (HELP)

## 2023-11-15 ENCOUNTER — THERAPY VISIT (OUTPATIENT)
Dept: OCCUPATIONAL THERAPY | Facility: CLINIC | Age: 63
End: 2023-11-15
Attending: STUDENT IN AN ORGANIZED HEALTH CARE EDUCATION/TRAINING PROGRAM
Payer: MEDICARE

## 2023-11-15 DIAGNOSIS — M65.312 TRIGGER THUMB OF LEFT HAND: ICD-10-CM

## 2023-11-15 DIAGNOSIS — Z47.89 ORTHOPEDIC AFTERCARE: Primary | ICD-10-CM

## 2023-11-15 DIAGNOSIS — G89.29 CHRONIC PAIN OF LEFT THUMB: ICD-10-CM

## 2023-11-15 DIAGNOSIS — M79.645 CHRONIC PAIN OF LEFT THUMB: ICD-10-CM

## 2023-11-15 PROCEDURE — 97110 THERAPEUTIC EXERCISES: CPT | Mod: GO | Performed by: OCCUPATIONAL THERAPIST

## 2023-11-15 PROCEDURE — 97530 THERAPEUTIC ACTIVITIES: CPT | Mod: GO | Performed by: OCCUPATIONAL THERAPIST

## 2023-11-15 PROCEDURE — 97165 OT EVAL LOW COMPLEX 30 MIN: CPT | Mod: GO | Performed by: OCCUPATIONAL THERAPIST

## 2023-11-15 PROCEDURE — 97140 MANUAL THERAPY 1/> REGIONS: CPT | Mod: GO | Performed by: OCCUPATIONAL THERAPIST

## 2023-11-15 NOTE — PROGRESS NOTES
OCCUPATIONAL THERAPY EVALUATION  Type of Visit: Evaluation    See electronic medical record for Abuse and Falls Screening details.    Subjective      Presenting condition or subjective complaint: Thumb pain, stiffness  Date of onset: 11/06/23 (Referral)    Relevant medical history:   See chart.  Long orthopedic history including fractures of the hand per patient report.  Medical history notable for spinal cord injury with subsequent cauda equina syndrome.  Has spinal stimulator in place.  Dates & types of surgery:  See chart.    Prior diagnostic imaging/testing results: X-ray (MRI upcoming)     Prior therapy history for the same diagnosis, illness or injury: No      Patient comes to therapy today for formal evaluation after undergoing left carpal tunnel release, cubital tunnel release, as well as trigger finger release of the left thumb A1 pulley.  This occurred earlier in the summer, though patient with new onset complaints of aching, orthopedic pain to the thumb and wrist as well as stiffness and weakness.  He does endorse that muscle tone and sensation continue to improve post-op.     Procedure: Post-op left in-situ cubital tunnel release, left open carpal tunnel release, left open trigger thumb release  Imaging Impression 6/4/2023: Small curvilinear calcification adjacent to the distal aspect of the first proximal phalanx at the IP joint may represent an age indeterminate nondisplaced fracture. There is an old fracture of the first metacarpal head at the MCP joint. No dislocation.     Prior Level of Function  Transfers: Independent  Ambulation: Independent  ADL: Independent  IADL: Driving, Finances, Housekeeping, Laundry, Meal preparation, Medication management, Work, Yard work    Living Environment  Social support:     Type of home: -- (Assisted/independent living facility)   Stairs to enter the home:         Ramp:     Stairs inside the home:         Help at home:    Equipment owned: Straight Cane  Per Kimmy, today will not work for [employer]. Can he be scheduled on another day or with another provider?       Indedependent/assisted lving   Employment: No Retired  Hobbies/Interests: Cycling, staying fit    Patient goals for therapy: Improved strength and mobility of the thumb/wrist.     Objective   ADDITIONAL HISTORY:  Right hand dominant  Patient reports symptoms of pain, stiffness/loss of motion, weakness/loss of strength, and edema  Transportation: Does not drive. Uses bicycle or Uber.   Currently retired    Functional Outcome Measure:   Upper Extremity Functional Index Score:  SCORE:   Column Totals: /80: 37   (A lower score indicates greater disability.)    PAIN:  Pain Level at Rest: 7/10  Pain Level with Use: 8/10  Pain Location: 1st ray, thenar eminence, volar wrist 4-5 cm proximal to CT  Pain Quality: Aching  Pain Frequency: intermittent  Pain is Worst: daytime or nighttime  Pain is Exacerbated By: Gripping, squeezing, cellphone use  Pain is Relieved By: rest  Pain Progression: Unchanged    POSTURE: Normal     EDEMA:  Mild to palpation about the thenar eminence primarily.       SCAR/WOUND:  Well-healed incisions to the left thumb A1 pulley, carpal tunnel and cubital tunnel. These are quite firm and fibrotic, especially to the A1 pulley, limiting active MCP extension.     SENSATION: WNL throughout all nerve distributions; per patient report     ROM:   Thumb ROM  Left AROM   MP Joint -41 /59   IP Joint  HE/9   Radial Abduction 42   Palmar Abduction 30   Kapandji Opposition Scale (0-10/10) 7-8/10       OBSERVATIONS/APPEARANCE:  Mild thenar, hypothenar and 1st DI atrophy, improved post-op per patient report. Mild laxity and tenderness with gentle loading of the left thumb MCP UCL, RCL. Moderate to severely painful WHAT test.        SPECIAL TESTS:   Stage of Stenosing Tenosynovitis (SST) Left   Finger  Stage I, though restricted   Stage 1:  Normal  Stage 2:  Uneven motion of tendon  Stage 3:  Triggering, clicking, catching  Stage 4:  Locking in extension or flexion; unlocked by active motion  Stage 5:  Locking in  extension or flexion; unlocked by passive motion  Stage 6:  Finger locked in extension or flexion    RESISTED TESTING:  3+/5 APL, EPB, EPL       STRENGTH:     Measured in pounds 11/15/2023 11/15/2023    Left Right   Trial 1 15# 44#     Lateral Pinch  Measured in pounds 11/15/2023 11/15/2023    Left Right   Trial 1 4# 21#     PALPATION:  TTP about the CT and A1 pulley incisions, thenar eminence, radial styloid, 1st CMC joint (left)      Assessment & Plan   CLINICAL IMPRESSIONS  Medical Diagnosis: Chronic pain of left thumb    Treatment Diagnosis: Chronic pain of left thumb, orthopedic aftercare    Impression/Assessment: Pt is a 63 year old male presenting to Occupational Therapy due to chronic pain of left thumb.  The following significant findings have been identified: Impaired activity tolerance, Impaired coordination, Impaired ROM, Impaired sensation, Impaired strength, and Pain.  These identified deficits interfere with their ability to perform self care tasks, recreational activities, household chores, driving , household mobility, community mobility, school tasks, medication management, financial management,  yard work, care of others, meal planning and preparation, and community or volunteer activities as compared to previous level of function.   Patient's limitations or Problem List includes: Pain, Decreased ROM/motion, Increased edema, Weakness, Sensory disturbance, Decreased stability, Hypermobility, Hypomobility, Decreased , Decreased pinch, Decreased coordination, Decreased dexterity, Tightness in musculature, and Adherence in connective tissue of the left wrist, hand, and thumb which interferes with the patient's ability to perform Self Care Tasks (dressing, eating, bathing, hygiene/toileting), Work Tasks, Sleep Patterns, Recreational Activities, Household Chores, and Driving  as compared to previous level of function.    Clinical Decision Making (Complexity):  Assessment of Occupational  Performance: 3-5 Performance Deficits  Occupational Performance Limitations: toileting, communication management, driving and community mobility, health management and maintenance, home establishment and management, meal preparation and cleanup, shopping, sleep, school, work, and leisure activities  Clinical Decision Making (Complexity): Low complexity    PLAN OF CARE  Treatment Interventions:  Modalities:  US  Therapeutic Exercise:  AROM, AAROM, PROM, Tendon Gliding, Blocking, Reverse Blocking, Place and Hold, Contract Relax, Extensor Tracking, Isotonics, Isometrics, and Stabilization  Neuromuscular re-education:  Nerve Gliding, Coordination/Dexterity, Sensory re-education, Desensitization, Kinesthetic Training, Proprioceptive Training, Posture, Kinesiotaping, Strain Counter Strain, Isometrics, and Stabilization  Manual Techniques:  Coordination/Dexterity, Joint mobilization, Scar mobilization, Friction massage, Myofascial release, and Manual edema mobilization  Orthotic Fabrication:  Static, Finger based, Hand based, and Forearm based  Self Care:  Self Care Tasks and Ergonomic Considerations    Long Term Goals   OT Goal 1  Goal Identifier: Goal I  Goal Description: Patient will exhibit 6# or more of lateral pinch strength for greater easy and independence with manipulation of zippers.  Rationale: In order to maximize safety and independence with performance of self-care activities  Goal Progress: New  Target Date: 01/10/24      Frequency of Treatment: 1x/week  Duration of Treatment: 8 weeks     Education Assessment: Learner/Method: Patient;No Barriers to Learning;Pictures/Video;Demonstration;Reading;Listening     Risks and benefits of evaluation/treatment have been explained.   Patient/Family/caregiver agrees with Plan of Care.     Evaluation Time:    OT Eval, Low Complexity Minutes (48370): 20    Signing Clinician: Lei Díaz OT      [facility]                                                                                    OUTPATIENT OCCUPATIONAL THERAPY      PLAN OF TREATMENT FOR OUTPATIENT REHABILITATION   Patient's Last Name, First Name, Erwin Figueroa YOB: 1960   Provider's Name   [facility]   Medical Record No.  [id number]     Onset Date: 11/06/23 (Referral) Start of Care Date: 11/15/23     Medical Diagnosis:  Chronic pain of left thumb      OT Treatment Diagnosis:  Chronic pain of left thumb, orthopedic aftercare Plan of Treatment  Frequency/Duration:1x/week/8 weeks    Certification date from 11/15/23   To 01/10/24        See note for plan of treatment details and functional goals     Lei Díaz OT                         I CERTIFY THE NEED FOR THESE SERVICES FURNISHED UNDER        THIS PLAN OF TREATMENT AND WHILE UNDER MY CARE     (Physician attestation of this document indicates review and certification of the therapy plan).              Referring Provider:  Deny Dixon    Initial Assessment  See Epic Evaluation- 11/15/23

## 2023-11-19 ENCOUNTER — MYC MEDICAL ADVICE (OUTPATIENT)
Dept: FAMILY MEDICINE | Facility: CLINIC | Age: 63
End: 2023-11-19
Payer: MEDICARE

## 2023-11-19 DIAGNOSIS — E11.9 TYPE 2 DIABETES MELLITUS WITHOUT COMPLICATION, WITH LONG-TERM CURRENT USE OF INSULIN (H): ICD-10-CM

## 2023-11-19 DIAGNOSIS — Z79.4 TYPE 2 DIABETES MELLITUS WITHOUT COMPLICATION, WITH LONG-TERM CURRENT USE OF INSULIN (H): ICD-10-CM

## 2023-11-19 DIAGNOSIS — E11.42 TYPE 2 DIABETES MELLITUS WITH DIABETIC POLYNEUROPATHY, WITH LONG-TERM CURRENT USE OF INSULIN (H): ICD-10-CM

## 2023-11-19 DIAGNOSIS — Z79.4 TYPE 2 DIABETES MELLITUS WITH DIABETIC POLYNEUROPATHY, WITH LONG-TERM CURRENT USE OF INSULIN (H): ICD-10-CM

## 2023-11-20 RX ORDER — PEN NEEDLE, DIABETIC 32GX 5/32"
NEEDLE, DISPOSABLE MISCELLANEOUS
Qty: 100 EACH | Refills: 3 | Status: SHIPPED | OUTPATIENT
Start: 2023-11-20 | End: 2024-05-22

## 2023-11-20 RX ORDER — INSULIN GLARGINE 100 [IU]/ML
INJECTION, SOLUTION SUBCUTANEOUS
Qty: 15 ML | Refills: 1 | Status: SHIPPED | OUTPATIENT
Start: 2023-11-20 | End: 2024-01-09

## 2023-11-21 NOTE — PROGRESS NOTES
Clinic Care Coordination Contact    Follow Up Progress Note   PCP referral 12/23/2020  Reason for Referral: Financial Support: Medication Affordability  and dressing cares, Patient/Caregiver Support: Resources for Support, Resources for Transportation      Additional pertinent details:  Patient is a 60M with a history of a SCI and subsequent disability. Limited ability to attend clinic appointments, also partially responsible for caring for his wife, who is also recently disabled.  He has an open, draining wound on his foot and has been unable to get dressing supplies.  Appreciate any assistance you can provide for transportation, financial counseling, and community resources to assist him in his care.          Assessment: Patient is taking the CBD oil for pain with success.  Pain is #10 and then goes down to a #2 after CBD oil.  Healing process is much better and discoloration has improved and his leg looks normal after taking the CBD oil.  Patient has a Pain Clinic appointment tomorrow to confirm the diagnosis of Chronic Regional Pain Syndrome .  Patient will also have a [employer] consult at the same time to check his nerve stimulator.  Patient states Dr Wegener will consult with Podiatry providers to see if he can weigh bear and start PT in the home   Patient is getting around with is electric bike   If pains diagnosis is confirmed as a  Chronic Regional Pain syndrome he can't have surgery.  Patient has a friend what is a surgeon at the [facility] and he can refer to a research orthopedic surgeon for possible surgery   Patient's goal is to walk again with a foot device ,cane or crutch   Patient has a poor appetite and has lost weight 202# Patients ideal weight is 205#  Patient has lost a lot of his muscle weight   Patient continues to use a knee scooter   Goals addressed this encounter:   Goals Addressed                 This Visit's Progress      Pain management (pt-stated)   40%     Goal Statement: I will  decrease my heel pain in the next 6 months   Date Goal set: 4/5/2021  Barriers: slow healing open wound  Strengths: Positive attitude   Date to Achieve By: 10/5/2021  Patient expressed understanding of goal: Yes  Action steps to achieve this goal:  1. I will future appointments with Dr Sandoval and Dr Anaya /Podiatrist /Surgeon   2. I will keep my appointment with Dr Sweet /[facility] 4/14/2021  3. I will use my electric bike to increase mobility outside   4. I will take my CBD oil  medication as directed   3. Care coordinator will follow up in 2-4 weeks              Intervention/Education provided during outreach: CC RN continues to support the patient with future questions or concerns      Outreach Frequency: 2 weeks    Plan:   Patient will call CC RN back with an update after Pain Clinic appointment tomorrow   [facility]   Echo Varghese RN, Care Coordinator   [facility] [place]   E-mail [email or web address]   [phone]    4 = No assist / stand by assistance

## 2023-11-22 ENCOUNTER — THERAPY VISIT (OUTPATIENT)
Dept: OCCUPATIONAL THERAPY | Facility: CLINIC | Age: 63
End: 2023-11-22
Attending: STUDENT IN AN ORGANIZED HEALTH CARE EDUCATION/TRAINING PROGRAM
Payer: MEDICARE

## 2023-11-22 DIAGNOSIS — Z47.89 ORTHOPEDIC AFTERCARE: ICD-10-CM

## 2023-11-22 DIAGNOSIS — G89.29 CHRONIC PAIN OF LEFT THUMB: Primary | ICD-10-CM

## 2023-11-22 DIAGNOSIS — M79.645 CHRONIC PAIN OF LEFT THUMB: Primary | ICD-10-CM

## 2023-11-22 PROCEDURE — 97760 ORTHOTIC MGMT&TRAING 1ST ENC: CPT | Mod: GO | Performed by: OCCUPATIONAL THERAPIST

## 2023-11-22 PROCEDURE — 97530 THERAPEUTIC ACTIVITIES: CPT | Mod: GO | Performed by: OCCUPATIONAL THERAPIST

## 2023-11-30 DIAGNOSIS — I10 HYPERTENSION GOAL BP (BLOOD PRESSURE) < 140/90: ICD-10-CM

## 2023-12-01 RX ORDER — PROPRANOLOL HYDROCHLORIDE 40 MG/1
80 TABLET ORAL 2 TIMES DAILY
Qty: 360 TABLET | Refills: 2 | Status: SHIPPED | OUTPATIENT
Start: 2023-12-01 | End: 2024-01-09

## 2023-12-06 ENCOUNTER — MYC MEDICAL ADVICE (OUTPATIENT)
Dept: FAMILY MEDICINE | Facility: CLINIC | Age: 63
End: 2023-12-06
Payer: MEDICARE

## 2023-12-06 DIAGNOSIS — S86.011A RUPTURE OF RIGHT ACHILLES TENDON, INITIAL ENCOUNTER: ICD-10-CM

## 2023-12-06 DIAGNOSIS — M25.571 CHRONIC PAIN OF RIGHT ANKLE: ICD-10-CM

## 2023-12-06 DIAGNOSIS — Z98.890 H/O FOOT SURGERY: ICD-10-CM

## 2023-12-06 DIAGNOSIS — G89.18 ACUTE POST-OPERATIVE PAIN: ICD-10-CM

## 2023-12-06 DIAGNOSIS — G89.29 CHRONIC PAIN OF RIGHT ANKLE: ICD-10-CM

## 2023-12-06 DIAGNOSIS — G89.4 CHRONIC PAIN SYNDROME: ICD-10-CM

## 2023-12-06 DIAGNOSIS — M21.6X1 ACQUIRED CALCANEUS DEFORMITY OF RIGHT ANKLE: ICD-10-CM

## 2023-12-06 DIAGNOSIS — M79.671 INTRACTABLE RIGHT HEEL PAIN: ICD-10-CM

## 2023-12-06 DIAGNOSIS — M65.28 CALCIFIC ACHILLES TENDINITIS: ICD-10-CM

## 2023-12-07 ENCOUNTER — MYC MEDICAL ADVICE (OUTPATIENT)
Dept: ORTHOPEDICS | Facility: CLINIC | Age: 63
End: 2023-12-07

## 2023-12-07 DIAGNOSIS — M79.645 CHRONIC PAIN OF LEFT THUMB: Primary | ICD-10-CM

## 2023-12-07 DIAGNOSIS — G89.29 CHRONIC PAIN OF LEFT THUMB: Primary | ICD-10-CM

## 2023-12-07 DIAGNOSIS — Z47.89 ORTHOPEDIC AFTERCARE: ICD-10-CM

## 2023-12-07 RX ORDER — OXYCODONE HYDROCHLORIDE 5 MG/1
5 TABLET ORAL 4 TIMES DAILY PRN
Qty: 120 TABLET | Refills: 0 | Status: SHIPPED | OUTPATIENT
Start: 2023-12-10 | End: 2024-01-09

## 2023-12-11 ENCOUNTER — TELEPHONE (OUTPATIENT)
Dept: GASTROENTEROLOGY | Facility: CLINIC | Age: 63
End: 2023-12-11
Payer: MEDICARE

## 2023-12-11 DIAGNOSIS — K70.30 ALCOHOLIC CIRRHOSIS OF LIVER WITHOUT ASCITES (H): Primary | ICD-10-CM

## 2023-12-11 RX ORDER — BISACODYL 5 MG/1
TABLET, DELAYED RELEASE ORAL
Qty: 4 TABLET | Refills: 0 | Status: CANCELLED | OUTPATIENT
Start: 2023-12-11

## 2023-12-11 NOTE — TELEPHONE ENCOUNTER
Pre visit planning completed.      Procedure details:    Patient scheduled for Colonoscopy/Upper endoscopy (EGD) on 12.28.23.     Arrival time: 0815. Procedure time 0945    Pre op exam needed? N/A    Facility location: [facility]; [address], [place] [address]    Sedation type: MAC    Indication for procedure: Alcoholic cirrhosis of liver without ascites (H)       Chart review:     Electronic implanted devices? No    Recent diagnosis of diverticulitis within the last 6 weeks? No    Diabetic? Yes. See medication holding recommendations     Diabetic medication HOLDING recommendations: (if applicable)  Oral diabetic medications: No  Diabetic injectables: No  Insulin: Yes. Consult with managing provider       Medication review:    Anticoagulants? Yes Apixaban (Eliquis): Recommended HOLD 2 days before procedure.  Consult with your managing provider.    NSAIDS? No NSAID medications per patient's medication list.  RN will verify with pre-assessment call.    Other medication HOLDING recommendations:  Phentermine: HOLD 7 days before procedure.  Cannabis/Marijuana: Stop night before procedure.      Prep for procedure:     Verify bowel habits with patient, has a history of chronic pain, Oxycodone is on medication list.     Bowel prep recommendation: Standard Golytely     Due to:  standard bowel prep.       Prep instructions were not sent as bowel habits need to be discussed to determine appropriate prep. Bowel prep was not sent to Pharmacy.         Krys Shah RN  Endoscopy Procedure Pre Assessment RN  [phone] option 4

## 2023-12-11 NOTE — TELEPHONE ENCOUNTER
Attempted to contact patient in order to complete pre assessment questions.     No answer. Left message to return call to [phone] option 4    Missed call communication sent via [employer].    Krys Shah RN  Endoscopy Procedure Pre-Assessment RN

## 2023-12-13 ENCOUNTER — TELEPHONE (OUTPATIENT)
Dept: GASTROENTEROLOGY | Facility: CLINIC | Age: 63
End: 2023-12-13
Payer: MEDICARE

## 2023-12-13 ENCOUNTER — MYC MEDICAL ADVICE (OUTPATIENT)
Dept: GASTROENTEROLOGY | Facility: CLINIC | Age: 63
End: 2023-12-13
Payer: MEDICARE

## 2023-12-13 NOTE — TELEPHONE ENCOUNTER
----- Message from Corinne Kliber, RN sent at 12/13/2023  2:10 PM CST -----  Regarding: RE: Cancel Colonoscopy  Hello there,    I offered that to him as well, he doesn't want the colonoscopy right now.  He says he will call back at a later time to reschedule the colon.    Thank you  Corinne Kliber, RN  Endoscopy Procedure Pre Assessment RN  [phone] option 4    ----- Message -----  From: Darren Hood  Sent: 12/13/2023   9:35 AM CST  To: Corinne Kliber, RN; Endoscopy Scheduling Pool  Subject: RE: Cancel Colonoscopy                           If he's scheduled on a MAC block, we can change it to general if that's what he's requesting. Was it just a sedation issue or he just doesn't want the colonoscopy right now?    Cooper HUERTAS   ----- Message -----  From: Kliber, Corinne, RN  Sent: 12/13/2023   9:23 AM CST  To: Endoscopy Scheduling Pool  Subject: Cancel Colonoscopy                               Patient is scheduled for a Colonoscopy/Upper endoscopy (EGD)  Date of procedure: 12/28/2023 / UPU    Sedation type: MAC    During PA, the patient states he would like to cancel colonoscopy.      He will call back to reschedule with general anesthesia.  He will keep scheduled EGD with MAC.      Thank you,   Corinne Kliber, RN  Endoscopy Procedure Pre Assessment RN  [phone] option 4

## 2023-12-13 NOTE — TELEPHONE ENCOUNTER
Per Kathleen Antonio CHT on 12-12-23:  Spoke with patient. Discussed that we could continue to strategies regarding thumb function, exercise, orthosis needs. At this time, he would like to continue with his home program. He has clinic contact info and states that he may return in Jan. 2024.

## 2023-12-13 NOTE — TELEPHONE ENCOUNTER
During PA, patient expressed extremely high levels of anxiety about procedure.  Writer explained procedures, answered questions and tried to reassure patient.      The patient continued to express high levels of anxiety and agitation.  The patient states he knows he needs to get upper endoscopy completed due to varices, however, feels better if he can cancel colonoscopy.      Writer reassured patient the colonoscopy can be cancelled he can reschedule when he is ready with general anesthesia if he chooses.  The pt is satisfied with this plan.    Pt denies taking phentermine.  Writer unable to locate in medication list.      Pt states he has a spinal stimulator.  Advised to bring remote so he can turn off during procedure if directed.    Msg sent to scheduling to cancel colonoscopy.    Pre assessment completed for upcoming procedure.    Procedure details:    Arrival time and facility location reviewed.    Pre op exam needed? N/A    Designated  policy reviewed. Instructed to have someone stay 24 hours post procedure.     COVID policy reviewed.      Medication review:    Medications reviewed. Please see supporting documentation below. Holding recommendations discussed (if applicable).       Prep for procedure:     Reviewed procedure prep instructions.     Patient verbalized understanding and had no questions or concerns at this time.        Corinne Kliber, RN  Endoscopy Procedure Pre Assessment RN  [phone] option 4

## 2023-12-19 ENCOUNTER — TELEPHONE (OUTPATIENT)
Dept: ORTHOPEDICS | Facility: CLINIC | Age: 63
End: 2023-12-19
Payer: MEDICARE

## 2023-12-19 DIAGNOSIS — G89.29 CHRONIC PAIN OF LEFT THUMB: Primary | ICD-10-CM

## 2023-12-19 DIAGNOSIS — M79.645 CHRONIC PAIN OF LEFT THUMB: Primary | ICD-10-CM

## 2023-12-19 DIAGNOSIS — J45.40 MODERATE PERSISTENT ASTHMA WITHOUT COMPLICATION: ICD-10-CM

## 2023-12-19 RX ORDER — ALBUTEROL SULFATE 90 UG/1
AEROSOL, METERED RESPIRATORY (INHALATION)
Qty: 18 G | Refills: 1 | Status: SHIPPED | OUTPATIENT
Start: 2023-12-19 | End: 2024-01-09

## 2023-12-19 NOTE — TELEPHONE ENCOUNTER
Patient requests IV sedation for MRI of his left thumb w/o contrast.   He states that he cannot lay on his stomach if he is awake due to past spinal cord injury and will need to be out and unaware of what is happening.      Discussed that we offer oral valium, but he states this will not be effective enough.  He is already on daily Oxycodone, Baclofen, Gabapentin and Medical cannabis and this is his baseline and doesn't slow him down at all.   Will request an order from Dr Dixon for MRI with sedation per patient request.   Patient verbalized understanding. Will inform patient of response. Liz Sánchez RN

## 2023-12-20 ENCOUNTER — PATIENT OUTREACH (OUTPATIENT)
Dept: GASTROENTEROLOGY | Facility: CLINIC | Age: 63
End: 2023-12-20
Payer: MEDICARE

## 2023-12-20 NOTE — TELEPHONE ENCOUNTER
MRI with sedation authorized per Dr Dixon.  Order entered.  Scheduling will call the patient.   Left patient a voice mail stating that scheduling will call within 1-2 business days and will give instructions. He will need a pre-op H&P, will be fasting, and will need a .  Left radiology scheduling phone number for him if he would like to call them.  Left RN direct phone number if patient has questions. Liz Sánchez RN

## 2023-12-20 NOTE — PROGRESS NOTES
Pt due overdue for colonoscopy. See telephone note 12/11/2023, pt wants to defer colonoscopy at this time. Will set outreach to 3 months and order at that time.     Lia Light RN, BSN  Colorectal Cancer   Division of Gastroenterology at [facility]

## 2023-12-26 ENCOUNTER — TELEPHONE (OUTPATIENT)
Dept: GASTROENTEROLOGY | Facility: CLINIC | Age: 63
End: 2023-12-26
Payer: MEDICARE

## 2023-12-26 NOTE — TELEPHONE ENCOUNTER
Caller: Erwin Aguilar    Reason for Reschedule/Cancellation (please be detailed, any staff messages or encounters to note?): Patient request to reschedule, wife is in the hospital and patient will not have anyone to accompany him      Prior to reschedule please review:  Ordering Provider: Leventhal, Thomas Michael, MD  Sedation per order: MAC  Does patient have any ASC Exclusions, please identify?: YES, CIRRHOSIS OF LIVER      Notes on Cancelled Procedure:  Procedure: Upper Endoscopy [EGD]   Date: 12/28  Location: [facility]; [address] [place] [address]  Surgeon: JUANIS      Rescheduled: Yes  Procedure: Upper Endoscopy [EGD]   Date: 5/2  Location: [facility]; [address], [address] [place] [address]  Surgeon: YUE  Sedation Level Scheduled  MAC,  Reason for Sedation Level PER ORDER  Prep/Instructions updated and sent: Synchris       Send In - basket message to Panc - Pardeep Pool if EUS  procedure is canceled or rescheduled: [ N/A, YES or NO] N/A

## 2024-01-03 ENCOUNTER — MYC MEDICAL ADVICE (OUTPATIENT)
Dept: FAMILY MEDICINE | Facility: CLINIC | Age: 64
End: 2024-01-03
Payer: MEDICARE

## 2024-01-03 DIAGNOSIS — M25.571 PAIN IN JOINT INVOLVING ANKLE AND FOOT, RIGHT: Primary | ICD-10-CM

## 2024-01-05 ENCOUNTER — LAB (OUTPATIENT)
Dept: LAB | Facility: CLINIC | Age: 64
End: 2024-01-05
Payer: MEDICARE

## 2024-01-05 ENCOUNTER — ANCILLARY PROCEDURE (OUTPATIENT)
Dept: ULTRASOUND IMAGING | Facility: CLINIC | Age: 64
End: 2024-01-05
Attending: INTERNAL MEDICINE
Payer: MEDICARE

## 2024-01-05 DIAGNOSIS — K70.30 ALCOHOLIC CIRRHOSIS OF LIVER WITHOUT ASCITES (H): ICD-10-CM

## 2024-01-05 LAB
AFP SERPL-MCNC: 1.9 NG/ML
ALBUMIN SERPL BCG-MCNC: 4.5 G/DL (ref 3.5–5.2)
ALP SERPL-CCNC: 122 U/L (ref 40–150)
ALT SERPL W P-5'-P-CCNC: 15 U/L (ref 0–70)
ANION GAP SERPL CALCULATED.3IONS-SCNC: 11 MMOL/L (ref 7–15)
AST SERPL W P-5'-P-CCNC: 19 U/L (ref 0–45)
BILIRUB DIRECT SERPL-MCNC: <0.2 MG/DL (ref 0–0.3)
BILIRUB SERPL-MCNC: 0.6 MG/DL
BUN SERPL-MCNC: 8.9 MG/DL (ref 8–23)
CALCIUM SERPL-MCNC: 9.9 MG/DL (ref 8.8–10.2)
CHLORIDE SERPL-SCNC: 101 MMOL/L (ref 98–107)
CREAT SERPL-MCNC: 0.84 MG/DL (ref 0.67–1.17)
DEPRECATED HCO3 PLAS-SCNC: 27 MMOL/L (ref 22–29)
EGFRCR SERPLBLD CKD-EPI 2021: >90 ML/MIN/1.73M2
ERYTHROCYTE [DISTWIDTH] IN BLOOD BY AUTOMATED COUNT: 12.5 % (ref 10–15)
GLUCOSE SERPL-MCNC: 119 MG/DL (ref 70–99)
HCT VFR BLD AUTO: 45.1 % (ref 40–53)
HGB BLD-MCNC: 15.7 G/DL (ref 13.3–17.7)
INR PPP: 1.23 (ref 0.85–1.15)
MCH RBC QN AUTO: 29.4 PG (ref 26.5–33)
MCHC RBC AUTO-ENTMCNC: 34.8 G/DL (ref 31.5–36.5)
MCV RBC AUTO: 85 FL (ref 78–100)
PLATELET # BLD AUTO: 203 10E3/UL (ref 150–450)
POTASSIUM SERPL-SCNC: 3.6 MMOL/L (ref 3.4–5.3)
PROT SERPL-MCNC: 8.3 G/DL (ref 6.4–8.3)
RBC # BLD AUTO: 5.34 10E6/UL (ref 4.4–5.9)
SODIUM SERPL-SCNC: 139 MMOL/L (ref 135–145)
WBC # BLD AUTO: 7.1 10E3/UL (ref 4–11)

## 2024-01-05 PROCEDURE — 82248 BILIRUBIN DIRECT: CPT | Performed by: PATHOLOGY

## 2024-01-05 PROCEDURE — 82105 ALPHA-FETOPROTEIN SERUM: CPT | Performed by: INTERNAL MEDICINE

## 2024-01-05 PROCEDURE — 85027 COMPLETE CBC AUTOMATED: CPT | Performed by: PATHOLOGY

## 2024-01-05 PROCEDURE — 85610 PROTHROMBIN TIME: CPT | Performed by: PATHOLOGY

## 2024-01-05 PROCEDURE — 76700 US EXAM ABDOM COMPLETE: CPT | Performed by: STUDENT IN AN ORGANIZED HEALTH CARE EDUCATION/TRAINING PROGRAM

## 2024-01-05 PROCEDURE — 99000 SPECIMEN HANDLING OFFICE-LAB: CPT | Performed by: PATHOLOGY

## 2024-01-05 PROCEDURE — 36415 COLL VENOUS BLD VENIPUNCTURE: CPT | Performed by: PATHOLOGY

## 2024-01-05 PROCEDURE — 80053 COMPREHEN METABOLIC PANEL: CPT | Performed by: PATHOLOGY

## 2024-01-09 ENCOUNTER — MEDICAL CORRESPONDENCE (OUTPATIENT)
Dept: HEALTH INFORMATION MANAGEMENT | Facility: CLINIC | Age: 64
End: 2024-01-09

## 2024-01-09 ENCOUNTER — OFFICE VISIT (OUTPATIENT)
Dept: FAMILY MEDICINE | Facility: CLINIC | Age: 64
End: 2024-01-09
Attending: FAMILY MEDICINE
Payer: MEDICARE

## 2024-01-09 VITALS
BODY MASS INDEX: 30.42 KG/M2 | HEIGHT: 69 IN | OXYGEN SATURATION: 97 % | HEART RATE: 54 BPM | RESPIRATION RATE: 18 BRPM | TEMPERATURE: 97.5 F | DIASTOLIC BLOOD PRESSURE: 68 MMHG | SYSTOLIC BLOOD PRESSURE: 122 MMHG | WEIGHT: 205.4 LBS

## 2024-01-09 DIAGNOSIS — K70.30 ALCOHOLIC CIRRHOSIS OF LIVER WITHOUT ASCITES (H): ICD-10-CM

## 2024-01-09 DIAGNOSIS — M21.6X1 ACQUIRED CALCANEUS DEFORMITY OF RIGHT ANKLE: ICD-10-CM

## 2024-01-09 DIAGNOSIS — Z79.4 TYPE 2 DIABETES MELLITUS WITH DIABETIC POLYNEUROPATHY, WITH LONG-TERM CURRENT USE OF INSULIN (H): ICD-10-CM

## 2024-01-09 DIAGNOSIS — S86.011A RUPTURE OF RIGHT ACHILLES TENDON, INITIAL ENCOUNTER: ICD-10-CM

## 2024-01-09 DIAGNOSIS — L97.313 SKIN ULCER OF RIGHT ANKLE WITH NECROSIS OF MUSCLE (H): ICD-10-CM

## 2024-01-09 DIAGNOSIS — E11.649 TYPE 2 DIABETES MELLITUS WITH HYPOGLYCEMIA WITHOUT COMA, WITHOUT LONG-TERM CURRENT USE OF INSULIN (H): ICD-10-CM

## 2024-01-09 DIAGNOSIS — G89.18 ACUTE POST-OPERATIVE PAIN: ICD-10-CM

## 2024-01-09 DIAGNOSIS — E11.42 DIABETIC POLYNEUROPATHY ASSOCIATED WITH TYPE 2 DIABETES MELLITUS (H): ICD-10-CM

## 2024-01-09 DIAGNOSIS — F41.1 GENERALIZED ANXIETY DISORDER: ICD-10-CM

## 2024-01-09 DIAGNOSIS — J45.40 MODERATE PERSISTENT ASTHMA WITHOUT COMPLICATION: ICD-10-CM

## 2024-01-09 DIAGNOSIS — I48.0 PAROXYSMAL ATRIAL FIBRILLATION (H): ICD-10-CM

## 2024-01-09 DIAGNOSIS — Z98.890 H/O FOOT SURGERY: ICD-10-CM

## 2024-01-09 DIAGNOSIS — G89.29 CHRONIC PAIN OF RIGHT ANKLE: ICD-10-CM

## 2024-01-09 DIAGNOSIS — M79.671 INTRACTABLE RIGHT HEEL PAIN: ICD-10-CM

## 2024-01-09 DIAGNOSIS — M62.830 BACK MUSCLE SPASM: ICD-10-CM

## 2024-01-09 DIAGNOSIS — Z79.899 MEDICATION MANAGEMENT: ICD-10-CM

## 2024-01-09 DIAGNOSIS — Z00.00 ENCOUNTER FOR ANNUAL WELLNESS EXAM IN MEDICARE PATIENT: Primary | ICD-10-CM

## 2024-01-09 DIAGNOSIS — G89.4 CHRONIC PAIN SYNDROME: ICD-10-CM

## 2024-01-09 DIAGNOSIS — E78.5 HYPERLIPIDEMIA LDL GOAL <100: ICD-10-CM

## 2024-01-09 DIAGNOSIS — M65.28 CALCIFIC ACHILLES TENDINITIS: ICD-10-CM

## 2024-01-09 DIAGNOSIS — F10.21 ALCOHOLISM IN REMISSION (H): ICD-10-CM

## 2024-01-09 DIAGNOSIS — F33.0 MAJOR DEPRESSIVE DISORDER, RECURRENT EPISODE, MILD (H): ICD-10-CM

## 2024-01-09 DIAGNOSIS — M25.571 CHRONIC PAIN OF RIGHT ANKLE: ICD-10-CM

## 2024-01-09 DIAGNOSIS — I10 HYPERTENSION GOAL BP (BLOOD PRESSURE) < 140/90: ICD-10-CM

## 2024-01-09 DIAGNOSIS — I10 HYPERTENSION GOAL BP (BLOOD PRESSURE) < 130/80: ICD-10-CM

## 2024-01-09 DIAGNOSIS — M62.838 MUSCLE SPASMS OF BOTH LOWER EXTREMITIES: ICD-10-CM

## 2024-01-09 DIAGNOSIS — E11.42 TYPE 2 DIABETES MELLITUS WITH DIABETIC POLYNEUROPATHY, WITH LONG-TERM CURRENT USE OF INSULIN (H): ICD-10-CM

## 2024-01-09 LAB
CHOLEST SERPL-MCNC: 177 MG/DL
CREAT UR-MCNC: 40.7 MG/DL
CREAT UR-MCNC: 41 MG/DL
FASTING STATUS PATIENT QL REPORTED: NO
HBA1C MFR BLD: 5.9 % (ref 0–5.6)
HDLC SERPL-MCNC: 43 MG/DL
LDLC SERPL CALC-MCNC: 113 MG/DL
MICROALBUMIN UR-MCNC: <12 MG/L
MICROALBUMIN/CREAT UR: NORMAL MG/G{CREAT}
NONHDLC SERPL-MCNC: 134 MG/DL
TRIGL SERPL-MCNC: 105 MG/DL

## 2024-01-09 PROCEDURE — 83036 HEMOGLOBIN GLYCOSYLATED A1C: CPT | Performed by: FAMILY MEDICINE

## 2024-01-09 PROCEDURE — G0481 DRUG TEST DEF 8-14 CLASSES: HCPCS | Performed by: FAMILY MEDICINE

## 2024-01-09 PROCEDURE — G0008 ADMIN INFLUENZA VIRUS VAC: HCPCS | Performed by: FAMILY MEDICINE

## 2024-01-09 PROCEDURE — 82570 ASSAY OF URINE CREATININE: CPT | Performed by: FAMILY MEDICINE

## 2024-01-09 PROCEDURE — 80061 LIPID PANEL: CPT | Performed by: FAMILY MEDICINE

## 2024-01-09 PROCEDURE — 90686 IIV4 VACC NO PRSV 0.5 ML IM: CPT | Performed by: FAMILY MEDICINE

## 2024-01-09 PROCEDURE — 99214 OFFICE O/P EST MOD 30 MIN: CPT | Mod: 25 | Performed by: FAMILY MEDICINE

## 2024-01-09 PROCEDURE — 91320 SARSCV2 VAC 30MCG TRS-SUC IM: CPT | Performed by: FAMILY MEDICINE

## 2024-01-09 PROCEDURE — G0439 PPPS, SUBSEQ VISIT: HCPCS | Performed by: FAMILY MEDICINE

## 2024-01-09 PROCEDURE — 82043 UR ALBUMIN QUANTITATIVE: CPT | Performed by: FAMILY MEDICINE

## 2024-01-09 PROCEDURE — 36415 COLL VENOUS BLD VENIPUNCTURE: CPT | Performed by: FAMILY MEDICINE

## 2024-01-09 PROCEDURE — 90480 ADMN SARSCOV2 VAC 1/ONLY CMP: CPT | Performed by: FAMILY MEDICINE

## 2024-01-09 RX ORDER — TRIAMTERENE AND HYDROCHLOROTHIAZIDE 37.5; 25 MG/1; MG/1
1 CAPSULE ORAL EVERY MORNING
Qty: 90 CAPSULE | Refills: 3 | Status: SHIPPED | OUTPATIENT
Start: 2024-01-09 | End: 2024-07-01

## 2024-01-09 RX ORDER — ROSUVASTATIN CALCIUM 20 MG/1
20 TABLET, COATED ORAL DAILY
Qty: 90 TABLET | Refills: 3 | Status: SHIPPED | OUTPATIENT
Start: 2024-01-09 | End: 2024-03-25

## 2024-01-09 RX ORDER — INSULIN GLARGINE 100 [IU]/ML
INJECTION, SOLUTION SUBCUTANEOUS
Qty: 15 ML | Refills: 3 | Status: SHIPPED | OUTPATIENT
Start: 2024-01-09 | End: 2024-05-22

## 2024-01-09 RX ORDER — PROPRANOLOL HYDROCHLORIDE 40 MG/1
80 TABLET ORAL 2 TIMES DAILY
Qty: 360 TABLET | Refills: 2 | Status: SHIPPED | OUTPATIENT
Start: 2024-01-09 | End: 2024-03-08

## 2024-01-09 RX ORDER — OXYCODONE HCL 10 MG/1
10 TABLET, FILM COATED, EXTENDED RELEASE ORAL EVERY 12 HOURS
Qty: 60 TABLET | Refills: 0 | Status: SHIPPED | OUTPATIENT
Start: 2024-01-09 | End: 2024-01-17

## 2024-01-09 RX ORDER — ALBUTEROL SULFATE 90 UG/1
AEROSOL, METERED RESPIRATORY (INHALATION)
Qty: 18 G | Refills: 1 | Status: SHIPPED | OUTPATIENT
Start: 2024-01-09

## 2024-01-09 RX ORDER — OXYCODONE HYDROCHLORIDE 5 MG/1
5 TABLET ORAL 4 TIMES DAILY PRN
Qty: 120 TABLET | Refills: 0 | Status: SHIPPED | OUTPATIENT
Start: 2024-01-09 | End: 2024-02-05

## 2024-01-09 RX ORDER — SERTRALINE HYDROCHLORIDE 100 MG/1
TABLET, FILM COATED ORAL
Qty: 180 TABLET | Refills: 3 | Status: SHIPPED | OUTPATIENT
Start: 2024-01-09 | End: 2024-07-28

## 2024-01-09 ASSESSMENT — ASTHMA QUESTIONNAIRES
ACT_TOTALSCORE: 23
QUESTION_1 LAST FOUR WEEKS HOW MUCH OF THE TIME DID YOUR ASTHMA KEEP YOU FROM GETTING AS MUCH DONE AT WORK, SCHOOL OR AT HOME: NONE OF THE TIME
QUESTION_5 LAST FOUR WEEKS HOW WOULD YOU RATE YOUR ASTHMA CONTROL: WELL CONTROLLED
ACT_TOTALSCORE: 23
QUESTION_2 LAST FOUR WEEKS HOW OFTEN HAVE YOU HAD SHORTNESS OF BREATH: NOT AT ALL
QUESTION_3 LAST FOUR WEEKS HOW OFTEN DID YOUR ASTHMA SYMPTOMS (WHEEZING, COUGHING, SHORTNESS OF BREATH, CHEST TIGHTNESS OR PAIN) WAKE YOU UP AT NIGHT OR EARLIER THAN USUAL IN THE MORNING: NOT AT ALL
QUESTION_4 LAST FOUR WEEKS HOW OFTEN HAVE YOU USED YOUR RESCUE INHALER OR NEBULIZER MEDICATION (SUCH AS ALBUTEROL): ONCE A WEEK OR LESS

## 2024-01-09 ASSESSMENT — ENCOUNTER SYMPTOMS
SHORTNESS OF BREATH: 0
CONSTIPATION: 0
PARESTHESIAS: 0
EYE PAIN: 0
CHILLS: 0
MYALGIAS: 0
HEARTBURN: 0
PALPITATIONS: 0
NERVOUS/ANXIOUS: 0
NAUSEA: 0
DIARRHEA: 0
COUGH: 0
FREQUENCY: 0
HEMATOCHEZIA: 0
ARTHRALGIAS: 1
ABDOMINAL PAIN: 0
DIZZINESS: 0
SORE THROAT: 0
WEAKNESS: 0
JOINT SWELLING: 0
HEADACHES: 0
FEVER: 0
HEMATURIA: 0
DYSURIA: 0

## 2024-01-09 ASSESSMENT — ANXIETY QUESTIONNAIRES
3. WORRYING TOO MUCH ABOUT DIFFERENT THINGS: SEVERAL DAYS
7. FEELING AFRAID AS IF SOMETHING AWFUL MIGHT HAPPEN: SEVERAL DAYS
2. NOT BEING ABLE TO STOP OR CONTROL WORRYING: SEVERAL DAYS
6. BECOMING EASILY ANNOYED OR IRRITABLE: SEVERAL DAYS
1. FEELING NERVOUS, ANXIOUS, OR ON EDGE: MORE THAN HALF THE DAYS
5. BEING SO RESTLESS THAT IT IS HARD TO SIT STILL: NOT AT ALL
8. IF YOU CHECKED OFF ANY PROBLEMS, HOW DIFFICULT HAVE THESE MADE IT FOR YOU TO DO YOUR WORK, TAKE CARE OF THINGS AT HOME, OR GET ALONG WITH OTHER PEOPLE?: NOT DIFFICULT AT ALL
GAD7 TOTAL SCORE: 7
GAD7 TOTAL SCORE: 7
4. TROUBLE RELAXING: SEVERAL DAYS
7. FEELING AFRAID AS IF SOMETHING AWFUL MIGHT HAPPEN: SEVERAL DAYS
GAD7 TOTAL SCORE: 7
IF YOU CHECKED OFF ANY PROBLEMS ON THIS QUESTIONNAIRE, HOW DIFFICULT HAVE THESE PROBLEMS MADE IT FOR YOU TO DO YOUR WORK, TAKE CARE OF THINGS AT HOME, OR GET ALONG WITH OTHER PEOPLE: NOT DIFFICULT AT ALL

## 2024-01-09 ASSESSMENT — PATIENT HEALTH QUESTIONNAIRE - PHQ9
SUM OF ALL RESPONSES TO PHQ QUESTIONS 1-9: 5
10. IF YOU CHECKED OFF ANY PROBLEMS, HOW DIFFICULT HAVE THESE PROBLEMS MADE IT FOR YOU TO DO YOUR WORK, TAKE CARE OF THINGS AT HOME, OR GET ALONG WITH OTHER PEOPLE: SOMEWHAT DIFFICULT
SUM OF ALL RESPONSES TO PHQ QUESTIONS 1-9: 5

## 2024-01-09 ASSESSMENT — ACTIVITIES OF DAILY LIVING (ADL): CURRENT_FUNCTION: NO ASSISTANCE NEEDED

## 2024-01-09 ASSESSMENT — PAIN SCALES - GENERAL: PAINLEVEL: EXTREME PAIN (9)
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
## 2024-01-09 NOTE — COMMUNITY RESOURCES LIST (ENGLISH)
01/09/2024   [facility]  N/A  For questions about this resource list or additional care needs, please contact your primary care clinic or care manager.  Phone: [phone]   Email: N/A   Address: [address] [place] [id number]   Hours: N/A        Transportation       Free or low-cost transportation  1  [facility] Distance: 3.46 miles      In-Person   [address] [place] [address]  Language: English, Azerbaijani  Hours: Mon 9:00 AM - 5:00 PM , Tue 9:30 AM - 7:00 PM , Wed 9:00 AM - 5:00 PM , Thu 9:30 AM - 7:00 PM , Fri 9:00 AM - 5:00 PM  Fees: Free   Phone: (295) 314-3476 Email: contactus@[employer] Website: http://www.[employer]     2  [employer] [facility] Circulator Bus Distance: 4.38 miles      In-Person   [address] [place] [address]  Language: English  Hours: Tue 9:00 AM - 2:00 PM  Fees: Self Pay   Phone: (732) 650-5069 Email: info@[employer] Website: http://www.[employer].org/     Transportation to medical appointments  3  [facility] - Non-Emergency Medical Transportation Distance: 1.18 miles      In-Person   [address] [place] [address]  Language: English  Hours: Mon - Fri 8:00 AM - 4:00 PM Appt. Only  Fees: Self Pay   Phone: (804) 623-1322 Website: http://www.[facility].org/Medical-Services/Emergency-medical-services/Non-emergency-transportation/     4  [facility] Transportation Programs - Non-Emergency Medical Transportation Distance: 4.54 miles      In-Person   [address] [facility] [address] [place] [address]  Language: English, Jordanian, Azerbaijani  Hours: Mon - Fri 7:00 AM - 6:00 PM  Fees: Insurance   Phone: (160) 607-5344 Ext.0442 Email: [email or web address] Website: http://www.[employer].net/[place]/          Important Numbers & Websites       Emergency Services   911  City [facility]   311  Poison Control   (153) 466-5964  Suicide Prevention Lifeline   (503) 942-3866 (TALK)  Child Abuse  Hotline   (539) 183-6576 (4-A-Child)  Sexual Assault Hotline   (224) 344-7691 (HOPE)  National Runaway Safeline   (320) 508-4435 (RUNAWAY)  All-Options Talkline   (545) 968-5937  Substance Abuse Referral   (866) 896-9732 (HELP)

## 2024-01-09 NOTE — LETTER
My Asthma Action Plan    Name: Erwin Aguilar   YOB: 1960  Date: 1/9/2024   My doctor: Joel Daniel Wegener, MD   My clinic: [facility]        My Rescue Medicine:   Albuterol inhaler (Proair/Ventolin/Proventil HFA)  2-4 puffs EVERY 4 HOURS as needed. Use a spacer if recommended by your provider.   My Asthma Severity:   Intermittent / Exercise Induced  Know your asthma triggers: smoke, upper respiratory infections, dust mites, pollens, animal dander, insects/rodents, mold, humidity, aspirin, strong odors and fumes, occupational exposure, exercise or sports, emotions, cold air, and Gastric Reflux             GREEN ZONE   Good Control  I feel good  No cough or wheeze  Can work, sleep and play without asthma symptoms       Take your asthma control medicine every day.     If exercise triggers your asthma, take your rescue medication  15 minutes before exercise or sports, and  During exercise if you have asthma symptoms  Spacer to use with inhaler: If you have a spacer, make sure to use it with your inhaler             YELLOW ZONE Getting Worse  I have ANY of these:  I do not feel good  Cough or wheeze  Chest feels tight  Wake up at night   Keep taking your Green Zone medications  Start taking your rescue medicine:  every 20 minutes for up to 1 hour. Then every 4 hours for 24-48 hours.  If you stay in the Yellow Zone for more than 12-24 hours, contact your doctor.  If you do not return to the Green Zone in 12-24 hours or you get worse, start taking your oral steroid medicine if prescribed by your provider.           RED ZONE Medical Alert - Get Help  I have ANY of these:  I feel awful  Medicine is not helping  Breathing getting harder  Trouble walking or talking  Nose opens wide to breathe       Take your rescue medicine NOW  If your provider has prescribed an oral steroid medicine, start taking it NOW  Call your doctor NOW  If you are still in the Red Zone after 20 minutes and you  have not reached your doctor:  Take your rescue medicine again and  Call 911 or go to the emergency room right away    See your regular doctor within 2 weeks of an Emergency Room or Urgent Care visit for follow-up treatment.          Annual Reminders:  Meet with Asthma Educator,  Flu Shot in the Fall, consider Pneumonia Vaccination for patients with asthma (aged 19 and older).    Pharmacy: [id number] - [place], [place] - [address]    Electronically signed by Joel Daniel Wegener, MD   Date: 01/09/24                    Asthma Triggers  How To Control Things That Make Your Asthma Worse    Triggers are things that make your asthma worse.  Look at the list below to help you find your triggers and   what you can do about them. You can help prevent asthma flare-ups by staying away from your triggers.      Trigger                                                          What you can do   Cigarette Smoke  Tobacco smoke can make asthma worse. Do not allow smoking in your home, car or around you.  Be sure no one smokes at a child s day care or school.  If you smoke, ask your health care provider for ways to help you quit.  Ask family members to quit too.  Ask your health care provider for a referral to Quit Plan to help you quit smoking, or call 5-204-657-PLAN.     Colds, Flu, Bronchitis  These are common triggers of asthma. Wash your hands often.  Don t touch your eyes, nose or mouth.  Get a flu shot every year.     Dust Mites  These are tiny bugs that live in cloth or carpet. They are too small to see. Wash sheets and blankets in hot water every week.   Encase pillows and mattress in dust mite proof covers.  Avoid having carpet if you can. If you have carpet, vacuum weekly.   Use a dust mask and HEPA vacuum.   Pollen and Outdoor Mold  Some people are allergic to trees, grass, or weed pollen, or molds. Try to keep your windows closed.  Limit time out doors when pollen count is high.   Ask you health care provider  about taking medicine during allergy season.     Animal Dander  Some people are allergic to skin flakes, urine or saliva from pets with fur or feathers. Keep pets with fur or feathers out of your home.    If you can t keep the pet outdoors, then keep the pet out of your bedroom.  Keep the bedroom door closed.  Keep pets off cloth furniture and away from stuffed toys.     Mice, Rats, and Cockroaches  Some people are allergic to the waste from these pests.   Cover food and garbage.  Clean up spills and food crumbs.  Store grease in the refrigerator.   Keep food out of the bedroom.   Indoor Mold  This can be a trigger if your home has high moisture. Fix leaking faucets, pipes, or other sources of water.   Clean moldy surfaces.  Dehumidify basement if it is damp and smelly.   Smoke, Strong Odors, and Sprays  These can reduce air quality. Stay away from strong odors and sprays, such as perfume, powder, hair spray, paints, smoke incense, paint, cleaning products, candles and new carpet.   Exercise or Sports  Some people with asthma have this trigger. Be active!  Ask your doctor about taking medicine before sports or exercise to prevent symptoms.    Warm up for 5-10 minutes before and after sports or exercise.     Other Triggers of Asthma  Cold air:  Cover your nose and mouth with a scarf.  Sometimes laughing or crying can be a trigger.  Some medicines and food can trigger asthma.

## 2024-01-09 NOTE — NURSING NOTE
Pt received COVID vaccine and flu vaccine per provider orders.  Prior to immunization administration, verified patients identity using patient s name and date of birth. Pt given VIS forms.     Patient instructed to remain in clinic for 15 minutes afterwards, and to report any adverse reactions.     Performed by Faisal Butcher RN on 1/9/2024 at 11:02 AM.

## 2024-01-09 NOTE — PROGRESS NOTES
"SUBJECTIVE:   Erwin is a 63 year old, presenting for the following:  No chief complaint on file.        1/9/2024     9:51 AM   Additional Questions   Roomed by Catrachita VAUGHN       Are you in the first 12 months of your Medicare coverage?  No    Healthy Habits:     In general, how would you rate your overall health?  Good    Frequency of exercise:  2-3 days/week    Duration of exercise:  15-30 minutes    Do you usually eat at least 4 servings of fruit and vegetables a day, include whole grains    & fiber and avoid regularly eating high fat or \"junk\" foods?  Yes    Taking medications regularly:  Yes    Medication side effects:  Lightheadedness    Ability to successfully perform activities of daily living:  No assistance needed    Home Safety:  No safety concerns identified    Hearing Impairment:  No hearing concerns    In the past 6 months, have you been bothered by leaking of urine?  No    In general, how would you rate your overall mental or emotional health?  Good    Additional concerns today:  Yes      Wellness Visit Notes:    Did have popping sound in ankle but feels nothing can be done does not want xray today     Pain medicine working well but only for about two hours so has so spread out during the day.  Wondering what options might be     Regarding diabetes has worked hard to follow healthy diet and to keep up exercising mainly with either stationary or electric bike depending on weather.     Continuing lantus daily, getting more frequent hypoglycemia down to 50s that he is managing with diet.  Desires continuous glucose monitor for easier/more thorough monitoring.     Have you ever done Advance Care Planning? (For example, a Health Directive, POLST, or a discussion with a medical provider or your loved ones about your wishes): No, advance care planning information given to patient to review.  Patient plans to discuss their wishes with loved ones or provider.         Fall risk  Fallen 2 or more times in the past " year?: Yes  Any fall with injury in the past year?: Yes      Cognitive Screening   1) Repeat 3 items (Leader, Season, Table)    2) Clock draw: ABNORMAL Clock hands at 12 and 2  3) 3 item recall: Recalls 2 objects   Results: NORMAL clock, 1-2 items recalled: COGNITIVE IMPAIRMENT LESS LIKELY    Mini-CogTM Copyright [employer]. Licensed by the author for use in [facility]; reprinted with permission (corey@[facility]). All rights reserved.      Do you have sleep apnea, excessive snoring or daytime drowsiness? : no    Reviewed and updated as needed this visit by clinical staff                  Reviewed and updated as needed this visit by Provider                 Social History     Tobacco Use    Smoking status: Former     Packs/day: 0.00     Years: 1.00     Additional pack years: 0.00     Total pack years: 0.00     Types: Cigarettes     Start date: 10/13/1983     Quit date: 1984     Years since quittin.6     Passive exposure: Past    Smokeless tobacco: Never   Substance Use Topics    Alcohol use: Not Currently     Comment: a pint of alcohol / day - last drink was 3/28/14             2023    10:29 AM   Alcohol Use   Prescreen: >3 drinks/day or >7 drinks/week? Not Applicable          No data to display              Do you have a current opioid prescription? Yes   How severe is your pain on a scale from 1-10? 9/10         Do you use any other controlled substances or medications that are not prescribed by a provider? Cannabis          PROBLEMS TO ADD ON...  -------------------------------------  Multiple stressors with spouse hospitalized 3 times over summer with sepsis once for a month.  Also significant financial troubles along with chronic dysfunction of right ankle/foot and chronic pain.     Oxycodone lasts about two hours at a time so he has a fewhours during the day where he can be active/get things done and then be able to sleep some at night.  Wondering about options.     Has worked hard on diet  and activity (electric bike) despite challenges and has lost 10 lbs!  A1c under six now he knows already and is very proud of this although reporting more frequent hypoglycemia to 50s several times a week.  He usually adjust diet to help but wonders if should slowly back off on insulin instead so he doesn't gain weight. Would like to have a cgm (freestyle kevin) to make this more possible.     Current providers sharing in care for this patient include:   Patient Care Team:  Wegener, Joel Daniel Irwin, MD as PCP - General (Family Practice)  Kathleen Sen MD as MD (Family Medicine - Sports Medicine)  Elvis Hamilton MD as MD (Family Medicine - Sports Medicine)  Kimberly Ramirez MD as MD (Gastroenterology)  Victor M Anaya DPM as MD (Podiatry)  Elvis Sauceda MD as MD (Anesthesiology)  Wegener, Joel Daniel Irwin, MD as Assigned PCP  Breanna Sahni RN as Registered Nurse (Wound Care)  Itzel Faye, PhD as Assigned Behavioral Health Provider  Roland Bass MD as Assigned Neuroscience Provider  Deny Dixon MD as Assigned Musculoskeletal Provider  Carlito Grimm MD as MD (Anesthesiology)  Wegener, Joel Daniel Irwin, MD as Assigned Pain Medication Provider  Randi Martinez as Other (see comments) (Acupuncture)  Leventhal, Thomas Michael, MD as Assigned Gastroenterology Provider  Dawit Fox MD as Assigned Heart and Vascular Provider    The following health maintenance items are reviewed in Epic and correct as of today:  Health Maintenance   Topic Date Due    PSA  Never done    HEPATITIS A IMMUNIZATION (1 of 2 - Risk 2-dose series) Never done    COLORECTAL CANCER SCREENING  05/12/2019    ASTHMA ACTION PLAN  09/17/2019    HEPATITIS B IMMUNIZATION (1 of 3 - Risk 3-dose series) Never done    RSV VACCINE (Pregnancy & 60+) (1 - 1-dose 60+ series) Never done    ZOSTER IMMUNIZATION (2 of 2) 11/16/2022    EYE EXAM  06/23/2023    INFLUENZA VACCINE (1)  "09/01/2023    COVID-19 Vaccine (7 - 2023-24 season) 09/01/2023    LIPID  10/21/2023    DIABETIC FOOT EXAM  01/02/2024    ANNUAL REVIEW OF HM ORDERS  01/02/2024    MEDICARE ANNUAL WELLNESS VISIT  01/02/2024    A1C  02/10/2024    MICROALBUMIN  05/01/2024    ASTHMA CONTROL TEST  05/07/2024    PHQ-9  05/07/2024    BMP  07/05/2024    CMP  08/10/2024    Pneumococcal Vaccine: Pediatrics (0 to 5 Years) and At-Risk Patients (6 to 64 Years) (3 of 3 - PPSV23 or PCV20) 10/13/2024    CBC  01/05/2025    ADVANCE CARE PLANNING  01/26/2028    DTAP/TDAP/TD IMMUNIZATION (3 - Td or Tdap) 10/08/2029    HEPATITIS C SCREENING  Completed    DEPRESSION ACTION PLAN  Completed    IPV IMMUNIZATION  Aged Out    HPV IMMUNIZATION  Aged Out    MENINGITIS IMMUNIZATION  Aged Out    RSV MONOCLONAL ANTIBODY  Aged Out    HIV SCREENING  Discontinued               Review of Systems   Constitutional:  Negative for chills and fever.   HENT:  Negative for congestion, ear pain, hearing loss and sore throat.    Eyes:  Negative for pain and visual disturbance.   Respiratory:  Negative for cough and shortness of breath.    Cardiovascular:  Negative for chest pain, palpitations and peripheral edema.   Gastrointestinal:  Negative for abdominal pain, constipation, diarrhea, heartburn, hematochezia and nausea.   Genitourinary:  Negative for dysuria, frequency, genital sores, hematuria, impotence, penile discharge and urgency.   Musculoskeletal:  Positive for arthralgias. Negative for joint swelling and myalgias.   Skin:  Negative for rash.   Neurological:  Negative for dizziness, weakness, headaches and paresthesias.   Psychiatric/Behavioral:  Negative for mood changes. The patient is not nervous/anxious.          OBJECTIVE:   There were no vitals taken for this visit. Estimated body mass index is 34.3 kg/m  as calculated from the following:    Height as of 8/31/23: 1.702 m (5' 7\").    Weight as of 8/31/23: 99.3 kg (219 lb).  Physical Exam  GENERAL: healthy, alert " and no distress  EYES: Eyes grossly normal to inspection, PERRL and conjunctivae and sclerae normal  HENT: ear canals and TM's normal, nose and mouth without ulcers or lesions  NECK: no adenopathy, no asymmetry, masses, or scars and thyroid normal to palpation  RESP: lungs clear to auscultation - no rales, rhonchi or wheezes  CV: regular rate and rhythm, normal S1 S2, no S3 or S4, no murmur, click or rub, no peripheral edema and peripheral pulses strong  ABDOMEN: soft, nontender, no hepatosplenomegaly, no masses and bowel sounds normal  SKIN: no suspicious lesions or rashes  NEURO: Normal strength and tone, mentation intact and speech normal  PSYCH: mentation appears normal, affect normal/bright              ASSESSMENT / PLAN:   (Z00.00) Encounter for annual wellness exam in Medicare patient  (primary encounter diagnosis)    Wellness Visit Notes:     -Last colon cancer screening done 5/2016, due 2019 (impression: One 4 mm polyp in the sigmoid colon. Resected and retrieved. Repeat 3 years d/t poor prep ) Patient plans to follow up with gastroenterologist later this year.   -PSA never done. Patient declines    Diabetes type 2 on insulin: -Eye Exam: Last done 6/2022, Patient plans to schedule/referral given.   -A1C: Last done 8/2023 (Result: 6.7) Today. 5.7! Frequent hypoglycemia.  Will order cgm.  If continues may need to slowly decrease lantus dose as activity increases/diet improves (great diet now!) and weight reducing.      -Asthma Action Plan: Sent via [employer] and printed and given to patient to review.      -Immunizations: Covid - Today, Flu - Today              Hep A, Hep B, RSV, Shingles: Patient to receive at a pharmacy         - foot pain severe and oxycodone lasts 2-3 hours only.  No problems with abuse/misuse.  Recommend adding oxycontin 10mg twice daily to current regimen. Prescriptions sent.  Follow up three months.   No suspicious activity on [place] rx monitoring database . Julito/phq9 utd.     (E11.649) Type  2 diabetes mellitus with hypoglycemia without coma, without long-term current use of insulin (H)  Comment:   Hemoglobin A1C   Date Value Ref Range Status   01/09/2024 5.9 (H) 0.0 - 5.6 % Final     Comment:     Normal <5.7%   Prediabetes 5.7-6.4%    Diabetes 6.5% or higher     Note: Adopted from ADA consensus guidelines.   08/10/2023 6.7 (H) <5.7 % Final     Comment:     Normal <5.7%   Prediabetes 5.7-6.4%    Diabetes 6.5% or higher     Note: Adopted from ADA consensus guidelines.   05/01/2023 5.8 (H) 0.0 - 5.6 % Final     Comment:     Normal <5.7%   Prediabetes 5.7-6.4%    Diabetes 6.5% or higher     Note: Adopted from ADA consensus guidelines.   04/09/2021 6.0 (H) 0 - 5.6 % Final     Comment:     Normal <5.7% Prediabetes 5.7-6.4%  Diabetes 6.5% or higher - adopted from ADA   consensus guidelines.     07/20/2020 5.8 (H) 0 - 5.6 % Final     Comment:     Normal <5.7% Prediabetes 5.7-6.4%  Diabetes 6.5% or higher - adopted from ADA   consensus guidelines.     10/08/2019 6.4 (H) 0 - 5.6 % Final     Comment:     Normal <5.7% Prediabetes 5.7-6.4%  Diabetes 6.5% or higher - adopted from ADA   consensus guidelines.       Hemoglobin A1C POCT   Date Value Ref Range Status   02/01/2018 5.6 4.3 - 6.0 % Final     Controlled.   Plan:     (E11.42,  Z79.4) Type 2 diabetes mellitus with diabetic polyneuropathy, with long-term current use of insulin (H)  Comment:   Plan: Hemoglobin A1c, Albumin Random Urine         Quantitative with Creat Ratio, insulin glargine        (LANTUS SOLOSTAR) 100 UNIT/ML pen            (E11.42) Diabetic polyneuropathy associated with type 2 diabetes mellitus (H)  Comment:   Plan: Adult Eye  Referral            (G89.4) Chronic pain syndrome  Comment: as above.   Plan: oxyCODONE (ROXICODONE) 5 MG tablet, oxyCODONE         (OXYCONTIN) 10 MG 12 hr tablet            (Z98.890) H/O foot surgery  Comment:   Plan: oxyCODONE (ROXICODONE) 5 MG tablet, oxyCODONE         (OXYCONTIN) 10 MG 12 hr tablet             (M79.671) Intractable right heel pain  Comment:   Plan: oxyCODONE (ROXICODONE) 5 MG tablet, oxyCODONE         (OXYCONTIN) 10 MG 12 hr tablet            (S86.011A) Rupture of right Achilles tendon, initial encounter  Comment: with possible re-injury.  He does not want further evaluation at this time.   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  Comment:   Plan: oxyCODONE (ROXICODONE) 5 MG tablet, oxyCODONE         (OXYCONTIN) 10 MG 12 hr tablet            (Z79.899) Medication management  Comment:   Plan: Drug Confirmation Panel Urine with Creat - lab         collect            (M25.571,  G89.29) Chronic pain of right ankle  Comment:   Plan: oxyCODONE (ROXICODONE) 5 MG tablet, oxyCODONE         (OXYCONTIN) 10 MG 12 hr tablet            (M65.28) Calcific Achilles tendinitis  Comment:   Plan: oxyCODONE (ROXICODONE) 5 MG tablet, oxyCODONE         (OXYCONTIN) 10 MG 12 hr tablet            (G89.18) Acute post-operative pain  Comment:   Plan: oxyCODONE (ROXICODONE) 5 MG tablet, oxyCODONE         (OXYCONTIN) 10 MG 12 hr tablet            (J45.40) Moderate persistent asthma without complication  Comment:       3/24/2023     1:57 PM 11/7/2023     9:44 AM 1/9/2024     9:50 AM   ACT Total Scores   ACT TOTAL SCORE (Goal Greater than or Equal to 20) 23 22 23   In the past 12 months, how many times did you visit the emergency room for your asthma without being admitted to the hospital? 0 0 0   In the past 12 months, how many times were you hospitalized overnight because of your asthma? 0 0 0     Controlled.   Plan: albuterol (PROAIR HFA/PROVENTIL HFA/VENTOLIN         HFA) 108 (90 Base) MCG/ACT inhaler            (I10) Hypertension goal BP (blood pressure) < 140/90  Comment:   Plan: propranolol (INDERAL) 40 MG tablet        At goal.     (I48.0) Paroxysmal atrial fibrillation (H)  Comment: rate  controlled/onanticoagulation.   Plan: apixaban ANTICOAGULANT (ELIQUIS ANTICOAGULANT)         5 MG tablet            (F33.0) Major depressive disorder, recurrent episode, mild (H24)  Comment:       5/1/2023     1:41 PM 11/7/2023     9:41 AM 1/9/2024     9:35 AM   PHQ   PHQ-9 Total Score 4 2 5   Q9: Thoughts of better off dead/self-harm past 2 weeks Not at all Not at all Not at all       Plan: despite intense recent stressors (spouse with sepsis 3 times this fall! ) and difficult financial situation focusing on resiliency .  Stessed but not depressed.     (M62.838) Muscle spasms of both lower extremities  Comment:   Plan:     (M62.830) Back muscle spasm  Comment:   Plan:     (F41.1) GENERALIZED ANXIETY DIS  Comment:   Plan: sertraline (ZOLOFT) 100 MG tablet            (I10) Hypertension goal BP (blood pressure) < 130/80  Comment:   Plan: triamterene-HCTZ (DYAZIDE) 37.5-25 MG capsule            (L97.313) Skin ulcer of right ankle with necrosis of muscle (H)  Comment:   Plan:     (K70.30) Alcoholic cirrhosis of liver without ascites (H)  Comment:   Plan:     (F10.21) Alcoholism in remission (H)  Comment:   Plan:     (E78.5) Hyperlipidemia LDL goal <100  Comment:   Plan: Lipid panel reflex to direct LDL Fasting,         rosuvastatin (CRESTOR) 20 MG tablet            Patient has been advised of split billing requirements and indicates understanding: Yes  In addition to preventive care exam and counseling 35 minutes spent on the date of the encounter doing chart review, history and exam, negotiating and explaining the plan with the patient, documentation and further activities as noted above.          COUNSELING:  Reviewed preventive health counseling, as reflected in patient instructions       Regular exercise       Healthy diet/nutrition       Immunizations             Colon cancer screening       Prostate cancer screening      BMI:   Estimated body mass index is 34.3 kg/m  as calculated from the following:    Height as  "of 8/31/23: 1.702 m (5' 7\").    Weight as of 8/31/23: 99.3 kg (219 lb).   Weight management plan: Discussed healthy diet and exercise guidelines      He reports that he quit smoking about 39 years ago. His smoking use included cigarettes. He started smoking about 40 years ago. He has been exposed to tobacco smoke. He has never used smokeless tobacco.      Appropriate preventive services were discussed with this patient, including applicable screening as appropriate for fall prevention, nutrition, physical activity, Tobacco-use cessation, weight loss and cognition.  Checklist reviewing preventive services available has been given to the patient.    Reviewed patients plan of care and provided an AVS. The Intermediate Care Plan ( asthma action plan, low back pain action plan, and migraine action plan) for Erwin meets the Care Plan requirement. This Care Plan has been established and reviewed with the Patient.          Joel Daniel Wegener, MD  [facility]    Identified Health Risks:  I have reviewed Opioid Use Disorder and Substance Use Disorder risk factors and made any needed referrals. The patient's PHQ-9 score is consistent with mild depression. He was provided with information regarding depression and was advised to schedule a follow up appointment in 3 months to further address this issue.  He is at risk for falling and has been provided with information to reduce the risk of falling at home.  Answers submitted by the patient for this visit:  Patient Health Questionnaire (Submitted on 1/9/2024)  If you checked off any problems, how difficult have these problems made it for you to do your work, take care of things at home, or get along with other people?: Somewhat difficult  PHQ9 TOTAL SCORE: 5  JATIN-7 (Submitted on 1/9/2024)  JATIN 7 TOTAL SCORE: 7    "

## 2024-01-10 ENCOUNTER — TELEPHONE (OUTPATIENT)
Dept: FAMILY MEDICINE | Facility: CLINIC | Age: 64
End: 2024-01-10
Payer: MEDICARE

## 2024-01-10 DIAGNOSIS — M21.6X1 ACQUIRED CALCANEUS DEFORMITY OF RIGHT ANKLE: ICD-10-CM

## 2024-01-10 DIAGNOSIS — S86.011A RUPTURE OF RIGHT ACHILLES TENDON, INITIAL ENCOUNTER: ICD-10-CM

## 2024-01-10 DIAGNOSIS — M25.571 CHRONIC PAIN OF RIGHT ANKLE: ICD-10-CM

## 2024-01-10 DIAGNOSIS — Z98.890 H/O FOOT SURGERY: ICD-10-CM

## 2024-01-10 DIAGNOSIS — G89.29 CHRONIC PAIN OF RIGHT ANKLE: ICD-10-CM

## 2024-01-10 DIAGNOSIS — M79.671 INTRACTABLE RIGHT HEEL PAIN: ICD-10-CM

## 2024-01-10 DIAGNOSIS — G89.4 CHRONIC PAIN SYNDROME: ICD-10-CM

## 2024-01-10 DIAGNOSIS — G89.18 ACUTE POST-OPERATIVE PAIN: ICD-10-CM

## 2024-01-10 DIAGNOSIS — M65.28 CALCIFIC ACHILLES TENDINITIS: ICD-10-CM

## 2024-01-10 NOTE — TELEPHONE ENCOUNTER
Cori,   Erwin called.   Saw JW yesterday for an annual wellness.   States the pharmacy was unable to fill his roxicodone 5mg.   Please advise.   Thanks!  Livia CROCKER

## 2024-01-10 NOTE — TELEPHONE ENCOUNTER
Please ask pharmacy and/or  Jeff to have prior authorization started for oxycontin as he has had reaction/allergy to morphine and we are looking for a long-acting product.     Joel Wegener,MD

## 2024-01-10 NOTE — TELEPHONE ENCOUNTER
JW,      Please see message below from TC earlier today    Called pharmacy  Roxicodone 5mg already picked up by patient.    Oxycontin 10 mg is not covered by insurance    Isela Villar RN

## 2024-01-11 LAB
GABAPENTIN UR QL CFM: PRESENT
OXYCODONE UR CFM-MCNC: 238 NG/ML
OXYCODONE/CREAT UR: 580 NG/MG {CREAT}
OXYMORPHONE UR CFM-MCNC: 123 NG/ML
OXYMORPHONE/CREAT UR: 300 NG/MG {CREAT}

## 2024-01-11 RX ORDER — OXYCODONE HYDROCHLORIDE 10 MG/1
10 TABLET ORAL 2 TIMES DAILY
Qty: 14 TABLET | Refills: 0 | Status: SHIPPED | OUTPATIENT
Start: 2024-01-11 | End: 2024-01-17

## 2024-01-11 NOTE — TELEPHONE ENCOUNTER
RENATA,  Patient calling  Voicing frustration regarding prior authorization process for new oxycontin Rx  Reports unmanaged pain with current roxicodone 5mg 4x/day  States previous CGM prior authorizations took a few months to process  Updated that the PA team received information this AM  Requested pain management plan in the mean time  Would like additional 5mg tablets if possible  Please advise  Thanks,  Romelia KLEIN RN

## 2024-01-11 NOTE — TELEPHONE ENCOUNTER
Prior Authorization Retail Medication Request    Medication/Dose: Oxycontin  Diagnosis and ICD code (if different than what is on RX):    Chronic pain syndrome [G89.4]      H/O foot surgery [Z98.890]      Intractable right heel pain [M79.671]      Rupture of right Achilles tendon, initial encounter [S86.011A]      Acquired calcaneus deformity of right ankle [M21.6X1]      Chronic pain of right ankle [M25.571, G89.29]      Calcific Achilles tendinitis [M65.28]      Acute post-operative pain [G89.18]        New/renewal/insurance change PA/secondary ins. PA:  Previously Tried and Failed:  unknown  Rationale:  unknown    Insurance   Primary: medicare  Insurance ID:  [id number]

## 2024-01-11 NOTE — TELEPHONE ENCOUNTER
I am so sorry to hear about the difficulties.  More 5 mg tablets would not be able to be filled by the pharmacy until the recent prescription expires.     So instead I sent a prescription for 10mg oxycodone short acting twice daily for a week which should give us time to get the prior auth done for the oxycontin.     Joel Wegener,MD

## 2024-01-16 NOTE — TELEPHONE ENCOUNTER
Prior authorization team.     Was prior authorization for oxycontin submitted?     See message stream.     Thank you,     Joel Wegener,MD

## 2024-01-16 NOTE — TELEPHONE ENCOUNTER
Central Prior Authorization Team - Phone: [phone]     PA Initiation    Medication: OXYCONTIN 10 MG PO T12A  Insurance Company: [employer] - Phone [phone] Fax [phone]  Pharmacy Filling the Rx: [id number] - [place], [place] - [address]  Filling Pharmacy Phone: [phone]  Filling Pharmacy Fax:    Start Date: 1/16/2024

## 2024-01-17 ENCOUNTER — MYC MEDICAL ADVICE (OUTPATIENT)
Dept: FAMILY MEDICINE | Facility: CLINIC | Age: 64
End: 2024-01-17
Payer: MEDICARE

## 2024-01-17 DIAGNOSIS — S86.011A RUPTURE OF RIGHT ACHILLES TENDON, INITIAL ENCOUNTER: ICD-10-CM

## 2024-01-17 DIAGNOSIS — G89.29 CHRONIC PAIN OF RIGHT ANKLE: ICD-10-CM

## 2024-01-17 DIAGNOSIS — G89.18 ACUTE POST-OPERATIVE PAIN: ICD-10-CM

## 2024-01-17 DIAGNOSIS — M79.671 INTRACTABLE RIGHT HEEL PAIN: ICD-10-CM

## 2024-01-17 DIAGNOSIS — M65.28 CALCIFIC ACHILLES TENDINITIS: ICD-10-CM

## 2024-01-17 DIAGNOSIS — M21.6X1 ACQUIRED CALCANEUS DEFORMITY OF RIGHT ANKLE: ICD-10-CM

## 2024-01-17 DIAGNOSIS — M25.571 CHRONIC PAIN OF RIGHT ANKLE: ICD-10-CM

## 2024-01-17 DIAGNOSIS — G89.4 CHRONIC PAIN SYNDROME: ICD-10-CM

## 2024-01-17 DIAGNOSIS — Z98.890 H/O FOOT SURGERY: ICD-10-CM

## 2024-01-17 RX ORDER — FENTANYL 12.5 UG/1
1 PATCH TRANSDERMAL
Qty: 7 PATCH | Refills: 0 | Status: SHIPPED | OUTPATIENT
Start: 2024-01-17 | End: 2024-02-08

## 2024-01-17 NOTE — TELEPHONE ENCOUNTER
JW,      Please see below medication was denied for patient    If you would like to start appeal please write Letter of Medical Necessity ID number [id number] under the Letter tab and send back to [facility] MED POOL.([phone])    Thank you,       Isela Villar RN

## 2024-01-17 NOTE — TELEPHONE ENCOUNTER
Please let Jeff know I sent him a Siriona message about the oxycontin prior auth.   Joel Wegener,MD

## 2024-01-17 NOTE — TELEPHONE ENCOUNTER
Central Prior Authorization Team - Phone: [phone]     PRIOR AUTHORIZATION DENIED    Medication: OXYCONTIN 10 MG PO T12A  Insurance Company: [employer] - Phone [phone] Fax [phone]  Denial Date: 1/16/2024    Denial Reason(s):         Appeal Information: If the provider would like to appeal, please provide a letter of medical necessity and route back to the team. Otherwise you can close the encounter. Thank you, Central PA Team    Patient Notified: NO  Unfortunately, we cannot call the patient with denials because we do not know what next steps the MD will take nor can we give medical advice, please notify the patient of what they are to expect for the continuation of their therapy from the provider.

## 2024-01-23 DIAGNOSIS — S91.309A NONHEALING WOUND OF HEEL: Primary | ICD-10-CM

## 2024-01-24 ENCOUNTER — MYC MEDICAL ADVICE (OUTPATIENT)
Dept: FAMILY MEDICINE | Facility: CLINIC | Age: 64
End: 2024-01-24
Payer: MEDICARE

## 2024-01-25 ENCOUNTER — TELEPHONE (OUTPATIENT)
Dept: FAMILY MEDICINE | Facility: CLINIC | Age: 64
End: 2024-01-25

## 2024-01-25 ENCOUNTER — VIRTUAL VISIT (OUTPATIENT)
Dept: FAMILY MEDICINE | Facility: CLINIC | Age: 64
End: 2024-01-25
Payer: MEDICARE

## 2024-01-25 ENCOUNTER — MYC MEDICAL ADVICE (OUTPATIENT)
Dept: FAMILY MEDICINE | Facility: CLINIC | Age: 64
End: 2024-01-25

## 2024-01-25 DIAGNOSIS — S86.011A RUPTURE OF RIGHT ACHILLES TENDON, INITIAL ENCOUNTER: ICD-10-CM

## 2024-01-25 DIAGNOSIS — Z98.890 H/O FOOT SURGERY: ICD-10-CM

## 2024-01-25 DIAGNOSIS — G89.18 ACUTE POST-OPERATIVE PAIN: ICD-10-CM

## 2024-01-25 DIAGNOSIS — G89.29 CHRONIC PAIN OF RIGHT ANKLE: ICD-10-CM

## 2024-01-25 DIAGNOSIS — M25.571 CHRONIC PAIN OF RIGHT ANKLE: ICD-10-CM

## 2024-01-25 DIAGNOSIS — M21.6X1 ACQUIRED CALCANEUS DEFORMITY OF RIGHT ANKLE: ICD-10-CM

## 2024-01-25 DIAGNOSIS — T14.8XXA OPEN WOUND: Primary | ICD-10-CM

## 2024-01-25 DIAGNOSIS — M65.28 CALCIFIC ACHILLES TENDINITIS: ICD-10-CM

## 2024-01-25 DIAGNOSIS — M79.671 INTRACTABLE RIGHT HEEL PAIN: ICD-10-CM

## 2024-01-25 DIAGNOSIS — G89.4 CHRONIC PAIN SYNDROME: ICD-10-CM

## 2024-01-25 PROCEDURE — 99213 OFFICE O/P EST LOW 20 MIN: CPT | Mod: 95 | Performed by: FAMILY MEDICINE

## 2024-01-25 NOTE — TELEPHONE ENCOUNTER
Patient calling regarding mychart message  Experiencing open heel wound  Denies warmth, redness, streaking to the area  Reports decreased ADLs due to increased pain from open wound  Would like antibiotics   Advised in person assessment   No available in person appointments at [facility]  Advised UC/ED for assessment of wound and pain  Declined at this time due to transportation issues  Pt would like to talk about plan of care with provider  PCP currently out of office  Scheduled VV with covering provider for further assessment and recommendations  Patient will call back if new or worsening symptoms in the mean time    Romelia KLEIN RN

## 2024-01-25 NOTE — PROGRESS NOTES
Erwin is a 63 year old who is being evaluated via a billable video visit.      How would you like to obtain your AVS? Gera-IThart  If the video visit is dropped, the invitation should be resent by: Text to cell phone: [phone]  Will anyone else be joining your video visit? No    Assessment & Plan     Open wound  Clinical examination today is not consistent with acute wound cellulitis.  Advised patient to contact us if symptoms got worse over the next 24 hours.  I compared his images from yesterday to today's images.  Moderate improvement in his wound.  Patient denies noticing any drainage.  No indication to start antibiotics today.  I advised patient to monitor his symptoms.  Marked area around the wound to monitor for any signs of redness.  Contact us if symptoms got worse and we will start him on course of antibiotics.    Referred patient for wound care    - Wound Care Referral; Future      Subjective   Erwin is a 63 year old, presenting for the following health issues:  No chief complaint on file.    History of Present Illness       Reason for visit:  Foot tesr  Symptom onset:  More than a month  Symptoms include:  Pain  Symptom intensity:  Severe  Symptom progression:  Worsening  Had these symptoms before:  Yes    He eats 4 or more servings of fruits and vegetables daily.He consumes 0 sweetened beverage(s) daily.He exercises with enough effort to increase his heart rate 60 or more minutes per day.  He exercises with enough effort to increase his heart rate 6 days per week.   He is taking medications regularly.   Erwin Aguilar is a 63 year old male who has a history significant for multiple previous right Achilles tendon repair.  Patient had recurrent wound infections and poor wound healing. Had a wound vac x 4 months.  He continues to perform wound care at home.  Over the past 2 days he is noticing small open wound.  Denies any warmth, redness or streaking in the area.  Reports an increase in his pain.  He  denies any fevers or chills.    Patient is walking with cane, riding an electric bike for mobility.       Review of Systems  Constitutional, HEENT, cardiovascular, pulmonary, gi and gu systems are negative, except as otherwise noted.      Objective       Vitals:  No vitals were obtained today due to virtual visit.    Physical Exam   GENERAL: alert and no distress  EYES: Eyes grossly normal to inspection.  No discharge or erythema, or obvious scleral/conjunctival abnormalities.  RESP: No audible wheeze, cough, or visible cyanosis.    SKIN: Visible skin clear. No significant rash, abnormal pigmentation or lesions.  NEURO: Cranial nerves grossly intact.  Mentation and speech appropriate for age.  PSYCH: Appropriate affect, tone, and pace of words.      01/24/2024 (after wound cleaning)        01/25/2024 (blue marks) - feels that something is pushing out. Soreness is the same.       Video-Visit Details    Type of service:  Video Visit   Video Start Time: 12:12 PM  Video End Time:12:36 PM    Originating Location (pt. Location): Home  Distant Location (provider location):  Off-site  Platform used for Video Visit: Kameron  Signed Electronically by: Anjali Blackburn MD

## 2024-02-02 ENCOUNTER — HOSPITAL ENCOUNTER (EMERGENCY)
Facility: CLINIC | Age: 64
Discharge: HOME | End: 2024-02-02
Attending: EMERGENCY MEDICINE | Admitting: EMERGENCY MEDICINE
Payer: MEDICARE

## 2024-02-02 VITALS
HEIGHT: 70 IN | SYSTOLIC BLOOD PRESSURE: 126 MMHG | TEMPERATURE: 98.4 F | WEIGHT: 205 LBS | OXYGEN SATURATION: 99 % | RESPIRATION RATE: 18 BRPM | HEART RATE: 61 BPM | DIASTOLIC BLOOD PRESSURE: 66 MMHG | BODY MASS INDEX: 29.35 KG/M2

## 2024-02-02 DIAGNOSIS — L08.9 SOFT TISSUE INFECTION: ICD-10-CM

## 2024-02-02 PROCEDURE — 99284 EMERGENCY DEPT VISIT MOD MDM: CPT | Performed by: EMERGENCY MEDICINE

## 2024-02-02 PROCEDURE — 250N000013 HC RX MED GY IP 250 OP 250 PS 637: Performed by: EMERGENCY MEDICINE

## 2024-02-02 PROCEDURE — 99283 EMERGENCY DEPT VISIT LOW MDM: CPT | Performed by: EMERGENCY MEDICINE

## 2024-02-02 RX ORDER — DOXYCYCLINE 100 MG/1
100 CAPSULE ORAL ONCE
Status: COMPLETED | OUTPATIENT
Start: 2024-02-02 | End: 2024-02-02

## 2024-02-02 RX ORDER — DOXYCYCLINE 100 MG/1
100 CAPSULE ORAL 2 TIMES DAILY
Qty: 14 CAPSULE | Refills: 0 | Status: SHIPPED | OUTPATIENT
Start: 2024-02-02 | End: 2024-02-09

## 2024-02-02 RX ADMIN — DOXYCYCLINE HYCLATE 100 MG: 100 CAPSULE ORAL at 23:20

## 2024-02-02 ASSESSMENT — ACTIVITIES OF DAILY LIVING (ADL): ADLS_ACUITY_SCORE: 35

## 2024-02-03 NOTE — ED NOTES
Pt ambulatory on discharge. Reviewed with patient all discharge instruction. Pt verbalized understanding. Pt advised to follow up with primary care team.

## 2024-02-03 NOTE — ED PROVIDER NOTES
ED PROVIDER NOTE  February 2, 2024  History     Chief Complaint   Patient presents with    Ankle Pain     HPI  Erwin Aguilar is a 63 year old male who has a history significant for multiple previous right Achilles tendon repair.  Patient arrives today to the emergency department via EMS due to concern of local infection.  He reports that over the past several weeks he is developed increasing swelling near the distal aspect or calcaneal insertion site of his Achilles tendons.  He has reported intermittent drainage.  He has been followed by wound care expresses concern for local infection.  Denies systemic symptoms such as fever or chills.  He does report escalation of pain and since he states he cannot bear weight on this.  Reports no new trauma.  No other contralateral involvement.  He denies ascending erythema, swelling or discomfort of the leg.      Past Medical History  Past Medical History:   Diagnosis Date    Actinic keratosis     aldara    Anxiety     Cancer (H)     squamous cell skin CA    Cauda equina spinal cord injury (H)     Chronic sinusitis 5-1-16    Depressive disorder     Diastasis recti     Esophageal reflux     Esophageal varices in cirrhosis (H) 4/1/2014    Hospitalized for UGI blee 3/28/14, endoscopy revealed bleeding varices.    Essential hypertension, benign     Intermittent asthma     Mild depression     Mixed hyperlipidemia     Nasal polyps 5-1-16    Osteoarthritis of lumbar spine, unspecified spinal osteoarthritis complication status     Other chronic pain     Paroxysmal atrial fibrillation (H) 9/22/2021    SCCA (squamous cell carcinoma) of skin     Seasonal allergic conjunctivitis     Type II or unspecified type diabetes mellitus without mention of complication, not stated as uncontrolled     Unspecified site of spinal cord injury without evidence of spinal bone injury     due to back surgery     Past Surgical History:   Procedure Laterality Date    ABDOMEN SURGERY  2014    BACK  SURGERY  August 2009    COLONOSCOPY N/A 5/12/2016    Procedure: COMBINED COLONOSCOPY, SINGLE OR MULTIPLE BIOPSY/POLYPECTOMY BY BIOPSY;  Surgeon: Ana Paula Urbina MD;  [facility]    DECOMPRESSION CUBITAL TUNNEL Left 8/31/2023    Procedure: LEFT CUBITAL TUNNEL RELEASE,;  Surgeon: Deny Dixon MD;  [place] [facility] [place]    ENDOSCOPY UPPER, COLONOSCOPY, COMBINED  10/19/2011    Procedure:COMBINED ENDOSCOPY UPPER, COLONOSCOPY; Upper Endoscopy, Colonoscopy with Polypectomy x4; Surgeon:AMBAR RODRÍGUEZIQ; Location: OR    ENT SURGERY  1-2016    Ongoing since then    ESOPHAGOSCOPY, GASTROSCOPY, DUODENOSCOPY (EGD), COMBINED  3/28/2014    Procedure: COMBINED ESOPHAGOSCOPY, GASTROSCOPY, DUODENOSCOPY (EGD);  EGD, Gastric Biopsies, Esophageal Banding;  Surgeon: Reyna Tovar MD;  Location:  OR    ESOPHAGOSCOPY, GASTROSCOPY, DUODENOSCOPY (EGD), COMBINED  6/9/2014    Procedure: COMBINED ESOPHAGOSCOPY, GASTROSCOPY, DUODENOSCOPY (EGD);  Surgeon: Curtis Mendez MD;  Location:  GI    ESOPHAGOSCOPY, GASTROSCOPY, DUODENOSCOPY (EGD), COMBINED  7/24/2014    Procedure: COMBINED ESOPHAGOSCOPY, GASTROSCOPY, DUODENOSCOPY (EGD);  Surgeon: Gerard Samaniego MD;  Location:  OR    ESOPHAGOSCOPY, GASTROSCOPY, DUODENOSCOPY (EGD), COMBINED N/A 10/31/2014    Procedure: COMBINED ESOPHAGOSCOPY, GASTROSCOPY, DUODENOSCOPY (EGD);  Surgeon: Gerard Samaniego MD;  Location:  OR    ESOPHAGOSCOPY, GASTROSCOPY, DUODENOSCOPY (EGD), COMBINED N/A 5/12/2016    Procedure: COMBINED ESOPHAGOSCOPY, GASTROSCOPY, DUODENOSCOPY (EGD);  Surgeon: Ana Paula Urbina MD;  Location:  GI    ESOPHAGOSCOPY, GASTROSCOPY, DUODENOSCOPY (EGD), COMBINED N/A 8/2/2018    Procedure: COMBINED ESOPHAGOSCOPY, GASTROSCOPY, DUODENOSCOPY (EGD);  EGD;  Surgeon: Yu Wagner MD;  [facility]    HCL SQUAMOUS CELL CARCINOMA AG  10/07    left forearm    HERNIORRHAPHY UMBILICAL  11/8/2012    Procedure: HERNIORRHAPHY UMBILICAL;  Open  Umbilical Hernia Repair With Mesh ;  Surgeon: Chase Nicholson MD;  Location: [facility]    INSERT STIMULATOR DORSAL COLUMN N/A 6/28/2018    Procedure: INSERT STIMULATOR DORSAL COLUMN;;  Surgeon: Elvis Sauceda MD;  [facility]    neuro stimulator  2010    RELEASE CARPAL TUNNEL Left 8/31/2023    Procedure: LEFT OPEN CARPAL TUNNEL RELEASE,;  Surgeon: Deny Dixon MD;  Location: [facility]    RELEASE TRIGGER FINGER Left 8/31/2023    Procedure: Release left trigger finger thumb;  Surgeon: Deny Dixon MD;  Location: [facility]    REMOVE GENERATOR STIMULATOR (LOCATION) N/A 6/28/2018    Procedure: REMOVE GENERATOR STIMULATOR (LOCATION);  Spinal Cord Stimulator and IPG Explant and Re-Implant of SCS System (Leads and IPG);  Surgeon: Elvis Sauceda MD;  [facility]    REPAIR TENDON ACHILLES Right 11/11/2020    Procedure: Right achilles debridement and partial calcaneus excision;  Surgeon: Eh Sandoval MD;  Location: [facility]    SURGICAL HISTORY OF -   1/02    abcess tooth    SURGICAL HISTORY OF -   1999    L4-5 laminectomy, cauda equina syndrome    SURGICAL HISTORY OF -   +    herniated disk repair    TONSILLECTOMY  10 1964    TRANSPOSITION ULNAR NERVE (ELBOW)      right    ZZC APPENDECTOMY  1974     doxycycline hyclate (VIBRAMYCIN) 100 MG capsule  ACE/ARB NOT PRESCRIBED, INTENTIONAL,  albuterol (PROAIR HFA/PROVENTIL HFA/VENTOLIN HFA) 108 (90 Base) MCG/ACT inhaler  apixaban ANTICOAGULANT (ELIQUIS ANTICOAGULANT) 5 MG tablet  baclofen (LIORESAL) 10 MG tablet  Continuous Blood Gluc  (FREESTYLE CACHORRO 2 READER) GASTON  Continuous Blood Gluc Sensor (FREESTYLE CACHORRO 2 SENSOR) MISC  doxazosin (CARDURA) 8 MG tablet  fentaNYL (DURAGESIC) 12 mcg/hr 72 hr patch  gabapentin (NEURONTIN) 300 MG capsule  insulin glargine (LANTUS SOLOSTAR) 100 UNIT/ML pen  insulin pen needle (GLOBAL EASE INJECT PEN NEEDLES) 32G X 4 MM miscellaneous  medical cannabis (Patient's own supply)  oxyCODONE  (ROXICODONE) 5 MG tablet  promethazine (PHENERGAN) 25 MG tablet  propranolol (INDERAL) 40 MG tablet  rosuvastatin (CRESTOR) 20 MG tablet  sertraline (ZOLOFT) 100 MG tablet  triamterene-HCTZ (DYAZIDE) 37.5-25 MG capsule      Allergies   Allergen Reactions    Levaquin Anaphylaxis and Rash     Swelling in lip and tongue felt thick    Lisinopril Anaphylaxis     Swollen tongue; inability to swallow; drooling; hives; swollen face, neck, angioedema    Acetaminophen      Hx of cirrhosis     Amlodipine      Swelling, hives, possible angioedema      Morphine      b/p dropped and arms went numb  Hypotension    Quinolones Hives    Spironolactone Unknown     Pt believes himself to be allergic to it; cannot find his old records of this.-mjc     Bactrim [Sulfamethoxazole-Trimethoprim] Rash     Family History  Family History   Problem Relation Age of Onset    Cancer Mother         lung    Breast Cancer Mother     Other Cancer Mother     Cancer Father         esophogeal, GERD    Snoring Father     Diabetes Brother         amputation, Type 1    Diabetes Brother     Diabetes Brother     Cancer - colorectal No family hx of     Glaucoma No family hx of     Macular Degeneration No family hx of     Anesthesia Reaction No family hx of     Venous thrombosis No family hx of      Social History   Social History     Tobacco Use    Smoking status: Former     Packs/day: 0.00     Years: 1.00     Additional pack years: 0.00     Total pack years: 0.00     Types: Cigarettes     Start date: 10/13/1983     Quit date: 1984     Years since quittin.6     Passive exposure: Past    Smokeless tobacco: Never   Vaping Use    Vaping Use: Every day    Substances: THC   Substance Use Topics    Alcohol use: Not Currently     Comment: a pint of alcohol / day - last drink was 3/28/14    Drug use: Yes     Frequency: 2.0 times per week     Types: Marijuana     Comment: medical marijuana - vape daily         A medically appropriate review of systems was  "performed with pertinent positives and negatives noted in the HPI, and all other systems negative.      Physical Exam   BP: 126/66  Pulse: 61  Temp: 98.4  F (36.9  C)  Resp: 18  Height: 177.8 cm (5' 10\")  Weight: 93 kg (205 lb)  SpO2: 99 %      Physical Exam  Vitals and nursing note reviewed.   Constitutional:       Appearance: He is not ill-appearing or diaphoretic.   Cardiovascular:      Rate and Rhythm: Normal rate.      Pulses: Normal pulses.   Musculoskeletal:      Left ankle: No deformity. Normal range of motion.      Left Achilles Tendon: Tenderness present.      Left foot: No swelling or deformity.        Legs:       Comments: Approximate 1 cm region of fullness, localized tenderness and warmth appreciated.  No helder fluctuance or induration.  No purulent material expressed with palpation.   Neurological:      Mental Status: He is alert.         ED Course        Procedures         No results found. However, due to the size of the patient record, not all encounters were searched. Please check Results Review for a complete set of results.  Medications   doxycycline hyclate (VIBRAMYCIN) capsule 100 mg (has no administration in time range)             Critical care was not performed.     Medical Decision Making  The patient's presentation was of moderate complexity (an acute complicated injury).    The patient's evaluation involved:  review of external note(s) from 3+ sources (see separate area of note for details)  review of 3+ test result(s) ordered prior to this encounter (see separate area of note for details)  ordering and/or review of 3+ test(s) in this encounter (see separate area of note for details)    The patient's management necessitated moderate risk (prescription drug management including medications given in the ED).    Assessments & Plan (with Medical Decision Making)     Erwin Aguilar is a 63 year old male who has a history significant for multiple previous right Achilles tendon repair.  Patient " arrives today to the emergency department via EMS due to concern of local infection.  On arrival patient to be alert.  Is presently afebrile.  He appears to be slightly uncomfortable.  External evaluation demonstrating mild swelling.  Slight palpation of fullness no helder fluctuance or induration.  Mild warmth appreciated and tenderness.  Low suspicion for septic joint.  No generalized evidence of sepsis or progressing cellulitis.  I would be concerned about local infection based on progression of swelling, discomfort, and intermittent drainage.  Difficult to appreciate depth that would plan for localized imaging and laboratory studies and I suspect patient would benefit from initiation of antibiotics and close outpatient follow-up with podiatry/orthopedic surgery.    Shortly after this patient was registered/roomed he stated he did not want to stay for further workup.  He does state he has outpatient follow-up with wound care in the next 5 days and with the surgeon the following week.  He states he would just like to be on antibiotics.  We did discuss management strategies with antibiotics but I suspect he will need local source control and continued monitoring.  He will monitor symptoms closely.  Should he develop any increase in swelling, pain, drainage, systemic symptoms such as fever chills or any other concern we are certainly happy to reevaluate him in provide specialty consultation through the emergency department as patient desires.    I have reviewed the nursing notes.    I have reviewed the findings, diagnosis, plan and need for follow up with the patient.    New Prescriptions    DOXYCYCLINE HYCLATE (VIBRAMYCIN) 100 MG CAPSULE    Take 1 capsule (100 mg) by mouth 2 times daily for 7 days       Final diagnoses:   Soft tissue infection       PHIL ASHRAF MD    2/2/2024   [facility] EMERGENCY DEPARTMENT     Phil Ashraf MD  02/02/24 0587

## 2024-02-03 NOTE — ED TRIAGE NOTES
Presents to the ED Via SPF for R achilles tendon pain. Has ruptured the tendon twice. Concerned for infection. Pain 10/10.      Triage Assessment (Adult)       Row Name 02/02/24 1234          Triage Assessment    Airway WDL WDL        Respiratory WDL    Respiratory WDL WDL        Skin Circulation/Temperature WDL    Skin Circulation/Temperature WDL WDL        Cardiac WDL    Cardiac WDL WDL        Peripheral/Neurovascular WDL    Peripheral Neurovascular WDL WDL        Cognitive/Neuro/Behavioral WDL    Cognitive/Neuro/Behavioral WDL WDL

## 2024-02-05 ENCOUNTER — MYC REFILL (OUTPATIENT)
Dept: ANESTHESIOLOGY | Facility: CLINIC | Age: 64
End: 2024-02-05
Payer: MEDICARE

## 2024-02-05 DIAGNOSIS — M54.17 LUMBOSACRAL RADICULOPATHY AT S1: ICD-10-CM

## 2024-02-05 RX ORDER — GABAPENTIN 300 MG/1
CAPSULE ORAL
Qty: 90 CAPSULE | Refills: 1 | Status: SHIPPED | OUTPATIENT
Start: 2024-02-05 | End: 2024-03-29

## 2024-02-05 NOTE — TELEPHONE ENCOUNTER
(DOD),  Please see below Tripeese message and advise.  Pended order to start 2/8/24  Thanks,  Roslyn MORFIN RN

## 2024-02-05 NOTE — TELEPHONE ENCOUNTER
Refill request    Medication: gabapentin (NEURONTIN) 300 MG capsule     Sig: TAKE 1 CAPSULE BY MOUTH IN THE AFTERNOON AND 2 CAPSULES AT BEDTIME     Dispensed: 90  Refills: 1  SOLD to the pt on: 10/30/23     Last clinic appointment: 5/4/23  Next clinic appointment: 2/15/24    Last Drug Screen Collected: not on file  Controlled Substance Agreement signed: not on file      Preferred pharmacy:    [id number] - [place], [place] - [facility]       Refill request routed to the provider to review.

## 2024-02-06 RX ORDER — OXYCODONE HYDROCHLORIDE 5 MG/1
5 TABLET ORAL 4 TIMES DAILY PRN
Qty: 120 TABLET | Refills: 0 | Status: SHIPPED | OUTPATIENT
Start: 2024-02-08 | End: 2024-03-08

## 2024-02-08 ENCOUNTER — OFFICE VISIT (OUTPATIENT)
Dept: WOUND CARE | Facility: CLINIC | Age: 64
End: 2024-02-08
Payer: MEDICARE

## 2024-02-08 DIAGNOSIS — S91.301D OPEN WOUND OF RIGHT HEEL, SUBSEQUENT ENCOUNTER: ICD-10-CM

## 2024-02-08 DIAGNOSIS — T14.8XXA OPEN WOUND: ICD-10-CM

## 2024-02-08 DIAGNOSIS — G89.4 CHRONIC PAIN SYNDROME: Primary | ICD-10-CM

## 2024-02-08 PROCEDURE — 99211 OFF/OP EST MAY X REQ PHY/QHP: CPT

## 2024-02-08 NOTE — PROGRESS NOTES
Patient comes to wound clinic for dressing change  per request of Dr Blackburn  He has history of a Open surgical wound PROCEDURES:  1.  Right Achilles tendon debridement. , 2.  Right calcaneus partial excision. , 3.  Right Achilles tendon reattachmen. On 11/11/2020 with dr Sandoval    Subjective: Pt assessed for discomfort which is severe.  He sates that he cannot walk on his right leg, that his quality of life is significantly affected by the pain in the heal. He has experienced several pain control modalities like pain pump and gabapentin. He might now be started on a fentanyl patch per pt.    Objective:     Open wound  Chronic pain syndrome  Open wound of right heel, subsequent encounter  Wound evaluation  Number of of wounds: 1  Wound type: Surgical wound on the Achilles repair incision from 2020.  Currently wound is closed.  There is no drainage but there appears to be some swelling underneath the skin.    Assessment: There is a lot of scarring around the old incision however the skin is dry and there is no open granulation tissue..  There currently is no open wound.  There does appear to be some fluid collection underneath the skin.  This is very painful to touch.  Patient is very frustrated by his current cares for the pain management.  He is considering an amputation since his quality of life so low.  He has to use a wheelchair in the house because he cannot walk.  He uses an electric bike to get around.  His ankle and heel are extremely sensitive to touch.  Patient is walking with a cane.    Treatment: Advised that he covers in the wound with sterile bandage when it started using and to keep track of the redness around the wound.  He will follow-up with the surgeon.  Since there is no wound right now there is not much I can offer  PLAN: Follow-up with surgery for advice regarding the pain in his right Achilles status post several Achilles tears    Pt has our number should other issues arise. All questions answered  for now.   Patient needs to be seen prn.   Dr Anaya  was available for supervision of care if needed or if questions should arise and regarding plan of care. Breanna Sahni RN

## 2024-02-09 ASSESSMENT — ANXIETY QUESTIONNAIRES
3. WORRYING TOO MUCH ABOUT DIFFERENT THINGS: NOT AT ALL
5. BEING SO RESTLESS THAT IT IS HARD TO SIT STILL: NOT AT ALL
7. FEELING AFRAID AS IF SOMETHING AWFUL MIGHT HAPPEN: NOT AT ALL
2. NOT BEING ABLE TO STOP OR CONTROL WORRYING: NOT AT ALL
GAD7 TOTAL SCORE: 0
6. BECOMING EASILY ANNOYED OR IRRITABLE: NOT AT ALL
GAD7 TOTAL SCORE: 0
1. FEELING NERVOUS, ANXIOUS, OR ON EDGE: NOT AT ALL
7. FEELING AFRAID AS IF SOMETHING AWFUL MIGHT HAPPEN: NOT AT ALL
GAD7 TOTAL SCORE: 0
4. TROUBLE RELAXING: NOT AT ALL

## 2024-02-09 ASSESSMENT — PAIN SCALES - PAIN ENJOYMENT GENERAL ACTIVITY SCALE (PEG)
INTERFERED_GENERAL_ACTIVITY: 10
INTERFERED_ENJOYMENT_LIFE: 10 - COMPLETELY INTERFERES
PEG_TOTALSCORE: 10
AVG_PAIN_PASTWEEK: 10 - PAIN AS BAD AS YOU CAN IMAGINE
PEG_TOTALSCORE: 10
AVG_PAIN_PASTWEEK: 10
INTERFERED_GENERAL_ACTIVITY: 10 - COMPLETELY INTERFERES
INTERFERED_ENJOYMENT_LIFE: 10

## 2024-02-12 DIAGNOSIS — I10 HYPERTENSION GOAL BP (BLOOD PRESSURE) < 140/90: ICD-10-CM

## 2024-02-12 RX ORDER — DOXAZOSIN 8 MG/1
8 TABLET ORAL AT BEDTIME
Qty: 90 TABLET | Refills: 1 | Status: SHIPPED | OUTPATIENT
Start: 2024-02-12 | End: 2024-05-16

## 2024-02-14 DIAGNOSIS — M79.671 RIGHT FOOT PAIN: Primary | ICD-10-CM

## 2024-02-14 NOTE — TELEPHONE ENCOUNTER
DIAGNOSIS: Nonhealing wound of heel needs surgical debridement/Dr. Carlito Grimm/Medicare/Xrays done 02/2023/DS 01/24/24 Patient does not want to see Dr. Sandoval    APPOINTMENT DATE: 2/16/24    NOTES STATUS DETAILS   OFFICE NOTE from referring provider Internal 5/4/23 - Carlito Grimm MD - [facility]   OFFICE NOTE from other specialist Internal 2/8/24 - Breanna Sahni RN - [facility]   MEDICATION LIST Internal

## 2024-02-15 ENCOUNTER — VIRTUAL VISIT (OUTPATIENT)
Dept: ANESTHESIOLOGY | Facility: CLINIC | Age: 64
End: 2024-02-15
Payer: MEDICARE

## 2024-02-15 DIAGNOSIS — F41.1 GENERALIZED ANXIETY DISORDER: ICD-10-CM

## 2024-02-15 DIAGNOSIS — T14.8XXA CHRONIC WOUND: Primary | ICD-10-CM

## 2024-02-15 DIAGNOSIS — F11.90 CHRONIC, CONTINUOUS USE OF OPIOIDS: ICD-10-CM

## 2024-02-15 DIAGNOSIS — Z96.89 STATUS POST INSERTION OF SPINAL CORD STIMULATOR: ICD-10-CM

## 2024-02-15 DIAGNOSIS — F33.0 MAJOR DEPRESSIVE DISORDER, RECURRENT EPISODE, MILD (H): ICD-10-CM

## 2024-02-15 PROCEDURE — 99213 OFFICE O/P EST LOW 20 MIN: CPT | Mod: 95 | Performed by: ANESTHESIOLOGY

## 2024-02-15 ASSESSMENT — PAIN SCALES - GENERAL: PAINLEVEL: SEVERE PAIN (6)

## 2024-02-15 NOTE — LETTER
2/15/2024       RE: Erwin Aguilar  [address]  [place] [address]       Dear Colleague,    Thank you for referring your patient, Erwin Aguilar, to the [facility] [place] at [facility]. Please see a copy of my visit note below.    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   He has a long history of chronic low back pain.  In early 1990s, he had lower back surgery subsequently in 2009 he had another surgery by Dr. Stein due to cauda equina syndrome.  In 2010, he underwent SCS by Dr. Garcia at [facility] (currently [facility]), which afforded significant pain relief.  He had revision of SCS  by Dr. Sauceda in 2018.  Unfortunately he has not been getting good coverage of the SCS now.  He has seen Dr. Garcia in May 2021 and had SCS reprogrammed.  The top of the left SCS lead is at the T11 and top of the right SCS lead a at T12.  He also established with a pain psychologist for his underlying anxiety and depression.  He has been certified for medical cannabis and has been using consistently with some benefit.      As a sequela of the previous spine surgeries, he developed right foot drop.  He also had Achilles tendon repair in December 2020 and had a complicated recovery and needed wound VAC for 3 months postop. At some point, he was diagnosed with CRPS.  Then he had bony spur on the right ankle for which he had surgery.  Since then he developed right ankle/foot pain.  Currently this  pain is most  bothersome.  However, he noted new onset of radicular pain starting in the right hamstring, right side of the leg down to the dorsum of the foot.         Since the last visit, Erwin Aguilar reports:  Her lower leg pain got significantly worse.  he is going to see Dr. Kurtis Nye.  he wants amputation of his leg since pain is so severe.  I told him that it is up to Dr. Nye to decide whether amputation is appropriate.  He is worried about the postoperative pain and recovery from the surgery.  I told him that I will contact with Dr. Nye and make perioperative recommendation.  All of his questions were answered.      Recommendations/plan at the last visit included:    - Medications.      Continue medical cannabis  Baclofen 20 mg 3 times daily  Start gabapentin 300 mg 3 times daily     - Interventional procedures:     Ordered right L5-S1 epidural steroid injection.  Risk of the procedure including bleeding, infection, failed procedure, nerve injury and paralysis discussed with the patient.     - Labs and imaging: Ordered x-ray of the thoracolumbar spine standing to evaluate lead migration.  Ordered  MRI of the lumbosacral spine for his new onset of right S1 radiculopathy pain..      - Rehab: Discussed about physical therapy but he is not interested.     - Psychology: No current needs.  He has been taking Zoloft      - Integrated medicine: We will refer him to acupuncture therapy     - Disposition: We will see him for the above-mentioned procedure.  Clinic follow-up as needed.              [place] Prescription Monitoring Program:   Total: 28  Private Pay: 4  Showing 1-15 of 28 Items View 15 Items   of 2   Filled  Written  ID  Drug  QTY  Days  Prescriber  RX #  Dispenser  Refill  Daily Dose*  Pymt Type      02/08/2024 02/06/2024 2 Oxycodone Hcl (Ir) 5 Mg Tablet 120.00 30 Sa Ndy [id number] Gra (5495) 0/0 30.00 [place]   02/05/2024 02/05/2024 2 Gabapentin 300 Mg Capsule 90.00 30 Ra Ban [id number] Gra (1967) 0/1   [place]   01/11/2024 01/11/2024 2 Oxycodone Hcl (Ir) 10 Mg Tab 14.00 7 Deja Weg [id number] Gra (1721) 0/0 30.00 [place]   01/09/2024 01/09/2024 2 Oxycodone Hcl (Ir) 5 Mg Tablet 120.00 30 Deja Weg [id number] Gra (1721) 0/0 30.00 [place]   12/10/2023 12/07/2023 2 Oxycodone Hcl (Ir) 5 Mg Tablet 120.00 30 Deja Weg [id number] Gra (1721) 0/0 30.00 [place]   11/30/2023 10/30/2023 2 Gabapentin 300 Mg Capsule 90.00 30 Ra Ban [id number] Gra (1721) 1/1  [place]   11/10/2023 11/10/2023 2 Oxycodone Hcl (Ir) 5 Mg Tablet 120.00 30 Deja Weg [id number] Gra (1721) 0/0 30.00 [place]   10/30/2023 10/30/2023 2 Gabapentin 300 Mg Capsule 90.00 30 Ra Ban [id number] Gra (1721) 0/1  [place]       Review of Systems:  The 14 system ROS was reviewed and was negative except what is documented above and as follows.  Any bowel or bladder problems: none  Mood: okay    Physical Exam:  There were no vitals filed for this visit.  Since this is a video visit  General: Awake in no apparent distress.  Pleasant in conversation    Lungs: unlabored.     Extremities: Is able to move all extremities  Neurologic exam: Alert awake and oriented   psychiatric; mentation intact      Medications:  Current Outpatient Medications   Medication Sig Dispense Refill    ACE/ARB NOT PRESCRIBED, INTENTIONAL, ACE & ARB not prescribed due to Allergy      albuterol (PROAIR HFA/PROVENTIL HFA/VENTOLIN HFA) 108 (90 Base) MCG/ACT inhaler INHALE 2 PUFFS BY MOUTH EVERY 6 HOURS AS NEEDED FOR SHORTNESS OF BREATH OR WHEEZING 18 g 1    apixaban ANTICOAGULANT (ELIQUIS ANTICOAGULANT) 5 MG tablet Take 1 tablet (5 mg) by mouth 2 times daily 180 tablet 3    baclofen (LIORESAL) 10 MG tablet TAKE 2 TABLETS BY MOUTH 3 TIMES A  tablet 3    Continuous Blood Gluc  (FREESTYLE CACHORRO 2 READER) GASTON USE TO READ BLOOD SUGARS AS PER 'S INSTRUCTIONS 1 each 0    doxazosin (CARDURA) 8 MG tablet Take 1 tablet (8 mg) by mouth at bedtime 90 tablet 1     gabapentin (NEURONTIN) 300 MG capsule TAKE 1 CAPSULE BY MOUTH IN THE AFTERNOON AND 2 CAPSULES AT BEDTIME 90 capsule 1    insulin glargine (LANTUS SOLOSTAR) 100 UNIT/ML pen INJECT 26 UNITS SUBCUTANEOUSLY AT BEDTIME 15 mL 3    insulin pen needle (GLOBAL EASE INJECT PEN NEEDLES) 32G X 4 MM miscellaneous USE AS DIRECTED EVERY  each 3    medical cannabis (Patient's own supply) See Admin Instructions (The purpose of this order is to document that the patient reports taking medical cannabis.  This is not a prescription, and is not used to certify that the patient has a qualifying medical condition.)   Flower - PRN      oxyCODONE (ROXICODONE) 5 MG tablet Take 1 tablet (5 mg) by mouth 4 times daily as needed for severe pain #120/month, visit with Dr. Wegener every 3 months. 120 tablet 0    promethazine (PHENERGAN) 25 MG tablet TAKE 1 TABLET BY MOUTH 3 TIMES A DAY AS NEEDED FOR NAUSEA 270 tablet 1    propranolol (INDERAL) 40 MG tablet Take 2 tablets (80 mg) by mouth 2 times daily 360 tablet 2    rosuvastatin (CRESTOR) 20 MG tablet Take 1 tablet (20 mg) by mouth daily 90 tablet 3    sertraline (ZOLOFT) 100 MG tablet TAKE 2 TABLETS BY MOUTH DAILY 180 tablet 3    triamterene-HCTZ (DYAZIDE) 37.5-25 MG capsule Take 1 capsule by mouth every morning 90 capsule 3    Continuous Blood Gluc Sensor (FREESTYLE CACHORRO 2 SENSOR) MISC CHANGE EVERY 14 DAYS 1 each 5       Analgesic Medications:   Medications related to Pain Management (From now, onward)      None               LABORATORY VALUES:   Recent Labs   Lab Test 01/05/24  1208 08/31/23  1453 08/10/23  0710     --  141   POTASSIUM 3.6  --  3.8   CHLORIDE 101  --  103   CO2 27  --  27   ANIONGAP 11  --  11   * 116* 167*   BUN 8.9  --  9.6   CR 0.84  --  0.79   AFRICA 9.9  --  9.6       CBC RESULTS:   Recent Labs   Lab Test 01/05/24  1208   WBC 7.1   RBC 5.34   HGB 15.7   HCT 45.1   MCV 85   MCH 29.4   MCHC 34.8   RDW 12.5          Most Recent 3 INR's:  Recent Labs    Lab Test 01/05/24  1208 08/10/23  0710 07/16/22  1342   INR 1.23* 1.11 1.08           ASSESSMENT:  Diagnoses         Codes Comments    Chronic wound    -  Primary T14.8XXA     Major depressive disorder, recurrent episode, mild (H24)     F33.0     Generalized anxiety disorder     F41.1     Chronic, continuous use of opioids     F11.90     Status post insertion of spinal cord stimulator     Z96.89             PLAN:    - Medications.      Continue medical cannabis  Baclofen 20 mg 3 times daily  Start gabapentin 300 mg 3 times daily      Recommendation for his upcoming surgery was relayed to Dr. Kurtis Nye.  In brief, recommend to use a continuous infusion of local anesthetic to the epidural catheter preoperatively.  He has been on chronic opioid therapy, which should be continued in the preoperative period.        - Interventional procedures:     None at this time    - Labs and imaging: Ordered x-ray of the thoracolumbar spine standing to evaluate lead migration.  Ordered  MRI of the lumbosacral spine for his new onset of right S1 radiculopathy pain..      - Rehab: None     - Psychology: No current needs.  He has been taking Zoloft      - Integrated medicine: None     - Disposition:  Clinic follow-up as needed.       Assessment will be ongoing with changes in treatment as indicated.  Benefits/risks/alternatives to treatment have been reviewed and the patient has been instructed to contact this office if they have any questions or concerns.  This plan of care has been discussed with the patient and the patient is in agreement.     Answers submitted by the patient for this visit:  JATIN-7 (Submitted on 2/9/2024)  JATIN 7 TOTAL SCORE: 0      Again, thank you for allowing me to participate in the care of your patient.      Sincerely,    Carlito Grimm MD

## 2024-02-15 NOTE — PROGRESS NOTES
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   He has a long history of chronic low back pain.  In early 1990s, he had lower back surgery subsequently in 2009 he had another surgery by Dr. Stein due to cauda equina syndrome.  In 2010, he underwent SCS by Dr. Garcia at [facility] (currently [facility] [place]), which afforded significant pain relief.  He had revision of SCS  by Dr. Sauceda in 2018.  Unfortunately he has not been getting good coverage of the SCS now.  He has seen Dr. Garcia in May 2021 and had SCS reprogrammed.  The top of the left SCS lead is at the T11 and top of the right SCS lead a at T12.  He also established with a pain psychologist for his underlying anxiety and depression.  He has been certified for medical cannabis and has been using consistently with some benefit.      As a sequela of the previous spine surgeries, he developed right foot drop.  He also had Achilles tendon repair in December 2020 and had a complicated recovery and needed wound VAC for 3 months postop. At some point, he was diagnosed with CRPS.  Then he had bony spur on the right ankle for which he had surgery.  Since then he developed right ankle/foot pain.  Currently this  pain is most bothersome.  However, he noted new onset of radicular pain starting in the right hamstring, right side of the leg down to the dorsum of the foot.         Since the last visit, Erwin Aguilar reports:  Her lower leg pain got significantly worse.  he is going to see Dr. Kurtsi Nye.  he wants amputation of his leg since pain is so severe.  I told  him that it is up to Dr. Nye to decide whether amputation is appropriate.  He is worried about the postoperative pain and recovery from the surgery.  I told him that I will contact with Dr. Nye and make perioperative recommendation.  All of his questions were answered.      Recommendations/plan at the last visit included:    - Medications.      Continue medical cannabis  Baclofen 20 mg 3 times daily  Start gabapentin 300 mg 3 times daily     - Interventional procedures:     Ordered right L5-S1 epidural steroid injection.  Risk of the procedure including bleeding, infection, failed procedure, nerve injury and paralysis discussed with the patient.     - Labs and imaging: Ordered x-ray of the thoracolumbar spine standing to evaluate lead migration.  Ordered  MRI of the lumbosacral spine for his new onset of right S1 radiculopathy pain..      - Rehab: Discussed about physical therapy but he is not interested.     - Psychology: No current needs.  He has been taking Zoloft      - Integrated medicine: We will refer him to acupuncture therapy     - Disposition: We will see him for the above-mentioned procedure.  Clinic follow-up as needed.              [place] Prescription Monitoring Program:   Total: 28  Private Pay: 4  Showing 1-15 of 28 Items View 15 Items   of 2   Filled  Written  ID  Drug  QTY  Days  Prescriber  RX #  Dispenser  Refill  Daily Dose*  Pymt Type      02/08/2024 02/06/2024 2 Oxycodone Hcl (Ir) 5 Mg Tablet 120.00 30 Sa Ndy [id number] Gra (7570) 0/0 30.00 [place]   02/05/2024 02/05/2024 2 Gabapentin 300 Mg Capsule 90.00 30 Ra Ban [id number] Gra (7570) 0/1  [place]   01/11/2024 01/11/2024 2 Oxycodone Hcl (Ir) 10 Mg Tab 14.00 7 Deja Weg [id number] Gra (1721) 0/0 30.00 [place]   01/09/2024 01/09/2024 2 Oxycodone Hcl (Ir) 5 Mg Tablet 120.00 30 Deja Weg [id number] Gra (1721) 0/0 30.00 [place]   12/10/2023 12/07/2023 2 Oxycodone Hcl (Ir) 5 Mg Tablet 120.00 30 Deja Weg [id number] Gra (1721)  0/0 30.00 [place]   11/30/2023 10/30/2023 2 Gabapentin 300 Mg Capsule 90.00 30 Ra Ban [id number] Gra (1721) 1/1  [place]   11/10/2023 11/10/2023 2 Oxycodone Hcl (Ir) 5 Mg Tablet 120.00 30 Deja Kohli [id number] Gra (1721) 0/0 30.00 [place]   10/30/2023 10/30/2023 2 Gabapentin 300 Mg Capsule 90.00 30 Ra Ban [id number] Gra (1721) 0/1  [place]       Review of Systems:  The 14 system ROS was reviewed and was negative except what is documented above and as follows.  Any bowel or bladder problems: none  Mood: okay    Physical Exam:  There were no vitals filed for this visit.  Since this is a video visit  General: Awake in no apparent distress.  Pleasant in conversation    Lungs: unlabored.     Extremities: Is able to move all extremities  Neurologic exam: Alert awake and oriented   psychiatric; mentation intact      Medications:  Current Outpatient Medications   Medication Sig Dispense Refill    ACE/ARB NOT PRESCRIBED, INTENTIONAL, ACE & ARB not prescribed due to Allergy      albuterol (PROAIR HFA/PROVENTIL HFA/VENTOLIN HFA) 108 (90 Base) MCG/ACT inhaler INHALE 2 PUFFS BY MOUTH EVERY 6 HOURS AS NEEDED FOR SHORTNESS OF BREATH OR WHEEZING 18 g 1    apixaban ANTICOAGULANT (ELIQUIS ANTICOAGULANT) 5 MG tablet Take 1 tablet (5 mg) by mouth 2 times daily 180 tablet 3    baclofen (LIORESAL) 10 MG tablet TAKE 2 TABLETS BY MOUTH 3 TIMES A  tablet 3    Continuous Blood Gluc  (FREESTYLE CACHORRO 2 READER) GASTON USE TO READ BLOOD SUGARS AS PER 'S INSTRUCTIONS 1 each 0    doxazosin (CARDURA) 8 MG tablet Take 1 tablet (8 mg) by mouth at bedtime 90 tablet 1    gabapentin (NEURONTIN) 300 MG capsule TAKE 1 CAPSULE BY MOUTH IN THE AFTERNOON AND 2 CAPSULES AT BEDTIME 90 capsule 1    insulin glargine (LANTUS SOLOSTAR) 100 UNIT/ML pen INJECT 26 UNITS SUBCUTANEOUSLY AT BEDTIME 15 mL 3    insulin pen needle (GLOBAL EASE INJECT PEN NEEDLES) 32G X 4 MM miscellaneous USE AS DIRECTED EVERY  each 3    medical  cannabis (Patient's own supply) See Admin Instructions (The purpose of this order is to document that the patient reports taking medical cannabis.  This is not a prescription, and is not used to certify that the patient has a qualifying medical condition.)   Flower - PRN      oxyCODONE (ROXICODONE) 5 MG tablet Take 1 tablet (5 mg) by mouth 4 times daily as needed for severe pain #120/month, visit with Dr. Wegener every 3 months. 120 tablet 0    promethazine (PHENERGAN) 25 MG tablet TAKE 1 TABLET BY MOUTH 3 TIMES A DAY AS NEEDED FOR NAUSEA 270 tablet 1    propranolol (INDERAL) 40 MG tablet Take 2 tablets (80 mg) by mouth 2 times daily 360 tablet 2    rosuvastatin (CRESTOR) 20 MG tablet Take 1 tablet (20 mg) by mouth daily 90 tablet 3    sertraline (ZOLOFT) 100 MG tablet TAKE 2 TABLETS BY MOUTH DAILY 180 tablet 3    triamterene-HCTZ (DYAZIDE) 37.5-25 MG capsule Take 1 capsule by mouth every morning 90 capsule 3    Continuous Blood Gluc Sensor (FREESTYLE CACHORRO 2 SENSOR) MISC CHANGE EVERY 14 DAYS 1 each 5       Analgesic Medications:   Medications related to Pain Management (From now, onward)      None               LABORATORY VALUES:   Recent Labs   Lab Test 01/05/24  1208 08/31/23  1453 08/10/23  0710     --  141   POTASSIUM 3.6  --  3.8   CHLORIDE 101  --  103   CO2 27  --  27   ANIONGAP 11  --  11   * 116* 167*   BUN 8.9  --  9.6   CR 0.84  --  0.79   AFRICA 9.9  --  9.6       CBC RESULTS:   Recent Labs   Lab Test 01/05/24  1208   WBC 7.1   RBC 5.34   HGB 15.7   HCT 45.1   MCV 85   MCH 29.4   MCHC 34.8   RDW 12.5          Most Recent 3 INR's:  Recent Labs   Lab Test 01/05/24  1208 08/10/23  0710 07/16/22  1342   INR 1.23* 1.11 1.08           ASSESSMENT:  Diagnoses         Codes Comments    Chronic wound    -  Primary T14.8XXA     Major depressive disorder, recurrent episode, mild (H24)     F33.0     Generalized anxiety disorder     F41.1     Chronic, continuous use of opioids     F11.90     Status  post insertion of spinal cord stimulator     Z96.89                PLAN:    - Medications.      Continue medical cannabis  Baclofen 20 mg 3 times daily  Start gabapentin 300 mg 3 times daily    .    Recommendation for his upcoming surgery was relayed to Dr. Kurtis Nye.  In brief, recommend to use a continuous infusion of local anesthetic to the epidural catheter preoperatively.  He has been on chronic opioid therapy, which should be continued in the preoperative period.        - Interventional procedures:     None at this time    - Labs and imaging: Ordered x-ray of the thoracolumbar spine standing to evaluate lead migration.  Ordered  MRI of the lumbosacral spine for his new onset of right S1 radiculopathy pain..      - Rehab: None     - Psychology: No current needs.  He has been taking Zoloft      - Integrated medicine: None     - Disposition:  Clinic follow-up as needed.          Assessment will be ongoing with changes in treatment as indicated.  Benefits/risks/alternatives to treatment have been reviewed and the patient has been instructed to contact this office if they have any questions or concerns.  This plan of care has been discussed with the patient and the patient is in agreement.     Carlito Grimm MD, PHD  Answers submitted by the patient for this visit:  JATIN-7 (Submitted on 2/9/2024)  JATIN 7 TOTAL SCORE: 0

## 2024-02-15 NOTE — PATIENT INSTRUCTIONS
Treatment planning:    Recommended post operative pain plan will be in the office note.      Recommended Follow up:      Follow up as needed.      To speak with a nurse, schedule/reschedule/cancel a clinic appointment, or request a medication refill call: [phone]    You can also reach us by [employer]: [email or web address]

## 2024-02-15 NOTE — NURSING NOTE
Patient presents with:  Follow Up: Follow-up [facility] Back Pain- Hip       Severe Pain (6)     Pain Medications       Opioid Agonists Refills Start End     oxyCODONE (ROXICODONE) 5 MG tablet 0 2/8/2024 --    Sig - Route: Take 1 tablet (5 mg) by mouth 4 times daily as needed for severe pain #120/month, visit with Dr. Wegener every 3 months. - Oral    Class: E-Prescribe    Earliest Fill Date: 2/8/2024            What medications are you using for pain? Oxycodone    (New patients only) Have you been seen by another pain clinic/ provider? no    (Return Patients only) What refills are you needing today? no

## 2024-02-16 ENCOUNTER — OFFICE VISIT (OUTPATIENT)
Dept: ORTHOPEDICS | Facility: CLINIC | Age: 64
End: 2024-02-16
Attending: ANESTHESIOLOGY
Payer: MEDICARE

## 2024-02-16 ENCOUNTER — PRE VISIT (OUTPATIENT)
Dept: ORTHOPEDICS | Facility: CLINIC | Age: 64
End: 2024-02-16

## 2024-02-16 ENCOUNTER — ANCILLARY PROCEDURE (OUTPATIENT)
Dept: GENERAL RADIOLOGY | Facility: CLINIC | Age: 64
End: 2024-02-16
Attending: ORTHOPAEDIC SURGERY
Payer: MEDICARE

## 2024-02-16 VITALS — HEIGHT: 70 IN | WEIGHT: 194 LBS | BODY MASS INDEX: 27.77 KG/M2

## 2024-02-16 DIAGNOSIS — S91.309A NONHEALING WOUND OF HEEL: ICD-10-CM

## 2024-02-16 DIAGNOSIS — M79.671 RIGHT FOOT PAIN: ICD-10-CM

## 2024-02-16 DIAGNOSIS — M65.10 ACHILLES TENDON INFECTION: Primary | ICD-10-CM

## 2024-02-16 DIAGNOSIS — M79.89 OTHER SPECIFIED SOFT TISSUE DISORDERS: ICD-10-CM

## 2024-02-16 PROCEDURE — 73630 X-RAY EXAM OF FOOT: CPT | Mod: RT | Performed by: RADIOLOGY

## 2024-02-16 PROCEDURE — 99214 OFFICE O/P EST MOD 30 MIN: CPT | Performed by: ORTHOPAEDIC SURGERY

## 2024-02-16 NOTE — PROGRESS NOTES
Teaching Flowsheet   Relevant Diagnosis: Below the Knee Amputation  Teaching Topic: Surgery     Person(s) involved in teaching:   Patient     Motivation Level:  Asks Questions: Yes  Eager to Learn: Yes  Cooperative: Yes  Receptive (willing/able to accept information): Yes  Any cultural factors/Religion beliefs that may influence understanding or compliance? No       Patient demonstrates understanding of the following:  Reason for the appointment, diagnosis and treatment plan: Yes  Knowledge of proper use of medications and conditions for which they are ordered (with special attention to potential side effects or drug interactions): Yes  Which situations necessitate calling provider and whom to contact: Yes       Teaching Concerns Addressed:        Proper use and care of medication (medical equip, care aids, etc.): Yes  Nutritional needs and diet plan: Yes  Pain management techniques: Yes  Wound Care: Yes  How and/when to access community resources: Yes     Instructional Materials Used/Given: pre op packet, soap      Time spent with patient: 15 minutes.    Melissa Zavaleta RN

## 2024-02-16 NOTE — LETTER
2/16/2024         RE: Erwin Aguilar  [address]  [place] [address]        Dear Colleague,    Thank you for referring your patient, Erwin Aguilar, to the [facility] [place]. Please see a copy of my visit note below.    Orthopaedic Surgery Clinic - New Patient Appointment    Chief complaint:  Requesting right below-knee amputation.    History of present illness:  I had the opportunity to meet this pleasant 63-year-old patient today as new patient to me, for the chief complaint of requesting a right below-knee amputation.  The patient's history is obtained through interview with the patient today as well as through chart review including Dr. Carlito Grimm's very thorough recent summary.  Mr. Aguilar appears to have had a longstanding history of pain in the right lower extremity that he states started with an injury in the late 1990s with what appears to have been a sacral or coccygeal fracture.  He has had subsequent radicular symptoms and has undergone multiple procedures including spinal cord stimulators to alleviate his pain.  He at one point developed cauda equina syndrome after a spinal procedure.  He subsequently developed a heel ulcer on the posterior right heel by the Achilles insertion and underwent a insertional reconstruction by Dr. Fritz around 2020.  The patient states that very soon after this he developed an infection and open wound there, and in the almost 4 years since that surgery he states that there have been multiple episodes of dehiscence followed by healing, more times than he can recall.  He states that this can occur even with him just gently rubbing the area to wash it.  He currently has a scab there now.  He does endorse chills.  He rates his pain as 10+ out of 10 in severity.  He localizes the pain in his right lower extremity from the knee on down.  These areas can include the heel and the Achilles.  He does endorse numbness and weakness in the  right lower extremity due to the spine problems and states he has very little if any active motion in his right foot.  He reports numbness in bilateral lower extremities and a history of diabetic foot ulcers in both feet.  He reports having had conversations with his family and friends about amputation, and states he wants to proceed with a right below-knee amputation at this time.  He endorses the use of a cane and a wheelchair to help him ambulate.  He states that he just tends to go through the pain and denies any limits in distance on ambulation.    Past medical history:  Mr. Aguilar  has a past medical history of Actinic keratosis, Anxiety (), Cancer (H), Cauda equina spinal cord injury (H), Chronic sinusitis (5-1-16), Depressive disorder, Diastasis recti, Esophageal reflux, Esophageal varices in cirrhosis (H) (4/1/2014), Essential hypertension, benign, Intermittent asthma, Mild depression, Mixed hyperlipidemia, Nasal polyps (5-1-16), Osteoarthritis of lumbar spine, unspecified spinal osteoarthritis complication status, Other chronic pain, Paroxysmal atrial fibrillation (H) (9/22/2021), SCCA (squamous cell carcinoma) of skin, Seasonal allergic conjunctivitis, Type II or unspecified type diabetes mellitus without mention of complication, not stated as uncontrolled, and Unspecified site of spinal cord injury without evidence of spinal bone injury.    He has no past medical history of Cerebral infarction (H), Chronic infection, Congestive heart failure (H), COPD (chronic obstructive pulmonary disease) (H), History of blood transfusion, PONV (postoperative nausea and vomiting), or Thyroid disease.    Social history:  Mr. Aguilar reports that he quit smoking about 39 years ago. His smoking use included cigarettes. He started smoking about 40 years ago. He has been exposed to tobacco smoke. He has never used smokeless tobacco. He reports that he does not currently use alcohol. He reports current drug use.  "Frequency: 2.00 times per week. Drug: Marijuana.    Allergies:  Allergies   Allergen Reactions    Levaquin Anaphylaxis and Rash     Swelling in lip and tongue felt thick    Lisinopril Anaphylaxis     Swollen tongue; inability to swallow; drooling; hives; swollen face, neck, angioedema    Acetaminophen      Hx of cirrhosis     Amlodipine      Swelling, hives, possible angioedema      Morphine      b/p dropped and arms went numb  Hypotension    Quinolones Hives    Spironolactone Unknown     Pt believes himself to be allergic to it; cannot find his old records of this.-c     Bactrim [Sulfamethoxazole-Trimethoprim] Rash     Examination:  Recorded Estimated body mass index is 27.84 kg/m  as calculated from the following:    Height as of this encounter: 1.778 m (5' 10\").    Weight as of this encounter: 88 kg (194 lb).  Examination today shows the patient to be able, cooperative, awake, alert adult sitting upright in a wheelchair in no acute distress.  Breathing pattern is regular and nonlabored on room air.  He has a cane with him.  He is nonseptic appearing from a general clinical standpoint.  Normal shoewear, removed for examination of the right foot.  There is an easily palpable right dorsalis pedis pulse and a nonpalpable right posterior tib pulse.  There is endorsed tenderness to palpation in the right Achilles where there is a visible dry wound with an eschar.  There is no active or expressible drainage.  The right Achilles insertion is diffusely tender to palpation.  There is visible swelling in the area.  There is a negative Tinel's at the tibial and sural nerves.  There is tenderness endorsed with a medial lateral squeeze test of the right calcaneal tuberosity as well as over the medial flexor tendons.  The peroneal tendons are nontender to palpation.  Ankle range of motion is stiff with passive dorsiflexion to approximately 10 degrees above neutral with the knee extended, with an additional 5 degrees of " dorsiflexion with the knee flexed, and plantarflexion to approximately 10 degrees below neutral.  There is very little if any discernible inversion or eversion present.  The right foot is plantigrade.  The right foot is endorsed as numb to light touch in all dermatomes, and appears insensate to a Liberal-Tacho 5.07 monofilament in any of the tested areas in the right forefoot.  There is no visible plantar foot ulceration at this time.  There is a dry scab on the dorsal aspect of the right hallux.  There are multiple fixed claw toe deformities.  Motor strength examination suggests tibialis anterior 1/5; gastrocsoleus, peroneals, and PTT 0/5; and EHL and EDL 2/5.    Imaging:  Independent review of imaging was performed including AP, oblique, and lateral right foot radiographs dated today, 02/16/2024.  Images were discussed with the patient.  There is evidence suggesting previous right insertional Achilles instruction with resection of the superior portion of the calcaneal tuberosity.  Soft tissue shadow suggests swelling in or around the Achilles.  No obvious evidence for osteomyelitis or acute fracture at this time.  Scattered degenerative changes.  Suggestion of cavus foot with approximately 17 degree Meary's angle apex dorsal, approximately 24 degree calcaneal pitch.    Impression:  63-year-old gentleman with longstanding history of right lower extremity weakness, numbness, stiffness, pain, dysfunction, deformity, and intermittently open Achilles wound who wishes to proceed with a right transtibial amputation (right below knee amputation).    Plan:  I discussed the diagnosis, prognosis, and treatment options with the patient, including both operative and nonoperative options. Full opportunity was given to Mr. Aguilar to participate in the shared decision-making process. The risks of nonoperative treatment were also discussed.  I discussed that one reasonable option at this time would be to proceed with a right  transtibial amputation as requested.  The nature of the proposed surgery, the postoperative plan, the risks, benefits, and alternatives to surgery, and realistic expectations for short and long term outcome were all discussed in layman's terms.  The risks which were discussed included but were not necessarliy limited to: failure to achieve the intended result, infection, neurovascular injury, phantom limb pain, wound necrosis, need for revision of the amputation, prominent bone, pain in the residual limb, reoperation or reoperations for other reasons, chronic pain, venous thromboembolic disease, other medical complications, and even death.  I discussed that besides the right foot, ankle, and lower half to two-thirds of the leg between the knee and the ankle, there appear to be multiple other sources of pain in his right lower extremity that will likely still be there after amputation of his right foot, ankle, and lower half to two-thirds of his leg between the knee and the ankle.  No guarantees as to outcome were expressed or implied.  I will keep Drs. Wegener and Sirisha updated about our decision, and also pass along Dr. Grimm's recommendation to the regional anesthesia team that the patient receive a continuous infusion epidural catheter for postop pain.  It would also be prudent to obtain TcPO2s, ABIs and an arterial ultrasound to rule out any optimization that might need to be achieved preoperatively.  Mr. Aguilar verbalized understanding of this discussion, and verbalized his wish to proceed with the surgery as discussed above.  All questions this very pleasant patient had at the time were answered.    Teaching Flowsheet   Relevant Diagnosis: Below the Knee Amputation  Teaching Topic: Surgery     Person(s) involved in teaching:   Patient     Motivation Level:  Asks Questions: Yes  Eager to Learn: Yes  Cooperative: Yes  Receptive (willing/able to accept information): Yes  Any cultural factors/Faith beliefs  that may influence understanding or compliance? No       Patient demonstrates understanding of the following:  Reason for the appointment, diagnosis and treatment plan: Yes  Knowledge of proper use of medications and conditions for which they are ordered (with special attention to potential side effects or drug interactions): Yes  Which situations necessitate calling provider and whom to contact: Yes       Teaching Concerns Addressed:        Proper use and care of medication (medical equip, care aids, etc.): Yes  Nutritional needs and diet plan: Yes  Pain management techniques: Yes  Wound Care: Yes  How and/when to access community resources: Yes     Instructional Materials Used/Given: pre op packet, soap      Time spent with patient: 15 minutes.    RODERICK hTeodore MD

## 2024-02-16 NOTE — NURSING NOTE
Reason for visit:   Chief Complaint   Patient presents with    Consult     New patient consult for chronically non-healing right posterior/distal Achilles wound.  DOS: 11/11/2020 (Sandoval).  Patient states he has re-torn the Achilles' twice since that surgery.  He is mostly NWB.  Has previously seen Dr. Anaya for the wound and has long been seeing the pain clinic.       Patient pre-surgery profile    Non-operative treatments:  Injections - NA  Bracing - NA  Medications - Yes - may be getting fentanyl pump with Dr. Grimm  Physical Therapy - NA    Social considerations:  Living situation:  Lives with his partner, she is disabled and he is her caretaker.  Friends/Family nearby:  No  Reliable transportation to and from appointments: Rides electric bike / Uber  Hobbies/recreational activities:  Tobacco/Nicotine use:  No  Alcohol/illicit drug use:  No    BMI   Body mass index is 27.84 kg/m .                HOOS Hip Dysfunction & Osteoarthritis Outcome Questionnaire         No data to display                 KOOS Knee Survey Assessment        9/23/2015     7:00 AM   Knee Outcome Survey ADL Scale (Emir, KASSIE; Vitaliy L; Aric, RS; Jerson, FH; Miguel, CD; 1998)   Pain (ADLS1) 1   Stiffness (ADLS2) 1   Swelling (ADLS3) 1   Giving Way, Buckling or Shifting of Knee (ADLS4) -1   Weakness (ADLS5) 1   Limping (ADLS6) 1   Walk? (ADLS7) 1   Go up stairs? (ADLS8) 0   Go down stairs? (ADLS9) 1   Stand? (ADLS10) 2        Past Medical History  Past Medical History:   Diagnosis Date    Actinic keratosis     aldara    Anxiety     Cancer (H)     squamous cell skin CA    Cauda equina spinal cord injury (H)     Chronic sinusitis 5-1-16    Depressive disorder     Diastasis recti     Esophageal reflux     Esophageal varices in cirrhosis (H) 4/1/2014    Hospitalized for UGI blee 3/28/14, endoscopy revealed bleeding varices.    Essential hypertension, benign     Intermittent asthma     Mild depression     Mixed hyperlipidemia      Nasal polyps 5-1-16    Osteoarthritis of lumbar spine, unspecified spinal osteoarthritis complication status     Other chronic pain     Paroxysmal atrial fibrillation (H) 9/22/2021    SCCA (squamous cell carcinoma) of skin     Seasonal allergic conjunctivitis     Type II or unspecified type diabetes mellitus without mention of complication, not stated as uncontrolled     Unspecified site of spinal cord injury without evidence of spinal bone injury     due to back surgery     Past Surgical History:   Procedure Laterality Date    ABDOMEN SURGERY  2014    BACK SURGERY  August 2009    COLONOSCOPY N/A 5/12/2016    Procedure: COMBINED COLONOSCOPY, SINGLE OR MULTIPLE BIOPSY/POLYPECTOMY BY BIOPSY;  Surgeon: Ana Paula Urbina MD;  Location: [facility]    DECOMPRESSION CUBITAL TUNNEL Left 8/31/2023    Procedure: LEFT CUBITAL TUNNEL RELEASE,;  Surgeon: Deny Dixon MD;  [place] [facility] [place]    ENDOSCOPY UPPER, COLONOSCOPY, COMBINED  10/19/2011    Procedure:COMBINED ENDOSCOPY UPPER, COLONOSCOPY; Upper Endoscopy, Colonoscopy with Polypectomy x4; Surgeon:AMBAR RODRÍGUEZ; Location:[facility] OR    ENT SURGERY  1-2016    Ongoing since then    ESOPHAGOSCOPY, GASTROSCOPY, DUODENOSCOPY (EGD), COMBINED  3/28/2014    Procedure: COMBINED ESOPHAGOSCOPY, GASTROSCOPY, DUODENOSCOPY (EGD);  EGD, Gastric Biopsies, Esophageal Banding;  Surgeon: Reyna Tovar MD;  Location:  OR    ESOPHAGOSCOPY, GASTROSCOPY, DUODENOSCOPY (EGD), COMBINED  6/9/2014    Procedure: COMBINED ESOPHAGOSCOPY, GASTROSCOPY, DUODENOSCOPY (EGD);  Surgeon: Curtis Mendez MD;  Location:  GI    ESOPHAGOSCOPY, GASTROSCOPY, DUODENOSCOPY (EGD), COMBINED  7/24/2014    Procedure: COMBINED ESOPHAGOSCOPY, GASTROSCOPY, DUODENOSCOPY (EGD);  Surgeon: Gerard Samaniego MD;  Location:  OR    ESOPHAGOSCOPY, GASTROSCOPY, DUODENOSCOPY (EGD), COMBINED N/A 10/31/2014    Procedure: COMBINED ESOPHAGOSCOPY, GASTROSCOPY, DUODENOSCOPY (EGD);  Surgeon: Gerard Samaniego MD;   Location: [facility] OR    ESOPHAGOSCOPY, GASTROSCOPY, DUODENOSCOPY (EGD), COMBINED N/A 5/12/2016    Procedure: COMBINED ESOPHAGOSCOPY, GASTROSCOPY, DUODENOSCOPY (EGD);  Surgeon: Ana Paula Urbina MD;  Location: [facility]    ESOPHAGOSCOPY, GASTROSCOPY, DUODENOSCOPY (EGD), COMBINED N/A 8/2/2018    Procedure: COMBINED ESOPHAGOSCOPY, GASTROSCOPY, DUODENOSCOPY (EGD);  EGD;  Surgeon: Yu Wagner MD;  Location: [facility]    HCL SQUAMOUS CELL CARCINOMA AG  10/07    left forearm    HERNIORRHAPHY UMBILICAL  11/8/2012    Procedure: HERNIORRHAPHY UMBILICAL;  Open Umbilical Hernia Repair With Mesh ;  Surgeon: Chase Nicholson MD;  Location: [facility] OR    INSERT STIMULATOR DORSAL COLUMN N/A 6/28/2018    Procedure: INSERT STIMULATOR DORSAL COLUMN;;  Surgeon: Elvis Sauceda MD;  Location: [facility]    neuro stimulator  2010    RELEASE CARPAL TUNNEL Left 8/31/2023    Procedure: LEFT OPEN CARPAL TUNNEL RELEASE,;  Surgeon: Deny Dixon MD;  Location: [facility] OR    RELEASE TRIGGER FINGER Left 8/31/2023    Procedure: Release left trigger finger thumb;  Surgeon: Deny Dixon MD;  Location: [facility] OR    REMOVE GENERATOR STIMULATOR (LOCATION) N/A 6/28/2018    Procedure: REMOVE GENERATOR STIMULATOR (LOCATION);  Spinal Cord Stimulator and IPG Explant and Re-Implant of SCS System (Leads and IPG);  Surgeon: Elvis Sauceda MD;  Location: [facility]    REPAIR TENDON ACHILLES Right 11/11/2020    Procedure: Right achilles debridement and partial calcaneus excision;  Surgeon: Eh Sandoval MD;  Location: [facility] OR    SURGICAL HISTORY OF -   1/02    abcess tooth    SURGICAL HISTORY OF -   1999    L4-5 laminectomy, cauda equina syndrome    SURGICAL HISTORY OF -   +    herniated disk repair    TONSILLECTOMY  10 1964    TRANSPOSITION ULNAR NERVE (ELBOW)      right    ZZC APPENDECTOMY  1974     ACE/ARB NOT PRESCRIBED, INTENTIONAL,  albuterol (PROAIR HFA/PROVENTIL HFA/VENTOLIN HFA) 108 (90 Base) MCG/ACT  inhaler  apixaban ANTICOAGULANT (ELIQUIS ANTICOAGULANT) 5 MG tablet  baclofen (LIORESAL) 10 MG tablet  Continuous Blood Gluc  (FREESTYLE CACHORRO 2 READER) GASTON  Continuous Blood Gluc Sensor (FREESTYLE CACHORRO 2 SENSOR) MISC  doxazosin (CARDURA) 8 MG tablet  gabapentin (NEURONTIN) 300 MG capsule  insulin glargine (LANTUS SOLOSTAR) 100 UNIT/ML pen  insulin pen needle (GLOBAL EASE INJECT PEN NEEDLES) 32G X 4 MM miscellaneous  medical cannabis (Patient's own supply)  oxyCODONE (ROXICODONE) 5 MG tablet  promethazine (PHENERGAN) 25 MG tablet  propranolol (INDERAL) 40 MG tablet  rosuvastatin (CRESTOR) 20 MG tablet  sertraline (ZOLOFT) 100 MG tablet  triamterene-HCTZ (DYAZIDE) 37.5-25 MG capsule      Allergies   Allergen Reactions    Levaquin Anaphylaxis and Rash     Swelling in lip and tongue felt thick    Lisinopril Anaphylaxis     Swollen tongue; inability to swallow; drooling; hives; swollen face, neck, angioedema    Acetaminophen      Hx of cirrhosis     Amlodipine      Swelling, hives, possible angioedema      Morphine      b/p dropped and arms went numb  Hypotension    Quinolones Hives    Spironolactone Unknown     Pt believes himself to be allergic to it; cannot find his old records of this.-mjc     Bactrim [Sulfamethoxazole-Trimethoprim] Rash     Family History  Family History   Problem Relation Age of Onset    Cancer Mother         lung    Breast Cancer Mother     Other Cancer Mother     Cancer Father         esophogeal, GERD    Snoring Father     Diabetes Brother         amputation, Type 1    Diabetes Brother     Diabetes Brother     Cancer - colorectal No family hx of     Glaucoma No family hx of     Macular Degeneration No family hx of     Anesthesia Reaction No family hx of     Venous thrombosis No family hx of      Social History   Social History     Tobacco Use    Smoking status: Former     Packs/day: 0.00     Years: 1.00     Additional pack years: 0.00     Total pack years: 0.00     Types: Cigarettes      Start date: 10/13/1983     Quit date: 1984     Years since quittin.7     Passive exposure: Past    Smokeless tobacco: Never   Vaping Use    Vaping Use: Every day    Substances: THC   Substance Use Topics    Alcohol use: Not Currently     Comment: a pint of alcohol / day - last drink was 3/28/14    Drug use: Yes     Frequency: 2.0 times per week     Types: Marijuana     Comment: medical marijuana - vape daily

## 2024-02-16 NOTE — PROGRESS NOTES
[facility] - New Patient Appointment    Chief complaint:  Requesting right below-knee amputation.    History of present illness:  I had the opportunity to meet this pleasant 63-year-old patient today as new patient to me, for the chief complaint of requesting a right below-knee amputation.  The patient's history is obtained through interview with the patient today as well as through chart review including Dr. Carlito Grimm's very thorough recent summary.  Mr. Aguilar appears to have had a longstanding history of pain in the right lower extremity that he states started with an injury in the late 1990s with what appears to have been a sacral or coccygeal fracture.  He has had subsequent radicular symptoms and has undergone multiple procedures including spinal cord stimulators to alleviate his pain.  He at one point developed cauda equina syndrome after a spinal procedure.  He subsequently developed a heel ulcer on the posterior right heel by the Achilles insertion and underwent a insertional reconstruction by Dr. Fritz around 2020.  The patient states that very soon after this he developed an infection and open wound there, and in the almost 4 years since that surgery he states that there have been multiple episodes of dehiscence followed by healing, more times than he can recall.  He states that this can occur even with him just gently rubbing the area to wash it.  He currently has a scab there now.  He does endorse chills.  He rates his pain as 10+ out of 10 in severity.  He localizes the pain in his right lower extremity from the knee on down.  These areas can include the heel and the Achilles.  He does endorse numbness and weakness in the right lower extremity due to the spine problems and states he has very little if any active motion in his right foot.  He reports numbness in bilateral lower extremities and a history of diabetic foot ulcers in both feet.  He reports having had conversations with his  family and friends about amputation, and states he wants to proceed with a right below-knee amputation at this time.  He endorses the use of a cane and a wheelchair to help him ambulate.  He states that he just tends to go through the pain and denies any limits in distance on ambulation.    Past medical history:  Mr. Aguilar  has a past medical history of Actinic keratosis, Anxiety (), Cancer (H), Cauda equina spinal cord injury (H), Chronic sinusitis (5-1-16), Depressive disorder, Diastasis recti, Esophageal reflux, Esophageal varices in cirrhosis (H) (4/1/2014), Essential hypertension, benign, Intermittent asthma, Mild depression, Mixed hyperlipidemia, Nasal polyps (5-1-16), Osteoarthritis of lumbar spine, unspecified spinal osteoarthritis complication status, Other chronic pain, Paroxysmal atrial fibrillation (H) (9/22/2021), SCCA (squamous cell carcinoma) of skin, Seasonal allergic conjunctivitis, Type II or unspecified type diabetes mellitus without mention of complication, not stated as uncontrolled, and Unspecified site of spinal cord injury without evidence of spinal bone injury.    He has no past medical history of Cerebral infarction (H), Chronic infection, Congestive heart failure (H), COPD (chronic obstructive pulmonary disease) (H), History of blood transfusion, PONV (postoperative nausea and vomiting), or Thyroid disease.    Social history:  Mr. Aguilar reports that he quit smoking about 39 years ago. His smoking use included cigarettes. He started smoking about 40 years ago. He has been exposed to tobacco smoke. He has never used smokeless tobacco. He reports that he does not currently use alcohol. He reports current drug use. Frequency: 2.00 times per week. Drug: Marijuana.    Allergies:  Allergies   Allergen Reactions    Levaquin Anaphylaxis and Rash     Swelling in lip and tongue felt thick    Lisinopril Anaphylaxis     Swollen tongue; inability to swallow; drooling; hives; swollen face, neck,  "angioedema    Acetaminophen      Hx of cirrhosis     Amlodipine      Swelling, hives, possible angioedema      Morphine      b/p dropped and arms went numb  Hypotension    Quinolones Hives    Spironolactone Unknown     Pt believes himself to be allergic to it; cannot find his old records of this[facility]     Bactrim [Sulfamethoxazole-Trimethoprim] Rash     Examination:  Recorded Estimated body mass index is 27.84 kg/m  as calculated from the following:    Height as of this encounter: 1.778 m (5' 10\").    Weight as of this encounter: 88 kg (194 lb).  Examination today shows the patient to be able, cooperative, awake, alert adult sitting upright in a wheelchair in no acute distress.  Breathing pattern is regular and nonlabored on room air.  He has a cane with him.  He is nonseptic appearing from a general clinical standpoint.  Normal shoewear, removed for examination of the right foot.  There is an easily palpable right dorsalis pedis pulse and a nonpalpable right posterior tib pulse.  There is endorsed tenderness to palpation in the right Achilles where there is a visible dry wound with an eschar.  There is no active or expressible drainage.  The right Achilles insertion is diffusely tender to palpation.  There is visible swelling in the area.  There is a negative Tinel's at the tibial and sural nerves.  There is tenderness endorsed with a medial lateral squeeze test of the right calcaneal tuberosity as well as over the medial flexor tendons.  The peroneal tendons are nontender to palpation.  Ankle range of motion is stiff with passive dorsiflexion to approximately 10 degrees above neutral with the knee extended, with an additional 5 degrees of dorsiflexion with the knee flexed, and plantarflexion to approximately 10 degrees below neutral.  There is very little if any discernible inversion or eversion present.  The right foot is plantigrade.  The right foot is endorsed as numb to light touch in all dermatomes, and " appears insensate to a Bagley-Tacho 5.07 monofilament in any of the tested areas in the right forefoot.  There is no visible plantar foot ulceration at this time.  There is a dry scab on the dorsal aspect of the right hallux.  There are multiple fixed claw toe deformities.  Motor strength examination suggests tibialis anterior 1/5; gastrocsoleus, peroneals, and PTT 0/5; and EHL and EDL 2/5.    Imaging:  Independent review of imaging was performed including AP, oblique, and lateral right foot radiographs dated today, 02/16/2024.  Images were discussed with the patient.  There is evidence suggesting previous right insertional Achilles instruction with resection of the superior portion of the calcaneal tuberosity.  Soft tissue shadow suggests swelling in or around the Achilles.  No obvious evidence for osteomyelitis or acute fracture at this time.  Scattered degenerative changes.  Suggestion of cavus foot with approximately 17 degree Meary's angle apex dorsal, approximately 24 degree calcaneal pitch.    Impression:  63-year-old gentleman with longstanding history of right lower extremity weakness, numbness, stiffness, pain, dysfunction, deformity, and intermittently open Achilles wound who wishes to proceed with a right transtibial amputation (right below knee amputation).    Plan:  I discussed the diagnosis, prognosis, and treatment options with the patient, including both operative and nonoperative options. Full opportunity was given to Mr. Aguilar to participate in the shared decision-making process. The risks of nonoperative treatment were also discussed.  I discussed that one reasonable option at this time would be to proceed with a right transtibial amputation as requested.  The nature of the proposed surgery, the postoperative plan, the risks, benefits, and alternatives to surgery, and realistic expectations for short and long term outcome were all discussed in layman's terms.  The risks which were discussed  included but were not necessarliy limited to: failure to achieve the intended result, infection, neurovascular injury, phantom limb pain, wound necrosis, need for revision of the amputation, prominent bone, pain in the residual limb, reoperation or reoperations for other reasons, chronic pain, venous thromboembolic disease, other medical complications, and even death.  I discussed that besides the right foot, ankle, and lower half to two-thirds of the leg between the knee and the ankle, there appear to be multiple other sources of pain in his right lower extremity that will likely still be there after amputation of his right foot, ankle, and lower half to two-thirds of his leg between the knee and the ankle.  No guarantees as to outcome were expressed or implied.  I will keep Drs. Wegener and Sirisha updated about our decision, and also pass along Dr. Grimm's recommendation to the regional anesthesia team that the patient receive a continuous infusion epidural catheter for postop pain.  It would also be prudent to obtain TcPO2s, ABIs and an arterial ultrasound to rule out any optimization that might need to be achieved preoperatively.  Mr. Aguilar verbalized understanding of this discussion, and verbalized his wish to proceed with the surgery as discussed above.  All questions this very pleasant patient had at the time were answered.

## 2024-02-17 ENCOUNTER — MYC MEDICAL ADVICE (OUTPATIENT)
Dept: FAMILY MEDICINE | Facility: CLINIC | Age: 64
End: 2024-02-17
Payer: MEDICARE

## 2024-02-27 ENCOUNTER — MYC MEDICAL ADVICE (OUTPATIENT)
Dept: FAMILY MEDICINE | Facility: CLINIC | Age: 64
End: 2024-02-27
Payer: MEDICARE

## 2024-03-01 NOTE — TELEPHONE ENCOUNTER
FUTURE VISIT INFORMATION      SURGERY INFORMATION:  Date: 4/18/24  Location: [facility] or  Surgeon:  Kurtis Nye MD   Anesthesia Type:  general with block  Procedure: Right below knee amputation (transtibial amputation)   Consult: ov 2/16    RECORDS REQUESTED FROM:       Primary Care Provider: Wegener, Joel Daniel Irwin, MD - [facility]    Pertinent Medical History: hypertension, paroxysmal atrial fibrillation, transient ischemic attack    Most recent EKG+ Tracing: 10/19/21    Most recent ECHO: 9/7/21

## 2024-03-02 ENCOUNTER — MYC MEDICAL ADVICE (OUTPATIENT)
Dept: FAMILY MEDICINE | Facility: CLINIC | Age: 64
End: 2024-03-02
Payer: MEDICARE

## 2024-03-08 ENCOUNTER — VIRTUAL VISIT (OUTPATIENT)
Dept: FAMILY MEDICINE | Facility: CLINIC | Age: 64
End: 2024-03-08
Payer: MEDICARE

## 2024-03-08 DIAGNOSIS — M79.671 INTRACTABLE RIGHT HEEL PAIN: ICD-10-CM

## 2024-03-08 DIAGNOSIS — M25.571 CHRONIC PAIN OF RIGHT ANKLE: ICD-10-CM

## 2024-03-08 DIAGNOSIS — G89.29 CHRONIC PAIN OF RIGHT ANKLE: ICD-10-CM

## 2024-03-08 DIAGNOSIS — M21.6X1 ACQUIRED CALCANEUS DEFORMITY OF RIGHT ANKLE: ICD-10-CM

## 2024-03-08 DIAGNOSIS — K31.84 GASTROPARESIS: ICD-10-CM

## 2024-03-08 DIAGNOSIS — R11.0 NAUSEA WITHOUT VOMITING: ICD-10-CM

## 2024-03-08 DIAGNOSIS — S86.011A RUPTURE OF RIGHT ACHILLES TENDON, INITIAL ENCOUNTER: ICD-10-CM

## 2024-03-08 DIAGNOSIS — M65.28 CALCIFIC ACHILLES TENDINITIS: ICD-10-CM

## 2024-03-08 DIAGNOSIS — I10 HYPERTENSION GOAL BP (BLOOD PRESSURE) < 140/90: ICD-10-CM

## 2024-03-08 DIAGNOSIS — G89.4 CHRONIC PAIN SYNDROME: ICD-10-CM

## 2024-03-08 DIAGNOSIS — F10.21 ALCOHOLISM IN REMISSION (H): ICD-10-CM

## 2024-03-08 DIAGNOSIS — Z98.890 H/O FOOT SURGERY: ICD-10-CM

## 2024-03-08 DIAGNOSIS — K70.30 ALCOHOLIC CIRRHOSIS OF LIVER WITHOUT ASCITES (H): ICD-10-CM

## 2024-03-08 DIAGNOSIS — L97.313 SKIN ULCER OF RIGHT ANKLE WITH NECROSIS OF MUSCLE (H): ICD-10-CM

## 2024-03-08 DIAGNOSIS — I48.0 PAROXYSMAL ATRIAL FIBRILLATION (H): ICD-10-CM

## 2024-03-08 PROCEDURE — 99214 OFFICE O/P EST MOD 30 MIN: CPT | Mod: 95 | Performed by: FAMILY MEDICINE

## 2024-03-08 RX ORDER — OXYCODONE HYDROCHLORIDE 5 MG/1
5 TABLET ORAL 4 TIMES DAILY PRN
Qty: 120 TABLET | Refills: 0 | Status: SHIPPED | OUTPATIENT
Start: 2024-03-08 | End: 2024-04-08

## 2024-03-08 RX ORDER — PROMETHAZINE HYDROCHLORIDE 25 MG/1
TABLET ORAL
Qty: 270 TABLET | Refills: 1 | Status: SHIPPED | OUTPATIENT
Start: 2024-03-08 | End: 2024-05-30

## 2024-03-08 RX ORDER — PROPRANOLOL HYDROCHLORIDE 40 MG/1
40 TABLET ORAL 3 TIMES DAILY
Qty: 270 TABLET | Refills: 3 | Status: SHIPPED | OUTPATIENT
Start: 2024-03-08 | End: 2024-05-30

## 2024-03-08 ASSESSMENT — PAIN SCALES - PAIN ENJOYMENT GENERAL ACTIVITY SCALE (PEG)
AVG_PAIN_PASTWEEK: 9
INTERFERED_GENERAL_ACTIVITY: 8
PEG_TOTALSCORE: 8.67
PEG_TOTALSCORE: 8.67
AVG_PAIN_PASTWEEK: 9
INTERFERED_GENERAL_ACTIVITY: 8
INTERFERED_ENJOYMENT_LIFE: 9
INTERFERED_ENJOYMENT_LIFE: 9

## 2024-03-08 ASSESSMENT — PATIENT HEALTH QUESTIONNAIRE - PHQ9
SUM OF ALL RESPONSES TO PHQ QUESTIONS 1-9: 3
10. IF YOU CHECKED OFF ANY PROBLEMS, HOW DIFFICULT HAVE THESE PROBLEMS MADE IT FOR YOU TO DO YOUR WORK, TAKE CARE OF THINGS AT HOME, OR GET ALONG WITH OTHER PEOPLE: NOT DIFFICULT AT ALL
SUM OF ALL RESPONSES TO PHQ QUESTIONS 1-9: 3

## 2024-03-08 NOTE — PROGRESS NOTES
Erwin is a 63 year old who is being evaluated via a billable video visit.      How would you like to obtain your AVS? Rdhart  If the video visit is dropped, the invitation should be resent by: Text to cell phone: [phone]  Will anyone else be joining your video visit? No          Assessment & Plan     Chronic pain syndrome  Main reason for visit to renew medical cannabis certification and to update me on status.     Had ankle wound open again.  Reviewed with wound care/pain management/orthopedic team and planning on foot amputation now due to chronic disability/dysfunction and pain.  Very much looking forward to it!  Has pre-op scheduled.  Planning nerve block with sedation.     Plans to decrease medical marijuana/opiods after recovery from the surgery.     No suspicious activity on [place] rx monitoring database     Contract/utox/ryan/phq9 utd.     Peg score completed/average 7, down from prior to medical cannabis.     Will complete the medical cannabis renewal.     Oxycodone refill sent.  Follow up with myself 3 months as usual, earlier as needed.   - oxyCODONE (ROXICODONE) 5 MG tablet  Dispense: 120 tablet; Refill: 0    H/O foot surgery    - oxyCODONE (ROXICODONE) 5 MG tablet  Dispense: 120 tablet; Refill: 0    Intractable right heel pain    - oxyCODONE (ROXICODONE) 5 MG tablet  Dispense: 120 tablet; Refill: 0    Rupture of right Achilles tendon, initial encounter    - oxyCODONE (ROXICODONE) 5 MG tablet  Dispense: 120 tablet; Refill: 0    Paroxysmal atrial fibrillation (H)  Rate controlled and on anticoagulation.     Acquired calcaneus deformity of right ankle    - oxyCODONE (ROXICODONE) 5 MG tablet  Dispense: 120 tablet; Refill: 0    Chronic pain of right ankle    - oxyCODONE (ROXICODONE) 5 MG tablet  Dispense: 120 tablet; Refill: 0    Calcific Achilles tendinitis    - oxyCODONE (ROXICODONE) 5 MG tablet  Dispense: 120 tablet; Refill: 0    Skin ulcer of right ankle with necrosis of muscle (H)  Inciting factor for  re-evaluation.     Alcoholic cirrhosis of liver without ascites (H)  Follows with  hepatologist/has been improving.     Alcoholism in remission (H)  Continues in remission.     Hypertension goal BP (blood pressure) < 140/90  At goal.   - propranolol (INDERAL) 40 MG tablet  Dispense: 270 tablet; Refill: 3    Gastroparesis  Refill needed/given. Plans to have EKG at pre-op given possibility of prolonged QT with promethazine/ssri.   - promethazine (PHENERGAN) 25 MG tablet  Dispense: 270 tablet; Refill: 1    Nausea without vomiting    - promethazine (PHENERGAN) 25 MG tablet  Dispense: 270 tablet; Refill: 1                    Madeleine Hood is a 63 year old, presenting for the following health issues:  Recheck Medication (Propanolol, oxycodone, promethazine per Pt report)        3/8/2024     9:13 AM   Additional Questions   Roomed by RODERICK Malone     History of Present Illness       Back Pain:  He presents for follow up of back pain. Patient's back pain is a chronic problem.  Location of back pain:  Right lower back, right buttock and right hip  Description of back pain: cramping, gnawing, sharp, shooting and stabbing  Back pain spreads: right thigh, right knee and right foot    Since patient first noticed back pain, pain is: gradually worsening  Does back pain interfere with his job:  Yes       He eats 2-3 servings of fruits and vegetables daily.He consumes 1 sweetened beverage(s) daily.He exercises with enough effort to increase his heart rate 20 to 29 minutes per day.  He exercises with enough effort to increase his heart rate 5 days per week.   He is taking medications regularly.         3/10/2022    10:41 AM 3/8/2024    10:02 AM   PEG Score   PEG Total Score 8.67 7                   Objective    Vitals - Patient Reported  Systolic (Patient Reported): 127  Diastolic (Patient Reported): 71  Temperature (Patient Reported): 97.9  F (36.6  C)  Pulse (Patient Reported): 53  Pain Score: Extreme Pain (9)  Pain Loc: Foot (Pt  reports right foot)      Vitals:  No vitals were obtained today due to virtual visit.    Physical Exam   GENERAL: alert and no distress  EYES: Eyes grossly normal to inspection.  No discharge or erythema, or obvious scleral/conjunctival abnormalities.  RESP: No audible wheeze, cough, or visible cyanosis.    SKIN: Visible skin clear. No significant rash, abnormal pigmentation or lesions.  NEURO: Cranial nerves grossly intact.  Mentation and speech appropriate for age.  PSYCH: Appropriate affect, tone, and pace of words          Video-Visit Details    Type of service:  Video Visit   Video Start Time:  9:55  Video End Time: 10:12    Originating Location (pt. Location): Home    Distant Location (provider location):  On-site  Platform used for Video Visit: Kameron  Signed Electronically by: Joel Daniel Wegener, MD

## 2024-03-11 ENCOUNTER — MYC MEDICAL ADVICE (OUTPATIENT)
Dept: ORTHOPEDICS | Facility: CLINIC | Age: 64
End: 2024-03-11
Payer: MEDICARE

## 2024-03-12 NOTE — TELEPHONE ENCOUNTER
ATC replied to this patient's additional follow up message on this topic.    Elvis Foster MS, ATC, LAT, [facility]  Dr. Gaudencio Nye

## 2024-03-14 ENCOUNTER — MYC MEDICAL ADVICE (OUTPATIENT)
Dept: FAMILY MEDICINE | Facility: CLINIC | Age: 64
End: 2024-03-14
Payer: MEDICARE

## 2024-03-14 DIAGNOSIS — S86.011A RUPTURE OF RIGHT ACHILLES TENDON, INITIAL ENCOUNTER: ICD-10-CM

## 2024-03-14 DIAGNOSIS — M21.6X1 ACQUIRED CALCANEUS DEFORMITY OF RIGHT ANKLE: ICD-10-CM

## 2024-03-14 DIAGNOSIS — Z98.890 H/O FOOT SURGERY: ICD-10-CM

## 2024-03-14 DIAGNOSIS — M65.28 CALCIFIC ACHILLES TENDINITIS: ICD-10-CM

## 2024-03-14 DIAGNOSIS — G89.4 CHRONIC PAIN SYNDROME: ICD-10-CM

## 2024-03-14 DIAGNOSIS — M25.571 CHRONIC PAIN OF RIGHT ANKLE: ICD-10-CM

## 2024-03-14 DIAGNOSIS — M79.671 INTRACTABLE RIGHT HEEL PAIN: ICD-10-CM

## 2024-03-14 DIAGNOSIS — G89.29 CHRONIC PAIN OF RIGHT ANKLE: ICD-10-CM

## 2024-03-15 RX ORDER — OXYCODONE HYDROCHLORIDE 10 MG/1
10 TABLET ORAL DAILY PRN
Qty: 30 TABLET | Refills: 0 | Status: SHIPPED | OUTPATIENT
Start: 2024-03-15 | End: 2024-04-22

## 2024-03-19 ENCOUNTER — PATIENT OUTREACH (OUTPATIENT)
Dept: GASTROENTEROLOGY | Facility: CLINIC | Age: 64
End: 2024-03-19
Payer: MEDICARE

## 2024-03-19 DIAGNOSIS — Z12.11 SPECIAL SCREENING FOR MALIGNANT NEOPLASMS, COLON: Primary | ICD-10-CM

## 2024-03-19 NOTE — PROGRESS NOTES
"Hx adenomatous polyps. Overdue since 2019  CRC Screening Colonoscopy Referral Review    Patient meets the inclusion criteria for screening colonoscopy standing order.    Ordering/Referring Provider:  Wegener, Joel Daniel Irwin    BMI: Estimated body mass index is 27.84 kg/m  as calculated from the following:    Height as of 2/16/24: 1.778 m (5' 10\").    Weight as of 2/16/24: 88 kg (194 lb).     Sedation:  Does patient have any of the following conditions affecting sedation?  Chronic or scheduled pain/narcotic medication use: MAC sedation recommended    Previous Scopes:  Any previous recommendations or follow up needs based on previous scope?  na / No recommendations.    Medical Concerns to Postpone Order:  Does patient have any of the following medical concerns that should postpone/delay colonoscopy referral?  No medical conditions affecting colonoscopy referral.    Final Referral Details:  Based on patient's medical history patient is appropriate for referral order with MAC/deep sedation.   BMI<= 45 45 < BMI <= 48 48 < BMI < = 50  BMI > 50   No Restrictions No [facility]  No [facility]  [facility] with exceptions Hospital Only OR Only     "

## 2024-03-21 ENCOUNTER — TELEPHONE (OUTPATIENT)
Dept: SURGERY | Facility: CLINIC | Age: 64
End: 2024-03-21
Payer: MEDICARE

## 2024-03-21 NOTE — PROGRESS NOTES
Writer sent a message to the surgeon, Dr. yNe, Dr. Joel Wegener, and JAMI Haq in PAC regarding plan for Eliquis. DOS is 4/18/24.

## 2024-03-25 ENCOUNTER — MYC REFILL (OUTPATIENT)
Dept: FAMILY MEDICINE | Facility: CLINIC | Age: 64
End: 2024-03-25
Payer: MEDICARE

## 2024-03-25 ENCOUNTER — TELEPHONE (OUTPATIENT)
Dept: GASTROENTEROLOGY | Facility: CLINIC | Age: 64
End: 2024-03-25
Payer: MEDICARE

## 2024-03-25 DIAGNOSIS — E78.5 HYPERLIPIDEMIA LDL GOAL <100: ICD-10-CM

## 2024-03-25 RX ORDER — ROSUVASTATIN CALCIUM 20 MG/1
20 TABLET, COATED ORAL DAILY
Qty: 90 TABLET | Refills: 3 | Status: ON HOLD | OUTPATIENT
Start: 2024-03-25 | End: 2024-04-24

## 2024-03-25 NOTE — TELEPHONE ENCOUNTER
Spoke with pt. Completed questionnaire. Appropriate appt not available. Writer will follow up tomorrow.

## 2024-03-25 NOTE — TELEPHONE ENCOUNTER
"Endoscopy Scheduling Screen    Have you had a positive Covid test in the last 14 days?  No    What is your communication preference for Instructions and/or Bowel Prep?   MyChart    What insurance is in the chart?  Other:  Medicare    Ordering/Referring Provider: Wegener, Joel Daniel Irwin, MD     (If ordering provider performs procedure, schedule with ordering provider unless otherwise instructed. )    BMI: Estimated body mass index is 27.84 kg/m  as calculated from the following:    Height as of 2/16/24: 1.778 m (5' 10\").    Weight as of 2/16/24: 88 kg (194 lb).     Sedation Ordered  MAC/deep sedation.   BMI<= 45 45 < BMI <= 48 48 < BMI < = 50  BMI > 50   No Restrictions No [facility]  No [facility]  [facility] with exceptions Hospital Only OR Only       Do you have a history of malignant hyperthermia?  No    (Females) Are you currently pregnant?   No     Have you been diagnosed or told you have pulmonary hypertension?   No    Do you have an LVAD?  No    Have you been told you have moderate to severe sleep apnea?  No    Have you been told you have COPD, asthma, or any other lung disease?  Yes     What breathing problems do you have?  Asthma     Do you use home oxygen?  No    Have your breathing problems required an ED visit or hospitalization in the last year?  No    Do you have any heart conditions?  No     Have you ever had or are you waiting for an organ transplant?  No. Continue scheduling, no site restrictions.    Have you had a stroke or transient ischemic attack (TIA aka \"mini stroke\" in the last 6 months?   No    Have you been diagnosed with or been told you have cirrhosis of the liver?   Yes (RN Review required for scheduling unless scheduling in Hospital.)    Are you currently on dialysis?   No    Do you need assistance transferring?   Yes (Hospital Only)    BMI: Estimated body mass index is 27.84 kg/m  as calculated from the following:    Height as of 2/16/24: 1.778 m (5' 10\").    Weight as of 2/16/24: 88 kg " (194 lb).     Is patients BMI > 40 and scheduling location [facility]?  No    Do you take an injectable medication for weight loss or diabetes (excluding insulin)?  No    Do you take the medication Naltrexone?  No    Do you take blood thinners?  No       Prep   Are you currently on dialysis or do you have chronic kidney disease?  No    Do you have a diagnosis of diabetes?  Yes (Golytely Prep)    Do you have a diagnosis of cystic fibrosis (CF)?  No    On a regular basis do you go 3 -5 days between bowel movements?  Yes (Extended Prep)    BMI > 40?  No    Preferred Pharmacy:    [facility]/pharmacy #[id number] - [place], [place] - [address]  [address] [place] [address]  Phone: [phone] Fax: [phone]      Final Scheduling Details     Procedure scheduled  Colonoscopy / Upper endoscopy (EGD)    Surgeon:  TOY     Date of procedure:  TBD     Pre-OP / PAC:   TBD    Location  TBD TBD    Sedation   TBD TBD      Patient Reminders:   You will receive a call from a Nurse to review instructions and health history.  This assessment must be completed prior to your procedure.  Failure to complete the Nurse assessment may result in the procedure being cancelled.      On the day of your procedure, please designate an adult(s) who can drive you home stay with you for the next 24 hours. The medicines used in the exam will make you sleepy. You will not be able to drive.      You cannot take public transportation, ride share services, or non-medical taxi service without a responsible caregiver.  Medical transport services are allowed with the requirement that a responsible caregiver will receive you at your destination.  We require that drivers and caregivers are confirmed prior to your procedure.

## 2024-03-25 NOTE — TELEPHONE ENCOUNTER
Caller: Writer to patient    Reason for Reschedule/Cancellation   (please be detailed, any staff messages or encounters to note?): Having amputation in April and wants to delay EGD, but also add colonoscopy when rescheduled.       Prior to reschedule please review:  Ordering Provider: Leventhal  Sedation Determined: MAC  Does patient have any ASC Exclusions, please identify?: Y - cirrhosis      Notes on Cancelled Procedure:  Procedure: Upper Endoscopy [EGD]   Date: 5/2/2024  Location: [facility]; [address] [place] [address], [address]rd Floor, [place] [id number]   Surgeon: Carlos      Rescheduled: No, LVM for patient to call back to reschedule.        Did you cancel or rescheduled an EUS procedure? No.     CASE IN DEPOT. Will need to add colonoscopy procedure code when rescheduling.

## 2024-03-25 NOTE — TELEPHONE ENCOUNTER
----- Message from Miriam Light RN sent at 3/19/2024  1:19 PM CDT -----  Regarding: RE: EGD/Colonoscopy  Oh, perfect!     He will be fine with September, he's having his leg amputated below the knee so he wants to push it out a ways. I would appreciate it if someone can call him when you guys all have a chance. Thank you so much!    Lia   ----- Message -----  From: Darren Hood  Sent: 3/19/2024   1:13 PM CDT  To: Miriam Light, RN; #  Subject: RE: EGD/Colonoscopy                              Good afternoon,     The order placed by Dr. Leventhal was for an upper and lower endoscopy, so I don't think the new order is needed. We are currently booking into September at [facility], but we can give him a call to reschedule.     Thanks,   Cooper HUERTAS   Endoscopy Scheduling Team  ----- Message -----  From: Miriam Light RN  Sent: 3/19/2024   1:01 PM CDT  To: Endoscopy Scheduling Pool  Subject: EGD/Colonoscopy                                  Hello,     I placed a colonoscopy referral for this patient and he reached out to me stating that he is having knee surgery next month and would like his EGD in May rescheduled, and would like to do the EGD at the same time as his colonoscopy.     I told him I would message scheduling to see if someone could call him to cancel and reschedule this. He said he is wanting end of July or later. Is this something he can do?      Lia Light RN, BSN  Colorectal Cancer   Division of Gastroenterology at [facility]

## 2024-03-27 LAB
ABO/RH(D): NORMAL
ANTIBODY SCREEN: NEGATIVE
SPECIMEN EXPIRATION DATE: NORMAL

## 2024-03-27 NOTE — TELEPHONE ENCOUNTER
Final Scheduling Details     Procedure scheduled  Colonoscopy / Upper endoscopy (EGD)    Surgeon:  Carlos     Date of procedure:  09/26/2024     Pre-OP / PAC:   No - Not required for this site.    Location  [facility] - Per exclusion criteria.    Sedation   MAC/Deep Sedation - Per order.      Patient Reminders:   You will receive a call from a Nurse to review instructions and health history.  This assessment must be completed prior to your procedure.  Failure to complete the Nurse assessment may result in the procedure being cancelled.      On the day of your procedure, please designate an adult(s) who can drive you home stay with you for the next 24 hours. The medicines used in the exam will make you sleepy. You will not be able to drive.      You cannot take public transportation, ride share services, or non-medical taxi service without a responsible caregiver.  Medical transport services are allowed with the requirement that a responsible caregiver will receive you at your destination.  We require that drivers and caregivers are confirmed prior to your procedure.

## 2024-03-28 ENCOUNTER — OFFICE VISIT (OUTPATIENT)
Dept: SURGERY | Facility: CLINIC | Age: 64
End: 2024-03-28
Payer: MEDICARE

## 2024-03-28 ENCOUNTER — LAB (OUTPATIENT)
Dept: LAB | Facility: CLINIC | Age: 64
End: 2024-03-28
Payer: MEDICARE

## 2024-03-28 ENCOUNTER — PRE VISIT (OUTPATIENT)
Dept: SURGERY | Facility: CLINIC | Age: 64
End: 2024-03-28

## 2024-03-28 ENCOUNTER — ANCILLARY PROCEDURE (OUTPATIENT)
Dept: ULTRASOUND IMAGING | Facility: CLINIC | Age: 64
End: 2024-03-28
Attending: ORTHOPAEDIC SURGERY
Payer: MEDICARE

## 2024-03-28 VITALS
SYSTOLIC BLOOD PRESSURE: 136 MMHG | DIASTOLIC BLOOD PRESSURE: 80 MMHG | BODY MASS INDEX: 29.03 KG/M2 | WEIGHT: 202.8 LBS | TEMPERATURE: 98.8 F | RESPIRATION RATE: 16 BRPM | OXYGEN SATURATION: 98 % | HEART RATE: 54 BPM | HEIGHT: 70 IN

## 2024-03-28 DIAGNOSIS — S91.309A NONHEALING WOUND OF HEEL: ICD-10-CM

## 2024-03-28 DIAGNOSIS — M65.10 ACHILLES TENDON INFECTION: ICD-10-CM

## 2024-03-28 DIAGNOSIS — Z01.818 PRE-OP EVALUATION: ICD-10-CM

## 2024-03-28 DIAGNOSIS — Z01.818 PRE-OP EVALUATION: Primary | ICD-10-CM

## 2024-03-28 DIAGNOSIS — M79.89 OTHER SPECIFIED SOFT TISSUE DISORDERS: ICD-10-CM

## 2024-03-28 PROCEDURE — 86900 BLOOD TYPING SEROLOGIC ABO: CPT | Performed by: PHYSICIAN ASSISTANT

## 2024-03-28 PROCEDURE — 93926 LOWER EXTREMITY STUDY: CPT | Mod: RT | Performed by: RADIOLOGY

## 2024-03-28 PROCEDURE — 36415 COLL VENOUS BLD VENIPUNCTURE: CPT | Performed by: PATHOLOGY

## 2024-03-28 PROCEDURE — 99215 OFFICE O/P EST HI 40 MIN: CPT | Performed by: PHYSICIAN ASSISTANT

## 2024-03-28 PROCEDURE — 93923 UPR/LXTR ART STDY 3+ LVLS: CPT | Performed by: RADIOLOGY

## 2024-03-28 ASSESSMENT — ENCOUNTER SYMPTOMS
ORTHOPNEA: 0
DYSRHYTHMIAS: 1
SEIZURES: 0

## 2024-03-28 ASSESSMENT — PAIN SCALES - GENERAL: PAINLEVEL: WORST PAIN (10)

## 2024-03-28 ASSESSMENT — LIFESTYLE VARIABLES: TOBACCO_USE: 1

## 2024-03-28 ASSESSMENT — COPD QUESTIONNAIRES: COPD: 0

## 2024-03-28 NOTE — H&P
Pre-Operative H & P     CC:  Preoperative exam to assess for increased cardiopulmonary risk while undergoing surgery and anesthesia.    Date of Encounter: 3/28/2024  Primary Care Physician:  Wegener, Joel Daniel Irwin     Reason for visit:   Encounter Diagnoses   Name Primary?    Pre-op evaluation Yes    Achilles tendon infection        HPI  Erwin Aguilar is a 63 year old male who presents for pre-operative H & P in preparation for  Procedure Information       Case: [id number] Date/Time: 04/18/24 1000    Procedure: Right below knee amputation (transtibial amputation) (Right: Leg)    Anesthesia type: Epidural with Block    Diagnosis: Achilles tendon infection [M65.10]    Pre-op diagnosis: Achilles tendon infection [M65.10]    [facility]    Providers: Kurtis Nye MD            Patient is being evaluated for comorbid conditions of hypertension, hyperlipidemia, paroxysmal atrial fibrillation, asthma, chronic sinusitis, osteoarthritis, type 2 diabetes, GERD, cirrhosis, gastroparesis, anxiety, depression, history of alcohol abuse (in remission x 10 years), history of cauda equina syndrome, and chronic pain.    He has a chronic heel ulcer on the posterior right heel near his Achilles insertion.  He is status post reconstruction surgery around 2020.  However soon after he developed infection with a chronic open wound and has had multiple episodes of dehiscence followed by healing more times than he can recall.  He currently has a scab in this area and reports severe localized pain.  Was recently evaluated by Dr. Nye on 2/16/2024 to discuss surgical interventions.  He is now scheduled for surgery as above.    History is obtained from the patient and chart review    Hx of abnormal bleeding, anticoagulation, or anti-platelet use: Eliquis      Past Medical History  Past Medical History:   Diagnosis Date    Actinic keratosis     aldara    Anxiety     Cancer (H)     squamous cell skin CA    Cauda equina  spinal cord injury (H)     Chronic sinusitis 5-1-16    Depressive disorder     Diastasis recti     Esophageal reflux     Esophageal varices in cirrhosis (H) 4/1/2014    Hospitalized for UGI blee 3/28/14, endoscopy revealed bleeding varices.    Essential hypertension, benign     Intermittent asthma     Mild depression     Mixed hyperlipidemia     Nasal polyps 5-1-16    Osteoarthritis of lumbar spine, unspecified spinal osteoarthritis complication status     Other chronic pain     Paroxysmal atrial fibrillation (H) 9/22/2021    SCCA (squamous cell carcinoma) of skin     Seasonal allergic conjunctivitis     Type II or unspecified type diabetes mellitus without mention of complication, not stated as uncontrolled     Unspecified site of spinal cord injury without evidence of spinal bone injury     due to back surgery       Past Surgical History  Past Surgical History:   Procedure Laterality Date    ABDOMEN SURGERY  2014    BACK SURGERY  August 2009    COLONOSCOPY N/A 5/12/2016    Procedure: COMBINED COLONOSCOPY, SINGLE OR MULTIPLE BIOPSY/POLYPECTOMY BY BIOPSY;  Surgeon: Ana Paula Urbina MD;  Location:  GI    DECOMPRESSION CUBITAL TUNNEL Left 8/31/2023    Procedure: LEFT CUBITAL TUNNEL RELEASE,;  Surgeon: Deny Dixon MD;  [place] [facility] [place]    ENDOSCOPY UPPER, COLONOSCOPY, COMBINED  10/19/2011    Procedure:COMBINED ENDOSCOPY UPPER, COLONOSCOPY; Upper Endoscopy, Colonoscopy with Polypectomy x4; Surgeon:AMBAR RODRÍGUEZIQ; Location: OR    ENT SURGERY  1-2016    Ongoing since then    ESOPHAGOSCOPY, GASTROSCOPY, DUODENOSCOPY (EGD), COMBINED  3/28/2014    Procedure: COMBINED ESOPHAGOSCOPY, GASTROSCOPY, DUODENOSCOPY (EGD);  EGD, Gastric Biopsies, Esophageal Banding;  Surgeon: Reyna Tovar MD;  Location:  OR    ESOPHAGOSCOPY, GASTROSCOPY, DUODENOSCOPY (EGD), COMBINED  6/9/2014    Procedure: COMBINED ESOPHAGOSCOPY, GASTROSCOPY, DUODENOSCOPY (EGD);  Surgeon: Curtis Mendez MD;  Location:  GI     ESOPHAGOSCOPY, GASTROSCOPY, DUODENOSCOPY (EGD), COMBINED  7/24/2014    Procedure: COMBINED ESOPHAGOSCOPY, GASTROSCOPY, DUODENOSCOPY (EGD);  Surgeon: Gerard Samaniego MD;  [facility]    ESOPHAGOSCOPY, GASTROSCOPY, DUODENOSCOPY (EGD), COMBINED N/A 10/31/2014    Procedure: COMBINED ESOPHAGOSCOPY, GASTROSCOPY, DUODENOSCOPY (EGD);  Surgeon: Gerard Samaniego MD;  [facility]    ESOPHAGOSCOPY, GASTROSCOPY, DUODENOSCOPY (EGD), COMBINED N/A 5/12/2016    Procedure: COMBINED ESOPHAGOSCOPY, GASTROSCOPY, DUODENOSCOPY (EGD);  Surgeon: Ana Paula Urbina MD;  [facility]    ESOPHAGOSCOPY, GASTROSCOPY, DUODENOSCOPY (EGD), COMBINED N/A 8/2/2018    Procedure: COMBINED ESOPHAGOSCOPY, GASTROSCOPY, DUODENOSCOPY (EGD);  EGD;  Surgeon: Yu Wagner MD;  Location: [facility]    HCL SQUAMOUS CELL CARCINOMA AG  10/07    left forearm    HERNIORRHAPHY UMBILICAL  11/8/2012    Procedure: HERNIORRHAPHY UMBILICAL;  Open Umbilical Hernia Repair With Mesh ;  Surgeon: Cahse Nicholson MD;  [facility]    INSERT STIMULATOR DORSAL COLUMN N/A 6/28/2018    Procedure: INSERT STIMULATOR DORSAL COLUMN;;  Surgeon: Elvis Sauceda MD;  [facility]    neuro stimulator  2010    RELEASE CARPAL TUNNEL Left 8/31/2023    Procedure: LEFT OPEN CARPAL TUNNEL RELEASE,;  Surgeon: Deny Dixon MD;  Location: [facility] OR    RELEASE TRIGGER FINGER Left 8/31/2023    Procedure: Release left trigger finger thumb;  Surgeon: Deny Dixon MD;  Location: [facility] OR    REMOVE GENERATOR STIMULATOR (LOCATION) N/A 6/28/2018    Procedure: REMOVE GENERATOR STIMULATOR (LOCATION);  Spinal Cord Stimulator and IPG Explant and Re-Implant of SCS System (Leads and IPG);  Surgeon: Elvis Sauceda MD;  [facility]    REPAIR TENDON ACHILLES Right 11/11/2020    Procedure: Right achilles debridement and partial calcaneus excision;  Surgeon: Eh Sandoval MD;  Location: [facility] OR    SURGICAL HISTORY OF  -   1/02    abcess tooth    SURGICAL HISTORY OF -   1999    L4-5 laminectomy, cauda equina syndrome    SURGICAL HISTORY OF -   +    herniated disk repair    TONSILLECTOMY  10 1964    TRANSPOSITION ULNAR NERVE (ELBOW)      right    ZZC APPENDECTOMY  1974       Prior to Admission Medications  Current Outpatient Medications   Medication Sig Dispense Refill    albuterol (PROAIR HFA/PROVENTIL HFA/VENTOLIN HFA) 108 (90 Base) MCG/ACT inhaler INHALE 2 PUFFS BY MOUTH EVERY 6 HOURS AS NEEDED FOR SHORTNESS OF BREATH OR WHEEZING 18 g 1    apixaban ANTICOAGULANT (ELIQUIS ANTICOAGULANT) 5 MG tablet Take 1 tablet (5 mg) by mouth 2 times daily 180 tablet 3    baclofen (LIORESAL) 10 MG tablet TAKE 2 TABLETS BY MOUTH 3 TIMES A  tablet 3    doxazosin (CARDURA) 8 MG tablet Take 1 tablet (8 mg) by mouth at bedtime 90 tablet 1    gabapentin (NEURONTIN) 300 MG capsule TAKE 1 CAPSULE BY MOUTH IN THE AFTERNOON AND 2 CAPSULES AT BEDTIME 90 capsule 1    insulin glargine (LANTUS SOLOSTAR) 100 UNIT/ML pen INJECT 26 UNITS SUBCUTANEOUSLY AT BEDTIME (Patient taking differently: Inject 26 Units Subcutaneous at bedtime INJECT 26 UNITS SUBCUTANEOUSLY AT BEDTIME) 15 mL 3    medical cannabis (Patient's own supply) See Admin Instructions (The purpose of this order is to document that the patient reports taking medical cannabis.  This is not a prescription, and is not used to certify that the patient has a qualifying medical condition.)   Flower - PRN      oxyCODONE (ROXICODONE) 5 MG tablet Take 1 tablet (5 mg) by mouth 4 times daily as needed for severe pain #120/month, visit with Dr. Wegener every 3 months. 120 tablet 0    oxyCODONE IR (ROXICODONE) 10 MG tablet Take 1 tablet (10 mg) by mouth daily as needed for severe pain In addition to 5 mg four times daily dosing. 30 tablet 0    promethazine (PHENERGAN) 25 MG tablet TAKE 1 TABLET BY MOUTH 3 TIMES A DAY AS NEEDED FOR NAUSEA 270 tablet 1    propranolol (INDERAL) 40 MG tablet Take 1 tablet (40  mg) by mouth 3 times daily 270 tablet 3    rosuvastatin (CRESTOR) 20 MG tablet Take 1 tablet (20 mg) by mouth daily (Patient taking differently: Take 20 mg by mouth at bedtime) 90 tablet 3    sertraline (ZOLOFT) 100 MG tablet TAKE 2 TABLETS BY MOUTH DAILY (Patient taking differently: Take 200 mg by mouth every morning TAKE 2 TABLETS BY MOUTH DAILY) 180 tablet 3    triamterene-HCTZ (DYAZIDE) 37.5-25 MG capsule Take 1 capsule by mouth every morning 90 capsule 3    ACE/ARB NOT PRESCRIBED, INTENTIONAL, ACE & ARB not prescribed due to Allergy      Continuous Blood Gluc  (FREESTYLE CACHORRO 2 READER) GASTON USE TO READ BLOOD SUGARS AS PER 'S INSTRUCTIONS (Patient not taking: Reported on 3/8/2024) 1 each 0    insulin pen needle (GLOBAL EASE INJECT PEN NEEDLES) 32G X 4 MM miscellaneous USE AS DIRECTED EVERY  each 3       Allergies  Allergies   Allergen Reactions    Levaquin Anaphylaxis and Rash     Swelling in lip and tongue felt thick    Lisinopril Anaphylaxis     Swollen tongue; inability to swallow; drooling; hives; swollen face, neck, angioedema    Acetaminophen      Hx of cirrhosis     Amlodipine      Swelling, hives, possible angioedema      Morphine      b/p dropped and arms went numb  Hypotension    Quinolones Hives    Spironolactone Unknown     Pt believes himself to be allergic to it; cannot find his old records of this.-mjc     Bactrim [Sulfamethoxazole-Trimethoprim] Rash       Social History  Social History     Socioeconomic History    Marital status: Single     Spouse name: Not on file    Number of children: 0    Years of education: Not on file    Highest education level: Not on file   Occupational History    Occupation: sales     Employer: UNEMPLOYED   Tobacco Use    Smoking status: Former     Packs/day: 0.00     Years: 1.00     Additional pack years: 0.00     Total pack years: 0.00     Types: Cigarettes     Start date: 10/13/1983     Quit date: 1984     Years since quittin.8      Passive exposure: Past    Smokeless tobacco: Never   Vaping Use    Vaping Use: Every day    Substances: THC, CBD    Devices: Refillable tank   Substance and Sexual Activity    Alcohol use: Not Currently     Comment: a pint of alcohol / day - last drink was 3/28/14    Drug use: Yes     Frequency: 2.0 times per week     Types: Marijuana     Comment: medical marijuana - vape daily    Sexual activity: Not Currently     Partners: Female     Birth control/protection: Abstinence   Other Topics Concern    Parent/sibling w/ CABG, MI or angioplasty before 65F 55M? No   Social History Narrative    Not on file     Social Determinants of Health     Financial Resource Strain: Low Risk  (1/9/2024)    Financial Resource Strain     Within the past 12 months, have you or your family members you live with been unable to get utilities (heat, electricity) when it was really needed?: No   Food Insecurity: Low Risk  (1/9/2024)    Food Insecurity     Within the past 12 months, did you worry that your food would run out before you got money to buy more?: No     Within the past 12 months, did the food you bought just not last and you didn t have money to get more?: No   Transportation Needs: High Risk (1/9/2024)    Transportation Needs     Within the past 12 months, has lack of transportation kept you from medical appointments, getting your medicines, non-medical meetings or appointments, work, or from getting things that you need?: Yes   Physical Activity: Inactive (12/31/2020)    Exercise Vital Sign     Days of Exercise per Week: 0 days     Minutes of Exercise per Session: 0 min   Stress: Not on file   Social Connections: Unknown (12/31/2020)    Social Connection and Isolation Panel [NHANES]     Frequency of Communication with Friends and Family: Not on file     Frequency of Social Gatherings with Friends and Family: Not on file     Attends Faith Services: Not on file     Active Member of Clubs or Organizations: Not on file     Attends  Club or Organization Meetings: Not on file     Marital Status:    Interpersonal Safety: Low Risk  (1/9/2024)    Interpersonal Safety     Do you feel physically and emotionally safe where you currently live?: Yes     Within the past 12 months, have you been hit, slapped, kicked or otherwise physically hurt by someone?: No     Within the past 12 months, have you been humiliated or emotionally abused in other ways by your partner or ex-partner?: No   Housing Stability: Low Risk  (1/9/2024)    Housing Stability     Do you have housing? : Yes     Are you worried about losing your housing?: No       Family History  Family History   Problem Relation Age of Onset    Cancer Mother         lung    Breast Cancer Mother     Other Cancer Mother     Cancer Father         esophogeal, GERD    Snoring Father     Diabetes Brother         amputation, Type 1    Diabetes Brother     Diabetes Brother     Cancer - colorectal No family hx of     Glaucoma No family hx of     Macular Degeneration No family hx of     Anesthesia Reaction No family hx of     Venous thrombosis No family hx of        Review of Systems  The complete review of systems is negative other than noted in the HPI or here.   Anesthesia Evaluation   Pt has had prior anesthetic.     No history of anesthetic complications       ROS/MED HX  ENT/Pulmonary: Comment: Chronic sinusitis    (+)     ALISIA risk factors,  hypertension,         tobacco use, Past use,    Intermittent, asthma  Treatment: Inhaler prn,              (-) COPD and recent URI   Neurologic: Comment: History of cauda equina    (+)                     Spinal cord injury (surgical injury), year sustained: 2009,       (-) no seizures and no CVA   Cardiovascular:     (+) Dyslipidemia hypertension- -   -  - -   Taking blood thinners                     dysrhythmias, a-fib,        Previous cardiac testing   Echo: Date: 9/7/2021 Results:  Interpretation Summary   Global and regional left ventricular function is  normal with an EF of 55-60%.   Right ventricular function, chamber size, wall motion, and thickness are normal.   No hemodynamcially signifcant valvular abnormalities.   Estimated mean RA pressure is mildly elevated at 8 mmHg.   No pericardial effusion is present.   There is no prior study for direct comparison.  Stress Test:  Date: Results:    ECG Reviewed:  Date: 10/25/2021 Results:  Holter  Conclusion: A tendency toward sinus bradycardia was noted throughout the monitoring time with average resting heart rate approximately 45 bpm and average heart rate less than 65 bpm which could indicate some chronotropic incompetence in an active patient.  These findings could be consistent with sinus node dysfunction.  No atrial fibrillation was noted.   Cath:  Date: Results:   (-) CAD, CHAVEZ, orthopnea/PND and irregular heartbeat/palpitations   METS/Exercise Tolerance: 3 - Able to walk 1-2 blocks without stopping Comment: Limited due to chronic pain, occasionally walks with a cane. Denies cardiac symptoms such as chest pain/pressure, dizziness, CHAVEZ, orthopnea.    Riding electric bike, but only uses left foot to pedal - rides 18 miles per day.    Hematologic:     (+)      anemia, history of blood transfusion, no previous transfusion reaction, Known PRBC Anitbodies:No    (-) history of blood clots   Musculoskeletal: Comment: History of drop foot, right  Ruptured achilles tendon, right with chronic infection  (+)  arthritis,             GI/Hepatic: Comment: Gastroparesis  Chronic nausea on phenergan    (+)   esophageal disease, Varices,         liver disease (Cirrhosis),    (-) GERD   Renal/Genitourinary:  - neg Renal ROS     Endo:     (+)  type II DM, Last HgA1c: 5.9, date: 1/9/24, Using insulin, - not using insulin pump.  not previously admitted for DM/DKA.           (-) thyroid disease and chronic steroid usage   Psychiatric/Substance Use:     (+) psychiatric history anxiety and depression alcohol abuse (sober since 2014)   "Recreational drug usage: Cannabis.    Infectious Disease:  - neg infectious disease ROS     Malignancy:   (+) Malignancy, History of Skin.Skin CA Remission status post Surgery.      Other:  - neg other ROS    (+)  , H/O Chronic Pain,         /80 (BP Location: Right arm, Patient Position: Sitting, Cuff Size: Adult Regular)   Pulse 54   Temp 98.8  F (37.1  C) (Oral)   Resp 16   Ht 1.778 m (5' 10\")   Wt 92 kg (202 lb 12.8 oz)   SpO2 98%   BMI 29.10 kg/m      Physical Exam   Constitutional: Pleasant, well-appearing male, no apparent distress, and appears stated age.  Eyes: Pupils equal, round and reactive to light, extra ocular muscles intact, sclera clear, conjunctiva normal.  HENT: Normocephalic and atraumatic, oral pharynx with moist mucus membranes, upper and lower dentures. No goiter appreciated.   Respiratory: Clear to auscultation bilaterally, no crackles or wheezing.  Cardiovascular: Regular rate and rhythm, normal S1 and S2, and no murmur noted. Palpable pulses to radial arteries.   GI: Nondistended abdomen  Lymph/Hematologic: No cervical lymphadenopathy and no supraclavicular lymphadenopathy.  Genitourinary:  Deferred  Skin: Warm and dry.  No rashes on exposed skin   Musculoskeletal: Full ROM of neck. There is no redness, warmth, or swelling of the visible joints.     Neurologic: Awake, alert, oriented to name, place and time. Cranial nerves II-XII are grossly intact.   Neuropsychiatric: Calm, cooperative. Normal affect.       Prior Labs/Diagnostic Studies   All labs and imaging personally reviewed     EKG/ stress test - if available please see in ROS above   Echo result w/o MOPS: Interpretation SummaryGlobal and regional left ventricular function is normal with an EF of 55-60%.Right ventricular function, chamber size, wall motion, and thickness arenormal.No hemodynamcially signifcant valvular abnormalities.Estimated mean RA pressure is mildly elevated at 8 mmHg.No pericardial effusion is present. " There is no prior study for direct comparison.    The patient's records and results personally reviewed by this provider.     Outside records reviewed from: Care Everywhere    LAB/DIAGNOSTIC STUDIES TODAY:  T&S    Assessment  Erwin Aguilar is a 63 year old male seen as a PAC referral for risk assessment and optimization for anesthesia.    Plan/Recommendations  Pt will be optimized for the proposed procedure.  See below for details on the assessment, risk, and preoperative recommendations    NEUROLOGY  - No history of TIA, CVA or seizure  - - History of intra-operative spinal cord injury 2009 complicated by cauda equina syndrome. [employer] spinal cord stimulator in place.  Patient is not currently using spinal cord stimulator.  - Chronic Pain  On chronic opiates, morphine equivilant = 45 MME/Day   -Post Op delirium risk factors:  High co-morbid index    ENT  - No current airway concerns.  Will need to be reassessed day of surgery.  Mallampati: III  TM: > 3  Upper and lower dentures    CARDIAC  - No history of CAD  - Paroxysmal Afib  Anticoagulated on Eliquis  [id number] score: 2   - Hypertension  Patient has a history of allergy to multiple antihypertensives and has had difficulty controlling his BP in the past. Today his BP is well controlled and he attributes this to decreased stress and better pain management.   - Hyperlipidemia  Well controlled on home regimen  - Patient reports a history of bradycardia, HR drops into the 40s while he is asleep. He would like his anesthesia team to be aware of this.        - METS (Metabolic Equivalents)  Patient CANNOT perform 4 METS exercise due to chronic pain. Denies cardiac symptoms.           Total Score: 1    Functional Capacity: Unable to complete 4 METS      RCRI-Low risk: Class 2 0.9% complication rate            Total Score: 1    RCRI: Diabetes        PULMONARY    ALISIA Medium Risk            Total Score: 3    ALISIA: Hypertension    ALISIA: Over 50 ys old    ALISIA: Male     "  - Asthma  Well controlled  - Tobacco History    History   Smoking Status    Former    Packs/day: 0.00    Years: 1.00    Types: Cigarettes    Start date: 10/13/1983    Quit date: 6/9/1984   Smokeless Tobacco    Never       GI  - GERD  History of GERD, resolved when patient stopped drinking alcohol almost 10 years ago.   - History of alcohol abuse/cirrhosis, sober since 2014. LFTs completed this morning within normal limits. Follows with GI annually.   - History of esophageal varices with GI bleed. No recurrence. Last EGD 2018 with normal esophagus. Planning for repeat EGD in the near future.  - Chronic nausea on phenergan    PONV Medium Risk  Total Score: 2           1 AN PONV: Patient is not a current smoker    1 AN PONV: Intended Post Op Opioids        /RENAL  - Baseline Creatinine  0.84    ENDOCRINE    - BMI: Estimated body mass index is 29.1 kg/m  as calculated from the following:    Height as of this encounter: 1.778 m (5' 10\").    Weight as of this encounter: 92 kg (202 lb 12.8 oz).  Overweight (BMI 25.0-29.9)  - Diabetes  Hemoglobin A1C (%)   Date Value   01/09/2024 5.9 (H)   04/09/2021 6.0 (H)     Diabetes Type 2, insulin dependent. Take 80% of last scheduled dose of long-acting insulin prior to procedure.  Recommend close monitoring of the patient's blood glucose levels throughout the perioperative period.    HEME  VTE Low Risk 0.5%            Total Score: 3    VTE: Greater than 59 yrs old    VTE: Male      - Coagulopathy secondary to Apixaban (Eliquis). Hold 3 days prior to neuraxial/block anesthesia per PAC guidelines.   - History of anemia  Most recent CBC 1/5/24 WNL  Recommend perioperative use of blood conservation techniques intraoperatively and close monitoring for postoperative bleeding.  A type and screen has been ordered for this patient  - History of blood transfusion. No known transfusion reaction or antibodies.     MSK  - Chronic infection of achilles tendon  Above surgery planned for " further management  Patient is not currently bearing much weight on right leg. Consider fall risk precautions.   - History of multiple orthopedic surgeries  - History of drop foot and achilles tendon rupture, right  - Recommend consideration for careful positioning to limit patient discomfort.    PSYCH  - Anxiety, Depression  Continue current medication regimen    Different anesthesia methods/types have been discussed with the patient, but they are aware that the final plan will be decided by the assigned anesthesia provider on the date of service.  Patient was discussed with Dr. Ojeda    The patient is optimized for their procedure. AVS with information on surgery time/arrival time, meds and NPO status given by nursing staff. No further diagnostic testing indicated.      On the day of service:     Prep time: 10 minutes  Visit time: 15 minutes  Documentation time: 17 minutes  ------------------------------------------  Total time: 42 minutes      Miya Cummings PA-C  Preoperative Assessment Center  [facility]  Phone: [phone]  Fax: [phone]

## 2024-03-28 NOTE — PATIENT INSTRUCTIONS
Preparing for Your Surgery      Name:  Erwin Aguilar   MRN:  [id number]   :  1960   Today's Date:  3/28/2024       Arriving for surgery:  Surgery date:  24  Arrival time:  7:30 am  Surgery time: 10:00 am    Please come to:     Please come to:      [facility] [address]  [address] [place]  [address]  The Green Ramp for patients and visitors is located beneath the [facility]. The parking facility entrance is at the intersection of [address] [place] [address] and [address]. Patients and visitors who self-park will receive the reduced hospital parking rate (no ticket validation needed).  [employer] parking, located at the [facility] main entrance on [address] [place] [address], is available Monday - Friday from 7 am to 3:30 pm.  Discounted parking pass options can be purchased from  attendants during business hours.  -Check in at the security desk in the [facility] ([facility]) Lobby. They will direct you to the correct elevators.  -Proceed to the 3rd floor, check in at the Adult Surgery Waiting Lounge. [phone]  If you are in need of directions, a wheelchair or escort please stop at the Information Desk in the lobby.  Inform the information person that you are here for surgery; a wheelchair and escort to [address] will be provided.   An escort to the Adult Surgery Waiting Lounge will be provided.    What can I eat or drink?  -  You may eat and drink normally up to 8 hours prior to arrival time. (Until 11:30 pm on 24)  -  You may have clear liquids until 2 hours prior to arrival time. (Until 5:30 am on 24)    Examples of clear liquids:  Water  Clear broth  Juices (apple, white grape, white cranberry  and cider) without pulp  Noncarbonated, powder based beverages  (lemonade and Ronnell-Aid)  Sodas (Sprite, 7-Up, ginger ale and seltzer)  Coffee or tea (without milk or  cream)  Gatorade    -  No Alcohol or cannabis products for at least 24 hours before surgery.     Which medicines can I take?    Hold Ibuprofen (Advil, Motrin) for 3 day(s) before surgery--unless otherwise directed by surgeon.  Hold Naproxen (Aleve) for 4 days before surgery.    Hold Apixaban (Eliquis) for 3 days before surgery. Take last dose 4/14/24.    Take 21 units of Glargine (Lantus) insulin the evening before surgery instead of 26 units.     Please Hold-      Triamterene-hydrochlorothiazide (Dyazide)    -  PLEASE TAKE these medications the day of surgery:   Albuterol inhaler as needed   Baclofen (Lioresal)   Oxycodone as needed   Promethazine (Phenergan) as needed   Propranolol (Inderal)    Sertraline (zoloft)         How do I prepare myself?  - Please take 2 showers (one the night prior to surgery and one the morning of surgery) using Scrubcare or Hibiclens soap.    Use this soap only from the neck to your toes.     Leave the soap on your skin for one minute--then rinse thoroughly.      You may use your own shampoo and conditioner. No other hair products.   - Please remove all jewelry and body piercings.  - No lotions, deodorants or fragrance.  - Bring your ID and insurance card.    -If you use a CPAP machine, please bring the CPAP machine, tubing, and mask to hospital.    -If you have a Deep Brain Stimulator, Spinal Cord Stimulator, or any Neuro Stimulator device---you must bring the remote control to the hospital.      ALL PATIENTS GOING HOME THE SAME DAY OF SURGERY ARE REQUIRED TO HAVE A RESPONSIBLE ADULT TO DRIVE AND BE IN ATTENDANCE WITH THEM FOR 24 HOURS FOLLOWING SURGERY.    Covid testing policy as of 12/06/2022  Your surgeon will notify and schedule you for a COVID test if one is needed before surgery--please direct any questions or COVID symptoms to your surgeon      Questions or Concerns:    - For any questions regarding the day of surgery or your hospital stay, please contact the Pre Admission  Nursing Office at [phone].       - If you have health changes between today and your surgery, please call your surgeon.       - For questions after surgery, please call your surgeons office.           Current Visitor Guidelines    You may have 2 visitors in the pre op area.    Visiting hours: 8 a.m. to 8:30 p.m.    Patients confirmed or suspected to have symptoms of COVID 19 or flu:     No visitors allowed for adult patients.   Children (under age 18) can have 1 named visitor.     People who are sick or showing symptoms of COVID 19 or flu:    Are not allowed to visit patients--we can only make exceptions in special situations.       Please follow these guidelines for your visit:          Please maintain social distance          Masking is optional--however at times you may be asked to wear a mask for the safety of yourself and others     Clean your hands with alcohol hand . Do this when you arrive at and leave the building and patient room,    And again after you touch your mask or anything in the room.     Go directly to and from the room you are visiting.     Stay in the patient s room during your visit. Limit going to other places in the hospital as much as possible     Leave bags and jackets at home or in the car.     For everyone s health, please don t come and go during your visit. That includes for smoking   during your visit.

## 2024-03-29 DIAGNOSIS — M54.17 LUMBOSACRAL RADICULOPATHY AT S1: ICD-10-CM

## 2024-03-29 RX ORDER — GABAPENTIN 300 MG/1
CAPSULE ORAL
Qty: 90 CAPSULE | Refills: 1 | Status: ON HOLD | OUTPATIENT
Start: 2024-03-29 | End: 2024-04-24

## 2024-03-29 NOTE — TELEPHONE ENCOUNTER
Refill request        Medication: gabapentin (NEURONTIN) 300 MG capsule     Sig: TAKE 1 CAPSULE BY MOUTH IN THE AFTERNOON AND 2 CAPSULES AT BEDTIME     Dispensed: 90  Refills: 1    Last prescribed to patient: 2/5/24 (Non controlled substance)    Last clinic appointment: 2/15/24  Next clinic appointment: Not scheduled.       Preferred pharmacy:    [facility]/pharmacy #[id number] - [place], [place] - [address] [place] [address] [phone]     Refill request routed to the provider to review.     Kathleen Ryan LPN

## 2024-04-01 ENCOUNTER — TELEPHONE (OUTPATIENT)
Dept: VASCULAR SURGERY | Facility: CLINIC | Age: 64
End: 2024-04-01
Payer: MEDICARE

## 2024-04-01 NOTE — TELEPHONE ENCOUNTER
Referring providers name, location, phone number and fax: Kurtis Nye MD in [facility]    Reason for visit: Distal superficial femoral greater than 75% diameter stenosis suggested by 6.67 velocity ratio and distal post-stenotic waveform, Patient has upcoming surgery with Dr. Nye need to have consult    Does patient have a referral:Yes    Referral to specific provider? no    Medical records or notes if possible: Epic    Please call the pt back to schedule. Thanks

## 2024-04-02 NOTE — TELEPHONE ENCOUNTER
Called and spoke with Pt. Informed him per Dr Helm, we do not feel he needs to be seen. Message was sent to Dr Nye and [facility]. If there is a change in plan once we receive a response, Pt will be notified. Pt verbalized understanding, agreed to current plan and denied any further questions.    Maryan Sumner LPN

## 2024-04-02 NOTE — TELEPHONE ENCOUNTER
[facility] Call Center  Phone Message  May a detailed message be left on voicemail: yes   Reason for Call: Other: Pt called again regarding below. Please call him back to schedule. Thanks    Action Taken: Message routed to:  [facility] VAS  Travel Screening: Not Applicable

## 2024-04-07 ENCOUNTER — MYC MEDICAL ADVICE (OUTPATIENT)
Dept: FAMILY MEDICINE | Facility: CLINIC | Age: 64
End: 2024-04-07
Payer: MEDICARE

## 2024-04-07 DIAGNOSIS — S86.011A RUPTURE OF RIGHT ACHILLES TENDON, INITIAL ENCOUNTER: ICD-10-CM

## 2024-04-07 DIAGNOSIS — M79.671 INTRACTABLE RIGHT HEEL PAIN: ICD-10-CM

## 2024-04-07 DIAGNOSIS — Z98.890 H/O FOOT SURGERY: ICD-10-CM

## 2024-04-07 DIAGNOSIS — G89.4 CHRONIC PAIN SYNDROME: ICD-10-CM

## 2024-04-07 DIAGNOSIS — M25.571 CHRONIC PAIN OF RIGHT ANKLE: ICD-10-CM

## 2024-04-07 DIAGNOSIS — M65.28 CALCIFIC ACHILLES TENDINITIS: ICD-10-CM

## 2024-04-07 DIAGNOSIS — M21.6X1 ACQUIRED CALCANEUS DEFORMITY OF RIGHT ANKLE: ICD-10-CM

## 2024-04-07 DIAGNOSIS — G89.29 CHRONIC PAIN OF RIGHT ANKLE: ICD-10-CM

## 2024-04-08 RX ORDER — OXYCODONE HYDROCHLORIDE 5 MG/1
5 TABLET ORAL 4 TIMES DAILY PRN
Qty: 120 TABLET | Refills: 0 | Status: SHIPPED | OUTPATIENT
Start: 2024-04-08 | End: 2024-05-07

## 2024-04-16 RX ORDER — FENTANYL CITRATE 50 UG/ML
25-50 INJECTION, SOLUTION INTRAMUSCULAR; INTRAVENOUS
Status: CANCELLED | OUTPATIENT
Start: 2024-04-16

## 2024-04-16 RX ORDER — NALOXONE HYDROCHLORIDE 0.4 MG/ML
0.2 INJECTION, SOLUTION INTRAMUSCULAR; INTRAVENOUS; SUBCUTANEOUS
Status: CANCELLED | OUTPATIENT
Start: 2024-04-16

## 2024-04-16 RX ORDER — NALOXONE HYDROCHLORIDE 0.4 MG/ML
0.4 INJECTION, SOLUTION INTRAMUSCULAR; INTRAVENOUS; SUBCUTANEOUS
Status: CANCELLED | OUTPATIENT
Start: 2024-04-16

## 2024-04-16 RX ORDER — FLUMAZENIL 0.1 MG/ML
0.2 INJECTION, SOLUTION INTRAVENOUS
Status: CANCELLED | OUTPATIENT
Start: 2024-04-16

## 2024-04-18 ENCOUNTER — MYC MEDICAL ADVICE (OUTPATIENT)
Dept: FAMILY MEDICINE | Facility: CLINIC | Age: 64
End: 2024-04-18
Payer: MEDICARE

## 2024-04-18 ENCOUNTER — PREP FOR PROCEDURE (OUTPATIENT)
Dept: VASCULAR SURGERY | Facility: CLINIC | Age: 64
End: 2024-04-18
Payer: MEDICARE

## 2024-04-18 ENCOUNTER — HOSPITAL ENCOUNTER (OUTPATIENT)
Facility: CLINIC | Age: 64
Discharge: HOME | End: 2024-04-18
Attending: ORTHOPAEDIC SURGERY | Admitting: ORTHOPAEDIC SURGERY
Payer: MEDICARE

## 2024-04-18 VITALS
DIASTOLIC BLOOD PRESSURE: 82 MMHG | HEART RATE: 51 BPM | RESPIRATION RATE: 18 BRPM | WEIGHT: 202.16 LBS | OXYGEN SATURATION: 98 % | BODY MASS INDEX: 28.94 KG/M2 | TEMPERATURE: 98.3 F | HEIGHT: 70 IN | SYSTOLIC BLOOD PRESSURE: 175 MMHG

## 2024-04-18 DIAGNOSIS — I70.228 ATHEROSCLEROSIS OF NATIVE ARTERIES OF EXTREMITIES WITH REST PAIN, OTHER EXTREMITY (H): ICD-10-CM

## 2024-04-18 DIAGNOSIS — I73.9 PAD (PERIPHERAL ARTERY DISEASE) (H): Primary | ICD-10-CM

## 2024-04-18 DIAGNOSIS — I73.9 PVD (PERIPHERAL VASCULAR DISEASE) (H): Primary | ICD-10-CM

## 2024-04-18 LAB — GLUCOSE BLDC GLUCOMTR-MCNC: 124 MG/DL (ref 70–99)

## 2024-04-18 PROCEDURE — 999N000141 HC STATISTIC PRE-PROCEDURE NURSING ASSESSMENT: Performed by: ORTHOPAEDIC SURGERY

## 2024-04-18 PROCEDURE — 82962 GLUCOSE BLOOD TEST: CPT

## 2024-04-18 PROCEDURE — 999N000001 HC CANCELLED SURGERY UP TO 15 MINS: Performed by: ORTHOPAEDIC SURGERY

## 2024-04-18 RX ORDER — CEFAZOLIN SODIUM/WATER 2 G/20 ML
2 SYRINGE (ML) INTRAVENOUS SEE ADMIN INSTRUCTIONS
Status: DISCONTINUED | OUTPATIENT
Start: 2024-04-18 | End: 2024-04-18 | Stop reason: HOSPADM

## 2024-04-18 RX ORDER — CEFAZOLIN SODIUM/WATER 2 G/20 ML
2 SYRINGE (ML) INTRAVENOUS
Status: DISCONTINUED | OUTPATIENT
Start: 2024-04-18 | End: 2024-04-18 | Stop reason: HOSPADM

## 2024-04-18 RX ORDER — FENTANYL CITRATE 50 UG/ML
25-50 INJECTION, SOLUTION INTRAMUSCULAR; INTRAVENOUS
Status: DISCONTINUED | OUTPATIENT
Start: 2024-04-18 | End: 2024-04-18 | Stop reason: HOSPADM

## 2024-04-18 RX ORDER — TRANEXAMIC ACID 650 MG/1
1950 TABLET ORAL ONCE
Status: DISCONTINUED | OUTPATIENT
Start: 2024-04-18 | End: 2024-04-18 | Stop reason: HOSPADM

## 2024-04-18 RX ORDER — NALOXONE HYDROCHLORIDE 0.4 MG/ML
0.2 INJECTION, SOLUTION INTRAMUSCULAR; INTRAVENOUS; SUBCUTANEOUS
Status: DISCONTINUED | OUTPATIENT
Start: 2024-04-18 | End: 2024-04-18 | Stop reason: HOSPADM

## 2024-04-18 RX ORDER — FLUMAZENIL 0.1 MG/ML
0.2 INJECTION, SOLUTION INTRAVENOUS
Status: DISCONTINUED | OUTPATIENT
Start: 2024-04-18 | End: 2024-04-18 | Stop reason: HOSPADM

## 2024-04-18 RX ORDER — NALOXONE HYDROCHLORIDE 0.4 MG/ML
0.4 INJECTION, SOLUTION INTRAMUSCULAR; INTRAVENOUS; SUBCUTANEOUS
Status: DISCONTINUED | OUTPATIENT
Start: 2024-04-18 | End: 2024-04-18 | Stop reason: HOSPADM

## 2024-04-18 ASSESSMENT — ACTIVITIES OF DAILY LIVING (ADL)
ADLS_ACUITY_SCORE: 30
ADLS_ACUITY_SCORE: 28
ADLS_ACUITY_SCORE: 30
ADLS_ACUITY_SCORE: 30

## 2024-04-18 NOTE — OR NURSING
Time spent with pt, 35 mins - pt canceled today as he will follow up with vascular before proceeding with surgery. IV discharge

## 2024-04-18 NOTE — PROGRESS NOTES
"Orthopaedic surgery staff    I saw and examined this very pleasant 63-year-old diabetic patient and his significant other Isabella this morning in the preop area at [facility] ahead of right below knee amputation planned for today. He has a chronic ongoing scab over his right Achilles/heel area that will intermittently even now continue to open up with light pressure. We reviewed his diagnosis and preoperative tests including the vascular workup which among other things shows a 75% R SFA occlusion. It appears that the vascular surgery consultation we requested for preoperative optimization a few weeks ago was declined by the receiving service. I discussed the above with the patient, and I discussed that IF vascular surgery were able to optimize his circulation preoperatively, he may have the potential for a) improved healing of his BKA incision with decreased chances of wound dehiscence/necrosis/infection, b) that this may \"save him a level\" by allowing a BKA rather than an AKA, and b) that his chronic right Achilles/heel wound may even have a chance for healing. I also discussed and offered the option of an AKA at this time, which was mutually declined by patient and myself. I discussed the patient's case with the on call vascular team, who has graciously reviewed his case and the imaging studies, and will apparently be planning for an angiogram for the near future with possible intervention depending on findings. I discussed that once we determine if his inflow can be improved, and that any possible optimization has been performed, then we can proceed with a right BKA as originally planned, hopefully in the next few weeks. Plan discussed with patient who verbalized mutual agreement with temporarily standing down on right BKA for now and proceed with angiogram with possible subsequent intervention. All questions he and Isabella had at this time were answered.  "

## 2024-04-19 ENCOUNTER — DOCUMENTATION ONLY (OUTPATIENT)
Dept: VASCULAR SURGERY | Facility: CLINIC | Age: 64
End: 2024-04-19
Payer: MEDICARE

## 2024-04-19 ENCOUNTER — TELEPHONE (OUTPATIENT)
Dept: RADIOLOGY | Facility: CLINIC | Age: 64
End: 2024-04-19
Payer: MEDICARE

## 2024-04-19 DIAGNOSIS — I73.9 PAD (PERIPHERAL ARTERY DISEASE) (H): Primary | ICD-10-CM

## 2024-04-19 DIAGNOSIS — Z98.890 POSTOPERATIVE STATE: ICD-10-CM

## 2024-04-19 NOTE — PROGRESS NOTES
Discussed with team. Will plan to admit pt post-operatively dt lack of social support. Pt in agreement. We discussed his pre-op instructions including arrival time, NPO guidelines, and his Eliquis hold instructions.    Will send surgery packet via MyC.    FLORIN PhilippeN, RN  RN Care Coordinator  [facility] Vascular Surgery - [facility] phone: [phone]  Fax: [phone]

## 2024-04-19 NOTE — PROGRESS NOTES
Angiogram Scheduling    Procedure: Right  Leg  Angiogram     Procedure Date :  4/23/2024    Procedure Time :  140PM    Arrival Time: 11:30 AM    Admission Type: Outpatient    Surgeon:  Dr. Blue    Procedure Location: [facility]:  [address] [place] [address] (Fax: [phone])    Consent Completed:No, Scanned: No    Scheduled with: Jesse in IR scheduling and Ly in IR scheduling - Marissa approved IR    Anesthesia Needed: no IF Yes Please clarify that the CRNA, anesthesia and ANGELA/PACU resources are being added at scheduling    Blood Thinners Address: TBD    2 week Post Procedure Appointment with  WALLACE Todd and also ultrasound: [id number]

## 2024-04-19 NOTE — PROGRESS NOTES
Attempted to call pt with Eliquis hold instructions. Left detailed voicemail letting him know to start holding his Eliquis on 4/20. I requested he please call our OR  back to discuss scheduling details for his procedure. Also sent MyC message.    FLORIN PhilippeN, RN  RN Care Coordinator  [facility] Vascular Surgery - [facility] phone: [phone]  Fax: [phone]

## 2024-04-19 NOTE — TELEPHONE ENCOUNTER
Contacted pt via phone. He does not have a  to take him home from surgery on Tuesday and states he has no one to assist him post-op besides his wife who is disabled. Per [facility] policy pt does need a responsible adult to take them home post-operatively. Will discuss with surgeon.     FLORIN PhilippeN, RN  RN Care Coordinator  [facility] Vascular Surgery - [facility] phone: [phone]  Fax: [phone]

## 2024-04-19 NOTE — TELEPHONE ENCOUNTER
[facility] Call Center    Phone Message    May a detailed message be left on voicemail: yes     Reason for Call: Other: pt was returning message. I tried to ask pt to check his MyChart as well. Pt hung up before finishing message. Thanks      Action Taken: Message routed to:  [facility] IR     Travel Screening: Not Applicable

## 2024-04-22 ENCOUNTER — ANESTHESIA EVENT (OUTPATIENT)
Dept: SURGERY | Facility: CLINIC | Age: 64
End: 2024-04-22
Payer: MEDICARE

## 2024-04-22 ENCOUNTER — MYC MEDICAL ADVICE (OUTPATIENT)
Dept: FAMILY MEDICINE | Facility: CLINIC | Age: 64
End: 2024-04-22
Payer: MEDICARE

## 2024-04-22 DIAGNOSIS — G89.4 CHRONIC PAIN SYNDROME: ICD-10-CM

## 2024-04-22 DIAGNOSIS — M25.571 CHRONIC PAIN OF RIGHT ANKLE: ICD-10-CM

## 2024-04-22 DIAGNOSIS — M65.28 CALCIFIC ACHILLES TENDINITIS: ICD-10-CM

## 2024-04-22 DIAGNOSIS — M79.671 INTRACTABLE RIGHT HEEL PAIN: ICD-10-CM

## 2024-04-22 DIAGNOSIS — S86.011A RUPTURE OF RIGHT ACHILLES TENDON, INITIAL ENCOUNTER: ICD-10-CM

## 2024-04-22 DIAGNOSIS — M21.6X1 ACQUIRED CALCANEUS DEFORMITY OF RIGHT ANKLE: ICD-10-CM

## 2024-04-22 DIAGNOSIS — Z98.890 H/O FOOT SURGERY: ICD-10-CM

## 2024-04-22 DIAGNOSIS — G89.29 CHRONIC PAIN OF RIGHT ANKLE: ICD-10-CM

## 2024-04-22 RX ORDER — NALOXONE HYDROCHLORIDE 0.4 MG/ML
0.1 INJECTION, SOLUTION INTRAMUSCULAR; INTRAVENOUS; SUBCUTANEOUS
Status: CANCELLED | OUTPATIENT
Start: 2024-04-22

## 2024-04-22 RX ORDER — OXYCODONE HYDROCHLORIDE 10 MG/1
10 TABLET ORAL
Status: CANCELLED | OUTPATIENT
Start: 2024-04-22

## 2024-04-22 RX ORDER — OXYCODONE HYDROCHLORIDE 5 MG/1
5 TABLET ORAL
Status: CANCELLED | OUTPATIENT
Start: 2024-04-22

## 2024-04-22 RX ORDER — ONDANSETRON 2 MG/ML
4 INJECTION INTRAMUSCULAR; INTRAVENOUS EVERY 30 MIN PRN
Status: CANCELLED | OUTPATIENT
Start: 2024-04-22

## 2024-04-22 RX ORDER — OXYCODONE HYDROCHLORIDE 10 MG/1
10 TABLET ORAL DAILY PRN
Qty: 30 TABLET | Refills: 0 | Status: SHIPPED | OUTPATIENT
Start: 2024-04-22 | End: 2024-05-07

## 2024-04-22 RX ORDER — ONDANSETRON 4 MG/1
4 TABLET, ORALLY DISINTEGRATING ORAL EVERY 30 MIN PRN
Status: CANCELLED | OUTPATIENT
Start: 2024-04-22

## 2024-04-22 NOTE — TELEPHONE ENCOUNTER
JW,      Please see aisle411 message  Last OV 3/8/24    Rx sent 4/8/24 for 5 mg    Thank you,  Isela Villar RN

## 2024-04-23 ENCOUNTER — ANESTHESIA (OUTPATIENT)
Dept: SURGERY | Facility: CLINIC | Age: 64
End: 2024-04-23
Payer: MEDICARE

## 2024-04-23 ENCOUNTER — HOSPITAL ENCOUNTER (OUTPATIENT)
Facility: CLINIC | Age: 64
Discharge: HOME | End: 2024-04-24
Attending: SURGERY | Admitting: SURGERY
Payer: MEDICARE

## 2024-04-23 ENCOUNTER — APPOINTMENT (OUTPATIENT)
Dept: INTERVENTIONAL RADIOLOGY/VASCULAR | Facility: CLINIC | Age: 64
End: 2024-04-23
Attending: STUDENT IN AN ORGANIZED HEALTH CARE EDUCATION/TRAINING PROGRAM
Payer: MEDICARE

## 2024-04-23 DIAGNOSIS — I73.9 PVD (PERIPHERAL VASCULAR DISEASE) (H): ICD-10-CM

## 2024-04-23 DIAGNOSIS — I70.228 ATHEROSCLEROSIS OF NATIVE ARTERIES OF EXTREMITIES WITH REST PAIN, OTHER EXTREMITY (H): ICD-10-CM

## 2024-04-23 DIAGNOSIS — I73.9 PAD (PERIPHERAL ARTERY DISEASE) (H): ICD-10-CM

## 2024-04-23 LAB
ACT BLD: 193 SECONDS (ref 74–150)
ANION GAP SERPL CALCULATED.3IONS-SCNC: 10 MMOL/L (ref 7–15)
BUN SERPL-MCNC: 11.5 MG/DL (ref 8–23)
CALCIUM SERPL-MCNC: 8.9 MG/DL (ref 8.8–10.2)
CHLORIDE SERPL-SCNC: 108 MMOL/L (ref 98–107)
CHOLEST SERPL-MCNC: 143 MG/DL
CREAT SERPL-MCNC: 0.67 MG/DL (ref 0.67–1.17)
DEPRECATED HCO3 PLAS-SCNC: 24 MMOL/L (ref 22–29)
EGFRCR SERPLBLD CKD-EPI 2021: >90 ML/MIN/1.73M2
ERYTHROCYTE [DISTWIDTH] IN BLOOD BY AUTOMATED COUNT: 12.5 % (ref 10–15)
FASTING STATUS PATIENT QL REPORTED: YES
GLUCOSE BLDC GLUCOMTR-MCNC: 110 MG/DL (ref 70–99)
GLUCOSE BLDC GLUCOMTR-MCNC: 120 MG/DL (ref 70–99)
GLUCOSE BLDC GLUCOMTR-MCNC: 56 MG/DL (ref 70–99)
GLUCOSE BLDC GLUCOMTR-MCNC: 90 MG/DL (ref 70–99)
GLUCOSE BLDC GLUCOMTR-MCNC: 99 MG/DL (ref 70–99)
GLUCOSE SERPL-MCNC: 72 MG/DL (ref 70–99)
HCT VFR BLD AUTO: 39.9 % (ref 40–53)
HDLC SERPL-MCNC: 60 MG/DL
HGB BLD-MCNC: 13.7 G/DL (ref 13.3–17.7)
LDLC SERPL CALC-MCNC: 70 MG/DL
MCH RBC QN AUTO: 30.2 PG (ref 26.5–33)
MCHC RBC AUTO-ENTMCNC: 34.3 G/DL (ref 31.5–36.5)
MCV RBC AUTO: 88 FL (ref 78–100)
NONHDLC SERPL-MCNC: 83 MG/DL
PLATELET # BLD AUTO: 184 10E3/UL (ref 150–450)
POTASSIUM SERPL-SCNC: 3.7 MMOL/L (ref 3.4–5.3)
RBC # BLD AUTO: 4.54 10E6/UL (ref 4.4–5.9)
SODIUM SERPL-SCNC: 142 MMOL/L (ref 135–145)
TRIGL SERPL-MCNC: 65 MG/DL
WBC # BLD AUTO: 7.4 10E3/UL (ref 4–11)

## 2024-04-23 PROCEDURE — 360N000082 HC SURGERY LEVEL 2 W/ FLUORO, PER MIN: Performed by: SURGERY

## 2024-04-23 PROCEDURE — 250N000011 HC RX IP 250 OP 636: Performed by: ANESTHESIOLOGY

## 2024-04-23 PROCEDURE — 82374 ASSAY BLOOD CARBON DIOXIDE: CPT | Performed by: SURGERY

## 2024-04-23 PROCEDURE — C1874 STENT, COATED/COV W/DEL SYS: HCPCS | Performed by: SURGERY

## 2024-04-23 PROCEDURE — C1753 CATH, INTRAVAS ULTRASOUND: HCPCS | Performed by: SURGERY

## 2024-04-23 PROCEDURE — 258N000001 HC RX 258: Performed by: ANESTHESIOLOGY

## 2024-04-23 PROCEDURE — 82465 ASSAY BLD/SERUM CHOLESTEROL: CPT | Performed by: SURGERY

## 2024-04-23 PROCEDURE — C1753 CATH, INTRAVAS ULTRASOUND: HCPCS

## 2024-04-23 PROCEDURE — C1769 GUIDE WIRE: HCPCS | Performed by: SURGERY

## 2024-04-23 PROCEDURE — 250N000024 HC ISOFLURANE, PER MIN: Performed by: SURGERY

## 2024-04-23 PROCEDURE — 999N000083 IR LOWER EXTREMITY ANGIOGRAM RIGHT: Mod: RT

## 2024-04-23 PROCEDURE — 75710 ARTERY X-RAYS ARM/LEG: CPT | Mod: XU,RT

## 2024-04-23 PROCEDURE — C1887 CATHETER, GUIDING: HCPCS | Performed by: SURGERY

## 2024-04-23 PROCEDURE — 250N000009 HC RX 250: Performed by: NURSE ANESTHETIST, CERTIFIED REGISTERED

## 2024-04-23 PROCEDURE — C1725 CATH, TRANSLUMIN NON-LASER: HCPCS | Performed by: SURGERY

## 2024-04-23 PROCEDURE — 76499 UNLISTED DX RADIOGRAPHIC PX: CPT | Mod: XU,RT

## 2024-04-23 PROCEDURE — 255N000002 HC RX 255 OP 636: Performed by: SURGERY

## 2024-04-23 PROCEDURE — 250N000013 HC RX MED GY IP 250 OP 250 PS 637: Performed by: STUDENT IN AN ORGANIZED HEALTH CARE EDUCATION/TRAINING PROGRAM

## 2024-04-23 PROCEDURE — 250N000011 HC RX IP 250 OP 636: Performed by: NURSE ANESTHETIST, CERTIFIED REGISTERED

## 2024-04-23 PROCEDURE — C1894 INTRO/SHEATH, NON-LASER: HCPCS | Performed by: SURGERY

## 2024-04-23 PROCEDURE — 272N000001 HC OR GENERAL SUPPLY STERILE: Performed by: SURGERY

## 2024-04-23 PROCEDURE — 250N000011 HC RX IP 250 OP 636: Performed by: SURGERY

## 2024-04-23 PROCEDURE — 37226: CPT | Performed by: ANESTHESIOLOGY

## 2024-04-23 PROCEDURE — 258N000003 HC RX IP 258 OP 636: Performed by: NURSE ANESTHETIST, CERTIFIED REGISTERED

## 2024-04-23 PROCEDURE — 85027 COMPLETE CBC AUTOMATED: CPT | Performed by: SURGERY

## 2024-04-23 PROCEDURE — 82962 GLUCOSE BLOOD TEST: CPT

## 2024-04-23 PROCEDURE — 85347 COAGULATION TIME ACTIVATED: CPT

## 2024-04-23 PROCEDURE — 37226 PR REVASCULARIZE FEM/POP ARTERY, ANGIOPLASTY/STENT: CPT | Mod: RT | Performed by: SURGERY

## 2024-04-23 PROCEDURE — 36415 COLL VENOUS BLD VENIPUNCTURE: CPT | Performed by: SURGERY

## 2024-04-23 PROCEDURE — 999N000141 HC STATISTIC PRE-PROCEDURE NURSING ASSESSMENT: Performed by: SURGERY

## 2024-04-23 PROCEDURE — 710N000010 HC RECOVERY PHASE 1, LEVEL 2, PER MIN: Performed by: SURGERY

## 2024-04-23 PROCEDURE — 37226: CPT | Performed by: NURSE ANESTHETIST, CERTIFIED REGISTERED

## 2024-04-23 PROCEDURE — C1760 CLOSURE DEV, VASC: HCPCS | Performed by: SURGERY

## 2024-04-23 PROCEDURE — 370N000017 HC ANESTHESIA TECHNICAL FEE, PER MIN: Performed by: SURGERY

## 2024-04-23 PROCEDURE — 258N000003 HC RX IP 258 OP 636: Performed by: SURGERY

## 2024-04-23 PROCEDURE — 120N000002 HC R&B MED SURG/OB UMMC

## 2024-04-23 DEVICE — DRUG-ELUTING VASCULAR STENT SYSTEM
Type: IMPLANTABLE DEVICE | Site: LEG | Status: FUNCTIONAL
Brand: ELUVIA™

## 2024-04-23 RX ORDER — HYDROMORPHONE HCL IN WATER/PF 6 MG/30 ML
0.2 PATIENT CONTROLLED ANALGESIA SYRINGE INTRAVENOUS EVERY 5 MIN PRN
Status: DISCONTINUED | OUTPATIENT
Start: 2024-04-23 | End: 2024-04-23

## 2024-04-23 RX ORDER — OXYCODONE HYDROCHLORIDE 5 MG/1
5 TABLET ORAL EVERY 4 HOURS PRN
Status: DISCONTINUED | OUTPATIENT
Start: 2024-04-23 | End: 2024-04-24 | Stop reason: HOSPADM

## 2024-04-23 RX ORDER — SODIUM CHLORIDE, SODIUM LACTATE, POTASSIUM CHLORIDE, CALCIUM CHLORIDE 600; 310; 30; 20 MG/100ML; MG/100ML; MG/100ML; MG/100ML
INJECTION, SOLUTION INTRAVENOUS CONTINUOUS
Status: DISCONTINUED | OUTPATIENT
Start: 2024-04-23 | End: 2024-04-23 | Stop reason: HOSPADM

## 2024-04-23 RX ORDER — SODIUM CHLORIDE 9 MG/ML
INJECTION, SOLUTION INTRAVENOUS CONTINUOUS
Status: DISCONTINUED | OUTPATIENT
Start: 2024-04-23 | End: 2024-04-24

## 2024-04-23 RX ORDER — NALOXONE HYDROCHLORIDE 0.4 MG/ML
0.2 INJECTION, SOLUTION INTRAMUSCULAR; INTRAVENOUS; SUBCUTANEOUS
Status: DISCONTINUED | OUTPATIENT
Start: 2024-04-23 | End: 2024-04-24 | Stop reason: HOSPADM

## 2024-04-23 RX ORDER — SERTRALINE HYDROCHLORIDE 100 MG/1
200 TABLET, FILM COATED ORAL EVERY MORNING
Status: DISCONTINUED | OUTPATIENT
Start: 2024-04-24 | End: 2024-04-24 | Stop reason: HOSPADM

## 2024-04-23 RX ORDER — NOREPINEPHRINE BITARTRATE 0.02 MG/ML
INJECTION, SOLUTION INTRAVENOUS CONTINUOUS PRN
Status: DISCONTINUED | OUTPATIENT
Start: 2024-04-23 | End: 2024-04-23

## 2024-04-23 RX ORDER — DOXAZOSIN 4 MG/1
8 TABLET ORAL AT BEDTIME
Status: DISCONTINUED | OUTPATIENT
Start: 2024-04-23 | End: 2024-04-24 | Stop reason: HOSPADM

## 2024-04-23 RX ORDER — LIDOCAINE 40 MG/G
CREAM TOPICAL
Status: DISCONTINUED | OUTPATIENT
Start: 2024-04-23 | End: 2024-04-24 | Stop reason: HOSPADM

## 2024-04-23 RX ORDER — ALBUTEROL SULFATE 90 UG/1
2 AEROSOL, METERED RESPIRATORY (INHALATION) EVERY 6 HOURS PRN
Status: DISCONTINUED | OUTPATIENT
Start: 2024-04-23 | End: 2024-04-24 | Stop reason: HOSPADM

## 2024-04-23 RX ORDER — NALOXONE HYDROCHLORIDE 0.4 MG/ML
0.4 INJECTION, SOLUTION INTRAMUSCULAR; INTRAVENOUS; SUBCUTANEOUS
Status: DISCONTINUED | OUTPATIENT
Start: 2024-04-23 | End: 2024-04-24 | Stop reason: HOSPADM

## 2024-04-23 RX ORDER — CEFAZOLIN SODIUM 1 G/3ML
INJECTION, POWDER, FOR SOLUTION INTRAMUSCULAR; INTRAVENOUS PRN
Status: DISCONTINUED | OUTPATIENT
Start: 2024-04-23 | End: 2024-04-23

## 2024-04-23 RX ORDER — BACLOFEN 20 MG/1
20 TABLET ORAL ONCE
Status: DISCONTINUED | OUTPATIENT
Start: 2024-04-23 | End: 2024-04-23

## 2024-04-23 RX ORDER — ONDANSETRON 4 MG/1
4 TABLET, ORALLY DISINTEGRATING ORAL EVERY 30 MIN PRN
Status: DISCONTINUED | OUTPATIENT
Start: 2024-04-23 | End: 2024-04-23

## 2024-04-23 RX ORDER — PROPRANOLOL HYDROCHLORIDE 10 MG/1
40 TABLET ORAL 3 TIMES DAILY
Status: DISCONTINUED | OUTPATIENT
Start: 2024-04-23 | End: 2024-04-24 | Stop reason: HOSPADM

## 2024-04-23 RX ORDER — LIDOCAINE 40 MG/G
CREAM TOPICAL
Status: DISCONTINUED | OUTPATIENT
Start: 2024-04-23 | End: 2024-04-23 | Stop reason: HOSPADM

## 2024-04-23 RX ORDER — BISACODYL 10 MG
10 SUPPOSITORY, RECTAL RECTAL DAILY PRN
Status: DISCONTINUED | OUTPATIENT
Start: 2024-04-26 | End: 2024-04-24 | Stop reason: HOSPADM

## 2024-04-23 RX ORDER — HEPARIN SODIUM 1000 [USP'U]/ML
INJECTION, SOLUTION INTRAVENOUS; SUBCUTANEOUS PRN
Status: DISCONTINUED | OUTPATIENT
Start: 2024-04-23 | End: 2024-04-23

## 2024-04-23 RX ORDER — ONDANSETRON 2 MG/ML
4 INJECTION INTRAMUSCULAR; INTRAVENOUS EVERY 30 MIN PRN
Status: DISCONTINUED | OUTPATIENT
Start: 2024-04-23 | End: 2024-04-23

## 2024-04-23 RX ORDER — ONDANSETRON 2 MG/ML
4 INJECTION INTRAMUSCULAR; INTRAVENOUS EVERY 6 HOURS PRN
Status: DISCONTINUED | OUTPATIENT
Start: 2024-04-23 | End: 2024-04-24 | Stop reason: HOSPADM

## 2024-04-23 RX ORDER — LIDOCAINE HYDROCHLORIDE 20 MG/ML
INJECTION, SOLUTION INFILTRATION; PERINEURAL PRN
Status: DISCONTINUED | OUTPATIENT
Start: 2024-04-23 | End: 2024-04-23

## 2024-04-23 RX ORDER — HYDROMORPHONE HCL IN WATER/PF 6 MG/30 ML
0.2 PATIENT CONTROLLED ANALGESIA SYRINGE INTRAVENOUS
Status: DISCONTINUED | OUTPATIENT
Start: 2024-04-23 | End: 2024-04-24 | Stop reason: HOSPADM

## 2024-04-23 RX ORDER — NALOXONE HYDROCHLORIDE 0.4 MG/ML
0.1 INJECTION, SOLUTION INTRAMUSCULAR; INTRAVENOUS; SUBCUTANEOUS
Status: DISCONTINUED | OUTPATIENT
Start: 2024-04-23 | End: 2024-04-23

## 2024-04-23 RX ORDER — PROMETHAZINE HYDROCHLORIDE 25 MG/1
25 TABLET ORAL 3 TIMES DAILY PRN
Status: DISCONTINUED | OUTPATIENT
Start: 2024-04-23 | End: 2024-04-24 | Stop reason: HOSPADM

## 2024-04-23 RX ORDER — HYDROMORPHONE HCL IN WATER/PF 6 MG/30 ML
0.4 PATIENT CONTROLLED ANALGESIA SYRINGE INTRAVENOUS EVERY 5 MIN PRN
Status: DISCONTINUED | OUTPATIENT
Start: 2024-04-23 | End: 2024-04-23

## 2024-04-23 RX ORDER — SODIUM CHLORIDE, SODIUM LACTATE, POTASSIUM CHLORIDE, CALCIUM CHLORIDE 600; 310; 30; 20 MG/100ML; MG/100ML; MG/100ML; MG/100ML
INJECTION, SOLUTION INTRAVENOUS CONTINUOUS PRN
Status: DISCONTINUED | OUTPATIENT
Start: 2024-04-23 | End: 2024-04-23

## 2024-04-23 RX ORDER — ONDANSETRON 4 MG/1
4 TABLET, ORALLY DISINTEGRATING ORAL EVERY 6 HOURS PRN
Status: DISCONTINUED | OUTPATIENT
Start: 2024-04-23 | End: 2024-04-24 | Stop reason: HOSPADM

## 2024-04-23 RX ORDER — IODIXANOL 320 MG/ML
100 INJECTION, SOLUTION INTRAVASCULAR ONCE
Status: COMPLETED | OUTPATIENT
Start: 2024-04-23 | End: 2024-04-23

## 2024-04-23 RX ORDER — ONDANSETRON 2 MG/ML
INJECTION INTRAMUSCULAR; INTRAVENOUS PRN
Status: DISCONTINUED | OUTPATIENT
Start: 2024-04-23 | End: 2024-04-23

## 2024-04-23 RX ORDER — TRIAMTERENE AND HYDROCHLOROTHIAZIDE 37.5; 25 MG/1; MG/1
2 TABLET ORAL DAILY
Status: DISCONTINUED | OUTPATIENT
Start: 2024-04-24 | End: 2024-04-24

## 2024-04-23 RX ORDER — DEXTROSE MONOHYDRATE 25 G/50ML
25-50 INJECTION, SOLUTION INTRAVENOUS
Status: DISCONTINUED | OUTPATIENT
Start: 2024-04-23 | End: 2024-04-24 | Stop reason: HOSPADM

## 2024-04-23 RX ORDER — BACLOFEN 10 MG/1
20 TABLET ORAL 3 TIMES DAILY
Status: DISCONTINUED | OUTPATIENT
Start: 2024-04-23 | End: 2024-04-24 | Stop reason: HOSPADM

## 2024-04-23 RX ORDER — AMOXICILLIN 250 MG
1 CAPSULE ORAL 2 TIMES DAILY
Status: DISCONTINUED | OUTPATIENT
Start: 2024-04-23 | End: 2024-04-24 | Stop reason: HOSPADM

## 2024-04-23 RX ORDER — SODIUM CHLORIDE, SODIUM LACTATE, POTASSIUM CHLORIDE, CALCIUM CHLORIDE 600; 310; 30; 20 MG/100ML; MG/100ML; MG/100ML; MG/100ML
INJECTION, SOLUTION INTRAVENOUS CONTINUOUS
Status: DISCONTINUED | OUTPATIENT
Start: 2024-04-23 | End: 2024-04-23

## 2024-04-23 RX ORDER — OXYCODONE HYDROCHLORIDE 10 MG/1
10 TABLET ORAL EVERY 4 HOURS PRN
Status: DISCONTINUED | OUTPATIENT
Start: 2024-04-23 | End: 2024-04-24 | Stop reason: HOSPADM

## 2024-04-23 RX ORDER — DEXTROSE MONOHYDRATE 25 G/50ML
25 INJECTION, SOLUTION INTRAVENOUS ONCE
Status: COMPLETED | OUTPATIENT
Start: 2024-04-23 | End: 2024-04-23

## 2024-04-23 RX ORDER — FENTANYL CITRATE 50 UG/ML
50 INJECTION, SOLUTION INTRAMUSCULAR; INTRAVENOUS EVERY 5 MIN PRN
Status: DISCONTINUED | OUTPATIENT
Start: 2024-04-23 | End: 2024-04-23

## 2024-04-23 RX ORDER — GABAPENTIN 300 MG/1
300 CAPSULE ORAL 2 TIMES DAILY
Status: DISCONTINUED | OUTPATIENT
Start: 2024-04-23 | End: 2024-04-24 | Stop reason: HOSPADM

## 2024-04-23 RX ORDER — ASPIRIN 81 MG/1
81 TABLET ORAL DAILY
Status: DISCONTINUED | OUTPATIENT
Start: 2024-04-23 | End: 2024-04-24 | Stop reason: HOSPADM

## 2024-04-23 RX ORDER — PROPOFOL 10 MG/ML
INJECTION, EMULSION INTRAVENOUS PRN
Status: DISCONTINUED | OUTPATIENT
Start: 2024-04-23 | End: 2024-04-23

## 2024-04-23 RX ORDER — TRIAMTERENE AND HYDROCHLOROTHIAZIDE 37.5; 25 MG/1; MG/1
1 CAPSULE ORAL EVERY MORNING
Status: DISCONTINUED | OUTPATIENT
Start: 2024-04-24 | End: 2024-04-23

## 2024-04-23 RX ORDER — POLYETHYLENE GLYCOL 3350 17 G/17G
17 POWDER, FOR SOLUTION ORAL DAILY
Status: DISCONTINUED | OUTPATIENT
Start: 2024-04-24 | End: 2024-04-24 | Stop reason: HOSPADM

## 2024-04-23 RX ORDER — FENTANYL CITRATE 50 UG/ML
INJECTION, SOLUTION INTRAMUSCULAR; INTRAVENOUS PRN
Status: DISCONTINUED | OUTPATIENT
Start: 2024-04-23 | End: 2024-04-23

## 2024-04-23 RX ORDER — FENTANYL CITRATE 50 UG/ML
25 INJECTION, SOLUTION INTRAMUSCULAR; INTRAVENOUS EVERY 5 MIN PRN
Status: DISCONTINUED | OUTPATIENT
Start: 2024-04-23 | End: 2024-04-23

## 2024-04-23 RX ORDER — HYDROMORPHONE HCL IN WATER/PF 6 MG/30 ML
0.4 PATIENT CONTROLLED ANALGESIA SYRINGE INTRAVENOUS
Status: DISCONTINUED | OUTPATIENT
Start: 2024-04-23 | End: 2024-04-24 | Stop reason: HOSPADM

## 2024-04-23 RX ORDER — NICOTINE POLACRILEX 4 MG
15-30 LOZENGE BUCCAL
Status: DISCONTINUED | OUTPATIENT
Start: 2024-04-23 | End: 2024-04-24 | Stop reason: HOSPADM

## 2024-04-23 RX ADMIN — FENTANYL CITRATE 50 MCG: 50 INJECTION INTRAMUSCULAR; INTRAVENOUS at 14:57

## 2024-04-23 RX ADMIN — FENTANYL CITRATE 50 MCG: 50 INJECTION INTRAMUSCULAR; INTRAVENOUS at 14:15

## 2024-04-23 RX ADMIN — NOREPINEPHRINE BITARTRATE 12.8 MCG: 1 INJECTION, SOLUTION, CONCENTRATE INTRAVENOUS at 14:43

## 2024-04-23 RX ADMIN — LIDOCAINE HYDROCHLORIDE 100 MG: 20 INJECTION, SOLUTION INFILTRATION; PERINEURAL at 14:22

## 2024-04-23 RX ADMIN — SODIUM CHLORIDE, POTASSIUM CHLORIDE, SODIUM LACTATE AND CALCIUM CHLORIDE: 600; 310; 30; 20 INJECTION, SOLUTION INTRAVENOUS at 14:15

## 2024-04-23 RX ADMIN — DEXMEDETOMIDINE HYDROCHLORIDE 12 MCG: 100 INJECTION, SOLUTION INTRAVENOUS at 14:19

## 2024-04-23 RX ADMIN — DOXAZOSIN 8 MG: 4 TABLET ORAL at 23:01

## 2024-04-23 RX ADMIN — PROPRANOLOL HYDROCHLORIDE 40 MG: 10 TABLET ORAL at 23:04

## 2024-04-23 RX ADMIN — HEPARIN SODIUM 5000 UNITS: 1000 INJECTION INTRAVENOUS; SUBCUTANEOUS at 15:41

## 2024-04-23 RX ADMIN — BACLOFEN 20 MG: 10 TABLET ORAL at 23:01

## 2024-04-23 RX ADMIN — IODIXANOL 80 ML: 320 INJECTION, SOLUTION INTRAVASCULAR at 17:15

## 2024-04-23 RX ADMIN — FENTANYL CITRATE 50 MCG: 50 INJECTION, SOLUTION INTRAMUSCULAR; INTRAVENOUS at 17:15

## 2024-04-23 RX ADMIN — NOREPINEPHRINE BITARTRATE 6.4 MCG: 1 INJECTION, SOLUTION, CONCENTRATE INTRAVENOUS at 15:31

## 2024-04-23 RX ADMIN — PROPOFOL 200 MG: 10 INJECTION, EMULSION INTRAVENOUS at 14:22

## 2024-04-23 RX ADMIN — NOREPINEPHRINE BITARTRATE 6.4 MCG: 1 INJECTION, SOLUTION, CONCENTRATE INTRAVENOUS at 14:49

## 2024-04-23 RX ADMIN — SODIUM CHLORIDE, POTASSIUM CHLORIDE, SODIUM LACTATE AND CALCIUM CHLORIDE: 600; 310; 30; 20 INJECTION, SOLUTION INTRAVENOUS at 15:14

## 2024-04-23 RX ADMIN — SODIUM CHLORIDE, POTASSIUM CHLORIDE, SODIUM LACTATE AND CALCIUM CHLORIDE: 600; 310; 30; 20 INJECTION, SOLUTION INTRAVENOUS at 14:28

## 2024-04-23 RX ADMIN — MIDAZOLAM 2 MG: 1 INJECTION INTRAMUSCULAR; INTRAVENOUS at 14:15

## 2024-04-23 RX ADMIN — Medication 50 MG: at 14:25

## 2024-04-23 RX ADMIN — ONDANSETRON 4 MG: 2 INJECTION INTRAMUSCULAR; INTRAVENOUS at 16:23

## 2024-04-23 RX ADMIN — NOREPINEPHRINE BITARTRATE 6.4 MCG: 1 INJECTION, SOLUTION, CONCENTRATE INTRAVENOUS at 15:20

## 2024-04-23 RX ADMIN — Medication 10 MG: at 16:26

## 2024-04-23 RX ADMIN — CEFAZOLIN 2 G: 1 INJECTION, POWDER, FOR SOLUTION INTRAMUSCULAR; INTRAVENOUS at 14:28

## 2024-04-23 RX ADMIN — ASPIRIN 81 MG: 81 TABLET ORAL at 23:01

## 2024-04-23 RX ADMIN — DEXTROSE MONOHYDRATE 25 ML: 25 INJECTION, SOLUTION INTRAVENOUS at 12:30

## 2024-04-23 RX ADMIN — NOREPINEPHRINE BITARTRATE 6.4 MCG: 1 INJECTION, SOLUTION, CONCENTRATE INTRAVENOUS at 15:09

## 2024-04-23 RX ADMIN — Medication 0.02 MCG/KG/MIN: at 15:46

## 2024-04-23 RX ADMIN — GABAPENTIN 300 MG: 300 CAPSULE ORAL at 23:01

## 2024-04-23 RX ADMIN — Medication 20 MG: at 15:10

## 2024-04-23 RX ADMIN — SUCCINYLCHOLINE CHLORIDE 100 MG: 20 INJECTION, SOLUTION INTRAMUSCULAR; INTRAVENOUS; PARENTERAL at 14:22

## 2024-04-23 RX ADMIN — FENTANYL CITRATE 50 MCG: 50 INJECTION INTRAMUSCULAR; INTRAVENOUS at 15:41

## 2024-04-23 RX ADMIN — FENTANYL CITRATE 50 MCG: 50 INJECTION, SOLUTION INTRAMUSCULAR; INTRAVENOUS at 17:48

## 2024-04-23 RX ADMIN — DOCUSATE SODIUM 50 MG AND SENNOSIDES 8.6 MG 1 TABLET: 8.6; 5 TABLET, FILM COATED ORAL at 23:01

## 2024-04-23 RX ADMIN — Medication 20 MG: at 15:37

## 2024-04-23 RX ADMIN — DEXMEDETOMIDINE HYDROCHLORIDE 16 MCG: 100 INJECTION, SOLUTION INTRAVENOUS at 14:15

## 2024-04-23 ASSESSMENT — ENCOUNTER SYMPTOMS
ORTHOPNEA: 0
DYSRHYTHMIAS: 1
SEIZURES: 0

## 2024-04-23 ASSESSMENT — ACTIVITIES OF DAILY LIVING (ADL)
ADLS_ACUITY_SCORE: 25
ADLS_ACUITY_SCORE: 35
ADLS_ACUITY_SCORE: 25
ADLS_ACUITY_SCORE: 25
ADLS_ACUITY_SCORE: 33
ADLS_ACUITY_SCORE: 33
ADLS_ACUITY_SCORE: 22
ADLS_ACUITY_SCORE: 25
ADLS_ACUITY_SCORE: 25

## 2024-04-23 ASSESSMENT — COPD QUESTIONNAIRES: COPD: 0

## 2024-04-23 ASSESSMENT — LIFESTYLE VARIABLES: TOBACCO_USE: 1

## 2024-04-23 NOTE — ANESTHESIA CARE TRANSFER NOTE
Patient: Erwin Aguilar    Procedure: Procedure(s):  Ultrasound guided percutaneous transarterial left common femoral artery access, diagnostic aortoiliac angiogram, selective cannulation of right comon iliac, righ external iliac, right common femoral, and right superficial femoral artery, balloon angioplasty of right superficial femoral artery, 6mm x 12 cm self-expanding drug-coated stent placement of right superficial femoral artery, left common femoral arteriotomy closure with Perclose device       Diagnosis: PAD (peripheral artery disease) (H24) [I73.9]  Diagnosis Additional Information: No value filed.    Anesthesia Type:   General     Note:    Oropharynx: oropharynx clear of all foreign objects and spontaneously breathing  Level of Consciousness: awake      Independent Airway: airway patency satisfactory and stable  Dentition: dentition unchanged  Vital Signs Stable: post-procedure vital signs reviewed and stable  Report to RN Given: handoff report given  Patient transferred to: PACU    Handoff Report: Identifed the Patient, Identified the Reponsible Provider, Reviewed the pertinent medical history, Discussed the surgical course, Reviewed Intra-OP anesthesia mangement and issues during anesthesia, Set expectations for post-procedure period and Allowed opportunity for questions and acknowledgement of understanding    Vitals:  Vitals Value Taken Time   /76 04/23/24 1651   Temp     Pulse     Resp     SpO2     Vitals shown include unfiled device data.    Electronically Signed By: VANCE Wiseman CRNA  April 23, 2024  5:01 PM

## 2024-04-23 NOTE — ANESTHESIA PROCEDURE NOTES
Airway       Patient location during procedure: OR       Procedure Start/Stop Times: 4/23/2024 2:24 PM  Staff -        CRNA: Jostin Lennon APRN CRNA       Performed By: CRNA  Consent for Airway        Urgency: elective  Indications and Patient Condition       Indications for airway management: lety-procedural       Induction type:intravenous      Final Airway Details       Final airway type: endotracheal airway       Successful airway: ETT - single  Endotracheal Airway Details        ETT size (mm): 7.0       Cuffed: yes       Successful intubation technique: direct laryngoscopy       DL Blade Type: Ward 2       Grade View of Cords: 1       Adjucts: stylet       Position: Right       Measured from: lips       Secured at (cm): 23       Bite block used: None    Post intubation assessment        Placement verified by: capnometry        Number of attempts at approach: 1       Number of other approaches attempted: 0       Secured with: tape       Ease of procedure: easy       Dentition: Intact and Unchanged    Medication(s) Administered   Medication Administration Time: 4/23/2024 2:24 PM

## 2024-04-23 NOTE — ANESTHESIA PREPROCEDURE EVALUATION
Anesthesia Pre-Procedure Evaluation    Patient: Erwin Aguilar   MRN: [id number] : 1960        Procedure : Procedure(s)  Right lower extremity angiogram with possible intervention.          Past Medical History:   Diagnosis Date    Actinic keratosis     aldara    Anxiety     Cancer (H)     squamous cell skin CA    Cauda equina spinal cord injury (H)     Chronic sinusitis 16    Depressive disorder     Diastasis recti     Esophageal reflux     Esophageal varices in cirrhosis (H) 2014    Hospitalized for UGI blee 3/28/14, endoscopy revealed bleeding varices.    Essential hypertension, benign     Intermittent asthma     Mild depression     Mixed hyperlipidemia     Nasal polyps 16    Osteoarthritis of lumbar spine, unspecified spinal osteoarthritis complication status     Other chronic pain     Paroxysmal atrial fibrillation (H) 2021    SCCA (squamous cell carcinoma) of skin     Seasonal allergic conjunctivitis     Type II or unspecified type diabetes mellitus without mention of complication, not stated as uncontrolled     Unspecified site of spinal cord injury without evidence of spinal bone injury     due to back surgery      Past Surgical History:   Procedure Laterality Date    ABDOMEN SURGERY      BACK SURGERY  2009    COLONOSCOPY N/A 2016    Procedure: COMBINED COLONOSCOPY, SINGLE OR MULTIPLE BIOPSY/POLYPECTOMY BY BIOPSY;  Surgeon: Ana Paula Urbina MD;  Location: [facility]    DECOMPRESSION CUBITAL TUNNEL Left 2023    Procedure: LEFT CUBITAL TUNNEL RELEASE,;  Surgeon: Deny Dixon MD;  Location: [facility] OR    ENDOSCOPY UPPER, COLONOSCOPY, COMBINED  10/19/2011    Procedure:COMBINED ENDOSCOPY UPPER, COLONOSCOPY; Upper Endoscopy, Colonoscopy with Polypectomy x4; Surgeon:AMBAR RODRÍGUEZ; Location:[facility] OR    ENT SURGERY      Ongoing since then    ESOPHAGOSCOPY, GASTROSCOPY, DUODENOSCOPY (EGD), COMBINED  3/28/2014    Procedure: COMBINED ESOPHAGOSCOPY,  GASTROSCOPY, DUODENOSCOPY (EGD);  EGD, Gastric Biopsies, Esophageal Banding;  Surgeon: Reyna Tovar MD;  Location: [facility]    ESOPHAGOSCOPY, GASTROSCOPY, DUODENOSCOPY (EGD), COMBINED  6/9/2014    Procedure: COMBINED ESOPHAGOSCOPY, GASTROSCOPY, DUODENOSCOPY (EGD);  Surgeon: Curtis Mendez MD;  Location: [facility]    ESOPHAGOSCOPY, GASTROSCOPY, DUODENOSCOPY (EGD), COMBINED  7/24/2014    Procedure: COMBINED ESOPHAGOSCOPY, GASTROSCOPY, DUODENOSCOPY (EGD);  Surgeon: Gerard Samaniego MD;  Location: [facility]    ESOPHAGOSCOPY, GASTROSCOPY, DUODENOSCOPY (EGD), COMBINED N/A 10/31/2014    Procedure: COMBINED ESOPHAGOSCOPY, GASTROSCOPY, DUODENOSCOPY (EGD);  Surgeon: Gerard Samaniego MD;  Location: [facility]    ESOPHAGOSCOPY, GASTROSCOPY, DUODENOSCOPY (EGD), COMBINED N/A 5/12/2016    Procedure: COMBINED ESOPHAGOSCOPY, GASTROSCOPY, DUODENOSCOPY (EGD);  Surgeon: Ana Paula Urbina MD;  Location: [facility]    ESOPHAGOSCOPY, GASTROSCOPY, DUODENOSCOPY (EGD), COMBINED N/A 8/2/2018    Procedure: COMBINED ESOPHAGOSCOPY, GASTROSCOPY, DUODENOSCOPY (EGD);  EGD;  Surgeon: Yu Wagner MD;  Location: [facility]    HCL SQUAMOUS CELL CARCINOMA AG  10/07    left forearm    HERNIORRHAPHY UMBILICAL  11/8/2012    Procedure: HERNIORRHAPHY UMBILICAL;  Open Umbilical Hernia Repair With Mesh ;  Surgeon: Chase Nicholson MD;  Location: [facility]    INSERT STIMULATOR DORSAL COLUMN N/A 6/28/2018    Procedure: INSERT STIMULATOR DORSAL COLUMN;;  Surgeon: Elvis Sauceda MD;  Location: [facility]    neuro stimulator  2010    RELEASE CARPAL TUNNEL Left 8/31/2023    Procedure: LEFT OPEN CARPAL TUNNEL RELEASE,;  Surgeon: Deny Dixon MD;  Location: [facility]    RELEASE TRIGGER FINGER Left 8/31/2023    Procedure: Release left trigger finger thumb;  Surgeon: Deny Dixon MD;  Location: [facility] OR    REMOVE GENERATOR STIMULATOR (LOCATION) N/A 6/28/2018    Procedure: REMOVE GENERATOR STIMULATOR (LOCATION);  Spinal Cord Stimulator and  IPG Explant and Re-Implant of SCS System (Leads and IPG);  Surgeon: Elvis Sauceda MD;  Location: [facility]    REPAIR TENDON ACHILLES Right 2020    Procedure: Right achilles debridement and partial calcaneus excision;  Surgeon: Eh Sandoval MD;  Location: [facility] OR    SURGICAL HISTORY OF -       abcess tooth    SURGICAL HISTORY OF -       L4-5 laminectomy, cauda equina syndrome    SURGICAL HISTORY OF -   +    herniated disk repair    TONSILLECTOMY  10 1964    TRANSPOSITION ULNAR NERVE (ELBOW)      right    ZZC APPENDECTOMY  1974      Allergies   Allergen Reactions    Levaquin Anaphylaxis and Rash     Swelling in lip and tongue felt thick    Lisinopril Anaphylaxis     Swollen tongue; inability to swallow; drooling; hives; swollen face, neck, angioedema    Acetaminophen      Hx of cirrhosis     Amlodipine      Swelling, hives, possible angioedema      Morphine      b/p dropped and arms went numb  Hypotension    Quinolones Hives    Spironolactone Unknown     Pt believes himself to be allergic to it; cannot find his old records of this.-mjc     Bactrim [Sulfamethoxazole-Trimethoprim] Rash      Social History     Tobacco Use    Smoking status: Former     Current packs/day: 0.00     Types: Cigarettes     Start date: 10/13/1983     Quit date: 1984     Years since quittin.8     Passive exposure: Past    Smokeless tobacco: Never   Substance Use Topics    Alcohol use: Not Currently     Comment: a pint of alcohol / day - last drink was 3/28/14      Wt Readings from Last 1 Encounters:   24 94.3 kg (207 lb 14.3 oz)        Anesthesia Evaluation   Pt has had prior anesthetic. Type: General.    No history of anesthetic complications       ROS/MED HX  ENT/Pulmonary: Comment: Chronic sinusitis    (+)     ALISIA risk factors,  hypertension,         tobacco use, Past use,    Intermittent, asthma  Treatment: Inhaler prn,              (-) COPD and recent URI   Neurologic: Comment: History  of cauda equina    (+)    peripheral neuropathy,                 Spinal cord injury (surgical injury), year sustained: 2009,       (-) no seizures and no CVA   Cardiovascular:     (+) Dyslipidemia hypertension- Peripheral Vascular Disease-- Other and Symptomatic.   -  - -   Taking blood thinners                     dysrhythmias, a-fib,        Previous cardiac testing   Echo: Date: 9/7/2021 Results:  Interpretation Summary   Global and regional left ventricular function is normal with an EF of 55-60%.   Right ventricular function, chamber size, wall motion, and thickness are normal.   No hemodynamcially signifcant valvular abnormalities.   Estimated mean RA pressure is mildly elevated at 8 mmHg.   No pericardial effusion is present.   There is no prior study for direct comparison.  Stress Test:  Date: Results:    ECG Reviewed:  Date: 10/25/2021 Results:  Holter  Conclusion: A tendency toward sinus bradycardia was noted throughout the monitoring time with average resting heart rate approximately 45 bpm and average heart rate less than 65 bpm which could indicate some chronotropic incompetence in an active patient.  These findings could be consistent with sinus node dysfunction.  No atrial fibrillation was noted.   Cath:  Date: Results:   (-) CAD, CHAVEZ, orthopnea/PND and irregular heartbeat/palpitations   METS/Exercise Tolerance: 3 - Able to walk 1-2 blocks without stopping Comment: Limited due to chronic pain, occasionally walks with a cane. Denies cardiac symptoms such as chest pain/pressure, dizziness, CHAVEZ, orthopnea.    Riding electric bike, but only uses left foot to pedal - rides 18 miles per day.    Hematologic:  - neg hematologic  ROS   (+)      anemia, history of blood transfusion, no previous transfusion reaction, Known PRBC Anitbodies:No    (-) history of blood clots   Musculoskeletal: Comment: History of drop foot, right  Ruptured achilles tendon, right with chronic infection  (+)  arthritis,              GI/Hepatic: Comment: Gastroparesis  Chronic nausea on phenergan    (+)   esophageal disease, Varices,         liver disease (Cirrhosis),    (-) GERD   Renal/Genitourinary:  - neg Renal ROS     Endo:     (+)  type II DM, Last HgA1c: 5.9, date: 1/9/24, Using insulin, - not using insulin pump.  not previously admitted for DM/DKA.           (-) thyroid disease and chronic steroid usage   Psychiatric/Substance Use:     (+) psychiatric history anxiety and depression alcohol abuse (sober since 2014)  Recreational drug usage: Cannabis.    Infectious Disease:  - neg infectious disease ROS     Malignancy:   (+) Malignancy, History of Skin.Skin CA Remission status post Surgery.      Other:  - neg other ROS    (+)  , H/O Chronic Pain,         Physical Exam    Airway        Mallampati: I   TM distance: > 3 FB   Neck ROM: full   Mouth opening: > 3 cm    Respiratory Devices and Support         Dental       (+) Edentulous      Cardiovascular   cardiovascular exam normal          Pulmonary   pulmonary exam normal                OUTSIDE LABS:  CBC:   Lab Results   Component Value Date    WBC 7.1 01/05/2024    WBC 5.8 08/10/2023    HGB 15.7 01/05/2024    HGB 14.5 08/10/2023    HCT 45.1 01/05/2024    HCT 42.5 08/10/2023     01/05/2024     08/10/2023     BMP:   Lab Results   Component Value Date     01/05/2024     08/10/2023    POTASSIUM 3.6 01/05/2024    POTASSIUM 3.8 08/10/2023    CHLORIDE 101 01/05/2024    CHLORIDE 103 08/10/2023    CO2 27 01/05/2024    CO2 27 08/10/2023    BUN 8.9 01/05/2024    BUN 9.6 08/10/2023    CR 0.84 01/05/2024    CR 0.79 08/10/2023     (H) 04/18/2024     (H) 01/05/2024     COAGS:   Lab Results   Component Value Date    PTT 63 (H) 07/16/2022    INR 1.23 (H) 01/05/2024     POC:   Lab Results   Component Value Date     (H) 11/11/2020     HEPATIC:   Lab Results   Component Value Date    ALBUMIN 4.5 01/05/2024    PROTTOTAL 8.3 01/05/2024    ALT 15 01/05/2024    AST  19 01/05/2024    ALKPHOS 122 01/05/2024    BILITOTAL 0.6 01/05/2024     OTHER:   Lab Results   Component Value Date    LACT 1.0 07/16/2022    A1C 5.9 (H) 01/09/2024    A[place] 9.9 01/05/2024    PHOS 2.9 05/21/2013    MAG 1.9 07/11/2015    LIPASE 134 11/23/2020    TSH 1.36 10/09/2018    CRP 6.4 08/10/2015    SED 15 07/16/2022       Anesthesia Plan    ASA Status:  3    NPO Status:  NPO Appropriate    Anesthesia Type: General.     - Airway: ETT   Induction: Propofol, Intravenous, RSI.   Maintenance: Balanced.        Consents    Anesthesia Plan(s) and associated risks, benefits, and realistic alternatives discussed. Questions answered and patient/representative(s) expressed understanding.     - Discussed: Risks, Benefits and Alternatives for BOTH SEDATION and the PROCEDURE were discussed     - Discussed with:  Patient      - Extended Intubation/Ventilatory Support Discussed: No.      - Patient is DNR/DNI Status: No     Use of blood products discussed: Yes.     - Discussed with: Patient.     - Consented: consented to blood products     Postoperative Care    Pain management: IV analgesics.   PONV prophylaxis: Ondansetron (or other 5HT-3), Dexamethasone or Solumedrol     Comments:                Pepe Cespedes MD

## 2024-04-23 NOTE — OP NOTE
Operative note    Location: Operating room, [facility]    Surgeon: Dr. Blue    Assistant: Dr. Constantino Pettit, vascular surgery resident    Anesthesia: General anesthesia    Anesthesiologist: Staff anesthesiologist    Preop diagnosis:  Right superficial femoral artery stenosis  Peripheral arterial disease  Impending right below-knee amputation  Right foot neuropathic pain  Right foot Achilles tendon nonhealing injury    Postop diagnosis:  Right superficial femoral artery occlusion  Peripheral arterial disease  Impending right below-knee amputation  Right foot neuropathic pain  Right foot Achilles tendon nonhealing injury    Procedure:  Right superficial femoral artery stent placement  Ultrasound guided retrograde left common femoral artery needle access    Access:: Left common femoral artery, retrograde 6 Nicaraguan sheath, ultrasound-guided  Closure: Perclose device    Devices used:  6 mm x 12 cm Roseline drug-eluting stent  Pre and post dilation with a 6 mm x 6 cm balloon angioplasty    Indication: This is a 63-year-old gentleman who I met for the first time in the preop area today.  He was referred to vascular surgery 5 days ago when he was in the preop area due to undergo a right below-knee amputation.  A review by his orthopedic surgeon Dr. Nye, noted an arterial duplex showing right SFA 75% stenosis and we were therefore called to determine if his arterial perfusion was adequately optimized to heal a below-knee amputation.  In order to maximize his arterial perfusion for wound healing of his below-knee amputation stump, I advised that the patient undergo an angiogram with possible endovascular intervention.  In order to expedite this, I arranged for this to be booked on our side of the OR bank today.  Note upon calling the holding area to examine the patient 5 days ago we were informed that the patient had already been discharged and therefore could not be seen by us [place].  My senior resident Dr. Pettit had an extensive discussion over the phone with the patient yesterday who expressed his desire for a below-knee amputation.  I discussed with the patient in the holding area today about his indication for below-knee amputation; the patient expressed that he had had a foot drop for many years subsequent to which he had surgery on his Achilles tendon which over the last 4 years has not healed and effectively left him with a nonfunctional leg.  In addition he experiences severe neuropathic type pain in his right foot that lASTS for multiple hours.  He tells me that he has had multiple opinions on his Achilles tendon including an opinion from the [facility] and he has been told that no further operative intervention on his Achilles tendon can be performed..  I directly asked the patient that if he had normal arterial perfusion to the right lower extremity would he still be proceeding with an amputation and the patient was firm and his response that this is his desired course of action.  He tells me that he has had a discussion with Dr. Nye and understands that if his below-knee stump does not heal he will end up with an above-knee amputation.  Naturally from a limb salvage perspective I closely questioned the patient in regards to the above to ensure that there was no aspect of PAD and nonhealing that could be contributing to his desire for a below-knee amputation.  He informed me that even if I were to completely revascularize him he would still desire a below-knee amputation.  I pursued this line of questioning to determine if I would pursue any tibial intervention should this arise during angiographic evaluation.  Given his desire for BKA my intention therefore was to treat his SFA intervention solely even if any tibial disease was encountered.  Informed consent was obtained for the patient with risk discussed including bleeding, hematoma, thrombosis, acutely  ischemic leg and need for any operative intervention particularly for the access site.  In our discussions he mentioned that he would be unable to lie flat due to his back pain for this procedure and therefore needed general anesthesia.    Procedure: The patient was brought to the operating room and placed supine on the table.  Both groins were prepped and draped in sterile fashion.  Timeout was performed with the nursing and anesthetic staff.  General anesthesia and IV antibiotics were administered by the anesthetic staff.  Access was obtained in the left common femoral artery under ultrasound guidance using a micropuncture kit following instillation of 1% lidocaine.  The micropuncture wire was noted to traverse appropriately and the needle exchanged for the micropuncture sheath.  Hand-held contrast was injected via the micropuncture sheath that confirmed intraluminal placement in the common femoral artery.  [email or web address] wire was then advanced into the aorta and the micropuncture sheath exchanged for a short 5 Belarusian sheath.  Omni Flush catheter was placed in the infrarenal aorta and hand-held contrast injected for delineation of the aortic bifurcation.  This was noted with no stenosis in the bilateral common and external iliac arteries.  A Glidewire advantage wire was then used to go up another and parked the sheath in the right common femoral artery.  From here hand-held contrast was injected for segmental radiological evaluation of the right lower extremity.  This showed essentially an occlusion of the right distal superficial femoral artery approximately 6 to 8 cm in length.  The popliteal artery was patent along with the tibial vessels with no disease noted and flow noted into the foot via the PT and DP.  A 70 cm 6 Belarusian sheath was then placed over the wire however this was in the profunda femoris artery.  The sheath was then pulled back and an angled catheter used to passage  the stiff angled Glidewire into the  proximal superficial femoral artery.  The sheath over the dilator was then advanced into the proximal superficial femoral artery.  From here hand-held contrast was then injected again to delineate the area of occlusion.  Gllow and tell tape was placed over the distal thigh.  The area of occlusion was noted to be between 26 and 2 cm on the marking )  5000 units of intravenous heparin was administered by anesthesia under our direction (50 units/kg approximately).  Using a Glidewire advantage and angled Israel cross catheter after multiple attempts the area of occlusion was traversed and placed  the catheter back in the native lumen following removal of the wire blood was easily aspirated.  Hand-held contrast was injected and confirmed we were back in the true lumen.  The Glidewire advantage was then placed into the anterior tibial artery.  The area of occlusion was predilated with a 6 mm balloon.  Following this a single stent was deployed followed by postdilatation of the entire length including the proximal and distal sites overlapping the native artery.  Hand-held contrast was then injected from the sheath that revealed patency of the prior area of occlusion followed by flow into the popliteal artery and maintained flow in the tibial arteries down into the foot.  Sheath was pulled back into the right common femoral artery from which hand-held contrast was injected that showed maintained patency in the right profunda femoris artery.  The sheath was then pulled back into the left common iliac artery and removed over the wire.  Following this the Perclose device was deployed without event in the left groin.  No hematoma nor bleeding was noted from the left groin.  Glue was placed for skin closure .The patient had a palpable bilateral DP pulse at the conclusion of the case.  The ACT was checked after multiple samples and by the end of the case the ACT was noted to be in the 190s. (Prior samples near the beginning of the  case had technical machine issues).  The patient was subsequently extubated and transferred to the recovery room in stable condition

## 2024-04-23 NOTE — PROGRESS NOTES
Vascular Surgery Note    Mr. Erwin Aguilar is referred to us for evaluation of right lower extremity peripheral arterial disease. He was scheduled for right below-knee amputation with Dr. Nye last week when Dr. Yoon called me to ask for vascular surgery evaluation.    I called Erwin yesterday and met him in person today in pre-op with staff, Dr. Blue, where we again discussed his situation.    He has a history of right lower extremity chronic neurogenic pain as a result of previous spine operation leading to right foot drop. He developed calcaneal tendonitis and underwent partial calcaneous excision in 2020. Since then the wound has failed to completely heel. The wound has nearly heeled several times only to later devolve into a larger wound. He notes occasional brown drainage.    He has chronic pain in the right groin, testicles, buttock, forefoot, and heel. Forefoot pain is described as 10/10 electric shock sensation, present every day.    He has discussed plans for below-knee amputation with Parris and understands that he may have residual phantom limb pain, and that below-knee (and even above-knee) amputation would not improve his more proximal right leg chronic neurogenic pain.    We reviewed his noninvasive studies that demonstrate an LIZZY of 0.53, toe pressure of 16 mmHg and evidence of severe stenosis of the distal right SFA on duplex. Profunda femoris artery is widely patent.    On exam he has palpable bilateral femoral pulses, palpable left DP and PT pulses, and monophasic doppler signals over the right DP and PT doppler signals. There is approx 2x4 cm epithelialized wound over the right achiles.      There is concern for poor wound healing ability with his high-grade right SFA stenosis.    We discussed the option of a limb salvage approach however he is steadfast in his desire to proceed with below-knee amputation with hopes of improving his chronic right foot pain. He understands that even with  successful revascularization he may still experience wound complications with below-knee amputation.  Risks and benefits of lower extremity angiography and balloon angioplasty and possible stenting were discussed. Informed consent was obtained and he wishes to proceed.    Patient was seen and discussed with staff, Dr. Blue.    Constantino Pettit MD

## 2024-04-23 NOTE — PROVIDER NOTIFICATION
Texted on Shannan Pettit: Are you able to place an admit order in now so we can start working on a bed for this pt postop? He is listed as a same day but plan is to admit him overnight. Thanks Pao [phone]

## 2024-04-23 NOTE — BRIEF OP NOTE
[facility]    Brief Operative Note    Pre-operative diagnosis: PAD (peripheral artery disease) (H24) [I73.9]  Post-operative diagnosis Same as pre-operative diagnosis    Procedure:  1. Ultrasound guided percutaneous transarterial left common femoral artery access  2. Diagnostic aortoiliac angiogram  3. Selective cannulation of right comon iliac, right external iliac, right common femoral, and right superficial femoral artery  4. Balloon angioplasty of right superficial femoral artery  5. 6mm x 12 cm self-expanding drug-coated stent placement of right superficial femoral artery  6. left common femoral arteriotomy closure with Perclose device    Surgeon: Surgeons and Role:     * Quique Blue MD - Primary     * Constantino Pettit MD - Fellow - Assisting  Anesthesia: MAC   Estimated Blood Loss: 20 ml    Drains: None  Specimens: * No specimens in log *  Findings: Chronic right distal SFA occlusion, recannalized and stented with 6 mm x 12 cm self expanding drug-coated stent  Palpable right DP pulse and left DP pulse at case completion    Contrast 84 ml  Fluoro dose: 812 mGy  Fluoro time: 24.5 min    Complications: None.  Implants:   Implant Name Type Inv. Item Serial No.  Lot No. LRB No. Used Action   STENT VASC NELLIE L120 MM L130 CM OD6 MM DE [id number] - [id number] Stent STENT VASC NELLIE L120 MM L130 CM OD6 MM DE [id number] [employer] [id number] Right 1 Implanted     Plan:  - bed rest x 2 hr with HOB < 30 deg  - ADAT after bedrest  - admit for obs overnight due to no ride available today  - site and pulse checks  - aspirin, resume Eliquis tomorrow

## 2024-04-24 VITALS
TEMPERATURE: 99.3 F | DIASTOLIC BLOOD PRESSURE: 53 MMHG | OXYGEN SATURATION: 99 % | BODY MASS INDEX: 29.76 KG/M2 | WEIGHT: 207.89 LBS | HEIGHT: 70 IN | HEART RATE: 64 BPM | RESPIRATION RATE: 18 BRPM | SYSTOLIC BLOOD PRESSURE: 150 MMHG

## 2024-04-24 DIAGNOSIS — I73.9 PAD (PERIPHERAL ARTERY DISEASE) (H): Primary | ICD-10-CM

## 2024-04-24 LAB
GLUCOSE BLDC GLUCOMTR-MCNC: 113 MG/DL (ref 70–99)
GLUCOSE BLDC GLUCOMTR-MCNC: 88 MG/DL (ref 70–99)

## 2024-04-24 PROCEDURE — 250N000013 HC RX MED GY IP 250 OP 250 PS 637: Performed by: STUDENT IN AN ORGANIZED HEALTH CARE EDUCATION/TRAINING PROGRAM

## 2024-04-24 PROCEDURE — 250N000013 HC RX MED GY IP 250 OP 250 PS 637: Performed by: SURGERY

## 2024-04-24 PROCEDURE — 258N000003 HC RX IP 258 OP 636: Performed by: STUDENT IN AN ORGANIZED HEALTH CARE EDUCATION/TRAINING PROGRAM

## 2024-04-24 RX ORDER — GABAPENTIN 300 MG/1
CAPSULE ORAL 2 TIMES DAILY
Status: ON HOLD | COMMUNITY
End: 2024-05-09

## 2024-04-24 RX ORDER — ROSUVASTATIN CALCIUM 20 MG/1
40 TABLET, COATED ORAL DAILY
COMMUNITY
End: 2024-06-11

## 2024-04-24 RX ORDER — ROSUVASTATIN CALCIUM 20 MG/1
20 TABLET, COATED ORAL AT BEDTIME
Status: DISCONTINUED | OUTPATIENT
Start: 2024-04-24 | End: 2024-04-24 | Stop reason: HOSPADM

## 2024-04-24 RX ORDER — ASPIRIN 81 MG/1
81 TABLET ORAL DAILY
Qty: 90 TABLET | Refills: 3 | Status: ON HOLD | OUTPATIENT
Start: 2024-04-25 | End: 2024-05-10

## 2024-04-24 RX ORDER — TRIAMTERENE/HYDROCHLOROTHIAZID 37.5-25 MG
1 TABLET ORAL DAILY
Status: DISCONTINUED | OUTPATIENT
Start: 2024-04-24 | End: 2024-04-24 | Stop reason: HOSPADM

## 2024-04-24 RX ORDER — AMOXICILLIN 250 MG
1 CAPSULE ORAL 2 TIMES DAILY PRN
Qty: 14 TABLET | Refills: 0 | Status: SHIPPED | OUTPATIENT
Start: 2024-04-24 | End: 2024-05-01

## 2024-04-24 RX ORDER — POLYETHYLENE GLYCOL 3350 17 G/17G
17 POWDER, FOR SOLUTION ORAL DAILY
Qty: 119 G | Refills: 0 | Status: SHIPPED | OUTPATIENT
Start: 2024-04-24 | End: 2024-05-01

## 2024-04-24 RX ADMIN — SODIUM CHLORIDE, PRESERVATIVE FREE: 5 INJECTION INTRAVENOUS at 00:07

## 2024-04-24 RX ADMIN — APIXABAN 5 MG: 5 TABLET, FILM COATED ORAL at 09:32

## 2024-04-24 RX ADMIN — DOCUSATE SODIUM 50 MG AND SENNOSIDES 8.6 MG 1 TABLET: 8.6; 5 TABLET, FILM COATED ORAL at 07:40

## 2024-04-24 RX ADMIN — SERTRALINE HYDROCHLORIDE 200 MG: 100 TABLET, FILM COATED ORAL at 07:38

## 2024-04-24 RX ADMIN — GABAPENTIN 300 MG: 300 CAPSULE ORAL at 07:38

## 2024-04-24 RX ADMIN — ASPIRIN 81 MG: 81 TABLET ORAL at 07:38

## 2024-04-24 RX ADMIN — PROPRANOLOL HYDROCHLORIDE 40 MG: 10 TABLET ORAL at 07:38

## 2024-04-24 RX ADMIN — BACLOFEN 20 MG: 10 TABLET ORAL at 07:38

## 2024-04-24 RX ADMIN — TRIAMTERENE AND HYDROCHLOROTHIAZIDE 1 TABLET: 37.5; 25 TABLET ORAL at 09:32

## 2024-04-24 RX ADMIN — POLYETHYLENE GLYCOL 3350 17 G: 17 POWDER, FOR SOLUTION ORAL at 07:40

## 2024-04-24 ASSESSMENT — ACTIVITIES OF DAILY LIVING (ADL)
ADLS_ACUITY_SCORE: 32
ADLS_ACUITY_SCORE: 25
ADLS_ACUITY_SCORE: 32
ADLS_ACUITY_SCORE: 25

## 2024-04-24 NOTE — PROGRESS NOTES
Vascular Surgery Progress Note  04/24/2024       Subjective:  Feeling well this morning, excited to have regained motor function and improved sensation in right foot.      Objective:  Temp:  [97.6  F (36.4  C)-99.3  F (37.4  C)] 99.3  F (37.4  C)  Pulse:  [47-74] 64  Resp:  [18-22] 18  BP: (107-177)/() 150/53  SpO2:  [95 %-100 %] 99 %    I/O last 3 completed shifts:  In: 1675.75 [P.O.:100; I.V.:1575.75]  Out: 1275 [Urine:1275]      Gen: Awake, alert, NAD  CV: RRR per radial pulse  Resp: NLB on room air  Abd: Soft, nondistended, nontender  Incision: groin c/d/I, no hematoma, no swelling  Ext: WWP, mild edema in right foot  Vascular: palpable femoral pulses bilaterally. Palpable DP pulses bilaterally, Doppler signals over PT pulses bilaterally. Motor and sensory function improved from baseline to normal post procedure.      Labs:  Recent Labs   Lab 04/23/24  1155   WBC 7.4   HGB 13.7          Recent Labs   Lab 04/24/24  0728 04/24/24  0304 04/23/24  2250 04/23/24  1258 04/23/24  1155   NA  --   --   --   --  142   POTASSIUM  --   --   --   --  3.7   CHLORIDE  --   --   --   --  108*   CO2  --   --   --   --  24   BUN  --   --   --   --  11.5   CR  --   --   --   --  0.67   * 88 90   < > 72   [place]  --   --   --   --  8.9    < > = values in this interval not displayed.        Assessment/Plan:   63 year old male with known PAD (right SFA 75% stenosis), right foot neuropathic pain and non-healing Achilles tendon injury, now s/p angiogram with right SFA stenting, recovering well. He reports this morning improved sensation in right foot, has palpable DP and doppler PT, reports significantly improved dorsiflexion and plantarflexion of right foot.     - Continue with ASA, statin and apixaban  - Home medications have been resumed  - Plan to discharge to home this morning. We will schedule follow up with arterial duplex and ABIs in vascular surgery clinic.    Discussed with vascular surgery staff, who is in  agreement with the above.    Alejandra Doran, [facility]  Vascular Surgery  Pager: [phone]  [email or web address]  Send message or 10 digit call back number Securely via [employer] with the [employer] Web Console (learn more here)

## 2024-04-24 NOTE — PROGRESS NOTES
Admitted/transferred from: PACU @2519  2 RN full   skin assessment completed by Doc Sky, RODERICK and RODERICK Daniels.  Skin assessment finding: issues found L femoral incision, CYRIL. R toe scab/swelling.     Interventions/actions: skin interventions none

## 2024-04-24 NOTE — PLAN OF CARE
Vitals:    04/24/24 0400 04/24/24 0500 04/24/24 0600 04/24/24 0730   BP: 107/46 (!) 149/56 (!) 158/74 (!) 150/53   BP Location:    Left arm   Cuff Size:       Pulse:    64   Resp:    18   Temp:    99.3  F (37.4  C)   TempSrc:    Oral   SpO2: 96% 97% 97% 99%   Weight:       Height:           Neuro: alert and oriented    VS:T-max 99.3 slight hypertensive, sating 99% non labor breathing on room air     BG:  with sliding scale, below the parameter.    Cardiac: 64    Pain/Nausea: denies any nausea, denies any pain     Lines/Drains: PIV SL     Incision/Wound: left femoral puncture site derma bond intact, cms intact + pulse    GI/: voiding adequate, +BS    Diet/Appetite: tolerating intake     Activity: up ambulated     Plan: discharge script written, teaching has been done, a copy  of discharge given to pt   Pt discharged home

## 2024-04-24 NOTE — UTILIZATION REVIEW
Admission Status; Secondary Review Determination   Admission Date: 4/23/2024  Under the authority of the Utilization Management Committee, the utilization review process indicated a secondary review on the above patient. The review outcome is based on review of the medical records, discussions with staff, and applying clinical experience noted on the date of the review.   (x) Outpatient Status Appropriate - This patient does not meet hospital inpatient criteria. If this patient's primary payer is Medicare and was admitted as an inpatient, Condition Code 44 should be used and patient status changed to outpatient recovery.    RATIONALE FOR DETERMINATION   Erwin Aguilar is a 64 yo male with a medical history of  PAD (right SFA 75% stenosis), right foot neuropathic pain and non-healing Achilles tendon injury who underwent angiogram with right SFA stenting on 4/23/2024.  The procedure went well and he was admitted for postoperative monitoring and symptom management.  Patient has Medicare and the procedure is not on the inpatient list.  He is recovering well and discharge later today is anticipated.  Outpatient overnight status is appropriate.  I have communicated with the care team.  The severity of illness, intensity of service provided, expected LOS and risk for adverse outcome doesn't meet inpatient hospital admission.   The information on this document is developed by the utilization review team in order for the business office to ensure compliance. This only denotes the appropriateness of proper admission status and does not reflect the quality of care rendered.   The definitions of Inpatient Status and Observation Status used in making the determination above are those provided in the CMS Coverage Manual, Chapter 1 and Chapter 6, section 70.4.   Sincerely,   Dalia Miller MD  Physician Advisor    Utilization Management

## 2024-04-24 NOTE — PROGRESS NOTES
"  Surgery Cross-Cover  Post Op Check    04/24/2024    Erwin Aguilar is a 63 year old male POD#0 s/p Procedure(s):  Ultrasound guided percutaneous transarterial left common femoral artery access, diagnostic aortoiliac angiogram, selective cannulation of right comon iliac, righ external iliac, right common femoral, and right superficial femoral artery, balloon angioplasty of right superficial femoral artery, 6mm x 12 cm self-expanding drug-coated stent placement of right superficial femoral artery, left common femoral arteriotomy closure with Perclose device for Pre-Op Diagnosis Codes:     * PAD (peripheral artery disease) (H24) [I73.9]    Pt reports their pain is controlled with current regimen. Denies nausea, SOB, chest pain, or dizziness. Feels that his R foot is warm, which he reports is unusual for him. No numbness or tingling in extremities although patient reports he has decreased sensation in his lower extremities at baseline.    BP (!) 112/39   Pulse 74   Temp 98.8  F (37.1  C) (Oral)   Resp 20   Ht 1.778 m (5' 10\")   Wt 94.3 kg (207 lb 14.3 oz)   SpO2 95%   BMI 29.83 kg/m      Gen: A&O x4, NAD   Chest: breathing non-labored on room air  Abdomen: non-distended  Incision: Access site in L groin dry with no detectable hematoma.  Extremities: Lower extremities both warm and pinkish in color. Strong DP pulse palpated on L but not convincingly palpated on R. Clear pulses noted on Doppler bilaterally. Very limited motor in R foot, which pt reports is his baseline.      A/P: No acute post-op issues. Will recheck pulses again before morning. Discussed with senior resident and vascular fellow.  Continue current plan of care. Please call with any questions.    Fredy Calhoun MD  PGY1      Addendum:  Rechecked pt at about 300. Blood pressure was higher then, and DP pulses were easily palpated bilaterally.  "

## 2024-04-24 NOTE — ANESTHESIA POSTPROCEDURE EVALUATION
Patient: Erwin Aguilar    Procedure: Procedure(s):  Ultrasound guided percutaneous transarterial left common femoral artery access, diagnostic aortoiliac angiogram, selective cannulation of right comon iliac, righ external iliac, right common femoral, and right superficial femoral artery, balloon angioplasty of right superficial femoral artery, 6mm x 12 cm self-expanding drug-coated stent placement of right superficial femoral artery, left common femoral arteriotomy closure with Perclose device       Anesthesia Type:  General    Note:  Disposition: Inpatient   Postop Pain Control: Challenging            Challenges/Interventions: Exacerbation of chronic pain            Sign Out: Well controlled pain   PONV: No   Neuro/Psych: Uneventful            Sign Out: Acceptable/Baseline neuro status   Airway/Respiratory: Uneventful            Sign Out: Acceptable/Baseline resp. status   CV/Hemodynamics: Uneventful            Sign Out: Acceptable CV status; No obvious hypovolemia; No obvious fluid overload   Other NRE:    DID A NON-ROUTINE EVENT OCCUR? No    Event details/Postop Comments:  Chronic back pain made flat time a challenge. Improved somewhat with medication and then when able to move.            Last vitals:  Vitals Value Taken Time   /61 04/23/24 2010   Temp 36.7  C (98  F) 04/23/24 2010   Pulse 57 04/23/24 1850   Resp 22 04/23/24 1700   SpO2 100 % 04/23/24 2010   Vitals shown include unfiled device data.    Electronically Signed By: Omega Zarco MD  April 24, 2024  6:42 AM

## 2024-04-24 NOTE — PHARMACY-ADMISSION MEDICATION HISTORY
Pharmacist Admission Medication History    Admission medication history is complete. The information provided in this note is only as accurate as the sources available at the time of the update.    Information Source(s): Patient, Rosina/WILL [place] PDMP via in-person    Pertinent Information:   Patient has two oxycodone prescriptions - one written for 5 mg four times daily as needed and the other written for 10 mg daily as needed in addition to the other prescription. Patient reports they use them as 10 mg (either 2 tablets of the 5 mg or 1 tablet of the 10 mg) as needed in the AM, afternoon, and evening.   Patient reported that his rosuvastatin has been increased to 2 tablets of the 20 mg - 40 mg - daily. Most recent fill in [employer] is for 20 mg daily. Patient reported that they are finishing the supply they have and then their physician will be sending in a prescription for 40 mg daily.   Patient was a good historian of their medications and denied the use of any other prescription or over the counter medications.     Changes made to PTA medication list:  Added: None  Deleted: None  Changed:   Gabapentin 300 mg capsule - take 1 capsule by mouth in the afternoon and 2 capsules at bedtime --> take 600 mg by mouth in the afternoon and 300 mg by mouth at bedtime (per patient, switched order of doses because the bedtime dose was not kicking in soon enough)  Rosuvastatin 20 mg tablet - take 1 tablet (20 mg) by mouth daily --> take 40 mg by mouth daily (per patient, increased to 40 mg daily by provider, provider will send updated prescription once patient out of current supply)     Allergies reviewed with patient and updates made in EHR: yes    Medication History Completed By: Rishabh Flores RPH 4/24/2024 11:44 AM    PTA Med List   Medication Sig Last Dose    albuterol (PROAIR HFA/PROVENTIL HFA/VENTOLIN HFA) 108 (90 Base) MCG/ACT inhaler INHALE 2 PUFFS BY MOUTH EVERY 6 HOURS AS NEEDED FOR SHORTNESS OF  BREATH OR WHEEZING Past Week    apixaban ANTICOAGULANT (ELIQUIS ANTICOAGULANT) 5 MG tablet Take 1 tablet (5 mg) by mouth 2 times daily 4/19/2024    [START ON 4/25/2024] aspirin 81 MG EC tablet Take 1 tablet (81 mg) by mouth daily for 360 days     baclofen (LIORESAL) 10 MG tablet TAKE 2 TABLETS BY MOUTH 3 TIMES A DAY 4/22/2024    doxazosin (CARDURA) 8 MG tablet Take 1 tablet (8 mg) by mouth at bedtime 4/22/2024 at 0300    gabapentin (NEURONTIN) 300 MG capsule Take 600 mg by mouth in the afternoon and 300 mg at bedtime 4/22/2024 at 1900    insulin glargine (LANTUS SOLOSTAR) 100 UNIT/ML pen INJECT 26 UNITS SUBCUTANEOUSLY AT BEDTIME 4/22/2024 at 1900    medical cannabis (Patient's own supply) See Admin Instructions (The purpose of this order is to document that the patient reports taking medical cannabis.  This is not a prescription, and is not used to certify that the patient has a qualifying medical condition.)   Flower - PRN Past Month    oxyCODONE (ROXICODONE) 5 MG tablet Take 1 tablet (5 mg) by mouth 4 times daily as needed for severe pain #120/month, visit with Dr. Wegener every 3 months. 4/23/2024 at 0730    oxyCODONE IR (ROXICODONE) 10 MG tablet Take 1 tablet (10 mg) by mouth daily as needed for severe pain In addition to 5 mg four times daily dosing. Past Month    promethazine (PHENERGAN) 25 MG tablet TAKE 1 TABLET BY MOUTH 3 TIMES A DAY AS NEEDED FOR NAUSEA More than a month    propranolol (INDERAL) 40 MG tablet Take 1 tablet (40 mg) by mouth 3 times daily 4/23/2024 at 0800    rosuvastatin (CRESTOR) 20 MG tablet Take 40 mg by mouth daily 4/22/2024    sertraline (ZOLOFT) 100 MG tablet TAKE 2 TABLETS BY MOUTH DAILY 4/22/2024    triamterene-HCTZ (DYAZIDE) 37.5-25 MG capsule Take 1 capsule by mouth every morning 4/22/2024

## 2024-04-24 NOTE — DISCHARGE SUMMARY
Vascular Surgery Discharge Summary    NAME: Erwin Aguilar   MRN: [id number]   : 1960     DATE OF ADMISSION: 2024     PRE/POSTOPERATIVE DIAGNOSES:    Right superficial femoral artery stenosis  Peripheral arterial disease  Impending right below-knee amputation  Right foot neuropathic pain  Right foot Achilles tendon nonhealing injury    PROCEDURES PERFORMED, 2024:   Procedure:  Right superficial femoral artery stent placement  Ultrasound guided retrograde left common femoral artery needle access     Access:: Left common femoral artery, retrograde 6 Estonian sheath, ultrasound-guided  Closure: Perclose device    INTRAOPERATIVE FINDINGS:  Chronic right distal SFA occlusion, recannalized and stented with 6 mm x 12 cm self expanding drug-coated stent  Palpable right DP pulse and left DP pulse at case completion    POSTOPERATIVE COMPLICATIONS: None    DATE OF DISCHARGE: 24    HOSPITAL COURSE: Erwin Aguilar is a 63 year old male who on 2024 underwent the above-named procedures. He tolerated the procedure well and postoperatively was transferred to the general post-surgical unit. The remainder of his course was essentiallly uncomplicated. Prior to discharge, his pain was controlled well with oxycodone.  He was able to perform ADLs and ambulate independently without difficulty, and had full return of bowel and bladder function.  On 24, he was discharged to home in stable condition.    Physical Exam:  Constitutional: healthy, alert, no acute distress and cooperative   Cardiovascular: pulses regular  Respiratory: breathing unlabored without secondary muscle use  Psychiatric: mentation appears normal and affect normal/bright  Neck: no asymmetry  GI/Abdomen: abdomen soft, non-tender. No palpable masses  MSK: able to move all extremities without weakness or ataxia  Extremities: no open lesions, extremities warm and well perfused  Hematology: no bruising on visible skin  Pulses: symmetric and  palpable radial, DP bilaterally    DISCHARGE INSTRUCTIONS:  Diet  - You may return to your regular diet. We always encourage taking fiber as well as staying well-hydrated.   - Be sure to drink plenty of fluids.      Activity  - No lifting, pushing, or pulling greater than 10 pounds and no strenuous exercise for 4-6 weeks.   - We encourage throat lozenges or cough drops if you develop a cough.  - No driving while on narcotic pain medications (such as oxycodone)  - No driving until you are able to fully twist to both sides quickly and without any pain     Wound Care  - Inspect your wounds daily for signs of infection (increased redness, drainage, pain)  - Keep your wound clean and dry, inspect it daily for the above signs.  - You may shower 24 hours after your surgery, but do not soak incisions in tubs, pools, lakes for at least 14 days post-surgery. When showering, please do not scrub your incisions - let soapy water run over them, pat to dry.     Call Vascular surgery [facility] for:  - Temperature > 101F, chills, rigors, dizziness  - Redness around or purulent drainage from wound  - Inability to tolerate diet, nausea or vomiting  - You stop passing gas, develop significant bloating, abdominal pain  - Have blood in stools/vomit  - Have severe diarrhea/constipation  - Any other questions or concerns.     Medication Instructions  Some of your medications may have changed. Please take only prescribed and resumed medications.      We recommend you take Tylenol around the clock for baseline pain control (this was prescribed for you). Then take narcotic pain medication only as needed if you were prescribed any. Once you are no longer needing narcotics, you may take the Tylenol as needed. Do not take more than 4,000 mg of acetaminophen (Tylenol) from all sources to reduce the risk of liver damage.      In addition to Apixaban/Eliquis, you were prescribed Aspirin. Please take as instructed and be careful of injuries. This  predisposes you to high risk of bleeding, however it is needed for your recent femoral artery stent. Please avoid injuries, accidents. Monitor for gum bleeding, stroke-like symptoms or bloody, dark tarry stools. Should you have any symptoms, please seek care immediately and notify our team.      We also prescribed stool softeners, please take only if constipated.      Follow up:     PCP:  Follow up with PCP in 5-7 days for post-hospitalization follow up. You may call to set this up - most clinics will be able to get you an appointment within the week if you let them know you were recently hospitalized and need to see your PCP.      Vascular:  Future Appointments 4/24/2024 - 10/21/2024          Date Visit Type Length Department Provider       5/3/2024 10:30 AM US LIZZY DOPPLER NO EXERCISE 40 min [facility]TR[email or web address]     Location Instructions:      The [facility] ([facility] a dense urban area with multiple transportation and parking options. You may wish to review options for  service and self-parking in more detail on the [facility] website at [email or web address].&nbsp;                         5/3/2024 11:10 AM US LWR EXT ART DUP BILAT 60 min [facility]TR[facility]OUND [email or web address]     Location Instructions:      The [facility] ([facility] is in a dense urban area with multiple transportation and parking options. You may wish to review options for  service and self-parking in more detail on the [facility] website at [email or web address].&nbsp;                         5/6/2024 12:30 PM RETURN VASCULAR PATIENT 30 min [facility] VASCULAR SURGERY Alejandra Doran APRN CNP     Location Instructions:      The [facility] ([facility] is in a dense urban area with multiple transportation and parking options. You may wish to review options for  service and self-parking in more detail on the [facility] website at [email or web address].&nbsp;                         6/4/2024  8:20 AM RETURN  CARDIOLOGY 30 min [facility] HEART CARE Dawit Stern MD     Location Instructions:      [address] [facility] [address]                       8/5/2024  7:15 AM LAB 15 min [facility]  LAB     Location Instructions:      The [facility] [place] a dense urban area with multiple transportation and parking options. You may wish to review options for  service and self-parking in more detail on the [facility] website at [email or web address]nbsp;                        8/5/2024  8:15 AM RETURN LIVER 45 min  HEPATOLOGY Miguel A Thomas PA-C     Location Instructions:       The [facility] ([facility] a dense urban area with multiple transportation and parking options. You may wish to review options for  service and self-parking in more detail on the [facility] website at [email or web address].                                 With general vascular surgery questions, concerns, or to request/change an appointment, please call:        Vascular Call Center  [phone]     To contact someone after 5 pm, on a weekend, or on a Holiday, please call:  [facility]  [phone], option 4 to have the Attending Physician for Vascular Surgery Service paged.     DISCHARGE MEDICATIONS:     Review of your medicines        START taking        Dose / Directions   aspirin 81 MG EC tablet      Dose: 81 mg  Start taking on: April 25, 2024  Take 1 tablet (81 mg) by mouth daily for 360 days  Quantity: 90 tablet  Refills: 3     polyethylene glycol 17 GM/Dose powder  Commonly known as: MIRALAX      Dose: 17 g  Take 17 g by mouth daily for 7 days  Quantity: 119 g  Refills: 0     senna-docusate 8.6-50 MG tablet  Commonly known as: SENOKOT-S/PERICOLACE      Dose: 1 tablet  Take 1 tablet by mouth 2 times daily as needed for constipation  Quantity: 14 tablet  Refills: 0            CONTINUE these medicines which may have CHANGED, or have  new prescriptions. If we are uncertain of the size of tablets/capsules you have at home, strength may be listed as something that might have changed.        Dose / Directions   Lantus SoloStar 100 UNIT/ML soln  This may have changed:   how much to take  how to take this  when to take this  Used for: Type 2 diabetes mellitus with diabetic polyneuropathy, with long-term current use of insulin (H)  Generic drug: insulin glargine      INJECT 26 UNITS SUBCUTANEOUSLY AT BEDTIME  Quantity: 15 mL  Refills: 3     rosuvastatin 20 MG tablet  Commonly known as: CRESTOR  This may have changed: when to take this  Used for: Hyperlipidemia LDL goal <100      Dose: 20 mg  Take 1 tablet (20 mg) by mouth daily  Quantity: 90 tablet  Refills: 3     sertraline 100 MG tablet  Commonly known as: ZOLOFT  This may have changed:   how much to take  how to take this  when to take this  Used for: GENERALIZED ANXIETY DIS      TAKE 2 TABLETS BY MOUTH DAILY  Quantity: 180 tablet  Refills: 3            CONTINUE these medicines which have NOT CHANGED        Dose / Directions   ACE/ARB/ARNI NOT PRESCRIBED  Commonly known as: INTENTIONAL      ACE & ARB not prescribed due to Allergy  Refills: 0     albuterol 108 (90 Base) MCG/ACT inhaler  Commonly known as: PROAIR HFA/PROVENTIL HFA/VENTOLIN HFA  Used for: Moderate persistent asthma without complication      INHALE 2 PUFFS BY MOUTH EVERY 6 HOURS AS NEEDED FOR SHORTNESS OF BREATH OR WHEEZING  Quantity: 18 g  Refills: 1     apixaban ANTICOAGULANT 5 MG tablet  Commonly known as: ELIQUIS ANTICOAGULANT  Used for: Paroxysmal atrial fibrillation (H)      Dose: 5 mg  Take 1 tablet (5 mg) by mouth 2 times daily  Quantity: 180 tablet  Refills: 3     baclofen 10 MG tablet  Commonly known as: LIORESAL  Used for: Muscle spasms of both lower extremities, Back muscle spasm      Dose: 20 mg  TAKE 2 TABLETS BY MOUTH 3 TIMES A DAY  Quantity: 360 tablet  Refills: 3     doxazosin 8 MG tablet  Commonly known as:  CARDURA  Used for: Hypertension goal BP (blood pressure) < 140/90      Dose: 8 mg  Take 1 tablet (8 mg) by mouth at bedtime  Quantity: 90 tablet  Refills: 1     FreeStyle Bernarda 2 [employer] Cher  Used for: Type 2 diabetes mellitus with diabetic polyneuropathy, with long-term current use of insulin (H)      USE TO READ BLOOD SUGARS AS PER 'S INSTRUCTIONS  Quantity: 1 each  Refills: 0     gabapentin 300 MG capsule  Commonly known as: NEURONTIN  Used for: Lumbosacral radiculopathy at S1      TAKE 1 CAPSULE BY MOUTH IN THE AFTERNOON AND 2 CAPSULES AT BEDTIME  Quantity: 90 capsule  Refills: 1     Global Ease Inject Pen Needles 32G X 4 MM miscellaneous  Used for: Type 2 diabetes mellitus without complication, with long-term current use of insulin (H)  Generic drug: insulin pen needle      USE AS DIRECTED EVERY DAY  Quantity: 100 each  Refills: 3     medical cannabis (Patient's own supply)      See Admin Instructions (The purpose of this order is to document that the patient reports taking medical cannabis.  This is not a prescription, and is not used to certify that the patient has a qualifying medical condition.)   Flower - PRN  Refills: 0     * oxyCODONE 5 MG tablet  Commonly known as: ROXICODONE  Used for: Chronic pain syndrome, H/O foot surgery, Intractable right heel pain, Rupture of right Achilles tendon, initial encounter, Acquired calcaneus deformity of right ankle, Chronic pain of right ankle, Calcific Achilles tendinitis      Dose: 5 mg  Take 1 tablet (5 mg) by mouth 4 times daily as needed for severe pain #120/month, visit with Dr. Wegener every 3 months.  Quantity: 120 tablet  Refills: 0     * oxyCODONE IR 10 MG tablet  Commonly known as: ROXICODONE  Used for: Chronic pain syndrome, H/O foot surgery, Intractable right heel pain, Rupture of right Achilles tendon, initial encounter, Acquired calcaneus deformity of right ankle, Chronic pain of right ankle, Calcific Achilles tendinitis      Dose: 10  mg  Take 1 tablet (10 mg) by mouth daily as needed for severe pain In addition to 5 mg four times daily dosing.  Quantity: 30 tablet  Refills: 0     promethazine 25 MG tablet  Commonly known as: PHENERGAN  Used for: Nausea without vomiting, Gastroparesis      TAKE 1 TABLET BY MOUTH 3 TIMES A DAY AS NEEDED FOR NAUSEA  Quantity: 270 tablet  Refills: 1     propranolol 40 MG tablet  Commonly known as: INDERAL  Used for: Hypertension goal BP (blood pressure) < 140/90      Dose: 40 mg  Take 1 tablet (40 mg) by mouth 3 times daily  Quantity: 270 tablet  Refills: 3     triamterene-HCTZ 37.5-25 MG capsule  Commonly known as: DYAZIDE  Used for: Hypertension goal BP (blood pressure) < 130/80      Dose: 1 capsule  Take 1 capsule by mouth every morning  Quantity: 90 capsule  Refills: 3           * This list has 2 medication(s) that are the same as other medications prescribed for you. Read the directions carefully, and ask your doctor or other care provider to review them with you.                   Where to get your medicines        These medications were sent to [facility] [address] [address]      Phone: [phone]   aspirin 81 MG EC tablet  polyethylene glycol 17 GM/Dose powder  senna-docusate 8.6-50 MG tablet         *MEDICATIONS INTENDED TO BE THE SAME AS PRIOR TO ADMISSION WITH THE EXCEPTION OF ASPIRIN FOR ANTIPLATELET THERAPY FOR YOUR NEW STENT, AND ADDED PAIN MEDICATION AND STOOL SOFTENERS     Alejandra Doran [facility]  Vascular Surgery  Pager: [phone]  [email or web address]  Send message or 10 digit call back number Securely via [employer] with the Vocera Web Console (learn more here)'

## 2024-04-24 NOTE — PLAN OF CARE
"Vital signs:  Temp: 98.5  F (36.9  C) Temp src: Oral BP: (!) 172/76 (per patient baseline) Pulse: 67   Resp: 18 SpO2: 99 % O2 Device: None (Room air)   Height: 177.8 cm (5' 10\") Weight: 94.3 kg (207 lb 14.3 oz)    1867-2455:    Activity: Repositions self in bed.  Neuros: A & O x4. Neuro intact.   Cardiac: BP 100s-170s/30s-90s, HR 50s-70s, asymptomatic, provider aware. No new orders at this time.   Respiratory: LS diminished in bases. O2 sats high 90s on RA. Denies SOB. Unlabored.   GI/: BS+, denies passing flatus, no BM. Voiding adequate in bedside urinal.   Diet: Clear diet.  Skin: Left groin incision dermabonded, CYRIL, no drainage. Pedal pulses 2+, tibial pulses 1+, all dopplerable. Numbness right foot and calf.  Lines: NS infusing at 75 mL/hr via PIV.   Labs: Reviewed. BG 88 at 0300.  Pain/nausea: Denies pain. Denies nausea.   New changes this shift: None.  Plan: Continue POC.     "

## 2024-04-24 NOTE — PROGRESS NOTES
[place]  [facility] Surgery Attending  Pt seen and examined this am.    Right foot feels warm  States now able to move his right foot toes with some foot movement.  Palpablr right DP AND pt.    lEFT GROIN ACCESS SITE: PALPABLE FEM pulse  Left DP palpable.    Pt already on eloquis for hx of afib. Also on statin.  Although I usually start aspirin & plaix post stent placement, in presence of eloquis will only add aspirin as bleed risk becomes excessive with all 3.    If for some reason in futire over the next year, elquis is stopped then would do aspirin and plavix for one year followed by single anti-platelet agent.  Continue statin for stent placement.    I advised patient that stent durability is limited. If delays bka for (eg 1-2 yrs, then may be occluded. Pt is non smoker.  Advised good glycemic control.    F/unit(s) as arranged with vascular    I also advised him that his return of motor function in hsi right foot is not as a result of increased perfusion.

## 2024-04-24 NOTE — DISCHARGE INSTRUCTIONS
Discharge Instructions    Diet  - You may return to your regular diet. We always encourage taking fiber as well as staying well-hydrated.   - Be sure to drink plenty of fluids.     Activity  - No lifting, pushing, or pulling greater than 10 pounds and no strenuous exercise for 4-6 weeks.   - We encourage throat lozenges or cough drops if you develop a cough.  - No driving while on narcotic pain medications (such as oxycodone)  - No driving until you are able to fully twist to both sides quickly and without any pain    Wound Care  - Inspect your wounds daily for signs of infection (increased redness, drainage, pain)  - Keep your wound clean and dry, inspect it daily for the above signs.  - You may shower 24 hours after your surgery, but do not soak incisions in tubs, pools, lakes for at least 14 days post-surgery. When showering, please do not scrub your incisions - let soapy water run over them, pat to dry.    Call Vascular surgery [facility] for:  - Temperature > 101F, chills, rigors, dizziness  - Redness around or purulent drainage from wound  - Inability to tolerate diet, nausea or vomiting  - You stop passing gas, develop significant bloating, abdominal pain  - Have blood in stools/vomit  - Have severe diarrhea/constipation  - Any other questions or concerns.    Medication Instructions  Some of your medications may have changed. Please take only prescribed and resumed medications.     We recommend you take Tylenol around the clock for baseline pain control (this was prescribed for you). Then take narcotic pain medication only as needed if you were prescribed any. Once you are no longer needing narcotics, you may take the Tylenol as needed. Do not take more than 4,000 mg of acetaminophen (Tylenol) from all sources to reduce the risk of liver damage.     In addition to Apixaban/Eliquis, you were prescribed Aspirin. Please take as instructed and be careful of injuries. This predisposes you to high risk of bleeding,  however it is needed for your recent femoral artery stent. Please avoid injuries, accidents. Monitor for gum bleeding, stroke-like symptoms or bloody, dark tarry stools. Should you have any symptoms, please seek care immediately and notify our team.     We also prescribed stool softeners, please take only if constipated.     Follow up:    PCP:  Follow up with PCP in 5-7 days for post-hospitalization follow up. You may call to set this up - most clinics will be able to get you an appointment within the week if you let them know you were recently hospitalized and need to see your PCP.     Vascular:  Future Appointments 4/24/2024 - 10/21/2024        Date Visit Type Length Department Provider     5/3/2024 10:30 AM US LIZZY DOPPLER NO EXERCISE 40 min [id number]    Location Instructions:     The [facility] [place] a [place] area with multiple transportation and parking options. You may wish to review options for  service and self-parking in more detail on the [facility] website at [email or web address]nbsp;                5/3/2024 11:10 AM US LWR EXT ART DUP BILAT 60 min [email or web address]    Location Instructions:     The [facility] [place] a dense urban area with multiple transportation and parking options. You may wish to review options for  service and self-parking in more detail on the [facility] website at [email or web address]nbsp;                5/6/2024 12:30 PM RETURN VASCULAR PATIENT 30 min [facility] Alejandra Doran APRN CNP    Location Instructions:     The [facility] [place] a dense urban area with multiple transportation and parking options. You may wish to review options for  service and self-parking in more detail on the [facility] website at [email or web address]nbsp;                6/4/2024  8:20 AM RETURN CARDIOLOGY 30 min [facility] HEART CARE Dawit Stern MD    Location Instructions:      [address] [facility] [address]              8/5/2024  7:15 AM LAB 15 min [facility]  LAB    Location Instructions:     The [facility] ([facility] is in a dense urban area with multiple transportation and parking options. You may wish to review options for  service and self-parking in more detail on the [facility] website at [email or web address]/[facility].&nbsp;               8/5/2024  8:15 AM RETURN LIVER 45 min  HEPATOLOGY Miguel A Thomas PA-C    Location Instructions:      The [facility] ([facility] is in a dense urban area with multiple transportation and parking options. You may wish to review options for  service and self-parking in more detail on the [facility] website at [email or web address]/[facility].                      With general vascular surgery questions, concerns, or to request/change an appointment, please call:      Vascular Call Center  [phone]    To contact someone after 5 pm, on a weekend, or on a Holiday, please call:  [facility]  [phone], option 4 to have the Attending Physician for Vascular Surgery Service paged.

## 2024-04-25 NOTE — TELEPHONE ENCOUNTER
FUTURE VISIT INFORMATION      SURGERY INFORMATION:  Date: 5/9/24  Location: ur or  Surgeon:  Kurtis Nye MD   Anesthesia Type:  Epidural with Block   Procedure: Right below knee amputation (transtibial amputation)     RECORDS REQUESTED FROM:       Primary Care Provider:   Wegener, Joel Daniel Irwin, MD   - [facility]    Pertinent Medical History: hypertension, paroxysmal atrial fibrillation, pvd, pas    Most recent EKG+ Tracing: 10/19/21    Most recent ECHO: 9/7/21

## 2024-04-26 ENCOUNTER — TELEPHONE (OUTPATIENT)
Dept: VASCULAR SURGERY | Facility: CLINIC | Age: 64
End: 2024-04-26
Payer: MEDICARE

## 2024-04-26 NOTE — TELEPHONE ENCOUNTER
Spoke with pt an answered his questions. He will follow-up with our DELIA as planned on 5/3 and 5/6.    YESI Philippe, RN  RN Care Coordinator  [facility] Vascular Surgery - [facility] phone: [phone]  Fax: [phone]

## 2024-04-26 NOTE — TELEPHONE ENCOUNTER
[facility] Call Center    Phone Message    May a detailed message be left on voicemail: yes     Reason for Call: Other: Pt would like to discuss if his upcoming amputation is okay so soon after his procedure on 4/23, He doesn't know who he is scheduled with and would like a call back to discuss his questions. Please call Pt back at [phone]. Thank you.      Action Taken: Message routed to:  [facility] ([facility] Vascular    Travel Screening: Not Applicable

## 2024-04-26 NOTE — TELEPHONE ENCOUNTER
Attempted to return pt call. Reached his voicemail. LVM w/ callback.    YESI Philippe, RN  RN Care Coordinator  [facility] Vascular Surgery - [facility] phone: [phone]  Fax: [phone]

## 2024-05-01 ENCOUNTER — ANESTHESIA EVENT (OUTPATIENT)
Dept: SURGERY | Facility: CLINIC | Age: 64
DRG: 475 | End: 2024-05-01
Payer: MEDICARE

## 2024-05-01 ENCOUNTER — VIRTUAL VISIT (OUTPATIENT)
Dept: SURGERY | Facility: CLINIC | Age: 64
End: 2024-05-01
Payer: MEDICARE

## 2024-05-01 ENCOUNTER — PRE VISIT (OUTPATIENT)
Dept: SURGERY | Facility: CLINIC | Age: 64
End: 2024-05-01

## 2024-05-01 VITALS — BODY MASS INDEX: 28.35 KG/M2 | WEIGHT: 198 LBS | HEIGHT: 70 IN

## 2024-05-01 DIAGNOSIS — M65.10 ACHILLES TENDON INFECTION: ICD-10-CM

## 2024-05-01 DIAGNOSIS — E11.42 TYPE 2 DIABETES MELLITUS WITH DIABETIC POLYNEUROPATHY, WITH LONG-TERM CURRENT USE OF INSULIN (H): ICD-10-CM

## 2024-05-01 DIAGNOSIS — Z79.4 TYPE 2 DIABETES MELLITUS WITH DIABETIC POLYNEUROPATHY, WITH LONG-TERM CURRENT USE OF INSULIN (H): ICD-10-CM

## 2024-05-01 DIAGNOSIS — Z01.818 PRE-OP EVALUATION: Primary | ICD-10-CM

## 2024-05-01 PROCEDURE — 99215 OFFICE O/P EST HI 40 MIN: CPT | Mod: 95 | Performed by: PHYSICIAN ASSISTANT

## 2024-05-01 ASSESSMENT — COPD QUESTIONNAIRES: COPD: 0

## 2024-05-01 ASSESSMENT — LIFESTYLE VARIABLES: TOBACCO_USE: 1

## 2024-05-01 ASSESSMENT — ENCOUNTER SYMPTOMS
SEIZURES: 0
DYSRHYTHMIAS: 1
ORTHOPNEA: 0

## 2024-05-01 ASSESSMENT — PAIN SCALES - GENERAL: PAINLEVEL: NO PAIN (0)

## 2024-05-01 NOTE — PATIENT INSTRUCTIONS
Preparing for Your Surgery      Name:  Erwin Aguilar   MRN:  [id number]   :  1960   Today's Date:  2024       Arriving for surgery:  Surgery date:  24  Arrival time:  11.20AM    Please come to:     Please come to:       [facility] [address]   [address] [place]  [address]     The Green Ramp for patients and visitors is beneath the [facility]. The parking facility entrance is at the intersection of [address] [place] [address] and [address] Patients and visitors who self-park will receive the reduced hospital parking rate (no ticket validation needed).     [employer] parking, located at the [facility] main entrance on [address] [place] [address], is available Monday - Friday from 7 am to 3:30 pm.     Discounted parking pass options can be purchased from  attendants during business hours.     -Check in at the security desk in the [facility] ([facility])   Lobby. They will direct you to the correct elevators.   -Proceed to the 3rd floor, Adult Surgery Waiting Lounge. [phone]     If you need directions, a wheelchair or escort please stop at the Information Desk in the lobby.  Inform the information person you are here for surgery; a wheelchair and escort to [address] will be provided.   An escort to the Adult Surgery Waiting Lounge will be provided. .    What can I eat or drink?  -  You may eat and drink normally up to 8 hours prior to arrival time. (Until 3.20AM)  -  You may have clear liquids until 2 hours prior to arrival time. (Until 9.20AM)    Examples of clear liquids:  Water  Clear broth  Juices (apple, white grape, white cranberry  and cider) without pulp  Noncarbonated, powder based beverages  (lemonade and Ronnell-Aid)  Sodas (Sprite, 7-Up, ginger ale and seltzer)  Coffee or tea (without milk or cream)  Gatorade    -  No Alcohol or cannabis products for at least  24 hours before surgery.     Which medicines can I take?    Hold Multivitamins for 7 days before surgery.  Hold Supplements for 7 days before surgery.  Hold Ibuprofen (Advil, Motrin) for 3 day(s) before surgery--unless otherwise directed by surgeon.  Hold Naproxen (Aleve) for 4 days before surgery.    Hold Eliquis for for 3 days before surgery. Stay on your aspirin.   Take ONLY 80% of insulin Glargine (Lantus) at bedtime the night before surgery.    -  DO NOT take these medications the day of surgery:  Miralax, Eliquis, Triamterene-hydrochlorothiazide (Dyazide), Senna.    -  PLEASE TAKE these medications the day of surgery:  Morning medications per usual except for the list mentioned above.    How do I prepare myself?  - Please take 2 showers (one the night prior to surgery and one the morning of surgery) using Scrubcare or Hibiclens soap.    Use this soap only from the neck to your toes.     Leave the soap on your skin for one minute--then rinse thoroughly.      You may use your own shampoo and conditioner. No other hair products.   - Please remove all jewelry and body piercings.  - No lotions, deodorants or fragrance.  - No makeup or fingernail polish.   - Bring your ID and insurance card.    -If you use a CPAP machine, please bring the CPAP machine, tubing, and mask to hospital.    -If you have a Deep Brain Stimulator, Spinal Cord Stimulator, or any Neuro Stimulator device---you must bring the remote control to the hospital.      ALL PATIENTS GOING HOME THE SAME DAY OF SURGERY ARE REQUIRED TO HAVE A RESPONSIBLE ADULT TO DRIVE AND BE IN ATTENDANCE WITH THEM FOR 24 HOURS FOLLOWING SURGERY.    Covid testing policy as of 12/06/2022  Your surgeon will notify and schedule you for a COVID test if one is needed before surgery--please direct any questions or COVID symptoms to your surgeon      Questions or Concerns:    - For any questions regarding the day of surgery or your hospital stay, please contact the Pre Admission  Nursing Office at [phone].       - If you have health changes between today and your surgery, please call your surgeon.       - For questions after surgery, please call your surgeons office.           Current Visitor Guidelines    You may have 2 visitors in the pre op area.    Visiting hours: 8 a.m. to 8:30 p.m.    Patients confirmed or suspected to have symptoms of COVID 19 or flu:     No visitors allowed for adult patients.   Children (under age 18) can have 1 named visitor.     People who are sick or showing symptoms of COVID 19 or flu:    Are not allowed to visit patients--we can only make exceptions in special situations.       Please follow these guidelines for your visit:          Please maintain social distance          Masking is optional--however at times you may be asked to wear a mask for the safety of yourself and others     Clean your hands with alcohol hand . Do this when you arrive at and leave the building and patient room,    And again after you touch your mask or anything in the room.     Go directly to and from the room you are visiting.     Stay in the patient s room during your visit. Limit going to other places in the hospital as much as possible     Leave bags and jackets at home or in the car.     For everyone s health, please don t come and go during your visit. That includes for smoking   during your visit.

## 2024-05-01 NOTE — PROGRESS NOTES
Erwin is a 63 year old who is being evaluated via a billable video visit.    How would you like to obtain your AVS? MyChart  If the video visit is dropped, the invitation should be resent by: Text to cell phone: [phone]    Subjective   Erwin is a 63 year old, presenting for the following health issues:  Pre-Op Exam    HPI           Physical Exam

## 2024-05-01 NOTE — H&P
Pre-Operative H & P     CC:  Preoperative exam to assess for increased cardiopulmonary risk while undergoing surgery and anesthesia.    Date of Encounter: 5/1/2024  Primary Care Physician:  Wegener, Joel Daniel Irwin     Reason for visit:   Encounter Diagnoses   Name Primary?    Pre-op evaluation Yes    Achilles tendon infection     Type 2 diabetes mellitus with diabetic polyneuropathy, with long-term current use of insulin (H)        HPI  Erwin Aguilar is a 63 year old male who presents for pre-operative H & P in preparation for  Procedure Information       Case: [id number] Date/Time: 05/09/24 1350    Procedure: Right below knee amputation (transtibial amputation) (Right: Leg)    Anesthesia type: Epidural with Block    Diagnosis: Achilles tendon infection [M65.10]    Pre-op diagnosis: Achilles tendon infection [M65.10]    [facility]    Providers: Kurtis Nye MD            Patient is being evaluated for comorbid conditions of hypertension, hyperlipidemia, paroxysmal atrial fibrillation, asthma, chronic sinusitis, osteoarthritis, type 2 diabetes, GERD, cirrhosis, gastroparesis, anxiety, depression, history of alcohol abuse (in remission x 10 years), history of cauda equina syndrome, and chronic pain.     He has a chronic heel ulcer on the posterior right heel near his Achilles insertion.  He is status post reconstruction surgery around 2020.  However soon after he developed infection with a chronic open wound and has had multiple episodes of dehiscence followed by healing more times than he can recall.  He currently has a scab in this area and reports severe localized pain.  Was recently evaluated by Dr. Nye on 2/16/2024 to discuss surgical interventions.      He was initially scheduled for surgery on 4/18/24 but this was postponed due to need for vascular surgery consultation due to 75% R SFA occlusion and concern for postoperative healing. The patient completed a right superficial femoral artery  stent placement on 4/23/24. He is now rescheduled for surgery as above.    History is obtained from the patient and chart review    Hx of abnormal bleeding,anticoagulation, or anti-platelet use: Eliquis, Aspirin 81 mg      Past Medical History  Past Medical History:   Diagnosis Date    Actinic keratosis     aldara    Anxiety     Cancer (H)     squamous cell skin CA    Cauda equina spinal cord injury (H)     Chronic sinusitis 5-1-16    Depressive disorder     Diastasis recti     Esophageal reflux     Esophageal varices in cirrhosis (H) 4/1/2014    Hospitalized for UGI blee 3/28/14, endoscopy revealed bleeding varices.    Essential hypertension, benign     Intermittent asthma     Mild depression     Mixed hyperlipidemia     Nasal polyps 5-1-16    Osteoarthritis of lumbar spine, unspecified spinal osteoarthritis complication status     Other chronic pain     Paroxysmal atrial fibrillation (H) 9/22/2021    SCCA (squamous cell carcinoma) of skin     Seasonal allergic conjunctivitis     Type II or unspecified type diabetes mellitus without mention of complication, not stated as uncontrolled     Unspecified site of spinal cord injury without evidence of spinal bone injury     due to back surgery       Past Surgical History  Past Surgical History:   Procedure Laterality Date    ABDOMEN SURGERY  2014    ANGIOGRAM Right 4/23/2024    Procedure: Ultrasound guided percutaneous transarterial left common femoral artery access, diagnostic aortoiliac angiogram, selective cannulation of right comon iliac, righ external iliac, right common femoral, and right superficial femoral artery, balloon angioplasty of right superficial femoral artery, 6mm x 12 cm self-expanding drug-coated stent placement of right superficial femoral artery, lef    BACK SURGERY  August 2009    COLONOSCOPY N/A 5/12/2016    Procedure: COMBINED COLONOSCOPY, SINGLE OR MULTIPLE BIOPSY/POLYPECTOMY BY BIOPSY;  Surgeon: Ana Paula Urbina MD;   [facility]    DECOMPRESSION CUBITAL TUNNEL Left 8/31/2023    Procedure: LEFT CUBITAL TUNNEL RELEASE,;  Surgeon: Deny Dixon MD;  Location: [facility]    ENDOSCOPY UPPER, COLONOSCOPY, COMBINED  10/19/2011    Procedure:COMBINED ENDOSCOPY UPPER, COLONOSCOPY; Upper Endoscopy, Colonoscopy with Polypectomy x4; Surgeon:AMBAR RODRÍGUEZ; [facility]    ENT SURGERY  1-2016    Ongoing since then    ESOPHAGOSCOPY, GASTROSCOPY, DUODENOSCOPY (EGD), COMBINED  3/28/2014    Procedure: COMBINED ESOPHAGOSCOPY, GASTROSCOPY, DUODENOSCOPY (EGD);  EGD, Gastric Biopsies, Esophageal Banding;  Surgeon: Reyna Tovar MD;  [facility]    ESOPHAGOSCOPY, GASTROSCOPY, DUODENOSCOPY (EGD), COMBINED  6/9/2014    Procedure: COMBINED ESOPHAGOSCOPY, GASTROSCOPY, DUODENOSCOPY (EGD);  Surgeon: Curtis Mendez MD;  [facility]    ESOPHAGOSCOPY, GASTROSCOPY, DUODENOSCOPY (EGD), COMBINED  7/24/2014    Procedure: COMBINED ESOPHAGOSCOPY, GASTROSCOPY, DUODENOSCOPY (EGD);  Surgeon: Gerard Samaniego MD;  [facility]    ESOPHAGOSCOPY, GASTROSCOPY, DUODENOSCOPY (EGD), COMBINED N/A 10/31/2014    Procedure: COMBINED ESOPHAGOSCOPY, GASTROSCOPY, DUODENOSCOPY (EGD);  Surgeon: Gerard Samaniego MD;  [facility]    ESOPHAGOSCOPY, GASTROSCOPY, DUODENOSCOPY (EGD), COMBINED N/A 5/12/2016    Procedure: COMBINED ESOPHAGOSCOPY, GASTROSCOPY, DUODENOSCOPY (EGD);  Surgeon: Ana Paula Urbina MD;  [facility]    ESOPHAGOSCOPY, GASTROSCOPY, DUODENOSCOPY (EGD), COMBINED N/A 8/2/2018    Procedure: COMBINED ESOPHAGOSCOPY, GASTROSCOPY, DUODENOSCOPY (EGD);  EGD;  Surgeon: Yu Wagner MD;  [facility]    HCL SQUAMOUS CELL CARCINOMA AG  10/07    left forearm    HERNIORRHAPHY UMBILICAL  11/8/2012    Procedure: HERNIORRHAPHY UMBILICAL;  Open Umbilical Hernia Repair With Mesh ;  Surgeon: Chase Nicholson MD;  [facility]    INSERT STIMULATOR DORSAL COLUMN N/A 6/28/2018    Procedure: INSERT STIMULATOR DORSAL  COLUMN;;  Surgeon: Elvis Sauceda MD;  [facility]    IR LOWER EXTREMITY ANGIOGRAM RIGHT  4/23/2024    neuro stimulator  2010    RELEASE CARPAL TUNNEL Left 8/31/2023    Procedure: LEFT OPEN CARPAL TUNNEL RELEASE,;  Surgeon: Deny Dixon MD;  Location: [facility]    RELEASE TRIGGER FINGER Left 8/31/2023    Procedure: Release left trigger finger thumb;  Surgeon: Deny Dixon MD;  Location: [facility]    REMOVE GENERATOR STIMULATOR (LOCATION) N/A 6/28/2018    Procedure: REMOVE GENERATOR STIMULATOR (LOCATION);  Spinal Cord Stimulator and IPG Explant and Re-Implant of SCS System (Leads and IPG);  Surgeon: Elvis Sauceda MD;  [facility]    REPAIR TENDON ACHILLES Right 11/11/2020    Procedure: Right achilles debridement and partial calcaneus excision;  Surgeon: Eh Sandoval MD;  Location: [facility]    SURGICAL HISTORY OF -   1/02    abcess tooth    SURGICAL HISTORY OF -   1999    L4-5 laminectomy, cauda equina syndrome    SURGICAL HISTORY OF -   +    herniated disk repair    TONSILLECTOMY  10 1964    TRANSPOSITION ULNAR NERVE (ELBOW)      right    ZZC APPENDECTOMY  1974       Prior to Admission Medications  Current Outpatient Medications   Medication Sig Dispense Refill    albuterol (PROAIR HFA/PROVENTIL HFA/VENTOLIN HFA) 108 (90 Base) MCG/ACT inhaler INHALE 2 PUFFS BY MOUTH EVERY 6 HOURS AS NEEDED FOR SHORTNESS OF BREATH OR WHEEZING 18 g 1    apixaban ANTICOAGULANT (ELIQUIS ANTICOAGULANT) 5 MG tablet Take 1 tablet (5 mg) by mouth 2 times daily 180 tablet 3    baclofen (LIORESAL) 10 MG tablet TAKE 2 TABLETS BY MOUTH 3 TIMES A  tablet 3    doxazosin (CARDURA) 8 MG tablet Take 1 tablet (8 mg) by mouth at bedtime 90 tablet 1    gabapentin (NEURONTIN) 300 MG capsule Take 300 mg by mouth 2 times daily Take 600 mg by mouth in the afternoon and 300 mg at bedtime      insulin glargine (LANTUS SOLOSTAR) 100 UNIT/ML pen INJECT 26 UNITS SUBCUTANEOUSLY AT BEDTIME (Patient taking  differently: Inject 26 Units Subcutaneous at bedtime INJECT 26 UNITS SUBCUTANEOUSLY AT BEDTIME) 15 mL 3    oxyCODONE (ROXICODONE) 5 MG tablet Take 1 tablet (5 mg) by mouth 4 times daily as needed for severe pain #120/month, visit with Dr. Wegener every 3 months. (Patient taking differently: Take 5 mg by mouth every morning #120/month, visit with Dr. Wegener every 3 months.) 120 tablet 0    oxyCODONE IR (ROXICODONE) 10 MG tablet Take 1 tablet (10 mg) by mouth daily as needed for severe pain In addition to 5 mg four times daily dosing. (Patient taking differently: Take 10 mg by mouth daily In addition to 5 mg four times daily dosing.) 30 tablet 0    promethazine (PHENERGAN) 25 MG tablet TAKE 1 TABLET BY MOUTH 3 TIMES A DAY AS NEEDED FOR NAUSEA 270 tablet 1    propranolol (INDERAL) 40 MG tablet Take 1 tablet (40 mg) by mouth 3 times daily 270 tablet 3    rosuvastatin (CRESTOR) 20 MG tablet Take 40 mg by mouth daily      triamterene-HCTZ (DYAZIDE) 37.5-25 MG capsule Take 1 capsule by mouth every morning 90 capsule 3    ACE/ARB NOT PRESCRIBED, INTENTIONAL, ACE & ARB not prescribed due to Allergy      aspirin 81 MG EC tablet Take 1 tablet (81 mg) by mouth daily for 360 days 90 tablet 3    Continuous Blood Gluc  (FREESTYLE CACHORRO 2 READER) GASTON USE TO READ BLOOD SUGARS AS PER 'S INSTRUCTIONS 1 each 0    insulin pen needle (GLOBAL EASE INJECT PEN NEEDLES) 32G X 4 MM miscellaneous USE AS DIRECTED EVERY  each 3    medical cannabis (Patient's own supply) See Admin Instructions (The purpose of this order is to document that the patient reports taking medical cannabis.  This is not a prescription, and is not used to certify that the patient has a qualifying medical condition.)   Flower - PRN      polyethylene glycol (MIRALAX) 17 GM/Dose powder Take 17 g by mouth daily for 7 days 119 g 0    senna-docusate (SENOKOT-S/PERICOLACE) 8.6-50 MG tablet Take 1 tablet by mouth 2 times daily as needed for  constipation 14 tablet 0    sertraline (ZOLOFT) 100 MG tablet TAKE 2 TABLETS BY MOUTH DAILY (Patient taking differently: TAKE 2 TABLETS BY MOUTH DAILY) 180 tablet 3       Allergies  Allergies   Allergen Reactions    Levaquin Anaphylaxis and Rash     Swelling in lip and tongue felt thick    Lisinopril Anaphylaxis     Swollen tongue; inability to swallow; drooling; hives; swollen face, neck, angioedema    Acetaminophen      Hx of cirrhosis     Amlodipine      Swelling, hives, possible angioedema      Morphine      b/p dropped and arms went numb  Hypotension    Quinolones Hives    Spironolactone Unknown     Pt believes himself to be allergic to it; cannot find his old records of this.-mjc     Bactrim [Sulfamethoxazole-Trimethoprim] Rash       Social History  Social History     Socioeconomic History    Marital status: Single     Spouse name: Not on file    Number of children: 0    Years of education: Not on file    Highest education level: Not on file   Occupational History    Occupation: sales     Employer: UNEMPLOYED   Tobacco Use    Smoking status: Former     Current packs/day: 0.00     Types: Cigarettes     Start date: 10/13/1983     Quit date: 1984     Years since quittin.9     Passive exposure: Past    Smokeless tobacco: Never   Vaping Use    Vaping status: Every Day    Substances: THC, CBD    Devices: Refillable tank   Substance and Sexual Activity    Alcohol use: Not Currently     Comment: - last drink was 3/28/14    Drug use: Yes     Frequency: 2.0 times per week     Types: Marijuana     Comment: medical marijuana - vape daily    Sexual activity: Not Currently     Partners: Female     Birth control/protection: Abstinence   Other Topics Concern    Parent/sibling w/ CABG, MI or angioplasty before 65F 55M? No   Social History Narrative    Not on file     Social Determinants of Health     Financial Resource Strain: Low Risk  (2024)    Financial Resource Strain     Within the past 12 months, have you or  your family members you live with been unable to get utilities (heat, electricity) when it was really needed?: No   Food Insecurity: Low Risk  (1/9/2024)    Food Insecurity     Within the past 12 months, did you worry that your food would run out before you got money to buy more?: No     Within the past 12 months, did the food you bought just not last and you didn t have money to get more?: No   Transportation Needs: High Risk (1/9/2024)    Transportation Needs     Within the past 12 months, has lack of transportation kept you from medical appointments, getting your medicines, non-medical meetings or appointments, work, or from getting things that you need?: Yes   Physical Activity: Inactive (12/31/2020)    Exercise Vital Sign     Days of Exercise per Week: 0 days     Minutes of Exercise per Session: 0 min   Stress: Not on file   Social Connections: Unknown (12/31/2020)    Social Connection and Isolation Panel [NHANES]     Frequency of Communication with Friends and Family: Not on file     Frequency of Social Gatherings with Friends and Family: Not on file     Attends Gnosticism Services: Not on file     Active Member of Clubs or Organizations: Not on file     Attends Club or Organization Meetings: Not on file     Marital Status:    Interpersonal Safety: Low Risk  (1/9/2024)    Interpersonal Safety     Do you feel physically and emotionally safe where you currently live?: Yes     Within the past 12 months, have you been hit, slapped, kicked or otherwise physically hurt by someone?: No     Within the past 12 months, have you been humiliated or emotionally abused in other ways by your partner or ex-partner?: No   Housing Stability: Low Risk  (1/9/2024)    Housing Stability     Do you have housing? : Yes     Are you worried about losing your housing?: No       Family History  Family History   Problem Relation Age of Onset    Cancer Mother         lung    Breast Cancer Mother     Other Cancer Mother     Cancer  Father         esophogeal, GERD    Snoring Father     Diabetes Brother         amputation, Type 1    Diabetes Brother     Diabetes Brother     Cancer - colorectal No family hx of     Glaucoma No family hx of     Macular Degeneration No family hx of     Anesthesia Reaction No family hx of     Venous thrombosis No family hx of        Review of Systems  The complete review of systems is negative other than noted in the HPI or here.   Anesthesia Evaluation   Pt has had prior anesthetic.     No history of anesthetic complications       ROS/MED HX  ENT/Pulmonary: Comment: Chronic sinusitis    (+)     ALISIA risk factors,  hypertension,         tobacco use, Past use,    Intermittent, asthma  Treatment: Inhaler prn,              (-) COPD and recent URI   Neurologic: Comment: History of cauda equina    (+)                     Spinal cord injury (surgical injury), year sustained: 2009,       (-) no seizures and no CVA   Cardiovascular:     (+) Dyslipidemia hypertension- -   -  - -   Taking blood thinners                     dysrhythmias, a-fib,        Previous cardiac testing   Echo: Date: 9/7/2021 Results:  Interpretation Summary   Global and regional left ventricular function is normal with an EF of 55-60%.   Right ventricular function, chamber size, wall motion, and thickness are normal.   No hemodynamcially signifcant valvular abnormalities.   Estimated mean RA pressure is mildly elevated at 8 mmHg.   No pericardial effusion is present.   There is no prior study for direct comparison.  Stress Test:  Date: Results:    ECG Reviewed:  Date: 10/25/2021 Results:  Holter  Conclusion: A tendency toward sinus bradycardia was noted throughout the monitoring time with average resting heart rate approximately 45 bpm and average heart rate less than 65 bpm which could indicate some chronotropic incompetence in an active patient.  These findings could be consistent with sinus node dysfunction.  No atrial fibrillation was noted.   Cath:   Date: Results:   (-) CAD, CHAVEZ, orthopnea/PND and irregular heartbeat/palpitations   METS/Exercise Tolerance: >4 METS Comment: Limited due to chronic pain, occasionally walks with a cane. Denies cardiac symptoms such as chest pain/pressure, dizziness, CHAVEZ, orthopnea.    Riding electric bike, but only uses left foot to pedal - rides many miles per day as this is primary form of transportation.    Hematologic:     (+)      anemia, history of blood transfusion, no previous transfusion reaction, Known PRBC Anitbodies:No    (-) history of blood clots   Musculoskeletal: Comment: History of drop foot, right  Ruptured achilles tendon, right with chronic infection  (+)  arthritis,             GI/Hepatic: Comment: Gastroparesis  Chronic nausea on phenergan    (+)   esophageal disease, Varices,         liver disease (Cirrhosis),    (-) GERD   Renal/Genitourinary:  - neg Renal ROS     Endo:     (+)  type II DM, Last HgA1c: 5.9, date: 1/9/24, Using insulin, - not using insulin pump.  not previously admitted for DM/DKA.           (-) thyroid disease and chronic steroid usage   Psychiatric/Substance Use:     (+) psychiatric history anxiety and depression alcohol abuse (sober since 2014)  Recreational drug usage: Cannabis.    Infectious Disease:  - neg infectious disease ROS     Malignancy:   (+) Malignancy, History of Skin.Skin CA Remission status post Surgery.      Other:  - neg other ROS    (+)  , H/O Chronic Pain,         Virtual visit -  No vitals were obtained    Physical Exam  Constitutional: Pleasant male, no apparent distress, and appears stated age.  Eyes: Pupils equal  HENT: Normocephalic and atraumatic  Respiratory: Non labored breathing on room air  Neurologic: Awake, alert, oriented to name, place and time.   Neuropsychiatric: Calm, cooperative. Normal affect.       Prior Labs/Diagnostic Studies   All labs and imaging personally reviewed     EKG/ stress test - if available please see in ROS above   Echo result w/o  MOPS: Interpretation SummaryGlobal and regional left ventricular function is normal with an EF of 55-60%.Right ventricular function, chamber size, wall motion, and thickness arenormal.No hemodynamcially signifcant valvular abnormalities.Estimated mean RA pressure is mildly elevated at 8 mmHg.No pericardial effusion is present. There is no prior study for direct comparison.    The patient's records and results personally reviewed by this provider.     Outside records reviewed from: Care Everywhere      Assessment  Erwin Aguilar is a 63 year old male seen as a PAC referral for risk assessment and optimization for anesthesia.    Plan/Recommendations  Pt will be optimized for the proposed procedure.  See below for details on the assessment, risk, and preoperative recommendations    NEUROLOGY  - No history of TIA, CVA or seizure  - History of intra-operative spinal cord injury 2009 complicated by cauda equina syndrome. [employer] spinal cord stimulator in place.  Patient is not currently using spinal cord stimulator.   - Chronic Pain  On chronic opiates, morphine equivilant = 45 MME/Day   -Post Op delirium risk factors:  High co-morbid index    ENT  - No current airway concerns.  Will need to be reassessed day of surgery.  Mallampati: Unable to assess  TM: Unable to assess  Upper and lower dentures     CARDIAC/VASCULAR  - No history of CAD  - Paroxysmal Afib  Anticoagulated on Eliquis  [id number] score: 2   - Hypertension  Patient has a history of allergy to multiple antihypertensives and has had difficulty controlling his BP in the past. At last in-person PAC appointment on 3/28/24 BP was well controlled. He attributes this to decreased stress and better pain management.   - Hyperlipidemia  Well controlled on home regimen  - Patient reports a history of bradycardia, HR drops into the 40s while he is asleep. He would like his anesthesia team to be aware of this.     - METS (Metabolic Equivalents)  Patient performs 4  "or more METS exercise without symptoms             Total Score: 0      RCRI-Low risk: Class 2 0.9% complication rate             Total Score: 1    RCRI: Diabetes        PULMONARY    ALISIA Medium Risk             Total Score: 3    ALISIA: Hypertension    ALISIA: Over 50 ys old    ALISIA: Male      - Asthma  Well controlled  - Tobacco History    History   Smoking Status    Former    Types: Cigarettes   Smokeless Tobacco    Never       GI  - GERD  History of GERD, resolved when patient stopped drinking alcohol almost 10 years ago.   - History of alcohol abuse/cirrhosis, sober since 2014. Most recent LFTs within normal limits. Follows with GI annually.   - History of esophageal varices with GI bleed. No recurrence. Last EGD 2018 with normal esophagus. Planning for repeat EGD in the near future.  - Chronic nausea on phenergan    PONV Medium Risk  Total Score: 2           1 AN PONV: Patient is not a current smoker    1 AN PONV: Intended Post Op Opioids        /RENAL  - Baseline Creatinine  0.67    ENDOCRINE    - BMI: Estimated body mass index is 28.41 kg/m  as calculated from the following:    Height as of this encounter: 1.778 m (5' 10\").    Weight as of this encounter: 89.8 kg (198 lb).  Overweight (BMI 25.0-29.9)  - Diabetes  Hemoglobin A1C (%)   Date Value   01/09/2024 5.9 (H)   04/09/2021 6.0 (H)     Diabetes Type 2, insulin dependent. Will update A1c prior to surgery.    Hold morning oral hypoglycemic medications and short acting insulin DOS. Take 80% of last scheduled dose of long-acting insulin prior to procedure.  Recommend close monitoring of the patient's blood glucose levels throughout the perioperative period.    HEME  VTE Low Risk 0.5%             Total Score: 3    VTE: Greater than 59 yrs old    VTE: Male      - Platelet dysfunction secondary to Aspirin (Jade, many others) 81 mg, okay to continue given recent LE stent placement.  - Coagulopathy secondary to Apixaban (Eliquis). Hold 3 days prior to neuraxial/block " anesthesia per PAC guidelines.   - History of anemia  Most recent CBC 4/2024 WNL  Recommend perioperative use of blood conservation techniques intraoperatively and close monitoring for postoperative bleeding.  A type and screen has been ordered for this patient  - History of blood transfusion. No known transfusion reaction or antibodies.     MSK  - Chronic infection of achilles tendon  Above surgery planned for further management  Patient is not currently bearing much weight on right leg. Consider fall risk precautions.   - History of multiple orthopedic surgeries  - History of drop foot and achilles tendon rupture, right  - Recommend consideration for careful positioning to limit patient discomfort.    PSYCH  - Anxiety, Depression  Continue current medication regimen    Different anesthesia methods/types have been discussed with the patient, but they are aware that the final plan will be decided by the assigned anesthesia provider on the date of service.    The patient is optimized for their procedure. AVS with information on surgery time/arrival time, meds and NPO status given by nursing staff. No further diagnostic testing indicated.    Please refer to the physical examination documented by the anesthesiologist in the anesthesia record on the day of surgery.    Video-Visit Details    Type of service:  Video Visit    Provider received verbal consent for a Video Visit from the patient? Yes   Video Start Time:  0914  Video End Time: 0926    Originating Location (pt. Location): Other outside    Distant Location (provider location):  Off-site  Mode of Communication:  Video Conference via [employer]  On the day of service:     Prep time: 22 minutes  Visit time: 12 minutes  Documentation time: 6 minutes  ------------------------------------------  Total time: 40 minutes      Miya Cummings PA-C  Preoperative Assessment Center  [facility]  [facility]  Phone: [phone]  Fax:  [phone]

## 2024-05-02 LAB
ABO/RH(D): NORMAL
ANTIBODY SCREEN: NEGATIVE
SPECIMEN EXPIRATION DATE: NORMAL

## 2024-05-03 ENCOUNTER — ANCILLARY PROCEDURE (OUTPATIENT)
Dept: ULTRASOUND IMAGING | Facility: CLINIC | Age: 64
End: 2024-05-03
Attending: SURGERY
Payer: MEDICARE

## 2024-05-03 ENCOUNTER — LAB (OUTPATIENT)
Dept: LAB | Facility: CLINIC | Age: 64
End: 2024-05-03
Payer: MEDICARE

## 2024-05-03 DIAGNOSIS — Z01.818 PRE-OP EVALUATION: ICD-10-CM

## 2024-05-03 DIAGNOSIS — Z12.5 SCREENING FOR PROSTATE CANCER: Primary | ICD-10-CM

## 2024-05-03 DIAGNOSIS — E11.42 TYPE 2 DIABETES MELLITUS WITH DIABETIC POLYNEUROPATHY, WITH LONG-TERM CURRENT USE OF INSULIN (H): ICD-10-CM

## 2024-05-03 DIAGNOSIS — Z98.890 POSTOPERATIVE STATE: ICD-10-CM

## 2024-05-03 DIAGNOSIS — Z79.4 TYPE 2 DIABETES MELLITUS WITH DIABETIC POLYNEUROPATHY, WITH LONG-TERM CURRENT USE OF INSULIN (H): ICD-10-CM

## 2024-05-03 DIAGNOSIS — I73.9 PAD (PERIPHERAL ARTERY DISEASE) (H): ICD-10-CM

## 2024-05-03 LAB
HBA1C MFR BLD: 5.9 %
PSA SERPL DL<=0.01 NG/ML-MCNC: 0.25 NG/ML (ref 0–4.5)

## 2024-05-03 PROCEDURE — G0103 PSA SCREENING: HCPCS | Performed by: PATHOLOGY

## 2024-05-03 PROCEDURE — 93922 UPR/L XTREMITY ART 2 LEVELS: CPT | Performed by: RADIOLOGY

## 2024-05-03 PROCEDURE — 83036 HEMOGLOBIN GLYCOSYLATED A1C: CPT | Performed by: PHYSICIAN ASSISTANT

## 2024-05-03 PROCEDURE — 99000 SPECIMEN HANDLING OFFICE-LAB: CPT | Performed by: PATHOLOGY

## 2024-05-03 PROCEDURE — 93925 LOWER EXTREMITY STUDY: CPT | Performed by: RADIOLOGY

## 2024-05-03 PROCEDURE — 36415 COLL VENOUS BLD VENIPUNCTURE: CPT | Performed by: PATHOLOGY

## 2024-05-03 PROCEDURE — 86900 BLOOD TYPING SEROLOGIC ABO: CPT | Performed by: PHYSICIAN ASSISTANT

## 2024-05-06 ENCOUNTER — VIRTUAL VISIT (OUTPATIENT)
Dept: VASCULAR SURGERY | Facility: CLINIC | Age: 64
End: 2024-05-06
Attending: SURGERY
Payer: MEDICARE

## 2024-05-06 VITALS — OXYGEN SATURATION: 98 % | BODY MASS INDEX: 28.35 KG/M2 | HEIGHT: 70 IN | WEIGHT: 198 LBS | HEART RATE: 49 BPM

## 2024-05-06 DIAGNOSIS — I70.209 FEMORAL ARTERY STENOSIS (H): Primary | ICD-10-CM

## 2024-05-06 DIAGNOSIS — Z48.812 ENCOUNTER FOR SURGICAL AFTERCARE FOLLOWING SURGERY ON THE CIRCULATORY SYSTEM: ICD-10-CM

## 2024-05-06 PROCEDURE — 99024 POSTOP FOLLOW-UP VISIT: CPT | Mod: 93 | Performed by: NURSE PRACTITIONER

## 2024-05-06 ASSESSMENT — PAIN SCALES - GENERAL: PAINLEVEL: EXTREME PAIN (9)

## 2024-05-06 NOTE — PROGRESS NOTES
VASCULAR SURGERY PROGRESS NOTE    LOCATION: [facility]     Erwin Aguilar  Medical Record #:  [id number]  YOB: 1960  Age:  63 year old     Date of Service: 5/6/2024    PRIMARY CARE PROVIDER: Wegener, Joel Daniel Irwin    Reason for visit: Postop wound assessment    IMPRESSION / RECOMMENDATION:   Erwin Aguilar is a pleasant 63-year-old gentleman with known PAD, right foot neuropathic pain and nonhealing Achilles tendon injury who underwent angiogram with right SFA stenting from which continues to recover well.  He joins me today via telephone visit. Mr. Aguilar reports significantly improved sensation in the right foot however continues to have pain in his Achilles tendon, thus he plans to proceed with amputation surgery with orthopedics team. His ABIs demonstrate significant improvement up to 0.89 previously at 0.53 on the right. Patient denies groin swelling, denies bruising, hematoma or any other discomfort in left groin (used for access).     Recommend follow-up with arterial duplex in 3 months to ensure patency of his right SFA stent.    All questions were answered and support provided. He has our contact information and knows to reach out with any additional questions or concerns.     Alejandra Doran, [facility]  Vascular Surgery  Pager: [phone]  [email or web address]  Send message or 10 digit call back number Securely via Trufa with the [employer] Web Console (learn more here)        HPI:  Erwin Aguilar is a 63 year old male with past medical history significant for PAD (right SFA 75% stenosis), right foot neuropathic pain and non-healing Achilles tendon injury, now s/p angiogram with right SFA stenting, recovering well.  Continues to do well at home, now planning for potential BKA with orthopedic surgery given nonhealing Achilles tendon. Mr. Aguilar is grateful for the care vascular surgery has provided so far and excited to finalize this procedure and pursue prosthesis.  No  other concerns    REVIEW OF SYSTEMS:    A 12 point ROS was reviewed and is negative except for what is listed above in HPI.    PHH:    Past Medical History:   Diagnosis Date    Actinic keratosis     aldara    Anxiety     Cancer (H)     squamous cell skin CA    Cauda equina spinal cord injury (H)     Chronic sinusitis 5-1-16    Depressive disorder     Diastasis recti     Esophageal reflux     Esophageal varices in cirrhosis (H) 4/1/2014    Hospitalized for UGI blee 3/28/14, endoscopy revealed bleeding varices.    Essential hypertension, benign     Intermittent asthma     Mild depression     Mixed hyperlipidemia     Nasal polyps 5-1-16    Osteoarthritis of lumbar spine, unspecified spinal osteoarthritis complication status     Other chronic pain     Paroxysmal atrial fibrillation (H) 9/22/2021    SCCA (squamous cell carcinoma) of skin     Seasonal allergic conjunctivitis     Type II or unspecified type diabetes mellitus without mention of complication, not stated as uncontrolled     Unspecified site of spinal cord injury without evidence of spinal bone injury     due to back surgery          Past Surgical History:   Procedure Laterality Date    ABDOMEN SURGERY  2014    ANGIOGRAM Right 4/23/2024    Procedure: Ultrasound guided percutaneous transarterial left common femoral artery access, diagnostic aortoiliac angiogram, selective cannulation of right comon iliac, righ external iliac, right common femoral, and right superficial femoral artery, balloon angioplasty of right superficial femoral artery, 6mm x 12 cm self-expanding drug-coated stent placement of right superficial femoral artery, lef    BACK SURGERY  August 2009    COLONOSCOPY N/A 5/12/2016    Procedure: COMBINED COLONOSCOPY, SINGLE OR MULTIPLE BIOPSY/POLYPECTOMY BY BIOPSY;  Surgeon: Ana Paula Urbina MD;  Location: [facility] GI    DECOMPRESSION CUBITAL TUNNEL Left 8/31/2023    Procedure: LEFT CUBITAL TUNNEL RELEASE,;  Surgeon: Deny Dixon MD;   [place] [facility] [place]    ENDOSCOPY UPPER, COLONOSCOPY, COMBINED  10/19/2011    Procedure:COMBINED ENDOSCOPY UPPER, COLONOSCOPY; Upper Endoscopy, Colonoscopy with Polypectomy x4; Surgeon:AMBAR RODRÍGUEZ; Location: OR    ENT SURGERY  1-2016    Ongoing since then    ESOPHAGOSCOPY, GASTROSCOPY, DUODENOSCOPY (EGD), COMBINED  3/28/2014    Procedure: COMBINED ESOPHAGOSCOPY, GASTROSCOPY, DUODENOSCOPY (EGD);  EGD, Gastric Biopsies, Esophageal Banding;  Surgeon: Reyna Tovar MD;  [facility]    ESOPHAGOSCOPY, GASTROSCOPY, DUODENOSCOPY (EGD), COMBINED  6/9/2014    Procedure: COMBINED ESOPHAGOSCOPY, GASTROSCOPY, DUODENOSCOPY (EGD);  Surgeon: Curtis Mendez MD;  Location:  GI    ESOPHAGOSCOPY, GASTROSCOPY, DUODENOSCOPY (EGD), COMBINED  7/24/2014    Procedure: COMBINED ESOPHAGOSCOPY, GASTROSCOPY, DUODENOSCOPY (EGD);  Surgeon: Gerard Samaniego MD;  Location:  OR    ESOPHAGOSCOPY, GASTROSCOPY, DUODENOSCOPY (EGD), COMBINED N/A 10/31/2014    Procedure: COMBINED ESOPHAGOSCOPY, GASTROSCOPY, DUODENOSCOPY (EGD);  Surgeon: Gerard Samaniego MD;  Location:  OR    ESOPHAGOSCOPY, GASTROSCOPY, DUODENOSCOPY (EGD), COMBINED N/A 5/12/2016    Procedure: COMBINED ESOPHAGOSCOPY, GASTROSCOPY, DUODENOSCOPY (EGD);  Surgeon: Ana Paula Urbina MD;  Location:  GI    ESOPHAGOSCOPY, GASTROSCOPY, DUODENOSCOPY (EGD), COMBINED N/A 8/2/2018    Procedure: COMBINED ESOPHAGOSCOPY, GASTROSCOPY, DUODENOSCOPY (EGD);  EGD;  Surgeon: Yu Wagner MD;  Location:  GI    HCL SQUAMOUS CELL CARCINOMA AG  10/07    left forearm    HERNIORRHAPHY UMBILICAL  11/8/2012    Procedure: HERNIORRHAPHY UMBILICAL;  Open Umbilical Hernia Repair With Mesh ;  Surgeon: Chase Nicholson MD;  Location:  OR    INSERT STIMULATOR DORSAL COLUMN N/A 6/28/2018    Procedure: INSERT STIMULATOR DORSAL COLUMN;;  Surgeon: Elvis Sauceda MD;  Location: [facility] OR    IR LOWER EXTREMITY ANGIOGRAM RIGHT  4/23/2024    neuro  stimulator  2010    RELEASE CARPAL TUNNEL Left 8/31/2023    Procedure: LEFT OPEN CARPAL TUNNEL RELEASE,;  Surgeon: Deny Dixon MD;  Location: [facility] OR    RELEASE TRIGGER FINGER Left 8/31/2023    Procedure: Release left trigger finger thumb;  Surgeon: Deny Dixon MD;  Location: [facility] OR    REMOVE GENERATOR STIMULATOR (LOCATION) N/A 6/28/2018    Procedure: REMOVE GENERATOR STIMULATOR (LOCATION);  Spinal Cord Stimulator and IPG Explant and Re-Implant of SCS System (Leads and IPG);  Surgeon: Elvis Sauceda MD;  Location: [facility] OR    REPAIR TENDON ACHILLES Right 11/11/2020    Procedure: Right achilles debridement and partial calcaneus excision;  Surgeon: Eh Sandoval MD;  Location: [facility] OR    SURGICAL HISTORY OF -   1/02    abcess tooth    SURGICAL HISTORY OF -   1999    L4-5 laminectomy, cauda equina syndrome    SURGICAL HISTORY OF -   +    herniated disk repair    TONSILLECTOMY  10 1964    TRANSPOSITION ULNAR NERVE (ELBOW)      right    ZZC APPENDECTOMY  1974       ALLERGIES:  Levaquin, Lisinopril, Acetaminophen, Amlodipine, Morphine, Quinolones, Spironolactone, and Bactrim [sulfamethoxazole-trimethoprim]    MEDS:    Current Outpatient Medications:     ACE/ARB NOT PRESCRIBED, INTENTIONAL,, ACE & ARB not prescribed due to Allergy, Disp: , Rfl:     albuterol (PROAIR HFA/PROVENTIL HFA/VENTOLIN HFA) 108 (90 Base) MCG/ACT inhaler, INHALE 2 PUFFS BY MOUTH EVERY 6 HOURS AS NEEDED FOR SHORTNESS OF BREATH OR WHEEZING, Disp: 18 g, Rfl: 1    apixaban ANTICOAGULANT (ELIQUIS ANTICOAGULANT) 5 MG tablet, Take 1 tablet (5 mg) by mouth 2 times daily, Disp: 180 tablet, Rfl: 3    aspirin 81 MG EC tablet, Take 1 tablet (81 mg) by mouth daily for 360 days, Disp: 90 tablet, Rfl: 3    baclofen (LIORESAL) 10 MG tablet, TAKE 2 TABLETS BY MOUTH 3 TIMES A DAY, Disp: 360 tablet, Rfl: 3    Continuous Blood Gluc  (FREESTYLE CACHORRO 2 READER) GASTON, USE TO READ BLOOD SUGARS AS PER 'S INSTRUCTIONS, Disp: 1  each, Rfl: 0    doxazosin (CARDURA) 8 MG tablet, Take 1 tablet (8 mg) by mouth at bedtime, Disp: 90 tablet, Rfl: 1    gabapentin (NEURONTIN) 300 MG capsule, Take 300 mg by mouth 2 times daily Take 600 mg by mouth in the afternoon and 300 mg at bedtime, Disp: , Rfl:     insulin glargine (LANTUS SOLOSTAR) 100 UNIT/ML pen, INJECT 26 UNITS SUBCUTANEOUSLY AT BEDTIME (Patient taking differently: Inject 26 Units Subcutaneous at bedtime INJECT 26 UNITS SUBCUTANEOUSLY AT BEDTIME), Disp: 15 mL, Rfl: 3    insulin pen needle (GLOBAL EASE INJECT PEN NEEDLES) 32G X 4 MM miscellaneous, USE AS DIRECTED EVERY DAY, Disp: 100 each, Rfl: 3    medical cannabis (Patient's own supply), See Admin Instructions (The purpose of this order is to document that the patient reports taking medical cannabis.  This is not a prescription, and is not used to certify that the patient has a qualifying medical condition.)  Flower - PRN, Disp: , Rfl:     oxyCODONE (ROXICODONE) 5 MG tablet, Take 1 tablet (5 mg) by mouth 4 times daily as needed for severe pain #120/month, visit with Dr. Wegener every 3 months. (Patient taking differently: Take 5 mg by mouth every morning #120/month, visit with Dr. Wegener every 3 months.), Disp: 120 tablet, Rfl: 0    oxyCODONE IR (ROXICODONE) 10 MG tablet, Take 1 tablet (10 mg) by mouth daily as needed for severe pain In addition to 5 mg four times daily dosing. (Patient taking differently: Take 10 mg by mouth daily In addition to 5 mg four times daily dosing.), Disp: 30 tablet, Rfl: 0    promethazine (PHENERGAN) 25 MG tablet, TAKE 1 TABLET BY MOUTH 3 TIMES A DAY AS NEEDED FOR NAUSEA, Disp: 270 tablet, Rfl: 1    propranolol (INDERAL) 40 MG tablet, Take 1 tablet (40 mg) by mouth 3 times daily, Disp: 270 tablet, Rfl: 3    rosuvastatin (CRESTOR) 20 MG tablet, Take 40 mg by mouth daily, Disp: , Rfl:     sertraline (ZOLOFT) 100 MG tablet, TAKE 2 TABLETS BY MOUTH DAILY (Patient taking differently: TAKE 2 TABLETS BY MOUTH DAILY),  "Disp: 180 tablet, Rfl: 3    triamterene-HCTZ (DYAZIDE) 37.5-25 MG capsule, Take 1 capsule by mouth every morning, Disp: 90 capsule, Rfl: 3    SOCIAL HABITS:    History   Smoking Status    Former    Types: Cigarettes   Smokeless Tobacco    Never     Social History    Substance and Sexual Activity      Alcohol use: Not Currently        Comment: - last drink was 3/28/14      History   Drug Use    Frequency: 2.0 times per week    Types: Marijuana     Comment: medical marijuana - vape daily       FAMILY HISTORY:    Family History   Problem Relation Age of Onset    Cancer Mother         lung    Breast Cancer Mother     Other Cancer Mother     Cancer Father         esophogeal, GERD    Snoring Father     Diabetes Brother         amputation, Type 1    Diabetes Brother     Diabetes Brother     Cancer - colorectal No family hx of     Glaucoma No family hx of     Macular Degeneration No family hx of     Anesthesia Reaction No family hx of     Venous thrombosis No family hx of        PE:  Pulse (!) 49   Ht 1.778 m (5' 10\")   Wt 89.8 kg (198 lb)   SpO2 98%   BMI 28.41 kg/m    Wt Readings from Last 1 Encounters:   05/06/24 89.8 kg (198 lb)     Body mass index is 28.41 kg/m .    EXAM:  GENERAL: alert and no distress, voice is calm  RESP: No audible wheeze, cough.    PSYCH: Appropriate affect, tone, and pace of words    DIAGNOSTIC STUDIES:     Images:  US Lower Extremity Arterial Duplex Bilateral    Result Date: 5/3/2024  Exam: Duplex ultrasound of bilateral lower extremity arteries dated 5/3/2024 11:01 AM Clinical information: PAD (peripheral artery disease) (H24); Postoperative state . Right SFA/pop stent placement on 4/23/2024 Comparison: 3/28/2024 and 5/3/2024 Technique: Grayscale (B-mode), color Doppler, and duplex spectral Doppler ultrasound of the lower extremity arteries. Velocity measurements obtained with angle correction of 60 degrees or less. Ordering provider: RILEY NUNN Findings: Right lower extremity: EIA: " Velocity: 98/10 cm/sec.  Waveforms: Multiphasic Common femoral artery: Velocity: 132 cm/s cm/sec. Waveforms: Multiphasic Profunda femoral artery: Velocity: 130 cm/s cm/sec. Waveforms: Multiphasic Proximal SFA : Velocity: 238 cm/s cm/sec. Waveforms: Multiphasic Mid to distal SFA (proximal to stent, proximal stent, mid stent, distal stent, and distal to stent): Velocity:  141/18, 130/17, 67/13, 55/11, and 58/15 cm/sec. Waveforms: Monophasic Popliteal artery, proximal: Velocity: 136/35 cm/sec. Waveforms: Monophasic Popliteal artery, distal: Velocity: 169/21 cm/sec. Waveforms: Monophasic PTA ankle: Velocity: 54 cm/s cm/sec. Waveforms: Monophasic RUDOLPH ankle: Velocity: 76 cm/s cm/sec. Waveforms: Monophasic Left lower extremity: EIA: Velocity: 103 cm/s cm/sec.  Waveforms: Multiphasic Common femoral artery: Velocity: 105 cm/s cm/sec. Waveforms: Multiphasic Deep femoral artery: Velocity: 165 cm/s cm/sec. Waveforms: Multiphasic Proximal SFA: Velocity: 91 cm/sec. Waveforms: Multiphasic Mid SFA: Velocity: 183 cm/s cm/sec. Waveforms: Multiphasic Distal SFA: Velocity: 143 cm/sec. Waveforms: Multiphasic Popliteal artery, proximal: Velocity: 72 cm/s cm/sec. Waveforms: Multiphasic Popliteal artery, distal: Velocity: 85 cm/sec. Waveforms: Multiphasic PTA ankle: Velocity: 67 cm/s cm/sec. Waveforms: Multiphasic RUDOLPH ankle: Velocity: 77 cm/s cm/sec. Waveforms: Multiphasic     Impression: 1. Right leg: Patent right mid to distal SFA stent with slight changes of the waveform from multiphasic to monophasic which could be related to distal vasodilatation. 2. Left leg: Patent left lower 70 arteries. Guidelines: [facility] duplex criteria for lower limb arterial occlusive disease Percent stenosis: Normal (1-19%): Peak systolic velocity (cm/s): <150, End-diastolic velocity (cm/s): <40, Velocity ratio (Vr): <1.5, Distal arterial waveform: Triphasic 20-49%: Peak systolic velocity (cm/s): 150-200, End-diastolic velocity (cm/s):  <40, Velocity ratio (Vr): 1.5-2.0, Distal arterial waveform: Triphasic 50-75%: Peak systolic velocity (cm/s): 200-300, End-diastolic velocity (cm/s): <90, Velocity ratio (Vr): 2.0-3.9, Distal arterial waveform: Poststenotic turbulence distal to stenosis, monophasic distal waveform >75%: Peak systolic velocity (cm/s): >300, End-diastolic velocity (cm/s): <90, Velocity ratio (Vr): >4.0, Distal arterial waveform: Dampened distal waveform and low PSV/EDV* in the stenosis Occlusion: Absent flow by color Doppler/pulsed Doppler spectral analysis; length of occlusion estimated from distance between exit and reentry collateral arteries *PSV = peak systolic velocity, EDV = end-diastolic velocity [email or web address] html CINDY VAUGHAN MD   SYSTEM ID:  [id number]    US LIZZY Doppler No Exercise 1-2 Levels Bilateral    Result Date: 5/3/2024  Exam: Bilateral lower extremity resting ankle brachial indices dated 5/3/2024 11:01 AM Comparison study: None Clinical history: PAD (peripheral artery disease) (H24); Postoperative state Ordering provider: RILEY NUNN Technique: Bilateral lower extremity resting ankle brachial indices obtained. Findings: Right:  Arm: 152 mmHg  PT at ankle: 110 mmHg  DP at foot: 135 mmHg  LIZZY: 0.89 Left:  Arm: 146 mmHg  PT at ankle: 151 mmHg  DP at foot: 141 mmHg  LIZZY: 0.99     Impression: Right leg: Resting LIZZY is 0.89. Mild to moderately abnormal, 0.41-0.90. Left leg: Resting LIZZY is 0.99. Borderline (0.91 to 0.99). Guidelines: LIZZY Diagnostic Criteria (Based on criteria published in Circulation 2011; 124: 9372-6427):   > 1.4: Non compressible   1.00 - 1.40: Normal   0.91 - 0.99: Borderline   At or below 0.90: Abnormal LIZZY Diagnostic Criteria (Based on ACC/AHA guideline 2008):   >/=1.3 - non compressible vessels   1.00  -1.29 - Normal   0.91 - 0.99 - Borderline   0.41 - 0.90 - Mild to moderate PAD   0.00 - 0.40 - Severe PAD CINDY VAUGHAN MD   SYSTEM ID:   [id number]    Virtual Visit Details    Type of service:  Telephone Visit   Phone call duration: 30 minutes   Originating Location (pt. Location): Home    Distant Location (provider location):  On-site    [facility]      Type of Visit: Virtual: Telephone    Patient is here for a return visit to discuss US done 5/3. 2 wk s/p RLE angio .     Vitals - Patient Reported  Pain Score: Extreme Pain (9)  Pain Loc: Other - see comment (foot)      Questions patient would like addressed today are: NA, will discuss any at appt time    Refills are needed: No    How would you like to obtain your AVS? Pancho Henley MA

## 2024-05-06 NOTE — NURSING NOTE
Is the patient currently in the state of [place]? YES    Visit mode:TELEPHONE    If the visit is dropped, the patient can be reconnected by: TELEPHONE VISIT: to cell phone:   Telephone Information:   Mobile [phone]       Will anyone else be joining the visit? NO  (If patient encounters technical issues they should call [phone] :[phone])    How would you like to obtain your AVS? MyChart    Are changes needed to the allergy or medication list? No    Patient denies any changes since echeck-in completion and states all information entered during echeck-in remains accurate.    Are refills needed on medications prescribed by this physician? NO    Reason for visit: ERNA DiamondF

## 2024-05-06 NOTE — LETTER
5/6/2024       RE: Erwin Aguilar  [address]  [place] [address]     Dear Colleague,    Thank you for referring your patient, Erwin Aguilar, to the [facility] [place] at [facility]. Please see a copy of my visit note below.        VASCULAR SURGERY PROGRESS NOTE    LOCATION: [facility]     Erwin Aguilar  Medical Record #:  [id number]  YOB: 1960  Age:  63 year old     Date of Service: 5/6/2024    PRIMARY CARE PROVIDER: Wegener, Joel Daniel Irwin    Reason for visit: Postop wound assessment    IMPRESSION / RECOMMENDATION:   Erwin Aguilar is a pleasant 63-year-old gentleman with known PAD, right foot neuropathic pain and nonhealing Achilles tendon injury who underwent angiogram with right SFA stenting from which continues to recover well.  He joins me today via telephone visit. Mr. Aguilar reports significantly improved sensation in the right foot however continues to have pain in his Achilles tendon, thus he plans to proceed with amputation surgery with orthopedics team. His ABIs demonstrate significant improvement up to 0.89 previously at 0.53 on the right. Patient denies groin swelling, denies bruising, hematoma or any other discomfort in left groin (used for access).     Recommend follow-up with arterial duplex in 3 months to ensure patency of his right SFA stent.    All questions were answered and support provided. He has our contact information and knows to reach out with any additional questions or concerns.     Alejandra Doran, [facility]  Vascular Surgery  Pager: [phone]  [email or web address]  Send message or 10 digit call back number Securely via Mo[employer] with the Vocera Web Console (learn more here)        HPI:  Erwin Aguilar is a 63 year old male with past medical history significant for PAD (right SFA 75% stenosis), right foot neuropathic pain and non-healing Achilles tendon injury, now  s/p angiogram with right SFA stenting, recovering well.  Continues to do well at home, now planning for potential BKA with orthopedic surgery given nonhealing Achilles tendon. Mr. Aguilar is grateful for the care vascular surgery has provided so far and excited to finalize this procedure and pursue prosthesis.  No other concerns    REVIEW OF SYSTEMS:    A 12 point ROS was reviewed and is negative except for what is listed above in HPI.    PHH:    Past Medical History:   Diagnosis Date    Actinic keratosis     aldara    Anxiety     Cancer (H)     squamous cell skin CA    Cauda equina spinal cord injury (H)     Chronic sinusitis 5-1-16    Depressive disorder     Diastasis recti     Esophageal reflux     Esophageal varices in cirrhosis (H) 4/1/2014    Hospitalized for UGI blee 3/28/14, endoscopy revealed bleeding varices.    Essential hypertension, benign     Intermittent asthma     Mild depression     Mixed hyperlipidemia     Nasal polyps 5-1-16    Osteoarthritis of lumbar spine, unspecified spinal osteoarthritis complication status     Other chronic pain     Paroxysmal atrial fibrillation (H) 9/22/2021    SCCA (squamous cell carcinoma) of skin     Seasonal allergic conjunctivitis     Type II or unspecified type diabetes mellitus without mention of complication, not stated as uncontrolled     Unspecified site of spinal cord injury without evidence of spinal bone injury     due to back surgery          Past Surgical History:   Procedure Laterality Date    ABDOMEN SURGERY  2014    ANGIOGRAM Right 4/23/2024    Procedure: Ultrasound guided percutaneous transarterial left common femoral artery access, diagnostic aortoiliac angiogram, selective cannulation of right comon iliac, righ external iliac, right common femoral, and right superficial femoral artery, balloon angioplasty of right superficial femoral artery, 6mm x 12 cm self-expanding drug-coated stent placement of right superficial femoral artery, lef    BACK  SURGERY  August 2009    COLONOSCOPY N/A 5/12/2016    Procedure: COMBINED COLONOSCOPY, SINGLE OR MULTIPLE BIOPSY/POLYPECTOMY BY BIOPSY;  Surgeon: Ana Paula Urbina MD;  [facility]    DECOMPRESSION CUBITAL TUNNEL Left 8/31/2023    Procedure: LEFT CUBITAL TUNNEL RELEASE,;  Surgeon: Deny Dixon MD;  Location: [facility] OR    ENDOSCOPY UPPER, COLONOSCOPY, COMBINED  10/19/2011    Procedure:COMBINED ENDOSCOPY UPPER, COLONOSCOPY; Upper Endoscopy, Colonoscopy with Polypectomy x4; Surgeon:AMBAR RODRÍGUEZIQ; Location: OR    ENT SURGERY  1-2016    Ongoing since then    ESOPHAGOSCOPY, GASTROSCOPY, DUODENOSCOPY (EGD), COMBINED  3/28/2014    Procedure: COMBINED ESOPHAGOSCOPY, GASTROSCOPY, DUODENOSCOPY (EGD);  EGD, Gastric Biopsies, Esophageal Banding;  Surgeon: Reyna Tovar MD;  Location:  OR    ESOPHAGOSCOPY, GASTROSCOPY, DUODENOSCOPY (EGD), COMBINED  6/9/2014    Procedure: COMBINED ESOPHAGOSCOPY, GASTROSCOPY, DUODENOSCOPY (EGD);  Surgeon: Curtis Mendez MD;  Location:  GI    ESOPHAGOSCOPY, GASTROSCOPY, DUODENOSCOPY (EGD), COMBINED  7/24/2014    Procedure: COMBINED ESOPHAGOSCOPY, GASTROSCOPY, DUODENOSCOPY (EGD);  Surgeon: Gerard Samaniego MD;  Location:  OR    ESOPHAGOSCOPY, GASTROSCOPY, DUODENOSCOPY (EGD), COMBINED N/A 10/31/2014    Procedure: COMBINED ESOPHAGOSCOPY, GASTROSCOPY, DUODENOSCOPY (EGD);  Surgeon: Gerard Samaniego MD;  Location:  OR    ESOPHAGOSCOPY, GASTROSCOPY, DUODENOSCOPY (EGD), COMBINED N/A 5/12/2016    Procedure: COMBINED ESOPHAGOSCOPY, GASTROSCOPY, DUODENOSCOPY (EGD);  Surgeon: Ana Paula Urbina MD;  Location:  GI    ESOPHAGOSCOPY, GASTROSCOPY, DUODENOSCOPY (EGD), COMBINED N/A 8/2/2018    Procedure: COMBINED ESOPHAGOSCOPY, GASTROSCOPY, DUODENOSCOPY (EGD);  EGD;  Surgeon: Yu Wagner MD;  [facility]    HCL SQUAMOUS CELL CARCINOMA AG  10/07    left forearm    HERNIORRHAPHY UMBILICAL  11/8/2012    Procedure: HERNIORRHAPHY UMBILICAL;  Open  Umbilical Hernia Repair With Mesh ;  Surgeon: Chase Nicholson MD;  Location: [facility] OR    INSERT STIMULATOR DORSAL COLUMN N/A 6/28/2018    Procedure: INSERT STIMULATOR DORSAL COLUMN;;  Surgeon: Elvis Sauceda MD;  [facility]    IR LOWER EXTREMITY ANGIOGRAM RIGHT  4/23/2024    neuro stimulator  2010    RELEASE CARPAL TUNNEL Left 8/31/2023    Procedure: LEFT OPEN CARPAL TUNNEL RELEASE,;  Surgeon: Deny Dixon MD;  Location: [facility] OR    RELEASE TRIGGER FINGER Left 8/31/2023    Procedure: Release left trigger finger thumb;  Surgeon: Deny Dixon MD;  Location: [facility] OR    REMOVE GENERATOR STIMULATOR (LOCATION) N/A 6/28/2018    Procedure: REMOVE GENERATOR STIMULATOR (LOCATION);  Spinal Cord Stimulator and IPG Explant and Re-Implant of SCS System (Leads and IPG);  Surgeon: Elvis Sauceda MD;  [facility]    REPAIR TENDON ACHILLES Right 11/11/2020    Procedure: Right achilles debridement and partial calcaneus excision;  Surgeon: Eh Sandoval MD;  Location: [facility] OR    SURGICAL HISTORY OF -   1/02    abcess tooth    SURGICAL HISTORY OF -   1999    L4-5 laminectomy, cauda equina syndrome    SURGICAL HISTORY OF -   +    herniated disk repair    TONSILLECTOMY  10 1964    TRANSPOSITION ULNAR NERVE (ELBOW)      right    ZZC APPENDECTOMY  1974       ALLERGIES:  Levaquin, Lisinopril, Acetaminophen, Amlodipine, Morphine, Quinolones, Spironolactone, and Bactrim [sulfamethoxazole-trimethoprim]    MEDS:    Current Outpatient Medications:     ACE/ARB NOT PRESCRIBED, INTENTIONAL,, ACE & ARB not prescribed due to Allergy, Disp: , Rfl:     albuterol (PROAIR HFA/PROVENTIL HFA/VENTOLIN HFA) 108 (90 Base) MCG/ACT inhaler, INHALE 2 PUFFS BY MOUTH EVERY 6 HOURS AS NEEDED FOR SHORTNESS OF BREATH OR WHEEZING, Disp: 18 g, Rfl: 1    apixaban ANTICOAGULANT (ELIQUIS ANTICOAGULANT) 5 MG tablet, Take 1 tablet (5 mg) by mouth 2 times daily, Disp: 180 tablet, Rfl: 3    aspirin 81 MG EC tablet, Take  1 tablet (81 mg) by mouth daily for 360 days, Disp: 90 tablet, Rfl: 3    baclofen (LIORESAL) 10 MG tablet, TAKE 2 TABLETS BY MOUTH 3 TIMES A DAY, Disp: 360 tablet, Rfl: 3    Continuous Blood Gluc  (FREESTYLE CACHORRO 2 READER) GASTON, USE TO READ BLOOD SUGARS AS PER 'S INSTRUCTIONS, Disp: 1 each, Rfl: 0    doxazosin (CARDURA) 8 MG tablet, Take 1 tablet (8 mg) by mouth at bedtime, Disp: 90 tablet, Rfl: 1    gabapentin (NEURONTIN) 300 MG capsule, Take 300 mg by mouth 2 times daily Take 600 mg by mouth in the afternoon and 300 mg at bedtime, Disp: , Rfl:     insulin glargine (LANTUS SOLOSTAR) 100 UNIT/ML pen, INJECT 26 UNITS SUBCUTANEOUSLY AT BEDTIME (Patient taking differently: Inject 26 Units Subcutaneous at bedtime INJECT 26 UNITS SUBCUTANEOUSLY AT BEDTIME), Disp: 15 mL, Rfl: 3    insulin pen needle (GLOBAL EASE INJECT PEN NEEDLES) 32G X 4 MM miscellaneous, USE AS DIRECTED EVERY DAY, Disp: 100 each, Rfl: 3    medical cannabis (Patient's own supply), See Admin Instructions (The purpose of this order is to document that the patient reports taking medical cannabis.  This is not a prescription, and is not used to certify that the patient has a qualifying medical condition.)  Flower - PRN, Disp: , Rfl:     oxyCODONE (ROXICODONE) 5 MG tablet, Take 1 tablet (5 mg) by mouth 4 times daily as needed for severe pain #120/month, visit with Dr. Wegener every 3 months. (Patient taking differently: Take 5 mg by mouth every morning #120/month, visit with Dr. Wegener every 3 months.), Disp: 120 tablet, Rfl: 0    oxyCODONE IR (ROXICODONE) 10 MG tablet, Take 1 tablet (10 mg) by mouth daily as needed for severe pain In addition to 5 mg four times daily dosing. (Patient taking differently: Take 10 mg by mouth daily In addition to 5 mg four times daily dosing.), Disp: 30 tablet, Rfl: 0    promethazine (PHENERGAN) 25 MG tablet, TAKE 1 TABLET BY MOUTH 3 TIMES A DAY AS NEEDED FOR NAUSEA, Disp: 270 tablet, Rfl: 1     "propranolol (INDERAL) 40 MG tablet, Take 1 tablet (40 mg) by mouth 3 times daily, Disp: 270 tablet, Rfl: 3    rosuvastatin (CRESTOR) 20 MG tablet, Take 40 mg by mouth daily, Disp: , Rfl:     sertraline (ZOLOFT) 100 MG tablet, TAKE 2 TABLETS BY MOUTH DAILY (Patient taking differently: TAKE 2 TABLETS BY MOUTH DAILY), Disp: 180 tablet, Rfl: 3    triamterene-HCTZ (DYAZIDE) 37.5-25 MG capsule, Take 1 capsule by mouth every morning, Disp: 90 capsule, Rfl: 3    SOCIAL HABITS:    History   Smoking Status    Former    Types: Cigarettes   Smokeless Tobacco    Never     Social History    Substance and Sexual Activity      Alcohol use: Not Currently        Comment: - last drink was 3/28/14      History   Drug Use    Frequency: 2.0 times per week    Types: Marijuana     Comment: medical marijuana - vape daily       FAMILY HISTORY:    Family History   Problem Relation Age of Onset    Cancer Mother         lung    Breast Cancer Mother     Other Cancer Mother     Cancer Father         esophogeal, GERD    Snoring Father     Diabetes Brother         amputation, Type 1    Diabetes Brother     Diabetes Brother     Cancer - colorectal No family hx of     Glaucoma No family hx of     Macular Degeneration No family hx of     Anesthesia Reaction No family hx of     Venous thrombosis No family hx of        PE:  Pulse (!) 49   Ht 1.778 m (5' 10\")   Wt 89.8 kg (198 lb)   SpO2 98%   BMI 28.41 kg/m    Wt Readings from Last 1 Encounters:   05/06/24 89.8 kg (198 lb)     Body mass index is 28.41 kg/m .    EXAM:  GENERAL: alert and no distress, voice is calm  RESP: No audible wheeze, cough.    PSYCH: Appropriate affect, tone, and pace of words    DIAGNOSTIC STUDIES:     Images:  US Lower Extremity Arterial Duplex Bilateral    Result Date: 5/3/2024  Exam: Duplex ultrasound of bilateral lower extremity arteries dated 5/3/2024 11:01 AM Clinical information: PAD (peripheral artery disease) (H24); Postoperative state . Right SFA/pop stent placement " on 4/23/2024 Comparison: 3/28/2024 and 5/3/2024 Technique: Grayscale (B-mode), color Doppler, and duplex spectral Doppler ultrasound of the lower extremity arteries. Velocity measurements obtained with angle correction of 60 degrees or less. Ordering provider: RILEY NUNN Findings: Right lower extremity: EIA: Velocity: 98/10 cm/sec.  Waveforms: Multiphasic Common femoral artery: Velocity: 132 cm/s cm/sec. Waveforms: Multiphasic Profunda femoral artery: Velocity: 130 cm/s cm/sec. Waveforms: Multiphasic Proximal SFA : Velocity: 238 cm/s cm/sec. Waveforms: Multiphasic Mid to distal SFA (proximal to stent, proximal stent, mid stent, distal stent, and distal to stent): Velocity:  141/18, 130/17, 67/13, 55/11, and 58/15 cm/sec. Waveforms: Monophasic Popliteal artery, proximal: Velocity: 136/35 cm/sec. Waveforms: Monophasic Popliteal artery, distal: Velocity: 169/21 cm/sec. Waveforms: Monophasic PTA ankle: Velocity: 54 cm/s cm/sec. Waveforms: Monophasic RUDOLPH ankle: Velocity: 76 cm/s cm/sec. Waveforms: Monophasic Left lower extremity: EIA: Velocity: 103 cm/s cm/sec.  Waveforms: Multiphasic Common femoral artery: Velocity: 105 cm/s cm/sec. Waveforms: Multiphasic Deep femoral artery: Velocity: 165 cm/s cm/sec. Waveforms: Multiphasic Proximal SFA: Velocity: 91 cm/sec. Waveforms: Multiphasic Mid SFA: Velocity: 183 cm/s cm/sec. Waveforms: Multiphasic Distal SFA: Velocity: 143 cm/sec. Waveforms: Multiphasic Popliteal artery, proximal: Velocity: 72 cm/s cm/sec. Waveforms: Multiphasic Popliteal artery, distal: Velocity: 85 cm/sec. Waveforms: Multiphasic PTA ankle: Velocity: 67 cm/s cm/sec. Waveforms: Multiphasic RUDOLPH ankle: Velocity: 77 cm/s cm/sec. Waveforms: Multiphasic     Impression: 1. Right leg: Patent right mid to distal SFA stent with slight changes of the waveform from multiphasic to monophasic which could be related to distal vasodilatation. 2. Left leg: Patent left lower 70 arteries. Guidelines: [facility]  [place] duplex criteria for lower limb arterial occlusive disease Percent stenosis: Normal (1-19%): Peak systolic velocity (cm/s): <150, End-diastolic velocity (cm/s): <40, Velocity ratio (Vr): <1.5, Distal arterial waveform: Triphasic 20-49%: Peak systolic velocity (cm/s): 150-200, End-diastolic velocity (cm/s): <40, Velocity ratio (Vr): 1.5-2.0, Distal arterial waveform: Triphasic 50-75%: Peak systolic velocity (cm/s): 200-300, End-diastolic velocity (cm/s): <90, Velocity ratio (Vr): 2.0-3.9, Distal arterial waveform: Poststenotic turbulence distal to stenosis, monophasic distal waveform >75%: Peak systolic velocity (cm/s): >300, End-diastolic velocity (cm/s): <90, Velocity ratio (Vr): >4.0, Distal arterial waveform: Dampened distal waveform and low PSV/EDV* in the stenosis Occlusion: Absent flow by color Doppler/pulsed Doppler spectral analysis; length of occlusion estimated from distance between exit and reentry collateral arteries *PSV = peak systolic velocity, EDV = end-diastolic velocity http://link.[email or web address]/chapter/10.1007/[email or web address] html CINDY VAUGHAN MD   SYSTEM ID:  [id number]    US LIZZY Doppler No Exercise 1-2 Levels Bilateral    Result Date: 5/3/2024  Exam: Bilateral lower extremity resting ankle brachial indices dated 5/3/2024 11:01 AM Comparison study: None Clinical history: PAD (peripheral artery disease) (H24); Postoperative state Ordering provider: RILEY NUNN Technique: Bilateral lower extremity resting ankle brachial indices obtained. Findings: Right:  Arm: 152 mmHg  PT at ankle: 110 mmHg  DP at foot: 135 mmHg  LIZZY: 0.89 Left:  Arm: 146 mmHg  PT at ankle: 151 mmHg  DP at foot: 141 mmHg  LIZZY: 0.99     Impression: Right leg: Resting LIZZY is 0.89. Mild to moderately abnormal, 0.41-0.90. Left leg: Resting LIZZY is 0.99. Borderline (0.91 to 0.99). Guidelines: LIZZY Diagnostic Criteria (Based on criteria published in Circulation 2011; 124: 0487-6391):   > 1.4: Non compressible   1.00 -  1.40: Normal   0.91 - 0.99: Borderline   At or below 0.90: Abnormal LIZZY Diagnostic Criteria (Based on ACC/AHA guideline 2008):   >/=1.3 - non compressible vessels   1.00  -1.29 - Normal   0.91 - 0.99 - Borderline   0.41 - 0.90 - Mild to moderate PAD   0.00 - 0.40 - Severe PAD CINDY VAUGHAN MD   SYSTEM ID:  [id number]    Virtual Visit Details    Type of service:  Telephone Visit   Phone call duration: 30 minutes   Originating Location (pt. Location): Home    Distant Location (provider location):  On-site    [facility]      Type of Visit: Virtual: Telephone    Patient is here for a return visit to discuss US done 5/3. 2 wk s/p RLE angio .     Vitals - Patient Reported  Pain Score: Extreme Pain (9)  Pain Loc: Other - see comment (foot)      Questions patient would like addressed today are: NA, will discuss any at appt time    Refills are needed: No    How would you like to obtain your AVS? Pancho Henley MA        Again, thank you for allowing me to participate in the care of your patient.      Sincerely,    VANCE Todd CNP

## 2024-05-07 ENCOUNTER — MYC MEDICAL ADVICE (OUTPATIENT)
Dept: VASCULAR SURGERY | Facility: CLINIC | Age: 64
End: 2024-05-07

## 2024-05-07 ENCOUNTER — APPOINTMENT (OUTPATIENT)
Dept: ULTRASOUND IMAGING | Facility: CLINIC | Age: 64
DRG: 475 | End: 2024-05-07
Attending: STUDENT IN AN ORGANIZED HEALTH CARE EDUCATION/TRAINING PROGRAM
Payer: MEDICARE

## 2024-05-07 ENCOUNTER — HOSPITAL ENCOUNTER (EMERGENCY)
Facility: CLINIC | Age: 64
Discharge: HOME | DRG: 475 | End: 2024-05-07
Attending: STUDENT IN AN ORGANIZED HEALTH CARE EDUCATION/TRAINING PROGRAM | Admitting: STUDENT IN AN ORGANIZED HEALTH CARE EDUCATION/TRAINING PROGRAM
Payer: MEDICARE

## 2024-05-07 VITALS
TEMPERATURE: 97.9 F | SYSTOLIC BLOOD PRESSURE: 198 MMHG | RESPIRATION RATE: 20 BRPM | OXYGEN SATURATION: 98 % | DIASTOLIC BLOOD PRESSURE: 83 MMHG | HEART RATE: 59 BPM

## 2024-05-07 DIAGNOSIS — M79.89 RIGHT LEG SWELLING: ICD-10-CM

## 2024-05-07 DIAGNOSIS — M79.604 RIGHT LEG PAIN: ICD-10-CM

## 2024-05-07 PROCEDURE — 250N000013 HC RX MED GY IP 250 OP 250 PS 637: Performed by: STUDENT IN AN ORGANIZED HEALTH CARE EDUCATION/TRAINING PROGRAM

## 2024-05-07 PROCEDURE — 76882 US LMTD JT/FCL EVL NVASC XTR: CPT | Mod: 26 | Performed by: STUDENT IN AN ORGANIZED HEALTH CARE EDUCATION/TRAINING PROGRAM

## 2024-05-07 PROCEDURE — 93971 EXTREMITY STUDY: CPT | Mod: RT

## 2024-05-07 PROCEDURE — 93971 EXTREMITY STUDY: CPT | Mod: 26 | Performed by: RADIOLOGY

## 2024-05-07 PROCEDURE — 99284 EMERGENCY DEPT VISIT MOD MDM: CPT | Mod: 25 | Performed by: STUDENT IN AN ORGANIZED HEALTH CARE EDUCATION/TRAINING PROGRAM

## 2024-05-07 PROCEDURE — 76882 US LMTD JT/FCL EVL NVASC XTR: CPT | Mod: RT | Performed by: STUDENT IN AN ORGANIZED HEALTH CARE EDUCATION/TRAINING PROGRAM

## 2024-05-07 RX ORDER — CYCLOBENZAPRINE HCL 5 MG
10 TABLET ORAL ONCE
Status: COMPLETED | OUTPATIENT
Start: 2024-05-07 | End: 2024-05-07

## 2024-05-07 RX ORDER — OXYCODONE HYDROCHLORIDE 10 MG/1
10 TABLET ORAL ONCE
Status: DISCONTINUED | OUTPATIENT
Start: 2024-05-07 | End: 2024-05-07

## 2024-05-07 RX ADMIN — CYCLOBENZAPRINE 10 MG: 5 TABLET, FILM COATED ORAL at 17:34

## 2024-05-07 RX ADMIN — OXYCODONE HYDROCHLORIDE 15 MG: 5 TABLET ORAL at 17:34

## 2024-05-07 ASSESSMENT — COLUMBIA-SUICIDE SEVERITY RATING SCALE - C-SSRS
1. IN THE PAST MONTH, HAVE YOU WISHED YOU WERE DEAD OR WISHED YOU COULD GO TO SLEEP AND NOT WAKE UP?: NO
6. HAVE YOU EVER DONE ANYTHING, STARTED TO DO ANYTHING, OR PREPARED TO DO ANYTHING TO END YOUR LIFE?: NO
2. HAVE YOU ACTUALLY HAD ANY THOUGHTS OF KILLING YOURSELF IN THE PAST MONTH?: NO

## 2024-05-07 ASSESSMENT — ACTIVITIES OF DAILY LIVING (ADL)
ADLS_ACUITY_SCORE: 38
ADLS_ACUITY_SCORE: 36
ADLS_ACUITY_SCORE: 38
ADLS_ACUITY_SCORE: 38

## 2024-05-07 NOTE — PATIENT INSTRUCTIONS
Thank you so much for choosing us for your care. It was a pleasure to see you at the vascular clinic today.     Follow-up recommendations: We will see you back in 3 months. Please do not hesitate to reach out to us in the meantime with any concerns. Our schedulers will get in touch with you to set up your follow-up visit.     Additional testing/imaging ordered today: Repeat arterial duplex in 3 months to assess patency of right femoral stent        Our scheduling team will get in touch with you to set up any follow-up testing/imaging and/or appointments. Please be aware that any testing/imaging recommended today will need to completed prior to your next visit with the provider. If testing/imaging is not completed prior to your next visit, your visit may be rescheduled.        If you have any questions, please contact our clinic directly at [phone] and ask for the nurse. We also encourage the use of "[employer] to communicate with your HealthCare Provider.        If you have an urgent need after business hours (8:00 am to 4:30 pm) please call [phone], option 4, and ask for the vascular attending on call. For non-urgent after hours needs, please call the vascular nurse at [phone] and leave a detailed voicemail. For scheduling needs, please call our clinic directly at [phone].

## 2024-05-07 NOTE — ED PROVIDER NOTES
"    [place] EMERGENCY DEPARTMENT ([facility])    5/07/24       ED PROVIDER NOTE  VTA      History     Chief Complaint   Patient presents with    Leg Pain     HPI  Erwin Aguilar is a 63 year old male with a past medical history significant for PAD (right SFA 75% stenosis), right foot neuropathic pain and nonhealing achilles tendon injury who presents to the Emergency Department for evaluation of right leg pain.  Patient states he woke up this morning and his right leg felt very swollen and tight.  He states this feeling is \"very strange\" and he is unsure what could be wrong.\" He denies fevers, chills, chest pain or shortness of breath.  At baseline patient cannot feel anything in his legs below his butt.  He states he has a wound near his Achilles tendon after failed surgery 5 years ago.  He states his wound has never fully closed and recently opened up again. He states 1 month ago he was planning on an AKA however, prior to this procedure the doctor told him he could possibly have a chance at a BKA instead if they are able to optimize his circulation. He states he has been speaking with his vascular surgeon about this and is now planning on a BKA on 5/9.     Past Medical History  Past Medical History:   Diagnosis Date    Actinic keratosis     aldara    Anxiety     Cancer (H)     squamous cell skin CA    Cauda equina spinal cord injury (H)     Chronic sinusitis 5-1-16    Depressive disorder     Diastasis recti     Esophageal reflux     Esophageal varices in cirrhosis (H) 4/1/2014    Hospitalized for UGI blee 3/28/14, endoscopy revealed bleeding varices.    Essential hypertension, benign     Intermittent asthma     Mild depression     Mixed hyperlipidemia     Nasal polyps 5-1-16    Osteoarthritis of lumbar spine, unspecified spinal osteoarthritis complication status     Other chronic pain     Paroxysmal atrial fibrillation (H) 9/22/2021    SCCA (squamous cell carcinoma) of skin     Seasonal allergic " conjunctivitis     Type II or unspecified type diabetes mellitus without mention of complication, not stated as uncontrolled     Unspecified site of spinal cord injury without evidence of spinal bone injury     due to back surgery     Past Surgical History:   Procedure Laterality Date    ABDOMEN SURGERY  2014    ANGIOGRAM Right 4/23/2024    Procedure: Ultrasound guided percutaneous transarterial left common femoral artery access, diagnostic aortoiliac angiogram, selective cannulation of right comon iliac, righ external iliac, right common femoral, and right superficial femoral artery, balloon angioplasty of right superficial femoral artery, 6mm x 12 cm self-expanding drug-coated stent placement of right superficial femoral artery, lef    BACK SURGERY  August 2009    COLONOSCOPY N/A 5/12/2016    Procedure: COMBINED COLONOSCOPY, SINGLE OR MULTIPLE BIOPSY/POLYPECTOMY BY BIOPSY;  Surgeon: Ana Paula Urbina MD;  Location:  GI    DECOMPRESSION CUBITAL TUNNEL Left 8/31/2023    Procedure: LEFT CUBITAL TUNNEL RELEASE,;  Surgeon: Deny Dixon MD;  Location: [facility] OR    ENDOSCOPY UPPER, COLONOSCOPY, COMBINED  10/19/2011    Procedure:COMBINED ENDOSCOPY UPPER, COLONOSCOPY; Upper Endoscopy, Colonoscopy with Polypectomy x4; Surgeon:AMBAR RODRÍGUEZIQ; Location: OR    ENT SURGERY  1-2016    Ongoing since then    ESOPHAGOSCOPY, GASTROSCOPY, DUODENOSCOPY (EGD), COMBINED  3/28/2014    Procedure: COMBINED ESOPHAGOSCOPY, GASTROSCOPY, DUODENOSCOPY (EGD);  EGD, Gastric Biopsies, Esophageal Banding;  Surgeon: Reyna Tovar MD;  Location:  OR    ESOPHAGOSCOPY, GASTROSCOPY, DUODENOSCOPY (EGD), COMBINED  6/9/2014    Procedure: COMBINED ESOPHAGOSCOPY, GASTROSCOPY, DUODENOSCOPY (EGD);  Surgeon: Curtis Mendez MD;  Location:  GI    ESOPHAGOSCOPY, GASTROSCOPY, DUODENOSCOPY (EGD), COMBINED  7/24/2014    Procedure: COMBINED ESOPHAGOSCOPY, GASTROSCOPY, DUODENOSCOPY (EGD);  Surgeon: Gerard Samaniego MD;  Location:  OR     ESOPHAGOSCOPY, GASTROSCOPY, DUODENOSCOPY (EGD), COMBINED N/A 10/31/2014    Procedure: COMBINED ESOPHAGOSCOPY, GASTROSCOPY, DUODENOSCOPY (EGD);  Surgeon: Gerard Samaniego MD;  Location: [facility] OR    ESOPHAGOSCOPY, GASTROSCOPY, DUODENOSCOPY (EGD), COMBINED N/A 5/12/2016    Procedure: COMBINED ESOPHAGOSCOPY, GASTROSCOPY, DUODENOSCOPY (EGD);  Surgeon: Ana Paula Urbina MD;  Location: [facility] GI    ESOPHAGOSCOPY, GASTROSCOPY, DUODENOSCOPY (EGD), COMBINED N/A 8/2/2018    Procedure: COMBINED ESOPHAGOSCOPY, GASTROSCOPY, DUODENOSCOPY (EGD);  EGD;  Surgeon: Yu Wagner MD;  Location: [facility] GI    HCL SQUAMOUS CELL CARCINOMA AG  10/07    left forearm    HERNIORRHAPHY UMBILICAL  11/8/2012    Procedure: HERNIORRHAPHY UMBILICAL;  Open Umbilical Hernia Repair With Mesh ;  Surgeon: Chase Nicholson MD;  Location: [facility] OR    INSERT STIMULATOR DORSAL COLUMN N/A 6/28/2018    Procedure: INSERT STIMULATOR DORSAL COLUMN;;  Surgeon: Elvis Sauceda MD;  Location: [facility] OR    IR LOWER EXTREMITY ANGIOGRAM RIGHT  4/23/2024    neuro stimulator  2010    RELEASE CARPAL TUNNEL Left 8/31/2023    Procedure: LEFT OPEN CARPAL TUNNEL RELEASE,;  Surgeon: Deny Dixon MD;  Location: [facility] OR    RELEASE TRIGGER FINGER Left 8/31/2023    Procedure: Release left trigger finger thumb;  Surgeon: Deny Dixon MD;  Location: [facility] OR    REMOVE GENERATOR STIMULATOR (LOCATION) N/A 6/28/2018    Procedure: REMOVE GENERATOR STIMULATOR (LOCATION);  Spinal Cord Stimulator and IPG Explant and Re-Implant of SCS System (Leads and IPG);  Surgeon: Elvis Sauecda MD;  Location: [facility] OR    REPAIR TENDON ACHILLES Right 11/11/2020    Procedure: Right achilles debridement and partial calcaneus excision;  Surgeon: Eh Sandoval MD;  Location: [facility] OR    SURGICAL HISTORY OF -   1/02    abcess tooth    SURGICAL HISTORY OF -   1999    L4-5 laminectomy, cauda equina syndrome    SURGICAL HISTORY OF -   +    herniated  disk repair    TONSILLECTOMY  10 1964    TRANSPOSITION ULNAR NERVE (ELBOW)      right    ZZC APPENDECTOMY  1974     ACE/ARB NOT PRESCRIBED, INTENTIONAL,  albuterol (PROAIR HFA/PROVENTIL HFA/VENTOLIN HFA) 108 (90 Base) MCG/ACT inhaler  apixaban ANTICOAGULANT (ELIQUIS ANTICOAGULANT) 5 MG tablet  aspirin 81 MG EC tablet  baclofen (LIORESAL) 10 MG tablet  Continuous Blood Gluc  (FREESTYLE CACHORRO 2 READER) GASTON  doxazosin (CARDURA) 8 MG tablet  gabapentin (NEURONTIN) 300 MG capsule  insulin glargine (LANTUS SOLOSTAR) 100 UNIT/ML pen  insulin pen needle (GLOBAL EASE INJECT PEN NEEDLES) 32G X 4 MM miscellaneous  medical cannabis (Patient's own supply)  oxyCODONE IR (ROXICODONE) 10 MG tablet  promethazine (PHENERGAN) 25 MG tablet  propranolol (INDERAL) 40 MG tablet  rosuvastatin (CRESTOR) 20 MG tablet  sertraline (ZOLOFT) 100 MG tablet  triamterene-HCTZ (DYAZIDE) 37.5-25 MG capsule      Allergies   Allergen Reactions    Levaquin Anaphylaxis and Rash     Swelling in lip and tongue felt thick    Lisinopril Anaphylaxis     Swollen tongue; inability to swallow; drooling; hives; swollen face, neck, angioedema    Acetaminophen      Hx of cirrhosis     Amlodipine      Swelling, hives, possible angioedema      Morphine      b/p dropped and arms went numb  Hypotension    Quinolones Hives    Spironolactone Unknown     Pt believes himself to be allergic to it; cannot find his old records of this.-mjc     Bactrim [Sulfamethoxazole-Trimethoprim] Rash     Family History  Family History   Problem Relation Age of Onset    Cancer Mother         lung    Breast Cancer Mother     Other Cancer Mother     Cancer Father         esophogeal, GERD    Snoring Father     Diabetes Brother         amputation, Type 1    Diabetes Brother     Diabetes Brother     Cancer - colorectal No family hx of     Glaucoma No family hx of     Macular Degeneration No family hx of     Anesthesia Reaction No family hx of     Venous thrombosis No family hx of       Social History   Social History     Tobacco Use    Smoking status: Former     Current packs/day: 0.00     Types: Cigarettes     Start date: 10/13/1983     Quit date: 1984     Years since quittin.9     Passive exposure: Past    Smokeless tobacco: Never   Vaping Use    Vaping status: Every Day    Substances: THC, CBD    Devices: [employer] Expeditionble tank   Substance Use Topics    Alcohol use: Not Currently     Comment: - last drink was 3/28/14    Drug use: Yes     Frequency: 2.0 times per week     Types: Marijuana     Comment: medical marijuana - vape daily      Past medical history, past surgical history, medications, allergies, family history, and social history were reviewed with the patient. No additional pertinent items.      A medically appropriate review of systems was performed with pertinent positives and negatives noted in the HPI, and all other systems negative.    Physical Exam   BP: (!) 198/83  Pulse: 59  Temp: 97.9  F (36.6  C)  Resp: 20  SpO2: 98 %  Physical Exam  Vital Signs Reviewed  Gen: Well nourished, well developed, resting comfortably, no acute distress  HEENT: NC/AT, PERRL, EOMI, MMM  Neck: Supple, FROM  CV: Regular Rate, no murmur/rub/gallop  Lungs/Chest: Normal Effort, CTAB  Abd: Non-distended, non-tender  MSK/Back: FROM, no visible deformity  Neuro: A&Ox3, GCS 15, CN II-XII unremarkable  Skin: Warm, Dry, Intact, no visible lesions    ED Course, Procedures, & Data     ED Course as of 05/07/24 2321   Tue May 07, 2024   1654 Informed by nursing that patient is declining labs, pain medications, and ultrasound until vascular surgery comes and sees him. He is aware of risks of delayed workup.   1701 No first call in paging system. Second page sent to vascular second call.   1730 Discussed with Dr. Blue. Recommends only venous study based on information provided.    Vascular resident will be available to lay eyes on patient as well. Dr. Blue at [facility], Fellow in case.   1731 Patient  agreeable to pain medication and US venous.   1903 No DVT on prelim although calf veins not completely imaged. Recs from vascular pending.   Final Vascular Recs- without definitive DVT, do not anticoagulate. Follow up with orthopedics.    Procedures  Results for orders placed during the hospital encounter of 05/07/24    POC US FOR DVT UNILATERAL LIMITED    Impression  Limited bedside ultrasound for deep venous thrombosis assessment, performed and interpreted by me.  Indication: LE swelling and LE pain  Compression ultrasonography was examined using a standardized 2 area compression technique.  The right LE was examined.    Examination of the common femoral vein began just above the common femoral saphenous junction. Staggered compression was performed every cm beyond the point of the deep femoral and superficial femoral junction, evaluating for complete coaptation of the vessel. The ultrasound probe was then moved to the popliteal space where the proximal popliteal vein was similarly examined beyond the point of trifurcation. The calf veins were not examined.    Findings Entire venous system as outlined above was easily compressed to complete coaptation.  IMPRESSION: No evidence of DVT in the right lower extremity(S).              Results for orders placed or performed during the hospital encounter of 05/07/24   POC US FOR DVT UNILATERAL LIMITED     Status: None    Impression    Limited bedside ultrasound for deep venous thrombosis assessment, performed and interpreted by me.   Indication: LE swelling and LE pain  Compression ultrasonography was examined using a standardized 2 area compression technique.  The right LE was examined.    Examination of the common femoral vein began just above the common femoral saphenous junction. Staggered compression was performed every cm beyond the point of the deep femoral and superficial femoral junction, evaluating for complete coaptation of the vessel. The ultrasound probe was  then moved to the popliteal space where the proximal popliteal vein was similarly examined beyond the point of trifurcation. The calf veins were not examined.    Findings Entire venous system as outlined above was easily compressed to complete coaptation.   IMPRESSION: No evidence of DVT in the right lower extremity(S).       US Lower Extremity Venous Duplex Right     Status: None    Narrative    EXAMINATION: DOPPLER VENOUS ULTRASOUND OF THE RIGHT LOWER EXTREMITY,  5/7/2024 6:02 PM     COMPARISON: US right lower extremity 10/11/2015.    HISTORY: Right calf pain and swelling, rule out DVT.    TECHNIQUE:  Gray-scale evaluation with compression, spectral flow, and  color Doppler assessment of the deep venous system of the right leg  from groin to knee, and then at the ankle.    FINDINGS:  In the right lower extremity, the common femoral, femoral (with  duplication), popliteal and posterior tibial veins demonstrate normal  compressibility and blood flow. The right posterior tibial vein is  fully collapsible. The right peroneal vein is not visualized.      Impression    IMPRESSION:  1.  No evidence of right lower extremity deep venous thrombosis.    I have personally reviewed the examination and initial interpretation  and I agree with the findings.    ACACIA SIM MD         SYSTEM ID:  [id number]     Medications   oxyCODONE (ROXICODONE) tablet 15 mg (15 mg Oral $Given 5/7/24 1734)   cyclobenzaprine (FLEXERIL) tablet 10 mg (10 mg Oral $Given 5/7/24 1734)     Labs Ordered and Resulted from Time of ED Arrival to Time of ED Departure - No data to display  US Lower Extremity Venous Duplex Right   Final Result   IMPRESSION:   1.  No evidence of right lower extremity deep venous thrombosis.      I have personally reviewed the examination and initial interpretation   and I agree with the findings.      ACACIA SIM MD            SYSTEM ID:  [id number]      POC US FOR DVT UNILATERAL LIMITED   Final Result   Limited  bedside ultrasound for deep venous thrombosis assessment, performed and interpreted by me.    Indication: LE swelling and LE pain   Compression ultrasonography was examined using a standardized 2 area compression technique.   The right LE was examined.      Examination of the common femoral vein began just above the common femoral saphenous junction. Staggered compression was performed every cm beyond the point of the deep femoral and superficial femoral junction, evaluating for complete coaptation of the vessel. The ultrasound probe was then moved to the popliteal space where the proximal popliteal vein was similarly examined beyond the point of trifurcation. The calf veins were not examined.      Findings Entire venous system as outlined above was easily compressed to complete coaptation.    IMPRESSION: No evidence of DVT in the right lower extremity(S).                   Critical care was not performed.     Medical Decision Making  The patient's presentation was of high complexity (an acute health issue posing potential threat to life or bodily function).    The patient's evaluation involved:  ordering and/or review of 3+ test(s) in this encounter (see separate area of note for details)  discussion of management or test interpretation with another health professional (Vascular Surgery)    The patient's management necessitated moderate risk (prescription drug management including medications given in the ED).    Assessment & Plan    Erwin Aguilar is a 63 year old male with a past medical history significant for PAD (right SFA 75% stenosis), right foot neuropathic pain and nonhealing achilles tendon injury who presents to the Emergency Department for evaluation of right leg pain.  Patient states he woke up this morning and his right leg felt very swollen and tight.  On arrival patient had elevated blood pressures in the setting of acute pain.  He was not having any cardiopulmonary symptoms.  Afebrile with  otherwise reassuring vital signs.  Extremity is reassuring on exam in terms of acute ischemia.  Toes appear well-perfused.  Pulse was difficult to palpate but I did get good Doppler signal while doing his DVT ultrasound.  Bedside ultrasound with no evidence of acute DVT.  Suspicion to be this to be in the smaller calf and so a formal ultrasound was ordered.  I also ordered some labs and arterial imaging.  I was informed by nursing staff the patient declined these.  He wished for me to speak with vascular surgery/be seen by vascular surgery first.  Patient had to be seen through the vertical triage pathway due to departmental capacity tonight.  We explained the reasons behind this and that it was being utilized as an alternative to delayed evaluation while waiting for a room.  Hallway bed was offered but declined by patient.    I was able to speak with the vascular surgery attending who recommended only venous ultrasonography.  Treatment would be at our discretion and would need to be modified depending on patient's upcoming orthopedic procedure.    Patient was also seen by the vascular surgery resident, appreciate his assistance in speaking with and evaluated patient recommending treatment plans.    Ultrasound read with no evidence of DVT although calf veins were incompletely visualized.  I discussed this with vascular surgery service who are recommending avoiding anticoagulation at this time.  Patient will continue to follow-up with orthopedic surgery regarding his scheduled amputation.    I did discuss the possibility of a blood clot that was not visualized and the need to monitor his symptoms.  I recommended that if patient develops a sending symptoms into his thigh he should return to the emergency department for repeat ultrasound.    Patient was eager to discharge, while discussing discharge she did not wish to wait for discharge paperwork.  He verbalized his understanding of the workup today recommendations  and return precautions and had no further questions comments or concerns.    Discharge Medication List as of 5/7/2024  7:53 PM          Final diagnoses:   Right leg swelling   Right leg pain   ILINDA, am serving as a trained medical scribe to document services personally performed by Tom Chaap MD, based on the provider's statements to me.      ITom MD, was physically present and have reviewed and verified the accuracy of this note documented by LINDA LOVE.     Tom Chapa Jr., MD   [facility] EMERGENCY DEPARTMENT  5/7/2024        Tom Chapa MD  05/07/24 7901

## 2024-05-07 NOTE — CONSULTS
"Vascular Surgery Consult Note  05/07/2024    Reason for consult: Swelling and discoloration after SFA stent  Consult requested by: PATRICE      ASSESSMENT: 63M with recent R SFA stenting for nonhealing wound now presenting with several hours of R calf swelling, bluish discoloration, and pain. He is off of his PTA Eliquis in anticipation of R BKA on 5/9. Presumed DVT, will get venous duplex to confirm diagnosis.    RECOMMENDATIONS:    - If DVT is seen on formal venous duplex, recommend anticoagulation per ED  - Recommend discussion between medicine and orthopedics regarding plan for anticoagulation if this indeed a DVT, as patient is currently scheduled for BKA on 5/9 with ortho  - No vascular surgery intervention recommended, Vascular Surgery will sign off at this time      Discussed with staff Dr. Mirza Peres MD  Surgery Resident      =======================    History of Present Illness:    Erwin Aguilar is a 63 year old male with hx of R foot drop after previous spine operation, leading to calcaneal tendonitis s/p partial calcaneous excision in 2020 c/b nonhealing wound. Patient was previously planned for BKA, but surgery was deferred due to concern wound healing 2/2 severe distal SFA stenosis. He is now s/p SFA stenting on 4/23 with follow-up duplex and LIZZY showing significant improvement. He presents today with new swelling and discoloration of his right calf, which he discovered after he woke up from a nap. The pain is around the discolored area on his posterior calf, and extends anteriorly. He describes the pain like a \"berkley-horse\". He has not had any similar episodes in the past. He denies any fevers, chills, shortness of breath, chest pain, or abdominal pain. He is currently off of his Eliquis prior to surgery on 5/9.    Past Medical History:  Past Medical History:   Diagnosis Date    Actinic keratosis     aldara    Anxiety     Cancer (H)     squamous cell skin CA    Cauda equina " spinal cord injury (H)     Chronic sinusitis 5-1-16    Depressive disorder     Diastasis recti     Esophageal reflux     Esophageal varices in cirrhosis (H) 4/1/2014    Hospitalized for UGI blee 3/28/14, endoscopy revealed bleeding varices.    Essential hypertension, benign     Intermittent asthma     Mild depression     Mixed hyperlipidemia     Nasal polyps 5-1-16    Osteoarthritis of lumbar spine, unspecified spinal osteoarthritis complication status     Other chronic pain     Paroxysmal atrial fibrillation (H) 9/22/2021    SCCA (squamous cell carcinoma) of skin     Seasonal allergic conjunctivitis     Type II or unspecified type diabetes mellitus without mention of complication, not stated as uncontrolled     Unspecified site of spinal cord injury without evidence of spinal bone injury     due to back surgery       Past Surgical History  Past Surgical History:   Procedure Laterality Date    ABDOMEN SURGERY  2014    ANGIOGRAM Right 4/23/2024    Procedure: Ultrasound guided percutaneous transarterial left common femoral artery access, diagnostic aortoiliac angiogram, selective cannulation of right comon iliac, righ external iliac, right common femoral, and right superficial femoral artery, balloon angioplasty of right superficial femoral artery, 6mm x 12 cm self-expanding drug-coated stent placement of right superficial femoral artery, lef    BACK SURGERY  August 2009    COLONOSCOPY N/A 5/12/2016    Procedure: COMBINED COLONOSCOPY, SINGLE OR MULTIPLE BIOPSY/POLYPECTOMY BY BIOPSY;  Surgeon: Ana Paula Urbina MD;  [facility]    DECOMPRESSION CUBITAL TUNNEL Left 8/31/2023    Procedure: LEFT CUBITAL TUNNEL RELEASE,;  Surgeon: Deny Dixon MD;  [place] [facility] [place]    ENDOSCOPY UPPER, COLONOSCOPY, COMBINED  10/19/2011    Procedure:COMBINED ENDOSCOPY UPPER, COLONOSCOPY; Upper Endoscopy, Colonoscopy with Polypectomy x4; Surgeon:AMBAR RODRÍGUEZ; Location:[facility]    ENT SURGERY  1-2016    Ongoing since then     ESOPHAGOSCOPY, GASTROSCOPY, DUODENOSCOPY (EGD), COMBINED  3/28/2014    Procedure: COMBINED ESOPHAGOSCOPY, GASTROSCOPY, DUODENOSCOPY (EGD);  EGD, Gastric Biopsies, Esophageal Banding;  Surgeon: Reyna Tovar MD;  [facility]    ESOPHAGOSCOPY, GASTROSCOPY, DUODENOSCOPY (EGD), COMBINED  6/9/2014    Procedure: COMBINED ESOPHAGOSCOPY, GASTROSCOPY, DUODENOSCOPY (EGD);  Surgeon: Curtis Mendez MD;  [facility]    ESOPHAGOSCOPY, GASTROSCOPY, DUODENOSCOPY (EGD), COMBINED  7/24/2014    Procedure: COMBINED ESOPHAGOSCOPY, GASTROSCOPY, DUODENOSCOPY (EGD);  Surgeon: Gerard Samaniego MD;  [facility]    ESOPHAGOSCOPY, GASTROSCOPY, DUODENOSCOPY (EGD), COMBINED N/A 10/31/2014    Procedure: COMBINED ESOPHAGOSCOPY, GASTROSCOPY, DUODENOSCOPY (EGD);  Surgeon: Gerard Samaniego MD;  [facility]    ESOPHAGOSCOPY, GASTROSCOPY, DUODENOSCOPY (EGD), COMBINED N/A 5/12/2016    Procedure: COMBINED ESOPHAGOSCOPY, GASTROSCOPY, DUODENOSCOPY (EGD);  Surgeon: Ana Paula Urbina MD;  [facility]    ESOPHAGOSCOPY, GASTROSCOPY, DUODENOSCOPY (EGD), COMBINED N/A 8/2/2018    Procedure: COMBINED ESOPHAGOSCOPY, GASTROSCOPY, DUODENOSCOPY (EGD);  EGD;  Surgeon: Yu Wagner MD;  [facility]: [facility]    HCL SQUAMOUS CELL CARCINOMA AG  10/07    left forearm    HERNIORRHAPHY UMBILICAL  11/8/2012    Procedure: HERNIORRHAPHY UMBILICAL;  Open Umbilical Hernia Repair With Mesh ;  Surgeon: Chase Nicholson MD;  Location: [facility]    INSERT STIMULATOR DORSAL COLUMN N/A 6/28/2018    Procedure: INSERT STIMULATOR DORSAL COLUMN;;  Surgeon: Elvis Sauceda MD;  [facility]    IR LOWER EXTREMITY ANGIOGRAM RIGHT  4/23/2024    neuro stimulator  2010    RELEASE CARPAL TUNNEL Left 8/31/2023    Procedure: LEFT OPEN CARPAL TUNNEL RELEASE,;  Surgeon: Deny Dixon MD;  [facility]    RELEASE TRIGGER FINGER Left 8/31/2023    Procedure: Release left trigger finger thumb;  Surgeon: Deny Dixon MD;   Location: [facility]    REMOVE GENERATOR STIMULATOR (LOCATION) N/A 6/28/2018    Procedure: REMOVE GENERATOR STIMULATOR (LOCATION);  Spinal Cord Stimulator and IPG Explant and Re-Implant of SCS System (Leads and IPG);  Surgeon: Elvis Sauceda MD;  [facility]    REPAIR TENDON ACHILLES Right 11/11/2020    Procedure: Right achilles debridement and partial calcaneus excision;  Surgeon: Eh Sandoval MD;  Location: [facility]    SURGICAL HISTORY OF -   1/02    abcess tooth    SURGICAL HISTORY OF -   1999    L4-5 laminectomy, cauda equina syndrome    SURGICAL HISTORY OF -   +    herniated disk repair    TONSILLECTOMY  10 1964    TRANSPOSITION ULNAR NERVE (ELBOW)      right    ZZC APPENDECTOMY  1974       Family History:  Family History   Problem Relation Age of Onset    Cancer Mother         lung    Breast Cancer Mother     Other Cancer Mother     Cancer Father         esophogeal, GERD    Snoring Father     Diabetes Brother         amputation, Type 1    Diabetes Brother     Diabetes Brother     Cancer - colorectal No family hx of     Glaucoma No family hx of     Macular Degeneration No family hx of     Anesthesia Reaction No family hx of     Venous thrombosis No family hx of        Social History:  Social History     Social History Narrative    Not on file        Medications:  Current Outpatient Medications   Medication Sig Dispense Refill    ACE/ARB NOT PRESCRIBED, INTENTIONAL, ACE & ARB not prescribed due to Allergy      albuterol (PROAIR HFA/PROVENTIL HFA/VENTOLIN HFA) 108 (90 Base) MCG/ACT inhaler INHALE 2 PUFFS BY MOUTH EVERY 6 HOURS AS NEEDED FOR SHORTNESS OF BREATH OR WHEEZING 18 g 1    apixaban ANTICOAGULANT (ELIQUIS ANTICOAGULANT) 5 MG tablet Take 1 tablet (5 mg) by mouth 2 times daily 180 tablet 3    aspirin 81 MG EC tablet Take 1 tablet (81 mg) by mouth daily for 360 days 90 tablet 3    baclofen (LIORESAL) 10 MG tablet TAKE 2 TABLETS BY MOUTH 3 TIMES A  tablet 3    Continuous  Blood Gluc  (FREESTYLE CACHORRO 2 READER) GASTON USE TO READ BLOOD SUGARS AS PER 'S INSTRUCTIONS 1 each 0    doxazosin (CARDURA) 8 MG tablet Take 1 tablet (8 mg) by mouth at bedtime 90 tablet 1    gabapentin (NEURONTIN) 300 MG capsule Take 300 mg by mouth 2 times daily Take 600 mg by mouth in the afternoon and 300 mg at bedtime      insulin glargine (LANTUS SOLOSTAR) 100 UNIT/ML pen INJECT 26 UNITS SUBCUTANEOUSLY AT BEDTIME (Patient taking differently: Inject 26 Units Subcutaneous at bedtime INJECT 26 UNITS SUBCUTANEOUSLY AT BEDTIME) 15 mL 3    insulin pen needle (GLOBAL EASE INJECT PEN NEEDLES) 32G X 4 MM miscellaneous USE AS DIRECTED EVERY  each 3    medical cannabis (Patient's own supply) See Admin Instructions (The purpose of this order is to document that the patient reports taking medical cannabis.  This is not a prescription, and is not used to certify that the patient has a qualifying medical condition.)   Flower - PRN      oxyCODONE IR (ROXICODONE) 10 MG tablet Take 1 tablet (10 mg) by mouth 3 times daily as needed for severe pain In addition to 5 mg four times daily dosing. 90 tablet 0    promethazine (PHENERGAN) 25 MG tablet TAKE 1 TABLET BY MOUTH 3 TIMES A DAY AS NEEDED FOR NAUSEA 270 tablet 1    propranolol (INDERAL) 40 MG tablet Take 1 tablet (40 mg) by mouth 3 times daily 270 tablet 3    rosuvastatin (CRESTOR) 20 MG tablet Take 40 mg by mouth daily      sertraline (ZOLOFT) 100 MG tablet TAKE 2 TABLETS BY MOUTH DAILY (Patient taking differently: TAKE 2 TABLETS BY MOUTH DAILY) 180 tablet 3    triamterene-HCTZ (DYAZIDE) 37.5-25 MG capsule Take 1 capsule by mouth every morning 90 capsule 3       Allergies:  Allergies   Allergen Reactions    Levaquin Anaphylaxis and Rash     Swelling in lip and tongue felt thick    Lisinopril Anaphylaxis     Swollen tongue; inability to swallow; drooling; hives; swollen face, neck, angioedema    Acetaminophen      Hx of cirrhosis     Amlodipine       Swelling, hives, possible angioedema      Morphine      b/p dropped and arms went numb  Hypotension    Quinolones Hives    Spironolactone Unknown     Pt believes himself to be allergic to it; cannot find his old records of this.-mjc     Bactrim [Sulfamethoxazole-Trimethoprim] Rash       Review of Symptoms:  A 10 point review of symptoms has been conducted and is negative except for that mentioned in the above HPI.    Physical Exam:    Temp:  [97.9  F (36.6  C)] 97.9  F (36.6  C)  Pulse:  [59] 59  Resp:  [20] 20  BP: (198)/(83) 198/83  SpO2:  [98 %] 98 %    Gen: NAD, resting comfortably  HEENT: NCAT, sclera anicteric  CV: RRR by pedal signal  Resp: nonlabored respirations, comfortable on room air  Abd: soft, nontender, nondistended   Ext: WWP w/o edema. Blue/black discoloration on R posterior calf with localized tenderness. Positive jeanette sign. Strong monophasic R DP and PT  Neuro: no sensation/motor function in R foot  Skin: No rashes    Labs:  Pending    Imaging:  Venous duplex pending

## 2024-05-07 NOTE — ED TRIAGE NOTES
BIBA with right leg swelling and pain. Per patient he took a nap and woke up with swelling. He is scheduled to have right leg amputation Thursday.     Triage Assessment (Adult)       Row Name 05/07/24 1600          Triage Assessment    Airway WDL WDL        Respiratory WDL    Respiratory WDL WDL        Skin Circulation/Temperature WDL    Skin Circulation/Temperature WDL WDL        Cardiac WDL    Cardiac WDL WDL        Peripheral/Neurovascular WDL    Peripheral Neurovascular WDL WDL        Cognitive/Neuro/Behavioral WDL    Cognitive/Neuro/Behavioral WDL WDL        [place] Coma Scale    Best Eye Response 4-->(E4) spontaneous     Best Motor Response 6-->(M6) obeys commands     Best Verbal Response 5-->(V5) oriented     [place] Coma Scale Score 15

## 2024-05-09 ENCOUNTER — ANESTHESIA (OUTPATIENT)
Dept: SURGERY | Facility: CLINIC | Age: 64
DRG: 475 | End: 2024-05-09
Payer: MEDICARE

## 2024-05-09 ENCOUNTER — HOSPITAL ENCOUNTER (INPATIENT)
Facility: CLINIC | Age: 64
LOS: 1 days | Discharge: HOME | DRG: 475 | End: 2024-05-10
Attending: ORTHOPAEDIC SURGERY | Admitting: ORTHOPAEDIC SURGERY
Payer: MEDICARE

## 2024-05-09 ENCOUNTER — SURGERY (OUTPATIENT)
Age: 64
End: 2024-05-09
Payer: MEDICARE

## 2024-05-09 DIAGNOSIS — I70.228 ATHEROSCLEROSIS OF NATIVE ARTERIES OF EXTREMITIES WITH REST PAIN, OTHER EXTREMITY (H): ICD-10-CM

## 2024-05-09 DIAGNOSIS — Z98.890 STATUS POST SURGERY: Primary | ICD-10-CM

## 2024-05-09 PROBLEM — Z41.9 SURGERY, ELECTIVE: Status: ACTIVE | Noted: 2024-05-09

## 2024-05-09 LAB
ALBUMIN SERPL BCG-MCNC: 4.1 G/DL (ref 3.5–5.2)
ALP SERPL-CCNC: 100 U/L (ref 40–150)
ALT SERPL W P-5'-P-CCNC: 18 U/L (ref 0–70)
ANION GAP SERPL CALCULATED.3IONS-SCNC: 12 MMOL/L (ref 7–15)
AST SERPL W P-5'-P-CCNC: 24 U/L (ref 0–45)
BILIRUB SERPL-MCNC: 0.4 MG/DL
BUN SERPL-MCNC: 9 MG/DL (ref 8–23)
CALCIUM SERPL-MCNC: 8.4 MG/DL (ref 8.8–10.2)
CHLORIDE SERPL-SCNC: 104 MMOL/L (ref 98–107)
CREAT SERPL-MCNC: 0.68 MG/DL (ref 0.67–1.17)
DEPRECATED HCO3 PLAS-SCNC: 25 MMOL/L (ref 22–29)
EGFRCR SERPLBLD CKD-EPI 2021: >90 ML/MIN/1.73M2
GLUCOSE BLDC GLUCOMTR-MCNC: 104 MG/DL (ref 70–99)
GLUCOSE BLDC GLUCOMTR-MCNC: 88 MG/DL (ref 70–99)
GLUCOSE BLDC GLUCOMTR-MCNC: 94 MG/DL (ref 70–99)
GLUCOSE SERPL-MCNC: 120 MG/DL (ref 70–99)
POTASSIUM SERPL-SCNC: 3.5 MMOL/L (ref 3.4–5.3)
PROT SERPL-MCNC: 6.6 G/DL (ref 6.4–8.3)
SODIUM SERPL-SCNC: 141 MMOL/L (ref 135–145)

## 2024-05-09 PROCEDURE — 99222 1ST HOSP IP/OBS MODERATE 55: CPT | Performed by: PHYSICIAN ASSISTANT

## 2024-05-09 PROCEDURE — 250N000011 HC RX IP 250 OP 636: Performed by: ORTHOPAEDIC SURGERY

## 2024-05-09 PROCEDURE — 250N000012 HC RX MED GY IP 250 OP 636 PS 637: Performed by: PHYSICIAN ASSISTANT

## 2024-05-09 PROCEDURE — 258N000003 HC RX IP 258 OP 636: Performed by: STUDENT IN AN ORGANIZED HEALTH CARE EDUCATION/TRAINING PROGRAM

## 2024-05-09 PROCEDURE — 250N000009 HC RX 250: Performed by: STUDENT IN AN ORGANIZED HEALTH CARE EDUCATION/TRAINING PROGRAM

## 2024-05-09 PROCEDURE — 710N000010 HC RECOVERY PHASE 1, LEVEL 2, PER MIN: Performed by: ORTHOPAEDIC SURGERY

## 2024-05-09 PROCEDURE — 250N000011 HC RX IP 250 OP 636: Performed by: STUDENT IN AN ORGANIZED HEALTH CARE EDUCATION/TRAINING PROGRAM

## 2024-05-09 PROCEDURE — 27880 AMPUTATION OF LOWER LEG: CPT | Performed by: REGISTERED NURSE

## 2024-05-09 PROCEDURE — 82040 ASSAY OF SERUM ALBUMIN: CPT | Performed by: ORTHOPAEDIC SURGERY

## 2024-05-09 PROCEDURE — 250N000013 HC RX MED GY IP 250 OP 250 PS 637: Performed by: STUDENT IN AN ORGANIZED HEALTH CARE EDUCATION/TRAINING PROGRAM

## 2024-05-09 PROCEDURE — 88300 SURGICAL PATH GROSS: CPT | Mod: 26 | Performed by: PATHOLOGY

## 2024-05-09 PROCEDURE — 64447 NJX AA&/STRD FEMORAL NRV IMG: CPT | Mod: 59 | Performed by: STUDENT IN AN ORGANIZED HEALTH CARE EDUCATION/TRAINING PROGRAM

## 2024-05-09 PROCEDURE — 0Y6H0Z3 DETACHMENT AT RIGHT LOWER LEG, LOW, OPEN APPROACH: ICD-10-PCS | Performed by: ORTHOPAEDIC SURGERY

## 2024-05-09 PROCEDURE — 250N000011 HC RX IP 250 OP 636: Performed by: ANESTHESIOLOGY

## 2024-05-09 PROCEDURE — 272N000001 HC OR GENERAL SUPPLY STERILE: Performed by: ORTHOPAEDIC SURGERY

## 2024-05-09 PROCEDURE — 250N000009 HC RX 250: Performed by: ORTHOPAEDIC SURGERY

## 2024-05-09 PROCEDURE — 360N000076 HC SURGERY LEVEL 3, PER MIN: Performed by: ORTHOPAEDIC SURGERY

## 2024-05-09 PROCEDURE — 88300 SURGICAL PATH GROSS: CPT | Mod: TC | Performed by: ORTHOPAEDIC SURGERY

## 2024-05-09 PROCEDURE — 27880 AMPUTATION OF LOWER LEG: CPT | Performed by: STUDENT IN AN ORGANIZED HEALTH CARE EDUCATION/TRAINING PROGRAM

## 2024-05-09 PROCEDURE — 120N000002 HC R&B MED SURG/OB UMMC

## 2024-05-09 PROCEDURE — 250N000013 HC RX MED GY IP 250 OP 250 PS 637: Performed by: ORTHOPAEDIC SURGERY

## 2024-05-09 PROCEDURE — 93005 ELECTROCARDIOGRAM TRACING: CPT

## 2024-05-09 PROCEDURE — 36415 COLL VENOUS BLD VENIPUNCTURE: CPT | Performed by: ORTHOPAEDIC SURGERY

## 2024-05-09 PROCEDURE — 999N000141 HC STATISTIC PRE-PROCEDURE NURSING ASSESSMENT: Performed by: ORTHOPAEDIC SURGERY

## 2024-05-09 PROCEDURE — 370N000017 HC ANESTHESIA TECHNICAL FEE, PER MIN: Performed by: ORTHOPAEDIC SURGERY

## 2024-05-09 PROCEDURE — 250N000013 HC RX MED GY IP 250 OP 250 PS 637: Performed by: PHYSICIAN ASSISTANT

## 2024-05-09 PROCEDURE — 64445 NJX AA&/STRD SCIATIC NRV IMG: CPT | Mod: 59 | Performed by: STUDENT IN AN ORGANIZED HEALTH CARE EDUCATION/TRAINING PROGRAM

## 2024-05-09 PROCEDURE — 250N000025 HC SEVOFLURANE, PER MIN: Performed by: ORTHOPAEDIC SURGERY

## 2024-05-09 RX ORDER — OXYCODONE HYDROCHLORIDE 5 MG/1
5 TABLET ORAL EVERY 4 HOURS PRN
Status: DISCONTINUED | OUTPATIENT
Start: 2024-05-09 | End: 2024-05-10

## 2024-05-09 RX ORDER — ROSUVASTATIN CALCIUM 20 MG/1
40 TABLET, COATED ORAL DAILY
Status: DISCONTINUED | OUTPATIENT
Start: 2024-05-10 | End: 2024-05-10 | Stop reason: HOSPADM

## 2024-05-09 RX ORDER — FENTANYL CITRATE 50 UG/ML
25 INJECTION, SOLUTION INTRAMUSCULAR; INTRAVENOUS EVERY 5 MIN PRN
Status: DISCONTINUED | OUTPATIENT
Start: 2024-05-09 | End: 2024-05-09 | Stop reason: HOSPADM

## 2024-05-09 RX ORDER — PROPRANOLOL HYDROCHLORIDE 40 MG/1
40 TABLET ORAL 3 TIMES DAILY
Status: DISCONTINUED | OUTPATIENT
Start: 2024-05-09 | End: 2024-05-10 | Stop reason: HOSPADM

## 2024-05-09 RX ORDER — ONDANSETRON 2 MG/ML
INJECTION INTRAMUSCULAR; INTRAVENOUS PRN
Status: DISCONTINUED | OUTPATIENT
Start: 2024-05-09 | End: 2024-05-09

## 2024-05-09 RX ORDER — LIDOCAINE 40 MG/G
CREAM TOPICAL
Status: DISCONTINUED | OUTPATIENT
Start: 2024-05-09 | End: 2024-05-10 | Stop reason: HOSPADM

## 2024-05-09 RX ORDER — METHOCARBAMOL 750 MG/1
750 TABLET, FILM COATED ORAL EVERY 6 HOURS PRN
Status: DISCONTINUED | OUTPATIENT
Start: 2024-05-09 | End: 2024-05-10 | Stop reason: HOSPADM

## 2024-05-09 RX ORDER — NALOXONE HYDROCHLORIDE 0.4 MG/ML
0.2 INJECTION, SOLUTION INTRAMUSCULAR; INTRAVENOUS; SUBCUTANEOUS
Status: DISCONTINUED | OUTPATIENT
Start: 2024-05-09 | End: 2024-05-09 | Stop reason: HOSPADM

## 2024-05-09 RX ORDER — FENTANYL CITRATE 50 UG/ML
50 INJECTION, SOLUTION INTRAMUSCULAR; INTRAVENOUS EVERY 5 MIN PRN
Status: DISCONTINUED | OUTPATIENT
Start: 2024-05-09 | End: 2024-05-09 | Stop reason: HOSPADM

## 2024-05-09 RX ORDER — OXYCODONE HYDROCHLORIDE 10 MG/1
10 TABLET ORAL EVERY 4 HOURS PRN
Status: DISCONTINUED | OUTPATIENT
Start: 2024-05-09 | End: 2024-05-10

## 2024-05-09 RX ORDER — NALOXONE HYDROCHLORIDE 0.4 MG/ML
0.4 INJECTION, SOLUTION INTRAMUSCULAR; INTRAVENOUS; SUBCUTANEOUS
Status: DISCONTINUED | OUTPATIENT
Start: 2024-05-09 | End: 2024-05-09 | Stop reason: HOSPADM

## 2024-05-09 RX ORDER — PROCHLORPERAZINE MALEATE 5 MG
10 TABLET ORAL EVERY 6 HOURS PRN
Status: DISCONTINUED | OUTPATIENT
Start: 2024-05-09 | End: 2024-05-10 | Stop reason: HOSPADM

## 2024-05-09 RX ORDER — FENTANYL CITRATE 50 UG/ML
INJECTION, SOLUTION INTRAMUSCULAR; INTRAVENOUS PRN
Status: DISCONTINUED | OUTPATIENT
Start: 2024-05-09 | End: 2024-05-09

## 2024-05-09 RX ORDER — ALBUTEROL SULFATE 90 UG/1
2 AEROSOL, METERED RESPIRATORY (INHALATION)
Status: DISCONTINUED | OUTPATIENT
Start: 2024-05-09 | End: 2024-05-10 | Stop reason: HOSPADM

## 2024-05-09 RX ORDER — ONDANSETRON 2 MG/ML
4 INJECTION INTRAMUSCULAR; INTRAVENOUS EVERY 6 HOURS PRN
Status: DISCONTINUED | OUTPATIENT
Start: 2024-05-09 | End: 2024-05-10 | Stop reason: HOSPADM

## 2024-05-09 RX ORDER — ASPIRIN 81 MG/1
81 TABLET ORAL 2 TIMES DAILY
Status: DISCONTINUED | OUTPATIENT
Start: 2024-05-09 | End: 2024-05-10

## 2024-05-09 RX ORDER — FENTANYL CITRATE 50 UG/ML
25-50 INJECTION, SOLUTION INTRAMUSCULAR; INTRAVENOUS
Status: DISCONTINUED | OUTPATIENT
Start: 2024-05-09 | End: 2024-05-09 | Stop reason: HOSPADM

## 2024-05-09 RX ORDER — KETOROLAC TROMETHAMINE 15 MG/ML
15 INJECTION, SOLUTION INTRAMUSCULAR; INTRAVENOUS EVERY 6 HOURS
Qty: 4 ML | Refills: 0 | Status: COMPLETED | OUTPATIENT
Start: 2024-05-09 | End: 2024-05-10

## 2024-05-09 RX ORDER — NALOXONE HYDROCHLORIDE 0.4 MG/ML
0.4 INJECTION, SOLUTION INTRAMUSCULAR; INTRAVENOUS; SUBCUTANEOUS
Status: DISCONTINUED | OUTPATIENT
Start: 2024-05-09 | End: 2024-05-10 | Stop reason: HOSPADM

## 2024-05-09 RX ORDER — GABAPENTIN 300 MG/1
300 CAPSULE ORAL EVERY EVENING
Status: DISCONTINUED | OUTPATIENT
Start: 2024-05-10 | End: 2024-05-09

## 2024-05-09 RX ORDER — NALOXONE HYDROCHLORIDE 0.4 MG/ML
0.2 INJECTION, SOLUTION INTRAMUSCULAR; INTRAVENOUS; SUBCUTANEOUS
Status: DISCONTINUED | OUTPATIENT
Start: 2024-05-09 | End: 2024-05-10 | Stop reason: HOSPADM

## 2024-05-09 RX ORDER — NICOTINE POLACRILEX 4 MG
15-30 LOZENGE BUCCAL
Status: DISCONTINUED | OUTPATIENT
Start: 2024-05-09 | End: 2024-05-10 | Stop reason: HOSPADM

## 2024-05-09 RX ORDER — TRIAMTERENE AND HYDROCHLOROTHIAZIDE 37.5; 25 MG/1; MG/1
1 CAPSULE ORAL EVERY MORNING
Status: DISCONTINUED | OUTPATIENT
Start: 2024-05-10 | End: 2024-05-10

## 2024-05-09 RX ORDER — POLYETHYLENE GLYCOL 3350 17 G/17G
17 POWDER, FOR SOLUTION ORAL DAILY
Status: DISCONTINUED | OUTPATIENT
Start: 2024-05-10 | End: 2024-05-10 | Stop reason: HOSPADM

## 2024-05-09 RX ORDER — GABAPENTIN 100 MG/1
100 CAPSULE ORAL 3 TIMES DAILY
Status: DISCONTINUED | OUTPATIENT
Start: 2024-05-09 | End: 2024-05-09

## 2024-05-09 RX ORDER — GABAPENTIN 600 MG/1
600 TABLET ORAL AT BEDTIME
Status: DISCONTINUED | OUTPATIENT
Start: 2024-05-09 | End: 2024-05-10

## 2024-05-09 RX ORDER — ACETAMINOPHEN 325 MG/1
650 TABLET ORAL EVERY 8 HOURS
Status: DISCONTINUED | OUTPATIENT
Start: 2024-05-10 | End: 2024-05-10 | Stop reason: HOSPADM

## 2024-05-09 RX ORDER — GABAPENTIN 300 MG/1
300 CAPSULE ORAL AT BEDTIME
Status: DISCONTINUED | OUTPATIENT
Start: 2024-05-10 | End: 2024-05-10 | Stop reason: HOSPADM

## 2024-05-09 RX ORDER — HYDROMORPHONE HCL IN WATER/PF 6 MG/30 ML
0.4 PATIENT CONTROLLED ANALGESIA SYRINGE INTRAVENOUS
Status: DISCONTINUED | OUTPATIENT
Start: 2024-05-09 | End: 2024-05-10 | Stop reason: HOSPADM

## 2024-05-09 RX ORDER — HYDROXYZINE HYDROCHLORIDE 25 MG/1
25 TABLET, FILM COATED ORAL EVERY 6 HOURS PRN
Status: DISCONTINUED | OUTPATIENT
Start: 2024-05-09 | End: 2024-05-10 | Stop reason: HOSPADM

## 2024-05-09 RX ORDER — SERTRALINE HYDROCHLORIDE 100 MG/1
100 TABLET, FILM COATED ORAL DAILY
Status: DISCONTINUED | OUTPATIENT
Start: 2024-05-10 | End: 2024-05-10

## 2024-05-09 RX ORDER — DEXAMETHASONE SODIUM PHOSPHATE 4 MG/ML
4 INJECTION, SOLUTION INTRA-ARTICULAR; INTRALESIONAL; INTRAMUSCULAR; INTRAVENOUS; SOFT TISSUE
Status: DISCONTINUED | OUTPATIENT
Start: 2024-05-09 | End: 2024-05-09 | Stop reason: HOSPADM

## 2024-05-09 RX ORDER — SODIUM CHLORIDE, SODIUM LACTATE, POTASSIUM CHLORIDE, CALCIUM CHLORIDE 600; 310; 30; 20 MG/100ML; MG/100ML; MG/100ML; MG/100ML
INJECTION, SOLUTION INTRAVENOUS CONTINUOUS
Status: DISCONTINUED | OUTPATIENT
Start: 2024-05-09 | End: 2024-05-09 | Stop reason: HOSPADM

## 2024-05-09 RX ORDER — LIDOCAINE HYDROCHLORIDE 20 MG/ML
INJECTION, SOLUTION INFILTRATION; PERINEURAL PRN
Status: DISCONTINUED | OUTPATIENT
Start: 2024-05-09 | End: 2024-05-09

## 2024-05-09 RX ORDER — ACETAMINOPHEN 325 MG/1
975 TABLET ORAL EVERY 8 HOURS
Status: DISCONTINUED | OUTPATIENT
Start: 2024-05-09 | End: 2024-05-09

## 2024-05-09 RX ORDER — BISACODYL 10 MG
10 SUPPOSITORY, RECTAL RECTAL DAILY PRN
Status: DISCONTINUED | OUTPATIENT
Start: 2024-05-12 | End: 2024-05-10 | Stop reason: HOSPADM

## 2024-05-09 RX ORDER — HYDROMORPHONE HYDROCHLORIDE 1 MG/ML
0.4 INJECTION, SOLUTION INTRAMUSCULAR; INTRAVENOUS; SUBCUTANEOUS EVERY 5 MIN PRN
Status: DISCONTINUED | OUTPATIENT
Start: 2024-05-09 | End: 2024-05-09 | Stop reason: HOSPADM

## 2024-05-09 RX ORDER — GABAPENTIN 300 MG/1
300 CAPSULE ORAL AT BEDTIME
COMMUNITY
End: 2024-07-20

## 2024-05-09 RX ORDER — CEFAZOLIN SODIUM/WATER 2 G/20 ML
2 SYRINGE (ML) INTRAVENOUS SEE ADMIN INSTRUCTIONS
Status: DISCONTINUED | OUTPATIENT
Start: 2024-05-09 | End: 2024-05-09 | Stop reason: HOSPADM

## 2024-05-09 RX ORDER — GABAPENTIN 300 MG/1
300 CAPSULE ORAL 2 TIMES DAILY
Status: DISCONTINUED | OUTPATIENT
Start: 2024-05-09 | End: 2024-05-09

## 2024-05-09 RX ORDER — FLUMAZENIL 0.1 MG/ML
0.2 INJECTION, SOLUTION INTRAVENOUS
Status: DISCONTINUED | OUTPATIENT
Start: 2024-05-09 | End: 2024-05-09 | Stop reason: HOSPADM

## 2024-05-09 RX ORDER — HYDROMORPHONE HCL IN WATER/PF 6 MG/30 ML
0.2 PATIENT CONTROLLED ANALGESIA SYRINGE INTRAVENOUS
Status: DISCONTINUED | OUTPATIENT
Start: 2024-05-09 | End: 2024-05-10 | Stop reason: HOSPADM

## 2024-05-09 RX ORDER — CEFAZOLIN SODIUM 2 G/100ML
2 INJECTION, SOLUTION INTRAVENOUS EVERY 8 HOURS
Qty: 200 ML | Refills: 0 | Status: COMPLETED | OUTPATIENT
Start: 2024-05-09 | End: 2024-05-10

## 2024-05-09 RX ORDER — PROPOFOL 10 MG/ML
INJECTION, EMULSION INTRAVENOUS PRN
Status: DISCONTINUED | OUTPATIENT
Start: 2024-05-09 | End: 2024-05-09

## 2024-05-09 RX ORDER — CEFAZOLIN SODIUM/WATER 2 G/20 ML
2 SYRINGE (ML) INTRAVENOUS
Status: COMPLETED | OUTPATIENT
Start: 2024-05-09 | End: 2024-05-09

## 2024-05-09 RX ORDER — HYDROMORPHONE HYDROCHLORIDE 1 MG/ML
0.2 INJECTION, SOLUTION INTRAMUSCULAR; INTRAVENOUS; SUBCUTANEOUS EVERY 5 MIN PRN
Status: DISCONTINUED | OUTPATIENT
Start: 2024-05-09 | End: 2024-05-09 | Stop reason: HOSPADM

## 2024-05-09 RX ORDER — TRANEXAMIC ACID 650 MG/1
1950 TABLET ORAL ONCE
Status: COMPLETED | OUTPATIENT
Start: 2024-05-09 | End: 2024-05-09

## 2024-05-09 RX ORDER — EPHEDRINE SULFATE 50 MG/ML
INJECTION, SOLUTION INTRAMUSCULAR; INTRAVENOUS; SUBCUTANEOUS PRN
Status: DISCONTINUED | OUTPATIENT
Start: 2024-05-09 | End: 2024-05-09

## 2024-05-09 RX ORDER — BUPIVACAINE HYDROCHLORIDE AND EPINEPHRINE 5; 5 MG/ML; UG/ML
INJECTION, SOLUTION PERINEURAL
Status: COMPLETED | OUTPATIENT
Start: 2024-05-09 | End: 2024-05-09

## 2024-05-09 RX ORDER — GABAPENTIN 300 MG/1
600 CAPSULE ORAL EVERY EVENING
COMMUNITY
End: 2024-05-16

## 2024-05-09 RX ORDER — DEXTROSE MONOHYDRATE 25 G/50ML
25-50 INJECTION, SOLUTION INTRAVENOUS
Status: DISCONTINUED | OUTPATIENT
Start: 2024-05-09 | End: 2024-05-10 | Stop reason: HOSPADM

## 2024-05-09 RX ORDER — ONDANSETRON 4 MG/1
4 TABLET, ORALLY DISINTEGRATING ORAL EVERY 6 HOURS PRN
Status: DISCONTINUED | OUTPATIENT
Start: 2024-05-09 | End: 2024-05-10 | Stop reason: HOSPADM

## 2024-05-09 RX ORDER — ACETAMINOPHEN 325 MG/1
650 TABLET ORAL EVERY 4 HOURS PRN
Status: DISCONTINUED | OUTPATIENT
Start: 2024-05-12 | End: 2024-05-10 | Stop reason: HOSPADM

## 2024-05-09 RX ORDER — ONDANSETRON 4 MG/1
4 TABLET, ORALLY DISINTEGRATING ORAL EVERY 30 MIN PRN
Status: DISCONTINUED | OUTPATIENT
Start: 2024-05-09 | End: 2024-05-09 | Stop reason: HOSPADM

## 2024-05-09 RX ORDER — NALOXONE HYDROCHLORIDE 0.4 MG/ML
0.1 INJECTION, SOLUTION INTRAMUSCULAR; INTRAVENOUS; SUBCUTANEOUS
Status: DISCONTINUED | OUTPATIENT
Start: 2024-05-09 | End: 2024-05-09 | Stop reason: HOSPADM

## 2024-05-09 RX ORDER — SODIUM CHLORIDE, SODIUM LACTATE, POTASSIUM CHLORIDE, CALCIUM CHLORIDE 600; 310; 30; 20 MG/100ML; MG/100ML; MG/100ML; MG/100ML
INJECTION, SOLUTION INTRAVENOUS CONTINUOUS PRN
Status: DISCONTINUED | OUTPATIENT
Start: 2024-05-09 | End: 2024-05-09

## 2024-05-09 RX ORDER — AMOXICILLIN 250 MG
1 CAPSULE ORAL 2 TIMES DAILY
Status: DISCONTINUED | OUTPATIENT
Start: 2024-05-09 | End: 2024-05-10 | Stop reason: HOSPADM

## 2024-05-09 RX ORDER — SODIUM CHLORIDE, SODIUM LACTATE, POTASSIUM CHLORIDE, CALCIUM CHLORIDE 600; 310; 30; 20 MG/100ML; MG/100ML; MG/100ML; MG/100ML
INJECTION, SOLUTION INTRAVENOUS CONTINUOUS
Status: DISCONTINUED | OUTPATIENT
Start: 2024-05-09 | End: 2024-05-10 | Stop reason: HOSPADM

## 2024-05-09 RX ORDER — DOXAZOSIN 8 MG/1
8 TABLET ORAL AT BEDTIME
Status: DISCONTINUED | OUTPATIENT
Start: 2024-05-09 | End: 2024-05-10 | Stop reason: HOSPADM

## 2024-05-09 RX ORDER — ONDANSETRON 2 MG/ML
4 INJECTION INTRAMUSCULAR; INTRAVENOUS EVERY 30 MIN PRN
Status: DISCONTINUED | OUTPATIENT
Start: 2024-05-09 | End: 2024-05-09 | Stop reason: HOSPADM

## 2024-05-09 RX ADMIN — Medication 2 G: at 15:19

## 2024-05-09 RX ADMIN — SODIUM CHLORIDE, POTASSIUM CHLORIDE, SODIUM LACTATE AND CALCIUM CHLORIDE: 600; 310; 30; 20 INJECTION, SOLUTION INTRAVENOUS at 20:33

## 2024-05-09 RX ADMIN — OXYCODONE HYDROCHLORIDE 10 MG: 10 TABLET ORAL at 18:25

## 2024-05-09 RX ADMIN — KETOROLAC TROMETHAMINE 15 MG: 15 INJECTION, SOLUTION INTRAMUSCULAR; INTRAVENOUS at 20:56

## 2024-05-09 RX ADMIN — TRANEXAMIC ACID 1950 MG: 650 TABLET ORAL at 13:07

## 2024-05-09 RX ADMIN — PROPRANOLOL HYDROCHLORIDE 40 MG: 40 TABLET ORAL at 22:36

## 2024-05-09 RX ADMIN — Medication 100 MG: at 15:25

## 2024-05-09 RX ADMIN — BUPIVACAINE HYDROCHLORIDE AND EPINEPHRINE BITARTRATE 20 ML: 5; .005 INJECTION, SOLUTION PERINEURAL at 14:50

## 2024-05-09 RX ADMIN — LIDOCAINE HYDROCHLORIDE 100 MG: 20 INJECTION, SOLUTION INFILTRATION; PERINEURAL at 15:23

## 2024-05-09 RX ADMIN — OXYCODONE HYDROCHLORIDE 10 MG: 10 TABLET ORAL at 22:31

## 2024-05-09 RX ADMIN — HYDROMORPHONE HYDROCHLORIDE 0.25 MG: 1 INJECTION, SOLUTION INTRAMUSCULAR; INTRAVENOUS; SUBCUTANEOUS at 16:27

## 2024-05-09 RX ADMIN — ONDANSETRON 4 MG: 2 INJECTION INTRAMUSCULAR; INTRAVENOUS at 16:50

## 2024-05-09 RX ADMIN — FENTANYL CITRATE 50 MCG: 50 INJECTION INTRAMUSCULAR; INTRAVENOUS at 17:59

## 2024-05-09 RX ADMIN — EPHEDRINE SULFATE 5 MG: 5 INJECTION INTRAVENOUS at 16:37

## 2024-05-09 RX ADMIN — GABAPENTIN 300 MG: 300 CAPSULE ORAL at 20:31

## 2024-05-09 RX ADMIN — MIDAZOLAM 2 MG: 1 INJECTION INTRAMUSCULAR; INTRAVENOUS at 15:13

## 2024-05-09 RX ADMIN — MIDAZOLAM 1 MG: 1 INJECTION INTRAMUSCULAR; INTRAVENOUS at 14:33

## 2024-05-09 RX ADMIN — PROPOFOL 30 MG: 10 INJECTION, EMULSION INTRAVENOUS at 16:24

## 2024-05-09 RX ADMIN — DEXMEDETOMIDINE HYDROCHLORIDE 8 MCG: 100 INJECTION, SOLUTION INTRAVENOUS at 17:31

## 2024-05-09 RX ADMIN — EPHEDRINE SULFATE 5 MG: 5 INJECTION INTRAVENOUS at 15:46

## 2024-05-09 RX ADMIN — SODIUM CHLORIDE, POTASSIUM CHLORIDE, SODIUM LACTATE AND CALCIUM CHLORIDE: 600; 310; 30; 20 INJECTION, SOLUTION INTRAVENOUS at 15:11

## 2024-05-09 RX ADMIN — FENTANYL CITRATE 50 MCG: 50 INJECTION INTRAMUSCULAR; INTRAVENOUS at 14:33

## 2024-05-09 RX ADMIN — DEXMEDETOMIDINE HYDROCHLORIDE 4 MCG: 100 INJECTION, SOLUTION INTRAVENOUS at 17:28

## 2024-05-09 RX ADMIN — HYDROMORPHONE HYDROCHLORIDE 0.2 MG: 1 INJECTION, SOLUTION INTRAMUSCULAR; INTRAVENOUS; SUBCUTANEOUS at 18:35

## 2024-05-09 RX ADMIN — ASPIRIN 81 MG: 81 TABLET, COATED ORAL at 20:31

## 2024-05-09 RX ADMIN — INSULIN GLARGINE 26 UNITS: 100 INJECTION, SOLUTION SUBCUTANEOUS at 22:36

## 2024-05-09 RX ADMIN — ROPIVACAINE HYDROCHLORIDE 7 ML/HR: 2 INJECTION, SOLUTION EPIDURAL; INFILTRATION at 17:48

## 2024-05-09 RX ADMIN — SUGAMMADEX 200 MG: 100 INJECTION, SOLUTION INTRAVENOUS at 17:37

## 2024-05-09 RX ADMIN — FENTANYL CITRATE 100 MCG: 50 INJECTION INTRAMUSCULAR; INTRAVENOUS at 15:23

## 2024-05-09 RX ADMIN — DOXAZOSIN MESYLATE 8 MG: 8 TABLET ORAL at 22:33

## 2024-05-09 RX ADMIN — DEXMEDETOMIDINE HYDROCHLORIDE 4 MCG: 100 INJECTION, SOLUTION INTRAVENOUS at 17:24

## 2024-05-09 RX ADMIN — EPHEDRINE SULFATE 5 MG: 5 INJECTION INTRAVENOUS at 16:53

## 2024-05-09 RX ADMIN — HYDROMORPHONE HYDROCHLORIDE 0.4 MG: 1 INJECTION, SOLUTION INTRAMUSCULAR; INTRAVENOUS; SUBCUTANEOUS at 18:22

## 2024-05-09 RX ADMIN — HYDROMORPHONE HYDROCHLORIDE 0.4 MG: 1 INJECTION, SOLUTION INTRAMUSCULAR; INTRAVENOUS; SUBCUTANEOUS at 18:12

## 2024-05-09 RX ADMIN — BUPIVACAINE HYDROCHLORIDE AND EPINEPHRINE 1 ML: 5; 5 INJECTION, SOLUTION EPIDURAL; INTRACAUDAL; PERINEURAL at 17:10

## 2024-05-09 RX ADMIN — DEXMEDETOMIDINE HYDROCHLORIDE 4 MCG: 100 INJECTION, SOLUTION INTRAVENOUS at 17:39

## 2024-05-09 RX ADMIN — HYDROMORPHONE HYDROCHLORIDE 0.25 MG: 1 INJECTION, SOLUTION INTRAMUSCULAR; INTRAVENOUS; SUBCUTANEOUS at 17:34

## 2024-05-09 RX ADMIN — BUPIVACAINE HYDROCHLORIDE AND EPINEPHRINE 10 ML: 5; 5 INJECTION, SOLUTION PERINEURAL at 14:45

## 2024-05-09 RX ADMIN — PROPOFOL 140 MG: 10 INJECTION, EMULSION INTRAVENOUS at 15:25

## 2024-05-09 RX ADMIN — CEFAZOLIN SODIUM 2 G: 2 INJECTION, SOLUTION INTRAVENOUS at 22:31

## 2024-05-09 RX ADMIN — FENTANYL CITRATE 50 MCG: 50 INJECTION INTRAMUSCULAR; INTRAVENOUS at 18:05

## 2024-05-09 ASSESSMENT — ENCOUNTER SYMPTOMS: DYSRHYTHMIAS: 1

## 2024-05-09 ASSESSMENT — ACTIVITIES OF DAILY LIVING (ADL)
ADLS_ACUITY_SCORE: 22
ADLS_ACUITY_SCORE: 22
ADLS_ACUITY_SCORE: 23
ADLS_ACUITY_SCORE: 21
ADLS_ACUITY_SCORE: 22
ADLS_ACUITY_SCORE: 23
ADLS_ACUITY_SCORE: 22
ADLS_ACUITY_SCORE: 23
ADLS_ACUITY_SCORE: 22
ADLS_ACUITY_SCORE: 23

## 2024-05-09 NOTE — ANESTHESIA PROCEDURE NOTES
Airway       Patient location during procedure: OR       Procedure Start/Stop Times: 5/9/2024 3:26 PM  Staff -        CRNA: Esther Varma APRN CRNA       Performed By: CRNA  Consent for Airway        Urgency: elective  Indications and Patient Condition       Indications for airway management: lety-procedural       Induction type:RSI       Mask difficulty assessment: 0 - not attempted    Final Airway Details       Final airway type: endotracheal airway       Successful airway: ETT - single and Oral  Endotracheal Airway Details        ETT size (mm): 7.5       Cuffed: yes       Cuff volume (mL): 9       Successful intubation technique: video laryngoscopy       VL Blade Size: MAC 4       Grade View of Cords: 1       Adjucts: stylet       Position: Center       Measured from: lips       Secured at (cm): 23       Bite block used: None    Post intubation assessment        Placement verified by: capnometry, equal breath sounds and chest rise        Number of attempts at approach: 1       Number of other approaches attempted: 0       Secured with: silk tape       Ease of procedure: easy       Dentition: Intact and Unchanged    Medication(s) Administered   Medication Administration Time: 5/9/2024 3:26 PM

## 2024-05-09 NOTE — ANESTHESIA PROCEDURE NOTES
Sciatic Procedure Note    Pre-Procedure   Staff -        Anesthesiologist:  Alfred Coats MD       Performed By: anesthesiologist       Location: pre-op       Procedure Start/Stop Times: 5/9/2024 2:50 PM and 5/9/2024 3:00 PM       Pre-Anesthestic Checklist: patient identified, IV checked, site marked, risks and benefits discussed, informed consent, monitors and equipment checked, pre-op evaluation, at physician/surgeon's request and post-op pain management  Timeout:       Correct Patient: Yes        Correct Procedure: Yes        Correct Site: Yes        Correct Position: Yes        Correct Laterality: Yes        Site Marked: Yes  Procedure Documentation  Procedure: Sciatic       Laterality: right       Patient Position: RLD       Local skin infiltrated with mL of 2% lidocaine.  (gluteal approach).       Needle Type: [employer] needle       Needle Gauge: 17.        Needle Length (millimeters): 100        Catheter: 18 G.          Catheter threaded easily.             Ultrasound guided       1. Ultrasound was used to identify targeted nerve, plexus, vascular marker, or fascial plane and place a needle adjacent to it in real-time.       2. Ultrasound was used to visualize the spread of anesthetic in close proximity to the above referenced structure.       3. A permanent image is entered into the patient's record.       4. The visualized anatomic structures appeared normal.       5. There were no apparent abnormal pathologic findings.    Assessment/Narrative         The placement was negative for: blood aspirated, painful injection and site bleeding       Paresthesias: No.       Bolus given via catheter. no blood aspirated via catheter.        Secured via tunneling, Tegaderm and Dermabond.        Insertion/Infusion Method: Continuous Infusion       Complications: none    Medication(s) Administered   Bupivacaine 0.5% w/ 1:200K Epi (Other) - Other   20 mL - 5/9/2024 2:50:00 PM  Medication Administration Time: 5/9/2024 2:50  "PM     Comments:  Risk benefit alternatives explained. Patient agrees to undergo a block.   Procedure done with no issues, no complications, good visualization under US, local anesthetic spread satisfactory. Catheter advanced without resistance, no paresthesias, no heme.    Patient tolerated the procedure well.        FOR [facility] ([facility]) ONLY:   Pain Team Contact information: please page the Pain Team Via [employer]. Search \"Pain\". During daytime hours, please page the attending first. At night please page the resident first.      "

## 2024-05-09 NOTE — ANESTHESIA CARE TRANSFER NOTE
Patient: Erwin Aguilar    Procedure: Procedure(s):  Right below knee amputation (transtibial amputation)       Diagnosis: Achilles tendon infection [M65.10]  Diagnosis Additional Information: No value filed.    Anesthesia Type:   General     Note:    Oropharynx: oropharynx clear of all foreign objects and spontaneously breathing  Level of Consciousness: awake  Oxygen Supplementation: nasal cannula  Level of Supplemental Oxygen (L/min / FiO2): 3  Independent Airway: airway patency satisfactory and stable  Dentition: dentition unchanged  Vital Signs Stable: post-procedure vital signs reviewed and stable  Report to RN Given: handoff report given  Patient transferred to: PACU    Handoff Report: Identifed the Patient, Identified the Reponsible Provider, Reviewed the pertinent medical history, Discussed the surgical course, Reviewed Intra-OP anesthesia mangement and issues during anesthesia, Set expectations for post-procedure period and Allowed opportunity for questions and acknowledgement of understanding      Vitals:  Vitals Value Taken Time   /71 05/09/24 1748   Temp 37.1 C 05/09/24 1748   Pulse 59 05/09/24 1751   Resp 28 05/09/24 1751   SpO2 98 % 05/09/24 1751   Vitals shown include unfiled device data.    Electronically Signed By: VANCE Winter CRNA  May 9, 2024  5:53 PM  
Yes

## 2024-05-09 NOTE — PROGRESS NOTES
PACU to Inpatient Nursing Handoff    Patient Erwin Aguilar is a 63 year old male who speaks English.   Procedure Procedure(s):  Right below knee amputation (transtibial amputation)   Surgeon(s) Primary: Kurtis Nye MD  Resident - Assisting: Kurtis Hernadez MD     Allergies   Allergen Reactions    Levaquin Anaphylaxis and Rash     Swelling in lip and tongue felt thick    Lisinopril Anaphylaxis     Swollen tongue; inability to swallow; drooling; hives; swollen face, neck, angioedema    Acetaminophen      Hx of cirrhosis     Amlodipine      Swelling, hives, possible angioedema      Morphine      b/p dropped and arms went numb  Hypotension    Quinolones Hives    Spironolactone Unknown     Pt believes himself to be allergic to it; cannot find his old records of this.-mjc     Bactrim [Sulfamethoxazole-Trimethoprim] Rash       Isolation  No active isolations     Past Medical History   has a past medical history of Actinic keratosis, Anxiety (), Cancer (H), Cauda equina spinal cord injury (H), Chronic sinusitis (5-1-16), Depressive disorder, Diastasis recti, Esophageal reflux, Esophageal varices in cirrhosis (H) (4/1/2014), Essential hypertension, benign, Intermittent asthma, Mild depression, Mixed hyperlipidemia, Nasal polyps (5-1-16), Osteoarthritis of lumbar spine, unspecified spinal osteoarthritis complication status, Other chronic pain, Paroxysmal atrial fibrillation (H) (9/22/2021), SCCA (squamous cell carcinoma) of skin, Seasonal allergic conjunctivitis, Type II or unspecified type diabetes mellitus without mention of complication, not stated as uncontrolled, and Unspecified site of spinal cord injury without evidence of spinal bone injury.    Anesthesia General with Block   Dermatome Level     Preop Meds TXA - time given: 1307   Nerve block Sciatic and femoral block .  Location:right. Med:bupivacaine and ropivacaine. Time given: 1450 when block was initiated. Has continuous block   Intraop Meds  midazolam 1 mg/mL (mg)   Total dose: 2 mg  Date/Time Rate/Dose/Volume Action Route Admin User Audit    05/09/24 1513 2 mg Given Intravenous ErdleeannelEsther APRN CRNA     HYDROmorphone 1 mg/mL (mg)   Total dose: 0.5 mg  Date/Time Rate/Dose/Volume Action Route Admin User Audit    05/09/24 1627 0.25 mg Given Intravenous Erdahl, Esther, APRN CRNA     1734 0.25 mg Given Intravenous Erdahl, RAYA SantanaN CRNA     lidocaine 2% (mg)   Total dose: 100 mg  Date/Time Rate/Dose/Volume Action Route Admin User Audit    05/09/24 1523 100 mg Given Intravenous Juli Greenfield MD edited    propofol 10 mg/mL (mg)   Total dose: 170 mg  Date/Time Rate/Dose/Volume Action Route Admin User Audit    05/09/24 1525 140 mg Given Intravenous Juli Greenfield MD edited    1624 30 mg Given Intravenous ShawneelEsther APRN CRNA edited    rocuronium 10 mg/mL (mg)   Total dose: 100 mg  Date/Time Rate/Dose/Volume Action Route Admin User Audit    05/09/24 1525 100 mg Given Intravenous Juli Greenfield MD edited    ePHEDrine 5 mg/mL in NS (mg)   Total dose: 15 mg  Date/Time Rate/Dose/Volume Action Route Admin User Audit    05/09/24 1546 5 mg Given Intravenous ErdleeannelEsther APRN CRNA     1637 5 mg Given Intravenous ErdahlEsther APRN CRNA     1653 5 mg Given Intravenous ErdahlEsther APRN CRNA     ondansetron 2 mg/mL (mg)   Total dose: 4 mg  Date/Time Rate/Dose/Volume Action Route Admin User Audit    05/09/24 1650 4 mg - 2 mL Given Intravenous Erdleeannel, Esther APRN CRNA     sugammadex (BRIDION) 200mg/2mL (mg)   Total dose: 200 mg  Date/Time Rate/Dose/Volume Action Route Admin User Audit    05/09/24 1737 200 mg Given Intravenous ErdEsther wilhelm APRN CRNA     Bupivacaine 0.5% w/ 1:200K Epi (Other) (mL)   Total volume: 20 mL  Date/Time Rate/Dose/Volume Action Route Admin User Audit    05/09/24 6223 20 mL Given Other Alfred Coats MD     Bupivacaine 0.5% w/ 1:200K Epi (Other) (mL)   Total volume: 10 mL  Date/Time Rate/Dose/Volume  Action Route Admin User Audit    05/09/24 1445 10 mL Given Other Alfred Coats MD     ceFAZolin Sodium (ANCEF) injection 2 g (g)   Total dose: 2 g Dosing weight: 89.8  Date/Time Rate/Dose/Volume Action Route Admin User Audit    05/09/24 1519 2 g (over 3 min) Given Intravenous Erdahl, Esther, APRN CRNA edited    fentaNYL 50 mcg/mL (mcg)   Total dose: 100 mcg  Date/Time Rate/Dose/Volume Action Route Admin User Audit    05/09/24 1523 100 mcg Given Intramuscular Juli Greenfield MD     dexmedeTOMIDine (PRECEDEX) 4 mcg/mL bolus (mcg)   Total dose: 20 mcg  Date/Time Rate/Dose/Volume Action Route Admin User Audit    05/09/24 1724 4 mcg New Bag Intravenous Erdahl, Esther, APRN CRNA edited    1728 4 mcg Bolus Intravenous Erdahl, Esther, APRN CRNA edited    1731 8 mcg Bolus Intravenous Erdahl, Esther, APRN CRNA     1739 4 mcg Bolus Intravenous Erdahl, Esther, APRN CRNA     LR (mL)   Total volume: 800 mL  Date/Time Rate/Dose/Volume Action Route Admin User Audit    05/09/24 1511  New Bag Intravenous Erdahl, Esther, APRN CRNA     1516 300 mL Anesthesia Volume Adjustment Intravenous Erdahl, Esther, APRN CRNA     1550 400 mL Anesthesia Volume Adjustment Intravenous Erdahl, Esther, APRN CRNA     1722 100 mL Anesthesia Volume Adjustment Intravenous Erdahl, Esther, APRN CRNA        Local Meds Yes   Antibiotics cefazolin (Ancef) - last given at 1519     Pain Patient Currently in Pain: denies   PACU meds  fentanyl (Sublimaze): 100 mcg (total dose) last given at 1805   hydromorphone (Dilaudid): 1 mg (total dose) last given at 1835   oxycodone (Roxicodone): 10 mg (total dose) last given at 1825    PCA / epidural No. But continuous nerve block   Capnography     Telemetry ECG Rhythm: Sinus bradycardia   Inpatient Telemetry Monitor Ordered? No        Labs Glucose Lab Results   Component Value Date    GLC 94 05/09/2024     07/15/2022     04/09/2021       Hgb Lab Results   Component Value Date    HGB 13.7 04/23/2024     HGB 14.1 04/09/2021       INR Lab Results   Component Value Date    INR 1.23 01/05/2024    INR 1.01 04/09/2021      PACU Imaging Not applicable     Wound/Incision Incision/Surgical Site 11/08/12 Mid;Anterior Abdomen (Active)   Number of days: 4200       Incision/Surgical Site 06/28/18 Bilateral Back (Active)   Number of days: 2142       Incision/Surgical Site 06/28/18 Right Buttocks (Active)   Number of days: 2142       Incision/Surgical Site Lower;Right Back (Active)   Number of days:        Incision/Surgical Site 11/11/20 Right Heel (Active)   Number of days: 1275       Incision/Surgical Site 08/31/23 Left Elbow (Active)   Number of days: 252       Incision/Surgical Site 08/31/23 Left Wrist (Active)   Number of days: 252       Incision/Surgical Site 04/23/24 Anterior;Left Thigh (Active)   Incision Assessment WDL 04/24/24 0000   Dressing Pressure dressing 04/23/24 1643   Elayne-Incision Assessment Ecchymosis 04/24/24 0000   Closure Liquid bandage 04/24/24 0000   Incision Drainage Amount None 04/24/24 0000   Dressing Intervention Open to air / No Dressing 04/24/24 0000   Number of days: 16       Incision/Surgical Site 05/09/24 Right Tibial (Active)   Incision Assessment [facility] 05/09/24 1718   Dressing Per Plan of Care 05/09/24 1718   Closure Sutures 05/09/24 1718   Dressing Intervention New dressing applied 05/09/24 1718   Number of days: 0      CMS        Equipment Not applicable   Other LDA ETT Cuffed 7.5 mm (Active)   Number of days: 0        IV Access Peripheral IV Anterior;Right Upper forearm (Active)   Site Assessment WDL 05/09/24 1329   Line Status Saline locked 05/09/24 1329   Dressing Transparent 05/09/24 1329   Dressing Status clean;dry;intact 05/09/24 1329   Dressing Intervention New dressing  05/09/24 1329   Phlebitis Scale 0-->no symptoms 05/09/24 1329   Infiltration? no 05/09/24 1329   Number of days:        Peripheral Nerve Block Catheter 05/09/24 Sciatic right (Active)   Number of days: 0       Peripheral  Nerve Block Catheter 05/09/24 Femoral right (Active)   Number of days: 0      Blood Products Not applicable EBL  mL   Intake/Output Date 05/09/24 0700 - 05/10/24 0659   Shift 8318-0506 2514-1084 5577-0313 24 Hour Total   INTAKE   I.V.  800  800   IV Piggyback  2  2   Shift Total(mL/kg)  802(8.72)  802(8.72)   OUTPUT   Blood  50  50   Shift Total(mL/kg)  50(0.54)  50(0.54)   Weight (kg) 92 92 92 92      Drains / Rodriguez     Time of void PreOp Time of Void Prior to Procedure: 1300 (05/09/24 1308)    PostOp      Diapered? No   Bladder Scan  450 and voided a small amount   PO    tolerating sips     Vitals    B/P: (!) 146/76  T: 97.2  F (36.2  C)    Temp src: Axillary  P:  Pulse: 55 (05/09/24 1830)          R: 12  O2:  SpO2: 95 %    O2 Device: None (Room air) (05/09/24 1830)              Family/support present significant other   Patient belongings     Patient transported on bed   DC meds/scripts (obs/outpt) Not applicable   Inpatient Pain Meds Released? Yes       Special needs/considerations None   Tasks needing completion None       RODERICK Beckwith

## 2024-05-09 NOTE — PROGRESS NOTES
I saw Mr. Aguilar preoperatively today in the preop area at [facility]. The treatment plan, what the surgery involves, the risks benefits and alternatives to surgery, and the postop plan were all discussed in layman's terms. The opportunity to delay or cancel surgery to see if the newly restored blood flow improved his chronic pain was offered and politely declined. The expected approximate level of the right below knee amputation was demonstrated.  The correct surgical site was marked with patient participation. All questions were answered.

## 2024-05-09 NOTE — ANESTHESIA PREPROCEDURE EVALUATION
Anesthesia Pre-Procedure Evaluation    Patient: Erwin Aguilar   MRN: [id number] : 1960        Procedure : Procedure(s):  Right below knee amputation (transtibial amputation)          Past Medical History:   Diagnosis Date    Actinic keratosis     aldara    Anxiety     Cancer (H)     squamous cell skin CA    Cauda equina spinal cord injury (H)     Chronic sinusitis 16    Depressive disorder     Diastasis recti     Esophageal reflux     Esophageal varices in cirrhosis (H) 2014    Hospitalized for UGI blee 3/28/14, endoscopy revealed bleeding varices.    Essential hypertension, benign     Intermittent asthma     Mild depression     Mixed hyperlipidemia     Nasal polyps 16    Osteoarthritis of lumbar spine, unspecified spinal osteoarthritis complication status     Other chronic pain     Paroxysmal atrial fibrillation (H) 2021    SCCA (squamous cell carcinoma) of skin     Seasonal allergic conjunctivitis     Type II or unspecified type diabetes mellitus without mention of complication, not stated as uncontrolled     Unspecified site of spinal cord injury without evidence of spinal bone injury     due to back surgery      Past Surgical History:   Procedure Laterality Date    ABDOMEN SURGERY  2014    ANGIOGRAM Right 2024    Procedure: Ultrasound guided percutaneous transarterial left common femoral artery access, diagnostic aortoiliac angiogram, selective cannulation of right comon iliac, righ external iliac, right common femoral, and right superficial femoral artery, balloon angioplasty of right superficial femoral artery, 6mm x 12 cm self-expanding drug-coated stent placement of right superficial femoral artery, lef    BACK SURGERY  2009    COLONOSCOPY N/A 2016    Procedure: COMBINED COLONOSCOPY, SINGLE OR MULTIPLE BIOPSY/POLYPECTOMY BY BIOPSY;  Surgeon: Ana Paula Urbina MD;  [facility]: [facility]    DECOMPRESSION CUBITAL TUNNEL Left 2023    Procedure: LEFT  CUBITAL TUNNEL RELEASE,;  Surgeon: Deny Dixon MD;  Location: [facility] OR    ENDOSCOPY UPPER, COLONOSCOPY, COMBINED  10/19/2011    Procedure:COMBINED ENDOSCOPY UPPER, COLONOSCOPY; Upper Endoscopy, Colonoscopy with Polypectomy x4; Surgeon:AMBAR RODRÍGUEZ; Location:[facility] OR    ENT SURGERY  1-2016    Ongoing since then    ESOPHAGOSCOPY, GASTROSCOPY, DUODENOSCOPY (EGD), COMBINED  3/28/2014    Procedure: COMBINED ESOPHAGOSCOPY, GASTROSCOPY, DUODENOSCOPY (EGD);  EGD, Gastric Biopsies, Esophageal Banding;  Surgeon: Reyna Tovar MD;  [facility]    ESOPHAGOSCOPY, GASTROSCOPY, DUODENOSCOPY (EGD), COMBINED  6/9/2014    Procedure: COMBINED ESOPHAGOSCOPY, GASTROSCOPY, DUODENOSCOPY (EGD);  Surgeon: Curtis Mendez MD;  Location:  GI    ESOPHAGOSCOPY, GASTROSCOPY, DUODENOSCOPY (EGD), COMBINED  7/24/2014    Procedure: COMBINED ESOPHAGOSCOPY, GASTROSCOPY, DUODENOSCOPY (EGD);  Surgeon: Gerard Samaniego MD;  [facility]    ESOPHAGOSCOPY, GASTROSCOPY, DUODENOSCOPY (EGD), COMBINED N/A 10/31/2014    Procedure: COMBINED ESOPHAGOSCOPY, GASTROSCOPY, DUODENOSCOPY (EGD);  Surgeon: Gerard Samaniego MD;  [facility] OR    ESOPHAGOSCOPY, GASTROSCOPY, DUODENOSCOPY (EGD), COMBINED N/A 5/12/2016    Procedure: COMBINED ESOPHAGOSCOPY, GASTROSCOPY, DUODENOSCOPY (EGD);  Surgeon: Ana Paula Urbina MD;  [facility]    ESOPHAGOSCOPY, GASTROSCOPY, DUODENOSCOPY (EGD), COMBINED N/A 8/2/2018    Procedure: COMBINED ESOPHAGOSCOPY, GASTROSCOPY, DUODENOSCOPY (EGD);  EGD;  Surgeon: Yu Wagner MD;  Location: [facility]    HCL SQUAMOUS CELL CARCINOMA AG  10/07    left forearm    HERNIORRHAPHY UMBILICAL  11/8/2012    Procedure: HERNIORRHAPHY UMBILICAL;  Open Umbilical Hernia Repair With Mesh ;  Surgeon: Chase Nicholson MD;  [facility]: [facility]    INSERT STIMULATOR DORSAL COLUMN N/A 6/28/2018    Procedure: INSERT STIMULATOR DORSAL COLUMN;;  Surgeon: Elvis Sauceda MD;  Location:  OR    IR  LOWER EXTREMITY ANGIOGRAM RIGHT  2024    neuro stimulator  2010    RELEASE CARPAL TUNNEL Left 2023    Procedure: LEFT OPEN CARPAL TUNNEL RELEASE,;  Surgeon: Deny Dixon MD;  Location: [facility] OR    RELEASE TRIGGER FINGER Left 2023    Procedure: Release left trigger finger thumb;  Surgeon: Deny Dixon MD;  Location: [facility] OR    REMOVE GENERATOR STIMULATOR (LOCATION) N/A 2018    Procedure: REMOVE GENERATOR STIMULATOR (LOCATION);  Spinal Cord Stimulator and IPG Explant and Re-Implant of SCS System (Leads and IPG);  Surgeon: Elvis Sauceda MD;  [facility]    REPAIR TENDON ACHILLES Right 2020    Procedure: Right achilles debridement and partial calcaneus excision;  Surgeon: Eh Sandoval MD;  Location: [facility] OR    SURGICAL HISTORY OF -       abcess tooth    SURGICAL HISTORY OF -       L4-5 laminectomy, cauda equina syndrome    SURGICAL HISTORY OF -   +    herniated disk repair    TONSILLECTOMY  10 1964    TRANSPOSITION ULNAR NERVE (ELBOW)      right    ZZC APPENDECTOMY        Allergies   Allergen Reactions    Levaquin Anaphylaxis and Rash     Swelling in lip and tongue felt thick    Lisinopril Anaphylaxis     Swollen tongue; inability to swallow; drooling; hives; swollen face, neck, angioedema    Acetaminophen      Hx of cirrhosis     Amlodipine      Swelling, hives, possible angioedema      Morphine      b/p dropped and arms went numb  Hypotension    Quinolones Hives    Spironolactone Unknown     Pt believes himself to be allergic to it; cannot find his old records of this.-mjc     Bactrim [Sulfamethoxazole-Trimethoprim] Rash      Social History     Tobacco Use    Smoking status: Former     Current packs/day: 0.00     Types: Cigarettes     Start date: 10/13/1983     Quit date: 1984     Years since quittin.9     Passive exposure: Past    Smokeless tobacco: Never   Substance Use Topics    Alcohol use: Not Currently     Comment: - last drink was  3/28/14      Wt Readings from Last 1 Encounters:   05/09/24 92 kg (202 lb 13.2 oz)        Anesthesia Evaluation            ROS/MED HX  ENT/Pulmonary:     (+)                      asthma                  Neurologic:     (+)                     Spinal cord injury,           Cardiovascular:     (+) Dyslipidemia hypertension- -   -  - -                        dysrhythmias, a-fib, Irregular Heartbeat/Palpitations,            METS/Exercise Tolerance:     Hematologic:       Musculoskeletal:       GI/Hepatic:     (+) GERD,            liver disease,       Renal/Genitourinary:       Endo:     (+)  type II DM,                    Psychiatric/Substance Use:     (+) psychiatric history anxiety       Infectious Disease:       Malignancy:       Other:      (+)  , H/O Chronic Pain,         Physical Exam    Airway        Mallampati: I   TM distance: > 3 FB      Respiratory Devices and Support         Dental       (+) Edentulous      Cardiovascular   cardiovascular exam normal       Rhythm and rate: irregular   (+) carotid bruit is present and murmur       Pulmonary   pulmonary exam normal                OUTSIDE LABS:  CBC:   Lab Results   Component Value Date    WBC 7.4 04/23/2024    WBC 7.1 01/05/2024    HGB 13.7 04/23/2024    HGB 15.7 01/05/2024    HCT 39.9 (L) 04/23/2024    HCT 45.1 01/05/2024     04/23/2024     01/05/2024     BMP:   Lab Results   Component Value Date     04/23/2024     01/05/2024    POTASSIUM 3.7 04/23/2024    POTASSIUM 3.6 01/05/2024    CHLORIDE 108 (H) 04/23/2024    CHLORIDE 101 01/05/2024    CO2 24 04/23/2024    CO2 27 01/05/2024    BUN 11.5 04/23/2024    BUN 8.9 01/05/2024    CR 0.67 04/23/2024    CR 0.84 01/05/2024    GLC 88 05/09/2024     (H) 04/24/2024     COAGS:   Lab Results   Component Value Date    PTT 63 (H) 07/16/2022    INR 1.23 (H) 01/05/2024     POC:   Lab Results   Component Value Date     (H) 11/11/2020     HEPATIC:   Lab Results   Component Value Date     "ALBUMIN 4.5 01/05/2024    PROTTOTAL 8.3 01/05/2024    ALT 15 01/05/2024    AST 19 01/05/2024    ALKPHOS 122 01/05/2024    BILITOTAL 0.6 01/05/2024     OTHER:   Lab Results   Component Value Date    LACT 1.0 07/16/2022    A1C 5.9 (H) 05/03/2024    [place] 8.9 04/23/2024    PHOS 2.9 05/21/2013    MAG 1.9 07/11/2015    LIPASE 134 11/23/2020    TSH 1.36 10/09/2018    CRP 6.4 08/10/2015    SED 15 07/16/2022       Anesthesia Plan    ASA Status:  3       Anesthesia Type: General.     - Airway: ETT   Induction: Intravenous.   Maintenance: Balanced.   Techniques and Equipment:     - Lines/Monitors: 2nd IV     Consents    Anesthesia Plan(s) and associated risks, benefits, and realistic alternatives discussed. Questions answered and patient/representative(s) expressed understanding.     - Discussed:     - Discussed with:  Patient      - Extended Intubation/Ventilatory Support Discussed: No.      - Patient is DNR/DNI Status: No          Postoperative Care    Pain management: Peripheral nerve block (Continuous), IV analgesics, Oral pain medications.   PONV prophylaxis: Ondansetron (or other 5HT-3), Dexamethasone or Solumedrol     Comments:    Other Comments: 63 year old for amputation BTK... History of asthma, HTN, Dyslipidemia, DM left carotid stenosis, paroxysmal a-fib, hx. Of portal hypertension with varices, anxiety, skin cancer (squamous cell, CA), Cauda Equina syndrome.           Orville Chaudhary DO    I have reviewed the pertinent notes and labs in the chart from the past 30 days and (re)examined the patient.  Any updates or changes from those notes are reflected in this note.            # Drug Induced Coagulation Defect: home medication list includes an anticoagulant medication  # Drug Induced Platelet Defect: home medication list includes an antiplatelet medication  # Overweight: Estimated body mass index is 29.1 kg/m  as calculated from the following:    Height as of this encounter: 1.778 m (5' 10\").    Weight as of this " encounter: 92 kg (202 lb 13.2 oz).

## 2024-05-09 NOTE — ANESTHESIA POSTPROCEDURE EVALUATION
Patient: Erwin Aguilar    Procedure: Procedure(s):  Right below knee amputation (transtibial amputation)       Anesthesia Type:  General    Note:  Disposition: Inpatient   Postop Pain Control: Uneventful            Sign Out: Well controlled pain   PONV: No   Neuro/Psych: Uneventful            Sign Out: Acceptable/Baseline neuro status   Airway/Respiratory: Uneventful            Sign Out: Acceptable/Baseline resp. status   CV/Hemodynamics: Uneventful            Sign Out: Acceptable CV status; No obvious hypovolemia; No obvious fluid overload   Other NRE: NONE   DID A NON-ROUTINE EVENT OCCUR? No           Last vitals:  Vitals Value Taken Time   /76 05/09/24 1830   Temp 36.2  C (97.2  F) 05/09/24 1835   Pulse 55 05/09/24 1841   Resp 18 05/09/24 1835   SpO2 97 % 05/09/24 1841   Vitals shown include unfiled device data.    Electronically Signed By: Juli Greenfield MD  May 9, 2024  6:42 PM

## 2024-05-09 NOTE — BRIEF OP NOTE
[facility]    Brief Operative Note    Pre-operative diagnosis: Achilles tendon infection [M65.10]  Post-operative diagnosis Same as pre-operative diagnosis    Procedure: Right below knee amputation (transtibial amputation), Right - Leg    Surgeon: Surgeons and Role:     * Kurtis Nye MD - Primary     * Kurtis Hernadez MD - Resident - Assisting  Anesthesia: Epidural with Block   Estimated Blood Loss: 50 cc    Drains: None  Specimens:   ID Type Source Tests Collected by Time Destination   1 :  Amputation, Non-Traumatic Leg, Below Knee, Right SURGICAL PATHOLOGY EXAM Kurtis Nye MD 5/9/2024  4:22 PM      Findings:   None.  Complications: None.  Implants: * No implants in log *    Assessment and Plan: Erwin Aguilar is a 63 year old male now s/p Right BKA on 5/9/2024 with Dr. Nye.     Ortho Primary  Activity: Up with assist until independent  Weight bearing status: NWB RLE  Pain management:   Transition from IV to PO narcotics as tolerated.   Antibiotics: Antibtioics x 24 hours.  Diet: Begin with clear fluids and progress diet as tolerated.   DVT prophylaxis: ASA 162mg daily x6 weeks. SCDs  Imaging: None  Labs: Hgb POD 1  Bracing/Splinting: Stump protector  Dressings: Keep dressing c/d/I until follow-up.   Physical Therapy/Occupational Therapy: Eval and treat  Consults: Hospitalist  Follow-up: Clinic with Dr. Nye in 2 weeks for wound check. Sutures to remain in place for at least 3-4 weeks.  Disposition: Pending progress with therapies, pain control on orals, and medical stability, anticipate discharge to home on POD #2-4.    Kurtis Hernadez MD  Orthopaedic Surgery PGY-4

## 2024-05-09 NOTE — ANESTHESIA PROCEDURE NOTES
Femoral Procedure Note    Pre-Procedure   Staff -        Anesthesiologist:  Alfred Coats MD       Performed By: anesthesiologist       Location: pre-op       Procedure Start/Stop Times: 5/9/2024 2:45 PM and 5/9/2024 3:00 PM       Pre-Anesthestic Checklist: patient identified, IV checked, site marked, risks and benefits discussed, informed consent, monitors and equipment checked, pre-op evaluation, at physician/surgeon's request and post-op pain management  Timeout:       Correct Patient: Yes        Correct Procedure: Yes        Correct Site: Yes        Correct Position: Yes        Correct Laterality: Yes        Site Marked: Yes  Procedure Documentation  Procedure: Femoral       Laterality: right       Patient Position: sitting       Local skin infiltrated with mL of 2% lidocaine.        Needle Type: ToRipple Labsy needle       Needle Gauge: 17.        Needle Length (millimeters): 100        Catheter: 18 G.          Catheter threaded easily.             Ultrasound guided       1. Ultrasound was used to identify targeted nerve, plexus, vascular marker, or fascial plane and place a needle adjacent to it in real-time.       2. Ultrasound was used to visualize the spread of anesthetic in close proximity to the above referenced structure.       3. A permanent image is entered into the patient's record.       4. The visualized anatomic structures appeared normal.       5. There were no apparent abnormal pathologic findings.    Assessment/Narrative         The placement was negative for: blood aspirated, painful injection and site bleeding       Paresthesias: No.       Bolus given via catheter. no blood aspirated via catheter.        Secured via tunneling, Tegaderm and Dermabond.        Insertion/Infusion Method: Continuous Infusion       Complications: none    Medication(s) Administered   Bupivacaine 0.5% w/ 1:200K Epi (Other) - Other   10 mL - 5/9/2024 2:45:00 PM  Medication Administration Time: 5/9/2024 2:45 PM     Comments:  " Risk benefit alternatives explained. Patient agrees to undergo a block.   Procedure done with no issues, no complications, good visualization under US, local anesthetic spread satisfactory. Catheter advanced without resistance, no paresthesias, no heme.    Patient tolerated the procedure well.        FOR [facility] ([facility] [place]) ONLY:   Pain Team Contact information: please page the Pain Team Via [employer]. Search \"Pain\". During daytime hours, please page the attending first. At night please page the resident first.      "

## 2024-05-10 VITALS
TEMPERATURE: 97.6 F | BODY MASS INDEX: 29.04 KG/M2 | SYSTOLIC BLOOD PRESSURE: 162 MMHG | DIASTOLIC BLOOD PRESSURE: 67 MMHG | OXYGEN SATURATION: 95 % | WEIGHT: 202.82 LBS | RESPIRATION RATE: 16 BRPM | HEIGHT: 70 IN | HEART RATE: 70 BPM

## 2024-05-10 LAB
ALBUMIN SERPL BCG-MCNC: 4.2 G/DL (ref 3.5–5.2)
ALP SERPL-CCNC: 106 U/L (ref 40–150)
ALT SERPL W P-5'-P-CCNC: 18 U/L (ref 0–70)
ANION GAP SERPL CALCULATED.3IONS-SCNC: 9 MMOL/L (ref 7–15)
AST SERPL W P-5'-P-CCNC: 32 U/L (ref 0–45)
BILIRUB SERPL-MCNC: 0.7 MG/DL
BUN SERPL-MCNC: 8.1 MG/DL (ref 8–23)
CALCIUM SERPL-MCNC: 8.8 MG/DL (ref 8.8–10.2)
CHLORIDE SERPL-SCNC: 103 MMOL/L (ref 98–107)
CREAT SERPL-MCNC: 0.69 MG/DL (ref 0.67–1.17)
CREAT SERPL-MCNC: 0.71 MG/DL (ref 0.67–1.17)
DEPRECATED HCO3 PLAS-SCNC: 27 MMOL/L (ref 22–29)
EGFRCR SERPLBLD CKD-EPI 2021: >90 ML/MIN/1.73M2
EGFRCR SERPLBLD CKD-EPI 2021: >90 ML/MIN/1.73M2
ERYTHROCYTE [DISTWIDTH] IN BLOOD BY AUTOMATED COUNT: 12.5 % (ref 10–15)
GLUCOSE BLDC GLUCOMTR-MCNC: 74 MG/DL (ref 70–99)
GLUCOSE SERPL-MCNC: 92 MG/DL (ref 70–99)
HCT VFR BLD AUTO: 36.6 % (ref 40–53)
HGB BLD-MCNC: 12.9 G/DL (ref 13.3–17.7)
MCH RBC QN AUTO: 30.9 PG (ref 26.5–33)
MCHC RBC AUTO-ENTMCNC: 35.2 G/DL (ref 31.5–36.5)
MCV RBC AUTO: 88 FL (ref 78–100)
PLATELET # BLD AUTO: 157 10E3/UL (ref 150–450)
POTASSIUM SERPL-SCNC: 3.5 MMOL/L (ref 3.4–5.3)
PROT SERPL-MCNC: 7 G/DL (ref 6.4–8.3)
RBC # BLD AUTO: 4.17 10E6/UL (ref 4.4–5.9)
SODIUM SERPL-SCNC: 139 MMOL/L (ref 135–145)
WBC # BLD AUTO: 7.7 10E3/UL (ref 4–11)

## 2024-05-10 PROCEDURE — 80053 COMPREHEN METABOLIC PANEL: CPT | Performed by: INTERNAL MEDICINE

## 2024-05-10 PROCEDURE — 250N000013 HC RX MED GY IP 250 OP 250 PS 637: Performed by: PHYSICIAN ASSISTANT

## 2024-05-10 PROCEDURE — L8420 PROSTHETIC SOCK MULTI PLY BK: HCPCS

## 2024-05-10 PROCEDURE — 99222 1ST HOSP IP/OBS MODERATE 55: CPT | Performed by: ANESTHESIOLOGY

## 2024-05-10 PROCEDURE — 82565 ASSAY OF CREATININE: CPT | Performed by: ORTHOPAEDIC SURGERY

## 2024-05-10 PROCEDURE — 36415 COLL VENOUS BLD VENIPUNCTURE: CPT | Performed by: ORTHOPAEDIC SURGERY

## 2024-05-10 PROCEDURE — 99232 SBSQ HOSP IP/OBS MODERATE 35: CPT | Performed by: INTERNAL MEDICINE

## 2024-05-10 PROCEDURE — L5688 BK WAIST BELT WEBBING: HCPCS

## 2024-05-10 PROCEDURE — 85014 HEMATOCRIT: CPT | Performed by: ORTHOPAEDIC SURGERY

## 2024-05-10 PROCEDURE — 250N000013 HC RX MED GY IP 250 OP 250 PS 637

## 2024-05-10 PROCEDURE — 258N000003 HC RX IP 258 OP 636: Performed by: STUDENT IN AN ORGANIZED HEALTH CARE EDUCATION/TRAINING PROGRAM

## 2024-05-10 PROCEDURE — L5450 POSTOP APP NON-WGT BEAR DSG: HCPCS

## 2024-05-10 PROCEDURE — 250N000011 HC RX IP 250 OP 636: Performed by: STUDENT IN AN ORGANIZED HEALTH CARE EDUCATION/TRAINING PROGRAM

## 2024-05-10 PROCEDURE — 250N000013 HC RX MED GY IP 250 OP 250 PS 637: Performed by: STUDENT IN AN ORGANIZED HEALTH CARE EDUCATION/TRAINING PROGRAM

## 2024-05-10 PROCEDURE — 250N000013 HC RX MED GY IP 250 OP 250 PS 637: Performed by: INTERNAL MEDICINE

## 2024-05-10 RX ORDER — POLYETHYLENE GLYCOL 3350 17 G/17G
17 POWDER, FOR SOLUTION ORAL DAILY
Qty: 510 G | Refills: 0 | Status: SHIPPED | OUTPATIENT
Start: 2024-05-10 | End: 2024-05-16

## 2024-05-10 RX ORDER — ASPIRIN 81 MG/1
81 TABLET ORAL DAILY
Qty: 84 TABLET | Refills: 0 | Status: SHIPPED | OUTPATIENT
Start: 2024-05-10

## 2024-05-10 RX ORDER — SERTRALINE HYDROCHLORIDE 100 MG/1
200 TABLET, FILM COATED ORAL DAILY
Status: DISCONTINUED | OUTPATIENT
Start: 2024-05-10 | End: 2024-05-10 | Stop reason: HOSPADM

## 2024-05-10 RX ORDER — ASPIRIN 81 MG/1
81 TABLET ORAL 2 TIMES DAILY
Status: DISCONTINUED | OUTPATIENT
Start: 2024-05-10 | End: 2024-05-10 | Stop reason: HOSPADM

## 2024-05-10 RX ORDER — METHOCARBAMOL 500 MG/1
500 TABLET, FILM COATED ORAL 4 TIMES DAILY
Qty: 20 TABLET | Refills: 0 | Status: SHIPPED | OUTPATIENT
Start: 2024-05-10 | End: 2024-05-16

## 2024-05-10 RX ORDER — HYDROXYZINE HYDROCHLORIDE 25 MG/1
25 TABLET, FILM COATED ORAL EVERY 6 HOURS PRN
Qty: 30 TABLET | Refills: 0 | Status: SHIPPED | OUTPATIENT
Start: 2024-05-10

## 2024-05-10 RX ORDER — SERTRALINE HYDROCHLORIDE 100 MG/1
200 TABLET, FILM COATED ORAL DAILY
Status: DISCONTINUED | OUTPATIENT
Start: 2024-05-11 | End: 2024-05-10

## 2024-05-10 RX ORDER — LIDOCAINE 4 G/G
1-3 PATCH TOPICAL
Status: DISCONTINUED | OUTPATIENT
Start: 2024-05-10 | End: 2024-05-10 | Stop reason: HOSPADM

## 2024-05-10 RX ORDER — ASPIRIN 81 MG/1
81 TABLET ORAL 2 TIMES DAILY
Qty: 84 TABLET | Refills: 0 | Status: SHIPPED | OUTPATIENT
Start: 2024-05-10 | End: 2024-05-10

## 2024-05-10 RX ORDER — AMOXICILLIN 250 MG
1 CAPSULE ORAL 2 TIMES DAILY
Qty: 60 TABLET | Refills: 0 | Status: SHIPPED | OUTPATIENT
Start: 2024-05-10 | End: 2024-05-16

## 2024-05-10 RX ORDER — TRIAMTERENE AND HYDROCHLOROTHIAZIDE 37.5; 25 MG/1; MG/1
1 CAPSULE ORAL EVERY MORNING
Status: DISCONTINUED | OUTPATIENT
Start: 2024-05-10 | End: 2024-05-10 | Stop reason: HOSPADM

## 2024-05-10 RX ORDER — METHOCARBAMOL 500 MG/1
500 TABLET, FILM COATED ORAL 4 TIMES DAILY
Status: DISCONTINUED | OUTPATIENT
Start: 2024-05-10 | End: 2024-05-10 | Stop reason: HOSPADM

## 2024-05-10 RX ORDER — GABAPENTIN 600 MG/1
600 TABLET ORAL
Status: DISCONTINUED | OUTPATIENT
Start: 2024-05-10 | End: 2024-05-10 | Stop reason: HOSPADM

## 2024-05-10 RX ORDER — OXYCODONE HYDROCHLORIDE 10 MG/1
10 TABLET ORAL EVERY 4 HOURS PRN
Status: DISCONTINUED | OUTPATIENT
Start: 2024-05-10 | End: 2024-05-10 | Stop reason: HOSPADM

## 2024-05-10 RX ORDER — OXYCODONE HCL 10 MG/1
10 TABLET, FILM COATED, EXTENDED RELEASE ORAL EVERY 12 HOURS
Status: DISCONTINUED | OUTPATIENT
Start: 2024-05-10 | End: 2024-05-10

## 2024-05-10 RX ADMIN — METHOCARBAMOL 500 MG: 500 TABLET ORAL at 12:37

## 2024-05-10 RX ADMIN — KETOROLAC TROMETHAMINE 15 MG: 15 INJECTION, SOLUTION INTRAMUSCULAR; INTRAVENOUS at 14:22

## 2024-05-10 RX ADMIN — PROPRANOLOL HYDROCHLORIDE 40 MG: 40 TABLET ORAL at 14:22

## 2024-05-10 RX ADMIN — SERTRALINE HYDROCHLORIDE 200 MG: 100 TABLET ORAL at 09:18

## 2024-05-10 RX ADMIN — PROPRANOLOL HYDROCHLORIDE 40 MG: 40 TABLET ORAL at 07:48

## 2024-05-10 RX ADMIN — SENNOSIDES AND DOCUSATE SODIUM 1 TABLET: 50; 8.6 TABLET ORAL at 07:41

## 2024-05-10 RX ADMIN — ONDANSETRON 4 MG: 4 TABLET, ORALLY DISINTEGRATING ORAL at 12:29

## 2024-05-10 RX ADMIN — SODIUM CHLORIDE, POTASSIUM CHLORIDE, SODIUM LACTATE AND CALCIUM CHLORIDE: 600; 310; 30; 20 INJECTION, SOLUTION INTRAVENOUS at 04:04

## 2024-05-10 RX ADMIN — ASPIRIN 81 MG: 81 TABLET, COATED ORAL at 07:42

## 2024-05-10 RX ADMIN — OXYCODONE HYDROCHLORIDE 15 MG: 10 TABLET ORAL at 12:37

## 2024-05-10 RX ADMIN — HYDROMORPHONE HYDROCHLORIDE 0.4 MG: 0.2 INJECTION, SOLUTION INTRAMUSCULAR; INTRAVENOUS; SUBCUTANEOUS at 13:02

## 2024-05-10 RX ADMIN — KETOROLAC TROMETHAMINE 15 MG: 15 INJECTION, SOLUTION INTRAMUSCULAR; INTRAVENOUS at 03:10

## 2024-05-10 RX ADMIN — ROSUVASTATIN 40 MG: 20 TABLET, FILM COATED ORAL at 07:41

## 2024-05-10 RX ADMIN — CEFAZOLIN SODIUM 2 G: 2 INJECTION, SOLUTION INTRAVENOUS at 08:16

## 2024-05-10 RX ADMIN — SODIUM CHLORIDE, POTASSIUM CHLORIDE, SODIUM LACTATE AND CALCIUM CHLORIDE: 600; 310; 30; 20 INJECTION, SOLUTION INTRAVENOUS at 12:39

## 2024-05-10 RX ADMIN — OXYCODONE HYDROCHLORIDE 10 MG: 10 TABLET ORAL at 07:42

## 2024-05-10 RX ADMIN — TRIAMTERENE AND HYDROCHLOROTHIAZIDE 1 CAPSULE: 37.5; 25 CAPSULE ORAL at 11:38

## 2024-05-10 ASSESSMENT — ACTIVITIES OF DAILY LIVING (ADL)
ADLS_ACUITY_SCORE: 28
ADLS_ACUITY_SCORE: 26
ADLS_ACUITY_SCORE: 26
ADLS_ACUITY_SCORE: 28
ADLS_ACUITY_SCORE: 26
ADLS_ACUITY_SCORE: 28

## 2024-05-10 NOTE — PROGRESS NOTES
"Writer was given report that patient was wanting to discharge       VS: Patient refused all vital signs and all assessments. \"I don't need you to do any nursing for me I am going home\"   Output: Voiding adequate amounts w/o pain or difficulty using urinal   Activity: SBA/A1 into wheelchair. WC bound at baseline, stated he has his WC at home    Skin: Refused assessment, visible skin intact ex incision/catheter sites   Pain: Pt stated his block was not working and his pain was not being adequately managed. This RN tried offering education about consistently taking meds, using nonpharmacologic interventions and taking scheduled meds to get patient to stay but he adamantly refused. \"It is not an 'if' I am going, I AM going home\".    CMS: R- BKA   Dressing: BKA dressing CDI.    LDA: PIV removed before discharge. Block catheters removed by anesthesia before discharge   Additional Info:      DISCHARGE SUMMARY    Pt discharging to: Home   Transportation: Friend, car  AVS given and discussed: Yes attempted to explain to patient but patient kept cutting writer off stating he has done this before and know what he is doing, no further questions.   Stoplight Tool given and discussed: Yes, no further questions.  Medications given: N/A, discussed meds as patient would allow- meds sent to home pharmacy. No further questions.   Belongings returned: Yes, ensured all belongings packed and sent with pt. No items in security.   Comments: Escorted safely to elevators.       "

## 2024-05-10 NOTE — PROGRESS NOTES
Patient insisting on discharging home after removal of regional pain catheter  After removal, patient can go back to apixaban  5 mg BID daily and baby as aspirin daily, which was his home medications     Dr MARIO Nugent MD, [facility]  Hospitalist ( Internal medicine)  Pager: [phone]

## 2024-05-10 NOTE — PHARMACY-ADMISSION MEDICATION HISTORY
"Pharmacist Admission Medication History    Admission medication history is complete. The information provided in this note is only as accurate as the sources available at the time of the update.    Information Source(s): Patient, CareEverywhere/SureScripts, and medication history completed 4/24/24  via in-person    Pertinent Information: Patient did not want to discuss meds. He said he \"just wanted to be left alone.\" Medication history recently completed 4/24/24 by Allyssa CramerD. Per fill history and this note, no concerns for changes. Patient reports that absolutely nothing has changed.     Changes made to PTA medication list:  Added: None  Deleted: None  Changed: None    Allergies reviewed with patient and updates made in EHR: no    Medication History Completed By: Jayson Enriquez [facility] 5/9/2024 8:19 PM    PTA Med List   Medication Sig Last Dose    albuterol (PROAIR HFA/PROVENTIL HFA/VENTOLIN HFA) 108 (90 Base) MCG/ACT inhaler INHALE 2 PUFFS BY MOUTH EVERY 6 HOURS AS NEEDED FOR SHORTNESS OF BREATH OR WHEEZING More than a month    apixaban ANTICOAGULANT (ELIQUIS ANTICOAGULANT) 5 MG tablet Take 1 tablet (5 mg) by mouth 2 times daily 5/5/2024    aspirin 81 MG EC tablet Take 1 tablet (81 mg) by mouth daily for 360 days 5/8/2024 at 0900    Continuous Blood Gluc  (FREESTYLE CACHORRO 2 READER) GASTON USE TO READ BLOOD SUGARS AS PER 'S INSTRUCTIONS Unknown    doxazosin (CARDURA) 8 MG tablet Take 1 tablet (8 mg) by mouth at bedtime Past Week    gabapentin (NEURONTIN) 300 MG capsule Take 600 mg by mouth every evening Take 600 mg by mouth in the afternoon and 300 mg at bedtime Past Week    gabapentin (NEURONTIN) 300 MG capsule Take 300 mg by mouth at bedtime Take 600 mg by mouth in the afternoon and 300 mg at bedtime Past Week    insulin glargine (LANTUS SOLOSTAR) 100 UNIT/ML pen INJECT 26 UNITS SUBCUTANEOUSLY AT BEDTIME (Patient taking differently: Inject 26 Units Subcutaneous at bedtime INJECT 26 UNITS " SUBCUTANEOUSLY AT BEDTIME) 5/8/2024 at 2100    insulin pen needle (GLOBAL EASE INJECT PEN NEEDLES) 32G X 4 MM miscellaneous USE AS DIRECTED EVERY DAY Unknown    medical cannabis (Patient's own supply) See Admin Instructions (The purpose of this order is to document that the patient reports taking medical cannabis.  This is not a prescription, and is not used to certify that the patient has a qualifying medical condition.)   Flower - PRN 5/8/2024 at 2100    oxyCODONE IR (ROXICODONE) 10 MG tablet Take 1 tablet (10 mg) by mouth 3 times daily as needed for severe pain In addition to 5 mg four times daily dosing. 5/9/2024 at 0900    promethazine (PHENERGAN) 25 MG tablet TAKE 1 TABLET BY MOUTH 3 TIMES A DAY AS NEEDED FOR NAUSEA 5/8/2024 at 2100    propranolol (INDERAL) 40 MG tablet Take 1 tablet (40 mg) by mouth 3 times daily 5/8/2024 at 1600    rosuvastatin (CRESTOR) 20 MG tablet Take 40 mg by mouth daily 5/8/2024 at 0900    sertraline (ZOLOFT) 100 MG tablet TAKE 2 TABLETS BY MOUTH DAILY (Patient taking differently: TAKE 2 TABLETS BY MOUTH DAILY) 5/8/2024 at 0900    triamterene-HCTZ (DYAZIDE) 37.5-25 MG capsule Take 1 capsule by mouth every morning Past Week

## 2024-05-10 NOTE — CONSULTS
Inpatient Pain Management Service: Consultation      DATE OF CONSULT: May 10, 2024      REASON FOR PAIN CONSULTATION:  Erwin Aguilar is a 63 year old male I am seeing in consultation evaluation and recommendations for her pain condition.        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  He has a long history of chronic low back pain.  In early 1990s, he had lower back surgery subsequently in 2009 he had another surgery by Dr. Stein due to cauda equina syndrome.  In 2010, he underwent SCS by Dr. Garcia at [facility] (currently [facility]), which afforded significant pain relief.  He had revision of SCS  by Dr. Sauceda in 2018. As a sequela of the previous spine surgeries, he developed right foot drop.  He also had Achilles tendon repair in December 2020 and had a complicated recovery and needed wound VAC for 3 months postop. At some point, he was diagnosed with CRPS.  Then he had bony spur on the right ankle for which he had surgery.  Since then he developed right ankle/foot pain.  Pain became so severe  he wanted amputation of his leg. He is now s/p right below knee amputation (transtibial amputation by Dr. Nye. He has continuous infusion of femoral/sciatic catheter. He however reports intolerable pain.    Per PDMP review, he take oxycodone 10 mg 3 times a day and gabapentin.     Filled  Written  ID  Drug  QTY  Days  Prescriber  RX #  Dispenser  Refill  Daily Dose*  Pymt Type      05/08/2024 05/07/2024 2 Oxycodone Hcl (Ir) 10 Mg Tab 90.00 30 Deja Weg [id number] [facility] ([id number]) 0/0 45.00 [place]   04/22/2024 04/22/2024 2 Oxycodone Hcl (Ir) 10 Mg Tab 30.00 30 Deja Weg [id number] [facility] ([id number]) 0/0 15.00 [place]   04/13/2024 03/29/2024 2 Gabapentin 300 Mg Capsule 90.00 30 Ra Ban [id number] [facility] (5605) 0/1   [place]   04/08/2024 04/08/2024 2 Oxycodone Hcl (Ir) 5 Mg Tablet 120.00 30 Deja Kamilla [id number] [facility] ([id number]) 0/0 30.00 MME Medicare         TREATMENT RECOMMENDATIONS/PLAN:   - He has agreed to try Oxycodone 10-15 mg q4 hours prn with backup dilaudid 0.4 mg prn. If this is not adequate, would recommend to increase the dose to 15-20 mg q4 hours (at baseline he take oxy 10mg tid  - Apply Lidocaine Patch 5% to intact skin to cover the most painful area. Apply the prescribed number of patches (maximum of 3), only once for up to 12 hours.   - Continue other pain medications at their current doses       Thank you for consulting the Inpatient Pain Management Service.   The above recommendations are to be acted upon at the primary team s discretion.    To reach us:  Mon - Friday 8 AM - 3 PM: Pager [phone]   After hours, weekends and holidays: Primary service should call [phone] for the on-call pain specialist      REVIEW OF 10 BODY SYSTEMS: 10 point ROS of systems including Constitutional, Eyes, Respiratory, Cardiovascular, Gastroenterology, Genitourinary, Integumentary, Musculoskeletal, Psychiatric were all negative except for pertinent positives noted in my HPI.     Medications related to Pain Management:   Medications related to Pain Management (From now, onward)      Start     Dose/Rate Route Frequency Ordered Stop    05/12/24 0000  bisacodyl (DULCOLAX) suppository 10 mg         10 mg Rectal DAILY PRN 05/09/24 1918 05/12/24 0000  acetaminophen (TYLENOL) tablet 650 mg         650 mg Oral EVERY 4 HOURS PRN 05/09/24 1815 05/11/24 0000  magnesium hydroxide (MILK OF MAGNESIA) suspension 30 mL         30 mL Oral DAILY PRN 05/09/24 1918      05/10/24 2200  gabapentin (NEURONTIN) capsule 300 mg        Note to Pharmacy: PTA Sig:Take 300 mg by mouth 2 times daily Take 600 mg by mouth in the afternoon and 300 mg at bedtime      300 mg Oral AT BEDTIME 05/09/24 2115      05/10/24 1600  gabapentin (NEURONTIN) tablet  "600 mg         600 mg Oral DAILY AT 4 PM 05/10/24 0742      05/10/24 0930  Lidocaine (LIDOCARE) 4 % Patch 1-3 patch         1-3 patch  over 12 Hours Transdermal EVERY 24 HOURS 0800 05/10/24 0903      05/10/24 0930  methocarbamol (ROBAXIN) tablet 500 mg         500 mg Oral 4 TIMES DAILY 05/10/24 0904      05/10/24 0832  oxyCODONE (ROXICODONE) tablet 15 mg        Placed in \"Or\" Linked Group    15 mg Oral EVERY 4 HOURS PRN 05/10/24 0832      05/10/24 0832  oxyCODONE IR (ROXICODONE) tablet 10 mg        Placed in \"Or\" Linked Group    10 mg Oral EVERY 4 HOURS PRN 05/10/24 0832      05/10/24 0800  polyethylene glycol (MIRALAX) Packet 17 g         17 g Oral DAILY 05/09/24 1918      05/10/24 0230  acetaminophen (TYLENOL) tablet 650 mg         650 mg Oral EVERY 8 HOURS 05/09/24 2116 05/12/24 1829 05/09/24 2100  ketorolac (TORADOL) injection 15 mg        Note to Pharmacy: IF celecoxib was given pre-operatively, start ketorolac 12 hours after celecoxib given.    15 mg Intravenous EVERY 6 HOURS 05/09/24 1815 05/10/24 2059    05/09/24 2000  senna-docusate (SENOKOT-S/PERICOLACE) 8.6-50 MG per tablet 1 tablet         1 tablet Oral 2 TIMES DAILY 05/09/24 1918 05/09/24 2000  aspirin EC tablet 81 mg         81 mg Oral 2 TIMES DAILY 05/09/24 1918 05/09/24 1918  lidocaine 1 % 0.1-1 mL         0.1-1 mL Other EVERY 1 HOUR PRN 05/09/24 1918      05/09/24 1918  lidocaine (LMX4) cream          Topical EVERY 1 HOUR PRN 05/09/24 1918 05/09/24 1918  hydrOXYzine HCl (ATARAX) tablet 25 mg         25 mg Oral EVERY 6 HOURS PRN 05/09/24 1918      05/09/24 1914  methocarbamol (ROBAXIN) tablet 750 mg         750 mg Oral EVERY 6 HOURS PRN 05/09/24 1914      05/09/24 1815  HYDROmorphone (DILAUDID) injection 0.2 mg        Placed in \"Or\" Linked Group    0.2 mg Intravenous EVERY 2 HOURS PRN 05/09/24 1815      05/09/24 1815  HYDROmorphone (DILAUDID) injection 0.4 mg        Placed in \"Or\" Linked Group    0.4 mg Intravenous EVERY 2 HOURS PRN " 05/09/24 1815      05/09/24 1600  ROPivacaine 0.2% in sodium chloride 0.9% PERINEURAL infusion         7 mL/hr  Perineural Continuous Nerve Block 05/09/24 1537      05/09/24 1600  ROPivacaine 0.2% in sodium chloride 0.9% PERINEURAL infusion         7 mL/hr  Perineural Continuous Nerve Block 05/09/24 1537                  OUTPATIENT OPIOIDS PRESCRIBED BY:      PRIMARY CARE PROVIDER: Wegener, Joel Daniel Irwin    [facility] database reviewed:       HOME/PREVIOUS MEDICATIONS:   Prior to Admission medications    Medication Sig Start Date End Date Taking? Authorizing Provider   albuterol (PROAIR HFA/PROVENTIL HFA/VENTOLIN HFA) 108 (90 Base) MCG/ACT inhaler INHALE 2 PUFFS BY MOUTH EVERY 6 HOURS AS NEEDED FOR SHORTNESS OF BREATH OR WHEEZING 1/9/24  Yes Wegener, Joel Daniel Irwin, MD   apixaban ANTICOAGULANT (ELIQUIS ANTICOAGULANT) 5 MG tablet Take 1 tablet (5 mg) by mouth 2 times daily 1/9/24  Yes Wegener, Joel Daniel Irwin, MD   aspirin 81 MG EC tablet Take 1 tablet (81 mg) by mouth daily for 360 days 4/25/24 4/20/25 Yes Alejandra Doran, APRN CNP   Continuous Blood Gluc  (FREESTYLE CACHORRO 2 READER) GASTON USE TO READ BLOOD SUGARS AS PER 'S INSTRUCTIONS 1/12/24  Yes Wegener, Joel Daniel Irwin, MD   doxazosin (CARDURA) 8 MG tablet Take 1 tablet (8 mg) by mouth at bedtime 2/12/24  Yes Wegener, Joel Daniel Irwin, MD   gabapentin (NEURONTIN) 300 MG capsule Take 600 mg by mouth every evening Take 600 mg by mouth in the afternoon and 300 mg at bedtime   Yes Unknown, Entered By History   gabapentin (NEURONTIN) 300 MG capsule Take 300 mg by mouth at bedtime Take 600 mg by mouth in the afternoon and 300 mg at bedtime   Yes Unknown, Entered By History   insulin glargine (LANTUS SOLOSTAR) 100 UNIT/ML pen INJECT 26 UNITS SUBCUTANEOUSLY AT BEDTIME  Patient taking differently: Inject 26 Units Subcutaneous at bedtime INJECT 26 UNITS SUBCUTANEOUSLY AT BEDTIME 1/9/24  Yes Wegener, Joel Daniel Irwin, MD   insulin pen needle (GLOBAL  EASE INJECT PEN NEEDLES) 32G X 4 MM miscellaneous USE AS DIRECTED EVERY DAY 11/20/23  Yes Wegener, Joel Daniel Irwin, MD   medical cannabis (Patient's own supply) See Admin Instructions (The purpose of this order is to document that the patient reports taking medical cannabis.  This is not a prescription, and is not used to certify that the patient has a qualifying medical condition.)   Flower - PRN   Yes Reported, Patient   oxyCODONE IR (ROXICODONE) 10 MG tablet Take 1 tablet (10 mg) by mouth 3 times daily as needed for severe pain In addition to 5 mg four times daily dosing. 5/7/24  Yes Wegener, Joel Daniel Irwin, MD   promethazine (PHENERGAN) 25 MG tablet TAKE 1 TABLET BY MOUTH 3 TIMES A DAY AS NEEDED FOR NAUSEA 3/8/24  Yes Wegener, Joel Daniel Irwin, MD   propranolol (INDERAL) 40 MG tablet Take 1 tablet (40 mg) by mouth 3 times daily 3/8/24  Yes Wegener, Joel Daniel Irwin, MD   rosuvastatin (CRESTOR) 20 MG tablet Take 40 mg by mouth daily   Yes Unknown, Entered By History   sertraline (ZOLOFT) 100 MG tablet TAKE 2 TABLETS BY MOUTH DAILY  Patient taking differently: TAKE 2 TABLETS BY MOUTH DAILY 1/9/24  Yes Wegener, Joel Daniel Irwin, MD   triamterene-HCTZ (DYAZIDE) 37.5-25 MG capsule Take 1 capsule by mouth every morning 1/9/24  Yes Wegener, Joel Daniel Irwin, MD   ACE/ARB NOT PRESCRIBED, INTENTIONAL, ACE & ARB not prescribed due to Allergy 8/2/12   Ksenia Bean APRN CNP   baclofen (LIORESAL) 10 MG tablet TAKE 2 TABLETS BY MOUTH 3 TIMES A DAY 9/20/23   Wegener, Joel Daniel Irwin, MD         ALLERGIES:    Allergies   Allergen Reactions    Levaquin Anaphylaxis and Rash     Swelling in lip and tongue felt thick    Lisinopril Anaphylaxis     Swollen tongue; inability to swallow; drooling; hives; swollen face, neck, angioedema    Acetaminophen      Hx of cirrhosis     Amlodipine      Swelling, hives, possible angioedema      Morphine      b/p dropped and arms went numb  Hypotension    Quinolones Hives     Spironolactone Unknown     Pt believes himself to be allergic to it; cannot find his old records of this.-mjc     Bactrim [Sulfamethoxazole-Trimethoprim] Rash            PAST MEDICAL AND PSYCHIATRIC HISTORY:    Past Medical History:   Diagnosis Date    Actinic keratosis     aldara    Anxiety     Cancer (H)     squamous cell skin CA    Cauda equina spinal cord injury (H)     Chronic sinusitis 5-1-16    Depressive disorder     Diastasis recti     Esophageal reflux     Esophageal varices in cirrhosis (H) 4/1/2014    Hospitalized for UGI blee 3/28/14, endoscopy revealed bleeding varices.    Essential hypertension, benign     Intermittent asthma     Mild depression     Mixed hyperlipidemia     Nasal polyps 5-1-16    Osteoarthritis of lumbar spine, unspecified spinal osteoarthritis complication status     Other chronic pain     Paroxysmal atrial fibrillation (H) 9/22/2021    SCCA (squamous cell carcinoma) of skin     Seasonal allergic conjunctivitis     Type II or unspecified type diabetes mellitus without mention of complication, not stated as uncontrolled     Unspecified site of spinal cord injury without evidence of spinal bone injury     due to back surgery           PAST SURGICAL HISTORY:   Past Surgical History:   Procedure Laterality Date    ABDOMEN SURGERY  2014    AMPUTATE LEG BELOW KNEE Right 5/9/2024    Procedure: Right below knee amputation (transtibial amputation);  Surgeon: Kurtis Nye MD;  Location: [facility] OR    ANGIOGRAM Right 4/23/2024    Procedure: Ultrasound guided percutaneous transarterial left common femoral artery access, diagnostic aortoiliac angiogram, selective cannulation of right comon iliac, righ external iliac, right common femoral, and right superficial femoral artery, balloon angioplasty of right superficial femoral artery, 6mm x 12 cm self-expanding drug-coated stent placement of right superficial femoral artery, lef    BACK SURGERY  August 2009    COLONOSCOPY N/A 5/12/2016     Procedure: COMBINED COLONOSCOPY, SINGLE OR MULTIPLE BIOPSY/POLYPECTOMY BY BIOPSY;  Surgeon: Ana Paula Urbina MD;  Location: [facility] GI    DECOMPRESSION CUBITAL TUNNEL Left 8/31/2023    Procedure: LEFT CUBITAL TUNNEL RELEASE,;  Surgeon: Deny Dixon MD;  Location: [facility] OR    ENDOSCOPY UPPER, COLONOSCOPY, COMBINED  10/19/2011    Procedure:COMBINED ENDOSCOPY UPPER, COLONOSCOPY; Upper Endoscopy, Colonoscopy with Polypectomy x4; Surgeon:AMBAR RODRÍGUEZIQ; Location:[facility] OR    ENT SURGERY  1-2016    Ongoing since then    ESOPHAGOSCOPY, GASTROSCOPY, DUODENOSCOPY (EGD), COMBINED  3/28/2014    Procedure: COMBINED ESOPHAGOSCOPY, GASTROSCOPY, DUODENOSCOPY (EGD);  EGD, Gastric Biopsies, Esophageal Banding;  Surgeon: Reyna Tovar MD;  Location:  OR    ESOPHAGOSCOPY, GASTROSCOPY, DUODENOSCOPY (EGD), COMBINED  6/9/2014    Procedure: COMBINED ESOPHAGOSCOPY, GASTROSCOPY, DUODENOSCOPY (EGD);  Surgeon: Curtis Mendez MD;  Location:  GI    ESOPHAGOSCOPY, GASTROSCOPY, DUODENOSCOPY (EGD), COMBINED  7/24/2014    Procedure: COMBINED ESOPHAGOSCOPY, GASTROSCOPY, DUODENOSCOPY (EGD);  Surgeon: Gerard Samaniego MD;  Location:  OR    ESOPHAGOSCOPY, GASTROSCOPY, DUODENOSCOPY (EGD), COMBINED N/A 10/31/2014    Procedure: COMBINED ESOPHAGOSCOPY, GASTROSCOPY, DUODENOSCOPY (EGD);  Surgeon: Gerard Samaniego MD;  [facility]    ESOPHAGOSCOPY, GASTROSCOPY, DUODENOSCOPY (EGD), COMBINED N/A 5/12/2016    Procedure: COMBINED ESOPHAGOSCOPY, GASTROSCOPY, DUODENOSCOPY (EGD);  Surgeon: Ana Paula Urbina MD;  [facility]    ESOPHAGOSCOPY, GASTROSCOPY, DUODENOSCOPY (EGD), COMBINED N/A 8/2/2018    Procedure: COMBINED ESOPHAGOSCOPY, GASTROSCOPY, DUODENOSCOPY (EGD);  EGD;  Surgeon: Yu Wagner MD;  Location: [facility]    HCL SQUAMOUS CELL CARCINOMA AG  10/07    left forearm    HERNIORRHAPHY UMBILICAL  11/8/2012    Procedure: HERNIORRHAPHY UMBILICAL;  Open Umbilical Hernia Repair With Mesh ;  Surgeon:  Chase Nicholson MD;  Location: [facility] OR    INSERT STIMULATOR DORSAL COLUMN N/A 6/28/2018    Procedure: INSERT STIMULATOR DORSAL COLUMN;;  Surgeon: Elvis Sauceda MD;  [facility] OR    IR LOWER EXTREMITY ANGIOGRAM RIGHT  4/23/2024    neuro stimulator  2010    RELEASE CARPAL TUNNEL Left 8/31/2023    Procedure: LEFT OPEN CARPAL TUNNEL RELEASE,;  Surgeon: Deny Dixon MD;  Location: [facility] OR    RELEASE TRIGGER FINGER Left 8/31/2023    Procedure: Release left trigger finger thumb;  Surgeon: Deny Dixon MD;  Location: [facility] OR    REMOVE GENERATOR STIMULATOR (LOCATION) N/A 6/28/2018    Procedure: REMOVE GENERATOR STIMULATOR (LOCATION);  Spinal Cord Stimulator and IPG Explant and Re-Implant of SCS System (Leads and IPG);  Surgeon: Elvis Sauceda MD;  [facility]    REPAIR TENDON ACHILLES Right 11/11/2020    Procedure: Right achilles debridement and partial calcaneus excision;  Surgeon: Eh Sandoval MD;  Location: [facility] OR    SURGICAL HISTORY OF -   1/02    abcess tooth    SURGICAL HISTORY OF -   1999    L4-5 laminectomy, cauda equina syndrome    SURGICAL HISTORY OF -   +    herniated disk repair    TONSILLECTOMY  10 1964    TRANSPOSITION ULNAR NERVE (ELBOW)      right    ZZC APPENDECTOMY  1974           FAMILY HISTORY: family history includes Breast Cancer in his mother; Cancer in his father and mother; Diabetes in his brother, brother, and brother; Other Cancer in his mother; Snoring in his father.      HEALTH & LIFESTYLE PRACTICES:   Tobacco:  reports that he quit smoking about 39 years ago. His smoking use included cigarettes. He started smoking about 40 years ago. He has been exposed to tobacco smoke. He has never used smokeless tobacco.  Alcohol:  reports that he does not currently use alcohol.  Illicit drugs:  reports current drug use. Frequency: 2.00 times per week. Drug: Marijuana.      SOCIAL HISTORY:       LABORATORY VALUES:   Last Basic Metabolic Panel:  Lab  Results   Component Value Date     05/10/2024     04/09/2021      Lab Results   Component Value Date    POTASSIUM 3.5 05/10/2024    POTASSIUM 3.7 07/15/2022    POTASSIUM 3.9 04/09/2021     Lab Results   Component Value Date    CHLORIDE 103 05/10/2024    CHLORIDE 108 07/15/2022    CHLORIDE 109 04/09/2021     Lab Results   Component Value Date    AFRICA 8.8 05/10/2024    AFRICA 8.9 04/09/2021     Lab Results   Component Value Date    CO2 27 05/10/2024    CO2 24 07/15/2022    CO2 27 04/09/2021     Lab Results   Component Value Date    BUN 8.1 05/10/2024    BUN 12 07/15/2022    BUN 7 04/09/2021     Lab Results   Component Value Date    CR 0.69 05/10/2024    CR 0.71 05/10/2024    CR 0.68 04/09/2021     Lab Results   Component Value Date    GLC 92 05/10/2024    GLC 74 05/10/2024     07/15/2022     04/09/2021       CBC results:  Lab Results   Component Value Date    WBC 7.7 05/10/2024    WBC 6.5 04/09/2021     Lab Results   Component Value Date    HGB 12.9 05/10/2024    HGB 14.1 04/09/2021     Lab Results   Component Value Date    HCT 36.6 05/10/2024    HCT 40.6 04/09/2021     Lab Results   Component Value Date     05/10/2024     04/09/2021       DIAGNOSTIC TESTS:       Labs above reviewed as well as additional relevant diagnostic studies from the EPIC record.       PHYSICAL EXAMINATION:  VITAL SIGNS:  B/P: 162/67, T: 97.6, P: 70, R: 16    CONSTITUTIONAL/GENERAL APPEARANCE: Alert, interactive, & in no significant discomfort  EYES: anicteric  ENT/NECK: atraumatic  RESPIRATORY: non-labored breathing   CARDIOVASCULAR: regular rate and rhythm   MUSCULOSKELETAL/BACK/SPINE/EXTREMITIES: moves all extremities    GAIT: not checked  NEURO:  Alert oriented   Abdomen: Soft  SKIN/VASCULAR EXAM:  No jaundice or rash  PSYCHIATRIC/BEHAVIORAL/OBSERVATIONS:  No objective signs of pain observed during our interview.   Extremities: No LE edema or obvious joint abnormalities        Carlito Grimm MD, PhD    M  [facility] MED SURG ORTHOPEDIC  [address] [id number] [phone]  Dept: [phone]      Update at 3pm    Primary service discharged patient and requested to remove nerve block catheters. Catheters removed with tip intact.

## 2024-05-10 NOTE — PROGRESS NOTES
S:  Patient seen at [facility] with an order for erin [employer] ordered by Dr. richardson.       Patient states: The amputation was planned due to a bone spur that had ruptured his achilis tendon and never fully healed  Patient reported functional abilities prior to amputation: didnt walk much due to the other medial problems asscoaited with the foot that was amputeated but used to walk golfing regualrly.  O:   Visual inspection: patients limb wrapped in ace bandage  Measurement of limb: n/a  Products and sizes dispensed: rt [employer]Color 9 [id number]  Prosthetic packet and business card left with patient.    A:  Patient is a good candidate for:  Prosthetic managament  The goal of the new prosthesis is to:  allow the patient to ambulate independently  Potential K level:  low k3  Motivation for prosthetic device: very high  P:  Anticipated DC is 5/11 to home  Will contact patient 1 week from today to check on status.    This note has been electronically signed by Jaren Garcia CPO, LPO.

## 2024-05-10 NOTE — PROGRESS NOTES
[facility]    Medicine Progress Note - Hospitalist Service, GOLD TEAM 18    Date of Admission:  5/9/2024    Assessment & Plan   Erwin Aguilar is a 63M w/ PMH poorly controlled diabetes mellitus with microvascular changes, cirrhosis C/B esophageal varices, paroxysmal A-fib, history of cauda equina, HTN, HLD, asthma admitted on 5/9/2024 s/p right BKA    Status post right below-knee amputation (transtibial)  History of right Achilles infection with chronic nonhealing open wound and dehiscence  Right superficial femoral artery stenosis  Chronic pain syndrome  Surgery with Dr. Nye. EBL 50 cc.    - care per primary orthopedics team:   - FEN: ADAT to regular diet    - periop abx: Ancef   - pain mng:    - APAP 9 7 5 mg every 8 hours    -Ketorolac 15 mg every 6 hours for 4 doses    -Ropivacaine regional analgesia     - oxycodone 10-15 mg every 4 hours as needed    -dilaudid 0.2-0.4 mg every 2 hours as needed   - PT and OT consults   -activity:  NWB RLE    - DVT ptx:  Aspirin BID for now, Resume apixaban 5mg BID as surgically safe to resume per primary team after regional analgesia catheters are removed    - follow up with Dr. Nye in clinic 2 weeks for wound check, sutures to remain 3-4 weeks    Diabetes mellitus, insulin-dependent (recent A1c 5.9 on 1/9/2024)  Recent Labs   Lab 05/09/24  1629 05/09/24  1333   GLC 94 88     - Continue Lantus 26 units at bedtime  - mISS, trend glucose ACHS, hypoglycemia protocol    Cirrhosis C/B esophageal varices  History of alcohol abuse in remission  Remote GI bleed  MELD as well as meld-NA score of 8 per most recent available labs.  Follow[facility] and was last seen 8/2023.  Last upper endoscopy in 2018 with portal hypertensive gastropathy, hematin found in stomach, normal esophagus.   -  CMP within normal limits   -Monitor for bleeding    Paroxysmal A-fib  -Resume apixaban when safe from a surgical standpoint  "after regional analgesia catheters are removed (surgically safe to restart once the cath is removed)   -Resume PTA propranolol    History of cauda equina (1999) s/p decompression - monitor    Moderate intermittent asthma-continue PTA albuterol HFA    HTN  BP currently mildly elevated in the postop setting  -Resume  PTA triamterene HCTZ   -Continue PTA propranolol 40 mg 3 times daily  -Resume PTA doxazosin with hold parameters    HLD-continue PTA statin    Depression-continue PTA sertraline          Diet: Advance Diet as Tolerated: Regular Diet Adult    DVT Prophylaxis: Aspirin BID   Rodriguez Catheter: Not present  Lines: None     Cardiac Monitoring: None  Code Status: Full Code      Clinically Significant Risk Factors Present on Admission          # Hypocalcemia: Lowest Ca = 8.4 mg/dL in last 2 days, will monitor and replace as appropriate      # Drug Induced Coagulation Defect: home medication list includes an anticoagulant medication  # Drug Induced Platelet Defect: home medication list includes an antiplatelet medication   # Hypertension: Noted on problem list      # Overweight: Estimated body mass index is 29.1 kg/m  as calculated from the following:    Height as of this encounter: 1.778 m (5' 10\").    Weight as of this encounter: 92 kg (202 lb 13.2 oz).       # Asthma: noted on problem list        Disposition Plan     Medically Ready for Discharge: Anticipated in 2-4 Days             Raffi Corley MD  Hospitalist Service, GOLD TEAM 18  M [facility]  Securely message with [employer] (more info)  Text page via [facility] Paging/Directory   See signed in provider for up to date coverage information  ______________________________________________________________________    Interval History    Charts reviewed and patient was examined. Ashok tori have a feeling of phantom limb with pain at the operative site. Wants his oral pain meds to be changed to oxycodone, with " dose increased. Typically takes 10 mg Oxycodone tablets 3 times at home   No fevers or chills  Eating and drinking well     Physical Exam   Vital Signs: Temp: 97.6  F (36.4  C) Temp src: Oral BP: (!) 162/67 Pulse: 70   Resp: 16 SpO2: 95 % O2 Device: None (Room air) Oxygen Delivery: 4 LPM  Weight: 202 lbs 13.17 oz    General Appearance: Awake, alert and not in distress  Respiratory: Clear breath sounds bilaterally   Cardiovascular: Normal heart sounds. No murmurs   GI: Soft, non tender. Normal bowel sounds   Skin: No bruising or bleeding   MSK: Rt BKA stump with intact dressings.   Other:Awake, alert and orientated X 3      Medical Decision Making       40 MINUTES SPENT BY ME on the date of service doing chart review, history, exam, documentation & further activities per the note.  MANAGEMENT DISCUSSED with the following over the past 24 hours: Patient, bedside RN and care management team        Data

## 2024-05-10 NOTE — PLAN OF CARE
"  VS: BP (!) 144/63   Pulse 59   Temp 98  F (36.7  C) (Oral)   Resp 18   Ht 1.778 m (5' 10\")   Wt 92 kg (202 lb 13.2 oz)   SpO2 96%   BMI 29.10 kg/m     O2: To room air, denies numbness and tingling   Output: Voided 2x, with total of 500 ml using urinals   Last BM: 5-   Activity: Not OOB, can change position, turning side to side independently   Skin: Scattered peeling, scab and rashes on BUE and BLE   Pain: On PRN and continuous nerve block (See MAR)   CMS: A&Ox4, with baseline numbness and tingling on LLE   Dressing: RLE CDI   Diet: Regular diet, refused to eat   LDA: PIV on (R) upper forearm, 2 nerve block insertion sites on (R) thigh   Equipment: Personal items, call light and IV pole   Plan: Pending consults to PT, OT and prosthetic IP   Additional Info: Blood has been drawn by  and a new order for lab been made, pt refused to re-draw another sample, suggested to lab to do it together with morning draw; pt refused cannula to be placed on him as pt is claustrophobic; refused also BP cuff to keep on place for monitoring, placed BP cuff when needed to check.       "

## 2024-05-10 NOTE — PLAN OF CARE
Goal Outcome Evaluation:      Plan of Care Reviewed With: patient    Overall Patient Progress: no changeOverall Patient Progress: no change     Pt is post op day 1. Pt has not been OOB. Pt has 2 nerve blocks, and complained of pain which was managed by scheduled IV toradol. Pt declined tylenol. Pt voiding adequately through the night,  using a urinal. Baseline numbness and tingling.     Last BM was on the 9th. Pt is diabetic and has blood sugars. Pt currently awake. Continue with POC.

## 2024-05-10 NOTE — PROGRESS NOTES
"Orthopaedic Surgery Progress Note 05/10/2024    S: DAWN, AF, VSS. States pain is quite well controlled. Tolerating PO. Voiding. No OOB yet. Discussed discharge plan. Anticipate home with wife tomorrow. States he has been actively using a wheelchair for some time so he does not anticipate any issues w PT.     O:  Temp: 98  F (36.7  C) Temp src: Oral BP: (!) 144/63 Pulse: 59   Resp: 18 SpO2: 96 % O2 Device: None (Room air) Oxygen Delivery: 4 LPM    Exam:  Gen: No acute distress, resting comfortably in bed.  Resp: Non-labored breathing  MSK:  RLE:  - Dressings c/d/i  - Stump wwp, cap refill <2s    No lab results found in last 7 days.    Invalid input(s): \"SEDRATE\"    Assessment and Plan: Erwin Aguilar is a 63 year old male now s/p Right BKA on 5/9/2024 with Dr. Nye.      Ortho Primary  Activity: Up with assist until independent  Weight bearing status: NWB RLE  Pain management:   Transition from IV to PO narcotics as tolerated.   Antibiotics: Antibtioics x 24 hours.  Diet: Begin with clear fluids and progress diet as tolerated.   DVT prophylaxis: ASA 162mg daily x6 weeks. SCDs  Imaging: None  Labs: Hgb POD 1  Bracing/Splinting: Stump protector  Dressings: Keep dressing c/d/I until follow-up.   Physical Therapy/Occupational Therapy: Eval and treat  Consults: Hospitalist  Follow-up: Clinic with Dr. Nye in 2 weeks for wound check. Sutures to remain in place for at least 3-4 weeks.  Disposition: Pending progress with therapies, pain control on orals, and medical stability, anticipate discharge to home on POD #2-4.    Kurtis Hernadez MD  Orthopaedic Surgery PGY-4    "

## 2024-05-10 NOTE — PLAN OF CARE
"Goal Outcome Evaluation:    VS: Blood pressure (!) 162/67, pulse 70, temperature 97.6  F (36.4  C), resp. rate 16, height 1.778 m (5' 10\"), weight 92 kg (202 lb 13.2 oz), SpO2 95%.   O2: RA   Output: Continent, uses urinal at bedside   Last BM: 5/9   Activity: Ax1 using cane/wheelchair, not OOB this shift   Skin: R BKA   Pain: Rates 10/10 pain. Managed by around the clock PRN oxycodone, IV dilaudid, and scheduled Toradol.   CMS: A&O x 4, baseline n/t   Dressing: R BKA, ACE wrapped, CDI   Diet: Regular, BG checks before meal   LDA: R PIV infusing LR @ 125 ml/hr   Equipment: IV pole, cane, commode, urinal, personal belongings.    Plan: Continue POC.   Additional Info: Pt reports only short-term relief with current pain regiment. Ortho was informed. Pain consult was completed. Pt states wanting to go home.          "

## 2024-05-10 NOTE — CONSULTS
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 Nye. EBL 50 cc.  Epidural with block  - care per primary orthopedics team:   - FEN: ADAT to regular diet    - periop abx: Ancef   - pain mng:    - APAP 9 7 5 mg every 8 hours    -Ketorolac 15 mg every 6 hours for 4 doses    -Ropivacaine epidural    - oxycodone 5-10 mg every 4 hours as needed    -dilaudid 0.2-0.4 mg every 2 hours as needed   - PT and OT consults   -activity:  NWB RLE    - DVT ptx: Aspirin 162 mg x 6 weeks   - follow up with Dr. Nye in clinic 2 weeks for wound check, sutures to remain 3-4 weeks    Diabetes mellitus, insulin-dependent (recent A1c 5.9 on 1/9/2024)  Recent Labs   Lab 05/09/24  1629 05/09/24  1333   GLC 94 88     - Continue Lantus 26 units at bedtime  - mISS, trend glucose ACHS, hypoglycemia protocol    Cirrhosis C/B esophageal varices  History of alcohol abuse in remission  Remote GI bleed  MELD as well as meld-NA score of 8 per most recent available labs.  Follows with [facility] and was last seen 8/2023.  Last upper endoscopy in 2018 with portal hypertensive gastropathy, hematin found in stomach, normal esophagus.   - check CMP  -Monitor for bleeding    Paroxysmal A-fib  -Resume apixaban when safe from a surgical standpoint  -Resume PTA propranolol    History of cauda equina (1999) s/p  "decompression - monitor    Moderate intermittent asthma-continue PTA albuterol HFA    HTN  BP currently mildly elevated in the postop setting  -Hold PTA triamterene HCTZ for now, likely resume in a.m. provided normal kidney function  -Continue PTA propranolol 40 mg 3 times daily  -Resume PTA doxazosin with hold parameters    HLD-continue PTA statin    Depression-continue PTA sertraline     The patient's care was discussed with the Bedside Nurse, Patient, and communicated via this note to the primary Team.    Clinically Significant Risk Factors Present on Admission               # Drug Induced Coagulation Defect: home medication list includes an anticoagulant medication  # Drug Induced Platelet Defect: home medication list includes an antiplatelet medication   # Hypertension: Noted on problem list      # Overweight: Estimated body mass index is 29.1 kg/m  as calculated from the following:    Height as of this encounter: 1.778 m (5' 10\").    Weight as of this encounter: 92 kg (202 lb 13.2 oz).       # Asthma: noted on problem list        Sheila Drake PA-C  Hospitalist Service, GOLD TEAM   Securely message with Vocera (more info)  Text page via [facility] Paging/Directory   See signed in provider for up to date coverage information  ______________________________________________________________________    Chief Complaint   POD #0 s/p right BKA    History is obtained from the patient and the patient's chart    History of Present Illness   Erwin Aguilar is a 63M w/ PMH poorly controlled diabetes mellitus with microvascular changes, cirrhosis C/B esophageal varices, paroxysmal A-fib, history of cauda equina, HTN, HLD, asthma admitted on 5/9/2024 s/p right BKA.    Patient is seen on the floor and does not endorse any specific physical complaints.  Patient states that he is frustrated with having another lab draw and sites several other frustrations with his care previously.  Patient states that he would like to rest and " does not wish to be bothered at this time.  History from patient at this time is very limited.  He does not endorse having any overt signs of infection or chest symptoms and does not endorse that his pain is uncontrolled.      Past Medical History    Past Medical History:   Diagnosis Date    Actinic keratosis     aldara    Anxiety     Cancer (H)     squamous cell skin CA    Cauda equina spinal cord injury (H)     Chronic sinusitis 5-1-16    Depressive disorder     Diastasis recti     Esophageal reflux     Esophageal varices in cirrhosis (H) 4/1/2014    Hospitalized for UGI blee 3/28/14, endoscopy revealed bleeding varices.    Essential hypertension, benign     Intermittent asthma     Mild depression     Mixed hyperlipidemia     Nasal polyps 5-1-16    Osteoarthritis of lumbar spine, unspecified spinal osteoarthritis complication status     Other chronic pain     Paroxysmal atrial fibrillation (H) 9/22/2021    SCCA (squamous cell carcinoma) of skin     Seasonal allergic conjunctivitis     Type II or unspecified type diabetes mellitus without mention of complication, not stated as uncontrolled     Unspecified site of spinal cord injury without evidence of spinal bone injury     due to back surgery       Past Surgical History   Past Surgical History:   Procedure Laterality Date    ABDOMEN SURGERY  2014    ANGIOGRAM Right 4/23/2024    Procedure: Ultrasound guided percutaneous transarterial left common femoral artery access, diagnostic aortoiliac angiogram, selective cannulation of right comon iliac, righ external iliac, right common femoral, and right superficial femoral artery, balloon angioplasty of right superficial femoral artery, 6mm x 12 cm self-expanding drug-coated stent placement of right superficial femoral artery, lef    BACK SURGERY  August 2009    COLONOSCOPY N/A 5/12/2016    Procedure: COMBINED COLONOSCOPY, SINGLE OR MULTIPLE BIOPSY/POLYPECTOMY BY BIOPSY;  Surgeon: Ana Paula Urbina MD;   Location: [facility]    DECOMPRESSION CUBITAL TUNNEL Left 8/31/2023    Procedure: LEFT CUBITAL TUNNEL RELEASE,;  Surgeon: Deny Dixon MD;  Location: [facility]    ENDOSCOPY UPPER, COLONOSCOPY, COMBINED  10/19/2011    Procedure:COMBINED ENDOSCOPY UPPER, COLONOSCOPY; Upper Endoscopy, Colonoscopy with Polypectomy x4; Surgeon:AMBAR RODRÍGUEZ; Location:[facility]    ENT SURGERY  1-2016    Ongoing since then    ESOPHAGOSCOPY, GASTROSCOPY, DUODENOSCOPY (EGD), COMBINED  3/28/2014    Procedure: COMBINED ESOPHAGOSCOPY, GASTROSCOPY, DUODENOSCOPY (EGD);  EGD, Gastric Biopsies, Esophageal Banding;  Surgeon: Reyna Tovar MD;  Location: [facility]    ESOPHAGOSCOPY, GASTROSCOPY, DUODENOSCOPY (EGD), COMBINED  6/9/2014    Procedure: COMBINED ESOPHAGOSCOPY, GASTROSCOPY, DUODENOSCOPY (EGD);  Surgeon: Curtis Mendez MD;  Location: [facility]    ESOPHAGOSCOPY, GASTROSCOPY, DUODENOSCOPY (EGD), COMBINED  7/24/2014    Procedure: COMBINED ESOPHAGOSCOPY, GASTROSCOPY, DUODENOSCOPY (EGD);  Surgeon: Gerard Samaniego MD;  Location: [facility]    ESOPHAGOSCOPY, GASTROSCOPY, DUODENOSCOPY (EGD), COMBINED N/A 10/31/2014    Procedure: COMBINED ESOPHAGOSCOPY, GASTROSCOPY, DUODENOSCOPY (EGD);  Surgeon: Gerard Samaniego MD;  Location: [facility]    ESOPHAGOSCOPY, GASTROSCOPY, DUODENOSCOPY (EGD), COMBINED N/A 5/12/2016    Procedure: COMBINED ESOPHAGOSCOPY, GASTROSCOPY, DUODENOSCOPY (EGD);  Surgeon: Ana Paula Urbina MD;  Location: [facility]    ESOPHAGOSCOPY, GASTROSCOPY, DUODENOSCOPY (EGD), COMBINED N/A 8/2/2018    Procedure: COMBINED ESOPHAGOSCOPY, GASTROSCOPY, DUODENOSCOPY (EGD);  EGD;  Surgeon: Yu Wagner MD;  Location: [facility]    HCL SQUAMOUS CELL CARCINOMA AG  10/07    left forearm    HERNIORRHAPHY UMBILICAL  11/8/2012    Procedure: HERNIORRHAPHY UMBILICAL;  Open Umbilical Hernia Repair With Mesh ;  Surgeon: Chase Nicholson MD;  Location: [facility]    INSERT STIMULATOR DORSAL COLUMN N/A 6/28/2018    Procedure: INSERT STIMULATOR DORSAL  "COLUMN;;  Surgeon: Elvis Sauceda MD;  [facility]    IR LOWER EXTREMITY ANGIOGRAM RIGHT  4/23/2024    neuro stimulator  2010    RELEASE CARPAL TUNNEL Left 8/31/2023    Procedure: LEFT OPEN CARPAL TUNNEL RELEASE,;  Surgeon: Deny Dixon MD;  Location: [facility]    RELEASE TRIGGER FINGER Left 8/31/2023    Procedure: Release left trigger finger thumb;  Surgeon: Deny Dixon MD;  Location: [facility]    REMOVE GENERATOR STIMULATOR (LOCATION) N/A 6/28/2018    Procedure: REMOVE GENERATOR STIMULATOR (LOCATION);  Spinal Cord Stimulator and IPG Explant and Re-Implant of SCS System (Leads and IPG);  Surgeon: Elvis Sauceda MD;  [facility]    REPAIR TENDON ACHILLES Right 11/11/2020    Procedure: Right achilles debridement and partial calcaneus excision;  Surgeon: Eh Sandoval MD;  Location: [facility]    SURGICAL HISTORY OF -   1/02    abcess tooth    SURGICAL HISTORY OF -   1999    L4-5 laminectomy, cauda equina syndrome    SURGICAL HISTORY OF -   +    herniated disk repair    TONSILLECTOMY  10 1964    TRANSPOSITION ULNAR NERVE (ELBOW)      right    ZZC APPENDECTOMY  1974       Medications   Attempted to review medications with patient he states \"it is in my chart\"       Review of Systems    The 10 point Review of Systems is limited due to patient participation and is as per HPI     Physical Exam   Vital Signs: Temp: 97.2  F (36.2  C) Temp src: Axillary BP: (!) 156/73 Pulse: 55   Resp: 18 SpO2: 93 % O2 Device: None (Room air) Oxygen Delivery: 4 LPM  Weight: 202 lbs 13.17 oz  GENERAL: Alert and oriented. NAD.  Supine with head of bed at 30 degrees. Cooperative.   HEENT: Anicteric sclera. NC. AT.  Pupils equal and round  CV: RRR. S1, S2. No murmurs appreciated.   RESPIRATORY: Effort normal on 4 LNC lungs CTAB with no wheezing, rales, rhonchi.   GI: Abdomen soft, NT, NABS  NEUROLOGICAL: No focal deficits. Moves all extremities.  CN 2-12 grossly intact.  EXTREMITIES: Right BKA site " CDI with Ace wrap's intact to the mid thigh.  Positive PT pulse of the left foot and no peripheral edema.    SKIN: No jaundice. No rashes on exposed skin.      Medical Decision Making       50 MINUTES SPENT BY ME on the date of service doing chart review, history, exam, documentation & further activities per the note.      Data

## 2024-05-11 NOTE — OP NOTE
DATE OF PROCEDURE:  05/09/2024.    PREOPERATIVE DIAGNOSIS:  Chronic right foot pain, dysfunction, dysvascularity, and nonhealing wound.    POSTOPERATIVE DIAGNOSIS:  Chronic right foot pain, dysfunction, dysvascularity, and nonhealing wound.     PROCEDURE:  Right transtibial amputation (right below knee amputation).     PRIMARY SURGEON:  Kurtis Nye MD.     ASSISTANT:  Kurtis Hernadez MD.     ANESTHESIA:  General endotracheal plus right sciatic and femoral nerve blocks and continuous infusion nerve catheters.    COMPLICATIONS:  None apparent intraoperatively or immediately postoperatively.     IMPLANTS:  None.     SIGNIFICANT FINDINGS:  Viable appearing amputation margins without visible necrosis or infection at the level of amputation.  Calf muscles with chronic fatty atrophic appearance.    SPECIMENS:  Right transtibial amputation to pathology.    DRAINS:  None.    TOURNIQUET TIME:  50 minutes at 250 mm Hg.    ESTIMATED BLOOD LOSS:  50 mL.      INDICATIONS:  Erwin Aguilar is a 63-year-old male patient who presented requesting a right below knee amputation due to a history of intractable chronic pain of the right lower extremity, dysfunction, dysvascularity, and a chronic nonhealing wound over the Achilles.  He has had multiple previous conservative and surgical treatments.  This included a recent revascularization of the right lower extremity for superficial femoral artery stenosis.  After a thorough discussion, the patient confirmed his wish to proceed with a right transtibial amputation.  The nature of the planned procedure, the risks, benefits, and alternatives, the postoperative plan, and realistic expectations for short and long term outcome were all discussed in layman's terms.  No guarantees were expressed or implied as to outcome.  The patient had the opportunity to have all the questions at the time asked and answered appropriately, verbalized understanding of this discussion, and verbalized the wish  Requested Medications      topiramate (TOPAMAX) 200 MG tablet         Sig: Take 1 tablet by mouth at bedtime.    Disp:  30 tablet    Refills:  5    Start: 8/4/2019    Class: Eprescribe    Non-formulary    Last ordered: 6 months ago by Jose Alfredo Rolon MD Last dispensed: 7/11/2019    Rx #: [id number]       To be filled at: [facility] [address] [place] [address]        LOV: 7/12/19  Pending appointment: 10/15/19  Reviewed chart.  Refilled per protocol.     to proceed with the planned procedure.  Written informed consent was obtained.     DESCRIPTION OF PROCEDURE:             The patient, Erwin Aguilar, was met in the preoperative area where the correct surgical site was identified with patient participation and marked by the primary surgeon.  All questions were answered.  The regional anesthesia team performed sciatic and femoral nerve blocks on the operative lower extremity with indwelling continuous infusion catheters.  The patient was then brought to the operating room and carefully positioned in the supine position on the operating room table with all bony prominences well padded and a safety strap across the waist.  The anesthesiology team induced satisfactory general anesthesia and placed an endotracheal tube.  Cefazolin was given IV per protocol.  A tourniquet was placed proximal to the surgical site on the thigh of the operative lower extremity.  A soft bump was placed under the hip of the operative lower extremity to keep the patella pointing upwards.  The patient's operative lower extremity was then prepped and draped in the usual sterile fashion, with a stockinette and Coban covering all skin on the ankle and foot.  Prior to proceeding with the operation a timeout was held per hospital policy correctly identifying the patient, procedure to be performed, and operative site including laterality.  All in the room agreed.    The appropriate flap for a transtibial amputation with posterior flap and with an appropriate, adequate length residual tibia, was marked with a pen on the operative lower extremity, which was then gravity exsanguinated, and the tourniquet was inflated to 250 mm Hg.      Prior to making incision on the operative lower extremity the primary surgeon's initials were visualized at the surgical site.  The skin was incised along the flap marking, beginning with a transverse incision anteriorly, which extended medially and laterally until a point  care home of the width in the anteroposterior direction of the leg at that level.  At that point, the medial and lateral incisions then turned 90 degrees distally far enough to include an adequate length for a flap coverage of the wound without including any visible nonviable tissue or wounds in the foot/ankle region.  Careful dissection was performed through the subjacent subcutaneous tissues.  Meticulous hemostasis was obtained with electrocautery.  Medially, the incision was taken directly and sharply to bone along the medial face of the tibia.  The saphenous vein was ligated and the saphenous nerve was sharply cut and allowed to retract proximally into the soft tissues.  Laterally, layer by layer dissection was performed through the anterior and lateral compartment musculature.  The anterior tibial artery and vein were clearly visualized, and appropriately ligated.  The deep peroneal nerve was retracted, injected with 0.5% marcaine with epinephrine, sharply divided, and allowed to retract proximally into the soft tissues.  Dissection was carefully continued until the lateral face of the tibia, interosseous membrane, and fibula were directly exposed. The posterior face of the tibia was carefully subperiosteally elevated and retractors were placed directly against bone.  Careful circumferential dissection was performed around the tibia subperiosteally to a level approximately 2 cm proximal to the distal margin of the anterior skin to be retained.  Next, while taking care to protect all surrounding soft tissues with gentle blunt retraction, an oscillating saw was used to osteotomize the tibia transversely perpendicular to the long axis of the tibia.  Next, the fibula was carefully circumferentially subperiosteally dissected with an elevator to a level approximately 1 cm proximal to the tibial osteotomy.  Again taking care to protect all surrounding soft tissues, the saw was used to osteotomize the fibula.  A large  bump was placed underneath the proximal tibia, and the leg was flexed 90 degrees through the tibial and fibular osteotomy sites.  Meticulous Bovie dissection followed by a Torie knife were used to carefully dissect all posterior soft tissues subperiosteally from the posterior faces of the tibia and fibula, directly off of bone, while taking care to preserve the integrity of the posterior flap.  A bone hook pulling anteriorly on the distal tibia and fibula aided in this process.  Next, once the dissection reached the distalmost end of the planned posterior flap, a skin knife was then used to transect the tip of the posterior flap from the foot and ankle distally.  This was around the region of the distal gastrocnemius and well proximal to the patient's Achilles wound.  The amputated left distal leg, foot, and ankle were then passed off the field as a specimen.  Although the gastrocnemius and soleus muscles appeared to show signs of chronic fatty atrophy consistent with denervation, all soft tissues at the level of the amputation site appeared to otherwise be viable and healthy without visible infection or open wounds.  The saw was then used to bevel the anterior distal cortex of the residual tibia to remove the sharp corner, as well as to bevel and smooth all remaining edges of the distal tibia until they were all palpably smooth.  The distal margin of the posterior skin flap was able to be approximated easily to the anterior skin margin in terms of its length, but the bulk of the residual posterior soft tissues added somewhat more tension than optimal, so at this point it was debulked to allow tension-free closure.  This was done in a stepwise process, assessing at multiple points during the process whether the flap was able to be closed, and stopping once a tension-free closure appeared possible.  The deep posterior compartment and portions of the lateral compartment muscles were debrided.  During this process  the posterior neurovascular structures were visualized and securely ligated with multiple 0-silk sutures.  The superficial posterior compartment musculature including both soleus and gastrocnemius were left mostly in place, with the soleus partially debrided along its deep surface.  The majority of the soleus muscle appeared to have chronic fatty atrophy consistent with denervation.  At this point the flap appeared able to be easily closed tension-free.  The tourniquet was deflated, and meticulous hemostasis was achieved.   There was no evidence for any hemorrhage.  The retained skin margins all appeared viable.  The wound was thoroughly irrigated.  The gastrocnemius fascia was approximated to the periosteum on the anterior and medial tibia, as well as to the anterior compartment fascia laterally, with multiple figure-of-eight 0-Vicryl sutures.  The subcutaneous tissues were approximated with multiple 2-0 Vicryl sutures.  The skin was approximated with multiple 3-0 nylon sutures in an interrupted mattress pattern.  A tension-free closure was obtained with a viable and perfused appearing posterior flap.  The wound was cleansed off and dried, and then dressed sterilely with Adaptic, gauze bandages, ABDs, and sterile cast padding.  This was followed by an ace wrap.    All surgical counts were reported as correct at the end of the case.  The patient was extubated uneventfully and then taken to the recovery room in stable condition.  I was present and scrubbed in for the entire case from start to finish.        POSTOPERATIVE PLAN:     Admit:  To orthopaedic [facility]ra[facility], orthopaedic primary with medicine comanagement.  Diet:  Clear liquids and advance diet as tolerated.  Pain control:  Multimodal analgesia.  Wean from IV to oral pain medications.  Has indwelling continuous infusion femoral and sciatic nerve catheters.  PT/OT for gait training, transfers, ADLs, bilateral upper extremity and nonoperative lower extremity  strengthening.  Weightbearing status:  Nonweightbearing on the residual operative lower extremity with appropriate assistive gait devices and supervision.  Ice to the residual limb.  Immobilization:  Will obtain FloTech leg protector to protect residual limb and keep the knee extended.  Elevation:  Elevate operative lower extremity on pillows as much as possible and tolerated, with the exception of ADL's, therapy, medical care, etc.  Encourage keeping operative lower extremity with knee in full extension to help prevent knee flexion contracture.  Activity:  Patient should be up with assist until independent.  Imaging:  None.  Antibiotics:  Continue perioperative antibiotics 24 hours perioperatively.  Dressings:  Dressings are to be kept clean, dry, and intact.  Drain:  None.  Labs:  Hg POD#1.  DVT prophylaxis:  SCD's while inpatient on nonoperative lower extremity.   mg po daily until 6 weeks postop.  Disposition:  Pending pain control, progression with therapies, and medical stability.  Follow up:  2 weeks postop in orthopaedic clinic for a wound check and dressing change, and then again at approximately 1 month for possible suture removal.         Kurtis Nye MD  Attending surgeon  Orthopaedic Surgery

## 2024-05-13 ENCOUNTER — PATIENT OUTREACH (OUTPATIENT)
Dept: CARE COORDINATION | Facility: CLINIC | Age: 64
End: 2024-05-13
Payer: MEDICARE

## 2024-05-13 ENCOUNTER — MYC MEDICAL ADVICE (OUTPATIENT)
Dept: FAMILY MEDICINE | Facility: CLINIC | Age: 64
End: 2024-05-13
Payer: MEDICARE

## 2024-05-13 DIAGNOSIS — Z89.511 S/P BELOW KNEE AMPUTATION, RIGHT (H): Primary | ICD-10-CM

## 2024-05-13 NOTE — DISCHARGE SUMMARY
[facility]  Orthopedic Surgery Discharge Summary     Date of Admission: 5/9/2024  Date of Discharge: 5/10/2024  5:36 PM  Disposition: Home  Staff Physician: uKrtis Nye MD  Primary Care Provider: Wegener, Joel Daniel Irwin    ADMISSION DIAGNOSIS:  Achilles tendon infection [M65.10]    DISCHARGE DIAGNOSIS:  Achilles tendon infection [M65.10]    PROCEDURES: Procedure(s):  Right below knee amputation (transtibial amputation) on 5/9/2024    BRIEF HISTORY:  Erwin Aguilar is a 63 year old male now s/p Right BKA on 5/9/2024 with Dr. Nye.     HOSPITAL COURSE:    The patient was admitted following the above listed procedures for pain control and rehabilitation. Erwin Aguilar did well post-operatively. Medicine was consulted post operatively to aid in management of medical co-morbidities. The patient received routine nursing cares and at the time of discharge was medically stable. Vital signs were stable throughout admission. The patient is tolerating a regular diet and is voiding spontaneously. All PT/OT goals have been met for safe mobility. Pain is now controlled on oral medications which will be available on discharge. Stool softeners have been used while taking pain medications to help prevent constipation. Erwin Aguilar is deemed medically safe to discharge.     Antibiotics:  Ancef given periop and 24 hours postop.  DVT prophylaxis:  Mechanical prophylaxis initiated after surgery.   PT Progress:  Has met PT/OT goals for safe mobility.    Pain Meds:  Weaned off all IV pain meds by discharge.  Inpatient Events: No significant events or complications.     PHYSICAL EXAM:    Please see exam from orthopedic progress note on the day of discharge.     FOLLOWUP:    Future Appointments   Date Time Provider Department Center   5/24/2024 10:00 AM Nurse, Everette LEWIS [facility]   6/4/2024  8:20 AM Dawit Fox MD [facility]   6/21/2024 10:00 AM [facility]   8/5/2024   7:15 AM  [facility]   8/5/2024  8:15 AM Miguel A Thomas PA-C [facility]   1/17/2025 10:30 AM Wegener, Joel Daniel Irwin, MD [facility]     Orthopedic surgery appointments are at the [facility] [place] ([address] [place] [address]). Call [phone] to schedule a follow-up appointment at this location with your provider.     PLANNED DISCHARGE ORDERS:     DVT Prophylaxis: As below     Activity: See below.     Wound Care: See below.      Discharge Medication List as of 5/10/2024  4:51 PM        START taking these medications    Details   hydrOXYzine HCl (ATARAX) 25 MG tablet Take 1 tablet (25 mg) by mouth every 6 hours as needed for other (adjuvant pain), Disp-30 tablet, R-0, E-Prescribe      methocarbamol (ROBAXIN) 500 MG tablet Take 1 tablet (500 mg) by mouth 4 times daily, Disp-20 tablet, R-0, E-Prescribe      polyethylene glycol (MIRALAX) 17 GM/Dose powder Take 17 g by mouth daily, Disp-510 g, R-0, E-Prescribe      senna-docusate (SENOKOT-S/PERICOLACE) 8.6-50 MG tablet Take 1 tablet by mouth 2 times daily, Disp-60 tablet, R-0, E-Prescribe           CONTINUE these medications which have CHANGED    Details   aspirin 81 MG EC tablet Take 1 tablet (81 mg) by mouth daily, Disp-84 tablet, R-0, E-Prescribe           CONTINUE these medications which have NOT CHANGED    Details   albuterol (PROAIR HFA/PROVENTIL HFA/VENTOLIN HFA) 108 (90 Base) MCG/ACT inhaler INHALE 2 PUFFS BY MOUTH EVERY 6 HOURS AS NEEDED FOR SHORTNESS OF BREATH OR WHEEZING, Disp-18 g, R-1, E-PrescribePharmacy may dispense brand covered by insurance (Proair, or proventil or ventolin or generic albuterol inhaler)      apixaban ANTICOAGULANT (ELIQUIS ANTICOAGULANT) 5 MG tablet Take 1 tablet (5 mg) by mouth 2 times daily, Disp-180 tablet, R-3, E-Prescribe      Continuous Blood Gluc  (FREESTYLE CACHORRO 2 READER) GASTON USE TO READ BLOOD SUGARS AS PER 'S INSTRUCTIONS, Disp-1 each, R-0,  E-PrescribeFaxing supporting documentation today.      doxazosin (CARDURA) 8 MG tablet Take 1 tablet (8 mg) by mouth at bedtime, Disp-90 tablet, R-1, E-Prescribe      !! gabapentin (NEURONTIN) 300 MG capsule Take 600 mg by mouth every evening Take 600 mg by mouth in the afternoon and 300 mg at bedtime, Historical      !! gabapentin (NEURONTIN) 300 MG capsule Take 300 mg by mouth at bedtime Take 600 mg by mouth in the afternoon and 300 mg at bedtime, Historical      insulin glargine (LANTUS SOLOSTAR) 100 UNIT/ML pen INJECT 26 UNITS SUBCUTANEOUSLY AT BEDTIME, Disp-15 mL, R-3, E-PrescribeIf Lantus is not covered by insurance, may substitute Basaglar or Semglee or other insulin glargine product per insurance preference at same dose and frequency.        insulin pen needle (GLOBAL EASE INJECT PEN NEEDLES) 32G X 4 MM miscellaneous USE AS DIRECTED EVERY DAY, Disp-100 each, R-3, E-Prescribe      medical cannabis (Patient's own supply) See Admin Instructions (The purpose of this order is to document that the patient reports taking medical cannabis.  This is not a prescription, and is not used to certify that the patient has a qualifying medical condition.)   Flower - PRN, Historical      oxyCODONE IR (ROXICODONE) 10 MG tablet Take 1 tablet (10 mg) by mouth 3 times daily as needed for severe pain In addition to 5 mg four times daily dosing., Disp-90 tablet, R-0, E-Prescribe      promethazine (PHENERGAN) 25 MG tablet TAKE 1 TABLET BY MOUTH 3 TIMES A DAY AS NEEDED FOR NAUSEA, Disp-270 tablet, R-1, E-Prescribe      propranolol (INDERAL) 40 MG tablet Take 1 tablet (40 mg) by mouth 3 times daily, Disp-270 tablet, R-3, E-Prescribe      rosuvastatin (CRESTOR) 20 MG tablet Take 40 mg by mouth daily, Historical      sertraline (ZOLOFT) 100 MG tablet TAKE 2 TABLETS BY MOUTH DAILY, Disp-180 tablet, R-3, E-Prescribe      triamterene-HCTZ (DYAZIDE) 37.5-25 MG capsule Take 1 capsule by mouth every morning, Disp-90 capsule, R-3, E-Prescribe  "     ACE/ARB NOT PRESCRIBED, INTENTIONAL, ACE & ARB not prescribed due to Allergy, Historical      baclofen (LIORESAL) 10 MG tablet TAKE 2 TABLETS BY MOUTH 3 TIMES A DAY, Disp-360 tablet, R-3, E-Prescribe       !! - Potential duplicate medications found. Please discuss with provider.            Discharge Procedure Orders   Reason for your hospital stay   Order Comments: Right BKA     When to call - Contact Surgeon Team   Order Comments: You may experience symptoms that require follow-up before your scheduled appointment. Refer to the \"Stoplight Tool\" for instructions on when to contact your Surgeon Team if you are concerned about pain control, blood clots, constipation, or if you are unable to urinate.     When to call - Reach out to Urgent Care   Order Comments: If you are not able to reach your Surgeon Team and you need immediate care, go to the Orthopedic Walk-in Clinic or Urgent Care at your Surgeon's office.  Do NOT go to the Emergency Room unless you have shortness of breath, chest pain, or other signs of a medical emergency.     When to call - Reasons to Call 911   Order Comments: Call 911 immediately if you experience sudden-onset chest pain, arm weakness/numbness, slurred speech, or shortness of breath     Discharge Instruction - Breathing exercises   Order Comments: Perform breathing exercises using your Incentive Spirometer 10 times per hour while awake for 2 weeks.     Symptoms - Fever Management   Order Comments: A low grade fever can be expected after surgery.  Use acetaminophen (TYLENOL) as needed for fever management.  Contact your Surgeon Team if you have a fever greater than 101.5 F, chills, and/or night sweats.     Symptoms - Constipation management   Order Comments: Constipation (hard, dry bowel movements) is expected after surgery due to the combination of being less active, the anesthetic, and the opioid pain medication.  You can do the following to help reduce constipation:  ~  FLUIDS:  Drink " clear liquids (water or Gatorade), or juice (apple/prune).  ~  DIET:  Eat a fiber rich diet.    ~  ACTIVITY:  Get up and move around several times a day.  Increase your activity as you are able.  MEDICATIONS:  Reduce the risk of constipation by starting medications before you are constipated.  You can take Miralax   (1 packet as directed) and/or a stool softener (Senokot 1-2 tablets 1-2 times a day).  If you already have constipation and these medications are not working, you can get magnesium citrate and use as directed.  If you continue to have constipation you can try an over the counter suppository or enema.  Call your Surgeon Team if it has been greater than 3 days since your last bowel movement.     Symptoms - Reduced Urine Output   Order Comments: Changes in the amount of fluids you drank before and after surgery may result in problems urinating.  It is important to stay well-hydrated after surgery and drink plenty of water. If it has been greater than 8 hours since you have urinated despite drinking plenty of water, call your Surgeon Team.     Activity - Exercises to prevent blood clots   Order Comments: Unless otherwise directed by your Surgeon team, perform the following exercises at least three times per day for the first four weeks after surgery to prevent blood clots in your legs: 1) Point and flex your feet (Ankle Pumps), 2) Move your ankle around in big circles, 3) Wiggle your toes, 4) Walk, even for short distances, several times a day, will help decrease the risk of blood clots.     Order Specific Question Answer Comments   Is discharge order? Yes      Comfort and Pain Management - Pain after Surgery   Order Comments: Pain after surgery is normal and expected.  You will have some amount of pain for several weeks after surgery.  Your pain will improve with time.  There are several things you can do to help reduce your pain including: rest, ice, elevation, and using pain medications as needed. Contact  your Surgeon Team if you have pain that persists or worsens after surgery despite rest, ice, elevation, and taking your medication(s) as prescribed. Contact your Surgeon Team if you have new numbness, tingling, or weakness in your operative extremity.     Comfort and Pain Management - Swelling after Surgery   Order Comments: Swelling and/or bruising of the surgical extremity is common and may persist for several months after surgery. In addition to frequent icing and elevation, gentle compressive support with an ACE wrap or tubigrip may help with swelling. Apply compression regularly, removing at least twice daily to perform skin checks. Contact your Surgeon Team if your swelling increases and is NOT associated with an increase in your activity level, or if your swelling increases and is associated with redness and pain.     Comfort and Pain Management - Cold therapy   Order Comments: Ice can be used to control swelling and discomfort after surgery. Place a thin towel over your operative site and apply the ice pack overtop. Leave ice pack in place for 20 minutes, then remove for 20 minutes. Repeat this 20 minutes on/20 minutes off routine as often as tolerated.     Medication Instructions - Acetaminophen (TYLENOL) Instructions   Order Comments: You were discharged with acetaminophen (TYLENOL) for pain management after surgery. Acetaminophen most effectively manages pain symptoms when it is taken on a schedule without missing doses (every four, six, or eight hours). Your Provider will prescribe a safe daily dose between 3000 - 4000 mg.  Do NOT exceed this daily dose. Most patients use acetaminophen for pain control for the first four weeks after surgery.  You can wean from this medication as your pain decreases.     Medication Instructions - NSAID Instructions   Order Comments: You were discharged with an anti-inflammatory medication for pain management to use in combination with acetaminophen (TYLENOL) and the  "narcotic pain medication.  Take this medication exactly as directed.  You should only take one anti-inflammatory at a time.  Some common anti-inflammatories include: ibuprofen (ADVIL, MOTRIN), naproxen (ALEVE, NAPROSYN), celecoxib (CELEBREX), meloxicam (MOBIC), ketorolac (TORADOL).  Take this medication with food and water.     Medication Instructions - Opioids - Tapering Instructions   Order Comments: In the first three days following surgery, your symptoms may warrant use of the narcotic pain medication every four to six hours as prescribed. This is normal. As your pain symptoms improve, focus your efforts on decreasing (tapering) use of narcotic medications. The most successful tapering strategy is to first, decrease the number of tablets you take every 4-6 hours to the minimum prescribed. Then, increase the amount of time between doses.  For example:  First, taper to   or 1 tablet every 4-6 hours.  Then, taper to   or 1 tablet every 6-8 hours.  Then, taper to   or 1 tablet every 8-10 hours.  Then, taper to   or 1 tablet every 10-12 hours.  Then, taper to   or 1 tablet at bedtime.  The bedtime dose can help with comfort during sleep and is typically the last dose to be discontinued after surgery.     Follow Up Care   Order Comments: Follow-up with your Surgeon Team in 2 weeks for wound check.     Medication instructions -  Anticoagulation - aspirin   Order Comments: Take the aspirin as prescribed for a total of four weeks after surgery.  This is given to help minimize your risk of blood clot.     Comfort and Pain Management - LOWER Extremity Elevation   Order Comments: Swelling is expected for several months after surgery. This type of swelling is usually associated with gravity and activity, and can be improved with elevation.   The best way to do this is to get your \"toes above your nose\" by laying down and placing several pillows lengthwise under your calf and heel. When elevating your leg keep your knee " completely straight. Perform this elevation as often as possible especially for the first two weeks after surgery.     Medication Instructions - Opioid Instructions (1 - 2 tablets Q 4-6 hours, MAX 6 tablets)   Order Comments: You were discharged with an opioid medication (hydromorphone, oxycodone, hydrocodone, or tramadol). This medication should only be taken for breakthrough pain that is not controlled with acetaminophen (TYLENOL). If you rate your pain less than 3 you do not need this medication.  Pain rating 0-3:  You do not need this medication.  Pain rating 4-6:  Take 1 tablet every 4-6 hours as needed  Pain rating 7-10:  Take 2 tablets every 4-6 hours as needed.  Do not exceed 6 tablets per day     Return to Driving   Order Comments: Return to driving - Driving is NOT permitted until directed by your provider. Under no circumstance are you permitted to drive while using narcotic pain medications.     Order Specific Question Answer Comments   Is discharge order? Yes      Dressing / Wound Care - Wound   Order Comments: You have a clean dressing on your surgical wound. Dressing change instructions as follows: dressing will be removed at your follow-up appointment. Contact your Surgeon Team if you have increased redness, warmth around the surgical wound, and/or drainage from the surgical wound.     Dressing / Wound Care - NO Tub Bathing   Order Comments: Tub bathing, swimming, or any other activities that will cause your incision to be submerged in water should be avoided until allowed by your Surgeon.     NO weight bearing   Order Comments: No weight bearing on your right lower extremity.     Order Specific Question Answer Comments   Is discharge order? Yes      Dressing / Wound care - Shower with wound/dressing covered   Order Comments: You must COVER your dressing or incision with saran wrap (or any other non-permeable covering) to allow the incision to remain dry while showering.  You may shower 7 days after  surgery as long as the surgical wound stays dry. Continue to cover your dressing or incision for showering until your first office visit.  You are strictly prohibited from soaking or submerging the surgical wound underwater.     Walker DME   Order Comments: DME Documentation: Describe the reason for need to support medical necessity: Impaired gait due to Foot/Ankle surgery. Anticipated length of need: 3 months     Order Specific Question Answer Comments   Medical Equipment (DME) Supplier: [employer]    PATIENT INSTRUCTIONS: If you did not receive this ordered item today, please contact [employer] for availability ([place] Locations: [phone], [place]: [phone]).    Walker Type: Standard (2 Wheel)    Accessories: N/A      Discharge Instruction - Regular Diet Adult   Order Comments: Return to your pre-surgery diet unless instructed otherwise     Order Specific Question Answer Comments   Is discharge order? Yes        Kurtis Hernadez MD  Orthopaedic Surgery PGY-4

## 2024-05-13 NOTE — PROGRESS NOTES
[facility] Care Resource Center: [facility]    Background: Transitional Care Management program identified per system criteria and reviewed by [facility] team for possible outreach.    Assessment: Upon chart review, CCR Team member will not proceed with patient outreach related to this episode of Transitional Care Management program due to reason below:    Patient has active communication with a nurse, provider or care team for reason of post-hospital follow up plan.  Outreach call by CCRC team not indicated to minimize duplicative efforts.     See multiple [employer] messages from 5/10/24, 5/12/24 and today.     Plan: Transitional Care Management episode addressed appropriately per reason noted above.      Keyonna Gibson, RODERICK  Connected [facility] Resource [place], [facility]    *Connected Care Resource Team does NOT follow patient ongoing. Referrals are identified based on internal discharge reports and the outreach is to ensure patient has an understanding of their discharge instructions.

## 2024-05-14 ENCOUNTER — TELEPHONE (OUTPATIENT)
Dept: FAMILY MEDICINE | Facility: CLINIC | Age: 64
End: 2024-05-14
Payer: MEDICARE

## 2024-05-14 ENCOUNTER — PATIENT OUTREACH (OUTPATIENT)
Dept: CARE COORDINATION | Facility: CLINIC | Age: 64
End: 2024-05-14
Payer: MEDICARE

## 2024-05-14 LAB
ATRIAL RATE - MUSE: 51 BPM
DIASTOLIC BLOOD PRESSURE - MUSE: NORMAL MMHG
INTERPRETATION ECG - MUSE: NORMAL
P AXIS - MUSE: 10 DEGREES
PATH REPORT.COMMENTS IMP SPEC: NORMAL
PATH REPORT.COMMENTS IMP SPEC: NORMAL
PATH REPORT.GROSS SPEC: NORMAL
PATH REPORT.RELEVANT HX SPEC: NORMAL
PHOTO IMAGE: NORMAL
PR INTERVAL - MUSE: 150 MS
QRS DURATION - MUSE: 86 MS
QT - MUSE: 464 MS
QTC - MUSE: 427 MS
R AXIS - MUSE: -3 DEGREES
SYSTOLIC BLOOD PRESSURE - MUSE: NORMAL MMHG
T AXIS - MUSE: 12 DEGREES
VENTRICULAR RATE- MUSE: 51 BPM

## 2024-05-14 NOTE — PROGRESS NOTES
Clinic Care Coordination Contact  [facility]/Voicemail    Clinical Data: Care Coordinator Outreach    Outreach Documentation Number of Outreach Attempt   5/14/2024  11:07 AM 1       Spoke to pt who is waiting for a ride. Requests CHW call him today after lunch.    Plan:  Care Coordinator will attempt to call pt this afternoon, per pt request.    Michell Del Cid  Community Health Worker  [facility]  [phone]

## 2024-05-14 NOTE — TELEPHONE ENCOUNTER
Home Health Care    Reason for call:  Verbal order in delay in care for nurse 05/17/2024    Orders are needed for this patient.      Pt Provider: juni    Phone Number Homecare Nurse can be reached at: [facility] sup vm secue to leave msg [phone]      Could we send this information to you in [facility] or would you prefer to receive a phone call?:   No preference   Okay to leave a detailed message?:

## 2024-05-14 NOTE — PROGRESS NOTES
Clinic Care Coordination Contact  [facility]/Voicemail    Clinical Data: Care Coordinator Outreach    Outreach Documentation Number of Outreach Attempt   5/14/2024  11:07 AM 1   5/14/2024   2:43 PM 2       Left message on patient's voicemail with call back information and requested return call.    Plan:  Care Coordinator will try to reach patient again in 1-2 business days.    Michell Del Cid  Community Health Worker  [facility]  [phone]

## 2024-05-14 NOTE — TELEPHONE ENCOUNTER
JW,      Please see message below.  Need verbal ok from you to delay care until 5/17,    Isela Villar RN

## 2024-05-14 NOTE — LETTER
[facility] CARE COORDINATION  [address]  [facility] [address]    May 21, 2024    Erwin Aguilar  [address]   [place] [address]      Dear Erwin,    I am a clinic community health worker who works with Joel Daniel Wegener, MD with the [facility]. I wanted to thank you for spending the time to talk with me.  Below is a description of clinic care coordination and how I can further assist you.       The clinic care coordination team is made up of a registered nurse, , financial resource worker and community health worker who understand the health care system. The goal of clinic care coordination is to help you manage your health and improve access to the health care system. Our team works alongside your provider to assist you in determining your health and social needs. We can help you obtain health care and community resources, providing you with necessary information and education. We can work with you through any barriers and develop a care plan that helps coordinate and strengthen the communication between you and your care team.  Our services are voluntary and are offered without charge to you personally.    Please feel free to contact me with any questions or concerns regarding care coordination and what we can offer.      We are focused on providing you with the highest-quality healthcare experience possible.      Sincerely,     Michell Del Cid  Community Health Worker  [facility]  [phone]

## 2024-05-15 NOTE — PROGRESS NOTES
"Clinic Care Coordination Contact  Community Health Worker Initial Outreach    Patient accepts CC: No, pt would like CHW to call him next Tuesday, 5/21/24, once home health care has been started and some time has passed for surgery recovery. Patient will be sent Care Coordination introduction letter for future reference.       Spoke to pt this afternoon:  Medical diagnosis - hospitalized 5/9/24-5/10/24 with Sula's Tendon infection, R BKA 5/9/24  HHC for SN, through [facility], to begin 5/17/24.  PCA services - Pt would like to know his options. CHW did mention that due to patient's insurance plan, Medicare, all services would be private pay.  Caregiver for his SO, Jennifer - \"She requires a lot of care too.\"  CHW asks if pt would like to speak with CHRISTEN Cedeno, about PCA services. He declines, requesting CHW call him next Tuesday, 5/21/24, when he has had more time to heal following surgery and once HHC RN visits have started.  Housing - pt and Jennifer have recently moves to a new apartment.     CHW Plan: CHW will outreach on 5/21/24 to further discuss ways CC might be able to assist pt.    Michell Del Cid  Community Health Worker  [facility]  [phone]          "

## 2024-05-16 ENCOUNTER — MYC MEDICAL ADVICE (OUTPATIENT)
Dept: FAMILY MEDICINE | Facility: CLINIC | Age: 64
End: 2024-05-16
Payer: MEDICARE

## 2024-05-16 ENCOUNTER — VIRTUAL VISIT (OUTPATIENT)
Dept: FAMILY MEDICINE | Facility: CLINIC | Age: 64
End: 2024-05-16
Payer: MEDICARE

## 2024-05-16 DIAGNOSIS — Z98.890 H/O FOOT SURGERY: ICD-10-CM

## 2024-05-16 DIAGNOSIS — S86.011A RUPTURE OF RIGHT ACHILLES TENDON, INITIAL ENCOUNTER: ICD-10-CM

## 2024-05-16 DIAGNOSIS — G89.29 CHRONIC PAIN OF RIGHT ANKLE: ICD-10-CM

## 2024-05-16 DIAGNOSIS — M25.571 CHRONIC PAIN OF RIGHT ANKLE: ICD-10-CM

## 2024-05-16 DIAGNOSIS — I10 HYPERTENSION GOAL BP (BLOOD PRESSURE) < 140/90: ICD-10-CM

## 2024-05-16 DIAGNOSIS — I70.228 ATHEROSCLEROSIS OF NATIVE ARTERIES OF EXTREMITIES WITH REST PAIN, OTHER EXTREMITY (H): ICD-10-CM

## 2024-05-16 DIAGNOSIS — M79.671 INTRACTABLE RIGHT HEEL PAIN: ICD-10-CM

## 2024-05-16 DIAGNOSIS — M65.28 CALCIFIC ACHILLES TENDINITIS: ICD-10-CM

## 2024-05-16 DIAGNOSIS — G89.4 CHRONIC PAIN SYNDROME: ICD-10-CM

## 2024-05-16 DIAGNOSIS — M21.6X1 ACQUIRED CALCANEUS DEFORMITY OF RIGHT ANKLE: ICD-10-CM

## 2024-05-16 DIAGNOSIS — Z89.511 S/P BELOW KNEE AMPUTATION, RIGHT (H): ICD-10-CM

## 2024-05-16 PROCEDURE — 99495 TRANSJ CARE MGMT MOD F2F 14D: CPT | Mod: 95 | Performed by: FAMILY MEDICINE

## 2024-05-16 RX ORDER — DOXAZOSIN 8 MG/1
8 TABLET ORAL AT BEDTIME
Qty: 90 TABLET | Refills: 1 | Status: SHIPPED | OUTPATIENT
Start: 2024-05-16

## 2024-05-16 NOTE — PATIENT INSTRUCTIONS
Had amputation scheduled, canceled due to vascular concerns, vascular surgery then amputation.     Had surgery 3:00 Thursday quite uncomfortable so went home Friday evening.     Sunday/Monday feeling much better.     Reviewed picture of amputation he sent.     Does not have home health and spouse has been very ill so cannot help.  Does have first firt nurse visit tomorrow.  Does have wheelchair and is taking of self well.     Feeling very optimistic about future.     10 years alcohol free in March!     I will send in doxazocin refills. 8 mg    Continue oxycodone 10mg three times daily for now will let me know when/if needing less.     Follow up three months for chronic pain and anytime before then for annual wellness/blood pressue check since blood pressue high last checked in chart.

## 2024-05-17 ENCOUNTER — TELEPHONE (OUTPATIENT)
Dept: FAMILY MEDICINE | Facility: CLINIC | Age: 64
End: 2024-05-17
Payer: MEDICARE

## 2024-05-17 ENCOUNTER — MEDICAL CORRESPONDENCE (OUTPATIENT)
Dept: HEALTH INFORMATION MANAGEMENT | Facility: CLINIC | Age: 64
End: 2024-05-17

## 2024-05-17 NOTE — TELEPHONE ENCOUNTER
[facility] care calling for verbal orders,     Opened to home care today as had right below knee amp r/t achilles tendon infection and currently needing nursing 1x week for 6 weeks and then once every other week for 3 to assess healing of surgical incision, pain management, and diabetes and safety.    Callback: [phone] and ok to [facility] on confidential voicemail.    Thanks!  Jt Venegas RN   [place]

## 2024-05-19 ENCOUNTER — MYC MEDICAL ADVICE (OUTPATIENT)
Dept: FAMILY MEDICINE | Facility: CLINIC | Age: 64
End: 2024-05-19
Payer: MEDICARE

## 2024-05-19 DIAGNOSIS — M21.6X1 ACQUIRED CALCANEUS DEFORMITY OF RIGHT ANKLE: ICD-10-CM

## 2024-05-19 DIAGNOSIS — G89.4 CHRONIC PAIN SYNDROME: ICD-10-CM

## 2024-05-19 DIAGNOSIS — G89.29 CHRONIC PAIN OF RIGHT ANKLE: ICD-10-CM

## 2024-05-19 DIAGNOSIS — E11.42 TYPE 2 DIABETES MELLITUS WITH DIABETIC POLYNEUROPATHY, WITH LONG-TERM CURRENT USE OF INSULIN (H): ICD-10-CM

## 2024-05-19 DIAGNOSIS — M79.671 INTRACTABLE RIGHT HEEL PAIN: ICD-10-CM

## 2024-05-19 DIAGNOSIS — M25.571 CHRONIC PAIN OF RIGHT ANKLE: ICD-10-CM

## 2024-05-19 DIAGNOSIS — S86.011A RUPTURE OF RIGHT ACHILLES TENDON, INITIAL ENCOUNTER: ICD-10-CM

## 2024-05-19 DIAGNOSIS — Z79.4 TYPE 2 DIABETES MELLITUS WITHOUT COMPLICATION, WITH LONG-TERM CURRENT USE OF INSULIN (H): ICD-10-CM

## 2024-05-19 DIAGNOSIS — E11.9 TYPE 2 DIABETES MELLITUS WITHOUT COMPLICATION, WITH LONG-TERM CURRENT USE OF INSULIN (H): ICD-10-CM

## 2024-05-19 DIAGNOSIS — Z79.4 TYPE 2 DIABETES MELLITUS WITH DIABETIC POLYNEUROPATHY, WITH LONG-TERM CURRENT USE OF INSULIN (H): ICD-10-CM

## 2024-05-19 DIAGNOSIS — Z98.890 H/O FOOT SURGERY: ICD-10-CM

## 2024-05-19 DIAGNOSIS — M65.28 CALCIFIC ACHILLES TENDINITIS: ICD-10-CM

## 2024-05-21 NOTE — PROGRESS NOTES
Clinic Care Coordination Contact  Community Health Worker Initial Outreach    HX: See 5/1424 CHW outreach. Pt asks that CHW call him today to discuss if he has any CC needs.      Patient accepts CC: No, Enrolled in home care. Patient will be sent Care Coordination introduction letter for future reference.      Spoke to pt this morning:  Home Health Care - 5/17/24 was first SN visit. Pt will be seeing weekly. Pt states he is working with [facility] to get home health care for himself and his spouse..   Ortho visit 5/24/24 - having bandage replaced on BKA. Pt is pleased that he has been able to decrease pain medication since his surgery.     Michell Del Cid  Community Health Worker  [facility]  [phone]

## 2024-05-22 RX ORDER — OXYCODONE HYDROCHLORIDE 10 MG/1
10 TABLET ORAL 3 TIMES DAILY PRN
Qty: 90 TABLET | Refills: 0 | Status: CANCELLED | OUTPATIENT
Start: 2024-05-22

## 2024-05-22 RX ORDER — OXYCODONE HYDROCHLORIDE 5 MG/1
5 TABLET ORAL
Qty: 30 TABLET | Refills: 0 | Status: SHIPPED | OUTPATIENT
Start: 2024-05-22

## 2024-05-22 RX ORDER — PEN NEEDLE, DIABETIC 32GX 5/32"
NEEDLE, DISPOSABLE MISCELLANEOUS
Qty: 100 EACH | Refills: 1 | Status: SHIPPED | OUTPATIENT
Start: 2024-05-22

## 2024-05-22 RX ORDER — INSULIN GLARGINE 100 [IU]/ML
INJECTION, SOLUTION SUBCUTANEOUS
Qty: 15 ML | Refills: 1 | Status: SHIPPED | OUTPATIENT
Start: 2024-05-22

## 2024-05-22 NOTE — TELEPHONE ENCOUNTER
JW,      Please see [employer] message.  Routed to you on 5/20.    Refill for Lantus and needles have been sent to pharmacy  Thank you,  Isela Villar RN

## 2024-05-24 ENCOUNTER — MYC MEDICAL ADVICE (OUTPATIENT)
Dept: ORTHOPEDICS | Facility: CLINIC | Age: 64
End: 2024-05-24

## 2024-05-24 ENCOUNTER — OFFICE VISIT (OUTPATIENT)
Dept: ORTHOPEDICS | Facility: CLINIC | Age: 64
End: 2024-05-24
Payer: MEDICARE

## 2024-05-24 DIAGNOSIS — Z89.519 S/P BKA (BELOW KNEE AMPUTATION) (H): ICD-10-CM

## 2024-05-24 DIAGNOSIS — Z89.511 S/P BELOW KNEE AMPUTATION, RIGHT (H): Primary | ICD-10-CM

## 2024-05-24 DIAGNOSIS — S91.309A NONHEALING WOUND OF HEEL: Primary | ICD-10-CM

## 2024-05-24 PROCEDURE — 99207 PR NO CHARGE NURSE ONLY: CPT

## 2024-05-24 RX ORDER — LIDOCAINE 4 G/G
1 PATCH TOPICAL EVERY 24 HOURS
Qty: 10 PATCH | Refills: 0 | Status: SHIPPED | OUTPATIENT
Start: 2024-05-24

## 2024-05-24 RX ORDER — LIDOCAINE 40 MG/G
CREAM TOPICAL
Qty: 28 G | Refills: 0 | Status: SHIPPED | OUTPATIENT
Start: 2024-05-24

## 2024-05-24 NOTE — PROGRESS NOTES
"Reason for visit:    Erwin Aguilar came in to the clinic for a two week post op check.    His surgery was done 5/9/2024 by Dr Nye.  He had a right transtibial BKA amputation.      Assessment:    Erwin came into the clinic in a wheelchair Non-WB.     The Surgical wounds were exposed and found to be well-healed; but only 2 sutures were removed. Erwin then refused any future suture removal due to pain. The plan was too remove every other suture and take the remaining next week. Dr Nye was available to speak with the patient to see what we could do for him in order to proceed with suture removal. He offered Ethyl Chloride spray (patient refused), lidocaine cream, lidocaine patches, lidocaine injections or at very last option, sedation in the OR for suture removal.   Patient elected to take prescriptions for lidocaine cream and patches and come back next week for another attempt.    I proceeded to make an appointment for next week and I also let the patient know that lidocaine patches are not often covered by insurance, so he may have to buy them over the counter. He said he does not have the money for that so if insurance does not cover the lidocaine patches then he will not come for the suture removal appointment next week because he cannot afford to buy them over the counter. I explained that we can only wait so many weeks after surgery to remove the sutures or they can become imbedded in the skin and then sedation will be necessary to remove them. He told me that he \"did not need to be scolded\" and I let him know that I simply wanted him to be aware of what can happen if we don't get them out in a timely manner so we have to be conscious of how many weeks go by after surgery. And that we are going to do whatever we can to help make him comfortable during the process so that we can avoid needing sedation. It should be fine to wait up to another 2 weeks.     Plan:   His stump was wrapped with fresh cast padding and " his ACE wrap that he came with.  He was told to remain Non-WB on that stump.   At his request, he was given a pair of crutches and an order for a wheelchair.     He has an appointment to see us next week for suture removal.    All questions were answered. He has our phone number and will call with additional questions or problems.    Erna Nye is the overseeing provider on site today.      DME FITTING    Relevant Diagnosis: Right BKA  Adult crutches were fit appropriately for the patient.     Person(s) involved in teaching:   Patient    Brace was applied in standard Manner:  Yes  Brace fit well:  Yes  Patient reports brace to fit comfortably:  Yes    Education:   Patient shown self application and removal of brace: Yes  Patient shown how to adjust brace fit, if necessary: Yes  Patient educated on billing and return policy: Yes  Patient confirmed understanding when and how to contact clinic with concerns: Yes

## 2024-05-28 ENCOUNTER — MYC MEDICAL ADVICE (OUTPATIENT)
Dept: FAMILY MEDICINE | Facility: CLINIC | Age: 64
End: 2024-05-28
Payer: MEDICARE

## 2024-05-28 ENCOUNTER — MEDICAL CORRESPONDENCE (OUTPATIENT)
Dept: HEALTH INFORMATION MANAGEMENT | Facility: CLINIC | Age: 64
End: 2024-05-28
Payer: MEDICARE

## 2024-05-28 ENCOUNTER — TELEPHONE (OUTPATIENT)
Dept: FAMILY MEDICINE | Facility: CLINIC | Age: 64
End: 2024-05-28
Payer: MEDICARE

## 2024-05-28 ENCOUNTER — PATIENT OUTREACH (OUTPATIENT)
Dept: CARE COORDINATION | Facility: CLINIC | Age: 64
End: 2024-05-28
Payer: MEDICARE

## 2024-05-28 DIAGNOSIS — Z53.9 DIAGNOSIS NOT YET DEFINED: Primary | ICD-10-CM

## 2024-05-28 DIAGNOSIS — Z89.511 S/P BELOW KNEE AMPUTATION, RIGHT (H): Primary | ICD-10-CM

## 2024-05-28 PROCEDURE — G0180 MD CERTIFICATION HHA PATIENT: HCPCS | Performed by: FAMILY MEDICINE

## 2024-05-28 NOTE — PROGRESS NOTES
Clinic Care Coordination Contact    Situation: Patient chart reviewed by care coordinator.    Background: pt referred to care coordination due to DME needs and caregiving concerns.     Assessment: SWCC spoke with [facility] and confirmed that he is enrolled with them and he will  be seen today by RN Case Manager and by Home Care SW later this week. They will be addressing the above concerns. Pt also declined clinic care coordination earlier this month due to a duplication with home care services.     Plan/Recommendations: Will not enroll pt in clinic care coordination at this time due to duplication of services.     Pao Coughlin, [facility] Ambulatory Care Management  [facility]  Phone: [phone]  E-mail: [email or web address]

## 2024-05-28 NOTE — TELEPHONE ENCOUNTER
"JW,  Please see below MyChart message and advise.  Patient also calling - recent amputation  Needing new wheelchair - has order in place but unable to  - unsure if medical supply delivers?  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

## 2024-05-28 NOTE — TELEPHONE ENCOUNTER
Care coordination please assist if able.    Priority need is wheelchair in my opinion and if able to get any pca care or home nursing for wound monitoring/assessment that would be ideal.     Joel Wegener,MD

## 2024-05-28 NOTE — TELEPHONE ENCOUNTER
"  Forms/Letter Request    Type of form/letter:   Physician order number: [id number]  Home Health Certification and plan of care  Cert period: 5/17/2024 to 7/15/2024    Order number: [id number]  \"Would like help getting a PCA for homemaking, MSW to eval and treat\"    Do we have the form/letter: Yes:     Who is the form from?   [facility]    Where did/will the form come from? form was faxed in    When is form/letter needed by: n/a    How would you like the form/letter returned: Fax : [phone]    Patient Notified form requests are processed in 5-7 business days:No    Could we send this information to you in [employer] or would you prefer to receive a phone call?:   n/a    Placed on Dr. Wegener's desk.    "

## 2024-05-28 NOTE — TELEPHONE ENCOUNTER
Walding, Rachel, LICSW Wegener, Joel Daniel Irwin, MD; Autumn Herring RN; Michell Del Cid, [facility]13 minutes ago (3:26 PM)     RW  Hi Dr Wegener,    I checked in with [facility] and he is enrolled with them, scheduled to see his Home Care nurse  today and a Home Care  later this week. I let them know what he is reporting and they plan to message the  asking her to reach out to Erwin today to address if she has not already addressed this. He did decline care coordination this month already, but I think that makes sense as it is a duplication with the level of home care involvement he currently has.    YAMEL Mckeon  [facility] Ambulatory Care Management  [facility]  Phone: [phone]  E-mail: [email or web address]

## 2024-05-29 NOTE — TELEPHONE ENCOUNTER
JW,    Please see message below.  DME order has been T'd up.    Thank you,  RODERICK Ayala Rachel, LICSW Brolsma, Elizabeth, RN  Phone Number: [phone]     I just talked to Erwin- he says that he does not want to talk with me unless I am delivering a wheelchair and disconnected the call. I also talked to [facility] supervisor Elizabeth Dow [phone]. [facility] told him that he needs a prescription for a wheelchair and then a DME company can get one delivered but it can take time, sometimes 2-4 weeks. Erwin was using an old wheelchair they had at home and it broke. He was never given a wheelchair for his own use and he says he is not able to pay upfront for the cost of a chair. Can Dr Wegener complete the necessary paperwork for the chair and then we can fax it to a DME company?

## 2024-05-30 ENCOUNTER — PATIENT OUTREACH (OUTPATIENT)
Dept: CARE COORDINATION | Facility: CLINIC | Age: 64
End: 2024-05-30
Payer: MEDICARE

## 2024-05-30 ENCOUNTER — TELEPHONE (OUTPATIENT)
Dept: FAMILY MEDICINE | Facility: CLINIC | Age: 64
End: 2024-05-30
Payer: MEDICARE

## 2024-05-30 ENCOUNTER — MYC REFILL (OUTPATIENT)
Dept: FAMILY MEDICINE | Facility: CLINIC | Age: 64
End: 2024-05-30
Payer: MEDICARE

## 2024-05-30 DIAGNOSIS — R11.0 NAUSEA WITHOUT VOMITING: ICD-10-CM

## 2024-05-30 DIAGNOSIS — K31.84 GASTROPARESIS: ICD-10-CM

## 2024-05-30 DIAGNOSIS — I10 HYPERTENSION GOAL BP (BLOOD PRESSURE) < 140/90: ICD-10-CM

## 2024-05-30 NOTE — TELEPHONE ENCOUNTER
"Triage,   Patient called.   Requesting PCA for home care and wheelchair order.   Because he doesn't have a wheelchair, patient has been crawling on the floor to get to the bathroom and is unable to leave his house to  his prescriptions.   On the phone, he expressed repeatedly \"no one is there to help him\".  He doesn't want to buy a wheelchair from [employer] because he will be unable to carry the delivery box up to his apartment and assemble the wheelchair.    Additionally, patient expressed his frustration with being left behind by his care team.   He expressed repeatedly during the call \"I give up\".   Per patient, he is in a horrible place and needs help today\"  He wanted JW to know he's in a horrible place and he needs help.     Livia CROCKER  "

## 2024-05-30 NOTE — PROGRESS NOTES
Social Work - Follow-Up  [facility]    Data/Intervention:    Patient Name: Erwin Aguilar Goes By: Erwin    /Age: 1960 (63 year old)    Reason for Follow-Up:  Spoke with Patient regarding referral placed by Ortho, specifically regarding a wheelchair.     Collaborated With:    -Patient, Erwin  -Melissa Zavaleta, RN  -Elvis Foster, [facility]    Intervention/Education/Resources Provided:  Referral placed for Social Work due to lack of support in the home post amputation surgery. Patient is without a wheelchair and struggling significantly. Checked wheelchair order and noticed item was not fulfilled by [facility] and no one was notified of this order denial. Contacted Melissa Zavaleta and Elvis Foster to collaborate and place new order with [facility]. Confirmed on 2024.     Contacted Elizabeth Jimenez, [phone], supervisor of [facility] who confirmed that contact has been attempted with Patient from RN and SW. Patient expressed not wanting home care until he can have a wheelchair. Provided Elizabeth with update on the wheelchair and contact information if anything is needed.     Patient contacted regarding wheelchair progress. Patient recently spoke with LIZZETTE Culp and Joel Wegener, MD who both reassured that actions are being taken to make sure Patient has the support he needs and will coordinate with care team to get sutures out ASAP. Patient expressed gratitude with this new development.    Patient denied any trouble with medication or finances. Patient is over income for MA and Medicare savings programs or Extra Help. Patient has his [employer] renewal paperwork signed and ready to be sent once he gets a stamp. Patient shared that his partner has a much larger income than he does and they recently moved to a less expensive apartment with many amenities. His main concern was mobility in the home which the wheelchair will help him with.      Assessment/Plan:  Patient  will wait for [facility] to provide a wheelchair and send in his [facility] renewal paperwork this week.      will remain available as needed for support or resources.    Previously provided patient/family with writer's contact information and availability.      Mahogany Diaz KALEIGH  Medical Social Worker    [facility] [place] [id number] [address]  [email or web address]  Gender pronouns: she/her   Employed by [employer]

## 2024-05-30 NOTE — TELEPHONE ENCOUNTER
Spoke w/ [facility].   Patient's medicare part B insurance will cover the wheelchair.   [facility] will have someone from their office to assess the patient's home and appropriately fit him for a wheelchair.   They will fax us documents for JW to fill out w/ F2F notes to be sent to [facility].   Once they have obtained the forms and completed the initial home assessment, they will mail a wheelchair to the patient's home and set up the chair.     Spoke w/ Erwin.   Relayed message from [facility] to patient.   Patient states he cannot wait that long so will order a wheelchair in the interim until [facility] can complete the initial assessment and wheelchair delivery.   Will keep patient updated with progress.  In the interim, patient has been using his wife's wheelchair which is too heavy for him to wheel up/down the apartment ramp.   He also doesn't want to leave his wife without a wheelchair if he leaves the house for appointments, grocery shopping, etc.    For the groceries, he politely declined our offer for groceries.   He has an apartment neighbor who will grocery shop for him.     Regarding the sutures, patient doesn't want to speak with Dr. Nye's care team as they have not been helpful for him.   He would like to be notified of a plan moving forward for suture removal.   Patient would prefer our office to reach out to Dr. Nye's office to discuss suture removal.   He would like a wound care referral.  Patient has been having difficulty with bandage changes - doesn't have the necessary supplies.  Will huddle w/ JW to discuss next steps of care.     Erwin has expressed he is going through a lot.   He hasn't had time to heal from his most recent surgery because he has been focused on taking care of his wife.     Livia CROCKER

## 2024-05-30 NOTE — TELEPHONE ENCOUNTER
Venkat YANEZ, visit RN from [facility] calling   was unable to get in contact with patient last week  Calling to update PCP that last evening patient declined all home care services   States if you can't get me a wheelchair I don't want to talk to you and hung up  Patient stated he stopped taking his diuretic due to difficulty ambulated to bathroom  Had recent falls as well   Updated Venkat regarding wheelchair in process as of this AM    [facility] case conference from 3-4pm today   Can call Venkat back if able to join case conference anytime between 3-4  Call back: [phone] okay to leave detailed message    Thanks,  Romelia KLEIN RN

## 2024-05-30 NOTE — PROGRESS NOTES
Social Work - Telephone/MyChart message  [facility]  Data:   Patient Name: Erwin Aguilar  Goes By: Erwin MAYERS/Age: 1960 (63 year old)      Referral Source: Orthopedics     Reason for Referral: Social Work Support and Resources, financial, Home Care, Transportation, etc.    Intervention: Left voice message for patient on 2024 .   Plan:  will await patient's return phone call/message and provide assistance at that time.   Mahogany Diaz [facility]  Medical Social Worker    [facility]     [address] [place] [id number]  [email or web address]  Gender pronouns: she/her   Employed by [employer]

## 2024-05-30 NOTE — TELEPHONE ENCOUNTER
Patient has multiple message threads going simultaneously.  Clinic staff is in communication with this patient.    Elvis Foster MS, ATC, LAT, CRESCENCIO  [facility]  Dr. Gaudencio Nye

## 2024-05-31 ENCOUNTER — MYC MEDICAL ADVICE (OUTPATIENT)
Dept: FAMILY MEDICINE | Facility: CLINIC | Age: 64
End: 2024-05-31

## 2024-05-31 RX ORDER — PROPRANOLOL HYDROCHLORIDE 40 MG/1
40 TABLET ORAL 3 TIMES DAILY
Qty: 270 TABLET | Refills: 2 | Status: SHIPPED | OUTPATIENT
Start: 2024-05-31

## 2024-05-31 RX ORDER — PROMETHAZINE HYDROCHLORIDE 25 MG/1
TABLET ORAL
Qty: 270 TABLET | Refills: 0 | Status: SHIPPED | OUTPATIENT
Start: 2024-05-31 | End: 2024-09-03

## 2024-06-03 ENCOUNTER — MYC REFILL (OUTPATIENT)
Dept: FAMILY MEDICINE | Facility: CLINIC | Age: 64
End: 2024-06-03
Payer: MEDICARE

## 2024-06-03 ENCOUNTER — MYC MEDICAL ADVICE (OUTPATIENT)
Dept: FAMILY MEDICINE | Facility: CLINIC | Age: 64
End: 2024-06-03
Payer: MEDICARE

## 2024-06-03 DIAGNOSIS — G89.4 CHRONIC PAIN SYNDROME: ICD-10-CM

## 2024-06-03 DIAGNOSIS — M65.28 CALCIFIC ACHILLES TENDINITIS: ICD-10-CM

## 2024-06-03 DIAGNOSIS — G89.29 CHRONIC PAIN OF RIGHT ANKLE: ICD-10-CM

## 2024-06-03 DIAGNOSIS — Z98.890 H/O FOOT SURGERY: ICD-10-CM

## 2024-06-03 DIAGNOSIS — S86.011A RUPTURE OF RIGHT ACHILLES TENDON, INITIAL ENCOUNTER: ICD-10-CM

## 2024-06-03 DIAGNOSIS — M25.571 CHRONIC PAIN OF RIGHT ANKLE: ICD-10-CM

## 2024-06-03 DIAGNOSIS — M79.671 INTRACTABLE RIGHT HEEL PAIN: ICD-10-CM

## 2024-06-03 DIAGNOSIS — M21.6X1 ACQUIRED CALCANEUS DEFORMITY OF RIGHT ANKLE: ICD-10-CM

## 2024-06-04 ENCOUNTER — OFFICE VISIT (OUTPATIENT)
Dept: FAMILY MEDICINE | Facility: CLINIC | Age: 64
End: 2024-06-04
Payer: MEDICARE

## 2024-06-04 ENCOUNTER — TELEPHONE (OUTPATIENT)
Dept: FAMILY MEDICINE | Facility: CLINIC | Age: 64
End: 2024-06-04

## 2024-06-04 ENCOUNTER — MEDICAL CORRESPONDENCE (OUTPATIENT)
Dept: HEALTH INFORMATION MANAGEMENT | Facility: CLINIC | Age: 64
End: 2024-06-04

## 2024-06-04 ENCOUNTER — OFFICE VISIT (OUTPATIENT)
Dept: CARDIOLOGY | Facility: CLINIC | Age: 64
End: 2024-06-04
Payer: MEDICARE

## 2024-06-04 VITALS
DIASTOLIC BLOOD PRESSURE: 68 MMHG | RESPIRATION RATE: 16 BRPM | SYSTOLIC BLOOD PRESSURE: 126 MMHG | HEART RATE: 58 BPM | TEMPERATURE: 97.1 F | OXYGEN SATURATION: 99 %

## 2024-06-04 VITALS
SYSTOLIC BLOOD PRESSURE: 125 MMHG | BODY MASS INDEX: 26.54 KG/M2 | RESPIRATION RATE: 14 BRPM | HEART RATE: 50 BPM | WEIGHT: 185 LBS | DIASTOLIC BLOOD PRESSURE: 65 MMHG

## 2024-06-04 DIAGNOSIS — Z89.511 S/P BELOW KNEE AMPUTATION, RIGHT (H): ICD-10-CM

## 2024-06-04 DIAGNOSIS — Z48.02 VISIT FOR SUTURE REMOVAL: Primary | ICD-10-CM

## 2024-06-04 DIAGNOSIS — E66.01 MORBID OBESITY (H): ICD-10-CM

## 2024-06-04 DIAGNOSIS — I25.119 CORONARY ARTERY DISEASE INVOLVING NATIVE CORONARY ARTERY OF NATIVE HEART WITH ANGINA PECTORIS (H): Primary | ICD-10-CM

## 2024-06-04 PROCEDURE — 99214 OFFICE O/P EST MOD 30 MIN: CPT | Performed by: INTERNAL MEDICINE

## 2024-06-04 PROCEDURE — 15853 REMOVAL SUTR/STAPL XREQ ANES: CPT | Mod: 22 | Performed by: FAMILY MEDICINE

## 2024-06-04 PROCEDURE — 99214 OFFICE O/P EST MOD 30 MIN: CPT | Performed by: FAMILY MEDICINE

## 2024-06-04 PROCEDURE — G2211 COMPLEX E/M VISIT ADD ON: HCPCS | Performed by: INTERNAL MEDICINE

## 2024-06-04 PROCEDURE — G2211 COMPLEX E/M VISIT ADD ON: HCPCS | Performed by: FAMILY MEDICINE

## 2024-06-04 RX ORDER — OXYCODONE HYDROCHLORIDE 10 MG/1
10 TABLET ORAL 3 TIMES DAILY PRN
Qty: 90 TABLET | Refills: 0 | Status: SHIPPED | OUTPATIENT
Start: 2024-06-04 | End: 2024-06-30

## 2024-06-04 ASSESSMENT — PAIN SCALES - GENERAL: PAINLEVEL: NO PAIN (0)

## 2024-06-04 NOTE — PROGRESS NOTES
Cardiology Progress Note     Assessment:    Peripheral arterial disease status post bilateral lower extremity angioplasty and right BKA  Hypercholesterolemia on rosuvastatin  Hypertension, improved control  Paroxysmal atrial fibrillation, on Eliquis, no new symptomatic episodes  Alcoholic liver disease with history of portal hypertension and esophageal bleeding, well controlled  Diabetes mellitus  Nocturnal bradycardia, asymptomatic    Plan:  Pharmacological stress test to rule out significant myocardial ischemia.  Continue current cardiac medications    Follow-up in 1 year    Subjective:   This is 63 year old male who comes in today for follow-up visit.  In May of this year he underwent right BKA.  It was done because of an healing ulceration of the right foot.  Prior to BKA he had peripheral angiogram and angioplasty.  There was no cardiac complications during the surgery.  In the last year or so he was able to ride bicycle without any chest pains.  He has been able to lose about 35 pounds with improved diet.  Blood pressure has become better controlled.    Review of Systems:   Negative other than history of present illness    Objective:   /65 (BP Location: Right arm, Patient Position: Sitting, Cuff Size: Adult Regular)   Pulse 50   Resp 14   Wt 83.9 kg (185 lb)   BMI 26.54 kg/m    Physical Exam:  GENERAL: no distress  NECK: No JVD  LUNGS: Clear to auscultation.  CARDIAC: regular rhythm, S1 & S2 normal.  No heaves, thrills, gallops or murmurs.  ABDOMEN: flat, negative hepatosplenomegaly, soft and non-tender.  EXTREMITIES: No evidence of cyanosis, clubbing or edema.  Right BKA    Current Outpatient Medications   Medication Sig Dispense Refill    albuterol (PROAIR HFA/PROVENTIL HFA/VENTOLIN HFA) 108 (90 Base) MCG/ACT inhaler INHALE 2 PUFFS BY MOUTH EVERY 6 HOURS AS NEEDED FOR SHORTNESS OF BREATH OR WHEEZING 18 g 1    apixaban ANTICOAGULANT (ELIQUIS ANTICOAGULANT) 5 MG tablet Take 1 tablet (5 mg) by  mouth 2 times daily 180 tablet 3    aspirin 81 MG EC tablet Take 1 tablet (81 mg) by mouth daily 84 tablet 0    baclofen (LIORESAL) 10 MG tablet TAKE 2 TABLETS BY MOUTH 3 TIMES A  tablet 3    doxazosin (CARDURA) 8 MG tablet Take 1 tablet (8 mg) by mouth at bedtime 90 tablet 1    gabapentin (NEURONTIN) 300 MG capsule Take 300 mg by mouth at bedtime Take 600 mg by mouth in the afternoon and 300 mg at bedtime      insulin glargine (LANTUS SOLOSTAR) 100 UNIT/ML pen INJECT 26 UNITS SUBCUTANEOUSLY AT BEDTIME 15 mL 1    insulin pen needle (GLOBAL EASE INJECT PEN NEEDLES) 32G X 4 MM miscellaneous USE AS DIRECTED EVERY  each 1    medical cannabis (Patient's own supply) See Admin Instructions (The purpose of this order is to document that the patient reports taking medical cannabis.  This is not a prescription, and is not used to certify that the patient has a qualifying medical condition.)   Flower - PRN      oxyCODONE IR (ROXICODONE) 10 MG tablet Take 1 tablet (10 mg) by mouth 3 times daily as needed for severe pain In addition to 5 mg four times daily dosing. 90 tablet 0    promethazine (PHENERGAN) 25 MG tablet TAKE 1 TABLET BY MOUTH 3 TIMES A DAY AS NEEDED FOR NAUSEA 270 tablet 0    propranolol (INDERAL) 40 MG tablet Take 1 tablet (40 mg) by mouth 3 times daily 270 tablet 2    rosuvastatin (CRESTOR) 20 MG tablet Take 40 mg by mouth daily      sertraline (ZOLOFT) 100 MG tablet TAKE 2 TABLETS BY MOUTH DAILY (Patient taking differently: TAKE 2 TABLETS BY MOUTH DAILY) 180 tablet 3    triamterene-HCTZ (DYAZIDE) 37.5-25 MG capsule Take 1 capsule by mouth every morning 90 capsule 3    ACE/ARB NOT PRESCRIBED, INTENTIONAL, ACE & ARB not prescribed due to Allergy      Continuous Blood Gluc  (FREESTYLE CACHORRO 2 READER) GASTON USE TO READ BLOOD SUGARS AS PER 'S INSTRUCTIONS (Patient not taking: Reported on 6/4/2024) 1 each 0    hydrOXYzine HCl (ATARAX) 25 MG tablet Take 1 tablet (25 mg) by mouth every 6  "hours as needed for other (adjuvant pain) (Patient not taking: Reported on 2024) 30 tablet 0    Lidocaine (LIDOCARE) 4 % Patch Place 1 patch onto the skin every 24 hours To prevent lidocaine toxicity, patient should be patch free for 12 hrs daily. (Patient not taking: Reported on 2024) 10 patch 0    lidocaine (LMX4) 4 % external cream Apply topically once as needed for mild pain (Patient not taking: Reported on 2024) 28 g 0    oxyCODONE (ROXICODONE) 5 MG tablet Take 1 tablet (5 mg) by mouth 5 times daily As needed for post op pain in addition to 10mg three times daily scheduled. (Patient not taking: Reported on 2024) 30 tablet 0       Cardiographics:    EC2024 normal sinus rhythm no ischemic changes    Echocardiogram: 2021  Global and regional left ventricular function is normal with an EF of 55-60%.  Right ventricular function, chamber size, wall motion, and thickness are  normal.  No hemodynamcially signifcant valvular abnormalities.  Estimated mean RA pressure is mildly elevated at 8 mmHg.  No pericardial effusion is present     Lab Results    Chemistry/lipid CBC Cardiac Enzymes/BNP/TSH/INR   Recent Labs   Lab Test 24  1155   CHOL 143   HDL 60   LDL 70   TRIG 65     Recent Labs   Lab Test 24  1155 24  0932 10/21/22  1006   LDL 70 113* 105*     Recent Labs   Lab Test 05/10/24  0744      POTASSIUM 3.5   CHLORIDE 103   CO2 27   GLC 92   BUN 8.1   CR 0.71  0.69   GFRESTIMATED >90  >90   [place] 8.8     Recent Labs   Lab Test 05/10/24  0744 24  1939 24  1155   CR 0.71  0.69 0.68 0.67     Recent Labs   Lab Test 24  1105 24  0932 08/10/23  0710   A1C 5.9* 5.9* 6.7*          Recent Labs   Lab Test 05/10/24  0744   WBC 7.7   HGB 12.9*   HCT 36.6*   MCV 88        Recent Labs   Lab Test 05/10/24  0744 24  1155 24  1208   HGB 12.9* 13.7 15.7    No results for input(s): \"TROPONINI\" in the last 92048 hours.  No results for " "input(s): \"BNP\", \"NTBNPI\", \"NTBNP\" in the last 15479 hours.  Recent Labs   Lab Test 10/09/18  0923   TSH 1.36     Recent Labs   Lab Test 01/05/24  1208 08/10/23  0710 07/16/22  1342   INR 1.23* 1.11 1.08                     "

## 2024-06-04 NOTE — LETTER
6/4/2024    Joel Daniel Wegener, MD  [address]  [facility] [address]    RE: Erwin Aguilar       Dear Colleague,     I had the pleasure of seeing Erwin Aguilar in the [facility].      Cardiology Progress Note     Assessment:    Peripheral arterial disease status post bilateral lower extremity angioplasty and right BKA  Hypercholesterolemia on rosuvastatin  Hypertension, improved control  Paroxysmal atrial fibrillation, on Eliquis, no new symptomatic episodes  Alcoholic liver disease with history of portal hypertension and esophageal bleeding, well controlled  Diabetes mellitus  Nocturnal bradycardia, asymptomatic    Plan:  Pharmacological stress test to rule out significant myocardial ischemia.  Continue current cardiac medications    Follow-up in 1 year    Subjective:   This is 63 year old male who comes in today for follow-up visit.  In May of this year he underwent right BKA.  It was done because of an healing ulceration of the right foot.  Prior to BKA he had peripheral angiogram and angioplasty.  There was no cardiac complications during the surgery.  In the last year or so he was able to ride bicycle without any chest pains.  He has been able to lose about 35 pounds with improved diet.  Blood pressure has become better controlled.    Review of Systems:   Negative other than history of present illness    Objective:   /65 (BP Location: Right arm, Patient Position: Sitting, Cuff Size: Adult Regular)   Pulse 50   Resp 14   Wt 83.9 kg (185 lb)   BMI 26.54 kg/m    Physical Exam:  GENERAL: no distress  NECK: No JVD  LUNGS: Clear to auscultation.  CARDIAC: regular rhythm, S1 & S2 normal.  No heaves, thrills, gallops or murmurs.  ABDOMEN: flat, negative hepatosplenomegaly, soft and non-tender.  EXTREMITIES: No evidence of cyanosis, clubbing or edema.  Right BKA    Current Outpatient Medications   Medication Sig Dispense Refill    albuterol (PROAIR HFA/PROVENTIL  HFA/VENTOLIN HFA) 108 (90 Base) MCG/ACT inhaler INHALE 2 PUFFS BY MOUTH EVERY 6 HOURS AS NEEDED FOR SHORTNESS OF BREATH OR WHEEZING 18 g 1    apixaban ANTICOAGULANT (ELIQUIS ANTICOAGULANT) 5 MG tablet Take 1 tablet (5 mg) by mouth 2 times daily 180 tablet 3    aspirin 81 MG EC tablet Take 1 tablet (81 mg) by mouth daily 84 tablet 0    baclofen (LIORESAL) 10 MG tablet TAKE 2 TABLETS BY MOUTH 3 TIMES A  tablet 3    doxazosin (CARDURA) 8 MG tablet Take 1 tablet (8 mg) by mouth at bedtime 90 tablet 1    gabapentin (NEURONTIN) 300 MG capsule Take 300 mg by mouth at bedtime Take 600 mg by mouth in the afternoon and 300 mg at bedtime      insulin glargine (LANTUS SOLOSTAR) 100 UNIT/ML pen INJECT 26 UNITS SUBCUTANEOUSLY AT BEDTIME 15 mL 1    insulin pen needle (GLOBAL EASE INJECT PEN NEEDLES) 32G X 4 MM miscellaneous USE AS DIRECTED EVERY  each 1    medical cannabis (Patient's own supply) See Admin Instructions (The purpose of this order is to document that the patient reports taking medical cannabis.  This is not a prescription, and is not used to certify that the patient has a qualifying medical condition.)   Flower - PRN      oxyCODONE IR (ROXICODONE) 10 MG tablet Take 1 tablet (10 mg) by mouth 3 times daily as needed for severe pain In addition to 5 mg four times daily dosing. 90 tablet 0    promethazine (PHENERGAN) 25 MG tablet TAKE 1 TABLET BY MOUTH 3 TIMES A DAY AS NEEDED FOR NAUSEA 270 tablet 0    propranolol (INDERAL) 40 MG tablet Take 1 tablet (40 mg) by mouth 3 times daily 270 tablet 2    rosuvastatin (CRESTOR) 20 MG tablet Take 40 mg by mouth daily      sertraline (ZOLOFT) 100 MG tablet TAKE 2 TABLETS BY MOUTH DAILY (Patient taking differently: TAKE 2 TABLETS BY MOUTH DAILY) 180 tablet 3    triamterene-HCTZ (DYAZIDE) 37.5-25 MG capsule Take 1 capsule by mouth every morning 90 capsule 3    ACE/ARB NOT PRESCRIBED, INTENTIONAL, ACE & ARB not prescribed due to Allergy      Continuous Blood Gluc   (FREESTYLE CACHORRO 2 READER) GASTON USE TO READ BLOOD SUGARS AS PER 'S INSTRUCTIONS (Patient not taking: Reported on 2024) 1 each 0    hydrOXYzine HCl (ATARAX) 25 MG tablet Take 1 tablet (25 mg) by mouth every 6 hours as needed for other (adjuvant pain) (Patient not taking: Reported on 2024) 30 tablet 0    Lidocaine (LIDOCARE) 4 % Patch Place 1 patch onto the skin every 24 hours To prevent lidocaine toxicity, patient should be patch free for 12 hrs daily. (Patient not taking: Reported on 2024) 10 patch 0    lidocaine (LMX4) 4 % external cream Apply topically once as needed for mild pain (Patient not taking: Reported on 2024) 28 g 0    oxyCODONE (ROXICODONE) 5 MG tablet Take 1 tablet (5 mg) by mouth 5 times daily As needed for post op pain in addition to 10mg three times daily scheduled. (Patient not taking: Reported on 2024) 30 tablet 0       Cardiographics:    EC2024 normal sinus rhythm no ischemic changes    Echocardiogram: 2021  Global and regional left ventricular function is normal with an EF of 55-60%.  Right ventricular function, chamber size, wall motion, and thickness are  normal.  No hemodynamcially signifcant valvular abnormalities.  Estimated mean RA pressure is mildly elevated at 8 mmHg.  No pericardial effusion is present     Lab Results    Chemistry/lipid CBC Cardiac Enzymes/BNP/TSH/INR   Recent Labs   Lab Test 24  1155   CHOL 143   HDL 60   LDL 70   TRIG 65     Recent Labs   Lab Test 24  1155 24  0932 10/21/22  1006   LDL 70 113* 105*     Recent Labs   Lab Test 05/10/24  0744      POTASSIUM 3.5   CHLORIDE 103   CO2 27   GLC 92   BUN 8.1   CR 0.71  0.69   GFRESTIMATED >90  >90   [place] 8.8     Recent Labs   Lab Test 05/10/24  0744 24  1939 24  1155   CR 0.71  0.69 0.68 0.67     Recent Labs   Lab Test 24  1105 24  0932 08/10/23  0710   A1C 5.9* 5.9* 6.7*          Recent Labs   Lab Test 05/10/24  0744  "  WBC 7.7   HGB 12.9*   HCT 36.6*   MCV 88        Recent Labs   Lab Test 05/10/24  0744 04/23/24  1155 01/05/24  1208   HGB 12.9* 13.7 15.7    No results for input(s): \"TROPONINI\" in the last 35249 hours.  No results for input(s): \"BNP\", \"NTBNPI\", \"NTBNP\" in the last 91388 hours.  Recent Labs   Lab Test 10/09/18  0923   TSH 1.36     Recent Labs   Lab Test 01/05/24  1208 08/10/23  0710 07/16/22  1342   INR 1.23* 1.11 1.08                         Thank you for allowing me to participate in the care of your patient.      Sincerely,     Markus Fox MD     [facility]  cc:   Dawit Fox MD  [address]  [address]  [place] [address]      "

## 2024-06-04 NOTE — TELEPHONE ENCOUNTER
Spoke to [facility].   They will be refaxing to our main fax # the wheelchair paperwork today.   We did not receive it last week.     Livia CROCKER

## 2024-06-04 NOTE — TELEPHONE ENCOUNTER
Forms/Letter Request    Type of form/letter: DME (wheelchair, hospital bed)    DME Wheelchair  Type of DME requested: Wheelchair    Do we have the form/letter: Yes:     Who is the form from? [facility]    Where did/will the form come from? form was faxed in    When is form/letter needed by: asap    How would you like the form/letter returned: Fax : [phone]    Patient Notified form requests are processed in 5-7 business days:No    Could we send this information to you in [employer] or would you prefer to receive a phone call?:   No preference   Okay to leave a detailed message?: No at Other phone number:  N/A

## 2024-06-04 NOTE — PATIENT INSTRUCTIONS
Erwin Aguilar,    It was a pleasure to see you today at the [facility].     My recommendations after this visit include:    Stress test    CARITO Fox MD, FACC, MATTHEW

## 2024-06-04 NOTE — PROGRESS NOTES
Assessment & Plan     Visit for suture removal  After oral and written informed consent. Approximately 30 interrupted sutures removed from incision line as noted in photo.  About 1/2 were underneath scab tissue and/or deeply embedded in skin.  Initially found removal to be quite painful.  To assist with discomfort soaked the area with warm water compresses first and continued this during removal process.  Ultimately needed to use lidocaine to numb about 1/2 of the suture sites for removal which worked well.  Used 10cc 1% lidocaine without epinepherine for this. Several of the sutures which were more deeply embedded I had to cut of the knot leaving the suture under the skin but then was able to use a tweezers to locate the suture and remove it. All suture areas explored after scabs removed and to my knowledge no sutures left  under the skin.     After removal cleaned the area with warm water and dried area.  Then applied vaseline soaked guaze covered by 4x4 guaze and guaze wrap to secure.  We sent him home with adequate dressing supplies for at least one week.  Will not likely need further dressing supplies after that time.     Total time spent removing sutures 90 minutes.       S/P below knee amputation, right (H)/Obesity  Without prosthesis at this time.  Is in need of wheelchair for mobility since cannot carry items with crutches and cannot walk with cane or walker. Also would have difficulty with shifting weight to prevent pressure ulcer due to below knee amputation and obesity so would recommend back cushion and gel/foam bottom/seat cushion.     Reviewed following items:  Mobility limitation: right below knee amputation.   Able to resolve mobility limitation with cane or walker?: NO  Does home provide adequate access between room,s maneuvering space and surfaces for use of manual wheelchair: yes  Will wheelchair improve ability to participate in MRADLs and will it be used regularly inside and out?  Yes.  Will  allow freedom of use of arms to transfer, bathe, cook, clean, grocer shop, etc.   Is he willing to use? Yes.   Does he have sufficient upper extremity function/strength to use wheelchair and self-propel? Yes  Does he have caregiver who can provide assistance with the wheelchair?  Yes friends when needed but will self-propel.   Recommended wheelchair: standard chair with back rest and seat cushion and leg rests to prevent pressure sores and support leg when sitting.     35 minutes spent on the date of the encounter doing chart review, history and exam, negotiating and explaining the plan with the patient, documentation and further activities as noted above for wheelchair evaluation and document preparation in addition to suture removal.        Follow up as needed or for next annual wellness when due.     The longitudinal plan of care for the diagnosis(es)/condition(s) as documented were addressed during this visit. Due to the added complexity in care, I will continue to support Erwin in the subsequent management and with ongoing continuity of care.        Subjective   Erwin is a 63 year old, presenting for the following health issues:  Wound Check and Minor Procedure (stitches remover from  below right knee amputation.)        6/4/2024     3:01 PM   Additional Questions   Roomed by Oma KUMAR   Accompanied by n/a     History of Present Illness       Reason for visit:  Suture removal    He eats 4 or more servings of fruits and vegetables daily.He consumes 1 sweetened beverage(s) daily.He exercises with enough effort to increase his heart rate 20 to 29 minutes per day.    He is taking medications regularly.                     Objective    /68 (BP Location: Left arm, Patient Position: Sitting, Cuff Size: Adult Regular)   Pulse 58   Temp 97.1  F (36.2  C) (Temporal)   Resp 16   SpO2 99%   There is no height or weight on file to calculate BMI.  Physical Exam                     Signed Electronically by: Demario  Daniel Wegener, MD

## 2024-06-05 ENCOUNTER — TELEPHONE (OUTPATIENT)
Dept: FAMILY MEDICINE | Facility: CLINIC | Age: 64
End: 2024-06-05
Payer: MEDICARE

## 2024-06-05 NOTE — TELEPHONE ENCOUNTER
Home Care is calling regarding an established patient with  [facility].        6/5/2024     3:54 PM   Home Care Information   Date of Home Care episode start 5/31/2024   Current following provider Dr. Wegener   Date provider agreed to follow 5/31/2024    Name/Phone Number RODERICK Robledo [phone]   Home Care agency [facility]     Requesting orders from: Wegener, Joel Daniel Irwin  Provider is following patient: Yes  Is this a 60-day recertification request?  No    Orders Requested  Wound care:   Verbal order requested to use Medihoney on amputation incision      Information was gathered and will be sent to provider for review.  RN will contact Home Care with information after provider review.  Confirmed ok to leave a detailed message with call back.  Contact information confirmed and updated as needed.    Thanks,  Romelia KLEIN RN

## 2024-06-06 ENCOUNTER — TELEPHONE (OUTPATIENT)
Dept: FAMILY MEDICINE | Facility: CLINIC | Age: 64
End: 2024-06-06
Payer: MEDICARE

## 2024-06-06 ENCOUNTER — MEDICAL CORRESPONDENCE (OUTPATIENT)
Dept: HEALTH INFORMATION MANAGEMENT | Facility: CLINIC | Age: 64
End: 2024-06-06
Payer: MEDICARE

## 2024-06-06 ENCOUNTER — MYC MEDICAL ADVICE (OUTPATIENT)
Dept: FAMILY MEDICINE | Facility: CLINIC | Age: 64
End: 2024-06-06
Payer: MEDICARE

## 2024-06-06 NOTE — TELEPHONE ENCOUNTER
Forms/Letter Request    Type of form/letter: Physician Orders   Order #[id number]--Eiq Additional Skilled Nursing Visit to Assess Blood Sugars, Diabetic Management,Perform Wound Cares as ordered by MD, Pain Management, Falls and Safety Teaching  SN -Effective 6/16/24  2WK 1, 1 [id number], 1WK 1    Order #[id number]    One Additional Skilled Nursing Visit to Assess Blood Sugars, Diabetic Management,Perform Wound Cares as ordered by MD, Pain Management, Falls and Safety Teaching   SN Effective 6/9/24 2WK1, 1WK1, [id number], 1WK1       Do we have the form/letter: Yes:     Who is the form from?  [facility]    Where did/will the form come from? form was faxed in    When is form/letter needed by: asap    How would you like the form/letter returned: Fax : [phone]    Patient Notified form requests are processed in 5-7 business days:No    Could we send this information to you in [facility] or would you prefer to receive a phone call?:   No preference   Okay to leave a detailed message?: No at Other phone number:  N/A

## 2024-06-06 NOTE — TELEPHONE ENCOUNTER
Yes, ok to use medihoney as needed to cover wound then cover with 4x4 guaze and kerlix guaze wrap to keep in place.  Change daily and as needed depending on amount of discharge.     Joel Wegener,MD w

## 2024-06-06 NOTE — TELEPHONE ENCOUNTER
Forms faxed to [facility] medical fax # [phone] and copy sent to stat scan.     Livia OLIVEROS  TC

## 2024-06-07 NOTE — TELEPHONE ENCOUNTER
Forms faxed to [facility] fax # [phone] and copy sent to stat scan.     Livia OLIVEROS  TC

## 2024-06-10 ENCOUNTER — TELEPHONE (OUTPATIENT)
Dept: FAMILY MEDICINE | Facility: CLINIC | Age: 64
End: 2024-06-10
Payer: MEDICARE

## 2024-06-10 ENCOUNTER — MEDICAL CORRESPONDENCE (OUTPATIENT)
Dept: HEALTH INFORMATION MANAGEMENT | Facility: CLINIC | Age: 64
End: 2024-06-10
Payer: MEDICARE

## 2024-06-10 NOTE — TELEPHONE ENCOUNTER
Forms/Letter Request    Type of form/letter: OTHER: [facility]  Order # [id number]  MSW eval performed w/ observation and assessment of encounter for orthopedic after following surgical amp, pain assessment, etc    Order # [id number]  SN effective 6/9/24 2WK1, 1WK1, 1 every 2WK2, 1WK1       Do we have the form/letter: Yes: in JW's office    Who is the form from? [facility] (if other please explain)    Where did/will the form come from? form was faxed in    When is form/letter needed by: asap    How would you like the form/letter returned: Fax : [phone]

## 2024-06-11 ENCOUNTER — MYC REFILL (OUTPATIENT)
Dept: FAMILY MEDICINE | Facility: CLINIC | Age: 64
End: 2024-06-11
Payer: MEDICARE

## 2024-06-11 DIAGNOSIS — E78.5 HYPERLIPIDEMIA LDL GOAL <100: Primary | ICD-10-CM

## 2024-06-12 ENCOUNTER — MYC MEDICAL ADVICE (OUTPATIENT)
Dept: FAMILY MEDICINE | Facility: CLINIC | Age: 64
End: 2024-06-12
Payer: MEDICARE

## 2024-06-14 ENCOUNTER — MYC MEDICAL ADVICE (OUTPATIENT)
Dept: FAMILY MEDICINE | Facility: CLINIC | Age: 64
End: 2024-06-14
Payer: MEDICARE

## 2024-06-14 ENCOUNTER — MEDICAL CORRESPONDENCE (OUTPATIENT)
Dept: HEALTH INFORMATION MANAGEMENT | Facility: CLINIC | Age: 64
End: 2024-06-14
Payer: MEDICARE

## 2024-06-14 ENCOUNTER — TELEPHONE (OUTPATIENT)
Dept: FAMILY MEDICINE | Facility: CLINIC | Age: 64
End: 2024-06-14
Payer: MEDICARE

## 2024-06-14 RX ORDER — ROSUVASTATIN CALCIUM 20 MG/1
40 TABLET, COATED ORAL DAILY
Qty: 90 TABLET | Refills: 3 | Status: SHIPPED | OUTPATIENT
Start: 2024-06-14 | End: 2024-07-02

## 2024-06-14 NOTE — TELEPHONE ENCOUNTER
Could they send some pictures?     Sutures were deeply embedded so some bleeding to be expected.  I would continue current wound cares. Joel Wegener,MD w

## 2024-06-14 NOTE — TELEPHONE ENCOUNTER
Homecare RN updated.  Reached out to Erwin via nivio to see if he is able to send picture.    Roslyn HUERTAS RN

## 2024-06-14 NOTE — TELEPHONE ENCOUNTER
FLORINDA,    Homecare RN calling with update on amputation site.  Right amputation site bleeding where stitches were removed.  Is scabbed over now.  Just changed bandage last night- there was a little bit of dried blood on dressing.  Triage RN asked about bleeding currently- does not sound like it is actively bleeding.  However, homecare RN wondering about next steps.    Homecare RN: [phone]    Roslyn HUERTAS RN

## 2024-06-14 NOTE — TELEPHONE ENCOUNTER
JW,  Please see below [employer] message and advise.  Not sure about getting photos from homecare RN? She said she could only email  Thanks,  Roslyn MORFIN RN

## 2024-06-16 ENCOUNTER — MYC REFILL (OUTPATIENT)
Dept: FAMILY MEDICINE | Facility: CLINIC | Age: 64
End: 2024-06-16
Payer: MEDICARE

## 2024-06-16 DIAGNOSIS — I48.0 PAROXYSMAL ATRIAL FIBRILLATION (H): ICD-10-CM

## 2024-06-17 ENCOUNTER — TELEPHONE (OUTPATIENT)
Dept: FAMILY MEDICINE | Facility: CLINIC | Age: 64
End: 2024-06-17
Payer: MEDICARE

## 2024-06-17 NOTE — TELEPHONE ENCOUNTER
Prior Authorization Retail Medication Request    Medication/Dose: Rosuvastatin 20 mg  Diagnosis and ICD code (if different than what is on RX):  E78.5  New/renewal/insurance change PA/secondary ins. PA:  Previously Tried and Failed:   Rationale:      Insurance   Primary:   Insurance ID:  [employer] [id number]    Secondary (if applicable):  Insurance ID:      Pharmacy Information (if different than what is on RX)  Name:    [id number] - [place], [place] - [address] AT [facility] [address]     Phone:    Fax:

## 2024-06-19 NOTE — H&P
Pre-Operative H & P     CC:  Preoperative exam to assess for increased cardiopulmonary risk while undergoing surgery and anesthesia.    Date of Encounter: 7/3/2023  Primary Care Physician:  Wegener, Joel Daniel Irwin     Reason for visit:   Encounter Diagnoses   Name Primary?     Pre-op evaluation Yes     Cubital tunnel syndrome on left      Carpal tunnel syndrome of left wrist        HPI  Erwin Aguilar is a 62 year old male who presents for pre-operative H & P in preparation for  Procedure Information     Case: [id number] Date/Time: 07/07/23 1150    Procedures:       RELEASE, LEFT CUBITAL TUNNEL (Left: Elbow)      RELEASE, LEFT CARPAL TUNNEL (Left: Wrist)    Anesthesia type: MAC with Block    Diagnosis:       Cubital tunnel syndrome on left [G56.22]      Left carpal tunnel syndrome [G56.02]    Pre-op diagnosis:       Cubital tunnel syndrome on left [G56.22]      Left carpal tunnel syndrome [G56.02]    Location: [place] [address] / [facility]    Providers: Deny Dixon MD          Patient is being evaluated for comorbid conditions of hypertension, hyperlipidemia, paroxysmal atrial fibrillation, asthma, chronic sinusitis, anemia, osteoarthritis, type 2 diabetes, GERD, cirrhosis, gastroparesis, anxiety, depression, and history of alcohol abuse.    He has a history of left cubital tunnel syndrome for which she follows with Dr. Dixon orthopedic surgery.  He was last seen in follow-up on 6/5/2023 where he noted ongoing symptoms.  Treatment options were discussed at that visit and a plan was made to proceed with surgery as scheduled above.    History is obtained from the patient and chart review    Hx of abnormal bleeding, anticoagulation, or anti-platelet use: Eliquis      Past Medical History  Past Medical History:   Diagnosis Date     Actinic keratosis     aldara     Anxiety      Cancer (H)     squamous cell skin CA     Cauda equina spinal cord injury (H)      Chronic  sinusitis 5-1-16     Depressive disorder      Diastasis recti      Esophageal reflux      Esophageal varices in cirrhosis (H) 4/1/2014    Hospitalized for UGI blee 3/28/14, endoscopy revealed bleeding varices.     Essential hypertension, benign      Intermittent asthma      Mild depression      Mixed hyperlipidemia      Nasal polyps 5-1-16     Osteoarthritis of lumbar spine, unspecified spinal osteoarthritis complication status      Other chronic pain      Paroxysmal atrial fibrillation (H) 9/22/2021     SCCA (squamous cell carcinoma) of skin      Seasonal allergic conjunctivitis      Type II or unspecified type diabetes mellitus without mention of complication, not stated as uncontrolled      Unspecified site of spinal cord injury without evidence of spinal bone injury     due to back surgery       Past Surgical History  Past Surgical History:   Procedure Laterality Date     ABDOMEN SURGERY  2014     BACK SURGERY  August 2009     COLONOSCOPY N/A 5/12/2016    Procedure: COMBINED COLONOSCOPY, SINGLE OR MULTIPLE BIOPSY/POLYPECTOMY BY BIOPSY;  Surgeon: Ana Paula Urbina MD;  Location:  GI     ENDOSCOPY UPPER, COLONOSCOPY, COMBINED  10/19/2011    Procedure:COMBINED ENDOSCOPY UPPER, COLONOSCOPY; Upper Endoscopy, Colonoscopy with Polypectomy x4; Surgeon:AMBAR RODRÍGUEZ; Location: OR     ENT SURGERY  1-2016    Ongoing since then     ESOPHAGOSCOPY, GASTROSCOPY, DUODENOSCOPY (EGD), COMBINED  3/28/2014    Procedure: COMBINED ESOPHAGOSCOPY, GASTROSCOPY, DUODENOSCOPY (EGD);  EGD, Gastric Biopsies, Esophageal Banding;  Surgeon: Reyna Tovar MD;  Location:  OR     ESOPHAGOSCOPY, GASTROSCOPY, DUODENOSCOPY (EGD), COMBINED  6/9/2014    Procedure: COMBINED ESOPHAGOSCOPY, GASTROSCOPY, DUODENOSCOPY (EGD);  Surgeon: Curtis Mendez MD;  Location:  GI     ESOPHAGOSCOPY, GASTROSCOPY, DUODENOSCOPY (EGD), COMBINED  7/24/2014    Procedure: COMBINED ESOPHAGOSCOPY, GASTROSCOPY, DUODENOSCOPY (EGD);  Surgeon: Td  Gerard Gross MD;  Location: [facility] OR     ESOPHAGOSCOPY, GASTROSCOPY, DUODENOSCOPY (EGD), COMBINED N/A 10/31/2014    Procedure: COMBINED ESOPHAGOSCOPY, GASTROSCOPY, DUODENOSCOPY (EGD);  Surgeon: Gerard Samaniego MD;  Location: [facility] OR     ESOPHAGOSCOPY, GASTROSCOPY, DUODENOSCOPY (EGD), COMBINED N/A 5/12/2016    Procedure: COMBINED ESOPHAGOSCOPY, GASTROSCOPY, DUODENOSCOPY (EGD);  Surgeon: Ana Paula Urbina MD;  Location: [facility] GI     ESOPHAGOSCOPY, GASTROSCOPY, DUODENOSCOPY (EGD), COMBINED N/A 8/2/2018    Procedure: COMBINED ESOPHAGOSCOPY, GASTROSCOPY, DUODENOSCOPY (EGD);  EGD;  Surgeon: Yu Wagner MD;  Location: [facility] GI     HCL SQUAMOUS CELL CARCINOMA AG  10/07    left forearm     HERNIORRHAPHY UMBILICAL  11/8/2012    Procedure: HERNIORRHAPHY UMBILICAL;  Open Umbilical Hernia Repair With Mesh ;  Surgeon: Chase Nicholson MD;  Location: [facility] OR     INSERT STIMULATOR DORSAL COLUMN N/A 6/28/2018    Procedure: INSERT STIMULATOR DORSAL COLUMN;;  Surgeon: Elvis Sauceda MD;  Location: [facility] OR     neuro stimulator  2010     REMOVE GENERATOR STIMULATOR (LOCATION) N/A 6/28/2018    Procedure: REMOVE GENERATOR STIMULATOR (LOCATION);  Spinal Cord Stimulator and IPG Explant and Re-Implant of SCS System (Leads and IPG);  Surgeon: Elvis Sauceda MD;  Location: [facility] OR     REPAIR TENDON ACHILLES Right 11/11/2020    Procedure: Right achilles debridement and partial calcaneus excision;  Surgeon: Eh Sandoval MD;  Location: [facility] OR     SURGICAL HISTORY OF -   1/02    abcess tooth     SURGICAL HISTORY OF -   1999    L4-5 laminectomy, cauda equina syndrome     SURGICAL HISTORY OF -   +    herniated disk repair     TONSILLECTOMY  10 1964     TRANSPOSITION ULNAR NERVE (ELBOW)      right     ZZC APPENDECTOMY  1974       Prior to Admission Medications  Current Outpatient Medications   Medication Sig Dispense Refill     albuterol (PROAIR HFA/PROVENTIL HFA/VENTOLIN  HFA) 108 (90 Base) MCG/ACT inhaler INHALE 2 PUFFS BY MOUTH EVERY 6 HOURS AS NEEDED FOR SHORTNESS OF BREATH/DYSPNEA OR WHEEZING 18 g 1     apixaban ANTICOAGULANT (ELIQUIS ANTICOAGULANT) 5 MG tablet Take 1 tablet (5 mg) by mouth 2 times daily 180 tablet 1     baclofen (LIORESAL) 10 MG tablet TAKE 2 TABLETS BY MOUTH THREE TIMES DAILY 180 tablet 3     doxazosin (CARDURA) 8 MG tablet TAKE 1 TABLET (8 MG) BY MOUTH AT BEDTIME 90 tablet 1     gabapentin (NEURONTIN) 300 MG capsule 300 mg afternoon and 600 mg at bedtime 90 capsule 1     insulin glargine (LANTUS PEN) 100 UNIT/ML pen Inject 26 Units Subcutaneous At Bedtime 30 mL 3     medical cannabis (Patient's own supply) See Admin Instructions (The purpose of this order is to document that the patient reports taking medical cannabis.  This is not a prescription, and is not used to certify that the patient has a qualifying medical condition.)   Flower - PRN       oxyCODONE (ROXICODONE) 5 MG tablet Take 1 tablet (5 mg) by mouth 3 times daily (visit with Dr. Wegener every three months) (Patient taking differently: Take 5 mg by mouth 3 times daily as needed for pain (visit with Dr. Wegener every three months)) 90 tablet 0     promethazine (PHENERGAN) 25 MG tablet TAKE 1 TABLET(25 MG) BY MOUTH THREE TIMES DAILY AS NEEDED FOR NAUSEA 90 tablet 4     propranolol (INDERAL) 40 MG tablet Take 2 tablets (80 mg) by mouth 2 times daily 360 tablet 3     rosuvastatin (CRESTOR) 20 MG tablet Take 1 tablet (20 mg) by mouth daily 90 tablet 3     sertraline (ZOLOFT) 100 MG tablet TAKE 2 TABLETS BY MOUTH DAILY 180 tablet 3     triamterene-HCTZ (DYAZIDE) 37.5-25 MG capsule Take 2 capsules by mouth every morning (Patient taking differently: Take 1 capsule by mouth every morning) 180 capsule 3     ACE/ARB NOT PRESCRIBED, INTENTIONAL, ACE & ARB not prescribed due to Allergy       GLOBAL EASE INJECT PEN NEEDLES 32G X 4 MM miscellaneous USE AS DIRECTED EVERY  each 1       Allergies  Allergies    Allergen Reactions     Levaquin Anaphylaxis and Rash     Swelling in lip and tongue felt thick     Lisinopril Anaphylaxis     Swollen tongue; inability to swallow; drooling; hives; swollen face, neck, angioedema     Acetaminophen      Hx of cirrhosis      Amlodipine      Swelling, hives, possible angioedema       Morphine      b/p dropped and arms went numb  Hypotension     Quinolones Hives     Spironolactone Unknown     Pt believes himself to be allergic to it; cannot find his old records of this.-mjc      Bactrim [Sulfamethoxazole-Trimethoprim] Rash       Social History  Social History     Socioeconomic History     Marital status: Single     Spouse name: Not on file     Number of children: 0     Years of education: Not on file     Highest education level: Not on file   Occupational History     Occupation: sales     Employer: UNEMPLOYED   Tobacco Use     Smoking status: Former     Packs/day: 0.00     Years: 1.00     Pack years: 0.00     Types: Cigarettes     Start date: 10/13/1983     Quit date: 1984     Years since quittin.0     Passive exposure: Past     Smokeless tobacco: Never   Vaping Use     Vaping Use: Never used   Substance and Sexual Activity     Alcohol use: Not Currently     Comment: a pint of alcohol / day - last drink was 3/28/14     Drug use: Yes     Frequency: 2.0 times per week     Types: Marijuana     Comment: medical marijuana - vape daily     Sexual activity: Not Currently     Partners: Female     Birth control/protection: Abstinence   Other Topics Concern     Parent/sibling w/ CABG, MI or angioplasty before 65F 55M? No   Social History Narrative     Not on file     Social Determinants of Health     Financial Resource Strain: Not on file   Food Insecurity: Not on file   Transportation Needs: No Transportation Needs (2020)    PRAPARE - Transportation      Lack of Transportation (Medical): No      Lack of Transportation (Non-Medical): No   Physical Activity: Inactive (2020)     Exercise Vital Sign      Days of Exercise per Week: 0 days      Minutes of Exercise per Session: 0 min   Stress: Not on file   Social Connections: Unknown (12/31/2020)    Social Connection and Isolation Panel [NHANES]      Frequency of Communication with Friends and Family: Not on file      Frequency of Social Gatherings with Friends and Family: Not on file      Attends [employer] Services: Not on file      Active Member of Clubs or Organizations: Not on file      Attends Club or Organization Meetings: Not on file      Marital Status:    Intimate Partner Violence: Not on file   Housing Stability: Not on file       Family History  Family History   Problem Relation Age of Onset     Cancer Mother         lung     Breast Cancer Mother      Other Cancer Mother      Cancer Father         esophogeal, GERD     Snoring Father      Diabetes Brother         amputation, Type 1     Diabetes Brother      Diabetes Brother      Cancer - colorectal No family hx of      Glaucoma No family hx of      Macular Degeneration No family hx of      Anesthesia Reaction No family hx of      Venous thrombosis No family hx of        Review of Systems  The complete review of systems is negative other than noted in the HPI or here.   Anesthesia Evaluation   Pt has had prior anesthetic.     No history of anesthetic complications       ROS/MED HX  ENT/Pulmonary: Comment: Chronic sinusitis    (+) ALISIA risk factors, hypertension, tobacco use, Past use, Intermittent, asthma Treatment: Inhaler prn,   (-) COPD   Neurologic: Comment: History of cauda equina    (+) Spinal cord injury (surgical injury), year sustained: 2009,     Cardiovascular:     (+) Dyslipidemia hypertension-----dysrhythmias, a-fib, Previous cardiac testing   Echo: Date: 9/7/2021 Results:  Interpretation Summary   Global and regional left ventricular function is normal with an EF of 55-60%.   Right ventricular function, chamber size, wall motion, and thickness are normal.   No  "hemodynamcially signifcant valvular abnormalities.   Estimated mean RA pressure is mildly elevated at 8 mmHg.   No pericardial effusion is present.   There is no prior study for direct comparison.  Stress Test: Date: Results:    ECG Reviewed: Date: 10/25/2021 Results:  Holter  Conclusion: A tendency toward sinus bradycardia was noted throughout the monitoring time with average resting heart rate approximately 45 bpm and average heart rate less than 65 bpm which could indicate some chronotropic incompetence in an active patient. These findings could be consistent with sinus node dysfunction. No atrial fibrillation was noted.   Cath:  Date: Results:   (-) CAD and CHAVEZ   METS/Exercise Tolerance: 1 - Eating, dressing Comment: Limited due to chronic pain, walks with a cane. Denies cardiac symptoms   Hematologic:     (+) anemia, history of blood transfusion, no previous transfusion reaction,  (-) history of blood clots   Musculoskeletal: Comment: History of drop foot, right  Ruptured achilles tendon, right  (+) arthritis,     GI/Hepatic: Comment: Gastroparesis  History of esophageal varices with bleed  Chronic nausea on phenergan    (+) liver disease (Cirrhosis),  (-) GERD   Renal/Genitourinary:  - neg Renal ROS     Endo:     (+) type II DM, Last HgA1c: 5.8, date: 5/1/2023, Using insulin, - not using insulin pump. not previously admitted for DM/DKA.  (-) thyroid disease and chronic steroid usage   Psychiatric/Substance Use:     (+) psychiatric history anxiety and depression alcohol abuse (sober since 2014) Recreational drug usage: Cannabis.    Infectious Disease:  - neg infectious disease ROS     Malignancy:   (+) Malignancy, History of Skin.Skin CA Remission status post Surgery.        Other:  - neg other ROS    (+) , H/O Chronic Pain,        BP (!) 175/97 (BP Location: Right arm, Patient Position: Sitting, Cuff Size: Adult Large)   Pulse 56   Temp 98.4  F (36.9  C) (Oral)   Ht 1.803 m (5' 11\")   Wt 97.5 kg (215 lb)  "  SpO2 97%   BMI 29.99 kg/m      Physical Exam   Constitutional: Pleasant, well-appearing male, no apparent distress, and appears stated age.  Eyes: Pupils equal, round and reactive to light, extra ocular muscles intact, sclera clear, conjunctiva normal.  HENT: Normocephalic and atraumatic, oral pharynx with moist mucus membranes, upper and lower dentures. No goiter appreciated.   Respiratory: Clear to auscultation bilaterally, no crackles or wheezing.  Cardiovascular: Regular rate and rhythm, normal S1 and S2, and no murmur noted.  Carotids +2, no bruits. No edema. Palpable pulses to radial  DP and PT arteries.   GI: Normal bowel sounds, soft, non-distended, non-tender, no masses palpated, no hepatosplenomegaly.  Lymph/Hematologic: No cervical lymphadenopathy and no supraclavicular lymphadenopathy.  Genitourinary:  Deferred  Skin: Warm and dry.  No rashes on exposed skin   Musculoskeletal: Full ROM of neck. There is no redness, warmth, or swelling of the visible joints. Gross motor strength is normal.    Neurologic: Awake, alert, oriented to name, place and time. Cranial nerves II-XII are grossly intact. Gait is normal.   Neuropsychiatric: Calm, cooperative. Normal affect.      Prior Labs/Diagnostic Studies   All labs and imaging personally reviewed        Latest Reference Range & Units 05/01/23 13:51   Sodium 136 - 145 mmol/L 139   Potassium 3.4 - 5.3 mmol/L 3.9   Chloride 98 - 107 mmol/L 102   Carbon Dioxide (CO2) 22 - 29 mmol/L 25   Urea Nitrogen 8.0 - 23.0 mg/dL 13.2   Creatinine 0.67 - 1.17 mg/dL 0.78   GFR Estimate >60 mL/min/1.73m2 >90   Calcium 8.8 - 10.2 mg/dL 9.5   Anion Gap 7 - 15 mmol/L 12   Albumin 3.5 - 5.2 g/dL 4.5   Protein Total 6.4 - 8.3 g/dL 7.4   Alkaline Phosphatase 40 - 129 U/L 85   ALT 10 - 50 U/L 21   AST 10 - 50 U/L 22   Bilirubin Total <=1.2 mg/dL 0.4   Glucose 70 - 99 mg/dL 85   Hemoglobin A1C 0.0 - 5.6 % 5.8 (H)   (H): Data is abnormally high     Latest Reference Range & Units 07/16/22  13:42   WBC 4.0 - 11.0 10e3/uL 9.0   Hemoglobin 13.3 - 17.7 g/dL 14.0   Hematocrit 40.0 - 53.0 % 39.4 (L)   Platelet Count 150 - 450 10e3/uL 187   RBC Count 4.40 - 5.90 10e6/uL 4.50   MCV 78 - 100 fL 88   MCH 26.5 - 33.0 pg 31.1   MCHC 31.5 - 36.5 g/dL 35.5   RDW 10.0 - 15.0 % 12.6   % Neutrophils % 62   % Lymphocytes % 24   % Monocytes % 9   % Eosinophils % 4   % Basophils % 1   Absolute Basophils 0.0 - 0.2 10e3/uL 0.1   Absolute Eosinophils 0.0 - 0.7 10e3/uL 0.3   Absolute Immature Granulocytes <=0.4 10e3/uL 0.0   Absolute Lymphocytes 0.8 - 5.3 10e3/uL 2.1   Absolute Monocytes 0.0 - 1.3 10e3/uL 0.8   % Immature Granulocytes % 0   Absolute Neutrophils 1.6 - 8.3 10e3/uL 5.6   Absolute NRBCs 10e3/uL 0.0   NRBCs per 100 WBC <1 /100 0   Sed Rate 0 - 20 mm/hr 15   (L): Data is abnormally low    EKG/ stress test - if available please see in ROS above   Echo result w/o MOPS: Interpretation SummaryGlobal and regional left ventricular function is normal with an EF of 55-60%.Right ventricular function, chamber size, wall motion, and thickness arenormal.No hemodynamcially signifcant valvular abnormalities.Estimated mean RA pressure is mildly elevated at 8 mmHg.No pericardial effusion is present. There is no prior study for direct comparison.    The patient's records and results personally reviewed by this provider.     Outside records reviewed from: Care Everywhere    LAB/DIAGNOSTIC STUDIES TODAY:  None    Assessment  Erwin Aguilar is a 62 year old male seen as a PAC referral for risk assessment and optimization for anesthesia.    Plan/Recommendations  Pt will be optimized for the proposed procedure.  See below for details on the assessment, risk, and preoperative recommendations    NEUROLOGY  - No history of TIA, CVA or seizure  - History of intra-operative spineal cord injury 2009 with cauda equina syndrome. [employer] spinal cord stimulator in place.   - Chronic Pain  On chronic opiates, morphine equivilant = 22.5 MME/Day    -Post Op delirium risk factors:  High co-morbid index    HEENT  - No current airway concerns.  Will need to be reassessed day of surgery.  Mallampati: III  TM: > 3   - upper and lower dentures    CARDIAC  - No history of CAD  - Afib  [id number] score:    - Hypertension; with history of allergies to multiple antihypertensives.  Patient is currently medically managed on doxazosin, propanolol, triamterene-HCTZ.  His blood pressures have remained uncontrolled despite this regimen.  He reports that his BP is even elevated at home ranging around 150/80.  He has stopped checking his blood pressures because this is stressing him out.  Patient was discussed with Dr. Mcdermott, who also spoke with the patient, he will coordinate with anesthesia providers DOS.    Blood pressure elevated at today's exam.  Recommended checking in the days leading up to surgery and continuing antihypertensive medications as prescribed.    - Hyperlipidemia  Well controlled on home regimen     - Patient reports that his HR will drop into the 40s when he is sleeping at night, stating he wanted his anesthesia team to be aware of this.     - METS (Metabolic Equivalents)  Patient CANNOT perform 4 METS exercise due to chronic pain. Denies cardiac symptoms.          Total Score: 1    Functional Capacity: Unable to complete 4 METS      RCRI-Low risk: Class 2 0.9% complication rate            Total Score: 1    RCRI: Diabetes        PULMONARY    ALISIA Medium Risk            Total Score: 3    ALISIA: Hypertension    ALISIA: Over 50 ys old    ALISIA: Male      - Asthma  Intermittent with as needed albuterol inhaler.  Patient denies recent inhaler use or respiratory concerns today.  Lungs clear to auscultation on exam.  - Tobacco History    History   Smoking Status     Former     Packs/day: 0.00     Years: 1.00     Types: Cigarettes     Start date: 10/13/1983     Quit date: 6/9/1984   Smokeless Tobacco     Never       GI  - History of GERD, resolved when patient  Quality 226: Preventive Care And Screening: Tobacco Use: Screening And Cessation Intervention: Patient screened for tobacco use and is an ex/non-smoker "stopped drinking alcohol  PONV Medium Risk  Total Score: 2           1 AN PONV: Patient is not a current smoker    1 AN PONV: Intended Post Op Opioids       - History of alcohol abuse/cirrhosis; sober since 2014. LFTs 5/2/2021 within normal limits.  - History of esophageal varices with GI bleed. No current concerns. Last EGD 2018 with normal esophagus.     /RENAL  - Baseline Creatinine  0.78    ENDOCRINE    - BMI: Estimated body mass index is 29.99 kg/m  as calculated from the following:    Height as of this encounter: 1.803 m (5' 11\").    Weight as of this encounter: 97.5 kg (215 lb).  Overweight (BMI 25.0-29.9)  - Diabetes  Hemoglobin A1C (%)   Date Value   05/01/2023 5.8 (H)   04/09/2021 6.0 (H)     Diabetes Type 2, insulin dependent. Hold morning oral hypoglycemic medications and short acting insulin DOS. Take 80% of last scheduled dose of long-acting insulin prior to procedure.  Recommend close monitoring of the patient's blood glucose levels throughout the perioperative period.    HEME  VTE Low Risk 0.5%            Total Score: 3    VTE: Greater than 59 yrs old    VTE: Male      - Coagulopathy secondary to Apixaban (Eliquis). Plan for 1 day hold prior to surgery, see PAC pharmacist note for details.   - Chronic anemia, last hgb on 7/16/22 within normal limits  Recommend perioperative use of blood conservation techniques intraoperatively and close monitoring for postoperative bleeding.  - History of blood transfusion, denies transfusion reaction or known antibodies    MSK  - History of multiple orthopedic surgeries  - History of drop foot and achilles tendon rupture, right  - Uses a cane to walk, consider fall risk precautions.  - Recommend consideration for careful positioning to limit patient discomfort.      PSYCH  - Anxiety, depression; well controlled on current medication regimen    Different anesthesia methods/types have been discussed with the patient, but they are aware that the final plan will be " Detail Level: Detailed decided by the assigned anesthesia provider on the date of service.  Patient was discussed with Dr Mcdermott    The patient is optimized for their procedure. AVS with information on surgery time/arrival time, meds and NPO status given by nursing staff. No further diagnostic testing indicated.      On the day of service:     Prep time: 10 minutes  Visit time: 20 minutes  Documentation time: 21 minutes  ------------------------------------------  Total time: 51 minutes      Miya Cummings PA-C  Preoperative Assessment Center  [facility]  Phone: [phone]  Fax: [phone]

## 2024-06-22 ENCOUNTER — TELEPHONE (OUTPATIENT)
Dept: ORTHOPEDICS | Facility: CLINIC | Age: 64
End: 2024-06-22
Payer: MEDICARE

## 2024-06-22 ENCOUNTER — MYC MEDICAL ADVICE (OUTPATIENT)
Dept: FAMILY MEDICINE | Facility: CLINIC | Age: 64
End: 2024-06-22
Payer: MEDICARE

## 2024-06-22 ENCOUNTER — TELEPHONE (OUTPATIENT)
Dept: OTHER | Facility: CLINIC | Age: 64
End: 2024-06-22
Payer: MEDICARE

## 2024-06-22 NOTE — TELEPHONE ENCOUNTER
[facility] Call Center    Phone Message    May a detailed message be left on voicemail: yes     Reason for Call: Symptoms or Concerns     If patient has red-flag symptoms, warm transfer to triage line    Current symptom or concern: below the knee amputation 5/9/24, wound has opened and draining a lot filling bandages     Symptoms have been present for:  2 weeks    Has patient previously been seen for this? Yes    By : Parris     Date: 5/9/24    Are there any new or worsening symptoms? Yes: wound getting bigger     Action Taken: Message routed to:  [facility] ([facility]): Parris     Travel Screening: Not Applicable     Date of Service:

## 2024-06-22 NOTE — TELEPHONE ENCOUNTER
PA Initiation    Medication: ROSUVASTATIN CALCIUM 20 MG PO TABS  Insurance Company: [employer] - Phone [phone] Fax [phone]  Pharmacy Filling the Rx: [id number] - [place], [place] - [facility] AT [facility] [address]  Filling Pharmacy Phone:    Filling Pharmacy Fax:    Start Date: 6/22/2024

## 2024-06-22 NOTE — TELEPHONE ENCOUNTER
[facility] Focus Note    Patient's chart reviewed including most recent labs, vitals, notes. No nursing concerns.   Visit Vitals  /67 (BP Location: RUE - Right upper extremity, Patient Position: Standing)   Pulse (!) 102   Temp 98.6 °F (37 °C) (Oral)   Resp 16   Ht 5' 4\" (1.626 m)   Wt 75.9 kg (167 lb 5.3 oz)   SpO2 95%   BMI 28.72 kg/m²     Orthostatic VS negative this AM.  Hospital medicine will continue to follow.  Isolation to be completed 1/1/24 per ID.    ION Queen     Prior Authorization Not Needed per Insurance    Medication: ROSUVASTATIN CALCIUM 20 MG PO TABS  Insurance Company: [employer] - Phone [phone] Fax [phone]  Pharmacy Filling the Rx: [facility] #[id number] - [place], [place] - [address] AT [facility] [address]  Pharmacy Notified: yes  Patient Notified: no

## 2024-06-22 NOTE — TELEPHONE ENCOUNTER
"Brief orthopedic telephone note:    Status post right transtibial BKA on 5/9/2024 by Dr. Nye.    Patient called the triage line stating that he was having drainage from his residual limb.  He states he had his sutures taken out by his primary care physician on 6/4/24 under local anesthesia, and it has been oozing bloody drainage since.  The bloody drainage has increased slightly since the suture removal.  He states he has been cleaning his incision with a spray and wiping it to clean it.  He has been dressing his wound with gauze dressings and has had to change them 3 times daily due to the bleeding.  He denies any fevers or chills.  He states he is feeling hopeless and depressed, denies any thoughts of hurting himself or others.  He feels hopeless as he is currently sharing wheelchair with his wife and he is unable to get out of the house.  He states he is not able to care for himself and states that he has been \" stuck in the house since surgery\".  He reports he currently does have a nurse coming every few days to help him.        I recommended that if the patient was not feeling safe at home and was unable to care for himself, that he should call the emergency line or obtain a ride to the hospital where there are social work and case management teams available to help him.  The patient was quite opposed to this idea as he did not want to wait in the waiting room for hours.  I expressed empathy for the situation that he was in.  The wound itself given the clinical photos that the patient sent in does not appear to be dehisced, though is draining a fair amount multiple weeks following suture removal.  Recommended the patient continue to change his dressings and call Dr. Nye's office on Monday to obtain an appointment to see Dr. Nye again in clinic.  In addition, I recommended the patient call the number that his nurse had given him to see if he can obtain some additional dressing supplies from the nurse who " comes to see him in his home.     Paula See MD PGY1  Orthopedic Surgery

## 2024-06-24 NOTE — TELEPHONE ENCOUNTER
Elijah NEELY, homecare RN also calling to follow-up.  Saw patient today.  Describes wound as a little swollen and warm to the touch.  Patient not happy with current surgeon/clinic.  Wondering if there is a way to refer to new surgeon or get him in touch with wound care clinic based on images.  Is having a lot of trouble with getting out of house, wheelchair/transportation issues.  Elijah did reach out to [facility] wound team to see if there are other recommendations to help manage wound.    Please advise.  Elijah number- [phone]    Roslyn HUERTAS, RN

## 2024-06-24 NOTE — TELEPHONE ENCOUNTER
ATC called and spoke to patient to get them scheduled for a follow up visit with Dr. Nye.  Successfully scheduled for 7/12/24.  Patient is receiving home nursing services for his wound care and dressing changes.  He insists his wound his stabilized at this time and denied all s/s of acute infection.    Elvis Foster MS, ATC, LAT, [facility]  Dr. Gaudencio Nye

## 2024-06-24 NOTE — TELEPHONE ENCOUNTER
RN called patient to discuss appointment. RN waiting on phone for patient for a couple minutes. RN will try calling patient back at a more convenient time.     Melissa Zavaleta RN

## 2024-06-24 NOTE — TELEPHONE ENCOUNTER
ATC called pt and relayed Lucero RN, message to him. Please schedule pt with Dr. Nye this Friday at 3:40 when pt calls back.       -BERNABE Aragon- [facility]

## 2024-06-26 ENCOUNTER — TELEPHONE (OUTPATIENT)
Dept: FAMILY MEDICINE | Facility: CLINIC | Age: 64
End: 2024-06-26
Payer: MEDICARE

## 2024-06-26 NOTE — TELEPHONE ENCOUNTER
To me doesn't look infected but likely appropriate amount of inflammation/warmth for healing wound.     I would continue cares to soften the scab in the incision wound.     As I believe he is doing he could apply medi-honey to the scab part o the wound or antibiotic ointment and cover with gauze/wrap to keep the ointment on and gently try to clean/debride the wound once a day with a luke warm wet wash cloth.     Let me know if he would also like a wound care referral.     Joel Wegener,MD

## 2024-06-26 NOTE — TELEPHONE ENCOUNTER
Home Care is calling regarding an established patient with  [facility].        6/5/2024     3:54 PM   Home Care Information   Date of Home Care episode start 5/31/2024   Current following provider Dr. Wegener   Date provider agreed to follow 5/31/2024    Name/Phone Number RODERICK Robledo [phone]   Home Care agency [facility]     Requesting orders from: Wegener, Joel Daniel Irwin  Provider is following patient: Yes  Is this a 60-day recertification request?  No    Orders Requested    Skilled Nursing  Request for continuation of care with increase in frequency  Frequency:  Increase from every other week to Skilled nursing visit weekly  Due to bleeding after having stitches removed    Michell from [facility] ([phone])    Verbal orders given.  Home Care will send orders for provider to sign.  Confirmed ok to leave a detailed message with call back.  Contact information confirmed and updated as needed.    Coco Villar RN

## 2024-06-27 ENCOUNTER — MEDICAL CORRESPONDENCE (OUTPATIENT)
Dept: HEALTH INFORMATION MANAGEMENT | Facility: CLINIC | Age: 64
End: 2024-06-27
Payer: MEDICARE

## 2024-06-28 NOTE — TELEPHONE ENCOUNTER
Forms faxed to [facility] fax # [phone] and copy sent to stat scan.     Livia OLIVEROS  TC

## 2024-06-30 ENCOUNTER — MYC REFILL (OUTPATIENT)
Dept: FAMILY MEDICINE | Facility: CLINIC | Age: 64
End: 2024-06-30
Payer: MEDICARE

## 2024-06-30 DIAGNOSIS — G89.4 CHRONIC PAIN SYNDROME: ICD-10-CM

## 2024-06-30 DIAGNOSIS — M25.571 CHRONIC PAIN OF RIGHT ANKLE: ICD-10-CM

## 2024-06-30 DIAGNOSIS — G89.29 CHRONIC PAIN OF RIGHT ANKLE: ICD-10-CM

## 2024-06-30 DIAGNOSIS — M79.671 INTRACTABLE RIGHT HEEL PAIN: ICD-10-CM

## 2024-06-30 DIAGNOSIS — M21.6X1 ACQUIRED CALCANEUS DEFORMITY OF RIGHT ANKLE: ICD-10-CM

## 2024-06-30 DIAGNOSIS — M65.28 CALCIFIC ACHILLES TENDINITIS: ICD-10-CM

## 2024-06-30 DIAGNOSIS — Z98.890 H/O FOOT SURGERY: ICD-10-CM

## 2024-06-30 DIAGNOSIS — S86.011A RUPTURE OF RIGHT ACHILLES TENDON, INITIAL ENCOUNTER: ICD-10-CM

## 2024-07-01 RX ORDER — OXYCODONE HYDROCHLORIDE 10 MG/1
10 TABLET ORAL 3 TIMES DAILY PRN
Qty: 90 TABLET | Refills: 0 | Status: SHIPPED | OUTPATIENT
Start: 2024-07-01 | End: 2024-07-30

## 2024-07-02 ENCOUNTER — MYC MEDICAL ADVICE (OUTPATIENT)
Dept: FAMILY MEDICINE | Facility: CLINIC | Age: 64
End: 2024-07-02
Payer: MEDICARE

## 2024-07-02 DIAGNOSIS — E78.5 HYPERLIPIDEMIA LDL GOAL <100: Primary | ICD-10-CM

## 2024-07-02 RX ORDER — ROSUVASTATIN CALCIUM 40 MG/1
40 TABLET, COATED ORAL DAILY
Qty: 90 TABLET | Refills: 3 | Status: SHIPPED | OUTPATIENT
Start: 2024-07-02

## 2024-07-02 NOTE — TELEPHONE ENCOUNTER
FLORINDA FARRELL already initiated 6/17.  Not needed per insurance.    Spoke with pharmacy.  Issue with quantity- will only cover 1.5 tablets a day.    RN pended updated to one 40 mg tablet if advised.    Please advise.  Roslyn HUERTAS RN

## 2024-07-08 ENCOUNTER — MYC MEDICAL ADVICE (OUTPATIENT)
Dept: FAMILY MEDICINE | Facility: CLINIC | Age: 64
End: 2024-07-08
Payer: MEDICARE

## 2024-07-09 NOTE — TELEPHONE ENCOUNTER
Could someone call [facility] and see what is going on?  I thought we had this sorted out several weeks ago.  Copying TC team since Livia was involved, triage and care coordination.     Joel Wegener,MD

## 2024-07-09 NOTE — TELEPHONE ENCOUNTER
Spoke w/ [facility].   [facility] states they have not received the most recent face to face visit and the orders for the wheelchair.   Did fax orders/face to face visit on 6/4/24.   Will refax again today.   Confirmed fax number w/ [facility].   Patient informed of situation via mychart.     Livia CROCKER

## 2024-07-10 ENCOUNTER — MYC MEDICAL ADVICE (OUTPATIENT)
Dept: ORTHOPEDICS | Facility: CLINIC | Age: 64
End: 2024-07-10
Payer: MEDICARE

## 2024-07-11 ENCOUNTER — MEDICAL CORRESPONDENCE (OUTPATIENT)
Dept: HEALTH INFORMATION MANAGEMENT | Facility: CLINIC | Age: 64
End: 2024-07-11
Payer: MEDICARE

## 2024-07-12 ENCOUNTER — VIRTUAL VISIT (OUTPATIENT)
Dept: ORTHOPEDICS | Facility: CLINIC | Age: 64
End: 2024-07-12
Payer: MEDICARE

## 2024-07-12 ENCOUNTER — MYC MEDICAL ADVICE (OUTPATIENT)
Dept: FAMILY MEDICINE | Facility: CLINIC | Age: 64
End: 2024-07-12

## 2024-07-12 DIAGNOSIS — T81.30XA WOUND DEHISCENCE: Primary | ICD-10-CM

## 2024-07-12 PROCEDURE — 99024 POSTOP FOLLOW-UP VISIT: CPT | Mod: 95 | Performed by: ORTHOPAEDIC SURGERY

## 2024-07-12 NOTE — TELEPHONE ENCOUNTER
Clinic staff acknowledges.  ATC was able to connect with patient over the phone to check in for his visit.    Elvis Foster MS, ATC, [facility], [facility]  Dr. Gaudencio Nye

## 2024-07-12 NOTE — LETTER
7/12/2024      Erwin Aguilar  [address]  [place] [address]      Dear Colleague,    Thank you for referring your patient, Erwin Aguilar, to the [facility] [place]. Please see a copy of my visit note below.    Orthopaedic Surgery Clinic Follow-up Appointment    DOS:  05/09/2024, MICK CANDIDA.    I had a video appointment today with this pleasant 63-year-old gentleman today after a right transtibial amputation performed on 05/09/2024.  He is now 9 weeks and 1 day postop.  He reports that in the last several days the amputation wound, which had been healing well, started to open up along the incision line.  He reports having fallen about 3 or 4 times since his surgery.  He denies fevers or chills.  He has several other complaints including that he is still not obtained his wheelchair.  He does have home nursing coming out to visit.  He is currently at home.  He denies any other pain at this time.  Review of his operative report did suggest that the soft tissues at the level of the amputation appeared to be viable.  Review of the pathology report from surgery suggests that the posterior tibial and intertibial arteries were patent without calcifications or stenosis.    Imaging of his right BKA wound in the media portion of the chart dated 07/10/2024 shows what appears to be a deep wound dehiscence along the incision line with exposed subcutaneous tissue and/or muscle.  The images do show healed portions of the incision as well.    Impression is that Mr. Aguilar has a dehiscence of his right below-knee-amputation incision.    I discussed his diagnosis and treatment options, both nonsurgical and surgical, with him.  I would recommend a debridement and irrigation with possible VAC dressing placement.  The potential for multiple additional procedures to control any infection that may be present and to obtain wound healing of the dehisced area was discussed.  The potential for future  "revision of the amputation to a more proximal level to obtain healing was also discussed.  I discussed that would recommend having this done as an admission to facilitate his medical care and optimize his treatment, for example obtaining his VAC machine, obtaining culture results and ID consultation, and facilitating potential multiple additional debridements's if necessary.  Risks, benefits, and alternatives to surgery were discussed.  This very pleasant gentleman verbalized understanding of our discussion and agreement with the plan, and all questions he had this time were answered.    RN attempted to call patient to discuss surgery set up. Patient answered phone \"I need you to call me back\" and hung up on RN    RODERICK Theodore MD  "

## 2024-07-12 NOTE — PROGRESS NOTES
"RN attempted to call patient to discuss surgery set up. Patient answered phone \"I need you to call me back\" and hung up on RN    Melissa Zavaleta RN    "

## 2024-07-12 NOTE — PROGRESS NOTES
Orthopaedic Surgery Clinic Follow-up Appointment    DOS:  05/09/2024, MICK RAGHAVARMIN.    I had a video appointment today with this pleasant 63-year-old gentleman today after a right transtibial amputation performed on 05/09/2024.  He is now 9 weeks and 1 day postop.  He reports that in the last several days the amputation wound, which had been healing well, started to open up along the incision line.  He reports having fallen about 3 or 4 times since his surgery.  He denies fevers or chills.  He has several other complaints including that he is still not obtained his wheelchair.  He does have home nursing coming out to visit.  He is currently at home.  He denies any other pain at this time.  Review of his operative report did suggest that the soft tissues at the level of the amputation appeared to be viable.  Review of the pathology report from surgery suggests that the posterior tibial and intertibial arteries were patent without calcifications or stenosis.    Imaging of his right BKA wound in the media portion of the chart dated 07/10/2024 shows what appears to be a deep wound dehiscence along the incision line with exposed subcutaneous tissue and/or muscle.  The images do show healed portions of the incision as well.    Impression is that Mr. Aguilar has a dehiscence of his right below-knee-amputation incision.    I discussed his diagnosis and treatment options, both nonsurgical and surgical, with him.  I would recommend a debridement and irrigation with possible VAC dressing placement.  The potential for multiple additional procedures to control any infection that may be present and to obtain wound healing of the dehisced area was discussed.  The potential for future revision of the amputation to a more proximal level to obtain healing was also discussed.  I discussed that would recommend having this done as an admission to facilitate his medical care and optimize his treatment, for example obtaining his VAC machine,  obtaining culture results and ID consultation, and facilitating potential multiple additional debridements's if necessary.  Risks, benefits, and alternatives to surgery were discussed.  This very pleasant gentleman verbalized understanding of our discussion and agreement with the plan, and all questions he had this time were answered.

## 2024-07-12 NOTE — TELEPHONE ENCOUNTER
FLORINDA,    Called [facility] at [phone]   Spoke with Saloni.  Did receive fax from earlier this week- not specific enough.  Currently waiting on something from doctor face to face notes and information.  More specific prescription for the actual wheelchair?  States we were supposed to have gotten fax with further request for information.  Do you have this?    Fax number for [facility] is [phone]    Roslyn HUERTAS RN

## 2024-07-12 NOTE — NURSING NOTE
Reason For Visit:   Chief Complaint   Patient presents with    RECHECK     Patient is 9w 1d s/p right BKA.  DOS: 5/9/24.  He is unsure of the purpose of this visit and is overall not happy since surgery.  He still has not obtained his wheelchair.  He does have home nursing.       There were no vitals taken for this visit.         Elvis Foster, ATC

## 2024-07-12 NOTE — TELEPHONE ENCOUNTER
Unfortunately incorrect note attached to last fax.      I updated paperwork and gave to team to fax.     Joel Wegener,MD w

## 2024-07-15 ENCOUNTER — TELEPHONE (OUTPATIENT)
Dept: FAMILY MEDICINE | Facility: CLINIC | Age: 64
End: 2024-07-15
Payer: MEDICARE

## 2024-07-15 NOTE — TELEPHONE ENCOUNTER
FLORINDA,    Home Care is calling regarding an established patient with [facility].    Michell from [facility] calling,  Callback [phone], ok to leave detailed VM    Requesting orders from: Wegener, Joel Daniel Irwin  Provider is following patient: Yes  Is this a 60-day recertification request?  Yes    Orders Requested    Skilled Nursing  Request for recertification   Frequency:  1x/wk for 8 wks    Information was gathered and will be sent to provider for review.    RN will contact Home Care with information after provider review.  Confirmed ok to leave a detailed message with call back.  Contact information confirmed and updated as needed.    Hilario Hallman RN

## 2024-07-15 NOTE — TELEPHONE ENCOUNTER
Forms/Letter Request    Type of form/letter: Physician Order    [id number]  Moving Recert Visit to Monday  7/15/24 Due to scheduling Conflict  SN Effective 7/47/24  [id number]         Do we have the form/letter: Yes:    Who is the form from? [facility] care      Where did/will the form come from? form was faxed in    When is form/letter needed by: asap    How would you like the form/letter returned: Fax : [phone]    Patient Notified form requests are processed in 5-7 business days:No    Could we send this information to you in [employer] or would you prefer to receive a phone call?:   No preference   Okay to leave a detailed message?: No at Other phone number:  N/A

## 2024-07-16 ENCOUNTER — TELEPHONE (OUTPATIENT)
Dept: ORTHOPEDICS | Facility: CLINIC | Age: 64
End: 2024-07-16
Payer: MEDICARE

## 2024-07-16 ENCOUNTER — MEDICAL CORRESPONDENCE (OUTPATIENT)
Dept: HEALTH INFORMATION MANAGEMENT | Facility: CLINIC | Age: 64
End: 2024-07-16
Payer: MEDICARE

## 2024-07-16 ENCOUNTER — TELEPHONE (OUTPATIENT)
Dept: FAMILY MEDICINE | Facility: CLINIC | Age: 64
End: 2024-07-16
Payer: MEDICARE

## 2024-07-16 NOTE — TELEPHONE ENCOUNTER
Spoke w/ [facility].   States they have received the forms.   Will upload them to the portal for the wheelchair team to review.   The wheelchair team will hopefully review them today, latest Thursday ([facility] office is closed tomorrow).  If we need to reach out to the team, we can ask for Rosemarie.   Will await for response from wheelchair team before updating patient.   Did inform [facility] we have been awaiting on a wheelchair for the past two months.     Livia CROCKER

## 2024-07-16 NOTE — TELEPHONE ENCOUNTER
"  Forms/Letter Request    Type of form/letter: OTHER: cushion, gel /foam 18x1 6x2 tension back adjustable 16,18, & 20\" wheel chair back cushion        Do we have the form/letter: Yes: order    Who is the form from?  [facility]  (if other please explain)    Where did/will the form come from? form was faxed in    When is form/letter needed by:  asap     How would you like the form/letter returned: Fax : [phone]    Patient Notified form requests are processed in 5-7 business days:Yes    Could we send this information to you in [employer] or would you prefer to receive a phone call?:   No preference   Okay to leave a detailed message?: No at Other phone number:  [phone]  "

## 2024-07-16 NOTE — TELEPHONE ENCOUNTER
Patient called back  [facility] spoke to patient  States he could receive a 'stock/standard' wheelchair delivered in 2-3 weeks at the earliest  States the copay for this is quite large  [facility] will send patient the information for the stock wheelchair available    But that they do not come to the house to assist with finding the right size/fit wheelchair    Could care coordination assist with other possible wheelchair resources?  Patient concerned this wheelchair will not have any pads/will not work or be affordable  Please advise    Thanks,  Romelia KLEIN RN

## 2024-07-16 NOTE — TELEPHONE ENCOUNTER
Other: Requesting c/b from nurse- was supposed to get a c/b last week regarding his wound that opened up     Could we send this information to you in [employer] or would you prefer to receive a phone call?:   Patient would prefer a phone call   Okay to leave a detailed message?: No at Cell number on file:    Telephone Information:   Mobile [phone]

## 2024-07-16 NOTE — TELEPHONE ENCOUNTER
Forms faxed to [facility] fax # [phone] and copy sent to stat scan.     Livia OLIVEROS  TC

## 2024-07-16 NOTE — TELEPHONE ENCOUNTER
Team,     Please call below wheelchair company to verify they have received order that was faxed 7.12.24  Patient still has not been scheduled for assessment appointment  Please request this be expedited if possible    Thanks,  Romelia KLEIN RN

## 2024-07-17 ENCOUNTER — TELEPHONE (OUTPATIENT)
Dept: ORTHOPEDICS | Facility: CLINIC | Age: 64
End: 2024-07-17
Payer: MEDICARE

## 2024-07-17 ENCOUNTER — HOSPITAL ENCOUNTER (INPATIENT)
Facility: CLINIC | Age: 64
Setting detail: SURGERY ADMIT
End: 2024-07-17
Attending: ORTHOPAEDIC SURGERY | Admitting: ORTHOPAEDIC SURGERY
Payer: MEDICARE

## 2024-07-17 PROBLEM — T81.30XA WOUND DEHISCENCE: Status: ACTIVE | Noted: 2024-07-17

## 2024-07-17 NOTE — TELEPHONE ENCOUNTER
Patient is scheduled for surgery with Dr. Nye    Spoke with: patient    Date of Surgery: 7/24/24    Location: [facility]    Pre op with Provider complete    H&P: Scheduled with PAC 7/23/24    Additional imaging/appointments: N/A    Surgery packet: received     Additional comments: N/A        Betzaida Cheney CMA on 7/17/2024 at 12:52 PM

## 2024-07-17 NOTE — TELEPHONE ENCOUNTER
FUTURE VISIT INFORMATION      SURGERY INFORMATION:  Date: 24  Location: [facility] OR  Surgeon:  Kurtis Nye MD   Anesthesia Type:  choice with block  Procedure: Debridement and irrigation of right Below Knee Amputation wound   Consult: virtual visit 24    RECORDS REQUESTED FROM:       Primary Care Provider: Wegener, Joel Daniel Irwin, MD - [facility]    Pertinent Medical History: hypertension, paroxysmal artrial fibrillation, transient ischemic attack, PAD    Most recent EKG+ Tracin24    Most recent ECHO: 21    Most recent Cardiac Stress Test: 24

## 2024-07-19 ENCOUNTER — MEDICAL CORRESPONDENCE (OUTPATIENT)
Dept: HEALTH INFORMATION MANAGEMENT | Facility: CLINIC | Age: 64
End: 2024-07-19
Payer: MEDICARE

## 2024-07-19 NOTE — TELEPHONE ENCOUNTER
Forms faxed to [facility] fax # [phone] and copy sent to stat scan.     Livia OLIVEROS  TC

## 2024-07-20 ENCOUNTER — MYC REFILL (OUTPATIENT)
Dept: FAMILY MEDICINE | Facility: CLINIC | Age: 64
End: 2024-07-20
Payer: MEDICARE

## 2024-07-20 DIAGNOSIS — G89.4 CHRONIC PAIN SYNDROME: Primary | ICD-10-CM

## 2024-07-20 DIAGNOSIS — M54.17 LUMBOSACRAL RADICULOPATHY: ICD-10-CM

## 2024-07-22 DIAGNOSIS — K70.30 ALCOHOLIC CIRRHOSIS OF LIVER WITHOUT ASCITES (H): Primary | ICD-10-CM

## 2024-07-22 RX ORDER — GABAPENTIN 300 MG/1
CAPSULE ORAL
Qty: 270 CAPSULE | Refills: 3 | Status: SHIPPED | OUTPATIENT
Start: 2024-07-22

## 2024-07-23 ENCOUNTER — PRE VISIT (OUTPATIENT)
Dept: SURGERY | Facility: CLINIC | Age: 64
End: 2024-07-23

## 2024-07-23 ENCOUNTER — VIRTUAL VISIT (OUTPATIENT)
Dept: SURGERY | Facility: CLINIC | Age: 64
End: 2024-07-23
Payer: MEDICARE

## 2024-07-23 VITALS — BODY MASS INDEX: 28.92 KG/M2 | HEIGHT: 70 IN | WEIGHT: 202 LBS

## 2024-07-23 DIAGNOSIS — Z01.818 PREOP EXAMINATION: Primary | ICD-10-CM

## 2024-07-23 DIAGNOSIS — T81.30XA WOUND DEHISCENCE: ICD-10-CM

## 2024-07-23 PROCEDURE — 99215 OFFICE O/P EST HI 40 MIN: CPT | Mod: 24 | Performed by: PHYSICIAN ASSISTANT

## 2024-07-23 ASSESSMENT — ENCOUNTER SYMPTOMS
DYSRHYTHMIAS: 1
SEIZURES: 0
ORTHOPNEA: 0

## 2024-07-23 ASSESSMENT — COPD QUESTIONNAIRES: COPD: 0

## 2024-07-23 ASSESSMENT — LIFESTYLE VARIABLES: TOBACCO_USE: 1

## 2024-07-23 NOTE — H&P
Pre-Operative H & P     CC:  Preoperative exam to assess for increased cardiopulmonary risk while undergoing surgery and anesthesia.    Date of Encounter: 7/23/2024  Primary Care Physician:  Wegener, Joel Daniel Irwin     Reason for visit:   Encounter Diagnoses   Name Primary?    Preop examination Yes    Wound dehiscence        HPI  Erwin Aguilar is a 63 year old male who presents for pre-operative H & P in preparation for  Procedure Information       Case: [id number] Date/Time: 07/24/24 1000    Procedure: Debridement and irrigation of right Below Knee Amputation wound (Right: Leg)    Anesthesia type: Choice with Block    Diagnosis: Wound dehiscence [T81.30XA]    Pre-op diagnosis: Wound dehiscence [T81.30XA]    [facility]    Providers: Kurtis Nye MD            Mr. Aguilar has a PMH significant for hypertension, hyperlipidemia, paroxysmal atrial fibrillation, asthma, chronic sinusitis, osteoarthritis, type 2 diabetes, GERD, cirrhosis, gastroparesis, anxiety, depression, history of alcohol abuse (in remission x 10 years), history of cauda equina syndrome, and chronic pain. He is s/p right superficial femoral artery stent placement on 4/23/24 and right BKA 5/9/24. Recently the amputation wound opened up along the incision line. He denies fevers or chills.    Per chart review: Imaging of his right BKA wound in the media portion of the chart dated 07/10/2024 shows what appears to be a deep wound dehiscence along the incision line with exposed subcutaneous tissue and/or muscle.  The images do show healed portions of the incision as well. The patient had a virtual visit with Dr. Nye on 7/12/24 and the above surgery is now planned.    The patient states he has been unable to leave his home because he does not have a proper wheelchair. He is unsure he wants to proceed tomorrow. Message sent to surgeon and RNCC.    History is obtained from the patient and chart review    Hx of abnormal bleeding or  anti-platelet use: Eliquis, ASA 81      Past Medical History  Past Medical History:   Diagnosis Date    Actinic keratosis     aldara    Anxiety     Cancer (H)     squamous cell skin CA    Cauda equina spinal cord injury (H)     Chronic sinusitis 5-1-16    Depressive disorder     Diastasis recti     Esophageal reflux     Esophageal varices in cirrhosis (H) 4/1/2014    Hospitalized for UGI blee 3/28/14, endoscopy revealed bleeding varices.    Essential hypertension, benign     Intermittent asthma     Mild depression     Mixed hyperlipidemia     Nasal polyps 5-1-16    Osteoarthritis of lumbar spine, unspecified spinal osteoarthritis complication status     Other chronic pain     Paroxysmal atrial fibrillation (H) 9/22/2021    SCCA (squamous cell carcinoma) of skin     Seasonal allergic conjunctivitis     Type II or unspecified type diabetes mellitus without mention of complication, not stated as uncontrolled     Unspecified site of spinal cord injury without evidence of spinal bone injury     due to back surgery       Past Surgical History  Past Surgical History:   Procedure Laterality Date    ABDOMEN SURGERY  2014    AMPUTATE LEG BELOW KNEE Right 5/9/2024    Procedure: Right below knee amputation (transtibial amputation);  Surgeon: Kurtis Nye MD;  [facility]    ANGIOGRAM Right 4/23/2024    Procedure: Ultrasound guided percutaneous transarterial left common femoral artery access, diagnostic aortoiliac angiogram, selective cannulation of right comon iliac, righ external iliac, right common femoral, and right superficial femoral artery, balloon angioplasty of right superficial femoral artery, 6mm x 12 cm self-expanding drug-coated stent placement of right superficial femoral artery, lef    BACK SURGERY  August 2009    COLONOSCOPY N/A 5/12/2016    Procedure: COMBINED COLONOSCOPY, SINGLE OR MULTIPLE BIOPSY/POLYPECTOMY BY BIOPSY;  Surgeon: Ana Paula Urbina MD;  [facility]    DECOMPRESSION  CUBITAL TUNNEL Left 8/31/2023    Procedure: LEFT CUBITAL TUNNEL RELEASE,;  Surgeon: Deny Dixon MD;  Location: [facility]    ENDOSCOPY UPPER, COLONOSCOPY, COMBINED  10/19/2011    Procedure:COMBINED ENDOSCOPY UPPER, COLONOSCOPY; Upper Endoscopy, Colonoscopy with Polypectomy x4; Surgeon:AMBAR RODRÍGUEZ; [facility]    ENT SURGERY  1-2016    Ongoing since then    ESOPHAGOSCOPY, GASTROSCOPY, DUODENOSCOPY (EGD), COMBINED  3/28/2014    Procedure: COMBINED ESOPHAGOSCOPY, GASTROSCOPY, DUODENOSCOPY (EGD);  EGD, Gastric Biopsies, Esophageal Banding;  Surgeon: Reyna Tovar MD;  [facility]    ESOPHAGOSCOPY, GASTROSCOPY, DUODENOSCOPY (EGD), COMBINED  6/9/2014    Procedure: COMBINED ESOPHAGOSCOPY, GASTROSCOPY, DUODENOSCOPY (EGD);  Surgeon: Curtis Mendez MD;  [facility]    ESOPHAGOSCOPY, GASTROSCOPY, DUODENOSCOPY (EGD), COMBINED  7/24/2014    Procedure: COMBINED ESOPHAGOSCOPY, GASTROSCOPY, DUODENOSCOPY (EGD);  Surgeon: Gerard Samaniego MD;  [facility]    ESOPHAGOSCOPY, GASTROSCOPY, DUODENOSCOPY (EGD), COMBINED N/A 10/31/2014    Procedure: COMBINED ESOPHAGOSCOPY, GASTROSCOPY, DUODENOSCOPY (EGD);  Surgeon: Gerard Samaniego MD;  [facility]    ESOPHAGOSCOPY, GASTROSCOPY, DUODENOSCOPY (EGD), COMBINED N/A 5/12/2016    Procedure: COMBINED ESOPHAGOSCOPY, GASTROSCOPY, DUODENOSCOPY (EGD);  Surgeon: Ana Paula Urbina MD;  [facility]    ESOPHAGOSCOPY, GASTROSCOPY, DUODENOSCOPY (EGD), COMBINED N/A 8/2/2018    Procedure: COMBINED ESOPHAGOSCOPY, GASTROSCOPY, DUODENOSCOPY (EGD);  EGD;  Surgeon: Yu Wagner MD;  [facility]    HCL SQUAMOUS CELL CARCINOMA AG  10/07    left forearm    HERNIORRHAPHY UMBILICAL  11/8/2012    Procedure: HERNIORRHAPHY UMBILICAL;  Open Umbilical Hernia Repair With Mesh ;  Surgeon: Chase Nicholson MD;  [facility]    INSERT STIMULATOR DORSAL COLUMN N/A 6/28/2018    Procedure: INSERT STIMULATOR DORSAL COLUMN;;  Surgeon: Sohail  Elvis Ramos MD;  [facility]    IR LOWER EXTREMITY ANGIOGRAM RIGHT  4/23/2024    neuro stimulator  2010    RELEASE CARPAL TUNNEL Left 8/31/2023    Procedure: LEFT OPEN CARPAL TUNNEL RELEASE,;  Surgeon: Deny Dixon MD;  Location: [facility]    RELEASE TRIGGER FINGER Left 8/31/2023    Procedure: Release left trigger finger thumb;  Surgeon: Deny Dixon MD;  Location: [facility]    REMOVE GENERATOR STIMULATOR (LOCATION) N/A 6/28/2018    Procedure: REMOVE GENERATOR STIMULATOR (LOCATION);  Spinal Cord Stimulator and IPG Explant and Re-Implant of SCS System (Leads and IPG);  Surgeon: Elvis Sauceda MD;  [facility]    REPAIR TENDON ACHILLES Right 11/11/2020    Procedure: Right achilles debridement and partial calcaneus excision;  Surgeon: Eh Sandoval MD;  Location: [facility]    SURGICAL HISTORY OF -   1/02    abcess tooth    SURGICAL HISTORY OF -   1999    L4-5 laminectomy, cauda equina syndrome    SURGICAL HISTORY OF -   +    herniated disk repair    TONSILLECTOMY  10 1964    TRANSPOSITION ULNAR NERVE (ELBOW)      right    ZZC APPENDECTOMY  1974       Prior to Admission Medications  Current Outpatient Medications   Medication Sig Dispense Refill    albuterol (PROAIR HFA/PROVENTIL HFA/VENTOLIN HFA) 108 (90 Base) MCG/ACT inhaler INHALE 2 PUFFS BY MOUTH EVERY 6 HOURS AS NEEDED FOR SHORTNESS OF BREATH OR WHEEZING (Patient taking differently: 2 puffs every 6 hours as needed INHALE 2 PUFFS BY MOUTH EVERY 6 HOURS AS NEEDED FOR SHORTNESS OF BREATH OR WHEEZING) 18 g 1    baclofen (LIORESAL) 10 MG tablet TAKE 2 TABLETS BY MOUTH 3 TIMES A  tablet 3    insulin glargine (LANTUS SOLOSTAR) 100 UNIT/ML pen INJECT 26 UNITS SUBCUTANEOUSLY AT BEDTIME (Patient taking differently: 26 Units at bedtime INJECT 26 UNITS SUBCUTANEOUSLY AT BEDTIME) 15 mL 1    oxyCODONE IR (ROXICODONE) 10 MG tablet Take 1 tablet (10 mg) by mouth 3 times daily as needed for severe pain 90 tablet 0    promethazine  (PHENERGAN) 25 MG tablet TAKE 1 TABLET BY MOUTH 3 TIMES A DAY AS NEEDED FOR NAUSEA (Patient taking differently: Take by mouth every 6 hours as needed TAKE 1 TABLET BY MOUTH 3 TIMES A DAY AS NEEDED FOR NAUSEA) 270 tablet 0    propranolol (INDERAL) 40 MG tablet Take 1 tablet (40 mg) by mouth 3 times daily 270 tablet 2    rosuvastatin (CRESTOR) 40 MG tablet Take 1 tablet (40 mg) by mouth daily 90 tablet 3    sertraline (ZOLOFT) 100 MG tablet TAKE 2 TABLETS BY MOUTH DAILY (Patient taking differently: TAKE 2 TABLETS BY MOUTH DAILY) 180 tablet 3    triamterene-HCTZ (DYAZIDE) 37.5-25 MG capsule Take 1 capsule by mouth every morning 90 capsule 1    apixaban ANTICOAGULANT (ELIQUIS ANTICOAGULANT) 5 MG tablet Take 1 tablet (5 mg) by mouth 2 times daily (Patient not taking: Reported on 7/23/2024) 180 tablet 3    aspirin 81 MG EC tablet Take 1 tablet (81 mg) by mouth daily (Patient not taking: Reported on 7/23/2024) 84 tablet 0    Continuous Blood Gluc  (FREESTYLE CACHORRO 2 READER) GASTON USE TO READ BLOOD SUGARS AS PER 'S INSTRUCTIONS (Patient not taking: Reported on 6/4/2024) 1 each 0    doxazosin (CARDURA) 8 MG tablet Take 1 tablet (8 mg) by mouth at bedtime 90 tablet 1    gabapentin (NEURONTIN) 300 MG capsule Take 600 mg by mouth in the afternoon and 300 mg at bedtime (Patient not taking: Reported on 7/23/2024) 270 capsule 3    hydrOXYzine HCl (ATARAX) 25 MG tablet Take 1 tablet (25 mg) by mouth every 6 hours as needed for other (adjuvant pain) (Patient not taking: Reported on 6/4/2024) 30 tablet 0    insulin pen needle (GLOBAL EASE INJECT PEN NEEDLES) 32G X 4 MM miscellaneous USE AS DIRECTED EVERY  each 1    Lidocaine (LIDOCARE) 4 % Patch Place 1 patch onto the skin every 24 hours To prevent lidocaine toxicity, patient should be patch free for 12 hrs daily. (Patient not taking: Reported on 6/4/2024) 10 patch 0    lidocaine (LMX4) 4 % external cream Apply topically once as needed for mild pain (Patient  not taking: Reported on 2024) 28 g 0    medical cannabis (Patient's own supply) See Admin Instructions (The purpose of this order is to document that the patient reports taking medical cannabis.  This is not a prescription, and is not used to certify that the patient has a qualifying medical condition.)   Flower - PRN      oxyCODONE (ROXICODONE) 5 MG tablet Take 1 tablet (5 mg) by mouth 5 times daily As needed for post op pain in addition to 10mg three times daily scheduled. (Patient taking differently: Take 5 mg by mouth 5 times daily As needed for post op pain in addition to 10mg three times daily scheduled.) 30 tablet 0       Allergies  Allergies   Allergen Reactions    Levaquin Anaphylaxis and Rash     Swelling in lip and tongue felt thick    Lisinopril Anaphylaxis     Swollen tongue; inability to swallow; drooling; hives; swollen face, neck, angioedema    Acetaminophen      Hx of cirrhosis     Amlodipine      Swelling, hives, possible angioedema      Morphine      b/p dropped and arms went numb  Hypotension    Quinolones Hives    Spironolactone Unknown     Pt believes himself to be allergic to it; cannot find his old records of this.-mjc     Bactrim [Sulfamethoxazole-Trimethoprim] Rash       Social History  Social History     Socioeconomic History    Marital status: Single     Spouse name: Not on file    Number of children: 0    Years of education: Not on file    Highest education level: Not on file   Occupational History    Occupation: sales     Employer: UNEMPLOYED   Tobacco Use    Smoking status: Former     Current packs/day: 0.00     Types: Cigarettes     Start date: 10/13/1983     Quit date: 1984     Years since quittin.1     Passive exposure: Past    Smokeless tobacco: Never   Vaping Use    Vaping status: Every Day    Substances: THC, CBD    Devices: Refillable tank   Substance and Sexual Activity    Alcohol use: Not Currently     Comment: - last drink was 3/28/14    Drug use: Yes      Frequency: 2.0 times per week     Types: Marijuana     Comment: medical marijuana - vape daily    Sexual activity: Not Currently     Partners: Female     Birth control/protection: Abstinence   Other Topics Concern    Parent/sibling w/ CABG, MI or angioplasty before 65F 55M? No   Social History Narrative    Not on file     Social Determinants of Health     Financial Resource Strain: Low Risk  (1/9/2024)    Financial Resource Strain     Within the past 12 months, have you or your family members you live with been unable to get utilities (heat, electricity) when it was really needed?: No   Food Insecurity: Low Risk  (1/9/2024)    Food Insecurity     Within the past 12 months, did you worry that your food would run out before you got money to buy more?: No     Within the past 12 months, did the food you bought just not last and you didn t have money to get more?: No   Transportation Needs: High Risk (1/9/2024)    Transportation Needs     Within the past 12 months, has lack of transportation kept you from medical appointments, getting your medicines, non-medical meetings or appointments, work, or from getting things that you need?: Yes   Physical Activity: Inactive (12/31/2020)    Exercise Vital Sign     Days of Exercise per Week: 0 days     Minutes of Exercise per Session: 0 min   Stress: Not on file   Social Connections: Unknown (12/31/2020)    Social Connection and Isolation Panel [NHANES]     Frequency of Communication with Friends and Family: Not on file     Frequency of Social Gatherings with Friends and Family: Not on file     Attends Yazdanism Services: Not on file     Active Member of Clubs or Organizations: Not on file     Attends Club or Organization Meetings: Not on file     Marital Status:    Interpersonal Safety: Low Risk  (1/9/2024)    Interpersonal Safety     Do you feel physically and emotionally safe where you currently live?: Yes     Within the past 12 months, have you been hit, slapped, kicked  or otherwise physically hurt by someone?: No     Within the past 12 months, have you been humiliated or emotionally abused in other ways by your partner or ex-partner?: No   Housing Stability: Low Risk  (1/9/2024)    Housing Stability     Do you have housing? : Yes     Are you worried about losing your housing?: No       Family History  Family History   Problem Relation Age of Onset    Cancer Mother         lung    Breast Cancer Mother     Other Cancer Mother     Cancer Father         esophogeal, GERD    Snoring Father     Diabetes Brother         amputation, Type 1    Diabetes Brother     Diabetes Brother     Cancer - colorectal No family hx of     Glaucoma No family hx of     Macular Degeneration No family hx of     Anesthesia Reaction No family hx of     Venous thrombosis No family hx of        Review of Systems  The complete review of systems is negative other than noted in the HPI or here.     Anesthesia Evaluation   Pt has had prior anesthetic.     No history of anesthetic complications       ROS/MED HX  ENT/Pulmonary: Comment: Chronic sinusitis    (+)     ALISIA risk factors,  hypertension,         tobacco use, Past use,    Intermittent, asthma (related to seasonal allergies)  Treatment: Inhaler prn,              (-) COPD and recent URI   Neurologic: Comment: History of cauda equina    (+)                     Spinal cord injury (surgical injury), year sustained: 2009,       (-) no seizures and no CVA   Cardiovascular:     (+) Dyslipidemia hypertension- -   -  - -   Taking blood thinners                     dysrhythmias, a-fib,        Previous cardiac testing   Echo: Date: 9/7/2021 Results:  Interpretation Summary   Global and regional left ventricular function is normal with an EF of 55-60%.   Right ventricular function, chamber size, wall motion, and thickness are normal.   No hemodynamcially signifcant valvular abnormalities.   Estimated mean RA pressure is mildly elevated at 8 mmHg.   No pericardial effusion  is present.   There is no prior study for direct comparison.  Stress Test:  Date: Results:    ECG Reviewed:  Date: 10/25/2021 Results:  Holter  Conclusion: A tendency toward sinus bradycardia was noted throughout the monitoring time with average resting heart rate approximately 45 bpm and average heart rate less than 65 bpm which could indicate some chronotropic incompetence in an active patient.  These findings could be consistent with sinus node dysfunction.  No atrial fibrillation was noted.   Cath:  Date: Results:   (-) CAD, CHAVEZ, orthopnea/PND and irregular heartbeat/palpitations   METS/Exercise Tolerance: >4 METS Comment: <4  Pushing himself in a manual wheelchair  Denies cardiac symptoms such as chest pain/pressure, dizziness, CHAVEZ, orthopnea.       Hematologic:     (+)      anemia, history of blood transfusion, no previous transfusion reaction, Known PRBC Anitbodies:No    (-) history of blood clots   Musculoskeletal: Comment: S/p right BKA 5/2024  (+)  arthritis,             GI/Hepatic: Comment: Gastroparesis  Chronic nausea on phenergan    (+)   esophageal disease, Varices,         liver disease (Cirrhosis),    (-) GERD   Renal/Genitourinary:  - neg Renal ROS     Endo:     (+)  type II DM, Last HgA1c: 5.9, date: 5/3/24, Using insulin, - not using insulin pump.  not previously admitted for DM/DKA.           (-) thyroid disease and chronic steroid usage   Psychiatric/Substance Use:     (+) psychiatric history anxiety and depression alcohol abuse (sober since 2014)  Recreational drug usage: Cannabis.    Infectious Disease:  - neg infectious disease ROS     Malignancy:   (+) Malignancy, History of Skin.Skin CA Remission status post Surgery.      Other:  - neg other ROS    (+)  , H/O Chronic Pain,         Virtual visit -  No vitals were obtained    Physical Exam  Constitutional: Awake, alert, cooperative, no apparent distress, and appears stated age.  HENT: Normocephalic  Respiratory: non labored breathing    Neurologic: Awake, alert, oriented to name, place and time.   Neuropsychiatric: Calm, cooperative. Normal affect.      Prior Labs/Diagnostic Studies   All labs and imaging personally reviewed     Component      Latest Ref Rng 5/10/2024  7:44 AM   WBC      4.0 - 11.0 10e3/uL 7.7    RBC Count      4.40 - 5.90 10e6/uL 4.17 (L)    Hemoglobin      13.3 - 17.7 g/dL 12.9 (L)    Hematocrit      40.0 - 53.0 % 36.6 (L)    MCV      78 - 100 fL 88    MCH      26.5 - 33.0 pg 30.9    MCHC      31.5 - 36.5 g/dL 35.2    RDW      10.0 - 15.0 % 12.5    Platelet Count      150 - 450 10e3/uL 157       Legend:  (L) Low    Component      Latest Ref Rng 5/10/2024  7:44 AM   Sodium      135 - 145 mmol/L 139    Potassium      3.4 - 5.3 mmol/L 3.5    Carbon Dioxide (CO2)      22 - 29 mmol/L 27    Anion Gap      7 - 15 mmol/L 9    Urea Nitrogen      8.0 - 23.0 mg/dL 8.1    Creatinine      0.67 - 1.17 mg/dL 0.71    GFR Estimate      >60 mL/min/1.73m2 >90    Calcium      8.8 - 10.2 mg/dL 8.8    Chloride      98 - 107 mmol/L 103    Glucose      70 - 99 mg/dL 92    Alkaline Phosphatase      40 - 150 U/L 106    AST      0 - 45 U/L 32    ALT      0 - 70 U/L 18    Protein Total      6.4 - 8.3 g/dL 7.0    Albumin      3.5 - 5.2 g/dL 4.2    Bilirubin Total      <=1.2 mg/dL 0.7          EKG/ stress test - if available please see in ROS above   Echo result w/o MOPS: Interpretation SummaryGlobal and regional left ventricular function is normal with an EF of 55-60%.Right ventricular function, chamber size, wall motion, and thickness arenormal.No hemodynamcially signifcant valvular abnormalities.Estimated mean RA pressure is mildly elevated at 8 mmHg.No pericardial effusion is present. There is no prior study for direct comparison.    The patient's records and results personally reviewed by this provider.     Outside records reviewed from: Care Everywhere      Assessment    Erwin Aguilar is a 63 year old male seen as a PAC referral for risk assessment  and optimization for anesthesia.    Plan/Recommendations  Pt will be optimized for the proposed procedure.  See below for details on the assessment, risk, and preoperative recommendations    NEUROLOGY  - No history of TIA, CVA or seizure  - History of intra-operative spinal cord injury 2009 complicated by cauda equina syndrome. [employer] spinal cord stimulator in place. Patient is not currently using spinal cord stimulator.   - Chronic Pain  On chronic opiates, morphine equivilant =  MME/Day   Please access the PAC pharmacist's note for full details.  -Post Op delirium risk factors:  High co-morbid index    ENT  - No current airway concerns.  Will need to be reassessed day of surgery.  Mallampati: Unable to assess  TM: Unable to assess  - full dentures    CARDIAC  - No history of CAD  - Paroxysmal Afib  Anticoagulated on Eliquis: last dose taken was Sunday evening, 7/21  - HTN, Patient has a history of allergy to multiple antihypertensives and has had difficulty controlling his BP in the past. Last BP reading in chart noted 126/68  -  HLD    - METS (Metabolic Equivalents)  Patient performs 4 or more METS exercise without symptoms             Total Score: 0      RCRI-Low risk: Class 2 0.9% complication rate             Total Score: 1    RCRI: Diabetes        PULMONARY    ALISIA Medium Risk             Total Score: 3    ALISIA: Hypertension    ALISIA: Over 50 ys old    ALISIA: Male      - Asthma  Well controlled    - Tobacco History    History   Smoking Status    Former    Types: Cigarettes   Smokeless Tobacco    Never       GI  - GERD  History of GERD, resolved when patient stopped drinking alcohol almost 10 years ago.   - History of alcohol abuse/cirrhosis, sober since 2014. Most recent LFTs within normal limits. Follows with GI annually.   - History of esophageal varices with GI bleed. No recurrence. Last EGD 2018 with normal esophagus. Planning for repeat EGD.  - Chronic nausea on phenergan    PONV Medium Risk  Total Score: 2  "          1 AN PONV: Patient is not a current smoker    1 AN PONV: Intended Post Op Opioids        /RENAL  - Baseline Creatinine  0.71    ENDOCRINE    - BMI: Estimated body mass index is 28.98 kg/m  as calculated from the following:    Height as of this encounter: 1.778 m (5' 10\").    Weight as of this encounter: 91.6 kg (202 lb).  Overweight (BMI 25.0-29.9)  - Diabetes  Hemoglobin A1C (%)   Date Value   05/03/2024 5.9 (H)   04/09/2021 6.0 (H)     Diabetes Type 2, insulin dependent. Hold morning oral hypoglycemic medications and short acting insulin DOS. Take 80% of last scheduled dose of long-acting insulin prior to procedure.  Recommend close monitoring of the patient's blood glucose levels throughout the perioperative period.    HEME  VTE Low Risk 0.5%             Total Score: 3    VTE: Greater than 59 yrs old    VTE: Male      - PAD s/p right superficial femoral artery stent placement on 4/23/24    - Coagulopathy second to Apixaban (Eliquis), last dose taken 7/21, PM  - Platelet disfunction second to Aspirin (Jade, many others), last dose taken   - h/o anemia, hgb 12.9 (5/10/24)  - a type and screen has not been ordered      MSK  - h/o right BKA 5/9/24    PSYCH  - h/o anxiety and depression, continue mental health medications    Different anesthesia methods/types have been discussed with the patient, but they are aware that the final plan will be decided by the assigned anesthesia provider on the date of service.      The patient is optimized for their procedure. AVS with information on surgery time/arrival time, meds and NPO status given by nursing staff. No further diagnostic testing indicated.    Please refer to the physical examination documented by the anesthesiologist in the anesthesia record on the day of surgery.    Video-Visit Details    Type of service:  Video Visit    Provider received verbal consent for a Video Visit from the patient? Yes   Video Call Length: 22 minutes    Originating Location " (pt. Location): Home    Distant Location (provider location):  Off-site  Mode of Communication:  Video Conference via [employer]  On the day of service:     Prep time: 14 minutes  Visit time: 22 minutes  Documentation time: 11 minutes  ------------------------------------------  Total time: 47 minutes      Betty Zhao PA-C  Preoperative Assessment Center  [facility]  Phone: [phone]  Fax: [phone]

## 2024-07-23 NOTE — PATIENT INSTRUCTIONS
Preparing for Your Surgery      Name:  Erwin Aguilar   MRN:  [id number]   :  1960   Today's Date:  2024       Arriving for surgery:  Surgery date:  24  Arrival time:  7:30 am    Please come to:         [facility] [address]       (Address takes you to the [facility].)  [address] [place], [place] [address]. [place]  [address]     The Green Ramp for patients and visitors is beneath the [facility]. The parking facility entrance is at the intersection of [address] [place] [address] and [address]. Patients and visitors who self-park will receive the reduced hospital parking rate (no ticket validation needed).   [place] parking, located at the [facility] main entrance on [address] [place] [address], is available Monday - Friday from 7 am to 3:30 pm.   Discounted parking pass options can be purchased from  attendants during business hours.     -Check in at the security desk in the [facility] ([facility])   Lobby. They will direct you to the correct elevators.   -Proceed to the 3rd floor, Adult Surgery Waiting Lounge. [phone]     If you need directions, a wheelchair or escort please stop at the Information Desk in the lobby.  Inform the information person you are here for surgery; a wheelchair and escort to [address] will be provided.   An escort to the Adult Surgery Waiting Lounge will be provided. .    What can I eat or drink?  -  You may eat and drink normally up to 8 hours prior to arrival time. (Until 11:30 pm 24)  -  You may have clear liquids until 2 hours prior to arrival time. (Until 5:30 am)    Examples of clear liquids:  Water  Clear broth  Juices (apple, white grape, white cranberry  and cider) without pulp  Noncarbonated, powder based beverages  (lemonade and Ronnell-Aid)  Sodas (Sprite, 7-Up, ginger ale and seltzer)  Coffee or tea (without milk or  cream)  Gatorade    -  No Alcohol or cannabis products for at least 24 hours before surgery.     Which medicines can I take?  Hold Multivitamins for 7 days before surgery.  Hold Supplements for 7 days before surgery.  Hold Ibuprofen (Advil, Motrin) for 1 day before surgery--unless otherwise directed by surgeon.  Hold Naproxen (Aleve) for 4 days before surgery.    Reports not currently taking Apixaban (Eliquis). Last dose 7-21-24.    Reports not currently taking Aspirin. Last dose 7-21-24.    The bedtime prior to surgery (7-23-24), decrease the Glargine (Lantus) insulin to 21 units.    -  DO NOT take these medications the day of surgery:  Triamterene-Hydrochlorothiazide (Dyazide)  Promethazine (Phenergan)    -  PLEASE TAKE these medications the day of surgery:  Baclofen (Lioresal)  Propranolol (INderal)  Rosuvastatin (Crestor)  Sertraline (Zoloft)  Albuterol inhaler if needed  Oxycodone (Roxicodone) if needed  Oxycodone IR (Roxicodone) if needed    Bring Albuterol inhaler to hospital.    How do I prepare myself?  - Please take 2 showers (one the night prior to surgery and one the morning of surgery) using Scrubcare or Hibiclens soap.    Use this soap only from the neck to your toes.     Leave the soap on your skin for one minute--then rinse thoroughly.      You may use your own shampoo and conditioner. No other hair products.   - Please remove all jewelry and body piercings.  - No lotions, deodorants or fragrance.  - No makeup or fingernail polish.   - Bring your ID and insurance card.    -If you use a CPAP machine, please bring the CPAP machine, tubing, and mask to hospital.    -If you have a Deep Brain Stimulator, Spinal Cord Stimulator, or any Neuro Stimulator device---you must bring the remote control to the hospital.      ALL PATIENTS GOING HOME THE SAME DAY OF SURGERY ARE REQUIRED TO HAVE A RESPONSIBLE ADULT TO DRIVE AND BE IN ATTENDANCE WITH THEM FOR 24 HOURS FOLLOWING SURGERY.    Covid testing policy as of  12/06/2022  Your surgeon will notify and schedule you for a COVID test if one is needed before surgery--please direct any questions or COVID symptoms to your surgeon      Questions or Concerns:    - For any questions regarding the day of surgery or your hospital stay, please contact the Pre Admission Nursing Office at [phone].       - If you have health changes between today and your surgery, please call your surgeon.       - For questions after surgery, please call your surgeons office.           Current Visitor Guidelines    You may have 2 visitors in the pre op area.    Visiting hours: 8 a.m. to 8:30 p.m.    Patients confirmed or suspected to have symptoms of COVID 19 or flu:     No visitors allowed for adult patients.   Children (under age 18) can have 1 named visitor.     People who are sick or showing symptoms of COVID 19 or flu:    Are not allowed to visit patients--we can only make exceptions in special situations.       Please follow these guidelines for your visit:          Please maintain social distance          Masking is optional--however at times you may be asked to wear a mask for the safety of yourself and others     Clean your hands with alcohol hand . Do this when you arrive at and leave the building and patient room,    And again after you touch your mask or anything in the room.     Go directly to and from the room you are visiting.     Stay in the patient s room during your visit. Limit going to other places in the hospital as much as possible     Leave bags and jackets at home or in the car.     For everyone s health, please don t come and go during your visit. That includes for smoking   during your visit.

## 2024-07-23 NOTE — PROGRESS NOTES
Erwin is a 63 year old who is being evaluated via a billable video visit.    Yumiko Salvador   Erwin is a 63 year old, presenting for the following health issues:  Pre-Op Exam (/)            UCHE Calderon LPN

## 2024-07-24 ENCOUNTER — VIRTUAL VISIT (OUTPATIENT)
Dept: FAMILY MEDICINE | Facility: CLINIC | Age: 64
End: 2024-07-24
Payer: MEDICARE

## 2024-07-24 ENCOUNTER — MYC MEDICAL ADVICE (OUTPATIENT)
Dept: FAMILY MEDICINE | Facility: CLINIC | Age: 64
End: 2024-07-24

## 2024-07-24 ENCOUNTER — TELEPHONE (OUTPATIENT)
Dept: ORTHOPEDICS | Facility: CLINIC | Age: 64
End: 2024-07-24

## 2024-07-24 DIAGNOSIS — Z89.511 S/P BELOW KNEE AMPUTATION, RIGHT (H): Primary | ICD-10-CM

## 2024-07-24 DIAGNOSIS — T14.8XXA OPEN WOUND: ICD-10-CM

## 2024-07-24 PROCEDURE — G2211 COMPLEX E/M VISIT ADD ON: HCPCS | Mod: 95 | Performed by: FAMILY MEDICINE

## 2024-07-24 PROCEDURE — 99213 OFFICE O/P EST LOW 20 MIN: CPT | Mod: 95 | Performed by: FAMILY MEDICINE

## 2024-07-24 ASSESSMENT — ASTHMA QUESTIONNAIRES
QUESTION_4 LAST FOUR WEEKS HOW OFTEN HAVE YOU USED YOUR RESCUE INHALER OR NEBULIZER MEDICATION (SUCH AS ALBUTEROL): NOT AT ALL
QUESTION_3 LAST FOUR WEEKS HOW OFTEN DID YOUR ASTHMA SYMPTOMS (WHEEZING, COUGHING, SHORTNESS OF BREATH, CHEST TIGHTNESS OR PAIN) WAKE YOU UP AT NIGHT OR EARLIER THAN USUAL IN THE MORNING: NOT AT ALL
QUESTION_1 LAST FOUR WEEKS HOW MUCH OF THE TIME DID YOUR ASTHMA KEEP YOU FROM GETTING AS MUCH DONE AT WORK, SCHOOL OR AT HOME: NONE OF THE TIME
ACT_TOTALSCORE: 25
ACT_TOTALSCORE: 25
QUESTION_5 LAST FOUR WEEKS HOW WOULD YOU RATE YOUR ASTHMA CONTROL: COMPLETELY CONTROLLED
QUESTION_2 LAST FOUR WEEKS HOW OFTEN HAVE YOU HAD SHORTNESS OF BREATH: NOT AT ALL

## 2024-07-24 NOTE — TELEPHONE ENCOUNTER
Patient left a message on surgery scheduling line at 8:41am. Patient's message stated they have extreme nausea and are not able to take antinausea medication due to having surgery. Patient is unsure if they will still be able to have surgery today or not. Patient's message also stated that the patient has called and sent messages, but has not heard back from anyone yet.    It appears that the patient did not show up for surgery today.    Betzaida  Perioperative   [phone]

## 2024-07-24 NOTE — PROGRESS NOTES
"Erwin is a 63 year old who is being evaluated via a billable video visit.    How would you like to obtain your AVS? MyChart  If the video visit is dropped, the invitation should be resent by:   Will anyone else be joining your video visit? No      Assessment & Plan     S/P below knee amputation, right (H):    Needing appropriate wheelchair for more extended time period due to prolonged wound healing (see below).  Has learned about seated and wheeled mobility clinic from a friend familiar with erikw and also requesting a physiatry (physical medicine) referral in addition to this to help assist. (See document sent via [employer] today.      Will place orders as above.     Open wound  Overall continues to improve after knee amputation.  Wound healed now aside from lateral corner of incision which has a quarter size \"tear drop\" shaped area.  By video has granulation tissue which some scab around edges but not tunnels or deep pockets.  Describes serous drainage but less than in the past.  Had plans to do operative debridement and potentially even bone biopsy for osteomyelitis per his description although prefers to work with wound clinic first for possible wound vac which he has used in past with success.  I do feel this is reasonable based on the fact that he continues to feel it is improving albeit slowly.     Placed order for wound consult to manage and treat.       The longitudinal plan of care for the diagnosis(es)/condition(s) as documented were addressed during this visit. Due to the added complexity in care, I will continue to support Erwin in the subsequent management and with ongoing continuity of care.      BMI  Estimated body mass index is 28.98 kg/m  as calculated from the following:    Height as of 7/23/24: 1.778 m (5' 10\").    Weight as of 7/23/24: 91.6 kg (202 lb).             Subjective   Erwin is a 63 year old, presenting for the following health issues:  No chief complaint on file.      Video Start " Time:  1:07    History of Present Illness       Reason for visit:  Wheelchair wiund care    He eats 2-3 servings of fruits and vegetables daily.He consumes 1 sweetened beverage(s) daily.He exercises with enough effort to increase his heart rate 10 to 19 minutes per day.  He exercises with enough effort to increase his heart rate 5 days per week.   He is taking medications regularly.                   Objective           Vitals:  No vitals were obtained today due to virtual visit.    Physical Exam   GENERAL: alert and no distress  EYES: Eyes grossly normal to inspection.  No discharge or erythema, or obvious scleral/conjunctival abnormalities.  RESP: No audible wheeze, cough, or visible cyanosis.    SKIN: Visible skin clear. No significant rash, abnormal pigmentation or lesions.  NEURO: Cranial nerves grossly intact.  Mentation and speech appropriate for age.  PSYCH: Appropriate affect, tone, and pace of words          Video-Visit Details    Type of service:  Video Visit   Video End Time:1:26 PM  Originating Location (pt. Location): Home    Distant Location (provider location):  On-site  Platform used for Video Visit: Kameron  Signed Electronically by: Joel Daniel Wegener, MD

## 2024-07-25 ENCOUNTER — MYC MEDICAL ADVICE (OUTPATIENT)
Dept: FAMILY MEDICINE | Facility: CLINIC | Age: 64
End: 2024-07-25
Payer: MEDICARE

## 2024-07-25 DIAGNOSIS — G89.4 CHRONIC PAIN SYNDROME: ICD-10-CM

## 2024-07-25 DIAGNOSIS — M54.17 LUMBOSACRAL RADICULOPATHY: ICD-10-CM

## 2024-07-26 RX ORDER — GABAPENTIN 300 MG/1
CAPSULE ORAL
Qty: 270 CAPSULE | Refills: 3 | OUTPATIENT
Start: 2024-07-26

## 2024-07-28 ENCOUNTER — MYC MEDICAL ADVICE (OUTPATIENT)
Dept: FAMILY MEDICINE | Facility: CLINIC | Age: 64
End: 2024-07-28
Payer: MEDICARE

## 2024-07-28 ENCOUNTER — MYC REFILL (OUTPATIENT)
Dept: FAMILY MEDICINE | Facility: CLINIC | Age: 64
End: 2024-07-28
Payer: MEDICARE

## 2024-07-28 DIAGNOSIS — Z98.890 STATUS POST SURGERY: ICD-10-CM

## 2024-07-28 DIAGNOSIS — M62.830 BACK MUSCLE SPASM: ICD-10-CM

## 2024-07-28 DIAGNOSIS — M62.838 MUSCLE SPASMS OF BOTH LOWER EXTREMITIES: ICD-10-CM

## 2024-07-28 DIAGNOSIS — F41.1 GENERALIZED ANXIETY DISORDER: ICD-10-CM

## 2024-07-29 ENCOUNTER — MYC MEDICAL ADVICE (OUTPATIENT)
Dept: FAMILY MEDICINE | Facility: CLINIC | Age: 64
End: 2024-07-29
Payer: MEDICARE

## 2024-07-29 ENCOUNTER — TELEPHONE (OUTPATIENT)
Dept: FAMILY MEDICINE | Facility: CLINIC | Age: 64
End: 2024-07-29
Payer: MEDICARE

## 2024-07-29 RX ORDER — SERTRALINE HYDROCHLORIDE 100 MG/1
TABLET, FILM COATED ORAL
Qty: 180 TABLET | Refills: 0 | Status: SHIPPED | OUTPATIENT
Start: 2024-07-29 | End: 2024-07-30

## 2024-07-29 RX ORDER — METHOCARBAMOL 500 MG/1
500 TABLET, FILM COATED ORAL 4 TIMES DAILY
Qty: 120 TABLET | Refills: 0 | Status: SHIPPED | OUTPATIENT
Start: 2024-07-29

## 2024-07-29 RX ORDER — BACLOFEN 10 MG/1
20 TABLET ORAL 3 TIMES DAILY
Qty: 360 TABLET | Refills: 3 | Status: SHIPPED | OUTPATIENT
Start: 2024-07-29 | End: 2024-08-25

## 2024-07-29 NOTE — TELEPHONE ENCOUNTER
Forms/Letter Request    Type of form/letter: Home Health Certification      Do we have the form/letter: Yes: Order [id number]& [id number]    Who is the form from? Home care    Where did/will the form come from? form was faxed in    When is form/letter needed by: [phone]    How would you like the form/letter returned: Fax : [phone]    Patient Notified form requests are processed in 5-7 business days:No    Could we send this information to you in [employer] or would you prefer to receive a phone call?:   No preference   Okay to leave a detailed message?: Yes at Home number on file [phone] (home)

## 2024-07-30 RX ORDER — SERTRALINE HYDROCHLORIDE 100 MG/1
TABLET, FILM COATED ORAL
Qty: 180 TABLET | Refills: 2 | Status: SHIPPED | OUTPATIENT
Start: 2024-07-30

## 2024-07-30 NOTE — TELEPHONE ENCOUNTER
Patient send [employer] message stating sertraline was sent to wrong pharmacy.   Correct pharmacy T'd up.   Thanks!  Livia CROCKER

## 2024-08-01 ENCOUNTER — TELEPHONE (OUTPATIENT)
Dept: GASTROENTEROLOGY | Facility: CLINIC | Age: 64
End: 2024-08-01
Payer: MEDICARE

## 2024-08-01 NOTE — TELEPHONE ENCOUNTER
Scheduled patient for Miguel A's labs after appointment.    FLORIN SotomayorN, RN, PHN  Hepatology Clinic  [facility]  [facility]

## 2024-08-01 NOTE — TELEPHONE ENCOUNTER
[facility] Call Center    Phone Message    May a detailed message be left on voicemail: yes     Reason for Call: Other: Erwin is calling in asking for a call back to discuss labs for his upcoming appt with Miguel A on 8/5. Pt states that he has had a large amount of labs recently and a recent amputation, and is wondering if these labs can be scheduled for after the appt as it is very difficult for him to get here early enough to do the labs beforehand. Please call back as soon as possible to discuss.     Action Taken: Message routed to:  [facility] ([facility]): Hep    Travel Screening: Not Applicable     Date of Service:

## 2024-08-05 ENCOUNTER — OFFICE VISIT (OUTPATIENT)
Dept: GASTROENTEROLOGY | Facility: CLINIC | Age: 64
End: 2024-08-05
Attending: INTERNAL MEDICINE
Payer: MEDICARE

## 2024-08-05 VITALS
TEMPERATURE: 97.7 F | BODY MASS INDEX: 27.88 KG/M2 | HEART RATE: 57 BPM | RESPIRATION RATE: 18 BRPM | OXYGEN SATURATION: 97 % | WEIGHT: 194.3 LBS | SYSTOLIC BLOOD PRESSURE: 135 MMHG | DIASTOLIC BLOOD PRESSURE: 76 MMHG

## 2024-08-05 DIAGNOSIS — K70.9 ALCOHOLIC LIVER DISEASE (H): ICD-10-CM

## 2024-08-05 DIAGNOSIS — K70.9 ALCOHOLIC LIVER DISEASE (H): Primary | ICD-10-CM

## 2024-08-05 PROCEDURE — 91200 LIVER ELASTOGRAPHY: CPT | Mod: 26 | Performed by: PHYSICIAN ASSISTANT

## 2024-08-05 PROCEDURE — 99214 OFFICE O/P EST MOD 30 MIN: CPT | Mod: 24 | Performed by: PHYSICIAN ASSISTANT

## 2024-08-05 PROCEDURE — G0463 HOSPITAL OUTPT CLINIC VISIT: HCPCS | Performed by: PHYSICIAN ASSISTANT

## 2024-08-05 ASSESSMENT — PAIN SCALES - GENERAL: PAINLEVEL: MODERATE PAIN (4)

## 2024-08-05 NOTE — PROGRESS NOTES
Hepatology Follow-up Clinic note  Erwin Aguilar   Date of Birth 1960  Reason for follow-up: Cirrhosis          Assessment/plan:   Erwin Aguilar is a 63 year old male with history of ETOH cirrhosis complicated by history of large esophageal varices, without any history of ascites or hepatic encephalopathy.  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  Recent Fibosis scans (in 2020 10.8 kilopascals) and elastography in December 2023 showing fibrosis regression.  Recent US elastography in December 2023 showing some variability in readings making results invalid. Discussed doing alternative fibrosis assessment today to confirm recent findings and determine need for on-going follow up for HCC screening and variceal screening.  Transaminases are normal and liver function is also normal.    # FibroScan today showing Stage 0-1, 7.6 kilopascals correlating with Liver fibrosis regression     # Due to improved fibrosis, do not feel that variceal screening with EGD is indicated.  Okay to cancel upcoming visit.    # Anemia on recent labs from May 2024:   - Colon cancer screening: continue with colonoscopy or Cologuard     #  No routine hepatology follow up needed     Miguel A Thomas PA-C   [facility] Hepatology clinic    Total time for E/M services performed on the date of the encounter 30 minutes; excluding performing and official interpretation of fibrosis scan reads.  This included review of previous: clinic visits, hospital records, lab results, imaging studies, and procedural documentation. Time also includes patient visit, documentation and discussion with other providers.  The findings from this review are summarized in the above note.   -----------------------------------------------------       HPI:   Erwin Aguilar is a 63 year old male presenting for follow-up.     Patient was last seen by Dr. Leventhal on 8/10/2023.   Recent hospitalizations or ER visits:   Was in the ED in February  2024. Had multiple ED visits for right Achilles tendon repair with infection.  S/p right superficial femoral artery stent placement on 4/23/2024. Hospitalized in early May for right below-knee amputation on 5/9/2024.  Complicated by deep wound dehiscence along the incision line, which is slowly been healing.    Appetite is good. Weight change over the past year as well as recent right BKA.    Reports getting 5000 miles last summer on his electric bike last year.    In the next few months, will be getting custom wheelchair. Then start the prosthetic process      Patient denies jaundice, lower extremity edema, abdominal distension or confusion.    Patient also denies melena, hematochezia or hematemesis.  Patient denies  fevers, sweats or chills.    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 No alcohol since 2014.          Medications:     Current Outpatient Medications   Medication Sig Dispense Refill    albuterol (PROAIR HFA/PROVENTIL HFA/VENTOLIN HFA) 108 (90 Base) MCG/ACT inhaler INHALE 2 PUFFS BY MOUTH EVERY 6 HOURS AS NEEDED FOR SHORTNESS OF BREATH OR WHEEZING (Patient taking differently: 2 puffs every 6 hours as needed INHALE 2 PUFFS BY MOUTH EVERY 6 HOURS AS NEEDED FOR SHORTNESS OF BREATH OR WHEEZING) 18 g 1    apixaban ANTICOAGULANT (ELIQUIS ANTICOAGULANT) 5 MG tablet Take 1 tablet (5 mg) by mouth 2 times daily (Patient not taking: Reported on 7/23/2024) 180 tablet 3    aspirin 81 MG EC tablet Take 1 tablet (81 mg) by mouth daily (Patient not taking: Reported on 7/23/2024) 84 tablet 0    baclofen (LIORESAL) 10 MG tablet Take 2 tablets (20 mg) by mouth 3 times daily 360 tablet 3    Continuous Blood Gluc  (FREESTYLE CACHORRO 2 READER) GASTON USE TO READ BLOOD SUGARS AS PER 'S INSTRUCTIONS (Patient not taking: Reported on 6/4/2024) 1 each 0    doxazosin (CARDURA) 8 MG tablet Take 1 tablet (8 mg) by mouth at bedtime 90 tablet 1    gabapentin (NEURONTIN) 300 MG capsule Take 600 mg by mouth  in the afternoon and 300 mg at bedtime (Patient not taking: Reported on 7/23/2024) 270 capsule 3    hydrOXYzine HCl (ATARAX) 25 MG tablet Take 1 tablet (25 mg) by mouth every 6 hours as needed for other (adjuvant pain) (Patient not taking: Reported on 6/4/2024) 30 tablet 0    insulin glargine (LANTUS SOLOSTAR) 100 UNIT/ML pen INJECT 26 UNITS SUBCUTANEOUSLY AT BEDTIME (Patient taking differently: 26 Units at bedtime INJECT 26 UNITS SUBCUTANEOUSLY AT BEDTIME) 15 mL 1    insulin pen needle (GLOBAL EASE INJECT PEN NEEDLES) 32G X 4 MM miscellaneous USE AS DIRECTED EVERY  each 1    Lidocaine (LIDOCARE) 4 % Patch Place 1 patch onto the skin every 24 hours To prevent lidocaine toxicity, patient should be patch free for 12 hrs daily. (Patient not taking: Reported on 6/4/2024) 10 patch 0    lidocaine (LMX4) 4 % external cream Apply topically once as needed for mild pain (Patient not taking: Reported on 6/4/2024) 28 g 0    medical cannabis (Patient's own supply) See Admin Instructions (The purpose of this order is to document that the patient reports taking medical cannabis.  This is not a prescription, and is not used to certify that the patient has a qualifying medical condition.)   Flower - PRN      methocarbamol (ROBAXIN) 500 MG tablet Take 1 tablet (500 mg) by mouth 4 times daily 120 tablet 0    oxyCODONE (ROXICODONE) 5 MG tablet Take 1 tablet (5 mg) by mouth 5 times daily As needed for post op pain in addition to 10mg three times daily scheduled. (Patient taking differently: Take 5 mg by mouth 5 times daily As needed for post op pain in addition to 10mg three times daily scheduled.) 30 tablet 0    oxyCODONE IR (ROXICODONE) 10 MG tablet Take 1 tablet (10 mg) by mouth 3 times daily as needed for severe pain 90 tablet 0    promethazine (PHENERGAN) 25 MG tablet TAKE 1 TABLET BY MOUTH 3 TIMES A DAY AS NEEDED FOR NAUSEA (Patient taking differently: Take by mouth every 6 hours as needed TAKE 1 TABLET BY MOUTH 3 TIMES A  DAY AS NEEDED FOR NAUSEA) 270 tablet 0    propranolol (INDERAL) 40 MG tablet Take 1 tablet (40 mg) by mouth 3 times daily 270 tablet 2    rosuvastatin (CRESTOR) 40 MG tablet Take 1 tablet (40 mg) by mouth daily 90 tablet 3    sertraline (ZOLOFT) 100 MG tablet TAKE 2 TABLETS BY MOUTH DAILY 180 tablet 2    triamterene-HCTZ (DYAZIDE) 37.5-25 MG capsule Take 1 capsule by mouth every morning 90 capsule 1     No current facility-administered medications for this visit.            Review of Systems:   10 points ROS was obtained and highlighted in the HPI, otherwise negative.          Physical Exam:   /76 (BP Location: Right arm, Patient Position: Sitting, Cuff Size: Adult Regular)   Pulse 57   Temp 97.7  F (36.5  C) (Oral)   Resp 18   Wt 88.1 kg (194 lb 4.8 oz)   SpO2 97%   BMI 27.88 kg/m      Gen: A&Ox3, NAD, well developed  HEENT: non-icteric   CV: RRR, no overt murmurs  Lung: CTA Bilatererally, no wheezing or crackles.   Lym- no palpable lymphadenopathy  Abd: soft, NT, ND, no organomegaly.   Ext: no edema, intact pulses. Right leg with wound wrapped s/p RKA   Skin: No rash, no palmar erythema, telangiectasias or jaundice  Neuro: grossly intact, no asterixis   Psych: appropriate mood and affects         Data:   Reviewed in person and significant for:    Lab Results   Component Value Date     05/10/2024     04/09/2021      Lab Results   Component Value Date    POTASSIUM 3.5 05/10/2024    POTASSIUM 3.7 07/15/2022    POTASSIUM 3.9 04/09/2021     Lab Results   Component Value Date    CHLORIDE 103 05/10/2024    CHLORIDE 108 07/15/2022    CHLORIDE 109 04/09/2021     Lab Results   Component Value Date    CO2 27 05/10/2024    CO2 24 07/15/2022    CO2 27 04/09/2021     Lab Results   Component Value Date    BUN 8.1 05/10/2024    BUN 12 07/15/2022    BUN 7 04/09/2021     Lab Results   Component Value Date    CR 0.69 05/10/2024    CR 0.71 05/10/2024    CR 0.68 04/09/2021       Lab Results   Component Value  Date    WBC 7.7 05/10/2024    WBC 6.5 04/09/2021     Lab Results   Component Value Date    HGB 12.9 05/10/2024    HGB 14.1 04/09/2021     Lab Results   Component Value Date    HCT 36.6 05/10/2024    HCT 40.6 04/09/2021     Lab Results   Component Value Date    MCV 88 05/10/2024    MCV 85 04/09/2021     Lab Results   Component Value Date     05/10/2024     04/09/2021       Lab Results   Component Value Date    AST 32 05/10/2024    AST 14 04/09/2021     Lab Results   Component Value Date    ALT 18 05/10/2024    ALT 27 04/09/2021     Lab Results   Component Value Date    BILICONJ 0.0 07/29/2014      Lab Results   Component Value Date    BILITOTAL 0.7 05/10/2024    BILITOTAL 0.7 04/09/2021       Lab Results   Component Value Date    ALBUMIN 4.2 05/10/2024    ALBUMIN 4.0 07/15/2022    ALBUMIN 4.1 04/09/2021     Lab Results   Component Value Date    PROTTOTAL 7.0 05/10/2024    PROTTOTAL 8.0 04/09/2021      Lab Results   Component Value Date    ALKPHOS 106 05/10/2024    ALKPHOS 115 04/09/2021       Lab Results   Component Value Date    INR 1.23 01/05/2024    INR 1.01 04/09/2021     DATE OF EXAM:   01/21/2020     INDICATION:   Liver fibrosis assessment     COMPARISON:    None     HISTORY:   Cirrhosis - ETOH     FINDINGS:   Mean elasticity is 10.8 KPa     IMPRESSION:    Estimated liver fibrosis is Metavir stage F3.             Exam: US ABDOMEN COMPLETE, 1/6/2024 10:02 AM     Indication: Patient at high risk for HCC. Screening for HCC and portal  hypertension; Alcoholic cirrhosis of liver without ascites (H)     Comparison: Abdominal ultrasound 8/23/2023, 9/7/2022     Technique: The abdomen was scanned in the standard fashion with  specialized ultrasound transducer(s) using both gray scale and limited  color/spectral Doppler techniques.     Findings:     Liver:     The liver demonstrates a mildly coarsened echotexture with mild  nodularity of the hepatic contour. The liver measures 16.7 cm. No mass  or  intrahepatic biliary ductal dilatation. The main portal vein is  patent with antegrade flow, measuring 1.4 cm in diameter.     US visualization score: A - No or minimal limitations     Gallbladder: Cholelithiasis in the gallbladder neck. No  pericholecystic fluid, gallbladder wall thickening, or sonographic  Best sign.     Bile Ducts: Both the intra- and extrahepatic biliary system are of  normal caliber. The common bile duct measures 4 mm in diameter.     Pancreas: Visualized portion of the head of the pancreas is  unremarkable. The body and tail are not well-visualized.     Kidneys: Both kidneys are of normal echotexture, without mass nor  hydronephrosis. The craniocaudal dimensions are: right- 11.2 cm, left-  13.8 cm.     Spleen: The spleen is mildly enlarged, measuring 13.3 cm in sagittal  dimension, similar to prior.     Aorta and IVC: The visualized portions of the aorta and IVC are  unremarkable. The proximal aorta measures 2.9 cm in diameter.     Fluid: No evidence of ascites or pleural effusions.                                                                      Impression:  1. Cirrhotic hepatic morphology. No focal liver lesion.  a. LI-RADS US Category: US-1 Negative: No US evidence of HCC  b. Recommend continued surveillance US.  2. Stable mild splenomegaly, suggesting portal hypertension.  3. Cholelithiasis.      -------------------------------------    EXAMINATION: US ABDOMEN COMPLETE, ultrasound Elastography 8/23/2023  10:06 AM      COMPARISON: None.     HISTORY: Alcoholic cirrhosis.     TECHNIQUE: The abdomen was scanned in standard fashion with  specialized ultrasound transducer(s) using both gray-scale and limited  color Doppler techniques.     Ultrasound elastography of the liver was performed using a [employer]  ultrasound machine using 10 separate measurements of the liver  parenchyma with patient in supine position. Measurements were obtained  approximately 2 cm beneath Lou's capsule,  perpendicular to the  liver capsule. Images are of satisfactory quality.     FINDINGS:  Liver: The liver is coarsened and echogenic compatible with intrinsic  liver disease. The main portal vein is patent with antegrade flow. No  focal liver masses.     Gallbladder: Cholelithiasis. No wall thickening or pericholecystic  fluid.     Bile Ducts: Both the intra- and extrahepatic biliary system are of  normal caliber.  The common bile duct measures 4 mm in diameter.     Pancreas: Visualized portions of the head and body of the pancreas are  unremarkable.      Kidneys: Both kidneys are of normal echotexture, without mass or  hydronephrosis.   The craniocaudal dimensions are: right- 12.4, left-  13.1 cm.     Spleen: Splenomegaly, measuring 14.6 cm in sagittal dimension.     Aorta and IVC: The visualized portions of the aorta and IVC are  unremarkable. The proximal aorta measures 2.8 cm in diameter.     Fluid: No evidence of ascites or pleural effusions.        The median liver stiffness is: 1.31 m/sec  The mean liver stiffness is: 1.34 m/sec  The interquartile range is: 0.255  IQR/median: 0.21.  (IQR/median value of greater than 0.15 indicates  that a dataset is unreliable)

## 2024-08-05 NOTE — NURSING NOTE
"Chief Complaint   Patient presents with    RECHECK     Cirrhosis      Vital signs:  Temp: 97.7  F (36.5  C) Temp src: Oral BP: 135/76 Pulse: 57   Resp: 18 SpO2: 97 %       Weight: 88.1 kg (194 lb 4.8 oz)  Estimated body mass index is 27.88 kg/m  as calculated from the following:    Height as of 7/23/24: 1.778 m (5' 10\").    Weight as of this encounter: 88.1 kg (194 lb 4.8 oz).      Mildred Zhao, [facility]  8/5/2024 8:15 AM    "

## 2024-08-05 NOTE — LETTER
8/5/2024      Erwin Aguilar  [address]  [place] [address]      Dear Colleague,    Thank you for referring your patient, Ewrin Aguilar, to the [facility] [place]. Please see a copy of my visit note below.    Hepatology Follow-up Clinic note  Erwin Aguilar   Date of Birth 1960  Reason for follow-up: Cirrhosis          Assessment/plan:   Erwin Aguilar is a 63 year old male with history of ETOH cirrhosis complicated by history of large esophageal varices, without any history of ascites or hepatic encephalopathy.  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  Recent Fibosis scans (in 2020 10.8 kilopascals) and elastography in December 2023 showing fibrosis regression.  Recent US elastography in December 2023 showing some variability in readings making results invalid. Discussed doing alternative fibrosis assessment today to confirm recent findings and determine need for on-going follow up for HCC screening and variceal screening.  Transaminases are normal and liver function is also normal.    # FibroScan today showing Stage 0-1, 7.6 kilopascals correlating with Liver fibrosis regression     # Due to improved fibrosis, do not feel that variceal screening with EGD is indicated.  Okay to cancel upcoming visit.    # Anemia on recent labs from May 2024:   - Colon cancer screening: continue with colonoscopy or Cologuard     #  No routine hepatology follow up needed     Miguel A Thomas PA-C   [facility] Hepatology clinic    Total time for E/M services performed on the date of the encounter 30 minutes; excluding performing and official interpretation of fibrosis scan reads.  This included review of previous: clinic visits, hospital records, lab results, imaging studies, and procedural documentation. Time also includes patient visit, documentation and discussion with other providers.  The findings from this review are summarized in the above note.    -----------------------------------------------------       HPI:   Erwin Aguilar is a 63 year old male presenting for follow-up.     Patient was last seen by Dr. Leventhal on 8/10/2023.   Recent hospitalizations or ER visits:   Was in the ED in February 2024. Had multiple ED visits for right Achilles tendon repair with infection.  S/p right superficial femoral artery stent placement on 4/23/2024. Hospitalized in early May for right below-knee amputation on 5/9/2024.  Complicated by deep wound dehiscence along the incision line, which is slowly been healing.    Appetite is good. Weight change over the past year as well as recent right BKA.    Reports getting 5000 miles last summer on his electric bike last year.    In the next few months, will be getting custom wheelchair. Then start the prosthetic process      Patient denies jaundice, lower extremity edema, abdominal distension or confusion.    Patient also denies melena, hematochezia or hematemesis.  Patient denies  fevers, sweats or chills.    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 No alcohol since 2014.          Medications:     Current Outpatient Medications   Medication Sig Dispense Refill     albuterol (PROAIR HFA/PROVENTIL HFA/VENTOLIN HFA) 108 (90 Base) MCG/ACT inhaler INHALE 2 PUFFS BY MOUTH EVERY 6 HOURS AS NEEDED FOR SHORTNESS OF BREATH OR WHEEZING (Patient taking differently: 2 puffs every 6 hours as needed INHALE 2 PUFFS BY MOUTH EVERY 6 HOURS AS NEEDED FOR SHORTNESS OF BREATH OR WHEEZING) 18 g 1     apixaban ANTICOAGULANT (ELIQUIS ANTICOAGULANT) 5 MG tablet Take 1 tablet (5 mg) by mouth 2 times daily (Patient not taking: Reported on 7/23/2024) 180 tablet 3     aspirin 81 MG EC tablet Take 1 tablet (81 mg) by mouth daily (Patient not taking: Reported on 7/23/2024) 84 tablet 0     baclofen (LIORESAL) 10 MG tablet Take 2 tablets (20 mg) by mouth 3 times daily 360 tablet 3     Continuous Blood Gluc  (FREESTYLE CACHORRO 2 READER) GASTON  USE TO READ BLOOD SUGARS AS PER 'S INSTRUCTIONS (Patient not taking: Reported on 6/4/2024) 1 each 0     doxazosin (CARDURA) 8 MG tablet Take 1 tablet (8 mg) by mouth at bedtime 90 tablet 1     gabapentin (NEURONTIN) 300 MG capsule Take 600 mg by mouth in the afternoon and 300 mg at bedtime (Patient not taking: Reported on 7/23/2024) 270 capsule 3     hydrOXYzine HCl (ATARAX) 25 MG tablet Take 1 tablet (25 mg) by mouth every 6 hours as needed for other (adjuvant pain) (Patient not taking: Reported on 6/4/2024) 30 tablet 0     insulin glargine (LANTUS SOLOSTAR) 100 UNIT/ML pen INJECT 26 UNITS SUBCUTANEOUSLY AT BEDTIME (Patient taking differently: 26 Units at bedtime INJECT 26 UNITS SUBCUTANEOUSLY AT BEDTIME) 15 mL 1     insulin pen needle (GLOBAL EASE INJECT PEN NEEDLES) 32G X 4 MM miscellaneous USE AS DIRECTED EVERY  each 1     Lidocaine (LIDOCARE) 4 % Patch Place 1 patch onto the skin every 24 hours To prevent lidocaine toxicity, patient should be patch free for 12 hrs daily. (Patient not taking: Reported on 6/4/2024) 10 patch 0     lidocaine (LMX4) 4 % external cream Apply topically once as needed for mild pain (Patient not taking: Reported on 6/4/2024) 28 g 0     medical cannabis (Patient's own supply) See Admin Instructions (The purpose of this order is to document that the patient reports taking medical cannabis.  This is not a prescription, and is not used to certify that the patient has a qualifying medical condition.)   Flower - PRN       methocarbamol (ROBAXIN) 500 MG tablet Take 1 tablet (500 mg) by mouth 4 times daily 120 tablet 0     oxyCODONE (ROXICODONE) 5 MG tablet Take 1 tablet (5 mg) by mouth 5 times daily As needed for post op pain in addition to 10mg three times daily scheduled. (Patient taking differently: Take 5 mg by mouth 5 times daily As needed for post op pain in addition to 10mg three times daily scheduled.) 30 tablet 0     oxyCODONE IR (ROXICODONE) 10 MG tablet Take 1  tablet (10 mg) by mouth 3 times daily as needed for severe pain 90 tablet 0     promethazine (PHENERGAN) 25 MG tablet TAKE 1 TABLET BY MOUTH 3 TIMES A DAY AS NEEDED FOR NAUSEA (Patient taking differently: Take by mouth every 6 hours as needed TAKE 1 TABLET BY MOUTH 3 TIMES A DAY AS NEEDED FOR NAUSEA) 270 tablet 0     propranolol (INDERAL) 40 MG tablet Take 1 tablet (40 mg) by mouth 3 times daily 270 tablet 2     rosuvastatin (CRESTOR) 40 MG tablet Take 1 tablet (40 mg) by mouth daily 90 tablet 3     sertraline (ZOLOFT) 100 MG tablet TAKE 2 TABLETS BY MOUTH DAILY 180 tablet 2     triamterene-HCTZ (DYAZIDE) 37.5-25 MG capsule Take 1 capsule by mouth every morning 90 capsule 1     No current facility-administered medications for this visit.            Review of Systems:   10 points ROS was obtained and highlighted in the HPI, otherwise negative.          Physical Exam:   /76 (BP Location: Right arm, Patient Position: Sitting, Cuff Size: Adult Regular)   Pulse 57   Temp 97.7  F (36.5  C) (Oral)   Resp 18   Wt 88.1 kg (194 lb 4.8 oz)   SpO2 97%   BMI 27.88 kg/m      Gen: A&Ox3, NAD, well developed  HEENT: non-icteric   CV: RRR, no overt murmurs  Lung: CTA Bilatererally, no wheezing or crackles.   Lym- no palpable lymphadenopathy  Abd: soft, NT, ND, no organomegaly.   Ext: no edema, intact pulses. Right leg with wound wrapped s/p RKA   Skin: No rash, no palmar erythema, telangiectasias or jaundice  Neuro: grossly intact, no asterixis   Psych: appropriate mood and affects         Data:   Reviewed in person and significant for:    Lab Results   Component Value Date     05/10/2024     04/09/2021      Lab Results   Component Value Date    POTASSIUM 3.5 05/10/2024    POTASSIUM 3.7 07/15/2022    POTASSIUM 3.9 04/09/2021     Lab Results   Component Value Date    CHLORIDE 103 05/10/2024    CHLORIDE 108 07/15/2022    CHLORIDE 109 04/09/2021     Lab Results   Component Value Date    CO2 27 05/10/2024    CO2  24 07/15/2022    CO2 27 04/09/2021     Lab Results   Component Value Date    BUN 8.1 05/10/2024    BUN 12 07/15/2022    BUN 7 04/09/2021     Lab Results   Component Value Date    CR 0.69 05/10/2024    CR 0.71 05/10/2024    CR 0.68 04/09/2021       Lab Results   Component Value Date    WBC 7.7 05/10/2024    WBC 6.5 04/09/2021     Lab Results   Component Value Date    HGB 12.9 05/10/2024    HGB 14.1 04/09/2021     Lab Results   Component Value Date    HCT 36.6 05/10/2024    HCT 40.6 04/09/2021     Lab Results   Component Value Date    MCV 88 05/10/2024    MCV 85 04/09/2021     Lab Results   Component Value Date     05/10/2024     04/09/2021       Lab Results   Component Value Date    AST 32 05/10/2024    AST 14 04/09/2021     Lab Results   Component Value Date    ALT 18 05/10/2024    ALT 27 04/09/2021     Lab Results   Component Value Date    BILICONJ 0.0 07/29/2014      Lab Results   Component Value Date    BILITOTAL 0.7 05/10/2024    BILITOTAL 0.7 04/09/2021       Lab Results   Component Value Date    ALBUMIN 4.2 05/10/2024    ALBUMIN 4.0 07/15/2022    ALBUMIN 4.1 04/09/2021     Lab Results   Component Value Date    PROTTOTAL 7.0 05/10/2024    PROTTOTAL 8.0 04/09/2021      Lab Results   Component Value Date    ALKPHOS 106 05/10/2024    ALKPHOS 115 04/09/2021       Lab Results   Component Value Date    INR 1.23 01/05/2024    INR 1.01 04/09/2021     DATE OF EXAM:   01/21/2020     INDICATION:   Liver fibrosis assessment     COMPARISON:    None     HISTORY:   Cirrhosis - ETOH     FINDINGS:   Mean elasticity is 10.8 KPa     IMPRESSION:    Estimated liver fibrosis is Metavir stage F3.             Exam: US ABDOMEN COMPLETE, 1/6/2024 10:02 AM     Indication: Patient at high risk for HCC. Screening for HCC and portal  hypertension; Alcoholic cirrhosis of liver without ascites (H)     Comparison: Abdominal ultrasound 8/23/2023, 9/7/2022     Technique: The abdomen was scanned in the standard fashion  with  specialized ultrasound transducer(s) using both gray scale and limited  color/spectral Doppler techniques.     Findings:     Liver:     The liver demonstrates a mildly coarsened echotexture with mild  nodularity of the hepatic contour. The liver measures 16.7 cm. No mass  or intrahepatic biliary ductal dilatation. The main portal vein is  patent with antegrade flow, measuring 1.4 cm in diameter.     US visualization score: A - No or minimal limitations     Gallbladder: Cholelithiasis in the gallbladder neck. No  pericholecystic fluid, gallbladder wall thickening, or sonographic  Best sign.     Bile Ducts: Both the intra- and extrahepatic biliary system are of  normal caliber. The common bile duct measures 4 mm in diameter.     Pancreas: Visualized portion of the head of the pancreas is  unremarkable. The body and tail are not well-visualized.     Kidneys: Both kidneys are of normal echotexture, without mass nor  hydronephrosis. The craniocaudal dimensions are: right- 11.2 cm, left-  13.8 cm.     Spleen: The spleen is mildly enlarged, measuring 13.3 cm in sagittal  dimension, similar to prior.     Aorta and IVC: The visualized portions of the aorta and IVC are  unremarkable. The proximal aorta measures 2.9 cm in diameter.     Fluid: No evidence of ascites or pleural effusions.                                                                      Impression:  1. Cirrhotic hepatic morphology. No focal liver lesion.  a. LI-RADS US Category: US-1 Negative: No US evidence of HCC  b. Recommend continued surveillance US.  2. Stable mild splenomegaly, suggesting portal hypertension.  3. Cholelithiasis.      -------------------------------------    EXAMINATION: US ABDOMEN COMPLETE, ultrasound Elastography 8/23/2023  10:06 AM      COMPARISON: None.     HISTORY: Alcoholic cirrhosis.     TECHNIQUE: The abdomen was scanned in standard fashion with  specialized ultrasound transducer(s) using both gray-scale and  limited  color Doppler techniques.     Ultrasound elastography of the liver was performed using a [employer]  ultrasound machine using 10 separate measurements of the liver  parenchyma with patient in supine position. Measurements were obtained  approximately 2 cm beneath Lou's capsule, perpendicular to the  liver capsule. Images are of satisfactory quality.     FINDINGS:  Liver: The liver is coarsened and echogenic compatible with intrinsic  liver disease. The main portal vein is patent with antegrade flow. No  focal liver masses.     Gallbladder: Cholelithiasis. No wall thickening or pericholecystic  fluid.     Bile Ducts: Both the intra- and extrahepatic biliary system are of  normal caliber.  The common bile duct measures 4 mm in diameter.     Pancreas: Visualized portions of the head and body of the pancreas are  unremarkable.      Kidneys: Both kidneys are of normal echotexture, without mass or  hydronephrosis.   The craniocaudal dimensions are: right- 12.4, left-  13.1 cm.     Spleen: Splenomegaly, measuring 14.6 cm in sagittal dimension.     Aorta and IVC: The visualized portions of the aorta and IVC are  unremarkable. The proximal aorta measures 2.8 cm in diameter.     Fluid: No evidence of ascites or pleural effusions.        The median liver stiffness is: 1.31 m/sec  The mean liver stiffness is: 1.34 m/sec  The interquartile range is: 0.255  IQR/median: 0.21.  (IQR/median value of greater than 0.15 indicates  that a dataset is unreliable)            Again, thank you for allowing me to participate in the care of your patient.        Sincerely,        Miguel A Thomas PA-C

## 2024-08-12 ENCOUNTER — MYC MEDICAL ADVICE (OUTPATIENT)
Dept: FAMILY MEDICINE | Facility: CLINIC | Age: 64
End: 2024-08-12
Payer: MEDICARE

## 2024-08-13 ENCOUNTER — MEDICAL CORRESPONDENCE (OUTPATIENT)
Dept: HEALTH INFORMATION MANAGEMENT | Facility: CLINIC | Age: 64
End: 2024-08-13

## 2024-08-13 ENCOUNTER — TELEPHONE (OUTPATIENT)
Dept: WOUND CARE | Facility: CLINIC | Age: 64
End: 2024-08-13
Payer: MEDICARE

## 2024-08-13 ENCOUNTER — THERAPY VISIT (OUTPATIENT)
Dept: OCCUPATIONAL THERAPY | Facility: CLINIC | Age: 64
End: 2024-08-13
Attending: FAMILY MEDICINE
Payer: MEDICARE

## 2024-08-13 DIAGNOSIS — Z89.511 S/P BELOW KNEE AMPUTATION, RIGHT (H): ICD-10-CM

## 2024-08-13 DIAGNOSIS — Z74.09 IMPAIRED MOBILITY AND ADLS: ICD-10-CM

## 2024-08-13 DIAGNOSIS — Z78.9 IMPAIRED MOBILITY AND ADLS: ICD-10-CM

## 2024-08-13 DIAGNOSIS — Z89.511 S/P BKA (BELOW KNEE AMPUTATION), RIGHT (H): ICD-10-CM

## 2024-08-13 DIAGNOSIS — G56.02 LEFT CARPAL TUNNEL SYNDROME: Primary | ICD-10-CM

## 2024-08-13 DIAGNOSIS — T14.8XXA OPEN WOUND: ICD-10-CM

## 2024-08-13 PROCEDURE — 97542 WHEELCHAIR MNGMENT TRAINING: CPT | Mod: GO | Performed by: OCCUPATIONAL THERAPIST

## 2024-08-13 NOTE — PROGRESS NOTES
"                                                                             SEATING AND WHEELED MOBILITY ASSESSMENT    [facility]  Occupational Therapy   Date of service: August 13, 2024    Referring provider:   Joel Daniel Irwin Wegener, MD     Order Date 7/24/24  Onset Date: 7/24/24    Order Details: olga junior    Funding:Medicare    Vendor: Moni VELASCO from [employer]    Height/Weight: 5\"10 / 194    Medical diagnosis:   Nervous and Auditory  Chronic pain syndrome  Right knee pain  Lumbosacral radiculopathy  Type 2 diabetes mellitus with diabetic polyneuropathy, with long-term current use of insulin (H)  Right ankle pain  Cubital tunnel syndrome on left  Left carpal tunnel syndrome  Carpal tunnel syndrome of left wrist  Neuropathy in diabetes (H)  Neuropathic pain syndrome (non-herpetic)  CRPS (complex regional pain syndrome type I)  Chronic pain of left thumb  Atherosclerosis of native arteries of extremities with rest pain, other extremity (H)  Coronary artery disease involving native coronary artery of native heart with angina pectoris (H24)     Respiratory  Moderate persistent asthma     Digestive  GERD (gastroesophageal reflux disease)  Nausea without vomiting  Calculus of gallbladder without cholecystitis without obstruction  Hematemesis with nausea  Gastroparesis  Noninfectious gastroenteritis and colitis  Neurogenic bowel     Endocrine  Type 2 diabetes, HbA1c goal < 7% (H)  Hyperlipidemia LDL goal <100  Hypokalemia  Type 2 diabetes mellitus without complication, with long-term current use of insulin (H)     Circulatory  Hypertension goal BP (blood pressure) < 140/90  Superficial thrombophlebitis of arm  Paroxysmal atrial fibrillation (H)  Transient ischemic attack (TIA)  PVD (peripheral vascular disease) (H24)  PAD (peripheral artery disease) (H24)     Musculoskeletal and Integumentary  Generalized muscle weakness  Diastasis recti  Wound infection  Atopic dermatitis  Muscle spasms " of Upper extremity  Edema of left lower extremity  Skin infection  Cellulitis of hand  Dry skin dermatitis  Open wound of right heel  Closed fracture of right patella  Wound, open, buttock, right  Benign neoplasm of skin of trunk, except scrotum  Osteoarthritis of lumbar spine, unspecified spinal osteoarthritis complication status  Acquired calcaneus deformity of right ankle  Calcific Achilles tendinitis  Foot-drop  Skin ulcer of right ankle with necrosis of muscle (H)  Wound dehiscence     Urinary  Neurogenic bladder     Hematologic  Anemia due to blood loss, acute     Infectious/Inflammatory  Cellulitis     Behavioral  Generalized anxiety disorder  Major depressive disorder, recurrent episode, mild (H24)  Adjustment disorder with anxious mood  Psychophysiological insomnia  Physical deconditioning     Other  Abnormal liver function tests  Abnormal EMG  Hernia  Ganglion cyst  Fall  S/P insertion of spinal cord stimulator  Alcoholism in remission (H)  H/O foot surgery  At risk for prolonged QT interval syndrome  Stress  Gait abnormality  Contact with and (suspected) exposure to covid-19  Orthopedic aftercare  Surgery, elective    5/9/2024  Amputate leg below knee (Right) Procedure: Right below knee amputation (transtibial amputation);  Surgeon: Kurtis Nye MD;  Location:  OR  4/23/2024  Ir lower extremity angiogram right  4/23/2024  Angiogram (Right) Procedure: Ultrasound guided percutaneous transarterial left common femoral artery access, diagnostic aortoiliac angiogram, selective cannulation of right comon iliac, righ external iliac, right common femoral, and right superficial femoral artery, balloon angioplasty of right superficial femoral artery, 6mm x 12 cm self-expanding drug-coated stent placement of right superficial femoral artery, lef  8/31/2023  Decompression cubital tunnel (Left) Procedure: LEFT CUBITAL TUNNEL RELEASE,;  Surgeon: Deny Dixon MD;  [place] [facility] [place]  8/31/2023  Release carpal tunnel  (Left) Procedure: LEFT OPEN CARPAL TUNNEL RELEASE,;  Surgeon: Deny Dixon MD;  Location: [facility] [place]  8/31/2023  Release trigger finger (Left) Procedure: Release left trigger finger thumb;  Surgeon: Deny Dixon MD;  Location: [facility] [place] OR  11/11/2020  Repair tendon achilles (Right) Procedure: Right achilles debridement and partial calcaneus excision;  Surgeon: Eh Sandoval MD;  Location: [facility] [place] OR  8/2/2018  Esophagoscopy, gastroscopy, duodenoscopy (egd), combined (N/A) Procedure: COMBINED ESOPHAGOSCOPY, GASTROSCOPY, DUODENOSCOPY (EGD);  EGD;  Surgeon: Yu Wagner MD;  Location: [facility]  6/28/2018  Remove generator stimulator (location) (N/A) Procedure: REMOVE GENERATOR STIMULATOR (LOCATION);  Spinal Cord Stimulator and IPG Explant and Re-Implant of SCS System (Leads and IPG);  Surgeon: Elvis Sauceda MD;  Location:  OR  6/28/2018  Insert stimulator dorsal column (N/A) Procedure: INSERT STIMULATOR DORSAL COLUMN;;  Surgeon: Elvis Sauceda MD;  Location:  OR  5/12/2016  Colonoscopy (N/A) Procedure: COMBINED COLONOSCOPY, SINGLE OR MULTIPLE BIOPSY/POLYPECTOMY BY BIOPSY;  Surgeon: Ana Paula Urbina MD;  Location: [facility]  5/12/2016  Esophagoscopy, gastroscopy, duodenoscopy (egd), combined (N/A) Procedure: COMBINED ESOPHAGOSCOPY, GASTROSCOPY, DUODENOSCOPY (EGD);  Surgeon: Ana Paula Urbina MD;  Location: [facility]  1-2016  Ent surgery Ongoing since then  10/31/2014  Esophagoscopy, gastroscopy, duodenoscopy (egd), combined (N/A) Procedure: COMBINED ESOPHAGOSCOPY, GASTROSCOPY, DUODENOSCOPY (EGD);  Surgeon: Gerrad Samaneigo MD;  Location: [facility]  7/24/2014  Esophagoscopy, gastroscopy, duodenoscopy (egd), combined Procedure: COMBINED ESOPHAGOSCOPY, GASTROSCOPY, DUODENOSCOPY (EGD);  Surgeon: Gerard Samaniego MD;  Location: [facility]  6/9/2014  Esophagoscopy, gastroscopy, duodenoscopy (egd), combined Procedure: COMBINED ESOPHAGOSCOPY, GASTROSCOPY,  DUODENOSCOPY (EGD);  Surgeon: Curtis Mendez MD;  Location:  GI  3/28/2014  Esophagoscopy, gastroscopy, duodenoscopy (egd), combined Procedure: COMBINED ESOPHAGOSCOPY, GASTROSCOPY, DUODENOSCOPY (EGD);  EGD, Gastric Biopsies, Esophageal Banding;  Surgeon: Reyna Tovar MD;  Location:  OR  2014  Abdomen surgery  11/8/2012  Herniorrhaphy umbilical Procedure: HERNIORRHAPHY UMBILICAL;  Open Umbilical Hernia Repair With Mesh ;  Surgeon: Chase Nicholson MD;  Location:  OR  10/19/2011  Endoscopy upper, colonoscopy, combined Procedure:COMBINED ENDOSCOPY UPPER, COLONOSCOPY; Upper Endoscopy, Colonoscopy with Polypectomy x4; Surgeon:AMBAR RODRÍGUEZ; Location: OR  2010  neuro stimulator [Other]  August 2009  Back surgery  10/07  Hcl squamous cell carcinoma ag left forearm  1/02  Surgical history of - abcess tooth  1999  Surgical history of - L4-5 laminectomy, cauda equina syndrome  1974  Zc appendectomy  10 1964  Tonsillectomy  +  Surgical history of - herniated disk repair  Date Unknown  Transposition ulnar nerve (elbow) right    Pertinent medical history/surgery:  Was in the ED in February 2024. Had multiple ED visits for right Achilles tendon repair with infection.  S/p right superficial femoral artery stent placement on 4/23/2024. Hospitalized in early May for right below-knee amputation on 5/9/2024.  Complicated by deep wound dehiscence along the incision line, which is slowly been healing.     Patient concerns/goals: mobility    Living environment:  Apartment    Living environment barriers:  Ramp(s) present      Current assistance/living environment:  Lives with partner whom is in a wheelchair    Community Mobility:  Transportation: Car  Community Mobility Requirements: Medical Appointments, Shopping    Current mobility equipment:  Manual wheelchair  Private pay manual chair    Patient Measurements- per atp notes    Fall Risk Screen:   Has the patient fallen 2 or more times in the last year?  Yes      Has the patient fallen and had an injury in the past year? Yes    Is the patient a fall risk? Yes, department fall risk interventions implemented    ADL:   Feeding self:  ind  Grooming: ind  UE Dressing:  ind  LE Dressing:  ind  Showering/bathing: sponge bathing as he cannot get into tub. Grab bars  Toileting/transfer:  ind- grab bars  Functional Mobility: manual chair    IADL: ind for him and his wife  Driving:  assistance  Leisure: rides E bike,     Evaluation   Pain assessment:  Nerve pain in LE   improved 80% since surgeer    Cognition:  wnl    Vision: wnl    Hearing: wnl    Fatigue:  Reported Problems: Limited endurance for community mobility    Balance:  Unsupported Sitting Balance: Uses UE for Balance  Sitting Balance in Chair: Uses UE for Balance  Standing Balance: Uses UE for Balance, Not currently walking, minimal standing    Ambulation:  Level of [place]: Independent  Assistive Device(s): Wheelchair (manual) standard manual chair that is challenging to propel independently    Transfers:   ind    Neuromuscular:  History of Pressure Sores: Yes   Prior sores of feet and buttocks    Coordination:  Ulnar nerve clumsiness    Tone:   Hypotonic    Sensation:  Sensory Deficits Reported: Le numb    Head and Neck:  Head and Neck Position: WFL  Head Control: Adequate    Upper Extremities  UE ROM: WFL  UE Strength: UE Strength WFL  Dominance: Right    Pelvis  Anterior/Posterior Pelvis Position: Partially Flexible, Posterior Tilt    Trunk:  Anterior/Posterior Trunk Position: Increased Thoracic Kyphosis, Partially Flexible  Not able to lay flat due to Low back deficits from surgeries and pain    Lower Extremities:  LE ROM: WFL  LE Strength: UE Strength WFL  Foot Positioning: WFL    Edema: Stump not fully healed and edemtous     Impairments:  Fatigue  Muscle atrophy  Coordination  Balance  Pain  Range of motion     Treatment diagnosis:  Impaired mobility  Impaired activities of daily  living     Recommendations/Plan of care:  Patient would benefit from interventions to enhance safety and independence.  Occupational therapy intervention for  wheelchair management.    Goals:   Target date:   Patient, family and/or caregiver will verbalize/demonstrate understanding of compensatory methods /equipment to enhance functional independence and safety.    Educational assessment/barriers to learning:  No barriers noted    Treatment provided this date:   Wheelchair management, 40 minutes   Determine need for proper fitting ultralight weight manual wheelchair in order to aid in optimal independence with MRADLS and IADLs with recent amputation.  Safe trials done here in clinic and better trials to be done with vendor of Toy folding K5 manual wheelchair.    Response to treatment/recommendations: Receptive    Goal attainment:  All goals met    Risks and benefits of evaluation/treatment have been explained.  Patient, family and/or caregiver are in agreement with Plan of Care.     Timed Code Treatment Minutes: 40  Total Treatment Time (sum of timed and untimed services): 40    Electronically signed by:  Moni HENDEROSN/FAYE, ATP      Occupational Therapist, Assistive   [phone]      fax: [phone]      [email or web address], [address]  [place]   [address]  August 13, 2024

## 2024-08-13 NOTE — TELEPHONE ENCOUNTER
Voicemail left for patient to offer sooner appointment that has opened up. Call back number given. (Currently there is an opening on 8-15-24 with Sarah Jules)

## 2024-08-13 NOTE — TELEPHONE ENCOUNTER
Consult received via Workqueue from     Wegener, Joel Daniel Irwin, MD in [facility]    for wound of the right BKA.    Does the patient have an open and draining wound? Yes    Please schedule with Grecia Gonzalez PA-C, Sarah Jules NP, Tom Liz M.D., Se Galeano, TUCKER, or Sharif Up M.D. at [facility] [place] for next available appointment.    **For all providers, Nishi Guillory PA-C, Dr. Sahni, Sarah Jules NP or Dr. Up, please schedule a follow up 4 weeks after initial appointment.**  --If unable to schedule within 2-3 weeks then please place on cancellation list--    Is the patient able to make their own medical decisions? Yes    Can the patient be scheduled on a Wednesday (Jeovanny) or Thursday (Dhara)? Yes    Is patient a SOFIA lift? PLEASE INQUIRE WHEN MAKING THE APPOINTMENT AND PUT IN APPOINTMENT NOTES    Routing to  Wound Healing Scheduling.

## 2024-08-25 ENCOUNTER — MYC REFILL (OUTPATIENT)
Dept: FAMILY MEDICINE | Facility: CLINIC | Age: 64
End: 2024-08-25
Payer: MEDICARE

## 2024-08-25 DIAGNOSIS — M62.838 MUSCLE SPASMS OF BOTH LOWER EXTREMITIES: ICD-10-CM

## 2024-08-25 DIAGNOSIS — M62.830 BACK MUSCLE SPASM: ICD-10-CM

## 2024-08-26 ENCOUNTER — MYC REFILL (OUTPATIENT)
Dept: FAMILY MEDICINE | Facility: CLINIC | Age: 64
End: 2024-08-26
Payer: MEDICARE

## 2024-08-26 DIAGNOSIS — S86.011A RUPTURE OF RIGHT ACHILLES TENDON, INITIAL ENCOUNTER: ICD-10-CM

## 2024-08-26 DIAGNOSIS — M79.671 INTRACTABLE RIGHT HEEL PAIN: ICD-10-CM

## 2024-08-26 DIAGNOSIS — M21.6X1 ACQUIRED CALCANEUS DEFORMITY OF RIGHT ANKLE: ICD-10-CM

## 2024-08-26 DIAGNOSIS — M25.571 CHRONIC PAIN OF RIGHT ANKLE: ICD-10-CM

## 2024-08-26 DIAGNOSIS — G89.4 CHRONIC PAIN SYNDROME: ICD-10-CM

## 2024-08-26 DIAGNOSIS — G89.29 CHRONIC PAIN OF RIGHT ANKLE: ICD-10-CM

## 2024-08-26 DIAGNOSIS — Z98.890 H/O FOOT SURGERY: ICD-10-CM

## 2024-08-26 DIAGNOSIS — M65.28 CALCIFIC ACHILLES TENDINITIS: ICD-10-CM

## 2024-08-26 RX ORDER — OXYCODONE HYDROCHLORIDE 10 MG/1
10 TABLET ORAL 3 TIMES DAILY PRN
Qty: 90 TABLET | Refills: 0 | Status: SHIPPED | OUTPATIENT
Start: 2024-08-30 | End: 2024-09-23

## 2024-08-26 RX ORDER — BACLOFEN 10 MG/1
20 TABLET ORAL 3 TIMES DAILY
Qty: 360 TABLET | Refills: 3 | Status: SHIPPED | OUTPATIENT
Start: 2024-08-26

## 2024-08-27 ENCOUNTER — MYC MEDICAL ADVICE (OUTPATIENT)
Dept: FAMILY MEDICINE | Facility: CLINIC | Age: 64
End: 2024-08-27
Payer: MEDICARE

## 2024-08-29 ENCOUNTER — NURSE TRIAGE (OUTPATIENT)
Dept: FAMILY MEDICINE | Facility: CLINIC | Age: 64
End: 2024-08-29
Payer: MEDICARE

## 2024-08-29 DIAGNOSIS — M25.561 ACUTE PAIN OF RIGHT KNEE: ICD-10-CM

## 2024-08-29 DIAGNOSIS — M79.609 STUMP PAIN (H): Primary | ICD-10-CM

## 2024-08-29 DIAGNOSIS — T87.89 STUMP PAIN (H): Primary | ICD-10-CM

## 2024-08-29 NOTE — TELEPHONE ENCOUNTER
Called patient back,    Patient informed of US order.  Patient will call imaging.    Hilario HUERTAS RN

## 2024-08-29 NOTE — TELEPHONE ENCOUNTER
Yes that is what I was suggesting was to do an ultrasound. Ordered. Please let him know.     Joel Wegener,MD w

## 2024-08-29 NOTE — TELEPHONE ENCOUNTER
Left message for patient to call [facility] back  When patient calls back please transfer to an RN  Hilario HUERTAS RN

## 2024-08-29 NOTE — TELEPHONE ENCOUNTER
"Pt calling back, relayed message from provider. Pt states he has already increased gabapentin to 600mg BID himself so wouldn't work and that oxycodone does not touch pain due to how severe it is but he does not have the ability to get a ride to ER due to ride issues and would need to take ambulance which patient states he will not do. He also states he would not be able to come in for an appointment tomorrow due to prior commitments but will not go to ER as he \"has waited multiple hours in ER's previously and he will not wait in his wheelchair that long\".    Also states \"Unless Dr. Wegener is willing to write an order for an ultrasound it will not be looked at via that\".    Please advise,    Thanks!  Jt SINGER RN   [facility]    "

## 2024-08-29 NOTE — TELEPHONE ENCOUNTER
Provider Response to 2nd Level Triage Request    I have reviewed the RN documentation. My recommendation is:  Possibilities include:      Infection - could assess for deeper abscess with an ultrasoud, would recommend lab tests (cbc, esr, crp) to evaluate for inflammation/infection.     2.  Phantom limb pain:  this usually would be fairly constant and burning.  If the oxycodone helps less likely this is phantom limb pain.  If the gabapentin helps this could indicate phantom limb pain.      Could continue to increase gabapentin dose to 600mg twice daily from current 600mg in afternoon and 300mg evening.     Please let me know what he thinks.     3.  I could see  him tomorrow during any of the acute visit time slots.     Joel Wegener,MD

## 2024-08-29 NOTE — TELEPHONE ENCOUNTER
RENATA,   Patient calling to report severe behind the R knee pain for the past 12 days  Reports oxycodone reduces pain for the first hour after administration  Hx of R BKA  Reports area is still warm to the touch and appears normal in color  Denies redness, swelling  States he has to help lift the leg while moving around in wheelchair due to pain  Patient declined going to ED at this time due to transportation issues  Requesting PCP recommendation for best next steps  Please advise  Thanks,  Romelia KLEIN RN    Reason for Disposition   SEVERE pain (e.g., excruciating, unable to walk)    Additional Information   Negative: Sounds like a life-threatening emergency to the triager   Negative: Followed a knee injury   Negative: Swollen knee joint and fever   Negative: Patient sounds very sick or weak to the triager   Negative: Can't move swollen joint at all   Negative: Thigh or calf pain and only 1 side and present > 1 hour   Negative: Thigh or calf swelling and only 1 side    Protocols used: Knee Pain-A-OH

## 2024-08-31 ENCOUNTER — MYC MEDICAL ADVICE (OUTPATIENT)
Dept: FAMILY MEDICINE | Facility: CLINIC | Age: 64
End: 2024-08-31
Payer: MEDICARE

## 2024-08-31 DIAGNOSIS — M79.89 OTHER SPECIFIED SOFT TISSUE DISORDERS: ICD-10-CM

## 2024-08-31 DIAGNOSIS — M25.561 ACUTE PAIN OF RIGHT KNEE: Primary | ICD-10-CM

## 2024-08-31 DIAGNOSIS — T14.8XXD WOUND HEALING, DELAYED: ICD-10-CM

## 2024-08-31 DIAGNOSIS — Z89.511 S/P BELOW KNEE AMPUTATION, RIGHT (H): ICD-10-CM

## 2024-09-03 DIAGNOSIS — R11.0 NAUSEA WITHOUT VOMITING: ICD-10-CM

## 2024-09-03 DIAGNOSIS — K31.84 GASTROPARESIS: ICD-10-CM

## 2024-09-04 ENCOUNTER — TELEPHONE (OUTPATIENT)
Dept: FAMILY MEDICINE | Facility: CLINIC | Age: 64
End: 2024-09-04
Payer: MEDICARE

## 2024-09-04 ENCOUNTER — TELEPHONE (OUTPATIENT)
Dept: PHYSICAL MEDICINE AND REHAB | Facility: CLINIC | Age: 64
End: 2024-09-04
Payer: MEDICARE

## 2024-09-04 ENCOUNTER — MYC MEDICAL ADVICE (OUTPATIENT)
Dept: FAMILY MEDICINE | Facility: CLINIC | Age: 64
End: 2024-09-04
Payer: MEDICARE

## 2024-09-04 DIAGNOSIS — K31.84 GASTROPARESIS: ICD-10-CM

## 2024-09-04 DIAGNOSIS — R11.0 NAUSEA WITHOUT VOMITING: ICD-10-CM

## 2024-09-04 RX ORDER — PROMETHAZINE HYDROCHLORIDE 25 MG/1
TABLET ORAL
Qty: 270 TABLET | Refills: 0 | Status: SHIPPED | OUTPATIENT
Start: 2024-09-04

## 2024-09-04 NOTE — TELEPHONE ENCOUNTER
Retail Pharmacy Prior Authorization Team   Phone: [phone]    PRIOR AUTHORIZATION DENIED    Medication: PROMETHAZINE HCL 25 MG PO TABS  Insurance Company: [employer] [place] - Phone [phone] Fax [phone]  Denial Date: 9/4/2024  Denial Reason(s): MUST HAVE AN FDA APPROVED DX OR COMPENDIA SUPPORTED INDICATION       Appeal Information: IF THE PROVIDER WOULD LIKE TO APPEAL THIS DECISION PLEASE PROVIDE THE PA TEAM WITH A LETTER OF MEDICAL NECESSITY      Patient Notified: NO

## 2024-09-04 NOTE — TELEPHONE ENCOUNTER
Pre-visit phone call-       LPN called pt and left VM. Pt was reminded of their upcoming appointment on Thursday 9/5/24 at 0930 with Dr. Lira in the Amputee clinic.       Pt was reminded of where the clinic was located:     We are located in the [facility] at [address] [place] [address], Suite [facility], [place], [place].  Our building is located across the street from [facility] and offers free parking in our on-site lot.  Please take the stairs or elevator to the second floor of the [facility] and check in at the  located in the Specialty Clinic, [address].   From Sign In Desk:     Department Address: [address] [place] [address], [address] [address]   Department Phone: [phone]     Kathleen Ryan LPN

## 2024-09-05 ENCOUNTER — OFFICE VISIT (OUTPATIENT)
Dept: PHYSICAL MEDICINE AND REHAB | Facility: CLINIC | Age: 64
End: 2024-09-05
Payer: MEDICARE

## 2024-09-05 VITALS — SYSTOLIC BLOOD PRESSURE: 128 MMHG | DIASTOLIC BLOOD PRESSURE: 68 MMHG

## 2024-09-05 DIAGNOSIS — T14.8XXA OPEN WOUND: ICD-10-CM

## 2024-09-05 DIAGNOSIS — Z89.511 S/P BELOW KNEE AMPUTATION, RIGHT (H): Primary | ICD-10-CM

## 2024-09-05 PROCEDURE — 99215 OFFICE O/P EST HI 40 MIN: CPT | Performed by: STUDENT IN AN ORGANIZED HEALTH CARE EDUCATION/TRAINING PROGRAM

## 2024-09-05 ASSESSMENT — PAIN SCALES - GENERAL: PAINLEVEL: MODERATE PAIN (4)

## 2024-09-05 NOTE — PATIENT INSTRUCTIONS
"Wound care supplies should be ordered by your home care agency. Please contact clinic if home care agency is not able to order supplies.         Wound Care Instructions    DAILY and as needed, Cleanse your right BKA wound(s) with Normal saline.    Pat Dry with non-sterile gauze    Apply Lotion to the intact skin surrounding your wound and other dry skin locations. Some good lotions include: Remedy Skin Repair Cream, Sarna, Vanicream or Cetaphil    Primary Dressing: Apply hydrofera blue into/onto the wounds    Secondary dressing: Cover with silicone foam or Zetuvit     Secure with non-sterile roll gauze (4\" x 75\" roll) and tape (1\" roll tape) as needed; avoid adhesive directly on the skin    Compression: tubular compression     It is ok to get your wound wet in the bath or shower      If for some reason you are not able to get your dressing(s) changed as outlined above (due to illness, lack of supplies, lack of help) please do the following: remove old, soiled dressings; wash the wounds with saline; pat dry; apply ABD pad or other absorbant pad and secure with rolled gauze; avoid tape directly on your skin; Call the clinic as soon as possible to let us know what the current issues are in receiving wound care [phone].      SEEK MEDICAL CARE IF:  You have an increase in swelling, pain, or redness around the wound.  You have an increase in the amount of pus coming from the wound.  There is a bad smell coming from the wound.  The wound appears to be worsening/enlarging  You have a fever greater than 101.5 F      It is ok to continue current wound care treatment/products for the next 2-3 days until new wound care supplies are ordered and arrive. If longer than this please contact our office at [phone].    If you have a 2 layer or 4 layer compression wrap on these are safe to have on for ONLY 7 days. If for some reason you are not able to get the wrap(s) changed (due to illness; lack of supplies, lack of help, " lack of transportation) please do the following: unwrap the old 2 or 4 layer compression wrap; avoid using scissors as you could cut your skin and cause wounds; use tubular compression when available. Call to reschedule your home care or clinic visit appointment as soon as possible.    Please NOTE: if you are 15 minutes late to your clinic appointment you will have to be rescheduled. Please call our clinic as soon as possible if you know you will not be able to get to your appointment at [phone].    If you fail to show up to 3 scheduled clinic appointments you will be dismissed from our clinic.              We want to hear from you!  In the next few weeks, you should receive a call or email to complete a survey about your visit at [facility]. Please help us improve your appointment experience by letting us know how we did today. We strive to make your experience good and value any ways in which we could do better.      We value your input and suggestions.    Thank you for choosing the [facility]!           Please wear your compression to each clinic visit.    It is recommended that you elevate your legs throughout the day: approx 2-3 times each day  Elevate them above the level of your heart for 30 min.   Ways to do this:   Lay on the couch or your bed and prop your legs up on pillows   Recline back as far as you can go in your recliner and prop your legs on pillows.     Doing these things will help reduce the edema in your legs.     High Protein Foods  When you have an open ulcer, your bodies protein needs are much higher, so it is recommended eat good sources of protein or take a protein supplement!    Protein Supplements  -Premier Protein  -Ensure  -Boost  -Glucerna, if diabetic    Chicken  -Chicken breast, 3.5oz.-30 grams protein  -Chicken meat, cooked, 4 oz.    Beef  -Hamburger olga, 4 oz-28 grams protein  -Steak, 6 oz-42 grams  -Most cuts of beef- 7 grams of protein  per ounce    Fish  -Most fish fillets or steaks are about 22 grams of protein for 3 1/2 oz(100 grams) of cooked fish, or 6 grams per ounce  -Tuna, 6 oz can-40 grams of protein    Pork  -Pork chop, average-22 grams protein  -Pork loin or tenderloin, 4 oz.-29 grams  -Ham, 3oz serving- 19 grams  -Mendiola, 1 slice-3 grams    Eggs and Dairy  -Egg, large-7 grams  -Milk, 1 cup-8 grams  -Cottage cheese, 1/2 cup-15 grams  -Greek yogurt, 1 cup-usually 8-12 grams, check label    Beans  -Soy milk, 1 cup-6-10 grams  -Most beans(black, cook, lentils, etc.) about 7-10 grams protein per half cup of cooked beans    Nuts and Seeds  -Peanut butter, 2 Tablespoons- 8 grams protein  -Almonds, 1/4 cup- 8 grams  -Peanuts, 1/4 cup-9 grams  -Sunflower seeds, 1/4 cup- 6 grams

## 2024-09-05 NOTE — PATIENT INSTRUCTIONS
I have ordered a prosthesis and physical therapy - you may initiate physical therapy early for a leg strengthening program / phantom limb therapy, then pause on the therapy and resume when the leg is ready for prosthesis fitting.    Aim for 105-130 grams of protein per day from all sources while your wound is healing, and at least 80 after your wound is healed.    Amputation Resources:     Wiggle Your Toes  [email or web address] (Facebook group as well)   Main PH: [phone]  , Victor M Trevino Talha PH: [phone], is available by phone/text 7 days/week (recommend you contact Victor M directly)     &    Amputation Coalition   www.amputee-coalition.org   PH: [phone]  National agency where you can find support groups and find local resources.     Support Groups:   For more information, contact Birgit Pryor PH: [phone], Email: [email or web address]     Balanced Diabetic Amputee Community   Mondays on Zoom @ 7-8:00 pm central time   Come talk about issues related to living with a chronic disease as well as navigating limb loss.     Balanced Wound Care, Charcot Foot, & Partial Amputation Community   The 3rd Tuesday of the month on Zoom @ 10-11:00 am central   Come discuss issues related to living with chronic wound care, Charcot foot, and partial amputations.     Balanced Grief--A NON Therapeutic Look at Grief   1st and 3rd Tuesdays on Zoom @ 10-11:00 am central   Grief is normal learn how to thrive with limb loss-- join the learning and discussion.     [facility] Amputee Community   Tuesdays on Zoom @ 7-8:00 pm central time    Come learn from various professions related to thriving with limb loss.     Balanced and Beyond-- for new amputees   (pre-surgery and beyond)   Wednesdays on Zoom @ 10-11:00 am central   Come discuss issues related to the first years with prosthetics.

## 2024-09-05 NOTE — PROGRESS NOTES
PM&R Clinic Note     Patient Name: Erwin Aguilar : 1960 Medical Record: [id number]     Level of Amputation:  Right transtibial  : Jaren Fruci  Date of Surgery: 24  Etiology: Failed femoral artery stent placement         History of Present Illness:     Erwin Aguilar is a 63 year old male with a PMH of hypertension, HLD, paroxysmal atrial fibrillation, asthma, orsteoarthritis, DM2, GERD, cirrhosis, gastroparesis, anxiety, depression, cauda equina syndrome, and chronic pain from failed back surgery with a spinal cord stimulator.  He has a right transtibial amputation on 24 after failed femoral artery stent placement from chronic nonhealing skin after his right achilles tendon surgery never fully healed.  This was complicated by a wound dehiscence with exposed muscle.  He was admitted on 24 for surgery on 24.  He since saw primary care who noted wound healing with granulation tissue.  Wound consult was ordered for potential wound vac.  He later saw seating OT for an ultralight manual wheelchair.  He later called his PCP for right posterior knee pain, which wasn't improved with increased gabapentin.  Oxycodone was also not helpful.  His PCP ordered an ultrasound.    On talking with the prosthetist, prior to the amputation he was very active.  He was bicycling, hiking, and doing a lot of physical activity until 2024 on the day of his vascular surgery.  He was in the process of moving and was still riding his bike up to that time, including to all his doctor's appointments.  His walking was limited to  feet prior to his amputation because his pain got so bad.  He recommends a K3 classification.    He lives in a 2 bedroom apartment, which has a slope leading to the steps / ramp to get in the apartment.  The apartment is all on the same level and has an open floor plan.  The main areas are hardwood but have carpet in the bedroom.  He is taking care of his partner who is  disabled.  He needs to several home maintenance tasks in the house.  He is used to using his wheelchair within the house for ADLs.   Since the amputation, he's been exercising going up hills using his wheelchair for regular upper body exercise.    The patient says that his wound dehiscence has continued to improve and has been healing well with medihoney.  He thinks it will heal in the next month, and he's seeing Dr. Up on Monday to discuss initiating a wound vac.  His pain has gone down significantly, 90% since he had his amputation.  For the pain behind his right knee, which is persistent, his primary doctor ordered an ultrasound.  He's afraid that his stents may have failed and wants to rule that out.    He wants to be more active after fitting the prosthesis.  He wants to be able to golf again.    He has lost weight over the past spring:  Wt Readings from Last 4 Encounters:   08/05/24 88.1 kg (194 lb 4.8 oz)   07/23/24 91.6 kg (202 lb)   06/04/24 83.9 kg (185 lb)   05/09/24 92 kg (202 lb 13.2 oz)   He also feels like his blood pressure has gotten better.    Past Medical History:   Diagnosis Date    Actinic keratosis     aldara    Anxiety 12/1997    Cancer (H)     squamous cell skin CA    Cauda equina spinal cord injury (H)     Chronic sinusitis 05/01/2016    Cirrhosis of liver (H) 04/01/2014    Not biopsy-proven as of 4/1/14.      Depressive disorder     Diastasis recti     Esophageal reflux     Esophageal varices in cirrhosis (H) 04/01/2014    Hospitalized for UGI blee 3/28/14, endoscopy revealed bleeding varices.    Essential hypertension, benign     Intermittent asthma     Mild depression     Mixed hyperlipidemia     Nasal polyps 05/01/2016    Osteoarthritis of lumbar spine, unspecified spinal osteoarthritis complication status     Other chronic pain     Paroxysmal atrial fibrillation (H) 09/22/2021    SCCA (squamous cell carcinoma) of skin     Seasonal allergic conjunctivitis     Type II or  unspecified type diabetes mellitus without mention of complication, not stated as uncontrolled     Unspecified site of spinal cord injury without evidence of spinal bone injury     due to back surgery     Past Surgical History:   Procedure Laterality Date    ABDOMEN SURGERY  2014    AMPUTATE LEG BELOW KNEE Right 5/9/2024    Procedure: Right below knee amputation (transtibial amputation);  Surgeon: Kurtis Nye MD;  Location: [facility]    ANGIOGRAM Right 4/23/2024    Procedure: Ultrasound guided percutaneous transarterial left common femoral artery access, diagnostic aortoiliac angiogram, selective cannulation of right comon iliac, righ external iliac, right common femoral, and right superficial femoral artery, balloon angioplasty of right superficial femoral artery, 6mm x 12 cm self-expanding drug-coated stent placement of right superficial femoral artery, lef    BACK SURGERY  August 2009    COLONOSCOPY N/A 5/12/2016    Procedure: COMBINED COLONOSCOPY, SINGLE OR MULTIPLE BIOPSY/POLYPECTOMY BY BIOPSY;  Surgeon: Ana Paula Urbina MD;  Location: [facility]    DECOMPRESSION CUBITAL TUNNEL Left 8/31/2023    Procedure: LEFT CUBITAL TUNNEL RELEASE,;  Surgeon: Deny Dixon MD;  [place] [facility] [place]    ENDOSCOPY UPPER, COLONOSCOPY, COMBINED  10/19/2011    Procedure:COMBINED ENDOSCOPY UPPER, COLONOSCOPY; Upper Endoscopy, Colonoscopy with Polypectomy x4; Surgeon:AMBAR RODRÍGUEZIQ; Location:[facility]    ENT SURGERY  1-2016    Ongoing since then    ESOPHAGOSCOPY, GASTROSCOPY, DUODENOSCOPY (EGD), COMBINED  3/28/2014    Procedure: COMBINED ESOPHAGOSCOPY, GASTROSCOPY, DUODENOSCOPY (EGD);  EGD, Gastric Biopsies, Esophageal Banding;  Surgeon: Reyna Tovar MD;  [facility]: [facility]    ESOPHAGOSCOPY, GASTROSCOPY, DUODENOSCOPY (EGD), COMBINED  6/9/2014    Procedure: COMBINED ESOPHAGOSCOPY, GASTROSCOPY, DUODENOSCOPY (EGD);  Surgeon: Curtis Mendez MD;  Location:  GI    ESOPHAGOSCOPY, GASTROSCOPY, DUODENOSCOPY (EGD), COMBINED  7/24/2014     Procedure: COMBINED ESOPHAGOSCOPY, GASTROSCOPY, DUODENOSCOPY (EGD);  Surgeon: Gerard Samaniego MD;  [facility]: [facility]    ESOPHAGOSCOPY, GASTROSCOPY, DUODENOSCOPY (EGD), COMBINED N/A 10/31/2014    Procedure: COMBINED ESOPHAGOSCOPY, GASTROSCOPY, DUODENOSCOPY (EGD);  Surgeon: Gerard Samaniego MD;  [facility]    ESOPHAGOSCOPY, GASTROSCOPY, DUODENOSCOPY (EGD), COMBINED N/A 5/12/2016    Procedure: COMBINED ESOPHAGOSCOPY, GASTROSCOPY, DUODENOSCOPY (EGD);  Surgeon: Ana Paula Urbina MD;  [facility]: [facility]    ESOPHAGOSCOPY, GASTROSCOPY, DUODENOSCOPY (EGD), COMBINED N/A 8/2/2018    Procedure: COMBINED ESOPHAGOSCOPY, GASTROSCOPY, DUODENOSCOPY (EGD);  EGD;  Surgeon: Yu Wagner MD;  Location: [facility]    HCL SQUAMOUS CELL CARCINOMA AG  10/07    left forearm    HERNIORRHAPHY UMBILICAL  11/8/2012    Procedure: HERNIORRHAPHY UMBILICAL;  Open Umbilical Hernia Repair With Mesh ;  Surgeon: Chase Nicholson MD;  Location: [facility]    INSERT STIMULATOR DORSAL COLUMN N/A 6/28/2018    Procedure: INSERT STIMULATOR DORSAL COLUMN;;  Surgeon: Elvis Sauceda MD;  [facility]    IR LOWER EXTREMITY ANGIOGRAM RIGHT  4/23/2024    neuro stimulator  2010    RELEASE CARPAL TUNNEL Left 8/31/2023    Procedure: LEFT OPEN CARPAL TUNNEL RELEASE,;  Surgeon: Deny Dixon MD;  Location: [facility]    RELEASE TRIGGER FINGER Left 8/31/2023    Procedure: Release left trigger finger thumb;  Surgeon: Deny Dixon MD;  Location: [facility] OR    REMOVE GENERATOR STIMULATOR (LOCATION) N/A 6/28/2018    Procedure: REMOVE GENERATOR STIMULATOR (LOCATION);  Spinal Cord Stimulator and IPG Explant and Re-Implant of SCS System (Leads and IPG);  Surgeon: Elvis Sauceda MD;  [facility]    REPAIR TENDON ACHILLES Right 11/11/2020    Procedure: Right achilles debridement and partial calcaneus excision;  Surgeon: Eh Sandoval MD;  Location: [facility] OR    SURGICAL HISTORY OF -   1/02    abcess  tooth    SURGICAL HISTORY OF -       L4-5 laminectomy, cauda equina syndrome    SURGICAL HISTORY OF -   +    herniated disk repair    TONSILLECTOMY  10 1964    TRANSPOSITION ULNAR NERVE (ELBOW)      right    ZZC APPENDECTOMY         Social History     Tobacco Use    Smoking status: Former     Current packs/day: 0.00     Types: Cigarettes     Start date: 10/13/1983     Quit date: 1984     Years since quittin.2     Passive exposure: Past    Smokeless tobacco: Never   Substance Use Topics    Alcohol use: Not Currently     Comment: - last drink was 3/28/14     Family History   Problem Relation Age of Onset    Cancer Mother         lung    Breast Cancer Mother     Other Cancer Mother     Cancer Father         esophogeal, GERD    Snoring Father     Diabetes Brother         amputation, Type 1    Diabetes Brother     Diabetes Brother     Cancer - colorectal No family hx of     Glaucoma No family hx of     Macular Degeneration No family hx of     Anesthesia Reaction No family hx of     Venous thrombosis No family hx of             Medications:     Current Outpatient Medications   Medication Sig Dispense Refill    albuterol (PROAIR HFA/PROVENTIL HFA/VENTOLIN HFA) 108 (90 Base) MCG/ACT inhaler INHALE 2 PUFFS BY MOUTH EVERY 6 HOURS AS NEEDED FOR SHORTNESS OF BREATH OR WHEEZING (Patient taking differently: 2 puffs every 6 hours as needed INHALE 2 PUFFS BY MOUTH EVERY 6 HOURS AS NEEDED FOR SHORTNESS OF BREATH OR WHEEZING) 18 g 1    apixaban ANTICOAGULANT (ELIQUIS ANTICOAGULANT) 5 MG tablet Take 1 tablet (5 mg) by mouth 2 times daily 180 tablet 3    aspirin 81 MG EC tablet Take 1 tablet (81 mg) by mouth daily 84 tablet 0    baclofen (LIORESAL) 10 MG tablet Take 2 tablets (20 mg) by mouth 3 times daily. 360 tablet 3    Continuous Blood Gluc  (FREESTYLE CACHORRO 2 READER) GASTON USE TO READ BLOOD SUGARS AS PER 'S INSTRUCTIONS (Patient not taking: Reported on 2024) 1 each 0    doxazosin (CARDURA) 8  MG tablet Take 1 tablet (8 mg) by mouth at bedtime 90 tablet 1    gabapentin (NEURONTIN) 300 MG capsule Take 600 mg by mouth in the afternoon and 300 mg at bedtime 270 capsule 3    hydrOXYzine HCl (ATARAX) 25 MG tablet Take 1 tablet (25 mg) by mouth every 6 hours as needed for other (adjuvant pain) (Patient not taking: Reported on 6/4/2024) 30 tablet 0    insulin glargine (LANTUS SOLOSTAR) 100 UNIT/ML pen INJECT 26 UNITS SUBCUTANEOUSLY AT BEDTIME (Patient taking differently: 26 Units at bedtime INJECT 26 UNITS SUBCUTANEOUSLY AT BEDTIME) 15 mL 1    insulin pen needle (GLOBAL EASE INJECT PEN NEEDLES) 32G X 4 MM miscellaneous USE AS DIRECTED EVERY  each 1    Lidocaine (LIDOCARE) 4 % Patch Place 1 patch onto the skin every 24 hours To prevent lidocaine toxicity, patient should be patch free for 12 hrs daily. (Patient not taking: Reported on 6/4/2024) 10 patch 0    lidocaine (LMX4) 4 % external cream Apply topically once as needed for mild pain (Patient not taking: Reported on 6/4/2024) 28 g 0    medical cannabis (Patient's own supply) See Admin Instructions (The purpose of this order is to document that the patient reports taking medical cannabis.  This is not a prescription, and is not used to certify that the patient has a qualifying medical condition.)   Flower - PRN      methocarbamol (ROBAXIN) 500 MG tablet Take 1 tablet (500 mg) by mouth 4 times daily 120 tablet 0    oxyCODONE (ROXICODONE) 5 MG tablet Take 1 tablet (5 mg) by mouth 5 times daily As needed for post op pain in addition to 10mg three times daily scheduled. (Patient not taking: Reported on 8/5/2024) 30 tablet 0    oxyCODONE IR (ROXICODONE) 10 MG tablet Take 1 tablet (10 mg) by mouth 3 times daily as needed for severe pain. Do not start before August 30, 2024. 90 tablet 0    promethazine (PHENERGAN) 25 MG tablet TAKE 1 TABLET BY MOUTH 3 TIMES A DAY AS NEEDED FOR NAUSEA 270 tablet 0    propranolol (INDERAL) 40 MG tablet Take 1 tablet (40 mg) by  "mouth 3 times daily 270 tablet 2    rosuvastatin (CRESTOR) 40 MG tablet Take 1 tablet (40 mg) by mouth daily 90 tablet 3    sertraline (ZOLOFT) 100 MG tablet TAKE 2 TABLETS BY MOUTH DAILY 180 tablet 2    triamterene-HCTZ (DYAZIDE) 37.5-25 MG capsule Take 1 capsule by mouth every morning 90 capsule 1            Allergies:     Allergies   Allergen Reactions    Levaquin Anaphylaxis and Rash     Swelling in lip and tongue felt thick    Lisinopril Anaphylaxis     Swollen tongue; inability to swallow; drooling; hives; swollen face, neck, angioedema    Acetaminophen      Hx of cirrhosis     Amlodipine      Swelling, hives, possible angioedema      Morphine      b/p dropped and arms went numb  Hypotension    Quinolones Hives    Spironolactone Unknown     Pt believes himself to be allergic to it; cannot find his old records of this.-mjc     Bactrim [Sulfamethoxazole-Trimethoprim] Rash              ROS:     A focused ROS is negative other than the symptoms noted above in the HPI.           Physical Examination:     VITAL SIGNS: There were no vitals taken for this visit.  BMI: Estimated body mass index is 27.88 kg/m  as calculated from the following:    Height as of 7/23/24: 1.778 m (5' 10\").    Weight as of 8/5/24: 88.1 kg (194 lb 4.8 oz).  Gait: Deferred  Strength:   Hip adduction Hip Flexion Knee Extension A. Plantarflex A. Dorsiflex   R 5 5 5 NA NA   L 5 5 5 5 5   Sensation:  Sensation to light touch diminished in bilateral lower extremities  Residual right leg: About a 1.5cm wide cavity wound at greatest diameter with rounded edges and evidence of skin growth from second intention into the wound bed (covered with product) best on anterior inferior edge.  No evidence of undermining or tunneling.  Skin around wound looks healthy with no erythema.  No tenderness in popliteal fossa.  Left Leg: No deformity, erythema, or skin breakdown.         Labs:     Lipids  Recent Labs   Lab Test 04/23/24  1155 01/09/24  0932 " 10/21/22  1006 02/12/22  1113   CHOL 143 177 176 161   LDL 70 113* 105* 86   HDL 60 43 47 36*   TRIG 65 105 120 194*     HgbA1c  Recent Labs   Lab Test 05/03/24  1105 01/09/24  0932 08/10/23  0710 05/01/23  1351   A1C 5.9* 5.9* 6.7* 5.8*            Assessment/Plan:     Erwin Aguilar is a 63 year old male with a relevant PMH of osteoarthritis, DM2, cirrhosis, cauda equina syndrome, and chronic pain who had a right transtibial amputation on 5/9/24 due to peripheral vascular disease.  This was complicated by wound dehiscence.    #Right TTA  -------------  Medical Necessity for Prosthesis    Erwin Aguilar is a 63 year old male with no cardiopulmonary, MSK or neurologic diagnosis that would limit him from successful ambulation with a lower extremity prosthesis.   He sustained a right transtibial amputation on 5/9/24 due to peripheral vascular disease.  His post operative  course was complicated by deep wound dehiscence that is rapidly healing through second intention.  His prognosis for prosthesis use is excellent.    He is currently utilizing a wheelchair for locomotion and ADLs.  He is independent with all transfers and is keeping up with rigorous home exercise.  I anticipate that he will not have limiting issues with the increased energy requirements of using a prosthesis and will have a low risk of balance disorders or falls with a high functioning prosthesis.    His functional level prior to amputation was K3  His expected functional potential: K3    He has expressed a desire to ambulate with a prosthesis, is motivated to go through the fitting process, and will participate in prosthesis training.  The residual limb is in good condition and the incision is well healed with no open wound, drainage, erythema, swelling or odor.   I am recommending a new lower extremity prosthesis be fabricated.   -------------  - ordered right lower extremity prosthesis, with fitting to be initiated once wound healing completes and  he has a few weeks of  fitting  - physical therapy to be initiated during prosthesis fitting.  Can also use 1-3 sessions prior to develop a lower extremity strengthening HEP while wound healing    #Follow up  - 3 months    Roland Lira MD  Physical Medicine & Rehabilitation    I spent a total of 52 minutes on the date of service doing chart review, history and exam, documentation, and further activities as noted above.

## 2024-09-05 NOTE — LETTER
2024       RE: Erwin Aguilar  [address]  [place] [address]     Dear Colleague,    Thank you for referring your patient, Erwin Aguilar, to the [facility] [place] at [facility]. Please see a copy of my visit note below.           PM&R Clinic Note     Patient Name: Erwin Aguilar : 1960 Medical Record: [id number]     Level of Amputation:  Right transtibial  : Jaren Fruci  Date of Surgery: 24  Etiology: Failed femoral artery stent placement         History of Present Illness:     Erwin Aguilar is a 63 year old male with a PMH of hypertension, HLD, paroxysmal atrial fibrillation, asthma, orsteoarthritis, DM2, GERD, cirrhosis, gastroparesis, anxiety, depression, cauda equina syndrome, and chronic pain from failed back surgery with a spinal cord stimulator.  He has a right transtibial amputation on 24 after failed femoral artery stent placement from chronic nonhealing skin after his right achilles tendon surgery never fully healed.  This was complicated by a wound dehiscence with exposed muscle.  He was admitted on 24 for surgery on 24.  He since saw primary care who noted wound healing with granulation tissue.  Wound consult was ordered for potential wound vac.  He later saw seating OT for an ultralight manual wheelchair.  He later called his PCP for right posterior knee pain, which wasn't improved with increased gabapentin.  Oxycodone was also not helpful.  His PCP ordered an ultrasound.    On talking with the prosthetist, prior to the amputation he was very active.  He was bicycling, hiking, and doing a lot of physical activity until 2024 on the day of his vascular surgery.  He was in the process of moving and was still riding his bike up to that time, including to all his doctor's appointments.  His walking was limited to  feet prior to his amputation because  his pain got so bad.  He recommends a K3 classification.    He lives in a 2 bedroom apartment, which has a slope leading to the steps / ramp to get in the apartment.  The apartment is all on the same level and has an open floor plan.  The main areas are hardwood but have carpet in the bedroom.  He is taking care of his partner who is disabled.  He needs to several home maintenance tasks in the house.  He is used to using his wheelchair within the house for ADLs.   Since the amputation, he's been exercising going up hills using his wheelchair for regular upper body exercise.    The patient says that his wound dehiscence has continued to improve and has been healing well with medihoney.  He thinks it will heal in the next month, and he's seeing Dr. Up on Monday to discuss initiating a wound vac.  His pain has gone down significantly, 90% since he had his amputation.  For the pain behind his right knee, which is persistent, his primary doctor ordered an ultrasound.  He's afraid that his stents may have failed and wants to rule that out.    He wants to be more active after fitting the prosthesis.  He wants to be able to golf again.    He has lost weight over the past spring:  Wt Readings from Last 4 Encounters:   08/05/24 88.1 kg (194 lb 4.8 oz)   07/23/24 91.6 kg (202 lb)   06/04/24 83.9 kg (185 lb)   05/09/24 92 kg (202 lb 13.2 oz)   He also feels like his blood pressure has gotten better.    Past Medical History:   Diagnosis Date     Actinic keratosis     aldara     Anxiety 12/1997     Cancer (H)     squamous cell skin CA     Cauda equina spinal cord injury (H)      Chronic sinusitis 05/01/2016     Cirrhosis of liver (H) 04/01/2014    Not biopsy-proven as of 4/1/14.       Depressive disorder      Diastasis recti      Esophageal reflux      Esophageal varices in cirrhosis (H) 04/01/2014    Hospitalized for UGI blee 3/28/14, endoscopy revealed bleeding varices.     Essential hypertension, benign       Intermittent asthma      Mild depression      Mixed hyperlipidemia      Nasal polyps 05/01/2016     Osteoarthritis of lumbar spine, unspecified spinal osteoarthritis complication status      Other chronic pain      Paroxysmal atrial fibrillation (H) 09/22/2021     SCCA (squamous cell carcinoma) of skin      Seasonal allergic conjunctivitis      Type II or unspecified type diabetes mellitus without mention of complication, not stated as uncontrolled      Unspecified site of spinal cord injury without evidence of spinal bone injury     due to back surgery     Past Surgical History:   Procedure Laterality Date     ABDOMEN SURGERY  2014     AMPUTATE LEG BELOW KNEE Right 5/9/2024    Procedure: Right below knee amputation (transtibial amputation);  Surgeon: Kurtis Nye MD;  Location: [facility] OR     ANGIOGRAM Right 4/23/2024    Procedure: Ultrasound guided percutaneous transarterial left common femoral artery access, diagnostic aortoiliac angiogram, selective cannulation of right comon iliac, righ external iliac, right common femoral, and right superficial femoral artery, balloon angioplasty of right superficial femoral artery, 6mm x 12 cm self-expanding drug-coated stent placement of right superficial femoral artery, lef     BACK SURGERY  August 2009     COLONOSCOPY N/A 5/12/2016    Procedure: COMBINED COLONOSCOPY, SINGLE OR MULTIPLE BIOPSY/POLYPECTOMY BY BIOPSY;  Surgeon: Ana Paula Urbina MD;  [facility]: [facility]     DECOMPRESSION CUBITAL TUNNEL Left 8/31/2023    Procedure: LEFT CUBITAL TUNNEL RELEASE,;  Surgeon: Deny Dixon MD;  Location: [facility] OR     ENDOSCOPY UPPER, COLONOSCOPY, COMBINED  10/19/2011    Procedure:COMBINED ENDOSCOPY UPPER, COLONOSCOPY; Upper Endoscopy, Colonoscopy with Polypectomy x4; Surgeon:AMBAR RODRÍGUEZIQ; Location:[facility] OR     ENT SURGERY  1-2016    Ongoing since then     ESOPHAGOSCOPY, GASTROSCOPY, DUODENOSCOPY (EGD), COMBINED  3/28/2014    Procedure: COMBINED ESOPHAGOSCOPY, GASTROSCOPY,  DUODENOSCOPY (EGD);  EGD, Gastric Biopsies, Esophageal Banding;  Surgeon: Reyna Tovar MD;  [facility]     ESOPHAGOSCOPY, GASTROSCOPY, DUODENOSCOPY (EGD), COMBINED  6/9/2014    Procedure: COMBINED ESOPHAGOSCOPY, GASTROSCOPY, DUODENOSCOPY (EGD);  Surgeon: Curtis Mendez MD;  [facility]: [facility]     ESOPHAGOSCOPY, GASTROSCOPY, DUODENOSCOPY (EGD), COMBINED  7/24/2014    Procedure: COMBINED ESOPHAGOSCOPY, GASTROSCOPY, DUODENOSCOPY (EGD);  Surgeon: Gerard Samaniego MD;  [facility]     ESOPHAGOSCOPY, GASTROSCOPY, DUODENOSCOPY (EGD), COMBINED N/A 10/31/2014    Procedure: COMBINED ESOPHAGOSCOPY, GASTROSCOPY, DUODENOSCOPY (EGD);  Surgeon: Gerard Samaniego MD;  [facility]     ESOPHAGOSCOPY, GASTROSCOPY, DUODENOSCOPY (EGD), COMBINED N/A 5/12/2016    Procedure: COMBINED ESOPHAGOSCOPY, GASTROSCOPY, DUODENOSCOPY (EGD);  Surgeon: Ana Paula Urbina MD;  [facility]     ESOPHAGOSCOPY, GASTROSCOPY, DUODENOSCOPY (EGD), COMBINED N/A 8/2/2018    Procedure: COMBINED ESOPHAGOSCOPY, GASTROSCOPY, DUODENOSCOPY (EGD);  EGD;  Surgeon: Yu Wagner MD;  [facility]     HCL SQUAMOUS CELL CARCINOMA AG  10/07    left forearm     HERNIORRHAPHY UMBILICAL  11/8/2012    Procedure: HERNIORRHAPHY UMBILICAL;  Open Umbilical Hernia Repair With Mesh ;  Surgeon: Chase Nicholson MD;  Location: [facility]     INSERT STIMULATOR DORSAL COLUMN N/A 6/28/2018    Procedure: INSERT STIMULATOR DORSAL COLUMN;;  Surgeon: Elvis Sauceda MD;  [facility]     IR LOWER EXTREMITY ANGIOGRAM RIGHT  4/23/2024     neuro stimulator  2010     RELEASE CARPAL TUNNEL Left 8/31/2023    Procedure: LEFT OPEN CARPAL TUNNEL RELEASE,;  Surgeon: Deny Dixon MD;  Location: [facility] OR     RELEASE TRIGGER FINGER Left 8/31/2023    Procedure: Release left trigger finger thumb;  Surgeon: Deny Dixon MD;  Location: [facility] OR     REMOVE GENERATOR STIMULATOR (LOCATION) N/A 6/28/2018    Procedure: REMOVE GENERATOR  STIMULATOR (LOCATION);  Spinal Cord Stimulator and IPG Explant and Re-Implant of SCS System (Leads and IPG);  Surgeon: Elvis Sauceda MD;  [facility]     REPAIR TENDON ACHILLES Right 2020    Procedure: Right achilles debridement and partial calcaneus excision;  Surgeon: Eh Sandoval MD;  Location: [facility] OR     SURGICAL HISTORY OF -       abcess tooth     SURGICAL HISTORY OF -       L4-5 laminectomy, cauda equina syndrome     SURGICAL HISTORY OF -   +    herniated disk repair     TONSILLECTOMY  10 1964     TRANSPOSITION ULNAR NERVE (ELBOW)      right     ZZC APPENDECTOMY  1974       Social History     Tobacco Use     Smoking status: Former     Current packs/day: 0.00     Types: Cigarettes     Start date: 10/13/1983     Quit date: 1984     Years since quittin.2     Passive exposure: Past     Smokeless tobacco: Never   Substance Use Topics     Alcohol use: Not Currently     Comment: - last drink was 3/28/14     Family History   Problem Relation Age of Onset     Cancer Mother         lung     Breast Cancer Mother      Other Cancer Mother      Cancer Father         esophogeal, GERD     Snoring Father      Diabetes Brother         amputation, Type 1     Diabetes Brother      Diabetes Brother      Cancer - colorectal No family hx of      Glaucoma No family hx of      Macular Degeneration No family hx of      Anesthesia Reaction No family hx of      Venous thrombosis No family hx of             Medications:     Current Outpatient Medications   Medication Sig Dispense Refill     albuterol (PROAIR HFA/PROVENTIL HFA/VENTOLIN HFA) 108 (90 Base) MCG/ACT inhaler INHALE 2 PUFFS BY MOUTH EVERY 6 HOURS AS NEEDED FOR SHORTNESS OF BREATH OR WHEEZING (Patient taking differently: 2 puffs every 6 hours as needed INHALE 2 PUFFS BY MOUTH EVERY 6 HOURS AS NEEDED FOR SHORTNESS OF BREATH OR WHEEZING) 18 g 1     apixaban ANTICOAGULANT (ELIQUIS ANTICOAGULANT) 5 MG tablet Take 1 tablet (5  mg) by mouth 2 times daily 180 tablet 3     aspirin 81 MG EC tablet Take 1 tablet (81 mg) by mouth daily 84 tablet 0     baclofen (LIORESAL) 10 MG tablet Take 2 tablets (20 mg) by mouth 3 times daily. 360 tablet 3     Continuous Blood Gluc  (FREESTYLE CACHORRO 2 READER) GASTON USE TO READ BLOOD SUGARS AS PER 'S INSTRUCTIONS (Patient not taking: Reported on 6/4/2024) 1 each 0     doxazosin (CARDURA) 8 MG tablet Take 1 tablet (8 mg) by mouth at bedtime 90 tablet 1     gabapentin (NEURONTIN) 300 MG capsule Take 600 mg by mouth in the afternoon and 300 mg at bedtime 270 capsule 3     hydrOXYzine HCl (ATARAX) 25 MG tablet Take 1 tablet (25 mg) by mouth every 6 hours as needed for other (adjuvant pain) (Patient not taking: Reported on 6/4/2024) 30 tablet 0     insulin glargine (LANTUS SOLOSTAR) 100 UNIT/ML pen INJECT 26 UNITS SUBCUTANEOUSLY AT BEDTIME (Patient taking differently: 26 Units at bedtime INJECT 26 UNITS SUBCUTANEOUSLY AT BEDTIME) 15 mL 1     insulin pen needle (GLOBAL EASE INJECT PEN NEEDLES) 32G X 4 MM miscellaneous USE AS DIRECTED EVERY  each 1     Lidocaine (LIDOCARE) 4 % Patch Place 1 patch onto the skin every 24 hours To prevent lidocaine toxicity, patient should be patch free for 12 hrs daily. (Patient not taking: Reported on 6/4/2024) 10 patch 0     lidocaine (LMX4) 4 % external cream Apply topically once as needed for mild pain (Patient not taking: Reported on 6/4/2024) 28 g 0     medical cannabis (Patient's own supply) See Admin Instructions (The purpose of this order is to document that the patient reports taking medical cannabis.  This is not a prescription, and is not used to certify that the patient has a qualifying medical condition.)   Flower - PRN       methocarbamol (ROBAXIN) 500 MG tablet Take 1 tablet (500 mg) by mouth 4 times daily 120 tablet 0     oxyCODONE (ROXICODONE) 5 MG tablet Take 1 tablet (5 mg) by mouth 5 times daily As needed for post op pain in addition to  "10mg three times daily scheduled. (Patient not taking: Reported on 8/5/2024) 30 tablet 0     oxyCODONE IR (ROXICODONE) 10 MG tablet Take 1 tablet (10 mg) by mouth 3 times daily as needed for severe pain. Do not start before August 30, 2024. 90 tablet 0     promethazine (PHENERGAN) 25 MG tablet TAKE 1 TABLET BY MOUTH 3 TIMES A DAY AS NEEDED FOR NAUSEA 270 tablet 0     propranolol (INDERAL) 40 MG tablet Take 1 tablet (40 mg) by mouth 3 times daily 270 tablet 2     rosuvastatin (CRESTOR) 40 MG tablet Take 1 tablet (40 mg) by mouth daily 90 tablet 3     sertraline (ZOLOFT) 100 MG tablet TAKE 2 TABLETS BY MOUTH DAILY 180 tablet 2     triamterene-HCTZ (DYAZIDE) 37.5-25 MG capsule Take 1 capsule by mouth every morning 90 capsule 1            Allergies:     Allergies   Allergen Reactions     Levaquin Anaphylaxis and Rash     Swelling in lip and tongue felt thick     Lisinopril Anaphylaxis     Swollen tongue; inability to swallow; drooling; hives; swollen face, neck, angioedema     Acetaminophen      Hx of cirrhosis      Amlodipine      Swelling, hives, possible angioedema       Morphine      b/p dropped and arms went numb  Hypotension     Quinolones Hives     Spironolactone Unknown     Pt believes himself to be allergic to it; cannot find his old records of this.-mjc      Bactrim [Sulfamethoxazole-Trimethoprim] Rash              ROS:     A focused ROS is negative other than the symptoms noted above in the HPI.           Physical Examination:     VITAL SIGNS: There were no vitals taken for this visit.  BMI: Estimated body mass index is 27.88 kg/m  as calculated from the following:    Height as of 7/23/24: 1.778 m (5' 10\").    Weight as of 8/5/24: 88.1 kg (194 lb 4.8 oz).  Gait: Deferred  Strength:   Hip adduction Hip Flexion Knee Extension A. Plantarflex A. Dorsiflex   R 5 5 5 NA NA   L 5 5 5 5 5   Sensation:  Sensation to light touch diminished in bilateral lower extremities  Residual right leg: About a 1.5cm wide cavity " wound at greatest diameter with rounded edges and evidence of skin growth from second intention into the wound bed (covered with product) best on anterior inferior edge.  No evidence of undermining or tunneling.  Skin around wound looks healthy with no erythema.  No tenderness in popliteal fossa.  Left Leg: No deformity, erythema, or skin breakdown.         Labs:     Lipids  Recent Labs   Lab Test 04/23/24  1155 01/09/24  0932 10/21/22  1006 02/12/22  1113   CHOL 143 177 176 161   LDL 70 113* 105* 86   HDL 60 43 47 36*   TRIG 65 105 120 194*     HgbA1c  Recent Labs   Lab Test 05/03/24  1105 01/09/24  0932 08/10/23  0710 05/01/23  1351   A1C 5.9* 5.9* 6.7* 5.8*            Assessment/Plan:     Erwin Aguilar is a 63 year old male with a relevant PMH of osteoarthritis, DM2, cirrhosis, cauda equina syndrome, and chronic pain who had a right transtibial amputation on 5/9/24 due to peripheral vascular disease.  This was complicated by wound dehiscence.    #Right TTA  -------------  Medical Necessity for Prosthesis    Erwin Aguilar is a 63 year old male with no cardiopulmonary, MSK or neurologic diagnosis that would limit him from successful ambulation with a lower extremity prosthesis.   He sustained a right transtibial amputation on 5/9/24 due to peripheral vascular disease.  His post operative  course was complicated by deep wound dehiscence that is rapidly healing through second intention.  His prognosis for prosthesis use is excellent.    He is currently utilizing a wheelchair for locomotion and ADLs.  He is independent with all transfers and is keeping up with rigorous home exercise.  I anticipate that he will not have limiting issues with the increased energy requirements of using a prosthesis and will have a low risk of balance disorders or falls with a high functioning prosthesis.    His functional level prior to amputation was K3  His expected functional potential: K3    He has expressed a desire to ambulate  with a prosthesis, is motivated to go through the fitting process, and will participate in prosthesis training.  The residual limb is in good condition and the incision is well healed with no open wound, drainage, erythema, swelling or odor.   I am recommending a new lower extremity prosthesis be fabricated.   -------------  - ordered right lower extremity prosthesis, with fitting to be initiated once wound healing completes and he has a few weeks of  fitting  - physical therapy to be initiated during prosthesis fitting.  Can also use 1-3 sessions prior to develop a lower extremity strengthening HEP while wound healing    #Follow up  - 3 months    Roland Lira MD  Physical Medicine & Rehabilitation    I spent a total of 52 minutes on the date of service doing chart review, history and exam, documentation, and further activities as noted above.      Again, thank you for allowing me to participate in the care of your patient.      Sincerely,    Roland Lira MD

## 2024-09-06 RX ORDER — ONDANSETRON 4 MG/1
4 TABLET, FILM COATED ORAL 2 TIMES DAILY PRN
Qty: 180 TABLET | Refills: 3 | Status: SHIPPED | OUTPATIENT
Start: 2024-09-06

## 2024-09-09 ENCOUNTER — OFFICE VISIT (OUTPATIENT)
Dept: VASCULAR SURGERY | Facility: CLINIC | Age: 64
End: 2024-09-09
Attending: FAMILY MEDICINE
Payer: MEDICARE

## 2024-09-09 ENCOUNTER — TELEPHONE (OUTPATIENT)
Dept: VASCULAR SURGERY | Facility: CLINIC | Age: 64
End: 2024-09-09

## 2024-09-09 VITALS — DIASTOLIC BLOOD PRESSURE: 71 MMHG | SYSTOLIC BLOOD PRESSURE: 148 MMHG | HEART RATE: 47 BPM | OXYGEN SATURATION: 99 %

## 2024-09-09 DIAGNOSIS — T81.89XA NON-HEALING SURGICAL WOUND, INITIAL ENCOUNTER: Primary | ICD-10-CM

## 2024-09-09 DIAGNOSIS — T14.8XXA OPEN WOUND: ICD-10-CM

## 2024-09-09 DIAGNOSIS — Z89.511 S/P BELOW KNEE AMPUTATION, RIGHT (H): ICD-10-CM

## 2024-09-09 PROCEDURE — 99204 OFFICE O/P NEW MOD 45 MIN: CPT | Mod: 25 | Performed by: FAMILY MEDICINE

## 2024-09-09 PROCEDURE — G0463 HOSPITAL OUTPT CLINIC VISIT: HCPCS | Mod: 25 | Performed by: FAMILY MEDICINE

## 2024-09-09 PROCEDURE — 11042 DBRDMT SUBQ TIS 1ST 20SQCM/<: CPT | Performed by: FAMILY MEDICINE

## 2024-09-09 ASSESSMENT — PAIN SCALES - GENERAL: PAINLEVEL: MODERATE PAIN (5)

## 2024-09-09 NOTE — PROGRESS NOTES
Compression Applied to the Left Leg: None    Tubular compression was applied from base of toes to just below the bend of knee    Compression Applied to the Right Leg: Tubular compression size F

## 2024-09-09 NOTE — TELEPHONE ENCOUNTER
----- Message from Sarah GARNER sent at 9/9/2024  9:02 AM CDT -----  Regarding: Appointment/US scheduling  This patient saw Dr. Up today and it was noted he was to follow up in August with the vascular team with ultrasounds prior. Please call patient to get scheduled. The ultrasound orders are in. Can be scheduled with Pao or other vascular provider. Thank you

## 2024-09-09 NOTE — PROGRESS NOTES
Wound Clinic Note          Visit date: 09/09/2024       Cheif Complaint:     Erwin Aguilar is a 63 year old  male had no chief complaint listed for this encounter.  He has a right BKA wound.      HISTORY OF PRESENT ILLNESS:    Erwin Aguilar reports the ulcer has been present since late May 2024.  The wound began after a recent surgery and the incision did not heal normally.      He had a right BKA performed on May 9, 2024.  Postoperatively a portion of the incision dehisced.  He also has a significant history of peripheral artery disease and according to the patient he had stents placed in his femoral arteries prior to his BKA surgery.  However he does not have any further follow-up appointments scheduled with vascular surgery.      The patient has been bandaging the wound with medical honey, an ABD pad and a Tubigrip stocking changed once a day or every other day.  There has been Light serous  drainage from the wound.     He does not yet have a prosthetic.      The pateint denies fevers or chills.  They report the pain from the wound has been 0/10 and has remained about the same recently.      Today the patient reports maintaining a regular diet without special attention to protein.        The patient denies a history of smoking or chronic steroid use.  The patient confirms having diabetes and reports the blood sugars are well controlled.         The patient has not had any symptoms of infection relating to the wound recently and is not currently on antibiotics.       Problem List:   Past Medical History:   Diagnosis Date    Actinic keratosis     aldara    Anxiety 12/1997    Cancer (H)     squamous cell skin CA    Cauda equina spinal cord injury (H)     Chronic sinusitis 05/01/2016    Cirrhosis of liver (H) 04/01/2014    Not biopsy-proven as of 4/1/14.      Depressive disorder     Diastasis recti     Esophageal reflux     Esophageal varices in cirrhosis (H) 04/01/2014    Hospitalized for UGI blee 3/28/14,  endoscopy revealed bleeding varices.    Essential hypertension, benign     Intermittent asthma     Mild depression     Mixed hyperlipidemia     Nasal polyps 05/01/2016    Osteoarthritis of lumbar spine, unspecified spinal osteoarthritis complication status     Other chronic pain     Paroxysmal atrial fibrillation (H) 09/22/2021    SCCA (squamous cell carcinoma) of skin     Seasonal allergic conjunctivitis     Type II or unspecified type diabetes mellitus without mention of complication, not stated as uncontrolled     Unspecified site of spinal cord injury without evidence of spinal bone injury     due to back surgery              Family Hx: family history includes Breast Cancer in his mother; Cancer in his father and mother; Diabetes in his brother, brother, and brother; Other Cancer in his mother; Snoring in his father.       Surgical Hx:   Past Surgical History:   Procedure Laterality Date    ABDOMEN SURGERY  2014    AMPUTATE LEG BELOW KNEE Right 5/9/2024    Procedure: Right below knee amputation (transtibial amputation);  Surgeon: Kurtis Nye MD;  Location: [facility]    ANGIOGRAM Right 4/23/2024    Procedure: Ultrasound guided percutaneous transarterial left common femoral artery access, diagnostic aortoiliac angiogram, selective cannulation of right comon iliac, righ external iliac, right common femoral, and right superficial femoral artery, balloon angioplasty of right superficial femoral artery, 6mm x 12 cm self-expanding drug-coated stent placement of right superficial femoral artery, lef    BACK SURGERY  August 2009    COLONOSCOPY N/A 5/12/2016    Procedure: COMBINED COLONOSCOPY, SINGLE OR MULTIPLE BIOPSY/POLYPECTOMY BY BIOPSY;  Surgeon: Ana Paula Urbina MD;  [facility]    DECOMPRESSION CUBITAL TUNNEL Left 8/31/2023    Procedure: LEFT CUBITAL TUNNEL RELEASE,;  Surgeon: Deny Dixon MD;  Location: [facility] OR    ENDOSCOPY UPPER, COLONOSCOPY, COMBINED  10/19/2011    Procedure:COMBINED ENDOSCOPY  UPPER, COLONOSCOPY; Upper Endoscopy, Colonoscopy with Polypectomy x4; Surgeon:AMBAR RODRÍGUEZ; Location:[facility]    ENT SURGERY  1-2016    Ongoing since then    ESOPHAGOSCOPY, GASTROSCOPY, DUODENOSCOPY (EGD), COMBINED  3/28/2014    Procedure: COMBINED ESOPHAGOSCOPY, GASTROSCOPY, DUODENOSCOPY (EGD);  EGD, Gastric Biopsies, Esophageal Banding;  Surgeon: Reyna Tovar MD;  [facility]    ESOPHAGOSCOPY, GASTROSCOPY, DUODENOSCOPY (EGD), COMBINED  6/9/2014    Procedure: COMBINED ESOPHAGOSCOPY, GASTROSCOPY, DUODENOSCOPY (EGD);  Surgeon: Curtis Mendez MD;  [facility]    ESOPHAGOSCOPY, GASTROSCOPY, DUODENOSCOPY (EGD), COMBINED  7/24/2014    Procedure: COMBINED ESOPHAGOSCOPY, GASTROSCOPY, DUODENOSCOPY (EGD);  Surgeon: Gerard Samaniego MD;  [facility]    ESOPHAGOSCOPY, GASTROSCOPY, DUODENOSCOPY (EGD), COMBINED N/A 10/31/2014    Procedure: COMBINED ESOPHAGOSCOPY, GASTROSCOPY, DUODENOSCOPY (EGD);  Surgeon: Gerard Samaniego MD;  [facility]    ESOPHAGOSCOPY, GASTROSCOPY, DUODENOSCOPY (EGD), COMBINED N/A 5/12/2016    Procedure: COMBINED ESOPHAGOSCOPY, GASTROSCOPY, DUODENOSCOPY (EGD);  Surgeon: Ana Paula Urbina MD;  [facility]    ESOPHAGOSCOPY, GASTROSCOPY, DUODENOSCOPY (EGD), COMBINED N/A 8/2/2018    Procedure: COMBINED ESOPHAGOSCOPY, GASTROSCOPY, DUODENOSCOPY (EGD);  EGD;  Surgeon: Yu Wagner MD;  [facility]    HCL SQUAMOUS CELL CARCINOMA AG  10/07    left forearm    HERNIORRHAPHY UMBILICAL  11/8/2012    Procedure: HERNIORRHAPHY UMBILICAL;  Open Umbilical Hernia Repair With Mesh ;  Surgeon: Chase Nicholson MD;  [facility]    INSERT STIMULATOR DORSAL COLUMN N/A 6/28/2018    Procedure: INSERT STIMULATOR DORSAL COLUMN;;  Surgeon: Elvis Sauceda MD;  [facility]    IR LOWER EXTREMITY ANGIOGRAM RIGHT  4/23/2024    neuro stimulator  2010    RELEASE CARPAL TUNNEL Left 8/31/2023    Procedure: LEFT OPEN CARPAL TUNNEL RELEASE,;   Surgeon: Deny Dixon MD;  [facility]    RELEASE TRIGGER FINGER Left 8/31/2023    Procedure: Release left trigger finger thumb;  Surgeon: Deny Dixon MD;  [facility]    REMOVE GENERATOR STIMULATOR (LOCATION) N/A 6/28/2018    Procedure: REMOVE GENERATOR STIMULATOR (LOCATION);  Spinal Cord Stimulator and IPG Explant and Re-Implant of SCS System (Leads and IPG);  Surgeon: Elvis Sauceda MD;  [facility]    REPAIR TENDON ACHILLES Right 11/11/2020    Procedure: Right achilles debridement and partial calcaneus excision;  Surgeon: Eh Sandoval MD;  Location: [facility]    SURGICAL HISTORY OF -   1/02    abcess tooth    SURGICAL HISTORY OF -   1999    L4-5 laminectomy, cauda equina syndrome    SURGICAL HISTORY OF -   +    herniated disk repair    TONSILLECTOMY  10 1964    TRANSPOSITION ULNAR NERVE (ELBOW)      right    ZZC APPENDECTOMY  1974          Allergies:    Allergies   Allergen Reactions    Levaquin Anaphylaxis and Rash     Swelling in lip and tongue felt thick    Lisinopril Anaphylaxis     Swollen tongue; inability to swallow; drooling; hives; swollen face, neck, angioedema    Acetaminophen      Hx of cirrhosis     Amlodipine      Swelling, hives, possible angioedema      Morphine      b/p dropped and arms went numb  Hypotension    Quinolones Hives    Spironolactone Unknown     Pt believes himself to be allergic to it; cannot find his old records of this.-mjc     Bactrim [Sulfamethoxazole-Trimethoprim] Rash              Medication History:    Current Outpatient Medications   Medication Sig Dispense Refill    albuterol (PROAIR HFA/PROVENTIL HFA/VENTOLIN HFA) 108 (90 Base) MCG/ACT inhaler INHALE 2 PUFFS BY MOUTH EVERY 6 HOURS AS NEEDED FOR SHORTNESS OF BREATH OR WHEEZING (Patient taking differently: 2 puffs every 6 hours as needed INHALE 2 PUFFS BY MOUTH EVERY 6 HOURS AS NEEDED FOR SHORTNESS OF BREATH OR WHEEZING) 18 g 1    apixaban ANTICOAGULANT (ELIQUIS ANTICOAGULANT) 5 MG  tablet Take 1 tablet (5 mg) by mouth 2 times daily 180 tablet 3    aspirin 81 MG EC tablet Take 1 tablet (81 mg) by mouth daily 84 tablet 0    baclofen (LIORESAL) 10 MG tablet Take 2 tablets (20 mg) by mouth 3 times daily. 360 tablet 3    Continuous Blood Gluc  (FREESTYLE CACHORRO 2 READER) GASTON USE TO READ BLOOD SUGARS AS PER 'S INSTRUCTIONS (Patient not taking: Reported on 6/4/2024) 1 each 0    doxazosin (CARDURA) 8 MG tablet Take 1 tablet (8 mg) by mouth at bedtime 90 tablet 1    gabapentin (NEURONTIN) 300 MG capsule Take 600 mg by mouth in the afternoon and 300 mg at bedtime 270 capsule 3    hydrOXYzine HCl (ATARAX) 25 MG tablet Take 1 tablet (25 mg) by mouth every 6 hours as needed for other (adjuvant pain) (Patient not taking: Reported on 6/4/2024) 30 tablet 0    insulin glargine (LANTUS SOLOSTAR) 100 UNIT/ML pen INJECT 26 UNITS SUBCUTANEOUSLY AT BEDTIME (Patient taking differently: 26 Units at bedtime INJECT 26 UNITS SUBCUTANEOUSLY AT BEDTIME) 15 mL 1    insulin pen needle (GLOBAL EASE INJECT PEN NEEDLES) 32G X 4 MM miscellaneous USE AS DIRECTED EVERY  each 1    Lidocaine (LIDOCARE) 4 % Patch Place 1 patch onto the skin every 24 hours To prevent lidocaine toxicity, patient should be patch free for 12 hrs daily. (Patient not taking: Reported on 6/4/2024) 10 patch 0    lidocaine (LMX4) 4 % external cream Apply topically once as needed for mild pain (Patient not taking: Reported on 6/4/2024) 28 g 0    medical cannabis (Patient's own supply) See Admin Instructions (The purpose of this order is to document that the patient reports taking medical cannabis.  This is not a prescription, and is not used to certify that the patient has a qualifying medical condition.)   Flower - PRN      methocarbamol (ROBAXIN) 500 MG tablet Take 1 tablet (500 mg) by mouth 4 times daily 120 tablet 0    ondansetron (ZOFRAN) 4 MG tablet Take 1 tablet (4 mg) by mouth 2 times daily as needed for nausea. 180 tablet 3     oxyCODONE (ROXICODONE) 5 MG tablet Take 1 tablet (5 mg) by mouth 5 times daily As needed for post op pain in addition to 10mg three times daily scheduled. (Patient not taking: Reported on 8/5/2024) 30 tablet 0    oxyCODONE IR (ROXICODONE) 10 MG tablet Take 1 tablet (10 mg) by mouth 3 times daily as needed for severe pain. Do not start before August 30, 2024. 90 tablet 0    promethazine (PHENERGAN) 25 MG tablet TAKE 1 TABLET BY MOUTH 3 TIMES A DAY AS NEEDED FOR NAUSEA 270 tablet 0    propranolol (INDERAL) 40 MG tablet Take 1 tablet (40 mg) by mouth 3 times daily 270 tablet 2    rosuvastatin (CRESTOR) 40 MG tablet Take 1 tablet (40 mg) by mouth daily 90 tablet 3    sertraline (ZOLOFT) 100 MG tablet TAKE 2 TABLETS BY MOUTH DAILY 180 tablet 2    triamterene-HCTZ (DYAZIDE) 37.5-25 MG capsule Take 1 capsule by mouth every morning 90 capsule 1     No current facility-administered medications for this visit.         Tobacco History:  reports that he quit smoking about 40 years ago. His smoking use included cigarettes. He started smoking about 40 years ago. He has been exposed to tobacco smoke. He has never used smokeless tobacco.       REVIEW OF SYMPTOMS:   The review of systems was negative except as noted in the HPI.           PHYSICAL EXAMINATION:     There were no vitals taken for this visit.           GENERAL: The patient overall appears well and is no acute distress.   HEAD: normocephalic   EYES: Sclera and conjunctiva clear   NECK: no obvious masses   LUNGS: breathing is unlabored.   EXTREMITIES: No clubbing, cyanosis or edema   SKIN: No rashes or other abnormalities except as noted under the Wound section below.   NEUROLOGICAL: normal motor and sensory function       WOUND/ulcer: The wound appears healthy with no sign of infection.   Wound bed: necrotic material  Periwound: healthy intact skin  Overall fairly healthy appearing wound with just a small amount of necrotic material in it.      Also see below for  wound details:     Circumferential volume measures:             No data to display                Ulceration(s)/Wound(s):   Please see the media tab under the chart review for pictures of the wounds.  Nursing staff removed dressings and cleansed wound.             Recent Labs   Lab Test 05/03/24  1105 01/09/24  0932 08/10/23  0710   A1C 5.9* 5.9* 6.7*          Recent Labs   Lab Test 05/10/24  0744 05/09/24  1939 01/05/24  1208   ALBUMIN 4.2 4.1 4.5              Procedure note:  Informed Consent:  Patient acknowledges that I have explained the patient's general medical condition to him/her.  Patient has been informed and acknowledges that I have explained the risks or complications of wound debridement including, but not limited to, scarring, damage to blood vessels or surrounding areas such as nerves and organs, allergic reactions to topical and injected anaesthetic and/or skin prep solutions.  Other risks include excessive bleeding, removal of healthy tissue, infection, pain and inflammation, and prolonged or failure to heal.  Patient acknowledges that bleeding after debridement and pain may worsen after debridement and that dead/necrotic tissue may cause bacteria and toxins to be released into the bloodstream and cause sepsis or shock.  Patient acknowledges that I have explained that the wound may be larger after debridement.  Patient acknowledges that they may need serial debridements while under care in the wound department.  Patient acknowledges that they were given an opportunity to ask questions about treatment and I have answered patient's questions.    Anesthetized as needed with lidocaine.  Using a curette and/or a scalpel I performed an excisional debridement removing all necrotic material at the right BKA wound in to the level of viable subcutaneous tissue.  I obtained hemostasis with direct pressure.  The patient tolerated the procedure well.  EBL: <5 ml  Total debridement surface area: Less than 20  cm                  ASSESSMENT:   This is a 63 year old  male with a right BKA wound          PLAN:   He will bandage the area with Hydrofera Blue, silicone adhesive bandage and a Tubigrip stocking change once a day or every other day depending on the drainage and his bathing schedule.  I have encouraged him not to begin using his prosthesis until this wound is healed.  We will also make sure that he has follow-up with vascular surgery.  I have explained to the patient the importance of protein intake to wound healing.  I have explained that increasing protein intake will speed wound healing.  We discussed several types of food that are high in protein and the wound care nurse gave the patient a handout that summarizes this information.  In addition to further speed wound healing I have encouraged the patient to take a protein supplement.   The patient will return to the wound clinic in 3 to 4 weeks to see me again.        45 minutes spent on the date of the encounter doing chart review, history and exam, documentation and further activities per the note, this time excludes any procedure time      Sharif Up MD  09/09/2024   8:13 AM   [facility] Vascular/Wound  [phone]    This note was electronically signed by Sharif Up MD

## 2024-09-10 ENCOUNTER — MYC MEDICAL ADVICE (OUTPATIENT)
Dept: VASCULAR SURGERY | Facility: CLINIC | Age: 64
End: 2024-09-10
Payer: MEDICARE

## 2024-09-12 ENCOUNTER — TELEPHONE (OUTPATIENT)
Dept: FAMILY MEDICINE | Facility: CLINIC | Age: 64
End: 2024-09-12
Payer: MEDICARE

## 2024-09-12 DIAGNOSIS — E11.9 TYPE 2 DIABETES MELLITUS WITHOUT COMPLICATION, WITH LONG-TERM CURRENT USE OF INSULIN (H): Primary | ICD-10-CM

## 2024-09-12 DIAGNOSIS — Z79.4 TYPE 2 DIABETES MELLITUS WITHOUT COMPLICATION, WITH LONG-TERM CURRENT USE OF INSULIN (H): Primary | ICD-10-CM

## 2024-09-12 RX ORDER — BLOOD-GLUCOSE METER
KIT MISCELLANEOUS
Qty: 1 KIT | Refills: 0 | Status: SHIPPED | OUTPATIENT
Start: 2024-09-12

## 2024-09-12 NOTE — TELEPHONE ENCOUNTER
Home Care is calling regarding an established patient with [facility].        9/12/2024     1:15 PM 6/5/2024     3:54 PM   Home Care Information   Date of Home Care episode start  5/31/2024   Current following provider Dr. Waegner Dr. Wegener   Date provider agreed to follow  5/31/2024    Name/Phone Number Marjorie  [phone] Venkat [phone]   Home Care agency [facility]     Requesting orders from: Wegener, Joel Daniel Irwin  Provider is following patient: Yes  Is this a 60-day recertification request?  Yes    Orders Requested    Skilled Nursing  Request for recertification   Frequency:  1x/wk for 8 wks      Physical Therapy-Evaluation  and Treat  Request for recertification     Social Work- Start Service    Marjorie also wanted to confirm that he should be taking Lantus 26 units at bedtime as he says this was prescribed but doesn't have it so has been taking his girlfriends.   Confirmed that this was prescribed.     Also would like an order for continuous glucose meter.     Also FYI: He was vomiting this morning and wanted Dr. Ramirez to know.       Confirmed ok to leave a detailed message with call back.  Contact information confirmed and updated as needed.    Jenn Antonio, RN

## 2024-09-13 ENCOUNTER — TELEPHONE (OUTPATIENT)
Dept: GASTROENTEROLOGY | Facility: CLINIC | Age: 64
End: 2024-09-13
Payer: MEDICARE

## 2024-09-13 DIAGNOSIS — Z12.11 SPECIAL SCREENING FOR MALIGNANT NEOPLASMS, COLON: Primary | ICD-10-CM

## 2024-09-13 RX ORDER — BISACODYL 5 MG/1
TABLET, DELAYED RELEASE ORAL
Qty: 4 TABLET | Refills: 0 | Status: SHIPPED | OUTPATIENT
Start: 2024-09-13

## 2024-09-13 NOTE — TELEPHONE ENCOUNTER
Miguel A Thomas PA-C Arola, Tracy, RN; Leventhal, Thomas Michael, MD  Correct - okay to cancel EGD.     He should reach out to his PCP for cologuard if he declines colonoscopy.    Thanks! GG  ---------------------------------------------------------------------------------------------------  Follow up call placed to patient to communicate above message.     No answer. Left message to call back [phone] option 2.    Isabel Linares RN  Endoscopy Procedure Pre Assessment [place]ODERICK  [phone] option 4

## 2024-09-13 NOTE — TELEPHONE ENCOUNTER
Caller: Erwin      Reason for Reschedule/Cancellation   (please be detailed, any staff messages or encounters to note?): Pt stated the procedure was unneeded       Prior to reschedule please review:  Ordering Provider: LEVENTHAL, THOMAS MICHAEL   Sedation Determined: MAC  Does patient have any ASC Exclusions, please identify?: Y      Notes on Cancelled Procedure:  Procedure: Upper and Lower Endoscopy [EGD and Colonoscopy]   Date: 09/26/2024  Location: [facility]; [address] [place] [address], [address] Floor, [place] [address]   Surgeon: Carlos      Rescheduled: No,         Did you cancel or rescheduled an EUS procedure? No.

## 2024-09-13 NOTE — TELEPHONE ENCOUNTER
LVMTCB #3 to schedule ultrasounds and visit with Pao Bronson.  This was due in August.  [phone]  Send letter if no response

## 2024-09-13 NOTE — TELEPHONE ENCOUNTER
Call placed to patient to go over pre assessment for upcoming colonoscopy/EGD on 9.26.24.    Patient answered the phone - states was told that his liver scan has enough improvement to not have to need the endoscopies anymore. States for the colonoscopy is going to use the cologuard.    After this discussion with the patient, it was decided that a message is to be sent to the ordering provider and the gastro provider for clarification.  Will keep procedures on the schedule until hearing from them.    Patient verbalized understanding and in agreement with plan.    Isabel Linares RN  Endoscopy Procedure Pre Assessment RN  [phone] option 4

## 2024-09-14 ENCOUNTER — MEDICAL CORRESPONDENCE (OUTPATIENT)
Dept: HEALTH INFORMATION MANAGEMENT | Facility: CLINIC | Age: 64
End: 2024-09-14
Payer: MEDICARE

## 2024-09-17 ENCOUNTER — TELEPHONE (OUTPATIENT)
Dept: FAMILY MEDICINE | Facility: CLINIC | Age: 64
End: 2024-09-17
Payer: MEDICARE

## 2024-09-17 NOTE — TELEPHONE ENCOUNTER
Cori,   Dariana from [facility] called to request the following verbal orders from JW:    PT 1x a week for 8 weeks.    Best call back #: [phone].   Confidential vm. Federico for detailed vm.    Thanks!  Livia CROCKER

## 2024-09-17 NOTE — TELEPHONE ENCOUNTER
Home Care is calling regarding an established patient with  [facility].        9/12/2024     1:15 PM 6/5/2024     3:54 PM   Home Care Information   Date of Home Care episode start  5/31/2024   Current following provider Dr. Waegner Dr. Wegener   Date provider agreed to follow  5/31/2024    Name/Phone Number Marjorie VAUGHN  RODERICK Robledo [phone]   Home Care agency [facility]     Requesting orders from: Wegener, Joel Daniel Irwin  Provider is following patient: Yes  Is this a 60-day recertification request?  No    Orders Requested    Physical Therapy  Request for continuation of care with no increase or decrease in frequency  Frequency:  See below          Verbal orders given.  Home Care will send orders for provider to sign.    Roslyn Vela RN

## 2024-09-19 ENCOUNTER — TELEPHONE (OUTPATIENT)
Dept: FAMILY MEDICINE | Facility: CLINIC | Age: 64
End: 2024-09-19
Payer: MEDICARE

## 2024-09-19 NOTE — TELEPHONE ENCOUNTER
Forms/Letter Request    Type of form/letter: Order # [id number]  ADD On Discipline PT Orders to Evaluate/Access  PT Plan Of Care  PT Effective 9/15/24 1WK9     Focus of Care: Strengthening, Balance, Activity Tolerance  Specific Treatment Orders: PT 1WK8    Do we have the form/letter: Yes:     Who is the form from? [facility]    Where did/will the form come from? form was faxed in    When is form/letter needed by: asap    How would you like the form/letter returned: Fax : [phone]

## 2024-09-23 ENCOUNTER — MYC MEDICAL ADVICE (OUTPATIENT)
Dept: FAMILY MEDICINE | Facility: CLINIC | Age: 64
End: 2024-09-23
Payer: MEDICARE

## 2024-09-23 ENCOUNTER — MYC REFILL (OUTPATIENT)
Dept: FAMILY MEDICINE | Facility: CLINIC | Age: 64
End: 2024-09-23
Payer: MEDICARE

## 2024-09-23 DIAGNOSIS — M65.28 CALCIFIC ACHILLES TENDINITIS: ICD-10-CM

## 2024-09-23 DIAGNOSIS — M79.671 INTRACTABLE RIGHT HEEL PAIN: ICD-10-CM

## 2024-09-23 DIAGNOSIS — M54.50 ACUTE BILATERAL LOW BACK PAIN WITHOUT SCIATICA: Primary | ICD-10-CM

## 2024-09-23 DIAGNOSIS — Z98.890 H/O FOOT SURGERY: ICD-10-CM

## 2024-09-23 DIAGNOSIS — M21.6X1 ACQUIRED CALCANEUS DEFORMITY OF RIGHT ANKLE: ICD-10-CM

## 2024-09-23 DIAGNOSIS — G89.4 CHRONIC PAIN SYNDROME: ICD-10-CM

## 2024-09-23 DIAGNOSIS — G89.29 CHRONIC PAIN OF RIGHT ANKLE: ICD-10-CM

## 2024-09-23 DIAGNOSIS — M25.571 CHRONIC PAIN OF RIGHT ANKLE: ICD-10-CM

## 2024-09-23 DIAGNOSIS — S86.011A RUPTURE OF RIGHT ACHILLES TENDON, INITIAL ENCOUNTER: ICD-10-CM

## 2024-09-23 RX ORDER — OXYCODONE HYDROCHLORIDE 10 MG/1
10 TABLET ORAL 3 TIMES DAILY PRN
Qty: 90 TABLET | Refills: 0 | Status: SHIPPED | OUTPATIENT
Start: 2024-09-23

## 2024-09-24 ENCOUNTER — TELEPHONE (OUTPATIENT)
Dept: FAMILY MEDICINE | Facility: CLINIC | Age: 64
End: 2024-09-24
Payer: MEDICARE

## 2024-09-24 NOTE — TELEPHONE ENCOUNTER
"Ok to do, agree with plan.  Rae \"Juni\" GENEVIEVE Payan for Dr. Wegener while he is out of office (assuming they will take a verbal from non-PCP?)  "

## 2024-09-24 NOTE — TELEPHONE ENCOUNTER
Reason for Call:  Home Health Care    Elizabeth with [facility] called regarding (reason for call): verbal order    Orders are needed for this patient. Patient is Requesting Discharge from Home Health starting Next Week 9/2924    PT: N/A    OT: N/A    Skilled Nursing: N/A    Pt Provider: Wegener    Phone Number Homecare Nurse can be reached at: [phone]    Can we leave a detailed message on this number? YES    Best Time: Today    Call taken on 9/24/2024 at 11:55 AM by Grecia Harmon

## 2024-09-24 NOTE — TELEPHONE ENCOUNTER
"JW/covering provider,  Elizabeth [facility] Claribel requesting PCP verbal order to discontinue home care services       Spoke with Elizabeth, Clinical Supervisor at [facility]    Patient declined skilled nursing this week   Patient requesting to discontinue skilled nursing home care  Patient does his own dressing changes  Home Care RN reports wound is nearly healed  However, goals not met yet, but patient is declining services  FYI has upcoming wound clinic appointment on Monday    See patient mychart encounter today  Patient reports being unable to clean his home  [facility] does not provide house cleaning services  [facility] offered social work referral resources  Patient states \"I do not need social work to help me find resources, I can look it up on the internet\"  Patient refused social work, home health aid    Thanks,  Romelia KLEIN, RN      "

## 2024-09-24 NOTE — TELEPHONE ENCOUNTER
RENATA/covering provider,  Please see below Paxerahart message and advise.  Pended care coordination referral if appropriate  -affordability and coordination of medical supply - wheelchair  -possible home health aid?    Thanks,  Romelia KLEIN RN

## 2024-09-25 ENCOUNTER — PATIENT OUTREACH (OUTPATIENT)
Dept: CARE COORDINATION | Facility: CLINIC | Age: 64
End: 2024-09-25
Payer: MEDICARE

## 2024-09-25 NOTE — PROGRESS NOTES
Clinic Care Coordination Contact  [facility]/Voicemail    Clinical Data: Care Coordinator Outreach    Outreach Documentation Number of Outreach Attempt   9/25/2024   9:34 AM 1       Left message on patient's voicemail with call back information and requested return call.    Plan: Care Coordinator will try to reach patient again in 1-2 business days.    Amanda Porter CHW, B.A. [facility] Care Coordination  [facility]:   [facility]  [phone]

## 2024-09-25 NOTE — LETTER
[facility] CARE COORDINATION  [address]  [facility] [address]    September 30, 2024    Erwin Aguilar  [address]  [place] [address]      Eliceo Hood,     Thank you for taking the time to speak with me today. Below I have included the transportation resources which we discussed.     [employer] [phone]   Transportation for seniors - call for rates     Taxi Services  [employer] - [phone]  [employer] [employer], [phone]  [employer], [phone] $5 pick-up, $1.80/mile    [facility], [phone]     Assisted Transport - [phone]  Serves [address] [place] [address] area.  Call for rates.  Private pay.  Some insurance plans accepted.  Non-emergency wheelchair and stretcher transportation.  Medical appointments.       [facility] Special Transportation - [phone]  Serves [address] [place] [address] area. Medical appointments     [employer] - [phone]  Non emergency medical. Door to door. Has accessible vehicles. Serves [facility] area. Weekend services if scheduled.      [facility] of the [facility]   Phone: [phone]   [email or web address]   The [facility] offers transportation services to individuals with limited access to transportation. We provide services M-F for medical appointments, errands and transportation to the [facility], and on Wednesday mornings for grocery shopping. Rides are provided based on a sliding fee scale and availability. Cost is free to $8 round trip.         If you have any further questions, do not hesitate to contact me.    Michell Del Cid  Community Health Worker  [facility]  [phone]

## 2024-09-26 NOTE — PROGRESS NOTES
"Clinic Care Coordination Contact  Community Health Worker Initial Outreach    Patient accepts CC: Pt is not sure that CC can do anything for him. Declines initial assessment call with CHRISTEN Leyva RN.    Pt left 2 VMs 9/25/24 requesting return phone call. Called at 8:29 am, however, did not leave a VM.    Spoke to pt for 18 min this morning. 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 Pt states someone from care coordination tried to get him a wheelchair initially, however, it was sent to the wrong vendor. This is likely referring to a visit pt had with TINO Fowler, on 5/30/24 regarding referral placed by Ortho, specifically regarding a wheelchair. It appears he did receive a wheelchair, however, the seat is now broken. He is sitting on the wheelchair bars, using 3 pillows for padding, but it is still is very uncomfortable for him. A customized wheelchair has been ordered but it will not be available to him for 3 months (CHW cannot remember the exact time it will be available). \"I cannot live in this broken wheelchair for 3 more months.\" Pt reports having a spinal chord injury with resultant back pain which is exacerbated by broken wheelchair seat.  12/5/24 getting prosthetic leg - until then pt needs to utilize wheelchair for mobility.  Had home health care after his BKA - nursing was coming out once weekly for wound assessment with pt doing the wraps daily. He told them not to return because he did not feel like SN was doing anything for him. HHA came once to give him a 5 min bath; it was so much work for pt to put up a shower curtain in his SO's shower, etc. that he did not think the bath assist was worth it so he did not have another bath with this HHA. See 9/24/24 telephone encounter.  BKA wound got infected - pt opted for non-surgical healing of stump and the wound is almost healed.   \"I haven't had a " "shower since May.\"  \"My bed hasn't been made since May.\" \"I don't think I can go on like this.\" \"I haven't been able to relax since May 2024.\" Pt states he has fallen twice since his BKA in May 2024.  PCA - pt admits to needing help at home. Pt states he always makes too much money to get MA. CHW asked if he could afford private pay home help, and pt states no. He makes $2100/mo and rent is $1200/mo. CHW asks if he has any family, friends or a andres based community who could help him. All his family and his so's family have passed away; he has no children. Pt states there is a person who lives in their building, a young man, who has helped move somethings at times. Since moving into a new apartment in May 2024, most things are still in boxes as this is when pt's health declined resulting in BKA.  5/30/24 note from f/u visit with TINO Fowler, states pt's partner has a much larger income than he does and they recently moved to a less expensive apartment with many amenities.   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 See CHW note from 5/14/24.  Medications - meds have been waiting for 2 weeks at [facility]. Pt states two of his medications are schedule one drugs and he needs to pick them up in person.   CHW recommends pt speak with CHRISTEN VAUGHN, Autumn, about procuring his needed medications. \"I don't want to waste her time or mine if she cannot really help me.\" \"I don't think there is anything you can do for me, but I thought I would try.\"  Pt is in agreement with CHW reaching out to CHRISTEN Leyva RN, and CHRISTEN Cedeno, regarding his situation to see if they have ideas for assistance in these matters.     CHW Plan: CHW forwarded this encounter to CHRISTEN Leyva RN, and CHRISTEN Cedeno, regarding his situation, for their knowledge and input.    Michell Del Cid  Community Health Worker  [facility]  [phone]                                        "

## 2024-09-27 NOTE — TELEPHONE ENCOUNTER
Please see note from 9/25/24. Pt has spoken with care coordination.     Anayeli Harris RN  [facility]

## 2024-09-27 NOTE — PROGRESS NOTES
Clinic Care Coordination Contact  Community Health Worker Initial Outreach    Per discussion with CHRISTEN Stephens RN, CHW was asked to call pt regarding some options for getting another wheelchair until his custom wheelchair built/delivered in approximately 3 months.    Spoke to pt this afternoon:  Regarding his current wheelchair:  Seat is broken. It was purchased through [employer] for $400 and pt admits it was a lightweight wheelchair not build for strenuous use. The next upgrade through [employer] would likely be $1000 which he doesn't have.  Asked pt if the company he ordered his custom wheelchair from offers Navendis wheelchairs. Pt states they do not.  Pt states Dr. Wegener offered a wheelchair from the  Clinic but pt doesn't think it would be strong enough for his use.  CHW asks if he would consider going to a [employer] to see if they have a wheelchair he could use temporarily. Pt again states he doesn't think any of those wheelchairs are sturdy enough for his use.  Pt states he needs a sturdy wheelchair capable of going up/down a ramp, then up/down a 300 foot hill where pt smokes his medical cannabis.     Regarding custom wheelchair ordered:   It will likely be ready in approximately 3 months, but it could be longer per Medicare stipulations.  It is more of a sports type wheelchair, build lower to the ground with angled wheels.    Regarding getting Class 1 Medications from his pharmacy:  Main issues in picking up these medications is lack of person to pick them up for him. There is a friend in his building who is very, very busy and he hates to ask him to do this. CHW encourages him to ask anyway, as this person may be able to make a quick trip for him to  these meds.  [facility] costs $40 per trip to  medications.  [employer] ([employer]) - pt doesn't feel would meet his needs as he states it would take him about 4 hours per trip which is very uncomfortable for his back. He states MM rides are unpredictable and  would just take too long for him to utilize.  CHW asks if he would like some transportation resources and he does. CHW will send transportation resources on 9/30/24.       TRANSPORTATION:    [employer] [phone]   Transportation for seniors - call for rates     Taxi Services  [employer] - [phone]  [place] [employer], [phone]  [facility], [phone] $5 pick-up, $1.80/mile    [facility], [phone]     Assisted Transport - [phone]  Serves [address] [place] [address].  Call for rates.  Private pay.  Some insurance plans accepted.  Non-emergency wheelchair and stretcher transportation.  Medical appointments.       [facility] Special Transportation - [phone]  Serves [address] [place] [address]. Medical appointments     [employer] - [phone]  Non emergency medical. Door to door. Has accessible vehicles. Serves [facility] area. Weekend services if scheduled.    [facility] the [facility]   Phone: [phone]   [email or web address]   The [facility] access hospital offers transportation services to individuals with limited access to transportation. We provide services M-F for medical appointments, errands and transportation to the [facility] hospital, and on Wednesday mornings for grocery shopping. Rides are provided based on a sliding fee scale and availability. Cost is free to $8 round trip.           Pt was appreciative of CHW's phone call and problem solving looking for a different wheelchair. CHW and CC clinicians will continue to look into other possible resources for a loaner wheelchair.     CHW plan: CHW will route this encounter to CHRISTEN Leyva RN, for her review and input. CHW will send transportation resources to pt via [employer].     Michell Del Cid  Community Health Worker  [facility]  [phone]

## 2024-09-27 NOTE — TELEPHONE ENCOUNTER
Forms/Letter Request    Type of form/letter: OTHER: two orders:  Physician order  Order # [id number]  Order date 9/24/24  Please discontinue social work orders.  Physician order  Order # [id number]  Order date 9/27/24  Patient requested no visits this week of 9/22/24 and HHA the next week       Do we have the form/letter: Yes: in JW's office    Who is the form from? [facility] (if other please explain)    Where did/will the form come from? form was faxed in    When is form/letter needed by: asap    How would you like the form/letter returned: Fax : [phone]

## 2024-09-27 NOTE — TELEPHONE ENCOUNTER
I'm not sure what else we can do at this time regarding the wheelchair. Can we reach out to PT to see if they can loan him a wheelchair?  MD Alexey

## 2024-09-30 ENCOUNTER — OFFICE VISIT (OUTPATIENT)
Dept: VASCULAR SURGERY | Facility: CLINIC | Age: 64
End: 2024-09-30
Attending: FAMILY MEDICINE
Payer: MEDICARE

## 2024-09-30 VITALS — DIASTOLIC BLOOD PRESSURE: 78 MMHG | SYSTOLIC BLOOD PRESSURE: 159 MMHG | HEART RATE: 55 BPM | OXYGEN SATURATION: 98 %

## 2024-09-30 DIAGNOSIS — T81.89XD NON-HEALING SURGICAL WOUND, SUBSEQUENT ENCOUNTER: Primary | ICD-10-CM

## 2024-09-30 PROCEDURE — 99214 OFFICE O/P EST MOD 30 MIN: CPT | Performed by: FAMILY MEDICINE

## 2024-09-30 PROCEDURE — G0463 HOSPITAL OUTPT CLINIC VISIT: HCPCS | Performed by: FAMILY MEDICINE

## 2024-09-30 ASSESSMENT — PAIN SCALES - GENERAL: PAINLEVEL: NO PAIN (0)

## 2024-09-30 NOTE — PATIENT INSTRUCTIONS
Wound care supplies were ordered today through [employer] and if you are not receiving your supplies or have a question on your bill please contact Vickieabiodun Kramer at [phone]. Please allow 2-5 business days for delivery of supplies. You may get a call from a [phone] # if there are additional information Sebastián needs. It is important to  or return their call. PLEASE NOTE: If you need to return your supplies, you MUST call customer service within 15 days of delivery date.       Wound Care Instructions    EVERY OTHER DAY and as needed, Cleanse your right BKA wound(s) with Normal saline.    Pat Dry with non-sterile gauze    Apply Lotion to the intact skin surrounding your wound and other dry skin locations. Some good lotions include: Remedy Skin Repair Cream, Sarna, Vanicream or Cetaphil    Primary Dressing: Apply Hydrofera blue ready transfer into/onto the wounds (use saline to remove if sticking).    Secondary dressing: Cover with silicone foam or Zetuvit    Compression: tubular compression    It is ok to get your wound wet in the bath or shower    SEEK MEDICAL CARE IF:  You have an increase in swelling, pain, or redness around the wound.  You have an increase in the amount of pus coming from the wound.  There is a bad smell coming from the wound.  The wound appears to be worsening/enlarging  You have a fever greater than 101.5 F      It is ok to continue current wound care treatment/products for the next 2-3 days until new wound care supplies are ordered and arrive. If longer than this please contact our office at [phone].    If for some reason you are not able to get your dressing(s) changed as outlined above (due to illness, lack of supplies, lack of help) please do the following: remove old, soiled dressings; wash the wounds with saline; pat dry; apply ABD pad or other absorbant pad and secure with rolled gauze; avoid tape directly on your skin; Call the clinic as soon as possible to let us know what the  current issues are in receiving wound care [phone].    If you have a 2 layer or 4 layer compression wrap on these are safe to have on for ONLY 7 days. If for some reason you are not able to get the wrap(s) changed (due to illness; lack of supplies, lack of help, lack of transportation) please do the following: unwrap the old 2 or 4 layer compression wrap; avoid using scissors as you could cut your skin and cause wounds; use tubular compression when available. Call to reschedule your home care or clinic visit appointment as soon as possible.      High Protein Foods  When you have an open ulcer, your bodies protein needs are much higher, so it is recommended eat good sources of protein or take a protein supplement!    Protein Supplements  -Premier Protein  -Ensure  -Boost  -Glucerna, if diabetic    Chicken  -Chicken breast, 3.5oz.-30 grams protein  -Chicken meat, cooked, 4 oz.    Beef  -Hamburger olga, 4 oz-28 grams protein  -Steak, 6 oz-42 grams  -Most cuts of beef- 7 grams of protein per ounce    Fish  -Most fish fillets or steaks are about 22 grams of protein for 3 1/2 oz(100 grams) of cooked fish, or 6 grams per ounce  -Tuna, 6 oz can-40 grams of protein    Pork  -Pork chop, average-22 grams protein  -Pork loin or tenderloin, 4 oz.-29 grams  -Ham, 3oz serving- 19 grams  -Mendiola, 1 slice-3 grams    Eggs and Dairy  -Egg, large-7 grams  -Milk, 1 cup-8 grams  -Cottage cheese, 1/2 cup-15 grams  -Greek yogurt, 1 cup-usually 8-12 grams, check label    Beans  -Soy milk, 1 cup-6-10 grams  -Most beans(black, cook, lentils, etc.) about 7-10 grams protein per half cup of cooked beans    Nuts and Seeds  -Peanut butter, 2 Tablespoons- 8 grams protein  -Almonds, 1/4 cup- 8 grams  -Peanuts, 1/4 cup-9 grams  -Sunflower seeds, 1/4 cup- 6 grams      Please NOTE: if you are 15 minutes late to your clinic appointment you will have to be rescheduled. Please call our clinic as soon as possible if you know you will not be able to  get to your appointment at [phone].  If you fail to show up to 3 scheduled clinic appointments you will be dismissed from our clinic      We want to hear from you!  In the next few weeks, you should receive a call or email to complete a survey about your visit at [facility]. Please help us improve your appointment experience by letting us know how we did today. We strive to make your experience good and value any ways in which we could do better.      We value your input and suggestions.    Thank you for choosing the [facility]!

## 2024-09-30 NOTE — PROGRESS NOTES
Wound Clinic Note          Visit date: 09/30/2024       Cheif Complaint:     Erwin Aguilar is a 63 year old  male had concerns including Wound Check.  He has a right BKA wound.      HISTORY OF PRESENT ILLNESS:    Erwin Aguilar reports the ulcer has been present since late May 2024.  The wound began after a recent surgery and the incision did not heal normally.      He had a right BKA performed on May 9, 2024.  Postoperatively a portion of the incision dehisced.  He also has a significant history of peripheral artery disease and according to the patient he had stents placed in his femoral arteries prior to his BKA surgery.  He is now scheduled to see vascular surgery for follow-up in December.      The patient has been bandaging the wound with Hydrofera Blue, silicone adhesive bandage and a Tubigrip stocking changed once a day or every other day.  There has been Light serous  drainage from the wound.     He does not yet have a prosthetic.      The pateint denies fevers or chills.  They report the pain from the wound has been 0/10 and has remained about the same recently.      Today the patient reports maintaining a high protein diet, but has not been taking protein supplements lately.        The patient denies a history of smoking or chronic steroid use.  The patient confirms having diabetes and reports the blood sugars are well controlled.         The patient has not had any symptoms of infection relating to the wound recently and is not currently on antibiotics.       Problem List:   Past Medical History:   Diagnosis Date    Actinic keratosis     aldara    Anxiety 12/1997    Cancer (H)     squamous cell skin CA    Cauda equina spinal cord injury (H)     Chronic sinusitis 05/01/2016    Cirrhosis of liver (H) 04/01/2014    Not biopsy-proven as of 4/1/14.      Depressive disorder     Diastasis recti     Esophageal reflux     Esophageal varices in cirrhosis (H) 04/01/2014    Hospitalized for UGI blee 3/28/14,  endoscopy revealed bleeding varices.    Essential hypertension, benign     Intermittent asthma     Mild depression     Mixed hyperlipidemia     Nasal polyps 05/01/2016    Osteoarthritis of lumbar spine, unspecified spinal osteoarthritis complication status     Other chronic pain     Paroxysmal atrial fibrillation (H) 09/22/2021    SCCA (squamous cell carcinoma) of skin     Seasonal allergic conjunctivitis     Type II or unspecified type diabetes mellitus without mention of complication, not stated as uncontrolled     Unspecified site of spinal cord injury without evidence of spinal bone injury     due to back surgery              Family Hx: family history includes Breast Cancer in his mother; Cancer in his father and mother; Diabetes in his brother, brother, and brother; Other Cancer in his mother; Snoring in his father.       Surgical Hx:   Past Surgical History:   Procedure Laterality Date    ABDOMEN SURGERY  2014    AMPUTATE LEG BELOW KNEE Right 5/9/2024    Procedure: Right below knee amputation (transtibial amputation);  Surgeon: Kurtis Nye MD;  [facility]    ANGIOGRAM Right 4/23/2024    Procedure: Ultrasound guided percutaneous transarterial left common femoral artery access, diagnostic aortoiliac angiogram, selective cannulation of right comon iliac, righ external iliac, right common femoral, and right superficial femoral artery, balloon angioplasty of right superficial femoral artery, 6mm x 12 cm self-expanding drug-coated stent placement of right superficial femoral artery, lef    BACK SURGERY  August 2009    COLONOSCOPY N/A 5/12/2016    Procedure: COMBINED COLONOSCOPY, SINGLE OR MULTIPLE BIOPSY/POLYPECTOMY BY BIOPSY;  Surgeon: Ana Paula Urbina MD;  [facility]    DECOMPRESSION CUBITAL TUNNEL Left 8/31/2023    Procedure: LEFT CUBITAL TUNNEL RELEASE,;  Surgeon: Deny Dixon MD;  Location: [facility]    ENDOSCOPY UPPER, COLONOSCOPY, COMBINED  10/19/2011    Procedure:COMBINED ENDOSCOPY  UPPER, COLONOSCOPY; Upper Endoscopy, Colonoscopy with Polypectomy x4; Surgeon:AMBAR RODRÍGUEZ; [facility]    ENT SURGERY  1-2016    Ongoing since then    ESOPHAGOSCOPY, GASTROSCOPY, DUODENOSCOPY (EGD), COMBINED  3/28/2014    Procedure: COMBINED ESOPHAGOSCOPY, GASTROSCOPY, DUODENOSCOPY (EGD);  EGD, Gastric Biopsies, Esophageal Banding;  Surgeon: Reyna Tovar MD;  [facility]    ESOPHAGOSCOPY, GASTROSCOPY, DUODENOSCOPY (EGD), COMBINED  6/9/2014    Procedure: COMBINED ESOPHAGOSCOPY, GASTROSCOPY, DUODENOSCOPY (EGD);  Surgeon: Curtis Mendez MD;  [facility]    ESOPHAGOSCOPY, GASTROSCOPY, DUODENOSCOPY (EGD), COMBINED  7/24/2014    Procedure: COMBINED ESOPHAGOSCOPY, GASTROSCOPY, DUODENOSCOPY (EGD);  Surgeon: Gerard Samaniego MD;  [facility]    ESOPHAGOSCOPY, GASTROSCOPY, DUODENOSCOPY (EGD), COMBINED N/A 10/31/2014    Procedure: COMBINED ESOPHAGOSCOPY, GASTROSCOPY, DUODENOSCOPY (EGD);  Surgeon: Gerard Samaniego MD;  [facility]    ESOPHAGOSCOPY, GASTROSCOPY, DUODENOSCOPY (EGD), COMBINED N/A 5/12/2016    Procedure: COMBINED ESOPHAGOSCOPY, GASTROSCOPY, DUODENOSCOPY (EGD);  Surgeon: Ana Paula Urbina MD;  [facility]    ESOPHAGOSCOPY, GASTROSCOPY, DUODENOSCOPY (EGD), COMBINED N/A 8/2/2018    Procedure: COMBINED ESOPHAGOSCOPY, GASTROSCOPY, DUODENOSCOPY (EGD);  EGD;  Surgeon: Yu Wagner MD;  [facility]    HCL SQUAMOUS CELL CARCINOMA AG  10/07    left forearm    HERNIORRHAPHY UMBILICAL  11/8/2012    Procedure: HERNIORRHAPHY UMBILICAL;  Open Umbilical Hernia Repair With Mesh ;  Surgeon: Chase Nicholson MD;  [facility]    INSERT STIMULATOR DORSAL COLUMN N/A 6/28/2018    Procedure: INSERT STIMULATOR DORSAL COLUMN;;  Surgeon: Elvis Sauceda MD;  [facility]    IR LOWER EXTREMITY ANGIOGRAM RIGHT  4/23/2024    neuro stimulator  2010    RELEASE CARPAL TUNNEL Left 8/31/2023    Procedure: LEFT OPEN CARPAL TUNNEL RELEASE,;   Surgeon: Deny Dixon MD;  [facility]    RELEASE TRIGGER FINGER Left 8/31/2023    Procedure: Release left trigger finger thumb;  Surgeon: Deny Dixon MD;  [facility]    REMOVE GENERATOR STIMULATOR (LOCATION) N/A 6/28/2018    Procedure: REMOVE GENERATOR STIMULATOR (LOCATION);  Spinal Cord Stimulator and IPG Explant and Re-Implant of SCS System (Leads and IPG);  Surgeon: Elvis Sauceda MD;  [facility]    REPAIR TENDON ACHILLES Right 11/11/2020    Procedure: Right achilles debridement and partial calcaneus excision;  Surgeon: Eh Sandoval MD;  [facility]    SURGICAL HISTORY OF -   1/02    abcess tooth    SURGICAL HISTORY OF -   1999    L4-5 laminectomy, cauda equina syndrome    SURGICAL HISTORY OF -   +    herniated disk repair    TONSILLECTOMY  10 1964    TRANSPOSITION ULNAR NERVE (ELBOW)      right    ZZC APPENDECTOMY  1974          Allergies:    Allergies   Allergen Reactions    Levaquin Anaphylaxis and Rash     Swelling in lip and tongue felt thick    Lisinopril Anaphylaxis     Swollen tongue; inability to swallow; drooling; hives; swollen face, neck, angioedema    Acetaminophen      Hx of cirrhosis     Amlodipine      Swelling, hives, possible angioedema      Morphine      b/p dropped and arms went numb  Hypotension    Quinolones Hives    Spironolactone Unknown     Pt believes himself to be allergic to it; cannot find his old records of this.-mjc     Bactrim [Sulfamethoxazole-Trimethoprim] Rash              Medication History:    Current Outpatient Medications   Medication Sig Dispense Refill    albuterol (PROAIR HFA/PROVENTIL HFA/VENTOLIN HFA) 108 (90 Base) MCG/ACT inhaler INHALE 2 PUFFS BY MOUTH EVERY 6 HOURS AS NEEDED FOR SHORTNESS OF BREATH OR WHEEZING (Patient taking differently: 2 puffs every 6 hours as needed. INHALE 2 PUFFS BY MOUTH EVERY 6 HOURS AS NEEDED FOR SHORTNESS OF BREATH OR WHEEZING) 18 g 1    apixaban ANTICOAGULANT (ELIQUIS ANTICOAGULANT) 5  MG tablet Take 1 tablet (5 mg) by mouth 2 times daily 180 tablet 3    aspirin 81 MG EC tablet Take 1 tablet (81 mg) by mouth daily 84 tablet 0    baclofen (LIORESAL) 10 MG tablet Take 2 tablets (20 mg) by mouth 3 times daily. 360 tablet 3    bisacodyl (DULCOLAX) 5 MG EC tablet Two days prior to exam take two (2) tablets at 4pm. One day prior to exam take two (2) tablets at 4pm 4 tablet 0    blood glucose monitoring (FREESTYLE FREEDOM LITE) meter device kit Use to test blood sugar 3 times daily or as directed. 1 kit 0    Continuous Blood Gluc  (FREESTYLE CACHORRO 2 READER) GASTON USE TO READ BLOOD SUGARS AS PER 'S INSTRUCTIONS 1 each 0    doxazosin (CARDURA) 8 MG tablet Take 1 tablet (8 mg) by mouth at bedtime 90 tablet 1    gabapentin (NEURONTIN) 300 MG capsule Take 600 mg by mouth in the afternoon and 300 mg at bedtime 270 capsule 3    hydrOXYzine HCl (ATARAX) 25 MG tablet Take 1 tablet (25 mg) by mouth every 6 hours as needed for other (adjuvant pain) 30 tablet 0    insulin glargine (LANTUS SOLOSTAR) 100 UNIT/ML pen INJECT 26 UNITS SUBCUTANEOUSLY AT BEDTIME (Patient taking differently: 26 Units at bedtime. INJECT 26 UNITS SUBCUTANEOUSLY AT BEDTIME) 15 mL 1    insulin pen needle (GLOBAL EASE INJECT PEN NEEDLES) 32G X 4 MM miscellaneous USE AS DIRECTED EVERY  each 1    medical cannabis (Patient's own supply) See Admin Instructions (The purpose of this order is to document that the patient reports taking medical cannabis.  This is not a prescription, and is not used to certify that the patient has a qualifying medical condition.)   Flower - PRN      ondansetron (ZOFRAN) 4 MG tablet Take 1 tablet (4 mg) by mouth 2 times daily as needed for nausea. 180 tablet 3    oxyCODONE (ROXICODONE) 5 MG tablet Take 1 tablet (5 mg) by mouth 5 times daily As needed for post op pain in addition to 10mg three times daily scheduled. 30 tablet 0    oxyCODONE IR (ROXICODONE) 10 MG tablet Take 1 tablet (10 mg) by  mouth 3 times daily as needed for severe pain. 90 tablet 0    polyethylene glycol (GOLYTELY) 236 g suspension Two days before procedure at 5PM fill first container with water. Mix and drink an 8 oz glass every 15 minutes until HALF of the container is gone. Place the remainder in the refrigerator. One day before procedure at 5PM drink second half of bowel prep. Drink an 8 oz glass every 15 minutes until it is gone. Day of procedure 6 hours before arrival time fill the 2nd container with water. Mix and drink an 8 oz glass every 15 minutes until HALF of the container is gone. Discard the remaining solution. 8000 mL 0    promethazine (PHENERGAN) 25 MG tablet TAKE 1 TABLET BY MOUTH 3 TIMES A DAY AS NEEDED FOR NAUSEA 270 tablet 0    propranolol (INDERAL) 40 MG tablet Take 1 tablet (40 mg) by mouth 3 times daily 270 tablet 2    rosuvastatin (CRESTOR) 40 MG tablet Take 1 tablet (40 mg) by mouth daily 90 tablet 3    sertraline (ZOLOFT) 100 MG tablet TAKE 2 TABLETS BY MOUTH DAILY 180 tablet 2    triamterene-HCTZ (DYAZIDE) 37.5-25 MG capsule Take 1 capsule by mouth every morning 90 capsule 1    Lidocaine (LIDOCARE) 4 % Patch Place 1 patch onto the skin every 24 hours To prevent lidocaine toxicity, patient should be patch free for 12 hrs daily. (Patient not taking: Reported on 6/4/2024) 10 patch 0    lidocaine (LMX4) 4 % external cream Apply topically once as needed for mild pain (Patient not taking: Reported on 6/4/2024) 28 g 0    methocarbamol (ROBAXIN) 500 MG tablet Take 1 tablet (500 mg) by mouth 4 times daily 120 tablet 0     No current facility-administered medications for this visit.         Tobacco History:  reports that he quit smoking about 40 years ago. His smoking use included cigarettes. He started smoking about 40 years ago. He has been exposed to tobacco smoke. He has never used smokeless tobacco.       REVIEW OF SYMPTOMS:   The review of systems was negative except as noted in the HPI.           PHYSICAL  EXAMINATION:     BP (!) 159/78   Pulse 55   SpO2 98%            GENERAL: The patient overall appears well and is no acute distress.   HEAD: normocephalic   EYES: Sclera and conjunctiva clear   NECK: no obvious masses   LUNGS: breathing is unlabored.   EXTREMITIES: No clubbing, cyanosis or edema   SKIN: No rashes or other abnormalities except as noted under the Wound section below.   NEUROLOGICAL: normal motor and sensory function       WOUND/ulcer: The wound appears healthy with no sign of infection.   Wound bed: necrotic material  Periwound: healthy intact skin  The wound is much smaller today and is healing well.  There was some loose necrotic material in the wound bed which I was able to clear away with a piece of gauze, no sharp debridement was required.      Also see below for wound details:     Circumferential volume measures:             No data to display                Ulceration(s)/Wound(s):   Please see the media tab under the chart review for pictures of the wounds.  Nursing staff removed dressings and cleansed wound.    VASC Wound right BKA (Active)   Pre Size Length 1 09/30/24 0800   Pre Size Width 1.5 09/30/24 0800   Pre Size Depth 0.4 09/30/24 0800   Pre Total Sq cm 1.5 09/30/24 0800           Recent Labs   Lab Test 05/03/24  1105 01/09/24  0932 08/10/23  0710   A1C 5.9* 5.9* 6.7*          Recent Labs   Lab Test 05/10/24  0744 05/09/24  1939 01/05/24  1208   ALBUMIN 4.2 4.1 4.5              No sharp debridement performed today.                  ASSESSMENT:   This is a 63 year old  male with a right BKA wound          PLAN:   He will bandage the area with Hydrofera Blue, silicone adhesive bandage and a Tubigrip stocking change once a day or every other day depending on the drainage and his bathing schedule.  I have encouraged him not to begin using his prosthesis until this wound is healed.  He can begin using a stump  in place of the Tubigrip stocking to control the swelling in the residual  limb.  He can also be measured for his prosthesis but I still do not want him to begin using the prosthesis until the wound is completely healed.    I have encouraged the patient to continue on their high protein diet to aid in wound healing.   The patient will return to the wound clinic in 3 to 4 weeks to see me again.        30 minutes spent on the date of the encounter doing chart review, history and exam, documentation and further activities per the note, this time excludes any procedure time      Sharif Up MD  09/30/2024   9:16 AM   [facility]  [phone]    This note was electronically signed by Sharif Up MD

## 2024-09-30 NOTE — LETTER
[facility]  [address] [place] [address] [address], [place] [address]  [phone]      Fax [phone]    [facility]           FAX: [phone]            Customer Service: [phone]        Account #: [id number]    Wound Dressing Rx and Order Form  Order Status: New  Verbal: Kristie  Date: 2024     Erwin Aguilar  Gender: male  : 1960  [address]   [place] [address]  [phone] (home)     Medical Record: [id number]  Primary Care Provider: Wegener, Joel Daniel Irwin      ICD-10-CM    1. Non-healing surgical wound, subsequent encounter  T81.89XD Wound care            Insurance Info:  INSURER: Payor: MEDICARE / Plan: MEDICARE / Product Type: Medicare /   Policy ID#:  [id number]  SECONDARY INSURANCE:    Secondary Policy ID#:  N/A        Physician Info:   Name:  JERZY CASH     Dept Address/Phones:   [address] [place] [address], [address] [id number] [phone]  Fax: [phone]    Lymphedema circumferential measurements (in cm):       No data to display                  Wound info:  VASC Wound right BKA (Active)   Pre Size Length 1 24 0800   Pre Size Width 1.5 24 0800   Pre Size Depth 0.4 24 0800   Pre Total Sq cm 1.5 24 0800   Number of days: 21       Incision/Surgical Site 12 Mid;Anterior Abdomen (Active)   Number of days: 4344       Incision/Surgical Site 18 Bilateral Back (Active)   Number of days: 2286       Incision/Surgical Site 18 Right Buttocks (Active)   Number of days: 2286       Incision/Surgical Site Lower;Right Back (Active)   Number of days:        Incision/Surgical Site 20 Right Heel (Active)   Number of days: 1419       Incision/Surgical Site 23 Left Elbow (Active)   Number of days: 396       Incision/Surgical Site 23 Left Wrist (Active)   Number of days: 396       Incision/Surgical Site 24 Anterior;Left Thigh (Active)   Number of days: 160        Incision/Surgical Site 05/09/24 Right Tibial (Active)   Number of days: 144        Drainage: moderate  Thickness:  full  Duration of Need: 30 DAYS  Days Supply: 30 DAYS  Start Date: 9/30/2024  Starter Kit, Ancillary Kit (saline, gloves, gauze): Yes   Qualifying wound/Debridement: Yes     NO SUBSTITUTIONS. Call [phone]. Please call patient for out-of-pocket costs and options.      Dressing Type Brand Size Frequency of change  Quantity   Primary Zetuvit silicone border  3x3 in EVERY OTHER DAY and as needed      NO SUBSTITUTIONS. Call [phone] with questions.       OK to forward to covered supplier.    Electronically Signed Physician:  JERZY CASH             Date: September 30, 2024

## 2024-10-01 ENCOUNTER — TELEPHONE (OUTPATIENT)
Dept: FAMILY MEDICINE | Facility: CLINIC | Age: 64
End: 2024-10-01
Payer: MEDICARE

## 2024-10-01 ENCOUNTER — MEDICAL CORRESPONDENCE (OUTPATIENT)
Dept: HEALTH INFORMATION MANAGEMENT | Facility: CLINIC | Age: 64
End: 2024-10-01
Payer: MEDICARE

## 2024-10-01 NOTE — TELEPHONE ENCOUNTER
Received signed Orders from FLORINDA  #[id number]- 9/19/24 Wound cleanse Instructions  #[id number]-Tb to Discharge Home Health services per Patient Request    #[id number]-- Home Health Certification and Plan Of Care  9/14/24--11/12/24  From [facility]    Faxed to [phone] and sent for Scanning  [facility] Coordinator

## 2024-10-01 NOTE — TELEPHONE ENCOUNTER
Faxed and sent for scanning  [facility] Coordinator

## 2024-10-03 ENCOUNTER — TELEPHONE (OUTPATIENT)
Dept: FAMILY MEDICINE | Facility: CLINIC | Age: 64
End: 2024-10-03

## 2024-10-03 NOTE — TELEPHONE ENCOUNTER
[facility] calling stating pt's pulse was at 49 during home visit today. No other concerns noted.  Home care will also be discharging pt from home care services.     Please call [facility] with any questions at [phone]. Thanks    Anayeli Harris RN  [facility]

## 2024-10-10 ENCOUNTER — PATIENT OUTREACH (OUTPATIENT)
Dept: CARE COORDINATION | Facility: CLINIC | Age: 64
End: 2024-10-10
Payer: MEDICARE

## 2024-10-10 NOTE — PROGRESS NOTES
Clinic Care Coordination Contact  Care Team Conversations    Spoke briefly to pt who states now is not a good time to talk. Recommends CHW call him sometime tomorrow, 10/11/24.    CHW Plan: CHW will call pt on 10/11/24, per pt request, to discuss information about WC repair and rental from [facility].     Michell Del Cid  Community Health Worker  [facility]  [phone]

## 2024-10-10 NOTE — PROGRESS NOTES
REQUISITION AND JUSTIFICATION FOR DURABLE MEDICAL EQUIPMENT    Patient Name:  Erwin Aguilar  MR #:  [id number]  :  1960  Age/Gender:  63 year old male  Visit Date:  Erwin Aguilar seen for seating and wheeled mobility evaluation by BEATRIZ Dillon/L, ATP and ATP from [facility] on 2024.    CLINICAL CRITERIA FOR MOBILITY ASSISTIVE EQUIPMENT  Coverage Criteria Per Local Coverage Determination    A) Erwin has mobility limitations due to chronic pain, type 2 diabetes, lumbosacral radiculopathy, cubital tunnel, carpal tunnel, neuropathy, CRPS, arthrosclerosis, asthma, gastroparesis, spinal cord stimulator, right below-knee amputation with significant surgical history that significantly impairs his ability to participate in all of his mobility-related activities of daily living (MRADL). Specifically affected are toileting (being able to get there in time to prevent accidents), dressing, and bathing (getting into the bathroom of designated place). Current equipment used is private pay manual wheelchair that is ill fitting and broken. This patient needs the new equipment requested to be able to continue to allow for safe and independent participation in MRADLS. Please see additional documentation in the seating and wheeled mobility report for details.   Erwin had a successful clinical trial here, and also a successful trial at home with the recommended equipment. Erwin is very willing and physically / cognitively able to use the recommended equipment to assist him with mobility-related activities of daily living and general mobility.   B) Erwin's mobility limitation cannot be sufficiently and safely resolved by the use of an appropriately fitted cane or walker because he is nonambulatory due to amputation. Strength of legs is WFL for one maximal repetition. Fatigue also impacts this patient's ability to ambulate, regardless of the gait aid.    C) Erwin does not have sufficient upper extremity function  to self-propel a k1-4 manual wheelchair and requires an optimally-configured k5 ultralight weight manual wheelchair in his home to perform MRADLs during a typical day due to limitations in strength, endurance, range of motion, and coordination.  Patient has pain with propulsion due to improper set up of center of gravity and maneuverability.  Strength of arms is WFL.  D)  The need for this equipment is LIFETIME.     RECOMMENDATIONS FOR MOBILITY BASE, SEATING SYSTEM AND COMPONENTS  Aero T ultra lightweight manual wheelchair - this ultra light weight manual wheelchair is appropriate and necessary for him to be able to assist and complete all of his MRADLs within his residence. He has mobility limitations impacting hhis ability to ambulate independently or with any ambulation aid. He has had a thorough clinical evaluation, and this manual wheelchair is the best option for this patient.  Any less costly wheelchair option would be unable to accommodate anterior-posterior axle adjustments to maximize propulsion mechanics required for shoulder preservation in full-time, active manual wheelchair propeller.      Light speed plastic soft roll casters - for smooth ride not to jar/shake them    Flip back foot rest footrests - for proper leg support to be moved out of the way with safe transfers    Heel loops-needed for rearward foot support safely keeping legs on footplate's with use    Integrated fold-down push Handles- two handles mounted to the backrest frame to allow the wheelchair to be pushed by a caregiver if the wheelchair user is unable to move the wheelchair (fine tuning position of wheelchair for vehicle transport, etc.) .    Primo sentinel tires - for jarring free mobility allowing optimal propulsion mechanics    Aluminum rigid removable standard side guards-safely supports hips keeping them aligned and preventing skin then clothing from getting stuck in wheels with use     Rear anti-tip tubes - for safety to  prevent w/c tipping rearward    Pelvic positioning strap - to assist maintaining pelvis position for functional activities    Axiom sp seat cushion - this pressure distribution and positioning seat cushion will optimally  distribute seating pressures to prevent pressure ulcers, but also provide a stable base of support for him to use during MRADLs.    Axiom solid-state insert-solid bore placed under cushion to prevent splaying of upholstery and thus rotation of hips alignment and pain  Breathable upholstery padded back support - contoured back support is needed to support Erwin's thoracolumbar area in an upright and midline position, with appropriate support pads as needed. This back support is essential to provide sufficient posterior support to maximize his postural alignment and minimize his tendency to develop scoliosis and other secondary complications.    This equipment is reasonable and necessary with reference to accepted standards of medical practice and treatment of this patient's condition and is not being recommended as a convenience item. Without this recommended equipment, he is highly likely to sustain injuries from falls, develop pressure sores or postural compensation, and/or be bed confined, which those costs far exceed the cost of the requested equipment.    Electronically signed by:  Moni CHACKO ATP      Occupational Therapist, Assistive   [phone]      fax: [phone]      [email or web address] [place] [address]  October 10, 2024    I have read and concur with the above recommendations.    Physician Printed Name __________________________________________    Physician SIgnature  _____________________________________________    Date of SIgnature ______________________________    Physician Phone  ______________________________

## 2024-10-10 NOTE — LETTER
[facility] CARE COORDINATION  [address]  [facility] [address]    October 11, 2024    Erwin Aguilar  [address]   [place] [address]      Dear Erwin,    I am a clinic community health worker who works with Joel Daniel Wegener, MD with the [facility]. I wanted to thank you for spending the time to talk with me.  Below is a description of clinic care coordination and how I can further assist you.       The clinic care coordination team is made up of a registered nurse, , financial resource worker and community health worker who understand the health care system. The goal of clinic care coordination is to help you manage your health and improve access to the health care system. Our team works alongside your provider to assist you in determining your health and social needs. We can help you obtain health care and community resources, providing you with necessary information and education. We can work with you through any barriers and develop a care plan that helps coordinate and strengthen the communication between you and your care team.  Our services are voluntary and are offered without charge to you personally.      Here is the information I received from two individuals at [employer] in [place]:    Repair of broken wheelchair seat:   [facility] states you can bring your chair to their store at   [address] [place] [id number], for a 15-min inspection which would cost $30/15 min. Most inspections do not take longer than 15 min. No appointment needed. Based on the inspection, a recommendation would be made. Often times parts cost >$80.    Rental of wheelchairs:  [facility] also states [employer] can rent wheelchairs  Standard wheelchair rental - $100/mo  Heavy Duty (based on pt weight) wheelchair rental - $200/mo  Wheelchair rentals may be covered by insurance, however, PCP would have to perform a face-to-face visit and  in progress note states why a wheelchair is needed, why a cane or walker would not work, confirm pt is able to use the wheelchair in his home, and that pt is able to propel the wheelchair independently.       Ideas from Nga in the complex rehab department at [facility]:  Nga recommends a couple of ideas. Contact the vendor of the custom wheelchair company to see if they can provide a new seat for your current wheelchair - it would likely be private pay. Or you could order a new seat online.  The other idea Nga had is to see if the vendor of the custom wheelchair had a loaner chair you could use before custom wheelchair is ready for use.      Please feel free to contact me with any questions or concerns regarding care coordination and what we can offer.      We are focused on providing you with the highest-quality healthcare experience possible.      Sincerely,     Michell Del Cid  Community Health Worker  [facility]  [phone]

## 2024-10-11 ENCOUNTER — MYC MEDICAL ADVICE (OUTPATIENT)
Dept: FAMILY MEDICINE | Facility: CLINIC | Age: 64
End: 2024-10-11
Payer: MEDICARE

## 2024-10-11 NOTE — PROGRESS NOTES
Clinic Care Coordination Contact  Community Health Worker Initial Outreach    Chart Review: PCP referral from Soraya Guthrie MD, on 9/24/24:  Financial support - other - affordability of medical supplies  My Clinical Question is : medical supply affordability and coordination     See 9/26/24 and 9/27/24 conversations documented in [facility] (under 9/25/24 patient outreach encounter).        Patient accepts CC: No, main concerns for transportation, medications, and fixing wheelchair seat have been addressed. Patient will be sent Care Coordination introduction letter for future reference.     Spoke briefly to pt this date:  Custom wheelchair - pt is pleased that the justification paperwork needed by Medicare regarding his custom wheelchair is moving forward. This form needs to be signed by Dr. Wegener and per [facility] Advice dated today, this is in progress.   Broken seat on present wheelchair - CHW was able to communicate with pt what both [facility] and Nga, in the complex rehab department, at [facility] recommended regarding getting a new wheelchair seat, repairing wheelchair seat, or renting a wheelchair. See below.  Pt thanks CHW for her looking into these options for his broken wheelchair seat. [facility] is located close to where pt lives. He plans to go to [facility] next week to inquire further, and to have an inspection done on his present chair. Pt states is the cost is too much for repairing the seat, he will just make due with his present wheelchair.      Information sent to pt via [employer] this date:    Here is the information I received from two individuals at [employer] [place] in [place]:    Repair of broken wheelchair seat:   [facility] states you can bring your chair to their store at   [address] [place] [address], for a 15-min inspection which would cost $30/15 min. Most inspections do not take longer than 15 min. No appointment needed. Based on the inspection, a  recommendation would be made. Often times parts cost >$80.    Rental of wheelchairs:  [facility] also states [facility] can rent wheelchairs  Standard wheelchair rental - $100/mo  Heavy Duty (based on pt weight) wheelchair rental - $200/mo  Wheelchair rentals may be covered by insurance, however, PCP would have to perform a face-to-face visit and in progress note states why a wheelchair is needed, why a cane or walker would not work, confirm pt is able to use the wheelchair in his home, and that pt is able to propel the wheelchair independently.       Ideas from Nga in the complex rehab department at [facility]:  Nga recommends a couple of ideas. Contact the vendor of the custom wheelchair company to see if they can provide a new seat for your current wheelchair - it would likely be private pay. Or you could order a new seat online.  The other idea Nga had is to see if the vendor of the custom wheelchair had a loaner chair you could use before custom wheelchair is ready for use.      Michell Del Cid  Community Health Worker  [facility]  [phone]

## 2024-10-15 ENCOUNTER — MEDICAL CORRESPONDENCE (OUTPATIENT)
Dept: HEALTH INFORMATION MANAGEMENT | Facility: CLINIC | Age: 64
End: 2024-10-15
Payer: MEDICARE

## 2024-10-15 NOTE — TELEPHONE ENCOUNTER
Forms/Letter Request    Type of form/letter: DME (wheelchair, hospital bed)    Type of DME requested: manual wheelchair   Review attached PT/OT eval - needs signature  Sign and date LMN  Sign and date the practitioner's standard written order    Do we have the form/letter: Yes: at JW's office    Who is the form from? [employer] (if other please explain)    Where did/will the form come from? form was faxed in    When is form/letter needed by: asap    How would you like the form/letter returned: Fax : [phone]

## 2024-10-21 ENCOUNTER — MYC REFILL (OUTPATIENT)
Dept: FAMILY MEDICINE | Facility: CLINIC | Age: 64
End: 2024-10-21

## 2024-10-21 ENCOUNTER — OFFICE VISIT (OUTPATIENT)
Dept: VASCULAR SURGERY | Facility: CLINIC | Age: 64
End: 2024-10-21
Attending: FAMILY MEDICINE
Payer: MEDICARE

## 2024-10-21 VITALS — HEART RATE: 48 BPM | OXYGEN SATURATION: 99 % | DIASTOLIC BLOOD PRESSURE: 73 MMHG | SYSTOLIC BLOOD PRESSURE: 143 MMHG

## 2024-10-21 DIAGNOSIS — T81.89XD NON-HEALING SURGICAL WOUND, SUBSEQUENT ENCOUNTER: Primary | ICD-10-CM

## 2024-10-21 DIAGNOSIS — Z98.890 H/O FOOT SURGERY: ICD-10-CM

## 2024-10-21 DIAGNOSIS — M25.571 CHRONIC PAIN OF RIGHT ANKLE: ICD-10-CM

## 2024-10-21 DIAGNOSIS — M65.28 CALCIFIC ACHILLES TENDINITIS: ICD-10-CM

## 2024-10-21 DIAGNOSIS — G89.4 CHRONIC PAIN SYNDROME: ICD-10-CM

## 2024-10-21 DIAGNOSIS — M21.6X1 ACQUIRED CALCANEUS DEFORMITY OF RIGHT ANKLE: ICD-10-CM

## 2024-10-21 DIAGNOSIS — S86.011A RUPTURE OF RIGHT ACHILLES TENDON, INITIAL ENCOUNTER: ICD-10-CM

## 2024-10-21 DIAGNOSIS — M79.671 INTRACTABLE RIGHT HEEL PAIN: ICD-10-CM

## 2024-10-21 DIAGNOSIS — G89.29 CHRONIC PAIN OF RIGHT ANKLE: ICD-10-CM

## 2024-10-21 PROCEDURE — 99214 OFFICE O/P EST MOD 30 MIN: CPT | Performed by: FAMILY MEDICINE

## 2024-10-21 PROCEDURE — G0463 HOSPITAL OUTPT CLINIC VISIT: HCPCS | Performed by: FAMILY MEDICINE

## 2024-10-21 RX ORDER — OXYCODONE HYDROCHLORIDE 10 MG/1
10 TABLET ORAL 3 TIMES DAILY PRN
Qty: 90 TABLET | Refills: 0 | Status: SHIPPED | OUTPATIENT
Start: 2024-10-21 | End: 2024-10-25

## 2024-10-21 ASSESSMENT — PAIN SCALES - GENERAL: PAINLEVEL: MILD PAIN (3)

## 2024-10-21 NOTE — PROGRESS NOTES
Wound Clinic Note          Visit date: 10/21/2024       Cheif Complaint:     Erwin Aguilar is a 64 year old  male had concerns including Wound Check.  He has a right BKA wound.      HISTORY OF PRESENT ILLNESS:    Erwin Aguilar reports the ulcer has been present since late May 2024.  The wound began after a recent surgery and the incision did not heal normally.      He had a right BKA performed on May 9, 2024.  Postoperatively a portion of the incision dehisced.  He also has a significant history of peripheral artery disease and according to the patient he had stents placed in his femoral arteries prior to his BKA surgery.  He is now scheduled to see vascular surgery for follow-up in December.      The patient has been bandaging the wound with Hydrofera Blue, silicone adhesive bandage and his compression stocking changed once a day or every other day.  There has been no drainage from the wound for at least a week.     He does not yet have a prosthetic.  But he is planning to get measured for his prosthesis in the next week or so.      The pateint denies fevers or chills.  They report the pain from the wound has been 0/10 and has remained about the same recently.      Today the patient reports maintaining a high protein diet, but has not been taking protein supplements lately.        The patient denies a history of smoking or chronic steroid use.  The patient confirms having diabetes and reports the blood sugars are well controlled.         The patient has not had any symptoms of infection relating to the wound recently and is not currently on antibiotics.       Problem List:   Past Medical History:   Diagnosis Date    Actinic keratosis     aldara    Anxiety 12/1997    Cancer (H)     squamous cell skin CA    Cauda equina spinal cord injury (H)     Chronic sinusitis 05/01/2016    Cirrhosis of liver (H) 04/01/2014    Not biopsy-proven as of 4/1/14.      Depressive disorder     Diastasis recti     Esophageal  reflux     Esophageal varices in cirrhosis (H) 04/01/2014    Hospitalized for UGI blee 3/28/14, endoscopy revealed bleeding varices.    Essential hypertension, benign     Intermittent asthma     Mild depression     Mixed hyperlipidemia     Nasal polyps 05/01/2016    Osteoarthritis of lumbar spine, unspecified spinal osteoarthritis complication status     Other chronic pain     Paroxysmal atrial fibrillation (H) 09/22/2021    SCCA (squamous cell carcinoma) of skin     Seasonal allergic conjunctivitis     Type II or unspecified type diabetes mellitus without mention of complication, not stated as uncontrolled     Unspecified site of spinal cord injury without evidence of spinal bone injury     due to back surgery              Family Hx: family history includes Breast Cancer in his mother; Cancer in his father and mother; Diabetes in his brother, brother, and brother; Other Cancer in his mother; Snoring in his father.       Surgical Hx:   Past Surgical History:   Procedure Laterality Date    ABDOMEN SURGERY  2014    AMPUTATE LEG BELOW KNEE Right 5/9/2024    Procedure: Right below knee amputation (transtibial amputation);  Surgeon: Kurtis Nye MD;  Location: [facility]    ANGIOGRAM Right 4/23/2024    Procedure: Ultrasound guided percutaneous transarterial left common femoral artery access, diagnostic aortoiliac angiogram, selective cannulation of right comon iliac, righ external iliac, right common femoral, and right superficial femoral artery, balloon angioplasty of right superficial femoral artery, 6mm x 12 cm self-expanding drug-coated stent placement of right superficial femoral artery, lef    BACK SURGERY  August 2009    COLONOSCOPY N/A 5/12/2016    Procedure: COMBINED COLONOSCOPY, SINGLE OR MULTIPLE BIOPSY/POLYPECTOMY BY BIOPSY;  Surgeon: Ana Paula Urbina MD;  [facility]    DECOMPRESSION CUBITAL TUNNEL Left 8/31/2023    Procedure: LEFT CUBITAL TUNNEL RELEASE,;  Surgeon: Deny Dixon MD;  [facility]    ENDOSCOPY UPPER, COLONOSCOPY, COMBINED  10/19/2011    Procedure:COMBINED ENDOSCOPY UPPER, COLONOSCOPY; Upper Endoscopy, Colonoscopy with Polypectomy x4; Surgeon:AMBAR RODRÍGUEZ; Location: OR    ENT SURGERY  1-2016    Ongoing since then    ESOPHAGOSCOPY, GASTROSCOPY, DUODENOSCOPY (EGD), COMBINED  3/28/2014    Procedure: COMBINED ESOPHAGOSCOPY, GASTROSCOPY, DUODENOSCOPY (EGD);  EGD, Gastric Biopsies, Esophageal Banding;  Surgeon: Reyna Tovar MD;  [facility]    ESOPHAGOSCOPY, GASTROSCOPY, DUODENOSCOPY (EGD), COMBINED  6/9/2014    Procedure: COMBINED ESOPHAGOSCOPY, GASTROSCOPY, DUODENOSCOPY (EGD);  Surgeon: Curtis Mendez MD;  Location:  GI    ESOPHAGOSCOPY, GASTROSCOPY, DUODENOSCOPY (EGD), COMBINED  7/24/2014    Procedure: COMBINED ESOPHAGOSCOPY, GASTROSCOPY, DUODENOSCOPY (EGD);  Surgeon: Gerard Samaniego MD;  Location:  OR    ESOPHAGOSCOPY, GASTROSCOPY, DUODENOSCOPY (EGD), COMBINED N/A 10/31/2014    Procedure: COMBINED ESOPHAGOSCOPY, GASTROSCOPY, DUODENOSCOPY (EGD);  Surgeon: Gerard Samaniego MD;  Location:  OR    ESOPHAGOSCOPY, GASTROSCOPY, DUODENOSCOPY (EGD), COMBINED N/A 5/12/2016    Procedure: COMBINED ESOPHAGOSCOPY, GASTROSCOPY, DUODENOSCOPY (EGD);  Surgeon: Ana Paula Urbina MD;  Location:  GI    ESOPHAGOSCOPY, GASTROSCOPY, DUODENOSCOPY (EGD), COMBINED N/A 8/2/2018    Procedure: COMBINED ESOPHAGOSCOPY, GASTROSCOPY, DUODENOSCOPY (EGD);  EGD;  Surgeon: Yu Wagner MD;  Location:  GI    HCL SQUAMOUS CELL CARCINOMA AG  10/07    left forearm    HERNIORRHAPHY UMBILICAL  11/8/2012    Procedure: HERNIORRHAPHY UMBILICAL;  Open Umbilical Hernia Repair With Mesh ;  Surgeon: Chase Nicholson MD;  [facility]    INSERT STIMULATOR DORSAL COLUMN N/A 6/28/2018    Procedure: INSERT STIMULATOR DORSAL COLUMN;;  Surgeon: Elvis Sauceda MD;  [facility]    IR LOWER EXTREMITY ANGIOGRAM RIGHT  4/23/2024    neuro stimulator   2010    RELEASE CARPAL TUNNEL Left 8/31/2023    Procedure: LEFT OPEN CARPAL TUNNEL RELEASE,;  Surgeon: Deny Dixon MD;  Location: [facility] OR    RELEASE TRIGGER FINGER Left 8/31/2023    Procedure: Release left trigger finger thumb;  Surgeon: Deny Dixon MD;  Location: [facility] OR    REMOVE GENERATOR STIMULATOR (LOCATION) N/A 6/28/2018    Procedure: REMOVE GENERATOR STIMULATOR (LOCATION);  Spinal Cord Stimulator and IPG Explant and Re-Implant of SCS System (Leads and IPG);  Surgeon: Elvis Sauceda MD;  [facility]    REPAIR TENDON ACHILLES Right 11/11/2020    Procedure: Right achilles debridement and partial calcaneus excision;  Surgeon: Eh Sandoval MD;  [facility]: [facility] OR    SURGICAL HISTORY OF -   1/02    abcess tooth    SURGICAL HISTORY OF -   1999    L4-5 laminectomy, cauda equina syndrome    SURGICAL HISTORY OF -   +    herniated disk repair    TONSILLECTOMY  10 1964    TRANSPOSITION ULNAR NERVE (ELBOW)      right    ZZC APPENDECTOMY  1974          Allergies:    Allergies   Allergen Reactions    Levaquin Anaphylaxis and Rash     Swelling in lip and tongue felt thick    Lisinopril Anaphylaxis     Swollen tongue; inability to swallow; drooling; hives; swollen face, neck, angioedema    Acetaminophen      Hx of cirrhosis     Amlodipine      Swelling, hives, possible angioedema      Morphine      b/p dropped and arms went numb  Hypotension    Quinolones Hives    Spironolactone Unknown     Pt believes himself to be allergic to it; cannot find his old records of this.-c     Bactrim [Sulfamethoxazole-Trimethoprim] Rash              Medication History:    Current Outpatient Medications   Medication Sig Dispense Refill    albuterol (PROAIR HFA/PROVENTIL HFA/VENTOLIN HFA) 108 (90 Base) MCG/ACT inhaler INHALE 2 PUFFS BY MOUTH EVERY 6 HOURS AS NEEDED FOR SHORTNESS OF BREATH OR WHEEZING (Patient taking differently: 2 puffs every 6 hours as needed. INHALE 2 PUFFS BY MOUTH EVERY 6 HOURS AS NEEDED FOR  SHORTNESS OF BREATH OR WHEEZING) 18 g 1    apixaban ANTICOAGULANT (ELIQUIS ANTICOAGULANT) 5 MG tablet Take 1 tablet (5 mg) by mouth 2 times daily 180 tablet 3    aspirin 81 MG EC tablet Take 1 tablet (81 mg) by mouth daily 84 tablet 0    baclofen (LIORESAL) 10 MG tablet Take 2 tablets (20 mg) by mouth 3 times daily. 360 tablet 3    bisacodyl (DULCOLAX) 5 MG EC tablet Two days prior to exam take two (2) tablets at 4pm. One day prior to exam take two (2) tablets at 4pm 4 tablet 0    blood glucose monitoring (FREESTYLE FREEDOM LITE) meter device kit Use to test blood sugar 3 times daily or as directed. 1 kit 0    Continuous Blood Gluc  (FREESTYLE CACHORRO 2 READER) GASTON USE TO READ BLOOD SUGARS AS PER 'S INSTRUCTIONS 1 each 0    doxazosin (CARDURA) 8 MG tablet Take 1 tablet (8 mg) by mouth at bedtime 90 tablet 1    gabapentin (NEURONTIN) 300 MG capsule Take 600 mg by mouth in the afternoon and 300 mg at bedtime 270 capsule 3    hydrOXYzine HCl (ATARAX) 25 MG tablet Take 1 tablet (25 mg) by mouth every 6 hours as needed for other (adjuvant pain) 30 tablet 0    insulin glargine (LANTUS SOLOSTAR) 100 UNIT/ML pen INJECT 26 UNITS SUBCUTANEOUSLY AT BEDTIME (Patient taking differently: 26 Units at bedtime. INJECT 26 UNITS SUBCUTANEOUSLY AT BEDTIME) 15 mL 1    insulin pen needle (GLOBAL EASE INJECT PEN NEEDLES) 32G X 4 MM miscellaneous USE AS DIRECTED EVERY  each 1    medical cannabis (Patient's own supply) See Admin Instructions (The purpose of this order is to document that the patient reports taking medical cannabis.  This is not a prescription, and is not used to certify that the patient has a qualifying medical condition.)   Flower - PRN      ondansetron (ZOFRAN) 4 MG tablet Take 1 tablet (4 mg) by mouth 2 times daily as needed for nausea. 180 tablet 3    oxyCODONE (ROXICODONE) 5 MG tablet Take 1 tablet (5 mg) by mouth 5 times daily As needed for post op pain in addition to 10mg three times daily  scheduled. 30 tablet 0    oxyCODONE IR (ROXICODONE) 10 MG tablet Take 1 tablet (10 mg) by mouth 3 times daily as needed for severe pain. 90 tablet 0    polyethylene glycol (GOLYTELY) 236 g suspension Two days before procedure at 5PM fill first container with water. Mix and drink an 8 oz glass every 15 minutes until HALF of the container is gone. Place the remainder in the refrigerator. One day before procedure at 5PM drink second half of bowel prep. Drink an 8 oz glass every 15 minutes until it is gone. Day of procedure 6 hours before arrival time fill the 2nd container with water. Mix and drink an 8 oz glass every 15 minutes until HALF of the container is gone. Discard the remaining solution. 8000 mL 0    promethazine (PHENERGAN) 25 MG tablet TAKE 1 TABLET BY MOUTH 3 TIMES A DAY AS NEEDED FOR NAUSEA 270 tablet 0    propranolol (INDERAL) 40 MG tablet Take 1 tablet (40 mg) by mouth 3 times daily 270 tablet 2    rosuvastatin (CRESTOR) 40 MG tablet Take 1 tablet (40 mg) by mouth daily 90 tablet 3    sertraline (ZOLOFT) 100 MG tablet TAKE 2 TABLETS BY MOUTH DAILY 180 tablet 2    triamterene-HCTZ (DYAZIDE) 37.5-25 MG capsule Take 1 capsule by mouth every morning 90 capsule 1    Lidocaine (LIDOCARE) 4 % Patch Place 1 patch onto the skin every 24 hours To prevent lidocaine toxicity, patient should be patch free for 12 hrs daily. (Patient not taking: Reported on 6/4/2024) 10 patch 0    lidocaine (LMX4) 4 % external cream Apply topically once as needed for mild pain (Patient not taking: Reported on 6/4/2024) 28 g 0    methocarbamol (ROBAXIN) 500 MG tablet Take 1 tablet (500 mg) by mouth 4 times daily 120 tablet 0     No current facility-administered medications for this visit.         Tobacco History:  reports that he quit smoking about 40 years ago. His smoking use included cigarettes. He started smoking about 41 years ago. He has been exposed to tobacco smoke. He has never used smokeless tobacco.       REVIEW OF SYMPTOMS:    The review of systems was negative except as noted in the HPI.           PHYSICAL EXAMINATION:     BP (!) 143/73   Pulse (!) 48   SpO2 99%            GENERAL: The patient overall appears well and is no acute distress.   HEAD: normocephalic   EYES: Sclera and conjunctiva clear   NECK: no obvious masses   LUNGS: breathing is unlabored.   EXTREMITIES: No clubbing, cyanosis or edema   SKIN: No rashes or other abnormalities except as noted under the Wound section below.   NEUROLOGICAL: normal motor and sensory function       WOUND/ulcer: Healed      Also see below for wound details:     Circumferential volume measures:             No data to display                Ulceration(s)/Wound(s):   Please see the media tab under the chart review for pictures of the wounds.  Nursing staff removed dressings and cleansed wound.    VASC Wound right BKA (Active)   Description scab 10/21/24 0900             Recent Labs   Lab Test 05/03/24  1105 01/09/24  0932 08/10/23  0710   A1C 5.9* 5.9* 6.7*          Recent Labs   Lab Test 05/10/24  0744 05/09/24  1939 01/05/24  1208   ALBUMIN 4.2 4.1 4.5              No sharp debridement performed today.                  ASSESSMENT:   This is a 64 year old  male with a healed right BKA wound          PLAN:   I recommend he continue to keep the area covered with a simple bandage change with bathing for another week or 2 to allow the new skin which is covered over the wound to thicken up and get stronger then no further bandages to be required after that.  He should continue to wear his compression stocking to control the swelling in the residual limb.  I am okay with him getting started with being measured for a prosthesis.  I recommend that he not start using the prosthesis for at least 2 weeks.    I have encouraged the patient to continue on their high protein diet to aid in wound healing.   The patient will return to the wound clinic on an as-needed basis if further wounds develop.        30  minutes spent on the date of the encounter doing chart review, history and exam, documentation and further activities per the note, this time excludes any procedure time      Sharif Up MD  10/21/2024   9:37 AM   [facility]/Wound  [phone]    This note was electronically signed by Sharif Up MD

## 2024-10-21 NOTE — PATIENT INSTRUCTIONS
Your wound is healed! Continue to monitor area and contact clinic if area shows signs of reopening or you have other concerns related to your wound [phone].    Continue applying silicone foam adhesive for 2 more weeks and then you can discontinue.

## 2024-10-25 ENCOUNTER — MYC MEDICAL ADVICE (OUTPATIENT)
Dept: FAMILY MEDICINE | Facility: CLINIC | Age: 64
End: 2024-10-25
Payer: MEDICARE

## 2024-10-25 DIAGNOSIS — E11.9 TYPE 2 DIABETES MELLITUS WITHOUT COMPLICATION, WITH LONG-TERM CURRENT USE OF INSULIN (H): ICD-10-CM

## 2024-10-25 DIAGNOSIS — E11.42 TYPE 2 DIABETES MELLITUS WITH DIABETIC POLYNEUROPATHY, WITH LONG-TERM CURRENT USE OF INSULIN (H): ICD-10-CM

## 2024-10-25 DIAGNOSIS — Z79.4 TYPE 2 DIABETES MELLITUS WITH DIABETIC POLYNEUROPATHY, WITH LONG-TERM CURRENT USE OF INSULIN (H): ICD-10-CM

## 2024-10-25 DIAGNOSIS — M65.28 CALCIFIC ACHILLES TENDINITIS: ICD-10-CM

## 2024-10-25 DIAGNOSIS — G89.29 CHRONIC PAIN OF RIGHT ANKLE: ICD-10-CM

## 2024-10-25 DIAGNOSIS — G89.4 CHRONIC PAIN SYNDROME: ICD-10-CM

## 2024-10-25 DIAGNOSIS — M25.571 CHRONIC PAIN OF RIGHT ANKLE: ICD-10-CM

## 2024-10-25 DIAGNOSIS — M79.671 INTRACTABLE RIGHT HEEL PAIN: ICD-10-CM

## 2024-10-25 DIAGNOSIS — M21.6X1 ACQUIRED CALCANEUS DEFORMITY OF RIGHT ANKLE: ICD-10-CM

## 2024-10-25 DIAGNOSIS — Z79.4 TYPE 2 DIABETES MELLITUS WITHOUT COMPLICATION, WITH LONG-TERM CURRENT USE OF INSULIN (H): ICD-10-CM

## 2024-10-25 DIAGNOSIS — S86.011A RUPTURE OF RIGHT ACHILLES TENDON, INITIAL ENCOUNTER: ICD-10-CM

## 2024-10-25 DIAGNOSIS — Z98.890 H/O FOOT SURGERY: ICD-10-CM

## 2024-10-25 RX ORDER — OXYCODONE HYDROCHLORIDE 10 MG/1
10 TABLET ORAL 3 TIMES DAILY PRN
Qty: 90 TABLET | Refills: 0 | Status: SHIPPED | OUTPATIENT
Start: 2024-10-25

## 2024-10-29 ENCOUNTER — MYC MEDICAL ADVICE (OUTPATIENT)
Dept: FAMILY MEDICINE | Facility: CLINIC | Age: 64
End: 2024-10-29
Payer: MEDICARE

## 2024-10-29 DIAGNOSIS — M54.17 LUMBOSACRAL RADICULOPATHY: ICD-10-CM

## 2024-10-29 DIAGNOSIS — G89.4 CHRONIC PAIN SYNDROME: ICD-10-CM

## 2024-10-29 NOTE — TELEPHONE ENCOUNTER
Dr. Wegener,     Please see below Cloudy Days message and advise.   Pt requesting dosage change of gabapentin Rx    Thanks,   Brenda ARZATE RN

## 2024-10-30 RX ORDER — GABAPENTIN 300 MG/1
CAPSULE ORAL
Qty: 450 CAPSULE | Refills: 3 | Status: SHIPPED | OUTPATIENT
Start: 2024-10-30

## 2024-11-07 ENCOUNTER — MYC MEDICAL ADVICE (OUTPATIENT)
Dept: FAMILY MEDICINE | Facility: CLINIC | Age: 64
End: 2024-11-07
Payer: MEDICARE

## 2024-11-07 ENCOUNTER — DOCUMENTATION ONLY (OUTPATIENT)
Dept: PHYSICAL MEDICINE AND REHAB | Facility: CLINIC | Age: 64
End: 2024-11-07
Payer: MEDICARE

## 2024-11-07 DIAGNOSIS — Z89.511 ACQUIRED ABSENCE OF RIGHT LEG BELOW KNEE (H): Primary | ICD-10-CM

## 2024-11-08 ENCOUNTER — TELEPHONE (OUTPATIENT)
Dept: FAMILY MEDICINE | Facility: CLINIC | Age: 64
End: 2024-11-08
Payer: MEDICARE

## 2024-11-08 NOTE — TELEPHONE ENCOUNTER
Reviewed patients recent elevated blood pressure reading.  Per [place] Community Measures guidelines, patients blood pressure is out of parameters     Last Blood Pressure: 143/73  Last Heart Rate: 48  Date: 10/21/24  Location: [facility]      Pt is followed by PCP, Dr. Joel Daniel Wegener, who manages pt BP per notation in Snapshot.  Pt BP meds are ordered by PCP.  Pt has been doing virtual visits with PCP every couple of months.  Form AWV January, 2024, PCP notes:     Hypertension goal BP (blood pressure) < 140/90  Comment:   Plan: propranolol (INDERAL) 40 MG tablet        At goal.       Thanks,   ERNA Hinson

## 2024-11-11 ENCOUNTER — MYC MEDICAL ADVICE (OUTPATIENT)
Dept: ORTHOPEDICS | Facility: CLINIC | Age: 64
End: 2024-11-11
Payer: MEDICARE

## 2024-11-11 DIAGNOSIS — G56.02 CARPAL TUNNEL SYNDROME OF LEFT WRIST: ICD-10-CM

## 2024-11-11 DIAGNOSIS — G56.22 CUBITAL TUNNEL SYNDROME ON LEFT: Primary | ICD-10-CM

## 2024-11-11 NOTE — TELEPHONE ENCOUNTER
Left Voicemail (1st Attempt) and Sent Mychart (1st Attempt) for the patient to call back and schedule the following:    Appointment type: Return Hand/Wrist and EMG  Provider: Dr. Dixon  Return date: next available with Dr. Dixon, then an EMG, then another return hand/wrist with Dr. Dixon to go over the results of the EMG

## 2024-11-12 DIAGNOSIS — G56.02 CARPAL TUNNEL SYNDROME OF LEFT WRIST: ICD-10-CM

## 2024-11-12 DIAGNOSIS — G56.22 CUBITAL TUNNEL SYNDROME ON LEFT: Primary | ICD-10-CM

## 2024-11-21 ENCOUNTER — MYC REFILL (OUTPATIENT)
Dept: FAMILY MEDICINE | Facility: CLINIC | Age: 64
End: 2024-11-21
Payer: MEDICARE

## 2024-11-21 DIAGNOSIS — M21.6X1 ACQUIRED CALCANEUS DEFORMITY OF RIGHT ANKLE: ICD-10-CM

## 2024-11-21 DIAGNOSIS — G89.4 CHRONIC PAIN SYNDROME: ICD-10-CM

## 2024-11-21 DIAGNOSIS — G89.29 CHRONIC PAIN OF RIGHT ANKLE: ICD-10-CM

## 2024-11-21 DIAGNOSIS — M65.28 CALCIFIC ACHILLES TENDINITIS: ICD-10-CM

## 2024-11-21 DIAGNOSIS — Z98.890 H/O FOOT SURGERY: ICD-10-CM

## 2024-11-21 DIAGNOSIS — S86.011A RUPTURE OF RIGHT ACHILLES TENDON, INITIAL ENCOUNTER: ICD-10-CM

## 2024-11-21 DIAGNOSIS — M79.671 INTRACTABLE RIGHT HEEL PAIN: ICD-10-CM

## 2024-11-21 DIAGNOSIS — M25.571 CHRONIC PAIN OF RIGHT ANKLE: ICD-10-CM

## 2024-11-21 RX ORDER — OXYCODONE HYDROCHLORIDE 10 MG/1
10 TABLET ORAL 3 TIMES DAILY PRN
Qty: 90 TABLET | Refills: 0 | Status: SHIPPED | OUTPATIENT
Start: 2024-11-25

## 2024-11-25 ENCOUNTER — MYC MEDICAL ADVICE (OUTPATIENT)
Dept: FAMILY MEDICINE | Facility: CLINIC | Age: 64
End: 2024-11-25
Payer: MEDICARE

## 2024-11-25 DIAGNOSIS — E11.42 TYPE 2 DIABETES MELLITUS WITH DIABETIC POLYNEUROPATHY, WITH LONG-TERM CURRENT USE OF INSULIN (H): Primary | ICD-10-CM

## 2024-11-25 DIAGNOSIS — Z79.4 TYPE 2 DIABETES MELLITUS WITH DIABETIC POLYNEUROPATHY, WITH LONG-TERM CURRENT USE OF INSULIN (H): Primary | ICD-10-CM

## 2024-11-25 DIAGNOSIS — Z12.5 SCREENING FOR PROSTATE CANCER: ICD-10-CM

## 2024-11-25 DIAGNOSIS — D64.9 ANEMIA, UNSPECIFIED TYPE: ICD-10-CM

## 2024-11-25 DIAGNOSIS — E78.5 HYPERLIPIDEMIA LDL GOAL <100: ICD-10-CM

## 2024-12-21 ENCOUNTER — HEALTH MAINTENANCE LETTER (OUTPATIENT)
Age: 64
End: 2024-12-21

## 2024-12-21 ENCOUNTER — MYC REFILL (OUTPATIENT)
Dept: FAMILY MEDICINE | Facility: CLINIC | Age: 64
End: 2024-12-21
Payer: MEDICARE

## 2024-12-21 DIAGNOSIS — M21.6X1 ACQUIRED CALCANEUS DEFORMITY OF RIGHT ANKLE: ICD-10-CM

## 2024-12-21 DIAGNOSIS — Z98.890 H/O FOOT SURGERY: ICD-10-CM

## 2024-12-21 DIAGNOSIS — M65.28 CALCIFIC ACHILLES TENDINITIS: ICD-10-CM

## 2024-12-21 DIAGNOSIS — M25.571 CHRONIC PAIN OF RIGHT ANKLE: ICD-10-CM

## 2024-12-21 DIAGNOSIS — M79.671 INTRACTABLE RIGHT HEEL PAIN: ICD-10-CM

## 2024-12-21 DIAGNOSIS — S86.011A RUPTURE OF RIGHT ACHILLES TENDON, INITIAL ENCOUNTER: ICD-10-CM

## 2024-12-21 DIAGNOSIS — G89.4 CHRONIC PAIN SYNDROME: ICD-10-CM

## 2024-12-21 DIAGNOSIS — G89.29 CHRONIC PAIN OF RIGHT ANKLE: ICD-10-CM

## 2024-12-23 RX ORDER — OXYCODONE HYDROCHLORIDE 10 MG/1
10 TABLET ORAL 3 TIMES DAILY PRN
Qty: 90 TABLET | Refills: 0 | Status: SHIPPED | OUTPATIENT
Start: 2024-12-23

## 2024-12-31 ENCOUNTER — APPOINTMENT (OUTPATIENT)
Dept: CT IMAGING | Facility: CLINIC | Age: 64
DRG: 003 | End: 2024-12-31
Attending: NURSE PRACTITIONER
Payer: MEDICARE

## 2024-12-31 ENCOUNTER — APPOINTMENT (OUTPATIENT)
Dept: GENERAL RADIOLOGY | Facility: CLINIC | Age: 64
DRG: 003 | End: 2024-12-31
Attending: EMERGENCY MEDICINE
Payer: MEDICARE

## 2024-12-31 ENCOUNTER — APPOINTMENT (OUTPATIENT)
Dept: CARDIOLOGY | Facility: CLINIC | Age: 64
DRG: 003 | End: 2024-12-31
Attending: NURSE PRACTITIONER
Payer: MEDICARE

## 2024-12-31 ENCOUNTER — HOSPITAL ENCOUNTER (OUTPATIENT)
Facility: CLINIC | Age: 64
DRG: 003 | End: 2024-12-31
Payer: MEDICARE

## 2024-12-31 ENCOUNTER — HOSPITAL ENCOUNTER (INPATIENT)
Facility: CLINIC | Age: 64
LOS: 25 days | Discharge: NURSING, REHAB OR LONG-TERM CARE | DRG: 003 | End: 2025-01-25
Attending: EMERGENCY MEDICINE | Admitting: STUDENT IN AN ORGANIZED HEALTH CARE EDUCATION/TRAINING PROGRAM
Payer: MEDICARE

## 2024-12-31 ENCOUNTER — APPOINTMENT (OUTPATIENT)
Dept: GENERAL RADIOLOGY | Facility: CLINIC | Age: 64
DRG: 003 | End: 2024-12-31
Attending: STUDENT IN AN ORGANIZED HEALTH CARE EDUCATION/TRAINING PROGRAM
Payer: MEDICARE

## 2024-12-31 ENCOUNTER — APPOINTMENT (OUTPATIENT)
Dept: ULTRASOUND IMAGING | Facility: CLINIC | Age: 64
DRG: 003 | End: 2024-12-31
Attending: NURSE PRACTITIONER
Payer: MEDICARE

## 2024-12-31 ENCOUNTER — APPOINTMENT (OUTPATIENT)
Dept: GENERAL RADIOLOGY | Facility: CLINIC | Age: 64
DRG: 003 | End: 2024-12-31
Attending: NURSE PRACTITIONER
Payer: MEDICARE

## 2024-12-31 ENCOUNTER — APPOINTMENT (OUTPATIENT)
Dept: NEUROLOGY | Facility: CLINIC | Age: 64
DRG: 003 | End: 2024-12-31
Attending: NURSE PRACTITIONER
Payer: MEDICARE

## 2024-12-31 DIAGNOSIS — G89.29 CHRONIC PAIN OF RIGHT ANKLE: ICD-10-CM

## 2024-12-31 DIAGNOSIS — Z95.1 S/P CABG X 4: Primary | ICD-10-CM

## 2024-12-31 DIAGNOSIS — I21.3 ST ELEVATION MYOCARDIAL INFARCTION (STEMI), UNSPECIFIED ARTERY (H): ICD-10-CM

## 2024-12-31 DIAGNOSIS — S86.011A RUPTURE OF RIGHT ACHILLES TENDON, INITIAL ENCOUNTER: ICD-10-CM

## 2024-12-31 DIAGNOSIS — M65.28 CALCIFIC ACHILLES TENDINITIS: ICD-10-CM

## 2024-12-31 DIAGNOSIS — Z47.89 ORTHOPEDIC AFTERCARE: ICD-10-CM

## 2024-12-31 DIAGNOSIS — Z98.890 H/O FOOT SURGERY: ICD-10-CM

## 2024-12-31 DIAGNOSIS — G89.4 CHRONIC PAIN SYNDROME: ICD-10-CM

## 2024-12-31 DIAGNOSIS — I21.3 ST ELEVATION MI (STEMI) (H): ICD-10-CM

## 2024-12-31 DIAGNOSIS — M79.671 INTRACTABLE RIGHT HEEL PAIN: ICD-10-CM

## 2024-12-31 DIAGNOSIS — M25.571 CHRONIC PAIN OF RIGHT ANKLE: ICD-10-CM

## 2024-12-31 DIAGNOSIS — M21.6X1 ACQUIRED CALCANEUS DEFORMITY OF RIGHT ANKLE: ICD-10-CM

## 2024-12-31 DIAGNOSIS — R26.9 GAIT ABNORMALITY: ICD-10-CM

## 2024-12-31 LAB
ABO + RH BLD: NORMAL
ACT BLD: 154 SECONDS (ref 74–150)
ACT BLD: 162 SECONDS (ref 74–150)
ACT BLD: 166 SECONDS (ref 74–150)
ACT BLD: 166 SECONDS (ref 74–150)
ACT BLD: 186 SECONDS (ref 74–150)
ACT BLD: 235 SECONDS (ref 74–150)
ACT BLD: 284 SECONDS (ref 74–150)
ALBUMIN SERPL BCG-MCNC: 3.2 G/DL (ref 3.5–5.2)
ALBUMIN SERPL BCG-MCNC: 3.2 G/DL (ref 3.5–5.2)
ALBUMIN SERPL BCG-MCNC: 3.4 G/DL (ref 3.5–5.2)
ALBUMIN SERPL BCG-MCNC: 3.8 G/DL (ref 3.5–5.2)
ALBUMIN UR-MCNC: 50 MG/DL
ALLEN'S TEST: ABNORMAL
ALP SERPL-CCNC: 113 U/L (ref 40–150)
ALP SERPL-CCNC: 123 U/L (ref 40–150)
ALP SERPL-CCNC: 94 U/L (ref 40–150)
ALP SERPL-CCNC: 97 U/L (ref 40–150)
ALT SERPL W P-5'-P-CCNC: 18 U/L (ref 0–70)
ALT SERPL W P-5'-P-CCNC: 22 U/L (ref 0–70)
ALT SERPL W P-5'-P-CCNC: 23 U/L (ref 0–70)
ALT SERPL W P-5'-P-CCNC: <5 U/L (ref 0–70)
AMPHETAMINES UR QL SCN: ABNORMAL
ANION GAP SERPL CALCULATED.3IONS-SCNC: 10 MMOL/L (ref 7–15)
ANION GAP SERPL CALCULATED.3IONS-SCNC: 12 MMOL/L (ref 7–15)
ANION GAP SERPL CALCULATED.3IONS-SCNC: 18 MMOL/L (ref 7–15)
ANION GAP SERPL CALCULATED.3IONS-SCNC: 18 MMOL/L (ref 7–15)
APPEARANCE UR: CLEAR
APTT PPP: 61 SECONDS (ref 22–38)
APTT PPP: 63 SECONDS (ref 22–38)
APTT PPP: >240 SECONDS (ref 22–38)
AST SERPL W P-5'-P-CCNC: 165 U/L (ref 0–45)
AST SERPL W P-5'-P-CCNC: 172 U/L (ref 0–45)
AST SERPL W P-5'-P-CCNC: 21 U/L (ref 0–45)
AST SERPL W P-5'-P-CCNC: 79 U/L (ref 0–45)
ATRIAL RATE - MUSE: 68 BPM
BARBITURATES UR QL SCN: ABNORMAL
BASE EXCESS BLDA CALC-SCNC: -1.6 MMOL/L (ref -3–3)
BASE EXCESS BLDA CALC-SCNC: -2.2 MMOL/L (ref -3–3)
BASE EXCESS BLDA CALC-SCNC: -2.6 MMOL/L (ref -3–3)
BASE EXCESS BLDA CALC-SCNC: -2.8 MMOL/L (ref -3–3)
BASE EXCESS BLDA CALC-SCNC: -2.9 MMOL/L (ref -3–3)
BASE EXCESS BLDA CALC-SCNC: -3 MMOL/L (ref -3–3)
BASE EXCESS BLDA CALC-SCNC: -4 MMOL/L (ref -3–3)
BASE EXCESS BLDA CALC-SCNC: -5 MMOL/L (ref -3–3)
BASE EXCESS BLDV CALC-SCNC: -2.1 MMOL/L (ref -3–3)
BASE EXCESS BLDV CALC-SCNC: -3.4 MMOL/L (ref -3–3)
BASE EXCESS BLDV CALC-SCNC: -6 MMOL/L (ref -3–3)
BASOPHILS # BLD AUTO: 0 10E3/UL (ref 0–0.2)
BASOPHILS # BLD AUTO: 0 10E3/UL (ref 0–0.2)
BASOPHILS NFR BLD AUTO: 0 %
BASOPHILS NFR BLD AUTO: 1 %
BENZODIAZ UR QL SCN: ABNORMAL
BILIRUB SERPL-MCNC: 0.4 MG/DL
BILIRUB SERPL-MCNC: 0.4 MG/DL
BILIRUB SERPL-MCNC: 0.6 MG/DL
BILIRUB SERPL-MCNC: 0.7 MG/DL
BILIRUB UR QL STRIP: NEGATIVE
BLD GP AB SCN SERPL QL: NEGATIVE
BUN SERPL-MCNC: 4.7 MG/DL (ref 8–23)
BUN SERPL-MCNC: 5.2 MG/DL (ref 8–23)
BUN SERPL-MCNC: 5.3 MG/DL (ref 8–23)
BUN SERPL-MCNC: 5.4 MG/DL (ref 8–23)
BZE UR QL SCN: ABNORMAL
CA-I BLD-MCNC: 4.1 MG/DL (ref 4.4–5.2)
CA-I BLD-MCNC: 4.5 MG/DL (ref 4.4–5.2)
CA-I BLD-MCNC: 4.5 MG/DL (ref 4.4–5.2)
CA-I BLD-MCNC: 4.6 MG/DL (ref 4.4–5.2)
CALCIUM SERPL-MCNC: 7.9 MG/DL (ref 8.8–10.4)
CALCIUM SERPL-MCNC: 8.1 MG/DL (ref 8.8–10.4)
CALCIUM SERPL-MCNC: 8.1 MG/DL (ref 8.8–10.4)
CALCIUM SERPL-MCNC: 8.4 MG/DL (ref 8.8–10.4)
CANNABINOIDS UR QL SCN: ABNORMAL
CF REDUC 30M P MA P HEP LENFR BLD TEG: 0 % (ref 0–8)
CF REDUC 60M P MA P HEPASE LENFR BLD TEG: 0.9 % (ref 0–15)
CFT P HPASE BLD TEG: 1.9 MINUTE (ref 1–3)
CHLORIDE SERPL-SCNC: 105 MMOL/L (ref 98–107)
CHLORIDE SERPL-SCNC: 106 MMOL/L (ref 98–107)
CHLORIDE SERPL-SCNC: 109 MMOL/L (ref 98–107)
CHLORIDE SERPL-SCNC: 110 MMOL/L (ref 98–107)
CI (COAGULATION INDEX)(Z): -0.9 (ref -3–3)
CLOT ANGLE P HPASE BLD TEG: 62.4 DEGREES (ref 53–72)
CLOT INIT P HPASE BLD TEG: 8.1 MINUTE (ref 5–10)
CLOT STRENGTH P HPASE BLD TEG: 10.1 KD/SC (ref 4.5–11)
COLOR UR AUTO: ABNORMAL
CORTIS SERPL-MCNC: 23 UG/DL
CPB POCT: NO
CREAT SERPL-MCNC: 0.58 MG/DL (ref 0.67–1.17)
CREAT SERPL-MCNC: 0.59 MG/DL (ref 0.67–1.17)
CREAT SERPL-MCNC: 0.64 MG/DL (ref 0.67–1.17)
CREAT SERPL-MCNC: 0.66 MG/DL (ref 0.67–1.17)
CREAT UR-MCNC: 44 MG/DL
CRP SERPL-MCNC: 7.2 MG/L
D DIMER PPP FEU-MCNC: 2.95 UG/ML FEU (ref 0–0.5)
D DIMER PPP FEU-MCNC: 7.43 UG/ML FEU (ref 0–0.5)
DIASTOLIC BLOOD PRESSURE - MUSE: NORMAL MMHG
EGFRCR SERPLBLD CKD-EPI 2021: >90 ML/MIN/1.73M2
EOSINOPHIL # BLD AUTO: 0 10E3/UL (ref 0–0.7)
EOSINOPHIL # BLD AUTO: 0.1 10E3/UL (ref 0–0.7)
EOSINOPHIL NFR BLD AUTO: 0 %
EOSINOPHIL NFR BLD AUTO: 2 %
ERYTHROCYTE [DISTWIDTH] IN BLOOD BY AUTOMATED COUNT: 12.4 % (ref 10–15)
ERYTHROCYTE [DISTWIDTH] IN BLOOD BY AUTOMATED COUNT: 12.5 % (ref 10–15)
ERYTHROCYTE [DISTWIDTH] IN BLOOD BY AUTOMATED COUNT: 12.5 % (ref 10–15)
ERYTHROCYTE [DISTWIDTH] IN BLOOD BY AUTOMATED COUNT: 12.6 % (ref 10–15)
ERYTHROCYTE [DISTWIDTH] IN BLOOD BY AUTOMATED COUNT: 13.1 % (ref 10–15)
ERYTHROCYTE [SEDIMENTATION RATE] IN BLOOD BY WESTERGREN METHOD: 6 MM/HR (ref 0–20)
EST. AVERAGE GLUCOSE BLD GHB EST-MCNC: 117 MG/DL
FENTANYL UR QL: ABNORMAL
FIBRINOGEN PPP-MCNC: 353 MG/DL (ref 170–510)
GLUCOSE BLD-MCNC: 126 MG/DL (ref 70–99)
GLUCOSE BLD-MCNC: 158 MG/DL (ref 70–99)
GLUCOSE BLD-MCNC: 199 MG/DL (ref 70–99)
GLUCOSE BLD-MCNC: 92 MG/DL (ref 70–99)
GLUCOSE BLDC GLUCOMTR-MCNC: 112 MG/DL (ref 70–99)
GLUCOSE BLDC GLUCOMTR-MCNC: 114 MG/DL (ref 70–99)
GLUCOSE BLDC GLUCOMTR-MCNC: 158 MG/DL (ref 70–99)
GLUCOSE BLDC GLUCOMTR-MCNC: 158 MG/DL (ref 70–99)
GLUCOSE BLDC GLUCOMTR-MCNC: 198 MG/DL (ref 70–99)
GLUCOSE BLDC GLUCOMTR-MCNC: 86 MG/DL (ref 70–99)
GLUCOSE SERPL-MCNC: 130 MG/DL (ref 70–99)
GLUCOSE SERPL-MCNC: 171 MG/DL (ref 70–99)
GLUCOSE SERPL-MCNC: 205 MG/DL (ref 70–99)
GLUCOSE SERPL-MCNC: 95 MG/DL (ref 70–99)
GLUCOSE UR STRIP-MCNC: 70 MG/DL
HBA1C MFR BLD: 5.7 %
HCO3 BLD-SCNC: 19 MMOL/L (ref 21–28)
HCO3 BLD-SCNC: 21 MMOL/L (ref 21–28)
HCO3 BLD-SCNC: 22 MMOL/L (ref 21–28)
HCO3 BLD-SCNC: 23 MMOL/L (ref 21–28)
HCO3 BLDA-SCNC: 18 MMOL/L (ref 21–28)
HCO3 BLDA-SCNC: 25 MMOL/L (ref 21–28)
HCO3 BLDV-SCNC: 21 MMOL/L (ref 21–28)
HCO3 BLDV-SCNC: 21 MMOL/L (ref 21–28)
HCO3 BLDV-SCNC: 25 MMOL/L (ref 21–28)
HCO3 SERPL-SCNC: 17 MMOL/L (ref 22–29)
HCO3 SERPL-SCNC: 18 MMOL/L (ref 22–29)
HCO3 SERPL-SCNC: 20 MMOL/L (ref 22–29)
HCO3 SERPL-SCNC: 22 MMOL/L (ref 22–29)
HCT VFR BLD AUTO: 30.9 % (ref 40–53)
HCT VFR BLD AUTO: 32 % (ref 40–53)
HCT VFR BLD AUTO: 35 % (ref 40–53)
HCT VFR BLD AUTO: 35 % (ref 40–53)
HCT VFR BLD AUTO: 39.9 % (ref 40–53)
HCT VFR BLD CALC: 42 % (ref 40–53)
HGB BLD-MCNC: 10.8 G/DL (ref 13.3–17.7)
HGB BLD-MCNC: 10.8 G/DL (ref 13.3–17.7)
HGB BLD-MCNC: 12.4 G/DL (ref 13.3–17.7)
HGB BLD-MCNC: 12.4 G/DL (ref 13.3–17.7)
HGB BLD-MCNC: 14.2 G/DL (ref 13.3–17.7)
HGB BLD-MCNC: 14.3 G/DL (ref 13.3–17.7)
HGB FREE PLAS-MCNC: <30 MG/DL
HGB UR QL STRIP: ABNORMAL
IMM GRANULOCYTES # BLD: 0 10E3/UL
IMM GRANULOCYTES # BLD: 0.1 10E3/UL
IMM GRANULOCYTES NFR BLD: 0 %
IMM GRANULOCYTES NFR BLD: 1 %
INR PPP: 1.2 (ref 0.85–1.15)
INR PPP: 1.23 (ref 0.85–1.15)
INR PPP: 1.43 (ref 0.85–1.15)
INTERPRETATION ECG - MUSE: NORMAL
KETONES UR STRIP-MCNC: 20 MG/DL
LACTATE SERPL-SCNC: 0.3 MMOL/L (ref 0.7–2)
LACTATE SERPL-SCNC: 0.4 MMOL/L (ref 0.7–2)
LACTATE SERPL-SCNC: 2.9 MMOL/L (ref 0.7–2)
LDH SERPL L TO P-CCNC: 335 U/L (ref 0–250)
LEUKOCYTE ESTERASE UR QL STRIP: NEGATIVE
LIPASE SERPL-CCNC: 10 U/L (ref 13–60)
LVEF ECHO: NORMAL
LYMPHOCYTES # BLD AUTO: 0.7 10E3/UL (ref 0.8–5.3)
LYMPHOCYTES # BLD AUTO: 0.8 10E3/UL (ref 0.8–5.3)
LYMPHOCYTES NFR BLD AUTO: 14 %
LYMPHOCYTES NFR BLD AUTO: 5 %
MAGNESIUM SERPL-MCNC: 1.5 MG/DL (ref 1.7–2.3)
MAGNESIUM SERPL-MCNC: 1.6 MG/DL (ref 1.7–2.3)
MAGNESIUM SERPL-MCNC: 2.5 MG/DL (ref 1.7–2.3)
MCF P HPASE BLD TEG: 66.8 MM (ref 50–70)
MCH RBC QN AUTO: 29.7 PG (ref 26.5–33)
MCH RBC QN AUTO: 29.9 PG (ref 26.5–33)
MCH RBC QN AUTO: 30.2 PG (ref 26.5–33)
MCH RBC QN AUTO: 30.2 PG (ref 26.5–33)
MCH RBC QN AUTO: 30.6 PG (ref 26.5–33)
MCHC RBC AUTO-ENTMCNC: 33.8 G/DL (ref 31.5–36.5)
MCHC RBC AUTO-ENTMCNC: 35 G/DL (ref 31.5–36.5)
MCHC RBC AUTO-ENTMCNC: 35.4 G/DL (ref 31.5–36.5)
MCHC RBC AUTO-ENTMCNC: 35.4 G/DL (ref 31.5–36.5)
MCHC RBC AUTO-ENTMCNC: 35.6 G/DL (ref 31.5–36.5)
MCV RBC AUTO: 84 FL (ref 78–100)
MCV RBC AUTO: 85 FL (ref 78–100)
MCV RBC AUTO: 85 FL (ref 78–100)
MCV RBC AUTO: 88 FL (ref 78–100)
MCV RBC AUTO: 88 FL (ref 78–100)
MONOCYTES # BLD AUTO: 0.3 10E3/UL (ref 0–1.3)
MONOCYTES # BLD AUTO: 0.6 10E3/UL (ref 0–1.3)
MONOCYTES NFR BLD AUTO: 4 %
MONOCYTES NFR BLD AUTO: 6 %
MRSA DNA SPEC QL NAA+PROBE: NEGATIVE
MUCOUS THREADS #/AREA URNS LPF: PRESENT /LPF
NEUTROPHILS # BLD AUTO: 14.8 10E3/UL (ref 1.6–8.3)
NEUTROPHILS # BLD AUTO: 4 10E3/UL (ref 1.6–8.3)
NEUTROPHILS NFR BLD AUTO: 78 %
NEUTROPHILS NFR BLD AUTO: 90 %
NITRATE UR QL: NEGATIVE
NRBC # BLD AUTO: 0 10E3/UL
NRBC # BLD AUTO: 0 10E3/UL
NRBC BLD AUTO-RTO: 0 /100
NRBC BLD AUTO-RTO: 0 /100
O2/TOTAL GAS SETTING VFR VENT: 40 %
O2/TOTAL GAS SETTING VFR VENT: 50 %
O2/TOTAL GAS SETTING VFR VENT: 60 %
O2/TOTAL GAS SETTING VFR VENT: 90 %
OPIATES UR QL SCN: ABNORMAL
OXYHGB MFR BLDA: 89 % (ref 92–100)
OXYHGB MFR BLDA: 97 % (ref 92–100)
OXYHGB MFR BLDA: 98 % (ref 75–100)
OXYHGB MFR BLDA: 98 % (ref 92–100)
OXYHGB MFR BLDA: 98 % (ref 92–100)
OXYHGB MFR BLDA: 99 % (ref 75–100)
OXYHGB MFR BLDA: 99 % (ref 92–100)
OXYHGB MFR BLDA: 99 % (ref 92–100)
OXYHGB MFR BLDV: 76 %
OXYHGB MFR BLDV: 81 %
P AXIS - MUSE: 70 DEGREES
PCO2 BLD: 28 MM HG (ref 35–45)
PCO2 BLD: 30 MM HG (ref 35–45)
PCO2 BLD: 39 MM HG (ref 35–45)
PCO2 BLD: 40 MM HG (ref 35–45)
PCO2 BLD: 42 MM HG (ref 35–45)
PCO2 BLD: 42 MM HG (ref 35–45)
PCO2 BLDA: 29 MM HG (ref 35–45)
PCO2 BLDA: 52 MM HG (ref 35–45)
PCO2 BLDV: 37 MM HG (ref 40–50)
PCO2 BLDV: 43 MM HG (ref 40–50)
PCO2 BLDV: 52 MM HG (ref 40–50)
PCP QUAL URINE (ROCHE): ABNORMAL
PEEP: 8 CM H2O
PEEP: 8 CM H2O
PH BLD: 7.34 [PH] (ref 7.35–7.45)
PH BLD: 7.34 [PH] (ref 7.35–7.45)
PH BLD: 7.37 [PH] (ref 7.35–7.45)
PH BLD: 7.38 [PH] (ref 7.35–7.45)
PH BLD: 7.42 [PH] (ref 7.35–7.45)
PH BLD: 7.47 [PH] (ref 7.35–7.45)
PH BLDA: 7.28 [PH] (ref 7.35–7.45)
PH BLDA: 7.42 [PH] (ref 7.35–7.45)
PH BLDV: 7.28 [PH] (ref 7.32–7.43)
PH BLDV: 7.29 [PH] (ref 7.32–7.43)
PH BLDV: 7.37 [PH] (ref 7.32–7.43)
PH UR STRIP: 6 [PH] (ref 5–7)
PHOSPHATE SERPL-MCNC: 3.8 MG/DL (ref 2.5–4.5)
PLATELET # BLD AUTO: 124 10E3/UL (ref 150–450)
PLATELET # BLD AUTO: 129 10E3/UL (ref 150–450)
PLATELET # BLD AUTO: 156 10E3/UL (ref 150–450)
PLATELET # BLD AUTO: 207 10E3/UL (ref 150–450)
PLATELET # BLD AUTO: 207 10E3/UL (ref 150–450)
PO2 BLD: 107 MM HG (ref 80–105)
PO2 BLD: 127 MM HG (ref 80–105)
PO2 BLD: 181 MM HG (ref 80–105)
PO2 BLD: 204 MM HG (ref 80–105)
PO2 BLD: 317 MM HG (ref 80–105)
PO2 BLD: 56 MM HG (ref 80–105)
PO2 BLDA: 150 MM HG (ref 80–105)
PO2 BLDA: 286 MM HG (ref 80–105)
PO2 BLDV: 21 MM HG (ref 25–47)
PO2 BLDV: 43 MM HG (ref 25–47)
PO2 BLDV: 53 MM HG (ref 25–47)
POTASSIUM BLD-SCNC: 3 MMOL/L (ref 3.4–5.3)
POTASSIUM SERPL-SCNC: 2.9 MMOL/L (ref 3.4–5.3)
POTASSIUM SERPL-SCNC: 3.1 MMOL/L (ref 3.4–5.3)
POTASSIUM SERPL-SCNC: 3.1 MMOL/L (ref 3.4–5.3)
POTASSIUM SERPL-SCNC: 4 MMOL/L (ref 3.4–5.3)
POTASSIUM SERPL-SCNC: 4 MMOL/L (ref 3.4–5.3)
PR INTERVAL - MUSE: 172 MS
PROCALCITONIN SERPL IA-MCNC: 0.05 NG/ML
PROT SERPL-MCNC: 5.3 G/DL (ref 6.4–8.3)
PROT SERPL-MCNC: 5.4 G/DL (ref 6.4–8.3)
PROT SERPL-MCNC: 5.8 G/DL (ref 6.4–8.3)
PROT SERPL-MCNC: 6.8 G/DL (ref 6.4–8.3)
QRS DURATION - MUSE: 92 MS
QT - MUSE: 446 MS
QTC - MUSE: 474 MS
R AXIS - MUSE: 70 DEGREES
RBC # BLD AUTO: 3.53 10E6/UL (ref 4.4–5.9)
RBC # BLD AUTO: 3.64 10E6/UL (ref 4.4–5.9)
RBC # BLD AUTO: 4.1 10E6/UL (ref 4.4–5.9)
RBC # BLD AUTO: 4.1 10E6/UL (ref 4.4–5.9)
RBC # BLD AUTO: 4.75 10E6/UL (ref 4.4–5.9)
RBC URINE: 9 /HPF
SA TARGET DNA: NEGATIVE
SAO2 % BLDA: 100 % (ref 96–97)
SAO2 % BLDA: 90.9 % (ref 96–97)
SAO2 % BLDA: 99.5 % (ref 96–97)
SAO2 % BLDA: 99.8 % (ref 96–97)
SAO2 % BLDA: >100 % (ref 96–97)
SAO2 % BLDV: 28 % (ref 70–75)
SAO2 % BLDV: 77.6 % (ref 70–75)
SAO2 % BLDV: 82.2 % (ref 70–75)
SODIUM BLD-SCNC: 145 MMOL/L (ref 135–145)
SODIUM SERPL-SCNC: 140 MMOL/L (ref 135–145)
SODIUM SERPL-SCNC: 141 MMOL/L (ref 135–145)
SODIUM SERPL-SCNC: 142 MMOL/L (ref 135–145)
SODIUM SERPL-SCNC: 142 MMOL/L (ref 135–145)
SP GR UR STRIP: 1.01 (ref 1–1.03)
SPECIMEN EXP DATE BLD: NORMAL
SYSTOLIC BLOOD PRESSURE - MUSE: NORMAL MMHG
T AXIS - MUSE: -56 DEGREES
T3 SERPL-MCNC: 113 NG/DL (ref 85–202)
T3FREE SERPL-MCNC: 3.1 PG/ML (ref 2–4.4)
T4 FREE SERPL-MCNC: 1.5 NG/DL (ref 0.9–1.7)
TROPONIN T BLD-MCNC: 0.49 UG/L
TROPONIN T SERPL HS-MCNC: 2015 NG/L
TROPONIN T SERPL HS-MCNC: 33 NG/L
TROPONIN T SERPL HS-MCNC: 3910 NG/L
TROPONIN T SERPL HS-MCNC: 4891 NG/L
TROPONIN T SERPL HS-MCNC: 5285 NG/L
TROPONIN T SERPL HS-MCNC: 91 NG/L
TSH SERPL DL<=0.005 MIU/L-ACNC: 1.15 UIU/ML (ref 0.3–4.2)
UFH PPP CHRO-ACNC: 0.17 IU/ML
UFH PPP CHRO-ACNC: 0.29 IU/ML
UFH PPP CHRO-ACNC: >1.1 IU/ML
UROBILINOGEN UR STRIP-MCNC: NORMAL MG/DL
VENTRICULAR RATE- MUSE: 68 BPM
WBC # BLD AUTO: 16.4 10E3/UL (ref 4–11)
WBC # BLD AUTO: 16.4 10E3/UL (ref 4–11)
WBC # BLD AUTO: 5.2 10E3/UL (ref 4–11)
WBC # BLD AUTO: 7.8 10E3/UL (ref 4–11)
WBC # BLD AUTO: 8.8 10E3/UL (ref 4–11)
WBC URINE: 4 /HPF

## 2024-12-31 PROCEDURE — 82040 ASSAY OF SERUM ALBUMIN: CPT | Performed by: NURSE PRACTITIONER

## 2024-12-31 PROCEDURE — 84443 ASSAY THYROID STIM HORMONE: CPT | Performed by: NURSE PRACTITIONER

## 2024-12-31 PROCEDURE — 80354 DRUG SCREENING FENTANYL: CPT | Performed by: NURSE PRACTITIONER

## 2024-12-31 PROCEDURE — 85730 THROMBOPLASTIN TIME PARTIAL: CPT | Performed by: NURSE PRACTITIONER

## 2024-12-31 PROCEDURE — 31500 INSERT EMERGENCY AIRWAY: CPT | Performed by: EMERGENCY MEDICINE

## 2024-12-31 PROCEDURE — 272N000555 HC SENSOR NIRS OXIMETER, ADULT

## 2024-12-31 PROCEDURE — 80347 BENZODIAZEPINES 13 OR MORE: CPT | Performed by: NURSE PRACTITIONER

## 2024-12-31 PROCEDURE — 82805 BLOOD GASES W/O2 SATURATION: CPT | Performed by: NURSE PRACTITIONER

## 2024-12-31 PROCEDURE — 87641 MR-STAPH DNA AMP PROBE: CPT | Performed by: NURSE PRACTITIONER

## 2024-12-31 PROCEDURE — 250N000009 HC RX 250: Performed by: INTERNAL MEDICINE

## 2024-12-31 PROCEDURE — 93005 ELECTROCARDIOGRAM TRACING: CPT | Performed by: EMERGENCY MEDICINE

## 2024-12-31 PROCEDURE — 272N000237 HC CARDIOHELP CIRCUIT

## 2024-12-31 PROCEDURE — 82803 BLOOD GASES ANY COMBINATION: CPT

## 2024-12-31 PROCEDURE — 410N000003 HC PER-PERFUSION 1ST 30 MIN: Performed by: INTERNAL MEDICINE

## 2024-12-31 PROCEDURE — 999N000185 HC STATISTIC TRANSPORT TIME EA 15 MIN

## 2024-12-31 PROCEDURE — 82330 ASSAY OF CALCIUM: CPT | Performed by: NURSE PRACTITIONER

## 2024-12-31 PROCEDURE — 86316 IMMUNOASSAY TUMOR OTHER: CPT | Performed by: NURSE PRACTITIONER

## 2024-12-31 PROCEDURE — 84484 ASSAY OF TROPONIN QUANT: CPT | Performed by: NURSE PRACTITIONER

## 2024-12-31 PROCEDURE — 74176 CT ABD & PELVIS W/O CONTRAST: CPT | Mod: 26 | Performed by: RADIOLOGY

## 2024-12-31 PROCEDURE — 82805 BLOOD GASES W/O2 SATURATION: CPT | Performed by: INTERNAL MEDICINE

## 2024-12-31 PROCEDURE — 80349 CANNABINOIDS NATURAL: CPT | Performed by: NURSE PRACTITIONER

## 2024-12-31 PROCEDURE — 80171 DRUG SCREEN QUANT GABAPENTIN: CPT | Performed by: NURSE PRACTITIONER

## 2024-12-31 PROCEDURE — C1751 CATH, INF, PER/CENT/MIDLINE: HCPCS | Performed by: INTERNAL MEDICINE

## 2024-12-31 PROCEDURE — 85041 AUTOMATED RBC COUNT: CPT | Performed by: NURSE PRACTITIONER

## 2024-12-31 PROCEDURE — 71046 X-RAY EXAM CHEST 2 VIEWS: CPT | Mod: 26 | Performed by: RADIOLOGY

## 2024-12-31 PROCEDURE — 84484 ASSAY OF TROPONIN QUANT: CPT

## 2024-12-31 PROCEDURE — 71250 CT THORAX DX C-: CPT | Mod: MG

## 2024-12-31 PROCEDURE — 84155 ASSAY OF PROTEIN SERUM: CPT | Performed by: NURSE PRACTITIONER

## 2024-12-31 PROCEDURE — 93454 CORONARY ARTERY ANGIO S&I: CPT | Performed by: INTERNAL MEDICINE

## 2024-12-31 PROCEDURE — 71250 CT THORAX DX C-: CPT | Mod: 26 | Performed by: RADIOLOGY

## 2024-12-31 PROCEDURE — 84481 FREE ASSAY (FT-3): CPT | Performed by: NURSE PRACTITIONER

## 2024-12-31 PROCEDURE — 250N000011 HC RX IP 250 OP 636: Performed by: INTERNAL MEDICINE

## 2024-12-31 PROCEDURE — 85379 FIBRIN DEGRADATION QUANT: CPT | Performed by: NURSE PRACTITIONER

## 2024-12-31 PROCEDURE — 80307 DRUG TEST PRSMV CHEM ANLYZR: CPT | Performed by: NURSE PRACTITIONER

## 2024-12-31 PROCEDURE — 33952 ECMO/ECLS INSJ PRPH CANNULA: CPT | Performed by: INTERNAL MEDICINE

## 2024-12-31 PROCEDURE — 95720 EEG PHY/QHP EA INCR W/VEEG: CPT | Performed by: PSYCHIATRY & NEUROLOGY

## 2024-12-31 PROCEDURE — 99291 CRITICAL CARE FIRST HOUR: CPT | Mod: 25 | Performed by: EMERGENCY MEDICINE

## 2024-12-31 PROCEDURE — 93970 EXTREMITY STUDY: CPT

## 2024-12-31 PROCEDURE — 85652 RBC SED RATE AUTOMATED: CPT | Performed by: NURSE PRACTITIONER

## 2024-12-31 PROCEDURE — 84145 PROCALCITONIN (PCT): CPT | Performed by: NURSE PRACTITIONER

## 2024-12-31 PROCEDURE — 99291 CRITICAL CARE FIRST HOUR: CPT | Mod: 25 | Performed by: NURSE PRACTITIONER

## 2024-12-31 PROCEDURE — B2111ZZ FLUOROSCOPY OF MULTIPLE CORONARY ARTERIES USING LOW OSMOLAR CONTRAST: ICD-10-PCS | Performed by: INTERNAL MEDICINE

## 2024-12-31 PROCEDURE — 93925 LOWER EXTREMITY STUDY: CPT | Mod: 26 | Performed by: STUDENT IN AN ORGANIZED HEALTH CARE EDUCATION/TRAINING PROGRAM

## 2024-12-31 PROCEDURE — 250N000013 HC RX MED GY IP 250 OP 250 PS 637: Performed by: NURSE PRACTITIONER

## 2024-12-31 PROCEDURE — 85004 AUTOMATED DIFF WBC COUNT: CPT | Performed by: EMERGENCY MEDICINE

## 2024-12-31 PROCEDURE — 80365 DRUG SCREENING OXYCODONE: CPT | Performed by: NURSE PRACTITIONER

## 2024-12-31 PROCEDURE — 36415 COLL VENOUS BLD VENIPUNCTURE: CPT | Performed by: STUDENT IN AN ORGANIZED HEALTH CARE EDUCATION/TRAINING PROGRAM

## 2024-12-31 PROCEDURE — 71045 X-RAY EXAM CHEST 1 VIEW: CPT | Mod: 26 | Performed by: RADIOLOGY

## 2024-12-31 PROCEDURE — 93925 LOWER EXTREMITY STUDY: CPT

## 2024-12-31 PROCEDURE — 93010 ELECTROCARDIOGRAM REPORT: CPT | Mod: 59 | Performed by: EMERGENCY MEDICINE

## 2024-12-31 PROCEDURE — 95714 VEEG EA 12-26 HR UNMNTR: CPT

## 2024-12-31 PROCEDURE — 83690 ASSAY OF LIPASE: CPT | Performed by: EMERGENCY MEDICINE

## 2024-12-31 PROCEDURE — 3E033XZ INTRODUCTION OF VASOPRESSOR INTO PERIPHERAL VEIN, PERCUTANEOUS APPROACH: ICD-10-PCS | Performed by: EMERGENCY MEDICINE

## 2024-12-31 PROCEDURE — 83735 ASSAY OF MAGNESIUM: CPT | Performed by: NURSE PRACTITIONER

## 2024-12-31 PROCEDURE — 83036 HEMOGLOBIN GLYCOSYLATED A1C: CPT | Performed by: NURSE PRACTITIONER

## 2024-12-31 PROCEDURE — 70450 CT HEAD/BRAIN W/O DYE: CPT | Mod: MF

## 2024-12-31 PROCEDURE — 86850 RBC ANTIBODY SCREEN: CPT | Performed by: NURSE PRACTITIONER

## 2024-12-31 PROCEDURE — 999N000065 XR CHEST PORT 1 VIEW

## 2024-12-31 PROCEDURE — 71046 X-RAY EXAM CHEST 2 VIEWS: CPT

## 2024-12-31 PROCEDURE — 83992 ASSAY FOR PHENCYCLIDINE: CPT | Performed by: NURSE PRACTITIONER

## 2024-12-31 PROCEDURE — 250N000011 HC RX IP 250 OP 636

## 2024-12-31 PROCEDURE — 272N000001 HC OR GENERAL SUPPLY STERILE: Performed by: INTERNAL MEDICINE

## 2024-12-31 PROCEDURE — 82533 TOTAL CORTISOL: CPT | Performed by: NURSE PRACTITIONER

## 2024-12-31 PROCEDURE — C1769 GUIDE WIRE: HCPCS | Performed by: INTERNAL MEDICINE

## 2024-12-31 PROCEDURE — 84439 ASSAY OF FREE THYROXINE: CPT | Performed by: NURSE PRACTITIONER

## 2024-12-31 PROCEDURE — 85025 COMPLETE CBC W/AUTO DIFF WBC: CPT | Performed by: NURSE PRACTITIONER

## 2024-12-31 PROCEDURE — 93005 ELECTROCARDIOGRAM TRACING: CPT

## 2024-12-31 PROCEDURE — 85610 PROTHROMBIN TIME: CPT | Performed by: NURSE PRACTITIONER

## 2024-12-31 PROCEDURE — 84484 ASSAY OF TROPONIN QUANT: CPT | Performed by: EMERGENCY MEDICINE

## 2024-12-31 PROCEDURE — 74018 RADEX ABDOMEN 1 VIEW: CPT | Mod: 26 | Performed by: RADIOLOGY

## 2024-12-31 PROCEDURE — 3E043XZ INTRODUCTION OF VASOPRESSOR INTO CENTRAL VEIN, PERCUTANEOUS APPROACH: ICD-10-PCS | Performed by: INTERNAL MEDICINE

## 2024-12-31 PROCEDURE — 93306 TTE W/DOPPLER COMPLETE: CPT | Mod: 26 | Performed by: STUDENT IN AN ORGANIZED HEALTH CARE EDUCATION/TRAINING PROGRAM

## 2024-12-31 PROCEDURE — 85347 COAGULATION TIME ACTIVATED: CPT

## 2024-12-31 PROCEDURE — 250N000011 HC RX IP 250 OP 636: Performed by: NURSE PRACTITIONER

## 2024-12-31 PROCEDURE — 84480 ASSAY TRIIODOTHYRONINE (T3): CPT | Performed by: NURSE PRACTITIONER

## 2024-12-31 PROCEDURE — 82947 ASSAY GLUCOSE BLOOD QUANT: CPT | Performed by: NURSE PRACTITIONER

## 2024-12-31 PROCEDURE — 83615 LACTATE (LD) (LDH) ENZYME: CPT | Performed by: NURSE PRACTITIONER

## 2024-12-31 PROCEDURE — 5A1955Z RESPIRATORY VENTILATION, GREATER THAN 96 CONSECUTIVE HOURS: ICD-10-PCS | Performed by: EMERGENCY MEDICINE

## 2024-12-31 PROCEDURE — 33947 ECMO/ECLS INITIATION ARTERY: CPT | Performed by: STUDENT IN AN ORGANIZED HEALTH CARE EDUCATION/TRAINING PROGRAM

## 2024-12-31 PROCEDURE — 93306 TTE W/DOPPLER COMPLETE: CPT

## 2024-12-31 PROCEDURE — 99153 MOD SED SAME PHYS/QHP EA: CPT | Performed by: INTERNAL MEDICINE

## 2024-12-31 PROCEDURE — 93970 EXTREMITY STUDY: CPT | Mod: 26 | Performed by: STUDENT IN AN ORGANIZED HEALTH CARE EDUCATION/TRAINING PROGRAM

## 2024-12-31 PROCEDURE — G1010 CDSM STANSON: HCPCS | Performed by: RADIOLOGY

## 2024-12-31 PROCEDURE — 70450 CT HEAD/BRAIN W/O DYE: CPT | Mod: 26 | Performed by: RADIOLOGY

## 2024-12-31 PROCEDURE — 33947 ECMO/ECLS INITIATION ARTERY: CPT

## 2024-12-31 PROCEDURE — 85014 HEMATOCRIT: CPT | Performed by: NURSE PRACTITIONER

## 2024-12-31 PROCEDURE — 85384 FIBRINOGEN ACTIVITY: CPT | Performed by: STUDENT IN AN ORGANIZED HEALTH CARE EDUCATION/TRAINING PROGRAM

## 2024-12-31 PROCEDURE — 410N000004: Performed by: INTERNAL MEDICINE

## 2024-12-31 PROCEDURE — 99222 1ST HOSP IP/OBS MODERATE 55: CPT | Performed by: THORACIC SURGERY (CARDIOTHORACIC VASCULAR SURGERY)

## 2024-12-31 PROCEDURE — 999N000065 XR ABDOMEN PORT 1 VIEW

## 2024-12-31 PROCEDURE — 82040 ASSAY OF SERUM ALBUMIN: CPT | Performed by: EMERGENCY MEDICINE

## 2024-12-31 PROCEDURE — 250N000011 HC RX IP 250 OP 636: Performed by: EMERGENCY MEDICINE

## 2024-12-31 PROCEDURE — G1010 CDSM STANSON: HCPCS

## 2024-12-31 PROCEDURE — 85396 CLOTTING ASSAY WHOLE BLOOD: CPT | Performed by: NURSE PRACTITIONER

## 2024-12-31 PROCEDURE — 200N000002 HC R&B ICU UMMC

## 2024-12-31 PROCEDURE — 94002 VENT MGMT INPAT INIT DAY: CPT

## 2024-12-31 PROCEDURE — 250N000011 HC RX IP 250 OP 636: Performed by: STUDENT IN AN ORGANIZED HEALTH CARE EDUCATION/TRAINING PROGRAM

## 2024-12-31 PROCEDURE — 86140 C-REACTIVE PROTEIN: CPT | Performed by: NURSE PRACTITIONER

## 2024-12-31 PROCEDURE — 87040 BLOOD CULTURE FOR BACTERIA: CPT | Performed by: STUDENT IN AN ORGANIZED HEALTH CARE EDUCATION/TRAINING PROGRAM

## 2024-12-31 PROCEDURE — C1894 INTRO/SHEATH, NON-LASER: HCPCS | Performed by: INTERNAL MEDICINE

## 2024-12-31 PROCEDURE — 250N000009 HC RX 250

## 2024-12-31 PROCEDURE — 250N000009 HC RX 250: Performed by: EMERGENCY MEDICINE

## 2024-12-31 PROCEDURE — 36556 INSERT NON-TUNNEL CV CATH: CPT | Performed by: INTERNAL MEDICINE

## 2024-12-31 PROCEDURE — 93880 EXTRACRANIAL BILAT STUDY: CPT | Mod: 26 | Performed by: STUDENT IN AN ORGANIZED HEALTH CARE EDUCATION/TRAINING PROGRAM

## 2024-12-31 PROCEDURE — 96374 THER/PROPH/DIAG INJ IV PUSH: CPT | Performed by: EMERGENCY MEDICINE

## 2024-12-31 PROCEDURE — 999N000157 HC STATISTIC RCP TIME EA 10 MIN

## 2024-12-31 PROCEDURE — 250N000009 HC RX 250: Performed by: NURSE PRACTITIONER

## 2024-12-31 PROCEDURE — 81001 URINALYSIS AUTO W/SCOPE: CPT | Performed by: NURSE PRACTITIONER

## 2024-12-31 PROCEDURE — 82805 BLOOD GASES W/O2 SATURATION: CPT | Performed by: STUDENT IN AN ORGANIZED HEALTH CARE EDUCATION/TRAINING PROGRAM

## 2024-12-31 PROCEDURE — 83605 ASSAY OF LACTIC ACID: CPT | Performed by: NURSE PRACTITIONER

## 2024-12-31 PROCEDURE — 258N000003 HC RX IP 258 OP 636: Performed by: NURSE PRACTITIONER

## 2024-12-31 PROCEDURE — 84100 ASSAY OF PHOSPHORUS: CPT | Performed by: STUDENT IN AN ORGANIZED HEALTH CARE EDUCATION/TRAINING PROGRAM

## 2024-12-31 PROCEDURE — 85520 HEPARIN ASSAY: CPT | Performed by: NURSE PRACTITIONER

## 2024-12-31 PROCEDURE — 87070 CULTURE OTHR SPECIMN AEROBIC: CPT | Performed by: NURSE PRACTITIONER

## 2024-12-31 PROCEDURE — 5A1522G EXTRACORPOREAL OXYGENATION, MEMBRANE, PERIPHERAL VENO-ARTERIAL: ICD-10-PCS | Performed by: INTERNAL MEDICINE

## 2024-12-31 PROCEDURE — 83051 HEMOGLOBIN PLASMA: CPT | Performed by: NURSE PRACTITIONER

## 2024-12-31 PROCEDURE — 87640 STAPH A DNA AMP PROBE: CPT | Performed by: NURSE PRACTITIONER

## 2024-12-31 PROCEDURE — 250N000013 HC RX MED GY IP 250 OP 250 PS 637: Performed by: EMERGENCY MEDICINE

## 2024-12-31 PROCEDURE — 86900 BLOOD TYPING SEROLOGIC ABO: CPT | Performed by: NURSE PRACTITIONER

## 2024-12-31 PROCEDURE — 99152 MOD SED SAME PHYS/QHP 5/>YRS: CPT | Performed by: INTERNAL MEDICINE

## 2024-12-31 PROCEDURE — 36415 COLL VENOUS BLD VENIPUNCTURE: CPT | Performed by: EMERGENCY MEDICINE

## 2024-12-31 PROCEDURE — 93880 EXTRACRANIAL BILAT STUDY: CPT

## 2024-12-31 RX ORDER — AMOXICILLIN 250 MG
2 CAPSULE ORAL 2 TIMES DAILY
Status: DISCONTINUED | OUTPATIENT
Start: 2025-01-02 | End: 2025-01-02

## 2024-12-31 RX ORDER — POTASSIUM CHLORIDE 7.45 MG/ML
10 INJECTION INTRAVENOUS
Status: DISCONTINUED | OUTPATIENT
Start: 2024-12-31 | End: 2024-12-31

## 2024-12-31 RX ORDER — NALOXONE HYDROCHLORIDE 0.4 MG/ML
0.2 INJECTION, SOLUTION INTRAMUSCULAR; INTRAVENOUS; SUBCUTANEOUS
Status: DISCONTINUED | OUTPATIENT
Start: 2024-12-31 | End: 2025-01-02

## 2024-12-31 RX ORDER — NALOXONE HYDROCHLORIDE 0.4 MG/ML
0.4 INJECTION, SOLUTION INTRAMUSCULAR; INTRAVENOUS; SUBCUTANEOUS
Status: DISCONTINUED | OUTPATIENT
Start: 2024-12-31 | End: 2025-01-02

## 2024-12-31 RX ORDER — LIDOCAINE 40 MG/G
CREAM TOPICAL
Status: CANCELLED | OUTPATIENT
Start: 2024-12-31

## 2024-12-31 RX ORDER — ASPIRIN 81 MG/1
81 TABLET, CHEWABLE ORAL DAILY
Status: CANCELLED | OUTPATIENT
Start: 2024-12-31

## 2024-12-31 RX ORDER — HEPARIN SODIUM 200 [USP'U]/100ML
3 INJECTION, SOLUTION INTRAVENOUS CONTINUOUS
Status: DISCONTINUED | OUTPATIENT
Start: 2024-12-31 | End: 2025-01-02

## 2024-12-31 RX ORDER — PHENYLEPHRINE HCL IN 0.9% NACL 50MG/250ML
.1-6 PLASTIC BAG, INJECTION (ML) INTRAVENOUS CONTINUOUS
Status: CANCELLED | OUTPATIENT
Start: 2024-12-31

## 2024-12-31 RX ORDER — DEXMEDETOMIDINE HYDROCHLORIDE 4 UG/ML
.1-1.2 INJECTION, SOLUTION INTRAVENOUS CONTINUOUS
Status: CANCELLED | OUTPATIENT
Start: 2024-12-31

## 2024-12-31 RX ORDER — PROPOFOL 10 MG/ML
INJECTION, EMULSION INTRAVENOUS CONTINUOUS PRN
Status: COMPLETED | OUTPATIENT
Start: 2024-12-31 | End: 2024-12-31

## 2024-12-31 RX ORDER — CHLORHEXIDINE GLUCONATE ORAL RINSE 1.2 MG/ML
15 SOLUTION DENTAL EVERY 12 HOURS
Status: DISCONTINUED | OUTPATIENT
Start: 2024-12-31 | End: 2025-01-02

## 2024-12-31 RX ORDER — NOREPINEPHRINE BITARTRATE 0.06 MG/ML
INJECTION, SOLUTION INTRAVENOUS CONTINUOUS PRN
Status: COMPLETED | OUTPATIENT
Start: 2024-12-31 | End: 2024-12-31

## 2024-12-31 RX ORDER — ASPIRIN 81 MG/1
81 TABLET, CHEWABLE ORAL ONCE
Status: COMPLETED | OUTPATIENT
Start: 2024-12-31 | End: 2024-12-31

## 2024-12-31 RX ORDER — OXYCODONE HYDROCHLORIDE 5 MG/1
5 TABLET ORAL ONCE
Status: COMPLETED | OUTPATIENT
Start: 2024-12-31 | End: 2024-12-31

## 2024-12-31 RX ORDER — METHOCARBAMOL 500 MG/1
500 TABLET, FILM COATED ORAL 4 TIMES DAILY
Status: DISCONTINUED | OUTPATIENT
Start: 2024-12-31 | End: 2025-01-02

## 2024-12-31 RX ORDER — ETOMIDATE 2 MG/ML
20 INJECTION INTRAVENOUS ONCE
Status: COMPLETED | OUTPATIENT
Start: 2024-12-31 | End: 2024-12-31

## 2024-12-31 RX ORDER — EPINEPHRINE IN 0.9 % SOD CHLOR 5 MG/250ML
.03-.3 PLASTIC BAG, INJECTION (ML) INTRAVENOUS CONTINUOUS
Status: DISCONTINUED | OUTPATIENT
Start: 2024-12-31 | End: 2025-01-02

## 2024-12-31 RX ORDER — ASPIRIN 81 MG/1
162 TABLET, CHEWABLE ORAL
Status: CANCELLED | OUTPATIENT
Start: 2024-12-31

## 2024-12-31 RX ORDER — POTASSIUM CHLORIDE 29.8 MG/ML
20 INJECTION INTRAVENOUS
Status: COMPLETED | OUTPATIENT
Start: 2024-12-31 | End: 2024-12-31

## 2024-12-31 RX ORDER — PROPOFOL 10 MG/ML
5-75 INJECTION, EMULSION INTRAVENOUS CONTINUOUS
Status: DISCONTINUED | OUTPATIENT
Start: 2024-12-31 | End: 2025-01-02

## 2024-12-31 RX ORDER — ASPIRIN 81 MG/1
81 TABLET, CHEWABLE ORAL
Status: CANCELLED | OUTPATIENT
Start: 2024-12-31

## 2024-12-31 RX ORDER — CEFAZOLIN SODIUM 2 G/100ML
2 INJECTION, SOLUTION INTRAVENOUS SEE ADMIN INSTRUCTIONS
Status: CANCELLED | OUTPATIENT
Start: 2024-12-31

## 2024-12-31 RX ORDER — POLYETHYLENE GLYCOL 3350 17 G/17G
17 POWDER, FOR SOLUTION ORAL 2 TIMES DAILY
Status: DISCONTINUED | OUTPATIENT
Start: 2024-12-31 | End: 2025-01-02

## 2024-12-31 RX ORDER — PROPOFOL 10 MG/ML
5-75 INJECTION, EMULSION INTRAVENOUS CONTINUOUS
Status: DISCONTINUED | OUTPATIENT
Start: 2024-12-31 | End: 2024-12-31

## 2024-12-31 RX ORDER — EPINEPHRINE IN 0.9 % SOD CHLOR 5 MG/250ML
.01-.3 PLASTIC BAG, INJECTION (ML) INTRAVENOUS CONTINUOUS
Status: CANCELLED | OUTPATIENT
Start: 2024-12-31

## 2024-12-31 RX ORDER — ONDANSETRON 2 MG/ML
4 INJECTION INTRAMUSCULAR; INTRAVENOUS ONCE
Status: COMPLETED | OUTPATIENT
Start: 2024-12-31 | End: 2024-12-31

## 2024-12-31 RX ORDER — LIDOCAINE 40 MG/G
CREAM TOPICAL
Status: DISCONTINUED | OUTPATIENT
Start: 2024-12-31 | End: 2025-01-02

## 2024-12-31 RX ORDER — BACLOFEN 20 MG/1
20 TABLET ORAL 3 TIMES DAILY
Status: DISCONTINUED | OUTPATIENT
Start: 2024-12-31 | End: 2025-01-02

## 2024-12-31 RX ORDER — FENTANYL CITRATE 50 UG/ML
INJECTION, SOLUTION INTRAMUSCULAR; INTRAVENOUS
Status: DISCONTINUED | OUTPATIENT
Start: 2024-12-31 | End: 2024-12-31 | Stop reason: HOSPADM

## 2024-12-31 RX ORDER — MIDAZOLAM HCL IN 0.9 % NACL/PF 1 MG/ML
1-8 PLASTIC BAG, INJECTION (ML) INTRAVENOUS CONTINUOUS
Status: DISCONTINUED | OUTPATIENT
Start: 2024-12-31 | End: 2025-01-02

## 2024-12-31 RX ORDER — HEPARIN SODIUM 1000 [USP'U]/ML
INJECTION, SOLUTION INTRAVENOUS; SUBCUTANEOUS
Status: DISCONTINUED | OUTPATIENT
Start: 2024-12-31 | End: 2024-12-31 | Stop reason: HOSPADM

## 2024-12-31 RX ORDER — MAGNESIUM SULFATE HEPTAHYDRATE 40 MG/ML
4 INJECTION, SOLUTION INTRAVENOUS ONCE
Status: COMPLETED | OUTPATIENT
Start: 2024-12-31 | End: 2024-12-31

## 2024-12-31 RX ORDER — VANCOMYCIN HYDROCHLORIDE
1500 EVERY 24 HOURS
Status: DISCONTINUED | OUTPATIENT
Start: 2024-12-31 | End: 2025-01-01

## 2024-12-31 RX ORDER — HEPARIN SOD,PORCINE/0.9 % NACL 30K/1000ML
INTRAVENOUS SOLUTION INTRAVENOUS
Status: CANCELLED | OUTPATIENT
Start: 2024-12-31

## 2024-12-31 RX ORDER — HEPARIN SODIUM 10000 [USP'U]/100ML
0-5000 INJECTION, SOLUTION INTRAVENOUS CONTINUOUS
Status: DISCONTINUED | OUTPATIENT
Start: 2024-12-31 | End: 2024-12-31

## 2024-12-31 RX ORDER — ASPIRIN 81 MG/1
324 TABLET, CHEWABLE ORAL ONCE
Status: COMPLETED | OUTPATIENT
Start: 2024-12-31 | End: 2024-12-31

## 2024-12-31 RX ORDER — IOPAMIDOL 755 MG/ML
INJECTION, SOLUTION INTRAVASCULAR
Status: DISCONTINUED | OUTPATIENT
Start: 2024-12-31 | End: 2024-12-31 | Stop reason: HOSPADM

## 2024-12-31 RX ORDER — GABAPENTIN 100 MG/1
100 CAPSULE ORAL
Status: CANCELLED | OUTPATIENT
Start: 2024-12-31

## 2024-12-31 RX ORDER — NOREPINEPHRINE BITARTRATE 0.06 MG/ML
.01-.1 INJECTION, SOLUTION INTRAVENOUS CONTINUOUS
Status: CANCELLED | OUTPATIENT
Start: 2024-12-31

## 2024-12-31 RX ORDER — CHLORHEXIDINE GLUCONATE ORAL RINSE 1.2 MG/ML
10 SOLUTION DENTAL ONCE
Status: CANCELLED | OUTPATIENT
Start: 2024-12-31 | End: 2024-12-31

## 2024-12-31 RX ORDER — DEXTROSE MONOHYDRATE 25 G/50ML
25-50 INJECTION, SOLUTION INTRAVENOUS
Status: DISCONTINUED | OUTPATIENT
Start: 2024-12-31 | End: 2025-01-02

## 2024-12-31 RX ORDER — CEFTRIAXONE 2 G/1
2 INJECTION, POWDER, FOR SOLUTION INTRAMUSCULAR; INTRAVENOUS EVERY 24 HOURS
Status: DISCONTINUED | OUTPATIENT
Start: 2024-12-31 | End: 2025-01-02

## 2024-12-31 RX ORDER — HEPARIN SODIUM 10000 [USP'U]/100ML
10-50 INJECTION, SOLUTION INTRAVENOUS CONTINUOUS
Status: DISCONTINUED | OUTPATIENT
Start: 2024-12-31 | End: 2025-01-02

## 2024-12-31 RX ORDER — NOREPINEPHRINE BITARTRATE 0.06 MG/ML
.01-.6 INJECTION, SOLUTION INTRAVENOUS CONTINUOUS
Status: DISCONTINUED | OUTPATIENT
Start: 2024-12-31 | End: 2025-01-02

## 2024-12-31 RX ORDER — MIDAZOLAM HCL IN 0.9 % NACL/PF 1 MG/ML
PLASTIC BAG, INJECTION (ML) INTRAVENOUS CONTINUOUS PRN
Status: COMPLETED | OUTPATIENT
Start: 2024-12-31 | End: 2024-12-31

## 2024-12-31 RX ORDER — CEFAZOLIN SODIUM 2 G/100ML
2 INJECTION, SOLUTION INTRAVENOUS
Status: CANCELLED | OUTPATIENT
Start: 2024-12-31

## 2024-12-31 RX ORDER — FAMOTIDINE 20 MG/1
20 TABLET, FILM COATED ORAL
Status: CANCELLED | OUTPATIENT
Start: 2024-12-31

## 2024-12-31 RX ORDER — NICOTINE POLACRILEX 4 MG
15-30 LOZENGE BUCCAL
Status: DISCONTINUED | OUTPATIENT
Start: 2024-12-31 | End: 2025-01-02

## 2024-12-31 RX ADMIN — PANTOPRAZOLE SODIUM 40 MG: 40 INJECTION, POWDER, FOR SOLUTION INTRAVENOUS at 11:52

## 2024-12-31 RX ADMIN — POTASSIUM CHLORIDE 20 MEQ: 29.8 INJECTION, SOLUTION INTRAVENOUS at 19:58

## 2024-12-31 RX ADMIN — Medication 1500 MG: at 12:48

## 2024-12-31 RX ADMIN — SODIUM CHLORIDE 1000 ML: 9 INJECTION, SOLUTION INTRAVENOUS at 15:55

## 2024-12-31 RX ADMIN — PROPOFOL 60 MCG/KG/MIN: 10 INJECTION, EMULSION INTRAVENOUS at 12:32

## 2024-12-31 RX ADMIN — OXYCODONE HYDROCHLORIDE 5 MG: 5 TABLET ORAL at 07:57

## 2024-12-31 RX ADMIN — HEPARIN SODIUM 3 ML/HR: 200 INJECTION, SOLUTION INTRAVENOUS at 11:51

## 2024-12-31 RX ADMIN — AMIODARONE HYDROCHLORIDE 1 MG/MIN: 50 INJECTION, SOLUTION INTRAVENOUS at 12:39

## 2024-12-31 RX ADMIN — ASPIRIN 81 MG CHEWABLE TABLET 81 MG: 81 TABLET CHEWABLE at 16:05

## 2024-12-31 RX ADMIN — WHITE PETROLATUM 57.7 %-MINERAL OIL 31.9 % EYE OINTMENT: at 17:50

## 2024-12-31 RX ADMIN — METHOCARBAMOL TABLETS 500 MG: 500 TABLET, COATED ORAL at 21:04

## 2024-12-31 RX ADMIN — CHLORHEXIDINE GLUCONATE 15 ML: 1.2 SOLUTION ORAL at 21:04

## 2024-12-31 RX ADMIN — ETOMIDATE 20 MG: 2 INJECTION, SOLUTION INTRAVENOUS at 08:58

## 2024-12-31 RX ADMIN — CALCIUM CHLORIDE 1 G: 100 INJECTION, SOLUTION INTRAVENOUS at 11:52

## 2024-12-31 RX ADMIN — MIDAZOLAM 2 MG: 1 INJECTION INTRAMUSCULAR; INTRAVENOUS at 09:11

## 2024-12-31 RX ADMIN — AMIODARONE HYDROCHLORIDE 150 MG: 1.5 INJECTION, SOLUTION INTRAVENOUS at 12:39

## 2024-12-31 RX ADMIN — PROPOFOL 75 MCG/KG/MIN: 10 INJECTION, EMULSION INTRAVENOUS at 20:17

## 2024-12-31 RX ADMIN — CHLORHEXIDINE GLUCONATE 15 ML: 1.2 SOLUTION ORAL at 13:03

## 2024-12-31 RX ADMIN — CEFTRIAXONE SODIUM 2 G: 2 INJECTION, POWDER, FOR SOLUTION INTRAMUSCULAR; INTRAVENOUS at 12:58

## 2024-12-31 RX ADMIN — INSULIN HUMAN 2 UNITS/HR: 1 INJECTION, SOLUTION INTRAVENOUS at 13:04

## 2024-12-31 RX ADMIN — BACLOFEN 20 MG: 20 TABLET ORAL at 21:04

## 2024-12-31 RX ADMIN — Medication 50 MCG/HR: at 12:02

## 2024-12-31 RX ADMIN — PROPOFOL 75 MCG/KG/MIN: 10 INJECTION, EMULSION INTRAVENOUS at 17:38

## 2024-12-31 RX ADMIN — ONDANSETRON 4 MG: 2 INJECTION INTRAMUSCULAR; INTRAVENOUS at 07:54

## 2024-12-31 RX ADMIN — MAGNESIUM SULFATE IN WATER 4 G: 40 INJECTION, SOLUTION INTRAVENOUS at 19:04

## 2024-12-31 RX ADMIN — POLYETHYLENE GLYCOL 3350 17 G: 17 POWDER, FOR SOLUTION ORAL at 21:04

## 2024-12-31 RX ADMIN — HEPARIN SODIUM 750 UNITS/HR: 10000 INJECTION, SOLUTION INTRAVENOUS at 13:24

## 2024-12-31 RX ADMIN — POTASSIUM CHLORIDE 20 MEQ: 29.8 INJECTION, SOLUTION INTRAVENOUS at 19:03

## 2024-12-31 RX ADMIN — MIDAZOLAM IN SODIUM CHLORIDE 3 MG/HR: 1 INJECTION INTRAVENOUS at 20:43

## 2024-12-31 RX ADMIN — PROPOFOL 70 MCG/KG/MIN: 10 INJECTION, EMULSION INTRAVENOUS at 14:55

## 2024-12-31 RX ADMIN — SUCCINYLCHOLINE CHLORIDE 150 MG: 20 INJECTION, SOLUTION INTRAMUSCULAR; INTRAVENOUS; PARENTERAL at 08:58

## 2024-12-31 RX ADMIN — POTASSIUM CHLORIDE 20 MEQ: 29.8 INJECTION, SOLUTION INTRAVENOUS at 21:36

## 2024-12-31 RX ADMIN — WHITE PETROLATUM 57.7 %-MINERAL OIL 31.9 % EYE OINTMENT: at 12:04

## 2024-12-31 RX ADMIN — METHOCARBAMOL TABLETS 500 MG: 500 TABLET, COATED ORAL at 16:10

## 2024-12-31 ASSESSMENT — ACTIVITIES OF DAILY LIVING (ADL)
ADLS_ACUITY_SCORE: 67
ADLS_ACUITY_SCORE: 67
ADLS_ACUITY_SCORE: 53
ADLS_ACUITY_SCORE: 65
ADLS_ACUITY_SCORE: 67
ADLS_ACUITY_SCORE: 67
ADLS_ACUITY_SCORE: 53
ADLS_ACUITY_SCORE: 67
ADLS_ACUITY_SCORE: 67
ADLS_ACUITY_SCORE: 53
ADLS_ACUITY_SCORE: 67
ADLS_ACUITY_SCORE: 53
ADLS_ACUITY_SCORE: 53

## 2024-12-31 NOTE — PROGRESS NOTES
[facility]    ECLS Shift Summary:     ECMO Equipment:  Console Serial Number: [id number]  Circuit Lot Number: [id number]  Oxygenator Lot Number: [id number]    Circuit Assessment: Free of fibrin, clot, and air    Arterial ECMO Cannula: 17 Fr in the Right Femoral Artery  Venous ECMO Cannula: 25 Fr in the Right Femoral Vein  Distal Perfusion Catheter: 8 Fr in the Right Superficial Femoral Artery    ECMO Cannula Venous Right femoral vein-Site Assessment: WDL  ECMO Cannula Arterial Right femoral artery-Site Assessment: WDL  Distal Perfusion Catheter Right superficial femoral artery-Site Assessment: WDL  ECMO Cannula Venous Right femoral vein-Site Intervention: No intervention needed  ECMO Cannula Arterial Right femoral artery-Site Intervention: No intervention needed  Distal Perfusion Catheter Right superficial femoral artery-Site Intervention: No intervention needed    Patient remains on V-A ECMO, all equipment is functioning and alarms are appropriately set. RPM's: 4030 with Blood Flow (Circuit) LPM  Av LPM  Min: 3.74 LPM  Max: 4.4 LPM L/min. Sweep is at (S) 0.5 LPM and 70 %. Extremities are warm and well perfused, right BKA.     Significant Shift Events: Stabilized on ECMO, optimizing flows and gases, Heparin started, CV surgery consulted for CABG.     Vent settings:  FiO2 (%): 90 %, Resp: 13, Vent Mode: CMV/AC, Resp Rate (Set): 18 breaths/min, Tidal Volume (Set, mL): 430 mL, PEEP (cm H2O): (S) 8 cmH2O, Resp Rate (Set): 18 breaths/min, Tidal Volume (Set, mL): 430 mL, PEEP (cm H2O): (S) 8 cmH2O    Anticoagulation:  Dose (units/hr) HEParin: 750 Units/hr  Rate (mL/hr) HEParin: 7.5 mL/hr  Concentration HEParin: 100 Units/mL        Most recent: ACT  (seconds): 235 seconds    Urine output is as charted per RN, clear cory w/o foul odor'.  Blood loss was minimal oozing around cannula site.  Product given included 1 L LR     Intake/Output Summary (Last 24 hours) at 2024  1418  Last data filed at 12/31/2024 1300  Gross per 24 hour   Intake 597.83 ml   Output 590 ml   Net 7.83 ml       Labs:  Recent Labs   Lab 12/31/24  1328 12/31/24  1110 12/31/24  1109   PH 7.47*  --  7.42   PCO2 28*  --  30*   PO2 317*  --  56*   HCO3 21  --  19*   O2PER 90 40  40 40       Lab Results   Component Value Date    HGB 12.4 (L) 12/31/2024    HGB 12.4 (L) 12/31/2024    PHGB <30 12/31/2024     12/31/2024     12/31/2024    INR 1.43 (H) 12/31/2024    PTT >240 (HH) 12/31/2024    DD 7.43 (H) 12/31/2024       Plan is continue VA ECMO support, turndown with ECHO tomorrow, CABG workup.     Sharonda Bragg RN  ECMO Specialist  12/31/2024 2:18 PM

## 2024-12-31 NOTE — H&P
Cardiology ICU H&P Note    HPI:  Erwin Aguilar is a 64 year old male with a past medical history of DM2, HTN, PVD, and neuropathic pain, who presented to the emergency department on the [place] here at the  for c/o arm pain, vomiting, and diarrhea with c/f ACS. Patient was evaluated by ED staff and 12 lead demonstrated some ST depression in the lateral leads which was communicated to interventional staff, after decision was made to go to the cath lab patient had a VT cardiac arrest which resolved with 1 round of CPR, epinephrine, defibrillation. Patient's was intubated for airway protection and his rhythm after ROSC was afib with RVR. He had an adequate BP, however after rapid transport to the cath lab patient became hypotensive and was subsequently cannulated for VA ECMO.     Subjective and Interval:  - as above    Assessment and plan by system:  ==Today's changes ==  - admit to CVICU under CICU service  - resuscitate and support on VA ECMO  - CVTS consult for consideration of CABG  - flow high on circuit  - sedate as indicated     == Neurology ==   #encephalopathy  -Intubated, sedated. Avoiding hyperthermia  -vEEG     CT head shows pending    Current sedation: fentanyl, versed, propofol infusion     Plan:  -Continue sedation with a RASS goal -2 to 3  -Spontaneous awakening trial as tolerated  -Permissive hypernatremia for neuroprotection    == Cardiovascular ==  #Refractory VF Cardiac arrest   #Shock requiring VA ECMO  #CAD  #Cardiomyopathy  #Dyslipidemia  Peripheral V-A ECMO inserted via  DRPC, RCFA, and RCFV   Angiogram:   Severe obstructive multivessel coronary artery disease  Severe obstructive distal left main at a complex calcified bifurcation lesion of the ostial LAD, a large ramus branch, as well as the ostial left circumflex  Severe obstructive disease of the ostial RCA as well as the distal RCA  Cardiothoracic surgery consult placed for bypass evaluation to be done as inpatient  Successful  insertion of VA ECMO as well as distal reperfusion cannula  Successful insertion of right arterial radial line  Successful insertion of left femoral venous triple-lumen central line  Successful insertion of left femoral arterial arterial line    TTE: pending       ECMO :  Mode: V-A   Pump Type: Cardiohelp  RPM's: 3800  Blood Flow (Circuit) LPM: 3.74 LPM  Sweep LPM: (S) 2 LPM  Sweep FiO2   %: 70 %  Venous Pressure  mmHg: -53 mmHg  Arterial Pressure  mmH mmHg  Internal Pressure mmH mmHg  Pressure Change mmH mmHg    Current Pressors/inotropes/antiarrythmic:    Hemodynamics:       Invasive Hemodynamic Monitoring:                            Plan:  -Continue ASA 81mg  -Hold lipitor for now given shock liver  -Hold ACE/ARB for now given likely reduced renal fxn after arrest  -Hold beta blocker given shock  -Telemetry monitoring  -Trend troponin   -Continue ECMO support  -amiodarone infusion   -FBG even for now, Diuresis hold for now    == Pulmonary ==  #Acute hypoxemic respiratory failure requiring intubation  #Probable aspiration pneumonia    Vent Settings:   FiO2 (%): 40 %, Resp: 0 (ECt02 48), Vent Mode: CMV/AC, Resp Rate (Set): 18 breaths/min, Tidal Volume (Set, mL): 430 mL, PEEP (cm H2O): 5 cmH2O, Resp Rate (Set): 18 breaths/min, Tidal Volume (Set, mL): 430 mL, PEEP (cm H2O): 5 cmH2O    Plan:  -Wean vent as able  -Daily CXR  -Serial ABGs  -Peridex BID  -FBG and diuresis as above        == Gastrointestinal, Nutrition ==  #Concern for Shock liver 2/2 cardiac arrest  #Hypoalbuminemia     Diet NPO in immediate post arrest setting    Plan:  -Monitor LFTs  -Bowel regimen in place  -GI Prophylaxis: PPI; Protonix 40 mg daily     == Renal, Electrolytes ==  #Concern for Acute Renal Injury 2/2 cardiac arrest  #Lactic acidosis    Plan:  -Avoid nephrotoxins, renally dose meds  -Monitor urine output  -FBG and diuresis as above    == Infectious Disease ==  #Aspiration Pneumonia- in the setting of arrest.  CXR/CAP as  "above.   #Leukocytosis    Plan:  -Continue CTX x 5D for aspiration coverage  -Monitor for signs of infection  -Avoid fevers     == Hematology ==  # Anemia of critical illness    Hgb stable. No e/o bleeding   Receiving heparin for ECMO with ACT goal 180-200    US LE w/ arterial duplex pending  DVT PPX: Heparin as above    Plan:  -Continue Heparin with ACT goal as above  -Transfusion parameters:   -1u PRBC for hbg < 7.0   -1u platelet for plt < 50   -1u FFP for INR > 3   -5u cryoprecipitate for fibrinogen <150     == Endocrinology ==  #Hyperglycemia     Plan  -Insulin infusion as needed    == Integumentary ==  -No skin issues  -CTM cannula sites    == Lines/Tubes/Drains ==  Cannula: right  Airway: ETT  Enteric: OGT  Central access: left fem  Arterial access: right radial, left fem  Urinary: yes  Rectal Tube: no    ICU Checklist  Feeding: NPO  Analgesia: fentanyl infusion  Sedation: propofol infusion   Thrombopx: heparin infusion  Head of bed: RT  Ulcers: PPI  Glucose: insulin infusion   SBT: hold  Bowel regimen: ordered  Indwelling cath: yes, needed  De-escalate meds: none  Code Status: Full Code , eCPR candidate? On support  Family updated by team. Patient seen and discussed with staff physician, Dr. Manisha Navarro.     VANCE Salgado, CNP  Critical Care Cardiology  Securely message with the Vocera Web Console (learn more here)    Objective:  Most recent vital signs:  BP (!) 142/125   Pulse 58   Temp 97.8  F (36.6  C) (Oral)   Resp 20   Ht 1.803 m (5' 11\")   Wt 76 kg (167 lb 8.8 oz)   SpO2 94%   BMI 23.37 kg/m    Temp:  [97.8  F (36.6  C)] 97.8  F (36.6  C)  Pulse:  [] 58  Resp:  [20] 20  BP: ()/() 142/125  FiO2 (%):  [40 %] 40 %  SpO2:  [94 %-100 %] 94 %    Physical exam:  General: critically ill, supine in bed, in NAD  HEENT: PERRL, no scleral icterus or injection  CARDIAC: RRR, no m/r/g appreciated. Peripheral pulses dopplered  RESP: synchronous with mechanical " ventilation, CTAB, no wheezes, rhonchi or crackles appreciated.  GI: soft, non-distended, BS hypoactive  : Urinary catheter present  EXTREMITIES: No BLE edema, pulses 2+. Femoral access site w/o bleeding, dressing C/D/I.   SKIN: No acute lesions appreciated on exposed skin.  NEURO: Intubated and sedated. Unable to assess language or mentation. Unresponsive to noxious stim x 4 extremities. No focal deficit.     Imaging/procedure results:  Results for orders placed or performed during the hospital encounter of 12/31/24 (from the past 24 hours)   CBC with platelets differential    Narrative    The following orders were created for panel order CBC with platelets differential.  Procedure                               Abnormality         Status                     ---------                               -----------         ------                     CBC with platelets and d...[[id number]]  Abnormal            Final result                 Please view results for these tests on the individual orders.   Comprehensive metabolic panel   Result Value Ref Range    Sodium 142 135 - 145 mmol/L    Potassium 3.1 (L) 3.4 - 5.3 mmol/L    Carbon Dioxide (CO2) 18 (L) 22 - 29 mmol/L    Anion Gap 18 (H) 7 - 15 mmol/L    Urea Nitrogen 4.7 (L) 8.0 - 23.0 mg/dL    Creatinine 0.58 (L) 0.67 - 1.17 mg/dL    GFR Estimate >90 >60 mL/min/1.73m2    Calcium 8.4 (L) 8.8 - 10.4 mg/dL    Chloride 106 98 - 107 mmol/L    Glucose 171 (H) 70 - 99 mg/dL    Alkaline Phosphatase 123 40 - 150 U/L    AST 21 0 - 45 U/L    ALT <5 0 - 70 U/L    Protein Total 6.8 6.4 - 8.3 g/dL    Albumin 3.8 3.5 - 5.2 g/dL    Bilirubin Total 0.6 <=1.2 mg/dL   Troponin T, High Sensitivity   Result Value Ref Range    Troponin T, High Sensitivity 33 (H) <=22 ng/L   Lipase   Result Value Ref Range    Lipase 10 (L) 13 - 60 U/L   CBC with platelets and differential   Result Value Ref Range    WBC Count 5.2 4.0 - 11.0 10e3/uL    RBC Count 4.75 4.40 - 5.90 10e6/uL    Hemoglobin 14.2 13.3  - 17.7 g/dL    Hematocrit 39.9 (L) 40.0 - 53.0 %    MCV 84 78 - 100 fL    MCH 29.9 26.5 - 33.0 pg    MCHC 35.6 31.5 - 36.5 g/dL    RDW 12.4 10.0 - 15.0 %    Platelet Count 156 150 - 450 10e3/uL    % Neutrophils 78 %    % Lymphocytes 14 %    % Monocytes 6 %    % Eosinophils 2 %    % Basophils 1 %    % Immature Granulocytes 0 %    NRBCs per 100 WBC 0 <1 /100    Absolute Neutrophils 4.0 1.6 - 8.3 10e3/uL    Absolute Lymphocytes 0.7 (L) 0.8 - 5.3 10e3/uL    Absolute Monocytes 0.3 0.0 - 1.3 10e3/uL    Absolute Eosinophils 0.1 0.0 - 0.7 10e3/uL    Absolute Basophils 0.0 0.0 - 0.2 10e3/uL    Absolute Immature Granulocytes 0.0 <=0.4 10e3/uL    Absolute NRBCs 0.0 10e3/uL   XR Chest 2 Views    Narrative    EXAM: XR CHEST 2 VIEWS  12/31/2024 8:14 AM     HISTORY:  chest pain       COMPARISON:  Chest radiograph 10/19/21    FINDINGS:   Trachea is midline. Cardiac silhouette is within normal limits.  Pulmonary vasculature is distinct. Diffuse hazy appearance throughout  the lungs without focal consolidative opacity. No pleural effusion. No  pneumothorax.    No acute osseous abnormality. Degenerative changes. Visualized soft  tissues and upper abdomen are unremarkable.         Impression    IMPRESSION:   Diffuse hazy appearance of the lungs without focal consolidative  opacity. Findings are nonspecific, may represent atypical infection,  inflammation, or edema.    I have personally reviewed the examination and initial interpretation  and I agree with the findings.    AYLEEN WATSON MD         SYSTEM ID:  [id number]   EKG 12-lead, tracing only   Result Value Ref Range    Systolic Blood Pressure  mmHg    Diastolic Blood Pressure  mmHg    Ventricular Rate 68 BPM    Atrial Rate 68 BPM    WA Interval 172 ms    QRS Duration 92 ms     ms    QTc 474 ms    P Axis 70 degrees    R AXIS 70 degrees    T Axis -56 degrees    Interpretation ECG       Sinus rhythm with Premature atrial complexes  Marked ST abnormality, possible anterior  subendocardial injury  Abnormal ECG     Glucose by meter   Result Value Ref Range    GLUCOSE BY METER POCT 158 (H) 70 - 99 mg/dL   Troponin T, High Sensitivity   Result Value Ref Range    Troponin T, High Sensitivity 91 (H) <=22 ng/L   iStat Gases Electrolytes ICA Glucose Venous, POCT   Result Value Ref Range    CPB Applied No     Hematocrit POCT 42 40 - 53 %    Calcium, Ionized Whole Blood POCT 4.5 4.4 - 5.2 mg/dL    Glucose Whole Blood POCT 158 (H) 70 - 99 mg/dL    Bicarbonate Venous POCT 21 21 - 28 mmol/L    Hemoglobin POCT 14.3 13.3 - 17.7 g/dL    Potassium POCT 3.0 (L) 3.4 - 5.3 mmol/L    Sodium POCT 145 135 - 145 mmol/L    pCO2 Venous POCT 43 40 - 50 mm Hg    pO2 Venous POCT 21 (L) 25 - 47 mm Hg    pH Venous POCT 7.28 (L) 7.32 - 7.43    O2 Sat, Venous POCT 28 (L) 70 - 75 %    Base Excess/Deficit (+/-) POCT -6.0 (L) -3.0 - 3.0 mmol/L   iStat Troponin, POCT   Result Value Ref Range    TROPPC POCT 0.49 (HH) <=0.12 ug/L   -Intubation    Narrative    Antonio Kelley MD     12/31/2024  9:12 AM  -Intubation    Date/Time: 12/31/2024 9:05 AM    Performed by: Antonio Kelley MD  Authorized by: Antonio Kelley MD    Emergent condition/consent implied    Pre-procedure details:     Indications: cardio/pulmonary arrest      Patient status:  Unresponsive    Neck mobility: normal      Pharmacologic strategy: RSI      Induction agents:  Etomidate    Paralytics:  Succinylcholine  Procedure details:     Preoxygenation:  Bag valve mask    CPR in progress: no      Number of attempts:  1  Successful intubation attempt details:     Intubation technique: video assisted      Laryngoscope blade:  Mac 4    Bougie used: no      Tube size (mm):  7.5    Tube type:  Cuffed    Tube visualized through cords: yes    Placement assessment:     ETT at teeth/gumline (cm):  23    Tube secured with:  ETT duffy    Breath sounds:  Equal    Placement verification: chest rise, colorimetric ETCO2, direct   visualization, esophageal  detector, tube exhalation and waveform ETCO2    Post-procedure details:     Procedure completion:  Patient tolerated the procedure well with no   immediate complications    PROCEDURE    Patient Tolerance:  Patient tolerated the procedure well with no immediate   complications   Activated clotting time celite, POCT   Result Value Ref Range    Activated Clotting Time (Celite) POCT 284 (H) 74 - 150 seconds   Cardiac Catheterization    Narrative      Ost RCA to Prox RCA lesion is 95% stenosed.    Mid RCA lesion is 50% stenosed.    Dist RCA lesion is 60% stenosed.    RPAV lesion is 60% stenosed.    Mid LM to Prox LAD lesion is 80% stenosed.    Ost Cx to Prox Cx lesion is 80% stenosed.    Ramus lesion is 80% stenosed.    Central Venous Catheter  was placed using a CATH CENTRAL LINE MULTI   LUMEN [id number]. Ultrasound was used to visualize the left   femoral vein. Placement was successful.    Arterial Line was placed. Ultrasound was used to visualize the left   femoral artery left femoral artery. Placement was successful.    Arterial Line was placed.  the right radial artery right radial artery.   Placement was successful.    Severe obstructive multivessel coronary artery disease  Severe obstructive distal left main at a complex calcified bifurcation   lesion of the ostial LAD, a large ramus branch, as well as the ostial left   circumflex  Severe obstructive disease of the ostial RCA as well as the distal RCA  Cardiothoracic surgery consult placed for bypass evaluation to be done as   inpatient  Successful insertion of VA ECMO as well as distal reperfusion cannula  Successful insertion of right arterial radial line  Successful insertion of left femoral venous triple-lumen central line  Successful insertion of left femoral arterial arterial line     ABO/Rh type and screen    Narrative    The following orders were created for panel order ABO/Rh type and screen.  Procedure                                Abnormality         Status                     ---------                               -----------         ------                     Adult Type and Screen[[id number]]                            Preliminary result           Please view results for these tests on the individual orders.   CBC with platelets differential    Narrative    The following orders were created for panel order CBC with platelets differential.  Procedure                               Abnormality         Status                     ---------                               -----------         ------                     CBC with platelets and d...[[id number]]  Abnormal            Final result                 Please view results for these tests on the individual orders.   CBC with platelets   Result Value Ref Range    WBC Count 16.4 (H) 4.0 - 11.0 10e3/uL    RBC Count 4.10 (L) 4.40 - 5.90 10e6/uL    Hemoglobin 12.4 (L) 13.3 - 17.7 g/dL    Hematocrit 35.0 (L) 40.0 - 53.0 %    MCV 85 78 - 100 fL    MCH 30.2 26.5 - 33.0 pg    MCHC 35.4 31.5 - 36.5 g/dL    RDW 12.5 10.0 - 15.0 %    Platelet Count 207 150 - 450 10e3/uL   Calcium ionized whole blood   Result Value Ref Range    Calcium Ionized Whole Blood 4.1 (L) 4.4 - 5.2 mg/dL   Glucose whole blood   Result Value Ref Range    Glucose 199 (H) 70 - 99 mg/dL   Lactic acid whole blood   Result Value Ref Range    Lactic Acid 2.9 (H) 0.7 - 2.0 mmol/L   Adult Type and Screen   Result Value Ref Range    SPECIMEN EXPIRATION DATE [id number]    CBC with platelets and differential   Result Value Ref Range    WBC Count 16.4 (H) 4.0 - 11.0 10e3/uL    RBC Count 4.10 (L) 4.40 - 5.90 10e6/uL    Hemoglobin 12.4 (L) 13.3 - 17.7 g/dL    Hematocrit 35.0 (L) 40.0 - 53.0 %    MCV 85 78 - 100 fL    MCH 30.2 26.5 - 33.0 pg    MCHC 35.4 31.5 - 36.5 g/dL    RDW 12.5 10.0 - 15.0 %    Platelet Count 207 150 - 450 10e3/uL    % Neutrophils 90 %    % Lymphocytes 5 %    % Monocytes 4 %    % Eosinophils 0 %    % Basophils 0 %    %  Immature Granulocytes 1 %    NRBCs per 100 WBC 0 <1 /100    Absolute Neutrophils 14.8 (H) 1.6 - 8.3 10e3/uL    Absolute Lymphocytes 0.8 0.8 - 5.3 10e3/uL    Absolute Monocytes 0.6 0.0 - 1.3 10e3/uL    Absolute Eosinophils 0.0 0.0 - 0.7 10e3/uL    Absolute Basophils 0.0 0.0 - 0.2 10e3/uL    Absolute Immature Granulocytes 0.1 <=0.4 10e3/uL    Absolute NRBCs 0.0 10e3/uL   Blood gas arterial   Result Value Ref Range    pH Arterial 7.42 7.35 - 7.45    pCO2 Arterial 30 (L) 35 - 45 mm Hg    pO2 Arterial 56 (L) 80 - 105 mm Hg    FIO2 40     Bicarbonate Arterial 19 (L) 21 - 28 mmol/L    Base Excess/Deficit Arterial -4.0 (L) -3.0 - 3.0 mmol/L    Elías's Test Artline     Oxyhemoglobin Arterial 89 (L) 92 - 100 %    O2 Sat, Arterial 90.9 (L) 96.0 - 97.0 %    Narrative    In healthy individuals, oxyhemoglobin (O2Hb) and oxygen saturation (SO2) are approximately equal. In the presence of dyshemoglobins, oxyhemoglobin can be considerably lower than oxygen saturation.   Blood gas ELS venous   Result Value Ref Range    pH ELS Venous 7.37 7.32 - 7.43    pCO2 ELS Venous 37 (L) 40 - 50 mm Hg    pO2 ELS Venous 43 25 - 47 mm Hg    Bicarbonate ELS Venous 21 21 - 28 mmol/L    Base Excess/Deficit Venous -3.4 (L) -3.0 - 3.0 mmol/L    FIO2 40     Oxyhemoglobin ELS Venous 76 %    O2 Saturation ELS Venous 77.6 (H) 70.0 - 75.0 %    Narrative    In healthy individuals, oxyhemoglobin (O2Hb) and oxygen saturation (SO2) are approximately equal. In the presence of dyshemoglobins, oxyhemoglobin can be considerably lower than oxygen saturation.   Blood gas ELS arterial   Result Value Ref Range    pH ELS Arterial 7.42 7.35 - 7.45    pCO2 ELS Arterial 29 (L) 35 - 45 mm Hg    pO2 ELS Arterial 286 (H) 80 - 105 mm Hg    Bicarbonate ELS Arterial 18 (L) 21 - 28 mmol/L    Base Excess/Deficit Arterial -5.0 (L) -3.0 - 3.0 mmol/L    FIO2 40     Oxyhemoglobin ELS Arterial 99 75 - 100 %    O2 Saturation ELS Arterial >100.0 (H) 96.0 - 97.0 %    Narrative    In  healthy individuals, oxyhemoglobin (O2Hb) and oxygen saturation (SO2) are approximately equal. In the presence of dyshemoglobins, oxyhemoglobin can be considerably lower than oxygen saturation.   Glucose by meter   Result Value Ref Range    GLUCOSE BY METER POCT 198 (H) 70 - 99 mg/dL   Urine Drug Screen    Narrative    The following orders were created for panel order Urine Drug Screen.  Procedure                               Abnormality         Status                     ---------                               -----------         ------                     Urine Drug Screen Panel[[id number]]      Abnormal            Final result                 Please view results for these tests on the individual orders.   UA with Microscopic reflex to Culture    Specimen: Urine, Rodriguez Catheter   Result Value Ref Range    Color Urine Light Yellow Colorless, Straw, Light Yellow, Yellow    Appearance Urine Clear Clear    Glucose Urine 70 (A) Negative mg/dL    Bilirubin Urine Negative Negative    Ketones Urine 20 (A) Negative mg/dL    Specific Gravity Urine 1.015 1.003 - 1.035    Blood Urine Small (A) Negative    pH Urine 6.0 5.0 - 7.0    Protein Albumin Urine 50 (A) Negative mg/dL    Urobilinogen Urine Normal Normal, 2.0 mg/dL    Nitrite Urine Negative Negative    Leukocyte Esterase Urine Negative Negative    Mucus Urine Present (A) None Seen /LPF    RBC Urine 9 (H) <=2 /HPF    WBC Urine 4 <=5 /HPF    Narrative    Urine Culture not indicated   Urine Drug Screen Panel   Result Value Ref Range    Amphetamines Urine Screen Negative Screen Negative    Barbituates Urine Screen Negative Screen Negative    Benzodiazepine Urine Screen Positive (A) Screen Negative    Cannabinoids Urine Screen Positive (A) Screen Negative    Cocaine Urine Screen Negative Screen Negative    Fentanyl Qual Urine Screen Positive (A) Screen Negative    Opiates Urine Screen Negative Screen Negative    PCP Urine Screen Negative Screen Negative   Activated clotting  time celite, POCT   Result Value Ref Range    Activated Clotting Time (Celite) POCT 235 (H) 74 - 150 seconds   CT Head w/o Contrast    Narrative    CT HEAD W/O CONTRAST 12/31/2024 11:19 AM    History: Cardiac Arrest, Anoxic Encephalopathy; post cardiac arrest  day 3; Headache; r/o Subarachnoid hemorrhage; No sudden severe  headache     Comparison: None.    Technique: Using multidetector thin collimation helical acquisition  technique, axial, coronal and sagittal CT images from the skull base  to the vertex were obtained without intravenous contrast.   (topogram) image(s) also obtained and reviewed.    Findings: There is no intracranial hemorrhage, mass effect, or midline  shift. Gray/white matter differentiation in both cerebral hemispheres  is preserved. Ventricles are proportionate to the cerebral sulci. The  basal cisterns are clear. Mild generalized parenchymal volume loss.  Patchy white matter hypoattenuation, most likely represents chronic  small vessel ischemic disease.    The bony calvaria and the bones of the skull base are normal. Mucous  retention cyst in the left maxillary sinus. Mastoid air cells are  clear.       Impression    Impression: No acute intracranial hemorrhage.     JENNI FAUST MD         SYSTEM ID:  [id number]   EKG 12-lead, tracing only   Result Value Ref Range    Systolic Blood Pressure  mmHg    Diastolic Blood Pressure  mmHg    Ventricular Rate 61 BPM    Atrial Rate 61 BPM    MT Interval 170 ms    QRS Duration 84 ms     ms    QTc 491 ms    P Axis 12 degrees    R AXIS -23 degrees    T Axis -58 degrees    Interpretation ECG       Sinus rhythm  ST & T wave abnormality, consider lateral ischemia  QTcB >= 480 msec  Abnormal ECG  When compared with ECG of 31-Dec-2024 08:59, (unconfirmed)  Significant changes have occurred       *Note: Due to a large number of results and/or encounters for the requested time period, some results have not been displayed. A complete set of results can  be found in Results Review.

## 2024-12-31 NOTE — PHARMACY-VANCOMYCIN DOSING SERVICE
Pharmacy Vancomycin Initial Note  Date of Service 2024  Patient's  1960  64 year old, male    Indication: Aspiration Pneumonia    Current estimated CrCl = Estimated Creatinine Clearance: 136.5 mL/min (A) (based on SCr of 0.58 mg/dL (L)).    Creatinine for last 3 days  2024:  7:52 AM Creatinine 0.58 mg/dL    Recent Vancomycin Level(s) for last 3 days  No results found for requested labs within last 3 days.      Vancomycin IV Administrations (past 72 hours)        No vancomycin orders with administrations in past 72 hours.                    Nephrotoxins and other renal medications (From now, onward)      Start     Dose/Rate Route Frequency Ordered Stop    24 1200  vancomycin (VANCOCIN) 1,500 mg in 0.9% NaCl 250 mL intermittent infusion         1,500 mg  166.7 mL/hr over 90 Minutes Intravenous EVERY 24 HOURS 24 1146      24 1030  norepinephrine (LEVOPHED) 16 mg in  mL infusion MAX CONC CENTRAL LINE         0.01-0.6 mcg/kg/min × 88.1 kg  0.8-49.6 mL/hr  Intravenous CONTINUOUS 24 1024      24 1030  vasopressin 1 unit/mL MAX Conc (PITRESSIN) infusion         0.5-4 Units/hr  0.5-4 mL/hr  Intravenous CONTINUOUS 24 1024              Contrast Orders - past 72 hours (72h ago, onward)      Start     Dose/Rate Route Frequency Stop    24 1033  iopamidol (ISOVUE-370) solution  Status:  Discontinued           ONCE PRN 24 1046                Plan:  Start vancomycin  1500 mg IV q24h.   Vancomycin monitoring method: Trough (Method 2 = manual dose calculation)  Vancomycin therapeutic monitoring goal: 15-20 mg/L  Pharmacy will check vancomycin levels as appropriate in 1-3 Days.    Serum creatinine levels will be ordered daily for the first week of therapy and at least twice weekly for subsequent weeks.      Marlon Schmitt [facility]

## 2024-12-31 NOTE — PROGRESS NOTES
Patient was intubated with 7.5 cm ETT @ 24 teeth in the ED after a cardiac arrest. One attempt was made for intubation. Patient went to cath lab for ECMO cannulation. Patient transferred to CT then to  ICU with no complications.     Ventilation mode: CMV/AC  Tidal Volume: 430 mL  Respiratory Rate: 18  PEEP: 5+  FiO2: 40%     Leticia Rincon RT on 12/31/2024 at 11:24 AM

## 2024-12-31 NOTE — PROGRESS NOTES
[facility]    ECLS Cannulation Note:     Date on: 12/31/2024  Time on: 09:45  Cannulating Physician: Joi  Pre-cannulation ABG: pH 7.16/PaCO2 48/ PaO2 113/HCO3 17.3/Lactate 6.6  ABG Time: 09:35    Arterial Cannula: 17 Fr. In the Right Femoral Artery  Venous Cannula: 25 Fr. In the Right Femoral Vein  Distal Perfusion Catheter: 8 Fr. In the Right Superficial Femoral Artery    ECMO components include:  Console Serial Number: [id number]  Circuit Lot Number: [id number]  Oxygenator Lot Number: [id number]    Cannulation was performed in the Cath Lab, placement was verified by fluoroscopy, cannula position is good.    Patient's blood type is A, positive.    FLORIN James, RRT  ECMO Specialist  12/31/2024 9:50 AM

## 2024-12-31 NOTE — PROGRESS NOTES
RT, pharmacist, MD Kelley at bedside. Pads and jaspal in place.  8:58AM medications given by Lana VAUGHN,  20mg Etomidate  150mg Suc  24 at the lip.  Xray called

## 2024-12-31 NOTE — PROGRESS NOTES
ECMO Attending Progress Note  2024    Erwin Aguilar is a 64 year old male who was cannulated for ECMO 24 due to VT arrest with ROSC and shock in the setting of MV disease including LM.    Cannulation Site:  17 Fr in the R femoral artery  25 Fr in the R femoral vein  8Fr in the RSFA    Interval events: admitted to the ICU and stabilized, CVTS consulted    Physical Exam:  Temp:  [97.7  F (36.5  C)-98.2  F (36.8  C)] 98.2  F (36.8  C)  Pulse:  [] 63  Resp:  [14-20] 14  BP: ()/() 142/125  MAP:  [65 mmHg-88 mmHg] 76 mmHg  Arterial Line BP: ()/(56-76) 98/71  FiO2 (%):  [40 %-90 %] 90 %  SpO2:  [89 %-100 %] 96 %    Intake/Output Summary (Last 24 hours) at 2024 1255  Last data filed at 2024 1200  Gross per 24 hour   Intake 215.27 ml   Output 530 ml   Net -314.73 ml    FiO2 (%): 90 %, Resp: 14, Vent Mode: CMV/AC, Resp Rate (Set): 18 breaths/min, Tidal Volume (Set, mL): 430 mL, PEEP (cm H2O): (S) 8 cmH2O, Resp Rate (Set): 18 breaths/min, Tidal Volume (Set, mL): 430 mL, PEEP (cm H2O): (S) 8 cmH2O     Labs:  Recent Labs   Lab 24  1110 24  1109   PH  --  7.42   PCO2  --  30*   PO2  --  56*   HCO3  --  19*   O2PER 40  40 40      Recent Labs   Lab 24  1107 24  0856 24  0752   WBC 16.4*  16.4*  --  5.2   HGB 12.4*  12.4* 14.3 14.2     Creatinine   Date Value Ref Range Status   2024 0.64 (L) 0.67 - 1.17 mg/dL Final   2024 0.58 (L) 0.67 - 1.17 mg/dL Final   05/10/2024 0.69 0.67 - 1.17 mg/dL Final   05/10/2024 0.71 0.67 - 1.17 mg/dL Final   2021 0.68 0.66 - 1.25 mg/dL Final   2020 0.65 (L) 0.66 - 1.25 mg/dL Final   2020 0.85 0.66 - 1.25 mg/dL Final   2020 0.64 (L) 0.66 - 1.25 mg/dL Final       Blood Flow (Circuit) LPM: 3.83 LPM  Sweep LPM: 2 LPM  Sweep FiO2   %: 70 %  ACT  (seconds): 235 seconds  Pulse Oximetry  (SpO2%): 98 %  Arterial Pressure  mmH mmHg      ECMO Issues including assessments and plan on DOS  12/31/2024:  Neuro: Sedated for mechanical ventilation and ECMO.  No acute distress.  NIRS stable  b/l  RASS goal: -2-3, moving in the lab  CV: Cardiogenic shock.  Hemodynamically stable off pressors, pulse pressure 30mmHg. CVTS consult for cabg  Pulm: Slightly hypoxemic on moderate vent settings  FEN/Renal: Electrolytes stable w/ replacement protocols in place, Cr stable, UOP stable  Heme: ACT goal: 180-200, Hemoglobin stable .  Minimal oozing around the ECMO cannulas.  ID: Receiving empiric antibiotics  Cannulae: Position is acceptable on exam and the available imaging.  Distal perfusion cannula is in place and patent.  Extremities are well-perfused.     I have personally reviewed the ECMO flows, oxygenation and CO2 clearance, anticoagulation, and cannula position.  I have also personally assessed the patient's systemic response with hemodynamics, oxygenation, ventilation, and bleeding.       The patient requires continued ECMO support and management in the ICU.  I have discussed patient care and treatment plan with the primary team.      Manisha Geronimo MD  Cardiology critical care  Pager

## 2024-12-31 NOTE — CONSULTS
CARDIOTHORACIC SURGERY CONSULT NOTE  12/31/2024      Reason for Consult: CABG evaluation       ASSESSMENT/PLAN: Patient is a 64 year old male with a history of A. Fib, DM2, HTN, and PVD presented with [facility] ED with concerns for ACS. Had in-hospital VT arrest that resolved with 1 round of CPR, epi, and defibrillation. Ultimately placed on ECMO. Angiogram completed demonstrating severe multivessel disease.     #Refractory VF Cardiac arrest   #Shock requiring VA ECMO  #CAD  #Cardiomyopathy      Ost RCA to Prox RCA lesion is 95% stenosed.    Mid RCA lesion is 50% stenosed.    Dist RCA lesion is 60% stenosed.    RPAV lesion is 60% stenosed.    Mid LM to Prox LAD lesion is 80% stenosed.    Ost Cx to Prox Cx lesion is 80% stenosed.    Ramus lesion is 80% stenosed.      ECHO pending   CT chest pending  Carotid US ordered by CICU team   LFTs normal, agree with trending, ordered by CICU team   UA pending   Type and Screen pending   Ordered BLL vein mapping   Other cares per primary team  Thank you for the opportunity to participate in the care of this patient.      Procedure Type: Isolated CABG  Perioperative Outcome Estimate %  Operative Mortality 3.62%  Morbidity & Mortality 17%  Stroke 2.3%  Renal Failure 2%  Reoperation 4.55%  Prolonged Ventilation 7.66%  Deep Sternal Wound Infection 0.17%  Long Hospital Stay (>14 days) 8.89%  Short Hospital Stay (<6 days) 26.7%      Clinical Summary  Planned Surgery: Isolated CABG, Urgent, First cardiovascular surgery  Demographics: 64 year old, White, male, 76kg, 180cm, BMI: 23.5 kg/m   Lab Values: Creatinine: 0.58 mg/dL, Hematocrit: 39%, WBC Count: 5.2 10 /?L, Platelet Count: 595957 cells/?L  PreOp Medications: ACE Inhibitors/ARBs <= 48 hrs, Inotropes <= 48 hrs, Insulin diabetes control  Substance Abuse: Former smoker  Risk Factors / Comorbidities: Insulin-dependent Diabetes Mellitus, Hypertension  Cardiac Status: Cardiogenic Shock, Resuscitation <= 1hr, Preop ECMO (any), Ejection  Fraction = 55%  Coronary Artery Disease: 3 vessel disease, Proximal LAD Stenosis >= 70%, Non-ST Elevation MI, MI: <= 6 Hrs  Arrhythmia: Recent A-fib, Paroxysmal, Recent V. Tach / V. Fib        Patient and plan discussed with attending, Dr. Denise.    Antonio Hernandez DNP APRN CNP CCRN   Cardiovascular Surgery   Available on Vocea or Secure Chat   Pager [phone]        ________________________________________________________________________________________________    HPI: Erwin Aguilar is a 64 year old male with a past medical history of A. 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 History of ETOH abuse with large varices, most recent follow up with GI in 08/2024 that demonstrated liver fibrosis regression, sober for the last 10 years. He presented to the emergency department on the [place] here at the  for c/o arm pain, vomiting, and diarrhea with c/f ACS. Patient was evaluated by ED staff and 12 lead demonstrated some ST depression in the lateral leads which was communicated to interventional staff, after decision was made to go to the cath lab patient had a VT cardiac arrest which resolved with 1 round of CPR, epinephrine, defibrillation. Patient's was intubated for airway protection and his rhythm after ROSC was afib with RVR. He had an adequate BP, however after rapid transport to the cath lab patient became hypotensive and was subsequently cannulated for VA ECMO.       PMH:  Past Medical History:   Diagnosis Date    Actinic keratosis     aldara    Anxiety 12/1997    Cancer (H)     squamous cell skin CA    Cauda equina spinal cord injury (H)     Chronic sinusitis 05/01/2016    Cirrhosis of liver (H) 04/01/2014    Not biopsy-proven as of 4/1/14.      Depressive disorder     Diastasis recti     Esophageal reflux     Esophageal varices in cirrhosis (H) 04/01/2014    Hospitalized for UGI blee 3/28/14, endoscopy revealed bleeding varices.    Essential hypertension, benign      Intermittent asthma     Mild depression     Mixed hyperlipidemia     Nasal polyps 05/01/2016    Osteoarthritis of lumbar spine, unspecified spinal osteoarthritis complication status     Other chronic pain     Paroxysmal atrial fibrillation (H) 09/22/2021    SCCA (squamous cell carcinoma) of skin     Seasonal allergic conjunctivitis     Type II or unspecified type diabetes mellitus without mention of complication, not stated as uncontrolled     Unspecified site of spinal cord injury without evidence of spinal bone injury     due to back surgery       Clinically Significant Risk Factors Present on Admission        # Hypokalemia: Lowest K = 3 mmol/L in last 2 days, will replace as needed    # Hypocalcemia: Lowest Ca = 8.4 mg/dL in last 2 days, will monitor and replace as appropriate      # Drug Induced Coagulation Defect: home medication list includes an anticoagulant medication  # Drug Induced Platelet Defect: home medication list includes an antiplatelet medication   # Hypertension: Noted on problem list               # Asthma: noted on problem list          PSH:  Past Surgical History:   Procedure Laterality Date    ABDOMEN SURGERY  2014    AMPUTATE LEG BELOW KNEE Right 5/9/2024    Procedure: Right below knee amputation (transtibial amputation);  Surgeon: Kurtis Nye MD;  [facility]: [facility]    ANGIOGRAM Right 4/23/2024    Procedure: Ultrasound guided percutaneous transarterial left common femoral artery access, diagnostic aortoiliac angiogram, selective cannulation of right comon iliac, righ external iliac, right common femoral, and right superficial femoral artery, balloon angioplasty of right superficial femoral artery, 6mm x 12 cm self-expanding drug-coated stent placement of right superficial femoral artery, lef    BACK SURGERY  August 2009    COLONOSCOPY N/A 5/12/2016    Procedure: COMBINED COLONOSCOPY, SINGLE OR MULTIPLE BIOPSY/POLYPECTOMY BY BIOPSY;  Surgeon: Ana Paula Urbina MD;  Location:  GI     DECOMPRESSION CUBITAL TUNNEL Left 8/31/2023    Procedure: LEFT CUBITAL TUNNEL RELEASE,;  Surgeon: Deny Dixon MD;  Location: [facility]    ENDOSCOPY UPPER, COLONOSCOPY, COMBINED  10/19/2011    Procedure:COMBINED ENDOSCOPY UPPER, COLONOSCOPY; Upper Endoscopy, Colonoscopy with Polypectomy x4; Surgeon:AMBAR RODRÍGUEZ; Location:[facility]    ENT SURGERY  1-2016    Ongoing since then    ESOPHAGOSCOPY, GASTROSCOPY, DUODENOSCOPY (EGD), COMBINED  3/28/2014    Procedure: COMBINED ESOPHAGOSCOPY, GASTROSCOPY, DUODENOSCOPY (EGD);  EGD, Gastric Biopsies, Esophageal Banding;  Surgeon: Reyna Tovar MD;  [facility]    ESOPHAGOSCOPY, GASTROSCOPY, DUODENOSCOPY (EGD), COMBINED  6/9/2014    Procedure: COMBINED ESOPHAGOSCOPY, GASTROSCOPY, DUODENOSCOPY (EGD);  Surgeon: Curtis Mendez MD;  [facility]: [facility]    ESOPHAGOSCOPY, GASTROSCOPY, DUODENOSCOPY (EGD), COMBINED  7/24/2014    Procedure: COMBINED ESOPHAGOSCOPY, GASTROSCOPY, DUODENOSCOPY (EGD);  Surgeon: Gerard Samaniego MD;  [facility]    ESOPHAGOSCOPY, GASTROSCOPY, DUODENOSCOPY (EGD), COMBINED N/A 10/31/2014    Procedure: COMBINED ESOPHAGOSCOPY, GASTROSCOPY, DUODENOSCOPY (EGD);  Surgeon: Gerard Samaniego MD;  [facility]    ESOPHAGOSCOPY, GASTROSCOPY, DUODENOSCOPY (EGD), COMBINED N/A 5/12/2016    Procedure: COMBINED ESOPHAGOSCOPY, GASTROSCOPY, DUODENOSCOPY (EGD);  Surgeon: Ana Paula Urbina MD;  [facility]    ESOPHAGOSCOPY, GASTROSCOPY, DUODENOSCOPY (EGD), COMBINED N/A 8/2/2018    Procedure: COMBINED ESOPHAGOSCOPY, GASTROSCOPY, DUODENOSCOPY (EGD);  EGD;  Surgeon: Yu Wagner MD;  [facility]: [facility]    HCL SQUAMOUS CELL CARCINOMA AG  10/07    left forearm    HERNIORRHAPHY UMBILICAL  11/8/2012    Procedure: HERNIORRHAPHY UMBILICAL;  Open Umbilical Hernia Repair With Mesh ;  Surgeon: Chase Nicholson MD;  [facility]    INSERT STIMULATOR DORSAL COLUMN N/A 6/28/2018    Procedure: INSERT STIMULATOR DORSAL COLUMN;;   Surgeon: Elvis Sauceda MD;  [facility]    IR LOWER EXTREMITY ANGIOGRAM RIGHT  2024    neuro stimulator  2010    RELEASE CARPAL TUNNEL Left 2023    Procedure: LEFT OPEN CARPAL TUNNEL RELEASE,;  Surgeon: Deny Dixon MD;  Location: [facility]    RELEASE TRIGGER FINGER Left 2023    Procedure: Release left trigger finger thumb;  Surgeon: Deny Dixon MD;  Location: [facility] OR    REMOVE GENERATOR STIMULATOR (LOCATION) N/A 2018    Procedure: REMOVE GENERATOR STIMULATOR (LOCATION);  Spinal Cord Stimulator and IPG Explant and Re-Implant of SCS System (Leads and IPG);  Surgeon: Elvis Sauceda MD;  [facility]    REPAIR TENDON ACHILLES Right 2020    Procedure: Right achilles debridement and partial calcaneus excision;  Surgeon: Eh Sandoval MD;  Location: [facility]    SURGICAL HISTORY OF -       abcess tooth    SURGICAL HISTORY OF -       L4-5 laminectomy, cauda equina syndrome    SURGICAL HISTORY OF -   +    herniated disk repair    TONSILLECTOMY  10 1964    TRANSPOSITION ULNAR NERVE (ELBOW)      right    ZZC APPENDECTOMY         FH:  Family History   Problem Relation Age of Onset    Cancer Mother         lung    Breast Cancer Mother     Other Cancer Mother     Cancer Father         esophogeal, GERD    Snoring Father     Diabetes Brother         amputation, Type 1    Diabetes Brother     Diabetes Brother     Cancer - colorectal No family hx of     Glaucoma No family hx of     Macular Degeneration No family hx of     Anesthesia Reaction No family hx of     Venous thrombosis No family hx of        SH:  Social History     Socioeconomic History    Marital status: Single    Number of children: 0   Occupational History    Occupation: sales     Employer: UNEMPLOYED   Tobacco Use    Smoking status: Former     Current packs/day: 0.00     Types: Cigarettes     Start date: 10/13/1983     Quit date: 1984     Years since quittin.5     Passive  exposure: Past    Smokeless tobacco: Never   Vaping Use    Vaping status: Every Day    Substances: THC, CBD    Devices: Refillable tank   Substance and Sexual Activity    Alcohol use: Not Currently     Comment: - last drink was 3/28/14    Drug use: Yes     Frequency: 2.0 times per week     Types: Marijuana     Comment: medical marijuana - vape daily    Sexual activity: Not Currently     Partners: Female     Birth control/protection: Abstinence   Other Topics Concern    Parent/sibling w/ CABG, MI or angioplasty before 65F 55M? No     Social Drivers of Health     Financial Resource Strain: Low Risk  (1/9/2024)    Financial Resource Strain     Within the past 12 months, have you or your family members you live with been unable to get utilities (heat, electricity) when it was really needed?: No   Food Insecurity: Low Risk  (1/9/2024)    Food Insecurity     Within the past 12 months, did you worry that your food would run out before you got money to buy more?: No     Within the past 12 months, did the food you bought just not last and you didn t have money to get more?: No   Transportation Needs: High Risk (1/9/2024)    Transportation Needs     Within the past 12 months, has lack of transportation kept you from medical appointments, getting your medicines, non-medical meetings or appointments, work, or from getting things that you need?: Yes   Physical Activity: Inactive (12/31/2020)    Exercise Vital Sign     Days of Exercise per Week: 0 days     Minutes of Exercise per Session: 0 min   Social Connections: Unknown (12/31/2020)    Social Connection and Isolation Panel [NHANES]     Marital Status:    Interpersonal Safety: Low Risk  (1/9/2024)    Interpersonal Safety     Do you feel physically and emotionally safe where you currently live?: Yes     Within the past 12 months, have you been hit, slapped, kicked or otherwise physically hurt by someone?: No     Within the past 12 months, have you been humiliated or  emotionally abused in other ways by your partner or ex-partner?: No   Housing Stability: Low Risk  (1/9/2024)    Housing Stability     Do you have housing? : Yes     Are you worried about losing your housing?: No       Home Meds:  Medications Prior to Admission   Medication Sig Dispense Refill Last Dose/Taking    albuterol (PROAIR HFA/PROVENTIL HFA/VENTOLIN HFA) 108 (90 Base) MCG/ACT inhaler INHALE 2 PUFFS BY MOUTH EVERY 6 HOURS AS NEEDED FOR SHORTNESS OF BREATH OR WHEEZING (Patient taking differently: 2 puffs every 6 hours as needed. INHALE 2 PUFFS BY MOUTH EVERY 6 HOURS AS NEEDED FOR SHORTNESS OF BREATH OR WHEEZING) 18 g 1     apixaban ANTICOAGULANT (ELIQUIS ANTICOAGULANT) 5 MG tablet Take 1 tablet (5 mg) by mouth 2 times daily 180 tablet 3     aspirin 81 MG EC tablet Take 1 tablet (81 mg) by mouth daily 84 tablet 0     baclofen (LIORESAL) 10 MG tablet Take 2 tablets (20 mg) by mouth 3 times daily. 360 tablet 3     bisacodyl (DULCOLAX) 5 MG EC tablet Two days prior to exam take two (2) tablets at 4pm. One day prior to exam take two (2) tablets at 4pm 4 tablet 0     blood glucose monitoring (FREESTYLE FREEDOM LITE) meter device kit Use to test blood sugar 3 times daily or as directed. 1 kit 0     Continuous Blood Gluc  (FREESTYLE CACHORRO 2 READER) GASTON USE TO READ BLOOD SUGARS AS PER 'S INSTRUCTIONS 1 each 0     doxazosin (CARDURA) 8 MG tablet Take 1 tablet (8 mg) by mouth at bedtime 90 tablet 1     gabapentin (NEURONTIN) 300 MG capsule Take 600 mg ( two pills) by mouth in the afternoon and 900 mg (three pills)  at bedtime 450 capsule 3     hydrOXYzine HCl (ATARAX) 25 MG tablet Take 1 tablet (25 mg) by mouth every 6 hours as needed for other (adjuvant pain) 30 tablet 0     insulin glargine (LANTUS SOLOSTAR) 100 UNIT/ML pen INJECT 26 UNITS SUBCUTANEOUSLY AT BEDTIME (Patient taking differently: 26 Units at bedtime. INJECT 26 UNITS SUBCUTANEOUSLY AT BEDTIME) 15 mL 1     insulin pen needle (GLOBAL  EASE INJECT PEN NEEDLES) 32G X 4 MM miscellaneous USE AS DIRECTED EVERY  each 1     Lidocaine (LIDOCARE) 4 % Patch Place 1 patch onto the skin every 24 hours To prevent lidocaine toxicity, patient should be patch free for 12 hrs daily. (Patient not taking: Reported on 6/4/2024) 10 patch 0     lidocaine (LMX4) 4 % external cream Apply topically once as needed for mild pain (Patient not taking: Reported on 6/4/2024) 28 g 0     medical cannabis (Patient's own supply) See Admin Instructions (The purpose of this order is to document that the patient reports taking medical cannabis.  This is not a prescription, and is not used to certify that the patient has a qualifying medical condition.)   Flower - PRN       methocarbamol (ROBAXIN) 500 MG tablet Take 1 tablet (500 mg) by mouth 4 times daily 120 tablet 0     ondansetron (ZOFRAN) 4 MG tablet Take 1 tablet (4 mg) by mouth 2 times daily as needed for nausea. 180 tablet 3     oxyCODONE (ROXICODONE) 5 MG tablet Take 1 tablet (5 mg) by mouth 5 times daily As needed for post op pain in addition to 10mg three times daily scheduled. 30 tablet 0     oxyCODONE IR (ROXICODONE) 10 MG tablet Take 1 tablet (10 mg) by mouth 3 times daily as needed for severe pain. 90 tablet 0     polyethylene glycol (GOLYTELY) 236 g suspension Two days before procedure at 5PM fill first container with water. Mix and drink an 8 oz glass every 15 minutes until HALF of the container is gone. Place the remainder in the refrigerator. One day before procedure at 5PM drink second half of bowel prep. Drink an 8 oz glass every 15 minutes until it is gone. Day of procedure 6 hours before arrival time fill the 2nd container with water. Mix and drink an 8 oz glass every 15 minutes until HALF of the container is gone. Discard the remaining solution. 8000 mL 0     promethazine (PHENERGAN) 25 MG tablet TAKE 1 TABLET BY MOUTH 3 TIMES A DAY AS NEEDED FOR NAUSEA 270 tablet 0     propranolol (INDERAL) 40 MG tablet  Take 1 tablet (40 mg) by mouth 3 times daily 270 tablet 2     rosuvastatin (CRESTOR) 40 MG tablet Take 1 tablet (40 mg) by mouth daily 90 tablet 3     sertraline (ZOLOFT) 100 MG tablet TAKE 2 TABLETS BY MOUTH DAILY 180 tablet 2     triamterene-HCTZ (DYAZIDE) 37.5-25 MG capsule Take 1 capsule by mouth every morning 90 capsule 1      Allergies:  Allergies   Allergen Reactions    Levaquin Anaphylaxis and Rash     Swelling in lip and tongue felt thick    Lisinopril Anaphylaxis     Swollen tongue; inability to swallow; drooling; hives; swollen face, neck, angioedema    Acetaminophen      Hx of cirrhosis     Amlodipine      Swelling, hives, possible angioedema      Morphine      b/p dropped and arms went numb  Hypotension    Quinolones Hives    Spironolactone Unknown     Pt believes himself to be allergic to it; cannot find his old records of this.-mjc     Bactrim [Sulfamethoxazole-Trimethoprim] Rash     ROS: Unable to complete ROS as patient intubated and sedated.     Physical Exam:  Temp:  [97.8  F (36.6  C)] 97.8  F (36.6  C)  Pulse:  [] 156  Resp:  [20] 20  BP: ()/() 142/125  SpO2:  [94 %-100 %] 100 %    Gen: Intubated and sedated.   HEENT: normocephalic, atraumatic. Pupils round, equal.   Lungs: Coarse in all fields  CV: RRR, S1S2 normal, no murmur.  Abd: Soft, non-distended, distant/hypoactive bowel sounds.   Musculoskeletal: RBTK amputation.   Neuro: Intubated and sedated. Unresponsive. Moving all extremities.       Labs:  ABG No lab results found in last 7 days.  CBC  Recent Labs   Lab 12/31/24  0856 12/31/24  0752   WBC  --  5.2   HGB 14.3 14.2   PLT  --  156     BMP  Recent Labs   Lab 12/31/24  0856 12/31/24  0752    142   POTASSIUM 3.0* 3.1*   CHLORIDE  --  106   CO2  --  18*   BUN  --  4.7*   CR  --  0.58*   * 171*     LFT  Recent Labs   Lab 12/31/24  0752   AST 21   ALT <5   ALKPHOS 123   BILITOTAL 0.6   ALBUMIN 3.8     Pancreas  Recent Labs   Lab 12/31/24  0752   LIPASE 10*        Imaging:  Recent Results (from the past 24 hours)   XR Chest 2 Views    Narrative    EXAM: XR CHEST 2 VIEWS  12/31/2024 8:14 AM     HISTORY:  chest pain       COMPARISON:  Chest radiograph 10/19/21    FINDINGS:   Trachea is midline. Cardiac silhouette is within normal limits.  Pulmonary vasculature is distinct. Diffuse hazy appearance throughout  the lungs without focal consolidative opacity. No pleural effusion. No  pneumothorax.    No acute osseous abnormality. Degenerative changes. Visualized soft  tissues and upper abdomen are unremarkable.         Impression    IMPRESSION:   Diffuse hazy appearance of the lungs without focal consolidative  opacity. Findings are nonspecific, may represent atypical infection,  inflammation, or edema.    I have personally reviewed the examination and initial interpretation  and I agree with the findings.    AYLEEN WATSON MD         SYSTEM ID:  [id number]

## 2024-12-31 NOTE — ED PROVIDER NOTES
ED Provider Note  [facility]      History     Chief Complaint   Patient presents with    Arm Pain    Nausea, Vomiting, & Diarrhea     HPI  Erwin Aguilar is a 64 year old male who has a past medical history of type 2 diabetes, hypertension, peripheral vascular disease, neuropathic pain presenting with arm pain, vomiting diarrhea.  The patient is concerned he is having a heart attack due to his pain.  Denies chest pain at this point.  No diaphoresis, fevers, shortness of breath.  No leg pain or swelling.  No arm swelling.  He says his pain has been stable and he has not been able to see anyone for an EMG or surgery consult in about a year.  He has his appointments upcoming in February.  Denies any falls or injuries.  He also reports he had some vomiting and diarrhea today.  This is nonbloody.  No black or dark stools.    Past Medical History  Past Medical History:   Diagnosis Date    Actinic keratosis     aldara    Anxiety 12/1997    Cancer (H)     squamous cell skin CA    Cauda equina spinal cord injury (H)     Chronic sinusitis 05/01/2016    Cirrhosis of liver (H) 04/01/2014    Not biopsy-proven as of 4/1/14.      Depressive disorder     Diastasis recti     Esophageal reflux     Esophageal varices in cirrhosis (H) 04/01/2014    Hospitalized for UGI blee 3/28/14, endoscopy revealed bleeding varices.    Essential hypertension, benign     Intermittent asthma     Mild depression     Mixed hyperlipidemia     Nasal polyps 05/01/2016    Osteoarthritis of lumbar spine, unspecified spinal osteoarthritis complication status     Other chronic pain     Paroxysmal atrial fibrillation (H) 09/22/2021    SCCA (squamous cell carcinoma) of skin     Seasonal allergic conjunctivitis     Type II or unspecified type diabetes mellitus without mention of complication, not stated as uncontrolled     Unspecified site of spinal cord injury without evidence of spinal bone injury     due to back surgery     Past  Surgical History:   Procedure Laterality Date    ABDOMEN SURGERY  2014    AMPUTATE LEG BELOW KNEE Right 5/9/2024    Procedure: Right below knee amputation (transtibial amputation);  Surgeon: Kurtis Nye MD;  Location: [facility]    ANGIOGRAM Right 4/23/2024    Procedure: Ultrasound guided percutaneous transarterial left common femoral artery access, diagnostic aortoiliac angiogram, selective cannulation of right comon iliac, righ external iliac, right common femoral, and right superficial femoral artery, balloon angioplasty of right superficial femoral artery, 6mm x 12 cm self-expanding drug-coated stent placement of right superficial femoral artery, lef    BACK SURGERY  August 2009    COLONOSCOPY N/A 5/12/2016    Procedure: COMBINED COLONOSCOPY, SINGLE OR MULTIPLE BIOPSY/POLYPECTOMY BY BIOPSY;  Surgeon: Ana Paula Urbina MD;  [facility]    DECOMPRESSION CUBITAL TUNNEL Left 8/31/2023    Procedure: LEFT CUBITAL TUNNEL RELEASE,;  Surgeon: Deny Dixon MD;  Location: [facility]    ENDOSCOPY UPPER, COLONOSCOPY, COMBINED  10/19/2011    Procedure:COMBINED ENDOSCOPY UPPER, COLONOSCOPY; Upper Endoscopy, Colonoscopy with Polypectomy x4; Surgeon:AMBAR RODRÍGUEZIQ; Location:[facility]    ENT SURGERY  1-2016    Ongoing since then    ESOPHAGOSCOPY, GASTROSCOPY, DUODENOSCOPY (EGD), COMBINED  3/28/2014    Procedure: COMBINED ESOPHAGOSCOPY, GASTROSCOPY, DUODENOSCOPY (EGD);  EGD, Gastric Biopsies, Esophageal Banding;  Surgeon: Reyna Tovar MD;  [facility]    ESOPHAGOSCOPY, GASTROSCOPY, DUODENOSCOPY (EGD), COMBINED  6/9/2014    Procedure: COMBINED ESOPHAGOSCOPY, GASTROSCOPY, DUODENOSCOPY (EGD);  Surgeon: Curtis Mendez MD;  [facility]    ESOPHAGOSCOPY, GASTROSCOPY, DUODENOSCOPY (EGD), COMBINED  7/24/2014    Procedure: COMBINED ESOPHAGOSCOPY, GASTROSCOPY, DUODENOSCOPY (EGD);  Surgeon: Gerard Samaniego MD;  [facility]    ESOPHAGOSCOPY, GASTROSCOPY, DUODENOSCOPY (EGD), COMBINED N/A 10/31/2014     Procedure: COMBINED ESOPHAGOSCOPY, GASTROSCOPY, DUODENOSCOPY (EGD);  Surgeon: Gerard Samaniego MD;  [facility]    ESOPHAGOSCOPY, GASTROSCOPY, DUODENOSCOPY (EGD), COMBINED N/A 5/12/2016    Procedure: COMBINED ESOPHAGOSCOPY, GASTROSCOPY, DUODENOSCOPY (EGD);  Surgeon: Ana Paula Urbina MD;  [facility]    ESOPHAGOSCOPY, GASTROSCOPY, DUODENOSCOPY (EGD), COMBINED N/A 8/2/2018    Procedure: COMBINED ESOPHAGOSCOPY, GASTROSCOPY, DUODENOSCOPY (EGD);  EGD;  Surgeon: Yu Wagner MD;  [facility]    HCL SQUAMOUS CELL CARCINOMA AG  10/07    left forearm    HERNIORRHAPHY UMBILICAL  11/8/2012    Procedure: HERNIORRHAPHY UMBILICAL;  Open Umbilical Hernia Repair With Mesh ;  Surgeon: Chase Nicholson MD;  [facility]    INSERT STIMULATOR DORSAL COLUMN N/A 6/28/2018    Procedure: INSERT STIMULATOR DORSAL COLUMN;;  Surgeon: Elvis Sauceda MD;  [facility]    IR LOWER EXTREMITY ANGIOGRAM RIGHT  4/23/2024    neuro stimulator  2010    RELEASE CARPAL TUNNEL Left 8/31/2023    Procedure: LEFT OPEN CARPAL TUNNEL RELEASE,;  Surgeon: Deny Dixon MD;  Location: [facility]    RELEASE TRIGGER FINGER Left 8/31/2023    Procedure: Release left trigger finger thumb;  Surgeon: Deny Dixon MD;  Location: [facility]    REMOVE GENERATOR STIMULATOR (LOCATION) N/A 6/28/2018    Procedure: REMOVE GENERATOR STIMULATOR (LOCATION);  Spinal Cord Stimulator and IPG Explant and Re-Implant of SCS System (Leads and IPG);  Surgeon: Elvis Sauceda MD;  [facility]    REPAIR TENDON ACHILLES Right 11/11/2020    Procedure: Right achilles debridement and partial calcaneus excision;  Surgeon: Eh Sandoval MD;  Location: [facility]    SURGICAL HISTORY OF -   1/02    abcess tooth    SURGICAL HISTORY OF -   1999    L4-5 laminectomy, cauda equina syndrome    SURGICAL HISTORY OF -   +    herniated disk repair    TONSILLECTOMY  10 1964    TRANSPOSITION ULNAR NERVE (ELBOW)       right    ZZC APPENDECTOMY  1974     albuterol (PROAIR HFA/PROVENTIL HFA/VENTOLIN HFA) 108 (90 Base) MCG/ACT inhaler  apixaban ANTICOAGULANT (ELIQUIS ANTICOAGULANT) 5 MG tablet  aspirin 81 MG EC tablet  baclofen (LIORESAL) 10 MG tablet  bisacodyl (DULCOLAX) 5 MG EC tablet  blood glucose monitoring (FREESTYLE FREEDOM LITE) meter device kit  Continuous Blood Gluc  (FREESTYLE CACHORRO 2 READER) GASTON  doxazosin (CARDURA) 8 MG tablet  gabapentin (NEURONTIN) 300 MG capsule  hydrOXYzine HCl (ATARAX) 25 MG tablet  insulin glargine (LANTUS SOLOSTAR) 100 UNIT/ML pen  insulin pen needle (GLOBAL EASE INJECT PEN NEEDLES) 32G X 4 MM miscellaneous  Lidocaine (LIDOCARE) 4 % Patch  lidocaine (LMX4) 4 % external cream  medical cannabis (Patient's own supply)  methocarbamol (ROBAXIN) 500 MG tablet  ondansetron (ZOFRAN) 4 MG tablet  oxyCODONE (ROXICODONE) 5 MG tablet  oxyCODONE IR (ROXICODONE) 10 MG tablet  polyethylene glycol (GOLYTELY) 236 g suspension  promethazine (PHENERGAN) 25 MG tablet  propranolol (INDERAL) 40 MG tablet  rosuvastatin (CRESTOR) 40 MG tablet  sertraline (ZOLOFT) 100 MG tablet  triamterene-HCTZ (DYAZIDE) 37.5-25 MG capsule      Allergies   Allergen Reactions    Levaquin Anaphylaxis and Rash     Swelling in lip and tongue felt thick    Lisinopril Anaphylaxis     Swollen tongue; inability to swallow; drooling; hives; swollen face, neck, angioedema    Acetaminophen      Hx of cirrhosis     Amlodipine      Swelling, hives, possible angioedema      Morphine      b/p dropped and arms went numb  Hypotension    Quinolones Hives    Spironolactone Unknown     Pt believes himself to be allergic to it; cannot find his old records of this.-mjc     Bactrim [Sulfamethoxazole-Trimethoprim] Rash     Family History  Family History   Problem Relation Age of Onset    Cancer Mother         lung    Breast Cancer Mother     Other Cancer Mother     Cancer Father         esophogeal, GERD    Snoring Father     Diabetes Brother          amputation, Type 1    Diabetes Brother     Diabetes Brother     Cancer - colorectal No family hx of     Glaucoma No family hx of     Macular Degeneration No family hx of     Anesthesia Reaction No family hx of     Venous thrombosis No family hx of      Social History   Social History     Tobacco Use    Smoking status: Former     Current packs/day: 0.00     Types: Cigarettes     Start date: 10/13/1983     Quit date: 1984     Years since quittin.5     Passive exposure: Past    Smokeless tobacco: Never   Vaping Use    Vaping status: Every Day    Substances: THC, CBD    Devices: Sonocine   Substance Use Topics    Alcohol use: Not Currently     Comment: - last drink was 3/28/14    Drug use: Yes     Frequency: 2.0 times per week     Types: Marijuana     Comment: medical marijuana - vape daily      Past medical history, past surgical history, medications, allergies, family history, and social history were reviewed with the patient. No additional pertinent items.   A medically appropriate review of systems was performed with pertinent positives and negatives noted in the HPI, and all other systems negative.    Physical Exam   BP: (!) 162/74  Pulse: 70  Temp: 97.8  F (36.6  C)  Resp: 20    Physical Exam    Physical Exam   Constitutional: oriented to person, place, and time. appears well-developed and well-nourished.   HENT:   Head: Normocephalic and atraumatic.   Neck: Normal range of motion.   Pulmonary/Chest: Effort normal. No respiratory distress.   Cardiac: No murmurs, rubs, gallops. RRR.  Abdominal: Abdomen soft, nontender, nondistended. No rebound tenderness.  MSK: Long bones without deformity or evidence of trauma.  Left arm without swelling, erythema.  Compartments soft.  Radial pulses 2+ and symmetric.  , bicep and tricep strength intact.  Sensation grossly tact of the left arm.  No swelling over the joints of the left arm.  Range of motion normal of the left arm.  Neurological: alert and  oriented to person, place, and time.   Skin: Skin is warm and dry.   Psychiatric:  normal mood and affect.  behavior is normal. Thought content normal.       ED Course, Procedures, & Data     ED Course as of 12/31/24 0904   Tue Dec 31, 2024   0849 Cath lab activated, case discussed with Dr. Tamez.   0858 Cardiology ordering 324 mg asa     -Intubation    Date/Time: 12/31/2024 9:05 AM    Performed by: Antonio Kelley MD  Authorized by: Antonio Kelley MD    Emergent condition/consent implied    Pre-procedure details:     Indications: cardio/pulmonary arrest      Patient status:  Unresponsive    Neck mobility: normal      Pharmacologic strategy: RSI      Induction agents:  Etomidate    Paralytics:  Succinylcholine  Procedure details:     Preoxygenation:  Bag valve mask    CPR in progress: no      Number of attempts:  1  Successful intubation attempt details:     Intubation technique: video assisted      Laryngoscope blade:  Mac 4    Bougie used: no      Tube size (mm):  7.5    Tube type:  Cuffed    Tube visualized through cords: yes    Placement assessment:     ETT at teeth/gumline (cm):  23    Tube secured with:  ETT duffy    Breath sounds:  Equal    Placement verification: chest rise, colorimetric ETCO2, direct visualization, esophageal detector, tube exhalation and waveform ETCO2    Post-procedure details:     Procedure completion:  Patient tolerated the procedure well with no immediate complications    PROCEDURE    Patient Tolerance:  Patient tolerated the procedure well with no immediate complications              EKG Interpretation:      Interpreted by Antonio Kelley MD  Time reviewed: 0820  Symptoms at time of EKG: arm pain   Rhythm: normal sinus   Rate: Normal  Axis: Normal  Ectopy: none  Conduction: normal  ST Segments/ T Waves: Horizontal ST depressions V3, 4 and 5 concerning for ischemia  Q Waves: none  Comparison to prior: New ST depressions compared to prior    Clinical Impression: ST  depressions in V34 and 5 concerning for right ventricular or posterior infarction             No results found for any visits on 12/31/24.  Medications   ondansetron (ZOFRAN) injection 4 mg (has no administration in time range)   oxyCODONE (ROXICODONE) tablet 5 mg (has no administration in time range)     Labs Ordered and Resulted from Time of ED Arrival to Time of ED Departure - No data to display  No orders to display          Critical Care Addendum  My initial assessment, based on my review of vital signs, focused history, physical exam, and 12 lead ECG analysis, established a high suspicion that Erwin Aguilar has cardiac arrest, which requires immediate intervention, and therefore he is critically ill.     After the initial assessment, the care team initiated multiple lab tests, initiated medication therapy with aspirin, heparin, etomidate, succinylcholine, and performed advanced airway management to provide stabilization care. Due to the critical nature of this patient, I reassessed physical exam, 12 lead ECG analysis, and respiratory status multiple times prior to his disposition.     Time also spent performing documentation, reviewing test results, discussion with consultants, and coordination of care.     Critical care time (excluding teaching time and procedures): 45 minutes.       Assessment & Plan    Patient resenting with chronic left arm pain.  He mentioned concerns about his heart.  EKG was delayed however when this was done the patient had concerning signs of ischemia.  Shortly after the patient went into cardiac arrest.  1 round of CPR and epinephrine was done and the patient was found to be in ventricular fibrillation.  He did regain pulse after that going into atrial fibrillation with RVR.  Patient is intubated.  Cath Lab was activated.  Patient's blood pressure was elevated, he had good oxygenation but tachycardic prior to getting sent to Cath Lab.    I have reviewed the nursing notes. I have  reviewed the findings, diagnosis, plan and need for follow up with the patient.    New Prescriptions    No medications on file       Final diagnoses:   ST elevation MI (STEMI) (H)       Antonio Kelley MD   [facility] EMERGENCY DEPARTMENT  12/31/2024     Antonio Kelley MD  12/31/24 0912

## 2024-12-31 NOTE — ED TRIAGE NOTES
BIBA with N/V/D and left arm pain. Report the left arm pain is chronic. N/V/D has been ongoing for a few days.     Triage Assessment (Adult)       Row Name 12/31/24 0728          Triage Assessment    Airway WDL WDL        Respiratory WDL    Respiratory WDL WDL        Skin Circulation/Temperature WDL    Skin Circulation/Temperature WDL WDL        Cardiac WDL    Cardiac WDL WDL        Peripheral/Neurovascular WDL    Peripheral Neurovascular WDL WDL        Cognitive/Neuro/Behavioral WDL    Cognitive/Neuro/Behavioral WDL WDL

## 2025-01-01 ENCOUNTER — APPOINTMENT (OUTPATIENT)
Dept: CARDIOLOGY | Facility: CLINIC | Age: 65
DRG: 003 | End: 2025-01-01
Attending: NURSE PRACTITIONER
Payer: MEDICARE

## 2025-01-01 ENCOUNTER — APPOINTMENT (OUTPATIENT)
Dept: GENERAL RADIOLOGY | Facility: CLINIC | Age: 65
DRG: 003 | End: 2025-01-01
Attending: NURSE PRACTITIONER
Payer: MEDICARE

## 2025-01-01 ENCOUNTER — APPOINTMENT (OUTPATIENT)
Dept: NEUROLOGY | Facility: CLINIC | Age: 65
DRG: 003 | End: 2025-01-01
Attending: NURSE PRACTITIONER
Payer: MEDICARE

## 2025-01-01 LAB
ACT BLD: 174 SECONDS (ref 74–150)
ACT BLD: 178 SECONDS (ref 74–150)
ACT BLD: 182 SECONDS (ref 74–150)
ACT BLD: 186 SECONDS (ref 74–150)
ACT BLD: 186 SECONDS (ref 74–150)
ACT BLD: 190 SECONDS (ref 74–150)
ALBUMIN SERPL BCG-MCNC: 3 G/DL (ref 3.5–5.2)
ALBUMIN SERPL BCG-MCNC: 3.1 G/DL (ref 3.5–5.2)
ALBUMIN SERPL BCG-MCNC: 3.1 G/DL (ref 3.5–5.2)
ALBUMIN SERPL BCG-MCNC: 3.3 G/DL (ref 3.5–5.2)
ALLEN'S TEST: ABNORMAL
ALP SERPL-CCNC: 84 U/L (ref 40–150)
ALP SERPL-CCNC: 88 U/L (ref 40–150)
ALP SERPL-CCNC: 92 U/L (ref 40–150)
ALP SERPL-CCNC: 98 U/L (ref 40–150)
ALT SERPL W P-5'-P-CCNC: 19 U/L (ref 0–70)
ALT SERPL W P-5'-P-CCNC: 21 U/L (ref 0–70)
ALT SERPL W P-5'-P-CCNC: 23 U/L (ref 0–70)
ALT SERPL W P-5'-P-CCNC: 24 U/L (ref 0–70)
ANION GAP SERPL CALCULATED.3IONS-SCNC: 11 MMOL/L (ref 7–15)
ANION GAP SERPL CALCULATED.3IONS-SCNC: 9 MMOL/L (ref 7–15)
APTT PPP: 119 SECONDS (ref 22–38)
APTT PPP: 156 SECONDS (ref 22–38)
APTT PPP: 179 SECONDS (ref 22–38)
APTT PPP: 200 SECONDS (ref 22–38)
AST SERPL W P-5'-P-CCNC: 104 U/L (ref 0–45)
AST SERPL W P-5'-P-CCNC: 123 U/L (ref 0–45)
AST SERPL W P-5'-P-CCNC: 162 U/L (ref 0–45)
AST SERPL W P-5'-P-CCNC: 84 U/L (ref 0–45)
AT III ACT/NOR PPP CHRO: 70 % (ref 85–135)
ATRIAL RATE - MUSE: 61 BPM
ATRIAL RATE - MUSE: 82 BPM
BASE EXCESS BLDA CALC-SCNC: -0.1 MMOL/L (ref -3–3)
BASE EXCESS BLDA CALC-SCNC: -0.3 MMOL/L (ref -3–3)
BASE EXCESS BLDA CALC-SCNC: -0.7 MMOL/L (ref -3–3)
BASE EXCESS BLDA CALC-SCNC: -1 MMOL/L (ref -3–3)
BASE EXCESS BLDA CALC-SCNC: -1.3 MMOL/L (ref -3–3)
BASE EXCESS BLDA CALC-SCNC: -1.4 MMOL/L (ref -3–3)
BASE EXCESS BLDA CALC-SCNC: -1.5 MMOL/L (ref -3–3)
BASE EXCESS BLDA CALC-SCNC: -1.5 MMOL/L (ref -3–3)
BASE EXCESS BLDA CALC-SCNC: -2.4 MMOL/L (ref -3–3)
BASE EXCESS BLDA CALC-SCNC: -2.6 MMOL/L (ref -3–3)
BASE EXCESS BLDA CALC-SCNC: -2.9 MMOL/L (ref -3–3)
BASE EXCESS BLDA CALC-SCNC: -3 MMOL/L (ref -3–3)
BASE EXCESS BLDA CALC-SCNC: -3 MMOL/L (ref -3–3)
BASE EXCESS BLDA CALC-SCNC: -3.5 MMOL/L (ref -3–3)
BASE EXCESS BLDV CALC-SCNC: -0.7 MMOL/L (ref -3–3)
BASE EXCESS BLDV CALC-SCNC: -2.7 MMOL/L (ref -3–3)
BILIRUB SERPL-MCNC: 0.3 MG/DL
BILIRUB SERPL-MCNC: 0.5 MG/DL
BUN SERPL-MCNC: 5.1 MG/DL (ref 8–23)
BUN SERPL-MCNC: 5.4 MG/DL (ref 8–23)
BUN SERPL-MCNC: 5.8 MG/DL (ref 8–23)
BUN SERPL-MCNC: 7.3 MG/DL (ref 8–23)
CA-I BLD-MCNC: 4.5 MG/DL (ref 4.4–5.2)
CA-I BLD-MCNC: 4.6 MG/DL (ref 4.4–5.2)
CA-I BLD-MCNC: 4.7 MG/DL (ref 4.4–5.2)
CA-I BLD-MCNC: 4.7 MG/DL (ref 4.4–5.2)
CALCIUM SERPL-MCNC: 8.1 MG/DL (ref 8.8–10.4)
CALCIUM SERPL-MCNC: 8.2 MG/DL (ref 8.8–10.4)
CF REDUC 30M P MA P HEP LENFR BLD TEG: 0 % (ref 0–8)
CF REDUC 60M P MA P HEPASE LENFR BLD TEG: 0 % (ref 0–15)
CFT P HPASE BLD TEG: 1.1 MINUTE (ref 1–3)
CHLORIDE SERPL-SCNC: 109 MMOL/L (ref 98–107)
CHLORIDE SERPL-SCNC: 110 MMOL/L (ref 98–107)
CI (COAGULATION INDEX)(Z): 0.1 (ref -3–3)
CLOT ANGLE P HPASE BLD TEG: 72.7 DEGREES (ref 53–72)
CLOT INIT P HPASE BLD TEG: 7.1 MINUTE (ref 5–10)
CLOT STRENGTH P HPASE BLD TEG: 7.6 KD/SC (ref 4.5–11)
CREAT SERPL-MCNC: 0.62 MG/DL (ref 0.67–1.17)
CREAT SERPL-MCNC: 0.66 MG/DL (ref 0.67–1.17)
CREAT SERPL-MCNC: 0.7 MG/DL (ref 0.67–1.17)
CREAT SERPL-MCNC: 0.7 MG/DL (ref 0.67–1.17)
CRP SERPL-MCNC: 47 MG/L
D DIMER PPP FEU-MCNC: 1.24 UG/ML FEU (ref 0–0.5)
D DIMER PPP FEU-MCNC: 2.13 UG/ML FEU (ref 0–0.5)
DIASTOLIC BLOOD PRESSURE - MUSE: NORMAL MMHG
DIASTOLIC BLOOD PRESSURE - MUSE: NORMAL MMHG
EGFRCR SERPLBLD CKD-EPI 2021: >90 ML/MIN/1.73M2
ERYTHROCYTE [DISTWIDTH] IN BLOOD BY AUTOMATED COUNT: 12.9 % (ref 10–15)
ERYTHROCYTE [DISTWIDTH] IN BLOOD BY AUTOMATED COUNT: 13 % (ref 10–15)
ERYTHROCYTE [DISTWIDTH] IN BLOOD BY AUTOMATED COUNT: 13.2 % (ref 10–15)
ERYTHROCYTE [DISTWIDTH] IN BLOOD BY AUTOMATED COUNT: 13.2 % (ref 10–15)
ERYTHROCYTE [SEDIMENTATION RATE] IN BLOOD BY WESTERGREN METHOD: 20 MM/HR (ref 0–20)
FIBRINOGEN PPP-MCNC: 387 MG/DL (ref 170–510)
FIBRINOGEN PPP-MCNC: 413 MG/DL (ref 170–510)
GLUCOSE BLD-MCNC: 107 MG/DL (ref 70–99)
GLUCOSE BLD-MCNC: 110 MG/DL (ref 70–99)
GLUCOSE BLD-MCNC: 145 MG/DL (ref 70–99)
GLUCOSE BLD-MCNC: 99 MG/DL (ref 70–99)
GLUCOSE BLDC GLUCOMTR-MCNC: 109 MG/DL (ref 70–99)
GLUCOSE BLDC GLUCOMTR-MCNC: 109 MG/DL (ref 70–99)
GLUCOSE BLDC GLUCOMTR-MCNC: 110 MG/DL (ref 70–99)
GLUCOSE BLDC GLUCOMTR-MCNC: 112 MG/DL (ref 70–99)
GLUCOSE BLDC GLUCOMTR-MCNC: 126 MG/DL (ref 70–99)
GLUCOSE BLDC GLUCOMTR-MCNC: 149 MG/DL (ref 70–99)
GLUCOSE BLDC GLUCOMTR-MCNC: 164 MG/DL (ref 70–99)
GLUCOSE BLDC GLUCOMTR-MCNC: 95 MG/DL (ref 70–99)
GLUCOSE BLDC GLUCOMTR-MCNC: 95 MG/DL (ref 70–99)
GLUCOSE BLDC GLUCOMTR-MCNC: 98 MG/DL (ref 70–99)
GLUCOSE SERPL-MCNC: 108 MG/DL (ref 70–99)
GLUCOSE SERPL-MCNC: 116 MG/DL (ref 70–99)
GLUCOSE SERPL-MCNC: 149 MG/DL (ref 70–99)
GLUCOSE SERPL-MCNC: 99 MG/DL (ref 70–99)
HCO3 BLD-SCNC: 22 MMOL/L (ref 21–28)
HCO3 BLD-SCNC: 22 MMOL/L (ref 21–28)
HCO3 BLD-SCNC: 23 MMOL/L (ref 21–28)
HCO3 BLD-SCNC: 24 MMOL/L (ref 21–28)
HCO3 BLDA-SCNC: 24 MMOL/L (ref 21–28)
HCO3 BLDA-SCNC: 25 MMOL/L (ref 21–28)
HCO3 BLDV-SCNC: 24 MMOL/L (ref 21–28)
HCO3 BLDV-SCNC: 26 MMOL/L (ref 21–28)
HCO3 SERPL-SCNC: 20 MMOL/L (ref 22–29)
HCO3 SERPL-SCNC: 21 MMOL/L (ref 22–29)
HCT VFR BLD AUTO: 28.5 % (ref 40–53)
HCT VFR BLD AUTO: 29 % (ref 40–53)
HCT VFR BLD AUTO: 30.1 % (ref 40–53)
HCT VFR BLD AUTO: 32.2 % (ref 40–53)
HGB BLD-MCNC: 10.5 G/DL (ref 13.3–17.7)
HGB BLD-MCNC: 10.7 G/DL (ref 13.3–17.7)
HGB BLD-MCNC: 9.5 G/DL (ref 13.3–17.7)
HGB BLD-MCNC: 9.6 G/DL (ref 13.3–17.7)
HGB FREE PLAS-MCNC: 50 MG/DL
INR PPP: 1.19 (ref 0.85–1.15)
INR PPP: 1.2 (ref 0.85–1.15)
INR PPP: 1.22 (ref 0.85–1.15)
INR PPP: 1.24 (ref 0.85–1.15)
INTERPRETATION ECG - MUSE: NORMAL
INTERPRETATION ECG - MUSE: NORMAL
LACTATE SERPL-SCNC: 0.4 MMOL/L (ref 0.7–2)
LACTATE SERPL-SCNC: 0.5 MMOL/L (ref 0.7–2)
LACTATE SERPL-SCNC: 0.5 MMOL/L (ref 0.7–2)
LACTATE SERPL-SCNC: 0.6 MMOL/L (ref 0.7–2)
LACTATE SERPL-SCNC: 0.7 MMOL/L (ref 0.7–2)
LDH SERPL L TO P-CCNC: 591 U/L (ref 0–250)
LVEF ECHO: NORMAL
MAGNESIUM SERPL-MCNC: 1.9 MG/DL (ref 1.7–2.3)
MAGNESIUM SERPL-MCNC: 2 MG/DL (ref 1.7–2.3)
MAGNESIUM SERPL-MCNC: 2 MG/DL (ref 1.7–2.3)
MAGNESIUM SERPL-MCNC: 2.1 MG/DL (ref 1.7–2.3)
MCF P HPASE BLD TEG: 60.4 MM (ref 50–70)
MCH RBC QN AUTO: 29.4 PG (ref 26.5–33)
MCH RBC QN AUTO: 29.5 PG (ref 26.5–33)
MCH RBC QN AUTO: 29.5 PG (ref 26.5–33)
MCH RBC QN AUTO: 30.1 PG (ref 26.5–33)
MCHC RBC AUTO-ENTMCNC: 33.1 G/DL (ref 31.5–36.5)
MCHC RBC AUTO-ENTMCNC: 33.2 G/DL (ref 31.5–36.5)
MCHC RBC AUTO-ENTMCNC: 33.3 G/DL (ref 31.5–36.5)
MCHC RBC AUTO-ENTMCNC: 34.9 G/DL (ref 31.5–36.5)
MCV RBC AUTO: 86 FL (ref 78–100)
MCV RBC AUTO: 89 FL (ref 78–100)
O2/TOTAL GAS SETTING VFR VENT: 40 %
O2/TOTAL GAS SETTING VFR VENT: 50 %
O2/TOTAL GAS SETTING VFR VENT: 50 %
O2/TOTAL GAS SETTING VFR VENT: 60 %
O2/TOTAL GAS SETTING VFR VENT: 70 %
OXYHGB MFR BLDA: 88 % (ref 92–100)
OXYHGB MFR BLDA: 95 % (ref 92–100)
OXYHGB MFR BLDA: 95 % (ref 92–100)
OXYHGB MFR BLDA: 96 % (ref 92–100)
OXYHGB MFR BLDA: 97 % (ref 92–100)
OXYHGB MFR BLDA: 98 % (ref 75–100)
OXYHGB MFR BLDA: 98 % (ref 92–100)
OXYHGB MFR BLDA: 99 % (ref 75–100)
OXYHGB MFR BLDA: 99 % (ref 92–100)
OXYHGB MFR BLDA: 99 % (ref 92–100)
OXYHGB MFR BLDV: 80 %
OXYHGB MFR BLDV: 86 %
P AXIS - MUSE: 12 DEGREES
P AXIS - MUSE: 37 DEGREES
PCO2 BLD: 34 MM HG (ref 35–45)
PCO2 BLD: 37 MM HG (ref 35–45)
PCO2 BLD: 37 MM HG (ref 35–45)
PCO2 BLD: 38 MM HG (ref 35–45)
PCO2 BLD: 39 MM HG (ref 35–45)
PCO2 BLD: 40 MM HG (ref 35–45)
PCO2 BLD: 42 MM HG (ref 35–45)
PCO2 BLD: 42 MM HG (ref 35–45)
PCO2 BLDA: 48 MM HG (ref 35–45)
PCO2 BLDA: 50 MM HG (ref 35–45)
PCO2 BLDV: 50 MM HG (ref 40–50)
PCO2 BLDV: 50 MM HG (ref 40–50)
PEEP: 8 CM H2O
PH BLD: 7.34 [PH] (ref 7.35–7.45)
PH BLD: 7.34 [PH] (ref 7.35–7.45)
PH BLD: 7.36 [PH] (ref 7.35–7.45)
PH BLD: 7.36 [PH] (ref 7.35–7.45)
PH BLD: 7.37 [PH] (ref 7.35–7.45)
PH BLD: 7.39 [PH] (ref 7.35–7.45)
PH BLD: 7.39 [PH] (ref 7.35–7.45)
PH BLD: 7.4 [PH] (ref 7.35–7.45)
PH BLD: 7.41 [PH] (ref 7.35–7.45)
PH BLD: 7.41 [PH] (ref 7.35–7.45)
PH BLD: 7.42 [PH] (ref 7.35–7.45)
PH BLD: 7.45 [PH] (ref 7.35–7.45)
PH BLDA: 7.29 [PH] (ref 7.35–7.45)
PH BLDA: 7.32 [PH] (ref 7.35–7.45)
PH BLDV: 7.29 [PH] (ref 7.32–7.43)
PH BLDV: 7.32 [PH] (ref 7.32–7.43)
PHOSPHATE SERPL-MCNC: 2.4 MG/DL (ref 2.5–4.5)
PHOSPHATE SERPL-MCNC: 3.2 MG/DL (ref 2.5–4.5)
PLATELET # BLD AUTO: 104 10E3/UL (ref 150–450)
PLATELET # BLD AUTO: 109 10E3/UL (ref 150–450)
PLATELET # BLD AUTO: 109 10E3/UL (ref 150–450)
PLATELET # BLD AUTO: 111 10E3/UL (ref 150–450)
PO2 BLD: 101 MM HG (ref 80–105)
PO2 BLD: 101 MM HG (ref 80–105)
PO2 BLD: 104 MM HG (ref 80–105)
PO2 BLD: 114 MM HG (ref 80–105)
PO2 BLD: 119 MM HG (ref 80–105)
PO2 BLD: 134 MM HG (ref 80–105)
PO2 BLD: 182 MM HG (ref 80–105)
PO2 BLD: 59 MM HG (ref 80–105)
PO2 BLD: 76 MM HG (ref 80–105)
PO2 BLD: 77 MM HG (ref 80–105)
PO2 BLD: 84 MM HG (ref 80–105)
PO2 BLD: 90 MM HG (ref 80–105)
PO2 BLDA: 181 MM HG (ref 80–105)
PO2 BLDA: 243 MM HG (ref 80–105)
PO2 BLDV: 47 MM HG (ref 25–47)
PO2 BLDV: 56 MM HG (ref 25–47)
POTASSIUM SERPL-SCNC: 3.4 MMOL/L (ref 3.4–5.3)
POTASSIUM SERPL-SCNC: 3.4 MMOL/L (ref 3.4–5.3)
POTASSIUM SERPL-SCNC: 3.7 MMOL/L (ref 3.4–5.3)
POTASSIUM SERPL-SCNC: 3.8 MMOL/L (ref 3.4–5.3)
POTASSIUM SERPL-SCNC: 4 MMOL/L (ref 3.4–5.3)
PR INTERVAL - MUSE: 152 MS
PR INTERVAL - MUSE: 170 MS
PROCALCITONIN SERPL IA-MCNC: 0.19 NG/ML
PROT SERPL-MCNC: 5.3 G/DL (ref 6.4–8.3)
PROT SERPL-MCNC: 5.4 G/DL (ref 6.4–8.3)
PROT SERPL-MCNC: 5.4 G/DL (ref 6.4–8.3)
PROT SERPL-MCNC: 5.6 G/DL (ref 6.4–8.3)
QRS DURATION - MUSE: 84 MS
QRS DURATION - MUSE: 86 MS
QT - MUSE: 488 MS
QT - MUSE: 536 MS
QTC - MUSE: 491 MS
QTC - MUSE: 626 MS
R AXIS - MUSE: -23 DEGREES
R AXIS - MUSE: 4 DEGREES
RBC # BLD AUTO: 3.22 10E6/UL (ref 4.4–5.9)
RBC # BLD AUTO: 3.26 10E6/UL (ref 4.4–5.9)
RBC # BLD AUTO: 3.49 10E6/UL (ref 4.4–5.9)
RBC # BLD AUTO: 3.63 10E6/UL (ref 4.4–5.9)
SAO2 % BLDA: 100 % (ref 96–97)
SAO2 % BLDA: 90.5 % (ref 96–97)
SAO2 % BLDA: 96.4 % (ref 96–97)
SAO2 % BLDA: 96.9 % (ref 96–97)
SAO2 % BLDA: 97.7 % (ref 96–97)
SAO2 % BLDA: 98.4 % (ref 96–97)
SAO2 % BLDA: 98.5 % (ref 96–97)
SAO2 % BLDA: 98.8 % (ref 96–97)
SAO2 % BLDA: 99 % (ref 96–97)
SAO2 % BLDA: 99.1 % (ref 96–97)
SAO2 % BLDA: >100 % (ref 96–97)
SAO2 % BLDV: 81.9 % (ref 70–75)
SAO2 % BLDV: 87.6 % (ref 70–75)
SODIUM SERPL-SCNC: 140 MMOL/L (ref 135–145)
SODIUM SERPL-SCNC: 140 MMOL/L (ref 135–145)
SODIUM SERPL-SCNC: 141 MMOL/L (ref 135–145)
SODIUM SERPL-SCNC: 141 MMOL/L (ref 135–145)
SYSTOLIC BLOOD PRESSURE - MUSE: NORMAL MMHG
SYSTOLIC BLOOD PRESSURE - MUSE: NORMAL MMHG
T AXIS - MUSE: -58 DEGREES
T AXIS - MUSE: -82 DEGREES
T3 SERPL-MCNC: 74 NG/DL (ref 85–202)
T3FREE SERPL-MCNC: 2.1 PG/ML (ref 2–4.4)
T4 FREE SERPL-MCNC: 1.32 NG/DL (ref 0.9–1.7)
TROPONIN T SERPL HS-MCNC: 2277 NG/L
TROPONIN T SERPL HS-MCNC: 2820 NG/L
TROPONIN T SERPL HS-MCNC: 2996 NG/L
TROPONIN T SERPL HS-MCNC: 4252 NG/L
TSH SERPL DL<=0.005 MIU/L-ACNC: 0.49 UIU/ML (ref 0.3–4.2)
UFH PPP CHRO-ACNC: 0.51 IU/ML
UFH PPP CHRO-ACNC: 0.67 IU/ML
UFH PPP CHRO-ACNC: 0.7 IU/ML
UFH PPP CHRO-ACNC: 0.76 IU/ML
VENTRICULAR RATE- MUSE: 61 BPM
VENTRICULAR RATE- MUSE: 82 BPM
WBC # BLD AUTO: 5.3 10E3/UL (ref 4–11)
WBC # BLD AUTO: 6 10E3/UL (ref 4–11)
WBC # BLD AUTO: 6.9 10E3/UL (ref 4–11)
WBC # BLD AUTO: 7.3 10E3/UL (ref 4–11)

## 2025-01-01 PROCEDURE — 82805 BLOOD GASES W/O2 SATURATION: CPT | Performed by: STUDENT IN AN ORGANIZED HEALTH CARE EDUCATION/TRAINING PROGRAM

## 2025-01-01 PROCEDURE — 84484 ASSAY OF TROPONIN QUANT: CPT | Performed by: NURSE PRACTITIONER

## 2025-01-01 PROCEDURE — 250N000011 HC RX IP 250 OP 636: Performed by: STUDENT IN AN ORGANIZED HEALTH CARE EDUCATION/TRAINING PROGRAM

## 2025-01-01 PROCEDURE — 85384 FIBRINOGEN ACTIVITY: CPT | Performed by: STUDENT IN AN ORGANIZED HEALTH CARE EDUCATION/TRAINING PROGRAM

## 2025-01-01 PROCEDURE — 250N000011 HC RX IP 250 OP 636: Performed by: NURSE PRACTITIONER

## 2025-01-01 PROCEDURE — 85396 CLOTTING ASSAY WHOLE BLOOD: CPT | Performed by: NURSE PRACTITIONER

## 2025-01-01 PROCEDURE — 87070 CULTURE OTHR SPECIMN AEROBIC: CPT | Performed by: NURSE PRACTITIONER

## 2025-01-01 PROCEDURE — 200N000002 HC R&B ICU UMMC

## 2025-01-01 PROCEDURE — 83615 LACTATE (LD) (LDH) ENZYME: CPT | Performed by: NURSE PRACTITIONER

## 2025-01-01 PROCEDURE — 258N000003 HC RX IP 258 OP 636: Performed by: STUDENT IN AN ORGANIZED HEALTH CARE EDUCATION/TRAINING PROGRAM

## 2025-01-01 PROCEDURE — 85379 FIBRIN DEGRADATION QUANT: CPT | Performed by: NURSE PRACTITIONER

## 2025-01-01 PROCEDURE — 85014 HEMATOCRIT: CPT | Performed by: NURSE PRACTITIONER

## 2025-01-01 PROCEDURE — 84100 ASSAY OF PHOSPHORUS: CPT | Performed by: NURSE PRACTITIONER

## 2025-01-01 PROCEDURE — 85520 HEPARIN ASSAY: CPT | Performed by: NURSE PRACTITIONER

## 2025-01-01 PROCEDURE — 250N000013 HC RX MED GY IP 250 OP 250 PS 637: Performed by: NURSE PRACTITIONER

## 2025-01-01 PROCEDURE — 82805 BLOOD GASES W/O2 SATURATION: CPT | Performed by: NURSE PRACTITIONER

## 2025-01-01 PROCEDURE — 250N000013 HC RX MED GY IP 250 OP 250 PS 637: Performed by: STUDENT IN AN ORGANIZED HEALTH CARE EDUCATION/TRAINING PROGRAM

## 2025-01-01 PROCEDURE — 82947 ASSAY GLUCOSE BLOOD QUANT: CPT | Performed by: STUDENT IN AN ORGANIZED HEALTH CARE EDUCATION/TRAINING PROGRAM

## 2025-01-01 PROCEDURE — 93306 TTE W/DOPPLER COMPLETE: CPT

## 2025-01-01 PROCEDURE — 85048 AUTOMATED LEUKOCYTE COUNT: CPT | Performed by: NURSE PRACTITIONER

## 2025-01-01 PROCEDURE — 84075 ASSAY ALKALINE PHOSPHATASE: CPT | Performed by: NURSE PRACTITIONER

## 2025-01-01 PROCEDURE — 93005 ELECTROCARDIOGRAM TRACING: CPT

## 2025-01-01 PROCEDURE — 999N000157 HC STATISTIC RCP TIME EA 10 MIN

## 2025-01-01 PROCEDURE — 95718 EEG PHYS/QHP 2-12 HR W/VEEG: CPT | Performed by: PSYCHIATRY & NEUROLOGY

## 2025-01-01 PROCEDURE — 86316 IMMUNOASSAY TUMOR OTHER: CPT | Performed by: NURSE PRACTITIONER

## 2025-01-01 PROCEDURE — 87205 SMEAR GRAM STAIN: CPT | Performed by: NURSE PRACTITIONER

## 2025-01-01 PROCEDURE — 84132 ASSAY OF SERUM POTASSIUM: CPT | Performed by: STUDENT IN AN ORGANIZED HEALTH CARE EDUCATION/TRAINING PROGRAM

## 2025-01-01 PROCEDURE — 84481 FREE ASSAY (FT-3): CPT | Performed by: NURSE PRACTITIONER

## 2025-01-01 PROCEDURE — 85652 RBC SED RATE AUTOMATED: CPT | Performed by: NURSE PRACTITIONER

## 2025-01-01 PROCEDURE — 250N000009 HC RX 250: Performed by: STUDENT IN AN ORGANIZED HEALTH CARE EDUCATION/TRAINING PROGRAM

## 2025-01-01 PROCEDURE — 86140 C-REACTIVE PROTEIN: CPT | Performed by: NURSE PRACTITIONER

## 2025-01-01 PROCEDURE — 84480 ASSAY TRIIODOTHYRONINE (T3): CPT | Performed by: NURSE PRACTITIONER

## 2025-01-01 PROCEDURE — 85347 COAGULATION TIME ACTIVATED: CPT

## 2025-01-01 PROCEDURE — 83051 HEMOGLOBIN PLASMA: CPT | Performed by: NURSE PRACTITIONER

## 2025-01-01 PROCEDURE — 95711 VEEG 2-12 HR UNMONITORED: CPT

## 2025-01-01 PROCEDURE — 83605 ASSAY OF LACTIC ACID: CPT | Performed by: NURSE PRACTITIONER

## 2025-01-01 PROCEDURE — 93306 TTE W/DOPPLER COMPLETE: CPT | Mod: 26 | Performed by: INTERNAL MEDICINE

## 2025-01-01 PROCEDURE — 33949 ECMO/ECLS DAILY MGMT ARTERY: CPT | Performed by: STUDENT IN AN ORGANIZED HEALTH CARE EDUCATION/TRAINING PROGRAM

## 2025-01-01 PROCEDURE — 33949 ECMO/ECLS DAILY MGMT ARTERY: CPT

## 2025-01-01 PROCEDURE — 85300 ANTITHROMBIN III ACTIVITY: CPT | Performed by: NURSE PRACTITIONER

## 2025-01-01 PROCEDURE — 80053 COMPREHEN METABOLIC PANEL: CPT | Performed by: STUDENT IN AN ORGANIZED HEALTH CARE EDUCATION/TRAINING PROGRAM

## 2025-01-01 PROCEDURE — 84443 ASSAY THYROID STIM HORMONE: CPT | Performed by: NURSE PRACTITIONER

## 2025-01-01 PROCEDURE — 250N000009 HC RX 250: Performed by: NURSE PRACTITIONER

## 2025-01-01 PROCEDURE — 84100 ASSAY OF PHOSPHORUS: CPT | Performed by: STUDENT IN AN ORGANIZED HEALTH CARE EDUCATION/TRAINING PROGRAM

## 2025-01-01 PROCEDURE — 99291 CRITICAL CARE FIRST HOUR: CPT | Mod: 25 | Performed by: NURSE PRACTITIONER

## 2025-01-01 PROCEDURE — 71045 X-RAY EXAM CHEST 1 VIEW: CPT

## 2025-01-01 PROCEDURE — 84132 ASSAY OF SERUM POTASSIUM: CPT | Performed by: NURSE PRACTITIONER

## 2025-01-01 PROCEDURE — 82947 ASSAY GLUCOSE BLOOD QUANT: CPT | Performed by: NURSE PRACTITIONER

## 2025-01-01 PROCEDURE — 84439 ASSAY OF FREE THYROXINE: CPT | Performed by: NURSE PRACTITIONER

## 2025-01-01 PROCEDURE — 83735 ASSAY OF MAGNESIUM: CPT | Performed by: NURSE PRACTITIONER

## 2025-01-01 PROCEDURE — 82330 ASSAY OF CALCIUM: CPT | Performed by: NURSE PRACTITIONER

## 2025-01-01 PROCEDURE — 85610 PROTHROMBIN TIME: CPT | Performed by: NURSE PRACTITIONER

## 2025-01-01 PROCEDURE — 84145 PROCALCITONIN (PCT): CPT | Performed by: NURSE PRACTITIONER

## 2025-01-01 PROCEDURE — 85730 THROMBOPLASTIN TIME PARTIAL: CPT | Performed by: NURSE PRACTITIONER

## 2025-01-01 PROCEDURE — 71045 X-RAY EXAM CHEST 1 VIEW: CPT | Mod: 26 | Performed by: STUDENT IN AN ORGANIZED HEALTH CARE EDUCATION/TRAINING PROGRAM

## 2025-01-01 PROCEDURE — 94003 VENT MGMT INPAT SUBQ DAY: CPT

## 2025-01-01 RX ORDER — ASPIRIN 81 MG/1
81 TABLET, CHEWABLE ORAL DAILY
Status: DISCONTINUED | OUTPATIENT
Start: 2025-01-01 | End: 2025-01-02

## 2025-01-01 RX ORDER — POTASSIUM CHLORIDE 29.8 MG/ML
20 INJECTION INTRAVENOUS ONCE
Status: COMPLETED | OUTPATIENT
Start: 2025-01-01 | End: 2025-01-02

## 2025-01-01 RX ORDER — POTASSIUM CHLORIDE 29.8 MG/ML
20 INJECTION INTRAVENOUS
Status: COMPLETED | OUTPATIENT
Start: 2025-01-01 | End: 2025-01-01

## 2025-01-01 RX ORDER — MAGNESIUM OXIDE 400 MG/1
400 TABLET ORAL EVERY 4 HOURS
Status: COMPLETED | OUTPATIENT
Start: 2025-01-01 | End: 2025-01-01

## 2025-01-01 RX ORDER — MAGNESIUM SULFATE HEPTAHYDRATE 40 MG/ML
2 INJECTION, SOLUTION INTRAVENOUS ONCE
Status: COMPLETED | OUTPATIENT
Start: 2025-01-01 | End: 2025-01-01

## 2025-01-01 RX ORDER — POTASSIUM CHLORIDE 29.8 MG/ML
20 INJECTION INTRAVENOUS ONCE
Status: COMPLETED | OUTPATIENT
Start: 2025-01-01 | End: 2025-01-01

## 2025-01-01 RX ADMIN — CHLORHEXIDINE GLUCONATE 15 ML: 1.2 SOLUTION ORAL at 08:25

## 2025-01-01 RX ADMIN — MAGNESIUM SULFATE HEPTAHYDRATE 2 G: 2 INJECTION, SOLUTION INTRAVENOUS at 22:57

## 2025-01-01 RX ADMIN — BACLOFEN 20 MG: 20 TABLET ORAL at 19:36

## 2025-01-01 RX ADMIN — PROPOFOL 50 MCG/KG/MIN: 10 INJECTION, EMULSION INTRAVENOUS at 11:36

## 2025-01-01 RX ADMIN — HEPARIN SODIUM 1800 UNITS/HR: 10000 INJECTION, SOLUTION INTRAVENOUS at 22:07

## 2025-01-01 RX ADMIN — Medication 200 MCG/HR: at 19:56

## 2025-01-01 RX ADMIN — CEFTRIAXONE SODIUM 2 G: 2 INJECTION, POWDER, FOR SOLUTION INTRAMUSCULAR; INTRAVENOUS at 14:06

## 2025-01-01 RX ADMIN — METHOCARBAMOL TABLETS 500 MG: 500 TABLET, COATED ORAL at 08:25

## 2025-01-01 RX ADMIN — Medication 10 MG: at 16:26

## 2025-01-01 RX ADMIN — PROPOFOL 60 MCG/KG/MIN: 10 INJECTION, EMULSION INTRAVENOUS at 04:27

## 2025-01-01 RX ADMIN — METHOCARBAMOL TABLETS 500 MG: 500 TABLET, COATED ORAL at 11:52

## 2025-01-01 RX ADMIN — METHOCARBAMOL TABLETS 500 MG: 500 TABLET, COATED ORAL at 16:02

## 2025-01-01 RX ADMIN — BACLOFEN 20 MG: 20 TABLET ORAL at 08:25

## 2025-01-01 RX ADMIN — Medication 200 MCG/HR: at 07:53

## 2025-01-01 RX ADMIN — CHLORHEXIDINE GLUCONATE 15 ML: 1.2 SOLUTION ORAL at 19:35

## 2025-01-01 RX ADMIN — PANTOPRAZOLE SODIUM 40 MG: 40 INJECTION, POWDER, FOR SOLUTION INTRAVENOUS at 08:25

## 2025-01-01 RX ADMIN — POLYETHYLENE GLYCOL 3350 17 G: 17 POWDER, FOR SOLUTION ORAL at 08:25

## 2025-01-01 RX ADMIN — PROPOFOL 50 MCG/KG/MIN: 10 INJECTION, EMULSION INTRAVENOUS at 22:52

## 2025-01-01 RX ADMIN — Medication 400 MG: at 17:45

## 2025-01-01 RX ADMIN — POTASSIUM CHLORIDE 20 MEQ: 29.8 INJECTION INTRAVENOUS at 13:09

## 2025-01-01 RX ADMIN — METHOCARBAMOL TABLETS 500 MG: 500 TABLET, COATED ORAL at 19:36

## 2025-01-01 RX ADMIN — PROPOFOL 50 MCG/KG/MIN: 10 INJECTION, EMULSION INTRAVENOUS at 16:02

## 2025-01-01 RX ADMIN — POTASSIUM CHLORIDE 20 MEQ: 29.8 INJECTION INTRAVENOUS at 05:43

## 2025-01-01 RX ADMIN — PROPOFOL 55 MCG/KG/MIN: 10 INJECTION, EMULSION INTRAVENOUS at 00:22

## 2025-01-01 RX ADMIN — BACLOFEN 20 MG: 20 TABLET ORAL at 14:06

## 2025-01-01 RX ADMIN — MIDAZOLAM IN SODIUM CHLORIDE 8 MG/HR: 1 INJECTION INTRAVENOUS at 07:26

## 2025-01-01 RX ADMIN — PROPOFOL 60 MCG/KG/MIN: 10 INJECTION, EMULSION INTRAVENOUS at 04:08

## 2025-01-01 RX ADMIN — PROPOFOL 60 MCG/KG/MIN: 10 INJECTION, EMULSION INTRAVENOUS at 07:26

## 2025-01-01 RX ADMIN — ASPIRIN 81 MG CHEWABLE TABLET 81 MG: 81 TABLET CHEWABLE at 11:52

## 2025-01-01 RX ADMIN — HEPARIN SODIUM 1800 UNITS/HR: 10000 INJECTION, SOLUTION INTRAVENOUS at 07:27

## 2025-01-01 RX ADMIN — POTASSIUM CHLORIDE 20 MEQ: 29.8 INJECTION INTRAVENOUS at 23:05

## 2025-01-01 RX ADMIN — PROPOFOL 50 MCG/KG/MIN: 10 INJECTION, EMULSION INTRAVENOUS at 20:01

## 2025-01-01 RX ADMIN — Medication 400 MG: at 14:06

## 2025-01-01 RX ADMIN — SODIUM NITROPRUSSIDE 0.25 MCG/KG/MIN: 25 INJECTION, SOLUTION, CONCENTRATE INTRAVENOUS at 05:22

## 2025-01-01 RX ADMIN — POLYETHYLENE GLYCOL 3350 17 G: 17 POWDER, FOR SOLUTION ORAL at 19:35

## 2025-01-01 RX ADMIN — POTASSIUM CHLORIDE 20 MEQ: 29.8 INJECTION INTRAVENOUS at 06:35

## 2025-01-01 ASSESSMENT — ACTIVITIES OF DAILY LIVING (ADL)
ADLS_ACUITY_SCORE: 69
ADLS_ACUITY_SCORE: 63
ADLS_ACUITY_SCORE: 63
ADLS_ACUITY_SCORE: 67
ADLS_ACUITY_SCORE: 69
ADLS_ACUITY_SCORE: 67
ADLS_ACUITY_SCORE: 63
ADLS_ACUITY_SCORE: 67
ADLS_ACUITY_SCORE: 63
ADLS_ACUITY_SCORE: 69
ADLS_ACUITY_SCORE: 63
ADLS_ACUITY_SCORE: 67
ADLS_ACUITY_SCORE: 63

## 2025-01-01 NOTE — PROCEDURES
ECMO [place] STUDY  2025    FiO2 (%): 50 %, Resp: 14, Vent Mode: CMV/AC, Resp Rate (Set): 14 breaths/min, Tidal Volume (Set, mL): 430 mL, PEEP (cm H2O): 8 cmH2O, Resp Rate (Set): 14 breaths/min, Tidal Volume (Set, mL): 430 mL, PEEP (cm H2O): 8 cmH2O     Mode: V-A   Pump Type: Cardiohelp  RPM's: (S) 3700 (HTN)  Blood Flow (Circuit) LPM: (S) 4.08 LPM (HTN)  Sweep LPM: (S) 1 LPM  Sweep FiO2   %: 70 %  Venous Pressure  mmHg: -64 mmHg  Arterial Pressure  mmH mmHg  Internal Pressure mmH mmHg  Pressure Change mmH mmHg     Activated Clotting Time (Celite) POCT   Date Value Ref Range Status   2025 178 (H) 74 - 150 seconds Final       Flow HR BP SpO2 SvO2 Pressors   4.6 L 89 bpm 133/88 (98) 100% 87.1% Nipride 0.25 mcg/kg/min   3.5 L 90 bpm 122/72 (86) 100% 86% Nipride 0.25 mcg/kg/min   2.5 L 88 bpm 133/66 (86) 100% 85.9% Nipride off   1.0 L 82 bpm 113/55 (72) 100% 81.2% Nipride off     ABG at lowest flow: 7.37/39/114/50/23 LA 0.7         Summary:  Severely reduced LV function, LVEF=20-25%. Normal LV size, LVIDd 4.7 cm.  Severely reduced RV function. Septum midline. No pericardial effusion.  With turndown to 1.0 L/min: LV improves to 30-35%. LV size is unchnaged (LVIDd 4.7 cm.) RV function improves slightly to moderately reduced.  This study was compared with the study from 24: Baseline images are  unchanged. Post-turndown images show improvement in LV/RV function.    --Hemodynamically stable with turndown requiring decrease in vasodilator during study. Patient required no increase in ventilation or application of vasopressor. Patient was on 50% FiO2 on the vent during the study.     Plan:  --The patient is ready for decannulation from our perspective     VANCE Salgado, CNP  Critical Care Cardiology  Securely message with the Vocera Web Console

## 2025-01-01 NOTE — PLAN OF CARE
Problem: Adult Inpatient Plan of Care  Goal: Absence of Hospital-Acquired Illness or Injury  Intervention: Identify and Manage Fall Risk  Recent Flowsheet Documentation  Taken 12/31/2024 1600 by Chaz Limon RN  Safety Promotion/Fall Prevention: activity supervised  Taken 12/31/2024 1200 by Chaz Limon RN  Safety Promotion/Fall Prevention: activity supervised  Intervention: Prevent Skin Injury  Recent Flowsheet Documentation  Taken 12/31/2024 1800 by Chaz Limon RN  Body Position:   turned   right  Taken 12/31/2024 1600 by Chaz Limon RN  Body Position:   turned   left  Taken 12/31/2024 1400 by Chaz Limon RN  Body Position:   turned   right  Taken 12/31/2024 1200 by Chaz Limon RN  Body Position:   turned   left  Goal: Optimal Comfort and Wellbeing  Intervention: Monitor Pain and Promote Comfort  Recent Flowsheet Documentation  Taken 12/31/2024 1600 by Chaz Limon RN  Pain Management Interventions:   medication (see MAR)   around-the-clock dosing utilized  Taken 12/31/2024 1200 by Chaz Limon RN  Pain Management Interventions:   medication (see MAR)   around-the-clock dosing utilized  Intervention: Provide Person-Centered Care  Recent Flowsheet Documentation  Taken 12/31/2024 1600 by Chaz Limon RN  Trust Relationship/Rapport: care explained  Taken 12/31/2024 1200 by Chaz Limon RN  Trust Relationship/Rapport: care explained   Goal Outcome Evaluation:

## 2025-01-01 NOTE — PLAN OF CARE
"  Problem: Adult Inpatient Plan of Care  Goal: Plan of Care Review  Description: The Plan of Care Review/Shift note should be completed every shift.  The Outcome Evaluation is a brief statement about your assessment that the patient is improving, declining, or no change.  This information will be displayed automatically on your shift  note.  Outcome: Progressing  Goal: Patient-Specific Goal (Individualized)  Description: You can add care plan individualizations to a care plan. Examples of Individualization might be:  \"Parent requests to be called daily at 9am for status\", \"I have a hard time hearing out of my right ear\", or \"Do not touch me to wake me up as it startles  me\".  Outcome: Progressing  Goal: Absence of Hospital-Acquired Illness or Injury  Outcome: Progressing  Intervention: Identify and Manage Fall Risk  Recent Flowsheet Documentation  Taken 12/31/2024 1600 by Chaz Limon RN  Safety Promotion/Fall Prevention: activity supervised  Taken 12/31/2024 1200 by Chaz Limon RN  Safety Promotion/Fall Prevention: activity supervised  Intervention: Prevent Skin Injury  Recent Flowsheet Documentation  Taken 12/31/2024 1800 by Chaz Limon RN  Body Position:   turned   right  Taken 12/31/2024 1600 by Chaz Limon RN  Body Position:   turned   left  Taken 12/31/2024 1400 by Chaz Limon RN  Body Position:   turned   right  Taken 12/31/2024 1200 by Chaz Limon RN  Body Position:   turned   left  Goal: Optimal Comfort and Wellbeing  Outcome: Progressing  Intervention: Monitor Pain and Promote Comfort  Recent Flowsheet Documentation  Taken 12/31/2024 1600 by Chaz Limon RN  Pain Management Interventions:   medication (see MAR)   around-the-clock dosing utilized  Taken 12/31/2024 1200 by Chaz Limon RN  Pain Management Interventions:   medication (see MAR)   around-the-clock dosing utilized  Intervention: Provide Person-Centered Care  Recent Flowsheet Documentation  Taken 12/31/2024 " 1600 by Chaz Limon, RN  Trust Relationship/Rapport: care explained  Taken 12/31/2024 1200 by Chaz Limon, RODERICK  Trust Relationship/Rapport: care explained  Goal: Readiness for Transition of Care  Outcome: Progressing     Problem: ECLS (Extracorporeal Life Support)  Goal: Optimal Coping with Therapy  Outcome: Progressing  Goal: Absence of Bleeding  Outcome: Progressing  Goal: Optimal Device Function  Outcome: Progressing  Goal: Hemodynamic Stability  Outcome: Progressing  Goal: Absence of Infection Signs and Symptoms  Outcome: Progressing   Goal Outcome Evaluation:

## 2025-01-01 NOTE — PLAN OF CARE
PMHx: DM2, HTN, PVD, and neuropathic pain, who [place] [facility] [place] [facility]/o arm pain, vomiting, and diarrhea with c/f ACS. ST depression. Patient had a VT cardiac arrest which resolved with 1 round of CPR, epinephrine, defibrillation. Patient became hypotensive and was subsequently cannulated for VA ECMO.     12/31 Cannulated for VA ecmo, plan for CABG later this week     Nights  MARE    Shift summary:  Pt very difficult to sedate opens eyes and lifts hands even sits up in bed.  Hypertension with Nipride gtt started overnight and helpful.  No blood products or IV fluid overnights.      Neuro: Sedated, Needs high doses of Prop and versed wakes up and OGLESBY  does not follow commands.,    CV: SR/SB 50s-60s, VA ECMO 70%, 4.4L flow, 0.5L sweep, 4K RPM     Pulm: CMV 6+0%/420/14/8     GI: OG to LISx, No BM yet.     : Rodriguez 30cc/hr    Skin:     GTTs:  Prop 60  Fent 200  Versed 8  Amio 1   Nipride 1  Insulin 2    Labs   K+ 3.4 and replaced this am.  's with insulin gtt restarted this am.     Plan  Eventually pt needs a CABG.

## 2025-01-01 NOTE — PROGRESS NOTES
[facility]    ECLS Shift Summary:     ECMO Equipment:  Console Serial Number: [id number]  Circuit Lot Number: [id number]  Oxygenator Lot Number: [id number]    Circuit Assessment: Fibrin  Fibrin Location: connectors    Arterial ECMO Cannula: 17 Fr in the Right Femoral Artery  Venous ECMO Cannula: 25 Fr in the Right Femoral Vein  Distal Perfusion Catheter: 8 Fr in the Right Superficial Femoral Artery    ECMO Cannula Venous Right femoral vein-Site Assessment: WDL  ECMO Cannula Arterial Right femoral artery-Site Assessment: WDL  Distal Perfusion Catheter Right superficial femoral artery-Site Assessment: WDL  ECMO Cannula Venous Right femoral vein-Site Intervention: No intervention needed  ECMO Cannula Arterial Right femoral artery-Site Intervention: No intervention needed  Distal Perfusion Catheter Right superficial femoral artery-Site Intervention: No intervention needed    Patient remains on V-A ECMO, all equipment is functioning and alarms are appropriately set. RPM's: 3985 with Blood Flow (Circuit) LPM  Avg: 3.7 LPM  Min: 3.07 LPM  Max: 4.05 LPM L/min. Sweep is at 0.75 LPM and 60 %. Extremities are warm and well perfused, right BKA.     Significant Shift Events: Turndown completed this morning. Patient scheduled for CABG tomorrow AM with Dr. Rosenbaum. Utilize ECMO flows to optimize BP, avoiding need for vasopressors and vasodilators.     Vent settings:  FiO2 (%): 40 %, Resp: 14, Vent Mode: CMV/AC, Resp Rate (Set): 14 breaths/min, Tidal Volume (Set, mL): 430 mL, PEEP (cm H2O): 8 cmH2O, Resp Rate (Set): 14 breaths/min, Tidal Volume (Set, mL): 430 mL, PEEP (cm H2O): 8 cmH2O    Anticoagulation:  Dose (units/hr) HEParin: 1800 Units/hr  Rate (mL/hr) HEParin: 18 mL/hr  Concentration HEParin: 100 Units/mL        Most recent: ACT  (seconds): 186 seconds    Urine output is as charted per RN, clear cory w/o foul odor'.  Blood loss was minimal oozing from cannula site Product given  included none.      Intake/Output Summary (Last 24 hours) at 1/1/2025 1648  Last data filed at 1/1/2025 1600  Gross per 24 hour   Intake 1968.38 ml   Output 1239 ml   Net 729.38 ml       Labs:  Recent Labs   Lab 01/01/25  1607 01/01/25  1605 01/01/25  1410 01/01/25  1155 01/01/25  1012   PH  --  7.41 7.41 7.42 7.45   PCO2  --  37 39 37 34*   PO2  --  84 134* 90 182*   HCO3  --  23 24 24 24   O2PER 60  40 40 40 40 50       Lab Results   Component Value Date    HGB 9.6 (L) 01/01/2025    PHGB 50 (H) 01/01/2025     (L) 01/01/2025    FIBR 387 01/01/2025    INR 1.24 (H) 01/01/2025     (HH) 01/01/2025    DD 2.13 (H) 01/01/2025    ANTCH 70 (L) 01/01/2025       Plan is maintain VA ECMO support, OR for CABG tomorrow morning.     Sharonda Bragg RN  ECMO Specialist  1/1/2025 4:48 PM

## 2025-01-01 NOTE — ED NOTES
0844-CPR started  0847-In stab room, pads and jaspal placed, no pulse on check  0849-no pulse on check, epi x1  0851-pulse check, shock x 1

## 2025-01-01 NOTE — PLAN OF CARE
Goal Outcome Evaluation:    Neuro: Sedated, RASS -2/-3, OGLESBY spontaneously. Does not follow commands.  CV: SR/SB 50s-80s, no noted ectopy. Afebrile. VA ECMO 60%, 4L flow, 0.5L sweep, 3500 RPM. Turndown to 1LPM with noted improvement in LV and RV function.  Pulm: CMV 40%/430/14/8. Moderate secretions. Sputum Cx sent.  GI: OG @ 70cm, to LISx with bilious OP.  : Rodriguez 30cc/hr, cory.  Skin: Forehead/chin abrasions. R BKA, healed.    For vital signs and complete assessments, please see documentation flowsheets.        Plan of Care Reviewed With: patient, significant other    Overall Patient Progress: no changeOverall Patient Progress: no change    Outcome Evaluation: Sedation weaned. ECMO turndown to 1LPM.

## 2025-01-01 NOTE — PROGRESS NOTES
ECMO Attending Progress Note  1/1/2025    Erwin Aguilar is a 64 year old male who was cannulated for ECMO 12/31/24 due to VT arrest with ROSC and shock in the setting of MV disease including LM.     Cannulation Site:  17 Fr in the R femoral artery  25 Fr in the R femoral vein  8Fr in the RSFA    Interval events: NAEO labs improving     Physical Exam:  Temp:  [97.7  F (36.5  C)-99  F (37.2  C)] 98.8  F (37.1  C)  Pulse:  [] 94  Resp:  [13-19] 15  BP: ()/() 142/125  MAP:  [62 mmHg-110 mmHg] 102 mmHg  Arterial Line BP: ()/(51-87) 149/87  FiO2 (%):  [40 %-90 %] 50 %  SpO2:  [89 %-100 %] 100 %    Intake/Output Summary (Last 24 hours) at 1/1/2025 0757  Last data filed at 1/1/2025 0700  Gross per 24 hour   Intake 2918.39 ml   Output 1670 ml   Net 1248.39 ml    FiO2 (%): 50 %, Resp: 15, Vent Mode: CMV/AC, Resp Rate (Set): 14 breaths/min, Tidal Volume (Set, mL): 430 mL, PEEP (cm H2O): 8 cmH2O, Resp Rate (Set): 14 breaths/min, Tidal Volume (Set, mL): 430 mL, PEEP (cm H2O): 8 cmH2O     Labs:  Recent Labs   Lab 01/01/25  0602 01/01/25  0428 01/01/25  0417 01/01/25  0200 01/01/25  0004   PH 7.36 7.36  --  7.34* 7.34*   PCO2 38 40  --  42 42   PO2 104 101  --  59* 119*   HCO3 22 22  --  23 23   O2PER 60 60 60  70 60 40      Recent Labs   Lab 01/01/25  0417 12/31/24  2214 12/31/24  1740 12/31/24  1107   WBC 7.3 7.8 8.8 16.4*  16.4*   HGB 10.7* 10.8* 10.8* 12.4*  12.4*     Creatinine   Date Value Ref Range Status   01/01/2025 0.62 (L) 0.67 - 1.17 mg/dL Final   12/31/2024 0.66 (L) 0.67 - 1.17 mg/dL Final   12/31/2024 0.59 (L) 0.67 - 1.17 mg/dL Final   12/31/2024 0.64 (L) 0.67 - 1.17 mg/dL Final   04/09/2021 0.68 0.66 - 1.25 mg/dL Final   11/23/2020 0.65 (L) 0.66 - 1.25 mg/dL Final   11/09/2020 0.85 0.66 - 1.25 mg/dL Final   07/20/2020 0.64 (L) 0.66 - 1.25 mg/dL Final       Blood Flow (Circuit) LPM: (S) 4.08 LPM (HTN)  Sweep LPM: (S) 1 LPM  Sweep FiO2   %: 70 %  ACT  (seconds): 178 seconds  Pulse Oximetry   (SpO2%): 100 %  Arterial Pressure  mmH mmHg      ECMO Issues including assessments and plan on DOS 2025:  Neuro: Sedated for mechanical ventilation and ECMO.  No acute distress.  NIRS stable  b/l  RASS goal: -2-3  CV: Cardiogenic shock.  Hemodynamically stable on vasodilators, pulse pressure 60mmHg  Pulm: Keep vent settings at rest settings as above.  FEN/Renal: Electrolytes stable w/ replacement protocols in place, Cr stable, UOP stable  Heme: ACT goal: 180-200, Hemoglobin stable .  Minimal oozing around the ECMO cannulas.  ID: Receiving empiric antibiotics  Cannulae: Position is acceptable on exam and the available imaging.  Distal perfusion cannula is in place and patent.  Extremities are well-perfused.     I have personally reviewed the ECMO flows, oxygenation and CO2 clearance, anticoagulation, and cannula position.  I have also personally assessed the patient's systemic response with hemodynamics, oxygenation, ventilation, and bleeding.       The patient requires continued ECMO support and management in the ICU.  I have discussed patient care and treatment plan with the primary team.      Manisha Geronimo MD  Cardiology critical care  Pager

## 2025-01-01 NOTE — PROGRESS NOTES
INITIAL REPORT of Video-EEG Monitoring              DATE OF RECORDIN2024         I reviewed the first 2 hours of video-EEG monitoring of Erwin Aguilar.          The EEG during sedated coma was abnormal due to generalized delta-theta slowing, with intermixture of faster alpha-beta activities.  No interictal epileptiform abnormalities and no electrographic seizures were observed.         These abnormalities indicate severe electrographic encephalopathy.  This recording excludes non-convulsive status epilepticus as a possible cause of this encephalopathy.    Jayson Major M.D.

## 2025-01-01 NOTE — PROGRESS NOTES
Cardiology ICU H&P Note    HPI:  Erwin Aguilar is a 64 year old male with a past medical history of DM2, HTN, PVD, and neuropathic pain, who presented to the emergency department on the [place] here at the  for c/o arm pain, vomiting, and diarrhea with c/f ACS. Patient was evaluated by ED staff and 12 lead demonstrated some ST depression in the lateral leads which was communicated to interventional staff, after decision was made to go to the cath lab patient had a VT cardiac arrest which resolved with 1 round of CPR, epinephrine, defibrillation. Patient's was intubated for airway protection and his rhythm after ROSC was afib with RVR. He had an adequate BP, however after rapid transport to the cath lab patient became hypotensive and was subsequently cannulated for VA ECMO.     Subjective and Interval:  - mild hypoxia overnight    Assessment and plan by system:  ==Today's changes ==  - continue ASA daily  - stop vanc  - turndown study was great  - OR for CABG per surgical team  - minimize flows to not use vasodilators     == Neurology ==   #encephalopathy  -Intubated, sedated. Avoiding hyperthermia  -vEEG > no seizures    CT head shows > NAICP    Current sedation: fentanyl, versed, propofol infusion     Plan:  -Continue sedation with a RASS goal -2 to 3  -Spontaneous awakening trial as tolerated  -Permissive hypernatremia for neuroprotection    == Cardiovascular ==  #VT Cardiac arrest   #Shock requiring VA ECMO  #CAD  #Cardiomyopathy  #Dyslipidemia  Peripheral V-A ECMO inserted via  DRPC, RCFA, and RCFV   Angiogram:   Severe obstructive multivessel coronary artery disease  Severe obstructive distal left main at a complex calcified bifurcation lesion of the ostial LAD, a large ramus branch, as well as the ostial left circumflex  Severe obstructive disease of the ostial RCA as well as the distal RCA  Cardiothoracic surgery consult placed for bypass evaluation to be done as inpatient  Successful insertion of VA  ECMO as well as distal reperfusion cannula  Successful insertion of right arterial radial line  Successful insertion of left femoral venous triple-lumen central line  Successful insertion of left femoral arterial arterial line    TTE: look at my turndown study  ECG Rhythm: Sinus bradycardia    ECMO :  Mode: V-A   Pump Type: Cardiohelp  RPM's: (S) 3700 (HTN)  Blood Flow (Circuit) LPM: (S) 4.08 LPM (HTN)  Sweep LPM: (S) 1 LPM  Sweep FiO2   %: 70 %  Venous Pressure  mmHg: -64 mmHg  Arterial Pressure  mmH mmHg  Internal Pressure mmH mmHg  Pressure Change mmH mmHg    Current Pressors/inotropes/antiarrythmic:    Hemodynamics:  Art Line  Arterial Line BP: 122/78  Arterial Line MAP (mmHg): 89 mmHg  Arterial Line Location: Right radial  Art Line Wave Form: Appropriate wave forms    Invasive Hemodynamic Monitoring:                            Plan:  -Continue ASA 81mg  -Hold lipitor for now given shock liver  -Hold ACE/ARB for now given likely reduced renal fxn after arrest  -Hold beta blocker given shock  -Telemetry monitoring  -Trend troponin   -Continue ECMO support  -amiodarone infusion   -FBG even for now, Diuresis hold for now  -CVTS following and appreciate assistance     == Pulmonary ==  #Acute hypoxemic respiratory failure requiring intubation  #Probable aspiration pneumonia    Vent Settings:   FiO2 (%): 50 %, Resp: 14, Vent Mode: CMV/AC, Resp Rate (Set): 14 breaths/min, Tidal Volume (Set, mL): 430 mL, PEEP (cm H2O): 8 cmH2O, Resp Rate (Set): 14 breaths/min, Tidal Volume (Set, mL): 430 mL, PEEP (cm H2O): 8 cmH2O    Plan:  -Wean vent as able  -Daily CXR  -Serial ABGs  -Peridex BID  -FBG and diuresis as above        == Gastrointestinal, Nutrition ==  #Concern for Shock liver 2/2 cardiac arrest  #Hypoalbuminemia     Diet NPO in immediate post arrest setting    Plan:  -Monitor LFTs  -Bowel regimen in place  -GI Prophylaxis: PPI; Protonix 40 mg daily     == Renal, Electrolytes ==  #Concern for Acute Renal  "Injury 2/2 cardiac arrest  #Lactic acidosis  #hypokalemia   #hypocalcemia  #hypomagnesemia   #hypoalbuminemia    Plan:  -Avoid nephrotoxins, renally dose meds  -Monitor urine output  -FBG and diuresis as above    == Infectious Disease ==  #Aspiration Pneumonia- in the setting of arrest.  CXR/CAP as above.   #Leukocytosis    Plan:  -Continue CTX x 5D for aspiration coverage  -Monitor for signs of infection  -Avoid fevers     == Hematology ==  # Anemia of critical illness  #Drug induced coagulation and platelet defects    Hgb stable. No e/o bleeding   Receiving heparin for ECMO with ACT goal 180-200    US LE w/ arterial duplex pending  DVT PPX: Heparin as above    Plan:  -Continue Heparin with ACT goal as above  -Transfusion parameters:   -1u PRBC for hbg < 7.0   -1u platelet for plt < 50   -1u FFP for INR > 3   -5u cryoprecipitate for fibrinogen <150     == Endocrinology ==  #Hyperglycemia     Plan  -Insulin infusion as needed    == Integumentary ==  -No skin issues  -CTM cannula sites    == Lines/Tubes/Drains ==  Cannula: right  Airway: ETT  Enteric: OGT  Central access: left fem  Arterial access: right radial, left fem  Urinary: yes  Rectal Tube: no    ICU Checklist  Feeding: NPO  Analgesia: fentanyl infusion  Sedation: propofol infusion   Thrombopx: heparin infusion  Head of bed: RT  Ulcers: PPI  Glucose: insulin infusion   SBT: hold  Bowel regimen: ordered  Indwelling cath: yes, needed  De-escalate meds: none  Code Status: Full Code , eCPR candidate? On support  Family updated by team. Patient seen and discussed with staff physician, Dr. Manisha Navarro.     Juan Lopez, APRN, CNP  Critical Care Cardiology  Securely message with the Vocera Web Console (learn more here)    Objective:  Most recent vital signs:  BP (!) 142/125   Pulse 92   Temp 98.8  F (37.1  C)   Resp 14   Ht 1.803 m (5' 11\")   Wt 76.6 kg (168 lb 14 oz)   SpO2 100%   BMI 23.55 kg/m    Temp:  [97.7  F (36.5  C)-99  F (37.2 "  C)] 98.8  F (37.1  C)  Pulse:  [] 92  Resp:  [13-19] 14  BP: ()/() 142/125  MAP:  [62 mmHg-110 mmHg] 89 mmHg  Arterial Line BP: ()/(51-87) 122/78  FiO2 (%):  [40 %-90 %] 50 %  SpO2:  [89 %-100 %] 100 %    Physical exam:  General: critically ill, supine in bed, in NAD  HEENT: PERRL, no scleral icterus or injection  CARDIAC: RRR, no m/r/g appreciated. Peripheral pulses dopplered  RESP: synchronous with mechanical ventilation, CTAB, no wheezes, rhonchi or crackles appreciated.  GI: soft, non-distended, BS hypoactive  : Urinary catheter present  EXTREMITIES: No BLE edema, pulses 2+. Femoral access site w/o bleeding, dressing C/D/I.   SKIN: No acute lesions appreciated on exposed skin.  NEURO: Intubated and sedated. Unable to assess language or mentation. Unresponsive to noxious stim x 4 extremities. No focal deficit.     Imaging/procedure results:  Results for orders placed or performed during the hospital encounter of 12/31/24 (from the past 24 hours)   CBC with platelets differential    Narrative    The following orders were created for panel order CBC with platelets differential.  Procedure                               Abnormality         Status                     ---------                               -----------         ------                     CBC with platelets and d...[[id number]]  Abnormal            Final result                 Please view results for these tests on the individual orders.   Comprehensive metabolic panel   Result Value Ref Range    Sodium 142 135 - 145 mmol/L    Potassium 3.1 (L) 3.4 - 5.3 mmol/L    Carbon Dioxide (CO2) 18 (L) 22 - 29 mmol/L    Anion Gap 18 (H) 7 - 15 mmol/L    Urea Nitrogen 4.7 (L) 8.0 - 23.0 mg/dL    Creatinine 0.58 (L) 0.67 - 1.17 mg/dL    GFR Estimate >90 >60 mL/min/1.73m2    Calcium 8.4 (L) 8.8 - 10.4 mg/dL    Chloride 106 98 - 107 mmol/L    Glucose 171 (H) 70 - 99 mg/dL    Alkaline Phosphatase 123 40 - 150 U/L    AST 21 0 - 45 U/L    ALT <5  0 - 70 U/L    Protein Total 6.8 6.4 - 8.3 g/dL    Albumin 3.8 3.5 - 5.2 g/dL    Bilirubin Total 0.6 <=1.2 mg/dL   Troponin T, High Sensitivity   Result Value Ref Range    Troponin T, High Sensitivity 33 (H) <=22 ng/L   Lipase   Result Value Ref Range    Lipase 10 (L) 13 - 60 U/L   CBC with platelets and differential   Result Value Ref Range    WBC Count 5.2 4.0 - 11.0 10e3/uL    RBC Count 4.75 4.40 - 5.90 10e6/uL    Hemoglobin 14.2 13.3 - 17.7 g/dL    Hematocrit 39.9 (L) 40.0 - 53.0 %    MCV 84 78 - 100 fL    MCH 29.9 26.5 - 33.0 pg    MCHC 35.6 31.5 - 36.5 g/dL    RDW 12.4 10.0 - 15.0 %    Platelet Count 156 150 - 450 10e3/uL    % Neutrophils 78 %    % Lymphocytes 14 %    % Monocytes 6 %    % Eosinophils 2 %    % Basophils 1 %    % Immature Granulocytes 0 %    NRBCs per 100 WBC 0 <1 /100    Absolute Neutrophils 4.0 1.6 - 8.3 10e3/uL    Absolute Lymphocytes 0.7 (L) 0.8 - 5.3 10e3/uL    Absolute Monocytes 0.3 0.0 - 1.3 10e3/uL    Absolute Eosinophils 0.1 0.0 - 0.7 10e3/uL    Absolute Basophils 0.0 0.0 - 0.2 10e3/uL    Absolute Immature Granulocytes 0.0 <=0.4 10e3/uL    Absolute NRBCs 0.0 10e3/uL   XR Chest 2 Views    Narrative    EXAM: XR CHEST 2 VIEWS  12/31/2024 8:14 AM     HISTORY:  chest pain       COMPARISON:  Chest radiograph 10/19/21    FINDINGS:   Trachea is midline. Cardiac silhouette is within normal limits.  Pulmonary vasculature is distinct. Diffuse hazy appearance throughout  the lungs without focal consolidative opacity. No pleural effusion. No  pneumothorax.    No acute osseous abnormality. Degenerative changes. Visualized soft  tissues and upper abdomen are unremarkable.         Impression    IMPRESSION:   Diffuse hazy appearance of the lungs without focal consolidative  opacity. Findings are nonspecific, may represent atypical infection,  inflammation, or edema.    I have personally reviewed the examination and initial interpretation  and I agree with the findings.    AYLEEN WATSON MD          SYSTEM ID:  [id number]   EKG 12-lead, tracing only   Result Value Ref Range    Systolic Blood Pressure  mmHg    Diastolic Blood Pressure  mmHg    Ventricular Rate 68 BPM    Atrial Rate 68 BPM    VT Interval 172 ms    QRS Duration 92 ms     ms    QTc 474 ms    P Axis 70 degrees    R AXIS 70 degrees    T Axis -56 degrees    Interpretation ECG       Sinus rhythm with Premature atrial complexes  Marked ST abnormality, possible anterior subendocardial injury  Abnormal ECG  Unconfirmed report - interpretation of this ECG is computer generated - see medical record for final interpretation    Confirmed by - EMERGENCY ROOM, PHYSICIAN ([id number]),  DARREN HOOPER ([id number]) on 12/31/2024 3:13:59 PM     Glucose by meter   Result Value Ref Range    GLUCOSE BY METER POCT 158 (H) 70 - 99 mg/dL   Troponin T, High Sensitivity   Result Value Ref Range    Troponin T, High Sensitivity 91 (H) <=22 ng/L   iStat Gases Electrolytes ICA Glucose Venous, POCT   Result Value Ref Range    CPB Applied No     Hematocrit POCT 42 40 - 53 %    Calcium, Ionized Whole Blood POCT 4.5 4.4 - 5.2 mg/dL    Glucose Whole Blood POCT 158 (H) 70 - 99 mg/dL    Bicarbonate Venous POCT 21 21 - 28 mmol/L    Hemoglobin POCT 14.3 13.3 - 17.7 g/dL    Potassium POCT 3.0 (L) 3.4 - 5.3 mmol/L    Sodium POCT 145 135 - 145 mmol/L    pCO2 Venous POCT 43 40 - 50 mm Hg    pO2 Venous POCT 21 (L) 25 - 47 mm Hg    pH Venous POCT 7.28 (L) 7.32 - 7.43    O2 Sat, Venous POCT 28 (L) 70 - 75 %    Base Excess/Deficit (+/-) POCT -6.0 (L) -3.0 - 3.0 mmol/L   iStat Troponin, POCT   Result Value Ref Range    TROPPC POCT 0.49 (HH) <=0.12 ug/L   -Intubation    Narrative    Antonio Kelley MD     12/31/2024  9:12 AM  -Intubation    Date/Time: 12/31/2024 9:05 AM    Performed by: Antonio Kelley MD  Authorized by: Antonio Kelley MD    Emergent condition/consent implied    Pre-procedure details:     Indications: cardio/pulmonary arrest      Patient status:   Unresponsive    Neck mobility: normal      Pharmacologic strategy: RSI      Induction agents:  Etomidate    Paralytics:  Succinylcholine  Procedure details:     Preoxygenation:  Bag valve mask    CPR in progress: no      Number of attempts:  1  Successful intubation attempt details:     Intubation technique: video assisted      Laryngoscope blade:  Mac 4    Bougie used: no      Tube size (mm):  7.5    Tube type:  Cuffed    Tube visualized through cords: yes    Placement assessment:     ETT at teeth/gumline (cm):  23    Tube secured with:  ETT duffy    Breath sounds:  Equal    Placement verification: chest rise, colorimetric ETCO2, direct   visualization, esophageal detector, tube exhalation and waveform ETCO2    Post-procedure details:     Procedure completion:  Patient tolerated the procedure well with no   immediate complications    PROCEDURE    Patient Tolerance:  Patient tolerated the procedure well with no immediate   complications   Activated clotting time celite, POCT   Result Value Ref Range    Activated Clotting Time (Celite) POCT 284 (H) 74 - 150 seconds   Cardiac Catheterization    Narrative      Ost RCA to Prox RCA lesion is 95% stenosed.    Mid RCA lesion is 50% stenosed.    Dist RCA lesion is 60% stenosed.    RPAV lesion is 60% stenosed.    Mid LM to Prox LAD lesion is 80% stenosed.    Ost Cx to Prox Cx lesion is 80% stenosed.    Ramus lesion is 80% stenosed.    Central Venous Catheter  was placed using a CATH CENTRAL LINE MULTI   LUMEN [id number]. Ultrasound was used to visualize the left   femoral vein. Placement was successful.    Arterial Line was placed. Ultrasound was used to visualize the left   femoral artery left femoral artery. Placement was successful.    Arterial Line was placed.  the right radial artery right radial artery.   Placement was successful.    Severe obstructive multivessel coronary artery disease  Severe obstructive distal left main at a complex calcified  bifurcation   lesion of the ostial LAD, a large ramus branch, as well as the ostial left   circumflex  Severe obstructive disease of the ostial RCA as well as the distal RCA  Cardiothoracic surgery consult placed for bypass evaluation to be done as   inpatient  Successful insertion of VA ECMO as well as distal reperfusion cannula  Successful insertion of right arterial radial line  Successful insertion of left femoral venous triple-lumen central line  Successful insertion of left femoral arterial arterial line     ABO/Rh type and screen    Narrative    The following orders were created for panel order ABO/Rh type and screen.  Procedure                               Abnormality         Status                     ---------                               -----------         ------                     Adult Type and Screen[[id number]]                            Final result                 Please view results for these tests on the individual orders.   CRP inflammation   Result Value Ref Range    CRP Inflammation 7.20 (H) <5.00 mg/L   CBC with platelets differential    Narrative    The following orders were created for panel order CBC with platelets differential.  Procedure                               Abnormality         Status                     ---------                               -----------         ------                     CBC with platelets and d...[[id number]]  Abnormal            Final result                 Please view results for these tests on the individual orders.   Cortisol   Result Value Ref Range    Cortisol 23.0   ug/dL   D dimer quantitative   Result Value Ref Range    D-Dimer Quantitative 7.43 (H) 0.00 - 0.50 ug/mL FEU    Narrative    This D-dimer assay is intended for use in conjunction with a clinical pretest probability assessment model to exclude pulmonary embolism (PE) and deep venous thrombosis (DVT) in outpatients suspected of PE or DVT. The cut-off value is 0.50 ug/mL FEU.    For patients  50 years of age or older, the application of age-adjusted cut-off values for D-Dimer may increase the specificity without significant effect on sensitivity. The literature suggested calculation age adjusted cut-off in ug/L = age in years x 10 ug/L. The results in this laboratory are reported as ug/mL rather than ug/L. The calculation for age adjusted cut off in ug/mL= age in years x 0.01 ug/mL. For example, the cut off for a 76 year old male is 75 x 0.01 ug/mL = 0.76 ug/mL (760 ug/L).    [address] et al. Age adjusted D-dimer cut-off levels to rule out pulmonary embolism: The ADJUST-PE Study. SEYMOUR 2014;311:5704-6301.; HJ Ingrid et al. Diagnostic accuracy of conventional or age adjusted D-dimer cutoff values in older patients with suspected venous thromboembolism. Systemic review and meta-analysis. BMJ 2013:346:f2492.   Erythrocyte sedimentation rate auto   Result Value Ref Range    Erythrocyte Sedimentation Rate 6 0 - 20 mm/hr   Hemoglobin plasma   Result Value Ref Range    Hemoglobin Plasma <30 <30 mg/dL   Lactate Dehydrogenase   Result Value Ref Range    Lactate Dehydrogenase 335 (H) 0 - 250 U/L   Procalcitonin   Result Value Ref Range    Procalcitonin 0.05 <0.50 ng/mL   TEG with Heparinase   Result Value Ref Range    R (Time until clot forms) 8.1 5.0 - 10.0 Minute    K ( Time to Spec. clot strength) 1.9 1.0 - 3.0 Minute    Angle (Rate of Clot Growth) 62.4 53.0 - 72.0 Degrees    MA ( Maximum Clot Strength) 66.8 50.0 - 70.0 mm    CI (coagulation index) -0.9 -3.0 - 3.0    G (actual clot strength) 10.1 4.5 - 11.0 Kd/sc    LY30 (lysis at 30 minutes) 0.0 0.0 - 8.0 %    LY60 (lysis at 60 minutes) 0.9 0.0 - 15.0 %   Troponin T, High Sensitivity   Result Value Ref Range    Troponin T, High Sensitivity 2,015 (HH) <=22 ng/L   TSH   Result Value Ref Range    TSH 1.15 0.30 - 4.20 uIU/mL   T3 Free   Result Value Ref Range    T3 Free 3.1 2.0 - 4.4 pg/mL   T3 total   Result Value Ref Range    T3 Total 113 85 - 202 ng/dL   T4  free   Result Value Ref Range    Free T4 1.50 0.90 - 1.70 ng/dL   CBC with platelets   Result Value Ref Range    WBC Count 16.4 (H) 4.0 - 11.0 10e3/uL    RBC Count 4.10 (L) 4.40 - 5.90 10e6/uL    Hemoglobin 12.4 (L) 13.3 - 17.7 g/dL    Hematocrit 35.0 (L) 40.0 - 53.0 %    MCV 85 78 - 100 fL    MCH 30.2 26.5 - 33.0 pg    MCHC 35.4 31.5 - 36.5 g/dL    RDW 12.5 10.0 - 15.0 %    Platelet Count 207 150 - 450 10e3/uL   Comprehensive metabolic panel   Result Value Ref Range    Sodium 140 135 - 145 mmol/L    Potassium 3.1 (L) 3.4 - 5.3 mmol/L    Carbon Dioxide (CO2) 17 (L) 22 - 29 mmol/L    Anion Gap 18 (H) 7 - 15 mmol/L    Urea Nitrogen 5.4 (L) 8.0 - 23.0 mg/dL    Creatinine 0.64 (L) 0.67 - 1.17 mg/dL    GFR Estimate >90 >60 mL/min/1.73m2    Calcium 7.9 (L) 8.8 - 10.4 mg/dL    Chloride 105 98 - 107 mmol/L    Glucose 205 (H) 70 - 99 mg/dL    Alkaline Phosphatase 113 40 - 150 U/L    AST 79 (H) 0 - 45 U/L    ALT 18 0 - 70 U/L    Protein Total 5.8 (L) 6.4 - 8.3 g/dL    Albumin 3.4 (L) 3.5 - 5.2 g/dL    Bilirubin Total 0.7 <=1.2 mg/dL   Calcium ionized whole blood   Result Value Ref Range    Calcium Ionized Whole Blood 4.1 (L) 4.4 - 5.2 mg/dL   Glucose whole blood   Result Value Ref Range    Glucose 199 (H) 70 - 99 mg/dL   Heparin Unfractionated Anti Xa Level   Result Value Ref Range    Anti Xa Unfractionated Heparin >1.10 (HH) For Reference Range, See Comment IU/mL    Narrative    Therapeutic Range: UFH: 0.25-0.50 IU/mL for low intensity dosing,  0.30-0.70 IU/mL for high intensity dosing DVT and PE.  This test is not validated for other direct factor X inhibitors (e.g. rivaroxaban, apixaban, edoxaban, betrixaban, fondaparinux) and should not be used for monitoring of other medications.   INR   Result Value Ref Range    INR 1.43 (H) 0.85 - 1.15   Partial thromboplastin time   Result Value Ref Range    aPTT >240 (HH) 22 - 38 Seconds   Lactic acid whole blood   Result Value Ref Range    Lactic Acid 2.9 (H) 0.7 - 2.0 mmol/L    Magnesium   Result Value Ref Range    Magnesium 1.6 (L) 1.7 - 2.3 mg/dL   Adult Type and Screen   Result Value Ref Range    ABO/RH(D) A POS     Antibody Screen Negative Negative    SPECIMEN EXPIRATION DATE [id number]    CBC with platelets and differential   Result Value Ref Range    WBC Count 16.4 (H) 4.0 - 11.0 10e3/uL    RBC Count 4.10 (L) 4.40 - 5.90 10e6/uL    Hemoglobin 12.4 (L) 13.3 - 17.7 g/dL    Hematocrit 35.0 (L) 40.0 - 53.0 %    MCV 85 78 - 100 fL    MCH 30.2 26.5 - 33.0 pg    MCHC 35.4 31.5 - 36.5 g/dL    RDW 12.5 10.0 - 15.0 %    Platelet Count 207 150 - 450 10e3/uL    % Neutrophils 90 %    % Lymphocytes 5 %    % Monocytes 4 %    % Eosinophils 0 %    % Basophils 0 %    % Immature Granulocytes 1 %    NRBCs per 100 WBC 0 <1 /100    Absolute Neutrophils 14.8 (H) 1.6 - 8.3 10e3/uL    Absolute Lymphocytes 0.8 0.8 - 5.3 10e3/uL    Absolute Monocytes 0.6 0.0 - 1.3 10e3/uL    Absolute Eosinophils 0.0 0.0 - 0.7 10e3/uL    Absolute Basophils 0.0 0.0 - 0.2 10e3/uL    Absolute Immature Granulocytes 0.1 <=0.4 10e3/uL    Absolute NRBCs 0.0 10e3/uL   Blood gas arterial   Result Value Ref Range    pH Arterial 7.42 7.35 - 7.45    pCO2 Arterial 30 (L) 35 - 45 mm Hg    pO2 Arterial 56 (L) 80 - 105 mm Hg    FIO2 40     Bicarbonate Arterial 19 (L) 21 - 28 mmol/L    Base Excess/Deficit Arterial -4.0 (L) -3.0 - 3.0 mmol/L    Elías's Test Artline     Oxyhemoglobin Arterial 89 (L) 92 - 100 %    O2 Sat, Arterial 90.9 (L) 96.0 - 97.0 %    Narrative    In healthy individuals, oxyhemoglobin (O2Hb) and oxygen saturation (SO2) are approximately equal. In the presence of dyshemoglobins, oxyhemoglobin can be considerably lower than oxygen saturation.   Blood gas ELS venous   Result Value Ref Range    pH ELS Venous 7.37 7.32 - 7.43    pCO2 ELS Venous 37 (L) 40 - 50 mm Hg    pO2 ELS Venous 43 25 - 47 mm Hg    Bicarbonate ELS Venous 21 21 - 28 mmol/L    Base Excess/Deficit Venous -3.4 (L) -3.0 - 3.0 mmol/L    FIO2 40      Oxyhemoglobin ELS Venous 76 %    O2 Saturation ELS Venous 77.6 (H) 70.0 - 75.0 %    Narrative    In healthy individuals, oxyhemoglobin (O2Hb) and oxygen saturation (SO2) are approximately equal. In the presence of dyshemoglobins, oxyhemoglobin can be considerably lower than oxygen saturation.   Blood gas ELS arterial   Result Value Ref Range    pH ELS Arterial 7.42 7.35 - 7.45    pCO2 ELS Arterial 29 (L) 35 - 45 mm Hg    pO2 ELS Arterial 286 (H) 80 - 105 mm Hg    Bicarbonate ELS Arterial 18 (L) 21 - 28 mmol/L    Base Excess/Deficit Arterial -5.0 (L) -3.0 - 3.0 mmol/L    FIO2 40     Oxyhemoglobin ELS Arterial 99 75 - 100 %    O2 Saturation ELS Arterial >100.0 (H) 96.0 - 97.0 %    Narrative    In healthy individuals, oxyhemoglobin (O2Hb) and oxygen saturation (SO2) are approximately equal. In the presence of dyshemoglobins, oxyhemoglobin can be considerably lower than oxygen saturation.   Glucose by meter   Result Value Ref Range    GLUCOSE BY METER POCT 198 (H) 70 - 99 mg/dL   Urine Drug Screen    Narrative    The following orders were created for panel order Urine Drug Screen.  Procedure                               Abnormality         Status                     ---------                               -----------         ------                     Urine Drug Screen Panel[[id number]]      Abnormal            Final result                 Please view results for these tests on the individual orders.   UA with Microscopic reflex to Culture    Specimen: Urine, Rodriguez Catheter   Result Value Ref Range    Color Urine Light Yellow Colorless, Straw, Light Yellow, Yellow    Appearance Urine Clear Clear    Glucose Urine 70 (A) Negative mg/dL    Bilirubin Urine Negative Negative    Ketones Urine 20 (A) Negative mg/dL    Specific Gravity Urine 1.015 1.003 - 1.035    Blood Urine Small (A) Negative    pH Urine 6.0 5.0 - 7.0    Protein Albumin Urine 50 (A) Negative mg/dL    Urobilinogen Urine Normal Normal, 2.0 mg/dL    Nitrite  Urine Negative Negative    Leukocyte Esterase Urine Negative Negative    Mucus Urine Present (A) None Seen /LPF    RBC Urine 9 (H) <=2 /HPF    WBC Urine 4 <=5 /HPF    Narrative    Urine Culture not indicated   Urine Drug Screen Panel   Result Value Ref Range    Amphetamines Urine Screen Negative Screen Negative    Barbituates Urine Screen Negative Screen Negative    Benzodiazepine Urine Screen Positive (A) Screen Negative    Cannabinoids Urine Screen Positive (A) Screen Negative    Cocaine Urine Screen Negative Screen Negative    Fentanyl Qual Urine Screen Positive (A) Screen Negative    Opiates Urine Screen Negative Screen Negative    PCP Urine Screen Negative Screen Negative   Urine Creatinine for Drug Screen Panel   Result Value Ref Range    Creatinine Urine for Drug Screen 44 mg/dL   Activated clotting time celite, POCT   Result Value Ref Range    Activated Clotting Time (Celite) POCT 235 (H) 74 - 150 seconds   CT Head w/o Contrast    Narrative    CT HEAD W/O CONTRAST 12/31/2024 11:19 AM    History: Cardiac Arrest, Anoxic Encephalopathy; post cardiac arrest  day 3; Headache; r/o Subarachnoid hemorrhage; No sudden severe  headache     Comparison: None.    Technique: Using multidetector thin collimation helical acquisition  technique, axial, coronal and sagittal CT images from the skull base  to the vertex were obtained without intravenous contrast.   (topogram) image(s) also obtained and reviewed.    Findings: There is no intracranial hemorrhage, mass effect, or midline  shift. Gray/white matter differentiation in both cerebral hemispheres  is preserved. Ventricles are proportionate to the cerebral sulci. The  basal cisterns are clear. Mild generalized parenchymal volume loss.  Patchy white matter hypoattenuation, most likely represents chronic  small vessel ischemic disease.    The bony calvaria and the bones of the skull base are normal. Mucous  retention cyst in the left maxillary sinus. Mastoid air cells  are  clear.       Impression    Impression: No acute intracranial hemorrhage.     JENNI FAUST MD         SYSTEM ID:  [id number]   CT Chest Abdomen Pelvis w/o Contrast    Narrative    EXAMINATION: CT CHEST ABDOMEN PELVIS W/O CONTRAST, 12/31/2024 11:20 AM    TECHNIQUE: Helical CT images from the thoracic inlet through the  symphysis pubis were obtained with intravenous contrast.     CONTRAST: None.    COMPARISON: Same-day chest radiograph, CT lumbar spine 1/20/2015    HISTORY: Cardiac Arrest: concern for pulmonary edema and possible  abdominal bleeding post procedure    FINDINGS:    Lines and tubes: Endotracheal tube tip terminates in the mid thoracic  trachea. Right femoral approach venous ECMO cannula terminates at the  innominate confluence. Right femoral approach arterial ECMO cannula  terminates at the common iliac bifurcation. Left femoral arterial  catheter terminates in the low abdominal aorta. Left femoral venous  catheter terminates at the origin of the left common iliac vein.    Chest:    Thyroid: Within normal limits.    Mediastinum: Hyperattenuating retrosternal collection measuring 9.2 x  2.6 cm (series 9, image 220), averaging 50 Hounsfield units. The  ascending aorta and pulmonary artery are nondilated. Normal heart  size. No pericardial effusion. Atherosclerotic calcifications of the  aorta and coronary arteries.  No enlarged thoracic lymph nodes by  short axis criteria. Patulous, fluid-filled esophagus.     Lungs: Frothy dependent endotracheal debris extending into the the  mainstem bronchi. No pneumothorax or pleural effusion. No suspicious  nodules. Trace right pleural effusion. Right greater than left  dependent atelectasis. Left lower lobe calcified granuloma.    Abdomen and Pelvis:    Liver: Cirrhotic morphology. No overt focal mass.    Gallbladder/biliary tree: Cholelithiasis without evidence of acute  cholecystitis.     Pancreas: Unremarkable. No ductal dilatation.    Spleen: Within normal  limits.    Adrenal glands: Within normal limits.    Kidneys: Subcentimeter right renal cortical hypodensity, too small to  further characterize, which likely represents a simple cyst. No  hydronephrosis. Contrast opacification of the renal collecting  systems. Nonspecific mild bilateral perinephric stranding.     Pelvis: Layering contrast within the urinary bladder, otherwise  unremarkable.  Prostatic calcifications. No pelvic mass. Pelvic  phleboliths.    Gastrointestinal tract: Normal caliber small and large bowel.     Mesentery/peritoneum/retroperitoneum: No free air. Trace perihepatic  fluid. Mild mesenteric stranding.    Lymph nodes: No significant lymphadenopathy.    Vasculature: Scattered atherosclerotic calcification of abdominal  aorta with no aneurysmal dilation.     Bones and soft tissues: Nondisplaced fractures of the left sixth and  seventh costochondral junctions. Bilateral anterior rib deformities  compatible with known history of chest compressions. Degenerative  changes of the visualized spine. Spinal stimulator leads terminate in  the mid thoracic spine. Mild diffuse anasarca.       Impression    IMPRESSION:  1. Acute nondisplaced fractures of the left sixth and seventh  costochondral junctions with a 9.2 x 2.6 cm retrosternal hematoma.  Limited evaluation for active extravasation on this noncontrast exam.  2. Bilateral anterior rib deformities compatible with known history of  chest compressions.  3. Trace right pleural effusion.    I have personally reviewed the examination and initial interpretation  and I agree with the findings.    MAGGIE PAULINO MD         SYSTEM ID:  [id number]   MRSA MSSA PCR    Specimen: Nares, Bilateral; Swab   Result Value Ref Range    MRSA Target DNA Negative Negative    SA Target DNA Negative     Narrative    The Embly  Xpert SA Nasal Complete assay performed in the [employer] System is a qualitative in vitro diagnostic test designed for rapid detection of  Staphylococcus aureus (SA) and methicillin-resistant Staphylococcus aureus (MRSA) from nasal swabs in patients at risk for nasal colonization. The test utilizes automated real-time polymerase chain reaction (PCR) to detect MRSA/SA DNA. The Xpert SA Nasal Complete assay is intended to aid in the prevention and control of MRSA/SA infections in healthcare settings. The assay is not intended to diagnose, guide or monitor treatment for MRSA/SA infections, or provide results of susceptibility to methicillin. A negative result does not preclude MRSA/SA nasal colonization.    Respiratory Aerobic Bacterial Culture with Gram Stain    Specimen: Endotracheal; Sputum   Result Value Ref Range    Gram Stain Result <25 PMNs/low power field     Gram Stain Result 2+ Mixed lyndsey    Blood Culture Hand, Right    Specimen: Hand, Right; Blood   Result Value Ref Range    Culture No growth after 12 hours    EKG 12-lead, tracing only   Result Value Ref Range    Systolic Blood Pressure  mmHg    Diastolic Blood Pressure  mmHg    Ventricular Rate 61 BPM    Atrial Rate 61 BPM    ME Interval 170 ms    QRS Duration 84 ms     ms    QTc 491 ms    P Axis 12 degrees    R AXIS -23 degrees    T Axis -58 degrees    Interpretation ECG       Sinus rhythm  ST & T wave abnormality, consider lateral ischemia  QTcB >= 480 msec  Abnormal ECG  When compared with ECG of 31-Dec-2024 08:59, (unconfirmed)  Significant changes have occurred     Blood Culture Arm, Right    Specimen: Arm, Right; Blood   Result Value Ref Range    Culture No growth after 12 hours    Echocardiogram Complete   Result Value Ref Range    LVEF  10-15% (severely reduced)     Narrative    [id number]  [id number]  [id number]  [id number]^SYMONE^CUAUHTEMOC     [facility],[place]  Echocardiography Laboratory  [address] [place] [id number]     Name: SUSAN CHENG  MRN: [id number]  : 1960  Study Date: 2024 11:43 AM  Age: 64 yrs  Gender:  Male  Patient Location: [facility]  Reason For Study: Cardiac Arrest, Cardiorespiratory Failure  Ordering Physician: CUAUHTEMOC ASHBY  Performed By: Ronda Delacruz     BSA: 2.1 m2  Height: 70 in  Weight: 194 lb  BP: 142/125 mmHg  ______________________________________________________________________________  Procedure  Echocardiogram with two-dimensional, color and spectral Doppler. Technically  difficult study.Extremely poor acoustic windows.  ______________________________________________________________________________  Interpretation Summary  The patient is on VA-ECMO at 3.8 LPM flow.     Mild concentric wall thickening consistent with left ventricular hypertrophy  is present. Left ventricular function is decreased. The ejection fraction is  10-15% (severely reduced).  Global right ventricular function is severely reduced. The right ventricle is  normal size.  No aortic regurgitation is present.  No pericardial effusion is present.  ______________________________________________________________________________  Left Ventricle  Mild concentric wall thickening consistent with left ventricular hypertrophy  is present. Left ventricular function is decreased. The ejection fraction is  10-15% (severely reduced). Severe diffuse hypokinesis is present.     Right Ventricle  The right ventricle is normal size. Global right ventricular function is  severely reduced.     Mitral Valve  On Doppler interrogation, there is no significant stenosis or regurgitation.  Mitral leaflet thickness is normal .     Aortic Valve  The aortic valve is tricuspid. No aortic regurgitation is present.     Tricuspid Valve  The peak velocity of the tricuspid regurgitant jet is not obtainable.  Pulmonary artery systolic pressure cannot be assessed.     Pulmonic Valve  On Doppler interrogation, there is no significant stenosis or regurgitation.  The valve leaflets are not well visualized.     Vessels  The aorta root is normal. The thoracic  aorta cannot be assessed. ECMO cannula  is present in the IVC.     Pericardium  No pericardial effusion is present.     Compared to Previous Study  This study was compared with the study from 9/7/2021, now s/p cardiac arrest .     ______________________________________________________________________________  MMode/2D Measurements & Calculations  IVSd: 1.4 cm  LVIDd: 4.4 cm  LVIDs: 4.0 cm  LVPWd: 1.4 cm     FS: 10.4 %  LV mass(C)d: 244.0 grams  LV mass(C)dI: 118.4 grams/m2  Ao root diam: 3.6 cm  LVOT diam: 2.2 cm  LVOT area: 3.7 cm2  Ao root diam index Ht(cm/m): 2.0  Ao root diam index BSA (cm/m2): 1.7  RWT: 0.62     ______________________________________________________________________________  Report approved by: Dr Elvis Aragon on 12/31/2024 12:42 PM         XR Chest Port 1 View    Narrative    Portable chest    Indication check endotracheal tube placement and ECLS placement.    COMPARISON: CT chest abdomen pelvis earlier today. Most recent plain  film earlier today.    FINDINGS: Inferior approach ECMO there is in the right innominate  vein. Spinal stimulator leads are present in the lower thoracic spine.  Endotracheal tube tip approximately 4.8 cm above the edy. Mild  streaky prominent densities in the perihilar regions of the lungs.  Bones are osteopenic. Degenerative changes of both shoulders. Heart  size normal.      Impression    IMPRESSION: Support devices placed as described. Mild perihilar  edema/atelectasis.    AYLEEN WATSON MD         SYSTEM ID:  [id number]   XR Abdomen Port 1 View    Narrative    Exam: XR ABDOMEN PORT 1 VIEW 12/31/2024 12:44 PM    Indication: NG placement    Comparison: 12/31/2024    Findings:   Portable supine AP view of the upper abdomen obtained. Spinal cord  stimulator leads project over the lower thoracic spinal canal. The  ECMO cannula extends both field-of-view. Excreted contrast is seen in  the renal collecting systems bilaterally. Gaseous distention of the  stomach.  Otherwise gasless upper abdomen. No pneumatosis or portal  venous gas appreciated. Asymmetric elevation the right hemidiaphragm  is unchanged. No new basilar opacities.        Impression    Impression:   No gastric tube in the field-of-view.    MARYLOU LINDO MD         SYSTEM ID:  [id number]   Activated clotting time celite, POCT   Result Value Ref Range    Activated Clotting Time (Celite) POCT 186 (H) 74 - 150 seconds   Troponin T, High Sensitivity   Result Value Ref Range    Troponin T, High Sensitivity 3,910 (HH) <=22 ng/L   Hemoglobin A1c   Result Value Ref Range    Estimated Average Glucose 117 (H) <117 mg/dL    Hemoglobin A1C 5.7 (H) <5.7 %   Blood gas arterial   Result Value Ref Range    pH Arterial 7.47 (H) 7.35 - 7.45    pCO2 Arterial 28 (L) 35 - 45 mm Hg    pO2 Arterial 317 (H) 80 - 105 mm Hg    FIO2 90     Bicarbonate Arterial 21 21 - 28 mmol/L    Base Excess/Deficit Arterial -2.2 -3.0 - 3.0 mmol/L    Elías's Test Artline     Oxyhemoglobin Arterial 99 92 - 100 %    O2 Sat, Arterial >100.0 (H) 96.0 - 97.0 %    Narrative    In healthy individuals, oxyhemoglobin (O2Hb) and oxygen saturation (SO2) are approximately equal. In the presence of dyshemoglobins, oxyhemoglobin can be considerably lower than oxygen saturation.   Glucose by meter   Result Value Ref Range    GLUCOSE BY METER POCT 158 (H) 70 - 99 mg/dL   US Lower Extremity Arterial Duplex Bilateral    Narrative    Exam: Duplex ultrasound of bilateral lower extremity arteries dated  12/31/2024 3:20 PM     Clinical information: Baseline to assess blood flow to Lower  Extremities (VA ECMO)     Comparison: 5/3/2024    Technique: Grayscale (B-mode), color Doppler, and duplex spectral  Doppler ultrasound of the lower extremity arteries. Velocity  measurements obtained with angle correction of 60 degrees or less.    Ordering provider: CUAUHTEMOC ASHBY    Findings:     Right lower extremity:     Right groin ECMO cannula obscuring evaluation of the right  upper  femoral arteries.     Distal SFA: Velocity: 46 cm/sec. Waveforms: Monophasic    Popliteal artery: 38 cm/s, monophasic waveforms    Below-knee amputation.     Left lower extremity:    Catheter limiting evaluation of the left groin arteries    Proximal SFA: Velocity: 64 cm/sec. Waveforms: Monophasic  Mid SFA: Velocity: 81 cm/sec. Waveforms: Monophasic  Distal SFA: Velocity: 85 cm/sec. Waveforms: Monophasic    Popliteal artery: 45 cm/s, monophasic waveform    PTA ankle: Velocity: 51 cm/sec. Waveforms: Monophasic waveform  RUDOLPH ankle: Velocity: 72 cm/sec. Waveforms: Monophasic      Impression    Impression:     1. Right leg:  ECMO cannulae limiting evaluation of the common  femoral, deep femoral and upper superficial femoral arteries. Right  below-knee amputation. Patent right superficial femoral artery in the  distal thigh and patent right popliteal artery, both of these arteries  show monophasic dampened waveform.    2. Left leg: Catheter limiting evaluation of the left groin arteries.   Patent left lower extremity arterial vasculature, with monophasic  waveforms.    WES JAMES MD         SYSTEM ID:  [id number]   US Carotid Bilateral    Narrative    Exam: Bilateral carotid duplex Doppler ultrasound dated 12/31/2024  3:21 PM    Clinical history: Cardiac Arrest on ECMO    Comparison Study: Ultrasound 9/16/2021    Technique: Grayscale (B-mode) and duplex and spectral Doppler  ultrasound of the extracranial internal carotid, external carotid,  vertebral artery origins, right brachiocephalic/subclavian and left  subclavian arteries. Velocity measurements obtained with angle  correction at or less than 60 degrees.    Findings:    Right side:     Plaque Morphology: Predominantly echogenic, Smooth       Proximal CCA: 60 cm/sec     Mid CCA: 69 cm/sec     Distal CCA: 71 cm/sec     External CA: 85 cm/sec       Proximal ICA: 66 cm/sec     Mid ICA: 54 cm/sec     Distal ICA: 43 cm/sec       Vertebral artery: 41  cm/sec    ICA/CCA ratio: 0.76    Left side:     Plaque Morphology: Predominantly echogenic, Irregular       Proximal CCA: 67 cm/sec     Mid CCA: 63 cm/sec     Distal CCA: 72 cm/sec     External CA: 57 cm/sec       Proximal ICA: 83 cm/sec     Mid ICA: 43 cm/sec     Distal ICA: 54 cm/sec       Vertebral artery: 43 cm/sec     ICA/CCA ratio: 1.15      Impression    Impression:    1. Right side:        Degree of stenosis of the internal carotid artery: Less than 50 %.  .    2. Left side:         Degree of stenosis of the internal carotid artery: Less than 50 %.  .    [facility] Updated Recommendations for  Carotid Stenosis Interpretation Criteria - October 2021.  [email or web address]  ox-Onmqiyjavygvx_Ygzgsoq-Ugfnbzjneqvwbkz-for-Carotid-Stenosis-Interpre  ation-Criteria.pdf    Society for Vascular Surgery Clinical Practice Guidelines for  Management of Extracranial Cerebrovascular Disease. Journal of  Vascular Surgery Vol. 75, Issue 1, Supplement [id number] Published  online: June 18, 2021 (additional criteria adjustment for >70% ICA  stenosis)         Normal            ICA PSV: < 180 cm/s            Plaque: None            ICA/CCA PSV Ratio: < 2.0            ICA EDV: < 40 cm/s         < 50%            ICA PSV: < 180 cm/s            Plaque: < 50%            ICA/CCA PSV Ratio: < 2.0            ICA EDV: < 40 cm/s         50 - 69%            ICA PSV: < 180 - 230 cm/s            ICA/CCA PSV Ratio: 2.0 - 4.0            ICA EDV: 40 - 100 cm/s         > 70%            ICA PSV: > 230 cm/s AND             ICA/CCA PSV Ratio: > 4.0 or ICA EDV: > 100 cm/s         Total occlusion            ICA PSV: Undetectable            ICA/CCA PSV Ratio: N/A            ICA EDV: N/A    I have personally reviewed the examination and initial interpretation  and I agree with the findings.    WES JAMES MD         SYSTEM ID:  [id number]   US Lower Extremity Venous Mapping  Bilateral    Narrative    Exam: Ultrasound Doppler vein mapping of bilateral lower extremities  dated  12/31/2024 3:21 PM    Comparison study:    Clinical history:    Ordering clinician:    Technique: Grayscale (B-mode) with duplex Doppler ultrasound, along  with compression and augmentation, of the lower extremity veins.    RIGHT LEG:    CFV: Thrombus: No    GSV:  SFJ: Thrombus: Nonvisualized due to ECMO catheters/bandages  Proximal thigh: Nonvisualized due to ECMO catheters/bandages  Mid thigh: Thrombus: No, Wall thickness: Within normal limits, 3.3 mm  Distal thigh: Thrombus: No, Wall thickness: Within normal limits, 3.4  mm  Knee: Thrombus: No, Wall thickness: Within normal limits, 2.5 mm  *Below-knee amputation    LEFT LEG:    CFV: Thrombus: No    GSV:  SFJ: Thrombus: Nonvisualized due to catheter  Proximal thigh: nonvisualized due to catheter  Mid thigh: Thrombus: No, Wall thickness: Within normal limits, 2.5 mm  Distal thigh: Thrombus: No, Wall thickness: Within normal limits, 3.1  mm  Knee: Thrombus: No, Wall thickness: Within normal limits, 3.3 mm  Superior calf: Thrombus: No, Wall thickness: Within normal limits, 2  mm  Mid calf: Thrombus: No, Wall thickness: Within normal limits, 2.3 mm  Distal calf: Thrombus: No, Wall thickness: Within normal limits, 2.1  mm      Impression    Impression:   1. Venous mapping measurements as per above.  2. No thrombus identified in either extremity.    I have personally reviewed the examination and initial interpretation  and I agree with the findings.    WSE JAMES MD         SYSTEM ID:  [id number]   Blood gas arterial   Result Value Ref Range    pH Arterial 7.37 7.35 - 7.45    pCO2 Arterial 39 35 - 45 mm Hg    pO2 Arterial 204 (H) 80 - 105 mm Hg    FIO2 60     Bicarbonate Arterial 22 21 - 28 mmol/L    Base Excess/Deficit Arterial -2.8 -3.0 - 3.0 mmol/L    Elías's Test Artline     Oxyhemoglobin Arterial 99 92 - 100 %    O2 Sat, Arterial 100.0 (H) 96.0 - 97.0 %     Narrative    In healthy individuals, oxyhemoglobin (O2Hb) and oxygen saturation (SO2) are approximately equal. In the presence of dyshemoglobins, oxyhemoglobin can be considerably lower than oxygen saturation.   Blood gas ELS arterial   Result Value Ref Range    pH ELS Arterial 7.28 (L) 7.35 - 7.45    pCO2 ELS Arterial 52 (H) 35 - 45 mm Hg    pO2 ELS Arterial 150 (H) 80 - 105 mm Hg    Bicarbonate ELS Arterial 25 21 - 28 mmol/L    Base Excess/Deficit Arterial -2.6 -3.0 - 3.0 mmol/L    FIO2 60     Oxyhemoglobin ELS Arterial 98 75 - 100 %    O2 Saturation ELS Arterial >100.0 (H) 96.0 - 97.0 %    Narrative    In healthy individuals, oxyhemoglobin (O2Hb) and oxygen saturation (SO2) are approximately equal. In the presence of dyshemoglobins, oxyhemoglobin can be considerably lower than oxygen saturation.   Blood gas ELS venous   Result Value Ref Range    pH ELS Venous 7.29 (L) 7.32 - 7.43    pCO2 ELS Venous 52 (H) 40 - 50 mm Hg    pO2 ELS Venous 53 (H) 25 - 47 mm Hg    Bicarbonate ELS Venous 25 21 - 28 mmol/L    Base Excess/Deficit Venous -2.1 -3.0 - 3.0 mmol/L    FIO2 60     Oxyhemoglobin ELS Venous 81 %    O2 Saturation ELS Venous 82.2 (H) 70.0 - 75.0 %    Narrative    In healthy individuals, oxyhemoglobin (O2Hb) and oxygen saturation (SO2) are approximately equal. In the presence of dyshemoglobins, oxyhemoglobin can be considerably lower than oxygen saturation.   Fibrinogen activity   Result Value Ref Range    Fibrinogen Activity 353 170 - 510 mg/dL   Glucose by meter   Result Value Ref Range    GLUCOSE BY METER POCT 112 (H) 70 - 99 mg/dL   Activated clotting time celite, POCT   Result Value Ref Range    Activated Clotting Time (Celite) POCT 166 (H) 74 - 150 seconds   XR Abdomen Port 1 View    Narrative    Exam: XR ABDOMEN PORT 1 VIEW, 12/31/2024 4:33 PM    Indication: OG tube placement    Comparison: 12/31/2024    Findings:   Portable supine AP view the abdomen obtained. The gastric tube tip and  sidehole project over  the stomach. The previously seen gastric  distention has resolved. The ECMO cannula extends above the  field-of-view. Stable position of the spinal cord stimulator leads.  Persistent excreted intravenous contrast in the renal collecting  systems bilaterally. Unchanged mild asymmetric elevation the right  hemidiaphragm.      Impression    Impression:   The gastric tube tip and sidehole project over the stomach, which now  appears decompressed.    MARYLOU LINDO MD         SYSTEM ID:  [id number]   Blood gas arterial   Result Value Ref Range    pH Arterial 7.38 7.35 - 7.45    pCO2 Arterial 40 35 - 45 mm Hg    pO2 Arterial 181 (H) 80 - 105 mm Hg    FIO2 50     Bicarbonate Arterial 23 21 - 28 mmol/L    Base Excess/Deficit Arterial -1.6 -3.0 - 3.0 mmol/L    Elías's Test Artline     Oxyhemoglobin Arterial 98 92 - 100 %    O2 Sat, Arterial 99.8 (H) 96.0 - 97.0 %    Narrative    In healthy individuals, oxyhemoglobin (O2Hb) and oxygen saturation (SO2) are approximately equal. In the presence of dyshemoglobins, oxyhemoglobin can be considerably lower than oxygen saturation.   CBC with platelets   Result Value Ref Range    WBC Count 8.8 4.0 - 11.0 10e3/uL    RBC Count 3.64 (L) 4.40 - 5.90 10e6/uL    Hemoglobin 10.8 (L) 13.3 - 17.7 g/dL    Hematocrit 32.0 (L) 40.0 - 53.0 %    MCV 88 78 - 100 fL    MCH 29.7 26.5 - 33.0 pg    MCHC 33.8 31.5 - 36.5 g/dL    RDW 12.6 10.0 - 15.0 %    Platelet Count 129 (L) 150 - 450 10e3/uL   Comprehensive metabolic panel   Result Value Ref Range    Sodium 142 135 - 145 mmol/L    Potassium 2.9 (L) 3.4 - 5.3 mmol/L    Carbon Dioxide (CO2) 22 22 - 29 mmol/L    Anion Gap 10 7 - 15 mmol/L    Urea Nitrogen 5.3 (L) 8.0 - 23.0 mg/dL    Creatinine 0.59 (L) 0.67 - 1.17 mg/dL    GFR Estimate >90 >60 mL/min/1.73m2    Calcium 8.1 (L) 8.8 - 10.4 mg/dL    Chloride 110 (H) 98 - 107 mmol/L    Glucose 95 70 - 99 mg/dL    Alkaline Phosphatase 97 40 - 150 U/L     (H) 0 - 45 U/L    ALT 23 0 - 70 U/L    Protein  Total 5.4 (L) 6.4 - 8.3 g/dL    Albumin 3.2 (L) 3.5 - 5.2 g/dL    Bilirubin Total 0.4 <=1.2 mg/dL   Calcium ionized whole blood   Result Value Ref Range    Calcium Ionized Whole Blood 4.5 4.4 - 5.2 mg/dL   Glucose whole blood   Result Value Ref Range    Glucose 92 70 - 99 mg/dL   Heparin Unfractionated Anti Xa Level   Result Value Ref Range    Anti Xa Unfractionated Heparin 0.29 For Reference Range, See Comment IU/mL    Narrative    Therapeutic Range: UFH: 0.25-0.50 IU/mL for low intensity dosing,  0.30-0.70 IU/mL for high intensity dosing DVT and PE.  This test is not validated for other direct factor X inhibitors (e.g. rivaroxaban, apixaban, edoxaban, betrixaban, fondaparinux) and should not be used for monitoring of other medications.   INR   Result Value Ref Range    INR 1.23 (H) 0.85 - 1.15   Partial thromboplastin time   Result Value Ref Range    aPTT 63 (H) 22 - 38 Seconds   Lactic acid whole blood   Result Value Ref Range    Lactic Acid 0.3 (L) 0.7 - 2.0 mmol/L   Magnesium   Result Value Ref Range    Magnesium 1.5 (L) 1.7 - 2.3 mg/dL   Troponin T, High Sensitivity   Result Value Ref Range    Troponin T, High Sensitivity 5,285 (HH) <=22 ng/L   D dimer quantitative   Result Value Ref Range    D-Dimer Quantitative 2.95 (H) 0.00 - 0.50 ug/mL FEU    Narrative    This D-dimer assay is intended for use in conjunction with a clinical pretest probability assessment model to exclude pulmonary embolism (PE) and deep venous thrombosis (DVT) in outpatients suspected of PE or DVT. The cut-off value is 0.50 ug/mL FEU.    For patients 50 years of age or older, the application of age-adjusted cut-off values for D-Dimer may increase the specificity without significant effect on sensitivity. The literature suggested calculation age adjusted cut-off in ug/L = age in years x 10 ug/L. The results in this laboratory are reported as ug/mL rather than ug/L. The calculation for age adjusted cut off in ug/mL= age in years x 0.01  ug/mL. For example, the cut off for a 76 year old male is 76 x 0.01 ug/mL = 0.76 ug/mL (760 ug/L).    M [address] et al. Age adjusted D-dimer cut-off levels to rule out pulmonary embolism: The ADJUST-PE Study. SEYMOUR 2014;311:0440-5920.; HJ Ingrid et al. Diagnostic accuracy of conventional or age adjusted D-dimer cutoff values in older patients with suspected venous thromboembolism. Systemic review and meta-analysis. BMJ 2013:346:f2492.   Glucose by meter   Result Value Ref Range    GLUCOSE BY METER POCT 86 70 - 99 mg/dL   Activated clotting time celite, POCT   Result Value Ref Range    Activated Clotting Time (Celite) POCT 154 (H) 74 - 150 seconds   Blood gas arterial   Result Value Ref Range    pH Arterial 7.34 (L) 7.35 - 7.45    pCO2 Arterial 42 35 - 45 mm Hg    pO2 Arterial 127 (H) 80 - 105 mm Hg    FIO2 40     Bicarbonate Arterial 23 21 - 28 mmol/L    Base Excess/Deficit Arterial -3.0 -3.0 - 3.0 mmol/L    Elías's Test Artline     Oxyhemoglobin Arterial 98 92 - 100 %    O2 Sat, Arterial >100.0 (H) 96.0 - 97.0 %    Peep 8 cm H2O    Narrative    In healthy individuals, oxyhemoglobin (O2Hb) and oxygen saturation (SO2) are approximately equal. In the presence of dyshemoglobins, oxyhemoglobin can be considerably lower than oxygen saturation.   CBC with platelets   Result Value Ref Range    WBC Count 7.8 4.0 - 11.0 10e3/uL    RBC Count 3.53 (L) 4.40 - 5.90 10e6/uL    Hemoglobin 10.8 (L) 13.3 - 17.7 g/dL    Hematocrit 30.9 (L) 40.0 - 53.0 %    MCV 88 78 - 100 fL    MCH 30.6 26.5 - 33.0 pg    MCHC 35.0 31.5 - 36.5 g/dL    RDW 13.1 10.0 - 15.0 %    Platelet Count 124 (L) 150 - 450 10e3/uL   Comprehensive metabolic panel   Result Value Ref Range    Sodium 141 135 - 145 mmol/L    Potassium 4.0 3.4 - 5.3 mmol/L    Carbon Dioxide (CO2) 20 (L) 22 - 29 mmol/L    Anion Gap 12 7 - 15 mmol/L    Urea Nitrogen 5.2 (L) 8.0 - 23.0 mg/dL    Creatinine 0.66 (L) 0.67 - 1.17 mg/dL    GFR Estimate >90 >60 mL/min/1.73m2    Calcium 8.1 (L)  8.8 - 10.4 mg/dL    Chloride 109 (H) 98 - 107 mmol/L    Glucose 130 (H) 70 - 99 mg/dL    Alkaline Phosphatase 94 40 - 150 U/L     (H) 0 - 45 U/L    ALT 22 0 - 70 U/L    Protein Total 5.3 (L) 6.4 - 8.3 g/dL    Albumin 3.2 (L) 3.5 - 5.2 g/dL    Bilirubin Total 0.4 <=1.2 mg/dL   Calcium ionized whole blood   Result Value Ref Range    Calcium Ionized Whole Blood 4.6 4.4 - 5.2 mg/dL   Glucose whole blood   Result Value Ref Range    Glucose 126 (H) 70 - 99 mg/dL   Heparin Unfractionated Anti Xa Level   Result Value Ref Range    Anti Xa Unfractionated Heparin 0.17 For Reference Range, See Comment IU/mL    Narrative    Therapeutic Range: UFH: 0.25-0.50 IU/mL for low intensity dosing,  0.30-0.70 IU/mL for high intensity dosing DVT and PE.  This test is not validated for other direct factor X inhibitors (e.g. rivaroxaban, apixaban, edoxaban, betrixaban, fondaparinux) and should not be used for monitoring of other medications.   INR   Result Value Ref Range    INR 1.20 (H) 0.85 - 1.15   Partial thromboplastin time   Result Value Ref Range    aPTT 61 (H) 22 - 38 Seconds   Lactic acid whole blood   Result Value Ref Range    Lactic Acid 0.4 (L) 0.7 - 2.0 mmol/L   Magnesium   Result Value Ref Range    Magnesium 2.5 (H) 1.7 - 2.3 mg/dL   Troponin T, High Sensitivity   Result Value Ref Range    Troponin T, High Sensitivity 4,891 (HH) <=22 ng/L   Phosphorus   Result Value Ref Range    Phosphorus 3.8 2.5 - 4.5 mg/dL   Potassium   Result Value Ref Range    Potassium 4.0 3.4 - 5.3 mmol/L   Activated clotting time celite, POCT   Result Value Ref Range    Activated Clotting Time (Celite) POCT 166 (H) 74 - 150 seconds   Blood gas arterial   Result Value Ref Range    pH Arterial 7.34 (L) 7.35 - 7.45    pCO2 Arterial 42 35 - 45 mm Hg    pO2 Arterial 107 (H) 80 - 105 mm Hg    FIO2 40     Bicarbonate Arterial 23 21 - 28 mmol/L    Base Excess/Deficit Arterial -2.9 -3.0 - 3.0 mmol/L    Elías's Test Artline     Oxyhemoglobin Arterial 97 92  - 100 %    O2 Sat, Arterial 99.5 (H) 96.0 - 97.0 %    Peep 8 cm H2O    Narrative    In healthy individuals, oxyhemoglobin (O2Hb) and oxygen saturation (SO2) are approximately equal. In the presence of dyshemoglobins, oxyhemoglobin can be considerably lower than oxygen saturation.   Glucose by meter   Result Value Ref Range    GLUCOSE BY METER POCT 114 (H) 70 - 99 mg/dL   Activated clotting time celite, POCT   Result Value Ref Range    Activated Clotting Time (Celite) POCT 162 (H) 74 - 150 seconds   Blood gas arterial   Result Value Ref Range    pH Arterial 7.34 (L) 7.35 - 7.45    pCO2 Arterial 42 35 - 45 mm Hg    pO2 Arterial 119 (H) 80 - 105 mm Hg    FIO2 40     Bicarbonate Arterial 23 21 - 28 mmol/L    Base Excess/Deficit Arterial -2.6 -3.0 - 3.0 mmol/L    Elías's Test Artline     Oxyhemoglobin Arterial 98 92 - 100 %    O2 Sat, Arterial 100.0 (H) 96.0 - 97.0 %    Peep 8 cm H2O    Narrative    In healthy individuals, oxyhemoglobin (O2Hb) and oxygen saturation (SO2) are approximately equal. In the presence of dyshemoglobins, oxyhemoglobin can be considerably lower than oxygen saturation.   Glucose by meter   Result Value Ref Range    GLUCOSE BY METER POCT 109 (H) 70 - 99 mg/dL   Blood gas arterial   Result Value Ref Range    pH Arterial 7.34 (L) 7.35 - 7.45    pCO2 Arterial 42 35 - 45 mm Hg    pO2 Arterial 59 (L) 80 - 105 mm Hg    FIO2 60     Bicarbonate Arterial 23 21 - 28 mmol/L    Base Excess/Deficit Arterial -2.9 -3.0 - 3.0 mmol/L    Elías's Test Artline     Oxyhemoglobin Arterial 88 (L) 92 - 100 %    O2 Sat, Arterial 90.5 (L) 96.0 - 97.0 %    Peep 8 cm H2O    Narrative    In healthy individuals, oxyhemoglobin (O2Hb) and oxygen saturation (SO2) are approximately equal. In the presence of dyshemoglobins, oxyhemoglobin can be considerably lower than oxygen saturation.   Activated clotting time celite, POCT   Result Value Ref Range    Activated Clotting Time (Celite) POCT 174 (H) 74 - 150 seconds   Activated  clotting time celite, POCT   Result Value Ref Range    Activated Clotting Time (Celite) POCT 178 (H) 74 - 150 seconds   Potassium   Result Value Ref Range    Potassium 3.4 3.4 - 5.3 mmol/L   Blood gas ELS arterial   Result Value Ref Range    pH ELS Arterial 7.29 (L) 7.35 - 7.45    pCO2 ELS Arterial 50 (H) 35 - 45 mm Hg    pO2 ELS Arterial 181 (H) 80 - 105 mm Hg    Bicarbonate ELS Arterial 24 21 - 28 mmol/L    Base Excess/Deficit Arterial -3.0 -3.0 - 3.0 mmol/L    FIO2 70     Oxyhemoglobin ELS Arterial 98 75 - 100 %    O2 Saturation ELS Arterial >100.0 (H) 96.0 - 97.0 %    Narrative    In healthy individuals, oxyhemoglobin (O2Hb) and oxygen saturation (SO2) are approximately equal. In the presence of dyshemoglobins, oxyhemoglobin can be considerably lower than oxygen saturation.   Blood gas ELS venous   Result Value Ref Range    pH ELS Venous 7.29 (L) 7.32 - 7.43    pCO2 ELS Venous 50 40 - 50 mm Hg    pO2 ELS Venous 56 (H) 25 - 47 mm Hg    Bicarbonate ELS Venous 24 21 - 28 mmol/L    Base Excess/Deficit Venous -2.7 -3.0 - 3.0 mmol/L    FIO2 60     Oxyhemoglobin ELS Venous 86 %    O2 Saturation ELS Venous 87.6 (H) 70.0 - 75.0 %    Narrative    In healthy individuals, oxyhemoglobin (O2Hb) and oxygen saturation (SO2) are approximately equal. In the presence of dyshemoglobins, oxyhemoglobin can be considerably lower than oxygen saturation.   Fibrinogen activity   Result Value Ref Range    Fibrinogen Activity 387 170 - 510 mg/dL   CBC with platelets   Result Value Ref Range    WBC Count 7.3 4.0 - 11.0 10e3/uL    RBC Count 3.63 (L) 4.40 - 5.90 10e6/uL    Hemoglobin 10.7 (L) 13.3 - 17.7 g/dL    Hematocrit 32.2 (L) 40.0 - 53.0 %    MCV 89 78 - 100 fL    MCH 29.5 26.5 - 33.0 pg    MCHC 33.2 31.5 - 36.5 g/dL    RDW 12.9 10.0 - 15.0 %    Platelet Count 109 (L) 150 - 450 10e3/uL   Comprehensive metabolic panel   Result Value Ref Range    Sodium 141 135 - 145 mmol/L    Potassium 3.4 3.4 - 5.3 mmol/L    Carbon Dioxide (CO2) 21 (L)  22 - 29 mmol/L    Anion Gap 11 7 - 15 mmol/L    Urea Nitrogen 5.1 (L) 8.0 - 23.0 mg/dL    Creatinine 0.62 (L) 0.67 - 1.17 mg/dL    GFR Estimate >90 >60 mL/min/1.73m2    Calcium 8.1 (L) 8.8 - 10.4 mg/dL    Chloride 109 (H) 98 - 107 mmol/L    Glucose 149 (H) 70 - 99 mg/dL    Alkaline Phosphatase 98 40 - 150 U/L     (H) 0 - 45 U/L    ALT 24 0 - 70 U/L    Protein Total 5.6 (L) 6.4 - 8.3 g/dL    Albumin 3.3 (L) 3.5 - 5.2 g/dL    Bilirubin Total 0.5 <=1.2 mg/dL   Calcium ionized whole blood   Result Value Ref Range    Calcium Ionized Whole Blood 4.7 4.4 - 5.2 mg/dL   Glucose whole blood   Result Value Ref Range    Glucose 145 (H) 70 - 99 mg/dL   Heparin Unfractionated Anti Xa Level   Result Value Ref Range    Anti Xa Unfractionated Heparin 0.51 For Reference Range, See Comment IU/mL    Narrative    Therapeutic Range: UFH: 0.25-0.50 IU/mL for low intensity dosing,  0.30-0.70 IU/mL for high intensity dosing DVT and PE.  This test is not validated for other direct factor X inhibitors (e.g. rivaroxaban, apixaban, edoxaban, betrixaban, fondaparinux) and should not be used for monitoring of other medications.   INR   Result Value Ref Range    INR 1.20 (H) 0.85 - 1.15   Partial thromboplastin time   Result Value Ref Range    aPTT 119 (H) 22 - 38 Seconds   Lactic acid whole blood   Result Value Ref Range    Lactic Acid 0.5 (L) 0.7 - 2.0 mmol/L   Magnesium   Result Value Ref Range    Magnesium 2.1 1.7 - 2.3 mg/dL   Troponin T, High Sensitivity   Result Value Ref Range    Troponin T, High Sensitivity 4,252 (HH) <=22 ng/L   D dimer quantitative   Result Value Ref Range    D-Dimer Quantitative 2.13 (H) 0.00 - 0.50 ug/mL FEU    Narrative    This D-dimer assay is intended for use in conjunction with a clinical pretest probability assessment model to exclude pulmonary embolism (PE) and deep venous thrombosis (DVT) in outpatients suspected of PE or DVT. The cut-off value is 0.50 ug/mL FEU.    For patients 50 years of age or older,  the application of age-adjusted cut-off values for D-Dimer may increase the specificity without significant effect on sensitivity. The literature suggested calculation age adjusted cut-off in ug/L = age in years x 10 ug/L. The results in this laboratory are reported as ug/mL rather than ug/L. The calculation for age adjusted cut off in ug/mL= age in years x 0.01 ug/mL. For example, the cut off for a 76 year old male is 76 x 0.01 ug/mL = 0.76 ug/mL (760 ug/L).    M [address] et al. Age adjusted D-dimer cut-off levels to rule out pulmonary embolism: The ADJUST-PE Study. SEYMOUR 2014;311:8444-5769.; HJ Ingrid et al. Diagnostic accuracy of conventional or age adjusted D-dimer cutoff values in older patients with suspected venous thromboembolism. Systemic review and meta-analysis. BMJ 2013:346:f2492.   CRP inflammation   Result Value Ref Range    CRP Inflammation 47.00 (H) <5.00 mg/L   Erythrocyte sedimentation rate auto   Result Value Ref Range    Erythrocyte Sedimentation Rate 20 0 - 20 mm/hr   Hemoglobin plasma   Result Value Ref Range    Hemoglobin Plasma 50 (H) <30 mg/dL   Lactate Dehydrogenase   Result Value Ref Range    Lactate Dehydrogenase 591 (H) 0 - 250 U/L   Phosphorus   Result Value Ref Range    Phosphorus 3.2 2.5 - 4.5 mg/dL   TSH   Result Value Ref Range    TSH 0.49 0.30 - 4.20 uIU/mL   T3 Free   Result Value Ref Range    T3 Free 2.1 2.0 - 4.4 pg/mL   T3 total   Result Value Ref Range    T3 Total 74 (L) 85 - 202 ng/dL   T4 free   Result Value Ref Range    Free T4 1.32 0.90 - 1.70 ng/dL   Blood gas arterial   Result Value Ref Range    pH Arterial 7.36 7.35 - 7.45    pCO2 Arterial 40 35 - 45 mm Hg    pO2 Arterial 101 80 - 105 mm Hg    FIO2 60     Bicarbonate Arterial 22 21 - 28 mmol/L    Base Excess/Deficit Arterial -3.0 -3.0 - 3.0 mmol/L    Elías's Test Artline     Oxyhemoglobin Arterial 97 92 - 100 %    O2 Sat, Arterial 98.4 (H) 96.0 - 97.0 %    Peep 8 cm H2O    Narrative    In healthy individuals,  oxyhemoglobin (O2Hb) and oxygen saturation (SO2) are approximately equal. In the presence of dyshemoglobins, oxyhemoglobin can be considerably lower than oxygen saturation.   EKG 12-lead, tracing only   Result Value Ref Range    Systolic Blood Pressure  mmHg    Diastolic Blood Pressure  mmHg    Ventricular Rate 82 BPM    Atrial Rate 82 BPM    SD Interval 152 ms    QRS Duration 86 ms     ms    QTc 626 ms    P Axis 37 degrees    R AXIS 4 degrees    T Axis -82 degrees    Interpretation ECG       Sinus rhythm  Cannot rule out Anterior infarct , age undetermined  ST & T wave abnormality, consider inferolateral ischemia  Abnormal ECG  When compared with ECG of 31-Dec-2024 11:24, (unconfirmed)  QT has lengthened     Blood gas arterial   Result Value Ref Range    pH Arterial 7.36 7.35 - 7.45    pCO2 Arterial 38 35 - 45 mm Hg    pO2 Arterial 104 80 - 105 mm Hg    FIO2 60     Bicarbonate Arterial 22 21 - 28 mmol/L    Base Excess/Deficit Arterial -3.5 (L) -3.0 - 3.0 mmol/L    Elías's Test Artline     Oxyhemoglobin Arterial 97 92 - 100 %    O2 Sat, Arterial 99.1 (H) 96.0 - 97.0 %    Peep 8 cm H2O    Narrative    In healthy individuals, oxyhemoglobin (O2Hb) and oxygen saturation (SO2) are approximately equal. In the presence of dyshemoglobins, oxyhemoglobin can be considerably lower than oxygen saturation.   Glucose by meter   Result Value Ref Range    GLUCOSE BY METER POCT 164 (H) 70 - 99 mg/dL     *Note: Due to a large number of results and/or encounters for the requested time period, some results have not been displayed. A complete set of results can be found in Results Review.

## 2025-01-01 NOTE — PROGRESS NOTES
[facility]    ECLS Shift Summary:     ECMO Equipment:  Console Serial Number: [id number]  Circuit Lot Number: [id number]  Oxygenator Lot Number: [id number]          Circuit Assessment: Fibrin  Fibrin Location: connectors    Arterial ECMO Cannula: 17 Fr in the Right Femoral Artery  Venous ECMO Cannula: 25 Fr in the Right Femoral Vein  Distal Perfusion Catheter: 8 Fr in the Right Superficial Femoral Artery    ECMO Cannula Venous Right femoral vein-Site Assessment: WDL  ECMO Cannula Arterial Right femoral artery-Site Assessment: WDL  Distal Perfusion Catheter Right superficial femoral artery-Site Assessment: WDL  ECMO Cannula Venous Right femoral vein-Site Intervention: No intervention needed  Distal Perfusion Catheter Right superficial femoral artery-Site Intervention: No intervention needed    Patient remains on V-A ECMO, all equipment is functioning and alarms are appropriately set. RPM's: (S) 3700 (HTN) with Blood Flow (Circuit) LPM  Av.4 LPM  Min: 3.93 LPM  Max: 4.74 LPM L/min. Sweep is at (S) 1 LPM and 70 %. Extremities are warm.     Significant Shift Events: mid shift after turn pt had hypoxic event, flows increased, FIO2 increased on both vent and ECMO. Pt began to north south flows decreased and pt position changed. SATS slowly recovered. Later in shift pt HTN 's flows decreased until NIPRIDE started, then flows returned to 4.7    Vent settings:  FiO2 (%): 50 %, Resp: 15, Vent Mode: CMV/AC, Resp Rate (Set): 14 breaths/min, Tidal Volume (Set, mL): 430 mL, PEEP (cm H2O): 8 cmH2O, Resp Rate (Set): 14 breaths/min, Tidal Volume (Set, mL): 430 mL, PEEP (cm H2O): 8 cmH2O    Anticoagulation:  Dose (units/hr) HEParin: 1800 Units/hr  Rate (mL/hr) HEParin: 18 mL/hr  Concentration HEParin: 100 Units/mL        Most recent: ACT  (seconds): 178 seconds    Urine output is clear cory w/o foul odor, refer to RN I&O's  Blood loss was minimal. No product given.      Intake/Output Summary (Last 24 hours) at 1/1/2025 0554  Last data filed at 1/1/2025 0500  Gross per 24 hour   Intake 2790.63 ml   Output 1410 ml   Net 1380.63 ml       Labs:  Recent Labs   Lab 01/01/25  0428 01/01/25  0417 01/01/25  0200 01/01/25  0004 12/31/24  2216   PH 7.36  --  7.34* 7.34* 7.34*   PCO2 40  --  42 42 42   PO2 101  --  59* 119* 107*   HCO3 22  --  23 23 23   O2PER 60 60  70 60 40 40       Lab Results   Component Value Date    HGB 10.7 (L) 01/01/2025    PHGB 50 (H) 01/01/2025     (L) 01/01/2025    FIBR 353 12/31/2024    INR 1.20 (H) 12/31/2024    PTT 61 (H) 12/31/2024    DD 2.95 (H) 12/31/2024       Plan is to remain on V-A ECMO.    Marylou Shelby RN  ECMO Specialist  1/1/2025 5:54 AM

## 2025-01-02 ENCOUNTER — APPOINTMENT (OUTPATIENT)
Dept: GENERAL RADIOLOGY | Facility: CLINIC | Age: 65
DRG: 003 | End: 2025-01-02
Attending: NURSE PRACTITIONER
Payer: MEDICARE

## 2025-01-02 ENCOUNTER — APPOINTMENT (OUTPATIENT)
Dept: GENERAL RADIOLOGY | Facility: CLINIC | Age: 65
DRG: 003 | End: 2025-01-02
Payer: MEDICARE

## 2025-01-02 DIAGNOSIS — I70.228 ATHEROSCLEROSIS OF NATIVE ARTERIES OF EXTREMITIES WITH REST PAIN, OTHER EXTREMITY (H): Primary | ICD-10-CM

## 2025-01-02 LAB
ABO + RH BLD: NORMAL
ACT BLD: 178 SECONDS (ref 74–150)
ACT BLD: 182 SECONDS (ref 74–150)
ACT BLD: 182 SECONDS (ref 74–150)
ACT BLD: 194 SECONDS (ref 74–150)
ALBUMIN SERPL BCG-MCNC: 2 G/DL (ref 3.5–5.2)
ALBUMIN SERPL BCG-MCNC: 2.3 G/DL (ref 3.5–5.2)
ALBUMIN SERPL BCG-MCNC: 3.1 G/DL (ref 3.5–5.2)
ALLEN'S TEST: ABNORMAL
ALP SERPL-CCNC: 52 U/L (ref 40–150)
ALP SERPL-CCNC: 59 U/L (ref 40–150)
ALP SERPL-CCNC: 82 U/L (ref 40–150)
ALT SERPL W P-5'-P-CCNC: 18 U/L (ref 0–70)
ALT SERPL W P-5'-P-CCNC: 33 U/L (ref 0–70)
ALT SERPL W P-5'-P-CCNC: 62 U/L (ref 0–70)
ANION GAP SERPL CALCULATED.3IONS-SCNC: 11 MMOL/L (ref 7–15)
ANION GAP SERPL CALCULATED.3IONS-SCNC: 12 MMOL/L (ref 7–15)
ANION GAP SERPL CALCULATED.3IONS-SCNC: 19 MMOL/L (ref 7–15)
ANION GAP SERPL CALCULATED.3IONS-SCNC: 9 MMOL/L (ref 7–15)
APTT PPP: 182 SECONDS (ref 22–38)
APTT PPP: 39 SECONDS (ref 22–38)
APTT PPP: 47 SECONDS (ref 22–38)
APTT PPP: 71 SECONDS (ref 22–38)
AST SERPL W P-5'-P-CCNC: 112 U/L (ref 0–45)
AST SERPL W P-5'-P-CCNC: 175 U/L (ref 0–45)
AST SERPL W P-5'-P-CCNC: 67 U/L (ref 0–45)
AT III ACT/NOR PPP CHRO: 64 % (ref 85–135)
ATRIAL RATE - MUSE: 63 BPM
BASE EXCESS BLDA CALC-SCNC: -0.4 MMOL/L (ref -3–3)
BASE EXCESS BLDA CALC-SCNC: -1.7 MMOL/L (ref -3–3)
BASE EXCESS BLDA CALC-SCNC: -12.4 MMOL/L (ref -3–3)
BASE EXCESS BLDA CALC-SCNC: -2.2 MMOL/L (ref -3–3)
BASE EXCESS BLDA CALC-SCNC: -2.7 MMOL/L (ref -3–3)
BASE EXCESS BLDA CALC-SCNC: -3.1 MMOL/L (ref -3–3)
BASE EXCESS BLDA CALC-SCNC: -3.3 MMOL/L (ref -3–3)
BASE EXCESS BLDA CALC-SCNC: -4.1 MMOL/L (ref -3–3)
BASE EXCESS BLDA CALC-SCNC: -4.2 MMOL/L (ref -3–3)
BASE EXCESS BLDA CALC-SCNC: -4.3 MMOL/L (ref -3–3)
BASE EXCESS BLDA CALC-SCNC: -5.4 MMOL/L (ref -3–3)
BASE EXCESS BLDA CALC-SCNC: -5.7 MMOL/L (ref -3–3)
BASE EXCESS BLDA CALC-SCNC: -6.3 MMOL/L (ref -3–3)
BASE EXCESS BLDA CALC-SCNC: -6.5 MMOL/L (ref -3–3)
BASE EXCESS BLDA CALC-SCNC: -9.8 MMOL/L (ref -3–3)
BASE EXCESS BLDA CALC-SCNC: 0.2 MMOL/L (ref -3–3)
BASE EXCESS BLDA CALC-SCNC: 0.4 MMOL/L (ref -3–3)
BASE EXCESS BLDA CALC-SCNC: 1 MMOL/L (ref -3–3)
BASE EXCESS BLDA CALC-SCNC: 1.8 MMOL/L (ref -3–3)
BASE EXCESS BLDV CALC-SCNC: -0.4 MMOL/L (ref -3–3)
BASE EXCESS BLDV CALC-SCNC: -1.8 MMOL/L (ref -3–3)
BASE EXCESS BLDV CALC-SCNC: -2.7 MMOL/L (ref -3–3)
BASE EXCESS BLDV CALC-SCNC: -4.4 MMOL/L (ref -3–3)
BASE EXCESS BLDV CALC-SCNC: -5.4 MMOL/L (ref -3–3)
BILIRUB SERPL-MCNC: 0.3 MG/DL
BILIRUB SERPL-MCNC: 0.8 MG/DL
BILIRUB SERPL-MCNC: 0.9 MG/DL
BLD GP AB SCN SERPL QL: NEGATIVE
BLD PROD TYP BPU: NORMAL
BLOOD COMPONENT TYPE: NORMAL
BUN SERPL-MCNC: 11.7 MG/DL (ref 8–23)
BUN SERPL-MCNC: 13.7 MG/DL (ref 8–23)
BUN SERPL-MCNC: 14.2 MG/DL (ref 8–23)
BUN SERPL-MCNC: 8.3 MG/DL (ref 8–23)
CA-I BLD-MCNC: 3.8 MG/DL (ref 4.4–5.2)
CA-I BLD-MCNC: 3.9 MG/DL (ref 4.4–5.2)
CA-I BLD-MCNC: 4 MG/DL (ref 4.4–5.2)
CA-I BLD-MCNC: 4 MG/DL (ref 4.4–5.2)
CA-I BLD-MCNC: 4.2 MG/DL (ref 4.4–5.2)
CA-I BLD-MCNC: 4.3 MG/DL (ref 4.4–5.2)
CA-I BLD-MCNC: 4.4 MG/DL (ref 4.4–5.2)
CA-I BLD-MCNC: 4.6 MG/DL (ref 4.4–5.2)
CA-I BLD-MCNC: 4.6 MG/DL (ref 4.4–5.2)
CA-I BLD-MCNC: 4.7 MG/DL (ref 4.4–5.2)
CA-I BLD-MCNC: 4.7 MG/DL (ref 4.4–5.2)
CA-I BLD-MCNC: 5 MG/DL (ref 4.4–5.2)
CA-I BLD-MCNC: 5.1 MG/DL (ref 4.4–5.2)
CA-I BLD-MCNC: 5.3 MG/DL (ref 4.4–5.2)
CA-I BLD-MCNC: 5.3 MG/DL (ref 4.4–5.2)
CALCIUM SERPL-MCNC: 8.1 MG/DL (ref 8.8–10.4)
CALCIUM SERPL-MCNC: 8.7 MG/DL (ref 8.8–10.4)
CALCIUM SERPL-MCNC: 9 MG/DL (ref 8.8–10.4)
CALCIUM SERPL-MCNC: 9.3 MG/DL (ref 8.8–10.4)
CHLORIDE SERPL-SCNC: 109 MMOL/L (ref 98–107)
CHLORIDE SERPL-SCNC: 110 MMOL/L (ref 98–107)
CHLORIDE SERPL-SCNC: 111 MMOL/L (ref 98–107)
CHLORIDE SERPL-SCNC: 112 MMOL/L (ref 98–107)
CK SERPL-CCNC: 816 U/L (ref 39–308)
CK SERPL-CCNC: 921 U/L (ref 39–308)
CLOT INIT KAOL IND TO POST HEP NEUT TRTO: 1 {RATIO}
CLOT INIT KAOL IND TO POST HEP NEUT TRTO: 1.8 {RATIO}
CLOT INIT KAOL IND TO POST HEP NEUT TRTO: ABNORMAL {RATIO}
CLOT INIT KAOLIN IND BLD US: 182 SEC (ref 113–166)
CLOT INIT KAOLIN IND BLD US: 273 SEC (ref 113–166)
CLOT INIT KAOLIN IND BLD US: ABNORMAL S
CLOT INIT KAOLIN IND P HEP NEUT BLD US: 154 SEC (ref 103–153)
CLOT INIT KAOLIN IND P HEP NEUT BLD US: 170 SEC (ref 103–153)
CLOT INIT KAOLIN IND P HEP NEUT BLD US: 179 SEC (ref 103–153)
CLOT STIFF PLT CONT BLD CALC: 11.6 HPA (ref 11.9–29.8)
CLOT STIFF PLT CONT BLD CALC: 16.5 HPA (ref 11.9–29.8)
CLOT STIFF PLT CONT BLD CALC: 19 HPA (ref 11.9–29.8)
CLOT STIFF TF IND P HEP NEUT BLD US: 13.7 HPA (ref 13–33.2)
CLOT STIFF TF IND P HEP NEUT BLD US: 19.6 HPA (ref 13–33.2)
CLOT STIFF TF IND P HEP NEUT BLD US: 25 HPA (ref 13–33.2)
CLOT STIFF TF IND+IIB-IIIA INH P HEP NEU: 2.1 HPA (ref 1–3.7)
CLOT STIFF TF IND+IIB-IIIA INH P HEP NEU: 3.1 HPA (ref 1–3.7)
CLOT STIFF TF IND+IIB-IIIA INH P HEP NEU: 6 HPA (ref 1–3.7)
CODING SYSTEM: NORMAL
CREAT SERPL-MCNC: 0.68 MG/DL (ref 0.67–1.17)
CREAT SERPL-MCNC: 0.96 MG/DL (ref 0.67–1.17)
CREAT SERPL-MCNC: 1 MG/DL (ref 0.67–1.17)
CREAT SERPL-MCNC: 1.02 MG/DL (ref 0.67–1.17)
CROSSMATCH: NORMAL
CRP SERPL-MCNC: 105 MG/L
D DIMER PPP FEU-MCNC: 0.69 UG/ML FEU (ref 0–0.5)
D DIMER PPP FEU-MCNC: 1.48 UG/ML FEU (ref 0–0.5)
DIASTOLIC BLOOD PRESSURE - MUSE: NORMAL MMHG
EGFRCR SERPLBLD CKD-EPI 2021: 82 ML/MIN/1.73M2
EGFRCR SERPLBLD CKD-EPI 2021: 84 ML/MIN/1.73M2
EGFRCR SERPLBLD CKD-EPI 2021: 88 ML/MIN/1.73M2
EGFRCR SERPLBLD CKD-EPI 2021: >90 ML/MIN/1.73M2
ERYTHROCYTE [DISTWIDTH] IN BLOOD BY AUTOMATED COUNT: 13.3 % (ref 10–15)
ERYTHROCYTE [DISTWIDTH] IN BLOOD BY AUTOMATED COUNT: 15.4 % (ref 10–15)
ERYTHROCYTE [DISTWIDTH] IN BLOOD BY AUTOMATED COUNT: 15.4 % (ref 10–15)
ERYTHROCYTE [DISTWIDTH] IN BLOOD BY AUTOMATED COUNT: 15.5 % (ref 10–15)
ERYTHROCYTE [SEDIMENTATION RATE] IN BLOOD BY WESTERGREN METHOD: 28 MM/HR (ref 0–20)
FIBRINOGEN PPP-MCNC: 229 MG/DL (ref 170–510)
FIBRINOGEN PPP-MCNC: 272 MG/DL (ref 170–510)
FIBRINOGEN PPP-MCNC: 460 MG/DL (ref 170–510)
GLUCOSE BLD-MCNC: 104 MG/DL (ref 70–99)
GLUCOSE BLD-MCNC: 112 MG/DL (ref 70–99)
GLUCOSE BLD-MCNC: 113 MG/DL (ref 70–99)
GLUCOSE BLD-MCNC: 127 MG/DL (ref 70–99)
GLUCOSE BLD-MCNC: 127 MG/DL (ref 70–99)
GLUCOSE BLD-MCNC: 135 MG/DL (ref 70–99)
GLUCOSE BLD-MCNC: 143 MG/DL (ref 70–99)
GLUCOSE BLD-MCNC: 149 MG/DL (ref 70–99)
GLUCOSE BLD-MCNC: 150 MG/DL (ref 70–99)
GLUCOSE BLD-MCNC: 162 MG/DL (ref 70–99)
GLUCOSE BLD-MCNC: 163 MG/DL (ref 70–99)
GLUCOSE BLD-MCNC: 171 MG/DL (ref 70–99)
GLUCOSE BLD-MCNC: 180 MG/DL (ref 70–99)
GLUCOSE BLD-MCNC: 186 MG/DL (ref 70–99)
GLUCOSE BLD-MCNC: 193 MG/DL (ref 70–99)
GLUCOSE BLD-MCNC: 199 MG/DL (ref 70–99)
GLUCOSE BLD-MCNC: 202 MG/DL (ref 70–99)
GLUCOSE BLD-MCNC: 202 MG/DL (ref 70–99)
GLUCOSE BLD-MCNC: 207 MG/DL (ref 70–99)
GLUCOSE BLD-MCNC: 228 MG/DL (ref 70–99)
GLUCOSE BLDC GLUCOMTR-MCNC: 156 MG/DL (ref 70–99)
GLUCOSE BLDC GLUCOMTR-MCNC: 186 MG/DL (ref 70–99)
GLUCOSE BLDC GLUCOMTR-MCNC: 190 MG/DL (ref 70–99)
GLUCOSE SERPL-MCNC: 128 MG/DL (ref 70–99)
GLUCOSE SERPL-MCNC: 164 MG/DL (ref 70–99)
GLUCOSE SERPL-MCNC: 203 MG/DL (ref 70–99)
GLUCOSE SERPL-MCNC: 204 MG/DL (ref 70–99)
HCO3 BLD-SCNC: 22 MMOL/L (ref 21–28)
HCO3 BLD-SCNC: 23 MMOL/L (ref 21–28)
HCO3 BLD-SCNC: 24 MMOL/L (ref 21–28)
HCO3 BLD-SCNC: 24 MMOL/L (ref 21–28)
HCO3 BLD-SCNC: 28 MMOL/L (ref 21–28)
HCO3 BLDA-SCNC: 13 MMOL/L (ref 21–28)
HCO3 BLDA-SCNC: 16 MMOL/L (ref 21–28)
HCO3 BLDA-SCNC: 20 MMOL/L (ref 21–28)
HCO3 BLDA-SCNC: 21 MMOL/L (ref 21–28)
HCO3 BLDA-SCNC: 21 MMOL/L (ref 21–28)
HCO3 BLDA-SCNC: 22 MMOL/L (ref 21–28)
HCO3 BLDA-SCNC: 23 MMOL/L (ref 21–28)
HCO3 BLDA-SCNC: 23 MMOL/L (ref 21–28)
HCO3 BLDA-SCNC: 24 MMOL/L (ref 21–28)
HCO3 BLDA-SCNC: 24 MMOL/L (ref 21–28)
HCO3 BLDV-SCNC: 23 MMOL/L (ref 21–28)
HCO3 BLDV-SCNC: 24 MMOL/L (ref 21–28)
HCO3 BLDV-SCNC: 26 MMOL/L (ref 21–28)
HCO3 SERPL-SCNC: 20 MMOL/L (ref 22–29)
HCO3 SERPL-SCNC: 22 MMOL/L (ref 22–29)
HCO3 SERPL-SCNC: 24 MMOL/L (ref 22–29)
HCO3 SERPL-SCNC: 25 MMOL/L (ref 22–29)
HCT VFR BLD AUTO: 27.1 % (ref 40–53)
HCT VFR BLD AUTO: 29.2 % (ref 40–53)
HCT VFR BLD AUTO: 30.1 % (ref 40–53)
HCT VFR BLD AUTO: 30.9 % (ref 40–53)
HGB BLD-MCNC: 10 G/DL (ref 13.3–17.7)
HGB BLD-MCNC: 10 G/DL (ref 13.3–17.7)
HGB BLD-MCNC: 10.3 G/DL (ref 13.3–17.7)
HGB BLD-MCNC: 10.9 G/DL (ref 13.3–17.7)
HGB BLD-MCNC: 6.1 G/DL (ref 13.3–17.7)
HGB BLD-MCNC: 6.5 G/DL (ref 13.3–17.7)
HGB BLD-MCNC: 7.2 G/DL (ref 13.3–17.7)
HGB BLD-MCNC: 7.6 G/DL (ref 13.3–17.7)
HGB BLD-MCNC: 7.6 G/DL (ref 13.3–17.7)
HGB BLD-MCNC: 7.8 G/DL (ref 13.3–17.7)
HGB BLD-MCNC: 7.8 G/DL (ref 13.3–17.7)
HGB BLD-MCNC: 7.9 G/DL (ref 13.3–17.7)
HGB BLD-MCNC: 8 G/DL (ref 13.3–17.7)
HGB BLD-MCNC: 8.4 G/DL (ref 13.3–17.7)
HGB BLD-MCNC: 8.4 G/DL (ref 13.3–17.7)
HGB BLD-MCNC: 8.5 G/DL (ref 13.3–17.7)
HGB BLD-MCNC: 9.4 G/DL (ref 13.3–17.7)
HGB BLD-MCNC: 9.8 G/DL (ref 13.3–17.7)
HGB BLD-MCNC: 9.8 G/DL (ref 13.3–17.7)
HGB FREE PLAS-MCNC: 40 MG/DL
INR PPP: 1.18 (ref 0.85–1.15)
INR PPP: 1.42 (ref 0.85–1.15)
INR PPP: 1.43 (ref 0.85–1.15)
INR PPP: 1.59 (ref 0.85–1.15)
INR PPP: 2.19 (ref 0.85–1.15)
INTERPRETATION ECG - MUSE: NORMAL
INTERPRETATION TEGPIA: NORMAL
ISSUE DATE AND TIME: NORMAL
LACTATE BLD-SCNC: 0.5 MMOL/L (ref 0.7–2)
LACTATE BLD-SCNC: 0.8 MMOL/L (ref 0.7–2)
LACTATE BLD-SCNC: 10.1 MMOL/L (ref 0.7–2)
LACTATE BLD-SCNC: 11.4 MMOL/L (ref 0.7–2)
LACTATE BLD-SCNC: 12 MMOL/L (ref 0.7–2)
LACTATE BLD-SCNC: 12.1 MMOL/L (ref 0.7–2)
LACTATE BLD-SCNC: 12.4 MMOL/L (ref 0.7–2)
LACTATE BLD-SCNC: 2.8 MMOL/L (ref 0.7–2)
LACTATE BLD-SCNC: 2.9 MMOL/L (ref 0.7–2)
LACTATE BLD-SCNC: 3.8 MMOL/L (ref 0.7–2)
LACTATE BLD-SCNC: 4.6 MMOL/L (ref 0.7–2)
LACTATE BLD-SCNC: 5.9 MMOL/L (ref 0.7–2)
LACTATE BLD-SCNC: 7.6 MMOL/L (ref 0.7–2)
LACTATE BLD-SCNC: 8.9 MMOL/L (ref 0.7–2)
LACTATE BLD-SCNC: 9.8 MMOL/L (ref 0.7–2)
LACTATE SERPL-SCNC: 0.6 MMOL/L (ref 0.7–2)
LACTATE SERPL-SCNC: 3.5 MMOL/L (ref 0.7–2)
LACTATE SERPL-SCNC: 4.8 MMOL/L (ref 0.7–2)
LACTATE SERPL-SCNC: 6 MMOL/L (ref 0.7–2)
LACTATE SERPL-SCNC: 8.8 MMOL/L (ref 0.7–2)
LDH SERPL L TO P-CCNC: 482 U/L (ref 0–250)
LOCATION OF TASK: NORMAL
MAGNESIUM SERPL-MCNC: 2.3 MG/DL (ref 1.7–2.3)
MAGNESIUM SERPL-MCNC: 2.4 MG/DL (ref 1.7–2.3)
MAGNESIUM SERPL-MCNC: 2.8 MG/DL (ref 1.7–2.3)
MCH RBC QN AUTO: 29.3 PG (ref 26.5–33)
MCH RBC QN AUTO: 29.3 PG (ref 26.5–33)
MCH RBC QN AUTO: 29.9 PG (ref 26.5–33)
MCH RBC QN AUTO: 30.4 PG (ref 26.5–33)
MCHC RBC AUTO-ENTMCNC: 33.6 G/DL (ref 31.5–36.5)
MCHC RBC AUTO-ENTMCNC: 34.2 G/DL (ref 31.5–36.5)
MCHC RBC AUTO-ENTMCNC: 34.7 G/DL (ref 31.5–36.5)
MCHC RBC AUTO-ENTMCNC: 35.3 G/DL (ref 31.5–36.5)
MCV RBC AUTO: 85 FL (ref 78–100)
MCV RBC AUTO: 86 FL (ref 78–100)
MCV RBC AUTO: 87 FL (ref 78–100)
MCV RBC AUTO: 88 FL (ref 78–100)
O2/TOTAL GAS SETTING VFR VENT: 100 %
O2/TOTAL GAS SETTING VFR VENT: 40 %
O2/TOTAL GAS SETTING VFR VENT: 50 %
O2/TOTAL GAS SETTING VFR VENT: 50 %
O2/TOTAL GAS SETTING VFR VENT: 60 %
O2/TOTAL GAS SETTING VFR VENT: 80 %
OXYHGB MFR BLDA: 92 % (ref 92–100)
OXYHGB MFR BLDA: 94 % (ref 92–100)
OXYHGB MFR BLDA: 95 % (ref 92–100)
OXYHGB MFR BLDA: 96 % (ref 92–100)
OXYHGB MFR BLDA: 97 % (ref 75–100)
OXYHGB MFR BLDA: 97 % (ref 92–100)
OXYHGB MFR BLDA: 98 % (ref 92–100)
OXYHGB MFR BLDA: 99 % (ref 75–100)
OXYHGB MFR BLDV: 62 % (ref 70–75)
OXYHGB MFR BLDV: 73 % (ref 70–75)
OXYHGB MFR BLDV: 81 %
OXYHGB MFR BLDV: 81 % (ref 70–75)
OXYHGB MFR BLDV: 91 %
P AXIS - MUSE: -13 DEGREES
PA AA BLD-ACNC: 100 %
PA ADP BLD-ACNC: 23 %
PCO2 BLD: 28 MM HG (ref 35–45)
PCO2 BLD: 37 MM HG (ref 35–45)
PCO2 BLD: 38 MM HG (ref 35–45)
PCO2 BLD: 39 MM HG (ref 35–45)
PCO2 BLD: 40 MM HG (ref 35–45)
PCO2 BLD: 44 MM HG (ref 35–45)
PCO2 BLD: 53 MM HG (ref 35–45)
PCO2 BLDA: 29 MM HG (ref 35–45)
PCO2 BLDA: 32 MM HG (ref 35–45)
PCO2 BLDA: 35 MM HG (ref 35–45)
PCO2 BLDA: 39 MM HG (ref 35–45)
PCO2 BLDA: 40 MM HG (ref 35–45)
PCO2 BLDA: 41 MM HG (ref 35–45)
PCO2 BLDA: 42 MM HG (ref 35–45)
PCO2 BLDA: 42 MM HG (ref 35–45)
PCO2 BLDA: 44 MM HG (ref 35–45)
PCO2 BLDA: 44 MM HG (ref 35–45)
PCO2 BLDA: 46 MM HG (ref 35–45)
PCO2 BLDA: 48 MM HG (ref 35–45)
PCO2 BLDA: 49 MM HG (ref 35–45)
PCO2 BLDA: 57 MM HG (ref 35–45)
PCO2 BLDV: 47 MM HG (ref 40–50)
PCO2 BLDV: 51 MM HG (ref 40–50)
PCO2 BLDV: 52 MM HG (ref 40–50)
PCO2 BLDV: 61 MM HG (ref 40–50)
PCO2 BLDV: 61 MM HG (ref 40–50)
PEEP: 8 CM H2O
PH BLD: 7.31 [PH] (ref 7.35–7.45)
PH BLD: 7.34 [PH] (ref 7.35–7.45)
PH BLD: 7.37 [PH] (ref 7.35–7.45)
PH BLD: 7.38 [PH] (ref 7.35–7.45)
PH BLD: 7.41 [PH] (ref 7.35–7.45)
PH BLD: 7.43 [PH] (ref 7.35–7.45)
PH BLD: 7.53 [PH] (ref 7.35–7.45)
PH BLDA: 7.21 [PH] (ref 7.35–7.45)
PH BLDA: 7.24 [PH] (ref 7.35–7.45)
PH BLDA: 7.26 [PH] (ref 7.35–7.45)
PH BLDA: 7.28 [PH] (ref 7.35–7.45)
PH BLDA: 7.28 [PH] (ref 7.35–7.45)
PH BLDA: 7.29 [PH] (ref 7.35–7.45)
PH BLDA: 7.31 [PH] (ref 7.35–7.45)
PH BLDA: 7.32 [PH] (ref 7.35–7.45)
PH BLDA: 7.32 [PH] (ref 7.35–7.45)
PH BLDA: 7.33 [PH] (ref 7.35–7.45)
PH BLDA: 7.35 [PH] (ref 7.35–7.45)
PH BLDA: 7.37 [PH] (ref 7.35–7.45)
PH BLDA: 7.37 [PH] (ref 7.35–7.45)
PH BLDA: 7.48 [PH] (ref 7.35–7.45)
PH BLDV: 7.19 [PH] (ref 7.32–7.43)
PH BLDV: 7.2 [PH] (ref 7.32–7.43)
PH BLDV: 7.27 [PH] (ref 7.32–7.43)
PH BLDV: 7.32 [PH] (ref 7.32–7.43)
PH BLDV: 7.32 [PH] (ref 7.32–7.43)
PHOSPHATE SERPL-MCNC: 2.4 MG/DL (ref 2.5–4.5)
PHOSPHATE SERPL-MCNC: 2.6 MG/DL (ref 2.5–4.5)
PLAT MORPH BLD: ABNORMAL
PLAT MORPH BLD: NORMAL
PLAT MORPH BLD: NORMAL
PLATELET # BLD AUTO: 110 10E3/UL (ref 150–450)
PLATELET # BLD AUTO: 122 10E3/UL (ref 150–450)
PLATELET # BLD AUTO: 84 10E3/UL (ref 150–450)
PLATELET # BLD AUTO: 90 10E3/UL (ref 150–450)
PLATELET # BLD AUTO: 90 10E3/UL (ref 150–450)
PO2 BLD: 103 MM HG (ref 80–105)
PO2 BLD: 108 MM HG (ref 80–105)
PO2 BLD: 134 MM HG (ref 80–105)
PO2 BLD: 142 MM HG (ref 80–105)
PO2 BLD: 146 MM HG (ref 80–105)
PO2 BLD: 82 MM HG (ref 80–105)
PO2 BLD: 93 MM HG (ref 80–105)
PO2 BLDA: 229 MM HG (ref 80–105)
PO2 BLDA: 244 MM HG (ref 80–105)
PO2 BLDA: 244 MM HG (ref 80–105)
PO2 BLDA: 246 MM HG (ref 80–105)
PO2 BLDA: 340 MM HG (ref 80–105)
PO2 BLDA: 356 MM HG (ref 80–105)
PO2 BLDA: 368 MM HG (ref 80–105)
PO2 BLDA: 388 MM HG (ref 80–105)
PO2 BLDA: 408 MM HG (ref 80–105)
PO2 BLDA: 413 MM HG (ref 80–105)
PO2 BLDA: 452 MM HG (ref 80–105)
PO2 BLDA: 81 MM HG (ref 80–105)
PO2 BLDA: 84 MM HG (ref 80–105)
PO2 BLDA: 98 MM HG (ref 80–105)
PO2 BLDV: 40 MM HG (ref 25–47)
PO2 BLDV: 41 MM HG (ref 25–47)
PO2 BLDV: 52 MM HG (ref 25–47)
PO2 BLDV: 55 MM HG (ref 25–47)
PO2 BLDV: 66 MM HG (ref 25–47)
POLYCHROMASIA BLD QL SMEAR: SLIGHT
POTASSIUM BLD-SCNC: 2.7 MMOL/L (ref 3.4–5.3)
POTASSIUM BLD-SCNC: 3.3 MMOL/L (ref 3.4–5.3)
POTASSIUM BLD-SCNC: 3.4 MMOL/L (ref 3.4–5.3)
POTASSIUM BLD-SCNC: 3.6 MMOL/L (ref 3.4–5.3)
POTASSIUM BLD-SCNC: 3.7 MMOL/L (ref 3.4–5.3)
POTASSIUM BLD-SCNC: 3.8 MMOL/L (ref 3.4–5.3)
POTASSIUM BLD-SCNC: 3.9 MMOL/L (ref 3.4–5.3)
POTASSIUM BLD-SCNC: 4 MMOL/L (ref 3.4–5.3)
POTASSIUM BLD-SCNC: 4.1 MMOL/L (ref 3.4–5.3)
POTASSIUM BLD-SCNC: 4.2 MMOL/L (ref 3.4–5.3)
POTASSIUM SERPL-SCNC: 4.1 MMOL/L (ref 3.4–5.3)
POTASSIUM SERPL-SCNC: 4.1 MMOL/L (ref 3.4–5.3)
POTASSIUM SERPL-SCNC: 4.3 MMOL/L (ref 3.4–5.3)
POTASSIUM SERPL-SCNC: 4.6 MMOL/L (ref 3.4–5.3)
PR INTERVAL - MUSE: 150 MS
PROT SERPL-MCNC: 3.2 G/DL (ref 6.4–8.3)
PROT SERPL-MCNC: 3.9 G/DL (ref 6.4–8.3)
PROT SERPL-MCNC: 5.3 G/DL (ref 6.4–8.3)
QRS DURATION - MUSE: 86 MS
QT - MUSE: 560 MS
QTC - MUSE: 573 MS
R AXIS - MUSE: 36 DEGREES
RBC # BLD AUTO: 3.09 10E6/UL (ref 4.4–5.9)
RBC # BLD AUTO: 3.34 10E6/UL (ref 4.4–5.9)
RBC # BLD AUTO: 3.51 10E6/UL (ref 4.4–5.9)
RBC # BLD AUTO: 3.64 10E6/UL (ref 4.4–5.9)
RBC MORPH BLD: ABNORMAL
RBC MORPH BLD: NORMAL
RBC MORPH BLD: NORMAL
SAO2 % BLDA: 100 % (ref 96–97)
SAO2 % BLDA: 95 % (ref 96–97)
SAO2 % BLDA: 96 % (ref 96–97)
SAO2 % BLDA: 97.9 % (ref 96–97)
SAO2 % BLDA: 98 % (ref 96–97)
SAO2 % BLDA: 98.8 % (ref 96–97)
SAO2 % BLDA: 99.2 % (ref 96–97)
SAO2 % BLDA: 99.2 % (ref 96–97)
SAO2 % BLDA: 99.7 % (ref 96–97)
SAO2 % BLDA: 99.7 % (ref 96–97)
SAO2 % BLDA: >100 % (ref 96–97)
SAO2 % BLDA: >100 % (ref 96–97)
SAO2 % BLDV: 64 % (ref 70–75)
SAO2 % BLDV: 75 % (ref 70–75)
SAO2 % BLDV: 83.3 % (ref 70–75)
SAO2 % BLDV: 84 % (ref 70–75)
SAO2 % BLDV: 92.9 % (ref 70–75)
SODIUM BLD-SCNC: 141 MMOL/L (ref 135–145)
SODIUM BLD-SCNC: 142 MMOL/L (ref 135–145)
SODIUM BLD-SCNC: 144 MMOL/L (ref 135–145)
SODIUM BLD-SCNC: 144 MMOL/L (ref 135–145)
SODIUM BLD-SCNC: 146 MMOL/L (ref 135–145)
SODIUM BLD-SCNC: 146 MMOL/L (ref 135–145)
SODIUM BLD-SCNC: 148 MMOL/L (ref 135–145)
SODIUM BLD-SCNC: 149 MMOL/L (ref 135–145)
SODIUM BLD-SCNC: 149 MMOL/L (ref 135–145)
SODIUM BLD-SCNC: 150 MMOL/L (ref 135–145)
SODIUM SERPL-SCNC: 140 MMOL/L (ref 135–145)
SODIUM SERPL-SCNC: 147 MMOL/L (ref 135–145)
SODIUM SERPL-SCNC: 148 MMOL/L (ref 135–145)
SODIUM SERPL-SCNC: 149 MMOL/L (ref 135–145)
SPECIMEN EXP DATE BLD: NORMAL
SYSTOLIC BLOOD PRESSURE - MUSE: NORMAL MMHG
T AXIS - MUSE: 269 DEGREES
T3 SERPL-MCNC: 45 NG/DL (ref 85–202)
T3FREE SERPL-MCNC: 1.5 PG/ML (ref 2–4.4)
T4 FREE SERPL-MCNC: 0.99 NG/DL (ref 0.9–1.7)
TROPONIN T SERPL HS-MCNC: 1922 NG/L
TROPONIN T SERPL HS-MCNC: 4342 NG/L
TSH SERPL DL<=0.005 MIU/L-ACNC: 0.48 UIU/ML (ref 0.3–4.2)
UFH PPP CHRO-ACNC: 0.13 IU/ML
UFH PPP CHRO-ACNC: 0.71 IU/ML
UFH PPP CHRO-ACNC: <0.1 IU/ML
UNIT ABO/RH: NORMAL
UNIT NUMBER: NORMAL
UNIT STATUS: NORMAL
UNIT TYPE ISBT: 5100
UNIT TYPE ISBT: 5100
UNIT TYPE ISBT: 600
UNIT TYPE ISBT: 6200
VENTRICULAR RATE- MUSE: 63 BPM
WBC # BLD AUTO: 20.2 10E3/UL (ref 4–11)
WBC # BLD AUTO: 21.4 10E3/UL (ref 4–11)
WBC # BLD AUTO: 23 10E3/UL (ref 4–11)
WBC # BLD AUTO: 6.6 10E3/UL (ref 4–11)

## 2025-01-02 PROCEDURE — 83605 ASSAY OF LACTIC ACID: CPT | Performed by: NURSE PRACTITIONER

## 2025-01-02 PROCEDURE — 5A1221Z PERFORMANCE OF CARDIAC OUTPUT, CONTINUOUS: ICD-10-PCS | Performed by: STUDENT IN AN ORGANIZED HEALTH CARE EDUCATION/TRAINING PROGRAM

## 2025-01-02 PROCEDURE — 82805 BLOOD GASES W/O2 SATURATION: CPT | Performed by: STUDENT IN AN ORGANIZED HEALTH CARE EDUCATION/TRAINING PROGRAM

## 2025-01-02 PROCEDURE — 87070 CULTURE OTHR SPECIMN AEROBIC: CPT | Performed by: NURSE PRACTITIONER

## 2025-01-02 PROCEDURE — 84295 ASSAY OF SERUM SODIUM: CPT

## 2025-01-02 PROCEDURE — 82947 ASSAY GLUCOSE BLOOD QUANT: CPT | Performed by: STUDENT IN AN ORGANIZED HEALTH CARE EDUCATION/TRAINING PROGRAM

## 2025-01-02 PROCEDURE — 85347 COAGULATION TIME ACTIVATED: CPT

## 2025-01-02 PROCEDURE — 93005 ELECTROCARDIOGRAM TRACING: CPT

## 2025-01-02 PROCEDURE — 82330 ASSAY OF CALCIUM: CPT

## 2025-01-02 PROCEDURE — 71045 X-RAY EXAM CHEST 1 VIEW: CPT

## 2025-01-02 PROCEDURE — 86923 COMPATIBILITY TEST ELECTRIC: CPT

## 2025-01-02 PROCEDURE — 82040 ASSAY OF SERUM ALBUMIN: CPT | Performed by: PHYSICIAN ASSISTANT

## 2025-01-02 PROCEDURE — 250N000011 HC RX IP 250 OP 636: Performed by: STUDENT IN AN ORGANIZED HEALTH CARE EDUCATION/TRAINING PROGRAM

## 2025-01-02 PROCEDURE — 021209W BYPASS CORONARY ARTERY, THREE ARTERIES FROM AORTA WITH AUTOLOGOUS VENOUS TISSUE, OPEN APPROACH: ICD-10-PCS | Performed by: STUDENT IN AN ORGANIZED HEALTH CARE EDUCATION/TRAINING PROGRAM

## 2025-01-02 PROCEDURE — 250N000013 HC RX MED GY IP 250 OP 250 PS 637: Performed by: STUDENT IN AN ORGANIZED HEALTH CARE EDUCATION/TRAINING PROGRAM

## 2025-01-02 PROCEDURE — 86900 BLOOD TYPING SEROLOGIC ABO: CPT | Performed by: STUDENT IN AN ORGANIZED HEALTH CARE EDUCATION/TRAINING PROGRAM

## 2025-01-02 PROCEDURE — 85730 THROMBOPLASTIN TIME PARTIAL: CPT | Performed by: NURSE PRACTITIONER

## 2025-01-02 PROCEDURE — 250N000009 HC RX 250: Performed by: NURSE PRACTITIONER

## 2025-01-02 PROCEDURE — 84481 FREE ASSAY (FT-3): CPT | Performed by: NURSE PRACTITIONER

## 2025-01-02 PROCEDURE — 85610 PROTHROMBIN TIME: CPT | Performed by: NURSE PRACTITIONER

## 2025-01-02 PROCEDURE — 258N000003 HC RX IP 258 OP 636

## 2025-01-02 PROCEDURE — 250N000009 HC RX 250: Performed by: STUDENT IN AN ORGANIZED HEALTH CARE EDUCATION/TRAINING PROGRAM

## 2025-01-02 PROCEDURE — 84155 ASSAY OF PROTEIN SERUM: CPT | Performed by: NURSE PRACTITIONER

## 2025-01-02 PROCEDURE — 360N000079 HC SURGERY LEVEL 6, PER MIN: Performed by: STUDENT IN AN ORGANIZED HEALTH CARE EDUCATION/TRAINING PROGRAM

## 2025-01-02 PROCEDURE — P9016 RBC LEUKOCYTES REDUCED: HCPCS

## 2025-01-02 PROCEDURE — 85384 FIBRINOGEN ACTIVITY: CPT | Performed by: STUDENT IN AN ORGANIZED HEALTH CARE EDUCATION/TRAINING PROGRAM

## 2025-01-02 PROCEDURE — 85027 COMPLETE CBC AUTOMATED: CPT | Performed by: STUDENT IN AN ORGANIZED HEALTH CARE EDUCATION/TRAINING PROGRAM

## 2025-01-02 PROCEDURE — 33519 CABG ARTERY-VEIN THREE: CPT | Mod: GC | Performed by: STUDENT IN AN ORGANIZED HEALTH CARE EDUCATION/TRAINING PROGRAM

## 2025-01-02 PROCEDURE — 85014 HEMATOCRIT: CPT | Performed by: PHYSICIAN ASSISTANT

## 2025-01-02 PROCEDURE — 250N000012 HC RX MED GY IP 250 OP 636 PS 637: Performed by: STUDENT IN AN ORGANIZED HEALTH CARE EDUCATION/TRAINING PROGRAM

## 2025-01-02 PROCEDURE — 94003 VENT MGMT INPAT SUBQ DAY: CPT

## 2025-01-02 PROCEDURE — 84100 ASSAY OF PHOSPHORUS: CPT | Performed by: PHYSICIAN ASSISTANT

## 2025-01-02 PROCEDURE — 85520 HEPARIN ASSAY: CPT | Performed by: NURSE PRACTITIONER

## 2025-01-02 PROCEDURE — 999N000157 HC STATISTIC RCP TIME EA 10 MIN

## 2025-01-02 PROCEDURE — 258N000003 HC RX IP 258 OP 636: Performed by: STUDENT IN AN ORGANIZED HEALTH CARE EDUCATION/TRAINING PROGRAM

## 2025-01-02 PROCEDURE — 71045 X-RAY EXAM CHEST 1 VIEW: CPT | Mod: 26 | Performed by: RADIOLOGY

## 2025-01-02 PROCEDURE — 99291 CRITICAL CARE FIRST HOUR: CPT | Mod: FS | Performed by: PHYSICIAN ASSISTANT

## 2025-01-02 PROCEDURE — 84443 ASSAY THYROID STIM HORMONE: CPT | Performed by: NURSE PRACTITIONER

## 2025-01-02 PROCEDURE — 86923 COMPATIBILITY TEST ELECTRIC: CPT | Performed by: PHYSICIAN ASSISTANT

## 2025-01-02 PROCEDURE — 82550 ASSAY OF CK (CPK): CPT | Performed by: NURSE PRACTITIONER

## 2025-01-02 PROCEDURE — 250N000011 HC RX IP 250 OP 636: Performed by: PHYSICIAN ASSISTANT

## 2025-01-02 PROCEDURE — 999N000185 HC STATISTIC TRANSPORT TIME EA 15 MIN

## 2025-01-02 PROCEDURE — 02580ZZ DESTRUCTION OF CONDUCTION MECHANISM, OPEN APPROACH: ICD-10-PCS | Performed by: STUDENT IN AN ORGANIZED HEALTH CARE EDUCATION/TRAINING PROGRAM

## 2025-01-02 PROCEDURE — 84155 ASSAY OF PROTEIN SERUM: CPT | Performed by: PHYSICIAN ASSISTANT

## 2025-01-02 PROCEDURE — 999N000065 XR CHEST PORT 1 VIEW

## 2025-01-02 PROCEDURE — 85610 PROTHROMBIN TIME: CPT | Performed by: PHYSICIAN ASSISTANT

## 2025-01-02 PROCEDURE — 84480 ASSAY TRIIODOTHYRONINE (T3): CPT | Performed by: NURSE PRACTITIONER

## 2025-01-02 PROCEDURE — 250N000011 HC RX IP 250 OP 636

## 2025-01-02 PROCEDURE — 82310 ASSAY OF CALCIUM: CPT | Performed by: PHYSICIAN ASSISTANT

## 2025-01-02 PROCEDURE — 85652 RBC SED RATE AUTOMATED: CPT | Performed by: NURSE PRACTITIONER

## 2025-01-02 PROCEDURE — 83615 LACTATE (LD) (LDH) ENZYME: CPT | Performed by: NURSE PRACTITIONER

## 2025-01-02 PROCEDURE — 250N000009 HC RX 250

## 2025-01-02 PROCEDURE — 85300 ANTITHROMBIN III ACTIVITY: CPT | Performed by: NURSE PRACTITIONER

## 2025-01-02 PROCEDURE — 84439 ASSAY OF FREE THYROXINE: CPT | Performed by: NURSE PRACTITIONER

## 2025-01-02 PROCEDURE — 82550 ASSAY OF CK (CPK): CPT | Performed by: NURSE ANESTHETIST, CERTIFIED REGISTERED

## 2025-01-02 PROCEDURE — 83051 HEMOGLOBIN PLASMA: CPT | Performed by: NURSE PRACTITIONER

## 2025-01-02 PROCEDURE — 86140 C-REACTIVE PROTEIN: CPT | Performed by: NURSE PRACTITIONER

## 2025-01-02 PROCEDURE — 84100 ASSAY OF PHOSPHORUS: CPT | Performed by: NURSE PRACTITIONER

## 2025-01-02 PROCEDURE — 83605 ASSAY OF LACTIC ACID: CPT | Performed by: PHYSICIAN ASSISTANT

## 2025-01-02 PROCEDURE — 250N000013 HC RX MED GY IP 250 OP 250 PS 637: Performed by: NURSE PRACTITIONER

## 2025-01-02 PROCEDURE — 83735 ASSAY OF MAGNESIUM: CPT | Performed by: PHYSICIAN ASSISTANT

## 2025-01-02 PROCEDURE — 250N000011 HC RX IP 250 OP 636: Mod: JZ

## 2025-01-02 PROCEDURE — 85610 PROTHROMBIN TIME: CPT | Performed by: STUDENT IN AN ORGANIZED HEALTH CARE EDUCATION/TRAINING PROGRAM

## 2025-01-02 PROCEDURE — C1898 LEAD, PMKR, OTHER THAN TRANS: HCPCS | Performed by: STUDENT IN AN ORGANIZED HEALTH CARE EDUCATION/TRAINING PROGRAM

## 2025-01-02 PROCEDURE — 33949 ECMO/ECLS DAILY MGMT ARTERY: CPT

## 2025-01-02 PROCEDURE — 02L70CK OCCLUSION OF LEFT ATRIAL APPENDAGE WITH EXTRALUMINAL DEVICE, OPEN APPROACH: ICD-10-PCS | Performed by: STUDENT IN AN ORGANIZED HEALTH CARE EDUCATION/TRAINING PROGRAM

## 2025-01-02 PROCEDURE — 85396 CLOTTING ASSAY WHOLE BLOOD: CPT

## 2025-01-02 PROCEDURE — 82330 ASSAY OF CALCIUM: CPT | Performed by: NURSE PRACTITIONER

## 2025-01-02 PROCEDURE — 82330 ASSAY OF CALCIUM: CPT | Performed by: PHYSICIAN ASSISTANT

## 2025-01-02 PROCEDURE — 84484 ASSAY OF TROPONIN QUANT: CPT | Performed by: NURSE PRACTITIONER

## 2025-01-02 PROCEDURE — 85049 AUTOMATED PLATELET COUNT: CPT | Performed by: NURSE PRACTITIONER

## 2025-01-02 PROCEDURE — 85049 AUTOMATED PLATELET COUNT: CPT | Performed by: STUDENT IN AN ORGANIZED HEALTH CARE EDUCATION/TRAINING PROGRAM

## 2025-01-02 PROCEDURE — 33533 CABG ARTERIAL SINGLE: CPT | Mod: GC | Performed by: STUDENT IN AN ORGANIZED HEALTH CARE EDUCATION/TRAINING PROGRAM

## 2025-01-02 PROCEDURE — P9037 PLATE PHERES LEUKOREDU IRRAD: HCPCS | Performed by: PHYSICIAN ASSISTANT

## 2025-01-02 PROCEDURE — 250N000024 HC ISOFLURANE, PER MIN: Performed by: STUDENT IN AN ORGANIZED HEALTH CARE EDUCATION/TRAINING PROGRAM

## 2025-01-02 PROCEDURE — 85027 COMPLETE CBC AUTOMATED: CPT | Performed by: NURSE PRACTITIONER

## 2025-01-02 PROCEDURE — 86850 RBC ANTIBODY SCREEN: CPT | Performed by: STUDENT IN AN ORGANIZED HEALTH CARE EDUCATION/TRAINING PROGRAM

## 2025-01-02 PROCEDURE — 85379 FIBRIN DEGRADATION QUANT: CPT | Performed by: NURSE PRACTITIONER

## 2025-01-02 PROCEDURE — 85520 HEPARIN ASSAY: CPT | Performed by: PHYSICIAN ASSISTANT

## 2025-01-02 PROCEDURE — 33257 ABLATE ATRIA LMTD ADD-ON: CPT | Mod: GC | Performed by: STUDENT IN AN ORGANIZED HEALTH CARE EDUCATION/TRAINING PROGRAM

## 2025-01-02 PROCEDURE — 82040 ASSAY OF SERUM ALBUMIN: CPT | Performed by: NURSE PRACTITIONER

## 2025-01-02 PROCEDURE — 272N000001 HC OR GENERAL SUPPLY STERILE: Performed by: STUDENT IN AN ORGANIZED HEALTH CARE EDUCATION/TRAINING PROGRAM

## 2025-01-02 PROCEDURE — 85049 AUTOMATED PLATELET COUNT: CPT | Performed by: PHYSICIAN ASSISTANT

## 2025-01-02 PROCEDURE — 74018 RADEX ABDOMEN 1 VIEW: CPT

## 2025-01-02 PROCEDURE — 82947 ASSAY GLUCOSE BLOOD QUANT: CPT | Performed by: NURSE PRACTITIONER

## 2025-01-02 PROCEDURE — 370N000017 HC ANESTHESIA TECHNICAL FEE, PER MIN: Performed by: STUDENT IN AN ORGANIZED HEALTH CARE EDUCATION/TRAINING PROGRAM

## 2025-01-02 PROCEDURE — 85730 THROMBOPLASTIN TIME PARTIAL: CPT | Performed by: PHYSICIAN ASSISTANT

## 2025-01-02 PROCEDURE — 74018 RADEX ABDOMEN 1 VIEW: CPT | Mod: 26 | Performed by: RADIOLOGY

## 2025-01-02 PROCEDURE — 250N000011 HC RX IP 250 OP 636: Mod: JZ | Performed by: NURSE PRACTITIONER

## 2025-01-02 PROCEDURE — 02100Z9 BYPASS CORONARY ARTERY, ONE ARTERY FROM LEFT INTERNAL MAMMARY, OPEN APPROACH: ICD-10-PCS | Performed by: STUDENT IN AN ORGANIZED HEALTH CARE EDUCATION/TRAINING PROGRAM

## 2025-01-02 PROCEDURE — 410N000004: Performed by: STUDENT IN AN ORGANIZED HEALTH CARE EDUCATION/TRAINING PROGRAM

## 2025-01-02 PROCEDURE — 82805 BLOOD GASES W/O2 SATURATION: CPT

## 2025-01-02 PROCEDURE — 200N000002 HC R&B ICU UMMC

## 2025-01-02 PROCEDURE — 82805 BLOOD GASES W/O2 SATURATION: CPT | Performed by: PHYSICIAN ASSISTANT

## 2025-01-02 PROCEDURE — 06BQ4ZZ EXCISION OF LEFT SAPHENOUS VEIN, PERCUTANEOUS ENDOSCOPIC APPROACH: ICD-10-PCS | Performed by: STUDENT IN AN ORGANIZED HEALTH CARE EDUCATION/TRAINING PROGRAM

## 2025-01-02 PROCEDURE — 83735 ASSAY OF MAGNESIUM: CPT | Performed by: NURSE PRACTITIONER

## 2025-01-02 PROCEDURE — 278N000051 HC OR IMPLANT GENERAL: Performed by: STUDENT IN AN ORGANIZED HEALTH CARE EDUCATION/TRAINING PROGRAM

## 2025-01-02 PROCEDURE — P9047 ALBUMIN (HUMAN), 25%, 50ML: HCPCS

## 2025-01-02 PROCEDURE — 83605 ASSAY OF LACTIC ACID: CPT

## 2025-01-02 PROCEDURE — 258N000003 HC RX IP 258 OP 636: Performed by: NURSE PRACTITIONER

## 2025-01-02 PROCEDURE — 410N000003 HC PER-PERFUSION 1ST 30 MIN: Performed by: STUDENT IN AN ORGANIZED HEALTH CARE EDUCATION/TRAINING PROGRAM

## 2025-01-02 DEVICE — IMP ATRICLIP FLEX V DEVICE LAA EXLUSION 50MM ACHV50: Type: IMPLANTABLE DEVICE | Site: HEART | Status: FUNCTIONAL

## 2025-01-02 RX ORDER — EPINEPHRINE IN 0.9 % SOD CHLOR 5 MG/250ML
.01-.1 PLASTIC BAG, INJECTION (ML) INTRAVENOUS CONTINUOUS
Status: DISCONTINUED | OUTPATIENT
Start: 2025-01-02 | End: 2025-01-03

## 2025-01-02 RX ORDER — CEFTRIAXONE 2 G/1
2 INJECTION, POWDER, FOR SOLUTION INTRAMUSCULAR; INTRAVENOUS EVERY 24 HOURS
Status: DISCONTINUED | OUTPATIENT
Start: 2025-01-03 | End: 2025-01-03

## 2025-01-02 RX ORDER — NALOXONE HYDROCHLORIDE 0.4 MG/ML
0.4 INJECTION, SOLUTION INTRAMUSCULAR; INTRAVENOUS; SUBCUTANEOUS
Status: DISCONTINUED | OUTPATIENT
Start: 2025-01-02 | End: 2025-01-25 | Stop reason: HOSPADM

## 2025-01-02 RX ORDER — HEPARIN SODIUM 5000 [USP'U]/.5ML
5000 INJECTION, SOLUTION INTRAVENOUS; SUBCUTANEOUS EVERY 8 HOURS
Status: CANCELLED | OUTPATIENT
Start: 2025-01-03

## 2025-01-02 RX ORDER — HYDROMORPHONE HYDROCHLORIDE 1 MG/ML
0.4 INJECTION, SOLUTION INTRAMUSCULAR; INTRAVENOUS; SUBCUTANEOUS
Status: CANCELLED | OUTPATIENT
Start: 2025-01-02

## 2025-01-02 RX ORDER — HYDRALAZINE HYDROCHLORIDE 20 MG/ML
10 INJECTION INTRAMUSCULAR; INTRAVENOUS EVERY 30 MIN PRN
Status: DISCONTINUED | OUTPATIENT
Start: 2025-01-02 | End: 2025-01-14

## 2025-01-02 RX ORDER — NICOTINE POLACRILEX 4 MG
15-30 LOZENGE BUCCAL
Status: DISCONTINUED | OUTPATIENT
Start: 2025-01-02 | End: 2025-01-07

## 2025-01-02 RX ORDER — EPINEPHRINE IN 0.9 % SOD CHLOR 5 MG/250ML
.01-.3 PLASTIC BAG, INJECTION (ML) INTRAVENOUS CONTINUOUS
Status: CANCELLED | OUTPATIENT
Start: 2025-01-02

## 2025-01-02 RX ORDER — SERTRALINE HYDROCHLORIDE 100 MG/1
100 TABLET, FILM COATED ORAL DAILY
Status: DISCONTINUED | OUTPATIENT
Start: 2025-01-02 | End: 2025-01-03

## 2025-01-02 RX ORDER — CHLORHEXIDINE GLUCONATE ORAL RINSE 1.2 MG/ML
15 SOLUTION DENTAL EVERY 12 HOURS
Status: DISCONTINUED | OUTPATIENT
Start: 2025-01-02 | End: 2025-01-11

## 2025-01-02 RX ORDER — PROPOFOL 10 MG/ML
5-75 INJECTION, EMULSION INTRAVENOUS CONTINUOUS
Status: DISCONTINUED | OUTPATIENT
Start: 2025-01-02 | End: 2025-01-03

## 2025-01-02 RX ORDER — NOREPINEPHRINE BITARTRATE 0.06 MG/ML
.01-.6 INJECTION, SOLUTION INTRAVENOUS CONTINUOUS
Status: DISCONTINUED | OUTPATIENT
Start: 2025-01-02 | End: 2025-01-03

## 2025-01-02 RX ORDER — NOREPINEPHRINE BITARTRATE 0.06 MG/ML
.01-.15 INJECTION, SOLUTION INTRAVENOUS CONTINUOUS
Status: CANCELLED | OUTPATIENT
Start: 2025-01-02

## 2025-01-02 RX ORDER — AMOXICILLIN 250 MG
1 CAPSULE ORAL 2 TIMES DAILY
Status: DISCONTINUED | OUTPATIENT
Start: 2025-01-02 | End: 2025-01-04

## 2025-01-02 RX ORDER — NALOXONE HYDROCHLORIDE 0.4 MG/ML
0.2 INJECTION, SOLUTION INTRAMUSCULAR; INTRAVENOUS; SUBCUTANEOUS
Status: DISCONTINUED | OUTPATIENT
Start: 2025-01-02 | End: 2025-01-25 | Stop reason: HOSPADM

## 2025-01-02 RX ORDER — NOREPINEPHRINE BITARTRATE 0.06 MG/ML
.01-.6 INJECTION, SOLUTION INTRAVENOUS CONTINUOUS
Status: DISCONTINUED | OUTPATIENT
Start: 2025-01-02 | End: 2025-01-02

## 2025-01-02 RX ORDER — CALCIUM GLUCONATE 20 MG/ML
2 INJECTION, SOLUTION INTRAVENOUS EVERY 6 HOURS PRN
Status: DISPENSED | OUTPATIENT
Start: 2025-01-02 | End: 2025-01-08

## 2025-01-02 RX ORDER — CALCIUM GLUCONATE 20 MG/ML
1 INJECTION, SOLUTION INTRAVENOUS EVERY 6 HOURS PRN
Status: DISPENSED | OUTPATIENT
Start: 2025-01-02 | End: 2025-01-08

## 2025-01-02 RX ORDER — DEXTROSE MONOHYDRATE 100 MG/ML
INJECTION, SOLUTION INTRAVENOUS CONTINUOUS PRN
Status: DISCONTINUED | OUTPATIENT
Start: 2025-01-02 | End: 2025-01-07

## 2025-01-02 RX ORDER — HYDROMORPHONE HYDROCHLORIDE 1 MG/ML
0.2 INJECTION, SOLUTION INTRAMUSCULAR; INTRAVENOUS; SUBCUTANEOUS
Status: CANCELLED | OUTPATIENT
Start: 2025-01-02

## 2025-01-02 RX ORDER — DEXTROSE MONOHYDRATE 25 G/50ML
25-50 INJECTION, SOLUTION INTRAVENOUS
Status: DISCONTINUED | OUTPATIENT
Start: 2025-01-02 | End: 2025-01-07

## 2025-01-02 RX ORDER — OXYCODONE HYDROCHLORIDE 5 MG/1
5 TABLET ORAL EVERY 4 HOURS PRN
Status: DISCONTINUED | OUTPATIENT
Start: 2025-01-02 | End: 2025-01-17

## 2025-01-02 RX ORDER — ASPIRIN 81 MG/1
81 TABLET, CHEWABLE ORAL DAILY
Status: DISCONTINUED | OUTPATIENT
Start: 2025-01-03 | End: 2025-01-03

## 2025-01-02 RX ORDER — ROSUVASTATIN CALCIUM 20 MG/1
40 TABLET, COATED ORAL DAILY
Status: DISCONTINUED | OUTPATIENT
Start: 2025-01-02 | End: 2025-01-03

## 2025-01-02 RX ORDER — ASPIRIN 81 MG/1
162 TABLET, CHEWABLE ORAL DAILY
Status: CANCELLED | OUTPATIENT
Start: 2025-01-02

## 2025-01-02 RX ORDER — MIDAZOLAM HCL IN 0.9 % NACL/PF 1 MG/ML
1-8 PLASTIC BAG, INJECTION (ML) INTRAVENOUS CONTINUOUS
Status: DISCONTINUED | OUTPATIENT
Start: 2025-01-02 | End: 2025-01-03

## 2025-01-02 RX ORDER — ACETAMINOPHEN 325 MG/1
975 TABLET ORAL EVERY 8 HOURS
Status: CANCELLED | OUTPATIENT
Start: 2025-01-02

## 2025-01-02 RX ORDER — ONDANSETRON 4 MG/1
4 TABLET, ORALLY DISINTEGRATING ORAL EVERY 6 HOURS PRN
Status: DISCONTINUED | OUTPATIENT
Start: 2025-01-02 | End: 2025-01-25 | Stop reason: HOSPADM

## 2025-01-02 RX ORDER — HEPARIN SOD,PORCINE/0.9 % NACL 30K/1000ML
INTRAVENOUS SOLUTION INTRAVENOUS ONCE
Status: DISCONTINUED | OUTPATIENT
Start: 2025-01-02 | End: 2025-01-02

## 2025-01-02 RX ORDER — CEFAZOLIN SODIUM 1 G/3ML
1 INJECTION, POWDER, FOR SOLUTION INTRAMUSCULAR; INTRAVENOUS EVERY 8 HOURS
Status: CANCELLED | OUTPATIENT
Start: 2025-01-02 | End: 2025-01-03

## 2025-01-02 RX ORDER — LIDOCAINE 40 MG/G
CREAM TOPICAL
Status: CANCELLED | OUTPATIENT
Start: 2025-01-02

## 2025-01-02 RX ORDER — PANTOPRAZOLE SODIUM 40 MG/1
40 TABLET, DELAYED RELEASE ORAL DAILY
Status: DISCONTINUED | OUTPATIENT
Start: 2025-01-03 | End: 2025-01-02

## 2025-01-02 RX ORDER — DEXMEDETOMIDINE HYDROCHLORIDE 4 UG/ML
.2-.7 INJECTION, SOLUTION INTRAVENOUS CONTINUOUS
Status: CANCELLED | OUTPATIENT
Start: 2025-01-02

## 2025-01-02 RX ORDER — BISACODYL 10 MG
10 SUPPOSITORY, RECTAL RECTAL DAILY PRN
Status: CANCELLED | OUTPATIENT
Start: 2025-01-05

## 2025-01-02 RX ORDER — DEXTROSE MONOHYDRATE 25 G/50ML
25-50 INJECTION, SOLUTION INTRAVENOUS
Status: DISCONTINUED | OUTPATIENT
Start: 2025-01-02 | End: 2025-01-02

## 2025-01-02 RX ORDER — PROCHLORPERAZINE MALEATE 5 MG/1
5 TABLET ORAL EVERY 6 HOURS PRN
Status: DISCONTINUED | OUTPATIENT
Start: 2025-01-02 | End: 2025-01-12

## 2025-01-02 RX ORDER — OXYCODONE HYDROCHLORIDE 10 MG/1
10 TABLET ORAL EVERY 4 HOURS PRN
Status: DISCONTINUED | OUTPATIENT
Start: 2025-01-02 | End: 2025-01-12

## 2025-01-02 RX ORDER — ONDANSETRON 2 MG/ML
4 INJECTION INTRAMUSCULAR; INTRAVENOUS EVERY 6 HOURS PRN
Status: DISCONTINUED | OUTPATIENT
Start: 2025-01-02 | End: 2025-01-20

## 2025-01-02 RX ORDER — POLYETHYLENE GLYCOL 3350 17 G/17G
17 POWDER, FOR SOLUTION ORAL DAILY
Status: DISCONTINUED | OUTPATIENT
Start: 2025-01-03 | End: 2025-01-04

## 2025-01-02 RX ORDER — NICOTINE POLACRILEX 4 MG
15-30 LOZENGE BUCCAL
Status: DISCONTINUED | OUTPATIENT
Start: 2025-01-02 | End: 2025-01-02

## 2025-01-02 RX ORDER — FIBRINOGEN (HUMAN) 700-1300MG
1150 KIT INTRAVENOUS ONCE
Status: DISCONTINUED | OUTPATIENT
Start: 2025-01-02 | End: 2025-01-02

## 2025-01-02 RX ORDER — VANCOMYCIN HYDROCHLORIDE 1 G/200ML
1000 INJECTION, SOLUTION INTRAVENOUS EVERY 12 HOURS
Status: DISCONTINUED | OUTPATIENT
Start: 2025-01-02 | End: 2025-01-03

## 2025-01-02 RX ADMIN — BACLOFEN 20 MG: 20 TABLET ORAL at 20:00

## 2025-01-02 RX ADMIN — POTASSIUM PHOSPHATE, MONOBASIC POTASSIUM PHOSPHATE, DIBASIC 9 MMOL: 224; 236 INJECTION, SOLUTION, CONCENTRATE INTRAVENOUS at 05:32

## 2025-01-02 RX ADMIN — POTASSIUM & SODIUM PHOSPHATES POWDER PACK 280-160-250 MG 1 PACKET: 280-160-250 PACK at 23:36

## 2025-01-02 RX ADMIN — SODIUM CHLORIDE, POTASSIUM CHLORIDE, SODIUM LACTATE AND CALCIUM CHLORIDE 1000 ML: 600; 310; 30; 20 INJECTION, SOLUTION INTRAVENOUS at 18:38

## 2025-01-02 RX ADMIN — VANCOMYCIN HYDROCHLORIDE 1000 MG: 1 INJECTION, SOLUTION INTRAVENOUS at 22:24

## 2025-01-02 RX ADMIN — METHOCARBAMOL TABLETS 500 MG: 500 TABLET, COATED ORAL at 20:00

## 2025-01-02 RX ADMIN — PROPOFOL 30 MCG/KG/MIN: 10 INJECTION, EMULSION INTRAVENOUS at 19:52

## 2025-01-02 RX ADMIN — CHLORHEXIDINE GLUCONATE 15 ML: 1.2 SOLUTION ORAL at 20:01

## 2025-01-02 RX ADMIN — POLYETHYLENE GLYCOL 3350 17 G: 17 POWDER, FOR SOLUTION ORAL at 20:01

## 2025-01-02 RX ADMIN — MIDAZOLAM IN SODIUM CHLORIDE 4 MG/HR: 1 INJECTION INTRAVENOUS at 05:59

## 2025-01-02 RX ADMIN — AMIODARONE HYDROCHLORIDE 0.5 MG/MIN: 50 INJECTION, SOLUTION INTRAVENOUS at 02:06

## 2025-01-02 RX ADMIN — Medication 1 UNITS/HR: at 21:17

## 2025-01-02 RX ADMIN — SENNOSIDES AND DOCUSATE SODIUM 2 TABLET: 50; 8.6 TABLET ORAL at 20:00

## 2025-01-02 RX ADMIN — Medication 150 MCG/HR: at 21:19

## 2025-01-02 RX ADMIN — MIDAZOLAM IN SODIUM CHLORIDE 3 MG/HR: 1 INJECTION INTRAVENOUS at 21:20

## 2025-01-02 RX ADMIN — PROPOFOL 50 MCG/KG/MIN: 10 INJECTION, EMULSION INTRAVENOUS at 06:16

## 2025-01-02 RX ADMIN — NOREPINEPHRINE BITARTRATE 0.05 MCG/KG/MIN: 0.06 INJECTION, SOLUTION INTRAVENOUS at 21:20

## 2025-01-02 RX ADMIN — INSULIN HUMAN 1.5 UNITS/HR: 1 INJECTION, SOLUTION INTRAVENOUS at 19:55

## 2025-01-02 RX ADMIN — SODIUM CHLORIDE, POTASSIUM CHLORIDE, SODIUM LACTATE AND CALCIUM CHLORIDE 500 ML: 600; 310; 30; 20 INJECTION, SOLUTION INTRAVENOUS at 21:34

## 2025-01-02 RX ADMIN — Medication 10 MG: at 21:15

## 2025-01-02 RX ADMIN — PROPOFOL 50 MCG/KG/MIN: 10 INJECTION, EMULSION INTRAVENOUS at 03:29

## 2025-01-02 RX ADMIN — SODIUM NITROPRUSSIDE 0.25 MCG/KG/MIN: 25 INJECTION, SOLUTION, CONCENTRATE INTRAVENOUS at 04:20

## 2025-01-02 RX ADMIN — INSULIN HUMAN 1.5 UNITS/HR: 1 INJECTION, SOLUTION INTRAVENOUS at 21:19

## 2025-01-02 RX ADMIN — CEFTRIAXONE SODIUM 2 G: 2 INJECTION, POWDER, FOR SOLUTION INTRAMUSCULAR; INTRAVENOUS at 19:18

## 2025-01-02 RX ADMIN — PROPOFOL 40 MCG/KG/MIN: 10 INJECTION, EMULSION INTRAVENOUS at 21:21

## 2025-01-02 RX ADMIN — Medication 200 MCG/HR: at 06:13

## 2025-01-02 ASSESSMENT — ACTIVITIES OF DAILY LIVING (ADL)
ADLS_ACUITY_SCORE: 47
ADLS_ACUITY_SCORE: 45
ADLS_ACUITY_SCORE: 73
ADLS_ACUITY_SCORE: 45
ADLS_ACUITY_SCORE: 73

## 2025-01-02 NOTE — BRIEF OP NOTE
[facility]    Brief Operative Note    Pre-operative diagnosis: Cardiogenic shock (H) [R57.0]  Post-operative diagnosis Same as pre-operative diagnosis    Procedure: Median Sternotomy, on Cardiopulmonary Bypass Pump, Left Internal Mammary Artery Redding, Endoscopic Redding of Left Greater Saphenous Vein, Coronary Artery Bypass Graft x 4, Left Atrial Appendage Ligation, Left Atrial Ablation, and Intraoperative Transesophageal Echocardiogram By Anesthesia, N/A - Chest  LIMA to LAD, SVG to ANCELMO, SVG to OM, SVG to PDA  Surgeon: Surgeons and Role:     * Bernice Rosenbaum MD - Primary     * Maria Luz Lozada MD - Assisting     * Sonia Fajardo PA-C - Assisting  Anesthesia: General   Estimated Blood Loss: see anesthesia report     Drains: Two 32F mediastinals and 28F left pleural   Specimens: * No specimens in log *  Findings:   See op report  .  Complications: See op report  .  Implants:   Implant Name Type Inv. Item Serial No.  Lot No. LRB No. Used Action   IMP ATRICLIP FLEX V DEVICE PAULETTE EXLUSION 50MM [id number] - [id number] Clip IMP ATRICLIP FLEX V DEVICE PAULETTE EXLUSION 50MM [id number]  [employer] [id number] N/A 1 Implanted         Sonia Schulz PA-C  Cardiothoracic Surgery  Pager [phone]  January 2, 2025

## 2025-01-02 NOTE — PROGRESS NOTES
"CLINICAL NUTRITION SERVICES - ASSESSMENT NOTE     Nutrition Prescription    RECOMMENDATIONS FOR MDs/PROVIDERS TO ORDER:  If tube feeds are desired, send nutrition consult for \"Registered Dietitian to Order TF per Medical Nutrition Therapy Guidelines\".    Malnutrition Status:    Unable to determine due to unable to complete NFPE and history, will follow up    Recommendations already ordered by Registered Dietitian (RD):  None currently     Future/Additional Recommendations:  Monitor pressor support and propofol infusion    If enteral nutrition support becomes plan of care, recommend:   EN access: OGT   Goal TF: Pivot 1.5 Lalito (or equivalent) @ goal of  55ml/hr  (1320ml/day) + 2 Prosource TF20 provides: 2140 kcals, 163 g PRO, 990 ml free H20, 227 g CHO, and 9 g fiber daily.   Initiate @ 10 ml/hr and advance by 10 ml Q8H pending pt's tolerance.  Do not advance TF rate unless Mg++ > 1.5, K+ is > 3, and phos > 1.9  30 ml Q4H fluid flushes for tube patency. Additional fluids and/or adjustments per MD.    Multivitamin/minerals to help ensure micronutrient needs being met with suspected hypermetabolic demands and potential interruptions to TF infusions.   100 mg Thiamine x 5-7 days to prevent lyte depletion d/t at risk for refeeding syndrome  If gastric enteral access: HOB > 30 degrees        REASON FOR ASSESSMENT  Erwin Aguilar is a/an 64 year old male assessed by the dietitian for  RD initiated, new ECMO.    Patient admitted for arm pain, vomiting, and diarrhea with c/f ACS. Pt cannulated for VA ECMO.    MEDICAL HISTORY  DM2, HTN, PVD, and neuropathic pain    NUTRITION HISTORY  RIKI, patient currently in OR. Will follow up for recent nutrition history as patient/family are available to provide.     CURRENT NUTRITION ORDERS  Diet: NPO    Intake/Tolerance: NPO since 12/31    LABS   01/01/25 11:55 01/01/25 16:07 01/01/25 20:38 01/01/25 21:28 01/02/25 03:33 01/02/25 09:00   Sodium 140 140  141 140    Sodium POCT      142 " "  Potassium 3.7 4.0  3.8 4.1    Potassium POCT      4.0   Chloride 110 (H) 109 (H)  109 (H) 109 (H)    Carbon Dioxide (CO2) 21 (L) 20 (L)  21 (L) 22    Urea Nitrogen 5.4 (L) 5.8 (L)  7.3 (L) 8.3    Creatinine 0.66 (L) 0.70  0.70 0.68    GFR Estimate >90 >90  >90 >90    Calcium 8.1 (L) 8.1 (L)  8.2 (L) 8.1 (L)    Anion Gap 9 11  11 9    Magnesium 2.0 2.0  1.9 2.3    Phosphorus    2.4 (L) 2.6    Albumin 3.1 (L) 3.1 (L)  3.0 (L) 3.1 (L)    Protein Total 5.4 (L) 5.4 (L)  5.3 (L) 5.3 (L)    Alkaline Phosphatase 92 88  84 82    ALT 23 21  19 18     (H) 104 (H)  84 (H) 67 (H)    Bilirubin Total 0.3 0.3  0.3 0.3    Calcium Ionized Whole Blood 4.6 4.5 4.7  4.6    Calcium, Ionized Whole Blood POCT      4.6   CRP Inflammation     105.00 (H)    Free T3     1.5 (L)    Glucose 108 (H) 99  116 (H) 128 (H)    Hemoglobin Plasma     40 (H)    Lactic Acid 0.5 (L) 0.4 (L) 0.6 (L)  0.6 (L)        MEDICATIONS  Drips: amiodarone; fentanyl; propofol @ 22.8 ml/h; versed; norepi (rate N/A); vaso (rate N/A)    ANTHROPOMETRICS  Height: 180.3 cm (5' 11\")  Most Recent Weight: 78.6 kg (173 lb 4.5 oz) (Weight is up 2 kg from yesterday)    IBW: 78.2 kg   Body mass index is 24.17 kg/m . BMI Category: Normal BMI  Weight History:  13 kg (14%) weight loss over 6 months.  Wt Readings from Last 20 Encounters:   01/02/25 78.6 kg (173 lb 4.5 oz)   08/05/24 88.1 kg (194 lb 4.8 oz)   07/23/24 91.6 kg (202 lb)   06/04/24 83.9 kg (185 lb)   05/09/24 92 kg (202 lb 13.2 oz)   05/06/24 89.8 kg (198 lb)   05/01/24 89.8 kg (198 lb)   04/23/24 94.3 kg (207 lb 14.3 oz)   04/18/24 91.7 kg (202 lb 2.6 oz)   03/28/24 92 kg (202 lb 12.8 oz)   02/16/24 88 kg (194 lb)   02/02/24 93 kg (205 lb)   01/09/24 93.2 kg (205 lb 6.4 oz)   08/31/23 99.3 kg (219 lb)   08/10/23 99.4 kg (219 lb 2.2 oz)   08/10/23 99.4 kg (219 lb 3.2 oz)   07/03/23 97.5 kg (215 lb)   05/04/23 98.4 kg (217 lb)   05/01/23 98.7 kg (217 lb 11.2 oz)   03/22/23 93 kg (205 lb)     ASSESSED NUTRITION " "NEEDS  Dosing Weight: 78.6 kg (Actual BW)   Estimated Energy Needs: 5775-4172 kcals/day (25 - 30 kcals/kg)  Justification:  ECMO, Increased needs, and Critical illness  Estimated Protein Needs: 142-197 grams protein/day (1.8 - 2.5 grams of pro/kg)  Justification:  ECMO, Increased needs, and Critical illness  Estimated Fluid Needs: 6241-6216 mL/day (1 mL/kcal)   Justification: Maintenance and Per provider pending fluid status    PHYSICAL FINDINGS  See malnutrition section below.    Per EMR:   Dani Score: 10    MALNUTRITION  % Intake: Unable to assess  % Weight Loss: > 10% in 6 months (severe malnutrition)  Subcutaneous Fat Loss: Unable to assess   Muscle Loss: Unable to assess  Fluid Accumulation/Edema: None noted  Malnutrition Diagnosis: Unable to determine due to unable to complete NFPE and history, will follow up    NUTRITION DIAGNOSIS  Inadequate oral intake related to intubation as evidenced by NPO without nutrition support.      INTERVENTIONS  Implementation  Enteral Nutrition - recommendations provided      Goals  Diet adv v nutrition support within 48 hours of admission to ICU.      Monitoring/Evaluation  Progress toward goals will be monitored and evaluated per protocol.    Vicky Mcgovern, MS, RDN, LD  Vocera - \"4A Clinical Dietitian\"  Weekend/Holiday RD - \"Weekend Clinical Dietitian\"    "

## 2025-01-02 NOTE — PLAN OF CARE
Goal Outcome Evaluation:      Plan of Care Reviewed With: patient    Overall Patient Progress: no changeOverall Patient Progress: no change    Outcome Evaluation: Labile BP this shift with Nipride Gtt restarted and titrated up/down between off and 1.5 mcg/kg/min to keep SBP < 130 and MAP > 65.  Rodriguez dark cory /tea colored urine output marginal  at 25 to 35 ml/hr.    D/I:  Neuro:  OGLESBY to pain but does not follow requests @ current level of sedation.  Afebrile.  CV:  SR 60-90's with no ectopy.  VA-ECMO:  FIO2= 50%.  Flow= 3 lpm.  Speed= 3,300 rpm.  Sweep= 0.5 lpm.  Labile BP: Nipride Gtt restarted, titrated up/down between OFF and 1.5 mcg/kg/min to keep SBP < 130 & MAP > 65.  Blood Products & Boluses:  None.  Lytes Replaced:  Magnesium Sulfate 2 Gm IV x 1,  KCL 20 mEq IV x 1 and Potassium Phosphate 9 mmol IV x 1.  Pulm:  Vent Mode: CMV 40%/14/430/8.  GI:  Bowel sounds hypoactive.  Last BM prior to admission.  OG @ LIS with 275 ml output this shift.  :   Rodriguez with 25 - 30 ml/hr dark cory/tea colored urine.  Skin:  Scabs on chin and right temple.Right temple.  Blister on first finger of right hand.  Blanchable erythema on sacrum/coccyx.  Healed right BKA.  Drips:  Propofol @ 50 mcg/kg/min.  Fentanyl @ 200 mcg/hr.  Versed @ 4 mg/hr.  Amiodarone @ 0.5 mcg/min.  Nipride @ OFF keeping SBP < 130.  Labs:   2200 K+=  3.8 > 20 mEq IV KCL > 0400 K+= 4.1.  2200 MG+= 1.9 > 2 Gm IV Magnesium Sulfate > 0400 Mg+= 2.3.  0400 Phos level= 2.6 > 9 mmol Potassium Phosphate.  0400 Hgb= 9.4.  0400 Platelet= 110.  A/P:  Keep MAP > 65 and SBP < 130.  Pt on the OR schedule at 0750 for a CABG x 4 today 01/02/2024 with Dr. ANTHONY Rosenbaum.

## 2025-01-02 NOTE — PROGRESS NOTES
[facility]    ECLS Shift Summary:     ECMO Equipment:  Console Serial Number: [id number]  Circuit Lot Number: [id number]  Oxygenator Lot Number: [id number]          Circuit Assessment: Fibrin  Fibrin Location: connectors    Arterial ECMO Cannula: 17 Fr in the Right Femoral Artery  Venous ECMO Cannula: 25 Fr in the Right Femoral Vein  Distal Perfusion Catheter: 8 Fr in the Right Superficial Femoral Artery    ECMO Cannula Venous Right femoral vein-Site Assessment: WDL  ECMO Cannula Arterial Right femoral artery-Site Assessment: WDL  Distal Perfusion Catheter Right superficial femoral artery-Site Assessment: WDL  ECMO Cannula Venous Right femoral vein-Site Intervention: No intervention needed  ECMO Cannula Arterial Right femoral artery-Site Intervention: No intervention needed  Distal Perfusion Catheter Right superficial femoral artery-Site Intervention: No intervention needed    Patient remains on V-A ECMO, all equipment is functioning and alarms are appropriately set. RPM's: 3200 with Blood Flow (Circuit) LPM  Avg: 3 LPM  Min: 2.84 LPM  Max: 3.12 LPM L/min. Sweep is at 0.75 LPM and 60 %. Extremities are warm.     Significant Shift Events: Attempted to utilize ECMO flows to optimize BP and avoid the need for vasopressors and vasodilators. Pts had HTN, flows dropped to 3 but it did not make a difference. Flows at 3lpm per team so we can use Nipride for HTN. Nipride currently off with flows at 3.5LPM    Vent settings:  FiO2 (%): 40 %, Resp: 14, Vent Mode: CMV/AC, Resp Rate (Set): 14 breaths/min, Tidal Volume (Set, mL): 430 mL, PEEP (cm H2O): 8 cmH2O, Resp Rate (Set): 14 breaths/min, Tidal Volume (Set, mL): 430 mL, PEEP (cm H2O): 8 cmH2O    Anticoagulation:  Dose (units/hr) HEParin: 1800 Units/hr  Rate (mL/hr) HEParin: 18 mL/hr  Concentration HEParin: 100 Units/mL        Most recent: ACT  (seconds): 182 seconds    Urine output is minimal. clear cory w/o foul odor', refer to RN  I&O's  Blood loss was minimal. No product given.     Intake/Output Summary (Last 24 hours) at 1/2/2025 0619  Last data filed at 1/2/2025 0600  Gross per 24 hour   Intake 2656.57 ml   Output 1154 ml   Net 1502.57 ml       Labs:  Recent Labs   Lab 01/02/25  0537 01/02/25  0333 01/02/25  0128 01/01/25  2351   PH 7.43 7.41 7.38 7.37   PCO2 37 38 39 40   PO2 134* 93 82 103   HCO3 24 24 23 23   O2PER 40 60  40  40 40 40       Lab Results   Component Value Date    HGB 9.4 (L) 01/02/2025    PHGB 40 (H) 01/02/2025     (L) 01/02/2025    FIBR 460 01/02/2025    INR 1.18 (H) 01/02/2025     (HH) 01/02/2025    DD 0.69 (H) 01/02/2025    ANTCH 70 (L) 01/01/2025       Plan is to remain a CABAG with Dr. Rosenbaum with potential decannulation.    Marylou Shelby RN  ECMO Specialist  1/2/2025 6:19 AM

## 2025-01-02 NOTE — H&P
CV ICU H&P        ASSESSMENT: Erwin Aguilar is a 64 year old male with a past medical history of DM2, HTN, PVD, and neuropathic pain, who presented to the emergency department on the [place] here at the  for c/o arm pain, vomiting, and diarrhea with c/f ACS. Patient was evaluated by ED staff and 12 lead demonstrated some ST depression in the lateral leads which was communicated to interventional staff, after decision was made to go to the cath lab patient had a VT cardiac arrest which resolved with 1 round of CPR, epinephrine, defibrillation. Patient's was intubated for airway protection and his rhythm after ROSC was afib with RVR. He had an adequate BP, however after rapid transport to the cath lab patient became hypotensive and was subsequently cannulated for VA ECMO. Now day of surgery for a CABG x4 by Dr. Rosenbaum 01/02/24. Returned to OR on VA ECMO.     CO-MORBIDITIES:   Patient Active Problem List   Diagnosis    Generalized anxiety disorder    Generalized muscle weakness    Type 2 diabetes, HbA1c goal < 7% (H)    Hyperlipidemia LDL goal <100    Hypertension goal BP (blood pressure) < 140/90    Major depressive disorder, recurrent episode, mild (H)    Chronic pain syndrome    GERD (gastroesophageal reflux disease)    Abnormal liver function tests    Abnormal EMG    Hernia    Diastasis recti    Hypokalemia    Wound infection    Cellulitis    Nausea without vomiting    Atopic dermatitis    Muscle spasms of Upper extremity    Edema of left lower extremity    Anemia due to blood loss, acute    Skin infection    Superficial thrombophlebitis of arm    Cellulitis of hand    Ganglion cyst    Dry skin dermatitis    Moderate persistent asthma    Fall    Closed fracture of right patella    Right knee pain    Lumbosacral radiculopathy    Calculus of gallbladder without cholecystitis without obstruction    Type 2 diabetes mellitus without complication, with long-term current use of insulin (H)    Wound, open, buttock,  right    Type 2 diabetes mellitus with diabetic polyneuropathy, with long-term current use of insulin (H)    Benign neoplasm of skin of trunk, except scrotum    Hematemesis with nausea    Osteoarthritis of lumbar spine, unspecified spinal osteoarthritis complication status    S/P insertion of spinal cord stimulator    Gastroparesis    Alcoholism in remission (H)    Right ankle pain    Acquired calcaneus deformity of right ankle    H/O foot surgery    Calcific Achilles tendinitis    Paroxysmal atrial fibrillation (H)    Adjustment disorder with anxious mood    Psychophysiological insomnia    At risk for prolonged QT interval syndrome    Cubital tunnel syndrome on left    Left carpal tunnel syndrome    Carpal tunnel syndrome of left wrist    Transient ischemic attack (TIA)    Stress    Physical deconditioning    Noninfectious gastroenteritis and colitis    Neuropathy in diabetes (H)    Neuropathic pain syndrome (non-herpetic)    Neurogenic bowel    Neurogenic bladder    Gait abnormality    Foot-drop    CRPS (complex regional pain syndrome type I)    Contact with and (suspected) exposure to covid-19    Skin ulcer of right ankle with necrosis of muscle (H)    Chronic pain of left thumb    Orthopedic aftercare    PVD (peripheral vascular disease) (H)    PAD (peripheral artery disease) (H)    Atherosclerosis of native arteries of extremities with rest pain, other extremity (H)    Surgery, elective    Coronary artery disease involving native coronary artery of native heart with angina pectoris (H)    Wound dehiscence    Nonhealing surgical wound    ST elevation MI (STEMI) (H)     Intra-op:   - Arrived @1730  - Cell-saver: 740 // Blood products: 2 units PRBC // Kcentra: 1000   - UOP: 5 // Given 3L IVF throughout case    Patient centrally cannulated. Became hypotensive with increasing pressors and lactate so returned to ICU on VA ECMO. Tentatively planning to de cannulate tomorrow.     PLAN:  Neuro/ pain/ sedation:  # Acute  postoperative pain  # Chronic pain with opioid dependence  # Neuropathy  - Fentanyl gtt  - Scheduled: Tylenol, Robaxin (PTA), Lidocaine patches, Baclofen  - PRN: Tylenol, oxycodone, Dilaudid  - Holding PTA Gabapentin and oxycodone     # Generalized Anxiety  - Continue PTA Sertraline  - Hold PTA Hydroxyzine    # Sedation while on mechanical ventilation   - Gtt: Propofol  - Versed gtt    # Encephalopathy  - Prior to procedure  - vEEG without seizure activity and CTH negative for acute findings.    Pulmonary care:   # Probable Aspiration PNA   # Acute Hypoxic Respiratory failure   # Post-operative mechanical ventilation   FiO2 (%): 40 %, Resp: 15, Vent Mode: CMV/AC, Resp Rate (Set): 14 breaths/min, Tidal Volume (Set, mL): 430 mL, PEEP (cm H2O): 8 cmH2O, Resp Rate (Set): 14 breaths/min, Tidal Volume (Set, mL): 430 mL, PEEP (cm H2O): 8 cmH2O   - Goal SpO2 > 92%  - CXR on arrival, then daily   - Monitor CT output   - Wean vent as able; Trend ABG's     Cardiovascular:    # Shock, cardiogenic, vasoplegic  # HTN  # VT Cardiac Arrest on VA ECMO (01/01/24 - present)  # CAD s/p CABG x4  # Dyslipidemia  # Cardiomyopathy   Post CPBP: Echo 35-40%, mild MR, RV   - Monitor hemodynamics closely. Goal MAP >65, SBP <140   - Epi, NE, Vaso gtts for inotropic and pressor support, wean as able  - ASA + statin (PTA)  - Continue Amiodarone gtt   - Hold Heparin gtt tonight  - Centrally cannulated on ECMO. Plan is to RTOR for decannulation tomorrow, 01/03.  - A/V wires  - Hold PTA apixaban, Dyazide, propanolol     GI care / Nutrition:   # At risk for protein calorie malnutrition   - Diet: NPO  - PPI  - Last BM: PTA. Bowel regimen: MiraLAX, senna    Renal / Fluids / Electrolytes:   # Acute Kidney Injury 2/2 cardiac arrest/shock   BL creat appears to be ~ 0.6-0.8  - Strict I/O, daily weights, avoid/limit nephrotoxins  - Replete lytes PRN per protocol    # Lactic Acidosis  - Trend lactate [id number] tonight      Endocrine:    # Stress  hyperglycemia  # Type 2 Diabetes Mellitus   - Preop A1c 5.7  - Insulin gtt  - Goal BG <180 for optimal healing  - Hold PTA Lantus    ID / Antibiotics:  # Stress induced leukocytosis  # Aspiration Pneumonia, concern for  - Ceftriaxone (x5 days) started prior to CABG.   - Monitor fever curve, WBC, and inflammatory markers as appropriate    Cultures  - 12/31 sputum: Klebsiella pneumonia  - 12/31 MRSA and blood cultures negative    Heme:     # Acute blood loss anemia  # Acute thrombocytopenia  No s/sx active bleeding  - Trending CBC   - Hgb goal > 7.0, transfuse PRN     MSK / Skin:  # Sternotomy   # Surgical Incision  - Sternal precautions, postop incision management protocol  - PT/OT/CR    #PVD  - s/p right BKA    Prophylaxis:     - DVT: Mechanical  - GI: PPI    Lines / Tubes / Drains:  - ETT  - RIJ CVC, PA catheter  - Left femoral CVC  - Right radial Arterial Line  - CTs x 3 (Left pleural and x2 meds)  - A/V wires  - Rodriguez  - OG  - Centrally cannulated    Disposition: CVICU      Patient seen, findings and plan discussed with CVICU staff and cardiothoracic surgeons and fellow.    40 minutes of CC times spent in the management of this patient excluding procedures same day.       Clinically Significant Risk Factors        # Hypokalemia: Lowest K = 2.9 mmol/L in last 2 days, will replace as needed   # Hyperchloremia: Highest Cl = 110 mmol/L in last 2 days, will monitor as appropriate      # Hypocalcemia: Lowest iCa = 4.1 mg/dL in last 2 days, will monitor and replace as appropriate   # Hypomagnesemia: Lowest Mg = 1.5 mg/dL in last 2 days, will replace as needed   # Hypoalbuminemia: Lowest albumin = 3 g/dL at 1/1/2025  9:28 PM, will monitor as appropriate  # Coagulation Defect: INR = 1.18 (Ref range: 0.85 - 1.15) and/or PTT = 182 Seconds (Ref range: 22 - 38 Seconds), will monitor for bleeding  # Thrombocytopenia: Lowest platelets = 104 in last 2 days, will monitor for bleeding   # Hypertension: Noted on problem list  #  Chronic heart failure with reduced ejection fraction: last echo with EF <40%    # Acute Hypoxic Respiratory Failure: Documented O2 saturation < 90%. Continue supplemental oxygen as needed             # Asthma: noted on problem list               ====================================    HPI:   Presents to CVICU intubated and sedated.      PAST MEDICAL HISTORY:   Past Medical History:   Diagnosis Date    Actinic keratosis     aldara    Anxiety 12/1997    Cancer (H)     squamous cell skin CA    Cauda equina spinal cord injury (H)     Chronic sinusitis 05/01/2016    Cirrhosis of liver (H) 04/01/2014    Not biopsy-proven as of 4/1/14.      Depressive disorder     Diastasis recti     Esophageal reflux     Esophageal varices in cirrhosis (H) 04/01/2014    Hospitalized for UGI blee 3/28/14, endoscopy revealed bleeding varices.    Essential hypertension, benign     Intermittent asthma     Mild depression     Mixed hyperlipidemia     Nasal polyps 05/01/2016    Osteoarthritis of lumbar spine, unspecified spinal osteoarthritis complication status     Other chronic pain     Paroxysmal atrial fibrillation (H) 09/22/2021    SCCA (squamous cell carcinoma) of skin     Seasonal allergic conjunctivitis     Type II or unspecified type diabetes mellitus without mention of complication, not stated as uncontrolled     Unspecified site of spinal cord injury without evidence of spinal bone injury     due to back surgery       PAST SURGICAL HISTORY:   Past Surgical History:   Procedure Laterality Date    ABDOMEN SURGERY  2014    AMPUTATE LEG BELOW KNEE Right 5/9/2024    Procedure: Right below knee amputation (transtibial amputation);  Surgeon: Kurtis Nye MD;  [facility]: [facility]    ANGIOGRAM Right 4/23/2024    Procedure: Ultrasound guided percutaneous transarterial left common femoral artery access, diagnostic aortoiliac angiogram, selective cannulation of right comon iliac, righ external iliac, right common femoral, and right superficial  femoral artery, balloon angioplasty of right superficial femoral artery, 6mm x 12 cm self-expanding drug-coated stent placement of right superficial femoral artery, lef    BACK SURGERY  August 2009    COLONOSCOPY N/A 5/12/2016    Procedure: COMBINED COLONOSCOPY, SINGLE OR MULTIPLE BIOPSY/POLYPECTOMY BY BIOPSY;  Surgeon: Ana Paula Urbina MD;  [facility]    DECOMPRESSION CUBITAL TUNNEL Left 8/31/2023    Procedure: LEFT CUBITAL TUNNEL RELEASE,;  Surgeon: Deny Dixon MD;  [facility]    ENDOSCOPY UPPER, COLONOSCOPY, COMBINED  10/19/2011    Procedure:COMBINED ENDOSCOPY UPPER, COLONOSCOPY; Upper Endoscopy, Colonoscopy with Polypectomy x4; Surgeon:AMBAR RODRÍGUEZIQ; Location[facility]    ENT SURGERY  1-2016    Ongoing since then    ESOPHAGOSCOPY, GASTROSCOPY, DUODENOSCOPY (EGD), COMBINED  3/28/2014    Procedure: COMBINED ESOPHAGOSCOPY, GASTROSCOPY, DUODENOSCOPY (EGD);  EGD, Gastric Biopsies, Esophageal Banding;  Surgeon: Reyna Tovar MD;  [facility]    ESOPHAGOSCOPY, GASTROSCOPY, DUODENOSCOPY (EGD), COMBINED  6/9/2014    Procedure: COMBINED ESOPHAGOSCOPY, GASTROSCOPY, DUODENOSCOPY (EGD);  Surgeon: Curtis Mendez MD;  [facility]    ESOPHAGOSCOPY, GASTROSCOPY, DUODENOSCOPY (EGD), COMBINED  7/24/2014    Procedure: COMBINED ESOPHAGOSCOPY, GASTROSCOPY, DUODENOSCOPY (EGD);  Surgeon: Gerard Samaniego MD;  [facility]    ESOPHAGOSCOPY, GASTROSCOPY, DUODENOSCOPY (EGD), COMBINED N/A 10/31/2014    Procedure: COMBINED ESOPHAGOSCOPY, GASTROSCOPY, DUODENOSCOPY (EGD);  Surgeon: Gerard Samaniego MD;  [facility]    ESOPHAGOSCOPY, GASTROSCOPY, DUODENOSCOPY (EGD), COMBINED N/A 5/12/2016    Procedure: COMBINED ESOPHAGOSCOPY, GASTROSCOPY, DUODENOSCOPY (EGD);  Surgeon: Ana Paula Urbina MD;  [facility]    ESOPHAGOSCOPY, GASTROSCOPY, DUODENOSCOPY (EGD), COMBINED N/A 8/2/2018    Procedure: COMBINED ESOPHAGOSCOPY, GASTROSCOPY, DUODENOSCOPY (EGD);  EGD;  Surgeon: Bernard  Yu Miller MD;  [facility]    HCL SQUAMOUS CELL CARCINOMA AG  10/07    left forearm    HERNIORRHAPHY UMBILICAL  11/8/2012    Procedure: HERNIORRHAPHY UMBILICAL;  Open Umbilical Hernia Repair With Mesh ;  Surgeon: Chase Nicholson MD;  [facility]    INSERT STIMULATOR DORSAL COLUMN N/A 6/28/2018    Procedure: INSERT STIMULATOR DORSAL COLUMN;;  Surgeon: Elvis Sauceda MD;  [facility]    IR LOWER EXTREMITY ANGIOGRAM RIGHT  4/23/2024    neuro stimulator  2010    RELEASE CARPAL TUNNEL Left 8/31/2023    Procedure: LEFT OPEN CARPAL TUNNEL RELEASE,;  Surgeon: Deny Dixon MD;  [facility]    RELEASE TRIGGER FINGER Left 8/31/2023    Procedure: Release left trigger finger thumb;  Surgeon: Deny Dixon MD;  Location: [facility]    REMOVE GENERATOR STIMULATOR (LOCATION) N/A 6/28/2018    Procedure: REMOVE GENERATOR STIMULATOR (LOCATION);  Spinal Cord Stimulator and IPG Explant and Re-Implant of SCS System (Leads and IPG);  Surgeon: Elvis Sauceda MD;  [facility]    REPAIR TENDON ACHILLES Right 11/11/2020    Procedure: Right achilles debridement and partial calcaneus excision;  Surgeon: Eh Sandoval MD;  Location: [facility]    SURGICAL HISTORY OF -   1/02    abcess tooth    SURGICAL HISTORY OF -   1999    L4-5 laminectomy, cauda equina syndrome    SURGICAL HISTORY OF -   +    herniated disk repair    TONSILLECTOMY  10 1964    TRANSPOSITION ULNAR NERVE (ELBOW)      right    ZZC APPENDECTOMY  1974       FAMILY HISTORY:   Family History   Problem Relation Age of Onset    Cancer Mother         lung    Breast Cancer Mother     Other Cancer Mother     Cancer Father         esophogeal, GERD    Snoring Father     Diabetes Brother         amputation, Type 1    Diabetes Brother     Diabetes Brother     Cancer - colorectal No family hx of     Glaucoma No family hx of     Macular Degeneration No family hx of     Anesthesia Reaction No family hx of     Venous  thrombosis No family hx of        SOCIAL HISTORY:   Social History     Tobacco Use    Smoking status: Former     Current packs/day: 0.00     Types: Cigarettes     Start date: 10/13/1983     Quit date: 1984     Years since quittin.5     Passive exposure: Past    Smokeless tobacco: Never   Substance Use Topics    Alcohol use: Not Currently     Comment: - last drink was 3/28/14         OBJECTIVE:  1. VITAL SIGNS:   Temp:  [98.4  F (36.9  C)-99  F (37.2  C)] 98.8  F (37.1  C)  Pulse:  [] 72  Resp:  [12-17] 15  MAP:  [61 mmHg-131 mmHg] 68 mmHg  Arterial Line BP: ()/() 102/58  FiO2 (%):  [40 %-50 %] 40 %  SpO2:  [92 %-100 %] 100 %    FiO2 (%): 40 %, Resp: 15, Vent Mode: CMV/AC, Resp Rate (Set): 14 breaths/min, Tidal Volume (Set, mL): 430 mL, PEEP (cm H2O): 8 cmH2O, Resp Rate (Set): 14 breaths/min, Tidal Volume (Set, mL): 430 mL, PEEP (cm H2O): 8 cmH2O      2. INTAKE/ OUTPUT:   I/O last 3 completed shifts:  In: 2193.75 [I.V.:1963.75; NG/GT:230]  Out: 1134 [Urine:634; Emesis/NG output:500]      3. PHYSICAL EXAMINATION:   Neuro: Intubated. Sedated. Unable to assess. NAD.   HEENT: Normocephalic, atraumatic. PERRL, and nonicteric.   CV: ECMO  Respiratory: Normal respiratory effort on ventilator equal chest rise b/l   GI: Abdomen soft, Non-distended   : ca  MSK: Unable to assess.  Right BKA  Skin:  Sternal incision with dressings in place clean/dry/intact  Normal color. No rashes or skin lesions.         4. INVESTIGATIONS:   Arterial Blood Gases   Recent Labs   Lab 25  0333 25  0128 25  2351 25  2128   PH 7.41 7.38 7.37 7.39   PCO2 38 39 40 39   PO2 93 82 103 76*   HCO3 24 23 23 24     Complete Blood Count   Recent Labs   Lab 25  0333 25  2128 25  1607 25  1155   WBC 6.6 6.9 5.3 6.0   HGB 9.4* 9.5* 9.6* 10.5*   * 109* 104* 111*     Basic Metabolic Panel  Recent Labs   Lab 25  0333 25  0128 25  2351 25  25  1613 25  1607 25  1202 25  1155     --   --  141  --  140  --  140   POTASSIUM 4.1  --   --  3.8  --  4.0  --  3.7   CHLORIDE 109*  --   --  109*  --  109*  --  110*   CO2 22  --   --  21*  --  20*  --  21*   BUN 8.3  --   --  7.3*  --  5.8*  --  5.4*   CR 0.68  --   --  0.70  --  0.70  --  0.66*   *  128* 135* 127* 116*   < > 99  99   < > 107*  108*    < > = values in this interval not displayed.     Liver Function Tests  Recent Labs   Lab 25  1607 25  1155   AST 67* 84* 104* 123*   ALT 18 19 21 23   ALKPHOS 82 84 88 92   BILITOTAL 0.3 0.3 0.3 0.3   ALBUMIN 3.1* 3.0* 3.1* 3.1*   INR 1.18* 1.19* 1.22* 1.24*     Pancreatic Enzymes  Recent Labs   Lab 24  0752   LIPASE 10*     Coagulation Profile  Recent Labs   Lab 25  1155   INR 1.18* 1.19* 1.22* 1.24*   * 179* 200* 156*         5. RADIOLOGY:       Recent Results (from the past 24 hours)   Echo Complete   Result Value    LVEF  20-25% (severely reduced)    [facility]    [id number]  [id number]  [id number]  [id number]^[id number]^JUAN     [facility],[place]  Echocardiography Laboratory  [address] [place] [id number]     Name: SUSAN CHENG  MRN: [id number]  : 1960  Study Date: 2025 07:41 AM  Age: 64 yrs  Gender: Male  Patient Location: [facility]  Reason For Study: Cardiac Arrest, Cardiorespiratory Failure  Ordering Physician: JUAN ASHBY  Performed By: Reji Baxter RDCS     BSA: 2.0 m2  Height: 71 in  Weight: 168 lb  HR: 81  BP: 145/83 mmHg  ______________________________________________________________________________  Procedure  Echocardiogram with two-dimensional, color and spectral Doppler. The final  echo results were communicated to Juan Browne NP. The final echo  results were communicated to the ordering physician by phone  .  ______________________________________________________________________________  Interpretation Summary  VA ECMO turndown study from 4.5L/min to 1.0L/min.     Baseline:  Severely reduced LV function, LVEF=20-25%. Normal LV size, LVIDd 4.7 cm.  Severely reduced RV function.  Septum midline.  No pericardial effusion.     With turndown to 1.0 L/min:  LV improves to 30-35%. LV size is unchnaged (LVIDd 4.7 cm.)  RV function improves slightly to moderately reduced.     This study was compared with the study from 12/31/24: Baseline images are  unchanged. Post-turndown images show improvement in LV/RV function.  ______________________________________________________________________________  Left Ventricle  Left ventricular size is normal. Left ventricular wall thickness cannot  evaluate. Left ventricular diastolic function is abnormal. Left ventricular  function is decreased. The ejection fraction is 20-25% (severely reduced).  Severe diffuse hypokinesis is present.     Right Ventricle  The right ventricle is normal size. Global right ventricular function is  severely reduced.     Atria  The atria cannot be assessed.     Mitral Valve  The mitral valve is normal. Trace mitral insufficiency is present.     Aortic Valve  The valve leaflets are not well visualized. On Doppler interrogation, there is  no significant stenosis or regurgitation.     Tricuspid Valve  The peak velocity of the tricuspid regurgitant jet is not obtainable.  Pulmonary artery systolic pressure cannot be assessed.     Pulmonic Valve  The pulmonic valve cannot be assessed.     Vessels  The aorta root cannot be assessed. Unable to assess mean RA pressure given the  patient is on a ventilator. ECMO cannula is present in the IVC.     Pericardium  No pericardial effusion is present.     Compared to Previous Study  This study was compared with the study from 12/31/24 . Baseline images are  unchanged. Post-turndown images show improvement in LV/RV  function.  ______________________________________________________________________________  MMode/2D Measurements & Calculations     IVSd: 1.0 cm  LVIDd: 4.7 cm  LVIDs: 4.2 cm  LVPWd: 0.88 cm  FS: 10.1 %  LV mass(C)d: 152.3 grams  LV mass(C)dI: 77.8 grams/m2  LVOT diam: 2.2 cm  LVOT area: 4.0 cm2  RWT: 0.38     Doppler Measurements & Calculations  PA V2 max: 140.0 cm/sec  PA max P.8 mmHg  PA mean P.0 mmHg  PA V2 VTI: 26.3 cm  PA acc time: 0.10 sec     ______________________________________________________________________________  Report approved by: Alessio Duffy MD on 2025 10:04 AM             =========================================

## 2025-01-03 ENCOUNTER — APPOINTMENT (OUTPATIENT)
Dept: CT IMAGING | Facility: CLINIC | Age: 65
DRG: 003 | End: 2025-01-03
Payer: MEDICARE

## 2025-01-03 ENCOUNTER — APPOINTMENT (OUTPATIENT)
Dept: GENERAL RADIOLOGY | Facility: CLINIC | Age: 65
DRG: 003 | End: 2025-01-03
Attending: STUDENT IN AN ORGANIZED HEALTH CARE EDUCATION/TRAINING PROGRAM
Payer: MEDICARE

## 2025-01-03 LAB
1OH-MIDAZOLAM UR QL SCN: PRESENT
ACT BLD: 109 SECONDS (ref 74–150)
ACT BLD: 129 SECONDS (ref 74–150)
ALBUMIN SERPL BCG-MCNC: 2.4 G/DL (ref 3.5–5.2)
ALLEN'S TEST: ABNORMAL
ALP SERPL-CCNC: 54 U/L (ref 40–150)
ALT SERPL W P-5'-P-CCNC: 65 U/L (ref 0–70)
ANION GAP SERPL CALCULATED.3IONS-SCNC: 13 MMOL/L (ref 7–15)
ANION GAP SERPL CALCULATED.3IONS-SCNC: 7 MMOL/L (ref 7–15)
ANION GAP SERPL CALCULATED.3IONS-SCNC: 7 MMOL/L (ref 7–15)
APTT PPP: 34 SECONDS (ref 22–38)
APTT PPP: 35 SECONDS (ref 22–38)
AST SERPL W P-5'-P-CCNC: 165 U/L (ref 0–45)
AT III ACT/NOR PPP CHRO: 49 % (ref 85–135)
ATRIAL RATE - MUSE: 67 BPM
ATRIAL RATE - MUSE: 88 BPM
BACTERIA SPT CULT: ABNORMAL
BACTERIA SPT CULT: ABNORMAL
BACTERIA SPT CULT: NORMAL
BASE EXCESS BLDA CALC-SCNC: 4 MMOL/L (ref -3–3)
BASE EXCESS BLDA CALC-SCNC: 4 MMOL/L (ref -3–3)
BASE EXCESS BLDA CALC-SCNC: 4.7 MMOL/L (ref -3–3)
BASE EXCESS BLDA CALC-SCNC: 4.8 MMOL/L (ref -3–3)
BASE EXCESS BLDA CALC-SCNC: 5.8 MMOL/L (ref -3–3)
BASE EXCESS BLDA CALC-SCNC: 5.8 MMOL/L (ref -3–3)
BASE EXCESS BLDA CALC-SCNC: 6 MMOL/L (ref -3–3)
BASE EXCESS BLDA CALC-SCNC: 6.4 MMOL/L (ref -3–3)
BASE EXCESS BLDA CALC-SCNC: 7.8 MMOL/L (ref -3–3)
BASE EXCESS BLDV CALC-SCNC: 2.5 MMOL/L (ref -3–3)
BASE EXCESS BLDV CALC-SCNC: 3 MMOL/L (ref -3–3)
BASE EXCESS BLDV CALC-SCNC: 5 MMOL/L (ref -3–3)
BASE EXCESS BLDV CALC-SCNC: 5.6 MMOL/L (ref -3–3)
BASE EXCESS BLDV CALC-SCNC: 6.7 MMOL/L (ref -3–3)
BASE EXCESS BLDV CALC-SCNC: 6.9 MMOL/L (ref -3–3)
BASE EXCESS BLDV CALC-SCNC: 7 MMOL/L (ref -3–3)
BASE EXCESS BLDV CALC-SCNC: 7.1 MMOL/L (ref -3–3)
BASOPHILS # BLD AUTO: 0 10E3/UL (ref 0–0.2)
BASOPHILS NFR BLD AUTO: 0 %
BILIRUB SERPL-MCNC: 0.5 MG/DL
BLD PROD TYP BPU: NORMAL
BLD PROD TYP BPU: NORMAL
BLOOD COMPONENT TYPE: NORMAL
BLOOD COMPONENT TYPE: NORMAL
BUN SERPL-MCNC: 15.9 MG/DL (ref 8–23)
BUN SERPL-MCNC: 15.9 MG/DL (ref 8–23)
BUN SERPL-MCNC: 26.3 MG/DL (ref 8–23)
CA-I BLD-MCNC: 4.8 MG/DL (ref 4.4–5.2)
CALCIUM SERPL-MCNC: 8.4 MG/DL (ref 8.8–10.4)
CALCIUM SERPL-MCNC: 8.5 MG/DL (ref 8.8–10.4)
CALCIUM SERPL-MCNC: 8.5 MG/DL (ref 8.8–10.4)
CHLORIDE SERPL-SCNC: 110 MMOL/L (ref 98–107)
CHLORIDE SERPL-SCNC: 112 MMOL/L (ref 98–107)
CHLORIDE SERPL-SCNC: 112 MMOL/L (ref 98–107)
CODING SYSTEM: NORMAL
CODING SYSTEM: NORMAL
CREAT SERPL-MCNC: 0.95 MG/DL (ref 0.67–1.17)
CREAT SERPL-MCNC: 0.95 MG/DL (ref 0.67–1.17)
CREAT SERPL-MCNC: 1.21 MG/DL (ref 0.67–1.17)
CROSSMATCH: NORMAL
D DIMER PPP FEU-MCNC: 0.79 UG/ML FEU (ref 0–0.5)
DIASTOLIC BLOOD PRESSURE - MUSE: NORMAL MMHG
DIASTOLIC BLOOD PRESSURE - MUSE: NORMAL MMHG
EGFRCR SERPLBLD CKD-EPI 2021: 66 ML/MIN/1.73M2
EGFRCR SERPLBLD CKD-EPI 2021: 89 ML/MIN/1.73M2
EGFRCR SERPLBLD CKD-EPI 2021: 89 ML/MIN/1.73M2
EOSINOPHIL # BLD AUTO: 0 10E3/UL (ref 0–0.7)
EOSINOPHIL NFR BLD AUTO: 0 %
ERYTHROCYTE [DISTWIDTH] IN BLOOD BY AUTOMATED COUNT: 15.7 % (ref 10–15)
ERYTHROCYTE [DISTWIDTH] IN BLOOD BY AUTOMATED COUNT: 15.7 % (ref 10–15)
ERYTHROCYTE [DISTWIDTH] IN BLOOD BY AUTOMATED COUNT: 15.9 % (ref 10–15)
FENTANYL UR CFM-MCNC: 61 NG/ML
FENTANYL/CREAT UR: 139 NG/MG {CREAT}
FIBRINOGEN PPP-MCNC: 301 MG/DL (ref 170–510)
GABAPENTIN UR QL CFM: PRESENT
GLUCOSE BLDC GLUCOMTR-MCNC: 128 MG/DL (ref 70–99)
GLUCOSE BLDC GLUCOMTR-MCNC: 133 MG/DL (ref 70–99)
GLUCOSE BLDC GLUCOMTR-MCNC: 133 MG/DL (ref 70–99)
GLUCOSE BLDC GLUCOMTR-MCNC: 135 MG/DL (ref 70–99)
GLUCOSE BLDC GLUCOMTR-MCNC: 137 MG/DL (ref 70–99)
GLUCOSE BLDC GLUCOMTR-MCNC: 143 MG/DL (ref 70–99)
GLUCOSE BLDC GLUCOMTR-MCNC: 148 MG/DL (ref 70–99)
GLUCOSE BLDC GLUCOMTR-MCNC: 155 MG/DL (ref 70–99)
GLUCOSE BLDC GLUCOMTR-MCNC: 155 MG/DL (ref 70–99)
GLUCOSE BLDC GLUCOMTR-MCNC: 164 MG/DL (ref 70–99)
GLUCOSE BLDC GLUCOMTR-MCNC: 167 MG/DL (ref 70–99)
GLUCOSE BLDC GLUCOMTR-MCNC: 190 MG/DL (ref 70–99)
GLUCOSE SERPL-MCNC: 145 MG/DL (ref 70–99)
GRAM STAIN RESULT: ABNORMAL
GRAM STAIN RESULT: ABNORMAL
GRAM STAIN RESULT: NORMAL
GRAM STAIN RESULT: NORMAL
HCO3 BLD-SCNC: 26 MMOL/L (ref 21–28)
HCO3 BLD-SCNC: 27 MMOL/L (ref 21–28)
HCO3 BLD-SCNC: 27 MMOL/L (ref 21–28)
HCO3 BLD-SCNC: 29 MMOL/L (ref 21–28)
HCO3 BLD-SCNC: 30 MMOL/L (ref 21–28)
HCO3 BLD-SCNC: 30 MMOL/L (ref 21–28)
HCO3 BLD-SCNC: 31 MMOL/L (ref 21–28)
HCO3 BLD-SCNC: 32 MMOL/L (ref 21–28)
HCO3 BLDA-SCNC: 31 MMOL/L (ref 21–28)
HCO3 BLDV-SCNC: 25 MMOL/L (ref 21–28)
HCO3 BLDV-SCNC: 27 MMOL/L (ref 21–28)
HCO3 BLDV-SCNC: 28 MMOL/L (ref 21–28)
HCO3 BLDV-SCNC: 29 MMOL/L (ref 21–28)
HCO3 BLDV-SCNC: 32 MMOL/L (ref 21–28)
HCO3 BLDV-SCNC: 33 MMOL/L (ref 21–28)
HCO3 SERPL-SCNC: 24 MMOL/L (ref 22–29)
HCO3 SERPL-SCNC: 28 MMOL/L (ref 22–29)
HCO3 SERPL-SCNC: 28 MMOL/L (ref 22–29)
HCT VFR BLD AUTO: 24 % (ref 40–53)
HCT VFR BLD AUTO: 24.8 % (ref 40–53)
HCT VFR BLD AUTO: 26.3 % (ref 40–53)
HGB BLD-MCNC: 8 G/DL (ref 13.3–17.7)
HGB BLD-MCNC: 8.5 G/DL (ref 13.3–17.7)
HGB BLD-MCNC: 9.2 G/DL (ref 13.3–17.7)
HGB FREE PLAS-MCNC: 60 MG/DL
IMM GRANULOCYTES # BLD: 0.1 10E3/UL
IMM GRANULOCYTES NFR BLD: 1 %
INR PPP: 1.29 (ref 0.85–1.15)
INTERPRETATION ECG - MUSE: NORMAL
INTERPRETATION ECG - MUSE: NORMAL
ISSUE DATE AND TIME: NORMAL
ISSUE DATE AND TIME: NORMAL
LACTATE SERPL-SCNC: 1.6 MMOL/L (ref 0.7–2)
LACTATE SERPL-SCNC: 2.3 MMOL/L (ref 0.7–2)
LACTATE SERPL-SCNC: 4 MMOL/L (ref 0.7–2)
LACTATE SERPL-SCNC: 4.6 MMOL/L (ref 0.7–2)
LYMPHOCYTES # BLD AUTO: 1.3 10E3/UL (ref 0.8–5.3)
LYMPHOCYTES NFR BLD AUTO: 9 %
MAGNESIUM SERPL-MCNC: 3.3 MG/DL (ref 1.7–2.3)
MCH RBC QN AUTO: 29.1 PG (ref 26.5–33)
MCH RBC QN AUTO: 29.3 PG (ref 26.5–33)
MCH RBC QN AUTO: 29.5 PG (ref 26.5–33)
MCHC RBC AUTO-ENTMCNC: 33.3 G/DL (ref 31.5–36.5)
MCHC RBC AUTO-ENTMCNC: 34.3 G/DL (ref 31.5–36.5)
MCHC RBC AUTO-ENTMCNC: 35 G/DL (ref 31.5–36.5)
MCV RBC AUTO: 84 FL (ref 78–100)
MCV RBC AUTO: 86 FL (ref 78–100)
MCV RBC AUTO: 87 FL (ref 78–100)
MONOCYTES # BLD AUTO: 1.9 10E3/UL (ref 0–1.3)
MONOCYTES NFR BLD AUTO: 14 %
NEUTROPHILS # BLD AUTO: 10.7 10E3/UL (ref 1.6–8.3)
NEUTROPHILS NFR BLD AUTO: 77 %
NORFENTANYL UR CFM-MCNC: 21 NG/ML
NORFENTANYL/CREAT UR: 48 NG/MG {CREAT}
NRBC # BLD AUTO: 0 10E3/UL
NRBC BLD AUTO-RTO: 0 /100
NSE SERPL IA-MCNC: 23.8 NG/ML
NSE SERPL IA-MCNC: 27.5 NG/ML
NSE SERPL IA-MCNC: 42 NG/ML
O2/TOTAL GAS SETTING VFR VENT: 30 %
O2/TOTAL GAS SETTING VFR VENT: 40 %
O2/TOTAL GAS SETTING VFR VENT: 50 %
O2/TOTAL GAS SETTING VFR VENT: 60 %
OXYCODONE UR CFM-MCNC: 1040 NG/ML
OXYCODONE/CREAT UR: 2364 NG/MG {CREAT}
OXYHGB MFR BLDA: 94 % (ref 92–100)
OXYHGB MFR BLDA: 96 % (ref 75–100)
OXYHGB MFR BLDA: 96 % (ref 92–100)
OXYHGB MFR BLDA: 96 % (ref 92–100)
OXYHGB MFR BLDA: 97 % (ref 92–100)
OXYHGB MFR BLDA: 98 % (ref 92–100)
OXYHGB MFR BLDA: 99 % (ref 92–100)
OXYHGB MFR BLDV: 41 % (ref 70–75)
OXYHGB MFR BLDV: 45 % (ref 70–75)
OXYHGB MFR BLDV: 47 % (ref 70–75)
OXYHGB MFR BLDV: 49 % (ref 70–75)
OXYHGB MFR BLDV: 50 % (ref 70–75)
OXYHGB MFR BLDV: 51 % (ref 70–75)
OXYHGB MFR BLDV: 54 % (ref 70–75)
OXYHGB MFR BLDV: 58 %
OXYMORPHONE UR CFM-MCNC: 288 NG/ML
OXYMORPHONE/CREAT UR: 655 NG/MG {CREAT}
P AXIS - MUSE: 65 DEGREES
P AXIS - MUSE: 78 DEGREES
PCO2 BLD: 29 MM HG (ref 35–45)
PCO2 BLD: 31 MM HG (ref 35–45)
PCO2 BLD: 32 MM HG (ref 35–45)
PCO2 BLD: 34 MM HG (ref 35–45)
PCO2 BLD: 41 MM HG (ref 35–45)
PCO2 BLD: 44 MM HG (ref 35–45)
PCO2 BLD: 45 MM HG (ref 35–45)
PCO2 BLD: 46 MM HG (ref 35–45)
PCO2 BLDA: 49 MM HG (ref 35–45)
PCO2 BLDV: 30 MM HG (ref 40–50)
PCO2 BLDV: 34 MM HG (ref 40–50)
PCO2 BLDV: 35 MM HG (ref 40–50)
PCO2 BLDV: 43 MM HG (ref 40–50)
PCO2 BLDV: 45 MM HG (ref 40–50)
PCO2 BLDV: 48 MM HG (ref 40–50)
PCO2 BLDV: 50 MM HG (ref 40–50)
PCO2 BLDV: 52 MM HG (ref 40–50)
PEEP: 8 CM H2O
PH BLD: 7.43 [PH] (ref 7.35–7.45)
PH BLD: 7.43 [PH] (ref 7.35–7.45)
PH BLD: 7.47 [PH] (ref 7.35–7.45)
PH BLD: 7.48 [PH] (ref 7.35–7.45)
PH BLD: 7.53 [PH] (ref 7.35–7.45)
PH BLD: 7.54 [PH] (ref 7.35–7.45)
PH BLD: 7.54 [PH] (ref 7.35–7.45)
PH BLD: 7.57 [PH] (ref 7.35–7.45)
PH BLDA: 7.41 [PH] (ref 7.35–7.45)
PH BLDV: 7.4 [PH] (ref 7.32–7.43)
PH BLDV: 7.42 [PH] (ref 7.32–7.43)
PH BLDV: 7.42 [PH] (ref 7.32–7.43)
PH BLDV: 7.44 [PH] (ref 7.32–7.43)
PH BLDV: 7.46 [PH] (ref 7.32–7.43)
PH BLDV: 7.51 [PH] (ref 7.32–7.43)
PH BLDV: 7.53 [PH] (ref 7.32–7.43)
PH BLDV: 7.53 [PH] (ref 7.32–7.43)
PHOSPHATE SERPL-MCNC: 2.9 MG/DL (ref 2.5–4.5)
PLAT MORPH BLD: NORMAL
PLATELET # BLD AUTO: 123 10E3/UL (ref 150–450)
PLATELET # BLD AUTO: 149 10E3/UL (ref 150–450)
PLATELET # BLD AUTO: 97 10E3/UL (ref 150–450)
PO2 BLD: 121 MM HG (ref 80–105)
PO2 BLD: 142 MM HG (ref 80–105)
PO2 BLD: 173 MM HG (ref 80–105)
PO2 BLD: 176 MM HG (ref 80–105)
PO2 BLD: 66 MM HG (ref 80–105)
PO2 BLD: 81 MM HG (ref 80–105)
PO2 BLD: 82 MM HG (ref 80–105)
PO2 BLD: 88 MM HG (ref 80–105)
PO2 BLDA: 87 MM HG (ref 80–105)
PO2 BLDV: 23 MM HG (ref 25–47)
PO2 BLDV: 25 MM HG (ref 25–47)
PO2 BLDV: 26 MM HG (ref 25–47)
PO2 BLDV: 26 MM HG (ref 25–47)
PO2 BLDV: 27 MM HG (ref 25–47)
PO2 BLDV: 27 MM HG (ref 25–47)
PO2 BLDV: 29 MM HG (ref 25–47)
PO2 BLDV: 31 MM HG (ref 25–47)
POTASSIUM SERPL-SCNC: 3.9 MMOL/L (ref 3.4–5.3)
POTASSIUM SERPL-SCNC: 4.4 MMOL/L (ref 3.4–5.3)
POTASSIUM SERPL-SCNC: 4.4 MMOL/L (ref 3.4–5.3)
PR INTERVAL - MUSE: 150 MS
PR INTERVAL - MUSE: 160 MS
PROT SERPL-MCNC: 4.1 G/DL (ref 6.4–8.3)
QRS DURATION - MUSE: 76 MS
QRS DURATION - MUSE: 82 MS
QT - MUSE: 440 MS
QT - MUSE: 452 MS
QTC - MUSE: 477 MS
QTC - MUSE: 532 MS
R AXIS - MUSE: -50 DEGREES
R AXIS - MUSE: -54 DEGREES
RBC # BLD AUTO: 2.75 10E6/UL (ref 4.4–5.9)
RBC # BLD AUTO: 2.9 10E6/UL (ref 4.4–5.9)
RBC # BLD AUTO: 3.12 10E6/UL (ref 4.4–5.9)
RBC MORPH BLD: NORMAL
SAO2 % BLDA: 100 % (ref 96–97)
SAO2 % BLDA: 95.7 % (ref 96–97)
SAO2 % BLDA: 97.9 % (ref 96–97)
SAO2 % BLDA: 98.1 % (ref 96–97)
SAO2 % BLDA: 98.2 % (ref 96–97)
SAO2 % BLDA: 98.7 % (ref 96–97)
SAO2 % BLDA: 99.7 % (ref 96–97)
SAO2 % BLDA: >100 % (ref 96–97)
SAO2 % BLDA: >100 % (ref 96–97)
SAO2 % BLDV: 41.9 % (ref 70–75)
SAO2 % BLDV: 46.4 % (ref 70–75)
SAO2 % BLDV: 47.7 % (ref 70–75)
SAO2 % BLDV: 50.1 % (ref 70–75)
SAO2 % BLDV: 50.8 % (ref 70–75)
SAO2 % BLDV: 51.8 % (ref 70–75)
SAO2 % BLDV: 54.9 % (ref 70–75)
SAO2 % BLDV: 58.6 % (ref 70–75)
SODIUM SERPL-SCNC: 147 MMOL/L (ref 135–145)
SYSTOLIC BLOOD PRESSURE - MUSE: NORMAL MMHG
SYSTOLIC BLOOD PRESSURE - MUSE: NORMAL MMHG
T AXIS - MUSE: 17 DEGREES
T AXIS - MUSE: 27 DEGREES
UFH PPP CHRO-ACNC: <0.1 IU/ML
UNIT ABO/RH: NORMAL
UNIT ABO/RH: NORMAL
UNIT NUMBER: NORMAL
UNIT NUMBER: NORMAL
UNIT STATUS: NORMAL
UNIT STATUS: NORMAL
UNIT TYPE ISBT: 600
UNIT TYPE ISBT: 6200
VENTRICULAR RATE- MUSE: 67 BPM
VENTRICULAR RATE- MUSE: 88 BPM
WBC # BLD AUTO: 14 10E3/UL (ref 4–11)
WBC # BLD AUTO: 20.9 10E3/UL (ref 4–11)
WBC # BLD AUTO: 24 10E3/UL (ref 4–11)

## 2025-01-03 PROCEDURE — 250N000013 HC RX MED GY IP 250 OP 250 PS 637: Performed by: STUDENT IN AN ORGANIZED HEALTH CARE EDUCATION/TRAINING PROGRAM

## 2025-01-03 PROCEDURE — 71045 X-RAY EXAM CHEST 1 VIEW: CPT | Mod: 26 | Performed by: RADIOLOGY

## 2025-01-03 PROCEDURE — 85730 THROMBOPLASTIN TIME PARTIAL: CPT | Performed by: NURSE PRACTITIONER

## 2025-01-03 PROCEDURE — 85610 PROTHROMBIN TIME: CPT | Performed by: PHYSICIAN ASSISTANT

## 2025-01-03 PROCEDURE — 250N000011 HC RX IP 250 OP 636: Performed by: PHYSICIAN ASSISTANT

## 2025-01-03 PROCEDURE — 250N000011 HC RX IP 250 OP 636: Performed by: STUDENT IN AN ORGANIZED HEALTH CARE EDUCATION/TRAINING PROGRAM

## 2025-01-03 PROCEDURE — P9037 PLATE PHERES LEUKOREDU IRRAD: HCPCS | Performed by: PHYSICIAN ASSISTANT

## 2025-01-03 PROCEDURE — 85300 ANTITHROMBIN III ACTIVITY: CPT | Performed by: PHYSICIAN ASSISTANT

## 2025-01-03 PROCEDURE — 82805 BLOOD GASES W/O2 SATURATION: CPT | Performed by: NURSE PRACTITIONER

## 2025-01-03 PROCEDURE — 83051 HEMOGLOBIN PLASMA: CPT | Performed by: PHYSICIAN ASSISTANT

## 2025-01-03 PROCEDURE — 250N000011 HC RX IP 250 OP 636: Performed by: NURSE PRACTITIONER

## 2025-01-03 PROCEDURE — 99291 CRITICAL CARE FIRST HOUR: CPT | Mod: FS | Performed by: ANESTHESIOLOGY

## 2025-01-03 PROCEDURE — 85014 HEMATOCRIT: CPT | Performed by: STUDENT IN AN ORGANIZED HEALTH CARE EDUCATION/TRAINING PROGRAM

## 2025-01-03 PROCEDURE — 83605 ASSAY OF LACTIC ACID: CPT

## 2025-01-03 PROCEDURE — 250N000013 HC RX MED GY IP 250 OP 250 PS 637: Performed by: PHYSICIAN ASSISTANT

## 2025-01-03 PROCEDURE — 85730 THROMBOPLASTIN TIME PARTIAL: CPT | Performed by: PHYSICIAN ASSISTANT

## 2025-01-03 PROCEDURE — 82805 BLOOD GASES W/O2 SATURATION: CPT | Performed by: PHYSICIAN ASSISTANT

## 2025-01-03 PROCEDURE — 250N000011 HC RX IP 250 OP 636: Performed by: SURGERY

## 2025-01-03 PROCEDURE — 250N000013 HC RX MED GY IP 250 OP 250 PS 637: Performed by: NURSE PRACTITIONER

## 2025-01-03 PROCEDURE — 87040 BLOOD CULTURE FOR BACTERIA: CPT

## 2025-01-03 PROCEDURE — 360N000079 HC SURGERY LEVEL 6, PER MIN: Performed by: STUDENT IN AN ORGANIZED HEALTH CARE EDUCATION/TRAINING PROGRAM

## 2025-01-03 PROCEDURE — 272N000001 HC OR GENERAL SUPPLY STERILE: Performed by: STUDENT IN AN ORGANIZED HEALTH CARE EDUCATION/TRAINING PROGRAM

## 2025-01-03 PROCEDURE — 85347 COAGULATION TIME ACTIVATED: CPT

## 2025-01-03 PROCEDURE — 83735 ASSAY OF MAGNESIUM: CPT | Performed by: PHYSICIAN ASSISTANT

## 2025-01-03 PROCEDURE — 258N000003 HC RX IP 258 OP 636

## 2025-01-03 PROCEDURE — 85025 COMPLETE CBC W/AUTO DIFF WBC: CPT | Performed by: PHYSICIAN ASSISTANT

## 2025-01-03 PROCEDURE — 33949 ECMO/ECLS DAILY MGMT ARTERY: CPT

## 2025-01-03 PROCEDURE — 999N000254 HC STATISTIC VENTILATOR TRANSFER

## 2025-01-03 PROCEDURE — 33984 ECMO/ECLS RMVL PRPH CANNULA: CPT | Mod: 79 | Performed by: STUDENT IN AN ORGANIZED HEALTH CARE EDUCATION/TRAINING PROGRAM

## 2025-01-03 PROCEDURE — 82805 BLOOD GASES W/O2 SATURATION: CPT | Performed by: STUDENT IN AN ORGANIZED HEALTH CARE EDUCATION/TRAINING PROGRAM

## 2025-01-03 PROCEDURE — 84100 ASSAY OF PHOSPHORUS: CPT | Performed by: PHYSICIAN ASSISTANT

## 2025-01-03 PROCEDURE — 85014 HEMATOCRIT: CPT | Performed by: NURSE PRACTITIONER

## 2025-01-03 PROCEDURE — 04QK0ZZ REPAIR RIGHT FEMORAL ARTERY, OPEN APPROACH: ICD-10-PCS | Performed by: STUDENT IN AN ORGANIZED HEALTH CARE EDUCATION/TRAINING PROGRAM

## 2025-01-03 PROCEDURE — 82805 BLOOD GASES W/O2 SATURATION: CPT

## 2025-01-03 PROCEDURE — 250N000013 HC RX MED GY IP 250 OP 250 PS 637

## 2025-01-03 PROCEDURE — 82330 ASSAY OF CALCIUM: CPT | Performed by: PHYSICIAN ASSISTANT

## 2025-01-03 PROCEDURE — 85520 HEPARIN ASSAY: CPT | Performed by: PHYSICIAN ASSISTANT

## 2025-01-03 PROCEDURE — 80053 COMPREHEN METABOLIC PANEL: CPT | Performed by: PHYSICIAN ASSISTANT

## 2025-01-03 PROCEDURE — 36415 COLL VENOUS BLD VENIPUNCTURE: CPT

## 2025-01-03 PROCEDURE — 80048 BASIC METABOLIC PNL TOTAL CA: CPT | Performed by: NURSE PRACTITIONER

## 2025-01-03 PROCEDURE — 70450 CT HEAD/BRAIN W/O DYE: CPT

## 2025-01-03 PROCEDURE — 94003 VENT MGMT INPAT SUBQ DAY: CPT

## 2025-01-03 PROCEDURE — 250N000011 HC RX IP 250 OP 636

## 2025-01-03 PROCEDURE — 70450 CT HEAD/BRAIN W/O DYE: CPT | Mod: 26 | Performed by: RADIOLOGY

## 2025-01-03 PROCEDURE — 71045 X-RAY EXAM CHEST 1 VIEW: CPT

## 2025-01-03 PROCEDURE — 85041 AUTOMATED RBC COUNT: CPT | Performed by: NURSE PRACTITIONER

## 2025-01-03 PROCEDURE — 250N000009 HC RX 250: Performed by: STUDENT IN AN ORGANIZED HEALTH CARE EDUCATION/TRAINING PROGRAM

## 2025-01-03 PROCEDURE — 85379 FIBRIN DEGRADATION QUANT: CPT | Performed by: PHYSICIAN ASSISTANT

## 2025-01-03 PROCEDURE — 85384 FIBRINOGEN ACTIVITY: CPT | Performed by: PHYSICIAN ASSISTANT

## 2025-01-03 PROCEDURE — 250N000009 HC RX 250

## 2025-01-03 PROCEDURE — 83605 ASSAY OF LACTIC ACID: CPT | Performed by: PHYSICIAN ASSISTANT

## 2025-01-03 PROCEDURE — 06QM0ZZ REPAIR RIGHT FEMORAL VEIN, OPEN APPROACH: ICD-10-PCS | Performed by: STUDENT IN AN ORGANIZED HEALTH CARE EDUCATION/TRAINING PROGRAM

## 2025-01-03 PROCEDURE — 999N000157 HC STATISTIC RCP TIME EA 10 MIN

## 2025-01-03 PROCEDURE — 200N000002 HC R&B ICU UMMC

## 2025-01-03 RX ORDER — VANCOMYCIN HYDROCHLORIDE
1500 EVERY 24 HOURS
Status: DISCONTINUED | OUTPATIENT
Start: 2025-01-05 | End: 2025-01-05

## 2025-01-03 RX ORDER — FENTANYL CITRATE 50 UG/ML
25 INJECTION, SOLUTION INTRAMUSCULAR; INTRAVENOUS EVERY 5 MIN PRN
Status: DISCONTINUED | OUTPATIENT
Start: 2025-01-03 | End: 2025-01-03

## 2025-01-03 RX ORDER — PROTAMINE SULFATE 10 MG/ML
75 INJECTION, SOLUTION INTRAVENOUS ONCE
Status: COMPLETED | OUTPATIENT
Start: 2025-01-03 | End: 2025-01-03

## 2025-01-03 RX ORDER — DEXAMETHASONE SODIUM PHOSPHATE 4 MG/ML
4 INJECTION, SOLUTION INTRA-ARTICULAR; INTRALESIONAL; INTRAMUSCULAR; INTRAVENOUS; SOFT TISSUE
Status: DISCONTINUED | OUTPATIENT
Start: 2025-01-03 | End: 2025-01-03

## 2025-01-03 RX ORDER — OXYCODONE HYDROCHLORIDE 5 MG/1
5 TABLET ORAL
Status: DISCONTINUED | OUTPATIENT
Start: 2025-01-03 | End: 2025-01-03

## 2025-01-03 RX ORDER — DOBUTAMINE HYDROCHLORIDE 200 MG/100ML
2.5 INJECTION INTRAVENOUS CONTINUOUS
Status: DISCONTINUED | OUTPATIENT
Start: 2025-01-04 | End: 2025-01-04

## 2025-01-03 RX ORDER — DEXTROSE MONOHYDRATE 100 MG/ML
INJECTION, SOLUTION INTRAVENOUS CONTINUOUS PRN
Status: DISCONTINUED | OUTPATIENT
Start: 2025-01-03 | End: 2025-01-25 | Stop reason: HOSPADM

## 2025-01-03 RX ORDER — FENTANYL CITRATE 50 UG/ML
50 INJECTION, SOLUTION INTRAMUSCULAR; INTRAVENOUS ONCE
Status: COMPLETED | OUTPATIENT
Start: 2025-01-03 | End: 2025-01-03

## 2025-01-03 RX ORDER — NALOXONE HYDROCHLORIDE 0.4 MG/ML
0.1 INJECTION, SOLUTION INTRAMUSCULAR; INTRAVENOUS; SUBCUTANEOUS
Status: DISCONTINUED | OUTPATIENT
Start: 2025-01-03 | End: 2025-01-03

## 2025-01-03 RX ORDER — NALOXONE HYDROCHLORIDE 0.4 MG/ML
0.2 INJECTION, SOLUTION INTRAMUSCULAR; INTRAVENOUS; SUBCUTANEOUS
Status: DISCONTINUED | OUTPATIENT
Start: 2025-01-03 | End: 2025-01-03

## 2025-01-03 RX ORDER — LIDOCAINE 40 MG/G
CREAM TOPICAL
Status: DISCONTINUED | OUTPATIENT
Start: 2025-01-03 | End: 2025-01-03

## 2025-01-03 RX ORDER — ONDANSETRON 4 MG/1
4 TABLET, ORALLY DISINTEGRATING ORAL EVERY 30 MIN PRN
Status: DISCONTINUED | OUTPATIENT
Start: 2025-01-03 | End: 2025-01-03

## 2025-01-03 RX ORDER — ASPIRIN 81 MG/1
162 TABLET, CHEWABLE ORAL DAILY
Status: DISCONTINUED | OUTPATIENT
Start: 2025-01-04 | End: 2025-01-14

## 2025-01-03 RX ORDER — HYDROMORPHONE HCL IN WATER/PF 6 MG/30 ML
0.4 PATIENT CONTROLLED ANALGESIA SYRINGE INTRAVENOUS EVERY 5 MIN PRN
Status: DISCONTINUED | OUTPATIENT
Start: 2025-01-03 | End: 2025-01-03

## 2025-01-03 RX ORDER — HEPARIN SODIUM 5000 [USP'U]/.5ML
5000 INJECTION, SOLUTION INTRAVENOUS; SUBCUTANEOUS EVERY 8 HOURS
Status: DISCONTINUED | OUTPATIENT
Start: 2025-01-03 | End: 2025-01-14

## 2025-01-03 RX ORDER — ONDANSETRON 2 MG/ML
4 INJECTION INTRAMUSCULAR; INTRAVENOUS EVERY 30 MIN PRN
Status: DISCONTINUED | OUTPATIENT
Start: 2025-01-03 | End: 2025-01-03

## 2025-01-03 RX ORDER — SERTRALINE HYDROCHLORIDE 100 MG/1
200 TABLET, FILM COATED ORAL DAILY
Status: DISCONTINUED | OUTPATIENT
Start: 2025-01-03 | End: 2025-01-25 | Stop reason: HOSPADM

## 2025-01-03 RX ORDER — BACLOFEN 10 MG/1
10 TABLET ORAL EVERY 8 HOURS
Status: DISCONTINUED | OUTPATIENT
Start: 2025-01-03 | End: 2025-01-25 | Stop reason: HOSPADM

## 2025-01-03 RX ORDER — HEPARIN SODIUM 1000 [USP'U]/ML
5000 INJECTION, SOLUTION INTRAVENOUS; SUBCUTANEOUS ONCE
Status: COMPLETED | OUTPATIENT
Start: 2025-01-03 | End: 2025-01-03

## 2025-01-03 RX ORDER — GUAIFENESIN 600 MG/1
15 TABLET, EXTENDED RELEASE ORAL DAILY
Status: DISCONTINUED | OUTPATIENT
Start: 2025-01-03 | End: 2025-01-10

## 2025-01-03 RX ORDER — FENTANYL CITRATE 50 UG/ML
50 INJECTION, SOLUTION INTRAMUSCULAR; INTRAVENOUS EVERY 5 MIN PRN
Status: DISCONTINUED | OUTPATIENT
Start: 2025-01-03 | End: 2025-01-03

## 2025-01-03 RX ORDER — VANCOMYCIN 2 GRAM/500 ML IN 0.9 % SODIUM CHLORIDE INTRAVENOUS
2000 ONCE
Status: COMPLETED | OUTPATIENT
Start: 2025-01-04 | End: 2025-01-04

## 2025-01-03 RX ORDER — AMINO ACIDS/PROTEIN HYDROLYS 11G-40/45
1 LIQUID IN PACKET (ML) ORAL DAILY
Status: DISCONTINUED | OUTPATIENT
Start: 2025-01-03 | End: 2025-01-10

## 2025-01-03 RX ORDER — FENTANYL CITRATE 50 UG/ML
INJECTION, SOLUTION INTRAMUSCULAR; INTRAVENOUS
Status: DISCONTINUED
Start: 2025-01-03 | End: 2025-01-04 | Stop reason: HOSPADM

## 2025-01-03 RX ORDER — SODIUM CHLORIDE, SODIUM LACTATE, POTASSIUM CHLORIDE, CALCIUM CHLORIDE 600; 310; 30; 20 MG/100ML; MG/100ML; MG/100ML; MG/100ML
INJECTION, SOLUTION INTRAVENOUS CONTINUOUS
Status: DISCONTINUED | OUTPATIENT
Start: 2025-01-03 | End: 2025-01-03

## 2025-01-03 RX ORDER — HYDROMORPHONE HCL IN WATER/PF 6 MG/30 ML
0.2 PATIENT CONTROLLED ANALGESIA SYRINGE INTRAVENOUS EVERY 5 MIN PRN
Status: DISCONTINUED | OUTPATIENT
Start: 2025-01-03 | End: 2025-01-03

## 2025-01-03 RX ORDER — NALOXONE HYDROCHLORIDE 0.4 MG/ML
0.4 INJECTION, SOLUTION INTRAMUSCULAR; INTRAVENOUS; SUBCUTANEOUS
Status: DISCONTINUED | OUTPATIENT
Start: 2025-01-03 | End: 2025-01-03

## 2025-01-03 RX ORDER — NOREPINEPHRINE BITARTRATE 0.06 MG/ML
.01-.6 INJECTION, SOLUTION INTRAVENOUS CONTINUOUS
Status: DISCONTINUED | OUTPATIENT
Start: 2025-01-03 | End: 2025-01-04

## 2025-01-03 RX ORDER — OXYCODONE HYDROCHLORIDE 10 MG/1
10 TABLET ORAL
Status: DISCONTINUED | OUTPATIENT
Start: 2025-01-03 | End: 2025-01-03

## 2025-01-03 RX ORDER — CEFTRIAXONE 2 G/1
2 INJECTION, POWDER, FOR SOLUTION INTRAMUSCULAR; INTRAVENOUS EVERY 24 HOURS
Status: DISCONTINUED | OUTPATIENT
Start: 2025-01-03 | End: 2025-01-03

## 2025-01-03 RX ORDER — ROSUVASTATIN CALCIUM 20 MG/1
40 TABLET, COATED ORAL DAILY
Status: DISCONTINUED | OUTPATIENT
Start: 2025-01-03 | End: 2025-01-25 | Stop reason: HOSPADM

## 2025-01-03 RX ADMIN — FENTANYL CITRATE 50 MCG: 50 INJECTION, SOLUTION INTRAMUSCULAR; INTRAVENOUS at 22:10

## 2025-01-03 RX ADMIN — HYDRALAZINE HYDROCHLORIDE 10 MG: 20 INJECTION INTRAMUSCULAR; INTRAVENOUS at 20:31

## 2025-01-03 RX ADMIN — PROPOFOL 40 MCG/KG/MIN: 10 INJECTION, EMULSION INTRAVENOUS at 05:31

## 2025-01-03 RX ADMIN — Medication 15 ML: at 11:43

## 2025-01-03 RX ADMIN — BACLOFEN 10 MG: 10 TABLET ORAL at 17:23

## 2025-01-03 RX ADMIN — ROSUVASTATIN CALCIUM 40 MG: 20 TABLET, FILM COATED ORAL at 15:03

## 2025-01-03 RX ADMIN — INSULIN HUMAN 3 UNITS/HR: 1 INJECTION, SOLUTION INTRAVENOUS at 14:50

## 2025-01-03 RX ADMIN — CEFTRIAXONE SODIUM 2 G: 2 INJECTION, POWDER, FOR SOLUTION INTRAMUSCULAR; INTRAVENOUS at 18:13

## 2025-01-03 RX ADMIN — HEPARIN SODIUM 5000 UNITS: 1000 INJECTION INTRAVENOUS; SUBCUTANEOUS at 07:35

## 2025-01-03 RX ADMIN — SENNOSIDES AND DOCUSATE SODIUM 1 TABLET: 50; 8.6 TABLET ORAL at 09:47

## 2025-01-03 RX ADMIN — Medication 0.5 UNITS/HR: at 02:37

## 2025-01-03 RX ADMIN — HEPARIN SODIUM 5000 UNITS: 5000 INJECTION, SOLUTION INTRAVENOUS; SUBCUTANEOUS at 15:03

## 2025-01-03 RX ADMIN — POTASSIUM & SODIUM PHOSPHATES POWDER PACK 280-160-250 MG 1 PACKET: 280-160-250 PACK at 03:43

## 2025-01-03 RX ADMIN — BACLOFEN 10 MG: 10 TABLET ORAL at 23:44

## 2025-01-03 RX ADMIN — PROPOFOL 40 MCG/KG/MIN: 10 INJECTION, EMULSION INTRAVENOUS at 01:08

## 2025-01-03 RX ADMIN — Medication 60 ML: at 11:43

## 2025-01-03 RX ADMIN — SENNOSIDES AND DOCUSATE SODIUM 1 TABLET: 50; 8.6 TABLET ORAL at 21:39

## 2025-01-03 RX ADMIN — SODIUM CHLORIDE, POTASSIUM CHLORIDE, SODIUM LACTATE AND CALCIUM CHLORIDE 500 ML: 600; 310; 30; 20 INJECTION, SOLUTION INTRAVENOUS at 01:29

## 2025-01-03 RX ADMIN — CHLORHEXIDINE GLUCONATE 15 ML: 1.2 SOLUTION ORAL at 09:46

## 2025-01-03 RX ADMIN — SODIUM CHLORIDE, POTASSIUM CHLORIDE, SODIUM LACTATE AND CALCIUM CHLORIDE 1000 ML: 600; 310; 30; 20 INJECTION, SOLUTION INTRAVENOUS at 23:43

## 2025-01-03 RX ADMIN — Medication 150 MCG/HR: at 04:17

## 2025-01-03 RX ADMIN — POTASSIUM & SODIUM PHOSPHATES POWDER PACK 280-160-250 MG 1 PACKET: 280-160-250 PACK at 09:12

## 2025-01-03 RX ADMIN — HEPARIN SODIUM 5000 UNITS: 5000 INJECTION, SOLUTION INTRAVENOUS; SUBCUTANEOUS at 23:44

## 2025-01-03 RX ADMIN — HYDRALAZINE HYDROCHLORIDE 10 MG: 20 INJECTION INTRAMUSCULAR; INTRAVENOUS at 12:12

## 2025-01-03 RX ADMIN — PROTAMINE SULFATE 50 MG: 10 INJECTION, SOLUTION INTRAVENOUS at 07:48

## 2025-01-03 RX ADMIN — CHLORHEXIDINE GLUCONATE 15 ML: 1.2 SOLUTION ORAL at 21:39

## 2025-01-03 RX ADMIN — ASPIRIN 81 MG CHEWABLE TABLET 81 MG: 81 TABLET CHEWABLE at 09:44

## 2025-01-03 RX ADMIN — SERTRALINE HYDROCHLORIDE 200 MG: 100 TABLET ORAL at 11:43

## 2025-01-03 RX ADMIN — SODIUM CHLORIDE, POTASSIUM CHLORIDE, SODIUM LACTATE AND CALCIUM CHLORIDE 500 ML: 600; 310; 30; 20 INJECTION, SOLUTION INTRAVENOUS at 19:43

## 2025-01-03 RX ADMIN — HYDRALAZINE HYDROCHLORIDE 10 MG: 20 INJECTION INTRAMUSCULAR; INTRAVENOUS at 12:07

## 2025-01-03 RX ADMIN — POLYETHYLENE GLYCOL 3350 17 G: 17 POWDER, FOR SOLUTION ORAL at 09:44

## 2025-01-03 RX ADMIN — PANTOPRAZOLE SODIUM 40 MG: 40 INJECTION, POWDER, FOR SOLUTION INTRAVENOUS at 09:46

## 2025-01-03 ASSESSMENT — ACTIVITIES OF DAILY LIVING (ADL)
ADLS_ACUITY_SCORE: 43
ADLS_ACUITY_SCORE: 43
ADLS_ACUITY_SCORE: 51
ADLS_ACUITY_SCORE: 43
ADLS_ACUITY_SCORE: 51
ADLS_ACUITY_SCORE: 47
ADLS_ACUITY_SCORE: 51
ADLS_ACUITY_SCORE: 43
ADLS_ACUITY_SCORE: 43
ADLS_ACUITY_SCORE: 51
ADLS_ACUITY_SCORE: 47
ADLS_ACUITY_SCORE: 43
ADLS_ACUITY_SCORE: 47
ADLS_ACUITY_SCORE: 43
ADLS_ACUITY_SCORE: 51
ADLS_ACUITY_SCORE: 43
ADLS_ACUITY_SCORE: 51
ADLS_ACUITY_SCORE: 43

## 2025-01-03 NOTE — PROGRESS NOTES
ADDENDUM    Messaged primary team and ok to feed stomach at rate of 20 mL/hr. Team expressed hope that he extubates soon.    Updated order to max rate of 20 mL/hr, continuing protein pkts.  Pivot 1.5 @ 20 mL/hr + 1 pkt ProSource daily  Initiate @ 10 ml/hr and if tolerated in 8 hours, advance to 20 mL/hr (max rate with gastric feeds)    Liz Garcia RD, LD  Available on Vocera - can search by name or unit Dietitian  **Clinical Nutrition is no longer available via pager  ------------------------------------------------    CLINICAL NUTRITION SERVICES - BRIEF NOTE      Reason for RD note: Provider order for:    Nutrition Services Adult IP Consult  Start:  01/03/25 1012,   End:  01/03/25 1012,   ONE TIME,   Standing Count:  1 Occurrences,   Routine        Comments: Ok to start trickle TF. Advance slowly.   Order Class: Hospital Performed   References:    Medical Nutrition Therapy policy and MNT protocol   Provider: (Not yet assigned)   Question: Reason for Consult: Answer: Registered Dietitian to order TF per Medical Nutrition Therapy Guidelines        New Findings/Chart Review:  Enteral Access: OGT (AXR)    VA ECMO decannulation this AM    Interventions:  EN support via OGT:  Goal TF: Pivot 1.5 Lalito (or equivalent) @ goal of  55ml/hr  (1320ml/day) + 1 Prosource TF20 provides: 2060 kcals (26 kcal/kg), 144 g PRO (1.8 g pro/kg), 990 ml free H20, 227 g CHO, and 9 g fiber daily.   Initiate @ 10 ml/hr and advance by 10 ml Q8H pending pt's tolerance.  Do not advance TF rate unless Mg++ > 1.5, K+ is > 3, and phos > 1.9  30 ml Q4H fluid flushes for tube patency. Additional fluids and/or adjustments per MD.    Multivitamin/minerals to help ensure micronutrient needs being met with suspected hypermetabolic demands and potential interruptions to TF infusions.   Gastric enteral access: HOB > 30 degrees     Future/Additional Recommendations:  Monitor tolerance and lytes with advancement to goal TF via OGT. Monitor need for small  bore FT by 1/7 (end of OGT lifespan).    Nutrition will continue to follow per protocol.    Liz Garcia RD, LD  Available on Vocera - can search by name or unit Dietitian  **Clinical Nutrition is no longer available via pager

## 2025-01-03 NOTE — PROGRESS NOTES
[facility]    ECLS Shift Summary:     ECMO Equipment:  Console Serial Number: [id number]  Circuit Lot Number: [id number]  Oxygenator Lot Number: [id number]         Arterial ECMO Cannula: 17 Fr in the Right Femoral Artery  Venous ECMO Cannula: 25 Fr in the Right Femoral Vein  Distal Perfusion Catheter: 8 Fr in the Right Superficial Femoral Artery            Patient remains on V-A ECMO, all equipment is functioning and alarms are appropriately set. RPM's: 3470 with Blood Flow (Circuit) LPM  Avg: 3.8 LPM  Min: 3.58 LPM  Max: 4.05 LPM L/min. Sweep is at (S) 2 LPM and 60 %. Extremities are pale.     Significant Shift Events: Patient went to OR this morning for a CABGx4. Back to unit this evening.    Vent settings:  FiO2 (%): 40 %, Resp: 14, Vent Mode: CMV/AC, Resp Rate (Set): 14 breaths/min, Tidal Volume (Set, mL): 430 mL, PEEP (cm H2O): 8 cmH2O, Resp Rate (Set): 14 breaths/min, Tidal Volume (Set, mL): 430 mL, PEEP (cm H2O): 8 cmH2O    Anticoagulation:  Dose (units/hr) HEParin: 0 Units/hr  Rate (mL/hr) HEParin: 0 mL/hr  Concentration HEParin: 100 Units/mL        Most recent: ACT  (seconds): 178 seconds    Urine output is slowed.  .  Blood loss was 1 L in OR. Product given included see anesthesia report.     Intake/Output Summary (Last 24 hours) at 1/2/2025 1824  Last data filed at 1/2/2025 1715  Gross per 24 hour   Intake 6086.73 ml   Output 2126 ml   Net 3960.73 ml       Labs:  Recent Labs   Lab 01/02/25  1722 01/02/25  1653 01/02/25  1620 01/02/25  1528   PH 7.31* 7.24* 7.28* 7.28*   PCO2 44 49* 29* 35   PO2 108* 81 98 246*   HCO3 22 21 13* 16*   O2PER 60  60  60 40.0 50.0 80.0       Lab Results   Component Value Date    HGB 9.8 (L) 01/02/2025    PHGB 40 (H) 01/02/2025    PLT 90 (L) 01/02/2025    FIBR 229 01/02/2025    INR 1.59 (H) 01/02/2025    PTT 71 (H) 01/02/2025    DD 1.48 (H) 01/02/2025    ANTCH 64 (L) 01/02/2025       Plan is to wean flows as able and hopefully decan  tomorrow.    Jacklyn Urena RT  ECMO Specialist  1/2/2025 6:24 PM

## 2025-01-03 NOTE — PLAN OF CARE
Major Shift Events:  Patient decannulated at bedside without complications.  Pressors and sedation turned off.  Left groin arterial sheath and CVC removed at 1230; clean,dry,soft with good pulses.  Bedrest until 1830.  Hydralazine given x1 for sbp >140.  Levo restarted shortly thereafter.    Plan: Initiate TF's, pressure support when able.   For vital signs and complete assessments, please see documentation flowsheets.

## 2025-01-03 NOTE — PROGRESS NOTES
[facility]    ECLS Discontinuation Note:     ECLS was discontinued 1/3/2025 at 0744.    Betzaida Fonseca RT  ECMO Specialist  1/3/2025 9:48 AM

## 2025-01-03 NOTE — PROGRESS NOTES
[facility]    ICU Progress Note       Date of Admission:  12/31/2024    Assessment: Critical Care   Erwin Aguilar is a 64 year old male with a past medical history of DM2, HTN, PVD, and neuropathic pain    12/31/24-  presented to the ED with arm pain, vomiting, and diarrhea; ACS and ST depression. Subsequently had VT arrest with 1 round of CPR, epinephrine, defibrillation. After ROSC had afib with RVR. Became hypotensive and was subsequently cannulated for VA ECMO.   1/2/25 CABG x4 and returned with ongoing VA ECMO.  1/3/25 Decannulated from ECMO at bedside    Today:  - amiodarone infusion stopped  - crestor 40mg daily resumed  - ECMO decan  - weaned off all pressors and sedatives  - start SBT    Plan: Critical Care   PLAN:  Neuro/ pain/ sedation:  # Acute postoperative pain  # Chronic pain with opioid dependence  # Neuropathy  # Sedation while on mechanical ventilation   - Fentanyl, propofol, and midazolam stopped am 1/3     # Generalized Anxiety  - Continue PTA Sertraline  - Hold PTA Hydroxyzine     # Encephalopathy  - Prior to procedure  - 1/1 vEEG without seizure activity (severe encephalopathy) and 12/31 CTH negative for acute findings.     Pulmonary care:   # Acute Hypoxic Respiratory failure   # Post-operative mechanical ventilation   FiO2 (%): 30 %, Resp: 20, Vent Mode: CMV/AC, Resp Rate (Set): 14 breaths/min, Tidal Volume (Set, mL): 480 mL, PEEP (cm H2O): 8 cmH2O, Resp Rate (Set): 14 breaths/min, Tidal Volume (Set, mL): 480 mL, PEEP (cm H2O): 8 cmH2O   - Goal SpO2 > 92%  - CXR on arrival, then daily   - Monitor CT output   - SBT BID     Cardiovascular:    # Shock, cardiogenic, vasoplegic  # HTN  # VT Cardiac Arrest on VA ECMO (01/01/24 - 1/3)  # CAD s/p CABG x4  # Dyslipidemia  # Cardiomyopathy   1/2/25 Echo 30-35%, mild MR, RV   - Monitor hemodynamics closely. Goal MAP >65, SBP <140   - NE gtts for inotropic and pressor support, wean as able  - Aspirin 81mg  daily  - Crestor 40mg daily  - Amiodarone gtt stopped 1/3   - A/V wires  - Hold PTA apixaban, Dyazide, propanolol      GI care / Nutrition:   # At risk for protein calorie malnutrition   - Diet: NPO  - TF started trickle rate via OG 1/3  - PPI  - Last BM: PTA. Bowel regimen: MiraLAX, senna     Renal / Fluids / Electrolytes:   # Acute Kidney Injury 2/2 ATN  BL creat appears to be ~ 0.6-0.8  - Strict I/O, daily weights, avoid/limit nephrotoxins  - Replete lytes PRN per protocol     # Lactic Acidosis, resolved     Endocrine:    # Stress hyperglycemia  # Type 2 Diabetes Mellitus   - Preop A1c 5.7  - Insulin gtt  - Goal BG <180 for optimal healing  - Hold PTA Lantus     ID / Antibiotics:  # Stress induced leukocytosis  # Aspiration Pneumonia, Klebsiella  - Ceftriaxone for 7 days  - Monitor fever curve, WBC, and inflammatory markers as appropriate     Cultures  - 12/31 sputum: Klebsiella pneumonia  - 12/31 MRSA and blood cultures negative     Heme:     # Acute blood loss anemia  # Acute thrombocytopenia  No s/sx active bleeding  - Trending CBC   - Hgb goal > 7.0, transfuse PRN      MSK / Skin:  # Sternotomy   # Surgical Incision  - Sternal precautions, postop incision management protocol  - PT/OT/CR     #PVD  - s/p right BKA, 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     60 minutes of CC times spent in the management of this patient excluding procedures same day.         VANCE [facility]  Securely message with [employer] (more info)  Text page via [employer] Paging/Directory     Clinically Significant Risk Factors        # Hypokalemia: Lowest K = 2.7 mmol/L in  "last 2 days, will replace as needed  # Hypernatremia: Highest Na = 150 mmol/L in last 2 days, will monitor as appropriate  # Hyperchloremia: Highest Cl = 112 mmol/L in last 2 days, will monitor as appropriate      # Hypocalcemia: Lowest iCa = 3.8 mg/dL in last 2 days, will monitor and replace as appropriate  # Hypercalcemia: Highest iCa = 5.3 mg/dL in last 2 days, will monitor as appropriate   # Anion Gap Metabolic Acidosis: Highest Anion Gap = 19 mmol/L in last 2 days, will monitor and treat as appropriate  # Hypoalbuminemia: Lowest albumin = 2 g/dL at 1/2/2025  5:22 PM, will monitor as appropriate  # Coagulation Defect: INR = 1.29 (Ref range: 0.85 - 1.15) and/or PTT = 35 Seconds (Ref range: 22 - 38 Seconds), will monitor for bleeding  # Thrombocytopenia: Lowest platelets = 84 in last 2 days, will monitor for bleeding   # Hypertension: Noted on problem list  # Chronic heart failure with reduced ejection fraction: last echo with EF <40%    # Acute Hypoxic Respiratory Failure: Documented O2 saturation < 90%. Continue supplemental oxygen as needed  # Acute Hypercapnic Respiratory Failure: based on arterial blood gas results.  Continue supplemental oxygen and ventilatory support as indicated.  # Acute Hypercapnic Respiratory Failure: based on venous blood gas results.  Continue supplemental oxygen and ventilatory support as indicated.         # Overweight: Estimated body mass index is 26.32 kg/m  as calculated from the following:    Height as of this encounter: 1.803 m (5' 11\").    Weight as of this encounter: 85.6 kg (188 lb 11.4 oz)., PRESENT ON ADMISSION     # Asthma: noted on problem list  # History of CABG: noted on surgical history       ____________________________________________________________________    Interval History   Bedside decannulation underway 0730.    Physical Exam   Vital Signs: Temp: 99.5  F (37.5  C) Temp src: Bladder   Pulse: 80   Resp: 16 SpO2: 98 % O2 Device: Mechanical Ventilator    Weight: " 188 lbs 11.42 oz  GEN: Critically ill  EYES: PERRL, Anicteric sclera.   HEENT:  Normocephalic, atraumatic, trachea midline, ETT secure  CV: RRR  PULM/CHEST: Clear breath sounds bilaterally, diminished breath sounds bilateral lower  GI: normal bowel sounds, soft  : ca catheter in place, urine yellow and clear  EXTREMITIES: +2 peripheral edema, peripheral pulses intact except RLE BKA  NEURO: unable to assess due to RASS -5  SKIN: sternotomy  PSYCH:  Affect: RIKI due to sedation  Imaging personally reviewed:  ECG     Data   I reviewed all medications, new labs and imaging results over the last 24 hours.  Arterial Blood Gases   Recent Labs   Lab 01/03/25  0549 01/03/25  0417 01/03/25  0336 01/03/25  0222   PH 7.54* 7.43 7.47* 7.48*   PCO2 34* 46* 41 44   PO2 142* 176* 88 121*   HCO3 29* 31* 30* 32*       Complete Blood Count   Recent Labs   Lab 01/03/25  0907 01/03/25  0336 01/02/25 2155 01/02/25 2024   WBC 20.9* 14.0* 20.2* 21.4*   HGB 8.0* 9.2* 10.3* 10.9*   * 97* 84* 90*       Basic Metabolic Panel  Recent Labs   Lab 01/03/25  0906 01/03/25  0549 01/03/25  0340 01/03/25  0336 01/03/25  0001 01/02/25 2155 01/02/25 2024 01/02/25  1940 01/02/25  1722   NA  --   --   --  147*  147*  --  148* 147*  --  149*   POTASSIUM  --   --   --  4.4  4.4  --  4.1 4.3  --  4.6   CHLORIDE  --   --   --  112*  112*  --  112* 111*  --  110*   CO2  --   --   --  28  28  --  25 24  --  20*   BUN  --   --   --  15.9  15.9  --  14.2 13.7  --  11.7   CR  --   --   --  0.95  0.95  --  1.00 0.96  --  1.02   * 133* 137* 145*  145*   < > 186*  203* 204*   < > 150*  164*    < > = values in this interval not displayed.       Liver Function Tests  Recent Labs   Lab 01/03/25  0336 01/02/25 2155 01/02/25 2024 01/02/25  1724 01/02/25  1722 01/02/25  1507 01/02/25 0333   *  --  175*  --  112*  --  67*   ALT 65  --  62  --  33  --  18   ALKPHOS 54  --  59  --  52  --  82   BILITOTAL 0.5  --  0.9  --  0.8  --  0.3    ALBUMIN 2.4*  --  2.3*  --  2.0*  --  3.1*   INR 1.29* 1.42* 1.43* 1.59*  --    < > 1.18*    < > = values in this interval not displayed.       Pancreatic Enzymes  Recent Labs   Lab 12/31/24  0752   LIPASE 10*       Coagulation Profile  Recent Labs   Lab 01/03/25  0336 01/02/25  2155 01/02/25 2024 01/02/25  1724   INR 1.29* 1.42* 1.43* 1.59*   PTT 35 39* 47* 71*       IMAGING:  Recent Results (from the past 24 hours)   XR Chest Port 1 View    Narrative    EXAM: XR CHEST PORT 1 VIEW 1/2/2025 5:49 PM    INDICATION: s/p CABG x4 with left atrial appendage ligation    COMPARISON: Chest radiograph 1/2/2025 at 0011 hours.    TECHNIQUE: Single AP view of the chest.    FINDINGS:   Endotracheal tube at the mid thoracic trachea. Lower approach venous  ECMO cannula tip terminates at the low SVC. New right IJ Stow-Bala  catheter tip terminates at the main pulmonary artery. Multiple  mediastinal drains and left basilar chest tube appear new from  previous exam. Left atrial appendage clip is new. Median sternotomy  wires are intact. Gastric tube tip and side-port project over the  lower esophagus. Spinal stimulator leads partially visualized.    Trachea is midline when accounting for rotation.  Cardiac silhouette  is normal in size.  No focal pulmonary consolidation.  Linear opacity  along the lateral left midlung zone. No pleural effusion or  pneumothorax.  Bones and soft tissues are unremarkable.      Impression    IMPRESSION:   1. Changes of cardiothoracic surgery with multiple new support devices  as described above, all of which appear appropriately positioned.  2. Left lung segmental atelectasis. No pleural effusion or  pneumothorax.    I have personally reviewed the examination and initial interpretation  and I agree with the findings.    ABIGAIL WELLER MD         SYSTEM ID:  [id number]   XR Abdomen Port 1 View    Narrative    Exam: XR ABDOMEN PORT 1 VIEW, 1/2/2025 7:13 PM    Indication: OG placement  confirm    Comparison: Abdomen 12/31/2024.    Findings:   Portable AP supine abdominal radiograph. OG tube tip and sidehole  overlying the stomach with tip lying superiorly in the fundus,  advanced from prior chest radiograph. Lower approach right-sided ECMO  cannula with tips terminating over the lower lumbar spine and  extending into the thorax outside of the field of view. There are also  left femoral catheter is in place with tips overlying the lumbosacral  junction. Mediastinal drains, partially visualized Lillian-Bala catheter  and left basilar chest tube noted. Spinal catheters overlying the  lower thoracic spine. Sternotomy wires. Please see same day chest  radiographs. Tip of a Rodriguez catheter in the bladder noted.    Nonobstructive bowel gas pattern. Degenerative changes.      Impression    Impression: OG tube has been advanced from same day chest radiograph  with tip and sidehole now overlying the stomach. Multiple additional  support devices as above. Please see same day chest radiograph.    ABIGAIL WELLER MD         SYSTEM ID:  [id number]   XR Chest Port 1 View    Narrative    Exam: XR CHEST PORT 1 VIEW, 1/3/2025 1:58 AM    Indication: VA ECMO, intubated, chest tubes s/p CABG x4.    Comparison: Chest x-ray 1/2/2025    Findings:   Portable supine AP radiograph of the chest. Endotracheal tube  terminates in the midthoracic trachea. Inferior approach venous ECMO  cannula terminates in the low SVC. Right IJ Lillian-Bala catheter tip  terminates in the main pulmonary artery. Lower thoracic epidural  stimulator leads are in stable position. Epicardial pacer wires.  Postoperative changes of coronary artery bypass graft. Left basilar  chest tube. Mediastinal surgical clips, atrial appendage clip, and  sternotomy wires are stable.    Trachea is midline. The cardiac mediastinal silhouette is within  normal limits. Distinct pulmonary vasculature. No focal pulmonary  opacity. Normal lung volumes. Mild elevation of the  right  hemidiaphragm. Upper abdomen is unremarkable. No acute osseous or soft  tissue abnormality.      Impression    Impression:   1. Stable projection of support devices and surgical hardware.  2. No focal pneumonia, pneumothorax, or pleural effusion.    I have personally reviewed the examination and initial interpretation  and I agree with the findings.    LUIS MAIN MD         SYSTEM ID:  [id number]

## 2025-01-03 NOTE — PROGRESS NOTES
CLINICAL NUTRITION SERVICES - BRIEF NOTE    Received provider consult for nutrition education with comments post op cardiovascular surgery (automatic consult on post-op order set). S/p Left Internal Mammary Artery [place], Endoscopic [place] of Left Greater Saphenous Vein, Coronary Artery Bypass Graft x 4, Left Atrial Appendage Ligation, Left Atrial Ablation on 1/2. Nutrition education to be completed as able/appropriate (as pt s/p CABG and/or initial VAD).    RD will follow per LOS protocol or if re-consulted.     Liz Garcia RD, LD  Available on Vocera - can search by name or unit Dietitian  **Clinical Nutrition is no longer available via pager

## 2025-01-03 NOTE — PROGRESS NOTES
[facility]    ECLS Shift Summary:     ECMO Equipment:  Console Serial Number: [id number]  Circuit Lot Number: [id number]  Oxygenator Lot Number: [id number]          Circuit Assessment: Fibrin  Fibrin Location: connectors and down face of oxy    Arterial ECMO Cannula: 17 Fr in the Right Femoral Artery  Venous ECMO Cannula: 25 Fr in the Right Femoral Vein  Distal Perfusion Catheter: 8 Fr in the Right Superficial Femoral Artery    ECMO Cannula Venous Right femoral vein-Site Assessment: Unable to visualize  ECMO Cannula Arterial Right femoral artery-Site Assessment: Unable to visualize  Distal Perfusion Catheter Right superficial femoral artery-Site Assessment: WDL  ECMO Cannula Venous Right femoral vein-Site Intervention: No intervention needed  ECMO Cannula Arterial Right femoral artery-Site Intervention: No intervention needed  Distal Perfusion Catheter Right superficial femoral artery-Site Intervention: No intervention needed    Patient remains on V-A ECMO, all equipment is functioning and alarms are appropriately set. RPM's: 3500 with Blood Flow (Circuit) LPM  Avg: 3.9 LPM  Min: 3.88 LPM  Max: 3.92 LPM L/min. Sweep is at 1  LPM and (S) 60 % (redraw post oxy). Extremities are warm.     Significant Shift Events: Intermittent chugging in the first half of shift. 1500 LR given and 2 Platelets. Chugging resolved.    Vent settings:  FiO2 (%): 30 %, Resp: 14, Vent Mode: CMV/AC, Resp Rate (Set): 14 breaths/min, Tidal Volume (Set, mL): 480 mL, PEEP (cm H2O): 8 cmH2O, Resp Rate (Set): 14 breaths/min, Tidal Volume (Set, mL): 480 mL, PEEP (cm H2O): 8 cmH2O    Anticoagulation:  Dose (units/hr) HEParin: 0 Units/hr  Rate (mL/hr) HEParin: 0 mL/hr  Concentration HEParin: 100 Units/mL        Most recent: ACT  (seconds): 129 seconds    Urine output is charted per RN.  .  Blood loss was minimal. Product given included 2 Platelets and 1500 mL LR.     Intake/Output Summary (Last 24 hours) at  1/3/2025 0600  Last data filed at 1/3/2025 0500  Gross per 24 hour   Intake 8272.43 ml   Output 2691 ml   Net 5581.43 ml       Labs:  Recent Labs   Lab 01/03/25  0549 01/03/25  0417 01/03/25  0336 01/03/25  0222   PH 7.54* 7.43 7.47* 7.48*   PCO2 34* 46* 41 44   PO2 142* 176* 88 121*   HCO3 29* 31* 30* 32*   O2PER 30 60 30  30  50 40       Lab Results   Component Value Date    HGB 9.2 (L) 01/03/2025    PHGB 60 (H) 01/03/2025    PLT 97 (L) 01/03/2025    FIBR 301 01/03/2025    INR 1.29 (H) 01/03/2025    PTT 35 01/03/2025    DD 0.79 (H) 01/03/2025    ANTCH 64 (L) 01/02/2025       Plan is continue on VA ECMO with potential decannulation as an add on to OR schedule today.    Vicki Santana, RT  ECMO Specialist  1/3/2025 6:00 AM

## 2025-01-03 NOTE — PROGRESS NOTES
Critical Care Attending Progress Note  I, Rosemarie Ocasio MD, PhD saw this patient with the DELIA and agree with the findings and plan of care as documented in the note. Please see separate note from today for further documentation.     I personally reviewed vital signs, medications, labs and imaging.    Assessment:   63 yo M w DM2, HTN, PVD, and neuropathic pain   1/2: s/p CABG x 4.    1/3: ECMO decan    Plan: stabilize, neuro check, wean MV for extubation. Patient has been on midaz for a while so wake up will be delayed.    Active problems and current treatments include:     Acute postop pain: multimodal analgesia, reduce sedation  Respiratory insufficiency: wean MV, SBT for extubation in afternoon  Klebsiella pneumonia: ceftriaxone for 7 days  Cardiogenic and vasogenic shock: ECMO decan this am, currently off vasopressors, CI 2.3, calculate Lakisha @noon- CI 2.4  ID: periop abx  Nutrition: NPO,  bedside swallow afer extubation  Lines: PAC, eder, ca  PPX: start heparin, PPI   DISPO: CVICU          I personally managed the ventilator, hemodynamics, sedation, analgesia, metabolic abnormalities and nutritional status.    I agree with the assessment and plan. I spent 43 minutes exclusive of procedures evaluating and managing this patient, discussing with the consultants, and updating the patient and family.     Rosemarie Ocasio MD, PhD

## 2025-01-03 NOTE — PLAN OF CARE
Major Shift Events: Deep sedated at -4 on prop, Versed, and fentanyl.  Sedation vacation not completed. Pupils equal and reactive.  Afebrile. SR at 60-90, Convert to paced rhythm at 5am, Amio stopped. 1.5 ml LR given for ECMO chugging. Off all pressor. VC/AC on vent: Fio2 40%, rr 14, tv, 480, peep 8. No BM yet, bowel meds given. Rodriguez intact 50 - 150/hr. Bleeding noted on right internal jugular site, dressing changed x2, quick clot dressing used to stop bleeding. Platelets transfused x2. Phosphate replaced.     Drips:   Versed 3  Prop 40  Fentanyl 150  Insulin 2    Plan: De-cannulate.    For vital signs and complete assessments, please see documentation flowsheets.     Overall Patient Progress: improving.

## 2025-01-04 ENCOUNTER — APPOINTMENT (OUTPATIENT)
Dept: CT IMAGING | Facility: CLINIC | Age: 65
DRG: 003 | End: 2025-01-04
Payer: MEDICARE

## 2025-01-04 ENCOUNTER — APPOINTMENT (OUTPATIENT)
Dept: GENERAL RADIOLOGY | Facility: CLINIC | Age: 65
DRG: 003 | End: 2025-01-04
Attending: STUDENT IN AN ORGANIZED HEALTH CARE EDUCATION/TRAINING PROGRAM
Payer: MEDICARE

## 2025-01-04 LAB
ALBUMIN MFR UR ELPH: 39.5 MG/DL
ALBUMIN SERPL BCG-MCNC: 2.4 G/DL (ref 3.5–5.2)
ALBUMIN UR-MCNC: 20 MG/DL
ALLEN'S TEST: ABNORMAL
ALP SERPL-CCNC: 66 U/L (ref 40–150)
ALT SERPL W P-5'-P-CCNC: 213 U/L (ref 0–70)
ANION GAP SERPL CALCULATED.3IONS-SCNC: 10 MMOL/L (ref 7–15)
ANION GAP SERPL CALCULATED.3IONS-SCNC: 14 MMOL/L (ref 7–15)
ANION GAP SERPL CALCULATED.3IONS-SCNC: 16 MMOL/L (ref 7–15)
APPEARANCE UR: CLEAR
AST SERPL W P-5'-P-CCNC: 472 U/L (ref 0–45)
BASE EXCESS BLDA CALC-SCNC: 0.8 MMOL/L (ref -3–3)
BASE EXCESS BLDA CALC-SCNC: 2.5 MMOL/L (ref -3–3)
BASE EXCESS BLDA CALC-SCNC: 3.6 MMOL/L (ref -3–3)
BASE EXCESS BLDA CALC-SCNC: 4.1 MMOL/L (ref -3–3)
BASE EXCESS BLDA CALC-SCNC: 4.2 MMOL/L (ref -3–3)
BASE EXCESS BLDA CALC-SCNC: 4.6 MMOL/L (ref -3–3)
BASE EXCESS BLDA CALC-SCNC: 5.1 MMOL/L (ref -3–3)
BASE EXCESS BLDV CALC-SCNC: 2 MMOL/L (ref -3–3)
BASE EXCESS BLDV CALC-SCNC: 4.1 MMOL/L (ref -3–3)
BASE EXCESS BLDV CALC-SCNC: 4.5 MMOL/L (ref -3–3)
BASE EXCESS BLDV CALC-SCNC: 5.1 MMOL/L (ref -3–3)
BASE EXCESS BLDV CALC-SCNC: 6.2 MMOL/L (ref -3–3)
BASE EXCESS BLDV CALC-SCNC: 6.4 MMOL/L (ref -3–3)
BILIRUB SERPL-MCNC: 0.7 MG/DL
BILIRUB UR QL STRIP: NEGATIVE
BUN SERPL-MCNC: 37.8 MG/DL (ref 8–23)
BUN SERPL-MCNC: 40 MG/DL (ref 8–23)
BUN SERPL-MCNC: 45.2 MG/DL (ref 8–23)
CA-I BLD-MCNC: 4.3 MG/DL (ref 4.4–5.2)
CALCIUM SERPL-MCNC: 7.7 MG/DL (ref 8.8–10.4)
CALCIUM SERPL-MCNC: 7.9 MG/DL (ref 8.8–10.4)
CALCIUM SERPL-MCNC: 8.1 MG/DL (ref 8.8–10.4)
CHLORIDE SERPL-SCNC: 109 MMOL/L (ref 98–107)
CHLORIDE SERPL-SCNC: 111 MMOL/L (ref 98–107)
CHLORIDE SERPL-SCNC: 114 MMOL/L (ref 98–107)
COLOR UR AUTO: YELLOW
CREAT SERPL-MCNC: 1.39 MG/DL (ref 0.67–1.17)
CREAT SERPL-MCNC: 1.57 MG/DL (ref 0.67–1.17)
CREAT SERPL-MCNC: 1.59 MG/DL (ref 0.67–1.17)
CREAT UR-MCNC: 136 MG/DL
EGFRCR SERPLBLD CKD-EPI 2021: 48 ML/MIN/1.73M2
EGFRCR SERPLBLD CKD-EPI 2021: 49 ML/MIN/1.73M2
EGFRCR SERPLBLD CKD-EPI 2021: 56 ML/MIN/1.73M2
ERYTHROCYTE [DISTWIDTH] IN BLOOD BY AUTOMATED COUNT: 16 % (ref 10–15)
ERYTHROCYTE [DISTWIDTH] IN BLOOD BY AUTOMATED COUNT: 16.8 % (ref 10–15)
GABAPENTIN SERPLBLD QL SCN: POSITIVE
GABAPENTIN SERPLBLD-MCNC: 1.7 UG/ML
GLUCOSE BLDC GLUCOMTR-MCNC: 105 MG/DL (ref 70–99)
GLUCOSE BLDC GLUCOMTR-MCNC: 110 MG/DL (ref 70–99)
GLUCOSE BLDC GLUCOMTR-MCNC: 115 MG/DL (ref 70–99)
GLUCOSE BLDC GLUCOMTR-MCNC: 122 MG/DL (ref 70–99)
GLUCOSE BLDC GLUCOMTR-MCNC: 128 MG/DL (ref 70–99)
GLUCOSE BLDC GLUCOMTR-MCNC: 136 MG/DL (ref 70–99)
GLUCOSE BLDC GLUCOMTR-MCNC: 147 MG/DL (ref 70–99)
GLUCOSE BLDC GLUCOMTR-MCNC: 155 MG/DL (ref 70–99)
GLUCOSE BLDC GLUCOMTR-MCNC: 158 MG/DL (ref 70–99)
GLUCOSE BLDC GLUCOMTR-MCNC: 158 MG/DL (ref 70–99)
GLUCOSE BLDC GLUCOMTR-MCNC: 161 MG/DL (ref 70–99)
GLUCOSE BLDC GLUCOMTR-MCNC: 163 MG/DL (ref 70–99)
GLUCOSE BLDC GLUCOMTR-MCNC: 166 MG/DL (ref 70–99)
GLUCOSE BLDC GLUCOMTR-MCNC: 167 MG/DL (ref 70–99)
GLUCOSE BLDC GLUCOMTR-MCNC: 170 MG/DL (ref 70–99)
GLUCOSE BLDC GLUCOMTR-MCNC: 174 MG/DL (ref 70–99)
GLUCOSE BLDC GLUCOMTR-MCNC: 180 MG/DL (ref 70–99)
GLUCOSE BLDC GLUCOMTR-MCNC: 210 MG/DL (ref 70–99)
GLUCOSE SERPL-MCNC: 117 MG/DL (ref 70–99)
GLUCOSE SERPL-MCNC: 168 MG/DL (ref 70–99)
GLUCOSE SERPL-MCNC: 173 MG/DL (ref 70–99)
GLUCOSE UR STRIP-MCNC: NEGATIVE MG/DL
HCO3 BLD-SCNC: 22 MMOL/L (ref 21–28)
HCO3 BLD-SCNC: 24 MMOL/L (ref 21–28)
HCO3 BLD-SCNC: 25 MMOL/L (ref 21–28)
HCO3 BLD-SCNC: 26 MMOL/L (ref 21–28)
HCO3 BLD-SCNC: 27 MMOL/L (ref 21–28)
HCO3 BLDV-SCNC: 25 MMOL/L (ref 21–28)
HCO3 BLDV-SCNC: 26 MMOL/L (ref 21–28)
HCO3 BLDV-SCNC: 27 MMOL/L (ref 21–28)
HCO3 BLDV-SCNC: 28 MMOL/L (ref 21–28)
HCO3 BLDV-SCNC: 29 MMOL/L (ref 21–28)
HCO3 BLDV-SCNC: 29 MMOL/L (ref 21–28)
HCO3 SERPL-SCNC: 20 MMOL/L (ref 22–29)
HCO3 SERPL-SCNC: 22 MMOL/L (ref 22–29)
HCO3 SERPL-SCNC: 24 MMOL/L (ref 22–29)
HCT VFR BLD AUTO: 21 % (ref 40–53)
HCT VFR BLD AUTO: 21.5 % (ref 40–53)
HGB BLD-MCNC: 7.3 G/DL (ref 13.3–17.7)
HGB BLD-MCNC: 7.4 G/DL (ref 13.3–17.7)
HGB UR QL STRIP: ABNORMAL
KETONES UR STRIP-MCNC: ABNORMAL MG/DL
LACTATE SERPL-SCNC: 1.3 MMOL/L (ref 0.7–2)
LACTATE SERPL-SCNC: 1.5 MMOL/L (ref 0.7–2)
LACTATE SERPL-SCNC: 1.6 MMOL/L (ref 0.7–2)
LACTATE SERPL-SCNC: 2 MMOL/L (ref 0.7–2)
LACTATE SERPL-SCNC: 2 MMOL/L (ref 0.7–2)
LACTATE SERPL-SCNC: 2.1 MMOL/L (ref 0.7–2)
LACTATE SERPL-SCNC: 4.7 MMOL/L (ref 0.7–2)
LACTATE SERPL-SCNC: 6.3 MMOL/L (ref 0.7–2)
LACTATE SERPL-SCNC: 6.4 MMOL/L (ref 0.7–2)
LEUKOCYTE ESTERASE UR QL STRIP: NEGATIVE
MAGNESIUM SERPL-MCNC: 2.2 MG/DL (ref 1.7–2.3)
MCH RBC QN AUTO: 29 PG (ref 26.5–33)
MCH RBC QN AUTO: 30 PG (ref 26.5–33)
MCHC RBC AUTO-ENTMCNC: 34.4 G/DL (ref 31.5–36.5)
MCHC RBC AUTO-ENTMCNC: 34.8 G/DL (ref 31.5–36.5)
MCV RBC AUTO: 84 FL (ref 78–100)
MCV RBC AUTO: 86 FL (ref 78–100)
MUCOUS THREADS #/AREA URNS LPF: PRESENT /LPF
NITRATE UR QL: NEGATIVE
O2/TOTAL GAS SETTING VFR VENT: 30 %
O2/TOTAL GAS SETTING VFR VENT: 40 %
O2/TOTAL GAS SETTING VFR VENT: 50 %
OXYHGB MFR BLDA: 91 % (ref 92–100)
OXYHGB MFR BLDA: 92 % (ref 92–100)
OXYHGB MFR BLDA: 92 % (ref 92–100)
OXYHGB MFR BLDA: 95 % (ref 92–100)
OXYHGB MFR BLDA: 97 % (ref 92–100)
OXYHGB MFR BLDA: 98 % (ref 92–100)
OXYHGB MFR BLDA: 98 % (ref 92–100)
OXYHGB MFR BLDV: 35 % (ref 70–75)
OXYHGB MFR BLDV: 36 % (ref 70–75)
OXYHGB MFR BLDV: 50 % (ref 70–75)
OXYHGB MFR BLDV: 50 % (ref 70–75)
OXYHGB MFR BLDV: 55 % (ref 70–75)
OXYHGB MFR BLDV: 62 % (ref 70–75)
PCO2 BLD: 23 MM HG (ref 35–45)
PCO2 BLD: 24 MM HG (ref 35–45)
PCO2 BLD: 26 MM HG (ref 35–45)
PCO2 BLD: 26 MM HG (ref 35–45)
PCO2 BLD: 27 MM HG (ref 35–45)
PCO2 BLD: 27 MM HG (ref 35–45)
PCO2 BLD: 29 MM HG (ref 35–45)
PCO2 BLDV: 29 MM HG (ref 40–50)
PCO2 BLDV: 29 MM HG (ref 40–50)
PCO2 BLDV: 32 MM HG (ref 40–50)
PCO2 BLDV: 32 MM HG (ref 40–50)
PCO2 BLDV: 33 MM HG (ref 40–50)
PCO2 BLDV: 33 MM HG (ref 40–50)
PEEP: 8 CM H2O
PH BLD: 7.57 [PH] (ref 7.35–7.45)
PH BLD: 7.58 [PH] (ref 7.35–7.45)
PH BLD: 7.59 [PH] (ref 7.35–7.45)
PH BLD: 7.59 [PH] (ref 7.35–7.45)
PH BLD: 7.6 [PH] (ref 7.35–7.45)
PH BLD: 7.6 [PH] (ref 7.35–7.45)
PH BLD: 7.62 [PH] (ref 7.35–7.45)
PH BLDV: 7.54 [PH] (ref 7.32–7.43)
PH BLDV: 7.55 [PH] (ref 7.32–7.43)
PH BLDV: 7.56 [PH] (ref 7.32–7.43)
PH BLDV: 7.57 [PH] (ref 7.32–7.43)
PH UR STRIP: 5.5 [PH] (ref 5–7)
PHOSPHATE SERPL-MCNC: 1.5 MG/DL (ref 2.5–4.5)
PHOSPHATE SERPL-MCNC: 2.2 MG/DL (ref 2.5–4.5)
PLAT MORPH BLD: ABNORMAL
PLATELET # BLD AUTO: 143 10E3/UL (ref 150–450)
PLATELET # BLD AUTO: 94 10E3/UL (ref 150–450)
PO2 BLD: 128 MM HG (ref 80–105)
PO2 BLD: 55 MM HG (ref 80–105)
PO2 BLD: 57 MM HG (ref 80–105)
PO2 BLD: 59 MM HG (ref 80–105)
PO2 BLD: 73 MM HG (ref 80–105)
PO2 BLD: 78 MM HG (ref 80–105)
PO2 BLD: 97 MM HG (ref 80–105)
PO2 BLDV: 21 MM HG (ref 25–47)
PO2 BLDV: 22 MM HG (ref 25–47)
PO2 BLDV: 26 MM HG (ref 25–47)
PO2 BLDV: 27 MM HG (ref 25–47)
PO2 BLDV: 29 MM HG (ref 25–47)
PO2 BLDV: 32 MM HG (ref 25–47)
POLYCHROMASIA BLD QL SMEAR: SLIGHT
POTASSIUM SERPL-SCNC: 3.2 MMOL/L (ref 3.4–5.3)
POTASSIUM SERPL-SCNC: 3.5 MMOL/L (ref 3.4–5.3)
POTASSIUM SERPL-SCNC: 3.7 MMOL/L (ref 3.4–5.3)
POTASSIUM SERPL-SCNC: 3.8 MMOL/L (ref 3.4–5.3)
PROT SERPL-MCNC: 4.1 G/DL (ref 6.4–8.3)
PROT/CREAT 24H UR: 0.29 MG/MG CR (ref 0–0.2)
RADIOLOGIST FLAGS: ABNORMAL
RBC # BLD AUTO: 2.43 10E6/UL (ref 4.4–5.9)
RBC # BLD AUTO: 2.55 10E6/UL (ref 4.4–5.9)
RBC MORPH BLD: ABNORMAL
RBC URINE: <1 /HPF
SAO2 % BLDA: 92.6 % (ref 96–97)
SAO2 % BLDA: 93.9 % (ref 96–97)
SAO2 % BLDA: 94 % (ref 96–97)
SAO2 % BLDA: 96.8 % (ref 96–97)
SAO2 % BLDA: 98.7 % (ref 96–97)
SAO2 % BLDA: 99.7 % (ref 96–97)
SAO2 % BLDA: 99.9 % (ref 96–97)
SAO2 % BLDV: 35.6 % (ref 70–75)
SAO2 % BLDV: 37.1 % (ref 70–75)
SAO2 % BLDV: 50.7 % (ref 70–75)
SAO2 % BLDV: 50.8 % (ref 70–75)
SAO2 % BLDV: 55.8 % (ref 70–75)
SAO2 % BLDV: 63.8 % (ref 70–75)
SODIUM SERPL-SCNC: 145 MMOL/L (ref 135–145)
SODIUM SERPL-SCNC: 147 MMOL/L (ref 135–145)
SODIUM SERPL-SCNC: 148 MMOL/L (ref 135–145)
SP GR UR STRIP: 1.03 (ref 1–1.03)
SQUAMOUS EPITHELIAL: <1 /HPF
UROBILINOGEN UR STRIP-MCNC: NORMAL MG/DL
WBC # BLD AUTO: 13.7 10E3/UL (ref 4–11)
WBC # BLD AUTO: 21 10E3/UL (ref 4–11)
WBC URINE: 3 /HPF

## 2025-01-04 PROCEDURE — 999N000157 HC STATISTIC RCP TIME EA 10 MIN

## 2025-01-04 PROCEDURE — 250N000013 HC RX MED GY IP 250 OP 250 PS 637

## 2025-01-04 PROCEDURE — 250N000009 HC RX 250

## 2025-01-04 PROCEDURE — 84132 ASSAY OF SERUM POTASSIUM: CPT | Performed by: NURSE PRACTITIONER

## 2025-01-04 PROCEDURE — 83735 ASSAY OF MAGNESIUM: CPT

## 2025-01-04 PROCEDURE — 258N000003 HC RX IP 258 OP 636

## 2025-01-04 PROCEDURE — 82310 ASSAY OF CALCIUM: CPT

## 2025-01-04 PROCEDURE — 82310 ASSAY OF CALCIUM: CPT | Performed by: NURSE PRACTITIONER

## 2025-01-04 PROCEDURE — 99291 CRITICAL CARE FIRST HOUR: CPT | Mod: 24 | Performed by: STUDENT IN AN ORGANIZED HEALTH CARE EDUCATION/TRAINING PROGRAM

## 2025-01-04 PROCEDURE — 250N000013 HC RX MED GY IP 250 OP 250 PS 637: Performed by: STUDENT IN AN ORGANIZED HEALTH CARE EDUCATION/TRAINING PROGRAM

## 2025-01-04 PROCEDURE — 84100 ASSAY OF PHOSPHORUS: CPT | Performed by: STUDENT IN AN ORGANIZED HEALTH CARE EDUCATION/TRAINING PROGRAM

## 2025-01-04 PROCEDURE — 250N000011 HC RX IP 250 OP 636: Performed by: PHYSICIAN ASSISTANT

## 2025-01-04 PROCEDURE — 85018 HEMOGLOBIN: CPT | Performed by: NURSE PRACTITIONER

## 2025-01-04 PROCEDURE — 81003 URINALYSIS AUTO W/O SCOPE: CPT

## 2025-01-04 PROCEDURE — 0W9930Z DRAINAGE OF RIGHT PLEURAL CAVITY WITH DRAINAGE DEVICE, PERCUTANEOUS APPROACH: ICD-10-PCS | Performed by: ANESTHESIOLOGY

## 2025-01-04 PROCEDURE — 84100 ASSAY OF PHOSPHORUS: CPT

## 2025-01-04 PROCEDURE — 200N000002 HC R&B ICU UMMC

## 2025-01-04 PROCEDURE — 84156 ASSAY OF PROTEIN URINE: CPT

## 2025-01-04 PROCEDURE — 71250 CT THORAX DX C-: CPT | Mod: 26 | Performed by: RADIOLOGY

## 2025-01-04 PROCEDURE — 71045 X-RAY EXAM CHEST 1 VIEW: CPT | Mod: 26 | Performed by: RADIOLOGY

## 2025-01-04 PROCEDURE — 82330 ASSAY OF CALCIUM: CPT | Performed by: PHYSICIAN ASSISTANT

## 2025-01-04 PROCEDURE — 250N000013 HC RX MED GY IP 250 OP 250 PS 637: Performed by: NURSE PRACTITIONER

## 2025-01-04 PROCEDURE — 82805 BLOOD GASES W/O2 SATURATION: CPT

## 2025-01-04 PROCEDURE — 83605 ASSAY OF LACTIC ACID: CPT

## 2025-01-04 PROCEDURE — 250N000011 HC RX IP 250 OP 636: Performed by: NURSE PRACTITIONER

## 2025-01-04 PROCEDURE — 250N000013 HC RX MED GY IP 250 OP 250 PS 637: Performed by: PHYSICIAN ASSISTANT

## 2025-01-04 PROCEDURE — 80048 BASIC METABOLIC PNL TOTAL CA: CPT | Performed by: STUDENT IN AN ORGANIZED HEALTH CARE EDUCATION/TRAINING PROGRAM

## 2025-01-04 PROCEDURE — 250N000011 HC RX IP 250 OP 636

## 2025-01-04 PROCEDURE — 85041 AUTOMATED RBC COUNT: CPT | Performed by: NURSE PRACTITIONER

## 2025-01-04 PROCEDURE — 84132 ASSAY OF SERUM POTASSIUM: CPT | Performed by: STUDENT IN AN ORGANIZED HEALTH CARE EDUCATION/TRAINING PROGRAM

## 2025-01-04 PROCEDURE — 87070 CULTURE OTHR SPECIMN AEROBIC: CPT | Performed by: STUDENT IN AN ORGANIZED HEALTH CARE EDUCATION/TRAINING PROGRAM

## 2025-01-04 PROCEDURE — 82040 ASSAY OF SERUM ALBUMIN: CPT

## 2025-01-04 PROCEDURE — P9016 RBC LEUKOCYTES REDUCED: HCPCS | Performed by: PHYSICIAN ASSISTANT

## 2025-01-04 PROCEDURE — 82805 BLOOD GASES W/O2 SATURATION: CPT | Performed by: NURSE PRACTITIONER

## 2025-01-04 PROCEDURE — 71250 CT THORAX DX C-: CPT

## 2025-01-04 PROCEDURE — 87205 SMEAR GRAM STAIN: CPT | Performed by: STUDENT IN AN ORGANIZED HEALTH CARE EDUCATION/TRAINING PROGRAM

## 2025-01-04 PROCEDURE — 94003 VENT MGMT INPAT SUBQ DAY: CPT

## 2025-01-04 PROCEDURE — 250N000011 HC RX IP 250 OP 636: Performed by: STUDENT IN AN ORGANIZED HEALTH CARE EDUCATION/TRAINING PROGRAM

## 2025-01-04 PROCEDURE — 99222 1ST HOSP IP/OBS MODERATE 55: CPT | Mod: 25 | Performed by: INTERNAL MEDICINE

## 2025-01-04 PROCEDURE — 74176 CT ABD & PELVIS W/O CONTRAST: CPT | Mod: 26 | Performed by: RADIOLOGY

## 2025-01-04 PROCEDURE — 71045 X-RAY EXAM CHEST 1 VIEW: CPT

## 2025-01-04 PROCEDURE — 84155 ASSAY OF PROTEIN SERUM: CPT

## 2025-01-04 RX ORDER — DOBUTAMINE HYDROCHLORIDE 200 MG/100ML
2.5 INJECTION INTRAVENOUS CONTINUOUS
Status: DISCONTINUED | OUTPATIENT
Start: 2025-01-04 | End: 2025-01-04

## 2025-01-04 RX ORDER — BISACODYL 10 MG
10 SUPPOSITORY, RECTAL RECTAL DAILY PRN
Status: DISCONTINUED | OUTPATIENT
Start: 2025-01-04 | End: 2025-01-25 | Stop reason: HOSPADM

## 2025-01-04 RX ORDER — NOREPINEPHRINE BITARTRATE 0.06 MG/ML
.01-.6 INJECTION, SOLUTION INTRAVENOUS CONTINUOUS
Status: DISCONTINUED | OUTPATIENT
Start: 2025-01-04 | End: 2025-01-09

## 2025-01-04 RX ORDER — POTASSIUM CHLORIDE 20MEQ/15ML
40 LIQUID (ML) ORAL ONCE
Status: COMPLETED | OUTPATIENT
Start: 2025-01-04 | End: 2025-01-04

## 2025-01-04 RX ORDER — AMOXICILLIN 250 MG
2 CAPSULE ORAL 2 TIMES DAILY
Status: DISCONTINUED | OUTPATIENT
Start: 2025-01-04 | End: 2025-01-05

## 2025-01-04 RX ORDER — PROPOFOL 10 MG/ML
5-75 INJECTION, EMULSION INTRAVENOUS CONTINUOUS
Status: DISCONTINUED | OUTPATIENT
Start: 2025-01-04 | End: 2025-01-04

## 2025-01-04 RX ORDER — EPINEPHRINE IN 0.9 % SOD CHLOR 5 MG/250ML
.01-.3 PLASTIC BAG, INJECTION (ML) INTRAVENOUS CONTINUOUS
Status: DISCONTINUED | OUTPATIENT
Start: 2025-01-04 | End: 2025-01-04

## 2025-01-04 RX ORDER — DOBUTAMINE HYDROCHLORIDE 200 MG/100ML
2.5 INJECTION INTRAVENOUS CONTINUOUS
Status: DISCONTINUED | OUTPATIENT
Start: 2025-01-04 | End: 2025-01-05

## 2025-01-04 RX ORDER — DEXMEDETOMIDINE HYDROCHLORIDE 4 UG/ML
.1-1.2 INJECTION, SOLUTION INTRAVENOUS CONTINUOUS
Status: DISCONTINUED | OUTPATIENT
Start: 2025-01-04 | End: 2025-01-10

## 2025-01-04 RX ORDER — POTASSIUM CHLORIDE 20MEQ/15ML
20 LIQUID (ML) ORAL ONCE
Status: COMPLETED | OUTPATIENT
Start: 2025-01-04 | End: 2025-01-04

## 2025-01-04 RX ORDER — POLYETHYLENE GLYCOL 3350 17 G/17G
17 POWDER, FOR SOLUTION ORAL 2 TIMES DAILY
Status: DISCONTINUED | OUTPATIENT
Start: 2025-01-04 | End: 2025-01-05

## 2025-01-04 RX ADMIN — BACLOFEN 10 MG: 10 TABLET ORAL at 07:56

## 2025-01-04 RX ADMIN — DEXMEDETOMIDINE HYDROCHLORIDE 0.2 MCG/KG/HR: 4 INJECTION, SOLUTION INTRAVENOUS at 12:23

## 2025-01-04 RX ADMIN — EPINEPHRINE 0.01 MCG/KG/MIN: 1 INJECTION INTRAMUSCULAR; INTRAVENOUS; SUBCUTANEOUS at 02:56

## 2025-01-04 RX ADMIN — POLYETHYLENE GLYCOL 3350 17 G: 17 POWDER, FOR SOLUTION ORAL at 21:00

## 2025-01-04 RX ADMIN — Medication 1 UNITS/HR: at 01:59

## 2025-01-04 RX ADMIN — SENNOSIDES AND DOCUSATE SODIUM 2 TABLET: 50; 8.6 TABLET ORAL at 20:54

## 2025-01-04 RX ADMIN — DOBUTAMINE HYDROCHLORIDE 2.5 MCG/KG/MIN: 200 INJECTION INTRAVENOUS at 04:30

## 2025-01-04 RX ADMIN — INSULIN HUMAN 4 UNITS/HR: 1 INJECTION, SOLUTION INTRAVENOUS at 16:15

## 2025-01-04 RX ADMIN — DOBUTAMINE HYDROCHLORIDE 2.5 MCG/KG/MIN: 200 INJECTION INTRAVENOUS at 00:14

## 2025-01-04 RX ADMIN — SERTRALINE HYDROCHLORIDE 200 MG: 100 TABLET ORAL at 08:00

## 2025-01-04 RX ADMIN — SODIUM CHLORIDE, POTASSIUM CHLORIDE, SODIUM LACTATE AND CALCIUM CHLORIDE 500 ML: 600; 310; 30; 20 INJECTION, SOLUTION INTRAVENOUS at 17:39

## 2025-01-04 RX ADMIN — SODIUM CHLORIDE, POTASSIUM CHLORIDE, SODIUM LACTATE AND CALCIUM CHLORIDE 500 ML: 600; 310; 30; 20 INJECTION, SOLUTION INTRAVENOUS at 13:39

## 2025-01-04 RX ADMIN — DEXMEDETOMIDINE HYDROCHLORIDE 0.5 MCG/KG/HR: 4 INJECTION, SOLUTION INTRAVENOUS at 20:55

## 2025-01-04 RX ADMIN — HYDRALAZINE HYDROCHLORIDE 10 MG: 20 INJECTION INTRAMUSCULAR; INTRAVENOUS at 18:48

## 2025-01-04 RX ADMIN — BACLOFEN 10 MG: 10 TABLET ORAL at 15:17

## 2025-01-04 RX ADMIN — ROSUVASTATIN CALCIUM 40 MG: 20 TABLET, FILM COATED ORAL at 08:00

## 2025-01-04 RX ADMIN — Medication 25 MCG/HR: at 02:53

## 2025-01-04 RX ADMIN — CHLORHEXIDINE GLUCONATE 15 ML: 1.2 SOLUTION ORAL at 07:56

## 2025-01-04 RX ADMIN — MAGNESIUM HYDROXIDE 30 ML: 400 SUSPENSION ORAL at 17:33

## 2025-01-04 RX ADMIN — POTASSIUM CHLORIDE 20 MEQ: 20 SOLUTION ORAL at 14:35

## 2025-01-04 RX ADMIN — ASPIRIN 81 MG CHEWABLE TABLET 162 MG: 81 TABLET CHEWABLE at 08:01

## 2025-01-04 RX ADMIN — PIPERACILLIN SODIUM AND TAZOBACTAM SODIUM 4.5 G: 4; .5 INJECTION, SOLUTION INTRAVENOUS at 01:31

## 2025-01-04 RX ADMIN — PIPERACILLIN SODIUM AND TAZOBACTAM SODIUM 4.5 G: 4; .5 INJECTION, SOLUTION INTRAVENOUS at 12:10

## 2025-01-04 RX ADMIN — HEPARIN SODIUM 5000 UNITS: 5000 INJECTION, SOLUTION INTRAVENOUS; SUBCUTANEOUS at 15:18

## 2025-01-04 RX ADMIN — POLYETHYLENE GLYCOL 3350 17 G: 17 POWDER, FOR SOLUTION ORAL at 08:02

## 2025-01-04 RX ADMIN — PIPERACILLIN SODIUM AND TAZOBACTAM SODIUM 4.5 G: 4; .5 INJECTION, SOLUTION INTRAVENOUS at 18:44

## 2025-01-04 RX ADMIN — SODIUM CHLORIDE, POTASSIUM CHLORIDE, SODIUM LACTATE AND CALCIUM CHLORIDE 500 ML: 600; 310; 30; 20 INJECTION, SOLUTION INTRAVENOUS at 03:48

## 2025-01-04 RX ADMIN — CHLORHEXIDINE GLUCONATE 15 ML: 1.2 SOLUTION ORAL at 20:54

## 2025-01-04 RX ADMIN — PROPOFOL 10 MCG/KG/MIN: 10 INJECTION, EMULSION INTRAVENOUS at 02:52

## 2025-01-04 RX ADMIN — PANTOPRAZOLE SODIUM 40 MG: 40 INJECTION, POWDER, FOR SOLUTION INTRAVENOUS at 08:02

## 2025-01-04 RX ADMIN — PIPERACILLIN SODIUM AND TAZOBACTAM SODIUM 4.5 G: 4; .5 INJECTION, SOLUTION INTRAVENOUS at 08:02

## 2025-01-04 RX ADMIN — Medication 15 ML: at 08:02

## 2025-01-04 RX ADMIN — POTASSIUM CHLORIDE 40 MEQ: 20 SOLUTION ORAL at 07:56

## 2025-01-04 RX ADMIN — SENNOSIDES AND DOCUSATE SODIUM 1 TABLET: 50; 8.6 TABLET ORAL at 08:02

## 2025-01-04 RX ADMIN — HEPARIN SODIUM 5000 UNITS: 5000 INJECTION, SOLUTION INTRAVENOUS; SUBCUTANEOUS at 08:02

## 2025-01-04 RX ADMIN — Medication 2000 MG: at 03:01

## 2025-01-04 RX ADMIN — POTASSIUM & SODIUM PHOSPHATES POWDER PACK 280-160-250 MG 2 PACKET: 280-160-250 PACK at 14:35

## 2025-01-04 RX ADMIN — POTASSIUM & SODIUM PHOSPHATES POWDER PACK 280-160-250 MG 2 PACKET: 280-160-250 PACK at 10:50

## 2025-01-04 RX ADMIN — PROPOFOL 45 MCG/KG/MIN: 10 INJECTION, EMULSION INTRAVENOUS at 08:25

## 2025-01-04 RX ADMIN — POTASSIUM & SODIUM PHOSPHATES POWDER PACK 280-160-250 MG 2 PACKET: 280-160-250 PACK at 07:56

## 2025-01-04 ASSESSMENT — ACTIVITIES OF DAILY LIVING (ADL)
ADLS_ACUITY_SCORE: 51

## 2025-01-04 NOTE — PROCEDURES
Procedure Note - Chest Tube     Preoperative Diagnosis: pneumothorax    Postoperative Diagnosis: same    Operation Performed: right Chest Tube Thoracostomy    Surgeon: Hilario Ortiz MD    Indications: Erwin Aguilar is a 64 year old male requiring chest tube thoracostomy for large pneumothorax     Anesthesia:  Sedation     Findings: Large right PNX    Procedure Details:    The patient was in the supine position and the arm was abducted above the level of head for exposure of the axillary space. A scalpel was used to incise the skin through the dermis in a line that followed the rib laterally just inferior to the inframammary crease at roughly the 5th intercostal space. A Fairborn Peon was use to bluntly dissect down to the level of the parietal pleura, and was pushed through to enter the thoracic cavity, taking care not to injure the mediastinal structures or lung parenchyma. A finger then digitized the inside of the chest wall to ensure no significant adhesions were present. Once this was verified, a 28 Maltese chest tube was advanced towards the apex. The distal end remained clamped until attached to a pleur-e-vac suction device. The tube was fixated to the skin with 0-silk sutures and later dressed with an occlusive dressing. The patient tolerated the procedure well.    Signed:    Hilario Ortiz MD 1/4/2025 at 8:03 AM  Cardiothoracic Surgery Fellow

## 2025-01-04 NOTE — PROGRESS NOTES
CVTS Note    Notified by evening RN of elevated lactate (4) at 1600 on VBG, previously normal earlier in the AM. Also noted low UOP 10-20cc/hr with increasing creatinine. 500cc IVF given. Repeat lactate 4.6. Patient assessed, noted to be awake, moving all extremities, tachypneic (35) and appeared uncomfortable. Notified by RN that patient was hypertensive needing hydralazine for SBP>140 and new unequal pupils. CTH completed and negative. Patient taken off of sedation and pain medication gtts earlier in the day and had not received pain medications in ~12 hours. 50 of fentanyl given with improvement in RR. He was febrile with Tmax 102.6. Blood cx and UA sent. Ceftriaxone broadened to vanco/zosyn. Dobutamine started due to CI <2. Patient now hypotensive with MAP low 60s. Levophed and vasopressin added. Given hypotension, dobutamine was switched to epinephrine. Cardiology fellow performed bedside echo which had difficult views but appeared to have normal RV function and global LV hypokinesis. He recommended the additional of dobutamine to the already infusing epinephrine.  Lactate still elevated despite a total of 2L of IVF and inotropy. His CI did improve to 2.1. 1 unit of pRBC was given for hgb of 7.3. His persistently elevated lactate prompted CT chest/abdomen/pelvis (no contrast given elevated creatinine).  ABG continued to show respiratory alkalosis, but there have been no difficulties with oxygenation or ventilation. He was placed back on fentanyl and propofol gtts, which did help his tachypnea.     CT resulted with new large right sided pneumothorax with mild mediastinal shift. Will place chest tube.     Discussed with Dr. Ortiz and Dr. Rosenbaum.    Maria Luz Lozada  General Surgery Resident

## 2025-01-04 NOTE — PLAN OF CARE
Major Shift Events: Restarted Epi, Levo, Vaso, Dobutamine, 2L LR given, 1 Unit PRBC, Restarted all sedation, Tension pneumothorax found on right side causing tachypnea and resp alkalosis (Plan is for CTICU team to place a pleural CT on the right side), Lactic climbed to 6.5    Neuro:  Restarted sedation. PERRL.  Does not follow commands or track. Bites down on ETT/swabs. No clear cough with suctioning. Tmax bladder 102.6    CV:  SR 's with no ectopy. A wires connected to pacer rate 80. V wires disconnected. MAP goal > 70, SBP < 140. PRN hydralazine ordered for SBP > 140 (was given last night due to hypertensive episode).   0400 NADEEN  SvO2: 36, CI: 2, CO: 4.2, SVR: 875.4, SV: 42.9    Pulm:  Vent Mode: CMV 50%/14/480/8.  Tachypnea and has resolving resp alkaosis     GI:  OG @ 63. TF started at 20 mL/hr (goal). Bowel sounds hypoactive.  Last BM prior to admission.    :  Rodriguez with 10-25 mL/hr urine output    Skin:  Scabs on chin and right temple.Right temple.  Blister on first finger of right hand.  Blanchable erythema on sacrum/coccyx.  Healed right BKA.    Drips:  Norepi 0.02  Epi: .08  Dobutamine: 2.5  Vaso: Off  Prop: 45  Fent: 50  Insulin 4    Documenting RN assumed care of this patient from Date: 12/29 Time: 8301-5021. For vital signs, drip titrations, and complete assessments, please see documentation flowsheets; assessments charted by exception.  All ICU standards of care were followed.     Lio Persaud RN on 1/4/2025 at 8:02 AM

## 2025-01-04 NOTE — CONSULTS
Nephrology Initial Consult    Erwin Aguilar MRN:[id number] YOB: 1960  Date of Admission:12/31/2024  Primary care provider: Wegener, Joel Daniel Irwin  Requesting physician: Bernice Rosenbaum MD    ASSESSMENT AND RECOMMENDATIONS:   AUSTIN  - Baseline kidney function preserved with baseline Cr 0.6  - Etiology of AUSTIN most likely multifactorial including cardiac surgery associated AUSTIN, ischemic ATN given cardiac arrest and need for VA-ECMO. Now with persistent fevers, sepsis-associated ATN is also a consideration.  No e/o obstruction on imaging. Wilbarger UA and overall Cr trend argue against a GN or AIN.  - No acute need for dialysis at present -- had some UOP response with another small bolus of IVF today. Although he is net positive throughout admission so far, his persistent fevers will cause higher insensible losses (thus probably not actually as net positive over past day as ins/outs would suggest).  - Would consider another small fluid bolus tonight if remains persistently febrile (could do another 500 ml LR).  - Will continue to follow and assess for HD needs.     Acid/Base  - Respiratory alkalosis without metabolic compensation. Due to over breathing the vent. Duration of resp alkalosis is short enough that kidneys have not had time to respond with metabolic compensation.  - Would consider dose of acetazolamide 250 mg IV today    Hypernatremia  - Due to free water losses via insensible losses  - Free water deficit to get him to a Na of 144 is 1.4 L, would give via free water flushes (could start with 200 ml q4 hours)    Nephrology will continue to follow. Recommendations communicated to team via this note.    Jada Herr DO, MSCI   of Medicine, Division of Nephrology and Hypertension  [facility] Pager [phone]       REASON FOR CONSULT: AUSTIN    HISTORY OF PRESENT ILLNESS:  Erwin Aguilar is a 64 year old male with history of T2DM, HTN, and PVD who was admitted for STEMI after  presenting with angina on 12/31 which was complicated by cardiac arrest in ER. He was subsequently started on VA-ECMO and eventually had CABG on 1/2. ECMO was decannulated yesterday and he is off of pressors today -- remains on dobutamine. Over past day he has developed fevers  and lactic acidosis last night -- has been started on empiric vancomycin and zosyn and given LR boluses 500 ml x 2. LA down to 2 this AM but still having fevers. He was also found to have a large pneumothorax on CT this AM and has had R chest tube placed since then. We are consulted for AUSTIN. His Cr baseline is around 0.6 which it was on admission; on 1/2 started seeing uptrend to ~1, then yesterday up to 1.2 and today 1.6. He is not oliguric though UOP has dropped some over the past day. By in/out documentation he is net + 12 L since admission. UA today with 3 WBC, <1 RBC. Kidneys without hydro on CT this AM.    PAST MEDICAL HISTORY:  Past Medical History:   Diagnosis Date    Actinic keratosis     aldara    Anxiety 12/1997    Cancer (H)     squamous cell skin CA    Cauda equina spinal cord injury (H)     Chronic sinusitis 05/01/2016    Cirrhosis of liver (H) 04/01/2014    Not biopsy-proven as of 4/1/14.      Depressive disorder     Diastasis recti     Esophageal reflux     Esophageal varices in cirrhosis (H) 04/01/2014    Hospitalized for UGI blee 3/28/14, endoscopy revealed bleeding varices.    Essential hypertension, benign     Intermittent asthma     Mild depression     Mixed hyperlipidemia     Nasal polyps 05/01/2016    Osteoarthritis of lumbar spine, unspecified spinal osteoarthritis complication status     Other chronic pain     Paroxysmal atrial fibrillation (H) 09/22/2021    SCCA (squamous cell carcinoma) of skin     Seasonal allergic conjunctivitis     Type II or unspecified type diabetes mellitus without mention of complication, not stated as uncontrolled     Unspecified site of spinal cord injury without evidence of spinal bone  injury     due to back surgery       MEDICATIONS:  PTA Meds  Prior to Admission medications    Medication Sig Last Dose Taking? Auth Provider Long Term End Date   albuterol (PROAIR HFA/PROVENTIL HFA/VENTOLIN HFA) 108 (90 Base) MCG/ACT inhaler INHALE 2 PUFFS BY MOUTH EVERY 6 HOURS AS NEEDED FOR SHORTNESS OF BREATH OR WHEEZING  Patient taking differently: 2 puffs every 6 hours as needed. INHALE 2 PUFFS BY MOUTH EVERY 6 HOURS AS NEEDED FOR SHORTNESS OF BREATH OR WHEEZING   Wegener, Joel Daniel Irwin, MD Yes    apixaban ANTICOAGULANT (ELIQUIS ANTICOAGULANT) 5 MG tablet Take 1 tablet (5 mg) by mouth 2 times daily   Wegener, Joel Daniel Irwin, MD     aspirin 81 MG EC tablet Take 1 tablet (81 mg) by mouth daily   Bianca Riggs MD No    baclofen (LIORESAL) 10 MG tablet Take 2 tablets (20 mg) by mouth 3 times daily.   Wegener, Joel Daniel Irwin, MD     bisacodyl (DULCOLAX) 5 MG EC tablet Two days prior to exam take two (2) tablets at 4pm. One day prior to exam take two (2) tablets at 4pm   Juan Gupta MD     blood glucose monitoring (FREESTYLE FREEDOM LITE) meter device kit Use to test blood sugar 3 times daily or as directed.   Wegener, Joel Daniel Irwin, MD     Continuous Blood Gluc  (FREESTYLE CACHORRO 2 READER) GASTON USE TO READ BLOOD SUGARS AS PER 'S INSTRUCTIONS   Wegener, Joel Daniel Irwin, MD     doxazosin (CARDURA) 8 MG tablet Take 1 tablet (8 mg) by mouth at bedtime   Wegener, Joel Daniel Irwin, MD Yes    gabapentin (NEURONTIN) 300 MG capsule Take 600 mg ( two pills) by mouth in the afternoon and 900 mg (three pills)  at bedtime   Wegener, Joel Daniel Irwin, MD Yes    hydrOXYzine HCl (ATARAX) 25 MG tablet Take 1 tablet (25 mg) by mouth every 6 hours as needed for other (adjuvant pain)   Pk Doshi, APRN CNP     insulin glargine (LANTUS SOLOSTAR) 100 UNIT/ML pen INJECT 26 UNITS SUBCUTANEOUSLY AT BEDTIME  Patient taking differently: 26 Units at bedtime. INJECT 26 UNITS  SUBCUTANEOUSLY AT BEDTIME   Wegener, Joel Daniel Irwin, MD Yes    insulin pen needle (GLOBAL EASE INJECT PEN NEEDLES) 32G X 4 MM miscellaneous USE AS DIRECTED EVERY DAY   Wegener, Joel Daniel Irwin, MD     Lidocaine (LIDOCARE) 4 % Patch Place 1 patch onto the skin every 24 hours To prevent lidocaine toxicity, patient should be patch free for 12 hrs daily.  Patient not taking: Reported on 6/4/2024   Kurtis Nye MD     lidocaine (LMX4) 4 % external cream Apply topically once as needed for mild pain  Patient not taking: Reported on 6/4/2024   Kurtis Nye MD     medical cannabis (Patient's own supply) See Admin Instructions (The purpose of this order is to document that the patient reports taking medical cannabis.  This is not a prescription, and is not used to certify that the patient has a qualifying medical condition.)   Flower - PRN   Reported, Patient     methocarbamol (ROBAXIN) 500 MG tablet Take 1 tablet (500 mg) by mouth 4 times daily   Wegener, Joel Daniel Irwin, MD No    ondansetron (ZOFRAN) 4 MG tablet Take 1 tablet (4 mg) by mouth 2 times daily as needed for nausea.   Wegener, Joel Daniel Irwin, MD No    oxyCODONE (ROXICODONE) 5 MG tablet Take 1 tablet (5 mg) by mouth 5 times daily As needed for post op pain in addition to 10mg three times daily scheduled.   Wegener, Joel Daniel Irwin, MD     oxyCODONE IR (ROXICODONE) 10 MG tablet Take 1 tablet (10 mg) by mouth 3 times daily as needed for severe pain.   Wegener, Joel Daniel Irwin, MD     polyethylene glycol (GOLYTELY) 236 g suspension Two days before procedure at 5PM fill first container with water. Mix and drink an 8 oz glass every 15 minutes until HALF of the container is gone. Place the remainder in the refrigerator. One day before procedure at 5PM drink second half of bowel prep. Drink an 8 oz glass every 15 minutes until it is gone. Day of procedure 6 hours before arrival time fill the 2nd container with water. Mix and drink an 8 oz glass every 15  minutes until HALF of the container is gone. Discard the remaining solution.   Juan Gupta MD     promethazine (PHENERGAN) 25 MG tablet TAKE 1 TABLET BY MOUTH 3 TIMES A DAY AS NEEDED FOR NAUSEA   Wegener, Joel Daniel Irwin, MD No    propranolol (INDERAL) 40 MG tablet Take 1 tablet (40 mg) by mouth 3 times daily   Wegener, Joel Daniel Irwin, MD Yes    rosuvastatin (CRESTOR) 40 MG tablet Take 1 tablet (40 mg) by mouth daily   Wegener, Joel Daniel Irwin, MD Yes    sertraline (ZOLOFT) 100 MG tablet TAKE 2 TABLETS BY MOUTH DAILY   Wegener, Joel Daniel Irwin, MD Yes    triamterene-HCTZ (DYAZIDE) 37.5-25 MG capsule Take 1 capsule by mouth every morning   Wegener, Joel Daniel Irwin, MD Yes       Current Meds  Current Facility-Administered Medications   Medication Dose Route Frequency Provider Last Rate Last Admin    aspirin (ASA) chewable tablet 162 mg  162 mg Oral or Feeding Tube Daily Jovita Aleman APRN CNP   162 mg at 01/04/25 0801    baclofen (LIORESAL) tablet 10 mg  10 mg Oral or Feeding Tube Q8H Jovita Aleman APRN CNP   10 mg at 01/04/25 0756    chlorhexidine (PERIDEX) 0.12 % solution 15 mL  15 mL Mouth/Throat Q12H Stacey Torres MD   15 mL at 01/04/25 0756    heparin ANTICOAGULANT injection 5,000 Units  5,000 Units Subcutaneous Q8H Jovita Aleman APRN CNP   5,000 Units at 01/04/25 0802    lactated ringers BOLUS 500 mL  500 mL Intravenous Once Renata Rey MD        multivitamins w/minerals liquid 15 mL  15 mL Per Feeding Tube Daily Sean Negron MD   15 mL at 01/04/25 0802    pantoprazole (PROTONIX) 2 mg/mL suspension 40 mg  40 mg Per Feeding Tube QAM Stacey Cantu MD        Or    pantoprazole (PROTONIX) IV push injection 40 mg  40 mg Intravenous QAM Stacey Cantu MD   40 mg at 01/04/25 0802    piperacillin-tazobactam (ZOSYN) intermittent infusion 4.5 g  4.5 g Intravenous Q6H Maria Luz Lozada  mL/hr at 01/04/25 0802 4.5 g at 01/04/25 0802    polyethylene glycol (MIRALAX)  Packet 17 g  17 g Oral or Feeding Tube Daily Sonia Fajardo PA-C   17 g at 01/04/25 0802    potassium & sodium phosphates (NEUTRA-PHOS) Packet 2 packet  2 packet Oral or Feeding Tube Q4H Bernice Rosenbaum MD   2 packet at 01/04/25 1050    Prosource TF20 ENfit Compatibl EN LIQD (PROSOURCE TF20) packet 60 mL  1 packet Per Feeding Tube Daily Sean Negron MD   60 mL at 01/03/25 1143    rosuvastatin (CRESTOR) tablet 40 mg  40 mg Oral Daily Jovita Aleman APRN CNP   40 mg at 01/04/25 0800    senna-docusate (SENOKOT-S/PERICOLACE) 8.6-50 MG per tablet 1 tablet  1 tablet Oral or Feeding Tube BID Sonia Fajardo PA-C   1 tablet at 01/04/25 0802    sertraline (ZOLOFT) tablet 200 mg  200 mg Oral Daily Sean Negron MD   200 mg at 01/04/25 0800    sodium chloride (PF) 0.9% PF flush 3 mL  3 mL Intracatheter Q8H Sonia Fajardo PA-C   3 mL at 01/03/25 1143    [START ON 1/5/2025] vancomycin (VANCOCIN) 1,500 mg in 0.9% NaCl 250 mL intermittent infusion  1,500 mg Intravenous Q24H Bernice Rosenbaum MD         Infusion Meds  Current Facility-Administered Medications   Medication Dose Route Frequency Provider Last Rate Last Admin    dexmedeTOMIDine (PRECEDEX) 4 mcg/mL in sodium chloride 0.9 % 100 mL infusion  0.1-1.2 mcg/kg/hr (Dosing Weight) Intravenous Continuous Renata Rey MD        dextrose 10% infusion   Intravenous Continuous PRN Sean Negron MD        dextrose 10% infusion   Intravenous Continuous PRN Stacey Torres MD        DOBUTamine (DOBUTREX) 500 mg in D5W 250 mL infusion (adult std conc)  2.5 mcg/kg/min (Dosing Weight) Intravenous Continuous Renata Rey MD        fentaNYL (SUBLIMAZE) infusion   mcg/hr Intravenous Continuous Maria Luz Lozdaa MD 1 mL/hr at 01/04/25 1100 50 mcg/hr at 01/04/25 1100    insulin regular (MYXREDLIN) 1 unit/mL infusion  0-24 Units/hr Intravenous Continuous Stacey Torres MD 4 mL/hr at 01/04/25 1108 4 Units/hr at 01/04/25 1108    Reason  beta blocker order not selected   Does not apply DOES NOT GO TO Sonia Montalvo PA-C           ALLERGIES:    Allergies   Allergen Reactions    Levaquin Anaphylaxis and Rash     Swelling in lip and tongue felt thick    Lisinopril Anaphylaxis     Swollen tongue; inability to swallow; drooling; hives; swollen face, neck, angioedema    Acetaminophen      Hx of cirrhosis     Amlodipine      Swelling, hives, possible angioedema      Morphine      b/p dropped and arms went numb  Hypotension    Quinolones Hives    Spironolactone Unknown     Pt believes himself to be allergic to it; cannot find his old records of this.-mjc     Bactrim [Sulfamethoxazole-Trimethoprim] Rash       REVIEW OF SYSTEMS:  Unable to assess due to clinical condition    SOCIAL HISTORY:   Social History     Socioeconomic History    Marital status: Single     Spouse name: Not on file    Number of children: 0    Years of education: Not on file    Highest education level: Not on file   Occupational History    Occupation: sales     Employer: UNEMPLOYED   Tobacco Use    Smoking status: Former     Current packs/day: 0.00     Types: Cigarettes     Start date: 10/13/1983     Quit date: 1984     Years since quittin.6     Passive exposure: Past    Smokeless tobacco: Never   Vaping Use    Vaping status: Every Day    Substances: THC, CBD    Devices: Refillable tank   Substance and Sexual Activity    Alcohol use: Not Currently     Comment: - last drink was 3/28/14    Drug use: Yes     Frequency: 2.0 times per week     Types: Marijuana     Comment: medical marijuana - vape daily    Sexual activity: Not Currently     Partners: Female     Birth control/protection: Abstinence   Other Topics Concern    Parent/sibling w/ CABG, MI or angioplasty before 65F 55M? No   Social History Narrative    Not on file     Social Drivers of Health     Financial Resource Strain: Unknown (2025)    Financial Resource Strain     Within the past 12 months, have you or your  family members you live with been unable to get utilities (heat, electricity) when it was really needed?: Patient unable to answer   Food Insecurity: Unknown (1/2/2025)    Food Insecurity     Within the past 12 months, did you worry that your food would run out before you got money to buy more?: Patient unable to answer     Within the past 12 months, did the food you bought just not last and you didn t have money to get more?: Patient unable to answer   Transportation Needs: Unknown (1/2/2025)    Transportation Needs     Within the past 12 months, has lack of transportation kept you from medical appointments, getting your medicines, non-medical meetings or appointments, work, or from getting things that you need?: Patient unable to answer   Physical Activity: Inactive (12/31/2020)    Exercise Vital Sign     Days of Exercise per Week: 0 days     Minutes of Exercise per Session: 0 min   Stress: Not on file   Social Connections: Unknown (12/31/2020)    Social Connection and Isolation Panel [NHANES]     Frequency of Communication with Friends and Family: Not on file     Frequency of Social Gatherings with Friends and Family: Not on file     Attends Confucianist Services: Not on file     Active Member of Clubs or Organizations: Not on file     Attends Club or Organization Meetings: Not on file     Marital Status:    Interpersonal Safety: Unknown (1/2/2025)    Interpersonal Safety     Do you feel physically and emotionally safe where you currently live?: Patient unable to answer     Within the past 12 months, have you been hit, slapped, kicked or otherwise physically hurt by someone?: Patient unable to answer     Within the past 12 months, have you been humiliated or emotionally abused in other ways by your partner or ex-partner?: Patient unable to answer   Housing Stability: Unknown (1/2/2025)    Housing Stability     Do you have housing? : Patient unable to answer     Are you worried about losing your housing?:  Patient unable to answer       FAMILY MEDICAL HISTORY:   Family History   Problem Relation Age of Onset    Cancer Mother         lung    Breast Cancer Mother     Other Cancer Mother     Cancer Father         esophogeal, GERD    Snoring Father     Diabetes Brother         amputation, Type 1    Diabetes Brother     Diabetes Brother     Cancer - colorectal No family hx of     Glaucoma No family hx of     Macular Degeneration No family hx of     Anesthesia Reaction No family hx of     Venous thrombosis No family hx of        PHYSICAL EXAM:   Temp  Av.1  F (37.3  C)  Min: 97.7  F (36.5  C)  Max: 102.6  F (39.2  C)  Arterial Line BP  Min: 65/60  Max: 224/75  Arterial Line MAP (mmHg)  Av.6 mmHg  Min: 54 mmHg  Max: 131 mmHg      Pulse  Av.7  Min: 51  Max: 165 Resp  Av  Min: 6  Max: 46  FiO2 (%)  Av.9 %  Min: 30 %  Max: 90 %  SpO2  Av.6 %  Min: 74 %  Max: 100 %    CVP (mmHg): 11 mmHg  Date 25 0700 - 25 0659   Shift 4837-6290 2411-1321 0197-1693 24 Hour Total   INTAKE   I.V. 410.95   410.95   NG/   230   Enteral 100   100   Shift Total(mL/kg) 740.95(8.66)   740.95(8.66)   OUTPUT   Urine 200   200   Chest Tube(mL/kg) 40(0.47)   40(0.47)   Shift Total(mL/kg) 240(2.8)   240(2.8)   Weight (kg) 85.6 85.6 85.6 85.6      Admit Weight: 76 kg (167 lb 8.8 oz)     GEN: intubated and sedated, numerous support devices in place  HEENT: +ETT  CV: RRR, no m/r/g heard.     RESP: mechanical breath sounds, symmetric chest expansion   ABD: soft, ND  : ac  MSK: no gross joint deformities  SKIN: warm, dry, no rashes or lesions on exposed skin  NEURO: sedated     LABS:   CMP  Recent Labs   Lab 25  1108 25  1016 25  0859 25  0825 25  0705 25  0403 25  0258 25  0216 25  1807 25  1612 25  0340 25  0336 25  2155 25  2024 25  1940 25  1722 25  0537 25  0333   NA  --   --   --   --   --  147*  --   145  --  147*  --  147*  147*   < > 147*  --  149*   < > 140   POTASSIUM  --   --   --   --   --  3.2*  --  3.5  --  3.9  --  4.4  4.4   < > 4.3  --  4.6   < > 4.1   CHLORIDE  --   --   --   --   --  111*  --  109*  --  110*  --  112*  112*   < > 111*  --  110*  --  109*   CO2  --   --   --   --   --  22  --  20*  --  24  --  28  28   < > 24  --  20*  --  22   ANIONGAP  --   --   --   --   --  14  --  16*  --  13  --  7  7   < > 12  --  19*  --  9   * 166* 210* 174*   < > 155*  168*   < > 173*   < > 145*   < > 145*  145*   < > 204*   < > 150*  164*   < > 127*  128*   BUN  --   --   --   --   --  40.0*  --  37.8*  --  26.3*  --  15.9  15.9   < > 13.7  --  11.7  --  8.3   CR  --   --   --   --   --  1.59*  --  1.57*  --  1.21*  --  0.95  0.95   < > 0.96  --  1.02  --  0.68   GFRESTIMATED  --   --   --   --   --  48*  --  49*  --  66  --  89  89   < > 88  --  82  --  >90   AFRICA  --   --   --   --   --  7.9*  --  8.1*  --  8.4*  --  8.5*  8.5*   < > 9.0  --  9.3  --  8.1*   MAG  --   --   --   --   --  2.2  --   --   --   --   --  3.3*  --  2.4*  --  2.8*  --  2.3   PHOS  --   --   --   --   --  1.5*  --   --   --   --   --  2.9  --  2.4*  --   --   --  2.6   PROTTOTAL  --   --   --   --   --   --   --  4.1*  --   --   --  4.1*  --  3.9*  --  3.2*  --  5.3*   ALBUMIN  --   --   --   --   --   --   --  2.4*  --   --   --  2.4*  --  2.3*  --  2.0*  --  3.1*   BILITOTAL  --   --   --   --   --   --   --  0.7  --   --   --  0.5  --  0.9  --  0.8  --  0.3   ALKPHOS  --   --   --   --   --   --   --  66  --   --   --  54  --  59  --  52  --  82   AST  --   --   --   --   --   --   --  472*  --   --   --  165*  --  175*  --  112*  --  67*   ALT  --   --   --   --   --   --   --  213*  --   --   --  65  --  62  --  33  --  18    < > = values in this interval not displayed.     CBC  Recent Labs   Lab 01/04/25  0216 01/03/25  1612 01/03/25  0907 01/03/25  0336   HGB 7.3* 8.5* 8.0* 9.2*   WBC 21.0* 24.0* 20.9*  14.0*   RBC 2.43* 2.90* 2.75* 3.12*   HCT 21.0* 24.8* 24.0* 26.3*   MCV 86 86 87 84   MCH 30.0 29.3 29.1 29.5   MCHC 34.8 34.3 33.3 35.0   RDW 16.0* 15.9* 15.7* 15.7*   * 149* 123* 97*     INR  Recent Labs   Lab 01/03/25  1124 01/03/25  0336 01/02/25  2155 01/02/25  2024 01/02/25  1724   INR  --  1.29* 1.42* 1.43* 1.59*   PTT 34 35 39* 47* 71*     ABG  Recent Labs   Lab 01/04/25  1023 01/04/25  1005 01/04/25  0855 01/04/25  0853 01/04/25  0707 01/04/25  0404   PH 7.57*  --  7.59*  --  7.59* 7.62*   PCO2 29*  --  26*  --  27* 23*   PO2 73*  --  78*  --  59* 57*   HCO3 26  --  25  --  26 24   O2PER 30 30 40 40 50 40      URINE STUDIES  Recent Labs   Lab Test 01/04/25  0842 12/31/24  1114   COLOR Yellow Light Yellow   APPEARANCE Clear Clear   URINEGLC Negative 70*   URINEBILI Negative Negative   URINEKETONE Trace* 20*   SG 1.029 1.015   UBLD Small* Small*   URINEPH 5.5 6.0   PROTEIN 20* 50*   NITRITE Negative Negative   LEUKEST Negative Negative   RBCU <1 9*   WBCU 3 4     No lab results found.  PTH  No lab results found.  IRON STUDIES  No lab results found.    IMAGING:  Results for orders placed or performed during the hospital encounter of 12/31/24   XR Chest 2 Views    Impression    IMPRESSION:   Diffuse hazy appearance of the lungs without focal consolidative  opacity. Findings are nonspecific, may represent atypical infection,  inflammation, or edema.    I have personally reviewed the examination and initial interpretation  and I agree with the findings.    AYLEEN WATSON MD         SYSTEM ID:  [id number]   XR Chest Port 1 View    Impression    IMPRESSION: Support devices placed as described. Mild perihilar  edema/atelectasis.    AYLEEN WATSON MD         SYSTEM ID:  [id number]   XR Chest Port 1 View    Impression    Impression:   1. Stable projection of support devices.  2. Patchy perihilar opacities, favors atelectasis.     I have personally reviewed the examination and initial interpretation  and I  agree with the findings.    AMY LAU DO         SYSTEM ID:  [id number]   CT Head w/o Contrast    Impression    Impression: No acute intracranial hemorrhage.     JENNI FAUST MD         SYSTEM ID:  [id number]   CT Chest Abdomen Pelvis w/o Contrast    Impression    IMPRESSION:  1. Acute nondisplaced fractures of the left sixth and seventh  costochondral junctions with a 9.2 x 2.6 cm retrosternal hematoma.  Limited evaluation for active extravasation on this noncontrast exam.  2. Bilateral anterior rib deformities compatible with known history of  chest compressions.  3. Trace right pleural effusion.    I have personally reviewed the examination and initial interpretation  and I agree with the findings.    MAGGIE PAULINO MD         SYSTEM ID:  [id number]   US Lower Extremity Arterial Duplex Bilateral    Impression    Impression:     1. Right leg:  ECMO cannulae limiting evaluation of the common  femoral, deep femoral and upper superficial femoral arteries. Right  below-knee amputation. Patent right superficial femoral artery in the  distal thigh and patent right popliteal artery, both of these arteries  show monophasic dampened waveform.    2. Left leg: Catheter limiting evaluation of the left groin arteries.   Patent left lower extremity arterial vasculature, with monophasic  waveforms.    WES JAMES MD         SYSTEM ID:  [id number]    Carotid Bilateral    Impression    Impression:    1. Right side:        Degree of stenosis of the internal carotid artery: Less than 50 %.  .    2. Left side:         Degree of stenosis of the internal carotid artery: Less than 50 %.  .    Intersocietal Accreditation Commission Updated Recommendations for  Carotid Stenosis Interpretation Criteria - October 2021.  https://intersocietal.org/wp-content/uploads/2021/10/IAC-Vascular-Test  xg-Tjkwqwojcvlbe_Rmxycdk-Ivztmtrjnqcuxcy-for-Carotid-Stenosis-Interpre  ation-Criteria.pdf    Society for Vascular Surgery Clinical Practice  Guidelines for  Management of Extracranial Cerebrovascular Disease. Journal of  Vascular Surgery Vol. 75, Issue 1, Supplement [id number] Published  online: June 18, 2021 (additional criteria adjustment for >70% ICA  stenosis)         Normal            ICA PSV: < 180 cm/s            Plaque: None            ICA/CCA PSV Ratio: < 2.0            ICA EDV: < 40 cm/s         < 50%            ICA PSV: < 180 cm/s            Plaque: < 50%            ICA/CCA PSV Ratio: < 2.0            ICA EDV: < 40 cm/s         50 - 69%            ICA PSV: < 180 - 230 cm/s            ICA/CCA PSV Ratio: 2.0 - 4.0            ICA EDV: 40 - 100 cm/s         > 70%            ICA PSV: > 230 cm/s AND             ICA/CCA PSV Ratio: > 4.0 or ICA EDV: > 100 cm/s         Total occlusion            ICA PSV: Undetectable            ICA/CCA PSV Ratio: N/A            ICA EDV: N/A    I have personally reviewed the examination and initial interpretation  and I agree with the findings.    WES JAMES MD         SYSTEM ID:  [id number]   US Lower Extremity Venous Mapping Bilateral    Impression    Impression:   1. Venous mapping measurements as per above.  2. No thrombus identified in either extremity.    I have personally reviewed the examination and initial interpretation  and I agree with the findings.    WES JAMES MD         SYSTEM ID:  [id number]   XR Abdomen Port 1 View    Impression    Impression:   No gastric tube in the field-of-view.    MARYLOU LINDO MD         SYSTEM ID:  [id number]   XR Abdomen Port 1 View    Impression    Impression:   The gastric tube tip and sidehole project over the stomach, which now  appears decompressed.    MARYLOU LINDO MD         SYSTEM ID:  [id number]   XR Chest Port 1 View    Impression    Impression:   1. Stable projection of support devices.  2. Perihilar and bibasilar patchy opacities, favors atelectasis.     I have personally reviewed the examination and initial interpretation  and I agree with the  findings.    LUIS MAIN MD         SYSTEM ID:  [id number]   XR Chest Port 1 View    Impression    IMPRESSION:   1. Changes of cardiothoracic surgery with multiple new support devices  as described above, all of which appear appropriately positioned.  2. Left lung segmental atelectasis. No pleural effusion or  pneumothorax.    I have personally reviewed the examination and initial interpretation  and I agree with the findings.    ABIGAIL WELLER MD         SYSTEM ID:  [id number]   XR Abdomen Port 1 View    Impression    Impression: OG tube has been advanced from same day chest radiograph  with tip and sidehole now overlying the stomach. Multiple additional  support devices as above. Please see same day chest radiograph.    ABIGAIL WELLER MD         SYSTEM ID:  [id number]   XR Chest Port 1 View    Impression    Impression:   1. Stable projection of support devices and surgical hardware.  2. No focal pneumonia, pneumothorax, or pleural effusion.    I have personally reviewed the examination and initial interpretation  and I agree with the findings.    LUIS MAIN MD         SYSTEM ID:  [id number]   CT Head w/o Contrast    Impression    Impression:  1. Minimal subdural hemorrhage along the tentorium cerebelli.  2. Mild leukoaraiosis.    I have personally reviewed the examination and initial interpretation  and I agree with the findings.    ANDERSON VÁZQUEZ MD         SYSTEM ID:  [id number]   CT Chest Abdomen Pelvis w/o Contrast   Result Value Ref Range    Radiologist flags Large right-sided pneumothorax, presumably new (Urgent)     Impression    IMPRESSION:   1.  New large right pneumothorax with collapse of the right lung  parenchyma and mild shifting of the mediastinum to the left. New  compared to 1/3/2025 chest x-ray.  2.  Postoperative changes of coronary artery bypass. Sternotomy wires  are intact. Mediastinal drains and left basilar chest tube are in  position. Small amount of residual air and fluid along the  anterior  mediastinum with subcutaneous air extending into the neck. Presumably  these findings are iatrogenic. Attention on follow-up.  3.  Right small pleural effusion.  4.  Small volume of abdominopelvic ascites.  5.  Other incidental findings as noted in the body of the report.    [Urgent Result: Large right-sided pneumothorax, presumably new  compared to prior CT and radiographs, with slight shifting of the  mediastinum to the left.]    Finding was identified on 1/4/2025 5:54 AM.     Lio Persaud RN was contacted by Dr. Victor M Fine at 1/4/2025 6:03  AM and verbalized understanding of the urgent finding.     I have personally reviewed the examination and initial interpretation  and I agree with the findings.    ABIGAIL WELLER MD         SYSTEM ID:  [id number]   XR Chest Port 1 View    Impression    IMPRESSION:   1. Interval placement of a right apically directed chest tube with  trace residual right pneumothorax.  2. Stable support devices as detailed above.    I have personally reviewed the examination and initial interpretation  and I agree with the findings.    ABIGAIL WELLER MD         SYSTEM ID:  [id number]   Echocardiogram Complete   Result Value Ref Range    LVEF  10-15% (severely reduced)    Echo Complete   Result Value Ref Range    LVEF  20-25% (severely reduced)      Jada Herr DO, [employer]   [facility]  Division of Nephrology and Hypertension  Pager [phone]    I spent 55 minutes on this date of encounter reviewing chart, evaluating patient, formulating plan of care, and documenting.

## 2025-01-04 NOTE — PLAN OF CARE
Major Shift Events:  Assumed care at 1400.    Neuro: Sedation off. Will awaken spontaneously, open eyes and look up, flex extremities particularly left arm, become tachypnic up to RR 30's and hypertensive. Does not follow commands or track with eyes. PERRL, gaze is forwards when calm. Bites down on ETT and swabs. No clear cough with suctioning but appears to gag on ETT when awake. Tmax bladder 101.8, however axillary 100.8. Team notified.  CV: SR 80-90's. Rare ectopy. A wires connected pacer rate 80. No pacing noted. V wires in place, disconnected. MAP goal > 65, SBP < 140. On and off levophed to meet MAP goal. Hypertension resolving after pt calms down, no PRN hydralazine administered. 1600 NADEEN numbers CVP 9, PA 40/16, SvO2 50%, CI 2.4, . Lactic acid 4. Team notified.  Three chest tubes to suction, intermittent air leak. About 10-20 mL/hr total output.  Pulm: Vent CMV 30%/14/480/8. RR 20's. ABG at 1600 PaO2 66, incrased FiO2 to 40%, PaO2 82. O2 sats high 90's. Scant cloudy secretions suctioned from ETT. Small amount of thick pink secretions suctioned from mouth.  GI: OG with TF a 10 mL/hr, SFWF. Goal 20, next increase at 2200. No BM yet, bowel regimen ordered. Insulin gtt titrated to meet BG goal 100-150.  : Rodriguez intact with UO ~20 mL/hr cory urine. Team updated.    Plan: Continue to monitor neuro status. Pressure support trial and possible extubation when indicated.    For vital signs and complete assessments, please see documentation flowsheets.       Goal Outcome Evaluation:      Plan of Care Reviewed With: patient    Overall Patient Progress: improvingOverall Patient Progress: improving    Outcome Evaluation: Decannulated from ECMO. Off sedation. Requiring minimal/no blood pressure support to meet goals.

## 2025-01-04 NOTE — PHARMACY-VANCOMYCIN DOSING SERVICE
"Pharmacy Vancomycin Initial Note  Date of Service January 3, 2025  Patient's  1960  64 year old, male    Indication: Aspiration Pneumonia    Current estimated CrCl = Estimated Creatinine Clearance: 73.7 mL/min (A) (based on SCr of 1.21 mg/dL (H)).    Creatinine for last 3 days  2025:  4:17 AM Creatinine 0.62 mg/dL; 11:55 AM Creatinine 0.66 mg/dL;  4:07 PM Creatinine 0.70 mg/dL;  9:28 PM Creatinine 0.70 mg/dL  2025:  3:33 AM Creatinine 0.68 mg/dL;  5:22 PM Creatinine 1.02 mg/dL;  8:24 PM Creatinine 0.96 mg/dL;  9:55 PM Creatinine 1.00 mg/dL  1/3/2025:  3:36 AM Creatinine 0.95 mg/dL;  3:36 AM Creatinine 0.95 mg/dL;  4:12 PM Creatinine 1.21 mg/dL    Recent Vancomycin Level(s) for last 3 days  No results found for requested labs within last 3 days.      Vancomycin IV Administrations (past 72 hours)                     vancomycin (VANCOCIN) 1,000 mg in 200 mL dextrose intermittent infusion (mg) 1,000 mg New Bag 25 2224                    Nephrotoxins and other renal medications (From now, onward)      Start     Dose/Rate Route Frequency Ordered Stop    25 0100  vancomycin (VANCOCIN) 1,500 mg in 0.9% NaCl 250 mL intermittent infusion         1,500 mg  166.7 mL/hr over 90 Minutes Intravenous EVERY 24 HOURS 25 2349      25 0100  Vancomycin (VANCOCIN) 2,000 mg in 0.9% NaCl 500 mL intermittent infusion         2,000 mg  250 mL/hr over 2 Hours Intravenous ONCE 25 2349      25 0030  piperacillin-tazobactam (ZOSYN) intermittent infusion 4.5 g        Note to Pharmacy: For [facility], [facility] and [facility]: For Zosyn-naive patients, use the \"Zosyn initial dose + extended infusion\" order panel.    4.5 g  200 mL/hr over 30 Minutes Intravenous EVERY 6 HOURS 25 2337      25 1400  norepinephrine (LEVOPHED) 16 mg in  mL infusion MAX CONC CENTRAL LINE         0.01-0.6 mcg/kg/min × 76 kg (Dosing Weight)  0.7-42.8 mL/hr  Intravenous CONTINUOUS 25 1335              Contrast Orders " - past 72 hours (72h ago, onward)      None            [employer] Prediction of Planned Initial Vancomycin Regimen  Loading dose: 2000mg IV once now  Regimen: 1500 mg IV every 24 hours.  Start time: 22:24 on 01/04/2025  Exposure target: AUC24 (range)400-600 mg/L.hr   AUC24,ss: 477 mg/L.hr  Probability of AUC24 > 400: 69 %  Ctrough,ss: 13.6 mg/L  Probability of Ctrough,ss > 20: 20 %  Probability of nephrotoxicity (Lodise MYRAIM 2009): 9 %    Plan:  Start vancomycin  2000 mg IV once now.  Followed by vancomycin 1500mg q24h   Vancomycin monitoring method: AUC  Vancomycin therapeutic monitoring goal: 400-600 mg*h/L  Pharmacy will check vancomycin levels as appropriate in 1-3 Days.    Serum creatinine levels will be ordered daily for the first week of therapy and at least twice weekly for subsequent weeks.      Arpan Schmidt, [facility]  [id number]

## 2025-01-04 NOTE — PROGRESS NOTES
[facility]    ICU Progress Note       Date of Admission:  12/31/2024    Assessment: Critical Care   Erwin Aguilar is a 64 year old male with a past medical history of DM2, HTN, PVD, and neuropathic pain who initially presented on 12/31/24 to the ED with arm pain, vomiting, and diarrhea; ACS and ST depression. He subsequently had VT arrest with 1 round of CPR, epinephrine, defibrillation. After ROSC had afib with RVR. Became hypotensive and was subsequently cannulated for VA ECMO on 1/2/25, s/p CABG x4, and arrived to the ICU at that time. He was de cannulated on 1/3/25 at bedside. ICU course complicated by large R ptx seen on chest CT on 1/4/25, CVTS Fellow placed chest tube at bedside with improvement of pneumothorax.       MAJOR CHANGES/UPDATES  - ON, pt had new acute onset of anisocoria, so CTH obtained which was negative for acute intracranial pathology  - He also had elevated lactate peaked at 6.4 decreasing UOP of 10-20 ml/hr, and CI of 2.1, so was started on dobutamine by Dr. Rosenbaum. Will also give pt 500 ml of LR to see if pt's UOP improves   - CT C/A/P ordered for persistently elevated lactate which revealed large tension R pneumothorax. CVTS notified and fellow placed chest tube at bedside this morning with improvement of pneumothorax. Repeat lactate now down to 2.0   - He was also febrile up to 102.6 F, so abx was broadened to vanc/Zosysn, and pan-cultured  - Now that R tension pneumothorax resolved, pt's now off of all pressors except for dobutamine  - Pt's in NSR with rate in the mid 80s, decreased back up rate on epicardial pacer wires to 70  - K+ down to 3.2, plan to replace, recheck this afternoon   - Pt has had constipation, increased bowel reg, with plan to give suppository this afternoon after AM bowel reg meds  - Will plan to switch from propofol to Precedex gtt for sedation       Plan: Critical Care   PLAN:  Neuro/ pain/ sedation:  # Acute  postoperative pain  # Chronic pain with opioid dependence  # Neuropathy, PTA  # Sedation while on mechanical ventilation   Analgesia   - Gtt fentanyl with NO PRN boluses- wean as able with PRN po meds   - Scheduled: baclofen    - PRN: oxycodone  Sedation    - Gtt: propofol plan to transition to Precedex gtt, titrate to RASS goal   RASS goal 0 to -1   SAT today     # Generalized Anxiety, PTA   - Continue PTA Sertraline  - Hold PTA Hydroxyzine     # Encephalopathy of unclear etiology   - Prior to procedure  - 1/1 vEEG without seizure activity (severe encephalopathy) and 12/31 and 1/4 CTH negative for acute findings.     Pulmonary care:   # Acute Hypoxic Respiratory failure   # Respiratory alkalosis   # R tension pneumothorax   # Aspiration Pneumonia, Klebsiella  FiO2 (%): 30 %, Resp: 30, Vent Mode: CMV/AC, Resp Rate (Set): 14 breaths/min, Tidal Volume (Set, mL): 420 mL, PEEP (cm H2O): 8 cmH2O, Resp Rate (Set): 14 breaths/min, Tidal Volume (Set, mL): 420 mL, PEEP (cm H2O): 8 cmH2O   - ON of 1/4 CT C/A/P ordered for persistently elevated lactate which revealed large R tension pneumothorax. CVTS notified and fellow placed chest tube at bedside this morning with improvement of pneumothorax. Repeat lactate now down to 2.0   - Wean vent as able; adjusted TV to 420 ml   - VAP bundle while intubated  - Goal SpO2 > 92%  - CXR on arrival, then daily   - Post chest tube CXR, ETT appropriately positioned, no pneumothorax appreciated  - Monitor CT output        Cardiovascular:    # Mixed Shock, cardiogenic, vasoplegic  # HTN, PTA   # VT Cardiac Arrest on VA ECMO (01/01/24 - 1/3)  # CAD s/p CABG x4  # Dyslipidemia, PTA   # Cardiomyopathy   # Elevated lactate- improving   1/2/25 Echo 30-35%, mild MR, RV   - ON 1/4 pt had elevated lactate peaking at 6.4, decreasing UOP to 10-20 ml/hr, and CI, so was started on dobutamine by Dr. Rosenbaum. Pt also had increasing pressors needs likely secondary to R pneumothorax, post chest-tube placement  lactate down to 2.0.    - Monitor hemodynamics closely. Goal MAP >65, SBP <140   -  Epi, , NE, and dobutamine gtts for inotropic and pressor support, wean as able  - Aspirin 81mg daily   - Crestor 40mg daily  - A/V wires back up rate decreased to 70 bpm with pt's intrinsic rate in mid 80s.   - Hold PTA apixaban, Dyazide, propanolol   - Plan to reassess cardiac function post ECMO decan and recent R tension ptx with TTE tomorrow     GI care / Nutrition:   # At risk for protein calorie malnutrition   # Constipation   - Diet: TF @ goal via OG   - PPI  - Last BM: PTA. Increased bowel reg and added milk of mag and PRN suppository      Renal / Fluids / Electrolytes:   # Acute Kidney Injury 2/2 ATN  # Oliguria   # Hypokalemia   # Elevated lactate- improving   BL creat appears to be ~ 0.6-0.8  - Cr this morning up to 1.59, UOP significantly down to 10-20 ml/hr, plan to give 500 ml LR bolus to see if UOP picks up if not consider Nephrology consult   - Strict I/O, daily weights, avoid/limit nephrotoxins  - Pt's K+ this morning 3.2, replaced, recheck this afternoon   - Replete lytes PRN per protocol        Endocrine:    # Stress hyperglycemia  # Type 2 Diabetes Mellitus, PTA   - Preop A1c 5.7  - Insulin gtt  - Goal BG <180 for optimal healing  - Hold PTA Lantus     ID / Antibiotics:  # Stress induced leukocytosis  # Aspiration Pneumonia, Klebsiella  - ON, he was also febrile up to 102.6 F, so abx was broadened to vanc/Zosysn, and pan-cultured  - Broadened abx from CTX (12/31 to 1/4) to vanc/zosyn 1/4/24 EOT TBD   - Monitor fever curve, WBC, and inflammatory markers as appropriate     Cultures  1/4 UA non-infectious appearing negative LE, nitrites, WBCs, and bacteria   1/4 Sputum cx gram stain 1+ gram negative bacilli   1/3 blood cx NGTD @ 12 hours   12/31 sputum: Klebsiella pneumonia with resistance to ampicillin   12/31 MRSA and blood cultures negative     Heme:     # Acute blood loss anemia  # Acute thrombocytopenia  No  s/sx active bleeding  - Trending CBC   - Hgb goal > 7.0, transfuse PRN   - DVT ppx: SQH      MSK / Skin:  # Sternotomy   # Surgical Incision  - Sternal precautions, postop incision management protocol  - PT/OT/CR    #PVD, PTA   - S/p right BKA, 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 All questions were answered. They are in agreement with plan.      Patient seen, findings and plan discussed with CVICU staff and CVTS staff.     Total Critical Care time spent by me, excluding procedures, was 60 minutes.        Denny James PA-C  [facility]  Securely message with Vocera (more info)  Text page via [employer] Paging/Directory     Clinically Significant Risk Factors        # Hypokalemia: Lowest K = 3.2 mmol/L in last 2 days, will replace as needed  # Hypernatremia: Highest Na = 149 mmol/L in last 2 days, will monitor as appropriate  # Hyperchloremia: Highest Cl = 112 mmol/L in last 2 days, will monitor as appropriate      # Hypocalcemia: Lowest iCa = 4.3 mg/dL in last 2 days, will monitor and replace as appropriate  # Hypercalcemia: Highest iCa = 5.3 mg/dL in last 2 days, will monitor as appropriate   # Anion Gap Metabolic Acidosis: Highest Anion Gap = 19 mmol/L in last 2 days, will monitor and treat as appropriate  # Hypoalbuminemia: Lowest albumin = 2 g/dL at 1/2/2025  5:22 PM, will monitor as appropriate    # Coagulation Defect: INR = 1.29 (Ref range: 0.85 - 1.15) and/or PTT = 34 Seconds (Ref range: 22 - 38 Seconds), will monitor for bleeding  # Thrombocytopenia: Lowest platelets = 84 in last 2 days, will monitor for bleeding  # Acute Kidney Injury, unspecified: based on a >150%  "or 0.3 mg/dL increase in last creatinine compared to past 90 day average, will monitor renal function  # Hypertension: Noted on problem list  # Chronic heart failure with reduced ejection fraction: last echo with EF <40%    # Acute Hypoxic Respiratory Failure: Documented O2 saturation < 90%. Continue supplemental oxygen as needed  # Acute Hypercapnic Respiratory Failure: based on arterial blood gas results.  Continue supplemental oxygen and ventilatory support as indicated.  # Acute Hypercapnic Respiratory Failure: based on venous blood gas results.  Continue supplemental oxygen and ventilatory support as indicated.         # Overweight: Estimated body mass index is 26.32 kg/m  as calculated from the following:    Height as of this encounter: 1.803 m (5' 11\").    Weight as of this encounter: 85.6 kg (188 lb 11.4 oz).      # Asthma: noted on problem list    # History of CABG: noted on surgical history       ____________________________________________________________________    Interval History   Unable to obtain due to pt's critical condition     Intake/Output  I/O last 3 completed shifts:  In: 5016.53 [I.V.:1116.53; NG/GT:700; IV Piggyback:2500]  Out: 1176 [Urine:705; Chest Tube:471]      Physical Exam   Vital Signs: Temp: (!) 101.6  F (38.7  C) Temp src: Oral BP: (!) 228/74 Pulse: 82   Resp: 30 SpO2: 100 % O2 Device: Mechanical Ventilator    Weight: 188 lbs 11.42 oz    Neuro: Intubated. Sedated. Eyes open intermittently doesn't track. All extremities move spontaneously. Doesn't follow commands. NAD.   HEENT: Normocephalic, atraumatic. PERRL, and nonicteric.   CV: RRR on monitor, S1S2, all extremities well perfused   Respiratory: Diminished breath sounds at lung bases on the R on auscultation. Tachypneic in the 30s on CMV RR 14, , PEEP 8 , FiO2 80 %. Equal chest rise b/l. Mediastinal chest tubes with 310 ml of serosanguinous output/24 hours without airleak noted.   GI: Abdomen soft. Non-distended.   : " Voiding with Rodriguez with dark, concentrated urine in Rodriguez bag.   MSK: Moves all extremities well with normal appearing strength. Sensation intact in all upper/lower extremities.     Skin: Sternal incision with dressings in place clean/dry/intact.  Normal color. No rashes or skin lesions.       Data   I reviewed all medications, new labs and imaging results over the last 24 hours.  Arterial Blood Gases   Recent Labs   Lab 01/04/25  1023 01/04/25  0855 01/04/25  0707 01/04/25  0404   PH 7.57* 7.59* 7.59* 7.62*   PCO2 29* 26* 27* 23*   PO2 73* 78* 59* 57*   HCO3 26 25 26 24       Complete Blood Count   Recent Labs   Lab 01/04/25  0216 01/03/25  1612 01/03/25  0907 01/03/25  0336   WBC 21.0* 24.0* 20.9* 14.0*   HGB 7.3* 8.5* 8.0* 9.2*   * 149* 123* 97*       Basic Metabolic Panel  Recent Labs   Lab 01/04/25  1538 01/04/25  1400 01/04/25  1208 01/04/25  1205 01/04/25  1108 01/04/25  0705 01/04/25  0403 01/04/25  0258 01/04/25  0216 01/03/25  1807 01/03/25  1612 01/03/25  0340 01/03/25  0336   NA  --   --   --   --   --   --  147*  --  145  --  147*  --  147*  147*   POTASSIUM  --   --   --  3.8  --   --  3.2*  --  3.5  --  3.9  --  4.4  4.4   CHLORIDE  --   --   --   --   --   --  111*  --  109*  --  110*  --  112*  112*   CO2  --   --   --   --   --   --  22  --  20*  --  24  --  28  28   BUN  --   --   --   --   --   --  40.0*  --  37.8*  --  26.3*  --  15.9  15.9   CR  --   --   --   --   --   --  1.59*  --  1.57*  --  1.21*  --  0.95  0.95   * 158* 167*  --  170*   < > 155*  168*   < > 173*   < > 145*   < > 145*  145*    < > = values in this interval not displayed.       Liver Function Tests  Recent Labs   Lab 01/04/25  0216 01/03/25  0336 01/02/25  2155 01/02/25 2024 01/02/25  1724 01/02/25  1722   * 165*  --  175*  --  112*   * 65  --  62  --  33   ALKPHOS 66 54  --  59  --  52   BILITOTAL 0.7 0.5  --  0.9  --  0.8   ALBUMIN 2.4* 2.4*  --  2.3*  --  2.0*   INR  --  1.29* 1.42*  1.43* 1.59*  --        Pancreatic Enzymes  Recent Labs   Lab 12/31/24  0752   LIPASE 10*       Coagulation Profile  Recent Labs   Lab 01/03/25  1124 01/03/25  0336 01/02/25  2155 01/02/25 2024 01/02/25  1724   INR  --  1.29* 1.42* 1.43* 1.59*   PTT 34 35 39* 47* 71*       IMAGING:  Recent Results (from the past 24 hours)   CT Head w/o Contrast    Narrative    CT HEAD W/O CONTRAST 1/3/2025 10:34 PM    History: HTN with new unequal pupils   ICD-10:    Comparison: 12/31/2024    Technique: Using multidetector thin collimation helical acquisition  technique, axial, coronal and sagittal CT images from the skull base  to the vertex were obtained without intravenous contrast.   (topogram) image(s) also obtained and reviewed.    Findings: Minimal subdural hemorrhage along the tentorium cerebelli.  There is no intraparenchymal hemorrhage, mass effect, or midline  shift. Mild generalized parenchymal volume loss. Patchy  periventricular hypoattenuation, likely related to small vessel  ischemic disease. Gray/white matter differentiation in both cerebral  hemispheres is preserved. Ventricles are proportionate to the cerebral  sulci. The basal cisterns are clear.    The bony calvaria and the bones of the skull base are normal. Polypoid  mucosal thickening of the left maxillary sinus. The mastoid air cells  are clear.      Impression    Impression:  1. Minimal subdural hemorrhage along the tentorium cerebelli.  2. Mild leukoaraiosis.    I have personally reviewed the examination and initial interpretation  and I agree with the findings.    ANDERSON VÁZQUEZ MD         SYSTEM ID:  [id number]   CT Chest Abdomen Pelvis w/o Contrast   Result Value    Radiologist flags Large right-sided pneumothorax, presumably new (Urgent)    Narrative    EXAM: Chest/abdomen/pelvis CT without contrast 1/4/2025 5:49 AM    HISTORY: 64 years Male POD2 CABG, POD1 ecmo decan, evaluate for source  of lactic acidosis.    COMPARISON: CT  12/31/2024.    TECHNIQUE: Helical CT images from the thoracic inlet through the  symphysis pubis were obtained without IV contrast.    FINDINGS:  Limited evaluation secondary to lack of intravenous contrast.     image(s) are noncontributory.    LINES/TUBES: Endotracheal tube terminates in the mid thoracic trachea.  Gastric tube terminates within the stomach. Rodriguez catheter balloon and  tip are positioned within the urinary bladder. Right IJ [place]-Bala  catheter with tip terminating in the main pulmonary artery. Left  basilar chest tube. Mediastinal drains terminate along the anterior  superior mediastinum.    CHEST:  LOWER NECK: Unremarkable thyroid. Subcutaneous emphysema, presumed  iatrogenic secondary to recent CABG procedure.    PULMONARY: Large right-sided pneumothorax with associated compressive  atelectasis of the right lung parenchyma. Small right-sided pleural  effusion. Scattered pulmonary granulomas within the left lung. No  focal pulmonary consolidative opacity within the left lung. Central  airways are clear. No suspicious pleural based mass or calcification.  Mild leftward shift of the mediastinum.    CARDIAC: No cardiomegaly. No pericardial effusion. Left atrial  appendage clip. Multiple surgical clips overlying the heart secondary  to coronary artery bypass. Multivessel coronary artery atherosclerotic  calcifications.    MEDIASTINUM: Anterior mediastinal subcutaneous air and fluid with  mediastinal tubes in place. Normal diameter of the great vessels.  Normal branching pattern of the aortic arch. Evaluation of vessel  patency is limited due to lack of intravenous contrast.    ESOPHAGUS: Unremarkable.    ABDOMEN/PELVIS:  HEPATIC: Cirrhotic morphology. No focal hepatic mass.    BILIARY: Cholelithiasis and sludge without evidence of acute  cholecystitis.    PANCREAS: No focal pancreatic lesion. The main pancreatic duct is not  dilated.    SPLEEN: No splenomegaly. No suspicious lesions or  masses.    ADRENALS: Unremarkable.    RENAL: No hydronephrosis, calculi, or mass.    URINARY BLADDER: Decompressed, Rodriguez catheter is centered within the  urinary bladder lumen.  No focal mass.     REPRODUCTIVE: Unremarkable.    GASTROINTESTINAL: Unremarkable stomach and duodenum. Normal caliber  large and small bowel. No pneumatosis or portal venous gas.    MESENTERY/PERITONEUM: Trace abdominal pelvic ascites. No  pneumoperitoneum.    VASCULAR: Nonaneurysmal abdominal aorta. Moderate-to-severe  atherosclerotic vascular calcifications. Patency of the vasculature  cannot be assessed on noncontrast exam.    LYMPH NODES: No mesenteric lymphadenopathy.     MUSCULOSKELETAL: Degenerative changes are appropriate for patient's  age. No acute fracture. Adequate approximation of the sternum with  sternotomy wires present and intact. No suspicious osseous  abnormality.     SOFT TISSUES: Epidural stimulator device is positioned within the  right flank/buttock region with the distal leads positioned over the  posterior spinal canal of the low thorax/upper lumbar spine. Mild  diffuse anasarca. Subcutaneous air involving the right greater than  left groin, presumably from vascular access. No significant fluid  collection or stranding. Bilateral fat-containing inguinal hernias.  Small amount of subcutaneous air along the left chest. Bilateral rib  deformities.      Impression    IMPRESSION:   1.  New large right pneumothorax with collapse of the right lung  parenchyma and mild shifting of the mediastinum to the left. New  compared to 1/3/2025 chest x-ray.  2.  Postoperative changes of coronary artery bypass. Sternotomy wires  are intact. Mediastinal drains and left basilar chest tube are in  position. Small amount of residual air and fluid along the anterior  mediastinum with subcutaneous air extending into the neck. Presumably  these findings are iatrogenic. Attention on follow-up.  3.  Right small pleural effusion.  4.  Small  volume of abdominopelvic ascites.  5.  Other incidental findings as noted in the body of the report.    [Urgent Result: Large right-sided pneumothorax, presumably new  compared to prior CT and radiographs, with slight shifting of the  mediastinum to the left.]    Finding was identified on 1/4/2025 5:54 AM.     Lio Persaud RN was contacted by Dr. Victor M Fine at 1/4/2025 6:03  AM and verbalized understanding of the urgent finding.     I have personally reviewed the examination and initial interpretation  and I agree with the findings.    ABIGAIL WELLER MD         SYSTEM ID:  [id number]   XR Chest Port 1 View    Narrative    EXAM: XR CHEST PORT 1 VIEW  1/4/2025 10:19 AM      HISTORY: Post chest tube placement for R tension pneumothorax    COMPARISON: Same day CT    FINDINGS: Single view of the chest. Median sternotomy wires. Left  atrial appendage clip. Mediastinal chest tubes. Interval placement of  right apically directed chest tube. Right IJ approach [place]-Bala  catheter with tip projecting over the distal main pulmonary artery.  Partially visualized spinal stimulator device. Gastric tube tip and  sidehole project over the stomach. Left basilar chest tube.  Endotracheal tube tip projects 9.1 cm from the edy. Median  sternotomy wires. ECMO cannula has been removed.    Trachea is midline. Stable cardiac silhouette. Mild perihilar and  bibasilar interstitial and airspace opacities. Trace right  pneumothorax. No substantial pleural effusion.      Impression    IMPRESSION:   1. Interval placement of a right apically directed chest tube with  trace residual right pneumothorax.  2. Stable support devices as detailed above.    I have personally reviewed the examination and initial interpretation  and I agree with the findings.    ABIGAIL WELLER MD         SYSTEM ID:  [id number]

## 2025-01-05 ENCOUNTER — APPOINTMENT (OUTPATIENT)
Dept: GENERAL RADIOLOGY | Facility: CLINIC | Age: 65
DRG: 003 | End: 2025-01-05
Attending: STUDENT IN AN ORGANIZED HEALTH CARE EDUCATION/TRAINING PROGRAM
Payer: MEDICARE

## 2025-01-05 ENCOUNTER — APPOINTMENT (OUTPATIENT)
Dept: CARDIOLOGY | Facility: CLINIC | Age: 65
DRG: 003 | End: 2025-01-05
Attending: STUDENT IN AN ORGANIZED HEALTH CARE EDUCATION/TRAINING PROGRAM
Payer: MEDICARE

## 2025-01-05 LAB
ALLEN'S TEST: ABNORMAL
ANION GAP SERPL CALCULATED.3IONS-SCNC: 11 MMOL/L (ref 7–15)
ANION GAP SERPL CALCULATED.3IONS-SCNC: 9 MMOL/L (ref 7–15)
BACTERIA BLD CULT: NO GROWTH
BACTERIA BLD CULT: NO GROWTH
BACTERIA SPT CULT: NORMAL
BASE EXCESS BLDA CALC-SCNC: 4.1 MMOL/L (ref -3–3)
BASE EXCESS BLDA CALC-SCNC: 4.2 MMOL/L (ref -3–3)
BASE EXCESS BLDA CALC-SCNC: 4.4 MMOL/L (ref -3–3)
BASE EXCESS BLDA CALC-SCNC: 5 MMOL/L (ref -3–3)
BASE EXCESS BLDV CALC-SCNC: 3.4 MMOL/L (ref -3–3)
BASE EXCESS BLDV CALC-SCNC: 4.2 MMOL/L (ref -3–3)
BASE EXCESS BLDV CALC-SCNC: 4.7 MMOL/L (ref -3–3)
BASE EXCESS BLDV CALC-SCNC: 5.8 MMOL/L (ref -3–3)
BUN SERPL-MCNC: 44.7 MG/DL (ref 8–23)
BUN SERPL-MCNC: 47.7 MG/DL (ref 8–23)
CA-I BLD-MCNC: 4 MG/DL (ref 4.4–5.2)
CA-I BLD-MCNC: 4.1 MG/DL (ref 4.4–5.2)
CALCIUM SERPL-MCNC: 7.4 MG/DL (ref 8.8–10.4)
CALCIUM SERPL-MCNC: 7.7 MG/DL (ref 8.8–10.4)
CHLORIDE SERPL-SCNC: 114 MMOL/L (ref 98–107)
CHLORIDE SERPL-SCNC: 117 MMOL/L (ref 98–107)
CREAT SERPL-MCNC: 1.24 MG/DL (ref 0.67–1.17)
CREAT SERPL-MCNC: 1.31 MG/DL (ref 0.67–1.17)
EGFRCR SERPLBLD CKD-EPI 2021: 60 ML/MIN/1.73M2
EGFRCR SERPLBLD CKD-EPI 2021: 65 ML/MIN/1.73M2
ERYTHROCYTE [DISTWIDTH] IN BLOOD BY AUTOMATED COUNT: 17.1 % (ref 10–15)
ERYTHROCYTE [DISTWIDTH] IN BLOOD BY AUTOMATED COUNT: 17.3 % (ref 10–15)
GLUCOSE BLDC GLUCOMTR-MCNC: 106 MG/DL (ref 70–99)
GLUCOSE BLDC GLUCOMTR-MCNC: 118 MG/DL (ref 70–99)
GLUCOSE BLDC GLUCOMTR-MCNC: 119 MG/DL (ref 70–99)
GLUCOSE BLDC GLUCOMTR-MCNC: 122 MG/DL (ref 70–99)
GLUCOSE BLDC GLUCOMTR-MCNC: 131 MG/DL (ref 70–99)
GLUCOSE BLDC GLUCOMTR-MCNC: 135 MG/DL (ref 70–99)
GLUCOSE BLDC GLUCOMTR-MCNC: 138 MG/DL (ref 70–99)
GLUCOSE BLDC GLUCOMTR-MCNC: 139 MG/DL (ref 70–99)
GLUCOSE BLDC GLUCOMTR-MCNC: 139 MG/DL (ref 70–99)
GLUCOSE BLDC GLUCOMTR-MCNC: 141 MG/DL (ref 70–99)
GLUCOSE BLDC GLUCOMTR-MCNC: 141 MG/DL (ref 70–99)
GLUCOSE BLDC GLUCOMTR-MCNC: 142 MG/DL (ref 70–99)
GLUCOSE BLDC GLUCOMTR-MCNC: 144 MG/DL (ref 70–99)
GLUCOSE SERPL-MCNC: 152 MG/DL (ref 70–99)
GLUCOSE SERPL-MCNC: 165 MG/DL (ref 70–99)
GRAM STAIN RESULT: NORMAL
GRAM STAIN RESULT: NORMAL
HCO3 BLD-SCNC: 27 MMOL/L (ref 21–28)
HCO3 BLD-SCNC: 28 MMOL/L (ref 21–28)
HCO3 BLDV-SCNC: 27 MMOL/L (ref 21–28)
HCO3 BLDV-SCNC: 28 MMOL/L (ref 21–28)
HCO3 BLDV-SCNC: 29 MMOL/L (ref 21–28)
HCO3 BLDV-SCNC: 29 MMOL/L (ref 21–28)
HCO3 SERPL-SCNC: 24 MMOL/L (ref 22–29)
HCO3 SERPL-SCNC: 25 MMOL/L (ref 22–29)
HCT VFR BLD AUTO: 21.4 % (ref 40–53)
HCT VFR BLD AUTO: 21.7 % (ref 40–53)
HGB BLD-MCNC: 7.2 G/DL (ref 13.3–17.7)
HGB BLD-MCNC: 7.3 G/DL (ref 13.3–17.7)
HGB BLD-MCNC: 7.3 G/DL (ref 13.3–17.7)
LACTATE SERPL-SCNC: 1.2 MMOL/L (ref 0.7–2)
LACTATE SERPL-SCNC: 1.2 MMOL/L (ref 0.7–2)
LVEF ECHO: NORMAL
MAGNESIUM SERPL-MCNC: 2.3 MG/DL (ref 1.7–2.3)
MAGNESIUM SERPL-MCNC: 2.4 MG/DL (ref 1.7–2.3)
MCH RBC QN AUTO: 29.3 PG (ref 26.5–33)
MCH RBC QN AUTO: 29.4 PG (ref 26.5–33)
MCHC RBC AUTO-ENTMCNC: 33.2 G/DL (ref 31.5–36.5)
MCHC RBC AUTO-ENTMCNC: 34.1 G/DL (ref 31.5–36.5)
MCV RBC AUTO: 86 FL (ref 78–100)
MCV RBC AUTO: 88 FL (ref 78–100)
O2/TOTAL GAS SETTING VFR VENT: 30 %
OXYHGB MFR BLDA: 95 % (ref 92–100)
OXYHGB MFR BLDA: 97 % (ref 92–100)
OXYHGB MFR BLDA: 98 % (ref 92–100)
OXYHGB MFR BLDA: 99 % (ref 92–100)
OXYHGB MFR BLDV: 42 % (ref 70–75)
OXYHGB MFR BLDV: 43 % (ref 70–75)
OXYHGB MFR BLDV: 48 % (ref 70–75)
OXYHGB MFR BLDV: 48 % (ref 70–75)
PCO2 BLD: 30 MM HG (ref 35–45)
PCO2 BLD: 31 MM HG (ref 35–45)
PCO2 BLD: 32 MM HG (ref 35–45)
PCO2 BLD: 35 MM HG (ref 35–45)
PCO2 BLDV: 36 MM HG (ref 40–50)
PCO2 BLDV: 37 MM HG (ref 40–50)
PCO2 BLDV: 37 MM HG (ref 40–50)
PCO2 BLDV: 44 MM HG (ref 40–50)
PEEP: 8 CM H2O
PH BLD: 7.51 [PH] (ref 7.35–7.45)
PH BLD: 7.54 [PH] (ref 7.35–7.45)
PH BLD: 7.55 [PH] (ref 7.35–7.45)
PH BLD: 7.57 [PH] (ref 7.35–7.45)
PH BLDV: 7.43 [PH] (ref 7.32–7.43)
PH BLDV: 7.47 [PH] (ref 7.32–7.43)
PH BLDV: 7.5 [PH] (ref 7.32–7.43)
PH BLDV: 7.51 [PH] (ref 7.32–7.43)
PHOSPHATE SERPL-MCNC: 2.6 MG/DL (ref 2.5–4.5)
PHOSPHATE SERPL-MCNC: 3.5 MG/DL (ref 2.5–4.5)
PLATELET # BLD AUTO: 80 10E3/UL (ref 150–450)
PLATELET # BLD AUTO: 93 10E3/UL (ref 150–450)
PO2 BLD: 101 MM HG (ref 80–105)
PO2 BLD: 109 MM HG (ref 80–105)
PO2 BLD: 144 MM HG (ref 80–105)
PO2 BLD: 72 MM HG (ref 80–105)
PO2 BLDV: 26 MM HG (ref 25–47)
PO2 BLDV: 27 MM HG (ref 25–47)
PO2 BLDV: 27 MM HG (ref 25–47)
PO2 BLDV: 28 MM HG (ref 25–47)
POTASSIUM SERPL-SCNC: 3.5 MMOL/L (ref 3.4–5.3)
POTASSIUM SERPL-SCNC: 3.6 MMOL/L (ref 3.4–5.3)
RBC # BLD AUTO: 2.46 10E6/UL (ref 4.4–5.9)
RBC # BLD AUTO: 2.48 10E6/UL (ref 4.4–5.9)
SAO2 % BLDA: 97.5 % (ref 96–97)
SAO2 % BLDA: 99.2 % (ref 96–97)
SAO2 % BLDA: 99.2 % (ref 96–97)
SAO2 % BLDA: >100 % (ref 96–97)
SAO2 % BLDV: 42.5 % (ref 70–75)
SAO2 % BLDV: 44.5 % (ref 70–75)
SAO2 % BLDV: 48.7 % (ref 70–75)
SAO2 % BLDV: 49.5 % (ref 70–75)
SODIUM SERPL-SCNC: 148 MMOL/L (ref 135–145)
SODIUM SERPL-SCNC: 151 MMOL/L (ref 135–145)
SODIUM SERPL-SCNC: 152 MMOL/L (ref 135–145)
WBC # BLD AUTO: 11.7 10E3/UL (ref 4–11)
WBC # BLD AUTO: 13.9 10E3/UL (ref 4–11)

## 2025-01-05 PROCEDURE — 250N000011 HC RX IP 250 OP 636: Performed by: STUDENT IN AN ORGANIZED HEALTH CARE EDUCATION/TRAINING PROGRAM

## 2025-01-05 PROCEDURE — 250N000009 HC RX 250

## 2025-01-05 PROCEDURE — 84295 ASSAY OF SERUM SODIUM: CPT | Performed by: STUDENT IN AN ORGANIZED HEALTH CARE EDUCATION/TRAINING PROGRAM

## 2025-01-05 PROCEDURE — 85027 COMPLETE CBC AUTOMATED: CPT | Performed by: NURSE PRACTITIONER

## 2025-01-05 PROCEDURE — 999N000208 ECHOCARDIOGRAM COMPLETE

## 2025-01-05 PROCEDURE — 83605 ASSAY OF LACTIC ACID: CPT | Performed by: STUDENT IN AN ORGANIZED HEALTH CARE EDUCATION/TRAINING PROGRAM

## 2025-01-05 PROCEDURE — 93308 TTE F-UP OR LMTD: CPT | Mod: 26 | Performed by: INTERNAL MEDICINE

## 2025-01-05 PROCEDURE — 250N000011 HC RX IP 250 OP 636

## 2025-01-05 PROCEDURE — 82330 ASSAY OF CALCIUM: CPT | Performed by: NURSE PRACTITIONER

## 2025-01-05 PROCEDURE — 250N000011 HC RX IP 250 OP 636: Mod: JZ | Performed by: NURSE PRACTITIONER

## 2025-01-05 PROCEDURE — 82805 BLOOD GASES W/O2 SATURATION: CPT

## 2025-01-05 PROCEDURE — 83735 ASSAY OF MAGNESIUM: CPT

## 2025-01-05 PROCEDURE — 71045 X-RAY EXAM CHEST 1 VIEW: CPT | Mod: 26 | Performed by: RADIOLOGY

## 2025-01-05 PROCEDURE — 80048 BASIC METABOLIC PNL TOTAL CA: CPT | Performed by: NURSE PRACTITIONER

## 2025-01-05 PROCEDURE — 82805 BLOOD GASES W/O2 SATURATION: CPT | Performed by: NURSE PRACTITIONER

## 2025-01-05 PROCEDURE — 250N000013 HC RX MED GY IP 250 OP 250 PS 637

## 2025-01-05 PROCEDURE — 99232 SBSQ HOSP IP/OBS MODERATE 35: CPT | Mod: GC | Performed by: INTERNAL MEDICINE

## 2025-01-05 PROCEDURE — 84100 ASSAY OF PHOSPHORUS: CPT

## 2025-01-05 PROCEDURE — 250N000013 HC RX MED GY IP 250 OP 250 PS 637: Performed by: PHYSICIAN ASSISTANT

## 2025-01-05 PROCEDURE — 93325 DOPPLER ECHO COLOR FLOW MAPG: CPT | Mod: 26 | Performed by: INTERNAL MEDICINE

## 2025-01-05 PROCEDURE — 84100 ASSAY OF PHOSPHORUS: CPT | Performed by: STUDENT IN AN ORGANIZED HEALTH CARE EDUCATION/TRAINING PROGRAM

## 2025-01-05 PROCEDURE — 83735 ASSAY OF MAGNESIUM: CPT | Performed by: STUDENT IN AN ORGANIZED HEALTH CARE EDUCATION/TRAINING PROGRAM

## 2025-01-05 PROCEDURE — 93005 ELECTROCARDIOGRAM TRACING: CPT

## 2025-01-05 PROCEDURE — 250N000011 HC RX IP 250 OP 636: Mod: JZ | Performed by: PHYSICIAN ASSISTANT

## 2025-01-05 PROCEDURE — 93321 DOPPLER ECHO F-UP/LMTD STD: CPT | Mod: 26 | Performed by: INTERNAL MEDICINE

## 2025-01-05 PROCEDURE — 255N000002 HC RX 255 OP 636: Performed by: INTERNAL MEDICINE

## 2025-01-05 PROCEDURE — 250N000013 HC RX MED GY IP 250 OP 250 PS 637: Performed by: STUDENT IN AN ORGANIZED HEALTH CARE EDUCATION/TRAINING PROGRAM

## 2025-01-05 PROCEDURE — 82330 ASSAY OF CALCIUM: CPT | Performed by: PHYSICIAN ASSISTANT

## 2025-01-05 PROCEDURE — C8929 TTE W OR WO FOL WCON,DOPPLER: HCPCS

## 2025-01-05 PROCEDURE — 250N000013 HC RX MED GY IP 250 OP 250 PS 637: Performed by: NURSE PRACTITIONER

## 2025-01-05 PROCEDURE — 999N000157 HC STATISTIC RCP TIME EA 10 MIN

## 2025-01-05 PROCEDURE — 200N000002 HC R&B ICU UMMC

## 2025-01-05 PROCEDURE — 258N000003 HC RX IP 258 OP 636: Performed by: STUDENT IN AN ORGANIZED HEALTH CARE EDUCATION/TRAINING PROGRAM

## 2025-01-05 PROCEDURE — 250N000011 HC RX IP 250 OP 636: Performed by: NURSE PRACTITIONER

## 2025-01-05 PROCEDURE — 83605 ASSAY OF LACTIC ACID: CPT

## 2025-01-05 PROCEDURE — 84295 ASSAY OF SERUM SODIUM: CPT | Performed by: NURSE PRACTITIONER

## 2025-01-05 PROCEDURE — 71045 X-RAY EXAM CHEST 1 VIEW: CPT

## 2025-01-05 PROCEDURE — 85018 HEMOGLOBIN: CPT | Performed by: STUDENT IN AN ORGANIZED HEALTH CARE EDUCATION/TRAINING PROGRAM

## 2025-01-05 PROCEDURE — 999N000208 ECHOCARDIOGRAM LIMITED

## 2025-01-05 RX ORDER — OLANZAPINE 5 MG/1
5 TABLET, ORALLY DISINTEGRATING ORAL EVERY 6 HOURS PRN
Status: DISCONTINUED | OUTPATIENT
Start: 2025-01-05 | End: 2025-01-11

## 2025-01-05 RX ORDER — POLYETHYLENE GLYCOL 3350 17 G/17G
17 POWDER, FOR SOLUTION ORAL DAILY
Status: DISCONTINUED | OUTPATIENT
Start: 2025-01-06 | End: 2025-01-07

## 2025-01-05 RX ORDER — FENTANYL CITRATE 50 UG/ML
25 INJECTION, SOLUTION INTRAMUSCULAR; INTRAVENOUS ONCE
Status: COMPLETED | OUTPATIENT
Start: 2025-01-05 | End: 2025-01-05

## 2025-01-05 RX ORDER — AMOXICILLIN 250 MG
1 CAPSULE ORAL 2 TIMES DAILY
Status: DISCONTINUED | OUTPATIENT
Start: 2025-01-05 | End: 2025-01-07

## 2025-01-05 RX ORDER — POTASSIUM CHLORIDE 1.5 G/1.58G
20 POWDER, FOR SOLUTION ORAL ONCE
Status: COMPLETED | OUTPATIENT
Start: 2025-01-05 | End: 2025-01-05

## 2025-01-05 RX ORDER — HYDROXYZINE HYDROCHLORIDE 50 MG/1
50 TABLET, FILM COATED ORAL EVERY 6 HOURS PRN
Status: DISCONTINUED | OUTPATIENT
Start: 2025-01-05 | End: 2025-01-15

## 2025-01-05 RX ORDER — CALCIUM GLUCONATE 20 MG/ML
2 INJECTION, SOLUTION INTRAVENOUS ONCE
Status: COMPLETED | OUTPATIENT
Start: 2025-01-05 | End: 2025-01-05

## 2025-01-05 RX ORDER — HYDROXYZINE HYDROCHLORIDE 25 MG/1
25 TABLET, FILM COATED ORAL EVERY 6 HOURS PRN
Status: DISCONTINUED | OUTPATIENT
Start: 2025-01-05 | End: 2025-01-15

## 2025-01-05 RX ADMIN — DEXMEDETOMIDINE HYDROCHLORIDE 1.2 MCG/KG/HR: 4 INJECTION, SOLUTION INTRAVENOUS at 22:50

## 2025-01-05 RX ADMIN — Medication 60 ML: at 08:17

## 2025-01-05 RX ADMIN — DEXMEDETOMIDINE HYDROCHLORIDE 1.2 MCG/KG/HR: 4 INJECTION, SOLUTION INTRAVENOUS at 14:54

## 2025-01-05 RX ADMIN — DEXMEDETOMIDINE HYDROCHLORIDE 1.2 MCG/KG/HR: 4 INJECTION, SOLUTION INTRAVENOUS at 18:51

## 2025-01-05 RX ADMIN — Medication 0.3 MG/HR: at 11:36

## 2025-01-05 RX ADMIN — HEPARIN SODIUM 5000 UNITS: 5000 INJECTION, SOLUTION INTRAVENOUS; SUBCUTANEOUS at 16:06

## 2025-01-05 RX ADMIN — BACLOFEN 10 MG: 10 TABLET ORAL at 00:17

## 2025-01-05 RX ADMIN — BACLOFEN 10 MG: 10 TABLET ORAL at 08:10

## 2025-01-05 RX ADMIN — MIDAZOLAM 1 MG: 1 INJECTION INTRAMUSCULAR; INTRAVENOUS at 10:50

## 2025-01-05 RX ADMIN — POTASSIUM & SODIUM PHOSPHATES POWDER PACK 280-160-250 MG 1 PACKET: 280-160-250 PACK at 08:16

## 2025-01-05 RX ADMIN — Medication 10 MG: at 22:18

## 2025-01-05 RX ADMIN — HYDROXYZINE HYDROCHLORIDE 25 MG: 25 TABLET, FILM COATED ORAL at 11:30

## 2025-01-05 RX ADMIN — CALCIUM GLUCONATE 2 G: 20 INJECTION, SOLUTION INTRAVENOUS at 17:13

## 2025-01-05 RX ADMIN — CALCIUM GLUCONATE 1 G: 20 INJECTION, SOLUTION INTRAVENOUS at 04:19

## 2025-01-05 RX ADMIN — DEXMEDETOMIDINE HYDROCHLORIDE 0.8 MCG/KG/HR: 4 INJECTION, SOLUTION INTRAVENOUS at 10:17

## 2025-01-05 RX ADMIN — PIPERACILLIN SODIUM AND TAZOBACTAM SODIUM 4.5 G: 4; .5 INJECTION, SOLUTION INTRAVENOUS at 13:04

## 2025-01-05 RX ADMIN — BACLOFEN 10 MG: 10 TABLET ORAL at 15:18

## 2025-01-05 RX ADMIN — HYDROXYZINE HYDROCHLORIDE 50 MG: 50 TABLET ORAL at 22:19

## 2025-01-05 RX ADMIN — CHLORHEXIDINE GLUCONATE 15 ML: 1.2 SOLUTION ORAL at 20:21

## 2025-01-05 RX ADMIN — OLANZAPINE 5 MG: 5 TABLET, ORALLY DISINTEGRATING ORAL at 15:18

## 2025-01-05 RX ADMIN — POTASSIUM CHLORIDE 20 MEQ: 1.5 POWDER, FOR SOLUTION ORAL at 16:57

## 2025-01-05 RX ADMIN — Medication 1500 MG: at 03:26

## 2025-01-05 RX ADMIN — PANTOPRAZOLE SODIUM 40 MG: 40 INJECTION, POWDER, FOR SOLUTION INTRAVENOUS at 08:10

## 2025-01-05 RX ADMIN — CHLORHEXIDINE GLUCONATE 15 ML: 1.2 SOLUTION ORAL at 08:10

## 2025-01-05 RX ADMIN — HEPARIN SODIUM 5000 UNITS: 5000 INJECTION, SOLUTION INTRAVENOUS; SUBCUTANEOUS at 08:10

## 2025-01-05 RX ADMIN — FENTANYL CITRATE 25 MCG: 50 INJECTION INTRAMUSCULAR; INTRAVENOUS at 04:18

## 2025-01-05 RX ADMIN — SODIUM CHLORIDE, POTASSIUM CHLORIDE, SODIUM LACTATE AND CALCIUM CHLORIDE 500 ML: 600; 310; 30; 20 INJECTION, SOLUTION INTRAVENOUS at 09:51

## 2025-01-05 RX ADMIN — HUMAN ALBUMIN MICROSPHERES AND PERFLUTREN 6 ML: 10; .22 INJECTION, SOLUTION INTRAVENOUS at 08:01

## 2025-01-05 RX ADMIN — SERTRALINE HYDROCHLORIDE 200 MG: 100 TABLET ORAL at 08:10

## 2025-01-05 RX ADMIN — PIPERACILLIN SODIUM AND TAZOBACTAM SODIUM 4.5 G: 4; .5 INJECTION, SOLUTION INTRAVENOUS at 06:28

## 2025-01-05 RX ADMIN — ROSUVASTATIN CALCIUM 40 MG: 20 TABLET, FILM COATED ORAL at 08:10

## 2025-01-05 RX ADMIN — Medication 15 ML: at 08:11

## 2025-01-05 RX ADMIN — Medication 75 MCG/HR: at 11:03

## 2025-01-05 RX ADMIN — OXYCODONE HYDROCHLORIDE 5 MG: 5 TABLET ORAL at 02:04

## 2025-01-05 RX ADMIN — PIPERACILLIN SODIUM AND TAZOBACTAM SODIUM 4.5 G: 4; .5 INJECTION, SOLUTION INTRAVENOUS at 00:13

## 2025-01-05 RX ADMIN — HEPARIN SODIUM 5000 UNITS: 5000 INJECTION, SOLUTION INTRAVENOUS; SUBCUTANEOUS at 00:13

## 2025-01-05 RX ADMIN — ASPIRIN 81 MG CHEWABLE TABLET 162 MG: 81 TABLET CHEWABLE at 08:11

## 2025-01-05 RX ADMIN — POTASSIUM & SODIUM PHOSPHATES POWDER PACK 280-160-250 MG 1 PACKET: 280-160-250 PACK at 06:28

## 2025-01-05 RX ADMIN — PIPERACILLIN SODIUM AND TAZOBACTAM SODIUM 4.5 G: 4; .5 INJECTION, SOLUTION INTRAVENOUS at 18:37

## 2025-01-05 RX ADMIN — POTASSIUM CHLORIDE 20 MEQ: 1.5 POWDER, FOR SOLUTION ORAL at 06:28

## 2025-01-05 RX ADMIN — DEXMEDETOMIDINE HYDROCHLORIDE 1.2 MCG/KG/HR: 4 INJECTION, SOLUTION INTRAVENOUS at 05:45

## 2025-01-05 ASSESSMENT — ACTIVITIES OF DAILY LIVING (ADL)
ADLS_ACUITY_SCORE: 53
ADLS_ACUITY_SCORE: 51
ADLS_ACUITY_SCORE: 55
ADLS_ACUITY_SCORE: 51
ADLS_ACUITY_SCORE: 53
ADLS_ACUITY_SCORE: 55
ADLS_ACUITY_SCORE: 51
ADLS_ACUITY_SCORE: 55
ADLS_ACUITY_SCORE: 53
ADLS_ACUITY_SCORE: 51
ADLS_ACUITY_SCORE: 53
ADLS_ACUITY_SCORE: 51
ADLS_ACUITY_SCORE: 53
ADLS_ACUITY_SCORE: 51
ADLS_ACUITY_SCORE: 53
ADLS_ACUITY_SCORE: 51
ADLS_ACUITY_SCORE: 53
ADLS_ACUITY_SCORE: 55
ADLS_ACUITY_SCORE: 53

## 2025-01-05 NOTE — PLAN OF CARE
ICU End of Shift Summary. See flowsheets for vital signs and detailed assessment.    Major Changes: attempting to optimize sedation, extremely agitated this AM, fentanyl gtt changed to dilaudid, Zyprexa and atarax given, BP drops to 40s when patient relaxes, systolic 150s when agitated, when pt relaxed HR in 50s, team dropped temp pacer to 54 bpm, pt is occasionally paced    Neuro: not following commands, agitation waxes and wanes, OGLESBY with agitation, pupils round and reactive, noticed L>R pupil -team to bedside- okay per team, paused dilaudid- normalized within the hour, npis>4  Cardiac: SR 50-70s, pacing rate 54 AAI, low grade fever, BP very labile   CVP 15 Pa 32/15 CI 4.6 SvO2 42  - team aware   Respiratory: CMV 30% P 8 RR 14  overbreathes with agitation, lungs clear to dim, CTs in place with minimal output  GI/: TF at goal, UO 30-60/hr, rectal tube placed with 500 out, medium leakage  Skin: bilateral groins, L hematoma from ecmo site  Drips: dilaudid 0.3, dex 1.2, insulin 2, levo and vaso on and off   Labs: Na 152 (FWF increased to 200q4), K replaced, iCal (2G given per team)    Plan: Continue plan of care    RODERICK Hernandez  January 5, 2025      Goal Outcome Evaluation:      Plan of Care Reviewed With: patient    Overall Patient Progress: no changeOverall Patient Progress: no change    Outcome Evaluation: managing agitation and pressor needs

## 2025-01-05 NOTE — PROGRESS NOTES
[facility]    ICU Progress Note       Date of Admission:  12/31/2024    Assessment: Critical Care   Erwin Aguilar is a 64 year old male with a past medical history of DM2, HTN, PVD, and neuropathic pain who initially presented on 12/31/24 to the ED with arm pain, vomiting, and diarrhea; ACS and ST depression. He subsequently had VT arrest with 1 round of CPR, epinephrine, defibrillation. After ROSC had afib with RVR. Became hypotensive and was subsequently cannulated for VA ECMO on 1/2/25, s/p CABG x4, and arrived to the ICU at that time. He was de cannulated on 1/3/25 at bedside. ICU course complicated by large R ptx seen on chest CT on 1/4/25, CVTS Fellow placed chest tube at bedside with improvement of pneumothorax.       MAJOR CHANGES/UPDATES  - ON,pt had restlessness so melatonin 10 mg started. ECG obtained showed prolonged QTc of 549 so not able to start other oral sedation meds.   - Pt has had ongoing issues with labile BPs, pt is hypertensive while awake and restless but will become hypotensive if overly sedated. He didn't respond appropriate to 500 ml LR bolus and PVV was 10. Pt also didn't respond well with Versed IV 1 mg push x1   - Will switch from fentanyl to dilaudid gtt for analgesia to rule out pain as source of agitation/tachypnea  - Pt with ongoing issues of respiratory alkalosis and overbreathing vent likely secondary to agitation will try to optimize analgesia/sedation plan and repeat ECG to reassess QTc   - Wean dobutamine off  - TTE today to reassess cardiac function post ECMO  - Restart PTA glargine 26 units q daily tomorrow  - Will discontinue vanc because MRSA nares swab negative     Plan: Critical Care     PLAN:  Neuro/ pain/ sedation:  # Acute postoperative pain  # Chronic pain with opioid dependence  # Neuropathy, PTA  # Sedation while on mechanical ventilation   # Agitation  # Delrium   Analgesia   - Gtt will switch from fentanyl to Dilaudid  - wean as able   - Scheduled: baclofen    - PRN: oxycodone  Sedation    - Gtt: Precedex gtt, titrate to RASS goal    - Scheduled: Melaontin 10 mg at bedtime    - PRN: will add on hydroxyzine, repeat ECG showed QTc of 475, will also add on quetiapine for agitation   Pt seemed to have improved restlessness with hydroxyzine   RASS goal 0 to -1       # Generalized Anxiety, PTA   - Continue PTA Sertraline  - Restart PTA PRN hydroxyzine      # Encephalopathy of unclear etiology   - Prior to procedure  - 1/1 vEEG without seizure activity (severe encephalopathy) and 12/31 and 1/4 CTH negative for acute findings.     Pulmonary care:   # Acute Hypoxic Respiratory failure   # Respiratory alkalosis 2:2 agitation   # R tension pneumothorax   # Aspiration Pneumonia, Klebsiella  FiO2 (%): 30 %, Resp: 17, Vent Mode: CMV/AC, Resp Rate (Set): 12 breaths/min, Tidal Volume (Set, mL): 400 mL, PEEP (cm H2O): 8 cmH2O, Resp Rate (Set): 12 breaths/min, Tidal Volume (Set, mL): 400 mL, PEEP (cm H2O): 8 cmH2O   - ON of 1/4 CT C/A/P ordered for persistently elevated lactate which revealed large R tension pneumothorax. CVTS notified and fellow placed chest tube at bedside this morning with improvement of pneumothorax.   - Pt with ongoing issues of respiratory alkalosis and overbreathing vent likely secondary to agitation see Neuro for plan  - Wean vent as able  - VAP bundle while intubated  - Goal SpO2 > 92%  - CXR on arrival, then daily   - CXR this morning with slightly improved on the R side and increased opacities on the L side, per my review, compared to previous   - Monitor CT output   - Please see ID for abx plan     Cardiovascular:    # Mixed Shock, cardiogenic, vasoplegic  # HTN, PTA   # VT Cardiac Arrest on VA ECMO (01/01/24 - 1/3)  # CAD s/p CABG x4  # Dyslipidemia, PTA   # Cardiomyopathy   1/2/25 Echo 30-35%, mild MR, RV   - Monitor hemodynamics closely. Goal MAP >65, SBP <140   -  NE and dobutamine gtts for inotropic and pressor  support, wean as able  - Plan to wean off dobutamine, recheck Lakisha numbers, and discontinue   - Pt still with ongoing issues with labile BP added on  to see if pt responds better than NE  - Pt has had ongoing issues with labile BPs, pt is hypertensive while awake and restless but will become hypotensive if overly sedated. He didn't respond appropriate to 500 ml LR bolus and PVV was 10. Pt also didn't respond well with Versed IV 1 mg push x1   - Aspirin 81mg daily   - Crestor 40mg daily  - A/V wires back up rate decreased to 60 bpm with pt's intrinsic rate in low 70s.   - Hold PTA apixaban, Dyazide, propanolol   - Plan to reassess cardiac function post ECMO decan and recent R tension ptx TTE today pending report     GI care / Nutrition:   # At risk for protein calorie malnutrition   # Constipation   - Diet: TF @ goal via OG   - PPI  - Last BM: 1/5. Bowel reg ordered      Renal / Fluids / Electrolytes:   # Acute Kidney Injury 2/2 ATN - improving   # Oliguria   # Hypokalemia   # Hypernatremia  # Hypocalcemia  BL creat appears to be ~ 0.6-0.8  - Cr this morning down to 1.31 from 1.59  - Strict I/O, daily weights, avoid/limit nephrotoxins  - Pt's K+ this morning 3.6, replaced, recheck this afternoon   - Na+ up to 148 difficult to assess mental status, will continue to monitor, consider increasing FWF if Na+ >150  - Ionized Ca+ 4.1, replaced, recheck tomorrow  - Replete lytes PRN per protocol        Endocrine:    # Stress hyperglycemia  # Type 2 Diabetes Mellitus, PTA   - Preop A1c 5.7  - Insulin gtt  - Goal BG <180 for optimal healing  - Restart PTA Lantus 26 units q daily tomorrow      ID / Antibiotics:  # Stress induced leukocytosis  # Aspiration Pneumonia, Klebsiella  - ON, he was also febrile up to 102.6 F, so abx was broadened to vanc/Zosysn, and pan-cultured  - Broadened abx from CTX (12/31 to 1/4) to vanc/zosyn 1/4/24 EOT TBD will discontinue Vanc as MRSA nares from 12/31 negative   - Monitor fever curve, WBC,  and inflammatory markers as appropriate     Cultures  1/4 UA non-infectious appearing negative LE, nitrites, WBCs, and bacteria   1/4 Sputum cx gram stain 1+ Klebsiella pneumoniae   1/3 blood cx NGTD @ 1 day   12/31 sputum: Klebsiella pneumonia with resistance to ampicillin   12/31 MRSA and blood cultures negative     Heme:     # Acute blood loss anemia  # Acute thrombocytopenia  No s/sx active bleeding  - Trending CBC   - Hgb goal > 7.0, transfuse PRN   - DVT ppx: SQH      MSK / Skin:  # Sternotomy   # Surgical Incision  - Sternal precautions, postop incision management protocol  - PT/OT/CR    #PVD, PTA   - S/p right BKA, 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 All questions were answered. They are in agreement with plan.      Patient seen, findings and plan discussed with CVICU staff and CVTS staff.     Total Critical Care time spent by me, excluding procedures, was 50 minutes.        Denny James PA-C  [facility]  Securely message with [employer] (more info)  Text page via [employer] Paging/Directory     Clinically Significant Risk Factors        # Hypokalemia: Lowest K = 3.2 mmol/L in last 2 days, will replace as needed  # Hypernatremia: Highest Na = 148 mmol/L in last 2 days, will monitor as appropriate  # Hyperchloremia: Highest Cl = 114 mmol/L in last 2 days, will monitor as appropriate      # Hypocalcemia: Lowest iCa = 4.1 mg/dL in last 2 days, will monitor and replace as appropriate     # Hypoalbuminemia: Lowest albumin = 2 g/dL at 1/2/2025  5:22 PM, will monitor as appropriate    # Coagulation Defect: INR = 1.29 (Ref range: 0.85 - 1.15) and/or PTT = 34 Seconds (Ref  "range: 22 - 38 Seconds), will monitor for bleeding  # Thrombocytopenia: Lowest platelets = 80 in last 2 days, will monitor for bleeding  # Acute Kidney Injury, unspecified: based on a >150% or 0.3 mg/dL increase in last creatinine compared to past 90 day average, will monitor renal function  # Hypertension: Noted on problem list  # Chronic heart failure with reduced ejection fraction: last echo with EF <40%    # Acute Hypoxic Respiratory Failure: Documented O2 saturation < 90%. Continue supplemental oxygen as needed  # Acute Hypercapnic Respiratory Failure: based on arterial blood gas results.  Continue supplemental oxygen and ventilatory support as indicated.  # Acute Hypercapnic Respiratory Failure: based on venous blood gas results.  Continue supplemental oxygen and ventilatory support as indicated.         # Overweight: Estimated body mass index is 27.12 kg/m  as calculated from the following:    Height as of this encounter: 1.803 m (5' 11\").    Weight as of this encounter: 88.2 kg (194 lb 7.1 oz).      # Asthma: noted on problem list    # History of CABG: noted on surgical history       ____________________________________________________________________    Interval History   Unable to obtain due to pt's critical condition     Intake/Output  I/O last 3 completed shifts:  In: 3903.33 [I.V.:1758.33; NG/GT:685; IV Piggyback:1000]  Out: 2090 [Urine:1450; Chest Tube:640]      Physical Exam   Vital Signs: Temp: (!) 100.7  F (38.2  C) Temp src: Axillary BP: (!) 141/125 Pulse: 70   Resp: 17 SpO2: 100 % O2 Device: Mechanical Ventilator    Weight: 194 lbs 7.13 oz    Neuro: Intubated. Sedated. Eyes open intermittently doesn't track. All extremities move spontaneously, restless fidgeting. Doesn't follow commands.  HEENT: Normocephalic, atraumatic. PERRL, and nonicteric.   CV: RRR on monitor, S1S2, all extremities well perfused   Respiratory: Tachypneic in the 30s on CMV RR 14, , PEEP 8 , FiO2 30 %. Equal chest rise " b/l. Mediastinal chest tubes with 350 ml of serosanguinous output/24 hours without airleak noted. R pleural with 290 ml of serosanguinous output no airleak noted.    GI: Abdomen soft. Non-distended.   : Voiding with Rodriguez with dark, concentrated urine in Rodriguez bag.   MSK: Right BKA. Moves all extremities well with normal appearing strength. Sensation intact in all upper/lower extremities.     Skin: Sternal incision open to air, no drainage, surrounding erythema or induration noted. Large hematoma on L groin near previous ECMO cannulation site.     Data   I reviewed all medications, new labs and imaging results over the last 24 hours.  Arterial Blood Gases   Recent Labs   Lab 01/05/25  0928 01/05/25  0322 01/04/25  2235 01/04/25  1648   PH 7.55* 7.57* 7.60* 7.60*   PCO2 31* 30* 27* 26*   PO2 101 72* 97 128*   HCO3 27 27 27 26       Complete Blood Count   Recent Labs   Lab 01/05/25  0322 01/04/25  1636 01/04/25  0216 01/03/25  1612   WBC 11.7* 13.7* 21.0* 24.0*   HGB 7.3* 7.4* 7.3* 8.5*   PLT 80* 94* 143* 149*       Basic Metabolic Panel  Recent Labs   Lab 01/05/25  1156 01/05/25  1006 01/05/25  0832 01/05/25  0421 01/05/25  0322 01/04/25  1644 01/04/25  1636 01/04/25  1208 01/04/25  1205 01/04/25  0705 01/04/25  0403 01/04/25  0258 01/04/25  0216   NA  --   --   --   --  148*  --  148*  --   --   --  147*  --  145   POTASSIUM  --   --   --   --  3.6  --  3.7  --  3.8  --  3.2*  --  3.5   CHLORIDE  --   --   --   --  114*  --  114*  --   --   --  111*  --  109*   CO2  --   --   --   --  25  --  24  --   --   --  22  --  20*   BUN  --   --   --   --  44.7*  --  45.2*  --   --   --  40.0*  --  37.8*   CR  --   --   --   --  1.31*  --  1.39*  --   --   --  1.59*  --  1.57*   * 131* 139* 119* 152*   < > 117*   < >  --    < > 155*  168*   < > 173*    < > = values in this interval not displayed.       Liver Function Tests  Recent Labs   Lab 01/04/25  0216 01/03/25  0336 01/02/25  2155 01/02/25 2024 01/02/25  1726  01/02/25  1722   * 165*  --  175*  --  112*   * 65  --  62  --  33   ALKPHOS 66 54  --  59  --  52   BILITOTAL 0.7 0.5  --  0.9  --  0.8   ALBUMIN 2.4* 2.4*  --  2.3*  --  2.0*   INR  --  1.29* 1.42* 1.43* 1.59*  --        Pancreatic Enzymes  Recent Labs   Lab 12/31/24  0752   LIPASE 10*       Coagulation Profile  Recent Labs   Lab 01/03/25  1124 01/03/25  0336 01/02/25  2155 01/02/25 2024 01/02/25  1724   INR  --  1.29* 1.42* 1.43* 1.59*   PTT 34 35 39* 47* 71*       IMAGING:  Recent Results (from the past 24 hours)   XR Chest Port 1 View    Narrative    Exam: XR CHEST PORT 1 VIEW, 1/5/2025 1:35 AM    Indication: R pneumothorax    Comparison: Chest x-ray 1/4/2025    Findings:   Portable AP radiograph of chest at 40 degrees. An endotracheal tube  terminates in the midthoracic trachea. Stable projection of the  mediastinal drains. Right apically directed chest tube terminating at  the right lung apex. Enteric tube crosses the diaphragm and projects  out of the field of view. Right IJ Downey-Bala catheter is partially  visualized with tip terminating in the main pulmonary artery. Stable  projection of epicardial stimulator leads. Postoperative changes of  the chest. Intact median sternotomy wires, atrial appendage clip and  mediastinal surgical clips    Trachea is midline. Trace pneumomediastinum. Right apical pneumothorax  with chest tube in place. Right chest subcutaneous emphysema, similar  to the previous exam. Perihilar and basilar streaky opacities, mildly  increased within the left lung base. No left pneumothorax.  Costophrenic angles are clipped. Mild elevation of the right  hemidiaphragm. Normal lung volumes. No acute osseous abnormality.      Impression    Impression:   1. Small right apical pneumothorax with chest tube in place. Residual  small volume of right chest subcutaneous emphysema is present.  2. Stable projection of support devices and surgical hardware.  3. Streaky perihilar and  basilar opacities, favors pulmonary edema  with superimposed atelectasis.    I have personally reviewed the examination and initial interpretation  and I agree with the findings.    ABIGAIL WELLER MD         SYSTEM ID:  [id number]

## 2025-01-05 NOTE — PLAN OF CARE
ICU End of Shift Summary. Please see flowsheets for vital signs and detailed assessment data.    Changes this shift: Pleural chest tube placed by CTICU MD at bedside in AM. Sedation and pressor support down-titrated. Propofol exchanged for dexmedetomidine, and fentanyl maintained for analgesia. Patient with labile pressures, becoming hypotensive at rest, and increasing above systolic blood pressure goal when alert. Hydralazine given times one for systolic blood pressure greater than 140 mmHg. Tachypnea continued with rates in the 20's and 30's throughout the day. Patient moving all extremities spontaneously and withdrawing from noxious stimulus, but not tracking or following commands. Two 500 mL boluses of Ringer's Lactate given. Oliguria improved.     Plan: Continue with current plan of care, optimizing hemodynamic status as able.     Goal Outcome Evaluation:    Plan of Care Reviewed With: patient    Overall Patient Progress: improving        Anurag Molina RN  Critical Care Flex Team

## 2025-01-05 NOTE — PLAN OF CARE
Goal Outcome Evaluation:      Neuro:  Sedated. Intermittent L pupil slightly bigger than R.  Does not follow commands or track. Bites down on ETT/swabs. No clear cough with suctioning.     CV:  SR 70-80s. A wires connected to pacer rate 70. V wires disconnected. MAP goal > 65, SBP < 140. PRN hydralazine ordered for SBP > 140 Labile BP.   Kimberly @0400: CVP 12, PA 41/18, SVR 1132.6, CI 2.4, SvO2 48%. NADEEN Q6    Pulm:  Vent Mode: CMV 30%/12/400/8. Tachy    GI:  OG @ 63. TF started at 20 mL/hr (goal). Loose BM x2    :  Rodriguez 60ish mL/hr urine output    Skin:  Scabs on chin and right temple.Right temple.  Blister on first finger of right hand.  Blanchable erythema on sacrum/coccyx.  Healed right BKA. Significant bruising post ECMO sites    Drips:  Norepi 0.02  Dobutamine: 2.5  Fent: 75  Insulin 2  Dex: 0.9    Labs: K+ (replaced), Phos (replaced), Ionized Calcium (replaced)      RNCC closing ACC EOC- pt disengaged, denies need. Should pt require assistance in the future, RNCC happy to help.

## 2025-01-05 NOTE — PROGRESS NOTES
Nephrology Progress Note  01/05/2025         Assessment & Recommendations:   AUSTIN  - Baseline kidney function preserved with baseline Cr 0.6  - Etiology of AUSTIN most likely multifactorial including cardiac surgery associated AUSTIN, ischemic ATN given cardiac arrest and need for VA-ECMO. Now with persistent fevers, sepsis-associated ATN is also a consideration.  No e/o obstruction on imaging. Gaston UA and overall Cr trend argue against a GN or AIN.  - No acute need for dialysis at present -- had some UOP response with another small bolus of IVF yesterday. Although he is net positive throughout admission so far, his persistent fevers will cause higher insensible losses (thus probably not actually as net positive over past day as ins/outs would suggest).  - Would consider another small fluid bolus tonight if remains persistently febrile (could do another 500 ml LR).  - Cr continues to trend down, 1.59 --> 1.39 --> 1.31.    Acid/Base  - pH 7.54, pCO2 32, pO2 109, HCO3 27.  - Respiratory alkalosis without metabolic compensation. Due to over breathing the vent. Duration of resp alkalosis is short enough that kidneys have not had time to respond with metabolic compensation.  - Please give a dose of acetazolamide 250 mg IV today      Hypernatremia  - Due to free water losses via insensible losses  - Free water deficit to get him to a Na of 144 is 1.4 L, Please give via free water flushes (could start with 200 ml q4 hours)     Nephrology will sign off today, please reach out with any further questions.    Recommendations communicated to team via this note.    Discussed with Dr Herr.    Jadiel Davidson MD   Division of Renal Disease and Hypertension  [facility] Web Console      Interval History :   Unable to obtain as pt is intubated and sedated.    Review of Systems:   Negative except above.    Physical Exam:   I/O last 3 completed shifts:  In: 3903.33 [I.V.:1758.33; NG/GT:685; IV Piggyback:1000]  Out: 2090  "[Urine:1450; Chest Tube:640]   /59   Pulse 64   Temp (!) 100.7  F (38.2  C) (Axillary)   Resp 21   Ht 1.803 m (5' 11\")   Wt 88.2 kg (194 lb 7.1 oz)   SpO2 100%   BMI 27.12 kg/m       GEN: intubated and sedated, numerous support devices in place  CV: RRR, no m/r/g heard.     RESP: mechanical breath sounds, symmetric chest expansion   ABD: soft, ND  : roby  MSK: no gross joint deformities  SKIN: warm, dry, no rashes or lesions on exposed skin  NEURO: sedated     Labs:   All labs reviewed by me  Electrolytes/Renal -   Recent Labs   Lab Test 01/05/25  1400 01/05/25  1156 01/05/25  1006 01/05/25  0421 01/05/25  0322 01/04/25  1826 01/04/25  1823 01/04/25  1644 01/04/25  1636 01/04/25  1208 01/04/25  1205 01/04/25  0705 01/04/25  0403 01/03/25  0340 01/03/25  0336   NA  --   --   --   --  148*  --   --   --  148*  --   --   --  147*   < > 147*  147*   POTASSIUM  --   --   --   --  3.6  --   --   --  3.7  --  3.8  --  3.2*   < > 4.4  4.4   CHLORIDE  --   --   --   --  114*  --   --   --  114*  --   --   --  111*   < > 112*  112*   CO2  --   --   --   --  25  --   --   --  24  --   --   --  22   < > 28  28   BUN  --   --   --   --  44.7*  --   --   --  45.2*  --   --   --  40.0*   < > 15.9  15.9   CR  --   --   --   --  1.31*  --   --   --  1.39*  --   --   --  1.59*   < > 0.95  0.95   * 144* 131*   < > 152*   < >  --    < > 117*   < >  --    < > 155*  168*   < > 145*  145*   [place]  --   --   --   --  7.7*  --   --   --  7.7*  --   --   --  7.9*   < > 8.5*  8.5*   MAG  --   --   --   --  2.3  --   --   --   --   --   --   --  2.2  --  3.3*   PHOS  --   --   --   --  2.6  --  2.2*  --   --   --   --   --  1.5*  --  2.9    < > = values in this interval not displayed.       CBC -   Recent Labs   Lab Test 01/05/25  0322 01/04/25  1636 01/04/25 0216   WBC 11.7* 13.7* 21.0*   HGB 7.3* 7.4* 7.3*   PLT 80* 94* 143*       LFTs -   Recent Labs   Lab Test 01/04/25  0216 01/03/25  0336 01/02/25 2024 "   ALKPHOS 66 54 59   BILITOTAL 0.7 0.5 0.9   * 65 62   * 165* 175*   PROTTOTAL 4.1* 4.1* 3.9*   ALBUMIN 2.4* 2.4* 2.3*       Iron Panel - No lab results found.      Imaging:  The radiologist's report which is filed     Current Medications:  Current Facility-Administered Medications   Medication Dose Route Frequency Provider Last Rate Last Admin    aspirin (ASA) chewable tablet 162 mg  162 mg Oral or Feeding Tube Daily Jovita Aleman APRN CNP   162 mg at 01/05/25 0811    baclofen (LIORESAL) tablet 10 mg  10 mg Oral or Feeding Tube Q8H Jovita Aleman APRN CNP   10 mg at 01/05/25 0810    chlorhexidine (PERIDEX) 0.12 % solution 15 mL  15 mL Mouth/Throat Q12H Stacey Torres MD   15 mL at 01/05/25 0810    heparin ANTICOAGULANT injection 5,000 Units  5,000 Units Subcutaneous Q8H Jovita Aleman APRN CNP   5,000 Units at 01/05/25 0810    [START ON 1/6/2025] insulin glargine (LANTUS PEN) injection 26 Units  26 Units Subcutaneous QAM AC Denny James PA-C        melatonin tablet 10 mg  10 mg Oral At Bedtime Justa Espino MD        multivitamins w/minerals liquid 15 mL  15 mL Per Feeding Tube Daily Sean Negron MD   15 mL at 01/05/25 0811    pantoprazole (PROTONIX) 2 mg/mL suspension 40 mg  40 mg Per Feeding Tube QAM Stacey Cantu MD        Or    pantoprazole (PROTONIX) IV push injection 40 mg  40 mg Intravenous QAM  Stacey Torres MD   40 mg at 01/05/25 0810    piperacillin-tazobactam (ZOSYN) intermittent infusion 4.5 g  4.5 g Intravenous Q6H Maria Luz Lozada  mL/hr at 01/05/25 1304 4.5 g at 01/05/25 1304    [START ON 1/6/2025] polyethylene glycol (MIRALAX) Packet 17 g  17 g Oral or Feeding Tube Daily Denny James PA-C        Prosource TF20 ENfit Compatibl EN LIQD (PROSOURCE TF20) packet 60 mL  1 packet Per Feeding Tube Daily Sean Negron MD   60 mL at 01/05/25 0817    rosuvastatin (CRESTOR) tablet 40 mg  40 mg Oral Daily Jovita Aleman APRN CNP   40 mg at 01/05/25 0810     senna-docusate (SENOKOT-S/PERICOLACE) 8.6-50 MG per tablet 1 tablet  1 tablet Oral or Feeding Tube BID Denny James PA-C        sertraline (ZOLOFT) tablet 200 mg  200 mg Oral Daily Saen Negron MD   200 mg at 01/05/25 0810    sodium chloride (PF) 0.9% PF flush 3 mL  3 mL Intracatheter Q8H Sonia Fajardo PA-C   3 mL at 01/05/25 0325     Current Facility-Administered Medications   Medication Dose Route Frequency Provider Last Rate Last Admin    dexmedeTOMIDine (PRECEDEX) 4 mcg/mL in sodium chloride 0.9 % 100 mL infusion  0.1-1.2 mcg/kg/hr (Dosing Weight) Intravenous Continuous ReyRenata MD 22.8 mL/hr at 01/05/25 1400 1.2 mcg/kg/hr at 01/05/25 1400    dextrose 10% infusion   Intravenous Continuous PRN Sean Negron MD        dextrose 10% infusion   Intravenous Continuous PRN Stacey Torres MD        HYDROmorphone (DILAUDID) 0.2 mg/mL infusion ADULT/PEDS GREATER than or EQUAL to 20 kg  0.3-0.6 mg/hr Intravenous Continuous Stacey Torres MD 1.5 mL/hr at 01/05/25 1400 0.3 mg/hr at 01/05/25 1400    insulin regular (MYXREDLIN) 1 unit/mL infusion  0-24 Units/hr Intravenous Continuous Stacey Torres MD 2 mL/hr at 01/05/25 1400 2 Units/hr at 01/05/25 1400    norepinephrine (LEVOPHED) 16 mg in  mL infusion MAX CONC CENTRAL LINE  0.01-0.6 mcg/kg/min (Dosing Weight) Intravenous Continuous Denny James PA-C 2.9 mL/hr at 01/05/25 1400 0.04 mcg/kg/min at 01/05/25 1400    Reason beta blocker order not selected   Does not apply DOES NOT GO TO Sonia Montalvo PA-C        vasopressin 1 unit/mL MAX Conc (PITRESSIN) infusion  2.4 Units/hr Intravenous Continuous Denny James PA-C 2.4 mL/hr at 01/05/25 1400 2.4 Units/hr at 01/05/25 1400     Jadiel Davidson MD

## 2025-01-05 NOTE — PROGRESS NOTES
Critical Care Attending Progress Note  I, Rosemarie Ocasio MD, PhD saw this patient with the DELIA and agree with the findings and plan of care as documented in the note. Please see separate note from today for further documentation.     I personally reviewed vital signs, medications, labs and imaging.    Assessment:   65 yo M w DM2, HTN, PVD, and neuropathic pain   1/2: s/p CABG x 4.   1/3: ECMO decan        Active problems and current treatments include:     Delirium: delirium precautions, melatonin qHS  Acute postop pain: multimodal analgesia, switch fentanyl gtt to hydromorphone gtt  Respiratory insufficiency: intubated, MV, FiO2 0.3, SBTs  Klebsiella pneumonia: ceftriaxone got broadened to Vanc/Zosyn, blood cx ngt, discontinue Vanc  Cardiogenic and vasogenic shock: discontinue dobutamine, follow up TTE  AUSTIN: improving SCr  ID: periop abx  Nutrition: TF at 20  Lines: PAC, eder, ca  PPX: heparin, PPI   DISPO: CVICU          I personally managed the ventilator, hemodynamics, sedation, analgesia, metabolic abnormalities and nutritional status.    I agree with the assessment and plan. I spent 43 minutes exclusive of procedures evaluating and managing this patient, discussing with the consultants, and updating the patient and family.     Rosemarie Ocasio MD, PhD

## 2025-01-06 ENCOUNTER — APPOINTMENT (OUTPATIENT)
Dept: GENERAL RADIOLOGY | Facility: CLINIC | Age: 65
DRG: 003 | End: 2025-01-06
Attending: STUDENT IN AN ORGANIZED HEALTH CARE EDUCATION/TRAINING PROGRAM
Payer: MEDICARE

## 2025-01-06 ENCOUNTER — APPOINTMENT (OUTPATIENT)
Dept: GENERAL RADIOLOGY | Facility: CLINIC | Age: 65
DRG: 003 | End: 2025-01-06
Payer: MEDICARE

## 2025-01-06 LAB
7AMINOCLONAZEPAM SERPL-MCNC: NEGATIVE NG/ML
ABO + RH BLD: NORMAL
ALLEN'S TEST: ABNORMAL
ALLEN'S TEST: ABNORMAL
ALPRAZ SERPL-MCNC: NEGATIVE NG/ML
ANION GAP SERPL CALCULATED.3IONS-SCNC: 7 MMOL/L (ref 7–15)
ANION GAP SERPL CALCULATED.3IONS-SCNC: 9 MMOL/L (ref 7–15)
ATRIAL RATE - MUSE: 75 BPM
ATRIAL RATE - MUSE: NORMAL BPM
BACTERIA SPT CULT: ABNORMAL
BACTERIA SPT CULT: ABNORMAL
BASE EXCESS BLDA CALC-SCNC: 3.7 MMOL/L (ref -3–3)
BASE EXCESS BLDA CALC-SCNC: 4 MMOL/L (ref -3–3)
BASE EXCESS BLDV CALC-SCNC: 3.5 MMOL/L (ref -3–3)
BASE EXCESS BLDV CALC-SCNC: 4.3 MMOL/L (ref -3–3)
BENZODIAZ SPEC QL: POSITIVE
BLD GP AB SCN SERPL QL: NEGATIVE
BLD PROD TYP BPU: NORMAL
BLOOD COMPONENT TYPE: NORMAL
BUN SERPL-MCNC: 42.3 MG/DL (ref 8–23)
BUN SERPL-MCNC: 47.3 MG/DL (ref 8–23)
CA-I BLD-MCNC: 4.5 MG/DL (ref 4.4–5.2)
CALCIUM SERPL-MCNC: 6.8 MG/DL (ref 8.8–10.4)
CALCIUM SERPL-MCNC: 7.6 MG/DL (ref 8.8–10.4)
CHLORDIAZEP SERPL-MCNC: NEGATIVE NG/ML
CHLORIDE SERPL-SCNC: 118 MMOL/L (ref 98–107)
CHLORIDE SERPL-SCNC: 121 MMOL/L (ref 98–107)
CLONAZEPAM SERPL-MCNC: NEGATIVE NG/ML
CODING SYSTEM: NORMAL
CREAT SERPL-MCNC: 0.96 MG/DL (ref 0.67–1.17)
CREAT SERPL-MCNC: 1.06 MG/DL (ref 0.67–1.17)
CROSSMATCH: NORMAL
DESALKYLFLURAZ SERPL CFM-MCNC: NEGATIVE NG/ML
DIASTOLIC BLOOD PRESSURE - MUSE: NORMAL MMHG
DIASTOLIC BLOOD PRESSURE - MUSE: NORMAL MMHG
DIAZEPAM SERPL-MCNC: NEGATIVE NG/ML
EGFRCR SERPLBLD CKD-EPI 2021: 78 ML/MIN/1.73M2
EGFRCR SERPLBLD CKD-EPI 2021: 88 ML/MIN/1.73M2
ERYTHROCYTE [DISTWIDTH] IN BLOOD BY AUTOMATED COUNT: 17.2 % (ref 10–15)
ERYTHROCYTE [DISTWIDTH] IN BLOOD BY AUTOMATED COUNT: 17.3 % (ref 10–15)
FLURAZEPAM SPEC-MCNC: NEGATIVE NG/ML
GLUCOSE BLDC GLUCOMTR-MCNC: 101 MG/DL (ref 70–99)
GLUCOSE BLDC GLUCOMTR-MCNC: 103 MG/DL (ref 70–99)
GLUCOSE BLDC GLUCOMTR-MCNC: 110 MG/DL (ref 70–99)
GLUCOSE BLDC GLUCOMTR-MCNC: 114 MG/DL (ref 70–99)
GLUCOSE BLDC GLUCOMTR-MCNC: 115 MG/DL (ref 70–99)
GLUCOSE BLDC GLUCOMTR-MCNC: 120 MG/DL (ref 70–99)
GLUCOSE BLDC GLUCOMTR-MCNC: 124 MG/DL (ref 70–99)
GLUCOSE BLDC GLUCOMTR-MCNC: 126 MG/DL (ref 70–99)
GLUCOSE BLDC GLUCOMTR-MCNC: 129 MG/DL (ref 70–99)
GLUCOSE BLDC GLUCOMTR-MCNC: 145 MG/DL (ref 70–99)
GLUCOSE BLDC GLUCOMTR-MCNC: 150 MG/DL (ref 70–99)
GLUCOSE BLDC GLUCOMTR-MCNC: 150 MG/DL (ref 70–99)
GLUCOSE BLDC GLUCOMTR-MCNC: 155 MG/DL (ref 70–99)
GLUCOSE BLDC GLUCOMTR-MCNC: 156 MG/DL (ref 70–99)
GLUCOSE BLDC GLUCOMTR-MCNC: 158 MG/DL (ref 70–99)
GLUCOSE SERPL-MCNC: 108 MG/DL (ref 70–99)
GLUCOSE SERPL-MCNC: 110 MG/DL (ref 70–99)
GRAM STAIN RESULT: ABNORMAL
GRAM STAIN RESULT: ABNORMAL
HCO3 BLD-SCNC: 28 MMOL/L (ref 21–28)
HCO3 BLD-SCNC: 28 MMOL/L (ref 21–28)
HCO3 BLDV-SCNC: 29 MMOL/L (ref 21–28)
HCO3 BLDV-SCNC: 29 MMOL/L (ref 21–28)
HCO3 SERPL-SCNC: 24 MMOL/L (ref 22–29)
HCO3 SERPL-SCNC: 25 MMOL/L (ref 22–29)
HCT VFR BLD AUTO: 20.8 % (ref 40–53)
HCT VFR BLD AUTO: 23.6 % (ref 40–53)
HGB BLD-MCNC: 6.6 G/DL (ref 13.3–17.7)
HGB BLD-MCNC: 7.7 G/DL (ref 13.3–17.7)
HGB BLD-MCNC: 8.4 G/DL (ref 13.3–17.7)
INTERPRETATION ECG - MUSE: NORMAL
INTERPRETATION ECG - MUSE: NORMAL
ISSUE DATE AND TIME: NORMAL
LACTATE SERPL-SCNC: 0.6 MMOL/L (ref 0.7–2)
LACTATE SERPL-SCNC: 0.8 MMOL/L (ref 0.7–2)
LORAZEPAM SERPL-MCNC: NEGATIVE NG/ML
Lab: NORMAL
MAGNESIUM SERPL-MCNC: 2.6 MG/DL (ref 1.7–2.3)
MCH RBC QN AUTO: 29.1 PG (ref 26.5–33)
MCH RBC QN AUTO: 30 PG (ref 26.5–33)
MCHC RBC AUTO-ENTMCNC: 31.7 G/DL (ref 31.5–36.5)
MCHC RBC AUTO-ENTMCNC: 32.6 G/DL (ref 31.5–36.5)
MCV RBC AUTO: 92 FL (ref 78–100)
MCV RBC AUTO: 92 FL (ref 78–100)
MIDAZOLAM SERPL-MCNC: 154.7 NG/ML
NORCHLORDIAZEP SERPL-MCNC: NEGATIVE UG/ML
NORDIAZEPAM SPEC-MCNC: NEGATIVE NG/ML
O2/TOTAL GAS SETTING VFR VENT: 30 %
OXAZEPAM SERPL CFM-MCNC: NEGATIVE NG/ML
OXYHGB MFR BLDA: 98 % (ref 92–100)
OXYHGB MFR BLDA: 98 % (ref 92–100)
OXYHGB MFR BLDV: 47 % (ref 70–75)
OXYHGB MFR BLDV: 50 % (ref 70–75)
P AXIS - MUSE: 50 DEGREES
P AXIS - MUSE: NORMAL DEGREES
PCO2 BLD: 37 MM HG (ref 35–45)
PCO2 BLD: 38 MM HG (ref 35–45)
PCO2 BLDV: 45 MM HG (ref 40–50)
PCO2 BLDV: 46 MM HG (ref 40–50)
PERFORMING LABORATORY: NORMAL
PH BLD: 7.47 [PH] (ref 7.35–7.45)
PH BLD: 7.49 [PH] (ref 7.35–7.45)
PH BLDV: 7.41 [PH] (ref 7.32–7.43)
PH BLDV: 7.42 [PH] (ref 7.32–7.43)
PHOSPHATE SERPL-MCNC: 3.6 MG/DL (ref 2.5–4.5)
PLATELET # BLD AUTO: 102 10E3/UL (ref 150–450)
PLATELET # BLD AUTO: 86 10E3/UL (ref 150–450)
PO2 BLD: 114 MM HG (ref 80–105)
PO2 BLD: 141 MM HG (ref 80–105)
PO2 BLDV: 29 MM HG (ref 25–47)
PO2 BLDV: 29 MM HG (ref 25–47)
POTASSIUM SERPL-SCNC: 3.1 MMOL/L (ref 3.4–5.3)
POTASSIUM SERPL-SCNC: 3.3 MMOL/L (ref 3.4–5.3)
POTASSIUM SERPL-SCNC: 3.7 MMOL/L (ref 3.4–5.3)
PR INTERVAL - MUSE: 142 MS
PR INTERVAL - MUSE: NORMAL MS
QRS DURATION - MUSE: 74 MS
QRS DURATION - MUSE: 76 MS
QT - MUSE: 438 MS
QT - MUSE: 492 MS
QTC - MUSE: 475 MS
QTC - MUSE: 549 MS
R AXIS - MUSE: 3 DEGREES
R AXIS - MUSE: 7 DEGREES
RBC # BLD AUTO: 2.27 10E6/UL (ref 4.4–5.9)
RBC # BLD AUTO: 2.57 10E6/UL (ref 4.4–5.9)
SAO2 % BLDA: 99.9 % (ref 96–97)
SAO2 % BLDA: >100 % (ref 96–97)
SAO2 % BLDV: 48.2 % (ref 70–75)
SAO2 % BLDV: 51.1 % (ref 70–75)
SODIUM SERPL-SCNC: 152 MMOL/L (ref 135–145)
SODIUM SERPL-SCNC: 152 MMOL/L (ref 135–145)
SPECIMEN EXP DATE BLD: NORMAL
SPECIMEN STATUS: NORMAL
SYSTOLIC BLOOD PRESSURE - MUSE: NORMAL MMHG
SYSTOLIC BLOOD PRESSURE - MUSE: NORMAL MMHG
T AXIS - MUSE: 135 DEGREES
T AXIS - MUSE: 88 DEGREES
TEMAZEPAM SERPL-MCNC: NEGATIVE NG/ML
TEST NAME: NORMAL
TRIAZOLAM SPEC-MCNC: NEGATIVE NG/ML
UNIT ABO/RH: NORMAL
UNIT NUMBER: NORMAL
UNIT STATUS: NORMAL
UNIT TYPE ISBT: 6200
VENTRICULAR RATE- MUSE: 71 BPM
VENTRICULAR RATE- MUSE: 75 BPM
WBC # BLD AUTO: 10.3 10E3/UL (ref 4–11)
WBC # BLD AUTO: 9.9 10E3/UL (ref 4–11)

## 2025-01-06 PROCEDURE — 999N000065 XR ABDOMEN PORT 1 VIEW

## 2025-01-06 PROCEDURE — 71045 X-RAY EXAM CHEST 1 VIEW: CPT

## 2025-01-06 PROCEDURE — 250N000011 HC RX IP 250 OP 636: Performed by: STUDENT IN AN ORGANIZED HEALTH CARE EDUCATION/TRAINING PROGRAM

## 2025-01-06 PROCEDURE — 94003 VENT MGMT INPAT SUBQ DAY: CPT

## 2025-01-06 PROCEDURE — 82805 BLOOD GASES W/O2 SATURATION: CPT | Performed by: NURSE PRACTITIONER

## 2025-01-06 PROCEDURE — 258N000003 HC RX IP 258 OP 636: Mod: JZ | Performed by: STUDENT IN AN ORGANIZED HEALTH CARE EDUCATION/TRAINING PROGRAM

## 2025-01-06 PROCEDURE — 82805 BLOOD GASES W/O2 SATURATION: CPT

## 2025-01-06 PROCEDURE — 86850 RBC ANTIBODY SCREEN: CPT | Performed by: STUDENT IN AN ORGANIZED HEALTH CARE EDUCATION/TRAINING PROGRAM

## 2025-01-06 PROCEDURE — 80051 ELECTROLYTE PANEL: CPT | Performed by: NURSE PRACTITIONER

## 2025-01-06 PROCEDURE — 82330 ASSAY OF CALCIUM: CPT | Performed by: PHYSICIAN ASSISTANT

## 2025-01-06 PROCEDURE — 250N000011 HC RX IP 250 OP 636: Performed by: NURSE PRACTITIONER

## 2025-01-06 PROCEDURE — 83735 ASSAY OF MAGNESIUM: CPT | Performed by: STUDENT IN AN ORGANIZED HEALTH CARE EDUCATION/TRAINING PROGRAM

## 2025-01-06 PROCEDURE — 250N000009 HC RX 250: Performed by: STUDENT IN AN ORGANIZED HEALTH CARE EDUCATION/TRAINING PROGRAM

## 2025-01-06 PROCEDURE — 250N000013 HC RX MED GY IP 250 OP 250 PS 637: Performed by: STUDENT IN AN ORGANIZED HEALTH CARE EDUCATION/TRAINING PROGRAM

## 2025-01-06 PROCEDURE — 85014 HEMATOCRIT: CPT | Performed by: NURSE PRACTITIONER

## 2025-01-06 PROCEDURE — 250N000012 HC RX MED GY IP 250 OP 636 PS 637

## 2025-01-06 PROCEDURE — 85027 COMPLETE CBC AUTOMATED: CPT | Performed by: NURSE PRACTITIONER

## 2025-01-06 PROCEDURE — 85018 HEMOGLOBIN: CPT | Performed by: STUDENT IN AN ORGANIZED HEALTH CARE EDUCATION/TRAINING PROGRAM

## 2025-01-06 PROCEDURE — 999N000157 HC STATISTIC RCP TIME EA 10 MIN

## 2025-01-06 PROCEDURE — 80048 BASIC METABOLIC PNL TOTAL CA: CPT | Performed by: NURSE PRACTITIONER

## 2025-01-06 PROCEDURE — 82310 ASSAY OF CALCIUM: CPT | Performed by: NURSE PRACTITIONER

## 2025-01-06 PROCEDURE — 71045 X-RAY EXAM CHEST 1 VIEW: CPT | Mod: 26 | Performed by: STUDENT IN AN ORGANIZED HEALTH CARE EDUCATION/TRAINING PROGRAM

## 2025-01-06 PROCEDURE — 250N000013 HC RX MED GY IP 250 OP 250 PS 637

## 2025-01-06 PROCEDURE — 200N000002 HC R&B ICU UMMC

## 2025-01-06 PROCEDURE — 85041 AUTOMATED RBC COUNT: CPT | Performed by: NURSE PRACTITIONER

## 2025-01-06 PROCEDURE — 99291 CRITICAL CARE FIRST HOUR: CPT | Mod: 24 | Performed by: STUDENT IN AN ORGANIZED HEALTH CARE EDUCATION/TRAINING PROGRAM

## 2025-01-06 PROCEDURE — 86900 BLOOD TYPING SEROLOGIC ABO: CPT | Performed by: STUDENT IN AN ORGANIZED HEALTH CARE EDUCATION/TRAINING PROGRAM

## 2025-01-06 PROCEDURE — 82565 ASSAY OF CREATININE: CPT | Performed by: NURSE PRACTITIONER

## 2025-01-06 PROCEDURE — 250N000009 HC RX 250

## 2025-01-06 PROCEDURE — 44500 INTRO GASTROINTESTINAL TUBE: CPT | Performed by: DIETITIAN, REGISTERED

## 2025-01-06 PROCEDURE — 84132 ASSAY OF SERUM POTASSIUM: CPT | Performed by: STUDENT IN AN ORGANIZED HEALTH CARE EDUCATION/TRAINING PROGRAM

## 2025-01-06 PROCEDURE — 250N000013 HC RX MED GY IP 250 OP 250 PS 637: Performed by: PHYSICIAN ASSISTANT

## 2025-01-06 PROCEDURE — P9016 RBC LEUKOCYTES REDUCED: HCPCS | Performed by: STUDENT IN AN ORGANIZED HEALTH CARE EDUCATION/TRAINING PROGRAM

## 2025-01-06 PROCEDURE — 74018 RADEX ABDOMEN 1 VIEW: CPT | Mod: 26 | Performed by: RADIOLOGY

## 2025-01-06 PROCEDURE — 86923 COMPATIBILITY TEST ELECTRIC: CPT | Performed by: STUDENT IN AN ORGANIZED HEALTH CARE EDUCATION/TRAINING PROGRAM

## 2025-01-06 PROCEDURE — 84100 ASSAY OF PHOSPHORUS: CPT

## 2025-01-06 PROCEDURE — 999N000248 HC STATISTIC IV INSERT WITH US BY RN

## 2025-01-06 PROCEDURE — 250N000013 HC RX MED GY IP 250 OP 250 PS 637: Performed by: NURSE PRACTITIONER

## 2025-01-06 PROCEDURE — 250N000011 HC RX IP 250 OP 636

## 2025-01-06 PROCEDURE — 83605 ASSAY OF LACTIC ACID: CPT | Performed by: STUDENT IN AN ORGANIZED HEALTH CARE EDUCATION/TRAINING PROGRAM

## 2025-01-06 RX ORDER — FUROSEMIDE 10 MG/ML
40 INJECTION INTRAMUSCULAR; INTRAVENOUS ONCE
Status: COMPLETED | OUTPATIENT
Start: 2025-01-06 | End: 2025-01-06

## 2025-01-06 RX ORDER — POTASSIUM CHLORIDE 1.5 G/1.58G
40 POWDER, FOR SOLUTION ORAL ONCE
Status: COMPLETED | OUTPATIENT
Start: 2025-01-06 | End: 2025-01-06

## 2025-01-06 RX ORDER — POTASSIUM CHLORIDE 1.5 G/1.58G
20 POWDER, FOR SOLUTION ORAL ONCE
Status: COMPLETED | OUTPATIENT
Start: 2025-01-06 | End: 2025-01-06

## 2025-01-06 RX ORDER — METOLAZONE 5 MG/1
5 TABLET ORAL ONCE
Status: COMPLETED | OUTPATIENT
Start: 2025-01-06 | End: 2025-01-06

## 2025-01-06 RX ORDER — LIDOCAINE HYDROCHLORIDE 20 MG/ML
JELLY TOPICAL ONCE
Status: COMPLETED | OUTPATIENT
Start: 2025-01-06 | End: 2025-01-06

## 2025-01-06 RX ORDER — LIDOCAINE HYDROCHLORIDE 20 MG/ML
5 SOLUTION OROPHARYNGEAL ONCE
Status: COMPLETED | OUTPATIENT
Start: 2025-01-06 | End: 2025-01-06

## 2025-01-06 RX ORDER — METOCLOPRAMIDE HYDROCHLORIDE 5 MG/ML
10 INJECTION INTRAMUSCULAR; INTRAVENOUS ONCE
Status: COMPLETED | OUTPATIENT
Start: 2025-01-06 | End: 2025-01-06

## 2025-01-06 RX ORDER — POTASSIUM CHLORIDE 1.5 G/1.58G
40 POWDER, FOR SOLUTION ORAL 2 TIMES DAILY
Status: COMPLETED | OUTPATIENT
Start: 2025-01-06 | End: 2025-01-06

## 2025-01-06 RX ORDER — FENTANYL CITRATE 50 UG/ML
25 INJECTION, SOLUTION INTRAMUSCULAR; INTRAVENOUS ONCE
Status: COMPLETED | OUTPATIENT
Start: 2025-01-06 | End: 2025-01-06

## 2025-01-06 RX ORDER — PROPOFOL 10 MG/ML
INJECTION, EMULSION INTRAVENOUS
Status: COMPLETED
Start: 2025-01-06 | End: 2025-01-06

## 2025-01-06 RX ORDER — PROPOFOL 10 MG/ML
5-75 INJECTION, EMULSION INTRAVENOUS CONTINUOUS
Status: DISCONTINUED | OUTPATIENT
Start: 2025-01-06 | End: 2025-01-07

## 2025-01-06 RX ADMIN — BACLOFEN 10 MG: 10 TABLET ORAL at 08:10

## 2025-01-06 RX ADMIN — DEXMEDETOMIDINE HYDROCHLORIDE 1 MCG/KG/HR: 4 INJECTION, SOLUTION INTRAVENOUS at 11:00

## 2025-01-06 RX ADMIN — POTASSIUM CHLORIDE 40 MEQ: 1.5 POWDER, FOR SOLUTION ORAL at 20:16

## 2025-01-06 RX ADMIN — INSULIN HUMAN 2 UNITS/HR: 1 INJECTION, SOLUTION INTRAVENOUS at 05:51

## 2025-01-06 RX ADMIN — Medication 10 MG: at 22:16

## 2025-01-06 RX ADMIN — ROSUVASTATIN CALCIUM 40 MG: 20 TABLET, FILM COATED ORAL at 08:10

## 2025-01-06 RX ADMIN — POTASSIUM CHLORIDE 20 MEQ: 1.5 POWDER, FOR SOLUTION ORAL at 04:54

## 2025-01-06 RX ADMIN — HYDROXYZINE HYDROCHLORIDE 50 MG: 50 TABLET ORAL at 16:54

## 2025-01-06 RX ADMIN — PIPERACILLIN SODIUM AND TAZOBACTAM SODIUM 4.5 G: 4; .5 INJECTION, SOLUTION INTRAVENOUS at 05:42

## 2025-01-06 RX ADMIN — FUROSEMIDE 40 MG: 10 INJECTION, SOLUTION INTRAMUSCULAR; INTRAVENOUS at 14:59

## 2025-01-06 RX ADMIN — INSULIN GLARGINE 13 UNITS: 100 INJECTION, SOLUTION SUBCUTANEOUS at 11:28

## 2025-01-06 RX ADMIN — SERTRALINE HYDROCHLORIDE 200 MG: 100 TABLET ORAL at 08:10

## 2025-01-06 RX ADMIN — CHLORHEXIDINE GLUCONATE 15 ML: 1.2 SOLUTION ORAL at 20:10

## 2025-01-06 RX ADMIN — DEXMEDETOMIDINE HYDROCHLORIDE 1.2 MCG/KG/HR: 4 INJECTION, SOLUTION INTRAVENOUS at 02:51

## 2025-01-06 RX ADMIN — ASPIRIN 81 MG CHEWABLE TABLET 162 MG: 81 TABLET CHEWABLE at 08:10

## 2025-01-06 RX ADMIN — PIPERACILLIN SODIUM AND TAZOBACTAM SODIUM 4.5 G: 4; .5 INJECTION, SOLUTION INTRAVENOUS at 11:28

## 2025-01-06 RX ADMIN — FENTANYL CITRATE 100 MCG: 50 INJECTION, SOLUTION INTRAMUSCULAR; INTRAVENOUS at 17:30

## 2025-01-06 RX ADMIN — PANTOPRAZOLE SODIUM 40 MG: 40 INJECTION, POWDER, FOR SOLUTION INTRAVENOUS at 08:11

## 2025-01-06 RX ADMIN — BACLOFEN 10 MG: 10 TABLET ORAL at 16:18

## 2025-01-06 RX ADMIN — HYDROXYZINE HYDROCHLORIDE 50 MG: 50 TABLET ORAL at 05:41

## 2025-01-06 RX ADMIN — VASOPRESSIN 2.4 UNITS/HR: 20 INJECTION, SOLUTION INTRAMUSCULAR; SUBCUTANEOUS at 05:00

## 2025-01-06 RX ADMIN — PIPERACILLIN SODIUM AND TAZOBACTAM SODIUM 4.5 G: 4; .5 INJECTION, SOLUTION INTRAVENOUS at 00:03

## 2025-01-06 RX ADMIN — DEXMEDETOMIDINE HYDROCHLORIDE 1.2 MCG/KG/HR: 4 INJECTION, SOLUTION INTRAVENOUS at 07:04

## 2025-01-06 RX ADMIN — PROPOFOL 10 MCG/KG/MIN: 10 INJECTION, EMULSION INTRAVENOUS at 17:54

## 2025-01-06 RX ADMIN — POTASSIUM CHLORIDE 40 MEQ: 1.5 POWDER, FOR SOLUTION ORAL at 08:10

## 2025-01-06 RX ADMIN — POTASSIUM CHLORIDE 20 MEQ: 1.5 POWDER, FOR SOLUTION ORAL at 23:50

## 2025-01-06 RX ADMIN — OLANZAPINE 5 MG: 5 TABLET, ORALLY DISINTEGRATING ORAL at 08:10

## 2025-01-06 RX ADMIN — BACLOFEN 10 MG: 10 TABLET ORAL at 23:50

## 2025-01-06 RX ADMIN — HEPARIN SODIUM 5000 UNITS: 5000 INJECTION, SOLUTION INTRAVENOUS; SUBCUTANEOUS at 23:54

## 2025-01-06 RX ADMIN — LIDOCAINE HYDROCHLORIDE: 20 JELLY TOPICAL at 13:16

## 2025-01-06 RX ADMIN — PIPERACILLIN SODIUM AND TAZOBACTAM SODIUM 4.5 G: 4; .5 INJECTION, SOLUTION INTRAVENOUS at 18:24

## 2025-01-06 RX ADMIN — HEPARIN SODIUM 5000 UNITS: 5000 INJECTION, SOLUTION INTRAVENOUS; SUBCUTANEOUS at 16:16

## 2025-01-06 RX ADMIN — OXYCODONE HYDROCHLORIDE 5 MG: 5 TABLET ORAL at 05:41

## 2025-01-06 RX ADMIN — Medication 15 ML: at 08:11

## 2025-01-06 RX ADMIN — METOLAZONE 5 MG: 5 TABLET ORAL at 08:10

## 2025-01-06 RX ADMIN — FUROSEMIDE 40 MG: 10 INJECTION, SOLUTION INTRAMUSCULAR; INTRAVENOUS at 08:11

## 2025-01-06 RX ADMIN — HEPARIN SODIUM 5000 UNITS: 5000 INJECTION, SOLUTION INTRAVENOUS; SUBCUTANEOUS at 08:11

## 2025-01-06 RX ADMIN — POTASSIUM CHLORIDE 40 MEQ: 1.5 POWDER, FOR SOLUTION ORAL at 16:18

## 2025-01-06 RX ADMIN — Medication 60 ML: at 08:12

## 2025-01-06 RX ADMIN — BACLOFEN 10 MG: 10 TABLET ORAL at 00:03

## 2025-01-06 RX ADMIN — CHLORHEXIDINE GLUCONATE 15 ML: 1.2 SOLUTION ORAL at 08:10

## 2025-01-06 RX ADMIN — OLANZAPINE 5 MG: 5 TABLET, ORALLY DISINTEGRATING ORAL at 16:43

## 2025-01-06 RX ADMIN — HEPARIN SODIUM 5000 UNITS: 5000 INJECTION, SOLUTION INTRAVENOUS; SUBCUTANEOUS at 00:03

## 2025-01-06 RX ADMIN — PIPERACILLIN SODIUM AND TAZOBACTAM SODIUM 4.5 G: 4; .5 INJECTION, SOLUTION INTRAVENOUS at 23:58

## 2025-01-06 RX ADMIN — DEXMEDETOMIDINE HYDROCHLORIDE 1.2 MCG/KG/HR: 4 INJECTION, SOLUTION INTRAVENOUS at 21:40

## 2025-01-06 ASSESSMENT — ACTIVITIES OF DAILY LIVING (ADL)
ADLS_ACUITY_SCORE: 55
ADLS_ACUITY_SCORE: 51
ADLS_ACUITY_SCORE: 55
ADLS_ACUITY_SCORE: 59
ADLS_ACUITY_SCORE: 55
ADLS_ACUITY_SCORE: 55
ADLS_ACUITY_SCORE: 59
ADLS_ACUITY_SCORE: 55
ADLS_ACUITY_SCORE: 59
ADLS_ACUITY_SCORE: 55

## 2025-01-06 NOTE — PROCEDURES
Small Bowel Feeding Tube Placement Assessment  Reason for Feeding Tube Placement: post pyloric enteral access for nutrition and medication administration  Cortrak Start Time: 1200   Cortrak End Time: 1220  Medicine Delivered During Procedure: 2% viscous lidocaine gel   Placement Successful: yes per Cortrak tracing pending AXR confirmation      Procedure Complications: patient agitated during placement , difficulty passing midline, and kinking and/or coiling of feeding tube  Final Placement Matty at exit of nare:  100 cm  Face to Face time with patient: 25 minutes    Bridle Placement:   Reason for bridle placement: securement of nasoenteric feeding tube    Medicine delivered during procedure: lidocaine gel   Procedure: Successful   Location of top of clip on FT: @ 102 cm marker   Condition of nose/skin at time of bridle placement: Unremarkable   Face to Face time with patient: < 5 minutes.    Liz Garcia RD, LD  Available on Vocera - can search by name or unit Dietitian  **Clinical Nutrition is no longer available via pager

## 2025-01-06 NOTE — PROGRESS NOTES
[facility]    ICU Progress Note       Date of Admission:  12/31/2024    Assessment: Critical Care   Erwin Aguilar is a 64 year old male with a past medical history of DM2, HTN, PVD, and neuropathic pain who initially presented on 12/31/24 to the ED with arm pain, vomiting, and diarrhea; ACS and ST depression. He subsequently had VT arrest with 1 round of CPR, epinephrine, defibrillation. After ROSC had afib with RVR. Became hypotensive and was subsequently cannulated for VA ECMO on 1/2/25, s/p CABG x4, and arrived to the ICU at that time. He was de cannulated on 1/3/25 at bedside. ICU course complicated by large R ptx seen on chest CT on 1/4/25, CVTS Fellow placed chest tube at bedside with improvement of pneumothorax.       MAJOR CHANGES/UPDATES  - ON pt's Hgb dropped to 6.6, given 1 unit of pRBCs, recheck this morning 8.4  - Diurese with fluid goal net -1.0 to -2.0 L with lasix 40 mg and metolazone 5 mg in setting of hypernatremia  - Increase FWF to 200 ml q 4 hr for hypernatremia  - Nutrition consult placed for post-pyloric feeding tube   - Remove PA catheter, A-line  - Discuss with CVTS about pulling epicardial pacer wires     Plan: Critical Care     PLAN:  Neuro/ pain/ sedation:  # Acute postoperative pain  # Chronic pain with opioid dependence  # Neuropathy, PTA  # Sedation while on mechanical ventilation   # Agitation  # Delrium   Analgesia   - Gtt Dilaudid wean as able   - Scheduled: baclofen    - PRN: oxycodone  Sedation    - Gtt: Precedex gtt wean as able   - Scheduled: Melaontin 10 mg at bedtime    - PRN: hydroxyzine, olanzapine  RASS goal 0 to -1     # Generalized Anxiety, PTA   - Continue PTA Sertraline  - Restart PTA PRN hydroxyzine      # Encephalopathy of unclear etiology   - Prior to procedure  - 1/1 vEEG without seizure activity (severe encephalopathy) and 12/31 and 1/4 CTH negative for acute findings.     Pulmonary care:   # Acute Hypoxic Respiratory  failure   # Respiratory alkalosis 2:2 agitation - improved   # R tension pneumothorax   # Aspiration Pneumonia, Klebsiella  FiO2 (%): 30 %, Resp: 18, Vent Mode: CMV/AC, Resp Rate (Set): 14 breaths/min, Tidal Volume (Set, mL): 400 mL, PEEP (cm H2O): 8 cmH2O, Pressure Support (cm H2O): 7 cmH2O, Resp Rate (Set): 14 breaths/min, Tidal Volume (Set, mL): 400 mL, PEEP (cm H2O): 8 cmH2O   - ON of 1/4 CT C/A/P ordered for persistently elevated lactate which revealed large R tension pneumothorax. CVTS notified and fellow placed chest tube at bedside this morning with improvement of pneumothorax.   - Pt's tachypnea has been improving with optimized sedation/analgesia plan, pH 7.47, PaO2 141, PCo2 38, HCO3 28.   - Wean vent as able  - VAP bundle while intubated  - Goal SpO2 > 92%  - CXR on arrival, then daily   - CXR this morning, slightly improved on the L but increased opacities on the R  - Monitor CT output   - Please see ID for abx plan     Cardiovascular:    # Mixed Shock, cardiogenic, vasoplegic  # HTN, PTA   # VT Cardiac Arrest on VA ECMO (01/01/24 - 1/3)  # CAD s/p CABG x4  # Dyslipidemia, PTA   # Cardiomyopathy   1/5 TTE Normal LVEF 55-60%, normal RV function   - Monitor hemodynamics closely. Goal MAP >65, SBP <140   - NE for pressor support, wean as able off of  this morning will discontinue  - Aspirin 81mg daily   - Crestor 40mg daily  - A/V wires back up rate at 60, not currently paced will discuss with CVTS about removing  - Hold PTA apixaban, Dyazide, propanolol     GI care / Nutrition:   # At risk for protein calorie malnutrition   # Constipation   - Diet: Place NJ tube and advance TF to goal   - PPI  - Last BM: 1/5. Bowel reg ordered      Renal / Fluids / Electrolytes:   # Acute Kidney Injury 2/2 ATN - improving   # Hypokalemia   # Hypernatremia  # Hypervolemia    BL creat appears to be ~ 0.6-0.8  - Diurese with fluid goal net -1.0 to -2.0 L with lasix 40 mg and metolazone 5 mg in setting of hypernatremia  -  Scheduled 40 mEQ of KCl q 12 hr x2 doses with diuresis   - Cr this morning down to down to 1.06 this morning   - Strict I/O, daily weights, avoid/limit nephrotoxins  - Pt's K+ this morning 3.7, replaced, recheck this afternoon   - Na+ up to 152 difficult to assess mental status, will continue to monitor, FWF increased to 200 q 4hr   - Replete lytes PRN per protocol        Endocrine:    # Stress hyperglycemia  # Type 2 Diabetes Mellitus, PTA   - Preop A1c 5.7  - Insulin gtt  - Goal BG <180 for optimal healing  - Will start half of PTA Lantus 13 units    ID / Antibiotics:  # Stress induced leukocytosis  # Aspiration Pneumonia, Klebsiella  - Broadened abx from CTX (12/31 to 1/4) to zosyn 1/4/24 EOT    - Monitor fever curve, WBC, and inflammatory markers as appropriate  - Pt afebrile with T max of 99.6, no leukocytosis with WBC of 10.3 down from 13.9     Cultures  1/4 UA non-infectious appearing negative LE, nitrites, WBCs, and bacteria   1/4 Sputum cx gram stain 1+ Klebsiella pneumoniae   1/3 blood cx NGTD @ 2 day   12/31 sputum: Klebsiella pneumonia with resistance to ampicillin   12/31 MRSA and blood cultures negative     Heme:     # Acute blood loss anemia  # Acute thrombocytopenia  No s/sx active bleeding  - ON pt's Hgb dropped to 6.6, given 1 unit of pRBCs, recheck this morning 8.4  - Trending CBC   - Hgb goal > 7.0, transfuse PRN   - DVT ppx: SQH      MSK / Skin:  # Sternotomy   # Surgical Incision  - Sternal precautions, postop incision management protocol  - PT/OT/CR    #PVD, PTA   - S/p right BKA, 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 All questions were answered. They are in agreement with plan.      Patient  "seen, findings and plan discussed with CVICU staff and CVTS staff.     Total Critical Care time spent by me, excluding procedures, was 45 minutes.        Denny James PA-C  [facility]  Securely message with Vocera (more info)  Text page via [employer] Paging/Directory     Clinically Significant Risk Factors         # Hypernatremia: Highest Na = 152 mmol/L in last 2 days, will monitor as appropriate  # Hyperchloremia: Highest Cl = 118 mmol/L in last 2 days, will monitor as appropriate      # Hypocalcemia: Lowest iCa = 4 mg/dL in last 2 days, will monitor and replace as appropriate     # Hypoalbuminemia: Lowest albumin = 2 g/dL at 1/2/2025  5:22 PM, will monitor as appropriate     # Thrombocytopenia: Lowest platelets = 80 in last 2 days, will monitor for bleeding   # Hypertension: Noted on problem list     # Acute Hypoxic Respiratory Failure: Documented O2 saturation < 90%. Continue supplemental oxygen as needed  # Acute Hypercapnic Respiratory Failure: based on arterial blood gas results.  Continue supplemental oxygen and ventilatory support as indicated.  # Acute Hypercapnic Respiratory Failure: based on venous blood gas results.  Continue supplemental oxygen and ventilatory support as indicated.         # Overweight: Estimated body mass index is 27.95 kg/m  as calculated from the following:    Height as of this encounter: 1.803 m (5' 11\").    Weight as of this encounter: 90.9 kg (200 lb 6.4 oz).      # Asthma: noted on problem list    # History of CABG: noted on surgical history       ____________________________________________________________________    Interval History   Unable to obtain due to pt's critical condition     Intake/Output  I/O last 3 completed shifts:  In: 3138.93 [I.V.:1263.93; NG/GT:935; IV Piggyback:500]  Out: 2151 [Urine:1131; Stool:700; Chest Tube:320]      Physical Exam   Vital Signs: Temp: 98.3  F (36.8  C) Temp src: Axillary BP: 111/55 Pulse: 63   " Resp: 18 SpO2: 100 % O2 Device: Mechanical Ventilator    Weight: 200 lbs 6.37 oz    Neuro: Intubated. Sedated. Eyes open intermittently doesn't track. All extremities move spontaneously. Doesn't follow commands.  HEENT: Normocephalic, atraumatic. PERRL, and nonicteric.   CV: RRR on monitor, S1S2, all extremities well perfused   Respiratory: Normal respiratory effort on CMV RR 14, , PEEP 8 , FiO2 30 %. Equal chest rise b/l. Mediastinal chest tubes with 240 ml of serosanguinous output/24 hours without airleak noted. R pleural with 80 ml of serosanguinous output no airleak noted.    GI: Abdomen soft. Non-distended.   : Voiding with Rodriguez.   MSK: Right BKA. Moves all extremities well with normal appearing strength. Sensation intact in all upper/lower extremities.     Skin: Sternal incision open to air, no drainage, surrounding erythema or induration noted. Large hematoma on L groin near previous ECMO cannulation site.     Data   I reviewed all medications, new labs and imaging results over the last 24 hours.  Arterial Blood Gases   Recent Labs   Lab 01/06/25  0323 01/06/25  0014 01/05/25  1953 01/05/25  1423   PH 7.47* 7.49* 7.51* 7.54*   PCO2 38 37 35 32*   PO2 141* 114* 144* 109*   HCO3 28 28 28 27       Complete Blood Count   Recent Labs   Lab 01/06/25  1001 01/06/25  0322 01/05/25  2037 01/05/25  1553 01/05/25  0322 01/04/25  1636   WBC  --  10.3  --  13.9* 11.7* 13.7*   HGB 8.4* 6.6* 7.3* 7.2* 7.3* 7.4*   PLT  --  86*  --  93* 80* 94*       Basic Metabolic Panel  Recent Labs   Lab 01/06/25  1130 01/06/25  1001 01/06/25  0806 01/06/25  0624 01/06/25  0345 01/06/25  0322 01/05/25  2104 01/05/25  2037 01/05/25  1615 01/05/25  1553 01/05/25  0421 01/05/25  0322 01/04/25  1644 01/04/25  1636   NA  --   --   --   --   --  152*  --  151*  --  152*  --  148*  --  148*   POTASSIUM  --   --   --   --   --  3.7  --   --   --  3.5  --  3.6  --  3.7   CHLORIDE  --   --   --   --   --  118*  --   --   --  117*  --  114*   --  114*   CO2  --   --   --   --   --  25  --   --   --  24  --  25  --  24   BUN  --   --   --   --   --  47.3*  --   --   --  47.7*  --  44.7*  --  45.2*   CR  --   --   --   --   --  1.06  --   --   --  1.24*  --  1.31*  --  1.39*   * 145* 115* 156*   < > 108*   < >  --    < > 165*   < > 152*   < > 117*    < > = values in this interval not displayed.       Liver Function Tests  Recent Labs   Lab 01/04/25  0216 01/03/25 0336 01/02/25 2155 01/02/25 2024 01/02/25 1724 01/02/25  1722   * 165*  --  175*  --  112*   * 65  --  62  --  33   ALKPHOS 66 54  --  59  --  52   BILITOTAL 0.7 0.5  --  0.9  --  0.8   ALBUMIN 2.4* 2.4*  --  2.3*  --  2.0*   INR  --  1.29* 1.42* 1.43* 1.59*  --        Pancreatic Enzymes  Recent Labs   Lab 12/31/24  0752   LIPASE 10*       Coagulation Profile  Recent Labs   Lab 01/03/25  1124 01/03/25 0336 01/02/25 2155 01/02/25 2024 01/02/25  1724   INR  --  1.29* 1.42* 1.43* 1.59*   PTT 34 35 39* 47* 71*       IMAGING:  Recent Results (from the past 24 hours)   XR Chest Port 1 View    Narrative    Exam: XR CHEST PORT 1 VIEW, 1/6/2025 2:13 AM    Indication: R pneumothorax.    Comparison: 1/5/2025.    Findings:   Portable semiupright AP views of the chest. Endotracheal tube tip  projects 6.0 cm above the edy. Gastric tube courses below the  diaphragm with tip out of the field of view. Right internal jugular  Ham[place]-Bala catheter tip projects over the distal main pulmonary artery.  Stable positioning of mediastinal drains. Stable positioning of the  right apically oriented chest catheter. Left basilar oriented chest  catheter. Epicardial stimulator leads. Postoperative changes of the  chest with intact median sternotomy wires, mediastinal surgical clips,  and left atrial appendage clip. Trachea is midline. Cardiomediastinal  silhouette is within normal limits. No focal airspace opacity. Similar  streaky perihilar and bibasilar opacities. No appreciable residual  right  pneumothorax. No pleural effusion. Visualized portions of the  upper abdomen are unremarkable. No acute osseous abnormality.  Subcutaneous emphysema tracking along the superior right chest wall.      Impression    Impression:  1. No appreciable residual right pneumothorax with stable right chest  catheter positioning.  2. Similar mild pulmonary edema and atelectasis.  3. Similar subcutaneous emphysema tracking along the superior right  chest wall.    I have personally reviewed the examination and initial interpretation  and I agree with the findings.    AMY LAU DO         SYSTEM ID:  [id number]

## 2025-01-06 NOTE — PLAN OF CARE
Major Shift Events: BP labile, on/off levo overnight. Gets hypertensive whenever awakened and difficult to calm/re-sedate. Dilaudid gtt with boluses. Maxed on precedex.   OGLESBY, does not follow commands or track. 1 uRBC for hgb of 6.6.  NADEEN at 0400: CVP 9, PAP 28/14, SVO2 47, CI 2.6, SVR 1675.  Plan: Per CTICU  For vital signs and complete assessments, please see documentation flowsheets.

## 2025-01-06 NOTE — PROGRESS NOTES
CLINICAL NUTRITION SERVICES - BRIEF NOTE      Reason for RD note: Provider order for ppFT placement.    New Findings/Chart Review:  OGT feeds @ 20 mL/hr over weekend. No plan to extubate today, so writer placed ppFT per consult.     Interventions:  CVICU rounds    FT placement    Ordered EN support via NDT:  GOAL: Pivot 1.5 Lalito (or equivalent) @ goal of  55ml/hr  (1320ml/day) + 1 Prosource TF20 provides: 2060 kcals (26 kcal/kg), 144 g PRO (1.8 g pro/kg), 990 ml free H20, 227 g CHO, and 9 g fiber daily.   Once FT verified ok to use per primary team, advance to 35 mL/hr and adv by 10 mL q8h to goal if tolerated.  Do not advance TF rate unless Mg++ > 1.5, K+ is > 3, and phos > 1.9  FWF per primary team - currently at 200 mL q4h   Continue multivitamin/minerals to help ensure micronutrient needs being met with suspected hypermetabolic demands and potential interruptions to TF infusions.     Nutrition will continue to follow per protocol.    Liz Garcia RD, LD  Available on [employer] - can search by name or unit Dietitian  **Clinical Nutrition is no longer available via pager

## 2025-01-07 ENCOUNTER — APPOINTMENT (OUTPATIENT)
Dept: OCCUPATIONAL THERAPY | Facility: CLINIC | Age: 65
DRG: 003 | End: 2025-01-07
Attending: PHYSICIAN ASSISTANT
Payer: MEDICARE

## 2025-01-07 ENCOUNTER — APPOINTMENT (OUTPATIENT)
Dept: CT IMAGING | Facility: CLINIC | Age: 65
DRG: 003 | End: 2025-01-07
Attending: STUDENT IN AN ORGANIZED HEALTH CARE EDUCATION/TRAINING PROGRAM
Payer: MEDICARE

## 2025-01-07 ENCOUNTER — APPOINTMENT (OUTPATIENT)
Dept: GENERAL RADIOLOGY | Facility: CLINIC | Age: 65
DRG: 003 | End: 2025-01-07
Attending: STUDENT IN AN ORGANIZED HEALTH CARE EDUCATION/TRAINING PROGRAM
Payer: MEDICARE

## 2025-01-07 LAB
ANION GAP SERPL CALCULATED.3IONS-SCNC: 10 MMOL/L (ref 7–15)
ANION GAP SERPL CALCULATED.3IONS-SCNC: 9 MMOL/L (ref 7–15)
ANION GAP SERPL CALCULATED.3IONS-SCNC: 9 MMOL/L (ref 7–15)
BASE EXCESS BLDV CALC-SCNC: 6.2 MMOL/L (ref -3–3)
BASE EXCESS BLDV CALC-SCNC: 6.2 MMOL/L (ref -3–3)
BUN SERPL-MCNC: 36.4 MG/DL (ref 8–23)
BUN SERPL-MCNC: 39.7 MG/DL (ref 8–23)
BUN SERPL-MCNC: 41.3 MG/DL (ref 8–23)
CA-I BLD-MCNC: 4.3 MG/DL (ref 4.4–5.2)
CALCIUM SERPL-MCNC: 7.6 MG/DL (ref 8.8–10.4)
CALCIUM SERPL-MCNC: 8 MG/DL (ref 8.8–10.4)
CALCIUM SERPL-MCNC: 8.1 MG/DL (ref 8.8–10.4)
CHLORIDE SERPL-SCNC: 113 MMOL/L (ref 98–107)
CHLORIDE SERPL-SCNC: 114 MMOL/L (ref 98–107)
CHLORIDE SERPL-SCNC: 114 MMOL/L (ref 98–107)
CREAT SERPL-MCNC: 1.03 MG/DL (ref 0.67–1.17)
CREAT SERPL-MCNC: 1.04 MG/DL (ref 0.67–1.17)
CREAT SERPL-MCNC: 1.04 MG/DL (ref 0.67–1.17)
EGFRCR SERPLBLD CKD-EPI 2021: 80 ML/MIN/1.73M2
EGFRCR SERPLBLD CKD-EPI 2021: 80 ML/MIN/1.73M2
EGFRCR SERPLBLD CKD-EPI 2021: 81 ML/MIN/1.73M2
ERYTHROCYTE [DISTWIDTH] IN BLOOD BY AUTOMATED COUNT: 17.2 % (ref 10–15)
ERYTHROCYTE [DISTWIDTH] IN BLOOD BY AUTOMATED COUNT: 17.3 % (ref 10–15)
FENTANYL BLD CFM-MCNC: 1.8 NG/ML
FENTANYL SPEC QL: POSITIVE
GLUCOSE BLDC GLUCOMTR-MCNC: 125 MG/DL (ref 70–99)
GLUCOSE BLDC GLUCOMTR-MCNC: 132 MG/DL (ref 70–99)
GLUCOSE BLDC GLUCOMTR-MCNC: 136 MG/DL (ref 70–99)
GLUCOSE BLDC GLUCOMTR-MCNC: 140 MG/DL (ref 70–99)
GLUCOSE BLDC GLUCOMTR-MCNC: 141 MG/DL (ref 70–99)
GLUCOSE BLDC GLUCOMTR-MCNC: 149 MG/DL (ref 70–99)
GLUCOSE BLDC GLUCOMTR-MCNC: 151 MG/DL (ref 70–99)
GLUCOSE BLDC GLUCOMTR-MCNC: 161 MG/DL (ref 70–99)
GLUCOSE BLDC GLUCOMTR-MCNC: 181 MG/DL (ref 70–99)
GLUCOSE BLDC GLUCOMTR-MCNC: 199 MG/DL (ref 70–99)
GLUCOSE BLDC GLUCOMTR-MCNC: 93 MG/DL (ref 70–99)
GLUCOSE SERPL-MCNC: 144 MG/DL (ref 70–99)
GLUCOSE SERPL-MCNC: 152 MG/DL (ref 70–99)
GLUCOSE SERPL-MCNC: 174 MG/DL (ref 70–99)
HCO3 BLDV-SCNC: 31 MMOL/L (ref 21–28)
HCO3 BLDV-SCNC: 31 MMOL/L (ref 21–28)
HCO3 SERPL-SCNC: 27 MMOL/L (ref 22–29)
HCO3 SERPL-SCNC: 28 MMOL/L (ref 22–29)
HCO3 SERPL-SCNC: 29 MMOL/L (ref 22–29)
HCT VFR BLD AUTO: 24.7 % (ref 40–53)
HCT VFR BLD AUTO: 25.2 % (ref 40–53)
HGB BLD-MCNC: 7.7 G/DL (ref 13.3–17.7)
HGB BLD-MCNC: 8 G/DL (ref 13.3–17.7)
LACTATE SERPL-SCNC: 1.3 MMOL/L (ref 0.7–2)
MAGNESIUM SERPL-MCNC: 2.5 MG/DL (ref 1.7–2.3)
MCH RBC QN AUTO: 28.5 PG (ref 26.5–33)
MCH RBC QN AUTO: 30.1 PG (ref 26.5–33)
MCHC RBC AUTO-ENTMCNC: 30.6 G/DL (ref 31.5–36.5)
MCHC RBC AUTO-ENTMCNC: 32.4 G/DL (ref 31.5–36.5)
MCV RBC AUTO: 93 FL (ref 78–100)
MCV RBC AUTO: 93 FL (ref 78–100)
NORFENTANYL BLD CFM-MCNC: NEGATIVE NG/ML
O2/TOTAL GAS SETTING VFR VENT: 30 %
O2/TOTAL GAS SETTING VFR VENT: 30 %
OXYCODONE SERPL-MCNC: POSITIVE NG/ML
OXYCODONE SERPLBLD CFM-MCNC: 11.3 NG/ML
OXYHGB MFR BLDV: 56 % (ref 70–75)
OXYHGB MFR BLDV: 60 % (ref 70–75)
OXYMORPHONE SERPL-MCNC: NEGATIVE NG/ML
PCO2 BLDV: 44 MM HG (ref 40–50)
PCO2 BLDV: 46 MM HG (ref 40–50)
PH BLDV: 7.44 [PH] (ref 7.32–7.43)
PH BLDV: 7.45 [PH] (ref 7.32–7.43)
PHOSPHATE SERPL-MCNC: 3.3 MG/DL (ref 2.5–4.5)
PLATELET # BLD AUTO: 136 10E3/UL (ref 150–450)
PLATELET # BLD AUTO: 146 10E3/UL (ref 150–450)
PO2 BLDV: 32 MM HG (ref 25–47)
PO2 BLDV: 34 MM HG (ref 25–47)
POTASSIUM SERPL-SCNC: 3.1 MMOL/L (ref 3.4–5.3)
POTASSIUM SERPL-SCNC: 3.2 MMOL/L (ref 3.4–5.3)
POTASSIUM SERPL-SCNC: 3.2 MMOL/L (ref 3.4–5.3)
POTASSIUM SERPL-SCNC: 3.4 MMOL/L (ref 3.4–5.3)
POTASSIUM SERPL-SCNC: 3.5 MMOL/L (ref 3.4–5.3)
RBC # BLD AUTO: 2.66 10E6/UL (ref 4.4–5.9)
RBC # BLD AUTO: 2.7 10E6/UL (ref 4.4–5.9)
S100 CA BINDING PROTEIN B SER-MCNC: 1195 NG/L
S100 CA BINDING PROTEIN B SER-MCNC: 373 NG/L
SAO2 % BLDV: 57 % (ref 70–75)
SAO2 % BLDV: 61.5 % (ref 70–75)
SODIUM SERPL-SCNC: 149 MMOL/L (ref 135–145)
SODIUM SERPL-SCNC: 152 MMOL/L (ref 135–145)
SODIUM SERPL-SCNC: 152 MMOL/L (ref 135–145)
WBC # BLD AUTO: 8.8 10E3/UL (ref 4–11)
WBC # BLD AUTO: 9.3 10E3/UL (ref 4–11)

## 2025-01-07 PROCEDURE — 71045 X-RAY EXAM CHEST 1 VIEW: CPT | Mod: 26 | Performed by: RADIOLOGY

## 2025-01-07 PROCEDURE — 80048 BASIC METABOLIC PNL TOTAL CA: CPT | Performed by: STUDENT IN AN ORGANIZED HEALTH CARE EDUCATION/TRAINING PROGRAM

## 2025-01-07 PROCEDURE — 272N000272 HC CONTINUOUS NEBULIZER MICRO PUMP

## 2025-01-07 PROCEDURE — 80048 BASIC METABOLIC PNL TOTAL CA: CPT | Performed by: NURSE PRACTITIONER

## 2025-01-07 PROCEDURE — 250N000011 HC RX IP 250 OP 636: Performed by: NURSE PRACTITIONER

## 2025-01-07 PROCEDURE — 84100 ASSAY OF PHOSPHORUS: CPT | Performed by: STUDENT IN AN ORGANIZED HEALTH CARE EDUCATION/TRAINING PROGRAM

## 2025-01-07 PROCEDURE — 94640 AIRWAY INHALATION TREATMENT: CPT | Mod: 76

## 2025-01-07 PROCEDURE — 70450 CT HEAD/BRAIN W/O DYE: CPT | Mod: 26 | Performed by: RADIOLOGY

## 2025-01-07 PROCEDURE — 82565 ASSAY OF CREATININE: CPT | Performed by: STUDENT IN AN ORGANIZED HEALTH CARE EDUCATION/TRAINING PROGRAM

## 2025-01-07 PROCEDURE — 250N000009 HC RX 250

## 2025-01-07 PROCEDURE — 94003 VENT MGMT INPAT SUBQ DAY: CPT

## 2025-01-07 PROCEDURE — 250N000011 HC RX IP 250 OP 636

## 2025-01-07 PROCEDURE — 97530 THERAPEUTIC ACTIVITIES: CPT | Mod: GO

## 2025-01-07 PROCEDURE — 71045 X-RAY EXAM CHEST 1 VIEW: CPT

## 2025-01-07 PROCEDURE — 999N000253 HC STATISTIC WEANING TRIALS

## 2025-01-07 PROCEDURE — 85048 AUTOMATED LEUKOCYTE COUNT: CPT | Performed by: NURSE PRACTITIONER

## 2025-01-07 PROCEDURE — 97129 THER IVNTJ 1ST 15 MIN: CPT | Mod: GO

## 2025-01-07 PROCEDURE — 85027 COMPLETE CBC AUTOMATED: CPT | Performed by: NURSE PRACTITIONER

## 2025-01-07 PROCEDURE — 82805 BLOOD GASES W/O2 SATURATION: CPT | Performed by: STUDENT IN AN ORGANIZED HEALTH CARE EDUCATION/TRAINING PROGRAM

## 2025-01-07 PROCEDURE — 250N000011 HC RX IP 250 OP 636: Mod: JZ | Performed by: PHYSICIAN ASSISTANT

## 2025-01-07 PROCEDURE — 83605 ASSAY OF LACTIC ACID: CPT | Performed by: STUDENT IN AN ORGANIZED HEALTH CARE EDUCATION/TRAINING PROGRAM

## 2025-01-07 PROCEDURE — 200N000002 HC R&B ICU UMMC

## 2025-01-07 PROCEDURE — 250N000011 HC RX IP 250 OP 636: Performed by: STUDENT IN AN ORGANIZED HEALTH CARE EDUCATION/TRAINING PROGRAM

## 2025-01-07 PROCEDURE — 250N000013 HC RX MED GY IP 250 OP 250 PS 637

## 2025-01-07 PROCEDURE — 70450 CT HEAD/BRAIN W/O DYE: CPT

## 2025-01-07 PROCEDURE — 94667 MNPJ CHEST WALL 1ST: CPT

## 2025-01-07 PROCEDURE — 97167 OT EVAL HIGH COMPLEX 60 MIN: CPT | Mod: GO

## 2025-01-07 PROCEDURE — 82310 ASSAY OF CALCIUM: CPT | Performed by: NURSE PRACTITIONER

## 2025-01-07 PROCEDURE — 94640 AIRWAY INHALATION TREATMENT: CPT

## 2025-01-07 PROCEDURE — 250N000013 HC RX MED GY IP 250 OP 250 PS 637: Performed by: NURSE PRACTITIONER

## 2025-01-07 PROCEDURE — 85018 HEMOGLOBIN: CPT | Performed by: NURSE PRACTITIONER

## 2025-01-07 PROCEDURE — 250N000012 HC RX MED GY IP 250 OP 636 PS 637: Performed by: STUDENT IN AN ORGANIZED HEALTH CARE EDUCATION/TRAINING PROGRAM

## 2025-01-07 PROCEDURE — 250N000009 HC RX 250: Performed by: STUDENT IN AN ORGANIZED HEALTH CARE EDUCATION/TRAINING PROGRAM

## 2025-01-07 PROCEDURE — 250N000013 HC RX MED GY IP 250 OP 250 PS 637: Performed by: PHYSICIAN ASSISTANT

## 2025-01-07 PROCEDURE — 82330 ASSAY OF CALCIUM: CPT | Performed by: PHYSICIAN ASSISTANT

## 2025-01-07 PROCEDURE — 99291 CRITICAL CARE FIRST HOUR: CPT | Mod: 24 | Performed by: ANESTHESIOLOGY

## 2025-01-07 PROCEDURE — 250N000013 HC RX MED GY IP 250 OP 250 PS 637: Performed by: STUDENT IN AN ORGANIZED HEALTH CARE EDUCATION/TRAINING PROGRAM

## 2025-01-07 PROCEDURE — 82805 BLOOD GASES W/O2 SATURATION: CPT | Performed by: NURSE PRACTITIONER

## 2025-01-07 PROCEDURE — 999N000157 HC STATISTIC RCP TIME EA 10 MIN

## 2025-01-07 PROCEDURE — 84132 ASSAY OF SERUM POTASSIUM: CPT | Performed by: STUDENT IN AN ORGANIZED HEALTH CARE EDUCATION/TRAINING PROGRAM

## 2025-01-07 PROCEDURE — 82310 ASSAY OF CALCIUM: CPT | Performed by: STUDENT IN AN ORGANIZED HEALTH CARE EDUCATION/TRAINING PROGRAM

## 2025-01-07 PROCEDURE — 83735 ASSAY OF MAGNESIUM: CPT | Performed by: STUDENT IN AN ORGANIZED HEALTH CARE EDUCATION/TRAINING PROGRAM

## 2025-01-07 RX ORDER — POTASSIUM CHLORIDE 29.8 MG/ML
20 INJECTION INTRAVENOUS
Status: COMPLETED | OUTPATIENT
Start: 2025-01-07 | End: 2025-01-07

## 2025-01-07 RX ORDER — POTASSIUM CHLORIDE 1.5 G/1.58G
40 POWDER, FOR SOLUTION ORAL ONCE
Status: COMPLETED | OUTPATIENT
Start: 2025-01-07 | End: 2025-01-07

## 2025-01-07 RX ORDER — POLYETHYLENE GLYCOL 3350 17 G/17G
17 POWDER, FOR SOLUTION ORAL DAILY PRN
Status: DISCONTINUED | OUTPATIENT
Start: 2025-01-07 | End: 2025-01-16

## 2025-01-07 RX ORDER — POTASSIUM CHLORIDE 1.5 G/1.58G
40 POWDER, FOR SOLUTION ORAL 2 TIMES DAILY
Status: COMPLETED | OUTPATIENT
Start: 2025-01-07 | End: 2025-01-07

## 2025-01-07 RX ORDER — NICOTINE POLACRILEX 4 MG
15-30 LOZENGE BUCCAL
Status: DISCONTINUED | OUTPATIENT
Start: 2025-01-07 | End: 2025-01-18

## 2025-01-07 RX ORDER — FUROSEMIDE 10 MG/ML
40 INJECTION INTRAMUSCULAR; INTRAVENOUS ONCE
Status: COMPLETED | OUTPATIENT
Start: 2025-01-07 | End: 2025-01-07

## 2025-01-07 RX ORDER — ACETYLCYSTEINE 200 MG/ML
2 SOLUTION ORAL; RESPIRATORY (INHALATION)
Status: DISCONTINUED | OUTPATIENT
Start: 2025-01-07 | End: 2025-01-11

## 2025-01-07 RX ORDER — AMOXICILLIN 250 MG
1 CAPSULE ORAL 2 TIMES DAILY PRN
Status: DISCONTINUED | OUTPATIENT
Start: 2025-01-07 | End: 2025-01-25 | Stop reason: HOSPADM

## 2025-01-07 RX ORDER — PROPOFOL 10 MG/ML
5-75 INJECTION, EMULSION INTRAVENOUS CONTINUOUS
Status: DISCONTINUED | OUTPATIENT
Start: 2025-01-07 | End: 2025-01-07

## 2025-01-07 RX ORDER — IPRATROPIUM BROMIDE AND ALBUTEROL SULFATE 2.5; .5 MG/3ML; MG/3ML
3 SOLUTION RESPIRATORY (INHALATION)
Status: DISCONTINUED | OUTPATIENT
Start: 2025-01-07 | End: 2025-01-11

## 2025-01-07 RX ORDER — PROPOFOL 10 MG/ML
5-10 INJECTION, EMULSION INTRAVENOUS CONTINUOUS
Status: DISCONTINUED | OUTPATIENT
Start: 2025-01-07 | End: 2025-01-07

## 2025-01-07 RX ORDER — POTASSIUM CHLORIDE 20MEQ/15ML
40 LIQUID (ML) ORAL ONCE
Status: COMPLETED | OUTPATIENT
Start: 2025-01-07 | End: 2025-01-07

## 2025-01-07 RX ORDER — METOLAZONE 5 MG/1
5 TABLET ORAL ONCE
Status: COMPLETED | OUTPATIENT
Start: 2025-01-07 | End: 2025-01-07

## 2025-01-07 RX ORDER — CEFTRIAXONE 2 G/1
2 INJECTION, POWDER, FOR SOLUTION INTRAMUSCULAR; INTRAVENOUS EVERY 24 HOURS
Status: DISCONTINUED | OUTPATIENT
Start: 2025-01-07 | End: 2025-01-08

## 2025-01-07 RX ORDER — DEXTROSE MONOHYDRATE 25 G/50ML
25-50 INJECTION, SOLUTION INTRAVENOUS
Status: DISCONTINUED | OUTPATIENT
Start: 2025-01-07 | End: 2025-01-18

## 2025-01-07 RX ORDER — POTASSIUM CHLORIDE 1.5 G/1.58G
20 POWDER, FOR SOLUTION ORAL ONCE
Status: COMPLETED | OUTPATIENT
Start: 2025-01-07 | End: 2025-01-07

## 2025-01-07 RX ADMIN — HEPARIN SODIUM 5000 UNITS: 5000 INJECTION, SOLUTION INTRAVENOUS; SUBCUTANEOUS at 07:59

## 2025-01-07 RX ADMIN — POTASSIUM CHLORIDE 40 MEQ: 1.5 POWDER, FOR SOLUTION ORAL at 07:45

## 2025-01-07 RX ADMIN — FUROSEMIDE 40 MG: 10 INJECTION, SOLUTION INTRAMUSCULAR; INTRAVENOUS at 07:56

## 2025-01-07 RX ADMIN — IPRATROPIUM BROMIDE AND ALBUTEROL SULFATE 3 ML: .5; 3 SOLUTION RESPIRATORY (INHALATION) at 13:01

## 2025-01-07 RX ADMIN — HYDROXYZINE HYDROCHLORIDE 50 MG: 50 TABLET ORAL at 07:47

## 2025-01-07 RX ADMIN — POTASSIUM CHLORIDE 20 MEQ: 29.8 INJECTION INTRAVENOUS at 18:17

## 2025-01-07 RX ADMIN — ACETYLCYSTEINE 2 ML: 200 SOLUTION ORAL; RESPIRATORY (INHALATION) at 17:34

## 2025-01-07 RX ADMIN — POTASSIUM CHLORIDE 20 MEQ: 29.8 INJECTION INTRAVENOUS at 17:12

## 2025-01-07 RX ADMIN — POTASSIUM CHLORIDE 40 MEQ: 20 SOLUTION ORAL at 22:52

## 2025-01-07 RX ADMIN — DEXMEDETOMIDINE HYDROCHLORIDE 1.2 MCG/KG/HR: 4 INJECTION, SOLUTION INTRAVENOUS at 09:00

## 2025-01-07 RX ADMIN — DEXMEDETOMIDINE HYDROCHLORIDE 1.2 MCG/KG/HR: 4 INJECTION, SOLUTION INTRAVENOUS at 13:03

## 2025-01-07 RX ADMIN — DEXMEDETOMIDINE HYDROCHLORIDE 1.2 MCG/KG/HR: 4 INJECTION, SOLUTION INTRAVENOUS at 21:36

## 2025-01-07 RX ADMIN — OXYCODONE HYDROCHLORIDE 10 MG: 10 TABLET ORAL at 22:44

## 2025-01-07 RX ADMIN — IPRATROPIUM BROMIDE AND ALBUTEROL SULFATE 3 ML: .5; 3 SOLUTION RESPIRATORY (INHALATION) at 20:55

## 2025-01-07 RX ADMIN — DEXMEDETOMIDINE HYDROCHLORIDE 1.2 MCG/KG/HR: 4 INJECTION, SOLUTION INTRAVENOUS at 01:28

## 2025-01-07 RX ADMIN — PIPERACILLIN SODIUM AND TAZOBACTAM SODIUM 4.5 G: 4; .5 INJECTION, SOLUTION INTRAVENOUS at 05:50

## 2025-01-07 RX ADMIN — HEPARIN SODIUM 5000 UNITS: 5000 INJECTION, SOLUTION INTRAVENOUS; SUBCUTANEOUS at 23:22

## 2025-01-07 RX ADMIN — ASPIRIN 81 MG CHEWABLE TABLET 162 MG: 81 TABLET CHEWABLE at 07:47

## 2025-01-07 RX ADMIN — OLANZAPINE 5 MG: 5 TABLET, ORALLY DISINTEGRATING ORAL at 22:44

## 2025-01-07 RX ADMIN — PANTOPRAZOLE SODIUM 40 MG: 40 INJECTION, POWDER, FOR SOLUTION INTRAVENOUS at 07:57

## 2025-01-07 RX ADMIN — BACLOFEN 10 MG: 10 TABLET ORAL at 15:39

## 2025-01-07 RX ADMIN — ACETYLCYSTEINE 2 ML: 200 SOLUTION ORAL; RESPIRATORY (INHALATION) at 13:01

## 2025-01-07 RX ADMIN — CHLORHEXIDINE GLUCONATE 15 ML: 1.2 SOLUTION ORAL at 19:43

## 2025-01-07 RX ADMIN — Medication 60 ML: at 07:49

## 2025-01-07 RX ADMIN — OLANZAPINE 5 MG: 5 TABLET, ORALLY DISINTEGRATING ORAL at 08:44

## 2025-01-07 RX ADMIN — INSULIN ASPART 3 UNITS: 100 INJECTION, SOLUTION INTRAVENOUS; SUBCUTANEOUS at 23:43

## 2025-01-07 RX ADMIN — DEXMEDETOMIDINE HYDROCHLORIDE 1.2 MCG/KG/HR: 4 INJECTION, SOLUTION INTRAVENOUS at 05:15

## 2025-01-07 RX ADMIN — HEPARIN SODIUM 5000 UNITS: 5000 INJECTION, SOLUTION INTRAVENOUS; SUBCUTANEOUS at 15:43

## 2025-01-07 RX ADMIN — METOLAZONE 5 MG: 5 TABLET ORAL at 07:47

## 2025-01-07 RX ADMIN — INSULIN ASPART 1 UNITS: 100 INJECTION, SOLUTION INTRAVENOUS; SUBCUTANEOUS at 07:43

## 2025-01-07 RX ADMIN — ACETYLCYSTEINE 2 ML: 200 SOLUTION ORAL; RESPIRATORY (INHALATION) at 20:55

## 2025-01-07 RX ADMIN — IPRATROPIUM BROMIDE AND ALBUTEROL SULFATE 3 ML: .5; 3 SOLUTION RESPIRATORY (INHALATION) at 17:34

## 2025-01-07 RX ADMIN — CEFTRIAXONE SODIUM 2 G: 2 INJECTION, POWDER, FOR SOLUTION INTRAMUSCULAR; INTRAVENOUS at 12:15

## 2025-01-07 RX ADMIN — POTASSIUM CHLORIDE 40 MEQ: 1.5 POWDER, FOR SOLUTION ORAL at 11:54

## 2025-01-07 RX ADMIN — CHLORHEXIDINE GLUCONATE 15 ML: 1.2 SOLUTION ORAL at 07:46

## 2025-01-07 RX ADMIN — INSULIN ASPART 1 UNITS: 100 INJECTION, SOLUTION INTRAVENOUS; SUBCUTANEOUS at 10:48

## 2025-01-07 RX ADMIN — BACLOFEN 10 MG: 10 TABLET ORAL at 23:22

## 2025-01-07 RX ADMIN — BACLOFEN 10 MG: 10 TABLET ORAL at 07:47

## 2025-01-07 RX ADMIN — Medication 15 ML: at 07:45

## 2025-01-07 RX ADMIN — PROPOFOL 8 MCG/KG/MIN: 10 INJECTION, EMULSION INTRAVENOUS at 06:51

## 2025-01-07 RX ADMIN — DEXMEDETOMIDINE HYDROCHLORIDE 1.2 MCG/KG/HR: 4 INJECTION, SOLUTION INTRAVENOUS at 17:25

## 2025-01-07 RX ADMIN — ROSUVASTATIN CALCIUM 40 MG: 20 TABLET, FILM COATED ORAL at 07:46

## 2025-01-07 RX ADMIN — OXYCODONE HYDROCHLORIDE 5 MG: 5 TABLET ORAL at 08:43

## 2025-01-07 RX ADMIN — INSULIN ASPART 2 UNITS: 100 INJECTION, SOLUTION INTRAVENOUS; SUBCUTANEOUS at 19:52

## 2025-01-07 RX ADMIN — CALCIUM GLUCONATE 1 G: 20 INJECTION, SOLUTION INTRAVENOUS at 06:40

## 2025-01-07 RX ADMIN — Medication 10 MG: at 21:32

## 2025-01-07 RX ADMIN — POTASSIUM CHLORIDE 40 MEQ: 1.5 POWDER, FOR SOLUTION ORAL at 19:43

## 2025-01-07 RX ADMIN — INSULIN GLARGINE 20 UNITS: 100 INJECTION, SOLUTION SUBCUTANEOUS at 07:43

## 2025-01-07 RX ADMIN — POTASSIUM CHLORIDE 20 MEQ: 1.5 POWDER, FOR SOLUTION ORAL at 07:45

## 2025-01-07 RX ADMIN — SERTRALINE HYDROCHLORIDE 200 MG: 100 TABLET ORAL at 07:47

## 2025-01-07 RX ADMIN — Medication 0.4 MG/HR: at 11:36

## 2025-01-07 RX ADMIN — OXYCODONE HYDROCHLORIDE 10 MG: 10 TABLET ORAL at 15:39

## 2025-01-07 RX ADMIN — PIPERACILLIN SODIUM AND TAZOBACTAM SODIUM 4.5 G: 4; .5 INJECTION, SOLUTION INTRAVENOUS at 11:07

## 2025-01-07 ASSESSMENT — ACTIVITIES OF DAILY LIVING (ADL)
ADLS_ACUITY_SCORE: 63
ADLS_ACUITY_SCORE: 59
ADLS_ACUITY_SCORE: 55
ADLS_ACUITY_SCORE: 63
ADLS_ACUITY_SCORE: 55
ADLS_ACUITY_SCORE: 59
ADLS_ACUITY_SCORE: 55
ADLS_ACUITY_SCORE: 59
ADLS_ACUITY_SCORE: 59
ADLS_ACUITY_SCORE: 61
ADLS_ACUITY_SCORE: 55
ADLS_ACUITY_SCORE: 55
ADLS_ACUITY_SCORE: 63
ADLS_ACUITY_SCORE: 63
ADLS_ACUITY_SCORE: 55
ADLS_ACUITY_SCORE: 59
ADLS_ACUITY_SCORE: 55

## 2025-01-07 NOTE — PROGRESS NOTES
"   01/07/25 1400   Appointment Info   Signing Clinician's Name / Credentials (OT) Renetta Raman, OTD, OTR/L   Rehab Comments (OT) R rylan TIRADO   Living Environment   People in Home significant other   Current Living Arrangements apartment   Home Accessibility wheelchair accessible   Living Environment Comments Per chart review (OP OT session in 8/24), pt lives in a w/ch accessible apartment with his partner. Has ramp entry. Will need to verify.   Self-Care   Current Activity Tolerance poor   Activity/Exercise/Self-Care Comment Per chart review, pt was IND with I/ADLs, has a manual wheelchair at home. Unsure if pt has prosthetic. Pt appears to have many recent falls, unsure of circumstances.   General Information   Onset of Illness/Injury or Date of Surgery 01/02/25   Referring Physician Sonia Fajarod, GENEVIEVE   Additional Occupational Profile Info/Pertinent History of Current Problem \"Erwin Aguilar is a 64 year old male with a past medical history of DM2, HTN, PVD, and neuropathic pain who initially presented on 12/31/24 to the ED with arm pain, vomiting, and diarrhea; ACS and ST depression. He subsequently had VT arrest with 1 round of CPR, epinephrine, defibrillation. After ROSC had afib with RVR. Became hypotensive and was subsequently cannulated for VA ECMO on 1/2/25, s/p CABG x4, and arrived to the ICU at that time. He was de cannulated on 1/3/25 at bedside. ICU course complicated by large R ptx seen on chest CT on 1/4/25, CVTS Fellow placed chest tube at bedside with improvement of pneumothorax.\"   Existing Precautions/Restrictions cardiac;fall   Left Upper Extremity (Weight-bearing Status)   (<10lbs)   Right Upper Extremity (Weight-bearing Status)   (<10lbs)   Left Lower Extremity (Weight-bearing Status) full weight-bearing (FWB)   Right Lower Extremity (Weight-bearing Status) other (see comments)  (BKA)   Cognitive Status Examination   Follows Commands follows one-step commands;50-74% accuracy;increased " processing time needed;repetition of directions required   Visual Perception   Visual Impairment/Limitations unable/difficult to assess   Range of Motion Comprehensive   Comment, General Range of Motion BUE limited by postop precs   Strength Comprehensive (MMT)   Comment, General Manual Muscle Testing (MMT) Assessment grossly deconditioned, NFT 2/2 postop precs   Coordination   Coordination Comments anticipate impairments in FMC and GMC   Bed Mobility   Comment (Bed Mobility) max Ax2 rolling L<>R   Transfers   Transfer Comments OH lift   Activities of Daily Living   BADL Assessment/Intervention upper body dressing;lower body dressing;toileting   Upper Body Dressing Assessment/Training   Comment, (Upper Body Dressing) anticipate total A per clinical judgement   Lower Body Dressing Assessment/Training   Comment, (Lower Body Dressing) anticipate total A per clinical judgement   Toileting   Comment, (Toileting) anticipate total A per clinical judgement   Clinical Impression   Criteria for Skilled Therapeutic Interventions Met (OT) Yes, treatment indicated   OT Diagnosis dec IND with ADLs, CR, postop precs, cognition   OT Problem List-Impairments impacting ADL problems related to;activity tolerance impaired;balance;mobility;strength;pain;post-surgical precautions;cognition;coordination   Assessment of Occupational Performance 5 or more Performance Deficits   Identified Performance Deficits bathing, toileting, dressing, g/h, func mobility, cognition, safety   Planned Therapy Interventions (OT) ADL retraining;IADL retraining;bed mobility training;strengthening;transfer training;home program guidelines;progressive activity/exercise;risk factor education;cognition;fine motor coordination training   Clinical Decision Making Complexity (OT) comprehensive assessment/high complexity   Risk & Benefits of therapy have been explained evaluation/treatment results reviewed;care plan/treatment goals reviewed;risks/benefits  reviewed;current/potential barriers reviewed;participants voiced agreement with care plan;participants included;patient   OT Total Evaluation Time   OT Eval, High Complexity Minutes (48367) 6   OT Goals   Therapy Frequency (OT) 4 times/week   OT Predicted Duration/Target Date for Goal Attainment 02/06/25   OT Goals Hygiene/Grooming;Upper Body Dressing;Lower Body Dressing;Toilet Transfer/Toileting;Cardiac Phase 1;Cognition   OT: Hygiene/Grooming minimal assist;within precautions   OT: Upper Body Dressing Minimal assist;within precautions   OT: Lower Body Dressing Minimal assist;within precautions   OT: Toilet Transfer/Toileting Minimal assist;toilet transfer;cleaning and garment management;within precautions   OT: Cognitive Patient/caregiver will verbalize understanding of cognitive assessment results/recommendations as needed for safe discharge planning   OT: Understanding of cardiac education to maximize quality of life, condition management, and health outcomes Patient;Verbalize   OT: Perform aerobic activity with stable cardiovascular response continuous;5 minutes   OT Discharge Planning   OT Plan Verify home setup as able, progress command follow, BADLs as able, scanning/tracking    OT Discharge Recommendation (DC Rec) Transitional Care Facility;Acute Rehab Center-Motivated patient will benefit from intensive, interdisciplinary therapy.  Anticipate will be able to tolerate 3 hours of therapy per day   OT Rationale for DC Rec Pt currently far below functional baseline with I/ADL engagement, will benefit from ongoing rehab once medically ready. Unclear at this time if pt will be able to tolerate ARU, will continue to update and monitor as pt progresses.   OT Brief overview of current status OH lift   Total Session Time   Timed Code Treatment Minutes 18   Total Session Time (sum of timed and untimed services) 24

## 2025-01-07 NOTE — PROGRESS NOTES
[facility]    ICU Progress Note       Date of Admission:  12/31/2024    Assessment: Critical Care   Erwin Aguilar is a 64 year old male with a past medical history of DM2, HTN, PVD, and neuropathic pain who initially presented on 12/31/24 to the ED with arm pain, vomiting, and diarrhea; ACS and ST depression. He subsequently had VT arrest with 1 round of CPR, epinephrine, defibrillation. After ROSC had afib with RVR. Became hypotensive and was subsequently cannulated for VA ECMO on 1/2/25, s/p CABG x4, and arrived to the ICU at that time. He was de cannulated on 1/3/25 at bedside. ICU course complicated by large R ptx seen on chest CT on 1/4/25, CVTS Fellow placed chest tube at bedside with improvement of pneumothorax.       MAJOR CHANGES/UPDATES  - ON, pt was agitated and was started on propofol to meet RASS goals   - PST today  - RN reported moderate secretions today, ordered CPT, mucomyst nebs, and duonebs  - Narrowed abx to CTX  - Diurese with lasix 40 mg and metolazone 5 mg x1 for fluid goal -1.0 L   - Transition from insulin gtt to high sliding scale insulin and increased  glargine to 20 units   - Still hypernatremic, if still no improvement with PM BMP, increase FWF to 400 ml q 4hr    Plan: Critical Care     PLAN:  Neuro/ pain/ sedation:  # Acute postoperative pain  # Chronic pain with opioid dependence  # Neuropathy, PTA  # Sedation while on mechanical ventilation   # Agitation  # Delrium   Analgesia   - Gtt Dilaudid wean as able   - Scheduled: baclofen    - PRN: oxycodone  Sedation   - ON, pt was agitated and was started on propofol to meet RASS goals. Plan to discontinue propofol and optimize PO PRNs, consider scheduling olanzapine at bedtime    - Gtt: Precedex gtt wean as able   - Scheduled: Melaontin 10 mg at bedtime    - PRN: hydroxyzine, olanzapine  RASS goal 0 to -1     # Generalized Anxiety, PTA   - Continue PTA Sertraline  - Restart PTA PRN hydroxyzine       # Encephalopathy of unclear etiology   - Prior to procedure  - 1/1 vEEG without seizure activity (severe encephalopathy) and 12/31 and 1/4 CTH negative for acute findings.     Pulmonary care:   # Acute Hypoxic Respiratory failure   # Respiratory alkalosis 2:2 agitation - improved   # R tension pneumothorax   # Aspiration Pneumonia, Klebsiella  FiO2 (%): 30 %, Resp: 30, Vent Mode: CPAP/PS, Resp Rate (Set): 14 breaths/min, Tidal Volume (Set, mL): 400 mL, PEEP (cm H2O): 8 cmH2O, Pressure Support (cm H2O): 10 cmH2O, Resp Rate (Set): 14 breaths/min, Tidal Volume (Set, mL): 400 mL, PEEP (cm H2O): 8 cmH2O   - ON of 1/4 CT C/A/P ordered for persistently elevated lactate which revealed large R tension pneumothorax. CVTS notified and fellow placed chest tube at bedside this morning with improvement of pneumothorax.   - PST today potentially extubate    - 1/7 RN reported moderate secretions today, ordered CPT, mucomyst nebs, and duonebs to break up/thin secretions   - Wean vent as able  - VAP bundle while intubated  - Goal SpO2 > 92%  - CXR on arrival, then daily   - CXR this morning, per my review, similar to previous   - Monitor CT output, CVTS may pull tomorrow with pacer wires   - Please see ID for abx plan     Cardiovascular:    # Mixed Shock, cardiogenic, vasoplegic  # HTN, PTA   # VT Cardiac Arrest on VA ECMO (01/01/24 - 1/3)  # CAD s/p CABG x4  # Dyslipidemia, PTA   # Cardiomyopathy   1/5 TTE Normal LVEF 55-60%, normal RV function   - Monitor hemodynamics closely. Goal MAP >65, SBP <140   - NE off this morning, if continues to be off for 24 hrs, discontinue   - Aspirin 81mg daily   - Crestor 40mg daily  - A/V wires capped  - Hold PTA apixaban, Dyazide, propanolol     GI care / Nutrition:   # At risk for protein calorie malnutrition   # Constipation   - Diet: TF @ goal via NJ   - PPI  - Last BM: 1/6. Bowel reg PRN      Renal / Fluids / Electrolytes:   # Acute Kidney Injury 2/2 ATN - improving   # Hypokalemia   #  Hypernatremia  # Hypervolemia  BL creat appears to be ~ 0.6-0.8  - Diurese with fluid goal net -1.0 with lasix 40 mg and metolazone 5 mg in setting of hypernatremia  - Scheduled 40 mEQ of KCl q 12 hr x2 doses with diuresis   - Na+ is 152, free water deficit calculated to be 3.4 L   - Strict I/O, daily weights, avoid/limit nephrotoxins  - Pt's K+ this morning 3.7, replaced, recheck this afternoon   - Na+ up to 152, recheck this afternoon after diuresis, if still with no improvement, increase FWF to 400 ml q 4 hr   - Replete lytes PRN per protocol        Endocrine:    # Stress hyperglycemia  # Type 2 Diabetes Mellitus, PTA   - Preop A1c 5.7  - Transition from insulin gtt to sliding scale insulin   - Goal BG <180 for optimal healing  - Will increase Lantus to 20 units from 13 (PTA was on 26 units daily)     ID / Antibiotics:  # Stress induced leukocytosis  # Aspiration Pneumonia, Klebsiella  - Broadened abx from CTX (12/31 to 1/4) to zosyn 1/4-1/6 will narrow back to CTX today EOT TBD   - Monitor fever curve, WBC, and inflammatory markers as appropriate  - Pt afebrile with T max of 98.9, no leukocytosis with WBC of 9.3    Cultures  1/4 UA non-infectious appearing negative LE, nitrites, WBCs, and bacteria   1/4 Sputum cx gram stain 1+ Klebsiella pneumoniae   1/3 blood cx NGTD @ 2 day   12/31 sputum: Klebsiella pneumonia with resistance to ampicillin   12/31 MRSA and blood cultures negative     Heme:     # Acute blood loss anemia  # Acute thrombocytopenia  No s/sx active bleeding  - Daily CBC   - Hgb 8.0 today   - Hgb goal > 7.0, transfuse PRN   - DVT ppx: SQH      MSK / Skin:  # Sternotomy   # Surgical Incision  - Sternal precautions, postop incision management protocol  - PT/OT/CR    #PVD, PTA   - S/p right BKA, 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 All questions were answered. They are in agreement with plan.      Patient seen, findings and plan discussed with CVICU staff and CVTS staff.     Total Critical Care time spent by me, excluding procedures, was 45 minutes.        GENEVIEVE [facility]  Securely message with Vocera (more info)  Text page via [employer] Paging/Directory     Clinically Significant Risk Factors        # Hypokalemia: Lowest K = 3.1 mmol/L in last 2 days, will replace as needed  # Hypernatremia: Highest Na = 152 mmol/L in last 2 days, will monitor as appropriate  # Hyperchloremia: Highest Cl = 121 mmol/L in last 2 days, will monitor as appropriate      # Hypocalcemia: Lowest iCa = 4.3 mg/dL in last 2 days, will monitor and replace as appropriate     # Hypoalbuminemia: Lowest albumin = 2 g/dL at 1/2/2025  5:22 PM, will monitor as appropriate     # Thrombocytopenia: Lowest platelets = 86 in last 2 days, will monitor for bleeding   # Hypertension: Noted on problem list     # Acute Hypoxic Respiratory Failure: Documented O2 saturation < 90%. Continue supplemental oxygen as needed  # Acute Hypercapnic Respiratory Failure: based on arterial blood gas results.  Continue supplemental oxygen and ventilatory support as indicated.  # Acute Hypercapnic Respiratory Failure: based on venous blood gas results.  Continue supplemental oxygen and ventilatory support as indicated.         # Overweight: Estimated body mass index is 27.49 kg/m  as calculated from the following:    Height as of this encounter: 1.803 m (5' 11\").    Weight as of this encounter: 89.4 kg (197 lb 1.5 oz).      # Asthma: noted on problem list    # History of CABG: noted on surgical history       ____________________________________________________________________    Interval History   Unable to obtain due to pt's critical condition     Intake/Output  I/O last 3 " completed shifts:  In: 3357.21 [I.V.:892.21; NG/GT:1435]  Out: 5640 [Urine:5070; Chest Tube:570]      Physical Exam   Vital Signs: Temp: 99  F (37.2  C) Temp src: Axillary BP: 104/54 Pulse: 69   Resp: 30 SpO2: 100 % O2 Device: Mechanical Ventilator    Weight: 197 lbs 1.46 oz    Neuro: Intubated. Sedated. Eyes tracking today. Per RN starting to follow commands with wiggling toes. All extremities move spontaneously.   HEENT: Normocephalic, atraumatic. PERRL, and nonicteric.   CV: RRR on monitor, S1S2, all extremities well perfused   Respiratory: Normal respiratory effort on CMV RR 14, , PEEP 8 , FiO2 30 %. Equal chest rise b/l. Mediastinal chest tubes with 210 ml of serosanguinous output/24 hours without airleak noted. R pleural with 230 ml of serosanguinous output no airleak noted.    GI: Abdomen soft. Non-distended.   : Voiding with Rodriguez.   MSK: Right BKA. Moves all extremities well with normal appearing strength. Sensation intact in all upper/lower extremities.     Skin: Sternal incision open to air, no drainage, surrounding erythema or induration noted. Large hematoma on L groin near previous ECMO cannulation site.     Data   I reviewed all medications, new labs and imaging results over the last 24 hours.  Arterial Blood Gases   Recent Labs   Lab 01/06/25  0323 01/06/25  0014 01/05/25  1953 01/05/25  1423   PH 7.47* 7.49* 7.51* 7.54*   PCO2 38 37 35 32*   PO2 141* 114* 144* 109*   HCO3 28 28 28 27       Complete Blood Count   Recent Labs   Lab 01/07/25  1535 01/07/25  0423 01/06/25  1454 01/06/25  1001 01/06/25  0322   WBC 8.8 9.3 9.9  --  10.3   HGB 7.7* 8.0* 7.7* 8.4* 6.6*   * 136* 102*  --  86*       Basic Metabolic Panel  Recent Labs   Lab 01/07/25  1535 01/07/25  1046 01/07/25  1045 01/07/25  0740 01/07/25  0429 01/07/25  0423 01/06/25  2016 01/06/25 2012 01/06/25  1626 01/06/25  1454 01/06/25  0345 01/06/25  0322   NA  --   --  152*  --   --  149*  --   --   --  152*  --  152*   POTASSIUM   --   --  3.1*  --   --  3.5  --  3.1*  --  3.3*  --  3.7   CHLORIDE  --   --  114*  --   --  113*  --   --   --  121*  --  118*   CO2  --   --  28  --   --  27  --   --   --  24  --  25   BUN  --   --  39.7*  --   --  41.3*  --   --   --  42.3*  --  47.3*   CR  --   --  1.04  --   --  1.04  --   --   --  0.96  --  1.06   * 141* 152* 151*   < > 174*   < >  --    < > 110*   < > 108*    < > = values in this interval not displayed.       Liver Function Tests  Recent Labs   Lab 01/04/25  0216 01/03/25 0336 01/02/25 2155 01/02/25 2024 01/02/25  1724 01/02/25  1722   * 165*  --  175*  --  112*   * 65  --  62  --  33   ALKPHOS 66 54  --  59  --  52   BILITOTAL 0.7 0.5  --  0.9  --  0.8   ALBUMIN 2.4* 2.4*  --  2.3*  --  2.0*   INR  --  1.29* 1.42* 1.43* 1.59*  --        Pancreatic Enzymes  No lab results found in last 7 days.      Coagulation Profile  Recent Labs   Lab 01/03/25  1124 01/03/25 0336 01/02/25 2155 01/02/25 2024 01/02/25  1724   INR  --  1.29* 1.42* 1.43* 1.59*   PTT 34 35 39* 47* 71*       IMAGING:  Recent Results (from the past 24 hours)   XR Chest Port 1 View    Narrative    Exam: XR CHEST PORT 1 VIEW, 1/7/2025 1:51 AM    Indication: R pneumothorax.    Comparison: 1/6/2025.    Findings:   Portable semiupright AP views of the chest. Endotracheal tube tip  projects 5.4 cm above the edy. Gastric tube courses below the field  of view. Interval removal of the right internal jugular Sherman-Bala  catheter. New right internal jugular central venous catheter tip is at  the confluence of the brachiocephalic veins. Stable positioning of  mediastinal drains. Stable positioning of the right apically oriented  chest tube and left basilar oriented chest tube. Epicardial stimulator  leads. Postoperative changes of the chest with intact median  sternotomy wires, mediastinal surgical clips, and left atrial  appendage clip.     Trachea is midline. Cardiomediastinal silhouette is within  normal  limits. Similar streaky perihilar and bibasilar opacities. No  appreciable residual right pneumothorax. No large pleural effusion.  Bilateral costophrenic angles were excluded from the field-of-view.    Visualized portions of the upper abdomen are unremarkable. No acute  osseous abnormality. Subcutaneous emphysema tracking along the  superior right chest wall, increased since prior exam.      Impression    Impression:  1. No appreciable residual right pneumothorax. Stable positioning of  bilateral chest tubes.  2. [place]-Bala catheter has been removed. Right internal jugular central  venous catheter with tip projecting over the confluence of the  brachiocephalic veins.  3. Similar streaky perihilar and bibasilar opacities, favor pulmonary  edema with superimposed atelectasis.  4. Similar subcutaneous emphysema tracking along the right chest wall,  which has increased since prior exam.    I have personally reviewed the examination and initial interpretation  and I agree with the findings.    MARYLOU ESPAÑA MD         SYSTEM ID:  [id number]

## 2025-01-07 NOTE — PLAN OF CARE
Problem: Adult Inpatient Plan of Care  Goal: Plan of Care Review  Description: The Plan of Care Review/Shift note should be completed every shift.  The Outcome Evaluation is a brief statement about your assessment that the patient is improving, declining, or no change.  This information will be displayed automatically on your shift  note.  Outcome: Progressing  Flowsheets (Taken 1/7/2025 5364)  Plan of Care Reviewed With: patient  Overall Patient Progress: no change    Goal Outcome Evaluation:    Major Shift Events:      Neuro:  Sedated. ANDRE. Does not follow commands or track. Bites down on ETT/swabs. No clear cough with suctioning. Pt. Agitated at beginning if shift. Dex increased from 0.4 to 1.2. Dilaudid gtt increased. PRN bolus of dilaudid, PRN atarax, PRN zyprexa, and one time dose of fentanyl given to control agitation. No effect on decreasing patient's agitation. Propofol gtt initiated which reached target RASS.    CV:  SR 50-60ss. A wires connected to pacer rate 54. V wires disconnected. MAP goal > 65, SBP < 140. PRN hydralazine ordered for SBP > 140  Levo @ 0.02     Pulm:  Vent Mode: CMV 30%/14/400/8. Tachy. 4x CT    GI:  NG @ 63. TF @ 45 mL/hr (goal of 55). Loose liquid BM x2    :  Rodriguez with good urine output    Skin:  Scabs on chin and right temple.Right temple.  Blister on first finger of right hand.  Blanchable erythema on sacrum/coccyx.  Healed right BKA. Significant bruising post ECMO sites    Drips:  Norepi 0.02  Dilaudid 0.4  Insulin 1  Dex: 1.2  Plan: Wean sedation and pressure support trial.    For vital signs and complete assessments, please see documentation flowsheets. '      Plan of Care Reviewed With: patient    Overall Patient Progress: no changeOverall Patient Progress: no change

## 2025-01-08 ENCOUNTER — APPOINTMENT (OUTPATIENT)
Dept: GENERAL RADIOLOGY | Facility: CLINIC | Age: 65
DRG: 003 | End: 2025-01-08
Payer: MEDICARE

## 2025-01-08 ENCOUNTER — APPOINTMENT (OUTPATIENT)
Dept: GENERAL RADIOLOGY | Facility: CLINIC | Age: 65
DRG: 003 | End: 2025-01-08
Attending: STUDENT IN AN ORGANIZED HEALTH CARE EDUCATION/TRAINING PROGRAM
Payer: MEDICARE

## 2025-01-08 LAB
11OH-THC SPEC-MCNC: 3.8 NG/ML
AMPHET BLD CFM-MCNC: NEGATIVE NG/ML
ANION GAP SERPL CALCULATED.3IONS-SCNC: 7 MMOL/L (ref 7–15)
ANION GAP SERPL CALCULATED.3IONS-SCNC: 9 MMOL/L (ref 7–15)
ANION GAP SERPL CALCULATED.3IONS-SCNC: 9 MMOL/L (ref 7–15)
APAP BLD-MCNC: NEGATIVE UG/ML
BACTERIA BLD CULT: NO GROWTH
BACTERIA BLD CULT: NO GROWTH
BARBITURATES SPEC-MCNC: NEGATIVE UG/ML
BASE EXCESS BLDV CALC-SCNC: 6.1 MMOL/L (ref -3–3)
BASE EXCESS BLDV CALC-SCNC: 6.6 MMOL/L (ref -3–3)
BENZODIAZ SPEC-MCNC: ABNORMAL NG/ML
BUN SERPL-MCNC: 21.3 MG/DL (ref 8–23)
BUN SERPL-MCNC: 26 MG/DL (ref 8–23)
BUN SERPL-MCNC: 27 MG/DL (ref 8–23)
BUPRENORPHINE SERPL-MCNC: NEGATIVE NG/ML
BZE BLD CFM-MCNC: NEGATIVE NG/ML
CA-I BLD-MCNC: 4.3 MG/DL (ref 4.4–5.2)
CALCIUM SERPL-MCNC: 7.7 MG/DL (ref 8.8–10.4)
CALCIUM SERPL-MCNC: 7.8 MG/DL (ref 8.8–10.4)
CALCIUM SERPL-MCNC: 8 MG/DL (ref 8.8–10.4)
CANNABIDIOL SERPLBLD CFM-MCNC: NEGATIVE NG/ML
CANNABINOIDS SERPL-MCNC: PRESENT NG/ML
CANNABINOIDS SPEC QL CFM: POSITIVE
CANNABINOIDS UR CFM-MCNC: 1460 NG/ML
CARBOXYTHC BLD-MCNC: ABNORMAL NG/ML
CARBOXYTHC SPEC-MCNC: 266.4 NG/ML
CARBOXYTHC/CREAT UR: 3318 NG/MG CREAT
CARISOPRODOL IA: NEGATIVE UG/ML
CHLORIDE SERPL-SCNC: 112 MMOL/L (ref 98–107)
CHLORIDE SERPL-SCNC: 113 MMOL/L (ref 98–107)
CHLORIDE SERPL-SCNC: 114 MMOL/L (ref 98–107)
CREAT SERPL-MCNC: 0.71 MG/DL (ref 0.67–1.17)
CREAT SERPL-MCNC: 0.75 MG/DL (ref 0.67–1.17)
CREAT SERPL-MCNC: 0.79 MG/DL (ref 0.67–1.17)
DECLARED MEDICATIONS: ABNORMAL
DRUGS BLD SCN NOM: ABNORMAL
DRUGS FLD: ABNORMAL
DRUGS FLD: ABNORMAL
DRUGS FLD: POSITIVE
EGFRCR SERPLBLD CKD-EPI 2021: >90 ML/MIN/1.73M2
ERYTHROCYTE [DISTWIDTH] IN BLOOD BY AUTOMATED COUNT: 17.3 % (ref 10–15)
ERYTHROCYTE [DISTWIDTH] IN BLOOD BY AUTOMATED COUNT: 17.7 % (ref 10–15)
ETHANOL BLD-MCNC: NEGATIVE GM/DL
FENTANYL IA: ABNORMAL NG/ML
GABAPENTIN IA: ABNORMAL UG/ML
GLUCOSE BLDC GLUCOMTR-MCNC: 151 MG/DL (ref 70–99)
GLUCOSE BLDC GLUCOMTR-MCNC: 151 MG/DL (ref 70–99)
GLUCOSE BLDC GLUCOMTR-MCNC: 171 MG/DL (ref 70–99)
GLUCOSE BLDC GLUCOMTR-MCNC: 171 MG/DL (ref 70–99)
GLUCOSE BLDC GLUCOMTR-MCNC: 172 MG/DL (ref 70–99)
GLUCOSE BLDC GLUCOMTR-MCNC: 181 MG/DL (ref 70–99)
GLUCOSE SERPL-MCNC: 165 MG/DL (ref 70–99)
GLUCOSE SERPL-MCNC: 172 MG/DL (ref 70–99)
GLUCOSE SERPL-MCNC: 189 MG/DL (ref 70–99)
HCO3 BLDV-SCNC: 30 MMOL/L (ref 21–28)
HCO3 BLDV-SCNC: 31 MMOL/L (ref 21–28)
HCO3 SERPL-SCNC: 26 MMOL/L (ref 22–29)
HCO3 SERPL-SCNC: 27 MMOL/L (ref 22–29)
HCO3 SERPL-SCNC: 28 MMOL/L (ref 22–29)
HCT VFR BLD AUTO: 23.8 % (ref 40–53)
HCT VFR BLD AUTO: 24.1 % (ref 40–53)
HGB BLD-MCNC: 7.6 G/DL (ref 13.3–17.7)
HGB BLD-MCNC: 7.9 G/DL (ref 13.3–17.7)
MAGNESIUM SERPL-MCNC: 2.3 MG/DL (ref 1.7–2.3)
MCH RBC QN AUTO: 29.6 PG (ref 26.5–33)
MCH RBC QN AUTO: 30.5 PG (ref 26.5–33)
MCHC RBC AUTO-ENTMCNC: 31.9 G/DL (ref 31.5–36.5)
MCHC RBC AUTO-ENTMCNC: 32.8 G/DL (ref 31.5–36.5)
MCV RBC AUTO: 93 FL (ref 78–100)
MCV RBC AUTO: 93 FL (ref 78–100)
MEPERIDINE SERPLBLD-MCNC: NEGATIVE NG/ML
METHADONE SAL CFM-MCNC: NEGATIVE NG/ML
O2/TOTAL GAS SETTING VFR VENT: 30 %
O2/TOTAL GAS SETTING VFR VENT: 30 %
OPIATES SPEC-MCNC: NEGATIVE NG/ML
OXYCODONE SERPLBLD SCN-MCNC: ABNORMAL NG/ML
OXYHGB MFR BLDV: 59 % (ref 70–75)
OXYHGB MFR BLDV: 60 % (ref 70–75)
PCO2 BLDV: 39 MM HG (ref 40–50)
PCO2 BLDV: 41 MM HG (ref 40–50)
PCP SPEC-MCNC: NEGATIVE NG/ML
PH BLDV: 7.49 [PH] (ref 7.32–7.43)
PH BLDV: 7.49 [PH] (ref 7.32–7.43)
PHOSPHATE SERPL-MCNC: 2.5 MG/DL (ref 2.5–4.5)
PLATELET # BLD AUTO: 151 10E3/UL (ref 150–450)
PLATELET # BLD AUTO: 178 10E3/UL (ref 150–450)
PO2 BLDV: 32 MM HG (ref 25–47)
PO2 BLDV: 33 MM HG (ref 25–47)
POTASSIUM SERPL-SCNC: 2.9 MMOL/L (ref 3.4–5.3)
POTASSIUM SERPL-SCNC: 3.4 MMOL/L (ref 3.4–5.3)
POTASSIUM SERPL-SCNC: 3.5 MMOL/L (ref 3.4–5.3)
POTASSIUM SERPL-SCNC: 3.5 MMOL/L (ref 3.4–5.3)
PROPOXYPH SPEC-MCNC: NEGATIVE NG/ML
RBC # BLD AUTO: 2.57 10E6/UL (ref 4.4–5.9)
RBC # BLD AUTO: 2.59 10E6/UL (ref 4.4–5.9)
SAO2 % BLDV: 60.5 % (ref 70–75)
SAO2 % BLDV: 61.4 % (ref 70–75)
SCANNED LAB RESULT: NORMAL
SODIUM SERPL-SCNC: 147 MMOL/L (ref 135–145)
SODIUM SERPL-SCNC: 148 MMOL/L (ref 135–145)
SODIUM SERPL-SCNC: 150 MMOL/L (ref 135–145)
TEST NAME: NORMAL
TRAMADOL BLD-MCNC: NEGATIVE NG/ML
WBC # BLD AUTO: 7.9 10E3/UL (ref 4–11)
WBC # BLD AUTO: 8 10E3/UL (ref 4–11)

## 2025-01-08 PROCEDURE — 250N000011 HC RX IP 250 OP 636: Performed by: STUDENT IN AN ORGANIZED HEALTH CARE EDUCATION/TRAINING PROGRAM

## 2025-01-08 PROCEDURE — 80048 BASIC METABOLIC PNL TOTAL CA: CPT

## 2025-01-08 PROCEDURE — 87040 BLOOD CULTURE FOR BACTERIA: CPT | Performed by: PHYSICIAN ASSISTANT

## 2025-01-08 PROCEDURE — 250N000013 HC RX MED GY IP 250 OP 250 PS 637

## 2025-01-08 PROCEDURE — 80048 BASIC METABOLIC PNL TOTAL CA: CPT | Performed by: NURSE PRACTITIONER

## 2025-01-08 PROCEDURE — 250N000013 HC RX MED GY IP 250 OP 250 PS 637: Performed by: STUDENT IN AN ORGANIZED HEALTH CARE EDUCATION/TRAINING PROGRAM

## 2025-01-08 PROCEDURE — 87186 SC STD MICRODIL/AGAR DIL: CPT | Performed by: PHYSICIAN ASSISTANT

## 2025-01-08 PROCEDURE — 999N000253 HC STATISTIC WEANING TRIALS

## 2025-01-08 PROCEDURE — 84100 ASSAY OF PHOSPHORUS: CPT | Performed by: NURSE PRACTITIONER

## 2025-01-08 PROCEDURE — 82330 ASSAY OF CALCIUM: CPT | Performed by: PHYSICIAN ASSISTANT

## 2025-01-08 PROCEDURE — 99291 CRITICAL CARE FIRST HOUR: CPT | Mod: 24 | Performed by: ANESTHESIOLOGY

## 2025-01-08 PROCEDURE — 82805 BLOOD GASES W/O2 SATURATION: CPT

## 2025-01-08 PROCEDURE — 250N000013 HC RX MED GY IP 250 OP 250 PS 637: Performed by: PHYSICIAN ASSISTANT

## 2025-01-08 PROCEDURE — 250N000009 HC RX 250: Performed by: STUDENT IN AN ORGANIZED HEALTH CARE EDUCATION/TRAINING PROGRAM

## 2025-01-08 PROCEDURE — 250N000011 HC RX IP 250 OP 636: Mod: JZ | Performed by: PHYSICIAN ASSISTANT

## 2025-01-08 PROCEDURE — 94003 VENT MGMT INPAT SUBQ DAY: CPT

## 2025-01-08 PROCEDURE — 36415 COLL VENOUS BLD VENIPUNCTURE: CPT | Performed by: PHYSICIAN ASSISTANT

## 2025-01-08 PROCEDURE — 94668 MNPJ CHEST WALL SBSQ: CPT

## 2025-01-08 PROCEDURE — 82310 ASSAY OF CALCIUM: CPT | Performed by: NURSE PRACTITIONER

## 2025-01-08 PROCEDURE — 71045 X-RAY EXAM CHEST 1 VIEW: CPT | Mod: 26 | Performed by: RADIOLOGY

## 2025-01-08 PROCEDURE — 250N000011 HC RX IP 250 OP 636: Performed by: NURSE PRACTITIONER

## 2025-01-08 PROCEDURE — 84132 ASSAY OF SERUM POTASSIUM: CPT

## 2025-01-08 PROCEDURE — 250N000011 HC RX IP 250 OP 636: Performed by: INTERNAL MEDICINE

## 2025-01-08 PROCEDURE — 80048 BASIC METABOLIC PNL TOTAL CA: CPT | Performed by: INTERNAL MEDICINE

## 2025-01-08 PROCEDURE — 83735 ASSAY OF MAGNESIUM: CPT | Performed by: STUDENT IN AN ORGANIZED HEALTH CARE EDUCATION/TRAINING PROGRAM

## 2025-01-08 PROCEDURE — 71045 X-RAY EXAM CHEST 1 VIEW: CPT

## 2025-01-08 PROCEDURE — 85014 HEMATOCRIT: CPT | Performed by: NURSE PRACTITIONER

## 2025-01-08 PROCEDURE — 87149 DNA/RNA DIRECT PROBE: CPT | Performed by: PHYSICIAN ASSISTANT

## 2025-01-08 PROCEDURE — 250N000009 HC RX 250

## 2025-01-08 PROCEDURE — 999N000157 HC STATISTIC RCP TIME EA 10 MIN

## 2025-01-08 PROCEDURE — 82805 BLOOD GASES W/O2 SATURATION: CPT | Performed by: NURSE PRACTITIONER

## 2025-01-08 PROCEDURE — 71045 X-RAY EXAM CHEST 1 VIEW: CPT | Mod: 77

## 2025-01-08 PROCEDURE — 250N000013 HC RX MED GY IP 250 OP 250 PS 637: Performed by: NURSE PRACTITIONER

## 2025-01-08 PROCEDURE — 200N000002 HC R&B ICU UMMC

## 2025-01-08 PROCEDURE — 94640 AIRWAY INHALATION TREATMENT: CPT | Mod: 76

## 2025-01-08 PROCEDURE — 94640 AIRWAY INHALATION TREATMENT: CPT

## 2025-01-08 RX ORDER — POTASSIUM CHLORIDE 29.8 MG/ML
20 INJECTION INTRAVENOUS ONCE
Status: COMPLETED | OUTPATIENT
Start: 2025-01-08 | End: 2025-01-08

## 2025-01-08 RX ORDER — ACETAMINOPHEN 650 MG/1
650 SUPPOSITORY RECTAL EVERY 8 HOURS PRN
Status: DISCONTINUED | OUTPATIENT
Start: 2025-01-08 | End: 2025-01-25 | Stop reason: HOSPADM

## 2025-01-08 RX ORDER — ACETAMINOPHEN 325 MG/1
650 TABLET ORAL EVERY 4 HOURS PRN
Status: DISCONTINUED | OUTPATIENT
Start: 2025-01-08 | End: 2025-01-08

## 2025-01-08 RX ORDER — POTASSIUM CHLORIDE 29.8 MG/ML
20 INJECTION INTRAVENOUS
Status: COMPLETED | OUTPATIENT
Start: 2025-01-08 | End: 2025-01-09

## 2025-01-08 RX ORDER — ACETAMINOPHEN 325 MG/1
650 TABLET ORAL EVERY 8 HOURS PRN
Status: DISCONTINUED | OUTPATIENT
Start: 2025-01-08 | End: 2025-01-25 | Stop reason: HOSPADM

## 2025-01-08 RX ORDER — ACETAMINOPHEN 650 MG/1
650 SUPPOSITORY RECTAL EVERY 4 HOURS PRN
Status: DISCONTINUED | OUTPATIENT
Start: 2025-01-08 | End: 2025-01-08

## 2025-01-08 RX ORDER — POTASSIUM CHLORIDE 29.8 MG/ML
20 INJECTION INTRAVENOUS
Status: COMPLETED | OUTPATIENT
Start: 2025-01-08 | End: 2025-01-08

## 2025-01-08 RX ADMIN — IPRATROPIUM BROMIDE AND ALBUTEROL SULFATE 3 ML: .5; 3 SOLUTION RESPIRATORY (INHALATION) at 15:14

## 2025-01-08 RX ADMIN — DEXMEDETOMIDINE HYDROCHLORIDE 0.6 MCG/KG/HR: 4 INJECTION, SOLUTION INTRAVENOUS at 08:17

## 2025-01-08 RX ADMIN — DEXMEDETOMIDINE HYDROCHLORIDE 0.7 MCG/KG/HR: 4 INJECTION, SOLUTION INTRAVENOUS at 21:41

## 2025-01-08 RX ADMIN — Medication 10 MG: at 21:18

## 2025-01-08 RX ADMIN — BACLOFEN 10 MG: 10 TABLET ORAL at 23:38

## 2025-01-08 RX ADMIN — BACLOFEN 10 MG: 10 TABLET ORAL at 07:32

## 2025-01-08 RX ADMIN — PANTOPRAZOLE SODIUM 40 MG: 40 INJECTION, POWDER, FOR SOLUTION INTRAVENOUS at 07:32

## 2025-01-08 RX ADMIN — CALCIUM GLUCONATE 1 G: 20 INJECTION, SOLUTION INTRAVENOUS at 05:01

## 2025-01-08 RX ADMIN — IPRATROPIUM BROMIDE AND ALBUTEROL SULFATE 3 ML: .5; 3 SOLUTION RESPIRATORY (INHALATION) at 20:08

## 2025-01-08 RX ADMIN — ROSUVASTATIN CALCIUM 40 MG: 20 TABLET, FILM COATED ORAL at 07:31

## 2025-01-08 RX ADMIN — OXYCODONE HYDROCHLORIDE 10 MG: 10 TABLET ORAL at 02:42

## 2025-01-08 RX ADMIN — INSULIN ASPART 2 UNITS: 100 INJECTION, SOLUTION INTRAVENOUS; SUBCUTANEOUS at 15:47

## 2025-01-08 RX ADMIN — Medication 60 ML: at 07:32

## 2025-01-08 RX ADMIN — CHLORHEXIDINE GLUCONATE 15 ML: 1.2 SOLUTION ORAL at 21:18

## 2025-01-08 RX ADMIN — CHLORHEXIDINE GLUCONATE 15 ML: 1.2 SOLUTION ORAL at 07:32

## 2025-01-08 RX ADMIN — ASPIRIN 81 MG CHEWABLE TABLET 162 MG: 81 TABLET CHEWABLE at 07:31

## 2025-01-08 RX ADMIN — POTASSIUM CHLORIDE 20 MEQ: 29.8 INJECTION INTRAVENOUS at 07:43

## 2025-01-08 RX ADMIN — HEPARIN SODIUM 5000 UNITS: 5000 INJECTION, SOLUTION INTRAVENOUS; SUBCUTANEOUS at 15:38

## 2025-01-08 RX ADMIN — INSULIN GLARGINE 20 UNITS: 100 INJECTION, SOLUTION SUBCUTANEOUS at 07:48

## 2025-01-08 RX ADMIN — POTASSIUM & SODIUM PHOSPHATES POWDER PACK 280-160-250 MG 1 PACKET: 280-160-250 PACK at 08:17

## 2025-01-08 RX ADMIN — DEXMEDETOMIDINE HYDROCHLORIDE 1.2 MCG/KG/HR: 4 INJECTION, SOLUTION INTRAVENOUS at 02:09

## 2025-01-08 RX ADMIN — ACETYLCYSTEINE 2 ML: 200 SOLUTION ORAL; RESPIRATORY (INHALATION) at 15:14

## 2025-01-08 RX ADMIN — Medication 15 ML: at 07:31

## 2025-01-08 RX ADMIN — INSULIN ASPART 2 UNITS: 100 INJECTION, SOLUTION INTRAVENOUS; SUBCUTANEOUS at 04:17

## 2025-01-08 RX ADMIN — DEXMEDETOMIDINE HYDROCHLORIDE 0.7 MCG/KG/HR: 4 INJECTION, SOLUTION INTRAVENOUS at 14:52

## 2025-01-08 RX ADMIN — HEPARIN SODIUM 5000 UNITS: 5000 INJECTION, SOLUTION INTRAVENOUS; SUBCUTANEOUS at 23:38

## 2025-01-08 RX ADMIN — OLANZAPINE 5 MG: 5 TABLET, ORALLY DISINTEGRATING ORAL at 11:00

## 2025-01-08 RX ADMIN — INSULIN ASPART 2 UNITS: 100 INJECTION, SOLUTION INTRAVENOUS; SUBCUTANEOUS at 11:06

## 2025-01-08 RX ADMIN — HEPARIN SODIUM 5000 UNITS: 5000 INJECTION, SOLUTION INTRAVENOUS; SUBCUTANEOUS at 07:32

## 2025-01-08 RX ADMIN — INSULIN ASPART 1 UNITS: 100 INJECTION, SOLUTION INTRAVENOUS; SUBCUTANEOUS at 21:24

## 2025-01-08 RX ADMIN — POTASSIUM CHLORIDE 20 MEQ: 29.8 INJECTION INTRAVENOUS at 06:28

## 2025-01-08 RX ADMIN — OXYCODONE HYDROCHLORIDE 10 MG: 10 TABLET ORAL at 11:00

## 2025-01-08 RX ADMIN — ACETYLCYSTEINE 2 ML: 200 SOLUTION ORAL; RESPIRATORY (INHALATION) at 07:50

## 2025-01-08 RX ADMIN — BACLOFEN 10 MG: 10 TABLET ORAL at 15:38

## 2025-01-08 RX ADMIN — POTASSIUM CHLORIDE 20 MEQ: 29.8 INJECTION INTRAVENOUS at 23:08

## 2025-01-08 RX ADMIN — SERTRALINE HYDROCHLORIDE 200 MG: 100 TABLET ORAL at 07:31

## 2025-01-08 RX ADMIN — ACETAMINOPHEN 650 MG: 325 TABLET, FILM COATED ORAL at 23:37

## 2025-01-08 RX ADMIN — ACETYLCYSTEINE 2 ML: 200 SOLUTION ORAL; RESPIRATORY (INHALATION) at 11:31

## 2025-01-08 RX ADMIN — POTASSIUM & SODIUM PHOSPHATES POWDER PACK 280-160-250 MG 1 PACKET: 280-160-250 PACK at 11:00

## 2025-01-08 RX ADMIN — INSULIN ASPART 1 UNITS: 100 INJECTION, SOLUTION INTRAVENOUS; SUBCUTANEOUS at 23:53

## 2025-01-08 RX ADMIN — ACETYLCYSTEINE 2 ML: 200 SOLUTION ORAL; RESPIRATORY (INHALATION) at 20:08

## 2025-01-08 RX ADMIN — INSULIN ASPART 2 UNITS: 100 INJECTION, SOLUTION INTRAVENOUS; SUBCUTANEOUS at 07:47

## 2025-01-08 RX ADMIN — HYDROXYZINE HYDROCHLORIDE 50 MG: 50 TABLET ORAL at 23:37

## 2025-01-08 RX ADMIN — IPRATROPIUM BROMIDE AND ALBUTEROL SULFATE 3 ML: .5; 3 SOLUTION RESPIRATORY (INHALATION) at 07:49

## 2025-01-08 RX ADMIN — POTASSIUM CHLORIDE 20 MEQ: 29.8 INJECTION INTRAVENOUS at 12:55

## 2025-01-08 RX ADMIN — IPRATROPIUM BROMIDE AND ALBUTEROL SULFATE 3 ML: .5; 3 SOLUTION RESPIRATORY (INHALATION) at 11:30

## 2025-01-08 ASSESSMENT — ACTIVITIES OF DAILY LIVING (ADL)
ADLS_ACUITY_SCORE: 53
ADLS_ACUITY_SCORE: 53
ADLS_ACUITY_SCORE: 55
ADLS_ACUITY_SCORE: 53
ADLS_ACUITY_SCORE: 55
ADLS_ACUITY_SCORE: 53
ADLS_ACUITY_SCORE: 53
ADLS_ACUITY_SCORE: 55
ADLS_ACUITY_SCORE: 53
ADLS_ACUITY_SCORE: 55
ADLS_ACUITY_SCORE: 53

## 2025-01-08 NOTE — PROGRESS NOTES
Neuro: restless majority of shift/ agitated. PERRLA. Does not follow commands (can wiggle toes to command). Bites down on ETT/swabs. Pt. . Dex on. Dilaudid gtt. PRN bould of dilaudid given, PRN atarax,, oxy, and zyprexa given. Propofol off since AM. CT w/o contrast of head completed today d/t delirium.  CV: SR 50-60ss. MAP goal > 65, SBP < 140. Tmax 99.0 F. Levo off since AM.  Pulm: Vent Mode: CMV 30%/14/400/8. PST for 7 hours today on 10/8 and 8/8.  GI: NG @ 63. TF goal@ 55 mL/hr. 400mL [id number] FWF. Loose liquid BM x4. Rectal tube placed.  : Rodriguez with good urine output. x1 40mg lasix given w/ good response.  Skin: x4 CT to suction  Scabs on chin and right temple.Right temple.  Blister on first finger of right hand.  Blanchable erythema on sacrum/coccyx.  Healed right BKA. Significant bruising post ECMO sites  Drips:  Norepi off  Dilaudid 0.4  Dex: 1.2  Labs: K+ (replaced multiple times), Na remains high.  Lowell Fagan RN on 1/7/2025 at 6:56 PM

## 2025-01-08 NOTE — PROGRESS NOTES
Neuro:  PERRLA. Following commands. TMAX 38.3. Oxy q4 PRN for pain.     CV: SR 70s. MAP goal > 65, SBP < 140. Tmax 38.3. x4 chest tubes. Right pleural chest tube to water seal per order.    Pulm: CMV 30%/14/400/8. Pressure support x3 today - failed first, 2nd and 3rd improved but with continued tachypnea rates 30-40.     GI: NG @ 63. TF goal@ 55 mL/hr. 150 q1 hour FWF. Rectal tube with watery output.     : Rodriguez placed today per order after urinary retention.     Skin:  Scabs/bruising scattered.  Blister on first finger of right hand.  Blanchable erythema on sacrum/coccyx.    Drips:  Dex: 0.7    Labs: Replaced K+, BMP q8 with increased FWF for hypernatremia      Intake/Output Summary (Last 24 hours) at 1/8/2025 1638  Last data filed at 1/8/2025 1600  Gross per 24 hour   Intake 4638.3 ml   Output 3080 ml   Net 1558.3 ml       Documenting RN assumed care of this patient from Date: 1/8/25 Time: 0181-3394.  For vital signs, drip titrations, and complete assessments, please see documentation flowsheets; assessments charted by exception.  All ICU standards of care were followed.  Any critical lab values were called to MD within 5 minutes of writer receiving them from lab.     Mika Wing, RN, BSN

## 2025-01-08 NOTE — PROGRESS NOTES
CLINICAL NUTRITION SERVICES - REASSESSMENT NOTE   Nutrition Prescription    Malnutrition Status:    Severe malnutrition in the context of acute on chronic illness    Recommendations already ordered by Registered Dietitian (RD):  Continue EN support as ordered:  Pivot 1.5 Lalito (or equivalent) @ goal of  55ml/hr  (1320ml/day) + 1 Prosource TF20 provides: 2060 kcals (26 kcal/kg), 144 g PRO (1.8 g pro/kg), 990 ml free H20, 227 g CHO, and 9 g fiber daily.     Future/Additional Recommendations:  Monitor ongoing tolerance to EN support.  Monitor stool output, wt trends.  Provide post-CABG nutrition education as appropriate.      EVALUATION OF THE PROGRESS TOWARD GOALS   Diet: NPO    Enteral Access: NDT    FWF: 150 hourly    Nutrition Support: EN  1/3-1/6: Pivot 1.5 @ 20 mL/hr + 1 pkt ProSource daily - team didn't want ppFT, hopeful he extubates soon but wanted him to get some feeds until then     1/6-Current: Pivot 1.5 Lalito (or equivalent) @ goal of  55ml/hr  (1320ml/day) + 1 Prosource TF20 provides: 2060 kcals (26 kcal/kg), 144 g PRO (1.8 g pro/kg), 990 ml free H20, 227 g CHO, and 9 g fiber daily.    Enteral Intake: Reached goal rate @ 1100 on 1/7. Tolerating TF at goal rate.      NEW FINDINGS   -Per visit with pt today (1/8): Pt intubated but alert to writer, making eye contact and seemingly listening to writer's explanation of reason for visit and NFPE. Writer notified RN of TF pump error coincidentally alarming upon writer's visit (cartridge reloaded).     -Wt trends: Remains up compared to PTA.  Date/Time Weight Weight Method   01/07/25 0600 89.4 kg (197 lb 1.5 oz) Bed scale   01/06/25 0007 90.9 kg (200 lb 6.4 oz) --   01/05/25 0400 88.2 kg (194 lb 7.1 oz) Bed scale   01/03/25 0600 85.6 kg (188 lb 11.4 oz) Bed scale   01/02/25 0200 78.6 kg (173 lb 4.5 oz) Bed scale   01/01/25 0600 76.6 kg (168 lb 14 oz) --   12/31/24 1114 76 kg (167 lb 8.8 oz) Bed scale      -Labs: Reviewed, notable for:   Na+ 148 (H, trended down)  BUN  26 (H, trended down)    -Cardio: S/p CABG x4 on 1/2. VA ECMO decannulation 1/3.     -GI: Went without BM from 12/31-1/4 (finally had BM on 1/5). Rectal tube placed 1/5 and replaced yesterday (1/7) with 175 mL output yesterday.    -Respiratory: Intubated on mechanical ventilation.    -Skin: WOCN not following. Skin beneath nasal bridle was inspected; appears appropriate, with no skin breakdown or tension on the bridle string.      -Meds & Vitamin/Mineral Supplementation: Reviewed, notable for:   20 units Lantus qAM  High dose sliding scale insulin   Liquid MVI/mineral    UPDATED ASSESSED NUTRITION NEEDS   Dosing Weight: 79 kg (Actual BW)   Estimated Energy Needs: 6221-0949 kcals/day (25 - 30 kcals/kg)   Justification:  Maintenance  Estimated Protein Needs: 119-158 grams protein/day (1.5 - 2 grams of pro/kg)   Justification:  Increased needs, critical illness  Estimated Fluid Needs: Per provider pending fluid status     MALNUTRITION  % Intake: </= 50% for >/= 5 days (severe) - on average  % Weight Loss: > 10% in 6 months (severe) - from PTA  Subcutaneous Fat Loss: Facial region:  Mild and Upper arm:  Mild  Muscle Loss: Temporal:  Moderate-Severe, Thoracic region (clavicle, acromium bone, deltoid, trapezius, pectoral):  Moderate, Upper arm (bicep, tricep):  Mild, Lower arm  (forearm):  Mild, and Posterior calf (L - has R BKA):  Mild-moderate  Fluid Accumulation/Edema: Mild (2+ edema per RN flowsheets)  Malnutrition Diagnosis: Severe malnutrition in the context of acute on chronic illness    Previous Goals   Diet adv v nutrition support within 48 hours of admission to ICU.   Evaluation: Not met (started closer to 72 hours, after ECMO decann)    Previous Nutrition Diagnosis  Inadequate oral intake related to intubation as evidenced by NPO without nutrition support.   Evaluation: Improving    CURRENT NUTRITION DIAGNOSIS  Inadequate oral intake related to NPO status in setting of intubation as evidenced by ongoing need for  EN support to meet full nutrition needs at this time.       INTERVENTIONS  Implementation  -Collaboration with other providers - Rounds  -Enteral Nutrition - Continue    Goals  Total avg nutritional intake to meet a minimum of 25 kcal/kg and 1.5 g PRO/kg daily (per dosing wt 79 kg).    Monitoring/Evaluation  Progress toward goals will be monitored and evaluated per protocol.     Liz Garcia RD, LD  Available on [employer] - can search by name or unit Dietitian  **Clinical Nutrition is no longer available via pager

## 2025-01-08 NOTE — PROGRESS NOTES
[facility]    ICU Progress Note       Date of Admission:  12/31/2024    Assessment: Critical Care   Erwin Aguilar is a 64 year old male with a past medical history of DM2, HTN, PVD, and neuropathic pain who initially presented on 12/31/24 to the ED with arm pain, vomiting, and diarrhea; ACS and ST depression. He subsequently had VT arrest with 1 round of CPR, epinephrine, defibrillation. After ROSC had afib with RVR. Became hypotensive and was subsequently cannulated for VA ECMO on 1/2/25, s/p CABG x4, and arrived to the ICU at that time. He was de cannulated on 1/3/25 at bedside. ICU course complicated by large R ptx seen on chest CT on 1/4/25, CVTS Fellow placed chest tube at bedside with improvement of pneumothorax.       MAJOR CHANGES/UPDATES  - ON, discontinued dilaudid gtt, on precedex 0.6  - PST today, wean towards extubation  - VBG during PST  - discontinue CTX  - Still hypernatremic,increased FWF to 150mL q1hr, q8hr Na checks  - will discuss with surgery removal of capped lines  - R pleural CT to waterseal    Plan: Critical Care     PLAN:  Neuro/ pain/ sedation:  # Acute postoperative pain  # Chronic pain with opioid dependence  # Neuropathy, PTA  # Sedation while on mechanical ventilation   # Agitation  # Delrium   Analgesia   - Gtt Dilaudid off   - Scheduled: baclofen    - PRN: oxycodone  Sedation    - Gtt: Precedex gtt wean as able   - Scheduled: Melaontin 10 mg at bedtime    - PRN: hydroxyzine, olanzapine  RASS goal 0 to -1     # Generalized Anxiety, PTA   - Continue PTA Sertraline  - Restart PTA PRN hydroxyzine      # Encephalopathy of unclear etiology   - Prior to procedure  - 1/1 vEEG without seizure activity (severe encephalopathy) and 12/31, 1/4, and 1/7 CTH negative for acute findings.     Pulmonary care:   # Acute Hypoxic Respiratory failure   # Respiratory alkalosis 2:2 agitation - improved   # R tension pneumothorax   # Aspiration Pneumonia,  Klebsiella  FiO2 (%): 30 %, Resp: 21, Vent Mode: CMV/AC, Resp Rate (Set): 14 breaths/min, Tidal Volume (Set, mL): 400 mL, PEEP (cm H2O): 8 cmH2O, Pressure Support (cm H2O): 8 cmH2O, Resp Rate (Set): 14 breaths/min, Tidal Volume (Set, mL): 400 mL, PEEP (cm H2O): 8 cmH2O   - ON of 1/4 CT C/A/P ordered for persistently elevated lactate which revealed large R tension pneumothorax. CVTS notified and fellow placed chest tube at bedside this morning with improvement of pneumothorax.   - PST today potentially extubate    - PRN nebs for secretion management   - VAP bundle while intubated  - Goal SpO2 > 92%  - CXR daily    - CXR this morning, per my review, similar to previous   - Monitor CT output, WS today  - CTX discontinued    Cardiovascular:    # Mixed Shock, cardiogenic, vasoplegic  # HTN, PTA   # VT Cardiac Arrest on VA ECMO (01/01/24 - 1/3)  # CAD s/p CABG x4  # Dyslipidemia, PTA   # Cardiomyopathy   1/5 TTE Normal LVEF 55-60%, normal RV function   - Monitor hemodynamics closely. Goal MAP >65, SBP <140   - NE off this morning, if continues to be off for 24 hrs, discontinue   - Aspirin 81mg daily   - Crestor 40mg daily  - A/V wires capped - removal per surgery  - Hold PTA apixaban, Dyazide, propanolol     GI care / Nutrition:   # At risk for protein calorie malnutrition   # Constipation   - Diet: TF @ goal via NJ   - PPI  - Last BM: 1/6. Bowel reg PRN      Renal / Fluids / Electrolytes:   # Acute Kidney Injury 2/2 ATN - improving   # Hypokalemia   # Hypernatremia  # Hypervolemia  BL creat appears to be ~ 0.6-0.8  - Scheduled 40 mEQ of KCl q 12 hr x2 doses with diuresis   - Na+ is 150, free water deficit calculated to be 3.4 L   - Strict I/O, daily weights, avoid/limit nephrotoxins  - Pt's K+ this morning 3.7, replaced, recheck this afternoon   - Na+ up to 150, increased FWF to 150 ml q1hr with q8hr rechecks  - Replete lytes PRN per protocol        Endocrine:    # Stress hyperglycemia  # Type 2 Diabetes Mellitus, PTA    - Preop A1c 5.7  - Transition from insulin gtt to sliding scale insulin   - Goal BG <180 for optimal healing  - Lantus 20 units daily (PTA 26 units daily)    ID / Antibiotics:  # Stress induced leukocytosis  # Aspiration Pneumonia, Klebsiella  - Broadened abx from CTX (12/31 to 1/4) to zosyn 1/4-1/6 will narrow back to CTX today EOT TBD   - Monitor fever curve, WBC, and inflammatory markers as appropriate  - Pt afebrile with T max of 99.9, no leukocytosis with WBC of 7.9  -discontinued CTX    Cultures  1/4 UA non-infectious appearing negative LE, nitrites, WBCs, and bacteria   1/4 Sputum cx gram stain 1+ Klebsiella pneumoniae   1/3 blood cx NGTD @ 2 day   12/31 sputum: Klebsiella pneumonia with resistance to ampicillin   12/31 MRSA and blood cultures negative     Heme:     # Acute blood loss anemia  # Acute thrombocytopenia  No s/sx active bleeding  - Daily CBC   - Hgb 7.6 today   - Hgb goal > 7.0, transfuse PRN   - DVT ppx: SQH      MSK / Skin:  # Sternotomy   # Surgical Incision  - Sternal precautions, postop incision management protocol  - PT/OT/CR    #PVD, PTA   - S/p right BKA, old and stump healed     Prophylaxis:     - DVT: Mechanical  - GI: PPI     Lines / Tubes / Drains:  - ETT  - CTs x 1, R pleural chest tube   - A/V wires  - OG     Disposition: CVICU     Will discuss today's plan with patient's family      Patient seen, findings and plan discussed with CVICU staff and CVTS staff.     Total Critical Care time spent by me, excluding procedures, was 45 minutes.        Vidal Leon MD  [facility]  Securely message with [employer] (more info)  Text page via [employer] Paging/Directory     Clinically Significant Risk Factors        # Hypokalemia: Lowest K = 3.1 mmol/L in last 2 days, will replace as needed  # Hypernatremia: Highest Na = 152 mmol/L in last 2 days, will monitor as appropriate  # Hyperchloremia: Highest Cl = 121 mmol/L in last 2 days, will monitor as  "appropriate      # Hypocalcemia: Lowest iCa = 4.3 mg/dL in last 2 days, will monitor and replace as appropriate     # Hypoalbuminemia: Lowest albumin = 2 g/dL at 1/2/2025  5:22 PM, will monitor as appropriate     # Thrombocytopenia: Lowest platelets = 102 in last 2 days, will monitor for bleeding   # Hypertension: Noted on problem list     # Acute Hypoxic Respiratory Failure: Documented O2 saturation < 90%. Continue supplemental oxygen as needed  # Acute Hypercapnic Respiratory Failure: based on arterial blood gas results.  Continue supplemental oxygen and ventilatory support as indicated.  # Acute Hypercapnic Respiratory Failure: based on venous blood gas results.  Continue supplemental oxygen and ventilatory support as indicated.         # Overweight: Estimated body mass index is 27.49 kg/m  as calculated from the following:    Height as of this encounter: 1.803 m (5' 11\").    Weight as of this encounter: 89.4 kg (197 lb 1.5 oz).      # Asthma: noted on problem list    # History of CABG: noted on surgical history       ____________________________________________________________________    Interval History   Intubated but awake and responsive    Intake/Output  I/O last 3 completed shifts:  In: 4704.5 [I.V.:1149.5; NG/GT:2275]  Out: 4949 [Urine:4400; Stool:175; Chest Tube:374]      Physical Exam   Vital Signs: Temp: 99  F (37.2  C) Temp src: Axillary BP: 133/59 Pulse: 78   Resp: 21 SpO2: 100 % O2 Device: Mechanical Ventilator    Weight: 197 lbs 1.46 oz    Neuro: Intubated. Sedated. Eyes tracking today. Per RN starting to follow commands with wiggling toes. All extremities move spontaneously.   HEENT: Normocephalic, atraumatic. PERRL, and nonicteric.   CV: RRR on monitor, S1S2, all extremities well perfused   Respiratory: Normal respiratory effort on CMV RR 14, , PEEP 8 , FiO2 30 %. Equal chest rise b/l. Mediastinal chest tubes with 204 ml of serosanguinous output/24 hours without airleak noted. R pleural with " 170 ml of serosanguinous output no airleak noted.    GI: Abdomen soft. Non-distended.   : Voiding appropriately  MSK: Right BKA. Moves all extremities well with normal appearing strength. Sensation intact in all upper/lower extremities.     Skin: Sternal incision open to air, no drainage, surrounding erythema or induration noted. Large hematoma on L groin near previous ECMO cannulation site.     Data   I reviewed all medications, new labs and imaging results over the last 24 hours.  Arterial Blood Gases   Recent Labs   Lab 01/06/25  0323 01/06/25  0014 01/05/25 1953 01/05/25  1423   PH 7.47* 7.49* 7.51* 7.54*   PCO2 38 37 35 32*   PO2 141* 114* 144* 109*   HCO3 28 28 28 27       Complete Blood Count   Recent Labs   Lab 01/08/25  0354 01/07/25  1535 01/07/25  0423 01/06/25  1454   WBC 7.9 8.8 9.3 9.9   HGB 7.6* 7.7* 8.0* 7.7*    146* 136* 102*       Basic Metabolic Panel  Recent Labs   Lab 01/08/25  0403 01/08/25  0354 01/07/25  2342 01/07/25  2132 01/07/25  1951 01/07/25  1535 01/07/25  1046 01/07/25  1045 01/07/25  0429 01/07/25  0423   NA  --  150*  --   --   --  152*  --  152*  --  149*   POTASSIUM  --  3.4  --  3.4  --  3.2*  3.2*  --  3.1*  --  3.5   CHLORIDE  --  114*  --   --   --  114*  --  114*  --  113*   CO2  --  27  --   --   --  29  --  28  --  27   BUN  --  27.0*  --   --   --  36.4*  --  39.7*  --  41.3*   CR  --  0.79  --   --   --  1.03  --  1.04  --  1.04   * 189* 199*  --  181* 136*  144*   < > 152*   < > 174*    < > = values in this interval not displayed.       Liver Function Tests  Recent Labs   Lab 01/04/25  0216 01/03/25  0336 01/02/25  2155 01/02/25  2024 01/02/25  1724 01/02/25  1722   * 165*  --  175*  --  112*   * 65  --  62  --  33   ALKPHOS 66 54  --  59  --  52   BILITOTAL 0.7 0.5  --  0.9  --  0.8   ALBUMIN 2.4* 2.4*  --  2.3*  --  2.0*   INR  --  1.29* 1.42* 1.43* 1.59*  --        Pancreatic Enzymes  No lab results found in last 7  days.      Coagulation Profile  Recent Labs   Lab 01/03/25  1124 01/03/25  0336 01/02/25  2155 01/02/25 2024 01/02/25  1724   INR  --  1.29* 1.42* 1.43* 1.59*   PTT 34 35 39* 47* 71*       IMAGING:  Recent Results (from the past 24 hours)   CT Head w/o Contrast    Narrative    EXAM: CT HEAD W/O CONTRAST  1/7/2025 5:27 PM     HISTORY:  Prolonged encephalopathy despite weaning sedation       COMPARISON:  Head CT 1/3/2025.    TECHNIQUE: Using multidetector thin collimation helical acquisition  technique, axial, coronal and sagittal CT images from the skull base  to the vertex were obtained without intravenous contrast.   (topogram) image(s) also obtained and reviewed.    FINDINGS:  Mildly motion degraded examination. No change in minimal subdural  hemorrhage along the tentorium bilaterally. No new intracranial  hemorrhage. No mass effect or midline shift. No acute loss of  gray-white matter differentiation in the cerebral hemispheres.  Ventricles are proportionate to the cerebral sulci. Clear basal  cisterns. Mild leukoaraiosis, unchanged.    The bony calvaria and the bones of the skull base are normal. Minimal  paranasal sinus mucosal thickening. The mastoid air cells are clear.  Partially visualized enteric and endotracheal tubes. Grossly normal  orbits.       Impression    IMPRESSION: Stable minimal subdural hemorrhage along the tentorium. No  new hemorrhage.     MARYLOU HOOKER MD         SYSTEM ID:  [id number]   XR Chest Port 1 View    Impression    RESIDENT PRELIMINARY INTERPRETATION  Impression:  1. No significant pneumothorax. Stable positioning of bilateral chest  catheters.  2. Stable positioning of support devices.  3. Similar streaky perihilar and bibasilar opacities, favor  atelectasis and/or pulmonary edema.  4. Similar subcutaneous emphysema tracking along the right chest wall.

## 2025-01-08 NOTE — PLAN OF CARE
ICU End of Shift Summary. See flowsheets for vital signs and detailed assessment.    Changes this shift: Rodriguez pulled. Dilaudid turned off and oxy given.     Plan: Extubate in the am      Goal Outcome Evaluation:      Plan of Care Reviewed With: patient    Overall Patient Progress: no changeOverall Patient Progress: no change

## 2025-01-08 NOTE — OP NOTE
DATE OF SERVICE:  1/2/25     PREOPERATIVE DIAGNOSES:  1.  Severe multivessel coronary artery disease.  2.  ST elevation myocardial infarction  3.  Cardiogenic shock  4.  Paroxysmal atrial fibrillation  5.  Refractory VF cardiac arrest s/p institution of veno-arterial extracorporeal membrane oxygenation  6.  Severe bi-ventricular dysfunction with reduced left ventricular ejection fraction (LVEF ~30%)  7.  Type 2 diabetes mellitus  8.  Hypertension  9.  Peripheral arterial disease s/p right below knee amputation     POSTOPERATIVE DIAGNOSES:  1.  Severe multivessel coronary artery disease.  2.  ST elevation myocardial infarction  3.  Cardiogenic shock  4.  Paroxysmal atrial fibrillation  5.  Refractory VF cardiac arrest s/p institution of veno-arterial extracorporeal membrane oxygenation  6.  Severe bi-ventricular dysfunction with reduced left ventricular ejection fraction (LVEF ~30%)  7.  Type 2 diabetes mellitus  8.  Hypertension  9.  Peripheral arterial disease s/p right below knee amputation     PROCEDURE PERFORMED:  1.  Coronary artery bypass grafting x 4   -reversed saphenous vein graft to the right posterior descending coronary artery  -reversed saphenous vein graft to the obtuse marginal branch of the left circumflex coronary artery  -reversed saphenous vein graft to the ramus intermedius coronary artery  -in-situ left internal mammary artery to the left anterior descending coronary artery.  2.  Endoscopic harvest of the greater saphenous vein from the left lower extremity.  3.  Left atrial appendage ligation with a 50mm Atriclip  4.  Left atrial ablation with the Atricure Encompass clamp    SURGEON:  ANTHONY Rosenbaum MD     FELLOW SURGEON:  Hilario Ortiz MD.    RESIDENT SURGEON: Maria Luz Lozada MD    FIRST ASSISTANT: Sonia Fajardo PA-C     ANESTHESIA:  General endotracheal anesthesia.     ESTIMATED BLOOD LOSS:  500 mL     SPECIMEN:  None.    CPB/XC:  168 / 106 minutes     INDICATIONS FOR PROCEDURE:  Erwin DIAZ  Jeff is a 64-year-old male who presented with a ST elevation myocardial infarction and developed refractory ventricular fibrillation/tachycardia ultimately requiring cannulation for peripheral veno-arterial extracorporeal membrane oxygenation. Coronary angiography demonstrated severe multivessel coronary artery disease. Echocardiography demonstrated severely reduced left ventricular function and no significant valvular abnormality. High risk coronary artery bypass grafting was recommended. The patients significant other understood the risks and benefits of the procedure and wished for him to undergo the operation.     OPERATIVE FINDINGS:      The left internal mammary artery was 2 mm in diameter and had excellent flow.    The ascending aorta was free of calcified plaque.     The right posterior descending coronary artery was 2 mm in diameter and free of disease at the site of anastomosis.   The obtuse marginal branch of the left circumflex coronary artery was 2 mm in diameter and free of disease at the site of anastomosis.   The ramus intermedius coronary artery was 2 mm in diameter and free of disease at the site anastomosis.    The left anterior descending coronary artery was 2 mm in diameter and free of disease at the site of anastomosis.    After reperfusion, sinus rhythm was restored.    Left ventricular function was severely reduced preoperatively and slightly improved on low-flow bypass with moderate/high doses of inotropic support.     OPERATIVE DESCRIPTION IN DETAIL:  After obtaining informed consent, the patient was brought to the operating room and placed in the supine position on the operating room table. Appropriate lines and devices for monitoring were placed by the anesthesia team.  The patient was prepped and draped, and a timeout was performed to confirm the correct patient identity, as well as the procedure to be performed. A median sternotomy was performed and the left internal mammary artery  was harvested in a skeletonized fashion. The greater saphenous vein was harvested from the left lower extremity using endoscopic harvesting by the physician assistant, Sonia Fajardo PA-C.     The patient was given full dose heparin to achieve an activated clotting time over 480 seconds.      Following this, the distal ascending aorta and the right atrium was cannulated. Next, both retrograde and antegrade cardioplegia cannulae were placed. Cardiopulmonary bypass was commenced.     We first began by developing the transverse and oblique sinuses for placement of the EnCompass radiofrequency ablation clamp. The ends of the clamp were guided into each sinus using a red rubber catheter as a guide and both ends were visualized to fully encircle the left sided pulmonary veins on a test clamp. Three pairs of ablations were next performed to create a box lesion set around the left atrium (bilateral pulmonary veins, roof and floor of the left atrium).    Cardioplegia was created. The conduits and targets were inspected. The heart was arrested with 1 liter of cold blood cardioplegia antegrade, followed by 200 ml retrograde. Subsequent maintenance doses of 300 mL of cold retrograde blood cardioplegia were given approximately every 20 minutes or after each distal anastomosis. Electrochemical silence was observed. Topical cold saline slush was applied for additional protection.    A left atrial appendage ligation was performed next with placement of a 50 mm Atriclip at the base of the left atrial appendage.      The following grafts were constructed:    -Reversed saphenous vein graft to the proximal right posterior descending coronary artery in an end-to-side fashion  -Reversed saphenous vein graft to the obtuse marginal branch of the left circumflex coronary artery in an end-to-side fashion  -Reversed saphenous vein graft to the ramus intermedius coronary artery in an end-to-side fashion   -In-situ left internal mammary artery  to the mid left anterior descending coronary artery in an end-to-side fashion    The proximal anastomoses of the vein grafts were constructed on the ascending aorta in an end-to-side fashion using running 6-0 Prolene under a single crossclamp.      The crossclamp was released after a retrograde dose of terminal warm blood cardioplegia was delivered, and the heart was resuscitated. All anastomoses were inspected and determined to be hemostatic. Atrial and ventricular pacing leads were placed and brought out through a separate stab incision. Ventilation was resumed.     We were able to successfully wean the patient from cardiopulmonary bypass on moderate to high doses of inotropes/vasopressors. He however demonstrated signs of potential malperfusion with a rising lactate and tenuous hemodynamics over the course of the case. Due to concern for potential decompensation, we elected to place him back on peripheral veno-arterial extracorporeal membrane oxygenation.    All central cannulation sites were oversewn. Hemostasis was achieved. Chest tubes were placed and brought out through separate stab incisions. The sternal edges were reapposed with multiple stainless steel wires. The skin and soft tissues were closed in multiple layers of running absorbable suture.    All sponge, instrument, and needle counts were correct at the end of the case.      ANTHONY Rosenbaum MD  Cardiothoracic Surgery  Pager [phone]

## 2025-01-08 NOTE — OP NOTE
DATE OF SERVICE: 1/3/25.     PREOPERATIVE DIAGNOSES:  1.  Severe multivessel coronary artery disease.  2.  ST elevation myocardial infarction  3.  Cardiogenic shock  4.  Paroxysmal atrial fibrillation  5.  Refractory VF cardiac arrest s/p institution of veno-arterial extracorporeal membrane oxygenation  6.  Severe bi-ventricular dysfunction with reduced left ventricular ejection fraction (LVEF ~30%)  7.  Type 2 diabetes mellitus  8.  Hypertension  9.  Peripheral arterial disease s/p right below knee amputation  10. S/p coronary artery bypass grafting x 4, left atrial appendage ligation, left atrial ablation     POSTOPERATIVE DIAGNOSES:  1.  Severe multivessel coronary artery disease.  2.  ST elevation myocardial infarction  3.  Cardiogenic shock  4.  Paroxysmal atrial fibrillation  5.  Refractory VF cardiac arrest s/p institution of veno-arterial extracorporeal membrane oxygenation  6.  Severe bi-ventricular dysfunction with reduced left ventricular ejection fraction (LVEF ~30%)  7.  Type 2 diabetes mellitus  8.  Hypertension  9.  Peripheral arterial disease s/p right below knee amputation  10. S/p coronary artery bypass grafting x 4, left atrial appendage ligation, left atrial ablation     PROCEDURE PERFORMED:  1.  VA-ECMO decannulation (right femoral artery / right femoral vein)  2.  Primary repair of right femoral artery cannulation site.  3.  Primary repair of right femoral venous cannulation site     SURGEON:  ANTHONY Rosenbaum MD     RESIDENT SURGEON:  Hilario Ortiz MD.      ANESTHESIA:  General endotracheal anesthesia.    ESTIMATED BLOOD LOSS:  50 mL.     SPECIMEN:  None.     INDICATIONS FOR PROCEDURE: Mr. Erwin Aguilar is a 64 year-old male who had a cardiac arrest and was placed on femoral veno-arterial extracorporeal membrane oxygenation. He underwent coronary artery bypass grafting x 4, left atrial appendage ligation, left atrial ablation on 1/2/25 and was placed back on peripheral VA-ECMO due to tenuous  hemodynamics and concerns for ongoing malperfusion. He is now improved hemodynamically and able to wean off of VA-ECMO. The significant other of the patient understands the risks and benefits of the procedure and wishes for him to undergo the operation.     OPERATIVE FINDINGS:  The patient was hemodynamically stable and stable from a respiratory standpoint while weaning off of VA-ECMO. Hemostasis of the femoral vessels was noted. Triphasic signals were noted in the femoral artery distal to the repair.       OPERATIVE DESCRIPTION IN DETAIL:  The patient was already endotracheally intubated and adequately sedated. The procedure was performed at the bedside in the CVICU.    VA-ECMO flows were weaned to 1L and the patient was monitored on minimal ECMO support while the groin vessels were dissected out.     The right femoral artery and vein were exposed through a longitudinal femoral incision superior to the cannulation site. Heparin was administered.    The patient was weaned from VA-ECMO after satisfactory hemodynamics and metabolic parameters were assured.    A pursestring was placed around the femoral vein at the insertion point of the venous cannula. The femoral venous cannula was removed as the pursestring suture was tied down and secured. Hemostasis of the femoral vein was assured. Adequate vein patency distal to the repair was observed.     A pursestring was placed around the femoral artery at the insertion point of the arterial cannula. The cannula was removed as the pursestring suture was tied down and secured. Hemostasis of the femoral artery was assured.     The distal perfusion cannula was removed and manual pressure was held over the site for a minimum of 20 minutes. The distal perfusion site was assessed and noted to be soft without evidence of a hematoma.     The wound was re-inspected and satisfactory hemostasis was assured. The wound was subsequently closed in multiple layers of absorbable suture. The  skin was closed with interrupted vertical mattress 2-0 nylon suture. A sterile dressing was applied.     All sponge, needle and instrument counts were correct at the end of the case.    ANTHONY Rosenbaum MD  Cardiothoracic Surgery  Pager [phone]

## 2025-01-08 NOTE — PLAN OF CARE
Goal Outcome Evaluation:      Plan of Care Reviewed With: patient      Neuro improving, pt following commands intermittent and less restless, dilaudid off, pain controlled with oxy per tube, vitals stable, minimal vent settings, anticipate pressure support and possible ext. pacer capped, CT x4 with minimal output. TF at goal, ca out, voiding per primofit. Replacing lytes.    Outcome Evaluation: wean sedation, replace lytes,

## 2025-01-09 ENCOUNTER — APPOINTMENT (OUTPATIENT)
Dept: ULTRASOUND IMAGING | Facility: CLINIC | Age: 65
DRG: 003 | End: 2025-01-09
Payer: MEDICARE

## 2025-01-09 ENCOUNTER — APPOINTMENT (OUTPATIENT)
Dept: GENERAL RADIOLOGY | Facility: CLINIC | Age: 65
DRG: 003 | End: 2025-01-09
Attending: STUDENT IN AN ORGANIZED HEALTH CARE EDUCATION/TRAINING PROGRAM
Payer: MEDICARE

## 2025-01-09 ENCOUNTER — APPOINTMENT (OUTPATIENT)
Dept: GENERAL RADIOLOGY | Facility: CLINIC | Age: 65
DRG: 003 | End: 2025-01-09
Payer: MEDICARE

## 2025-01-09 LAB
ALBUMIN UR-MCNC: 30 MG/DL
ANION GAP SERPL CALCULATED.3IONS-SCNC: 8 MMOL/L (ref 7–15)
ANION GAP SERPL CALCULATED.3IONS-SCNC: 9 MMOL/L (ref 7–15)
ANION GAP SERPL CALCULATED.3IONS-SCNC: 9 MMOL/L (ref 7–15)
APPEARANCE UR: CLEAR
BASE EXCESS BLDV CALC-SCNC: 1.5 MMOL/L (ref -3–3)
BASE EXCESS BLDV CALC-SCNC: 2.2 MMOL/L (ref -3–3)
BASE EXCESS BLDV CALC-SCNC: 3.2 MMOL/L (ref -3–3)
BILIRUB UR QL STRIP: NEGATIVE
BUN SERPL-MCNC: 18.4 MG/DL (ref 8–23)
BUN SERPL-MCNC: 20.5 MG/DL (ref 8–23)
BUN SERPL-MCNC: 20.5 MG/DL (ref 8–23)
CALCIUM SERPL-MCNC: 7.4 MG/DL (ref 8.8–10.4)
CALCIUM SERPL-MCNC: 7.7 MG/DL (ref 8.8–10.4)
CALCIUM SERPL-MCNC: 7.8 MG/DL (ref 8.8–10.4)
CHLORIDE SERPL-SCNC: 112 MMOL/L (ref 98–107)
CHLORIDE SERPL-SCNC: 113 MMOL/L (ref 98–107)
CHLORIDE SERPL-SCNC: 114 MMOL/L (ref 98–107)
COLOR UR AUTO: YELLOW
CREAT SERPL-MCNC: 0.61 MG/DL (ref 0.67–1.17)
CREAT SERPL-MCNC: 0.69 MG/DL (ref 0.67–1.17)
CREAT SERPL-MCNC: 0.7 MG/DL (ref 0.67–1.17)
EGFRCR SERPLBLD CKD-EPI 2021: >90 ML/MIN/1.73M2
ENTEROCOCCUS FAECALIS: NOT DETECTED
ENTEROCOCCUS FAECIUM: NOT DETECTED
ERYTHROCYTE [DISTWIDTH] IN BLOOD BY AUTOMATED COUNT: 17.9 % (ref 10–15)
ERYTHROCYTE [DISTWIDTH] IN BLOOD BY AUTOMATED COUNT: 18.4 % (ref 10–15)
GLUCOSE BLDC GLUCOMTR-MCNC: 142 MG/DL (ref 70–99)
GLUCOSE BLDC GLUCOMTR-MCNC: 143 MG/DL (ref 70–99)
GLUCOSE BLDC GLUCOMTR-MCNC: 150 MG/DL (ref 70–99)
GLUCOSE BLDC GLUCOMTR-MCNC: 153 MG/DL (ref 70–99)
GLUCOSE BLDC GLUCOMTR-MCNC: 157 MG/DL (ref 70–99)
GLUCOSE BLDC GLUCOMTR-MCNC: 166 MG/DL (ref 70–99)
GLUCOSE SERPL-MCNC: 155 MG/DL (ref 70–99)
GLUCOSE SERPL-MCNC: 156 MG/DL (ref 70–99)
GLUCOSE SERPL-MCNC: 165 MG/DL (ref 70–99)
GLUCOSE UR STRIP-MCNC: NEGATIVE MG/DL
HCO3 BLDV-SCNC: 25 MMOL/L (ref 21–28)
HCO3 BLDV-SCNC: 25 MMOL/L (ref 21–28)
HCO3 BLDV-SCNC: 26 MMOL/L (ref 21–28)
HCO3 SERPL-SCNC: 23 MMOL/L (ref 22–29)
HCO3 SERPL-SCNC: 24 MMOL/L (ref 22–29)
HCO3 SERPL-SCNC: 24 MMOL/L (ref 22–29)
HCT VFR BLD AUTO: 24.8 % (ref 40–53)
HCT VFR BLD AUTO: 25.6 % (ref 40–53)
HGB BLD-MCNC: 7.7 G/DL (ref 13.3–17.7)
HGB BLD-MCNC: 7.9 G/DL (ref 13.3–17.7)
HGB UR QL STRIP: ABNORMAL
KETONES UR STRIP-MCNC: NEGATIVE MG/DL
LEUKOCYTE ESTERASE UR QL STRIP: NEGATIVE
LISTERIA SPECIES (DETECTED/NOT DETECTED): NOT DETECTED
MAGNESIUM SERPL-MCNC: 2.2 MG/DL (ref 1.7–2.3)
MCH RBC QN AUTO: 29.3 PG (ref 26.5–33)
MCH RBC QN AUTO: 29.4 PG (ref 26.5–33)
MCHC RBC AUTO-ENTMCNC: 30.9 G/DL (ref 31.5–36.5)
MCHC RBC AUTO-ENTMCNC: 31 G/DL (ref 31.5–36.5)
MCV RBC AUTO: 95 FL (ref 78–100)
MCV RBC AUTO: 95 FL (ref 78–100)
NITRATE UR QL: NEGATIVE
O2/TOTAL GAS SETTING VFR VENT: 30 %
OXYHGB MFR BLDV: 66 % (ref 70–75)
OXYHGB MFR BLDV: 72 % (ref 70–75)
OXYHGB MFR BLDV: 74 % (ref 70–75)
PCO2 BLDV: 30 MM HG (ref 40–50)
PCO2 BLDV: 32 MM HG (ref 40–50)
PCO2 BLDV: 33 MM HG (ref 40–50)
PH BLDV: 7.49 [PH] (ref 7.32–7.43)
PH BLDV: 7.51 [PH] (ref 7.32–7.43)
PH BLDV: 7.52 [PH] (ref 7.32–7.43)
PH UR STRIP: 7 [PH] (ref 5–7)
PHOSPHATE SERPL-MCNC: 1.8 MG/DL (ref 2.5–4.5)
PHOSPHATE SERPL-MCNC: 1.9 MG/DL (ref 2.5–4.5)
PLATELET # BLD AUTO: 209 10E3/UL (ref 150–450)
PLATELET # BLD AUTO: 279 10E3/UL (ref 150–450)
PO2 BLDV: 35 MM HG (ref 25–47)
PO2 BLDV: 39 MM HG (ref 25–47)
PO2 BLDV: 40 MM HG (ref 25–47)
POTASSIUM SERPL-SCNC: 3.3 MMOL/L (ref 3.4–5.3)
POTASSIUM SERPL-SCNC: 3.4 MMOL/L (ref 3.4–5.3)
POTASSIUM SERPL-SCNC: 3.4 MMOL/L (ref 3.4–5.3)
POTASSIUM SERPL-SCNC: 3.9 MMOL/L (ref 3.4–5.3)
RBC # BLD AUTO: 2.62 10E6/UL (ref 4.4–5.9)
RBC # BLD AUTO: 2.7 10E6/UL (ref 4.4–5.9)
RBC URINE: 7 /HPF
SAO2 % BLDV: 67.2 % (ref 70–75)
SAO2 % BLDV: 74.1 % (ref 70–75)
SAO2 % BLDV: 76.4 % (ref 70–75)
SODIUM SERPL-SCNC: 145 MMOL/L (ref 135–145)
SODIUM SERPL-SCNC: 145 MMOL/L (ref 135–145)
SODIUM SERPL-SCNC: 146 MMOL/L (ref 135–145)
SP GR UR STRIP: 1.02 (ref 1–1.03)
STAPHYLOCOCCUS AUREUS: NOT DETECTED
STAPHYLOCOCCUS EPIDERMIDIS: DETECTED
STAPHYLOCOCCUS LUGDUNENSIS: NOT DETECTED
STREPTOCOCCUS AGALACTIAE: NOT DETECTED
STREPTOCOCCUS ANGINOSUS GROUP: NOT DETECTED
STREPTOCOCCUS PNEUMONIAE: NOT DETECTED
STREPTOCOCCUS PYOGENES: NOT DETECTED
STREPTOCOCCUS SPECIES: NOT DETECTED
UROBILINOGEN UR STRIP-MCNC: NORMAL MG/DL
WBC # BLD AUTO: 11.3 10E3/UL (ref 4–11)
WBC # BLD AUTO: 9.5 10E3/UL (ref 4–11)
WBC URINE: 2 /HPF

## 2025-01-09 PROCEDURE — 82565 ASSAY OF CREATININE: CPT

## 2025-01-09 PROCEDURE — 250N000013 HC RX MED GY IP 250 OP 250 PS 637: Performed by: NURSE PRACTITIONER

## 2025-01-09 PROCEDURE — 81001 URINALYSIS AUTO W/SCOPE: CPT

## 2025-01-09 PROCEDURE — 85041 AUTOMATED RBC COUNT: CPT | Performed by: NURSE PRACTITIONER

## 2025-01-09 PROCEDURE — 82310 ASSAY OF CALCIUM: CPT

## 2025-01-09 PROCEDURE — 85014 HEMATOCRIT: CPT | Performed by: NURSE PRACTITIONER

## 2025-01-09 PROCEDURE — 250N000013 HC RX MED GY IP 250 OP 250 PS 637

## 2025-01-09 PROCEDURE — 80048 BASIC METABOLIC PNL TOTAL CA: CPT

## 2025-01-09 PROCEDURE — 99291 CRITICAL CARE FIRST HOUR: CPT | Mod: 24 | Performed by: ANESTHESIOLOGY

## 2025-01-09 PROCEDURE — 258N000003 HC RX IP 258 OP 636: Performed by: STUDENT IN AN ORGANIZED HEALTH CARE EDUCATION/TRAINING PROGRAM

## 2025-01-09 PROCEDURE — 84100 ASSAY OF PHOSPHORUS: CPT | Performed by: STUDENT IN AN ORGANIZED HEALTH CARE EDUCATION/TRAINING PROGRAM

## 2025-01-09 PROCEDURE — 250N000009 HC RX 250: Performed by: STUDENT IN AN ORGANIZED HEALTH CARE EDUCATION/TRAINING PROGRAM

## 2025-01-09 PROCEDURE — 94640 AIRWAY INHALATION TREATMENT: CPT

## 2025-01-09 PROCEDURE — 250N000009 HC RX 250

## 2025-01-09 PROCEDURE — 250N000011 HC RX IP 250 OP 636: Performed by: STUDENT IN AN ORGANIZED HEALTH CARE EDUCATION/TRAINING PROGRAM

## 2025-01-09 PROCEDURE — 250N000011 HC RX IP 250 OP 636

## 2025-01-09 PROCEDURE — 250N000013 HC RX MED GY IP 250 OP 250 PS 637: Performed by: STUDENT IN AN ORGANIZED HEALTH CARE EDUCATION/TRAINING PROGRAM

## 2025-01-09 PROCEDURE — 94640 AIRWAY INHALATION TREATMENT: CPT | Mod: 76

## 2025-01-09 PROCEDURE — 82805 BLOOD GASES W/O2 SATURATION: CPT

## 2025-01-09 PROCEDURE — 71045 X-RAY EXAM CHEST 1 VIEW: CPT | Mod: 77

## 2025-01-09 PROCEDURE — 84132 ASSAY OF SERUM POTASSIUM: CPT | Performed by: STUDENT IN AN ORGANIZED HEALTH CARE EDUCATION/TRAINING PROGRAM

## 2025-01-09 PROCEDURE — 93971 EXTREMITY STUDY: CPT | Mod: 26 | Performed by: RADIOLOGY

## 2025-01-09 PROCEDURE — 250N000013 HC RX MED GY IP 250 OP 250 PS 637: Performed by: PHYSICIAN ASSISTANT

## 2025-01-09 PROCEDURE — 71045 X-RAY EXAM CHEST 1 VIEW: CPT | Mod: 26 | Performed by: RADIOLOGY

## 2025-01-09 PROCEDURE — 71045 X-RAY EXAM CHEST 1 VIEW: CPT

## 2025-01-09 PROCEDURE — 87070 CULTURE OTHR SPECIMN AEROBIC: CPT | Performed by: PHYSICIAN ASSISTANT

## 2025-01-09 PROCEDURE — 999N000157 HC STATISTIC RCP TIME EA 10 MIN

## 2025-01-09 PROCEDURE — 80048 BASIC METABOLIC PNL TOTAL CA: CPT | Performed by: STUDENT IN AN ORGANIZED HEALTH CARE EDUCATION/TRAINING PROGRAM

## 2025-01-09 PROCEDURE — 82805 BLOOD GASES W/O2 SATURATION: CPT | Performed by: NURSE PRACTITIONER

## 2025-01-09 PROCEDURE — 258N000003 HC RX IP 258 OP 636

## 2025-01-09 PROCEDURE — 250N000011 HC RX IP 250 OP 636: Performed by: INTERNAL MEDICINE

## 2025-01-09 PROCEDURE — 250N000011 HC RX IP 250 OP 636: Performed by: NURSE PRACTITIONER

## 2025-01-09 PROCEDURE — 250N000013 HC RX MED GY IP 250 OP 250 PS 637: Performed by: ANESTHESIOLOGY

## 2025-01-09 PROCEDURE — 87040 BLOOD CULTURE FOR BACTERIA: CPT | Performed by: PHYSICIAN ASSISTANT

## 2025-01-09 PROCEDURE — 83735 ASSAY OF MAGNESIUM: CPT | Performed by: STUDENT IN AN ORGANIZED HEALTH CARE EDUCATION/TRAINING PROGRAM

## 2025-01-09 PROCEDURE — 94003 VENT MGMT INPAT SUBQ DAY: CPT

## 2025-01-09 PROCEDURE — 82374 ASSAY BLOOD CARBON DIOXIDE: CPT

## 2025-01-09 PROCEDURE — 93971 EXTREMITY STUDY: CPT | Mod: RT

## 2025-01-09 PROCEDURE — 200N000002 HC R&B ICU UMMC

## 2025-01-09 PROCEDURE — 999N000065 XR CHEST PORT 1 VIEW

## 2025-01-09 PROCEDURE — 36415 COLL VENOUS BLD VENIPUNCTURE: CPT | Performed by: PHYSICIAN ASSISTANT

## 2025-01-09 PROCEDURE — 250N000011 HC RX IP 250 OP 636: Performed by: PHYSICIAN ASSISTANT

## 2025-01-09 RX ORDER — CALCIUM CARBONATE 500 MG/1
1000 TABLET, CHEWABLE ORAL
Status: DISCONTINUED | OUTPATIENT
Start: 2025-01-09 | End: 2025-01-25 | Stop reason: HOSPADM

## 2025-01-09 RX ORDER — POTASSIUM CHLORIDE 29.8 MG/ML
20 INJECTION INTRAVENOUS
Status: COMPLETED | OUTPATIENT
Start: 2025-01-09 | End: 2025-01-09

## 2025-01-09 RX ORDER — PROPOFOL 10 MG/ML
5-75 INJECTION, EMULSION INTRAVENOUS CONTINUOUS
Status: DISCONTINUED | OUTPATIENT
Start: 2025-01-09 | End: 2025-01-11

## 2025-01-09 RX ORDER — PIPERACILLIN SODIUM, TAZOBACTAM SODIUM 4; .5 G/20ML; G/20ML
4.5 INJECTION, POWDER, LYOPHILIZED, FOR SOLUTION INTRAVENOUS EVERY 6 HOURS
Status: DISCONTINUED | OUTPATIENT
Start: 2025-01-09 | End: 2025-01-11

## 2025-01-09 RX ORDER — VANCOMYCIN HYDROCHLORIDE
1250 EVERY 12 HOURS
Status: DISCONTINUED | OUTPATIENT
Start: 2025-01-10 | End: 2025-01-11

## 2025-01-09 RX ADMIN — HEPARIN SODIUM 5000 UNITS: 5000 INJECTION, SOLUTION INTRAVENOUS; SUBCUTANEOUS at 23:06

## 2025-01-09 RX ADMIN — MIDAZOLAM 2 MG: 1 INJECTION INTRAMUSCULAR; INTRAVENOUS at 15:13

## 2025-01-09 RX ADMIN — PIPERACILLIN AND TAZOBACTAM 4.5 G: 4; .5 INJECTION, POWDER, FOR SOLUTION INTRAVENOUS at 22:59

## 2025-01-09 RX ADMIN — POTASSIUM CHLORIDE 20 MEQ: 29.8 INJECTION, SOLUTION INTRAVENOUS at 05:28

## 2025-01-09 RX ADMIN — INSULIN ASPART 1 UNITS: 100 INJECTION, SOLUTION INTRAVENOUS; SUBCUTANEOUS at 20:13

## 2025-01-09 RX ADMIN — PROPOFOL 10 MCG/KG/MIN: 10 INJECTION, EMULSION INTRAVENOUS at 16:32

## 2025-01-09 RX ADMIN — POTASSIUM PHOSPHATE, MONOBASIC POTASSIUM PHOSPHATE, DIBASIC 30 MMOL: 224; 236 INJECTION, SOLUTION, CONCENTRATE INTRAVENOUS at 18:25

## 2025-01-09 RX ADMIN — BACLOFEN 10 MG: 10 TABLET ORAL at 07:44

## 2025-01-09 RX ADMIN — ROSUVASTATIN CALCIUM 40 MG: 20 TABLET, FILM COATED ORAL at 07:44

## 2025-01-09 RX ADMIN — INSULIN ASPART 1 UNITS: 100 INJECTION, SOLUTION INTRAVENOUS; SUBCUTANEOUS at 23:22

## 2025-01-09 RX ADMIN — PANTOPRAZOLE SODIUM 40 MG: 40 INJECTION, POWDER, FOR SOLUTION INTRAVENOUS at 07:45

## 2025-01-09 RX ADMIN — Medication 15 ML: at 07:45

## 2025-01-09 RX ADMIN — IPRATROPIUM BROMIDE AND ALBUTEROL SULFATE 3 ML: .5; 3 SOLUTION RESPIRATORY (INHALATION) at 08:30

## 2025-01-09 RX ADMIN — BACLOFEN 10 MG: 10 TABLET ORAL at 23:06

## 2025-01-09 RX ADMIN — HYDRALAZINE HYDROCHLORIDE 10 MG: 20 INJECTION INTRAMUSCULAR; INTRAVENOUS at 03:14

## 2025-01-09 RX ADMIN — IPRATROPIUM BROMIDE AND ALBUTEROL SULFATE 3 ML: .5; 3 SOLUTION RESPIRATORY (INHALATION) at 21:07

## 2025-01-09 RX ADMIN — CALCIUM CARBONATE (ANTACID) CHEW TAB 500 MG 1000 MG: 500 CHEW TAB at 10:47

## 2025-01-09 RX ADMIN — OLANZAPINE 5 MG: 5 TABLET, ORALLY DISINTEGRATING ORAL at 12:42

## 2025-01-09 RX ADMIN — Medication 100 MCG: at 15:14

## 2025-01-09 RX ADMIN — BACLOFEN 10 MG: 10 TABLET ORAL at 16:31

## 2025-01-09 RX ADMIN — ASPIRIN 81 MG CHEWABLE TABLET 162 MG: 81 TABLET CHEWABLE at 07:44

## 2025-01-09 RX ADMIN — POTASSIUM PHOSPHATE, MONOBASIC POTASSIUM PHOSPHATE, DIBASIC 9 MMOL: 236; 224 INJECTION, SOLUTION INTRAVENOUS at 11:58

## 2025-01-09 RX ADMIN — CALCIUM CARBONATE (ANTACID) CHEW TAB 500 MG 1000 MG: 500 CHEW TAB at 16:31

## 2025-01-09 RX ADMIN — Medication 25 MCG/HR: at 14:30

## 2025-01-09 RX ADMIN — HEPARIN SODIUM 5000 UNITS: 5000 INJECTION, SOLUTION INTRAVENOUS; SUBCUTANEOUS at 07:45

## 2025-01-09 RX ADMIN — POTASSIUM CHLORIDE 20 MEQ: 29.8 INJECTION, SOLUTION INTRAVENOUS at 12:09

## 2025-01-09 RX ADMIN — PIPERACILLIN AND TAZOBACTAM 4.5 G: 4; .5 INJECTION, POWDER, FOR SOLUTION INTRAVENOUS at 10:47

## 2025-01-09 RX ADMIN — ACETYLCYSTEINE 2 ML: 200 SOLUTION ORAL; RESPIRATORY (INHALATION) at 16:05

## 2025-01-09 RX ADMIN — VANCOMYCIN HYDROCHLORIDE 2250 MG: 500 INJECTION, POWDER, LYOPHILIZED, FOR SOLUTION INTRAVENOUS at 19:53

## 2025-01-09 RX ADMIN — INSULIN GLARGINE 20 UNITS: 100 INJECTION, SOLUTION SUBCUTANEOUS at 08:02

## 2025-01-09 RX ADMIN — Medication 10 MG: at 19:48

## 2025-01-09 RX ADMIN — INSULIN ASPART 2 UNITS: 100 INJECTION, SOLUTION INTRAVENOUS; SUBCUTANEOUS at 11:07

## 2025-01-09 RX ADMIN — DEXMEDETOMIDINE HYDROCHLORIDE 0.4 MCG/KG/HR: 4 INJECTION, SOLUTION INTRAVENOUS at 11:13

## 2025-01-09 RX ADMIN — ACETYLCYSTEINE 2 ML: 200 SOLUTION ORAL; RESPIRATORY (INHALATION) at 08:29

## 2025-01-09 RX ADMIN — POTASSIUM CHLORIDE 20 MEQ: 29.8 INJECTION, SOLUTION INTRAVENOUS at 07:45

## 2025-01-09 RX ADMIN — ACETYLCYSTEINE 2 ML: 200 SOLUTION ORAL; RESPIRATORY (INHALATION) at 21:07

## 2025-01-09 RX ADMIN — POTASSIUM CHLORIDE 20 MEQ: 29.8 INJECTION INTRAVENOUS at 01:53

## 2025-01-09 RX ADMIN — Medication 60 ML: at 07:45

## 2025-01-09 RX ADMIN — INSULIN ASPART 1 UNITS: 100 INJECTION, SOLUTION INTRAVENOUS; SUBCUTANEOUS at 05:02

## 2025-01-09 RX ADMIN — SERTRALINE HYDROCHLORIDE 200 MG: 100 TABLET ORAL at 07:44

## 2025-01-09 RX ADMIN — POTASSIUM CHLORIDE 20 MEQ: 29.8 INJECTION INTRAVENOUS at 00:29

## 2025-01-09 RX ADMIN — HYDROXYZINE HYDROCHLORIDE 50 MG: 50 TABLET ORAL at 05:27

## 2025-01-09 RX ADMIN — OXYCODONE HYDROCHLORIDE 10 MG: 10 TABLET ORAL at 10:47

## 2025-01-09 RX ADMIN — IPRATROPIUM BROMIDE AND ALBUTEROL SULFATE 3 ML: .5; 3 SOLUTION RESPIRATORY (INHALATION) at 12:33

## 2025-01-09 RX ADMIN — PIPERACILLIN AND TAZOBACTAM 4.5 G: 4; .5 INJECTION, POWDER, FOR SOLUTION INTRAVENOUS at 17:22

## 2025-01-09 RX ADMIN — IPRATROPIUM BROMIDE AND ALBUTEROL SULFATE 3 ML: .5; 3 SOLUTION RESPIRATORY (INHALATION) at 16:04

## 2025-01-09 RX ADMIN — CHLORHEXIDINE GLUCONATE 15 ML: 1.2 SOLUTION ORAL at 19:47

## 2025-01-09 RX ADMIN — HYDRALAZINE HYDROCHLORIDE 10 MG: 20 INJECTION INTRAMUSCULAR; INTRAVENOUS at 12:06

## 2025-01-09 RX ADMIN — Medication 6.25 MG: at 19:52

## 2025-01-09 RX ADMIN — POTASSIUM PHOSPHATE, MONOBASIC POTASSIUM PHOSPHATE, DIBASIC 9 MMOL: 236; 224 INJECTION, SOLUTION INTRAVENOUS at 09:15

## 2025-01-09 RX ADMIN — HEPARIN SODIUM 5000 UNITS: 5000 INJECTION, SOLUTION INTRAVENOUS; SUBCUTANEOUS at 16:31

## 2025-01-09 RX ADMIN — HYDROXYZINE HYDROCHLORIDE 50 MG: 50 TABLET ORAL at 12:42

## 2025-01-09 RX ADMIN — ACETYLCYSTEINE 2 ML: 200 SOLUTION ORAL; RESPIRATORY (INHALATION) at 12:33

## 2025-01-09 RX ADMIN — CHLORHEXIDINE GLUCONATE 15 ML: 1.2 SOLUTION ORAL at 07:45

## 2025-01-09 RX ADMIN — ACETAMINOPHEN 650 MG: 325 TABLET, FILM COATED ORAL at 10:47

## 2025-01-09 RX ADMIN — INSULIN ASPART 2 UNITS: 100 INJECTION, SOLUTION INTRAVENOUS; SUBCUTANEOUS at 08:02

## 2025-01-09 RX ADMIN — POTASSIUM CHLORIDE 20 MEQ: 29.8 INJECTION, SOLUTION INTRAVENOUS at 13:11

## 2025-01-09 RX ADMIN — INSULIN ASPART 1 UNITS: 100 INJECTION, SOLUTION INTRAVENOUS; SUBCUTANEOUS at 17:24

## 2025-01-09 ASSESSMENT — ACTIVITIES OF DAILY LIVING (ADL)
ADLS_ACUITY_SCORE: 53
ADLS_ACUITY_SCORE: 53
ADLS_ACUITY_SCORE: 57
ADLS_ACUITY_SCORE: 53
ADLS_ACUITY_SCORE: 57
ADLS_ACUITY_SCORE: 53
ADLS_ACUITY_SCORE: 53
ADLS_ACUITY_SCORE: 57
ADLS_ACUITY_SCORE: 53
ADLS_ACUITY_SCORE: 57
ADLS_ACUITY_SCORE: 53
DEPENDENT_IADLS:: INDEPENDENT
ADLS_ACUITY_SCORE: 53
ADLS_ACUITY_SCORE: 57

## 2025-01-09 NOTE — PROGRESS NOTES
CVTS    Brief chest tube note      Pacing wires removed. After several minutes of observation, there was no change in quality/quantify of CT output.  Mediastinal and left pleural chest tubes removed without immediate complication. Occlusive dressing placed. Will order follow up CXR. Right pleural chest tube left in place as it has an air leak.         Maria Luz Lozada,   General Surgery Resident

## 2025-01-09 NOTE — PROGRESS NOTES
[facility]    ICU Progress Note       Date of Admission:  12/31/2024    Assessment: Critical Care   Erwin Aguilar is a 64 year old male with a past medical history of DM2, HTN, PVD, and neuropathic pain who initially presented on 12/31/24 to the ED with arm pain, vomiting, and diarrhea; ACS and ST depression. He subsequently had VT arrest with 1 round of CPR, epinephrine, defibrillation. After ROSC had afib with RVR. Became hypotensive and was subsequently cannulated for VA ECMO on 1/2/25, s/p CABG x4, and arrived to the ICU at that time. He was de cannulated on 1/3/25 at bedside. ICU course complicated by large R ptx seen on chest CT on 1/4/25, CVTS Fellow placed chest tube at bedside with improvement of pneumothorax.       MAJOR CHANGES/UPDATES  - ok to restart precedex with PST  - PST today, wean to extubate  - VBG during PST  - follow Blood cultures and sputum cultures  - Na coming down appropriately, 6 mEq in 24 hr - decrease FWF to 100 mL/hr  - surgery to remove A/V wires   - continue CT - timing for removal per surgery  - RUE US to r/o DVT  - restart zosyn, will narrow based on cultures    Plan: Critical Care     PLAN:  Neuro/ pain/ sedation:  # Acute postoperative pain  # Chronic pain with opioid dependence  # Neuropathy, PTA  # Sedation while on mechanical ventilation   # Agitation  # Delrium   Analgesia   - Gtt Dilaudid off   - Scheduled: baclofen    - PRN: oxycodone  Sedation    - Gtt: Precedex gtt wean as able   - Scheduled: Melaontin 10 mg at bedtime    - PRN: hydroxyzine, olanzapine  RASS goal 0 to -1     # Generalized Anxiety, PTA   - Continue PTA Sertraline  - Restart PTA PRN hydroxyzine      # Encephalopathy of unclear etiology   - Prior to procedure  - 1/1 vEEG without seizure activity (severe encephalopathy) and 12/31, 1/4, and 1/7 CTH negative for acute findings.     Pulmonary care:   # Acute Hypoxic Respiratory failure   # Respiratory alkalosis 2:2  agitation - improved   # R tension pneumothorax   # Aspiration Pneumonia, Klebsiella  FiO2 (%): 30 %, Resp: 20, Vent Mode: CMV/AC, Resp Rate (Set): 14 breaths/min, Tidal Volume (Set, mL): 420 mL, PEEP (cm H2O): 5 cmH2O, Pressure Support (cm H2O): 7 cmH2O, Resp Rate (Set): 14 breaths/min, Tidal Volume (Set, mL): 420 mL, PEEP (cm H2O): 5 cmH2O   - ON of 1/4 CT C/A/P ordered for persistently elevated lactate which revealed large R tension pneumothorax. CVTS notified and fellow placed chest tube at bedside this morning with improvement of pneumothorax.   - PST today    - PRN nebs for secretion management   - VAP bundle while intubated  - Goal SpO2 > 92%  - CXR daily    - CXR this morning, per my review, stable atelectasis, increased subcutaneous emphysema  - Pleural CT output 120  - intermittently febrile 1/8 and 1/9, blood cultures sent    Cardiovascular:    # Mixed Shock, cardiogenic, vasoplegic  # HTN, PTA   # VT Cardiac Arrest on VA ECMO (01/01/24 - 1/3)  # CAD s/p CABG x4  # Dyslipidemia, PTA   # Cardiomyopathy   1/5 TTE Normal LVEF 55-60%, normal RV function   - Monitor hemodynamics closely. Goal MAP >65, SBP <140   - NE off this morning, if continues to be off for 24 hrs, discontinue   - Aspirin 81mg daily   - Crestor 40mg daily  - A/V wires capped - removal per surgery   - Hold PTA apixaban, Dyazide, propanolol     GI care / Nutrition:   # At risk for protein calorie malnutrition   # Constipation   - Diet: TF @ goal via NJ   - PPI  - Last BM: 1/8. Bowel reg PRN      Renal / Fluids / Electrolytes:   # Acute Kidney Injury 2/2 ATN - improving   # Hypokalemia   # Hypernatremia  # Hypervolemia  # urinary retention  BL creat appears to be ~ 0.6-0.8  - Scheduled 40 mEQ of KCl q 12 hr x2 doses with diuresis   - Na+ is 146, free water deficit calculated to be 2.3 L   - Strict I/O, daily weights, avoid/limit nephrotoxins  - Pt's K+ this morning 3.4, nursing high replacement protocol   - Na+ downtrending, 146 this AM,  decrease FWF to 100 mL   - Replete lytes PRN per protocol   - urinary retention yesterday, ca replaced  - urinalysis      Endocrine:    # Stress hyperglycemia  # Type 2 Diabetes Mellitus, PTA   - Preop A1c 5.7  - Transition from insulin gtt to sliding scale insulin   - Goal BG <180 for optimal healing  - Lantus 20 units daily (PTA 26 units daily)    ID / Antibiotics:  # Stress induced leukocytosis  # Aspiration Pneumonia, Klebsiella  # febrile   - Broadened abx from CTX (12/31 to 1/4) to zosyn 1/4-1/6 narrowed to CTX 1/7  - CTX discontinued on 1/8, restarted zosyn 1/9/25   - Monitor fever curve, WBC, and inflammatory markers as appropriate      Cultures  1/4 UA non-infectious appearing negative LE, nitrites, WBCs, and bacteria   1/4 Sputum cx gram stain 1+ Klebsiella pneumoniae   1/3 blood cx NGTD @ 2 day   12/31 sputum: Klebsiella pneumonia with resistance to ampicillin   12/31 MRSA and blood cultures negative     Heme:     # Acute blood loss anemia  # Acute thrombocytopenia  No s/sx active bleeding  - Daily CBC   - Hgb 7.7 today, stable   - Hgb goal > 7.0, transfuse PRN   - DVT ppx: SQH      MSK / Skin:  # Sternotomy   # Surgical Incision  - Sternal precautions, postop incision management protocol  - PT/OT/CR  - Right shoulder stiff, RUE edema - RUE US sent    #PVD, PTA   - S/p right BKA, 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     Total Critical Care time spent by me, excluding procedures, was 45 minutes.        Vidal Leon MD  [facility]  Securely message with [employer] (more info)  Text page via [facility] Paging/Directory     Clinically Significant Risk Factors        # Hypokalemia:  "Lowest K = 2.9 mmol/L in last 2 days, will replace as needed  # Hypernatremia: Highest Na = 152 mmol/L in last 2 days, will monitor as appropriate  # Hyperchloremia: Highest Cl = 114 mmol/L in last 2 days, will monitor as appropriate      # Hypocalcemia: Lowest iCa = 4.3 mg/dL in last 2 days, will monitor and replace as appropriate     # Hypoalbuminemia: Lowest albumin = 2 g/dL at 1/2/2025  5:22 PM, will monitor as appropriate       # Hypertension: Noted on problem list     # Acute Hypoxic Respiratory Failure: Documented O2 saturation < 90%. Continue supplemental oxygen as needed  # Acute Hypercapnic Respiratory Failure: based on arterial blood gas results.  Continue supplemental oxygen and ventilatory support as indicated.  # Acute Hypercapnic Respiratory Failure: based on venous blood gas results.  Continue supplemental oxygen and ventilatory support as indicated.         # Overweight: Estimated body mass index is 27.49 kg/m  as calculated from the following:    Height as of this encounter: 1.803 m (5' 11\").    Weight as of this encounter: 89.4 kg (197 lb 1.5 oz).   # Severe Malnutrition: based on nutrition assessment    # Asthma: noted on problem list    # History of CABG: noted on surgical history       ____________________________________________________________________    Interval History   Patient is intubated but alert, able to follow directions. Denies any pain. RUE swollen, denies any pain or previous injury to the shoulder    Intake/Output  I/O last 3 completed shifts:  In: 5159.3 [I.V.:749.3; NG/GT:3090]  Out: 2847 [Urine:2325; Stool:300; Chest Tube:222]      Physical Exam   Vital Signs: Temp: 99.1  F (37.3  C) Temp src: Axillary BP: (!) 141/65 Pulse: 97   Resp: 20 SpO2: 100 % O2 Device: Mechanical Ventilator    Weight: 197 lbs 1.46 oz    Neuro: Intubated. Awake and alert, eyes tracking and able to follow commangs  HEENT: Normocephalic, atraumatic. PERRL, and nonicteric.   CV: RRR on monitor, S1S2, all " extremities well perfused   Respiratory: increased respiratory rate on CMV RR 14, , PEEP 8 , FiO2 30 %. Equal chest rise b/l. Mediastinal chest tubes with 186 ml of serosanguinous output/24 hours without airleak noted. R pleural with 120 ml of serosanguinous output small airleak noted.    GI: Abdomen soft. Non-distended.   : Voiding appropriately  MSK: Right BKA. Moves all extremities well with decreased upper limb strength. Sensation intact in all upper/lower extremities. Right UE swelling and subcutaneous emphysema, weeping from skin.   Skin: Sternal incision open to air, no drainage, surrounding erythema or induration noted. Large hematoma on L groin near previous ECMO cannulation site.     Data   I reviewed all medications, new labs and imaging results over the last 24 hours.  Arterial Blood Gases   Recent Labs   Lab 01/06/25  0323 01/06/25  0014 01/05/25  1953 01/05/25  1423   PH 7.47* 7.49* 7.51* 7.54*   PCO2 38 37 35 32*   PO2 141* 114* 144* 109*   HCO3 28 28 28 27       Complete Blood Count   Recent Labs   Lab 01/09/25  0408 01/08/25  1545 01/08/25  0354 01/07/25  1535   WBC 9.5 8.0 7.9 8.8   HGB 7.7* 7.9* 7.6* 7.7*    178 151 146*       Basic Metabolic Panel  Recent Labs   Lab 01/09/25  0422 01/09/25  0408 01/08/25  2351 01/08/25  2136 01/08/25  1546 01/08/25  1126 01/08/25  1104 01/08/25  1007 01/08/25  0403 01/08/25  0354   NA  --  146*  --  147*  --  148*  --   --   --  150*   POTASSIUM  --  3.4  --  2.9*  --  3.5  --  3.5  --  3.4   CHLORIDE  --  114*  --  112*  --  113*  --   --   --  114*   CO2  --  24  --  26  --  28  --   --   --  27   BUN  --  20.5  --  21.3  --  26.0*  --   --   --  27.0*   CR  --  0.69  --  0.71  --  0.75  --   --   --  0.79   * 155* 151* 165*   < > 172*   < >  --    < > 189*    < > = values in this interval not displayed.       Liver Function Tests  Recent Labs   Lab 01/04/25  0216 01/03/25  0336 01/02/25  2155 01/02/25  2024 01/02/25  1724 01/02/25  1722    * 165*  --  175*  --  112*   * 65  --  62  --  33   ALKPHOS 66 54  --  59  --  52   BILITOTAL 0.7 0.5  --  0.9  --  0.8   ALBUMIN 2.4* 2.4*  --  2.3*  --  2.0*   INR  --  1.29* 1.42* 1.43* 1.59*  --        Pancreatic Enzymes  No lab results found in last 7 days.      Coagulation Profile  Recent Labs   Lab 01/03/25  1124 01/03/25  0336 01/02/25  2155 01/02/25 2024 01/02/25  1724   INR  --  1.29* 1.42* 1.43* 1.59*   PTT 34 35 39* 47* 71*       IMAGING:  Recent Results (from the past 24 hours)   XR Chest Port 1 View    Narrative    Exam: XR CHEST PORT 1 VIEW, 1/8/2025 2:51 PM    Indication: CT on WS, review R sided PTX    Comparison: Same-day chest radiograph 1/8/2025 13:13, additional  priors    Findings:   An AP radiograph of the chest was obtained. The left costophrenic  angle is collimated out of view. Endotracheal tube tip terminates  approximately 5.7 cm above the edy, in appropriate position.  Enteric tube courses past the diaphragm out of view. Right IJ central  venous catheter tip terminates in the superior SVC. Stable position of  right apical chest tube. Partial visualization of the left basilar  chest tube with the tip not visualized. Postoperative changes of the  chest including midline sternotomy and intact cerclage wires and clips  projecting over mediastinum. Stable position of left atrial appendage  clip. Stable right subcutaneous emphysema along the chest wall.    The cardiomediastinal silhouette is unchanged. No visualized pleural  effusion. No discernible pneumothorax. Stable perihilar and right  basilar opacities favored to represent atelectasis and/or edema. No  new focal consolidation. No acute osseous abnormality.      Impression    Impression:   1. No significant pneumothorax.  2. Stable position of support devices. No significant interval changes  within the chest since same day radiograph.    I have personally reviewed the examination and initial interpretation  and I  agree with the findings.    AYLEEN WATSON MD         SYSTEM ID:  [id number]   XR Chest Port 1 View    Impression    RESIDENT PRELIMINARY INTERPRETATION  Impression:  1. No significant pneumothorax.  2. Increased subcutaneous emphysema along the right chest wall and  bilateral neck.  3. Stable positioning of support devices.  4. Similar streaky perihilar and bibasilar opacities, favor  atelectasis and/or pulmonary edema.

## 2025-01-09 NOTE — PLAN OF CARE
Goal Outcome Evaluation:      Plan of Care Reviewed With: patient      Updated: Zuleyka RN 0501    Neuro:  PERMICKLA. Following commands. TMAX 38.8. Pan cultured.    CV: SR 70s. MAP goal > 65, SBP < 140. x4 chest tubes. Right pleural to water seal per order.    Pulm: VC AC 30%/14/420/5. Bed in Chair position. Anticipate Pressure support today.    GI: NG @ 63. TF goal@ 55 mL/hr. 150 q1 hour FWF. Rectal tube with watery output.     : Rodriguez for acute retention.    Outcome Evaluation: sedation weaned

## 2025-01-09 NOTE — PLAN OF CARE
Problem: Adult Inpatient Plan of Care  Goal: Plan of Care Review  Description: The Plan of Care Review/Shift note should be completed every shift.  The Outcome Evaluation is a brief statement about your assessment that the patient is improving, declining, or no change.  This information will be displayed automatically on your shift  note.  Flowsheets (Taken 1/9/2025 5012)  Outcome Evaluation: Psychosocial Assessment completed w/ SO, Isabella, at bedside  Plan of Care Reviewed With: significant other  Overall Patient Progress: improving   TINO Zuñiga, [facility]ton Regional HospitalSW   4A/4E ICU   Phone: [phone]  Available via [employer]

## 2025-01-09 NOTE — PROGRESS NOTES
Per RCAT protocol discontinue CPT not indicated at this time according to CXR and suctioning small amount of secretions BS clear bilateral. Will continue nebs and monitor secretions.

## 2025-01-09 NOTE — CONSULTS
Care Management Initial Consult    General Information  Assessment completed with: Spouse or significant otherIsabella  Type of CM/SW Visit: Initial Assessment    Primary Care Provider verified and updated as needed: Yes   Readmission within the last 30 days: no previous admission in last 30 days      Reason for Consult: discharge planning, other (see comments) (elevated risk score)  Advance Care Planning: Advance Care Planning Reviewed:  (No HCD)          Communication Assessment  Patient's communication style: spoken language (English or Bilingual)    Hearing Difficulty or Deaf: no   Wear Glasses or Blind: no    Cognitive  Cognitive/Neuro/Behavioral: .WDL except  Level of Consciousness: alert  Arousal Level: opens eyes spontaneously  Orientation: other (see comments) (RIKI)  Mood/Behavior: cooperative, calm  Best Language:  (RIKI)  Speech: endotracheal tube    Living Environment:   People in home: significant other  Isabella  Current living Arrangements: apartment      Able to return to prior arrangements: yes       Family/Social Support:  Care provided by: self  Provides care for: significant other  Marital Status: Lives with Significant Other  Support system: Significant Other, Other (specify) (niece)       Isabella  Description of Support System: Supportive, Involved    Support Assessment: Caregiver difficulty providing physical care/safety    Current Resources:   Patient receiving home care services: No        Community Resources: None  Equipment currently used at home: wheelchair, manual, cane, quad  Supplies currently used at home: None    Employment/Financial:  Employment Status: retired        Financial Concerns: none   Referral to Financial Worker: No       Does the patient's insurance plan have a 3 day qualifying hospital stay waiver?  Yes     Which insurance plan 3 day waiver is available? [employer] REACH    Will the waiver be used for post-acute placement? Undetermined at this time    Lifestyle & Psychosocial  Needs:  Social Drivers of Health     Food Insecurity: Unknown (1/2/2025)    Food Insecurity     Within the past 12 months, did you worry that your food would run out before you got money to buy more?: Patient unable to answer     Within the past 12 months, did the food you bought just not last and you didn t have money to get more?: Patient unable to answer   Depression: Not at risk (5/6/2024)    PHQ-2     PHQ-2 Score: 0   Housing Stability: Unknown (1/2/2025)    Housing Stability     Do you have housing? : Patient unable to answer     Are you worried about losing your housing?: Patient unable to answer   Tobacco Use: Medium Risk (8/5/2024)    Patient History     Smoking Tobacco Use: Former     Smokeless Tobacco Use: Never     Passive Exposure: Past   Financial Resource Strain: Unknown (1/2/2025)    Financial Resource Strain     Within the past 12 months, have you or your family members you live with been unable to get utilities (heat, electricity) when it was really needed?: Patient unable to answer   Alcohol Use: Not on file   Transportation Needs: Unknown (1/2/2025)    Transportation Needs     Within the past 12 months, has lack of transportation kept you from medical appointments, getting your medicines, non-medical meetings or appointments, work, or from getting things that you need?: Patient unable to answer   Physical Activity: Inactive (12/31/2020)    Exercise Vital Sign     Days of Exercise per Week: 0 days     Minutes of Exercise per Session: 0 min   Interpersonal Safety: Unknown (1/2/2025)    Interpersonal Safety     Do you feel physically and emotionally safe where you currently live?: Patient unable to answer     Within the past 12 months, have you been hit, slapped, kicked or otherwise physically hurt by someone?: Patient unable to answer     Within the past 12 months, have you been humiliated or emotionally abused in other ways by your partner or ex-partner?: Patient unable to answer   Stress: Not on  file   Social Connections: Unknown (12/31/2020)    Social Connection and Isolation Panel [NHANES]     Frequency of Communication with Friends and Family: Not on file     Frequency of Social Gatherings with Friends and Family: Not on file     Attends Orthodox Services: Not on file     Active Member of Clubs or Organizations: Not on file     Attends Club or Organization Meetings: Not on file     Marital Status:    Health Literacy: Not on file       Functional Status:  Prior to admission patient needed assistance:   Dependent ADLs:: Independent, Wheelchair-independent  Dependent IADLs:: Independent  Assesssment of Functional Status: Not at baseline with ADL Functioning, Not at baseline with mobility, Not at  functional baseline    Mental Health Status:  Mental Health Status: No Current Concerns       Chemical Dependency Status:  Chemical Dependency Status: No Current Concerns             Values/Beliefs:  Spiritual, Cultural Beliefs, Orthodox Practices, Values that affect care: no               Discussed  Partnership in Safe Discharge Planning  document with patient/family: No    Additional Information:  Care Management consult for elevated risk score. Pt admitted 12/31/2024 for VT arrest. Pt s/p CABGx4 and VA ECMO on 1/2. Pt was decannulated 1/3. Pt remains intubated with anticipated extubation today.     Pt's SO, Isabella, has been hard to reach and unable to leave vmGiuliano Mason at bedside today and writer able to complete psychosocial assessment w/ her today. Pt lives with SO, in an apartment, with elevator access. Pt is s/p R BKA 5/9/2024. SO reports pt was using a prosthetic and wheel chair, that he bought off of [employer], in the apartment. Pt was able to ambulate short distances. Pt was independent w/ ADLs, IADLs and ambulation. No recent falls at home. Pt did not have any home supports. Pt does have access to a canes and a walker at home. Pt nor SO drive. Pt uses Uber or taxis to get around. SO reports pt  "will not sign up for metro mobility.     Discussed discharge planning and anticipated need for inpatient rehab. Anticipate it might be challenging to get pt to agree to a TCU. Pt's admission for BKA was 5/924-5/10/24. Pt did not leave AMA, but appears pt insisted on discharge home POD#1. It does not appear pt participated in therapy during that admission. Pt was discharged home with no home services. Per SO, pt did struggle managing self cares once home and describes those weeks after discharge as \"horrible\". SO is unable to provide any assistance to pt as she is disabled herself. SO is hoping pt goes to inpatient rehab and acknowledges this will be challenging.     In regards to decision making, Isabella appears to be the appropriate decision maker. Pt and SO have been in a relationship for 27yrs. Pt does not have any children. Encouraged Isabella to check her voicemail as writer was unable to leave a message today. Writer asked if there was an alternative contact number or person we could put down and there is not.     Next Steps: Social Work to continue to follow for discharge planning.     TINO Zuñiga, LICSW   4A/4E ICU   Phone: [phone]  Available via [employer]       "

## 2025-01-09 NOTE — DISCHARGE INSTRUCTIONS
AFTER YOU GO HOME FROM YOUR HEART SURGERY  (CABG x3, LAAC, MAZE by Dr. Rosenbaum on 1/2/25)    You had a sternotomy, avoid lifting anything greater than ten pounds for 8 weeks after surgery and then less than 20 pounds for an additional 4 weeks.   Do not reach backwards or use arms to push out of chair.   Do not let people pull on your arms to assist with standing.   Avoid twisting or reaching too far across your body.    Avoid strenuous activities such as bowling, vacuuming, raking, shoveling, golf or tennis for 12 weeks after your surgery.   It is okay to resume sex if you feel comfortable in doing so. You may have to try different positions with your partner.    Splint your chest incision by hugging a pillow or bringing your arms across your chest when coughing or sneezing.     No driving for 4 weeks after surgery or while on pain medication.    Shower or wash your incisions daily with soap and water (or as instructed), pat dry.   Keep wound clean and dry, showers are okay after discharge, but don't let spray hit directly on incision.   No baths or swimming for 1 month.   Cover chest tube sites to be left open to air.   Right Groin: wet to dry nugauze strip light packing BID.   Watch for signs of infection: increased redness, tenderness, warmth or any drainage that appears infected (pus like) or is persistent.  Also a temperature > 100.5 F or chills. Call your surgeon or primary care provider's office immediately.   Remove any skin glue left on incisions after 10-14 days. This will not affect your incision and can speed up healing.    Exercise is very important in your recovery. Please follow the guidelines set up for you in your cardiac rehab classes at the hospital. If outpatient cardiac rehab was ordered for you, we highly recommend you participate. If you have problems arranging your cardiac rehab, please call [phone] for all locations, with the exception of [place], please call [phone] and [place], please call [phone].    Avoid sitting for prolonged periods of time, try to walk every hour during the day. If you have a leg incision, elevate your leg often when you are not walking.    Check your weight when you get home from the hospital and continue to check it daily through your recovery for at least a month. If you notice a weight gain of 2-3 pounds in a week, notify your primary care physician, cardiologist or surgeon.    Bowel activity may be slow after surgery. If necessary, you may take an over the counter laxative such as Milk of Magnesia or Miralax. You may have stool softeners prescribed (docusate sodium, Senokot). We recommend using stool softeners while using narcotics for pain (oxycodone/percocet, hydrocodone/vicodin, hydromorphone/dilaudid).      Wean OFF of narcotics (oxycodone, dilaudid, hydrocodone) as soon as possible. You should continue taking acetaminophen as long as you have any surgical pain as the first choice for pain control and add narcotics as necessary for pain to be tolerable.      DO NOT SMOKE.  IF YOU NEED HELP QUITTING, PLEASE TALK WITH YOUR CARDIOLOGIST OR PRIMARY DOCTOR.    REGARDING PRESCRIPTION REFILLS.  If you need a refill on your pain medication contact us to discuss your pain and a possible one time refill.   All other medications will be adjusted, discontinued and re-filled by your primary care physician and/or your cardiologist as they were prior to your surgery. We have given you enough for one to three month with possibly one refill.    POST-OPERATIVE CLINIC VISITS  You will have a follow up visit in [facility] at the [facility].  You will then return to the care of your primary provider and your cardiologist. Future medication refills should come from your PCP or Cardiologist.   You should see your primary care provider about 4 weeks after discharge.   It is important to see your  cardiologist about 6 weeks after discharge.    If you do not hear from a  in 7 days, please call [phone] (choose option 1) and request to be seen with a general cardiologist or someone that you have seen in the past.   If there is a need to return to see CT Surgery please call our  at [phone].    SURGICAL QUESTIONS  Please call Registered Nurse Care Coordinator with surgical recovery and medication questions, their phone numbers are listed below.  They will assist you with your needs and contact other surgery care team members as indicated.    On weekends or after hours, please call [phone] and ask the  to page the Cardiothoracic Surgery fellow on call.      Thank you,    Your Cardiothoracic Surgery Team   Fiorella Salazar, RN Care Coordinator - [phone]  Lorrie Rodriguez RN Care Coordinator - [phone]  Nishi Morales, RN Care Coordinator - [phone]  Brandy Contreras, RN Care Coordinator - [phone]  Nishi Flaherty, RN Care Coordinator -  [phone]       Heart-Healthy Eating Nutrition Information  (Nutrition Care Manual)  Limit saturated fats and trans-fats:  -Foods high in saturated fats include fatty meat, poultry skin, ramirez, sausage, whole milk, cream, and butter.  -Instead of butter or stick margarine, try reduced-fat, whipped, or liquid spreads. Or use healthy oils (see below).  -Artificial trans-fats (bad fats) are now limited in the [place] food supply.     Eat more omega-3 fats (heart-healthy fats):  -Good choices include salmon, tuna, mackerel, and sardines. Aim to eat fish twice a week.  -Other foods with omega-3 fats include walnuts and canola and soybean oil.  -Flaxseed is another source of omega-3 fats. Have it as flaxseed oil or ground flaxseed.    Get 20 g to 30 g dietary fiber per day:  -Fruits, vegetables, whole grains, and dried beans are good sources of fiber:  -Aim for 5 cups of fruits and vegetables per day.  -Have 3 ounces (oz) of whole grain foods  every day.    Choose monounsaturated fats: Olive oil, canola oil, avocado, and unsalted nuts. Keep in mind, fats have additional calorie. Have more servings of these good fats if your goal is for weight gain or eat these foods in moderation, but choose more often than saturated fats, if your goal is to lose weight.     Limit desserts, sweets, and added sugar.    -Plan to eat more plant-based meals, using beans and soy foods for protein.  -Talk with your dietitian or doctor about what a healthy weight is for you. Set goals to reach and maintain that weight.  -Talk with your health care team to find out what types of physical activity are best for you. Set a plan to get about 30 minutes of exercise on most days.    Tips for Reducing Sodium (Salt)  Although sodium is important for your body to function, too much sodium can be harmful for people with high blood pressure.    As sodium and fluid buildup in your tissues and bloodstream, your blood pressure increases. High blood pressure may cause damage to other organs and increase your risk for a stroke.    Even if you take a pill for blood pressure or a water pill (diuretic) to remove fluid, it is still important to have less salt in your diet. Ask your doctor what amount of sodium is right for you.    -Avoid processed foods. Eat more fresh foods.  -Fresh fruits and vegetables are naturally low in sodium, as well as frozen vegetables and fruits that have no added juices or sauces.  -Fresh meats are lower in sodium than processed meats, such as ramirez, sausage, and hotdogs. Read the nutrition label or ask your  to help you find a fresh meat that is low in sodium.    -Eat less salt at the table and when cooking.  -A single teaspoon of table salt has 2,300 mg of sodium.  -Leave the salt out of recipes for pasta, casseroles, and soups.  -Ask your RDN how to cook your favorite recipes without sodium    Be a smart .  -Look for food packages that say  salt-free  or   sodium-free.  These items contain less than 5 milligrams of sodium per serving.  - Very low-sodium  products contain less than 35 milligrams of sodium per serving.  - Low-sodium  products contain less than 140 milligrams of sodium per serving.  -Beware of  reduced salt  or  reduced sodium  products. These items may still be high in sodium. Check the nutrition label.    Add flavors to your food without adding sodium.  -Try lemon juice, lime juice, fruit juice or vinegar.  -Dry or fresh herbs add flavor. Try basil, bay leaf, dill, rosemary, parsley, emy, dry mustard, nutmeg, thyme,and paprika.  -Pepper, red pepper flakes, and cayenne pepper can add spice to your meals without adding sodium. Hot sauce contains sodium, but if you use just a drop or two, it will not add up to much.  -Buy a sodium-free seasoning blend or make your own at home - do not buy salt substitute, as this contains significant amounts of potassium.    Food Group Foods Recommended  Grains: Whole grain breads and cereals, including oats and barley; Pasta, especially whole wheat or other whole grain types; Brown rice; Low-fat crackers and pretzels    Vegetables: Fresh, frozen, or canned vegetables without added fat or salt    Fruits: Fresh, frozen, canned, or dried fruit    Milk: Nonfat (skim), low-fat, or 1% fat milk or buttermilk; Nonfat or low-fat yogurt or cottage cheese; Fat-free and low-fat cheese    Meat and Other Protein Foods: Lean cuts of beef and pork (loin, leg, round, extra lean hamburger); Skinless poultry; Fish; Venison and other wild game; Dried beans and peas; Nuts and nut butters; Meat alternatives made with soy or textured vegetable protein; Eggs; Cold cuts made with lean meat or soy protein    Fats and Oils: Unsaturated oils (olive, peanut, soy, sunflower, canola); Soft or liquid margarines and vegetable oil spreads; Salad dressings; Seeds and nuts; Avocado    Food Group Foods Not Recommended  Grains: High-fat bakery products,  such as doughnuts, biscuits, croissants, Persian pastries, pies, cookies; Snacks made with partially hydrogenated oils, including chips, cheese puffs, snack mixes, regular crackers, butter-flavored popcorn  Vegetables: Fried vegetables; Vegetables prepared with butter, cheese, or cream sauce  Fruits: Fried fruits; Fruits served with butter or cream  Milk: Whole milk; 2% fat milk; Whole milk yogurt or ice cream; Cream; Half-&- half; Cream cheese; Sour cream; Cheese  Meat and Other Protein Food: Higher-fat cuts of meats (ribs, t-bone steak, regular hamburger); [place]; Sausage; Cold cuts, such as salami or bologna; Corned beef; Hot dogs; Organ meats (liver, brains, sweetbreads); Poultry with skin; Fried meat, poultry, and fish   Fats and Oils Butter: Stick margarine; Shortening; Partially hydrogenated oils; Tropical oils (coconut, palm, and palm kernel oils)    Sample One-Day Menu  Breakfast  1/2 cup apple juice  3/4 cup oatmeal  1 cup fat-free milk  1 small banana  1 cup brewed coffee  Lunch  2 slices whole-wheat bread  2 oz lean deli turkey breast  1 oz low-fat Swiss cheese  2 slices tomato  2 lettuce leaves  1 pear  1 cup nonfat milk  Afternoon Snack  1 oz trail mix (with nuts, seeds, raisins)  1 cup blueberries  1 cup nonfat milk  Evening Meal  3 oz broiled fish  1 cup brown rice  1 tsp margarine  1 medium stalk broccoli  1 medium carrot  1 cup tossed salad  1/8 cup chickpeas, for salad  1 tablespoon olive oil and vinegar dressing  1 small whole-wheat roll  1 tsp margarine  1/2 cup nonfat frozen yogurt  1/4 cup blueberries, with frozen yogurt  1 cup tea

## 2025-01-10 ENCOUNTER — APPOINTMENT (OUTPATIENT)
Dept: ULTRASOUND IMAGING | Facility: CLINIC | Age: 65
DRG: 003 | End: 2025-01-10
Payer: MEDICARE

## 2025-01-10 ENCOUNTER — APPOINTMENT (OUTPATIENT)
Dept: GENERAL RADIOLOGY | Facility: CLINIC | Age: 65
DRG: 003 | End: 2025-01-10
Attending: STUDENT IN AN ORGANIZED HEALTH CARE EDUCATION/TRAINING PROGRAM
Payer: MEDICARE

## 2025-01-10 LAB
ANION GAP SERPL CALCULATED.3IONS-SCNC: 10 MMOL/L (ref 7–15)
ANION GAP SERPL CALCULATED.3IONS-SCNC: 9 MMOL/L (ref 7–15)
BASE EXCESS BLDV CALC-SCNC: 1.1 MMOL/L (ref -3–3)
BUN SERPL-MCNC: 18.3 MG/DL (ref 8–23)
BUN SERPL-MCNC: 19.5 MG/DL (ref 8–23)
C DIFF TOX B STL QL: NEGATIVE
CALCIUM SERPL-MCNC: 7.3 MG/DL (ref 8.8–10.4)
CALCIUM SERPL-MCNC: 7.6 MG/DL (ref 8.8–10.4)
CHLORIDE SERPL-SCNC: 111 MMOL/L (ref 98–107)
CHLORIDE SERPL-SCNC: 112 MMOL/L (ref 98–107)
CREAT SERPL-MCNC: 0.63 MG/DL (ref 0.67–1.17)
CREAT SERPL-MCNC: 0.73 MG/DL (ref 0.67–1.17)
EGFRCR SERPLBLD CKD-EPI 2021: >90 ML/MIN/1.73M2
EGFRCR SERPLBLD CKD-EPI 2021: >90 ML/MIN/1.73M2
ERYTHROCYTE [DISTWIDTH] IN BLOOD BY AUTOMATED COUNT: 18.5 % (ref 10–15)
GLUCOSE BLDC GLUCOMTR-MCNC: 130 MG/DL (ref 70–99)
GLUCOSE BLDC GLUCOMTR-MCNC: 141 MG/DL (ref 70–99)
GLUCOSE BLDC GLUCOMTR-MCNC: 156 MG/DL (ref 70–99)
GLUCOSE BLDC GLUCOMTR-MCNC: 156 MG/DL (ref 70–99)
GLUCOSE BLDC GLUCOMTR-MCNC: 157 MG/DL (ref 70–99)
GLUCOSE BLDC GLUCOMTR-MCNC: 161 MG/DL (ref 70–99)
GLUCOSE SERPL-MCNC: 166 MG/DL (ref 70–99)
GLUCOSE SERPL-MCNC: 183 MG/DL (ref 70–99)
HCO3 BLDV-SCNC: 25 MMOL/L (ref 21–28)
HCO3 SERPL-SCNC: 22 MMOL/L (ref 22–29)
HCO3 SERPL-SCNC: 23 MMOL/L (ref 22–29)
HCT VFR BLD AUTO: 24.3 % (ref 40–53)
HGB BLD-MCNC: 7.4 G/DL (ref 13.3–17.7)
MAGNESIUM SERPL-MCNC: 2 MG/DL (ref 1.7–2.3)
MAGNESIUM SERPL-MCNC: 2.7 MG/DL (ref 1.7–2.3)
MCH RBC QN AUTO: 29 PG (ref 26.5–33)
MCHC RBC AUTO-ENTMCNC: 30.5 G/DL (ref 31.5–36.5)
MCV RBC AUTO: 95 FL (ref 78–100)
O2/TOTAL GAS SETTING VFR VENT: 30 %
OXYHGB MFR BLDV: 68 % (ref 70–75)
PCO2 BLDV: 33 MM HG (ref 40–50)
PH BLDV: 7.48 [PH] (ref 7.32–7.43)
PHOSPHATE SERPL-MCNC: 3 MG/DL (ref 2.5–4.5)
PLATELET # BLD AUTO: 276 10E3/UL (ref 150–450)
PO2 BLDV: 37 MM HG (ref 25–47)
POTASSIUM SERPL-SCNC: 3 MMOL/L (ref 3.4–5.3)
POTASSIUM SERPL-SCNC: 3.4 MMOL/L (ref 3.4–5.3)
RBC # BLD AUTO: 2.55 10E6/UL (ref 4.4–5.9)
SAO2 % BLDV: 69.8 % (ref 70–75)
SODIUM SERPL-SCNC: 142 MMOL/L (ref 135–145)
SODIUM SERPL-SCNC: 145 MMOL/L (ref 135–145)
WBC # BLD AUTO: 13.2 10E3/UL (ref 4–11)

## 2025-01-10 PROCEDURE — 250N000013 HC RX MED GY IP 250 OP 250 PS 637: Performed by: NURSE PRACTITIONER

## 2025-01-10 PROCEDURE — 84132 ASSAY OF SERUM POTASSIUM: CPT | Performed by: STUDENT IN AN ORGANIZED HEALTH CARE EDUCATION/TRAINING PROGRAM

## 2025-01-10 PROCEDURE — 250N000013 HC RX MED GY IP 250 OP 250 PS 637

## 2025-01-10 PROCEDURE — 93970 EXTREMITY STUDY: CPT

## 2025-01-10 PROCEDURE — 94640 AIRWAY INHALATION TREATMENT: CPT | Mod: 76

## 2025-01-10 PROCEDURE — 84132 ASSAY OF SERUM POTASSIUM: CPT | Performed by: INTERNAL MEDICINE

## 2025-01-10 PROCEDURE — 71045 X-RAY EXAM CHEST 1 VIEW: CPT | Mod: 26 | Performed by: RADIOLOGY

## 2025-01-10 PROCEDURE — 36415 COLL VENOUS BLD VENIPUNCTURE: CPT | Performed by: STUDENT IN AN ORGANIZED HEALTH CARE EDUCATION/TRAINING PROGRAM

## 2025-01-10 PROCEDURE — 82805 BLOOD GASES W/O2 SATURATION: CPT

## 2025-01-10 PROCEDURE — 250N000011 HC RX IP 250 OP 636: Performed by: PHYSICIAN ASSISTANT

## 2025-01-10 PROCEDURE — 200N000002 HC R&B ICU UMMC

## 2025-01-10 PROCEDURE — 80048 BASIC METABOLIC PNL TOTAL CA: CPT

## 2025-01-10 PROCEDURE — 250N000011 HC RX IP 250 OP 636: Performed by: INTERNAL MEDICINE

## 2025-01-10 PROCEDURE — 94640 AIRWAY INHALATION TREATMENT: CPT

## 2025-01-10 PROCEDURE — 250N000011 HC RX IP 250 OP 636

## 2025-01-10 PROCEDURE — 250N000011 HC RX IP 250 OP 636: Performed by: STUDENT IN AN ORGANIZED HEALTH CARE EDUCATION/TRAINING PROGRAM

## 2025-01-10 PROCEDURE — 250N000009 HC RX 250: Performed by: STUDENT IN AN ORGANIZED HEALTH CARE EDUCATION/TRAINING PROGRAM

## 2025-01-10 PROCEDURE — 250N000013 HC RX MED GY IP 250 OP 250 PS 637: Performed by: STUDENT IN AN ORGANIZED HEALTH CARE EDUCATION/TRAINING PROGRAM

## 2025-01-10 PROCEDURE — 250N000011 HC RX IP 250 OP 636: Performed by: NURSE PRACTITIONER

## 2025-01-10 PROCEDURE — 83735 ASSAY OF MAGNESIUM: CPT

## 2025-01-10 PROCEDURE — 250N000013 HC RX MED GY IP 250 OP 250 PS 637: Performed by: ANESTHESIOLOGY

## 2025-01-10 PROCEDURE — 87493 C DIFF AMPLIFIED PROBE: CPT

## 2025-01-10 PROCEDURE — 93970 EXTREMITY STUDY: CPT | Mod: 26 | Performed by: RADIOLOGY

## 2025-01-10 PROCEDURE — 71045 X-RAY EXAM CHEST 1 VIEW: CPT

## 2025-01-10 PROCEDURE — 250N000013 HC RX MED GY IP 250 OP 250 PS 637: Performed by: PHYSICIAN ASSISTANT

## 2025-01-10 PROCEDURE — 250N000009 HC RX 250

## 2025-01-10 PROCEDURE — 36415 COLL VENOUS BLD VENIPUNCTURE: CPT

## 2025-01-10 PROCEDURE — 83735 ASSAY OF MAGNESIUM: CPT | Performed by: STUDENT IN AN ORGANIZED HEALTH CARE EDUCATION/TRAINING PROGRAM

## 2025-01-10 PROCEDURE — 999N000157 HC STATISTIC RCP TIME EA 10 MIN

## 2025-01-10 PROCEDURE — 85041 AUTOMATED RBC COUNT: CPT | Performed by: NURSE PRACTITIONER

## 2025-01-10 PROCEDURE — 85018 HEMOGLOBIN: CPT | Performed by: NURSE PRACTITIONER

## 2025-01-10 PROCEDURE — 87040 BLOOD CULTURE FOR BACTERIA: CPT

## 2025-01-10 PROCEDURE — 94003 VENT MGMT INPAT SUBQ DAY: CPT

## 2025-01-10 PROCEDURE — 84100 ASSAY OF PHOSPHORUS: CPT | Performed by: PHYSICIAN ASSISTANT

## 2025-01-10 PROCEDURE — 999N000128 HC STATISTIC PERIPHERAL IV START W/O US GUIDANCE

## 2025-01-10 RX ORDER — METOLAZONE 5 MG/1
5 TABLET ORAL ONCE
Status: COMPLETED | OUTPATIENT
Start: 2025-01-10 | End: 2025-01-10

## 2025-01-10 RX ORDER — POTASSIUM CHLORIDE 20MEQ/15ML
20 LIQUID (ML) ORAL ONCE
Status: COMPLETED | OUTPATIENT
Start: 2025-01-11 | End: 2025-01-10

## 2025-01-10 RX ORDER — POTASSIUM CHLORIDE 1.5 G/1.58G
40 POWDER, FOR SOLUTION ORAL ONCE
Status: COMPLETED | OUTPATIENT
Start: 2025-01-10 | End: 2025-01-10

## 2025-01-10 RX ORDER — AMINO ACIDS/PROTEIN HYDROLYS 11G-40/45
1 LIQUID IN PACKET (ML) ORAL 2 TIMES DAILY
Status: DISCONTINUED | OUTPATIENT
Start: 2025-01-10 | End: 2025-01-16

## 2025-01-10 RX ORDER — FUROSEMIDE 10 MG/ML
40 INJECTION INTRAMUSCULAR; INTRAVENOUS ONCE
Status: COMPLETED | OUTPATIENT
Start: 2025-01-10 | End: 2025-01-10

## 2025-01-10 RX ORDER — MAGNESIUM SULFATE HEPTAHYDRATE 40 MG/ML
2 INJECTION, SOLUTION INTRAVENOUS ONCE
Status: COMPLETED | OUTPATIENT
Start: 2025-01-10 | End: 2025-01-10

## 2025-01-10 RX ORDER — POTASSIUM CHLORIDE 29.8 MG/ML
20 INJECTION INTRAVENOUS
Status: COMPLETED | OUTPATIENT
Start: 2025-01-10 | End: 2025-01-10

## 2025-01-10 RX ORDER — POTASSIUM CHLORIDE 20MEQ/15ML
40 LIQUID (ML) ORAL ONCE
Status: COMPLETED | OUTPATIENT
Start: 2025-01-10 | End: 2025-01-10

## 2025-01-10 RX ORDER — MULTIVITAMIN,THERAPEUTIC
1 TABLET ORAL DAILY
Status: DISCONTINUED | OUTPATIENT
Start: 2025-01-11 | End: 2025-01-25 | Stop reason: HOSPADM

## 2025-01-10 RX ADMIN — POTASSIUM CHLORIDE 20 MEQ: 29.8 INJECTION, SOLUTION INTRAVENOUS at 07:43

## 2025-01-10 RX ADMIN — Medication 10 MG: at 19:49

## 2025-01-10 RX ADMIN — CALCIUM CARBONATE (ANTACID) CHEW TAB 500 MG 1000 MG: 500 CHEW TAB at 07:49

## 2025-01-10 RX ADMIN — POTASSIUM CHLORIDE 40 MEQ: 1.5 POWDER, FOR SOLUTION ORAL at 12:02

## 2025-01-10 RX ADMIN — INSULIN ASPART 1 UNITS: 100 INJECTION, SOLUTION INTRAVENOUS; SUBCUTANEOUS at 05:19

## 2025-01-10 RX ADMIN — PROPOFOL 10 MCG/KG/MIN: 10 INJECTION, EMULSION INTRAVENOUS at 23:38

## 2025-01-10 RX ADMIN — POTASSIUM CHLORIDE 20 MEQ: 20 SOLUTION ORAL at 23:35

## 2025-01-10 RX ADMIN — POTASSIUM CHLORIDE 40 MEQ: 20 SOLUTION ORAL at 22:21

## 2025-01-10 RX ADMIN — Medication 6.25 MG: at 19:54

## 2025-01-10 RX ADMIN — CHLORHEXIDINE GLUCONATE 15 ML: 1.2 SOLUTION ORAL at 19:49

## 2025-01-10 RX ADMIN — BACLOFEN 10 MG: 10 TABLET ORAL at 16:16

## 2025-01-10 RX ADMIN — OXYCODONE HYDROCHLORIDE 5 MG: 5 TABLET ORAL at 12:54

## 2025-01-10 RX ADMIN — Medication 6.25 MG: at 07:59

## 2025-01-10 RX ADMIN — INSULIN ASPART 1 UNITS: 100 INJECTION, SOLUTION INTRAVENOUS; SUBCUTANEOUS at 07:58

## 2025-01-10 RX ADMIN — ACETYLCYSTEINE 2 ML: 200 SOLUTION ORAL; RESPIRATORY (INHALATION) at 16:24

## 2025-01-10 RX ADMIN — PROPOFOL 25 MCG/KG/MIN: 10 INJECTION, EMULSION INTRAVENOUS at 07:48

## 2025-01-10 RX ADMIN — FUROSEMIDE 40 MG: 10 INJECTION, SOLUTION INTRAMUSCULAR; INTRAVENOUS at 12:24

## 2025-01-10 RX ADMIN — BACLOFEN 10 MG: 10 TABLET ORAL at 23:35

## 2025-01-10 RX ADMIN — INSULIN ASPART 1 UNITS: 100 INJECTION, SOLUTION INTRAVENOUS; SUBCUTANEOUS at 19:29

## 2025-01-10 RX ADMIN — PIPERACILLIN AND TAZOBACTAM 4.5 G: 4; .5 INJECTION, POWDER, FOR SOLUTION INTRAVENOUS at 12:02

## 2025-01-10 RX ADMIN — INSULIN ASPART 1 UNITS: 100 INJECTION, SOLUTION INTRAVENOUS; SUBCUTANEOUS at 23:49

## 2025-01-10 RX ADMIN — METOLAZONE 5 MG: 5 TABLET ORAL at 12:02

## 2025-01-10 RX ADMIN — PIPERACILLIN AND TAZOBACTAM 4.5 G: 4; .5 INJECTION, POWDER, FOR SOLUTION INTRAVENOUS at 16:16

## 2025-01-10 RX ADMIN — MAGNESIUM SULFATE HEPTAHYDRATE 2 G: 2 INJECTION, SOLUTION INTRAVENOUS at 06:42

## 2025-01-10 RX ADMIN — HYDROXYZINE HYDROCHLORIDE 50 MG: 50 TABLET ORAL at 12:54

## 2025-01-10 RX ADMIN — PIPERACILLIN AND TAZOBACTAM 4.5 G: 4; .5 INJECTION, POWDER, FOR SOLUTION INTRAVENOUS at 05:33

## 2025-01-10 RX ADMIN — Medication 1250 MG: at 08:07

## 2025-01-10 RX ADMIN — Medication 1250 MG: at 19:49

## 2025-01-10 RX ADMIN — HEPARIN SODIUM 5000 UNITS: 5000 INJECTION, SOLUTION INTRAVENOUS; SUBCUTANEOUS at 23:35

## 2025-01-10 RX ADMIN — IPRATROPIUM BROMIDE AND ALBUTEROL SULFATE 3 ML: .5; 3 SOLUTION RESPIRATORY (INHALATION) at 19:40

## 2025-01-10 RX ADMIN — HEPARIN SODIUM 5000 UNITS: 5000 INJECTION, SOLUTION INTRAVENOUS; SUBCUTANEOUS at 16:16

## 2025-01-10 RX ADMIN — IPRATROPIUM BROMIDE AND ALBUTEROL SULFATE 3 ML: .5; 3 SOLUTION RESPIRATORY (INHALATION) at 16:25

## 2025-01-10 RX ADMIN — ACETYLCYSTEINE 2 ML: 200 SOLUTION ORAL; RESPIRATORY (INHALATION) at 11:51

## 2025-01-10 RX ADMIN — ACETAMINOPHEN 650 MG: 325 TABLET, FILM COATED ORAL at 16:16

## 2025-01-10 RX ADMIN — BACLOFEN 10 MG: 10 TABLET ORAL at 07:48

## 2025-01-10 RX ADMIN — SERTRALINE HYDROCHLORIDE 200 MG: 100 TABLET ORAL at 07:48

## 2025-01-10 RX ADMIN — Medication 60 ML: at 19:52

## 2025-01-10 RX ADMIN — CHLORHEXIDINE GLUCONATE 15 ML: 1.2 SOLUTION ORAL at 07:48

## 2025-01-10 RX ADMIN — PROPOFOL 10 MCG/KG/MIN: 10 INJECTION, EMULSION INTRAVENOUS at 12:56

## 2025-01-10 RX ADMIN — HEPARIN SODIUM 5000 UNITS: 5000 INJECTION, SOLUTION INTRAVENOUS; SUBCUTANEOUS at 07:49

## 2025-01-10 RX ADMIN — PIPERACILLIN AND TAZOBACTAM 4.5 G: 4; .5 INJECTION, POWDER, FOR SOLUTION INTRAVENOUS at 22:21

## 2025-01-10 RX ADMIN — IPRATROPIUM BROMIDE AND ALBUTEROL SULFATE 3 ML: .5; 3 SOLUTION RESPIRATORY (INHALATION) at 11:48

## 2025-01-10 RX ADMIN — INSULIN ASPART 1 UNITS: 100 INJECTION, SOLUTION INTRAVENOUS; SUBCUTANEOUS at 16:31

## 2025-01-10 RX ADMIN — ASPIRIN 81 MG CHEWABLE TABLET 162 MG: 81 TABLET CHEWABLE at 07:49

## 2025-01-10 RX ADMIN — HYDRALAZINE HYDROCHLORIDE 10 MG: 20 INJECTION INTRAMUSCULAR; INTRAVENOUS at 12:26

## 2025-01-10 RX ADMIN — POTASSIUM CHLORIDE 40 MEQ: 1.5 POWDER, FOR SOLUTION ORAL at 16:19

## 2025-01-10 RX ADMIN — ACETYLCYSTEINE 2 ML: 200 SOLUTION ORAL; RESPIRATORY (INHALATION) at 19:40

## 2025-01-10 RX ADMIN — Medication 15 ML: at 07:48

## 2025-01-10 RX ADMIN — ACETAMINOPHEN 650 MG: 325 TABLET, FILM COATED ORAL at 23:35

## 2025-01-10 RX ADMIN — Medication 60 ML: at 07:49

## 2025-01-10 RX ADMIN — PANTOPRAZOLE SODIUM 40 MG: 40 INJECTION, POWDER, FOR SOLUTION INTRAVENOUS at 07:47

## 2025-01-10 RX ADMIN — INSULIN GLARGINE 20 UNITS: 100 INJECTION, SOLUTION SUBCUTANEOUS at 07:58

## 2025-01-10 RX ADMIN — ROSUVASTATIN CALCIUM 40 MG: 20 TABLET, FILM COATED ORAL at 07:48

## 2025-01-10 RX ADMIN — PROPOFOL 25 MCG/KG/MIN: 10 INJECTION, EMULSION INTRAVENOUS at 01:27

## 2025-01-10 RX ADMIN — IPRATROPIUM BROMIDE AND ALBUTEROL SULFATE 3 ML: .5; 3 SOLUTION RESPIRATORY (INHALATION) at 08:20

## 2025-01-10 RX ADMIN — ACETAMINOPHEN 650 MG: 325 TABLET, FILM COATED ORAL at 07:48

## 2025-01-10 RX ADMIN — ACETYLCYSTEINE 2 ML: 200 SOLUTION ORAL; RESPIRATORY (INHALATION) at 08:20

## 2025-01-10 RX ADMIN — POTASSIUM CHLORIDE 20 MEQ: 29.8 INJECTION, SOLUTION INTRAVENOUS at 09:06

## 2025-01-10 RX ADMIN — CALCIUM CARBONATE (ANTACID) CHEW TAB 500 MG 1000 MG: 500 CHEW TAB at 16:16

## 2025-01-10 ASSESSMENT — ACTIVITIES OF DAILY LIVING (ADL)
ADLS_ACUITY_SCORE: 61
ADLS_ACUITY_SCORE: 57
ADLS_ACUITY_SCORE: 61
ADLS_ACUITY_SCORE: 57
ADLS_ACUITY_SCORE: 61
ADLS_ACUITY_SCORE: 61
ADLS_ACUITY_SCORE: 57
ADLS_ACUITY_SCORE: 61
ADLS_ACUITY_SCORE: 57
ADLS_ACUITY_SCORE: 61
ADLS_ACUITY_SCORE: 61

## 2025-01-10 NOTE — PROGRESS NOTES
[facility]    ICU Progress Note       Date of Admission:  12/31/2024    Assessment: Critical Care   Erwin Aguilar is a 64 year old male with a past medical history of DM2, HTN, PVD, and neuropathic pain who initially presented on 12/31/24 to the ED with arm pain, vomiting, and diarrhea; ACS and ST depression. He subsequently had VT arrest with 1 round of CPR, epinephrine, defibrillation. After ROSC had afib with RVR. Became hypotensive and was subsequently cannulated for VA ECMO on 1/2/25, s/p CABG x4, and arrived to the ICU at that time. He was de cannulated on 1/3/25 at bedside. ICU course complicated by large R ptx seen on chest CT on 1/4/25, CVTS Fellow placed chest tube at bedside with improvement of pneumothorax.       MAJOR CHANGES/UPDATES  - PST today, check VBG  - Bcx 1/4 positive, started on zosyn and vancomycin 1/9/25, will narrow based on C&S  - Monitor Na - continue FWF at 100mL/hr  - continue Right CT - timing for removal per surgery  - diurese with goal of net -1L 40 mg lasix + metolazone,   - recheck BMP + Mg in 14:00  - bilateral lower extremity US  - remove RIJ CVC  - C diff test    Plan: Critical Care     PLAN:  Neuro/ pain/ sedation:  # Acute postoperative pain  # Chronic pain with opioid dependence  # Neuropathy, PTA  # Sedation while on mechanical ventilation   # Agitation  # Delrium   Analgesia   - Gtt Dilaudid off   - Scheduled: baclofen    - PRN: oxycodone  Sedation    - Gtt: Precedex off, on fentanyl   - Scheduled: Melaontin 10 mg at bedtime    - PRN: hydroxyzine, olanzapine  RASS goal 0 to -1     # Generalized Anxiety, PTA   - Continue PTA Sertraline  - Restart PTA PRN hydroxyzine      # Encephalopathy of unclear etiology   - Prior to procedure  - 1/1 vEEG without seizure activity (severe encephalopathy) and 12/31, 1/4, and 1/7 CTH negative for acute findings.     Pulmonary care:   # Acute Hypoxic Respiratory failure   # Respiratory alkalosis  2:2 agitation - improved   # R tension pneumothorax   # Aspiration Pneumonia, Klebsiella  FiO2 (%): 30 %, Resp: 22, Inspiratory Pressure (cm H2O) (Drager Sonam): 18, Vent Mode: PCV PLUS (PCV/PSV), Resp Rate (Set): 14 breaths/min, Tidal Volume (Set, mL): 420 mL, PEEP (cm H2O): 5 cmH2O, Pressure Support (cm H2O): 10 cmH2O, Resp Rate (Set): 14 breaths/min, Tidal Volume (Set, mL): 420 mL, PEEP (cm H2O): 5 cmH2O   - ON of 1/4 CT C/A/P ordered for persistently elevated lactate which revealed large R tension pneumothorax. CVTS notified and fellow placed chest tube at bedside this morning with improvement of pneumothorax.   - PST today    - PRN nebs for secretion management   - VAP bundle while intubated  - Goal SpO2 > 92%  - CXR daily    - CXR this morning, per my review: small stable R sided pneumothorax after CT removal.   - respiratory cultures NGTD    Cardiovascular:    # Mixed Shock, cardiogenic, vasoplegic  # HTN, PTA   # VT Cardiac Arrest on VA ECMO (01/01/24 - 1/3)  # CAD s/p CABG x4  # Dyslipidemia, PTA   # Cardiomyopathy   1/5 TTE Normal LVEF 55-60%, normal RV function   - Monitor hemodynamics closely. Goal MAP >65, SBP <140   - Aspirin 81mg daily   - Crestor 40mg daily  - metoprolol 6.25 BID  - Hold PTA apixaban, Dyazide    GI care / Nutrition:   # At risk for protein calorie malnutrition   # Constipation   - Diet: TF @ goal via NJ   - PPI  - Last BM: 1/9. Bowel reg PRN    - C diff test 2/2 watery BM in the setting of ongoing fevers    Renal / Fluids / Electrolytes:   # Acute Kidney Injury 2/2 ATN - improving   # Hypokalemia   # Hypernatremia  # Hypervolemia  # urinary retention  BL creat appears to be ~ 0.6-0.8  - Na+ is 145, free water deficit calculated to be 2.3 L   - Strict I/O, daily weights, avoid/limit nephrotoxins  - Pt's K+ this morning 3.4, nursing high replacement protocol   - Na+ downtrending, 145 this AM, continue FWF to 100 mL/hr  - Replete lytes PRN per protocol  - urinalysis negative, ca in  place     Endocrine:    # Stress hyperglycemia  # Type 2 Diabetes Mellitus, PTA   - Preop A1c 5.7  - Goal BG <180 for optimal healing  - Lantus 20 units daily (PTA 26 units daily), sliding scale insulin     ID / Antibiotics:  # Stress induced leukocytosis  # Aspiration Pneumonia, Klebsiella  # febrile   - Broadened abx from CTX (12/31 to 1/4) to zosyn 1/4-1/6 narrowed to CTX 1/7  - CTX discontinued on 1/8, restarted zosyn 1/9/25, added vancomycin 1/9/25   - Monitor fever curve, WBC, and inflammatory markers as appropriate  - monitor Bcx: new cultures sent today      Cultures  1/8 G+ cocci in clusters in 1/4 cultures  1/4 UA non-infectious appearing negative LE, nitrites, WBCs, and bacteria   1/4 Sputum cx gram stain 1+ Klebsiella pneumoniae   1/3 blood cx NGTD @ 2 day   12/31 sputum: Klebsiella pneumonia with resistance to ampicillin   12/31 MRSA and blood cultures negative     Heme:     # Acute blood loss anemia  # Acute thrombocytopenia  No s/sx active bleeding  - Daily CBC   - Hgb 7.4 today, stable   - Hgb goal > 7.0, transfuse PRN   - DVT ppx: SQH      MSK / Skin:  # Sternotomy   # Surgical Incision  - Sternal precautions, postop incision management protocol  - PT/OT/CR  - RUE US negative for DVT  - check bilateral LE US to rule out DVT as fever cause    #PVD, PTA   - S/p right BKA, 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     Total Critical Care time spent by me, excluding procedures, was 45 minutes.        Vidal Leon MD  [facility]  Securely message with [employer] (more info)  Text page via [facility] Paging/Directory     Clinically Significant Risk Factors        # Hypokalemia: Lowest K = 2.9 mmol/L  "in last 2 days, will replace as needed  # Hypernatremia: Highest Na = 148 mmol/L in last 2 days, will monitor as appropriate  # Hyperchloremia: Highest Cl = 114 mmol/L in last 2 days, will monitor as appropriate          # Hypoalbuminemia: Lowest albumin = 2 g/dL at 1/2/2025  5:22 PM, will monitor as appropriate       # Hypertension: Noted on problem list     # Acute Hypoxic Respiratory Failure: Documented O2 saturation < 90%. Continue supplemental oxygen as needed  # Acute Hypercapnic Respiratory Failure: based on arterial blood gas results.  Continue supplemental oxygen and ventilatory support as indicated.  # Acute Hypercapnic Respiratory Failure: based on venous blood gas results.  Continue supplemental oxygen and ventilatory support as indicated.         # Overweight: Estimated body mass index is 27.49 kg/m  as calculated from the following:    Height as of this encounter: 1.803 m (5' 11\").    Weight as of this encounter: 89.4 kg (197 lb 1.5 oz).   # Severe Malnutrition: based on nutrition assessment    # Financial/Environmental Concerns: none  # Asthma: noted on problem list    # History of CABG: noted on surgical history       ____________________________________________________________________    Interval History   Patient is awake, eyes are tracking. Denies any pain at this time. Slow to follow directions    Intake/Output  I/O last 3 completed shifts:  In: 6010.27 [I.V.:1450.27; NG/GT:3240]  Out: 2620 [Urine:1745; Stool:575; Chest Tube:300]      Physical Exam   Vital Signs: Temp: 100.7  F (38.2  C) Temp src: Axillary BP: 132/58 Pulse: 83   Resp: 22 SpO2: 100 % O2 Device: Mechanical Ventilator    Weight: 197 lbs 1.46 oz    Neuro: Intubated. Awake and alert, eyes tracking but slow to follow commands  HEENT: Normocephalic, atraumatic. PERRL, and nonicteric.   CV: RRR on monitor, S1S2, all extremities well perfused   Respiratory: increased respiratory rate on PC RR 20, , PEEP 8 , FiO2 30 %. Equal chest " rise b/l.  R pleural with 80 ml of serosanguinous output, no bubbles in chest tube   GI: Abdomen soft. Non-distended. NJ in situ  : Voiding appropriately  MSK: Right BKA. Moves all extremities well with decreased upper limb strength. Sensation intact in all upper/lower extremities. Right UE swelling and subcutaneous emphysema.   Skin: Sternal incision open to air, no drainage, surrounding erythema or induration noted. Large hematoma on L groin near previous ECMO cannulation site.     Data   I reviewed all medications, new labs and imaging results over the last 24 hours.  Arterial Blood Gases   Recent Labs   Lab 01/06/25  0323 01/06/25  0014 01/05/25 1953 01/05/25  1423   PH 7.47* 7.49* 7.51* 7.54*   PCO2 38 37 35 32*   PO2 141* 114* 144* 109*   HCO3 28 28 28 27       Complete Blood Count   Recent Labs   Lab 01/10/25  0431 01/09/25  1619 01/09/25  0408 01/08/25  1545   WBC 13.2* 11.3* 9.5 8.0   HGB 7.4* 7.9* 7.7* 7.9*    279 209 178       Basic Metabolic Panel  Recent Labs   Lab 01/10/25  0439 01/10/25  0431 01/09/25  2319 01/09/25 2012 01/09/25  1959/25  1619 01/09/25  1100 01/09/25  1059 01/09/25  0422 01/09/25  0408   NA  --  145  --   --  145  --   --  145  --  146*   POTASSIUM  --  3.4  --   --  3.9 3.3*  --  3.4  --  3.4   CHLORIDE  --  112*  --   --  113*  --   --  112*  --  114*   CO2  --  23  --   --  23  --   --  24  --  24   BUN  --  18.3  --   --  18.4  --   --  20.5  --  20.5   CR  --  0.73  --   --  0.70  --   --  0.61*  --  0.69   * 166* 142* 150* 156*  --    < > 165*   < > 155*    < > = values in this interval not displayed.       Liver Function Tests  Recent Labs   Lab 01/04/25  0216   *   *   ALKPHOS 66   BILITOTAL 0.7   ALBUMIN 2.4*       Pancreatic Enzymes  No lab results found in last 7 days.      Coagulation Profile  Recent Labs   Lab 01/03/25  1124   PTT 34       IMAGING:  Recent Results (from the past 24 hours)   US Upper Extremity Venous Duplex Right     Narrative    EXAMINATION: DOPPLER VENOUS ULTRASOUND OF THE RIGHT UPPER EXTREMITY,  1/9/2025 11:38 AM     COMPARISON: None.    HISTORY: Right upper extremity swelling    TECHNIQUE:  Gray-scale evaluation with compression, spectral flow and  color Doppler assessment of the deep venous system of the right upper  extremity.    FINDINGS:  Right: The internal jugular, innominate, subclavian, and axillary  veins are not visualized secondary to overlying subcutaneous  emphysema. There is normal compressibility of the brachial, basilic  and cephalic veins.    Right upper arm subcutaneous edema.      Impression    IMPRESSION:  1.  No evidence of deep venous thrombosis in the right brachial vein.  The internal jugular, innominate, subclavian, and axillary veins are  not visualized secondary to overlying subcutaneous emphysema.  2.  Right upper arm subcutaneous edema.    I have personally reviewed the examination and initial interpretation  and I agree with the findings.    ABIGAIL WELLER MD         SYSTEM ID:  [id number]   XR Chest Port 1 View    Narrative    Exam: XR CHEST PORT 1 VIEW, 1/9/2025 6:06 PM    Indication: S/p mediastinal CT removal    Comparison: Same day at 0054 hours    Findings:   Portable, frontal, semiupright view of the chest. Postsurgical changes  of the chest. Intact midline sternotomy wires. Stable position of  multiple support devices including the right IJ sheath, endotracheal  tube, partially included feeding tube, epicardial leads, atrial  appendage clip and a right-sided chest tube. The left basilar chest  tube and mediastinal drain have been removed.    Trachea is midline. Right-sided pneumothorax. No significant  left-sided pneumothorax. Cardiomediastinal silhouette is stable.  Streaky perihilar and bibasilar opacities, similar to prior. No  significant pleural effusion. Increasing subcutaneous emphysema over  the right chest and extending into the right lateral neck.      Impression     Impression:  1. New right pneumothorax. Right-sided chest tube remains in place.  2. Continued extensive subcutaneous emphysema over the right chest  wall and extending into the right neck, increased from earlier same  day.  3. Postsurgical changes of the chest. Remaining support devices are in  stable position. Continued perihilar atelectasis/edema.    [Result: Right-sided pneumothorax]    Finding was identified on 1/9/2025 8:59 PM.     Georges Prieto RN was contacted by Dr. Carmona at 1/9/2025 9:05 PM via  [employer] and confirmed receipt.    I have personally reviewed the examination and initial interpretation  and I agree with the findings.    ABIGAIL WELLER MD         SYSTEM ID:  [id number]   XR Chest Port 1 View    Impression    RESIDENT PRELIMINARY INTERPRETATION  Impression:  1. No appreciable pneumothorax. Stable positioning of the right  apically oriented chest tube.  2. Increased hazy opacification of the right upper lung, possibly  positional versus atelectasis.  3. Similar to slightly increased subcutaneous emphysema along the  right chest wall and right neck.  4. Similar streaky perihilar and basilar opacities, favor atelectasis  and/or pulmonary edema.

## 2025-01-10 NOTE — PHARMACY-VANCOMYCIN DOSING SERVICE
"Pharmacy Vancomycin Initial Note  Date of Service 2025  Patient's  1960  64 year old, male    Indication: Bacteremia    Current estimated CrCl = Estimated Creatinine Clearance: 133 mL/min (based on SCr of 0.7 mg/dL).    Creatinine for last 3 days  2025:  4:23 AM Creatinine 1.04 mg/dL; 10:45 AM Creatinine 1.04 mg/dL;  3:35 PM Creatinine 1.03 mg/dL  2025:  3:54 AM Creatinine 0.79 mg/dL; 11:26 AM Creatinine 0.75 mg/dL;  9:36 PM Creatinine 0.71 mg/dL  2025:  4:08 AM Creatinine 0.69 mg/dL; 10:59 AM Creatinine 0.61 mg/dL;  7:59 PM Creatinine 0.70 mg/dL    Recent Vancomycin Level(s) for last 3 days  No results found for requested labs within last 3 days.      Vancomycin IV Administrations (past 72 hours)                     vancomycin (VANCOCIN) 2,250 mg in sodium chloride 0.9 % 297.5 mL intermittent infusion (mg) 2,250 mg New Bag 25                    Nephrotoxins and other renal medications (From now, onward)      Start     Dose/Rate Route Frequency Ordered Stop    01/10/25 0800  vancomycin (VANCOCIN) 1,250 mg in 0.9% NaCl 250 mL intermittent infusion         1,250 mg  166.7 mL/hr over 90 Minutes Intravenous EVERY 12 HOURS 25 2202      25 1100  piperacillin-tazobactam (ZOSYN) 4.5 g vial to attach to  mL bag        Note to Pharmacy: For [facility], [facility] and [facility]: For Zosyn-naive patients, use the \"Zosyn initial dose + extended infusion\" order panel.    4.5 g  over 30 Minutes Intravenous EVERY 6 HOURS 25 1036              Contrast Orders - past 72 hours (72h ago, onward)      None            [employer] Prediction of Planned Initial Vancomycin Regimen  Loading dose: 2250 mg iv x 1  Regimen: 1250 mg IV every 12 hours.  Start time: 08:00 on 01/10/2025  Exposure target: AUC24 (range)400-600 mg/L.hr   AUC24,ss: 504 mg/L.hr  Probability of AUC24 > 400: 74 %  Ctrough,ss: 15.4 mg/L  Probability of Ctrough,ss > 20: 29 %  Probability of nephrotoxicity (Lodise MYRIAM 2009): 11 " %          Plan:  Start vancomycin 2250 mg iv x 1 then 1250 mg iv q12h  Vancomycin monitoring method: AUC  Vancomycin therapeutic monitoring goal: 400-600 mg*h/L  Pharmacy will check vancomycin levels as appropriate in 1-3 Days.    Serum creatinine levels will be ordered daily for the first week of therapy and at least twice weekly for subsequent weeks.      Carol Louis, AllyssaD

## 2025-01-10 NOTE — PROGRESS NOTES
"CLINICAL NUTRITION SERVICES - BRIEF NOTE      Reason for RD note: Adjusting TF formula    New Findings/Chart Review:  -Pt having loose, watery stools per RN    Interventions:  -NEW GOAL TF: Vital 1.5 Lalito @ goal of 60ml/hr (1440ml/day) + 2 pkt Prosource TF20 will provide: 2320 kcals (29 kcal/kg), 137 g PRO (1.7 g/kg), 1100 ml free H20, 269 g CHO, and 8 g fiber daily.    --Please transition to new TF formula immediately when arrives at pt room and ok to start at new goal rate    Nutrition will follow per protocol or sooner if consulted.    Jacklyn Bruce, MS, RD, LD  Available on [employer] - \"4A Clinical Dietitian\"  Weekend/Holiday RD - \"Weekend Clinical Dietitian\"  "

## 2025-01-10 NOTE — PLAN OF CARE
Neuro: PERRL. Following commands, OGLESBY minimally. Febrile  TMAX. 39.2,  PS from 0448-6361, restarted prop/fent d/t WOB & anxiety level  CV: SR 80-90s. Pressures increased, PRN hydralazine x1. Right arm edema, weeping.   Pulm:  PC-PSV 14 rate, 5 peep, 30%, pressure 18, scant secretions, tolerating well. Lasix 40mg & Metolazone 5mg given                                     GI: NJ @ 100. TF goal@ 60 mL/hr. 100 q1 hour FWF. Rectal tube with watery green output. TF changed to Vital AF for liquid stools. C.diff tested & negative  : Rodriguez. Good UO  Skin:  Scabs/bruising scattered.  Blister on first finger of right hand.  Blanchable erythema on sacrum/coccyx.  Weeping R arm, Sutured ecmo site to right groin - red and dry drainage.  Drips:  Prop 20  Fent 50  Problem: ECLS (Extracorporeal Life Support)  Goal: Hemodynamic Stability  Outcome: Progressing  Intervention: Optimize Blood Flow, Oxygenation and Ventilation  Recent Flowsheet Documentation  Taken 1/10/2025 1600 by Dustin Reich RN  VTE Prevention/Management: SCDs off (sequential compression devices)  Environmental Support: calm environment promoted  Taken 1/10/2025 1200 by Dustin Reich RN  VTE Prevention/Management: SCDs off (sequential compression devices)  Environmental Support: calm environment promoted  Taken 1/10/2025 0800 by Dustin Reich RN  VTE Prevention/Management: SCDs off (sequential compression devices)  Environmental Support: calm environment promoted     Problem: Comorbidity Management  Goal: Blood Glucose Levels Within Targeted Range  Outcome: Progressing  Intervention: Monitor and Manage Glycemia  Recent Flowsheet Documentation  Taken 1/10/2025 1600 by Dustin Reich RN  Medication Review/Management: medications reviewed  Taken 1/10/2025 1200 by Dustin Reich RN  Medication Review/Management: medications reviewed  Taken 1/10/2025 0800 by Dustin Reich RN  Medication Review/Management: medications reviewed  Goal: Blood Pressure in  Desired Range  Outcome: Progressing  Intervention: Maintain Blood Pressure Management  Recent Flowsheet Documentation  Taken 1/10/2025 1600 by Dustin Reich RN  Medication Review/Management: medications reviewed  Taken 1/10/2025 1200 by Dustin Reich RN  Medication Review/Management: medications reviewed  Taken 1/10/2025 0800 by Dustin Reich RN  Medication Review/Management: medications reviewed     Problem: Risk for Delirium  Goal: Improved Behavioral Control  Outcome: Not Progressing  Intervention: Minimize Safety Risk  Recent Flowsheet Documentation  Taken 1/10/2025 1600 by Dustin Reich RN  Enhanced Safety Measures: pain management  Trust Relationship/Rapport:   care explained   reassurance provided  Taken 1/10/2025 1200 by Dustin Reich RN  Enhanced Safety Measures: pain management  Trust Relationship/Rapport:   care explained   reassurance provided  Taken 1/10/2025 0800 by Dustin Reich RN  Enhanced Safety Measures: pain management  Trust Relationship/Rapport:   care explained   reassurance provided   Goal Outcome Evaluation:

## 2025-01-10 NOTE — PROGRESS NOTES
CVTS Brief:    CVTS wound check. Right groin ECMO decan site evaluated. Soft, minimal erythema. 3 stay sutures removed. Mild superficial dehiscence. Remaining sutures to be removed in 1 week or prior to discharge. Continue daily wound care. Left leg SVG harvest site staples removed.     Daily wound care: wound cleanser/soap & water, pat dry, keep wound clean and dry.     Terri Norman PA-C   Cardiothoracic Surgery   January 10, 2025 at 9:45 AM  Please reach me on [employer] or secure chat

## 2025-01-10 NOTE — PROGRESS NOTES
Neuro:   PERRL. Following commands, OGLESBY. Febrile 38.4 TMAX. Increased anxiety today, failing PST --> restarted prop/fent.     CV:   SR 90s. Pressures increased, PRN hydralazine x1. Right arm edema, weeping.     Pulm:   Failed x2 pressure support trails today with sustained tachypnea and poor VBGs. Flipped to pressure control this evening 14 rate, 5 peep, 30%, peak insp 20                                     GI:   NG @ 63. TF goal@ 55 mL/hr. 100 q1 hour FWF. Rectal tube with watery output.     :  Rodriguez. Good UO    Skin:  Scabs/bruising scattered.  Blister on first finger of right hand.  Blanchable erythema on sacrum/coccyx.  Weeping R arm    Drips:  Prop 20  Fent 75    Labs: Replaced K+, Phos. Positive blood cultures.    Intake/Output Summary (Last 24 hours) at 1/9/2025 1847  Last data filed at 1/9/2025 1800  Gross per 24 hour   Intake 5920.74 ml   Output 2545 ml   Net 3375.74 ml     Documenting RN assumed care of this patient from Date: 1/9/2025 Time: 2745-4781.  For vital signs, drip titrations, and complete assessments, please see documentation flowsheets; assessments charted by exception.  All ICU standards of care were followed.  Any critical lab values were called to MD within 5 minutes of writer receiving them from lab.     Mika Wing, RN, BSN

## 2025-01-10 NOTE — PLAN OF CARE
Goal Outcome Evaluation:       Updated: Georges TRUJILLO RN 1/10 0600    Neuro: PERRL. Following commands, OGLESBY. Febrile  TMAX. 38.2,  Increased anxiety yesterday, failed PST, restarted prop/fent.     CV: SR 90s. Pressures increased, PRN hydralazine x1. Right arm edema, weeping.     Pulm:  PC-PSV 14 rate, 5 peep, 30%, pressure 18, scant secretions, tolerating well.                                         GI: NG @ 63. TF goal@ 55 mL/hr. 100 q1 hour FWF. Rectal tube with watery green output.     : Rodriguez. Good UO    Skin:  Scabs/bruising scattered.  Blister on first finger of right hand.  Blanchable erythema on sacrum/coccyx.  Weeping R arm, Sutured ecmo site to right groin - red and dry drainage.    Drips:  Prop 20  Fent 75    Labs: Replaced K+, Mag. Positive blood cultures.

## 2025-01-11 ENCOUNTER — APPOINTMENT (OUTPATIENT)
Dept: GENERAL RADIOLOGY | Facility: CLINIC | Age: 65
DRG: 003 | End: 2025-01-11
Attending: STUDENT IN AN ORGANIZED HEALTH CARE EDUCATION/TRAINING PROGRAM
Payer: MEDICARE

## 2025-01-11 ENCOUNTER — APPOINTMENT (OUTPATIENT)
Dept: GENERAL RADIOLOGY | Facility: CLINIC | Age: 65
DRG: 003 | End: 2025-01-11
Attending: NURSE PRACTITIONER
Payer: MEDICARE

## 2025-01-11 ENCOUNTER — APPOINTMENT (OUTPATIENT)
Dept: SPEECH THERAPY | Facility: CLINIC | Age: 65
DRG: 003 | End: 2025-01-11
Payer: MEDICARE

## 2025-01-11 LAB
ABO + RH BLD: NORMAL
ANION GAP SERPL CALCULATED.3IONS-SCNC: 13 MMOL/L (ref 7–15)
ANION GAP SERPL CALCULATED.3IONS-SCNC: 9 MMOL/L (ref 7–15)
BACTERIA SPT CULT: ABNORMAL
BLD GP AB SCN SERPL QL: NEGATIVE
BUN SERPL-MCNC: 17.1 MG/DL (ref 8–23)
BUN SERPL-MCNC: 19 MG/DL (ref 8–23)
CALCIUM SERPL-MCNC: 7.4 MG/DL (ref 8.8–10.4)
CALCIUM SERPL-MCNC: 8 MG/DL (ref 8.8–10.4)
CHLORIDE SERPL-SCNC: 106 MMOL/L (ref 98–107)
CHLORIDE SERPL-SCNC: 110 MMOL/L (ref 98–107)
CREAT SERPL-MCNC: 0.58 MG/DL (ref 0.67–1.17)
CREAT SERPL-MCNC: 0.63 MG/DL (ref 0.67–1.17)
EGFRCR SERPLBLD CKD-EPI 2021: >90 ML/MIN/1.73M2
EGFRCR SERPLBLD CKD-EPI 2021: >90 ML/MIN/1.73M2
ERYTHROCYTE [DISTWIDTH] IN BLOOD BY AUTOMATED COUNT: 17.9 % (ref 10–15)
GLUCOSE BLDC GLUCOMTR-MCNC: 164 MG/DL (ref 70–99)
GLUCOSE BLDC GLUCOMTR-MCNC: 173 MG/DL (ref 70–99)
GLUCOSE BLDC GLUCOMTR-MCNC: 175 MG/DL (ref 70–99)
GLUCOSE BLDC GLUCOMTR-MCNC: 178 MG/DL (ref 70–99)
GLUCOSE BLDC GLUCOMTR-MCNC: 185 MG/DL (ref 70–99)
GLUCOSE SERPL-MCNC: 176 MG/DL (ref 70–99)
GLUCOSE SERPL-MCNC: 187 MG/DL (ref 70–99)
GRAM STAIN RESULT: ABNORMAL
GRAM STAIN RESULT: ABNORMAL
HCO3 SERPL-SCNC: 22 MMOL/L (ref 22–29)
HCO3 SERPL-SCNC: 23 MMOL/L (ref 22–29)
HCT VFR BLD AUTO: 23.5 % (ref 40–53)
HGB BLD-MCNC: 7.5 G/DL (ref 13.3–17.7)
HOLD SPECIMEN: NORMAL
MAGNESIUM SERPL-MCNC: 2 MG/DL (ref 1.7–2.3)
MAGNESIUM SERPL-MCNC: 2.2 MG/DL (ref 1.7–2.3)
MCH RBC QN AUTO: 29.6 PG (ref 26.5–33)
MCHC RBC AUTO-ENTMCNC: 31.9 G/DL (ref 31.5–36.5)
MCV RBC AUTO: 93 FL (ref 78–100)
PLATELET # BLD AUTO: 290 10E3/UL (ref 150–450)
POTASSIUM SERPL-SCNC: 2.4 MMOL/L (ref 3.4–5.3)
POTASSIUM SERPL-SCNC: 2.7 MMOL/L (ref 3.4–5.3)
POTASSIUM SERPL-SCNC: 2.9 MMOL/L (ref 3.4–5.3)
POTASSIUM SERPL-SCNC: 2.9 MMOL/L (ref 3.4–5.3)
POTASSIUM SERPL-SCNC: 3.1 MMOL/L (ref 3.4–5.3)
RBC # BLD AUTO: 2.53 10E6/UL (ref 4.4–5.9)
SODIUM SERPL-SCNC: 141 MMOL/L (ref 135–145)
SODIUM SERPL-SCNC: 142 MMOL/L (ref 135–145)
SPECIMEN EXP DATE BLD: NORMAL
VANCOMYCIN SERPL-MCNC: 11.2 UG/ML
VANCOMYCIN SERPL-MCNC: 34.7 UG/ML
WBC # BLD AUTO: 10.2 10E3/UL (ref 4–11)

## 2025-01-11 PROCEDURE — 99233 SBSQ HOSP IP/OBS HIGH 50: CPT | Mod: 24 | Performed by: STUDENT IN AN ORGANIZED HEALTH CARE EDUCATION/TRAINING PROGRAM

## 2025-01-11 PROCEDURE — 250N000009 HC RX 250: Performed by: STUDENT IN AN ORGANIZED HEALTH CARE EDUCATION/TRAINING PROGRAM

## 2025-01-11 PROCEDURE — 250N000011 HC RX IP 250 OP 636: Performed by: NURSE PRACTITIONER

## 2025-01-11 PROCEDURE — 84132 ASSAY OF SERUM POTASSIUM: CPT | Performed by: NURSE PRACTITIONER

## 2025-01-11 PROCEDURE — 74018 RADEX ABDOMEN 1 VIEW: CPT | Mod: 26 | Performed by: STUDENT IN AN ORGANIZED HEALTH CARE EDUCATION/TRAINING PROGRAM

## 2025-01-11 PROCEDURE — 82310 ASSAY OF CALCIUM: CPT | Performed by: NURSE PRACTITIONER

## 2025-01-11 PROCEDURE — 82565 ASSAY OF CREATININE: CPT | Performed by: NURSE PRACTITIONER

## 2025-01-11 PROCEDURE — 250N000013 HC RX MED GY IP 250 OP 250 PS 637: Performed by: NURSE PRACTITIONER

## 2025-01-11 PROCEDURE — 71045 X-RAY EXAM CHEST 1 VIEW: CPT

## 2025-01-11 PROCEDURE — 83735 ASSAY OF MAGNESIUM: CPT | Performed by: STUDENT IN AN ORGANIZED HEALTH CARE EDUCATION/TRAINING PROGRAM

## 2025-01-11 PROCEDURE — 87205 SMEAR GRAM STAIN: CPT | Performed by: NURSE PRACTITIONER

## 2025-01-11 PROCEDURE — 250N000011 HC RX IP 250 OP 636: Performed by: STUDENT IN AN ORGANIZED HEALTH CARE EDUCATION/TRAINING PROGRAM

## 2025-01-11 PROCEDURE — 87075 CULTR BACTERIA EXCEPT BLOOD: CPT | Performed by: NURSE PRACTITIONER

## 2025-01-11 PROCEDURE — 250N000013 HC RX MED GY IP 250 OP 250 PS 637

## 2025-01-11 PROCEDURE — 94799 UNLISTED PULMONARY SVC/PX: CPT

## 2025-01-11 PROCEDURE — 250N000013 HC RX MED GY IP 250 OP 250 PS 637: Performed by: STUDENT IN AN ORGANIZED HEALTH CARE EDUCATION/TRAINING PROGRAM

## 2025-01-11 PROCEDURE — 80202 ASSAY OF VANCOMYCIN: CPT

## 2025-01-11 PROCEDURE — 200N000002 HC R&B ICU UMMC

## 2025-01-11 PROCEDURE — 999N000259 HC STATISTIC EXTUBATION

## 2025-01-11 PROCEDURE — 92610 EVALUATE SWALLOWING FUNCTION: CPT | Mod: GN

## 2025-01-11 PROCEDURE — 36415 COLL VENOUS BLD VENIPUNCTURE: CPT

## 2025-01-11 PROCEDURE — 80048 BASIC METABOLIC PNL TOTAL CA: CPT | Performed by: NURSE PRACTITIONER

## 2025-01-11 PROCEDURE — 71045 X-RAY EXAM CHEST 1 VIEW: CPT | Mod: 26 | Performed by: STUDENT IN AN ORGANIZED HEALTH CARE EDUCATION/TRAINING PROGRAM

## 2025-01-11 PROCEDURE — 250N000013 HC RX MED GY IP 250 OP 250 PS 637: Performed by: PHYSICIAN ASSISTANT

## 2025-01-11 PROCEDURE — 250N000013 HC RX MED GY IP 250 OP 250 PS 637: Performed by: ANESTHESIOLOGY

## 2025-01-11 PROCEDURE — 84132 ASSAY OF SERUM POTASSIUM: CPT

## 2025-01-11 PROCEDURE — 94640 AIRWAY INHALATION TREATMENT: CPT

## 2025-01-11 PROCEDURE — 250N000011 HC RX IP 250 OP 636: Performed by: PHYSICIAN ASSISTANT

## 2025-01-11 PROCEDURE — 999N000157 HC STATISTIC RCP TIME EA 10 MIN

## 2025-01-11 PROCEDURE — 36415 COLL VENOUS BLD VENIPUNCTURE: CPT | Performed by: NURSE PRACTITIONER

## 2025-01-11 PROCEDURE — 80202 ASSAY OF VANCOMYCIN: CPT | Performed by: NURSE PRACTITIONER

## 2025-01-11 PROCEDURE — 86850 RBC ANTIBODY SCREEN: CPT | Performed by: NURSE PRACTITIONER

## 2025-01-11 PROCEDURE — 74018 RADEX ABDOMEN 1 VIEW: CPT

## 2025-01-11 PROCEDURE — 94640 AIRWAY INHALATION TREATMENT: CPT | Mod: 76

## 2025-01-11 PROCEDURE — 83735 ASSAY OF MAGNESIUM: CPT | Performed by: NURSE PRACTITIONER

## 2025-01-11 PROCEDURE — 250N000009 HC RX 250

## 2025-01-11 PROCEDURE — 250N000011 HC RX IP 250 OP 636

## 2025-01-11 PROCEDURE — 87070 CULTURE OTHR SPECIMN AEROBIC: CPT | Performed by: NURSE PRACTITIONER

## 2025-01-11 PROCEDURE — 86900 BLOOD TYPING SEROLOGIC ABO: CPT | Performed by: NURSE PRACTITIONER

## 2025-01-11 PROCEDURE — 80048 BASIC METABOLIC PNL TOTAL CA: CPT

## 2025-01-11 PROCEDURE — 94003 VENT MGMT INPAT SUBQ DAY: CPT

## 2025-01-11 PROCEDURE — 84295 ASSAY OF SERUM SODIUM: CPT

## 2025-01-11 PROCEDURE — 82310 ASSAY OF CALCIUM: CPT

## 2025-01-11 PROCEDURE — 71045 X-RAY EXAM CHEST 1 VIEW: CPT | Mod: 77

## 2025-01-11 PROCEDURE — 85027 COMPLETE CBC AUTOMATED: CPT

## 2025-01-11 PROCEDURE — 999N000156 HC STATISTIC RCP CONSULT EA 30 MIN

## 2025-01-11 PROCEDURE — 92526 ORAL FUNCTION THERAPY: CPT | Mod: GN

## 2025-01-11 RX ORDER — POTASSIUM CHLORIDE 1.5 G/1.58G
40 POWDER, FOR SOLUTION ORAL ONCE
Status: COMPLETED | OUTPATIENT
Start: 2025-01-11 | End: 2025-01-11

## 2025-01-11 RX ORDER — CEFTRIAXONE 2 G/1
2 INJECTION, POWDER, FOR SOLUTION INTRAMUSCULAR; INTRAVENOUS EVERY 24 HOURS
Status: DISCONTINUED | OUTPATIENT
Start: 2025-01-11 | End: 2025-01-11

## 2025-01-11 RX ORDER — IPRATROPIUM BROMIDE AND ALBUTEROL SULFATE 2.5; .5 MG/3ML; MG/3ML
3 SOLUTION RESPIRATORY (INHALATION)
Status: DISCONTINUED | OUTPATIENT
Start: 2025-01-11 | End: 2025-01-14

## 2025-01-11 RX ORDER — POTASSIUM CHLORIDE 1.5 G/1.58G
20 POWDER, FOR SOLUTION ORAL ONCE
Status: COMPLETED | OUTPATIENT
Start: 2025-01-11 | End: 2025-01-11

## 2025-01-11 RX ORDER — FUROSEMIDE 10 MG/ML
60 INJECTION INTRAMUSCULAR; INTRAVENOUS ONCE
Status: COMPLETED | OUTPATIENT
Start: 2025-01-11 | End: 2025-01-11

## 2025-01-11 RX ORDER — METOLAZONE 5 MG/1
5 TABLET ORAL ONCE
Status: COMPLETED | OUTPATIENT
Start: 2025-01-11 | End: 2025-01-11

## 2025-01-11 RX ORDER — PIPERACILLIN SODIUM, TAZOBACTAM SODIUM 4; .5 G/20ML; G/20ML
4.5 INJECTION, POWDER, LYOPHILIZED, FOR SOLUTION INTRAVENOUS EVERY 6 HOURS
Status: DISCONTINUED | OUTPATIENT
Start: 2025-01-11 | End: 2025-01-13

## 2025-01-11 RX ORDER — POTASSIUM CHLORIDE 20MEQ/15ML
20 LIQUID (ML) ORAL ONCE
Status: COMPLETED | OUTPATIENT
Start: 2025-01-11 | End: 2025-01-11

## 2025-01-11 RX ORDER — TRAZODONE HYDROCHLORIDE 50 MG/1
50 TABLET, FILM COATED ORAL AT BEDTIME
Status: DISCONTINUED | OUTPATIENT
Start: 2025-01-11 | End: 2025-01-12

## 2025-01-11 RX ORDER — ACETYLCYSTEINE 200 MG/ML
2 SOLUTION ORAL; RESPIRATORY (INHALATION) 2 TIMES DAILY
Status: DISCONTINUED | OUTPATIENT
Start: 2025-01-11 | End: 2025-01-14

## 2025-01-11 RX ORDER — POTASSIUM CHLORIDE 20MEQ/15ML
40 LIQUID (ML) ORAL ONCE
Status: COMPLETED | OUTPATIENT
Start: 2025-01-11 | End: 2025-01-11

## 2025-01-11 RX ORDER — VANCOMYCIN HYDROCHLORIDE
1250 EVERY 12 HOURS
Status: DISCONTINUED | OUTPATIENT
Start: 2025-01-11 | End: 2025-01-20

## 2025-01-11 RX ADMIN — SERTRALINE HYDROCHLORIDE 200 MG: 100 TABLET ORAL at 07:26

## 2025-01-11 RX ADMIN — THERA TABS 1 TABLET: TAB at 07:26

## 2025-01-11 RX ADMIN — HEPARIN SODIUM 5000 UNITS: 5000 INJECTION, SOLUTION INTRAVENOUS; SUBCUTANEOUS at 15:52

## 2025-01-11 RX ADMIN — Medication 6.25 MG: at 07:28

## 2025-01-11 RX ADMIN — HEPARIN SODIUM 5000 UNITS: 5000 INJECTION, SOLUTION INTRAVENOUS; SUBCUTANEOUS at 07:28

## 2025-01-11 RX ADMIN — METOLAZONE 5 MG: 5 TABLET ORAL at 07:28

## 2025-01-11 RX ADMIN — Medication 12.5 MG: at 19:58

## 2025-01-11 RX ADMIN — POTASSIUM CHLORIDE 20 MEQ: 20 SOLUTION ORAL at 07:26

## 2025-01-11 RX ADMIN — ACETYLCYSTEINE 2 ML: 200 SOLUTION ORAL; RESPIRATORY (INHALATION) at 08:14

## 2025-01-11 RX ADMIN — POTASSIUM CHLORIDE 40 MEQ: 20 SOLUTION ORAL at 05:32

## 2025-01-11 RX ADMIN — POTASSIUM CHLORIDE 40 MEQ: 1.5 POWDER, FOR SOLUTION ORAL at 18:33

## 2025-01-11 RX ADMIN — ROSUVASTATIN CALCIUM 40 MG: 20 TABLET, FILM COATED ORAL at 07:27

## 2025-01-11 RX ADMIN — CALCIUM CARBONATE (ANTACID) CHEW TAB 500 MG 1000 MG: 500 CHEW TAB at 15:52

## 2025-01-11 RX ADMIN — POTASSIUM CHLORIDE 20 MEQ: 1.5 POWDER, FOR SOLUTION ORAL at 19:59

## 2025-01-11 RX ADMIN — Medication 5 MG: at 19:58

## 2025-01-11 RX ADMIN — ASPIRIN 81 MG CHEWABLE TABLET 162 MG: 81 TABLET CHEWABLE at 07:26

## 2025-01-11 RX ADMIN — IPRATROPIUM BROMIDE AND ALBUTEROL SULFATE 3 ML: .5; 3 SOLUTION RESPIRATORY (INHALATION) at 20:09

## 2025-01-11 RX ADMIN — Medication 6.25 MG: at 10:41

## 2025-01-11 RX ADMIN — CEFTRIAXONE SODIUM 2 G: 2 INJECTION, POWDER, FOR SOLUTION INTRAMUSCULAR; INTRAVENOUS at 10:42

## 2025-01-11 RX ADMIN — Medication 50 MCG/HR: at 03:15

## 2025-01-11 RX ADMIN — PIPERACILLIN AND TAZOBACTAM 4.5 G: 4; .5 INJECTION, POWDER, FOR SOLUTION INTRAVENOUS at 13:13

## 2025-01-11 RX ADMIN — TRAZODONE HYDROCHLORIDE 50 MG: 50 TABLET ORAL at 19:58

## 2025-01-11 RX ADMIN — IPRATROPIUM BROMIDE AND ALBUTEROL SULFATE 3 ML: .5; 3 SOLUTION RESPIRATORY (INHALATION) at 08:14

## 2025-01-11 RX ADMIN — ACETAMINOPHEN 650 MG: 325 TABLET, FILM COATED ORAL at 19:58

## 2025-01-11 RX ADMIN — INSULIN ASPART 1 UNITS: 100 INJECTION, SOLUTION INTRAVENOUS; SUBCUTANEOUS at 15:54

## 2025-01-11 RX ADMIN — INSULIN ASPART 2 UNITS: 100 INJECTION, SOLUTION INTRAVENOUS; SUBCUTANEOUS at 07:38

## 2025-01-11 RX ADMIN — CHLORHEXIDINE GLUCONATE 15 ML: 1.2 SOLUTION ORAL at 07:26

## 2025-01-11 RX ADMIN — FUROSEMIDE 60 MG: 10 INJECTION, SOLUTION INTRAMUSCULAR; INTRAVENOUS at 07:27

## 2025-01-11 RX ADMIN — Medication 1250 MG: at 07:30

## 2025-01-11 RX ADMIN — HYDROXYZINE HYDROCHLORIDE 50 MG: 50 TABLET ORAL at 19:59

## 2025-01-11 RX ADMIN — INSULIN ASPART 2 UNITS: 100 INJECTION, SOLUTION INTRAVENOUS; SUBCUTANEOUS at 04:24

## 2025-01-11 RX ADMIN — BACLOFEN 10 MG: 10 TABLET ORAL at 15:52

## 2025-01-11 RX ADMIN — BACLOFEN 10 MG: 10 TABLET ORAL at 07:28

## 2025-01-11 RX ADMIN — ACETYLCYSTEINE 2 ML: 200 SOLUTION ORAL; RESPIRATORY (INHALATION) at 20:09

## 2025-01-11 RX ADMIN — PIPERACILLIN AND TAZOBACTAM 4.5 G: 4; .5 INJECTION, POWDER, FOR SOLUTION INTRAVENOUS at 04:21

## 2025-01-11 RX ADMIN — POTASSIUM CHLORIDE 40 MEQ: 1.5 POWDER, FOR SOLUTION ORAL at 07:27

## 2025-01-11 RX ADMIN — Medication 1250 MG: at 20:10

## 2025-01-11 RX ADMIN — PANTOPRAZOLE SODIUM 40 MG: 40 INJECTION, POWDER, FOR SOLUTION INTRAVENOUS at 07:27

## 2025-01-11 RX ADMIN — INSULIN ASPART 2 UNITS: 100 INJECTION, SOLUTION INTRAVENOUS; SUBCUTANEOUS at 11:24

## 2025-01-11 RX ADMIN — Medication 60 ML: at 19:59

## 2025-01-11 RX ADMIN — INSULIN GLARGINE 20 UNITS: 100 INJECTION, SOLUTION SUBCUTANEOUS at 07:39

## 2025-01-11 RX ADMIN — INSULIN ASPART 2 UNITS: 100 INJECTION, SOLUTION INTRAVENOUS; SUBCUTANEOUS at 20:33

## 2025-01-11 RX ADMIN — Medication 60 ML: at 07:29

## 2025-01-11 RX ADMIN — HYDRALAZINE HYDROCHLORIDE 10 MG: 20 INJECTION INTRAMUSCULAR; INTRAVENOUS at 18:09

## 2025-01-11 RX ADMIN — CALCIUM CARBONATE (ANTACID) CHEW TAB 500 MG 1000 MG: 500 CHEW TAB at 07:26

## 2025-01-11 RX ADMIN — PIPERACILLIN AND TAZOBACTAM 4.5 G: 4; .5 INJECTION, POWDER, FOR SOLUTION INTRAVENOUS at 18:33

## 2025-01-11 ASSESSMENT — ACTIVITIES OF DAILY LIVING (ADL)
ADLS_ACUITY_SCORE: 61
ADLS_ACUITY_SCORE: 59
ADLS_ACUITY_SCORE: 61
ADLS_ACUITY_SCORE: 59
ADLS_ACUITY_SCORE: 59
ADLS_ACUITY_SCORE: 61

## 2025-01-11 NOTE — PROGRESS NOTES
[facility]    ICU Progress Note       Date of Admission:  12/31/2024    Assessment: Critical Care   Erwin Aguilar is a 64 year old male with a past medical history of DM2, HTN, PVD, and neuropathic pain who initially presented on 12/31/24 to the ED with arm pain, vomiting, and diarrhea; ACS and ST depression. He subsequently had VT arrest with 1 round of CPR, epinephrine, defibrillation. After ROSC had afib with RVR. Became hypotensive and was subsequently cannulated for VA ECMO on 1/2/25, s/p CABG x4, and arrived to the ICU at that time. He was de cannulated on 1/3/25 at bedside. ICU course complicated by large R ptx seen on chest CT on 1/4/25, CVTS Fellow placed chest tube at bedside with improvement of pneumothorax.       MAJOR CHANGES/UPDATES  Extubated   Discontinue   Plan to remove remaining pleural chest tubes   60 mg lasix, 5 mg metolazone, + 40 meq K+   Discontinue FWF   Start BB 12.5 mg BID    Plan: Critical Care     PLAN:  Neuro/ pain/ sedation:  # Acute postoperative pain  # Chronic pain with opioid dependence  # Neuropathy, PTA  # Sedation while on mechanical ventilation   # Agitation  # Delrium   Analgesia   - Scheduled: baclofen    - PRN: oxycodone,       # Generalized Anxiety, PTA   - Continue PTA Sertraline  - Restart PTA PRN hydroxyzine      # Encephalopathy of unclear etiology   - Prior to procedure  - 1/1 vEEG without seizure activity (severe encephalopathy) and 12/31, 1/4, and 1/7 CTH negative for acute findings.     Pulmonary care:   # Acute Hypoxic Respiratory failure, improved   # Respiratory alkalosis 2:2 agitation - improved   # R tension pneumothorax, resolved   # Aspiration Pneumonia, Klebsiella  - ON of 1/4 CT C/A/P ordered for persistently elevated lactate which revealed large R tension pneumothorax. CVTS notified and fellow placed chest tube at bedside this morning with improvement of pneumothorax.   - Extubated   - Q2h nebs for secretion  management   - Agressive pulmonary hygiene   - Removed last remain pleural tube   - Goal SpO2 > 92%      Cardiovascular:    # Mixed Shock, cardiogenic, vasoplegic  # HTN, PTA   # VT Cardiac Arrest on VA ECMO (01/01/24 - 1/3)  # CAD s/p CABG x4  # Dyslipidemia, PTA   # Cardiomyopathy   1/5 TTE Normal LVEF 55-60%, normal RV function   - Monitor hemodynamics closely. Goal MAP >65, SBP <140   - Aspirin 81mg daily   - Crestor 40mg daily  - Increase metoprolol 12.5 BID  - Hold PTA apixaban, Dyazide    GI care / Nutrition:   # At risk for protein calorie malnutrition   # Liquid Stool   - Diet: TF @ goal via NJ   - SLP consult, cleared for full liquid diet with 1:1 supervision   - PPI  - Last BM: 1/11/25    - C. Diff negative     Renal / Fluids / Electrolytes:   # Acute Kidney Injury 2/2 ATN - improving   # Hypokalemia   # Hypernatremia, resolved   # Hypervolemia  # urinary retention  BL creat appears to be ~ 0.6-0.8, creatinine today 0.63   - Strict I/O, daily weights, avoid/limit nephrotoxins  - Replete lytes PRN per protocol  - Can keep ca today   - discontinue FWF  - 5 mg metolazone and 60 mg lasix  - Repeat BMP @ 1600       Endocrine:    # Stress hyperglycemia  # Type 2 Diabetes Mellitus, PTA   - Preop A1c 5.7  - Goal BG <180 for optimal healing  - Lantus 20 units daily (PTA 26 units daily), sliding scale insulin     ID / Antibiotics:  # Stress induced leukocytosis  # Aspiration Pneumonia, Klebsiella  # febrile   # Sternal Wound Infection   Broadened abx from CTX (12/31 to 1/4) to zosyn 1/4-1/6 narrowed to CTX 1/7  CTX discontinued on 1/8, restarted zosyn 1/9/25, added vancomycin 1/9/25  Monitor fever curve, WBC, and inflammatory markers as appropriate  Monitor Bcx: new cultures sent today  Noted to have pustulant drainage from sternotomy  Updated Dr. Rosenbaum   Sent cultures  Continue zosyn and vancomycin   Plan CT with contrast 1/12/25     Cultures  1/8 G+ cocci in clusters in 1/4 cultures  1/4 UA non-infectious  appearing negative LE, nitrites, WBCs, and bacteria   1/4 Sputum cx gram stain 1+ Klebsiella pneumoniae   1/3 blood cx NGTD @ 2 day   12/31 sputum: Klebsiella pneumonia with resistance to ampicillin   12/31 MRSA and blood cultures negative     Heme:     # Acute blood loss anemia  # Acute thrombocytopenia  No s/sx active bleeding  - Daily CBC   - Hgb 7.5 today, stable   - Hgb goal > 7.0, transfuse PRN   - DVT ppx: SQH      MSK / Skin:  # Sternotomy   # Surgical Incision  - Sternal precautions, postop incision management protocol  - PT/OT/CR  - RUE US negative for DVT  - check bilateral LE US to rule out DVT as fever cause    #PVD, PTA   - S/p right BKA, 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         [facility]  Securely message with [employer] (more info)  Text page via [employer] Paging/Directory     Clinically Significant Risk Factors        # Hypokalemia: Lowest K = 2.9 mmol/L in last 2 days, will replace as needed   # Hyperchloremia: Highest Cl = 113 mmol/L in last 2 days, will monitor as appropriate          # Hypoalbuminemia: Lowest albumin = 2 g/dL at 1/2/2025  5:22 PM, will monitor as appropriate       # Hypertension: Noted on problem list     # Acute Hypoxic Respiratory Failure: Documented O2 saturation < 90%. Continue supplemental oxygen as needed  # Acute Hypercapnic Respiratory Failure: based on arterial blood gas results.  Continue supplemental oxygen and ventilatory support as indicated.  # Acute Hypercapnic Respiratory Failure: based on venous blood gas results.  Continue supplemental oxygen and ventilatory support as indicated.         # Overweight: Estimated body mass  "index is 27.33 kg/m  as calculated from the following:    Height as of this encounter: 1.803 m (5' 11\").    Weight as of this encounter: 88.9 kg (195 lb 15.8 oz).   # Severe Malnutrition: based on nutrition assessment    # Financial/Environmental Concerns: none  # Asthma: noted on problem list    # History of CABG: noted on surgical history       ____________________________________________________________________    Interval History   Extubated this AM. Pain well controlled. Agreeable to aggressive pulmonary hygiene.     Intake/Output  I/O last 3 completed shifts:  In: 5369.61 [I.V.:1359.61; NG/GT:2710]  Out: 5100 [Urine:4285; Stool:625; Chest Tube:190]      Physical Exam   Vital Signs: Temp: 99.7  F (37.6  C) Temp src: Axillary BP: 97/56 Pulse: 77   Resp: 16 SpO2: 95 % O2 Device: Mechanical Ventilator    Weight: 195 lbs 15.82 oz    Neuro: Alert to self and time.   HEENT: Normocephalic, atraumatic. PERRL, and nonicteric.   CV: RRR on monitor, S1S2, all extremities well perfused   Respiratory: scattered crackles. Wet cough, otherwise no respiratory distress.   GI: Abdomen soft. Non-distended. NJ in situ  : Rodriguez, edematous scrotum.   MSK: Right BKA. Moves all extremities well with decreased upper limb strength. Sensation intact in all upper/lower extremities. Right UE swelling and subcutaneous emphysema.   Skin: Sternal incision open to air, no surrounding erythema or induration noted. Pustulant drainage noted.   Large hematoma on L groin near previous ECMO cannulation site.     Data   I reviewed all medications, new labs and imaging results over the last 24 hours.  Arterial Blood Gases   Recent Labs   Lab 01/06/25  0323 01/06/25  0014 01/05/25  1953 01/05/25  1423   PH 7.47* 7.49* 7.51* 7.54*   PCO2 38 37 35 32*   PO2 141* 114* 144* 109*   HCO3 28 28 28 27       Complete Blood Count   Recent Labs   Lab 01/11/25  0412 01/10/25  0431 01/09/25  1619 01/09/25  0408   WBC 10.2 13.2* 11.3* 9.5   HGB 7.5* 7.4* 7.9* 7.7* "    276 279 209       Basic Metabolic Panel  Recent Labs   Lab 01/11/25  0424 01/11/25  0412 01/10/25  2348 01/10/25  2043 01/10/25  1929 01/10/25  1630 01/10/25  1418 01/10/25  0439 01/10/25  0431 01/09/25 2012 01/09/25  1959   NA  --  141  --   --   --   --  142  --  145  --  145   POTASSIUM  --  2.9*  2.9*  --  3.0*  --   --  3.4  3.4  --  3.4  --  3.9   CHLORIDE  --  110*  --   --   --   --  111*  --  112*  --  113*   CO2  --  22  --   --   --   --  22  --  23  --  23   BUN  --  19.0  --   --   --   --  19.5  --  18.3  --  18.4   CR  --  0.63*  --   --   --   --  0.63*  --  0.73  --  0.70   * 176* 156*  --  161*   < > 183*   < > 166*   < > 156*    < > = values in this interval not displayed.       Liver Function Tests  No lab results found in last 7 days.      Pancreatic Enzymes  No lab results found in last 7 days.      Coagulation Profile  No lab results found in last 7 days.      IMAGING:  Recent Results (from the past 24 hours)   US Lower Extremity Venous Duplex Bilateral    Narrative    EXAMINATION: DOPPLER VENOUS ULTRASOUND OF BILATERAL LOWER EXTREMITIES,  1/10/2025 2:00 PM     COMPARISON: Ultrasound 5/7/2024.    HISTORY: Assess for DVT    TECHNIQUE:  Gray-scale evaluation with compression, spectral flow and  color Doppler assessment of the deep venous system of both legs from  groin to knee, and then at the ankles.    FINDINGS:  In both lower extremities, the common femoral, femoral, and popliteal  veins demonstrate normal compressibility and blood flow. The left  posterior tibial vein is fully compressible.      Impression    IMPRESSION:  No evidence of deep venous thrombosis in either lower extremity.    I have personally reviewed the examination and initial interpretation  and I agree with the findings.    ACACIA SIM MD         SYSTEM ID:  [id number]   XR Chest Port 1 View    Impression    RESIDENT PRELIMINARY INTERPRETATION  Impression:  1. No appreciable pneumothorax. Stable  right apical chest tube  positioning.  2. Decreased hazy opacification of the right upper lung, favor  atelectasis.  3. Similar streaky perihilar and bibasilar opacities, favor  atelectasis and/or pulmonary edema.  4. Similar extensive subcutaneous emphysema along the right chest wall  and right neck.  5. Possible small left pleural effusion.

## 2025-01-11 NOTE — PHARMACY-VANCOMYCIN DOSING SERVICE
Drug Level Evaluation  Patient is currently receiving Vancomycin .  A level was drawn at 0856 on 1/11/25. The measured level result is 34.7 mcg/L  This medication level is invalid because it was drawn while medication infusing (or immediately after). No further assessment can be made at this time.  Continue current regimen.  Recheck level Today prior to next dose.  Pharmacy team will continue to follow.    Brenda Khan RPH on 1/11/2025 at 12:58 PM

## 2025-01-11 NOTE — PLAN OF CARE
Major Shift Events: Remains intubated  Neuro: RASS 0. Follows commands, tracks. OGLESBY, tmax 38.4  CV: SR 70-80s.  Pulmonary: PC-PSV with minimal secretions   FiO2 (%): 30 %, Resp: 14, Inspiratory Pressure (cm H2O) (Drager Sonam): 18, Vent Mode: PCV PLUS, Resp Rate (Set): 14 breaths/min, Tidal Volume (Set, mL): 420 mL, PEEP (cm H2O): 5 cmH2O, Pressure Support (cm H2O): 7 cmH2O, Resp Rate (Set): 14 breaths/min, Tidal Volume (Set, mL): 420 mL, PEEP (cm H2O): 5 cmH2O   GI/: ca with adequate output. NJ with tube feeds. Rectal tube.   Intake/Output Summary (Last 24 hours) at 1/11/2025 0512  Last data filed at 1/11/2025 0500  Gross per 24 hour   Intake 5317.91 ml   Output 5535 ml   Net -217.09 ml      Plan: PST and extubate when appropriate  I assumed care of this patient on 1/10/25 at 1930. See flowsheet for full assessment and medication titrations.     Dulce Loza RN, BAN, CCRN on 1/11/2025 at 5:12 AM  Problem: Mechanical Ventilation Invasive  Goal: Optimal Device Function  Outcome: Progressing  Intervention: Optimize Device Care and Function  Recent Flowsheet Documentation  Taken 1/11/2025 0400 by Dulce Montes RN  Airway Safety Measures:   all equipment/monitors on and audible   mask at bedside   manual resuscitator/mask/valve at bedside   manual resuscitator/mask/valve in room   oxygen flowmeter   suction at bedside   suction equipment   suction regulator   high-efficiency antimicrobial filters maintained  Airway/Ventilation Management:   airway patency maintained   position adjusted   pulmonary hygiene promoted  Oral Care:   tongue brushed   teeth brushed   suction provided   oral rinse provided  Taken 1/11/2025 0000 by Dulce Montes RN  Airway Safety Measures:   all equipment/monitors on and audible   mask at bedside   manual resuscitator/mask/valve at bedside   manual resuscitator/mask/valve in room   oxygen flowmeter   suction at bedside   suction equipment   suction regulator    high-efficiency antimicrobial filters maintained  Airway/Ventilation Management:   airway patency maintained   position adjusted   pulmonary hygiene promoted  Oral Care:   tongue brushed   teeth brushed   suction provided   oral rinse provided  Taken 1/10/2025 2000 by Dulce Montes RN  Airway Safety Measures:   all equipment/monitors on and audible   mask at bedside   manual resuscitator/mask/valve at bedside   manual resuscitator/mask/valve in room   oxygen flowmeter   suction at bedside   suction equipment   suction regulator   high-efficiency antimicrobial filters maintained  Airway/Ventilation Management:   airway patency maintained   position adjusted   pulmonary hygiene promoted  Oral Care:   tongue brushed   teeth brushed   suction provided   oral rinse provided   Goal Outcome Evaluation:      Plan of Care Reviewed With: patient    Overall Patient Progress: no changeOverall Patient Progress: no change    Outcome Evaluation: remains intubated

## 2025-01-11 NOTE — PROGRESS NOTES
[facility], Procedure Note          Extubation:       Erwin Aguilar  MRN# [id number]   January 11, 2025, 9:19 AM         Patient extubated at: January 11, 2025, 9:02 AM   Supplemental Oxygen: Via face mask at 3 liters per minute   Cough: The cough is strong, good, and productive   Secretion Mode: Able to clear   Secretion Amount: Moderate amount, moderately thick and cloudy white in color   Respiratory Exam:: Breath sounds: equal and clear     Location: bilaterally   Skin Exam:: Patient color: natural   Patient Status: Currently appears comfortable   Arterial Blood Gasses: pH Arterial (no units)   Date Value   01/06/2025 7.47 (H)     pO2 Arterial (mm Hg)   Date Value   01/06/2025 141 (H)     pCO2 Arterial (mm Hg)   Date Value   01/06/2025 38     Bicarbonate Arterial (mmol/L)   Date Value   01/06/2025 28            Recorded by Ana María Martines RT

## 2025-01-11 NOTE — PROGRESS NOTES
Right pleural CT removed. CXR review prior and no evidence of effusion or pneumothorax. No air leak on exam. Rhythm stable in NSR with HR in the 80s. Hemoglobin and platelets stable. No signs of bleeding/ or change in status following removal, VSS. Post-removal CXR ordered.     Antonio Hernandez DNP APRN CNP CCRN   Cardiovascular Surgery   Available on [employer] or Secure Chat   Pager [phone]

## 2025-01-11 NOTE — PROGRESS NOTES
01/11/25 1057   Appointment Info   Signing Clinician's Name / Credentials (SLP) Maria Luz Garcia MA CCC-SLP   General Information   Onset of Illness/Injury or Date of Surgery 12/31/24   Referring Physician Renata Rey MD   Patient/Family Therapy Goal Statement (SLP) None stated   Pertinent History of Current Problem Pt is a 64 year old male with a past medical history of DM2, HTN, PVD, and neuropathic pain who initially presented on 12/31/24 to the ED with arm pain, vomiting, and diarrhea; ACS and ST depression. He subsequently had VT arrest with 1 round of CPR, epinephrine, defibrillation. After ROSC had afib with RVR. Became hypotensive and was subsequently cannulated for VA ECMO on 1/2/25, s/p CABG x4, and arrived to the ICU at that time. He was de cannulated on 1/3/25 at bedside. Extubated this AM. Clinical swallow eval completed per MD order.   General Observations Alert, participatory   Type of Evaluation   Type of Evaluation Swallow Evaluation   Oral Motor   Oral Musculature generally intact   Structural Abnormalities none present   Mucosal Quality dry   Dentition (Oral Motor)   Comment, Dentition (Oral Motor) has upper and lower dentures but they are not present at the hospital   Dentition (Oral Motor) edentulous   Facial Symmetry (Oral Motor)   Facial Symmetry (Oral Motor) WNL   Lip Function (Oral Motor)   Lip Range of Motion (Oral Motor) WNL   Tongue Function (Oral Motor)   Tongue ROM (Oral Motor) WNL   Jaw Function (Oral Motor)   Jaw Function (Oral Motor) WNL   Cough/Swallow/Gag Reflex (Oral Motor)   Comment, Cough/Swallow/Gag Reflex (Oral Motor) adequate reflexive cough   Vocal Quality/Secretion Management (Oral Motor)   Vocal Quality (Oral Motor) WFL   Comment, Vocal Quality/Secretion Management (Oral Motor) reduced breath support   General Swallowing Observations   Past History of Dysphagia None   Respiratory Support room air   Current Diet/Method of Nutritional Intake (General Swallowing  Observations, NIS) NPO;nasogastric tube (NG)   Swallowing Evaluation Clinical swallow evaluation   Clinical Swallow Evaluation   Feeding Assistance dependent   Clinical Swallow Evaluation Textures Trialed thin liquids;pureed;solid foods   Clinical Swallow Eval: Thin Liquid Texture Trial   Mode of Presentation, Thin Liquids spoon;cup;straw;fed by clinician   Volume of Liquid or Food Presented 4oz thin water   Oral Phase of Swallow WFL   Pharyngeal Phase of Swallow intact   Diagnostic Statement No overt s/sx of aspiration   Clinical Swallow Evaluation: Puree Solid Texture Trial   Mode of Presentation, Puree spoon;fed by clinician   Volume of Puree Presented 2oz applesauce   Oral Phase, Puree WFL   Pharyngeal Phase, Puree intact   Diagnostic Statement No overt s/sx of aspiration   Clinical Swallow Evaluation: Solid Food Texture Trial   Diagnostic Statement no solids trialed d/t lack of dentition   Swallowing Recommendations   Diet Consistency Recommendations thin liquids (level 0);full liquid diet   Supervision Level for Intake 1:1 supervision needed   Monitoring/Assistance Required (Eating/Swallowing) stop eating activities when fatigue is present;monitor for cough or change in vocal quality with intake   Recommended Feeding/Eating Techniques (Swallow Eval) maintain upright sitting position for eating   Medication Administration Recommendations, Swallowing (SLP) as tolerated   Instrumental Assessment Recommendations reassess via non-instrumental clinical swallow evaluation   General Therapy Interventions   Planned Therapy Interventions Dysphagia Treatment   Dysphagia treatment Oropharyngeal exercise training;Modified diet education;Instruction of safe swallow strategies;Compensatory strategies for swallowing   Clinical Impression   Criteria for Skilled Therapeutic Interventions Met (SLP Eval) Yes, treatment indicated   SLP Diagnosis Potential dysphagia s/p extubuation, ECMO, cardiac surgery   Risks & Benefits of  therapy have been explained evaluation/treatment results reviewed;care plan/treatment goals reviewed;risks/benefits reviewed;current/potential barriers reviewed;participants voiced agreement with care plan;participants included;patient   Clinical Impression Comments   Clinical swallow eval completed per MD order. Pt at increased risk for dysphagia s/p extubation, ECMO, and cardiac surgery. [facility] for reduced breath support for speech, lack of dentition. Pt has dentures but they are not present at the hospital. Pt assessed with thin liquids and applesauce - solids deferred d/t lack of dentition. Swallow functional with thin liquids and puree, no overt s/sx of aspiration. Recommend full liquid diet, thin liquids OK, with 1:1 assist. Ensure pt is upright and alert for all PO. SLP will follow.     SLP Total Evaluation Time   Eval: oral/pharyngeal swallow function, clinical swallow Minutes (92243) 13   SLP Discharge Recommendation Transitional Care Facility   SLP Rationale for DC Rec modified diet   SLP Time and Intention   Total Session Time (sum of timed and untimed services) 18

## 2025-01-12 ENCOUNTER — APPOINTMENT (OUTPATIENT)
Dept: PHYSICAL THERAPY | Facility: CLINIC | Age: 65
DRG: 003 | End: 2025-01-12
Attending: PHYSICIAN ASSISTANT
Payer: MEDICARE

## 2025-01-12 ENCOUNTER — APPOINTMENT (OUTPATIENT)
Dept: GENERAL RADIOLOGY | Facility: CLINIC | Age: 65
DRG: 003 | End: 2025-01-12
Attending: STUDENT IN AN ORGANIZED HEALTH CARE EDUCATION/TRAINING PROGRAM
Payer: MEDICARE

## 2025-01-12 ENCOUNTER — APPOINTMENT (OUTPATIENT)
Dept: OCCUPATIONAL THERAPY | Facility: CLINIC | Age: 65
DRG: 003 | End: 2025-01-12
Payer: MEDICARE

## 2025-01-12 ENCOUNTER — APPOINTMENT (OUTPATIENT)
Dept: CT IMAGING | Facility: CLINIC | Age: 65
DRG: 003 | End: 2025-01-12
Attending: NURSE PRACTITIONER
Payer: MEDICARE

## 2025-01-12 LAB
ANION GAP SERPL CALCULATED.3IONS-SCNC: 10 MMOL/L (ref 7–15)
ANION GAP SERPL CALCULATED.3IONS-SCNC: 11 MMOL/L (ref 7–15)
ANION GAP SERPL CALCULATED.3IONS-SCNC: 12 MMOL/L (ref 7–15)
BACTERIA BLD CULT: ABNORMAL
BUN SERPL-MCNC: 18.2 MG/DL (ref 8–23)
BUN SERPL-MCNC: 18.7 MG/DL (ref 8–23)
BUN SERPL-MCNC: 21.2 MG/DL (ref 8–23)
CALCIUM SERPL-MCNC: 7.9 MG/DL (ref 8.8–10.4)
CALCIUM SERPL-MCNC: 7.9 MG/DL (ref 8.8–10.4)
CALCIUM SERPL-MCNC: 8.1 MG/DL (ref 8.8–10.4)
CHLORIDE SERPL-SCNC: 108 MMOL/L (ref 98–107)
CHLORIDE SERPL-SCNC: 109 MMOL/L (ref 98–107)
CHLORIDE SERPL-SCNC: 110 MMOL/L (ref 98–107)
CREAT SERPL-MCNC: 0.52 MG/DL (ref 0.67–1.17)
CREAT SERPL-MCNC: 0.57 MG/DL (ref 0.67–1.17)
CREAT SERPL-MCNC: 0.57 MG/DL (ref 0.67–1.17)
EGFRCR SERPLBLD CKD-EPI 2021: >90 ML/MIN/1.73M2
ERYTHROCYTE [DISTWIDTH] IN BLOOD BY AUTOMATED COUNT: 17.6 % (ref 10–15)
GLUCOSE BLDC GLUCOMTR-MCNC: 157 MG/DL (ref 70–99)
GLUCOSE BLDC GLUCOMTR-MCNC: 161 MG/DL (ref 70–99)
GLUCOSE BLDC GLUCOMTR-MCNC: 164 MG/DL (ref 70–99)
GLUCOSE BLDC GLUCOMTR-MCNC: 170 MG/DL (ref 70–99)
GLUCOSE BLDC GLUCOMTR-MCNC: 174 MG/DL (ref 70–99)
GLUCOSE BLDC GLUCOMTR-MCNC: 181 MG/DL (ref 70–99)
GLUCOSE SERPL-MCNC: 154 MG/DL (ref 70–99)
GLUCOSE SERPL-MCNC: 172 MG/DL (ref 70–99)
GLUCOSE SERPL-MCNC: 176 MG/DL (ref 70–99)
HCO3 SERPL-SCNC: 22 MMOL/L (ref 22–29)
HCO3 SERPL-SCNC: 22 MMOL/L (ref 22–29)
HCO3 SERPL-SCNC: 23 MMOL/L (ref 22–29)
HCT VFR BLD AUTO: 26.6 % (ref 40–53)
HGB BLD-MCNC: 8.6 G/DL (ref 13.3–17.7)
HOLD SPECIMEN: NORMAL
MAGNESIUM SERPL-MCNC: 2 MG/DL (ref 1.7–2.3)
MAGNESIUM SERPL-MCNC: 2 MG/DL (ref 1.7–2.3)
MCH RBC QN AUTO: 29.7 PG (ref 26.5–33)
MCHC RBC AUTO-ENTMCNC: 32.3 G/DL (ref 31.5–36.5)
MCV RBC AUTO: 92 FL (ref 78–100)
PHOSPHATE SERPL-MCNC: 1.9 MG/DL (ref 2.5–4.5)
PHOSPHATE SERPL-MCNC: 3.3 MG/DL (ref 2.5–4.5)
PLATELET # BLD AUTO: 394 10E3/UL (ref 150–450)
POTASSIUM SERPL-SCNC: 2.6 MMOL/L (ref 3.4–5.3)
POTASSIUM SERPL-SCNC: 2.8 MMOL/L (ref 3.4–5.3)
POTASSIUM SERPL-SCNC: 3 MMOL/L (ref 3.4–5.3)
POTASSIUM SERPL-SCNC: 3.1 MMOL/L (ref 3.4–5.3)
POTASSIUM SERPL-SCNC: 3.1 MMOL/L (ref 3.4–5.3)
POTASSIUM SERPL-SCNC: 3.8 MMOL/L (ref 3.4–5.3)
POTASSIUM SERPL-SCNC: 3.8 MMOL/L (ref 3.4–5.3)
RBC # BLD AUTO: 2.9 10E6/UL (ref 4.4–5.9)
SODIUM SERPL-SCNC: 142 MMOL/L (ref 135–145)
SODIUM SERPL-SCNC: 142 MMOL/L (ref 135–145)
SODIUM SERPL-SCNC: 143 MMOL/L (ref 135–145)
WBC # BLD AUTO: 12.9 10E3/UL (ref 4–11)

## 2025-01-12 PROCEDURE — 71260 CT THORAX DX C+: CPT

## 2025-01-12 PROCEDURE — 99233 SBSQ HOSP IP/OBS HIGH 50: CPT | Mod: 24 | Performed by: STUDENT IN AN ORGANIZED HEALTH CARE EDUCATION/TRAINING PROGRAM

## 2025-01-12 PROCEDURE — 71045 X-RAY EXAM CHEST 1 VIEW: CPT | Mod: 26 | Performed by: STUDENT IN AN ORGANIZED HEALTH CARE EDUCATION/TRAINING PROGRAM

## 2025-01-12 PROCEDURE — 97535 SELF CARE MNGMENT TRAINING: CPT | Mod: GO

## 2025-01-12 PROCEDURE — 84132 ASSAY OF SERUM POTASSIUM: CPT | Performed by: STUDENT IN AN ORGANIZED HEALTH CARE EDUCATION/TRAINING PROGRAM

## 2025-01-12 PROCEDURE — 250N000009 HC RX 250: Performed by: NURSE PRACTITIONER

## 2025-01-12 PROCEDURE — 250N000011 HC RX IP 250 OP 636

## 2025-01-12 PROCEDURE — 94640 AIRWAY INHALATION TREATMENT: CPT

## 2025-01-12 PROCEDURE — 250N000011 HC RX IP 250 OP 636: Performed by: NURSE PRACTITIONER

## 2025-01-12 PROCEDURE — 84132 ASSAY OF SERUM POTASSIUM: CPT | Performed by: NURSE PRACTITIONER

## 2025-01-12 PROCEDURE — 250N000013 HC RX MED GY IP 250 OP 250 PS 637

## 2025-01-12 PROCEDURE — 85014 HEMATOCRIT: CPT

## 2025-01-12 PROCEDURE — 83735 ASSAY OF MAGNESIUM: CPT | Performed by: STUDENT IN AN ORGANIZED HEALTH CARE EDUCATION/TRAINING PROGRAM

## 2025-01-12 PROCEDURE — 80048 BASIC METABOLIC PNL TOTAL CA: CPT | Performed by: STUDENT IN AN ORGANIZED HEALTH CARE EDUCATION/TRAINING PROGRAM

## 2025-01-12 PROCEDURE — 250N000013 HC RX MED GY IP 250 OP 250 PS 637: Performed by: NURSE PRACTITIONER

## 2025-01-12 PROCEDURE — 84100 ASSAY OF PHOSPHORUS: CPT | Performed by: STUDENT IN AN ORGANIZED HEALTH CARE EDUCATION/TRAINING PROGRAM

## 2025-01-12 PROCEDURE — 250N000013 HC RX MED GY IP 250 OP 250 PS 637: Performed by: PHYSICIAN ASSISTANT

## 2025-01-12 PROCEDURE — 250N000009 HC RX 250: Performed by: STUDENT IN AN ORGANIZED HEALTH CARE EDUCATION/TRAINING PROGRAM

## 2025-01-12 PROCEDURE — 36415 COLL VENOUS BLD VENIPUNCTURE: CPT

## 2025-01-12 PROCEDURE — 97162 PT EVAL MOD COMPLEX 30 MIN: CPT | Mod: GP | Performed by: PHYSICAL THERAPIST

## 2025-01-12 PROCEDURE — 250N000011 HC RX IP 250 OP 636: Performed by: STUDENT IN AN ORGANIZED HEALTH CARE EDUCATION/TRAINING PROGRAM

## 2025-01-12 PROCEDURE — 71260 CT THORAX DX C+: CPT | Mod: 26 | Performed by: RADIOLOGY

## 2025-01-12 PROCEDURE — 36415 COLL VENOUS BLD VENIPUNCTURE: CPT | Performed by: NURSE PRACTITIONER

## 2025-01-12 PROCEDURE — 80048 BASIC METABOLIC PNL TOTAL CA: CPT

## 2025-01-12 PROCEDURE — 71045 X-RAY EXAM CHEST 1 VIEW: CPT

## 2025-01-12 PROCEDURE — 94640 AIRWAY INHALATION TREATMENT: CPT | Mod: 76

## 2025-01-12 PROCEDURE — 93005 ELECTROCARDIOGRAM TRACING: CPT

## 2025-01-12 PROCEDURE — 36415 COLL VENOUS BLD VENIPUNCTURE: CPT | Performed by: STUDENT IN AN ORGANIZED HEALTH CARE EDUCATION/TRAINING PROGRAM

## 2025-01-12 PROCEDURE — 999N000157 HC STATISTIC RCP TIME EA 10 MIN

## 2025-01-12 PROCEDURE — 120N000003 HC R&B IMCU UMMC

## 2025-01-12 PROCEDURE — 97530 THERAPEUTIC ACTIVITIES: CPT | Mod: GP | Performed by: PHYSICAL THERAPIST

## 2025-01-12 PROCEDURE — 97530 THERAPEUTIC ACTIVITIES: CPT | Mod: GO

## 2025-01-12 PROCEDURE — 250N000013 HC RX MED GY IP 250 OP 250 PS 637: Performed by: STUDENT IN AN ORGANIZED HEALTH CARE EDUCATION/TRAINING PROGRAM

## 2025-01-12 RX ORDER — MAGNESIUM SULFATE HEPTAHYDRATE 40 MG/ML
2 INJECTION, SOLUTION INTRAVENOUS ONCE
Status: COMPLETED | OUTPATIENT
Start: 2025-01-12 | End: 2025-01-12

## 2025-01-12 RX ORDER — POTASSIUM CHLORIDE 20MEQ/15ML
20 LIQUID (ML) ORAL ONCE
Status: COMPLETED | OUTPATIENT
Start: 2025-01-12 | End: 2025-01-12

## 2025-01-12 RX ORDER — POTASSIUM CHLORIDE 7.45 MG/ML
10 INJECTION INTRAVENOUS
Status: COMPLETED | OUTPATIENT
Start: 2025-01-12 | End: 2025-01-12

## 2025-01-12 RX ORDER — METOLAZONE 5 MG/1
5 TABLET ORAL ONCE
Status: COMPLETED | OUTPATIENT
Start: 2025-01-12 | End: 2025-01-12

## 2025-01-12 RX ORDER — POTASSIUM CHLORIDE 20MEQ/15ML
40 LIQUID (ML) ORAL ONCE
Status: COMPLETED | OUTPATIENT
Start: 2025-01-12 | End: 2025-01-12

## 2025-01-12 RX ORDER — LOPERAMIDE HCL 1 MG/7.5ML
2 SOLUTION ORAL EVERY 6 HOURS
Status: COMPLETED | OUTPATIENT
Start: 2025-01-12 | End: 2025-01-12

## 2025-01-12 RX ORDER — IOPAMIDOL 755 MG/ML
95 INJECTION, SOLUTION INTRAVASCULAR ONCE
Status: COMPLETED | OUTPATIENT
Start: 2025-01-12 | End: 2025-01-12

## 2025-01-12 RX ORDER — POTASSIUM CHLORIDE 1.5 G/1.58G
60 POWDER, FOR SOLUTION ORAL ONCE
Status: COMPLETED | OUTPATIENT
Start: 2025-01-12 | End: 2025-01-12

## 2025-01-12 RX ORDER — SODIUM CHLORIDE AND POTASSIUM CHLORIDE 150; 900 MG/100ML; MG/100ML
INJECTION, SOLUTION INTRAVENOUS ONCE
Status: DISCONTINUED | OUTPATIENT
Start: 2025-01-12 | End: 2025-01-12

## 2025-01-12 RX ORDER — POTASSIUM CHLORIDE 1.5 G/1.58G
20 POWDER, FOR SOLUTION ORAL ONCE
Status: COMPLETED | OUTPATIENT
Start: 2025-01-12 | End: 2025-01-12

## 2025-01-12 RX ORDER — METOPROLOL TARTRATE 25 MG/1
25 TABLET, FILM COATED ORAL 2 TIMES DAILY
Status: DISCONTINUED | OUTPATIENT
Start: 2025-01-12 | End: 2025-01-14

## 2025-01-12 RX ORDER — MAGNESIUM SULFATE HEPTAHYDRATE 40 MG/ML
2 INJECTION, SOLUTION INTRAVENOUS ONCE
Status: COMPLETED | OUTPATIENT
Start: 2025-01-12 | End: 2025-01-13

## 2025-01-12 RX ORDER — POTASSIUM CHLORIDE 1.5 G/1.58G
40 POWDER, FOR SOLUTION ORAL ONCE
Status: COMPLETED | OUTPATIENT
Start: 2025-01-12 | End: 2025-01-12

## 2025-01-12 RX ORDER — FUROSEMIDE 10 MG/ML
60 INJECTION INTRAMUSCULAR; INTRAVENOUS ONCE
Status: COMPLETED | OUTPATIENT
Start: 2025-01-12 | End: 2025-01-12

## 2025-01-12 RX ADMIN — METOPROLOL TARTRATE 25 MG: 25 TABLET, FILM COATED ORAL at 07:35

## 2025-01-12 RX ADMIN — POTASSIUM CHLORIDE 40 MEQ: 20 SOLUTION ORAL at 15:20

## 2025-01-12 RX ADMIN — MAGNESIUM SULFATE HEPTAHYDRATE 2 G: 2 INJECTION, SOLUTION INTRAVENOUS at 23:39

## 2025-01-12 RX ADMIN — LOPERAMIDE HCL 2 MG: 1 SOLUTION ORAL at 10:17

## 2025-01-12 RX ADMIN — POTASSIUM & SODIUM PHOSPHATES POWDER PACK 280-160-250 MG 2 PACKET: 280-160-250 PACK at 07:44

## 2025-01-12 RX ADMIN — LOPERAMIDE HCL 2 MG: 1 SOLUTION ORAL at 13:42

## 2025-01-12 RX ADMIN — Medication 60 ML: at 19:48

## 2025-01-12 RX ADMIN — SODIUM CHLORIDE, PRESERVATIVE FREE 82 ML: 5 INJECTION INTRAVENOUS at 08:35

## 2025-01-12 RX ADMIN — BACLOFEN 10 MG: 10 TABLET ORAL at 23:58

## 2025-01-12 RX ADMIN — BACLOFEN 10 MG: 10 TABLET ORAL at 15:20

## 2025-01-12 RX ADMIN — INSULIN ASPART 2 UNITS: 100 INJECTION, SOLUTION INTRAVENOUS; SUBCUTANEOUS at 08:19

## 2025-01-12 RX ADMIN — HEPARIN SODIUM 5000 UNITS: 5000 INJECTION, SOLUTION INTRAVENOUS; SUBCUTANEOUS at 15:20

## 2025-01-12 RX ADMIN — POTASSIUM CHLORIDE 40 MEQ: 1.5 POWDER, FOR SOLUTION ORAL at 10:16

## 2025-01-12 RX ADMIN — POTASSIUM CHLORIDE 60 MEQ: 1.5 POWDER, FOR SOLUTION ORAL at 07:35

## 2025-01-12 RX ADMIN — METOPROLOL TARTRATE 25 MG: 25 TABLET, FILM COATED ORAL at 19:48

## 2025-01-12 RX ADMIN — MAGNESIUM SULFATE HEPTAHYDRATE 2 G: 2 INJECTION, SOLUTION INTRAVENOUS at 07:36

## 2025-01-12 RX ADMIN — HEPARIN SODIUM 5000 UNITS: 5000 INJECTION, SOLUTION INTRAVENOUS; SUBCUTANEOUS at 07:41

## 2025-01-12 RX ADMIN — PIPERACILLIN AND TAZOBACTAM 4.5 G: 4; .5 INJECTION, POWDER, FOR SOLUTION INTRAVENOUS at 06:18

## 2025-01-12 RX ADMIN — Medication 60 ML: at 07:41

## 2025-01-12 RX ADMIN — HYDROXYZINE HYDROCHLORIDE 50 MG: 50 TABLET ORAL at 19:48

## 2025-01-12 RX ADMIN — IPRATROPIUM BROMIDE AND ALBUTEROL SULFATE 3 ML: .5; 3 SOLUTION RESPIRATORY (INHALATION) at 19:53

## 2025-01-12 RX ADMIN — INSULIN ASPART 1 UNITS: 100 INJECTION, SOLUTION INTRAVENOUS; SUBCUTANEOUS at 11:54

## 2025-01-12 RX ADMIN — POTASSIUM CHLORIDE 40 MEQ: 20 SOLUTION ORAL at 01:37

## 2025-01-12 RX ADMIN — LOPERAMIDE HCL 2 MG: 1 SOLUTION ORAL at 18:38

## 2025-01-12 RX ADMIN — INSULIN ASPART 2 UNITS: 100 INJECTION, SOLUTION INTRAVENOUS; SUBCUTANEOUS at 00:52

## 2025-01-12 RX ADMIN — Medication 5 MG: at 18:38

## 2025-01-12 RX ADMIN — POTASSIUM & SODIUM PHOSPHATES POWDER PACK 280-160-250 MG 2 PACKET: 280-160-250 PACK at 15:20

## 2025-01-12 RX ADMIN — PIPERACILLIN AND TAZOBACTAM 4.5 G: 4; .5 INJECTION, POWDER, FOR SOLUTION INTRAVENOUS at 00:51

## 2025-01-12 RX ADMIN — POTASSIUM CHLORIDE 20 MEQ: 1.5 POWDER, FOR SOLUTION ORAL at 10:16

## 2025-01-12 RX ADMIN — ACETAMINOPHEN 650 MG: 325 TABLET, FILM COATED ORAL at 19:48

## 2025-01-12 RX ADMIN — POTASSIUM & SODIUM PHOSPHATES POWDER PACK 280-160-250 MG 2 PACKET: 280-160-250 PACK at 11:54

## 2025-01-12 RX ADMIN — HEPARIN SODIUM 5000 UNITS: 5000 INJECTION, SOLUTION INTRAVENOUS; SUBCUTANEOUS at 00:51

## 2025-01-12 RX ADMIN — BACLOFEN 10 MG: 10 TABLET ORAL at 00:51

## 2025-01-12 RX ADMIN — POTASSIUM CHLORIDE 10 MEQ: 7.46 INJECTION, SOLUTION INTRAVENOUS at 15:19

## 2025-01-12 RX ADMIN — POTASSIUM CHLORIDE 20 MEQ: 20 SOLUTION ORAL at 03:30

## 2025-01-12 RX ADMIN — Medication 1250 MG: at 08:01

## 2025-01-12 RX ADMIN — ASPIRIN 81 MG CHEWABLE TABLET 162 MG: 81 TABLET CHEWABLE at 07:36

## 2025-01-12 RX ADMIN — INSULIN ASPART 2 UNITS: 100 INJECTION, SOLUTION INTRAVENOUS; SUBCUTANEOUS at 05:17

## 2025-01-12 RX ADMIN — ACETAMINOPHEN 650 MG: 325 TABLET, FILM COATED ORAL at 15:20

## 2025-01-12 RX ADMIN — POTASSIUM CHLORIDE 20 MEQ: 20 SOLUTION ORAL at 17:24

## 2025-01-12 RX ADMIN — ACETYLCYSTEINE 2 ML: 200 SOLUTION ORAL; RESPIRATORY (INHALATION) at 19:53

## 2025-01-12 RX ADMIN — IOPAMIDOL 95 ML: 755 INJECTION, SOLUTION INTRAVENOUS at 08:35

## 2025-01-12 RX ADMIN — ROSUVASTATIN CALCIUM 40 MG: 20 TABLET, FILM COATED ORAL at 07:35

## 2025-01-12 RX ADMIN — BACLOFEN 10 MG: 10 TABLET ORAL at 07:35

## 2025-01-12 RX ADMIN — ACETYLCYSTEINE 2 ML: 200 SOLUTION ORAL; RESPIRATORY (INHALATION) at 09:07

## 2025-01-12 RX ADMIN — INSULIN ASPART 1 UNITS: 100 INJECTION, SOLUTION INTRAVENOUS; SUBCUTANEOUS at 16:10

## 2025-01-12 RX ADMIN — HEPARIN SODIUM 5000 UNITS: 5000 INJECTION, SOLUTION INTRAVENOUS; SUBCUTANEOUS at 23:45

## 2025-01-12 RX ADMIN — THERA TABS 1 TABLET: TAB at 07:35

## 2025-01-12 RX ADMIN — PIPERACILLIN AND TAZOBACTAM 4.5 G: 4; .5 INJECTION, POWDER, FOR SOLUTION INTRAVENOUS at 13:40

## 2025-01-12 RX ADMIN — FUROSEMIDE 60 MG: 10 INJECTION, SOLUTION INTRAMUSCULAR; INTRAVENOUS at 08:01

## 2025-01-12 RX ADMIN — POTASSIUM CHLORIDE 40 MEQ: 20 SOLUTION ORAL at 02:33

## 2025-01-12 RX ADMIN — IPRATROPIUM BROMIDE AND ALBUTEROL SULFATE 3 ML: .5; 3 SOLUTION RESPIRATORY (INHALATION) at 09:07

## 2025-01-12 RX ADMIN — METOLAZONE 5 MG: 5 TABLET ORAL at 07:40

## 2025-01-12 RX ADMIN — Medication 1250 MG: at 19:48

## 2025-01-12 RX ADMIN — PIPERACILLIN AND TAZOBACTAM 4.5 G: 4; .5 INJECTION, POWDER, FOR SOLUTION INTRAVENOUS at 18:38

## 2025-01-12 RX ADMIN — POTASSIUM CHLORIDE 40 MEQ: 20 SOLUTION ORAL at 23:58

## 2025-01-12 RX ADMIN — POTASSIUM CHLORIDE 10 MEQ: 7.46 INJECTION, SOLUTION INTRAVENOUS at 16:23

## 2025-01-12 RX ADMIN — SERTRALINE HYDROCHLORIDE 200 MG: 100 TABLET ORAL at 07:35

## 2025-01-12 ASSESSMENT — ACTIVITIES OF DAILY LIVING (ADL)
ADLS_ACUITY_SCORE: 59
ADLS_ACUITY_SCORE: 55
ADLS_ACUITY_SCORE: 59
ADLS_ACUITY_SCORE: 55
ADLS_ACUITY_SCORE: 59
ADLS_ACUITY_SCORE: 55
ADLS_ACUITY_SCORE: 55
ADLS_ACUITY_SCORE: 59
ADLS_ACUITY_SCORE: 55
ADLS_ACUITY_SCORE: 59
ADLS_ACUITY_SCORE: 55
ADLS_ACUITY_SCORE: 55
ADLS_ACUITY_SCORE: 59
ADLS_ACUITY_SCORE: 55
ADLS_ACUITY_SCORE: 59

## 2025-01-12 NOTE — PROGRESS NOTES
CVICU Progress Note  01/12/2025         Assessment and Plan:     Erwin Aguilar is a 64 year old male with a past medical history of DM2, HTN, PVD, and neuropathic pain who initially presented on 12/31/24 to the ED with arm pain, vomiting, and diarrhea; ACS and ST depression. He subsequently had VT arrest with 1 round of CPR, epinephrine, defibrillation. After ROSC was found to be in afib with RVR. Became hypotensive and was subsequently cannulated for VA ECMO. Found to have multivessel disease s/p CABG x4 on 1/2/25 with Dr. Rosenbaum. ECMO de cannulated on 1/3/25 at bedside. ICU course complicated by large R ptx seen on chest CT on 1/4/25 s/p bedside CT placement. Extubated 1/11/25.       Cardiovascular:   Hx of CAD - s/p CABG x4 with Dr. Rosenbaum   Mixed Shock, cardiogenic, vasoplegic, resolved   HTN  Pre-op VT Cardiac Arrest on VA ECMO (01/01/24 - 1/3)  HLD  Pro-longed QTc  No arrhythmias overnight, HD stable, in NSR.   Post operative ECHO 1/5 with LVEF 55-60%, normal RV function, no pericardial effusion   -  mg daily  - Rosuvastatin 40 mg daily   - Increase Metoprolol 25 mg BID   - Diuresis as below  - QTc 536. Will discontinue trazodone, repeat EKG tomorrow     Chest tubes: Removed in ICU   TPW: Removed in ICU     Pulmonary:  Acute Hypoxic Respiratory failure, resolved   Respiratory alkalosis 2:2 agitation - resolved   R tension pneumothorax, resolved   VAP  SubQ emphysema   Extubated POD 9 to 4 lpm via NC. Now saturating well on RA.  - Supplemental O2 PRN to keep sats > 92%. Wean off as tolerated.  - Pulm hygiene, IS, activity and deep breathing  - ABX detailed below   - continue BID Mucomyst and Duoneb, consider making PRN as cough resolves/secretions thin     Neurology / MSK:  Acute post-operative pain, improved  Encephalopathy, improved   Anxiety   Chronic pain with opioid dependence  Neuropathy, PTA  Insomnia   Pain well controlled with current regimen:  - Acetaminophen  - 1/1 vEEG without seizure activity  (severe encephalopathy) and 12/31, 1/4, and 1/7 CTH negative for acute findings.   - PTA Sertraline  - PTA PRN hydroxyzine   - PO oxycodone PRN  - PTA baclofen   - discontinue trazodone with prolonged QTc   - Melatonin qHS        / Renal / Fluid / Electrolytes:  Hypernatremia, resolved   AUSTIN/ATN, resolved  Hypervolemia   Urinary retention   Hypokalemia   BL creat ~ 0.6-0.8, most recent creatinine 0.52; adequate UOP.   FLUID STATUS: Pre-op weight 167 lbs, weight today 195 lbs; I/O past 24 hours: -2.3 L.   - Diuresis: 5 mg of metolazone, 60 mg of IV lasix, goal net negative 1-2 L   - 60 meq of potassium   - Repeat BMP 1600   - Replete lytes per protocol  - Strict I/O, daily weights  - Avoid/limit nephrotoxins as able    GI / Nutrition:   Dysphagia   At risk for malnutrition   Liquid stools   - SLP following  Orders Placed This Encounter      Full Liquid Diet  - TF @ goal via NJ   - Continue bowel regimen, last BM 1/12/25   - 800 ml of stool, will give imodium 2 mg Q6h for 3 doses     Endocrine:  Stress induced hyperglycemia   Type 2 Diabetes Mellitus   Hgb A1c 5.7  - Initially managed on insulin drip postop, transitioned to sliding scale  - goal BG <180 for optimal healing  - Lantus increased to PTA dosing 26 units daily     Infectious Disease:  Stress induced leukocytosis  Sternal wound infection  Aspiration PNA: Klebsiella pneumoniae   WBC 12.9, remains afebrile, no signs or symptoms of infection  - Completed perioperative antibiotics  - Continue to monitor fever curve, CBC  - Klebsiella pneumoniae from sputum cultures on 12/31 and 1/9.   - Zosyn and vancomycin were restarted 1/9 for fever. Sputum culture again grew Klebsiella pneumoniae.   - 1/11 noted to have small puss drainage from sternotomy    - Continue Vanc/Zosyn per Dr. Rosenbaum   - Obtain CT chest with contrast today     Hematology:   Acute blood loss anemia and thrombocytopenia  Hgb 8.6; Plt 394, no signs or symptoms of active bleeding  - CBC  daily    Anticoagulation:   -  mg     MSK/Skin:  Sternotomy  Surgical incision  - Sternal precautions  - Incisional cares per protocol    Prophylaxis:   - Stress ulcer prophylaxis: Pantoprazole 40 mg daily for 30 days  - DVT prophylaxis: Subcutaneous heparin, SCD    Disposition:   - Transferred to  on pending, transfer orders placed 1/12/25   - Therapies recommending discharge to TCU    Discussed with Dr. Meier and CVTS team     Antonio Hernandez DNP APRN CNP CCRN   Critical Care   Available on Vocea or Secure Chat   Pager [phone]          Interval History:     No overnight events.  Trazodone helpful for sleep.   States pain is well managed on current regimen. Slept well overnight.  Less drainage from sternotomy.   Tolerating diet and tube feeds, is passing flatus, + BM. No nausea or vomiting.  Breathing well without complaints.   Working with therapies.   Denies chest pain, palpitations, dizziness, syncopal symptoms, fevers, chills, myalgias, or sternal popping/clicking.         Physical Exam:     Gen: A&Ox4, NAD  Neuro: no focal deficits   CV: RRR, normal S1 S2, no murmurs, rubs or gallops.    Pulm: CTA, no wheezing or rhonchi, normal breathing on RA. + subcutaneous emphysema on RUE.   Abd: nondistended, normal BS, soft, nontender  Ext: 2-3+ peripheral edema  Incision: clean, dry, intact, no erythema, sternum stable  Tubes/drain sites: clean, dry, intact, no erythema         Data:    Imaging:  reviewed recent imaging, no acute concerns    Recent Results (from the past 24 hours)   XR Abdomen Port 1 View    Narrative    EXAM: XR ABDOMEN PORT 1 VIEW, 1/11/2025 2:31 PM    INDICATION: verify NJ placement post extubation.    COMPARISON: 1/6/2025    FINDINGS:  Enteric tube tip is likely in the distal duodenum. Stimulator device  and lead projecting over the thoracic spine. Sternotomy wires and left  atrial appendage clip. Air-filled, nondilated small bowel loops. Gas  is seen within the colon and rectum.  No  free intraperitoneal air,  pneumatosis or portal venous gas. No abnormal calcifications. No acute  or suspicious bone abnormalities. The visualized lung show atelectasis  and possible small pleural effusions.        Impression    IMPRESSION:   Enteric tube tip likely in the distal duodenum.    AMY LAU DO         SYSTEM ID:  [id number]   XR Chest Port 1 View    Narrative    EXAM: XR CHEST PORT 1 VIEW 1/11/2025 1:11 PM    INDICATION: post pleural tube removal    COMPARISON: 0122 hours    TECHNIQUE: Single AP view of the chest.    FINDINGS:   Interval removal of right pleural chest tube with residual small right  apical pneumothorax. There is ongoing severe subcutaneous emphysema  along the right chest wall. Left atrial appendage clipping. Enteric  tube terminates below the diaphragm. Intact sternotomy wires.    Trachea is midline.  Cardiac silhouette is normal in size.  Right  hemidiaphragm elevation. No focal pulmonary opacities.  No pleural  effusion.  Bones are unchanged.      Impression    IMPRESSION:   1. After removal of the right pleural chest tube, residual small right  apical pneumothorax. There is still significant subcutaneous emphysema  along the right chest wall.  2. Endotracheal tube is been removed. Additional support devices and  surgical hardware is stable.    I have personally reviewed the examination and initial interpretation  and I agree with the findings.    AMY LAU DO         SYSTEM ID:  [id number]        Labs:  Recent Labs   Lab 01/12/25  0604 01/12/25  0516 01/12/25  0134 01/12/25  0013 01/11/25  2058 01/11/25  2033 01/11/25  1722 01/11/25  0424 01/11/25  0412 01/10/25  1630 01/10/25  1418 01/10/25  0439 01/10/25  0431   WBC 12.9*  --   --   --   --   --   --   --  10.2  --   --   --  13.2*   HGB 8.6*  --   --   --   --   --   --   --  7.5*  --   --   --  7.4*   MCV 92  --   --   --   --   --   --   --  93  --   --   --  95     --   --   --   --   --   --   --  290  --   --   --   276   NA  --   --   --   --   --   --  142  --  141  --  142  --  145   POTASSIUM  --   --  2.6*  --  3.1*  --  2.4*   < > 2.9*  2.9*   < > 3.4  3.4  --  3.4   CHLORIDE  --   --   --   --   --   --  106  --  110*  --  111*  --  112*   CO2  --   --   --   --   --   --  23  --  22  --  22  --  23   BUN  --   --   --   --   --   --  17.1  --  19.0  --  19.5  --  18.3   CR  --   --   --   --   --   --  0.58*  --  0.63*  --  0.63*  --  0.73   ANIONGAP  --   --   --   --   --   --  13  --  9  --  9  --  10   [place]  --   --   --   --   --   --  8.0*  --  7.4*  --  7.6*  --  7.3*   GLC  --  174*  --  181*  --  185* 187*   < > 176*   < > 183*   < > 166*    < > = values in this interval not displayed.      GLUCOSE:   Recent Labs   Lab 01/12/25  0516 01/12/25  0013 01/11/25 2033 01/11/25  1722 01/11/25  1554 01/11/25  1123   * 181* 185* 187* 164* 178*

## 2025-01-12 NOTE — PHARMACY-VANCOMYCIN DOSING SERVICE
Pharmacy Vancomycin Note  Date of Service 2025  Patient's  1960   64 year old, male    Indication: Bacteremia  Day of Therapy: 3  Current vancomycin regimen:  1250 mg IV q12h  Current vancomycin monitoring method: AUC  Current vancomycin therapeutic monitoring goal: 400-600 mg*h/L    InsightRX Prediction of Current Vancomycin Regimen  Regimen: 1250 mg IV every 12 hours.  Start time: 20:00 on 2025  Exposure target: AUC24 (range)400-600 mg/L.hr   AUC24,ss: 411 mg/L.hr  Probability of AUC24 > 400: 61 %  Ctrough,ss: 9.5 mg/L  Probability of Ctrough,ss > 20: 0 %    Current estimated CrCl = Estimated Creatinine Clearance: 159.7 mL/min (A) (based on SCr of 0.58 mg/dL (L)).    Creatinine for last 3 days  2025:  9:36 PM Creatinine 0.71 mg/dL  2025:  4:08 AM Creatinine 0.69 mg/dL; 10:59 AM Creatinine 0.61 mg/dL;  7:59 PM Creatinine 0.70 mg/dL  1/10/2025:  4:31 AM Creatinine 0.73 mg/dL;  2:18 PM Creatinine 0.63 mg/dL  2025:  4:12 AM Creatinine 0.63 mg/dL;  5:22 PM Creatinine 0.58 mg/dL    Recent Vancomycin Levels (past 3 days)  2025:  8:56 AM Vancomycin 34.7 ug/mL;  5:22 PM Vancomycin 11.2 ug/mL    Vancomycin IV Administrations (past 72 hours)                     vancomycin (VANCOCIN) 1,250 mg in 0.9% NaCl 250 mL intermittent infusion (mg) 1,250 mg New Bag 25 0730     1,250 mg New Bag 01/10/25 1949     1,250 mg New Bag  0807    vancomycin (VANCOCIN) 2,250 mg in sodium chloride 0.9 % 297.5 mL intermittent infusion (mg) 2,250 mg New Bag 25                    Nephrotoxins and other renal medications (From now, onward)      Start     Dose/Rate Route Frequency Ordered Stop    25  vancomycin (VANCOCIN) 1,250 mg in 0.9% NaCl 250 mL intermittent infusion         1,250 mg  166.7 mL/hr over 90 Minutes Intravenous EVERY 12 HOURS 25 1256      25 1300  piperacillin-tazobactam (ZOSYN) 4.5 g vial to attach to  mL bag        Note to Pharmacy: For [facility]  "[facility] and [facility]: For Zosyn-naive patients, use the \"Zosyn initial dose + extended infusion\" order panel.    4.5 g  over 30 Minutes Intravenous EVERY 6 HOURS 01/11/25 1256                 Contrast Orders - past 72 hours (72h ago, onward)      None            Interpretation of levels and current regimen:  Vancomycin level is reflective of therapeutic level    Plan:  Continue Current Dose  Vancomycin monitoring method: AUC  Vancomycin therapeutic monitoring goal: 400-600 mg*h/L  Pharmacy will check vancomycin levels as appropriate in 1-3 Days.  Serum creatinine levels will be ordered daily for the first week of therapy and at least twice weekly for subsequent weeks.    Carol Louis, PharmD  "

## 2025-01-12 NOTE — PLAN OF CARE
ICU End of Shift Summary. See flowsheets for vital signs and detailed assessment.    Changes this shift: Patient extubated this morning, now maintaining sats on room air, but patient is having difficulty handling secretions and requires frequent NT suctioning. He is also increasingly confused this afternoon. He has been hemodynamically stable with mildly elevated temperature to 100.1F. Patient had robust response to lasix this morning, and continues to have high urine output this afternoon. Potassium currently being replaced enterally for critically low level.    Plan: continue to monitor patient status; notify provider of changes.

## 2025-01-12 NOTE — PROGRESS NOTES
01/12/25 0900   Appointment Info   Signing Clinician's Name / Credentials (PT) sharee spears, pt   Rehab Comments (PT) hx of R BKA   Living Environment   People in Home significant other   Current Living Arrangements apartment   Home Accessibility no concerns   Transportation Anticipated car, drives self;family or friend will provide   Living Environment Comments patient confused but provided social hx, may be unreliable   Self-Care   Usual Activity Tolerance other (see comments)  (unknown, patient unable to provide accurate PLOF)   Current Activity Tolerance poor   Regular Exercise No   Equipment Currently Used at Home wheelchair, manual;prosthesis;walker, rolling   Fall history within last six months yes   Number of times patient has fallen within last six months   (numerous per chart)   Activity/Exercise/Self-Care Comment reports ambulation with R prosthesis and wheeled walker (cannot state FWW vs 4WW); also reports using manual wheelchair for mobility; however patient appears to be unreliable historian.   General Information   Onset of Illness/Injury or Date of Surgery 01/03/25   Referring Physician Sonia Fajardo PA-C   Patient/Family Therapy Goals Statement (PT) unable to state   Pertinent History of Current Problem (include personal factors and/or comorbidities that impact the POC) Eriwn Aguilar is a 64 year old male with a past medical history of DM2, HTN, PVD, and neuropathic pain who initially presented on 12/31/24 to the ED with arm pain, vomiting, and diarrhea; ACS and ST depression. He subsequently had VT arrest with 1 round of CPR, epinephrine, defibrillation. After ROSC was found to be in afib with RVR. Became hypotensive and was subsequently cannulated for VA ECMO. Found to have multivessel disease s/p CABG x4 on 1/2/25 with Dr. Rosenbaum. ECMO de cannulated on 1/3/25 at bedside. ICU course complicated by large R ptx seen on chest CT on 1/4/25 s/p bedside CT placement. Extubated 1/11/25.   Existing  Precautions/Restrictions sternal   Cognition   Affect/Mental Status (Cognition) confused   Orientation Status (Cognition) disoriented to;place;situation   Follows Commands (Cognition) follows one-step commands;50-74% accuracy   Pain Assessment   Patient Currently in Pain Yes, see Vital Sign flowsheet   Posture    Posture Forward head position;Protracted shoulders   Range of Motion (ROM)   ROM Comment B LE grossly WFL   Strength (Manual Muscle Testing)   Strength Comments B LE grossly 3-/5; difficult to accurately assess due to inconsistent command following   Bed Mobility   Comment, (Bed Mobility) roll L <> R max assist   Balance   Balance Comments required min/mod assist to maintain sitting balance at EOB   Sensory Examination   Sensory Perception Comments light touch intact B LE, unable to accurately assess proprioception   Muscle Tone   Muscle Tone no deficits were identified   Clinical Impression   Criteria for Skilled Therapeutic Intervention Yes, treatment indicated   PT Diagnosis (PT) impaired functional mobility s/p VT arrest and subsequent CABG x4 complicated by need for ECMO   Influenced by the following impairments confusion, decreased strength, impaired balance, hx of BKA, decreased activity tolerance   Functional limitations due to impairments impaired bed mobility, impaired transfers, impaired gait   Clinical Presentation (PT Evaluation Complexity) evolving   Clinical Presentation Rationale comorbidities, clinical judgement   Clinical Decision Making (Complexity) moderate complexity   Planned Therapy Interventions (PT) balance training;bed mobility training;gait training;neuromuscular re-education;transfer training;progressive activity/exercise;wheelchair management/propulsion training   Risk & Benefits of therapy have been explained evaluation/treatment results reviewed;care plan/treatment goals reviewed   PT Total Evaluation Time   PT Armando, Moderate Complexity Minutes (12258) 8   Physical Therapy  Goals   PT Frequency 5x/week   PT Predicted Duration/Target Date for Goal Attainment 01/24/25   PT Goals Bed Mobility;Transfers;Wheelchair Mobility   PT: Bed Mobility Independent;Supine to/from sit;Within precautions   PT: Transfers Bed to/from chair;Within precautions;Maximum assist  (with use of sliding board)   PT: Wheelchair Mobility 50 feet;Caregiver Min assist;manual wheelchair   PT Discharge Planning   PT Plan EOB, LE AROM, bed >chair   PT Discharge Recommendation (DC Rec) Transitional Care Facility   PT Rationale for DC Rec dependence with functional mobility, unable to stand or ambulate functional distance   PT Brief overview of current status roll L<>R max assist, bed > chair via lift   Physical Therapy Time and Intention   Total Session Time (sum of timed and untimed services) 8

## 2025-01-12 NOTE — PLAN OF CARE
"./60 (BP Location: Left arm)   Pulse 87   Temp 99  F (37.2  C) (Oral)   Resp 26   Ht 1.803 m (5' 11\")   Wt 88.9 kg (195 lb 15.8 oz)   SpO2 100%   BMI 27.33 kg/m      See flowsheet for detailed VS and assessments; ICU standards of care followed throughout shift.    Shift Summary: Autodiuresing overnight; potassium dipped and is actively being replaced; Patient delirius at beginning of shift with hallucination of animals in his room. Trazadone given for sleep and patient seemed to be more alert and clear this AM.     Neuro: Weak; unable to elevated elbows off bed; borderline febrile; alert to self/place/situation;   Cards: SR, PVCs; MAP >65;   Resp: RA with thick secretions; able to cough/deep breathe. Suctioned intermittently with relief. LS course  GI: Rectal tube with 300 green watery stool overnight; TF at goal of 45 SFWF; Clear liquid diet, he is drinking well.  : Ca with 150cc/hr  Skin: Scabs/bruising scattered.  Blister on first finger of right hand.  Blanchable erythema on sacrum/coccyx.  Swollen and crepitis R arm, Sutured ecmo site to right groin - red and dry drainage.  Gtts: none  Lines/tubes: 2 PIV; rectal tube, ca  Labs: K 2.6 (replaced)    Plan: continue to promote rest and prevent delirium; replace potassium; increase activity.    Pako Greer BSN, RN, CCRN          Goal Outcome Evaluation:                 Outcome Evaluation: Delirium clearing; no pain; diuresing well; potassium being replaced      "

## 2025-01-13 ENCOUNTER — APPOINTMENT (OUTPATIENT)
Dept: PHYSICAL THERAPY | Facility: CLINIC | Age: 65
DRG: 003 | End: 2025-01-13
Payer: MEDICARE

## 2025-01-13 ENCOUNTER — APPOINTMENT (OUTPATIENT)
Dept: SPEECH THERAPY | Facility: CLINIC | Age: 65
DRG: 003 | End: 2025-01-13
Payer: MEDICARE

## 2025-01-13 LAB
ANION GAP SERPL CALCULATED.3IONS-SCNC: 11 MMOL/L (ref 7–15)
ANION GAP SERPL CALCULATED.3IONS-SCNC: 11 MMOL/L (ref 7–15)
ATRIAL RATE - MUSE: 79 BPM
ATRIAL RATE - MUSE: 86 BPM
BACTERIA BLD CULT: NO GROWTH
BACTERIA WND CULT: NO GROWTH
BUN SERPL-MCNC: 19.5 MG/DL (ref 8–23)
BUN SERPL-MCNC: 19.8 MG/DL (ref 8–23)
CALCIUM SERPL-MCNC: 8 MG/DL (ref 8.8–10.4)
CALCIUM SERPL-MCNC: 8.2 MG/DL (ref 8.8–10.4)
CHLORIDE SERPL-SCNC: 106 MMOL/L (ref 98–107)
CHLORIDE SERPL-SCNC: 108 MMOL/L (ref 98–107)
CREAT SERPL-MCNC: 0.45 MG/DL (ref 0.67–1.17)
CREAT SERPL-MCNC: 0.49 MG/DL (ref 0.67–1.17)
DIASTOLIC BLOOD PRESSURE - MUSE: NORMAL MMHG
DIASTOLIC BLOOD PRESSURE - MUSE: NORMAL MMHG
EGFRCR SERPLBLD CKD-EPI 2021: >90 ML/MIN/1.73M2
EGFRCR SERPLBLD CKD-EPI 2021: >90 ML/MIN/1.73M2
ERYTHROCYTE [DISTWIDTH] IN BLOOD BY AUTOMATED COUNT: 17.6 % (ref 10–15)
GLUCOSE BLDC GLUCOMTR-MCNC: 125 MG/DL (ref 70–99)
GLUCOSE BLDC GLUCOMTR-MCNC: 129 MG/DL (ref 70–99)
GLUCOSE BLDC GLUCOMTR-MCNC: 137 MG/DL (ref 70–99)
GLUCOSE BLDC GLUCOMTR-MCNC: 163 MG/DL (ref 70–99)
GLUCOSE BLDC GLUCOMTR-MCNC: 166 MG/DL (ref 70–99)
GLUCOSE BLDC GLUCOMTR-MCNC: 167 MG/DL (ref 70–99)
GLUCOSE BLDC GLUCOMTR-MCNC: 169 MG/DL (ref 70–99)
GLUCOSE SERPL-MCNC: 155 MG/DL (ref 70–99)
GLUCOSE SERPL-MCNC: 179 MG/DL (ref 70–99)
GRAM STAIN RESULT: NORMAL
GRAM STAIN RESULT: NORMAL
HCO3 SERPL-SCNC: 23 MMOL/L (ref 22–29)
HCO3 SERPL-SCNC: 23 MMOL/L (ref 22–29)
HCT VFR BLD AUTO: 27.6 % (ref 40–53)
HGB BLD-MCNC: 8.7 G/DL (ref 13.3–17.7)
INTERPRETATION ECG - MUSE: NORMAL
INTERPRETATION ECG - MUSE: NORMAL
MAGNESIUM SERPL-MCNC: 2.1 MG/DL (ref 1.7–2.3)
MCH RBC QN AUTO: 29.8 PG (ref 26.5–33)
MCHC RBC AUTO-ENTMCNC: 31.5 G/DL (ref 31.5–36.5)
MCV RBC AUTO: 95 FL (ref 78–100)
P AXIS - MUSE: 34 DEGREES
P AXIS - MUSE: 44 DEGREES
PHOSPHATE SERPL-MCNC: 2.9 MG/DL (ref 2.5–4.5)
PLATELET # BLD AUTO: 425 10E3/UL (ref 150–450)
POTASSIUM SERPL-SCNC: 3 MMOL/L (ref 3.4–5.3)
POTASSIUM SERPL-SCNC: 3.1 MMOL/L (ref 3.4–5.3)
POTASSIUM SERPL-SCNC: 3.6 MMOL/L (ref 3.4–5.3)
PR INTERVAL - MUSE: 140 MS
PR INTERVAL - MUSE: 150 MS
QRS DURATION - MUSE: 82 MS
QRS DURATION - MUSE: 84 MS
QT - MUSE: 448 MS
QT - MUSE: 468 MS
QTC - MUSE: 536 MS
QTC - MUSE: 536 MS
R AXIS - MUSE: -2 DEGREES
R AXIS - MUSE: -4 DEGREES
RBC # BLD AUTO: 2.92 10E6/UL (ref 4.4–5.9)
SODIUM SERPL-SCNC: 140 MMOL/L (ref 135–145)
SODIUM SERPL-SCNC: 142 MMOL/L (ref 135–145)
SYSTOLIC BLOOD PRESSURE - MUSE: NORMAL MMHG
SYSTOLIC BLOOD PRESSURE - MUSE: NORMAL MMHG
T AXIS - MUSE: 153 DEGREES
T AXIS - MUSE: 158 DEGREES
VENTRICULAR RATE- MUSE: 79 BPM
VENTRICULAR RATE- MUSE: 86 BPM
WBC # BLD AUTO: 12.3 10E3/UL (ref 4–11)

## 2025-01-13 PROCEDURE — 250N000013 HC RX MED GY IP 250 OP 250 PS 637: Performed by: NURSE PRACTITIONER

## 2025-01-13 PROCEDURE — 250N000011 HC RX IP 250 OP 636: Performed by: NURSE PRACTITIONER

## 2025-01-13 PROCEDURE — 94640 AIRWAY INHALATION TREATMENT: CPT

## 2025-01-13 PROCEDURE — 250N000011 HC RX IP 250 OP 636: Performed by: STUDENT IN AN ORGANIZED HEALTH CARE EDUCATION/TRAINING PROGRAM

## 2025-01-13 PROCEDURE — 94640 AIRWAY INHALATION TREATMENT: CPT | Mod: 76

## 2025-01-13 PROCEDURE — 85014 HEMATOCRIT: CPT

## 2025-01-13 PROCEDURE — 85041 AUTOMATED RBC COUNT: CPT

## 2025-01-13 PROCEDURE — 36415 COLL VENOUS BLD VENIPUNCTURE: CPT | Performed by: NURSE PRACTITIONER

## 2025-01-13 PROCEDURE — 250N000013 HC RX MED GY IP 250 OP 250 PS 637

## 2025-01-13 PROCEDURE — 36415 COLL VENOUS BLD VENIPUNCTURE: CPT

## 2025-01-13 PROCEDURE — 120N000003 HC R&B IMCU UMMC

## 2025-01-13 PROCEDURE — 80048 BASIC METABOLIC PNL TOTAL CA: CPT | Performed by: NURSE PRACTITIONER

## 2025-01-13 PROCEDURE — 82435 ASSAY OF BLOOD CHLORIDE: CPT | Performed by: NURSE PRACTITIONER

## 2025-01-13 PROCEDURE — 999N000157 HC STATISTIC RCP TIME EA 10 MIN

## 2025-01-13 PROCEDURE — 84100 ASSAY OF PHOSPHORUS: CPT | Performed by: STUDENT IN AN ORGANIZED HEALTH CARE EDUCATION/TRAINING PROGRAM

## 2025-01-13 PROCEDURE — 97530 THERAPEUTIC ACTIVITIES: CPT | Mod: GP | Performed by: PHYSICAL THERAPIST

## 2025-01-13 PROCEDURE — 93005 ELECTROCARDIOGRAM TRACING: CPT

## 2025-01-13 PROCEDURE — 80048 BASIC METABOLIC PNL TOTAL CA: CPT

## 2025-01-13 PROCEDURE — 250N000013 HC RX MED GY IP 250 OP 250 PS 637: Performed by: STUDENT IN AN ORGANIZED HEALTH CARE EDUCATION/TRAINING PROGRAM

## 2025-01-13 PROCEDURE — 92526 ORAL FUNCTION THERAPY: CPT | Mod: GN | Performed by: REHABILITATION PRACTITIONER

## 2025-01-13 PROCEDURE — 250N000009 HC RX 250: Performed by: STUDENT IN AN ORGANIZED HEALTH CARE EDUCATION/TRAINING PROGRAM

## 2025-01-13 PROCEDURE — 84132 ASSAY OF SERUM POTASSIUM: CPT | Performed by: NURSE PRACTITIONER

## 2025-01-13 PROCEDURE — 82565 ASSAY OF CREATININE: CPT | Performed by: NURSE PRACTITIONER

## 2025-01-13 PROCEDURE — 83735 ASSAY OF MAGNESIUM: CPT | Performed by: STUDENT IN AN ORGANIZED HEALTH CARE EDUCATION/TRAINING PROGRAM

## 2025-01-13 RX ORDER — BACLOFEN 10 MG/1
10 TABLET ORAL 3 TIMES DAILY PRN
COMMUNITY

## 2025-01-13 RX ORDER — POTASSIUM CHLORIDE 1.5 G/1.58G
20 POWDER, FOR SOLUTION ORAL ONCE
Status: COMPLETED | OUTPATIENT
Start: 2025-01-13 | End: 2025-01-13

## 2025-01-13 RX ORDER — POTASSIUM CHLORIDE 750 MG/1
40 TABLET, EXTENDED RELEASE ORAL ONCE
Status: COMPLETED | OUTPATIENT
Start: 2025-01-13 | End: 2025-01-13

## 2025-01-13 RX ORDER — LOPERAMIDE HCL 1 MG/7.5ML
2 SOLUTION ORAL 4 TIMES DAILY PRN
Status: DISCONTINUED | OUTPATIENT
Start: 2025-01-13 | End: 2025-01-13

## 2025-01-13 RX ORDER — POTASSIUM CHLORIDE 1.5 G/1.58G
40 POWDER, FOR SOLUTION ORAL ONCE
Status: COMPLETED | OUTPATIENT
Start: 2025-01-13 | End: 2025-01-13

## 2025-01-13 RX ORDER — FUROSEMIDE 10 MG/ML
40 INJECTION INTRAMUSCULAR; INTRAVENOUS ONCE
Status: COMPLETED | OUTPATIENT
Start: 2025-01-13 | End: 2025-01-13

## 2025-01-13 RX ORDER — POTASSIUM CHLORIDE 1.5 G/1.58G
40 POWDER, FOR SOLUTION ORAL 2 TIMES DAILY
Status: DISCONTINUED | OUTPATIENT
Start: 2025-01-13 | End: 2025-01-14

## 2025-01-13 RX ORDER — LOPERAMIDE HYDROCHLORIDE 2 MG/1
2 CAPSULE ORAL 4 TIMES DAILY PRN
Status: DISCONTINUED | OUTPATIENT
Start: 2025-01-13 | End: 2025-01-25 | Stop reason: HOSPADM

## 2025-01-13 RX ADMIN — METOPROLOL TARTRATE 25 MG: 25 TABLET, FILM COATED ORAL at 19:16

## 2025-01-13 RX ADMIN — Medication 1250 MG: at 19:37

## 2025-01-13 RX ADMIN — PIPERACILLIN SODIUM AND TAZOBACTAM SODIUM 3.38 G: 3; .375 INJECTION, SOLUTION INTRAVENOUS at 18:41

## 2025-01-13 RX ADMIN — HEPARIN SODIUM 5000 UNITS: 5000 INJECTION, SOLUTION INTRAVENOUS; SUBCUTANEOUS at 16:31

## 2025-01-13 RX ADMIN — ACETYLCYSTEINE 2 ML: 200 SOLUTION ORAL; RESPIRATORY (INHALATION) at 20:28

## 2025-01-13 RX ADMIN — Medication 60 ML: at 19:09

## 2025-01-13 RX ADMIN — Medication 60 ML: at 08:22

## 2025-01-13 RX ADMIN — SERTRALINE HYDROCHLORIDE 200 MG: 100 TABLET ORAL at 08:20

## 2025-01-13 RX ADMIN — POTASSIUM CHLORIDE 20 MEQ: 1.5 POWDER, FOR SOLUTION ORAL at 08:20

## 2025-01-13 RX ADMIN — ACETYLCYSTEINE 2 ML: 200 SOLUTION ORAL; RESPIRATORY (INHALATION) at 10:05

## 2025-01-13 RX ADMIN — POTASSIUM CHLORIDE 40 MEQ: 1.5 POWDER, FOR SOLUTION ORAL at 10:34

## 2025-01-13 RX ADMIN — CALCIUM CARBONATE (ANTACID) CHEW TAB 500 MG 1000 MG: 500 CHEW TAB at 08:20

## 2025-01-13 RX ADMIN — ASPIRIN 81 MG CHEWABLE TABLET 162 MG: 81 TABLET CHEWABLE at 08:20

## 2025-01-13 RX ADMIN — PIPERACILLIN AND TAZOBACTAM 4.5 G: 4; .5 INJECTION, POWDER, FOR SOLUTION INTRAVENOUS at 00:11

## 2025-01-13 RX ADMIN — INSULIN ASPART 1 UNITS: 100 INJECTION, SOLUTION INTRAVENOUS; SUBCUTANEOUS at 00:01

## 2025-01-13 RX ADMIN — INSULIN ASPART 2 UNITS: 100 INJECTION, SOLUTION INTRAVENOUS; SUBCUTANEOUS at 19:33

## 2025-01-13 RX ADMIN — FUROSEMIDE 40 MG: 10 INJECTION, SOLUTION INTRAMUSCULAR; INTRAVENOUS at 10:34

## 2025-01-13 RX ADMIN — POTASSIUM CHLORIDE 40 MEQ: 1.5 POWDER, FOR SOLUTION ORAL at 06:26

## 2025-01-13 RX ADMIN — THERA TABS 1 TABLET: TAB at 08:20

## 2025-01-13 RX ADMIN — IPRATROPIUM BROMIDE AND ALBUTEROL SULFATE 3 ML: .5; 3 SOLUTION RESPIRATORY (INHALATION) at 20:28

## 2025-01-13 RX ADMIN — HEPARIN SODIUM 5000 UNITS: 5000 INJECTION, SOLUTION INTRAVENOUS; SUBCUTANEOUS at 08:15

## 2025-01-13 RX ADMIN — METOPROLOL TARTRATE 25 MG: 25 TABLET, FILM COATED ORAL at 08:19

## 2025-01-13 RX ADMIN — LOPERAMIDE HYDROCHLORIDE 2 MG: 2 CAPSULE ORAL at 21:27

## 2025-01-13 RX ADMIN — BACLOFEN 10 MG: 10 TABLET ORAL at 08:20

## 2025-01-13 RX ADMIN — INSULIN ASPART 2 UNITS: 100 INJECTION, SOLUTION INTRAVENOUS; SUBCUTANEOUS at 08:37

## 2025-01-13 RX ADMIN — Medication 1250 MG: at 08:07

## 2025-01-13 RX ADMIN — POTASSIUM CHLORIDE 40 MEQ: 750 TABLET, EXTENDED RELEASE ORAL at 17:48

## 2025-01-13 RX ADMIN — Medication 5 MG: at 18:41

## 2025-01-13 RX ADMIN — INSULIN ASPART 2 UNITS: 100 INJECTION, SOLUTION INTRAVENOUS; SUBCUTANEOUS at 03:52

## 2025-01-13 RX ADMIN — POTASSIUM CHLORIDE 40 MEQ: 1.5 POWDER, FOR SOLUTION ORAL at 19:13

## 2025-01-13 RX ADMIN — ROSUVASTATIN CALCIUM 40 MG: 20 TABLET, FILM COATED ORAL at 08:19

## 2025-01-13 RX ADMIN — IPRATROPIUM BROMIDE AND ALBUTEROL SULFATE 3 ML: .5; 3 SOLUTION RESPIRATORY (INHALATION) at 10:05

## 2025-01-13 RX ADMIN — CALCIUM CARBONATE (ANTACID) CHEW TAB 500 MG 1000 MG: 500 CHEW TAB at 16:30

## 2025-01-13 ASSESSMENT — ACTIVITIES OF DAILY LIVING (ADL)
ADLS_ACUITY_SCORE: 50
ADLS_ACUITY_SCORE: 55
ADLS_ACUITY_SCORE: 52
ADLS_ACUITY_SCORE: 55
ADLS_ACUITY_SCORE: 54
ADLS_ACUITY_SCORE: 55
ADLS_ACUITY_SCORE: 56
ADLS_ACUITY_SCORE: 54
ADLS_ACUITY_SCORE: 55
ADLS_ACUITY_SCORE: 55
ADLS_ACUITY_SCORE: 54
ADLS_ACUITY_SCORE: 55
ADLS_ACUITY_SCORE: 56
ADLS_ACUITY_SCORE: 56
ADLS_ACUITY_SCORE: 52
ADLS_ACUITY_SCORE: 52
ADLS_ACUITY_SCORE: 55
ADLS_ACUITY_SCORE: 54
ADLS_ACUITY_SCORE: 50

## 2025-01-13 NOTE — PHARMACY-ADMISSION MEDICATION HISTORY
"Pharmacy Intern Admission Medication History    Admission medication history is complete. The information provided in this note is only as accurate as the sources available at the time of the update.    Information Source(s): Patient and CareEverywhere/SureScripts via in-person    Pertinent Information:   The patient verified his medication list but is unsure of last doses of his medications as he was admitted ~2 weeks ago.   Fill history for several medications shows last fill dates in July and August of 2024. Unable to assess adherence to medications.     Changes made to PTA medication list:  Added: None  Deleted:   Dulcolax  Doxazosin  Lidocaine patch  Lidocaine cream  Methocarbamol  Oxycodone 5 mg  Polyethylene glycol  Promethazine  Changed: Marked aspirin as \"not taking\"    Allergies reviewed with patient and updates made in EHR: yes    Medication History Completed By: Rosemarie Shi 1/13/2025 9:28 AM    PTA Med List   Medication Sig Last Dose/Taking    albuterol (PROAIR HFA/PROVENTIL HFA/VENTOLIN HFA) 108 (90 Base) MCG/ACT inhaler INHALE 2 PUFFS BY MOUTH EVERY 6 HOURS AS NEEDED FOR SHORTNESS OF BREATH OR WHEEZING Past Month    apixaban ANTICOAGULANT (ELIQUIS ANTICOAGULANT) 5 MG tablet Take 1 tablet (5 mg) by mouth 2 times daily Taking    baclofen (LIORESAL) 10 MG tablet Take 10 mg by mouth 3 times daily as needed for muscle spasms. More than a month    gabapentin (NEURONTIN) 300 MG capsule Take 600 mg ( two pills) by mouth in the afternoon and 900 mg (three pills)  at bedtime Unknown    hydrOXYzine HCl (ATARAX) 25 MG tablet Take 1 tablet (25 mg) by mouth every 6 hours as needed for other (adjuvant pain) Past Month    insulin glargine (LANTUS SOLOSTAR) 100 UNIT/ML pen INJECT 26 UNITS SUBCUTANEOUSLY AT BEDTIME Unknown    medical cannabis (Patient's own supply) See Admin Instructions (The purpose of this order is to document that the patient reports taking medical cannabis.  This is not a prescription, and is not " used to certify that the patient has a qualifying medical condition.)   Flower - PRN Taking    ondansetron (ZOFRAN) 4 MG tablet Take 1 tablet (4 mg) by mouth 2 times daily as needed for nausea. More than a month    oxyCODONE IR (ROXICODONE) 10 MG tablet Take 1 tablet (10 mg) by mouth 3 times daily as needed for severe pain. Past Month    propranolol (INDERAL) 40 MG tablet Take 1 tablet (40 mg) by mouth 3 times daily Unknown    rosuvastatin (CRESTOR) 40 MG tablet Take 1 tablet (40 mg) by mouth daily Unknown    sertraline (ZOLOFT) 100 MG tablet TAKE 2 TABLETS BY MOUTH DAILY Unknown    triamterene-HCTZ (DYAZIDE) 37.5-25 MG capsule Take 1 capsule by mouth every morning Unknown

## 2025-01-13 NOTE — PROGRESS NOTES
Transfer  Transferred from: CVICU   Via:bed  Reason for transfer: Pt appropriate for 6c improved patient condition  Family: Aware of transfer  Belongings: Received with pt  Chart: Received with pt  Medications: Meds received from old unit with pt  Code Status verified on armband: yes  2 RN Skin Assessment Completed By: Paula Zuniga RN, Angella Cross RN.  Sternal incision FRANCES, old abd CT sites FRANCES, Old RIJ FRANCES, R old CT site dressing changed CDI, Left abd exposed blister frances, R groin (ecmo) site frances sutures still in place, 2 right groin blisters (one intact and one exposed, dressing changed), LLE harvest sites x2 frances, left posterior thigh bruising, RUE edema +2-3, crepitus R upper chest and RUE, LLE discoloration, mepilex on coccyx cdi, Lower abd bruising.   Suction/Ambu bag/Flowmeter at bedside: yes    Report received from: Pako VAUGHN  Pt status: stable upon arrival

## 2025-01-13 NOTE — PLAN OF CARE
"Hours of care: 3057-5448    BP (!) 146/69 (BP Location: Right arm)   Pulse 85   Temp 98.3  F (36.8  C) (Axillary)   Resp 22   Ht 1.803 m (5' 11\")   Wt 88.9 kg (195 lb 15.8 oz)   SpO2 94%   BMI 27.33 kg/m        Neuro: A&O x3, denied pain and dizziness. Occasionally disoriented to time and situation.  Increased confusion as the night progressed. Did have hallucinations overnight, reported seeing a car parked outside. Attempted to reorient with little success. Repetitive questions.   Respiratory:  Coarse lung sounds, diminished in the bases. Chest rise equal and expansion symmetric. Crepitus noted in the right upper chest and RUE.    Cardiac: S1, S2 heart sounds noted. Sinus rhythm. Had 2-3+ edema in the RUE. Denied numbness and tingling in extremities   GI: Denied nausea, bowel sounds normoactive.  TF continued at 60 ml/hr. Rectal tube in place and draining well, minimal leaking. Loose stools.   : Had adequate urine output, ca in place and draining well, see I&O flowsheet.  Skin: See PCS for assessment and treatment of wounds and surgical incisions.   Electrolytes: mag and k replaced per protocol.  Tests/procedures: EKG performed during shift, see Chart Review for results.      Goal Outcome Evaluation:      Plan of Care Reviewed With: patient    Overall Patient Progress: no change    Outcome Evaluation: Pt tx from CVICU, confused, hallucinating, mag and k replaced      "

## 2025-01-13 NOTE — PLAN OF CARE
Neuro: A&Ox2-3. Disoriented to time and/or place with hallucinations  Cardiac: SR. Hr 70s-80s. VSS.   Respiratory: Sating >94% on RA.  GI/: Rodriguez removed, pt voiding with good output via Primofit. Rectal tube remains in place with moderate output of liquid stools.  Diet/appetite: Tolerating clear liquid diet plus continuous TF at 60 mL/hr + 30 mL FWF Q4H via NJ. No appetite for oral intake.   Activity:  Assist of 2 with lift, up to chair.  Pain: Denies pain.  Skin: No new deficits noted.  LDA's: NJ- infusing TF, R & L PIV, Rectal tube    Shift Updates:  Rodriguez removed today. SLP conducted swallow study with recommendations to advance diet to pureed foods. Lasix given and K+ replaced.    Plan: Continue with POC. Notify primary team with changes.     Goal Outcome Evaluation:      Plan of Care Reviewed With: patient    Overall Patient Progress: no change    Outcome Evaluation: Pt afebrile VSS on RA. Disoriented to place and time, confused, and hallucinating. Rodriguez removed, rectal tube remains in place after patient refused its removal.

## 2025-01-13 NOTE — PROGRESS NOTES
Care Management Follow Up    Length of Stay (days): 13    Expected Discharge Date: 01/17/2025     Concerns to be Addressed: discharge planning, financial/insurance     Patient plan of care discussed at interdisciplinary rounds: Yes    Anticipated Discharge Disposition: Acute Rehab, Skilled Nursing Facility              Anticipated Discharge Services: None  Anticipated Discharge DME: None    Patient/family educated on Medicare website which has current facility and service quality ratings: no  Education Provided on the Discharge Plan: Yes  Patient/Family in Agreement with the Plan:      Referrals Placed by CM/SW:    Private pay costs discussed: Not applicable    Discussed  Partnership in Safe Discharge Planning  document with patient/family: No     Handoff Completed: No, handoff not indicated or clinically appropriate    Additional Information:  CM updated in rounds that patient could be ready for TCU by end of week. Medically, patient is to change from IV to oral antibiotics, diet to be advanced, rectal tube to be removed (today). RNCC/SW will continue to follow for planning.    Next Steps: Follow for placement needs      Rodriguez Antonio BSN, BA, RN, CMSRN, RNCC  [facility]  Covering Units [address], [address]  Phone: [phone]  Available on [employer] search 6C [id number] RNCC, 6515-19 RNCC  After Hours [phone]     6C  Ph: [phone]     6B/CRNCC Weekend/holiday on Vocera or [phone]     [facility] RNCC ED/5 Ortho/5 Med/Surg [phone]     [facility] RNCC 6 Med/Surg 8A, [phone]     Observation SW and weekend/after hours phone: [phone]

## 2025-01-13 NOTE — PROGRESS NOTES
Antimicrobial Stewardship Team Note    Antimicrobial Stewardship Program - A joint venture between [facility] and  Physicians to optimize antibiotic management.  NOT a formal consult - Restricted Antimicrobial Review     Patient: Erwin Aguilar  MRN: [id number]  Allergies: Levaquin, Lisinopril, Acetaminophen, Amlodipine, Morphine, Quinolones, Spironolactone, and Bactrim [sulfamethoxazole-trimethoprim]    Brief Summary: Erwin Aguilar is a 64 year old male with history of type 2 diabetes, HTN, PVD, right BKA, and neuropathic pain who presented to the ED on 12/31/2024 with complaints of arm pain, vomiting, and diarrhea found to have STEMI c/b VT arrest with rosc achieved after 1 round of CPR in the ED. Patient was taken to the cath lab found to have multivessel disease and had hypotension with electrical instability, cannulated peripherally for VA ECMO. Started on empiric ceftriaxone and IV vancomycin for possible aspiration pneumonia in the setting of cardiac arrest. Initial blood cultures are negative, endotracheal sputum culture grew 1+ Klebsiella pneumoniae, and nares MRSA PCR negative. IV vancomycin stopped with a plan to complete 7 days of ceftriaxone. Repeat sputum cultures on 1/1 and 1/2 grew normal lyndsey and repeat blood cultures are negative. Patient underwent CABG x4 1/2 and ECMO decannulation with primary repair of right femoral artery and venous cannulation site on 1/3. ICU course was complicated by large right tension pneumothorax seen on chest CT on 1/4/25 s/p chest tube placement at bedside with improvement of pneumothorax. Empiric ceftriaxone was switched to Zosyn and IV vancomycin at the time. Endotracheal sputum culture on 1/4 grew1+ Klebsiella pneumoniae and 1+ Lactobacillus species. All antibiotics were stopped on 1/7, after 8 days of therapy.    The following day on 1/8, patient had new fevers (tmax 101.8). Vitals stable and no changes to oxygen needs. No leukocytosis and UA  unremarkable with 2 WBC and negative leukocyte esterase. Blood cultures were obtained which turned positive with GPC in clusters, later identified as MRSE (in 2 out 4 bottles, in 1 set) and Staph hominis (1/4 bottles). Zosyn and vancomycin were started on 1/9 due to concern for new infection and GPC bacteremia. Repeat blood cultures on 1/9 remain NGTD x3 days and endotracheal sputum culture grew 1+ Klebsiella pneumoniae. Blood cultures on 1/10 are NGTD x2 days and C diff PCR negative (obtained due to watery diarrhea). Chest tubes (3/4) removed on 1/9 and remaining removed on 1/11. Evaluation of right groin ECMO decannualtion site on 1/10 was reported to be soft with minimal erythema and mild superficial dehiscence. Due to concern for sternal wound infection, cultures from sternal wound were sent with aerobic culture being negative and anaerobic culture NGTD x2 days.  Patient was extubated to room air on 1/11 and transferred to floor on 1/12. CT chest on 1/12 showed increased retrosternal fluid status post mediastinal drain removal with mild peripheral hyperattenuation representing pericardial thickening or developing loculation, superimposed infection not excluded. There was also reports of extensive soft tissue air involving the right chest wall, right upper extremity, and right neck. Primary team planning tentative 7-10 course of antibiotics due to CT not concerning for deep infection.          Active Anti-infective Medications   (From admission, onward)                 Start     Stop    01/13/25 1300  piperacillin-tazobactam  3.375 g,   Intravenous,   100 mL/hr,   EVERY 6 HOURS        Skin and Soft Tissue Infection   sternal wound infection       --    01/11/25 2000  vancomycin  1,250 mg,   Intravenous,   166.7 mL/hr,   EVERY 12 HOURS        sternal wound infection       --                  Assessment: Surgical sternal wound infection  Patient developed new fever and mild leukocytosis with findings of positive  blood culture and sternal wound infection. Positive blood cultures with Gram-positive cocci are a contaminant given isolation of 2 different species of coagulase negative Staphylococcus from only one of the two sets of blood cultures. Repeat blood culture remains without growth further indicating blood culture contamination. Patient does not have any indwelling central lines that would be concerning for CLABSI.   Fever and mild leukocytosis is most likely indicative of surgical sternal wound infection, now with down trending fever curve with initiation of IV antibiotics. Patient has otherwise remained hemodynamically stable. Sternal wound culture is negative and no isolation of Pseudomonas in any of the cultures obtained. We recommend deescalating Zosyn to ceftriaxone. Patient does not have history of infection or colonization with MRSA and no MRSA growth on cultures. However, given surgical sternal wound infection, reasonable to continue IV vancomycin for planned duration of 7-10 days. If prolonged duration of antibiotics is needed, we recommend ID consult to help guide antimicrobial therapy.    Recommendations:  Deescalate Zosyn to ceftriaxone 2 g daily to complete planned 7-10 days of therapy (anticipated end date 1/15 or 1/18)  Reasonable to continue IV vancomycin to complete planned 7-10 days of therapy (anticipated end date 1/15 or 1/18)    Pharmacy took the following actions: Electronic note created, Called/paged provider.    Discussed with ID Staff: MD Soraya Rivera, PharmD, BCIDP  Pager: [phone]    Vital Signs/Clinical Features:  Vitals         01/11 0700  01/12 0659 01/12 0700  01/13 0659 01/13 0700  01/13 1409   Most Recent      Temp ( F) 98.6 -  100.1    97.9 -  99.7    97.4 -  97.5     97.4 (36.3) 01/13 1133    Pulse 74 -  101    73 -  88    84 -  85     84 01/13 1133    Resp 16 -  32    18 -  35    18 -  22     18 01/13 1133    /57 -  152/68    97/70 -  147/65    137/67 -   138/64     137/67 01/13 1133    SpO2 (%) 95 -  100    94 -  100    95 -  100     95 01/13 1133            Labs  Estimated Creatinine Clearance: 189.4 mL/min (A) (based on SCr of 0.49 mg/dL (L)).  Recent Labs   Lab Test 01/11/25  0412 01/11/25  1722 01/12/25  0604 01/12/25  1617 01/12/25  2145 01/13/25  0425   CR 0.63* 0.58* 0.52* 0.57* 0.57* 0.49*       Recent Labs   Lab Test 12/20/17  1049 03/14/18  0749 06/30/18  1605 07/01/18  0016 01/14/19  0903 07/02/19  0715 11/23/20  1710 04/09/21  0816 01/09/25  0408 01/09/25  1619 01/10/25  0431 01/11/25  0412 01/12/25  0604 01/13/25  0425   WBC 5.6   < > 12.6*   < > 8.4   < > 10.6   < > 9.5 11.3* 13.2* 10.2 12.9* 12.3*   ANEU 3.3  --  8.8*  --  5.7  --  7.4  --   --   --   --   --   --   --    ALYM 1.5  --  1.9  --  1.6  --  2.2  --   --   --   --   --   --   --    FRANCISCA 0.5  --  1.4*  --  0.6  --  0.5  --   --   --   --   --   --   --    AEOS 0.3  --  0.4  --  0.4  --  0.3  --   --   --   --   --   --   --    HGB 12.5*   < > 11.7*   < > 14.6   < > 14.3   < > 7.7* 7.9* 7.4* 7.5* 8.6* 8.7*   HCT 39.0*   < > 35.0*   < > 44.2   < > 42.2   < > 24.8* 25.6* 24.3* 23.5* 26.6* 27.6*   MCV 84   < > 81   < > 84   < > 88   < > 95 95 95 93 92 95      < > 228   < > 202   < > 295   < > 209 279 276 290 394 425    < > = values in this interval not displayed.       Recent Labs   Lab Test 01/01/25 2128 01/02/25  0333 01/02/25  1722 01/02/25  2024 01/03/25  0336 01/04/25  0216   BILITOTAL 0.3 0.3 0.8 0.9 0.5 0.7   ALKPHOS 84 82 52 59 54 66   ALBUMIN 3.0* 3.1* 2.0* 2.3* 2.4* 2.4*   AST 84* 67* 112* 175* 165* 472*   ALT 19 18 33 62 65 213*       Recent Labs   Lab Test 07/16/22  1342 12/31/24  1107 12/31/24  1740 01/01/25  0417 01/01/25  0817 01/01/25 2128 01/02/25  0333 01/02/25  0900 01/04/25  2235 01/05/25  0322 01/05/25  1553 01/06/25  0322 01/06/25  1454 01/07/25  1045   PCAL  --  0.05  --   --   --  0.19  --   --   --   --   --   --   --   --    LACT 1.0 2.9*   < > 0.5*   < >  --   0.6*   < > 1.3 1.2 1.2 0.6* 0.8 1.3   CRPI 18.80* 7.20*  --  47.00*  --   --  105.00*  --   --   --   --   --   --   --    SED 15 6  --  20  --   --  28*  --   --   --   --   --   --   --     < > = values in this interval not displayed.       Recent Labs   Lab Test 01/11/25  1722   VANCOMYCIN 11.2       Culture Results:  7-Day Micro Results       Procedure Component Value Units Date/Time    Anaerobic Bacterial Culture Routine [[id number]] Collected: 01/11/25 1340    Order Status: Completed Lab Status: Preliminary result Updated: 01/12/25 1446    Specimen: Wound from Sternum      Culture No anaerobic organisms isolated after 1 day    Wound Aerobic Bacterial Culture Routine With Gram Stain [[id number]] Collected: 01/11/25 1339    Order Status: Completed Lab Status: Final result Updated: 01/13/25 0721    Specimen: Wound from Sternum      Culture No Growth     Gram Stain Result No organisms seen      3+ WBC seen     Comment: Predominantly PMNs       C. difficile Toxin B PCR with reflex to C. difficile Antigen and Toxins A/B EIA [[id number]]  (Normal) Collected: 01/10/25 1145    Order Status: Completed Lab Status: Final result Updated: 01/10/25 1417    Specimen: Stool from Per Rectum      C Difficile Toxin B by PCR Negative     Comment: A negative result does not exclude actual disease due to C. difficile and may be due to improper collection, handling and storage of the specimen or the number of organisms in the specimen is below the detection limit of the assay.       Narrative:      The Porphyrio Xpert C. difficile Assay, performed on the [employer], is a qualitative in vitro diagnostic test for rapid detection of toxin B gene sequences from unformed (liquid or soft) stool specimens collected from patients suspected of having Clostridioides difficile infection (CDI). The test utilizes automated real-time polymerase chain reaction (PCR) to detect toxin gene sequences associated with toxin  producing C. difficile. The Xpert C. difficile Assay is intended as an aid in the diagnosis of CDI.    Blood Culture Hand, Right [[id number]]  (Normal) Collected: 01/10/25 1125    Order Status: Completed Lab Status: Preliminary result Updated: 01/13/25 1301    Specimen: Blood from Hand, Right      Culture No growth after 3 days    Blood Culture Arm, Left [[id number]]  (Normal) Collected: 01/10/25 1103    Order Status: Completed Lab Status: Preliminary result Updated: 01/13/25 1301    Specimen: Blood from Arm, Left      Culture No growth after 3 days    Blood Culture Hand, Right [[id number]]  (Normal) Collected: 01/09/25 2028    Order Status: Completed Lab Status: Preliminary result Updated: 01/12/25 2131    Specimen: Blood from Hand, Right      Culture No growth after 3 days    Blood Culture Hand, Left [[id number]]  (Normal) Collected: 01/09/25 2016    Order Status: Completed Lab Status: Preliminary result Updated: 01/12/25 2131    Specimen: Blood from Hand, Left      Culture No growth after 3 days    Respiratory Aerobic Bacterial Culture with Gram Stain [[id number]]  (Abnormal)  (Susceptibility) Collected: 01/09/25 0410    Order Status: Completed Lab Status: Final result Updated: 01/11/25 2200    Specimen: Sputum from Endotracheal      Culture 1+ Klebsiella pneumoniae     Gram Stain Result <25 PMNs/low power field      No organisms seen    Susceptibility       Klebsiella pneumoniae (1)       Antibiotic Interpretation Sensitivity   Method Status    Ampicillin Resistant   JESSICA Final     Intrinsically Resistant       Ampicillin/ Sulbactam Susceptible 4 ug/mL JESSICA Final    Piperacillin/Tazobactam Susceptible <=4 ug/mL JESSICA Final    Cefazolin  [*]  Susceptible 2 ug/mL JESSICA Final    Ceftazidime Susceptible <=0.5 ug/mL JESSICA Final    Ceftriaxone Susceptible <=0.25 ug/mL JESSICA Final    Cefepime Susceptible <=0.12 ug/mL JESSICA Final    Extended Spectrum Beta-Lactamase  [*]  ESBL Negative Negative ug/mL JESSICA Final    Ertapenem   [*]  Susceptible <=0.12 ug/mL JESSICA Final    Meropenem Susceptible <=0.25 ug/mL JESSICA Final    Gentamicin Susceptible <=1 ug/mL JESSICA Final    Ciprofloxacin Susceptible <=0.06 ug/mL JESSICA Final    Levofloxacin Susceptible <=0.12 ug/mL JESSICA Final    Nitrofurantoin  [*]  Resistant 128 ug/mL JESSICA Final    Trimethoprim/Sulfamethoxazole Susceptible <=1/19 ug/mL JESSICA Final               [*]  Suppressed Antibiotic                   Blood Culture Hand, Right [[id number]]  (Abnormal)  (Susceptibility) Collected: 01/08/25 2049    Order Status: Completed Lab Status: Final result Updated: 01/12/25 1011    Specimen: Blood from Hand, Right      Culture Positive on the 1st day of incubation      Staphylococcus hominis     Comment: 1 of 2 bottles  The recovery of this organism from a single blood culture bottle most likely represents contamination. If susceptibility testing is needed, please refer to the antibiogram or contact IDDL. If contamination is suspected, it is important to repeat two sets of blood cultures from two different sites.         Staphylococcus epidermidis     Comment: 2 of 2 bottles       Susceptibility       Staphylococcus epidermidis (4)       Antibiotic Interpretation Sensitivity   Method Status    Oxacillin Resistant >=4 ug/mL JESSICA Final     Oxacillin susceptible isolates are susceptible to cephalosporins (example: cefazolin and cephalexin) and beta lactam combination agents. Oxacillin resistant isolates are resistant to these agents.       Gentamicin Susceptible <=0.5 ug/mL JESSICA Final    Ciprofloxacin Susceptible <=0.5 ug/mL JESSICA Final    Levofloxacin Susceptible <=0.12 ug/mL JESSICA Final    Moxifloxacin  [*]  Susceptible <=0.25 ug/mL JESSICA Final    Inducible macrolide resistance test  [*]  Negative Negative ug/mL JESSICA Final    Erythromycin Resistant >=8 ug/mL JESSICA Final    Clindamycin Resistant >=4 ug/mL JESSICA Final    Linezolid  [*]  Susceptible 1 ug/mL JESSICA Final    Vancomycin Susceptible 1 ug/mL JESSICA Final    Daptomycin  "Susceptible 0.5 ug/mL JESSICA Final    Tetracycline Susceptible 2 ug/mL JESSICA Final    Doxycycline Susceptible 1 ug/mL JESSICA Final    Tigecycline  [*]  No interpretation available 0.25 ug/mL JESSICA Final    Nitrofurantoin  [*]  Susceptible <=16 ug/mL JESSICA Final    Rifampin  [*]  Susceptible <=0.5 ug/mL JESSICA Final      Susceptibility Comments       Antibiotics listed as \"No Interpretation\" have no regulatory guidelines for susceptibility/resistance available.                       [*]  Suppressed Antibiotic                   Verigene GP Panel [[id number]]  (Abnormal) Collected: 01/08/25 2049    Order Status: Completed Lab Status: Final result Updated: 01/09/25 1633    Specimen: Blood from Hand, Right      Staphylococcus aureus Not Detected     Staphylococcus epidermidis Detected     Comment: Positive for methicillin-resistant Staphylococcus epidermidis (MRSE) by Reds[employer] multiplex nucleic acid test. Final identification and antimicrobial susceptibility testing will be verified by standard methods.        Staphylococcus lugdunensis Not Detected     Enterococcus faecalis Not Detected     Enterococcus faecium Not Detected     Streptococcus species Not Detected     Streptococcus agalactiae Not Detected     Streptococcus anginosus group Not Detected     Streptococcus pneumoniae Not Detected     Streptococcus pyogenes Not Detected     Listeria species Not Detected    Narrative:      Specimen tested with [employer] multiplex, gram-positive blood culture nucleic acid test for the following targets: Staphylococcus aureus, Staphylococcus epidermidis, Staphylococcus lugdunensis, other Staphylococcus species, Enterococcus faecalis, Enterococcus faecium, Streptococcus species, Streptococcus agalactiae, Streptococcus anginosus group, Streptococcus pneumoniae, Streptococcus pyogenes, Listeria species, mecA (methicillin resistance), and Jhonatan/vanB (vancomycin resistance).    Blood Culture Hand, Left [[id number]]  (Normal) Collected: 01/08/25 " 2040    Order Status: Completed Lab Status: Preliminary result Updated: 01/12/25 2131    Specimen: Blood from Hand, Left      Culture No growth after 4 days            Recent Labs   Lab Test 12/31/24  1114 01/04/25  0842 01/09/25  1055   URINEPH 6.0 5.5 7.0   NITRITE Negative Negative Negative   LEUKEST Negative Negative Negative   WBCU 4 3 2                   Recent Labs   Lab Test 01/10/25  1145   CDBPCT Negative       Imaging: XR Chest Port 1 View    Result Date: 1/12/2025  EXAM: XR CHEST PORT 1 VIEW  1/12/2025 1:03 PM  HISTORY: RUE crepitus post chest tube removal COMPARISON: Same day CT chest, chest x-ray 1/11/2025 FINDINGS: Single view of the chest. Enteric tube courses below the diaphragm with tip out of the field of view. Spinal stimulator leads. Left atrial appendage occlusion device. Intact median sternotomy wires. Trachea is midline. Stable cardiac silhouette. Continued elevation of the right hemidiaphragm. Streaky perihilar left greater than right basilar opacities. No residual right apical pneumothorax is seen. No right pleural effusion. The left costophrenic angle is collimated out of the field-of-view. Similar subcutaneous emphysema along along the right chest wall extending into the right neck.     IMPRESSION: Ongoing extensive subcutaneous emphysema along the right chest wall extending into the right neck. No appreciable residual pneumothorax. I have personally reviewed the examination and initial interpretation and I agree with the findings. AMY LAU DO   SYSTEM ID:  [id number]    CT Chest w Contrast    Result Date: 1/12/2025  EXAMINATION: Chest CT  1/12/2025 9:10 AM CLINICAL HISTORY: Sternal wound infection. COMPARISON: CT chest abdomen and pelvis without contrast 1/4/2025, chest x-ray 1/11/2025 TECHNIQUE: CT imaging obtained through the chest without contrast. Axial, coronal, and sagittal reconstructions and axial MIP reformatted images are reviewed. FINDINGS: Lower neck: No suspicious thyroid  nodule. Extensive soft tissue air in the right neck, right chest wall, and right upper extremity extending superior to the field of view. Mediastinum and brit: No thoracic aortic aneurysm. Normal caliber main pulmonary artery. No central pulmonary embolism. Normal heart size. Surgical changes of CABG.  Small amount of retrosternal fluid which appears to communicate with the pericardium, with pericardial thickening or partial peripheral enhancement anteriorly. Status post median sternotomy. No thoracic lymphadenopathy. Left atrial appendage clipping. Airways: The central tracheobronchial tree is clear. Pleura: Small bilateral pleural effusions. Trace right pneumothorax. Lungs: Ill-defined groundglass opacity in the anterior right lower lobe Bilateral lower lobe atelectasis. Upper abdomen: Partially visualized feeding tube terminates in the proximal jejunum. Nodular hepatic capsular contour. Small volume ascites. Cholelithiasis. Soft tissues: Within normal limits. Bones: Status post median sternotomy. Stable nondisplaced bilateral anterolateral rib fractures. No aggressive osseous abnormality. Spinal stimulator leads in the low thoracic spine.     IMPRESSION: 1.  Status post median sternotomy for CABG. Increased retrosternal fluid status post mediastinal drain removal with mild peripheral hyperattenuation representing pericardial thickening or developing loculation. Superimposed infection is not excluded. 2.  Decreased, trace right sided pneumothorax. 3.  Small bilateral pleural effusions with adjacent atelectasis. 4.  Extensive soft tissue air involving the right chest wall, right upper extremity, and right neck. 5.  Cirrhotic appearance of the liver with small volume ascites. I have personally reviewed the examination and initial interpretation and I agree with the findings. ELTON LEE DO   SYSTEM ID:  [id number]    XR Chest Port 1 View    Result Date: 1/11/2025  EXAM: XR CHEST PORT 1 VIEW 1/11/2025 1:11 PM  INDICATION: post pleural tube removal COMPARISON: 0122 hours TECHNIQUE: Single AP view of the chest. FINDINGS: Interval removal of right pleural chest tube with residual small right apical pneumothorax. There is ongoing severe subcutaneous emphysema along the right chest wall. Left atrial appendage clipping. Enteric tube terminates below the diaphragm. Intact sternotomy wires. Trachea is midline.  Cardiac silhouette is normal in size.  Right hemidiaphragm elevation. No focal pulmonary opacities.  No pleural effusion.  Bones are unchanged.     IMPRESSION: 1. After removal of the right pleural chest tube, residual small right apical pneumothorax. There is still significant subcutaneous emphysema along the right chest wall. 2. Endotracheal tube is been removed. Additional support devices and surgical hardware is stable. I have personally reviewed the examination and initial interpretation and I agree with the findings. AMY LAU DO   SYSTEM ID:  [id number]    XR Abdomen Port 1 View    Result Date: 1/11/2025  EXAM: XR ABDOMEN PORT 1 VIEW, 1/11/2025 2:31 PM INDICATION: verify NJ placement post extubation. COMPARISON: 1/6/2025 FINDINGS: Enteric tube tip is likely in the distal duodenum. Stimulator device and lead projecting over the thoracic spine. Sternotomy wires and left atrial appendage clip. Air-filled, nondilated small bowel loops. Gas is seen within the colon and rectum.  No free intraperitoneal air, pneumatosis or portal venous gas. No abnormal calcifications. No acute or suspicious bone abnormalities. The visualized lung show atelectasis and possible small pleural effusions.      IMPRESSION: Enteric tube tip likely in the distal duodenum. AMY LAU DO   SYSTEM ID:  [id number]    XR Chest Port 1 View    Result Date: 1/11/2025  Exam: XR CHEST PORT 1 VIEW, 1/11/2025 1:54 AM Indication: R pneumothorax Comparison: 1/10/2025. Findings: Portable supine AP views of the chest. Patient is rotated. Endotracheal tube tip  projects over upper thoracic trachea. Gastric tube courses below the field of view. Interval removal of the right internal jugular central venous catheter. Stable positioning of the right apically oriented chest tube. Postoperative changes of the chest with intact median sternotomy wires, mediastinal surgical clips, and left atrial appendage clip. Trachea is midline. Cardiomediastinal silhouette is within normal limits. Decreased hazy opacification of the right upper lung. Similar streaky perihilar and bibasilar opacities. No appreciable pneumothorax. Mild blunting of the left costophrenic angle. Visualized portions of the upper abdomen are unremarkable. No acute osseous abnormality. Similar extensive subcutaneous emphysema tracking along the right chest wall and right neck.     Impression: 1. No appreciable pneumothorax. Stable right apical chest tube positioning. Evaluation limited by overlying subcutaneous emphysema and supine positioning. 2. Decreased hazy opacification of the right upper lung, favor atelectasis. 3. Similar streaky perihilar and bibasilar opacities, favor atelectasis and/or pulmonary edema. 4. Similar extensive subcutaneous emphysema along the right chest wall and right neck. 5. Possible small left pleural effusion. 6. Endotracheal tube tip projects over the upper thoracic trachea, may consider slight advancement followed by check chest x-ray. I have personally reviewed the examination and initial interpretation and I agree with the findings. MAIRA JOSE MD   SYSTEM ID:  [id number]    US Lower Extremity Venous Duplex Bilateral    Result Date: 1/10/2025  EXAMINATION: DOPPLER VENOUS ULTRASOUND OF BILATERAL LOWER EXTREMITIES, 1/10/2025 2:00 PM COMPARISON: Ultrasound 5/7/2024. HISTORY: Assess for DVT TECHNIQUE:  Gray-scale evaluation with compression, spectral flow and color Doppler assessment of the deep venous system of both legs from groin to knee, and then at the ankles. FINDINGS: In both  lower extremities, the common femoral, femoral, and popliteal veins demonstrate normal compressibility and blood flow. The left posterior tibial vein is fully compressible.     IMPRESSION: No evidence of deep venous thrombosis in either lower extremity. I have personally reviewed the examination and initial interpretation and I agree with the findings. ACACIA SIM MD   SYSTEM ID:  [id number]    XR Chest Port 1 View    Result Date: 1/10/2025  Exam: XR CHEST PORT 1 VIEW, 1/10/2025 1:36 AM Indication: R pneumothorax Comparison: 1/9/2025. Findings: Portable AP view of the chest. Patient is rotated. Endotracheal tube tip projects 5.8 cm above the edy. Gastric tube courses below the field of view. Right internal jugular central venous catheter tip is at the confluence of the brachiocephalic veins. Stable positioning of the right apically oriented chest tube. Epicardial stimulator leads. Postoperative changes of the chest with intact median sternotomy wires, mediastinal surgical clips, and left atrial appendage clip. Trachea is midline. Cardiomediastinal silhouette is within normal limits. Increased hazy opacification of the right upper lung. Similar streaky perihilar and bibasilar opacities. No appreciable pneumothorax or pleural effusion. Visualized portions of the upper abdomen are unremarkable. No acute osseous abnormality. Similar to slightly increased subcutaneous emphysema tracking along the right chest wall and right neck.     Impression: 1. No appreciable pneumothorax. Stable positioning of the right apically oriented chest tube. 2. Increased hazy opacification of the right upper lung, possibly positional versus atelectasis. 3. Similar to slightly increased subcutaneous emphysema along the right chest wall and right neck. 4. Similar streaky perihilar and basilar opacities, favor atelectasis and/or pulmonary edema. I have personally reviewed the examination and initial interpretation and I agree with the  findings. ACACIA SIM MD   SYSTEM ID:  [id number]    XR Chest Port 1 View    Result Date: 1/9/2025  Exam: XR CHEST PORT 1 VIEW, 1/9/2025 6:06 PM Indication: S/p mediastinal CT removal Comparison: Same day at 0054 hours Findings: Portable, frontal, semiupright view of the chest. Postsurgical changes of the chest. Intact midline sternotomy wires. Stable position of multiple support devices including the right IJ sheath, endotracheal tube, partially included feeding tube, epicardial leads, atrial appendage clip and a right-sided chest tube. The left basilar chest tube and mediastinal drain have been removed. Trachea is midline. Right-sided pneumothorax. No significant left-sided pneumothorax. Cardiomediastinal silhouette is stable. Streaky perihilar and bibasilar opacities, similar to prior. No significant pleural effusion. Increasing subcutaneous emphysema over the right chest and extending into the right lateral neck.     Impression: 1. New right pneumothorax. Right-sided chest tube remains in place. 2. Continued extensive subcutaneous emphysema over the right chest wall and extending into the right neck, increased from earlier same day. 3. Postsurgical changes of the chest. Remaining support devices are in stable position. Continued perihilar atelectasis/edema. [Result: Right-sided pneumothorax] Finding was identified on 1/9/2025 8:59 PM. Georges Prieto RN was contacted by Dr. Carmona at 1/9/2025 9:05 PM via [employer] and confirmed receipt. I have personally reviewed the examination and initial interpretation and I agree with the findings. ABIGAIL WELLER MD   SYSTEM ID:  [id number]    US Upper Extremity Venous Duplex Right    Result Date: 1/9/2025  EXAMINATION: DOPPLER VENOUS ULTRASOUND OF THE RIGHT UPPER EXTREMITY, 1/9/2025 11:38 AM COMPARISON: None. HISTORY: Right upper extremity swelling TECHNIQUE:  Gray-scale evaluation with compression, spectral flow and color Doppler assessment of the deep venous system of  the right upper extremity. FINDINGS: Right: The internal jugular, innominate, subclavian, and axillary veins are not visualized secondary to overlying subcutaneous emphysema. There is normal compressibility of the brachial, basilic and cephalic veins. Right upper arm subcutaneous edema.     IMPRESSION: 1.  No evidence of deep venous thrombosis in the right brachial vein. The internal jugular, innominate, subclavian, and axillary veins are not visualized secondary to overlying subcutaneous emphysema. 2.  Right upper arm subcutaneous edema. I have personally reviewed the examination and initial interpretation and I agree with the findings. ABIGAIL WELLER MD   SYSTEM ID:  [id number]    XR Chest Port 1 View    Result Date: 1/9/2025  Exam: XR CHEST PORT 1 VIEW, 1/9/2025 1:41 AM Indication: R pneumothorax Comparison: 1/8/2025. Findings: Portable semiupright AP view of the chest. Endotracheal tube tip projects 4.7 cm above the edy. Gastric tube courses below the field of view. Right internal jugular central venous catheter tip is at the confluence of the brachiocephalic veins. Stable positioning of mediastinal drains. Stable positioning of the right apically oriented chest tube and left basilar oriented chest tube. Epicardial stimulator leads. Postoperative changes of the chest with intact median sternotomy wires, mediastinal surgical clips, and left atrial appendage clip. Trachea is midline. Cardiomediastinal silhouette is within normal limits. Similar streaky perihilar and bibasilar opacities. No appreciable pneumothorax. Left costophrenic angle is below the field of view. Visualized portions of the upper abdomen are unremarkable. No acute osseous abnormality. Increased subcutaneous emphysema tracking along the superior right chest wall and new subcutaneous emphysema tracking along the bilateral neck..     Impression: 1. No significant pneumothorax. 2. Increased subcutaneous emphysema along the right chest wall and  bilateral neck. 3. Stable positioning of support devices. 4. Similar streaky perihilar and bibasilar opacities, favor atelectasis and/or pulmonary edema. I have personally reviewed the examination and initial interpretation and I agree with the findings. CORETTA DANIELSON MD   SYSTEM ID:  [id number]    XR Chest Port 1 View    Result Date: 1/8/2025  Exam: XR CHEST PORT 1 VIEW, 1/8/2025 2:51 PM Indication: CT on WS, review R sided PTX Comparison: Same-day chest radiograph 1/8/2025 13:13, additional priors Findings: An AP radiograph of the chest was obtained. The left costophrenic angle is collimated out of view. Endotracheal tube tip terminates approximately 5.7 cm above the edy, in appropriate position. Enteric tube courses past the diaphragm out of view. Right IJ central venous catheter tip terminates in the superior SVC. Stable position of right apical chest tube. Partial visualization of the left basilar chest tube with the tip not visualized. Postoperative changes of the chest including midline sternotomy and intact cerclage wires and clips projecting over mediastinum. Stable position of left atrial appendage clip. Stable right subcutaneous emphysema along the chest wall. The cardiomediastinal silhouette is unchanged. No visualized pleural effusion. No discernible pneumothorax. Stable perihilar and right basilar opacities favored to represent atelectasis and/or edema. No new focal consolidation. No acute osseous abnormality.     Impression: 1. No significant pneumothorax. 2. Stable position of support devices. No significant interval changes within the chest since same day radiograph. I have personally reviewed the examination and initial interpretation and I agree with the findings. AYLEEN WATSON MD   SYSTEM ID:  [id number]    XR Chest Port 1 View    Result Date: 1/8/2025  Exam: XR CHEST PORT 1 VIEW, 1/8/2025 1:27 AM Indication: R pneumothorax Comparison: 1/7/2025. Findings: Portable semiupright AP views  of the chest. Endotracheal tube tip projects 4.7 cm above the edy. Gastric tube courses below the field of view. Right internal jugular central venous catheter tip is at the confluence of the brachiocephalic veins. Stable positioning of mediastinal drains. Stable positioning of the right apically oriented chest tube and left basilar oriented chest tube. Epicardial stimulator leads. Postoperative changes of the chest with intact median sternotomy wires, mediastinal surgical clips, and left atrial appendage clip. Trachea is midline. Cardiomediastinal silhouette is within normal limits. Similar streaky perihilar and bibasilar opacities. No appreciable pneumothorax. Left costophrenic angle is below the field of view. Visualized portions of the upper abdomen are unremarkable. No acute osseous abnormality. Similar subcutaneous emphysema tracking along the superior right chest wall.     Impression: 1. No significant pneumothorax. Stable positioning of bilateral chest catheters. 2. Stable positioning of support devices. 3. Similar streaky perihilar and bibasilar opacities, favor atelectasis and/or pulmonary edema. 4. Similar subcutaneous emphysema tracking along the right chest wall. I have personally reviewed the examination and initial interpretation and I agree with the findings. MAGGIE PAULINO MD   SYSTEM ID:  [id number]    CT Head w/o Contrast    Result Date: 1/7/2025  EXAM: CT HEAD W/O CONTRAST  1/7/2025 5:27 PM HISTORY:  Prolonged encephalopathy despite weaning sedation   COMPARISON:  Head CT 1/3/2025. TECHNIQUE: Using multidetector thin collimation helical acquisition technique, axial, coronal and sagittal CT images from the skull base to the vertex were obtained without intravenous contrast.  (topogram) image(s) also obtained and reviewed. FINDINGS: Mildly motion degraded examination. No change in minimal subdural hemorrhage along the tentorium bilaterally. No new intracranial hemorrhage. No mass effect or  midline shift. No acute loss of gray-white matter differentiation in the cerebral hemispheres. Ventricles are proportionate to the cerebral sulci. Clear basal cisterns. Mild leukoaraiosis, unchanged. The bony calvaria and the bones of the skull base are normal. Minimal paranasal sinus mucosal thickening. The mastoid air cells are clear. Partially visualized enteric and endotracheal tubes. Grossly normal orbits.     IMPRESSION: Stable minimal subdural hemorrhage along the tentorium. No new hemorrhage. MARYLOU HOOKER MD   SYSTEM ID:  [id number]    XR Chest Port 1 View    Result Date: 1/7/2025  Exam: XR CHEST PORT 1 VIEW, 1/7/2025 1:51 AM Indication: R pneumothorax. Comparison: 1/6/2025. Findings: Portable semiupright AP views of the chest. Endotracheal tube tip projects 5.4 cm above the edy. Gastric tube courses below the field of view. Interval removal of the right internal jugular [place]-Bala catheter. New right internal jugular central venous catheter tip is at the confluence of the brachiocephalic veins. Stable positioning of mediastinal drains. Stable positioning of the right apically oriented chest tube and left basilar oriented chest tube. Epicardial stimulator leads. Postoperative changes of the chest with intact median sternotomy wires, mediastinal surgical clips, and left atrial appendage clip. Trachea is midline. Cardiomediastinal silhouette is within normal limits. Similar streaky perihilar and bibasilar opacities. No appreciable residual right pneumothorax. No large pleural effusion. Bilateral costophrenic angles were excluded from the field-of-view. Visualized portions of the upper abdomen are unremarkable. No acute osseous abnormality. Subcutaneous emphysema tracking along the superior right chest wall, increased since prior exam.     Impression: 1. No appreciable residual right pneumothorax. Stable positioning of bilateral chest tubes. 2. [place] catheter has been removed. Right internal jugular  central venous catheter with tip projecting over the confluence of the brachiocephalic veins. 3. Similar streaky perihilar and bibasilar opacities, favor pulmonary edema with superimposed atelectasis. 4. Similar subcutaneous emphysema tracking along the right chest wall, which has increased since prior exam. I have personally reviewed the examination and initial interpretation and I agree with the findings. MARYLOU ESPAÑA MD   SYSTEM ID:  [id number]

## 2025-01-13 NOTE — PROGRESS NOTES
CVTS Progress Note  01/13/2025         Assessment and Plan:     Erwin Aguilar is a 64 year old male with a past medical history of DM2, HTN, PVD, and neuropathic pain who initially presented on 12/31/24 to the ED with arm pain, vomiting, and diarrhea; ACS and ST depression. He subsequently had VT arrest with 1 round of CPR, epinephrine, defibrillation. After ROSC was found to be in afib with RVR. Became hypotensive and was subsequently cannulated for VA ECMO. Found to have multivessel disease s/p CABG x4 on 1/2/25 with Dr. Rosenbaum. ECMO de cannulated on 1/3/25 at bedside. ICU course complicated by large R ptx seen on chest CT on 1/4/25 s/p bedside CT placement. Extubated 1/11/25.       Cardiovascular:   Hx of CAD - s/p CABG x4 with Dr. Rosenbaum   Mixed Shock, cardiogenic, vasoplegic, resolved   HTN  Pre-op VT Cardiac Arrest on VA ECMO (01/01/24 - 1/3)  HLD  Pro-longed QTc  No arrhythmias overnight, HD stable, in NSR.   Post operative ECHO 1/5 with LVEF 55-60%, normal RV function, no pericardial effusion   -  mg daily  - Rosuvastatin 40 mg daily   - Continue Metoprolol 25 mg BID   - Diuresis as below  - QTc 536. Repeat EKG today     Chest tubes: Removed in ICU   TPW: Removed in ICU     Pulmonary:  Acute Hypoxic Respiratory failure, resolved   Respiratory alkalosis 2:2 agitation - resolved   R tension pneumothorax, resolved   VAP  SubQ emphysema   Extubated POD 9 to 4 lpm via NC. Now saturating well on RA.  - Supplemental O2 PRN to keep sats > 92%. Wean off as tolerated.  - Pulm hygiene, IS, activity and deep breathing  - ABX detailed below   - continue BID Mucomyst and Duoneb, consider making PRN as cough resolves/secretions thin     Neurology / MSK:  Acute post-operative pain, improved  Encephalopathy, improved   Delirium   Anxiety   Chronic pain with opioid dependence  Neuropathy, PTA  Insomnia   Pain well controlled with current regimen:  - Acetaminophen  - 1/1 vEEG without seizure activity (severe  encephalopathy) and 12/31, 1/4, and 1/7 CTH negative for acute findings.   - PTA Sertraline  - PTA PRN hydroxyzine   - PO oxycodone PRN  - PTA baclofen   - Melatonin qHS        / Renal / Fluid / Electrolytes:  Hypernatremia, resolved   AUSTIN/ATN, resolved  Hypervolemia   Urinary retention   Hypokalemia   BL creat ~ 0.6-0.8, most recent creatinine 0.49; adequate UOP.   FLUID STATUS: Pre-op weight 167 lbs, weight today 196 lbs; I/O past 24 hours: -2.7 L.   - Diuresis: 40 mg of IV lasix, goal net negative 1 L   - 40 meq of potassium   - Repeat BMP 1400  - Replete lytes per protocol  - Strict I/O, daily weights  - Avoid/limit nephrotoxins as able    GI / Nutrition:   Dysphagia   At risk for malnutrition   Liquid stools   - SLP following  Orders Placed This Encounter      Full Liquid Diet  - TF @ goal via [place]   - Continue bowel regimen, last BM 1/13/25   - 995 ml of stool, will add PRN imodium 2 mg QID PRN     Endocrine:  Stress induced hyperglycemia   Type 2 Diabetes Mellitus   Hgb A1c 5.7  - Initially managed on insulin drip postop, transitioned to sliding scale  - goal BG <180 for optimal healing  - Lantus increased to PTA dosing 26 units daily     Infectious Disease:  Stress induced leukocytosis  Sternal wound infection  Aspiration PNA: Klebsiella pneumoniae   WBC 12.3, remains afebrile, no signs or symptoms of infection  - Completed perioperative antibiotics  - Continue to monitor fever curve, CBC  - Klebsiella pneumoniae from sputum cultures on 12/31 and 1/9.   - Zosyn and vancomycin were restarted 1/9 for fever. Sputum culture again grew Klebsiella pneumoniae.   - 1/11 noted to have small puss drainage from sternotomy    - Continue Vanc/Zosyn per Dr. Rosenbaum, tentative plan for 7-10 course.    - CT chest on 1/12/25, not concerning for deep infection.     Hematology:   Acute blood loss anemia and thrombocytopenia  Hgb 8.7; Plt 425, no signs or symptoms of active bleeding  - CBC daily    Anticoagulation:   -  mg      MSK/Skin:  Sternotomy  Surgical incision  - Sternal precautions  - Incisional cares per protocol    Prophylaxis:   - Stress ulcer prophylaxis: Pantoprazole 40 mg daily for 30 days  - DVT prophylaxis: Subcutaneous heparin, SCD    Disposition:   - Transferred to  on pending, transfer orders placed 1/12/25   - Therapies recommending discharge to TCU    Discussed with Dr. Rosenbaum.      Antonio Hernandez DNP APRN CNP CCRN   Critical Care   Available on [employer]ce[employer] or Secure Chat   Pager [phone]          Interval History:     No overnight events.  States pain is well managed on current regimen. Slept well overnight.  Tolerating diet and tube feeds, is passing flatus, + BM.   No nausea or vomiting.  Breathing well without complaints. Continues with wet sounding cough.   Working with therapies.   Denies chest pain, palpitations, dizziness, syncopal symptoms, fevers, chills, myalgias, or sternal popping/clicking.         Physical Exam:     Gen: A&Ox4, NAD  Neuro: no focal deficits   CV: RRR, normal S1 S2, no murmurs, rubs or gallops.    Pulm: CTA, no wheezing or rhonchi, normal breathing on RA. + subcutaneous emphysema on RUE.   Abd: nondistended, normal BS, soft, nontender  Ext: 2+ peripheral edema, improved from previous exam.   Incision: clean, dry, intact, no erythema, sternum stable  Tubes/drain sites: clean, dry, intact, no erythema         Data:    Imaging:  reviewed recent imaging, no acute concerns    Recent Results (from the past 24 hours)   XR Chest Port 1 View    Narrative    EXAM: XR CHEST PORT 1 VIEW  1/12/2025 1:03 PM      HISTORY: RUE crepitus post chest tube removal    COMPARISON: Same day CT chest, chest x-ray 1/11/2025    FINDINGS:   Single view of the chest. Enteric tube courses below the diaphragm  with tip out of the field of view. Spinal stimulator leads. Left  atrial appendage occlusion device. Intact median sternotomy wires.  Trachea is midline. Stable cardiac silhouette. Continued elevation of  the  right hemidiaphragm. Streaky perihilar left greater than right  basilar opacities. No residual right apical pneumothorax is seen. No  right pleural effusion. The left costophrenic angle is collimated out  of the field-of-view. Similar subcutaneous emphysema along along the  right chest wall extending into the right neck.      Impression    IMPRESSION:   Ongoing extensive subcutaneous emphysema along the right chest wall  extending into the right neck. No appreciable residual pneumothorax.    I have personally reviewed the examination and initial interpretation  and I agree with the findings.    AMY LAU DO         SYSTEM ID:  [id number]        Labs:  Recent Labs   Lab 01/13/25  0836 01/13/25  0425 01/13/25  0350 01/13/25  0001 01/12/25  2145 01/12/25  2052 01/12/25  1617 01/12/25  0814 01/12/25  0604 01/11/25  0424 01/11/25  0412   WBC  --  12.3*  --   --   --   --   --   --  12.9*  --  10.2   HGB  --  8.7*  --   --   --   --   --   --  8.6*  --  7.5*   MCV  --  95  --   --   --   --   --   --  92  --  93   PLT  --  425  --   --   --   --   --   --  394  --  290   NA  --  142  --   --  143  --  142  --  142   < > 141   POTASSIUM  --  3.0*  --   --  3.1*  3.1*  --  3.8  3.8   < > 3.0*  3.0*   < > 2.9*  2.9*   CHLORIDE  --  108*  --   --  110*  --  108*  --  109*   < > 110*   CO2  --  23  --   --  23  --  22  --  22   < > 22   BUN  --  19.5  --   --  21.2  --  18.7  --  18.2   < > 19.0   CR  --  0.49*  --   --  0.57*  --  0.57*  --  0.52*   < > 0.63*   ANIONGAP  --  11  --   --  10  --  12  --  11   < > 9   [place]  --  8.0*  --   --  7.9*  --  8.1*  --  7.9*   < > 7.4*   * 179* 166*   < > 154*   < > 172*   < > 176*   < > 176*    < > = values in this interval not displayed.      GLUCOSE:   Recent Labs   Lab 01/13/25  0836 01/13/25  0425 01/13/25  0350 01/13/25  0001 01/12/25  2145 01/12/25 2052   * 179* 166* 163* 154* 157*

## 2025-01-13 NOTE — PROGRESS NOTES
Major Shift Events:  Up to chair    Neuro: Alert- intermittenly confused- bit of sunduners?    CV: SR, BP stable    Respi: LS coarse, on RA, Crepitus remains in RUE into R side of chest/neck    Gi: Lots of BM, RT in place, TF at goal, okay for clears with 1:1    Gu: Rodriguez in place with large output- pt diuresed x1 today     Chasing K+ all day- multiple replacements    Plan: Transfer to floor, delirium prevention protocol   For vital signs and complete assessments, please see documentation flowsheets.

## 2025-01-14 ENCOUNTER — APPOINTMENT (OUTPATIENT)
Dept: ULTRASOUND IMAGING | Facility: CLINIC | Age: 65
DRG: 003 | End: 2025-01-14
Attending: SURGERY
Payer: MEDICARE

## 2025-01-14 LAB
ALBUMIN UR-MCNC: NEGATIVE MG/DL
ANION GAP SERPL CALCULATED.3IONS-SCNC: 12 MMOL/L (ref 7–15)
APPEARANCE UR: CLEAR
ATRIAL RATE - MUSE: 77 BPM
BACTERIA BLD CULT: NO GROWTH
BACTERIA BLD CULT: NO GROWTH
BILIRUB UR QL STRIP: NEGATIVE
BUN SERPL-MCNC: 19.2 MG/DL (ref 8–23)
CALCIUM SERPL-MCNC: 8.5 MG/DL (ref 8.8–10.4)
CHLORIDE SERPL-SCNC: 108 MMOL/L (ref 98–107)
COLOR UR AUTO: NORMAL
CREAT SERPL-MCNC: 0.45 MG/DL (ref 0.67–1.17)
DIASTOLIC BLOOD PRESSURE - MUSE: NORMAL MMHG
EGFRCR SERPLBLD CKD-EPI 2021: >90 ML/MIN/1.73M2
ERYTHROCYTE [DISTWIDTH] IN BLOOD BY AUTOMATED COUNT: 17.4 % (ref 10–15)
GLUCOSE BLDC GLUCOMTR-MCNC: 133 MG/DL (ref 70–99)
GLUCOSE BLDC GLUCOMTR-MCNC: 145 MG/DL (ref 70–99)
GLUCOSE BLDC GLUCOMTR-MCNC: 149 MG/DL (ref 70–99)
GLUCOSE BLDC GLUCOMTR-MCNC: 151 MG/DL (ref 70–99)
GLUCOSE BLDC GLUCOMTR-MCNC: 158 MG/DL (ref 70–99)
GLUCOSE BLDC GLUCOMTR-MCNC: 163 MG/DL (ref 70–99)
GLUCOSE BLDC GLUCOMTR-MCNC: 177 MG/DL (ref 70–99)
GLUCOSE SERPL-MCNC: 166 MG/DL (ref 70–99)
GLUCOSE UR STRIP-MCNC: NEGATIVE MG/DL
HCO3 SERPL-SCNC: 21 MMOL/L (ref 22–29)
HCT VFR BLD AUTO: 27.4 % (ref 40–53)
HGB BLD-MCNC: 9 G/DL (ref 13.3–17.7)
HGB UR QL STRIP: NEGATIVE
INTERPRETATION ECG - MUSE: NORMAL
KETONES UR STRIP-MCNC: NEGATIVE MG/DL
LEUKOCYTE ESTERASE UR QL STRIP: NEGATIVE
MAGNESIUM SERPL-MCNC: 1.7 MG/DL (ref 1.7–2.3)
MCH RBC QN AUTO: 30 PG (ref 26.5–33)
MCHC RBC AUTO-ENTMCNC: 32.8 G/DL (ref 31.5–36.5)
MCV RBC AUTO: 91 FL (ref 78–100)
NITRATE UR QL: NEGATIVE
P AXIS - MUSE: 29 DEGREES
PH UR STRIP: 7 [PH] (ref 5–7)
PHOSPHATE SERPL-MCNC: 2.7 MG/DL (ref 2.5–4.5)
PLATELET # BLD AUTO: 461 10E3/UL (ref 150–450)
POTASSIUM SERPL-SCNC: 3.3 MMOL/L (ref 3.4–5.3)
POTASSIUM SERPL-SCNC: 3.5 MMOL/L (ref 3.4–5.3)
PR INTERVAL - MUSE: 140 MS
QRS DURATION - MUSE: 82 MS
QT - MUSE: 464 MS
QTC - MUSE: 525 MS
R AXIS - MUSE: -10 DEGREES
RBC # BLD AUTO: 3 10E6/UL (ref 4.4–5.9)
SODIUM SERPL-SCNC: 141 MMOL/L (ref 135–145)
SP GR UR STRIP: 1.01 (ref 1–1.03)
SYSTOLIC BLOOD PRESSURE - MUSE: NORMAL MMHG
T AXIS - MUSE: 156 DEGREES
UROBILINOGEN UR STRIP-MCNC: NORMAL MG/DL
VANCOMYCIN SERPL-MCNC: 10.1 UG/ML
VENTRICULAR RATE- MUSE: 77 BPM
WBC # BLD AUTO: 15.3 10E3/UL (ref 4–11)

## 2025-01-14 PROCEDURE — 250N000013 HC RX MED GY IP 250 OP 250 PS 637: Performed by: NURSE PRACTITIONER

## 2025-01-14 PROCEDURE — 250N000011 HC RX IP 250 OP 636: Performed by: SURGERY

## 2025-01-14 PROCEDURE — 250N000011 HC RX IP 250 OP 636: Performed by: NURSE PRACTITIONER

## 2025-01-14 PROCEDURE — 94640 AIRWAY INHALATION TREATMENT: CPT

## 2025-01-14 PROCEDURE — 250N000013 HC RX MED GY IP 250 OP 250 PS 637: Performed by: PHYSICIAN ASSISTANT

## 2025-01-14 PROCEDURE — 120N000003 HC R&B IMCU UMMC

## 2025-01-14 PROCEDURE — 250N000013 HC RX MED GY IP 250 OP 250 PS 637

## 2025-01-14 PROCEDURE — 93970 EXTREMITY STUDY: CPT

## 2025-01-14 PROCEDURE — 250N000013 HC RX MED GY IP 250 OP 250 PS 637: Performed by: STUDENT IN AN ORGANIZED HEALTH CARE EDUCATION/TRAINING PROGRAM

## 2025-01-14 PROCEDURE — 84100 ASSAY OF PHOSPHORUS: CPT | Performed by: STUDENT IN AN ORGANIZED HEALTH CARE EDUCATION/TRAINING PROGRAM

## 2025-01-14 PROCEDURE — 999N000157 HC STATISTIC RCP TIME EA 10 MIN

## 2025-01-14 PROCEDURE — 80202 ASSAY OF VANCOMYCIN: CPT | Performed by: STUDENT IN AN ORGANIZED HEALTH CARE EDUCATION/TRAINING PROGRAM

## 2025-01-14 PROCEDURE — 36415 COLL VENOUS BLD VENIPUNCTURE: CPT

## 2025-01-14 PROCEDURE — 83735 ASSAY OF MAGNESIUM: CPT | Performed by: STUDENT IN AN ORGANIZED HEALTH CARE EDUCATION/TRAINING PROGRAM

## 2025-01-14 PROCEDURE — 84132 ASSAY OF SERUM POTASSIUM: CPT | Performed by: STUDENT IN AN ORGANIZED HEALTH CARE EDUCATION/TRAINING PROGRAM

## 2025-01-14 PROCEDURE — 81003 URINALYSIS AUTO W/O SCOPE: CPT | Performed by: SURGERY

## 2025-01-14 PROCEDURE — 250N000009 HC RX 250: Performed by: STUDENT IN AN ORGANIZED HEALTH CARE EDUCATION/TRAINING PROGRAM

## 2025-01-14 PROCEDURE — 93970 EXTREMITY STUDY: CPT | Mod: 26 | Performed by: RADIOLOGY

## 2025-01-14 PROCEDURE — 80048 BASIC METABOLIC PNL TOTAL CA: CPT

## 2025-01-14 PROCEDURE — 85027 COMPLETE CBC AUTOMATED: CPT

## 2025-01-14 PROCEDURE — 999N000128 HC STATISTIC PERIPHERAL IV START W/O US GUIDANCE

## 2025-01-14 PROCEDURE — 250N000013 HC RX MED GY IP 250 OP 250 PS 637: Performed by: SURGERY

## 2025-01-14 PROCEDURE — 250N000011 HC RX IP 250 OP 636: Performed by: PHYSICIAN ASSISTANT

## 2025-01-14 PROCEDURE — 36415 COLL VENOUS BLD VENIPUNCTURE: CPT | Performed by: SURGERY

## 2025-01-14 PROCEDURE — 250N000011 HC RX IP 250 OP 636: Performed by: STUDENT IN AN ORGANIZED HEALTH CARE EDUCATION/TRAINING PROGRAM

## 2025-01-14 PROCEDURE — 87040 BLOOD CULTURE FOR BACTERIA: CPT | Performed by: SURGERY

## 2025-01-14 PROCEDURE — 36415 COLL VENOUS BLD VENIPUNCTURE: CPT | Performed by: STUDENT IN AN ORGANIZED HEALTH CARE EDUCATION/TRAINING PROGRAM

## 2025-01-14 RX ORDER — HYDRALAZINE HYDROCHLORIDE 20 MG/ML
10 INJECTION INTRAMUSCULAR; INTRAVENOUS EVERY 6 HOURS PRN
Status: DISCONTINUED | OUTPATIENT
Start: 2025-01-14 | End: 2025-01-18

## 2025-01-14 RX ORDER — POTASSIUM CHLORIDE 750 MG/1
40 TABLET, EXTENDED RELEASE ORAL ONCE
Status: COMPLETED | OUTPATIENT
Start: 2025-01-14 | End: 2025-01-14

## 2025-01-14 RX ORDER — OLANZAPINE 5 MG/1
5 TABLET, ORALLY DISINTEGRATING ORAL AT BEDTIME
Status: DISCONTINUED | OUTPATIENT
Start: 2025-01-14 | End: 2025-01-16

## 2025-01-14 RX ORDER — LIDOCAINE 40 MG/G
CREAM TOPICAL
Status: ACTIVE | OUTPATIENT
Start: 2025-01-14 | End: 2025-01-17

## 2025-01-14 RX ORDER — LOSARTAN POTASSIUM 25 MG/1
25 TABLET ORAL DAILY
Status: DISCONTINUED | OUTPATIENT
Start: 2025-01-14 | End: 2025-01-25 | Stop reason: HOSPADM

## 2025-01-14 RX ORDER — POTASSIUM CHLORIDE 750 MG/1
40 TABLET, EXTENDED RELEASE ORAL DAILY
Status: DISCONTINUED | OUTPATIENT
Start: 2025-01-14 | End: 2025-01-14

## 2025-01-14 RX ORDER — ACETYLCYSTEINE 200 MG/ML
2 SOLUTION ORAL; RESPIRATORY (INHALATION) EVERY 6 HOURS PRN
Status: DISCONTINUED | OUTPATIENT
Start: 2025-01-14 | End: 2025-01-23

## 2025-01-14 RX ORDER — POTASSIUM CHLORIDE 1.5 G/1.58G
60 POWDER, FOR SOLUTION ORAL 2 TIMES DAILY
Status: COMPLETED | OUTPATIENT
Start: 2025-01-14 | End: 2025-01-16

## 2025-01-14 RX ORDER — IPRATROPIUM BROMIDE AND ALBUTEROL SULFATE 2.5; .5 MG/3ML; MG/3ML
3 SOLUTION RESPIRATORY (INHALATION) EVERY 4 HOURS PRN
Status: DISCONTINUED | OUTPATIENT
Start: 2025-01-14 | End: 2025-01-23

## 2025-01-14 RX ORDER — FUROSEMIDE 10 MG/ML
60 INJECTION INTRAMUSCULAR; INTRAVENOUS
Status: DISCONTINUED | OUTPATIENT
Start: 2025-01-14 | End: 2025-01-16

## 2025-01-14 RX ORDER — ASPIRIN 81 MG/1
81 TABLET, CHEWABLE ORAL DAILY
Status: DISCONTINUED | OUTPATIENT
Start: 2025-01-15 | End: 2025-01-25 | Stop reason: HOSPADM

## 2025-01-14 RX ORDER — POTASSIUM CHLORIDE 1.5 G/1.58G
20 POWDER, FOR SOLUTION ORAL ONCE
Status: COMPLETED | OUTPATIENT
Start: 2025-01-14 | End: 2025-01-14

## 2025-01-14 RX ORDER — MAGNESIUM OXIDE 400 MG/1
400 TABLET ORAL EVERY 4 HOURS
Status: COMPLETED | OUTPATIENT
Start: 2025-01-14 | End: 2025-01-14

## 2025-01-14 RX ADMIN — OXYCODONE HYDROCHLORIDE 5 MG: 5 TABLET ORAL at 03:14

## 2025-01-14 RX ADMIN — INSULIN ASPART 1 UNITS: 100 INJECTION, SOLUTION INTRAVENOUS; SUBCUTANEOUS at 20:40

## 2025-01-14 RX ADMIN — HYDRALAZINE HYDROCHLORIDE 10 MG: 20 INJECTION INTRAMUSCULAR; INTRAVENOUS at 03:13

## 2025-01-14 RX ADMIN — ACETYLCYSTEINE 2 ML: 200 SOLUTION ORAL; RESPIRATORY (INHALATION) at 08:53

## 2025-01-14 RX ADMIN — ROSUVASTATIN CALCIUM 40 MG: 20 TABLET, FILM COATED ORAL at 08:13

## 2025-01-14 RX ADMIN — ACETAMINOPHEN 650 MG: 325 TABLET, FILM COATED ORAL at 03:14

## 2025-01-14 RX ADMIN — BACLOFEN 10 MG: 10 TABLET ORAL at 08:12

## 2025-01-14 RX ADMIN — METOPROLOL TARTRATE 25 MG: 25 TABLET, FILM COATED ORAL at 08:12

## 2025-01-14 RX ADMIN — HYDROXYZINE HYDROCHLORIDE 50 MG: 50 TABLET ORAL at 20:23

## 2025-01-14 RX ADMIN — PIPERACILLIN SODIUM AND TAZOBACTAM SODIUM 3.38 G: 3; .375 INJECTION, SOLUTION INTRAVENOUS at 15:34

## 2025-01-14 RX ADMIN — OLANZAPINE 5 MG: 5 TABLET, ORALLY DISINTEGRATING ORAL at 05:27

## 2025-01-14 RX ADMIN — Medication 200 MG: at 12:06

## 2025-01-14 RX ADMIN — INSULIN ASPART 2 UNITS: 100 INJECTION, SOLUTION INTRAVENOUS; SUBCUTANEOUS at 08:13

## 2025-01-14 RX ADMIN — LOPERAMIDE HYDROCHLORIDE 2 MG: 2 CAPSULE ORAL at 08:12

## 2025-01-14 RX ADMIN — LOSARTAN POTASSIUM 25 MG: 25 TABLET, FILM COATED ORAL at 10:35

## 2025-01-14 RX ADMIN — BACLOFEN 10 MG: 10 TABLET ORAL at 23:46

## 2025-01-14 RX ADMIN — POTASSIUM CHLORIDE 40 MEQ: 1.5 POWDER, FOR SOLUTION ORAL at 08:12

## 2025-01-14 RX ADMIN — INSULIN ASPART 1 UNITS: 100 INJECTION, SOLUTION INTRAVENOUS; SUBCUTANEOUS at 17:25

## 2025-01-14 RX ADMIN — HYDROXYZINE HYDROCHLORIDE 50 MG: 50 TABLET ORAL at 14:32

## 2025-01-14 RX ADMIN — HYDROXYZINE HYDROCHLORIDE 50 MG: 50 TABLET ORAL at 02:43

## 2025-01-14 RX ADMIN — FUROSEMIDE 60 MG: 10 INJECTION, SOLUTION INTRAMUSCULAR; INTRAVENOUS at 15:35

## 2025-01-14 RX ADMIN — OLANZAPINE 5 MG: 5 TABLET, ORALLY DISINTEGRATING ORAL at 22:46

## 2025-01-14 RX ADMIN — MAGNESIUM OXIDE TAB 400 MG (241.3 MG ELEMENTAL MG) 400 MG: 400 (241.3 MG) TAB at 11:24

## 2025-01-14 RX ADMIN — APIXABAN 5 MG: 5 TABLET, FILM COATED ORAL at 20:23

## 2025-01-14 RX ADMIN — MAGNESIUM OXIDE TAB 400 MG (241.3 MG ELEMENTAL MG) 400 MG: 400 (241.3 MG) TAB at 08:12

## 2025-01-14 RX ADMIN — PIPERACILLIN SODIUM AND TAZOBACTAM SODIUM 3.38 G: 3; .375 INJECTION, SOLUTION INTRAVENOUS at 10:35

## 2025-01-14 RX ADMIN — PIPERACILLIN SODIUM AND TAZOBACTAM SODIUM 3.38 G: 3; .375 INJECTION, SOLUTION INTRAVENOUS at 22:51

## 2025-01-14 RX ADMIN — Medication 1250 MG: at 11:57

## 2025-01-14 RX ADMIN — Medication 5 MG: at 18:35

## 2025-01-14 RX ADMIN — FUROSEMIDE 60 MG: 10 INJECTION, SOLUTION INTRAMUSCULAR; INTRAVENOUS at 10:36

## 2025-01-14 RX ADMIN — PIPERACILLIN SODIUM AND TAZOBACTAM SODIUM 3.38 G: 3; .375 INJECTION, SOLUTION INTRAVENOUS at 00:31

## 2025-01-14 RX ADMIN — POTASSIUM CHLORIDE 40 MEQ: 750 TABLET, EXTENDED RELEASE ORAL at 08:12

## 2025-01-14 RX ADMIN — THERA TABS 1 TABLET: TAB at 08:12

## 2025-01-14 RX ADMIN — INSULIN ASPART 1 UNITS: 100 INJECTION, SOLUTION INTRAVENOUS; SUBCUTANEOUS at 23:46

## 2025-01-14 RX ADMIN — HEPARIN SODIUM 5000 UNITS: 5000 INJECTION, SOLUTION INTRAVENOUS; SUBCUTANEOUS at 00:31

## 2025-01-14 RX ADMIN — ACETAMINOPHEN 650 MG: 325 TABLET, FILM COATED ORAL at 23:46

## 2025-01-14 RX ADMIN — POTASSIUM CHLORIDE 20 MEQ: 1.5 POWDER, FOR SOLUTION ORAL at 14:32

## 2025-01-14 RX ADMIN — Medication 37.5 MG: at 20:23

## 2025-01-14 RX ADMIN — SERTRALINE HYDROCHLORIDE 200 MG: 100 TABLET ORAL at 08:12

## 2025-01-14 RX ADMIN — ASPIRIN 81 MG CHEWABLE TABLET 162 MG: 81 TABLET CHEWABLE at 08:12

## 2025-01-14 RX ADMIN — INSULIN ASPART 1 UNITS: 100 INJECTION, SOLUTION INTRAVENOUS; SUBCUTANEOUS at 12:05

## 2025-01-14 RX ADMIN — Medication 60 ML: at 20:24

## 2025-01-14 RX ADMIN — INSULIN ASPART 1 UNITS: 100 INJECTION, SOLUTION INTRAVENOUS; SUBCUTANEOUS at 03:22

## 2025-01-14 RX ADMIN — Medication 60 ML: at 08:15

## 2025-01-14 RX ADMIN — LOPERAMIDE HYDROCHLORIDE 2 MG: 2 CAPSULE ORAL at 22:46

## 2025-01-14 RX ADMIN — CALCIUM CARBONATE (ANTACID) CHEW TAB 500 MG 1000 MG: 500 CHEW TAB at 08:12

## 2025-01-14 RX ADMIN — IPRATROPIUM BROMIDE AND ALBUTEROL SULFATE 3 ML: .5; 3 SOLUTION RESPIRATORY (INHALATION) at 08:53

## 2025-01-14 RX ADMIN — POTASSIUM CHLORIDE 60 MEQ: 1.5 POWDER, FOR SOLUTION ORAL at 20:24

## 2025-01-14 RX ADMIN — Medication 1250 MG: at 23:46

## 2025-01-14 RX ADMIN — CALCIUM CARBONATE (ANTACID) CHEW TAB 500 MG 1000 MG: 500 CHEW TAB at 15:34

## 2025-01-14 RX ADMIN — HEPARIN SODIUM 5000 UNITS: 5000 INJECTION, SOLUTION INTRAVENOUS; SUBCUTANEOUS at 08:15

## 2025-01-14 RX ADMIN — BACLOFEN 10 MG: 10 TABLET ORAL at 15:34

## 2025-01-14 RX ADMIN — BACLOFEN 10 MG: 10 TABLET ORAL at 00:31

## 2025-01-14 ASSESSMENT — ACTIVITIES OF DAILY LIVING (ADL)
ADLS_ACUITY_SCORE: 64
ADLS_ACUITY_SCORE: 54
ADLS_ACUITY_SCORE: 64
ADLS_ACUITY_SCORE: 54
ADLS_ACUITY_SCORE: 64
ADLS_ACUITY_SCORE: 54
ADLS_ACUITY_SCORE: 54
ADLS_ACUITY_SCORE: 64
ADLS_ACUITY_SCORE: 54
ADLS_ACUITY_SCORE: 64
ADLS_ACUITY_SCORE: 54
ADLS_ACUITY_SCORE: 54

## 2025-01-14 NOTE — PROGRESS NOTES
CLINICAL NUTRITION SERVICES - BRIEF NOTE    EMR review for enteral nutrition monitoring. Labs/meds reviewed. Stool output noted. Receiving imodium PRN. Per SLP, diet now progressed to pureed but pt remains with poor appetite.     INTERVENTIONS  Recommendations / Nutrition Prescription  Vital 1.5 Lalito @ goal of 60ml/hr (1440ml/day) + 2 pkt Prosource TF20 will provide: 2320 kcals (29 kcal/kg), 137 g PRO (1.7 g/kg), 1100 ml free H20, 269 g CHO, and 8 g fiber daily.   Trial banatrol TF BID for soluble fiber to assist with loose stools(90 kcals, 10 g fiber)    As desired, could consider cycling feeds to promote PO efforts now that pt advanced to pureed diet. Rec Vital 1.5 Lalito @ goal of  60ml/hr x18 hours + 1 pkt Prosource TF20 (1080ml/day)  will provide: 98113 kcals, 92 g PRO, 825 ml free H20, 201 g CHO, and 6 g fiber daily. Meets ~ 75% of estimated nutrition needs. Can further reduce as appropriate pending improvements in PO intake.      Implementation  Enteral Nutrition - monitoring    Linda Harley MS, RD, LD, CNSC    6C (beds 9063-5795) + 7C (beds 4835-1753) + ED + Obs  [facility] by name or unit dietitian

## 2025-01-14 NOTE — PLAN OF CARE
[id number] : A&Ox2-3, can sometimes remember he is in [facility] but otherwise thinks he's in [place]. Intermittently remembers date. Unable to recall reason for hospitalization. Had some auditory hallucination thinking his wife, Isabella was calling out to him. Did not endorse any visual hallucination. Overestimates abilities and tries to get out of bed to void. Reoriented frequently. Sitter kept at bedside for now. Rectal tube in place. Primofit changed x2. Voided adequate amounts. Refused to eat anything for meals, says he is very picky and does not like any of our food. Spoke to wife, Isabella, made aware that patient is on pureed/thins and encouraged to bring some food that may appeal to patient. Previous right leg surgical site reviewed by team, some hematoma evacuation done. Dressing done by CVTS. US BLE done - no DVT noted. R PIV leaked, vascular had a difficult time reinserting PIV and advised to insert a PICC if patient will be here for an extended period of time. Unable to send out sputum sample. Intermittently refuses repositioning.     For detailed assessments, please refer to flowsheets.    Plan:   Wean off 1:1 as able depending on patient's behavior. Monitor and inform team with any changes.     Problem: Risk for Delirium  Goal: Optimal Coping  Outcome: Not Progressing  Intervention: Optimize Psychosocial Adjustment to Delirium  Recent Flowsheet Documentation  Taken 1/14/2025 0819 by Korina Echols, RN  Supportive Measures:   active listening utilized   positive reinforcement provided  Family/Support System Care: support provided     Problem: Risk for Delirium  Goal: Improved Behavioral Control  Outcome: Not Progressing  Intervention: Minimize Safety Risk  Recent Flowsheet Documentation  Taken 1/14/2025 0819 by Korina Echols, RN  Enhanced Safety Measures:  at bedside  Trust Relationship/Rapport:   care explained   questions answered     Problem: Risk for Delirium  Goal: Improved  Attention and Thought Clarity  Outcome: Not Progressing  Intervention: Maximize Cognitive Function  Recent Flowsheet Documentation  Taken 1/14/2025 0819 by Korina Echols, RN  Sensory Stimulation Regulation:   care clustered   lighting decreased  Reorientation Measures:   clock in view   calendar in view   Goal Outcome Evaluation:      Plan of Care Reviewed With: patient    Overall Patient Progress: no changeOverall Patient Progress: no change    Outcome Evaluation: Confused, irritable, and difficult to reorient. Remains with sitter at bedside.

## 2025-01-14 NOTE — PLAN OF CARE
"NURSING PROGRESS NOTE  Shift Summary  [id number]  Date: January 14, 2025     Neuro/Musculoskeletal: A&Ox4. Restless and anxious. Sitter @bedside.  Cardiac: SR, HR 80s. SBP <140 goal. PRN hydralazine x1 for SBP >140.  Respiratory: Sating in the 90s on RA.  GI/: Adequate urine output. Rectal tube remains in place with moderate output overnight.  Diet/Appetite: Tolerating pureed diet and thin liquid. Continuous TFs @ 60 mL/hr + 30 mL FWF Q4H via NJ.   Activity:  Assist x2 with lift,   Pain: Back pain, PRN tylenol and  oxy x1.  Skin: No new deficits noted. Left leg primapore dressing had a copious sanguinous drainage, changed dressing.  LDAs + Drips/IVF: RPIV-SL.  Protocols/Labs: Mag, K+ and phos.    Pertinent Shift Updates: Pt did not sleep overnight and was feeling anxious, PRN atarax given with no improvement. Provider notified and ordered Zyprexa, med given.    Plan: Continue POC and report changes to the team.    Dawn Jane RN   [facility] ([facility]): [facility] ICU (Unit 6C)      Problem: Adult Inpatient Plan of Care  Goal: Plan of Care Review  Description: The Plan of Care Review/Shift note should be completed every shift.  The Outcome Evaluation is a brief statement about your assessment that the patient is improving, declining, or no change.  This information will be displayed automatically on your shift  note.  Outcome: Progressing  Flowsheets (Taken 1/14/2025 0353)  Plan of Care Reviewed With: patient  Goal: Patient-Specific Goal (Individualized)  Description: You can add care plan individualizations to a care plan. Examples of Individualization might be:  \"Parent requests to be called daily at 9am for status\", \"I have a hard time hearing out of my right ear\", or \"Do not touch me to wake me up as it startles  me\".  Outcome: Progressing  Goal: Absence of Hospital-Acquired Illness or Injury  Outcome: Progressing  Goal: Optimal Comfort and " Wellbeing  Outcome: Progressing  Goal: Readiness for Transition of Care  Outcome: Progressing     Problem: Comorbidity Management  Goal: Blood Glucose Levels Within Targeted Range  Outcome: Progressing  Goal: Blood Pressure in Desired Range  Outcome: Progressing     Problem: Risk for Delirium  Goal: Optimal Coping  Outcome: Progressing  Goal: Improved Behavioral Control  Outcome: Progressing  Goal: Improved Attention and Thought Clarity  Outcome: Progressing  Goal: Improved Sleep  Outcome: Progressing     Problem: Cardiovascular Surgery  Goal: Improved Activity Tolerance  Outcome: Progressing  Goal: Optimal Coping with Heart Surgery  Outcome: Progressing  Goal: Absence of Bleeding  Outcome: Progressing  Goal: Effective Bowel Elimination  Outcome: Progressing  Goal: Effective Cardiac Function  Outcome: Progressing  Goal: Optimal Cerebral Tissue Perfusion  Outcome: Progressing  Goal: Fluid and Electrolyte Balance  Outcome: Progressing  Goal: Blood Glucose Level Within Targeted Range  Outcome: Progressing  Goal: Absence of Infection Signs and Symptoms  Outcome: Progressing  Goal: Acceptable Pain Control  Outcome: Progressing  Goal: Nausea and Vomiting Relief  Outcome: Progressing  Goal: Effective Urinary Elimination  Outcome: Progressing  Goal: Effective Oxygenation and Ventilation  Outcome: Progressing

## 2025-01-14 NOTE — PLAN OF CARE
Goal Outcome Evaluation:      Plan of Care Reviewed With: patient, other (see comments)    Overall Patient Progress: no changeOverall Patient Progress: no change    Outcome Evaluation: pt remains confused and halucinating, incontinent of urine and stool, rectal tube inplace, immodium iniciated by writer.    D: Pt had VT arrest s/p CABG x 4  c/b pneumothorax s/p CT placement. Hospital course complicated by encephalopathy and confusion/hallucinations.    A/I: Pt has been in SR, vitally stable. Pt continues to have significant hallucination, seeing animals and people in room. Pt does not believe when corrected or agreeable to redirection. Pt denies any c/o pain. Pt does have very congested cough with course lung sounds. Pt sating in the 90's on RA. Pt refuses to use IS or acpela. Pt reports having no appetite and refused to eat dinner. Pt does continue to have large amount of liquid stool. Rectal tube in place. Imodium given x 1 and needs to be given each sift if not more frequently to be able to remove rectal tube.  Pt continues on TF at 60/hr. Pt appears to be tolerating . Pt is incontinent  of urine . Primafit attempted x 2 both time removed by pt.  Attends placed and changed as needed. Pt's skin noted to reddened in perineal area along with noted blister(intact)on right groin. Pt also has ecmo site on right groin sutured and CYRIL. Site reddened. Pt has old CT site CDI. Pt noted to have crepitous along right side of chest and neck, improving. Pt's potassium replaced. Pt's diet was advanced to pureed but pt refused to eat. Pt is a lift as pt has BKA on right and pt very deconditioned. Pt turned or encouraged to shift every 2 hours.    P: Continue with current POC. Encourage pt to be up in chair during day and early evening to help with Day/Night mixup, encourage intake. Ensure pt receives imodium throughout day until stooling lessens

## 2025-01-14 NOTE — PROGRESS NOTES
CVTS Progress Note  01/14/2025         Assessment and Plan:     Erwin Aguilar is a 64 year old male with a past medical history of DM2, HTN, PVD, and neuropathic pain who initially presented on 12/31/24 to the ED with arm pain, vomiting, and diarrhea; ACS and ST depression. He subsequently had VT arrest with 1 round of CPR, epinephrine, defibrillation. After ROSC was found to be in afib with RVR. Became hypotensive and was subsequently cannulated for VA ECMO. Found to have multivessel disease s/p CABG x4 on 1/2/25 with Dr. Rosenbaum. ECMO decannulated on 1/3/25 at bedside. ICU course complicated by large R PTX seen on chest CT on 1/4/25 s/p bedside CT placement, VAP, and encephalopathy. Extubated 1/11/25.       Cardiovascular:   Hx of CAD - s/p CABG x4 with Dr. Rosenbaum   Mixed shock - cardiogenic, vasoplegic, resolved   HTN  Pre-op VT Cardiac Arrest on VA ECMO (1/1 - 1/3)  HLD  Prolonged Qtc  Hx PAF on DOAC PTA  No arrhythmias overnight, HD stable, in NSR.   Post operative ECHO 1/5 with LVEF 55-60%, normal RV function, no pericardial effusion   Will need repeat echo prior to discharge in setting of therapeutic AC  -  mg daily  - Rosuvastatin 40 mg daily   - Increase metoprolol to 37.5 mg BID   - Start losartan 25mg daily  - Diuresis as below  - 1/13 QTc: 525 (536)     Chest tubes: Removed in ICU   TPW: Removed in ICU     Pulmonary:  Acute Hypoxic Respiratory failure, resolved   Respiratory alkalosis 2:2 agitation - resolved   R tension pneumothorax, resolved   VAP  SubQ emphysema   Extubated POD 9 to 4 lpm via NC. Now saturating well on RA.  - Supplemental O2 PRN to keep sats > 92%  - Pulm hygiene, IS, OOB/activity and deep breathing  - ABX detailed below   - PRN Mucomyst and Duoneb    Neurology / MSK:  Acute post-operative pain, improved  Encephalopathy, improved   Delirium   Anxiety   Chronic pain with opioid dependence  Neuropathy, PTA  Insomnia   Pain controlled with current regimen:  - Acetaminophen  - 1/1  vEEG without seizure activity (severe encephalopathy) and 12/31, 1/4, and 1/7 CTH negative for acute findings.   - PTA Sertraline  - PTA PRN hydroxyzine   - PO oxycodone PRN  - PTA baclofen   - Melatonin at bedtime  - Delirium precautions; wean sitter as able        / Renal / Fluid / Electrolytes:  Hypernatremia, resolved   AUSTIN/ATN, resolved  Hypervolemia   Urinary retention   Hypokalemia   BL creat ~ 0.6-0.8, most recent creatinine stable; adequate UOP.   - Diuresis: 60 mg IV Lasix BID  - 60 meq of potassium BID x5 days  - 200 mg mag ox daily x5 days  - Replete lytes per protocol  - Strict I/O, daily weights  - Avoid/limit nephrotoxins as able    GI / Nutrition:   Dysphagia   Severe malnutrition in the context of acute on chronic illness   Liquid stools   - SLP following  Orders Placed This Encounter      Pureed Diet (level 4) Thin Liquids (level 0)  - TF @ goal via NJ   - PRN bowel regimen  - PRN Imodium 2 mg QID PRN     Endocrine:  Stress-induced hyperglycemia, resolved  Type 2 diabetes mellitus   Hgb A1c 5.7  - Initially managed on insulin drip postop, transitioned to sliding scale & Lantus  - Goal BG <180 for optimal healing  - Lantus increased to PTA dosing 26 units daily     Infectious Disease:  Stress-induced leukocytosis, resolved  Sternal wound infection  Aspiration PNA: Klebsiella pneumoniae   WBC uptrending, afebrile  - Completed perioperative antibiotics  - Continue to monitor fever curve, CBC; repeat pan cx today as well as BLE US to r/o DVT  - Klebsiella pneumoniae from sputum cultures on 12/31 and 1/9.   - Zosyn and vancomycin were restarted 1/9 for fever. Sputum culture again grew Klebsiella pneumoniae.   - 1/11 noted to have small puss drainage from sternotomy    - Continue Vanc/Zosyn per Dr. Rosenbaum, tentative plan for 7-10 course.    - CT chest on 1/12/25, not concerning for deep infection.     Hematology:   Acute blood loss anemia  Thrombocytopenia, resolved  Thrombocytosis, likely  reactive  Therapeutic AC for PAF  Hgb stable; Plt 461 (425), no signs or symptoms of active bleeding  - CBC daily    Anticoagulation:   - ASA 81 mg   - Resume PTA apixaban for hx of PAF now s/p MAZE and LAAC; defer long-term AC plan to OP Cardiology    MSK/Skin:  Sternotomy  Surgical incision  Hx RLE BKA  - Sternal precautions  - Incisional cares per protocol    Prophylaxis:   - Stress ulcer prophylaxis: Pantoprazole 40 mg daily for 30 days  - DVT prophylaxis: therapeutic AC, OOB/ambulation, SCD    Disposition:   - Transferred to  1/12  - Therapies recommending discharge to TCU pending medical stability - likely ready in 48-72 hours    Dr. Rosenbaum updated.    Katt Sprague, CNP, ACNPC-AG  Cardiothoracic Surgery  [employer] Pager [phone]          Interval History:     Agitated overnight and placed on a sitter. Mildly confused, forgetful.  Pain is well managed on current regimen. Reports he didn't sleep well but unable to identify a specific reason for his sleep difficulties  Tolerating tube feeds minimal appetite, + BM via rectal tube.   No nausea, vomiting, or abdominal pain.  Denies SOB.  Asks to have his prosthetic leg put on.  Denies sternal popping/clicking.         Physical Exam:     Gen: NAD, resting in bed, sitter at bedside  Neuro: Alert & grossly oriented but mildly confused, no focal deficits, face symmetric, speech clear  CV: RRR on monitor - SR  Pulm: unlabored breathing on RA  Abd: SNDNT, rectal tube in place  :  normal external genitalia, external catheter in place  Ext: moderate peripheral edema, old sanguinous drainage from distal LLE EVH site, well healed RLE BKA stump, moderate pitting LE edema, R groin incision C/D/I with sutures in place  Incision: clean, dry, no erythema or induration, sternum stable, very small amount of drainage expressed from superior 2 inches of sternal incision  Tubes/drain sites: CYRIL, scabbed over, no erythema or induration         Data:    Imaging:      No results  found for this or any previous visit (from the past 24 hours).       Labs:  Recent Labs   Lab 01/14/25  1211 01/14/25  1204 01/14/25  0745 01/14/25  0622 01/14/25  0020 01/13/25  2207 01/13/25  1641 01/13/25  1411 01/13/25  0836 01/13/25  0425 01/12/25  0814 01/12/25  0604   WBC  --   --   --  15.3*  --   --   --   --   --  12.3*  --  12.9*   HGB  --   --   --  9.0*  --   --   --   --   --  8.7*  --  8.6*   MCV  --   --   --  91  --   --   --   --   --  95  --  92   PLT  --   --   --  461*  --   --   --   --   --  425  --  394   NA  --   --   --  141  --   --   --  140  --  142   < > 142   POTASSIUM 3.5  --   --  3.3*  --  3.6  --  3.1*  --  3.0*   < > 3.0*  3.0*   CHLORIDE  --   --   --  108*  --   --   --  106  --  108*   < > 109*   CO2  --   --   --  21*  --   --   --  23  --  23   < > 22   BUN  --   --   --  19.2  --   --   --  19.8  --  19.5   < > 18.2   CR  --   --   --  0.45*  --   --   --  0.45*  --  0.49*   < > 0.52*   ANIONGAP  --   --   --  12  --   --   --  11  --  11   < > 11   A[place]  --   --   --  8.5*  --   --   --  8.2*  --  8.0*   < > 7.9*   GLC  --  163* 177* 166*   < >  --    < > 155*   < > 179*   < > 176*    < > = values in this interval not displayed.      GLUCOSE:   Recent Labs   Lab 01/14/25  1204 01/14/25  0745 01/14/25  0622 01/14/25  0320 01/14/25  0020 01/13/25  2205   * 177* 166* 151* 133* 137*

## 2025-01-14 NOTE — PHARMACY-VANCOMYCIN DOSING SERVICE
"Pharmacy Vancomycin Note  Date of Service 2025  Patient's  1960   64 year old, male    Indication:  sternal wound infection (previous bacteremia now thought to only be contaminant)  Day of Therapy: 6  Current vancomycin regimen:  1250 mg IV q12h  Current vancomycin monitoring method: AUC  Current vancomycin therapeutic monitoring goal: 400-600 mg*h/L    InsightRX Prediction of Current Vancomycin Regimen    Regimen: 1250 mg IV every 12 hours.  Start time: 08:46 on 2025  Exposure target: AUC24 (range)400-600 mg/L.hr   AUC24,ss: 402 mg/L.hr  Probability of AUC24 > 400: 53 %  Ctrough,ss: 9.2 mg/L  Probability of Ctrough,ss > 20: 0 %  Probability of nephrotoxicity (Lodise MYRIAM ): 5 %      Current estimated CrCl = Estimated Creatinine Clearance: 206.3 mL/min (A) (based on SCr of 0.45 mg/dL (L)).    Creatinine for last 3 days  2025:  5:22 PM Creatinine 0.58 mg/dL  2025:  6:04 AM Creatinine 0.52 mg/dL;  4:17 PM Creatinine 0.57 mg/dL;  9:45 PM Creatinine 0.57 mg/dL  2025:  4:25 AM Creatinine 0.49 mg/dL;  2:11 PM Creatinine 0.45 mg/dL  2025:  6:22 AM Creatinine 0.45 mg/dL    Recent Vancomycin Levels (past 3 days)  2025:  8:56 AM Vancomycin 34.7 ug/mL;  5:22 PM Vancomycin 11.2 ug/mL  2025:  6:22 AM Vancomycin 10.1 ug/mL    Vancomycin IV Administrations (past 72 hours)                     vancomycin (VANCOCIN) 1,250 mg in 0.9% NaCl 250 mL intermittent infusion (mg) 1,250 mg New Bag 25     1,250 mg New Bag  0807     1,250 mg New Bag 25     1,250 mg New Bag  0801     1,250 mg New Bag 25                    Nephrotoxins and other renal medications (From now, onward)      Start     Dose/Rate Route Frequency Ordered Stop    25 1300  piperacillin-tazobactam (ZOSYN) intermittent infusion 3.375 g        Note to Pharmacy: For [facility], [facility] and [facility]: For Zosyn-naive patients, use the \"Zosyn initial dose + extended infusion\" order panel.    3.375 " g  100 mL/hr over 30 Minutes Intravenous EVERY 6 HOURS 01/13/25 0742      01/11/25 2000  vancomycin (VANCOCIN) 1,250 mg in 0.9% NaCl 250 mL intermittent infusion         1,250 mg  166.7 mL/hr over 90 Minutes Intravenous EVERY 12 HOURS 01/11/25 1256                 Contrast Orders - past 72 hours (72h ago, onward)      Start     Dose/Rate Route Frequency Stop    01/12/25 0830  iopamidol (ISOVUE-370) solution 95 mL         95 mL Intravenous ONCE 01/12/25 0835            Interpretation of levels and current regimen:  Vancomycin level is reflective of -600    Has serum creatinine changed greater than 50% in last 72 hours: No    Urine output:  good urine output    Renal Function: Stable    Plan:  Continue Current Dose  Vancomycin monitoring method: AUC  Vancomycin therapeutic monitoring goal: 400-600 mg*h/L  Pharmacy will check vancomycin levels as appropriate in 5-7 Days.  Serum creatinine levels will be ordered a minimum of twice weekly.    Ernesto Saunders [facility]

## 2025-01-15 ENCOUNTER — APPOINTMENT (OUTPATIENT)
Dept: PHYSICAL THERAPY | Facility: CLINIC | Age: 65
DRG: 003 | End: 2025-01-15
Payer: MEDICARE

## 2025-01-15 LAB
ANION GAP SERPL CALCULATED.3IONS-SCNC: 10 MMOL/L (ref 7–15)
BACTERIA BLD CULT: NO GROWTH
BACTERIA BLD CULT: NO GROWTH
BUN SERPL-MCNC: 20 MG/DL (ref 8–23)
CALCIUM SERPL-MCNC: 8.4 MG/DL (ref 8.8–10.4)
CHLORIDE SERPL-SCNC: 108 MMOL/L (ref 98–107)
CREAT SERPL-MCNC: 0.5 MG/DL (ref 0.67–1.17)
EGFRCR SERPLBLD CKD-EPI 2021: >90 ML/MIN/1.73M2
ERYTHROCYTE [DISTWIDTH] IN BLOOD BY AUTOMATED COUNT: 17.8 % (ref 10–15)
GLUCOSE BLDC GLUCOMTR-MCNC: 164 MG/DL (ref 70–99)
GLUCOSE BLDC GLUCOMTR-MCNC: 166 MG/DL (ref 70–99)
GLUCOSE BLDC GLUCOMTR-MCNC: 172 MG/DL (ref 70–99)
GLUCOSE BLDC GLUCOMTR-MCNC: 189 MG/DL (ref 70–99)
GLUCOSE BLDC GLUCOMTR-MCNC: 198 MG/DL (ref 70–99)
GLUCOSE BLDC GLUCOMTR-MCNC: 208 MG/DL (ref 70–99)
GLUCOSE SERPL-MCNC: 212 MG/DL (ref 70–99)
HCO3 SERPL-SCNC: 22 MMOL/L (ref 22–29)
HCT VFR BLD AUTO: 28.7 % (ref 40–53)
HGB BLD-MCNC: 9.1 G/DL (ref 13.3–17.7)
MAGNESIUM SERPL-MCNC: 1.9 MG/DL (ref 1.7–2.3)
MCH RBC QN AUTO: 30 PG (ref 26.5–33)
MCHC RBC AUTO-ENTMCNC: 31.7 G/DL (ref 31.5–36.5)
MCV RBC AUTO: 95 FL (ref 78–100)
PLATELET # BLD AUTO: 458 10E3/UL (ref 150–450)
POTASSIUM SERPL-SCNC: 3.5 MMOL/L (ref 3.4–5.3)
RBC # BLD AUTO: 3.03 10E6/UL (ref 4.4–5.9)
SODIUM SERPL-SCNC: 140 MMOL/L (ref 135–145)
WBC # BLD AUTO: 13.6 10E3/UL (ref 4–11)

## 2025-01-15 PROCEDURE — 250N000011 HC RX IP 250 OP 636: Performed by: NURSE PRACTITIONER

## 2025-01-15 PROCEDURE — 250N000013 HC RX MED GY IP 250 OP 250 PS 637: Performed by: NURSE PRACTITIONER

## 2025-01-15 PROCEDURE — 99222 1ST HOSP IP/OBS MODERATE 55: CPT | Performed by: PSYCHIATRY & NEUROLOGY

## 2025-01-15 PROCEDURE — 83735 ASSAY OF MAGNESIUM: CPT | Performed by: STUDENT IN AN ORGANIZED HEALTH CARE EDUCATION/TRAINING PROGRAM

## 2025-01-15 PROCEDURE — 80048 BASIC METABOLIC PNL TOTAL CA: CPT

## 2025-01-15 PROCEDURE — 250N000011 HC RX IP 250 OP 636: Performed by: PHYSICIAN ASSISTANT

## 2025-01-15 PROCEDURE — 250N000013 HC RX MED GY IP 250 OP 250 PS 637

## 2025-01-15 PROCEDURE — 85018 HEMOGLOBIN: CPT

## 2025-01-15 PROCEDURE — 250N000013 HC RX MED GY IP 250 OP 250 PS 637: Performed by: STUDENT IN AN ORGANIZED HEALTH CARE EDUCATION/TRAINING PROGRAM

## 2025-01-15 PROCEDURE — 82374 ASSAY BLOOD CARBON DIOXIDE: CPT

## 2025-01-15 PROCEDURE — 250N000013 HC RX MED GY IP 250 OP 250 PS 637: Performed by: SURGERY

## 2025-01-15 PROCEDURE — 250N000011 HC RX IP 250 OP 636: Performed by: SURGERY

## 2025-01-15 PROCEDURE — 250N000011 HC RX IP 250 OP 636: Performed by: STUDENT IN AN ORGANIZED HEALTH CARE EDUCATION/TRAINING PROGRAM

## 2025-01-15 PROCEDURE — 97530 THERAPEUTIC ACTIVITIES: CPT | Mod: GP | Performed by: PHYSICAL THERAPIST

## 2025-01-15 PROCEDURE — 120N000003 HC R&B IMCU UMMC

## 2025-01-15 PROCEDURE — 36415 COLL VENOUS BLD VENIPUNCTURE: CPT

## 2025-01-15 RX ORDER — RAMELTEON 8 MG/1
8 TABLET ORAL AT BEDTIME
Status: DISCONTINUED | OUTPATIENT
Start: 2025-01-15 | End: 2025-01-25 | Stop reason: HOSPADM

## 2025-01-15 RX ORDER — MAGNESIUM SULFATE HEPTAHYDRATE 40 MG/ML
2 INJECTION, SOLUTION INTRAVENOUS ONCE
Status: COMPLETED | OUTPATIENT
Start: 2025-01-15 | End: 2025-01-15

## 2025-01-15 RX ORDER — OLANZAPINE 5 MG/1
5 TABLET, ORALLY DISINTEGRATING ORAL EVERY 12 HOURS PRN
Status: DISCONTINUED | OUTPATIENT
Start: 2025-01-15 | End: 2025-01-20

## 2025-01-15 RX ORDER — OLANZAPINE 10 MG/2ML
2.5 INJECTION, POWDER, FOR SOLUTION INTRAMUSCULAR
Status: COMPLETED | OUTPATIENT
Start: 2025-01-15 | End: 2025-01-15

## 2025-01-15 RX ORDER — OLANZAPINE 10 MG/2ML
2.5 INJECTION, POWDER, FOR SOLUTION INTRAMUSCULAR EVERY 12 HOURS PRN
Status: DISCONTINUED | OUTPATIENT
Start: 2025-01-15 | End: 2025-01-20

## 2025-01-15 RX ORDER — GABAPENTIN 300 MG/1
300 CAPSULE ORAL
Status: DISCONTINUED | OUTPATIENT
Start: 2025-01-15 | End: 2025-01-25 | Stop reason: HOSPADM

## 2025-01-15 RX ORDER — GABAPENTIN 300 MG/1
600 CAPSULE ORAL AT BEDTIME
Status: DISCONTINUED | OUTPATIENT
Start: 2025-01-15 | End: 2025-01-17

## 2025-01-15 RX ADMIN — POTASSIUM CHLORIDE 60 MEQ: 1.5 POWDER, FOR SOLUTION ORAL at 08:23

## 2025-01-15 RX ADMIN — ONDANSETRON 4 MG: 4 TABLET, ORALLY DISINTEGRATING ORAL at 01:01

## 2025-01-15 RX ADMIN — SERTRALINE HYDROCHLORIDE 200 MG: 100 TABLET ORAL at 08:23

## 2025-01-15 RX ADMIN — CALCIUM CARBONATE (ANTACID) CHEW TAB 500 MG 1000 MG: 500 CHEW TAB at 08:23

## 2025-01-15 RX ADMIN — INSULIN ASPART 2 UNITS: 100 INJECTION, SOLUTION INTRAVENOUS; SUBCUTANEOUS at 16:35

## 2025-01-15 RX ADMIN — INSULIN ASPART 2 UNITS: 100 INJECTION, SOLUTION INTRAVENOUS; SUBCUTANEOUS at 23:28

## 2025-01-15 RX ADMIN — INSULIN ASPART 1 UNITS: 100 INJECTION, SOLUTION INTRAVENOUS; SUBCUTANEOUS at 12:05

## 2025-01-15 RX ADMIN — FUROSEMIDE 60 MG: 10 INJECTION, SOLUTION INTRAMUSCULAR; INTRAVENOUS at 15:01

## 2025-01-15 RX ADMIN — INSULIN ASPART 3 UNITS: 100 INJECTION, SOLUTION INTRAVENOUS; SUBCUTANEOUS at 08:40

## 2025-01-15 RX ADMIN — LOSARTAN POTASSIUM 25 MG: 25 TABLET, FILM COATED ORAL at 08:24

## 2025-01-15 RX ADMIN — LOPERAMIDE HYDROCHLORIDE 2 MG: 2 CAPSULE ORAL at 18:47

## 2025-01-15 RX ADMIN — Medication 1250 MG: at 12:05

## 2025-01-15 RX ADMIN — PIPERACILLIN SODIUM AND TAZOBACTAM SODIUM 3.38 G: 3; .375 INJECTION, SOLUTION INTRAVENOUS at 22:36

## 2025-01-15 RX ADMIN — BACLOFEN 10 MG: 10 TABLET ORAL at 16:32

## 2025-01-15 RX ADMIN — ASPIRIN 81 MG CHEWABLE TABLET 81 MG: 81 TABLET CHEWABLE at 08:23

## 2025-01-15 RX ADMIN — LOPERAMIDE HYDROCHLORIDE 2 MG: 2 CAPSULE ORAL at 22:41

## 2025-01-15 RX ADMIN — POTASSIUM CHLORIDE 60 MEQ: 1.5 POWDER, FOR SOLUTION ORAL at 20:21

## 2025-01-15 RX ADMIN — ROSUVASTATIN CALCIUM 40 MG: 20 TABLET, FILM COATED ORAL at 08:24

## 2025-01-15 RX ADMIN — Medication 60 ML: at 08:25

## 2025-01-15 RX ADMIN — MAGNESIUM SULFATE HEPTAHYDRATE 2 G: 2 INJECTION, SOLUTION INTRAVENOUS at 09:55

## 2025-01-15 RX ADMIN — CALCIUM CARBONATE (ANTACID) CHEW TAB 500 MG 1000 MG: 500 CHEW TAB at 16:31

## 2025-01-15 RX ADMIN — PIPERACILLIN SODIUM AND TAZOBACTAM SODIUM 3.38 G: 3; .375 INJECTION, SOLUTION INTRAVENOUS at 16:32

## 2025-01-15 RX ADMIN — LOPERAMIDE HYDROCHLORIDE 2 MG: 2 CAPSULE ORAL at 12:55

## 2025-01-15 RX ADMIN — THERA TABS 1 TABLET: TAB at 08:24

## 2025-01-15 RX ADMIN — APIXABAN 5 MG: 5 TABLET, FILM COATED ORAL at 08:23

## 2025-01-15 RX ADMIN — PIPERACILLIN SODIUM AND TAZOBACTAM SODIUM 3.38 G: 3; .375 INJECTION, SOLUTION INTRAVENOUS at 04:24

## 2025-01-15 RX ADMIN — Medication 60 ML: at 20:22

## 2025-01-15 RX ADMIN — OLANZAPINE 2.5 MG: 10 INJECTION, POWDER, FOR SOLUTION INTRAMUSCULAR at 16:32

## 2025-01-15 RX ADMIN — GABAPENTIN 600 MG: 300 CAPSULE ORAL at 22:36

## 2025-01-15 RX ADMIN — INSULIN ASPART 3 UNITS: 100 INJECTION, SOLUTION INTRAVENOUS; SUBCUTANEOUS at 04:25

## 2025-01-15 RX ADMIN — PIPERACILLIN SODIUM AND TAZOBACTAM SODIUM 3.38 G: 3; .375 INJECTION, SOLUTION INTRAVENOUS at 11:09

## 2025-01-15 RX ADMIN — BACLOFEN 10 MG: 10 TABLET ORAL at 08:23

## 2025-01-15 RX ADMIN — BACLOFEN 10 MG: 10 TABLET ORAL at 23:21

## 2025-01-15 RX ADMIN — OLANZAPINE 2.5 MG: 10 INJECTION, POWDER, FOR SOLUTION INTRAMUSCULAR at 11:12

## 2025-01-15 RX ADMIN — Medication 37.5 MG: at 08:23

## 2025-01-15 RX ADMIN — GABAPENTIN 300 MG: 300 CAPSULE ORAL at 16:32

## 2025-01-15 RX ADMIN — Medication 37.5 MG: at 20:21

## 2025-01-15 RX ADMIN — OLANZAPINE 5 MG: 5 TABLET, ORALLY DISINTEGRATING ORAL at 23:23

## 2025-01-15 RX ADMIN — FUROSEMIDE 60 MG: 10 INJECTION, SOLUTION INTRAMUSCULAR; INTRAVENOUS at 09:55

## 2025-01-15 RX ADMIN — INSULIN ASPART 2 UNITS: 100 INJECTION, SOLUTION INTRAVENOUS; SUBCUTANEOUS at 20:25

## 2025-01-15 RX ADMIN — APIXABAN 5 MG: 5 TABLET, FILM COATED ORAL at 20:20

## 2025-01-15 RX ADMIN — RAMELTEON 8 MG: 8 TABLET ORAL at 22:37

## 2025-01-15 ASSESSMENT — ACTIVITIES OF DAILY LIVING (ADL)
ADLS_ACUITY_SCORE: 73
ADLS_ACUITY_SCORE: 64
ADLS_ACUITY_SCORE: 65
ADLS_ACUITY_SCORE: 63
ADLS_ACUITY_SCORE: 73
ADLS_ACUITY_SCORE: 64
ADLS_ACUITY_SCORE: 73
ADLS_ACUITY_SCORE: 63
ADLS_ACUITY_SCORE: 73
ADLS_ACUITY_SCORE: 63
ADLS_ACUITY_SCORE: 73
ADLS_ACUITY_SCORE: 63
ADLS_ACUITY_SCORE: 63
ADLS_ACUITY_SCORE: 73
ADLS_ACUITY_SCORE: 63
ADLS_ACUITY_SCORE: 73
ADLS_ACUITY_SCORE: 63

## 2025-01-15 NOTE — PROGRESS NOTES
Called to obtain consent, unable to reach Isaeblla Winter at [phone], message left for her to call vascular access or to call 6C and speak with Charge nurse or bedside RN.

## 2025-01-15 NOTE — CONSULTS
"        Initial Psychiatric Consult   Consult date: January 15, 2025         Reason for Consult, requesting source:    AMS.   Requesting source: Rodriguez Rosenbaum    Labs and imaging reviewed. Discussed with sitter and CVTS     Total time spent in chart review, patient interview and coordination of care; 70 min          HPI:   From surgery note 1/14:   \"Erwin Aguilar is a 64 year old male with a past medical history of DM2, HTN, PVD, and neuropathic pain who initially presented on 12/31/24 to the ED with arm pain, vomiting, and diarrhea; ACS and ST depression. He subsequently had VT arrest with 1 round of CPR, epinephrine, defibrillation. After ROSC was found to be in afib with RVR. Became hypotensive and was subsequently cannulated for VA ECMO. Found to have multivessel disease s/p CABG x4 on 1/2/25 with Dr. Rosenbaum. ECMO decannulated on 1/3/25 at bedside. ICU course complicated by large R PTX seen on chest CT on 1/4/25 s/p bedside CT placement, VAP, and encephalopathy. Extubated 1/11/25.\"    He is being seen by psychiatry to address AMS and agitation in the context of a prolonged QTc.   He has been confused and intermittently agitated, trying to get out of bed, difficult to redirect.   When I came in he immediately said \"my head is messed up\". He said that he has been confused, but was oriented to place, day and date. Also provided a fairly good medical history. He was very talkative, a bit loose. Denied any hallucinations or paranoia.   We reviewed his history of anxiety, some depression and he mentioned that he has been pretty stable lately, but wasn't sure what meds he was on (Zoloft and gabapentin).    Per sitter, he has been a bit agitated at times, requires redirection.         Past Psychiatric History:   He was seen by Dr Monge from our service in March 2014 for anxiety and alcohol and cannabis use problems. He was on 60 mg per day of Prozac at that time and day program to address CD issues was suggested. "         Substance Use and History:          Tobacco Use    Smoking status: Former     Current packs/day: 0.00     Types: Cigarettes     Start date: 10/13/1983     Quit date: 1984     Years since quittin.6     Passive exposure: Past    Smokeless tobacco: Never   Substance Use Topics    Alcohol use: Not Currently     Comment: - last drink was 3/28/14   He was noted to be using cannabis when seen by psychiatry in  and he said  that he has been using it until recently         Past Medical History:   PAST MEDICAL HISTORY:   Past Medical History:   Diagnosis Date    Actinic keratosis     aldara    Anxiety 1997    Cancer (H)     squamous cell skin CA    Cauda equina spinal cord injury (H)     Chronic sinusitis 2016    Cirrhosis of liver (H) 2014    Not biopsy-proven as of 14.      Depressive disorder     Diastasis recti     Esophageal reflux     Esophageal varices in cirrhosis (H) 2014    Hospitalized for UGI blee 3/28/14, endoscopy revealed bleeding varices.    Essential hypertension, benign     Intermittent asthma     Mild depression     Mixed hyperlipidemia     Nasal polyps 2016    Osteoarthritis of lumbar spine, unspecified spinal osteoarthritis complication status     Other chronic pain     Paroxysmal atrial fibrillation (H) 2021    SCCA (squamous cell carcinoma) of skin     Seasonal allergic conjunctivitis     Type II or unspecified type diabetes mellitus without mention of complication, not stated as uncontrolled     Unspecified site of spinal cord injury without evidence of spinal bone injury     due to back surgery       PAST SURGICAL HISTORY:   Past Surgical History:   Procedure Laterality Date    ABDOMEN SURGERY      AMPUTATE LEG BELOW KNEE Right 2024    Procedure: Right below knee amputation (transtibial amputation);  Surgeon: Kurtis Nye MD;  [facility]: [facility]    ANGIOGRAM Right 2024    Procedure: Ultrasound guided percutaneous transarterial left  common femoral artery access, diagnostic aortoiliac angiogram, selective cannulation of right comon iliac, righ external iliac, right common femoral, and right superficial femoral artery, balloon angioplasty of right superficial femoral artery, 6mm x 12 cm self-expanding drug-coated stent placement of right superficial femoral artery, lef    BACK SURGERY  August 2009    BYPASS GRAFT ARTERY CORONARY N/A 1/2/2025    Procedure: Median Sternotomy, on Cardiopulmonary Bypass Pump, Left Internal Mammary Artery [place], Endoscopic [place] of Left Greater Saphenous Vein, Coronary Artery Bypass Graft x 4, Left Atrial Appendage Ligation, Left Atrial Ablation, and Intraoperative Transesophageal Echocardiogram By Anesthesia;  Surgeon: Bernice Rosenbaum MD;  [facility]    COLONOSCOPY N/A 5/12/2016    Procedure: COMBINED COLONOSCOPY, SINGLE OR MULTIPLE BIOPSY/POLYPECTOMY BY BIOPSY;  Surgeon: Ana Paula Urbina MD;  [facility]    DECOMPRESSION CUBITAL TUNNEL Left 8/31/2023    Procedure: LEFT CUBITAL TUNNEL RELEASE,;  Surgeon: Deny Dixon MD;  [place] [facility] [place]    ENDOSCOPY UPPER, COLONOSCOPY, COMBINED  10/19/2011    Procedure:COMBINED ENDOSCOPY UPPER, COLONOSCOPY; Upper Endoscopy, Colonoscopy with Polypectomy x4; Surgeon:AMBAR RODRÍGUEZ; Location:[facility]    ENT SURGERY  1-2016    Ongoing since then    ESOPHAGOSCOPY, GASTROSCOPY, DUODENOSCOPY (EGD), COMBINED  3/28/2014    Procedure: COMBINED ESOPHAGOSCOPY, GASTROSCOPY, DUODENOSCOPY (EGD);  EGD, Gastric Biopsies, Esophageal Banding;  Surgeon: Reyna Tovar MD;  Location:  OR    ESOPHAGOSCOPY, GASTROSCOPY, DUODENOSCOPY (EGD), COMBINED  6/9/2014    Procedure: COMBINED ESOPHAGOSCOPY, GASTROSCOPY, DUODENOSCOPY (EGD);  Surgeon: Curtis Mendez MD;  Location:  GI    ESOPHAGOSCOPY, GASTROSCOPY, DUODENOSCOPY (EGD), COMBINED  7/24/2014    Procedure: COMBINED ESOPHAGOSCOPY, GASTROSCOPY, DUODENOSCOPY (EGD);  Surgeon: Gerard Samaniego MD;  Location:  OR     ESOPHAGOSCOPY, GASTROSCOPY, DUODENOSCOPY (EGD), COMBINED N/A 10/31/2014    Procedure: COMBINED ESOPHAGOSCOPY, GASTROSCOPY, DUODENOSCOPY (EGD);  Surgeon: Gerard Samaniego MD;  [facility]    ESOPHAGOSCOPY, GASTROSCOPY, DUODENOSCOPY (EGD), COMBINED N/A 5/12/2016    Procedure: COMBINED ESOPHAGOSCOPY, GASTROSCOPY, DUODENOSCOPY (EGD);  Surgeon: Ana Paula Urbina MD;  [facility]    ESOPHAGOSCOPY, GASTROSCOPY, DUODENOSCOPY (EGD), COMBINED N/A 8/2/2018    Procedure: COMBINED ESOPHAGOSCOPY, GASTROSCOPY, DUODENOSCOPY (EGD);  EGD;  Surgeon: Yu Wagner MD;  [facility]    HCL SQUAMOUS CELL CARCINOMA AG  10/07    left forearm    HERNIORRHAPHY UMBILICAL  11/8/2012    Procedure: HERNIORRHAPHY UMBILICAL;  Open Umbilical Hernia Repair With Mesh ;  Surgeon: Chase Nicholson MD;  [facility]    INSERT STIMULATOR DORSAL COLUMN N/A 6/28/2018    Procedure: INSERT STIMULATOR DORSAL COLUMN;;  Surgeon: Elvis Sauceda MD;  [facility]    IR LOWER EXTREMITY ANGIOGRAM RIGHT  4/23/2024    neuro stimulator  2010    RELEASE CARPAL TUNNEL Left 8/31/2023    Procedure: LEFT OPEN CARPAL TUNNEL RELEASE,;  Surgeon: Deny Dixon MD;  [facility]    RELEASE TRIGGER FINGER Left 8/31/2023    Procedure: Release left trigger finger thumb;  Surgeon: Deny Dixon MD;  Location: [facility] OR    REMOVE EXTRACORPORAL MEMBRANE OXYGENATOR ADULT N/A 1/3/2025    Procedure: Remove Left Leg Peripheral Extracorporal Membrane Oxygenator and Closure;  Surgeon: Bernice Rosenbaum MD;  [facility]    REMOVE GENERATOR STIMULATOR (LOCATION) N/A 6/28/2018    Procedure: REMOVE GENERATOR STIMULATOR (LOCATION);  Spinal Cord Stimulator and IPG Explant and Re-Implant of SCS System (Leads and IPG);  Surgeon: Elvis Sauceda MD;  [facility]    REPAIR TENDON ACHILLES Right 11/11/2020    Procedure: Right achilles debridement and partial calcaneus excision;  Surgeon: Jaime  Eh French MD;  Location: [facility] OR    SURGICAL HISTORY OF -   1/02    abcess tooth    SURGICAL HISTORY OF -   1999    L4-5 laminectomy, cauda equina syndrome    SURGICAL HISTORY OF -   +    herniated disk repair    TONSILLECTOMY  10 1964    TRANSPOSITION ULNAR NERVE (ELBOW)      right    ZZC APPENDECTOMY  1974             Family History:   FAMILY HISTORY:   Family History   Problem Relation Age of Onset    Cancer Mother         lung    Breast Cancer Mother     Other Cancer Mother     Cancer Father         esophogeal, GERD    Snoring Father     Diabetes Brother         amputation, Type 1    Diabetes Brother     Diabetes Brother     Cancer - colorectal No family hx of     Glaucoma No family hx of     Macular Degeneration No family hx of     Anesthesia Reaction No family hx of     Venous thrombosis No family hx of            Social History:   He lives with his wife of 27 years (Isabella), he has no children. They are both disabled without assistance and so I'm not sure how they function especially since his amputation last May.   More details in the note 1/9 by Priscilla MONTESINOS.          Physical ROS:   The 10 point Review of Systems is negative other than noted in the HPI or here.           Medications:     Current Facility-Administered Medications   Medication Dose Route Frequency Provider Last Rate Last Admin    apixaban ANTICOAGULANT (ELIQUIS) tablet 5 mg  5 mg Oral BID Katt Sprague NP   5 mg at 01/15/25 0823    aspirin (ASA) chewable tablet 81 mg  81 mg Oral or Feeding Tube Daily Katt Sprague NP   81 mg at 01/15/25 0823    baclofen (LIORESAL) tablet 10 mg  10 mg Oral or Feeding Tube Q8H Jovita Aleman APRN CNP   10 mg at 01/15/25 0823    calcium carbonate (TUMS) chewable tablet 1,000 mg  1,000 mg Oral BID AC Vidal Leon MD   1,000 mg at 01/15/25 0823    fiber modular (BANATROL TF) packet 1 packet  1 packet Per Feeding Tube BID Renata Rey MD   1 packet at 01/15/25 0825    furosemide  (LASIX) injection 60 mg  60 mg Intravenous bid 08 & 14 Katt Sprague NP   60 mg at 01/15/25 0955    insulin aspart (NovoLOG) injection (RAPID ACTING)  1-12 Units Subcutaneous Q4H Denny James PA-C   1 Units at 01/15/25 1205    [START ON 1/16/2025] insulin glargine (LANTUS PEN) injection 28 Units  28 Units Subcutaneous QAM AC Katt Sprague NP        losartan (COZAAR) tablet 25 mg  25 mg Oral Daily Katt Sprague NP   25 mg at 01/15/25 0824    melatonin tablet 8 mg  8 mg Oral QPM Katt Sprague NP        metoprolol tartrate (LOPRESSOR) half-tab 37.5 mg  37.5 mg Oral BID Katt Sprague NP   37.5 mg at 01/15/25 0823    multivitamin, therapeutic (THERA-VIT) tablet 1 tablet  1 tablet Oral or Feeding Tube Daily Renata Rey MD   1 tablet at 01/15/25 0824    OLANZapine zydis (zyPREXA) ODT tab 5 mg  5 mg Oral At Bedtime Sean Negron MD   5 mg at 01/14/25 2246    piperacillin-tazobactam (ZOSYN) intermittent infusion 3.375 g  3.375 g Intravenous Q6H Bernice Rosenbaum  mL/hr at 01/15/25 1109 3.375 g at 01/15/25 1109    potassium chloride (KLOR-CON) Packet 60 mEq  60 mEq Oral or Feeding Tube BID Katt Sprague NP   60 mEq at 01/15/25 0823    Prosource TF20 ENfit Compatibl EN LIQD (PROSOURCE TF20) packet 60 mL  1 packet Per Feeding Tube BID Renata Rey MD   60 mL at 01/15/25 0825    rosuvastatin (CRESTOR) tablet 40 mg  40 mg Oral Daily Jovita Aleman APRN CNP   40 mg at 01/15/25 0824    sertraline (ZOLOFT) tablet 200 mg  200 mg Oral Daily Sean Negron MD   200 mg at 01/15/25 0823    sodium chloride (PF) 0.9% PF flush 3 mL  3 mL Intracatheter Q8H Sonia Fajardo PA-C   3 mL at 01/15/25 0826    vancomycin (VANCOCIN) 1,250 mg in 0.9% NaCl 250 mL intermittent infusion  1,250 mg Intravenous Q12H Antonio Hernandez, APRN .7 mL/hr at 01/15/25 1205 1,250 mg at 01/15/25 1205              Allergies:     Allergies   Allergen Reactions    Levaquin Anaphylaxis and Rash      Swelling in lip and tongue felt thick    Lisinopril Anaphylaxis     Swollen tongue; inability to swallow; drooling; hives; swollen face, neck, angioedema    Acetaminophen      Hx of cirrhosis     Amlodipine      Swelling, hives, possible angioedema      Morphine      b/p dropped and arms went numb  Hypotension    Quinolones Hives    Spironolactone Unknown     Pt believes himself to be allergic to it; cannot find his old records of this.-mjc     Bactrim [Sulfamethoxazole-Trimethoprim] Rash          Labs:     Recent Results (from the past 48 hours)   Glucose by meter    Collection Time: 01/13/25  1:28 PM   Result Value Ref Range    GLUCOSE BY METER POCT 125 (H) 70 - 99 mg/dL   Basic metabolic panel    Collection Time: 01/13/25  2:11 PM   Result Value Ref Range    Sodium 140 135 - 145 mmol/L    Potassium 3.1 (L) 3.4 - 5.3 mmol/L    Chloride 106 98 - 107 mmol/L    Carbon Dioxide (CO2) 23 22 - 29 mmol/L    Anion Gap 11 7 - 15 mmol/L    Urea Nitrogen 19.8 8.0 - 23.0 mg/dL    Creatinine 0.45 (L) 0.67 - 1.17 mg/dL    GFR Estimate >90 >60 mL/min/1.73m2    Calcium 8.2 (L) 8.8 - 10.4 mg/dL    Glucose 155 (H) 70 - 99 mg/dL   Glucose by meter    Collection Time: 01/13/25  4:41 PM   Result Value Ref Range    GLUCOSE BY METER POCT 129 (H) 70 - 99 mg/dL   Glucose by meter    Collection Time: 01/13/25  7:30 PM   Result Value Ref Range    GLUCOSE BY METER POCT 169 (H) 70 - 99 mg/dL   Glucose by meter    Collection Time: 01/13/25 10:05 PM   Result Value Ref Range    GLUCOSE BY METER POCT 137 (H) 70 - 99 mg/dL   Potassium    Collection Time: 01/13/25 10:07 PM   Result Value Ref Range    Potassium 3.6 3.4 - 5.3 mmol/L   Glucose by meter    Collection Time: 01/14/25 12:20 AM   Result Value Ref Range    GLUCOSE BY METER POCT 133 (H) 70 - 99 mg/dL   Glucose by meter    Collection Time: 01/14/25  3:20 AM   Result Value Ref Range    GLUCOSE BY METER POCT 151 (H) 70 - 99 mg/dL   Magnesium    Collection Time: 01/14/25  6:22 AM   Result Value  Ref Range    Magnesium 1.7 1.7 - 2.3 mg/dL   Basic metabolic panel    Collection Time: 01/14/25  6:22 AM   Result Value Ref Range    Sodium 141 135 - 145 mmol/L    Potassium 3.3 (L) 3.4 - 5.3 mmol/L    Chloride 108 (H) 98 - 107 mmol/L    Carbon Dioxide (CO2) 21 (L) 22 - 29 mmol/L    Anion Gap 12 7 - 15 mmol/L    Urea Nitrogen 19.2 8.0 - 23.0 mg/dL    Creatinine 0.45 (L) 0.67 - 1.17 mg/dL    GFR Estimate >90 >60 mL/min/1.73m2    Calcium 8.5 (L) 8.8 - 10.4 mg/dL    Glucose 166 (H) 70 - 99 mg/dL   CBC with platelets    Collection Time: 01/14/25  6:22 AM   Result Value Ref Range    WBC Count 15.3 (H) 4.0 - 11.0 10e3/uL    RBC Count 3.00 (L) 4.40 - 5.90 10e6/uL    Hemoglobin 9.0 (L) 13.3 - 17.7 g/dL    Hematocrit 27.4 (L) 40.0 - 53.0 %    MCV 91 78 - 100 fL    MCH 30.0 26.5 - 33.0 pg    MCHC 32.8 31.5 - 36.5 g/dL    RDW 17.4 (H) 10.0 - 15.0 %    Platelet Count 461 (H) 150 - 450 10e3/uL   Phosphorus    Collection Time: 01/14/25  6:22 AM   Result Value Ref Range    Phosphorus 2.7 2.5 - 4.5 mg/dL   Vancomycin level    Collection Time: 01/14/25  6:22 AM   Result Value Ref Range    Vancomycin 10.1   ug/mL   Glucose by meter    Collection Time: 01/14/25  7:45 AM   Result Value Ref Range    GLUCOSE BY METER POCT 177 (H) 70 - 99 mg/dL   Blood Culture Hand, Right    Collection Time: 01/14/25 10:30 AM    Specimen: Hand, Right; Blood   Result Value Ref Range    Culture No growth after 1 day    Blood Culture Hand, Left    Collection Time: 01/14/25 10:35 AM    Specimen: Hand, Left; Blood   Result Value Ref Range    Culture No growth after 1 day    Glucose by meter    Collection Time: 01/14/25 12:04 PM   Result Value Ref Range    GLUCOSE BY METER POCT 163 (H) 70 - 99 mg/dL   Potassium    Collection Time: 01/14/25 12:11 PM   Result Value Ref Range    Potassium 3.5 3.4 - 5.3 mmol/L   UA Macroscopic with reflex to Microscopic and Culture    Collection Time: 01/14/25  4:14 PM    Specimen: Urine, Clean Catch   Result Value Ref Range     Color Urine Light Yellow Colorless, Straw, Light Yellow, Yellow    Appearance Urine Clear Clear    Glucose Urine Negative Negative mg/dL    Bilirubin Urine Negative Negative    Ketones Urine Negative Negative mg/dL    Specific Gravity Urine 1.015 1.003 - 1.035    Blood Urine Negative Negative    pH Urine 7.0 5.0 - 7.0    Protein Albumin Urine Negative Negative mg/dL    Urobilinogen Urine Normal Normal, 2.0 mg/dL    Nitrite Urine Negative Negative    Leukocyte Esterase Urine Negative Negative   Glucose by meter    Collection Time: 01/14/25  4:27 PM   Result Value Ref Range    GLUCOSE BY METER POCT 145 (H) 70 - 99 mg/dL   Glucose by meter    Collection Time: 01/14/25  8:33 PM   Result Value Ref Range    GLUCOSE BY METER POCT 149 (H) 70 - 99 mg/dL   Glucose by meter    Collection Time: 01/14/25 11:12 PM   Result Value Ref Range    GLUCOSE BY METER POCT 158 (H) 70 - 99 mg/dL   Glucose by meter    Collection Time: 01/15/25  4:09 AM   Result Value Ref Range    GLUCOSE BY METER POCT 208 (H) 70 - 99 mg/dL   Magnesium    Collection Time: 01/15/25  5:55 AM   Result Value Ref Range    Magnesium 1.9 1.7 - 2.3 mg/dL   Basic metabolic panel    Collection Time: 01/15/25  5:55 AM   Result Value Ref Range    Sodium 140 135 - 145 mmol/L    Potassium 3.5 3.4 - 5.3 mmol/L    Chloride 108 (H) 98 - 107 mmol/L    Carbon Dioxide (CO2) 22 22 - 29 mmol/L    Anion Gap 10 7 - 15 mmol/L    Urea Nitrogen 20.0 8.0 - 23.0 mg/dL    Creatinine 0.50 (L) 0.67 - 1.17 mg/dL    GFR Estimate >90 >60 mL/min/1.73m2    Calcium 8.4 (L) 8.8 - 10.4 mg/dL    Glucose 212 (H) 70 - 99 mg/dL   CBC with platelets    Collection Time: 01/15/25  5:55 AM   Result Value Ref Range    WBC Count 13.6 (H) 4.0 - 11.0 10e3/uL    RBC Count 3.03 (L) 4.40 - 5.90 10e6/uL    Hemoglobin 9.1 (L) 13.3 - 17.7 g/dL    Hematocrit 28.7 (L) 40.0 - 53.0 %    MCV 95 78 - 100 fL    MCH 30.0 26.5 - 33.0 pg    MCHC 31.7 31.5 - 36.5 g/dL    RDW 17.8 (H) 10.0 - 15.0 %    Platelet Count 458 (H) 150  "- 450 10e3/uL   Glucose by meter    Collection Time: 01/15/25  7:38 AM   Result Value Ref Range    GLUCOSE BY METER POCT 198 (H) 70 - 99 mg/dL   Glucose by meter    Collection Time: 01/15/25 11:45 AM   Result Value Ref Range    GLUCOSE BY METER POCT 164 (H) 70 - 99 mg/dL          Physical and Psychiatric Examination:     /71 (BP Location: Right arm)   Pulse 78   Temp 98.6  F (37  C) (Axillary)   Resp 20   Ht 1.803 m (5' 11\")   Wt 89.1 kg (196 lb 6.9 oz)   SpO2 96%   BMI 27.40 kg/m    Weight is 196 lbs 6.88 oz  Body mass index is 27.4 kg/m .    Physical Exam:  I have reviewed the physical exam as documented by by the medical team and agree with findings and assessment and have no additional findings to add at this time.         MSE:   Appearance: awake, alert and adequately groomed, R BKA noted  Attitude:  cooperative  Eye Contact:  good  Mood:  \"fair\"  Affect:  intensity is dramatic  Speech:  rambling  Psychomotor Behavior:  no evidence of tardive dyskinesia, dystonia, or tics  Muscle strength and tone: intact   Thought Process:  tangental  Associations:  loosening of associations present  Thought Content:  no evidence of suicidal ideation or homicidal ideation and no evidence of psychotic thought  Insight:  fair  Judgement:  limited  Oriented to:  time, person, and place  Attention Span and Concentration:  fair  Recent and Remote Memory:  not formally assessed       QTc: 525 ns 1/13         DSM-5 Diagnosis:   311 (F32.8) Other/unspec. Depressive Disorder  300.01 (F41.0) Panic Disorder  300.02 (F41.1) Generalized Anxiety Disorder  Delirium   Cannabis use disorder,, alcohol use disorder in remission           Assessment:   Treatment of Delirium is a bit challenging with a prolonged QTc, but there are options. Fortunately his agitation is not very severe.  PTA he was on gabapentin 600 mg in afternoon, 900 mg HS: since his renal function is ok you could restart.             Summary of Recommendations: " "  Try Abilify 5 mg BID, as he settles down you can go to once a day for several days before discontinuing   Rozerem (ramelteon) 8 mg HS   Continue Zoloft 200 mg per day   Consider restarting gabapentin 300 mg afternoon, 600 mg HS, then up to PTA dose.    Contact me as needed.  Roland Alvarez M.D.   Consult Liaison Psychiatry   [facility]   Contact on [facility]  If I am not available, then [facility] CL line ([phone]) should know who   Is covering our consult service.               \"This dictation was performed with voice recognition software and may contain errors,  omissions and inadvertent word substitution.\"           "

## 2025-01-15 NOTE — PROGRESS NOTES
CVTS Progress Note  01/15/2025         Assessment and Plan:     Erwin Aguilar is a 64 year old male with a past medical history of DM2, HTN, PVD, and neuropathic pain who initially presented on 12/31/24 to the ED with arm pain, vomiting, and diarrhea; ACS and ST depression. He subsequently had VT arrest with 1 round of CPR, epinephrine, defibrillation. After ROSC was found to be in afib with RVR. Became hypotensive and was subsequently cannulated for VA ECMO. Found to have multivessel disease s/p CABG x4 on 1/2/25 with Dr. Rosenbaum. ECMO decannulated on 1/3/25 at bedside. ICU course complicated by large R PTX seen on chest CT on 1/4/25 s/p bedside CT placement, VAP, and encephalopathy. Extubated 1/11/25.       Cardiovascular:   Hx of CAD - s/p CABG x4 with Dr. Rosenbaum   Mixed shock - cardiogenic, vasoplegic, resolved   HTN  Pre-op VT Cardiac Arrest on VA ECMO (1/1 - 1/3)  HLD  Prolonged Qtc  Hx PAF on DOAC PTA - s/p MAZE & LAAC  No arrhythmias overnight, HD stable, in NSR.   Post operative ECHO 1/5 with LVEF 55-60%, normal RV function, no pericardial effusion   Will need repeat echo prior to discharge in setting of therapeutic AC  -  mg daily  - Rosuvastatin 40 mg daily   - Metoprolol 37.5 mg BID   - Losartan 25mg daily  - Diuresis as below  - 1/13 QTc: 525 (536)     Chest tubes: Removed in ICU   TPW: Removed in ICU     Pulmonary:  Acute Hypoxic Respiratory failure, resolved   Respiratory alkalosis 2:2 agitation - resolved   R tension pneumothorax, resolved   VAP  SubQ emphysema   Extubated POD 9. Now saturating well on RA.  - Supplemental O2 PRN to keep sats > 92%  - Pulm hygiene, IS, OOB/activity and deep breathing  - ABX detailed below   - PRN Mucomyst and Duoneb    Neurology:  Acute post-operative pain, improved  Encephalopathy  Delirium   Anxiety   Chronic pain with opioid dependence  Neuropathy, PTA  Insomnia   Pain controlled with current regimen:  - Acetaminophen  - 1/1 vEEG without seizure activity  (severe encephalopathy) and 12/31, 1/4, and 1/7 CTH negative for acute findings.   - PTA Sertraline  - Discontinue PRN hydroxyzine in s/o delirium   - PO oxycodone PRN  - PTA baclofen   - Increase melatonin at bedtime  - Delirium precautions; wean sitter as able  - Psych consulted for encephalopathy/delirium recs in s/o prolonged QTc; appreciate assistance     / Renal / Fluid / Electrolytes:  Hypernatremia, resolved   AUSTIN/ATN, resolved  Hypervolemia, improving  Urinary retention, resolved  Hypokalemia, improving  BL creat ~ 0.6-0.8, creatinine at baseline; adequate UOP.   - Diuresis: 60 mg IV Lasix BID  - 60 meq of potassium BID x5 days  - 200 mg mag ox daily x5 days  - Replete lytes per protocol  - Strict I/O, daily weights  - Avoid/limit nephrotoxins as able    GI / Nutrition:   Dysphagia   Severe malnutrition in the context of acute on chronic illness   Liquid stools   - SLP following  Orders Placed This Encounter      Pureed Diet (level 4) Thin Liquids (level 0)  - TF @ goal via NJ; consider transition to cycled feeds in coming days in order to stimulate appetite  - Dietitian consulted and following, appreciate assistance  - PRN bowel regimen  - PRN Imodium 2 mg QID PRN     Endocrine:  Stress-induced hyperglycemia, resolved  Type 2 diabetes mellitus   Hgb A1c 5.7  - Initially managed on insulin drip postop, transitioned to sliding scale & Lantus  - Goal BG <180 for optimal healing  - Lantus (PTA med) increased to 28 units daily     Infectious Disease:  Stress-induced leukocytosis, resolved  Sternal wound infection  Aspiration PNA: Klebsiella pneumoniae   WBC downtrending, afebrile  - Completed perioperative antibiotics  - Continue to monitor fever curve, CBC  - 1/14 infectious work-up for worsening leukocytosis negative thus far; follow for final results   - 1/14 BLE US neg for DVT  - Klebsiella pneumoniae from sputum cultures on 12/31 and 1/9  - Zosyn and vancomycin were restarted 1/9 for fever. Sputum  culture again grew Klebsiella pneumoniae.   - 1/11 noted to have small puss drainage from sternotomy    - Continue Vanc/Zosyn per Dr. Rosenbaum, tentative plan for 10-day course (end 1/18).    - CT chest on 1/12/25, not concerning for deep infection    Hematology:   Acute blood loss anemia  Thrombocytopenia, resolved  Thrombocytosis, likely reactive  Therapeutic AC for PAF  Hgb stable; Plt 458 (461), no signs or symptoms of active bleeding  - CBC daily    Anticoagulation:   - ASA 81 mg   - Resume PTA apixaban for hx of PAF now s/p MAZE and LAAC; defer long-term AC plan to OP Cardiology    MSK/Skin:  Sternotomy  Surgical incision  Hx RLE BKA  - Sternal precautions  - Incisional cares per protocol    Prophylaxis:   - Stress ulcer prophylaxis: Pantoprazole 40 mg daily for 30 days  - DVT prophylaxis: therapeutic AC, OOB/ambulation, SCD    Disposition:   - Transferred to  1/12  - Therapies recommending discharge to TCU pending medical stability, weaning from sitter    Dr. Rosenbaum updated.    Katt Sprague, CNP, ACNPC-  Cardiothoracic Surgery  [employer] Pager [phone]          Interval History:     Continues on sitter, argumentative/combative/refusing cares at times.  Pain controlled. Reports he hasn't been able to sleep and he finds the bed uncomfortable but also refusing to get up into the chair.  Becomes agitated stating that a woman is coming to get him or that he will walk out the front door  Tolerating tube feeds, minimal appetite, + BM.   No nausea, vomiting, or abdominal pain; complains of having to have bowel movements frequently.  Denies SOB.  Denies sternal popping/clicking.         Physical Exam:     Gen: NAD, resting in bed, sitter at bedside, restless at times  Neuro: Alert & grossly oriented but confused, no focal deficits, face symmetric, speech clear, OGLESBY  CV: RRR on monitor - SR  Pulm: unlabored breathing on RA, occasional productive cough  Abd: SNDNT, no guarding or peritoneal signs  Ext: moderate  peripheral edema, old sanguinous drainage & hematoma from distal LLE EVH site, well healed RLE BKA stump, moderate pitting LE edema, R groin incision C/I with sutures in place and trace serous drainage  Incision: clean, dry, intact, no erythema or induration, sternum stable  Tubes/drain sites: CYRIL, scabbed over, no erythema or induration         Data:    Imaging:      Recent Results (from the past 24 hours)   US Lower Extremity Venous Duplex Bilateral    Narrative    EXAMINATION: DOPPLER VENOUS ULTRASOUND OF BILATERAL LOWER EXTREMITIES,  1/14/2025 2:03 PM     COMPARISON: None.    HISTORY: r/o DVT in s/o leukocytosis    TECHNIQUE:  Gray-scale evaluation with compression, spectral flow and  color Doppler assessment of the deep venous system of both legs from  groin to knee, and then at the ankles.    FINDINGS:  In the left lower extremity, the common femoral, femoral, popliteal  and posterior tibial veins demonstrate normal compressibility and  blood flow.    In the right lower extremity, the common femoral, femoral, and  popliteal vein demonstrate normal compressibility and blood flow.  Amputation below the knee on the right, no posterior tibial vein to  assess.      Impression    IMPRESSION:  1.  No evidence of deep venous thrombosis in either lower extremity.  2.  Right below the knee amputation.    I have personally reviewed the examination and initial interpretation  and I agree with the findings.    MAGGIE PAULINO MD         SYSTEM ID:  [id number]          Labs:  Recent Labs   Lab 01/15/25  1145 01/15/25  0738 01/15/25  0555 01/14/25  1627 01/14/25  1211 01/14/25  0745 01/14/25  0622 01/13/25  1641 01/13/25  1411 01/13/25  0836 01/13/25  0425   WBC  --   --  13.6*  --   --   --  15.3*  --   --   --  12.3*   HGB  --   --  9.1*  --   --   --  9.0*  --   --   --  8.7*   MCV  --   --  95  --   --   --  91  --   --   --  95   PLT  --   --  458*  --   --   --  461*  --   --   --  425   NA  --   --  140  --   --   --   141  --  140  --  142   POTASSIUM  --   --  3.5  --  3.5  --  3.3*   < > 3.1*  --  3.0*   CHLORIDE  --   --  108*  --   --   --  108*  --  106  --  108*   CO2  --   --  22  --   --   --  21*  --  23  --  23   BUN  --   --  20.0  --   --   --  19.2  --  19.8  --  19.5   CR  --   --  0.50*  --   --   --  0.45*  --  0.45*  --  0.49*   ANIONGAP  --   --  10  --   --   --  12  --  11  --  11   [place]  --   --  8.4*  --   --   --  8.5*  --  8.2*  --  8.0*   * 198* 212*   < >  --    < > 166*   < > 155*   < > 179*    < > = values in this interval not displayed.      GLUCOSE:   Recent Labs   Lab 01/15/25  1145 01/15/25  0738 01/15/25  0555 01/15/25  0409 01/14/25  2312 01/14/25  2033   * 198* 212* 208* 158* 149*

## 2025-01-15 NOTE — PLAN OF CARE
D: PMH of DM2, HTN, PVD, and neuropathic pain. Presented with arm pain; ACS and ST depression. He had a VT arrest requiring ECMO. S/p CABG x4 on 1/2/25.    A:   Neuro: A/Ox2-4. Intermittently disoriented to place, time, and situation. Restless throughout night and trying to get up. Occasionally agitated with staff when needing to be repositioned or cleaned up. Redirectable. C/o anxiety, PRN Atarax given x1. Denies headache, dizziness, and lightheadedness.  Cardiac/Tele: SR. Denies chest pain and palpitations. Afebrile.  Respiratory: Sats >92% on room air. CHAVEZ. Congested/coarse lung sounds.   GI/: Primofit in place with adequate urine output. Continues to have loose/watery stools. Rectal tube remains in place with occasional leak. PRN Immodium given x1. C/o nausea, PRN Zofran given x1 with relief.  Diet/Appetite: Pureed diet. Q4 hr BG.  Skin: No new deficits noted.   LDAs: L PIV- TKO.  Activity: 2 assist/lift.   Pain: C/o generalized discomfort. PRN Tylenol given x1.     P: Continue to monitor and notify CVTS with changes.    Shift: [id number]

## 2025-01-15 NOTE — CARE PLAN
[id number]    Neuro: Oriented to self, time, and sometimes to place. Combative and agitated; frequently declined repositioning and other interventions such as vital signs and IV insertion; difficult to redirect. Olanzpine given x2 with improvement in behavior.  Cardiac/Tele: SR. Afebrile. Syst goal of <140.   Respiratory: Room air; SpO2 > 92%.Coarse lung sounds; congested cough.  GI/: Rectal tube removed; voiding spontaneously. PRN Immodium given x2.   NJ 60ml/hr with Q4H 30mL FWF  Diet/Appetite: Pureed diet; poor appetite.   Endo: Q4 hr BG.  Skin: No new deficits noted. Blanchable redness on buttocks; left harvest site UTV (team changed dressing today); right groin ECMO; old CT sites  LDAs: Left peripheral IV running NS to keep open for intermittent abx. Plan to insert PICC today but unable to obtain consent from family today.  Activity: 2 assist/Lift. Right BKA. Declined physical therapy today.  Pain: Denied.     P: Continue to monitor and notify CVTS with changes.

## 2025-01-15 NOTE — PROGRESS NOTES
CLINICAL NUTRITION SERVICES - REASSESSMENT NOTE     Nutrition Prescription    RECOMMENDATIONS FOR MDs/PROVIDERS TO ORDER:  FWF adjustments per team.    Malnutrition Status:    Moderate malnutrition in the context of chronic illness.    Recommendations already ordered by Registered Dietitian (RD):  Ordered Ensure Enlive chocolate BID at 10 am and 2 pm + supplements PRN    Continue TF as ordered: Vital 1.5 Lalito @ goal of 60ml/hr (1440ml/day) + 2 pkt Prosource TF20 will provide: 2320 kcals (29 kcal/kg), 137 g PRO (1.7 g/kg), 1100 ml free H20, 269 g CHO, and 8 g fiber daily.     Future/Additional Recommendations:  Monitor oral intake, supplement preferences, diet adv per SLP.  Monitor ongoing TF tolerance, lytes, GI/stooling, weights.  May consider ordering calorie counts and cycling TF as PO improves:  Vital 1.5 Lalito @ goal of  60ml/hr x18 hours + 1 pkt Prosource TF20 (1080ml/day)  will provide: 23171 kcals, 92 g PRO, 825 ml free H20, 201 g CHO, and 6 g fiber daily. Meets ~ 75% of estimated nutrition needs      EVALUATION OF THE PROGRESS TOWARD GOALS   Diet: Level 4: Pureed Dysphagia Diet (1/13-current), Full liquid (1/11-1/13), NPO previously  Intake: Pt consumed 0% of 1 meal per flowsheet. Pt ordered 2 meals yesterday per [employer] [facility].    Nutrition Support: TF at goal via NDT (placed 1/6)  1/6-10: Pivot 1.5 Lalito (or equivalent) @ goal of  55ml/hr  (1320ml/day) + 1 Prosource TF20 provides: 2060 kcals (26 kcal/kg), 144 g PRO (1.8 g pro/kg), 990 ml free H20, 227 g CHO, and 9 g fiber daily.   1/10-__: Vital 1.5 Lalito @ goal of 60ml/hr (1440ml/day) + 2 pkt Prosource TF20 will provide: 2320 kcals (29 kcal/kg), 137 g PRO (1.7 g/kg), 1100 ml free H20, 269 g CHO, and 8 g fiber daily.   Intake: Pt receiving a 7 day daily avg of 1318 mL of TF, 1.71 pkts Prosource TF20, 2114 kcals, 137 g protein. This met 100% of kcal and protein needs.    NEW FINDINGS   Met with pt and his wife. Sitter in room. Pt was disoriented/agitated, so spoke  with wife. She reports that pt is unhappy with purees. Ricardoter says pt is only drinking liquids. Pt is unhappy with hospital course in general. Pt was a grazer at home. He likes crackers. Wife got him pudding and regular yogurt to try. Pt likes Premier Protein at home. She thinks he'll try chocolate Ensure. Discussed SLP to continue reassessing pt's swallow ability/diet advancement and cycling TF once pt's PO improves.    Weight:  Last wt (1/13): 89.1 kg  Wt up 13.1 kg from admit wt  PTA wt loss no longer significant as wt is currently elevated    Labs:  Cr 0.5 (L).  Glucose 212 (H). 12/31 A1C 5.7 (H).    Medications:  Tums  Banatrol TF BID  Lasix BID  Novolog - high insulin resistance  Lantus  Thera-vit  Potassium chloride 60 mEq BID x 5 doses  PRN zofran, senna, miralax    GI:  Rectal tube in place. Output between 100-1175 ml/day this week per I/O    Skin: RLE BKA    Respiratory: extubated 1/11    Cardio: s/p CABG x4 on 1/2/25    Endo: DM2    MALNUTRITION  % Intake: No decreased intake noted  % Weight Loss: Weight loss does not meet criteria  Subcutaneous Fat Loss: Facial region:  Mild and Upper arm:  Mild*  Muscle Loss: Temporal:  Moderate-Severe, Thoracic region (clavicle, acromium bone, deltoid, trapezius, pectoral):  Moderate, Upper arm (bicep, tricep):  Mild, Lower arm  (forearm):  Mild, and Posterior calf (L - has R BKA):  Mild-moderate*  Fluid Accumulation/Edema: trace-mild  Malnutrition Diagnosis: Moderate malnutrition in the context of chronic illness.  *per last assessment. Deferred NFPE today as pt was agitated.    Previous Goals   Total avg nutritional intake to meet a minimum of 25 kcal/kg and 1.5 g PRO/kg daily (per dosing wt 79 kg).  Evaluation: Met    Previous Nutrition Diagnosis  Inadequate oral intake related to NPO status in setting of intubation as evidenced by ongoing need for EN support to meet full nutrition needs at this time.     Evaluation: updated    CURRENT NUTRITION  "DIAGNOSIS  Inadequate oral intake related to decreased appetite and dislike of dysphagia diet as evidenced by wife/sitter report and reliance on TF to meet 100% of nutrition needs.      INTERVENTIONS  Implementation  Enteral Nutrition - continue  Medical food supplement therapy    Goals  Total avg nutritional intake to meet a minimum of 25 kcal/kg and 1.5 g PRO/kg daily (per dosing wt 79 kg).    Monitoring/Evaluation  Progress toward goals will be monitored and evaluated per protocol.    Marquise Mariano, KIRAN, LD  Available on [email or web address]  Weekend/Holiday RD Shannan - \"Weekend Clinical Dietitian\"  No longer available by paging   "

## 2025-01-16 ENCOUNTER — APPOINTMENT (OUTPATIENT)
Dept: GENERAL RADIOLOGY | Facility: CLINIC | Age: 65
DRG: 003 | End: 2025-01-16
Attending: SURGERY
Payer: MEDICARE

## 2025-01-16 ENCOUNTER — APPOINTMENT (OUTPATIENT)
Dept: SPEECH THERAPY | Facility: CLINIC | Age: 65
DRG: 003 | End: 2025-01-16
Payer: MEDICARE

## 2025-01-16 LAB
ANION GAP SERPL CALCULATED.3IONS-SCNC: 10 MMOL/L (ref 7–15)
BUN SERPL-MCNC: 27.3 MG/DL (ref 8–23)
CALCIUM SERPL-MCNC: 8.6 MG/DL (ref 8.8–10.4)
CHLORIDE SERPL-SCNC: 108 MMOL/L (ref 98–107)
CREAT SERPL-MCNC: 0.58 MG/DL (ref 0.67–1.17)
EGFRCR SERPLBLD CKD-EPI 2021: >90 ML/MIN/1.73M2
ERYTHROCYTE [DISTWIDTH] IN BLOOD BY AUTOMATED COUNT: 18.2 % (ref 10–15)
GLUCOSE BLDC GLUCOMTR-MCNC: 129 MG/DL (ref 70–99)
GLUCOSE BLDC GLUCOMTR-MCNC: 137 MG/DL (ref 70–99)
GLUCOSE BLDC GLUCOMTR-MCNC: 147 MG/DL (ref 70–99)
GLUCOSE BLDC GLUCOMTR-MCNC: 180 MG/DL (ref 70–99)
GLUCOSE BLDC GLUCOMTR-MCNC: 183 MG/DL (ref 70–99)
GLUCOSE BLDC GLUCOMTR-MCNC: 188 MG/DL (ref 70–99)
GLUCOSE SERPL-MCNC: 230 MG/DL (ref 70–99)
HCO3 SERPL-SCNC: 21 MMOL/L (ref 22–29)
HCT VFR BLD AUTO: 28.6 % (ref 40–53)
HGB BLD-MCNC: 9.5 G/DL (ref 13.3–17.7)
HOLD SPECIMEN: NORMAL
MAGNESIUM SERPL-MCNC: 2.2 MG/DL (ref 1.7–2.3)
MCH RBC QN AUTO: 30.6 PG (ref 26.5–33)
MCHC RBC AUTO-ENTMCNC: 33.2 G/DL (ref 31.5–36.5)
MCV RBC AUTO: 92 FL (ref 78–100)
PLATELET # BLD AUTO: 569 10E3/UL (ref 150–450)
POTASSIUM SERPL-SCNC: 4.1 MMOL/L (ref 3.4–5.3)
RBC # BLD AUTO: 3.1 10E6/UL (ref 4.4–5.9)
SODIUM SERPL-SCNC: 139 MMOL/L (ref 135–145)
WBC # BLD AUTO: 16.8 10E3/UL (ref 4–11)

## 2025-01-16 PROCEDURE — 71046 X-RAY EXAM CHEST 2 VIEWS: CPT

## 2025-01-16 PROCEDURE — 250N000009 HC RX 250: Performed by: SURGERY

## 2025-01-16 PROCEDURE — 92526 ORAL FUNCTION THERAPY: CPT | Mod: GN

## 2025-01-16 PROCEDURE — 250N000013 HC RX MED GY IP 250 OP 250 PS 637

## 2025-01-16 PROCEDURE — 36415 COLL VENOUS BLD VENIPUNCTURE: CPT

## 2025-01-16 PROCEDURE — 85048 AUTOMATED LEUKOCYTE COUNT: CPT

## 2025-01-16 PROCEDURE — 93005 ELECTROCARDIOGRAM TRACING: CPT

## 2025-01-16 PROCEDURE — 250N000011 HC RX IP 250 OP 636: Performed by: NURSE PRACTITIONER

## 2025-01-16 PROCEDURE — 272N000451 HC KIT SHRLOCK 5FR POWER PICC DOUBLE LUMEN

## 2025-01-16 PROCEDURE — 36569 INSJ PICC 5 YR+ W/O IMAGING: CPT

## 2025-01-16 PROCEDURE — 85014 HEMATOCRIT: CPT

## 2025-01-16 PROCEDURE — 250N000011 HC RX IP 250 OP 636: Performed by: SURGERY

## 2025-01-16 PROCEDURE — 272N000278 HC DEVICE 5FR SECURACATH

## 2025-01-16 PROCEDURE — 84295 ASSAY OF SERUM SODIUM: CPT

## 2025-01-16 PROCEDURE — 80048 BASIC METABOLIC PNL TOTAL CA: CPT

## 2025-01-16 PROCEDURE — 120N000003 HC R&B IMCU UMMC

## 2025-01-16 PROCEDURE — 71046 X-RAY EXAM CHEST 2 VIEWS: CPT | Mod: 26 | Performed by: RADIOLOGY

## 2025-01-16 PROCEDURE — 250N000011 HC RX IP 250 OP 636: Performed by: STUDENT IN AN ORGANIZED HEALTH CARE EDUCATION/TRAINING PROGRAM

## 2025-01-16 PROCEDURE — 83735 ASSAY OF MAGNESIUM: CPT | Performed by: STUDENT IN AN ORGANIZED HEALTH CARE EDUCATION/TRAINING PROGRAM

## 2025-01-16 PROCEDURE — 80051 ELECTROLYTE PANEL: CPT

## 2025-01-16 PROCEDURE — 250N000013 HC RX MED GY IP 250 OP 250 PS 637: Performed by: SURGERY

## 2025-01-16 PROCEDURE — 250N000013 HC RX MED GY IP 250 OP 250 PS 637: Performed by: NURSE PRACTITIONER

## 2025-01-16 RX ORDER — AMINO ACIDS/PROTEIN HYDROLYS 11G-40/45
1 LIQUID IN PACKET (ML) ORAL DAILY
Status: DISCONTINUED | OUTPATIENT
Start: 2025-01-17 | End: 2025-01-21

## 2025-01-16 RX ORDER — HEPARIN SODIUM,PORCINE 10 UNIT/ML
5-20 VIAL (ML) INTRAVENOUS
Status: DISCONTINUED | OUTPATIENT
Start: 2025-01-16 | End: 2025-01-25 | Stop reason: HOSPADM

## 2025-01-16 RX ORDER — HEPARIN SODIUM,PORCINE 10 UNIT/ML
5-20 VIAL (ML) INTRAVENOUS EVERY 24 HOURS
Status: DISCONTINUED | OUTPATIENT
Start: 2025-01-16 | End: 2025-01-25 | Stop reason: HOSPADM

## 2025-01-16 RX ORDER — FUROSEMIDE 10 MG/ML
40 INJECTION INTRAMUSCULAR; INTRAVENOUS
Status: DISCONTINUED | OUTPATIENT
Start: 2025-01-16 | End: 2025-01-18

## 2025-01-16 RX ORDER — ARIPIPRAZOLE 2 MG/1
2 TABLET ORAL 2 TIMES DAILY
Status: DISCONTINUED | OUTPATIENT
Start: 2025-01-16 | End: 2025-01-18

## 2025-01-16 RX ORDER — METOPROLOL TARTRATE 25 MG/1
50 TABLET, FILM COATED ORAL 2 TIMES DAILY
Status: DISCONTINUED | OUTPATIENT
Start: 2025-01-16 | End: 2025-01-25 | Stop reason: HOSPADM

## 2025-01-16 RX ADMIN — GABAPENTIN 600 MG: 300 CAPSULE ORAL at 23:06

## 2025-01-16 RX ADMIN — Medication 1250 MG: at 13:59

## 2025-01-16 RX ADMIN — BACLOFEN 10 MG: 10 TABLET ORAL at 23:26

## 2025-01-16 RX ADMIN — CALCIUM CARBONATE (ANTACID) CHEW TAB 500 MG 1000 MG: 500 CHEW TAB at 16:51

## 2025-01-16 RX ADMIN — ROSUVASTATIN CALCIUM 40 MG: 20 TABLET, FILM COATED ORAL at 09:00

## 2025-01-16 RX ADMIN — PIPERACILLIN SODIUM AND TAZOBACTAM SODIUM 3.38 G: 3; .375 INJECTION, SOLUTION INTRAVENOUS at 16:57

## 2025-01-16 RX ADMIN — PIPERACILLIN SODIUM AND TAZOBACTAM SODIUM 3.38 G: 3; .375 INJECTION, SOLUTION INTRAVENOUS at 03:41

## 2025-01-16 RX ADMIN — Medication 5 ML: at 10:38

## 2025-01-16 RX ADMIN — LIDOCAINE HYDROCHLORIDE ANHYDROUS 3.5 ML: 10 INJECTION, SOLUTION INFILTRATION at 09:45

## 2025-01-16 RX ADMIN — LOSARTAN POTASSIUM 25 MG: 25 TABLET, FILM COATED ORAL at 09:00

## 2025-01-16 RX ADMIN — CALCIUM CARBONATE (ANTACID) CHEW TAB 500 MG 1000 MG: 500 CHEW TAB at 08:59

## 2025-01-16 RX ADMIN — BACLOFEN 10 MG: 10 TABLET ORAL at 16:51

## 2025-01-16 RX ADMIN — BACLOFEN 10 MG: 10 TABLET ORAL at 08:59

## 2025-01-16 RX ADMIN — INSULIN ASPART 2 UNITS: 100 INJECTION, SOLUTION INTRAVENOUS; SUBCUTANEOUS at 23:06

## 2025-01-16 RX ADMIN — PIPERACILLIN SODIUM AND TAZOBACTAM SODIUM 3.38 G: 3; .375 INJECTION, SOLUTION INTRAVENOUS at 10:55

## 2025-01-16 RX ADMIN — APIXABAN 5 MG: 5 TABLET, FILM COATED ORAL at 09:00

## 2025-01-16 RX ADMIN — THERA TABS 1 TABLET: TAB at 08:59

## 2025-01-16 RX ADMIN — RAMELTEON 8 MG: 8 TABLET ORAL at 23:06

## 2025-01-16 RX ADMIN — Medication 60 ML: at 10:10

## 2025-01-16 RX ADMIN — SERTRALINE HYDROCHLORIDE 200 MG: 100 TABLET ORAL at 09:00

## 2025-01-16 RX ADMIN — INSULIN ASPART 2 UNITS: 100 INJECTION, SOLUTION INTRAVENOUS; SUBCUTANEOUS at 09:08

## 2025-01-16 RX ADMIN — ARIPIPRAZOLE 2 MG: 2 TABLET ORAL at 20:41

## 2025-01-16 RX ADMIN — INSULIN ASPART 2 UNITS: 100 INJECTION, SOLUTION INTRAVENOUS; SUBCUTANEOUS at 03:41

## 2025-01-16 RX ADMIN — ARIPIPRAZOLE 2 MG: 2 TABLET ORAL at 13:58

## 2025-01-16 RX ADMIN — ASPIRIN 81 MG CHEWABLE TABLET 81 MG: 81 TABLET CHEWABLE at 09:00

## 2025-01-16 RX ADMIN — INSULIN ASPART 1 UNITS: 100 INJECTION, SOLUTION INTRAVENOUS; SUBCUTANEOUS at 13:58

## 2025-01-16 RX ADMIN — Medication 37.5 MG: at 09:00

## 2025-01-16 RX ADMIN — POTASSIUM CHLORIDE 60 MEQ: 1.5 POWDER, FOR SOLUTION ORAL at 09:00

## 2025-01-16 RX ADMIN — METOPROLOL TARTRATE 50 MG: 25 TABLET, FILM COATED ORAL at 20:41

## 2025-01-16 RX ADMIN — FUROSEMIDE 40 MG: 10 INJECTION, SOLUTION INTRAMUSCULAR; INTRAVENOUS at 16:51

## 2025-01-16 RX ADMIN — GABAPENTIN 300 MG: 300 CAPSULE ORAL at 16:51

## 2025-01-16 RX ADMIN — FUROSEMIDE 40 MG: 10 INJECTION, SOLUTION INTRAMUSCULAR; INTRAVENOUS at 10:34

## 2025-01-16 RX ADMIN — PIPERACILLIN SODIUM AND TAZOBACTAM SODIUM 3.38 G: 3; .375 INJECTION, SOLUTION INTRAVENOUS at 23:27

## 2025-01-16 RX ADMIN — APIXABAN 5 MG: 5 TABLET, FILM COATED ORAL at 20:41

## 2025-01-16 RX ADMIN — Medication 1250 MG: at 00:09

## 2025-01-16 RX ADMIN — POTASSIUM CHLORIDE 60 MEQ: 1.5 POWDER, FOR SOLUTION ORAL at 20:41

## 2025-01-16 ASSESSMENT — ACTIVITIES OF DAILY LIVING (ADL)
ADLS_ACUITY_SCORE: 64

## 2025-01-16 NOTE — PROCEDURES
[facility]    Double Lumen PICC Placement    Date/Time: 1/16/2025 10:09 AM    Performed by: Danica Orta RN  Authorized by: Katt Sprague NP  Indications: vascular access      UNIVERSAL PROTOCOL   Site Marked: Yes  Prior Images Obtained and Reviewed:  Yes  Required items: Required blood products, implants, devices and special equipment available    Patient identity confirmed:  Verbally with patient, arm band, provided demographic data and hospital-assigned identification number  Patient was reevaluated immediately before administering moderate or deep sedation or anesthesia  Confirmation Checklist:  Patient's identity using two indicators, relevant allergies, procedure was appropriate and matched the consent or emergent situation and correct equipment/implants were available  Time out: Immediately prior to the procedure a time out was called    Universal Protocol: the [facility] Universal Protocol was followed    Preparation: Patient was prepped and draped in usual sterile fashion       ANESTHESIA    Anesthesia:  Local infiltration  Local Anesthetic:  Lidocaine 1% without epinephrine  Anesthetic Total (mL):  3.5      SEDATION    Patient Sedated: No        Preparation: skin prepped with ChloraPrep  Skin prep agent: skin prep agent completely dried prior to procedure  Sterile barriers: maximum sterile barriers were used: cap, mask, sterile gown, sterile gloves, and large sterile sheet  Hand hygiene: hand hygiene performed prior to central venous catheter insertion  Type of line used: PICC  Catheter type: double lumen  Lumen type: non-valved and power PICC  Lumen Identification: Purple and Red  Catheter size: 5 Fr  Brand: Bard  Lot number: [id number]  Placement method: venipuncture, MST, ultrasound and tip navigation system  Number of attempts: 1  Difficulty threading catheter: no  Successful placement: yes  Orientation: left  Catheter to Vein (%):  26  Location: brachial vein (lateral) (0.68 cm vein diameter)  Tip Location: SVC  Arm circumference: adults 10 cm  Extremity circumference: 28  Visible catheter length: 3  Total catheter length: 47  Dressing and securement: glue, gloves changed prior to final dressing, alcohol impregnated caps, blood cleaned with CHG, chlorhexidine disc applied, site cleansed, subcutaneous anchor securement system, transparent dressing and sterile dressing applied  Post procedure assessment: placement verified by 3CG technology, free fluid flow and blood return through all ports  PROCEDURE   Patient Tolerance:  Patient tolerated the procedure well with no immediate complicationsDescribe Procedure: PICC was 3cg verified, ready to use.  Disposal: sharps and needle count correct at the end of procedure, needles and guidewire disposed in sharps container         Media Information      Document Information    Other: Photograph   PICC 3CG tip confirmation   01/16/2025 9:48 AM   Attached To:   Hospital Encounter on 12/31/24   Source Information    Danica Orta RN  Uu U6   Document History

## 2025-01-16 NOTE — PROGRESS NOTES
Care Management Follow Up    Length of Stay (days): 16    Expected Discharge Date: 01/18/2025     Concerns to be Addressed: discharge planning    Patient plan of care discussed at interdisciplinary rounds: Yes    Anticipated Discharge Disposition: TCU  Anticipated Discharge Services: TCU therapy services  Anticipated Discharge DME: n/a    Patient/family educated on Medicare website which has current facility and service quality ratings: no  Education Provided on the Discharge Plan: Yes  Patient/Family in Agreement with the Plan:  n/a    Referrals Placed by CM/SW:  n/a  Private pay costs discussed: Not applicable    Discussed  Partnership in Safe Discharge Planning  document with patient/family: No     Handoff Completed: No, handoff not indicated or clinically appropriate    Additional Information:  Pt is currently on a 1:1 sitter. Per provider during morning discharge rounds, team to start weaning pt off of 1:1 today.    Pt would need to be off of the 1:1 sitter and stable for at least 24 hours before placement is pursued/pt discharges.     Next Steps: once pt is off of 1:1 sitter, KALEIGH to discuss TCU with pt and significant other Isabella.     ___________________    TINO Nieves, LGSW  6C , covering beds 6401 to 6519  [facility]   Phone: [phone]  6C Cards KALEIGH [place]

## 2025-01-16 NOTE — PROGRESS NOTES
Isabella Pisano called back at 2039 and gave consent for Picc line to be placed. Withness by Sarah Alexander RN [facility].  Picc line to be done 01/16/2025

## 2025-01-16 NOTE — PLAN OF CARE
Neuro: Oriented to person, place, and year. Pt is still confused, compliant, and calm throughout the shift.  Cardiac: SR, SBP <140   Respiratory: RA, coarse lung sounds, congested cough  : Incontinent b/b  Diet/appetite: pureed diet, NG TF 60ml/hr continuous  Activity: Assist x2  Pain: reports mild pain  Skin: No significant changes  LDA's: LPIV, Plan to insert PICC but unable to obtain consent from family during previous shift.            Goal Outcome Evaluation:    Plan of Care Reviewed With: patient    Overall Patient Progress: improvingOverall Patient Progress: improving    Outcome Evaluation: Pt less confused, compliant, and easier to reorient. Sitter remains at bedside.

## 2025-01-16 NOTE — PROGRESS NOTES
CVTS Progress Note  01/16/2025         Assessment and Plan:     Erwin Aguilar is a 64 year old male with a past medical history of DM2, HTN, PVD, and neuropathic pain who initially presented on 12/31/24 to the ED with arm pain, vomiting, and diarrhea; ACS and ST depression. He subsequently had VT arrest with 1 round of CPR, epinephrine, defibrillation. After ROSC was found to be in afib with RVR. Became hypotensive and was subsequently cannulated for VA ECMO. Found to have multivessel disease s/p CABG x4 on 1/2/25 with Dr. Rosenbaum. ECMO decannulated on 1/3/25 at bedside. ICU course complicated by large R PTX seen on chest CT on 1/4/25 s/p bedside CT placement, VAP, and encephalopathy. Extubated 1/11/25.       Cardiovascular:   Hx of CAD - s/p CABG x4 with Dr. Rosenbaum   Mixed shock - cardiogenic, vasoplegic, resolved   HTN  Pre-op VT Cardiac Arrest on VA ECMO (1/1 - 1/3)  HLD  Prolonged Qtc  Hx PAF on DOAC PTA - s/p MAZE & LAAC  No arrhythmias overnight, HD stable, in NSR.   Post operative ECHO 1/5 with LVEF 55-60%, normal RV function, no pericardial effusion   Will need repeat echo prior to discharge in setting of therapeutic AC  -  mg daily  - Rosuvastatin 40 mg daily   - Increase metoprolol tartrate to 50 mg BID   - Losartan 25mg daily  - Diuresis as below  - AC as below  - 1/13 QTc: 525 (536), repeat EKG today for QT monitoring    Chest tubes: Removed in ICU   TPW: Removed in ICU     Pulmonary:  Acute Hypoxic Respiratory failure, resolved   Respiratory alkalosis 2:2 agitation - resolved   R tension pneumothorax, resolved   VAP  SubQ emphysema   Extubated POD 9. Now saturating well on RA.  - Supplemental O2 PRN to keep sats > 92%  - Pulm hygiene, IS, OOB/activity and deep breathing  - ABX detailed below   - PRN Mucomyst and Duoneb    Neurology:  Acute post-operative pain, improved  Encephalopathy  Delirium   Anxiety   Chronic pain with opioid dependence  Neuropathy, PTA  Insomnia   Pain controlled with current  regimen:  - Acetaminophen  - 1/1 vEEG without seizure activity (severe encephalopathy) and 12/31, 1/4, and 1/7 CTH negative for acute findings.   - PTA Sertraline  - Discontinue PRN hydroxyzine in s/o delirium   - PO oxycodone PRN frequency reduced  - PTA baclofen   - Psych consulted for encephalopathy/delirium recs in s/o prolonged QTc; appreciate assistance  - Melatonin discontinued in favor of HS ramelteon per Psych recs  - PTA gabapentin resumed at reduced dose, look to titrate to PTA dosing in the next couple of days  - Abilify BID for a time until encephalopathy improves before tapering to discontinuation  - Delirium precautions; wean sitter as able     / Renal / Fluid / Electrolytes:  Hypernatremia, resolved   AUSTIN/ATN, resolved  Hypervolemia, improving  Urinary retention, resolved  Hypokalemia, resolved  BL creat ~ 0.6-0.8, creatinine at baseline; adequate UOP.   - Diuresis: 60 mg IV Lasix BID  - 60 meq of potassium BID x5 days  - 200 mg mag ox daily x5 days  - Replete lytes per protocol  - Strict I/O, daily weights  - Avoid/limit nephrotoxins as able  - Lymphedema consult for consideration of compression stocking to LLE    GI / Nutrition:   Dysphagia   Severe malnutrition in the context of acute on chronic illness   Liquid stools   - SLP following; appreciate assistance with diet advancement  Orders Placed This Encounter      Pureed Diet (level 4) Thin Liquids (level 0)  - TF @ goal via NJ; transition to cycled feeds today in order to stimulate appetite  - Lalito counts 1/17-19  - Dietitian consulted and following, appreciate assistance  - PRN bowel regimen  - PRN Imodium 2 mg QID PRN     Endocrine:  Stress-induced hyperglycemia, resolved  Type 2 diabetes mellitus   Hgb A1c 5.7  - Initially managed on insulin drip postop, transitioned to sliding scale & Lantus  - Goal BG <180 for optimal healing  - Transition Lantus (PTA med) to 15 units BID    Infectious Disease:  Stress-induced leukocytosis,  resolved  Sternal wound infection, improving  Aspiration PNA: Klebsiella pneumoniae   WBC downtrending, afebrile  - Completed perioperative antibiotics  - Continue to monitor fever curve, CBC  - 1/14 infectious work-up for worsening leukocytosis negative thus far; follow for final results   - 1/14 BLE US neg for DVT  - Defer respiratory viral panel given stable on RA, afebrile  - Klebsiella pneumoniae from sputum cultures on 12/31 and 1/9  - Zosyn and vancomycin were restarted 1/9 for fever. Sputum culture again grew Klebsiella pneumoniae.   - 1/11 noted to have small puss drainage from sternotomy    - Continue Vanc/Zosyn per Dr. Rosenbaum, tentative plan for 10-day course (end 1/18).    - CT chest on 1/12/25, not concerning for deep infection    Hematology:   Acute blood loss anemia  Thrombocytopenia, resolved  Thrombocytosis, likely reactive  Therapeutic AC for PAF  Hgb stable; Plt 569 (458), no signs or symptoms of active bleeding  - CBC daily    Anticoagulation:   - ASA 81 mg   - PTA apixaban resumed for hx of PAF now s/p MAZE and LAAC; defer long-term AC plan to OP Cardiology    MSK/Skin:  Sternotomy  Surgical incision  Hx RLE BKA  - Sternal precautions  - Incisional cares per protocol    Prophylaxis:   - Stress ulcer prophylaxis: Pantoprazole 40 mg daily for 30 days  - DVT prophylaxis: therapeutic AC, OOB/ambulation, SCD    Disposition:   - Transferred to  1/12  - Therapies recommending discharge to TCU pending weaning from sitter - anticipate medical readiness in approx 2 days    Dr. Rosenbaum updated.    Katt Sprague, CNP, ACN-  Cardiothoracic Surgery  [employer] Pager [phone]          Interval History:     NAEO, states he slept decent.  Calm and cooperative; agreeable to POC today.  Pain controlled.   Tolerating tube feeds, some appetite, + BM, complains of ongoing diarrhea.   No nausea, vomiting, or abdominal pain.  Denies SOB.  Denies sternal popping/clicking.         Physical Exam:     Gen: NAD,  resting in bed, sitter at bedside, calm, cooperative on exam, conversant  Neuro: alert & grossly oriented, no focal deficits, face symmetric, speech clear, OGLESBY  CV: RRR on monitor - SR, S1, S2, no murmurs or rubs  Pulm: unlabored breathing on RA, occasional productive cough  Abd: SNDNT, no guarding or peritoneal signs, active BS  Ext: moderate peripheral edema, old sanguinous drainage & hematoma from distal LLE EVH site, well healed RLE BKA stump, moderate pitting LE edema, R groin incision C/D/I with sutures in place  Incision: clean, dry, intact, no erythema or induration, sternum stable  Tubes/drain sites: CYRIL, scabbed over, no erythema or induration         Data:    Imaging:      No results found for this or any previous visit (from the past 24 hours).         Labs:  Recent Labs   Lab 01/16/25  0732 01/16/25  0434 01/16/25  0332 01/15/25  2327 01/15/25  0738 01/15/25  0555 01/14/25  1627 01/14/25  1211 01/14/25  0745 01/14/25  0622   WBC  --  16.8*  --   --   --  13.6*  --   --   --  15.3*   HGB  --  9.5*  --   --   --  9.1*  --   --   --  9.0*   MCV  --  92  --   --   --  95  --   --   --  91   PLT  --  569*  --   --   --  458*  --   --   --  461*     --   --   --   --  140  --   --   --  141   POTASSIUM 4.1  --   --   --   --  3.5  --  3.5  --  3.3*   CHLORIDE 108*  --   --   --   --  108*  --   --   --  108*   CO2 21*  --   --   --   --  22  --   --   --  21*   BUN 27.3*  --   --   --   --  20.0  --   --   --  19.2   CR 0.58*  --   --   --   --  0.50*  --   --   --  0.45*   ANIONGAP 10  --   --   --   --  10  --   --   --  12   AFRICA 8.6*  --   --   --   --  8.4*  --   --   --  8.5*   *  --  188* 166*   < > 212*   < >  --    < > 166*    < > = values in this interval not displayed.      GLUCOSE:   Recent Labs   Lab 01/16/25  0732 01/16/25  0332 01/15/25  2327 01/15/25  2023 01/15/25  1601 01/15/25  1145   * 188* 166* 189* 172* 164*

## 2025-01-16 NOTE — PROGRESS NOTES
0700-1945    PMHx  64 year old male with a past medical history of DM2, HTN, PVD, and neuropathic pain who initially presented on 12/31/24 to the ED with arm pain, vomiting, and diarrhea; ACS and ST depression. He subsequently had VT arrest with 1 round of CPR, epinephrine, defibrillation. After ROSC was found to be in afib with RVR. Became hypotensive and was subsequently cannulated for VA ECMO. Found to have multivessel disease s/p CABG x4 on 1/2/25 with Dr. Rosenbaum. ECMO decannulated on 1/3/25 at bedside. ICU course complicated by large R PTX seen on chest CT on 1/4/25 s/p bedside CT placement, VAP, and encephalopathy.     Neuro: A&Ox4.   Cardiac: Afebrile, VSS.   Respiratory: RA   GI/: Voiding spontaneously Via Condom Catheter. 3 loose BMs this shift.   Diet/appetite: Cyclic TF starting tonight (8p-8a) at 80mL/hr. Pureed diet. No appetite, pt says he need to get the denture glue that he uses at home  Denies nausea   Activity: Up with assist  x2 ceiling lift, encouraged to Pivot but pt has not yet attempted this maneuver yet.  Pain: Denies   Skin: redness on coccyx  Lines: DL PICC, 1 PIV SL  Drains: None  Replacement: Potassium

## 2025-01-17 ENCOUNTER — APPOINTMENT (OUTPATIENT)
Dept: CT IMAGING | Facility: CLINIC | Age: 65
DRG: 003 | End: 2025-01-17
Attending: SURGERY
Payer: MEDICARE

## 2025-01-17 ENCOUNTER — APPOINTMENT (OUTPATIENT)
Dept: GENERAL RADIOLOGY | Facility: CLINIC | Age: 65
DRG: 003 | End: 2025-01-17
Attending: SURGERY
Payer: MEDICARE

## 2025-01-17 ENCOUNTER — APPOINTMENT (OUTPATIENT)
Dept: CARDIOLOGY | Facility: CLINIC | Age: 65
DRG: 003 | End: 2025-01-17
Attending: SURGERY
Payer: MEDICARE

## 2025-01-17 ENCOUNTER — APPOINTMENT (OUTPATIENT)
Dept: PHYSICAL THERAPY | Facility: CLINIC | Age: 65
DRG: 003 | End: 2025-01-17
Attending: SURGERY
Payer: MEDICARE

## 2025-01-17 LAB
ANION GAP SERPL CALCULATED.3IONS-SCNC: 9 MMOL/L (ref 7–15)
BUN SERPL-MCNC: 28.4 MG/DL (ref 8–23)
CALCIUM SERPL-MCNC: 8.3 MG/DL (ref 8.8–10.4)
CHLORIDE SERPL-SCNC: 107 MMOL/L (ref 98–107)
CREAT SERPL-MCNC: 0.66 MG/DL (ref 0.67–1.17)
EGFRCR SERPLBLD CKD-EPI 2021: >90 ML/MIN/1.73M2
ERYTHROCYTE [DISTWIDTH] IN BLOOD BY AUTOMATED COUNT: 18.2 % (ref 10–15)
GLUCOSE BLDC GLUCOMTR-MCNC: 121 MG/DL (ref 70–99)
GLUCOSE BLDC GLUCOMTR-MCNC: 129 MG/DL (ref 70–99)
GLUCOSE BLDC GLUCOMTR-MCNC: 133 MG/DL (ref 70–99)
GLUCOSE BLDC GLUCOMTR-MCNC: 157 MG/DL (ref 70–99)
GLUCOSE BLDC GLUCOMTR-MCNC: 164 MG/DL (ref 70–99)
GLUCOSE BLDC GLUCOMTR-MCNC: 208 MG/DL (ref 70–99)
GLUCOSE SERPL-MCNC: 168 MG/DL (ref 70–99)
HCO3 SERPL-SCNC: 22 MMOL/L (ref 22–29)
HCT VFR BLD AUTO: 28.5 % (ref 40–53)
HGB BLD-MCNC: 8.8 G/DL (ref 13.3–17.7)
LVEF ECHO: NORMAL
MAGNESIUM SERPL-MCNC: 2.1 MG/DL (ref 1.7–2.3)
MCH RBC QN AUTO: 30 PG (ref 26.5–33)
MCHC RBC AUTO-ENTMCNC: 30.9 G/DL (ref 31.5–36.5)
MCV RBC AUTO: 97 FL (ref 78–100)
PLATELET # BLD AUTO: 510 10E3/UL (ref 150–450)
POTASSIUM SERPL-SCNC: 4.1 MMOL/L (ref 3.4–5.3)
RBC # BLD AUTO: 2.93 10E6/UL (ref 4.4–5.9)
SODIUM SERPL-SCNC: 138 MMOL/L (ref 135–145)
WBC # BLD AUTO: 16.2 10E3/UL (ref 4–11)

## 2025-01-17 PROCEDURE — 255N000002 HC RX 255 OP 636: Performed by: INTERNAL MEDICINE

## 2025-01-17 PROCEDURE — 250N000011 HC RX IP 250 OP 636: Performed by: NURSE PRACTITIONER

## 2025-01-17 PROCEDURE — 250N000013 HC RX MED GY IP 250 OP 250 PS 637

## 2025-01-17 PROCEDURE — 250N000013 HC RX MED GY IP 250 OP 250 PS 637: Performed by: STUDENT IN AN ORGANIZED HEALTH CARE EDUCATION/TRAINING PROGRAM

## 2025-01-17 PROCEDURE — 70250 X-RAY EXAM OF SKULL: CPT | Mod: 26 | Performed by: RADIOLOGY

## 2025-01-17 PROCEDURE — 250N000011 HC RX IP 250 OP 636: Performed by: STUDENT IN AN ORGANIZED HEALTH CARE EDUCATION/TRAINING PROGRAM

## 2025-01-17 PROCEDURE — 250N000013 HC RX MED GY IP 250 OP 250 PS 637: Performed by: PHYSICIAN ASSISTANT

## 2025-01-17 PROCEDURE — 97530 THERAPEUTIC ACTIVITIES: CPT | Mod: GP

## 2025-01-17 PROCEDURE — 80048 BASIC METABOLIC PNL TOTAL CA: CPT

## 2025-01-17 PROCEDURE — 250N000011 HC RX IP 250 OP 636: Performed by: SURGERY

## 2025-01-17 PROCEDURE — 85048 AUTOMATED LEUKOCYTE COUNT: CPT

## 2025-01-17 PROCEDURE — 250N000009 HC RX 250: Performed by: SURGERY

## 2025-01-17 PROCEDURE — 94640 AIRWAY INHALATION TREATMENT: CPT | Mod: 76

## 2025-01-17 PROCEDURE — 85014 HEMATOCRIT: CPT

## 2025-01-17 PROCEDURE — 120N000003 HC R&B IMCU UMMC

## 2025-01-17 PROCEDURE — 93321 DOPPLER ECHO F-UP/LMTD STD: CPT | Mod: 26 | Performed by: INTERNAL MEDICINE

## 2025-01-17 PROCEDURE — 250N000011 HC RX IP 250 OP 636: Performed by: PHYSICIAN ASSISTANT

## 2025-01-17 PROCEDURE — 84520 ASSAY OF UREA NITROGEN: CPT

## 2025-01-17 PROCEDURE — 250N000013 HC RX MED GY IP 250 OP 250 PS 637: Performed by: SURGERY

## 2025-01-17 PROCEDURE — 71260 CT THORAX DX C+: CPT

## 2025-01-17 PROCEDURE — 70250 X-RAY EXAM OF SKULL: CPT

## 2025-01-17 PROCEDURE — 250N000013 HC RX MED GY IP 250 OP 250 PS 637: Performed by: NURSE PRACTITIONER

## 2025-01-17 PROCEDURE — 93325 DOPPLER ECHO COLOR FLOW MAPG: CPT | Mod: 26 | Performed by: INTERNAL MEDICINE

## 2025-01-17 PROCEDURE — 93325 DOPPLER ECHO COLOR FLOW MAPG: CPT

## 2025-01-17 PROCEDURE — 71260 CT THORAX DX C+: CPT | Mod: 26 | Performed by: RADIOLOGY

## 2025-01-17 PROCEDURE — 83735 ASSAY OF MAGNESIUM: CPT | Performed by: STUDENT IN AN ORGANIZED HEALTH CARE EDUCATION/TRAINING PROGRAM

## 2025-01-17 PROCEDURE — 74177 CT ABD & PELVIS W/CONTRAST: CPT

## 2025-01-17 PROCEDURE — 93308 TTE F-UP OR LMTD: CPT | Mod: 26 | Performed by: INTERNAL MEDICINE

## 2025-01-17 PROCEDURE — 74177 CT ABD & PELVIS W/CONTRAST: CPT | Mod: 26 | Performed by: RADIOLOGY

## 2025-01-17 RX ORDER — METHOCARBAMOL 500 MG/1
500 TABLET, FILM COATED ORAL 3 TIMES DAILY PRN
Status: DISCONTINUED | OUTPATIENT
Start: 2025-01-17 | End: 2025-01-25 | Stop reason: HOSPADM

## 2025-01-17 RX ORDER — OXYCODONE HYDROCHLORIDE 5 MG/1
5-10 TABLET ORAL EVERY 6 HOURS PRN
Status: DISCONTINUED | OUTPATIENT
Start: 2025-01-17 | End: 2025-01-17

## 2025-01-17 RX ORDER — OXYCODONE HYDROCHLORIDE 5 MG/1
5 TABLET ORAL EVERY 6 HOURS PRN
Status: DISCONTINUED | OUTPATIENT
Start: 2025-01-17 | End: 2025-01-25 | Stop reason: HOSPADM

## 2025-01-17 RX ORDER — LIDOCAINE 4 G/G
2 PATCH TOPICAL
Status: DISCONTINUED | OUTPATIENT
Start: 2025-01-17 | End: 2025-01-25 | Stop reason: HOSPADM

## 2025-01-17 RX ORDER — GABAPENTIN 300 MG/1
900 CAPSULE ORAL AT BEDTIME
Status: DISCONTINUED | OUTPATIENT
Start: 2025-01-17 | End: 2025-01-25 | Stop reason: HOSPADM

## 2025-01-17 RX ORDER — IOPAMIDOL 755 MG/ML
115 INJECTION, SOLUTION INTRAVASCULAR ONCE
Status: COMPLETED | OUTPATIENT
Start: 2025-01-17 | End: 2025-01-17

## 2025-01-17 RX ORDER — SODIUM CHLORIDE FOR INHALATION 3 %
3 VIAL, NEBULIZER (ML) INHALATION
Status: DISCONTINUED | OUTPATIENT
Start: 2025-01-17 | End: 2025-01-18

## 2025-01-17 RX ORDER — SODIUM CHLORIDE FOR INHALATION 3 %
3 VIAL, NEBULIZER (ML) INHALATION 3 TIMES DAILY
Status: DISCONTINUED | OUTPATIENT
Start: 2025-01-17 | End: 2025-01-17

## 2025-01-17 RX ORDER — OXYCODONE HYDROCHLORIDE 10 MG/1
10 TABLET ORAL EVERY 6 HOURS PRN
Status: DISCONTINUED | OUTPATIENT
Start: 2025-01-17 | End: 2025-01-25 | Stop reason: HOSPADM

## 2025-01-17 RX ORDER — ALBUTEROL SULFATE 0.83 MG/ML
2.5 SOLUTION RESPIRATORY (INHALATION)
Status: DISPENSED | OUTPATIENT
Start: 2025-01-17 | End: 2025-01-20

## 2025-01-17 RX ORDER — ALBUTEROL SULFATE 0.83 MG/ML
2.5 SOLUTION RESPIRATORY (INHALATION) EVERY 4 HOURS PRN
Status: DISCONTINUED | OUTPATIENT
Start: 2025-01-17 | End: 2025-01-23

## 2025-01-17 RX ORDER — OXYCODONE HYDROCHLORIDE 5 MG/1
5 TABLET ORAL EVERY 6 HOURS PRN
Status: DISCONTINUED | OUTPATIENT
Start: 2025-01-17 | End: 2025-01-17

## 2025-01-17 RX ADMIN — CALCIUM CARBONATE (ANTACID) CHEW TAB 500 MG 1000 MG: 500 CHEW TAB at 08:31

## 2025-01-17 RX ADMIN — APIXABAN 5 MG: 5 TABLET, FILM COATED ORAL at 21:04

## 2025-01-17 RX ADMIN — CALCIUM CARBONATE (ANTACID) CHEW TAB 500 MG 1000 MG: 500 CHEW TAB at 17:01

## 2025-01-17 RX ADMIN — ASPIRIN 81 MG CHEWABLE TABLET 81 MG: 81 TABLET CHEWABLE at 08:31

## 2025-01-17 RX ADMIN — INSULIN ASPART 1 UNITS: 100 INJECTION, SOLUTION INTRAVENOUS; SUBCUTANEOUS at 04:45

## 2025-01-17 RX ADMIN — LOSARTAN POTASSIUM 25 MG: 25 TABLET, FILM COATED ORAL at 08:30

## 2025-01-17 RX ADMIN — ARIPIPRAZOLE 2 MG: 2 TABLET ORAL at 08:32

## 2025-01-17 RX ADMIN — INSULIN ASPART 1 UNITS: 100 INJECTION, SOLUTION INTRAVENOUS; SUBCUTANEOUS at 19:13

## 2025-01-17 RX ADMIN — LOPERAMIDE HYDROCHLORIDE 2 MG: 2 CAPSULE ORAL at 06:25

## 2025-01-17 RX ADMIN — ALBUTEROL SULFATE 2.5 MG: 2.5 SOLUTION RESPIRATORY (INHALATION) at 20:39

## 2025-01-17 RX ADMIN — METOPROLOL TARTRATE 50 MG: 25 TABLET, FILM COATED ORAL at 08:29

## 2025-01-17 RX ADMIN — METOPROLOL TARTRATE 50 MG: 25 TABLET, FILM COATED ORAL at 21:05

## 2025-01-17 RX ADMIN — PIPERACILLIN SODIUM AND TAZOBACTAM SODIUM 3.38 G: 3; .375 INJECTION, SOLUTION INTRAVENOUS at 10:12

## 2025-01-17 RX ADMIN — GABAPENTIN 900 MG: 300 CAPSULE ORAL at 21:04

## 2025-01-17 RX ADMIN — PIPERACILLIN SODIUM AND TAZOBACTAM SODIUM 3.38 G: 3; .375 INJECTION, SOLUTION INTRAVENOUS at 22:28

## 2025-01-17 RX ADMIN — METHOCARBAMOL 500 MG: 500 TABLET ORAL at 13:24

## 2025-01-17 RX ADMIN — OXYCODONE HYDROCHLORIDE 10 MG: 10 TABLET ORAL at 16:15

## 2025-01-17 RX ADMIN — RAMELTEON 8 MG: 8 TABLET ORAL at 21:05

## 2025-01-17 RX ADMIN — BACLOFEN 10 MG: 10 TABLET ORAL at 08:30

## 2025-01-17 RX ADMIN — ONDANSETRON 4 MG: 4 TABLET, ORALLY DISINTEGRATING ORAL at 13:29

## 2025-01-17 RX ADMIN — IOPAMIDOL 115 ML: 755 INJECTION, SOLUTION INTRAVENOUS at 16:35

## 2025-01-17 RX ADMIN — ACETAMINOPHEN 650 MG: 325 TABLET, FILM COATED ORAL at 08:28

## 2025-01-17 RX ADMIN — PIPERACILLIN SODIUM AND TAZOBACTAM SODIUM 3.38 G: 3; .375 INJECTION, SOLUTION INTRAVENOUS at 17:01

## 2025-01-17 RX ADMIN — PIPERACILLIN SODIUM AND TAZOBACTAM SODIUM 3.38 G: 3; .375 INJECTION, SOLUTION INTRAVENOUS at 04:45

## 2025-01-17 RX ADMIN — ARIPIPRAZOLE 2 MG: 2 TABLET ORAL at 21:05

## 2025-01-17 RX ADMIN — ROSUVASTATIN CALCIUM 40 MG: 20 TABLET, FILM COATED ORAL at 08:31

## 2025-01-17 RX ADMIN — THERA TABS 1 TABLET: TAB at 08:31

## 2025-01-17 RX ADMIN — Medication 1250 MG: at 13:08

## 2025-01-17 RX ADMIN — BACLOFEN 10 MG: 10 TABLET ORAL at 17:01

## 2025-01-17 RX ADMIN — HUMAN ALBUMIN MICROSPHERES AND PERFLUTREN 5 ML: 10; .22 INJECTION, SOLUTION INTRAVENOUS at 07:51

## 2025-01-17 RX ADMIN — GABAPENTIN 300 MG: 300 CAPSULE ORAL at 17:00

## 2025-01-17 RX ADMIN — INSULIN ASPART 3 UNITS: 100 INJECTION, SOLUTION INTRAVENOUS; SUBCUTANEOUS at 08:33

## 2025-01-17 RX ADMIN — SODIUM CHLORIDE SOLN NEBU 3% 3 ML: 3 NEBU SOLN at 20:39

## 2025-01-17 RX ADMIN — FUROSEMIDE 40 MG: 10 INJECTION, SOLUTION INTRAMUSCULAR; INTRAVENOUS at 10:01

## 2025-01-17 RX ADMIN — FUROSEMIDE 40 MG: 10 INJECTION, SOLUTION INTRAMUSCULAR; INTRAVENOUS at 17:07

## 2025-01-17 RX ADMIN — Medication 5 ML: at 10:03

## 2025-01-17 RX ADMIN — OXYCODONE HYDROCHLORIDE 5 MG: 5 TABLET ORAL at 08:31

## 2025-01-17 RX ADMIN — SERTRALINE HYDROCHLORIDE 200 MG: 100 TABLET ORAL at 08:28

## 2025-01-17 RX ADMIN — Medication 1250 MG: at 00:01

## 2025-01-17 RX ADMIN — APIXABAN 5 MG: 5 TABLET, FILM COATED ORAL at 08:28

## 2025-01-17 ASSESSMENT — ACTIVITIES OF DAILY LIVING (ADL)
ADLS_ACUITY_SCORE: 64
ADLS_ACUITY_SCORE: 63
ADLS_ACUITY_SCORE: 64
ADLS_ACUITY_SCORE: 63
ADLS_ACUITY_SCORE: 64
ADLS_ACUITY_SCORE: 64
ADLS_ACUITY_SCORE: 63
ADLS_ACUITY_SCORE: 63
ADLS_ACUITY_SCORE: 64
ADLS_ACUITY_SCORE: 64
ADLS_ACUITY_SCORE: 63
ADLS_ACUITY_SCORE: 64
ADLS_ACUITY_SCORE: 64
ADLS_ACUITY_SCORE: 63

## 2025-01-17 NOTE — PROVIDER NOTIFICATION
Patient found after cutting NJ tube and bridle into pieces, able to retrieve rest of tube from nares and removed rest of tube safely. CVTS provider Katt Sprague notified. 1:1 sitter restarted. Xray of skull obtained.

## 2025-01-17 NOTE — PROGRESS NOTES
"Brief Surgery Crossover Note    Paged by RN re: abdominal pain    Assessed pt at bedside. Resting comfortably in bed. Endorses abdominal pain, mostly in hypogastrium. Tolerating Tube Feeds at goal, no nausea/vomiting. Passing gas and having frequent, loose bowel movements. Voiding via purewick.     /68 (BP Location: Right arm)   Pulse 88   Temp 97.5  F (36.4  C) (Oral)   Resp 19   Ht 1.803 m (5' 11\")   Wt 85.2 kg (187 lb 13.3 oz)   SpO2 99%   BMI 26.20 kg/m      General: alert and oriented, in no acute distress  CV: regular rate and rhythm, palpable peripheral pulses, no edema   Resp: no increased work of breathing   GI: Mostly soft and non-distended. Apparent bulging mass in mid/left hypogastrium. Hard, Non-pulsatile. Mildly tender to palpation.  Extremities: no significant pitting edema     - No acute concerns at this moment.  - Continue multimodal pain control  - Please reach out with questions or concerns    Sean Campbell MD  General Surgery PGY-1  Pager: [phone]      **For on call schedules and contact information, please refer to [facility]**      "

## 2025-01-17 NOTE — PROGRESS NOTES
CVTS Progress Note  01/17/2025         Assessment and Plan:     Erwin Aguilar is a 64 year old male with a past medical history of DM2, HTN, PVD, and neuropathic pain who initially presented on 12/31/24 to the ED with arm pain, vomiting, and diarrhea; ACS and ST depression. He subsequently had VT arrest with 1 round of CPR, epinephrine, defibrillation. After ROSC was found to be in afib with RVR. Became hypotensive and was subsequently cannulated for VA ECMO. Found to have multivessel disease s/p CABG x4 on 1/2/25 with Dr. Rosenbaum. ECMO decannulated on 1/3/25 at bedside. ICU course complicated by large R PTX seen on chest CT on 1/4/25 s/p bedside CT placement, VAP, and encephalopathy. Extubated 1/11/25.       Cardiovascular:   Hx of CAD - s/p CABG x4 with Dr. Rosenbaum   Mixed shock - cardiogenic, vasoplegic, resolved   HTN  Pre-op VT Cardiac Arrest on VA ECMO (1/1 - 1/3)  HLD  Prolonged Qtc  Hx PAF on DOAC PTA - s/p MAZE & LAAC  No arrhythmias overnight, HD stable, in NSR.   Post operative ECHO 1/5 with LVEF 55-60%, normal RV function, no pericardial effusion   1/17 Repeat echo in setting of therapeutic AC showed LVEF 60-65%, small, loculated pericardial effusion without HD compromise  -  mg daily  - Rosuvastatin 40 mg daily   - Metoprolol tartrate to 50 mg BID   - Losartan 25mg daily  - Diuresis as below  - AC as below  - 1/17 QTc: 492 (525)    Chest tubes: Removed in ICU   TPW: Removed in ICU     Pulmonary:  Acute Hypoxic Respiratory failure, resolved   Respiratory alkalosis 2:2 agitation - resolved   R tension pneumothorax, resolved   VAP  SubQ emphysema, improving  Right hemidiaphragm elevation  Bilateral pleural effusions, small & asymptomatic  Extubated POD 9. Now saturating well on RA.  - Supplemental O2 PRN to keep sats > 92%  - Pulm hygiene, IS, OOB/activity and deep breathing  - ABX detailed below   - PRN Mucomyst and Duoneb  - QID 3% saline nebs along with Volera for secretions/atelectasis  - Scheduled  and prn albuterol nebs (PTA albuterol inhaler PRN)    Neurology:  Acute post-operative pain, improved  Encephalopathy  Delirium   Anxiety   Chronic pain with opioid dependence  Neuropathy, PTA  Insomnia   Multimodal analgesia regimen:  - Acetaminophen PRN  - Lidocaine patches  - Modest dose Robaxin PRN in setting of improving delirium  - PRN hydroxyzine discontinued in s/o delirium   - PO oxycodone PRN frequency reduced (PTA 10mg TID PRN)  - PTA baclofen   - 1/1 vEEG without seizure activity (severe encephalopathy) and 12/31, 1/4, and 1/7 CTH negative for acute findings.   - PTA Sertraline  - Psych consulted for encephalopathy/delirium recs in s/o prolonged QTc; appreciate assistance  - Melatonin discontinued in favor of HS ramelteon per Psych recs  - PTA gabapentin resumed at reduced dose, look to titrate to PTA dosing in the next couple of days - HS dose increased to 900mg PTA dose 1/17  - Abilify BID for a time until encephalopathy improves before tapering to discontinuation  - Delirium precautions; sitter discontinued 1/16     / Renal / Fluid / Electrolytes:  Hypernatremia, resolved   AUSTIN/ATN, resolved  Hypervolemia, improving  Urinary retention, resolved  Hypokalemia, resolved  BL creat ~ 0.6-0.8, creatinine at baseline; adequate UOP with multiple unmeasured voids.   - Diuresis: 40 mg IV Lasix BID  - 60 meq of potassium BID x5 days  - 200 mg mag ox daily x5 days  - Replete lytes per protocol  - Strict I/O, daily weights  - Avoid/limit nephrotoxins as able  - Lymphedema consult for consideration of compression stocking to LLE    GI / Nutrition:   Dysphagia   Severe malnutrition in the context of acute on chronic illness   Liquid stools   - SLP following; appreciate assistance with diet advancement  Orders Placed This Encounter      Combination Diet Moderate Consistent Carb (60 g CHO per Meal) Diet; Soft and Bite Sized Diet (level 6); Thin Liquids (level 0); Low Saturated Fat Na <2400mg Diet  - Cycled TF  - Lalito  counts 1/17-19  - Dietitian consulted and following, appreciate assistance  - PRN bowel regimen  - PRN Imodium 2 mg QID PRN     Endocrine:  Stress-induced hyperglycemia, resolved  Type 2 diabetes mellitus   Hgb A1c 5.7  - Initially managed on insulin drip postop, transitioned to sliding scale & Lantus  - Goal BG <180 for optimal healing  - Lantus (PTA med) 15 units BID    Infectious Disease:  Stress-induced leukocytosis, resolved  Sternal wound infection, improving  Aspiration PNA: Klebsiella pneumoniae   WBC stable, afebrile  - Completed perioperative antibiotics  - Continue to monitor fever curve, CBC  - 1/10 C. Diff neg  - 1/14 infectious work-up for worsening leukocytosis negative thus far; follow for final results   - 1/14 BLE US neg for DVT  - 1/15 2-view CXR with low lung volumes, small bilateral pleural effusions but too small for drainage  - Defer respiratory viral panel given stable on RA, afebrile  - Klebsiella pneumoniae from sputum cultures on 12/31 and 1/9  - Zosyn and vancomycin were restarted 1/9 for fever. Sputum culture again grew Klebsiella pneumoniae.   - 1/11 noted to have small puss drainage from sternotomy    - Continue Vanc/Zosyn per Dr. Rosenbaum, plan for 10-day course (end 1/18).    - CT chest on 1/12/25, not concerning for deep infection  - CT CAP today to evaluate abd pain, continued unexplained leukocytosis    Hematology:   Acute blood loss anemia  Thrombocytopenia, resolved  Thrombocytosis, likely reactive  Therapeutic AC for PAF  Hgb stable; Plt 510 (569), no signs or symptoms of active bleeding  - CBC daily    Anticoagulation:   - ASA 81 mg   - PTA apixaban resumed for hx of PAF now s/p MAZE and LAAC; defer long-term AC plan to OP Cardiology    MSK/Skin:  Sternotomy  Surgical incisions  Hx RLE BKA  - Sternal precautions  - Incisional cares per protocol  - Will remove sutures from right groin today    Prophylaxis:   - Stress ulcer prophylaxis: Pantoprazole 40 mg daily for 30 days  - DVT  "prophylaxis: therapeutic AC, OOB/ambulation, SCD    Disposition:   - Transferred to  1/12  - Therapies recommending discharge to TCU - anticipate medical readiness tomorrow at conclusion of IV antibiotics    Dr. Rosenbaum updated.    Katt Sprague, CNP, [facility]-  Cardiothoracic Surgery  [employer] Pager [phone]          Interval History:     Seen by surgery resident overnight for c/o abd pain which he states kept him from sleeping - asks for a muscle relaxer for this.  Additionally states that he has chronic back pain for which he was taking opioids PTA (60mg at a time, by his report) and this is limiting his tolerance of being up in the chair.  Expressing desire for feeding tube to be removed; states he will refuse further TF as he blames these for his ongoing diarrhea.  Reports being willing to eat but defers ordering food.    Dismissive at times, still forgetful/mildly confused but improved overall.  Denies SOB.  Denies sternal popping/clicking.         Physical Exam:     Gen: NAD, resting in bed, calm, cooperative on exam, conversant  Neuro: alert & grossly oriented, no focal deficits, face symmetric, speech clear, OGLESBY  CV: RRR on monitor - SR  Pulm: unlabored breathing on RA, occasional productive cough  Abd: SND, firm & tender to palpation in the central pelvis and LLQ, subcutaneous hematoma in LLQ, otherwise soft without guarding or peritoneal signs  Ext: moderate pitting peripheral edema in BLE L>R, well healed RLE BKA stump, R groin incision C/D/I with sutures in place - subtle erythema centrally  Incision: clean, dry, intact, no erythema or induration, sternum stable  Tubes/drain sites: CYRIL, scabbed over, no erythema or induration         Data:    /79   Pulse 81   Temp 97.6  F (36.4  C) (Oral)   Resp 18   Ht 1.803 m (5' 11\")   Wt 87.5 kg (192 lb 14.4 oz)   SpO2 100%   BMI 26.90 kg/m      Vitals:    01/13/25 0545 01/16/25 1300 01/17/25 0800   Weight: 89.1 kg (196 lb 6.9 oz) 85.2 kg (187 lb " 13.3 oz) 87.5 kg (192 lb 14.4 oz)     Admission wt:    76 kg  24-hr net fluid balance: -270 mL with multiple unmeasured voids      Intake/Output Summary (Last 24 hours) at 2025 1057  Last data filed at 2025 0100  Gross per 24 hour   Intake 840 ml   Output 1050 ml   Net -210 ml       Imaging:  Recent imaging reviewed    Recent Results (from the past 24 hours)   XR Chest 2 Views    Narrative    Exam: XR CHEST 2 VIEWS, 2025 1:24 PM    Comparison: 2025    History: s/p cardiac surgery, leukocytosis    Findings:  PA and lateral views of the chest. Median sternotomy wires.  Subcutaneous emphysema in the right lower neck and right chest wall.  Cardiomegaly. Decreased lung volumes.  Perihilar and bibasilar  opacities. Elevated right hemidiaphragm. Retrocardiac opacity.  Probable small bilateral pleural effusions.      Impression    Impression:   1. Subcutaneous emphysema in the right chest wall and right lower  neck, decreased compared to 2025.  2. There is an appearance of decreased lung volumes and increased  bibasilar atelectasis, although this may be exaggerated due to  difference in positioning. Elevated right hemidiaphragm.  3. Probable small bilateral pleural effusions.    I have personally reviewed the examination and initial interpretation  and I agree with the findings.    JAISON BRAGA MD         SYSTEM ID:  [id number]   Echocardiogram Limited   Result Value    LVEF  60-65%    Narrative    [id number]  [id number]  [id number]  [id number]^DAWSON^JOYCE^ARLIN     [facility],[place]  Echocardiography Laboratory  [address] [place] [id number]     Name: SUSAN CHENG  MRN: [id number]  : 1960  Study Date: 2025 07:44 AM  Age: 64 yrs  Gender: Male  [place] [facility] [place] For Study: Pericardial Effusion  Ordering Physician: JOYCE OSMAN  Performed By: Ronda Delacruz     BSA: 2.1 m2  Height: 71 in  Weight: 196 lb  BP: 130/79  mmHg  ______________________________________________________________________________  Procedure  Limited Echocardiogram with two-dimensional, color and spectral Doppler.  Contrast Optison. Optison (NDC #3695-0367-99) given intravenously. Patient was  given 5 ml mixture of 3 ml Optison and 6 ml saline. 4 ml wasted.  ______________________________________________________________________________  Interpretation Summary  Small loculated pericardial effusion along RV free wall without hemodynamic  compromise.  ______________________________________________________________________________  Left Ventricle  Global and regional left ventricular function is normal with an EF of 60-65%.     Right Ventricle  Right ventricular function, chamber size, wall motion, and thickness are  normal.     Vessels  IVC diameter and respiratory changes fall into an intermediate range  suggesting an RA pressure of 8 mmHg.     Pericardium  Small loculated pericardial effusion along RV free wall without hemodynamic  compromise.     Miscellaneous  A bilateral pleural effusion is present.  ______________________________________________________________________________  MMode/2D Measurements & Calculations  IVSd: 1.1 cm  LVIDd: 4.0 cm  LVIDs: 2.6 cm  LVPWd: 1.5 cm  FS: 34.7 %  LV mass(C)d: 180.6 grams  LV mass(C)dI: 86.4 grams/m2  RWT: 0.76     ______________________________________________________________________________  Report approved by: FADUMO Butt MD on 01/17/2025 08:16 AM                  Labs:  Recent Labs   Lab 01/17/25  0826 01/17/25  0457 01/17/25  0444 01/16/25  0903 01/16/25  0732 01/16/25  0434 01/15/25  0738 01/15/25  0555   WBC  --  16.2*  --   --   --  16.8*  --  13.6*   HGB  --  8.8*  --   --   --  9.5*  --  9.1*   MCV  --  97  --   --   --  92  --  95   PLT  --  510*  --   --   --  569*  --  458*   NA  --  138  --   --  139  --   --  140   POTASSIUM  --  4.1  --   --  4.1  --   --  3.5   CHLORIDE  --  107  --   --  108*  --   --   108*   CO2  --  22  --   --  21*  --   --  22   BUN  --  28.4*  --   --  27.3*  --   --  20.0   CR  --  0.66*  --   --  0.58*  --   --  0.50*   ANIONGAP  --  9  --   --  10  --   --  10   AFRICA  --  8.3*  --   --  8.6*  --   --  8.4*   * 168* 157*   < > 230*  --    < > 212*    < > = values in this interval not displayed.      GLUCOSE:   Recent Labs   Lab 01/17/25  0826 01/17/25  0457 01/17/25  0444 01/16/25  2316 01/16/25  2017 01/16/25  1602   * 168* 157* 180* 137* 129*

## 2025-01-17 NOTE — PROGRESS NOTES
BRIEF CVTS PROGRESS NOTE    S:  Notified by RN that upon entering the room, she discovered that pt had cut his SBFT and bridle and only a portion of the tube was accounted for.  Pt stated that he was told by a nurse that the tube was for oxygen and that he was annoyed with it and felt he no longer needed it although in an interaction with this provider earlier in the day, he motioned to the tube and stated that he would not allow anyone to given any more tube feedings through it because he felt that it was giving him diarrhea.    O:  Cut end of tube was able to be visualized just beyond the nasal os and grasped by RN; it was removed and appeared intact.      A/P:  Will pursue confirmatory imaging to verify no retained foreign object present.  Place back on sitter for medical healing.  Discussed with patient with SO also in attendance.  Bedside RN aware.    Katt Sprague CNP, ACNPC-AG  Cardiothoracic Surgery  Pager [phone]

## 2025-01-17 NOTE — PLAN OF CARE
Shift [id number]   ?  A/I : Monitored vitals and assessed pt status. A0x4, forgetful and irritable. Vitals stable on RA. Normal sinus rhythm. Afebrile. Incontinent of bowel and bladder. No BM this shift. Patient has no appetite, been nauseous off/on and having abdominal pain, patient cut NJ himself while significant other visiting and told signficant other not to tell his nurse what he was doing, thought it was an oxygen tube when writer asked why he had cut the NJ, patient had been making remarks earlier in shift about wanting to remove the tube because it is causing him diarrhea, reeducated on importance of nutrition after surgery. Denies chest pain, palpitations, shortness of breath, dizziness. CVTS removed sutures from right groin, packed with nuguaze, ordered for BID dressing changes, left ankle graft site with large amount of serosangenious drainage, redressed with gauze and ABD. Lift assist. IV access: L PIV and PICC.     Tests/Procedures: CT chest/abdomen/pelvis and skull xray to see if any pieces of NJ tube remain  PRN: tylenol, oxycodone, zofran  ?  P: Continue plan of care.

## 2025-01-17 NOTE — PROGRESS NOTES
CLINICAL NUTRITION SERVICES - BRIEF NOTE    Attempted room visit to assess/encourage oral intake and review Heart Healthy Guidelines. Pt declined to speak with RD at this time stating he is busy with other disciplines. Handouts left at bedside. Will re-attempt visit as able    INTERVENTIONS  Recommendations / Nutrition Prescription  Vital 1.5 Lalito @ goal of 60ml/hr x18 hours + 1 pkt Prosource TF20 (1080ml/day) will provide: 61350 kcals, 92 g PRO, 825 ml free H20, 201 g CHO, and 6 g fiber daily. Meets ~ 75% of estimated nutrition needs   Kcal counts 1/17-1/19  Encourage PO efforts  Ensure Enlive chocolate BID at 10 am and 2 pm + supplements PRN     - recommend prioritizing 1-2 diet restrictions to promote PO efforts        Linda Harley MS, RD, LD, [facility]    6C (beds 3303-6356) + 7C (beds 2899-9580) + ED + Obs  Available in Vocera by name or unit dietitian

## 2025-01-17 NOTE — PROGRESS NOTES
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 He subsequently had VT arrest with 1 round of CPR, epinephrine, defibrillation. After ROSC was found to be in afib with RVR. Became hypotensive and was subsequently cannulated for VA ECMO. Found to have multivessel disease s/p CABG x4 on 1/2/25 with Dr. Rosenbaum. ECMO decannulated on 1/3/25 at bedside. ICU course complicated by large R PTX seen on chest CT on 1/4/25 s/p bedside CT placement, VAP, and encephalopathy. Extubated 1/11/25. \"   Existing Precautions/Restrictions sternal   Cognition   Affect/Mental Status (Cognition) confused   Pain Assessment   Patient Currently in Pain No   Integumentary/Edema   Onset of Edema 01/02/25  (date of CABG)   Affected Body Part(s) Left LE;Right LE   Edema Etiology Surgery   Etiology Comments harvest site from CABG, immobility, hypervolumia   Edema Precautions Cardiac Edema/CHF;Renal Insufficiency   General Comments/Previous Edema Treatment/Edema Equipment pt reports \"i've always used compression\" but unable to elaborate on type/reason/frequency   Edema Examination/Assessment   Skin Condition Pitting;Dryness   Skin Integrity intact, weeping from distance graft site   Pitting Assessment 1+ firm LLE, trace R LE   Edema Assessment Comments significant weeping noted from distal surgical site (wound size of a " ball point pen tip)   Clinical Impression   Criteria for Skilled Therapeutic Intervention Yes, treatment indicated   Edema: Patient Presentation Edema   Functional limitations due to impairments ind with functional mobility, ROM, ADL completion   Clinical Presentation (PT Evaluation Complexity) evolving   Clinical Presentation Rationale comorbidities, clinical judgement   Edema: Planned Interventions Manual lymph drainage;Gradient compression bandaging;Fit for compression garment;Edema exercises;Precautions to prevent infection/exacerbation;Education;Manual therapy   Risk & Benefits of therapy have been explained evaluation/treatment results reviewed;care plan/treatment goals reviewed;risks/benefits reviewed;current/potential barriers reviewed;participants voiced agreement with care plan;participants included;patient   Physical Therapy Goals   PT Frequency 4x/week   PT Predicted Duration/Target Date for Goal Attainment 01/24/25   PT Goals Edema   PT: Edema education to increase ability to manage edema after discharge from the hospital Patient;Caregiver;Demonstrate;[facility];Skin care routine;signs/symptoms of intolerance;wear schedule;limb positioning;garment/bandage care;discharge recommendations   PT: Management of edema bandages Patient;Caregiver;Demonstrate;[facility];quick wrap;garment(s)   PT: Functional edema exercise program to reduce limb volume, increase activity tolerance and improve independence with ADL Patient;Caregiver;Demonstrates;[facility];HEP;walking program

## 2025-01-18 LAB
ANION GAP SERPL CALCULATED.3IONS-SCNC: 10 MMOL/L (ref 7–15)
BACTERIA WND CULT: NORMAL
BUN SERPL-MCNC: 22 MG/DL (ref 8–23)
CALCIUM SERPL-MCNC: 8.7 MG/DL (ref 8.8–10.4)
CHLORIDE SERPL-SCNC: 106 MMOL/L (ref 98–107)
CREAT SERPL-MCNC: 0.66 MG/DL (ref 0.67–1.17)
CRP SERPL-MCNC: 17.6 MG/L
EGFRCR SERPLBLD CKD-EPI 2021: >90 ML/MIN/1.73M2
ERYTHROCYTE [DISTWIDTH] IN BLOOD BY AUTOMATED COUNT: 17.6 % (ref 10–15)
GLUCOSE BLDC GLUCOMTR-MCNC: 105 MG/DL (ref 70–99)
GLUCOSE BLDC GLUCOMTR-MCNC: 107 MG/DL (ref 70–99)
GLUCOSE BLDC GLUCOMTR-MCNC: 125 MG/DL (ref 70–99)
GLUCOSE BLDC GLUCOMTR-MCNC: 138 MG/DL (ref 70–99)
GLUCOSE BLDC GLUCOMTR-MCNC: 84 MG/DL (ref 70–99)
GLUCOSE BLDC GLUCOMTR-MCNC: 98 MG/DL (ref 70–99)
GLUCOSE SERPL-MCNC: 111 MG/DL (ref 70–99)
HCO3 SERPL-SCNC: 24 MMOL/L (ref 22–29)
HCT VFR BLD AUTO: 29.1 % (ref 40–53)
HGB BLD-MCNC: 8.9 G/DL (ref 13.3–17.7)
MAGNESIUM SERPL-MCNC: 2.3 MG/DL (ref 1.7–2.3)
MCH RBC QN AUTO: 29.8 PG (ref 26.5–33)
MCHC RBC AUTO-ENTMCNC: 30.6 G/DL (ref 31.5–36.5)
MCV RBC AUTO: 97 FL (ref 78–100)
PHOSPHATE SERPL-MCNC: 4.1 MG/DL (ref 2.5–4.5)
PLATELET # BLD AUTO: 470 10E3/UL (ref 150–450)
POTASSIUM SERPL-SCNC: 3.1 MMOL/L (ref 3.4–5.3)
POTASSIUM SERPL-SCNC: 3.3 MMOL/L (ref 3.4–5.3)
POTASSIUM SERPL-SCNC: 3.4 MMOL/L (ref 3.4–5.3)
RBC # BLD AUTO: 2.99 10E6/UL (ref 4.4–5.9)
SODIUM SERPL-SCNC: 140 MMOL/L (ref 135–145)
WBC # BLD AUTO: 12.3 10E3/UL (ref 4–11)

## 2025-01-18 PROCEDURE — 250N000013 HC RX MED GY IP 250 OP 250 PS 637: Performed by: STUDENT IN AN ORGANIZED HEALTH CARE EDUCATION/TRAINING PROGRAM

## 2025-01-18 PROCEDURE — 84100 ASSAY OF PHOSPHORUS: CPT | Performed by: SURGERY

## 2025-01-18 PROCEDURE — 999N000157 HC STATISTIC RCP TIME EA 10 MIN

## 2025-01-18 PROCEDURE — 250N000011 HC RX IP 250 OP 636: Performed by: PHYSICIAN ASSISTANT

## 2025-01-18 PROCEDURE — 94640 AIRWAY INHALATION TREATMENT: CPT | Mod: 76

## 2025-01-18 PROCEDURE — 250N000013 HC RX MED GY IP 250 OP 250 PS 637

## 2025-01-18 PROCEDURE — 120N000003 HC R&B IMCU UMMC

## 2025-01-18 PROCEDURE — 250N000013 HC RX MED GY IP 250 OP 250 PS 637: Performed by: SURGERY

## 2025-01-18 PROCEDURE — 84132 ASSAY OF SERUM POTASSIUM: CPT | Performed by: STUDENT IN AN ORGANIZED HEALTH CARE EDUCATION/TRAINING PROGRAM

## 2025-01-18 PROCEDURE — 84132 ASSAY OF SERUM POTASSIUM: CPT | Performed by: SURGERY

## 2025-01-18 PROCEDURE — 250N000009 HC RX 250: Performed by: SURGERY

## 2025-01-18 PROCEDURE — 86140 C-REACTIVE PROTEIN: CPT | Performed by: NURSE PRACTITIONER

## 2025-01-18 PROCEDURE — 85014 HEMATOCRIT: CPT

## 2025-01-18 PROCEDURE — 94668 MNPJ CHEST WALL SBSQ: CPT

## 2025-01-18 PROCEDURE — 83735 ASSAY OF MAGNESIUM: CPT | Performed by: STUDENT IN AN ORGANIZED HEALTH CARE EDUCATION/TRAINING PROGRAM

## 2025-01-18 PROCEDURE — 80048 BASIC METABOLIC PNL TOTAL CA: CPT

## 2025-01-18 PROCEDURE — 250N000011 HC RX IP 250 OP 636: Performed by: STUDENT IN AN ORGANIZED HEALTH CARE EDUCATION/TRAINING PROGRAM

## 2025-01-18 PROCEDURE — 250N000011 HC RX IP 250 OP 636: Performed by: NURSE PRACTITIONER

## 2025-01-18 PROCEDURE — 250N000013 HC RX MED GY IP 250 OP 250 PS 637: Performed by: NURSE PRACTITIONER

## 2025-01-18 PROCEDURE — 85041 AUTOMATED RBC COUNT: CPT

## 2025-01-18 PROCEDURE — 250N000009 HC RX 250

## 2025-01-18 RX ORDER — POTASSIUM CHLORIDE 750 MG/1
40 TABLET, EXTENDED RELEASE ORAL ONCE
Status: COMPLETED | OUTPATIENT
Start: 2025-01-18 | End: 2025-01-18

## 2025-01-18 RX ORDER — ARIPIPRAZOLE 2 MG/1
2 TABLET ORAL DAILY
Status: COMPLETED | OUTPATIENT
Start: 2025-01-19 | End: 2025-01-21

## 2025-01-18 RX ORDER — SODIUM CHLORIDE FOR INHALATION 3 %
3 VIAL, NEBULIZER (ML) INHALATION 3 TIMES DAILY
Status: DISCONTINUED | OUTPATIENT
Start: 2025-01-18 | End: 2025-01-23

## 2025-01-18 RX ORDER — TAMSULOSIN HYDROCHLORIDE 0.4 MG/1
0.4 CAPSULE ORAL DAILY
Status: DISCONTINUED | OUTPATIENT
Start: 2025-01-18 | End: 2025-01-25 | Stop reason: HOSPADM

## 2025-01-18 RX ORDER — POTASSIUM CHLORIDE 750 MG/1
40 TABLET, EXTENDED RELEASE ORAL ONCE
Status: DISCONTINUED | OUTPATIENT
Start: 2025-01-18 | End: 2025-01-18

## 2025-01-18 RX ORDER — NICOTINE POLACRILEX 4 MG
15-30 LOZENGE BUCCAL
Status: DISCONTINUED | OUTPATIENT
Start: 2025-01-18 | End: 2025-01-25 | Stop reason: HOSPADM

## 2025-01-18 RX ORDER — DEXTROSE MONOHYDRATE 25 G/50ML
25-50 INJECTION, SOLUTION INTRAVENOUS
Status: DISCONTINUED | OUTPATIENT
Start: 2025-01-18 | End: 2025-01-25 | Stop reason: HOSPADM

## 2025-01-18 RX ORDER — BUMETANIDE 2 MG/1
2 TABLET ORAL
Status: DISCONTINUED | OUTPATIENT
Start: 2025-01-18 | End: 2025-01-19

## 2025-01-18 RX ADMIN — METOPROLOL TARTRATE 50 MG: 25 TABLET, FILM COATED ORAL at 07:50

## 2025-01-18 RX ADMIN — ONDANSETRON 4 MG: 4 TABLET, ORALLY DISINTEGRATING ORAL at 07:48

## 2025-01-18 RX ADMIN — Medication 1250 MG: at 00:01

## 2025-01-18 RX ADMIN — POTASSIUM CHLORIDE 40 MEQ: 750 TABLET, EXTENDED RELEASE ORAL at 18:44

## 2025-01-18 RX ADMIN — Medication 1250 MG: at 12:12

## 2025-01-18 RX ADMIN — POTASSIUM CHLORIDE 40 MEQ: 750 TABLET, EXTENDED RELEASE ORAL at 07:48

## 2025-01-18 RX ADMIN — SODIUM CHLORIDE SOLN NEBU 3% 3 ML: 3 NEBU SOLN at 08:28

## 2025-01-18 RX ADMIN — PIPERACILLIN SODIUM AND TAZOBACTAM SODIUM 3.38 G: 3; .375 INJECTION, SOLUTION INTRAVENOUS at 05:12

## 2025-01-18 RX ADMIN — CALCIUM CARBONATE (ANTACID) CHEW TAB 500 MG 1000 MG: 500 CHEW TAB at 16:13

## 2025-01-18 RX ADMIN — BACLOFEN 10 MG: 10 TABLET ORAL at 00:03

## 2025-01-18 RX ADMIN — BUMETANIDE 2 MG: 2 TABLET ORAL at 16:13

## 2025-01-18 RX ADMIN — ARIPIPRAZOLE 2 MG: 2 TABLET ORAL at 07:48

## 2025-01-18 RX ADMIN — BACLOFEN 10 MG: 10 TABLET ORAL at 07:50

## 2025-01-18 RX ADMIN — PIPERACILLIN SODIUM AND TAZOBACTAM SODIUM 3.38 G: 3; .375 INJECTION, SOLUTION INTRAVENOUS at 21:32

## 2025-01-18 RX ADMIN — METOPROLOL TARTRATE 50 MG: 25 TABLET, FILM COATED ORAL at 21:27

## 2025-01-18 RX ADMIN — LOSARTAN POTASSIUM 25 MG: 25 TABLET, FILM COATED ORAL at 07:50

## 2025-01-18 RX ADMIN — BUMETANIDE 2 MG: 2 TABLET ORAL at 09:13

## 2025-01-18 RX ADMIN — LIDOCAINE 4% 2 PATCH: 40 PATCH TOPICAL at 21:30

## 2025-01-18 RX ADMIN — APIXABAN 5 MG: 5 TABLET, FILM COATED ORAL at 21:27

## 2025-01-18 RX ADMIN — CALCIUM CARBONATE (ANTACID) CHEW TAB 500 MG 1000 MG: 500 CHEW TAB at 07:48

## 2025-01-18 RX ADMIN — PIPERACILLIN SODIUM AND TAZOBACTAM SODIUM 3.38 G: 3; .375 INJECTION, SOLUTION INTRAVENOUS at 16:14

## 2025-01-18 RX ADMIN — GABAPENTIN 900 MG: 300 CAPSULE ORAL at 21:27

## 2025-01-18 RX ADMIN — LOPERAMIDE HYDROCHLORIDE 2 MG: 2 CAPSULE ORAL at 14:34

## 2025-01-18 RX ADMIN — SODIUM CHLORIDE SOLN NEBU 3% 3 ML: 3 NEBU SOLN at 21:05

## 2025-01-18 RX ADMIN — GABAPENTIN 300 MG: 300 CAPSULE ORAL at 16:13

## 2025-01-18 RX ADMIN — ONDANSETRON 4 MG: 4 TABLET, ORALLY DISINTEGRATING ORAL at 18:44

## 2025-01-18 RX ADMIN — ALBUTEROL SULFATE 2.5 MG: 2.5 SOLUTION RESPIRATORY (INHALATION) at 08:28

## 2025-01-18 RX ADMIN — APIXABAN 5 MG: 5 TABLET, FILM COATED ORAL at 07:49

## 2025-01-18 RX ADMIN — THERA TABS 1 TABLET: TAB at 07:50

## 2025-01-18 RX ADMIN — SODIUM CHLORIDE SOLN NEBU 3% 3 ML: 3 NEBU SOLN at 13:42

## 2025-01-18 RX ADMIN — ROSUVASTATIN CALCIUM 40 MG: 20 TABLET, FILM COATED ORAL at 07:49

## 2025-01-18 RX ADMIN — ALBUTEROL SULFATE 2.5 MG: 2.5 SOLUTION RESPIRATORY (INHALATION) at 21:05

## 2025-01-18 RX ADMIN — ALBUTEROL SULFATE 2.5 MG: 2.5 SOLUTION RESPIRATORY (INHALATION) at 13:42

## 2025-01-18 RX ADMIN — OXYCODONE HYDROCHLORIDE 5 MG: 5 TABLET ORAL at 18:44

## 2025-01-18 RX ADMIN — RAMELTEON 8 MG: 8 TABLET ORAL at 21:28

## 2025-01-18 RX ADMIN — ASPIRIN 81 MG CHEWABLE TABLET 81 MG: 81 TABLET CHEWABLE at 07:48

## 2025-01-18 RX ADMIN — SERTRALINE HYDROCHLORIDE 200 MG: 100 TABLET ORAL at 07:48

## 2025-01-18 RX ADMIN — TAMSULOSIN HYDROCHLORIDE 0.4 MG: 0.4 CAPSULE ORAL at 09:13

## 2025-01-18 RX ADMIN — OXYCODONE HYDROCHLORIDE 10 MG: 10 TABLET ORAL at 10:01

## 2025-01-18 RX ADMIN — PIPERACILLIN SODIUM AND TAZOBACTAM SODIUM 3.38 G: 3; .375 INJECTION, SOLUTION INTRAVENOUS at 10:01

## 2025-01-18 RX ADMIN — BACLOFEN 10 MG: 10 TABLET ORAL at 16:13

## 2025-01-18 ASSESSMENT — ACTIVITIES OF DAILY LIVING (ADL)
ADLS_ACUITY_SCORE: 63
ADLS_ACUITY_SCORE: 66
ADLS_ACUITY_SCORE: 66
ADLS_ACUITY_SCORE: 63
ADLS_ACUITY_SCORE: 66
ADLS_ACUITY_SCORE: 63
ADLS_ACUITY_SCORE: 66
ADLS_ACUITY_SCORE: 63
ADLS_ACUITY_SCORE: 63
ADLS_ACUITY_SCORE: 66
ADLS_ACUITY_SCORE: 63

## 2025-01-18 NOTE — PROGRESS NOTES
CVTS Progress Note  01/18/2025         Assessment and Plan:     Erwin Aguilar is a 64 year old male with a past medical history of DM2, HTN, PVD, and neuropathic pain who initially presented on 12/31/24 to the ED with arm pain, vomiting, and diarrhea; ACS and ST depression. He subsequently had VT arrest with 1 round of CPR, epinephrine, defibrillation. After ROSC was found to be in afib with RVR. Became hypotensive and was subsequently cannulated for VA ECMO. Found to have multivessel disease s/p CABG x4 on 1/2/25 with Dr. Rosenbaum. ECMO decannulated on 1/3/25 at bedside. ICU course complicated by large R PTX seen on chest CT on 1/4/25 s/p bedside CT placement, VAP, and encephalopathy. Extubated 1/11/25.       Cardiovascular:   Hx of CAD - s/p CABG x4 with Dr. Rosenbaum   Mixed shock - cardiogenic, vasoplegic, resolved   HTN  Pre-op VT Cardiac Arrest on VA ECMO (1/1 - 1/3)  HLD  Prolonged Qtc, improved, QTc now 492   Hx PAF on DOAC PTA - s/p MAZE & LAAC  No arrhythmias overnight, HD stable, in NSR.   Post operative ECHO 1/5 with LVEF 55-60%, normal RV function, no pericardial effusion   1/17 Repeat echo in setting of therapeutic AC showed LVEF 60-65%, small, loculated pericardial effusion without HD compromise  -  mg daily  - Rosuvastatin 40 mg daily   - Metoprolol tartrate to 50 mg BID   - Losartan 25mg daily  - Diuresis as below  - AC as below    Chest tubes: Removed in ICU   TPW: Removed in ICU     Pulmonary:  Acute Hypoxic Respiratory failure, resolved   Respiratory alkalosis 2:2 agitation - resolved   R tension pneumothorax, resolved   VAP  SubQ emphysema, improving  Right hemidiaphragm elevation  Bilateral pleural effusions, small & asymptomatic  Extubated POD 9. Now saturating well on RA.  - Supplemental O2 PRN to keep sats > 92%  - Pulm hygiene, IS, OOB/activity and deep breathing  - ABX detailed below   - PRN Mucomyst and Duoneb  - QID 3% saline nebs + Albuterol, strong cough on exam and mobilizing  secretions   - Scheduled and prn albuterol nebs (PTA albuterol inhaler PRN)    Neurology:  Acute post-operative pain, improved  Encephalopathy  Delirium   Anxiety   Chronic pain with opioid dependence  Neuropathy, PTA  Insomnia   Multimodal analgesia regimen:  - Acetaminophen PRN  - Lidocaine patches  - Modest dose Robaxin PRN in setting of improving delirium  - PRN hydroxyzine discontinued in s/o delirium   - PO oxycodone PRN frequency reduced (PTA 10mg TID PRN)  - PTA baclofen   - 1/1 vEEG without seizure activity (severe encephalopathy) and 12/31, 1/4, and 1/7 CTH negative for acute findings.   - PTA Sertraline  - Psych consulted for encephalopathy/delirium recs in s/o prolonged QTc; appreciate assistance  - Melatonin discontinued in favor of HS ramelteon per Psych recs  - PTA gabapentin resumed at reduced dose, look to titrate to PTA dosing in the next couple of days - HS dose increased to 900mg PTA dose 1/17  - Abilify BID for a time until encephalopathy improves before tapering to discontinuation. Will start taper tomorrow, with 2 mg daily for 3 days then stop   - Delirium precautions; sitter discontinued 1/16     / Renal / Fluid / Electrolytes:  Hypernatremia, resolved   AUSTIN/ATN, resolved  Hypervolemia, improving  Urinary retention  Hypokalemia, 2/2 diuresis   BL creat ~ 0.6-0.8, creatinine at baseline; adequate UOP with multiple unmeasured voids.   - Diuresis: Bumex 2 mg PO BID.   - 60 meq of potassium daily   - Replete lytes per protocol  - Strict I/O, daily weights  - Avoid/limit nephrotoxins as able  - Lymphedema consult for consideration of compression stocking to LLE  - Ongoing urinary retention, refusing ca catheter  - Scheduled flomax   - Continue straight catheterization per voiding protocol     GI / Nutrition:   Dysphagia   Severe malnutrition in the context of acute on chronic illness   Liquid stools   Orders Placed This Encounter      Regular Diet Adult  - Dietitian consulted and following,  appreciate assistance  - PRN bowel regimen  - PRN Imodium 2 mg QID PRN     Endocrine:  Stress-induced hyperglycemia, resolved  Type 2 diabetes mellitus   Hgb A1c 5.7  - Initially managed on insulin drip postop, transitioned to sliding scale & Lantus  - Goal BG <180 for optimal healing  - Lantus 20 bedtime (PTA dose 26 unit at bedtime)     Infectious Disease:  Stress-induced leukocytosis, resolved  Sternal wound infection, improving  Aspiration PNA: Klebsiella pneumoniae   WBC 12.3, afebrile  - Completed perioperative antibiotics  - Continue to monitor fever curve, CBC  - 1/10 C. Diff neg  - 1/14 infectious work-up for worsening leukocytosis negative thus far; follow for final results   - 1/14 BLE US neg for DVT  - 1/15 2-view CXR with low lung volumes, small bilateral pleural effusions but too small for drainage  - Defer respiratory viral panel given stable on RA, afebrile  - Klebsiella pneumoniae from sputum cultures on 12/31 and 1/9  - Zosyn and vancomycin were restarted 1/9 for fever. Sputum culture again grew Klebsiella pneumoniae.   - 1/11 noted to have small puss drainage from sternotomy    - CT chest on 1/12/25, not concerning for deep infection  - CT CAP 1/17 to evaluate abd pain, continued unexplained leukocytosis. Demonstrated large substernal fluid collection   - Continue Vanc/Zosyn per Dr. Rosenbaum, tentative end date 1/18, will continue with CT results. Definitive plan pending Dr. Rosenbaum     Hematology:   Acute blood loss anemia  Thrombocytopenia, resolved  Thrombocytosis, likely reactive  Therapeutic AC for PAF  Hgb stable; Plt 510 (569), no signs or symptoms of active bleeding  - CBC daily    Anticoagulation:   - ASA 81 mg   - PTA apixaban resumed for hx of PAF now s/p MAZE and LAAC; defer long-term AC plan to OP Cardiology    MSK/Skin:  Sternotomy  Surgical incisions  Hx RLE BKA  - Sternal precautions  - Incisional cares per protocol  - Will remove sutures from right groin today    Prophylaxis:   - Stress  "ulcer prophylaxis: Pantoprazole 40 mg daily for 30 days  - DVT prophylaxis: therapeutic AC, OOB/ambulation, SCD    Disposition:   - Transferred to  1/12  - Therapies recommending discharge to TCU - anticipate medical readiness tomorrow at conclusion of IV antibiotics    Dr. Rosenbaum updated.    Antonio Hernandez DNP APRN CNP CCRN   Cardiovascular Surgery   Available on [employer] or Secure Chat   Pager [phone]            Interval History:     Seen by surgery resident overnight for c/o abd pain which he states kept him from sleeping - asks for a muscle relaxer for this.  Additionally states that he has chronic back pain for which he was taking opioids PTA (60mg at a time, by his report) and this is limiting his tolerance of being up in the chair.  Expressing desire for feeding tube to be removed; states he will refuse further TF as he blames these for his ongoing diarrhea.  Reports being willing to eat but defers ordering food.    Dismissive at times, still forgetful/mildly confused but improved overall.  Denies SOB.  Denies sternal popping/clicking.         Physical Exam:     Gen: NAD, resting in bed, calm, cooperative on exam, conversant  Neuro: alert & grossly oriented, no focal deficits, face symmetric, speech clear, OGLESBY  CV: RRR on monitor - SR  Pulm: unlabored breathing on RA, occasional productive cough  Abd: SND, firm & tender to palpation in the central pelvis and LLQ, subcutaneous hematoma in LLQ, otherwise soft without guarding or peritoneal signs  Ext: moderate pitting peripheral edema in BLE L>R, well healed RLE BKA stump, R groin incision C/D/I with sutures in place - subtle erythema centrally  Incision: clean, dry, intact, no erythema or induration, sternum stable  Tubes/drain sites: CYRIL, scabbed over, no erythema or induration         Data:    /62 (BP Location: Left arm)   Pulse 73   Temp 97.5  F (36.4  C) (Oral)   Resp 18   Ht 1.803 m (5' 11\")   Wt 87.8 kg (193 lb 9 oz)   SpO2 97%   BMI 27.00 " kg/m      Vitals:    01/17/25 0800 01/17/25 1100 01/18/25 0600   Weight: 87.5 kg (192 lb 14.4 oz) 86.1 kg (189 lb 13.1 oz) 87.8 kg (193 lb 9 oz)     Admission wt:    76 kg  24-hr net fluid balance: -270 mL with multiple unmeasured voids      Intake/Output Summary (Last 24 hours) at 1/17/2025 1057  Last data filed at 1/17/2025 0100  Gross per 24 hour   Intake 840 ml   Output 1050 ml   Net -210 ml       Imaging:  Recent imaging reviewed    Recent Results (from the past 24 hours)   CT Chest/Abdomen/Pelvis w Contrast    Narrative    EXAMINATION: CT CHEST/ABDOMEN/PELVIS W CONTRAST, 1/17/2025 4:50 PM    INDICATION: unexplained leukocytosis, abd pain with lower abd firmness  to palpation    COMPARISON STUDY: Chest CT with contrast on 1/12/2025, CT chest  abdomen and pelvis without contrast on 1/4/2025    TECHNIQUE: CT scan of the chest, abdomen and pelvis was performed on  multidetector CT scanner using volumetric acquisition technique and  images were reconstructed in multiple planes with variable thickness  and reviewed on dedicated workstations.     CONTRAST: 115cc of isovue 370.     CT scan radiation dose is optimized to minimum requisite dose using  automated dose modulation techniques.    FINDINGS:    Chest:   Mediastinum: No suspicious thyroid nodules. Left PICC terminates in  the SVC. Heart size is within normal limits. Increased size of a large  retrosternal fluid collection along the right heart border, measuring  10.5 x 7.0 x 12.5 cm and exerting mass effect on the right heart.  There is mild peripheral enhancement on the anterolateral aspect of  the collection.    Pleura: Small bilateral pleural effusions. No pneumothorax.     Lungs: Fluid within the bronchus intermedius and right lower lobe  bronchi. Central tracheobronchial tree is patent. Passive atelectasis  in the lower lobes, greater on the right.    Abdomen and Pelvis:  Liver: Nodular liver.     Biliary System: Cholelithiasis. No gallbladder inflammation  or  extrahepatic biliary dilation.    Spleen: Normal.     Pancreas: No mass.     Adrenal glands: No mass or nodule.     Kidneys: Mild bilateral hydronephrosis. No obstructing calculus. The  renal parenchyma is blurred by motion artifact.     Gastrointestinal tract: Normal caliber of the bowel.     Pelvis: Marked dilation of the urinary bladder. No wall thickening.    Mesentery/peritoneum/retroperitoneum: Trace intraperitoneal fluid in  the [id number]. No free air.    Lymph nodes: No significant lymphadenopathy.    Vasculature: No aneurysm of the abdominal aorta.     Soft tissues: Diffuse subcutaneous anasarca. Decreased extensive  subcutaneous air in the right lower neck and right chest wall.  Findings of prior instrumentation in the bilateral inguinal regions.  Right groin post cannulation wound.    Osseous structures: Status post median sternotomy. No aggressive  osseous abnormality. Spinal stimulator leads project over the lower  thoracic spine.       Impression    IMPRESSION:   1.  Increased size of a large retrosternal fluid collection which  extends along the right heart border and exerts a degree of mass  effect on the heart. Mild peripheral enhancement suggests superimposed  infection.  2.  Findings of acute urinary retention with large distention of the  urinary bladder. There is mild bilateral hydroureter and  hydronephrosis.   3.  Small bilateral pleural effusions with associated atelectasis.  4.  Decreased subcutaneous air in the right chest wall and neck.  5.  Cirrhotic appearance of the liver.    I have personally reviewed the examination and initial interpretation  and I agree with the findings.    CHRISTINE ANDERSON MD         SYSTEM ID:  [id number]   XR Skull 1/3 Views    Narrative    Exam: XR SKULL 1/3 VIEWS  1/17/2025 4:52 PM      History: R/o retained foreign object.    Comparison: CT head 1/7/2025.    Findings: AP and lateral views of the skull. No metallic foreign body  is visualized. No obvious  fracture of the skull bones.      Impression    Impression: No evidence of metallic foreign body.    I have personally reviewed the examination and initial interpretation  and I agree with the findings.    FÁTIMA PATRICK MD         SYSTEM ID:  [id number]            Labs:  Recent Labs   Lab 01/18/25  0721 01/18/25  0509 01/18/25  0506 01/17/25  0826 01/17/25  0457 01/16/25  0903 01/16/25  0732 01/16/25  0434   WBC  --   --  12.3*  --  16.2*  --   --  16.8*   HGB  --   --  8.9*  --  8.8*  --   --  9.5*   MCV  --   --  97  --  97  --   --  92   PLT  --   --  470*  --  510*  --   --  569*   NA  --   --  140  --  138  --  139  --    POTASSIUM  --   --  3.3*  --  4.1  --  4.1  --    CHLORIDE  --   --  106  --  107  --  108*  --    CO2  --   --  24  --  22  --  21*  --    BUN  --   --  22.0  --  28.4*  --  27.3*  --    CR  --   --  0.66*  --  0.66*  --  0.58*  --    ANIONGAP  --   --  10  --  9  --  10  --    [place]  --   --  8.7*  --  8.3*  --  8.6*  --    GLC 98 84 111*   < > 168*   < > 230*  --     < > = values in this interval not displayed.      GLUCOSE:   Recent Labs   Lab 01/18/25  0721 01/18/25  0509 01/18/25  0506 01/17/25  2357 01/17/25  2231 01/17/25  1837   GLC 98 84 111* 125* 129* 164*

## 2025-01-18 NOTE — PLAN OF CARE
"Goal Outcome Evaluation:  Shift 1900 - 0730    Plan of Care Reviewed With: patient    Overall Patient Progress: improving    Outcome Evaluation: Sitter remains at bedside but pt is A&O x4, less irritable, and less confused. Responds to commands appropriately and is pleasant. Experiencing abd discomfort r/t urinary retention, st cath x2 overnight. Would like to speak with day shift provider about not wanting to place indwelling catheter.    Primary team: CVTS    NEURO: A&O x4. Pt is occasionally irritable, but calm and cooperative. Sensation absent in LLE per pt baseline.   RESPIRATORY: Course LS throughout. CHAVEZ, RA. Frequent, congested cough.   CARDIAC: SR, VSS. Pt denies chest pain.  GI/: No BM this shift, pt is passing gas. Straight cath x2 over NOC d/t pain from urinary retention, total OP 1550 mL. Urinary incontinence, 3 episodes this shift.    SKIN: No new deficits noted. See PCS for assessment and treatment of wounds and surgical incisions.   PAIN: Pt reports chronic back pain 4-7/10. Abdominal pain relieved with straight cath to remove urine. PRN tylenol and oxy available upon request.   MOBILITY: Ax2 for repositioning, with lift for transfer. R BKA.   DIET: Soft and bite sized, low sat fat < 2400 mg.   LDAs: L PIV SL. L DL PICC, red lumen TKO for abx and purple lumen SL.      PLAN: Continue with POC. Notify primary care team with any concerns/updates.     Intake/Output Summary (Last 24 hours) at 1/18/2025 0544  Last data filed at 1/17/2025 2350  Gross per 24 hour   Intake 700 ml   Output 2075 ml   Net -1375 ml     /64 (BP Location: Right arm)   Pulse 75   Temp 98.1  F (36.7  C) (Oral)   Resp 18   Ht 1.803 m (5' 11\")   Wt 86.1 kg (189 lb 13.1 oz)   SpO2 98%   BMI 26.47 kg/m       Problem: Cardiovascular Surgery  Goal: Improved Activity Tolerance  Outcome: Not Progressing  Intervention: Optimize Tolerance for Activity  Recent Flowsheet Documentation  Taken 1/17/2025 1945 by Onelia Peña, " "RN  Environmental Support:   calm environment promoted   distractions minimized   environmental consistency promoted   personal routine supported   rest periods encouraged  Goal: Effective Bowel Elimination  Outcome: Not Progressing  Intervention: Enhance Bowel Motility and Elimination  Recent Flowsheet Documentation  Taken 1/17/2025 1945 by Onelia Peña RN  Bowel Motility Enhancement:   ambulation promoted   fluid intake encouraged   oral intake encouraged  Goal: Effective Urinary Elimination  Outcome: Not Progressing  Intervention: Monitor and Manage Urinary Retention  Recent Flowsheet Documentation  Taken 1/17/2025 1945 by Onelia Peña RN  Urinary Elimination Promotion:   absorbent pad/diaper use encouraged   frequent voiding encouraged   toileting offered   voiding relaxation promoted     Problem: Adult Inpatient Plan of Care  Goal: Plan of Care Review  Description: The Plan of Care Review/Shift note should be completed every shift.  The Outcome Evaluation is a brief statement about your assessment that the patient is improving, declining, or no change.  This information will be displayed automatically on your shift  note.  Outcome: Progressing  Flowsheets (Taken 1/18/2025 0534)  Outcome Evaluation: Sitter remains at bedside but pt is A&O x4, less irritable, and less confused. Responds to commands appropriately and is pleasant. Experiencing abd discomfort r/t urinary retention, st cath x2 overnight. Would like to speak with day shift provider about not wanting to place indwelling catheter.  Plan of Care Reviewed With: patient  Overall Patient Progress: improving  Goal: Patient-Specific Goal (Individualized)  Description: You can add care plan individualizations to a care plan. Examples of Individualization might be:  \"Parent requests to be called daily at 9am for status\", \"I have a hard time hearing out of my right ear\", or \"Do not touch me to wake me up as it startles  me\".  Outcome: Progressing  Goal: " Absence of Hospital-Acquired Illness or Injury  Outcome: Progressing  Intervention: Identify and Manage Fall Risk  Recent Flowsheet Documentation  Taken 1/17/2025 1945 by Onelia Peña RN  Safety Promotion/Fall Prevention:   activity supervised   assistive device/personal items within reach   clutter free environment maintained   increased rounding and observation   increase visualization of patient   lighting adjusted   mobility aid in reach   nonskid shoes/slippers when out of bed   patient and family education   room near nurse's station   room organization consistent   safety round/check completed  Intervention: Prevent Skin Injury  Recent Flowsheet Documentation  Taken 1/18/2025 0425 by Onelia Peña RN  Body Position:   supine, head elevated   weight shifting  Taken 1/17/2025 2353 by Onelia Peña RN  Body Position:   supine, head elevated   weight shifting  Taken 1/17/2025 1945 by Onelia Peña RN  Body Position:   supine, head elevated   weight shifting  Skin Protection:   adhesive use limited   incontinence pads utilized   tubing/devices free from skin contact  Intervention: Prevent and Manage VTE (Venous Thromboembolism) Risk  Recent Flowsheet Documentation  Taken 1/17/2025 1945 by Onelia Peña RN  VTE Prevention/Management: SCDs off (sequential compression devices)  Intervention: Prevent Infection  Recent Flowsheet Documentation  Taken 1/17/2025 1945 by Onelia Peña RN  Infection Prevention:   environmental surveillance performed   hand hygiene promoted   personal protective equipment utilized   rest/sleep promoted   single patient room provided  Goal: Optimal Comfort and Wellbeing  Outcome: Progressing  Intervention: Monitor Pain and Promote Comfort  Recent Flowsheet Documentation  Taken 1/18/2025 0425 by Onelia Peña RN  Pain Management Interventions:   care clustered   emotional support   environmental changes   pillow support provided   relaxation techniques promoted    repositioned   rest  Taken 1/17/2025 2353 by Onelia Peña RN  Pain Management Interventions: (st cath performed)   care clustered   emotional support   environmental changes   pillow support provided   relaxation techniques promoted   repositioned   rest   other (see comments)  Taken 1/17/2025 1945 by Onelia Peña RN  Pain Management Interventions:   care clustered   emotional support   environmental changes   pillow support provided   relaxation techniques promoted   repositioned   rest  Intervention: Provide Person-Centered Care  Recent Flowsheet Documentation  Taken 1/17/2025 1945 by Onelia Peña RN  Trust Relationship/Rapport:   care explained   choices provided   empathic listening provided   questions answered   questions encouraged   reassurance provided   thoughts/feelings acknowledged  Goal: Readiness for Transition of Care  Outcome: Progressing     Problem: Comorbidity Management  Goal: Blood Glucose Levels Within Targeted Range  Outcome: Progressing  Intervention: Monitor and Manage Glycemia  Recent Flowsheet Documentation  Taken 1/17/2025 1945 by Onelia Peña RN  Medication Review/Management:   medications reviewed   high-risk medications identified  Goal: Blood Pressure in Desired Range  Outcome: Progressing  Intervention: Maintain Blood Pressure Management  Recent Flowsheet Documentation  Taken 1/17/2025 1945 by Onelia Peña RN  Medication Review/Management:   medications reviewed   high-risk medications identified     Problem: Risk for Delirium  Goal: Optimal Coping  Outcome: Progressing  Intervention: Optimize Psychosocial Adjustment to Delirium  Recent Flowsheet Documentation  Taken 1/17/2025 1945 by Onelia Peña RN  Supportive Measures:   active listening utilized   decision-making supported   relaxation techniques promoted   self-care encouraged   verbalization of feelings encouraged  Family/Support System Care:   involvement promoted   presence promoted   self-care  encouraged  Goal: Improved Behavioral Control  Outcome: Progressing  Intervention: Prevent and Manage Agitation  Recent Flowsheet Documentation  Taken 1/17/2025 1945 by Onelia Peña RN  Environment Familiarity/Consistency: daily routine followed  Intervention: Minimize Safety Risk  Recent Flowsheet Documentation  Taken 1/17/2025 1945 by Onelia Peña RN  Enhanced Safety Measures:   assistive devices when indicated   pain management   review medications for side effects with activity   room near unit station  Trust Relationship/Rapport:   care explained   choices provided   empathic listening provided   questions answered   questions encouraged   reassurance provided   thoughts/feelings acknowledged  Goal: Improved Attention and Thought Clarity  Outcome: Progressing  Intervention: Maximize Cognitive Function  Recent Flowsheet Documentation  Taken 1/17/2025 1945 by Onelia Peña RN  Sensory Stimulation Regulation:   care clustered   lighting decreased   quiet environment promoted  Reorientation Measures:   calendar in view   clock in view   familiar social contact encouraged  Goal: Improved Sleep  Outcome: Progressing  Intervention: Promote Sleep  Recent Flowsheet Documentation  Taken 1/17/2025 1945 by Onelia Peña RN  Sleep/Rest Enhancement:   awakenings minimized   comfort measures   regular sleep/rest pattern promoted   relaxation techniques promoted   room darkened     Problem: Cardiovascular Surgery  Goal: Optimal Coping with Heart Surgery  Outcome: Progressing  Intervention: Support Psychosocial Response to Surgery  Recent Flowsheet Documentation  Taken 1/17/2025 1945 by Onelia Peña RN  Supportive Measures:   active listening utilized   decision-making supported   relaxation techniques promoted   self-care encouraged   verbalization of feelings encouraged  Family/Support System Care:   involvement promoted   presence promoted   self-care encouraged  Goal: Absence of Bleeding  Outcome:  Progressing  Intervention: Monitor and Manage Bleeding  Recent Flowsheet Documentation  Taken 1/17/2025 1945 by Onelia Peña RN  Bleeding Management: dressing monitored  Goal: Effective Cardiac Function  Outcome: Progressing  Goal: Optimal Cerebral Tissue Perfusion  Outcome: Progressing  Intervention: Protect and Optimize Cerebral Perfusion  Recent Flowsheet Documentation  Taken 1/18/2025 0425 by Onelia Peña RN  Head of Bed (HOB) Positioning: HOB at 20-30 degrees  Taken 1/17/2025 2353 by Onelia Peña RN  Head of Bed (HOB) Positioning: HOB at 20-30 degrees  Taken 1/17/2025 1945 by Onelia Peña RN  Sensory Stimulation Regulation:   care clustered   lighting decreased   quiet environment promoted  Cerebral Perfusion Promotion: blood pressure monitored  Glycemic Management: blood glucose monitored  Head of Bed (HOB) Positioning: HOB at 20-30 degrees  Goal: Fluid and Electrolyte Balance  Outcome: Progressing  Goal: Blood Glucose Level Within Targeted Range  Outcome: Progressing  Intervention: Optimize Glycemic Control  Recent Flowsheet Documentation  Taken 1/17/2025 1945 by Onelia Peña RN  Glycemic Management: blood glucose monitored  Goal: Absence of Infection Signs and Symptoms  Outcome: Progressing  Intervention: Prevent or Manage Infection  Recent Flowsheet Documentation  Taken 1/17/2025 1945 by Onelia Peña RN  Infection Prevention:   environmental surveillance performed   hand hygiene promoted   personal protective equipment utilized   rest/sleep promoted   single patient room provided  Goal: Acceptable Pain Control  Outcome: Progressing  Intervention: Prevent or Manage Pain  Recent Flowsheet Documentation  Taken 1/18/2025 0425 by Onelia Peña RN  Pain Management Interventions:   care clustered   emotional support   environmental changes   pillow support provided   relaxation techniques promoted   repositioned   rest  Taken 1/17/2025 2353 by Onelia Peña RN  Pain Management  Interventions: (st cath performed)   care clustered   emotional support   environmental changes   pillow support provided   relaxation techniques promoted   repositioned   rest   other (see comments)  Taken 1/17/2025 1945 by Onelia Peña, RN  Pain Management Interventions:   care clustered   emotional support   environmental changes   pillow support provided   relaxation techniques promoted   repositioned   rest  Goal: Nausea and Vomiting Relief  Outcome: Progressing  Goal: Effective Oxygenation and Ventilation  Outcome: Progressing  Intervention: Promote Airway Secretion Clearance  Recent Flowsheet Documentation  Taken 1/17/2025 1945 by Onelia Peña, RN  Cough And Deep Breathing: done independently per patient

## 2025-01-18 NOTE — PROGRESS NOTES
Calorie Count  Intake recorded for: 1/17  Total Kcals: 0 Total Protein: 0g  Kcals from Hospital Food: 0   Protein: 0g  Kcals from Outside Food (average):0 Protein: 0g  # Meals Ordered from Kitchen: 0 meals ordered  # Meals Recorded: 0 meals recorded  # Supplements Recorded: none recorded

## 2025-01-19 ENCOUNTER — APPOINTMENT (OUTPATIENT)
Dept: SPEECH THERAPY | Facility: CLINIC | Age: 65
DRG: 003 | End: 2025-01-19
Payer: MEDICARE

## 2025-01-19 LAB
ANION GAP SERPL CALCULATED.3IONS-SCNC: 8 MMOL/L (ref 7–15)
BACTERIA BLD CULT: NO GROWTH
BACTERIA BLD CULT: NO GROWTH
BUN SERPL-MCNC: 16.2 MG/DL (ref 8–23)
CALCIUM SERPL-MCNC: 8.2 MG/DL (ref 8.8–10.4)
CHLORIDE SERPL-SCNC: 106 MMOL/L (ref 98–107)
CREAT SERPL-MCNC: 0.65 MG/DL (ref 0.67–1.17)
CRP SERPL-MCNC: 17.3 MG/L
EGFRCR SERPLBLD CKD-EPI 2021: >90 ML/MIN/1.73M2
ERYTHROCYTE [DISTWIDTH] IN BLOOD BY AUTOMATED COUNT: 17.2 % (ref 10–15)
GLUCOSE BLDC GLUCOMTR-MCNC: 102 MG/DL (ref 70–99)
GLUCOSE BLDC GLUCOMTR-MCNC: 106 MG/DL (ref 70–99)
GLUCOSE BLDC GLUCOMTR-MCNC: 116 MG/DL (ref 70–99)
GLUCOSE BLDC GLUCOMTR-MCNC: 121 MG/DL (ref 70–99)
GLUCOSE BLDC GLUCOMTR-MCNC: 138 MG/DL (ref 70–99)
GLUCOSE SERPL-MCNC: 115 MG/DL (ref 70–99)
HCO3 SERPL-SCNC: 27 MMOL/L (ref 22–29)
HCT VFR BLD AUTO: 26.2 % (ref 40–53)
HGB BLD-MCNC: 8.4 G/DL (ref 13.3–17.7)
MAGNESIUM SERPL-MCNC: 1.8 MG/DL (ref 1.7–2.3)
MCH RBC QN AUTO: 30.5 PG (ref 26.5–33)
MCHC RBC AUTO-ENTMCNC: 32.1 G/DL (ref 31.5–36.5)
MCV RBC AUTO: 95 FL (ref 78–100)
PLATELET # BLD AUTO: 326 10E3/UL (ref 150–450)
POTASSIUM SERPL-SCNC: 3 MMOL/L (ref 3.4–5.3)
POTASSIUM SERPL-SCNC: 3.1 MMOL/L (ref 3.4–5.3)
POTASSIUM SERPL-SCNC: 3.7 MMOL/L (ref 3.4–5.3)
RBC # BLD AUTO: 2.75 10E6/UL (ref 4.4–5.9)
SODIUM SERPL-SCNC: 141 MMOL/L (ref 135–145)
VANCOMYCIN SERPL-MCNC: 22 UG/ML
WBC # BLD AUTO: 6.4 10E3/UL (ref 4–11)

## 2025-01-19 PROCEDURE — 250N000013 HC RX MED GY IP 250 OP 250 PS 637: Performed by: NURSE PRACTITIONER

## 2025-01-19 PROCEDURE — 84520 ASSAY OF UREA NITROGEN: CPT

## 2025-01-19 PROCEDURE — 250N000011 HC RX IP 250 OP 636: Performed by: STUDENT IN AN ORGANIZED HEALTH CARE EDUCATION/TRAINING PROGRAM

## 2025-01-19 PROCEDURE — 250N000013 HC RX MED GY IP 250 OP 250 PS 637: Performed by: INTERNAL MEDICINE

## 2025-01-19 PROCEDURE — 250N000011 HC RX IP 250 OP 636: Performed by: NURSE PRACTITIONER

## 2025-01-19 PROCEDURE — 94640 AIRWAY INHALATION TREATMENT: CPT | Mod: 76

## 2025-01-19 PROCEDURE — 84132 ASSAY OF SERUM POTASSIUM: CPT | Performed by: STUDENT IN AN ORGANIZED HEALTH CARE EDUCATION/TRAINING PROGRAM

## 2025-01-19 PROCEDURE — 999N000157 HC STATISTIC RCP TIME EA 10 MIN

## 2025-01-19 PROCEDURE — 84132 ASSAY OF SERUM POTASSIUM: CPT | Performed by: INTERNAL MEDICINE

## 2025-01-19 PROCEDURE — 82310 ASSAY OF CALCIUM: CPT

## 2025-01-19 PROCEDURE — 85027 COMPLETE CBC AUTOMATED: CPT

## 2025-01-19 PROCEDURE — 86140 C-REACTIVE PROTEIN: CPT | Performed by: NURSE PRACTITIONER

## 2025-01-19 PROCEDURE — 250N000013 HC RX MED GY IP 250 OP 250 PS 637

## 2025-01-19 PROCEDURE — 80048 BASIC METABOLIC PNL TOTAL CA: CPT

## 2025-01-19 PROCEDURE — 94640 AIRWAY INHALATION TREATMENT: CPT

## 2025-01-19 PROCEDURE — 250N000011 HC RX IP 250 OP 636: Performed by: SURGERY

## 2025-01-19 PROCEDURE — 250N000013 HC RX MED GY IP 250 OP 250 PS 637: Performed by: STUDENT IN AN ORGANIZED HEALTH CARE EDUCATION/TRAINING PROGRAM

## 2025-01-19 PROCEDURE — 80202 ASSAY OF VANCOMYCIN: CPT | Performed by: STUDENT IN AN ORGANIZED HEALTH CARE EDUCATION/TRAINING PROGRAM

## 2025-01-19 PROCEDURE — 94668 MNPJ CHEST WALL SBSQ: CPT

## 2025-01-19 PROCEDURE — 250N000009 HC RX 250: Performed by: SURGERY

## 2025-01-19 PROCEDURE — 99221 1ST HOSP IP/OBS SF/LOW 40: CPT | Mod: 4UV | Performed by: UROLOGY

## 2025-01-19 PROCEDURE — 120N000003 HC R&B IMCU UMMC

## 2025-01-19 PROCEDURE — 83735 ASSAY OF MAGNESIUM: CPT | Performed by: STUDENT IN AN ORGANIZED HEALTH CARE EDUCATION/TRAINING PROGRAM

## 2025-01-19 PROCEDURE — 250N000009 HC RX 250

## 2025-01-19 PROCEDURE — 250N000013 HC RX MED GY IP 250 OP 250 PS 637: Performed by: SURGERY

## 2025-01-19 PROCEDURE — 92526 ORAL FUNCTION THERAPY: CPT | Mod: GN

## 2025-01-19 RX ORDER — POTASSIUM CHLORIDE 29.8 MG/ML
20 INJECTION INTRAVENOUS ONCE
Status: COMPLETED | OUTPATIENT
Start: 2025-01-19 | End: 2025-01-19

## 2025-01-19 RX ORDER — POTASSIUM CHLORIDE 1.5 G/1.58G
40 POWDER, FOR SOLUTION ORAL ONCE
Status: COMPLETED | OUTPATIENT
Start: 2025-01-19 | End: 2025-01-19

## 2025-01-19 RX ORDER — POTASSIUM CHLORIDE 750 MG/1
20 TABLET, EXTENDED RELEASE ORAL ONCE
Status: COMPLETED | OUTPATIENT
Start: 2025-01-19 | End: 2025-01-19

## 2025-01-19 RX ORDER — POTASSIUM CHLORIDE 750 MG/1
40 TABLET, EXTENDED RELEASE ORAL ONCE
Status: COMPLETED | OUTPATIENT
Start: 2025-01-19 | End: 2025-01-19

## 2025-01-19 RX ORDER — BUMETANIDE 2 MG/1
2 TABLET ORAL DAILY
Status: DISCONTINUED | OUTPATIENT
Start: 2025-01-19 | End: 2025-01-22

## 2025-01-19 RX ORDER — POTASSIUM CHLORIDE 1500 MG/1
60 TABLET, EXTENDED RELEASE ORAL DAILY
Status: DISCONTINUED | OUTPATIENT
Start: 2025-01-19 | End: 2025-01-20

## 2025-01-19 RX ADMIN — PIPERACILLIN SODIUM AND TAZOBACTAM SODIUM 3.38 G: 3; .375 INJECTION, SOLUTION INTRAVENOUS at 04:41

## 2025-01-19 RX ADMIN — SERTRALINE HYDROCHLORIDE 200 MG: 100 TABLET ORAL at 08:06

## 2025-01-19 RX ADMIN — CALCIUM CARBONATE (ANTACID) CHEW TAB 500 MG 1000 MG: 500 CHEW TAB at 08:07

## 2025-01-19 RX ADMIN — LOPERAMIDE HYDROCHLORIDE 2 MG: 2 CAPSULE ORAL at 16:42

## 2025-01-19 RX ADMIN — BUMETANIDE 2 MG: 2 TABLET ORAL at 08:07

## 2025-01-19 RX ADMIN — APIXABAN 5 MG: 5 TABLET, FILM COATED ORAL at 08:06

## 2025-01-19 RX ADMIN — GABAPENTIN 300 MG: 300 CAPSULE ORAL at 15:53

## 2025-01-19 RX ADMIN — METOPROLOL TARTRATE 50 MG: 25 TABLET, FILM COATED ORAL at 19:41

## 2025-01-19 RX ADMIN — ROSUVASTATIN CALCIUM 40 MG: 20 TABLET, FILM COATED ORAL at 08:08

## 2025-01-19 RX ADMIN — Medication 60 ML: at 08:08

## 2025-01-19 RX ADMIN — POTASSIUM CHLORIDE 60 MEQ: 1500 TABLET, EXTENDED RELEASE ORAL at 09:07

## 2025-01-19 RX ADMIN — THERA TABS 1 TABLET: TAB at 08:06

## 2025-01-19 RX ADMIN — Medication 5 ML: at 10:08

## 2025-01-19 RX ADMIN — POTASSIUM CHLORIDE 20 MEQ: 1500 TABLET, EXTENDED RELEASE ORAL at 09:07

## 2025-01-19 RX ADMIN — BACLOFEN 10 MG: 10 TABLET ORAL at 23:54

## 2025-01-19 RX ADMIN — SODIUM CHLORIDE SOLN NEBU 3% 3 ML: 3 NEBU SOLN at 07:59

## 2025-01-19 RX ADMIN — APIXABAN 5 MG: 5 TABLET, FILM COATED ORAL at 19:41

## 2025-01-19 RX ADMIN — ALBUTEROL SULFATE 2.5 MG: 2.5 SOLUTION RESPIRATORY (INHALATION) at 14:32

## 2025-01-19 RX ADMIN — PIPERACILLIN SODIUM AND TAZOBACTAM SODIUM 3.38 G: 3; .375 INJECTION, SOLUTION INTRAVENOUS at 09:06

## 2025-01-19 RX ADMIN — SODIUM CHLORIDE SOLN NEBU 3% 3 ML: 3 NEBU SOLN at 21:30

## 2025-01-19 RX ADMIN — METOPROLOL TARTRATE 50 MG: 25 TABLET, FILM COATED ORAL at 08:06

## 2025-01-19 RX ADMIN — BACLOFEN 10 MG: 10 TABLET ORAL at 00:01

## 2025-01-19 RX ADMIN — LOSARTAN POTASSIUM 25 MG: 25 TABLET, FILM COATED ORAL at 08:05

## 2025-01-19 RX ADMIN — RAMELTEON 8 MG: 8 TABLET ORAL at 21:02

## 2025-01-19 RX ADMIN — SODIUM CHLORIDE SOLN NEBU 3% 3 ML: 3 NEBU SOLN at 14:32

## 2025-01-19 RX ADMIN — BACLOFEN 10 MG: 10 TABLET ORAL at 15:55

## 2025-01-19 RX ADMIN — POTASSIUM CHLORIDE 40 MEQ: 750 TABLET, EXTENDED RELEASE ORAL at 08:05

## 2025-01-19 RX ADMIN — GABAPENTIN 900 MG: 300 CAPSULE ORAL at 21:03

## 2025-01-19 RX ADMIN — Medication 1250 MG: at 12:28

## 2025-01-19 RX ADMIN — ALBUTEROL SULFATE 2.5 MG: 2.5 SOLUTION RESPIRATORY (INHALATION) at 21:29

## 2025-01-19 RX ADMIN — ARIPIPRAZOLE 2 MG: 2 TABLET ORAL at 08:09

## 2025-01-19 RX ADMIN — PIPERACILLIN SODIUM AND TAZOBACTAM SODIUM 3.38 G: 3; .375 INJECTION, SOLUTION INTRAVENOUS at 21:18

## 2025-01-19 RX ADMIN — POTASSIUM CHLORIDE 20 MEQ: 29.8 INJECTION, SOLUTION INTRAVENOUS at 22:05

## 2025-01-19 RX ADMIN — ASPIRIN 81 MG CHEWABLE TABLET 81 MG: 81 TABLET CHEWABLE at 08:06

## 2025-01-19 RX ADMIN — POTASSIUM CHLORIDE 40 MEQ: 1.5 POWDER, FOR SOLUTION ORAL at 17:38

## 2025-01-19 RX ADMIN — PIPERACILLIN SODIUM AND TAZOBACTAM SODIUM 3.38 G: 3; .375 INJECTION, SOLUTION INTRAVENOUS at 15:57

## 2025-01-19 RX ADMIN — CALCIUM CARBONATE (ANTACID) CHEW TAB 500 MG 1000 MG: 500 CHEW TAB at 15:53

## 2025-01-19 RX ADMIN — BACLOFEN 10 MG: 10 TABLET ORAL at 08:07

## 2025-01-19 RX ADMIN — OXYCODONE HYDROCHLORIDE 10 MG: 10 TABLET ORAL at 19:41

## 2025-01-19 RX ADMIN — TAMSULOSIN HYDROCHLORIDE 0.4 MG: 0.4 CAPSULE ORAL at 08:06

## 2025-01-19 RX ADMIN — Medication 1250 MG: at 00:03

## 2025-01-19 RX ADMIN — ALBUTEROL SULFATE 2.5 MG: 2.5 SOLUTION RESPIRATORY (INHALATION) at 07:56

## 2025-01-19 RX ADMIN — Medication 1250 MG: at 23:57

## 2025-01-19 RX ADMIN — ALBUTEROL SULFATE 2.5 MG: 2.5 SOLUTION RESPIRATORY (INHALATION) at 02:14

## 2025-01-19 ASSESSMENT — ACTIVITIES OF DAILY LIVING (ADL)
ADLS_ACUITY_SCORE: 57
ADLS_ACUITY_SCORE: 59
ADLS_ACUITY_SCORE: 57
ADLS_ACUITY_SCORE: 59
ADLS_ACUITY_SCORE: 57

## 2025-01-19 NOTE — PLAN OF CARE
Shift [id number]   ?  A/I : Monitored vitals and assessed pt status. A0x4, sitter discontinued. Vitals stable on RA. Normal sinus rhythm. Afebrile. Continues to retain urine, and voiding frequently, straight cathed x3. Bmx4 this shift, loose, PRN imodium given. Poor appetite, intermittent nausea better with PRN zofran, needs encouragement to eat. Denies chest pain, palpitations, shortness of breath, nausea, dizziness. Intermittent pain in lower abdomen associated with bladder distention and straight cath. No new skin concerns observed, groin site changed twice this shift per plan of care. Patient refused to get up to chair this shift. IV access: PIV and PICC.       PRN: oxycodone, zofran   ?  P: Continue to monitor Pt status and report changes to treatment team.

## 2025-01-19 NOTE — PROVIDER NOTIFICATION
2506 throwing fit (arguing/cursing nurse) about his diuretic and   K pills. Mad about replacement protocal.  insists on talking to md. all day patient delayed taking potassium pills, level still 3.1. explained he is on bumex.     Sent to Adrian via ascom    4283 spoke with Adrian via phone, will be up to discuss with patient shortly. Informed patient provider is aware and will be rounding to discuss.

## 2025-01-19 NOTE — PROGRESS NOTES
CVTS Progress Note  01/19/2025         Assessment and Plan:     Erwin Aguilar is a 64 year old male with a past medical history of DM2, HTN, PVD, and neuropathic pain who initially presented on 12/31/24 to the ED with arm pain, vomiting, and diarrhea; ACS and ST depression. He subsequently had VT arrest with 1 round of CPR, epinephrine, defibrillation. After ROSC was found to be in afib with RVR. Became hypotensive and was subsequently cannulated for VA ECMO. Found to have multivessel disease s/p CABG x4 on 1/2/25 with Dr. Rosenbaum. ECMO decannulated on 1/3/25 at bedside. ICU course complicated by large R PTX seen on chest CT on 1/4/25 s/p bedside CT placement, VAP, and encephalopathy. Extubated 1/11/25.       Cardiovascular:   Hx of CAD - s/p CABG x4 with Dr. Rosenbaum   Mixed shock - cardiogenic, vasoplegic, resolved   HTN  Pre-op VT Cardiac Arrest on VA ECMO (1/1 - 1/3)  HLD  Prolonged Qtc, improved, QTc now 492   Hx PAF on DOAC PTA - s/p MAZE & LAAC  No arrhythmias overnight, HD stable, in NSR.   Post operative ECHO 1/5 with LVEF 55-60%, normal RV function, no pericardial effusion   1/17 Repeat echo in setting of therapeutic AC showed LVEF 60-65%, small, loculated pericardial effusion without HD compromise  -  mg daily  - Rosuvastatin 40 mg daily   - Metoprolol tartrate to 50 mg BID   - Losartan 25mg daily  - Diuresis as below  - AC as below    Chest tubes: Removed in ICU   TPW: Removed in ICU     Pulmonary:  Acute Hypoxic Respiratory failure, resolved   Respiratory alkalosis 2:2 agitation - resolved   R tension pneumothorax, resolved   VAP  SubQ emphysema, improving  Right hemidiaphragm elevation  Bilateral pleural effusions, small & asymptomatic  Extubated POD 9. Now saturating well on RA.  - Supplemental O2 PRN to keep sats > 92%  - Pulm hygiene, IS, OOB/activity and deep breathing  - ABX detailed below   - PRN Mucomyst and Duoneb  - QID 3% saline nebs + Albuterol  - Resume chest physiotherapy per patient  request, stated it was helpful with mobilizing secretions   - Scheduled and prn albuterol nebs (PTA albuterol inhaler PRN)    Neurology:  Acute post-operative pain, improved  Encephalopathy  Delirium   Anxiety   Chronic pain with opioid dependence  Neuropathy, PTA  Insomnia   Multimodal analgesia regimen:  - Acetaminophen PRN  - Lidocaine patches  - Modest dose Robaxin PRN in setting of improving delirium  - PRN hydroxyzine discontinued in s/o delirium   - PO oxycodone PRN frequency reduced (PTA 10mg TID PRN)  - PTA baclofen   - 1/1 vEEG without seizure activity (severe encephalopathy) and 12/31, 1/4, and 1/7 CTH negative for acute findings.   - PTA Sertraline  - Psych consulted for encephalopathy/delirium recs in s/o prolonged QTc; appreciate assistance  - Melatonin discontinued in favor of HS ramelteon per Psych recs  - PTA gabapentin resumed at reduced dose, look to titrate to PTA dosing in the next couple of days - HS dose increased to 900mg PTA dose 1/17  - Abilify BID for a time until encephalopathy improves before tapering to discontinuation. Startec taper 1/19/24, with 2 mg daily for 3 days then stop   - Delirium precautions; sitter discontinued 1/16     / Renal / Fluid / Electrolytes:  Hypernatremia, resolved   AUSTIN/ATN, resolved  Hypervolemia, improving  Urinary retention  Hypokalemia, 2/2 diuresis   BL creat ~ 0.6-0.8, creatinine at baseline; adequate UOP with multiple unmeasured voids.   - Diuresis: Bumex 2 mg PO BID.   - 60 meq of potassium daily   - Replete lytes per protocol  - Strict I/O, daily weights  - Avoid/limit nephrotoxins as able  - Lymphedema consult for consideration of compression stocking to LLE  - Ongoing urinary retention  - Rodriguez catheter placed  - Urology consult placed   - Continue flomax     GI / Nutrition:   Dysphagia   Severe malnutrition in the context of acute on chronic illness   Liquid stools   Orders Placed This Encounter      Regular Diet Adult  - Dietitian consulted and  following, appreciate assistance  - PRN bowel regimen  - PRN Imodium 2 mg QID PRN     Endocrine:  Stress-induced hyperglycemia, resolved  Type 2 diabetes mellitus   Hgb A1c 5.7  - Initially managed on insulin drip postop, transitioned to sliding scale & Lantus  - Goal BG <180 for optimal healing  - Lantus 20 bedtime (PTA dose 26 unit at bedtime)     Infectious Disease:  Stress-induced leukocytosis, resolved  Sternal wound infection  Aspiration PNA: Klebsiella pneumoniae   WBC 6.4, afebrile  - Completed perioperative antibiotics  - Continue to monitor fever curve, CBC  - 1/10 C. Diff neg  - 1/14 infectious work-up for worsening leukocytosis negative thus far; follow for final results   - 1/14 BLE US neg for DVT  - 1/15 2-view CXR with low lung volumes, small bilateral pleural effusions but too small for drainage  - Defer respiratory viral panel given stable on RA, afebrile  - Klebsiella pneumoniae from sputum cultures on 12/31 and 1/9  - Zosyn and vancomycin were restarted 1/9 for fever. Sputum culture again grew Klebsiella pneumoniae.   - 1/11 noted to have small puss drainage from sternotomy    - CT chest on 1/12/25, not concerning for deep infection  - CT CAP 1/17 to evaluate abd pain, continued unexplained leukocytosis. Demonstrated large substernal fluid collection   - Continue Vanc/Zosyn per Dr. Rosenbaum, continue with CT results. Definitive plan pending Dr. Rosenbaum   - Continues with small/scant pus drainage from incision   - CRP 17.3     Hematology:   Acute blood loss anemia  Thrombocytopenia, resolved  Thrombocytosis, likely reactive, resolved   Therapeutic AC for PAF  Hgb stable; Plt WNL, no signs or symptoms of active bleeding  - CBC daily    Anticoagulation:   - ASA 81 mg   - PTA apixaban resumed for hx of PAF now s/p MAZE and LAAC; defer long-term AC plan to OP Cardiology    MSK/Skin:  Sternotomy  Surgical incisions  Hx RLE BKA  - Sternal precautions  - Incisional cares per protocol  - Right groin decannulation  "wound dehisced at apex with staple removal, packing wet to dry BID    Prophylaxis:   - Stress ulcer prophylaxis: Pantoprazole 40 mg daily for 30 days  - DVT prophylaxis: therapeutic AC, OOB/ambulation, SCD    Disposition:   - Transferred to  1/12  - Therapies recommending discharge to TCU - anticipate medical readiness tomorrow at conclusion of IV antibiotics    Dr. Rosenbaum updated.    Antonio Hernandez DNP APRN CNP CCRN   Cardiovascular Surgery   Available on Vocea or Secure Chat   Pager [phone]            Interval History:     Agreeable to ca catheter placement overnight.   Stated family planning on bringing his wheel chair.   Mentation at baseline.   Denies SOB, states he is still having trouble mobilizing secretions.   Denies sternal popping/clicking.         Physical Exam:     Gen: NAD, resting in bed, calm, cooperative on exam, conversant  Neuro: alert & grossly oriented, no focal deficits.  CV: RRR on monitor - SR  Pulm: unlabored breathing on RA, occasional productive cough  Abd: + bowel sounds. Non-tender.   Ext: moderate pitting peripheral edema in BLE L>R, well healed RLE BKA stump,   Sternal incision intact, no erythema or induration, sternum stable, + small/scant pus drainage  R groin incision covered, dressing c/d/I   Tubes/drain sites: CYRIL, scabbed over, no erythema or induration         Data:    /65 (BP Location: Right arm)   Pulse 79   Temp 97.6  F (36.4  C) (Oral)   Resp 18   Ht 1.803 m (5' 11\")   Wt 82.9 kg (182 lb 12.2 oz)   SpO2 99%   BMI 25.49 kg/m      Vitals:    01/17/25 1100 01/18/25 0600 01/19/25 0430   Weight: 86.1 kg (189 lb 13.1 oz) 87.8 kg (193 lb 9 oz) 82.9 kg (182 lb 12.2 oz)       Imaging:  Recent imaging reviewed    No results found for this or any previous visit (from the past 24 hours).           Labs:  Recent Labs   Lab 01/19/25  0836 01/19/25  0812 01/19/25  0437 01/18/25  2150 01/18/25  2144 01/18/25  1306 01/18/25  1211 01/18/25  0509 01/18/25  0506 " 01/17/25  0826 01/17/25  0457   WBC  --   --  6.4  --   --   --   --   --  12.3*  --  16.2*   HGB  --   --  8.4*  --   --   --   --   --  8.9*  --  8.8*   MCV  --   --  95  --   --   --   --   --  97  --  97   PLT  --   --  326  --   --   --   --   --  470*  --  510*   NA  --   --  141  --   --   --   --   --  140  --  138   POTASSIUM  --   --  3.0*  --  3.1*  --  3.4  --  3.3*  --  4.1   CHLORIDE  --   --  106  --   --   --   --   --  106  --  107   CO2  --   --  27  --   --   --   --   --  24  --  22   BUN  --   --  16.2  --   --   --   --   --  22.0  --  28.4*   CR  --   --  0.65*  --   --   --   --   --  0.66*  --  0.66*   ANIONGAP  --   --  8  --   --   --   --   --  10  --  9   [place]  --   --  8.2*  --   --   --   --   --  8.7*  --  8.3*   * 116* 115*   < >  --    < >  --    < > 111*   < > 168*    < > = values in this interval not displayed.      GLUCOSE:   Recent Labs   Lab 01/19/25  0836 01/19/25  0812 01/19/25  0437 01/18/25  2150 01/18/25  1655 01/18/25  1306   * 116* 115* 105* 138* 107*

## 2025-01-19 NOTE — PROGRESS NOTES
Calorie Count  Intake recorded for: 1/18  Total Kcals: 473 Total Protein: 18g  Kcals from Hospital Food: 473   Protein: 18g  Kcals from Outside Food (average):0 Protein: 0g  # Meals Ordered from Kitchen: 2 meals ordered  # Meals Recorded: 1 meal recorded-- 100% scrambled eggs, 2 ramirez slices, banana, grapes, lemon lime soda  # Supplements Recorded: none recorded

## 2025-01-19 NOTE — PLAN OF CARE
"Goal Outcome Evaluation:  Shift 1900 - 0730    Plan of Care Reviewed With: patient    Overall Patient Progress: improving    Outcome Evaluation: Sitter discontinued, pt A&O x4. Rodriguez catheter placed yesterday night and pt denies pain. Able to turn in bed with minimal assistance.    Primary team: CVTS     NEURO: A&O x4. Pt is pleasant and cooperative. Sensation absent in BLE per pt baseline.   RESPIRATORY: Course LS throughout. CHAVEZ, 1L NC overnight to keep sats > 90%. Frequent, congested cough.   CARDIAC: SR, VSS. Pt denies chest pain.  GI/: 1 medium, loose incontinent BM this shift. Rodriguez cath placed in the evening with initial OP 1900 mL.   SKIN: No new deficits noted. See PCS for assessment and treatment of wounds and surgical incisions.   PAIN: Pt denies pain this shift.  MOBILITY: Ax2 for repositioning, with lift for transfer. R BKA.   DIET: Regular adult diet, poor appetite.   LDAs: L PIV SL. L DL PICC, red lumen TKO for abx and purple lumen SL.       PLAN: Continue with POC. Notify primary care team with any concerns/updates.     Intake/Output Summary (Last 24 hours) at 1/19/2025 0506  Last data filed at 1/19/2025 0430  Gross per 24 hour   Intake 1680 ml   Output 4650 ml   Net -2970 ml     /60 (BP Location: Right arm)   Pulse 71   Temp 96.9  F (36.1  C) (Oral)   Resp 20   Ht 1.803 m (5' 11\")   Wt 82.9 kg (182 lb 12.2 oz)   SpO2 98%   BMI 25.49 kg/m       Problem: Adult Inpatient Plan of Care  Goal: Plan of Care Review  Description: The Plan of Care Review/Shift note should be completed every shift.  The Outcome Evaluation is a brief statement about your assessment that the patient is improving, declining, or no change.  This information will be displayed automatically on your shift  note.  Outcome: Progressing  Flowsheets (Taken 1/19/2025 0318)  Outcome Evaluation: Sitter discontinued, pt A&O x4. Rodriguez catheter placed yesterday night and pt denies pain. Able to turn in bed with minimal " "assistance.  Plan of Care Reviewed With: patient  Overall Patient Progress: improving  Goal: Patient-Specific Goal (Individualized)  Description: You can add care plan individualizations to a care plan. Examples of Individualization might be:  \"Parent requests to be called daily at 9am for status\", \"I have a hard time hearing out of my right ear\", or \"Do not touch me to wake me up as it startles  me\".  Outcome: Progressing  Goal: Absence of Hospital-Acquired Illness or Injury  Outcome: Progressing  Intervention: Identify and Manage Fall Risk  Recent Flowsheet Documentation  Taken 1/18/2025 2030 by Onelia Peña RN  Safety Promotion/Fall Prevention:   activity supervised   assistive device/personal items within reach   clutter free environment maintained   increased rounding and observation   increase visualization of patient   lighting adjusted   mobility aid in reach   nonskid shoes/slippers when out of bed   patient and family education   room near nurse's station   room organization consistent   safety round/check completed  Intervention: Prevent Skin Injury  Recent Flowsheet Documentation  Taken 1/19/2025 0000 by Onelia Peña RN  Body Position:   supine, head elevated   weight shifting  Taken 1/18/2025 2030 by Onelia Peña RN  Body Position:   supine, head elevated   weight shifting  Skin Protection:   adhesive use limited   incontinence pads utilized   tubing/devices free from skin contact  Intervention: Prevent and Manage VTE (Venous Thromboembolism) Risk  Recent Flowsheet Documentation  Taken 1/18/2025 2030 by Onelia Peña RN  VTE Prevention/Management: SCDs off (sequential compression devices)  Intervention: Prevent Infection  Recent Flowsheet Documentation  Taken 1/18/2025 2030 by Onelia Peña RN  Infection Prevention:   environmental surveillance performed   hand hygiene promoted   personal protective equipment utilized   rest/sleep promoted   single patient room provided  Goal: Optimal " Comfort and Wellbeing  Outcome: Progressing  Intervention: Provide Person-Centered Care  Recent Flowsheet Documentation  Taken 1/18/2025 2030 by Onelia Peña RN  Trust Relationship/Rapport:   care explained   choices provided   empathic listening provided   questions answered   questions encouraged   reassurance provided   thoughts/feelings acknowledged  Goal: Readiness for Transition of Care  Outcome: Progressing     Problem: Comorbidity Management  Goal: Blood Glucose Levels Within Targeted Range  Outcome: Progressing  Intervention: Monitor and Manage Glycemia  Recent Flowsheet Documentation  Taken 1/18/2025 2030 by Onelia Peña RN  Medication Review/Management:   medications reviewed   high-risk medications identified  Goal: Blood Pressure in Desired Range  Outcome: Progressing  Intervention: Maintain Blood Pressure Management  Recent Flowsheet Documentation  Taken 1/18/2025 2030 by Onelia Peña RN  Medication Review/Management:   medications reviewed   high-risk medications identified     Problem: Risk for Delirium  Goal: Optimal Coping  Outcome: Progressing  Intervention: Optimize Psychosocial Adjustment to Delirium  Recent Flowsheet Documentation  Taken 1/18/2025 2030 by Onelia Peña RN  Supportive Measures:   active listening utilized   decision-making supported   relaxation techniques promoted   self-care encouraged   verbalization of feelings encouraged  Family/Support System Care:   involvement promoted   presence promoted   self-care encouraged  Goal: Improved Behavioral Control  Outcome: Progressing  Intervention: Prevent and Manage Agitation  Recent Flowsheet Documentation  Taken 1/18/2025 2030 by Onelia Peña RN  Environment Familiarity/Consistency: daily routine followed  Intervention: Minimize Safety Risk  Recent Flowsheet Documentation  Taken 1/18/2025 2030 by Onelia Peña RN  Enhanced Safety Measures:   assistive devices when indicated   pain management   review medications  for side effects with activity   room near unit station  Trust Relationship/Rapport:   care explained   choices provided   empathic listening provided   questions answered   questions encouraged   reassurance provided   thoughts/feelings acknowledged  Goal: Improved Attention and Thought Clarity  Outcome: Progressing  Intervention: Maximize Cognitive Function  Recent Flowsheet Documentation  Taken 1/18/2025 2030 by Onelia Peña RN  Sensory Stimulation Regulation:   care clustered   lighting decreased   quiet environment promoted  Reorientation Measures:   calendar in view   clock in view   familiar social contact encouraged  Goal: Improved Sleep  Outcome: Progressing  Intervention: Promote Sleep  Recent Flowsheet Documentation  Taken 1/18/2025 2030 by Onelia Peña RN  Sleep/Rest Enhancement:   awakenings minimized   comfort measures   regular sleep/rest pattern promoted   relaxation techniques promoted   room darkened     Problem: Cardiovascular Surgery  Goal: Improved Activity Tolerance  Outcome: Progressing  Intervention: Optimize Tolerance for Activity  Recent Flowsheet Documentation  Taken 1/18/2025 2030 by Onelia Peña RN  Environmental Support:   calm environment promoted   distractions minimized   environmental consistency promoted   personal routine supported   rest periods encouraged  Goal: Optimal Coping with Heart Surgery  Outcome: Progressing  Intervention: Support Psychosocial Response to Surgery  Recent Flowsheet Documentation  Taken 1/18/2025 2030 by Onelia Peña RN  Supportive Measures:   active listening utilized   decision-making supported   relaxation techniques promoted   self-care encouraged   verbalization of feelings encouraged  Family/Support System Care:   involvement promoted   presence promoted   self-care encouraged  Goal: Absence of Bleeding  Outcome: Progressing  Intervention: Monitor and Manage Bleeding  Recent Flowsheet Documentation  Taken 1/18/2025 2030 by Casey  RODERICK Rousseau  Bleeding Management: dressing monitored  Goal: Effective Bowel Elimination  Outcome: Progressing  Intervention: Enhance Bowel Motility and Elimination  Recent Flowsheet Documentation  Taken 1/18/2025 2030 by Onelia Peña RN  Bowel Motility Enhancement:   ambulation promoted   fluid intake encouraged   oral intake encouraged  Goal: Effective Cardiac Function  Outcome: Progressing  Goal: Optimal Cerebral Tissue Perfusion  Outcome: Progressing  Intervention: Protect and Optimize Cerebral Perfusion  Recent Flowsheet Documentation  Taken 1/19/2025 0000 by Onelia Peña RN  Head of Bed (HOB) Positioning: HOB at 20-30 degrees  Taken 1/18/2025 2030 by Onelia Peña RN  Sensory Stimulation Regulation:   care clustered   lighting decreased   quiet environment promoted  Cerebral Perfusion Promotion: blood pressure monitored  Glycemic Management: blood glucose monitored  Head of Bed (HOB) Positioning: HOB at 20-30 degrees  Goal: Fluid and Electrolyte Balance  Outcome: Progressing  Goal: Blood Glucose Level Within Targeted Range  Outcome: Progressing  Intervention: Optimize Glycemic Control  Recent Flowsheet Documentation  Taken 1/18/2025 2030 by Onelia Peña RN  Glycemic Management: blood glucose monitored  Goal: Absence of Infection Signs and Symptoms  Outcome: Progressing  Intervention: Prevent or Manage Infection  Recent Flowsheet Documentation  Taken 1/18/2025 2030 by Onelia Peña RN  Infection Prevention:   environmental surveillance performed   hand hygiene promoted   personal protective equipment utilized   rest/sleep promoted   single patient room provided  Goal: Acceptable Pain Control  Outcome: Progressing  Intervention: Prevent or Manage Pain  Recent Flowsheet Documentation  Taken 1/18/2025 2030 by Onelia Peña RN  Diversional Activities: television  Goal: Nausea and Vomiting Relief  Outcome: Progressing  Goal: Effective Urinary Elimination  Outcome: Progressing  Intervention: Monitor  and Manage Urinary Retention  Recent Flowsheet Documentation  Taken 1/18/2025 2030 by Onelia Peña RN  Urinary Elimination Promotion:   absorbent pad/diaper use encouraged   frequent voiding encouraged   toileting offered   voiding relaxation promoted  Goal: Effective Oxygenation and Ventilation  Outcome: Progressing  Intervention: Promote Airway Secretion Clearance  Recent Flowsheet Documentation  Taken 1/18/2025 2030 by Onelia Peña, RN  Cough And Deep Breathing: done independently per patient  Airway/Ventilation Management: pulmonary hygiene promoted

## 2025-01-19 NOTE — PROGRESS NOTES
Care Management Follow Up    Length of Stay (days): 19    Expected Discharge Date: 01/20/2025     Concerns to be Addressed: denies needs/concerns at this time     Patient plan of care discussed at interdisciplinary rounds: Yes    Anticipated Discharge Disposition: Skilled Nursing Facility        Anticipated Discharge Services: None  Anticipated Discharge DME: None    Patient/family educated on Medicare website which has current facility and service quality ratings: no  Education Provided on the Discharge Plan: Yes  Patient/Family in Agreement with the Plan:  Yes    Referrals Placed by CM/SW:    Private pay costs discussed: Not applicable    Discussed  Partnership in Safe Discharge Planning  document with patient/family: No     Handoff Completed: No, handoff not indicated or clinically appropriate    Additional Information:  SW following for discharge planning. PT/OT currently recommending TCU. SW delegated by Weekday SW to follow up on TCU discussion with patient. Per CVTS, patient med ready date for discharge currently unknown, needs treatment plan for sternal wound infection.     KALEIGH met with pt at bedside to discuss discharge planning. SW shared PT/OT recommendations for TCU and provided some education on TCU services. Pt reports that his partner (Isabella) has been to many TCUs in the past so they are somewhat familiar with the services. SW provided pt with Medicare Care Compare list and requested that he review list and make 5 choices for SW to send referrals to. Pt requested that referral be sent to [facility]. Pt plans to review list and make additional choices. SW discussed typical Medicare coverage for TCU with pt. Pt also recounted surgical history with SW including some hardships he has faced in the past. SW provided supportive listening. Pt had no other questions or concerns for SW at this time. Pt is agreeable to TCU discharge.    Next Steps: SW to continue to follow for discharge planning.     Once  appropriate, SW to send TCU referral to [facility] and obtain other choices from patient.     Fabiana Jimenez, [facility]  1/19/2025       Social Work and Care Management Department       SEARCHABLE in [facility] - search SOCIAL WORK       [place] (0800 - 1630) Saturday and Sunday     Units: 4A Vocera, 4C Vocera, & 4E Vocera        Units: 5A 5080-7900 Vocera, 5A 3349-2675 Vocera , BMT SW 1 BMT SW 2, BMT SW 3 & BMT SW 4  5C Off Service 5401 - 5416  5C Off Service [id number]     Units: 6A Vocera & 6B Vocera      Units: 6C Vocera     Units: 7A Vocera & 7B Vocera      Units: 7C Med Surg 7401 thru 7418 and 7C Med Surg 7502 thru 7521      Unit: [place] ED Vocera & [place] Obs Vocera     [facility] (0827-7183) Saturday and Sunday      Units: 5 Ortho Vocera, 5 Med Surg Vocera & WB ED Vocera     Units: 6 Med Surg Vocera, 8 Med Surg Vocera, & 10 ICU Vocera      After hours Vocera [facility] and After Hours Vocera [place]     Please NOTE changes to times below:    **Saturday & Sunday (1630 - 2030)    **Mon-Fri ([id number])     **[facility] Recognized Holidays  ([id number])    Units: ALL   - see above VOCERA links to units

## 2025-01-19 NOTE — PHARMACY-VANCOMYCIN DOSING SERVICE
"Pharmacy Vancomycin Note  Date of Service 2025  Patient's  1960   64 year old, male, ABW 82.9 kg    Indication: sternal wound infection (previous bacteremia now thought to only be contaminant)   Day of Therapy: 11  Current vancomycin regimen:  1250 mg IV q12h  Current vancomycin monitoring method: AUC  Current vancomycin therapeutic monitoring goal: 400-600 mg*h/L    [employer] Prediction of Current Vancomycin Regimen        Current estimated CrCl = Estimated Creatinine Clearance: 132.9 mL/min (A) (based on SCr of 0.65 mg/dL (L)).    Creatinine for last 3 days  2025:  7:32 AM Creatinine 0.58 mg/dL  2025:  4:57 AM Creatinine 0.66 mg/dL  2025:  5:06 AM Creatinine 0.66 mg/dL  2025:  4:37 AM Creatinine 0.65 mg/dL    Recent Vancomycin Levels (past 3 days)  2025:  4:37 AM Vancomycin 22.0 ug/mL - 4.5 hrs post-dose    Vancomycin IV Administrations (past 72 hours)                     vancomycin (VANCOCIN) 1,250 mg in 0.9% NaCl 250 mL intermittent infusion (mg) 1,250 mg New Bag 25 0003     1,250 mg New Bag 25 1212     1,250 mg New Bag  0001     1,250 mg New Bag 25 1308     1,250 mg New Bag  0001     1,250 mg New Bag 25 1359                    Nephrotoxins and other renal medications (From now, onward)      Start     Dose/Rate Route Frequency Ordered Stop    25 0830  bumetanide (BUMEX) tablet 2 mg         2 mg Oral 2 TIMES DAILY (Diuretics and Nitrates) 25 0815      25 1300  piperacillin-tazobactam (ZOSYN) intermittent infusion 3.375 g        Note to Pharmacy: For [facility], [facility] and [facility]: For Zosyn-naive patients, use the \"Zosyn initial dose + extended infusion\" order panel.    3.375 g  100 mL/hr over 30 Minutes Intravenous EVERY 6 HOURS 25 0742      25 2000  vancomycin (VANCOCIN) 1,250 mg in 0.9% NaCl 250 mL intermittent infusion         1,250 mg  166.7 mL/hr over 90 Minutes Intravenous EVERY 12 HOURS 25 1256             "     Contrast Orders - past 72 hours (72h ago, onward)      Start     Dose/Rate Route Frequency Stop    01/17/25 1700  iopamidol (ISOVUE-370) solution 115 mL         115 mL Intravenous ONCE 01/17/25 1635    01/17/25 0800  perflutren diluted 1mL to 2mL with saline (OPTISON) diluted injection 5 mL         5 mL Intravenous ONCE 01/17/25 0751            Interpretation of levels and current regimen:  Vancomycin level is reflective of -600    Has serum creatinine changed greater than 50% in last 72 hours: No    Urine output:  good urine output    Renal Function: Stable    Plan:  Continue Current Dose vancomycin 1250 mg IV q12h  Vancomycin monitoring method: AUC  Vancomycin therapeutic monitoring goal: 400-600 mg*h/L  Pharmacy will check vancomycin levels as appropriate in 1-3 Days.  Serum creatinine levels will be ordered daily for the first week of therapy and at least twice weekly for subsequent weeks.    Bryson Mckeon, PharmD, BCPS

## 2025-01-19 NOTE — CONSULTS
Urology Consult History and Physical    Name: Erwin Aguilar    MRN: [id number]                  Chief Complaint:   Urinary retention  History is obtained from the patient          History of Present Illness:   Erwin Aguilar is a 64 year old male with urinary retention requiring ca catheter. PMH of recent ICU stay for cardiac issues after CABG as well as cauda equina after complicated spinal surgery and no previous urologic history    He is hospitalized for complications following CABG Dec 31, including time on ECMO in the cardiac ICU.    Today he is vitally stable and afebrile. He denies hematuria, dysuria, or flank pain. He notes that he has chronic issues with urination since developing cauda equina syndrome following back surgery.          Past Medical History:     Past Medical History:   Diagnosis Date    Actinic keratosis     aldara    Anxiety 12/1997    Cancer (H)     squamous cell skin CA    Cauda equina spinal cord injury (H)     Chronic sinusitis 05/01/2016    Cirrhosis of liver (H) 04/01/2014    Not biopsy-proven as of 4/1/14.      Depressive disorder     Diastasis recti     Esophageal reflux     Esophageal varices in cirrhosis (H) 04/01/2014    Hospitalized for UGI blee 3/28/14, endoscopy revealed bleeding varices.    Essential hypertension, benign     Intermittent asthma     Mild depression     Mixed hyperlipidemia     Nasal polyps 05/01/2016    Osteoarthritis of lumbar spine, unspecified spinal osteoarthritis complication status     Other chronic pain     Paroxysmal atrial fibrillation (H) 09/22/2021    SCCA (squamous cell carcinoma) of skin     Seasonal allergic conjunctivitis     Type II or unspecified type diabetes mellitus without mention of complication, not stated as uncontrolled     Unspecified site of spinal cord injury without evidence of spinal bone injury     due to back surgery            Past Surgical History:     Past Surgical History:   Procedure Laterality Date    ABDOMEN SURGERY   2014    AMPUTATE LEG BELOW KNEE Right 05/09/2024    Procedure: Right below knee amputation (transtibial amputation);  Surgeon: Kurtis Nye MD;  Location: [facility]    ANGIOGRAM Right 04/23/2024    Procedure: Ultrasound guided percutaneous transarterial left common femoral artery access, diagnostic aortoiliac angiogram, selective cannulation of right comon iliac, righ external iliac, right common femoral, and right superficial femoral artery, balloon angioplasty of right superficial femoral artery, 6mm x 12 cm self-expanding drug-coated stent placement of right superficial femoral artery, lef    BACK SURGERY  08/2009    BYPASS GRAFT ARTERY CORONARY N/A 01/02/2025    Procedure: Median Sternotomy, on Cardiopulmonary Bypass Pump, Left Internal Mammary Artery [place], Endoscopic [place] of Left Greater Saphenous Vein, Coronary Artery Bypass Graft x 4, Left Atrial Appendage Ligation, Left Atrial Ablation, and Intraoperative Transesophageal Echocardiogram By Anesthesia;  Surgeon: Bernice Rosenbaum MD;  [facility]    COLONOSCOPY N/A 05/12/2016    Procedure: COMBINED COLONOSCOPY, SINGLE OR MULTIPLE BIOPSY/POLYPECTOMY BY BIOPSY;  Surgeon: Ana Paula Urbina MD;  [facility]    DECOMPRESSION CUBITAL TUNNEL Left 08/31/2023    Procedure: LEFT CUBITAL TUNNEL RELEASE,;  Surgeon: Deny Dixon MD;  [place] [facility] [place]    ENDOSCOPY UPPER, COLONOSCOPY, COMBINED  10/19/2011    Procedure:COMBINED ENDOSCOPY UPPER, COLONOSCOPY; Upper Endoscopy, Colonoscopy with Polypectomy x4; Surgeon:AMBAR RODRÍGUEZ; Location:[facility]    ENT SURGERY  01/2016    Ongoing since then    ESOPHAGOSCOPY, GASTROSCOPY, DUODENOSCOPY (EGD), COMBINED  03/28/2014    Procedure: COMBINED ESOPHAGOSCOPY, GASTROSCOPY, DUODENOSCOPY (EGD);  EGD, Gastric Biopsies, Esophageal Banding;  Surgeon: Reyna Tovar MD;  [facility]    ESOPHAGOSCOPY, GASTROSCOPY, DUODENOSCOPY (EGD), COMBINED  06/09/2014    Procedure: COMBINED ESOPHAGOSCOPY,  GASTROSCOPY, DUODENOSCOPY (EGD);  Surgeon: Curtis Mendez MD;  [facility]    ESOPHAGOSCOPY, GASTROSCOPY, DUODENOSCOPY (EGD), COMBINED  07/24/2014    Procedure: COMBINED ESOPHAGOSCOPY, GASTROSCOPY, DUODENOSCOPY (EGD);  Surgeon: Gerard Samaniego MD;  [facility]    ESOPHAGOSCOPY, GASTROSCOPY, DUODENOSCOPY (EGD), COMBINED N/A 10/31/2014    Procedure: COMBINED ESOPHAGOSCOPY, GASTROSCOPY, DUODENOSCOPY (EGD);  Surgeon: Gerard Samaniego MD;  [facility]    ESOPHAGOSCOPY, GASTROSCOPY, DUODENOSCOPY (EGD), COMBINED N/A 05/12/2016    Procedure: COMBINED ESOPHAGOSCOPY, GASTROSCOPY, DUODENOSCOPY (EGD);  Surgeon: Ana Paula Urbina MD;  [facility]    ESOPHAGOSCOPY, GASTROSCOPY, DUODENOSCOPY (EGD), COMBINED N/A 08/02/2018    Procedure: COMBINED ESOPHAGOSCOPY, GASTROSCOPY, DUODENOSCOPY (EGD);  EGD;  Surgeon: Yu Wagner MD;  [facility]    HCL SQUAMOUS CELL CARCINOMA AG  10/2007    left forearm    HERNIORRHAPHY UMBILICAL  11/08/2012    Procedure: HERNIORRHAPHY UMBILICAL;  Open Umbilical Hernia Repair With Mesh ;  Surgeon: Chase Nicholson MD;  [facility]    INSERT STIMULATOR DORSAL COLUMN N/A 06/28/2018    Procedure: INSERT STIMULATOR DORSAL COLUMN;;  Surgeon: Elvis Sauceda MD;  [facility]    IR LOWER EXTREMITY ANGIOGRAM RIGHT  04/23/2024    neuro stimulator  2010    PICC DOUBLE LUMEN PLACEMENT Left 01/16/2025    Lateral brachial, 47 cm, 3 cm external length    RELEASE CARPAL TUNNEL Left 08/31/2023    Procedure: LEFT OPEN CARPAL TUNNEL RELEASE,;  Surgeon: Deny Dixon MD;  Location: [facility]    RELEASE TRIGGER FINGER Left 08/31/2023    Procedure: Release left trigger finger thumb;  Surgeon: Deny Dixon MD;  Location: [facility] OR    REMOVE EXTRACORPORAL MEMBRANE OXYGENATOR ADULT N/A 01/03/2025    Procedure: Remove Left Leg Peripheral Extracorporal Membrane Oxygenator and Closure;  Surgeon: Bernice Rosenbaum MD;  [facility]    REMOVE  GENERATOR STIMULATOR (LOCATION) N/A 2018    Procedure: REMOVE GENERATOR STIMULATOR (LOCATION);  Spinal Cord Stimulator and IPG Explant and Re-Implant of SCS System (Leads and IPG);  Surgeon: Elvis Sauceda MD;  [facility]    REPAIR TENDON ACHILLES Right 2020    Procedure: Right achilles debridement and partial calcaneus excision;  Surgeon: Eh Sandoval MD;  Location: [facility] OR    SURGICAL HISTORY OF -   2002    abcess tooth    SURGICAL HISTORY OF -       L4-5 laminectomy, cauda equina syndrome    SURGICAL HISTORY OF -   +    herniated disk repair    TONSILLECTOMY  10/1964    TRANSPOSITION ULNAR NERVE (ELBOW)      right    ZZC APPENDECTOMY              Social History:     Social History     Tobacco Use    Smoking status: Former     Current packs/day: 0.00     Types: Cigarettes     Start date: 10/13/1983     Quit date: 1984     Years since quittin.6     Passive exposure: Past    Smokeless tobacco: Never   Substance Use Topics    Alcohol use: Not Currently     Comment: - last drink was 3/28/14            Family History:     Family History   Problem Relation Age of Onset    Cancer Mother         lung    Breast Cancer Mother     Other Cancer Mother     Cancer Father         esophogeal, GERD    Snoring Father     Diabetes Brother         amputation, Type 1    Diabetes Brother     Diabetes Brother     Cancer - colorectal No family hx of     Glaucoma No family hx of     Macular Degeneration No family hx of     Anesthesia Reaction No family hx of     Venous thrombosis No family hx of             Allergies:     Allergies   Allergen Reactions    Levaquin Anaphylaxis and Rash     Swelling in lip and tongue felt thick    Lisinopril Anaphylaxis     Swollen tongue; inability to swallow; drooling; hives; swollen face, neck, angioedema    Acetaminophen      Hx of cirrhosis     Amlodipine      Swelling, hives, possible angioedema      Morphine      b/p dropped and arms  went numb  Hypotension    Quinolones Hives    Spironolactone Unknown     Pt believes himself to be allergic to it; cannot find his old records of this[facility] [place]     Bactrim [Sulfamethoxazole-Trimethoprim] Rash            Medications:     Current Facility-Administered Medications   Medication Dose Route Frequency Provider Last Rate Last Admin    acetaminophen (TYLENOL) tablet 650 mg  650 mg Oral Q8H PRN Juni Sanderson PA-C   650 mg at 01/17/25 0828    Or    acetaminophen (TYLENOL) Suppository 650 mg  650 mg Rectal Q8H PRN Juni Sanderson PA-C        acetylcysteine (MUCOMYST) 20 % nebulizer solution 2 mL  2 mL Nebulization Q6H PRN Katt Sprague, NP        albuterol (PROVENTIL) neb solution 2.5 mg  2.5 mg Nebulization Q4H PRN Katt Sprague, NP        albuterol (PROVENTIL) neb solution 2.5 mg  2.5 mg Nebulization Q6H Katt Sprague, NP   2.5 mg at 01/19/25 0756    apixaban ANTICOAGULANT (ELIQUIS) tablet 5 mg  5 mg Oral BID Katt Sprague, NP   5 mg at 01/19/25 0806    ARIPiprazole (ABILIFY) tablet 2 mg  2 mg Oral Daily Antonio Hernandez APRN CNP   2 mg at 01/19/25 0809    aspirin (ASA) chewable tablet 81 mg  81 mg Oral or Feeding Tube Daily Katt Sprague NP   81 mg at 01/19/25 0806    baclofen (LIORESAL) tablet 10 mg  10 mg Oral or Feeding Tube Q8H Jovita Aleman APRN CNP   10 mg at 01/19/25 0807    bisacodyl (DULCOLAX) suppository 10 mg  10 mg Rectal Daily PRN Denny James PA-C        bumetanide (BUMEX) tablet 2 mg  2 mg Oral Daily Antonio Hernandez APRN CNP   2 mg at 01/19/25 0807    calcium carbonate (TUMS) chewable tablet 1,000 mg  1,000 mg Oral BID Vidal Cyr MD   1,000 mg at 01/19/25 0807    dextrose 10% infusion   Intravenous Continuous PRN Sean Negron MD        glucose gel 15-30 g  15-30 g Oral Q15 Min PRN Antonio Hernandez APRN CNP        Or    dextrose 50 % injection 25-50 mL  25-50 mL Intravenous Q15 Min PRN Antonio Hernandez APRN CNP        Or    glucagon  injection 1 mg  1 mg Subcutaneous Q15 Min PRN Antonio Hernandez APRN CNP        fiber modular (BANATROL TF) packet 1 packet  1 packet Per Feeding Tube BID Renata Rey MD   1 packet at 01/19/25 0808    gabapentin (NEURONTIN) capsule 300 mg  300 mg Oral Daily at 4 pm Katt Sprague NP   300 mg at 01/18/25 1613    gabapentin (NEURONTIN) capsule 900 mg  900 mg Oral At Bedtime Katt Sprague NP   900 mg at 01/18/25 2127    heparin lock flush 10 unit/mL injection 5-20 mL  5-20 mL Intracatheter Q24H Katt Sprague NP   5 mL at 01/17/25 1003    heparin lock flush 10 unit/mL injection 5-20 mL  5-20 mL Intracatheter Q1H PRN Katt Sprague NP        insulin aspart (NovoLOG) injection (RAPID ACTING)  1-10 Units Subcutaneous TID AC Antonio Hernandez APRN CNP        insulin aspart (NovoLOG) injection (RAPID ACTING)  1-7 Units Subcutaneous At Bedtime Antonio Hernandez APRN CNP        insulin glargine (LANTUS PEN) injection 20 Units  20 Units Subcutaneous At Bedtime Antonio Hernandez APRN CNP   20 Units at 01/18/25 2155    ipratropium - albuterol 0.5 mg/2.5 mg/3 mL (DUONEB) neb solution 3 mL  3 mL Nebulization Q4H PRN Katt Sprague NP        Lidocaine (LIDOCARE) 4 % Patch 2 patch  2 patch Transdermal Q24h Katt Sprague NP   2 patch at 01/18/25 2130    loperamide (IMODIUM) capsule 2 mg  2 mg Oral 4x Daily PRN Bernice Rosenbaum MD   2 mg at 01/18/25 1434    losartan (COZAAR) tablet 25 mg  25 mg Oral Daily Katt Sprague NP   25 mg at 01/19/25 0805    methocarbamol (ROBAXIN) tablet 500 mg  500 mg Oral TID PRN Katt Sprague NP   500 mg at 01/17/25 1324    metoprolol tartrate (LOPRESSOR) tablet 50 mg  50 mg Oral BID Katt Sprague NP   50 mg at 01/19/25 0806    multivitamin, therapeutic (THERA-VIT) tablet 1 tablet  1 tablet Oral or Feeding Tube Daily Renata Rey MD   1 tablet at 01/19/25 0806    naloxone (NARCAN) injection 0.2 mg  0.2 mg Intravenous Q2 Min Bernice Gautam MD         Or    naloxone (NARCAN) injection 0.4 mg  0.4 mg Intravenous Q2 Min PRN Bernice Rosenbaum MD        Or    naloxone (NARCAN) injection 0.2 mg  0.2 mg Intramuscular Q2 Min PRN Bernice Rosenbaum MD        Or    naloxone (NARCAN) injection 0.4 mg  0.4 mg Intramuscular Q2 Min PRN Bernice Rosenbaum MD        OLANZapine (zyPREXA) injection 2.5 mg  2.5 mg Intramuscular Q12H PRN Katt Sprague NP   2.5 mg at 01/15/25 1112    Or    OLANZapine zydis (zyPREXA) ODT tab 5 mg  5 mg Oral Q12H PRN Katt Sprague NP        ondansetron (ZOFRAN ODT) ODT tab 4 mg  4 mg Oral Q6H PRN Sonia Fajardo PA-C   4 mg at 01/18/25 1844    Or    ondansetron (ZOFRAN) injection 4 mg  4 mg Intravenous Q6H PRN Sonia Fajardo PA-C        oxyCODONE (ROXICODONE) tablet 5 mg  5 mg Oral Q6H PRN Katt Sprague NP   5 mg at 01/18/25 1844    Or    oxyCODONE IR (ROXICODONE) tablet 10 mg  10 mg Oral Q6H PRN Katt Sprague NP   10 mg at 01/18/25 1001    piperacillin-tazobactam (ZOSYN) intermittent infusion 3.375 g  3.375 g Intravenous Q6H Bernice Rosenbaum  mL/hr at 01/19/25 0906 3.375 g at 01/19/25 0906    potassium chloride alia ER (KLOR-CON M20) CR tablet 60 mEq  60 mEq Oral Daily Antonio Hernandez APRN CNP   60 mEq at 01/19/25 0907    Prosource TF20 ENfit Compatibl EN LIQD (PROSOURCE TF20) packet 60 mL  1 packet Per Feeding Tube Daily Renata Rey MD   60 mL at 01/19/25 0808    ramelteon (ROZEREM) tablet 8 mg  8 mg Oral At Bedtime Katt Sprague NP   8 mg at 01/18/25 2128    rosuvastatin (CRESTOR) tablet 40 mg  40 mg Oral Daily Jovita Aleman, VANCE CNP   40 mg at 01/19/25 0808    senna-docusate (SENOKOT-S/PERICOLACE) 8.6-50 MG per tablet 1 tablet  1 tablet Oral or Feeding Tube BID PRN Denny James PA-C        sertraline (ZOLOFT) tablet 200 mg  200 mg Oral Daily Sean Negron MD   200 mg at 01/19/25 0806    sodium chloride (NEBUSAL) 3 % neb solution 3 mL  3 mL Nebulization TID Melanie Ramirez, RT    3 mL at 01/19/25 0759    sodium chloride (PF) 0.9% PF flush 10-20 mL  10-20 mL Intracatheter q1 min prn Katt Sprague, NP        sodium chloride (PF) 0.9% PF flush 10-20 mL  10-20 mL Intracatheter q1 min prn Katt Sprague M, NP        sodium chloride (PF) 0.9% PF flush 10-40 mL  10-40 mL Intracatheter Q8H Katt Sprague, NP        sodium chloride (PF) 0.9% PF flush 10-40 mL  10-40 mL Intracatheter Q8H CelestineKatt tee, NP   10 mL at 01/18/25 1005    sodium chloride (PF) 0.9% PF flush 3 mL  3 mL Intracatheter Q8H Sonia Fajardo PA-C   3 mL at 01/19/25 0808    sodium chloride (PF) 0.9% PF flush 3 mL  3 mL Intracatheter q1 min prn Sonia Fajardo PA-C        tamsulosin (FLOMAX) capsule 0.4 mg  0.4 mg Oral Daily Antonio Hernandez APRN CNP   0.4 mg at 01/19/25 0806    vancomycin (VANCOCIN) 1,250 mg in 0.9% NaCl 250 mL intermittent infusion  1,250 mg Intravenous Q12H Antonio Hernandez APRN .7 mL/hr at 01/19/25 0003 1,250 mg at 01/19/25 0003             Review of Systems:    ROS: 10 point ROS neg other than the symptoms noted above in the HPI           Physical Exam:   VS:  T: 97.6    HR: 79    BP: 129/65    RR: 18   GEN:  AOx3.  NAD.    CV:  RRR  LUNGS: Non-labored breathing.   BACK:  No midline or CVA tenderness.  ABD:  Soft.  NT.  ND.  No rebound or guarding.  No masses.  :  ca in place, draining clear yellow  EXT:  Warm, well perfused.  no edema.  SKIN:  Warm.  Dry.  No rashes.  NEURO:  CN grossly intact.            Data:   All laboratory data reviewed:    Recent Labs   Lab 01/19/25  0437 01/18/25  0506 01/17/25  0457 01/16/25  0434   WBC 6.4 12.3* 16.2* 16.8*   HGB 8.4* 8.9* 8.8* 9.5*    470* 510* 569*       Recent Labs   Lab 01/19/25  0836 01/19/25  0812 01/19/25  0437 01/18/25  2150 01/18/25  2144 01/18/25  1306 01/18/25  1211 01/18/25  0509 01/18/25  0506 01/17/25  0826 01/17/25  0457 01/16/25  0903 01/16/25  0732 01/14/25  0745 01/14/25  0622 01/13/25  0836 01/13/25  0425  01/13/25  0001 01/12/25  2145   NA  --   --  141  --   --   --   --   --  140  --  138  --  139   < > 141   < > 142  --  143   POTASSIUM  --   --  3.0*  --  3.1*  --  3.4  --  3.3*  --  4.1  --  4.1   < > 3.3*   < > 3.0*  --  3.1*  3.1*   CHLORIDE  --   --  106  --   --   --   --   --  106  --  107  --  108*   < > 108*   < > 108*  --  110*   CO2  --   --  27  --   --   --   --   --  24  --  22  --  21*   < > 21*   < > 23  --  23   BUN  --   --  16.2  --   --   --   --   --  22.0  --  28.4*  --  27.3*   < > 19.2   < > 19.5  --  21.2   CR  --   --  0.65*  --   --   --   --   --  0.66*  --  0.66*  --  0.58*   < > 0.45*   < > 0.49*  --  0.57*   * 116* 115* 105*  --    < >  --    < > 111*   < > 168*   < > 230*   < > 166*   < > 179*   < > 154*   [place]  --   --  8.2*  --   --   --   --   --  8.7*  --  8.3*  --  8.6*   < > 8.5*   < > 8.0*  --  7.9*   MAG  --   --  1.8  --   --   --   --   --  2.3  --  2.1  --  2.2   < > 1.7  --  2.1  --  2.0   PHOS  --   --   --   --   --   --   --   --  4.1  --   --   --   --   --  2.7  --  2.9  --  3.3    < > = values in this interval not displayed.       Recent Labs   Lab 01/14/25  1614   COLOR Light Yellow   APPEARANCE Clear   URINEGLC Negative   URINEBILI Negative   URINEKETONE Negative   SG 1.015   URINEPH 7.0   PROTEIN Negative   NITRITE Negative   LEUKEST Negative       All pertinent imaging reviewed:  none           Impression and Plan:   Impression:  Erwin Aguilar is a 64 year old male with urinary retention. PMH of recent ICU stay for cardiac issues after CABG as well as cauda equina after complicated spinal surgery and no previous urologic history    Plan:  - Avoid anticholinergic, antihistamine, and alpha-agonist medications as these will exacerbate urinary retention  - Minimize narcotic pain medication regimen as tolerated  - Send UA/UC to rule out UTI as etiology of urinary retention  - Increased ambulation/mobility will also usually help with improvement in the degree  of urinary retention  - Start tamsulosin 0.4 mg qday    - Patient can either be managed with a indwelling ca catheter vs clean intermittent catheterization until seen in f/u in urology clinic  - If CIC is chosen, then the patient will need nursing instruction on proper self-intermittent catheterization technique.  - Remind patient that CIC should be performed ONLY after an attempt at spontaneous voiding is made  - Frequency of SIC can be determined based on the average post-void residual:  If PVR < 150cc - no cathing needed  If PVR is 150-250cc - cath bid (qam & qhs)  If PVR is 251-350cc - cath tid  If PVR is 351-450cc - cath qid  If PVR is > 450cc - cath every 4 hours  - The patient will need to keep a journal of his cathing regimen after discharge (will need to include frequency of cathing and post-void residual amount obtained from CIC) and bring this to clinic for review   - If patient unwilling or unable to perform CIC, may leave ca catheter in place    - Patient will need to f/u in urology clinic approximately 1 week after discharge for a trial of void and discussion of bladder emptying issues        This patient's exam findings, labs, and imaging discussed with urology staff surgeon who developed the treatment plan.    Joseluis Ross MD  PGY-3 Urology Resident

## 2025-01-19 NOTE — PLAN OF CARE
Problem: Adult Inpatient Plan of Care  Goal: Absence of Hospital-Acquired Illness or Injury  Intervention: Prevent Infection  Recent Flowsheet Documentation  Taken 1/19/2025 1200 by Kendrick Mcgowan, RN  Infection Prevention:   environmental surveillance performed   hand hygiene promoted   personal protective equipment utilized   rest/sleep promoted   single patient room provided  Taken 1/19/2025 0800 by Kendrick Mcgowan, RN  Infection Prevention:   environmental surveillance performed   hand hygiene promoted   personal protective equipment utilized   rest/sleep promoted   single patient room provided   Goal Outcome Evaluation:      Plan of Care Reviewed With: patient    Overall Patient Progress: no changeOverall Patient Progress: no change    Outcome Evaluation: rude to staff, frustrated or angry/argumenative with any detail of care, staff assisted off unit in WC, FC in place, IV ATBs cont. up in WC with lift, denies pain

## 2025-01-19 NOTE — PROGRESS NOTES
"Full          Erwin Aguilar          CVTS                   12/31    65yo    Dx: VT cardiac arrest, STEMI, CABG x4    Hx: DM2, HTN, PVD, and neuropathic pain who initially presented on 12/31/24 to the ED with arm pain, vomiting, and diarrhea; ACS and ST depression       NEURO: A&O x4/argry/argumentative with all tasks                 R BKA T&R (chronic back issues)                 Rude to staff                 P.T. to see tmrw                 Up x2 with lift--refused recliner--in WC                 Joel pain oxy/robaxin/abilify                 Staff took via WC off unit    RESPIRATORY: Course                             RA (keep >92)                                                      Chest percussion/nebs/IS    CARDIAC: SR  EF 60-65                     Declines lymph wraps    GI/: incontinent--imodium given              Rodriguez (uro consult)              Reg diet               ACCU cks    SKIN: sternal incision frances    LDAs: L PIV             L DL PICC (zosyn/vanc)    PLAN:   Will push next K level back due to patient still not taking po k supplements yet \"I will, I'm working on it\"---level at 1730 low 3.1---will try packet for replacememt    Provider requested to come to bedside at patient \"demand\".  "

## 2025-01-20 ENCOUNTER — APPOINTMENT (OUTPATIENT)
Dept: PHYSICAL THERAPY | Facility: CLINIC | Age: 65
DRG: 003 | End: 2025-01-20
Payer: MEDICARE

## 2025-01-20 ENCOUNTER — HOSPITAL ENCOUNTER (INPATIENT)
Facility: SKILLED NURSING FACILITY | Age: 65
End: 2025-01-20
Payer: MEDICARE

## 2025-01-20 ENCOUNTER — APPOINTMENT (OUTPATIENT)
Dept: CARDIOLOGY | Facility: CLINIC | Age: 65
DRG: 003 | End: 2025-01-20
Attending: NURSE PRACTITIONER
Payer: MEDICARE

## 2025-01-20 LAB
ANION GAP SERPL CALCULATED.3IONS-SCNC: 8 MMOL/L (ref 7–15)
ATRIAL RATE - MUSE: 81 BPM
BUN SERPL-MCNC: 9.8 MG/DL (ref 8–23)
CALCIUM SERPL-MCNC: 8.6 MG/DL (ref 8.8–10.4)
CHLORIDE SERPL-SCNC: 106 MMOL/L (ref 98–107)
CREAT SERPL-MCNC: 0.56 MG/DL (ref 0.67–1.17)
DIASTOLIC BLOOD PRESSURE - MUSE: NORMAL MMHG
EGFRCR SERPLBLD CKD-EPI 2021: >90 ML/MIN/1.73M2
ERYTHROCYTE [DISTWIDTH] IN BLOOD BY AUTOMATED COUNT: 16.9 % (ref 10–15)
GLUCOSE BLDC GLUCOMTR-MCNC: 132 MG/DL (ref 70–99)
GLUCOSE BLDC GLUCOMTR-MCNC: 146 MG/DL (ref 70–99)
GLUCOSE BLDC GLUCOMTR-MCNC: 156 MG/DL (ref 70–99)
GLUCOSE SERPL-MCNC: 107 MG/DL (ref 70–99)
HCO3 SERPL-SCNC: 24 MMOL/L (ref 22–29)
HCT VFR BLD AUTO: 28.1 % (ref 40–53)
HGB BLD-MCNC: 8.7 G/DL (ref 13.3–17.7)
INTERPRETATION ECG - MUSE: NORMAL
LVEF ECHO: NORMAL
MAGNESIUM SERPL-MCNC: 1.9 MG/DL (ref 1.7–2.3)
MCH RBC QN AUTO: 29.8 PG (ref 26.5–33)
MCHC RBC AUTO-ENTMCNC: 31 G/DL (ref 31.5–36.5)
MCV RBC AUTO: 96 FL (ref 78–100)
P AXIS - MUSE: 31 DEGREES
PHOSPHATE SERPL-MCNC: 2.5 MG/DL (ref 2.5–4.5)
PLATELET # BLD AUTO: 275 10E3/UL (ref 150–450)
POTASSIUM SERPL-SCNC: 3.7 MMOL/L (ref 3.4–5.3)
PR INTERVAL - MUSE: 142 MS
QRS DURATION - MUSE: 78 MS
QT - MUSE: 424 MS
QTC - MUSE: 492 MS
R AXIS - MUSE: 8 DEGREES
RBC # BLD AUTO: 2.92 10E6/UL (ref 4.4–5.9)
SODIUM SERPL-SCNC: 138 MMOL/L (ref 135–145)
SYSTOLIC BLOOD PRESSURE - MUSE: NORMAL MMHG
T AXIS - MUSE: 147 DEGREES
VENTRICULAR RATE- MUSE: 81 BPM
WBC # BLD AUTO: 6.8 10E3/UL (ref 4–11)

## 2025-01-20 PROCEDURE — 94640 AIRWAY INHALATION TREATMENT: CPT | Mod: 76

## 2025-01-20 PROCEDURE — 250N000013 HC RX MED GY IP 250 OP 250 PS 637: Performed by: NURSE PRACTITIONER

## 2025-01-20 PROCEDURE — 250N000013 HC RX MED GY IP 250 OP 250 PS 637: Performed by: STUDENT IN AN ORGANIZED HEALTH CARE EDUCATION/TRAINING PROGRAM

## 2025-01-20 PROCEDURE — 250N000013 HC RX MED GY IP 250 OP 250 PS 637

## 2025-01-20 PROCEDURE — 93325 DOPPLER ECHO COLOR FLOW MAPG: CPT

## 2025-01-20 PROCEDURE — 250N000009 HC RX 250: Performed by: SURGERY

## 2025-01-20 PROCEDURE — 94640 AIRWAY INHALATION TREATMENT: CPT

## 2025-01-20 PROCEDURE — 250N000011 HC RX IP 250 OP 636: Performed by: STUDENT IN AN ORGANIZED HEALTH CARE EDUCATION/TRAINING PROGRAM

## 2025-01-20 PROCEDURE — 85027 COMPLETE CBC AUTOMATED: CPT

## 2025-01-20 PROCEDURE — 83735 ASSAY OF MAGNESIUM: CPT | Performed by: STUDENT IN AN ORGANIZED HEALTH CARE EDUCATION/TRAINING PROGRAM

## 2025-01-20 PROCEDURE — 250N000009 HC RX 250

## 2025-01-20 PROCEDURE — 97110 THERAPEUTIC EXERCISES: CPT | Mod: GP | Performed by: PHYSICAL THERAPIST

## 2025-01-20 PROCEDURE — 82435 ASSAY OF BLOOD CHLORIDE: CPT

## 2025-01-20 PROCEDURE — 97530 THERAPEUTIC ACTIVITIES: CPT | Mod: GP | Performed by: PHYSICAL THERAPIST

## 2025-01-20 PROCEDURE — 250N000011 HC RX IP 250 OP 636: Performed by: SURGERY

## 2025-01-20 PROCEDURE — 250N000013 HC RX MED GY IP 250 OP 250 PS 637: Performed by: SURGERY

## 2025-01-20 PROCEDURE — 93308 TTE F-UP OR LMTD: CPT | Mod: 26 | Performed by: INTERNAL MEDICINE

## 2025-01-20 PROCEDURE — 120N000003 HC R&B IMCU UMMC

## 2025-01-20 PROCEDURE — 93325 DOPPLER ECHO COLOR FLOW MAPG: CPT | Mod: 26 | Performed by: INTERNAL MEDICINE

## 2025-01-20 PROCEDURE — 94668 MNPJ CHEST WALL SBSQ: CPT

## 2025-01-20 PROCEDURE — 97140 MANUAL THERAPY 1/> REGIONS: CPT | Mod: GP | Performed by: PHYSICAL THERAPIST

## 2025-01-20 PROCEDURE — 250N000011 HC RX IP 250 OP 636: Performed by: NURSE PRACTITIONER

## 2025-01-20 PROCEDURE — 84100 ASSAY OF PHOSPHORUS: CPT | Performed by: STUDENT IN AN ORGANIZED HEALTH CARE EDUCATION/TRAINING PROGRAM

## 2025-01-20 PROCEDURE — 93321 DOPPLER ECHO F-UP/LMTD STD: CPT | Mod: 26 | Performed by: INTERNAL MEDICINE

## 2025-01-20 PROCEDURE — 999N000007 HC SITE CHECK

## 2025-01-20 PROCEDURE — 250N000011 HC RX IP 250 OP 636: Performed by: PHYSICIAN ASSISTANT

## 2025-01-20 PROCEDURE — 999N000157 HC STATISTIC RCP TIME EA 10 MIN

## 2025-01-20 PROCEDURE — 80048 BASIC METABOLIC PNL TOTAL CA: CPT

## 2025-01-20 RX ORDER — MAGNESIUM SULFATE HEPTAHYDRATE 40 MG/ML
2 INJECTION, SOLUTION INTRAVENOUS ONCE
Status: COMPLETED | OUTPATIENT
Start: 2025-01-20 | End: 2025-01-20

## 2025-01-20 RX ORDER — POTASSIUM CHLORIDE 1.5 G/1.58G
20 POWDER, FOR SOLUTION ORAL ONCE
Status: COMPLETED | OUTPATIENT
Start: 2025-01-20 | End: 2025-01-20

## 2025-01-20 RX ORDER — POTASSIUM CHLORIDE 1.5 G/1.58G
60 POWDER, FOR SOLUTION ORAL DAILY
Status: DISCONTINUED | OUTPATIENT
Start: 2025-01-21 | End: 2025-01-22

## 2025-01-20 RX ORDER — POTASSIUM CHLORIDE 7.45 MG/ML
10 INJECTION INTRAVENOUS
Status: COMPLETED | OUTPATIENT
Start: 2025-01-20 | End: 2025-01-20

## 2025-01-20 RX ADMIN — ARIPIPRAZOLE 2 MG: 2 TABLET ORAL at 09:25

## 2025-01-20 RX ADMIN — Medication 1250 MG: at 12:13

## 2025-01-20 RX ADMIN — BACLOFEN 10 MG: 10 TABLET ORAL at 23:01

## 2025-01-20 RX ADMIN — THERA TABS 1 TABLET: TAB at 09:24

## 2025-01-20 RX ADMIN — LOPERAMIDE HYDROCHLORIDE 2 MG: 2 CAPSULE ORAL at 00:06

## 2025-01-20 RX ADMIN — ROSUVASTATIN CALCIUM 40 MG: 20 TABLET, FILM COATED ORAL at 09:25

## 2025-01-20 RX ADMIN — Medication 5 ML: at 17:52

## 2025-01-20 RX ADMIN — LOPERAMIDE HYDROCHLORIDE 2 MG: 2 CAPSULE ORAL at 13:43

## 2025-01-20 RX ADMIN — LIDOCAINE 4% 2 PATCH: 40 PATCH TOPICAL at 20:38

## 2025-01-20 RX ADMIN — RAMELTEON 8 MG: 8 TABLET ORAL at 20:41

## 2025-01-20 RX ADMIN — GABAPENTIN 300 MG: 300 CAPSULE ORAL at 16:22

## 2025-01-20 RX ADMIN — ALBUTEROL SULFATE 2.5 MG: 2.5 SOLUTION RESPIRATORY (INHALATION) at 04:16

## 2025-01-20 RX ADMIN — PIPERACILLIN SODIUM AND TAZOBACTAM SODIUM 3.38 G: 3; .375 INJECTION, SOLUTION INTRAVENOUS at 11:09

## 2025-01-20 RX ADMIN — BUMETANIDE 2 MG: 2 TABLET ORAL at 09:23

## 2025-01-20 RX ADMIN — SODIUM CHLORIDE SOLN NEBU 3% 3 ML: 3 NEBU SOLN at 14:41

## 2025-01-20 RX ADMIN — POTASSIUM CHLORIDE 10 MEQ: 7.46 INJECTION, SOLUTION INTRAVENOUS at 09:27

## 2025-01-20 RX ADMIN — SODIUM CHLORIDE SOLN NEBU 3% 3 ML: 3 NEBU SOLN at 21:02

## 2025-01-20 RX ADMIN — POTASSIUM CHLORIDE 10 MEQ: 7.46 INJECTION, SOLUTION INTRAVENOUS at 11:10

## 2025-01-20 RX ADMIN — SERTRALINE HYDROCHLORIDE 200 MG: 100 TABLET ORAL at 09:24

## 2025-01-20 RX ADMIN — TAMSULOSIN HYDROCHLORIDE 0.4 MG: 0.4 CAPSULE ORAL at 09:23

## 2025-01-20 RX ADMIN — METOPROLOL TARTRATE 50 MG: 25 TABLET, FILM COATED ORAL at 20:37

## 2025-01-20 RX ADMIN — APIXABAN 5 MG: 5 TABLET, FILM COATED ORAL at 09:23

## 2025-01-20 RX ADMIN — ONDANSETRON 4 MG: 4 TABLET, ORALLY DISINTEGRATING ORAL at 00:05

## 2025-01-20 RX ADMIN — PIPERACILLIN SODIUM AND TAZOBACTAM SODIUM 3.38 G: 3; .375 INJECTION, SOLUTION INTRAVENOUS at 04:51

## 2025-01-20 RX ADMIN — POTASSIUM & SODIUM PHOSPHATES POWDER PACK 280-160-250 MG 1 PACKET: 280-160-250 PACK at 15:21

## 2025-01-20 RX ADMIN — MAGNESIUM SULFATE HEPTAHYDRATE 2 G: 40 INJECTION, SOLUTION INTRAVENOUS at 09:26

## 2025-01-20 RX ADMIN — GABAPENTIN 900 MG: 300 CAPSULE ORAL at 20:40

## 2025-01-20 RX ADMIN — SODIUM CHLORIDE SOLN NEBU 3% 3 ML: 3 NEBU SOLN at 09:09

## 2025-01-20 RX ADMIN — ASPIRIN 81 MG CHEWABLE TABLET 81 MG: 81 TABLET CHEWABLE at 09:24

## 2025-01-20 RX ADMIN — Medication 5 ML: at 12:41

## 2025-01-20 RX ADMIN — BACLOFEN 10 MG: 10 TABLET ORAL at 16:22

## 2025-01-20 RX ADMIN — METOPROLOL TARTRATE 50 MG: 25 TABLET, FILM COATED ORAL at 09:24

## 2025-01-20 RX ADMIN — CALCIUM CARBONATE (ANTACID) CHEW TAB 500 MG 1000 MG: 500 CHEW TAB at 16:23

## 2025-01-20 RX ADMIN — ALBUTEROL SULFATE 2.5 MG: 2.5 SOLUTION RESPIRATORY (INHALATION) at 09:09

## 2025-01-20 RX ADMIN — LOSARTAN POTASSIUM 25 MG: 25 TABLET, FILM COATED ORAL at 09:24

## 2025-01-20 RX ADMIN — POTASSIUM CHLORIDE 20 MEQ: 1.5 POWDER, FOR SOLUTION ORAL at 15:25

## 2025-01-20 RX ADMIN — CALCIUM CARBONATE (ANTACID) CHEW TAB 500 MG 1000 MG: 500 CHEW TAB at 09:25

## 2025-01-20 RX ADMIN — BACLOFEN 10 MG: 10 TABLET ORAL at 09:24

## 2025-01-20 ASSESSMENT — ACTIVITIES OF DAILY LIVING (ADL)
ADLS_ACUITY_SCORE: 59
ADLS_ACUITY_SCORE: 57
ADLS_ACUITY_SCORE: 59
ADLS_ACUITY_SCORE: 57
ADLS_ACUITY_SCORE: 57
ADLS_ACUITY_SCORE: 55
ADLS_ACUITY_SCORE: 59
ADLS_ACUITY_SCORE: 59
ADLS_ACUITY_SCORE: 57
ADLS_ACUITY_SCORE: 59
ADLS_ACUITY_SCORE: 59
ADLS_ACUITY_SCORE: 57
ADLS_ACUITY_SCORE: 55
ADLS_ACUITY_SCORE: 59
ADLS_ACUITY_SCORE: 57

## 2025-01-20 NOTE — CONSULTS
"      [facility] Consult Service Note  Interventional Radiology  01/20/25   11:47 AM    Consult Requested: \"drainage and drain placement for large retrosternal fluid collection along the right heart border, measuring 10.5 x 7.0 x 12.5 cm and exerting mass effect on the right heart.\"    Recommendations/Plan:    IR recommends CVTS or interventional cardiology to consider drain placement in this patient given the concern for the proximity to the heart and concern for this being intrapericardial. IR would not be the service to place a drain within the pericardium.    Case and imaging discussed with IR attending Dr. Elvis Stuart MD   Recommendations were reviewed with Antonio Hernandez, CNP    ADDENDUM: 1/20/25 0561  Dr. Rosenbaum called to discuss further with Dr. Stuart. Concerns that this is not intrapericardial given the patient just recently had surgical intervention, so they feel that this fluid collection is mediastinal. Interventional cardiology does not have an appropriate window via echocardiogram. Request is for IR to place a drain in this retrosternal fluid collection. Preference would be for drain to be 12Fr. Rationale for drain is due to unexplained leukocytosis, and concerns for infection. Discussed case with Dr. Osorio who approves IR drainage of mediastinal fluid collection. However, patient needs to be off of apixaban x 4 doses. Patient ok to continue on ASA. Discussed case with Dr. Rosenbaum from CVTS. We would likely pursue drainage on 1/22/25. Patient would need to be followed closely for his drain.  is not here on Wednesday 1/22/25, so we will need to confirm other staff physician willing to pursue under CT guidance.     Fabiana Saha PA-C        History of Present Illness:  Erwin Aguilar is a 64 year old male with a past medical history of DM2, HTN, PVD, and neuropathic pain who initially presented on 12/31/24 to the ED with arm pain, vomiting, and diarrhea; ACS and ST depression. " He subsequently had VT arrest with 1 round of CPR, epinephrine, defibrillation. After ROSC was found to be in afib with RVR. Became hypotensive and was subsequently cannulated for VA ECMO. 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 Rosenbaum. ECMO decannulated on 1/3/25 at bedside. ICU course complicated by large R PTX seen on chest CT on 25 s/p bedside CT placement, VAP, and encephalopathy. Extubated 25. Request is for drain placement in large retrosternal fluid collection along the right heart border (10.5 x7x12.5cm) that is exerting mass effect on the right heart. ECHO as noted below also demonstrates large loculated pericardial effusion.     Pertinent Imaging Reviewed:   CT CAP 25  Recent Results (from the past 24 hours)   Echocardiogram Limited   Result Value    LVEF  55-60%    Narrative    [id number]  [id number]  [id number]  [id number]^VJ^DELPHINE^ARLIN     [facility],[place]  Echocardiography Laboratory  [address] [place] [id number]     Name: SUSAN CHENG  MRN: [id number]  : 1960  Study Date: 2025 08:55 AM  Age: 64 yrs  Gender: Male  Patient Location: [facility]  Reason For Study: Pericardial Effusion  Ordering Physician: DELPHINE HERNANDEZ  Performed By: Milton Levin RDCS     BSA: 2.0 m2  Height: 71 in  Weight: 180 lb  HR: 75  BP: 139/77 mmHg  ______________________________________________________________________________  Procedure  Limited Echocardiogram with two-dimensional, color and spectral Doppler. The  final echo results were communicated to Aaron Hernandez.  ______________________________________________________________________________  Interpretation Summary  Large, loculated pericardial effusion anterior to the right ventricle without  hemodynamic compromise. Slightly larger than observed on the 25 study.  ______________________________________________________________________________  Left Ventricle  Left ventricular  "function is normal.The ejection fraction is 55-60%.     Right Ventricle  The right ventricle is normal size. Global right ventricular function is  normal.     Mitral Valve  The mitral valve is normal.     Tricuspid Valve  The tricuspid valve is normal.     Vessels  IVC diameter and respiratory changes fall into an intermediate range  suggesting an RA pressure of 8 mmHg.     Pericardium  Large, loculated pericardial effusion anterior to the right ventricle without  hemodynamic compromise. Slightly larger than observed on the 1/17/25 study.     Miscellaneous  A left pleural effusion is present.  ______________________________________________________________________________  Report approved by: Alessio Marte MD on 01/20/2025 09:41 AM     ______________________________________________________________________________          Expected date of discharge:  01/20/2025    Vitals:   /64 (BP Location: Right arm)   Pulse 65   Temp 97.5  F (36.4  C) (Oral)   Resp 19   Ht 1.803 m (5' 11\")   Wt 82 kg (180 lb 12.4 oz)   SpO2 94%   BMI 25.21 kg/m      Pertinent Labs Reviewed:  CBC:  Lab Results   Component Value Date    WBC 6.8 01/20/2025    WBC 6.4 01/19/2025    WBC 12.3 (H) 01/18/2025    WBC 6.5 04/09/2021    WBC 10.6 11/23/2020    WBC 7.2 11/09/2020     Lab Results   Component Value Date    HGB 8.7 01/20/2025    HGB 8.4 01/19/2025    HGB 8.9 01/18/2025    HGB 14.1 04/09/2021    HGB 14.3 11/23/2020    HGB 13.5 11/09/2020     Lab Results   Component Value Date     01/20/2025     01/19/2025     01/18/2025     04/09/2021     11/23/2020     11/09/2020    INR:  Lab Results   Component Value Date    INR 1.29 (H) 01/03/2025    INR 1.01 04/09/2021    PTT 34 01/03/2025    PTT 36 04/22/2014          COVID Results:  COVID-19 Antibody Results, Testing for Immunity           No data to display              COVID-19 PCR Results           No data to display             Potassium   Date " Value Ref Range Status   01/20/2025 3.7 3.4 - 5.3 mmol/L Final   07/15/2022 3.7 3.4 - 5.3 mmol/L Final   04/09/2021 3.9 3.4 - 5.3 mmol/L Final     Potassium POCT   Date Value Ref Range Status   01/02/2025 4.2 3.4 - 5.3 mmol/L Final     Comment:     CRITICAL VALUES NOTED BY PERFUSION AND MD Fabiana Saha PA-C  Interventional Radiology  Pager: [phone]

## 2025-01-20 NOTE — PLAN OF CARE
"Goal Outcome Evaluation:  Shift 1900 - 0730    Plan of Care Reviewed With: patient    Overall Patient Progress: no change    Outcome Evaluation: FC in place, draining adequate urine. Pt complains of soreness around FC insertion site, oxy administered. Calm and cooperative, slept well overnight. Potassium replaced per order sets.    Primary team: CVTS     NEURO: A&O x4. Pt is calm and cooperative. Sensation absent in BLE per pt baseline.   RESPIRATORY: Course LS throughout. CHAVEZ, 1L NC overnight to keep sats > 90%. Frequent, congested cough.   CARDIAC: SR, VSS. Pt denies chest pain and dizziness.  GI/: 4 small, loose incontinent BMs this shift. PRN imodium given, see MAR. Rodriguez cath in place and draining adequate urine output.   SKIN: No new deficits noted. See PCS for assessment and treatment of wounds and surgical incisions.   PAIN: Pt reports pain 5-7/10 related to his catheter site. PRN oxy administered with pain improvement, see MAR.   MOBILITY: Ax2 for repositioning, with lift for transfer. R BKA.   DIET: Regular adult diet, poor appetite.   LDAs: L PIV SL. L DL PICC, purple lumen TKO for abx and red lumen SL.      EVENTS:  - Red/orange cloudy urine noted at 06:40 AM, provider notified. No orders obtained at this time.     PLAN: Continue with POC. Notify primary care team with any concerns/updates.     Intake/Output Summary (Last 24 hours) at 1/20/2025 0654  Last data filed at 1/20/2025 0640  Gross per 24 hour   Intake 1025 ml   Output 4200 ml   Net -3175 ml     /70 (BP Location: Right arm)   Pulse 70   Temp 96.9  F (36.1  C) (Axillary)   Resp 18   Ht 1.803 m (5' 11\")   Wt 82 kg (180 lb 12.4 oz)   SpO2 96%   BMI 25.21 kg/m       Problem: Adult Inpatient Plan of Care  Goal: Plan of Care Review  Description: The Plan of Care Review/Shift note should be completed every shift.  The Outcome Evaluation is a brief statement about your assessment that the patient is improving, declining, or no change.  " "This information will be displayed automatically on your shift  note.  Outcome: Progressing  Flowsheets (Taken 1/20/2025 0237)  Outcome Evaluation: FC in place, draining adequate urine. Pt complains of soreness around FC insertion site, oxy administered. Calm and cooperative, slept well overnight. Potassium replaced per order sets.  Plan of Care Reviewed With: patient  Overall Patient Progress: no change  Goal: Patient-Specific Goal (Individualized)  Description: You can add care plan individualizations to a care plan. Examples of Individualization might be:  \"Parent requests to be called daily at 9am for status\", \"I have a hard time hearing out of my right ear\", or \"Do not touch me to wake me up as it startles  me\".  Outcome: Progressing  Goal: Absence of Hospital-Acquired Illness or Injury  Outcome: Progressing  Intervention: Identify and Manage Fall Risk  Recent Flowsheet Documentation  Taken 1/19/2025 1941 by Onelia Peña RN  Safety Promotion/Fall Prevention:   activity supervised   assistive device/personal items within reach   clutter free environment maintained   increased rounding and observation   increase visualization of patient   lighting adjusted   mobility aid in reach   nonskid shoes/slippers when out of bed   patient and family education   room near nurse's station   room organization consistent   safety round/check completed  Intervention: Prevent Skin Injury  Recent Flowsheet Documentation  Taken 1/19/2025 2350 by Onelia Peña RN  Body Position:   supine, head elevated   weight shifting  Taken 1/19/2025 1941 by Onelia Peña, RN  Body Position:   supine, head elevated   weight shifting  Skin Protection:   adhesive use limited   incontinence pads utilized   tubing/devices free from skin contact  Intervention: Prevent and Manage VTE (Venous Thromboembolism) Risk  Recent Flowsheet Documentation  Taken 1/19/2025 1941 by Onelia Peña RN  VTE Prevention/Management: SCDs off (sequential " compression devices)  Intervention: Prevent Infection  Recent Flowsheet Documentation  Taken 1/19/2025 1941 by Onelia Peña RN  Infection Prevention:   environmental surveillance performed   hand hygiene promoted   personal protective equipment utilized   rest/sleep promoted   single patient room provided  Goal: Optimal Comfort and Wellbeing  Outcome: Progressing  Intervention: Monitor Pain and Promote Comfort  Recent Flowsheet Documentation  Taken 1/19/2025 2350 by Onelia Peña RN  Pain Management Interventions:   emotional support   environmental changes   pillow support provided   relaxation techniques promoted   repositioned   rest  Taken 1/19/2025 1941 by Onelia Peña RN  Pain Management Interventions:   medication (see MAR)   emotional support   environmental changes   pillow support provided   relaxation techniques promoted   repositioned   rest  Intervention: Provide Person-Centered Care  Recent Flowsheet Documentation  Taken 1/19/2025 1941 by Onelia Peña RN  Trust Relationship/Rapport:   care explained   choices provided   empathic listening provided   questions answered   questions encouraged   reassurance provided   thoughts/feelings acknowledged  Goal: Readiness for Transition of Care  Outcome: Progressing     Problem: Comorbidity Management  Goal: Blood Glucose Levels Within Targeted Range  Outcome: Progressing  Intervention: Monitor and Manage Glycemia  Recent Flowsheet Documentation  Taken 1/19/2025 1941 by Onelia Peña RN  Medication Review/Management:   medications reviewed   high-risk medications identified  Goal: Blood Pressure in Desired Range  Outcome: Progressing  Intervention: Maintain Blood Pressure Management  Recent Flowsheet Documentation  Taken 1/19/2025 1941 by Onelia Peña RN  Medication Review/Management:   medications reviewed   high-risk medications identified     Problem: Risk for Delirium  Goal: Optimal Coping  Outcome: Progressing  Intervention: Optimize  Psychosocial Adjustment to Delirium  Recent Flowsheet Documentation  Taken 1/19/2025 1941 by Onelia Peña RN  Supportive Measures:   active listening utilized   decision-making supported   relaxation techniques promoted   self-care encouraged   verbalization of feelings encouraged  Family/Support System Care:   involvement promoted   presence promoted   self-care encouraged  Goal: Improved Behavioral Control  Outcome: Progressing  Intervention: Prevent and Manage Agitation  Recent Flowsheet Documentation  Taken 1/19/2025 1941 by Onelia Peña RN  Environment Familiarity/Consistency: daily routine followed  Intervention: Minimize Safety Risk  Recent Flowsheet Documentation  Taken 1/19/2025 1941 by Onelia Peña RN  Enhanced Safety Measures:   assistive devices when indicated   pain management   review medications for side effects with activity   room near unit station  Trust Relationship/Rapport:   care explained   choices provided   empathic listening provided   questions answered   questions encouraged   reassurance provided   thoughts/feelings acknowledged  Goal: Improved Attention and Thought Clarity  Outcome: Progressing  Intervention: Maximize Cognitive Function  Recent Flowsheet Documentation  Taken 1/19/2025 1941 by Onelia Peña RN  Sensory Stimulation Regulation:   care clustered   lighting decreased   quiet environment promoted  Reorientation Measures:   calendar in view   clock in view   familiar social contact encouraged  Goal: Improved Sleep  Outcome: Progressing  Intervention: Promote Sleep  Recent Flowsheet Documentation  Taken 1/19/2025 1941 by Onelia Peña RN  Sleep/Rest Enhancement:   awakenings minimized   comfort measures   regular sleep/rest pattern promoted   relaxation techniques promoted   room darkened     Problem: Cardiovascular Surgery  Goal: Improved Activity Tolerance  Outcome: Progressing  Intervention: Optimize Tolerance for Activity  Recent Flowsheet  Documentation  Taken 1/19/2025 1941 by Onelia Peña RN  Environmental Support:   calm environment promoted   distractions minimized   environmental consistency promoted   personal routine supported   rest periods encouraged  Goal: Optimal Coping with Heart Surgery  Outcome: Progressing  Intervention: Support Psychosocial Response to Surgery  Recent Flowsheet Documentation  Taken 1/19/2025 1941 by Onelia Peña RN  Supportive Measures:   active listening utilized   decision-making supported   relaxation techniques promoted   self-care encouraged   verbalization of feelings encouraged  Family/Support System Care:   involvement promoted   presence promoted   self-care encouraged  Goal: Absence of Bleeding  Outcome: Progressing  Intervention: Monitor and Manage Bleeding  Recent Flowsheet Documentation  Taken 1/19/2025 1941 by Onelia Peña RN  Bleeding Management: dressing monitored  Goal: Effective Bowel Elimination  Outcome: Progressing  Intervention: Enhance Bowel Motility and Elimination  Recent Flowsheet Documentation  Taken 1/19/2025 1941 by Onelia Peña RN  Bowel Motility Enhancement:   ambulation promoted   fluid intake encouraged   oral intake encouraged  Goal: Effective Cardiac Function  Outcome: Progressing  Goal: Optimal Cerebral Tissue Perfusion  Outcome: Progressing  Intervention: Protect and Optimize Cerebral Perfusion  Recent Flowsheet Documentation  Taken 1/19/2025 2350 by Onelia Peña RN  Head of Bed (HOB) Positioning: HOB at 20-30 degrees  Taken 1/19/2025 1941 by Onelia Peña RN  Sensory Stimulation Regulation:   care clustered   lighting decreased   quiet environment promoted  Cerebral Perfusion Promotion: blood pressure monitored  Glycemic Management: blood glucose monitored  Head of Bed (HOB) Positioning: HOB at 20-30 degrees  Goal: Fluid and Electrolyte Balance  Outcome: Progressing  Goal: Blood Glucose Level Within Targeted Range  Outcome: Progressing  Intervention: Optimize  Glycemic Control  Recent Flowsheet Documentation  Taken 1/19/2025 1941 by Onelia Peña RN  Glycemic Management: blood glucose monitored  Goal: Absence of Infection Signs and Symptoms  Outcome: Progressing  Intervention: Prevent or Manage Infection  Recent Flowsheet Documentation  Taken 1/19/2025 1941 by Onelia Peña RN  Infection Prevention:   environmental surveillance performed   hand hygiene promoted   personal protective equipment utilized   rest/sleep promoted   single patient room provided  Goal: Acceptable Pain Control  Outcome: Progressing  Intervention: Prevent or Manage Pain  Recent Flowsheet Documentation  Taken 1/19/2025 2350 by Onelia Peña RN  Pain Management Interventions:   emotional support   environmental changes   pillow support provided   relaxation techniques promoted   repositioned   rest  Taken 1/19/2025 1941 by Onelia Peña RN  Pain Management Interventions:   medication (see MAR)   emotional support   environmental changes   pillow support provided   relaxation techniques promoted   repositioned   rest  Diversional Activities: television  Goal: Nausea and Vomiting Relief  Outcome: Progressing  Intervention: Prevent or Manage Nausea and Vomiting  Recent Flowsheet Documentation  Taken 1/19/2025 2350 by Onelia Peña RN  Nausea/Vomiting Interventions:   antiemetic   sips of clear liquids given   slow deep breathing encouraged  Goal: Effective Urinary Elimination  Outcome: Progressing  Intervention: Monitor and Manage Urinary Retention  Recent Flowsheet Documentation  Taken 1/19/2025 1941 by Onelia Peña RN  Urinary Elimination Promotion:   absorbent pad/diaper use encouraged   catheter patency maintained   positioned for ease of voiding  Goal: Effective Oxygenation and Ventilation  Outcome: Progressing  Intervention: Promote Airway Secretion Clearance  Recent Flowsheet Documentation  Taken 1/19/2025 1941 by Onelia Peña RN  Cough And Deep Breathing: done independently  per patient  Airway/Ventilation Management: pulmonary hygiene promoted

## 2025-01-20 NOTE — PROGRESS NOTES
Calorie Count  Intake recorded for: 1/19  Total Kcals: 516 Total Protein: 21g  Kcals from Hospital Food: 516   Protein: 21g  Kcals from Outside Food (average):0 Protein: 0g  # Meals Ordered from Kitchen: 2 meals  # Meals Recorded: 1 meal (75% egg omelet w/ ham, mushroom, spinach, & mozzarella cheese, 2 blueberry muffins w/ 2 butters, 2 8oz strawberry kiwi nelson)  # Supplements Recorded: 0

## 2025-01-20 NOTE — PROGRESS NOTES
CVTS Progress Note  01/20/2025         Assessment and Plan:     Erwin Aguilar is a 64 year old male with a past medical history of DM2, HTN, PVD, and neuropathic pain who initially presented on 12/31/24 to the ED with arm pain, vomiting, and diarrhea; ACS and ST depression. He subsequently had VT arrest with 1 round of CPR, epinephrine, defibrillation. After ROSC was found to be in afib with RVR. Became hypotensive and was subsequently cannulated for VA ECMO. Found to have multivessel disease s/p CABG x4 on 1/2/25 with Dr. Rosenbaum. ECMO decannulated on 1/3/25 at bedside. ICU course complicated by large R PTX seen on chest CT on 1/4/25 s/p bedside CT placement, VAP, and encephalopathy. Extubated 1/11/25.       Cardiovascular:   Hx of CAD - s/p CABG x4 with Dr. Rosenbaum   Mixed shock - cardiogenic, vasoplegic, resolved   HTN  Pre-op VT Cardiac Arrest on VA ECMO (1/1 - 1/3)  HLD  Prolonged Qtc, improved, QTc now 492   Hx PAF on DOAC PTA - s/p MAZE & LAAC  No arrhythmias overnight, HD stable, in NSR.   Post operative ECHO 1/5 with LVEF 55-60%, normal RV function, no pericardial effusion   1/17 Repeat echo in setting of therapeutic AC showed LVEF 60-65%, small, loculated pericardial effusion without HD compromise  -  mg daily  - Rosuvastatin 40 mg daily   - Metoprolol tartrate to 50 mg BID   - Losartan 25mg daily  - Diuresis as below  - AC as below    Chest tubes: Removed in ICU   TPW: Removed in ICU     Pulmonary:  Acute Hypoxic Respiratory failure, resolved   Respiratory alkalosis 2:2 agitation - resolved   R tension pneumothorax, resolved   VAP  SubQ emphysema, improving  Right hemidiaphragm elevation  Bilateral pleural effusions, small & asymptomatic  Extubated POD 9. Now saturating well on RA.  - Supplemental O2 PRN to keep sats > 92%  - Pulm hygiene, IS, OOB/activity and deep breathing  - ABX detailed below   - PRN Mucomyst and Duoneb  - QID 3% saline nebs + Albuterol TID  - Chest physiotherapy per patient  request, stated it was helpful with mobilizing secretions TID  - Scheduled and prn albuterol nebs (PTA albuterol inhaler PRN)    Neurology:  Acute post-operative pain, improved  Encephalopathy, improved   Delirium   Anxiety   Chronic pain with opioid dependence  Neuropathy, PTA  Insomnia   Multimodal analgesia regimen:  - Acetaminophen PRN  - Lidocaine patches  - Modest dose Robaxin PRN in setting of improving delirium  - PRN hydroxyzine discontinued in s/o delirium   - PO oxycodone PRN frequency reduced (PTA 10mg TID PRN)  - PTA baclofen   - 1/1 vEEG without seizure activity (severe encephalopathy) and 12/31, 1/4, and 1/7 CTH negative for acute findings.   - PTA Sertraline  - Psych consulted for encephalopathy/delirium recs in s/o prolonged QTc; appreciate assistance  - Ramelteon for sleep   - PTA gabapentin resumed at reduced dose, look to titrate to PTA dosing in the next couple of days - HS dose increased to 900mg PTA dose 1/17  - Abilify BID for a time until encephalopathy improves before tapering to discontinuation. Started taper 1/19/24, with 2 mg daily for 3 days then stop   - Delirium precautions; sitter discontinued 1/16     / Renal / Fluid / Electrolytes:  Hypernatremia, resolved   AUSTIN/ATN, resolved  Hypervolemia, improving  Hypokalemia, 2/2 diuresis   BL creat ~ 0.6-0.8, creatinine at baseline; adequate UOP with multiple unmeasured voids.   - Diuresis: Bumex 2 mg PO daily   - 60 meq of potassium daily, changed to powder   - Replete lytes per protocol  - Strict I/O, daily weights  - Avoid/limit nephrotoxins as able  - Lymphedema consult for consideration of compression stocking to LLE    Urinary retention  Appreciate urology consult 1/19  Continue flomax   Remove ca catheter, follow post void protocol.   Plan once post void residual amount establish:  Frequency of SIC can be determined based on the average post-void residual:  If PVR < 150cc - no cathing needed  If PVR is 150-250cc - cath bid (qam &  qhs)  If PVR is 251-350cc - cath tid  If PVR is 351-450cc - cath qid  If PVR is > 450cc - cath every 4 hours  Patient can either be managed with a indwelling ca catheter vs clean intermittent catheterization until seen in f/u in urology clinic  If CIC is chosen, then the patient will need nursing instruction on proper self-intermittent catheterization technique.  Remind patient that CIC should be performed ONLY after an attempt at spontaneous voiding is made  The patient will need to keep a journal of his cathing regimen after discharge (will need to include frequency of cathing and post-void residual amount obtained from CIC) and bring this to clinic for review   If patient unwilling or unable to perform CIC, may leave ca catheter in place   Patient will need to f/u in urology clinic approximately 1 week after discharge for a trial of void and discussion of bladder emptying issues    GI / Nutrition:   Dysphagia   Severe malnutrition in the context of acute on chronic illness   Liquid stools   Orders Placed This Encounter      Regular Diet Adult  - Dietitian consulted and following, appreciate assistance  - PRN bowel regimen  - PRN Imodium 2 mg QID PRN     Endocrine:  Stress-induced hyperglycemia, resolved  Type 2 diabetes mellitus   Hgb A1c 5.7  - Initially managed on insulin drip postop, transitioned to sliding scale & Lantus  - Goal BG <180 for optimal healing  - Lantus 20 bedtime (PTA dose 26 unit at bedtime)     Infectious Disease:  Stress-induced leukocytosis, resolved  Sternal wound infection  Aspiration PNA: Klebsiella pneumoniae   WBC 6.4, afebrile  - Completed perioperative antibiotics  - Continue to monitor fever curve, CBC  - 1/10 C. Diff neg  - 1/14 infectious work-up for worsening leukocytosis negative thus far; follow for final results   - 1/14 BLE US neg for DVT  - 1/15 2-view CXR with low lung volumes, small bilateral pleural effusions but too small for drainage  - Defer respiratory viral  panel given stable on RA, afebrile  - Klebsiella pneumoniae from sputum cultures on 12/31 and 1/9  - Zosyn and vancomycin were restarted 1/9 for fever. Sputum culture again grew Klebsiella pneumoniae.   - 1/11 noted to have small puss drainage from sternotomy    - CT chest on 1/12/25, not concerning for deep infection  - CT CAP 1/17 to evaluate abd pain, continued unexplained leukocytosis. Demonstrated large substernal fluid collection.   - Plan to go to IR on Wednesday 1/22/2025 for fluid drainage and drain placement   - Hold Apixaban   - Discontinue Vanc &Zosyn per Dr. Rosenbaum, and monitor off abx      Hematology:   Acute blood loss anemia  Thrombocytopenia, resolved  Thrombocytosis, likely reactive, resolved   Therapeutic AC for PAF  Hgb stable; Plt WNL, no signs or symptoms of active bleeding  - CBC daily    Anticoagulation:   - ASA 81 mg   - PTA apixaban resumed for hx of PAF now s/p MAZE and LAAC; defer long-term AC plan to OP Cardiology, Holding 1/20/2025 per Dr. Rosenbaum in the setting of growing substernal fluid collection     MSK/Skin:  Sternotomy  Surgical incisions  Hx RLE BKA  - Sternal precautions  - Incisional cares per protocol  - Right groin decannulation wound dehisced at apex with staple removal, packing wet to dry BID    Prophylaxis:   - Stress ulcer prophylaxis: Pantoprazole 40 mg daily for 30 days  - DVT prophylaxis: Holding AC with substernal fluid collection, OOB/ambulation, SCD    Disposition:   - Transferred to  1/12  - Therapies recommending discharge to TCU - anticipate medical readiness tomorrow at conclusion of IV antibiotics    Dr. Rosenbaum updated.    Antonio Hernandez DNP APRN CNP CCRN   Cardiovascular Surgery   Available on [employer] or Secure Chat   Pager [phone]            Interval History:     Agreeable to remove ca catheter.   Agreeable to working with therapies   Mentation at baseline.   Denies SOB, states he is still having trouble mobilizing secretions.   Denies sternal  "popping/clicking.         Physical Exam:     Gen: NAD, resting in bed, calm, cooperative on exam, conversant  Neuro: alert & grossly oriented, no focal deficits.  CV: RRR on monitor - SR  Pulm: unlabored breathing on RA, occasional productive cough  Abd: + bowel sounds. Non-tender.   Ext: moderate pitting peripheral edema in BLE L>R, well healed RLE BKA stump,   Sternal incision intact, no erythema or induration, sternum stable, Sternum dry today with no drainage.   R groin incision covered, dressing c/d/I   Tubes/drain sites: CYRIL, scabbed over, no erythema or induration         Data:    /64 (BP Location: Right arm)   Pulse 65   Temp 97.5  F (36.4  C) (Oral)   Resp 19   Ht 1.803 m (5' 11\")   Wt 82 kg (180 lb 12.4 oz)   SpO2 94%   BMI 25.21 kg/m      Vitals:    25 0600 25 0430 25 0300   Weight: 87.8 kg (193 lb 9 oz) 82.9 kg (182 lb 12.2 oz) 82 kg (180 lb 12.4 oz)       Imaging:  Recent imaging reviewed    Recent Results (from the past 24 hours)   Echocardiogram Limited   Result Value    LVEF  55-60%    Narrative    [id number]  [id number]  [id number]  [id number]^VJ^DELPHINE^ARLIN     [facility]  Echocardiography Laboratory  [address] [place] [id number]     Name: SUSAN CHENG  MRN: [id number]  : 1960  Study Date: 2025 08:55 AM  Age: 64 yrs  Gender: Male  [place] [facility] [place] For Study: Pericardial Effusion  Ordering Physician: DELPHINE HERNANDEZ  Performed By: Milton Levin RDCS     BSA: 2.0 m2  Height: 71 in  Weight: 180 lb  HR: 75  BP: 139/77 mmHg  ______________________________________________________________________________  Procedure  Limited Echocardiogram with two-dimensional, color and spectral Doppler. The  final echo results were communicated to Aaron Hernandez.  ______________________________________________________________________________  Interpretation Summary  Large, loculated pericardial effusion anterior to " the right ventricle without  hemodynamic compromise. Slightly larger than observed on the 1/17/25 study.  ______________________________________________________________________________  Left Ventricle  Left ventricular function is normal.The ejection fraction is 55-60%.     Right Ventricle  The right ventricle is normal size. Global right ventricular function is  normal.     Mitral Valve  The mitral valve is normal.     Tricuspid Valve  The tricuspid valve is normal.     Vessels  IVC diameter and respiratory changes fall into an intermediate range  suggesting an RA pressure of 8 mmHg.     Pericardium  Large, loculated pericardial effusion anterior to the right ventricle without  hemodynamic compromise. Slightly larger than observed on the 1/17/25 study.     Miscellaneous  A left pleural effusion is present.  ______________________________________________________________________________  Report approved by: Alessio Marte MD on 01/20/2025 09:41 AM     ______________________________________________________________________________                 Labs:  Recent Labs   Lab 01/20/25  1124 01/20/25  0449 01/19/25  2111 01/19/25  2110 01/19/25  1607 01/19/25  0812 01/19/25  0437 01/18/25  0509 01/18/25  0506   WBC  --  6.8  --   --   --   --  6.4  --  12.3*   HGB  --  8.7*  --   --   --   --  8.4*  --  8.9*   MCV  --  96  --   --   --   --  95  --  97   PLT  --  275  --   --   --   --  326  --  470*   NA  --  138  --   --   --   --  141  --  140   POTASSIUM  --  3.7 3.7  --  3.1*  --  3.0*   < > 3.3*   CHLORIDE  --  106  --   --   --   --  106  --  106   CO2  --  24  --   --   --   --  27  --  24   BUN  --  9.8  --   --   --   --  16.2  --  22.0   CR  --  0.56*  --   --   --   --  0.65*  --  0.66*   ANIONGAP  --  8  --   --   --   --  8  --  10   [place]  --  8.6*  --   --   --   --  8.2*  --  8.7*   * 107*  --  106*  --    < > 115*   < > 111*    < > = values in this interval not displayed.      GLUCOSE:   Recent Labs    Lab 01/20/25  1124 01/20/25  0449 01/19/25  2110 01/19/25  1601 01/19/25  1212 01/19/25  0836   * 107* 106* 138* 102* 121*

## 2025-01-20 NOTE — PLAN OF CARE
Goal Outcome Evaluation:      Plan of Care Reviewed With: patient, niece      END of Shift Notes: 0700H - 1930H    Neuro: AO x 4, cooperative, frustrated and irritable    Cardiac: Sinus Rhythm, Hr = 65 - 80bpm     Respiratory: RA, SpO2 = %    GI/: on primofit, adequate urine output              Loose /  soft BM x 4, PRN loperamide given at 1343H    Diet/appetite: on regular diet; fair appetite    Activity: assist x 2; use of lift    Pain: denies pain    Skin: redness in the sacrum; mepelex applied in sacrum 1345H            Right groin incision - vash dressing  WTD done at 1745H    Lines: L DL PICC : Red Lumen on HL at 1200H, Purple Lumen on HL at 1745H               L PIV SL    Labs/ Protocols: on Mg, Phos, K                                BG = achs    Changes this shift: PRN loperamide given at  1343H                                   Rodriguez cath removed at 1730 H, applied primofit at 1741H                                   Lymph wraps applied by PT     Plan:   For Pericardiocentesis on 1/22/2025             TCU on discharge             Continue with POC. Notify primary care team with any concerns/updates

## 2025-01-20 NOTE — PROGRESS NOTES
CLINICAL NUTRITION SERVICES - BRIEF NOTE    Attempted room visit to encourage PO efforts and review heart healthy recommendations. Pt reports he is busy at this time and declined visit. Handout re: available snacks/supplements left at bedside. Information regarding heart healthy recommendations provided at last visit and included in discharge instructions. Last received TF 1/16.     1/17       Total Kcals: 0           Total Protein: 0g    1/18       Total Kcals: 473       Total Protein: 18g   1/19       Total Kcals: 516       Total Protein: 21g     INTERVENTIONS  Recommendations / Nutrition Prescription  Encourage PO efforts  Ensure Enlive chocolate BID at 10 am and 2 pm + supplements PRN     Implementation  Attempted visit/education    Linda Harley MS, RD, LD, CNSC    6C (beds 0510-1733) + 7C (beds 5202-9400) + ED + Obs  Available in Vocera by name or unit dietitian

## 2025-01-20 NOTE — PROGRESS NOTES
Care Management Follow Up    Additional information    1/20 - CHW delegated by KALEIGH, attempted to obtain TCU choices from pt at bedside. I entered the room and introduced myself, pt stated respiratory therapy will be in at any minute so I should come back later. I explained that all I'm here for is the TCU choice list if he has that ready. Pt stated he looked at several of them and asked who he can give the list to. I stated that is what I'm here for. Pt stated he cannot give me the list right now as he's busy. I stated this is no problem and I will try to come back in later. KALEIGH notified, no further action taken.      Meri Ruelas  Community Health Worker  6A, 6B, 6C  Ph [phone]  Fax [phone]

## 2025-01-21 ENCOUNTER — MYC MEDICAL ADVICE (OUTPATIENT)
Dept: FAMILY MEDICINE | Facility: CLINIC | Age: 65
End: 2025-01-21
Payer: MEDICARE

## 2025-01-21 ENCOUNTER — APPOINTMENT (OUTPATIENT)
Dept: PHYSICAL THERAPY | Facility: CLINIC | Age: 65
DRG: 003 | End: 2025-01-21
Payer: MEDICARE

## 2025-01-21 ENCOUNTER — APPOINTMENT (OUTPATIENT)
Dept: SPEECH THERAPY | Facility: CLINIC | Age: 65
DRG: 003 | End: 2025-01-21
Payer: MEDICARE

## 2025-01-21 LAB
ANION GAP SERPL CALCULATED.3IONS-SCNC: 9 MMOL/L (ref 7–15)
BUN SERPL-MCNC: 7.5 MG/DL (ref 8–23)
CALCIUM SERPL-MCNC: 8.2 MG/DL (ref 8.8–10.4)
CHLORIDE SERPL-SCNC: 105 MMOL/L (ref 98–107)
CREAT SERPL-MCNC: 0.52 MG/DL (ref 0.67–1.17)
EGFRCR SERPLBLD CKD-EPI 2021: >90 ML/MIN/1.73M2
ERYTHROCYTE [DISTWIDTH] IN BLOOD BY AUTOMATED COUNT: 16.4 % (ref 10–15)
GLUCOSE BLDC GLUCOMTR-MCNC: 117 MG/DL (ref 70–99)
GLUCOSE BLDC GLUCOMTR-MCNC: 138 MG/DL (ref 70–99)
GLUCOSE BLDC GLUCOMTR-MCNC: 143 MG/DL (ref 70–99)
GLUCOSE BLDC GLUCOMTR-MCNC: 169 MG/DL (ref 70–99)
GLUCOSE SERPL-MCNC: 122 MG/DL (ref 70–99)
HCO3 SERPL-SCNC: 26 MMOL/L (ref 22–29)
HCT VFR BLD AUTO: 28.3 % (ref 40–53)
HGB BLD-MCNC: 9 G/DL (ref 13.3–17.7)
MAGNESIUM SERPL-MCNC: 1.8 MG/DL (ref 1.7–2.3)
MCH RBC QN AUTO: 30 PG (ref 26.5–33)
MCHC RBC AUTO-ENTMCNC: 31.8 G/DL (ref 31.5–36.5)
MCV RBC AUTO: 94 FL (ref 78–100)
PHOSPHATE SERPL-MCNC: 2.4 MG/DL (ref 2.5–4.5)
PLATELET # BLD AUTO: 282 10E3/UL (ref 150–450)
POTASSIUM SERPL-SCNC: 3.1 MMOL/L (ref 3.4–5.3)
POTASSIUM SERPL-SCNC: 3.3 MMOL/L (ref 3.4–5.3)
POTASSIUM SERPL-SCNC: 3.6 MMOL/L (ref 3.4–5.3)
RBC # BLD AUTO: 3 10E6/UL (ref 4.4–5.9)
S100 CA BINDING PROTEIN B SER-MCNC: 83 NG/L
SODIUM SERPL-SCNC: 140 MMOL/L (ref 135–145)
WBC # BLD AUTO: 6.2 10E3/UL (ref 4–11)

## 2025-01-21 PROCEDURE — 94640 AIRWAY INHALATION TREATMENT: CPT | Mod: 76

## 2025-01-21 PROCEDURE — 250N000013 HC RX MED GY IP 250 OP 250 PS 637: Performed by: STUDENT IN AN ORGANIZED HEALTH CARE EDUCATION/TRAINING PROGRAM

## 2025-01-21 PROCEDURE — 250N000009 HC RX 250

## 2025-01-21 PROCEDURE — 85041 AUTOMATED RBC COUNT: CPT

## 2025-01-21 PROCEDURE — 250N000013 HC RX MED GY IP 250 OP 250 PS 637: Performed by: NURSE PRACTITIONER

## 2025-01-21 PROCEDURE — 82310 ASSAY OF CALCIUM: CPT

## 2025-01-21 PROCEDURE — 250N000013 HC RX MED GY IP 250 OP 250 PS 637

## 2025-01-21 PROCEDURE — 84100 ASSAY OF PHOSPHORUS: CPT | Performed by: STUDENT IN AN ORGANIZED HEALTH CARE EDUCATION/TRAINING PROGRAM

## 2025-01-21 PROCEDURE — 84132 ASSAY OF SERUM POTASSIUM: CPT | Performed by: STUDENT IN AN ORGANIZED HEALTH CARE EDUCATION/TRAINING PROGRAM

## 2025-01-21 PROCEDURE — 92526 ORAL FUNCTION THERAPY: CPT | Mod: GN

## 2025-01-21 PROCEDURE — 250N000011 HC RX IP 250 OP 636: Performed by: SURGERY

## 2025-01-21 PROCEDURE — 250N000013 HC RX MED GY IP 250 OP 250 PS 637: Performed by: SURGERY

## 2025-01-21 PROCEDURE — 94640 AIRWAY INHALATION TREATMENT: CPT

## 2025-01-21 PROCEDURE — 84132 ASSAY OF SERUM POTASSIUM: CPT | Performed by: INTERNAL MEDICINE

## 2025-01-21 PROCEDURE — 120N000003 HC R&B IMCU UMMC

## 2025-01-21 PROCEDURE — 97140 MANUAL THERAPY 1/> REGIONS: CPT | Mod: GP | Performed by: PHYSICAL THERAPIST

## 2025-01-21 PROCEDURE — 250N000011 HC RX IP 250 OP 636

## 2025-01-21 PROCEDURE — 97530 THERAPEUTIC ACTIVITIES: CPT | Mod: GP | Performed by: PHYSICAL THERAPIST

## 2025-01-21 PROCEDURE — 94799 UNLISTED PULMONARY SVC/PX: CPT

## 2025-01-21 PROCEDURE — 80048 BASIC METABOLIC PNL TOTAL CA: CPT

## 2025-01-21 PROCEDURE — 85014 HEMATOCRIT: CPT

## 2025-01-21 PROCEDURE — 82565 ASSAY OF CREATININE: CPT

## 2025-01-21 PROCEDURE — 250N000011 HC RX IP 250 OP 636: Performed by: PHYSICIAN ASSISTANT

## 2025-01-21 PROCEDURE — 83735 ASSAY OF MAGNESIUM: CPT | Performed by: STUDENT IN AN ORGANIZED HEALTH CARE EDUCATION/TRAINING PROGRAM

## 2025-01-21 PROCEDURE — 999N000157 HC STATISTIC RCP TIME EA 10 MIN

## 2025-01-21 PROCEDURE — 250N000011 HC RX IP 250 OP 636: Performed by: STUDENT IN AN ORGANIZED HEALTH CARE EDUCATION/TRAINING PROGRAM

## 2025-01-21 PROCEDURE — 94668 MNPJ CHEST WALL SBSQ: CPT

## 2025-01-21 RX ORDER — POTASSIUM CHLORIDE 29.8 MG/ML
20 INJECTION INTRAVENOUS
Status: COMPLETED | OUTPATIENT
Start: 2025-01-21 | End: 2025-01-21

## 2025-01-21 RX ORDER — POTASSIUM CHLORIDE 1.5 G/1.58G
40 POWDER, FOR SOLUTION ORAL ONCE
Status: COMPLETED | OUTPATIENT
Start: 2025-01-21 | End: 2025-01-21

## 2025-01-21 RX ORDER — MAGNESIUM OXIDE 400 MG/1
400 TABLET ORAL EVERY 4 HOURS
Status: COMPLETED | OUTPATIENT
Start: 2025-01-21 | End: 2025-01-21

## 2025-01-21 RX ADMIN — METOPROLOL TARTRATE 50 MG: 25 TABLET, FILM COATED ORAL at 20:52

## 2025-01-21 RX ADMIN — ONDANSETRON 4 MG: 4 TABLET, ORALLY DISINTEGRATING ORAL at 11:05

## 2025-01-21 RX ADMIN — OXYCODONE HYDROCHLORIDE 10 MG: 10 TABLET ORAL at 23:08

## 2025-01-21 RX ADMIN — ARIPIPRAZOLE 2 MG: 2 TABLET ORAL at 08:29

## 2025-01-21 RX ADMIN — CALCIUM CARBONATE (ANTACID) CHEW TAB 500 MG 1000 MG: 500 CHEW TAB at 08:29

## 2025-01-21 RX ADMIN — MAGNESIUM OXIDE TAB 400 MG (241.3 MG ELEMENTAL MG) 400 MG: 400 (241.3 MG) TAB at 10:37

## 2025-01-21 RX ADMIN — POTASSIUM CHLORIDE 20 MEQ: 29.8 INJECTION, SOLUTION INTRAVENOUS at 09:27

## 2025-01-21 RX ADMIN — BACLOFEN 10 MG: 10 TABLET ORAL at 08:28

## 2025-01-21 RX ADMIN — LOSARTAN POTASSIUM 25 MG: 25 TABLET, FILM COATED ORAL at 08:27

## 2025-01-21 RX ADMIN — Medication 5 ML: at 09:31

## 2025-01-21 RX ADMIN — RAMELTEON 8 MG: 8 TABLET ORAL at 22:02

## 2025-01-21 RX ADMIN — BUMETANIDE 2 MG: 2 TABLET ORAL at 08:30

## 2025-01-21 RX ADMIN — OXYCODONE HYDROCHLORIDE 10 MG: 10 TABLET ORAL at 18:16

## 2025-01-21 RX ADMIN — TAMSULOSIN HYDROCHLORIDE 0.4 MG: 0.4 CAPSULE ORAL at 08:29

## 2025-01-21 RX ADMIN — MAGNESIUM OXIDE TAB 400 MG (241.3 MG ELEMENTAL MG) 400 MG: 400 (241.3 MG) TAB at 08:28

## 2025-01-21 RX ADMIN — GABAPENTIN 900 MG: 300 CAPSULE ORAL at 22:02

## 2025-01-21 RX ADMIN — LOPERAMIDE HYDROCHLORIDE 2 MG: 2 CAPSULE ORAL at 18:32

## 2025-01-21 RX ADMIN — ONDANSETRON 4 MG: 4 TABLET, ORALLY DISINTEGRATING ORAL at 23:58

## 2025-01-21 RX ADMIN — SODIUM CHLORIDE SOLN NEBU 3% 3 ML: 3 NEBU SOLN at 21:04

## 2025-01-21 RX ADMIN — Medication 10 ML: at 04:18

## 2025-01-21 RX ADMIN — SODIUM CHLORIDE SOLN NEBU 3% 3 ML: 3 NEBU SOLN at 15:08

## 2025-01-21 RX ADMIN — LOPERAMIDE HYDROCHLORIDE 2 MG: 2 CAPSULE ORAL at 08:35

## 2025-01-21 RX ADMIN — ASPIRIN 81 MG CHEWABLE TABLET 81 MG: 81 TABLET CHEWABLE at 08:29

## 2025-01-21 RX ADMIN — ROSUVASTATIN CALCIUM 40 MG: 20 TABLET, FILM COATED ORAL at 08:28

## 2025-01-21 RX ADMIN — SERTRALINE HYDROCHLORIDE 200 MG: 100 TABLET ORAL at 08:30

## 2025-01-21 RX ADMIN — SODIUM CHLORIDE SOLN NEBU 3% 3 ML: 3 NEBU SOLN at 09:23

## 2025-01-21 RX ADMIN — BACLOFEN 10 MG: 10 TABLET ORAL at 23:06

## 2025-01-21 RX ADMIN — THERA TABS 1 TABLET: TAB at 08:29

## 2025-01-21 RX ADMIN — POTASSIUM CHLORIDE 20 MEQ: 29.8 INJECTION, SOLUTION INTRAVENOUS at 10:12

## 2025-01-21 RX ADMIN — POTASSIUM CHLORIDE 20 MEQ: 29.8 INJECTION, SOLUTION INTRAVENOUS at 18:16

## 2025-01-21 RX ADMIN — METOPROLOL TARTRATE 50 MG: 25 TABLET, FILM COATED ORAL at 08:28

## 2025-01-21 RX ADMIN — LIDOCAINE 4% 2 PATCH: 40 PATCH TOPICAL at 20:52

## 2025-01-21 RX ADMIN — ONDANSETRON 4 MG: 4 TABLET, ORALLY DISINTEGRATING ORAL at 18:16

## 2025-01-21 RX ADMIN — POTASSIUM CHLORIDE 20 MEQ: 29.8 INJECTION, SOLUTION INTRAVENOUS at 19:23

## 2025-01-21 ASSESSMENT — ACTIVITIES OF DAILY LIVING (ADL)
ADLS_ACUITY_SCORE: 51
ADLS_ACUITY_SCORE: 55
ADLS_ACUITY_SCORE: 51
ADLS_ACUITY_SCORE: 51
ADLS_ACUITY_SCORE: 55
ADLS_ACUITY_SCORE: 55
ADLS_ACUITY_SCORE: 51
ADLS_ACUITY_SCORE: 55
ADLS_ACUITY_SCORE: 51
ADLS_ACUITY_SCORE: 55
ADLS_ACUITY_SCORE: 51
ADLS_ACUITY_SCORE: 55
ADLS_ACUITY_SCORE: 51

## 2025-01-21 NOTE — PLAN OF CARE
Temp: 97.5  F (36.4  C) Temp src: Oral BP: 133/79 Pulse: 71   Resp: 18 SpO2: 98 % O2 Device: Nasal cannula Oxygen Delivery: 1 LPM     Hours of care: 5580-5328    D: Presented on 12/31/24 to the ED with arm pain, vomiting, and diarrhea; ACS and ST depression. He subsequently had VT arrest with 1 round of CPR, epinephrine, defibrillation. After ROSC was found to be in afib with RVR. Became hypotensive and was subsequently cannulated for VA ECMO. Found to have multivessel disease s/p CABG x4 on 1/2/25 with Dr. Rosenbaum.     I/A: Erwin (he/him) is A/O x4. Calls appropriately, calm and cooperative. Tele in place, SR. VSS on 1L NC overnight per pt request for comfort, on RA during days. L PIV SL and L DL PICC in place heparin locked. Hx of R BKA, R groin site with packing - CDI. Sternal incision, L leg graft sites, old CT sites, and scattered bruising. No new skin deficits noted. Tolerating regular diet. Retaining urine after removal of ca catheter around 1730. Straight cath of 1050 mL out and obtained orders to keep ca catheter in. Incontinent of bowel, 2 smear of BM this shift. Up Ax2 with lift, not OOB this shift. Appeared to sleep decent overnight.    Changed: acute retention of urine, obtained orders to re-insert ca catheter  Running:   PRN:    P: Continue to monitor and follow POC. Plan for pericardiocentesis on 1/22. Notify CVTS with changes.    Goal Outcome Evaluation:    Plan of Care Reviewed With: patient  Overall Patient Progress: no change  Outcome Evaluation: VSS on 1L NC overnight. Pt still retaining urine after removal of ca catheter, straight cath 1050 mL out and obtained orders to re-insert ca catheter. Denies pain.

## 2025-01-21 NOTE — PROGRESS NOTES
CVTS Progress Note  01/21/2025         Assessment and Plan:     Erwin Aguilar is a 64 year old male with a past medical history of DM2, HTN, PVD, and neuropathic pain who initially presented on 12/31/24 to the ED with arm pain, vomiting, and diarrhea; ACS and ST depression. He subsequently had VT arrest with 1 round of CPR, epinephrine, defibrillation. After ROSC was found to be in afib with RVR. Became hypotensive and was subsequently cannulated for VA ECMO. Found to have multivessel disease s/p CABG x4 on 1/2/25 with Dr. Rosenbaum. ECMO decannulated on 1/3/25 at bedside. ICU course complicated by large R PTX seen on chest CT on 1/4/25 s/p bedside CT placement, VAP, and encephalopathy. Extubated 1/11/25.       Cardiovascular:   Hx of CAD - s/p CABG x4 with Dr. Rosenbaum   Mixed shock - cardiogenic, vasoplegic, resolved   HTN  Pre-op VT Cardiac Arrest on VA ECMO (1/1 - 1/3)  HLD  Prolonged Qtc, improved, QTc now 492   Hx PAF on DOAC PTA - s/p MAZE & LAAC  No arrhythmias overnight, HD stable, in NSR.   Post operative ECHO 1/5 with LVEF 55-60%, normal RV function, no pericardial effusion   1/17 Repeat echo in setting of therapeutic AC showed LVEF 60-65%, small, loculated pericardial effusion without HD compromise  1/21 Echo showed EF 55-60%, large pericardial effusion without HD compromise, and RA pressure of 8 mmHg  -  mg daily  - Rosuvastatin 40 mg daily   - Metoprolol tartrate to 50 mg BID   - Losartan 25mg daily  - Diuresis as below  - AC as below    Chest tubes: Removed in ICU   TPW: Removed in ICU     Pulmonary:  Acute Hypoxic Respiratory failure, resolved   Respiratory alkalosis 2:2 agitation - resolved   R tension pneumothorax, resolved   VAP  SubQ emphysema, improving  Right hemidiaphragm elevation  Bilateral pleural effusions, small & asymptomatic  Extubated POD 9. Now saturating well on RA.  - Supplemental O2 PRN to keep sats > 92%  - Pulm hygiene, IS, OOB/activity and deep breathing  - ABX detailed below    - PRN Mucomyst and Duoneb  - QID 3% saline nebs + Albuterol TID  - Chest physiotherapy per patient request, stated it was helpful with mobilizing secretions TID  - Scheduled and prn albuterol nebs (PTA albuterol inhaler PRN)    Neurology:  Acute post-operative pain, improved  Encephalopathy, improved   Delirium   Anxiety   Chronic pain with opioid dependence  Neuropathy, PTA  Insomnia   Multimodal analgesia regimen:  - Acetaminophen PRN  - Lidocaine patches  - Modest dose Robaxin PRN in setting of improving delirium  - PRN hydroxyzine discontinued in s/o delirium   - PO oxycodone PRN frequency reduced (PTA 10mg TID PRN)  - PTA baclofen   - 1/1 vEEG without seizure activity (severe encephalopathy) and 12/31, 1/4, and 1/7 CTH negative for acute findings.   - PTA Sertraline  - Psych consulted for encephalopathy/delirium recs in s/o prolonged QTc; appreciate assistance  - Ramelteon for sleep   - PTA gabapentin resumed at reduced dose, look to titrate to PTA dosing in the next couple of days - HS dose increased to 900mg PTA dose 1/17  - Abilify BID for a time until encephalopathy improves before tapering to discontinuation. Started taper 1/19/24, with 2 mg daily for 3 days then stop. Now completed.   - Delirium precautions; sitter discontinued 1/16     / Renal / Fluid / Electrolytes:  Hypernatremia, resolved   AUSTIN/ATN, resolved  Hypervolemia, improving  Hypokalemia, 2/2 diuresis   BL creat ~ 0.6-0.8, creatinine at baseline; adequate UOP with multiple unmeasured voids.   - Diuresis: Bumex 2 mg PO daily   - 60 meq of potassium daily, changed to powder   - Replete lytes per protocol  - Strict I/O, daily weights  - Avoid/limit nephrotoxins as able  - Lymphedema consult for consideration of compression stocking to LLE    Urinary retention  Appreciate urology consult 1/19  Continue flomax   Remove ca catheter, follow post void protocol.   Plan once post void residual amount establish:  Frequency of SIC can be determined  based on the average post-void residual:  If PVR < 150cc - no cathing needed  If PVR is 150-250cc - cath bid (qam & qhs)  If PVR is 251-350cc - cath tid  If PVR is 351-450cc - cath qid  If PVR is > 450cc - cath every 4 hours  Patient can either be managed with a indwelling ca catheter vs clean intermittent catheterization until seen in f/u in urology clinic  If CIC is chosen, then the patient will need nursing instruction on proper self-intermittent catheterization technique.  Remind patient that CIC should be performed ONLY after an attempt at spontaneous voiding is made  The patient will need to keep a journal of his cathing regimen after discharge (will need to include frequency of cathing and post-void residual amount obtained from CIC) and bring this to clinic for review   If patient unwilling or unable to perform CIC, may leave ca catheter in place   Patient will need to f/u in urology clinic approximately 1 week after discharge for a trial of void and discussion of bladder emptying issues    GI / Nutrition:   Dysphagia   Severe malnutrition in the context of acute on chronic illness   Liquid stools   Orders Placed This Encounter      Regular Diet Adult      NPO per Anesthesia Guidelines for Procedure/Surgery Except for: Meds  - Dietitian consulted and following, appreciate assistance  - PRN bowel regimen  - PRN Imodium 2 mg QID PRN     Endocrine:  Stress-induced hyperglycemia, resolved  Type 2 diabetes mellitus   Hgb A1c 5.7  - Initially managed on insulin drip postop, transitioned to sliding scale & Lantus  - Goal BG <180 for optimal healing  - Lantus 20 bedtime (PTA dose 26 unit at bedtime)     Infectious Disease:  Stress-induced leukocytosis, resolved  Sternal wound infection  Aspiration PNA: Klebsiella pneumoniae   WBC 6.4, afebrile  - Completed perioperative antibiotics  - Continue to monitor fever curve, CBC  - 1/10 C. Diff neg  - 1/14 infectious work-up for worsening leukocytosis negative thus  far; follow for final results   - 1/14 BLE US neg for DVT  - 1/15 2-view CXR with low lung volumes, small bilateral pleural effusions but too small for drainage  - Defer respiratory viral panel given stable on RA, afebrile  - Klebsiella pneumoniae from sputum cultures on 12/31 and 1/9. Completed course of Zosyn from 1/9-1/20.   - Zosyn and vancomycin were restarted 1/9 for fever. Sputum culture again grew Klebsiella pneumoniae.   - 1/11 noted to have small puss drainage from sternotomy    - CT chest on 1/12/25, not concerning for deep infection  - CT CAP 1/17 to evaluate abd pain, continued unexplained leukocytosis. Demonstrated large substernal fluid collection.   - Plan to go to IR on Wednesday 1/22/2025 for fluid drainage and drain placement   - Hold Apixaban   - Discontinue Vanc &Zosyn per Dr. Rosenbaum, and monitor off abx  Off all antibiotics since 1/20.      Hematology:   Acute blood loss anemia  Thrombocytopenia, resolved  Thrombocytosis, likely reactive, resolved   Therapeutic AC for PAF  Hgb stable; Plt WNL, no signs or symptoms of active bleeding  - CBC daily    Anticoagulation:   - ASA 81 mg   - PTA apixaban resumed for hx of PAF now s/p MAZE and LAAC; defer long-term AC plan to OP Cardiology, Holding 1/20/2025 per Dr. Rosenbaum in the setting of growing substernal fluid collection     MSK/Skin:  Sternotomy  Surgical incisions  Hx RLE BKA  - Sternal precautions  - Incisional cares per protocol  - Right groin decannulation wound dehisced at apex with staple removal, packing wet to dry BID    Prophylaxis:   - Stress ulcer prophylaxis: Pantoprazole 40 mg daily for 30 days  - DVT prophylaxis: Holding AC with substernal fluid collection, OOB/ambulation, SCD    Disposition:   - Transferred to  1/12  - Therapies recommending discharge to TCU   - IR tomorrow for pericardial drain for large pericardial effusion. Discharge 3-5 days.     Seen with Dr. Robi Schulz PA-C  Cardiothoracic Surgery  Pager  "[phone]  2025          Interval History:     Agreeable to working with therapies. Motivated to get prosthesis to fit.    Mentation at baseline.   Denies SOB, states he is still having trouble mobilizing secretions. Appetite good. +BM. No chest pain. Surgical pain under control.   Denies sternal popping/clicking.         Physical Exam:     Gen: NAD, resting in bed, calm, cooperative on exam, conversant  Neuro: alert & grossly oriented, no focal deficits.  CV: RRR on monitor - SR  Pulm: unlabored breathing on RA, occasional productive cough, audible secretions in airway.   Abd:  Non-tender. +BM  Ext: moderate pitting peripheral edema in BLE L>R, well healed RLE BKA stump, +2 in stump.   Sternal incision intact, no erythema or induration, sternum stable, Sternum dry today with no drainage. Dermatitis surrounding incision.   R groin wound packed, dressing c/d/I. No erythema.   Tubes/drain sites: CYRIL, scabbed over, no erythema or induration         Data:    BP (!) 146/80 (BP Location: Right arm)   Pulse 74   Temp 98.4  F (36.9  C) (Oral)   Resp 18   Ht 1.803 m (5' 11\")   Wt 83 kg (182 lb 15.7 oz)   SpO2 96%   BMI 25.52 kg/m      Vitals:    25 0430 25 0300 25 0657   Weight: 82.9 kg (182 lb 12.2 oz) 82 kg (180 lb 12.4 oz) 83 kg (182 lb 15.7 oz)       Imaging:     Alessio Miramontes MD  [phone]  [phone] 2025      Result Text  [id number]  [id number]  [id number]  [id number]^VJ^DELPHINE^ARLIN     [facility],[place]  Echocardiography Laboratory  [address] [place] [id number]     Name: SUSAN CHENG  MRN: [id number]  : 1960  Study Date: 2025 08:55 AM  Age: 64 yrs  Gender: Male  [place] [facility] [place] For Study: Pericardial Effusion  Ordering Physician: DELPHINE ZABALA  Performed By: Milton Levin RDCS     BSA: 2.0 m2  Height: 71 in  Weight: 180 lb  HR: 75  BP: 139/77 " mmHg  ______________________________________________________________________________  Procedure  Limited Echocardiogram with two-dimensional, color and spectral Doppler. The  final echo results were communicated to Aaron Hernandez.  ______________________________________________________________________________  Interpretation Summary  Large, loculated pericardial effusion anterior to the right ventricle without  hemodynamic compromise. Slightly larger than observed on the 1/17/25 study.  ______________________________________________________________________________  Left Ventricle  Left ventricular function is normal.The ejection fraction is 55-60%.     Right Ventricle  The right ventricle is normal size. Global right ventricular function is  normal.     Mitral Valve  The mitral valve is normal.     Tricuspid Valve  The tricuspid valve is normal.     Vessels  IVC diameter and respiratory changes fall into an intermediate range  suggesting an RA pressure of 8 mmHg.     Pericardium  Large, loculated pericardial effusion anterior to the right ventricle without  hemodynamic compromise. Slightly larger than observed on the 1/17/25 study.     Miscellaneous  A left pleural effusion is present.  ______________________________________________________________________________  Report approved by: Alessio Marte MD on 01/20/2025 09:41 AM     ____________________________________________________________________                 Labs:  Recent Labs   Lab 01/21/25  0833 01/21/25  0417 01/20/25 2044 01/20/25  1124 01/20/25  0449 01/19/25  2111 01/19/25  0812 01/19/25  0437   WBC  --  6.2  --   --  6.8  --   --  6.4   HGB  --  9.0*  --   --  8.7*  --   --  8.4*   MCV  --  94  --   --  96  --   --  95   PLT  --  282  --   --  275  --   --  326   NA  --  140  --   --  138  --   --  141   POTASSIUM  --  3.1*  --   --  3.7 3.7   < > 3.0*   CHLORIDE  --  105  --   --  106  --   --  106   CO2  --  26  --   --  24  --   --  27   BUN  --  7.5*   --   --  9.8  --   --  16.2   CR  --  0.52*  --   --  0.56*  --   --  0.65*   ANIONGAP  --  9  --   --  8  --   --  8   [place]  --  8.2*  --   --  8.6*  --   --  8.2*   * 122* 156*   < > 107*  --    < > 115*    < > = values in this interval not displayed.      GLUCOSE:   Recent Labs   Lab 01/21/25  0833 01/21/25  0417 01/20/25  2044 01/20/25  1853 01/20/25  1124 01/20/25  0449   * 122* 156* 132* 146* 107*

## 2025-01-21 NOTE — PROGRESS NOTES
Full          Erwin Aguilar          CVTS                   12/31    63yo     Dx: VT cardiac arrest, STEMI, CABG x4     Hx: DM2, HTN, PVD, and neuropathic pain who initially presented on 12/31/24 to the ED with arm pain, vomiting, and diarrhea; ACS and ST depression         NEURO: A&O x4 mood improved til 1730 upset about K                  R BKA T&R (chronic back issues)                 Up x2 with lift--refused recliner-up edge bed                 Joel pain oxy/robaxin/abilify                 Prosthesis for stump being refitted                    RESPIRATORY: Course                             RA (keep >92)                                                      Chest percussion/nebs/IS     CARDIAC: SR  EF 60-65                     lymph wraps to LLE     GI/: incontinent--imodium given              Rodriguez-pink tinged urine               Reg diet---NPO after MN for drain tmrw              ACCU cks     SKIN: sternal incision frances pack r groin bid changed 1/21     LDAs: L PIV             L DL PICC K runs given (refused pks)       1/22 IR to get a pericardial drain for a large pericardial effusion

## 2025-01-21 NOTE — PROGRESS NOTES
IR MISC NOTE:    Plan for 12Fr Mediastinal drain via CT on 1/22/25. Approved by Dr. Osorio, discussed with Dr. Rouse. Will update INR. Patient to be NPO. Will have antibiotics in signed and held orders. Apixaban has been held since yesterday 1/20/25. Per Dr. Osorio, ok with ASA 81mg.     Order placed.     We will notify patient that there is a risk of arrythmias associated with this drain placement.     Fabiana Saha PA-C

## 2025-01-21 NOTE — PROGRESS NOTES
Care Management Follow Up    Length of Stay (days): 21    Expected Discharge Date: 01/27/2025     Concerns to be Addressed: denies needs/concerns at this time     Patient plan of care discussed at interdisciplinary rounds: Yes    Anticipated Discharge Disposition: TCU (PT 1/21, OT 1/12)     SW will need to obtain updated recs from OT.         Anticipated Discharge Services: TCU therapy services  Anticipated Discharge DME: n/a    Patient/family educated on Medicare website which has current facility and service quality ratings: yes  Education Provided on the Discharge Plan: Yes  Patient/Family in Agreement with the Plan:  yes    Referrals Placed by CM/SW:  SW tasked CHW to obtain TCU choices so see CHW note 1/21 for details. SW will need to provide pt with new TCU list and education on TCU recommendation.    Pt is being followed by  TCU.    [facility]  [address] [address]  4th floor of [facility]  Phone: [phone]  Nurse to Nurse: [phone]   Private pay costs discussed: Not applicable    Discussed  Partnership in Safe Discharge Planning  document with patient/family: No     Handoff Completed: No, handoff not indicated or clinically appropriate    Additional Information:  Per CVTS provider note 1/21, pt is to go to IR tomorrow 1/22 to get a pericardial drain for a large pericardial effusion with JIA being 3-5 days.    Next Steps: KALEIGH to provide TCU education to pt.    ___________________    TINO Nieves, SHANI  6C , covering beds 6401 to 6519  [facility]   Phone: [phone]  6C Cards KALEIGH Campbell

## 2025-01-21 NOTE — PROGRESS NOTES
CLINICAL NUTRITION SERVICES - REASSESSMENT NOTE     Nutrition Prescription    RECOMMENDATIONS FOR MDs/PROVIDERS TO ORDER:  Continue to monitor/replace lytes PRN    Malnutrition Status:    Moderate malnutrition in the context of chronic illness/disease    Recommendations already ordered by Registered Dietitian (RD):  Encourage 3 meals/day with protein sources  Outside food as needed to promote PO efforts  Nutrition education: Attempted to review heart healthy diet guidelines. Pt not interested in education at this time.   Continue MVI  Thiamine 100 mg for 7 days in setting of likely losses with bumex 2mg daily  D/c'ed banatrol TF and prosource TF20 as pt no longer has feeding tube    Future/Additional Recommendations:  Monitor nutrition related findings and follow up per protocol     EVALUATION OF THE PROGRESS TOWARD GOALS   Diet: Regular    Intake/Tolerance: Patient consuming 0-75 % of 2 meal(s) daily.   Last received EN on 1/16 per I/O's.   NEW FINDINGS   Notified by  staff that pt is not drinking and does not want Ensure. Order discontinued at this time.   Met with pt at bedside. Pt reports improving appetite. Pt states he had eggs, muffins for breakfast. Reports he had [place] food for lunch. Reports nausea related to K+ replacement, Reports diarrhea d/t low Mg. Declined any additional nutrition interventions at this time. Noted pt outside food/snacks at bedside. Pt reports he has been drinking [employer] to maintain blood sugar. Declined any additional education at this time. Encouraged continued PO efforts with protein sources.   SLP recs: regular diet as of 1/21  GI:  Last BM: 01/21/25 x6  Imodium prn      Weight:  Most Recent Weight: 83 kg (182 lb 15.7 oz)  on 1/21/25 via Bed scale  Body mass index is 25.52 kg/m .  Wt up 7 kg from admission. I/O's -7.5L since admit    Meds:  Bumex  Tums  Banatrol TF BID  Insulin  Thera-vit  Neutra-phos  Kcl   Prosource TF20  Crestor      Labs:  K+ 3.1  BUN 7.5  Cre  .52  Phos 2.4  Glu 122  Hgb 9  Hematocrit 28.3    Skin:  reviewed    MALNUTRITION  % Intake: No decreased intake noted  % Weight Loss: Weight loss does not meet criteria  Subcutaneous Fat Loss: Facial region:  Mild and Upper arm:  Mild*  Muscle Loss: Temporal:  Moderate-Severe, Thoracic region (clavicle, acromium bone, deltoid, trapezius, pectoral):  Moderate, Upper arm (bicep, tricep):  Mild, Lower arm  (forearm):  Mild, and Posterior calf (L - has R BKA):  Mild-moderate*  Fluid Accumulation/Edema: none noted  Malnutrition Diagnosis: Moderate malnutrition in the context of chronic illness.  *per last assessment. Deferred NFPE today as pt was frustrated  Previous Goals   Total avg nutritional intake to meet a minimum of 25 kcal/kg and 1.5 g PRO/kg daily (per dosing wt 79 kg).   Evaluation: Not met but improving since last assessment    Previous Nutrition Diagnosis  Inadequate oral intake related to decreased appetite and dislike of dysphagia diet as evidenced by wife/sitter report and reliance on TF to meet 100% of nutrition needs.    Evaluation: Improving    CURRENT NUTRITION DIAGNOSIS  Increased nutrient needs  related to increased metabolic demands post-op as evidenced by s/p CABG x4 on, ECMO decannulated on 1/3/25 at bedside, large R PTX seen on chest CT on 1/4/25 s/p bedside CT placement, VAP, and encephalopathy. Plan to go to IR on Wednesday 1/22/2025 for fluid drainage and drain placement     INTERVENTIONS  Implementation  Assessed PO intake/attempted education      Goals  Patient to consume % of nutritionally adequate meal trays TID, or the equivalent with supplements/snacks.    Monitoring/Evaluation  Progress toward goals will be monitored and evaluated per protocol.    Linda Harlye MS, RD, LD, [facility]    6C (beds 1663-6539) + 7C (beds 4008-1451) + ED   [facility] by name or unit dietitian

## 2025-01-21 NOTE — PLAN OF CARE
Problem: Cardiovascular Surgery  Goal: Improved Activity Tolerance  Outcome: Progressing  Intervention: Optimize Tolerance for Activity  Recent Flowsheet Documentation  Taken 1/21/2025 1200 by Kendrick Mcgowan, RN  Environmental Support: calm environment promoted  Taken 1/21/2025 0800 by Kendrick Mcgowan, RN  Environmental Support: calm environment promoted   Goal Outcome Evaluation:      Plan of Care Reviewed With: patient    Overall Patient Progress: no changeOverall Patient Progress: no change

## 2025-01-21 NOTE — PROGRESS NOTES
"Recheck of K at 3.3 (up from 3.1). Patient mood better this am and then called to room 1505 and patient was angry/aggressive telling this nurse to just \"listen to what I'm saying----I'm not taking any more potassium, they know that they are giving me too many things and its messing with my body. im not taking it and ill deal with the doctors.\"     Message sent to Sonia Schulz via [employer], no provider signed in at present.  "

## 2025-01-21 NOTE — PLAN OF CARE
Speech Language Therapy Discharge Summary    Reason for therapy discharge:    All goals and outcomes met, no further needs identified.    Progress towards therapy goal(s). See goals on Care Plan in [facility] electronic health record for goal details.  Goals met    Therapy recommendation(s):    No further therapy is recommended.    Recommend regular diet and thin liquids. Upright with PO as tolerate, slow rate, and single bites/sips. Please place partials prior to PO intake. No additional ST needs indicated; will complete orders.

## 2025-01-21 NOTE — PROGRESS NOTES
Care Management Follow Up    Additional information    1/21 - CHW delegated by KALEIGH, attempted to obtain TCU choices to send referrals to. Pt provided me the list explaining that he doesn't like any that are shown due to rating and/or location, and noted specifically he doesn't want to go anywhere [place]. He then went online to search for rehabilitation facilities, found [facility], and stated he wants to see what they say before looking for any other places. I offered to provide pt more choices and explained that we ideally send 4-5 initial referrals, he declined. After leaving bedside, I confirmed that [facility] offers ARU only, not TCU. SW notified and will attempt to provide education and obtain more choices. No further action taken.      Meri Ruelas  Community Health Worker  6A, 6B, 6C   [phone]  Fax [phone]

## 2025-01-22 ENCOUNTER — APPOINTMENT (OUTPATIENT)
Dept: PHYSICAL THERAPY | Facility: CLINIC | Age: 65
DRG: 003 | End: 2025-01-22
Payer: MEDICARE

## 2025-01-22 LAB
ANION GAP SERPL CALCULATED.3IONS-SCNC: 7 MMOL/L (ref 7–15)
BUN SERPL-MCNC: 7.8 MG/DL (ref 8–23)
CALCIUM SERPL-MCNC: 8.3 MG/DL (ref 8.8–10.4)
CHLORIDE SERPL-SCNC: 106 MMOL/L (ref 98–107)
CREAT SERPL-MCNC: 0.53 MG/DL (ref 0.67–1.17)
EGFRCR SERPLBLD CKD-EPI 2021: >90 ML/MIN/1.73M2
ERYTHROCYTE [DISTWIDTH] IN BLOOD BY AUTOMATED COUNT: 16.1 % (ref 10–15)
GLUCOSE BLDC GLUCOMTR-MCNC: 110 MG/DL (ref 70–99)
GLUCOSE BLDC GLUCOMTR-MCNC: 111 MG/DL (ref 70–99)
GLUCOSE BLDC GLUCOMTR-MCNC: 143 MG/DL (ref 70–99)
GLUCOSE BLDC GLUCOMTR-MCNC: 96 MG/DL (ref 70–99)
GLUCOSE SERPL-MCNC: 112 MG/DL (ref 70–99)
HCO3 SERPL-SCNC: 28 MMOL/L (ref 22–29)
HCT VFR BLD AUTO: 28.7 % (ref 40–53)
HGB BLD-MCNC: 8.9 G/DL (ref 13.3–17.7)
INR PPP: 1.37 (ref 0.85–1.15)
MAGNESIUM SERPL-MCNC: 1.8 MG/DL (ref 1.7–2.3)
MCH RBC QN AUTO: 29.9 PG (ref 26.5–33)
MCHC RBC AUTO-ENTMCNC: 31 G/DL (ref 31.5–36.5)
MCV RBC AUTO: 96 FL (ref 78–100)
PHOSPHATE SERPL-MCNC: 2.7 MG/DL (ref 2.5–4.5)
PLATELET # BLD AUTO: 261 10E3/UL (ref 150–450)
POTASSIUM SERPL-SCNC: 3.6 MMOL/L (ref 3.4–5.3)
POTASSIUM SERPL-SCNC: 3.6 MMOL/L (ref 3.4–5.3)
RBC # BLD AUTO: 2.98 10E6/UL (ref 4.4–5.9)
SODIUM SERPL-SCNC: 141 MMOL/L (ref 135–145)
WBC # BLD AUTO: 7.3 10E3/UL (ref 4–11)

## 2025-01-22 PROCEDURE — 250N000013 HC RX MED GY IP 250 OP 250 PS 637

## 2025-01-22 PROCEDURE — 85027 COMPLETE CBC AUTOMATED: CPT

## 2025-01-22 PROCEDURE — 83735 ASSAY OF MAGNESIUM: CPT | Performed by: STUDENT IN AN ORGANIZED HEALTH CARE EDUCATION/TRAINING PROGRAM

## 2025-01-22 PROCEDURE — 120N000003 HC R&B IMCU UMMC

## 2025-01-22 PROCEDURE — 250N000009 HC RX 250: Performed by: SURGERY

## 2025-01-22 PROCEDURE — 250N000013 HC RX MED GY IP 250 OP 250 PS 637: Performed by: SURGERY

## 2025-01-22 PROCEDURE — 250N000011 HC RX IP 250 OP 636

## 2025-01-22 PROCEDURE — 999N000157 HC STATISTIC RCP TIME EA 10 MIN

## 2025-01-22 PROCEDURE — 85610 PROTHROMBIN TIME: CPT | Performed by: PHYSICIAN ASSISTANT

## 2025-01-22 PROCEDURE — 250N000013 HC RX MED GY IP 250 OP 250 PS 637: Performed by: NURSE PRACTITIONER

## 2025-01-22 PROCEDURE — 250N000009 HC RX 250

## 2025-01-22 PROCEDURE — 97140 MANUAL THERAPY 1/> REGIONS: CPT | Mod: GP | Performed by: PHYSICAL THERAPIST

## 2025-01-22 PROCEDURE — 84100 ASSAY OF PHOSPHORUS: CPT | Performed by: STUDENT IN AN ORGANIZED HEALTH CARE EDUCATION/TRAINING PROGRAM

## 2025-01-22 PROCEDURE — 80048 BASIC METABOLIC PNL TOTAL CA: CPT

## 2025-01-22 PROCEDURE — 94640 AIRWAY INHALATION TREATMENT: CPT

## 2025-01-22 PROCEDURE — 250N000013 HC RX MED GY IP 250 OP 250 PS 637: Performed by: PHYSICIAN ASSISTANT

## 2025-01-22 PROCEDURE — 94668 MNPJ CHEST WALL SBSQ: CPT

## 2025-01-22 PROCEDURE — 94640 AIRWAY INHALATION TREATMENT: CPT | Mod: 76

## 2025-01-22 PROCEDURE — 250N000011 HC RX IP 250 OP 636: Performed by: PHYSICIAN ASSISTANT

## 2025-01-22 PROCEDURE — 97116 GAIT TRAINING THERAPY: CPT | Mod: GP | Performed by: PHYSICAL THERAPIST

## 2025-01-22 PROCEDURE — 97530 THERAPEUTIC ACTIVITIES: CPT | Mod: GP | Performed by: PHYSICAL THERAPIST

## 2025-01-22 PROCEDURE — 250N000011 HC RX IP 250 OP 636: Performed by: SURGERY

## 2025-01-22 PROCEDURE — 94799 UNLISTED PULMONARY SVC/PX: CPT

## 2025-01-22 PROCEDURE — 250N000013 HC RX MED GY IP 250 OP 250 PS 637: Performed by: STUDENT IN AN ORGANIZED HEALTH CARE EDUCATION/TRAINING PROGRAM

## 2025-01-22 RX ORDER — HYDROXYZINE HYDROCHLORIDE 25 MG/1
25 TABLET, FILM COATED ORAL EVERY 6 HOURS PRN
Status: DISCONTINUED | OUTPATIENT
Start: 2025-01-22 | End: 2025-01-25 | Stop reason: HOSPADM

## 2025-01-22 RX ORDER — TRIAMTERENE AND HYDROCHLOROTHIAZIDE 37.5; 25 MG/1; MG/1
1 TABLET ORAL DAILY
Status: DISCONTINUED | OUTPATIENT
Start: 2025-01-22 | End: 2025-01-25 | Stop reason: HOSPADM

## 2025-01-22 RX ORDER — POTASSIUM CHLORIDE 750 MG/1
20 TABLET, EXTENDED RELEASE ORAL ONCE
Status: COMPLETED | OUTPATIENT
Start: 2025-01-22 | End: 2025-01-22

## 2025-01-22 RX ORDER — DIPHENHYDRAMINE HYDROCHLORIDE, ZINC ACETATE 2; .1 G/100G; G/100G
CREAM TOPICAL 3 TIMES DAILY PRN
Status: DISCONTINUED | OUTPATIENT
Start: 2025-01-22 | End: 2025-01-23

## 2025-01-22 RX ORDER — HYDROXYZINE HYDROCHLORIDE 50 MG/1
50 TABLET, FILM COATED ORAL EVERY 6 HOURS PRN
Status: DISCONTINUED | OUTPATIENT
Start: 2025-01-22 | End: 2025-01-25 | Stop reason: HOSPADM

## 2025-01-22 RX ORDER — MAGNESIUM OXIDE 400 MG/1
400 TABLET ORAL EVERY 4 HOURS
Status: COMPLETED | OUTPATIENT
Start: 2025-01-22 | End: 2025-01-22

## 2025-01-22 RX ADMIN — MAGNESIUM OXIDE TAB 400 MG (241.3 MG ELEMENTAL MG) 400 MG: 400 (241.3 MG) TAB at 12:18

## 2025-01-22 RX ADMIN — ONDANSETRON 4 MG: 4 TABLET, ORALLY DISINTEGRATING ORAL at 05:58

## 2025-01-22 RX ADMIN — TRIAMTERENE AND HYDROCHLOROTHIAZIDE 1 TABLET: 37.5; 25 TABLET ORAL at 09:56

## 2025-01-22 RX ADMIN — PROCHLORPERAZINE EDISYLATE 5 MG: 5 INJECTION INTRAMUSCULAR; INTRAVENOUS at 22:22

## 2025-01-22 RX ADMIN — Medication 10 ML: at 12:18

## 2025-01-22 RX ADMIN — METOPROLOL TARTRATE 50 MG: 25 TABLET, FILM COATED ORAL at 08:09

## 2025-01-22 RX ADMIN — LIDOCAINE 4% 1 PATCH: 40 PATCH TOPICAL at 20:53

## 2025-01-22 RX ADMIN — METHOCARBAMOL 500 MG: 500 TABLET ORAL at 00:00

## 2025-01-22 RX ADMIN — MAGNESIUM OXIDE TAB 400 MG (241.3 MG ELEMENTAL MG) 400 MG: 400 (241.3 MG) TAB at 08:12

## 2025-01-22 RX ADMIN — ONDANSETRON 4 MG: 4 TABLET, ORALLY DISINTEGRATING ORAL at 12:18

## 2025-01-22 RX ADMIN — RAMELTEON 8 MG: 8 TABLET ORAL at 22:02

## 2025-01-22 RX ADMIN — THIAMINE HCL TAB 100 MG 100 MG: 100 TAB at 08:09

## 2025-01-22 RX ADMIN — METHOCARBAMOL 500 MG: 500 TABLET ORAL at 12:18

## 2025-01-22 RX ADMIN — ONDANSETRON 4 MG: 4 TABLET, ORALLY DISINTEGRATING ORAL at 18:38

## 2025-01-22 RX ADMIN — SODIUM CHLORIDE SOLN NEBU 3% 3 ML: 3 NEBU SOLN at 20:19

## 2025-01-22 RX ADMIN — THERA TABS 1 TABLET: TAB at 08:08

## 2025-01-22 RX ADMIN — LOSARTAN POTASSIUM 25 MG: 25 TABLET, FILM COATED ORAL at 08:09

## 2025-01-22 RX ADMIN — BACLOFEN 10 MG: 10 TABLET ORAL at 23:48

## 2025-01-22 RX ADMIN — GABAPENTIN 300 MG: 300 CAPSULE ORAL at 16:58

## 2025-01-22 RX ADMIN — BACLOFEN 10 MG: 10 TABLET ORAL at 08:09

## 2025-01-22 RX ADMIN — SODIUM CHLORIDE SOLN NEBU 3% 3 ML: 3 NEBU SOLN at 13:40

## 2025-01-22 RX ADMIN — IPRATROPIUM BROMIDE AND ALBUTEROL SULFATE 3 ML: .5; 3 SOLUTION RESPIRATORY (INHALATION) at 20:19

## 2025-01-22 RX ADMIN — METOPROLOL TARTRATE 50 MG: 25 TABLET, FILM COATED ORAL at 20:12

## 2025-01-22 RX ADMIN — OXYCODONE HYDROCHLORIDE 10 MG: 10 TABLET ORAL at 12:18

## 2025-01-22 RX ADMIN — GABAPENTIN 900 MG: 300 CAPSULE ORAL at 22:01

## 2025-01-22 RX ADMIN — Medication 10 ML: at 04:30

## 2025-01-22 RX ADMIN — OXYCODONE HYDROCHLORIDE 10 MG: 10 TABLET ORAL at 18:38

## 2025-01-22 RX ADMIN — BACLOFEN 10 MG: 10 TABLET ORAL at 16:58

## 2025-01-22 RX ADMIN — POTASSIUM & SODIUM PHOSPHATES POWDER PACK 280-160-250 MG 1 PACKET: 280-160-250 PACK at 23:48

## 2025-01-22 RX ADMIN — OXYCODONE HYDROCHLORIDE 10 MG: 10 TABLET ORAL at 05:58

## 2025-01-22 RX ADMIN — LOPERAMIDE HYDROCHLORIDE 2 MG: 2 CAPSULE ORAL at 12:18

## 2025-01-22 RX ADMIN — SERTRALINE HYDROCHLORIDE 200 MG: 100 TABLET ORAL at 08:09

## 2025-01-22 RX ADMIN — CALCIUM CARBONATE (ANTACID) CHEW TAB 500 MG 1000 MG: 500 CHEW TAB at 16:58

## 2025-01-22 RX ADMIN — LOPERAMIDE HYDROCHLORIDE 2 MG: 2 CAPSULE ORAL at 04:33

## 2025-01-22 RX ADMIN — ASPIRIN 81 MG CHEWABLE TABLET 81 MG: 81 TABLET CHEWABLE at 08:09

## 2025-01-22 RX ADMIN — HYDROXYZINE HYDROCHLORIDE 25 MG: 25 TABLET, FILM COATED ORAL at 22:22

## 2025-01-22 RX ADMIN — METHOCARBAMOL 500 MG: 500 TABLET ORAL at 18:43

## 2025-01-22 RX ADMIN — ROSUVASTATIN CALCIUM 40 MG: 20 TABLET, FILM COATED ORAL at 08:09

## 2025-01-22 RX ADMIN — POTASSIUM & SODIUM PHOSPHATES POWDER PACK 280-160-250 MG 1 PACKET: 280-160-250 PACK at 17:08

## 2025-01-22 ASSESSMENT — ACTIVITIES OF DAILY LIVING (ADL)
ADLS_ACUITY_SCORE: 57
ADLS_ACUITY_SCORE: 55
ADLS_ACUITY_SCORE: 57
ADLS_ACUITY_SCORE: 53
ADLS_ACUITY_SCORE: 57
ADLS_ACUITY_SCORE: 53
ADLS_ACUITY_SCORE: 53
ADLS_ACUITY_SCORE: 57
ADLS_ACUITY_SCORE: 57
ADLS_ACUITY_SCORE: 55
ADLS_ACUITY_SCORE: 57
ADLS_ACUITY_SCORE: 55
ADLS_ACUITY_SCORE: 57

## 2025-01-22 NOTE — PLAN OF CARE
"/70 (BP Location: Right arm)   Pulse 65   Temp 97.4  F (36.3  C) (Oral)   Resp 20   Ht 1.803 m (5' 11\")   Wt 83 kg (182 lb 15.7 oz)   SpO2 100%   BMI 25.52 kg/m    Shift: 1930 - 2300    Neuro: A&Ox4. Not following commands occasionally  Cardiac: SR. AVSS.   Respiratory: Sating 1L NC overnight. LS crackles bilaterally.   GI/: Rodriguez- good UO w/ pink/red urine. Stool incontinence x1. Denies nausea  Diet/appetite: NPO starting midnight.   Activity:  Assist of 2 w/ lift,   Pain: At acceptable level on current regimen.   Skin: No new deficits noted. R BKA.   LDA's: L PICC- SL, L PIV- SL    Plan: BS ACHS, IR consult tmr for pericardial drain. Continue with POC. Notify primary team with changes.      Problem: Adult Inpatient Plan of Care  Goal: Plan of Care Review  Description: The Plan of Care Review/Shift note should be completed every shift.  The Outcome Evaluation is a brief statement about your assessment that the patient is improving, declining, or no change.  This information will be displayed automatically on your shift  note.  Outcome: Progressing  Flowsheets (Taken 1/21/2025 2146)  Overall Patient Progress: no change  Goal: Patient-Specific Goal (Individualized)  Description: You can add care plan individualizations to a care plan. Examples of Individualization might be:  \"Parent requests to be called daily at 9am for status\", \"I have a hard time hearing out of my right ear\", or \"Do not touch me to wake me up as it startles  me\".  Outcome: Progressing  Goal: Absence of Hospital-Acquired Illness or Injury  Outcome: Progressing  Intervention: Identify and Manage Fall Risk  Recent Flowsheet Documentation  Taken 1/21/2025 2050 by Krys Haynes RN  Safety Promotion/Fall Prevention: safety round/check completed  Intervention: Prevent Skin Injury  Recent Flowsheet Documentation  Taken 1/21/2025 2050 by Krys Haynes, RN  Body Position: position changed independently  Intervention: Prevent and Manage VTE " (Venous Thromboembolism) Risk  Recent Flowsheet Documentation  Taken 1/21/2025 2050 by Krys Haynes RN  VTE Prevention/Management: SCDs off (sequential compression devices)  Intervention: Prevent Infection  Recent Flowsheet Documentation  Taken 1/21/2025 2050 by Krys Haynes RN  Infection Prevention:   cohorting utilized   environmental surveillance performed   hand hygiene promoted   rest/sleep promoted   single patient room provided  Goal: Optimal Comfort and Wellbeing  Outcome: Progressing  Intervention: Monitor Pain and Promote Comfort  Recent Flowsheet Documentation  Taken 1/21/2025 2050 by Krys Haynes RN  Pain Management Interventions:   medication (see MAR)   rest  Intervention: Provide Person-Centered Care  Recent Flowsheet Documentation  Taken 1/21/2025 2050 by Krys Haynes RN  Trust Relationship/Rapport:   care explained   choices provided   emotional support provided   empathic listening provided   questions answered   questions encouraged   reassurance provided   thoughts/feelings acknowledged  Goal: Readiness for Transition of Care  Outcome: Progressing     Problem: Comorbidity Management  Goal: Blood Glucose Levels Within Targeted Range  Outcome: Progressing  Intervention: Monitor and Manage Glycemia  Recent Flowsheet Documentation  Taken 1/21/2025 2050 by Krys Haynes RN  Medication Review/Management: medications reviewed  Goal: Blood Pressure in Desired Range  Outcome: Progressing  Intervention: Maintain Blood Pressure Management  Recent Flowsheet Documentation  Taken 1/21/2025 2050 by Krys Haynes RN  Medication Review/Management: medications reviewed     Problem: Risk for Delirium  Goal: Optimal Coping  Outcome: Progressing  Intervention: Optimize Psychosocial Adjustment to Delirium  Recent Flowsheet Documentation  Taken 1/21/2025 2050 by Krys Haynes RN  Supportive Measures: active listening utilized  Goal: Improved Behavioral Control  Outcome: Progressing  Intervention: Minimize Safety  Risk  Recent Flowsheet Documentation  Taken 1/21/2025 2050 by Krys Haynes RN  Enhanced Safety Measures:   pain management   patient/family teach back on injury risk  Trust Relationship/Rapport:   care explained   choices provided   emotional support provided   empathic listening provided   questions answered   questions encouraged   reassurance provided   thoughts/feelings acknowledged  Goal: Improved Attention and Thought Clarity  Outcome: Progressing  Intervention: Maximize Cognitive Function  Recent Flowsheet Documentation  Taken 1/21/2025 2050 by Krys Haynes RN  Reorientation Measures:   calendar in view   clock in view  Goal: Improved Sleep  Outcome: Progressing  Intervention: Promote Sleep  Recent Flowsheet Documentation  Taken 1/21/2025 2050 by Krys Haynes RN  Sleep/Rest Enhancement:   comfort measures   consistent schedule promoted     Problem: Cardiovascular Surgery  Goal: Improved Activity Tolerance  Outcome: Progressing  Intervention: Optimize Tolerance for Activity  Recent Flowsheet Documentation  Taken 1/21/2025 2050 by Krys Haynes RN  Environmental Support: calm environment promoted  Goal: Optimal Coping with Heart Surgery  Outcome: Progressing  Intervention: Support Psychosocial Response to Surgery  Recent Flowsheet Documentation  Taken 1/21/2025 2050 by Krys Haynes RN  Supportive Measures: active listening utilized  Goal: Absence of Bleeding  Outcome: Progressing  Goal: Effective Bowel Elimination  Outcome: Progressing  Goal: Effective Cardiac Function  Outcome: Progressing  Goal: Optimal Cerebral Tissue Perfusion  Outcome: Progressing  Intervention: Protect and Optimize Cerebral Perfusion  Recent Flowsheet Documentation  Taken 1/21/2025 2050 by Krys Haynes RN  Glycemic Management: blood glucose monitored  Head of Bed (HOB) Positioning: HOB at 30-45 degrees  Goal: Fluid and Electrolyte Balance  Outcome: Progressing  Intervention: Monitor and Manage Fluid and Electrolyte Balance  Recent  Flowsheet Documentation  Taken 1/21/2025 2050 by Krys Haynes RN  Fluid/Electrolyte Management: fluids provided  Goal: Blood Glucose Level Within Targeted Range  Outcome: Progressing  Intervention: Optimize Glycemic Control  Recent Flowsheet Documentation  Taken 1/21/2025 2050 by Krys Haynes RN  Glycemic Management: blood glucose monitored  Goal: Absence of Infection Signs and Symptoms  Outcome: Progressing  Intervention: Prevent or Manage Infection  Recent Flowsheet Documentation  Taken 1/21/2025 2050 by Krys Haynes RN  Infection Prevention:   cohorting utilized   environmental surveillance performed   hand hygiene promoted   rest/sleep promoted   single patient room provided  Goal: Acceptable Pain Control  Outcome: Progressing  Intervention: Prevent or Manage Pain  Recent Flowsheet Documentation  Taken 1/21/2025 2050 by Krys Haynes RN  Pain Management Interventions:   medication (see MAR)   rest  Goal: Nausea and Vomiting Relief  Outcome: Progressing  Goal: Effective Urinary Elimination  Outcome: Progressing  Goal: Effective Oxygenation and Ventilation  Outcome: Progressing  Intervention: Promote Airway Secretion Clearance  Recent Flowsheet Documentation  Taken 1/21/2025 2050 by Krys Haynes RN  Cough And Deep Breathing: done independently per patient  Airway/Ventilation Management: pulmonary hygiene promoted           Goal Outcome Evaluation:           Overall Patient Progress: no changeOverall Patient Progress: no change

## 2025-01-22 NOTE — PROVIDER NOTIFICATION
Surgery night paged that patient is refusing potassium replacement protocol at this time. His K was replaced at 20:00 for a level of 3.3. Re-check K at 23:00 is 3.6. Based on electrolyte replacement orders, pt is to receive more potassium. He is refusing any more potassium replacements until the AM labs on 1/22 are drawn at 0400.    0546: Surgery cross cover paged that pt's potassium is 3.6 and that he is again refusing any replacements. MD acknowledged and will pass to day team.    0618: Surgery cross paged that patient is refusing all electrolyte replacements, not just potassium.

## 2025-01-22 NOTE — PROGRESS NOTES
1800  No longer this patients nurse. Circulating nurse assuming care and will discuss trying to give IV K runs. Spoke with pharmacy, added back to MAR.

## 2025-01-22 NOTE — PLAN OF CARE
"/77 (BP Location: Right arm)   Pulse 67   Temp 97.4  F (36.3  C) (Oral)   Resp 20   Ht 1.803 m (5' 11\")   Wt 81.5 kg (179 lb 10.8 oz)   SpO2 99%   BMI 25.06 kg/m      VSS. Afebrile. 1L NC on for comfort. Alert and oriented x 4. PRN Robaxin and oxy given once for pain. NPO for IR today for pericardial drain placement. PRN zofran given twice for nausea with relief. Adequate UOP per ca. Incontinent of 1 small BM overnight. Pt requests to wear a brief in bed. Declining repositioning q 2 hours in bed. Continue to monitor.    See previous MD notifications regarding patient's refusal of electrolyte replacements.  "

## 2025-01-22 NOTE — PROGRESS NOTES
CVTS    Patient refusing pericardial drain placement today and requesting to eat at 3pm. Patient frustrated and states he is diabetic and can't wait any longer to eat. I did notify patient that he was next to be brought down for procedure. Patient still refuses and wants a diet. I also discussed with him symptoms of worsening effusion and possibility of emergent situation the longer we wait to drain effusion. Patient accepts risk. IR notified and will reschedule for tomorrow. Patient made NPO after midnight. Patient is currently HDS with HR 70's and asymptomatic. Discussed with nursing to monitor for signs of SOB, tachycardia, and hypotension and to contact provider if these should occur.       Sonia Schulz PA-C  Cardiothoracic Surgery  Pager [phone]  January 22, 2025

## 2025-01-22 NOTE — PROGRESS NOTES
CVTS Progress Note  01/22/2025         Assessment and Plan:     Erwin Aguilar is a 64 year old male with a past medical history of DM2, HTN, PVD, and neuropathic pain who initially presented on 12/31/24 to the ED with arm pain, vomiting, and diarrhea; ACS and ST depression. He subsequently had VT arrest with 1 round of CPR, epinephrine, defibrillation. After ROSC was found to be in afib with RVR. Became hypotensive and was subsequently cannulated for VA ECMO. Found to have multivessel disease s/p CABG x4 on 1/2/25 with Dr. Rosenbaum. ECMO decannulated on 1/3/25 at bedside. ICU course complicated by large R PTX seen on chest CT on 1/4/25 s/p bedside CT placement, VAP, and encephalopathy. Extubated 1/11/25.       Cardiovascular:   Hx of CAD - s/p CABG x4 with Dr. Rosenbaum   Mixed shock - cardiogenic, vasoplegic, resolved   HTN  Pre-op VT Cardiac Arrest on VA ECMO (1/1 - 1/3)  HLD  Prolonged Qtc, improved, QTc now 492   Hx PAF on DOAC PTA - s/p MAZE & LAAC  No arrhythmias overnight, HD stable, in NSR.   Post operative ECHO 1/5 with LVEF 55-60%, normal RV function, no pericardial effusion   1/17 Repeat echo in setting of therapeutic AC showed LVEF 60-65%, small, loculated pericardial effusion without HD compromise  1/21 Echo showed EF 55-60%, large pericardial effusion without HD compromise, and RA pressure of 8 mmHg  -  mg daily  - Rosuvastatin 40 mg daily   - Metoprolol tartrate to 50 mg BID   - Losartan 25mg daily  - Diuresis as below  - AC as below    Chest tubes: Removed in ICU   TPW: Removed in ICU     Pulmonary:  Acute Hypoxic Respiratory failure, resolved   Respiratory alkalosis 2:2 agitation - resolved   R tension pneumothorax, resolved   VAP  SubQ emphysema, improving  Right hemidiaphragm elevation  Bilateral pleural effusions, small & asymptomatic  Extubated POD 9. Now saturating well on RA.  - Supplemental O2 PRN to keep sats > 92%  - Pulm hygiene, IS, OOB/activity and deep breathing  - ABX detailed below    - PRN Mucomyst and Duoneb  - QID 3% saline nebs + Albuterol TID  - Chest physiotherapy per patient request, stated it was helpful with mobilizing secretions TID  - Scheduled and prn albuterol nebs (PTA albuterol inhaler PRN)    Neurology:  Acute post-operative pain, improved  Encephalopathy, improved   Delirium   Anxiety   Chronic pain with opioid dependence  Neuropathy, PTA  Insomnia   Multimodal analgesia regimen:  - Acetaminophen PRN  - Lidocaine patches  - Modest dose Robaxin PRN in setting of improving delirium  - PRN hydroxyzine discontinued in s/o delirium   - PO oxycodone PRN frequency reduced (PTA 10mg TID PRN)  - PTA baclofen   - 1/1 vEEG without seizure activity (severe encephalopathy) and 12/31, 1/4, and 1/7 CTH negative for acute findings.   - PTA Sertraline  - Psych consulted for encephalopathy/delirium recs in s/o prolonged QTc; appreciate assistance  - Ramelteon for sleep   - PTA gabapentin resumed at reduced dose, look to titrate to PTA dosing in the next couple of days - HS dose increased to 900mg PTA dose 1/17  - Abilify BID for a time until encephalopathy improves before tapering to discontinuation. Started taper 1/19/24, with 2 mg daily for 3 days then stop. Now completed.   - Delirium precautions; sitter discontinued 1/16     / Renal / Fluid / Electrolytes:  Hypernatremia, resolved   AUSTIN/ATN, resolved  Hypervolemia, improving  Hypokalemia, 2/2 diuresis   BL creat ~ 0.6-0.8, creatinine at baseline; adequate UOP with multiple unmeasured voids. Only Bed Weights recorded. Prosthesis now fitting, can get standing weight for baseline. Patient reports home weight around 180-182 lbs.   - Diuresis: Bumex 2 mg PO daily. Stop today, patient does appear euvolemic and is refusing.   - Stop supplemental K and replace only per protocol-patient also refusing.   - Replete lytes per protocol  - Strict I/O, daily weights  - Avoid/limit nephrotoxins as able  - Lymphedema following-edema significantly improved.    - Resume patients Triamterene/hydrochlorothiazide 37.5/25 mg     Urinary retention  Appreciate urology consult 1/19  Continue flomax-patient refusing.   Remove ca catheter, follow post void protocol.   Plan once post void residual amount establish:  Frequency of SIC can be determined based on the average post-void residual:  If PVR < 150cc - no cathing needed  If PVR is 150-250cc - cath bid (qam & qhs)  If PVR is 251-350cc - cath tid  If PVR is 351-450cc - cath qid  If PVR is > 450cc - cath every 4 hours  Patient can either be managed with a indwelling ca catheter vs clean intermittent catheterization until seen in f/u in urology clinic  If CIC is chosen, then the patient will need nursing instruction on proper self-intermittent catheterization technique.  Remind patient that CIC should be performed ONLY after an attempt at spontaneous voiding is made  The patient will need to keep a journal of his cathing regimen after discharge (will need to include frequency of cathing and post-void residual amount obtained from CIC) and bring this to clinic for review   If patient unwilling or unable to perform CIC, may leave ca catheter in place   Patient will need to f/u in urology clinic approximately 1 week after discharge for a trial of void and discussion of bladder emptying issues    GI / Nutrition:   Dysphagia   Severe malnutrition in the context of acute on chronic illness   Liquid stools   Orders Placed This Encounter      NPO per Anesthesia Guidelines for Procedure/Surgery Except for: Meds  - Dietitian consulted and following, appreciate assistance  - PRN bowel regimen  - PRN Imodium 2 mg QID PRN     Endocrine:  Stress-induced hyperglycemia, resolved  Type 2 diabetes mellitus   Hgb A1c 5.7  - Initially managed on insulin drip postop, transitioned to sliding scale & Lantus  - Goal BG <180 for optimal healing  - Lantus 20 bedtime (PTA dose 26 unit at bedtime)     Infectious Disease:  Stress-induced  leukocytosis, resolved  Sternal wound infection  Aspiration PNA: Klebsiella pneumoniae   WBC 7.3, afebrile  - Completed perioperative antibiotics  - Continue to monitor fever curve, CBC  - 1/10 C. Diff neg  - 1/14 infectious work-up for worsening leukocytosis negative thus far; follow for final results   - 1/14 BLE US neg for DVT  - 1/15 2-view CXR with low lung volumes, small bilateral pleural effusions but too small for drainage  - Defer respiratory viral panel given stable on RA, afebrile  - Klebsiella pneumoniae from sputum cultures on 12/31 and 1/9. Completed course of Zosyn from 1/9-1/20.   - Zosyn and vancomycin were restarted 1/9 for fever. Sputum culture again grew Klebsiella pneumoniae.   - 1/11 noted to have small puss drainage from sternotomy    - CT chest on 1/12/25, not concerning for deep infection  - CT CAP 1/17 to evaluate abd pain, continued unexplained leukocytosis. Demonstrated large substernal fluid collection.   - Plan to go to IR on Wednesday 1/22/2025 for fluid drainage and drain placement   - Hold Apixaban   - Discontinue Vanc &Zosyn per Dr. Rosenbaum, and monitor off abx  Off all antibiotics since 1/20.      Hematology:   Acute blood loss anemia  Thrombocytopenia, resolved  Thrombocytosis, likely reactive, resolved   Therapeutic AC for PAF  Hgb stable; Plt WNL, no signs or symptoms of active bleeding  - CBC daily    Anticoagulation:   - ASA 81 mg   - PTA apixaban resumed for hx of PAF now s/p MAZE and LAAC; defer long-term AC plan to OP Cardiology, Holding 1/20/2025 per Dr. Rosenbaum in the setting of growing substernal fluid collection     MSK/Skin:  Sternotomy  Surgical incisions  Hx RLE BKA  - Sternal precautions  - Incisional cares per protocol  - Right groin decannulation wound dehisced at apex with staple removal, packing wet to dry BID    Prophylaxis:   - Stress ulcer prophylaxis: Pantoprazole 40 mg daily for 30 days  - DVT prophylaxis: Holding AC with substernal fluid collection,  "OOB/ambulation, SCD    Disposition:   - Transferred to    - Therapies recommending discharge to TCU   - IR tomorrow for pericardial drain for large pericardial effusion. Discharge 3-5 days.     Seen with Dr. Robi Schulz PA-C  Cardiothoracic Surgery  Pager [phone]  2025          Interval History:     Patient very disagreeable. Has been refusing medications per nursing. Does not want any more diuretics or potassium. Says he feels great and does not want to talk about medications. Reluctantly let me continue with interview and visit today.   Mentation at baseline.   Denies SOB. Appetite good. +BM. No chest pain. Surgical pain under control.   Denies sternal popping/clicking.         Physical Exam:     Gen: NAD, resting in bed, calm, cooperative on exam, conversant  Neuro: alert & grossly oriented, no focal deficits.  CV: RRR on monitor - SR  Pulm: unlabored breathing on RA  Abd:  Non-tender. +BM  Ext: trace LE edema left leg, improved swelling in right BKA.   Sternal incision intact, no erythema or induration, sternum stable, Sternum dry today with no drainage. Dermatitis surrounding incision.   R groin wound packed, dressing c/d/I. No erythema.   Tubes/drain sites: CYRIL, scabbed over, no erythema or induration         Data:    BP (!) 157/80 (BP Location: Right arm)   Pulse 80   Temp 97.4  F (36.3  C) (Oral)   Resp 20   Ht 1.803 m (5' 11\")   Wt 81.5 kg (179 lb 10.8 oz)   SpO2 96%   BMI 25.06 kg/m      Vitals:    25 0300 25 0657 25 0306   Weight: 82 kg (180 lb 12.4 oz) 83 kg (182 lb 15.7 oz) 81.5 kg (179 lb 10.8 oz)       Imaging:     Alessio Miramontes MD  [phone] 2025      Result Text  [id number]  [id number]  [id number]  [id number]^[email or web address]^ARLIN     [facility]  Echocardiography Laboratory  [address] [place] [id number]     Name: SUSAN CHENG  MRN: [id number]  : 1960  Study Date: " 01/20/2025 08:55 AM  Age: 64 yrs  Gender: Male  Patient Location: [facility]  Reason For Study: Pericardial Effusion  Ordering Physician: DELPHINE HERNANDEZ  Performed By: [facility]     BSA: 2.0 m2  Height: 71 in  Weight: 180 lb  HR: 75  BP: 139/77 mmHg  ______________________________________________________________________________  Procedure  Limited Echocardiogram with two-dimensional, color and spectral Doppler. The  final echo results were communicated to Aaron Hernandez.  ______________________________________________________________________________  Interpretation Summary  Large, loculated pericardial effusion anterior to the right ventricle without  hemodynamic compromise. Slightly larger than observed on the 1/17/25 study.  ______________________________________________________________________________  Left Ventricle  Left ventricular function is normal.The ejection fraction is 55-60%.     Right Ventricle  The right ventricle is normal size. Global right ventricular function is  normal.     Mitral Valve  The mitral valve is normal.     Tricuspid Valve  The tricuspid valve is normal.     Vessels  IVC diameter and respiratory changes fall into an intermediate range  suggesting an RA pressure of 8 mmHg.     Pericardium  Large, loculated pericardial effusion anterior to the right ventricle without  hemodynamic compromise. Slightly larger than observed on the 1/17/25 study.     Miscellaneous  A left pleural effusion is present.  ______________________________________________________________________________  Report approved by: Alessio Marte MD on 01/20/2025 09:41 AM     ____________________________________________________________________                 Labs:  Recent Labs   Lab 01/22/25  0823 01/22/25  0413 01/21/25  2253 01/21/25  2200 01/21/25  1714 01/21/25  1335 01/21/25  0833 01/21/25  0417 01/20/25  1124 01/20/25  0449   WBC  --  7.3  --   --   --   --   --  6.2  --  6.8   HGB  --  8.9*  --   --   --    --   --  9.0*  --  8.7*   MCV  --  96  --   --   --   --   --  94  --  96   PLT  --  261  --   --   --   --   --  282  --  275   INR  --  1.37*  --   --   --   --   --   --   --   --    NA  --  141  --   --   --   --   --  140  --  138   POTASSIUM  --  3.6  3.6 3.6  --   --  3.3*  --  3.1*  --  3.7   CHLORIDE  --  106  --   --   --   --   --  105  --  106   CO2  --  28  --   --   --   --   --  26  --  24   BUN  --  7.8*  --   --   --   --   --  7.5*  --  9.8   CR  --  0.53*  --   --   --   --   --  0.52*  --  0.56*   ANIONGAP  --  7  --   --   --   --   --  9  --  8   [place]  --  8.3*  --   --   --   --   --  8.2*  --  8.6*   * 112*  --  117*   < >  --    < > 122*   < > 107*    < > = values in this interval not displayed.      GLUCOSE:   Recent Labs   Lab 01/22/25  0823 01/22/25  0413 01/21/25  2200 01/21/25  1714 01/21/25  1110 01/21/25  0833   * 112* 117* 169* 138* 143*

## 2025-01-23 ENCOUNTER — APPOINTMENT (OUTPATIENT)
Dept: INTERVENTIONAL RADIOLOGY/VASCULAR | Facility: CLINIC | Age: 65
DRG: 003 | End: 2025-01-23
Attending: PHYSICIAN ASSISTANT
Payer: MEDICARE

## 2025-01-23 ENCOUNTER — APPOINTMENT (OUTPATIENT)
Dept: PHYSICAL THERAPY | Facility: CLINIC | Age: 65
DRG: 003 | End: 2025-01-23
Payer: MEDICARE

## 2025-01-23 LAB
ANION GAP SERPL CALCULATED.3IONS-SCNC: 9 MMOL/L (ref 7–15)
BUN SERPL-MCNC: 7.9 MG/DL (ref 8–23)
CALCIUM SERPL-MCNC: 8.9 MG/DL (ref 8.8–10.4)
CHLORIDE SERPL-SCNC: 104 MMOL/L (ref 98–107)
CREAT SERPL-MCNC: 0.71 MG/DL (ref 0.67–1.17)
EGFRCR SERPLBLD CKD-EPI 2021: >90 ML/MIN/1.73M2
ERYTHROCYTE [DISTWIDTH] IN BLOOD BY AUTOMATED COUNT: 16 % (ref 10–15)
GLUCOSE BLDC GLUCOMTR-MCNC: 126 MG/DL (ref 70–99)
GLUCOSE BLDC GLUCOMTR-MCNC: 134 MG/DL (ref 70–99)
GLUCOSE BLDC GLUCOMTR-MCNC: 190 MG/DL (ref 70–99)
GLUCOSE SERPL-MCNC: 116 MG/DL (ref 70–99)
HCO3 SERPL-SCNC: 26 MMOL/L (ref 22–29)
HCT VFR BLD AUTO: 31 % (ref 40–53)
HGB BLD-MCNC: 9.4 G/DL (ref 13.3–17.7)
MAGNESIUM SERPL-MCNC: 1.8 MG/DL (ref 1.7–2.3)
MCH RBC QN AUTO: 29.3 PG (ref 26.5–33)
MCHC RBC AUTO-ENTMCNC: 30.3 G/DL (ref 31.5–36.5)
MCV RBC AUTO: 97 FL (ref 78–100)
PHOSPHATE SERPL-MCNC: 3.9 MG/DL (ref 2.5–4.5)
PLATELET # BLD AUTO: 292 10E3/UL (ref 150–450)
POTASSIUM SERPL-SCNC: 3.9 MMOL/L (ref 3.4–5.3)
RBC # BLD AUTO: 3.21 10E6/UL (ref 4.4–5.9)
SODIUM SERPL-SCNC: 139 MMOL/L (ref 135–145)
WBC # BLD AUTO: 8.3 10E3/UL (ref 4–11)

## 2025-01-23 PROCEDURE — 76942 ECHO GUIDE FOR BIOPSY: CPT | Mod: 26 | Performed by: PHYSICIAN ASSISTANT

## 2025-01-23 PROCEDURE — 250N000013 HC RX MED GY IP 250 OP 250 PS 637: Performed by: NURSE PRACTITIONER

## 2025-01-23 PROCEDURE — 250N000013 HC RX MED GY IP 250 OP 250 PS 637

## 2025-01-23 PROCEDURE — 83735 ASSAY OF MAGNESIUM: CPT | Performed by: STUDENT IN AN ORGANIZED HEALTH CARE EDUCATION/TRAINING PROGRAM

## 2025-01-23 PROCEDURE — 10160 PNXR ASPIR ABSC HMTMA BULLA: CPT | Performed by: PHYSICIAN ASSISTANT

## 2025-01-23 PROCEDURE — 250N000011 HC RX IP 250 OP 636: Performed by: NURSE PRACTITIONER

## 2025-01-23 PROCEDURE — 250N000011 HC RX IP 250 OP 636: Mod: JZ | Performed by: PHYSICIAN ASSISTANT

## 2025-01-23 PROCEDURE — 80048 BASIC METABOLIC PNL TOTAL CA: CPT

## 2025-01-23 PROCEDURE — 0WJD3ZZ INSPECTION OF PERICARDIAL CAVITY, PERCUTANEOUS APPROACH: ICD-10-PCS | Performed by: PHYSICIAN ASSISTANT

## 2025-01-23 PROCEDURE — C1769 GUIDE WIRE: HCPCS

## 2025-01-23 PROCEDURE — 250N000011 HC RX IP 250 OP 636: Performed by: PHYSICIAN ASSISTANT

## 2025-01-23 PROCEDURE — 999N000157 HC STATISTIC RCP TIME EA 10 MIN

## 2025-01-23 PROCEDURE — 85027 COMPLETE CBC AUTOMATED: CPT

## 2025-01-23 PROCEDURE — 250N000013 HC RX MED GY IP 250 OP 250 PS 637: Performed by: STUDENT IN AN ORGANIZED HEALTH CARE EDUCATION/TRAINING PROGRAM

## 2025-01-23 PROCEDURE — 250N000011 HC RX IP 250 OP 636: Performed by: STUDENT IN AN ORGANIZED HEALTH CARE EDUCATION/TRAINING PROGRAM

## 2025-01-23 PROCEDURE — 99153 MOD SED SAME PHYS/QHP EA: CPT

## 2025-01-23 PROCEDURE — 97140 MANUAL THERAPY 1/> REGIONS: CPT | Mod: GP | Performed by: PHYSICAL THERAPIST

## 2025-01-23 PROCEDURE — 250N000011 HC RX IP 250 OP 636: Performed by: SURGERY

## 2025-01-23 PROCEDURE — 250N000013 HC RX MED GY IP 250 OP 250 PS 637: Performed by: PHYSICIAN ASSISTANT

## 2025-01-23 PROCEDURE — 99152 MOD SED SAME PHYS/QHP 5/>YRS: CPT

## 2025-01-23 PROCEDURE — 84100 ASSAY OF PHOSPHORUS: CPT | Performed by: STUDENT IN AN ORGANIZED HEALTH CARE EDUCATION/TRAINING PROGRAM

## 2025-01-23 PROCEDURE — 99152 MOD SED SAME PHYS/QHP 5/>YRS: CPT | Performed by: PHYSICIAN ASSISTANT

## 2025-01-23 PROCEDURE — C1729 CATH, DRAINAGE: HCPCS

## 2025-01-23 PROCEDURE — 272N000500 HC NEEDLE CR2

## 2025-01-23 PROCEDURE — 999N000044 HC STATISTIC CVC DRESSING CHANGE

## 2025-01-23 PROCEDURE — 250N000009 HC RX 250: Performed by: PHYSICIAN ASSISTANT

## 2025-01-23 PROCEDURE — 94640 AIRWAY INHALATION TREATMENT: CPT

## 2025-01-23 PROCEDURE — 77002 NEEDLE LOCALIZATION BY XRAY: CPT

## 2025-01-23 PROCEDURE — 82310 ASSAY OF CALCIUM: CPT

## 2025-01-23 PROCEDURE — 97530 THERAPEUTIC ACTIVITIES: CPT | Mod: GP | Performed by: PHYSICAL THERAPIST

## 2025-01-23 PROCEDURE — 250N000013 HC RX MED GY IP 250 OP 250 PS 637: Performed by: SURGERY

## 2025-01-23 PROCEDURE — 250N000009 HC RX 250: Performed by: NURSE PRACTITIONER

## 2025-01-23 PROCEDURE — 120N000003 HC R&B IMCU UMMC

## 2025-01-23 RX ORDER — NALOXONE HYDROCHLORIDE 0.4 MG/ML
0.2 INJECTION, SOLUTION INTRAMUSCULAR; INTRAVENOUS; SUBCUTANEOUS
Status: DISCONTINUED | OUTPATIENT
Start: 2025-01-23 | End: 2025-01-23

## 2025-01-23 RX ORDER — SODIUM CHLORIDE FOR INHALATION 3 %
3 VIAL, NEBULIZER (ML) INHALATION 2 TIMES DAILY PRN
Status: DISCONTINUED | OUTPATIENT
Start: 2025-01-23 | End: 2025-01-25 | Stop reason: HOSPADM

## 2025-01-23 RX ORDER — NALOXONE HYDROCHLORIDE 0.4 MG/ML
0.4 INJECTION, SOLUTION INTRAMUSCULAR; INTRAVENOUS; SUBCUTANEOUS
Status: DISCONTINUED | OUTPATIENT
Start: 2025-01-23 | End: 2025-01-23

## 2025-01-23 RX ORDER — MAGNESIUM OXIDE 400 MG/1
400 TABLET ORAL EVERY 4 HOURS
Status: DISCONTINUED | OUTPATIENT
Start: 2025-01-23 | End: 2025-01-23

## 2025-01-23 RX ORDER — IPRATROPIUM BROMIDE AND ALBUTEROL SULFATE 2.5; .5 MG/3ML; MG/3ML
3 SOLUTION RESPIRATORY (INHALATION) EVERY 6 HOURS PRN
Status: DISCONTINUED | OUTPATIENT
Start: 2025-01-23 | End: 2025-01-25 | Stop reason: HOSPADM

## 2025-01-23 RX ORDER — CEFAZOLIN SODIUM 2 G/100ML
2 INJECTION, SOLUTION INTRAVENOUS
Status: COMPLETED | OUTPATIENT
Start: 2025-01-23 | End: 2025-01-23

## 2025-01-23 RX ORDER — HEPARIN SODIUM 5000 [USP'U]/.5ML
5000 INJECTION, SOLUTION INTRAVENOUS; SUBCUTANEOUS EVERY 8 HOURS
Status: DISCONTINUED | OUTPATIENT
Start: 2025-01-23 | End: 2025-01-25 | Stop reason: HOSPADM

## 2025-01-23 RX ORDER — FLUMAZENIL 0.1 MG/ML
0.2 INJECTION, SOLUTION INTRAVENOUS
Status: DISCONTINUED | OUTPATIENT
Start: 2025-01-23 | End: 2025-01-23

## 2025-01-23 RX ORDER — FENTANYL CITRATE 50 UG/ML
25-50 INJECTION, SOLUTION INTRAMUSCULAR; INTRAVENOUS EVERY 5 MIN PRN
Status: DISCONTINUED | OUTPATIENT
Start: 2025-01-23 | End: 2025-01-23

## 2025-01-23 RX ORDER — MAGNESIUM SULFATE HEPTAHYDRATE 40 MG/ML
2 INJECTION, SOLUTION INTRAVENOUS ONCE
Status: COMPLETED | OUTPATIENT
Start: 2025-01-23 | End: 2025-01-23

## 2025-01-23 RX ORDER — ALBUTEROL SULFATE 0.83 MG/ML
2.5 SOLUTION RESPIRATORY (INHALATION) 2 TIMES DAILY PRN
Status: DISCONTINUED | OUTPATIENT
Start: 2025-01-23 | End: 2025-01-25 | Stop reason: HOSPADM

## 2025-01-23 RX ADMIN — OXYCODONE HYDROCHLORIDE 10 MG: 10 TABLET ORAL at 17:52

## 2025-01-23 RX ADMIN — TRIAMTERENE AND HYDROCHLOROTHIAZIDE 1 TABLET: 37.5; 25 TABLET ORAL at 07:52

## 2025-01-23 RX ADMIN — RAMELTEON 8 MG: 8 TABLET ORAL at 21:23

## 2025-01-23 RX ADMIN — SERTRALINE HYDROCHLORIDE 200 MG: 100 TABLET ORAL at 11:18

## 2025-01-23 RX ADMIN — THERA TABS 1 TABLET: TAB at 11:18

## 2025-01-23 RX ADMIN — MAGNESIUM SULFATE HEPTAHYDRATE 2 G: 40 INJECTION, SOLUTION INTRAVENOUS at 11:32

## 2025-01-23 RX ADMIN — LIDOCAINE HYDROCHLORIDE 9 ML: 10 INJECTION, SOLUTION EPIDURAL; INFILTRATION; INTRACAUDAL; PERINEURAL at 10:06

## 2025-01-23 RX ADMIN — BACLOFEN 10 MG: 10 TABLET ORAL at 07:52

## 2025-01-23 RX ADMIN — METHOCARBAMOL 500 MG: 500 TABLET ORAL at 11:19

## 2025-01-23 RX ADMIN — METOPROLOL TARTRATE 50 MG: 25 TABLET, FILM COATED ORAL at 07:52

## 2025-01-23 RX ADMIN — ALBUTEROL SULFATE 2.5 MG: 2.5 SOLUTION RESPIRATORY (INHALATION) at 20:53

## 2025-01-23 RX ADMIN — MIDAZOLAM 0.5 MG: 1 INJECTION INTRAMUSCULAR; INTRAVENOUS at 10:03

## 2025-01-23 RX ADMIN — LOSARTAN POTASSIUM 25 MG: 25 TABLET, FILM COATED ORAL at 07:52

## 2025-01-23 RX ADMIN — ONDANSETRON 4 MG: 4 TABLET, ORALLY DISINTEGRATING ORAL at 23:30

## 2025-01-23 RX ADMIN — BACLOFEN 10 MG: 10 TABLET ORAL at 16:45

## 2025-01-23 RX ADMIN — ASPIRIN 81 MG CHEWABLE TABLET 81 MG: 81 TABLET CHEWABLE at 11:19

## 2025-01-23 RX ADMIN — OXYCODONE HYDROCHLORIDE 10 MG: 10 TABLET ORAL at 00:52

## 2025-01-23 RX ADMIN — METOPROLOL TARTRATE 50 MG: 25 TABLET, FILM COATED ORAL at 20:39

## 2025-01-23 RX ADMIN — ONDANSETRON 4 MG: 4 TABLET, ORALLY DISINTEGRATING ORAL at 17:52

## 2025-01-23 RX ADMIN — HEPARIN SODIUM 5000 UNITS: 5000 INJECTION, SOLUTION INTRAVENOUS; SUBCUTANEOUS at 21:23

## 2025-01-23 RX ADMIN — GABAPENTIN 300 MG: 300 CAPSULE ORAL at 16:44

## 2025-01-23 RX ADMIN — OXYCODONE HYDROCHLORIDE 10 MG: 10 TABLET ORAL at 23:23

## 2025-01-23 RX ADMIN — HYDROXYZINE HYDROCHLORIDE 25 MG: 25 TABLET, FILM COATED ORAL at 07:52

## 2025-01-23 RX ADMIN — BACLOFEN 10 MG: 10 TABLET ORAL at 23:23

## 2025-01-23 RX ADMIN — THIAMINE HCL TAB 100 MG 100 MG: 100 TAB at 11:19

## 2025-01-23 RX ADMIN — HEPARIN SODIUM 5000 UNITS: 5000 INJECTION, SOLUTION INTRAVENOUS; SUBCUTANEOUS at 14:04

## 2025-01-23 RX ADMIN — CALCIUM CARBONATE (ANTACID) CHEW TAB 500 MG 1000 MG: 500 CHEW TAB at 11:19

## 2025-01-23 RX ADMIN — OXYCODONE HYDROCHLORIDE 10 MG: 10 TABLET ORAL at 11:19

## 2025-01-23 RX ADMIN — ROSUVASTATIN CALCIUM 40 MG: 20 TABLET, FILM COATED ORAL at 11:18

## 2025-01-23 RX ADMIN — GABAPENTIN 900 MG: 300 CAPSULE ORAL at 21:23

## 2025-01-23 RX ADMIN — METHOCARBAMOL 500 MG: 500 TABLET ORAL at 17:52

## 2025-01-23 RX ADMIN — FENTANYL CITRATE 50 MCG: 50 INJECTION, SOLUTION INTRAMUSCULAR; INTRAVENOUS at 09:53

## 2025-01-23 RX ADMIN — ONDANSETRON 4 MG: 4 TABLET, ORALLY DISINTEGRATING ORAL at 11:19

## 2025-01-23 RX ADMIN — CEFAZOLIN SODIUM 2 G: 2 INJECTION, SOLUTION INTRAVENOUS at 09:42

## 2025-01-23 RX ADMIN — TAMSULOSIN HYDROCHLORIDE 0.4 MG: 0.4 CAPSULE ORAL at 11:19

## 2025-01-23 RX ADMIN — Medication 10 ML: at 12:31

## 2025-01-23 RX ADMIN — CALCIUM CARBONATE (ANTACID) CHEW TAB 500 MG 1000 MG: 500 CHEW TAB at 16:45

## 2025-01-23 ASSESSMENT — ACTIVITIES OF DAILY LIVING (ADL)
ADLS_ACUITY_SCORE: 55
ADLS_ACUITY_SCORE: 51
ADLS_ACUITY_SCORE: 55
ADLS_ACUITY_SCORE: 48
ADLS_ACUITY_SCORE: 55
ADLS_ACUITY_SCORE: 48
ADLS_ACUITY_SCORE: 48
ADLS_ACUITY_SCORE: 50
ADLS_ACUITY_SCORE: 55
ADLS_ACUITY_SCORE: 50
ADLS_ACUITY_SCORE: 55

## 2025-01-23 NOTE — PROVIDER NOTIFICATION
"Antonio Hernandez PA-C notified at at 1600: Pt feeling very uncomfortable, states bladder feels \"rock hard.\" He tried to urinate but was unable. Bladder scan was 160 mL. Pt is requesting that we replace the Rodriguez because he is too uncomfortable to follow the straight cath policy.    Antonio Hernandez PA-C gave verbal order to replace Rodriguez now.   "

## 2025-01-23 NOTE — PLAN OF CARE
Hours of Care: 8700-0957    Events during shift:  - Pt refused scheduled potassium, RN managed potassium replacement, Bumex and Flomax this AM; educated on the risks of not taking these medications. Team notified and switched patient back to potassium sparing diuretic that he took PTA. Pt agreeable to magnesium and phos replacement.  - Pt was increasingly frustrated with IR procedure being delayed until 4pm. When IR was able to take the patient, he refused to have the procedure done until tomorrow. Provider notified and came to discuss with patient at bedside. RN reinforced education about the risks of delaying drain placement.   - Per IR team: pt can have clear liquids until 0700 1/23 before scheduled IR procedure at 0900. Pt was agreeable to this plan.     Neuro: A&O x 4. Call light appropriate.   Cardiac: SR. VSS.   Resp: Room air. Denies chest pain and SOB.   GI/: Regular diet, clear liquids at 0000 1/23 and NPO at 0700 1/23. ACHS BG. Intermittent nausea, prn zofran x2. BM x4 into brief today; prn imodium x1. Rodriguez in place - draining pink/cory urine.  Skin: See PCS for assessment details.  Lines/Drains: L PIV, saline locked. L PICC, hep locked.  Electrolytes: Phos and Mg replaced per protocol.  Activity: Up assist of 2 with lift or pivot with GB and walker. Tolerating well. Able to reposition self independently in bed. Pt encouraged to get up to chair or go on a walk with nursing staff, pt refused.  Pain: 8/10 chronic back pain. Received Oxy and Robaxin PRN x2.

## 2025-01-23 NOTE — PLAN OF CARE
"Changes during this shift: No acute events overnight, agreeable to POC. Clear liquids at 00:00, NPO at 07:00. C/O nausea unrelieved with zofran, Paged provider-Compazine ordered, also PRN atarax  & benadryl cream ordered for itching at sternal incision. Anxious this am about upcoming procedure- 1:1 conversation provided.     Running: None   PRN Med: 5 mg compazine, 25 mg Atarax     Vitals:  /62 (BP Location: Right arm)   Pulse 67   Temp 97.4  F (36.3  C) (Oral)   Resp 20   Ht 1.803 m (5' 11\")   Wt 79.6 kg (175 lb 7.8 oz)   SpO2 93%   BMI 24.48 kg/m     Neuro: Ax4 Denies Headache, dizziness,  Lightheadedness, calls appropriately. Absent sensation in RLE/LBKA  from previous spinal surgery.   Cardiac: SR  Afebrile, VSS.  Denies chest pain.   Respiratory: sating >90 ON RA. denies SOB. LS clear bilaterally diminished in the bases.    Diet/appetite: Clear liquid until 07:00, NPO at 07:00 1/23/25.  Endocrine: BS check ACHS, Pt on sliding scale insulin, no corrections needed overnight.   GI/: No BM this shift. C/O nausea this evening. Rodriguez catheter in place. Adequate urine output.   Activity:  Shifts weight in bed independently, A x 2/ lift.   Pain: Denies  Skin: Warm, dry, normal color. BKA (L)-prosthetic in room, See flowsheets for skin deficits.       "

## 2025-01-23 NOTE — PLAN OF CARE
"Hours of Care: [id number]     Events during shift:  - Pt went to IR for potential pericardial drain placement; they were unable to drain any fluid and did not leave a drain in place.  - Ca removed at 1400; pt became very symptomatic of urine retention at 1600, complaining of a \"rock hard\" bladder and severe abdominal discomfort. He requested ca be replaced. Talked to GENEVIEVE from primary team who ordered new ca to be put in. Bladder scan before ca showed 160 mL fluid in bladder, patient had out 400 mL upon placement of ca.  - GENEVIEVE from primary team requested that patient get full shower today to help with irritation of midsternal incision; pt refused a shower due to being tired and uncomfortable but was willing to have a bed bath.     Neuro: A&O x 4. Call light appropriate.   Cardiac: SR. VSS.   Resp: Room air. Denies chest pain and SOB.   GI/: Regular diet. ACHS BG. Intermittent nausea, prn zofran x2. BM x2 into brief today. Ca in place.  Skin: See PCS for assessment details.  Lines/Drains: L PIV, saline locked. L PICC, hep locked.  Electrolytes: Mg replaced per protocol.  Activity: Up assist of 2 with lift or pivot with GB and walker. Tolerating well. Able to reposition self independently in bed. Pt encouraged to get up to chair or go on a walk with nursing staff, pt refused.  Pain: Chronic back pain and groin pain from Ca placement. Received Oxy and Robaxin PRN x2.     "

## 2025-01-23 NOTE — PROGRESS NOTES
"Care Management Follow Up    Length of Stay (days): 23    Expected Discharge Date: 01/24/2025     Concerns to be Addressed: Discharge planning  Patient plan of care discussed at interdisciplinary rounds: Yes    Anticipated Discharge Disposition: Short term TCU placement     Anticipated Discharge Services:  Short term TCU placement  Anticipated Discharge DME: Not applicable at this time    Patient/family educated on Medicare website which has current facility and service quality ratings: Yes, this SW provided a \"Medicare Care Compare\" document on this date  Education Provided on the Discharge Plan: Yes  Patient/Family in Agreement with the Plan:  Yes    Referrals Placed by CM/SW:  Post acute care facility's  Private pay costs discussed: Not applicable at this time    Discussed  Partnership in Safe Discharge Planning  document with patient/family: No     Handoff Completed:  To be completed at time of discharge    Additional Information:  KALEIGH is following pt for discharge planning.  OT and Physical Therapy are recommending short term TCU placement and per Antonio Hernandez NP, pt is medically ready for discharge.  Per review of previous Care Management notes, it appears that pt was previously referred to [facility].  KALEIGH phoned [facility] and spoke with Nishi.   KALEIGH informed Nishi of readiness for discharge.   Nishi states that they are currently full but she will add pt's name to the wait list.    KALEIGH met with pt and discussed discharge planning.  Pt voiced awareness that a TCU stay is recommended.  Pt is agreeable.  KALEIGH informed pt that [facility] is full.  KALEIGH provided a \"Medicare Care Compare\" document and obtained add'l preferences from pt including:  - [facility], KALEIGH faxed a referral via [employer]  - [facility], KALEIGH faxed a referral via [facility]  - [facility], KALEIGH faxed a referral via [facility]  - [facility], KALEIGH faxed a referral via [employer]  - [facility] " KALEIGH Damian faxed a referral via [facility].    Per pt's request, KALEIGH phoned pt's partner, Isabella Shola and updated in regards to discharge.  Isabella also supports pursuit of short term TCU placement.      Next Steps: Await decisions from the above TCU's regarding acceptance.    WILMER Zambrano  Social Work, 6A  Phone:  [phone]  Pager:  [phone]  1/23/2025       ISABELLE Graf      Ambulatory

## 2025-01-23 NOTE — IR NOTE
Patient Name: Erwin Aguilar  Medical Record Number: [id number]  Today's Date: 1/23/2025    Procedure: Mediastinal drain placement  Proceduralist: CRISTINA Morgan PA-C  Pathology present: RANULFO    Procedure Start: 1001  Procedure end: 1024  Sedation medications administered: 50 mcg fentanyl, 0.5 mg midazolam     Report given to: ALOK Martinez RN  : RANULFO    Other Notes: Pt arrived to IR room 1 from . Consent reviewed. Pt denies any questions or concerns regarding procedure. Pt positioned supine and monitored per protocol. Pt tolerated procedure without any noted complications. Per Aaron Morgan PA-C, drain unable to be placed as there is not enough fluid. Pt transferred back to .

## 2025-01-23 NOTE — PROCEDURES
[facility]    Procedure: IR Procedure Note    Date/Time: 1/23/2025 10:35 AM    Performed by: Antonio Morgan PA-C  Authorized by: Antonio Morgan PA-C  IR Fellow Physician:    Pre Procedure Diagnosis: Retrosternal collection  Post Procedure Diagnosis: Same    UNIVERSAL PROTOCOL   Site Marked: NA  Prior Images Obtained and Reviewed:  Yes  Required items: Required blood products, implants, devices and special equipment available    Patient identity confirmed:  Hospital-assigned identification number (IR nurse)  Patient was reevaluated immediately before administering moderate or deep sedation or anesthesia  Confirmation Checklist:  Procedure was appropriate and matched the consent or emergent situation  Time out: Immediately prior to the procedure a time out was called    Universal Protocol: the Joint Commission Universal Protocol was followed    Preparation: Patient was prepped and draped in usual sterile fashion    ESBL (mL):  2     ANESTHESIA    Anesthesia:  Local infiltration  Local Anesthetic:  Lidocaine 1% without epinephrine  Anesthetic Total (mL):  8      SEDATION  Patient Sedated: Yes    Sedation Type:  Moderate (conscious) sedation  Sedation:  Fentanyl and midazolam  Vital signs: Vital signs monitored during sedation    Findings: Image guided access into retrosternal collection via a subxiphoid approach. Aspiration revealed scant serosanguinous return. No drain placed. Impression is consistent with hematoma. Patient declines CT scan to assess for residual collection.    Specimens: none    Procedural Complications: None    Condition: Stable    Plan: Follow up per primary team. Dressing changes until healed.      PROCEDURE    Length of time physician/provider present for 1:1 monitoring during sedation:  23-37 min

## 2025-01-23 NOTE — PRE-PROCEDURE
GENERAL PRE-PROCEDURE:     Verbal consent obtained?: Yes    Written consent obtained?: Yes    Risks and benefits: Risks, benefits and alternatives were discussed    Consent given by:  Patient  Patient states understanding of procedure being performed: Yes    Patient's understanding of procedure matches consent: Yes    Procedure consent matches procedure scheduled: Yes    Expected level of sedation:  Moderate  Appropriately NPO:  Yes  Mallampati  :  Grade 1- soft palate, uvula, tonsillar pillars, and posterior pharyngeal wall visible  Lungs:  Lungs clear with good breath sounds bilaterally (Posterior auscultation not performed. Lungs otherwise clear.)  Heart:  Normal heart sounds and rate  History & Physical reviewed:  History and physical reviewed and no updates needed  Statement of review:  I have reviewed the lab findings, diagnostic data, medications, and the plan for sedation

## 2025-01-23 NOTE — PROGRESS NOTES
CVTS Progress Note  01/23/2025         Assessment and Plan:     Erwin Aguilar is a 64 year old male with a past medical history of DM2, HTN, PVD, and neuropathic pain who initially presented on 12/31/24 to the ED with arm pain, vomiting, and diarrhea; ACS and ST depression. He subsequently had VT arrest with 1 round of CPR, epinephrine, defibrillation. After ROSC was found to be in afib with RVR. Became hypotensive and was subsequently cannulated for VA ECMO. Found to have multivessel disease s/p CABG x4 on 1/2/25 with Dr. Rosenbaum. ECMO decannulated on 1/3/25 at bedside. ICU course complicated by large R PTX seen on chest CT on 1/4/25 s/p bedside CT placement, VAP, and encephalopathy. Extubated 1/11/25.       Cardiovascular:   Hx of CAD - s/p CABG x4 with Dr. Rosenbaum   Mixed shock - cardiogenic, vasoplegic, resolved   HTN  Pre-op VT Cardiac Arrest on VA ECMO (1/1 - 1/3)  HLD  Prolonged Qtc, improved, QTc now 492   Hx PAF on DOAC PTA - s/p MAZE & LAAC  No arrhythmias overnight, HD stable, in NSR.   Post operative ECHO 1/5 with LVEF 55-60%, normal RV function, no pericardial effusion   1/17 Repeat echo in setting of therapeutic AC showed LVEF 60-65%, small, loculated pericardial effusion without HD compromise  1/21 Echo showed EF 55-60%, large pericardial effusion without HD compromise, and RA pressure of 8 mmHg  -  mg daily  - Rosuvastatin 40 mg daily   - Metoprolol tartrate to 50 mg BID   - Losartan 25mg daily  - Diuresis as below  - AC as below    Chest tubes: Removed in ICU   TPW: Removed in ICU     Pulmonary:  Acute Hypoxic Respiratory failure, resolved   Respiratory alkalosis 2:2 agitation - resolved   R tension pneumothorax, resolved   VAP, resolved   SubQ emphysema, resolved   Right hemidiaphragm elevation  Bilateral pleural effusions, small & asymptomatic  Extubated POD 9. Now saturating well on RA.  - Supplemental O2 PRN to keep sats > 92%  - Pulm hygiene, IS, OOB/activity and deep breathing  - PRN 3%  saline nebs + Albuterol   - PRN Duobneb     Neurology:  Acute post-operative pain, improved  Encephalopathy, improved   Delirium, resolved   Anxiety   Chronic pain with opioid dependence  Neuropathy, PTA  Insomnia   Multimodal analgesia regimen:  - Acetaminophen PRN  - Lidocaine patches  - Modest dose Robaxin PRN in setting of improving delirium  - PRN hydroxyzine discontinued in s/o delirium   - PO oxycodone PRN (PTA 10mg TID PRN)  - PTA baclofen   - 1/1 vEEG without seizure activity (severe encephalopathy) and 12/31, 1/4, and 1/7 CTH negative for acute findings.   - PTA Sertraline  - Psych consulted for encephalopathy/delirium recs in s/o prolonged QTc; appreciate assistance  - Ramelteon for sleep   - PTA gabapentin resumed at reduced dose, look to titrate to PTA dosing in the next couple of days - HS dose increased to 900mg PTA dose 1/17  - Delirium precautions; sitter discontinued 1/16     / Renal / Fluid / Electrolytes:  Hypernatremia, resolved   AUSTIN/ATN, resolved  Hypervolemia, improving  Hypokalemia, 2/2 diuresis   BL creat ~ 0.6-0.8, creatinine at baseline; adequate UOP with multiple unmeasured voids. Only Bed Weights recorded. Prosthesis now fitting, can get standing weight for baseline. Patient reports home weight around 180-182 lbs.   - Diuresis: Appears euvolemic. Continue PTA Triamterene/hydrochlorothiazide 37.5/25 mg   - Replete lytes per protocol  - Strict I/O, daily weights  - Avoid/limit nephrotoxins as able  - Lymphedema following-edema significantly improved.     Urinary retention  Appreciate urology consult 1/19  Continue flomax-patient refusing.   Remove ca catheter, follow post void protocol. Once PVR amount is established, order straight cath as below.   Plan once post void residual amount establish:  Frequency of SIC can be determined based on the average post-void residual:  If PVR < 150cc - no cathing needed  If PVR is 150-250cc - cath bid (qam & qhs)  If PVR is 251-350cc - cath  tid  If PVR is 351-450cc - cath qid  If PVR is > 450cc - cath every 4 hours  Patient can either be managed with a indwelling ca catheter vs clean intermittent catheterization until seen in f/u in urology clinic  If CIC is chosen, then the patient will need nursing instruction on proper self-intermittent catheterization technique.  Remind patient that CIC should be performed ONLY after an attempt at spontaneous voiding is made  The patient will need to keep a journal of his cathing regimen after discharge (will need to include frequency of cathing and post-void residual amount obtained from CIC) and bring this to clinic for review   If patient unwilling or unable to perform CIC, may leave ca catheter in place   Patient will need to f/u in urology clinic approximately 1 week after discharge for a trial of void and discussion of bladder emptying issues    GI / Nutrition:   Dysphagia   Severe malnutrition in the context of acute on chronic illness   Liquid stools   Orders Placed This Encounter      Regular Diet Adult  - Dietitian consulted and following, appreciate assistance  - PRN bowel regimen  - PRN Imodium 2 mg QID PRN     Endocrine:  Stress-induced hyperglycemia, resolved  Type 2 diabetes mellitus   Hgb A1c 5.7  - Initially managed on insulin drip postop, transitioned to sliding scale & Lantus  - Goal BG <180 for optimal healing  - Lantus 20 bedtime (PTA dose 26 unit at bedtime)     Infectious Disease:  Stress-induced leukocytosis, resolved  Sternal wound infection  Aspiration PNA: Klebsiella pneumoniae   WBC 8.3, afebrile  - Completed perioperative antibiotics  - Continue to monitor fever curve, CBC  - 1/10 C. Diff neg  - 1/14 infectious work-up for worsening leukocytosis negative thus far; follow for final results   - 1/14 BLE US neg for DVT  - 1/15 2-view CXR with low lung volumes, small bilateral pleural effusions but too small for drainage  - Defer respiratory viral panel given stable on RA, afebrile  -  Klebsiella pneumoniae from sputum cultures on 12/31 and 1/9. Completed course of Zosyn from 1/9-1/20.   - Zosyn and vancomycin were restarted 1/9 for fever. Sputum culture again grew Klebsiella pneumoniae.   - 1/11 noted to have small puss drainage from sternotomy    - CT chest on 1/12/25, not concerning for deep infection  - CT CAP 1/17 to evaluate abd pain, continued unexplained leukocytosis. Demonstrated large substernal fluid collection.   - Holding  Apixaban   - Discontinue Vanc &Zosyn per Dr. Rosenbaum, and monitor off abx. Off all antibiotics since 1/20.  WBC remain normal.   - IR on Thursday 1/23/2025 per proceduralist:  Image guided access into retrosternal collection via a subxiphoid approach. Aspiration revealed scant serosanguinous return. No drain placed. Impression is consistent with hematoma. Patient declines CT scan to assess for residual collection.   - Plan to discharge home and follow up ECHO and CT chest with contrast prior to follow up appointment.     Hematology:   Acute blood loss anemia  Thrombocytopenia, resolved  Thrombocytosis, likely reactive, resolved   Therapeutic AC for PAF  Hgb stable; Plt WNL, no signs or symptoms of active bleeding  - CBC daily    Anticoagulation:   - ASA 81 mg   - PTA apixaban resumed for hx of PAF now s/p MAZE and LAAC; defer long-term AC plan to OP Cardiology, Holding 1/20/2025 per Dr. Rosenbaum in the setting of growing substernal fluid collection. Will plan to hold at discharge.     MSK/Skin:  Sternotomy  Surgical incisions  Hx RLE BKA  - Sternal precautions  - Incisional cares per protocol  - Right groin decannulation wound dehisced at apex with staple removal, packing wet to dry BID    Sternal incision dermatitis.   Skin glue remained in place and appeared to be irritating skin. Skin glue removed with adhesive remover.   Patient reported that PRN atarax was helpful with itching, can continue with PRN atarax.     Prophylaxis:   - Stress ulcer prophylaxis: Pantoprazole  "40 mg daily for 30 days  - DVT prophylaxis: Holding AC with substernal hematoma, OOB/ambulation, SCD    Disposition:   - Transferred to    - Therapies recommending discharge to TCU   - Ready for discharge today.     Seen with Dr. Robi Hernandez DNP APRN CNP CCRN   Cardiovascular Surgery   Available on Vocea or Secure Chat   Pager [phone]           Interval History:     Doing well this AM. Reported walking in the hallway with prosthetic. Agreeable to therapy.   Reports pain controlled.   Mentation at baseline.   Denies SOB. Appetite good. +BM. No chest pain. Surgical pain under control.   Denies sternal popping/clicking.         Physical Exam:     Gen: NAD, resting in bed, calm, cooperative on exam, conversant  Neuro: alert & grossly oriented, no focal deficits.  CV: RRR on monitor - SR  Pulm: unlabored breathing on RA  Abd:  Non-tender. +BM  Ext: trace LE edema left leg, improved swelling in right BKA.   Sternal incision intact, no erythema or induration, sternum stable, Sternum dry today with no drainage. Dermatitis surrounding incision, skin glue from surgery removed.   R groin wound packed, dressing c/d/I. No erythema.   Tubes/drain sites: CYRIL, scabbed over, no erythema or induration         Data:    /73 (BP Location: Right arm)   Pulse 74   Temp 97.5  F (36.4  C) (Oral)   Resp 18   Ht 1.803 m (5' 11\")   Wt 79.6 kg (175 lb 7.8 oz)   SpO2 94%   BMI 24.48 kg/m      Vitals:    25 0657 25 0306 25 0400   Weight: 83 kg (182 lb 15.7 oz) 81.5 kg (179 lb 10.8 oz) 79.6 kg (175 lb 7.8 oz)       Imaging:       [facility],[place]  Echocardiography Laboratory  [address] [place] [id number]     Name: SUSAN CHENG  MRN: [id number]  : 1960  Study Date: 2025 08:55 AM  Age: 64 yrs  Gender: Male  Patient Location: [facility]  Reason For Study: Pericardial Effusion  Ordering Physician: DELPHINE HERNANDEZ  Performed By: Milton Levin, " RDCS     BSA: 2.0 m2  Height: 71 in  Weight: 180 lb  HR: 75  BP: 139/77 mmHg  ______________________________________________________________________________  Procedure  Limited Echocardiogram with two-dimensional, color and spectral Doppler. The  final echo results were communicated to Aaron Hernandez.  ______________________________________________________________________________  Interpretation Summary  Large, loculated pericardial effusion anterior to the right ventricle without  hemodynamic compromise. Slightly larger than observed on the 1/17/25 study.  ______________________________________________________________________________  Left Ventricle  Left ventricular function is normal.The ejection fraction is 55-60%.     Right Ventricle  The right ventricle is normal size. Global right ventricular function is  normal.     Mitral Valve  The mitral valve is normal.     Tricuspid Valve  The tricuspid valve is normal.     Vessels  IVC diameter and respiratory changes fall into an intermediate range  suggesting an RA pressure of 8 mmHg.     Pericardium  Large, loculated pericardial effusion anterior to the right ventricle without  hemodynamic compromise. Slightly larger than observed on the 1/17/25 study.     Miscellaneous  A left pleural effusion is present.  ______________________________________________________________________________  Report approved by: Alessio Marte MD on 01/20/2025 09:41 AM     ____________________________________________________________________              Labs:  Recent Labs   Lab 01/23/25 0814 01/23/25 0410 01/22/25 2230 01/22/25 0823 01/22/25 0413 01/21/25 2253 01/21/25  0833 01/21/25 0417   WBC  --  8.3  --   --  7.3  --   --  6.2   HGB  --  9.4*  --   --  8.9*  --   --  9.0*   MCV  --  97  --   --  96  --   --  94   PLT  --  292  --   --  261  --   --  282   INR  --   --   --   --  1.37*  --   --   --    NA  --  139  --   --  141  --   --  140   POTASSIUM  --  3.9  --   --  3.6   3.6 3.6   < > 3.1*   CHLORIDE  --  104  --   --  106  --   --  105   CO2  --  26  --   --  28  --   --  26   BUN  --  7.9*  --   --  7.8*  --   --  7.5*   CR  --  0.71  --   --  0.53*  --   --  0.52*   ANIONGAP  --  9  --   --  7  --   --  9   [place]  --  8.9  --   --  8.3*  --   --  8.2*   * 116* 143*   < > 112*  --    < > 122*    < > = values in this interval not displayed.      GLUCOSE:   Recent Labs   Lab 01/23/25  0814 01/23/25  0410 01/22/25  2230 01/22/25  1538 01/22/25  1229 01/22/25  0823   * 116* 143* 96 111* 110*

## 2025-01-24 ENCOUNTER — APPOINTMENT (OUTPATIENT)
Dept: PHYSICAL THERAPY | Facility: CLINIC | Age: 65
DRG: 003 | End: 2025-01-24
Payer: MEDICARE

## 2025-01-24 LAB
ANION GAP SERPL CALCULATED.3IONS-SCNC: 8 MMOL/L (ref 7–15)
BUN SERPL-MCNC: 8.2 MG/DL (ref 8–23)
CALCIUM SERPL-MCNC: 9 MG/DL (ref 8.8–10.4)
CHLORIDE SERPL-SCNC: 104 MMOL/L (ref 98–107)
CREAT SERPL-MCNC: 0.6 MG/DL (ref 0.67–1.17)
EGFRCR SERPLBLD CKD-EPI 2021: >90 ML/MIN/1.73M2
ERYTHROCYTE [DISTWIDTH] IN BLOOD BY AUTOMATED COUNT: 15.9 % (ref 10–15)
ERYTHROCYTE [DISTWIDTH] IN BLOOD BY AUTOMATED COUNT: 16 % (ref 10–15)
GLUCOSE BLDC GLUCOMTR-MCNC: 134 MG/DL (ref 70–99)
GLUCOSE BLDC GLUCOMTR-MCNC: 171 MG/DL (ref 70–99)
GLUCOSE BLDC GLUCOMTR-MCNC: 80 MG/DL (ref 70–99)
GLUCOSE BLDC GLUCOMTR-MCNC: 93 MG/DL (ref 70–99)
GLUCOSE SERPL-MCNC: 64 MG/DL (ref 70–99)
HCO3 SERPL-SCNC: 27 MMOL/L (ref 22–29)
HCT VFR BLD AUTO: 31.4 % (ref 40–53)
HCT VFR BLD AUTO: 32 % (ref 40–53)
HGB BLD-MCNC: 10.2 G/DL (ref 13.3–17.7)
HGB BLD-MCNC: 10.3 G/DL (ref 13.3–17.7)
MAGNESIUM SERPL-MCNC: 1.9 MG/DL (ref 1.7–2.3)
MCH RBC QN AUTO: 29.9 PG (ref 26.5–33)
MCH RBC QN AUTO: 30.6 PG (ref 26.5–33)
MCHC RBC AUTO-ENTMCNC: 31.9 G/DL (ref 31.5–36.5)
MCHC RBC AUTO-ENTMCNC: 32.8 G/DL (ref 31.5–36.5)
MCV RBC AUTO: 93 FL (ref 78–100)
MCV RBC AUTO: 94 FL (ref 78–100)
PHOSPHATE SERPL-MCNC: 3.8 MG/DL (ref 2.5–4.5)
PLATELET # BLD AUTO: 255 10E3/UL (ref 150–450)
PLATELET # BLD AUTO: 298 10E3/UL (ref 150–450)
POTASSIUM SERPL-SCNC: 3.3 MMOL/L (ref 3.4–5.3)
RBC # BLD AUTO: 3.37 10E6/UL (ref 4.4–5.9)
RBC # BLD AUTO: 3.41 10E6/UL (ref 4.4–5.9)
SODIUM SERPL-SCNC: 139 MMOL/L (ref 135–145)
WBC # BLD AUTO: 10.3 10E3/UL (ref 4–11)
WBC # BLD AUTO: 7.9 10E3/UL (ref 4–11)

## 2025-01-24 PROCEDURE — 250N000011 HC RX IP 250 OP 636: Performed by: SURGERY

## 2025-01-24 PROCEDURE — 84100 ASSAY OF PHOSPHORUS: CPT | Performed by: STUDENT IN AN ORGANIZED HEALTH CARE EDUCATION/TRAINING PROGRAM

## 2025-01-24 PROCEDURE — 250N000013 HC RX MED GY IP 250 OP 250 PS 637: Performed by: NURSE PRACTITIONER

## 2025-01-24 PROCEDURE — 82310 ASSAY OF CALCIUM: CPT

## 2025-01-24 PROCEDURE — 85018 HEMOGLOBIN: CPT

## 2025-01-24 PROCEDURE — 97116 GAIT TRAINING THERAPY: CPT | Mod: GP

## 2025-01-24 PROCEDURE — 97530 THERAPEUTIC ACTIVITIES: CPT | Mod: GP

## 2025-01-24 PROCEDURE — 83735 ASSAY OF MAGNESIUM: CPT | Performed by: STUDENT IN AN ORGANIZED HEALTH CARE EDUCATION/TRAINING PROGRAM

## 2025-01-24 PROCEDURE — 250N000013 HC RX MED GY IP 250 OP 250 PS 637

## 2025-01-24 PROCEDURE — 250N000013 HC RX MED GY IP 250 OP 250 PS 637: Performed by: STUDENT IN AN ORGANIZED HEALTH CARE EDUCATION/TRAINING PROGRAM

## 2025-01-24 PROCEDURE — 250N000013 HC RX MED GY IP 250 OP 250 PS 637: Performed by: SURGERY

## 2025-01-24 PROCEDURE — 85048 AUTOMATED LEUKOCYTE COUNT: CPT | Performed by: PHYSICIAN ASSISTANT

## 2025-01-24 PROCEDURE — 85014 HEMATOCRIT: CPT | Performed by: PHYSICIAN ASSISTANT

## 2025-01-24 PROCEDURE — 250N000011 HC RX IP 250 OP 636: Performed by: PHYSICIAN ASSISTANT

## 2025-01-24 PROCEDURE — 120N000003 HC R&B IMCU UMMC

## 2025-01-24 PROCEDURE — 250N000011 HC RX IP 250 OP 636: Performed by: NURSE PRACTITIONER

## 2025-01-24 PROCEDURE — 82565 ASSAY OF CREATININE: CPT

## 2025-01-24 PROCEDURE — 80048 BASIC METABOLIC PNL TOTAL CA: CPT

## 2025-01-24 PROCEDURE — 250N000013 HC RX MED GY IP 250 OP 250 PS 637: Performed by: PHYSICIAN ASSISTANT

## 2025-01-24 RX ADMIN — Medication 10 ML: at 11:34

## 2025-01-24 RX ADMIN — RAMELTEON 8 MG: 8 TABLET ORAL at 22:12

## 2025-01-24 RX ADMIN — HEPARIN SODIUM 5000 UNITS: 5000 INJECTION, SOLUTION INTRAVENOUS; SUBCUTANEOUS at 05:05

## 2025-01-24 RX ADMIN — METOPROLOL TARTRATE 50 MG: 25 TABLET, FILM COATED ORAL at 20:47

## 2025-01-24 RX ADMIN — Medication 5 ML: at 17:12

## 2025-01-24 RX ADMIN — LOSARTAN POTASSIUM 25 MG: 25 TABLET, FILM COATED ORAL at 07:52

## 2025-01-24 RX ADMIN — METHOCARBAMOL 500 MG: 500 TABLET ORAL at 07:52

## 2025-01-24 RX ADMIN — GABAPENTIN 300 MG: 300 CAPSULE ORAL at 15:11

## 2025-01-24 RX ADMIN — ROSUVASTATIN CALCIUM 40 MG: 20 TABLET, FILM COATED ORAL at 07:52

## 2025-01-24 RX ADMIN — LOPERAMIDE HYDROCHLORIDE 2 MG: 2 CAPSULE ORAL at 16:54

## 2025-01-24 RX ADMIN — THIAMINE HCL TAB 100 MG 100 MG: 100 TAB at 07:51

## 2025-01-24 RX ADMIN — SERTRALINE HYDROCHLORIDE 200 MG: 100 TABLET ORAL at 07:51

## 2025-01-24 RX ADMIN — OXYCODONE HYDROCHLORIDE 10 MG: 10 TABLET ORAL at 12:54

## 2025-01-24 RX ADMIN — HYDROXYZINE HYDROCHLORIDE 25 MG: 25 TABLET, FILM COATED ORAL at 07:52

## 2025-01-24 RX ADMIN — CALCIUM CARBONATE (ANTACID) CHEW TAB 500 MG 1000 MG: 500 CHEW TAB at 16:54

## 2025-01-24 RX ADMIN — METOPROLOL TARTRATE 50 MG: 25 TABLET, FILM COATED ORAL at 07:52

## 2025-01-24 RX ADMIN — METHOCARBAMOL 500 MG: 500 TABLET ORAL at 15:11

## 2025-01-24 RX ADMIN — HEPARIN SODIUM 5000 UNITS: 5000 INJECTION, SOLUTION INTRAVENOUS; SUBCUTANEOUS at 15:11

## 2025-01-24 RX ADMIN — GABAPENTIN 900 MG: 300 CAPSULE ORAL at 22:11

## 2025-01-24 RX ADMIN — OXYCODONE HYDROCHLORIDE 10 MG: 10 TABLET ORAL at 06:24

## 2025-01-24 RX ADMIN — TAMSULOSIN HYDROCHLORIDE 0.4 MG: 0.4 CAPSULE ORAL at 07:52

## 2025-01-24 RX ADMIN — ASPIRIN 81 MG CHEWABLE TABLET 81 MG: 81 TABLET CHEWABLE at 07:52

## 2025-01-24 RX ADMIN — BACLOFEN 10 MG: 10 TABLET ORAL at 07:51

## 2025-01-24 RX ADMIN — HYDROXYZINE HYDROCHLORIDE 25 MG: 25 TABLET, FILM COATED ORAL at 22:12

## 2025-01-24 RX ADMIN — ONDANSETRON 4 MG: 4 TABLET, ORALLY DISINTEGRATING ORAL at 06:24

## 2025-01-24 RX ADMIN — Medication 10 ML: at 05:04

## 2025-01-24 RX ADMIN — THERA TABS 1 TABLET: TAB at 07:52

## 2025-01-24 RX ADMIN — ONDANSETRON 4 MG: 4 TABLET, ORALLY DISINTEGRATING ORAL at 18:48

## 2025-01-24 RX ADMIN — ONDANSETRON 4 MG: 4 TABLET, ORALLY DISINTEGRATING ORAL at 12:54

## 2025-01-24 RX ADMIN — TRIAMTERENE AND HYDROCHLOROTHIAZIDE 1 TABLET: 37.5; 25 TABLET ORAL at 07:51

## 2025-01-24 RX ADMIN — BACLOFEN 10 MG: 10 TABLET ORAL at 15:11

## 2025-01-24 RX ADMIN — OXYCODONE HYDROCHLORIDE 10 MG: 10 TABLET ORAL at 18:47

## 2025-01-24 RX ADMIN — CALCIUM CARBONATE (ANTACID) CHEW TAB 500 MG 1000 MG: 500 CHEW TAB at 07:51

## 2025-01-24 ASSESSMENT — ACTIVITIES OF DAILY LIVING (ADL)
ADLS_ACUITY_SCORE: 49
ADLS_ACUITY_SCORE: 51
ADLS_ACUITY_SCORE: 49
ADLS_ACUITY_SCORE: 51
ADLS_ACUITY_SCORE: 51
ADLS_ACUITY_SCORE: 49
ADLS_ACUITY_SCORE: 51
ADLS_ACUITY_SCORE: 51
ADLS_ACUITY_SCORE: 49
ADLS_ACUITY_SCORE: 51

## 2025-01-24 NOTE — PLAN OF CARE
"     Changes during this shift: No acute events overnight  Running: None   PRN Med: Oxy 10 mg x 2, 4 mg zofran x 2     Vitals:/73 (BP Location: Right arm)   Pulse 63   Temp 97.5  F (36.4  C) (Oral)   Resp 16   Ht 1.803 m (5' 11\")   Wt 76.5 kg (168 lb 10.4 oz)   SpO2 94%   BMI 23.52 kg/m     Neuro: Ax4 Denies Headache, dizziness,  Lightheadedness, calls appropriately. Absent sensation in RLE/LBKA  from previous spinal surgery.   Cardiac: SR  Afebrile, VSS.  Denies chest pain.   Respiratory: sating >90 ON RA. denies SOB. LS clear bilaterally diminished in the bases.    Diet/appetite: Regular Diet.  Endocrine: BS check ACHS, Pt on sliding scale insulin, no corrections needed overnight.   GI/: Sm BM this shift.  Rodriguez catheter in place. Adequate urine output.   Activity:  Shifts weight in bed independently, A x 2/ lift.   Pain: Denies  Skin: Warm, dry, normal color. BKA (L)-prosthetic in room, See flowsheets for skin deficits.           "

## 2025-01-24 NOTE — DISCHARGE SUMMARY
[facility], [place]   [facility] Discharge Summary     Erwin Aguilar MRN# [id number]   Age: 64 year old YOB: 1960     Admitting Physician:  Manisha Geronimo MD  Discharge Provider:  JAMI Lind  Primary Care Physician:        Wegener, Joel Daniel Irwin     DATE OF ADMISSION: 12/31/2024      DATE OF DISCHARGE: January 25, 2025     Admit Wt: 167 lbs   Discharge Wt: 170 lbs          Primary Diagnoses:   1. CAD with acute coronary syndrome (STEMI) with refractory VT arrest in the ED with CPR and ROSC  2. Unstable hemodynamics requiring placement on VA ECMO  3. Chronic Hx of Atrial fibrillation on DOAC, pre-op Atrial fibrillation  4. S/P 4 vessel CABG with MAZE procedure and left atrial appendage clip  5. Substernal fluid collection, Apixaban held since 1/20/25  6. Aspiration PNA: Klebsiella pneumoniae   7. Right groin incision superficial dehiscence, wet to dry packing until closed   8. Urinary Retention, Rodriguez in place (intermittent hematuria, stable). Need Voiding Trial 1/30/25, Urology consult if unable to void.   9. Prolonged QTC, stable  10. Right hemidiaphragm elevation  11. Post-op encephalopathy, resolved  12. AUSTIN, resolved   13. Dysphagia, resolved  14. Severe malnutrition in context of acute on chronic illness  15. Substernal fluid collection, hematoma vs seroma. HOLDING systemic anticoagulation.          Secondary Diagnoses:   1. HTN  2. HLD  3. Hx   4. DMT2  5. PAD with Hx Right BKA with orthotic   6. Peripheral neuropathy  7. Chronic pain with opioid dependence     PROCEDURES PERFORMED:   Date: 1/2/2025.  Surgeon: Dr. Chad Rosenbaum   1.  Coronary artery bypass grafting x 4   -reversed saphenous vein graft to the right posterior descending coronary artery  -reversed saphenous vein graft to the obtuse marginal branch of the left circumflex coronary artery  -reversed saphenous vein graft to the ramus intermedius coronary  artery  -in-situ left internal mammary artery to the left anterior descending coronary artery.  2.  Endoscopic harvest of the greater saphenous vein from the left lower extremity.  3.  Left atrial appendage ligation with a 50mm Atriclip  4.  Left atrial ablation with the Atricure Encompass clamp    Date: 1/2/2025.  Surgeon: Dr. Chad Rosenbaum   1.  VA-ECMO decannulation (right femoral artery / right femoral vein)  2.  Primary repair of right femoral artery cannulation site.  3.  Primary repair of right femoral venous cannulation site    INTRAOPERATIVE COMPLICATIONS:  none    PATHOLOGY RESULTS:  none     CULTURE RESULTS:    All cultures:  30-Day Micro Results       Collected Updated Procedure Result Status      01/14/2025 1035 01/19/2025 1146 Blood Culture Hand, Left [[id number]]   Blood from Hand, Left    Final result Component Value   Culture No Growth               01/14/2025 1030 01/19/2025 1146 Blood Culture Hand, Right [[id number]]   Blood from Hand, Right    Final result Component Value   Culture No Growth               01/11/2025 1340 01/18/2025 0801 Anaerobic Bacterial Culture Routine [[id number]]   Wound from Sternum    Final result Component Value   Culture No anaerobic organisms isolated               01/11/2025 1339 01/13/2025 0721 Wound Aerobic Bacterial Culture Routine With Gram Stain [[id number]]   Wound from Sternum    Final result Component Value   Culture No Growth   Gram Stain Result No organisms seen    3+ WBC seen    Predominantly PMNs               01/10/2025 1145 01/10/2025 1417 C. difficile Toxin B PCR with reflex to C. difficile Antigen and Toxins A/B EIA [[id number]]    Stool from Per Rectum    Final result Component Value   C Difficile Toxin B by PCR Negative   A negative result does not exclude actual disease due to C. difficile and may be due to improper collection, handling and storage of the specimen or the number of organisms in the specimen is below the detection limit of the  assay.            01/10/2025 1125 01/15/2025 1301 Blood Culture Hand, Right [[id number]]   Blood from Hand, Right    Final result Component Value   Culture No Growth               01/10/2025 1103 01/15/2025 1301 Blood Culture Arm, Left [[id number]]   Blood from Arm, Left    Final result Component Value   Culture No Growth               01/09/2025 2028 01/14/2025 2131 Blood Culture Hand, Right [[id number]]   Blood from Hand, Right    Final result Component Value   Culture No Growth               01/09/2025 2016 01/14/2025 2131 Blood Culture Hand, Left [[id number]]   Blood from Hand, Left    Final result Component Value   Culture No Growth               01/09/2025 0410 01/11/2025 2200 Respiratory Aerobic Bacterial Culture with Gram Stain [[id number]]    (Abnormal)   Sputum from Endotracheal    Final result Component Value   Culture 1+ Klebsiella pneumoniae   Gram Stain Result <25 PMNs/low power field    No organisms seen        Susceptibility        Klebsiella pneumoniae      JESSICA      Ampicillin  Resistant  [1]       Ampicillin/ Sulbactam 4 ug/mL Susceptible      Cefepime <=0.12 ug/mL Susceptible      Ceftazidime <=0.5 ug/mL Susceptible      Ceftriaxone <=0.25 ug/mL Susceptible      Ciprofloxacin <=0.06 ug/mL Susceptible      Gentamicin <=1 ug/mL Susceptible      Levofloxacin <=0.12 ug/mL Susceptible      Meropenem <=0.25 ug/mL Susceptible      Piperacillin/Tazobactam <=4 ug/mL Susceptible      Trimethoprim/Sulfamethoxazole <=1/19 ug/mL Susceptible                   [1]  Intrinsically Resistant                    01/08/2025 2049 01/12/2025 1011 Blood Culture Hand, Right [[id number]]    (Abnormal)   Blood from Hand, Right    Final result Component Value   Culture Positive on the 1st day of incubation    Staphylococcus hominis    1 of 2 bottles  The recovery of this organism from a single blood culture bottle most likely represents contamination. If susceptibility testing is needed, please refer to the  "antibiogram or contact IDDL. If contamination is suspected, it is important to repeat two sets of blood cultures from two different sites.    Staphylococcus epidermidis    2 of 2 bottles        Susceptibility        Staphylococcus epidermidis      JESSICA      Ciprofloxacin <=0.5 ug/mL Susceptible      Clindamycin >=4 ug/mL Resistant      Daptomycin 0.5 ug/mL Susceptible      Doxycycline 1 ug/mL Susceptible      Erythromycin >=8 ug/mL Resistant      Gentamicin <=0.5 ug/mL Susceptible      Levofloxacin <=0.12 ug/mL Susceptible      Oxacillin >=4 ug/mL Resistant  [1]       Tetracycline 2 ug/mL Susceptible      Vancomycin 1 ug/mL Susceptible                   [1]  Oxacillin susceptible isolates are susceptible to cephalosporins (example: cefazolin and cephalexin) and beta lactam combination agents. Oxacillin resistant isolates are resistant to these agents.               Susceptibility Comments       Staphylococcus epidermidis    Antibiotics listed as \"No Interpretation\" have no regulatory guidelines for susceptibility/resistance available.               01/08/2025 2049 01/09/2025 1633 Mobile [employer] GP Panel [[id number]]    (Abnormal)   Blood from Hand, Right    Final result Component Value   Staphylococcus aureus Not Detected   Staphylococcus epidermidis Detected   Positive for methicillin-resistant Staphylococcus epidermidis (MRSE) by [employer] multiplex nucleic acid test. Final identification and antimicrobial susceptibility testing will be verified by standard methods.   Staphylococcus lugdunensis Not Detected   Enterococcus faecalis Not Detected   Enterococcus faecium Not Detected   Streptococcus species Not Detected   Streptococcus agalactiae Not Detected   Streptococcus anginosus group Not Detected   Streptococcus pneumoniae Not Detected   Streptococcus pyogenes Not Detected   Listeria species Not Detected            01/08/2025 2040 01/13/2025 2131 Blood Culture Hand, Left [[id number]]   Blood from Hand, Left    Final " result Component Value   Culture No Growth               01/04/2025 0841 01/06/2025 1259 Respiratory Aerobic Bacterial Culture with Gram Stain [[id number]]   (Abnormal)   Sputum from Endotracheal    Final result Component Value   Culture 1+ Klebsiella pneumoniae    Susceptibilities done on previous cultures    1+ Lactobacillus species    Identification obtained by MALDI-TOF mass spectrometry research use only database. Test characteristics determined and verified by the [facility].  Susceptibilities not routinely done, refer to antibiogram to view typical susceptibility profiles   Gram Stain Result <25 PMNs/low power field    1+ Gram negative bacilli               01/03/2025 2022 01/08/2025 2216 Blood Culture Hand, Left [[id number]]    Blood from Hand, Left    Final result Component Value   Culture No Growth               01/03/2025 2020 01/08/2025 2216 Blood Culture Arm, Left [[id number]]   Blood from Arm, Left    Final result Component Value   Culture No Growth               01/02/2025 0554 01/05/2025 1601 Respiratory Aerobic Bacterial Culture [[id number]]   Sputum from Endotracheal    Final result Component Value   Culture 1+ Normal Ivania   Gram Stain Result >25 PMNs/low power field    No organisms seen               01/01/2025 0854 01/03/2025 0730 Respiratory Aerobic Bacterial Culture [[id number]]   Sputum from Endotracheal    Final result Component Value   Culture 1+ Normal Ivania   Gram Stain Result >25 PMNs/low power field    No organisms seen               12/31/2024 1127 01/05/2025 1301 Blood Culture Arm, Right [[id number]]   Blood from Arm, Right    Final result Component Value   Culture No Growth               12/31/2024 1124 01/05/2025 1301 Blood Culture Hand, Right [[id number]]   Blood from Hand, Right    Final result Component Value   Culture No Growth               12/31/2024 1122 12/31/2024 1404 MRSA MSSA PCR [[id number]]    Swab from Nares, Bilateral     Final result Component Value   MRSA Target DNA Negative   SA Target DNA Negative            12/31/2024 1122 01/03/2025 0109 Respiratory Aerobic Bacterial Culture with Gram Stain [[id number]]    (Abnormal)   Sputum from Endotracheal    Final result Component Value   Culture 1+ Klebsiella pneumoniae    3+ Normal lyndsey   Gram Stain Result <25 PMNs/low power field    2+ Mixed lyndsey        Susceptibility        Klebsiella pneumoniae      JESSICA      Ampicillin  Resistant  [1]       Ampicillin/ Sulbactam 8 ug/mL Susceptible      Cefepime <=0.12 ug/mL Susceptible      Ceftazidime <=0.5 ug/mL Susceptible      Ceftriaxone <=0.25 ug/mL Susceptible      Ciprofloxacin <=0.06 ug/mL Susceptible      Gentamicin <=1 ug/mL Susceptible      Levofloxacin <=0.12 ug/mL Susceptible      Meropenem <=0.25 ug/mL Susceptible      Piperacillin/Tazobactam <=4 ug/mL Susceptible      Trimethoprim/Sulfamethoxazole <=1/19 ug/mL Susceptible                   [1]  Intrinsically Resistant                             CONSULTS:    1. PT/OT  2. Nephrology   3. Psychiatry   4. Urology   5. Interventional Radiology     BRIEF HISTORY OF ILLNESS:  Erwin Aguilar is a 64 year old male with a past medical history of DM2, HTN, PVD, and neuropathic pain who initially presented on 12/31/24 to the [facility] ED with arm pain, vomiting, and diarrhea; found to have ACS and ST depression. He subsequently had VT arrest with 1 round of CPR, epinephrine, defibrillation. After ROSC was found to be in afib with RVR. Became hypotensive and was subsequently cannulated for VA ECMO. Found to have multivessel disease s/p CABG x4 on 1/2/25 with Dr. Rosenbaum. ECMO decannulated on 1/3/25 at bedside. ICU course complicated by large R PTX seen on chest CT on 1/4/25 s/p bedside CT placement, VAP, and encephalopathy. Extubated 1/11/25.     HOSPITAL COURSE:   Erwin Aguilar is a 64 year old male who underwent the above-named procedures.  He tolerated the operation well and postoperatively was  admitted to the CVICU.  He was extubated on 1/11/25 to 3 lpm via NC with neuro status intact.      Cardiovascular:   Hx of CAD - s/p CABG x4 with Dr. Rosenbaum   Mixed shock - cardiogenic, vasoplegic, resolved   HTN  Pre-op VT Cardiac Arrest on VA ECMO (1/1 - 1/3)  HLD  Prolonged Qtc, improved, QTc now 492   Hx PAF on DOAC PTA - s/p MAZE & LAAC  No arrhythmias overnight, HD stable, in NSR.   Post operative ECHO 1/5 with LVEF 55-60%, normal RV function, no pericardial effusion   1/17 Repeat echo in setting of therapeutic AC showed LVEF 60-65%, small, loculated pericardial effusion without HD compromise  Echo 1/21 showed EF 55-60%, large pericardial effusion without HD compromise, and RA pressure of 8 mmHg  -  mg daily  - Rosuvastatin 40 mg daily   - Metoprolol tartrate to 50 mg BID   - Losartan 25mg daily    Pulmonary:  Acute Hypoxic Respiratory failure, resolved   Respiratory alkalosis 2:2 agitation - resolved   R tension pneumothorax, resolved   VAP, resolved   SubQ emphysema, resolved   Right hemidiaphragm elevation  Bilateral pleural effusions, small & asymptomatic  Extubated POD 9. Now saturating well on RA.  - Supplemental O2 PRN to keep sats > 92%  - Pulm hygiene, IS, OOB/activity and deep breathing  - PRN 3% saline nebs + Albuterol   - PRN Duobneb      Neurology:  Acute post-operative pain, improved  Encephalopathy, improved   Delirium, resolved   Anxiety   Chronic pain with opioid dependence  Neuropathy, PTA  Insomnia   Multimodal analgesia regimen:  - Acetaminophen PRN  - Lidocaine patches  - Modest dose Robaxin PRN in setting of improving delirium  - PRN hydroxyzine discontinued in s/o delirium   - PO oxycodone PRN (PTA 10mg TID PRN)  - PTA baclofen   - 1/1 vEEG without seizure activity (severe encephalopathy) and 12/31, 1/4, and 1/7 CTH negative for acute findings.   - PTA Sertraline  - Psych consulted for encephalopathy/delirium recs in s/o prolonged QTc; appreciate assistance  - Ramelteon for sleep    - PTA gabapentin resumed at reduced dose, look to titrate to PTA dosing in the next couple of days - HS dose increased to 900mg PTA dose 1/17  - Delirium precautions; sitter discontinued 1/16      / Renal / Fluid / Electrolytes:  Hypernatremia, resolved   AUSTIN/ATN, resolved  Hypervolemia, improving  Hypokalemia, 2/2 diuresis   BL creat ~ 0.6-0.8, creatinine at baseline; adequate UOP with multiple unmeasured voids. Only Bed Weights recorded. Prosthesis now fitting, can get standing weight for baseline. Patient reports home weight around 180-182 lbs.   - Diuresis: Appears euvolemic. Continue PTA Triamterene/hydrochlorothiazide 37.5/25 mg   - Replete lytes per protocol  - Strict I/O, daily weights  - Avoid/limit nephrotoxins as able  - Lymphedema following-edema significantly improved.      Urinary retention  Appreciate urology consult 1/19  Continue flomax-patient refusing.   Attempted to remove ca catheter, monitor PVR, and CIC as detailed below on 1/20 and 1/23 without success. Patient asked for ca catheter to replaced. Will plan urology follow up 1 week after discharge for voiding trial.  Urology consult:   Frequency of SIC can be determined based on the average post-void residual:  If PVR < 150cc - no cathing needed  If PVR is 150-250cc - cath bid (qam & qhs)  If PVR is 251-350cc - cath tid  If PVR is 351-450cc - cath qid  If PVR is > 450cc - cath every 4 hours  Patient can either be managed with a indwelling ca catheter vs clean intermittent catheterization until seen in f/u in urology clinic  If CIC is chosen, then the patient will need nursing instruction on proper self-intermittent catheterization technique.  Remind patient that CIC should be performed ONLY after an attempt at spontaneous voiding is made  The patient will need to keep a journal of his cathing regimen after discharge (will need to include frequency of cathing and post-void residual amount obtained from CIC) and bring this to clinic  for review   If patient unwilling or unable to perform CIC, may leave ca catheter in place   Patient will need to f/u in urology clinic approximately 1 week after discharge for a trial of void and discussion of bladder emptying issues     GI / Nutrition:   Dysphagia   Severe malnutrition in the context of acute on chronic illness   Liquid stools   Orders Placed This Encounter      Regular Diet Adult  - Dietitian consulted and following, appreciate assistance  - PRN bowel regimen  - PRN Imodium 2 mg QID PRN      Endocrine:  Stress-induced hyperglycemia, resolved  Type 2 diabetes mellitus   Hgb A1c 5.7  - Initially managed on insulin drip postop, transitioned to sliding scale & Lantus  - Goal BG <180 for optimal healing  - Lantus 20 bedtime (PTA dose 26 unit at bedtime)      Infectious Disease:  Stress-induced leukocytosis, resolved  Sternal wound infection  Aspiration PNA: Klebsiella pneumoniae   WBC 8.3, afebrile  - Completed perioperative antibiotics  - Continue to monitor fever curve, CBC  - 1/10 C. Diff neg  - 1/14 infectious work-up for worsening leukocytosis negative thus far; follow for final results   - 1/14 BLE US neg for DVT  - 1/15 2-view CXR with low lung volumes, small bilateral pleural effusions but too small for drainage  - Defer respiratory viral panel given stable on RA, afebrile  - Klebsiella pneumoniae from sputum cultures on 12/31 and 1/9. Completed course of Zosyn from 1/9-1/20.   - Zosyn and vancomycin were restarted 1/9 for fever. Sputum culture again grew Klebsiella pneumoniae.   - 1/11 noted to have small puss drainage from sternotomy                  - CT chest on 1/12/25, not concerning for deep infection  - CT CAP 1/17 to evaluate abd pain, continued unexplained leukocytosis. Demonstrated large substernal fluid collection.   - Holding Apixaban   - Discontinue Vanc & Zosyn per Dr. Rosenbaum, and monitor off abx. Off all antibiotics since 1/20.  WBC remain normal.   - IR on Thursday 1/23/2025  per proceduralist:  Image guided access into retrosternal collection via a subxiphoid approach. Aspiration revealed scant serosanguinous return. No drain placed. Impression is consistent with hematoma. Patient declines CT scan to assess for residual collection.   - Patient refused repeat CT with contrast this AM.   - Plan to discharge home and follow up ECHO and CT chest with contrast prior to follow up appointment.      Hematology:   Acute blood loss anemia  Thrombocytopenia, resolved  Thrombocytosis, likely reactive, resolved   Therapeutic AC for PAF  Hgb stable; Plt WNL, no signs or symptoms of active bleeding  - CBC daily     Anticoagulation:   - ASA 81 mg   - PTA apixaban had been resumed for hx of PAF now s/p MAZE and LAAC with plan to defer long-term AC plan to OP Cardiology. Apixaban held on 1/20/25 with concern for growing substernal fluid collection now thought to be hematoma. Will plan to hold at discharge per Dr. Rosenbaum.      MSK/Skin:  Sternotomy  Surgical incisions  Hx RLE BKA  - Sternal precautions  - Incisional cares per protocol  - Right groin decannulation wound dehisced at apex with staple removal, packing wet to dry BID.      Sternal incision dermatitis.   Skin glue remained in place and appeared to be irritating skin. Skin glue removed with adhesive remover.   Patient reported that PRN atarax was helpful with itching, can continue with PRN atarax.   Improved today      Prophylaxis:   - Stress ulcer prophylaxis: Pantoprazole 40 mg daily for 30 days  - DVT prophylaxis: Held AC with substernal hematoma, OOB/ambulation, SCD    Prior to discharge, his pain was controlled well, he was able to perform most ADLs and ambulate with assistance, and had full return of bowel and bladder function.  On January 25, 2025, he was discharged to TCU in stable condition.    Patient discharged on aspirin:  Yes 81 mg  Patient discharged on a statin: Yes  Patient discharged on beta blocker: Yes  Patient discharged on ACE  "Inhibitor/ARB: Yes         Discharge Disposition:     Discharged to rehabilitation facility            Condition on Discharge:     Discharge condition: Stable   Discharge vitals: Blood pressure 134/63, pulse 71, temperature 98.2  F (36.8  C), temperature source Oral, resp. rate 16, height 1.803 m (5' 11\"), weight 77.2 kg (170 lb 3.1 oz), SpO2 98%.   Code status on discharge: Full Code     Vitals:    01/23/25 0400 01/24/25 0500 01/25/25 0400   Weight: 79.6 kg (175 lb 7.8 oz) 76.5 kg (168 lb 10.4 oz) 77.2 kg (170 lb 3.1 oz)       DAY OF DISCHARGE PHYSICAL EXAM:    Blood pressure 134/63, pulse 71, temperature 98.2  F (36.8  C), temperature source Oral, resp. rate 16, height 1.803 m (5' 11\"), weight 77.2 kg (170 lb 3.1 oz), SpO2 98%.  Vitals:    01/23/25 0400 01/24/25 0500 01/25/25 0400   Weight: 79.6 kg (175 lb 7.8 oz) 76.5 kg (168 lb 10.4 oz) 77.2 kg (170 lb 3.1 oz)      Weight; + 3 lbs since admit and trending stable.   24 hr Fluid status; net loss 1.6 L. UOP 2.6 L  MAPs: 79 - 89     Gen: A&Ox4, NAD  Neuro: no focal deficits   CV: RRR, normal S1 S2, no murmurs, rubs or gallops.   Pulm: CTA, no wheezing or rhonchi, normal breathing on RA  Abd: nondistended, normal BS, soft, nontender  Ext: no peripheral edema, right BKA  R groin: 5 cm x 3 cm and 1.5 cm deep open wound, no granulation tissue.  Incision: clean, dry, intact, mild light pink erythema, sternum stable  Tubes/drain sites: dressing clean and dry    BMP  Recent Labs   Lab 01/25/25  0738 01/25/25  0411 01/24/25  2220 01/24/25  1711 01/24/25  0715 01/24/25  0454 01/23/25  0814 01/23/25  0410 01/22/25  0823 01/22/25  0413   NA  --  142  --   --   --  139  --  139  --  141   POTASSIUM  --  3.7  --   --   --  3.3*  --  3.9  --  3.6  3.6   CHLORIDE  --  107  --   --   --  104  --  104  --  106   [place]  --  8.4*  --   --   --  9.0  --  8.9  --  8.3*   CO2  --  27  --   --   --  27  --  26  --  28   BUN  --  8.0  --   --   --  8.2  --  7.9*  --  7.8*   CR  --  0.60*  " --   --   --  0.60*  --  0.71  --  0.53*   * 122* 171* 134*   < > 64*   < > 116*   < > 112*    < > = values in this interval not displayed.     CBC  Recent Labs   Lab 01/25/25  0411 01/24/25  1709 01/24/25  0528 01/23/25  0410   WBC 6.8 7.9 10.3 8.3   RBC 3.15* 3.37* 3.41* 3.21*   HGB 9.3* 10.3* 10.2* 9.4*   HCT 30.0* 31.4* 32.0* 31.0*   MCV 95 93 94 97   MCH 29.5 30.6 29.9 29.3   MCHC 31.0* 32.8 31.9 30.3*   RDW 15.8* 15.9* 16.0* 16.0*    255 298 292     INR  Recent Labs   Lab 01/22/25  0413   INR 1.37*      Hepatic Panel  No lab results found in last 7 days.  Recent Labs   Lab 01/25/25  0738 01/25/25  0411 01/24/25  2220 01/24/25  1711 01/24/25  1113 01/24/25  0715   * 122* 171* 134* 80 93     Anticoagulation Dose History  More data exists         Latest Ref Rng & Units 8/10/2023 1/5/2024 12/31/2024 1/1/2025 1/2/2025 1/3/2025 1/22/2025   Recent Dosing and Labs   INR 0.85 - 1.15 1.11  1.23  1.20  1.23  1.43  1.19  1.22  1.24  1.20  1.42  1.43  1.59  2.19  1.18  1.29  1.37       Details          Multiple values from one day are sorted in reverse-chronological order               Intra-op BARTOLO, 1/2/2025-   Echocardiogram comments: Intraoperative BARTOLO was preformed for this 64 year old male undergoing CABG:   At time of echocardiographic assessment, the patient was under general anesthesia.   Inotropic Support: none   Mechanical Circulatory Support: peripheral VA ECMO @ 3.8 lpm   Rhythm: NSR     Aorta: no aneurysm, dissection, nor mobile plaque in visualized portions   of the aorta; there are calcifications noted throughout the aorta   AV: trileaflet morphology, no aortic stenosis, no aortic regurgitation,   mean gradient of 3 mmHg with a peak gradient of 7 mmHg   LV: normal chamber size, concentric LVH, decreased systolic function, RWMA   present (infero-septal wall hypokinesis, kg-lateral dyskinesis), no   thrombus   MV: normal morphology, mild mitral regurgitation (notable between A2/P2   and  A3/P3), no mitral stenosis, mean gradient of 1 mmHg with a peak   gradient of 2 mmHg, no systolic anterior motion of the mitral valve   leaflet   PV: limited visualization   RV: normal sized chamber, difficult to discern function on current ECMO   settings, no thrombus, there is a small pericardial effusion noted around the RV   TV: normal morphology, no tricuspid regurgitation   RA: no thrombus     No PFO by color flow doppler. No bubble study was performed.     Chest, Abdomen Pelvis CT 1/17/2025-   Chest:   Mediastinum: No suspicious thyroid nodules. Left PICC terminates in the SVC. Heart size is within   normal limits. Increased size of a large retrosternal fluid collection along the right heart border,   measuring 10.5 x 7.0 x 12.5 cm and exerting mass effect on the right heart. There is mild peripheral   enhancement on the anterolateral aspect of the collection.     Pleura: Small bilateral pleural effusions. No pneumothorax.      Lungs: Fluid within the bronchus intermedius and right lower lobe bronchi. Central tracheobronchial tree   is patent. Passive atelectasis in the lower lobes, greater on the right.     Abdomen and Pelvis:  Liver: Nodular liver.      Biliary System: Cholelithiasis. No gallbladder inflammation or extrahepatic biliary dilation.     Spleen: Normal.      Pancreas: No mass.      Adrenal glands: No mass or nodule.      Kidneys: Mild bilateral hydronephrosis. No obstructing calculus. The renal parenchyma is blurred by motion artifact.      Gastrointestinal tract: Normal caliber of the bowel.      Pelvis: Marked dilation of the urinary bladder. No wall thickening.     Mesentery/peritoneum/retroperitoneum: Trace intraperitoneal fluid in the [id number]. No free air.     Lymph nodes: No significant lymphadenopathy.     Vasculature: No aneurysm of the abdominal aorta.      Soft tissues: Diffuse subcutaneous anasarca. Decreased extensive subcutaneous air in the right lower   neck and right chest  wall. Findings of prior instrumentation in the bilateral inguinal regions.  Right groin post cannulation wound.     Osseous structures: Status post median sternotomy. No aggressive osseous abnormality. Spinal   stimulator leads project over the lower thoracic spine.                                                                    IMPRESSION:   1.  Increased size of a large retrosternal fluid collection which extends along the right heart border and   exerts a degree of mass effect on the heart. Mild peripheral enhancement suggests superimposed  infection.  2.  Findings of acute urinary retention with large distention of the urinary bladder. There is mild bilateral hydroureter and hydronephrosis.   3.  Small bilateral pleural effusions with associated atelectasis.  4.  Decreased subcutaneous air in the right chest wall and neck.  5.  Cirrhotic appearance of the liver.    DISCHARGE INSTRUCTIONS:  You had a sternotomy, avoid lifting anything greater than ten pounds for 8 weeks after surgery and then less than 20 pounds for an additional 4 weeks.   Do not reach backwards or use arms to push out of chair.   Do not let people pull on your arms to assist with standing.   Avoid twisting or reaching too far across your body.    Avoid strenuous activities such as bowling, vacuuming, raking, shoveling, golf or tennis for 12 weeks after your surgery.   It is okay to resume sex if you feel comfortable in doing so. You may have to try different positions with your partner.    Splint your chest incision by hugging a pillow or bringing your arms across your chest when coughing or sneezing.     No driving for 4 weeks after surgery or while on pain medication.    Shower or wash your incisions daily with soap and water (or as instructed), pat dry.   Keep wound clean and dry, showers are okay after discharge, but don't let spray hit directly on incision.   No baths or swimming for 1 month.   Cover chest tube sites to be left open to  air.   Right Groin: wet to dry nugauze strip light packing BID.   Watch for signs of infection: increased redness, tenderness, warmth or any drainage that appears infected (pus like) or is persistent.  Also a temperature > 100.5 F or chills. Call your surgeon or primary care provider's office immediately.   Remove any skin glue left on incisions after 10-14 days. This will not affect your incision and can speed up healing.    Exercise is very important in your recovery. Please follow the guidelines set up for you in your cardiac rehab classes at the hospital. If outpatient cardiac rehab was ordered for you, we highly recommend you participate. If you have problems arranging your cardiac rehab, please call [phone] for all locations, with the exception of [place], please call [phone] and [place], please call [phone].    Avoid sitting for prolonged periods of time, try to walk every hour during the day. If you have a leg incision, elevate your leg often when you are not walking.    Check your weight when you get home from the hospital and continue to check it daily through your recovery for at least a month. If you notice a weight gain of 2-3 pounds in a week, notify your primary care physician, cardiologist or surgeon.    Bowel activity may be slow after surgery. If necessary, you may take an over the counter laxative such as Milk of Magnesia or Miralax. You may have stool softeners prescribed (docusate sodium, Senokot). We recommend using stool softeners while using narcotics for pain (oxycodone/percocet, hydrocodone/vicodin, hydromorphone/dilaudid).      Wean OFF of narcotics (oxycodone, dilaudid, hydrocodone) as soon as possible. You should continue taking acetaminophen as long as you have any surgical pain as the first choice for pain control and add narcotics as necessary for pain to be tolerable.      DO NOT SMOKE.  IF YOU NEED HELP QUITTING, PLEASE TALK WITH YOUR CARDIOLOGIST OR PRIMARY  DOCTOR.    REGARDING PRESCRIPTION REFILLS.  If you need a refill on your pain medication contact us to discuss your pain and a possible one time refill.   All other medications will be adjusted, discontinued and re-filled by your primary care physician and/or your cardiologist as they were prior to your surgery. We have given you enough for one to three month with possibly one refill.    POST-OPERATIVE CLINIC VISITS  You will have a follow up visit in [facility] at the [facility] on [facility] (Pre-visit Chest CT and ECHO).  You will then return to the care of your primary provider and your cardiologist. Future medication refills should come from your PCP or Cardiologist.   You should see your primary care provider about 4 weeks after discharge.   It is important to see your cardiologist about 6 weeks after discharge.      PRE-ADMISSION MEDICATIONS:  Current Facility-Administered Medications   Medication Dose Route Frequency Provider Last Rate Last Admin    acetaminophen (TYLENOL) tablet 650 mg  650 mg Oral Q8H PRN Juni Sanderson PA-C   650 mg at 01/17/25 0828    Or    acetaminophen (TYLENOL) Suppository 650 mg  650 mg Rectal Q8H PRN Juni Sanderson PA-C        sodium chloride (NEBUSAL) 3 % neb solution 3 mL  3 mL Nebulization BID PRN Antonio Hernandez APRN CNP        And    albuterol (PROVENTIL) neb solution 2.5 mg  2.5 mg Nebulization BID PRN Antonio Hernandez APRN CNP   2.5 mg at 01/23/25 2053    aspirin (ASA) chewable tablet 81 mg  81 mg Oral or Feeding Tube Daily Katt Sprague NP   81 mg at 01/25/25 0931    baclofen (LIORESAL) tablet 10 mg  10 mg Oral or Feeding Tube Q8H Jovita Aleman APRN CNP   10 mg at 01/25/25 0931    bisacodyl (DULCOLAX) suppository 10 mg  10 mg Rectal Daily PRN Denny James PA-C        calcium carbonate (TUMS) chewable tablet 1,000 mg  1,000 mg Oral BID AC Vidal Leon MD   1,000 mg at 01/25/25 0931    dextrose  10% infusion   Intravenous Continuous PRN Sean Negron MD        glucose gel 15-30 g  15-30 g Oral Q15 Min PRN Antonio eHrnandez APRN CNP        Or    dextrose 50 % injection 25-50 mL  25-50 mL Intravenous Q15 Min PRN Antonio Hernandez APRN CNP        Or    glucagon injection 1 mg  1 mg Subcutaneous Q15 Min PRN Antonio Hernandez APRN CNP        gabapentin (NEURONTIN) capsule 300 mg  300 mg Oral Daily at 4 pm Katt Sprague NP   300 mg at 01/24/25 1511    gabapentin (NEURONTIN) capsule 900 mg  900 mg Oral At Bedtime Katt Sprague NP   900 mg at 01/24/25 2211    heparin ANTICOAGULANT injection 5,000 Units  5,000 Units Subcutaneous Q8H Antonio Hernandez APRN CNP   5,000 Units at 01/24/25 1511    heparin lock flush 10 unit/mL injection 5-20 mL  5-20 mL Intracatheter Q24H Katt Sprague, NP   10 mL at 01/24/25 1134    heparin lock flush 10 unit/mL injection 5-20 mL  5-20 mL Intracatheter Q1H PRN Katt Sprague NP   5 mL at 01/25/25 0403    hydrOXYzine HCl (ATARAX) tablet 25 mg  25 mg Oral Q6H PRN Sean Negron MD   25 mg at 01/24/25 2212    Or    hydrOXYzine HCl (ATARAX) tablet 50 mg  50 mg Oral Q6H PRSean Barrios MD        influenza trivalent vaccine high-dose for ages 65 years and greater (FLUZONE-HD) injection 0.5 mL  0.5 mL Intramuscular Prior to discharge Bernice Rosenbaum MD        insulin aspart (NovoLOG) injection (RAPID ACTING)  1-10 Units Subcutaneous TID AC Antonio Hernandez APRN CNP   1 Units at 01/21/25 1718    insulin aspart (NovoLOG) injection (RAPID ACTING)  1-7 Units Subcutaneous At Bedtime Antonio Hernandez APRN CNP        insulin glargine (LANTUS PEN) injection 15 Units  15 Units Subcutaneous At Bedtime Antonio Hernandez APRN CNP   15 Units at 01/24/25 1673    ipratropium - albuterol 0.5 mg/2.5 mg/3 mL (DUONEB) neb solution 3 mL  3 mL Nebulization Q6H PRN Antonio Hernandez, APRN CNP        Lidocaine (LIDOCARE) 4 % Patch 2 patch  2 patch Transdermal  Q24h Katt Sprague NP   1 patch at 01/22/25 2053    loperamide (IMODIUM) capsule 2 mg  2 mg Oral 4x Daily PRN Bernice Rosenbaum MD   2 mg at 01/24/25 1654    losartan (COZAAR) tablet 25 mg  25 mg Oral Daily Katt Sprague NP   25 mg at 01/25/25 0931    methocarbamol (ROBAXIN) tablet 500 mg  500 mg Oral TID PRN Katt Sprague NP   500 mg at 01/24/25 1511    metoprolol tartrate (LOPRESSOR) tablet 50 mg  50 mg Oral BID Katt Sprague NP   50 mg at 01/25/25 0931    multivitamin, therapeutic (THERA-VIT) tablet 1 tablet  1 tablet Oral or Feeding Tube Daily Renata Rey MD   1 tablet at 01/25/25 0931    naloxone (NARCAN) injection 0.2 mg  0.2 mg Intravenous Q2 Min PRN Bernice Rosenbaum MD        Or    naloxone (NARCAN) injection 0.4 mg  0.4 mg Intravenous Q2 Min PRN Bernice Rosenbaum MD        Or    naloxone (NARCAN) injection 0.2 mg  0.2 mg Intramuscular Q2 Min PRN Bernice Rosenbaum MD        Or    naloxone (NARCAN) injection 0.4 mg  0.4 mg Intramuscular Q2 Min PRN Bernice Rosenbaum MD        ondansetron (ZOFRAN ODT) ODT tab 4 mg  4 mg Oral Q6H PRN Sonia Fajardo PA-C   4 mg at 01/24/25 1848    oxyCODONE (ROXICODONE) tablet 5 mg  5 mg Oral Q6H PRN Katt Sprague NP   5 mg at 01/18/25 1844    Or    oxyCODONE IR (ROXICODONE) tablet 10 mg  10 mg Oral Q6H PRN Katt Sprague NP   10 mg at 01/25/25 0046    prochlorperazine (COMPAZINE) injection 5 mg  5 mg Intravenous Q6H PRN Sean Negron MD   5 mg at 01/22/25 2222    ramelteon (ROZEREM) tablet 8 mg  8 mg Oral At Bedtime Katt Sprague NP   8 mg at 01/24/25 2212    rosuvastatin (CRESTOR) tablet 40 mg  40 mg Oral Daily Jovita Aleman APRN CNP   40 mg at 01/25/25 0931    senna-docusate (SENOKOT-S/PERICOLACE) 8.6-50 MG per tablet 1 tablet  1 tablet Oral or Feeding Tube BID PRN Denny James PA-C        sertraline (ZOLOFT) tablet 200 mg  200 mg Oral Daily Sean Negron MD   200 mg at 01/25/25 0931    sodium  chloride (PF) 0.9% PF flush 10-20 mL  10-20 mL Intracatheter q1 min prn CelestineKatt tee M, NP        sodium chloride (PF) 0.9% PF flush 10-20 mL  10-20 mL Intracatheter q1 min prn Katt Sprague M, NP        sodium chloride (PF) 0.9% PF flush 10-40 mL  10-40 mL Intracatheter Q8H CelestineKatt tee, NP   30 mL at 01/25/25 0403    sodium chloride (PF) 0.9% PF flush 10-40 mL  10-40 mL Intracatheter Q8H CelestineKatt tee M, NP   10 mL at 01/24/25 0508    sodium chloride (PF) 0.9% PF flush 3 mL  3 mL Intracatheter Q8H Sonia Fajardo PA-C   3 mL at 01/23/25 1135    sodium chloride (PF) 0.9% PF flush 3 mL  3 mL Intracatheter q1 min prn Sonia Fajardo PA-C        tamsulosin (FLOMAX) capsule 0.4 mg  0.4 mg Oral Daily Antonio Hernandez APRN CNP   0.4 mg at 01/25/25 0931    thiamine (B-1) tablet 100 mg  100 mg Oral Daily Bernice Rosenbaum MD   100 mg at 01/25/25 0931    triamterene-HCTZ (MAXZIDE-25) 37.5-25 MG per tablet 1 tablet  1 tablet Oral Daily Sonia Fajardo PA-C   1 tablet at 01/25/25 0931        DISCHARGE MEDICATIONS:      Review of your medicines        START taking        Dose / Directions   acetaminophen 325 MG tablet  Commonly known as: TYLENOL  Used for: S/P CABG x 4      Dose: 650 mg  Take 2 tablets (650 mg) by mouth every 8 hours as needed for mild pain.  Refills: 0     aspirin 81 MG chewable tablet  Commonly known as: ASA  Used for: S/P CABG x 4  Replaces: aspirin 81 MG EC tablet      Dose: 81 mg  Take 1 tablet (81 mg) by mouth or Feeding Tube daily.  Refills: 0     glucose 40 % (400 mg/mL) gel  Used for: S/P CABG x 4      Dose: 15-30 g  Take 15-30 g by mouth every 15 minutes as needed for low blood sugar.  Refills: 0     losartan 25 MG tablet  Commonly known as: COZAAR  Used for: S/P CABG x 4      Dose: 25 mg  Take 1 tablet (25 mg) by mouth daily.  Refills: 0     methocarbamol 500 MG tablet  Commonly known as: ROBAXIN  Used for: S/P CABG x 4      Dose: 500 mg  Take 1 tablet (500 mg) by mouth 3 times  daily as needed for muscle spasms.  Refills: 0     metoprolol tartrate 50 MG tablet  Commonly known as: LOPRESSOR  Used for: S/P CABG x 4      Dose: 50 mg  Take 1 tablet (50 mg) by mouth 2 times daily.  Refills: 0     multivitamin, therapeutic Tabs tablet  Used for: S/P CABG x 4      Dose: 1 tablet  Take 1 tablet by mouth or Feeding Tube daily.  Refills: 0     ramelteon 8 MG tablet  Commonly known as: ROZEREM  Used for: S/P CABG x 4      Dose: 8 mg  Take 1 tablet (8 mg) by mouth at bedtime.  Refills: 0     senna-docusate 8.6-50 MG tablet  Commonly known as: SENOKOT-S/PERICOLACE  Used for: S/P CABG x 4      Dose: 1 tablet  Take 1 tablet by mouth or Feeding Tube 2 times daily as needed for constipation.  Refills: 0     tamsulosin 0.4 MG capsule  Commonly known as: FLOMAX  Used for: S/P CABG x 4      Dose: 0.4 mg  Take 1 capsule (0.4 mg) by mouth daily.  Refills: 0     thiamine 100 MG tablet  Commonly known as: B-1  Used for: S/P CABG x 4      Dose: 100 mg  Take 1 tablet (100 mg) by mouth daily.  Refills: 0            CONTINUE these medicines which may have CHANGED, or have new prescriptions. If we are uncertain of the size of tablets/capsules you have at home, strength may be listed as something that might have changed.        Dose / Directions   * gabapentin 300 MG capsule  Commonly known as: NEURONTIN  This may have changed:   how much to take  how to take this  when to take this  additional instructions  Used for: S/P CABG x 4      Dose: 300 mg  Take 1 capsule (300 mg) by mouth daily at 4pm.  Refills: 0     * gabapentin 300 MG capsule  Commonly known as: NEURONTIN  This may have changed: You were already taking a medication with the same name, and this prescription was added. Make sure you understand how and when to take each.  Used for: S/P CABG x 4      Dose: 900 mg  Take 3 capsules (900 mg) by mouth at bedtime.  Refills: 0     insulin glargine 100 UNIT/ML pen  Commonly known as: LANTUS PEN  This may have  changed:   how much to take  how to take this  when to take this  additional instructions  Used for: S/P CABG x 4      Dose: 15 Units  Inject 15 Units subcutaneously at bedtime.  Refills: 0           * This list has 2 medication(s) that are the same as other medications prescribed for you. Read the directions carefully, and ask your doctor or other care provider to review them with you.                CONTINUE these medicines which have NOT CHANGED        Dose / Directions   albuterol 108 (90 Base) MCG/ACT inhaler  Commonly known as: PROAIR HFA/PROVENTIL HFA/VENTOLIN HFA  Used for: Moderate persistent asthma without complication      INHALE 2 PUFFS BY MOUTH EVERY 6 HOURS AS NEEDED FOR SHORTNESS OF BREATH OR WHEEZING  Quantity: 18 g  Refills: 1     baclofen 10 MG tablet  Commonly known as: LIORESAL      Dose: 10 mg  Take 10 mg by mouth 3 times daily as needed for muscle spasms.  Refills: 0     blood glucose monitoring meter device kit  Used for: Type 2 diabetes mellitus without complication, with long-term current use of insulin (H)      Use to test blood sugar 3 times daily or as directed.  Quantity: 1 kit  Refills: 0     FreeStyle Bernard[place] 2 [place] Cher  Used for: Type 2 diabetes mellitus with diabetic polyneuropathy, with long-term current use of insulin (H)      USE TO READ BLOOD SUGARS AS PER 'S INSTRUCTIONS  Quantity: 1 each  Refills: 0     Global Ease Inject Pen Needles 32G X 4 MM miscellaneous  Used for: Type 2 diabetes mellitus without complication, with long-term current use of insulin (H)  Generic drug: insulin pen needle      USE AS DIRECTED EVERY DAY  Quantity: 100 each  Refills: 1     hydrOXYzine HCl 25 MG tablet  Commonly known as: ATARAX  Used for: Status post surgery      Dose: 25 mg  Take 1 tablet (25 mg) by mouth every 6 hours as needed for other (adjuvant pain)  Quantity: 30 tablet  Refills: 0     medical cannabis (Patient's own supply)      See Admin Instructions (The purpose of this  order is to document that the patient reports taking medical cannabis.  This is not a prescription, and is not used to certify that the patient has a qualifying medical condition.)   Flower - PRN  Refills: 0     ondansetron 4 MG tablet  Commonly known as: ZOFRAN  Used for: Nausea without vomiting, Gastroparesis      Dose: 4 mg  Take 1 tablet (4 mg) by mouth 2 times daily as needed for nausea.  Quantity: 180 tablet  Refills: 3     oxyCODONE IR 10 MG tablet  Commonly known as: ROXICODONE  Used for: Chronic pain syndrome, H/O foot surgery, Intractable right heel pain, Rupture of right Achilles tendon, initial encounter, Acquired calcaneus deformity of right ankle, Chronic pain of right ankle, Calcific Achilles tendinitis      Dose: 10 mg  Take 1 tablet (10 mg) by mouth 3 times daily as needed for severe pain.  Quantity: 20 tablet  Refills: 0     rosuvastatin 40 MG tablet  Commonly known as: CRESTOR  Used for: Hyperlipidemia LDL goal <100      Dose: 40 mg  Take 1 tablet (40 mg) by mouth daily  Quantity: 90 tablet  Refills: 3     sertraline 100 MG tablet  Commonly known as: ZOLOFT  Used for: GENERALIZED ANXIETY DIS      TAKE 2 TABLETS BY MOUTH DAILY  Quantity: 180 tablet  Refills: 2     triamterene-HCTZ 37.5-25 MG capsule  Commonly known as: DYAZIDE  Used for: Hypertension goal BP (blood pressure) < 130/80      Dose: 1 capsule  Take 1 capsule by mouth every morning  Quantity: 90 capsule  Refills: 1            STOP taking      apixaban ANTICOAGULANT 5 MG tablet  Commonly known as: ELIQUIS ANTICOAGULANT        aspirin 81 MG EC tablet  Replaced by: aspirin 81 MG chewable tablet        propranolol 40 MG tablet  Commonly known as: INDERAL                  Where to get your medicines        These medications were sent to [facility] - [place], [place] - [address]  [address] [address]      Phone: [phone]   oxyCODONE IR 10 MG tablet       CC:s Wegener, Joel Daniel  [facility]   Cardiothoracic Surgery  Office phone: [phone]  Office fax: [phone]     * a total of 50 minutes was spent on this discharge, including coordination of care, review of lab and imaging results,  patient communication and education, and discussion of care plan with consulting teams and surgeon.

## 2025-01-24 NOTE — PROGRESS NOTES
CVTS Progress Note  01/24/2025         Assessment and Plan:     Erwin Aguilar is a 64 year old male with a past medical history of DM2, HTN, PVD, and neuropathic pain who initially presented on 12/31/24 to the ED with arm pain, vomiting, and diarrhea; ACS and ST depression. He subsequently had VT arrest with 1 round of CPR, epinephrine, defibrillation. After ROSC was found to be in afib with RVR. Became hypotensive and was subsequently cannulated for VA ECMO. Found to have multivessel disease s/p CABG x4 on 1/2/25 with Dr. Rosenbaum. ECMO decannulated on 1/3/25 at bedside. ICU course complicated by large R PTX seen on chest CT on 1/4/25 s/p bedside CT placement, VAP, and encephalopathy. Extubated 1/11/25.       Cardiovascular:   Hx of CAD - s/p CABG x4 with Dr. Rosenbaum   Mixed shock - cardiogenic, vasoplegic, resolved   HTN  Pre-op VT Cardiac Arrest on VA ECMO (1/1 - 1/3)  HLD  Prolonged Qtc, improved, QTc now 492   Hx PAF on DOAC PTA - s/p MAZE & LAAC  No arrhythmias overnight, HD stable, in NSR.   Post operative ECHO 1/5 with LVEF 55-60%, normal RV function, no pericardial effusion   1/17 Repeat echo in setting of therapeutic AC showed LVEF 60-65%, small, loculated pericardial effusion without HD compromise  1/21 Echo showed EF 55-60%, large pericardial effusion without HD compromise, and RA pressure of 8 mmHg  -  mg daily  - Rosuvastatin 40 mg daily   - Metoprolol tartrate to 50 mg BID   - Losartan 25mg daily  - Diuresis as below  - AC as below    Chest tubes: Removed in ICU   TPW: Removed in ICU     Pulmonary:  Acute Hypoxic Respiratory failure, resolved   Respiratory alkalosis 2:2 agitation - resolved   R tension pneumothorax, resolved   VAP, resolved   SubQ emphysema, resolved   Right hemidiaphragm elevation  Bilateral pleural effusions, small & asymptomatic  Extubated POD 9. Now saturating well on RA.  - Supplemental O2 PRN to keep sats > 92%  - Pulm hygiene, IS, OOB/activity and deep breathing  - PRN 3%  saline nebs + Albuterol   - PRN Duobneb     Neurology:  Acute post-operative pain, improved  Encephalopathy, improved   Delirium, resolved   Anxiety   Chronic pain with opioid dependence  Neuropathy, PTA  Insomnia   Multimodal analgesia regimen:  - Acetaminophen PRN  - Lidocaine patches  - Modest dose Robaxin PRN in setting of improving delirium  - PRN hydroxyzine discontinued in s/o delirium   - PO oxycodone PRN (PTA 10mg TID PRN)  - PTA baclofen   - 1/1 vEEG without seizure activity (severe encephalopathy) and 12/31, 1/4, and 1/7 CTH negative for acute findings.   - PTA Sertraline  - Psych consulted for encephalopathy/delirium recs in s/o prolonged QTc; appreciate assistance  - Ramelteon for sleep   - PTA gabapentin resumed at reduced dose, look to titrate to PTA dosing in the next couple of days - HS dose increased to 900mg PTA dose 1/17  - Delirium precautions; sitter discontinued 1/16     / Renal / Fluid / Electrolytes:  Hypernatremia, resolved   AUSTIN/ATN, resolved  Hypervolemia, improving  Hypokalemia, 2/2 diuresis   BL creat ~ 0.6-0.8, creatinine at baseline; adequate UOP with multiple unmeasured voids. Only Bed Weights recorded. Prosthesis now fitting, can get standing weight for baseline. Patient reports home weight around 180-182 lbs.   - Diuresis: Appears euvolemic. Continue PTA Triamterene/hydrochlorothiazide 37.5/25 mg   - Replete lytes per protocol  - Strict I/O, daily weights  - Avoid/limit nephrotoxins as able  - Lymphedema following-edema significantly improved.     Urinary retention  Appreciate urology consult 1/19  Continue flomax-patient refusing.   Attempted to remove ca catheter, monitor PVR, and CIC as detailed below on 1/20 and 1/23 without success. Patient asked for ca catheter to replaced. Will plan urology follow up at discharge.   Urology consult:   Frequency of SIC can be determined based on the average post-void residual:  If PVR < 150cc - no cathing needed  If PVR is 150-250cc -  cath bid (qam & qhs)  If PVR is 251-350cc - cath tid  If PVR is 351-450cc - cath qid  If PVR is > 450cc - cath every 4 hours  Patient can either be managed with a indwelling ca catheter vs clean intermittent catheterization until seen in f/u in urology clinic  If CIC is chosen, then the patient will need nursing instruction on proper self-intermittent catheterization technique.  Remind patient that CIC should be performed ONLY after an attempt at spontaneous voiding is made  The patient will need to keep a journal of his cathing regimen after discharge (will need to include frequency of cathing and post-void residual amount obtained from CIC) and bring this to clinic for review   If patient unwilling or unable to perform CIC, may leave ca catheter in place   Patient will need to f/u in urology clinic approximately 1 week after discharge for a trial of void and discussion of bladder emptying issues    GI / Nutrition:   Dysphagia   Severe malnutrition in the context of acute on chronic illness   Liquid stools   Orders Placed This Encounter      Regular Diet Adult  - Dietitian consulted and following, appreciate assistance  - PRN bowel regimen  - PRN Imodium 2 mg QID PRN     Endocrine:  Stress-induced hyperglycemia, resolved  Type 2 diabetes mellitus   Hgb A1c 5.7  - Initially managed on insulin drip postop, transitioned to sliding scale & Lantus  - Goal BG <180 for optimal healing  - Lantus 20 bedtime (PTA dose 26 unit at bedtime)     Infectious Disease:  Stress-induced leukocytosis, resolved  Sternal wound infection  Aspiration PNA: Klebsiella pneumoniae   WBC 8.3, afebrile  - Completed perioperative antibiotics  - Continue to monitor fever curve, CBC  - 1/10 C. Diff neg  - 1/14 infectious work-up for worsening leukocytosis negative thus far; follow for final results   - 1/14 BLE US neg for DVT  - 1/15 2-view CXR with low lung volumes, small bilateral pleural effusions but too small for drainage  - Defer  respiratory viral panel given stable on RA, afebrile  - Klebsiella pneumoniae from sputum cultures on 12/31 and 1/9. Completed course of Zosyn from 1/9-1/20.   - Zosyn and vancomycin were restarted 1/9 for fever. Sputum culture again grew Klebsiella pneumoniae.   - 1/11 noted to have small puss drainage from sternotomy    - CT chest on 1/12/25, not concerning for deep infection  - CT CAP 1/17 to evaluate abd pain, continued unexplained leukocytosis. Demonstrated large substernal fluid collection.   - Holding  Apixaban   - Discontinue Vanc &Zosyn per Dr. Rosenbaum, and monitor off abx. Off all antibiotics since 1/20.  WBC remain normal.   - IR on Thursday 1/23/2025 per proceduralist:  Image guided access into retrosternal collection via a subxiphoid approach. Aspiration revealed scant serosanguinous return. No drain placed. Impression is consistent with hematoma. Patient declines CT scan to assess for residual collection.   - Patient refused repeat CT with contrast this AM.   - Plan to discharge home and follow up ECHO and CT chest with contrast prior to follow up appointment.     Hematology:   Acute blood loss anemia  Thrombocytopenia, resolved  Thrombocytosis, likely reactive, resolved   Therapeutic AC for PAF  Hgb stable; Plt WNL, no signs or symptoms of active bleeding  - CBC daily    Anticoagulation:   - ASA 81 mg   - PTA apixaban had been resumed for hx of PAF now s/p MAZE and LAAC with plan to defer long-term AC plan to OP Cardiology. Apixaban held on 1/20/25 with concern for growing substernal fluid collection now thought to be hematoma. Will plan to hold at discharge per Dr. Rosenbaum.     MSK/Skin:  Sternotomy  Surgical incisions  Hx RLE BKA  - Sternal precautions  - Incisional cares per protocol  - Right groin decannulation wound dehisced at apex with staple removal, packing wet to dry BID.     Sternal incision dermatitis.   Skin glue remained in place and appeared to be irritating skin. Skin glue removed with  "adhesive remover.   Patient reported that PRN atarax was helpful with itching, can continue with PRN atarax.   Improved today     Prophylaxis:   - Stress ulcer prophylaxis: Pantoprazole 40 mg daily for 30 days  - DVT prophylaxis: Holding AC with substernal hematoma, OOB/ambulation, SCD    Disposition:   - Transferred to  1/12  - Therapies recommending discharge to TCU   - Ready for discharge 1/23/25    Seen with Dr. Robi Hernandez DNP APRN CNP CCRN   Cardiovascular Surgery   Available on [employer] or Secure Chat   Pager [phone]           Interval History:     Doing well this AM.   Reported walking in the hallway with prosthetic greater distance then yesterday. Agreeable to therapy.   Reports pain controlled.   Mentation at baseline.   Denies SOB. Appetite good. +BM. No chest pain. Surgical pain under control.   Denies sternal popping/clicking.         Physical Exam:     Gen: NAD, resting in bed, calm, cooperative on exam, conversant  Neuro: alert & grossly oriented, no focal deficits.  CV: RRR on monitor - SR  Pulm: unlabored breathing on RA  Abd:  Non-tender. +BM  Ext: trace LE edema left leg, improved swelling in right BKA.   Sternal incision intact, no erythema or induration, sternum stable, Sternum dry today with no drainage. Dermatitis surrounding incision, improved. Remains excoriated.   R groin wound packed, dressing c/d/I. No erythema.   Tubes/drain sites: CYRIL, scabbed over, no erythema or induration         Data:    /79 (BP Location: Right arm)   Pulse 72   Temp 97.8  F (36.6  C) (Oral)   Resp 16   Ht 1.803 m (5' 11\")   Wt 76.5 kg (168 lb 10.4 oz)   SpO2 100%   BMI 23.52 kg/m      Vitals:    01/22/25 0306 01/23/25 0400 01/24/25 0500   Weight: 81.5 kg (179 lb 10.8 oz) 79.6 kg (175 lb 7.8 oz) 76.5 kg (168 lb 10.4 oz)       Imaging:       [facility]chocardiography [facility]  [address] [place] [address]     Name: SUSAN CHENG  MRN: " [id number]  : 1960  Study Date: 2025 08:55 AM  Age: 64 yrs  Gender: Male  [place] [facility] [place]  Reason For Study: Pericardial Effusion  Ordering Physician: DELPHINE HERNANDEZ  Performed By: [facility]     BSA: 2.0 m2  Height: 71 in  Weight: 180 lb  HR: 75  BP: 139/77 mmHg  ______________________________________________________________________________  Procedure  Limited Echocardiogram with two-dimensional, color and spectral Doppler. The  final echo results were communicated to Aaron Hernandez.  ______________________________________________________________________________  Interpretation Summary  Large, loculated pericardial effusion anterior to the right ventricle without  hemodynamic compromise. Slightly larger than observed on the 25 study.  ______________________________________________________________________________  Left Ventricle  Left ventricular function is normal.The ejection fraction is 55-60%.     Right Ventricle  The right ventricle is normal size. Global right ventricular function is  normal.     Mitral Valve  The mitral valve is normal.     Tricuspid Valve  The tricuspid valve is normal.     Vessels  IVC diameter and respiratory changes fall into an intermediate range  suggesting an RA pressure of 8 mmHg.     Pericardium  Large, loculated pericardial effusion anterior to the right ventricle without  hemodynamic compromise. Slightly larger than observed on the 25 study.     Miscellaneous  A left pleural effusion is present.  ______________________________________________________________________________  Report approved by: Alessio Marte MD on 2025 09:41 AM     ____________________________________________________________________              Labs:  Recent Labs   Lab 25  1113 25  0715 25  0528 25  0454 25  0814 25  0410 25  0823 25  0413   WBC  --   --  10.3  --   --  8.3  --  7.3   HGB  --   --  10.2*  --   --   9.4*  --  8.9*   MCV  --   --  94  --   --  97  --  96   PLT  --   --  298  --   --  292  --  261   INR  --   --   --   --   --   --   --  1.37*   NA  --   --   --  139  --  139  --  141   POTASSIUM  --   --   --  3.3*  --  3.9  --  3.6  3.6   CHLORIDE  --   --   --  104  --  104  --  106   CO2  --   --   --  27  --  26  --  28   BUN  --   --   --  8.2  --  7.9*  --  7.8*   CR  --   --   --  0.60*  --  0.71  --  0.53*   ANIONGAP  --   --   --  8  --  9  --  7   AFRICA  --   --   --  9.0  --  8.9  --  8.3*   GLC 80 93  --  64*   < > 116*   < > 112*    < > = values in this interval not displayed.      GLUCOSE:   Recent Labs   Lab 01/24/25  1113 01/24/25  0715 01/24/25  0454 01/23/25  2122 01/23/25  1140 01/23/25  0814   GLC 80 93 64* 190* 126* 134*

## 2025-01-24 NOTE — PROGRESS NOTES
Care Management Follow Up    Length of Stay (days): 24    Expected Discharge Date: 01/24/2025     Concerns to be Addressed: Discharge planning  Patient plan of care discussed at interdisciplinary rounds: Yes    Anticipated Discharge Disposition: Short term TCU placement     Anticipated Discharge Services: Short term TCU placement  Anticipated Discharge DME: Not applicable at this time    Patient/family educated on Medicare website which has current facility and service quality ratings: Yes  Education Provided on the Discharge Plan: Yes  Patient/Family in Agreement with the Plan:   Yes    Referrals Placed by CM/SW:    Post acute care facility's  Private pay costs discussed: Not applicable at this time    Discussed  Partnership in Safe Discharge Planning  document with patient/family: No     Handoff Completed: To be completed at time of discharge    Additional Information:  SW is following pt for discharge planning. OT and Physical Therapy are recommending short term TCU placement and per Antonio Hernandez NP, pt is medically ready for discharge.     The following TCU's are not able to accommodate pt for admit:  [facility], KALEIGH spoke with Nishi in Admissions and they remain full  - [facility],  Per Heidi, pt assessed and declined for admit  - [facility], per Ted, pt assessed and declined for admit    Decisions are pending from the following TCU's  - [facility], KALEIGH sent an email to [facility] Admissions (Brenda) inquiring as to whether or not they can accept pt for admit  - [facility], KALEIGH phoned Admissions this am and left a message inquiring as to whether or not they can accept pt for admit  - KALEIGH Esparza phoned Admissions this am and left a message inquiring as to whether or not the can accept pt for admit.    KALEIGH updated pt, Antonio Hernandez NP and pt's floor nurse (Michelle).    KALEIGH received a return email from Brenda in Admissions at [facility]  stating that they are full and the soonest potential opening in on 1/26/2025.        Next Steps: Await decisions from Blayne Alvarado and Vilma, follow up with Deniz Cedeño on 1/25/2025.  SW will continue to follow for discharge planning.    WILMER Zambrano  Social Work, 6A  Phone:  [phone]  Pager:  [phone]  1/24/2025       ISABELLE Graf

## 2025-01-24 NOTE — PROGRESS NOTES
"Care Management Discharge Note    Discharge Date:  1/25/2025       Discharge Disposition:  [facility] ([phone])    Discharge Services:  Short term TCU placement at  [facility]     Discharge DME: Not applicable at this time    Discharge Transportation: KALEIGH spoke with pt's floor nurse (Michelle) who states that pt can sit in a w/c for transport, pt can maintain an upright seated position for transport, pt can pivot transfer with his prothesis on (lift without), pt does not require supervision for behavior during transport and pt does not require 02.  Michelle states that pt has his own w/c at [facility].  Michelle states that pt should have his prothesis on at time of discharge.  KALEIGH arranged for  [facility] ([facility] [phone]) to provide pay w/c (pt's own w/c) transport with a service window  time of 12:07pm - 12:52pm,    Private pay costs discussed: Transportation costs    Does the patient's insurance plan have a 3 day qualifying hospital stay waiver?  No    PAS Confirmation Code:  [id number]  Patient/family educated on Medicare website which has current facility and service quality ratings:  Yes    Education Provided on the Discharge Plan: Yes  Persons Notified of Discharge Plans:  Pt, pt's partner (Isabella),  nursing and Antonio Hernandez  NP  Patient/Family in Agreement with the Plan:   Yes    Handoff Referral Completed: Yes, [facility] PCP: Internal handoff referral completed    Additional Information:  - Antonio Hernandez confirmed readiness for discharge  - Central Admissions for  [facility]  confirmed acceptance for admit  - KALEIGH completed \"Important Message from Medicare\" with pt  - Pt completed/signed discharge orders, discharge summary and narcotic script will be faxed to Admissions for  [facility]  by 11am on 1/15/2025.    WILMER Zambrano  Social Work, 6A  Phone:  [phone]  Pager:  [phone]  1/24/2025   "     FLORIN GrafW

## 2025-01-25 ENCOUNTER — MEDICAL CORRESPONDENCE (OUTPATIENT)
Dept: HEALTH INFORMATION MANAGEMENT | Facility: CLINIC | Age: 65
End: 2025-01-25
Payer: MEDICARE

## 2025-01-25 VITALS
TEMPERATURE: 98.2 F | RESPIRATION RATE: 16 BRPM | SYSTOLIC BLOOD PRESSURE: 134 MMHG | WEIGHT: 170.19 LBS | DIASTOLIC BLOOD PRESSURE: 63 MMHG | HEIGHT: 71 IN | HEART RATE: 71 BPM | OXYGEN SATURATION: 98 % | BODY MASS INDEX: 23.83 KG/M2

## 2025-01-25 LAB
ANION GAP SERPL CALCULATED.3IONS-SCNC: 8 MMOL/L (ref 7–15)
BUN SERPL-MCNC: 8 MG/DL (ref 8–23)
CALCIUM SERPL-MCNC: 8.4 MG/DL (ref 8.8–10.4)
CHLORIDE SERPL-SCNC: 107 MMOL/L (ref 98–107)
CREAT SERPL-MCNC: 0.6 MG/DL (ref 0.67–1.17)
EGFRCR SERPLBLD CKD-EPI 2021: >90 ML/MIN/1.73M2
ERYTHROCYTE [DISTWIDTH] IN BLOOD BY AUTOMATED COUNT: 15.8 % (ref 10–15)
GLUCOSE BLDC GLUCOMTR-MCNC: 112 MG/DL (ref 70–99)
GLUCOSE SERPL-MCNC: 122 MG/DL (ref 70–99)
HCO3 SERPL-SCNC: 27 MMOL/L (ref 22–29)
HCT VFR BLD AUTO: 30 % (ref 40–53)
HGB BLD-MCNC: 9.3 G/DL (ref 13.3–17.7)
MAGNESIUM SERPL-MCNC: 1.7 MG/DL (ref 1.7–2.3)
MCH RBC QN AUTO: 29.5 PG (ref 26.5–33)
MCHC RBC AUTO-ENTMCNC: 31 G/DL (ref 31.5–36.5)
MCV RBC AUTO: 95 FL (ref 78–100)
PLATELET # BLD AUTO: 222 10E3/UL (ref 150–450)
POTASSIUM SERPL-SCNC: 3.7 MMOL/L (ref 3.4–5.3)
RBC # BLD AUTO: 3.15 10E6/UL (ref 4.4–5.9)
SODIUM SERPL-SCNC: 142 MMOL/L (ref 135–145)
WBC # BLD AUTO: 6.8 10E3/UL (ref 4–11)

## 2025-01-25 PROCEDURE — 80048 BASIC METABOLIC PNL TOTAL CA: CPT

## 2025-01-25 PROCEDURE — G0008 ADMIN INFLUENZA VIRUS VAC: HCPCS | Performed by: STUDENT IN AN ORGANIZED HEALTH CARE EDUCATION/TRAINING PROGRAM

## 2025-01-25 PROCEDURE — 250N000011 HC RX IP 250 OP 636: Performed by: STUDENT IN AN ORGANIZED HEALTH CARE EDUCATION/TRAINING PROGRAM

## 2025-01-25 PROCEDURE — 250N000011 HC RX IP 250 OP 636: Performed by: SURGERY

## 2025-01-25 PROCEDURE — 84295 ASSAY OF SERUM SODIUM: CPT

## 2025-01-25 PROCEDURE — 250N000013 HC RX MED GY IP 250 OP 250 PS 637

## 2025-01-25 PROCEDURE — 250N000013 HC RX MED GY IP 250 OP 250 PS 637: Performed by: NURSE PRACTITIONER

## 2025-01-25 PROCEDURE — 82565 ASSAY OF CREATININE: CPT

## 2025-01-25 PROCEDURE — 250N000013 HC RX MED GY IP 250 OP 250 PS 637: Performed by: STUDENT IN AN ORGANIZED HEALTH CARE EDUCATION/TRAINING PROGRAM

## 2025-01-25 PROCEDURE — 83735 ASSAY OF MAGNESIUM: CPT | Performed by: STUDENT IN AN ORGANIZED HEALTH CARE EDUCATION/TRAINING PROGRAM

## 2025-01-25 PROCEDURE — 85014 HEMATOCRIT: CPT

## 2025-01-25 PROCEDURE — 90662 IIV NO PRSV INCREASED AG IM: CPT | Performed by: STUDENT IN AN ORGANIZED HEALTH CARE EDUCATION/TRAINING PROGRAM

## 2025-01-25 PROCEDURE — 82435 ASSAY OF BLOOD CHLORIDE: CPT

## 2025-01-25 PROCEDURE — 250N000013 HC RX MED GY IP 250 OP 250 PS 637: Performed by: PHYSICIAN ASSISTANT

## 2025-01-25 PROCEDURE — 85048 AUTOMATED LEUKOCYTE COUNT: CPT

## 2025-01-25 PROCEDURE — 250N000013 HC RX MED GY IP 250 OP 250 PS 637: Performed by: SURGERY

## 2025-01-25 RX ORDER — METHOCARBAMOL 500 MG/1
500 TABLET, FILM COATED ORAL 3 TIMES DAILY PRN
DISCHARGE
Start: 2025-01-25

## 2025-01-25 RX ORDER — NICOTINE POLACRILEX 4 MG
15-30 LOZENGE BUCCAL
DISCHARGE
Start: 2025-01-25

## 2025-01-25 RX ORDER — METOPROLOL TARTRATE 50 MG
50 TABLET ORAL 2 TIMES DAILY
DISCHARGE
Start: 2025-01-25 | End: 2025-01-29

## 2025-01-25 RX ORDER — MULTIVITAMIN,THERAPEUTIC
1 TABLET ORAL DAILY
DISCHARGE
Start: 2025-01-25

## 2025-01-25 RX ORDER — OXYCODONE HYDROCHLORIDE 10 MG/1
10 TABLET ORAL 3 TIMES DAILY PRN
Qty: 20 TABLET | Refills: 0 | Status: SHIPPED | OUTPATIENT
Start: 2025-01-25 | End: 2025-01-25

## 2025-01-25 RX ORDER — AMOXICILLIN 250 MG
1 CAPSULE ORAL 2 TIMES DAILY PRN
DISCHARGE
Start: 2025-01-25

## 2025-01-25 RX ORDER — LOSARTAN POTASSIUM 25 MG/1
25 TABLET ORAL DAILY
DISCHARGE
Start: 2025-01-25 | End: 2025-01-29

## 2025-01-25 RX ORDER — GABAPENTIN 300 MG/1
900 CAPSULE ORAL AT BEDTIME
DISCHARGE
Start: 2025-01-25

## 2025-01-25 RX ORDER — ACETAMINOPHEN 325 MG/1
650 TABLET ORAL EVERY 8 HOURS PRN
DISCHARGE
Start: 2025-01-25

## 2025-01-25 RX ORDER — RAMELTEON 8 MG/1
8 TABLET ORAL AT BEDTIME
DISCHARGE
Start: 2025-01-25

## 2025-01-25 RX ORDER — TAMSULOSIN HYDROCHLORIDE 0.4 MG/1
0.4 CAPSULE ORAL DAILY
DISCHARGE
Start: 2025-01-25

## 2025-01-25 RX ORDER — GABAPENTIN 300 MG/1
300 CAPSULE ORAL
DISCHARGE
Start: 2025-01-25

## 2025-01-25 RX ORDER — OXYCODONE HYDROCHLORIDE 10 MG/1
10 TABLET ORAL 3 TIMES DAILY PRN
Qty: 20 TABLET | Refills: 0 | Status: SHIPPED | OUTPATIENT
Start: 2025-01-25 | End: 2025-01-28

## 2025-01-25 RX ORDER — LANOLIN ALCOHOL/MO/W.PET/CERES
100 CREAM (GRAM) TOPICAL DAILY
DISCHARGE
Start: 2025-01-25

## 2025-01-25 RX ORDER — ASPIRIN 81 MG/1
81 TABLET, CHEWABLE ORAL DAILY
DISCHARGE
Start: 2025-01-25

## 2025-01-25 RX ADMIN — LOSARTAN POTASSIUM 25 MG: 25 TABLET, FILM COATED ORAL at 09:31

## 2025-01-25 RX ADMIN — ASPIRIN 81 MG CHEWABLE TABLET 81 MG: 81 TABLET CHEWABLE at 09:31

## 2025-01-25 RX ADMIN — ROSUVASTATIN CALCIUM 40 MG: 20 TABLET, FILM COATED ORAL at 09:31

## 2025-01-25 RX ADMIN — TAMSULOSIN HYDROCHLORIDE 0.4 MG: 0.4 CAPSULE ORAL at 09:31

## 2025-01-25 RX ADMIN — TRIAMTERENE AND HYDROCHLOROTHIAZIDE 1 TABLET: 37.5; 25 TABLET ORAL at 09:31

## 2025-01-25 RX ADMIN — METOPROLOL TARTRATE 50 MG: 25 TABLET, FILM COATED ORAL at 09:31

## 2025-01-25 RX ADMIN — SERTRALINE HYDROCHLORIDE 200 MG: 100 TABLET ORAL at 09:31

## 2025-01-25 RX ADMIN — THERA TABS 1 TABLET: TAB at 09:31

## 2025-01-25 RX ADMIN — BACLOFEN 10 MG: 10 TABLET ORAL at 00:46

## 2025-01-25 RX ADMIN — OXYCODONE HYDROCHLORIDE 10 MG: 10 TABLET ORAL at 00:46

## 2025-01-25 RX ADMIN — THIAMINE HCL TAB 100 MG 100 MG: 100 TAB at 09:31

## 2025-01-25 RX ADMIN — BACLOFEN 10 MG: 10 TABLET ORAL at 09:31

## 2025-01-25 RX ADMIN — INFLUENZA A VIRUS A/VICTORIA/4897/2022 IVR-238 (H1N1) ANTIGEN (FORMALDEHYDE INACTIVATED), INFLUENZA A VIRUS A/CALIFORNIA/122/2022 SAN-022 (H3N2) ANTIGEN (FORMALDEHYDE INACTIVATED), AND INFLUENZA B VIRUS B/MICHIGAN/01/2021 ANTIGEN (FORMALDEHYDE INACTIVATED) 0.5 ML: 60; 60; 60 INJECTION, SUSPENSION INTRAMUSCULAR at 09:32

## 2025-01-25 RX ADMIN — Medication 5 ML: at 04:03

## 2025-01-25 RX ADMIN — CALCIUM CARBONATE (ANTACID) CHEW TAB 500 MG 1000 MG: 500 CHEW TAB at 09:31

## 2025-01-25 ASSESSMENT — ACTIVITIES OF DAILY LIVING (ADL)
ADLS_ACUITY_SCORE: 49
ADLS_ACUITY_SCORE: 51
ADLS_ACUITY_SCORE: 49
ADLS_ACUITY_SCORE: 51
ADLS_ACUITY_SCORE: 50
ADLS_ACUITY_SCORE: 51
ADLS_ACUITY_SCORE: 49
ADLS_ACUITY_SCORE: 51

## 2025-01-25 NOTE — PLAN OF CARE
Hours of care [phone]    Neuro: A&Ox4.   Cardiac: SR. VSS.   Respiratory: Sating >92% on RA.  GI/: Adequate urine output via ca. Red urine.  Diet/appetite: Tolerating reg diet. ACHS BG.  Activity:  Lift assist/ stand and pivot to wheelchair. R BKA with prosthesis.   Pain: PRN oxycodone x1.   Skin: No new deficits noted.  LDA's: Ca. L DL PICC: HL    Plan: Continue with POC. Notify primary team with changes.      Goal Outcome Evaluation:    Problem: Risk for Delirium  Goal: Improved Attention and Thought Clarity  Outcome: Progressing  Goal: Improved Sleep  Outcome: Progressing     Problem: Cardiovascular Surgery  Goal: Optimal Coping with Heart Surgery  Outcome: Progressing

## 2025-01-25 NOTE — PLAN OF CARE
Occupational Therapy Discharge Summary    Reason for therapy discharge:    Discharged to transitional care facility.    Progress towards therapy goal(s). See goals on Care Plan in [facility] electronic health record for goal details.  Goals partially met.  Barriers to achieving goals:   discharge from facility.    Therapy recommendation(s):    Continued therapy is recommended.  Rationale/Recommendations:  Pt would benefit from continued services to increase IND in ADLs.

## 2025-01-25 NOTE — PROGRESS NOTES
DISCHARGE   Discharged to: TCU   Via: wheelchair-medical transfer  Accompanied by: Transport personnel  Discharge Instructions: diet, activity, medications, follow up appointments, when to call the MD, and what to watchout for (i.e. s/s of infection, increasing SOB, palpitations, chest pain,)  Prescriptions: To be filled by TCU pharmacy per pt's request;   Follow Up Appointments: arranged; information given  Belongings: All sent with pt  IV: out  Telemetry: off  Pt exhibits understanding of above discharge instructions; all questions answered.  Discharge Paperwork: faxed to ALICIA, RN report done at 11am with Jennifer VAUGHN

## 2025-01-25 NOTE — PLAN OF CARE
Hours of Care: [phone]     Events during shift:  - Provider notified for K of 3.3 today. No new orders at this time.  - Urine from ca noted to become more pink tinged at 1500; provider notified. Ca bag emptied at 1530, urine became progressively more red, not fully bright red/opaque. Provider notified and ordered CBC and held heparin subcut injections.  - Pt refused tele from 1430 to 1830. Provider notified.     Neuro: A&O x 4. Call light appropriate.   Cardiac: SR. VSS.   Resp: Room air. Denies chest pain and SOB.   GI/: Regular diet. ACHS BG. Intermittent nausea, prn zofran x2. BM x4 into brief today. Ca in place, draining red urine.  Skin: See PCS for assessment details.  Lines/Drains:  L PICC, hep locked.  Activity: Up assist of 2 with lift or pivot with GB and walker when wearing prosthetic. Tolerating well. Able to reposition self independently in bed. Pt encouraged to get up to chair or go on a walk with nursing staff, pt refused.  Pain: Chronic back pain and groin pain from Ca placement. Received Oxy and Robaxin PRN x2.    Plan: Discharge tomorrow to TCU. Ride set up for 12:07pm - 12:52pm

## 2025-01-25 NOTE — PROGRESS NOTES
Care Management Discharge Note    Discharge Date: 01/25/2025 with a ride time window of 12:07 - 12:52     Discharge Disposition: [facility]   [address] [place] [address]  RN to RN Report: [phone]    Discharge Services: Therapies    Discharge DME: n/a    Discharge Transportation: [employer] wheelchair transportation - [phone]    Private pay costs discussed: transportation costs    Does the patient's insurance plan have a 3 day qualifying hospital stay waiver?  No    PAS Confirmation Code:  [id number]     Patient/family educated on Medicare website which has current facility and service quality ratings: yes    Education Provided on the Discharge Plan: Yes    Persons Notified of Discharge Plans: Pt, sheeba Mason RN, provider    Patient/Family in Agreement with the Plan:  yes    Handoff Referral Completed: Yes, JUAN PCP: Internal handoff referral completed    Additional Information: KALEIGH sent message to provider CRISTINA Hernandez to ask [facility]. No response but I did see the discharge orders were entered. KALEIGH sent discharge orders to [facility] via [email or web address].    KALEIGH requested from provider SEB Crain for a printout of scripts for any controlled substances.    KALEIGH spoke with Lianna at [facility] ([phone]) who confirmed she received the discharge orders. Still waiting on hard script. Can be faxed to [phone].    KALEIGH obtained hard script and faxed it to the above #.    SHANI Handy   1/25/2025       Social Work and Care Management Department     SEARCHABLE in [employer] - search SOCIAL WORK     [place] ([phone]) Saturday and Sunday     Units: 4A Vocera, 4C Vocera, & 4E Vocera        Units: 5A 1743-1790 Vocera, 5A 2722-3750 Vocera , BMT SW 1 BMT SW 2, BMT SW 3 & BMT SW 4  5C Off Service 5401 - 5416  5C Off Service [phone]     Units: 6A Vocera & 6B Vocera      Units: 6C Vocera  chart was reviewed for overdue Proactive [facility] Encounters (ALEJANDRO)  topics  Updates were requested from care everywhere  Health Maintenance has been updated  LINKS immunization registry triggered         Units: 7A Vocera & 7B Vocera      Units: 7C Med Surg 7401 thru 7418 and 7C Med Surg 3372 thru 6975      Unit: [place] ED Vocera & [place] Obs Vocera

## 2025-01-26 ENCOUNTER — LAB REQUISITION (OUTPATIENT)
Dept: LAB | Facility: CLINIC | Age: 65
End: 2025-01-26
Payer: MEDICARE

## 2025-01-26 DIAGNOSIS — Z11.1 ENCOUNTER FOR SCREENING FOR RESPIRATORY TUBERCULOSIS: ICD-10-CM

## 2025-01-27 ENCOUNTER — PATIENT OUTREACH (OUTPATIENT)
Dept: CARE COORDINATION | Facility: CLINIC | Age: 65
End: 2025-01-27
Payer: MEDICARE

## 2025-01-27 ENCOUNTER — PATIENT OUTREACH (OUTPATIENT)
Dept: UROLOGY | Facility: CLINIC | Age: 65
End: 2025-01-27
Payer: MEDICARE

## 2025-01-27 ENCOUNTER — MYC MEDICAL ADVICE (OUTPATIENT)
Dept: FAMILY MEDICINE | Facility: CLINIC | Age: 65
End: 2025-01-27
Payer: MEDICARE

## 2025-01-27 PROCEDURE — 36415 COLL VENOUS BLD VENIPUNCTURE: CPT | Performed by: NURSE PRACTITIONER

## 2025-01-27 PROCEDURE — 86481 TB AG RESPONSE T-CELL SUSP: CPT | Performed by: NURSE PRACTITIONER

## 2025-01-27 NOTE — PROGRESS NOTES
Clinic Care Coordination Contact  Care Coordination Transition Communication    Clinical Data: Patient was hospitalized at [facility]  from 12/31/24 to 1/25/24 with diagnosis of 1. CAD with acute coronary syndrome (STEMI) with refractory VT arrest in the ED with CPR and ROSC  2. Unstable hemodynamics requiring placement on VA ECMO  3. Chronic Hx of Atrial fibrillation on DOAC, pre-op Atrial fibrillation  4. S/P 4 vessel CABG with MAZE procedure and left atrial appendage clip  5. Substernal fluid collection, Apixaban held since 1/20/25.     Assessment: Patient has transitioned to TCU/ARU for short term rehabilitation:    Facility Name:  [facility]   Transition Communication:  Notified facility of Primary Care- Care Coordination support via Epic fax.    Plan: Care Coordinator will await notification from facility staff informing of patient's discharge plans/needs. Care Coordinator will review chart and outreach to facility staff every 4 weeks and as needed.     FLORIN StephensN, RN, PHN   Care Coordinator-Ambulatory Care Management  [facility]  [phone]

## 2025-01-27 NOTE — PROGRESS NOTES
Physical Therapy Discharge Summary    Reason for therapy discharge:    Discharged to transitional care facility.    Progress towards therapy goal(s). See goals on Care Plan in [facility] electronic health record for goal details.  Goals partially met.  Barriers to achieving goals:   discharge from facility.    Therapy recommendation(s):    Continued therapy is recommended.  Rationale/Recommendations:  pt is below baseline IND with mobility 2/2 LE weakness and new post-surgical sternal precautions.

## 2025-01-27 NOTE — LETTER
Ambulatory Care Coordination to TCU Hand In Communication:     Name: Erwin Aguilar is a patient of Wegener, Joel Daniel Irwin at [facility] and I am the care coordinator.   CC Contact Information: Email: [email or web address]   Phone: [phone]     I would like to collaborate with the TCU Care team during this patient's stay; please invite me to any care conferences.     Please feel free to contact me with questions or further collaboration in discharge planning.        FLORIN StephensN, RN, PHN   Care Coordinator-Ambulatory Care Management  [facility]  [phone]

## 2025-01-27 NOTE — PROGRESS NOTES
Call placed to patient and offered 2/17/25 at 1 pm with Chad Ruelas. He accepts this and appointment scheduled.      Thank you,  Jenn Smith RN, BSN   Urology Triage Nurse

## 2025-01-28 ENCOUNTER — APPOINTMENT (OUTPATIENT)
Dept: ULTRASOUND IMAGING | Facility: CLINIC | Age: 65
End: 2025-01-28
Attending: EMERGENCY MEDICINE
Payer: MEDICARE

## 2025-01-28 ENCOUNTER — VIRTUAL VISIT (OUTPATIENT)
Dept: FAMILY MEDICINE | Facility: CLINIC | Age: 65
End: 2025-01-28
Payer: MEDICARE

## 2025-01-28 ENCOUNTER — APPOINTMENT (OUTPATIENT)
Dept: GENERAL RADIOLOGY | Facility: CLINIC | Age: 65
End: 2025-01-28
Attending: EMERGENCY MEDICINE
Payer: MEDICARE

## 2025-01-28 ENCOUNTER — HOSPITAL ENCOUNTER (OUTPATIENT)
Facility: CLINIC | Age: 65
Setting detail: OBSERVATION
Discharge: HOME | End: 2025-01-28
Attending: EMERGENCY MEDICINE | Admitting: EMERGENCY MEDICINE
Payer: MEDICARE

## 2025-01-28 VITALS
TEMPERATURE: 98.3 F | SYSTOLIC BLOOD PRESSURE: 132 MMHG | OXYGEN SATURATION: 94 % | HEIGHT: 71 IN | DIASTOLIC BLOOD PRESSURE: 69 MMHG | BODY MASS INDEX: 24.08 KG/M2 | WEIGHT: 172 LBS | HEART RATE: 73 BPM | RESPIRATION RATE: 16 BRPM

## 2025-01-28 DIAGNOSIS — R19.7 NAUSEA VOMITING AND DIARRHEA: ICD-10-CM

## 2025-01-28 DIAGNOSIS — R10.84 GENERALIZED ABDOMINAL PAIN: ICD-10-CM

## 2025-01-28 DIAGNOSIS — E83.42 HYPOMAGNESEMIA: Primary | ICD-10-CM

## 2025-01-28 DIAGNOSIS — R11.2 NAUSEA VOMITING AND DIARRHEA: ICD-10-CM

## 2025-01-28 DIAGNOSIS — Z95.1 S/P CABG X 4: ICD-10-CM

## 2025-01-28 DIAGNOSIS — Z95.1 S/P CABG (CORONARY ARTERY BYPASS GRAFT): Primary | ICD-10-CM

## 2025-01-28 PROBLEM — Z89.511 S/P BELOW KNEE AMPUTATION, RIGHT (H): Status: ACTIVE | Noted: 2025-01-28

## 2025-01-28 PROBLEM — E66.01 MORBID OBESITY (H): Status: ACTIVE | Noted: 2025-01-28

## 2025-01-28 LAB
ADV 40+41 DNA STL QL NAA+NON-PROBE: NEGATIVE
ALBUMIN SERPL BCG-MCNC: 3.5 G/DL (ref 3.5–5.2)
ALBUMIN UR-MCNC: 70 MG/DL
ALP SERPL-CCNC: 268 U/L (ref 40–150)
ALT SERPL W P-5'-P-CCNC: 33 U/L (ref 0–70)
ANION GAP SERPL CALCULATED.3IONS-SCNC: 14 MMOL/L (ref 7–15)
APPEARANCE UR: ABNORMAL
AST SERPL W P-5'-P-CCNC: 31 U/L (ref 0–45)
ASTRO TYP 1-8 RNA STL QL NAA+NON-PROBE: NEGATIVE
ATRIAL RATE - MUSE: 94 BPM
BASOPHILS # BLD AUTO: 0 10E3/UL (ref 0–0.2)
BASOPHILS NFR BLD AUTO: 0 %
BILIRUB SERPL-MCNC: 0.6 MG/DL
BILIRUB UR QL STRIP: NEGATIVE
BUN SERPL-MCNC: 11.8 MG/DL (ref 8–23)
C CAYETANENSIS DNA STL QL NAA+NON-PROBE: NEGATIVE
C DIFF GDH STL QL IA: POSITIVE
C DIFF TOX A+B STL QL IA: POSITIVE
C DIFF TOX B STL QL: POSITIVE
CALCIUM SERPL-MCNC: 8.8 MG/DL (ref 8.8–10.4)
CAMPYLOBACTER DNA SPEC NAA+PROBE: NEGATIVE
CHLORIDE SERPL-SCNC: 100 MMOL/L (ref 98–107)
COLOR UR AUTO: ABNORMAL
CREAT SERPL-MCNC: 0.58 MG/DL (ref 0.67–1.17)
CRYPTOSP DNA STL QL NAA+NON-PROBE: NEGATIVE
DIASTOLIC BLOOD PRESSURE - MUSE: NORMAL MMHG
E COLI O157 DNA STL QL NAA+NON-PROBE: ABNORMAL
E HISTOLYT DNA STL QL NAA+NON-PROBE: NEGATIVE
EAEC ASTA GENE ISLT QL NAA+PROBE: NEGATIVE
EC STX1+STX2 GENES STL QL NAA+NON-PROBE: NEGATIVE
EGFRCR SERPLBLD CKD-EPI 2021: >90 ML/MIN/1.73M2
EOSINOPHIL # BLD AUTO: 0.1 10E3/UL (ref 0–0.7)
EOSINOPHIL NFR BLD AUTO: 1 %
EPEC EAE GENE STL QL NAA+NON-PROBE: NEGATIVE
ERYTHROCYTE [DISTWIDTH] IN BLOOD BY AUTOMATED COUNT: 15.3 % (ref 10–15)
ETEC LTA+ST1A+ST1B TOX ST NAA+NON-PROBE: NEGATIVE
FLUAV RNA SPEC QL NAA+PROBE: NEGATIVE
FLUBV RNA RESP QL NAA+PROBE: NEGATIVE
G LAMBLIA DNA STL QL NAA+NON-PROBE: NEGATIVE
GAMMA INTERFERON BACKGROUND BLD IA-ACNC: 0 IU/ML
GLUCOSE BLDC GLUCOMTR-MCNC: 117 MG/DL (ref 70–99)
GLUCOSE BLDC GLUCOMTR-MCNC: 137 MG/DL (ref 70–99)
GLUCOSE SERPL-MCNC: 174 MG/DL (ref 70–99)
GLUCOSE UR STRIP-MCNC: NEGATIVE MG/DL
HCO3 SERPL-SCNC: 23 MMOL/L (ref 22–29)
HCT VFR BLD AUTO: 37.1 % (ref 40–53)
HGB BLD-MCNC: 11.8 G/DL (ref 13.3–17.7)
HGB UR QL STRIP: ABNORMAL
HOLD SPECIMEN: NORMAL
IMM GRANULOCYTES # BLD: 0 10E3/UL
IMM GRANULOCYTES NFR BLD: 0 %
INR PPP: 1.16 (ref 0.85–1.15)
INTERPRETATION ECG - MUSE: NORMAL
KETONES UR STRIP-MCNC: NEGATIVE MG/DL
LEUKOCYTE ESTERASE UR QL STRIP: ABNORMAL
LIPASE SERPL-CCNC: 86 U/L (ref 13–60)
LYMPHOCYTES # BLD AUTO: 0.2 10E3/UL (ref 0.8–5.3)
LYMPHOCYTES NFR BLD AUTO: 2 %
M TB IFN-G BLD-IMP: NEGATIVE
M TB IFN-G CD4+ BCKGRND COR BLD-ACNC: 10 IU/ML
MAGNESIUM SERPL-MCNC: 1.5 MG/DL (ref 1.7–2.3)
MCH RBC QN AUTO: 29.8 PG (ref 26.5–33)
MCHC RBC AUTO-ENTMCNC: 31.8 G/DL (ref 31.5–36.5)
MCV RBC AUTO: 94 FL (ref 78–100)
MITOGEN IGNF BCKGRD COR BLD-ACNC: 0 IU/ML
MITOGEN IGNF BCKGRD COR BLD-ACNC: 0.01 IU/ML
MONOCYTES # BLD AUTO: 0.3 10E3/UL (ref 0–1.3)
MONOCYTES NFR BLD AUTO: 4 %
MUCOUS THREADS #/AREA URNS LPF: PRESENT /LPF
NEUTROPHILS # BLD AUTO: 9 10E3/UL (ref 1.6–8.3)
NEUTROPHILS NFR BLD AUTO: 93 %
NITRATE UR QL: NEGATIVE
NOROVIRUS GI+II RNA STL QL NAA+NON-PROBE: POSITIVE
NRBC # BLD AUTO: 0 10E3/UL
NRBC BLD AUTO-RTO: 0 /100
P AXIS - MUSE: 41 DEGREES
P SHIGELLOIDES DNA STL QL NAA+NON-PROBE: NEGATIVE
PH UR STRIP: 6.5 [PH] (ref 5–7)
PHOSPHATE SERPL-MCNC: 2.4 MG/DL (ref 2.5–4.5)
PLATELET # BLD AUTO: 219 10E3/UL (ref 150–450)
POTASSIUM SERPL-SCNC: 3.5 MMOL/L (ref 3.4–5.3)
PR INTERVAL - MUSE: 150 MS
PROT SERPL-MCNC: 6.9 G/DL (ref 6.4–8.3)
QRS DURATION - MUSE: 88 MS
QT - MUSE: 368 MS
QTC - MUSE: 460 MS
QUANTIFERON MITOGEN: 10 IU/ML
QUANTIFERON NIL TUBE: 0 IU/ML
QUANTIFERON TB1 TUBE: 0 IU/ML
QUANTIFERON TB2 TUBE: 0.01
R AXIS - MUSE: -24 DEGREES
RBC # BLD AUTO: 3.96 10E6/UL (ref 4.4–5.9)
RBC URINE: >182 /HPF
RSV RNA SPEC NAA+PROBE: NEGATIVE
RVA RNA STL QL NAA+NON-PROBE: NEGATIVE
SALMONELLA SP RPOD STL QL NAA+PROBE: NEGATIVE
SAPO I+II+IV+V RNA STL QL NAA+NON-PROBE: NEGATIVE
SARS-COV-2 RNA RESP QL NAA+PROBE: NEGATIVE
SHIGELLA SP+EIEC IPAH ST NAA+NON-PROBE: NEGATIVE
SODIUM SERPL-SCNC: 137 MMOL/L (ref 135–145)
SP GR UR STRIP: 1.02 (ref 1–1.03)
SYSTOLIC BLOOD PRESSURE - MUSE: NORMAL MMHG
T AXIS - MUSE: 230 DEGREES
UROBILINOGEN UR STRIP-MCNC: NORMAL MG/DL
V CHOLERAE DNA SPEC QL NAA+PROBE: NEGATIVE
VENTRICULAR RATE- MUSE: 94 BPM
VIBRIO DNA SPEC NAA+PROBE: NEGATIVE
WBC # BLD AUTO: 9.6 10E3/UL (ref 4–11)
WBC URINE: 4 /HPF
Y ENTEROCOL DNA STL QL NAA+PROBE: NEGATIVE

## 2025-01-28 PROCEDURE — 250N000011 HC RX IP 250 OP 636: Performed by: INTERNAL MEDICINE

## 2025-01-28 PROCEDURE — 93010 ELECTROCARDIOGRAM REPORT: CPT | Performed by: EMERGENCY MEDICINE

## 2025-01-28 PROCEDURE — 81001 URINALYSIS AUTO W/SCOPE: CPT | Performed by: EMERGENCY MEDICINE

## 2025-01-28 PROCEDURE — 83690 ASSAY OF LIPASE: CPT | Performed by: EMERGENCY MEDICINE

## 2025-01-28 PROCEDURE — 87507 IADNA-DNA/RNA PROBE TQ 12-25: CPT | Performed by: HOSPITALIST

## 2025-01-28 PROCEDURE — 96365 THER/PROPH/DIAG IV INF INIT: CPT | Mod: 59 | Performed by: EMERGENCY MEDICINE

## 2025-01-28 PROCEDURE — 87637 SARSCOV2&INF A&B&RSV AMP PRB: CPT | Performed by: EMERGENCY MEDICINE

## 2025-01-28 PROCEDURE — 76705 ECHO EXAM OF ABDOMEN: CPT

## 2025-01-28 PROCEDURE — 99285 EMERGENCY DEPT VISIT HI MDM: CPT | Performed by: EMERGENCY MEDICINE

## 2025-01-28 PROCEDURE — 99207 PR NO CHARGE LOS: CPT | Mod: 95 | Performed by: FAMILY MEDICINE

## 2025-01-28 PROCEDURE — 250N000011 HC RX IP 250 OP 636: Performed by: HOSPITALIST

## 2025-01-28 PROCEDURE — 96361 HYDRATE IV INFUSION ADD-ON: CPT | Performed by: EMERGENCY MEDICINE

## 2025-01-28 PROCEDURE — 71045 X-RAY EXAM CHEST 1 VIEW: CPT

## 2025-01-28 PROCEDURE — 87493 C DIFF AMPLIFIED PROBE: CPT | Performed by: HOSPITALIST

## 2025-01-28 PROCEDURE — 76705 ECHO EXAM OF ABDOMEN: CPT | Mod: 26 | Performed by: RADIOLOGY

## 2025-01-28 PROCEDURE — 96375 TX/PRO/DX INJ NEW DRUG ADDON: CPT | Mod: 59

## 2025-01-28 PROCEDURE — 51702 INSERT TEMP BLADDER CATH: CPT | Mod: 59 | Performed by: EMERGENCY MEDICINE

## 2025-01-28 PROCEDURE — 258N000003 HC RX IP 258 OP 636: Performed by: HOSPITALIST

## 2025-01-28 PROCEDURE — 36415 COLL VENOUS BLD VENIPUNCTURE: CPT | Performed by: EMERGENCY MEDICINE

## 2025-01-28 PROCEDURE — 80051 ELECTROLYTE PANEL: CPT | Performed by: EMERGENCY MEDICINE

## 2025-01-28 PROCEDURE — 83735 ASSAY OF MAGNESIUM: CPT | Performed by: EMERGENCY MEDICINE

## 2025-01-28 PROCEDURE — 82040 ASSAY OF SERUM ALBUMIN: CPT | Performed by: EMERGENCY MEDICINE

## 2025-01-28 PROCEDURE — 96375 TX/PRO/DX INJ NEW DRUG ADDON: CPT | Performed by: EMERGENCY MEDICINE

## 2025-01-28 PROCEDURE — 85025 COMPLETE CBC W/AUTO DIFF WBC: CPT | Performed by: EMERGENCY MEDICINE

## 2025-01-28 PROCEDURE — 99285 EMERGENCY DEPT VISIT HI MDM: CPT | Mod: 25 | Performed by: EMERGENCY MEDICINE

## 2025-01-28 PROCEDURE — 96361 HYDRATE IV INFUSION ADD-ON: CPT

## 2025-01-28 PROCEDURE — 82962 GLUCOSE BLOOD TEST: CPT

## 2025-01-28 PROCEDURE — G0378 HOSPITAL OBSERVATION PER HR: HCPCS

## 2025-01-28 PROCEDURE — 87324 CLOSTRIDIUM AG IA: CPT | Performed by: HOSPITALIST

## 2025-01-28 PROCEDURE — 250N000013 HC RX MED GY IP 250 OP 250 PS 637: Performed by: HOSPITALIST

## 2025-01-28 PROCEDURE — 85610 PROTHROMBIN TIME: CPT | Performed by: EMERGENCY MEDICINE

## 2025-01-28 PROCEDURE — 71045 X-RAY EXAM CHEST 1 VIEW: CPT | Mod: 26 | Performed by: RADIOLOGY

## 2025-01-28 PROCEDURE — 250N000011 HC RX IP 250 OP 636: Performed by: EMERGENCY MEDICINE

## 2025-01-28 PROCEDURE — 84100 ASSAY OF PHOSPHORUS: CPT | Performed by: HOSPITALIST

## 2025-01-28 PROCEDURE — 96376 TX/PRO/DX INJ SAME DRUG ADON: CPT

## 2025-01-28 PROCEDURE — 258N000003 HC RX IP 258 OP 636: Performed by: EMERGENCY MEDICINE

## 2025-01-28 PROCEDURE — 96372 THER/PROPH/DIAG INJ SC/IM: CPT | Performed by: INTERNAL MEDICINE

## 2025-01-28 PROCEDURE — 93005 ELECTROCARDIOGRAM TRACING: CPT | Performed by: EMERGENCY MEDICINE

## 2025-01-28 RX ORDER — TRIAMTERENE AND HYDROCHLOROTHIAZIDE 37.5; 25 MG/1; MG/1
1 TABLET ORAL EVERY MORNING
Status: DISCONTINUED | OUTPATIENT
Start: 2025-01-28 | End: 2025-01-28 | Stop reason: HOSPADM

## 2025-01-28 RX ORDER — RAMELTEON 8 MG/1
8 TABLET ORAL
Status: DISCONTINUED | OUTPATIENT
Start: 2025-01-28 | End: 2025-01-28 | Stop reason: HOSPADM

## 2025-01-28 RX ORDER — PANTOPRAZOLE SODIUM 40 MG/1
40 TABLET, DELAYED RELEASE ORAL DAILY
Qty: 30 TABLET | Refills: 0 | Status: SHIPPED | OUTPATIENT
Start: 2025-01-28 | End: 2025-02-27

## 2025-01-28 RX ORDER — GABAPENTIN 300 MG/1
300 CAPSULE ORAL
Status: DISCONTINUED | OUTPATIENT
Start: 2025-01-28 | End: 2025-01-28 | Stop reason: HOSPADM

## 2025-01-28 RX ORDER — OXYCODONE HYDROCHLORIDE 10 MG/1
10 TABLET ORAL 3 TIMES DAILY PRN
Status: DISCONTINUED | OUTPATIENT
Start: 2025-01-28 | End: 2025-01-28 | Stop reason: HOSPADM

## 2025-01-28 RX ORDER — TAMSULOSIN HYDROCHLORIDE 0.4 MG/1
0.4 CAPSULE ORAL DAILY
Status: DISCONTINUED | OUTPATIENT
Start: 2025-01-28 | End: 2025-01-28 | Stop reason: HOSPADM

## 2025-01-28 RX ORDER — HYDROMORPHONE HYDROCHLORIDE 1 MG/ML
0.5 INJECTION, SOLUTION INTRAMUSCULAR; INTRAVENOUS; SUBCUTANEOUS ONCE
Status: COMPLETED | OUTPATIENT
Start: 2025-01-28 | End: 2025-01-28

## 2025-01-28 RX ORDER — ASPIRIN 81 MG/1
81 TABLET, CHEWABLE ORAL DAILY
Status: DISCONTINUED | OUTPATIENT
Start: 2025-01-28 | End: 2025-01-28 | Stop reason: HOSPADM

## 2025-01-28 RX ORDER — ALBUTEROL SULFATE 90 UG/1
2 INHALANT RESPIRATORY (INHALATION) EVERY 4 HOURS PRN
Status: DISCONTINUED | OUTPATIENT
Start: 2025-01-28 | End: 2025-01-28

## 2025-01-28 RX ORDER — ONDANSETRON 2 MG/ML
4 INJECTION INTRAMUSCULAR; INTRAVENOUS ONCE
Status: COMPLETED | OUTPATIENT
Start: 2025-01-28 | End: 2025-01-28

## 2025-01-28 RX ORDER — DEXTROSE MONOHYDRATE 25 G/50ML
25-50 INJECTION, SOLUTION INTRAVENOUS
Status: DISCONTINUED | OUTPATIENT
Start: 2025-01-28 | End: 2025-01-28 | Stop reason: HOSPADM

## 2025-01-28 RX ORDER — MAGNESIUM SULFATE HEPTAHYDRATE 40 MG/ML
2 INJECTION, SOLUTION INTRAVENOUS ONCE
Status: COMPLETED | OUTPATIENT
Start: 2025-01-28 | End: 2025-01-28

## 2025-01-28 RX ORDER — AMOXICILLIN 250 MG
1 CAPSULE ORAL 2 TIMES DAILY PRN
Status: DISCONTINUED | OUTPATIENT
Start: 2025-01-28 | End: 2025-01-28 | Stop reason: HOSPADM

## 2025-01-28 RX ORDER — VANCOMYCIN HYDROCHLORIDE 125 MG/1
125 CAPSULE ORAL 4 TIMES DAILY
Qty: 40 CAPSULE | Refills: 0 | Status: SHIPPED | OUTPATIENT
Start: 2025-01-28 | End: 2025-02-07

## 2025-01-28 RX ORDER — GABAPENTIN 300 MG/1
900 CAPSULE ORAL AT BEDTIME
Status: DISCONTINUED | OUTPATIENT
Start: 2025-01-28 | End: 2025-01-28 | Stop reason: HOSPADM

## 2025-01-28 RX ORDER — ONDANSETRON 2 MG/ML
4 INJECTION INTRAMUSCULAR; INTRAVENOUS EVERY 6 HOURS PRN
Status: DISCONTINUED | OUTPATIENT
Start: 2025-01-28 | End: 2025-01-28 | Stop reason: HOSPADM

## 2025-01-28 RX ORDER — ONDANSETRON 4 MG/1
4 TABLET, ORALLY DISINTEGRATING ORAL EVERY 6 HOURS PRN
Status: DISCONTINUED | OUTPATIENT
Start: 2025-01-28 | End: 2025-01-28 | Stop reason: HOSPADM

## 2025-01-28 RX ORDER — ACETAMINOPHEN 325 MG/1
650 TABLET ORAL EVERY 8 HOURS PRN
Status: DISCONTINUED | OUTPATIENT
Start: 2025-01-28 | End: 2025-01-28 | Stop reason: HOSPADM

## 2025-01-28 RX ORDER — ALBUTEROL SULFATE 0.83 MG/ML
2.5 SOLUTION RESPIRATORY (INHALATION) EVERY 4 HOURS PRN
Status: DISCONTINUED | OUTPATIENT
Start: 2025-01-28 | End: 2025-01-28 | Stop reason: HOSPADM

## 2025-01-28 RX ORDER — PROCHLORPERAZINE MALEATE 5 MG/1
5 TABLET ORAL EVERY 6 HOURS PRN
Status: DISCONTINUED | OUTPATIENT
Start: 2025-01-28 | End: 2025-01-28 | Stop reason: HOSPADM

## 2025-01-28 RX ORDER — ENOXAPARIN SODIUM 100 MG/ML
40 INJECTION SUBCUTANEOUS DAILY
Status: DISCONTINUED | OUTPATIENT
Start: 2025-01-28 | End: 2025-01-28 | Stop reason: HOSPADM

## 2025-01-28 RX ORDER — LOSARTAN POTASSIUM 25 MG/1
25 TABLET ORAL DAILY
Status: DISCONTINUED | OUTPATIENT
Start: 2025-01-28 | End: 2025-01-28 | Stop reason: HOSPADM

## 2025-01-28 RX ORDER — AMOXICILLIN 250 MG
2 CAPSULE ORAL 2 TIMES DAILY PRN
Status: DISCONTINUED | OUTPATIENT
Start: 2025-01-28 | End: 2025-01-28 | Stop reason: HOSPADM

## 2025-01-28 RX ORDER — METOPROLOL TARTRATE 50 MG
50 TABLET ORAL 2 TIMES DAILY
Status: DISCONTINUED | OUTPATIENT
Start: 2025-01-28 | End: 2025-01-28 | Stop reason: HOSPADM

## 2025-01-28 RX ORDER — BACLOFEN 10 MG/1
10 TABLET ORAL 3 TIMES DAILY PRN
Status: DISCONTINUED | OUTPATIENT
Start: 2025-01-28 | End: 2025-01-28 | Stop reason: HOSPADM

## 2025-01-28 RX ORDER — ROSUVASTATIN CALCIUM 10 MG/1
40 TABLET, COATED ORAL DAILY
Status: DISCONTINUED | OUTPATIENT
Start: 2025-01-28 | End: 2025-01-28 | Stop reason: HOSPADM

## 2025-01-28 RX ORDER — NICOTINE POLACRILEX 4 MG
15-30 LOZENGE BUCCAL
Status: DISCONTINUED | OUTPATIENT
Start: 2025-01-28 | End: 2025-01-28 | Stop reason: HOSPADM

## 2025-01-28 RX ORDER — OXYCODONE HYDROCHLORIDE 10 MG/1
10 TABLET ORAL 3 TIMES DAILY PRN
Qty: 30 TABLET | Refills: 0 | Status: SHIPPED | OUTPATIENT
Start: 2025-01-28

## 2025-01-28 RX ADMIN — HYDROMORPHONE HYDROCHLORIDE 0.5 MG: 1 INJECTION, SOLUTION INTRAMUSCULAR; INTRAVENOUS; SUBCUTANEOUS at 03:54

## 2025-01-28 RX ADMIN — METOPROLOL TARTRATE 50 MG: 50 TABLET, FILM COATED ORAL at 08:45

## 2025-01-28 RX ADMIN — OXYCODONE HYDROCHLORIDE 10 MG: 10 TABLET ORAL at 14:12

## 2025-01-28 RX ADMIN — SERTRALINE HYDROCHLORIDE 200 MG: 50 TABLET ORAL at 08:45

## 2025-01-28 RX ADMIN — FAMOTIDINE 20 MG: 10 INJECTION, SOLUTION INTRAVENOUS at 03:57

## 2025-01-28 RX ADMIN — PROCHLORPERAZINE EDISYLATE 5 MG: 5 INJECTION INTRAMUSCULAR; INTRAVENOUS at 08:45

## 2025-01-28 RX ADMIN — ROSUVASTATIN CALCIUM 40 MG: 10 TABLET, FILM COATED ORAL at 08:45

## 2025-01-28 RX ADMIN — SODIUM CHLORIDE 1000 ML: 9 INJECTION, SOLUTION INTRAVENOUS at 03:59

## 2025-01-28 RX ADMIN — MAGNESIUM SULFATE HEPTAHYDRATE 2 G: 40 INJECTION, SOLUTION INTRAVENOUS at 04:19

## 2025-01-28 RX ADMIN — TAMSULOSIN HYDROCHLORIDE 0.4 MG: 0.4 CAPSULE ORAL at 08:46

## 2025-01-28 RX ADMIN — ONDANSETRON 4 MG: 2 INJECTION, SOLUTION INTRAMUSCULAR; INTRAVENOUS at 14:13

## 2025-01-28 RX ADMIN — ONDANSETRON 4 MG: 2 INJECTION, SOLUTION INTRAMUSCULAR; INTRAVENOUS at 03:56

## 2025-01-28 RX ADMIN — SODIUM CHLORIDE, POTASSIUM CHLORIDE, SODIUM LACTATE AND CALCIUM CHLORIDE 1000 ML: 600; 310; 30; 20 INJECTION, SOLUTION INTRAVENOUS at 09:48

## 2025-01-28 RX ADMIN — ASPIRIN 81 MG CHEWABLE TABLET 81 MG: 81 TABLET CHEWABLE at 08:45

## 2025-01-28 RX ADMIN — ENOXAPARIN SODIUM 40 MG: 40 INJECTION SUBCUTANEOUS at 14:17

## 2025-01-28 ASSESSMENT — ACTIVITIES OF DAILY LIVING (ADL)
ADLS_ACUITY_SCORE: 58

## 2025-01-28 NOTE — PROGRESS NOTES
[facility] GERIATRICS    PRIMARY CARE PROVIDER AND CLINIC:  Joel Daniel Wegener, MD, [address] [address] / [facility] [address]***  Chief Complaint   Patient presents with    Hospital F/U      Lydia Medical Record Number:  [id number]  Place of Service where encounter took place:  [facility] (TCU)(SNF) [[id number]]    Erwin Aguilar  is a 64 year old  (1960), admitted to the above facility from  [facility]. Hospital stay 1/28/25 through ***..   HPI:    ***    CODE STATUS/ADVANCE DIRECTIVES DISCUSSION:  Full Code  CPR/Full code   ALLERGIES:   Allergies   Allergen Reactions    Levaquin Anaphylaxis and Rash     Swelling in lip and tongue felt thick    Lisinopril Anaphylaxis     Swollen tongue; inability to swallow; drooling; hives; swollen face, neck, angioedema    Acetaminophen      Hx of cirrhosis     Amlodipine      Swelling, hives, possible angioedema      Morphine      b/p dropped and arms went numb  Hypotension    Quinolones Hives    Spironolactone Unknown     Pt believes himself to be allergic to it; cannot find his old records of this.-mjc     Bactrim [Sulfamethoxazole-Trimethoprim] Rash      PAST MEDICAL HISTORY:   Past Medical History:   Diagnosis Date    Actinic keratosis     aldara    Anxiety 12/1997    Cancer (H)     squamous cell skin CA    Cauda equina spinal cord injury (H)     Chronic sinusitis 05/01/2016    Cirrhosis of liver (H) 04/01/2014    Not biopsy-proven as of 4/1/14.      Depressive disorder     Diastasis recti     Esophageal reflux     Esophageal varices in cirrhosis (H) 04/01/2014    Hospitalized for UGI blee 3/28/14, endoscopy revealed bleeding varices.    Essential hypertension, benign     Intermittent asthma     Mild depression     Mixed hyperlipidemia     Nasal polyps 05/01/2016    Nausea vomiting and diarrhea 1/28/2025    Osteoarthritis of lumbar spine, unspecified spinal osteoarthritis  complication status     Other chronic pain     Paroxysmal atrial fibrillation (H) 09/22/2021    SCCA (squamous cell carcinoma) of skin     Seasonal allergic conjunctivitis     Type II or unspecified type diabetes mellitus without mention of complication, not stated as uncontrolled     Unspecified site of spinal cord injury without evidence of spinal bone injury     due to back surgery      PAST SURGICAL HISTORY:   has a past surgical history that includes surgical history of -  (01/2002); surgical history of -  (1999); APPENDECTOMY (1974); SQUAMOUS CELL CARCINOMA AG (10/2007); surgical history of -  (+); neuro stimulator (2010); Herniorrhaphy umbilical (11/08/2012); Endoscopy upper, colonoscopy, combined (10/19/2011); Transposition ulnar nerve (elbow); Esophagoscopy, gastroscopy, duodenoscopy (EGD), combined (03/28/2014); Esophagoscopy, gastroscopy, duodenoscopy (EGD), combined (06/09/2014); Esophagoscopy, gastroscopy, duodenoscopy (EGD), combined (07/24/2014); Esophagoscopy, gastroscopy, duodenoscopy (EGD), combined (N/A, 10/31/2014); Colonoscopy (N/A, 05/12/2016); Esophagoscopy, gastroscopy, duodenoscopy (EGD), combined (N/A, 05/12/2016); Tonsillectomy (10/1964); ENT surgery (01/2016); back surgery (08/2009); Remove Generator Stimulator (Location) (N/A, 06/28/2018); Insert stimulator dorsal column (N/A, 06/28/2018); Esophagoscopy, gastroscopy, duodenoscopy (EGD), combined (N/A, 08/02/2018); Repair tendon achilles (Right, 11/11/2020); Abdomen surgery (2014); Decompression cubital tunnel (Left, 08/31/2023); Release carpal tunnel (Left, 08/31/2023); Release trigger finger (Left, 08/31/2023); IR Lower Extremity Angiogram Right (04/23/2024); Angiogram (Right, 04/23/2024); Amputate leg below knee (Right, 05/09/2024); Bypass graft artery coronary (N/A, 01/02/2025); Remove Extracorporal Membrane Oxygenator Adult (N/A, 01/03/2025); PICC/Midline Placement (Left, 01/16/2025); and IR Fluoro 0-1 Hour (1/23/2025).  FAMILY  HISTORY: family history includes Breast Cancer in his mother; Cancer in his father and mother; Diabetes in his brother, brother, and brother; Other Cancer in his mother; Snoring in his father.  SOCIAL HISTORY:   reports that he quit smoking about 40 years ago. His smoking use included cigarettes. He started smoking about 41 years ago. He has been exposed to tobacco smoke. He has never used smokeless tobacco. He reports that he does not currently use alcohol. He reports current drug use. Frequency: 2.00 times per week. Drug: Marijuana.  Patient's living condition: lives alone    Post Discharge Medication Reconciliation Status:   MED REC REQUIRED{TIP  Click the link below to document or use med rec list, use list to pull in response :[id number]}  Post Medication Reconciliation Status: {MED REC LIST:634360}       No current facility-administered medications for this visit.     Current Outpatient Medications   Medication Sig Dispense Refill    acetaminophen (TYLENOL) 325 MG tablet Take 2 tablets (650 mg) by mouth every 8 hours as needed for mild pain.      albuterol (PROAIR HFA/PROVENTIL HFA/VENTOLIN HFA) 108 (90 Base) MCG/ACT inhaler INHALE 2 PUFFS BY MOUTH EVERY 6 HOURS AS NEEDED FOR SHORTNESS OF BREATH OR WHEEZING 18 g 1    aspirin (ASA) 81 MG chewable tablet Take 1 tablet (81 mg) by mouth or Feeding Tube daily.      baclofen (LIORESAL) 10 MG tablet Take 10 mg by mouth 3 times daily as needed for muscle spasms.      blood glucose monitoring (FREESTYLE FREEDOM LITE) meter device kit Use to test blood sugar 3 times daily or as directed. 1 kit 0    Continuous Blood Gluc  (FREESTYLE CACHORRO 2 READER) GASTON USE TO READ BLOOD SUGARS AS PER 'S INSTRUCTIONS 1 each 0    gabapentin (NEURONTIN) 300 MG capsule Take 1 capsule (300 mg) by mouth daily at 4pm.      gabapentin (NEURONTIN) 300 MG capsule Take 3 capsules (900 mg) by mouth at bedtime.      glucose 40 % (400 mg/mL) gel Take 15-30 g by mouth every 15 minutes  as needed for low blood sugar.      insulin glargine (LANTUS PEN) 100 UNIT/ML pen Inject 15 Units subcutaneously at bedtime.      insulin pen needle (GLOBAL EASE INJECT PEN NEEDLES) 32G X 4 MM miscellaneous USE AS DIRECTED EVERY  each 1    losartan (COZAAR) 25 MG tablet Take 1 tablet (25 mg) by mouth daily.      medical cannabis (Patient's own supply) See Admin Instructions (The purpose of this order is to document that the patient reports taking medical cannabis.  This is not a prescription, and is not used to certify that the patient has a qualifying medical condition.)   Flower - PRN      methocarbamol (ROBAXIN) 500 MG tablet Take 1 tablet (500 mg) by mouth 3 times daily as needed for muscle spasms.      metoprolol tartrate (LOPRESSOR) 50 MG tablet Take 1 tablet (50 mg) by mouth 2 times daily.      multivitamin, therapeutic (THERA-VIT) TABS tablet Take 1 tablet by mouth or Feeding Tube daily.      ondansetron (ZOFRAN) 4 MG tablet Take 1 tablet (4 mg) by mouth 2 times daily as needed for nausea. 180 tablet 3    oxyCODONE IR (ROXICODONE) 10 MG tablet Take 1 tablet (10 mg) by mouth 3 times daily as needed for severe pain. 20 tablet 0    ramelteon (ROZEREM) 8 MG tablet Take 1 tablet (8 mg) by mouth at bedtime.      rosuvastatin (CRESTOR) 40 MG tablet Take 1 tablet (40 mg) by mouth daily 90 tablet 3    senna-docusate (SENOKOT-S/PERICOLACE) 8.6-50 MG tablet Take 1 tablet by mouth or Feeding Tube 2 times daily as needed for constipation.      sertraline (ZOLOFT) 100 MG tablet TAKE 2 TABLETS BY MOUTH DAILY 180 tablet 2    tamsulosin (FLOMAX) 0.4 MG capsule Take 1 capsule (0.4 mg) by mouth daily.      thiamine (B-1) 100 MG tablet Take 1 tablet (100 mg) by mouth daily.      triamterene-HCTZ (DYAZIDE) 37.5-25 MG capsule Take 1 capsule by mouth every morning 90 capsule 1     Facility-Administered Medications Ordered in Other Visits   Medication Dose Route Frequency Provider Last Rate Last Admin    acetaminophen  (TYLENOL) tablet 650 mg  650 mg Oral Q8H PRN Jayson Tim MD        albuterol (PROVENTIL) neb solution 2.5 mg  2.5 mg Nebulization Q4H PRN Jayson Tim MD        aspirin (ASA) chewable tablet 81 mg  81 mg Oral or Feeding Tube Daily Jayson Tim MD   81 mg at 01/28/25 0845    baclofen (LIORESAL) tablet 10 mg  10 mg Oral TID PRN Jayson Tim MD        glucose gel 15-30 g  15-30 g Oral Q15 Min PRN Jayson Tim MD        Or    dextrose 50 % injection 25-50 mL  25-50 mL Intravenous Q15 Min PRN Jayson Tim MD        Or    glucagon injection 1 mg  1 mg Subcutaneous Q15 Min PRN Jayson Tim MD        enoxaparin ANTICOAGULANT (LOVENOX) injection 40 mg  40 mg Subcutaneous Daily Vincenzo Lovelace MD   40 mg at 01/28/25 1417    gabapentin (NEURONTIN) capsule 300 mg  300 mg Oral Daily at 4 pm Jayson Tim MD        gabapentin (NEURONTIN) capsule 900 mg  900 mg Oral At Bedtime Jayson Tim MD        insulin aspart (NovoLOG) injection (RAPID ACTING)  1-3 Units Subcutaneous TID AC Jayson iTm MD        insulin aspart (NovoLOG) injection (RAPID ACTING)  1-3 Units Subcutaneous At Bedtime Jayson Tim MD        insulin glargine (LANTUS PEN) injection 7 Units  7 Units Subcutaneous At Bedtime Jayson Tim MD        lactated ringers BOLUS 1,000 mL  1,000 mL Intravenous Once Jayson Tim  mL/hr at 01/28/25 0948 1,000 mL at 01/28/25 0948    [Held by provider] losartan (COZAAR) tablet 25 mg  25 mg Oral Daily Jayson Tim MD        metoprolol tartrate (LOPRESSOR) tablet 50 mg  50 mg Oral BID Jayson Tim MD   50 mg at 01/28/25 0845    ondansetron (ZOFRAN ODT) ODT tab 4 mg  4 mg Oral Q6H PRN Jayson Tim MD        Or    ondansetron (ZOFRAN) injection 4 mg  4 mg Intravenous Q6H PRN Jayson Tim MD   4 mg at 01/28/25 1413    oxyCODONE IR (ROXICODONE) tablet 10 mg  10 mg Oral TID PRN Jayson Tim MD   10 mg at 01/28/25 1412    prochlorperazine (COMPAZINE) injection 5 mg  5 mg  "Intravenous Q6H PRN Jayson Tim MD        Or    prochlorperazine (COMPAZINE) tablet 5 mg  5 mg Oral Q6H PRN Jayson Tim MD        ramelteon (ROZEREM) tablet 8 mg  8 mg Oral At Bedtime PRN Jayson Tim MD        rosuvastatin (CRESTOR) tablet 40 mg  40 mg Oral Daily Jayson Tim MD   40 mg at 01/28/25 0845    senna-docusate (SENOKOT-S/PERICOLACE) 8.6-50 MG per tablet 1 tablet  1 tablet Oral BID PRN Jayson Tim MD        Or    senna-docusate (SENOKOT-S/PERICOLACE) 8.6-50 MG per tablet 2 tablet  2 tablet Oral BID PRN Jayson Tim MD        sertraline (ZOLOFT) tablet 200 mg  200 mg Oral Daily Jayson Tim MD   200 mg at 01/28/25 0845    tamsulosin (FLOMAX) capsule 0.4 mg  0.4 mg Oral Daily Jayson Tim MD   0.4 mg at 01/28/25 0846    [Held by provider] triamterene-HCTZ (MAXZIDE-25) 37.5-25 MG per tablet 1 tablet  1 tablet Oral QAM Jayson Tim MD           ROS:  10 point ROS of systems including Constitutional, Eyes, Respiratory, Cardiovascular, Gastroenterology, Genitourinary, Integumentary, Musculoskeletal, Psychiatric were all negative except for pertinent positives noted in my HPI.    Vitals:  There were no vitals taken for this visit.  Exam:  GENERAL APPEARANCE:  {penitentiary GENERAL APPEARANCE:607668}  ENT:  {penitentiary ENT PE:220407::\"Mouth and posterior oropharynx normal, moist mucous membranes\"}  EYES:  {penitentiary EYES PE :673333::\"EOM, conjunctivae, lids, pupils and irises normal\"}  RESP:  {penitentiary RESP PE:430762}  CV:  {penitentiary CV PE:062609}  ABDOMEN:  {penitentiary GI PE:185501::\"normal bowel sounds, soft, nontender, no hepatosplenomegaly or other masses\"}  M/S:   {penitentiary M/S PE:082116}  SKIN:  {NURSING HOME SKIN PE:400975}  NEURO:   {penitentiary NEURO PE:858278}  PSYCH:  {penitentiary PSYCH PE:827047}    Lab/Diagnostic data:  {fgslab:111508}    ASSESSMENT/PLAN:    {S DX2:196587}    Orders:  {sorders:460438}  ***    Electronically signed by:  Janessa CRUZ" ERNA Mo ***

## 2025-01-28 NOTE — PROGRESS NOTES
[facility]    Medicine Progress Note - Hospitalist Service, GOLD TEAM 13    Date of Admission:  1/28/2025    Assessment & Plan      Erwin Aguilar is a 64 year old male admitted on 1/28/2025. He has a complex medical history including a recently admission to the ICU from 12/31-1/25 for STEMI with VT arrest requiring CPR and requiring ECMO s/p CABG, who presented 1/27 from TCU with several hours of vomiting, diarrhea, and abdominal pain.    Somewhat improved overnight in ED  stable  Still with anorexia and fatigue  Dispo day to day. If continues to improve, can begin dispo planning to TCU    #Suspected gastroenteritis  #Mild alkaline phosphatase with negative US   Stool samples sent this am 1/28  LR 1L x10hr on arrival. Now oral fluids  BP meds held on arrival.   Trend alk phos   CT was considered and declined on arrival. Exam non-focal. Re-consider CT if N/V or abd pain worsens    #CAD s/p CABG  # HTN  - continue with home asa, rosuvastatin, metoprolol  Held prior to admission losartan and triamterene/hydrochlorothiazide on arrival. BP currently 127/71. Restart as needed.   PT/OT eval for TCU placement (was discharged to TCU on 1/25)    #Anxiety  #Chronic pain syndrome  #Major depressive disorder  -Continue gabapentin 300mg daily at 1600 and 900mg at HS  -Continue Tylenol 650mg every 8 hours PRN  -Continue oxycodone 10mg TID PRN  -Continue robaxin 500mg TID PRN  -Continue PTA baclofen 10mg TID PRN as directed  -Continue PTA sertraline 200mg daily as directed  -Continue ramelteon 8mg for sleep at HS as directed    #DM II  - decreased home 15U glargine at bedtime to 7U given low PO, LDISS  -trend fingersticks and uptitrate to home glargine 15 when needed.     #Rodriguez catheter in place, chronic, 2/2 urinary retention  - urology follow-up for potential TOV 2/17/25  *continue with home flomax           Observation Goals: -diagnostic tests and consults completed and resulted,  -vital signs normal or at patient baseline, Nurse to notify provider when observation goals have been met and patient is ready for discharge.  Diet: Clear Liquid Diet    DVT Prophylaxis: Enoxaparin (Lovenox) SQ  Rodriguez Catheter: PRESENT, indication:    Lines: None     Cardiac Monitoring: None  Code Status: Full Code      Clinically Significant Risk Factors Present on Admission             # Hypomagnesemia: Lowest Mg = 1.5 mg/dL in last 2 days, will replace as needed    # Coagulation Defect: INR = 1.16 (Ref range: 0.85 - 1.15) and/or PTT = 34 Seconds (Ref range: 22 - 38 Seconds), will monitor for bleeding  # Drug Induced Platelet Defect: home medication list includes an antiplatelet medication   # Hypertension: Noted on problem list               # Financial/Environmental Concerns:    # Asthma: noted on problem list  # History of CABG: noted on surgical history       Social Drivers of Health    Food Insecurity: Unknown (1/2/2025)    Food Insecurity     Within the past 12 months, did you worry that your food would run out before you got money to buy more?: Patient unable to answer     Within the past 12 months, did the food you bought just not last and you didn t have money to get more?: Patient unable to answer   Housing Stability: Unknown (1/2/2025)    Housing Stability     Do you have housing? : Patient unable to answer     Are you worried about losing your housing?: Patient unable to answer   Tobacco Use: Medium Risk (1/28/2025)    Patient History     Smoking Tobacco Use: Former     Smokeless Tobacco Use: Never     Passive Exposure: Past   Financial Resource Strain: Unknown (1/2/2025)    Financial Resource Strain     Within the past 12 months, have you or your family members you live with been unable to get utilities (heat, electricity) when it was really needed?: Patient unable to answer   Transportation Needs: Unknown (1/2/2025)    Transportation Needs     Within the past 12 months, has lack of transportation  kept you from medical appointments, getting your medicines, non-medical meetings or appointments, work, or from getting things that you need?: Patient unable to answer   Physical Activity: Inactive (12/31/2020)    Exercise Vital Sign     Days of Exercise per Week: 0 days     Minutes of Exercise per Session: 0 min   Interpersonal Safety: Unknown (1/2/2025)    Interpersonal Safety     Do you feel physically and emotionally safe where you currently live?: Patient unable to answer     Within the past 12 months, have you been hit, slapped, kicked or otherwise physically hurt by someone?: Patient unable to answer     Within the past 12 months, have you been humiliated or emotionally abused in other ways by your partner or ex-partner?: Patient unable to answer   Social Connections: Unknown (12/31/2020)    Social Connection and Isolation Panel [NHANES]     Marital Status:           Disposition Plan     Medically Ready for Discharge: Anticipated Tomorrow             Vincenzo Lovelace MD  Hospitalist Service, [facility] TEAM [address] [place] [address]  Securely message with [employer] (more info)  Text page via [facility] Paging/Directory   See signed in provider for up to date coverage information  ______________________________________________________________________    Interval History   No new events  Emesis improving overnight  Still with soft stool  Tolerating clears but still with decreased diet    Physical Exam   Vital Signs: Temp: 97.9  F (36.6  C) Temp src: Oral BP: 139/63 Pulse: 94   Resp: 18 SpO2: 96 % O2 Device: None (Room air)    Weight: 172 lbs 0 oz    General Appearance: A&Ox3 in NAD  Respiratory: ctab  Cardiovascular: rrr  GI: sft minimally tender without rebound  Skin: wwp  Other: Nl affect     Medical Decision Making       30 MINUTES SPENT BY ME on the date of service doing chart review, history, exam, documentation & further activities per the note.      Data

## 2025-01-28 NOTE — ED TRIAGE NOTES
"Pt BIBA with CC of nausea, vomiting and abd pain. Pt endorses 15x \"yellow\" emesis in last 24 hrs.      Triage Assessment (Adult)       Row Name 01/28/25 0300          Triage Assessment    Airway WDL WDL        Respiratory WDL    Respiratory WDL WDL        Skin Circulation/Temperature WDL    Skin Circulation/Temperature WDL WDL        Cardiac WDL    Cardiac WDL WDL        Peripheral/Neurovascular WDL    Peripheral Neurovascular WDL WDL        Cognitive/Neuro/Behavioral WDL    Cognitive/Neuro/Behavioral WDL WDL        Marry Coma Scale    Best Eye Response 4-->(E4) spontaneous     Best Motor Response 6-->(M6) obeys commands     Best Verbal Response 5-->(V5) oriented     [place] Coma Scale Score 15                     "

## 2025-01-28 NOTE — PROGRESS NOTES
Erwin is a 64 year old who is being evaluated via a billable video visit.    How would you like to obtain your AVS? MyChart  If the video visit is dropped, the invitation should be resent by: Text to cell phone: [phone]  Will anyone else be joining your video visit? No      Assessment & Plan     S/P CABG (coronary artery bypass graft)  Connected by video.  Visit schedule to follow up recent medical events/cabg but he is actually currently in the emergency department (in a patient room now waiting for doctor) due to persistent nausea .  He agrees best to re-schedule once out of hospital.         MED REC REQUIRED  Post Medication Reconciliation Status:         Subjective   Erwin is a 64 year old, presenting for the following health issues:  Recheck Medication and Consult (Nursing Home and Medicare Questions )        6/4/2024     3:01 PM   Additional Questions   Roomed by Oma KUMAR   Accompanied by n/a       Video Start Time: n/a    HPI                 Objective    Vitals - Patient Reported  Systolic (Patient Reported):  (n/a)  Diastolic (Patient Reported):  (n/a)  Weight (Patient Reported):  (n/a)  Height (Patient Reported):  (n/a)  SpO2 (Patient Reported):  (n/a)  Temperature (Patient Reported):  (n/a)  Pulse (Patient Reported):  (n/a)      Vitals:  No vitals were obtained today due to virtual visit.    Physical Exam             Video-Visit Details    Type of service:  Video Visit   Video End Time:n/a  Originating Location (pt. Location): Other EMERGENCY ROOM     Distant Location (provider location):  On-site  Platform used for Video Visit: Kameron  Signed Electronically by: Joel Daniel Wegener, MD

## 2025-01-28 NOTE — DISCHARGE SUMMARY
[facility]  Hospitalist Discharge Summary      Date of Admission:  1/28/2025  Date of Discharge:  1/28/2025  Discharging Provider: Vincenzo Lovelace MD  Discharge Service: Hospitalist Service, GOLD TEAM 13    Discharge Diagnoses   Viral gastroenteritis    Clinically Significant Risk Factors          Follow-ups Needed After Discharge   Follow-up Appointments       Follow Up and recommended labs and tests      Follow up with Nursing home physician.  Home lantus of 15 units daily was reduced to lantus 7 units daily while in ED. Please follow glucose and increase lantus back to 15 units daily as needed.                Unresulted Labs Ordered in the Past 30 Days of this Admission       Date and Time Order Name Status Description    1/28/2025  4:19 PM C. difficile Antigen and Toxins A/B by Enzyme Immunoassay In process     1/28/2025  5:55 AM Enteric Bacteria and Virus Panel PCR In process         These results will be followed up by hospitalist pool     Discharge Disposition   Discharged to TCU  Condition at discharge: Stable    Hospital Course   Erwin Aguilar is a 64 year old male admitted on 1/28/2025. He has a complex medical history including a recently admission to the ICU from 12/31-1/25 for STEMI with VT arrest requiring CPR and requiring ECMO s/p CABG, who presented 1/27 from TCU with several hours of vomiting, diarrhea, and abdominal pain.    Improved overnight in ED  stable  By end of HOD#2, appetite increased and eager for discharge  TCU still has bed from yesterday  Unremarkable eval  Stool studies pending, but do not preclude discharge  Discharged to TCU today  Remains on lantus 7u daily for now, can uptitrate back to lantus 15u PTA as able at TCU    #gastroenteritis  #Mild alkaline phosphatase with negative US   Stool samples sent this am 1/28  LR 1L x10hr on arrival. Now oral fluids  BP meds held on arrival. Restart PTA on discharge   CT was considered and  declined on arrival. Exam non-focal.     #CAD s/p CABG  # HTN  - continue with home asa, rosuvastatin, metoprolol  Held prior to admission losartan and triamterene/hydrochlorothiazide on arrival. Restart PTA on discharge.     #Anxiety  #Chronic pain syndrome  #Major depressive disorder  -Continue gabapentin 300mg daily at 1600 and 900mg at HS  -Continue Tylenol 650mg every 8 hours PRN  -Continue oxycodone 10mg TID PRN  -Continue robaxin 500mg TID PRN  -Continue PTA baclofen 10mg TID PRN as directed  -Continue PTA sertraline 200mg daily as directed  -Continue ramelteon 8mg for sleep at HS as directed    #DM II  - decreased home 15U glargine at bedtime to 7U given low PO, LDISS  -discharged back to TCU on reduced dose lantus 7u daily which was adequate today. Can uptitrate to lantus 15u daily as needed at TCU.   -trend fingersticks and uptitrate to home glargine 15 when needed.     #Rodriguez catheter in place, chronic, 2/2 urinary retention  - urology follow-up for potential TOV 2/17/25  *continue with home flomax        Consultations This Hospital Stay   PHYSICAL THERAPY ADULT IP CONSULT    Code Status   Full Code    Time Spent on this Encounter   I, Vincenzo Lovelace MD, personally saw the patient today and spent greater than 30 minutes discharging this patient.       Vincenzo Lovelace MD  [facility] EMERGENCY DEPARTMENT  [address] [facility] [address]  Phone: [phone]  ______________________________________________________________________    Physical Exam   Vital Signs: Temp: 98.3  F (36.8  C) Temp src: Oral BP: 132/69 Pulse: 73   Resp: 16 SpO2: 94 % O2 Device: None (Room air)    Weight: 172 lbs 0 oz  General Appearance: A&Ox3 in NAD  Respiratory: ctab  Cardiovascular: rrr  GI: sft nontender  Skin: wwp  Other: Nl affect. Right bka        Primary Care Physician   Joel Daniel Wegener    Discharge Orders      General info for SNF    Length of Stay Estimate: Short Term Care: Estimated # of Days  <30  Condition at Discharge: Stable  Level of care:skilled   Rehabilitation Potential: Good  Admission H&P remains valid and up-to-date: Yes  Recent Chemotherapy: N/A  Use Nursing Home Standing Orders: Yes     Follow Up and recommended labs and tests    Follow up with Nursing home physician.  Home lantus of 15 units daily was reduced to lantus 7 units daily while in ED. Please follow glucose and increase lantus back to 15 units daily as needed.     Reason for your hospital stay    Viral gastroenteritis     Activity - Up with nursing assistance     Fall precautions     Diet    Follow this diet upon discharge: Current Diet:Orders Placed This Encounter      Regular Diet Adult       Significant Results and Procedures        Discharge Medications   Current Discharge Medication List        CONTINUE these medications which have CHANGED    Details   insulin glargine (LANTUS PEN) 100 UNIT/ML pen Inject 7 Units subcutaneously at bedtime.    Comments: If Lantus is not covered by insurance, may substitute Basaglar or Semglee or other insulin glargine product per insurance preference at same dose and frequency.    Associated Diagnoses: S/P CABG x 4           CONTINUE these medications which have NOT CHANGED    Details   acetaminophen (TYLENOL) 325 MG tablet Take 2 tablets (650 mg) by mouth every 8 hours as needed for mild pain.    Associated Diagnoses: S/P CABG x 4      albuterol (PROAIR HFA/PROVENTIL HFA/VENTOLIN HFA) 108 (90 Base) MCG/ACT inhaler INHALE 2 PUFFS BY MOUTH EVERY 6 HOURS AS NEEDED FOR SHORTNESS OF BREATH OR WHEEZING  Qty: 18 g, Refills: 1    Comments: Pharmacy may dispense brand covered by insurance (Proair, or proventil or ventolin or generic albuterol inhaler)  Associated Diagnoses: Moderate persistent asthma without complication      aspirin (ASA) 81 MG chewable tablet Take 1 tablet (81 mg) by mouth or Feeding Tube daily.    Associated Diagnoses: S/P CABG x 4      baclofen (LIORESAL) 10 MG tablet Take 10 mg by  mouth 3 times daily as needed for muscle spasms.      blood glucose monitoring (FREESTYLE FREEDOM LITE) meter device kit Use to test blood sugar 3 times daily or as directed.  Qty: 1 kit, Refills: 0    Associated Diagnoses: Type 2 diabetes mellitus without complication, with long-term current use of insulin (H)      Continuous Blood Gluc  (FREESTYLE CACHORRO 2 READER) GASTON USE TO READ BLOOD SUGARS AS PER 'S INSTRUCTIONS  Qty: 1 each, Refills: 0    Comments: Faxing supporting documentation today.  Associated Diagnoses: Type 2 diabetes mellitus with diabetic polyneuropathy, with long-term current use of insulin (H)      !! gabapentin (NEURONTIN) 300 MG capsule Take 1 capsule (300 mg) by mouth daily at 4pm.    Associated Diagnoses: S/P CABG x 4      !! gabapentin (NEURONTIN) 300 MG capsule Take 3 capsules (900 mg) by mouth at bedtime.    Associated Diagnoses: S/P CABG x 4      glucose 40 % (400 mg/mL) gel Take 15-30 g by mouth every 15 minutes as needed for low blood sugar.    Associated Diagnoses: S/P CABG x 4      insulin pen needle (GLOBAL EASE INJECT PEN NEEDLES) 32G X 4 MM miscellaneous USE AS DIRECTED EVERY DAY  Qty: 100 each, Refills: 1    Associated Diagnoses: Type 2 diabetes mellitus without complication, with long-term current use of insulin (H)      losartan (COZAAR) 25 MG tablet Take 1 tablet (25 mg) by mouth daily.    Associated Diagnoses: S/P CABG x 4      medical cannabis (Patient's own supply) See Admin Instructions (The purpose of this order is to document that the patient reports taking medical cannabis.  This is not a prescription, and is not used to certify that the patient has a qualifying medical condition.)   Flower - PRN      methocarbamol (ROBAXIN) 500 MG tablet Take 1 tablet (500 mg) by mouth 3 times daily as needed for muscle spasms.    Associated Diagnoses: S/P CABG x 4      metoprolol tartrate (LOPRESSOR) 50 MG tablet Take 1 tablet (50 mg) by mouth 2 times daily.     Associated Diagnoses: S/P CABG x 4      multivitamin, therapeutic (THERA-VIT) TABS tablet Take 1 tablet by mouth or Feeding Tube daily.    Associated Diagnoses: S/P CABG x 4      ondansetron (ZOFRAN) 4 MG tablet Take 1 tablet (4 mg) by mouth 2 times daily as needed for nausea.  Qty: 180 tablet, Refills: 3    Associated Diagnoses: Nausea without vomiting; Gastroparesis      oxyCODONE IR (ROXICODONE) 10 MG tablet Take 1 tablet (10 mg) by mouth 3 times daily as needed for severe pain.  Qty: 20 tablet, Refills: 0    Associated Diagnoses: Chronic pain syndrome; H/O foot surgery; Intractable right heel pain; Rupture of right Achilles tendon, initial encounter; Acquired calcaneus deformity of right ankle; Chronic pain of right ankle; Calcific Achilles tendinitis      ramelteon (ROZEREM) 8 MG tablet Take 1 tablet (8 mg) by mouth at bedtime.    Associated Diagnoses: S/P CABG x 4      rosuvastatin (CRESTOR) 40 MG tablet Take 1 tablet (40 mg) by mouth daily  Qty: 90 tablet, Refills: 3    Associated Diagnoses: Hyperlipidemia LDL goal <100      senna-docusate (SENOKOT-S/PERICOLACE) 8.6-50 MG tablet Take 1 tablet by mouth or Feeding Tube 2 times daily as needed for constipation.    Associated Diagnoses: S/P CABG x 4      sertraline (ZOLOFT) 100 MG tablet TAKE 2 TABLETS BY MOUTH DAILY  Qty: 180 tablet, Refills: 2    Associated Diagnoses: Generalized anxiety disorder      tamsulosin (FLOMAX) 0.4 MG capsule Take 1 capsule (0.4 mg) by mouth daily.    Associated Diagnoses: S/P CABG x 4      thiamine (B-1) 100 MG tablet Take 1 tablet (100 mg) by mouth daily.    Associated Diagnoses: S/P CABG x 4      triamterene-HCTZ (DYAZIDE) 37.5-25 MG capsule Take 1 capsule by mouth every morning  Qty: 90 capsule, Refills: 1    Associated Diagnoses: Hypertension goal BP (blood pressure) < 130/80       !! - Potential duplicate medications found. Please discuss with provider.        Allergies   Allergies   Allergen Reactions    Levaquin Anaphylaxis  and Rash     Swelling in lip and tongue felt thick    Lisinopril Anaphylaxis     Swollen tongue; inability to swallow; drooling; hives; swollen face, neck, angioedema    Acetaminophen      Hx of cirrhosis     Amlodipine      Swelling, hives, possible angioedema      Morphine      b/p dropped and arms went numb  Hypotension    Quinolones Hives    Spironolactone Unknown     Pt believes himself to be allergic to it; cannot find his old records of this.-mjc     Bactrim [Sulfamethoxazole-Trimethoprim] Rash

## 2025-01-28 NOTE — ED PROVIDER NOTES
ED Provider Note  [facility]      History     Chief Complaint   Patient presents with    Nausea & Vomiting     HPI  Erwin Aguilar is a 64 year old male with complex medical history including a recently admission to the ICU from 12/31-1/25 for STEMI with VT arrest requiring CPR and requiring ECMO s/p CABG  (see note from 12/31/24 for more details) who presents today with several hours of vomiting, diarrhea, and abdominal pain. He has been at a rehab facility since his discharge on the 25th. He reports he suddenly started having vomiting and diarrhea this afternoon as well as diffuse abdominal tenderness. He is also having bloody urine which he reports he was having during his hospital admission as well but it did resolve for a short period of time. He denies chest pain and he is unsure of fevers. He reports he doesn't have shortness of breath however he is audibly snoring. He did have aspiration pneumonia positive for Klebsiella pneumoniae during his ICU admission, he was treated for this.     Past Medical History  Past Medical History:   Diagnosis Date    Actinic keratosis     aldara    Anxiety 12/1997    Cancer (H)     squamous cell skin CA    Cauda equina spinal cord injury (H)     Chronic sinusitis 05/01/2016    Cirrhosis of liver (H) 04/01/2014    Not biopsy-proven as of 4/1/14.      Depressive disorder     Diastasis recti     Esophageal reflux     Esophageal varices in cirrhosis (H) 04/01/2014    Hospitalized for UGI blee 3/28/14, endoscopy revealed bleeding varices.    Essential hypertension, benign     Intermittent asthma     Mild depression     Mixed hyperlipidemia     Nasal polyps 05/01/2016    Nausea vomiting and diarrhea 1/28/2025    Osteoarthritis of lumbar spine, unspecified spinal osteoarthritis complication status     Other chronic pain     Paroxysmal atrial fibrillation (H) 09/22/2021    SCCA (squamous cell carcinoma) of skin     Seasonal allergic conjunctivitis     Type  II or unspecified type diabetes mellitus without mention of complication, not stated as uncontrolled     Unspecified site of spinal cord injury without evidence of spinal bone injury     due to back surgery     Past Surgical History:   Procedure Laterality Date    ABDOMEN SURGERY  2014    AMPUTATE LEG BELOW KNEE Right 05/09/2024    Procedure: Right below knee amputation (transtibial amputation);  Surgeon: Kurtis Nye MD;  Location: [facility]    ANGIOGRAM Right 04/23/2024    Procedure: Ultrasound guided percutaneous transarterial left common femoral artery access, diagnostic aortoiliac angiogram, selective cannulation of right comon iliac, righ external iliac, right common femoral, and right superficial femoral artery, balloon angioplasty of right superficial femoral artery, 6mm x 12 cm self-expanding drug-coated stent placement of right superficial femoral artery, lef    BACK SURGERY  08/2009    BYPASS GRAFT ARTERY CORONARY N/A 01/02/2025    Procedure: Median Sternotomy, on Cardiopulmonary Bypass Pump, Left Internal Mammary Artery [place], Endoscopic [place] of Left Greater Saphenous Vein, Coronary Artery Bypass Graft x 4, Left Atrial Appendage Ligation, Left Atrial Ablation, and Intraoperative Transesophageal Echocardiogram By Anesthesia;  Surgeon: Bernice Rosenbaum MD;  [facility]    COLONOSCOPY N/A 05/12/2016    Procedure: COMBINED COLONOSCOPY, SINGLE OR MULTIPLE BIOPSY/POLYPECTOMY BY BIOPSY;  Surgeon: Ana Paula Urbina MD;  [facility] GI    DECOMPRESSION CUBITAL TUNNEL Left 08/31/2023    Procedure: LEFT CUBITAL TUNNEL RELEASE,;  Surgeon: Deny Dixon MD;  Location: [facility] OR    ENDOSCOPY UPPER, COLONOSCOPY, COMBINED  10/19/2011    Procedure:COMBINED ENDOSCOPY UPPER, COLONOSCOPY; Upper Endoscopy, Colonoscopy with Polypectomy x4; Surgeon:AMBAR RODRÍGUEZ; Location[facility] OR    ENT SURGERY  01/2016    Ongoing since then    ESOPHAGOSCOPY, GASTROSCOPY, DUODENOSCOPY (EGD), COMBINED  03/28/2014     Procedure: COMBINED ESOPHAGOSCOPY, GASTROSCOPY, DUODENOSCOPY (EGD);  EGD, Gastric Biopsies, Esophageal Banding;  Surgeon: Reyna Tovar MD;  [facility]    ESOPHAGOSCOPY, GASTROSCOPY, DUODENOSCOPY (EGD), COMBINED  06/09/2014    Procedure: COMBINED ESOPHAGOSCOPY, GASTROSCOPY, DUODENOSCOPY (EGD);  Surgeon: Curtis Mendez MD;  [facility]    ESOPHAGOSCOPY, GASTROSCOPY, DUODENOSCOPY (EGD), COMBINED  07/24/2014    Procedure: COMBINED ESOPHAGOSCOPY, GASTROSCOPY, DUODENOSCOPY (EGD);  Surgeon: Gerard Samaniego MD;  [facility]    ESOPHAGOSCOPY, GASTROSCOPY, DUODENOSCOPY (EGD), COMBINED N/A 10/31/2014    Procedure: COMBINED ESOPHAGOSCOPY, GASTROSCOPY, DUODENOSCOPY (EGD);  Surgeon: Gerard Samaniego MD;  [facility]    ESOPHAGOSCOPY, GASTROSCOPY, DUODENOSCOPY (EGD), COMBINED N/A 05/12/2016    Procedure: COMBINED ESOPHAGOSCOPY, GASTROSCOPY, DUODENOSCOPY (EGD);  Surgeon: Ana Paula Urbina MD;  [facility]    ESOPHAGOSCOPY, GASTROSCOPY, DUODENOSCOPY (EGD), COMBINED N/A 08/02/2018    Procedure: COMBINED ESOPHAGOSCOPY, GASTROSCOPY, DUODENOSCOPY (EGD);  EGD;  Surgeon: Yu Wagner MD;  [facility]    HCL SQUAMOUS CELL CARCINOMA AG  10/2007    left forearm    HERNIORRHAPHY UMBILICAL  11/08/2012    Procedure: HERNIORRHAPHY UMBILICAL;  Open Umbilical Hernia Repair With Mesh ;  Surgeon: Chase Nicholson MD;  [facility]    INSERT STIMULATOR DORSAL COLUMN N/A 06/28/2018    Procedure: INSERT STIMULATOR DORSAL COLUMN;;  Surgeon: Elvis Sauceda MD;  [facility]    IR FLUORO 0-1 HOUR  1/23/2025    IR LOWER EXTREMITY ANGIOGRAM RIGHT  04/23/2024    neuro stimulator  2010    PICC DOUBLE LUMEN PLACEMENT Left 01/16/2025    Lateral brachial, 47 cm, 3 cm external length    RELEASE CARPAL TUNNEL Left 08/31/2023    Procedure: LEFT OPEN CARPAL TUNNEL RELEASE,;  Surgeon: Deny Dixon MD;  Location[facility]    RELEASE TRIGGER FINGER Left 08/31/2023     Procedure: Release left trigger finger thumb;  Surgeon: Deny Dixon MD;  Location: [facility] OR    REMOVE EXTRACORPORAL MEMBRANE OXYGENATOR ADULT N/A 01/03/2025    Procedure: Remove Left Leg Peripheral Extracorporal Membrane Oxygenator and Closure;  Surgeon: Bernice Rosenbaum MD;  [facility]    REMOVE GENERATOR STIMULATOR (LOCATION) N/A 06/28/2018    Procedure: REMOVE GENERATOR STIMULATOR (LOCATION);  Spinal Cord Stimulator and IPG Explant and Re-Implant of SCS System (Leads and IPG);  Surgeon: Elvis Sauceda MD;  [facility]    REPAIR TENDON ACHILLES Right 11/11/2020    Procedure: Right achilles debridement and partial calcaneus excision;  Surgeon: Eh Sandoval MD;  Location: [facility] OR    SURGICAL HISTORY OF -   01/2002    abcess tooth    SURGICAL HISTORY OF -   1999    L4-5 laminectomy, cauda equina syndrome    SURGICAL HISTORY OF -   +    herniated disk repair    TONSILLECTOMY  10/1964    TRANSPOSITION ULNAR NERVE (ELBOW)      right    ZZC APPENDECTOMY  1974     insulin glargine (LANTUS PEN) 100 UNIT/ML pen  acetaminophen (TYLENOL) 325 MG tablet  albuterol (PROAIR HFA/PROVENTIL HFA/VENTOLIN HFA) 108 (90 Base) MCG/ACT inhaler  aspirin (ASA) 81 MG chewable tablet  baclofen (LIORESAL) 10 MG tablet  blood glucose monitoring (FREESTYLE FREEDOM LITE) meter device kit  Continuous Blood Gluc  (FREESTYLE CACHORRO 2 READER) GASTON  gabapentin (NEURONTIN) 300 MG capsule  gabapentin (NEURONTIN) 300 MG capsule  glucose 40 % (400 mg/mL) gel  insulin pen needle (GLOBAL EASE INJECT PEN NEEDLES) 32G X 4 MM miscellaneous  losartan (COZAAR) 25 MG tablet  medical cannabis (Patient's own supply)  methocarbamol (ROBAXIN) 500 MG tablet  metoprolol tartrate (LOPRESSOR) 50 MG tablet  multivitamin, therapeutic (THERA-VIT) TABS tablet  ondansetron (ZOFRAN) 4 MG tablet  oxyCODONE IR (ROXICODONE) 10 MG tablet  pantoprazole (PROTONIX) 40 MG EC tablet  ramelteon (ROZEREM) 8 MG tablet  rosuvastatin  (CRESTOR) 40 MG tablet  senna-docusate (SENOKOT-S/PERICOLACE) 8.6-50 MG tablet  sertraline (ZOLOFT) 100 MG tablet  tamsulosin (FLOMAX) 0.4 MG capsule  thiamine (B-1) 100 MG tablet  triamterene-HCTZ (DYAZIDE) 37.5-25 MG capsule  vancomycin (VANCOCIN) 125 MG capsule      Allergies   Allergen Reactions    Levaquin Anaphylaxis and Rash     Swelling in lip and tongue felt thick    Lisinopril Anaphylaxis     Swollen tongue; inability to swallow; drooling; hives; swollen face, neck, angioedema    Acetaminophen      Hx of cirrhosis     Amlodipine      Swelling, hives, possible angioedema      Morphine      b/p dropped and arms went numb  Hypotension    Quinolones Hives    Spironolactone Unknown     Pt believes himself to be allergic to it; cannot find his old records of this.-mjc     Bactrim [Sulfamethoxazole-Trimethoprim] Rash     Family History  Family History   Problem Relation Age of Onset    Cancer Mother         lung    Breast Cancer Mother     Other Cancer Mother     Cancer Father         esophogeal, GERD    Snoring Father     Diabetes Brother         amputation, Type 1    Diabetes Brother     Diabetes Brother     Cancer - colorectal No family hx of     Glaucoma No family hx of     Macular Degeneration No family hx of     Anesthesia Reaction No family hx of     Venous thrombosis No family hx of      Social History   Social History     Tobacco Use    Smoking status: Former     Current packs/day: 0.00     Types: Cigarettes     Start date: 10/13/1983     Quit date: 1984     Years since quittin.6     Passive exposure: Past    Smokeless tobacco: Never   Vaping Use    Vaping status: Every Day    Substances: THC, CBD    Devices: [email or web address]   Substance Use Topics    Alcohol use: Not Currently     Comment: - last drink was 3/28/14    Drug use: Yes     Frequency: 2.0 times per week     Types: Marijuana     Comment: medical marijuana - vape daily      A medically appropriate review of systems was performed with  "pertinent positives and negatives noted in the HPI, and all other systems negative.    Physical Exam   BP: (!) 162/71  Pulse: 95  Temp: 97.9  F (36.6  C)  Resp: 20  Height: 180.3 cm (5' 11\")  Weight: 78 kg (172 lb)  SpO2: 94 %  Physical Exam  Constitutional:       Appearance: He is ill-appearing.   HENT:      Head: Normocephalic and atraumatic.      Nose: Nose normal.      Mouth/Throat:      Mouth: Mucous membranes are dry.   Eyes:      Extraocular Movements: Extraocular movements intact.      Conjunctiva/sclera: Conjunctivae normal.   Cardiovascular:      Rate and Rhythm: Normal rate and regular rhythm.   Pulmonary:      Effort: Pulmonary effort is normal.      Breath sounds: Transmitted upper airway sounds present. Rhonchi present.   Abdominal:      General: Abdomen is flat.      Palpations: Abdomen is soft.      Tenderness: There is abdominal tenderness in the right upper quadrant, right lower quadrant and epigastric area.      Comments: Significant tenderness   Musculoskeletal:      Cervical back: Normal range of motion.      Comments: Right below the knee amputation   Skin:     General: Skin is warm.   Neurological:      General: No focal deficit present.   Psychiatric:         Mood and Affect: Mood normal.         ED Course, Procedures, & Data      Procedures  Reviewed patient chart and nursing notes   Lab work up included CBC, CMP, flu/rsv/covid, INR, Lipase, Mg, UA  Symptom mangement included famotidine, dilaudid, Mg replacement, Zofran, NaCl  Imaging workup included abdominal ultrasound. Considered CT imaging due to pain however patient declined and requesting US instead.      Interpreted by Natalia Del Cid MD   Time reviewed: 0331  Symptoms at time of EKG: chest pain   Rhythm: normal sinus   Rate: normal  Axis: normal  Ectopy: none  Conduction: normal, QTc 460  ST Segments/ T Waves: T wave inversion lateral leads, no acute ischemic change  Q Waves: none  Comparison to prior: No significant change when " compared to prior    Clinical Impression: normal sinus rhythm with no acute ischemic change     Results for orders placed or performed during the hospital encounter of 01/28/25   US Abdomen Limited (RUQ)     Status: None    Narrative    EXAM: US ABDOMEN LIMITED  LOCATION: [facility]  DATE: 1/28/2025    INDICATION: RUQ pain, vomiting.  COMPARISON: CT chest, abdomen and pelvis with IV contrast 1/17/2025.  TECHNIQUE: Limited abdominal ultrasound.    FINDINGS:    GALLBLADDER: Not well distended. Mild wall thickening measuring 4.3 mm. Small stone in the dependent aspect of the gallbladder seen on CT is not confidently demonstrated on ultrasound. No pericholecystic edema or sonographic tenderness during   examination.    BILE DUCTS: No biliary ectasia. The common duct measures 3.5 mm.    LIVER: Echogenic hepatic parenchyma without discrete lesion. Smooth hepatic contour. Patent portal vein with normal directional flow.    RIGHT KIDNEY: No hydronephrosis. Right kidney measures 12.3 cm [id number].    PANCREAS: The visualized portions are normal.    No ascites.      Impression    IMPRESSION:  1.  The gallbladder is not well distended. Tiny stone in the dependent gallbladder is not confidently demonstrated on ultrasound. No pericholecystic edema, biliary ectasia or sonographic tenderness during examination.    2.  Echogenic hepatic parenchyma without discrete lesion.    3.  Pancreas partially visualized due to overlying bowel gas, grossly normal where seen.    US LI-RADS Category for high risk liver surveillance: US-1 Negative or definitely benign observation.    American College of Radiology Committee on LI-RADS v2017.                   XR Chest Port 1 View     Status: None    Narrative    Portable chest    INDICATION: Chest and right upper quadrant pain. Cough.    COMPARISON: CT 1/17/2025    FINDINGS: Elevated right hemidiaphragm. Spinal stimulator device.  Median sternotomy.  Left atrial appendage ligation clip.  Atherosclerotic calcification at the aortic knob. Heart size overall  normal for portable technique. Linear density in the right lower lung  zone consistent with subsegmental atelectasis. No pneumothorax.      Impression    IMPRESSION: Elevated right hemidiaphragm. No acute-appearing  suspicious abnormal opacities. Subsegmental atelectasis.    AYLEEN WATSON MD         SYSTEM ID:  [id number]   Comprehensive metabolic panel     Status: Abnormal   Result Value Ref Range    Sodium 137 135 - 145 mmol/L    Potassium 3.5 3.4 - 5.3 mmol/L    Carbon Dioxide (CO2) 23 22 - 29 mmol/L    Anion Gap 14 7 - 15 mmol/L    Urea Nitrogen 11.8 8.0 - 23.0 mg/dL    Creatinine 0.58 (L) 0.67 - 1.17 mg/dL    GFR Estimate >90 >60 mL/min/1.73m2    Calcium 8.8 8.8 - 10.4 mg/dL    Chloride 100 98 - 107 mmol/L    Glucose 174 (H) 70 - 99 mg/dL    Alkaline Phosphatase 268 (H) 40 - 150 U/L    AST 31 0 - 45 U/L    ALT 33 0 - 70 U/L    Protein Total 6.9 6.4 - 8.3 g/dL    Albumin 3.5 3.5 - 5.2 g/dL    Bilirubin Total 0.6 <=1.2 mg/dL   Magnesium     Status: Abnormal   Result Value Ref Range    Magnesium 1.5 (L) 1.7 - 2.3 mg/dL   Lipase     Status: Abnormal   Result Value Ref Range    Lipase 86 (H) 13 - 60 U/L   UA with Microscopic reflex to Culture     Status: Abnormal    Specimen: Urine, Rodriguez Catheter   Result Value Ref Range    Color Urine Orange (A) Colorless, Straw, Light Yellow, Yellow    Appearance Urine Slightly Cloudy (A) Clear    Glucose Urine Negative Negative mg/dL    Bilirubin Urine Negative Negative    Ketones Urine Negative Negative mg/dL    Specific Gravity Urine 1.021 1.003 - 1.035    Blood Urine Large (A) Negative    pH Urine 6.5 5.0 - 7.0    Protein Albumin Urine 70 (A) Negative mg/dL    Urobilinogen Urine Normal Normal, 2.0 mg/dL    Nitrite Urine Negative Negative    Leukocyte Esterase Urine Trace (A) Negative    Mucus Urine Present (A) None Seen /LPF    RBC Urine >182 (H) <=2 /HPF    WBC  Urine 4 <=5 /HPF    Narrative    Urine Culture not indicated   [place] Bottom Draw     Status: None    Narrative    The following orders were created for panel order [place] Bottom Draw.  Procedure                               Abnormality         Status                     ---------                               -----------         ------                     Extra Blue Top Tube[[id number]]                              Final result               Extra Red Top Tube[[id number]]                               Final result               Extra Green Top (Lithium...[[id number]]                      Final result               Extra Purple Top Tube[[id number]]                            Final result                 Please view results for these tests on the individual orders.   CBC with platelets and differential     Status: Abnormal   Result Value Ref Range    WBC Count 9.6 4.0 - 11.0 10e3/uL    RBC Count 3.96 (L) 4.40 - 5.90 10e6/uL    Hemoglobin 11.8 (L) 13.3 - 17.7 g/dL    Hematocrit 37.1 (L) 40.0 - 53.0 %    MCV 94 78 - 100 fL    MCH 29.8 26.5 - 33.0 pg    MCHC 31.8 31.5 - 36.5 g/dL    RDW 15.3 (H) 10.0 - 15.0 %    Platelet Count 219 150 - 450 10e3/uL    % Neutrophils 93 %    % Lymphocytes 2 %    % Monocytes 4 %    % Eosinophils 1 %    % Basophils 0 %    % Immature Granulocytes 0 %    NRBCs per 100 WBC 0 <1 /100    Absolute Neutrophils 9.0 (H) 1.6 - 8.3 10e3/uL    Absolute Lymphocytes 0.2 (L) 0.8 - 5.3 10e3/uL    Absolute Monocytes 0.3 0.0 - 1.3 10e3/uL    Absolute Eosinophils 0.1 0.0 - 0.7 10e3/uL    Absolute Basophils 0.0 0.0 - 0.2 10e3/uL    Absolute Immature Granulocytes 0.0 <=0.4 10e3/uL    Absolute NRBCs 0.0 10e3/uL   Extra Blue Top Tube     Status: None   Result Value Ref Range    Hold Specimen [facility]    Extra Red Top Tube     Status: None   Result Value Ref Range    Hold Specimen JIC    Extra Green Top (Lithium Heparin) Tube     Status: None   Result Value Ref Range    Hold Specimen J[facility]    Extra Purple Top Tube     Status: None    Result Value Ref Range    Hold Specimen [facility]    INR     Status: Abnormal   Result Value Ref Range    INR 1.16 (H) 0.85 - 1.15   Influenza A/B, RSV and SARS-CoV2 PCR (COVID-19) Nose     Status: Normal    Specimen: Nose; Swab   Result Value Ref Range    Influenza A PCR Negative Negative    Influenza B PCR Negative Negative    RSV PCR Negative Negative    SARS CoV2 PCR Negative Negative    Narrative    Testing was performed using the Xpert Xpress CoV2/Flu/RSV Assay on the [employer] Gene[employer]pert Instrument. This test should be ordered for the detection of SARS-CoV2, influenza, and RSV viruses in individuals with signs and symptoms of respiratory tract infection. This test is for in vitro diagnostic use under the [place] FDA for laboratories certified under CLIA to perform high or moderate complexity testing. This test has been [place] FDA cleared. A negative result does not rule out the presence of PCR inhibitors in the specimen or target RNA in concentration below the limit of detection for the assay. If only one viral target is positive but coinfection with multiple targets is suspected, the sample should be re-tested with another FDA cleared, approved, or authorized test, if coninfection would change clinical management. This test was validated by the [facility]. These laboratories are certified under the Clinical Laboratory Improvement Amendments of 1988 (CLIA-88) as qualified to perfom high complexity laboratory testing.   Phosphorus     Status: Abnormal   Result Value Ref Range    Phosphorus 2.4 (L) 2.5 - 4.5 mg/dL   Glucose by meter     Status: Abnormal   Result Value Ref Range    GLUCOSE BY METER POCT 137 (H) 70 - 99 mg/dL   Glucose by meter     Status: Abnormal   Result Value Ref Range    GLUCOSE BY METER POCT 117 (H) 70 - 99 mg/dL   EKG 12-lead, tracing only     Status: None   Result Value Ref Range    Systolic Blood Pressure  mmHg    Diastolic Blood Pressure  mmHg    Ventricular Rate 94 BPM    Atrial  Rate 94 BPM    ID Interval 150 ms    QRS Duration 88 ms     ms    QTc 460 ms    P Axis 41 degrees    R AXIS -24 degrees    T Axis 230 degrees    Interpretation ECG       Sinus rhythm  T wave abnormality, consider inferolateral ischemia  Abnormal ECG    Unconfirmed report - interpretation of this ECG is computer generated - see medical record for final interpretation  Confirmed by - EMERGENCY ROOM, PHYSICIAN ([id number]),  Faisal Matamoros ([id number]) on 1/28/2025 7:03:10 AM     Enteric Bacteria and Virus Panel PCR     Status: Abnormal    Specimen: Per Rectum; Stool   Result Value Ref Range    Campylobacter species Negative Negative    Salmonella species Negative Negative    Vibrio species Negative Negative    Vibrio cholerae Negative Negative    Yersinia enterocolitica Negative Negative    Enteropathogenic E. coli (EPEC) Negative Negative, NA    Shiga-like toxin-producing E. coli (STEC) Negative Negative    Shigella/Enteroinvasive E. coli (EIEC) Negative Negative    Cryptosporidium species Negative Negative    Giardia lamblia Negative Negative    Norovirus Gl/Gll Positive (A) Negative    Rotavirus A Negative Negative    Plesiomonas shigelloides Negative Negative    Enteroaggregative E. coli (EAEC) Negative Negative    Enterotoxigenic E. coli (ETEC) Negative Negative    E. coli O157 NA Negative, NA    Cyclospora cayetanensis Negative Negative    Entamoeba histolytica Negative Negative    Adenovirus F40/41 Negative Negative    Astrovirus Negative Negative    Sapovirus Negative Negative    Narrative    Assay performed using the FDA-cleared FilmArray GI Panel from [employer]  A negative result should not rule out infection in patients with a probability for gastrointestinal infection. The assay does not test for all potential infectious agents of diarrheal disease.  Positive results do not distinguish between a viable or replicating organism and the presence of a nonviable organism or nucleic acid, nor do  they exclude the possibility of coinfection by organisms not in the panel.  Results are intended to aid in the diagnosis of illness and are meant to be used in conjunction with other clinical findings.  This test has been verified and is performed by the Infectious Diseases Diagnostic Laboratory at [facility]. This laboratory is certified under the Clinical Laboratory Improvement Amendments of 1988 (CLIA-88) as qualified to perform high complexity clinical laboratory testing.   C. difficile Toxin B PCR with reflex to C. difficile Antigen and Toxins A/B EIA     Status: Abnormal    Specimen: Per Rectum; Stool   Result Value Ref Range    C Difficile Toxin B by PCR Positive (A) Negative    Narrative    The [employer] Xpert C. difficile Assay, performed on the [employer]  Instrument Systems, is a qualitative in vitro diagnostic test for rapid detection of toxin B gene sequences from unformed (liquid or soft) stool specimens collected from patients suspected of having Clostridioides difficile infection (CDI). The test utilizes automated real-time polymerase chain reaction (PCR) to detect toxin gene sequences associated with toxin producing C. difficile. The Xpert C. difficile Assay is intended as an aid in the diagnosis of CDI.   C. difficile Antigen and Toxins A/B by Enzyme Immunoassay     Status: Abnormal    Specimen: Per Rectum; Stool   Result Value Ref Range    C. difficile GDH Antigen Positive (A) Negative    C. difficile Toxin Positive (A) Negative    Narrative    C. difficile GDH antigen and C. difficile toxin were detected by enzyme immunoassay. Results must be interpreted based on clinical findings and are supportive of C. difficile infection.   CBC with platelets differential     Status: Abnormal    Narrative    The following orders were created for panel order CBC with platelets differential.  Procedure                               Abnormality         Status                     ---------                                -----------         ------                     CBC with platelets and d...[[id number]]  Abnormal            Final result                 Please view results for these tests on the individual orders.     Medications   sodium chloride 0.9% BOLUS 1,000 mL (0 mLs Intravenous Stopped 1/28/25 0526)   ondansetron (ZOFRAN) injection 4 mg (4 mg Intravenous $Given 1/28/25 0356)   famotidine (PEPCID) injection 20 mg (20 mg Intravenous $Given 1/28/25 0357)   HYDROmorphone (PF) (DILAUDID) injection 0.5 mg (0.5 mg Intravenous $Given 1/28/25 0354)   magnesium sulfate 2 g in 50 mL sterile water intermittent infusion (0 g Intravenous Stopped 1/28/25 0525)   prochlorperazine (COMPAZINE) injection 5 mg (5 mg Intravenous $Given 1/28/25 0845)   lactated ringers BOLUS 1,000 mL (0 mLs Intravenous Stopped 1/28/25 1624)     Labs Ordered and Resulted from Time of ED Arrival to Time of ED Departure   COMPREHENSIVE METABOLIC PANEL - Abnormal       Result Value    Sodium 137      Potassium 3.5      Carbon Dioxide (CO2) 23      Anion Gap 14      Urea Nitrogen 11.8      Creatinine 0.58 (*)     GFR Estimate >90      Calcium 8.8      Chloride 100      Glucose 174 (*)     Alkaline Phosphatase 268 (*)     AST 31      ALT 33      Protein Total 6.9      Albumin 3.5      Bilirubin Total 0.6     MAGNESIUM - Abnormal    Magnesium 1.5 (*)    LIPASE - Abnormal    Lipase 86 (*)    ROUTINE UA WITH MICROSCOPIC REFLEX TO CULTURE - Abnormal    Color Urine Orange (*)     Appearance Urine Slightly Cloudy (*)     Glucose Urine Negative      Bilirubin Urine Negative      Ketones Urine Negative      Specific Gravity Urine 1.021      Blood Urine Large (*)     pH Urine 6.5      Protein Albumin Urine 70 (*)     Urobilinogen Urine Normal      Nitrite Urine Negative      Leukocyte Esterase Urine Trace (*)     Mucus Urine Present (*)     RBC Urine >182 (*)     WBC Urine 4     CBC WITH PLATELETS AND DIFFERENTIAL - Abnormal    WBC Count 9.6      RBC Count  3.96 (*)     Hemoglobin 11.8 (*)     Hematocrit 37.1 (*)     MCV 94      MCH 29.8      MCHC 31.8      RDW 15.3 (*)     Platelet Count 219      % Neutrophils 93      % Lymphocytes 2      % Monocytes 4      % Eosinophils 1      % Basophils 0      % Immature Granulocytes 0      NRBCs per 100 WBC 0      Absolute Neutrophils 9.0 (*)     Absolute Lymphocytes 0.2 (*)     Absolute Monocytes 0.3      Absolute Eosinophils 0.1      Absolute Basophils 0.0      Absolute Immature Granulocytes 0.0      Absolute NRBCs 0.0     INR - Abnormal    INR 1.16 (*)    PHOSPHORUS - Abnormal    Phosphorus 2.4 (*)    GLUCOSE BY METER - Abnormal    GLUCOSE BY METER POCT 137 (*)    GLUCOSE BY METER - Abnormal    GLUCOSE BY METER POCT 117 (*)    C. DIFFICILE TOXIN B PCR WITH REFLEX TO C. DIFFICILE ANTIGEN AND TOXINS A/B EIA - Abnormal    C Difficile Toxin B by PCR Positive (*)    C. DIFFICILE ANTIGEN AND TOXINS A/B BY ENZYME IMMUNOASSAY - Abnormal    C. difficile GDH Antigen Positive (*)     C. difficile Toxin Positive (*)    INFLUENZA A/B, RSV AND SARS-COV2 PCR - Normal    Influenza A PCR Negative      Influenza B PCR Negative      RSV PCR Negative      SARS CoV2 PCR Negative       XR Chest Port 1 View   Final Result   IMPRESSION: Elevated right hemidiaphragm. No acute-appearing   suspicious abnormal opacities. Subsegmental atelectasis.      AYLEEN WATSON MD            SYSTEM ID:  [id number]      US Abdomen Limited (RUQ)   Final Result   IMPRESSION:   1.  The gallbladder is not well distended. Tiny stone in the dependent gallbladder is not confidently demonstrated on ultrasound. No pericholecystic edema, biliary ectasia or sonographic tenderness during examination.      2.  Echogenic hepatic parenchyma without discrete lesion.      3.  Pancreas partially visualized due to overlying bowel gas, grossly normal where seen.      US LI-RADS Category for high risk liver surveillance: US-1 Negative or definitely benign observation.      American  College of Radiology Committee on LI-RADS v2017.                                  Critical care was not performed.     Medical Decision Making  The patient's presentation was of high complexity (an acute health issue posing potential threat to life or bodily function).    The patient's evaluation involved:  review of external note(s) from 2 sources (prior ED visit, recent hospital discharge summary)  review of 3+ test result(s) ordered prior to this encounter (recent labs, CT imaging)  strong consideration of a test (CT) that was ultimately deferred  ordering and/or review of 3+ test(s) in this encounter (see separate area of note for details)  independent interpretation of testing performed by another health professional (ultrasound)  discussion of management or test interpretation with another health professional (internal medicine team)    The patient's management necessitated moderate risk (prescription drug management including medications given in the ED), high risk (a parenteral controlled substance), and high risk (a decision regarding hospitalization).    Assessment & Plan    Erwin's diarrhea and vomiting as well as abdominal pain is concerning for enteritis, c.diff, pancreatitis, cholecystitis, and the flu. Work up revealed CBC consistent with baseline, CMP mostly consistent with baseline but with alkaline phosphatase 268. Low Mg which we replaced. Lipase mildly elevated to 86. Negative for flu/rsv/covid, UA with large amount of blood but otherwise consistent with previous UA no sign of infection.     Given patient's abdominal symptoms and elevated alk phos along with mildly elevated lipase wanted to obtain CT CAP.  Patient declined as he does not want a CT and is requesting for ultrasound.  Ultrasound was performed and demonstrated tiny stone in gallbladder without gallbladder inflammation.      On reevaluation patient with ongoing abdominal discomfort, nausea.  Patient with no further episodes of  vomiting, or diarrhea in the emergency department however states that still feel nausea and believes he may have some diarrhea.  Patient states he feels too weak to get up.  Suspect likely viral gastroenteritis however if worsening pain or persistent vomiting would be discussed and consider CT imaging.  I discussed patient management with internal medicine team and will plan on observation for continued monitoring, IV antibiotics, IV hydration.  Patient understands and agrees with plan.    I have reviewed the nursing notes. I have reviewed the findings, diagnosis, plan and need for follow up with the patient.    .--    ED Attending Physician Attestation    I Natalia Del Cid MD, cared for this patient with the Medical Student. I performed, or re-performed, the physical exam and medical decision-making. I have verified the accuracy of all the medical student findings and documentation above, and have edited as necessary.    Summary of HPI, PE, ED Course   Patient is a 64 year old male evaluated in the emergency department for abdominal pain, nausea, vomiting, diarrhea. Exam and ED course notable for comprehensive labs near baseline, mild elevation in alkaline phosphatase.  Ultrasound with no evidence of acute cholecystitis.  Patient declined CT.  Stool samples pending.. After the completion of care in the emergency department, the patient was admitted to observation.      Natalia Del Cid MD  Emergency Medicine       Final diagnoses:   Generalized abdominal pain   Nausea vomiting and diarrhea   Mildred Farrar, MS4   [employer]     Natalia Del Cid MD  [facility] EMERGENCY DEPARTMENT  1/28/2025     Natalia Del Cid MD  01/29/25 0113       Natalia Del Cid MD  01/29/25 0139

## 2025-01-28 NOTE — PROGRESS NOTES
DISCHARGE                      1/28/25  ----------------------------------------------------------------------------  Discharged to: Home  Via: [facility] transport  Accompanied by: EMS  Discharge Instructions: diet, activity, medications, follow up appointments, when to call the MD, aftercare instructions, and what to watchout for (i.e. s/s of infection, increasing SOB, palpitations, chest pain,)  Prescriptions: No new prescriptions  Follow Up Appointments: arranged; information given  Belongings: All sent with pt  IV: out  Telemetry: off  Pt exhibits understanding of above discharge instructions; all questions answered.    Discharge Paperwork: Signed, copied, and sent home with patient.

## 2025-01-28 NOTE — H&P
[facility]    History and Physical - Hospitalist Service, GOLD TEAM 13       Date of Admission:  1/28/2025    Assessment & Plan      Erwin Aguilar is a 64 year old male admitted on 1/28/2025. He has a complex medical history including a recently admission to the ICU from 12/31-1/25 for STEMI with VT arrest requiring CPR and requiring ECMO s/p CABG, who presents from TCU with several hours of vomiting, diarrhea, and abdominal pain.    #Suspect gastroenteritis  #Mild alkaline phosphatase with negative US   Stool samples pending  LR 1L x10hr  Hold BP meds  Consider CT a/p if fails to improve quickly (pt declined initially in ED)    #CAD s/p CABG  # HTN  - continue with home asa, rosuvastatin, metoprolol  Hold prior to admission losartan and triamterene/hydrochlorothiazide given ongoing losses    #Anxiety  #Chronic pain syndrome  #Major depressive disorder  -Continue gabapentin 300mg daily at 1600 and 900mg at HS  -Continue Tylenol 650mg every 8 hours PRN  -Continue oxycodone 10mg TID PRN  -Continue robaxin 500mg TID PRN  -Continue PTA baclofen 10mg TID PRN as directed  -Continue PTA sertraline 200mg daily as directed  -Continue ramelteon 8mg for sleep at HS as directed    #DM II  - decrease home 15U glargine at bedtime to 7U given low PO, LDISS    #Rodriguez catheter in place, chronic, 2/2 urinary retention  - urology follow-up for potential TOV 2/17/25  *continue with home flomax           Observation Goals: -diagnostic tests and consults completed and resulted, -vital signs normal or at patient baseline, Nurse to notify provider when observation goals have been met and patient is ready for discharge.  Diet: Clear Liquid Diet  DVT Prophylaxis: none ordered on admission given unclear if may need procedure   Rodriguez Catheter: PRESENT, indication:    Lines: None     Cardiac Monitoring: None  Code Status: Full Code    Clinically Significant Risk Factors Present on Admission              # Hypomagnesemia: Lowest Mg = 1.5 mg/dL in last 2 days, will replace as needed      # Coagulation Defect: INR = 1.16 (Ref range: 0.85 - 1.15) and/or PTT = 34 Seconds (Ref range: 22 - 38 Seconds), will monitor for bleeding  # Drug Induced Platelet Defect: home medication list includes an antiplatelet medication   # Hypertension: Noted on problem list               # Financial/Environmental Concerns:    # Asthma: noted on problem list  # History of CABG: noted on surgical history       Disposition Plan     Medically Ready for Discharge: Anticipated Tomorrow           Jayson Tim MD  Hospitalist Service, GOLD TEAM [address] [place] [address]  Securely message with [employer] (more info)  Text page via [facility] Paging/Directory   See signed in provider for up to date coverage information    ______________________________________________________________________    Chief Complaint   Diarrhea and emesis    History is obtained from the patient    History of Present Illness   rEwin Aguilar is a 64 year old male with complex medical history including a recently admission to the ICU from 12/31-1/25 for STEMI with VT arrest requiring CPR and requiring ECMO s/p CABG, who presents from TCU with several hours of vomiting, diarrhea, and abdominal pain.    Patient actively dry heaving. Has a stool in his diaper. Otherwise denies chest pain, abdominal pain at rest, light headed , dizziness, cough or shortness of breath.     Given fluids and pain control in ED. ED wanted to get CT but patient did not want that, instead got abdominal US which did not show clear explanation for mild alkaline phosphatase elevation and mild lipase elevation.      Past Medical History    Past Medical History:   Diagnosis Date    Actinic keratosis     aldara    Anxiety 12/1997    Cancer (H)     squamous cell skin CA    Cauda equina spinal cord injury (H)     Chronic sinusitis 05/01/2016    Cirrhosis of liver (H) 04/01/2014     Not biopsy-proven as of 4/1/14.      Depressive disorder     Diastasis recti     Esophageal reflux     Esophageal varices in cirrhosis (H) 04/01/2014    Hospitalized for UGI blee 3/28/14, endoscopy revealed bleeding varices.    Essential hypertension, benign     Intermittent asthma     Mild depression     Mixed hyperlipidemia     Nasal polyps 05/01/2016    Nausea vomiting and diarrhea 1/28/2025    Osteoarthritis of lumbar spine, unspecified spinal osteoarthritis complication status     Other chronic pain     Paroxysmal atrial fibrillation (H) 09/22/2021    SCCA (squamous cell carcinoma) of skin     Seasonal allergic conjunctivitis     Type II or unspecified type diabetes mellitus without mention of complication, not stated as uncontrolled     Unspecified site of spinal cord injury without evidence of spinal bone injury     due to back surgery       Past Surgical History   Past Surgical History:   Procedure Laterality Date    ABDOMEN SURGERY  2014    AMPUTATE LEG BELOW KNEE Right 05/09/2024    Procedure: Right below knee amputation (transtibial amputation);  Surgeon: Kurtis Nye MD;  Location: [facility]    ANGIOGRAM Right 04/23/2024    Procedure: Ultrasound guided percutaneous transarterial left common femoral artery access, diagnostic aortoiliac angiogram, selective cannulation of right comon iliac, righ external iliac, right common femoral, and right superficial femoral artery, balloon angioplasty of right superficial femoral artery, 6mm x 12 cm self-expanding drug-coated stent placement of right superficial femoral artery, lef    BACK SURGERY  08/2009    BYPASS GRAFT ARTERY CORONARY N/A 01/02/2025    Procedure: Median Sternotomy, on Cardiopulmonary Bypass Pump, Left Internal Mammary Artery [place], Endoscopic [place] of Left Greater Saphenous Vein, Coronary Artery Bypass Graft x 4, Left Atrial Appendage Ligation, Left Atrial Ablation, and Intraoperative Transesophageal Echocardiogram By Anesthesia;  Surgeon:  Bernice Rosenbaum MD;  [facility]    COLONOSCOPY N/A 05/12/2016    Procedure: COMBINED COLONOSCOPY, SINGLE OR MULTIPLE BIOPSY/POLYPECTOMY BY BIOPSY;  Surgeon: Ana Paula Urbina MD;  [facility]    DECOMPRESSION CUBITAL TUNNEL Left 08/31/2023    Procedure: LEFT CUBITAL TUNNEL RELEASE,;  Surgeon: Deny Dixon MD;  [place] [facility] [place]    ENDOSCOPY UPPER, COLONOSCOPY, COMBINED  10/19/2011    Procedure:COMBINED ENDOSCOPY UPPER, COLONOSCOPY; Upper Endoscopy, Colonoscopy with Polypectomy x4; Surgeon:AMBAR RODRÍGUEZIQ; Location:[facility] OR    ENT SURGERY  01/2016    Ongoing since then    ESOPHAGOSCOPY, GASTROSCOPY, DUODENOSCOPY (EGD), COMBINED  03/28/2014    Procedure: COMBINED ESOPHAGOSCOPY, GASTROSCOPY, DUODENOSCOPY (EGD);  EGD, Gastric Biopsies, Esophageal Banding;  Surgeon: Reyna Tovar MD;  [facility]    ESOPHAGOSCOPY, GASTROSCOPY, DUODENOSCOPY (EGD), COMBINED  06/09/2014    Procedure: COMBINED ESOPHAGOSCOPY, GASTROSCOPY, DUODENOSCOPY (EGD);  Surgeon: Curtis Mendez MD;  Location:  GI    ESOPHAGOSCOPY, GASTROSCOPY, DUODENOSCOPY (EGD), COMBINED  07/24/2014    Procedure: COMBINED ESOPHAGOSCOPY, GASTROSCOPY, DUODENOSCOPY (EGD);  Surgeon: Gerard Samaniego MD;  [facility]    ESOPHAGOSCOPY, GASTROSCOPY, DUODENOSCOPY (EGD), COMBINED N/A 10/31/2014    Procedure: COMBINED ESOPHAGOSCOPY, GASTROSCOPY, DUODENOSCOPY (EGD);  Surgeon: Gerard Samaniego MD;  [facility]    ESOPHAGOSCOPY, GASTROSCOPY, DUODENOSCOPY (EGD), COMBINED N/A 05/12/2016    Procedure: COMBINED ESOPHAGOSCOPY, GASTROSCOPY, DUODENOSCOPY (EGD);  Surgeon: Ana Paula Urbina MD;  [facility]    ESOPHAGOSCOPY, GASTROSCOPY, DUODENOSCOPY (EGD), COMBINED N/A 08/02/2018    Procedure: COMBINED ESOPHAGOSCOPY, GASTROSCOPY, DUODENOSCOPY (EGD);  EGD;  Surgeon: Yu Wagner MD;  [facility]    HCL SQUAMOUS CELL CARCINOMA AG  10/2007    left forearm    HERNIORRHAPHY UMBILICAL  11/08/2012     Procedure: HERNIORRHAPHY UMBILICAL;  Open Umbilical Hernia Repair With Mesh ;  Surgeon: Chase Nicholson MD;  [facility]    INSERT STIMULATOR DORSAL COLUMN N/A 06/28/2018    Procedure: INSERT STIMULATOR DORSAL COLUMN;;  Surgeon: Elvis Sauceda MD;  [facility]    IR FLUORO 0-1 HOUR  1/23/2025    IR LOWER EXTREMITY ANGIOGRAM RIGHT  04/23/2024    neuro stimulator  2010    PICC DOUBLE LUMEN PLACEMENT Left 01/16/2025    Lateral brachial, 47 cm, 3 cm external length    RELEASE CARPAL TUNNEL Left 08/31/2023    Procedure: LEFT OPEN CARPAL TUNNEL RELEASE,;  Surgeon: Deny Dixon MD;  [facility]    RELEASE TRIGGER FINGER Left 08/31/2023    Procedure: Release left trigger finger thumb;  Surgeon: Deny Dixon MD;  [facility]    REMOVE EXTRACORPORAL MEMBRANE OXYGENATOR ADULT N/A 01/03/2025    Procedure: Remove Left Leg Peripheral Extracorporal Membrane Oxygenator and Closure;  Surgeon: Bernice Rosenbaum MD;  [facility]    REMOVE GENERATOR STIMULATOR (LOCATION) N/A 06/28/2018    Procedure: REMOVE GENERATOR STIMULATOR (LOCATION);  Spinal Cord Stimulator and IPG Explant and Re-Implant of SCS System (Leads and IPG);  Surgeon: Elvis Sauceda MD;  [facility]    REPAIR TENDON ACHILLES Right 11/11/2020    Procedure: Right achilles debridement and partial calcaneus excision;  Surgeon: Eh Sandoval MD;  Location[facility]    SURGICAL HISTORY OF -   01/2002    abcess tooth    SURGICAL HISTORY OF -   1999    L4-5 laminectomy, cauda equina syndrome    SURGICAL HISTORY OF -   +    herniated disk repair    TONSILLECTOMY  10/1964    TRANSPOSITION ULNAR NERVE (ELBOW)      right    ZZC APPENDECTOMY  1974       Prior to Admission Medications   Prior to Admission Medications   Prescriptions Last Dose Informant Patient Reported? Taking?   Continuous Blood Gluc  (FREESTYLE CACHORRO 2 READER) GASTON  Self No No   Sig: USE TO READ BLOOD SUGARS AS PER  'S INSTRUCTIONS   acetaminophen (TYLENOL) 325 MG tablet   No No   Sig: Take 2 tablets (650 mg) by mouth every 8 hours as needed for mild pain.   albuterol (PROAIR HFA/PROVENTIL HFA/VENTOLIN HFA) 108 (90 Base) MCG/ACT inhaler  Self No No   Sig: INHALE 2 PUFFS BY MOUTH EVERY 6 HOURS AS NEEDED FOR SHORTNESS OF BREATH OR WHEEZING   aspirin (ASA) 81 MG chewable tablet   No No   Sig: Take 1 tablet (81 mg) by mouth or Feeding Tube daily.   baclofen (LIORESAL) 10 MG tablet  Self Yes No   Sig: Take 10 mg by mouth 3 times daily as needed for muscle spasms.   blood glucose monitoring (FREESTYLE FREEDOM LITE) meter device kit  Self No No   Sig: Use to test blood sugar 3 times daily or as directed.   gabapentin (NEURONTIN) 300 MG capsule   No No   Sig: Take 1 capsule (300 mg) by mouth daily at 4pm.   gabapentin (NEURONTIN) 300 MG capsule   No No   Sig: Take 3 capsules (900 mg) by mouth at bedtime.   glucose 40 % (400 mg/mL) gel   No No   Sig: Take 15-30 g by mouth every 15 minutes as needed for low blood sugar.   insulin glargine (LANTUS PEN) 100 UNIT/ML pen   No No   Sig: Inject 15 Units subcutaneously at bedtime.   insulin pen needle (GLOBAL EASE INJECT PEN NEEDLES) 32G X 4 MM miscellaneous  Self No No   Sig: USE AS DIRECTED EVERY DAY   losartan (COZAAR) 25 MG tablet   No No   Sig: Take 1 tablet (25 mg) by mouth daily.   medical cannabis (Patient's own supply)  Self Yes No   Sig: See Admin Instructions (The purpose of this order is to document that the patient reports taking medical cannabis.  This is not a prescription, and is not used to certify that the patient has a qualifying medical condition.)   Flower - PRN   methocarbamol (ROBAXIN) 500 MG tablet   No No   Sig: Take 1 tablet (500 mg) by mouth 3 times daily as needed for muscle spasms.   metoprolol tartrate (LOPRESSOR) 50 MG tablet   No No   Sig: Take 1 tablet (50 mg) by mouth 2 times daily.   multivitamin, therapeutic (THERA-VIT) TABS tablet   No No   Sig:  Take 1 tablet by mouth or Feeding Tube daily.   ondansetron (ZOFRAN) 4 MG tablet  Self No No   Sig: Take 1 tablet (4 mg) by mouth 2 times daily as needed for nausea.   oxyCODONE IR (ROXICODONE) 10 MG tablet   No No   Sig: Take 1 tablet (10 mg) by mouth 3 times daily as needed for severe pain.   ramelteon (ROZEREM) 8 MG tablet   No No   Sig: Take 1 tablet (8 mg) by mouth at bedtime.   rosuvastatin (CRESTOR) 40 MG tablet  Self No No   Sig: Take 1 tablet (40 mg) by mouth daily   senna-docusate (SENOKOT-S/PERICOLACE) 8.6-50 MG tablet   No No   Sig: Take 1 tablet by mouth or Feeding Tube 2 times daily as needed for constipation.   sertraline (ZOLOFT) 100 MG tablet  Self No No   Sig: TAKE 2 TABLETS BY MOUTH DAILY   tamsulosin (FLOMAX) 0.4 MG capsule   No No   Sig: Take 1 capsule (0.4 mg) by mouth daily.   thiamine (B-1) 100 MG tablet   No No   Sig: Take 1 tablet (100 mg) by mouth daily.   triamterene-HCTZ (DYAZIDE) 37.5-25 MG capsule  Self No No   Sig: Take 1 capsule by mouth every morning      Facility-Administered Medications: None             Physical Exam   Vital Signs: Temp: 97.9  F (36.6  C) Temp src: Oral BP: 139/63 Pulse: 94   Resp: 18 SpO2: 96 % O2 Device: None (Room air)    Weight: 172 lbs 0 oz    Physical Exam   Constitutional: in gi distress dry heaving  HEENT: EOMI  Neck: Symmetric  Cardiovascular: regular without murmurs or gallops  Pulmonary/Chest: CTAB. No respiratory distress.  GI: Soft, mildly tender to lower quadrants, non-distended    Musculoskeletal:  + bilateral lower extremity edema , R BKA  Skin: Skin is warm and dry  Neurological: Alert and oriented   Psychiatric:  euthymic      Medical Decision Making       75 MINUTES SPENT BY ME on the date of service doing chart review, history, exam, documentation & further activities per the note.      Data     I have personally reviewed the following data over the past 24 hrs:    9.6  \   11.8 (L)   / 219     137 100 11.8 /  174 (H)   3.5 23 0.58 (L) \      ALT: 33 AST: 31 AP: 268 (H) TBILI: 0.6   ALB: 3.5 TOT PROTEIN: 6.9 LIPASE: 86 (H)     INR:  1.16 (H) PTT:  N/A   D-dimer:  N/A Fibrinogen:  N/A

## 2025-01-29 ENCOUNTER — DOCUMENTATION ONLY (OUTPATIENT)
Dept: OTHER | Facility: CLINIC | Age: 65
End: 2025-01-29

## 2025-01-29 ENCOUNTER — TELEPHONE (OUTPATIENT)
Dept: ORTHOPEDICS | Facility: CLINIC | Age: 65
End: 2025-01-29

## 2025-01-29 ENCOUNTER — TRANSITIONAL CARE UNIT VISIT (OUTPATIENT)
Dept: GERIATRICS | Facility: CLINIC | Age: 65
End: 2025-01-29
Payer: MEDICARE

## 2025-01-29 VITALS
BODY MASS INDEX: 23.85 KG/M2 | OXYGEN SATURATION: 99 % | TEMPERATURE: 98.4 F | WEIGHT: 170.4 LBS | HEIGHT: 71 IN | SYSTOLIC BLOOD PRESSURE: 124 MMHG | DIASTOLIC BLOOD PRESSURE: 62 MMHG | HEART RATE: 76 BPM | RESPIRATION RATE: 18 BRPM

## 2025-01-29 DIAGNOSIS — A08.4 VIRAL GASTROENTERITIS: ICD-10-CM

## 2025-01-29 DIAGNOSIS — M25.571 CHRONIC PAIN OF RIGHT ANKLE: ICD-10-CM

## 2025-01-29 DIAGNOSIS — E78.5 HYPERLIPIDEMIA LDL GOAL <100: ICD-10-CM

## 2025-01-29 DIAGNOSIS — S86.011A RUPTURE OF RIGHT ACHILLES TENDON, INITIAL ENCOUNTER: ICD-10-CM

## 2025-01-29 DIAGNOSIS — I10 HYPERTENSION GOAL BP (BLOOD PRESSURE) < 130/80: ICD-10-CM

## 2025-01-29 DIAGNOSIS — F33.0 MAJOR DEPRESSIVE DISORDER, RECURRENT EPISODE, MILD: ICD-10-CM

## 2025-01-29 DIAGNOSIS — Z86.69 HX OF ENCEPHALOPATHY: ICD-10-CM

## 2025-01-29 DIAGNOSIS — G47.00 INSOMNIA, UNSPECIFIED TYPE: ICD-10-CM

## 2025-01-29 DIAGNOSIS — M21.6X1 ACQUIRED CALCANEUS DEFORMITY OF RIGHT ANKLE: ICD-10-CM

## 2025-01-29 DIAGNOSIS — Z97.8 FOLEY CATHETER IN PLACE: ICD-10-CM

## 2025-01-29 DIAGNOSIS — Z95.1 S/P CABG (CORONARY ARTERY BYPASS GRAFT): ICD-10-CM

## 2025-01-29 DIAGNOSIS — M62.81 GENERALIZED MUSCLE WEAKNESS: ICD-10-CM

## 2025-01-29 DIAGNOSIS — E11.9 TYPE 2 DIABETES, HBA1C GOAL < 7% (H): ICD-10-CM

## 2025-01-29 DIAGNOSIS — R19.7 DIARRHEA, UNSPECIFIED TYPE: ICD-10-CM

## 2025-01-29 DIAGNOSIS — R53.81 PHYSICAL DECONDITIONING: Primary | ICD-10-CM

## 2025-01-29 DIAGNOSIS — Z86.74 HISTORY OF CARDIAC ARREST: ICD-10-CM

## 2025-01-29 DIAGNOSIS — G62.9 NEUROPATHY: ICD-10-CM

## 2025-01-29 DIAGNOSIS — Z95.1 S/P CABG X 4: ICD-10-CM

## 2025-01-29 DIAGNOSIS — G89.4 CHRONIC PAIN SYNDROME: ICD-10-CM

## 2025-01-29 DIAGNOSIS — I48.0 PAF (PAROXYSMAL ATRIAL FIBRILLATION) (H): ICD-10-CM

## 2025-01-29 DIAGNOSIS — Z98.890 H/O FOOT SURGERY: ICD-10-CM

## 2025-01-29 DIAGNOSIS — A04.72 C. DIFFICILE COLITIS: ICD-10-CM

## 2025-01-29 DIAGNOSIS — N17.9 AKI (ACUTE KIDNEY INJURY): ICD-10-CM

## 2025-01-29 DIAGNOSIS — Z87.09 HX OF PNEUMOTHORAX: ICD-10-CM

## 2025-01-29 DIAGNOSIS — Z87.01 HISTORY OF PNEUMONIA: ICD-10-CM

## 2025-01-29 DIAGNOSIS — E46 MALNUTRITION, UNSPECIFIED TYPE: ICD-10-CM

## 2025-01-29 DIAGNOSIS — R33.9 URINARY RETENTION WITH INCOMPLETE BLADDER EMPTYING: ICD-10-CM

## 2025-01-29 DIAGNOSIS — E66.01 MORBID OBESITY (H): ICD-10-CM

## 2025-01-29 DIAGNOSIS — Z89.511 S/P BELOW KNEE AMPUTATION, RIGHT (H): ICD-10-CM

## 2025-01-29 DIAGNOSIS — G89.29 CHRONIC PAIN OF RIGHT ANKLE: ICD-10-CM

## 2025-01-29 DIAGNOSIS — I70.228 ATHEROSCLEROSIS OF NATIVE ARTERIES OF EXTREMITIES WITH REST PAIN, OTHER EXTREMITY (H): ICD-10-CM

## 2025-01-29 DIAGNOSIS — J90 BILATERAL PLEURAL EFFUSION: ICD-10-CM

## 2025-01-29 DIAGNOSIS — F11.20 UNCOMPLICATED OPIOID DEPENDENCE (H): ICD-10-CM

## 2025-01-29 DIAGNOSIS — A08.11 NOROVIRUS: ICD-10-CM

## 2025-01-29 DIAGNOSIS — M65.28 CALCIFIC ACHILLES TENDINITIS: ICD-10-CM

## 2025-01-29 DIAGNOSIS — Z91.89 AT RISK FOR PROLONGED QT INTERVAL SYNDROME: ICD-10-CM

## 2025-01-29 DIAGNOSIS — R13.10 DYSPHAGIA, UNSPECIFIED TYPE: ICD-10-CM

## 2025-01-29 DIAGNOSIS — I25.119 CORONARY ARTERY DISEASE INVOLVING NATIVE CORONARY ARTERY OF NATIVE HEART WITH ANGINA PECTORIS: ICD-10-CM

## 2025-01-29 DIAGNOSIS — E87.6 HYPOKALEMIA: ICD-10-CM

## 2025-01-29 DIAGNOSIS — M79.671 INTRACTABLE RIGHT HEEL PAIN: ICD-10-CM

## 2025-01-29 DIAGNOSIS — J96.01 ACUTE RESPIRATORY FAILURE WITH HYPOXIA (H): ICD-10-CM

## 2025-01-29 DIAGNOSIS — K21.9 GASTROESOPHAGEAL REFLUX DISEASE WITHOUT ESOPHAGITIS: ICD-10-CM

## 2025-01-29 DIAGNOSIS — K70.30 ALCOHOLIC CIRRHOSIS OF LIVER WITHOUT ASCITES (H): ICD-10-CM

## 2025-01-29 DIAGNOSIS — F41.9 ANXIETY: ICD-10-CM

## 2025-01-29 DIAGNOSIS — I10 HYPERTENSION GOAL BP (BLOOD PRESSURE) < 140/90: ICD-10-CM

## 2025-01-29 PROBLEM — L97.313 SKIN ULCER OF RIGHT ANKLE WITH NECROSIS OF MUSCLE (H): Status: RESOLVED | Noted: 2023-11-07 | Resolved: 2025-01-29

## 2025-01-29 PROCEDURE — 99310 SBSQ NF CARE HIGH MDM 45: CPT | Performed by: NURSE PRACTITIONER

## 2025-01-29 RX ORDER — TRIAMTERENE AND HYDROCHLOROTHIAZIDE 37.5; 25 MG/1; MG/1
1 CAPSULE ORAL EVERY MORNING
Status: SHIPPED
Start: 2025-01-29

## 2025-01-29 RX ORDER — METOPROLOL TARTRATE 50 MG
50 TABLET ORAL 2 TIMES DAILY
Status: SHIPPED
Start: 2025-01-29

## 2025-01-29 RX ORDER — LOSARTAN POTASSIUM 25 MG/1
25 TABLET ORAL DAILY
Status: SHIPPED
Start: 2025-01-29

## 2025-01-29 RX ORDER — CALCIUM CARBONATE 500 MG/1
1 TABLET, CHEWABLE ORAL 3 TIMES DAILY PRN
Qty: 30 TABLET | Refills: 0 | Status: SHIPPED | OUTPATIENT
Start: 2025-01-29

## 2025-01-29 NOTE — TELEPHONE ENCOUNTER
Spoke with [facility] staff at main desk.  Discussed that the reason he was coming to see Dr Dixon was to discuss his EMG results, and the EMG was already cancelled for 2-3-25.  (He should be symptom free for 48 hours prior to coming to clinic, which would be Saturday, and most likely would not be symptom free in 3 days?)  Will cancel appointment.  TCU informed he needs his EMG rescheduled with an office visit with Dr Dixon to follow and discuss. They verbalized agreement/understanding. Liz Sánchez RN

## 2025-01-29 NOTE — PROGRESS NOTES
[facility]    PRIMARY CARE PROVIDER AND CLINIC:  Joel Daniel Wegener, MD, [address] [address] / [facility] [id number]  Chief Complaint   Patient presents with    Hospital F/U      [facility] Medical Record Number:  [id number]  Place of Service where encounter took place:  [facility] (TCU)(SNF) [[id number]]    Erwin Aguilar  is a 64 year old  (1960), admitted to the above facility from  [facility]. Hospital stay 12/31/24 through 1/25/25..   HPI:    Erwin Aguilar is a 64 year old male with a past medical history of DM2, HTN, PVD, and neuropathic pain who initially presented on 12/31/24 to the [facility] ED with arm pain, vomiting, and diarrhea; found to have ACS and ST depression. He subsequently had VT arrest with 1 round of CPR, epinephrine, defibrillation. After ROSC was found to be in afib with RVR. Became hypotensive and was subsequently cannulated for VA ECMO. Found to have multivessel disease s/p CABG x4 on 1/2/25 with Dr. Rosenbaum. ECMO decannulated on 1/3/25 at bedside. ICU course complicated by large R PTX seen on chest CT on 1/4/25 s/p bedside CT placement, VAP, and encephalopathy. Extubated 1/11/25.    PROCEDURES PERFORMED:   Date: 1/2/2025.  Surgeon: Dr. Chad Rosenbaum   1.  Coronary artery bypass grafting x 4   -reversed saphenous vein graft to the right posterior descending coronary artery  -reversed saphenous vein graft to the obtuse marginal branch of the left circumflex coronary artery  -reversed saphenous vein graft to the ramus intermedius coronary artery  -in-situ left internal mammary artery to the left anterior descending coronary artery.  2.  Endoscopic harvest of the greater saphenous vein from the left lower extremity.  3.  Left atrial appendage ligation with a 50mm Atriclip  4.  Left atrial ablation with the Atricure Encompass clamp     Date: 1/2/2025.  Surgeon: Dr. Chad Rosenbaum   1.  VA-ECMO decannulation  (right femoral artery / right femoral vein)  2.  Primary repair of right femoral artery cannulation site.  3.  Primary repair of right femoral venous cannulation site    HOSPITAL COURSE:   Erwin Aguilar is a 64 year old male who underwent the above-named procedures.  He tolerated the operation well and postoperatively was admitted to the CVICU.  He was extubated on 1/11/25 to 3 lpm via NC with neuro status intact.  Prior to discharge, his pain was controlled well, he was able to perform most ADLs and ambulate with assistance, and had full return of bowel and bladder function.  On January 25, 2025, he was discharged to TCU in stable condition.     Went out to ED on 1/28/25. Per records:   Erwin Aguilar is a 64 year old male admitted on 1/28/2025. He has a complex medical history including a recently admission to the ICU from 12/31-1/25 for STEMI with VT arrest requiring CPR and requiring ECMO s/p CABG, who presented 1/27 from TCU with several hours of vomiting, diarrhea, and abdominal pain.     Improved overnight in ED  stable  By end of HOD#2, appetite increased and eager for discharge  TCU still has bed from yesterday  Unremarkable eval  Stool studies pending, but do not preclude discharge  Discharged to TCU today  Remains on lantus 7u daily for now, can uptitrate back to lantus 15u PTA as able at TCU    The primary encounter diagnosis was Physical deconditioning. Diagnoses of Generalized muscle weakness, Coronary artery disease involving native coronary artery of native heart with angina pectoris, S/P CABG (coronary artery bypass graft), Hypertension goal BP (blood pressure) < 140/90, Hyperlipidemia LDL goal <100, At risk for prolonged QT interval syndrome, PAF (paroxysmal atrial fibrillation) (H), History of cardiac arrest, Acute respiratory failure with hypoxia (H), Hx of pneumothorax, History of pneumonia, Bilateral pleural effusion, Hx of encephalopathy, Neuropathy, Insomnia, unspecified type, Anxiety,  "Chronic pain syndrome, Major depressive disorder, recurrent episode, mild, Uncomplicated opioid dependence (H), AUSTIN (acute kidney injury), Hypokalemia, Urinary retention with incomplete bladder emptying, Rodriguez catheter in place, Dysphagia, unspecified type, Malnutrition, unspecified type, Diarrhea, unspecified type, Gastroesophageal reflux disease without esophagitis, Type 2 diabetes, HbA1c goal < 7% (H), Viral gastroenteritis, S/P below knee amputation, right (H), Morbid obesity (H), Atherosclerosis of native arteries of extremities with rest pain, other extremity (H), Alcoholic cirrhosis of liver without ascites (H), C. difficile colitis, H/O foot surgery, Intractable right heel pain, Rupture of right Achilles tendon, initial encounter, Acquired calcaneus deformity of right ankle, Chronic pain of right ankle, Calcific Achilles tendinitis, and Norovirus were also pertinent to this visit.    Met with patient who was found sitting at edge of bed. He denies any chest pain, palpitations, shortness of breath, CHAVEZ, lightheadedness, dizziness, or cough. Chest incision appears stable. He denies any further itch or concerns to area either. He reports his abdominal pain has improved, but some GERD like symptoms persist. He also reports some abdominal cramping at times. He was updated once again regarding stool testing results from ED visit yesterday. It was noted to be positive for c diff and norovirus. He reports that his stools have been slowly improving. No further odors. Reports not having so many stools, now only having around 3 thus far today. He endorses his stools also appear to be bulking up a bit. Denies dysuria or frequency. Rodriguez catheter remains in place. Urine is clear and yellow. Appetite good. Not sleeping well on site due to reports of uncomfortable mattress, verbalizing, \"I have a better bed at home.\" His goal is to return home with wife and home care needs. He is wanting to have walk program as his goal is " to stay on TCU for 2 weeks. Mood stable. Pleasant and cooperative. Reports pain stable with current regimen.     BP Readings from Last 3 Encounters:   01/29/25 124/62   01/28/25 132/69   01/28/25 127/71     Wt Readings from Last 5 Encounters:   01/29/25 77.3 kg (170 lb 6.4 oz)   01/28/25 78 kg (172 lb)   01/28/25 77.3 kg (170 lb 6.4 oz)   01/25/25 77.2 kg (170 lb 3.1 oz)   08/05/24 88.1 kg (194 lb 4.8 oz)     CODE STATUS/ADVANCE DIRECTIVES DISCUSSION:  Prior  CPR/Full code   ALLERGIES:   Allergies   Allergen Reactions    Levaquin Anaphylaxis and Rash     Swelling in lip and tongue felt thick    Lisinopril Anaphylaxis     Swollen tongue; inability to swallow; drooling; hives; swollen face, neck, angioedema    Acetaminophen      Hx of cirrhosis     Amlodipine      Swelling, hives, possible angioedema      Morphine      b/p dropped and arms went numb  Hypotension    Quinolones Hives    Spironolactone Unknown     Pt believes himself to be allergic to it; cannot find his old records of this.-mjc     Bactrim [Sulfamethoxazole-Trimethoprim] Rash      PAST MEDICAL HISTORY:   Past Medical History:   Diagnosis Date    Actinic keratosis     aldara    Anxiety 12/1997    Cancer (H)     squamous cell skin CA    Cauda equina spinal cord injury (H)     Chronic sinusitis 05/01/2016    Cirrhosis of liver (H) 04/01/2014    Not biopsy-proven as of 4/1/14.      Depressive disorder     Diastasis recti     Esophageal reflux     Esophageal varices in cirrhosis (H) 04/01/2014    Hospitalized for UGI blee 3/28/14, endoscopy revealed bleeding varices.    Essential hypertension, benign     Intermittent asthma     Mild depression     Mixed hyperlipidemia     Nasal polyps 05/01/2016    Nausea vomiting and diarrhea 1/28/2025    Osteoarthritis of lumbar spine, unspecified spinal osteoarthritis complication status     Other chronic pain     Paroxysmal atrial fibrillation (H) 09/22/2021    SCCA (squamous cell carcinoma) of skin     Seasonal allergic  conjunctivitis     Type II or unspecified type diabetes mellitus without mention of complication, not stated as uncontrolled     Unspecified site of spinal cord injury without evidence of spinal bone injury     due to back surgery      PAST SURGICAL HISTORY:   has a past surgical history that includes surgical history of -  (01/2002); surgical history of -  (1999); APPENDECTOMY (1974); SQUAMOUS CELL CARCINOMA AG (10/2007); surgical history of -  (+); neuro stimulator (2010); Herniorrhaphy umbilical (11/08/2012); Endoscopy upper, colonoscopy, combined (10/19/2011); Transposition ulnar nerve (elbow); Esophagoscopy, gastroscopy, duodenoscopy (EGD), combined (03/28/2014); Esophagoscopy, gastroscopy, duodenoscopy (EGD), combined (06/09/2014); Esophagoscopy, gastroscopy, duodenoscopy (EGD), combined (07/24/2014); Esophagoscopy, gastroscopy, duodenoscopy (EGD), combined (N/A, 10/31/2014); Colonoscopy (N/A, 05/12/2016); Esophagoscopy, gastroscopy, duodenoscopy (EGD), combined (N/A, 05/12/2016); Tonsillectomy (10/1964); ENT surgery (01/2016); back surgery (08/2009); Remove Generator Stimulator (Location) (N/A, 06/28/2018); Insert stimulator dorsal column (N/A, 06/28/2018); Esophagoscopy, gastroscopy, duodenoscopy (EGD), combined (N/A, 08/02/2018); Repair tendon achilles (Right, 11/11/2020); Abdomen surgery (2014); Decompression cubital tunnel (Left, 08/31/2023); Release carpal tunnel (Left, 08/31/2023); Release trigger finger (Left, 08/31/2023); IR Lower Extremity Angiogram Right (04/23/2024); Angiogram (Right, 04/23/2024); Amputate leg below knee (Right, 05/09/2024); Bypass graft artery coronary (N/A, 01/02/2025); Remove Extracorporal Membrane Oxygenator Adult (N/A, 01/03/2025); PICC/Midline Placement (Left, 01/16/2025); and IR Fluoro 0-1 Hour (1/23/2025).  FAMILY HISTORY: family history includes Breast Cancer in his mother; Cancer in his father and mother; Diabetes in his brother, brother, and brother; Other Cancer in his  mother; Snoring in his father.  SOCIAL HISTORY:   reports that he quit smoking about 40 years ago. His smoking use included cigarettes. He started smoking about 41 years ago. He has been exposed to tobacco smoke. He has never used smokeless tobacco. He reports that he does not currently use alcohol. He reports current drug use. Frequency: 2.00 times per week. Drug: Marijuana.  Patient's living condition: lives with spouse    Post Discharge Medication Reconciliation Status:   MED REC REQUIRED  Post Medication Reconciliation Status:          Current Outpatient Medications   Medication Sig Dispense Refill    calcium carbonate (TUMS) 500 MG chewable tablet Take 1 tablet (500 mg) by mouth 3 times daily as needed for heartburn. 30 tablet 0    oxyCODONE IR (ROXICODONE) 10 MG tablet Take 1 tablet (10 mg) by mouth 3 times daily as needed for severe pain. 30 tablet 0    pantoprazole (PROTONIX) 40 MG EC tablet Take 1 tablet (40 mg) by mouth daily. 30 tablet 0    vancomycin (VANCOCIN) 125 MG capsule Take 1 capsule (125 mg) by mouth 4 times daily for 10 days. 40 capsule 0    acetaminophen (TYLENOL) 325 MG tablet Take 2 tablets (650 mg) by mouth every 8 hours as needed for mild pain.      albuterol (PROAIR HFA/PROVENTIL HFA/VENTOLIN HFA) 108 (90 Base) MCG/ACT inhaler INHALE 2 PUFFS BY MOUTH EVERY 6 HOURS AS NEEDED FOR SHORTNESS OF BREATH OR WHEEZING 18 g 1    aspirin (ASA) 81 MG chewable tablet Take 1 tablet (81 mg) by mouth or Feeding Tube daily.      baclofen (LIORESAL) 10 MG tablet Take 10 mg by mouth 3 times daily as needed for muscle spasms.      blood glucose monitoring (FREESTYLE FREEDOM LITE) meter device kit Use to test blood sugar 3 times daily or as directed. 1 kit 0    Continuous Blood Gluc  (FREESTYLE CACHORRO 2 READER) GASTON USE TO READ BLOOD SUGARS AS PER 'S INSTRUCTIONS 1 each 0    gabapentin (NEURONTIN) 300 MG capsule Take 1 capsule (300 mg) by mouth daily at 4pm.      gabapentin (NEURONTIN) 300  MG capsule Take 3 capsules (900 mg) by mouth at bedtime.      glucose 40 % (400 mg/mL) gel Take 15-30 g by mouth every 15 minutes as needed for low blood sugar.      insulin glargine (LANTUS PEN) 100 UNIT/ML pen Inject 7 Units subcutaneously at bedtime.      insulin pen needle (GLOBAL EASE INJECT PEN NEEDLES) 32G X 4 MM miscellaneous USE AS DIRECTED EVERY  each 1    losartan (COZAAR) 25 MG tablet Take 1 tablet (25 mg) by mouth daily.      medical cannabis (Patient's own supply) See Admin Instructions (The purpose of this order is to document that the patient reports taking medical cannabis.  This is not a prescription, and is not used to certify that the patient has a qualifying medical condition.)   Flower - PRN      methocarbamol (ROBAXIN) 500 MG tablet Take 1 tablet (500 mg) by mouth 3 times daily as needed for muscle spasms.      metoprolol tartrate (LOPRESSOR) 50 MG tablet Take 1 tablet (50 mg) by mouth 2 times daily.      multivitamin, therapeutic (THERA-VIT) TABS tablet Take 1 tablet by mouth or Feeding Tube daily.      ondansetron (ZOFRAN) 4 MG tablet Take 1 tablet (4 mg) by mouth 2 times daily as needed for nausea. 180 tablet 3    ramelteon (ROZEREM) 8 MG tablet Take 1 tablet (8 mg) by mouth at bedtime.      rosuvastatin (CRESTOR) 40 MG tablet Take 1 tablet (40 mg) by mouth daily 90 tablet 3    senna-docusate (SENOKOT-S/PERICOLACE) 8.6-50 MG tablet Take 1 tablet by mouth or Feeding Tube 2 times daily as needed for constipation.      sertraline (ZOLOFT) 100 MG tablet TAKE 2 TABLETS BY MOUTH DAILY 180 tablet 2    tamsulosin (FLOMAX) 0.4 MG capsule Take 1 capsule (0.4 mg) by mouth daily.      thiamine (B-1) 100 MG tablet Take 1 tablet (100 mg) by mouth daily.      triamterene-HCTZ (DYAZIDE) 37.5-25 MG capsule Take 1 capsule by mouth every morning 90 capsule 1     No current facility-administered medications for this visit.       ROS:  10 point ROS of systems including Constitutional, Eyes, Respiratory,  "Cardiovascular, Gastroenterology, Genitourinary, Integumentary, Musculoskeletal, Psychiatric were all negative except for pertinent positives noted in my HPI.    Vitals:  /62   Pulse 76   Temp 98.4  F (36.9  C)   Resp 18   Ht 1.803 m (5' 11\")   Wt 77.3 kg (170 lb 6.4 oz)   SpO2 99%   BMI 23.77 kg/m    Exam:  GENERAL APPEARANCE:  Alert, in no distress, cooperative  ENT:  Mouth and posterior oropharynx normal, moist mucous membranes, normal hearing acuity  EYES:  EOM, conjunctivae, lids, pupils and irises normal  NECK:  No adenopathy,masses or thyromegaly  RESP:  respiratory effort and palpation of chest normal, lungs clear to auscultation , no respiratory distress  CV:  regular rate and rhythm, no murmur, rub, or gallop, no edema, +2 pedal pulses  ABDOMEN:  normal bowel sounds, soft, nontender, no hepatosplenomegaly or other masses, no guarding or rebound  M/S:   Ambulates with walker. Staff to assist with cares  SKIN:  Inspection of skin and subcutaneous tissue baseline, wound healing well, no signs of infection healing well  NEURO:   Cranial nerves 2-12 are normal tested and grossly at patient's baseline, no purposeful movement in upper and lower extremities  PSYCH:  oriented X 3, affect and mood normal    Lab/Diagnostic data:  Most Recent 3 CBC's:  Recent Labs   Lab Test 01/28/25  0315 01/25/25  0411 01/24/25  1709   WBC 9.6 6.8 7.9   HGB 11.8* 9.3* 10.3*   MCV 94 95 93    222 255     Most Recent 3 BMP's:  Recent Labs   Lab Test 01/28/25  1446 01/28/25  0947 01/28/25  0315 01/25/25  0738 01/25/25  0411 01/24/25  0715 01/24/25  0454   NA  --   --  137  --  142  --  139   POTASSIUM  --   --  3.5  --  3.7  --  3.3*   CHLORIDE  --   --  100  --  107  --  104   CO2  --   --  23  --  27  --  27   BUN  --   --  11.8  --  8.0  --  8.2   CR  --   --  0.58*  --  0.60*  --  0.60*   ANIONGAP  --   --  14  --  8  --  8   [place]  --   --  8.8  --  8.4*  --  9.0   * 137* 174*   < > 122*   < > 64*    < > = " values in this interval not displayed.     Most Recent 2 LFT's:  Recent Labs   Lab Test 01/28/25  0315 01/04/25  0216   AST 31 472*   ALT 33 213*   ALKPHOS 268* 66   BILITOTAL 0.6 0.7     Most Recent 3 Troponin's:  Recent Labs   Lab Test 10/19/21  2150   TROPONIN <0.015     Most Recent D-dimer:  Recent Labs   Lab Test 01/03/25  0336   DD 0.79*     Most Recent Cholesterol Panel:  Recent Labs   Lab Test 04/23/24  1155   CHOL 143   LDL 70   HDL 60   TRIG 65     7-Day Micro Results       Collected Updated Procedure Result Status      01/28/2025 1353 01/28/2025 1846 Enteric Bacteria and Virus Panel PCR [[id number]]    (Abnormal)   Stool from Per Rectum    Final result Component Value   Campylobacter species Negative   Salmonella species Negative   Vibrio species Negative   Vibrio cholerae Negative   Yersinia enterocolitica Negative   Enteropathogenic E. coli (EPEC) Negative   Shiga-like toxin-producing E. coli (STEC) Negative   Shigella/Enteroinvasive E. coli (EIEC) Negative   Cryptosporidium species Negative   Giardia lamblia Negative   Norovirus Gl/Gll Positive   Per  notice, the Norovirus positive target may be a false positive result; please correlate with clinical findings. The virus may persist for many weeks to months.   Rotavirus A Negative   Plesiomonas shigelloides Negative   Enteroaggregative E. coli (EAEC) Negative   Enterotoxigenic E. coli (ETEC) Negative   E. coli O157 NA   Cyclospora cayetanensis Negative   Entamoeba histolytica Negative   Adenovirus F40/41 Negative   Astrovirus Negative   Sapovirus Negative            01/28/2025 1353 01/28/2025 1619 C. difficile Toxin B PCR with reflex to C. difficile Antigen and Toxins A/B EIA [[id number]]    (Abnormal)   Stool from Per Rectum    Final result Component Value   C Difficile Toxin B by PCR Positive   Detection of C. difficile nucleic acid in stools confirms the presence of these organisms in diarrheal patients but may not indicate that C.  difficile is the etiologic agent of the diarrhea. Results from the Xpert C. difficile assay should be interpreted in conjunction with other laboratory and clinical data available to the clinician.    Patients with a positive C. difficile PCR result will receive reflex GDH/Toxin Immunoassay testing. Please interpret the PCR test result in conjunction with GDH/Toxin Immunoassay results and the clinical status of patient.            01/28/2025 1353 01/28/2025 1748 C. difficile Antigen and Toxins A/B by Enzyme Immunoassay [[id number]]    (Abnormal)   Stool from Per Rectum    Final result Component Value   C. difficile GDH Antigen Positive   C. difficile Toxin Positive            01/28/2025 0401 01/28/2025 0514 Influenza A/B, RSV and SARS-CoV2 PCR (COVID-19) Nose [[id number]]    Swab from Nose    Final result Component Value   Influenza A PCR Negative   Influenza B PCR Negative   RSV PCR Negative   SARS CoV2 PCR Negative   NEGATIVE: SARS-CoV-2 (COVID-19) RNA not detected, presumed negative.            01/27/2025 0940 01/28/2025 1328 Quantiferon TB Gold Plus Grey Tube [[id number]]   Blood from Arm, Right    Final result Component Value Units   Quantiferon Nil Tube 0.00 IU/mL            01/27/2025 0940 01/28/2025 1328 Quantiferon TB Gold Plus Green Tube [[id number]]   Blood from Arm, Right    Final result Component Value Units   Quantiferon TB1 Tube 0.00 IU/mL            01/27/2025 0940 01/28/2025 1309 Quantiferon TB Gold Plus Yellow Tube [[id number]]   Blood from Arm, Right    Final result Component Value   Quantiferon TB2 Tube 0.01            01/27/2025 0940 01/28/2025 1308 Quantiferon TB Gold Plus Purple Tube [[id number]]   Blood from Arm, Right    Final result Component Value Units   Quantiferon Mitogen 10.00 IU/mL            01/27/2025 0940 01/28/2025 1442 Quantiferon TB Gold Plus [[id number]]   Blood from Arm, Right    Final result Component Value Units   Quantiferon-TB Gold Plus Negative    No  interferon gamma response to M.tuberculosis antigens was detected. Infection with M.tuberculosis is unlikely, however a single negative result does not exclude infection. In patients at high risk for infection, a second test should be considered in accordance with the 2017 ATS/IDSA/CDC Clinical Pract  ice Guidelines for Diagnosis of Tuberculosis in Adults and Children    TB1 Ag minus Nil Value 0.00 IU/mL   TB2 Ag minus Nil Value 0.01 IU/mL   Mitogen minus Nil Result 10.00 IU/mL   Nil Result 0.00 IU/mL                  Most Recent TSH and T4:  Recent Labs   Lab Test 01/02/25  0333   TSH 0.48   T4 0.99     Most Recent Hemoglobin A1c:  Recent Labs   Lab Test 12/31/24  1327   A1C 5.7*     Most Recent Urinalysis:  Recent Labs   Lab Test 01/28/25  0401   COLOR Orange*   APPEARANCE Slightly Cloudy*   URINEGLC Negative   URINEBILI Negative   URINEKETONE Negative   SG 1.021   UBLD Large*   URINEPH 6.5   PROTEIN 70*   NITRITE Negative   LEUKEST Trace*   RBCU >182*   WBCU 4     Most Recent ESR & CRP:  Recent Labs   Lab Test 01/19/25  0437 01/18/25  0506 01/02/25  0333   SED  --   --  28*   CRPI 17.30*   < > 105.00*    < > = values in this interval not displayed.     Most Recent Anemia Panel:  Recent Labs   Lab Test 01/28/25  0315   WBC 9.6   HGB 11.8*   HCT 37.1*   MCV 94        Most Recent CPK:  Recent Labs   Lab Test 01/02/25  1722 01/02/25  1514   * 816*     Magnesium   Date Value Ref Range Status   01/28/2025 1.5 (L) 1.7 - 2.3 mg/dL Final   07/11/2015 1.9 1.6 - 2.3 mg/dL Final     ASSESSMENT/PLAN:    (R53.81) Physical deconditioning  (primary encounter diagnosis)  (M62.81) Generalized muscle weakness  Comment: Acute on chronic. S/T below diagnosis.   Plan:   -Continue Physical therapy and Occupational therapy as directed  -SW to remain involved for safe discharge planning    (I25.119) Coronary artery disease involving native coronary artery of native heart with angina pectoris  (Z95.1) S/P CABG (coronary  artery bypass graft)  (I10) Hypertension goal BP (blood pressure) < 140/90  (E78.5) Hyperlipidemia LDL goal <100  (Z91.89) At risk for prolonged QT interval syndrome  (I48.0) PAF (paroxysmal atrial fibrillation) (H)  (Z86.74) History of cardiac arrest  (E66.01) Morbid obesity  Comment: initially presented on 12/31/24 to the [facility] ED with arm pain, vomiting, and diarrhea; found to have ACS and ST depression. He subsequently had VT arrest with 1 round of CPR, epinephrine, defibrillation. After ROSC was found to be in afib with RVR. Became hypotensive and was subsequently cannulated for VA ECMO. Found to have multivessel disease s/p CABG x4 on 1/2/25 with Dr. Rosenbaum. ECMO decannulated on 1/3/25 at bedside. Post operative ECHO 1/5 with LVEF 55-60%, normal RV function, no pericardial effusion. 1/17 Repeat echo in setting of therapeutic AC showed LVEF 60-65%, small, loculated pericardial effusion without HD compromise. Echo 1/21 showed EF 55-60%, large pericardial effusion without HD compromise, and RA pressure of 8 mmHg  Plan:   -Monitor BP and HR as directed  -outpatient CT and echo scheduled for 2/18/25  -Follow up with cardiology as directed. Scheduled for 2/26/25  -Continue asa 81mg daily  -Continue rosuvastatin 40mg daily  -Continue metoprolol tartrate 50mg BID. HOLD if SBP<100 and/or HR<55  -Continue losartan 25mg daily. HOLD if SBP<100  -CMP, CBC and vitamin D due 2/3/25    (J96.01) Acute respiratory failure with hypoxia (H)  (Z87.09) Hx of pneumothorax  (Z87.01) History of pneumonia  (J90) Bilateral pleural effusion  Comment:  ECMO decannulated on 1/3/25 at bedside. ICU course complicated by large R PTX seen on chest CT on 1/4/25 s/p bedside CT placement, VAP, and encephalopathy. Extubated 1/11/25.   Plan:   -Monitor respiratory status  -Monitor for worsening s/symptoms of concerns  -Encourage Incentive spirometry every 4 hours while awake  -PRN albuterol as indicated and advised  -Encourage activity OOB  -CMP, CBC  and vitamin D due 2/3/25    (Z86.69) Hx of encephalopathy  (G62.9) Neuropathy  (G47.00) Insomnia, unspecified type  (F41.9) Anxiety  (G89.4) Chronic pain syndrome  (F33.0) Major depressive disorder, recurrent episode, mild  (F11.20) Uncomplicated opioid dependence (H)  Comment: Chronic. Encephalopathy resolved PTA. Chronic opioid use noted per chart review. Declines hydroxyzine orders, however per chart review it appears this should have been discontinued in the s/o delirium. 1/1 vEEG without seizure activity (severe encephalopathy) and 12/31, 1/4, and 1/7 CTH negative for acute findings.   Plan:   -Monitor mood and behaviors  -Monitor for changes in mobility, eating and sleeping patterns  -Continue thiamine 100mg daily  -Continue gabapentin 300mg daily at 1600 and 900mg at HS  -Continue Tylenol 650mg every 8 hours PRN  -Continue oxycodone 10mg TID PRN  -HOLD medical cannabis while on TCU  -Continue robaxin 500mg TID PRN. Recommend to discontinue if not used  -Continue PTA baclofen 10mg TID PRN as directed  -Continue PTA sertraline 200mg daily as directed  -Continue ramelteon 8mg for sleep at HS as directed  -CMP, CBC and vitamin D due 2/3/25    (N17.9) [place] (acute kidney injury)  (E87.6) Hypokalemia  Comment: Acute on chronic. Improving. BL creat ~ 0.6-0.8, creatinine at baseline; adequate UOP with multiple unmeasured voids. Only Bed Weights recorded. Prosthesis now fitting, can get standing weight for baseline. Patient reports home weight around 180-182 lbs.   Plan:   -Monitor weights daily as directed. Recent weight 170# on TCU  -Continue to monitor for worsening s/symptoms of concerns  -Avoid nephrotoxins  -Continue PTA triamterene/hydrochlorothiazide 37.5/25mg as directed for now. Adjust accordingly. HOLD if SBP<100  -Monitor urinary status  -Renally dose medications as directed  -CMP, CBC and vitamin D due 2/3/25    (R33.9) Urinary retention with incomplete bladder emptying  (Z97.8) Rodriguez catheter in  place  Comment: Chronic. Per chart review, multiple attempts to remove ca catheter was in place without success. Patient asked for ca catheter to be replaced.   Plan:   -Continue ca catheter cares as directed  -Replace catheter every 4 weeks and PRN  -Follow up with urology as directed. Scheduled for 2/17/25  -Continue flomax 0.4mg daily  -CMP, CBC and vitamin D due 2/3/25    (R13.10) Dysphagia, unspecified type  (E46) Malnutrition, unspecified type  (R19.7) Diarrhea, unspecified type  (K21.9) Gastroesophageal reflux disease without esophagitis  (K70.30) Alcoholic cirrhosis of liver without ascites  Comment: Chronic. S/T above  Plan:   -Monitor BM patterns  -Monitor weights as directed  -Monitor for worsening s/symptoms of concerns  -Senna S 1 tab BID PRN  -Continue stress ulcer prophylaxis: Pantoprazole 40mg daily x 30 days  -Dietician to remain involved on site.   -CMP, CBC and vitamin D due 2/3/25    (E11.9) Type 2 diabetes, HbA1c goal < 7% (H)  Comment: Chronic. Recent A1c~ 5.7%. Initially managed on insulin drip postop, transitioned to sliding scale & Lantus   Plan:   -Monitor Blood Glucose TID as directed  -Reduce insulin lantus down to 7units at HS. HOLD if Blood Glucose<120 (PTa dose was 26 units at HS)  -CMP, CBC and vitamin D due 2/3/25        (S21.109A) Wound of sternal region  (L30.9) Dermatitis  (Z86.2) Hx of leukocytosis  Comment: Acute on chronic. Recent history of sternal incision dermatitis-Skin glue remained in place and appeared to be irritating skin. Skin glue removed with adhesive remover. Patient reported that PRN atarax was helpful with itching, can continue with PRN atarax, however patient did not approve of its use on TCU, therefore this medication was discontinued. - 1/10 C. Diff neg. 1/14 infectious work-up for worsening leukocytosis negative thus far; follow for final results. 1/14 BLE US neg for DVT. 1/15 2-view CXR with low lung volumes, small bilateral pleural effusions but  too small for drainage. Defer respiratory viral panel given stable on RA, afebrile. Klebsiella pneumoniae from sputum cultures on 12/31 and 1/9. Completed course of Zosyn from 1/9-1/20. Zosyn and vancomycin were restarted 1/9 for fever. Sputum culture again grew Klebsiella pneumoniae. 1/11 noted to have small puss drainage from sternotomy. CT chest on 1/12/25, not concerning for deep infection. CT CAP 1/17 to evaluate abd pain, continued unexplained leukocytosis. Demonstrated large substernal fluid collection. Discontinue Vanc & Zosyn per Dr. Rosenbaum, and monitor off abx. Off all antibiotics since 1/20.  WBC remain normal. IR on Thursday 1/23/2025 per proceduralist:  Image guided access into retrosternal collection via a subxiphoid approach. Aspiration revealed scant serosanguinous return. No drain placed. Impression is consistent with hematoma. Patient declines CT scan to assess for residual collection. Patient refused repeat CT with contrast on 1/25/25.   Plan:   -Monitor for worsening s/symptoms of concerns  -Continue wound care as directed: Wound Care Right Groin: Wet to dry Nugauze strip light packing.  -Continue stress ulcer prophylaxis: Pantoprazole 40mg daily x 30 days  -DVT prophylaxis, HELD AC with substernal hematoma.   -Encourage activity OOB  -Continue sternal precautions  -Plan to follow up ECHO and CT chest with contrast prior to follow up appointment as indicated above  -CMP, CBC and vitamin D due 2/3/25    (D62) Anemia due to blood loss, acute  (D75.839) Thrombocytosis  (Z86.2) Hx of thrombocytopenia  Comment: Acut mika chronic. Likely reactive. Hgb stable; Plt WNL, no signs or symptoms of active bleeding   Plan:   - PTA apixaban had been resumed for hx of PAF now s/p MAZE and LAAC with plan to defer long-term AC plan to OP Cardiology. Apixaban held on 1/20/25 with concern for growing substernal fluid collection now thought to be hematoma. Will plan to hold at discharge per Dr. Rosenbaum.   -Monitor for  bleeding risks  -Continue stress ulcer prophylaxis: Pantoprazole 40mg daily x 30 days  -Monitor for worsening s/symptoms of concerns  -CMP, CBC and vitamin D due 2/3/25    (Z89.511) History of below-knee amputation of right lower extremity (H)  Comment: Chronic. Noted per chart review.     (A08.4) Viral gastroenteritis  (A04.72) C difficle colitis  (A08.11) Norovirus  Comment: Acute. Went out to ED on 1/28 with several hours of vomiting, diarrhea, and abdominal pain. Per ED records: Erwin's diarrhea and vomiting as well as abdominal pain is concerning for enteritis, c.diff, pancreatitis, cholecystitis, and the flu. Work up revealed CBC consistent with baseline, CMP mostly consistent with baseline but with alkaline phosphatase 268. Low Mg which we replaced. Lipase mildly elevated to 86. Negative for flu/rsv/covid, UA with large amount of blood but otherwise consistent with previous UA no sign of infection. Given patient's abdominal symptoms and elevated alk phos along with mildly elevated lipase wanted to obtain CT CAP. However, patient did not want to do that at this time. Compromised and did an abdominal ultrasound. Ultrasound showed tiny stone in the gallbladder without gallbladder inflammation, partial view of the pancreas with the portion of the pancreas within view appearing normal. Patient most likely has some form of viral enteritis given symptom profile, lab values, and vital signs. However, unable to rule out pancreatitis at this time given no CT and poorly visualized pancrease on ultrasound. Stool testing back positive with C diff  Plan:  -Start vancomycin 125mg QID x 10 days  -Continue entneric precautions as directed  -Monitor BM patterns  -Continue zofran PRN  -add TUMS PRN  -Continue stress ulcer prophylaxis: Pantoprazole 40mg daily x 30 days  -Monitor for worsening s/symptoms of concerns  -CMP, CBC and vitamin D due 2/3/25    48 minutes spent on the date of the encounter doing chart review, history  and exam, PMH, documentation and further activities as noted above. Collaboration with nursing staff on site, clinical managers, and therapies were completed on site today.     Electronically signed by:  Dr. Allison Conley DNP, APRN, FNP-C, WCS-C, EDS-C    Provider reviewed records from facility, and interpreted most recent imaging/lab work, and vital signs.   Acute and chronic conditions managed by writer. Have been reviewed during today's exam.

## 2025-01-29 NOTE — TELEPHONE ENCOUNTER
Other: Patient has a post op appointment scheduled for 2/3/25 and has tested positive for Norovirus C-dif. Care facility is calling to let Dr. Dixon and team know and wanted to know if appointment with patient should be rescheduled or can keep appointment     Could we send this information to you in [facility] or would you prefer to receive a phone call?:   Patient would prefer a phone call   Okay to leave a detailed message?: Yes at Other phone number:  [phone]

## 2025-01-29 NOTE — LETTER
1/29/2025      Erwin Aguilar  [address]  [place] [address]        [facility] GERIATRICS    PRIMARY CARE PROVIDER AND CLINIC:  Joel Daniel Wegener, MD, [address] [address]  Chief Complaint   Patient presents with     Hospital F/U      [facility] Medical Record Number:  [id number]  Place of Service where encounter took place:  [facility] (TCU)(SNF) [[id number]]    Erwin Aguilar  is a 64 year old  (1960), admitted to the above facility from  [facility]. Hospital stay 12/31/24 through 1/25/25..   HPI:    Erwin Aguilar is a 64 year old male with a past medical history of DM2, HTN, PVD, and neuropathic pain who initially presented on 12/31/24 to the [facility] ED with arm pain, vomiting, and diarrhea; found to have ACS and ST depression. He subsequently had VT arrest with 1 round of CPR, epinephrine, defibrillation. After ROSC was found to be in afib with RVR. Became hypotensive and was subsequently cannulated for VA ECMO. Found to have multivessel disease s/p CABG x4 on 1/2/25 with Dr. Rosenbaum. ECMO decannulated on 1/3/25 at bedside. ICU course complicated by large R PTX seen on chest CT on 1/4/25 s/p bedside CT placement, VAP, and encephalopathy. Extubated 1/11/25.    PROCEDURES PERFORMED:   Date: 1/2/2025.  Surgeon: Dr. Chad Rosenbaum   1.  Coronary artery bypass grafting x 4   -reversed saphenous vein graft to the right posterior descending coronary artery  -reversed saphenous vein graft to the obtuse marginal branch of the left circumflex coronary artery  -reversed saphenous vein graft to the ramus intermedius coronary artery  -in-situ left internal mammary artery to the left anterior descending coronary artery.  2.  Endoscopic harvest of the greater saphenous vein from the left lower extremity.  3.  Left atrial appendage ligation with a 50mm Atriclip  4.  Left atrial ablation with the Atricure  Encompass clamp     Date: 1/2/2025.  Surgeon: Dr. Chad Rosenbaum   1.  VA-ECMO decannulation (right femoral artery / right femoral vein)  2.  Primary repair of right femoral artery cannulation site.  3.  Primary repair of right femoral venous cannulation site    HOSPITAL COURSE:   Erwin Aguilar is a 64 year old male who underwent the above-named procedures.  He tolerated the operation well and postoperatively was admitted to the CVICU.  He was extubated on 1/11/25 to 3 lpm via NC with neuro status intact.  Prior to discharge, his pain was controlled well, he was able to perform most ADLs and ambulate with assistance, and had full return of bowel and bladder function.  On January 25, 2025, he was discharged to TCU in stable condition.     Went out to ED on 1/28/25. Per records:   Erwin Aguilar is a 64 year old male admitted on 1/28/2025. He has a complex medical history including a recently admission to the ICU from 12/31-1/25 for STEMI with VT arrest requiring CPR and requiring ECMO s/p CABG, who presented 1/27 from TCU with several hours of vomiting, diarrhea, and abdominal pain.     Improved overnight in ED  stable  By end of HOD#2, appetite increased and eager for discharge  TCU still has bed from yesterday  Unremarkable eval  Stool studies pending, but do not preclude discharge  Discharged to TCU today  Remains on lantus 7u daily for now, can uptitrate back to lantus 15u PTA as able at TCU    The primary encounter diagnosis was Physical deconditioning. Diagnoses of Generalized muscle weakness, Coronary artery disease involving native coronary artery of native heart with angina pectoris, S/P CABG (coronary artery bypass graft), Hypertension goal BP (blood pressure) < 140/90, Hyperlipidemia LDL goal <100, At risk for prolonged QT interval syndrome, PAF (paroxysmal atrial fibrillation) (H), History of cardiac arrest, Acute respiratory failure with hypoxia (H), Hx of pneumothorax, History of pneumonia, Bilateral  "pleural effusion, Hx of encephalopathy, Neuropathy, Insomnia, unspecified type, Anxiety, Chronic pain syndrome, Major depressive disorder, recurrent episode, mild, Uncomplicated opioid dependence (H), AUSTIN (acute kidney injury), Hypokalemia, Urinary retention with incomplete bladder emptying, Rodriguez catheter in place, Dysphagia, unspecified type, Malnutrition, unspecified type, Diarrhea, unspecified type, Gastroesophageal reflux disease without esophagitis, Type 2 diabetes, HbA1c goal < 7% (H), Viral gastroenteritis, S/P below knee amputation, right (H), Morbid obesity (H), Atherosclerosis of native arteries of extremities with rest pain, other extremity (H), Alcoholic cirrhosis of liver without ascites (H), C. difficile colitis, H/O foot surgery, Intractable right heel pain, Rupture of right Achilles tendon, initial encounter, Acquired calcaneus deformity of right ankle, Chronic pain of right ankle, Calcific Achilles tendinitis, and Norovirus were also pertinent to this visit.    Met with patient who was found sitting at edge of bed. He denies any chest pain, palpitations, shortness of breath, CHAVEZ, lightheadedness, dizziness, or cough. Chest incision appears stable. He denies any further itch or concerns to area either. He reports his abdominal pain has improved, but some GERD like symptoms persist. He also reports some abdominal cramping at times. He was updated once again regarding stool testing results from ED visit yesterday. It was noted to be positive for c diff and norovirus. He reports that his stools have been slowly improving. No further odors. Reports not having so many stools, now only having around 3 thus far today. He endorses his stools also appear to be bulking up a bit. Denies dysuria or frequency. Rodriguez catheter remains in place. Urine is clear and yellow. Appetite good. Not sleeping well on site due to reports of uncomfortable mattress, verbalizing, \"I have a better bed at home.\" His goal is to " return home with wife and home care needs. He is wanting to have walk program as his goal is to stay on TCU for 2 weeks. Mood stable. Pleasant and cooperative. Reports pain stable with current regimen.     BP Readings from Last 3 Encounters:   01/29/25 124/62   01/28/25 132/69   01/28/25 127/71     Wt Readings from Last 5 Encounters:   01/29/25 77.3 kg (170 lb 6.4 oz)   01/28/25 78 kg (172 lb)   01/28/25 77.3 kg (170 lb 6.4 oz)   01/25/25 77.2 kg (170 lb 3.1 oz)   08/05/24 88.1 kg (194 lb 4.8 oz)     CODE STATUS/ADVANCE DIRECTIVES DISCUSSION:  Prior  CPR/Full code   ALLERGIES:   Allergies   Allergen Reactions     Levaquin Anaphylaxis and Rash     Swelling in lip and tongue felt thick     Lisinopril Anaphylaxis     Swollen tongue; inability to swallow; drooling; hives; swollen face, neck, angioedema     Acetaminophen      Hx of cirrhosis      Amlodipine      Swelling, hives, possible angioedema       Morphine      b/p dropped and arms went numb  Hypotension     Quinolones Hives     Spironolactone Unknown     Pt believes himself to be allergic to it; cannot find his old records of this.-mjc      Bactrim [Sulfamethoxazole-Trimethoprim] Rash      PAST MEDICAL HISTORY:   Past Medical History:   Diagnosis Date     Actinic keratosis     aldara     Anxiety 12/1997     Cancer (H)     squamous cell skin CA     Cauda equina spinal cord injury (H)      Chronic sinusitis 05/01/2016     Cirrhosis of liver (H) 04/01/2014    Not biopsy-proven as of 4/1/14.       Depressive disorder      Diastasis recti      Esophageal reflux      Esophageal varices in cirrhosis (H) 04/01/2014    Hospitalized for UGI blee 3/28/14, endoscopy revealed bleeding varices.     Essential hypertension, benign      Intermittent asthma      Mild depression      Mixed hyperlipidemia      Nasal polyps 05/01/2016     Nausea vomiting and diarrhea 1/28/2025     Osteoarthritis of lumbar spine, unspecified spinal osteoarthritis complication status      Other chronic  pain      Paroxysmal atrial fibrillation (H) 09/22/2021     SCCA (squamous cell carcinoma) of skin      Seasonal allergic conjunctivitis      Type II or unspecified type diabetes mellitus without mention of complication, not stated as uncontrolled      Unspecified site of spinal cord injury without evidence of spinal bone injury     due to back surgery      PAST SURGICAL HISTORY:   has a past surgical history that includes surgical history of -  (01/2002); surgical history of -  (1999); APPENDECTOMY (1974); SQUAMOUS CELL CARCINOMA AG (10/2007); surgical history of -  (+); neuro stimulator (2010); Herniorrhaphy umbilical (11/08/2012); Endoscopy upper, colonoscopy, combined (10/19/2011); Transposition ulnar nerve (elbow); Esophagoscopy, gastroscopy, duodenoscopy (EGD), combined (03/28/2014); Esophagoscopy, gastroscopy, duodenoscopy (EGD), combined (06/09/2014); Esophagoscopy, gastroscopy, duodenoscopy (EGD), combined (07/24/2014); Esophagoscopy, gastroscopy, duodenoscopy (EGD), combined (N/A, 10/31/2014); Colonoscopy (N/A, 05/12/2016); Esophagoscopy, gastroscopy, duodenoscopy (EGD), combined (N/A, 05/12/2016); Tonsillectomy (10/1964); ENT surgery (01/2016); back surgery (08/2009); Remove Generator Stimulator (Location) (N/A, 06/28/2018); Insert stimulator dorsal column (N/A, 06/28/2018); Esophagoscopy, gastroscopy, duodenoscopy (EGD), combined (N/A, 08/02/2018); Repair tendon achilles (Right, 11/11/2020); Abdomen surgery (2014); Decompression cubital tunnel (Left, 08/31/2023); Release carpal tunnel (Left, 08/31/2023); Release trigger finger (Left, 08/31/2023); IR Lower Extremity Angiogram Right (04/23/2024); Angiogram (Right, 04/23/2024); Amputate leg below knee (Right, 05/09/2024); Bypass graft artery coronary (N/A, 01/02/2025); Remove Extracorporal Membrane Oxygenator Adult (N/A, 01/03/2025); PICC/Midline Placement (Left, 01/16/2025); and IR Fluoro 0-1 Hour (1/23/2025).  FAMILY HISTORY: family history includes  Breast Cancer in his mother; Cancer in his father and mother; Diabetes in his brother, brother, and brother; Other Cancer in his mother; Snoring in his father.  SOCIAL HISTORY:   reports that he quit smoking about 40 years ago. His smoking use included cigarettes. He started smoking about 41 years ago. He has been exposed to tobacco smoke. He has never used smokeless tobacco. He reports that he does not currently use alcohol. He reports current drug use. Frequency: 2.00 times per week. Drug: Marijuana.  Patient's living condition: lives with spouse    Post Discharge Medication Reconciliation Status:   MED REC REQUIRED  Post Medication Reconciliation Status:          Current Outpatient Medications   Medication Sig Dispense Refill     calcium carbonate (TUMS) 500 MG chewable tablet Take 1 tablet (500 mg) by mouth 3 times daily as needed for heartburn. 30 tablet 0     oxyCODONE IR (ROXICODONE) 10 MG tablet Take 1 tablet (10 mg) by mouth 3 times daily as needed for severe pain. 30 tablet 0     pantoprazole (PROTONIX) 40 MG EC tablet Take 1 tablet (40 mg) by mouth daily. 30 tablet 0     vancomycin (VANCOCIN) 125 MG capsule Take 1 capsule (125 mg) by mouth 4 times daily for 10 days. 40 capsule 0     acetaminophen (TYLENOL) 325 MG tablet Take 2 tablets (650 mg) by mouth every 8 hours as needed for mild pain.       albuterol (PROAIR HFA/PROVENTIL HFA/VENTOLIN HFA) 108 (90 Base) MCG/ACT inhaler INHALE 2 PUFFS BY MOUTH EVERY 6 HOURS AS NEEDED FOR SHORTNESS OF BREATH OR WHEEZING 18 g 1     aspirin (ASA) 81 MG chewable tablet Take 1 tablet (81 mg) by mouth or Feeding Tube daily.       baclofen (LIORESAL) 10 MG tablet Take 10 mg by mouth 3 times daily as needed for muscle spasms.       blood glucose monitoring (FREESTYLE FREEDOM LITE) meter device kit Use to test blood sugar 3 times daily or as directed. 1 kit 0     Continuous Blood Gluc  (FREESTYLE CACHORRO 2 READER) GASTON USE TO READ BLOOD SUGARS AS PER 'S  INSTRUCTIONS 1 each 0     gabapentin (NEURONTIN) 300 MG capsule Take 1 capsule (300 mg) by mouth daily at 4pm.       gabapentin (NEURONTIN) 300 MG capsule Take 3 capsules (900 mg) by mouth at bedtime.       glucose 40 % (400 mg/mL) gel Take 15-30 g by mouth every 15 minutes as needed for low blood sugar.       insulin glargine (LANTUS PEN) 100 UNIT/ML pen Inject 7 Units subcutaneously at bedtime.       insulin pen needle (GLOBAL EASE INJECT PEN NEEDLES) 32G X 4 MM miscellaneous USE AS DIRECTED EVERY  each 1     losartan (COZAAR) 25 MG tablet Take 1 tablet (25 mg) by mouth daily.       medical cannabis (Patient's own supply) See Admin Instructions (The purpose of this order is to document that the patient reports taking medical cannabis.  This is not a prescription, and is not used to certify that the patient has a qualifying medical condition.)   Flower - PRN       methocarbamol (ROBAXIN) 500 MG tablet Take 1 tablet (500 mg) by mouth 3 times daily as needed for muscle spasms.       metoprolol tartrate (LOPRESSOR) 50 MG tablet Take 1 tablet (50 mg) by mouth 2 times daily.       multivitamin, therapeutic (THERA-VIT) TABS tablet Take 1 tablet by mouth or Feeding Tube daily.       ondansetron (ZOFRAN) 4 MG tablet Take 1 tablet (4 mg) by mouth 2 times daily as needed for nausea. 180 tablet 3     ramelteon (ROZEREM) 8 MG tablet Take 1 tablet (8 mg) by mouth at bedtime.       rosuvastatin (CRESTOR) 40 MG tablet Take 1 tablet (40 mg) by mouth daily 90 tablet 3     senna-docusate (SENOKOT-S/PERICOLACE) 8.6-50 MG tablet Take 1 tablet by mouth or Feeding Tube 2 times daily as needed for constipation.       sertraline (ZOLOFT) 100 MG tablet TAKE 2 TABLETS BY MOUTH DAILY 180 tablet 2     tamsulosin (FLOMAX) 0.4 MG capsule Take 1 capsule (0.4 mg) by mouth daily.       thiamine (B-1) 100 MG tablet Take 1 tablet (100 mg) by mouth daily.       triamterene-HCTZ (DYAZIDE) 37.5-25 MG capsule Take 1 capsule by mouth every morning  "90 capsule 1     No current facility-administered medications for this visit.       ROS:  10 point ROS of systems including Constitutional, Eyes, Respiratory, Cardiovascular, Gastroenterology, Genitourinary, Integumentary, Musculoskeletal, Psychiatric were all negative except for pertinent positives noted in my HPI.    Vitals:  /62   Pulse 76   Temp 98.4  F (36.9  C)   Resp 18   Ht 1.803 m (5' 11\")   Wt 77.3 kg (170 lb 6.4 oz)   SpO2 99%   BMI 23.77 kg/m    Exam:  GENERAL APPEARANCE:  Alert, in no distress, cooperative  ENT:  Mouth and posterior oropharynx normal, moist mucous membranes, normal hearing acuity  EYES:  EOM, conjunctivae, lids, pupils and irises normal  NECK:  No adenopathy,masses or thyromegaly  RESP:  respiratory effort and palpation of chest normal, lungs clear to auscultation , no respiratory distress  CV:  regular rate and rhythm, no murmur, rub, or gallop, no edema, +2 pedal pulses  ABDOMEN:  normal bowel sounds, soft, nontender, no hepatosplenomegaly or other masses, no guarding or rebound  M/S:   Ambulates with walker. Staff to assist with cares  SKIN:  Inspection of skin and subcutaneous tissue baseline, wound healing well, no signs of infection healing well  NEURO:   Cranial nerves 2-12 are normal tested and grossly at patient's baseline, no purposeful movement in upper and lower extremities  PSYCH:  oriented X 3, affect and mood normal    Lab/Diagnostic data:  Most Recent 3 CBC's:  Recent Labs   Lab Test 01/28/25  0315 01/25/25  0411 01/24/25  1709   WBC 9.6 6.8 7.9   HGB 11.8* 9.3* 10.3*   MCV 94 95 93    222 255     Most Recent 3 BMP's:  Recent Labs   Lab Test 01/28/25  1446 01/28/25  0947 01/28/25  0315 01/25/25  0738 01/25/25  0411 01/24/25  0715 01/24/25  0454   NA  --   --  137  --  142  --  139   POTASSIUM  --   --  3.5  --  3.7  --  3.3*   CHLORIDE  --   --  100  --  107  --  104   CO2  --   --  23  --  27  --  27   BUN  --   --  11.8  --  8.0  --  8.2   CR  --   " --  0.58*  --  0.60*  --  0.60*   ANIONGAP  --   --  14  --  8  --  8   [place]  --   --  8.8  --  8.4*  --  9.0   * 137* 174*   < > 122*   < > 64*    < > = values in this interval not displayed.     Most Recent 2 LFT's:  Recent Labs   Lab Test 01/28/25  0315 01/04/25  0216   AST 31 472*   ALT 33 213*   ALKPHOS 268* 66   BILITOTAL 0.6 0.7     Most Recent 3 Troponin's:  Recent Labs   Lab Test 10/19/21  2150   TROPONIN <0.015     Most Recent D-dimer:  Recent Labs   Lab Test 01/03/25  0336   DD 0.79*     Most Recent Cholesterol Panel:  Recent Labs   Lab Test 04/23/24  1155   CHOL 143   LDL 70   HDL 60   TRIG 65     7-Day Micro Results       Collected Updated Procedure Result Status      01/28/2025 1353 01/28/2025 1846 Enteric Bacteria and Virus Panel PCR [[id number]]    (Abnormal)   Stool from Per Rectum    Final result Component Value   Campylobacter species Negative   Salmonella species Negative   Vibrio species Negative   Vibrio cholerae Negative   Yersinia enterocolitica Negative   Enteropathogenic E. coli (EPEC) Negative   Shiga-like toxin-producing E. coli (STEC) Negative   Shigella/Enteroinvasive E. coli (EIEC) Negative   Cryptosporidium species Negative   Giardia lamblia Negative   Norovirus Gl/Gll Positive   Per  notice, the Norovirus positive target may be a false positive result; please correlate with clinical findings. The virus may persist for many weeks to months.   Rotavirus A Negative   Plesiomonas shigelloides Negative   Enteroaggregative E. coli (EAEC) Negative   Enterotoxigenic E. coli (ETEC) Negative   E. coli O157 NA   Cyclospora cayetanensis Negative   Entamoeba histolytica Negative   Adenovirus F40/41 Negative   Astrovirus Negative   Sapovirus Negative            01/28/2025 1353 01/28/2025 1619 C. difficile Toxin B PCR with reflex to C. difficile Antigen and Toxins A/B EIA [[id number]]    (Abnormal)   Stool from Per Rectum    Final result Component Value   C Difficile Toxin B  by PCR Positive   Detection of C. difficile nucleic acid in stools confirms the presence of these organisms in diarrheal patients but may not indicate that C. difficile is the etiologic agent of the diarrhea. Results from the Xpert C. difficile assay should be interpreted in conjunction with other laboratory and clinical data available to the clinician.    Patients with a positive C. difficile PCR result will receive reflex GDH/Toxin Immunoassay testing. Please interpret the PCR test result in conjunction with GDH/Toxin Immunoassay results and the clinical status of patient.            01/28/2025 1353 01/28/2025 1748 C. difficile Antigen and Toxins A/B by Enzyme Immunoassay [[id number]]    (Abnormal)   Stool from Per Rectum    Final result Component Value   C. difficile GDH Antigen Positive   C. difficile Toxin Positive            01/28/2025 0401 01/28/2025 0514 Influenza A/B, RSV and SARS-CoV2 PCR (COVID-19) Nose [[id number]]    Swab from Nose    Final result Component Value   Influenza A PCR Negative   Influenza B PCR Negative   RSV PCR Negative   SARS CoV2 PCR Negative   NEGATIVE: SARS-CoV-2 (COVID-19) RNA not detected, presumed negative.            01/27/2025 0940 01/28/2025 1328 Quantiferon TB Gold Plus Grey Tube [[id number]]   Blood from Arm, Right    Final result Component Value Units   Quantiferon Nil Tube 0.00 IU/mL            01/27/2025 0940 01/28/2025 1328 Quantiferon TB Gold Plus Green Tube [[id number]]   Blood from Arm, Right    Final result Component Value Units   Quantiferon TB1 Tube 0.00 IU/mL            01/27/2025 0940 01/28/2025 1309 Quantiferon TB Gold Plus Yellow Tube [[id number]]   Blood from Arm, Right    Final result Component Value   Quantiferon TB2 Tube 0.01            01/27/2025 0940 01/28/2025 1308 Quantiferon TB Gold Plus Purple Tube [[id number]]   Blood from Arm, Right    Final result Component Value Units   Quantiferon Mitogen 10.00 IU/mL            01/27/2025 0940  01/28/2025 1442 Quantiferon TB Gold Plus [[id number]]   Blood from Arm, Right    Final result Component Value Units   Quantiferon-TB Gold Plus Negative    No interferon gamma response to M.tuberculosis antigens was detected. Infection with M.tuberculosis is unlikely, however a single negative result does not exclude infection. In patients at high risk for infection, a second test should be considered in accordance with the 2017 ATS/IDSA/CDC Clinical Pract  ice Guidelines for Diagnosis of Tuberculosis in Adults and Children    TB1 Ag minus Nil Value 0.00 IU/mL   TB2 Ag minus Nil Value 0.01 IU/mL   Mitogen minus Nil Result 10.00 IU/mL   Nil Result 0.00 IU/mL                  Most Recent TSH and T4:  Recent Labs   Lab Test 01/02/25  0333   TSH 0.48   T4 0.99     Most Recent Hemoglobin A1c:  Recent Labs   Lab Test 12/31/24  1327   A1C 5.7*     Most Recent Urinalysis:  Recent Labs   Lab Test 01/28/25  0401   COLOR Orange*   APPEARANCE Slightly Cloudy*   URINEGLC Negative   URINEBILI Negative   URINEKETONE Negative   SG 1.021   UBLD Large*   URINEPH 6.5   PROTEIN 70*   NITRITE Negative   LEUKEST Trace*   RBCU >182*   WBCU 4     Most Recent ESR & CRP:  Recent Labs   Lab Test 01/19/25  0437 01/18/25  0506 01/02/25  0333   SED  --   --  28*   CRPI 17.30*   < > 105.00*    < > = values in this interval not displayed.     Most Recent Anemia Panel:  Recent Labs   Lab Test 01/28/25  0315   WBC 9.6   HGB 11.8*   HCT 37.1*   MCV 94        Most Recent CPK:  Recent Labs   Lab Test 01/02/25  1722 01/02/25  1514   * 816*     Magnesium   Date Value Ref Range Status   01/28/2025 1.5 (L) 1.7 - 2.3 mg/dL Final   07/11/2015 1.9 1.6 - 2.3 mg/dL Final     ASSESSMENT/PLAN:    (R53.81) Physical deconditioning  (primary encounter diagnosis)  (M62.81) Generalized muscle weakness  Comment: Acute on chronic. S/T below diagnosis.   Plan:   -Continue Physical therapy and Occupational therapy as directed  -SW to remain involved for  safe discharge planning    (I25.119) Coronary artery disease involving native coronary artery of native heart with angina pectoris  (Z95.1) S/P CABG (coronary artery bypass graft)  (I10) Hypertension goal BP (blood pressure) < 140/90  (E78.5) Hyperlipidemia LDL goal <100  (Z91.89) At risk for prolonged QT interval syndrome  (I48.0) PAF (paroxysmal atrial fibrillation) (H)  (Z86.74) History of cardiac arrest  (E66.01) Morbid obesity  Comment: initially presented on 12/31/24 to the [facility] ED with arm pain, vomiting, and diarrhea; found to have ACS and ST depression. He subsequently had VT arrest with 1 round of CPR, epinephrine, defibrillation. After ROSC was found to be in afib with RVR. Became hypotensive and was subsequently cannulated for VA ECMO. Found to have multivessel disease s/p CABG x4 on 1/2/25 with Dr. Rosenbaum. ECMO decannulated on 1/3/25 at bedside. Post operative ECHO 1/5 with LVEF 55-60%, normal RV function, no pericardial effusion. 1/17 Repeat echo in setting of therapeutic AC showed LVEF 60-65%, small, loculated pericardial effusion without HD compromise. Echo 1/21 showed EF 55-60%, large pericardial effusion without HD compromise, and RA pressure of 8 mmHg  Plan:   -Monitor BP and HR as directed  -outpatient CT and echo scheduled for 2/18/25  -Follow up with cardiology as directed. Scheduled for 2/26/25  -Continue asa 81mg daily  -Continue rosuvastatin 40mg daily  -Continue metoprolol tartrate 50mg BID. HOLD if SBP<100 and/or HR<55  -Continue losartan 25mg daily. HOLD if SBP<100  -CMP, CBC and vitamin D due 2/3/25    (J96.01) Acute respiratory failure with hypoxia (H)  (Z87.09) Hx of pneumothorax  (Z87.01) History of pneumonia  (J90) Bilateral pleural effusion  Comment:  ECMO decannulated on 1/3/25 at bedside. ICU course complicated by large R PTX seen on chest CT on 1/4/25 s/p bedside CT placement, VAP, and encephalopathy. Extubated 1/11/25.   Plan:   -Monitor respiratory status  -Monitor for worsening  s/symptoms of concerns  -Encourage Incentive spirometry every 4 hours while awake  -PRN albuterol as indicated and advised  -Encourage activity OOB  -CMP, CBC and vitamin D due 2/3/25    (Z86.69) Hx of encephalopathy  (G62.9) Neuropathy  (G47.00) Insomnia, unspecified type  (F41.9) Anxiety  (G89.4) Chronic pain syndrome  (F33.0) Major depressive disorder, recurrent episode, mild  (F11.20) Uncomplicated opioid dependence (H)  Comment: Chronic. Encephalopathy resolved PTA. Chronic opioid use noted per chart review. Declines hydroxyzine orders, however per chart review it appears this should have been discontinued in the s/o delirium. 1/1 vEEG without seizure activity (severe encephalopathy) and 12/31, 1/4, and 1/7 CTH negative for acute findings.   Plan:   -Monitor mood and behaviors  -Monitor for changes in mobility, eating and sleeping patterns  -Continue thiamine 100mg daily  -Continue gabapentin 300mg daily at 1600 and 900mg at HS  -Continue Tylenol 650mg every 8 hours PRN  -Continue oxycodone 10mg TID PRN  -HOLD medical cannabis while on TCU  -Continue robaxin 500mg TID PRN. Recommend to discontinue if not used  -Continue PTA baclofen 10mg TID PRN as directed  -Continue PTA sertraline 200mg daily as directed  -Continue ramelteon 8mg for sleep at HS as directed  -CMP, CBC and vitamin D due 2/3/25    (N17.9) [place] (acute kidney injury)  (E87.6) Hypokalemia  Comment: Acute on chronic. Improving. BL creat ~ 0.6-0.8, creatinine at baseline; adequate UOP with multiple unmeasured voids. Only Bed Weights recorded. Prosthesis now fitting, can get standing weight for baseline. Patient reports home weight around 180-182 lbs.   Plan:   -Monitor weights daily as directed. Recent weight 170# on TCU  -Continue to monitor for worsening s/symptoms of concerns  -Avoid nephrotoxins  -Continue PTA triamterene/hydrochlorothiazide 37.5/25mg as directed for now. Adjust accordingly. HOLD if SBP<100  -Monitor urinary status  -Renally dose  medications as directed  -CMP, CBC and vitamin D due 2/3/25    (R33.9) Urinary retention with incomplete bladder emptying  (Z97.8) Ca catheter in place  Comment: Chronic. Per chart review, multiple attempts to remove ca catheter was in place without success. Patient asked for ca catheter to be replaced.   Plan:   -Continue ca catheter cares as directed  -Replace catheter every 4 weeks and PRN  -Follow up with urology as directed. Scheduled for 2/17/25  -Continue flomax 0.4mg daily  -CMP, CBC and vitamin D due 2/3/25    (R13.10) Dysphagia, unspecified type  (E46) Malnutrition, unspecified type  (R19.7) Diarrhea, unspecified type  (K21.9) Gastroesophageal reflux disease without esophagitis  (K70.30) Alcoholic cirrhosis of liver without ascites  Comment: Chronic. S/T above  Plan:   -Monitor BM patterns  -Monitor weights as directed  -Monitor for worsening s/symptoms of concerns  -Senna S 1 tab BID PRN  -Continue stress ulcer prophylaxis: Pantoprazole 40mg daily x 30 days  -Dietician to remain involved on site.   -CMP, CBC and vitamin D due 2/3/25    (E11.9) Type 2 diabetes, HbA1c goal < 7% (H)  Comment: Chronic. Recent A1c~ 5.7%. Initially managed on insulin drip postop, transitioned to sliding scale & Lantus   Plan:   -Monitor Blood Glucose TID as directed  -Reduce insulin lantus down to 7units at HS. HOLD if Blood Glucose<120 (PTa dose was 26 units at HS)  -CMP, CBC and vitamin D due 2/3/25        (S21.109A) Wound of sternal region  (L30.9) Dermatitis  (Z86.2) Hx of leukocytosis  Comment: Acute on chronic. Recent history of sternal incision dermatitis-Skin glue remained in place and appeared to be irritating skin. Skin glue removed with adhesive remover. Patient reported that PRN atarax was helpful with itching, can continue with PRN atarax, however patient did not approve of its use on TCU, therefore this medication was discontinued. - 1/10 C. Diff neg. 1/14 infectious work-up for worsening leukocytosis  negative thus far; follow for final results. 1/14 BLE US neg for DVT. 1/15 2-view CXR with low lung volumes, small bilateral pleural effusions but too small for drainage. Defer respiratory viral panel given stable on RA, afebrile. Klebsiella pneumoniae from sputum cultures on 12/31 and 1/9. Completed course of Zosyn from 1/9-1/20. Zosyn and vancomycin were restarted 1/9 for fever. Sputum culture again grew Klebsiella pneumoniae. 1/11 noted to have small puss drainage from sternotomy. CT chest on 1/12/25, not concerning for deep infection. CT CAP 1/17 to evaluate abd pain, continued unexplained leukocytosis. Demonstrated large substernal fluid collection. Discontinue Vanc & Zosyn per Dr. Rosenbaum, and monitor off abx. Off all antibiotics since 1/20.  WBC remain normal. IR on Thursday 1/23/2025 per proceduralist:  Image guided access into retrosternal collection via a subxiphoid approach. Aspiration revealed scant serosanguinous return. No drain placed. Impression is consistent with hematoma. Patient declines CT scan to assess for residual collection. Patient refused repeat CT with contrast on 1/25/25.   Plan:   -Monitor for worsening s/symptoms of concerns  -Continue wound care as directed: Wound Care Right Groin: Wet to dry Nugauze strip light packing.  -Continue stress ulcer prophylaxis: Pantoprazole 40mg daily x 30 days  -DVT prophylaxis, HELD AC with substernal hematoma.   -Encourage activity OOB  -Continue sternal precautions  -Plan to follow up ECHO and CT chest with contrast prior to follow up appointment as indicated above  -CMP, CBC and vitamin D due 2/3/25    (D62) Anemia due to blood loss, acute  (D75.839) Thrombocytosis  (Z86.2) Hx of thrombocytopenia  Comment: Acut mika chronic. Likely reactive. Hgb stable; Plt WNL, no signs or symptoms of active bleeding   Plan:   - PTA apixaban had been resumed for hx of PAF now s/p MAZE and LAAC with plan to defer long-term AC plan to OP Cardiology. Apixaban held on  1/20/25 with concern for growing substernal fluid collection now thought to be hematoma. Will plan to hold at discharge per Dr. Rosenbaum.   -Monitor for bleeding risks  -Continue stress ulcer prophylaxis: Pantoprazole 40mg daily x 30 days  -Monitor for worsening s/symptoms of concerns  -CMP, CBC and vitamin D due 2/3/25    (Z89.511) History of below-knee amputation of right lower extremity (H)  Comment: Chronic. Noted per chart review.     (A08.4) Viral gastroenteritis  (A04.72) C difficle colitis  (A08.11) Norovirus  Comment: Acute. Went out to ED on 1/28 with several hours of vomiting, diarrhea, and abdominal pain. Per ED records: Erwin's diarrhea and vomiting as well as abdominal pain is concerning for enteritis, c.diff, pancreatitis, cholecystitis, and the flu. Work up revealed CBC consistent with baseline, CMP mostly consistent with baseline but with alkaline phosphatase 268. Low Mg which we replaced. Lipase mildly elevated to 86. Negative for flu/rsv/covid, UA with large amount of blood but otherwise consistent with previous UA no sign of infection. Given patient's abdominal symptoms and elevated alk phos along with mildly elevated lipase wanted to obtain CT CAP. However, patient did not want to do that at this time. Compromised and did an abdominal ultrasound. Ultrasound showed tiny stone in the gallbladder without gallbladder inflammation, partial view of the pancreas with the portion of the pancreas within view appearing normal. Patient most likely has some form of viral enteritis given symptom profile, lab values, and vital signs. However, unable to rule out pancreatitis at this time given no CT and poorly visualized pancrease on ultrasound. Stool testing back positive with C diff  Plan:  -Start vancomycin 125mg QID x 10 days  -Continue entneric precautions as directed  -Monitor BM patterns  -Continue zofran PRN  -add TUMS PRN  -Continue stress ulcer prophylaxis: Pantoprazole 40mg daily x 30 days  -Monitor for  worsening s/symptoms of concerns  -CMP, CBC and vitamin D due 2/3/25    48 minutes spent on the date of the encounter doing chart review, history and exam, PMH, documentation and further activities as noted above. Collaboration with nursing staff on site, clinical managers, and therapies were completed on site today.     Electronically signed by:  Dr. Allison Conley DNP, APRN, FNP-C, WCS-C, EDS-C    Provider reviewed records from facility, and interpreted most recent imaging/lab work, and vital signs.   Acute and chronic conditions managed by writer. Have been reviewed during today's exam.                       Sincerely,        Allison Conley, VANCE CNP    Electronically signed

## 2025-01-31 ENCOUNTER — TRANSITIONAL CARE UNIT VISIT (OUTPATIENT)
Dept: GERIATRICS | Facility: CLINIC | Age: 65
End: 2025-01-31
Payer: MEDICARE

## 2025-01-31 VITALS
BODY MASS INDEX: 23.8 KG/M2 | OXYGEN SATURATION: 99 % | HEART RATE: 81 BPM | WEIGHT: 170 LBS | RESPIRATION RATE: 17 BRPM | DIASTOLIC BLOOD PRESSURE: 66 MMHG | TEMPERATURE: 98.2 F | HEIGHT: 71 IN | SYSTOLIC BLOOD PRESSURE: 143 MMHG

## 2025-01-31 DIAGNOSIS — G62.9 NEUROPATHY: ICD-10-CM

## 2025-01-31 DIAGNOSIS — E46 MALNUTRITION, UNSPECIFIED TYPE: ICD-10-CM

## 2025-01-31 DIAGNOSIS — Z91.89 AT RISK FOR PROLONGED QT INTERVAL SYNDROME: ICD-10-CM

## 2025-01-31 DIAGNOSIS — E11.9 TYPE 2 DIABETES, HBA1C GOAL < 7% (H): ICD-10-CM

## 2025-01-31 DIAGNOSIS — A08.4 VIRAL GASTROENTERITIS: ICD-10-CM

## 2025-01-31 DIAGNOSIS — Z86.74 HISTORY OF CARDIAC ARREST: ICD-10-CM

## 2025-01-31 DIAGNOSIS — A04.72 C. DIFFICILE COLITIS: ICD-10-CM

## 2025-01-31 DIAGNOSIS — E11.42 TYPE 2 DIABETES MELLITUS WITH DIABETIC POLYNEUROPATHY, WITH LONG-TERM CURRENT USE OF INSULIN (H): ICD-10-CM

## 2025-01-31 DIAGNOSIS — M62.81 GENERALIZED MUSCLE WEAKNESS: ICD-10-CM

## 2025-01-31 DIAGNOSIS — R13.10 DYSPHAGIA, UNSPECIFIED TYPE: ICD-10-CM

## 2025-01-31 DIAGNOSIS — J90 BILATERAL PLEURAL EFFUSION: ICD-10-CM

## 2025-01-31 DIAGNOSIS — Z87.09 HX OF PNEUMOTHORAX: ICD-10-CM

## 2025-01-31 DIAGNOSIS — Z97.8 FOLEY CATHETER IN PLACE: ICD-10-CM

## 2025-01-31 DIAGNOSIS — R53.81 PHYSICAL DECONDITIONING: Primary | ICD-10-CM

## 2025-01-31 DIAGNOSIS — F41.9 ANXIETY: ICD-10-CM

## 2025-01-31 DIAGNOSIS — Z89.511 S/P BELOW KNEE AMPUTATION, RIGHT (H): ICD-10-CM

## 2025-01-31 DIAGNOSIS — J96.01 ACUTE RESPIRATORY FAILURE WITH HYPOXIA (H): ICD-10-CM

## 2025-01-31 DIAGNOSIS — G47.00 INSOMNIA, UNSPECIFIED TYPE: ICD-10-CM

## 2025-01-31 DIAGNOSIS — G89.4 CHRONIC PAIN SYNDROME: ICD-10-CM

## 2025-01-31 DIAGNOSIS — Z79.4 TYPE 2 DIABETES MELLITUS WITH DIABETIC POLYNEUROPATHY, WITH LONG-TERM CURRENT USE OF INSULIN (H): ICD-10-CM

## 2025-01-31 DIAGNOSIS — K21.9 GASTROESOPHAGEAL REFLUX DISEASE WITHOUT ESOPHAGITIS: ICD-10-CM

## 2025-01-31 DIAGNOSIS — Z86.69 HX OF ENCEPHALOPATHY: ICD-10-CM

## 2025-01-31 DIAGNOSIS — E78.5 HYPERLIPIDEMIA LDL GOAL <100: ICD-10-CM

## 2025-01-31 DIAGNOSIS — N17.9 AKI (ACUTE KIDNEY INJURY): ICD-10-CM

## 2025-01-31 DIAGNOSIS — I48.0 PAF (PAROXYSMAL ATRIAL FIBRILLATION) (H): ICD-10-CM

## 2025-01-31 DIAGNOSIS — A08.11 NOROVIRUS: ICD-10-CM

## 2025-01-31 DIAGNOSIS — R33.9 URINARY RETENTION WITH INCOMPLETE BLADDER EMPTYING: ICD-10-CM

## 2025-01-31 DIAGNOSIS — I10 HYPERTENSION GOAL BP (BLOOD PRESSURE) < 140/90: ICD-10-CM

## 2025-01-31 DIAGNOSIS — Z87.01 HISTORY OF PNEUMONIA: ICD-10-CM

## 2025-01-31 DIAGNOSIS — I25.119 CORONARY ARTERY DISEASE INVOLVING NATIVE CORONARY ARTERY OF NATIVE HEART WITH ANGINA PECTORIS: ICD-10-CM

## 2025-01-31 DIAGNOSIS — E87.6 HYPOKALEMIA: ICD-10-CM

## 2025-01-31 DIAGNOSIS — R19.7 DIARRHEA, UNSPECIFIED TYPE: ICD-10-CM

## 2025-01-31 DIAGNOSIS — Z95.1 S/P CABG (CORONARY ARTERY BYPASS GRAFT): ICD-10-CM

## 2025-01-31 DIAGNOSIS — F33.0 MAJOR DEPRESSIVE DISORDER, RECURRENT EPISODE, MILD: ICD-10-CM

## 2025-01-31 DIAGNOSIS — F11.20 UNCOMPLICATED OPIOID DEPENDENCE (H): ICD-10-CM

## 2025-01-31 PROCEDURE — 99309 SBSQ NF CARE MODERATE MDM 30: CPT | Performed by: NURSE PRACTITIONER

## 2025-01-31 NOTE — LETTER
1/31/2025      Erwin Aguilar  [address]  [place] [address]        [facility] GERIATRICS    Chief Complaint   Patient presents with     RECHECK     HPI:  Erwin Aguilar is a 64 year old  (1960), who is being seen today for an episodic care visit at: [facility] (TC[facility])([facility]) [[id number]]. Today's concern is: The primary encounter diagnosis was Physical deconditioning. Diagnoses of Generalized muscle weakness, Coronary artery disease involving native coronary artery of native heart with angina pectoris, S/P CABG (coronary artery bypass graft), Hypertension goal BP (blood pressure) < 140/90, Hyperlipidemia LDL goal <100, At risk for prolonged QT interval syndrome, PAF (paroxysmal atrial fibrillation) (H), History of cardiac arrest, Acute respiratory failure with hypoxia (H), Hx of pneumothorax, History of pneumonia, Bilateral pleural effusion, Hx of encephalopathy, Neuropathy, Insomnia, unspecified type, Anxiety, Chronic pain syndrome, Major depressive disorder, recurrent episode, mild, Uncomplicated opioid dependence (H), AUSTIN (acute kidney injury), Hypokalemia, Urinary retention with incomplete bladder emptying, Rodriguez catheter in place, Dysphagia, unspecified type, Malnutrition, unspecified type, Diarrhea, unspecified type, Gastroesophageal reflux disease without esophagitis, Type 2 diabetes, HbA1c goal < 7% (H), Viral gastroenteritis, S/P below knee amputation, right (H), Norovirus, C. difficile colitis, and Type 2 diabetes mellitus with diabetic polyneuropathy, with long-term current use of insulin (H) were also pertinent to this visit.    Met with patient who denies any chest pain, palpitations, shortness of breath, CHAVEZ, lightheadedness, dizziness, or cough. Denies any abdominal discomfort. Denies N&V. Denies dysuria or frequency. Reports stools are bulking up and not having diarrhea anymore. BM frequency has also diminished. Appetite good. Sleeping well.  "Chronic pain stable with current regimen. He is very knowledgeable regarding his health and appears very proactive on improving it.     BP Readings from Last 3 Encounters:   01/31/25 (!) 143/66   01/29/25 124/62   01/28/25 132/69     Wt Readings from Last 5 Encounters:   01/31/25 77.1 kg (170 lb)   01/29/25 77.3 kg (170 lb 6.4 oz)   01/28/25 78 kg (172 lb)   01/28/25 77.3 kg (170 lb 6.4 oz)   01/25/25 77.2 kg (170 lb 3.1 oz)     Allergies, and PMH/PSH reviewed in EPIC today.  REVIEW OF SYSTEMS:  4 point ROS including Respiratory, CV, GI and , other than that noted in the HPI,  is negative    Objective:   BP (!) 143/66   Pulse 81   Temp 98.2  F (36.8  C)   Resp 17   Ht 1.803 m (5' 11\")   Wt 77.1 kg (170 lb)   SpO2 99%   BMI 23.71 kg/m    GENERAL APPEARANCE:  Alert, in no distress, oriented, cooperative  ENT:  Mouth and posterior oropharynx normal, moist mucous membranes, [place]  EYES:  EOM, conjunctivae, lids, pupils and irises normal  NECK:  No adenopathy,masses or thyromegaly  RESP:  respiratory effort and palpation of chest normal, lungs clear to auscultation , no respiratory distress  CV:  regular rate and rhythm, no murmur, rub, or gallop, no edema to LLE. Stump present to Right  ABDOMEN:  normal bowel sounds, soft, nontender, no hepatosplenomegaly or other masses, no guarding or rebound  M/S:   Ambulates with walker. Staff to assist  SKIN:  Inspection of skin and subcutaneous tissue baseline, wound healing well, no signs of infection healing well  NEURO:   Cranial nerves 2-12 are normal tested and grossly at patient's baseline, no purposeful movement in upper and lower extremities  PSYCH:  oriented X 3, affect and mood normal    Most Recent 3 CBC's:  Recent Labs   Lab Test 01/28/25  0315 01/25/25  0411 01/24/25  1709   WBC 9.6 6.8 7.9   HGB 11.8* 9.3* 10.3*   MCV 94 95 93    222 255     Most Recent 3 BMP's:  Recent Labs   Lab Test 01/28/25  1446 01/28/25  0947 01/28/25  0315 01/25/25  0738 " 01/25/25  0411 01/24/25  0715 01/24/25  0454   NA  --   --  137  --  142  --  139   POTASSIUM  --   --  3.5  --  3.7  --  3.3*   CHLORIDE  --   --  100  --  107  --  104   CO2  --   --  23  --  27  --  27   BUN  --   --  11.8  --  8.0  --  8.2   CR  --   --  0.58*  --  0.60*  --  0.60*   ANIONGAP  --   --  14  --  8  --  8   [place]  --   --  8.8  --  8.4*  --  9.0   * 137* 174*   < > 122*   < > 64*    < > = values in this interval not displayed.     Most Recent 2 LFT's:  Recent Labs   Lab Test 01/28/25  0315 01/04/25  0216   AST 31 472*   ALT 33 213*   ALKPHOS 268* 66   BILITOTAL 0.6 0.7     Most Recent 3 Troponin's:  Recent Labs   Lab Test 10/19/21  2150   TROPONIN <0.015     Most Recent Cholesterol Panel:  Recent Labs   Lab Test 04/23/24  1155   CHOL 143   LDL 70   HDL 60   TRIG 65     7-Day Micro Results       Collected Updated Procedure Result Status      01/28/2025 1353 01/28/2025 1846 Enteric Bacteria and Virus Panel PCR [[id number]]    (Abnormal)   Stool from Per Rectum    Final result Component Value   Campylobacter species Negative   Salmonella species Negative   Vibrio species Negative   Vibrio cholerae Negative   Yersinia enterocolitica Negative   Enteropathogenic E. coli (EPEC) Negative   Shiga-like toxin-producing E. coli (STEC) Negative   Shigella/Enteroinvasive E. coli (EIEC) Negative   Cryptosporidium species Negative   Giardia lamblia Negative   Norovirus Gl/Gll Positive   Per  notice, the Norovirus positive target may be a false positive result; please correlate with clinical findings. The virus may persist for many weeks to months.   Rotavirus A Negative   Plesiomonas shigelloides Negative   Enteroaggregative E. coli (EAEC) Negative   Enterotoxigenic E. coli (ETEC) Negative   E. coli O157 NA   Cyclospora cayetanensis Negative   Entamoeba histolytica Negative   Adenovirus F40/41 Negative   Astrovirus Negative   Sapovirus Negative            01/28/2025 1353 01/28/2025 1619 C.  difficile Toxin B PCR with reflex to C. difficile Antigen and Toxins A/B EIA [[id number]]    (Abnormal)   Stool from Per Rectum    Final result Component Value   C Difficile Toxin B by PCR Positive   Detection of C. difficile nucleic acid in stools confirms the presence of these organisms in diarrheal patients but may not indicate that C. difficile is the etiologic agent of the diarrhea. Results from the Xpert C. difficile assay should be interpreted in conjunction with other laboratory and clinical data available to the clinician.    Patients with a positive C. difficile PCR result will receive reflex GDH/Toxin Immunoassay testing. Please interpret the PCR test result in conjunction with GDH/Toxin Immunoassay results and the clinical status of patient.            01/28/2025 1353 01/28/2025 1748 C. difficile Antigen and Toxins A/B by Enzyme Immunoassay [[id number]]    (Abnormal)   Stool from Per Rectum    Final result Component Value   C. difficile GDH Antigen Positive   C. difficile Toxin Positive            01/28/2025 0401 01/28/2025 0514 Influenza A/B, RSV and SARS-CoV2 PCR (COVID-19) Nose [[id number]]    Swab from Nose    Final result Component Value   Influenza A PCR Negative   Influenza B PCR Negative   RSV PCR Negative   SARS CoV2 PCR Negative   NEGATIVE: SARS-CoV-2 (COVID-19) RNA not detected, presumed negative.            01/27/2025 0940 01/28/2025 1328 Quantiferon TB Gold Plus Grey Tube [[id number]]   Blood from Arm, Right    Final result Component Value Units   Quantiferon Nil Tube 0.00 IU/mL            01/27/2025 0940 01/28/2025 1328 Quantiferon TB Gold Plus Green Tube [[id number]]   Blood from Arm, Right    Final result Component Value Units   Quantiferon TB1 Tube 0.00 IU/mL            01/27/2025 0940 01/28/2025 1309 Quantiferon TB Gold Plus Yellow Tube [[id number]]   Blood from Arm, Right    Final result Component Value   Quantiferon TB2 Tube 0.01            01/27/2025 0940 01/28/2025 1308  Quantiferon TB Gold Plus Purple Tube [[id number]]   Blood from Arm, Right    Final result Component Value Units   Quantiferon Mitogen 10.00 IU/mL            01/27/2025 0940 01/28/2025 1442 Quantiferon TB Gold Plus [[id number]]   Blood from Arm, Right    Final result Component Value Units   Quantiferon-TB Gold Plus Negative    No interferon gamma response to M.tuberculosis antigens was detected. Infection with M.tuberculosis is unlikely, however a single negative result does not exclude infection. In patients at high risk for infection, a second test should be considered in accordance with the 2017 ATS/IDSA/CDC Clinical Pract  ice Guidelines for Diagnosis of Tuberculosis in Adults and Children    TB1 Ag minus Nil Value 0.00 IU/mL   TB2 Ag minus Nil Value 0.01 IU/mL   Mitogen minus Nil Result 10.00 IU/mL   Nil Result 0.00 IU/mL                  Most Recent TSH and T4:  Recent Labs   Lab Test 01/02/25  0333   TSH 0.48   T4 0.99     Most Recent Hemoglobin A1c:  Recent Labs   Lab Test 12/31/24  1327   A1C 5.7*     Most Recent ESR & CRP:  Recent Labs   Lab Test 01/19/25  0437 01/18/25  0506 01/02/25  0333   SED  --   --  28*   CRPI 17.30*   < > 105.00*    < > = values in this interval not displayed.     Most Recent Anemia Panel:  Recent Labs   Lab Test 01/28/25  0315   WBC 9.6   HGB 11.8*   HCT 37.1*   MCV 94          ASSESSMENT/PLAN:     (R53.81) Physical deconditioning  (primary encounter diagnosis)  (M62.81) Generalized muscle weakness  Comment: Acute on chronic. S/T below diagnosis.   Plan:   -Continue Physical therapy and Occupational therapy as directed  -SW to remain involved for safe discharge planning     (I25.119) Coronary artery disease involving native coronary artery of native heart with angina pectoris  (Z95.1) S/P CABG (coronary artery bypass graft)  (I10) Hypertension goal BP (blood pressure) < 140/90  (E78.5) Hyperlipidemia LDL goal <100  (Z91.89) At risk for prolonged QT interval  syndrome  (I48.0) PAF (paroxysmal atrial fibrillation) (H)  (Z86.74) History of cardiac arrest  (E66.01) Morbid obesity  Comment: initially presented on 12/31/24 to the [facility] ED with arm pain, vomiting, and diarrhea; found to have ACS and ST depression. He subsequently had VT arrest with 1 round of CPR, epinephrine, defibrillation. After ROSC was found to be in afib with RVR. Became hypotensive and was subsequently cannulated for [facility] ECMO. Found to have multivessel disease s/p CABG x4 on 1/2/25 with Dr. Rosenbaum. ECMO decannulated on 1/3/25 at bedside. Post operative ECHO 1/5 with LVEF 55-60%, normal RV function, no pericardial effusion. 1/17 Repeat echo in setting of therapeutic AC showed LVEF 60-65%, small, loculated pericardial effusion without HD compromise. Echo 1/21 showed EF 55-60%, large pericardial effusion without HD compromise, and RA pressure of 8 mmHg  Plan:   -Monitor BP and HR as directed  -outpatient CT and echo scheduled for 2/18/25  -Follow up with cardiology as directed. Scheduled for 2/26/25  -Continue asa 81mg daily  -Continue rosuvastatin 40mg daily  -Continue metoprolol tartrate 50mg BID. HOLD if SBP<100 and/or HR<55  -Continue losartan 25mg daily. HOLD if SBP<100  -CMP, CBC and vitamin D due 2/3/25         (J96.01) Acute respiratory failure with hypoxia (H)  (Z87.09) Hx of pneumothorax  (Z87.01) History of pneumonia  (J90) Bilateral pleural effusion  Comment:  ECMO decannulated on 1/3/25 at bedside. ICU course complicated by large R PTX seen on chest CT on 1/4/25 s/p bedside CT placement, VAP, and encephalopathy. Extubated 1/11/25.   Plan:   -Monitor respiratory status  -Monitor for worsening s/symptoms of concerns  -Encourage Incentive spirometry every 4 hours while awake  -PRN albuterol as indicated and advised  -Encourage activity OOB  -CMP, CBC and vitamin D due 2/3/25     (Z86.69) Hx of encephalopathy  (G62.9) Neuropathy  (G47.00) Insomnia, unspecified type  (F41.9) Anxiety  (G89.4) Chronic  pain syndrome  (F33.0) Major depressive disorder, recurrent episode, mild  (F11.20) Uncomplicated opioid dependence (H)  Comment: Chronic. Encephalopathy resolved PTA. Chronic opioid use noted per chart review. Declines hydroxyzine orders, however per chart review it appears this should have been discontinued in the s/o delirium. 1/1 vEEG without seizure activity (severe encephalopathy) and 12/31, 1/4, and 1/7 CTH negative for acute findings.   Plan:   -Monitor mood and behaviors  -Follow up with EMG post TCU with Dr Dixon as directed  -Monitor for changes in mobility, eating and sleeping patterns  -Continue thiamine 100mg daily  -Continue gabapentin 300mg daily at 1600 and 900mg at HS  -Continue Tylenol 650mg every 8 hours PRN  -Continue oxycodone 10mg TID PRN  -HOLD medical cannabis while on TCU  -Continue robaxin 500mg TID PRN. Recommend to discontinue if not used  -Continue PTA baclofen 10mg TID PRN as directed  -Continue PTA sertraline 200mg daily as directed  -Continue ramelteon 8mg for sleep at HS as directed  -CMP, CBC and vitamin D due 2/3/25     (N17.9) AUSTIN (acute kidney injury)  (E87.6) Hypokalemia  Comment: Acute on chronic. Improving. BL creat ~ 0.6-0.8, creatinine at baseline; adequate UOP with multiple unmeasured voids. Only Bed Weights recorded. Prosthesis now fitting, can get standing weight for baseline. Patient reports home weight around 180-182 lbs.   Plan:   -Monitor weights daily as directed. Recent weight 170# on TCU  -Continue to monitor for worsening s/symptoms of concerns  -Avoid nephrotoxins  -Continue PTA triamterene/hydrochlorothiazide 37.5/25mg as directed for now. Adjust accordingly. HOLD if SBP<100  -Monitor urinary status  -Renally dose medications as directed  -CMP, CBC and vitamin D due 2/3/25     (R33.9) Urinary retention with incomplete bladder emptying  (Z97.8) Ca catheter in place  Comment: Chronic. Per chart review, multiple attempts to remove ca catheter was in place  without success. Patient asked for ca catheter to be replaced.   Plan:   -Continue ca catheter cares as directed  -Replace catheter every 4 weeks and PRN  -Follow up with urology as directed. Scheduled for 2/17/25  -Continue flomax 0.4mg daily  -CMP, CBC and vitamin D due 2/3/25     (R13.10) Dysphagia, unspecified type  (E46) Malnutrition, unspecified type  (R19.7) Diarrhea, unspecified type  (K21.9) Gastroesophageal reflux disease without esophagitis  (K70.30) Alcoholic cirrhosis of liver without ascites  Comment: Chronic. S/T above  Plan:   -Monitor BM patterns  -Monitor weights as directed  -Monitor for worsening s/symptoms of concerns  -Senna S 1 tab BID PRN  -Continue stress ulcer prophylaxis: Pantoprazole 40mg daily x 30 days  -Dietician to remain involved on site.   -CMP, CBC and vitamin D due 2/3/25     (E11.9) Type 2 diabetes, HbA1c goal < 7% (H)  Comment: Chronic. Recent A1c~ 5.7%. Initially managed on insulin drip postop, transitioned to sliding scale & Lantus   Plan:   -Monitor Blood Glucose TID as directed  -Continue insulin lantus 7units at HS. HOLD if Blood Glucose<120 (PTa dose was 26 units at HS)  -CMP, CBC and vitamin D due 2/3/25     (S21.109A) Wound of sternal region  (L30.9) Dermatitis  (Z86.2) Hx of leukocytosis  Comment: Acute on chronic. Recent history of sternal incision dermatitis-Skin glue remained in place and appeared to be irritating skin. Skin glue removed with adhesive remover. Patient reported that PRN atarax was helpful with itching, can continue with PRN atarax, however patient did not approve of its use on TCU, therefore this medication was discontinued. - 1/10 C. Diff neg. 1/14 infectious work-up for worsening leukocytosis negative thus far; follow for final results. 1/14 BLE US neg for DVT. 1/15 2-view CXR with low lung volumes, small bilateral pleural effusions but too small for drainage. Defer respiratory viral panel given stable on RA, afebrile. Klebsiella pneumoniae  from sputum cultures on 12/31 and 1/9. Completed course of Zosyn from 1/9-1/20. Zosyn and vancomycin were restarted 1/9 for fever. Sputum culture again grew Klebsiella pneumoniae. 1/11 noted to have small puss drainage from sternotomy. CT chest on 1/12/25, not concerning for deep infection. CT CAP 1/17 to evaluate abd pain, continued unexplained leukocytosis. Demonstrated large substernal fluid collection. Discontinue Vanc & Zosyn per Dr. Rosenbaum, and monitor off abx. Off all antibiotics since 1/20.  WBC remain normal. IR on Thursday 1/23/2025 per proceduralist:  Image guided access into retrosternal collection via a subxiphoid approach. Aspiration revealed scant serosanguinous return. No drain placed. Impression is consistent with hematoma. Patient declines CT scan to assess for residual collection. Patient refused repeat CT with contrast on 1/25/25.   Plan:   -Monitor for worsening s/symptoms of concerns  -Continue wound care as directed: Wound Care Right Groin: Wet to dry Nugauze strip light packing.  -Continue stress ulcer prophylaxis: Pantoprazole 40mg daily x 30 days  -DVT prophylaxis, HELD AC with substernal hematoma.   -Encourage activity OOB  -Continue sternal precautions  -Plan to follow up ECHO and CT chest with contrast prior to follow up appointment as indicated above  -CMP, CBC and vitamin D due 2/3/25     (D62) Anemia due to blood loss, acute  (D75.839) Thrombocytosis  (Z86.2) Hx of thrombocytopenia  Comment: Acut mika chronic. Likely reactive. Hgb stable; Plt WNL, no signs or symptoms of active bleeding   Plan:   - PTA apixaban had been resumed for hx of PAF now s/p MAZE and LAAC with plan to defer long-term AC plan to OP Cardiology. Apixaban held on 1/20/25 with concern for growing substernal fluid collection now thought to be hematoma. Will plan to hold at discharge per Dr. Rosenbaum.   -Monitor for bleeding risks  -Continue stress ulcer prophylaxis: Pantoprazole 40mg daily x 30 days  -Monitor for worsening  s/symptoms of concerns  -CMP, CBC and vitamin D due 2/3/25     (Z89.511) History of below-knee amputation of right lower extremity (H)  Comment: Chronic. Noted per chart review.      (A08.4) Viral gastroenteritis  (A04.72) C difficle colitis  (A08.11) Norovirus  Comment: Acute. Went out to ED on 1/28 with several hours of vomiting, diarrhea, and abdominal pain. Per ED records: Erwin's diarrhea and vomiting as well as abdominal pain is concerning for enteritis, c.diff, pancreatitis, cholecystitis, and the flu. Work up revealed CBC consistent with baseline, CMP mostly consistent with baseline but with alkaline phosphatase 268. Low Mg which we replaced. Lipase mildly elevated to 86. Negative for flu/rsv/covid, UA with large amount of blood but otherwise consistent with previous UA no sign of infection. Given patient's abdominal symptoms and elevated alk phos along with mildly elevated lipase wanted to obtain CT CAP. However, patient did not want to do that at this time. Compromised and did an abdominal ultrasound. Ultrasound showed tiny stone in the gallbladder without gallbladder inflammation, partial view of the pancreas with the portion of the pancreas within view appearing normal. Patient most likely has some form of viral enteritis given symptom profile, lab values, and vital signs. However, unable to rule out pancreatitis at this time given no CT and poorly visualized pancrease on ultrasound. Stool testing back positive with C diff  Plan:  -Continue vancomycin 125mg QID x 10 days  -Continue entneric precautions as directed  -Monitor BM patterns  -Continue zofran PRN  -TUMS PRN  -Continue stress ulcer prophylaxis: Pantoprazole 40mg daily x 30 days  -Monitor for worsening s/symptoms of concerns  -CMP, CBC and vitamin D due 2/3/25    Electronically signed by:  Dr. Allison Conley DNP, APRN, FNP-C, WCS-C, EDS-C     Provider reviewed records from facility, and interpreted most recent imaging/lab work, and vital signs.    Acute and chronic conditions managed by writer. Have been reviewed during today's exam.              Sincerely,        VANCE Wen CNP    Electronically signed

## 2025-01-31 NOTE — PROGRESS NOTES
[facility] GERIATRICS    Chief Complaint   Patient presents with    RECHECK     HPI:  Erwin Aguilar is a 64 year old  (1960), who is being seen today for an episodic care visit at: [facility])([facility]) [[id number]]. Today's concern is: The primary encounter diagnosis was Physical deconditioning. Diagnoses of Generalized muscle weakness, Coronary artery disease involving native coronary artery of native heart with angina pectoris, S/P CABG (coronary artery bypass graft), Hypertension goal BP (blood pressure) < 140/90, Hyperlipidemia LDL goal <100, At risk for prolonged QT interval syndrome, PAF (paroxysmal atrial fibrillation) (H), History of cardiac arrest, Acute respiratory failure with hypoxia (H), Hx of pneumothorax, History of pneumonia, Bilateral pleural effusion, Hx of encephalopathy, Neuropathy, Insomnia, unspecified type, Anxiety, Chronic pain syndrome, Major depressive disorder, recurrent episode, mild, Uncomplicated opioid dependence (H), AUSTIN (acute kidney injury), Hypokalemia, Urinary retention with incomplete bladder emptying, Rodriguez catheter in place, Dysphagia, unspecified type, Malnutrition, unspecified type, Diarrhea, unspecified type, Gastroesophageal reflux disease without esophagitis, Type 2 diabetes, HbA1c goal < 7% (H), Viral gastroenteritis, S/P below knee amputation, right (H), Norovirus, C. difficile colitis, and Type 2 diabetes mellitus with diabetic polyneuropathy, with long-term current use of insulin (H) were also pertinent to this visit.    Met with patient who denies any chest pain, palpitations, shortness of breath, CHAVEZ, lightheadedness, dizziness, or cough. Denies any abdominal discomfort. Denies N&V. Denies dysuria or frequency. Reports stools are bulking up and not having diarrhea anymore. BM frequency has also diminished. Appetite good. Sleeping well. Chronic pain stable with current regimen. He is very knowledgeable regarding his health  "and appears very proactive on improving it.     BP Readings from Last 3 Encounters:   01/31/25 (!) 143/66   01/29/25 124/62   01/28/25 132/69     Wt Readings from Last 5 Encounters:   01/31/25 77.1 kg (170 lb)   01/29/25 77.3 kg (170 lb 6.4 oz)   01/28/25 78 kg (172 lb)   01/28/25 77.3 kg (170 lb 6.4 oz)   01/25/25 77.2 kg (170 lb 3.1 oz)     Allergies, and PMH/PSH reviewed in EPIC today.  REVIEW OF SYSTEMS:  4 point ROS including Respiratory, CV, GI and , other than that noted in the HPI,  is negative    Objective:   BP (!) 143/66   Pulse 81   Temp 98.2  F (36.8  C)   Resp 17   Ht 1.803 m (5' 11\")   Wt 77.1 kg (170 lb)   SpO2 99%   BMI 23.71 kg/m    GENERAL APPEARANCE:  Alert, in no distress, oriented, cooperative  ENT:  Mouth and posterior oropharynx normal, moist mucous membranes, [place]  EYES:  EOM, conjunctivae, lids, pupils and irises normal  NECK:  No adenopathy,masses or thyromegaly  RESP:  respiratory effort and palpation of chest normal, lungs clear to auscultation , no respiratory distress  CV:  regular rate and rhythm, no murmur, rub, or gallop, no edema to LLE. Stump present to Right  ABDOMEN:  normal bowel sounds, soft, nontender, no hepatosplenomegaly or other masses, no guarding or rebound  M/S:   Ambulates with walker. Staff to assist  SKIN:  Inspection of skin and subcutaneous tissue baseline, wound healing well, no signs of infection healing well  NEURO:   Cranial nerves 2-12 are normal tested and grossly at patient's baseline, no purposeful movement in upper and lower extremities  PSYCH:  oriented X 3, affect and mood normal    Most Recent 3 CBC's:  Recent Labs   Lab Test 01/28/25  0315 01/25/25  0411 01/24/25  1709   WBC 9.6 6.8 7.9   HGB 11.8* 9.3* 10.3*   MCV 94 95 93    222 255     Most Recent 3 BMP's:  Recent Labs   Lab Test 01/28/25  1446 01/28/25  0947 01/28/25  0315 01/25/25  0738 01/25/25  0411 01/24/25  0715 01/24/25  0454   NA  --   --  137  --  142  --  139   POTASSIUM  " --   --  3.5  --  3.7  --  3.3*   CHLORIDE  --   --  100  --  107  --  104   CO2  --   --  23  --  27  --  27   BUN  --   --  11.8  --  8.0  --  8.2   CR  --   --  0.58*  --  0.60*  --  0.60*   ANIONGAP  --   --  14  --  8  --  8   [place]  --   --  8.8  --  8.4*  --  9.0   * 137* 174*   < > 122*   < > 64*    < > = values in this interval not displayed.     Most Recent 2 LFT's:  Recent Labs   Lab Test 01/28/25  0315 01/04/25  0216   AST 31 472*   ALT 33 213*   ALKPHOS 268* 66   BILITOTAL 0.6 0.7     Most Recent 3 Troponin's:  Recent Labs   Lab Test 10/19/21  2150   TROPONIN <0.015     Most Recent Cholesterol Panel:  Recent Labs   Lab Test 04/23/24  1155   CHOL 143   LDL 70   HDL 60   TRIG 65     7-Day Micro Results       Collected Updated Procedure Result Status      01/28/2025 1353 01/28/2025 1846 Enteric Bacteria and Virus Panel PCR [[id number]]    (Abnormal)   Stool from Per Rectum    Final result Component Value   Campylobacter species Negative   Salmonella species Negative   Vibrio species Negative   Vibrio cholerae Negative   Yersinia enterocolitica Negative   Enteropathogenic E. coli (EPEC) Negative   Shiga-like toxin-producing E. coli (STEC) Negative   Shigella/Enteroinvasive E. coli (EIEC) Negative   Cryptosporidium species Negative   Giardia lamblia Negative   Norovirus Gl/Gll Positive   Per  notice, the Norovirus positive target may be a false positive result; please correlate with clinical findings. The virus may persist for many weeks to months.   Rotavirus A Negative   Plesiomonas shigelloides Negative   Enteroaggregative E. coli (EAEC) Negative   Enterotoxigenic E. coli (ETEC) Negative   E. coli O157 NA   Cyclospora cayetanensis Negative   Entamoeba histolytica Negative   Adenovirus F40/41 Negative   Astrovirus Negative   Sapovirus Negative            01/28/2025 1353 01/28/2025 1619 C. difficile Toxin B PCR with reflex to C. difficile Antigen and Toxins A/B EIA [[id number]]     (Abnormal)   Stool from Per Rectum    Final result Component Value   C Difficile Toxin B by PCR Positive   Detection of C. difficile nucleic acid in stools confirms the presence of these organisms in diarrheal patients but may not indicate that C. difficile is the etiologic agent of the diarrhea. Results from the Xpert C. difficile assay should be interpreted in conjunction with other laboratory and clinical data available to the clinician.    Patients with a positive C. difficile PCR result will receive reflex GDH/Toxin Immunoassay testing. Please interpret the PCR test result in conjunction with GDH/Toxin Immunoassay results and the clinical status of patient.            01/28/2025 1353 01/28/2025 1748 C. difficile Antigen and Toxins A/B by Enzyme Immunoassay [[id number]]    (Abnormal)   Stool from Per Rectum    Final result Component Value   C. difficile GDH Antigen Positive   C. difficile Toxin Positive            01/28/2025 0401 01/28/2025 0514 Influenza A/B, RSV and SARS-CoV2 PCR (COVID-19) Nose [[id number]]    Swab from Nose    Final result Component Value   Influenza A PCR Negative   Influenza B PCR Negative   RSV PCR Negative   SARS CoV2 PCR Negative   NEGATIVE: SARS-CoV-2 (COVID-19) RNA not detected, presumed negative.            01/27/2025 0940 01/28/2025 1328 Quantiferon TB Gold Plus Grey Tube [[id number]]   Blood from Arm, Right    Final result Component Value Units   Quantiferon Nil Tube 0.00 IU/mL            01/27/2025 0940 01/28/2025 1328 Quantiferon TB Gold Plus Green Tube [[id number]]   Blood from Arm, Right    Final result Component Value Units   Quantiferon TB1 Tube 0.00 IU/mL            01/27/2025 0940 01/28/2025 1309 Quantiferon TB Gold Plus Yellow Tube [[id number]]   Blood from Arm, Right    Final result Component Value   Quantiferon TB2 Tube 0.01            01/27/2025 0940 01/28/2025 1308 Quantiferon TB Gold Plus Purple Tube [[id number]]   Blood from Arm, Right    Final result  Component Value Units   Quantiferon Mitogen 10.00 IU/mL            01/27/2025 0940 01/28/2025 1442 Quantiferon TB Gold Plus [[id number]]   Blood from Arm, Right    Final result Component Value Units   Quantiferon-TB Gold Plus Negative    No interferon gamma response to M.tuberculosis antigens was detected. Infection with M.tuberculosis is unlikely, however a single negative result does not exclude infection. In patients at high risk for infection, a second test should be considered in accordance with the 2017 ATS/IDSA/CDC Clinical Pract  ice Guidelines for Diagnosis of Tuberculosis in Adults and Children    TB1 Ag minus Nil Value 0.00 IU/mL   TB2 Ag minus Nil Value 0.01 IU/mL   Mitogen minus Nil Result 10.00 IU/mL   Nil Result 0.00 IU/mL                  Most Recent TSH and T4:  Recent Labs   Lab Test 01/02/25  0333   TSH 0.48   T4 0.99     Most Recent Hemoglobin A1c:  Recent Labs   Lab Test 12/31/24  1327   A1C 5.7*     Most Recent ESR & CRP:  Recent Labs   Lab Test 01/19/25  0437 01/18/25  0506 01/02/25  0333   SED  --   --  28*   CRPI 17.30*   < > 105.00*    < > = values in this interval not displayed.     Most Recent Anemia Panel:  Recent Labs   Lab Test 01/28/25  0315   WBC 9.6   HGB 11.8*   HCT 37.1*   MCV 94          ASSESSMENT/PLAN:     (R53.81) Physical deconditioning  (primary encounter diagnosis)  (M62.81) Generalized muscle weakness  Comment: Acute on chronic. S/T below diagnosis.   Plan:   -Continue Physical therapy and Occupational therapy as directed  -SW to remain involved for safe discharge planning     (I25.119) Coronary artery disease involving native coronary artery of native heart with angina pectoris  (Z95.1) S/P CABG (coronary artery bypass graft)  (I10) Hypertension goal BP (blood pressure) < 140/90  (E78.5) Hyperlipidemia LDL goal <100  (Z91.89) At risk for prolonged QT interval syndrome  (I48.0) PAF (paroxysmal atrial fibrillation) (H)  (Z86.74) History of cardiac arrest  (E66.01)  Morbid obesity  Comment: initially presented on 12/31/24 to the [facility] ED with arm pain, vomiting, and diarrhea; found to have ACS and ST depression. He subsequently had VT arrest with 1 round of CPR, epinephrine, defibrillation. After ROSC was found to be in afib with RVR. Became hypotensive and was subsequently cannulated for [facility] ECMO. Found to have multivessel disease s/p CABG x4 on 1/2/25 with Dr. Rosenbaum. ECMO decannulated on 1/3/25 at bedside. Post operative ECHO 1/5 with LVEF 55-60%, normal RV function, no pericardial effusion. 1/17 Repeat echo in setting of therapeutic AC showed LVEF 60-65%, small, loculated pericardial effusion without HD compromise. Echo 1/21 showed EF 55-60%, large pericardial effusion without HD compromise, and RA pressure of 8 mmHg  Plan:   -Monitor BP and HR as directed  -outpatient CT and echo scheduled for 2/18/25  -Follow up with cardiology as directed. Scheduled for 2/26/25  -Continue asa 81mg daily  -Continue rosuvastatin 40mg daily  -Continue metoprolol tartrate 50mg BID. HOLD if SBP<100 and/or HR<55  -Continue losartan 25mg daily. HOLD if SBP<100  -CMP, CBC and vitamin D due 2/3/25         (J96.01) Acute respiratory failure with hypoxia (H)  (Z87.09) Hx of pneumothorax  (Z87.01) History of pneumonia  (J90) Bilateral pleural effusion  Comment:  ECMO decannulated on 1/3/25 at bedside. ICU course complicated by large R PTX seen on chest CT on 1/4/25 s/p bedside CT placement, VAP, and encephalopathy. Extubated 1/11/25.   Plan:   -Monitor respiratory status  -Monitor for worsening s/symptoms of concerns  -Encourage Incentive spirometry every 4 hours while awake  -PRN albuterol as indicated and advised  -Encourage activity OOB  -CMP, CBC and vitamin D due 2/3/25     (Z86.69) Hx of encephalopathy  (G62.9) Neuropathy  (G47.00) Insomnia, unspecified type  (F41.9) Anxiety  (G89.4) Chronic pain syndrome  (F33.0) Major depressive disorder, recurrent episode, mild  (F11.20) Uncomplicated opioid  dependence (H)  Comment: Chronic. Encephalopathy resolved PTA. Chronic opioid use noted per chart review. Declines hydroxyzine orders, however per chart review it appears this should have been discontinued in the s/o delirium. 1/1 vEEG without seizure activity (severe encephalopathy) and 12/31, 1/4, and 1/7 CTH negative for acute findings.   Plan:   -Monitor mood and behaviors  -Follow up with EMG post TCU with Dr Dixon as directed  -Monitor for changes in mobility, eating and sleeping patterns  -Continue thiamine 100mg daily  -Continue gabapentin 300mg daily at 1600 and 900mg at HS  -Continue Tylenol 650mg every 8 hours PRN  -Continue oxycodone 10mg TID PRN  -HOLD medical cannabis while on TCU  -Continue robaxin 500mg TID PRN. Recommend to discontinue if not used  -Continue PTA baclofen 10mg TID PRN as directed  -Continue PTA sertraline 200mg daily as directed  -Continue ramelteon 8mg for sleep at HS as directed  -CMP, CBC and vitamin D due 2/3/25     (N17.9) AUSTIN (acute kidney injury)  (E87.6) Hypokalemia  Comment: Acute on chronic. Improving. BL creat ~ 0.6-0.8, creatinine at baseline; adequate UOP with multiple unmeasured voids. Only Bed Weights recorded. Prosthesis now fitting, can get standing weight for baseline. Patient reports home weight around 180-182 lbs.   Plan:   -Monitor weights daily as directed. Recent weight 170# on TCU  -Continue to monitor for worsening s/symptoms of concerns  -Avoid nephrotoxins  -Continue PTA triamterene/hydrochlorothiazide 37.5/25mg as directed for now. Adjust accordingly. HOLD if SBP<100  -Monitor urinary status  -Renally dose medications as directed  -CMP, CBC and vitamin D due 2/3/25     (R33.9) Urinary retention with incomplete bladder emptying  (Z97.8) Ca catheter in place  Comment: Chronic. Per chart review, multiple attempts to remove ca catheter was in place without success. Patient asked for ca catheter to be replaced.   Plan:   -Continue ca catheter cares as  directed  -Replace catheter every 4 weeks and PRN  -Follow up with urology as directed. Scheduled for 2/17/25  -Continue flomax 0.4mg daily  -CMP, CBC and vitamin D due 2/3/25     (R13.10) Dysphagia, unspecified type  (E46) Malnutrition, unspecified type  (R19.7) Diarrhea, unspecified type  (K21.9) Gastroesophageal reflux disease without esophagitis  (K70.30) Alcoholic cirrhosis of liver without ascites  Comment: Chronic. S/T above  Plan:   -Monitor BM patterns  -Monitor weights as directed  -Monitor for worsening s/symptoms of concerns  -Senna S 1 tab BID PRN  -Continue stress ulcer prophylaxis: Pantoprazole 40mg daily x 30 days  -Dietician to remain involved on site.   -CMP, CBC and vitamin D due 2/3/25     (E11.9) Type 2 diabetes, HbA1c goal < 7% (H)  Comment: Chronic. Recent A1c~ 5.7%. Initially managed on insulin drip postop, transitioned to sliding scale & Lantus   Plan:   -Monitor Blood Glucose TID as directed  -Continue insulin lantus 7units at HS. HOLD if Blood Glucose<120 (PTa dose was 26 units at HS)  -CMP, CBC and vitamin D due 2/3/25     (S21.109A) Wound of sternal region  (L30.9) Dermatitis  (Z86.2) Hx of leukocytosis  Comment: Acute on chronic. Recent history of sternal incision dermatitis-Skin glue remained in place and appeared to be irritating skin. Skin glue removed with adhesive remover. Patient reported that PRN atarax was helpful with itching, can continue with PRN atarax, however patient did not approve of its use on TCU, therefore this medication was discontinued. - 1/10 C. Diff neg. 1/14 infectious work-up for worsening leukocytosis negative thus far; follow for final results. 1/14 BLE US neg for DVT. 1/15 2-view CXR with low lung volumes, small bilateral pleural effusions but too small for drainage. Defer respiratory viral panel given stable on RA, afebrile. Klebsiella pneumoniae from sputum cultures on 12/31 and 1/9. Completed course of Zosyn from 1/9-1/20. Zosyn and vancomycin were  restarted 1/9 for fever. Sputum culture again grew Klebsiella pneumoniae. 1/11 noted to have small puss drainage from sternotomy. CT chest on 1/12/25, not concerning for deep infection. CT CAP 1/17 to evaluate abd pain, continued unexplained leukocytosis. Demonstrated large substernal fluid collection. Discontinue Vanc & Zosyn per Dr. Rosenbaum, and monitor off abx. Off all antibiotics since 1/20.  WBC remain normal. IR on Thursday 1/23/2025 per proceduralist:  Image guided access into retrosternal collection via a subxiphoid approach. Aspiration revealed scant serosanguinous return. No drain placed. Impression is consistent with hematoma. Patient declines CT scan to assess for residual collection. Patient refused repeat CT with contrast on 1/25/25.   Plan:   -Monitor for worsening s/symptoms of concerns  -Continue wound care as directed: Wound Care Right Groin: Wet to dry Nugauze strip light packing.  -Continue stress ulcer prophylaxis: Pantoprazole 40mg daily x 30 days  -DVT prophylaxis, HELD AC with substernal hematoma.   -Encourage activity OOB  -Continue sternal precautions  -Plan to follow up ECHO and CT chest with contrast prior to follow up appointment as indicated above  -CMP, CBC and vitamin D due 2/3/25     (D62) Anemia due to blood loss, acute  (D75.839) Thrombocytosis  (Z86.2) Hx of thrombocytopenia  Comment: Acut mika chronic. Likely reactive. Hgb stable; Plt WNL, no signs or symptoms of active bleeding   Plan:   - PTA apixaban had been resumed for hx of PAF now s/p MAZE and LAAC with plan to defer long-term AC plan to OP Cardiology. Apixaban held on 1/20/25 with concern for growing substernal fluid collection now thought to be hematoma. Will plan to hold at discharge per Dr. Rosenbaum.   -Monitor for bleeding risks  -Continue stress ulcer prophylaxis: Pantoprazole 40mg daily x 30 days  -Monitor for worsening s/symptoms of concerns  -CMP, CBC and vitamin D due 2/3/25     (Z89.511) History of below-knee amputation  of right lower extremity (H)  Comment: Chronic. Noted per chart review.      (A08.4) Viral gastroenteritis  (A04.72) C difficle colitis  (A08.11) Norovirus  Comment: Acute. Went out to ED on 1/28 with several hours of vomiting, diarrhea, and abdominal pain. Per ED records: Erwin's diarrhea and vomiting as well as abdominal pain is concerning for enteritis, c.diff, pancreatitis, cholecystitis, and the flu. Work up revealed CBC consistent with baseline, CMP mostly consistent with baseline but with alkaline phosphatase 268. Low Mg which we replaced. Lipase mildly elevated to 86. Negative for flu/rsv/covid, UA with large amount of blood but otherwise consistent with previous UA no sign of infection. Given patient's abdominal symptoms and elevated alk phos along with mildly elevated lipase wanted to obtain CT CAP. However, patient did not want to do that at this time. Compromised and did an abdominal ultrasound. Ultrasound showed tiny stone in the gallbladder without gallbladder inflammation, partial view of the pancreas with the portion of the pancreas within view appearing normal. Patient most likely has some form of viral enteritis given symptom profile, lab values, and vital signs. However, unable to rule out pancreatitis at this time given no CT and poorly visualized pancrease on ultrasound. Stool testing back positive with C diff  Plan:  -Continue vancomycin 125mg QID x 10 days  -Continue entneric precautions as directed  -Monitor BM patterns  -Continue zofran PRN  -TUMS PRN  -Continue stress ulcer prophylaxis: Pantoprazole 40mg daily x 30 days  -Monitor for worsening s/symptoms of concerns  -CMP, CBC and vitamin D due 2/3/25    Electronically signed by:  Dr. Allison Conley DNP, APRN, FNP-C, WCS-C, EDS-C     Provider reviewed records from facility, and interpreted most recent imaging/lab work, and vital signs.   Acute and chronic conditions managed by writer. Have been reviewed during today's exam.

## 2025-02-02 ENCOUNTER — LAB REQUISITION (OUTPATIENT)
Dept: LAB | Facility: CLINIC | Age: 65
End: 2025-02-02
Payer: MEDICARE

## 2025-02-02 DIAGNOSIS — I15.2 HYPERTENSION SECONDARY TO ENDOCRINE DISORDERS: ICD-10-CM

## 2025-02-02 DIAGNOSIS — I25.10 ATHEROSCLEROTIC HEART DISEASE OF NATIVE CORONARY ARTERY WITHOUT ANGINA PECTORIS: ICD-10-CM

## 2025-02-02 DIAGNOSIS — N18.30 CHRONIC KIDNEY DISEASE, STAGE 3 UNSPECIFIED (H): ICD-10-CM

## 2025-02-02 DIAGNOSIS — A04.72 ENTEROCOLITIS DUE TO CLOSTRIDIUM DIFFICILE, NOT SPECIFIED AS RECURRENT: ICD-10-CM

## 2025-02-02 DIAGNOSIS — D64.9 ANEMIA, UNSPECIFIED: ICD-10-CM

## 2025-02-02 NOTE — PROGRESS NOTES
[facility] GERIATRICS    Chief Complaint   Patient presents with    RECHECK     HPI:  Erwin Aguilar is a 64 year old  (1960), who is being seen today for an episodic care visit at: [facility]) [[id number]]. Today's concern is: The primary encounter diagnosis was Physical deconditioning. Diagnoses of Generalized muscle weakness, Coronary artery disease involving native coronary artery of native heart with angina pectoris, Hypertension goal BP (blood pressure) < 140/90, S/P CABG (coronary artery bypass graft), Hyperlipidemia LDL goal <100, At risk for prolonged QT interval syndrome, PAF (paroxysmal atrial fibrillation) (H), History of cardiac arrest, Acute respiratory failure with hypoxia (H), Hx of pneumothorax, History of pneumonia, Bilateral pleural effusion, Hx of encephalopathy, Neuropathy, Insomnia, unspecified type, Anxiety, Chronic pain syndrome, Major depressive disorder, recurrent episode, mild, Uncomplicated opioid dependence (H), AUSTIN (acute kidney injury), Hypokalemia, Urinary retention with incomplete bladder emptying, Rodriguez catheter in place, Dysphagia, unspecified type, Malnutrition, unspecified type, Diarrhea, unspecified type, Gastroesophageal reflux disease without esophagitis, Type 2 diabetes, HbA1c goal < 7% (H), Viral gastroenteritis, S/P below knee amputation, right (H), Norovirus, and C. difficile colitis were also pertinent to this visit.    Met with patient who denies any chest pain, palpitations, shortness of breath, CHAVEZ, lightheadedness, dizziness, or cough. Denies any abdominal discomfort. Denies N&V. Denies B&B concerns. Denies dysuria or frequency. Denies loose or constipation. Improvement of c diff and norovirus symptoms. Appetite good. Sleeping well. Denies any pain complaints    BP Readings from Last 3 Encounters:   02/03/25 123/80   01/31/25 (!) 143/66   01/29/25 124/62     Wt Readings from Last 5 Encounters:   02/03/25 77.1 kg (170  "lb)   01/31/25 77.1 kg (170 lb)   01/29/25 77.3 kg (170 lb 6.4 oz)   01/28/25 78 kg (172 lb)   01/28/25 77.3 kg (170 lb 6.4 oz)     Allergies, and PMH/PSH reviewed in EPIC today.  REVIEW OF SYSTEMS:  4 point ROS including Respiratory, CV, GI and , other than that noted in the HPI,  is negative    Objective:   /80   Pulse 73   Temp 98  F (36.7  C)   Resp 18   Ht 1.803 m (5' 11\")   Wt 77.1 kg (170 lb)   SpO2 97%   BMI 23.71 kg/m    GENERAL APPEARANCE:  Alert, in no distress, cooperative  ENT:  Mouth and posterior oropharynx normal, moist mucous membranes, normal hearing acuity  EYES:  EOM, conjunctivae, lids, pupils and irises normal  NECK:  No adenopathy,masses or thyromegaly  RESP:  respiratory effort and palpation of chest normal, lungs clear to auscultation , no respiratory distress  CV:  regular rate and rhythm, no murmur, rub, or gallop  ABDOMEN:  normal bowel sounds, soft, nontender, no hepatosplenomegaly or other masses, no guarding or rebound  M/S:   ambulates with walker.   SKIN:  Inspection of skin and subcutaneous tissue baseline, Palpation of skin and subcutaneous tissue baseline  NEURO:   Cranial nerves 2-12 are normal tested and grossly at patient's baseline, no purposeful movement in upper and lower extremities  PSYCH:  oriented X 3, affect and mood normal    Most Recent 3 CBC's:  Recent Labs   Lab Test 01/28/25  0315 01/25/25  0411 01/24/25  1709   WBC 9.6 6.8 7.9   HGB 11.8* 9.3* 10.3*   MCV 94 95 93    222 255     Most Recent 3 BMP's:  Recent Labs   Lab Test 01/28/25  1446 01/28/25  0947 01/28/25  0315 01/25/25  0738 01/25/25  0411 01/24/25  0715 01/24/25  0454   NA  --   --  137  --  142  --  139   POTASSIUM  --   --  3.5  --  3.7  --  3.3*   CHLORIDE  --   --  100  --  107  --  104   CO2  --   --  23  --  27  --  27   BUN  --   --  11.8  --  8.0  --  8.2   CR  --   --  0.58*  --  0.60*  --  0.60*   ANIONGAP  --   --  14  --  8  --  8   [place]  --   --  8.8  --  8.4*  --  9.0 "   * 137* 174*   < > 122*   < > 64*    < > = values in this interval not displayed.     Most Recent 2 LFT's:  Recent Labs   Lab Test 01/28/25 0315 01/04/25  0216   AST 31 472*   ALT 33 213*   ALKPHOS 268* 66   BILITOTAL 0.6 0.7     Most Recent Anemia Panel:  Recent Labs   Lab Test 01/28/25 0315   WBC 9.6   HGB 11.8*   HCT 37.1*   MCV 94        ASSESSMENT/PLAN:     (R53.81) Physical deconditioning  (primary encounter diagnosis)  (M62.81) Generalized muscle weakness  Comment: Acute on chronic. S/T below diagnosis.   Plan:   -Continue Physical therapy and Occupational therapy as directed  -SW to remain involved for safe discharge planning     (I25.119) Coronary artery disease involving native coronary artery of native heart with angina pectoris  (Z95.1) S/P CABG (coronary artery bypass graft)  (I10) Hypertension goal BP (blood pressure) < 140/90  (E78.5) Hyperlipidemia LDL goal <100  (Z91.89) At risk for prolonged QT interval syndrome  (I48.0) PAF (paroxysmal atrial fibrillation) (H)  (Z86.74) History of cardiac arrest  (E66.01) Morbid obesity  Comment: initially presented on 12/31/24 to the [facility] ED with arm pain, vomiting, and diarrhea; found to have ACS and ST depression. He subsequently had VT arrest with 1 round of CPR, epinephrine, defibrillation. After ROSC was found to be in afib with RVR. Became hypotensive and was subsequently cannulated for [facility] ECMO. Found to have multivessel disease s/p CABG x4 on 1/2/25 with Dr. Rosenbaum. ECMO decannulated on 1/3/25 at bedside. Post operative ECHO 1/5 with LVEF 55-60%, normal RV function, no pericardial effusion. 1/17 Repeat echo in setting of therapeutic AC showed LVEF 60-65%, small, loculated pericardial effusion without HD compromise. Echo 1/21 showed EF 55-60%, large pericardial effusion without HD compromise, and RA pressure of 8 mmHg  Plan:   -Monitor BP and HR as directed  -outpatient CT and echo scheduled for 2/18/25  -Follow up with cardiology as  directed. Scheduled for 2/26/25  -Continue asa 81mg daily  -Continue rosuvastatin 40mg daily  -Continue metoprolol tartrate 50mg BID. HOLD if SBP<100 and/or HR<55  -Continue losartan 25mg daily. HOLD if SBP<100  -CMP, CBC and vitamin D originally due 2/3/25, however lab error noted, therefore rescheduled for tomorrow 2/4/25  -Trend labs with PCP post TCU     (J96.01) Acute respiratory failure with hypoxia (H)  (Z87.09) Hx of pneumothorax  (Z87.01) History of pneumonia  (J90) Bilateral pleural effusion  Comment:  ECMO decannulated on 1/3/25 at bedside. ICU course complicated by large R PTX seen on chest CT on 1/4/25 s/p bedside CT placement, VAP, and encephalopathy. Extubated 1/11/25.   Plan:   -Monitor respiratory status  -Monitor for worsening s/symptoms of concerns  -Encourage Incentive spirometry every 4 hours while awake  -PRN albuterol as indicated and advised  -Encourage activity OOB  -CMP, CBC and vitamin D originally due 2/3/25, however lab error noted, therefore rescheduled for tomorrow 2/4/25  -Trend labs with PCP post TCU     (Z86.69) Hx of encephalopathy  (G62.9) Neuropathy  (G47.00) Insomnia, unspecified type  (F41.9) Anxiety  (G89.4) Chronic pain syndrome  (F33.0) Major depressive disorder, recurrent episode, mild  (F11.20) Uncomplicated opioid dependence (H)  Comment: Chronic. Encephalopathy resolved PTA. Chronic opioid use noted per chart review. Declines hydroxyzine orders, however per chart review it appears this should have been discontinued in the s/o delirium. 1/1 vEEG without seizure activity (severe encephalopathy) and 12/31, 1/4, and 1/7 CTH negative for acute findings.   Plan:   -Monitor mood and behaviors  -Follow up with EMG post TCU with Dr Dixon as directed  -Monitor for changes in mobility, eating and sleeping patterns  -Continue thiamine 100mg daily  -Continue gabapentin 300mg daily at 1600 and 900mg at HS  -Continue Tylenol 650mg every 8 hours PRN  -Continue oxycodone 10mg TID PRN  -HOLD  medical cannabis while on TCU  -Continue robaxin 500mg TID PRN. Recommend to discontinue if not used  -Continue PTA baclofen 10mg TID PRN as directed  -Continue PTA sertraline 200mg daily as directed  -Continue ramelteon 8mg for sleep at HS as directed  -CMP, CBC and vitamin D originally due 2/3/25, however lab error noted, therefore rescheduled for tomorrow 2/4/25  -Trend labs with PCP post TCU     (N17.9) AUSTIN (acute kidney injury)  (E87.6) Hypokalemia  Comment: Acute on chronic. Improving. BL creat ~ 0.6-0.8, creatinine at baseline; adequate UOP with multiple unmeasured voids. Only Bed Weights recorded. Prosthesis now fitting, can get standing weight for baseline. Patient reports home weight around 180-182 lbs.   Plan:   -Monitor weights daily as directed. Recent weight 170# on TCU  -Continue to monitor for worsening s/symptoms of concerns  -Avoid nephrotoxins  -Continue PTA triamterene/hydrochlorothiazide 37.5/25mg as directed for now. Adjust accordingly. HOLD if SBP<100  -Monitor urinary status  -Renally dose medications as directed  -CMP, CBC and vitamin D originally due 2/3/25, however lab error noted, therefore rescheduled for tomorrow 2/4/25  -Trend labs with PCP post TCU     (R33.9) Urinary retention with incomplete bladder emptying  (Z97.8) Ca catheter in place  Comment: Chronic. Per chart review, multiple attempts to remove ca catheter was in place without success. Patient asked for ca catheter to be replaced.   Plan:   -Continue ca catheter cares as directed  -Replace catheter every 4 weeks and PRN  -Follow up with urology as directed. Scheduled for 2/17/25  -Continue flomax 0.4mg daily  -CMP, CBC and vitamin D originally due 2/3/25, however lab error noted, therefore rescheduled for tomorrow 2/4/25  -Trend labs with PCP post TCU     (R13.10) Dysphagia, unspecified type  (E46) Malnutrition, unspecified type  (R19.7) Diarrhea, unspecified type  (K21.9) Gastroesophageal reflux disease without  esophagitis  (K70.30) Alcoholic cirrhosis of liver without ascites  Comment: Chronic. S/T above  Plan:   -Monitor BM patterns  -Monitor weights as directed  -Monitor for worsening s/symptoms of concerns  -Senna S 1 tab BID PRN  -Continue stress ulcer prophylaxis: Pantoprazole 40mg daily x 30 days  -Dietician to remain involved on site.   -CMP, CBC and vitamin D originally due 2/3/25, however lab error noted, therefore rescheduled for tomorrow 2/4/25  -Trend labs with PCP post TCU     (E11.9) Type 2 diabetes, HbA1c goal < 7% (H)  Comment: Chronic. Recent A1c~ 5.7%. Initially managed on insulin drip postop, transitioned to sliding scale & Lantus   Plan:   -Monitor Blood Glucose TID as directed  -Continue insulin lantus 7units at HS. HOLD if Blood Glucose<120 (PTa dose was 26 units at HS)  -CMP, CBC and vitamin D originally due 2/3/25, however lab error noted, therefore rescheduled for tomorrow 2/4/25  -Trend labs with PCP post TCU         (S21.109A) Wound of sternal region  (L30.9) Dermatitis  (Z86.2) Hx of leukocytosis  Comment: Acute on chronic. Recent history of sternal incision dermatitis-Skin glue remained in place and appeared to be irritating skin. Skin glue removed with adhesive remover. Patient reported that PRN atarax was helpful with itching, can continue with PRN atarax, however patient did not approve of its use on TCU, therefore this medication was discontinued. - 1/10 C. Diff neg. 1/14 infectious work-up for worsening leukocytosis negative thus far; follow for final results. 1/14 BLE US neg for DVT. 1/15 2-view CXR with low lung volumes, small bilateral pleural effusions but too small for drainage. Defer respiratory viral panel given stable on RA, afebrile. Klebsiella pneumoniae from sputum cultures on 12/31 and 1/9. Completed course of Zosyn from 1/9-1/20. Zosyn and vancomycin were restarted 1/9 for fever. Sputum culture again grew Klebsiella pneumoniae. 1/11 noted to have small puss drainage from  sternotomy. CT chest on 1/12/25, not concerning for deep infection. CT CAP 1/17 to evaluate abd pain, continued unexplained leukocytosis. Demonstrated large substernal fluid collection. Discontinue Vanc & Zosyn per Dr. Rosenbaum, and monitor off abx. Off all antibiotics since 1/20.  WBC remain normal. IR on Thursday 1/23/2025 per proceduralist:  Image guided access into retrosternal collection via a subxiphoid approach. Aspiration revealed scant serosanguinous return. No drain placed. Impression is consistent with hematoma. Patient declines CT scan to assess for residual collection. Patient refused repeat CT with contrast on 1/25/25.   Plan:   -Monitor for worsening s/symptoms of concerns  -Continue wound care as directed: Wound Care Right Groin: Wet to dry Nugauze strip light packing.  -Continue stress ulcer prophylaxis: Pantoprazole 40mg daily x 30 days  -DVT prophylaxis, HELD AC with substernal hematoma.   -Encourage activity OOB  -Continue sternal precautions  -Plan to follow up ECHO and CT chest with contrast prior to follow up appointment as indicated above  -CMP, CBC and vitamin D originally due 2/3/25, however lab error noted, therefore rescheduled for tomorrow 2/4/25  -Trend labs with PCP post TCU     (D62) Anemia due to blood loss, acute  (D75.839) Thrombocytosis  (Z86.2) Hx of thrombocytopenia  Comment: Acut mika chronic. Likely reactive. Hgb stable; Plt WNL, no signs or symptoms of active bleeding   Plan:   - PTA apixaban had been resumed for hx of PAF now s/p MAZE and LAAC with plan to defer long-term AC plan to OP Cardiology. Apixaban held on 1/20/25 with concern for growing substernal fluid collection now thought to be hematoma. Will plan to hold at discharge per Dr. Rosenbaum.   -Monitor for bleeding risks  -Continue stress ulcer prophylaxis: Pantoprazole 40mg daily x 30 days  -Monitor for worsening s/symptoms of concerns  -CMP, CBC and vitamin D originally due 2/3/25, however lab error noted, therefore  rescheduled for tomorrow 2/4/25  -Trend labs with PCP post TCU     (Z89.511) History of below-knee amputation of right lower extremity (H)  Comment: Chronic. Noted per chart review.      (A08.4) Viral gastroenteritis  (A04.72) C difficle colitis  (A08.11) Norovirus  Comment: Acute. Went out to ED on 1/28 with several hours of vomiting, diarrhea, and abdominal pain. Given patient's abdominal symptoms and elevated alk phos along with mildly elevated lipase wanted to obtain CT CAP. However, patient did not want to do that at this time. Compromised and did an abdominal ultrasound. Ultrasound showed tiny stone in the gallbladder without gallbladder inflammation, partial view of the pancreas with the portion of the pancreas within view appearing normal. However, unable to rule out pancreatitis at this time given no CT and poorly visualized pancrease on ultrasound. Stool testing back positive with C diff and norovirus  Plan:  -Continue vancomycin 125mg QID x 10 days  -Continue entneric precautions as directed  -Monitor BM patterns  -Continue zofran PRN  -TUMS PRN  -Continue stress ulcer prophylaxis: Pantoprazole 40mg daily x 30 days  -Monitor for worsening s/symptoms of concerns  -CMP, CBC and vitamin D originally due 2/3/25, however lab error noted, therefore rescheduled for tomorrow 2/4/25  -Trend labs with PCP post TCU     Electronically signed by:  Dr. Allison Conley DNP, APRN, FNP-C, WCS-C, EDS-C     Provider reviewed records from facility, and interpreted most recent imaging/lab work, and vital signs.   Acute and chronic conditions managed by writer. Have been reviewed during today's exam.

## 2025-02-03 ENCOUNTER — TRANSITIONAL CARE UNIT VISIT (OUTPATIENT)
Dept: GERIATRICS | Facility: CLINIC | Age: 65
End: 2025-02-03
Payer: MEDICARE

## 2025-02-03 VITALS
BODY MASS INDEX: 23.8 KG/M2 | HEIGHT: 71 IN | HEART RATE: 73 BPM | SYSTOLIC BLOOD PRESSURE: 123 MMHG | OXYGEN SATURATION: 97 % | TEMPERATURE: 98 F | DIASTOLIC BLOOD PRESSURE: 80 MMHG | RESPIRATION RATE: 18 BRPM | WEIGHT: 170 LBS

## 2025-02-03 DIAGNOSIS — A08.11 NOROVIRUS: ICD-10-CM

## 2025-02-03 DIAGNOSIS — Z95.1 S/P CABG (CORONARY ARTERY BYPASS GRAFT): ICD-10-CM

## 2025-02-03 DIAGNOSIS — Z87.01 HISTORY OF PNEUMONIA: ICD-10-CM

## 2025-02-03 DIAGNOSIS — E78.5 HYPERLIPIDEMIA LDL GOAL <100: ICD-10-CM

## 2025-02-03 DIAGNOSIS — Z91.89 AT RISK FOR PROLONGED QT INTERVAL SYNDROME: ICD-10-CM

## 2025-02-03 DIAGNOSIS — R19.7 DIARRHEA, UNSPECIFIED TYPE: ICD-10-CM

## 2025-02-03 DIAGNOSIS — A04.72 C. DIFFICILE COLITIS: ICD-10-CM

## 2025-02-03 DIAGNOSIS — Z86.69 HX OF ENCEPHALOPATHY: ICD-10-CM

## 2025-02-03 DIAGNOSIS — F33.0 MAJOR DEPRESSIVE DISORDER, RECURRENT EPISODE, MILD: ICD-10-CM

## 2025-02-03 DIAGNOSIS — M62.81 GENERALIZED MUSCLE WEAKNESS: ICD-10-CM

## 2025-02-03 DIAGNOSIS — G89.4 CHRONIC PAIN SYNDROME: ICD-10-CM

## 2025-02-03 DIAGNOSIS — A08.4 VIRAL GASTROENTERITIS: ICD-10-CM

## 2025-02-03 DIAGNOSIS — G47.00 INSOMNIA, UNSPECIFIED TYPE: ICD-10-CM

## 2025-02-03 DIAGNOSIS — R53.81 PHYSICAL DECONDITIONING: Primary | ICD-10-CM

## 2025-02-03 DIAGNOSIS — R33.9 URINARY RETENTION WITH INCOMPLETE BLADDER EMPTYING: ICD-10-CM

## 2025-02-03 DIAGNOSIS — F11.20 UNCOMPLICATED OPIOID DEPENDENCE (H): ICD-10-CM

## 2025-02-03 DIAGNOSIS — Z97.8 FOLEY CATHETER IN PLACE: ICD-10-CM

## 2025-02-03 DIAGNOSIS — E11.9 TYPE 2 DIABETES, HBA1C GOAL < 7% (H): ICD-10-CM

## 2025-02-03 DIAGNOSIS — Z89.511 S/P BELOW KNEE AMPUTATION, RIGHT (H): ICD-10-CM

## 2025-02-03 DIAGNOSIS — G62.9 NEUROPATHY: ICD-10-CM

## 2025-02-03 DIAGNOSIS — I48.0 PAF (PAROXYSMAL ATRIAL FIBRILLATION) (H): ICD-10-CM

## 2025-02-03 DIAGNOSIS — Z87.09 HX OF PNEUMOTHORAX: ICD-10-CM

## 2025-02-03 DIAGNOSIS — Z86.74 HISTORY OF CARDIAC ARREST: ICD-10-CM

## 2025-02-03 DIAGNOSIS — J96.01 ACUTE RESPIRATORY FAILURE WITH HYPOXIA (H): ICD-10-CM

## 2025-02-03 DIAGNOSIS — E87.6 HYPOKALEMIA: ICD-10-CM

## 2025-02-03 DIAGNOSIS — J90 BILATERAL PLEURAL EFFUSION: ICD-10-CM

## 2025-02-03 DIAGNOSIS — I25.119 CORONARY ARTERY DISEASE INVOLVING NATIVE CORONARY ARTERY OF NATIVE HEART WITH ANGINA PECTORIS: ICD-10-CM

## 2025-02-03 DIAGNOSIS — N17.9 AKI (ACUTE KIDNEY INJURY): ICD-10-CM

## 2025-02-03 DIAGNOSIS — E46 MALNUTRITION, UNSPECIFIED TYPE: ICD-10-CM

## 2025-02-03 DIAGNOSIS — K21.9 GASTROESOPHAGEAL REFLUX DISEASE WITHOUT ESOPHAGITIS: ICD-10-CM

## 2025-02-03 DIAGNOSIS — I10 HYPERTENSION GOAL BP (BLOOD PRESSURE) < 140/90: ICD-10-CM

## 2025-02-03 DIAGNOSIS — R13.10 DYSPHAGIA, UNSPECIFIED TYPE: ICD-10-CM

## 2025-02-03 DIAGNOSIS — F41.9 ANXIETY: ICD-10-CM

## 2025-02-03 PROCEDURE — 99309 SBSQ NF CARE MODERATE MDM 30: CPT | Performed by: NURSE PRACTITIONER

## 2025-02-03 NOTE — LETTER
2/3/2025      Erwin Aguilar  [address]  [place] [address]        [facility] GERIATRICS    Chief Complaint   Patient presents with     RECHECK     HPI:  Erwin Aguilar is a 64 year old  (1960), who is being seen today for an episodic care visit at: [facility] ([facility])([facility]) [[id number]]. Today's concern is: The primary encounter diagnosis was Physical deconditioning. Diagnoses of Generalized muscle weakness, Coronary artery disease involving native coronary artery of native heart with angina pectoris, Hypertension goal BP (blood pressure) < 140/90, S/P CABG (coronary artery bypass graft), Hyperlipidemia LDL goal <100, At risk for prolonged QT interval syndrome, PAF (paroxysmal atrial fibrillation) (H), History of cardiac arrest, Acute respiratory failure with hypoxia (H), Hx of pneumothorax, History of pneumonia, Bilateral pleural effusion, Hx of encephalopathy, Neuropathy, Insomnia, unspecified type, Anxiety, Chronic pain syndrome, Major depressive disorder, recurrent episode, mild, Uncomplicated opioid dependence (H), AUSTIN (acute kidney injury), Hypokalemia, Urinary retention with incomplete bladder emptying, Rodriguez catheter in place, Dysphagia, unspecified type, Malnutrition, unspecified type, Diarrhea, unspecified type, Gastroesophageal reflux disease without esophagitis, Type 2 diabetes, HbA1c goal < 7% (H), Viral gastroenteritis, S/P below knee amputation, right (H), Norovirus, and C. difficile colitis were also pertinent to this visit.    Met with patient who denies any chest pain, palpitations, shortness of breath, CHAVEZ, lightheadedness, dizziness, or cough. Denies any abdominal discomfort. Denies N&V. Denies B&B concerns. Denies dysuria or frequency. Denies loose or constipation. Improvement of c diff and norovirus symptoms. Appetite good. Sleeping well. Denies any pain complaints    BP Readings from Last 3 Encounters:   02/03/25 123/80   01/31/25 (!)  "143/66   01/29/25 124/62     Wt Readings from Last 5 Encounters:   02/03/25 77.1 kg (170 lb)   01/31/25 77.1 kg (170 lb)   01/29/25 77.3 kg (170 lb 6.4 oz)   01/28/25 78 kg (172 lb)   01/28/25 77.3 kg (170 lb 6.4 oz)     Allergies, and PMH/PSH reviewed in EPIC today.  REVIEW OF SYSTEMS:  4 point ROS including Respiratory, CV, GI and , other than that noted in the HPI,  is negative    Objective:   /80   Pulse 73   Temp 98  F (36.7  C)   Resp 18   Ht 1.803 m (5' 11\")   Wt 77.1 kg (170 lb)   SpO2 97%   BMI 23.71 kg/m    GENERAL APPEARANCE:  Alert, in no distress, cooperative  ENT:  Mouth and posterior oropharynx normal, moist mucous membranes, normal hearing acuity  EYES:  EOM, conjunctivae, lids, pupils and irises normal  NECK:  No adenopathy,masses or thyromegaly  RESP:  respiratory effort and palpation of chest normal, lungs clear to auscultation , no respiratory distress  CV:  regular rate and rhythm, no murmur, rub, or gallop  ABDOMEN:  normal bowel sounds, soft, nontender, no hepatosplenomegaly or other masses, no guarding or rebound  M/S:   ambulates with walker.   SKIN:  Inspection of skin and subcutaneous tissue baseline, Palpation of skin and subcutaneous tissue baseline  NEURO:   Cranial nerves 2-12 are normal tested and grossly at patient's baseline, no purposeful movement in upper and lower extremities  PSYCH:  oriented X 3, affect and mood normal    Most Recent 3 CBC's:  Recent Labs   Lab Test 01/28/25  0315 01/25/25  0411 01/24/25  1709   WBC 9.6 6.8 7.9   HGB 11.8* 9.3* 10.3*   MCV 94 95 93    222 255     Most Recent 3 BMP's:  Recent Labs   Lab Test 01/28/25  1446 01/28/25  0947 01/28/25  0315 01/25/25  0738 01/25/25  0411 01/24/25  0715 01/24/25  0454   NA  --   --  137  --  142  --  139   POTASSIUM  --   --  3.5  --  3.7  --  3.3*   CHLORIDE  --   --  100  --  107  --  104   CO2  --   --  23  --  27  --  27   BUN  --   --  11.8  --  8.0  --  8.2   CR  --   --  0.58*  --  0.60*  " --  0.60*   ANIONGAP  --   --  14  --  8  --  8   AFR[place]  --   --  8.8  --  8.4*  --  9.0   * 137* 174*   < > 122*   < > 64*    < > = values in this interval not displayed.     Most Recent 2 LFT's:  Recent Labs   Lab Test 01/28/25 0315 01/04/25  0216   AST 31 472*   ALT 33 213*   ALKPHOS 268* 66   BILITOTAL 0.6 0.7     Most Recent Anemia Panel:  Recent Labs   Lab Test 01/28/25 0315   WBC 9.6   HGB 11.8*   HCT 37.1*   MCV 94        ASSESSMENT/PLAN:     (R53.81) Physical deconditioning  (primary encounter diagnosis)  (M62.81) Generalized muscle weakness  Comment: Acute on chronic. S/T below diagnosis.   Plan:   -Continue Physical therapy and Occupational therapy as directed  -SW to remain involved for safe discharge planning     (I25.119) Coronary artery disease involving native coronary artery of native heart with angina pectoris  (Z95.1) S/P CABG (coronary artery bypass graft)  (I10) Hypertension goal BP (blood pressure) < 140/90  (E78.5) Hyperlipidemia LDL goal <100  (Z91.89) At risk for prolonged QT interval syndrome  (I48.0) PAF (paroxysmal atrial fibrillation) (H)  (Z86.74) History of cardiac arrest  (E66.01) Morbid obesity  Comment: initially presented on 12/31/24 to the [facility] ED with arm pain, vomiting, and diarrhea; found to have ACS and ST depression. He subsequently had VT arrest with 1 round of CPR, epinephrine, defibrillation. After ROSC was found to be in afib with RVR. Became hypotensive and was subsequently cannulated for [facility] ECMO. Found to have multivessel disease s/p CABG x4 on 1/2/25 with Dr. Rosenbaum. ECMO decannulated on 1/3/25 at bedside. Post operative ECHO 1/5 with LVEF 55-60%, normal RV function, no pericardial effusion. 1/17 Repeat echo in setting of therapeutic AC showed LVEF 60-65%, small, loculated pericardial effusion without HD compromise. Echo 1/21 showed EF 55-60%, large pericardial effusion without HD compromise, and RA pressure of 8 mmHg  Plan:   -Monitor BP and HR as  directed  -outpatient CT and echo scheduled for 2/18/25  -Follow up with cardiology as directed. Scheduled for 2/26/25  -Continue asa 81mg daily  -Continue rosuvastatin 40mg daily  -Continue metoprolol tartrate 50mg BID. HOLD if SBP<100 and/or HR<55  -Continue losartan 25mg daily. HOLD if SBP<100  -CMP, CBC and vitamin D originally due 2/3/25, however lab error noted, therefore rescheduled for tomorrow 2/4/25  -Trend labs with PCP post TCU     (J96.01) Acute respiratory failure with hypoxia (H)  (Z87.09) Hx of pneumothorax  (Z87.01) History of pneumonia  (J90) Bilateral pleural effusion  Comment:  ECMO decannulated on 1/3/25 at bedside. ICU course complicated by large R PTX seen on chest CT on 1/4/25 s/p bedside CT placement, VAP, and encephalopathy. Extubated 1/11/25.   Plan:   -Monitor respiratory status  -Monitor for worsening s/symptoms of concerns  -Encourage Incentive spirometry every 4 hours while awake  -PRN albuterol as indicated and advised  -Encourage activity OOB  -CMP, CBC and vitamin D originally due 2/3/25, however lab error noted, therefore rescheduled for tomorrow 2/4/25  -Trend labs with PCP post TCU     (Z86.69) Hx of encephalopathy  (G62.9) Neuropathy  (G47.00) Insomnia, unspecified type  (F41.9) Anxiety  (G89.4) Chronic pain syndrome  (F33.0) Major depressive disorder, recurrent episode, mild  (F11.20) Uncomplicated opioid dependence (H)  Comment: Chronic. Encephalopathy resolved PTA. Chronic opioid use noted per chart review. Declines hydroxyzine orders, however per chart review it appears this should have been discontinued in the s/o delirium. 1/1 vEEG without seizure activity (severe encephalopathy) and 12/31, 1/4, and 1/7 CTH negative for acute findings.   Plan:   -Monitor mood and behaviors  -Follow up with EMG post TCU with Dr Dixon as directed  -Monitor for changes in mobility, eating and sleeping patterns  -Continue thiamine 100mg daily  -Continue gabapentin 300mg daily at 1600 and 900mg at  HS  -Continue Tylenol 650mg every 8 hours PRN  -Continue oxycodone 10mg TID PRN  -HOLD medical cannabis while on TCU  -Continue robaxin 500mg TID PRN. Recommend to discontinue if not used  -Continue PTA baclofen 10mg TID PRN as directed  -Continue PTA sertraline 200mg daily as directed  -Continue ramelteon 8mg for sleep at HS as directed  -CMP, CBC and vitamin D originally due 2/3/25, however lab error noted, therefore rescheduled for tomorrow 2/4/25  -Trend labs with PCP post TCU     (N17.9) AUSTIN (acute kidney injury)  (E87.6) Hypokalemia  Comment: Acute on chronic. Improving. BL creat ~ 0.6-0.8, creatinine at baseline; adequate UOP with multiple unmeasured voids. Only Bed Weights recorded. Prosthesis now fitting, can get standing weight for baseline. Patient reports home weight around 180-182 lbs.   Plan:   -Monitor weights daily as directed. Recent weight 170# on TCU  -Continue to monitor for worsening s/symptoms of concerns  -Avoid nephrotoxins  -Continue PTA triamterene/hydrochlorothiazide 37.5/25mg as directed for now. Adjust accordingly. HOLD if SBP<100  -Monitor urinary status  -Renally dose medications as directed  -CMP, CBC and vitamin D originally due 2/3/25, however lab error noted, therefore rescheduled for tomorrow 2/4/25  -Trend labs with PCP post TCU     (R33.9) Urinary retention with incomplete bladder emptying  (Z97.8) Ca catheter in place  Comment: Chronic. Per chart review, multiple attempts to remove ca catheter was in place without success. Patient asked for ca catheter to be replaced.   Plan:   -Continue ca catheter cares as directed  -Replace catheter every 4 weeks and PRN  -Follow up with urology as directed. Scheduled for 2/17/25  -Continue flomax 0.4mg daily  -CMP, CBC and vitamin D originally due 2/3/25, however lab error noted, therefore rescheduled for tomorrow 2/4/25  -Trend labs with PCP post TCU     (R13.10) Dysphagia, unspecified type  (E46) Malnutrition, unspecified  type  (R19.7) Diarrhea, unspecified type  (K21.9) Gastroesophageal reflux disease without esophagitis  (K70.30) Alcoholic cirrhosis of liver without ascites  Comment: Chronic. S/T above  Plan:   -Monitor BM patterns  -Monitor weights as directed  -Monitor for worsening s/symptoms of concerns  -Senna S 1 tab BID PRN  -Continue stress ulcer prophylaxis: Pantoprazole 40mg daily x 30 days  -Dietician to remain involved on site.   -CMP, CBC and vitamin D originally due 2/3/25, however lab error noted, therefore rescheduled for tomorrow 2/4/25  -Trend labs with PCP post TCU     (E11.9) Type 2 diabetes, HbA1c goal < 7% (H)  Comment: Chronic. Recent A1c~ 5.7%. Initially managed on insulin drip postop, transitioned to sliding scale & Lantus   Plan:   -Monitor Blood Glucose TID as directed  -Continue insulin lantus 7units at HS. HOLD if Blood Glucose<120 (PTa dose was 26 units at HS)  -CMP, CBC and vitamin D originally due 2/3/25, however lab error noted, therefore rescheduled for tomorrow 2/4/25  -Trend labs with PCP post TCU         (S21.109A) Wound of sternal region  (L30.9) Dermatitis  (Z86.2) Hx of leukocytosis  Comment: Acute on chronic. Recent history of sternal incision dermatitis-Skin glue remained in place and appeared to be irritating skin. Skin glue removed with adhesive remover. Patient reported that PRN atarax was helpful with itching, can continue with PRN atarax, however patient did not approve of its use on TCU, therefore this medication was discontinued. - 1/10 C. Diff neg. 1/14 infectious work-up for worsening leukocytosis negative thus far; follow for final results. 1/14 BLE US neg for DVT. 1/15 2-view CXR with low lung volumes, small bilateral pleural effusions but too small for drainage. Defer respiratory viral panel given stable on RA, afebrile. Klebsiella pneumoniae from sputum cultures on 12/31 and 1/9. Completed course of Zosyn from 1/9-1/20. Zosyn and vancomycin were restarted 1/9 for fever. Sputum  culture again grew Klebsiella pneumoniae. 1/11 noted to have small puss drainage from sternotomy. CT chest on 1/12/25, not concerning for deep infection. CT CAP 1/17 to evaluate abd pain, continued unexplained leukocytosis. Demonstrated large substernal fluid collection. Discontinue Vanc & Zosyn per Dr. Rosenbaum, and monitor off abx. Off all antibiotics since 1/20.  WBC remain normal. IR on Thursday 1/23/2025 per proceduralist:  Image guided access into retrosternal collection via a subxiphoid approach. Aspiration revealed scant serosanguinous return. No drain placed. Impression is consistent with hematoma. Patient declines CT scan to assess for residual collection. Patient refused repeat CT with contrast on 1/25/25.   Plan:   -Monitor for worsening s/symptoms of concerns  -Continue wound care as directed: Wound Care Right Groin: Wet to dry Nugauze strip light packing.  -Continue stress ulcer prophylaxis: Pantoprazole 40mg daily x 30 days  -DVT prophylaxis, HELD AC with substernal hematoma.   -Encourage activity OOB  -Continue sternal precautions  -Plan to follow up ECHO and CT chest with contrast prior to follow up appointment as indicated above  -CMP, CBC and vitamin D originally due 2/3/25, however lab error noted, therefore rescheduled for tomorrow 2/4/25  -Trend labs with PCP post TCU     (D62) Anemia due to blood loss, acute  (D75.839) Thrombocytosis  (Z86.2) Hx of thrombocytopenia  Comment: Acut mika chronic. Likely reactive. Hgb stable; Plt WNL, no signs or symptoms of active bleeding   Plan:   - PTA apixaban had been resumed for hx of PAF now s/p MAZE and LAAC with plan to defer long-term AC plan to OP Cardiology. Apixaban held on 1/20/25 with concern for growing substernal fluid collection now thought to be hematoma. Will plan to hold at discharge per Dr. Rosenbaum.   -Monitor for bleeding risks  -Continue stress ulcer prophylaxis: Pantoprazole 40mg daily x 30 days  -Monitor for worsening s/symptoms of  concerns  -CMP, CBC and vitamin D originally due 2/3/25, however lab error noted, therefore rescheduled for tomorrow 2/4/25  -Trend labs with PCP post TCU     (Z89.511) History of below-knee amputation of right lower extremity (H)  Comment: Chronic. Noted per chart review.      (A08.4) Viral gastroenteritis  (A04.72) C difficle colitis  (A08.11) Norovirus  Comment: Acute. Went out to ED on 1/28 with several hours of vomiting, diarrhea, and abdominal pain. Given patient's abdominal symptoms and elevated alk phos along with mildly elevated lipase wanted to obtain CT CAP. However, patient did not want to do that at this time. Compromised and did an abdominal ultrasound. Ultrasound showed tiny stone in the gallbladder without gallbladder inflammation, partial view of the pancreas with the portion of the pancreas within view appearing normal. However, unable to rule out pancreatitis at this time given no CT and poorly visualized pancrease on ultrasound. Stool testing back positive with C diff and norovirus  Plan:  -Continue vancomycin 125mg QID x 10 days  -Continue entneric precautions as directed  -Monitor BM patterns  -Continue zofran PRN  -TUMS PRN  -Continue stress ulcer prophylaxis: Pantoprazole 40mg daily x 30 days  -Monitor for worsening s/symptoms of concerns  -CMP, CBC and vitamin D originally due 2/3/25, however lab error noted, therefore rescheduled for tomorrow 2/4/25  -Trend labs with PCP post TCU     Electronically signed by:  Dr. Allison Conley DNP, APRN, FNP-C, WCS-C, EDS-C     Provider reviewed records from facility, and interpreted most recent imaging/lab work, and vital signs.   Acute and chronic conditions managed by writer. Have been reviewed during today's exam.         Sincerely,        Allison Conley, APRUCHE CNP    Electronically signed

## 2025-02-04 ENCOUNTER — VIRTUAL VISIT (OUTPATIENT)
Dept: FAMILY MEDICINE | Facility: CLINIC | Age: 65
End: 2025-02-04
Attending: FAMILY MEDICINE
Payer: MEDICARE

## 2025-02-04 ENCOUNTER — MYC MEDICAL ADVICE (OUTPATIENT)
Dept: FAMILY MEDICINE | Facility: CLINIC | Age: 65
End: 2025-02-04

## 2025-02-04 DIAGNOSIS — R33.9 URINARY RETENTION: ICD-10-CM

## 2025-02-04 DIAGNOSIS — E11.42 TYPE 2 DIABETES MELLITUS WITH DIABETIC POLYNEUROPATHY, WITH LONG-TERM CURRENT USE OF INSULIN (H): ICD-10-CM

## 2025-02-04 DIAGNOSIS — Z95.1 S/P CABG (CORONARY ARTERY BYPASS GRAFT): ICD-10-CM

## 2025-02-04 DIAGNOSIS — Z00.00 ENCOUNTER FOR MEDICARE ANNUAL WELLNESS EXAM: Primary | ICD-10-CM

## 2025-02-04 DIAGNOSIS — Z79.4 TYPE 2 DIABETES MELLITUS WITH DIABETIC POLYNEUROPATHY, WITH LONG-TERM CURRENT USE OF INSULIN (H): ICD-10-CM

## 2025-02-04 LAB
ALBUMIN SERPL BCG-MCNC: 3.6 G/DL (ref 3.5–5.2)
ALP SERPL-CCNC: 218 U/L (ref 40–150)
ALT SERPL W P-5'-P-CCNC: 43 U/L (ref 0–70)
ANION GAP SERPL CALCULATED.3IONS-SCNC: 12 MMOL/L (ref 7–15)
AST SERPL W P-5'-P-CCNC: ABNORMAL U/L
BASOPHILS # BLD AUTO: 0.1 10E3/UL (ref 0–0.2)
BASOPHILS NFR BLD AUTO: 1 %
BILIRUB SERPL-MCNC: 0.3 MG/DL
BUN SERPL-MCNC: 8.4 MG/DL (ref 8–23)
CALCIUM SERPL-MCNC: 9.3 MG/DL (ref 8.8–10.4)
CHLORIDE SERPL-SCNC: 99 MMOL/L (ref 98–107)
CREAT SERPL-MCNC: 0.57 MG/DL (ref 0.67–1.17)
EGFRCR SERPLBLD CKD-EPI 2021: >90 ML/MIN/1.73M2
EOSINOPHIL # BLD AUTO: 0.2 10E3/UL (ref 0–0.7)
EOSINOPHIL NFR BLD AUTO: 2 %
ERYTHROCYTE [DISTWIDTH] IN BLOOD BY AUTOMATED COUNT: 14.6 % (ref 10–15)
GLUCOSE SERPL-MCNC: 130 MG/DL (ref 70–99)
HCO3 SERPL-SCNC: 23 MMOL/L (ref 22–29)
HCT VFR BLD AUTO: 35.4 % (ref 40–53)
HGB BLD-MCNC: 11.2 G/DL (ref 13.3–17.7)
IMM GRANULOCYTES # BLD: 0.1 10E3/UL
IMM GRANULOCYTES NFR BLD: 1 %
LYMPHOCYTES # BLD AUTO: 1.6 10E3/UL (ref 0.8–5.3)
LYMPHOCYTES NFR BLD AUTO: 17 %
MCH RBC QN AUTO: 29.6 PG (ref 26.5–33)
MCHC RBC AUTO-ENTMCNC: 31.6 G/DL (ref 31.5–36.5)
MCV RBC AUTO: 93 FL (ref 78–100)
MONOCYTES # BLD AUTO: 0.7 10E3/UL (ref 0–1.3)
MONOCYTES NFR BLD AUTO: 8 %
NEUTROPHILS # BLD AUTO: 6.4 10E3/UL (ref 1.6–8.3)
NEUTROPHILS NFR BLD AUTO: 71 %
NRBC # BLD AUTO: 0 10E3/UL
NRBC BLD AUTO-RTO: 0 /100
PLATELET # BLD AUTO: 242 10E3/UL (ref 150–450)
POTASSIUM SERPL-SCNC: 5 MMOL/L (ref 3.4–5.3)
PROT SERPL-MCNC: 7 G/DL (ref 6.4–8.3)
RBC # BLD AUTO: 3.79 10E6/UL (ref 4.4–5.9)
SODIUM SERPL-SCNC: 134 MMOL/L (ref 135–145)
VIT D+METAB SERPL-MCNC: 21 NG/ML (ref 20–50)
WBC # BLD AUTO: 9 10E3/UL (ref 4–11)

## 2025-02-04 PROCEDURE — 36415 COLL VENOUS BLD VENIPUNCTURE: CPT | Performed by: NURSE PRACTITIONER

## 2025-02-04 PROCEDURE — 85004 AUTOMATED DIFF WBC COUNT: CPT | Performed by: NURSE PRACTITIONER

## 2025-02-04 PROCEDURE — 84155 ASSAY OF PROTEIN SERUM: CPT | Performed by: NURSE PRACTITIONER

## 2025-02-04 PROCEDURE — 85048 AUTOMATED LEUKOCYTE COUNT: CPT | Performed by: NURSE PRACTITIONER

## 2025-02-04 PROCEDURE — 82040 ASSAY OF SERUM ALBUMIN: CPT | Performed by: NURSE PRACTITIONER

## 2025-02-04 PROCEDURE — 82306 VITAMIN D 25 HYDROXY: CPT | Performed by: NURSE PRACTITIONER

## 2025-02-04 ASSESSMENT — ANXIETY QUESTIONNAIRES: GAD7 TOTAL SCORE: INCOMPLETE

## 2025-02-04 NOTE — PATIENT INSTRUCTIONS
"Annual wellness:  I will mail out to him preventive care \"to do\" list as below:      -Colon Cancer Screening: Last done via colonoscopy on 5/2016. (Impression: One polyp removed. Due now so could consider this in the next few months after ample recovery from CABG .     -PSA: Last done 5/2024 (Result: 0.25). can repeat next visit (plan next diabetes visit in six months/June)    -Eye Exam: Last recorded in our chart 6/2022 so I will give referral.     -A1C: Last done 12/31/24 . (Result: 5.7)     -Immunizations: according to state database and our chart would be due for this year's covid booster, pneumonia vaccine, Shingles, RSV, Hepatitis A, and Hepatitis B. You could get any of these from a pharmacy.  I would highly recommend covid booster and pneumonia and rsv       S/p cabg: lost weight 166 now . In transitional care until Friday. Has care conference on Thursday.  Has everything that needs at home now (raised toilet seat, hospital bed, etc).  Had poor memory after surgery but now very much feels back to normal and stamina improving.  Able to walk with prosthetic again.     Urinary retention: Has catheter now for urinary retention/benign prostatic hypertrophy from his description.  Has follow up with a urologist mid February.     Diabetes:  A1c under six end of December and he states his insulin needs are lessening actually with the weigh loss above.  Current blood sugars 70s-90s fasting and 150s after meals.  Has been ordering out extra food to help with weight gain.     Follow up six months for diabetes check  Patient Education   Preventive Care Advice   This is general advice given by our system to help you stay healthy. However, your care team may have specific advice just for you. Please talk to your care team about your preventive care needs.  Nutrition  Eat 5 or more servings of fruits and vegetables each day.  Try wheat bread, brown rice and whole grain pasta (instead of white bread, rice, and pasta).  Get " enough calcium and vitamin D. Check the label on foods and aim for 100% of the RDA (recommended daily allowance).  Lifestyle  Exercise at least 150 minutes each week  (30 minutes a day, 5 days a week).  Do muscle strengthening activities 2 days a week. These help control your weight and prevent disease.  No smoking.  Wear sunscreen to prevent skin cancer.  Have a dental exam and cleaning every 6 months.  Yearly exams  See your health care team every year to talk about:  Any changes in your health.  Any medicines your care team has prescribed.  Preventive care, family planning, and ways to prevent chronic diseases.  Shots (vaccines)   HPV shots (up to age 26), if you've never had them before.  Hepatitis B shots (up to age 59), if you've never had them before.  COVID-19 shot: Get this shot when it's due.  Flu shot: Get a flu shot every year.  Tetanus shot: Get a tetanus shot every 10 years.  Pneumococcal, hepatitis A, and RSV shots: Ask your care team if you need these based on your risk.  Shingles shot (for age 50 and up)  General health tests  Diabetes screening:  Starting at age 35, Get screened for diabetes at least every 3 years.  If you are younger than age 35, ask your care team if you should be screened for diabetes.  Cholesterol test: At age 39, start having a cholesterol test every 5 years, or more often if advised.  Bone density scan (DEXA): At age 50, ask your care team if you should have this scan for osteoporosis (brittle bones).  Hepatitis C: Get tested at least once in your life.  STIs (sexually transmitted infections)  Before age 24: Ask your care team if you should be screened for STIs.  After age 24: Get screened for STIs if you're at risk. You are at risk for STIs (including HIV) if:  You are sexually active with more than one person.  You don't use condoms every time.  You or a partner was diagnosed with a sexually transmitted infection.  If you are at risk for HIV, ask about PrEP medicine to  prevent HIV.  Get tested for HIV at least once in your life, whether you are at risk for HIV or not.  Cancer screening tests  Cervical cancer screening: If you have a cervix, begin getting regular cervical cancer screening tests starting at age 21.  Breast cancer scan (mammogram): If you've ever had breasts, begin having regular mammograms starting at age 40. This is a scan to check for breast cancer.  Colon cancer screening: It is important to start screening for colon cancer at age 45.  Have a colonoscopy test every 10 years (or more often if you're at risk) Or, ask your provider about stool tests like a FIT test every year or Cologuard test every 3 years.  To learn more about your testing options, visit:   .  For help making a decision, visit:   https://bit.ly/pb86666.  Prostate cancer screening test: If you have a prostate, ask your care team if a prostate cancer screening test (PSA) at age 55 is right for you.  Lung cancer screening: If you are a current or former smoker ages 50 to 80, ask your care team if ongoing lung cancer screenings are right for you.  For informational purposes only. Not to replace the advice of your health care provider. Copyright   2023 [employer]. All rights reserved. Clinically reviewed by the [facility] Transitions Program. [employer] [id number] - REV 01/24.

## 2025-02-04 NOTE — TELEPHONE ENCOUNTER
Appt changed to Video  as patient is in Transitional Care Unit   [facility] Coordinator

## 2025-02-04 NOTE — CONFIDENTIAL NOTE
MEDICAL RECORDS REQUEST   [facility]  [facility]  [address] [place] [address]  PHONE: [phone]  Fax: [phone]        FUTURE VISIT INFORMATION                                                   RIANA Mahmood: 1960 scheduled for future visit at [facility]    APPOINTMENT INFORMATION:  Date: 2025  Provider:  Rodriguez Ruelas PA-C  Reason for Visit/Diagnosis: Urinary retention and TOV    RECORDS REQUESTED FOR VISIT                                                     NOTES  STATUS/DETAILS   OFFICE NOTE from other specialist  yes - [facility] FP: 25, 25    DISCHARGE SUMMARY from hospital  yes - [facility]: 25   DISCHARGE REPORT from the ER  yes - [facility]: 24, 25   MEDICATION LIST  yes   LABS/IMAGING     URINALYSIS (UA)  yes - 25   IMAGING  yes -   [facility]:  CT ABD/PEL: 25, 25, 24  XR ABD: 25, 25, 25, 24  US ABD: 25, 24, 22, 10/04/21     PRE-VISIT CHECKLIST      Joint diagnostic appointment coordinated correctly          (ensure right order & amount of time) Yes   RECORD COLLECTION COMPLETE Yes

## 2025-02-04 NOTE — PROGRESS NOTES
Wellness Visit Notes:     -Colon Cancer Screening: Last done via colonoscopy on 5/2016. (Impression: One polyp removed. Repeat 3 years d/t prep quality). Due now. {Plan/Patient decision:170988}  -PSA: Last done 5/2024 (Result: 0.25). Discussed risks/benefits of PSA testing today in detail, including possibility of false positive results and/or possibility of biopsy recommended as next step. {patient decision:894427}    -Dermatology: Pt verbalized they {Dermatology AWV:287990}.    -Diabetic Foot Exam: Provider to complete.  -Eye Exam: Last done 6/2022.  -A1C: Last done 12/31/24 . (Result: 5.7)    -Immunizations: Patient is due/able to receive at clinic today: Covid - *** and Pneumococcal - ***   Patient is due for the following vaccines: Shingles, RSV, Hepatitis A, and Hepatitis B. Advised patient to receive at a pharmacy due to Medicare insurance coverage.     Asthma Action Plan completed. {Action Plan action:311458}

## 2025-02-04 NOTE — PROGRESS NOTES
"Answers submitted by the patient for this visit:  Patient Health Questionnaire (Submitted on 2/4/2025)  PHQ9 TOTAL SCORE: Incomplete  Patient Health Questionnaire (G7) (Submitted on 2/4/2025)  JATIN 7 TOTAL SCORE: Incomplete  Erwin is a 64 year old who is being evaluated via a billable video visit.    How would you like to obtain your AVS? MyChart  If the video visit is dropped, the invitation should be resent by: Text to cell phone: [phone]  Will anyone else be joining your video visit? No      Annual wellness:  I will mail out to him preventive care \"to do\" list as below:      -Colon Cancer Screening: Last done via colonoscopy on 5/2016. (Impression: One polyp removed. Due now so could consider this in the next few months after ample recovery from CABG .     -PSA: Last done 5/2024 (Result: 0.25). can repeat next visit (plan next diabetes visit in six months/June)    -Eye Exam: Last recorded in our chart 6/2022 so I will give referral.     -A1C: Last done 12/31/24 . (Result: 5.7)     -Immunizations: according to state database and our chart would be due for this year's covid booster, pneumonia vaccine, Shingles, RSV, Hepatitis A, and Hepatitis B. You could get any of these from a pharmacy.  I would highly recommend covid booster and pneumonia and rsv       S/p cabg: lost weight 166 now . In transitional care until Friday. Has care conference on Thursday.  Has everything that needs at home now (raised toilet seat, hospital bed, etc).  Had poor memory after surgery but now very much feels back to normal and stamina improving.  Able to walk with prosthetic again.     Urinary retention: Has catheter now for urinary retention/benign prostatic hypertrophy from his description.  Has follow up with a urologist mid February.     Diabetes:  A1c under six end of December and he states his insulin needs are lessening actually with the weigh loss above.  Current blood sugars 70s-90s fasting and 150s after meals.  Has been " ordering out extra food to help with weight gain.     Follow up six months for diabetes check    In addition to preventive health exam and counseling 25 minutes spent on the date of the encounter doing chart review, history and exam, negotiating and explaining the plan with the patient, documentation and further activities as noted above.      The longitudinal plan of care for the diagnosis(es)/condition(s) as documented were addressed during this visit. Due to the added complexity in care, I will continue to support Erwin in the subsequent management and with ongoing continuity of care.        Subjective   Erwin is a 64 year old, presenting for the following health issues:  No chief complaint on file.        2/4/2025    11:26 AM   Additional Questions   Roomed by zane staton   Accompanied by marisa         2/4/2025    11:26 AM   Patient Reported Additional Medications   Patient reports taking the following new medications n/a       Video Start Time:  12:00    HPI   Answers submitted by the patient for this visit:  Patient Health Questionnaire (Submitted on 2/4/2025)  PHQ9 TOTAL SCORE: Incomplete  Patient Health Questionnaire (G7) (Submitted on 2/4/2025)  JATIN 7 TOTAL SCORE: Incomplete    Annual Wellness Visit     Patient has been advised of split billing requirements and indicates understanding: Yes         Health Care Directive  Patient has a Health Care Directive on file  Advance care planning document is on file but is outdated.  Patient encouraged to update.  In general, how would you rate your overall physical health? good  Do you have a special diet?  Low fat/cholesterol        12/31/2020   Exercise, Social Connection, Stress   Days per week of moderate/strenous exercise 0 days   Average minutes spent exercising at this level 0 min     Do you see a dentist two times every year?  Yes  Have you been more tired than usual lately?  (!) YES   Discussed possible causes of fatigue.   If you drink alcohol do you typically  have >3 drinks per day or >7 drinks per week? No  Do you have a current opioid prescription? (!) YES   How severe is your pain on a scale from 1-10? 4/10         2025     2:39 PM   OPIOID RISK TOOL TOTAL SCORE   Total Score 0     Low Risk (0-3)  Moderate Risk (4-7)  High Risk (>8)        2025     2:40 PM   RIOSORD Total Score   RIOSORD Total Score 0     Average probability of overdose or serious opioid-induced respiratory depression in the next six months:   Points    Risk   0-4    2%   5-7    5%   8-9    7%   10-17    15%   18-25    30%   26-41    55%   42    83%  Do you use any other controlled substances or medications that are not prescribed by a provider? None  Social History     Tobacco Use    Smoking status: Former     Current packs/day: 0.00     Types: Cigarettes     Start date: 10/13/1983     Quit date: 1984     Years since quittin.6     Passive exposure: Past    Smokeless tobacco: Never   Vaping Use    Vaping status: Every Day    Substances: THC, CBD    Devices: [employer]ble tank   Substance Use Topics    Alcohol use: Not Currently     Comment: - last drink was 3/28/14    Drug use: Yes     Frequency: 2.0 times per week     Types: Marijuana     Comment: medical marijuana - vape daily       Needs assistance for the following daily activities: no assistance needed  Which of the following safety concerns are present in your home?  none identified   Do you (or your family members) have any concerns about your safety while driving?  No  Do you have any of the following hearing concerns?: No hearing concerns  In the past 6 months, have you been bothered by leaking of urine? No        2024   Social Factors   Worry food won't last until get money to buy more No   Food not last or not have enough money for food? No   Do you have housing? (Housing is defined as stable permanent housing and does not include staying ouside in a car, in a tent, in an abandoned building, in an overnight shelter, or  couch-surfing.) Yes   Are you worried about losing your housing? No   Lack of transportation? Yes   Unable to get utilities (heat,electricity)? No         1/9/2024   Fall Risk   Fallen 2 or more times in the past year? Yes        Today's PHQ-9 Score:       2/4/2025    11:28 AM   PHQ-9 SCORE   PHQ-9 Total Score MyChart Incomplete     Last PSA:   Prostate Specific Antigen Screen   Date Value Ref Range Status   05/03/2024 0.25 0.00 - 4.50 ng/mL Final     ASCVD Risk   The ASCVD Risk score (Shea VANEGAS, et al., 2019) failed to calculate for the following reasons:    Risk score cannot be calculated because patient has a medical history suggesting prior/existing ASCVD            Reviewed and updated as needed this visit by Provider                    Past Medical History:   Diagnosis Date    Actinic keratosis     aldara    Anxiety 12/1997    Cancer (H)     squamous cell skin CA    Cauda equina spinal cord injury (H)     Chronic sinusitis 05/01/2016    Cirrhosis of liver (H) 04/01/2014    Not biopsy-proven as of 4/1/14.      Depressive disorder     Diastasis recti     Esophageal reflux     Esophageal varices in cirrhosis (H) 04/01/2014    Hospitalized for UGI blee 3/28/14, endoscopy revealed bleeding varices.    Essential hypertension, benign     Intermittent asthma     Mild depression     Mixed hyperlipidemia     Nasal polyps 05/01/2016    Nausea vomiting and diarrhea 1/28/2025    Osteoarthritis of lumbar spine, unspecified spinal osteoarthritis complication status     Other chronic pain     Paroxysmal atrial fibrillation (H) 09/22/2021    SCCA (squamous cell carcinoma) of skin     Seasonal allergic conjunctivitis     Type II or unspecified type diabetes mellitus without mention of complication, not stated as uncontrolled     Unspecified site of spinal cord injury without evidence of spinal bone injury     due to back surgery     Past Surgical History:   Procedure Laterality Date    ABDOMEN SURGERY  2014    AMPUTATE  LEG BELOW KNEE Right 05/09/2024    Procedure: Right below knee amputation (transtibial amputation);  Surgeon: Kurtis Nye MD;  Location: [facility]    ANGIOGRAM Right 04/23/2024    Procedure: Ultrasound guided percutaneous transarterial left common femoral artery access, diagnostic aortoiliac angiogram, selective cannulation of right comon iliac, righ external iliac, right common femoral, and right superficial femoral artery, balloon angioplasty of right superficial femoral artery, 6mm x 12 cm self-expanding drug-coated stent placement of right superficial femoral artery, lef    BACK SURGERY  08/2009    BYPASS GRAFT ARTERY CORONARY N/A 01/02/2025    Procedure: Median Sternotomy, on Cardiopulmonary Bypass Pump, Left Internal Mammary Artery Esbon, Endoscopic Esbon of Left Greater Saphenous Vein, Coronary Artery Bypass Graft x 4, Left Atrial Appendage Ligation, Left Atrial Ablation, and Intraoperative Transesophageal Echocardiogram By Anesthesia;  Surgeon: Bernice Rosenbaum MD;  Location: [facility]    COLONOSCOPY N/A 05/12/2016    Procedure: COMBINED COLONOSCOPY, SINGLE OR MULTIPLE BIOPSY/POLYPECTOMY BY BIOPSY;  Surgeon: Ana Paula Urbina MD;  Location:  GI    CV ARTERIAL LINE PLACEMENT N/A 12/31/2024    Procedure: Arterial Line Placement;  Surgeon: Sharif Tamez MD;  Location:  [facility]ATH [facility]    CV CENTRAL VENOUS CATHETER PLACEMENT N/A 12/31/2024    Procedure: Central Venous Catheter Placement;  Surgeon: Sharif Tamez MD;  Location:  [facility]    CV CORONARY ANGIOGRAM N/A 12/31/2024    Procedure: Coronary Angiogram;  Surgeon: Sharif Tamez MD;  Location:  [facility]    CV EXTRACORPERAL MEMBRANE OXYGENATION N/A 12/31/2024    Procedure: Extracorporeal Membrance Oxygenation;  Surgeon: Sharif Tamez MD;  Location:  HEART [facility]ARD[facility] CATH LAB    DECOMPRESSION CUBITAL TUNNEL Left 08/31/2023    Procedure: LEFT CUBITAL TUNNEL RELEASE,;  Surgeon: Deny Dixon MD;   Location: [facility] OR    ENDOSCOPY UPPER, COLONOSCOPY, COMBINED  10/19/2011    Procedure:COMBINED ENDOSCOPY UPPER, COLONOSCOPY; Upper Endoscopy, Colonoscopy with Polypectomy x4; Surgeon:AMBAR RODRÍGUEZ; Location: OR    ENT SURGERY  01/2016    Ongoing since then    ESOPHAGOSCOPY, GASTROSCOPY, DUODENOSCOPY (EGD), COMBINED  03/28/2014    Procedure: COMBINED ESOPHAGOSCOPY, GASTROSCOPY, DUODENOSCOPY (EGD);  EGD, Gastric Biopsies, Esophageal Banding;  Surgeon: Reyna Tovar MD;  Location:  OR    ESOPHAGOSCOPY, GASTROSCOPY, DUODENOSCOPY (EGD), COMBINED  06/09/2014    Procedure: COMBINED ESOPHAGOSCOPY, GASTROSCOPY, DUODENOSCOPY (EGD);  Surgeon: Curtis Mendez MD;  Location:  GI    ESOPHAGOSCOPY, GASTROSCOPY, DUODENOSCOPY (EGD), COMBINED  07/24/2014    Procedure: COMBINED ESOPHAGOSCOPY, GASTROSCOPY, DUODENOSCOPY (EGD);  Surgeon: Gerard Samaniego MD;  Location:  OR    ESOPHAGOSCOPY, GASTROSCOPY, DUODENOSCOPY (EGD), COMBINED N/A 10/31/2014    Procedure: COMBINED ESOPHAGOSCOPY, GASTROSCOPY, DUODENOSCOPY (EGD);  Surgeon: Gerard Samaniego MD;  Location:  OR    ESOPHAGOSCOPY, GASTROSCOPY, DUODENOSCOPY (EGD), COMBINED N/A 05/12/2016    Procedure: COMBINED ESOPHAGOSCOPY, GASTROSCOPY, DUODENOSCOPY (EGD);  Surgeon: Ana Paula Urbina MD;  Location:  GI    ESOPHAGOSCOPY, GASTROSCOPY, DUODENOSCOPY (EGD), COMBINED N/A 08/02/2018    Procedure: COMBINED ESOPHAGOSCOPY, GASTROSCOPY, DUODENOSCOPY (EGD);  EGD;  Surgeon: Yu Wagner MD;  Location:  GI    HCL SQUAMOUS CELL CARCINOMA AG  10/2007    left forearm    HERNIORRHAPHY UMBILICAL  11/08/2012    Procedure: HERNIORRHAPHY UMBILICAL;  Open Umbilical Hernia Repair With Mesh ;  Surgeon: Chase Nicholson MD;  Location:  OR    INSERT STIMULATOR DORSAL COLUMN N/A 06/28/2018    Procedure: INSERT STIMULATOR DORSAL COLUMN;;  Surgeon: Elvis Sauceda MD;  [facility]    IR FLUORO 0-1 HOUR  1/23/2025    IR LOWER  EXTREMITY ANGIOGRAM RIGHT  04/23/2024    neuro stimulator  2010    PICC DOUBLE LUMEN PLACEMENT Left 01/16/2025    Lateral brachial, 47 cm, 3 cm external length    RELEASE CARPAL TUNNEL Left 08/31/2023    Procedure: LEFT OPEN CARPAL TUNNEL RELEASE,;  Surgeon: Deny Dixon MD;  Location: [facility] OR    RELEASE TRIGGER FINGER Left 08/31/2023    Procedure: Release left trigger finger thumb;  Surgeon: Deny Dixon MD;  Location: [facility] OR    REMOVE EXTRACORPORAL MEMBRANE OXYGENATOR ADULT N/A 01/03/2025    Procedure: Remove Left Leg Peripheral Extracorporal Membrane Oxygenator and Closure;  Surgeon: Bernice Rosenbaum MD;  Location: [facility]    REMOVE GENERATOR STIMULATOR (LOCATION) N/A 06/28/2018    Procedure: REMOVE GENERATOR STIMULATOR (LOCATION);  Spinal Cord Stimulator and IPG Explant and Re-Implant of SCS System (Leads and IPG);  Surgeon: Elvis Sauceda MD;  [facility]    REPAIR TENDON ACHILLES Right 11/11/2020    Procedure: Right achilles debridement and partial calcaneus excision;  Surgeon: Eh Sandoval MD;  Location: [facility] OR    SURGICAL HISTORY OF -   01/2002    abcess tooth    SURGICAL HISTORY OF -   1999    L4-5 laminectomy, cauda equina syndrome    SURGICAL HISTORY OF -   +    herniated disk repair    TONSILLECTOMY  10/1964    TRANSPOSITION ULNAR NERVE (ELBOW)      right    ZZC APPENDECTOMY  1974       Current providers sharing in care for this patient include:  Patient Care Team:  Wegener, Joel Daniel Irwin, MD as PCP - General (Family Practice)  Kathleen Sen MD as MD (Family Medicine - Sports Medicine)  Elvis Hamilton MD as MD (Family Medicine - Sports Medicine)  Victor M Anaya DPM as MD (Podiatry)  Elvis Sauceda MD as MD (Anesthesiology)  Wegener, Joel Daniel Irwin, MD as Assigned PCP  Breanna Sahni, RN as Registered Nurse (Wound Care)  Carlito Grimm MD as MD (Anesthesiology)  Wegener, Joel Daniel Irwin, MD as Assigned  Pain Medication Provider  Randi Martinez as Other (see comments) (Acupuncture)  Leventhal, Thomas Michael, MD as Assigned Gastroenterology Provider  Carlito Grimm MD as MD (Anesthesiology)  Alejandra Doran APRN CNP as Assigned Surgical Provider  Dawit Fox MD as Assigned Heart and Vascular Provider  Roland Lira MD as Physician (Physical Medicine and Rehabilitation)  Roland Lira MD as Assigned Neuroscience Provider  Sharif Up MD as Assigned Pediatric Specialist Provider  Kurtis Nye MD as Assigned Musculoskeletal Provider  Tcu, Ebenezer [place] Haug, Bertina, APRN CNP as Nurse Practitioner (Internal Medicine)  Autumn Herring, RN as Lead Care Coordinator    The following health maintenance items are reviewed in Epic and correct as of today:  Health Maintenance   Topic Date Due    HEPATITIS A IMMUNIZATION (1 of 2 - Risk 2-dose series) Never done    COLORECTAL CANCER SCREENING  05/12/2019    HEPATITIS B IMMUNIZATION (1 of 3 - Risk 3-dose series) Never done    RSV VACCINE (1 - Risk 60-74 years 1-dose series) Never done    Pneumococcal Vaccine: 50+ Years (3 of 3 - PCV20 or PCV21) 06/26/2020    ZOSTER IMMUNIZATION (2 of 2) 11/16/2022    EYE EXAM  06/23/2023    DIABETIC FOOT EXAM  01/02/2024    COVID-19 Vaccine (8 - 2024-25 season) 09/01/2024    PHQ-9  09/08/2024    MICROALBUMIN  01/09/2025    ANNUAL REVIEW OF HM ORDERS  01/09/2025    FALL RISK ASSESSMENT  01/09/2025    ASTHMA CONTROL TEST  01/24/2025    ASTHMA ACTION PLAN  01/09/2025    LIPID  04/23/2025    PSA  05/03/2025    A1C  06/30/2025    BMP  08/04/2025    MEDICARE ANNUAL WELLNESS VISIT  02/04/2026    CMP  02/04/2026    CBC  02/04/2026    DTAP/TDAP/TD IMMUNIZATION (3 - Td or Tdap) 10/08/2029    ADVANCE CARE PLANNING  01/29/2030    HEPATITIS C SCREENING  Completed    DEPRESSION ACTION PLAN  Completed    INFLUENZA VACCINE  Completed    AORTIC ANEURYSM SCREENING (SYSTEM ASSIGNED)  Completed    HPV IMMUNIZATION  Aged Out     MENINGITIS IMMUNIZATION  Aged Out    HIV SCREENING  Discontinued       Appropriate preventive services were discussed with this patient, including applicable screening as appropriate for fall prevention, nutrition, physical activity, Tobacco-use cessation, weight loss and cognition.  Checklist reviewing preventive services available has been given to the patient.           No data to display                           Review of Systems  Constitutional, neuro, ENT, endocrine, pulmonary, cardiac, gastrointestinal, genitourinary, musculoskeletal, integument and psychiatric systems are negative, except as otherwise noted.      Objective           Vitals:  No vitals were obtained today due to virtual visit.    Physical Exam   GENERAL: alert and no distress  EYES: Eyes grossly normal to inspection.  No discharge or erythema, or obvious scleral/conjunctival abnormalities.  RESP: No audible wheeze, cough, or visible cyanosis.    SKIN: Visible skin clear. No significant rash, abnormal pigmentation or lesions.  NEURO: Cranial nerves grossly intact.  Mentation and speech appropriate for age.  PSYCH: Appropriate affect, tone, and pace of words          Video-Visit Details    Type of service:  Video Visit   Video End Time: 12:27  Originating Location (pt. Location): Home    Distant Location (provider location):  On-site  Platform used for Video Visit: Kameron  Signed Electronically by: Joel Daniel Wegener, MD

## 2025-02-10 ENCOUNTER — TELEPHONE (OUTPATIENT)
Dept: FAMILY MEDICINE | Facility: CLINIC | Age: 65
End: 2025-02-10
Payer: MEDICARE

## 2025-02-10 NOTE — TELEPHONE ENCOUNTER
Called RODERICK Dhaliwal.  Relayed message from provider (see tele encounter dated 2/07/25).  All questions were answered.     Brenda ARZATE RN

## 2025-02-10 NOTE — TELEPHONE ENCOUNTER
RODERICK Dhaliwal [facility] calling for verbal orders:     Delay in start of care from today tomorrow - 2/11/25    Madhuri - [phone] (okay to [facility])     Thanks   Paula CORDOBA RN  [employer]

## 2025-02-11 ENCOUNTER — PRE VISIT (OUTPATIENT)
Dept: UROLOGY | Facility: CLINIC | Age: 65
End: 2025-02-11
Payer: MEDICARE

## 2025-02-11 DIAGNOSIS — Z09 HOSPITAL DISCHARGE FOLLOW-UP: ICD-10-CM

## 2025-02-11 NOTE — TELEPHONE ENCOUNTER
Reason for visit: New Retention    Relevant information: **C DIFF POSITIVE 1/28/25**, possible TOV, hx of urinary retention,     Records/imaging/labs/orders: CMP done on 2/4/25, UA done on 1/28/25    At Rooming:  Standard rooming      Be Brooke  2/11/2025  8:16 AM

## 2025-02-12 ENCOUNTER — TELEPHONE (OUTPATIENT)
Dept: FAMILY MEDICINE | Facility: CLINIC | Age: 65
End: 2025-02-12
Payer: MEDICARE

## 2025-02-12 NOTE — TELEPHONE ENCOUNTER
Reason for Call:  Home Health Care    Ramirez with [facility] called regarding (reason for call): Verbal Orders    Orders are needed for this patient. Yes SN    PT: N/A    OT: N/A    Skilled Nursing: 3 Times a week For 9 weeks  For Wound Care    Pt Provider: Wegener    Phone Number Homecare Nurse can be reached at: [phone]    Can we leave a detailed message on this number? YES      Call taken on 2/12/2025 at 11:25 AM by Grecia Harmon

## 2025-02-12 NOTE — TELEPHONE ENCOUNTER
Home Care is calling regarding an established patient with [facility].  Requesting orders from: Wegener, Joel Daniel Irwin  RN APPROVED: RN able to provide verbal orders.  Home Care will send orders for signature.  RN will close encounter.  Is this a request for a temporary pause in the home care episode?  No    Orders Requested    Skilled Nursing  Request for initial certification (first set of orders)   Frequency: 3 x/week for 9 weeks related to wound care   RN gave verbal order: Yes      Caller: Ramirez  Home Care Agency: [facility]  Phone number Home Care can be reached at: [phone]     Brenda Ryder RN

## 2025-02-13 NOTE — PROGRESS NOTES
Subjective     REFERRING PROVIDER  Joel Daniel Irwin Wegener, MD    REASON FOR VISIT  Urinary retention    HISTORY OF PRESENT ILLNESS  Mr. Aguilar is a pleasant 64 year old male with a very complex medical history most notable for recent STEMI with VT arrest requiring CPR and requiring ECMO status post CABG in December 2024 as well as cauda equina syndrome following surgery in 2009 who was found to be in urinary retention who presents today for trial of void and discussion of next steps.  I personally reviewed the emergency department note from 12/31/2024, the urology consult note while inpatient during his January 2025 admission, as well as the emergency department note from 1/28/2025, the TCU note from 2/3/2025 in preparation for today's visit, and finally the family practice note from 2/4/2025 in preparation for today's visit.    It appears that after the above-mentioned STEMI leading to CPR, ECMO, and finally CABG, he was found to be in urinary retention and had a catheter placed.  When seen by urology, recommendations were given to start tamsulosin, avoid medications that could worsen the retention, then meet with urology in an outpatient to complete a trial of void and discuss next steps    Today:  Clarifies for me today that he was able to urinate about 800 mL of urine, but was found to have about a liter left in his bladder which is why the catheter was placed  Some history of bladder issues after his induced cauda equina syndrome  No bad bladder spasms since having the catheter placed, though he has no feeling from the waist down so his sensation to urinate is not standard   Normally gets a sensation at the end of his penis that informs him that he needs to urinate  Usually urinates 12-15 times per day but does not really pay attention to how much he empties    Objective     PHYSICAL EXAM  General: Alert, oriented, no acute distress    Assessment & Plan   Urinary retention   Cauda equina syndrome    At the  beginning of the visit, I spoke with Erwin regarding the plan for his trial of void today.  He was very concerned he was not can to be able to empty his bladder right away but after instilling 300 mL of fluid, he was able to get this out within 20 minutes.  However, before I was able to go back in the room and discussed a follow-up plan, he left and refused to  the antibiotic I sent to the pharmacy.  He stated he had a ride coming that he needed to make and could not cancel and did not want to reschedule.    Given that he was able to empty, I would like to see him back for a postvoid residual on the nursing calendar in 1 month to be sure that his bladder continues to empty well.  We will attempt to contact him to get this on the calendar as I was not able to discuss this with him prior to him leaving the clinic.    PLAN  Follow-up PVR on the nursing calendar in ~1 month    Signed by:    Rodriguez Ruelas PA-C

## 2025-02-15 ENCOUNTER — MYC MEDICAL ADVICE (OUTPATIENT)
Dept: FAMILY MEDICINE | Facility: CLINIC | Age: 65
End: 2025-02-15
Payer: MEDICARE

## 2025-02-16 ENCOUNTER — MYC REFILL (OUTPATIENT)
Dept: FAMILY MEDICINE | Facility: CLINIC | Age: 65
End: 2025-02-16
Payer: MEDICARE

## 2025-02-16 DIAGNOSIS — S86.011A RUPTURE OF RIGHT ACHILLES TENDON, INITIAL ENCOUNTER: ICD-10-CM

## 2025-02-16 DIAGNOSIS — M25.571 CHRONIC PAIN OF RIGHT ANKLE: ICD-10-CM

## 2025-02-16 DIAGNOSIS — G89.4 CHRONIC PAIN SYNDROME: ICD-10-CM

## 2025-02-16 DIAGNOSIS — M79.671 INTRACTABLE RIGHT HEEL PAIN: ICD-10-CM

## 2025-02-16 DIAGNOSIS — M21.6X1 ACQUIRED CALCANEUS DEFORMITY OF RIGHT ANKLE: ICD-10-CM

## 2025-02-16 DIAGNOSIS — M65.28 CALCIFIC ACHILLES TENDINITIS: ICD-10-CM

## 2025-02-16 DIAGNOSIS — R11.0 NAUSEA WITHOUT VOMITING: ICD-10-CM

## 2025-02-16 DIAGNOSIS — G89.29 CHRONIC PAIN OF RIGHT ANKLE: ICD-10-CM

## 2025-02-16 DIAGNOSIS — Z98.890 H/O FOOT SURGERY: ICD-10-CM

## 2025-02-16 DIAGNOSIS — K31.84 GASTROPARESIS: ICD-10-CM

## 2025-02-17 ENCOUNTER — MYC MEDICAL ADVICE (OUTPATIENT)
Dept: FAMILY MEDICINE | Facility: CLINIC | Age: 65
End: 2025-02-17

## 2025-02-17 ENCOUNTER — PRE VISIT (OUTPATIENT)
Dept: UROLOGY | Facility: CLINIC | Age: 65
End: 2025-02-17

## 2025-02-17 ENCOUNTER — OFFICE VISIT (OUTPATIENT)
Dept: UROLOGY | Facility: CLINIC | Age: 65
End: 2025-02-17
Payer: MEDICARE

## 2025-02-17 VITALS
OXYGEN SATURATION: 99 % | HEIGHT: 71 IN | BODY MASS INDEX: 23.8 KG/M2 | SYSTOLIC BLOOD PRESSURE: 132 MMHG | WEIGHT: 170 LBS | HEART RATE: 68 BPM | DIASTOLIC BLOOD PRESSURE: 69 MMHG

## 2025-02-17 DIAGNOSIS — N31.9 NEUROGENIC BLADDER: ICD-10-CM

## 2025-02-17 DIAGNOSIS — R33.9 URINARY RETENTION: Primary | ICD-10-CM

## 2025-02-17 PROCEDURE — 51798 US URINE CAPACITY MEASURE: CPT | Performed by: STUDENT IN AN ORGANIZED HEALTH CARE EDUCATION/TRAINING PROGRAM

## 2025-02-17 PROCEDURE — 99204 OFFICE O/P NEW MOD 45 MIN: CPT | Mod: 25 | Performed by: STUDENT IN AN ORGANIZED HEALTH CARE EDUCATION/TRAINING PROGRAM

## 2025-02-17 RX ORDER — OXYCODONE HYDROCHLORIDE 10 MG/1
10 TABLET ORAL 3 TIMES DAILY PRN
Qty: 30 TABLET | Refills: 0 | OUTPATIENT
Start: 2025-02-17

## 2025-02-17 RX ORDER — ONDANSETRON 4 MG/1
4 TABLET, FILM COATED ORAL 2 TIMES DAILY PRN
Qty: 20 TABLET | Refills: 0 | Status: SHIPPED | OUTPATIENT
Start: 2025-02-17

## 2025-02-17 RX ORDER — CEFDINIR 300 MG/1
300 CAPSULE ORAL ONCE
Qty: 1 CAPSULE | Refills: 0 | Status: SHIPPED | OUTPATIENT
Start: 2025-02-17 | End: 2025-02-17

## 2025-02-17 ASSESSMENT — PAIN SCALES - GENERAL: PAINLEVEL_OUTOF10: NO PAIN (0)

## 2025-02-17 NOTE — NURSING NOTE
Erwin Aguilar comes into clinic today at the request of Chad Ruelas with the diagnosis of urinary retention for a TOV. Verified patient identity using patient's name and date of birth.    The patient was instructed to pickup the following medication from the OP pharmacy: Cefdinir tablet x1 was sent to the [facility] outpatient pharmacy. Clear instruction was given to Erwin of why the antibiotic tablet was recommended to take, following instillation of fluid into the urinary bladder. Advised pt to take the antibiotic tablet x1 to avoid urinary tract infection and potential emergency department visits. Patient refused to  this medication upon leaving the clinic.     Pts ca catheter bag was emptied prior to instillation of sterile water: 350cc pink tinged urine with sediment.    Approx 300 mL of sterile water instilled into the bladder via catheter.    Removal:  16 Fr Coude tipped latex ca catheter removed from urethral meatus without difficulty after removing 8 mL of fluid from the balloon, balloon intact.    Patient voided approx 300 mL of clear, yellow urine with small amts of pink/flesh colored sediment 10 minutes after sterile water was instilled. He reported a strong urine stream when emptying.     Post-void residual was: 75 mL per bladder scan.    Patient tolerated procedure well.      Education: Teaching done with patient verbally as to where to go or call if unable to urinate post-catheter removal. Increase fluids.   Plan: Follow-up 1 month per Chad FARRELL    This service provided today was under the supervising provider of the day Chad FARRELL, who was available if needed.    Brandt Kelley RN

## 2025-02-17 NOTE — LETTER
2/17/2025       RE: Erwin Aguilar  [address]  [place] [address]     Dear Colleague,    Thank you for referring your patient, Erwin Aguilar, to the [facility] [place] at . Please see a copy of my visit note below.    Subjective    REFERRING PROVIDER  Joel Daniel Irwin Wegener, MD    REASON FOR VISIT  Urinary retention    HISTORY OF PRESENT ILLNESS  Mr. Aguilar is a pleasant 64 year old male with a very complex medical history most notable for recent STEMI with VT arrest requiring CPR and requiring ECMO status post CABG in December 2024 as well as cauda equina syndrome following surgery in 2009 who was found to be in urinary retention who presents today for trial of void and discussion of next steps.  I personally reviewed the emergency department note from 12/31/2024, the urology consult note while inpatient during his January 2025 admission, as well as the emergency department note from 1/28/2025, the TCU note from 2/3/2025 in preparation for today's visit, and finally the family practice note from 2/4/2025 in preparation for today's visit.    It appears that after the above-mentioned STEMI leading to CPR, ECMO, and finally CABG, he was found to be in urinary retention and had a catheter placed.  When seen by urology, recommendations were given to start tamsulosin, avoid medications that could worsen the retention, then meet with urology in an outpatient to complete a trial of void and discuss next steps    Today:  Clarifies for me today that he was able to urinate about 800 mL of urine, but was found to have about a liter left in his bladder which is why the catheter was placed  Some history of bladder issues after his induced cauda equina syndrome  No bad bladder spasms since having the catheter placed, though he has no feeling from the waist down so his sensation to urinate is not standard   Normally gets a  sensation at the end of his penis that informs him that he needs to urinate  Usually urinates 12-15 times per day but does not really pay attention to how much he empties    Objective    PHYSICAL EXAM  General: Alert, oriented, no acute distress    Assessment & Plan  Urinary retention   Cauda equina syndrome    At the beginning of the visit, I spoke with Erwin regarding the plan for his trial of void today.  He was very concerned he was not can to be able to empty his bladder right away but after instilling 300 mL of fluid, he was able to get this out within 20 minutes.  However, before I was able to go back in the room and discussed a follow-up plan, he left and refused to  the antibiotic I sent to the pharmacy.  He stated he had a ride coming that he needed to make and could not cancel and did not want to reschedule.    Given that he was able to empty, I would like to see him back for a postvoid residual on the nursing calendar in 1 month to be sure that his bladder continues to empty well.  We will attempt to contact him to get this on the calendar as I was not able to discuss this with him prior to him leaving the clinic.    PLAN  Follow-up PVR on the nursing calendar in ~1 month    Signed by:    Rodriguez Ruelas PA-C      Again, thank you for allowing me to participate in the care of your patient.      Sincerely,    Rodriguez Ruelas PA-C

## 2025-02-17 NOTE — TELEPHONE ENCOUNTER
JW,  Please see below [employer] message and advise.  Thanks,  Roslyn MORFIN RN

## 2025-02-17 NOTE — PROGRESS NOTES
CARDIOTHORACIC SURGERY FOLLOW-UP VISIT     Erwin Aguilar   1960   [id number]      Reason for visit: Post-op clinic visit    Date: 1/2/2025.  Surgeon: Dr. Chad Rosenbaum   1.  Coronary artery bypass grafting x 4       -reversed saphenous vein graft to the right posterior descending coronary artery      -reversed saphenous vein graft to the obtuse marginal branch of the left circumflex coronary artery      -reversed saphenous vein graft to the ramus intermedius coronary artery      -in-situ left internal mammary artery to the left anterior descending coronary artery.  2.  Endoscopic harvest of the greater saphenous vein from the left lower extremity.  3.  Left atrial appendage ligation with a 50mm Atriclip  4.  Left atrial ablation with the Atricure Encompass clamp     Date: 1/3/2025.  Surgeon: Dr. Chad Rosenbaum   1.  VA-ECMO decannulation (right femoral artery / right femoral vein)  2.  Primary repair of right femoral artery cannulation site.  3.  Primary repair of right femoral venous cannulation site    ASSESSMENT/PLAN:  Erwin Aguilar is a 64 year old male who is status post CABG x4 and ECMO decannulation with Dr. Rosenbaum.    Patient Active Problem List   Diagnosis    Generalized anxiety disorder    Generalized muscle weakness    Type 2 diabetes, HbA1c goal < 7% (H)    Hyperlipidemia LDL goal <100    Hypertension goal BP (blood pressure) < 140/90    Major depressive disorder, recurrent episode, mild    Chronic pain syndrome    GERD (gastroesophageal reflux disease)    Abnormal liver function tests    Abnormal EMG    Hernia    Diastasis recti    Hypokalemia    Wound infection    Cellulitis    Nausea without vomiting    Atopic dermatitis    Muscle spasms of Upper extremity    Edema of left lower extremity    Anemia due to blood loss, acute    Skin infection    Superficial thrombophlebitis of arm    Cellulitis of hand    Ganglion cyst    Dry skin dermatitis    Moderate persistent asthma    Fall    Closed fracture of right  patella    Right knee pain    Alcoholic cirrhosis of liver without ascites (H)    Lumbosacral radiculopathy    Calculus of gallbladder without cholecystitis without obstruction    Type 2 diabetes mellitus without complication, with long-term current use of insulin (H)    Wound, open, buttock, right    Type 2 diabetes mellitus with diabetic polyneuropathy, with long-term current use of insulin (H)    Benign neoplasm of skin of trunk, except scrotum    Hematemesis with nausea    Osteoarthritis of lumbar spine, unspecified spinal osteoarthritis complication status    S/P insertion of spinal cord stimulator    Gastroparesis    Alcoholism in remission (H)    Right ankle pain    Acquired calcaneus deformity of right ankle    H/O foot surgery    Calcific Achilles tendinitis    Paroxysmal atrial fibrillation (H)    Adjustment disorder with anxious mood    Psychophysiological insomnia    At risk for prolonged QT interval syndrome    Cubital tunnel syndrome on left    Left carpal tunnel syndrome    Carpal tunnel syndrome of left wrist    Transient ischemic attack (TIA)    Stress    Physical deconditioning    Noninfectious gastroenteritis and colitis    Neuropathy in diabetes (H)    Neuropathic pain syndrome (non-herpetic)    Neurogenic bowel    Neurogenic bladder    Gait abnormality    Foot-drop    CRPS (complex regional pain syndrome type I)    Contact with and (suspected) exposure to covid-19    Chronic pain of left thumb    Orthopedic aftercare    PVD (peripheral vascular disease)    PAD (peripheral artery disease)    Atherosclerosis of native arteries of extremities with rest pain, other extremity (H)    Surgery, elective    Coronary artery disease involving native coronary artery of native heart with angina pectoris    Wound dehiscence    Nonhealing surgical wound    ST elevation MI (STEMI) (H)    Generalized abdominal pain    Nausea vomiting and diarrhea    S/P below knee amputation, right (H)    Morbid obesity (H)     S/P CABG (coronary artery bypass graft)        Surgically doing ***. Pain is overall controlled***. Midline sternal incision healing well without signs of infection. Increasing activity and strength.  Sounds to be in a *** regular rhythm with HR <100 bpm***.  Appears *** euvolemic.    Hemodynamics are stable. Medications reviewed and no*** changes were needed today.  Follow up with your PCP (you need to arrange appt for first available appointment) and cardiologist (2/26/25; Dawit Fox MD) as scheduled.  Chest CT scan:  substernal fluid collection while inpatient, reordered Chest CT*** in clinic today, will need to have this scheduled soon.   Post-op A-fibrillation: he is s/p PAULETTE clipping and not on systemic anticoagulation due to presence of substernal fluid collection prior to discharge. CVTS will defer use of systemic anticoagulation to cardiology team follow-up, it is reasonable to restart if CT Chest shows improved/stable substernal fluid collection.   Right Groin ECMO site open wound: ***  Extended Rodriguez Catheter use: catheter removed 2/17*** and passed void trial in Urology clinic. Follow up with Urology with any further questions or difficulty urinating.  Continue Outpatient Cardiac Rehab until completed.   Continue sternal precautions for 12 weeks from surgery date.   ***    Postoperative restrictions have been reviewed and all of the patient's questions were answered. Our contact information was given to the patient if he should have any further questions/concerns. No further follow up is needed with us.  The total time spent with the patient was 25*** minutes, > 50% of which was spent in counseling and coordination of care.    Carlos Crain PA-C  Cardiothoracic Surgery  __________________________________________________________________________________    HPI:   Erwin Aguilar is a 64 year old male with a PMH of DM2, HTN, PVD, and neuropathic pain who initially presented on 12/31/24 to the [facility] ED  with arm pain, vomiting, and diarrhea; found to have ACS and ST depression. He subsequently had VT arrest with 1 round of CPR, epinephrine, defibrillation. After ROSC, he was found to be in A-fib with RVR, due to hypotensive he was subsequently cannulated for VA ECMO. Workup revealed multivessel disease and he underwent 4 vessel CABG on 1/2/25 with Dr. Rosenbaum. ECMO decannulated on 1/3/25 at bedside. ICU course complicated by large R PTX seen on chest CT on 1/4/25 s/p bedside CT placement, VAP, and encephalopathy. Extubated 1/11/25.      Hospital course was remarkable for:  1. CAD with acute coronary syndrome (STEMI) with refractory VT arrest in the ED with CPR and ROSC  2. Unstable hemodynamics requiring placement on VA ECMO  3. Chronic Hx of Atrial fibrillation on DOAC, pre-op Atrial fibrillation  4. S/P 4 vessel CABG with MAZE procedure and left atrial appendage clip  5. Substernal fluid collection, Apixaban held since 1/20/25  6. Aspiration PNA: Klebsiella pneumoniae   7. Right groin incision superficial dehiscence, wet to dry packing until closed   8. Urinary Retention, Rodriguez in place (intermittent hematuria, stable). Need Voiding Trial 1/30/25, Urology consult if unable to void.   9. Prolonged QTC, stable  10. Right hemidiaphragm elevation  11. Post-op encephalopathy, resolved  12. AUSTIN, resolved   13. Dysphagia, resolved  14. Severe malnutrition in context of acute on chronic illness  15. Substernal fluid collection, hematoma vs seroma. HOLDING systemic anticoagulation.    Patient was discharged to inpatient rehab on 1/25/25 and discharged to home 2/7/2025.    Since discharge, has been recovering well ***.  ***States pain is ***. Patient continues to need *** for pain management. Sleep is ***. Participating in therapy at ***.   Breathing has been ***stable/improving. Continues to work on C&DB, able to reach *** on IS. Denies SOB at rest, PND, orthopnea. Non-productive ***cough.   Patient endorses  "{UMPCARDFEVER:[id number]}. Patient denies any {UMPCARDFEVER:[id number]}.    Patient {isnot:519809::\"is\"} on Coumadin and INR {isnot:777672::\"is\"} therapeutic.   Blood glucose levels {HAVE:038868} been under good control.  *** Has had a *** appetite. Having ***regular bowel movements and ***voiding without difficulty.   Denies *** sternal popping or clicking or ***incisional drainage/erythema. ***No psychiatric concerns.    ROS:  Review of symptoms otherwise negative unless commented about in HPI.     LABS:    CBC RESULTS:   Recent Labs   Lab Test 02/04/25  1052 01/28/25  0315 01/25/25  0411   WBC 9.0 9.6 6.8   HGB 11.2* 11.8* 9.3*   HCT 35.4* 37.1* 30.0*    219 222       CMP RESULTS:  Recent Labs   Lab Test 02/04/25  1052 01/28/25  1446 01/28/25  0947 01/28/25  0315 01/25/25  0738 01/25/25  0411 01/04/25  0258 01/04/25  0216 01/03/25  0340 01/03/25  0336   *  --   --  137  --  142   < > 145   < > 147*  147*   POTASSIUM 5.0  --   --  3.5  --  3.7   < > 3.5   < > 4.4  4.4   CHLORIDE 99  --   --  100  --  107   < > 109*   < > 112*  112*   CO2 23  --   --  23  --  27   < > 20*   < > 28  28   ANIONGAP 12  --   --  14  --  8   < > 16*   < > 7  7   * 117* 137* 174*   < > 122*   < > 173*   < > 145*  145*   BUN 8.4  --   --  11.8  --  8.0   < > 37.8*   < > 15.9  15.9   CR 0.57*  --   --  0.58*  --  0.60*   < > 1.57*   < > 0.95  0.95   GFRESTIMATED >90  --   --  >90  --  >90   < > 49*   < > 89  89   [place] 9.3  --   --  8.8  --  8.4*   < > 8.1*   < > 8.5*  8.5*   BILITOTAL 0.3  --   --  0.6  --   --   --  0.7  --  0.5   ALKPHOS 218*  --   --  268*  --   --   --  66  --  54   ALT 43  --   --  33  --   --   --  213*  --  65   AST  --   --   --  31  --   --   --  472*  --  165*    < > = values in this interval not displayed.     Recent Labs   Lab Test 01/28/25  0315 01/22/25  0413 01/03/25  0336 01/02/25 2155 01/02/25 2024   INR 1.16* 1.37* 1.29* 1.42* 1.43*       IMAGING:   Chest CT Scan 2/18/25-  *** " Not completed    PHYSICAL EXAM:   There were no vitals taken for this visit.***    General: alert and oriented x 3, pleasant, no acute distress, normal mood and affect  Neuro: ***no focal deficits   CV: S1 S2, no murmurs, rubs or gallops, regular rate and rhythm, no peripheral edema  Pulm: bilateral breath sounds, clear to auscultation, easy work of breathing  Sternum and Chest tubes sites: *** clean dry and intact without erythema, swelling or drainage  Right Groin: ***    PROCEDURES: *** None       CURRENT MEDICATIONS:   Current Outpatient Medications   Medication Sig Dispense Refill    acetaminophen (TYLENOL) 325 MG tablet Take 2 tablets (650 mg) by mouth every 8 hours as needed for mild pain.      albuterol (PROAIR HFA/PROVENTIL HFA/VENTOLIN HFA) 108 (90 Base) MCG/ACT inhaler INHALE 2 PUFFS BY MOUTH EVERY 6 HOURS AS NEEDED FOR SHORTNESS OF BREATH OR WHEEZING 18 g 0    aspirin (ASA) 81 MG chewable tablet Take 1 tablet (81 mg) by mouth daily. 30 tablet 0    baclofen (LIORESAL) 10 MG tablet Take 1 tablet (10 mg) by mouth 3 times daily as needed for muscle spasms. 20 tablet 0    blood glucose (NO BRAND SPECIFIED) test strip Use to test blood sugar 3 times daily or as directed. To accompany: Blood Glucose Monitor Brands: per insurance. 100 strip 2    blood glucose monitoring (NO BRAND SPECIFIED) meter device kit Use to test blood sugar 3 times daily or as directed. Preferred blood glucose meter OR supplies to accompany: Blood Glucose Monitor Brands: per insurance. 1 kit 0    calcium carbonate (TUMS) 500 MG chewable tablet Take 1 tablet (500 mg) by mouth 3 times daily as needed for heartburn. 30 tablet 0    cefdinir (OMNICEF) 300 MG capsule Take 1 capsule (300 mg) by mouth once for 1 dose. 1 capsule 0    gabapentin (NEURONTIN) 300 MG capsule Give gabapentin 300mg at 1600 and take 3 tablets (900mg) daily at Hs 60 capsule 0    glucose 40 % (400 mg/mL) gel Take 15-30 g by mouth every 15 minutes as needed for low blood  sugar. 37.5 mL 0    insulin glargine (LANTUS PEN) 100 UNIT/ML pen Inject 7 Units subcutaneously at bedtime. HOLD if Blood Glucose<120 3 mL 0    losartan (COZAAR) 25 MG tablet Take 1 tablet (25 mg) by mouth daily. HOLD if SBP<100 30 tablet 0    medical cannabis (Patient's own supply) See Admin Instructions (The purpose of this order is to document that the patient reports taking medical cannabis.  This is not a prescription, and is not used to certify that the patient has a qualifying medical condition.)   Flower - PRN      methocarbamol (ROBAXIN) 500 MG tablet Take 1 tablet (500 mg) by mouth 3 times daily as needed for muscle spasms. 30 tablet 0    metoprolol tartrate (LOPRESSOR) 50 MG tablet Take 1 tablet (50 mg) by mouth 2 times daily. HOLD if SBP<100 and/or HR<55 60 tablet 0    multivitamin, therapeutic (THERA-VIT) TABS tablet Take 1 tablet by mouth daily. 30 tablet 0    ondansetron (ZOFRAN) 4 MG tablet Take 1 tablet (4 mg) by mouth 2 times daily as needed for nausea. 20 tablet 0    oxyCODONE IR (ROXICODONE) 10 MG tablet Take 1 tablet (10 mg) by mouth 3 times daily as needed for severe pain. 30 tablet 0    pantoprazole (PROTONIX) 40 MG EC tablet Take 1 tablet (40 mg) by mouth daily. 30 tablet 0    ramelteon (ROZEREM) 8 MG tablet Take 1 tablet (8 mg) by mouth at bedtime. 30 tablet 0    rosuvastatin (CRESTOR) 40 MG tablet Take 1 tablet (40 mg) by mouth daily. 30 tablet 0    senna-docusate (SENOKOT-S/PERICOLACE) 8.6-50 MG tablet Take 1 tablet by mouth or Feeding Tube 2 times daily as needed for constipation. 30 tablet 0    sertraline (ZOLOFT) 100 MG tablet TAKE 2 TABLETS BY MOUTH DAILY 60 tablet 0    tamsulosin (FLOMAX) 0.4 MG capsule Take 1 capsule (0.4 mg) by mouth daily. 30 capsule 0    thiamine (B-1) 100 MG tablet Take 1 tablet (100 mg) by mouth daily. 30 tablet 0    thin (NO BRAND SPECIFIED) lancets Use with lanceting device. To accompany: Blood Glucose Monitor Brands: per insurance. 100 each 2     triamterene-HCTZ (DYAZIDE) 37.5-25 MG capsule Take 1 capsule by mouth every morning. HOLD if SBP<100 30 capsule 0     No current facility-administered medications for this visit.       CC  Joel Daniel Wegener

## 2025-02-17 NOTE — NURSING NOTE
"Chief Complaint   Patient presents with    Consult     New Retention       Blood pressure 132/69, pulse 68, height 1.803 m (5' 11\"), weight 77.1 kg (170 lb), SpO2 99%. Body mass index is 23.71 kg/m .    Patient Active Problem List   Diagnosis    Generalized anxiety disorder    Generalized muscle weakness    Type 2 diabetes, HbA1c goal < 7% (H)    Hyperlipidemia LDL goal <100    Hypertension goal BP (blood pressure) < 140/90    Major depressive disorder, recurrent episode, mild    Chronic pain syndrome    GERD (gastroesophageal reflux disease)    Abnormal liver function tests    Abnormal EMG    Hernia    Diastasis recti    Hypokalemia    Wound infection    Cellulitis    Nausea without vomiting    Atopic dermatitis    Muscle spasms of Upper extremity    Edema of left lower extremity    Anemia due to blood loss, acute    Skin infection    Superficial thrombophlebitis of arm    Cellulitis of hand    Ganglion cyst    Dry skin dermatitis    Moderate persistent asthma    Fall    Closed fracture of right patella    Right knee pain    Alcoholic cirrhosis of liver without ascites (H)    Lumbosacral radiculopathy    Calculus of gallbladder without cholecystitis without obstruction    Type 2 diabetes mellitus without complication, with long-term current use of insulin (H)    Wound, open, buttock, right    Type 2 diabetes mellitus with diabetic polyneuropathy, with long-term current use of insulin (H)    Benign neoplasm of skin of trunk, except scrotum    Hematemesis with nausea    Osteoarthritis of lumbar spine, unspecified spinal osteoarthritis complication status    S/P insertion of spinal cord stimulator    Gastroparesis    Alcoholism in remission (H)    Right ankle pain    Acquired calcaneus deformity of right ankle    H/O foot surgery    Calcific Achilles tendinitis    Paroxysmal atrial fibrillation (H)    Adjustment disorder with anxious mood    Psychophysiological insomnia    At risk for prolonged QT interval syndrome    " Cubital tunnel syndrome on left    Left carpal tunnel syndrome    Carpal tunnel syndrome of left wrist    Transient ischemic attack (TIA)    Stress    Physical deconditioning    Noninfectious gastroenteritis and colitis    Neuropathy in diabetes (H)    Neuropathic pain syndrome (non-herpetic)    Neurogenic bowel    Neurogenic bladder    Gait abnormality    Foot-drop    CRPS (complex regional pain syndrome type I)    Contact with and (suspected) exposure to covid-19    Chronic pain of left thumb    Orthopedic aftercare    PVD (peripheral vascular disease)    PAD (peripheral artery disease)    Atherosclerosis of native arteries of extremities with rest pain, other extremity (H)    Surgery, elective    Coronary artery disease involving native coronary artery of native heart with angina pectoris    Wound dehiscence    Nonhealing surgical wound    ST elevation MI (STEMI) (H)    Generalized abdominal pain    Nausea vomiting and diarrhea    S/P below knee amputation, right (H)    Morbid obesity (H)    S/P CABG (coronary artery bypass graft)       Allergies   Allergen Reactions    Levaquin Anaphylaxis and Rash     Swelling in lip and tongue felt thick    Lisinopril Anaphylaxis     Swollen tongue; inability to swallow; drooling; hives; swollen face, neck, angioedema    Acetaminophen      Hx of cirrhosis     Amlodipine      Swelling, hives, possible angioedema      Morphine      b/p dropped and arms went numb  Hypotension    Quinolones Hives    Spironolactone Unknown     Pt believes himself to be allergic to it; cannot find his old records of this.-mjc     Bactrim [Sulfamethoxazole-Trimethoprim] Rash       Current Outpatient Medications   Medication Sig Dispense Refill    acetaminophen (TYLENOL) 325 MG tablet Take 2 tablets (650 mg) by mouth every 8 hours as needed for mild pain.      albuterol (PROAIR HFA/PROVENTIL HFA/VENTOLIN HFA) 108 (90 Base) MCG/ACT inhaler INHALE 2 PUFFS BY MOUTH EVERY 6 HOURS AS NEEDED FOR SHORTNESS  OF BREATH OR WHEEZING 18 g 0    aspirin (ASA) 81 MG chewable tablet Take 1 tablet (81 mg) by mouth daily. 30 tablet 0    baclofen (LIORESAL) 10 MG tablet Take 1 tablet (10 mg) by mouth 3 times daily as needed for muscle spasms. 20 tablet 0    blood glucose (NO BRAND SPECIFIED) test strip Use to test blood sugar 3 times daily or as directed. To accompany: Blood Glucose Monitor Brands: per insurance. 100 strip 2    blood glucose monitoring (NO BRAND SPECIFIED) meter device kit Use to test blood sugar 3 times daily or as directed. Preferred blood glucose meter OR supplies to accompany: Blood Glucose Monitor Brands: per insurance. 1 kit 0    calcium carbonate (TUMS) 500 MG chewable tablet Take 1 tablet (500 mg) by mouth 3 times daily as needed for heartburn. 30 tablet 0    gabapentin (NEURONTIN) 300 MG capsule Give gabapentin 300mg at 1600 and take 3 tablets (900mg) daily at Hs 60 capsule 0    glucose 40 % (400 mg/mL) gel Take 15-30 g by mouth every 15 minutes as needed for low blood sugar. 37.5 mL 0    insulin glargine (LANTUS PEN) 100 UNIT/ML pen Inject 7 Units subcutaneously at bedtime. HOLD if Blood Glucose<120 3 mL 0    losartan (COZAAR) 25 MG tablet Take 1 tablet (25 mg) by mouth daily. HOLD if SBP<100 30 tablet 0    medical cannabis (Patient's own supply) See Admin Instructions (The purpose of this order is to document that the patient reports taking medical cannabis.  This is not a prescription, and is not used to certify that the patient has a qualifying medical condition.)   Flower - PRN      methocarbamol (ROBAXIN) 500 MG tablet Take 1 tablet (500 mg) by mouth 3 times daily as needed for muscle spasms. 30 tablet 0    metoprolol tartrate (LOPRESSOR) 50 MG tablet Take 1 tablet (50 mg) by mouth 2 times daily. HOLD if SBP<100 and/or HR<55 60 tablet 0    multivitamin, therapeutic (THERA-VIT) TABS tablet Take 1 tablet by mouth daily. 30 tablet 0    ondansetron (ZOFRAN) 4 MG tablet Take 1 tablet (4 mg) by mouth 2  times daily as needed for nausea. 20 tablet 0    oxyCODONE IR (ROXICODONE) 10 MG tablet Take 1 tablet (10 mg) by mouth 3 times daily as needed for severe pain. 30 tablet 0    pantoprazole (PROTONIX) 40 MG EC tablet Take 1 tablet (40 mg) by mouth daily. 30 tablet 0    ramelteon (ROZEREM) 8 MG tablet Take 1 tablet (8 mg) by mouth at bedtime. 30 tablet 0    rosuvastatin (CRESTOR) 40 MG tablet Take 1 tablet (40 mg) by mouth daily. 30 tablet 0    senna-docusate (SENOKOT-S/PERICOLACE) 8.6-50 MG tablet Take 1 tablet by mouth or Feeding Tube 2 times daily as needed for constipation. 30 tablet 0    sertraline (ZOLOFT) 100 MG tablet TAKE 2 TABLETS BY MOUTH DAILY 60 tablet 0    tamsulosin (FLOMAX) 0.4 MG capsule Take 1 capsule (0.4 mg) by mouth daily. 30 capsule 0    thiamine (B-1) 100 MG tablet Take 1 tablet (100 mg) by mouth daily. 30 tablet 0    thin (NO BRAND SPECIFIED) lancets Use with lanceting device. To accompany: Blood Glucose Monitor Brands: per insurance. 100 each 2    triamterene-HCTZ (DYAZIDE) 37.5-25 MG capsule Take 1 capsule by mouth every morning. HOLD if SBP<100 30 capsule 0       Social History     Tobacco Use    Smoking status: Former     Current packs/day: 0.00     Types: Cigarettes     Start date: 10/13/1983     Quit date: 1984     Years since quittin.7     Passive exposure: Past    Smokeless tobacco: Never   Vaping Use    Vaping status: Every Day    Substances: THC, CBD    Devices: [employer]   Substance Use Topics    Alcohol use: Not Currently     Comment: - last drink was 3/28/14    Drug use: Yes     Frequency: 2.0 times per week     Types: Marijuana     Comment: medical marijuana - vape daily       Solo Vang  2025  1:08 PM

## 2025-02-18 ENCOUNTER — OFFICE VISIT (OUTPATIENT)
Dept: CARDIOLOGY | Facility: CLINIC | Age: 65
End: 2025-02-18
Attending: STUDENT IN AN ORGANIZED HEALTH CARE EDUCATION/TRAINING PROGRAM
Payer: MEDICARE

## 2025-02-18 DIAGNOSIS — Z95.1 S/P CABG (CORONARY ARTERY BYPASS GRAFT): Primary | ICD-10-CM

## 2025-02-18 NOTE — Clinical Note
2/18/2025      RE: Erwin Aguilar  [address]  [place] [address]       Dear Colleague,    Thank you for the opportunity to participate in the care of your patient, Erwin Aguilar, at the [facility]. Please see a copy of my visit note below.    CARDIOTHORACIC SURGERY FOLLOW-UP VISIT     Erwin Aguilar   1960   [id number]      Reason for visit: Post-op clinic visit    Date: 1/2/2025.  Surgeon: Dr. Chad Rosenbaum   1.  Coronary artery bypass grafting x 4       -reversed saphenous vein graft to the right posterior descending coronary artery      -reversed saphenous vein graft to the obtuse marginal branch of the left circumflex coronary artery      -reversed saphenous vein graft to the ramus intermedius coronary artery      -in-situ left internal mammary artery to the left anterior descending coronary artery.  2.  Endoscopic harvest of the greater saphenous vein from the left lower extremity.  3.  Left atrial appendage ligation with a 50mm Atriclip  4.  Left atrial ablation with the Atricure Encompass clamp     Date: 1/3/2025.  Surgeon: Dr. Chad Rosenbaum   1.  VA-ECMO decannulation (right femoral artery / right femoral vein)  2.  Primary repair of right femoral artery cannulation site.  3.  Primary repair of right femoral venous cannulation site    ASSESSMENT/PLAN:  Erwin Aguilar is a 64 year old male who is status post CABG x4 and ECMO decannulation with Dr. Rosenbaum.    Patient Active Problem List   Diagnosis    Generalized anxiety disorder    Generalized muscle weakness    Type 2 diabetes, HbA1c goal < 7% (H)    Hyperlipidemia LDL goal <100    Hypertension goal BP (blood pressure) < 140/90    Major depressive disorder, recurrent episode, mild    Chronic pain syndrome    GERD (gastroesophageal reflux disease)    Abnormal liver function tests    Abnormal EMG    Hernia    Diastasis recti    Hypokalemia    Wound infection     Cellulitis    Nausea without vomiting    Atopic dermatitis    Muscle spasms of Upper extremity    Edema of left lower extremity    Anemia due to blood loss, acute    Skin infection    Superficial thrombophlebitis of arm    Cellulitis of hand    Ganglion cyst    Dry skin dermatitis    Moderate persistent asthma    Fall    Closed fracture of right patella    Right knee pain    Alcoholic cirrhosis of liver without ascites (H)    Lumbosacral radiculopathy    Calculus of gallbladder without cholecystitis without obstruction    Type 2 diabetes mellitus without complication, with long-term current use of insulin (H)    Wound, open, buttock, right    Type 2 diabetes mellitus with diabetic polyneuropathy, with long-term current use of insulin (H)    Benign neoplasm of skin of trunk, except scrotum    Hematemesis with nausea    Osteoarthritis of lumbar spine, unspecified spinal osteoarthritis complication status    S/P insertion of spinal cord stimulator    Gastroparesis    Alcoholism in remission (H)    Right ankle pain    Acquired calcaneus deformity of right ankle    H/O foot surgery    Calcific Achilles tendinitis    Paroxysmal atrial fibrillation (H)    Adjustment disorder with anxious mood    Psychophysiological insomnia    At risk for prolonged QT interval syndrome    Cubital tunnel syndrome on left    Left carpal tunnel syndrome    Carpal tunnel syndrome of left wrist    Transient ischemic attack (TIA)    Stress    Physical deconditioning    Noninfectious gastroenteritis and colitis    Neuropathy in diabetes (H)    Neuropathic pain syndrome (non-herpetic)    Neurogenic bowel    Neurogenic bladder    Gait abnormality    Foot-drop    CRPS (complex regional pain syndrome type I)    Contact with and (suspected) exposure to covid-19    Chronic pain of left thumb    Orthopedic aftercare    PVD (peripheral vascular disease)    PAD (peripheral artery disease)    Atherosclerosis of native arteries of extremities with rest  pain, other extremity (H)    Surgery, elective    Coronary artery disease involving native coronary artery of native heart with angina pectoris    Wound dehiscence    Nonhealing surgical wound    ST elevation MI (STEMI) (H)    Generalized abdominal pain    Nausea vomiting and diarrhea    S/P below knee amputation, right (H)    Morbid obesity (H)    S/P CABG (coronary artery bypass graft)        Surgically doing ***. Pain is overall controlled***. Midline sternal incision healing well without signs of infection. Increasing activity and strength.  Sounds to be in a *** regular rhythm with HR <100 bpm***.  Appears *** euvolemic.    Hemodynamics are stable. Medications reviewed and no*** changes were needed today.  Follow up with your PCP (you need to arrange appt for first available appointment) and cardiologist (2/26/25; Dawit Fox MD) as scheduled.  Chest CT scan:  substernal fluid collection while inpatient, reordered Chest CT*** in clinic today, will need to have this scheduled soon.   Post-op A-fibrillation: he is s/p PAULETTE clipping and not on systemic anticoagulation due to presence of substernal fluid collection prior to discharge. CVTS will defer use of systemic anticoagulation to cardiology team follow-up, it is reasonable to restart if CT Chest shows improved/stable substernal fluid collection.   Right Groin ECMO site open wound: ***  Extended Rodriguez Catheter use: catheter removed 2/17*** and passed void trial in Urology clinic. Follow up with Urology with any further questions or difficulty urinating.  Continue Outpatient Cardiac Rehab until completed.   Continue sternal precautions for 12 weeks from surgery date.   ***    Postoperative restrictions have been reviewed and all of the patient's questions were answered. Our contact information was given to the patient if he should have any further questions/concerns. No further follow up is needed with us.  The total time spent with the patient was 25***  minutes, > 50% of which was spent in counseling and coordination of care.    Carlos Crain PA-C  Cardiothoracic Surgery  __________________________________________________________________________________    HPI:   Erwin Aguilar is a 64 year old male with a PMH of DM2, HTN, PVD, and neuropathic pain who initially presented on 12/31/24 to the [facility] ED with arm pain, vomiting, and diarrhea; found to have ACS and ST depression. He subsequently had VT arrest with 1 round of CPR, epinephrine, defibrillation. After ROSC, he was found to be in A-fib with RVR, due to hypotensive he was subsequently cannulated for VA ECMO. Workup revealed multivessel disease and he underwent 4 vessel CABG on 1/2/25 with Dr. Rosenbaum. ECMO decannulated on 1/3/25 at bedside. ICU course complicated by large R PTX seen on chest CT on 1/4/25 s/p bedside CT placement, VAP, and encephalopathy. Extubated 1/11/25.      Hospital course was remarkable for:  1. CAD with acute coronary syndrome (STEMI) with refractory VT arrest in the ED with CPR and ROSC  2. Unstable hemodynamics requiring placement on VA ECMO  3. Chronic Hx of Atrial fibrillation on DOAC, pre-op Atrial fibrillation  4. S/P 4 vessel CABG with MAZE procedure and left atrial appendage clip  5. Substernal fluid collection, Apixaban held since 1/20/25  6. Aspiration PNA: Klebsiella pneumoniae   7. Right groin incision superficial dehiscence, wet to dry packing until closed   8. Urinary Retention, Rodriguez in place (intermittent hematuria, stable). Need Voiding Trial 1/30/25, Urology consult if unable to void.   9. Prolonged QTC, stable  10. Right hemidiaphragm elevation  11. Post-op encephalopathy, resolved  12. AUSTIN, resolved   13. Dysphagia, resolved  14. Severe malnutrition in context of acute on chronic illness  15. Substernal fluid collection, hematoma vs seroma. HOLDING systemic anticoagulation.    Patient was discharged to inpatient rehab on 1/25/25 and discharged to home 2/7/2025.    Since  "discharge, has been recovering well ***.  ***States pain is ***. Patient continues to need *** for pain management. Sleep is ***. Participating in therapy at ***.   Breathing has been ***stable/improving. Continues to work on C&DB, able to reach *** on IS. Denies SOB at rest, PND, orthopnea. Non-productive ***cough.   Patient endorses {UMPCARDFEVER:310177014}. Patient denies any {UMPCARDFEVER:571241398}.    Patient {isnot:645806::\"is\"} on Coumadin and INR {isnot:356770::\"is\"} therapeutic.   Blood glucose levels {HAVE:603681} been under good control.  *** Has had a *** appetite. Having ***regular bowel movements and ***voiding without difficulty.   Denies *** sternal popping or clicking or ***incisional drainage/erythema. ***No psychiatric concerns.    ROS:  Review of symptoms otherwise negative unless commented about in HPI.     LABS:    CBC RESULTS:   Recent Labs   Lab Test 02/04/25  1052 01/28/25  0315 01/25/25  0411   WBC 9.0 9.6 6.8   HGB 11.2* 11.8* 9.3*   HCT 35.4* 37.1* 30.0*    219 222       CMP RESULTS:  Recent Labs   Lab Test 02/04/25  1052 01/28/25  1446 01/28/25  0947 01/28/25  0315 01/25/25  0738 01/25/25  0411 01/04/25  0258 01/04/25  0216 01/03/25  0340 01/03/25  0336   *  --   --  137  --  142   < > 145   < > 147*  147*   POTASSIUM 5.0  --   --  3.5  --  3.7   < > 3.5   < > 4.4  4.4   CHLORIDE 99  --   --  100  --  107   < > 109*   < > 112*  112*   CO2 23  --   --  23  --  27   < > 20*   < > 28  28   ANIONGAP 12  --   --  14  --  8   < > 16*   < > 7  7   * 117* 137* 174*   < > 122*   < > 173*   < > 145*  145*   BUN 8.4  --   --  11.8  --  8.0   < > 37.8*   < > 15.9  15.9   CR 0.57*  --   --  0.58*  --  0.60*   < > 1.57*   < > 0.95  0.95   GFRESTIMATED >90  --   --  >90  --  >90   < > 49*   < > 89  89   A[place] 9.3  --   --  8.8  --  8.4*   < > 8.1*   < > 8.5*  8.5*   BILITOTAL 0.3  --   --  0.6  --   --   --  0.7  --  0.5   ALKPHOS 218*  --   --  268*  --   --   --  66  --  " 54   ALT 43  --   --  33  --   --   --  213*  --  65   AST  --   --   --  31  --   --   --  472*  --  165*    < > = values in this interval not displayed.     Recent Labs   Lab Test 01/28/25  0315 01/22/25  0413 01/03/25  0336 01/02/25  2155 01/02/25 2024   INR 1.16* 1.37* 1.29* 1.42* 1.43*       IMAGING:   Chest CT Scan 2/18/25-  *** Not completed    PHYSICAL EXAM:   There were no vitals taken for this visit.***    General: alert and oriented x 3, pleasant, no acute distress, normal mood and affect  Neuro: ***no focal deficits   CV: S1 S2, no murmurs, rubs or gallops, regular rate and rhythm, no peripheral edema  Pulm: bilateral breath sounds, clear to auscultation, easy work of breathing  Sternum and Chest tubes sites: *** clean dry and intact without erythema, swelling or drainage  Right Groin: ***    PROCEDURES: *** None       CURRENT MEDICATIONS:   Current Outpatient Medications   Medication Sig Dispense Refill    acetaminophen (TYLENOL) 325 MG tablet Take 2 tablets (650 mg) by mouth every 8 hours as needed for mild pain.      albuterol (PROAIR HFA/PROVENTIL HFA/VENTOLIN HFA) 108 (90 Base) MCG/ACT inhaler INHALE 2 PUFFS BY MOUTH EVERY 6 HOURS AS NEEDED FOR SHORTNESS OF BREATH OR WHEEZING 18 g 0    aspirin (ASA) 81 MG chewable tablet Take 1 tablet (81 mg) by mouth daily. 30 tablet 0    baclofen (LIORESAL) 10 MG tablet Take 1 tablet (10 mg) by mouth 3 times daily as needed for muscle spasms. 20 tablet 0    blood glucose (NO BRAND SPECIFIED) test strip Use to test blood sugar 3 times daily or as directed. To accompany: Blood Glucose Monitor Brands: per insurance. 100 strip 2    blood glucose monitoring (NO BRAND SPECIFIED) meter device kit Use to test blood sugar 3 times daily or as directed. Preferred blood glucose meter OR supplies to accompany: Blood Glucose Monitor Brands: per insurance. 1 kit 0    calcium carbonate (TUMS) 500 MG chewable tablet Take 1 tablet (500 mg) by mouth 3 times daily as needed for  heartburn. 30 tablet 0    cefdinir (OMNICEF) 300 MG capsule Take 1 capsule (300 mg) by mouth once for 1 dose. 1 capsule 0    gabapentin (NEURONTIN) 300 MG capsule Give gabapentin 300mg at 1600 and take 3 tablets (900mg) daily at Hs 60 capsule 0    glucose 40 % (400 mg/mL) gel Take 15-30 g by mouth every 15 minutes as needed for low blood sugar. 37.5 mL 0    insulin glargine (LANTUS PEN) 100 UNIT/ML pen Inject 7 Units subcutaneously at bedtime. HOLD if Blood Glucose<120 3 mL 0    losartan (COZAAR) 25 MG tablet Take 1 tablet (25 mg) by mouth daily. HOLD if SBP<100 30 tablet 0    medical cannabis (Patient's own supply) See Admin Instructions (The purpose of this order is to document that the patient reports taking medical cannabis.  This is not a prescription, and is not used to certify that the patient has a qualifying medical condition.)   Flower - PRN      methocarbamol (ROBAXIN) 500 MG tablet Take 1 tablet (500 mg) by mouth 3 times daily as needed for muscle spasms. 30 tablet 0    metoprolol tartrate (LOPRESSOR) 50 MG tablet Take 1 tablet (50 mg) by mouth 2 times daily. HOLD if SBP<100 and/or HR<55 60 tablet 0    multivitamin, therapeutic (THERA-VIT) TABS tablet Take 1 tablet by mouth daily. 30 tablet 0    ondansetron (ZOFRAN) 4 MG tablet Take 1 tablet (4 mg) by mouth 2 times daily as needed for nausea. 20 tablet 0    oxyCODONE IR (ROXICODONE) 10 MG tablet Take 1 tablet (10 mg) by mouth 3 times daily as needed for severe pain. 30 tablet 0    pantoprazole (PROTONIX) 40 MG EC tablet Take 1 tablet (40 mg) by mouth daily. 30 tablet 0    ramelteon (ROZEREM) 8 MG tablet Take 1 tablet (8 mg) by mouth at bedtime. 30 tablet 0    rosuvastatin (CRESTOR) 40 MG tablet Take 1 tablet (40 mg) by mouth daily. 30 tablet 0    senna-docusate (SENOKOT-S/PERICOLACE) 8.6-50 MG tablet Take 1 tablet by mouth or Feeding Tube 2 times daily as needed for constipation. 30 tablet 0    sertraline (ZOLOFT) 100 MG tablet TAKE 2 TABLETS BY MOUTH  DAILY 60 tablet 0    tamsulosin (FLOMAX) 0.4 MG capsule Take 1 capsule (0.4 mg) by mouth daily. 30 capsule 0    thiamine (B-1) 100 MG tablet Take 1 tablet (100 mg) by mouth daily. 30 tablet 0    thin (NO BRAND SPECIFIED) lancets Use with lanceting device. To accompany: Blood Glucose Monitor Brands: per insurance. 100 each 2    triamterene-HCTZ (DYAZIDE) 37.5-25 MG capsule Take 1 capsule by mouth every morning. HOLD if SBP<100 30 capsule 0     No current facility-administered medications for this visit.       CC  Joel Daniel Wegener        Please do not hesitate to contact me if you have any questions/concerns.     Sincerely,     Cardiovascular Thoracic Surgery

## 2025-02-19 ENCOUNTER — TELEPHONE (OUTPATIENT)
Dept: FAMILY MEDICINE | Facility: CLINIC | Age: 65
End: 2025-02-19
Payer: MEDICARE

## 2025-02-19 ENCOUNTER — HOSPITAL ENCOUNTER (OUTPATIENT)
Dept: CARDIAC REHAB | Facility: CLINIC | Age: 65
Discharge: HOME | End: 2025-02-19
Attending: STUDENT IN AN ORGANIZED HEALTH CARE EDUCATION/TRAINING PROGRAM
Payer: MEDICARE

## 2025-02-19 DIAGNOSIS — Z95.1 S/P CABG (CORONARY ARTERY BYPASS GRAFT): ICD-10-CM

## 2025-02-19 PROCEDURE — 999N000104 HC STATISTIC NO CHARGE

## 2025-02-19 NOTE — TELEPHONE ENCOUNTER
Forms/Letter Request    Type of form/letter: Home Health Certification and plan of care for dates 2/11/25-4/11/25  Order # [id number]        Do we have the form/letter: Yes:     Who is the form from? Home care    Where did/will the form come from? form was faxed in    When is form/letter needed by: asap    How would you like the form/letter returned: Fax : [phone]    Patient Notified form requests are processed in 5-7 business days:N/A    Could we send this information to you in [facility] or would you prefer to receive a phone call?:   No preference     Okay to leave a detailed message?: N/A at Other phone number:  [phone]

## 2025-02-19 NOTE — TELEPHONE ENCOUNTER
Faxed to [facility] [phone] & Copy sent to scan.    Isela CROCKER

## 2025-02-20 ENCOUNTER — MYC MEDICAL ADVICE (OUTPATIENT)
Dept: FAMILY MEDICINE | Facility: CLINIC | Age: 65
End: 2025-02-20
Payer: MEDICARE

## 2025-02-20 DIAGNOSIS — G89.29 CHRONIC PAIN OF RIGHT ANKLE: ICD-10-CM

## 2025-02-20 DIAGNOSIS — G89.4 CHRONIC PAIN SYNDROME: ICD-10-CM

## 2025-02-20 DIAGNOSIS — M65.28 CALCIFIC ACHILLES TENDINITIS: ICD-10-CM

## 2025-02-20 DIAGNOSIS — M25.571 CHRONIC PAIN OF RIGHT ANKLE: ICD-10-CM

## 2025-02-20 DIAGNOSIS — M79.671 INTRACTABLE RIGHT HEEL PAIN: ICD-10-CM

## 2025-02-20 DIAGNOSIS — S86.011A RUPTURE OF RIGHT ACHILLES TENDON, INITIAL ENCOUNTER: ICD-10-CM

## 2025-02-20 DIAGNOSIS — M21.6X1 ACQUIRED CALCANEUS DEFORMITY OF RIGHT ANKLE: ICD-10-CM

## 2025-02-20 DIAGNOSIS — Z98.890 H/O FOOT SURGERY: ICD-10-CM

## 2025-02-20 RX ORDER — OXYCODONE HYDROCHLORIDE 10 MG/1
10 TABLET ORAL 3 TIMES DAILY PRN
Qty: 90 TABLET | Refills: 0 | Status: SHIPPED | OUTPATIENT
Start: 2025-02-20

## 2025-02-26 ENCOUNTER — NURSE TRIAGE (OUTPATIENT)
Dept: FAMILY MEDICINE | Facility: CLINIC | Age: 65
End: 2025-02-26

## 2025-02-26 ENCOUNTER — TELEPHONE (OUTPATIENT)
Dept: FAMILY MEDICINE | Facility: CLINIC | Age: 65
End: 2025-02-26

## 2025-02-26 ENCOUNTER — OFFICE VISIT (OUTPATIENT)
Dept: CARDIOLOGY | Facility: CLINIC | Age: 65
End: 2025-02-26
Payer: MEDICARE

## 2025-02-26 VITALS
BODY MASS INDEX: 22.32 KG/M2 | WEIGHT: 160 LBS | DIASTOLIC BLOOD PRESSURE: 80 MMHG | RESPIRATION RATE: 16 BRPM | HEART RATE: 76 BPM | SYSTOLIC BLOOD PRESSURE: 148 MMHG

## 2025-02-26 DIAGNOSIS — Z95.1 S/P CABG X 4: ICD-10-CM

## 2025-02-26 PROCEDURE — 3079F DIAST BP 80-89 MM HG: CPT | Performed by: INTERNAL MEDICINE

## 2025-02-26 PROCEDURE — 99214 OFFICE O/P EST MOD 30 MIN: CPT | Performed by: INTERNAL MEDICINE

## 2025-02-26 PROCEDURE — 3077F SYST BP >= 140 MM HG: CPT | Performed by: INTERNAL MEDICINE

## 2025-02-26 PROCEDURE — 1111F DSCHRG MED/CURRENT MED MERGE: CPT | Performed by: INTERNAL MEDICINE

## 2025-02-26 PROCEDURE — G2211 COMPLEX E/M VISIT ADD ON: HCPCS | Performed by: INTERNAL MEDICINE

## 2025-02-26 NOTE — TELEPHONE ENCOUNTER
Patient returned call nd relayed Msg Below That His Rx was Approoved  he says Thank You  [facility] Unit Coordinator

## 2025-02-26 NOTE — PROGRESS NOTES
Cardiology Progress Note     Assessment:  Coronary artery disease with history of acute coronary syndrome/incessant ventricular arrhythmias /cardiogenic shock/ LVAD support /coronary artery bypass graft surgery(LIMA to LAD and vein grafts to obtuse marginal ramus intermedius and distal RCA) in January 2025-no angina  History of paroxysmal atrial fibrillation status post surgical maze and left atrial appendage clip the time of the CABG  Severe malnutrition  Peripheral arterial disease status post bilateral lower extremity angioplasty and right BKA  Hypercholesterolemia on rosuvastatin  Hypertension, improved control  Alcoholic liver disease with history of portal hypertension and esophageal bleeding, well controlled  Diabetes mellitus    Plan:  Continue current cardiac medications  Will work on getting him home care and then cardiac rehab    Follow-up in 3 months    The longitudinal plan of care for the diagnosis(es)/condition(s) as documented were addressed during this visit. Due to the added complexity in care, I will continue to support Erwin in the subsequent management and with ongoing continuity of care.   Subjective:   This is 64 year old male who comes in today follow-up visit.  At the end of last year he presented to [facility] with chest pains.  He had diffuse ST segment depression.  He developed A-fib and became unstable.  He had incessant ventricular tachycardia which required LVAD support.  He underwent coronary angiogram was found to have multivessel coronary artery disease.  He underwent CABG along with surgical Maze procedure and AtriClip.  He was discharged to TCU and now he is back home.  He has lost a lot of weight.  He feels very weak but denies chest pains.  He has not had any incisional pains.  He denies shortness of breath.  He continues to have unhealed wound in the right groin.  It is getting smaller.  Did not notice any foul smell or redness.  He keeps dressing and inflated packing that with  the tape himself.  He was supposed to get a home care nurse to visit him but that has never happened.  He does not know why    Review of Systems:   Negative other than history of present illness    Objective:   BP (!) 148/80 (BP Location: Right arm, Patient Position: Sitting, Cuff Size: Adult Regular)   Pulse 76   Resp 16   Wt 72.6 kg (160 lb)   BMI 22.32 kg/m    Physical Exam:  GENERAL: no distress  NECK: No JVD  LUNGS: Clear to auscultation.  CARDIAC: regular rhythm, S1 & S2 normal.  No heaves, thrills, gallops or murmurs.  ABDOMEN: flat, negative hepatosplenomegaly, soft and non-tender.  EXTREMITIES: No evidence of cyanosis, clubbing or edema.  Right BKA    Current Outpatient Medications   Medication Sig Dispense Refill    albuterol (PROAIR HFA/PROVENTIL HFA/VENTOLIN HFA) 108 (90 Base) MCG/ACT inhaler INHALE 2 PUFFS BY MOUTH EVERY 6 HOURS AS NEEDED FOR SHORTNESS OF BREATH OR WHEEZING 18 g 0    aspirin (ASA) 81 MG chewable tablet Take 1 tablet (81 mg) by mouth daily. 30 tablet 0    baclofen (LIORESAL) 10 MG tablet Take 1 tablet (10 mg) by mouth 3 times daily as needed for muscle spasms. 20 tablet 0    blood glucose (NO BRAND SPECIFIED) test strip Use to test blood sugar 3 times daily or as directed. To accompany: Blood Glucose Monitor Brands: per insurance. 100 strip 2    blood glucose monitoring (NO BRAND SPECIFIED) meter device kit Use to test blood sugar 3 times daily or as directed. Preferred blood glucose meter OR supplies to accompany: Blood Glucose Monitor Brands: per insurance. 1 kit 0    gabapentin (NEURONTIN) 300 MG capsule Give gabapentin 300mg at 1600 and take 3 tablets (900mg) daily at Hs 60 capsule 0    insulin glargine (LANTUS PEN) 100 UNIT/ML pen Inject 7 Units subcutaneously at bedtime. HOLD if Blood Glucose<120 3 mL 0    losartan (COZAAR) 25 MG tablet Take 1 tablet (25 mg) by mouth daily. HOLD if SBP<100 30 tablet 0    medical cannabis (Patient's own supply) See Admin Instructions (The  purpose of this order is to document that the patient reports taking medical cannabis.  This is not a prescription, and is not used to certify that the patient has a qualifying medical condition.)   Flower - PRN      methocarbamol (ROBAXIN) 500 MG tablet Take 1 tablet (500 mg) by mouth 3 times daily as needed for muscle spasms. 30 tablet 0    metoprolol tartrate (LOPRESSOR) 50 MG tablet Take 1 tablet (50 mg) by mouth 2 times daily. HOLD if SBP<100 and/or HR<55 60 tablet 0    multivitamin, therapeutic (THERA-VIT) TABS tablet Take 1 tablet by mouth daily. 30 tablet 0    ondansetron (ZOFRAN) 4 MG tablet Take 1 tablet (4 mg) by mouth 2 times daily as needed for nausea. 20 tablet 0    oxyCODONE IR (ROXICODONE) 10 MG tablet Take 1 tablet (10 mg) by mouth 3 times daily as needed for severe pain. #90 for one month supply 90 tablet 0    pantoprazole (PROTONIX) 40 MG EC tablet Take 1 tablet (40 mg) by mouth daily. 30 tablet 0    ramelteon (ROZEREM) 8 MG tablet Take 1 tablet (8 mg) by mouth at bedtime. 30 tablet 0    rosuvastatin (CRESTOR) 40 MG tablet Take 1 tablet (40 mg) by mouth daily. 30 tablet 0    sertraline (ZOLOFT) 100 MG tablet TAKE 2 TABLETS BY MOUTH DAILY 60 tablet 0    tamsulosin (FLOMAX) 0.4 MG capsule Take 1 capsule (0.4 mg) by mouth daily. 30 capsule 0    thiamine (B-1) 100 MG tablet Take 1 tablet (100 mg) by mouth daily. 30 tablet 0    thin (NO BRAND SPECIFIED) lancets Use with lanceting device. To accompany: Blood Glucose Monitor Brands: per insurance. 100 each 2    triamterene-HCTZ (DYAZIDE) 37.5-25 MG capsule Take 1 capsule by mouth every morning. HOLD if SBP<100 30 capsule 0    acetaminophen (TYLENOL) 325 MG tablet Take 2 tablets (650 mg) by mouth every 8 hours as needed for mild pain. (Patient not taking: Reported on 2/26/2025)      calcium carbonate (TUMS) 500 MG chewable tablet Take 1 tablet (500 mg) by mouth 3 times daily as needed for heartburn. (Patient not taking: Reported on 2/26/2025) 30 tablet 0  "   glucose 40 % (400 mg/mL) gel Take 15-30 g by mouth every 15 minutes as needed for low blood sugar. (Patient not taking: Reported on 2/26/2025) 37.5 mL 0    senna-docusate (SENOKOT-S/PERICOLACE) 8.6-50 MG tablet Take 1 tablet by mouth or Feeding Tube 2 times daily as needed for constipation. (Patient not taking: Reported on 2/26/2025) 30 tablet 0       Cardiographics:      Echocardiogram: 1/20/2025 LVEF 55 to 60% no significant valvular abnormalities    Coronary angio: 12/31/2024  Severe obstructive multivessel coronary artery disease  Severe obstructive distal left main at a complex calcified bifurcation lesion of the ostial LAD, a large ramus branch, as well as the ostial left circumflex  Severe obstructive disease of the ostial RCA as well as the distal RCA  Cardiothoracic surgery consult placed for bypass evaluation to be done as inpatient  Successful insertion of VA ECMO as well as distal reperfusion cannula   Lab Results    Chemistry/lipid CBC Cardiac Enzymes/BNP/TSH/INR   Recent Labs   Lab Test 04/23/24  1155   CHOL 143   HDL 60   LDL 70   TRIG 65     Recent Labs   Lab Test 04/23/24  1155 01/09/24  0932 10/21/22  1006   LDL 70 113* 105*     Recent Labs   Lab Test 02/04/25  1052   *   POTASSIUM 5.0   CHLORIDE 99   CO2 23   *   BUN 8.4   CR 0.57*   GFRESTIMATED >90   [place]FR[place] 9.3     Recent Labs   Lab Test 02/04/25  1052 01/28/25  0315 01/25/25  0411   CR 0.57* 0.58* 0.60*     Recent Labs   Lab Test 12/31/24  1327 05/03/24  1105 01/09/24  0932   A1C 5.7* 5.9* 5.9*          Recent Labs   Lab Test 02/04/25  1052   WBC 9.0   HGB 11.2*   HCT 35.4*   MCV 93        Recent Labs   Lab Test 02/04/25  1052 01/28/25  0315 01/25/25  0411   HGB 11.2* 11.8* 9.3*    No results for input(s): \"TROPONINI\" in the last 89476 hours.  No results for input(s): \"BNP\", \"NTBNPI\", \"NTBNP\" in the last 79558 hours.  Recent Labs   Lab Test 01/02/25  0333   TSH 0.48     Recent Labs   Lab Test 01/28/25  0315 01/22/25  0413 " 01/03/25  0336   INR 1.16* 1.37* 1.29*

## 2025-02-26 NOTE — TELEPHONE ENCOUNTER
Called patient and left non-detailed voicemail to call clinic back at [phone].   When patient calls back, please inform patient PA for oxycodone was approved.   Patient can  the full 90 tablets today at pharmacy.    Livia CROCKER

## 2025-02-26 NOTE — TELEPHONE ENCOUNTER
Called pharmacy - confirmed medication is ready for pickup    TC team - please contact patient to let him know    Brenda ARZATE RN

## 2025-02-26 NOTE — LETTER
2/26/2025    Joel Daniel Wegener, MD  [address] [facility] [address]  [facility] [address]    RE: Erwin Aguilar       Dear Colleague,     I had the pleasure of seeing Erwin Aguilar in the [facility].      Cardiology Progress Note     Assessment:  Coronary artery disease with history of acute coronary syndrome/incessant ventricular arrhythmias /cardiogenic shock/ LVAD support /coronary artery bypass graft surgery(LIMA to LAD and vein grafts to obtuse marginal ramus intermedius and distal RCA) in January 2025-no angina  History of paroxysmal atrial fibrillation status post surgical maze and left atrial appendage clip the time of the CABG  Severe malnutrition  Peripheral arterial disease status post bilateral lower extremity angioplasty and right BKA  Hypercholesterolemia on rosuvastatin  Hypertension, improved control  Alcoholic liver disease with history of portal hypertension and esophageal bleeding, well controlled  Diabetes mellitus    Plan:  Continue current cardiac medications  Will work on getting him home care and then cardiac rehab    Follow-up in 3 months    The longitudinal plan of care for the diagnosis(es)/condition(s) as documented were addressed during this visit. Due to the added complexity in care, I will continue to support Erwin in the subsequent management and with ongoing continuity of care.   Subjective:   This is 64 year old male who comes in today follow-up visit.  At the end of last year he presented to [facility] with chest pains.  He had diffuse ST segment depression.  He developed A-fib and became unstable.  He had incessant ventricular tachycardia which required LVAD support.  He underwent coronary angiogram was found to have multivessel coronary artery disease.  He underwent CABG along with surgical Maze procedure and AtriClip.  He was discharged to TCU and now he is back home.  He has lost a lot of weight.  He feels very weak but denies chest pains.  He has not had any  incisional pains.  He denies shortness of breath.  He continues to have unhealed wound in the right groin.  It is getting smaller.  Did not notice any foul smell or redness.  He keeps dressing and inflated packing that with the tape himself.  He was supposed to get a home care nurse to visit him but that has never happened.  He does not know why    Review of Systems:   Negative other than history of present illness    Objective:   BP (!) 148/80 (BP Location: Right arm, Patient Position: Sitting, Cuff Size: Adult Regular)   Pulse 76   Resp 16   Wt 72.6 kg (160 lb)   BMI 22.32 kg/m    Physical Exam:  GENERAL: no distress  NECK: No JVD  LUNGS: Clear to auscultation.  CARDIAC: regular rhythm, S1 & S2 normal.  No heaves, thrills, gallops or murmurs.  ABDOMEN: flat, negative hepatosplenomegaly, soft and non-tender.  EXTREMITIES: No evidence of cyanosis, clubbing or edema.  Right BKA    Current Outpatient Medications   Medication Sig Dispense Refill     albuterol (PROAIR HFA/PROVENTIL HFA/VENTOLIN HFA) 108 (90 Base) MCG/ACT inhaler INHALE 2 PUFFS BY MOUTH EVERY 6 HOURS AS NEEDED FOR SHORTNESS OF BREATH OR WHEEZING 18 g 0     aspirin (ASA) 81 MG chewable tablet Take 1 tablet (81 mg) by mouth daily. 30 tablet 0     baclofen (LIORESAL) 10 MG tablet Take 1 tablet (10 mg) by mouth 3 times daily as needed for muscle spasms. 20 tablet 0     blood glucose (NO BRAND SPECIFIED) test strip Use to test blood sugar 3 times daily or as directed. To accompany: Blood Glucose Monitor Brands: per insurance. 100 strip 2     blood glucose monitoring (NO BRAND SPECIFIED) meter device kit Use to test blood sugar 3 times daily or as directed. Preferred blood glucose meter OR supplies to accompany: Blood Glucose Monitor Brands: per insurance. 1 kit 0     gabapentin (NEURONTIN) 300 MG capsule Give gabapentin 300mg at 1600 and take 3 tablets (900mg) daily at Hs 60 capsule 0     insulin glargine (LANTUS PEN) 100 UNIT/ML pen Inject 7 Units  subcutaneously at bedtime. HOLD if Blood Glucose<120 3 mL 0     losartan (COZAAR) 25 MG tablet Take 1 tablet (25 mg) by mouth daily. HOLD if SBP<100 30 tablet 0     medical cannabis (Patient's own supply) See Admin Instructions (The purpose of this order is to document that the patient reports taking medical cannabis.  This is not a prescription, and is not used to certify that the patient has a qualifying medical condition.)   Flower - PRN       methocarbamol (ROBAXIN) 500 MG tablet Take 1 tablet (500 mg) by mouth 3 times daily as needed for muscle spasms. 30 tablet 0     metoprolol tartrate (LOPRESSOR) 50 MG tablet Take 1 tablet (50 mg) by mouth 2 times daily. HOLD if SBP<100 and/or HR<55 60 tablet 0     multivitamin, therapeutic (THERA-VIT) TABS tablet Take 1 tablet by mouth daily. 30 tablet 0     ondansetron (ZOFRAN) 4 MG tablet Take 1 tablet (4 mg) by mouth 2 times daily as needed for nausea. 20 tablet 0     oxyCODONE IR (ROXICODONE) 10 MG tablet Take 1 tablet (10 mg) by mouth 3 times daily as needed for severe pain. #90 for one month supply 90 tablet 0     pantoprazole (PROTONIX) 40 MG EC tablet Take 1 tablet (40 mg) by mouth daily. 30 tablet 0     ramelteon (ROZEREM) 8 MG tablet Take 1 tablet (8 mg) by mouth at bedtime. 30 tablet 0     rosuvastatin (CRESTOR) 40 MG tablet Take 1 tablet (40 mg) by mouth daily. 30 tablet 0     sertraline (ZOLOFT) 100 MG tablet TAKE 2 TABLETS BY MOUTH DAILY 60 tablet 0     tamsulosin (FLOMAX) 0.4 MG capsule Take 1 capsule (0.4 mg) by mouth daily. 30 capsule 0     thiamine (B-1) 100 MG tablet Take 1 tablet (100 mg) by mouth daily. 30 tablet 0     thin (NO BRAND SPECIFIED) lancets Use with lanceting device. To accompany: Blood Glucose Monitor Brands: per insurance. 100 each 2     triamterene-HCTZ (DYAZIDE) 37.5-25 MG capsule Take 1 capsule by mouth every morning. HOLD if SBP<100 30 capsule 0     acetaminophen (TYLENOL) 325 MG tablet Take 2 tablets (650 mg) by mouth every 8 hours as  needed for mild pain. (Patient not taking: Reported on 2/26/2025)       calcium carbonate (TUMS) 500 MG chewable tablet Take 1 tablet (500 mg) by mouth 3 times daily as needed for heartburn. (Patient not taking: Reported on 2/26/2025) 30 tablet 0     glucose 40 % (400 mg/mL) gel Take 15-30 g by mouth every 15 minutes as needed for low blood sugar. (Patient not taking: Reported on 2/26/2025) 37.5 mL 0     senna-docusate (SENOKOT-S/PERICOLACE) 8.6-50 MG tablet Take 1 tablet by mouth or Feeding Tube 2 times daily as needed for constipation. (Patient not taking: Reported on 2/26/2025) 30 tablet 0       Cardiographics:      Echocardiogram: 1/20/2025 LVEF 55 to 60% no significant valvular abnormalities    Coronary angio: 12/31/2024  Severe obstructive multivessel coronary artery disease  Severe obstructive distal left main at a complex calcified bifurcation lesion of the ostial LAD, a large ramus branch, as well as the ostial left circumflex  Severe obstructive disease of the ostial RCA as well as the distal RCA  Cardiothoracic surgery consult placed for bypass evaluation to be done as inpatient  Successful insertion of VA ECMO as well as distal reperfusion cannula   Lab Results    Chemistry/lipid CBC Cardiac Enzymes/BNP/TSH/INR   Recent Labs   Lab Test 04/23/24  1155   CHOL 143   HDL 60   LDL 70   TRIG 65     Recent Labs   Lab Test 04/23/24  1155 01/09/24  0932 10/21/22  1006   LDL 70 113* 105*     Recent Labs   Lab Test 02/04/25  1052   *   POTASSIUM 5.0   CHLORIDE 99   CO2 23   *   BUN 8.4   CR 0.57*   GFRESTIMATED >90   [place]FRICA 9.3     Recent Labs   Lab Test 02/04/25  1052 01/28/25  0315 01/25/25  0411   CR 0.57* 0.58* 0.60*     Recent Labs   Lab Test 12/31/24  1327 05/03/24  1105 01/09/24  0932   A1C 5.7* 5.9* 5.9*          Recent Labs   Lab Test 02/04/25  1052   WBC 9.0   HGB 11.2*   HCT 35.4*   MCV 93        Recent Labs   Lab Test 02/04/25  1052 01/28/25  0315 01/25/25  0411   HGB 11.2* 11.8* 9.3*  "   No results for input(s): \"TROPONINI\" in the last 05289 hours.  No results for input(s): \"BNP\", \"NTBNPI\", \"NTBNP\" in the last 50780 hours.  Recent Labs   Lab Test 01/02/25  0333   TSH 0.48     Recent Labs   Lab Test 01/28/25  0315 01/22/25  0413 01/03/25  0336   INR 1.16* 1.37* 1.29*                         Thank you for allowing me to participate in the care of your patient.      Sincerely,     Markus Fox MD     [facility]  cc:   JAMI Lind  [address]  [place] [address]      "

## 2025-02-26 NOTE — TELEPHONE ENCOUNTER
Dr. Wegener,    Please see below [employer] Interpreting message and advise.   Appears oxycodone prior auth initiated today    Thanks,   Brenda ARZATE RN

## 2025-02-26 NOTE — TELEPHONE ENCOUNTER
"Patient called.   Very frustrated with the following concerns:    Prescriptions:  Oxycodone  Was told by pharmacy he cannot have refill. JW did send in 90 tablets on 2/20/25 and patient was told by pharmacy he cannot refill medication at the time as they are connecting w/ insurance first. Has been out of oxy for two weeks. Continues to experience phantom pain/behind the knee pain.   Diabetic supplies - insulin, test strips, meters.   Hasn't been able to take his blood sugar.  Diarrhea:  Continues to experience diarrhea for the past 1 week. Has lost over 20 lbs. Patient is about 165 lbs. Hasn't felt this skinny in a long time. Had to buy all new clothes. Only drinks protein shakes. Today, he feels he may be turning the corner. Has been eating yogurt which is helpful.  Home care:  Home care has not been to his house since he was discharged on 2/7/25. Per patient, he was \"fired\" from home care. Per home care, home care has tried to reach him several times but didn't leave a voicemail. Since 2/7/25, he has continued to manage his own wound care.  Patient doesn't want [facility] to come to his house, prefers [facility] moving forward. He would like [facility] to start cardio therapy + would care.     Of note, patient has cardiology appt today.      Will reach out to pharmacy when they open and connect w/ J[facility] to start home care w/ [facility].       Pharmacy:  Oxycodoone:  Spoke w/ pharmacy. Insurance will cover only 7 days. Will need PA for the remaining dose. Will initiate PA right now.   Diabetic supplies:  Per pharmacy, the lancets, kits, and test strips will be ready.         9:19AM - Spoke w/ patient regarding prescriptions.   Oxycodone:  Patient is concerned on starting the oxycodone because he feels if he starts now, the prior authorization won't be approved and he will be in pain again once he finishes the 7 day prescription. Did inform patient we send the prior authorization as a high priority. He hasn't taken " any oxycodone for the past few weeks. Hopefully he will hear back soon regarding the prior authorization. Patient did hang up on me while on the call. Patient didn't sound optimistic about PA. Wondering why we have to start one now.     Diabetic supplies:   Patient will not  supplies at this time. Patient states he knows his body and can determine if his blood sugars are high/low. States he hasn't taken his blood sugars in over 14 years and was recommended now to take them per cardiology.      Livia CROCKER

## 2025-03-03 ENCOUNTER — PATIENT OUTREACH (OUTPATIENT)
Dept: CARE COORDINATION | Facility: CLINIC | Age: 65
End: 2025-03-03
Payer: MEDICARE

## 2025-03-03 NOTE — PROGRESS NOTES
Clinic Care Coordination Contact  Care Coordination Clinician Chart Review    Situation: Patient chart reviewed by care coordinator.    Background: Clinic Care Coordination Referral received from inpatient care team for transition handoff communication following hospital admission. (For post TCU needs)    Assessment: Upon chart review, patient is not a candidate for Primary Care Clinic Care Coordination enrollment due to reason stated below:  Patient spoke with clinic 2/28 and declined Care Coordination involvement.    Plan/Recommendations: Clinic Care Coordination Referral/order cancelled. RN/SW CC will perform no further monitoring/outreaches at this time and will remain available as needed. If new needs arise, a new Care Coordination Referral may be placed.    FLORIN StephensN, RN, PHN   Care Coordinator-Ambulatory Care Management  [facility]  [phone]

## 2025-03-04 ENCOUNTER — TELEPHONE (OUTPATIENT)
Dept: CARDIOLOGY | Facility: CLINIC | Age: 65
End: 2025-03-04
Payer: MEDICARE

## 2025-03-05 ENCOUNTER — TELEPHONE (OUTPATIENT)
Dept: FAMILY MEDICINE | Facility: CLINIC | Age: 65
End: 2025-03-05
Payer: MEDICARE

## 2025-03-05 ENCOUNTER — MYC MEDICAL ADVICE (OUTPATIENT)
Dept: CARDIOLOGY | Facility: CLINIC | Age: 65
End: 2025-03-05
Payer: MEDICARE

## 2025-03-05 NOTE — TELEPHONE ENCOUNTER
Forms faxed to [facility] fax # [phone] and copy sent to stat scan.     Livia CROCKER

## 2025-03-05 NOTE — TELEPHONE ENCOUNTER
Forms/Letter Request    Type of form/letter: Home Health Certification and Plan Of Care  2/11/25--4/11/25 Order # [id number]      Do we have the form/letter: Yes:     Who is the form from? [facility]    Where did/will the form come from? form was faxed in    When is form/letter needed by: asap    How would you like the form/letter returned: Fax : [phone]  and [phone]

## 2025-03-05 NOTE — TELEPHONE ENCOUNTER
Per [place] Community Measures guidelines, patients blood pressure is out of parameters and recheck blood pressure is recommended.    Last Blood Pressure: 148/80  Last Heart Rate: 76  Date: 2/26/25  Location: [facility]    Today's Blood Pressure: 131/79  Today's Heart Rate: 73  Location: Home BP    Patient reported blood pressure updated in Epic. Blood pressure falls within [place] Community Measures guidelines.  Patient will follow up as previously advised.   
General

## 2025-03-10 ENCOUNTER — MYC MEDICAL ADVICE (OUTPATIENT)
Dept: CARDIOLOGY | Facility: CLINIC | Age: 65
End: 2025-03-10
Payer: MEDICARE

## 2025-03-10 ENCOUNTER — TELEPHONE (OUTPATIENT)
Facility: CLINIC | Age: 65
End: 2025-03-10
Payer: MEDICARE

## 2025-03-10 DIAGNOSIS — Z95.1 S/P CABG X 4: ICD-10-CM

## 2025-03-10 DIAGNOSIS — E78.5 HYPERLIPIDEMIA LDL GOAL <100: ICD-10-CM

## 2025-03-10 DIAGNOSIS — I10 HYPERTENSION GOAL BP (BLOOD PRESSURE) < 130/80: ICD-10-CM

## 2025-03-10 NOTE — TELEPHONE ENCOUNTER
Need CT order updated to include IV conscious sedation. As pt can not lay flat or still for exam.   Patient would like this updated asap as pt  has appt on 3/18.    Please call patient when order updated.    Thank you

## 2025-03-11 RX ORDER — METOPROLOL TARTRATE 50 MG
50 TABLET ORAL 2 TIMES DAILY
Qty: 180 TABLET | Refills: 0 | Status: SHIPPED | OUTPATIENT
Start: 2025-03-11

## 2025-03-11 RX ORDER — ROSUVASTATIN CALCIUM 40 MG/1
40 TABLET, COATED ORAL DAILY
Qty: 90 TABLET | Refills: 0 | Status: SHIPPED | OUTPATIENT
Start: 2025-03-11

## 2025-03-11 RX ORDER — TRIAMTERENE AND HYDROCHLOROTHIAZIDE 37.5; 25 MG/1; MG/1
1 CAPSULE ORAL EVERY MORNING
Qty: 90 CAPSULE | Refills: 0 | Status: SHIPPED | OUTPATIENT
Start: 2025-03-11

## 2025-03-18 ENCOUNTER — MYC MEDICAL ADVICE (OUTPATIENT)
Dept: CARDIOLOGY | Facility: CLINIC | Age: 65
End: 2025-03-18
Payer: MEDICARE

## 2025-03-19 ENCOUNTER — MYC MEDICAL ADVICE (OUTPATIENT)
Dept: FAMILY MEDICINE | Facility: CLINIC | Age: 65
End: 2025-03-19
Payer: MEDICARE

## 2025-03-19 ENCOUNTER — MYC MEDICAL ADVICE (OUTPATIENT)
Dept: CARDIOLOGY | Facility: CLINIC | Age: 65
End: 2025-03-19
Payer: MEDICARE

## 2025-03-19 DIAGNOSIS — Z95.1 S/P CABG X 4: ICD-10-CM

## 2025-03-19 NOTE — TELEPHONE ENCOUNTER
Discussed with Dr. Fox with clinic- scan ordered by CV surgery and needs to be addressed by this team on if needed or not and his request for sedation. -[facility]

## 2025-03-19 NOTE — TELEPHONE ENCOUNTER
Per chart review:    Rx previously filled by   [facility] Authorized By: Allison Conley APRN CNP (through transitional care unit)    Pt recently seen virtually on 2/04/25.   Routing request to PCP.     Brenda ARZATE RN

## 2025-03-19 NOTE — TELEPHONE ENCOUNTER
[facility] Call Center    Phone Message    May a detailed message be left on voicemail: yes     Reason for Call: Other: Pt calling and wants an update regarding CT order. Pt is request a call back to schedule once the order are corrected.      Action Taken: Other: Cardio     Travel Screening: Not Applicable     Date of Service:         Thank you!  [facility]

## 2025-03-20 RX ORDER — GABAPENTIN 300 MG/1
CAPSULE ORAL
Qty: 360 CAPSULE | Refills: 3 | Status: SHIPPED | OUTPATIENT
Start: 2025-03-20

## 2025-03-21 ENCOUNTER — MYC MEDICAL ADVICE (OUTPATIENT)
Dept: FAMILY MEDICINE | Facility: CLINIC | Age: 65
End: 2025-03-21
Payer: MEDICARE

## 2025-03-21 DIAGNOSIS — J45.40 MODERATE PERSISTENT ASTHMA WITHOUT COMPLICATION: ICD-10-CM

## 2025-03-21 DIAGNOSIS — S86.011A RUPTURE OF RIGHT ACHILLES TENDON, INITIAL ENCOUNTER: ICD-10-CM

## 2025-03-21 DIAGNOSIS — Z98.890 H/O FOOT SURGERY: ICD-10-CM

## 2025-03-21 DIAGNOSIS — G89.29 CHRONIC PAIN OF RIGHT ANKLE: ICD-10-CM

## 2025-03-21 DIAGNOSIS — F11.20 CONTINUOUS OPIOID DEPENDENCE (H): ICD-10-CM

## 2025-03-21 DIAGNOSIS — Z95.1 S/P CABG X 4: ICD-10-CM

## 2025-03-21 DIAGNOSIS — M65.28 CALCIFIC ACHILLES TENDINITIS: ICD-10-CM

## 2025-03-21 DIAGNOSIS — F11.90 CHRONIC, CONTINUOUS USE OF OPIOIDS: Primary | ICD-10-CM

## 2025-03-21 DIAGNOSIS — M25.571 CHRONIC PAIN OF RIGHT ANKLE: ICD-10-CM

## 2025-03-21 DIAGNOSIS — M21.6X1 ACQUIRED CALCANEUS DEFORMITY OF RIGHT ANKLE: ICD-10-CM

## 2025-03-21 DIAGNOSIS — G89.4 CHRONIC PAIN SYNDROME: ICD-10-CM

## 2025-03-21 DIAGNOSIS — M79.671 INTRACTABLE RIGHT HEEL PAIN: ICD-10-CM

## 2025-03-24 ENCOUNTER — MYC MEDICAL ADVICE (OUTPATIENT)
Dept: FAMILY MEDICINE | Facility: CLINIC | Age: 65
End: 2025-03-24
Payer: MEDICARE

## 2025-03-24 DIAGNOSIS — Z12.11 SPECIAL SCREENING FOR MALIGNANT NEOPLASMS, COLON: Primary | ICD-10-CM

## 2025-03-24 RX ORDER — ALBUTEROL SULFATE 90 UG/1
INHALANT RESPIRATORY (INHALATION)
Qty: 18 G | Refills: 11 | Status: SHIPPED | OUTPATIENT
Start: 2025-03-24

## 2025-03-24 RX ORDER — OXYCODONE HYDROCHLORIDE 10 MG/1
10 TABLET ORAL 3 TIMES DAILY PRN
Qty: 90 TABLET | Refills: 0 | Status: SHIPPED | OUTPATIENT
Start: 2025-03-25

## 2025-03-26 ENCOUNTER — HOSPITAL ENCOUNTER (EMERGENCY)
Facility: CLINIC | Age: 65
Discharge: HOME | End: 2025-03-26
Attending: EMERGENCY MEDICINE | Admitting: EMERGENCY MEDICINE
Payer: MEDICARE

## 2025-03-26 ENCOUNTER — ORDERS ONLY (AUTO-RELEASED) (OUTPATIENT)
Dept: FAMILY MEDICINE | Facility: CLINIC | Age: 65
End: 2025-03-26

## 2025-03-26 VITALS
RESPIRATION RATE: 18 BRPM | HEART RATE: 99 BPM | OXYGEN SATURATION: 98 % | DIASTOLIC BLOOD PRESSURE: 77 MMHG | SYSTOLIC BLOOD PRESSURE: 128 MMHG

## 2025-03-26 DIAGNOSIS — Z12.11 SPECIAL SCREENING FOR MALIGNANT NEOPLASMS, COLON: ICD-10-CM

## 2025-03-26 DIAGNOSIS — R11.2 NAUSEA AND VOMITING, UNSPECIFIED VOMITING TYPE: ICD-10-CM

## 2025-03-26 LAB
ALBUMIN SERPL BCG-MCNC: 4.4 G/DL (ref 3.5–5.2)
ALBUMIN UR-MCNC: NEGATIVE MG/DL
ALP SERPL-CCNC: 167 U/L (ref 40–150)
ALT SERPL W P-5'-P-CCNC: 19 U/L (ref 0–70)
ANION GAP SERPL CALCULATED.3IONS-SCNC: 16 MMOL/L (ref 7–15)
APPEARANCE UR: CLEAR
AST SERPL W P-5'-P-CCNC: 26 U/L (ref 0–45)
BASOPHILS # BLD AUTO: 0.1 10E3/UL (ref 0–0.2)
BASOPHILS NFR BLD AUTO: 1 %
BILIRUB SERPL-MCNC: 0.5 MG/DL
BILIRUB UR QL STRIP: NEGATIVE
BUN SERPL-MCNC: 12.4 MG/DL (ref 8–23)
CALCIUM SERPL-MCNC: 10 MG/DL (ref 8.8–10.4)
CHLORIDE SERPL-SCNC: 104 MMOL/L (ref 98–107)
COLOR UR AUTO: ABNORMAL
CREAT SERPL-MCNC: 0.52 MG/DL (ref 0.67–1.17)
EGFRCR SERPLBLD CKD-EPI 2021: >90 ML/MIN/1.73M2
EOSINOPHIL # BLD AUTO: 0.1 10E3/UL (ref 0–0.7)
EOSINOPHIL NFR BLD AUTO: 1 %
ERYTHROCYTE [DISTWIDTH] IN BLOOD BY AUTOMATED COUNT: 13.7 % (ref 10–15)
GLUCOSE BLDC GLUCOMTR-MCNC: 142 MG/DL (ref 70–99)
GLUCOSE SERPL-MCNC: 142 MG/DL (ref 70–99)
GLUCOSE UR STRIP-MCNC: NEGATIVE MG/DL
HCO3 SERPL-SCNC: 21 MMOL/L (ref 22–29)
HCT VFR BLD AUTO: 42.9 % (ref 40–53)
HGB BLD-MCNC: 14.5 G/DL (ref 13.3–17.7)
HGB UR QL STRIP: NEGATIVE
IMM GRANULOCYTES # BLD: 0 10E3/UL
IMM GRANULOCYTES NFR BLD: 0 %
KETONES UR STRIP-MCNC: NEGATIVE MG/DL
LEUKOCYTE ESTERASE UR QL STRIP: NEGATIVE
LIPASE SERPL-CCNC: 38 U/L (ref 13–60)
LYMPHOCYTES # BLD AUTO: 1.2 10E3/UL (ref 0.8–5.3)
LYMPHOCYTES NFR BLD AUTO: 13 %
MCH RBC QN AUTO: 27.7 PG (ref 26.5–33)
MCHC RBC AUTO-ENTMCNC: 33.8 G/DL (ref 31.5–36.5)
MCV RBC AUTO: 82 FL (ref 78–100)
MONOCYTES # BLD AUTO: 0.5 10E3/UL (ref 0–1.3)
MONOCYTES NFR BLD AUTO: 6 %
MUCOUS THREADS #/AREA URNS LPF: PRESENT /LPF
NEUTROPHILS # BLD AUTO: 7.3 10E3/UL (ref 1.6–8.3)
NEUTROPHILS NFR BLD AUTO: 79 %
NITRATE UR QL: NEGATIVE
NRBC # BLD AUTO: 0 10E3/UL
NRBC BLD AUTO-RTO: 0 /100
PH UR STRIP: 6 [PH] (ref 5–7)
PLATELET # BLD AUTO: 253 10E3/UL (ref 150–450)
POTASSIUM SERPL-SCNC: 3.2 MMOL/L (ref 3.4–5.3)
PROT SERPL-MCNC: 7.9 G/DL (ref 6.4–8.3)
RBC # BLD AUTO: 5.24 10E6/UL (ref 4.4–5.9)
RBC URINE: <1 /HPF
SODIUM SERPL-SCNC: 141 MMOL/L (ref 135–145)
SP GR UR STRIP: 1.01 (ref 1–1.03)
TROPONIN T SERPL HS-MCNC: 33 NG/L
TROPONIN T SERPL HS-MCNC: 34 NG/L
UROBILINOGEN UR STRIP-MCNC: NORMAL MG/DL
WBC # BLD AUTO: 9.3 10E3/UL (ref 4–11)
WBC URINE: 2 /HPF

## 2025-03-26 PROCEDURE — 99285 EMERGENCY DEPT VISIT HI MDM: CPT | Mod: 25 | Performed by: EMERGENCY MEDICINE

## 2025-03-26 PROCEDURE — 258N000003 HC RX IP 258 OP 636: Performed by: EMERGENCY MEDICINE

## 2025-03-26 PROCEDURE — 85048 AUTOMATED LEUKOCYTE COUNT: CPT | Performed by: EMERGENCY MEDICINE

## 2025-03-26 PROCEDURE — 93010 ELECTROCARDIOGRAM REPORT: CPT | Performed by: EMERGENCY MEDICINE

## 2025-03-26 PROCEDURE — 96374 THER/PROPH/DIAG INJ IV PUSH: CPT | Performed by: EMERGENCY MEDICINE

## 2025-03-26 PROCEDURE — 81003 URINALYSIS AUTO W/O SCOPE: CPT | Performed by: EMERGENCY MEDICINE

## 2025-03-26 PROCEDURE — 83690 ASSAY OF LIPASE: CPT | Performed by: EMERGENCY MEDICINE

## 2025-03-26 PROCEDURE — 82947 ASSAY GLUCOSE BLOOD QUANT: CPT | Performed by: EMERGENCY MEDICINE

## 2025-03-26 PROCEDURE — 36415 COLL VENOUS BLD VENIPUNCTURE: CPT | Performed by: EMERGENCY MEDICINE

## 2025-03-26 PROCEDURE — 99284 EMERGENCY DEPT VISIT MOD MDM: CPT | Performed by: EMERGENCY MEDICINE

## 2025-03-26 PROCEDURE — 84484 ASSAY OF TROPONIN QUANT: CPT | Performed by: EMERGENCY MEDICINE

## 2025-03-26 PROCEDURE — 93005 ELECTROCARDIOGRAM TRACING: CPT | Performed by: EMERGENCY MEDICINE

## 2025-03-26 PROCEDURE — 96361 HYDRATE IV INFUSION ADD-ON: CPT | Performed by: EMERGENCY MEDICINE

## 2025-03-26 PROCEDURE — 250N000011 HC RX IP 250 OP 636: Performed by: EMERGENCY MEDICINE

## 2025-03-26 PROCEDURE — 85004 AUTOMATED DIFF WBC COUNT: CPT | Performed by: EMERGENCY MEDICINE

## 2025-03-26 PROCEDURE — 82962 GLUCOSE BLOOD TEST: CPT

## 2025-03-26 PROCEDURE — 85041 AUTOMATED RBC COUNT: CPT | Performed by: EMERGENCY MEDICINE

## 2025-03-26 RX ORDER — LORAZEPAM 2 MG/ML
1 INJECTION INTRAMUSCULAR ONCE
Status: COMPLETED | OUTPATIENT
Start: 2025-03-26 | End: 2025-03-26

## 2025-03-26 RX ORDER — PROCHLORPERAZINE MALEATE 10 MG
10 TABLET ORAL EVERY 8 HOURS PRN
Qty: 12 TABLET | Refills: 0 | Status: SHIPPED | OUTPATIENT
Start: 2025-03-26

## 2025-03-26 RX ORDER — ONDANSETRON 2 MG/ML
4 INJECTION INTRAMUSCULAR; INTRAVENOUS EVERY 30 MIN PRN
Status: DISCONTINUED | OUTPATIENT
Start: 2025-03-26 | End: 2025-03-26 | Stop reason: HOSPADM

## 2025-03-26 RX ADMIN — ONDANSETRON 4 MG: 2 INJECTION INTRAMUSCULAR; INTRAVENOUS at 16:45

## 2025-03-26 RX ADMIN — SODIUM CHLORIDE 500 ML: 0.9 INJECTION, SOLUTION INTRAVENOUS at 16:28

## 2025-03-26 ASSESSMENT — ACTIVITIES OF DAILY LIVING (ADL)
ADLS_ACUITY_SCORE: 58

## 2025-03-26 NOTE — ED TRIAGE NOTES
BIBA from home with nausea and vomiting for 12 hours.    Per EMS, EKG presenting sinus tachycardia. Zofran 4mg given through left PIV.

## 2025-03-26 NOTE — ED NOTES
Offered lorazepam. Pt declined. States he used to take it and went through withdrawal. Stated he does not think it will be effective due to having a high tolerance for drugs.

## 2025-03-26 NOTE — ED PROVIDER NOTES
ED PROVIDER NOTE  [facility]  [place]      History     Chief Complaint   Patient presents with    Abdominal Pain    Nausea & Vomiting     HPI  Erwin Aguilar is a 64 year old male who states that he has been vomiting over the last 24 hours.  Patient states that he is uncertain what has been causing his vomiting but he has no chest pain or shortness of breath associated with it but states his blood pressure has been running somewhat high in the 190s over 100 region.  The patient was brought by ambulance from home with ongoing nausea and vomiting.  The patient has a history of chronic pain, GERD, is status post right BKA, and has a history of alcoholic cirrhosis and type 2 diabetes.  Patient denies any diarrhea, denies any melena or bright blood per rectum, and states that his Apple watch got his heart rate at 128 this morning.    This part of the medical record was transcribed by Ailyn Duran Medical Scribe, from a dictation done by Omid Del Cid MD.     I have reviewed the Medications, Allergies, Past Medical and Surgical History, and Social History in the [facility] system.  Past Medical History:   Diagnosis Date    Actinic keratosis     aldara    Anxiety 12/1997    Cancer (H)     squamous cell skin CA    Cauda equina spinal cord injury (H)     Chronic sinusitis 05/01/2016    Cirrhosis of liver (H) 04/01/2014    Not biopsy-proven as of 4/1/14.      Depressive disorder     Diastasis recti     Esophageal reflux     Esophageal varices in cirrhosis (H) 04/01/2014    Hospitalized for UGI blee 3/28/14, endoscopy revealed bleeding varices.    Essential hypertension, benign     Intermittent asthma     Mild depression     Mixed hyperlipidemia     Nasal polyps 05/01/2016    Nausea vomiting and diarrhea 1/28/2025    Osteoarthritis of lumbar spine, unspecified spinal osteoarthritis complication status     Other chronic pain     Paroxysmal atrial fibrillation (H) 09/22/2021    SCCA (squamous cell carcinoma)  of skin     Seasonal allergic conjunctivitis     Type II or unspecified type diabetes mellitus without mention of complication, not stated as uncontrolled     Unspecified site of spinal cord injury without evidence of spinal bone injury     due to back surgery       Past Surgical History:   Procedure Laterality Date    ABDOMEN SURGERY  2014    AMPUTATE LEG BELOW KNEE Right 05/09/2024    Procedure: Right below knee amputation (transtibial amputation);  Surgeon: Kurtis Nye MD;  Location: [facility] OR    ANGIOGRAM Right 04/23/2024    Procedure: Ultrasound guided percutaneous transarterial left common femoral artery access, diagnostic aortoiliac angiogram, selective cannulation of right comon iliac, righ external iliac, right common femoral, and right superficial femoral artery, balloon angioplasty of right superficial femoral artery, 6mm x 12 cm self-expanding drug-coated stent placement of right superficial femoral artery, lef    BACK SURGERY  08/2009    BYPASS GRAFT ARTERY CORONARY N/A 01/02/2025    Procedure: Median Sternotomy, on Cardiopulmonary Bypass Pump, Left Internal Mammary Artery Montezuma, Endoscopic Montezuma of Left Greater Saphenous Vein, Coronary Artery Bypass Graft x 4, Left Atrial Appendage Ligation, Left Atrial Ablation, and Intraoperative Transesophageal Echocardiogram By Anesthesia;  Surgeon: Bernice Rosenbaum MD;  [facility]    COLONOSCOPY N/A 05/12/2016    Procedure: COMBINED COLONOSCOPY, SINGLE OR MULTIPLE BIOPSY/POLYPECTOMY BY BIOPSY;  Surgeon: Ana Paula Urbina MD;  [facility] GI    CV ARTERIAL LINE PLACEMENT N/A 12/31/2024    Procedure: Arterial Line Placement;  Surgeon: Sharif Tamez MD;  Location:  [facility]ATH [facility]    CV CENTRAL VENOUS CATHETER PLACEMENT N/A 12/31/2024    Procedure: Central Venous Catheter Placement;  Surgeon: Sharif Tamez MD;  Location:  HE[facility]ARD[facility] CATH [facility]    CV CORONARY ANGIOGRAM N/A 12/31/2024    Procedure: Coronary Angiogram;   Surgeon: Sharif Tamez MD;  Location:  HEART [facility]ARD[facility] CATH LAB    CV EXTRACORPERAL MEMBRANE OXYGENATION N/A 12/31/2024    Procedure: Extracorporeal Membrance Oxygenation;  Surgeon: Sharif Tamez MD;  Location:  HEART CARDI[facility] CATH LAB    DECOMPRESSION CUBITAL TUNNEL Left 08/31/2023    Procedure: LEFT CUBITAL TUNNEL RELEASE,;  Surgeon: Deny Dixon MD;  [place] [facility] [place]    ENDOSCOPY UPPER, COLONOSCOPY, COMBINED  10/19/2011    Procedure:COMBINED ENDOSCOPY UPPER, COLONOSCOPY; Upper Endoscopy, Colonoscopy with Polypectomy x4; Surgeon:AMBAR RODRÍGUEZ; Location: OR    ENT SURGERY  01/2016    Ongoing since then    ESOPHAGOSCOPY, GASTROSCOPY, DUODENOSCOPY (EGD), COMBINED  03/28/2014    Procedure: COMBINED ESOPHAGOSCOPY, GASTROSCOPY, DUODENOSCOPY (EGD);  EGD, Gastric Biopsies, Esophageal Banding;  Surgeon: Reyna Tovar MD;  Location:  OR    ESOPHAGOSCOPY, GASTROSCOPY, DUODENOSCOPY (EGD), COMBINED  06/09/2014    Procedure: COMBINED ESOPHAGOSCOPY, GASTROSCOPY, DUODENOSCOPY (EGD);  Surgeon: Curtis Mendez MD;  Location:  GI    ESOPHAGOSCOPY, GASTROSCOPY, DUODENOSCOPY (EGD), COMBINED  07/24/2014    Procedure: COMBINED ESOPHAGOSCOPY, GASTROSCOPY, DUODENOSCOPY (EGD);  Surgeon: Gerard Samaniego MD;  Location:  OR    ESOPHAGOSCOPY, GASTROSCOPY, DUODENOSCOPY (EGD), COMBINED N/A 10/31/2014    Procedure: COMBINED ESOPHAGOSCOPY, GASTROSCOPY, DUODENOSCOPY (EGD);  Surgeon: Gerard Samaniego MD;  Location:  OR    ESOPHAGOSCOPY, GASTROSCOPY, DUODENOSCOPY (EGD), COMBINED N/A 05/12/2016    Procedure: COMBINED ESOPHAGOSCOPY, GASTROSCOPY, DUODENOSCOPY (EGD);  Surgeon: Ana Paula Urbina MD;  Location:  GI    ESOPHAGOSCOPY, GASTROSCOPY, DUODENOSCOPY (EGD), COMBINED N/A 08/02/2018    Procedure: COMBINED ESOPHAGOSCOPY, GASTROSCOPY, DUODENOSCOPY (EGD);  EGD;  Surgeon: Yu Wagner MD;  [facility]    HCL SQUAMOUS CELL CARCINOMA AG  10/2007    left forearm    HERNIORRHAPHY  UMBILICAL  11/08/2012    Procedure: HERNIORRHAPHY UMBILICAL;  Open Umbilical Hernia Repair With Mesh ;  Surgeon: Chase Nicholson MD;  [facility]    INSERT STIMULATOR DORSAL COLUMN N/A 06/28/2018    Procedure: INSERT STIMULATOR DORSAL COLUMN;;  Surgeon: Elvis Sauceda MD;  [facility]    IR FLUORO 0-1 HOUR  1/23/2025    IR LOWER EXTREMITY ANGIOGRAM RIGHT  04/23/2024    neuro stimulator  2010    PICC DOUBLE LUMEN PLACEMENT Left 01/16/2025    Lateral brachial, 47 cm, 3 cm external length    RELEASE CARPAL TUNNEL Left 08/31/2023    Procedure: LEFT OPEN CARPAL TUNNEL RELEASE,;  Surgeon: Deny Dixon MD;  Location: [facility] OR    RELEASE TRIGGER FINGER Left 08/31/2023    Procedure: Release left trigger finger thumb;  Surgeon: Deny Dixon MD;  Location: [facility] OR    REMOVE EXTRACORPORAL MEMBRANE OXYGENATOR ADULT N/A 01/03/2025    Procedure: Remove Left Leg Peripheral Extracorporal Membrane Oxygenator and Closure;  Surgeon: Bernice Rosenbaum MD;  [facility]    REMOVE GENERATOR STIMULATOR (LOCATION) N/A 06/28/2018    Procedure: REMOVE GENERATOR STIMULATOR (LOCATION);  Spinal Cord Stimulator and IPG Explant and Re-Implant of SCS System (Leads and IPG);  Surgeon: Elvis Sauceda MD;  [facility]    REPAIR TENDON ACHILLES Right 11/11/2020    Procedure: Right achilles debridement and partial calcaneus excision;  Surgeon: Eh Sandoval MD;  Location: [facility] OR    SURGICAL HISTORY OF -   01/2002    abcess tooth    SURGICAL HISTORY OF -   1999    L4-5 laminectomy, cauda equina syndrome    SURGICAL HISTORY OF -   +    herniated disk repair    TONSILLECTOMY  10/1964    TRANSPOSITION ULNAR NERVE (ELBOW)      right    ZZC APPENDECTOMY  1974         Dose / Directions   acetaminophen 325 MG tablet  Commonly known as: TYLENOL  Used for: S/P CABG x 4      Dose: 650 mg  Take 2 tablets (650 mg) by mouth every 8 hours as needed for mild pain.  Refills: 0     albuterol 108 (90  Base) MCG/ACT inhaler  Commonly known as: PROAIR HFA/PROVENTIL HFA/VENTOLIN HFA  Used for: Moderate persistent asthma without complication      INHALE 2 PUFFS BY MOUTH EVERY 6 HOURS AS NEEDED FOR SHORTNESS OF BREATH OR WHEEZING  Quantity: 18 g  Refills: 11     aspirin 81 MG chewable tablet  Commonly known as: ASA  Used for: S/P CABG x 4      Dose: 81 mg  Take 1 tablet (81 mg) by mouth daily.  Quantity: 30 tablet  Refills: 0     baclofen 10 MG tablet  Commonly known as: LIORESAL  Used for: Chronic pain syndrome      Dose: 10 mg  Take 1 tablet (10 mg) by mouth 3 times daily as needed for muscle spasms.  Quantity: 20 tablet  Refills: 0     calcium carbonate 500 MG chewable tablet  Commonly known as: TUMS  Used for: Gastroesophageal reflux disease without esophagitis, C. difficile colitis, Norovirus      Dose: 1 chew tab  Take 1 tablet (500 mg) by mouth 3 times daily as needed for heartburn.  Quantity: 30 tablet  Refills: 0     gabapentin 300 MG capsule  Commonly known as: NEURONTIN  Used for: S/P CABG x 4      Give gabapentin 300mg at 1600 and take 3 tablets (900mg) daily at Hs  Quantity: 360 capsule  Refills: 3     glucose 40 % (400 mg/mL) gel  Used for: S/P CABG x 4      Dose: 15-30 g  Take 15-30 g by mouth every 15 minutes as needed for low blood sugar.  Quantity: 37.5 mL  Refills: 0     insulin glargine 100 UNIT/ML pen  Commonly known as: LANTUS PEN  Used for: S/P CABG x 4      Dose: 7 Units  Inject 7 Units subcutaneously at bedtime. HOLD if Blood Glucose<120  Quantity: 3 mL  Refills: 3     losartan 25 MG tablet  Commonly known as: COZAAR  Used for: S/P CABG x 4      Dose: 25 mg  Take 1 tablet (25 mg) by mouth daily. HOLD if SBP<100  Quantity: 90 tablet  Refills: 1     methocarbamol 500 MG tablet  Commonly known as: ROBAXIN  Used for: S/P CABG x 4      Dose: 500 mg  Take 1 tablet (500 mg) by mouth 3 times daily as needed for muscle spasms.  Quantity: 30 tablet  Refills: 0     metoprolol tartrate 50 MG  tablet  Commonly known as: LOPRESSOR  Used for: S/P CABG x 4      Dose: 50 mg  Take 1 tablet (50 mg) by mouth 2 times daily. HOLD if SBP<100 and/or HR<55  Quantity: 180 tablet  Refills: 0     multivitamin, therapeutic Tabs tablet  Used for: S/P CABG x 4      Dose: 1 tablet  Take 1 tablet by mouth daily.  Quantity: 30 tablet  Refills: 0     ondansetron 4 MG tablet  Commonly known as: ZOFRAN  Used for: Nausea without vomiting, Gastroparesis      Dose: 4 mg  Take 1 tablet (4 mg) by mouth 2 times daily as needed for nausea.  Quantity: 20 tablet  Refills: 0     oxyCODONE IR 10 MG tablet  Commonly known as: ROXICODONE  Used for: Chronic pain syndrome, H/O foot surgery, Intractable right heel pain, Rupture of right Achilles tendon, initial encounter, Acquired calcaneus deformity of right ankle, Chronic pain of right ankle, Calcific Achilles tendinitis, Chronic, continuous use of opioids, Continuous opioid dependence (H)      Dose: 10 mg  Take 1 tablet (10 mg) by mouth 3 times daily as needed for severe pain. #90 for one month supply  Quantity: 90 tablet  Refills: 0     pantoprazole 40 MG EC tablet  Commonly known as: PROTONIX  Used for: Gastroesophageal reflux disease without esophagitis      Dose: 40 mg  Take 1 tablet (40 mg) by mouth daily.  Quantity: 30 tablet  Refills: 0     ramelteon 8 MG tablet  Commonly known as: ROZEREM  Used for: S/P CABG x 4      Dose: 8 mg  Take 1 tablet (8 mg) by mouth at bedtime.  Quantity: 30 tablet  Refills: 0     rosuvastatin 40 MG tablet  Commonly known as: CRESTOR  Used for: Hyperlipidemia LDL goal <100      Dose: 40 mg  Take 1 tablet (40 mg) by mouth daily.  Quantity: 90 tablet  Refills: 0     senna-docusate 8.6-50 MG tablet  Commonly known as: SENOKOT-S/PERICOLACE  Used for: S/P CABG x 4      Dose: 1 tablet  Take 1 tablet by mouth or Feeding Tube 2 times daily as needed for constipation.  Quantity: 30 tablet  Refills: 0     sertraline 100 MG tablet  Commonly known as: ZOLOFT  Used for:  GENERALIZED ANXIETY DIS      TAKE 2 TABLETS BY MOUTH DAILY  Quantity: 60 tablet  Refills: 0     tamsulosin 0.4 MG capsule  Commonly known as: FLOMAX  Used for: S/P CABG x 4      Dose: 0.4 mg  Take 1 capsule (0.4 mg) by mouth daily.  Quantity: 30 capsule  Refills: 0     thiamine 100 MG tablet  Commonly known as: B-1  Used for: S/P CABG x 4      Dose: 100 mg  Take 1 tablet (100 mg) by mouth daily.  Quantity: 30 tablet  Refills: 0     triamterene-HCTZ 37.5-25 MG capsule  Commonly known as: DYAZIDE  Used for: Hypertension goal BP (blood pressure) < 130/80      Dose: 1 capsule  Take 1 capsule by mouth every morning. HOLD if SBP<100  Quantity: 90 capsule  Refills: 0           Dose / Directions   blood glucose monitoring meter device kit  Commonly known as: NO BRAND SPECIFIED  Used for: Type 2 diabetes, HbA1c goal < 7% (H)      Use to test blood sugar 3 times daily or as directed. Preferred blood glucose meter OR supplies to accompany: Blood Glucose Monitor Brands: per insurance.  Quantity: 1 kit  Refills: 0     blood glucose test strip  Commonly known as: NO BRAND SPECIFIED  Used for: Type 2 diabetes, HbA1c goal < 7% (H)      Use to test blood sugar 3 times daily or as directed. To accompany: Blood Glucose Monitor Brands: per insurance.  Quantity: 100 strip  Refills: 2     medical cannabis (Patient's own supply)      See Admin Instructions (The purpose of this order is to document that the patient reports taking medical cannabis.  This is not a prescription, and is not used to certify that the patient has a qualifying medical condition.)   Flower - PRN  Refills: 0     thin lancets  Commonly known as: NO BRAND SPECIFIED  Used for: Type 2 diabetes, HbA1c goal < 7% (H)      Use with lanceting device. To accompany: Blood Glucose Monitor Brands: per insurance.  Quantity: 100 each  Refills: 2              Past medical history, past surgical history, medications, and allergies were reviewed with the patient. Additional pertinent  items: None    Family History   Problem Relation Age of Onset    Cancer Mother         lung    Breast Cancer Mother     Other Cancer Mother     Cancer Father         esophogeal, GERD    Snoring Father     Diabetes Brother         amputation, Type 1    Diabetes Brother     Diabetes Brother     Cancer - colorectal No family hx of     Glaucoma No family hx of     Macular Degeneration No family hx of     Anesthesia Reaction No family hx of     Venous thrombosis No family hx of        Social History     Tobacco Use    Smoking status: Former     Current packs/day: 0.00     Types: Cigarettes     Start date: 10/13/1983     Quit date: 1984     Years since quittin.8     Passive exposure: Past    Smokeless tobacco: Never   Substance Use Topics    Alcohol use: Not Currently     Comment: - last drink was 3/28/14     Social history was reviewed with the patient. Additional pertinent items: None    Allergies   Allergen Reactions    Levaquin Anaphylaxis and Rash     Swelling in lip and tongue felt thick    Lisinopril Anaphylaxis     Swollen tongue; inability to swallow; drooling; hives; swollen face, neck, angioedema    Acetaminophen      Hx of cirrhosis     Amlodipine      Swelling, hives, possible angioedema      Morphine      b/p dropped and arms went numb  Hypotension    Quinolones Hives    Spironolactone Unknown     Pt believes himself to be allergic to it; cannot find his old records of this.-mjc     Bactrim [Sulfamethoxazole-Trimethoprim] Rash       Review of Systems  A medically appropriate review of systems was performed with pertinent positives and negatives noted in the HPI, and all other systems negative.    Physical Exam   BP: (!) 172/100  Pulse: 112  Resp: 18  SpO2: 98 %      Physical Exam  Vitals and nursing note reviewed.   HENT:      Head: Atraumatic.   Eyes:      Extraocular Movements: Extraocular movements intact.      Pupils: Pupils are equal, round, and reactive to light.   Cardiovascular:      Rate  and Rhythm: Tachycardia present.   Pulmonary:      Breath sounds: Normal breath sounds.   Abdominal:      Palpations: Abdomen is soft.      Tenderness: There is no abdominal tenderness.   Neurological:      General: No focal deficit present.      Mental Status: He is alert.   Psychiatric:         Mood and Affect: Mood normal.         ED Course     Orders Placed This Encounter   Procedures    XR Chest 2 Views    US Abdomen Limited    Comprehensive metabolic panel    [place] Draw    Troponin T, High Sensitivity    Lipase    CBC with platelets and differential    Extra Blue Top Tube    Extra Red Top Tube    UA with Microscopic reflex to Culture    Troponin T, High Sensitivity    Glucose by meter    EKG 12-lead, tracing only    Peripheral IV: Standard    Pulse oximetry nursing    Cardiac Continuous Monitoring    Review medications with patient    Glucose monitor nursing POCT    Oxygen: Nasal cannula, Oxygen mask    CBC with Platelets & Differential          Procedures       EKG revealed a sinus tachycardia at a rate of 103 with a FL interval of 0.156 and a QRS duration of 0.082.  The patient had a leftward axis with no acute ST or T wave changes significant for ischemia.  This is read by me personally.       Medications   ondansetron (ZOFRAN) injection 4 mg (4 mg Intravenous $Given 3/26/25 1645)   sodium chloride 0.9% BOLUS 500 mL (0 mLs Intravenous Stopped 3/26/25 1730)   LORazepam (ATIVAN) injection 1 mg (1 mg Intravenous Not Given 3/26/25 1755)         Results for orders placed or performed during the hospital encounter of 03/26/25 (from the past 24 hours)   EKG 12-lead, tracing only   Result Value Ref Range    Systolic Blood Pressure  mmHg    Diastolic Blood Pressure  mmHg    Ventricular Rate 103 BPM    Atrial Rate 103 BPM    FL Interval 156 ms    QRS Duration 82 ms     ms    QTc 479 ms    P Axis 57 degrees    R AXIS -8 degrees    T Axis 85 degrees    Interpretation ECG       Sinus tachycardia  Septal infarct  , age undetermined  Abnormal ECG     CBC with Platelets & Differential    Narrative    The following orders were created for panel order CBC with Platelets & Differential.  Procedure                               Abnormality         Status                     ---------                               -----------         ------                     CBC with platelets and ...[[id number]]                      Final result                 Please view results for these tests on the individual orders.   Comprehensive metabolic panel   Result Value Ref Range    Sodium 141 135 - 145 mmol/L    Potassium 3.2 (L) 3.4 - 5.3 mmol/L    Carbon Dioxide (CO2) 21 (L) 22 - 29 mmol/L    Anion Gap 16 (H) 7 - 15 mmol/L    Urea Nitrogen 12.4 8.0 - 23.0 mg/dL    Creatinine 0.52 (L) 0.67 - 1.17 mg/dL    GFR Estimate >90 >60 mL/min/1.73m2    Calcium 10.0 8.8 - 10.4 mg/dL    Chloride 104 98 - 107 mmol/L    Glucose 142 (H) 70 - 99 mg/dL    Alkaline Phosphatase 167 (H) 40 - 150 U/L    AST 26 0 - 45 U/L    ALT 19 0 - 70 U/L    Protein Total 7.9 6.4 - 8.3 g/dL    Albumin 4.4 3.5 - 5.2 g/dL    Bilirubin Total 0.5 <=1.2 mg/dL   [place] Draw    Narrative    The following orders were created for panel order [place] Draw.  Procedure                               Abnormality         Status                     ---------                               -----------         ------                     Extra Blue Top Tube[[id number]]                             In process                 Extra Red Top Tube[[id number]]                              In process                   Please view results for these tests on the individual orders.   Troponin T, High Sensitivity   Result Value Ref Range    Troponin T, High Sensitivity 33 (H) <=22 ng/L   Lipase   Result Value Ref Range    Lipase 38 13 - 60 U/L   CBC with platelets and differential   Result Value Ref Range    WBC Count 9.3 4.0 - 11.0 10e3/uL    RBC Count 5.24 4.40 - 5.90 10e6/uL    Hemoglobin 14.5 13.3 - 17.7  g/dL    Hematocrit 42.9 40.0 - 53.0 %    MCV 82 78 - 100 fL    MCH 27.7 26.5 - 33.0 pg    MCHC 33.8 31.5 - 36.5 g/dL    RDW 13.7 10.0 - 15.0 %    Platelet Count 253 150 - 450 10e3/uL    % Neutrophils 79 %    % Lymphocytes 13 %    % Monocytes 6 %    % Eosinophils 1 %    % Basophils 1 %    % Immature Granulocytes 0 %    NRBCs per 100 WBC 0 <1 /100    Absolute Neutrophils 7.3 1.6 - 8.3 10e3/uL    Absolute Lymphocytes 1.2 0.8 - 5.3 10e3/uL    Absolute Monocytes 0.5 0.0 - 1.3 10e3/uL    Absolute Eosinophils 0.1 0.0 - 0.7 10e3/uL    Absolute Basophils 0.1 0.0 - 0.2 10e3/uL    Absolute Immature Granulocytes 0.0 <=0.4 10e3/uL    Absolute NRBCs 0.0 10e3/uL   UA with Microscopic reflex to Culture    Specimen: Urine, Midstream   Result Value Ref Range    Color Urine Light Yellow Colorless, Straw, Light Yellow, Yellow    Appearance Urine Clear Clear    Glucose Urine Negative Negative mg/dL    Bilirubin Urine Negative Negative    Ketones Urine Negative Negative mg/dL    Specific Gravity Urine 1.015 1.003 - 1.035    Blood Urine Negative Negative    pH Urine 6.0 5.0 - 7.0    Protein Albumin Urine Negative Negative mg/dL    Urobilinogen Urine Normal Normal mg/dL    Nitrite Urine Negative Negative    Leukocyte Esterase Urine Negative Negative    Mucus Urine Present (A) None Seen /LPF    RBC Urine <1 <=2 /HPF    WBC Urine 2 <=5 /HPF    Narrative    Urine Culture not indicated   XR Chest 2 Views    Narrative    EXAM: XR CHEST 2 VIEWS  LOCATION: [facility]  DATE: 3/26/2025    INDICATION: vomiting  COMPARISON: 1/28/2025      Impression    IMPRESSION: Sternotomy. Atrial appendage clip. Elevation right hemidiaphragm. Lungs clear.   Glucose by meter   Result Value Ref Range    GLUCOSE BY METER POCT 142 (H) 70 - 99 mg/dL     *Note: Due to a large number of results and/or encounters for the requested time period, some results have not been displayed. A complete set of results can be found in  Results Review.     Second troponin pending    Right upper quadrant ultrasound pending to follow-up on the patient's previous history of gallstone however the patient does not wish to have this study done and wishes to go home.         Critical care was not performed.     Medical Decision Making  The patient's presentation was of moderate complexity (an acute illness with systemic symptoms).    The patient's evaluation involved:  strong consideration of a test (right upper quadrant ultrasound) that was ultimately deferred  ordering and/or review of 3+ test(s) in this encounter (see above)    The patient's management necessitated high risk (as the patient wishes to leave before his second troponin is done and his ultrasound is done).      Assessments & Plan (with Medical Decision Making)     I have reviewed the nursing notes.    Patient improved with medications given here in the ER and his workup was basically unremarkable.  /83   Pulse 112   Resp 18   SpO2 100%   Patient at this time wishes to go home prior to his evaluation being complete and he wishes to finish it as an outpatient with his primary care    I have reviewed the findings, and differential diagnosis with the patient.      New Prescriptions    PROCHLORPERAZINE (COMPAZINE) 10 MG TABLET    Take 1 tablet (10 mg) by mouth every 8 hours as needed for nausea or vomiting.       Final diagnoses:   Nausea and vomiting, unspecified vomiting type - possible biliary colic     Please make an appointment to follow up with Your Primary Care Provider in 1-2 days if not better.    Check your MyChart for your on resulted test results.    Keep hydrated    Routine discharge instructions were given for this diagnosis    JIMENA LINCOLN MD    3/26/2025   [facility] EMERGENCY DEPARTMENT       Jimena Lincoln MD  03/26/25 [id number]

## 2025-03-26 NOTE — ED NOTES
Pt called for nurse, asking for food/juice due to feeling of low BG. BG POCT performed, . Writer explained need to wait for ED assessments to be performed prior to po intake. Pt apprehensive about this, stating he has eaten nothing since yesterday and took Lantus last night. Limited learning observed, may need reinforcement if pt continues to be ice chips only.

## 2025-03-27 ENCOUNTER — PATIENT OUTREACH (OUTPATIENT)
Dept: CARE COORDINATION | Facility: CLINIC | Age: 65
End: 2025-03-27
Payer: MEDICARE

## 2025-03-27 LAB
ATRIAL RATE - MUSE: 103 BPM
DIASTOLIC BLOOD PRESSURE - MUSE: NORMAL MMHG
INTERPRETATION ECG - MUSE: NORMAL
P AXIS - MUSE: 57 DEGREES
PR INTERVAL - MUSE: 156 MS
QRS DURATION - MUSE: 82 MS
QT - MUSE: 366 MS
QTC - MUSE: 479 MS
R AXIS - MUSE: -8 DEGREES
SYSTOLIC BLOOD PRESSURE - MUSE: NORMAL MMHG
T AXIS - MUSE: 85 DEGREES
VENTRICULAR RATE- MUSE: 103 BPM

## 2025-03-27 NOTE — PROGRESS NOTES
[facility]    Background: Primary Care-Care Coordination initial outreach identified per system criteria and reviewed by [facility] team.    Assessment: Upon chart review, [facility] Team member will not proceed with patient outreach related to this episode of Primary Care-Care Coordination program due to reason below:    Primary Care Clinic Care Coordination will defer follow-up outreach to specialty care team who are already closely following patient.     Plan: Primary Care-Care Coordination episode addressed appropriately per reason noted above.      Coco Bean MA  [facility] [place], [facility]    *Connected Care Resource Team does NOT follow patient ongoing. Referrals are identified based on internal discharge reports and the outreach is to ensure patient has an understanding of their discharge instructions.

## 2025-03-27 NOTE — DISCHARGE INSTRUCTIONS
Please make an appointment to follow up with Your Primary Care Provider in 1-2 days if not better.    Check your MyChart for your on resulted test results.    Keep hydrated

## 2025-04-01 ENCOUNTER — MYC MEDICAL ADVICE (OUTPATIENT)
Dept: CARDIOLOGY | Facility: CLINIC | Age: 65
End: 2025-04-01
Payer: MEDICARE

## 2025-04-01 ENCOUNTER — TRANSFERRED RECORDS (OUTPATIENT)
Dept: HEALTH INFORMATION MANAGEMENT | Facility: CLINIC | Age: 65
End: 2025-04-01
Payer: MEDICARE

## 2025-04-02 ENCOUNTER — MYC MEDICAL ADVICE (OUTPATIENT)
Dept: CARDIOLOGY | Facility: CLINIC | Age: 65
End: 2025-04-02
Payer: MEDICARE

## 2025-04-02 NOTE — TELEPHONE ENCOUNTER
[facility] Call Center    Phone Message    May a detailed message be left on voicemail: yes     Reason for Call: Other: Patient called back. Patient wants to know why he needs a F/U? Patient stats that we know the info on what is going on. Patient declined to schedule at this time. If nursing would maybe reach out? Thank you      Action Taken: Other: cardiology     Travel Screening: Not Applicable     Date of Service:

## 2025-04-04 ENCOUNTER — TELEPHONE (OUTPATIENT)
Dept: CARDIOLOGY | Facility: CLINIC | Age: 65
End: 2025-04-04
Payer: MEDICARE

## 2025-04-07 ENCOUNTER — TELEPHONE (OUTPATIENT)
Dept: PHYSICAL MEDICINE AND REHAB | Facility: CLINIC | Age: 65
End: 2025-04-07
Payer: MEDICARE

## 2025-04-07 NOTE — CONFIDENTIAL NOTE
Pre-visit phone call-   VM left for the pt with the following appointment information.       LPN called pt and reminded them of their upcoming appointment on Tuesday 4/8/25 at 12:30 pm with Dr. Lira in the Amputee clinic.       Pt was reminded of where the clinic was located:     We are located in the [facility] at [address] [place] [address], [address].  Our building is located across the street from [facility] and offers free parking in our on-site lot.  Please take the stairs or elevator to the second floor of the [facility] and check in at the  located in the Specialty Clinic, [facility].   From Sign In Desk:     Department Address: [address] [place] [address], [address] [address]   Department Phone: [phone]     Kathleen Ryan LPN

## 2025-04-08 ENCOUNTER — OFFICE VISIT (OUTPATIENT)
Dept: PHYSICAL MEDICINE AND REHAB | Facility: CLINIC | Age: 65
End: 2025-04-08
Payer: MEDICARE

## 2025-04-08 VITALS
HEIGHT: 71 IN | BODY MASS INDEX: 21.42 KG/M2 | HEART RATE: 75 BPM | WEIGHT: 153 LBS | SYSTOLIC BLOOD PRESSURE: 102 MMHG | DIASTOLIC BLOOD PRESSURE: 54 MMHG

## 2025-04-08 DIAGNOSIS — Z89.511 S/P BELOW KNEE AMPUTATION, RIGHT (H): Primary | ICD-10-CM

## 2025-04-08 DIAGNOSIS — Z95.1 S/P CABG X 4: ICD-10-CM

## 2025-04-08 RX ORDER — GABAPENTIN 300 MG/1
600 CAPSULE ORAL 3 TIMES DAILY
Qty: 540 CAPSULE | Refills: 3 | Status: SHIPPED | OUTPATIENT
Start: 2025-04-08

## 2025-04-08 ASSESSMENT — PAIN SCALES - GENERAL: PAINLEVEL_OUTOF10: SEVERE PAIN (7)

## 2025-04-08 NOTE — PATIENT INSTRUCTIONS
We will start with addressing your hamstring pain with continuous 4 times a day voltaren gel for a month.  You can also apply it to the end of the tibia, but you have to clean that off before wearing the socket.  We can try capsaicin cream if the voltaren isn't working well.    For potential nerve pain contributing, I have increased gabapentin to 600mg three times daily. If this makes you feel very sleepy, then we'll need to further titrate the dose.    If none of this is working we may need to image the leg or refer to vascular to check the blood flow, especially if the pain worsens with activity rather than just wearing the socket.  Reconnect with your pain management specialist for a second opinion on the right hamstring pain in particular and to possibly reprogram your stimulator to improve socket tolerance.

## 2025-04-08 NOTE — PROGRESS NOTES
PM&R Followup Note     Patient Name: Erwin Aguilar : 1960 Medical Record: [id number]     Amputee Information:  Level of Amputation:  Right transtibial  : Jaren Garcia  Date of Surgery: 24  Etiology: Failed femoral artery stent placement         Subjective:     Last visit summary:   I ordered a right transtibial K3 prosthesis    Interval History:   - He's had multiple hospital admissions for CAD since the last visit. He was admitted from 24 - 25 for VT arrest requiring CPR and ECMO, followed by CABG on 25.  He discharged to a TCU then went home for cardiac rehab.  - He then went to the ED again on 3/26/25 for vomiting, thought to be biliary colic  - Since getting his prosthesis, he had trouble fitting in the socket without severe pain and skin damage.  He stated that his tibia was sticking out, causing the prosthesis to fit poorly.  - He's been frustrated by the pain from failed back surgery for his cauda equina syndrome and the state of the tibia in his residual limb, which causes severe pain when walking for more than 2 hours a day.  - He received his custom wheelchair.  - He wants an answer of whether the prosthesis can work or not    Some of the leg pain he's been having is the same as last visit, especially the pain in the back of the leg which was limiting him before he even had the amputation. He gets nerve pain behind his knee. He's taking between 7479-3391 gabapentin per day, 300 at 4pm and 900 at night.  He is especially tender at the distal anterior tibia.  The bone hitting the socket and rubbing down the socket when donning it, he gets high pain.  When he's wearing it, the pain behind his knee predominates. It's an electric type pain that goes up and down his leg, but not up to his back.  He hasn't worn it for two weeks and hasn't had any behind the knee pain since.  He has a pain stimulator, but this needs to be reprogrammed to cover the pain going down the lower  leg.    He wants to wear the socket for 6 hours a day.    He is currently able to stand on his left leg without issue.         Medications:     Current Outpatient Medications   Medication Sig Dispense Refill    acetaminophen (TYLENOL) 325 MG tablet Take 2 tablets (650 mg) by mouth every 8 hours as needed for mild pain. (Patient not taking: Reported on 2/26/2025)      albuterol (PROAIR HFA/PROVENTIL HFA/VENTOLIN HFA) 108 (90 Base) MCG/ACT inhaler INHALE 2 PUFFS BY MOUTH EVERY 6 HOURS AS NEEDED FOR SHORTNESS OF BREATH OR WHEEZING 18 g 11    aspirin (ASA) 81 MG chewable tablet Take 1 tablet (81 mg) by mouth daily. 30 tablet 0    baclofen (LIORESAL) 10 MG tablet Take 1 tablet (10 mg) by mouth 3 times daily as needed for muscle spasms. 20 tablet 0    blood glucose (NO BRAND SPECIFIED) test strip Use to test blood sugar 3 times daily or as directed. To accompany: Blood Glucose Monitor Brands: per insurance. 100 strip 2    blood glucose monitoring (NO BRAND SPECIFIED) meter device kit Use to test blood sugar 3 times daily or as directed. Preferred blood glucose meter OR supplies to accompany: Blood Glucose Monitor Brands: per insurance. 1 kit 0    calcium carbonate (TUMS) 500 MG chewable tablet Take 1 tablet (500 mg) by mouth 3 times daily as needed for heartburn. (Patient not taking: Reported on 2/26/2025) 30 tablet 0    eplerenone (INSPRA) 25 MG tablet Take 1 tablet (25 mg) by mouth daily. 30 tablet 1    gabapentin (NEURONTIN) 300 MG capsule Give gabapentin 300mg at 1600 and take 3 tablets (900mg) daily at Hs 360 capsule 3    glucose 40 % (400 mg/mL) gel Take 15-30 g by mouth every 15 minutes as needed for low blood sugar. (Patient not taking: Reported on 2/26/2025) 37.5 mL 0    insulin glargine (LANTUS PEN) 100 UNIT/ML pen Inject 7 Units subcutaneously at bedtime. HOLD if Blood Glucose<120 3 mL 3    losartan (COZAAR) 25 MG tablet Take 1 tablet (25 mg) by mouth daily. HOLD if SBP<100 90 tablet 1    medical cannabis  (Patient's own supply) See Admin Instructions (The purpose of this order is to document that the patient reports taking medical cannabis.  This is not a prescription, and is not used to certify that the patient has a qualifying medical condition.)   Flower - PRN      methocarbamol (ROBAXIN) 500 MG tablet Take 1 tablet (500 mg) by mouth 3 times daily as needed for muscle spasms. 30 tablet 0    metoprolol tartrate (LOPRESSOR) 50 MG tablet Take 1 tablet (50 mg) by mouth 2 times daily. HOLD if SBP<100 and/or HR<55 180 tablet 0    multivitamin, therapeutic (THERA-VIT) TABS tablet Take 1 tablet by mouth daily. 30 tablet 0    ondansetron (ZOFRAN) 4 MG tablet Take 1 tablet (4 mg) by mouth 2 times daily as needed for nausea. 60 tablet 0    oxyCODONE IR (ROXICODONE) 10 MG tablet Take 1 tablet (10 mg) by mouth 3 times daily as needed for severe pain. #90 for one month supply 90 tablet 0    pantoprazole (PROTONIX) 40 MG EC tablet Take 1 tablet (40 mg) by mouth daily. 90 tablet 3    prochlorperazine (COMPAZINE) 10 MG tablet Take 1 tablet (10 mg) by mouth every 8 hours as needed for nausea or vomiting. 12 tablet 0    ramelteon (ROZEREM) 8 MG tablet Take 1 tablet (8 mg) by mouth at bedtime. 30 tablet 0    rosuvastatin (CRESTOR) 40 MG tablet Take 1 tablet (40 mg) by mouth daily. 90 tablet 0    senna-docusate (SENOKOT-S/PERICOLACE) 8.6-50 MG tablet Take 1 tablet by mouth or Feeding Tube 2 times daily as needed for constipation. (Patient not taking: Reported on 2/26/2025) 30 tablet 0    sertraline (ZOLOFT) 100 MG tablet TAKE 2 TABLETS BY MOUTH DAILY 60 tablet 0    tamsulosin (FLOMAX) 0.4 MG capsule Take 1 capsule (0.4 mg) by mouth daily. 30 capsule 0    thiamine (B-1) 100 MG tablet Take 1 tablet (100 mg) by mouth daily. 30 tablet 0    thin (NO BRAND SPECIFIED) lancets Use with lanceting device. To accompany: Blood Glucose Monitor Brands: per insurance. 100 each 2    triamterene-HCTZ (DYAZIDE) 37.5-25 MG capsule Take 1 capsule by  "mouth every morning. HOLD if SBP<100 90 capsule 0            Allergies:     Allergies   Allergen Reactions    Levaquin Anaphylaxis and Rash     Swelling in lip and tongue felt thick    Lisinopril Anaphylaxis     Swollen tongue; inability to swallow; drooling; hives; swollen face, neck, angioedema    Acetaminophen      Hx of cirrhosis     Amlodipine      Swelling, hives, possible angioedema      Morphine      b/p dropped and arms went numb  Hypotension    Quinolones Hives    Spironolactone Unknown     Pt believes himself to be allergic to it; cannot find his old records of this.-mjc     Bactrim [Sulfamethoxazole-Trimethoprim] Rash            ROS:     A focused ROS is negative other than the symptoms noted above in the HPI.         Physical Examination:     VITAL SIGNS: There were no vitals taken for this visit.  BMI: Estimated body mass index is 22.32 kg/m  as calculated from the following:    Height as of 2/17/25: 1.803 m (5' 11\").    Weight as of 2/26/25: 72.6 kg (160 lb).  Gait: Deferred, prosthesis broken  Strength:   HFlex HEx HAdd HAbd KEx KFlex ADF APF   R 5 4 5 5 5 5 NA NA   L 5 5 5 5 5 5 5 5   Sensation:   Sensation to light touch intact in the BLE  Residual right leg: Pain and dry skin in distal anterior tibia terminus surface - the bone isn't excessively close to the skin, but just lightly brushing the skin causes pain.  The skin on the underside of the incision is not painful but is ruborous, blanchable.  Left lower extremity: No deformities or tenderness  Prosthesis: Broken check socket.  Pin liner suspension.         Labs:     Lipids  Recent Labs   Lab Test 04/23/24  1155 01/09/24  0932 10/21/22  1006 02/12/22  1113   CHOL 143 177 176 161   LDL 70 113* 105* 86   HDL 60 43 47 36*   TRIG 65 105 120 194*     HgbA1c  Recent Labs   Lab Test 12/31/24  1327 05/03/24  1105 01/09/24  0932 08/10/23  0710   A1C 5.7* 5.9* 5.9* 6.7*            Assessment/Plan:     Erwin Aguilar is a 64 year old male with a relevant " PMH of osteoarthritis, DM2, cirrhosis, cauda equina syndrome, and chronic pain who had a right transtibial amputation on 5/9/24 due to peripheral vascular disease.     #Right TTA  MSK pain from his distal tibia and neuropathic pain from his cauda equina syndrome complicate socket fitting.  Pain could be multifactorial: Deconditioning / muscle strain in hamstrings, neuropathic pain from cauda equina / failed back surgery, and/or exercise induced limb ischemia are in the differential. Physical exam most consistent with MSK issue, which will be tackled with topicals.  In addition, neuropathic pain management should be titrated for 24 hour exposure.  If not making sufficient headway in # of hours in prosthesis tolerance, may consider diagnoses of exclusion like CRPS or neuroma and consider imaging.  - PT for standing tolerance / gait tolerance in prosthesis, further pain assessment and management using therapeutic adaptations,  - Prosthesis: check socket needs complete revision due to breaking.  Consider different foot to reduce impact force and experiment more with adjusting trimlines over increasing space in anterior terminal tibia, as the former impacts socket tolerance more.  - diclofenac 4x daily for 30 days  - increase gabapentin to 600mg TID  - if diclofenac not satisfactory, switch to capsaicin cream  - Patient will see pain management physician for re-assessment of targeted pain management of right leg to better tolerate socket. Potential treatments include adjusting spinal stimulator to target right lower extremity dermatomes / myotomes    #Deconditioning  Significant cardiac deconditioning related to lack of ambulatory capability while recovering from 1/2025 CAD with VT arrest.  - PT referral as above for developing upper body HEP to prevent further deconditioning and maintain mobility    #Follow up  - 1 month    Roland Lira MD  Physical Medicine & Rehabilitation    I spent a total of 58 minutes on  the date of service doing chart review, history and exam, documentation, and further activities as noted above.

## 2025-04-08 NOTE — LETTER
2025       RE: Erwin Aguilar  [address]  [place] [address]     Dear Colleague,    Thank you for referring your patient, Erwin Aguilar, to the [facility] PHYSICAL MEDICINE AND REHABILITATION CLINIC [place] at [facility]. Please see a copy of my visit note below.         PM&R Followup Note     Patient Name: Erwin Aguilar : 1960 Medical Record: [id number]     Amputee Information:  Level of Amputation:  Right transtibial  : Jaren Fruvivek  Date of Surgery: 24  Etiology: Failed femoral artery stent placement         Subjective:     Last visit summary:   I ordered a right transtibial K3 prosthesis    Interval History:   - He's had multiple hospital admissions for CAD since the last visit. He was admitted from 24 - 25 for VT arrest requiring CPR and ECMO, followed by CABG on 25.  He discharged to a TCU then went home for cardiac rehab.  - He then went to the ED again on 3/26/25 for vomiting, thought to be biliary colic  - Since getting his prosthesis, he had trouble fitting in the socket without severe pain and skin damage.  He stated that his tibia was sticking out, causing the prosthesis to fit poorly.  - He's been frustrated by the pain from failed back surgery for his cauda equina syndrome and the state of the tibia in his residual limb, which causes severe pain when walking for more than 2 hours a day.  - He received his custom wheelchair.  - He wants an answer of whether the prosthesis can work or not    Some of the leg pain he's been having is the same as last visit, especially the pain in the back of the leg which was limiting him before he even had the amputation. He gets nerve pain behind his knee. He's taking between 5376-8184 gabapentin per day, 300 at 4pm and 900 at night.  He is especially tender at the distal anterior tibia.  The bone hitting the socket and rubbing down the socket when donning it, he  gets high pain.  When he's wearing it, the pain behind his knee predominates. It's an electric type pain that goes up and down his leg, but not up to his back.  He hasn't worn it for two weeks and hasn't had any behind the knee pain since.  He has a pain stimulator, but this needs to be reprogrammed to cover the pain going down the lower leg.    He wants to wear the socket for 6 hours a day.    He is currently able to stand on his left leg without issue.         Medications:     Current Outpatient Medications   Medication Sig Dispense Refill     acetaminophen (TYLENOL) 325 MG tablet Take 2 tablets (650 mg) by mouth every 8 hours as needed for mild pain. (Patient not taking: Reported on 2/26/2025)       albuterol (PROAIR HFA/PROVENTIL HFA/VENTOLIN HFA) 108 (90 Base) MCG/ACT inhaler INHALE 2 PUFFS BY MOUTH EVERY 6 HOURS AS NEEDED FOR SHORTNESS OF BREATH OR WHEEZING 18 g 11     aspirin (ASA) 81 MG chewable tablet Take 1 tablet (81 mg) by mouth daily. 30 tablet 0     baclofen (LIORESAL) 10 MG tablet Take 1 tablet (10 mg) by mouth 3 times daily as needed for muscle spasms. 20 tablet 0     blood glucose (NO BRAND SPECIFIED) test strip Use to test blood sugar 3 times daily or as directed. To accompany: Blood Glucose Monitor Brands: per insurance. 100 strip 2     blood glucose monitoring (NO BRAND SPECIFIED) meter device kit Use to test blood sugar 3 times daily or as directed. Preferred blood glucose meter OR supplies to accompany: Blood Glucose Monitor Brands: per insurance. 1 kit 0     calcium carbonate (TUMS) 500 MG chewable tablet Take 1 tablet (500 mg) by mouth 3 times daily as needed for heartburn. (Patient not taking: Reported on 2/26/2025) 30 tablet 0     eplerenone (INSPRA) 25 MG tablet Take 1 tablet (25 mg) by mouth daily. 30 tablet 1     gabapentin (NEURONTIN) 300 MG capsule Give gabapentin 300mg at 1600 and take 3 tablets (900mg) daily at Hs 360 capsule 3     glucose 40 % (400 mg/mL) gel Take 15-30 g by mouth  every 15 minutes as needed for low blood sugar. (Patient not taking: Reported on 2/26/2025) 37.5 mL 0     insulin glargine (LANTUS PEN) 100 UNIT/ML pen Inject 7 Units subcutaneously at bedtime. HOLD if Blood Glucose<120 3 mL 3     losartan (COZAAR) 25 MG tablet Take 1 tablet (25 mg) by mouth daily. HOLD if SBP<100 90 tablet 1     medical cannabis (Patient's own supply) See Admin Instructions (The purpose of this order is to document that the patient reports taking medical cannabis.  This is not a prescription, and is not used to certify that the patient has a qualifying medical condition.)   Flower - PRN       methocarbamol (ROBAXIN) 500 MG tablet Take 1 tablet (500 mg) by mouth 3 times daily as needed for muscle spasms. 30 tablet 0     metoprolol tartrate (LOPRESSOR) 50 MG tablet Take 1 tablet (50 mg) by mouth 2 times daily. HOLD if SBP<100 and/or HR<55 180 tablet 0     multivitamin, therapeutic (THERA-VIT) TABS tablet Take 1 tablet by mouth daily. 30 tablet 0     ondansetron (ZOFRAN) 4 MG tablet Take 1 tablet (4 mg) by mouth 2 times daily as needed for nausea. 60 tablet 0     oxyCODONE IR (ROXICODONE) 10 MG tablet Take 1 tablet (10 mg) by mouth 3 times daily as needed for severe pain. #90 for one month supply 90 tablet 0     pantoprazole (PROTONIX) 40 MG EC tablet Take 1 tablet (40 mg) by mouth daily. 90 tablet 3     prochlorperazine (COMPAZINE) 10 MG tablet Take 1 tablet (10 mg) by mouth every 8 hours as needed for nausea or vomiting. 12 tablet 0     ramelteon (ROZEREM) 8 MG tablet Take 1 tablet (8 mg) by mouth at bedtime. 30 tablet 0     rosuvastatin (CRESTOR) 40 MG tablet Take 1 tablet (40 mg) by mouth daily. 90 tablet 0     senna-docusate (SENOKOT-S/PERICOLACE) 8.6-50 MG tablet Take 1 tablet by mouth or Feeding Tube 2 times daily as needed for constipation. (Patient not taking: Reported on 2/26/2025) 30 tablet 0     sertraline (ZOLOFT) 100 MG tablet TAKE 2 TABLETS BY MOUTH DAILY 60 tablet 0     tamsulosin  "(FLOMAX) 0.4 MG capsule Take 1 capsule (0.4 mg) by mouth daily. 30 capsule 0     thiamine (B-1) 100 MG tablet Take 1 tablet (100 mg) by mouth daily. 30 tablet 0     thin (NO BRAND SPECIFIED) lancets Use with lanceting device. To accompany: Blood Glucose Monitor Brands: per insurance. 100 each 2     triamterene-HCTZ (DYAZIDE) 37.5-25 MG capsule Take 1 capsule by mouth every morning. HOLD if SBP<100 90 capsule 0            Allergies:     Allergies   Allergen Reactions     Levaquin Anaphylaxis and Rash     Swelling in lip and tongue felt thick     Lisinopril Anaphylaxis     Swollen tongue; inability to swallow; drooling; hives; swollen face, neck, angioedema     Acetaminophen      Hx of cirrhosis      Amlodipine      Swelling, hives, possible angioedema       Morphine      b/p dropped and arms went numb  Hypotension     Quinolones Hives     Spironolactone Unknown     Pt believes himself to be allergic to it; cannot find his old records of this.-mjc      Bactrim [Sulfamethoxazole-Trimethoprim] Rash            ROS:     A focused ROS is negative other than the symptoms noted above in the HPI.         Physical Examination:     VITAL SIGNS: There were no vitals taken for this visit.  BMI: Estimated body mass index is 22.32 kg/m  as calculated from the following:    Height as of 2/17/25: 1.803 m (5' 11\").    Weight as of 2/26/25: 72.6 kg (160 lb).  Gait: Deferred, prosthesis broken  Strength:   HFlex HEx HAdd HAbd KEx KFlex ADF APF   R 5 4 5 5 5 5 NA NA   L 5 5 5 5 5 5 5 5   Sensation:   Sensation to light touch intact in the BLE  Residual right leg: Pain and dry skin in distal anterior tibia terminus surface - the bone isn't excessively close to the skin, but just lightly brushing the skin causes pain.  The skin on the underside of the incision is not painful but is ruborous, blanchable.  Left lower extremity: No deformities or tenderness  Prosthesis: Broken check socket.  Pin liner suspension.         Labs: "     Lipids  Recent Labs   Lab Test 04/23/24  1155 01/09/24  0932 10/21/22  1006 02/12/22  1113   CHOL 143 177 176 161   LDL 70 113* 105* 86   HDL 60 43 47 36*   TRIG 65 105 120 194*     HgbA1c  Recent Labs   Lab Test 12/31/24  1327 05/03/24  1105 01/09/24  0932 08/10/23  0710   A1C 5.7* 5.9* 5.9* 6.7*            Assessment/Plan:     Erwin Aguilar is a 64 year old male with a relevant PMH of osteoarthritis, DM2, cirrhosis, cauda equina syndrome, and chronic pain who had a right transtibial amputation on 5/9/24 due to peripheral vascular disease.     #Right TTA  MSK pain from his distal tibia and neuropathic pain from his cauda equina syndrome complicate socket fitting.  Pain could be multifactorial: Deconditioning / muscle strain in hamstrings, neuropathic pain from cauda equina / failed back surgery, and/or exercise induced limb ischemia are in the differential. Physical exam most consistent with MSK issue, which will be tackled with topicals.  In addition, neuropathic pain management should be titrated for 24 hour exposure.  If not making sufficient headway in # of hours in prosthesis tolerance, may consider diagnoses of exclusion like CRPS or neuroma and consider imaging.  - PT for standing tolerance / gait tolerance in prosthesis, further pain assessment and management using therapeutic adaptations,  - Prosthesis: check socket needs complete revision due to breaking.  Consider different foot to reduce impact force and experiment more with adjusting trimlines over increasing space in anterior terminal tibia, as the former impacts socket tolerance more.  - diclofenac 4x daily for 30 days  - increase gabapentin to 600mg TID  - if diclofenac not satisfactory, switch to capsaicin cream  - Patient will see pain management physician for re-assessment of targeted pain management of right leg to better tolerate socket. Potential treatments include adjusting spinal stimulator to target right lower extremity dermatomes /  myotomes    #Deconditioning  Significant cardiac deconditioning related to lack of ambulatory capability while recovering from 1/2025 CAD with VT arrest.  - PT referral as above for developing upper body HEP to prevent further deconditioning and maintain mobility    #Follow up  - 1 month    Roland Lira MD  Physical Medicine & Rehabilitation    I spent a total of 58 minutes on the date of service doing chart review, history and exam, documentation, and further activities as noted above.      Again, thank you for allowing me to participate in the care of your patient.      Sincerely,    Roland Lira MD

## 2025-04-10 ENCOUNTER — TELEPHONE (OUTPATIENT)
Dept: ANESTHESIOLOGY | Facility: CLINIC | Age: 65
End: 2025-04-10
Payer: MEDICARE

## 2025-04-10 NOTE — TELEPHONE ENCOUNTER
[facility] Call Center    Phone Message    May a detailed message be left on voicemail: yes     Reason for Call: Pt said that he had an amputation and he needs his pain stimulator adjusted.  He said that [employer] will need to be there to help with the adjustment.  Please call Pt back to schedule.

## 2025-04-10 NOTE — TELEPHONE ENCOUNTER
RN returned call and spoke with patient. He informed that he had a right below the knee amputation. He is having pain in his back and behind the knee. He would like to schedule a follow up with Dr. Grimm and see the [employer] rep as well for reprogramming his Spinal Cord Stimulator. Writer scheduled patient for 4/29 at 9:15 am. Patient verbalized understanding. Writer will send message to [employer] rep.      Ruchi Cherry RN

## 2025-04-11 NOTE — PROGRESS NOTES
Erwin is a 63 year old who is being evaluated via a billable video visit.    How would you like to obtain your AVS? Pancho  If the video visit is dropped, the invitation should be resent by: Text to cell phone: [phone]  Will anyone else be joining your video visit? No      Assessment & Plan       Had amputation scheduled, canceled due to vascular concerns, vascular surgery then amputation.     Had surgery 3:00 Thursday quite uncomfortable so went home Friday evening.     Sunday/Monday feeling much better.     Reviewed picture of amputation he sent.     Does not have home health and spouse has been very ill so cannot help.  Does have first firt nurse visit tomorrow.  Does have wheelchair and is taking of self well.     Feeling very optimistic about future.     10 years alcohol free in March!     I will send in doxazocin refills. 8 mg    Continue oxycodone 10mg three times daily for now will let me know when/if needing less.     Follow up three months for chronic pain and anytime before then for annual wellness/blood pressue check since blood pressue high last checked in chart.       S/P below knee amputation, right (H)      Atherosclerosis of native arteries of extremities with rest pain, other extremity (H)      Chronic pain syndrome      H/O foot surgery      Intractable right heel pain      Rupture of right Achilles tendon, initial encounter      Acquired calcaneus deformity of right ankle      Chronic pain of right ankle      Calcific Achilles tendinitis    The longitudinal plan of care for the diagnosis(es)/condition(s) as documented were addressed during this visit. Due to the added complexity in care, I will continue to support Erwin in the subsequent management and with ongoing continuity of care.          MED REC REQUIRED  Post Medication Reconciliation Status:  Discharge medications reconciled, continue medications without change        Subjective   Erwin is a 63 year old, presenting for the following  health issues:  No chief complaint on file.        5/16/2024     3:08 PM   Additional Questions   Roomed by RODERICK Malone       Video Start Time:  3:41    History of Present Illness       Reason for visit:  Hospital follow up an pain meds    He eats 4 or more servings of fruits and vegetables daily.He consumes 1 sweetened beverage(s) daily.He exercises with enough effort to increase his heart rate 30 to 60 minutes per day.  He exercises with enough effort to increase his heart rate 6 days per week. He is missing 1 dose(s) of medications per week.  He is not taking prescribed medications regularly due to other.         Hospital Follow-up Visit:    Hospital/Nursing Home/IP Rehab Facility: [facility]  Date of Admission: 5/9/2024  Date of Discharge: 5/10/2024  Reason(s) for Admission: achilles tendon infection, Pt reports right below knee amputation  Was the patient in the ICU or did the patient experience delirium during hospitalization?  No  Do you have any other stressors you would like to discuss with your provider? OTHER: Pt reports not being able to receive home care has been a difficulty    Problems taking medications regularly:  Pt reports not taking miralax and senna-docusate as he reports they are not needed. Pt reports not taking robaxin as he reports he has lost the bottle - Pt reports he plans to start when he finds the bottle. Pt reports not taking doxazosin as he finished his previous prescription.   Medication changes since discharge: (START) hydroxyzine 25 mg every 6 hours PRN, robaxin 500 mg 4 times daily, miralax 17 g daily, senna-docusate 1 tablet 2 times daily (CONTINUE WITH CHANGES) aspirin 81 mg daily  Problems adhering to non-medication therapy:  None    Summary of hospitalization:  [facility] discharge summary reviewed  Diagnostic Tests/Treatments reviewed.  Follow up needed: none  Other Healthcare Providers Involved in Patient s Care:          None  Update since discharge: improved.         Plan of care communicated with patient                     Objective           Vitals:  No vitals were obtained today due to virtual visit.    Physical Exam   GENERAL: alert and no distress  EYES: Eyes grossly normal to inspection.  No discharge or erythema, or obvious scleral/conjunctival abnormalities.  RESP: No audible wheeze, cough, or visible cyanosis.    SKIN: Visible skin clear. No significant rash, abnormal pigmentation or lesions.  NEURO: Cranial nerves grossly intact.  Mentation and speech appropriate for age.  PSYCH: Appropriate affect, tone, and pace of words          Video-Visit Details    Type of service:  Video Visit   Video End Time:3:58 PM  Originating Location (pt. Location): Home    Distant Location (provider location):  On-site  Platform used for Video Visit: Kameron  Signed Electronically by: Joel Daniel Wegener, MD     show

## 2025-04-18 ENCOUNTER — MYC REFILL (OUTPATIENT)
Dept: FAMILY MEDICINE | Facility: CLINIC | Age: 65
End: 2025-04-18

## 2025-04-18 DIAGNOSIS — F11.20 CONTINUOUS OPIOID DEPENDENCE (H): ICD-10-CM

## 2025-04-18 DIAGNOSIS — Z98.890 H/O FOOT SURGERY: ICD-10-CM

## 2025-04-18 DIAGNOSIS — M21.6X1 ACQUIRED CALCANEUS DEFORMITY OF RIGHT ANKLE: ICD-10-CM

## 2025-04-18 DIAGNOSIS — F11.90 CHRONIC, CONTINUOUS USE OF OPIOIDS: ICD-10-CM

## 2025-04-18 DIAGNOSIS — M79.671 INTRACTABLE RIGHT HEEL PAIN: ICD-10-CM

## 2025-04-18 DIAGNOSIS — G89.4 CHRONIC PAIN SYNDROME: ICD-10-CM

## 2025-04-18 DIAGNOSIS — M25.571 CHRONIC PAIN OF RIGHT ANKLE: ICD-10-CM

## 2025-04-18 DIAGNOSIS — G89.29 CHRONIC PAIN OF RIGHT ANKLE: ICD-10-CM

## 2025-04-18 DIAGNOSIS — M76.60 CALCIFIC ACHILLES TENDINITIS: ICD-10-CM

## 2025-04-18 DIAGNOSIS — S86.011A RUPTURE OF RIGHT ACHILLES TENDON, INITIAL ENCOUNTER: ICD-10-CM

## 2025-04-20 ENCOUNTER — MYC MEDICAL ADVICE (OUTPATIENT)
Dept: CARDIOLOGY | Facility: CLINIC | Age: 65
End: 2025-04-20
Payer: MEDICARE

## 2025-04-21 ENCOUNTER — TELEPHONE (OUTPATIENT)
Dept: CARDIOLOGY | Facility: CLINIC | Age: 65
End: 2025-04-21

## 2025-04-21 ENCOUNTER — MYC MEDICAL ADVICE (OUTPATIENT)
Dept: FAMILY MEDICINE | Facility: CLINIC | Age: 65
End: 2025-04-21

## 2025-04-21 ENCOUNTER — MYC MEDICAL ADVICE (OUTPATIENT)
Dept: CARDIOLOGY | Facility: CLINIC | Age: 65
End: 2025-04-21

## 2025-04-21 ENCOUNTER — VIRTUAL VISIT (OUTPATIENT)
Dept: CARDIOLOGY | Facility: CLINIC | Age: 65
End: 2025-04-21
Payer: MEDICARE

## 2025-04-21 DIAGNOSIS — I25.10 CORONARY ARTERY DISEASE INVOLVING NATIVE CORONARY ARTERY OF NATIVE HEART WITHOUT ANGINA PECTORIS: Primary | ICD-10-CM

## 2025-04-21 DIAGNOSIS — Z95.1 S/P CABG X 4: ICD-10-CM

## 2025-04-21 DIAGNOSIS — K31.84 GASTROPARESIS: Primary | ICD-10-CM

## 2025-04-21 DIAGNOSIS — R11.10 VOMITING: ICD-10-CM

## 2025-04-21 DIAGNOSIS — I10 HYPERTENSION GOAL BP (BLOOD PRESSURE) < 130/80: ICD-10-CM

## 2025-04-21 DIAGNOSIS — R11.0 NAUSEA: ICD-10-CM

## 2025-04-21 PROCEDURE — 98007 SYNCH AUDIO-VIDEO EST HI 40: CPT | Performed by: INTERNAL MEDICINE

## 2025-04-21 RX ORDER — OXYCODONE HYDROCHLORIDE 10 MG/1
10 TABLET ORAL 3 TIMES DAILY PRN
Qty: 90 TABLET | Refills: 0 | Status: SHIPPED | OUTPATIENT
Start: 2025-04-21 | End: 2025-04-24

## 2025-04-21 RX ORDER — METOPROLOL SUCCINATE 50 MG/1
50 TABLET, EXTENDED RELEASE ORAL DAILY
Qty: 90 TABLET | Refills: 3 | Status: SHIPPED | OUTPATIENT
Start: 2025-04-21

## 2025-04-21 NOTE — TELEPHONE ENCOUNTER
JW,   Please see message below.   Patient sent [employer] message on 4/15.   Thanks!  Livia CROCKER

## 2025-04-21 NOTE — PROGRESS NOTES
"    The patient has been notified of following:     \"This video visit will be conducted via a call between you and your physician/provider. We have found that certain health care needs can be provided without the need for an in-person physical exam.  This service lets us provide the care you need with a video conversation.  If a prescription is necessary we can send it directly to your pharmacy.  If lab work is needed we can place an order for that and you can then stop by our lab to have the test done at a later time.      Patient has given verbal consent to a Video visit? Yes    HEART CARE VIDEO ENCOUNTER      [facility]  [phone]    The patient has chosen to have the visit conducted as a video visit. This video visit is being conducted via a call between the patient and physician/provider. Health care needs are being provided without a physical exam.     Assessment/Recommendations   Assessment/Plan:  Coronary artery disease with history of acute coronary syndrome/incessant ventricular arrhythmias /cardiogenic shock/ LVAD support /coronary artery bypass graft surgery(LIMA to LAD and vein grafts to obtuse marginal ramus intermedius and distal RCA) in January 2025-no angina  History of paroxysmal atrial fibrillation status post surgical maze and left atrial appendage clip the time of the CABG  Severe malnutrition  Peripheral arterial disease status post bilateral lower extremity angioplasty and right BKA  Hypercholesterolemia on rosuvastatin  Hypertension, improved control  Alcoholic liver disease with history of portal hypertension and esophageal bleeding, well controlled  Diabetes mellitus  Recurrent nausea and vomiting likely diabetic gastroparesis  Fatigue    Reduce metoprolol to 50 mg extended release once a day  We will see if we can arrange for referral to gastric motility specialist  Follow Up Plan:  3 months  Total time of video between patient and provider was 20 minutes "       Originating Location (pt. Location): Home    Distant Location (provider location): [place], [place]    Mode of Communication:  Video Conference via [employer]       History of Present Illness/Subjective    Erwin Aguilar is a 64 year old male who is being evaluated via a billable video visit and has consented to a video visit.     He reports no exertional chest pain.  He has started to doing some physical therapy.  He is weight has been stable.  He has no PND and orthopnea.  He has had problems with recurrent nausea and vomiting.  He went to the emergency room at [facility].  He thinks that he may have gastroparesis.  He would like to be referred to gastric motility specialist  He tolerates medications well      I have reviewed and updated the patient's Past Medical History, Social History, Family History and Medication List.       Physical Examination performed via live video encounter Review of Systems   General Appearance:   no distress, normal body habitus, upright.   ENT/Mouth: membranes moist, no nasal discharge or bleeding gums.  Normal head shape, no evidence of injury or laceration.     EYES:  no scleral icterus, normal conjunctivae   Neck: no evidence of thyromegaly.  Supple   Chest/Lungs:   No audible wheezing equal chest wall expansion. Non labored breathing.  No cough.   Cardiovascular:   No evidence of elevated jugular venous pressure.  No evidence of pitting edema bilaterally    Abdomen:  no evidence of abdominal distention. No observe juandice.     Extremities: no cyanosis or clubbing noted.    Skin: no xanthelasma, normal skin color. No evidence of facial lacerations.      Neurologic: Normal arm motion bilateral, no tremors.  No evidence of focal defect.       Psychiatric: alert and oriented x3, calm                Medical History  Surgical History Family History Social History   Past Medical History:   Diagnosis Date    Actinic keratosis     aldara    Anxiety 12/1997    Cancer (H)     squamous cell skin  CA    Cauda equina spinal cord injury (H)     Chronic sinusitis 05/01/2016    Cirrhosis of liver (H) 04/01/2014    Not biopsy-proven as of 4/1/14.      Depressive disorder     Diastasis recti     Esophageal reflux     Esophageal varices in cirrhosis (H) 04/01/2014    Hospitalized for UGI blee 3/28/14, endoscopy revealed bleeding varices.    Essential hypertension, benign     Intermittent asthma     Mild depression     Mixed hyperlipidemia     Nasal polyps 05/01/2016    Nausea vomiting and diarrhea 1/28/2025    Osteoarthritis of lumbar spine, unspecified spinal osteoarthritis complication status     Other chronic pain     Paroxysmal atrial fibrillation (H) 09/22/2021    SCCA (squamous cell carcinoma) of skin     Seasonal allergic conjunctivitis     Type II or unspecified type diabetes mellitus without mention of complication, not stated as uncontrolled     Unspecified site of spinal cord injury without evidence of spinal bone injury     due to back surgery     Past Surgical History:   Procedure Laterality Date    ABDOMEN SURGERY  2014    AMPUTATE LEG BELOW KNEE Right 05/09/2024    Procedure: Right below knee amputation (transtibial amputation);  Surgeon: Kurtis Nye MD;  [facility]    ANGIOGRAM Right 04/23/2024    Procedure: Ultrasound guided percutaneous transarterial left common femoral artery access, diagnostic aortoiliac angiogram, selective cannulation of right comon iliac, righ external iliac, right common femoral, and right superficial femoral artery, balloon angioplasty of right superficial femoral artery, 6mm x 12 cm self-expanding drug-coated stent placement of right superficial femoral artery, lef    BACK SURGERY  08/2009    BYPASS GRAFT ARTERY CORONARY N/A 01/02/2025    Procedure: Median Sternotomy, on Cardiopulmonary Bypass Pump, Left Internal Mammary Artery Essex, Endoscopic Essex of Left Greater Saphenous Vein, Coronary Artery Bypass Graft x 4, Left Atrial Appendage Ligation, Left Atrial  Ablation, and Intraoperative Transesophageal Echocardiogram By Anesthesia;  Surgeon: Bernice Rosenbaum MD;  [facility]    COLONOSCOPY N/A 05/12/2016    Procedure: COMBINED COLONOSCOPY, SINGLE OR MULTIPLE BIOPSY/POLYPECTOMY BY BIOPSY;  Surgeon: Ana Paula Urbina MD;  Location:  GI    CV ARTERIAL LINE PLACEMENT N/A 12/31/2024    Procedure: Arterial Line Placement;  Surgeon: Sharif Tamez MD;  Location:  HEART CARDIAC CATH LAB    CV CENTRAL VENOUS CATHETER PLACEMENT N/A 12/31/2024    Procedure: Central Venous Catheter Placement;  Surgeon: Sharif Tamez MD;  Location:  HEART CARDIAC CATH LAB    CV CORONARY ANGIOGRAM N/A 12/31/2024    Procedure: Coronary Angiogram;  Surgeon: Sharif Tamez MD;  Location:  HEART CARDIAC CATH LAB    CV EXTRACORPERAL MEMBRANE OXYGENATION N/A 12/31/2024    Procedure: Extracorporeal Membrance Oxygenation;  Surgeon: Sharif Tamez MD;  Location:  HEART CARDIAC CATH LAB    DECOMPRESSION CUBITAL TUNNEL Left 08/31/2023    Procedure: LEFT CUBITAL TUNNEL RELEASE,;  Surgeon: Deny Dixon MD;  Location: [facility] OR    ENDOSCOPY UPPER, COLONOSCOPY, COMBINED  10/19/2011    Procedure:COMBINED ENDOSCOPY UPPER, COLONOSCOPY; Upper Endoscopy, Colonoscopy with Polypectomy x4; Surgeon:AMBAR RODRÍGUEZ; Location: OR    ENT SURGERY  01/2016    Ongoing since then    ESOPHAGOSCOPY, GASTROSCOPY, DUODENOSCOPY (EGD), COMBINED  03/28/2014    Procedure: COMBINED ESOPHAGOSCOPY, GASTROSCOPY, DUODENOSCOPY (EGD);  EGD, Gastric Biopsies, Esophageal Banding;  Surgeon: Reyna Tovar MD;  Location:  OR    ESOPHAGOSCOPY, GASTROSCOPY, DUODENOSCOPY (EGD), COMBINED  06/09/2014    Procedure: COMBINED ESOPHAGOSCOPY, GASTROSCOPY, DUODENOSCOPY (EGD);  Surgeon: Curtis Mendez MD;  Location:  GI    ESOPHAGOSCOPY, GASTROSCOPY, DUODENOSCOPY (EGD), COMBINED  07/24/2014    Procedure: COMBINED ESOPHAGOSCOPY, GASTROSCOPY, DUODENOSCOPY (EGD);  Surgeon: Gerard Samaniego MD;  Location:   OR    ESOPHAGOSCOPY, GASTROSCOPY, DUODENOSCOPY (EGD), COMBINED N/A 10/31/2014    Procedure: COMBINED ESOPHAGOSCOPY, GASTROSCOPY, DUODENOSCOPY (EGD);  Surgeon: Gerard Samaniego MD;  [facility]    ESOPHAGOSCOPY, GASTROSCOPY, DUODENOSCOPY (EGD), COMBINED N/A 05/12/2016    Procedure: COMBINED ESOPHAGOSCOPY, GASTROSCOPY, DUODENOSCOPY (EGD);  Surgeon: Ana Paula Urbina MD;  [facility]    ESOPHAGOSCOPY, GASTROSCOPY, DUODENOSCOPY (EGD), COMBINED N/A 08/02/2018    Procedure: COMBINED ESOPHAGOSCOPY, GASTROSCOPY, DUODENOSCOPY (EGD);  EGD;  Surgeon: Yu Wagner MD;  [facility] GI    HCL SQUAMOUS CELL CARCINOMA AG  10/2007    left forearm    HERNIORRHAPHY UMBILICAL  11/08/2012    Procedure: HERNIORRHAPHY UMBILICAL;  Open Umbilical Hernia Repair With Mesh ;  Surgeon: Chase Nicholson MD;  [facility]    INSERT STIMULATOR DORSAL COLUMN N/A 06/28/2018    Procedure: INSERT STIMULATOR DORSAL COLUMN;;  Surgeon: Elvis Sauceda MD;  [facility]    IR FLUORO 0-1 HOUR  1/23/2025    IR LOWER EXTREMITY ANGIOGRAM RIGHT  04/23/2024    neuro stimulator  2010    PICC DOUBLE LUMEN PLACEMENT Left 01/16/2025    Lateral brachial, 47 cm, 3 cm external length    RELEASE CARPAL TUNNEL Left 08/31/2023    Procedure: LEFT OPEN CARPAL TUNNEL RELEASE,;  Surgeon: Deny Dixon MD;  Location: [facility] OR    RELEASE TRIGGER FINGER Left 08/31/2023    Procedure: Release left trigger finger thumb;  Surgeon: Deny Dixon MD;  Location: [facility] OR    REMOVE EXTRACORPORAL MEMBRANE OXYGENATOR ADULT N/A 01/03/2025    Procedure: Remove Left Leg Peripheral Extracorporal Membrane Oxygenator and Closure;  Surgeon: Bernice Rosenbaum MD;  [facility]    REMOVE GENERATOR STIMULATOR (LOCATION) N/A 06/28/2018    Procedure: REMOVE GENERATOR STIMULATOR (LOCATION);  Spinal Cord Stimulator and IPG Explant and Re-Implant of SCS System (Leads and IPG);  Surgeon: Elvis Sauceda MD;   [facility]    REPAIR TENDON ACHILLES Right 2020    Procedure: Right achilles debridement and partial calcaneus excision;  Surgeon: Eh Sandoval MD;  Location: [facility] OR    SURGICAL HISTORY OF -   2002    abcess tooth    SURGICAL HISTORY OF -       L4-5 laminectomy, cauda equina syndrome    SURGICAL HISTORY OF -   +    herniated disk repair    TONSILLECTOMY  10/1964    TRANSPOSITION ULNAR NERVE (ELBOW)      right    ZZC APPENDECTOMY  1974     Family History   Problem Relation Age of Onset    Cancer Mother         lung    Breast Cancer Mother     Other Cancer Mother     Cancer Father         esophogeal, GERD    Snoring Father     Diabetes Brother         amputation, Type 1    Diabetes Brother     Diabetes Brother     Cancer - colorectal No family hx of     Glaucoma No family hx of     Macular Degeneration No family hx of     Anesthesia Reaction No family hx of     Venous thrombosis No family hx of         Social History     Socioeconomic History    Marital status: Single     Spouse name: Not on file    Number of children: 0    Years of education: Not on file    Highest education level: Not on file   Occupational History    Occupation: sales     Employer: UNEMPLOYED   Tobacco Use    Smoking status: Former     Current packs/day: 0.00     Types: Cigarettes     Start date: 10/13/1983     Quit date: 1984     Years since quittin.8     Passive exposure: Past    Smokeless tobacco: Never   Vaping Use    Vaping status: Every Day    Substances: THC, CBD    Devices: Refillable tank   Substance and Sexual Activity    Alcohol use: Not Currently     Comment: - last drink was 3/28/14    Drug use: Yes     Frequency: 2.0 times per week     Types: Marijuana     Comment: medical marijuana - vape daily    Sexual activity: Not Currently     Partners: Female     Birth control/protection: Abstinence   Other Topics Concern    Parent/sibling w/ CABG, MI or angioplasty before 65F 55M? No   Social History  Narrative    Not on file     Social Drivers of Health     Financial Resource Strain: Unknown (1/2/2025)    Financial Resource Strain     Within the past 12 months, have you or your family members you live with been unable to get utilities (heat, electricity) when it was really needed?: Patient unable to answer   Food Insecurity: Unknown (1/2/2025)    Food Insecurity     Within the past 12 months, did you worry that your food would run out before you got money to buy more?: Patient unable to answer     Within the past 12 months, did the food you bought just not last and you didn t have money to get more?: Patient unable to answer   Transportation Needs: Unknown (1/2/2025)    Transportation Needs     Within the past 12 months, has lack of transportation kept you from medical appointments, getting your medicines, non-medical meetings or appointments, work, or from getting things that you need?: Patient unable to answer   Physical Activity: Inactive (12/31/2020)    Exercise Vital Sign     Days of Exercise per Week: 0 days     Minutes of Exercise per Session: 0 min   Stress: Not on file   Social Connections: Unknown (12/31/2020)    Social Connection and Isolation Panel [NHANES]     Frequency of Communication with Friends and Family: Not on file     Frequency of Social Gatherings with Friends and Family: Not on file     Attends Anabaptism Services: Not on file     Active Member of Clubs or Organizations: Not on file     Attends Club or Organization Meetings: Not on file     Marital Status:    Interpersonal Safety: Low Risk  (2/17/2025)    Interpersonal Safety     Do you feel physically and emotionally safe where you currently live?: Patient declined     Within the past 12 months, have you been hit, slapped, kicked or otherwise physically hurt by someone?: No     Within the past 12 months, have you been humiliated or emotionally abused in other ways by your partner or ex-partner?: No   Housing Stability: Unknown  (1/2/2025)    Housing Stability     Do you have housing? : Patient unable to answer     Are you worried about losing your housing?: Patient unable to answer           Medications  Allergies   Current Outpatient Medications   Medication Sig Dispense Refill    acetaminophen (TYLENOL) 325 MG tablet Take 2 tablets (650 mg) by mouth every 8 hours as needed for mild pain.      albuterol (PROAIR HFA/PROVENTIL HFA/VENTOLIN HFA) 108 (90 Base) MCG/ACT inhaler INHALE 2 PUFFS BY MOUTH EVERY 6 HOURS AS NEEDED FOR SHORTNESS OF BREATH OR WHEEZING 18 g 11    aspirin (ASA) 81 MG chewable tablet Take 1 tablet (81 mg) by mouth daily. 30 tablet 0    baclofen (LIORESAL) 10 MG tablet Take 1 tablet (10 mg) by mouth 3 times daily as needed for muscle spasms. 20 tablet 0    blood glucose (NO BRAND SPECIFIED) test strip Use to test blood sugar 3 times daily or as directed. To accompany: Blood Glucose Monitor Brands: per insurance. 100 strip 2    blood glucose monitoring (NO BRAND SPECIFIED) meter device kit Use to test blood sugar 3 times daily or as directed. Preferred blood glucose meter OR supplies to accompany: Blood Glucose Monitor Brands: per insurance. 1 kit 0    calcium carbonate (TUMS) 500 MG chewable tablet Take 1 tablet (500 mg) by mouth 3 times daily as needed for heartburn. 30 tablet 0    diclofenac (VOLTAREN) 1 % topical gel Apply 4 g topically 4 times daily. 480 g 0    gabapentin (NEURONTIN) 300 MG capsule Take 2 capsules (600 mg) by mouth 3 times daily. 540 capsule 3    glucose 40 % (400 mg/mL) gel Take 15-30 g by mouth every 15 minutes as needed for low blood sugar. (Patient taking differently: Take by mouth every 15 minutes as needed for low blood sugar.) 37.5 mL 0    insulin glargine (LANTUS PEN) 100 UNIT/ML pen Inject 7 Units subcutaneously at bedtime. HOLD if Blood Glucose<120 3 mL 3    losartan (COZAAR) 25 MG tablet Take 1 tablet (25 mg) by mouth daily. HOLD if SBP<100 90 tablet 1    medical cannabis (Patient's own  supply) See Admin Instructions (The purpose of this order is to document that the patient reports taking medical cannabis.  This is not a prescription, and is not used to certify that the patient has a qualifying medical condition.)   Flower - PRN      methocarbamol (ROBAXIN) 500 MG tablet Take 1 tablet (500 mg) by mouth 3 times daily as needed for muscle spasms. 30 tablet 0    metoprolol succinate ER (TOPROL XL) 50 MG 24 hr tablet Take 1 tablet (50 mg) by mouth daily. 90 tablet 3    multivitamin, therapeutic (THERA-VIT) TABS tablet Take 1 tablet by mouth daily. 30 tablet 0    ondansetron (ZOFRAN) 4 MG tablet Take 1 tablet (4 mg) by mouth 2 times daily as needed for nausea. 60 tablet 0    oxyCODONE IR (ROXICODONE) 10 MG tablet Take 1 tablet (10 mg) by mouth 3 times daily as needed for severe pain. #90 for one month supply 90 tablet 0    pantoprazole (PROTONIX) 40 MG EC tablet Take 1 tablet (40 mg) by mouth daily. 90 tablet 3    ramelteon (ROZEREM) 8 MG tablet Take 1 tablet (8 mg) by mouth at bedtime. 30 tablet 0    rosuvastatin (CRESTOR) 40 MG tablet Take 1 tablet (40 mg) by mouth daily. 90 tablet 0    senna-docusate (SENOKOT-S/PERICOLACE) 8.6-50 MG tablet Take 1 tablet by mouth or Feeding Tube 2 times daily as needed for constipation. 30 tablet 0    sertraline (ZOLOFT) 100 MG tablet TAKE 2 TABLETS BY MOUTH DAILY 60 tablet 0    tamsulosin (FLOMAX) 0.4 MG capsule Take 1 capsule (0.4 mg) by mouth daily. 30 capsule 0    thiamine (B-1) 100 MG tablet Take 1 tablet (100 mg) by mouth daily. 30 tablet 0    thin (NO BRAND SPECIFIED) lancets Use with lanceting device. To accompany: Blood Glucose Monitor Brands: per insurance. 100 each 2    triamterene-HCTZ (DYAZIDE) 37.5-25 MG capsule Take 1 capsule by mouth every morning. HOLD if SBP<100 90 capsule 0       Allergies   Allergen Reactions    Levaquin Anaphylaxis and Rash     Swelling in lip and tongue felt thick    Lisinopril Anaphylaxis     Swollen tongue; inability to  "swallow; drooling; hives; swollen face, neck, angioedema    Acetaminophen      Hx of cirrhosis     Amlodipine      Swelling, hives, possible angioedema      Morphine      b/p dropped and arms went numb  Hypotension    Quinolones Hives    Spironolactone Unknown     Pt believes himself to be allergic to it; cannot find [place] [place]     Bactrim [Sulfamethoxazole-Trimethoprim] Rash          Lab Results    Chemistry/lipid CBC Cardiac Enzymes/BNP/TSH/INR   Recent Labs   Lab Test 04/23/24  1155   CHOL 143   HDL 60   LDL 70   TRIG 65     Recent Labs   Lab Test 04/23/24  1155 01/09/24  0932 10/21/22  1006   LDL 70 113* 105*     Recent Labs   Lab Test 03/26/25  1747 03/26/25  1629   NA  --  141   POTASSIUM  --  3.2*   CHLORIDE  --  104   CO2  --  21*   * 142*   BUN  --  12.4   CR  --  0.52*   GFRESTIMATED  --  >90   [place]  --  10.0     Recent Labs   Lab Test 03/26/25  1629 02/04/25  1052 01/28/25  0315   CR 0.52* 0.57* 0.58*     Recent Labs   Lab Test 12/31/24  1327 05/03/24  1105 01/09/24  0932   A1C 5.7* 5.9* 5.9*          Recent Labs   Lab Test 03/26/25  1629   WBC 9.3   HGB 14.5   HCT 42.9   MCV 82        Recent Labs   Lab Test 03/26/25  1629 02/04/25  1052 01/28/25  0315   HGB 14.5 11.2* 11.8*    No results for input(s): \"TROPONINI\" in the last 05062 hours.  No results for input(s): \"BNP\", \"NTBNPI\", \"NTBNP\" in the last 87958 hours.  Recent Labs   Lab Test 01/02/25  0333   TSH 0.48     Recent Labs   Lab Test 01/28/25  0315 01/22/25  0413 01/03/25  0336   INR 1.16* 1.37* 1.29*                                                                            "

## 2025-04-21 NOTE — TELEPHONE ENCOUNTER
----- Message from Markus Fox sent at 4/21/2025  9:11 AM CDT -----  He wants to be referred to gastric motility specialist within our system.  [facility]  If we do please refer patient  there

## 2025-04-21 NOTE — LETTER
"4/21/2025    Joel Daniel Wegener, MD  [address]  [facility] [address]    RE: Erwin Aguilar       Dear Colleague,     I had the pleasure of seeing Erwin Aguilar in the [facility].      The patient has been notified of following:     \"This video visit will be conducted via a call between you and your physician/provider. We have found that certain health care needs can be provided without the need for an in-person physical exam.  This service lets us provide the care you need with a video conversation.  If a prescription is necessary we can send it directly to your pharmacy.  If lab work is needed we can place an order for that and you can then stop by our lab to have the test done at a later time.      Patient has given verbal consent to a Video visit? Yes    HEART CARE VIDEO ENCOUNTER      [facility]  [phone]    The patient has chosen to have the visit conducted as a video visit. This video visit is being conducted via a call between the patient and physician/provider. Health care needs are being provided without a physical exam.     Assessment/Recommendations   Assessment/Plan:  Coronary artery disease with history of acute coronary syndrome/incessant ventricular arrhythmias /cardiogenic shock/ LVAD support /coronary artery bypass graft surgery(LIMA to LAD and vein grafts to obtuse marginal ramus intermedius and distal RCA) in January 2025-no angina  History of paroxysmal atrial fibrillation status post surgical maze and left atrial appendage clip the time of the CABG  Severe malnutrition  Peripheral arterial disease status post bilateral lower extremity angioplasty and right BKA  Hypercholesterolemia on rosuvastatin  Hypertension, improved control  Alcoholic liver disease with history of portal hypertension and esophageal bleeding, well controlled  Diabetes mellitus  Recurrent nausea and vomiting likely diabetic gastroparesis  Fatigue    Reduce " metoprolol to 50 mg extended release once a day  We will see if we can arrange for referral to gastric motility specialist  Follow Up Plan:  3 months  Total time of video between patient and provider was 20 minutes       Originating Location (pt. Location): Home    Distant Location (provider location): [place], [place]    Mode of Communication:  Video Conference via WedWu       History of Present Illness/Subjective    Erwin Aguilar is a 64 year old male who is being evaluated via a billable video visit and has consented to a video visit.     He reports no exertional chest pain.  He has started to doing some physical therapy.  He is weight has been stable.  He has no PND and orthopnea.  He has had problems with recurrent nausea and vomiting.  He went to the emergency room at [facility].  He thinks that he may have gastroparesis.  He would like to be referred to gastric motility specialist  He tolerates medications well      I have reviewed and updated the patient's Past Medical History, Social History, Family History and Medication List.       Physical Examination performed via live video encounter Review of Systems   General Appearance:   no distress, normal body habitus, upright.   ENT/Mouth: membranes moist, no nasal discharge or bleeding gums.  Normal head shape, no evidence of injury or laceration.     EYES:  no scleral icterus, normal conjunctivae   Neck: no evidence of thyromegaly.  Supple   Chest/Lungs:   No audible wheezing equal chest wall expansion. Non labored breathing.  No cough.   Cardiovascular:   No evidence of elevated jugular venous pressure.  No evidence of pitting edema bilaterally    Abdomen:  no evidence of abdominal distention. No observe juandice.     Extremities: no cyanosis or clubbing noted.    Skin: no xanthelasma, normal skin color. No evidence of facial lacerations.      Neurologic: Normal arm motion bilateral, no tremors.  No evidence of focal defect.       Psychiatric: alert and oriented  x3, calm                Medical History  Surgical History Family History Social History   Past Medical History:   Diagnosis Date     Actinic keratosis     aldara     Anxiety 12/1997     Cancer (H)     squamous cell skin CA     Cauda equina spinal cord injury (H)      Chronic sinusitis 05/01/2016     Cirrhosis of liver (H) 04/01/2014    Not biopsy-proven as of 4/1/14.       Depressive disorder      Diastasis recti      Esophageal reflux      Esophageal varices in cirrhosis (H) 04/01/2014    Hospitalized for UGI blee 3/28/14, endoscopy revealed bleeding varices.     Essential hypertension, benign      Intermittent asthma      Mild depression      Mixed hyperlipidemia      Nasal polyps 05/01/2016     Nausea vomiting and diarrhea 1/28/2025     Osteoarthritis of lumbar spine, unspecified spinal osteoarthritis complication status      Other chronic pain      Paroxysmal atrial fibrillation (H) 09/22/2021     SCCA (squamous cell carcinoma) of skin      Seasonal allergic conjunctivitis      Type II or unspecified type diabetes mellitus without mention of complication, not stated as uncontrolled      Unspecified site of spinal cord injury without evidence of spinal bone injury     due to back surgery     Past Surgical History:   Procedure Laterality Date     ABDOMEN SURGERY  2014     AMPUTATE LEG BELOW KNEE Right 05/09/2024    Procedure: Right below knee amputation (transtibial amputation);  Surgeon: Kurtis Nye MD;  [facility]     ANGIOGRAM Right 04/23/2024    Procedure: Ultrasound guided percutaneous transarterial left common femoral artery access, diagnostic aortoiliac angiogram, selective cannulation of right comon iliac, righ external iliac, right common femoral, and right superficial femoral artery, balloon angioplasty of right superficial femoral artery, 6mm x 12 cm self-expanding drug-coated stent placement of right superficial femoral artery, [facility]     BACK SURGERY  08/2009     BYPASS GRAFT ARTERY CORONARY N/A  01/02/2025    Procedure: Median Sternotomy, on Cardiopulmonary Bypass Pump, Left Internal Mammary Artery Medford, Endoscopic Medford of Left Greater Saphenous Vein, Coronary Artery Bypass Graft x 4, Left Atrial Appendage Ligation, Left Atrial Ablation, and Intraoperative Transesophageal Echocardiogram By Anesthesia;  Surgeon: Bernice Rosenbaum MD;  [facility]     COLONOSCOPY N/A 05/12/2016    Procedure: COMBINED COLONOSCOPY, SINGLE OR MULTIPLE BIOPSY/POLYPECTOMY BY BIOPSY;  Surgeon: Ana Paula Urbina MD;  Location:  GI     CV ARTERIAL LINE PLACEMENT N/A 12/31/2024    Procedure: Arterial Line Placement;  Surgeon: Sharif Tamez MD;  Location:  HEART CARDIAC CATH LAB     CV CENTRAL VENOUS CATHETER PLACEMENT N/A 12/31/2024    Procedure: Central Venous Catheter Placement;  Surgeon: Sharif Tamez MD;  Location:  HEART CARDIAC CATH LAB     CV CORONARY ANGIOGRAM N/A 12/31/2024    Procedure: Coronary Angiogram;  Surgeon: Sharif Tamez MD;  Location:  HEART CARDIAC CATH LAB     CV EXTRACORPERAL MEMBRANE OXYGENATION N/A 12/31/2024    Procedure: Extracorporeal Membrance Oxygenation;  Surgeon: Sharif Tamez MD;  Location:  HEART CARDIAC CATH LAB     DECOMPRESSION CUBITAL TUNNEL Left 08/31/2023    Procedure: LEFT CUBITAL TUNNEL RELEASE,;  Surgeon: Deny Dixon MD;  Location: [facility] OR     ENDOSCOPY UPPER, COLONOSCOPY, COMBINED  10/19/2011    Procedure:COMBINED ENDOSCOPY UPPER, COLONOSCOPY; Upper Endoscopy, Colonoscopy with Polypectomy x4; Surgeon:AMBAR RODRÍGUEZIQ; Location: OR     ENT SURGERY  01/2016    Ongoing since then     ESOPHAGOSCOPY, GASTROSCOPY, DUODENOSCOPY (EGD), COMBINED  03/28/2014    Procedure: COMBINED ESOPHAGOSCOPY, GASTROSCOPY, DUODENOSCOPY (EGD);  EGD, Gastric Biopsies, Esophageal Banding;  Surgeon: Reyna Tovar MD;  Location:  OR     ESOPHAGOSCOPY, GASTROSCOPY, DUODENOSCOPY (EGD), COMBINED  06/09/2014    Procedure: COMBINED ESOPHAGOSCOPY, GASTROSCOPY,  DUODENOSCOPY (EGD);  Surgeon: Curtis Mendez MD;  [facility]     ESOPHAGOSCOPY, GASTROSCOPY, DUODENOSCOPY (EGD), COMBINED  07/24/2014    Procedure: COMBINED ESOPHAGOSCOPY, GASTROSCOPY, DUODENOSCOPY (EGD);  Surgeon: Gerard Samaniego MD;  [facility]     ESOPHAGOSCOPY, GASTROSCOPY, DUODENOSCOPY (EGD), COMBINED N/A 10/31/2014    Procedure: COMBINED ESOPHAGOSCOPY, GASTROSCOPY, DUODENOSCOPY (EGD);  Surgeon: Gerard Samaniego MD;  [facility]     ESOPHAGOSCOPY, GASTROSCOPY, DUODENOSCOPY (EGD), COMBINED N/A 05/12/2016    Procedure: COMBINED ESOPHAGOSCOPY, GASTROSCOPY, DUODENOSCOPY (EGD);  Surgeon: Ana Paula Urbina MD;  [facility]     ESOPHAGOSCOPY, GASTROSCOPY, DUODENOSCOPY (EGD), COMBINED N/A 08/02/2018    Procedure: COMBINED ESOPHAGOSCOPY, GASTROSCOPY, DUODENOSCOPY (EGD);  EGD;  Surgeon: Yu Wagner MD;  [facility]     HCL SQUAMOUS CELL CARCINOMA AG  10/2007    left forearm     HERNIORRHAPHY UMBILICAL  11/08/2012    Procedure: HERNIORRHAPHY UMBILICAL;  Open Umbilical Hernia Repair With Mesh ;  Surgeon: Chase Nicholson MD;  [facility]     INSERT STIMULATOR DORSAL COLUMN N/A 06/28/2018    Procedure: INSERT STIMULATOR DORSAL COLUMN;;  Surgeon: Elvis Sauceda MD;  [facility]     IR FLUORO 0-1 HOUR  1/23/2025     IR LOWER EXTREMITY ANGIOGRAM RIGHT  04/23/2024     neuro stimulator  2010     PICC DOUBLE LUMEN PLACEMENT Left 01/16/2025    Lateral brachial, 47 cm, 3 cm external length     RELEASE CARPAL TUNNEL Left 08/31/2023    Procedure: LEFT OPEN CARPAL TUNNEL RELEASE,;  Surgeon: Deny Dixon MD;  [facility]     RELEASE TRIGGER FINGER Left 08/31/2023    Procedure: Release left trigger finger thumb;  Surgeon: Deny Dixon MD;  Location: [facility] OR     REMOVE EXTRACORPORAL MEMBRANE OXYGENATOR ADULT N/A 01/03/2025    Procedure: Remove Left Leg Peripheral Extracorporal Membrane Oxygenator and Closure;  Surgeon: Bernice Rosenbaum,  MD;  [facility]     REMOVE GENERATOR STIMULATOR (LOCATION) N/A 2018    Procedure: REMOVE GENERATOR STIMULATOR (LOCATION);  Spinal Cord Stimulator and IPG Explant and Re-Implant of SCS System (Leads and IPG);  Surgeon: Elvis Sauceda MD;  [facility]     REPAIR TENDON ACHILLES Right 2020    Procedure: Right achilles debridement and partial calcaneus excision;  Surgeon: Eh Sandoval MD;  Location: [facility] OR     SURGICAL HISTORY OF -   2002    abcess tooth     SURGICAL HISTORY OF -       L4-5 laminectomy, cauda equina syndrome     SURGICAL HISTORY OF -   +    herniated disk repair     TONSILLECTOMY  10/1964     TRANSPOSITION ULNAR NERVE (ELBOW)      right     ZZC APPENDECTOMY       Family History   Problem Relation Age of Onset     Cancer Mother         lung     Breast Cancer Mother      Other Cancer Mother      Cancer Father         esophogeal, GERD     Snoring Father      Diabetes Brother         amputation, Type 1     Diabetes Brother      Diabetes Brother      Cancer - colorectal No family hx of      Glaucoma No family hx of      Macular Degeneration No family hx of      Anesthesia Reaction No family hx of      Venous thrombosis No family hx of         Social History     Socioeconomic History     Marital status: Single     Spouse name: Not on file     Number of children: 0     Years of education: Not on file     Highest education level: Not on file   Occupational History     Occupation: sales     Employer: UNEMPLOYED   Tobacco Use     Smoking status: Former     Current packs/day: 0.00     Types: Cigarettes     Start date: 10/13/1983     Quit date: 1984     Years since quittin.8     Passive exposure: Past     Smokeless tobacco: Never   Vaping Use     Vaping status: Every Day     Substances: THC, CBD     Devices: Refillable tank   Substance and Sexual Activity     Alcohol use: Not Currently     Comment: - last drink was 3/28/14     Drug use: Yes      Frequency: 2.0 times per week     Types: Marijuana     Comment: medical marijuana - vape daily     Sexual activity: Not Currently     Partners: Female     Birth control/protection: Abstinence   Other Topics Concern     Parent/sibling w/ CABG, MI or angioplasty before 65F 55M? No   Social History Narrative     Not on file     Social Drivers of Health     Financial Resource Strain: Unknown (1/2/2025)    Financial Resource Strain      Within the past 12 months, have you or your family members you live with been unable to get utilities (heat, electricity) when it was really needed?: Patient unable to answer   Food Insecurity: Unknown (1/2/2025)    Food Insecurity      Within the past 12 months, did you worry that your food would run out before you got money to buy more?: Patient unable to answer      Within the past 12 months, did the food you bought just not last and you didn t have money to get more?: Patient unable to answer   Transportation Needs: Unknown (1/2/2025)    Transportation Needs      Within the past 12 months, has lack of transportation kept you from medical appointments, getting your medicines, non-medical meetings or appointments, work, or from getting things that you need?: Patient unable to answer   Physical Activity: Inactive (12/31/2020)    Exercise Vital Sign      Days of Exercise per Week: 0 days      Minutes of Exercise per Session: 0 min   Stress: Not on file   Social Connections: Unknown (12/31/2020)    Social Connection and Isolation Panel [NHANES]      Frequency of Communication with Friends and Family: Not on file      Frequency of Social Gatherings with Friends and Family: Not on file      Attends Druze Services: Not on file      Active Member of Clubs or Organizations: Not on file      Attends Club or Organization Meetings: Not on file      Marital Status:    Interpersonal Safety: Low Risk  (2/17/2025)    Interpersonal Safety      Do you feel physically and emotionally safe  where you currently live?: Patient declined      Within the past 12 months, have you been hit, slapped, kicked or otherwise physically hurt by someone?: No      Within the past 12 months, have you been humiliated or emotionally abused in other ways by your partner or ex-partner?: No   Housing Stability: Unknown (1/2/2025)    Housing Stability      Do you have housing? : Patient unable to answer      Are you worried about losing your housing?: Patient unable to answer           Medications  Allergies   Current Outpatient Medications   Medication Sig Dispense Refill     acetaminophen (TYLENOL) 325 MG tablet Take 2 tablets (650 mg) by mouth every 8 hours as needed for mild pain.       albuterol (PROAIR HFA/PROVENTIL HFA/VENTOLIN HFA) 108 (90 Base) MCG/ACT inhaler INHALE 2 PUFFS BY MOUTH EVERY 6 HOURS AS NEEDED FOR SHORTNESS OF BREATH OR WHEEZING 18 g 11     aspirin (ASA) 81 MG chewable tablet Take 1 tablet (81 mg) by mouth daily. 30 tablet 0     baclofen (LIORESAL) 10 MG tablet Take 1 tablet (10 mg) by mouth 3 times daily as needed for muscle spasms. 20 tablet 0     blood glucose (NO BRAND SPECIFIED) test strip Use to test blood sugar 3 times daily or as directed. To accompany: Blood Glucose Monitor Brands: per insurance. 100 strip 2     blood glucose monitoring (NO BRAND SPECIFIED) meter device kit Use to test blood sugar 3 times daily or as directed. Preferred blood glucose meter OR supplies to accompany: Blood Glucose Monitor Brands: per insurance. 1 kit 0     calcium carbonate (TUMS) 500 MG chewable tablet Take 1 tablet (500 mg) by mouth 3 times daily as needed for heartburn. 30 tablet 0     diclofenac (VOLTAREN) 1 % topical gel Apply 4 g topically 4 times daily. 480 g 0     gabapentin (NEURONTIN) 300 MG capsule Take 2 capsules (600 mg) by mouth 3 times daily. 540 capsule 3     glucose 40 % (400 mg/mL) gel Take 15-30 g by mouth every 15 minutes as needed for low blood sugar. (Patient taking differently: Take by  mouth every 15 minutes as needed for low blood sugar.) 37.5 mL 0     insulin glargine (LANTUS PEN) 100 UNIT/ML pen Inject 7 Units subcutaneously at bedtime. HOLD if Blood Glucose<120 3 mL 3     losartan (COZAAR) 25 MG tablet Take 1 tablet (25 mg) by mouth daily. HOLD if SBP<100 90 tablet 1     medical cannabis (Patient's own supply) See Admin Instructions (The purpose of this order is to document that the patient reports taking medical cannabis.  This is not a prescription, and is not used to certify that the patient has a qualifying medical condition.)   Flower - PRN       methocarbamol (ROBAXIN) 500 MG tablet Take 1 tablet (500 mg) by mouth 3 times daily as needed for muscle spasms. 30 tablet 0     metoprolol succinate ER (TOPROL XL) 50 MG 24 hr tablet Take 1 tablet (50 mg) by mouth daily. 90 tablet 3     multivitamin, therapeutic (THERA-VIT) TABS tablet Take 1 tablet by mouth daily. 30 tablet 0     ondansetron (ZOFRAN) 4 MG tablet Take 1 tablet (4 mg) by mouth 2 times daily as needed for nausea. 60 tablet 0     oxyCODONE IR (ROXICODONE) 10 MG tablet Take 1 tablet (10 mg) by mouth 3 times daily as needed for severe pain. #90 for one month supply 90 tablet 0     pantoprazole (PROTONIX) 40 MG EC tablet Take 1 tablet (40 mg) by mouth daily. 90 tablet 3     ramelteon (ROZEREM) 8 MG tablet Take 1 tablet (8 mg) by mouth at bedtime. 30 tablet 0     rosuvastatin (CRESTOR) 40 MG tablet Take 1 tablet (40 mg) by mouth daily. 90 tablet 0     senna-docusate (SENOKOT-S/PERICOLACE) 8.6-50 MG tablet Take 1 tablet by mouth or Feeding Tube 2 times daily as needed for constipation. 30 tablet 0     sertraline (ZOLOFT) 100 MG tablet TAKE 2 TABLETS BY MOUTH DAILY 60 tablet 0     tamsulosin (FLOMAX) 0.4 MG capsule Take 1 capsule (0.4 mg) by mouth daily. 30 capsule 0     thiamine (B-1) 100 MG tablet Take 1 tablet (100 mg) by mouth daily. 30 tablet 0     thin (NO BRAND SPECIFIED) lancets Use with lanceting device. To accompany: Blood  "Glucose Monitor Brands: per insurance. 100 each 2     triamterene-HCTZ (DYAZIDE) 37.5-25 MG capsule Take 1 capsule by mouth every morning. HOLD if SBP<100 90 capsule 0       Allergies   Allergen Reactions     Levaquin Anaphylaxis and Rash     Swelling in lip and tongue felt thick     Lisinopril Anaphylaxis     Swollen tongue; inability to swallow; drooling; hives; swollen face, neck, angioedema     Acetaminophen      Hx of cirrhosis      Amlodipine      Swelling, hives, possible angioedema       Morphine      b/p dropped and arms went numb  Hypotension     Quinolones Hives     Spironolactone Unknown     Pt believes himself to be allergic to it; cannot find his old records of this.-mjc      Bactrim [Sulfamethoxazole-Trimethoprim] Rash          Lab Results    Chemistry/lipid CBC Cardiac Enzymes/BNP/TSH/INR   Recent Labs   Lab Test 04/23/24  1155   CHOL 143   HDL 60   LDL 70   TRIG 65     Recent Labs   Lab Test 04/23/24  1155 01/09/24  0932 10/21/22  1006   LDL 70 113* 105*     Recent Labs   Lab Test 03/26/25  1747 03/26/25  1629   NA  --  141   POTASSIUM  --  3.2*   CHLORIDE  --  104   CO2  --  21*   * 142*   BUN  --  12.4   CR  --  0.52*   GFRESTIMATED  --  >90   [place]  --  10.0     Recent Labs   Lab Test 03/26/25  1629 02/04/25  1052 01/28/25  0315   CR 0.52* 0.57* 0.58*     Recent Labs   Lab Test 12/31/24  1327 05/03/24  1105 01/09/24  0932   A1C 5.7* 5.9* 5.9*          Recent Labs   Lab Test 03/26/25  1629   WBC 9.3   HGB 14.5   HCT 42.9   MCV 82        Recent Labs   Lab Test 03/26/25  1629 02/04/25  1052 01/28/25  0315   HGB 14.5 11.2* 11.8*    No results for input(s): \"TROPONINI\" in the last 06307 hours.  No results for input(s): \"BNP\", \"NTBNPI\", \"NTBNP\" in the last 42559 hours.  Recent Labs   Lab Test 01/02/25  0333   TSH 0.48     Recent Labs   Lab Test 01/28/25  0315 01/22/25  0413 01/03/25  0336   INR 1.16* 1.37* 1.29*                                                                          "       Thank you for allowing me to participate in the care of your patient.      Sincerely,     Markus Fox MD     [facility]  cc:   Dawit Fox MD  [address] [address]

## 2025-04-22 ENCOUNTER — TELEPHONE (OUTPATIENT)
Dept: GASTROENTEROLOGY | Facility: CLINIC | Age: 65
End: 2025-04-22
Payer: MEDICARE

## 2025-04-22 ENCOUNTER — MYC MEDICAL ADVICE (OUTPATIENT)
Dept: PHYSICAL MEDICINE AND REHAB | Facility: CLINIC | Age: 65
End: 2025-04-22
Payer: MEDICARE

## 2025-04-22 ENCOUNTER — NURSE TRIAGE (OUTPATIENT)
Dept: FAMILY MEDICINE | Facility: CLINIC | Age: 65
End: 2025-04-22
Payer: MEDICARE

## 2025-04-22 DIAGNOSIS — Z89.511 S/P BELOW KNEE AMPUTATION, RIGHT (H): Primary | ICD-10-CM

## 2025-04-22 NOTE — TELEPHONE ENCOUNTER
[facility] Call Center    Phone Message    May a detailed message be left on voicemail: Yes    Reason for Call: Other: Patient is currently scheduled on 5/15/25, as visit type New GI Emergent . This is outside the expected timeline for this referral. Patient has been added to the waitlist.      Action Taken: Message routed to:  Other: GI REFERRAL TRIAGE POOL     Travel Screening: Not Applicable

## 2025-04-22 NOTE — TELEPHONE ENCOUNTER
Left message for patient to call [facility] back  When patient calls back please transfer to an RN  Thanks,  Romelia KLEIN RN

## 2025-04-22 NOTE — TELEPHONE ENCOUNTER
#Right TTA neuropathic pain  Only triggered by prosthesis use and prevents ambulation.  Topicals including voltaren / capsaicin and increased oral gabapentin were ineffective.  Neuroma is a possibility and must be ruled out.  - ordered a right leg MRI with contrast  - ordered a single dose of ativan for claustrophobia with MRI  - will follow pain management recs on 4/29/25    #Financial strain from medical visits  - instructed patient to contact PCP for social work referral for transportation assistance

## 2025-04-22 NOTE — TELEPHONE ENCOUNTER
"RENATA,  Spoke to patient regarding mychart message sent to cardiology for weight concern  Cardiologist forward this concern to PCP clinic    Patient reports current weight is 147lb with prosthetic on  Reports he is normally 185lbs  Reports weight increased to 165lbs at TCU - discharged 2/7/25  PCP VV completed 2/4/25 - weight reported 166lbs    Patient states \"Since leaving rehab, I've had no nursing, physical therapy or cardiotherapy   No one ever scheduled it for me   The nursing people never showed up  It's impossible for me to gain weight with eating  Eating spinach salads, drinking two protein shakes, sandwiches   I've sent messages about this that haven't been addressed  I drink 5 cans of sparkling water daily, with juice, protein drinks\"     Patient states he has not yet discussed concern with PCP  Patient speaking in long full sentences on the phone and responding appropriately   First available [facility] appointment is tomorrow 4/23, patient states he feels safe to wait until tomorrow to follow up with a provider  Patient states he is only able to schedule virtual visit follow up until after 5/1/25 due to financial concerns  Okay to add on VV with PCP tomorrow?   Or could schedule virtual visit with covering provider     Romelia Leung RN    Reason for Disposition   SEVERE weight loss (e.g., BMI < 15; weight loss that is rapid; taking little or no food by mouth)    Additional Information   Negative: Shock suspected (e.g., cold/pale/clammy skin, too weak to stand, low BP, rapid pulse)   Negative: Sounds like a life-threatening emergency to the triager   Negative: Nausea is main symptom   Negative: Vomiting is main symptom   Negative: Morning sickness (nausea and vomiting of pregnancy) is main concern   Negative: [1] Nausea and/or vomiting AND [2] receiving active treatment (e.g., chemotherapy, radiation) or palliative care for cancer   Negative: Diarrhea is main symptom   Negative: Swallowing " difficulty is main symptom   Negative: Known or suspected eating disorder (e.g., anorexia nervosa, binge eating, bulimia)   Negative: Diabetes and high blood sugar (glucose) is main concern   Negative: Recent exposure to tuberculosis (TB)   Negative: Elder or vulnerable adult abuse is main concern   Negative: [1] Drinking very little AND [2] dehydration suspected (e.g., no urine > 12 hours, very dry mouth, very lightheaded)   Negative: Symptoms of high blood sugar (e.g., abnormally thirsty, frequent urination, weight loss)   Negative: Patient sounds very sick or weak to the triager    Protocols used: Weight Loss - [id number]

## 2025-04-23 RX ORDER — LORAZEPAM 0.5 MG/1
0.5 TABLET ORAL ONCE
Qty: 1 TABLET | Refills: 0 | Status: SHIPPED | OUTPATIENT
Start: 2025-04-23 | End: 2025-04-24

## 2025-04-23 ASSESSMENT — ANXIETY QUESTIONNAIRES
6. BECOMING EASILY ANNOYED OR IRRITABLE: NOT AT ALL
4. TROUBLE RELAXING: SEVERAL DAYS
GAD7 TOTAL SCORE: 1
5. BEING SO RESTLESS THAT IT IS HARD TO SIT STILL: NOT AT ALL
3. WORRYING TOO MUCH ABOUT DIFFERENT THINGS: NOT AT ALL
8. IF YOU CHECKED OFF ANY PROBLEMS, HOW DIFFICULT HAVE THESE MADE IT FOR YOU TO DO YOUR WORK, TAKE CARE OF THINGS AT HOME, OR GET ALONG WITH OTHER PEOPLE?: SOMEWHAT DIFFICULT
2. NOT BEING ABLE TO STOP OR CONTROL WORRYING: NOT AT ALL
IF YOU CHECKED OFF ANY PROBLEMS ON THIS QUESTIONNAIRE, HOW DIFFICULT HAVE THESE PROBLEMS MADE IT FOR YOU TO DO YOUR WORK, TAKE CARE OF THINGS AT HOME, OR GET ALONG WITH OTHER PEOPLE: SOMEWHAT DIFFICULT
7. FEELING AFRAID AS IF SOMETHING AWFUL MIGHT HAPPEN: NOT AT ALL
7. FEELING AFRAID AS IF SOMETHING AWFUL MIGHT HAPPEN: NOT AT ALL
GAD7 TOTAL SCORE: 1
GAD7 TOTAL SCORE: 1
1. FEELING NERVOUS, ANXIOUS, OR ON EDGE: NOT AT ALL

## 2025-04-23 ASSESSMENT — PATIENT HEALTH QUESTIONNAIRE - PHQ9
SUM OF ALL RESPONSES TO PHQ QUESTIONS 1-9: 9
SUM OF ALL RESPONSES TO PHQ QUESTIONS 1-9: 9
10. IF YOU CHECKED OFF ANY PROBLEMS, HOW DIFFICULT HAVE THESE PROBLEMS MADE IT FOR YOU TO DO YOUR WORK, TAKE CARE OF THINGS AT HOME, OR GET ALONG WITH OTHER PEOPLE: SOMEWHAT DIFFICULT

## 2025-04-23 ASSESSMENT — ASTHMA QUESTIONNAIRES
QUESTION_5 LAST FOUR WEEKS HOW WOULD YOU RATE YOUR ASTHMA CONTROL: WELL CONTROLLED
ACT_TOTALSCORE: 21
QUESTION_3 LAST FOUR WEEKS HOW OFTEN DID YOUR ASTHMA SYMPTOMS (WHEEZING, COUGHING, SHORTNESS OF BREATH, CHEST TIGHTNESS OR PAIN) WAKE YOU UP AT NIGHT OR EARLIER THAN USUAL IN THE MORNING: NOT AT ALL
QUESTION_1 LAST FOUR WEEKS HOW MUCH OF THE TIME DID YOUR ASTHMA KEEP YOU FROM GETTING AS MUCH DONE AT WORK, SCHOOL OR AT HOME: NONE OF THE TIME
QUESTION_4 LAST FOUR WEEKS HOW OFTEN HAVE YOU USED YOUR RESCUE INHALER OR NEBULIZER MEDICATION (SUCH AS ALBUTEROL): TWO OR THREE TIMES PER WEEK
QUESTION_2 LAST FOUR WEEKS HOW OFTEN HAVE YOU HAD SHORTNESS OF BREATH: ONCE OR TWICE A WEEK

## 2025-04-23 NOTE — TELEPHONE ENCOUNTER
Writer called to  help get Pt scheduled for a sooner GI appointment. Pt accepted a virtual visit with Brenda Mederos on 4/28/2025 at 2:30 PM.

## 2025-04-24 ENCOUNTER — VIRTUAL VISIT (OUTPATIENT)
Dept: FAMILY MEDICINE | Facility: CLINIC | Age: 65
End: 2025-04-24
Payer: MEDICARE

## 2025-04-24 ENCOUNTER — MYC MEDICAL ADVICE (OUTPATIENT)
Dept: FAMILY MEDICINE | Facility: CLINIC | Age: 65
End: 2025-04-24

## 2025-04-24 DIAGNOSIS — M76.60 CALCIFIC ACHILLES TENDINITIS: ICD-10-CM

## 2025-04-24 DIAGNOSIS — F11.20 CONTINUOUS OPIOID DEPENDENCE (H): ICD-10-CM

## 2025-04-24 DIAGNOSIS — Z98.890 H/O FOOT SURGERY: ICD-10-CM

## 2025-04-24 DIAGNOSIS — M21.6X1 ACQUIRED CALCANEUS DEFORMITY OF RIGHT ANKLE: ICD-10-CM

## 2025-04-24 DIAGNOSIS — S86.011A RUPTURE OF RIGHT ACHILLES TENDON, INITIAL ENCOUNTER: ICD-10-CM

## 2025-04-24 DIAGNOSIS — E78.5 HYPERLIPIDEMIA LDL GOAL <100: ICD-10-CM

## 2025-04-24 DIAGNOSIS — Z12.5 SCREENING FOR PROSTATE CANCER: ICD-10-CM

## 2025-04-24 DIAGNOSIS — G89.29 CHRONIC PAIN OF RIGHT ANKLE: ICD-10-CM

## 2025-04-24 DIAGNOSIS — M79.671 INTRACTABLE RIGHT HEEL PAIN: ICD-10-CM

## 2025-04-24 DIAGNOSIS — E11.42 TYPE 2 DIABETES MELLITUS WITH DIABETIC POLYNEUROPATHY, WITH LONG-TERM CURRENT USE OF INSULIN (H): ICD-10-CM

## 2025-04-24 DIAGNOSIS — M25.571 CHRONIC PAIN OF RIGHT ANKLE: ICD-10-CM

## 2025-04-24 DIAGNOSIS — Z79.4 TYPE 2 DIABETES MELLITUS WITH DIABETIC POLYNEUROPATHY, WITH LONG-TERM CURRENT USE OF INSULIN (H): ICD-10-CM

## 2025-04-24 DIAGNOSIS — F11.90 CHRONIC, CONTINUOUS USE OF OPIOIDS: ICD-10-CM

## 2025-04-24 DIAGNOSIS — G89.4 CHRONIC PAIN SYNDROME: ICD-10-CM

## 2025-04-24 DIAGNOSIS — R63.4 UNINTENTIONAL WEIGHT LOSS: Primary | ICD-10-CM

## 2025-04-24 RX ORDER — OXYCODONE HYDROCHLORIDE 10 MG/1
10 TABLET ORAL
Qty: 150 TABLET | Refills: 0 | Status: SHIPPED | OUTPATIENT
Start: 2025-04-24

## 2025-04-24 NOTE — LETTER

## 2025-04-24 NOTE — TELEPHONE ENCOUNTER
Spoke with patient he is fine and it was Homecare nurse for wife that interrupted. Will read message.    Isela CROCKER

## 2025-04-24 NOTE — TELEPHONE ENCOUNTER
Please call Erwin and apologize we got disconnected at the end of our visit and I wasn't able to re-connect.     Let him know I sent a [employer] message to recap/summarize the plan.      Let me know if he feels we need to connect and talk again soon.     Thank you, Joel Wegener,MD

## 2025-04-24 NOTE — PATIENT INSTRUCTIONS
Unintentional weight loss:     Despite high calories, attempting to eat high calorie. Several Protein shakes, salads, ice cream , pasta, bread, etc,  Having difficulty gaining muscle weight after being intubated. Feels lack of home care/home pt contributed. Seeing a gastroenterologist on Monday. Also has had nausea for 20+ years.  Also had c-diff and norovirus when in rehab. Spouse very ill with liver disease.  Very difficult mentally taking care of her as well.     Normal weight  217 lbs one year ago lost some weight with biking, 185, 170 when left rehab and 140lbs now    Plan:    - seeing GI for evaluation of weight loss/chronic nausea/possible gastroparesis even though diabetes well controlled.   - recommend labs for evaluation of underlying causes as ordered in this visit.   - overdue for colon cancer screening recommend discussing this with GI consultant.       Recent ED evaluation for chest pain - heart attack ruled out.      Second issue now is difficulty fitting prosthetic on leg:     H/o leg amputation - now finally has prosthetic and wheelchair. High level of pain with prosthetic unfortunately especially with prolonged use and has had difficulty getting the prosthetic to fit well. Alternating between this and wheelchair.  Also has phantom limb pain. Feels likely has nerve cluster on bottom of the stump and prosthetic sitting too far forward causing posterior leg/hamstring pain. Having leg/back MRI next week.     Only items which seems to help pain are gabapentin and oxycodone.     Using oxycodone 10mg three times daily  = 30mg morphine equivalent/day.  Feels most days would use 4/day but sometimes 5.  Ok to change prescription to five 10mg/day = 75mg morphine equivalent /day.     Seeing dr. Grimm pain management later this month.  TBS device not working well and going to be troubleshooting this with dr. Grimm at this appointment. Also interested in suboxone since doesn't really want to be on  oxycodone for prolonged period.   If decides to transition to suboxone I could prescribe but would need dosing/transition guidance from Dr. Grimm. Due to review/sign controlled substance agreement so will mail that out.

## 2025-04-24 NOTE — PROGRESS NOTES
Erwin is a 64 year old who is being evaluated via a billable video visit.    How would you like to obtain your AVS? Rdhart  If the video visit is dropped, the invitation should be resent by: Text to cell phone: [phone]  Will anyone else be joining your video visit? No      Assessment & Plan       Unintentional weight loss:     Despite high calories, attempting to eat high calorie. Several Protein shakes, salads, ice cream , pasta, bread, etc,  Having difficulty gaining muscle weight after being intubated. Feels lack of home care/home pt contributed. Seeing a gastroenterologist on Monday. Also has had nausea for 20+ years.  Also had c-diff and norovirus when in rehab. Spouse very ill with liver disease.  Very difficult mentally taking care of her as well.     Normal weight  217 lbs one year ago lost some weight with biking, 185, 170 when left rehab and 140lbs now    Plan:    - seeing GI for evaluation of weight loss/chronic nausea/possible gastroparesis even though diabetes well controlled.   - recommend labs for evaluation of underlying causes as ordered in this visit.   - overdue for colon cancer screening recommend discussing this with GI consultant.       Recent ED evaluation for chest pain - heart attack ruled out.      Second issue now is difficulty fitting prosthetic on leg:     H/o leg amputation/chronic pain - now finally has prosthetic and wheelchair. High level of pain with prosthetic unfortunately especially with prolonged use and has had difficulty getting the prosthetic to fit well. Alternating between this and wheelchair.  Also has phantom limb pain. Feels likely has nerve cluster on bottom of the stump and prosthetic sitting too far forward causing posterior leg/hamstring pain. Having leg/back MRI next week.     Only items which seems to help pain are gabapentin and oxycodone.     Using oxycodone 10mg three times daily  = 30mg morphine equivalent/day.  Feels most days would use 4/day but  sometimes 5.  Ok to change prescription to five 10mg/day = 75mg morphine equivalent /day.     Seeing dr. Grimm pain management later this month.  Medtronic device not working well and going to be troubleshooting this with dr. Grimm at this appointment. Also interested in suboxone since doesn't really want to be on oxycodone for prolonged period.   If decides to transition to suboxone I could prescribe but would need dosing/transition guidance from Dr. Grimm. Due to review/sign controlled substance agreement so will mail that out.     No suspicious activity on [place] rx monitoring database .  Utox up to date and appropriate.     Unintentional weight loss    - Comprehensive metabolic panel; Future  - Albumin Random Urine Quantitative with Creat Ratio; Future  - CBC with platelets; Future  - TSH with free T4 reflex; Future  - Prostate Specific Antigen Screen; Future  - oxyCODONE IR (ROXICODONE) 10 MG tablet; Take 1 tablet (10 mg) by mouth 5 x daily PRN for severe pain. #150 for one month supply    Chronic pain syndrome    - oxyCODONE IR (ROXICODONE) 10 MG tablet; Take 1 tablet (10 mg) by mouth 5 x daily PRN for severe pain. #150 for one month supply    H/O foot surgery    - oxyCODONE IR (ROXICODONE) 10 MG tablet; Take 1 tablet (10 mg) by mouth 5 x daily PRN for severe pain. #150 for one month supply    Intractable right heel pain    - oxyCODONE IR (ROXICODONE) 10 MG tablet; Take 1 tablet (10 mg) by mouth 5 x daily PRN for severe pain. #150 for one month supply    Rupture of right Achilles tendon, initial encounter    - oxyCODONE IR (ROXICODONE) 10 MG tablet; Take 1 tablet (10 mg) by mouth 5 x daily PRN for severe pain. #150 for one month supply    Acquired calcaneus deformity of right ankle    - oxyCODONE IR (ROXICODONE) 10 MG tablet; Take 1 tablet (10 mg) by mouth 5 x daily PRN for severe pain. #150 for one month supply    Chronic pain of right ankle    - oxyCODONE IR (ROXICODONE) 10 MG tablet; Take 1 tablet (10 mg) by  mouth 5 x daily PRN for severe pain. #150 for one month supply    Calcific Achilles tendinitis    - oxyCODONE IR (ROXICODONE) 10 MG tablet; Take 1 tablet (10 mg) by mouth 5 x daily PRN for severe pain. #150 for one month supply    Chronic, continuous use of opioids    - oxyCODONE IR (ROXICODONE) 10 MG tablet; Take 1 tablet (10 mg) by mouth 5 x daily PRN for severe pain. #150 for one month supply    Continuous opioid dependence (H)    - oxyCODONE IR (ROXICODONE) 10 MG tablet; Take 1 tablet (10 mg) by mouth 5 x daily PRN for severe pain. #150 for one month supply    Type 2 diabetes mellitus with diabetic polyneuropathy, with long-term current use of insulin (H)    - Hemoglobin A1c; Future    Hyperlipidemia LDL goal <100    - Lipid panel reflex to direct LDL Fasting; Future    Screening for prostate cancer    - Prostate Specific Antigen Screen; Future    The longitudinal plan of care for the diagnosis(es)/condition(s) as documented were addressed during this visit. Due to the added complexity in care, I will continue to support Erwin in the subsequent management and with ongoing continuity of care.              Subjective   Erwin is a 64 year old, presenting for the following health issues:  Weight Loss and Amputation (Difficulty with prosthetic )        4/24/2025     8:31 AM   Additional Questions   Roomed by RODERICK Malone   Accompanied by N/A       Video Start Time:  9:38    History of Present Illness       Reason for visit:  Extreme weight losd  Symptom onset:  More than a month  Symptoms include:  I lost over 35 lbs  Symptom intensity:  Severe  Symptom progression:  Worsening  Had these symptoms before:  No  What makes it worse:  No  What makes it better:  Medical Cannabis   He is taking medications regularly.      Pt reports he has not started rehab since surgery. Pt reports loss of muscle mass. Pt reports extreme fatigue. Pt reports appointment scheduled with gastroenterologist for nausea.   Pt reports discomfort and  difficulty with prosthetic fit. Pt reports pain at base of amputation with use of prosthetic. Pt reports using oxycodone for pain relief.                 Objective           Vitals:  No vitals were obtained today due to virtual visit.    Physical Exam   GENERAL: alert and no distress  EYES: Eyes grossly normal to inspection.  No discharge or erythema, or obvious scleral/conjunctival abnormalities.  RESP: No audible wheeze, cough, or visible cyanosis.    SKIN: Visible skin clear. No significant rash, abnormal pigmentation or lesions.  NEURO: Cranial nerves grossly intact.  Mentation and speech appropriate for age.  PSYCH: Appropriate affect, tone, and pace of words          Video-Visit Details    Type of service:  Video Visit   Video End Time:10:23 AM  Originating Location (pt. Location): Home    Distant Location (provider location):  On-site  Platform used for Video Visit: Kameron  Signed Electronically by: Joel Daniel Wegener, MD

## 2025-04-25 NOTE — TELEPHONE ENCOUNTER
REFERRAL INFORMATION:  Referring Provider:  Dawit Fox MD   Referring Clinic:  [facility]   Reason for Visit/Diagnosis:   K31.84 (ICD-10-CM) - Gastroparesis   R11.0 (ICD-10-CM) - Nausea   R11.10 (ICD-10-CM) - Vomiting        FUTURE VISIT INFORMATION:  Appointment Date: 5/15/25     NOTES STATUS DETAILS   OFFICE NOTE from Referring Provider Internal 4/21/25   HOSPITAL DISCHARGE SUMMARY/  ED VISITS Care Everywhere [facility]: ED 4/20/25-[facility]    [facility]:  ED 3/26/25-[facility]  ED 1/28/25-[facility]   OPERATIVE REPORT Internal 11/8/12-Open Umbilical Hernia Repair With Mesh    MEDICATION LIST Internal    PROCEDURES     ENDOSCOPY  Internal 8/2/18, 10/31/14, 7/24/14, 6/9/14, 3/28/14   COLONOSCOPY Internal 5/12/16   STOOL TESTING     C. DIFF Internal 1/28/25   ENTERIC BACTERIA Internal 1/28/25   LABS     PERTINENT LABS Internal    PATHOLOGY REPORTS (RELATED) Internal Colonoscopy 5/12/16  EGD 3/28/14   IMAGES     CT In process-Received [facility]: 4/20/25-CTA chest abdomen    [facility]:  1/17/25-CT chest abdomen  1/12/25-CT chest   ULTRASOUND Internal 1/28/25-US abdomen   XRAY In process-Received [facility]:  4/20/25-XR chest    [facility]:  3/26/25-XR chest-more in PACS  1/11/25-XR abdomen     Records Requested   April 23, 2025 8:08 AM  [email or web address]   Facility  [facility]   Outcome Requested images      SLP attempted to see patient earlier, however family requesting pt to be cleaned up first. SLP to follow up as time permits.

## 2025-04-27 ENCOUNTER — MYC MEDICAL ADVICE (OUTPATIENT)
Dept: FAMILY MEDICINE | Facility: CLINIC | Age: 65
End: 2025-04-27
Payer: MEDICARE

## 2025-04-27 ASSESSMENT — PAIN SCALES - PAIN ENJOYMENT GENERAL ACTIVITY SCALE (PEG)
AVG_PAIN_PASTWEEK: 8
INTERFERED_ENJOYMENT_LIFE: 7
PEG_TOTALSCORE: 8
INTERFERED_GENERAL_ACTIVITY: 9

## 2025-04-28 ENCOUNTER — VIRTUAL VISIT (OUTPATIENT)
Dept: GASTROENTEROLOGY | Facility: CLINIC | Age: 65
End: 2025-04-28
Attending: INTERNAL MEDICINE
Payer: MEDICARE

## 2025-04-28 VITALS — BODY MASS INDEX: 20.92 KG/M2 | WEIGHT: 150 LBS

## 2025-04-28 DIAGNOSIS — K31.84 GASTROPARESIS: ICD-10-CM

## 2025-04-28 DIAGNOSIS — R63.4 WEIGHT LOSS: Primary | ICD-10-CM

## 2025-04-28 DIAGNOSIS — R11.0 NAUSEA: ICD-10-CM

## 2025-04-28 DIAGNOSIS — R11.2 NAUSEA AND VOMITING, UNSPECIFIED VOMITING TYPE: ICD-10-CM

## 2025-04-28 PROBLEM — E66.01 MORBID OBESITY (H): Status: RESOLVED | Noted: 2025-01-28 | Resolved: 2025-04-28

## 2025-04-28 PROCEDURE — 1125F AMNT PAIN NOTED PAIN PRSNT: CPT | Mod: 95 | Performed by: STUDENT IN AN ORGANIZED HEALTH CARE EDUCATION/TRAINING PROGRAM

## 2025-04-28 PROCEDURE — 98007 SYNCH AUDIO-VIDEO EST HI 40: CPT | Performed by: STUDENT IN AN ORGANIZED HEALTH CARE EDUCATION/TRAINING PROGRAM

## 2025-04-28 ASSESSMENT — PAIN SCALES - GENERAL: PAINLEVEL_OUTOF10: SEVERE PAIN (8)

## 2025-04-28 NOTE — PATIENT INSTRUCTIONS
It was a pleasure meeting with you today and discussing your healthcare plan. Below is a summary of what we covered:    - Please monitor your weight and contact me if you are not regaining as expected    Please see below for any additional questions and scheduling guidelines.    Sign up for [employer]: [employer] patient portal serves as a secure platform for accessing your medical records from the [facility]. Additionally, [employer] facilitates easy, timely, and secure messaging with your care team. If you have not signed up, you may do so by using the provided code or calling [phone].    Coordinating your care after your visit:  There are multiple options for scheduling your follow-up care based on your provider's recommendation.    How do I schedule a follow-up clinic appointment:   After your appointment, you may receive scheduling assistance with the Clinic Coordinators by having a seat in the waiting room and a Clinic Coordinator will call you up to schedule.  Virtual visits or after you leave the clinic:  Your provider has placed a follow-up order in the [employer] portal for scheduling your return appointment. A member of the scheduling team will contact you to schedule.  [employer] Scheduling: Timely scheduling through [employer] is advised to ensure appointment availability.   Call to schedule: You may schedule your follow-up appointment(s) by calling [phone], option 1.    How do I schedule my endoscopy or colonoscopy procedure:  If a procedure, such as a colonoscopy or upper endoscopy was ordered by your provider, the scheduling team will contact you to schedule this procedure. Or you may choose to call to schedule at   [phone], option 2.  Please allow 20-30 minutes when scheduling a procedure.    How do I get my blood work done? To get your blood work done, you need to schedule a lab appointment at an [facility]. There are multiple ways to schedule:   At the clinic: The  Clinic Coordinator you meet after your visit can help you schedule a lab appointment.   [employer] scheduling: [employer] offers online lab scheduling at all [facility] laboratory locations.   Call to schedule: You can call [phone] to schedule your lab appointment.    How do I schedule my imaging study: To schedule imaging studies, such as CT scans, ultrasounds, MRIs, or X-rays, contact Imaging Services at [phone].    How do I schedule a referral to another doctor: If your provider recommended a referral to another specialist(s), the referral order was placed by your provider. You will receive a phone call to schedule this referral, or you may choose to call the number attached to the referral to self-schedule.    For Post-Visit Question(s):  For any inquiries following today's visit:  Please utilize [email or web address] messaging and allow 48 hours for reply or contact the Call Center during normal business hours at [phone], option 3.  For Emergent After-hours questions, contact the On-Call GI Fellow through the [facility]  at [phone].  In addition, you may contact your Nurse directly using the provided contact information.    Test Results:  Test results will be accessible via [email or web address] in compliance with the 21st Century Cures Act. This means that your results will be available to you at the same time as your provider. Often you may see your results before your provider does. Results are reviewed by staff within two weeks with communication follow-up. Results may be released in the patient portal prior to your care team review.    Prescription Refill(s):  Medication prescribed by your provider will be addressed during your visit. For future refills, please coordinate with your pharmacy. If you have not had a recent clinic visit or routine labs, for your safety, your provider may not be able to refill your prescription.

## 2025-04-28 NOTE — NURSING NOTE
Current patient location: [address] [place] [address] N   [place] [address]    Is the patient currently in the state of [place]? YES    Visit mode: VIDEO    If the visit is dropped, the patient can be reconnected by:VIDEO VISIT: Text to cell phone:   Telephone Information:   Mobile [phone]       Will anyone else be joining the visit? NO  (If patient encounters technical issues they should call [phone])    Are changes needed to the allergy or medication list? No    Are refills needed on medications prescribed by this physician? NO    Rooming Documentation:  Questionnaire(s) completed    Reason for visit: Consult    Bernie ORTIZ

## 2025-04-28 NOTE — PROGRESS NOTES
"Virtual Visit Details    Type of service:  Video Visit   Video Start Time: 2:29 PM  Video End Time: 2:57 PM    Originating Location (pt. Location): Home    Distant Location (provider location):  Off-site  Platform used for Video Visit: [facility]      GASTROENTEROLOGY NEW PATIENT VIDEO VISIT    CC/REFERRING MD:    Wegener, Joel Daniel Irwin Wojciech Zukowski    REASON FOR CONSULTATION:   Dawit Fox for   Chief Complaint   Patient presents with    Consult       HISTORY OF PRESENT ILLNESS:    Erwin Aguilar is a 64 year old male with a past medical history significant for type 2 diabetes, neuropathy, complex regional pain syndrome type 1, CAD, moderate persistent asthma, GERD, alcoholic cirrhosis of the liver, gastroparesis, neurogenic bowel and bladder, paroxysmal atrial fibrillation, hx of TIA and STEMI, alcoholism in remission, opioid dependence who is being evaluated via a billable video visit for gastroparesis, nausea and vomiting.     In the past, a NM GES was ordered but he had not completed it.     Today, he reports he is recovering from a lot of medical problems. States he had a heart attack on New Years Chelo. Reports hx of heavy drinking and had an esophageal bleed. Last year in March he was \"kicked out of the liver clinic\" because his liver function imprved signficantly after he stopped drinking alochol. He had did better from a nausea standpoint after that. He has chronic pain, s/p failed back surgery, and reported with the chronic pain he felt severaly nauseous. He would wake in the night, smoke marijuana for the nausea, and would be ok. He then was dealing with drop foot, a bone spur damaged his achilles tendon, s/p multiple tears of the achilles tendon. Throughout that time due to pain he had nausea. In 1/2024, he had a nonhealing wound around his achilles tendon. He was dx with peripheral artery disease which is why things didn't heal.  He had to increase narcotic doses to manage the pain. He then " had a partial leg amputation. He reports while in cardiac rehab after his heart attack, he had norovirus and C diff at the same time.     He had managed nausea with zofran. States for 2 weeks mid April he was vomiting on a daily basis, but it was more dry heaves, states wasn't eating anything around this time. Was drinking protein drinks instead. States vomiting/dry heaves subsided April 18th, about 10 days ago. He feels his prosthetic is giving him nausea, and will have an adjustment to his spinal cord stimulator to improve the pain coming up.     States he is most concerned about his weight loss. He reduced the fat continent of his diet, and has been less mobile since the leg surgery. States he has been cycling and has lost a lot of weight. Denies abdominal pain after eating.     States in the morning he has yogurt and granola with fruit. For lunch he has pasta salad, shrimp, spinach salad.     He has zofran, takes twice daily, doesn't feel it helps the nausea. States smoking marijuana is what works.     States A1c has been very well controlled, always within the 5-6% range for many years per patient.     Bowel movements are daily without laxatives, stools are soft but formed. No blood in the stool.     He takes pantoprazole but doesn't have any reflux or heartburn.     Reports eating a good portion size without early satiety     Family hx: dad passed of esophageal cancer at age 56, no family history of colon cancer   He plans to do cologuard   Hx colonoscopy 2016: stool in colon, recommended to repeat in 3 years.   EGD - 2018: hematin in the stomach, portal hypertensive gastropathy     CT c/a/p 8 days ago without acute pathology     Wt Readings from Last 10 Encounters:   04/28/25 68 kg (150 lb)   04/08/25 69.4 kg (153 lb)   02/26/25 72.6 kg (160 lb)   02/17/25 77.1 kg (170 lb)   02/05/25 77.3 kg (170 lb 6.4 oz)   02/03/25 77.1 kg (170 lb)   01/31/25 77.1 kg (170 lb)   01/29/25 77.3 kg (170 lb 6.4 oz)   01/28/25  78 kg (172 lb)   01/28/25 77.3 kg (170 lb 6.4 oz)       I have reviewed and updated the patient's Past Medical History, Social History, Family History and Medication List.    Exam:    Constitutional - general appearance is well and in no acute distress. Body habitus normal  Eyes - No redness or discharge  Respiratory - No cough, unlabored breathing  Musculoskeletal - range of motion intact: Neck and arms  Skin - No discoloration or lesions  Neurological - No tremors  Psychiatric - No anxiety, alert & oriented     PERTINENT STUDIES have been reviewed.    ASSESSMENT/PLAN:    Erwin Aguilar is a 64 year old male who presents for evaluation of weight loss, nausea, vomiting     1. Gastroparesis  2. Nausea  3. Vomiting  - Adult GI  Referral - Consult Only  4. Weight loss     Erwin Aguilar is a 64 year old male with past medical history significant for type 2 diabetes, neuropathy, complex regional pain syndrome type 1, CAD, moderate persistent asthma, GERD, alcoholic cirrhosis of the liver, gastroparesis, neurogenic bowel and bladder, paroxysmal atrial fibrillation, hx of TIA and STEMI, alcoholism in remission, opioid dependence who is being evaluated via a billable video visit for gastroparesis, nausea and vomiting. He reports he had a bout of dry heaving in April which has since resolved. He has chronic nausea, which he attributes to chronic pain and many of his medical problems, and isnt necessarily concerned about this. He feels he has this well managed with medical marijuana. He also uses zofran, though feels it is less effective. It is possible that he has cannabis hyperemesis syndrome.    He has never had a NM GES and has had well controlled diabetes for years per patient, so unclear if he actually has gastroparesis. We discussed obtaining a gastric emptying study but he would like to hold off for now.     He attributes his weight loss of 20 lbs over the last 3 months to be from his medical problems  including heart attack s/p CABG and below the knee amputation now using wheelchair. He feels he is regaining his strength.     He had a CT c/a/p last week without acute findings.     We discussed colonoscopy and EGD but he would not like to proceed due to risks of anesthesia at this time.     He would prefer to follow up as needed.    Brenda Mederos PA-C    RTC PRN per patient preference     Thank you for this consultation.  It was a pleasure to participate in the care of this patient; please contact us with any further questions. 52 minutes spent on the date of the encounter doing chart review, history and exam, documentation and further activities per the note.    Brenda Mederos PA-C  Division of Gastroenterology, Hepatology and Nutrition  [facility]

## 2025-04-28 NOTE — LETTER
"4/28/2025      Erwin Aguilar  [address]  [place] [address]      Dear Colleague,    Thank you for referring your patient, Erwin Aguilar, to the [facility]OLOG[facility] [place]. Please see a copy of my visit note below.    Virtual Visit Details    Type of service:  Video Visit   Video Start Time: 2:29 PM  Video End Time: 2:57 PM    Originating Location (pt. Location): Home    Distant Location (provider location):  Off-site  Platform used for Video Visit: [facility]      GASTROENTEROLOGY NEW PATIENT VIDEO VISIT    CC/REFERRING MD:    Wegener, Joel Daniel Irwin Wojciech Zukowski    REASON FOR CONSULTATION:   Dawit Fox for   Chief Complaint   Patient presents with     Consult       HISTORY OF PRESENT ILLNESS:    Erwin Aguilar is a 64 year old male with a past medical history significant for type 2 diabetes, neuropathy, complex regional pain syndrome type 1, CAD, moderate persistent asthma, GERD, alcoholic cirrhosis of the liver, gastroparesis, neurogenic bowel and bladder, paroxysmal atrial fibrillation, hx of TIA and STEMI, alcoholism in remission, opioid dependence who is being evaluated via a billable video visit for gastroparesis, nausea and vomiting.     In the past, a NM GES was ordered but he had not completed it.     Today, he reports he is recovering from a lot of medical problems. States he had a heart attack on New Years [place]. Reports hx of heavy drinking and had an esophageal bleed. Last year in March he was \"kicked out of the liver clinic\" because his liver function imprved signficantly after he stopped drinking alochol. He had did better from a nausea standpoint after that. He has chronic pain, s/p failed back surgery, and reported with the chronic pain he felt severaly nauseous. He would wake in the night, smoke marijuana for the nausea, and would be ok. He then was dealing with drop foot, a bone spur damaged his achilles tendon, s/p multiple tears of the " achilles tendon. Throughout that time due to pain he had nausea. In 1/2024, he had a nonhealing wound around his achilles tendon. He was dx with peripheral artery disease which is why things didn't heal.  He had to increase narcotic doses to manage the pain. He then had a partial leg amputation. He reports while in cardiac rehab after his heart attack, he had norovirus and C diff at the same time.     He had managed nausea with zofran. States for 2 weeks mid April he was vomiting on a daily basis, but it was more dry heaves, states wasn't eating anything around this time. Was drinking protein drinks instead. States vomiting/dry heaves subsided April 18th, about 10 days ago. He feels his prosthetic is giving him nausea, and will have an adjustment to his spinal cord stimulator to improve the pain coming up.     States he is most concerned about his weight loss. He reduced the fat continent of his diet, and has been less mobile since the leg surgery. States he has been cycling and has lost a lot of weight. Denies abdominal pain after eating.     States in the morning he has yogurt and granola with fruit. For lunch he has pasta salad, shrimp, spinach salad.     He has zofran, takes twice daily, doesn't feel it helps the nausea. States smoking marijuana is what works.     States A1c has been very well controlled, always within the 5-6% range for many years per patient.     Bowel movements are daily without laxatives, stools are soft but formed. No blood in the stool.     He takes pantoprazole but doesn't have any reflux or heartburn.     Reports eating a good portion size without early satiety     Family hx: dad passed of esophageal cancer at age 56, no family history of colon cancer   He plans to do cologuard   Hx colonoscopy 2016: stool in colon, recommended to repeat in 3 years.   EGD - 2018: hematin in the stomach, portal hypertensive gastropathy     CT c/a/p 8 days ago without acute pathology     Wt Readings from  Last 10 Encounters:   04/28/25 68 kg (150 lb)   04/08/25 69.4 kg (153 lb)   02/26/25 72.6 kg (160 lb)   02/17/25 77.1 kg (170 lb)   02/05/25 77.3 kg (170 lb 6.4 oz)   02/03/25 77.1 kg (170 lb)   01/31/25 77.1 kg (170 lb)   01/29/25 77.3 kg (170 lb 6.4 oz)   01/28/25 78 kg (172 lb)   01/28/25 77.3 kg (170 lb 6.4 oz)       I have reviewed and updated the patient's Past Medical History, Social History, Family History and Medication List.    Exam:    Constitutional - general appearance is well and in no acute distress. Body habitus normal  Eyes - No redness or discharge  Respiratory - No cough, unlabored breathing  Musculoskeletal - range of motion intact: Neck and arms  Skin - No discoloration or lesions  Neurological - No tremors  Psychiatric - No anxiety, alert & oriented     PERTINENT STUDIES have been reviewed.    ASSESSMENT/PLAN:    Erwin Aguilar is a 64 year old male who presents for evaluation of weight loss, nausea, vomiting     1. Gastroparesis  2. Nausea  3. Vomiting  - Adult GI  Referral - Consult Only  4. Weight loss     Erwin Aguilar is a 64 year old male with past medical history significant for type 2 diabetes, neuropathy, complex regional pain syndrome type 1, CAD, moderate persistent asthma, GERD, alcoholic cirrhosis of the liver, gastroparesis, neurogenic bowel and bladder, paroxysmal atrial fibrillation, hx of TIA and STEMI, alcoholism in remission, opioid dependence who is being evaluated via a billable video visit for gastroparesis, nausea and vomiting. He reports he had a bout of dry heaving in April which has since resolved. He has chronic nausea, which he attributes to chronic pain and many of his medical problems, and isnt necessarily concerned about this. He feels he has this well managed with medical marijuana. He also uses zofran, though feels it is less effective. It is possible that he has cannabis hyperemesis syndrome.    He has never had a NM GES and has had well controlled  diabetes for years per patient, so unclear if he actually has gastroparesis. We discussed obtaining a gastric emptying study but he would like to hold off for now.     He attributes his weight loss of 20 lbs over the last 3 months to be from his medical problems including heart attack s/p CABG and below the knee amputation now using wheelchair. He feels he is regaining his strength.     He had a CT c/a/p last week without acute findings.     We discussed colonoscopy and EGD but he would not like to proceed due to risks of anesthesia at this time.     He would prefer to follow up as needed.    Brenda Mederos PA-C    RTC PRN per patient preference     Thank you for this consultation.  It was a pleasure to participate in the care of this patient; please contact us with any further questions. 52 minutes spent on the date of the encounter doing chart review, history and exam, documentation and further activities per the note.    Brenda Mederos PA-C  Division of Gastroenterology, Hepatology and Nutrition  [facility]      Again, thank you for allowing me to participate in the care of your patient.        Sincerely,        Brenda Mederos PA-C    Electronically signed

## 2025-04-29 ENCOUNTER — MYC MEDICAL ADVICE (OUTPATIENT)
Dept: FAMILY MEDICINE | Facility: CLINIC | Age: 65
End: 2025-04-29

## 2025-04-29 ENCOUNTER — OFFICE VISIT (OUTPATIENT)
Dept: ANESTHESIOLOGY | Facility: CLINIC | Age: 65
End: 2025-04-29
Payer: MEDICARE

## 2025-04-29 VITALS
OXYGEN SATURATION: 100 % | SYSTOLIC BLOOD PRESSURE: 126 MMHG | RESPIRATION RATE: 16 BRPM | HEART RATE: 74 BPM | DIASTOLIC BLOOD PRESSURE: 80 MMHG

## 2025-04-29 DIAGNOSIS — M79.609 AMPUTATION STUMP PAIN (H): Primary | ICD-10-CM

## 2025-04-29 DIAGNOSIS — T87.89 AMPUTATION STUMP PAIN (H): Primary | ICD-10-CM

## 2025-04-29 PROCEDURE — 3079F DIAST BP 80-89 MM HG: CPT | Performed by: ANESTHESIOLOGY

## 2025-04-29 PROCEDURE — 3074F SYST BP LT 130 MM HG: CPT | Performed by: ANESTHESIOLOGY

## 2025-04-29 PROCEDURE — 99214 OFFICE O/P EST MOD 30 MIN: CPT | Performed by: ANESTHESIOLOGY

## 2025-04-29 PROCEDURE — 1125F AMNT PAIN NOTED PAIN PRSNT: CPT | Performed by: ANESTHESIOLOGY

## 2025-04-29 ASSESSMENT — PAIN SCALES - GENERAL: PAINLEVEL_OUTOF10: MODERATE PAIN (6)

## 2025-04-29 NOTE — TELEPHONE ENCOUNTER
Dr. Wegener,    Please see below [employer] message - appears to be FYI, no response needed    Thanks,   Brenda ARZATE RN

## 2025-04-29 NOTE — PROGRESS NOTES
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   He has a long history of chronic low back pain.  In early 1990s, he had lower back surgery subsequently in 2009 he had another surgery by Dr. Stein due to cauda equina syndrome.  In 2010, he underwent SCS by Dr. Garcia at [facility] (currently [facility]), which afforded significant pain relief.  He had revision of SCS  by Dr. Sauceda in 2018.  Unfortunately he has not been getting good coverage of the SCS now.  He has seen Dr. Garcia in May 2021 and had SCS reprogrammed.  The top of the left SCS lead is at the T11 and top of the right SCS lead a at T12.  He also established with a pain psychologist for his underlying anxiety and depression.  He has been certified for medical cannabis and has been using consistently with some benefit.      As a sequela of the previous spine surgeries, he developed right foot drop.  He also had Achilles tendon repair in December 2020 and had a complicated recovery and needed wound VAC for 3 months postop. At some point, he was diagnosed with CRPS.  Then he had bony spur on the right ankle for which he had surgery.  Since then he developed right ankle/foot pain.  He ultimately had right below-knee amputation by Dr. Nye.  Postoperative course complicated by persistent pain wound infection which is now resolved.  Unfortunately he had a heart attack and CABG a few months after the amputation surgery.  He came today for spinal cord stimulator reprogramming to get additional coverage for for his amputation stump pain      [place] Prescription Monitoring  Program:   Reviewed. No concerns     Review of Systems:  The 14 system ROS was reviewed and was negative except what is documented above and as follows.  Any bowel or bladder problems: none  Mood: okay    Physical Exam:  Vitals:    04/29/25 0916   BP: 126/80   Pulse: 74   Resp: 16   SpO2: 100%       General: Awake in no apparent distress.   Eyes: Sclerae are anicteric. PERRLA, EOMI   Neck: supple, no masses.   Lungs: unlabored.   Heart: regular rate and rhythm   Abdomen: soft non tender.  Extremities: Pulses are well palpable, no peripheral edema.   Musculoskeletal: Right knee amputation site well-healed.  A small wound noted on the medial aspect  Neurologic exam:Sensation intact throughout all dermatomes bilateral upper extremities and lower extremities  Psychiatric; Normal affect.   Skin: Warm and Dry.    Medications:  Current Outpatient Medications   Medication Sig Dispense Refill    albuterol (PROAIR HFA/PROVENTIL HFA/VENTOLIN HFA) 108 (90 Base) MCG/ACT inhaler INHALE 2 PUFFS BY MOUTH EVERY 6 HOURS AS NEEDED FOR SHORTNESS OF BREATH OR WHEEZING 18 g 11    aspirin (ASA) 81 MG chewable tablet Take 1 tablet (81 mg) by mouth daily. 30 tablet 0    baclofen (LIORESAL) 10 MG tablet Take 1 tablet (10 mg) by mouth 3 times daily as needed for muscle spasms. 20 tablet 0    blood glucose (NO BRAND SPECIFIED) test strip Use to test blood sugar 3 times daily or as directed. To accompany: Blood Glucose Monitor Brands: per insurance. 100 strip 2    blood glucose monitoring (NO BRAND SPECIFIED) meter device kit Use to test blood sugar 3 times daily or as directed. Preferred blood glucose meter OR supplies to accompany: Blood Glucose Monitor Brands: per insurance. 1 kit 0    diclofenac (VOLTAREN) 1 % topical gel Apply 4 g topically 4 times daily. 480 g 0    gabapentin (NEURONTIN) 300 MG capsule Take 2 capsules (600 mg) by mouth 3 times daily. 540 capsule 3    glucose 40 % (400 mg/mL) gel Take 15-30 g by mouth every 15 minutes as  needed for low blood sugar. (Patient taking differently: Take by mouth every 15 minutes as needed for low blood sugar.) 37.5 mL 0    insulin glargine (LANTUS PEN) 100 UNIT/ML pen Inject 7 Units subcutaneously at bedtime. HOLD if Blood Glucose<120 3 mL 3    losartan (COZAAR) 25 MG tablet Take 1 tablet (25 mg) by mouth daily. HOLD if SBP<100 90 tablet 1    medical cannabis (Patient's own supply) See Admin Instructions (The purpose of this order is to document that the patient reports taking medical cannabis.  This is not a prescription, and is not used to certify that the patient has a qualifying medical condition.)   Flower - PRN      metoprolol succinate ER (TOPROL XL) 50 MG 24 hr tablet Take 1 tablet (50 mg) by mouth daily. 90 tablet 3    multivitamin, therapeutic (THERA-VIT) TABS tablet Take 1 tablet by mouth daily. 30 tablet 0    ondansetron (ZOFRAN) 4 MG tablet Take 1 tablet (4 mg) by mouth 2 times daily as needed for nausea. 60 tablet 0    oxyCODONE IR (ROXICODONE) 10 MG tablet Take 1 tablet (10 mg) by mouth 5 x daily PRN for severe pain. #150 for one month supply 150 tablet 0    pantoprazole (PROTONIX) 40 MG EC tablet Take 1 tablet (40 mg) by mouth daily. 90 tablet 3    rosuvastatin (CRESTOR) 40 MG tablet Take 1 tablet (40 mg) by mouth daily. 90 tablet 0    sertraline (ZOLOFT) 100 MG tablet TAKE 2 TABLETS BY MOUTH DAILY 60 tablet 0    thiamine (B-1) 100 MG tablet Take 1 tablet (100 mg) by mouth daily. 30 tablet 0    thin (NO BRAND SPECIFIED) lancets Use with lanceting device. To accompany: Blood Glucose Monitor Brands: per insurance. 100 each 2    triamterene-HCTZ (DYAZIDE) 37.5-25 MG capsule Take 1 capsule by mouth every morning. HOLD if SBP<100 90 capsule 0    senna-docusate (SENOKOT-S/PERICOLACE) 8.6-50 MG tablet Take 1 tablet by mouth or Feeding Tube 2 times daily as needed for constipation. 30 tablet 0       Analgesic Medications:   Medications related to Pain Management (From now, onward)      None                LABORATORY VALUES:   Recent Labs   Lab Test 03/26/25  1747 03/26/25  1629 02/04/25  1052   NA  --  141 134*   POTASSIUM  --  3.2* 5.0   CHLORIDE  --  104 99   CO2  --  21* 23   ANIONGAP  --  16* 12   * 142* 130*   BUN  --  12.4 8.4   CR  --  0.52* 0.57*   AFRICA  --  10.0 9.3       CBC RESULTS:   Recent Labs   Lab Test 03/26/25  1629   WBC 9.3   RBC 5.24   HGB 14.5   HCT 42.9   MCV 82   MCH 27.7   MCHC 33.8   RDW 13.7          Most Recent 3 INR's:  Recent Labs   Lab Test 01/28/25  0315 01/22/25  0413 01/03/25  0336   INR 1.16* 1.37* 1.29*           ASSESSMENT:    Post amputation stump pain  Status post multiple dorsal column stimulator  Major depressive disorder  Generalized anxiety disorder  Status post CABG        Diagnoses         Codes Comments    Amputation stump pain (H)    -  Primary T87.89, M79.609           PLAN:     - Medications.      Continue medical cannabis  Baclofen 20 mg 3 times daily  Start gabapentin 300 mg 3 times daily        - Interventional procedures:     Spinal cord dorsal column stimulator reprogramming performed by [employer] rep under my guidance     - Labs and imaging: None needed    - Rehab: None     - Psychology: No current needs.  He has been taking Zoloft      - Integrated medicine: None     - Disposition:  Clinic follow-up as needed.    Assessment will be ongoing with changes in treatment as indicated.  Benefits/risks/alternatives to treatment have been reviewed and the patient has been instructed to contact this office if they have any questions or concerns.  This plan of care has been discussed with the patient and the patient is in agreement.     Carlito Grimm MD, PHD

## 2025-04-29 NOTE — NURSING NOTE
Patient presents with:  Pain  Pain Management  RECHECK: Follow up-      Moderate Pain (6)     Pain Medications       Salicylates Refills Start End     aspirin (ASA) 81 MG chewable tablet 0 2/7/2025 --    Sig - Route: Take 1 tablet (81 mg) by mouth daily. - Oral    Class: E-Prescribe    Renewals       Renewal requests to authorizing provider (Allison Conley, APRN CNP) <b>prohibited</b>               Opioid Agonists Refills Start End     oxyCODONE IR (ROXICODONE) 10 MG tablet 0 4/24/2025 --    Sig - Route: Take 1 tablet (10 mg) by mouth 5 x daily PRN for severe pain. #150 for one month supply - Oral    Class: E-Prescribe    Earliest Fill Date: 4/24/2025            What medications are you using for pain? Oxycodone     What refills are you needing today? none    Expectations: follow up    Viktoria Hernandez LPN

## 2025-04-29 NOTE — LETTER
4/29/2025       RE: Erwin Aguilar  [address]  [place] [address]     Dear Colleague,    Thank you for referring your patient, Erwin Aguilar, to the [facility] [place] at [facility]. Please see a copy of my visit note below.    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   He has a long history of chronic low back pain.  In early 1990s, he had lower back surgery subsequently in 2009 he had another surgery by Dr. Stein due to cauda equina syndrome.  In 2010, he underwent SCS by Dr. Garcia at [facility] (currently [facility]), which afforded significant pain relief.  He had revision of SCS  by Dr. Sauceda in 2018.  Unfortunately he has not been getting good coverage of the SCS now.  He has seen Dr. Garcia in May 2021 and had SCS reprogrammed.  The top of the left SCS lead is at the T11 and top of the right SCS lead a at T12.  He also established with a pain psychologist for his underlying anxiety and depression.  He has been certified for medical cannabis and has been using consistently with some benefit.      As a sequela of the previous spine surgeries, he developed right foot drop.  He also had Achilles tendon repair in December 2020 and had a complicated recovery and needed wound VAC for 3 months postop. At some point, he was diagnosed with CRPS.  Then he had bony spur on the right ankle for which he had surgery.  Since then he developed right ankle/foot pain.  He ultimately had right below-knee amputation by   Parris.  Postoperative course complicated by persistent pain wound infection which is now resolved.  Unfortunately he had a heart attack and CABG a few months after the amputation surgery.  He came today for spinal cord stimulator reprogramming to get additional coverage for for his amputation stump pain      [place] Prescription Monitoring Program:   Reviewed. No concerns     Review of Systems:  The 14 system ROS was reviewed and was negative except what is documented above and as follows.  Any bowel or bladder problems: none  Mood: okay    Physical Exam:  Vitals:    04/29/25 0916   BP: 126/80   Pulse: 74   Resp: 16   SpO2: 100%       General: Awake in no apparent distress.   Eyes: Sclerae are anicteric. PERRLA, EOMI   Neck: supple, no masses.   Lungs: unlabored.   Heart: regular rate and rhythm   Abdomen: soft non tender.  Extremities: Pulses are well palpable, no peripheral edema.   Musculoskeletal: Right knee amputation site well-healed.  A small wound noted on the medial aspect  Neurologic exam:Sensation intact throughout all dermatomes bilateral upper extremities and lower extremities  Psychiatric; Normal affect.   Skin: Warm and Dry.    Medications:  Current Outpatient Medications   Medication Sig Dispense Refill     albuterol (PROAIR HFA/PROVENTIL HFA/VENTOLIN HFA) 108 (90 Base) MCG/ACT inhaler INHALE 2 PUFFS BY MOUTH EVERY 6 HOURS AS NEEDED FOR SHORTNESS OF BREATH OR WHEEZING 18 g 11     aspirin (ASA) 81 MG chewable tablet Take 1 tablet (81 mg) by mouth daily. 30 tablet 0     baclofen (LIORESAL) 10 MG tablet Take 1 tablet (10 mg) by mouth 3 times daily as needed for muscle spasms. 20 tablet 0     blood glucose (NO BRAND SPECIFIED) test strip Use to test blood sugar 3 times daily or as directed. To accompany: Blood Glucose Monitor Brands: per insurance. 100 strip 2     blood glucose monitoring (NO BRAND SPECIFIED) meter device kit Use to test blood sugar 3 times daily or as directed. Preferred blood glucose  meter OR supplies to accompany: Blood Glucose Monitor Brands: per insurance. 1 kit 0     diclofenac (VOLTAREN) 1 % topical gel Apply 4 g topically 4 times daily. 480 g 0     gabapentin (NEURONTIN) 300 MG capsule Take 2 capsules (600 mg) by mouth 3 times daily. 540 capsule 3     glucose 40 % (400 mg/mL) gel Take 15-30 g by mouth every 15 minutes as needed for low blood sugar. (Patient taking differently: Take by mouth every 15 minutes as needed for low blood sugar.) 37.5 mL 0     insulin glargine (LANTUS PEN) 100 UNIT/ML pen Inject 7 Units subcutaneously at bedtime. HOLD if Blood Glucose<120 3 mL 3     losartan (COZAAR) 25 MG tablet Take 1 tablet (25 mg) by mouth daily. HOLD if SBP<100 90 tablet 1     medical cannabis (Patient's own supply) See Admin Instructions (The purpose of this order is to document that the patient reports taking medical cannabis.  This is not a prescription, and is not used to certify that the patient has a qualifying medical condition.)   Flower - PRN       metoprolol succinate ER (TOPROL XL) 50 MG 24 hr tablet Take 1 tablet (50 mg) by mouth daily. 90 tablet 3     multivitamin, therapeutic (THERA-VIT) TABS tablet Take 1 tablet by mouth daily. 30 tablet 0     ondansetron (ZOFRAN) 4 MG tablet Take 1 tablet (4 mg) by mouth 2 times daily as needed for nausea. 60 tablet 0     oxyCODONE IR (ROXICODONE) 10 MG tablet Take 1 tablet (10 mg) by mouth 5 x daily PRN for severe pain. #150 for one month supply 150 tablet 0     pantoprazole (PROTONIX) 40 MG EC tablet Take 1 tablet (40 mg) by mouth daily. 90 tablet 3     rosuvastatin (CRESTOR) 40 MG tablet Take 1 tablet (40 mg) by mouth daily. 90 tablet 0     sertraline (ZOLOFT) 100 MG tablet TAKE 2 TABLETS BY MOUTH DAILY 60 tablet 0     thiamine (B-1) 100 MG tablet Take 1 tablet (100 mg) by mouth daily. 30 tablet 0     thin (NO BRAND SPECIFIED) lancets Use with lanceting device. To accompany: Blood Glucose Monitor Brands: per insurance. 100 each 2      triamterene-HCTZ (DYAZIDE) 37.5-25 MG capsule Take 1 capsule by mouth every morning. HOLD if SBP<100 90 capsule 0     senna-docusate (SENOKOT-S/PERICOLACE) 8.6-50 MG tablet Take 1 tablet by mouth or Feeding Tube 2 times daily as needed for constipation. 30 tablet 0       Analgesic Medications:   Medications related to Pain Management (From now, onward)      None               LABORATORY VALUES:   Recent Labs   Lab Test 03/26/25  1747 03/26/25  1629 02/04/25  1052   NA  --  141 134*   POTASSIUM  --  3.2* 5.0   CHLORIDE  --  104 99   CO2  --  21* 23   ANIONGAP  --  16* 12   * 142* 130*   BUN  --  12.4 8.4   CR  --  0.52* 0.57*   AFRICA  --  10.0 9.3       CBC RESULTS:   Recent Labs   Lab Test 03/26/25  1629   WBC 9.3   RBC 5.24   HGB 14.5   HCT 42.9   MCV 82   MCH 27.7   MCHC 33.8   RDW 13.7          Most Recent 3 INR's:  Recent Labs   Lab Test 01/28/25  0315 01/22/25  0413 01/03/25  0336   INR 1.16* 1.37* 1.29*           ASSESSMENT:    Post amputation stump pain  Status post multiple dorsal column stimulator  Major depressive disorder  Generalized anxiety disorder  Status post CABG        Diagnoses         Codes Comments    Amputation stump pain (H)    -  Primary T87.89, M79.609           PLAN:     - Medications.      Continue medical cannabis  Baclofen 20 mg 3 times daily  Start gabapentin 300 mg 3 times daily        - Interventional procedures:     Spinal cord dorsal column stimulator reprogramming performed by [employer] rep under my guidance     - Labs and imaging: None needed    - Rehab: None     - Psychology: No current needs.  He has been taking Zoloft      - Integrated medicine: None     - Disposition:  Clinic follow-up as needed.    Assessment will be ongoing with changes in treatment as indicated.  Benefits/risks/alternatives to treatment have been reviewed and the patient has been instructed to contact this office if they have any questions or concerns.  This plan of care has been discussed with  the patient and the patient is in agreement.     Carlito Grimm MD, PHD      Again, thank you for allowing me to participate in the care of your patient.      Sincerely,    Carlito Grimm MD

## 2025-05-08 ENCOUNTER — VIRTUAL VISIT (OUTPATIENT)
Dept: PHYSICAL THERAPY | Facility: CLINIC | Age: 65
End: 2025-05-08
Attending: STUDENT IN AN ORGANIZED HEALTH CARE EDUCATION/TRAINING PROGRAM
Payer: MEDICARE

## 2025-05-08 DIAGNOSIS — Z89.511 S/P BELOW KNEE AMPUTATION, RIGHT (H): Primary | ICD-10-CM

## 2025-05-08 PROCEDURE — 97110 THERAPEUTIC EXERCISES: CPT | Mod: GP,GT,95

## 2025-05-08 PROCEDURE — 97530 THERAPEUTIC ACTIVITIES: CPT | Mod: GP,GT,95

## 2025-05-12 ENCOUNTER — MYC MEDICAL ADVICE (OUTPATIENT)
Dept: FAMILY MEDICINE | Facility: CLINIC | Age: 65
End: 2025-05-12
Payer: MEDICARE

## 2025-05-13 ENCOUNTER — OFFICE VISIT (OUTPATIENT)
Dept: PHYSICAL MEDICINE AND REHAB | Facility: CLINIC | Age: 65
End: 2025-05-13
Payer: MEDICARE

## 2025-05-13 VITALS
HEART RATE: 70 BPM | DIASTOLIC BLOOD PRESSURE: 68 MMHG | BODY MASS INDEX: 21.56 KG/M2 | WEIGHT: 154 LBS | SYSTOLIC BLOOD PRESSURE: 114 MMHG | HEIGHT: 71 IN

## 2025-05-13 DIAGNOSIS — F41.1 GENERALIZED ANXIETY DISORDER: ICD-10-CM

## 2025-05-13 DIAGNOSIS — Z89.511 S/P BELOW KNEE AMPUTATION, RIGHT (H): Primary | ICD-10-CM

## 2025-05-13 RX ORDER — SERTRALINE HYDROCHLORIDE 100 MG/1
200 TABLET, FILM COATED ORAL
Qty: 60 TABLET | Refills: 0 | OUTPATIENT
Start: 2025-05-13

## 2025-05-13 NOTE — PATIENT INSTRUCTIONS
We will recommend changes to the socket to try with Jaren prior to finalizing it.  Continue to do walking and transfer training.  Talk to your physical therapist about your plans to cycling as a recreational activity. They may be able to help with cycling specific training.

## 2025-05-13 NOTE — LETTER
2025       RE: Erwin Aguilar  [address]  [place] [address]     Dear Colleague,    Thank you for referring your patient, Erwin Aguilar, to the [facility] PHYSICAL MEDICINE AND REHABILITATION CLINIC [place] at [facility] [place]. Please see a copy of my visit note below.         PM&R Followup Note     Patient Name: Erwin Aguilar : 1960 Medical Record: [id number]     Amputee Information:  Level of Amputation:  Right transtibial  : Jaren Fruci  Date of Surgery: 24  Etiology: Failed femoral artery stent placement         Subjective:     Last visit summary:   He continued to have leg pain that wasn't improved with a high dose of gabapentin per day, making him unable to use the socket for any length of time.  Not wearing the prosthesis solves the pain.  I offered leg MRI, and he requested conscious sedation, later deciding to explore the issue with his pain team first.  They reprogrammed his stimulator with some success in functional improvement in his leg.  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  He's been looking to transition from oxycodone to buprenorphine and is talking to his prosthetist for continued fitting.  He is undergoing virtual PT.    Interval History:   Increasing the gabapentin helped reduce the nerve pain in his leg.  Topicals not as much.  The stimulator was reprogrammed to adjust for the dermatomes of his right leg.  He's thinking of having the leads moved when he has to replace the device.  He'll talk to Dr. Ramirez about transitioning the oxycodone to buprenorphine on Thursday.    When he is sitting at a 90 degree angle on his knee, his heel is elevated.  He's wondering if the angle of his ankle can be adjusted.  He also is thinking of having an ankle that is less rigid and reduces the impact when standing and walking.  He doesn't aim to be running.  He wants to be able to ride his  bicycle primarily.  He understandings that an hydraulic ankle would be heavier.  He's now wearing the prosthesis walking an hour a day and with the leg on about 5 to 6 hours a day.  He especially uses the prosthesis in his carpeted bedroom and bathroom.  He walks about 12 blocks with his walker.         Medications:     Current Outpatient Medications   Medication Sig Dispense Refill     albuterol (PROAIR HFA/PROVENTIL HFA/VENTOLIN HFA) 108 (90 Base) MCG/ACT inhaler INHALE 2 PUFFS BY MOUTH EVERY 6 HOURS AS NEEDED FOR SHORTNESS OF BREATH OR WHEEZING 18 g 11     aspirin (ASA) 81 MG chewable tablet Take 1 tablet (81 mg) by mouth daily. 30 tablet 0     baclofen (LIORESAL) 10 MG tablet Take 1 tablet (10 mg) by mouth 3 times daily as needed for muscle spasms. 20 tablet 0     blood glucose (NO BRAND SPECIFIED) test strip Use to test blood sugar 3 times daily or as directed. To accompany: Blood Glucose Monitor Brands: per insurance. 100 strip 2     blood glucose monitoring (NO BRAND SPECIFIED) meter device kit Use to test blood sugar 3 times daily or as directed. Preferred blood glucose meter OR supplies to accompany: Blood Glucose Monitor Brands: per insurance. 1 kit 0     gabapentin (NEURONTIN) 300 MG capsule Take 2 capsules (600 mg) by mouth 3 times daily. 540 capsule 3     glucose 40 % (400 mg/mL) gel Take 15-30 g by mouth every 15 minutes as needed for low blood sugar. (Patient taking differently: Take by mouth every 15 minutes as needed for low blood sugar.) 37.5 mL 0     insulin glargine (LANTUS PEN) 100 UNIT/ML pen Inject 7 Units subcutaneously at bedtime. HOLD if Blood Glucose<120 3 mL 3     losartan (COZAAR) 25 MG tablet Take 1 tablet (25 mg) by mouth daily. HOLD if SBP<100 90 tablet 1     medical cannabis (Patient's own supply) See Admin Instructions (The purpose of this order is to document that the patient reports taking medical cannabis.  This is not a prescription, and is not used to certify that the patient  has a qualifying medical condition.)   Flower - PRN       metoprolol succinate ER (TOPROL XL) 50 MG 24 hr tablet Take 1 tablet (50 mg) by mouth daily. 90 tablet 3     multivitamin, therapeutic (THERA-VIT) TABS tablet Take 1 tablet by mouth daily. 30 tablet 0     ondansetron (ZOFRAN) 4 MG tablet Take 1 tablet (4 mg) by mouth 2 times daily as needed for nausea. 60 tablet 0     oxyCODONE IR (ROXICODONE) 10 MG tablet Take 1 tablet (10 mg) by mouth 5 x daily PRN for severe pain. #150 for one month supply 150 tablet 0     pantoprazole (PROTONIX) 40 MG EC tablet Take 1 tablet (40 mg) by mouth daily. 90 tablet 3     rosuvastatin (CRESTOR) 40 MG tablet Take 1 tablet (40 mg) by mouth daily. 90 tablet 0     senna-docusate (SENOKOT-S/PERICOLACE) 8.6-50 MG tablet Take 1 tablet by mouth or Feeding Tube 2 times daily as needed for constipation. 30 tablet 0     sertraline (ZOLOFT) 100 MG tablet TAKE 2 TABLETS BY MOUTH DAILY 60 tablet 0     thiamine (B-1) 100 MG tablet Take 1 tablet (100 mg) by mouth daily. 30 tablet 0     thin (NO BRAND SPECIFIED) lancets Use with lanceting device. To accompany: Blood Glucose Monitor Brands: per insurance. 100 each 2     triamterene-HCTZ (DYAZIDE) 37.5-25 MG capsule Take 1 capsule by mouth every morning. HOLD if SBP<100 90 capsule 0            Allergies:     Allergies   Allergen Reactions     Levaquin Anaphylaxis and Rash     Swelling in lip and tongue felt thick     Lisinopril Anaphylaxis     Swollen tongue; inability to swallow; drooling; hives; swollen face, neck, angioedema     Acetaminophen      Hx of cirrhosis      Amlodipine      Swelling, hives, possible angioedema       Morphine      b/p dropped and arms went numb  Hypotension     Quinolones Hives     Spironolactone Unknown     Pt believes himself to be allergic to it; cannot find his old records of this.-mjc      Bactrim [Sulfamethoxazole-Trimethoprim] Rash            ROS:     A focused ROS is negative other than the symptoms noted above  "in the HPI.         Physical Examination:     VITAL SIGNS: There were no vitals taken for this visit.  BMI: Estimated body mass index is 20.92 kg/m  as calculated from the following:    Height as of 4/8/25: 1.803 m (5' 11\").    Weight as of 4/28/25: 68 kg (150 lb).  Gait: He is slightly leaning over the right leg. No antalgia. Right hip descended mildly more than left.  Strength:   HFlex HEx HAdd HAbd KEx KFlex ADF APF   R 5 5 5 5 5 5 NA NA   L 5 5 5 5 5 5 5 5   Residual right leg: Skin intact, no areas of erythema. Tenderness posterior and distal to the end of the tibia in redundant tissue at the end of the leg, also slightly medial.  Mild induration felt deep to the surface at tender points.  Left lower extremity: No deformity, contractures, or tenderness.  Prosthesis: His check socket is very very loose, with very large padding adjustments anteriorly. Prior to adjustment, heel is not touching the ground when sitting with knees at 90 degree flexion.          Labs:     Lipids  Recent Labs   Lab Test 04/23/24  1155 01/09/24  0932 10/21/22  1006 02/12/22  1113   CHOL 143 177 176 161   LDL 70 113* 105* 86   HDL 60 43 47 36*   TRIG 65 105 120 194*     HgbA1c  Recent Labs   Lab Test 12/31/24  1327 05/03/24  1105 01/09/24  0932 08/10/23  0710   A1C 5.7* 5.9* 5.9* 6.7*            Assessment/Plan:     Erwin Aguilar is a 64 year old male with a relevant PMH of osteoarthritis, DM2, cirrhosis, cauda equina syndrome, and chronic pain who had a right transtibial amputation on 5/9/24 due to peripheral vascular disease.     #Right TTA  Prosthesis: The socket needs to be recast prior to finalization.  Different types of suspension like vacuum that helps prevent movement of his distal limb impacting against the socket may be more ideal.  Due to the loss of sensation, discomfort factors associated with these suspension types may be less of an issue.  - continue physical therapy for basic ADL training during fitting. Patient to " discuss recreational cycling activity training with therapist.  - Prosthetist recommended to recast the socket and trial vacuum / suction suspension.  - ordered replacement liners    #Follow up  - 6 months    Roland Lira MD  Physical Medicine & Rehabilitation    I spent a total of 36 minutes on the date of service doing chart review, history and exam, documentation, and further activities as noted above.      Again, thank you for allowing me to participate in the care of your patient.      Sincerely,    Roland Lira MD

## 2025-05-13 NOTE — PROGRESS NOTES
PM&R Followup Note     Patient Name: Erwin Aguilar : 1960 Medical Record: [id number]     Amputee Information:  Level of Amputation:  Right transtibial  : Jarenvivian Mckayvivek  Date of Surgery: 24  Etiology: Failed femoral artery stent placement         Subjective:     Last visit summary:   He continued to have leg pain that wasn't improved with a high dose of gabapentin per day, making him unable to use the socket for any length of time.  Not wearing the prosthesis solves the pain.  I offered leg MRI, and he requested conscious sedation, later deciding to explore the issue with his pain team first.  They reprogrammed his stimulator with some success in functional improvement in his leg.  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  He's been looking to transition from oxycodone to buprenorphine and is talking to his prosthetist for continued fitting.  He is undergoing virtual PT.    Interval History:   Increasing the gabapentin helped reduce the nerve pain in his leg.  Topicals not as much.  The stimulator was reprogrammed to adjust for the dermatomes of his right leg.  He's thinking of having the leads moved when he has to replace the device.  He'll talk to Dr. Ramirez about transitioning the oxycodone to buprenorphine on Thursday.    When he is sitting at a 90 degree angle on his knee, his heel is elevated.  He's wondering if the angle of his ankle can be adjusted.  He also is thinking of having an ankle that is less rigid and reduces the impact when standing and walking.  He doesn't aim to be running.  He wants to be able to ride his bicycle primarily.  He understandings that an hydraulic ankle would be heavier.  He's now wearing the prosthesis walking an hour a day and with the leg on about 5 to 6 hours a day.  He especially uses the prosthesis in his carpeted bedroom and bathroom.  He walks about 12 blocks with his walker.         Medications:     Current Outpatient  Medications   Medication Sig Dispense Refill    albuterol (PROAIR HFA/PROVENTIL HFA/VENTOLIN HFA) 108 (90 Base) MCG/ACT inhaler INHALE 2 PUFFS BY MOUTH EVERY 6 HOURS AS NEEDED FOR SHORTNESS OF BREATH OR WHEEZING 18 g 11    aspirin (ASA) 81 MG chewable tablet Take 1 tablet (81 mg) by mouth daily. 30 tablet 0    baclofen (LIORESAL) 10 MG tablet Take 1 tablet (10 mg) by mouth 3 times daily as needed for muscle spasms. 20 tablet 0    blood glucose (NO BRAND SPECIFIED) test strip Use to test blood sugar 3 times daily or as directed. To accompany: Blood Glucose Monitor Brands: per insurance. 100 strip 2    blood glucose monitoring (NO BRAND SPECIFIED) meter device kit Use to test blood sugar 3 times daily or as directed. Preferred blood glucose meter OR supplies to accompany: Blood Glucose Monitor Brands: per insurance. 1 kit 0    gabapentin (NEURONTIN) 300 MG capsule Take 2 capsules (600 mg) by mouth 3 times daily. 540 capsule 3    glucose 40 % (400 mg/mL) gel Take 15-30 g by mouth every 15 minutes as needed for low blood sugar. (Patient taking differently: Take by mouth every 15 minutes as needed for low blood sugar.) 37.5 mL 0    insulin glargine (LANTUS PEN) 100 UNIT/ML pen Inject 7 Units subcutaneously at bedtime. HOLD if Blood Glucose<120 3 mL 3    losartan (COZAAR) 25 MG tablet Take 1 tablet (25 mg) by mouth daily. HOLD if SBP<100 90 tablet 1    medical cannabis (Patient's own supply) See Admin Instructions (The purpose of this order is to document that the patient reports taking medical cannabis.  This is not a prescription, and is not used to certify that the patient has a qualifying medical condition.)   Flower - PRN      metoprolol succinate ER (TOPROL XL) 50 MG 24 hr tablet Take 1 tablet (50 mg) by mouth daily. 90 tablet 3    multivitamin, therapeutic (THERA-VIT) TABS tablet Take 1 tablet by mouth daily. 30 tablet 0    ondansetron (ZOFRAN) 4 MG tablet Take 1 tablet (4 mg) by mouth 2 times daily as needed for  "nausea. 60 tablet 0    oxyCODONE IR (ROXICODONE) 10 MG tablet Take 1 tablet (10 mg) by mouth 5 x daily PRN for severe pain. #150 for one month supply 150 tablet 0    pantoprazole (PROTONIX) 40 MG EC tablet Take 1 tablet (40 mg) by mouth daily. 90 tablet 3    rosuvastatin (CRESTOR) 40 MG tablet Take 1 tablet (40 mg) by mouth daily. 90 tablet 0    senna-docusate (SENOKOT-S/PERICOLACE) 8.6-50 MG tablet Take 1 tablet by mouth or Feeding Tube 2 times daily as needed for constipation. 30 tablet 0    sertraline (ZOLOFT) 100 MG tablet TAKE 2 TABLETS BY MOUTH DAILY 60 tablet 0    thiamine (B-1) 100 MG tablet Take 1 tablet (100 mg) by mouth daily. 30 tablet 0    thin (NO BRAND SPECIFIED) lancets Use with lanceting device. To accompany: Blood Glucose Monitor Brands: per insurance. 100 each 2    triamterene-HCTZ (DYAZIDE) 37.5-25 MG capsule Take 1 capsule by mouth every morning. HOLD if SBP<100 90 capsule 0            Allergies:     Allergies   Allergen Reactions    Levaquin Anaphylaxis and Rash     Swelling in lip and tongue felt thick    Lisinopril Anaphylaxis     Swollen tongue; inability to swallow; drooling; hives; swollen face, neck, angioedema    Acetaminophen      Hx of cirrhosis     Amlodipine      Swelling, hives, possible angioedema      Morphine      b/p dropped and arms went numb  Hypotension    Quinolones Hives    Spironolactone Unknown     Pt believes himself to be allergic to it; cannot find his old records of this.-mjc     Bactrim [Sulfamethoxazole-Trimethoprim] Rash            ROS:     A focused ROS is negative other than the symptoms noted above in the HPI.         Physical Examination:     VITAL SIGNS: There were no vitals taken for this visit.  BMI: Estimated body mass index is 20.92 kg/m  as calculated from the following:    Height as of 4/8/25: 1.803 m (5' 11\").    Weight as of 4/28/25: 68 kg (150 lb).  Gait: He is slightly leaning over the right leg. No antalgia. Right hip descended mildly more than " left.  Strength:   HFlex HEx HAdd HAbd KEx KFlex ADF APF   R 5 5 5 5 5 5 NA NA   L 5 5 5 5 5 5 5 5   Residual right leg: Skin intact, no areas of erythema. Tenderness posterior and distal to the end of the tibia in redundant tissue at the end of the leg, also slightly medial.  Mild induration felt deep to the surface at tender points.  Left lower extremity: No deformity, contractures, or tenderness.  Prosthesis: His check socket is very very loose, with very large padding adjustments anteriorly. Prior to adjustment, heel is not touching the ground when sitting with knees at 90 degree flexion.          Labs:     Lipids  Recent Labs   Lab Test 04/23/24  1155 01/09/24  0932 10/21/22  1006 02/12/22  1113   CHOL 143 177 176 161   LDL 70 113* 105* 86   HDL 60 43 47 36*   TRIG 65 105 120 194*     HgbA1c  Recent Labs   Lab Test 12/31/24  1327 05/03/24  1105 01/09/24  0932 08/10/23  0710   A1C 5.7* 5.9* 5.9* 6.7*            Assessment/Plan:     Erwin Aguilar is a 64 year old male with a relevant PMH of osteoarthritis, DM2, cirrhosis, cauda equina syndrome, and chronic pain who had a right transtibial amputation on 5/9/24 due to peripheral vascular disease.     #Right TTA  Prosthesis: The socket needs to be recast prior to finalization.  Different types of suspension like vacuum that helps prevent movement of his distal limb impacting against the socket may be more ideal.  Due to the loss of sensation, discomfort factors associated with these suspension types may be less of an issue.  - continue physical therapy for basic ADL training during fitting. Patient to discuss recreational cycling activity training with therapist.  - Prosthetist recommended to recast the socket and trial vacuum / suction suspension.  - ordered replacement liners    #Follow up  - 6 months    Roland Lira MD  Physical Medicine & Rehabilitation    I spent a total of 36 minutes on the date of service doing chart review, history and exam,  documentation, and further activities as noted above.

## 2025-05-15 ENCOUNTER — MYC MEDICAL ADVICE (OUTPATIENT)
Dept: FAMILY MEDICINE | Facility: CLINIC | Age: 65
End: 2025-05-15

## 2025-05-15 ENCOUNTER — PRE VISIT (OUTPATIENT)
Dept: GASTROENTEROLOGY | Facility: CLINIC | Age: 65
End: 2025-05-15

## 2025-05-15 NOTE — TELEPHONE ENCOUNTER
"Pt calling to inform care team his Insurance will not cover Suboxone.   States his appointment is \"to talk about Suboxone, so there's not point in that\".   Requests appt to be cancelled.   Pt also states he will need a refill for Oxycodone IR 10 mg.   Writer advised waiting to hear back from provider before cancelling appt, as provider may want to discuss other options, but pt stated: \"Just cancel it\" and hung up the call.   Appt cancelled as requested by patient.     Per medication dispense report, Oxycodone IR 10 mg  was filled on 04/22/25 (90 tablets).    Per chart review, PCP ordered above medication on 04/24/25 with new administration instruction of Take 1 Tablet 5 x daily PRN for severe pain (dispense 150 tablets).   Writer called [facility] on [address] and verified with pharmacist that Oxycodone IR 10 mg 5 x Daily PRN is available and can be filled today.   Writer inquired if it would be soon to fill medication.   Pharmacist indicated they will fill the medication as prescribed on 04/24/25 today, and pt can pick it up within an hour.   Out of pocket cost for 150 tablets is $283; pt's co-pay is $31.92.     Writer called pt back and informed him of the above, including co-pay amount.   Pt stated he highly doubt the  provided is accurate as he was not notified by pharmacy that he has a refill on file.   Pt also stated he was never able to fill medication earlier.   Writer explained the information obtained from pharmacist was obtained just right before calling pt.   Writer also encouraged pt to call the pharmacy and double check information and ask any questions he may have.   Pt stated: \"Well yeah; I'm going to put you on a hold. You will wait while I talk to them\".   Writer indicated pt is welcome to call the clinic back with/if any questions after speaking to the pharmacy, but writer would end the call in order to assist other patients.   Pt hung up the call.     Gely FERNANDEZ RN    "

## 2025-05-19 DIAGNOSIS — K31.84 GASTROPARESIS: ICD-10-CM

## 2025-05-19 DIAGNOSIS — R11.0 NAUSEA WITHOUT VOMITING: ICD-10-CM

## 2025-05-19 RX ORDER — ONDANSETRON 4 MG/1
TABLET, FILM COATED ORAL
Qty: 180 TABLET | Refills: 3 | Status: SHIPPED | OUTPATIENT
Start: 2025-05-19

## 2025-05-19 NOTE — TELEPHONE ENCOUNTER
Medication passed protocol, however, refill RN could not approve because needing provider review. Should therapy be completed at this time or continued?   Provider, please approve or deny the prescription.

## 2025-05-22 ENCOUNTER — VIRTUAL VISIT (OUTPATIENT)
Dept: PHYSICAL THERAPY | Facility: CLINIC | Age: 65
End: 2025-05-22
Attending: STUDENT IN AN ORGANIZED HEALTH CARE EDUCATION/TRAINING PROGRAM
Payer: MEDICARE

## 2025-05-22 DIAGNOSIS — M62.81 GENERALIZED MUSCLE WEAKNESS: ICD-10-CM

## 2025-05-22 DIAGNOSIS — Z89.511 S/P BELOW KNEE AMPUTATION, RIGHT (H): Primary | ICD-10-CM

## 2025-05-22 PROCEDURE — 97110 THERAPEUTIC EXERCISES: CPT | Mod: GP,GT,95 | Performed by: PHYSICAL THERAPIST

## 2025-05-29 ENCOUNTER — MYC MEDICAL ADVICE (OUTPATIENT)
Dept: FAMILY MEDICINE | Facility: CLINIC | Age: 65
End: 2025-05-29
Payer: MEDICARE

## 2025-06-02 ENCOUNTER — NURSE TRIAGE (OUTPATIENT)
Dept: FAMILY MEDICINE | Facility: CLINIC | Age: 65
End: 2025-06-02
Payer: MEDICARE

## 2025-06-02 NOTE — TELEPHONE ENCOUNTER
"Pt called with concerns he may have bursitis of R Elbow.   States he's been ambulating with assistance of a W/C and is using his elbows \"a lot more lately\".   Care advice given.   Writer offered appt within the 3 days disposition time frame but pt opted to report to [facility].   No actions needed from PCP's care team at this time.     Gely FERNANDEZ RN    Reason for Disposition   MILD OR MODERATE joint swelling (e.g., feels or looks mildly swollen or puffy)   Patient wants to be seen    Additional Information   Negative: Followed an elbow injury   Negative: Elbow pain is main symptom   Negative: [1] Elbow pain AND [2] fever   Negative: [1] Elbow redness AND [2] fever   Negative: Patient sounds very sick or weak to the triager   Negative: [1] SEVERE pain (e.g., excruciating, unable to use hand or wrist at all) AND [2] not improved after 2 hours of pain medicine   Negative: [1] Looks infected (spreading redness, pus) AND [2] large red area (> 2 in. or 5 cm)   Negative: All of arm looks swollen   Negative: SEVERE joint swelling (e.g., can barely bend or move elbow joint)   Negative: [1] Painful joint AND [2] no fever   Negative: Looks like a boil, infected sore, deep ulcer or other infected rash (spreading redness, pus)   Negative: Shock suspected (e.g., cold/pale/clammy skin, too weak to stand, low BP, rapid pulse)   Negative: Similar pain previously and it was from 'heart attack'   Negative: Similar pain previously and it was from 'angina', and not relieved by nitroglycerin   Negative: Sounds like a life-threatening emergency to the triager   Negative: Chest pain   Negative: Followed an elbow injury   Negative: Wound looks infected (e.g., spreading redness, pus)   Negative: Difficulty breathing or unusual sweating (e.g., sweating without exertion)   Negative: Age > 40 and associated chest or jaw pain, and pain lasting > 5 minutes   Negative: Red area or streak and fever   Negative: Swollen joint and " fever   Negative: Entire arm is swollen   Negative: Patient sounds very sick or weak to the triager   Negative: SEVERE pain (e.g., excruciating, unable to do any normal activities)   Negative: Weakness (i.e., loss of strength) in hand or fingers  (Exception: Not truly weak; hand feels weak because of pain.)   Negative: Swollen joint   Negative: Fluid-filled sack located directly over point of elbow   Negative: Looks like a boil, infected sore, deep ulcer, or other infected rash (spreading redness, pus)   Negative: Painful rash with multiple small blisters grouped together (i.e., dermatomal distribution or 'band' or 'stripe')   Negative: Numbness (i.e., loss of sensation) in hand or fingers   Negative: Can't move joint normally (bend and straighten completely)   Negative: MODERATE pain (e.g., interferes with normal activities) and present > 3 days    Protocols used: Elbow Swelling-A-, Elbow Pain-A-OH

## 2025-06-05 ENCOUNTER — OFFICE VISIT (OUTPATIENT)
Dept: ORTHOPEDICS | Facility: CLINIC | Age: 65
End: 2025-06-05
Payer: MEDICARE

## 2025-06-05 DIAGNOSIS — M70.21 OLECRANON BURSITIS, RIGHT ELBOW: Primary | ICD-10-CM

## 2025-06-05 NOTE — PROGRESS NOTES
[facility]  SPORTS & ORTHOPEDIC CLINIC VISIT     Jun 5, 2025        ASSESSMENT & PLAN    64-year-old with traumatic bursitis of the right elbow    Reviewed imaging and assessment with patient in detail  Provided with handout with education regarding treatment of bursitis.  Recommended avoidance of direct contact on the elbow and leaning on the elbow.  We discussed the signs for him to return to clinic which could include increase in pain, redness, heat.  If it has been persistent for more than 3 months aspiration could be considered as well.    Darrel Ash MD  [facility] SPORTS MEDICINE CLINIC [place]    -----  Chief Complaint   Patient presents with    Right Elbow - Pain       SUBJECTIVE  Erwin Aguilar is a/an 64 year old male who is seen as a self referral for evaluation of  right elbow pain and swelling .     The patient is seen by themselves.  The patient is Right handed    Onset: 1 week(s) ago. Patient describes injury as getting a wheel chair and thinks it is from the wheel chair   Location of Pain: right elbow   Worsened by: leaning on elbow, and achy  Better with: NA   Treatments tried: no treatment tried to date  Associated symptoms: swelling    Orthopedic/Surgical history: Ulnar Nerve Entrapment surgery   Social History/Occupation: Retired       REVIEW OF SYSTEMS:  Do you have fever, chills, weight loss? No  Do you have any vision problems? No  Do you have any chest pain or edema? No  Do you have any shortness of breath or wheezing?  No  Do you have stomach problems? No  Do you have any numbness or focal weakness? No  Do you have diabetes? Yes, type 2   Do you have problems with bleeding or clotting? No  Do you have an rashes or other skin lesions? No    OBJECTIVE:  There were no vitals taken for this visit.     Right elbow: Warm and well-perfused.  No erythema induration or fluctuance.  Localized swelling of olecranon bursa that is nontender.  Full range of motion  of the elbow without pain.  Strength intact.    RADIOLOGY:    No imaging this visit

## 2025-06-05 NOTE — LETTER
6/5/2025      RE: Erwin Aguilar  [address]  [place] [address]     Dear Colleague,    Thank you for referring your patient, Erwin Aguilar, to the [facility]. Please see a copy of my visit note below.      [facility]  SPORTS & ORTHOPEDIC CLINIC VISIT     Jun 5, 2025        ASSESSMENT & PLAN    64-year-old with traumatic bursitis of the right elbow    Reviewed imaging and assessment with patient in detail  Provided with handout with education regarding treatment of bursitis.  Recommended avoidance of direct contact on the elbow and leaning on the elbow.  We discussed the signs for him to return to clinic which could include increase in pain, redness, heat.  If it has been persistent for more than 3 months aspiration could be considered as well.    Darrel Ash MD  [facility]    -----  Chief Complaint   Patient presents with     Right Elbow - Pain       SUBJECTIVE  Erwin Aguilar is a/an 64 year old male who is seen as a self referral for evaluation of  right elbow pain and swelling .     The patient is seen by themselves.  The patient is Right handed    Onset: 1 week(s) ago. Patient describes injury as getting a wheel chair and thinks it is from the wheel chair   Location of Pain: right elbow   Worsened by: leaning on elbow, and achy  Better with: NA   Treatments tried: no treatment tried to date  Associated symptoms: swelling    Orthopedic/Surgical history: Ulnar Nerve Entrapment surgery   Social History/Occupation: Retired       REVIEW OF SYSTEMS:  Do you have fever, chills, weight loss? No  Do you have any vision problems? No  Do you have any chest pain or edema? No  Do you have any shortness of breath or wheezing?  No  Do you have stomach problems? No  Do you have any numbness or focal weakness? No  Do you have diabetes? Yes, type 2   Do you have problems with bleeding or clotting?  No  Do you have an rashes or other skin lesions? No    OBJECTIVE:  There were no vitals taken for this visit.     Right elbow: Warm and well-perfused.  No erythema induration or fluctuance.  Localized swelling of olecranon bursa that is nontender.  Full range of motion of the elbow without pain.  Strength intact.    RADIOLOGY:    No imaging this visit           Again, thank you for allowing me to participate in the care of your patient.      Sincerely,    Darrel Ash MD

## 2025-06-05 NOTE — PATIENT INSTRUCTIONS
Olecranon Bursitis (Elbow Bursitis)    WHAT IS ELBOW BURSITIS?    A bursa is a fluid-filled sac that acts as a cushion between tendons, bones, and skin. Irritation or inflammation of a bursa is called bursitis. The point of the elbow is called the olecranon. Pain or swelling at the point of the elbow is called olecranon bursitis, or elbow bursitis.    WHAT IS THE CAUSE?    Repeated injury, such as falling onto the elbow or rubbing the elbow against a hard surface, irritates the bursa.    WHAT ARE THE SYMPTOMS?    The bursa at the point of the elbow is swollen. This swelling may be painful. It may hurt to bend and straighten your elbow. There may be warmth and redness. Sometimes the fluid inside the bursa can get infected.    HOW IS IT DIAGNOSED?    Your healthcare provider will review your symptoms and examine your elbow.    HOW IS IT TREATED?    To treat this condition:    Put an ice pack, gel pack, or package of frozen vegetables wrapped in a cloth on your elbow every 3 to 4 hours for up to 20 minutes at a time until the pain and swelling go away.    Protect your elbow with a pad.  Take an anti-inflammatory medicine, such as ibuprofen, as directed by your provider. Nonsteroidal anti-inflammatory medicines (NSAIDs) may cause stomach bleeding and other problems. These risks increase with age. Read the label and take as directed. Unless recommended by your healthcare provider, do not take for more than 10 days.  Your provider may remove some of the bursa fluid with a needle and syringe. In some cases, ongoing (chronic) bursitis may require surgical removal of the bursa.    Follow your healthcare provider's instructions. Ask your provider:    How and when you will hear your test results  How long it will take to recover  What activities you should avoid and when you can return to your normal activities  How to take care of yourself at home  What symptoms or problems you should watch for and what to do if you have  them  Make sure you know when you should come back for a checkup.    HOW LONG WILL THE EFFECTS LAST?    The length of recovery depends on many factors such as your age, health, and if you have had a previous injury. Recovery time also depends on the severity of the injury. The pain is usually gone within a few weeks, but there may be swelling for up to several months. Ask your healthcare provider when you can return to your normal activities.    HOW CAN I HELP PREVENT ELBOW BURSITIS?    Elbow bursitis can be best prevented by avoiding direct contact to the point of your elbow. It is important not to irritate the bursa by leaning your elbow onto a surface such as a table or a desk.    Developed by [employer].  Published by [employer].  Copyright  2014 [employer] and/or one of its subsidiaries. All rights reserved.

## 2025-06-08 DIAGNOSIS — F41.1 GENERALIZED ANXIETY DISORDER: ICD-10-CM

## 2025-06-09 RX ORDER — SERTRALINE HYDROCHLORIDE 100 MG/1
200 TABLET, FILM COATED ORAL
Qty: 60 TABLET | Refills: 0 | OUTPATIENT
Start: 2025-06-09

## 2025-06-11 ENCOUNTER — MYC REFILL (OUTPATIENT)
Dept: FAMILY MEDICINE | Facility: CLINIC | Age: 65
End: 2025-06-11

## 2025-06-11 DIAGNOSIS — Z98.890 H/O FOOT SURGERY: ICD-10-CM

## 2025-06-11 DIAGNOSIS — M25.571 CHRONIC PAIN OF RIGHT ANKLE: ICD-10-CM

## 2025-06-11 DIAGNOSIS — M21.6X1 ACQUIRED CALCANEUS DEFORMITY OF RIGHT ANKLE: ICD-10-CM

## 2025-06-11 DIAGNOSIS — F11.90 CHRONIC, CONTINUOUS USE OF OPIOIDS: ICD-10-CM

## 2025-06-11 DIAGNOSIS — S86.011A RUPTURE OF RIGHT ACHILLES TENDON, INITIAL ENCOUNTER: ICD-10-CM

## 2025-06-11 DIAGNOSIS — M76.60 CALCIFIC ACHILLES TENDINITIS: ICD-10-CM

## 2025-06-11 DIAGNOSIS — M79.671 INTRACTABLE RIGHT HEEL PAIN: ICD-10-CM

## 2025-06-11 DIAGNOSIS — G89.4 CHRONIC PAIN SYNDROME: ICD-10-CM

## 2025-06-11 DIAGNOSIS — R63.4 UNINTENTIONAL WEIGHT LOSS: ICD-10-CM

## 2025-06-11 DIAGNOSIS — F11.20 CONTINUOUS OPIOID DEPENDENCE (H): ICD-10-CM

## 2025-06-11 DIAGNOSIS — G89.29 CHRONIC PAIN OF RIGHT ANKLE: ICD-10-CM

## 2025-06-11 RX ORDER — OXYCODONE HYDROCHLORIDE 10 MG/1
10 TABLET ORAL
Qty: 150 TABLET | Refills: 0 | Status: SHIPPED | OUTPATIENT
Start: 2025-06-13

## 2025-06-17 ENCOUNTER — PATIENT OUTREACH (OUTPATIENT)
Dept: CARE COORDINATION | Facility: CLINIC | Age: 65
End: 2025-06-17
Payer: MEDICARE

## 2025-06-19 ENCOUNTER — TELEPHONE (OUTPATIENT)
Dept: ORTHOPEDICS | Facility: CLINIC | Age: 65
End: 2025-06-19

## 2025-06-19 ENCOUNTER — MYC MEDICAL ADVICE (OUTPATIENT)
Dept: ORTHOPEDICS | Facility: CLINIC | Age: 65
End: 2025-06-19

## 2025-06-19 ENCOUNTER — PATIENT OUTREACH (OUTPATIENT)
Dept: CARE COORDINATION | Facility: CLINIC | Age: 65
End: 2025-06-19

## 2025-06-19 ENCOUNTER — MYC MEDICAL ADVICE (OUTPATIENT)
Dept: CARDIOLOGY | Facility: CLINIC | Age: 65
End: 2025-06-19

## 2025-06-19 DIAGNOSIS — Z95.1 S/P CABG (CORONARY ARTERY BYPASS GRAFT): ICD-10-CM

## 2025-06-19 DIAGNOSIS — E78.5 HYPERLIPIDEMIA LDL GOAL <100: ICD-10-CM

## 2025-06-19 DIAGNOSIS — I10 HYPERTENSION GOAL BP (BLOOD PRESSURE) < 140/90: Primary | ICD-10-CM

## 2025-06-19 NOTE — TELEPHONE ENCOUNTER
Patient confirmed scheduled appointment:  Date: 7/7/2025  Time: 9:20am  Visit type: NEW ELBOW  Provider:   Location: [facility]

## 2025-06-19 NOTE — TELEPHONE ENCOUNTER
Dr. Fox please review [facility] admission and cardiac testing. Medication changes made. Rec for 1-2 week follow-up post-discharge for evaluation of GDMT. Encourage to continue current meds as prescribed? Follow-up with you or HF team? Any alt recs? Thank you CMM,Rn

## 2025-06-19 NOTE — TELEPHONE ENCOUNTER
Dawit Fox MD to Me (Selected Message)        6/19/25 10:52 AM  I will be happy to see him in couple 3 weeks    MCM sent in response. Follow-up order placed. SM to schedulers to call and arrange. NIYA,Rn

## 2025-06-22 ENCOUNTER — MYC MEDICAL ADVICE (OUTPATIENT)
Dept: CARDIOLOGY | Facility: CLINIC | Age: 65
End: 2025-06-22
Payer: MEDICARE

## 2025-06-23 ENCOUNTER — TELEPHONE (OUTPATIENT)
Dept: CARDIOLOGY | Facility: CLINIC | Age: 65
End: 2025-06-23

## 2025-06-23 NOTE — TELEPHONE ENCOUNTER
Please address patient's request to review recent hospital records / discharge summary in regards to medication changes and determine if sooner follow-up needed (due 9/2025).  mg

## 2025-06-23 NOTE — TELEPHONE ENCOUNTER
----- Message from Queen DAVION sent at 6/23/2025  8:42 AM CDT -----  Regarding: RE: KALLIE / DO john  Hi,    This patient canceled his appt for today via [employer], last night. He was in the hospital and they changed some of his meds. He wants Dominique to review his new meds and will be seen if necessary but thinks he can wait until his September appointment to see Dr Fox, if that works.     Thanks!    ----- Message -----  From: Lyubov Castorena RN  Sent: 6/23/2025   8:17 AM CDT  To: [facility] Scheduling Registration Pool [facility]  Subject: KALLIE /  pt                                      Please call pt and resched his appt for today ([phone]) - Thanks

## 2025-07-01 ASSESSMENT — PAIN SCALES - PAIN ENJOYMENT GENERAL ACTIVITY SCALE (PEG)
AVG_PAIN_PASTWEEK: 7
INTERFERED_ENJOYMENT_LIFE: 8
INTERFERED_GENERAL_ACTIVITY: 8
PEG_TOTALSCORE: 7.67

## 2025-07-02 ENCOUNTER — OFFICE VISIT (OUTPATIENT)
Dept: ANESTHESIOLOGY | Facility: CLINIC | Age: 65
End: 2025-07-02
Payer: MEDICARE

## 2025-07-02 VITALS
SYSTOLIC BLOOD PRESSURE: 109 MMHG | DIASTOLIC BLOOD PRESSURE: 65 MMHG | HEART RATE: 96 BPM | RESPIRATION RATE: 16 BRPM | OXYGEN SATURATION: 97 %

## 2025-07-02 DIAGNOSIS — Z96.89 SPINAL CORD STIMULATOR STATUS: Primary | ICD-10-CM

## 2025-07-02 PROCEDURE — 3078F DIAST BP <80 MM HG: CPT | Performed by: ANESTHESIOLOGY

## 2025-07-02 PROCEDURE — 3074F SYST BP LT 130 MM HG: CPT | Performed by: ANESTHESIOLOGY

## 2025-07-02 PROCEDURE — 1125F AMNT PAIN NOTED PAIN PRSNT: CPT | Performed by: ANESTHESIOLOGY

## 2025-07-02 PROCEDURE — 99214 OFFICE O/P EST MOD 30 MIN: CPT | Performed by: ANESTHESIOLOGY

## 2025-07-02 ASSESSMENT — PAIN SCALES - GENERAL: PAINLEVEL_OUTOF10: MILD PAIN (3)

## 2025-07-02 NOTE — LETTER
7/2/2025       RE: Erwin Aguilar  [address]  [place] [address]     Dear Colleague,    Thank you for referring your patient, Erwin Aguilar, to the [facility] [place] at [facility]. Please see a copy of my visit note below.    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   He has a long history of chronic low back pain.  In early 1990s, he had lower back surgery subsequently in 2009 he had another surgery by Dr. Stein due to cauda equina syndrome.  In 2010, he underwent SCS by Dr. Garcia at [facility] (currently [facility]), which afforded significant pain relief.  He had revision of SCS  by Dr. Sauceda in 2018.  Unfortunately he has not been getting good coverage of the SCS now.  He has seen Dr. Garcia in May 2021 and had SCS reprogrammed.  The top of the left SCS lead is at the T11 and top of the right SCS lead a at T12.  He also established with a pain psychologist for his underlying anxiety and depression.  He has been certified for medical cannabis and has been using consistently with some benefit.      As a sequela of the previous spine surgeries, he developed right foot drop.  He also had Achilles tendon repair in December 2020 and had a complicated recovery and needed wound VAC for 3 months postop. At some point, he was diagnosed with CRPS.  Then he had bony spur on the right ankle for which he had surgery.  Since then he developed right ankle/foot pain.  He ultimately had right below-knee  amputation by Dr. Nye.  Postoperative course complicated by persistent pain wound infection which is now resolved.  Unfortunately he had a heart attack and CABG a few months after the amputation surgery.  He came today for spinal cord stimulator reprogramming to get additional coverage for for his amputation stump pain            Since the last visit, Erwin Aguilar reports:    The patient continues to take oxycodone 10 mg tablets, currently at a frequency of five times per day. His most recent refill was on June 13, 2025, at which time he received 150 tablets of oxycodone 10 mg. This represents a notable increase from his regimen in April 2025, when he was taking 10 mg three times daily. The patient acknowledges this escalation in opioid use and expresses interest in transitioning to Suboxone therapy, hoping it may offer improved pain control while reducing his reliance on short-acting opioids.    In addition, the patient reports inadequate pain coverage from his spinal cord stimulator (SCS). Despite multiple reprogramming attempts in the past, his symptom relief has diminished over time. The spinal cord stimulator has been implanted for over seven years, raising the possibility of epidural fibrosis around the leads, which could impair efficacy. A thoracolumbar spine X-ray has been ordered to evaluate for potential lead migration or hardware malfunction. The patient is open to revision surgery if structural issues are identified and believes it may improve his pain outcomes.    On a personal note, the patient shared that he is preparing to get  soon, after living with his partner for over 30 years. He expressed significant excitement about this major life event, which may also provide emotional motivation to improve his functional status and overall well-being.      [place] Prescription Monitoring Program:   Reviewed. No concerns     Review of Systems:  The 14 system ROS was reviewed and was negative  except what is documented above and as follows.  Any bowel or bladder problems: none  Mood: okay    Physical Exam:  Vitals:    07/02/25 0924   BP: 109/65   Pulse: 96   Resp: 16   SpO2: 97%       General: Awake in no apparent distress.   Eyes: Sclerae are anicteric. PERRLA, EOMI   Neck: supple, no masses.   Lungs: unlabored.   Heart: regular rate and rhythm   Abdomen: soft non tender.  Extremities: Pulses are well palpable, no peripheral edema.   Musculoskeletal: 5/5 muscle strength in all extremities.   Neurologic exam:Sensation intact throughout all dermatomes bilateral upper extremities and lower extremities  Psychiatric; Normal affect.   Skin: Warm and Dry.    Medications:  Current Outpatient Medications   Medication Sig Dispense Refill     albuterol (PROAIR HFA/PROVENTIL HFA/VENTOLIN HFA) 108 (90 Base) MCG/ACT inhaler INHALE 2 PUFFS BY MOUTH EVERY 6 HOURS AS NEEDED FOR SHORTNESS OF BREATH OR WHEEZING 18 g 11     aspirin (ASA) 81 MG chewable tablet Take 1 tablet (81 mg) by mouth daily. 30 tablet 0     baclofen (LIORESAL) 10 MG tablet Take 1 tablet (10 mg) by mouth 3 times daily as needed for muscle spasms. 20 tablet 0     blood glucose (NO BRAND SPECIFIED) test strip Use to test blood sugar 3 times daily or as directed. To accompany: Blood Glucose Monitor Brands: per insurance. 100 strip 2     blood glucose monitoring (NO BRAND SPECIFIED) meter device kit Use to test blood sugar 3 times daily or as directed. Preferred blood glucose meter OR supplies to accompany: Blood Glucose Monitor Brands: per insurance. 1 kit 0     gabapentin (NEURONTIN) 300 MG capsule Take 2 capsules (600 mg) by mouth 3 times daily. 540 capsule 3     glucose 40 % (400 mg/mL) gel Take 15-30 g by mouth every 15 minutes as needed for low blood sugar. (Patient not taking: Reported on 5/13/2025) 37.5 mL 0     insulin glargine (LANTUS PEN) 100 UNIT/ML pen Inject 7 Units subcutaneously at bedtime. HOLD if Blood Glucose<120 3 mL 3     losartan  (COZAAR) 25 MG tablet Take 1 tablet (25 mg) by mouth daily. HOLD if SBP<100 90 tablet 1     medical cannabis (Patient's own supply) See Admin Instructions (The purpose of this order is to document that the patient reports taking medical cannabis.  This is not a prescription, and is not used to certify that the patient has a qualifying medical condition.)   Flower - PRN       metoprolol succinate ER (TOPROL XL) 50 MG 24 hr tablet Take 1 tablet (50 mg) by mouth daily. 90 tablet 3     multivitamin, therapeutic (THERA-VIT) TABS tablet Take 1 tablet by mouth daily. 30 tablet 0     ondansetron (ZOFRAN) 4 MG tablet TAKE 1 TABLET(4 MG) BY MOUTH TWICE DAILY AS NEEDED FOR NAUSEA 180 tablet 3     oxyCODONE IR (ROXICODONE) 10 MG tablet Take 1 tablet (10 mg) by mouth 5 x daily PRN for severe pain. #150 for one month supply 150 tablet 0     pantoprazole (PROTONIX) 40 MG EC tablet Take 1 tablet (40 mg) by mouth daily. 90 tablet 3     rosuvastatin (CRESTOR) 40 MG tablet TAKE 1 TABLET(40 MG) BY MOUTH DAILY 90 tablet 0     sertraline (ZOLOFT) 100 MG tablet TAKE 2 TABLETS BY MOUTH DAILY 60 tablet 0     thiamine (B-1) 100 MG tablet Take 1 tablet (100 mg) by mouth daily. 30 tablet 0     thin (NO BRAND SPECIFIED) lancets Use with lanceting device. To accompany: Blood Glucose Monitor Brands: per insurance. 100 each 2     triamterene-HCTZ (DYAZIDE) 37.5-25 MG capsule Take 1 capsule by mouth every morning. HOLD if SBP<100 90 capsule 0       Analgesic Medications:   Medications related to Pain Management (From now, onward)      None               LABORATORY VALUES:   Recent Labs   Lab Test 03/26/25  1747 03/26/25  1629 02/04/25  1052   NA  --  141 134*   POTASSIUM  --  3.2* 5.0   CHLORIDE  --  104 99   CO2  --  21* 23   ANIONGAP  --  16* 12   * 142* 130*   BUN  --  12.4 8.4   CR  --  0.52* 0.57*   [place]  --  10.0 9.3       CBC RESULTS:   Recent Labs   Lab Test 03/26/25  1629   WBC 9.3   RBC 5.24   HGB 14.5   HCT 42.9   MCV 82   MCH 27.7    [facility] 33.8   RDW 13.7          Most Recent 3 INR's:  Recent Labs   Lab Test 01/28/25  0315 01/22/25  0413 01/03/25  0336   INR 1.16* 1.37* 1.29*           ASSESSMENT:     Post amputation stump pain  Status post multiple dorsal column stimulator  Major depressive disorder  Generalized anxiety disorder  Status post CABG        Diagnoses           Codes Comments     Amputation stump pain (H)    -  Primary T87.89, M79.609               PLAN:     - Medications.      Continue medical cannabis  Baclofen 20 mg 3 times daily  Start gabapentin 300 mg 3 times daily        - Interventional procedures:     Follow-up nurse appointment for spinal cord dorsal column stimulator reprogramming.    Possible spinal cord stimulator revision in the future    - Labs and imaging: Ordered thoracolumbar spine x-ray to evaluate lead migration     - Rehab: None     - Psychology: No current needs.  He has been taking Zoloft      - Integrated medicine: None     - Disposition:  Clinic follow-up as needed.     Assessment will be ongoing with changes in treatment as indicated.  Benefits/risks/alternatives to treatment have been reviewed and the patient has been instructed to contact this office if they have any questions or concerns.  This plan of care has been discussed with the patient and the patient is in agreement.      Carlito Grimm MD, PHD    Again, thank you for allowing me to participate in the care of your patient.      Sincerely,    Carlito Grimm MD

## 2025-07-02 NOTE — PROGRESS NOTES
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   He has a long history of chronic low back pain.  In early 1990s, he had lower back surgery subsequently in 2009 he had another surgery by Dr. Stein due to cauda equina syndrome.  In 2010, he underwent SCS by Dr. Garcia at [facility] (currently [facility]), which afforded significant pain relief.  He had revision of SCS  by Dr. Sauceda in 2018.  Unfortunately he has not been getting good coverage of the SCS now.  He has seen Dr. Garcia in May 2021 and had SCS reprogrammed.  The top of the left SCS lead is at the T11 and top of the right SCS lead a at T12.  He also established with a pain psychologist for his underlying anxiety and depression.  He has been certified for medical cannabis and has been using consistently with some benefit.      As a sequela of the previous spine surgeries, he developed right foot drop.  He also had Achilles tendon repair in December 2020 and had a complicated recovery and needed wound VAC for 3 months postop. At some point, he was diagnosed with CRPS.  Then he had bony spur on the right ankle for which he had surgery.  Since then he developed right ankle/foot pain.  He ultimately had right below-knee amputation by Dr. Nye.  Postoperative course complicated by persistent pain wound infection which is now resolved.  Unfortunately he had a heart attack and CABG a few months after the amputation surgery.  He came today for spinal cord stimulator reprogramming to get additional coverage for for his amputation stump pain            Since the last  visit, Erwin Aguilar reports:    The patient continues to take oxycodone 10 mg tablets, currently at a frequency of five times per day. His most recent refill was on June 13, 2025, at which time he received 150 tablets of oxycodone 10 mg. This represents a notable increase from his regimen in April 2025, when he was taking 10 mg three times daily. The patient acknowledges this escalation in opioid use and expresses interest in transitioning to Suboxone therapy, hoping it may offer improved pain control while reducing his reliance on short-acting opioids.    In addition, the patient reports inadequate pain coverage from his spinal cord stimulator (SCS). Despite multiple reprogramming attempts in the past, his symptom relief has diminished over time. The spinal cord stimulator has been implanted for over seven years, raising the possibility of epidural fibrosis around the leads, which could impair efficacy. A thoracolumbar spine X-ray has been ordered to evaluate for potential lead migration or hardware malfunction. The patient is open to revision surgery if structural issues are identified and believes it may improve his pain outcomes.    On a personal note, the patient shared that he is preparing to get  soon, after living with his partner for over 30 years. He expressed significant excitement about this major life event, which may also provide emotional motivation to improve his functional status and overall well-being.      [place] Prescription Monitoring Program:   Reviewed. No concerns     Review of Systems:  The 14 system ROS was reviewed and was negative except what is documented above and as follows.  Any bowel or bladder problems: none  Mood: okay    Physical Exam:  Vitals:    07/02/25 0924   BP: 109/65   Pulse: 96   Resp: 16   SpO2: 97%       General: Awake in no apparent distress.   Eyes: Sclerae are anicteric. PERRLA, EOMI   Neck: supple, no masses.   Lungs: unlabored.   Heart: regular rate and  rhythm   Abdomen: soft non tender.  Extremities: Pulses are well palpable, no peripheral edema.   Musculoskeletal: 5/5 muscle strength in all extremities.   Neurologic exam:Sensation intact throughout all dermatomes bilateral upper extremities and lower extremities  Psychiatric; Normal affect.   Skin: Warm and Dry.    Medications:  Current Outpatient Medications   Medication Sig Dispense Refill    albuterol (PROAIR HFA/PROVENTIL HFA/VENTOLIN HFA) 108 (90 Base) MCG/ACT inhaler INHALE 2 PUFFS BY MOUTH EVERY 6 HOURS AS NEEDED FOR SHORTNESS OF BREATH OR WHEEZING 18 g 11    aspirin (ASA) 81 MG chewable tablet Take 1 tablet (81 mg) by mouth daily. 30 tablet 0    baclofen (LIORESAL) 10 MG tablet Take 1 tablet (10 mg) by mouth 3 times daily as needed for muscle spasms. 20 tablet 0    blood glucose (NO BRAND SPECIFIED) test strip Use to test blood sugar 3 times daily or as directed. To accompany: Blood Glucose Monitor Brands: per insurance. 100 strip 2    blood glucose monitoring (NO BRAND SPECIFIED) meter device kit Use to test blood sugar 3 times daily or as directed. Preferred blood glucose meter OR supplies to accompany: Blood Glucose Monitor Brands: per insurance. 1 kit 0    gabapentin (NEURONTIN) 300 MG capsule Take 2 capsules (600 mg) by mouth 3 times daily. 540 capsule 3    glucose 40 % (400 mg/mL) gel Take 15-30 g by mouth every 15 minutes as needed for low blood sugar. (Patient not taking: Reported on 5/13/2025) 37.5 mL 0    insulin glargine (LANTUS PEN) 100 UNIT/ML pen Inject 7 Units subcutaneously at bedtime. HOLD if Blood Glucose<120 3 mL 3    losartan (COZAAR) 25 MG tablet Take 1 tablet (25 mg) by mouth daily. HOLD if SBP<100 90 tablet 1    medical cannabis (Patient's own supply) See Admin Instructions (The purpose of this order is to document that the patient reports taking medical cannabis.  This is not a prescription, and is not used to certify that the patient has a qualifying medical condition.)   Flower -  PRN      metoprolol succinate ER (TOPROL XL) 50 MG 24 hr tablet Take 1 tablet (50 mg) by mouth daily. 90 tablet 3    multivitamin, therapeutic (THERA-VIT) TABS tablet Take 1 tablet by mouth daily. 30 tablet 0    ondansetron (ZOFRAN) 4 MG tablet TAKE 1 TABLET(4 MG) BY MOUTH TWICE DAILY AS NEEDED FOR NAUSEA 180 tablet 3    oxyCODONE IR (ROXICODONE) 10 MG tablet Take 1 tablet (10 mg) by mouth 5 x daily PRN for severe pain. #150 for one month supply 150 tablet 0    pantoprazole (PROTONIX) 40 MG EC tablet Take 1 tablet (40 mg) by mouth daily. 90 tablet 3    rosuvastatin (CRESTOR) 40 MG tablet TAKE 1 TABLET(40 MG) BY MOUTH DAILY 90 tablet 0    sertraline (ZOLOFT) 100 MG tablet TAKE 2 TABLETS BY MOUTH DAILY 60 tablet 0    thiamine (B-1) 100 MG tablet Take 1 tablet (100 mg) by mouth daily. 30 tablet 0    thin (NO BRAND SPECIFIED) lancets Use with lanceting device. To accompany: Blood Glucose Monitor Brands: per insurance. 100 each 2    triamterene-HCTZ (DYAZIDE) 37.5-25 MG capsule Take 1 capsule by mouth every morning. HOLD if SBP<100 90 capsule 0       Analgesic Medications:   Medications related to Pain Management (From now, onward)      None               LABORATORY VALUES:   Recent Labs   Lab Test 03/26/25  1747 03/26/25  1629 02/04/25  1052   NA  --  141 134*   POTASSIUM  --  3.2* 5.0   CHLORIDE  --  104 99   CO2  --  21* 23   ANIONGAP  --  16* 12   * 142* 130*   BUN  --  12.4 8.4   CR  --  0.52* 0.57*   [place]  --  10.0 9.3       CBC RESULTS:   Recent Labs   Lab Test 03/26/25  1629   WBC 9.3   RBC 5.24   HGB 14.5   HCT 42.9   MCV 82   MCH 27.7   MCHC 33.8   RDW 13.7          Most Recent 3 INR's:  Recent Labs   Lab Test 01/28/25  0315 01/22/25  0413 01/03/25  0336   INR 1.16* 1.37* 1.29*           ASSESSMENT:     Post amputation stump pain  Status post multiple dorsal column stimulator  Major depressive disorder  Generalized anxiety disorder  Status post CABG        Diagnoses           Codes Comments      Amputation stump pain (H)    -  Primary T87.89, M79.609               PLAN:     - Medications.      Continue medical cannabis  Baclofen 20 mg 3 times daily  Start gabapentin 300 mg 3 times daily        - Interventional procedures:     Follow-up nurse appointment for spinal cord dorsal column stimulator reprogramming.    Possible spinal cord stimulator revision in the future    - Labs and imaging: Ordered thoracolumbar spine x-ray to evaluate lead migration     - Rehab: None     - Psychology: No current needs.  He has been taking Zoloft      - Integrated medicine: None     - Disposition:  Clinic follow-up as needed.     Assessment will be ongoing with changes in treatment as indicated.  Benefits/risks/alternatives to treatment have been reviewed and the patient has been instructed to contact this office if they have any questions or concerns.  This plan of care has been discussed with the patient and the patient is in agreement.      Carlito Grimm MD, PHD

## 2025-07-02 NOTE — PATIENT INSTRUCTIONS
Imaging:    Xray of Thoracic/Lumbar Spine ordered.    IMAGING SERVICES HOURS:    All imaging modalities are available from 7 a.m. - 9 p.m. Monday through Friday  X-ray, CT, MRI, and General Ultrasound appointments are available from 7 a.m. -3:30 p.m. on Saturdays  X-ray, CT and MRI appointments are available from 8 a.m. - 4:30 p.m. on Sundays  Please call [phone] to schedule imaging exams       Recommended Follow up:      Schedule nurse visit with [facility] in clinic.       Please call [phone] to schedule your clinic appointment if you don't already have an appointment scheduled.        To speak with a nurse, schedule/reschedule/cancel a clinic appointment, or request a medication refill call: [phone]    You can also reach us by [employer]: [email or web address]

## 2025-07-02 NOTE — NURSING NOTE
Patient presents with:  Pain  Pain Management  RECHECK: Follow up-talk about suboxone      Mild Pain (3)     Pain Medications       Salicylates Refills Start End     aspirin (ASA) 81 MG chewable tablet 0 2/7/2025 --    Sig - Route: Take 1 tablet (81 mg) by mouth daily. - Oral    Class: E-Prescribe    Renewals       Renewal requests to authorizing provider (Allison Conley, APRN CNP) <b>prohibited</b>               Opioid Agonists Refills Start End     oxyCODONE IR (ROXICODONE) 10 MG tablet 0 6/13/2025 --    Sig - Route: Take 1 tablet (10 mg) by mouth 5 x daily PRN for severe pain. #150 for one month supply - Oral    Class: E-Prescribe    Earliest Fill Date: 6/13/2025            What medications are you using for pain? Oxycodone, gabapentin, baclofen (occasionally)    What refills are you needing today? none    Expectations: discuss suboxone    Viktoria Hernandez LPN

## 2025-07-03 ENCOUNTER — THERAPY VISIT (OUTPATIENT)
Dept: PHYSICAL THERAPY | Facility: CLINIC | Age: 65
End: 2025-07-03
Attending: STUDENT IN AN ORGANIZED HEALTH CARE EDUCATION/TRAINING PROGRAM
Payer: MEDICARE

## 2025-07-03 DIAGNOSIS — M62.81 GENERALIZED MUSCLE WEAKNESS: Primary | ICD-10-CM

## 2025-07-03 DIAGNOSIS — Z89.511 S/P BELOW KNEE AMPUTATION, RIGHT (H): ICD-10-CM

## 2025-07-03 PROCEDURE — 97530 THERAPEUTIC ACTIVITIES: CPT | Mod: GP | Performed by: PHYSICAL THERAPIST

## 2025-07-03 PROCEDURE — 97110 THERAPEUTIC EXERCISES: CPT | Mod: GP | Performed by: PHYSICAL THERAPIST

## 2025-07-05 ENCOUNTER — HEALTH MAINTENANCE LETTER (OUTPATIENT)
Age: 65
End: 2025-07-05

## 2025-07-07 ENCOUNTER — MYC MEDICAL ADVICE (OUTPATIENT)
Dept: CARDIOLOGY | Facility: CLINIC | Age: 65
End: 2025-07-07

## 2025-07-07 ENCOUNTER — MYC MEDICAL ADVICE (OUTPATIENT)
Dept: ORTHOPEDICS | Facility: CLINIC | Age: 65
End: 2025-07-07

## 2025-07-07 NOTE — TELEPHONE ENCOUNTER
Patient confirmed scheduled appointment:  Date: 7/21/2025  Time: 11:20am  Visit type: NEW HAND/WRIST  Provider:   [place] [facility] [place]

## 2025-07-08 ENCOUNTER — MYC REFILL (OUTPATIENT)
Dept: FAMILY MEDICINE | Facility: CLINIC | Age: 65
End: 2025-07-08
Payer: MEDICARE

## 2025-07-08 DIAGNOSIS — G89.29 CHRONIC PAIN OF RIGHT ANKLE: ICD-10-CM

## 2025-07-08 DIAGNOSIS — M79.671 INTRACTABLE RIGHT HEEL PAIN: ICD-10-CM

## 2025-07-08 DIAGNOSIS — R63.4 UNINTENTIONAL WEIGHT LOSS: ICD-10-CM

## 2025-07-08 DIAGNOSIS — M21.6X1 ACQUIRED CALCANEUS DEFORMITY OF RIGHT ANKLE: ICD-10-CM

## 2025-07-08 DIAGNOSIS — Z98.890 H/O FOOT SURGERY: ICD-10-CM

## 2025-07-08 DIAGNOSIS — G89.4 CHRONIC PAIN SYNDROME: ICD-10-CM

## 2025-07-08 DIAGNOSIS — F11.90 CHRONIC, CONTINUOUS USE OF OPIOIDS: ICD-10-CM

## 2025-07-08 DIAGNOSIS — S86.011A RUPTURE OF RIGHT ACHILLES TENDON, INITIAL ENCOUNTER: ICD-10-CM

## 2025-07-08 DIAGNOSIS — M76.60 CALCIFIC ACHILLES TENDINITIS: ICD-10-CM

## 2025-07-08 DIAGNOSIS — F11.20 CONTINUOUS OPIOID DEPENDENCE (H): ICD-10-CM

## 2025-07-08 DIAGNOSIS — M25.571 CHRONIC PAIN OF RIGHT ANKLE: ICD-10-CM

## 2025-07-08 NOTE — TELEPHONE ENCOUNTER
msg sent to pt with provider comments. aa  --------------------------------------------------------------  Msg received:  From: Dawit Fox MD  Sent: 7/8/2025  12:44 PM CDT  To: Calli Nassar RN  Subject: FW: Low blood pressure                           Should go down to 25 mg of losartan.  He needs to take Coreg with food

## 2025-07-08 NOTE — TELEPHONE ENCOUNTER
"Dr. Fox -- please see pt  msg and chart review below. Recs? Thanks. aa  -----------------------------------------------------------------------  Per chart review, changes that were made at [facility] during admission on 6/17:     \"He was noted to have significant troponin rise in ED from 23-61. EKG without ischemic changes. He was treated for possible NSTEMI with IV heparin. Echocardiogram performed which showed LVEF 40 to 45%. Nuclear medicine stress test shows inferior scar, no ischemia with LVEF about 45%. Cardiology recommended medical management of his heart failure with mildly reduced ejection fraction with goal-directed medical therapy. Metoprolol discontinued and carvedilol started. Losartan increased. Hydrochlorothiazide stopped. Patient started on empagliflozin at 10 mg daily.\"    Losartan increased from 25mg daily to 50mg daily  Metoprolol discontinued and pt switched to Coreg 12.5mg BID  "

## 2025-07-09 RX ORDER — OXYCODONE HYDROCHLORIDE 10 MG/1
10 TABLET ORAL
Qty: 150 TABLET | Refills: 0 | Status: SHIPPED | OUTPATIENT
Start: 2025-07-09

## 2025-07-10 ENCOUNTER — LAB (OUTPATIENT)
Dept: LAB | Facility: CLINIC | Age: 65
End: 2025-07-10
Payer: MEDICARE

## 2025-07-10 ENCOUNTER — MYC MEDICAL ADVICE (OUTPATIENT)
Dept: FAMILY MEDICINE | Facility: CLINIC | Age: 65
End: 2025-07-10

## 2025-07-10 ENCOUNTER — ANCILLARY PROCEDURE (OUTPATIENT)
Dept: GENERAL RADIOLOGY | Facility: CLINIC | Age: 65
End: 2025-07-10
Attending: ANESTHESIOLOGY
Payer: MEDICARE

## 2025-07-10 DIAGNOSIS — E11.42 TYPE 2 DIABETES MELLITUS WITH DIABETIC POLYNEUROPATHY, WITH LONG-TERM CURRENT USE OF INSULIN (H): ICD-10-CM

## 2025-07-10 DIAGNOSIS — Z12.5 SCREENING FOR PROSTATE CANCER: ICD-10-CM

## 2025-07-10 DIAGNOSIS — Z96.89 SPINAL CORD STIMULATOR STATUS: ICD-10-CM

## 2025-07-10 DIAGNOSIS — Z79.4 TYPE 2 DIABETES MELLITUS WITH DIABETIC POLYNEUROPATHY, WITH LONG-TERM CURRENT USE OF INSULIN (H): ICD-10-CM

## 2025-07-10 DIAGNOSIS — E78.5 HYPERLIPIDEMIA LDL GOAL <100: ICD-10-CM

## 2025-07-10 DIAGNOSIS — G90.529 COMPLEX REGIONAL PAIN SYNDROME TYPE 1 OF LOWER EXTREMITY, UNSPECIFIED LATERALITY: ICD-10-CM

## 2025-07-10 DIAGNOSIS — R63.4 UNINTENTIONAL WEIGHT LOSS: ICD-10-CM

## 2025-07-10 DIAGNOSIS — G89.4 CHRONIC PAIN SYNDROME: Primary | ICD-10-CM

## 2025-07-10 DIAGNOSIS — R79.89 ABNORMAL LIVER FUNCTION TESTS: ICD-10-CM

## 2025-07-10 DIAGNOSIS — F11.20 CONTINUOUS OPIOID DEPENDENCE (H): ICD-10-CM

## 2025-07-10 LAB
CHOLEST SERPL-MCNC: 118 MG/DL
CREAT UR-MCNC: 69.8 MG/DL
EST. AVERAGE GLUCOSE BLD GHB EST-MCNC: 117 MG/DL
FASTING STATUS PATIENT QL REPORTED: NORMAL
HBA1C MFR BLD: 5.7 % (ref 0–5.6)
HDLC SERPL-MCNC: 48 MG/DL
LDLC SERPL CALC-MCNC: 56 MG/DL
MICROALBUMIN UR-MCNC: <12 MG/L
MICROALBUMIN/CREAT UR: NORMAL MG/G{CREAT}
NONHDLC SERPL-MCNC: 70 MG/DL
PSA SERPL DL<=0.01 NG/ML-MCNC: 0.13 NG/ML (ref 0–4.5)
T4 FREE SERPL-MCNC: 1.36 NG/DL (ref 0.9–1.7)
TRIGL SERPL-MCNC: 70 MG/DL
TSH SERPL DL<=0.005 MIU/L-ACNC: 0.26 UIU/ML (ref 0.3–4.2)

## 2025-07-10 PROCEDURE — 72080 X-RAY EXAM THORACOLMB 2/> VW: CPT | Mod: TC | Performed by: RADIOLOGY

## 2025-07-14 ENCOUNTER — MYC MEDICAL ADVICE (OUTPATIENT)
Dept: CARDIOLOGY | Facility: CLINIC | Age: 65
End: 2025-07-14
Payer: MEDICARE

## 2025-07-14 DIAGNOSIS — Z95.1 S/P CABG X 4: ICD-10-CM

## 2025-07-15 RX ORDER — LOSARTAN POTASSIUM 25 MG/1
25 TABLET ORAL DAILY
Qty: 90 TABLET | Refills: 1 | Status: SHIPPED | OUTPATIENT
Start: 2025-07-15

## 2025-07-15 RX ORDER — CARVEDILOL 12.5 MG/1
12.5 TABLET ORAL 2 TIMES DAILY WITH MEALS
Qty: 180 TABLET | Refills: 1 | Status: SHIPPED | OUTPATIENT
Start: 2025-07-15

## 2025-07-15 NOTE — TELEPHONE ENCOUNTER
Refill of drugs prescribed by cardiologist in the hospital  (Newest Message First)             Dawit Fox MD to Me (Selected Message)        7/14/25  6:54 PM  yes  Me to Dawit Fox MD        7/14/25  2:54 PM  Looks like Jardiance and Coreg were recent med changes from ER/hospitalization out of system. Do you authorize them being refilled under you?  Thanks,  Mal           --refills sent. -[facility] [place]

## 2025-07-16 ENCOUNTER — E-CONSULT (OUTPATIENT)
Dept: ADDICTION MEDICINE | Facility: CLINIC | Age: 65
End: 2025-07-16
Payer: MEDICARE

## 2025-07-16 PROCEDURE — 99451 NTRPROF PH1/NTRNET/EHR 5/>: CPT | Performed by: FAMILY MEDICINE

## 2025-07-16 NOTE — PROGRESS NOTES
7/16/2025     E-Consult has been accepted.    Interprofessional consultation requested by:  Wegener, Joel Daniel Irwin, MD      Clinical Question/Purpose: MY CLINICAL QUESTION IS: Mr. Aguilar would prefer transitioning from oxycodone to suboxone to increase medication safety.  He has a history of chronic pain, likely opiod dependence based on current dosing but neither himself nor myself feel that he has opiod use disorder.     Patient assessment and information reviewed: Pain Clinic progress note July 2, 2025, MyChart encounter with Dr. Wegener on July 9, 2025. PDMP today 7/16/25.    Recommendations:   Review of the chart reveals impressions from primary care and pain management supporting a transition to suboxone management. Has escalated oxycodone dose from 30mg total daily dose (TDD) to 50mg TDD over the past several months without appropriate efficacy. Long history of oxycodone management, with records indicating consistent fills of oxycodone 30mg TDD for the past year until dose was escalated in May. No indication in chart notes of opioid use disorder. Unable to find substantial progress notes supporting features of opioid use disorder, so I will proceed with a presumptive agreement with Dr. Wegener's assessment that patient does not have opioid use disorder (OUD).    Transition to buprenorphine products from oxycodone is a very reasonable method for reducing possible harm and overdose/misuse risk. This has been suggested for patients on chronic opioid management who are either not getting appropriate efficacy from their regimen, or for those who have attempted taper with poor results in the absence of an OUD diagnosis.    Buprenorphine (brand name Subutex) is available as a tablet without naloxone, which is more often used for the treatment of chronic pain. This is often prescribed in lieu of Suboxone (buprenorphine-naloxone), which was specifically released by the FDA for treatment of OUD. However, both  medications should be well-tolerated and effective and we often will transition patients between these two medications with equivalent buprenorphine doses. Unfortunately, insurance coverage ultimately decides what is covered, so I am simply wanting to make a note that ANY buprenorphine product is acceptable for the purposes of pain control for this patient, and all dosing is referring to the buprenorphine component.    Oxycodone 50mg TDD has an MME of 75mg. Buprenorphine has a range of reported MME, but 30mg is often used for dose conversions with reasonable success. Therefore, a dose of 2.5mg TDD would be an appropriate conversion. For pain management, buprenorphine is often prescribed in multiple doses during the day, as it does have a peak analgesic effect over the first 2-3 hours of effectiveness. The long half-life of this medication often allows for effects to improve as the medication is steadily taken during the first weeks of management.    Practically speaking, using a 2mg buprenorphine product and starting with 1/2 tablet (1mg) three times daily for a total of 3mg TDD would be the lowest starting dose I would recommend. Considering the patient has recently increased his oxycodone dose requirements, it would be reasonable to titrate to effect once medication levels have been well-established after 1-2 weeks.    To start buprenorphine, it is best to wait 12 hours after last dose of oxycodone, followed by the first dose of sublingual buprenorphine, to avoid precipitated withdrawal. Alternatively, if withdrawal occurs prior to this 12 hours and COWS score >8 is found, it would be safe for medication to be started. Subjectively, the patient can reasonably be approximated to a COWS >8 if they are experiencing 3 objective signs of withdrawal (anxiety is not considered).    Please feel free to write back with any clarifying questions, thanks for your consultation.      The recommendations provided in this  E-Consult are based on a review of clinical data pertinent to the clinical question presented, without a review of the patient's complete medical record or, the benefit of a comprehensive in-person or virtual patient evaluation. This consultation should not replace the clinical judgement and evaluation of the provider ordering this E-Consult. Any new clinical issues, or changes in patient status since the filing of this E-Consult will need to be taken into account when assessing these recommendations. Please contact me if you have further questions.    My total time spent reviewing clinical information and formulating assessment was 20 minutes.        Reddy Mascorro MD

## 2025-07-17 ENCOUNTER — VIRTUAL VISIT (OUTPATIENT)
Dept: PHYSICAL THERAPY | Facility: CLINIC | Age: 65
End: 2025-07-17
Attending: STUDENT IN AN ORGANIZED HEALTH CARE EDUCATION/TRAINING PROGRAM
Payer: MEDICARE

## 2025-07-17 DIAGNOSIS — Z89.511 S/P BELOW KNEE AMPUTATION, RIGHT (H): ICD-10-CM

## 2025-07-17 DIAGNOSIS — M62.81 GENERALIZED MUSCLE WEAKNESS: Primary | ICD-10-CM

## 2025-07-17 PROCEDURE — 97530 THERAPEUTIC ACTIVITIES: CPT | Mod: GP,GT,95

## 2025-07-21 ENCOUNTER — OFFICE VISIT (OUTPATIENT)
Dept: ORTHOPEDICS | Facility: CLINIC | Age: 65
End: 2025-07-21
Payer: MEDICARE

## 2025-07-21 VITALS — WEIGHT: 148 LBS | BODY MASS INDEX: 20.72 KG/M2 | HEIGHT: 71 IN

## 2025-07-21 DIAGNOSIS — R29.898 BILATERAL ARM WEAKNESS: Primary | ICD-10-CM

## 2025-07-21 DIAGNOSIS — M70.21 OLECRANON BURSITIS, RIGHT ELBOW: ICD-10-CM

## 2025-07-21 DIAGNOSIS — M54.12 CERVICAL RADICULOPATHY: ICD-10-CM

## 2025-07-21 RX ORDER — LIDOCAINE HYDROCHLORIDE 10 MG/ML
5 INJECTION, SOLUTION EPIDURAL; INFILTRATION; INTRACAUDAL; PERINEURAL
Status: COMPLETED | OUTPATIENT
Start: 2025-07-21 | End: 2025-07-21

## 2025-07-21 RX ADMIN — LIDOCAINE HYDROCHLORIDE 5 ML: 10 INJECTION, SOLUTION EPIDURAL; INFILTRATION; INTRACAUDAL; PERINEURAL at 11:42

## 2025-07-21 NOTE — NURSING NOTE
"Reason For Visit:   Chief Complaint   Patient presents with    Consult     Right Elbow Olecranon bursitis       Primary MD: Wegener, Joel Daniel Irwin  Ref. MD: Nisha    Age: 64 year old    ?  No      Ht 1.803 m (5' 11\")   Wt 67.1 kg (148 lb)   BMI 20.64 kg/m        Pain Assessment  Patient Currently in Pain: Yes  Patient's Stated Pain Goal: No pain  Pain Descriptors: Numbness    Hand Dominance Evaluation  Hand Dominance: Right          QuickDASH Assessment      11/5/2023     5:34 PM   QuickDASH Main   1. Open a tight or new jar Severe difficulty   2. Do heavy household chores (e.g., wash walls, floors) Severe difficulty   3. Carry a shopping bag or briefcase Severe difficulty   4. Wash your back Unable   5. Use a knife to cut food Moderate difficulty   6. Recreational activities in which you take some force or impact through your arm, shoulder or hand (e.g., golf, hammering, tennis, etc.) Unable to answer   7. During the past week, to what extent has your arm, shoulder or hand problem interfered with your normal social activities with family, friends, neighbours or groups Moderately   8. During the past week, were you limited in your work or other regular daily activities as a result of your arm, shoulder or hand problem Moderately limited   9. Arm, shoulder or hand pain Severe   10.Tingling (pins and needles) in your arm,shoulder or hand Mild   11. During the past week, how much difficulty have you had sleeping because of the pain in your arm, shoulder or hand Moderate difficulty   Quickdash Ability Score 54.55          Current Outpatient Medications   Medication Sig Dispense Refill    albuterol (PROAIR HFA/PROVENTIL HFA/VENTOLIN HFA) 108 (90 Base) MCG/ACT inhaler INHALE 2 PUFFS BY MOUTH EVERY 6 HOURS AS NEEDED FOR SHORTNESS OF BREATH OR WHEEZING 18 g 11    aspirin (ASA) 81 MG chewable tablet Take 1 tablet (81 mg) by mouth daily. 30 tablet 0    baclofen (LIORESAL) 10 MG tablet Take 1 tablet (10 mg) by " mouth 3 times daily as needed for muscle spasms. 20 tablet 0    blood glucose (NO BRAND SPECIFIED) test strip Use to test blood sugar 3 times daily or as directed. To accompany: Blood Glucose Monitor Brands: per insurance. 100 strip 2    blood glucose monitoring (NO BRAND SPECIFIED) meter device kit Use to test blood sugar 3 times daily or as directed. Preferred blood glucose meter OR supplies to accompany: Blood Glucose Monitor Brands: per insurance. 1 kit 0    carvedilol (COREG) 12.5 MG tablet Take 1 tablet (12.5 mg) by mouth 2 times daily (with meals). 180 tablet 1    empagliflozin (JARDIANCE) 10 MG TABS tablet Take 1 tablet (10 mg) by mouth daily. 90 tablet 1    gabapentin (NEURONTIN) 300 MG capsule Take 2 capsules (600 mg) by mouth 3 times daily. 540 capsule 3    glucose 40 % (400 mg/mL) gel Take 15-30 g by mouth every 15 minutes as needed for low blood sugar. (Patient not taking: Reported on 7/2/2025) 37.5 mL 0    insulin glargine (LANTUS PEN) 100 UNIT/ML pen Inject 7 Units subcutaneously at bedtime. HOLD if Blood Glucose<120 3 mL 3    losartan (COZAAR) 25 MG tablet Take 1 tablet (25 mg) by mouth daily. HOLD if SBP<100 90 tablet 1    medical cannabis (Patient's own supply) See Admin Instructions (The purpose of this order is to document that the patient reports taking medical cannabis.  This is not a prescription, and is not used to certify that the patient has a qualifying medical condition.)   Flower - PRN      multivitamin, therapeutic (THERA-VIT) TABS tablet Take 1 tablet by mouth daily. 30 tablet 0    ondansetron (ZOFRAN) 4 MG tablet TAKE 1 TABLET(4 MG) BY MOUTH TWICE DAILY AS NEEDED FOR NAUSEA 180 tablet 3    oxyCODONE IR (ROXICODONE) 10 MG tablet Take 1 tablet (10 mg) by mouth 5 x daily PRN for severe pain. #150 for one month supply 150 tablet 0    pantoprazole (PROTONIX) 40 MG EC tablet Take 1 tablet (40 mg) by mouth daily. 90 tablet 3    rosuvastatin (CRESTOR) 40 MG tablet TAKE 1 TABLET(40 MG) BY  MOUTH DAILY 90 tablet 0    sertraline (ZOLOFT) 100 MG tablet TAKE 2 TABLETS BY MOUTH DAILY 60 tablet 0    thiamine (B-1) 100 MG tablet Take 1 tablet (100 mg) by mouth daily. 30 tablet 0    thin (NO BRAND SPECIFIED) lancets Use with lanceting device. To accompany: Blood Glucose Monitor Brands: per insurance. 100 each 2    triamterene-HCTZ (DYAZIDE) 37.5-25 MG capsule Take 1 capsule by mouth every morning. HOLD if SBP<100 90 capsule 0       Allergies   Allergen Reactions    Levaquin Anaphylaxis and Rash     Swelling in lip and tongue felt thick    Lisinopril Anaphylaxis     Swollen tongue; inability to swallow; drooling; hives; swollen face, neck, angioedema    Acetaminophen      Hx of cirrhosis     Amlodipine      Swelling, hives, possible angioedema      Morphine      b/p dropped and arms went numb  Hypotension    Quinolones Hives    Spironolactone Unknown     Pt believes himself to be allergic to it; cannot find his old records of this.-mjc     Bactrim [Sulfamethoxazole-Trimethoprim] Rash       LUANA CHANDLER, ATC

## 2025-07-21 NOTE — PROGRESS NOTES
"Orthopaedic Surgery Hand Clinic Progress Note    Patient Name: Erwin Aguilar  MRN: [id number]  : 1960  Date: 2025    Date of Surgery: 23    Surgery Performed:   1) Left in-situ cubital tunnel release  2) Left open carpal tunnel release  3) Left open trigger thumb release       Subjective:  Patient returns today with new issue with the right elbow.  Patient has had olecranon bursitis with fullness and swelling over the posterior elbow for about 3 to 4 months.  It is not painful, but does bother him as it is quite noticeable.  Patient also endorses some numbness and tingling in the dorsal radial sensory nerve distribution at the top of the hand and wonders if this mass is compressing it.  Since I last saw him 2 years ago, he has had a number of health issues, including MI requiring open heart surgery as well as a right lower leg amputation.  Since then, he has had weakness of proximal shoulders with difficulty with overhead tasks.    Objective:  Ht 1.803 m (5' 11\")   Wt 67.1 kg (148 lb)   BMI 20.64 kg/m      General: alert, appropriate, NAD  HEENT: NC/AT  CV: RRR by pulse  Pulm: Unlabored on RA  MSK:  Fullness over the posterior elbow consistent with olecranon bursitis, no evidence of infection  Healed medial elbow incision from 40 years ago  Nontender to palpation over the elbow  Full elbow range of motion  Negative Tinel's over the LABC or dorsal radial sensory nerve  Mildly diminished sensation to light touch in the index finger compared to the small finger diminished sensation light touch in the first webspace  Negative Tinel's/Durkan's compression test at the carpal tunnel  Intact EPL, FPL, intrinsics  WWP, cap refill less than 2 seconds      Imaging:  None new    Assessment/Plan:  64-year-old male with right olecranon bursitis    I discussed diagnosis, prognosis, and treatment options with the patient.  I advised that the majority of these will resolve spontaneously.  We discussed " continued observation, aspiration, or bursectomy.  Patient would like to try aspiration today.  This was performed in clinic. Skin was anesthetized with 2cc 1% lidocaine and 18 gauge needled used to aspirate 17cc of serosanguinous fluid from the bursa which fully decompressed. See procedure note. Instructed to apply compression for 1 week.  I advised that surgery would be risky for him given his number of comorbidities at this time. He agrees.    We discussed possible causes of the numbness and tingling, including cervical radiculopathy versus peripheral entrapment of the radial nerve.  I reassured him it is not caused by olecranon bursitis.  We discussed EMG/NCS to further evaluate. He wished to obtain this give his upper extremity weakness.    The patient voiced understanding and agreement.  All questions answered. Follow-up after EMG.    Deny Dixon MD    Hand & Upper Extremity Surgery  Department of Orthopedic Surgery  [facility]

## 2025-07-21 NOTE — PROGRESS NOTES
Hand / Upper Extremity Injection/Arthrocentesis    Date/Time: 2025 11:42 AM    Performed by: Deny Dixon MD  Authorized by: Deny Dixon MD    Indications:  Pain  Needle Size:  25 G  Guidance: landmark     Condition comment:  Right Elbow Olecranon bursitis    Medications:  5 mL lidocaine (PF) 1 %  Outcome:  Tolerated well, no immediate complications  Procedure discussed: discussed risks, benefits, and alternatives    Consent Given by:  Patient  Timeout: timeout called immediately prior to procedure    Prep: patient was prepped and draped in usual sterile fashion        [facility] [address] [address] [phone]  Dept: [phone]  ______________________________________________________________________________    Patient: Erwin Aguilar   : 1960   MRN: [id number]   2025    INVASIVE PROCEDURE SAFETY CHECKLIST    Date: 2025   Procedure:Right Elbow olecranon bursa aspiration with Lidocaine 1%Lidocaine injection for Pain block  Patient Name: Erwin Aguilar  MRN: [id number]  YOB: 1960    Action: Complete sections as appropriate. Any discrepancy results in a HARD COPY until resolved.     PRE PROCEDURE:  Patient ID verified with 2 identifiers (name and  or MRN): Yes  Procedure and site verified with patient/designee (when able): Yes  Accurate consent documentation in medical record: Yes  H&P (or appropriate assessment) documented in medical record: Yes  H&P must be up to 20 days prior to procedure and updates within 24 hours of procedure as applicable: Yes  Relevant diagnostic and radiology test results appropriately labeled and displayed as applicable: NA  Procedure site(s) marked with provider initials: NA    TIMEOUT:  Time-Out performed immediately prior to starting procedure, including verbal and active participation of all team members addressing the following:Yes  * Correct patient identify  *  Confirmed that the correct side and site are marked  * An accurate procedure consent form  * Agreement on the procedure to be done  * Correct patient position  * Relevant images and results are properly labeled and appropriately displayed  * The need to administer antibiotics or fluids for irrigation purposes during the procedure as applicable   * Safety precautions based on patient history or medication use    DURING PROCEDURE: Verification of correct person, site, and procedures any time the responsibility for care of the patient is transferred to another member of the care team.       The following medications were given:         Prior to injection, verified patient identity using patient's name and date of birth.  Due to injection administration, patient instructed to remain in clinic for 15 minutes  afterwards, and to report any adverse reaction to me immediately.    Lidocaine 1%Lidocaine injection for Pain block      Medication Name Lidocaine 1% NDC 26103-703-15    Scribed by LUANA CHANDLER, ATC for Dr. Dixon on July 21, 2025 at 11:45am based on the provider's statements to me.     LUANA CHANDLER, ATC  \F2

## 2025-07-23 ENCOUNTER — MYC MEDICAL ADVICE (OUTPATIENT)
Dept: CARDIOLOGY | Facility: CLINIC | Age: 65
End: 2025-07-23
Payer: MEDICARE

## 2025-07-24 ENCOUNTER — HOSPITAL ENCOUNTER (EMERGENCY)
Facility: CLINIC | Age: 65
Discharge: HOME | End: 2025-07-24
Attending: EMERGENCY MEDICINE | Admitting: EMERGENCY MEDICINE
Payer: MEDICARE

## 2025-07-24 VITALS
OXYGEN SATURATION: 97 % | HEART RATE: 70 BPM | TEMPERATURE: 98.4 F | RESPIRATION RATE: 18 BRPM | DIASTOLIC BLOOD PRESSURE: 80 MMHG | SYSTOLIC BLOOD PRESSURE: 138 MMHG

## 2025-07-24 DIAGNOSIS — S81.812A SKIN TEAR OF LEFT LOWER LEG WITHOUT COMPLICATION, INITIAL ENCOUNTER: Primary | ICD-10-CM

## 2025-07-24 PROCEDURE — 99282 EMERGENCY DEPT VISIT SF MDM: CPT | Performed by: EMERGENCY MEDICINE

## 2025-07-24 PROCEDURE — 99283 EMERGENCY DEPT VISIT LOW MDM: CPT | Mod: GC | Performed by: EMERGENCY MEDICINE

## 2025-07-24 ASSESSMENT — ACTIVITIES OF DAILY LIVING (ADL)
ADLS_ACUITY_SCORE: 58

## 2025-07-24 NOTE — DISCHARGE INSTRUCTIONS
You were seen in the ED for a skin tear. At this time there is no concern for infection or other complications related to the tear. Continue to care for your tear with wraps as you have been and topical antibiotics as needed. Return to the ED if you develop new fever/chills, leg pain, swelling, or redness

## 2025-07-24 NOTE — ED TRIAGE NOTES
To triage for wound to LLE   Wound is wrapped and bleeding controlled  Severe PAD, vascular issues   Triage Assessment (Adult)       Row Name 07/24/25 1044          Triage Assessment    Airway WDL WDL        Respiratory WDL    Respiratory WDL WDL        Skin Circulation/Temperature WDL    Skin Circulation/Temperature WDL WDL        Cardiac WDL    Cardiac WDL WDL        Peripheral/Neurovascular WDL    Peripheral Neurovascular WDL WDL        Cognitive/Neuro/Behavioral WDL    Cognitive/Neuro/Behavioral WDL WDL

## 2025-07-24 NOTE — ED PROVIDER NOTES
ED Provider Note  [facility]      History     Chief Complaint   Patient presents with    Wound Check     HPI  Erwin Aguilar is a 64 year old male with a history of PAD s/p bilateral lower extremity angioplasty and right BKA, paroxysmal atrial fibrillation, insulin-dependent diabetes, cirrhosis, cauda equina syndrome, failed back surgery syndrome s/p spinal cord stimulator, STEMI and refractory VT arrest (12/31/2024) requiring ECMO who presents to the emergency department for a wound check on his left lower extremity.  He cut his left leg last night while scratching.  The wound immediately began to bleed, and bleeding was not controlled until the wound was wrapped with pads and gauze.  He has had no pain following the cut.  Since last night he has had no fevers or chills.  Denies any discharge, edema, erythema, or tenderness of his leg or the wound. Pt is not currently concerned about infection, but is concerned about the bleeding given his history of PAD and angioplasty.      Past Medical History  Past Medical History:   Diagnosis Date    Actinic keratosis     aldara    Anxiety 12/1997    Cancer (H)     squamous cell skin CA    Cauda equina spinal cord injury (H)     Chronic sinusitis 05/01/2016    Cirrhosis of liver (H) 04/01/2014    Not biopsy-proven as of 4/1/14.      Depressive disorder     Diastasis recti     Esophageal reflux     Esophageal varices in cirrhosis (H) 04/01/2014    Hospitalized for UGI blee 3/28/14, endoscopy revealed bleeding varices.    Essential hypertension, benign     Intermittent asthma     Mild depression     Mixed hyperlipidemia     Nasal polyps 05/01/2016    Nausea vomiting and diarrhea 1/28/2025    Osteoarthritis of lumbar spine, unspecified spinal osteoarthritis complication status     Other chronic pain     Paroxysmal atrial fibrillation (H) 09/22/2021    SCCA (squamous cell carcinoma) of skin     Seasonal allergic conjunctivitis     Type II or unspecified  type diabetes mellitus without mention of complication, not stated as uncontrolled     Unspecified site of spinal cord injury without evidence of spinal bone injury     due to back surgery     Past Surgical History:   Procedure Laterality Date    ABDOMEN SURGERY  2014    AMPUTATE LEG BELOW KNEE Right 05/09/2024    Procedure: Right below knee amputation (transtibial amputation);  Surgeon: Kurtis Nye MD;  Location: [facility] OR    ANGIOGRAM Right 04/23/2024    Procedure: Ultrasound guided percutaneous transarterial left common femoral artery access, diagnostic aortoiliac angiogram, selective cannulation of right comon iliac, righ external iliac, right common femoral, and right superficial femoral artery, balloon angioplasty of right superficial femoral artery, 6mm x 12 cm self-expanding drug-coated stent placement of right superficial femoral artery, lef    BACK SURGERY  08/2009    BYPASS GRAFT ARTERY CORONARY N/A 01/02/2025    Procedure: Median Sternotomy, on Cardiopulmonary Bypass Pump, Left Internal Mammary Artery [place], Endoscopic [place] of Left Greater Saphenous Vein, Coronary Artery Bypass Graft x 4, Left Atrial Appendage Ligation, Left Atrial Ablation, and Intraoperative Transesophageal Echocardiogram By Anesthesia;  Surgeon: Bernice Rosenbaum MD;  [facility]: [facility] OR    COLONOSCOPY N/A 05/12/2016    Procedure: COMBINED COLONOSCOPY, SINGLE OR MULTIPLE BIOPSY/POLYPECTOMY BY BIOPSY;  Surgeon: Ana Paula Urbina MD;  [facility]: [facility] GI    CV ARTERIAL LINE PLACEMENT N/A 12/31/2024    Procedure: Arterial Line Placement;  Surgeon: Sharif Tamez MD;  Location:  HEART CARDIAC CATH LAB    CV CENTRAL VENOUS CATHETER PLACEMENT N/A 12/31/2024    Procedure: Central Venous Catheter Placement;  Surgeon: Sharif Tamez MD;  Location:  HEART CARDIAC CATH LAB    CV CORONARY ANGIOGRAM N/A 12/31/2024    Procedure: Coronary Angiogram;  Surgeon: Sharif Tamez MD;  Location:  HEART CARDIAC CATH LAB    CV  EXTRACORPERAL MEMBRANE OXYGENATION N/A 12/31/2024    Procedure: Extracorporeal Membrance Oxygenation;  Surgeon: Sharif Tamez MD;  Location:  HEART CARDIAC CATH LAB    DECOMPRESSION CUBITAL TUNNEL Left 08/31/2023    Procedure: LEFT CUBITAL TUNNEL RELEASE,;  Surgeon: Deny Dixon MD;  Location: [facility] OR    ENDOSCOPY UPPER, COLONOSCOPY, COMBINED  10/19/2011    Procedure:COMBINED ENDOSCOPY UPPER, COLONOSCOPY; Upper Endoscopy, Colonoscopy with Polypectomy x4; Surgeon:AMBAR RODRÍGUEZIQ; Location: OR    ENT SURGERY  01/2016    Ongoing since then    ESOPHAGOSCOPY, GASTROSCOPY, DUODENOSCOPY (EGD), COMBINED  03/28/2014    Procedure: COMBINED ESOPHAGOSCOPY, GASTROSCOPY, DUODENOSCOPY (EGD);  EGD, Gastric Biopsies, Esophageal Banding;  Surgeon: Reyna Tovar MD;  Location:  OR    ESOPHAGOSCOPY, GASTROSCOPY, DUODENOSCOPY (EGD), COMBINED  06/09/2014    Procedure: COMBINED ESOPHAGOSCOPY, GASTROSCOPY, DUODENOSCOPY (EGD);  Surgeon: Curtis Mendez MD;  Location:  GI    ESOPHAGOSCOPY, GASTROSCOPY, DUODENOSCOPY (EGD), COMBINED  07/24/2014    Procedure: COMBINED ESOPHAGOSCOPY, GASTROSCOPY, DUODENOSCOPY (EGD);  Surgeon: Gerard Samaniego MD;  Location: [facility]    ESOPHAGOSCOPY, GASTROSCOPY, DUODENOSCOPY (EGD), COMBINED N/A 10/31/2014    Procedure: COMBINED ESOPHAGOSCOPY, GASTROSCOPY, DUODENOSCOPY (EGD);  Surgeon: Gerard Samaniego MD;  Location:  OR    ESOPHAGOSCOPY, GASTROSCOPY, DUODENOSCOPY (EGD), COMBINED N/A 05/12/2016    Procedure: COMBINED ESOPHAGOSCOPY, GASTROSCOPY, DUODENOSCOPY (EGD);  Surgeon: Ana Paula Urbina MD;  Location:  GI    ESOPHAGOSCOPY, GASTROSCOPY, DUODENOSCOPY (EGD), COMBINED N/A 08/02/2018    Procedure: COMBINED ESOPHAGOSCOPY, GASTROSCOPY, DUODENOSCOPY (EGD);  EGD;  Surgeon: Yu Wagner MD;  Location:  GI    HCL SQUAMOUS CELL CARCINOMA AG  10/2007    left forearm    HERNIORRHAPHY UMBILICAL  11/08/2012    Procedure: HERNIORRHAPHY UMBILICAL;  Open Umbilical  Hernia Repair With Mesh ;  Surgeon: Chase Nicholson MD;  [facility]: [facility] OR    INSERT STIMULATOR DORSAL COLUMN N/A 06/28/2018    Procedure: INSERT STIMULATOR DORSAL COLUMN;;  Surgeon: Elvis Sauceda MD;  [facility] OR    IR FLUORO 0-1 HOUR  1/23/2025    IR LOWER EXTREMITY ANGIOGRAM RIGHT  04/23/2024    neuro stimulator  2010    PICC DOUBLE LUMEN PLACEMENT Left 01/16/2025    Lateral brachial, 47 cm, 3 cm external length    RELEASE CARPAL TUNNEL Left 08/31/2023    Procedure: LEFT OPEN CARPAL TUNNEL RELEASE,;  Surgeon: Deny Dixon MD;  Location: [facility] OR    RELEASE TRIGGER FINGER Left 08/31/2023    Procedure: Release left trigger finger thumb;  Surgeon: Deny Dixon MD;  Location: [facility] OR    REMOVE EXTRACORPORAL MEMBRANE OXYGENATOR ADULT N/A 01/03/2025    Procedure: Remove Left Leg Peripheral Extracorporal Membrane Oxygenator and Closure;  Surgeon: Bernice Rosenbaum MD;  [facility]: [facility]    REMOVE GENERATOR STIMULATOR (LOCATION) N/A 06/28/2018    Procedure: REMOVE GENERATOR STIMULATOR (LOCATION);  Spinal Cord Stimulator and IPG Explant and Re-Implant of SCS System (Leads and IPG);  Surgeon: Elvis Sauceda MD;  [facility]    REPAIR TENDON ACHILLES Right 11/11/2020    Procedure: Right achilles debridement and partial calcaneus excision;  Surgeon: Eh Sandoval MD;  Location: [facility] OR    SURGICAL HISTORY OF -   01/2002    abcess tooth    SURGICAL HISTORY OF -   1999    L4-5 laminectomy, cauda equina syndrome    SURGICAL HISTORY OF -   +    herniated disk repair    TONSILLECTOMY  10/1964    TRANSPOSITION ULNAR NERVE (ELBOW)      right    ZZC APPENDECTOMY  1974     albuterol (PROAIR HFA/PROVENTIL HFA/VENTOLIN HFA) 108 (90 Base) MCG/ACT inhaler  aspirin (ASA) 81 MG chewable tablet  baclofen (LIORESAL) 10 MG tablet  blood glucose (NO BRAND SPECIFIED) test strip  blood glucose monitoring (NO BRAND SPECIFIED) meter device kit  carvedilol (COREG) 12.5 MG  tablet  empagliflozin (JARDIANCE) 10 MG TABS tablet  gabapentin (NEURONTIN) 300 MG capsule  glucose 40 % (400 mg/mL) gel  insulin glargine (LANTUS PEN) 100 UNIT/ML pen  losartan (COZAAR) 25 MG tablet  medical cannabis (Patient's own supply)  multivitamin, therapeutic (THERA-VIT) TABS tablet  ondansetron (ZOFRAN) 4 MG tablet  oxyCODONE IR (ROXICODONE) 10 MG tablet  pantoprazole (PROTONIX) 40 MG EC tablet  rosuvastatin (CRESTOR) 40 MG tablet  sertraline (ZOLOFT) 100 MG tablet  thiamine (B-1) 100 MG tablet  thin (NO BRAND SPECIFIED) lancets  triamterene-HCTZ (DYAZIDE) 37.5-25 MG capsule      Allergies   Allergen Reactions    Levaquin Anaphylaxis and Rash     Swelling in lip and tongue felt thick    Lisinopril Anaphylaxis     Swollen tongue; inability to swallow; drooling; hives; swollen face, neck, angioedema    Acetaminophen      Hx of cirrhosis     Amlodipine      Swelling, hives, possible angioedema      Morphine      b/p dropped and arms went numb  Hypotension    Quinolones Hives    Spironolactone Unknown     Pt believes himself to be allergic to it; cannot find his old records of this.-mjc     Bactrim [Sulfamethoxazole-Trimethoprim] Rash     Family History  Family History   Problem Relation Age of Onset    Cancer Mother         lung    Breast Cancer Mother     Other Cancer Mother     Cancer Father         esophogeal, GERD    Snoring Father     Diabetes Brother         amputation, Type 1    Diabetes Brother     Diabetes Brother     Cancer - colorectal No family hx of     Glaucoma No family hx of     Macular Degeneration No family hx of     Anesthesia Reaction No family hx of     Venous thrombosis No family hx of      Social History   Social History     Tobacco Use    Smoking status: Former     Current packs/day: 0.00     Types: Cigarettes     Start date: 10/13/1983     Quit date: 1984     Years since quittin.1     Passive exposure: Past    Smokeless tobacco: Never   Vaping Use    Vaping status: Every Day     Substances: THC, CBD    Devices: RefNexsanble tank   Substance Use Topics    Alcohol use: Not Currently     Comment: - last drink was 3/28/14    Drug use: Yes     Frequency: 2.0 times per week     Types: Marijuana     Comment: medical marijuana - vape daily      A medically appropriate review of systems was performed with pertinent positives and negatives noted in the HPI, and all other systems negative.    Physical Exam   BP: 138/80  Pulse: 70  Temp: 98.4  F (36.9  C)  Resp: 18  SpO2: 97 %  Physical Exam  Constitutional:       Appearance: Normal appearance.   HENT:      Head: Normocephalic and atraumatic.      Nose: Nose normal.   Eyes:      Conjunctiva/sclera: Conjunctivae normal.   Cardiovascular:      Pulses:           Dorsalis pedis pulses are detected w/ Doppler on the left side.        Posterior tibial pulses are detected w/ Doppler on the left side.   Pulmonary:      Effort: Pulmonary effort is normal.   Abdominal:      General: Abdomen is flat.   Musculoskeletal:         General: No tenderness.      Cervical back: Normal range of motion.      Left lower leg: No edema.      Comments: Ankle and knee ROM intact. Comparments soft.   Skin:     General: Skin is warm.      Comments: Picture of wound in chart. 1cm x 2cm skin tear on anterior aspect of left leg. Surrounding skin is not warm, edematous, or erythematous, but has some bruising  surrounding the tear. Small amount of oozing blood, no purulence. No pain to palpation around, or below/above tear.    Neurological:      Mental Status: He is alert.   Psychiatric:         Attention and Perception: Attention normal.           ED Course, Procedures, & Data      Procedures          Medications - No data to display       Critical care was not performed.     Medical Decision Making  The patient's presentation was of low complexity (an acute and uncomplicated illness or injury).    The patient's evaluation involved:  history and exam without other MDM data  elements    The patient's management necessitated only low risk treatment.    Assessment & Plan    Erwin Aguilar is a 64 year old male with a history of PAD s/p bilateral lower extremity angioplasty and right BKA, who presents for a wound check. He presents to the ED with all vitals wnl and non-toxic appearing. On exam pt has a superficial skin tear with some bruising but no signs of infection including edema, erythema, warmth, or discharge. Deferred labs d/t appearence of the wound and stable clinical appearance.This appears to be a superficial skin tear. Low concern for cellulitis at this time d/t exam findings. No pain out of proportion, or significant exam findings to warrant concern for nec fasciitis. Compartment syndrome unlikely with soft compartments on exam and no progressive pain. Considered acute worsening of PAD in addition to skin tear, but distal pulses intact, no new weakness, or changes in sensation of the LE.     Will discharge pt home to continue wound care as needed. Discussed use of topical antibiotics to prevent infection and return precautions such as changes in his wound including le edema, erythema, or new pain.    --    ED Attending Physician Attestation    I HOMAR BOUDREAUX MD, cared for this patient with the Resident. I have performed a history and physical examination of the patient and discussed management with the resident. I reviewed the resident's documentation above and agree with the documented findings and plan of care.    Summary of HPI, PE, ED Course   Patient is a 64 year old male evaluated in the emergency department for a wound check. Exam and ED course notable for exam notable for skin tears but no lacerations or abrasions.. After the completion of care in the emergency department, the patient was discharged.      HOMAR BOUDREAUX MD  Emergency Medicine       I have reviewed the nursing notes. I have reviewed the findings, diagnosis, plan and need for follow up with the  patient.    Discharge Medication List as of 7/24/2025  1:20 PM          Final diagnoses:   Skin tear of left lower leg without complication, initial encounter     Bernard Fleming PGY-1  Jt OLIVEROS [facility] EMERGENCY DEPARTMENT  7/24/2025     Jt Linton MD  07/25/25 1101

## 2025-07-28 ENCOUNTER — TELEPHONE (OUTPATIENT)
Dept: FAMILY MEDICINE | Facility: CLINIC | Age: 65
End: 2025-07-28
Payer: MEDICARE

## 2025-07-28 ENCOUNTER — MYC MEDICAL ADVICE (OUTPATIENT)
Dept: FAMILY MEDICINE | Facility: CLINIC | Age: 65
End: 2025-07-28
Payer: MEDICARE

## 2025-07-28 NOTE — TELEPHONE ENCOUNTER
I agree those wounds do not look like the kind of wounds that come from peripheral vascular disease.       I would recommend for dressings:     Clean gently with luke warm water/soap using a wash cloth as able and gently rub away any dead tissue or drainage/scab, etc.   Apply vaseline to prevent dressing from sticking (anti-biotic ointment not necessary and people often develop allergy)  Apply telfa non-stick pads to open areas (or similar)  Wrap with kerlix guaze wrap  Secure dressing with coban self-adherent wrap so does not have to put tape on skin.         If desires virtual visit ok to use candy, virtual or triage hold spots.     Joel Wegener,MD

## 2025-07-28 NOTE — TELEPHONE ENCOUNTER
JW,    Patient calling to request advice for wound care  Patient in ED 7/24 for what was diagnosed as skin tears on his left leg  States he has a history of PAD and was concerned previous stent placement may be failing be ED doc stated was just skin tears  Patient states has been managing wound care since coming home  Only has the following supplies:  Gauze  Gauze wrap  Ace bandage  Saline  States that whenever he tries to change the bandage it is removing whatever scab has formed and restarts the bleeding/healing process  Patient reports he has been saturating the dressing with normal saline prior to removal to try to minimize this but it is not working  Nurse advised may be best to be seen for a visit   Patient declined triage and scheduling at this time and requested message be sent to PCP for advice  States would be open to a virtual visit if PCP recommends but unable to come in person  States original wound picture should be in MyChart from ED visit  Please advise    Thanks,  Annetta TAYLOR RN

## 2025-07-28 NOTE — TELEPHONE ENCOUNTER
Called and updated patient on PCP recommendations  Patient opted to schedule virtual with provider to discuss in more detail   Scheduled for tomorrow with pcp  Patient verbalized understanding and agrees with plan of care.   Will call back with new or worsening symptoms    Annetta TAYLOR RN

## 2025-07-29 ENCOUNTER — VIRTUAL VISIT (OUTPATIENT)
Dept: FAMILY MEDICINE | Facility: CLINIC | Age: 65
End: 2025-07-29
Payer: MEDICARE

## 2025-07-29 ENCOUNTER — PATIENT OUTREACH (OUTPATIENT)
Dept: CARE COORDINATION | Facility: CLINIC | Age: 65
End: 2025-07-29

## 2025-07-29 ENCOUNTER — MYC MEDICAL ADVICE (OUTPATIENT)
Dept: FAMILY MEDICINE | Facility: CLINIC | Age: 65
End: 2025-07-29

## 2025-07-29 DIAGNOSIS — S81.002A OPEN WOUND OF LEFT KNEE, LEG, AND ANKLE, INITIAL ENCOUNTER: Primary | ICD-10-CM

## 2025-07-29 DIAGNOSIS — I73.9 PVD (PERIPHERAL VASCULAR DISEASE): ICD-10-CM

## 2025-07-29 DIAGNOSIS — Z95.9 S/P ARTERIAL STENT: ICD-10-CM

## 2025-07-29 DIAGNOSIS — S81.802A OPEN WOUND OF LEFT KNEE, LEG, AND ANKLE, INITIAL ENCOUNTER: Primary | ICD-10-CM

## 2025-07-29 DIAGNOSIS — S91.002A OPEN WOUND OF LEFT KNEE, LEG, AND ANKLE, INITIAL ENCOUNTER: Primary | ICD-10-CM

## 2025-07-29 PROBLEM — I70.228: Status: RESOLVED | Noted: 2024-04-23 | Resolved: 2025-07-29

## 2025-07-29 PROCEDURE — 98006 SYNCH AUDIO-VIDEO EST MOD 30: CPT | Performed by: FAMILY MEDICINE

## 2025-07-29 NOTE — LETTER
Erwin Aguilar  [address]   [place] [address]    Dear Erwin Aguilar,      I am a team member within the [facility] with [facility]. I recently tried to reach you to ensure you were doing well following a recent visit within our health system. I also wanted to take this chance to introduce Clinic Care Coordination.     Below is a description of Clinic Care Coordination and how this team can further assist you:       The Clinic Care Coordination team is made up of a Registered Nurse, , Financial Resource Worker, and a Community Health Worker who understand and can help navigate the health care system. The goal of clinic care coordination is to help you manage your health, improve access to care, and achieve optimal health outcomes. They work alongside your provider to assist you in determining your health and social needs, obtain health care and community resources, and provide you with necessary information and education. Clinic Care Coordination can work with you through any barriers and develop a care plan that helps coordinate and strengthen the relationship between you and your care team.    If you wish to connect with the Clinic Care Coordination Team, please let your [facility] Primary Care Provider or Clinic Care Team know and they can place a referral. The Clinic Care Coordination team will then reach out by phone to further support you.    We are focused on providing you with the highest-quality healthcare experience possible.    Sincerely,   Your care team with [facility]'s [address] [place] [address] ([phone]).

## 2025-07-29 NOTE — PROGRESS NOTES
Erwin is a 64 year old who is being evaluated via a billable video visit.    How would you like to obtain your AVS? Boticca  If the video visit is dropped, the invitation should be resent by: Text to cell phone: [phone]  Will anyone else be joining your video visit? No      Assessment & Plan       Blisters on left shin, now open wounds. (See previous pictures sent via [employer] reviewed) He feels they did NOT come from scratching as he was told in ED.  Feels wounds have not healed and if anything have worsened. Do not seem infected, no erythema around them but do have a rim of black dead tissue.  Has h/o PVD with femoral artery stents April 2024 and told would only last 1-2 years so very concerned these are actually sores from vascular insufficiency and not scratching.      Plan:      - urgent vascular referral givne.   - wound care referral given.   - for now recommend aquaphor or vaseline on wounds after gentle cleaning with mild soap/water  - cover with tegaderm non-stick pad and then wrap with kerlix gauze.   - secure with either coban self-adherent wrap or ace-wrap.       Regarding other chronic issues:  Able to walk well now on right prosthesis!  Also finally much more energy again!     Regarding chronic pain/buprenorphine I will send [employer] message with info. (Done today)    The longitudinal plan of care for the diagnosis(es)/condition(s) as documented were addressed during this visit. Due to the added complexity in care, I will continue to support Erwin in the subsequent management and with ongoing continuity of care.    Open wound of left knee, leg, and ankle, initial encounter      PVD (peripheral vascular disease)      femoral artery stent      Follow-up   No follow-ups on file.        Subjective   Erwin is a 64 year old, presenting for the following health issues:  No chief complaint on file.        7/29/2025     8:17 AM   Additional Questions   Roomed by Catrachita VAUGHN       Video Start Time: 9:54    HPI       Skin tear left lower leg  Tuesday night - scratched leg and skin opened up  Bandaging and cleaning it - every time the bandage comes off, it continues bleeding  3-4 inches long, 2 spots that are bleeding    Patient requesting bandages that don't stick to wound                Objective           Vitals:  No vitals were obtained today due to virtual visit.    Physical Exam   GENERAL: alert and no distress  EYES: Eyes grossly normal to inspection.  No discharge or erythema, or obvious scleral/conjunctival abnormalities.  RESP: No audible wheeze, cough, or visible cyanosis.    SKIN: Visible skin clear. No significant rash, abnormal pigmentation or lesions.  NEURO: Cranial nerves grossly intact.  Mentation and speech appropriate for age.  PSYCH: Appropriate affect, tone, and pace of words          Video-Visit Details    Type of service:  Video Visit   Video End Time:10:11  Originating Location (pt. Location): Home    Distant Location (provider location):  On-site  Platform used for Video Visit: Kameron  Signed Electronically by: Joel Daniel Wegener, MD

## 2025-07-29 NOTE — PATIENT INSTRUCTIONS
Blisters on left shin, now open wounds.  He feels they did NOT come from scratching as he was told in ED.  Feels wounds have not healed and if anything have worsened. Do not seem infected, no erythema around them but do have a rim of black dead tissue.  Has h/o PVD with femoral artery stents April 2024 and told would only last 1-2 years so very concerned these are actually sores from vascular insufficiency and not scratching.      Plan:      - urgent vascular referral given  - wound care referral given.   - for now recommend aquaphor or vaseline on wounds after gentle cleaning with mild soap/water  - cover with tegaderm non-stick pad and then wrap with kerlix gauze.   - secure with either coban self-adherent wrap or ace-wrap.   (Send supply orders to [facility])      Regarding other chronic issues:  Able to walk well now on right prosthesis!  Also finally much more energy again!     Regarding chronic pain/buprenorphine I will send [email or web address] message with info.

## 2025-07-29 NOTE — PROGRESS NOTES
Clinic Care Coordination Contact  Community Health Worker Initial Outreach    CHW Initial Information Gathering:  Referral Source: ED Follow-Up  Current living arrangement:: Not Assessed  No PCP office visit in Past Year: No  CHW Additional Questions  If ED/Hospital discharge, follow-up appointment scheduled as recommended?: Yes  Medication changes made following ED/Hospital discharge?: No  MyChart active?: Yes  Patient sent Social Drivers of Health questionnaire?: No    Patient accepts CC: No, Patient expressed no additional support is needed at this time. Patient will be sent Care Coordination introduction letter for future reference.     Oma Salas  Community Health Worker    Connected Care Resources   [facility]     *Connected Care Resources Team does NOT   follow patient ongoing. Referrals are identified   based on internal discharge report and the outreach is to ensure   Patient has understanding of their discharge instructions.

## 2025-07-30 ENCOUNTER — TELEPHONE (OUTPATIENT)
Dept: VASCULAR SURGERY | Facility: CLINIC | Age: 65
End: 2025-07-30
Payer: MEDICARE

## 2025-07-30 NOTE — TELEPHONE ENCOUNTER
Vascular Referral Intake    Appointment note (to be pasted into appt note. Also add where additional info is located ie: outside images pushed to PACS, in Epic, sent to HIM, etc):   Referred by Wegener, Joel Daniel Irwin, MD in [facility] for Open wound of left knee, leg, and ankle, initial encounter [S81.002A, S81.802A, ...  PVD (peripheral vascular disease) [I73.9]  S/P arterial stent [Z95.9]  Diabetes      Specialty: Wound Clinic    Is there more than One Provider Consult Needed: No (If yes, please list in specific provider and explain under special instructions)    Specific Provider if Necessary:  Dariana Morrison NP if available ([place] requested) or next available per pt request    Visit Type: Return - previously followed with Dr. Up; see chart as pt has been a no show, referred to vascular multiple times    Time Frame: within 1 week if possible (provider requested urgent 3-5 days; pt with hx of BKA and concern for blood flow in this leg also)    Testing/Imaging Needed Before Consult: ABIs without exercise [id number] - prioritize wound appointment even if unable to perform ABIs prior    Олег or bed needed: No    Special Instructions: No    *Schedulers: Please send welcome letter to patient after appointment(s) scheduled*

## 2025-07-30 NOTE — TELEPHONE ENCOUNTER
Reviewed with  at [facility], and she is okay with patient following with [facility] vascular. Patient will need abis and a LLE arterial ultrasound and visit with surgeon to follow for PAD. Please send back once scheduled for imaging to be ordered.

## 2025-07-30 NOTE — TELEPHONE ENCOUNTER
Noted that pt was also referred to vascular medicine. Pt is followed at [facility] so message was forwarded to [facility]  as appears vascular surgery is more appropriate than vascular medicine.

## 2025-07-30 NOTE — TELEPHONE ENCOUNTER
Spoke to patient and he wants [facility] said [place] would not work. He is also getting  on the 13 and has to find transportation to get to appointment. He was going to look at schedule and call back

## 2025-07-31 NOTE — TELEPHONE ENCOUNTER
Attempt #2, LVMTCB to schedule wound appointment (pt prefers the [place] office per note below) and 2 ultrsounds and visit with vascular surgery. [phone]

## 2025-07-31 NOTE — TELEPHONE ENCOUNTER
Patient called to schedule wound appt. He asked for Jeovanny since seen him before and only wants vince. He hung uo before I could talk to him about scheduling vascular appointments

## 2025-08-04 ENCOUNTER — OFFICE VISIT (OUTPATIENT)
Dept: VASCULAR SURGERY | Facility: CLINIC | Age: 65
End: 2025-08-04
Attending: FAMILY MEDICINE
Payer: MEDICARE

## 2025-08-04 ENCOUNTER — TELEPHONE (OUTPATIENT)
Dept: FAMILY MEDICINE | Facility: CLINIC | Age: 65
End: 2025-08-04

## 2025-08-04 VITALS
DIASTOLIC BLOOD PRESSURE: 77 MMHG | HEART RATE: 71 BPM | RESPIRATION RATE: 16 BRPM | SYSTOLIC BLOOD PRESSURE: 133 MMHG | TEMPERATURE: 97.8 F

## 2025-08-04 DIAGNOSIS — I73.9 PVD (PERIPHERAL VASCULAR DISEASE): ICD-10-CM

## 2025-08-04 DIAGNOSIS — S81.802A OPEN WOUND OF LEFT KNEE, LEG, AND ANKLE, INITIAL ENCOUNTER: ICD-10-CM

## 2025-08-04 DIAGNOSIS — S91.002A OPEN WOUND OF LEFT KNEE, LEG, AND ANKLE, INITIAL ENCOUNTER: ICD-10-CM

## 2025-08-04 DIAGNOSIS — S81.002A OPEN WOUND OF LEFT KNEE, LEG, AND ANKLE, INITIAL ENCOUNTER: ICD-10-CM

## 2025-08-04 DIAGNOSIS — Z95.9 S/P ARTERIAL STENT: ICD-10-CM

## 2025-08-04 PROCEDURE — G0463 HOSPITAL OUTPT CLINIC VISIT: HCPCS | Performed by: FAMILY MEDICINE

## 2025-08-04 PROCEDURE — 3078F DIAST BP <80 MM HG: CPT | Performed by: FAMILY MEDICINE

## 2025-08-04 PROCEDURE — 3075F SYST BP GE 130 - 139MM HG: CPT | Performed by: FAMILY MEDICINE

## 2025-08-04 PROCEDURE — 99214 OFFICE O/P EST MOD 30 MIN: CPT | Performed by: FAMILY MEDICINE

## 2025-08-04 PROCEDURE — 1125F AMNT PAIN NOTED PAIN PRSNT: CPT | Performed by: FAMILY MEDICINE

## 2025-08-04 ASSESSMENT — PAIN SCALES - GENERAL: PAINLEVEL_OUTOF10: MODERATE PAIN (6)

## 2025-08-06 ENCOUNTER — MEDICAL CORRESPONDENCE (OUTPATIENT)
Dept: HEALTH INFORMATION MANAGEMENT | Facility: CLINIC | Age: 65
End: 2025-08-06
Payer: MEDICARE

## 2025-08-06 ENCOUNTER — MYC REFILL (OUTPATIENT)
Dept: FAMILY MEDICINE | Facility: CLINIC | Age: 65
End: 2025-08-06
Payer: MEDICARE

## 2025-08-06 DIAGNOSIS — R63.4 UNINTENTIONAL WEIGHT LOSS: ICD-10-CM

## 2025-08-06 DIAGNOSIS — Z98.890 H/O FOOT SURGERY: ICD-10-CM

## 2025-08-06 DIAGNOSIS — G89.29 CHRONIC PAIN OF RIGHT ANKLE: ICD-10-CM

## 2025-08-06 DIAGNOSIS — G89.4 CHRONIC PAIN SYNDROME: ICD-10-CM

## 2025-08-06 DIAGNOSIS — F11.90 CHRONIC, CONTINUOUS USE OF OPIOIDS: ICD-10-CM

## 2025-08-06 DIAGNOSIS — M76.60 CALCIFIC ACHILLES TENDINITIS: ICD-10-CM

## 2025-08-06 DIAGNOSIS — M79.671 INTRACTABLE RIGHT HEEL PAIN: ICD-10-CM

## 2025-08-06 DIAGNOSIS — S86.011A RUPTURE OF RIGHT ACHILLES TENDON, INITIAL ENCOUNTER: ICD-10-CM

## 2025-08-06 DIAGNOSIS — F11.20 CONTINUOUS OPIOID DEPENDENCE (H): ICD-10-CM

## 2025-08-06 DIAGNOSIS — M21.6X1 ACQUIRED CALCANEUS DEFORMITY OF RIGHT ANKLE: ICD-10-CM

## 2025-08-06 DIAGNOSIS — M25.571 CHRONIC PAIN OF RIGHT ANKLE: ICD-10-CM

## 2025-08-07 ENCOUNTER — TELEPHONE (OUTPATIENT)
Dept: FAMILY MEDICINE | Facility: CLINIC | Age: 65
End: 2025-08-07
Payer: MEDICARE

## 2025-08-07 ENCOUNTER — MYC MEDICAL ADVICE (OUTPATIENT)
Dept: FAMILY MEDICINE | Facility: CLINIC | Age: 65
End: 2025-08-07
Payer: MEDICARE

## 2025-08-11 ENCOUNTER — MYC MEDICAL ADVICE (OUTPATIENT)
Dept: FAMILY MEDICINE | Facility: CLINIC | Age: 65
End: 2025-08-11
Payer: MEDICARE

## 2025-08-11 DIAGNOSIS — F11.90 CHRONIC, CONTINUOUS USE OF OPIOIDS: ICD-10-CM

## 2025-08-11 DIAGNOSIS — M79.671 INTRACTABLE RIGHT HEEL PAIN: ICD-10-CM

## 2025-08-11 DIAGNOSIS — M25.571 CHRONIC PAIN OF RIGHT ANKLE: ICD-10-CM

## 2025-08-11 DIAGNOSIS — G89.4 CHRONIC PAIN SYNDROME: ICD-10-CM

## 2025-08-11 DIAGNOSIS — M76.60 CALCIFIC ACHILLES TENDINITIS: ICD-10-CM

## 2025-08-11 DIAGNOSIS — R63.4 UNINTENTIONAL WEIGHT LOSS: ICD-10-CM

## 2025-08-11 DIAGNOSIS — F11.20 CONTINUOUS OPIOID DEPENDENCE (H): ICD-10-CM

## 2025-08-11 DIAGNOSIS — Z98.890 H/O FOOT SURGERY: ICD-10-CM

## 2025-08-11 DIAGNOSIS — G89.29 CHRONIC PAIN OF RIGHT ANKLE: ICD-10-CM

## 2025-08-11 DIAGNOSIS — M21.6X1 ACQUIRED CALCANEUS DEFORMITY OF RIGHT ANKLE: ICD-10-CM

## 2025-08-11 DIAGNOSIS — S86.011A RUPTURE OF RIGHT ACHILLES TENDON, INITIAL ENCOUNTER: ICD-10-CM

## 2025-08-11 RX ORDER — OXYCODONE HYDROCHLORIDE 10 MG/1
10 TABLET ORAL
Qty: 150 TABLET | Refills: 0 | Status: SHIPPED | OUTPATIENT
Start: 2025-08-11

## 2025-08-20 RX ORDER — BUPRENORPHINE 2 MG/1
1 TABLET SUBLINGUAL 3 TIMES DAILY
Qty: 23 TABLET | Refills: 0 | Status: SHIPPED | OUTPATIENT
Start: 2025-08-20 | End: 2025-09-04

## 2025-08-28 ENCOUNTER — MYC REFILL (OUTPATIENT)
Dept: CARDIOLOGY | Facility: CLINIC | Age: 65
End: 2025-08-28
Payer: MEDICARE

## 2025-08-28 DIAGNOSIS — E78.5 HYPERLIPIDEMIA LDL GOAL <100: ICD-10-CM

## 2025-08-28 RX ORDER — ROSUVASTATIN CALCIUM 40 MG/1
40 TABLET, COATED ORAL DAILY
Qty: 90 TABLET | Refills: 1 | Status: SHIPPED | OUTPATIENT
Start: 2025-08-28

## 2025-08-29 ENCOUNTER — MYC MEDICAL ADVICE (OUTPATIENT)
Dept: FAMILY MEDICINE | Facility: CLINIC | Age: 65
End: 2025-08-29
Payer: MEDICARE

## 2025-09-02 ENCOUNTER — NURSE TRIAGE (OUTPATIENT)
Dept: FAMILY MEDICINE | Facility: CLINIC | Age: 65
End: 2025-09-02
Payer: MEDICARE

## 2025-09-02 DIAGNOSIS — M21.6X1 ACQUIRED CALCANEUS DEFORMITY OF RIGHT ANKLE: ICD-10-CM

## 2025-09-02 DIAGNOSIS — M79.671 INTRACTABLE RIGHT HEEL PAIN: ICD-10-CM

## 2025-09-02 DIAGNOSIS — M25.571 CHRONIC PAIN OF RIGHT ANKLE: ICD-10-CM

## 2025-09-02 DIAGNOSIS — F11.90 CHRONIC, CONTINUOUS USE OF OPIOIDS: ICD-10-CM

## 2025-09-02 DIAGNOSIS — Z98.890 H/O FOOT SURGERY: ICD-10-CM

## 2025-09-02 DIAGNOSIS — M76.60 CALCIFIC ACHILLES TENDINITIS: ICD-10-CM

## 2025-09-02 DIAGNOSIS — G89.29 CHRONIC PAIN OF RIGHT ANKLE: ICD-10-CM

## 2025-09-02 DIAGNOSIS — F11.20 CONTINUOUS OPIOID DEPENDENCE (H): ICD-10-CM

## 2025-09-02 DIAGNOSIS — G89.4 CHRONIC PAIN SYNDROME: ICD-10-CM

## 2025-09-03 ENCOUNTER — MYC MEDICAL ADVICE (OUTPATIENT)
Dept: FAMILY MEDICINE | Facility: CLINIC | Age: 65
End: 2025-09-03
Payer: MEDICARE

## 2025-09-03 RX ORDER — BUPRENORPHINE 2 MG/1
2 TABLET SUBLINGUAL 4 TIMES DAILY
Qty: 56 TABLET | Refills: 0 | Status: SHIPPED | OUTPATIENT
Start: 2025-09-03 | End: 2025-09-17

## (undated) DEVICE — SU ETHIBOND 2-0 CT-2 CR 8X18" CX26D

## (undated) DEVICE — DRAIN CHEST TUBE 32FR STR 8032

## (undated) DEVICE — STRAP KNEE/BODY 31143004

## (undated) DEVICE — CLOSURE DEVICE 6FR VASC PROGLIDE MEDICATED SUTURE 12673-03

## (undated) DEVICE — SU ETHIBOND 2-0 SHDA 30" X563H

## (undated) DEVICE — BNDG ELASTIC 6"X5YDS STERILE 6611-6S

## (undated) DEVICE — Device

## (undated) DEVICE — PACK MINOR CUSTOM ASC

## (undated) DEVICE — INTRO SHEATH BRITE TIP 5FRX11CM 401-511M

## (undated) DEVICE — SOL NACL 0.9% INJ 1000ML BAG 2B1324X

## (undated) DEVICE — SU PROLENE 8-0 BV130-5DA 24" 8732H

## (undated) DEVICE — LINEN TOWEL PACK X5 5464

## (undated) DEVICE — CATH OMNIFLUSH 5FRX70CM SIZING 13709702

## (undated) DEVICE — DRAPE FLUID WARMING 52 X 60" ORS-321

## (undated) DEVICE — TUBING MEDRAD 48" HIGH PRESSURE MX694

## (undated) DEVICE — PACK LOWER EXTREMITY RIVERSIDE SOP32LEFSX

## (undated) DEVICE — ESU GROUND PAD ADULT W/CORD E7507

## (undated) DEVICE — DECANTER BAG 2002S

## (undated) DEVICE — SU ETHILON 3-0 PS-1 18" 1663G

## (undated) DEVICE — WIPES FOLEY CARE SURESTEP PROVON DFC100

## (undated) DEVICE — ADH LIQUID MASTISOL TOPICAL VIAL 2-3ML 0523-48

## (undated) DEVICE — SURGICEL HEMOSTAT 4X8" 1952

## (undated) DEVICE — DRSG BIOPATCH GERMICIDAL SPLIT SPONGE 4MM MED 4150

## (undated) DEVICE — TOURNIQUET CUFF 30" REPRO BLUE 60-7070-105

## (undated) DEVICE — SOL WATER IRRIG 1000ML BOTTLE 2F7114

## (undated) DEVICE — SPONGE RAY-TEC 4X8" 7318

## (undated) DEVICE — SUCTION CATH AIRLIFE TRI-FLO W/CONTROL PORT 14FR  T60C

## (undated) DEVICE — SPECIMEN CONTAINER 5OZ STERILE 2600SA

## (undated) DEVICE — BLADE KNIFE SURG 11 371111

## (undated) DEVICE — BNDG ELASTIC 4" DBL LENGTH UNSTERILE 6611-14

## (undated) DEVICE — DRAIN CHEST TUBE RIGHT ANGLED 28FR 8128

## (undated) DEVICE — POSITIONER ASSIST ESSTECH 3S T401210S

## (undated) DEVICE — DRSG TELFA 3X8" 1238

## (undated) DEVICE — SU DERMABOND ADVANCED .7ML DNX12

## (undated) DEVICE — DRAPE U SPLIT 74X120" 29440

## (undated) DEVICE — PROTECTOR ARM ONE-STEP TRENDELENBURG 40418 (COI)

## (undated) DEVICE — GLOVE BIOGEL PI MICRO INDICATOR UNDERGLOVE SZ 8.0 48980

## (undated) DEVICE — GLOVE PROTEXIS BLUE W/NEU-THERA 8.0  2D73EB80

## (undated) DEVICE — SUCTION DRY CHEST DRAIN OASIS 3600-100

## (undated) DEVICE — RX SURGIFLO HEMOSTATIC MATRIX W/THROMBIN 8ML 2994

## (undated) DEVICE — GLOVE BIOGEL PI SZ 8.0 40880

## (undated) DEVICE — LEAD ELEC MYOCARDIO PACING TEMPORARY MEDTRONIC

## (undated) DEVICE — PREP POVIDONE-IODINE 10% SOLUTION 4OZ BOTTLE MDS093944

## (undated) DEVICE — LINEN GOWN XLG 5407

## (undated) DEVICE — SYR 10ML LL W/O NDL 302995

## (undated) DEVICE — SU ETHILON 2-0 FS 18" 664H

## (undated) DEVICE — DRAPE C-ARMOR 5 SIDED 5523

## (undated) DEVICE — KIT MICRO-INTRODUCER STIFFEN 4FR 7274V

## (undated) DEVICE — DRSG GAUZE FLUFTEX RADIOPAQUE 4.5"X4.1YD 11-020

## (undated) DEVICE — SPONGE LAP 18X18" X8435

## (undated) DEVICE — PREP CHLORAPREP 26ML TINTED ORANGE  260815

## (undated) DEVICE — LEAD PACER MYOCARDIAL BIPOLAR TEMPORARY 53CM 6495F

## (undated) DEVICE — DRSG ABDOMINAL 07 1/2X8" 7197D

## (undated) DEVICE — PREP DURAPREP REMOVER 4OZ 8611

## (undated) DEVICE — TAPE MEASURE VASCULAR 30CM GLOW N TELL  1100-01

## (undated) DEVICE — SOL NACL 0.9% IRRIG 1000ML BOTTLE 2F7124

## (undated) DEVICE — CLIP HORIZON MED BLUE 002200

## (undated) DEVICE — PACK ENT MINOR CUSTOM ASC

## (undated) DEVICE — GLOVE PROTEXIS POWDER FREE SMT 7.5  2D72PT75X

## (undated) DEVICE — DRAPE STOCKINETTE IMPERVIOUS 12" 1587

## (undated) DEVICE — CLIP SPRING FOGARTY SOFTJAW CSOFT6

## (undated) DEVICE — COVER EQUIP BAG W/BAND 36X28" LF 01-3628

## (undated) DEVICE — SU FIBERWIRE 2 38"  AR-7200

## (undated) DEVICE — DRSG AQUACEL AG 3.5X6.0" HYDROFIBER 412010

## (undated) DEVICE — SU SILK 0 TIE 6X30" A306H

## (undated) DEVICE — DRAPE STERI TOWEL LG 1010

## (undated) DEVICE — SUCTION FRAZIER 12FR W/HANDLE K73

## (undated) DEVICE — DRSG DRAIN 2X2" 7087

## (undated) DEVICE — BNDG ELASTIC 4"X5YDS STERILE 6611-4S

## (undated) DEVICE — PREP SKIN SCRUB TRAY 4461A

## (undated) DEVICE — GLOVE PROTEXIS MICRO 6.5  2D73PM65

## (undated) DEVICE — SU SILK 2-0 TIE 12X30" A305H

## (undated) DEVICE — SUCTION MANIFOLD NEPTUNE 2 SYS 1 PORT 702-025-000

## (undated) DEVICE — LINEN DRAPE 54X72" 5467

## (undated) DEVICE — CATH IMAGING ULTRASOUND VISIONS PV.035 7FRX90CM 88901

## (undated) DEVICE — SU VICRYL 0 CTX 36" J370H

## (undated) DEVICE — SU MONOCRYL 4-0 PS-2 27" UND Y426H

## (undated) DEVICE — SU PROLENE 6-0 C-1DA 4X24" M8726

## (undated) DEVICE — SU PROLENE 5-0 RB-2DA 30" 8710H

## (undated) DEVICE — CATH NAVICROSS SUPPORT 12MMX135CM ANG NC35131

## (undated) DEVICE — DRAIN JACKSON PRATT RESERVOIR 100ML SU130-1305

## (undated) DEVICE — SUTURE VICRYL+ 2-0 CT-2 27" UND VCP269H

## (undated) DEVICE — SUCTION TIP YANKAUER STR K87

## (undated) DEVICE — DRAIN JACKSON PRATT ROUND W/TROCAR 15FR LF JP-HUR151

## (undated) DEVICE — COVER CAMERA IN-LIGHT DISP LT-C02

## (undated) DEVICE — SU PROLENE 4-0 RB-1DA 36" 8557H

## (undated) DEVICE — PUNCH AORTIC 4.0MMX8" RCB40

## (undated) DEVICE — TUBING INSUFFLATION PNEUMOCLEAR 0620050100

## (undated) DEVICE — CABLE MYO/LEAD PACING WHITE DISP 019-530

## (undated) DEVICE — BLADE KNIFE SURG 10 371110

## (undated) DEVICE — BLADE SAW SAGITTAL STRK 25X75X0.89MM SYS 6 6125-089-075

## (undated) DEVICE — SU VICRYL 2-0 CT-2 27" UND J269H

## (undated) DEVICE — IMM LEG ELEVATOR 79-90191

## (undated) DEVICE — CLIP HORIZON SM RED WIDE SLOT 001201

## (undated) DEVICE — LINEN ORTHO PACK 5446

## (undated) DEVICE — NDL SPINAL 22GA 3.5" QUINCKE 405181

## (undated) DEVICE — DRAPE C-ARM W/STRAPS 42X72" 07-CA104

## (undated) DEVICE — DRAPE IOBAN INCISE 23X17" 6650EZ

## (undated) DEVICE — BLOWER/MISTER CLEARVIEW 22150

## (undated) DEVICE — SU PROLENE 7-0 BV175-6 24" 8735H

## (undated) DEVICE — SUCTION MANIFOLD NEPTUNE 2 SYS 4 PORT 0702-020-000

## (undated) DEVICE — SU ETHILON 3-0 PS-1 18" 1663H

## (undated) DEVICE — SU VICRYL 3-0 SH 8X18" UND J864D

## (undated) DEVICE — SYR 03ML LL W/O NDL 309657

## (undated) DEVICE — DRSG GAUZE 4X4" 3033

## (undated) DEVICE — SU VICRYL 0 CT 36" J358H

## (undated) DEVICE — CATH ANGIO ANG GLIDE 5FRX100CM CG508

## (undated) DEVICE — DRSG STERI STRIP 1X5" R1548

## (undated) DEVICE — SU MONOCRYL 4-0 PS-2 18" UND Y496G

## (undated) DEVICE — SU STEEL MYO/WIRE II STERNOTOMY 8 BE-1 3X14" 048-217

## (undated) DEVICE — NDL BLUNT 18GA 1" W/O FILTER 305181

## (undated) DEVICE — TAPE MEDIPORE 4"X2YD 2864

## (undated) DEVICE — ESU ELEC BLADE E-SEP INSULATED NEPTUNE 70MM 0703-070-002

## (undated) DEVICE — GLOVE PROTEXIS BLUE W/NEU-THERA 7.0  2D73EB70

## (undated) DEVICE — VESSEL LOOPS YELLOW MAXI 31145694

## (undated) DEVICE — SU PROLENE 6-0 C-1DA 30" 8706H

## (undated) DEVICE — LINEN TOWEL PACK X6 WHITE 5487

## (undated) DEVICE — PACK GOWN 3/PK DISP XL SBA32GPFCB

## (undated) DEVICE — DRSG TEGADERM 4X4 3/4" 1626W

## (undated) DEVICE — PREP POVIDONE-IODINE 7.5% SCRUB 4OZ BOTTLE MDS093945

## (undated) DEVICE — SU VICRYL 2-0 CT-1 CR 8X18" UND J839D

## (undated) DEVICE — BNDG ELASTIC 3"X5YDS UNSTERILE 6611-30

## (undated) DEVICE — SU PROLENE 4-0 SHDA 36" 8521H

## (undated) DEVICE — KIT ENDO VASOVIEW HEMOPRO 2 VH-4000

## (undated) DEVICE — SU PROLENE 7-0 BV-1DA 4X24" M8702

## (undated) DEVICE — CANNULA VESSEL 3ML BEVELED DPL 30000

## (undated) DEVICE — SU VICRYL 3-0 SH 27" UND J416H

## (undated) DEVICE — CABLE MYO/LEAD PACING BLUE DISP 019-535

## (undated) DEVICE — SOL NACL 0.9% 10ML VIAL 0409-4888-02

## (undated) DEVICE — SU ETHIBOND 3-0 BBDA 36" X588H

## (undated) DEVICE — PACK HAND CUSTOM ASC

## (undated) DEVICE — GUIDEWIRE TERUMO .035X260CM EXCHANGE ANG GS3509

## (undated) DEVICE — COVER LIGHT HANDLE MIS

## (undated) DEVICE — BLADE KNIFE BEAVER MICROSHARP GREEN 377515

## (undated) DEVICE — SOL WATER IRRIG 500ML BOTTLE 2F7113

## (undated) DEVICE — DRSG STERI STRIP 1/2X4" R1547

## (undated) DEVICE — LINEN TOWEL PACK X30 5481

## (undated) DEVICE — PACK LOWER EXTREMITY CUSTOM ASC

## (undated) DEVICE — SU PLEDGET SOFT TFE 3/8"X3/26"X1/16" PCP40

## (undated) DEVICE — DRAPE CONVERTORS U-DRAPE 60X72" 8476

## (undated) DEVICE — ESU PENCIL SMOKE EVAC W/ROCKER SWITCH 0703-047-000

## (undated) DEVICE — SURGICEL HEMOSTAT 2X14" 1951

## (undated) DEVICE — DRSG PRIMAPORE 02X3" 7133

## (undated) DEVICE — TUBING SUCTION DRAINAGE PLEURAL DUAL 8884714200

## (undated) DEVICE — SU STEEL 6 CCS 4X18" M654G

## (undated) DEVICE — DEVICE TISSUE STABILIZATION OCTOBASE 28707

## (undated) DEVICE — BLADE SAW SAGITTAL STRK XSHORT 25X9.5X0.6MM 2108-145-000

## (undated) DEVICE — NDL COUNTER 20CT 31142493

## (undated) DEVICE — CAST PADDING 3" STERILE 9043S

## (undated) DEVICE — GLOVE PROTEXIS W/NEU-THERA 7.5  2D73TE75

## (undated) DEVICE — GOWN XLG DISP 9545

## (undated) DEVICE — CONNECTOR SIMS TUBING FOR CHEST TUBES 361

## (undated) DEVICE — PREP CHLORAPREP 26ML TINTED HI-LITE ORANGE 930815

## (undated) DEVICE — SU VICRYL+ 3-0 FS1 27IN UND VCP442H

## (undated) DEVICE — TIES BANDING T50R

## (undated) DEVICE — ANTIFOG SOLUTION W/FOAM PAD 31142527

## (undated) DEVICE — CLAMP ATRICURE ENCOMPASS PARALLEL BIPLR DISP OLH

## (undated) DEVICE — SPONGE RAY-TEC 4X4" 7317

## (undated) DEVICE — STRAP UNIVERSAL POSITIONING 2-PIECE 4X47X76" 91-287

## (undated) DEVICE — DRSG DRAIN 4X4" 7086

## (undated) DEVICE — BLADE SAW STRK STERNAL 6207-97-101

## (undated) DEVICE — SOL NACL 0.9% IRRIG 500ML BOTTLE 2F7123

## (undated) DEVICE — SU ETHILON 2-0 PS 18" 585H

## (undated) DEVICE — SOL NACL 0.9% IRRIG 3000ML BAG 2B7477

## (undated) DEVICE — GUIDEWIRE TERUMO ADVANTAGE .035X260CM ANG GA3502

## (undated) DEVICE — PREP DURAPREP 26ML APL 8630

## (undated) DEVICE — CLIP HORIZON LG ORANGE 004200

## (undated) DEVICE — GLOVE PROTEXIS BLUE W/NEU-THERA 7.5  2D73EB75

## (undated) DEVICE — SYR 05ML GLASS PORTEX PULSATOR LF 4904

## (undated) DEVICE — SU ETHIBOND 0 CT-1 CR 8X18" CX21D

## (undated) DEVICE — GLOVE BIOGEL PI MICRO SZ 7.0 48570

## (undated) DEVICE — STPL SKIN 35W ROTATING HEAD PRW35

## (undated) DEVICE — SUTURE BOOTS 051003PBX

## (undated) DEVICE — SYR 01ML 27GA 0.5" NDL TBC 309623

## (undated) DEVICE — PACK ADULT HEART UMMC PV15CG92D

## (undated) DEVICE — LIGHT HANDLE X1 31140133

## (undated) DEVICE — GOWN REINFORCED XXLG 9071

## (undated) DEVICE — BLADE KNIFE BEAVER MINI STR BEAVER6900

## (undated) DEVICE — COVER CAMERA VIDEO LASER 9X96" 04-CC227

## (undated) DEVICE — SU VICRYL 2-0 CT-1 27" UND J259H

## (undated) DEVICE — DEFIB PRO-PADZ LVP LQD GEL ADULT 8900-2105-01

## (undated) DEVICE — DRSG GAUZE 4X8" NON21842

## (undated) DEVICE — INFLATION DEVICE ENCORE  26 PRESSURE GAUGE 20ML M0067101140

## (undated) DEVICE — SPONGE SURGIFOAM 100 1974

## (undated) DEVICE — BONE WAX 2.5GM W31G

## (undated) DEVICE — DRAPE LAP W/ARMBOARD 29410

## (undated) DEVICE — SYR BULB IRRIG DOVER 60 ML LATEX FREE 67000

## (undated) RX ORDER — FENTANYL CITRATE 50 UG/ML
INJECTION, SOLUTION INTRAMUSCULAR; INTRAVENOUS
Status: DISPENSED
Start: 2024-12-31

## (undated) RX ORDER — PROPOFOL 10 MG/ML
INJECTION, EMULSION INTRAVENOUS
Status: DISPENSED
Start: 2025-01-02

## (undated) RX ORDER — NOREPINEPHRINE BITARTRATE 0.06 MG/ML
INJECTION, SOLUTION INTRAVENOUS
Status: DISPENSED
Start: 2024-12-31

## (undated) RX ORDER — PROPOFOL 10 MG/ML
INJECTION, EMULSION INTRAVENOUS
Status: DISPENSED
Start: 2024-12-31

## (undated) RX ORDER — FENTANYL CITRATE 50 UG/ML
INJECTION, SOLUTION INTRAMUSCULAR; INTRAVENOUS
Status: DISPENSED
Start: 2024-04-23

## (undated) RX ORDER — IODIXANOL 320 MG/ML
INJECTION, SOLUTION INTRAVASCULAR
Status: DISPENSED
Start: 2024-04-23

## (undated) RX ORDER — HYDROMORPHONE HYDROCHLORIDE 1 MG/ML
INJECTION, SOLUTION INTRAMUSCULAR; INTRAVENOUS; SUBCUTANEOUS
Status: DISPENSED
Start: 2024-05-09

## (undated) RX ORDER — ACETAMINOPHEN 325 MG/1
TABLET ORAL
Status: DISPENSED
Start: 2023-08-31

## (undated) RX ORDER — TRANEXAMIC ACID 650 MG/1
TABLET ORAL
Status: DISPENSED
Start: 2024-04-18

## (undated) RX ORDER — FENTANYL CITRATE 50 UG/ML
INJECTION, SOLUTION INTRAMUSCULAR; INTRAVENOUS
Status: DISPENSED
Start: 2023-08-31

## (undated) RX ORDER — PROPOFOL 10 MG/ML
INJECTION, EMULSION INTRAVENOUS
Status: DISPENSED
Start: 2018-06-28

## (undated) RX ORDER — EPHEDRINE SULFATE 50 MG/ML
INJECTION, SOLUTION INTRAMUSCULAR; INTRAVENOUS; SUBCUTANEOUS
Status: DISPENSED
Start: 2024-05-09

## (undated) RX ORDER — PROPOFOL 10 MG/ML
INJECTION, EMULSION INTRAVENOUS
Status: DISPENSED
Start: 2020-11-11

## (undated) RX ORDER — SODIUM CHLORIDE, SODIUM LACTATE, POTASSIUM CHLORIDE, CALCIUM CHLORIDE 600; 310; 30; 20 MG/100ML; MG/100ML; MG/100ML; MG/100ML
INJECTION, SOLUTION INTRAVENOUS
Status: DISPENSED
Start: 2024-05-09

## (undated) RX ORDER — GABAPENTIN 300 MG/1
CAPSULE ORAL
Status: DISPENSED
Start: 2018-06-28

## (undated) RX ORDER — LIDOCAINE HYDROCHLORIDE 10 MG/ML
INJECTION, SOLUTION INFILTRATION; PERINEURAL
Status: DISPENSED
Start: 2017-06-21

## (undated) RX ORDER — FENTANYL CITRATE 50 UG/ML
INJECTION, SOLUTION INTRAMUSCULAR; INTRAVENOUS
Status: DISPENSED
Start: 2024-04-18

## (undated) RX ORDER — ONDANSETRON 2 MG/ML
INJECTION INTRAMUSCULAR; INTRAVENOUS
Status: DISPENSED
Start: 2018-06-28

## (undated) RX ORDER — CEFAZOLIN SODIUM 1 G/3ML
INJECTION, POWDER, FOR SOLUTION INTRAMUSCULAR; INTRAVENOUS
Status: DISPENSED
Start: 2025-01-02

## (undated) RX ORDER — CEFAZOLIN SODIUM 1 G/3ML
INJECTION, POWDER, FOR SOLUTION INTRAMUSCULAR; INTRAVENOUS
Status: DISPENSED
Start: 2024-04-23

## (undated) RX ORDER — FENTANYL CITRATE 50 UG/ML
INJECTION, SOLUTION INTRAMUSCULAR; INTRAVENOUS
Status: DISPENSED
Start: 2025-01-23

## (undated) RX ORDER — TRANEXAMIC ACID 650 MG/1
TABLET ORAL
Status: DISPENSED
Start: 2024-05-09

## (undated) RX ORDER — CEFAZOLIN SODIUM 1 G/3ML
INJECTION, POWDER, FOR SOLUTION INTRAMUSCULAR; INTRAVENOUS
Status: DISPENSED
Start: 2025-01-03

## (undated) RX ORDER — BUPIVACAINE HYDROCHLORIDE 5 MG/ML
INJECTION, SOLUTION EPIDURAL; INTRACAUDAL
Status: DISPENSED
Start: 2018-06-28

## (undated) RX ORDER — PROPOFOL 10 MG/ML
INJECTION, EMULSION INTRAVENOUS
Status: DISPENSED
Start: 2023-08-31

## (undated) RX ORDER — GLYCOPYRROLATE 0.2 MG/ML
INJECTION, SOLUTION INTRAMUSCULAR; INTRAVENOUS
Status: DISPENSED
Start: 2024-04-23

## (undated) RX ORDER — BUPIVACAINE HYDROCHLORIDE 5 MG/ML
INJECTION, SOLUTION PERINEURAL
Status: DISPENSED
Start: 2024-05-09

## (undated) RX ORDER — FENTANYL CITRATE 50 UG/ML
INJECTION, SOLUTION INTRAMUSCULAR; INTRAVENOUS
Status: DISPENSED
Start: 2018-06-28

## (undated) RX ORDER — LIDOCAINE HYDROCHLORIDE 20 MG/ML
INJECTION, SOLUTION EPIDURAL; INFILTRATION; INTRACAUDAL; PERINEURAL
Status: DISPENSED
Start: 2020-11-11

## (undated) RX ORDER — LIDOCAINE HYDROCHLORIDE 10 MG/ML
INJECTION, SOLUTION EPIDURAL; INFILTRATION; INTRACAUDAL; PERINEURAL
Status: DISPENSED
Start: 2025-07-21

## (undated) RX ORDER — MIDAZOLAM HCL IN 0.9 % NACL/PF 1 MG/ML
PLASTIC BAG, INJECTION (ML) INTRAVENOUS
Status: DISPENSED
Start: 2024-12-31

## (undated) RX ORDER — FENTANYL CITRATE 50 UG/ML
INJECTION, SOLUTION INTRAMUSCULAR; INTRAVENOUS
Status: DISPENSED
Start: 2025-01-02

## (undated) RX ORDER — ONDANSETRON 2 MG/ML
INJECTION INTRAMUSCULAR; INTRAVENOUS
Status: DISPENSED
Start: 2020-11-11

## (undated) RX ORDER — CEFAZOLIN SODIUM/WATER 2 G/20 ML
SYRINGE (ML) INTRAVENOUS
Status: DISPENSED
Start: 2024-05-09

## (undated) RX ORDER — PAPAVERINE HYDROCHLORIDE 30 MG/ML
INJECTION INTRAMUSCULAR; INTRAVENOUS
Status: DISPENSED
Start: 2025-01-02

## (undated) RX ORDER — DEXTROSE MONOHYDRATE 25 G/50ML
INJECTION, SOLUTION INTRAVENOUS
Status: DISPENSED
Start: 2024-04-23

## (undated) RX ORDER — CEFAZOLIN SODIUM 1 G/3ML
INJECTION, POWDER, FOR SOLUTION INTRAMUSCULAR; INTRAVENOUS
Status: DISPENSED
Start: 2018-06-28

## (undated) RX ORDER — CEFAZOLIN SODIUM/WATER 2 G/20 ML
SYRINGE (ML) INTRAVENOUS
Status: DISPENSED
Start: 2024-04-18

## (undated) RX ORDER — HEPARIN SODIUM 1000 [USP'U]/ML
INJECTION, SOLUTION INTRAVENOUS; SUBCUTANEOUS
Status: DISPENSED

## (undated) RX ORDER — ONDANSETRON 2 MG/ML
INJECTION INTRAMUSCULAR; INTRAVENOUS
Status: DISPENSED
Start: 2024-04-23

## (undated) RX ORDER — EPINEPHRINE IN 0.9 % SOD CHLOR 5 MG/250ML
PLASTIC BAG, INJECTION (ML) INTRAVENOUS
Status: DISPENSED
Start: 2024-12-31

## (undated) RX ORDER — FENTANYL CITRATE-0.9 % NACL/PF 10 MCG/ML
PLASTIC BAG, INJECTION (ML) INTRAVENOUS
Status: DISPENSED
Start: 2024-05-09

## (undated) RX ORDER — CEFAZOLIN SODIUM 1 G/3ML
INJECTION, POWDER, FOR SOLUTION INTRAMUSCULAR; INTRAVENOUS
Status: DISPENSED
Start: 2020-11-11

## (undated) RX ORDER — GABAPENTIN 100 MG/1
CAPSULE ORAL
Status: DISPENSED
Start: 2024-05-09

## (undated) RX ORDER — LIDOCAINE HYDROCHLORIDE 10 MG/ML
INJECTION, SOLUTION EPIDURAL; INFILTRATION; INTRACAUDAL; PERINEURAL
Status: DISPENSED
Start: 2025-01-23

## (undated) RX ORDER — LIDOCAINE HYDROCHLORIDE 20 MG/ML
INJECTION, SOLUTION EPIDURAL; INFILTRATION; INTRACAUDAL; PERINEURAL
Status: DISPENSED
Start: 2018-06-28

## (undated) RX ORDER — LIDOCAINE HYDROCHLORIDE 10 MG/ML
INJECTION, SOLUTION EPIDURAL; INFILTRATION; INTRACAUDAL; PERINEURAL
Status: DISPENSED
Start: 2024-04-23

## (undated) RX ORDER — DEXAMETHASONE SODIUM PHOSPHATE 4 MG/ML
INJECTION, SOLUTION INTRA-ARTICULAR; INTRALESIONAL; INTRAMUSCULAR; INTRAVENOUS; SOFT TISSUE
Status: DISPENSED
Start: 2018-06-28

## (undated) RX ORDER — GABAPENTIN 300 MG/1
CAPSULE ORAL
Status: DISPENSED
Start: 2020-11-11

## (undated) RX ORDER — LIDOCAINE HYDROCHLORIDE 10 MG/ML
INJECTION, SOLUTION EPIDURAL; INFILTRATION; INTRACAUDAL; PERINEURAL
Status: DISPENSED
Start: 2019-04-29

## (undated) RX ORDER — MIDAZOLAM HYDROCHLORIDE 5 MG/ML
INJECTION, SOLUTION INTRAMUSCULAR; INTRAVENOUS
Status: DISPENSED

## (undated) RX ORDER — HYDRALAZINE HYDROCHLORIDE 20 MG/ML
INJECTION INTRAMUSCULAR; INTRAVENOUS
Status: DISPENSED
Start: 2018-06-28

## (undated) RX ORDER — KETAMINE HYDROCHLORIDE 10 MG/ML
INJECTION INTRAMUSCULAR; INTRAVENOUS
Status: DISPENSED
Start: 2018-06-28

## (undated) RX ORDER — FENTANYL CITRATE 50 UG/ML
INJECTION, SOLUTION INTRAMUSCULAR; INTRAVENOUS
Status: DISPENSED
Start: 2024-05-09

## (undated) RX ORDER — CEFAZOLIN SODIUM 2 G/100ML
INJECTION, SOLUTION INTRAVENOUS
Status: DISPENSED
Start: 2025-01-23

## (undated) RX ORDER — GLYCOPYRROLATE 0.2 MG/ML
INJECTION, SOLUTION INTRAMUSCULAR; INTRAVENOUS
Status: DISPENSED
Start: 2024-05-09

## (undated) RX ORDER — ALBUMIN (HUMAN) 12.5 G/50ML
SOLUTION INTRAVENOUS
Status: DISPENSED

## (undated) RX ORDER — VANCOMYCIN HYDROCHLORIDE 500 MG/10ML
INJECTION, POWDER, LYOPHILIZED, FOR SOLUTION INTRAVENOUS
Status: DISPENSED
Start: 2018-06-28

## (undated) RX ORDER — EPINEPHRINE 1 MG/ML
INJECTION, SOLUTION, CONCENTRATE INTRAVENOUS
Status: DISPENSED
Start: 2018-06-28

## (undated) RX ORDER — CALCIUM CHLORIDE 100 MG/ML
INJECTION INTRAVENOUS; INTRAVENTRICULAR
Status: DISPENSED
Start: 2025-01-02

## (undated) RX ORDER — FENTANYL CITRATE-0.9 % NACL/PF 10 MCG/ML
PLASTIC BAG, INJECTION (ML) INTRAVENOUS
Status: DISPENSED

## (undated) RX ORDER — LIDOCAINE HYDROCHLORIDE 10 MG/ML
INJECTION, SOLUTION EPIDURAL; INFILTRATION; INTRACAUDAL; PERINEURAL
Status: DISPENSED
Start: 2018-06-28

## (undated) RX ORDER — OXYCODONE HYDROCHLORIDE 10 MG/1
TABLET ORAL
Status: DISPENSED
Start: 2024-05-09

## (undated) RX ORDER — KETOROLAC TROMETHAMINE 30 MG/ML
INJECTION, SOLUTION INTRAMUSCULAR; INTRAVENOUS
Status: DISPENSED
Start: 2018-06-28

## (undated) RX ORDER — HEPARIN SODIUM 1000 [USP'U]/ML
INJECTION, SOLUTION INTRAVENOUS; SUBCUTANEOUS
Status: DISPENSED
Start: 2024-04-23

## (undated) RX ORDER — DEXAMETHASONE SODIUM PHOSPHATE 4 MG/ML
INJECTION, SOLUTION INTRA-ARTICULAR; INTRALESIONAL; INTRAMUSCULAR; INTRAVENOUS; SOFT TISSUE
Status: DISPENSED
Start: 2020-11-11

## (undated) RX ORDER — EPHEDRINE SULFATE 50 MG/ML
INJECTION, SOLUTION INTRAMUSCULAR; INTRAVENOUS; SUBCUTANEOUS
Status: DISPENSED
Start: 2023-08-31

## (undated) RX ORDER — FENTANYL CITRATE 50 UG/ML
INJECTION, SOLUTION INTRAMUSCULAR; INTRAVENOUS
Status: DISPENSED
Start: 2020-11-11

## (undated) RX ORDER — LIDOCAINE HYDROCHLORIDE 10 MG/ML
INJECTION, SOLUTION INFILTRATION; PERINEURAL
Status: DISPENSED
Start: 2017-07-17

## (undated) RX ORDER — DEXAMETHASONE SODIUM PHOSPHATE 4 MG/ML
INJECTION, SOLUTION INTRA-ARTICULAR; INTRALESIONAL; INTRAMUSCULAR; INTRAVENOUS; SOFT TISSUE
Status: DISPENSED
Start: 2024-05-09

## (undated) RX ORDER — ESMOLOL HYDROCHLORIDE 10 MG/ML
INJECTION INTRAVENOUS
Status: DISPENSED
Start: 2025-01-02

## (undated) RX ORDER — ETOMIDATE 2 MG/ML
INJECTION INTRAVENOUS
Status: DISPENSED

## (undated) RX ORDER — SODIUM CHLORIDE, SODIUM GLUCONATE, SODIUM ACETATE, POTASSIUM CHLORIDE AND MAGNESIUM CHLORIDE 526; 502; 368; 37; 30 MG/100ML; MG/100ML; MG/100ML; MG/100ML; MG/100ML
INJECTION, SOLUTION INTRAVENOUS
Status: DISPENSED

## (undated) RX ORDER — HEPARIN SODIUM 1000 [USP'U]/ML
INJECTION, SOLUTION INTRAVENOUS; SUBCUTANEOUS
Status: DISPENSED
Start: 2025-01-02

## (undated) RX ORDER — PROPOFOL 10 MG/ML
INJECTION, EMULSION INTRAVENOUS
Status: DISPENSED
Start: 2024-05-09

## (undated) RX ORDER — LABETALOL HYDROCHLORIDE 5 MG/ML
INJECTION, SOLUTION INTRAVENOUS
Status: DISPENSED
Start: 2018-06-28

## (undated) RX ORDER — PROTAMINE SULFATE 10 MG/ML
INJECTION, SOLUTION INTRAVENOUS
Status: DISPENSED

## (undated) RX ORDER — ONDANSETRON 2 MG/ML
INJECTION INTRAMUSCULAR; INTRAVENOUS
Status: DISPENSED
Start: 2024-05-09

## (undated) RX ORDER — HEPARIN SODIUM 1000 [USP'U]/ML
INJECTION, SOLUTION INTRAVENOUS; SUBCUTANEOUS
Status: DISPENSED
Start: 2024-12-31

## (undated) RX ORDER — LIDOCAINE HYDROCHLORIDE 20 MG/ML
INJECTION, SOLUTION INFILTRATION; PERINEURAL
Status: DISPENSED
Start: 2018-01-25

## (undated) RX ORDER — CALCIUM CHLORIDE 100 MG/ML
INJECTION INTRAVENOUS; INTRAVENTRICULAR
Status: DISPENSED

## (undated) RX ORDER — GLYCOPYRROLATE 0.2 MG/ML
INJECTION INTRAMUSCULAR; INTRAVENOUS
Status: DISPENSED
Start: 2018-06-28

## (undated) RX ORDER — KETOROLAC TROMETHAMINE 30 MG/ML
INJECTION, SOLUTION INTRAMUSCULAR; INTRAVENOUS
Status: DISPENSED
Start: 2020-11-11